# Patient Record
Sex: MALE | Race: ASIAN | NOT HISPANIC OR LATINO
[De-identification: names, ages, dates, MRNs, and addresses within clinical notes are randomized per-mention and may not be internally consistent; named-entity substitution may affect disease eponyms.]

---

## 2022-11-10 PROBLEM — Z00.00 ENCOUNTER FOR PREVENTIVE HEALTH EXAMINATION: Status: ACTIVE | Noted: 2022-11-10

## 2022-11-21 ENCOUNTER — NON-APPOINTMENT (OUTPATIENT)
Age: 52
End: 2022-11-21

## 2022-11-21 ENCOUNTER — APPOINTMENT (OUTPATIENT)
Dept: CARDIOTHORACIC SURGERY | Facility: CLINIC | Age: 52
End: 2022-11-21

## 2022-11-21 VITALS
DIASTOLIC BLOOD PRESSURE: 72 MMHG | HEART RATE: 59 BPM | RESPIRATION RATE: 18 BRPM | BODY MASS INDEX: 23.57 KG/M2 | HEIGHT: 72 IN | WEIGHT: 174 LBS | OXYGEN SATURATION: 96 % | TEMPERATURE: 97.6 F | SYSTOLIC BLOOD PRESSURE: 121 MMHG

## 2022-11-21 DIAGNOSIS — Z78.9 OTHER SPECIFIED HEALTH STATUS: ICD-10-CM

## 2022-11-23 ENCOUNTER — APPOINTMENT (OUTPATIENT)
Dept: CARDIOTHORACIC SURGERY | Facility: CLINIC | Age: 52
End: 2022-11-23

## 2022-11-28 NOTE — ASSESSMENT
[FreeTextEntry1] : 52 year old male, current every day marijuana use with a past medical hx of HTN, type A aortic dissection s/p Dacron grafts, AV resuspension in 2013,CAD s/p CABG x 1 (SVG to RCA 5/2013 with Dr. Medina,  seizure disorder (last episode on 7/4/22) presents for evaluation and management of his aortic pathology. \par Patient is referred by Dr. Jacqueline Gonsales. \par \par I have reviewed the patient's medical records, diagnostic images during the time of this office consultation and have made the following recommendation. I have reviewed the indications for surgery, and used our webpage [email or web address] <[email or web address]> to illustrate the aorta and anatomy of the heart. I have discussed the indications for surgery with the patient. Those indications are the following: size greater than 5.0 cm, symptomatic aneurysms, family history of aortic dissection or aneurysm death with a size greater than 4.5 cm, other necessary cardiac procedures such as coronary artery bypass grafting or valvular disorders with an aneurysm greater than 4.5 cm, or connective tissue disorders with an aneurysm size greater than 4.5 cm. The patient meets the indications.\par \par I had a lengthy discussion with the patient regarding his aneurysmal and valvular disease and progression. I have recommended that the patient is a candidate for a REOP ARCH REPLACEMENT. \par \par I also discussed undergoing a CTA chest, abdomen and pelvis to determine if he would be a candidate for the TRIOMPHE aortic arch stent graft. If eligible, patient will require a carotid to carotid to subclavian bypass, TEVAR. \par \par All questions were addressed and patient expressed interest in signing the consent form to reformat the CT images. \par \par Plan: \par 1.Obtain GATED CTA, Chest, abdomen and pelvis \par 2.Reformat images; discuss with sponsor to determine if the patient is a candidate for the TRIOMPHE aortic stent graft \par 3. Will contact patient in 1-2 weeks to discuss surgical planning \par 4. BP management \par 5. Follow up with cardiologist. \par

## 2022-11-28 NOTE — PHYSICAL EXAM
[General Appearance - Alert] : alert [General Appearance - In No Acute Distress] : in no acute distress [General Appearance - Well Nourished] : well nourished [General Appearance - Well Developed] : well developed [Sclera] : the sclera and conjunctiva were normal [PERRL With Normal Accommodation] : pupils were equal in size, round, and reactive to light [Extraocular Movements] : extraocular movements were intact [Outer Ear] : the ears and nose were normal in appearance [Hearing Threshold Finger Rub Not [place]] : hearing was normal [Both Tympanic Membranes Were Examined] : both tympanic membranes were normal [Neck Appearance] : the appearance of the neck was normal [Neck Cervical Mass (___cm)] : no neck mass was observed [Respiration, Rhythm And Depth] : normal respiratory rhythm and effort [Exaggerated Use Of Accessory Muscles For Inspiration] : no accessory muscle use [Auscultation Breath Sounds / Voice Sounds] : lungs were clear to auscultation bilaterally [Apical Impulse] : the apical impulse was normal [Heart Rate And Rhythm] : heart rate was normal and rhythm regular [Heart Sounds] : normal S1 and S2 [Examination Of The Chest] : the chest was normal in appearance [2+] : left 2+ [No Abnormalities] : the abdominal aorta was not enlarged and no bruit was heard [Bowel Sounds] : normal bowel sounds [Abdomen Soft] : soft [Abdomen Tenderness] : non-tender [No CVA Tenderness] : no ~M costovertebral angle tenderness [Abnormal Walk] : normal gait [Nail Clubbing] : no clubbing  or cyanosis of the fingernails [Musculoskeletal - Swelling] : no joint swelling seen [Skin Color & Pigmentation] : normal skin color and pigmentation [Skin Turgor] : normal skin turgor [] : no rash [Cranial Nerves] : cranial nerves 2-12 were intact [Deep Tendon Reflexes (DTR)] : deep tendon reflexes were 2+ and symmetric [Sensation] : the sensory exam was normal to light touch and pinprick [Motor Exam] : the motor exam was normal [Oriented To Time, Place, And Person] : oriented to person, place, and time [Impaired Insight] : insight and judgment were intact [Affect] : the affect was normal [Mood] : the mood was normal [FreeTextEntry1] : healed MSI  [Varicose Veins Of The Right Leg] : the patient has no varicose veins of the right leg [Varicose Veins Of The Left Leg] : the patient has no varicose veins of the left leg

## 2022-11-28 NOTE — PROCEDURE
[FreeTextEntry1] : Dr. Pierre reviewed the indications for surgery, and used our webpage [email or web address] <[email or web address]> to illustrate the aorta and anatomy of the heart. Those indications are the following: size greater than 5.0 cm, symptomatic aneurysms, family history of aortic dissection or aneurysm death with a size greater than 4.5 cm, other necessary cardiac procedures such as coronary artery bypass grafting or valvular disorders with an aneurysm greater than 4.5 cm, or connective tissue disorders with an aneurysm size greater than 4.5 cm. \par Dr. Pierre discussed activity restrictions with the patient, and would advise exercise at a moderate amount with no heavy lifting over one third of body weight, and avoiding heart rates that exceed 140 beats per minute. In addition, every patient should abstain from tobacco abuse and to avoid all illicit drug use, especially stimulants such as cocaine or methamphetamine. Dr. Pierre also counseled regarding maintaining a healthy heart diet, and losing any excessive weight as this also put undue stress on both the aorta and entire cardiovascular system. First degree family members should be screened for bicuspid valve disease, and ascending aortic aneurysms. \par Patient was advised to view the educational video prior to this visit regarding aortic pathology, risk factors, surgical procedures, and lifestyle modifications. Video can be retrieved at <[email or web address]=[email or web address]>.\par

## 2022-11-28 NOTE — DATA REVIEWED
[FreeTextEntry1] : MRI brain 3/22/2022: No acute intracranial abnormality. Focal susceptibility artifact seen in the left superior frontal gyrus and left postcentral gyrus may represent focal  dystrophic calcification, hemosiderin deposition from prior microhemorrhage or cavernomas. \par \par TTE 8/19/2022\par EF normal. trileaflet aortic valve. no aortic stenosis. mild to moderate aortic regurgitation. descending aorta measures 5.07cm. \par \par MRA 9/19/22\par ascending aorta 3cm. aortic arch 5.1cm. descending aorta 5.7cm. aortic dissection extends from upper aspect of the ascending aorta/proximal aortic arch to the aortic bifurcation. intimal flaps are present in the brachiocephalic artery, right subclavian artery, right common carotid artery. \par

## 2022-11-28 NOTE — END OF VISIT
[Time Spent: ___ minutes] : I have spent [unfilled] minutes of time on the encounter. [FreeTextEntry3] : I, KAYCE PARIKH , am scribing for and in the presence of YOSHI HOLT the following sections: History of present illness, past Medical/family/surgical/family/social history, review of systems, vital signs, physical exam and disposition.\par  \par I personally performed the services described in the documentation, reviewed the documentation recorded by the scribe in my presence and it accurately and completely records my words and actions.\par

## 2022-11-28 NOTE — HISTORY OF PRESENT ILLNESS
[FreeTextEntry1] : 52 year old male, current every day marijuana use with a past medical hx of HTN, type A aortic dissection s/p Dacron grafts, AV resuspension in 2013,CAD s/p CABG x 1 SVG to RCA (5/2013 with Dr. Medina), seizure disorder (last episode on 7/4/22) presents for evaluation and management of his aortic pathology. \par Patient is referred by Dr. Jacqueline Gonsales. \par \par He reports intermittent lower back pain relieved with oxycodone. He denies fever, chills, fatigue, headache, blurred vision, dizziness, syncope, chest pain, palpitations, shortness of breath, orthopnea, paroxysmal nocturnal dyspnea, nausea, vomiting, abdominal pain, BRBPR or swelling to legs. He is active and exercises several times a week. \par  \par \par

## 2022-11-28 NOTE — CONSULT LETTER
[Dear  ___] : Dear  [unfilled], [Please see my note below.] : Please see my note below. [Sincerely,] : Sincerely, [FreeTextEntry2] : Jacqueline Gonsales MD\par [facility]\par [address]\[place] [address]\par tel. [phone]; [phone]\par fax. [phone]; [phone] [FreeTextEntry1] : Please find attached our consultation on your patient, VINCENT MARIE \par We take a multidisciplinary team approach to patient care and consider you, the referring physician, an extension of our team. We will maintain an open line of communication with you throughout your patient's treatment course. \par It is our commitment to provide your patient with the highest quality of advanced therapeutic options. We thank you for allowing us to participate in the care of your patient.\par Please do not hesitate to contact our team with any questions or concerns at [phone].\par   [FreeTextEntry3] : Elvis Pierre M.D.\par Professor of Cardiovascular and Thoracic Surgery\par Minimally Invasive Valve Surgeon\par Director of Aortic Surgery, [facility]\par Cell: [phone]\par Email: [email or web address] \par \par [facility]:\par [address], [address] Floor, [place] [address]\par Office: [phone]\par Fax: [phone]\par \par [facility]:\par Department of Cardiovascular and Thoracic Surgery\par [address] [place] [address], [address]\par Office: [phone]\par Fax: [phone]\par \par \par Practice Manager: Ms. Sloane Fischer\par Email: [email or web address] \par Phone: [phone]

## 2022-11-30 ENCOUNTER — APPOINTMENT (OUTPATIENT)
Dept: CT IMAGING | Facility: CLINIC | Age: 52
End: 2022-11-30
Payer: SELF-PAY

## 2022-11-30 ENCOUNTER — RESULT REVIEW (OUTPATIENT)
Age: 52
End: 2022-11-30

## 2022-11-30 ENCOUNTER — OUTPATIENT (OUTPATIENT)
Dept: OUTPATIENT SERVICES | Facility: HOSPITAL | Age: 52
LOS: 1 days | End: 2022-11-30

## 2022-11-30 PROCEDURE — 74174 CTA ABD&PLVS W/CONTRAST: CPT | Mod: 26

## 2022-11-30 PROCEDURE — 71275 CT ANGIOGRAPHY CHEST: CPT | Mod: 26

## 2023-01-03 ENCOUNTER — NON-APPOINTMENT (OUTPATIENT)
Age: 53
End: 2023-01-03

## 2023-01-04 ENCOUNTER — APPOINTMENT (OUTPATIENT)
Dept: CARDIOTHORACIC SURGERY | Facility: CLINIC | Age: 53
End: 2023-01-04

## 2023-02-01 ENCOUNTER — NON-APPOINTMENT (OUTPATIENT)
Age: 53
End: 2023-02-01

## 2023-02-14 ENCOUNTER — NON-APPOINTMENT (OUTPATIENT)
Age: 53
End: 2023-02-14

## 2023-02-15 ENCOUNTER — NON-APPOINTMENT (OUTPATIENT)
Age: 53
End: 2023-02-15

## 2023-02-15 ENCOUNTER — APPOINTMENT (OUTPATIENT)
Dept: CARDIOTHORACIC SURGERY | Facility: CLINIC | Age: 53
End: 2023-02-15

## 2023-03-01 ENCOUNTER — APPOINTMENT (OUTPATIENT)
Dept: CARDIOTHORACIC SURGERY | Facility: CLINIC | Age: 53
End: 2023-03-01

## 2023-03-01 VITALS
DIASTOLIC BLOOD PRESSURE: 72 MMHG | TEMPERATURE: 98 F | SYSTOLIC BLOOD PRESSURE: 120 MMHG | OXYGEN SATURATION: 98 % | HEART RATE: 50 BPM | WEIGHT: 173 LBS | BODY MASS INDEX: 23.43 KG/M2 | RESPIRATION RATE: 18 BRPM | HEIGHT: 72 IN

## 2023-03-01 DIAGNOSIS — Z01.818 ENCOUNTER FOR OTHER PREPROCEDURAL EXAMINATION: ICD-10-CM

## 2023-03-01 RX ADMIN — FENTANYL CITRATE 25 MICROGRAM(S): 50 INJECTION INTRAVENOUS at 22:50

## 2023-03-06 RX ORDER — LEVETIRACETAM 500 MG/1
500 TABLET, FILM COATED ORAL
Qty: 60 | Refills: 0 | Status: DISCONTINUED | COMMUNITY
Start: 2022-09-28 | End: 2023-03-06

## 2023-03-06 RX ORDER — DIVALPROEX SODIUM 250 MG/1
250 TABLET, DELAYED RELEASE ORAL
Refills: 0 | Status: DISCONTINUED | COMMUNITY
Start: 2023-03-06 | End: 2023-03-06

## 2023-03-06 NOTE — ASSESSMENT
[FreeTextEntry1] : 52 year old male, current every day marijuana use with a past medical hx of HTN, type A aortic dissection s/p Dacron grafts, AV resuspension in 2013,CAD s/p CABG x 1 SVG to RCA (5/2013 with Dr. Medina), seizure disorder (last episode on 7/4/22) presents for a follow up visit for evaluation and management of his aortic pathology. Patient is referred by Dr. Jacqueline Gonsales. \par \par I have reviewed the patient's medical records, diagnostic images during the time of this office consultation and have made the following recommendation. I have reviewed the indications for surgery, and used our webpage [email or web address] <[email or web address]> to illustrate the aorta and anatomy of the heart. I have discussed the indications for surgery with the patient. Those indications are the following: size greater than 5.0 cm, symptomatic aneurysms, family history of aortic dissection or aneurysm death with a size greater than 4.5 cm, other necessary cardiac procedures such as coronary artery bypass grafting or valvular disorders with an aneurysm greater than 4.5 cm, or connective tissue disorders with an aneurysm size greater than 4.5 cm. The patient meets the indications.\par \par I had a lengthy discussion with the patient regarding his aneurysmal disease and progression. I have recommended that the patient is a candidate for a reop sternotomy, total arch replacement.  I have discussed the risks, benefits and alternatives to surgery. I have explained the risks of the surgery, including approximately 10 % major mortality or morbidity including stroke, infection, bleeding, death, renal failure and heart attack. There is a 5% risk of vocal cord injury. All questions were addressed and patient agrees to proceed with surgery.\par \par - the patient is a candidate for a reop total arch replacement with Thoraflex. DOS pending.\par - pt recommended for genetic testing. pt would like to defer at this time until after surgery.\par - BP management.\par - left heart cath? CTA coronaries? will review with Dr. Pierre.\par - covid test, carotid doppler, chest xray, preop labs, EKG.\par - continue cardiology care with Dr. Gonsales. \par

## 2023-03-06 NOTE — CONSULT LETTER
[Dear  ___] : Dear  [unfilled], [Please see my note below.] : Please see my note below. [FreeTextEntry2] : Jacqueline Gonsales MD [FreeTextEntry1] : Please find attached our consultation on your patient, Mr. VINCENT MARIE . \par \par We take a multidisciplinary team approach to patient care and consider you, the referring physician, an extension of our team. We will maintain an open line of communication with you throughout your patient's treatment course.  \par \par It is our commitment to provide your patient with the highest quality of advanced therapeutic options. We thank you for allowing us to participate in the care of your patient.\par \par Please do not hesitate to contact our team with any questions or concerns at [phone]. \par \par  [FreeTextEntry3] : Sincerely, \par \par \par \par \par \par Elvis Pierre M.D.\par Professor of Cardiovascular and Thoracic Surgery\par Minimally Invasive Valve Surgeon\par Director of Aortic Surgery, [facility]\par Cell: [phone]\par Email: [email or web address] \par \par [facility]:\par [address], [address], [place] [address]\par Office: [phone]\par Fax: [phone]\par \par [facility]:\par Department of Cardiovascular and Thoracic Surgery\par [address] [place] [address], [address]\par Office: [phone]\par Fax: [phone]\par \par Practice Manager: Ms. Sloane Fischer\par Email: [email or web address]\par Phone: [phone]\par \par \par \par

## 2023-03-06 NOTE — END OF VISIT
[Time Spent: ___ minutes] : I have spent [unfilled] minutes of time on the encounter. [FreeTextEntry3] : \par I, BEA GERMANU , am scribing for and in the presence of YOSHI HOLT the following sections: History of present illness, past Medical/family/surgical/family/social history, review of systems, vital signs, physical exam and disposition.\par I personally performed the services described in the documentation, reviewed the documentation recorded by the scribe in my presence and it accurately and completely records my words and actions.\par \par \par

## 2023-03-06 NOTE — HISTORY OF PRESENT ILLNESS
Patient [FreeTextEntry1] : 52 year old male, current every day marijuana use with a past medical hx of HTN, type A aortic dissection s/p Dacron grafts, AV resuspension in 2013,CAD s/p CABG x 1 SVG to RCA (5/2013 with Dr. Medina), seizure disorder (last episode on 7/4/22) presents for a follow up visit for evaluation and management of his aortic pathology. Patient is referred by Dr. Jacqueline Gonsales. \par \par Screen failed for Triomphe study \par \par CTA chest, abdomen and pelvis 11/30/22\par Status post graft repair of the ascending aorta and portion of aortic arch.\par Dissecting aneurysm of the descending thoracic aorta (measuring up to 5.7 cm) and abdominal aorta (3.5 cm infrarenal), similar to the MR angiogram of 9/19/2022.\par Nonocclusive dissection flap present within the innominate artery, aneurysmal right subclavian artery and proximal right common carotid artery as well as aneurysmal left common iliac artery.\par \par Patient denies fever, chills, fatigue, headache, blurred vision, dizziness, syncope, chest pain, palpitations, shortness of breath, orthopnea, paroxysmal nocturnal dyspnea, nausea, vomiting, abdominal pain, back pain, BRBPR or swelling to legs.\par

## 2023-03-06 NOTE — PHYSICAL EXAM
[Sclera] : the sclera and conjunctiva were normal [Neck Appearance] : the appearance of the neck was normal [] : no respiratory distress [Respiration, Rhythm And Depth] : normal respiratory rhythm and effort [Heart Rate And Rhythm] : heart rate was normal and rhythm regular [Heart Sounds] : normal S1 and S2 [Murmurs] : no murmurs [Examination Of The Chest] : the chest was normal in appearance [2+] : left 2+ [Bowel Sounds] : normal bowel sounds [Abdomen Soft] : soft [No CVA Tenderness] : no ~M costovertebral angle tenderness [Abnormal Walk] : normal gait [Skin Color & Pigmentation] : normal skin color and pigmentation [No Focal Deficits] : no focal deficits [Oriented To Time, Place, And Person] : oriented to person, place, and time [FreeTextEntry1] : deferred

## 2023-03-06 NOTE — PROCEDURE
[FreeTextEntry1] : Patient was advised to view the educational video prior to this visit regarding aortic pathology, risk factors, surgical procedures, and lifestyle modifications. Video can be retrieved at [email or web address]&feature=youtu.be.\par \par

## 2023-03-07 VITALS
SYSTOLIC BLOOD PRESSURE: 138 MMHG | WEIGHT: 169.98 LBS | OXYGEN SATURATION: 96 % | TEMPERATURE: 98 F | HEART RATE: 48 BPM | DIASTOLIC BLOOD PRESSURE: 63 MMHG | RESPIRATION RATE: 15 BRPM

## 2023-03-07 RX ORDER — CHLORHEXIDINE GLUCONATE 213 G/1000ML
1 SOLUTION TOPICAL ONCE
Refills: 0 | Status: DISCONTINUED | OUTPATIENT
Start: 2023-03-09 | End: 2023-03-23

## 2023-03-07 NOTE — H&P ADULT - NSHPLABSRESULTS_GEN_ALL_CORE
11.1   4.89  )-----------( 162      ( 09 Mar 2023 09:33 )             35.4       03-09    137  |  101  |  23  ----------------------------<  78  4.7   |  28  |  1.28    Ca    8.9      09 Mar 2023 10:04  Mg     2.4     03-09    TPro  6.7  /  Alb  3.6  /  TBili  0.2  /  DBili  x   /  AST  19  /  ALT  11  /  AlkPhos  57  03-09      PT/INR - ( 09 Mar 2023 09:33 )   PT: 11.6 sec;   INR: 0.98          PTT - ( 09 Mar 2023 09:33 )  PTT:36.7 sec    CARDIAC MARKERS ( 09 Mar 2023 10:04 )  x     / x     / 76 U/L / x     / <1.0 ng/mL

## 2023-03-07 NOTE — H&P ADULT - ASSESSMENT
52 year old male, current every day marijuana use, with HTN, type A aortic dissection s/p Dacron grafts, AV resuspension in 2013, known CAD s/p CABGx1 (SVG-RCA 05/2013 w/Dr. Medina), seizure disorder (last episode 07/04/22) who presented to his outpatient cardiologist for evaluation of aortic pathology. TTE 08/19/2022 revealing normal LVSF with EF 66%, no RWMA, AV is trileaflet, no AS, mild/mod AR. The aortic root is normal in size, descending aorta dilated at 5.07 cm. IVC is dilated w/ <50% collapse c/s elevated RAP. CT chest anigogram 11/30/2022: s/p graft repair of ascending aorta and portion of aortic arch. Dissecting aneurysm of descending thoracic aorta (5.7 cm) and abdominal aorta (3.5 cm infrarenal) and nonocclusive dissection flap present within the innominate artery, aneurysmal right subclavian artery and prox right common carotid artery as well as aneurysmal left AN. MRA Chest 09/19/2022: aneurysmal dilation of aortic arch and descending aorta, aortic dissection extending from the upper ascending aorta, proximal aortic arch to the aortic bifurcation. Given patients aneurysmal disease and progression he is now referred to [facility] for left coronary angiogram to further assess his coronary anatomy in preparation for reop sternotomy and total arch replacement.     EKG: sinus bailey 50bpm			  ASA I			Mallampati class: III    Anginal Class: I    -No Known Allergies    -H/H = 11.1/35.4  . Pt denies BRBPR, hematuria, hematochezia, melena. No load as patient is pre-procedure for sternotomy and arch replacement  -BUN/Cr = 23/1.28  . EF 66%. Euvolemic on exam. IV NS @ 250cc bolus followed by 75cc/2hrs started pre procedure    Sedation Plan:   Moderate  Patient Is Suitable Candidate For Sedation?     Yes    Risks & benefits of procedure and alternative therapy have been explained to the patient including but not limited to: allergic reaction, bleeding with possible need for blood transfusion, infection, renal and vascular compromise, limb damage, arrhythmia, stroke, vessel dissection/perforation, myocardial infarction, and emergent CABG. Informed consent obtained at bedside and included in chart.

## 2023-03-07 NOTE — H&P ADULT - NSICDXPASTMEDICALHX_GEN_ALL_CORE_FT
PAST MEDICAL HISTORY:  Aortic dissection     CAD (coronary artery disease)     HTN (hypertension)     Seizure disorder

## 2023-03-07 NOTE — H&P ADULT - HISTORY OF PRESENT ILLNESS
COVID:  Cardiologist: Dr. Randee García  Pharmacy:  Escort:      52 year old male, current every day marijuana use, with HTN, type A aortic dissection s/p Dacron grafts, AV resuspension in 2013, known CAD s/p CABGx1 (SVG-RCA 05/2013 w/Dr. Medina), seizure disorder (last episode 07/04/22) who presented to his outpatient cardiologist for evaluation of aortic pathology. Patient underwent TTE 08/19/2022 revealing normal LVSF with EF 66%, no RWMA, AV is trileaflet, no AS, mild/mod AR. The aortic root is normal in size, descending aorta dilated at 5.07 cm. IVC is dilated w/ <50% collapse c/s elevated RAP. CT chest anigogram 11/30/2022: s/p graft repair of ascending aorta and portion of aortic arch. Dissecting aneurysm of descending thoracic aorta (5.7 cm) and abdominal aorta (3.5 cm infrarenal) and nonocclusive dissection flap present within the innominate artery, aneurysmal right subclavian artery and prox right common carotid artery as well as aneurysmal left AN. MRA Chest 09/19/2022: aneurysmal dilation of aortic arch and descending aorta, aortic dissection extending from the upper ascending aorta, proximal aortic arch to the aortic bifurcation. Given patients aneurysmal disease and progression he is now referred to [facility] for left coronary angiogram to further assess his coronary anatomy in preparation for reop sternotomy and total arch replacement.  COVID: (-) 3/9/23 in [facility]  Cardiologist: Dr. Randee García  Pharmacy: [facility] [id number] [facility]  Escort: Wife    52 year old male, current every day marijuana use, with HTN, type A aortic dissection s/p Dacron grafts, AV resuspension in 2013, known CAD s/p CABGx1 (SVG-RCA 05/2013 w/Dr. Medina), seizure disorder (last episode 07/04/22) who presented to his outpatient cardiologist for evaluation of aortic pathology. Patient underwent TTE 08/19/2022 revealing normal LVSF with EF 66%, no RWMA, AV is trileaflet, no AS, mild/mod AR. The aortic root is normal in size, descending aorta dilated at 5.07 cm. IVC is dilated w/ <50% collapse c/s elevated RAP. CT chest anigogram 11/30/2022: s/p graft repair of ascending aorta and portion of aortic arch. Dissecting aneurysm of descending thoracic aorta (5.7 cm) and abdominal aorta (3.5 cm infrarenal) and nonocclusive dissection flap present within the innominate artery, aneurysmal right subclavian artery and prox right common carotid artery as well as aneurysmal left AN. MRA Chest 09/19/2022: aneurysmal dilation of aortic arch and descending aorta, aortic dissection extending from the upper ascending aorta, proximal aortic arch to the aortic bifurcation. Given patients aneurysmal disease and progression he is now referred to [facility] for left coronary angiogram to further assess his coronary anatomy in preparation for reop sternotomy and total arch replacement.

## 2023-03-08 PROBLEM — Z01.818 PREOP TESTING: Status: ACTIVE | Noted: 2023-03-08

## 2023-03-09 ENCOUNTER — RESULT REVIEW (OUTPATIENT)
Age: 53
End: 2023-03-09

## 2023-03-09 ENCOUNTER — OUTPATIENT (OUTPATIENT)
Dept: OUTPATIENT SERVICES | Facility: HOSPITAL | Age: 53
LOS: 1 days | Discharge: HOME | End: 2023-03-09
Payer: COMMERCIAL

## 2023-03-09 DIAGNOSIS — Z95.1 PRESENCE OF AORTOCORONARY BYPASS GRAFT: Chronic | ICD-10-CM

## 2023-03-09 DIAGNOSIS — Z95.828 PRESENCE OF OTHER VASCULAR IMPLANTS AND GRAFTS: Chronic | ICD-10-CM

## 2023-03-09 LAB
A1C WITH ESTIMATED AVERAGE GLUCOSE RESULT: 4.4 % — SIGNIFICANT CHANGE UP (ref 4–5.6)
ALBUMIN SERPL ELPH-MCNC: 3.6 G/DL — SIGNIFICANT CHANGE UP (ref 3.3–5)
ALP SERPL-CCNC: 57 U/L — SIGNIFICANT CHANGE UP (ref 40–120)
ALT FLD-CCNC: 11 U/L — SIGNIFICANT CHANGE UP (ref 10–45)
ANION GAP SERPL CALC-SCNC: 8 MMOL/L — SIGNIFICANT CHANGE UP (ref 5–17)
APPEARANCE UR: CLEAR — SIGNIFICANT CHANGE UP
APTT BLD: 36.7 SEC — HIGH (ref 27.5–35.5)
AST SERPL-CCNC: 19 U/L — SIGNIFICANT CHANGE UP (ref 10–40)
BASOPHILS # BLD AUTO: 0.03 K/UL — SIGNIFICANT CHANGE UP (ref 0–0.2)
BASOPHILS NFR BLD AUTO: 0.6 % — SIGNIFICANT CHANGE UP (ref 0–2)
BILIRUB SERPL-MCNC: 0.2 MG/DL — SIGNIFICANT CHANGE UP (ref 0.2–1.2)
BILIRUB UR-MCNC: NEGATIVE — SIGNIFICANT CHANGE UP
BLD GP AB SCN SERPL QL: NEGATIVE — SIGNIFICANT CHANGE UP
BUN SERPL-MCNC: 23 MG/DL — SIGNIFICANT CHANGE UP (ref 7–23)
CALCIUM SERPL-MCNC: 8.9 MG/DL — SIGNIFICANT CHANGE UP (ref 8.4–10.5)
CHLORIDE SERPL-SCNC: 101 MMOL/L — SIGNIFICANT CHANGE UP (ref 96–108)
CHOLEST SERPL-MCNC: 130 MG/DL — SIGNIFICANT CHANGE UP
CK MB CFR SERPL CALC: <1 NG/ML — SIGNIFICANT CHANGE UP (ref 0–6.7)
CK SERPL-CCNC: 76 U/L — SIGNIFICANT CHANGE UP (ref 30–200)
CO2 SERPL-SCNC: 28 MMOL/L — SIGNIFICANT CHANGE UP (ref 22–31)
COLOR SPEC: YELLOW — SIGNIFICANT CHANGE UP
CREAT SERPL-MCNC: 1.28 MG/DL — SIGNIFICANT CHANGE UP (ref 0.5–1.3)
DIFF PNL FLD: NEGATIVE — SIGNIFICANT CHANGE UP
EGFR: 67 ML/MIN/1.73M2 — SIGNIFICANT CHANGE UP
EOSINOPHIL # BLD AUTO: 0.15 K/UL — SIGNIFICANT CHANGE UP (ref 0–0.5)
EOSINOPHIL NFR BLD AUTO: 3.1 % — SIGNIFICANT CHANGE UP (ref 0–6)
ESTIMATED AVERAGE GLUCOSE: 80 MG/DL — SIGNIFICANT CHANGE UP (ref 68–114)
GLUCOSE SERPL-MCNC: 78 MG/DL — SIGNIFICANT CHANGE UP (ref 70–99)
GLUCOSE UR QL: NEGATIVE — SIGNIFICANT CHANGE UP
HBV SURFACE AG SER-ACNC: SIGNIFICANT CHANGE UP
HCT VFR BLD CALC: 35.4 % — LOW (ref 39–50)
HDLC SERPL-MCNC: 43 MG/DL — SIGNIFICANT CHANGE UP
HGB BLD-MCNC: 11.1 G/DL — LOW (ref 13–17)
IMM GRANULOCYTES NFR BLD AUTO: 0.2 % — SIGNIFICANT CHANGE UP (ref 0–0.9)
INR BLD: 0.98 — SIGNIFICANT CHANGE UP (ref 0.88–1.16)
ISTAT INR: 1.1 — SIGNIFICANT CHANGE UP (ref 0.88–1.16)
ISTAT PT: 13.5 SEC — HIGH (ref 10–12.9)
KETONES UR-MCNC: NEGATIVE — SIGNIFICANT CHANGE UP
LEUKOCYTE ESTERASE UR-ACNC: NEGATIVE — SIGNIFICANT CHANGE UP
LIPID PNL WITH DIRECT LDL SERPL: 70 MG/DL — SIGNIFICANT CHANGE UP
LYMPHOCYTES # BLD AUTO: 2.56 K/UL — SIGNIFICANT CHANGE UP (ref 1–3.3)
LYMPHOCYTES # BLD AUTO: 52.4 % — HIGH (ref 13–44)
MAGNESIUM SERPL-MCNC: 2.4 MG/DL — SIGNIFICANT CHANGE UP (ref 1.6–2.6)
MCHC RBC-ENTMCNC: 31.4 GM/DL — LOW (ref 32–36)
MCHC RBC-ENTMCNC: 31.5 PG — SIGNIFICANT CHANGE UP (ref 27–34)
MCV RBC AUTO: 100.6 FL — HIGH (ref 80–100)
MONOCYTES # BLD AUTO: 0.63 K/UL — SIGNIFICANT CHANGE UP (ref 0–0.9)
MONOCYTES NFR BLD AUTO: 12.9 % — SIGNIFICANT CHANGE UP (ref 2–14)
NEUTROPHILS # BLD AUTO: 1.51 K/UL — LOW (ref 1.8–7.4)
NEUTROPHILS NFR BLD AUTO: 30.8 % — LOW (ref 43–77)
NITRITE UR-MCNC: NEGATIVE — SIGNIFICANT CHANGE UP
NON HDL CHOLESTEROL: 87 MG/DL — SIGNIFICANT CHANGE UP
NRBC # BLD: 0 /100 WBCS — SIGNIFICANT CHANGE UP (ref 0–0)
PH UR: 8 — SIGNIFICANT CHANGE UP (ref 5–8)
PLATELET # BLD AUTO: 162 K/UL — SIGNIFICANT CHANGE UP (ref 150–400)
POCT ISTAT CREATININE: 1.3 MG/DL — SIGNIFICANT CHANGE UP (ref 0.5–1.3)
POTASSIUM SERPL-MCNC: 4.7 MMOL/L — SIGNIFICANT CHANGE UP (ref 3.5–5.3)
POTASSIUM SERPL-SCNC: 4.7 MMOL/L — SIGNIFICANT CHANGE UP (ref 3.5–5.3)
PROT SERPL-MCNC: 6.7 G/DL — SIGNIFICANT CHANGE UP (ref 6–8.3)
PROT UR-MCNC: NEGATIVE MG/DL — SIGNIFICANT CHANGE UP
PROTHROM AB SERPL-ACNC: 11.6 SEC — SIGNIFICANT CHANGE UP (ref 10.5–13.4)
RBC # BLD: 3.52 M/UL — LOW (ref 4.2–5.8)
RBC # FLD: 13.1 % — SIGNIFICANT CHANGE UP (ref 10.3–14.5)
RH IG SCN BLD-IMP: POSITIVE — SIGNIFICANT CHANGE UP
SODIUM SERPL-SCNC: 137 MMOL/L — SIGNIFICANT CHANGE UP (ref 135–145)
SP GR SPEC: 1.01 — SIGNIFICANT CHANGE UP (ref 1–1.03)
TRIGL SERPL-MCNC: 85 MG/DL — SIGNIFICANT CHANGE UP
TSH SERPL-MCNC: 0.63 UIU/ML — SIGNIFICANT CHANGE UP (ref 0.27–4.2)
UROBILINOGEN FLD QL: 0.2 E.U./DL — SIGNIFICANT CHANGE UP
WBC # BLD: 4.89 K/UL — SIGNIFICANT CHANGE UP (ref 3.8–10.5)
WBC # FLD AUTO: 4.89 K/UL — SIGNIFICANT CHANGE UP (ref 3.8–10.5)

## 2023-03-09 PROCEDURE — 93880 EXTRACRANIAL BILAT STUDY: CPT | Mod: 26

## 2023-03-09 PROCEDURE — 93010 ELECTROCARDIOGRAM REPORT: CPT

## 2023-03-09 PROCEDURE — 83036 HEMOGLOBIN GLYCOSYLATED A1C: CPT

## 2023-03-09 PROCEDURE — 82553 CREATINE MB FRACTION: CPT

## 2023-03-09 PROCEDURE — 93005 ELECTROCARDIOGRAM TRACING: CPT

## 2023-03-09 PROCEDURE — 85025 COMPLETE CBC W/AUTO DIFF WBC: CPT

## 2023-03-09 PROCEDURE — 81003 URINALYSIS AUTO W/O SCOPE: CPT

## 2023-03-09 PROCEDURE — 93455 CORONARY ART/GRFT ANGIO S&I: CPT

## 2023-03-09 PROCEDURE — 83735 ASSAY OF MAGNESIUM: CPT

## 2023-03-09 PROCEDURE — 80061 LIPID PANEL: CPT

## 2023-03-09 PROCEDURE — 99153 MOD SED SAME PHYS/QHP EA: CPT

## 2023-03-09 PROCEDURE — 94150 VITAL CAPACITY TEST: CPT

## 2023-03-09 PROCEDURE — 99152 MOD SED SAME PHYS/QHP 5/>YRS: CPT

## 2023-03-09 PROCEDURE — 36415 COLL VENOUS BLD VENIPUNCTURE: CPT

## 2023-03-09 PROCEDURE — C1887: CPT

## 2023-03-09 PROCEDURE — C1894: CPT

## 2023-03-09 PROCEDURE — 86901 BLOOD TYPING SEROLOGIC RH(D): CPT

## 2023-03-09 PROCEDURE — C1769: CPT

## 2023-03-09 PROCEDURE — 86850 RBC ANTIBODY SCREEN: CPT

## 2023-03-09 PROCEDURE — 87340 HEPATITIS B SURFACE AG IA: CPT

## 2023-03-09 PROCEDURE — 84443 ASSAY THYROID STIM HORMONE: CPT

## 2023-03-09 PROCEDURE — 82565 ASSAY OF CREATININE: CPT

## 2023-03-09 PROCEDURE — 94010 BREATHING CAPACITY TEST: CPT | Mod: 26

## 2023-03-09 PROCEDURE — 71045 X-RAY EXAM CHEST 1 VIEW: CPT

## 2023-03-09 PROCEDURE — 86900 BLOOD TYPING SEROLOGIC ABO: CPT

## 2023-03-09 PROCEDURE — 85730 THROMBOPLASTIN TIME PARTIAL: CPT

## 2023-03-09 PROCEDURE — 82550 ASSAY OF CK (CPK): CPT

## 2023-03-09 PROCEDURE — 80053 COMPREHEN METABOLIC PANEL: CPT

## 2023-03-09 PROCEDURE — 93880 EXTRACRANIAL BILAT STUDY: CPT

## 2023-03-09 PROCEDURE — 93455 CORONARY ART/GRFT ANGIO S&I: CPT | Mod: 26

## 2023-03-09 PROCEDURE — 85610 PROTHROMBIN TIME: CPT

## 2023-03-09 PROCEDURE — 71045 X-RAY EXAM CHEST 1 VIEW: CPT | Mod: 26

## 2023-03-09 RX ORDER — SIMETHICONE 80 MG/1
80 TABLET, CHEWABLE ORAL ONCE
Refills: 0 | Status: COMPLETED | OUTPATIENT
Start: 2023-03-09 | End: 2023-03-09

## 2023-03-09 RX ORDER — SODIUM CHLORIDE 9 MG/ML
500 INJECTION INTRAMUSCULAR; INTRAVENOUS; SUBCUTANEOUS
Refills: 0 | Status: DISCONTINUED | OUTPATIENT
Start: 2023-03-09 | End: 2023-03-23

## 2023-03-09 RX ORDER — SODIUM CHLORIDE 9 MG/ML
500 INJECTION INTRAMUSCULAR; INTRAVENOUS; SUBCUTANEOUS
Refills: 0 | Status: DISCONTINUED | OUTPATIENT
Start: 2023-03-09 | End: 2023-03-09

## 2023-03-09 RX ORDER — SODIUM CHLORIDE 9 MG/ML
250 INJECTION INTRAMUSCULAR; INTRAVENOUS; SUBCUTANEOUS ONCE
Refills: 0 | Status: COMPLETED | OUTPATIENT
Start: 2023-03-09 | End: 2023-03-09

## 2023-03-09 RX ADMIN — SODIUM CHLORIDE 75 MILLILITER(S): 9 INJECTION INTRAMUSCULAR; INTRAVENOUS; SUBCUTANEOUS at 11:04

## 2023-03-09 RX ADMIN — SIMETHICONE 80 MILLIGRAM(S): 80 TABLET, CHEWABLE ORAL at 12:45

## 2023-03-09 RX ADMIN — SODIUM CHLORIDE 1000 MILLILITER(S): 9 INJECTION INTRAMUSCULAR; INTRAVENOUS; SUBCUTANEOUS at 11:05

## 2023-03-09 RX ADMIN — SODIUM CHLORIDE 100 MILLILITER(S): 9 INJECTION INTRAMUSCULAR; INTRAVENOUS; SUBCUTANEOUS at 12:30

## 2023-03-09 NOTE — PROGRESS NOTE ADULT - ASSESSMENT
Interventional Cardiology PA SDA Discharge Note    Patient without complaints. Ambulated and voided without difficulties  Afebrile, VSS  Ext: Left Radial :  no  hematoma,   no  bleeding, dressing; C/D/I  Pulses:    intact RAD to baseline     A/P:    s/p diagnostic cardiac cath (3/9/23): SVG-RCA patent, remaining vessels luminal irregularities. ACCESS: L radial w/ TR band for hemostasis.     OF NOTE: pt to complete pre-op studies prior to discharge (CXR, TTE, B/L Carotid Duplex). Pt planned to return for surgery on 3/19/23.     1.	Stable for discharge as per attending Dr. Doan after bed rest, pt voids, groin/wrist stable and 30 minutes of ambulation.  2.	Follow-up with PMD/Cardiologist Dr. Pierre in 1-2 weeks  3.	Discharged forms signed and copies in chart

## 2023-03-13 DIAGNOSIS — I25.118 ATHEROSCLEROTIC HEART DISEASE OF NATIVE CORONARY ARTERY WITH OTHER FORMS OF ANGINA PECTORIS: ICD-10-CM

## 2023-03-13 DIAGNOSIS — Z95.1 PRESENCE OF AORTOCORONARY BYPASS GRAFT: ICD-10-CM

## 2023-03-13 DIAGNOSIS — Z01.810 ENCOUNTER FOR PREPROCEDURAL CARDIOVASCULAR EXAMINATION: ICD-10-CM

## 2023-03-15 ENCOUNTER — FORM ENCOUNTER (OUTPATIENT)
Age: 53
End: 2023-03-15

## 2023-03-16 ENCOUNTER — OUTPATIENT (OUTPATIENT)
Dept: OUTPATIENT SERVICES | Facility: HOSPITAL | Age: 53
LOS: 1 days | End: 2023-03-16
Payer: COMMERCIAL

## 2023-03-16 ENCOUNTER — LABORATORY RESULT (OUTPATIENT)
Age: 53
End: 2023-03-16

## 2023-03-16 DIAGNOSIS — Z95.828 PRESENCE OF OTHER VASCULAR IMPLANTS AND GRAFTS: Chronic | ICD-10-CM

## 2023-03-16 DIAGNOSIS — Z95.1 PRESENCE OF AORTOCORONARY BYPASS GRAFT: Chronic | ICD-10-CM

## 2023-03-16 DIAGNOSIS — I71.20 THORACIC AORTIC ANEURYSM, WITHOUT RUPTURE, UNSPECIFIED: ICD-10-CM

## 2023-03-16 PROCEDURE — 93306 TTE W/DOPPLER COMPLETE: CPT | Mod: 26

## 2023-03-16 PROCEDURE — 93306 TTE W/DOPPLER COMPLETE: CPT

## 2023-03-16 NOTE — H&P ADULT - ASSESSMENT
Aortic Dissection Repair  Descending thoracic aortic aneurysm (441.2) (I71.2)    52 year old male, current every day marijuana use with a past medical hx of HTN, type A aortic dissection s/p Dacron grafts, AV resuspension in 2013,CAD s/p CABG x 1 SVG to RCA (5/2013 with Dr. Medina), seizure disorder (last episode on 7/4/22) presents for a follow up visit for evaluation and management of his aortic pathology. Patient is referred by Dr. Jacqueline Gonsales.     I have reviewed the patient's medical records, diagnostic images during the time of this office consultation and have made the following recommendation. I have reviewed the indications for surgery, and used our webpage [email or web address] <[email or web address]> to illustrate the aorta and anatomy of the heart. I have discussed the indications for surgery with the patient. Those indications are the following: size greater than 5.0 cm, symptomatic aneurysms, family history of aortic dissection or aneurysm death with a size greater than 4.5 cm, other necessary cardiac procedures such as coronary artery bypass grafting or valvular disorders with an aneurysm greater than 4.5 cm, or connective tissue disorders with an aneurysm size greater than 4.5 cm. The patient meets the indications.    I had a lengthy discussion with the patient regarding his aneurysmal disease and progression. I have recommended that the patient is a candidate for a reop sternotomy, total arch replacement. I have discussed the risks, benefits and alternatives to surgery. I have explained the risks of the surgery, including approximately 10 % major mortality or morbidity including stroke, infection, bleeding, death, renal failure and heart attack. There is a 5% risk of vocal cord injury. All questions were addressed, and patient agrees to proceed with surgery.    - the patient is a candidate for a reop total arch replacement with Thoraflex. DOS 3/20/23.   -NPO past midnight.  -take Metoprolol, Depakote, and Lacosamide AM of surgery.   -preop instructions and antibacterial soaps x 3 provided.

## 2023-03-16 NOTE — H&P ADULT - NSHPLABSRESULTS_GEN_ALL_CORE
Labs, Radiology, Cardiology, and Other Results:               11.1   4.89  )-----------( 162      ( 09 Mar 2023 09:33 )             35.4       03-09    137  |  101  |  23  ----------------------------<  78  4.7   |  28  |  1.28    Ca    8.9      09 Mar 2023 10:04  Mg     2.4     03-09    TPro  6.7  /  Alb  3.6  /  TBili  0.2  /  DBili  x   /  AST  19  /  ALT  11  /  AlkPhos  57  03-09      PT/INR - ( 09 Mar 2023 09:33 )   PT: 11.6 sec;   INR: 0.98          PTT - ( 09 Mar 2023 09:33 )  PTT:36.7 sec    CARDIAC MARKERS ( 09 Mar 2023 10:04 )  x     / x     / 76 U/L / x     / <1.0 ng/mL    < from: US Duplex Carotid Arteries Complete, Bilateral (03.09.23 @ 16:47) >     1. As on the CTA from 11/30/2022, there are dissection flaps   present in the proximal portion of the right common carotid artery and in   the right subclavian artery.    2. Mildly elevated peak systolic velocity proximal right common carotid   artery may represent mild stenosis due to the dissection.    3. No evidence of hemodynamically significant stenosis in either internal   carotid artery.      < end of copied text >    < from: Xray Chest 1 View- PORTABLE-Routine (Xray Chest 1 View- PORTABLE-Routine .) (03.09.23 @ 10:14) >    Clear lungs. Tortuous thoracic aorta with dilated aortic arch.    < end of copied text >    < from: CT Angio Chest Aorta w/wo IV Cont (11.30.22 @ 15:40) >    Status post graft repair of the ascending aorta and portion of aortic   arch.    Dissecting aneurysm of the descending thoracic aorta (measuring up to 5.7  cm) and abdominal aorta (3.5 cm infrarenal), similar to the MR angiogram   of 9/19/2022.    Nonocclusive dissection flap present within the innominate artery,   aneurysmal right subclavian artery and proximal right common carotid   artery as well as aneurysmal left common iliac artery.    < end of copied text >    < from: 12 Lead ECG (03.09.23 @ 10:26) >    Diagnosis Line Sinus bradycardia  Otherwise normal ECG    < end of copied text >

## 2023-03-16 NOTE — H&P ADULT - NSHPPHYSICALEXAM_GEN_ALL_CORE
Physical Exam:    Height/Weight/BSA/BMI:  · Height (FEET)	6 Feet  · 	 used   · Dosing Weight (KILOGRAMS)	77.1 kg  · Dosing Weight  (POUNDS)	169.9 Pound(s)     T/HR/RR/BP:  · Temp (F)	98 Degrees F  · Temp (C)	36.7 Degrees C  · Heart Rate	 48 /min  · Respiration Rate (breaths/min)	15 /min  · BP Systolic	138 mm Hg  · BP Diastolic	63 mm Hg  · BP Noninvasive Mean	88 mm Hg  · SpO2 (%)	96 %  · O2 Delivery/Oxygen Delivery Method	room air     Physical Exam:  · Constitutional	well-groomed; no distress  · Eyes	PERRL; EOMI; conjunctiva clear  · Respiratory	clear to auscultation bilaterally; no wheezes; no rales; no rhonchi  · Cardiovascular	regular rate and rhythm; S1 S2 present; no gallops; no rub; murmur; vascular  · Murmur Timing	diastolic  · Character of Diastolic Murmur	murmur loudness: IV/VI  · Radial Pulse	2+ b/l  · Femoral Pulse	2+ b/l no bruit  · DP Pulse	2+ b/l  · PT Pulse	2+ b/l  · Gastrointestinal	soft; nontender; nondistended; normal active bowel sounds  · Neurological	cranial nerves II-XII intact; sensation intact; responds to verbal commands  · Skin	warm and dry; color normal; no rashes; no ulcers  · Musculoskeletal	normal; normal gait; ROM intact; strength 5/5 bilateral upper extremities; strength 5/5 bilateral lower extremities  · Psychiatric	normal affect; alert and oriented x3; normal behavior

## 2023-03-16 NOTE — H&P ADULT - HISTORY OF PRESENT ILLNESS
52 year old male, current every day marijuana use with a past medical hx of HTN, type A aortic dissection s/p Dacron grafts, AV resuspension in 2013,CAD s/p CABG x 1 SVG to RCA (5/2013 with Dr. Medina), seizure disorder (last episode on 7/4/22) presents for a follow up visit for evaluation and management of his aortic pathology. Patient is referred by Dr. Jacqueline Gonsales. Screen failed for Triomphe study     CTA chest, abdomen and pelvis 11/30/22  Status post graft repair of the ascending aorta and portion of aortic arch.  Dissecting aneurysm of the descending thoracic aorta (measuring up to 5.7 cm) and abdominal aorta (3.5 cm infrarenal), similar to the MR angiogram of 9/19/2022.  Nonocclusive dissection flap present within the innominate artery, aneurysmal right subclavian artery and proximal right common carotid artery as well as aneurysmal left common iliac artery.    Patient denies fever, chills, fatigue, headache, blurred vision, dizziness, syncope, palpitations, shortness of breath, orthopnea, paroxysmal nocturnal dyspnea, nausea, vomiting, abdominal pain, back pain, BRBPR or swelling to legs.    Patient s/p diagnostic cardiac cath (3/9/23): SVG-RCA patent, remaining vessels luminal irregularities. ACCESS: L radial w/ TR band for hemostasis.     The patient was evaluated by CT surgery and is deemed a candidate for a reop total arch replacement with Thoraflex.   52 year old male, current every day marijuana use with a past medical hx of HTN, type A aortic dissection s/p Dacron grafts, AV resuspension in 2013,CAD s/p CABG x 1 SVG to RCA (5/2013 with Dr. Medina), seizure disorder (last episode on 7/4/22) presents for a follow up visit for evaluation and management of his aortic pathology. Patient is referred by Dr. Jacqueline Gonsales. Screen failed for Triomphe study     CTA chest, abdomen and pelvis 11/30/22  Status post graft repair of the ascending aorta and portion of aortic arch.  Dissecting aneurysm of the descending thoracic aorta (measuring up to 5.7 cm) and abdominal aorta (3.5 cm infrarenal), similar to the MR angiogram of 9/19/2022.  Nonocclusive dissection flap present within the innominate artery, aneurysmal right subclavian artery and proximal right common carotid artery as well as aneurysmal left common iliac artery.    Patient denies fever, chills, fatigue, headache, blurred vision, dizziness, syncope, palpitations, shortness of breath, orthopnea, paroxysmal nocturnal dyspnea, nausea, vomiting, abdominal pain, back pain, BRBPR or swelling to legs.    Patient s/p diagnostic cardiac cath (3/9/23): SVG-RCA patent, remaining vessels luminal irregularities. ACCESS: L radial w/ TR band for hemostasis.     The patient was evaluated by CT surgery and is deemed a candidate for a reop total arch replacement with Thoraflex.    Patient seen in same day holding area; Reports no changes to PMHx or medications since last seen by our team. Denies acute or current SOB, chest pain, palpitation, N/V/D, fever/chills, recent illness, or any other concerning symptoms.  Admit under Dr. Pierre  via same day surgery. Consent signed, placed on chart.  Risks/benefits reviewed, patient understands and agrees. T&S ordered and blood products placed on hold for OR.  To 9EA  post-op.

## 2023-03-17 VITALS
HEART RATE: 52 BPM | RESPIRATION RATE: 17 BRPM | OXYGEN SATURATION: 99 % | TEMPERATURE: 98 F | DIASTOLIC BLOOD PRESSURE: 65 MMHG | WEIGHT: 169.98 LBS | SYSTOLIC BLOOD PRESSURE: 110 MMHG | HEIGHT: 72 IN

## 2023-03-17 NOTE — PRE-OP CHECKLIST - STERILIZATION AFFIRMATION
Transfer accept note    HPI:  57yo female w/schizophrenia, h/o TB and untreated for a long time, R lung collapse, bronchiectasis, now admitted for acute on chronic hypercapnic respiratory failure. Patient has been feeling bad for 10 days PTA, w/worsening of chronic cough and progressive dyspnea. No chest pain. No fevers. Patient was placed on BiPAP in the ED with initial improvement in hypercapnia, however while awaiting a floor bed patient had progressive decline in respiratory status so critical care team was recalled to re-evaluate for ICU admission. Currently patient is sleepy, but arouseable on BiPAP. No acute complaints. No dyspnea or chest pain.    ## ROS:  See above. ROS otherwise negative.    ## Vitals  ICU Vital Signs Last 24 Hrs  T(C): 36.6 (10 Tin 2018 18:45), Max: 37.2 (2018 22:36)  T(F): 97.8 (10 Tin 2018 18:45), Max: 98.9 (2018 22:36)  HR: 75 (10 Tin 2018 19:01) (67 - 90)  BP: 102/53 (10 Tin 2018 19:01) (91/67 - 132/70)  BP(mean): 65 (10 Tin 2018 19:01) (65 - 71)  ABP: --  ABP(mean): --  RR: 25 (10 Tin 2018 19:01) (16 - 25)  SpO2: 99% (10 Tin 2018 19:01) (95% - 100%)      ## Physical Exam:  Gen: Obese female lying in bed, NAD, easily arouseable.  HEENT: Atraumatic, sclerae anicteric  Resp: No increased WOB. Comfortable on BiPAP  CV: S1, S2  Abd: Soft, NT/ND. + bowel sounds  Ext: + edmea  Neuro: Moves all extremities    ## Vent Data      ## Labs:  Chem:  01-10    141  |  102  |  14  ----------------------------<  98  4.4   |  33<H>  |  0.58    Ca    8.0<L>      10 Tin 2018 04:22    TPro  7.7  /  Alb  2.9<L>  /  TBili  0.5  /  DBili  x   /  AST  26  /  ALT  14  /  AlkPhos  88  01-09    LIVER FUNCTIONS - ( 2018 12:12 )  Alb: 2.9 g/dL / Pro: 7.7 gm/dL / ALK PHOS: 88 U/L / ALT: 14 U/L / AST: 26 U/L / GGT: x           CBC:                        11.9   7.3   )-----------( 122      ( 10 Tin 2018 04:22 )             38.1     Coags:  PT/INR - ( 2018 12:12 )   PT: 14.3 sec;   INR: 1.30 ratio         PTT - ( 2018 12:12 )  PTT:29.2 sec    Urinalysis Basic - ( 2018 22:14 )    Color: Yellow / Appearance: Clear / S.020 / pH: x  Gluc: x / Ketone: Negative  / Bili: Negative / Urobili: Negative mg/dL   Blood: x / Protein: 30 mg/dL / Nitrite: Negative   Leuk Esterase: Negative / RBC: 11-25 /HPF / WBC 0-2   Sq Epi: x / Non Sq Epi: Occasional / Bacteria: Occasional        ## Cardiac  CARDIAC MARKERS ( 10 Tin 2018 15:43 )  .521 ng/mL / x     / x     / x     / x      CARDIAC MARKERS ( 10 Tin 2018 04:22 )  .733 ng/mL / x     / x     / x     / x      CARDIAC MARKERS ( 2018 20:43 )  1.290 ng/mL / x     / x     / x     / x      CARDIAC MARKERS ( 2018 12:12 )  1.500 ng/mL / x     / 212 U/L / x     / 3.7 ng/mL        ## Blood Gas  ABG - ( 10 Tin 2018 10:35 )  pH: x     /  pCO2: 77    /  pO2: 90    / HCO3: 34    / Base Excess: 5.0   /  SaO2: 95                  #I/Os  I&O's Detail      ## Imaging:    ## Medications:  MEDICATIONS  (STANDING):  ARIPiprazole 10 milliGRAM(s) Oral two times a day  aspirin enteric coated 81 milliGRAM(s) Oral daily  azithromycin  IVPB 500 milliGRAM(s) IV Intermittent every 24 hours  azithromycin  IVPB      benztropine 2 milliGRAM(s) Oral two times a day  diVALproex  milliGRAM(s) Oral two times a day  enoxaparin Injectable 40 milliGRAM(s) SubCutaneous daily  piperacillin/tazobactam IVPB. 3.375 Gram(s) IV Intermittent every 8 hours  sertraline 100 milliGRAM(s) Oral two times a day  vancomycin  IVPB 1000 milliGRAM(s) IV Intermittent every 12 hours    MEDICATIONS  (PRN):      CODE STATUS: [X ] full code  [ ] DNR  [ ] DNI  [ ] MOLST  Goals of care discussion: [ ] yes n/a

## 2023-03-17 NOTE — PRE-OP CHECKLIST - SELECT TESTS ORDERED
CT ANGIO CHEST, TTE, MRI BRAIN, B/L CAROTIDS, CARDIAC CATH,/CBC/CMP/PT/PTT/INR/Type and Screen/Urinalysis/CXR/COVID-19

## 2023-03-17 NOTE — PRE-OP CHECKLIST - BSA (M2)
1.99

## 2023-03-19 ENCOUNTER — TRANSCRIPTION ENCOUNTER (OUTPATIENT)
Age: 53
End: 2023-03-19

## 2023-03-19 ENCOUNTER — RESULT REVIEW (OUTPATIENT)
Age: 53
End: 2023-03-19

## 2023-03-20 ENCOUNTER — APPOINTMENT (OUTPATIENT)
Dept: CARDIOTHORACIC SURGERY | Facility: HOSPITAL | Age: 53
End: 2023-03-20
Payer: COMMERCIAL

## 2023-03-20 ENCOUNTER — FORM ENCOUNTER (OUTPATIENT)
Age: 53
End: 2023-03-20

## 2023-03-20 ENCOUNTER — TRANSCRIPTION ENCOUNTER (OUTPATIENT)
Age: 53
End: 2023-03-20

## 2023-03-20 ENCOUNTER — INPATIENT (INPATIENT)
Facility: HOSPITAL | Age: 53
LOS: 20 days | Discharge: HOME HEALTH | DRG: 219 | End: 2023-04-10
Attending: SURGERY | Admitting: SURGERY
Payer: COMMERCIAL

## 2023-03-20 DIAGNOSIS — Z95.1 PRESENCE OF AORTOCORONARY BYPASS GRAFT: Chronic | ICD-10-CM

## 2023-03-20 DIAGNOSIS — Z95.828 PRESENCE OF OTHER VASCULAR IMPLANTS AND GRAFTS: Chronic | ICD-10-CM

## 2023-03-20 LAB
ALBUMIN SERPL ELPH-MCNC: 2.7 G/DL — LOW (ref 3.3–5)
ALP SERPL-CCNC: 64 U/L — SIGNIFICANT CHANGE UP (ref 40–120)
ALT FLD-CCNC: 21 U/L — SIGNIFICANT CHANGE UP (ref 10–45)
ANION GAP SERPL CALC-SCNC: 19 MMOL/L — HIGH (ref 5–17)
ANISOCYTOSIS BLD QL: SLIGHT — SIGNIFICANT CHANGE UP
APTT BLD: 40.7 SEC — HIGH (ref 27.5–35.5)
AST SERPL-CCNC: 72 U/L — HIGH (ref 10–40)
BASE EXCESS BLDA CALC-SCNC: -0.7 MMOL/L — SIGNIFICANT CHANGE UP (ref -2–3)
BASE EXCESS BLDA CALC-SCNC: -0.7 MMOL/L — SIGNIFICANT CHANGE UP (ref -2–3)
BASE EXCESS BLDA CALC-SCNC: -1.4 MMOL/L — SIGNIFICANT CHANGE UP (ref -2–3)
BASE EXCESS BLDA CALC-SCNC: -3.1 MMOL/L — LOW (ref -2–3)
BASE EXCESS BLDA CALC-SCNC: -4.3 MMOL/L — LOW (ref -2–3)
BASE EXCESS BLDA CALC-SCNC: -4.4 MMOL/L — LOW (ref -2–3)
BASE EXCESS BLDA CALC-SCNC: -5.3 MMOL/L — LOW (ref -2–3)
BASE EXCESS BLDA CALC-SCNC: -6.7 MMOL/L — LOW (ref -2–3)
BASE EXCESS BLDA CALC-SCNC: -6.8 MMOL/L — LOW (ref -2–3)
BASE EXCESS BLDA CALC-SCNC: 0.2 MMOL/L — SIGNIFICANT CHANGE UP (ref -2–3)
BASE EXCESS BLDA CALC-SCNC: 0.2 MMOL/L — SIGNIFICANT CHANGE UP (ref -2–3)
BASE EXCESS BLDA CALC-SCNC: 1.3 MMOL/L — SIGNIFICANT CHANGE UP (ref -2–3)
BASE EXCESS BLDA CALC-SCNC: 1.4 MMOL/L — SIGNIFICANT CHANGE UP (ref -2–3)
BASE EXCESS BLDA CALC-SCNC: 2.2 MMOL/L — SIGNIFICANT CHANGE UP (ref -2–3)
BASE EXCESS BLDA CALC-SCNC: 2.2 MMOL/L — SIGNIFICANT CHANGE UP (ref -2–3)
BASE EXCESS BLDA CALC-SCNC: 3.3 MMOL/L — HIGH (ref -2–3)
BASE EXCESS BLDV CALC-SCNC: -5.7 MMOL/L — LOW (ref -2–3)
BASE EXCESS BLDV CALC-SCNC: 3.6 MMOL/L — HIGH (ref -2–3)
BASOPHILS # BLD AUTO: 0 K/UL — SIGNIFICANT CHANGE UP (ref 0–0.2)
BASOPHILS NFR BLD AUTO: 0 % — SIGNIFICANT CHANGE UP (ref 0–2)
BILIRUB SERPL-MCNC: 1 MG/DL — SIGNIFICANT CHANGE UP (ref 0.2–1.2)
BLD GP AB SCN SERPL QL: NEGATIVE — SIGNIFICANT CHANGE UP
BUN SERPL-MCNC: 18 MG/DL — SIGNIFICANT CHANGE UP (ref 7–23)
BURR CELLS BLD QL SMEAR: PRESENT — SIGNIFICANT CHANGE UP
CA-I BLDA-SCNC: 0.8 MMOL/L — LOW (ref 1.15–1.33)
CA-I BLDA-SCNC: 0.8 MMOL/L — LOW (ref 1.15–1.33)
CA-I BLDA-SCNC: 0.83 MMOL/L — LOW (ref 1.15–1.33)
CA-I BLDA-SCNC: 0.91 MMOL/L — LOW (ref 1.15–1.33)
CA-I BLDA-SCNC: 1.01 MMOL/L — LOW (ref 1.15–1.33)
CA-I BLDA-SCNC: 1.03 MMOL/L — LOW (ref 1.15–1.33)
CA-I BLDA-SCNC: 1.06 MMOL/L — LOW (ref 1.15–1.33)
CA-I BLDA-SCNC: 1.07 MMOL/L — LOW (ref 1.15–1.33)
CA-I BLDA-SCNC: 1.07 MMOL/L — LOW (ref 1.15–1.33)
CA-I BLDA-SCNC: 1.11 MMOL/L — LOW (ref 1.15–1.33)
CA-I BLDA-SCNC: 1.14 MMOL/L — LOW (ref 1.15–1.33)
CA-I BLDA-SCNC: 1.14 MMOL/L — LOW (ref 1.15–1.33)
CA-I BLDA-SCNC: 1.17 MMOL/L — SIGNIFICANT CHANGE UP (ref 1.15–1.33)
CA-I BLDA-SCNC: 1.2 MMOL/L — SIGNIFICANT CHANGE UP (ref 1.15–1.33)
CA-I BLDA-SCNC: 1.21 MMOL/L — SIGNIFICANT CHANGE UP (ref 1.15–1.33)
CA-I BLDA-SCNC: 2 MMOL/L — CRITICAL HIGH (ref 1.15–1.33)
CA-I SERPL-SCNC: 1.11 MMOL/L — LOW (ref 1.15–1.33)
CA-I SERPL-SCNC: 1.11 MMOL/L — LOW (ref 1.15–1.33)
CALCIUM SERPL-MCNC: 8.3 MG/DL — LOW (ref 8.4–10.5)
CHLORIDE SERPL-SCNC: 107 MMOL/L — SIGNIFICANT CHANGE UP (ref 96–108)
CO2 BLDA-SCNC: 22 MMOL/L — SIGNIFICANT CHANGE UP (ref 19–24)
CO2 BLDA-SCNC: 23 MMOL/L — SIGNIFICANT CHANGE UP (ref 19–24)
CO2 BLDA-SCNC: 23 MMOL/L — SIGNIFICANT CHANGE UP (ref 19–24)
CO2 BLDA-SCNC: 24 MMOL/L — SIGNIFICANT CHANGE UP (ref 19–24)
CO2 BLDA-SCNC: 24 MMOL/L — SIGNIFICANT CHANGE UP (ref 19–24)
CO2 BLDA-SCNC: 26 MMOL/L — HIGH (ref 19–24)
CO2 BLDA-SCNC: 27 MMOL/L — HIGH (ref 19–24)
CO2 BLDA-SCNC: 27 MMOL/L — HIGH (ref 19–24)
CO2 BLDA-SCNC: 28 MMOL/L — HIGH (ref 19–24)
CO2 BLDA-SCNC: 29 MMOL/L — HIGH (ref 19–24)
CO2 BLDA-SCNC: 29 MMOL/L — HIGH (ref 19–24)
CO2 BLDV-SCNC: 23 MMOL/L — SIGNIFICANT CHANGE UP (ref 22–26)
CO2 BLDV-SCNC: 31.5 MMOL/L — HIGH (ref 22–26)
CO2 SERPL-SCNC: 23 MMOL/L — SIGNIFICANT CHANGE UP (ref 22–31)
COHGB MFR BLDA: 1.8 % — SIGNIFICANT CHANGE UP
COHGB MFR BLDA: 1.8 % — SIGNIFICANT CHANGE UP
COHGB MFR BLDA: 2.4 % — SIGNIFICANT CHANGE UP
COHGB MFR BLDA: 2.6 % — SIGNIFICANT CHANGE UP
COHGB MFR BLDA: 2.8 % — SIGNIFICANT CHANGE UP
COHGB MFR BLDA: 2.8 % — SIGNIFICANT CHANGE UP
COHGB MFR BLDA: 2.9 % — SIGNIFICANT CHANGE UP
COHGB MFR BLDA: 3 % — SIGNIFICANT CHANGE UP
COHGB MFR BLDA: 3.2 % — HIGH
COHGB MFR BLDA: 3.2 % — HIGH
COHGB MFR BLDA: 3.7 % — HIGH
COHGB MFR BLDA: 3.8 % — HIGH
COHGB MFR BLDA: 4.3 % — HIGH
COHGB MFR BLDA: 7.4 % — HIGH
COHGB MFR BLDV: 3.7 % — HIGH
CREAT SERPL-MCNC: 1.44 MG/DL — HIGH (ref 0.5–1.3)
EGFR: 58 ML/MIN/1.73M2 — LOW
EOSINOPHIL # BLD AUTO: 0.28 K/UL — SIGNIFICANT CHANGE UP (ref 0–0.5)
EOSINOPHIL NFR BLD AUTO: 1.7 % — SIGNIFICANT CHANGE UP (ref 0–6)
GAS PNL BLDA: SIGNIFICANT CHANGE UP
GAS PNL BLDA: SIGNIFICANT CHANGE UP
GAS PNL BLDV: 138 MMOL/L — SIGNIFICANT CHANGE UP (ref 136–145)
GAS PNL BLDV: 145 MMOL/L — SIGNIFICANT CHANGE UP (ref 136–145)
GAS PNL BLDV: SIGNIFICANT CHANGE UP
GAS PNL BLDV: SIGNIFICANT CHANGE UP
GLUCOSE BLDA-MCNC: 101 MG/DL — HIGH (ref 70–99)
GLUCOSE BLDA-MCNC: 105 MG/DL — HIGH (ref 70–99)
GLUCOSE BLDA-MCNC: 111 MG/DL — HIGH (ref 70–99)
GLUCOSE BLDA-MCNC: 116 MG/DL — HIGH (ref 70–99)
GLUCOSE BLDA-MCNC: 132 MG/DL — HIGH (ref 70–99)
GLUCOSE BLDA-MCNC: 137 MG/DL — HIGH (ref 70–99)
GLUCOSE BLDA-MCNC: 140 MG/DL — HIGH (ref 70–99)
GLUCOSE BLDA-MCNC: 147 MG/DL — HIGH (ref 70–99)
GLUCOSE BLDA-MCNC: 150 MG/DL — HIGH (ref 70–99)
GLUCOSE BLDA-MCNC: 162 MG/DL — HIGH (ref 70–99)
GLUCOSE BLDA-MCNC: 207 MG/DL — HIGH (ref 70–99)
GLUCOSE BLDA-MCNC: 65 MG/DL — LOW (ref 70–99)
GLUCOSE BLDA-MCNC: 82 MG/DL — SIGNIFICANT CHANGE UP (ref 70–99)
GLUCOSE BLDA-MCNC: 94 MG/DL — SIGNIFICANT CHANGE UP (ref 70–99)
GLUCOSE BLDA-MCNC: 96 MG/DL — SIGNIFICANT CHANGE UP (ref 70–99)
GLUCOSE BLDA-MCNC: 96 MG/DL — SIGNIFICANT CHANGE UP (ref 70–99)
GLUCOSE BLDC GLUCOMTR-MCNC: 132 MG/DL — HIGH (ref 70–99)
GLUCOSE BLDV-MCNC: 99 MG/DL — SIGNIFICANT CHANGE UP (ref 70–99)
GLUCOSE SERPL-MCNC: 120 MG/DL — HIGH (ref 70–99)
GRAM STN FLD: SIGNIFICANT CHANGE UP
HCO3 BLDA-SCNC: 20 MMOL/L — LOW (ref 21–28)
HCO3 BLDA-SCNC: 20 MMOL/L — LOW (ref 21–28)
HCO3 BLDA-SCNC: 21 MMOL/L — SIGNIFICANT CHANGE UP (ref 21–28)
HCO3 BLDA-SCNC: 22 MMOL/L — SIGNIFICANT CHANGE UP (ref 21–28)
HCO3 BLDA-SCNC: 23 MMOL/L — SIGNIFICANT CHANGE UP (ref 21–28)
HCO3 BLDA-SCNC: 23 MMOL/L — SIGNIFICANT CHANGE UP (ref 21–28)
HCO3 BLDA-SCNC: 25 MMOL/L — SIGNIFICANT CHANGE UP (ref 21–28)
HCO3 BLDA-SCNC: 26 MMOL/L — SIGNIFICANT CHANGE UP (ref 21–28)
HCO3 BLDA-SCNC: 27 MMOL/L — SIGNIFICANT CHANGE UP (ref 21–28)
HCO3 BLDA-SCNC: 28 MMOL/L — SIGNIFICANT CHANGE UP (ref 21–28)
HCO3 BLDV-SCNC: 22 MMOL/L — SIGNIFICANT CHANGE UP (ref 22–29)
HCO3 BLDV-SCNC: 30 MMOL/L — HIGH (ref 22–29)
HCT VFR BLD CALC: 34.3 % — LOW (ref 39–50)
HCT VFR BLDA CALC: 24 % — SIGNIFICANT CHANGE UP
HGB BLD CALC-MCNC: 8 G/DL — LOW (ref 12.6–17.4)
HGB BLD-MCNC: 11.4 G/DL — LOW (ref 13–17)
HGB BLDA-MCNC: 12.7 G/DL — SIGNIFICANT CHANGE UP (ref 12.6–17.4)
HGB BLDA-MCNC: 7.2 G/DL — LOW (ref 12.6–17.4)
HGB BLDA-MCNC: 7.5 G/DL — LOW (ref 12.6–17.4)
HGB BLDA-MCNC: 7.9 G/DL — LOW (ref 12.6–17.4)
HGB BLDA-MCNC: 8.3 G/DL — LOW (ref 12.6–17.4)
HGB BLDA-MCNC: 8.6 G/DL — LOW (ref 12.6–17.4)
HGB BLDA-MCNC: 8.6 G/DL — LOW (ref 12.6–17.4)
HGB BLDA-MCNC: 8.8 G/DL — LOW (ref 12.6–17.4)
HGB BLDA-MCNC: 8.8 G/DL — LOW (ref 12.6–17.4)
HGB BLDA-MCNC: 9 G/DL — LOW (ref 12.6–17.4)
HGB BLDA-MCNC: 9.5 G/DL — LOW (ref 12.6–17.4)
HGB BLDA-MCNC: 9.6 G/DL — LOW (ref 12.6–17.4)
HGB BLDA-MCNC: 9.8 G/DL — LOW (ref 12.6–17.4)
HGB BLDA-MCNC: 9.9 G/DL — LOW (ref 12.6–17.4)
HGB BLDA-MCNC: <6.5 G/DL — CRITICAL LOW (ref 12.6–17.4)
HGB BLDA-MCNC: <6.5 G/DL — CRITICAL LOW (ref 12.6–17.4)
INR BLD: 1.13 — SIGNIFICANT CHANGE UP (ref 0.88–1.16)
ISTAT ARTERIAL BE: -1 MMOL/L — SIGNIFICANT CHANGE UP (ref -2–3)
ISTAT ARTERIAL GLUCOSE: 111 MG/DL — HIGH (ref 70–99)
ISTAT ARTERIAL HCO3: 25 MMOL/L — SIGNIFICANT CHANGE UP (ref 22–26)
ISTAT ARTERIAL HEMATOCRIT: 31 % — LOW (ref 39–50)
ISTAT ARTERIAL HEMOGLOBIN: 10.5 G/DL — LOW (ref 13–17)
ISTAT ARTERIAL IONIZED CALCIUM: 1.07 MMOL/L — LOW (ref 1.12–1.3)
ISTAT ARTERIAL PCO2: 52 MMHG — HIGH (ref 35–45)
ISTAT ARTERIAL PH: 7.3 — LOW (ref 7.35–7.45)
ISTAT ARTERIAL PO2: 243 MMHG — HIGH (ref 80–105)
ISTAT ARTERIAL POTASSIUM: 3.5 MMOL/L — SIGNIFICANT CHANGE UP (ref 3.5–5.3)
ISTAT ARTERIAL SO2: 100 % — HIGH (ref 95–98)
ISTAT ARTERIAL SODIUM: 146 MMOL/L — HIGH (ref 135–145)
ISTAT ARTERIAL TCO2: 27 MMOL/L — SIGNIFICANT CHANGE UP (ref 22–31)
LACTATE SERPL-SCNC: 8.2 MMOL/L — CRITICAL HIGH (ref 0.5–2)
LYMPHOCYTES # BLD AUTO: 1.45 K/UL — SIGNIFICANT CHANGE UP (ref 1–3.3)
LYMPHOCYTES # BLD AUTO: 8.7 % — LOW (ref 13–44)
MANUAL SMEAR VERIFICATION: SIGNIFICANT CHANGE UP
MCHC RBC-ENTMCNC: 30.6 PG — SIGNIFICANT CHANGE UP (ref 27–34)
MCHC RBC-ENTMCNC: 33.2 GM/DL — SIGNIFICANT CHANGE UP (ref 32–36)
MCV RBC AUTO: 92.2 FL — SIGNIFICANT CHANGE UP (ref 80–100)
METHGB MFR BLDA: 0 % — SIGNIFICANT CHANGE UP
METHGB MFR BLDA: 0.2 % — SIGNIFICANT CHANGE UP
METHGB MFR BLDA: 0.3 % — SIGNIFICANT CHANGE UP
METHGB MFR BLDA: 0.4 % — SIGNIFICANT CHANGE UP
METHGB MFR BLDA: 0.5 % — SIGNIFICANT CHANGE UP
METHGB MFR BLDA: 0.7 % — SIGNIFICANT CHANGE UP
METHGB MFR BLDA: 0.8 % — SIGNIFICANT CHANGE UP
METHGB MFR BLDA: 0.8 % — SIGNIFICANT CHANGE UP
METHGB MFR BLDA: 1.3 % — SIGNIFICANT CHANGE UP
METHGB MFR BLDA: 1.5 % — SIGNIFICANT CHANGE UP
METHGB MFR BLDA: 1.7 % — HIGH
METHGB MFR BLDV: 1.2 % — SIGNIFICANT CHANGE UP
MICROCYTES BLD QL: SLIGHT — SIGNIFICANT CHANGE UP
MONOCYTES # BLD AUTO: 0.72 K/UL — SIGNIFICANT CHANGE UP (ref 0–0.9)
MONOCYTES NFR BLD AUTO: 4.3 % — SIGNIFICANT CHANGE UP (ref 2–14)
NEUTROPHILS # BLD AUTO: 14.19 K/UL — HIGH (ref 1.8–7.4)
NEUTROPHILS NFR BLD AUTO: 78.3 % — HIGH (ref 43–77)
NEUTS BAND # BLD: 7 % — SIGNIFICANT CHANGE UP (ref 0–8)
OVALOCYTES BLD QL SMEAR: SLIGHT — SIGNIFICANT CHANGE UP
OXYHGB MFR BLDA: 91.3 % — SIGNIFICANT CHANGE UP (ref 90–95)
OXYHGB MFR BLDA: 94.8 % — SIGNIFICANT CHANGE UP (ref 90–95)
OXYHGB MFR BLDA: 95.1 % — HIGH (ref 90–95)
OXYHGB MFR BLDA: 95.4 % — HIGH (ref 90–95)
OXYHGB MFR BLDA: 95.8 % — HIGH (ref 90–95)
OXYHGB MFR BLDA: 96 % — HIGH (ref 90–95)
OXYHGB MFR BLDA: 96.1 % — HIGH (ref 90–95)
OXYHGB MFR BLDA: 96.1 % — HIGH (ref 90–95)
OXYHGB MFR BLDA: 96.2 % — HIGH (ref 90–95)
OXYHGB MFR BLDA: 96.3 % — HIGH (ref 90–95)
OXYHGB MFR BLDA: 96.5 % — HIGH (ref 90–95)
OXYHGB MFR BLDA: 96.5 % — HIGH (ref 90–95)
OXYHGB MFR BLDA: 96.6 % — HIGH (ref 90–95)
OXYHGB MFR BLDA: 96.7 % — HIGH (ref 90–95)
OXYHGB MFR BLDA: 96.8 % — HIGH (ref 90–95)
OXYHGB MFR BLDA: 97.2 % — HIGH (ref 90–95)
PCO2 BLDA: 29 MMHG — LOW (ref 35–48)
PCO2 BLDA: 31 MMHG — LOW (ref 35–48)
PCO2 BLDA: 32 MMHG — LOW (ref 35–48)
PCO2 BLDA: 39 MMHG — SIGNIFICANT CHANGE UP (ref 35–48)
PCO2 BLDA: 41 MMHG — SIGNIFICANT CHANGE UP (ref 35–48)
PCO2 BLDA: 42 MMHG — SIGNIFICANT CHANGE UP (ref 35–48)
PCO2 BLDA: 43 MMHG — SIGNIFICANT CHANGE UP (ref 35–48)
PCO2 BLDA: 43 MMHG — SIGNIFICANT CHANGE UP (ref 35–48)
PCO2 BLDA: 44 MMHG — SIGNIFICANT CHANGE UP (ref 35–48)
PCO2 BLDA: 45 MMHG — SIGNIFICANT CHANGE UP (ref 35–48)
PCO2 BLDA: 46 MMHG — SIGNIFICANT CHANGE UP (ref 35–48)
PCO2 BLDA: 48 MMHG — SIGNIFICANT CHANGE UP (ref 35–48)
PCO2 BLDA: 49 MMHG — HIGH (ref 35–48)
PCO2 BLDA: 50 MMHG — HIGH (ref 35–48)
PCO2 BLDV: 48 MMHG — SIGNIFICANT CHANGE UP (ref 42–55)
PCO2 BLDV: 54 MMHG — SIGNIFICANT CHANGE UP (ref 42–55)
PH BLDA: 7.22 — LOW (ref 7.35–7.45)
PH BLDA: 7.25 — LOW (ref 7.35–7.45)
PH BLDA: 7.25 — LOW (ref 7.35–7.45)
PH BLDA: 7.28 — LOW (ref 7.35–7.45)
PH BLDA: 7.32 — LOW (ref 7.35–7.45)
PH BLDA: 7.36 — SIGNIFICANT CHANGE UP (ref 7.35–7.45)
PH BLDA: 7.38 — SIGNIFICANT CHANGE UP (ref 7.35–7.45)
PH BLDA: 7.39 — SIGNIFICANT CHANGE UP (ref 7.35–7.45)
PH BLDA: 7.4 — SIGNIFICANT CHANGE UP (ref 7.35–7.45)
PH BLDA: 7.43 — SIGNIFICANT CHANGE UP (ref 7.35–7.45)
PH BLDA: 7.44 — SIGNIFICANT CHANGE UP (ref 7.35–7.45)
PH BLDA: 7.44 — SIGNIFICANT CHANGE UP (ref 7.35–7.45)
PH BLDA: 7.46 — HIGH (ref 7.35–7.45)
PH BLDA: 7.48 — HIGH (ref 7.35–7.45)
PH BLDV: 7.26 — LOW (ref 7.32–7.43)
PH BLDV: 7.35 — SIGNIFICANT CHANGE UP (ref 7.32–7.43)
PLAT MORPH BLD: NORMAL — SIGNIFICANT CHANGE UP
PLATELET # BLD AUTO: 178 K/UL — SIGNIFICANT CHANGE UP (ref 150–400)
PO2 BLDA: 113 MMHG — HIGH (ref 83–108)
PO2 BLDA: 165 MMHG — HIGH (ref 83–108)
PO2 BLDA: 268 MMHG — HIGH (ref 83–108)
PO2 BLDA: 343 MMHG — HIGH (ref 83–108)
PO2 BLDA: 398 MMHG — HIGH (ref 83–108)
PO2 BLDA: 408 MMHG — HIGH (ref 83–108)
PO2 BLDA: 411 MMHG — HIGH (ref 83–108)
PO2 BLDA: 437 MMHG — HIGH (ref 83–108)
PO2 BLDA: 474 MMHG — HIGH (ref 83–108)
PO2 BLDA: 491 MMHG — HIGH (ref 83–108)
PO2 BLDA: 504 MMHG — HIGH (ref 83–108)
PO2 BLDA: 530 MMHG — HIGH (ref 83–108)
PO2 BLDA: 561 MMHG — HIGH (ref 83–108)
PO2 BLDA: 92 MMHG — SIGNIFICANT CHANGE UP (ref 83–108)
PO2 BLDA: 92 MMHG — SIGNIFICANT CHANGE UP (ref 83–108)
PO2 BLDA: >614 MMHG — HIGH (ref 83–108)
PO2 BLDV: 43 MMHG — SIGNIFICANT CHANGE UP (ref 25–45)
PO2 BLDV: 45 MMHG — SIGNIFICANT CHANGE UP (ref 25–45)
POIKILOCYTOSIS BLD QL AUTO: SLIGHT — SIGNIFICANT CHANGE UP
POLYCHROMASIA BLD QL SMEAR: SLIGHT — SIGNIFICANT CHANGE UP
POTASSIUM BLDA-SCNC: 3.5 MMOL/L — SIGNIFICANT CHANGE UP (ref 3.5–5.1)
POTASSIUM BLDA-SCNC: 3.9 MMOL/L — SIGNIFICANT CHANGE UP (ref 3.5–5.1)
POTASSIUM BLDA-SCNC: 4.1 MMOL/L — SIGNIFICANT CHANGE UP (ref 3.5–5.1)
POTASSIUM BLDA-SCNC: 4.1 MMOL/L — SIGNIFICANT CHANGE UP (ref 3.5–5.1)
POTASSIUM BLDA-SCNC: 4.3 MMOL/L — SIGNIFICANT CHANGE UP (ref 3.5–5.1)
POTASSIUM BLDA-SCNC: 4.4 MMOL/L — SIGNIFICANT CHANGE UP (ref 3.5–5.1)
POTASSIUM BLDA-SCNC: 4.6 MMOL/L — SIGNIFICANT CHANGE UP (ref 3.5–5.1)
POTASSIUM BLDA-SCNC: 4.7 MMOL/L — SIGNIFICANT CHANGE UP (ref 3.5–5.1)
POTASSIUM BLDA-SCNC: 4.8 MMOL/L — SIGNIFICANT CHANGE UP (ref 3.5–5.1)
POTASSIUM BLDA-SCNC: 5.2 MMOL/L — HIGH (ref 3.5–5.1)
POTASSIUM BLDA-SCNC: 5.2 MMOL/L — HIGH (ref 3.5–5.1)
POTASSIUM BLDA-SCNC: 6.3 MMOL/L — CRITICAL HIGH (ref 3.5–5.1)
POTASSIUM BLDA-SCNC: 7.1 MMOL/L — CRITICAL HIGH (ref 3.5–5.1)
POTASSIUM BLDA-SCNC: 7.5 MMOL/L — CRITICAL HIGH (ref 3.5–5.1)
POTASSIUM BLDV-SCNC: 3.2 MMOL/L — LOW (ref 3.5–5.1)
POTASSIUM BLDV-SCNC: 3.6 MMOL/L — SIGNIFICANT CHANGE UP (ref 3.5–5.1)
POTASSIUM SERPL-MCNC: 3.6 MMOL/L — SIGNIFICANT CHANGE UP (ref 3.5–5.3)
POTASSIUM SERPL-SCNC: 3.6 MMOL/L — SIGNIFICANT CHANGE UP (ref 3.5–5.3)
PROT SERPL-MCNC: 4.6 G/DL — LOW (ref 6–8.3)
PROTHROM AB SERPL-ACNC: 13.5 SEC — HIGH (ref 10.5–13.4)
RBC # BLD: 3.72 M/UL — LOW (ref 4.2–5.8)
RBC # FLD: 15.9 % — HIGH (ref 10.3–14.5)
RBC BLD AUTO: ABNORMAL
RH IG SCN BLD-IMP: POSITIVE — SIGNIFICANT CHANGE UP
SAO2 % BLDA: 100 % — HIGH (ref 94–98)
SAO2 % BLDA: 98.4 % — HIGH (ref 94–98)
SAO2 % BLDA: 99.3 % — HIGH (ref 94–98)
SAO2 % BLDA: 99.3 % — HIGH (ref 94–98)
SAO2 % BLDA: 99.6 % — HIGH (ref 94–98)
SAO2 % BLDA: 99.8 % — HIGH (ref 94–98)
SAO2 % BLDA: 99.9 % — HIGH (ref 94–98)
SAO2 % BLDA: 99.9 % — HIGH (ref 94–98)
SAO2 % BLDV: 74.8 % — SIGNIFICANT CHANGE UP (ref 67–88)
SAO2 % BLDV: 75 % — SIGNIFICANT CHANGE UP (ref 67–88)
SODIUM BLDA-SCNC: 135 MMOL/L — LOW (ref 136–145)
SODIUM BLDA-SCNC: 135 MMOL/L — LOW (ref 136–145)
SODIUM BLDA-SCNC: 136 MMOL/L — SIGNIFICANT CHANGE UP (ref 136–145)
SODIUM BLDA-SCNC: 136 MMOL/L — SIGNIFICANT CHANGE UP (ref 136–145)
SODIUM BLDA-SCNC: 137 MMOL/L — SIGNIFICANT CHANGE UP (ref 136–145)
SODIUM BLDA-SCNC: 138 MMOL/L — SIGNIFICANT CHANGE UP (ref 136–145)
SODIUM BLDA-SCNC: 138 MMOL/L — SIGNIFICANT CHANGE UP (ref 136–145)
SODIUM BLDA-SCNC: 140 MMOL/L — SIGNIFICANT CHANGE UP (ref 136–145)
SODIUM BLDA-SCNC: 141 MMOL/L — SIGNIFICANT CHANGE UP (ref 136–145)
SODIUM BLDA-SCNC: 143 MMOL/L — SIGNIFICANT CHANGE UP (ref 136–145)
SODIUM BLDA-SCNC: 143 MMOL/L — SIGNIFICANT CHANGE UP (ref 136–145)
SODIUM BLDA-SCNC: 145 MMOL/L — SIGNIFICANT CHANGE UP (ref 136–145)
SODIUM BLDA-SCNC: 146 MMOL/L — HIGH (ref 136–145)
SODIUM BLDA-SCNC: 147 MMOL/L — HIGH (ref 136–145)
SODIUM SERPL-SCNC: 149 MMOL/L — HIGH (ref 135–145)
SPECIMEN SOURCE: SIGNIFICANT CHANGE UP
SPHEROCYTES BLD QL SMEAR: SIGNIFICANT CHANGE UP
WBC # BLD: 16.64 K/UL — HIGH (ref 3.8–10.5)
WBC # FLD AUTO: 16.64 K/UL — HIGH (ref 3.8–10.5)

## 2023-03-20 PROCEDURE — 35626 BPG AOR-SUBCLA/INNOM/CAROTID: CPT | Mod: LT

## 2023-03-20 PROCEDURE — 33510 CABG VEIN SINGLE: CPT

## 2023-03-20 PROCEDURE — 33858 AS-AORT GRF F/AORTIC DSJ: CPT

## 2023-03-20 PROCEDURE — 33405 REPLACEMENT AORTIC VALVE OPN: CPT

## 2023-03-20 PROCEDURE — 33871 TRANSVRS A-ARCH GRF HYPTHRM: CPT | Mod: 59

## 2023-03-20 PROCEDURE — ZZZZZ: CPT

## 2023-03-20 DEVICE — CANNULA VENOUS 1 STAGE MALLEABLE 24FR X 1/4-3/8": Type: IMPLANTABLE DEVICE | Status: FUNCTIONAL

## 2023-03-20 DEVICE — SHUNT FLO-THRU INTRALUMINAL 2.5MM X 18MM: Type: IMPLANTABLE DEVICE | Status: FUNCTIONAL

## 2023-03-20 DEVICE — GWIRE VASC FEM VENOUS 035X180: Type: IMPLANTABLE DEVICE | Status: FUNCTIONAL

## 2023-03-20 DEVICE — SURGIFLO HEMOSTATIC MATRIX KIT: Type: IMPLANTABLE DEVICE | Status: FUNCTIONAL

## 2023-03-20 DEVICE — CANNULA AORTIC ROOT 14G X 14CM FLANGED: Type: IMPLANTABLE DEVICE | Status: FUNCTIONAL

## 2023-03-20 DEVICE — CATH SOFTVU BERENSTN 5FRX100: Type: IMPLANTABLE DEVICE | Status: FUNCTIONAL

## 2023-03-20 DEVICE — IMPLANTABLE DEVICE: Type: IMPLANTABLE DEVICE | Status: FUNCTIONAL

## 2023-03-20 DEVICE — KIT CVC 3LUM SPECTRUM 9FR: Type: IMPLANTABLE DEVICE | Status: FUNCTIONAL

## 2023-03-20 DEVICE — CANNULA ARTERIAL OPTISITE 20FR X 3/8" VENTED: Type: IMPLANTABLE DEVICE | Status: FUNCTIONAL

## 2023-03-20 DEVICE — LIGATING CLIPS WECK HEMOCLIP TANTALUM LARGE (ORANGE) 10: Type: IMPLANTABLE DEVICE | Status: FUNCTIONAL

## 2023-03-20 DEVICE — CATH VENT LEFT HEART SILICONE 16FR NON-VENTED: Type: IMPLANTABLE DEVICE | Status: FUNCTIONAL

## 2023-03-20 DEVICE — SHEATH INTRODUCER TERUMO PINNACLE PERIPHERAL 5FR X 10CM X 0.035" MINI WIRE: Type: IMPLANTABLE DEVICE | Status: FUNCTIONAL

## 2023-03-20 DEVICE — CANNULA FEM VENOUS RAP 23-25FR: Type: IMPLANTABLE DEVICE | Status: FUNCTIONAL

## 2023-03-20 DEVICE — CANNULA CORONARY RT ANG PERFUSION 7MM: Type: IMPLANTABLE DEVICE | Status: FUNCTIONAL

## 2023-03-20 DEVICE — INTRODUCER PERCUTANEOUS INSERTION KIT: Type: IMPLANTABLE DEVICE | Status: FUNCTIONAL

## 2023-03-20 DEVICE — VALVE AORTIC INSPIRIS RESILIA 23MM: Type: IMPLANTABLE DEVICE | Status: FUNCTIONAL

## 2023-03-20 DEVICE — FELT PTFE 1 X 6": Type: IMPLANTABLE DEVICE | Status: FUNCTIONAL

## 2023-03-20 DEVICE — CANNULA CORONARY OSTIAL 14FR X 6" BASKET TIP: Type: IMPLANTABLE DEVICE | Status: FUNCTIONAL

## 2023-03-20 DEVICE — CANNULA RETROGRADE CARDIOPLEGIA SELF-INFLATING 14FR PRE-SHAPED STYLET/HANDLE: Type: IMPLANTABLE DEVICE | Status: FUNCTIONAL

## 2023-03-20 DEVICE — GRAFT VASC STR 8MMX15CM GELWEAVE: Type: IMPLANTABLE DEVICE | Status: FUNCTIONAL

## 2023-03-20 DEVICE — CANNULA PERFUSION  BALLOON 6MM: Type: IMPLANTABLE DEVICE | Status: FUNCTIONAL

## 2023-03-20 DEVICE — BIOGLUE 10ML SYR: Type: IMPLANTABLE DEVICE | Status: FUNCTIONAL

## 2023-03-20 DEVICE — INTRO MICROPUNC 4FRX10CM SS: Type: IMPLANTABLE DEVICE | Status: FUNCTIONAL

## 2023-03-20 DEVICE — PACING WIRE STREAMLINE BIPOLAR MYOCARDIAL: Type: IMPLANTABLE DEVICE | Status: FUNCTIONAL

## 2023-03-20 RX ORDER — MEPERIDINE HYDROCHLORIDE 50 MG/ML
25 INJECTION INTRAMUSCULAR; INTRAVENOUS; SUBCUTANEOUS ONCE
Refills: 0 | Status: DISCONTINUED | OUTPATIENT
Start: 2023-03-20 | End: 2023-03-21

## 2023-03-20 RX ORDER — PANTOPRAZOLE SODIUM 20 MG/1
40 TABLET, DELAYED RELEASE ORAL DAILY
Refills: 0 | Status: DISCONTINUED | OUTPATIENT
Start: 2023-03-20 | End: 2023-04-04

## 2023-03-20 RX ORDER — DEXTROSE 50 % IN WATER 50 %
50 SYRINGE (ML) INTRAVENOUS
Refills: 0 | Status: DISCONTINUED | OUTPATIENT
Start: 2023-03-20 | End: 2023-04-10

## 2023-03-20 RX ORDER — CHLORHEXIDINE GLUCONATE 213 G/1000ML
5 SOLUTION TOPICAL
Refills: 0 | Status: DISCONTINUED | OUTPATIENT
Start: 2023-03-20 | End: 2023-03-21

## 2023-03-20 RX ORDER — INSULIN HUMAN 100 [IU]/ML
1 INJECTION, SOLUTION SUBCUTANEOUS
Qty: 50 | Refills: 0 | Status: DISCONTINUED | OUTPATIENT
Start: 2023-03-20 | End: 2023-03-22

## 2023-03-20 RX ORDER — LACOSAMIDE 50 MG/1
100 TABLET ORAL
Refills: 0 | Status: DISCONTINUED | OUTPATIENT
Start: 2023-03-20 | End: 2023-03-21

## 2023-03-20 RX ORDER — ASPIRIN/CALCIUM CARB/MAGNESIUM 324 MG
81 TABLET ORAL DAILY
Refills: 0 | Status: DISCONTINUED | OUTPATIENT
Start: 2023-03-21 | End: 2023-03-21

## 2023-03-20 RX ORDER — SODIUM CHLORIDE 9 MG/ML
1000 INJECTION INTRAMUSCULAR; INTRAVENOUS; SUBCUTANEOUS
Refills: 0 | Status: DISCONTINUED | OUTPATIENT
Start: 2023-03-20 | End: 2023-04-10

## 2023-03-20 RX ORDER — NOREPINEPHRINE BITARTRATE/D5W 8 MG/250ML
0.05 PLASTIC BAG, INJECTION (ML) INTRAVENOUS
Qty: 8 | Refills: 0 | Status: DISCONTINUED | OUTPATIENT
Start: 2023-03-20 | End: 2023-03-21

## 2023-03-20 RX ORDER — DIVALPROEX SODIUM 500 MG/1
500 TABLET, DELAYED RELEASE ORAL AT BEDTIME
Refills: 0 | Status: DISCONTINUED | OUTPATIENT
Start: 2023-03-20 | End: 2023-03-21

## 2023-03-20 RX ORDER — CHLORHEXIDINE GLUCONATE 213 G/1000ML
1 SOLUTION TOPICAL DAILY
Refills: 0 | Status: DISCONTINUED | OUTPATIENT
Start: 2023-03-20 | End: 2023-04-10

## 2023-03-20 RX ORDER — CHLORHEXIDINE GLUCONATE 213 G/1000ML
15 SOLUTION TOPICAL EVERY 12 HOURS
Refills: 0 | Status: DISCONTINUED | OUTPATIENT
Start: 2023-03-20 | End: 2023-03-24

## 2023-03-20 RX ORDER — HEPARIN SODIUM 5000 [USP'U]/ML
5000 INJECTION INTRAVENOUS; SUBCUTANEOUS EVERY 8 HOURS
Refills: 0 | Status: DISCONTINUED | OUTPATIENT
Start: 2023-03-20 | End: 2023-03-22

## 2023-03-20 RX ORDER — PROPOFOL 10 MG/ML
5 INJECTION, EMULSION INTRAVENOUS
Qty: 1000 | Refills: 0 | Status: DISCONTINUED | OUTPATIENT
Start: 2023-03-20 | End: 2023-03-24

## 2023-03-20 RX ORDER — EPINEPHRINE 0.3 MG/.3ML
0.05 INJECTION INTRAMUSCULAR; SUBCUTANEOUS
Qty: 4 | Refills: 0 | Status: DISCONTINUED | OUTPATIENT
Start: 2023-03-20 | End: 2023-03-21

## 2023-03-20 RX ORDER — CEFAZOLIN SODIUM 1 G
2000 VIAL (EA) INJECTION EVERY 8 HOURS
Refills: 0 | Status: DISCONTINUED | OUTPATIENT
Start: 2023-03-21 | End: 2023-03-21

## 2023-03-20 RX ORDER — DEXTROSE 50 % IN WATER 50 %
25 SYRINGE (ML) INTRAVENOUS
Refills: 0 | Status: DISCONTINUED | OUTPATIENT
Start: 2023-03-20 | End: 2023-04-10

## 2023-03-20 RX ORDER — MILRINONE LACTATE 1 MG/ML
0.25 INJECTION, SOLUTION INTRAVENOUS
Qty: 20 | Refills: 0 | Status: DISCONTINUED | OUTPATIENT
Start: 2023-03-20 | End: 2023-03-22

## 2023-03-20 RX ORDER — FUROSEMIDE 40 MG
5 TABLET ORAL
Qty: 500 | Refills: 0 | Status: DISCONTINUED | OUTPATIENT
Start: 2023-03-20 | End: 2023-03-21

## 2023-03-20 RX ADMIN — Medication 7.23 MICROGRAM(S)/KG/MIN: at 22:31

## 2023-03-20 NOTE — BRIEF OPERATIVE NOTE - NSICDXBRIEFPROCEDURE_GEN_ALL_CORE_FT
PROCEDURES:  Replacement of transverse aortic arch 20-Mar-2023 21:02:12 and aorto to axillary bypass Nely Laguna  Second stage thoracic endovascular aortic repair (TEVAR) 20-Mar-2023 21:02:35   Thoraxflex Nely Laguna

## 2023-03-20 NOTE — BRIEF OPERATIVE NOTE - NSICDXBRIEFPREOP_GEN_ALL_CORE_FT
PRE-OP DIAGNOSIS:  Chronic dissection of thoracic aorta 20-Mar-2023 21:03:20  Nely Laguna  Aortic aneurysm without rupture 20-Mar-2023 21:03:58  Nely Laguna

## 2023-03-20 NOTE — BRIEF OPERATIVE NOTE - COMMENTS
I was the first assistant for this case including but not limited to redo sternotomy and total arch replacement with thoraflex (TEVAR), aorto  to axillary bypass and AVR (23mm bio) and CABGx1 (SVG to RCA)

## 2023-03-20 NOTE — PROGRESS NOTE ADULT - SUBJECTIVE AND OBJECTIVE BOX
CTICU  CRITICAL  CARE  attending     Hand off received 					   Pertinent clinical, laboratory, radiographic, hemodynamic, echocardiographic, respiratory data, microbiologic data and chart were reviewed and analyzed frequently throughout the course of the day and night    52 year old male with HTN, type A aortic dissection s/p Dacron grafts, AV resuspension in 2013,CAD s/p CABG x 1 SVG to RCA (5/2013 with Dr. Medina), seizure disorder (last episode on 7/4/22).  He is a current every day marijuana user  CTA chest, abdomen and pelvis 11/30/22: Status post graft repair of the ascending aorta and portion of aortic arch.  Dissecting aneurysm of the descending thoracic aorta (measuring up to 5.7 cm) and abdominal aorta (3.5 cm infrarenal), similar to the MR angiogram of 9/19/2022.  Nonocclusive dissection flap present within the innominate artery, aneurysmal right subclavian artery and proximal right common carotid artery as well as aneurysmal left common iliac artery.    He was referred by Dr. Jacqueline Gonsales.  He ws screened for Triomphe study but did not qualify.    S/P AVR  Aortic arch replacement  S/P CABG (SVG to RCA).  S/P Aorto left axillary by pass.  S/P TEVAR to descending aorta.            FAMILY HISTORY:  No pertinent family history in first degree relatives    PAST MEDICAL & SURGICAL HISTORY:  HTN (hypertension)  Aortic dissection  CAD (coronary artery disease)  Seizure disorder  S/P aortic bifurcation bypass graft  S/P CABG x 1             14 system review was unremarkable    Vital signs, hemodynamic and respiratory parameters were reviewed from the bedside nursing flow sheet.  ICU Vital Signs Last 24 Hrs  T(C): 34 (20 Mar 2023 21:15), Max: 36.6 (20 Mar 2023 07:23)  T(F): 93.2 (20 Mar 2023 21:15), Max: 93.2 (20 Mar 2023 21:15)  HR: 110 (20 Mar 2023 21:45) (52 - 110)  BP: 110/65 (20 Mar 2023 07:23) (110/65 - 110/65)  BP(mean): --  ABP: 107/61 (20 Mar 2023 21:45) (107/61 - 126/66)  ABP(mean): 75 (20 Mar 2023 21:45) (75 - 91)  RR: 14 (20 Mar 2023 21:31) (14 - 17)  SpO2: 100% (20 Mar 2023 21:45) (95% - 100%)    O2 Parameters below as of 20 Mar 2023 21:31  Patient On (Oxygen Delivery Method): ventilator    O2 Concentration (%): 100      Adult Advanced Hemodynamics Last 24 Hrs  CVP(mm Hg): -1 (20 Mar 2023 21:45) (-2 - 0)  CVP(cm H2O): --  CO: --  CI: --  PA: 21/12 (20 Mar 2023 21:45) (21/10 - 22/12)  PA(mean): 16 (20 Mar 2023 21:45) (15 - 17)  PCWP: --  SVR: --  SVRI: --  PVR: --  PVRI: --, ABG - ( 20 Mar 2023 21:07 )  pH, Arterial: 7.28  pH, Blood: x     /  pCO2: 48    /  pO2: 230   / HCO3: 23    / Base Excess: -4.4  /  SaO2: 100.0             Mode: AC/ CMV (Assist Control/ Continuous Mandatory Ventilation)  RR (machine): 14  TV (machine): 550  FiO2: 100  PEEP: 5  ITime: 1  MAP: 14  PIP: 23    Intake and output was reviewed and the fluid balance was calculated  Daily Height in cm: 182.88 (20 Mar 2023 07:23)    Daily   I&O's Summary      All lines and drain sites were assessed    Neuro: No change in the mental status from the baseline. Moves all 4 extremities.  Neck: No JVD.  CVS: S1, S2, No S3.  Lungs: Good air entry bilaterally.   Abd: Soft. No tenderness. + Bowel sounds.  Vascular: + DP/PT.  Extremities: No edema.  Lymphatic: Normal.  Skin: No abnormalities.      labs  CBC Full  -  ( 20 Mar 2023 21:02 )  WBC Count : 16.64 K/uL  RBC Count : 3.72 M/uL  Hemoglobin : 11.4 g/dL  Hematocrit : 34.3 %  Platelet Count - Automated : 178 K/uL  Mean Cell Volume : 92.2 fl  Mean Cell Hemoglobin : 30.6 pg  Mean Cell Hemoglobin Concentration : 33.2 gm/dL  Auto Neutrophil # : 14.19 K/uL  Auto Lymphocyte # : 1.45 K/uL  Auto Monocyte # : 0.72 K/uL  Auto Eosinophil # : 0.28 K/uL  Auto Basophil # : 0.00 K/uL  Auto Neutrophil % : 78.3 %  Auto Lymphocyte % : 8.7 %  Auto Monocyte % : 4.3 %  Auto Eosinophil % : 1.7 %  Auto Basophil % : 0.0 %    03-20    149<H>  |  107  |  18  ----------------------------<  120<H>  3.6   |  23  |  1.44<H>    Ca    8.3<L>      20 Mar 2023 21:02    TPro  4.6<L>  /  Alb  2.7<L>  /  TBili  1.0  /  DBili  x   /  AST  72<H>  /  ALT  21  /  AlkPhos  64  03-20    PT/INR - ( 20 Mar 2023 21:02 )   PT: 13.5 sec;   INR: 1.13          PTT - ( 20 Mar 2023 21:02 )  PTT:40.7 sec  The current medications were reviewed   MEDICATIONS  (STANDING):  chlorhexidine 0.12% Liquid 5 milliLiter(s) Oral Mucosa two times a day  chlorhexidine 0.12% Liquid 15 milliLiter(s) Oral Mucosa every 12 hours  chlorhexidine 2% Cloths 1 Application(s) Topical daily  dextrose 50% Injectable 50 milliLiter(s) IV Push every 15 minutes  dextrose 50% Injectable 25 milliLiter(s) IV Push every 15 minutes  heparin   Injectable 5000 Unit(s) SubCutaneous every 8 hours  insulin regular Infusion 1 Unit(s)/Hr (1 mL/Hr) IV Continuous <Continuous>  meperidine     Injectable 25 milliGRAM(s) IV Push once  pantoprazole  Injectable 40 milliGRAM(s) IV Push daily  propofol Infusion 5 MICROgram(s)/kG/Min (2.31 mL/Hr) IV Continuous <Continuous>  sodium chloride 0.9%. 1000 milliLiter(s) (10 mL/Hr) IV Continuous <Continuous>    MEDICATIONS  (PRN):            53 year old  Male with expanding aortic arch and descending aortic aneurysm   S/P AVR  Aortic arch replacement  S/P CABG (SVG to RCA).  S/P Aorto left axillary by pass.  S/P TEVAR to descending aorta.  Intra operative Infusions - Crystalloids (5000cc LR. IV Infusions)  - Blood Products (unit/s)	7units PRBC, 6unit FFP, 3unit platelet, 15unitc cryo, 1000cc fiebnacho  Arrived on high doses of epinephrine and norepinephrine.  Hemodynamically stable.  Good oxygenation.  Fair urine out put.      My plan includes :  Continue full vent support.  D/C epinephrine (lactic acidosis).  WEAN norepinephrine.  Continue milrinone and inhaled nitric oxide for the right heart support.  Statin Rx.  D/C lactated ringer infusions.   Close hemodynamic, ventilatory and drain monitoring and management  Monitor for arrhythmias and monitor parameters for organ perfusion  Monitor neurologic status  Monitor renal function.  Head of the bed should remain elevated to 45 deg .   Chest PT and IS will be encouraged  Monitor adequacy of oxygenation and ventilation and attempt to wean oxygen  Nutritional goals will be met using po eventually , ensure adequate caloric intake and monitor the same  Stress ulcer and VTE prophylaxis will be achieved    Glycemic control is satisfactory  Electrolytes have been repleted as necessary and wound care has been carried out. Pain control has been achieved.   Aggressive physical therapy and early mobility and ambulation goals will be met   The family was updated about the course and plan  CRITICAL CARE TIME SPENT in evaluation and management, reassessments, review and interpretation of labs and x-rays, ventilator and hemodynamic management, formulating a plan and coordinating care: ___120____ MIN.  Time does not include procedural time.  CTICU ATTENDING     					    José Miguel Torres MD

## 2023-03-20 NOTE — BRIEF OPERATIVE NOTE - NSICDXBRIEFPOSTOP_GEN_ALL_CORE_FT
POST-OP DIAGNOSIS:  Thoracic aortic dissection 20-Mar-2023 21:04:27 chronic Nely Laguna  Aneurysm of aortic arch without rupture 20-Mar-2023 21:05:46  Nely Laguna

## 2023-03-21 LAB
ALBUMIN SERPL ELPH-MCNC: 1.8 G/DL — LOW (ref 3.3–5)
ALBUMIN SERPL ELPH-MCNC: 2 G/DL — LOW (ref 3.3–5)
ALBUMIN SERPL ELPH-MCNC: 2.2 G/DL — LOW (ref 3.3–5)
ALBUMIN SERPL ELPH-MCNC: 2.2 G/DL — LOW (ref 3.3–5)
ALBUMIN SERPL ELPH-MCNC: 2.3 G/DL — LOW (ref 3.3–5)
ALP SERPL-CCNC: 32 U/L — LOW (ref 40–120)
ALP SERPL-CCNC: 40 U/L — SIGNIFICANT CHANGE UP (ref 40–120)
ALP SERPL-CCNC: 42 U/L — SIGNIFICANT CHANGE UP (ref 40–120)
ALP SERPL-CCNC: 42 U/L — SIGNIFICANT CHANGE UP (ref 40–120)
ALP SERPL-CCNC: 43 U/L — SIGNIFICANT CHANGE UP (ref 40–120)
ALP SERPL-CCNC: 44 U/L — SIGNIFICANT CHANGE UP (ref 40–120)
ALP SERPL-CCNC: 45 U/L — SIGNIFICANT CHANGE UP (ref 40–120)
ALT FLD-CCNC: 18 U/L — SIGNIFICANT CHANGE UP (ref 10–45)
ALT FLD-CCNC: 21 U/L — SIGNIFICANT CHANGE UP (ref 10–45)
ALT FLD-CCNC: 22 U/L — SIGNIFICANT CHANGE UP (ref 10–45)
ALT FLD-CCNC: 30 U/L — SIGNIFICANT CHANGE UP (ref 10–45)
ALT FLD-CCNC: 70 U/L — HIGH (ref 10–45)
ALT FLD-CCNC: 73 U/L — HIGH (ref 10–45)
ALT FLD-CCNC: 79 U/L — HIGH (ref 10–45)
ANION GAP SERPL CALC-SCNC: 10 MMOL/L — SIGNIFICANT CHANGE UP (ref 5–17)
ANION GAP SERPL CALC-SCNC: 12 MMOL/L — SIGNIFICANT CHANGE UP (ref 5–17)
ANION GAP SERPL CALC-SCNC: 13 MMOL/L — SIGNIFICANT CHANGE UP (ref 5–17)
ANION GAP SERPL CALC-SCNC: 18 MMOL/L — HIGH (ref 5–17)
ANION GAP SERPL CALC-SCNC: 20 MMOL/L — HIGH (ref 5–17)
ANION GAP SERPL CALC-SCNC: 24 MMOL/L — HIGH (ref 5–17)
ANION GAP SERPL CALC-SCNC: 27 MMOL/L — HIGH (ref 5–17)
ANISOCYTOSIS BLD QL: SLIGHT — SIGNIFICANT CHANGE UP
APPEARANCE UR: CLEAR — SIGNIFICANT CHANGE UP
APTT BLD: 34.7 SEC — SIGNIFICANT CHANGE UP (ref 27.5–35.5)
APTT BLD: 35.9 SEC — HIGH (ref 27.5–35.5)
APTT BLD: 38.1 SEC — HIGH (ref 27.5–35.5)
APTT BLD: 38.9 SEC — HIGH (ref 27.5–35.5)
APTT BLD: 39.5 SEC — HIGH (ref 27.5–35.5)
APTT BLD: 40.1 SEC — HIGH (ref 27.5–35.5)
APTT BLD: 41.1 SEC — HIGH (ref 27.5–35.5)
AST SERPL-CCNC: 103 U/L — HIGH (ref 10–40)
AST SERPL-CCNC: 112 U/L — HIGH (ref 10–40)
AST SERPL-CCNC: 127 U/L — HIGH (ref 10–40)
AST SERPL-CCNC: 310 U/L — HIGH (ref 10–40)
AST SERPL-CCNC: 333 U/L — HIGH (ref 10–40)
AST SERPL-CCNC: 393 U/L — HIGH (ref 10–40)
AST SERPL-CCNC: 75 U/L — HIGH (ref 10–40)
B-OH-BUTYR SERPL-SCNC: 0.2 MMOL/L — SIGNIFICANT CHANGE UP
BACTERIA # UR AUTO: PRESENT /HPF
BASE EXCESS BLDA CALC-SCNC: -12.3 MMOL/L — LOW (ref -2–3)
BASE EXCESS BLDV CALC-SCNC: -0.6 MMOL/L — SIGNIFICANT CHANGE UP (ref -2–3)
BASE EXCESS BLDV CALC-SCNC: -1.5 MMOL/L — SIGNIFICANT CHANGE UP (ref -2–3)
BASE EXCESS BLDV CALC-SCNC: -13 MMOL/L — LOW (ref -2–3)
BASE EXCESS BLDV CALC-SCNC: -15 MMOL/L — LOW (ref -2–3)
BASE EXCESS BLDV CALC-SCNC: -7.9 MMOL/L — LOW (ref -2–3)
BASE EXCESS BLDV CALC-SCNC: -8.3 MMOL/L — LOW (ref -2–3)
BASE EXCESS BLDV CALC-SCNC: -8.4 MMOL/L — LOW (ref -2–3)
BASE EXCESS BLDV CALC-SCNC: 0.1 MMOL/L — SIGNIFICANT CHANGE UP (ref -2–3)
BASOPHILS # BLD AUTO: 0 K/UL — SIGNIFICANT CHANGE UP (ref 0–0.2)
BASOPHILS # BLD AUTO: 0.03 K/UL — SIGNIFICANT CHANGE UP (ref 0–0.2)
BASOPHILS # BLD AUTO: 0.03 K/UL — SIGNIFICANT CHANGE UP (ref 0–0.2)
BASOPHILS NFR BLD AUTO: 0 % — SIGNIFICANT CHANGE UP (ref 0–2)
BASOPHILS NFR BLD AUTO: 0.2 % — SIGNIFICANT CHANGE UP (ref 0–2)
BASOPHILS NFR BLD AUTO: 0.3 % — SIGNIFICANT CHANGE UP (ref 0–2)
BILIRUB SERPL-MCNC: 0.3 MG/DL — SIGNIFICANT CHANGE UP (ref 0.2–1.2)
BILIRUB SERPL-MCNC: 0.4 MG/DL — SIGNIFICANT CHANGE UP (ref 0.2–1.2)
BILIRUB SERPL-MCNC: 0.4 MG/DL — SIGNIFICANT CHANGE UP (ref 0.2–1.2)
BILIRUB SERPL-MCNC: 0.5 MG/DL — SIGNIFICANT CHANGE UP (ref 0.2–1.2)
BILIRUB SERPL-MCNC: 0.5 MG/DL — SIGNIFICANT CHANGE UP (ref 0.2–1.2)
BILIRUB SERPL-MCNC: 0.6 MG/DL — SIGNIFICANT CHANGE UP (ref 0.2–1.2)
BILIRUB SERPL-MCNC: 0.8 MG/DL — SIGNIFICANT CHANGE UP (ref 0.2–1.2)
BILIRUB UR-MCNC: NEGATIVE — SIGNIFICANT CHANGE UP
BUN SERPL-MCNC: 19 MG/DL — SIGNIFICANT CHANGE UP (ref 7–23)
BUN SERPL-MCNC: 19 MG/DL — SIGNIFICANT CHANGE UP (ref 7–23)
BUN SERPL-MCNC: 21 MG/DL — SIGNIFICANT CHANGE UP (ref 7–23)
BUN SERPL-MCNC: 23 MG/DL — SIGNIFICANT CHANGE UP (ref 7–23)
BUN SERPL-MCNC: 24 MG/DL — HIGH (ref 7–23)
BURR CELLS BLD QL SMEAR: PRESENT — SIGNIFICANT CHANGE UP
CA-I SERPL-SCNC: 1.15 MMOL/L — SIGNIFICANT CHANGE UP (ref 1.15–1.33)
CA-I SERPL-SCNC: 1.16 MMOL/L — SIGNIFICANT CHANGE UP (ref 1.15–1.33)
CA-I SERPL-SCNC: 1.18 MMOL/L — SIGNIFICANT CHANGE UP (ref 1.15–1.33)
CALCIUM SERPL-MCNC: 7.3 MG/DL — LOW (ref 8.4–10.5)
CALCIUM SERPL-MCNC: 7.4 MG/DL — LOW (ref 8.4–10.5)
CALCIUM SERPL-MCNC: 7.7 MG/DL — LOW (ref 8.4–10.5)
CALCIUM SERPL-MCNC: 7.8 MG/DL — LOW (ref 8.4–10.5)
CALCIUM SERPL-MCNC: 8.1 MG/DL — LOW (ref 8.4–10.5)
CALCIUM SERPL-MCNC: 8.2 MG/DL — LOW (ref 8.4–10.5)
CALCIUM SERPL-MCNC: 8.5 MG/DL — SIGNIFICANT CHANGE UP (ref 8.4–10.5)
CHLORIDE SERPL-SCNC: 107 MMOL/L — SIGNIFICANT CHANGE UP (ref 96–108)
CHLORIDE SERPL-SCNC: 109 MMOL/L — HIGH (ref 96–108)
CHLORIDE SERPL-SCNC: 110 MMOL/L — HIGH (ref 96–108)
CHLORIDE SERPL-SCNC: 111 MMOL/L — HIGH (ref 96–108)
CHLORIDE SERPL-SCNC: 111 MMOL/L — HIGH (ref 96–108)
CHLORIDE SERPL-SCNC: 112 MMOL/L — HIGH (ref 96–108)
CHLORIDE SERPL-SCNC: 114 MMOL/L — HIGH (ref 96–108)
CK SERPL-CCNC: 1566 U/L — HIGH (ref 30–200)
CO2 BLDA-SCNC: 16 MMOL/L — LOW (ref 19–24)
CO2 BLDV-SCNC: 14.9 MMOL/L — LOW (ref 22–26)
CO2 BLDV-SCNC: 16.3 MMOL/L — LOW (ref 22–26)
CO2 BLDV-SCNC: 18.3 MMOL/L — LOW (ref 22–26)
CO2 BLDV-SCNC: 20.1 MMOL/L — LOW (ref 22–26)
CO2 BLDV-SCNC: 20.3 MMOL/L — LOW (ref 22–26)
CO2 BLDV-SCNC: 26.2 MMOL/L — HIGH (ref 22–26)
CO2 BLDV-SCNC: 26.2 MMOL/L — HIGH (ref 22–26)
CO2 BLDV-SCNC: 27.4 MMOL/L — HIGH (ref 22–26)
CO2 SERPL-SCNC: 13 MMOL/L — LOW (ref 22–31)
CO2 SERPL-SCNC: 14 MMOL/L — LOW (ref 22–31)
CO2 SERPL-SCNC: 18 MMOL/L — LOW (ref 22–31)
CO2 SERPL-SCNC: 19 MMOL/L — LOW (ref 22–31)
CO2 SERPL-SCNC: 22 MMOL/L — SIGNIFICANT CHANGE UP (ref 22–31)
CO2 SERPL-SCNC: 23 MMOL/L — SIGNIFICANT CHANGE UP (ref 22–31)
CO2 SERPL-SCNC: 24 MMOL/L — SIGNIFICANT CHANGE UP (ref 22–31)
COLOR SPEC: YELLOW — SIGNIFICANT CHANGE UP
COMMENT - URINE: SIGNIFICANT CHANGE UP
CREAT SERPL-MCNC: 1.69 MG/DL — HIGH (ref 0.5–1.3)
CREAT SERPL-MCNC: 1.92 MG/DL — HIGH (ref 0.5–1.3)
CREAT SERPL-MCNC: 2 MG/DL — HIGH (ref 0.5–1.3)
CREAT SERPL-MCNC: 2.13 MG/DL — HIGH (ref 0.5–1.3)
CREAT SERPL-MCNC: 2.14 MG/DL — HIGH (ref 0.5–1.3)
CREAT SERPL-MCNC: 2.22 MG/DL — HIGH (ref 0.5–1.3)
CREAT SERPL-MCNC: 2.31 MG/DL — HIGH (ref 0.5–1.3)
DIFF PNL FLD: ABNORMAL
EGFR: 33 ML/MIN/1.73M2 — LOW
EGFR: 34 ML/MIN/1.73M2 — LOW
EGFR: 36 ML/MIN/1.73M2 — LOW
EGFR: 36 ML/MIN/1.73M2 — LOW
EGFR: 39 ML/MIN/1.73M2 — LOW
EGFR: 41 ML/MIN/1.73M2 — LOW
EGFR: 48 ML/MIN/1.73M2 — LOW
EOSINOPHIL # BLD AUTO: 0 K/UL — SIGNIFICANT CHANGE UP (ref 0–0.5)
EOSINOPHIL # BLD AUTO: 0.01 K/UL — SIGNIFICANT CHANGE UP (ref 0–0.5)
EOSINOPHIL # BLD AUTO: 0.01 K/UL — SIGNIFICANT CHANGE UP (ref 0–0.5)
EOSINOPHIL NFR BLD AUTO: 0 % — SIGNIFICANT CHANGE UP (ref 0–6)
EOSINOPHIL NFR BLD AUTO: 0.1 % — SIGNIFICANT CHANGE UP (ref 0–6)
EOSINOPHIL NFR BLD AUTO: 0.1 % — SIGNIFICANT CHANGE UP (ref 0–6)
EPI CELLS # UR: SIGNIFICANT CHANGE UP /HPF (ref 0–5)
GAS PNL BLDA: SIGNIFICANT CHANGE UP
GAS PNL BLDV: 143 MMOL/L — SIGNIFICANT CHANGE UP (ref 136–145)
GAS PNL BLDV: 145 MMOL/L — SIGNIFICANT CHANGE UP (ref 136–145)
GAS PNL BLDV: 145 MMOL/L — SIGNIFICANT CHANGE UP (ref 136–145)
GAS PNL BLDV: SIGNIFICANT CHANGE UP
GAS PNL BLDV: SIGNIFICANT CHANGE UP
GLUCOSE BLDC GLUCOMTR-MCNC: 137 MG/DL — HIGH (ref 70–99)
GLUCOSE BLDC GLUCOMTR-MCNC: 71 MG/DL — SIGNIFICANT CHANGE UP (ref 70–99)
GLUCOSE SERPL-MCNC: 113 MG/DL — HIGH (ref 70–99)
GLUCOSE SERPL-MCNC: 130 MG/DL — HIGH (ref 70–99)
GLUCOSE SERPL-MCNC: 137 MG/DL — HIGH (ref 70–99)
GLUCOSE SERPL-MCNC: 138 MG/DL — HIGH (ref 70–99)
GLUCOSE SERPL-MCNC: 143 MG/DL — HIGH (ref 70–99)
GLUCOSE SERPL-MCNC: 164 MG/DL — HIGH (ref 70–99)
GLUCOSE SERPL-MCNC: 83 MG/DL — SIGNIFICANT CHANGE UP (ref 70–99)
GLUCOSE UR QL: NEGATIVE — SIGNIFICANT CHANGE UP
GRAN CASTS # UR COMP ASSIST: ABNORMAL /LPF
HCO3 BLDA-SCNC: 15 MMOL/L — LOW (ref 21–28)
HCO3 BLDV-SCNC: 14 MMOL/L — LOW (ref 22–29)
HCO3 BLDV-SCNC: 15 MMOL/L — LOW (ref 22–29)
HCO3 BLDV-SCNC: 17 MMOL/L — LOW (ref 22–29)
HCO3 BLDV-SCNC: 19 MMOL/L — LOW (ref 22–29)
HCO3 BLDV-SCNC: 19 MMOL/L — LOW (ref 22–29)
HCO3 BLDV-SCNC: 25 MMOL/L — SIGNIFICANT CHANGE UP (ref 22–29)
HCO3 BLDV-SCNC: 25 MMOL/L — SIGNIFICANT CHANGE UP (ref 22–29)
HCO3 BLDV-SCNC: 26 MMOL/L — SIGNIFICANT CHANGE UP (ref 22–29)
HCT VFR BLD CALC: 23.5 % — LOW (ref 39–50)
HCT VFR BLD CALC: 26.8 % — LOW (ref 39–50)
HCT VFR BLD CALC: 27 % — LOW (ref 39–50)
HCT VFR BLD CALC: 28.2 % — LOW (ref 39–50)
HCT VFR BLD CALC: 30 % — LOW (ref 39–50)
HCT VFR BLD CALC: 32.8 % — LOW (ref 39–50)
HCT VFR BLD CALC: 34 % — LOW (ref 39–50)
HGB BLD-MCNC: 10.8 G/DL — LOW (ref 13–17)
HGB BLD-MCNC: 11.7 G/DL — LOW (ref 13–17)
HGB BLD-MCNC: 12.1 G/DL — LOW (ref 13–17)
HGB BLD-MCNC: 8 G/DL — LOW (ref 13–17)
HGB BLD-MCNC: 8.8 G/DL — LOW (ref 13–17)
HGB BLD-MCNC: 9.2 G/DL — LOW (ref 13–17)
HGB BLD-MCNC: 9.3 G/DL — LOW (ref 13–17)
IMM GRANULOCYTES NFR BLD AUTO: 1.9 % — HIGH (ref 0–0.9)
IMM GRANULOCYTES NFR BLD AUTO: 2.7 % — HIGH (ref 0–0.9)
INR BLD: 1.21 — HIGH (ref 0.88–1.16)
INR BLD: 1.22 — HIGH (ref 0.88–1.16)
INR BLD: 1.22 — HIGH (ref 0.88–1.16)
INR BLD: 1.23 — HIGH (ref 0.88–1.16)
INR BLD: 1.24 — HIGH (ref 0.88–1.16)
INR BLD: 1.28 — HIGH (ref 0.88–1.16)
INR BLD: 1.39 — HIGH (ref 0.88–1.16)
KETONES UR-MCNC: NEGATIVE — SIGNIFICANT CHANGE UP
LACTATE SERPL-SCNC: 10.1 MMOL/L — CRITICAL HIGH (ref 0.5–2)
LACTATE SERPL-SCNC: 13.2 MMOL/L — CRITICAL HIGH (ref 0.5–2)
LACTATE SERPL-SCNC: 13.6 MMOL/L — CRITICAL HIGH (ref 0.5–2)
LACTATE SERPL-SCNC: 14.1 MMOL/L — CRITICAL HIGH (ref 0.5–2)
LACTATE SERPL-SCNC: 3.3 MMOL/L — HIGH (ref 0.5–2)
LACTATE SERPL-SCNC: 4 MMOL/L — CRITICAL HIGH (ref 0.5–2)
LACTATE SERPL-SCNC: 4.3 MMOL/L — CRITICAL HIGH (ref 0.5–2)
LACTATE SERPL-SCNC: 9.2 MMOL/L — CRITICAL HIGH (ref 0.5–2)
LEUKOCYTE ESTERASE UR-ACNC: NEGATIVE — SIGNIFICANT CHANGE UP
LYMPHOCYTES # BLD AUTO: 0.95 K/UL — LOW (ref 1–3.3)
LYMPHOCYTES # BLD AUTO: 0.99 K/UL — LOW (ref 1–3.3)
LYMPHOCYTES # BLD AUTO: 1.18 K/UL — SIGNIFICANT CHANGE UP (ref 1–3.3)
LYMPHOCYTES # BLD AUTO: 6.8 % — LOW (ref 13–44)
LYMPHOCYTES # BLD AUTO: 9 % — LOW (ref 13–44)
LYMPHOCYTES # BLD AUTO: 9.4 % — LOW (ref 13–44)
MAGNESIUM SERPL-MCNC: 2.2 MG/DL — SIGNIFICANT CHANGE UP (ref 1.6–2.6)
MAGNESIUM SERPL-MCNC: 2.3 MG/DL — SIGNIFICANT CHANGE UP (ref 1.6–2.6)
MAGNESIUM SERPL-MCNC: 2.5 MG/DL — SIGNIFICANT CHANGE UP (ref 1.6–2.6)
MAGNESIUM SERPL-MCNC: 2.5 MG/DL — SIGNIFICANT CHANGE UP (ref 1.6–2.6)
MAGNESIUM SERPL-MCNC: 2.9 MG/DL — HIGH (ref 1.6–2.6)
MAGNESIUM SERPL-MCNC: 3.2 MG/DL — HIGH (ref 1.6–2.6)
MANUAL SMEAR VERIFICATION: SIGNIFICANT CHANGE UP
MCHC RBC-ENTMCNC: 30.5 PG — SIGNIFICANT CHANGE UP (ref 27–34)
MCHC RBC-ENTMCNC: 30.7 PG — SIGNIFICANT CHANGE UP (ref 27–34)
MCHC RBC-ENTMCNC: 30.8 PG — SIGNIFICANT CHANGE UP (ref 27–34)
MCHC RBC-ENTMCNC: 30.9 PG — SIGNIFICANT CHANGE UP (ref 27–34)
MCHC RBC-ENTMCNC: 30.9 PG — SIGNIFICANT CHANGE UP (ref 27–34)
MCHC RBC-ENTMCNC: 31 PG — SIGNIFICANT CHANGE UP (ref 27–34)
MCHC RBC-ENTMCNC: 31.6 PG — SIGNIFICANT CHANGE UP (ref 27–34)
MCHC RBC-ENTMCNC: 32.8 GM/DL — SIGNIFICANT CHANGE UP (ref 32–36)
MCHC RBC-ENTMCNC: 33 GM/DL — SIGNIFICANT CHANGE UP (ref 32–36)
MCHC RBC-ENTMCNC: 34 GM/DL — SIGNIFICANT CHANGE UP (ref 32–36)
MCHC RBC-ENTMCNC: 34.1 GM/DL — SIGNIFICANT CHANGE UP (ref 32–36)
MCHC RBC-ENTMCNC: 35.6 GM/DL — SIGNIFICANT CHANGE UP (ref 32–36)
MCHC RBC-ENTMCNC: 35.7 GM/DL — SIGNIFICANT CHANGE UP (ref 32–36)
MCHC RBC-ENTMCNC: 36 GM/DL — SIGNIFICANT CHANGE UP (ref 32–36)
MCV RBC AUTO: 86 FL — SIGNIFICANT CHANGE UP (ref 80–100)
MCV RBC AUTO: 86.3 FL — SIGNIFICANT CHANGE UP (ref 80–100)
MCV RBC AUTO: 86.8 FL — SIGNIFICANT CHANGE UP (ref 80–100)
MCV RBC AUTO: 89.7 FL — SIGNIFICANT CHANGE UP (ref 80–100)
MCV RBC AUTO: 92.8 FL — SIGNIFICANT CHANGE UP (ref 80–100)
MCV RBC AUTO: 93.4 FL — SIGNIFICANT CHANGE UP (ref 80–100)
MCV RBC AUTO: 94 FL — SIGNIFICANT CHANGE UP (ref 80–100)
METAMYELOCYTES # FLD: 2.5 % — HIGH (ref 0–0)
MONOCYTES # BLD AUTO: 0.74 K/UL — SIGNIFICANT CHANGE UP (ref 0–0.9)
MONOCYTES # BLD AUTO: 1.05 K/UL — HIGH (ref 0–0.9)
MONOCYTES # BLD AUTO: 1.46 K/UL — HIGH (ref 0–0.9)
MONOCYTES NFR BLD AUTO: 11.7 % — SIGNIFICANT CHANGE UP (ref 2–14)
MONOCYTES NFR BLD AUTO: 5.1 % — SIGNIFICANT CHANGE UP (ref 2–14)
MONOCYTES NFR BLD AUTO: 9.9 % — SIGNIFICANT CHANGE UP (ref 2–14)
MYELOCYTES NFR BLD: 1.7 % — HIGH (ref 0–0)
NEUTROPHILS # BLD AUTO: 12.22 K/UL — HIGH (ref 1.8–7.4)
NEUTROPHILS # BLD AUTO: 8.26 K/UL — HIGH (ref 1.8–7.4)
NEUTROPHILS # BLD AUTO: 9.57 K/UL — HIGH (ref 1.8–7.4)
NEUTROPHILS NFR BLD AUTO: 76.7 % — SIGNIFICANT CHANGE UP (ref 43–77)
NEUTROPHILS NFR BLD AUTO: 78 % — HIGH (ref 43–77)
NEUTROPHILS NFR BLD AUTO: 78.8 % — HIGH (ref 43–77)
NEUTS BAND # BLD: 5.1 % — SIGNIFICANT CHANGE UP (ref 0–8)
NIGHT BLUE STAIN TISS: SIGNIFICANT CHANGE UP
NITRITE UR-MCNC: NEGATIVE — SIGNIFICANT CHANGE UP
NRBC # BLD: 0 /100 WBCS — SIGNIFICANT CHANGE UP (ref 0–0)
NRBC # BLD: 1 /100 — HIGH (ref 0–0)
NRBC # BLD: SIGNIFICANT CHANGE UP /100 WBCS (ref 0–0)
OVALOCYTES BLD QL SMEAR: SLIGHT — SIGNIFICANT CHANGE UP
PCO2 BLDA: 37 MMHG — SIGNIFICANT CHANGE UP (ref 35–48)
PCO2 BLDV: 35 MMHG — LOW (ref 42–55)
PCO2 BLDV: 41 MMHG — LOW (ref 42–55)
PCO2 BLDV: 41 MMHG — LOW (ref 42–55)
PCO2 BLDV: 42 MMHG — SIGNIFICANT CHANGE UP (ref 42–55)
PCO2 BLDV: 43 MMHG — SIGNIFICANT CHANGE UP (ref 42–55)
PCO2 BLDV: 44 MMHG — SIGNIFICANT CHANGE UP (ref 42–55)
PCO2 BLDV: 46 MMHG — SIGNIFICANT CHANGE UP (ref 42–55)
PCO2 BLDV: 47 MMHG — SIGNIFICANT CHANGE UP (ref 42–55)
PH BLDA: 7.21 — LOW (ref 7.35–7.45)
PH BLDV: 7.13 — CRITICAL LOW (ref 7.32–7.43)
PH BLDV: 7.17 — CRITICAL LOW (ref 7.32–7.43)
PH BLDV: 7.24 — LOW (ref 7.32–7.43)
PH BLDV: 7.26 — LOW (ref 7.32–7.43)
PH BLDV: 7.3 — LOW (ref 7.32–7.43)
PH BLDV: 7.33 — SIGNIFICANT CHANGE UP (ref 7.32–7.43)
PH BLDV: 7.36 — SIGNIFICANT CHANGE UP (ref 7.32–7.43)
PH BLDV: 7.37 — SIGNIFICANT CHANGE UP (ref 7.32–7.43)
PH UR: 6 — SIGNIFICANT CHANGE UP (ref 5–8)
PHOSPHATE SERPL-MCNC: 0.5 MG/DL — CRITICAL LOW (ref 2.5–4.5)
PHOSPHATE SERPL-MCNC: 3.9 MG/DL — SIGNIFICANT CHANGE UP (ref 2.5–4.5)
PHOSPHATE SERPL-MCNC: 3.9 MG/DL — SIGNIFICANT CHANGE UP (ref 2.5–4.5)
PHOSPHATE SERPL-MCNC: 4.4 MG/DL — SIGNIFICANT CHANGE UP (ref 2.5–4.5)
PHOSPHATE SERPL-MCNC: 4.5 MG/DL — SIGNIFICANT CHANGE UP (ref 2.5–4.5)
PHOSPHATE SERPL-MCNC: 4.9 MG/DL — HIGH (ref 2.5–4.5)
PLAT MORPH BLD: NORMAL — SIGNIFICANT CHANGE UP
PLATELET # BLD AUTO: 103 K/UL — LOW (ref 150–400)
PLATELET # BLD AUTO: 127 K/UL — LOW (ref 150–400)
PLATELET # BLD AUTO: 145 K/UL — LOW (ref 150–400)
PLATELET # BLD AUTO: 145 K/UL — LOW (ref 150–400)
PLATELET # BLD AUTO: 59 K/UL — LOW (ref 150–400)
PLATELET # BLD AUTO: 74 K/UL — LOW (ref 150–400)
PLATELET # BLD AUTO: 79 K/UL — LOW (ref 150–400)
PO2 BLDA: 97 MMHG — SIGNIFICANT CHANGE UP (ref 83–108)
PO2 BLDV: 163 MMHG — HIGH (ref 25–45)
PO2 BLDV: 34 MMHG — SIGNIFICANT CHANGE UP (ref 25–45)
PO2 BLDV: 36 MMHG — SIGNIFICANT CHANGE UP (ref 25–45)
PO2 BLDV: 39 MMHG — SIGNIFICANT CHANGE UP (ref 25–45)
PO2 BLDV: 40 MMHG — SIGNIFICANT CHANGE UP (ref 25–45)
PO2 BLDV: 48 MMHG — HIGH (ref 25–45)
PO2 BLDV: 50 MMHG — HIGH (ref 25–45)
PO2 BLDV: 51 MMHG — HIGH (ref 25–45)
POIKILOCYTOSIS BLD QL AUTO: SLIGHT — SIGNIFICANT CHANGE UP
POTASSIUM BLDV-SCNC: 3.4 MMOL/L — LOW (ref 3.5–5.1)
POTASSIUM BLDV-SCNC: 4.4 MMOL/L — SIGNIFICANT CHANGE UP (ref 3.5–5.1)
POTASSIUM BLDV-SCNC: 4.6 MMOL/L — SIGNIFICANT CHANGE UP (ref 3.5–5.1)
POTASSIUM SERPL-MCNC: 3.7 MMOL/L — SIGNIFICANT CHANGE UP (ref 3.5–5.3)
POTASSIUM SERPL-MCNC: 4.4 MMOL/L — SIGNIFICANT CHANGE UP (ref 3.5–5.3)
POTASSIUM SERPL-MCNC: 4.8 MMOL/L — SIGNIFICANT CHANGE UP (ref 3.5–5.3)
POTASSIUM SERPL-MCNC: 5 MMOL/L — SIGNIFICANT CHANGE UP (ref 3.5–5.3)
POTASSIUM SERPL-MCNC: 5.1 MMOL/L — SIGNIFICANT CHANGE UP (ref 3.5–5.3)
POTASSIUM SERPL-MCNC: 5.6 MMOL/L — HIGH (ref 3.5–5.3)
POTASSIUM SERPL-MCNC: 6.5 MMOL/L — CRITICAL HIGH (ref 3.5–5.3)
POTASSIUM SERPL-SCNC: 3.7 MMOL/L — SIGNIFICANT CHANGE UP (ref 3.5–5.3)
POTASSIUM SERPL-SCNC: 4.4 MMOL/L — SIGNIFICANT CHANGE UP (ref 3.5–5.3)
POTASSIUM SERPL-SCNC: 4.8 MMOL/L — SIGNIFICANT CHANGE UP (ref 3.5–5.3)
POTASSIUM SERPL-SCNC: 5 MMOL/L — SIGNIFICANT CHANGE UP (ref 3.5–5.3)
POTASSIUM SERPL-SCNC: 5.1 MMOL/L — SIGNIFICANT CHANGE UP (ref 3.5–5.3)
POTASSIUM SERPL-SCNC: 5.6 MMOL/L — HIGH (ref 3.5–5.3)
POTASSIUM SERPL-SCNC: 6.5 MMOL/L — CRITICAL HIGH (ref 3.5–5.3)
PROT SERPL-MCNC: 3.3 G/DL — LOW (ref 6–8.3)
PROT SERPL-MCNC: 3.7 G/DL — LOW (ref 6–8.3)
PROT SERPL-MCNC: 4 G/DL — LOW (ref 6–8.3)
PROT SERPL-MCNC: 4 G/DL — LOW (ref 6–8.3)
PROT SERPL-MCNC: 4.1 G/DL — LOW (ref 6–8.3)
PROT SERPL-MCNC: 4.2 G/DL — LOW (ref 6–8.3)
PROT SERPL-MCNC: 4.2 G/DL — LOW (ref 6–8.3)
PROT UR-MCNC: ABNORMAL MG/DL
PROTHROM AB SERPL-ACNC: 14.4 SEC — HIGH (ref 10.5–13.4)
PROTHROM AB SERPL-ACNC: 14.5 SEC — HIGH (ref 10.5–13.4)
PROTHROM AB SERPL-ACNC: 14.5 SEC — HIGH (ref 10.5–13.4)
PROTHROM AB SERPL-ACNC: 14.7 SEC — HIGH (ref 10.5–13.4)
PROTHROM AB SERPL-ACNC: 14.8 SEC — HIGH (ref 10.5–13.4)
PROTHROM AB SERPL-ACNC: 15.3 SEC — HIGH (ref 10.5–13.4)
PROTHROM AB SERPL-ACNC: 16.6 SEC — HIGH (ref 10.5–13.4)
RBC # BLD: 2.62 M/UL — LOW (ref 4.2–5.8)
RBC # BLD: 2.85 M/UL — LOW (ref 4.2–5.8)
RBC # BLD: 2.91 M/UL — LOW (ref 4.2–5.8)
RBC # BLD: 3.02 M/UL — LOW (ref 4.2–5.8)
RBC # BLD: 3.49 M/UL — LOW (ref 4.2–5.8)
RBC # BLD: 3.78 M/UL — LOW (ref 4.2–5.8)
RBC # BLD: 3.94 M/UL — LOW (ref 4.2–5.8)
RBC # FLD: 15.8 % — HIGH (ref 10.3–14.5)
RBC # FLD: 15.9 % — HIGH (ref 10.3–14.5)
RBC # FLD: 16.1 % — HIGH (ref 10.3–14.5)
RBC # FLD: 16.4 % — HIGH (ref 10.3–14.5)
RBC # FLD: 16.6 % — HIGH (ref 10.3–14.5)
RBC # FLD: 17 % — HIGH (ref 10.3–14.5)
RBC # FLD: 17.2 % — HIGH (ref 10.3–14.5)
RBC BLD AUTO: ABNORMAL
RBC CASTS # UR COMP ASSIST: < 5 /HPF — SIGNIFICANT CHANGE UP
SAO2 % BLDA: 100 % — HIGH (ref 94–98)
SAO2 % BLDV: 100 % — HIGH (ref 67–88)
SAO2 % BLDV: 62.3 % — LOW (ref 67–88)
SAO2 % BLDV: 63.2 % — LOW (ref 67–88)
SAO2 % BLDV: 64.1 % — LOW (ref 67–88)
SAO2 % BLDV: 69.2 % — SIGNIFICANT CHANGE UP (ref 67–88)
SAO2 % BLDV: 77.7 % — SIGNIFICANT CHANGE UP (ref 67–88)
SAO2 % BLDV: 78.6 % — SIGNIFICANT CHANGE UP (ref 67–88)
SAO2 % BLDV: 82.7 % — SIGNIFICANT CHANGE UP (ref 67–88)
SCHISTOCYTES BLD QL AUTO: SLIGHT — SIGNIFICANT CHANGE UP
SODIUM SERPL-SCNC: 143 MMOL/L — SIGNIFICANT CHANGE UP (ref 135–145)
SODIUM SERPL-SCNC: 143 MMOL/L — SIGNIFICANT CHANGE UP (ref 135–145)
SODIUM SERPL-SCNC: 148 MMOL/L — HIGH (ref 135–145)
SODIUM SERPL-SCNC: 150 MMOL/L — HIGH (ref 135–145)
SODIUM SERPL-SCNC: 151 MMOL/L — HIGH (ref 135–145)
SP GR SPEC: 1.02 — SIGNIFICANT CHANGE UP (ref 1–1.03)
SPECIMEN SOURCE: SIGNIFICANT CHANGE UP
UROBILINOGEN FLD QL: 0.2 E.U./DL — SIGNIFICANT CHANGE UP
VALPROATE SERPL-MCNC: 38.9 UG/ML — LOW (ref 50–100)
WBC # BLD: 10.25 K/UL — SIGNIFICANT CHANGE UP (ref 3.8–10.5)
WBC # BLD: 10.59 K/UL — HIGH (ref 3.8–10.5)
WBC # BLD: 11.13 K/UL — HIGH (ref 3.8–10.5)
WBC # BLD: 12.49 K/UL — HIGH (ref 3.8–10.5)
WBC # BLD: 14.03 K/UL — HIGH (ref 3.8–10.5)
WBC # BLD: 14.57 K/UL — HIGH (ref 3.8–10.5)
WBC # BLD: 9.36 K/UL — SIGNIFICANT CHANGE UP (ref 3.8–10.5)
WBC # FLD AUTO: 10.25 K/UL — SIGNIFICANT CHANGE UP (ref 3.8–10.5)
WBC # FLD AUTO: 10.59 K/UL — HIGH (ref 3.8–10.5)
WBC # FLD AUTO: 11.13 K/UL — HIGH (ref 3.8–10.5)
WBC # FLD AUTO: 12.49 K/UL — HIGH (ref 3.8–10.5)
WBC # FLD AUTO: 14.03 K/UL — HIGH (ref 3.8–10.5)
WBC # FLD AUTO: 14.57 K/UL — HIGH (ref 3.8–10.5)
WBC # FLD AUTO: 9.36 K/UL — SIGNIFICANT CHANGE UP (ref 3.8–10.5)
WBC UR QL: ABNORMAL /HPF

## 2023-03-21 PROCEDURE — 93306 TTE W/DOPPLER COMPLETE: CPT | Mod: 26

## 2023-03-21 RX ORDER — SODIUM CHLORIDE 9 MG/ML
1000 INJECTION INTRAMUSCULAR; INTRAVENOUS; SUBCUTANEOUS
Refills: 0 | Status: COMPLETED | OUTPATIENT
Start: 2023-03-21 | End: 2023-03-21

## 2023-03-21 RX ORDER — FENTANYL CITRATE 50 UG/ML
75 INJECTION INTRAVENOUS ONCE
Refills: 0 | Status: DISCONTINUED | OUTPATIENT
Start: 2023-03-21 | End: 2023-03-21

## 2023-03-21 RX ORDER — FUROSEMIDE 40 MG
100 TABLET ORAL ONCE
Refills: 0 | Status: DISCONTINUED | OUTPATIENT
Start: 2023-03-21 | End: 2023-03-21

## 2023-03-21 RX ORDER — VALPROIC ACID (AS SODIUM SALT) 250 MG/5ML
250 SOLUTION, ORAL ORAL EVERY 12 HOURS
Refills: 0 | Status: DISCONTINUED | OUTPATIENT
Start: 2023-03-21 | End: 2023-03-21

## 2023-03-21 RX ORDER — SODIUM BICARBONATE 1 MEQ/ML
50 SYRINGE (ML) INTRAVENOUS
Refills: 0 | Status: COMPLETED | OUTPATIENT
Start: 2023-03-21 | End: 2023-03-21

## 2023-03-21 RX ORDER — ASPIRIN/CALCIUM CARB/MAGNESIUM 324 MG
81 TABLET ORAL DAILY
Refills: 0 | Status: DISCONTINUED | OUTPATIENT
Start: 2023-03-21 | End: 2023-03-27

## 2023-03-21 RX ORDER — CHLOROTHIAZIDE 500 MG
500 TABLET ORAL ONCE
Refills: 0 | Status: COMPLETED | OUTPATIENT
Start: 2023-03-21 | End: 2023-03-21

## 2023-03-21 RX ORDER — SODIUM BICARBONATE 1 MEQ/ML
50 SYRINGE (ML) INTRAVENOUS ONCE
Refills: 0 | Status: COMPLETED | OUTPATIENT
Start: 2023-03-21 | End: 2023-03-21

## 2023-03-21 RX ORDER — VASOPRESSIN 20 [USP'U]/ML
0.03 INJECTION INTRAVENOUS
Qty: 40 | Refills: 0 | Status: DISCONTINUED | OUTPATIENT
Start: 2023-03-21 | End: 2023-03-25

## 2023-03-21 RX ORDER — DEXTROSE 10 % IN WATER 10 %
250 INTRAVENOUS SOLUTION INTRAVENOUS
Refills: 0 | Status: DISCONTINUED | OUTPATIENT
Start: 2023-03-21 | End: 2023-03-25

## 2023-03-21 RX ORDER — SODIUM BICARBONATE 1 MEQ/ML
50 SYRINGE (ML) INTRAVENOUS ONCE
Refills: 0 | Status: DISCONTINUED | OUTPATIENT
Start: 2023-03-21 | End: 2023-03-21

## 2023-03-21 RX ORDER — CEFAZOLIN SODIUM 1 G
2000 VIAL (EA) INJECTION EVERY 12 HOURS
Refills: 0 | Status: COMPLETED | OUTPATIENT
Start: 2023-03-21 | End: 2023-03-22

## 2023-03-21 RX ORDER — DIVALPROEX SODIUM 500 MG/1
500 TABLET, DELAYED RELEASE ORAL ONCE
Refills: 0 | Status: COMPLETED | OUTPATIENT
Start: 2023-03-20 | End: 2023-03-21

## 2023-03-21 RX ORDER — HEPARIN SODIUM 5000 [USP'U]/ML
300 INJECTION INTRAVENOUS; SUBCUTANEOUS
Qty: 10000 | Refills: 0 | Status: DISCONTINUED | OUTPATIENT
Start: 2023-03-21 | End: 2023-03-21

## 2023-03-21 RX ORDER — SODIUM CHLORIDE 9 MG/ML
1000 INJECTION INTRAMUSCULAR; INTRAVENOUS; SUBCUTANEOUS ONCE
Refills: 0 | Status: COMPLETED | OUTPATIENT
Start: 2023-03-21 | End: 2023-03-21

## 2023-03-21 RX ORDER — FUROSEMIDE 40 MG
80 TABLET ORAL ONCE
Refills: 0 | Status: COMPLETED | OUTPATIENT
Start: 2023-03-21 | End: 2023-03-21

## 2023-03-21 RX ORDER — INSULIN HUMAN 100 [IU]/ML
12 INJECTION, SOLUTION SUBCUTANEOUS ONCE
Refills: 0 | Status: COMPLETED | OUTPATIENT
Start: 2023-03-21 | End: 2023-03-21

## 2023-03-21 RX ORDER — PHENYLEPHRINE HYDROCHLORIDE 10 MG/ML
0.5 INJECTION INTRAVENOUS
Qty: 160 | Refills: 0 | Status: DISCONTINUED | OUTPATIENT
Start: 2023-03-21 | End: 2023-03-21

## 2023-03-21 RX ORDER — VASOPRESSIN 20 [USP'U]/ML
0.03 INJECTION INTRAVENOUS
Qty: 40 | Refills: 0 | Status: DISCONTINUED | OUTPATIENT
Start: 2023-03-21 | End: 2023-03-21

## 2023-03-21 RX ORDER — IPRATROPIUM/ALBUTEROL SULFATE 18-103MCG
3 AEROSOL WITH ADAPTER (GRAM) INHALATION ONCE
Refills: 0 | Status: COMPLETED | OUTPATIENT
Start: 2023-03-21 | End: 2023-03-21

## 2023-03-21 RX ORDER — FUROSEMIDE 40 MG
20 TABLET ORAL ONCE
Refills: 0 | Status: COMPLETED | OUTPATIENT
Start: 2023-03-21 | End: 2023-03-21

## 2023-03-21 RX ORDER — CALCIUM GLUCONATE 100 MG/ML
2 VIAL (ML) INTRAVENOUS ONCE
Refills: 0 | Status: COMPLETED | OUTPATIENT
Start: 2023-03-21 | End: 2023-03-21

## 2023-03-21 RX ORDER — LACOSAMIDE 50 MG/1
100 TABLET ORAL EVERY 12 HOURS
Refills: 0 | Status: DISCONTINUED | OUTPATIENT
Start: 2023-03-21 | End: 2023-04-04

## 2023-03-21 RX ORDER — VALPROIC ACID (AS SODIUM SALT) 250 MG/5ML
1000 SOLUTION, ORAL ORAL ONCE
Refills: 0 | Status: COMPLETED | OUTPATIENT
Start: 2023-03-21 | End: 2023-03-22

## 2023-03-21 RX ORDER — VALPROIC ACID (AS SODIUM SALT) 250 MG/5ML
750 SOLUTION, ORAL ORAL EVERY 12 HOURS
Refills: 0 | Status: DISCONTINUED | OUTPATIENT
Start: 2023-03-21 | End: 2023-04-04

## 2023-03-21 RX ORDER — PHENYLEPHRINE HYDROCHLORIDE 10 MG/ML
0.5 INJECTION INTRAVENOUS
Qty: 40 | Refills: 0 | Status: DISCONTINUED | OUTPATIENT
Start: 2023-03-21 | End: 2023-03-21

## 2023-03-21 RX ORDER — FUROSEMIDE 40 MG
15 TABLET ORAL
Qty: 500 | Refills: 0 | Status: DISCONTINUED | OUTPATIENT
Start: 2023-03-21 | End: 2023-03-22

## 2023-03-21 RX ORDER — VALPROIC ACID (AS SODIUM SALT) 250 MG/5ML
500 SOLUTION, ORAL ORAL ONCE
Refills: 0 | Status: DISCONTINUED | OUTPATIENT
Start: 2023-03-21 | End: 2023-03-21

## 2023-03-21 RX ORDER — SODIUM BICARBONATE 1 MEQ/ML
0.15 SYRINGE (ML) INTRAVENOUS
Qty: 150 | Refills: 0 | Status: DISCONTINUED | OUTPATIENT
Start: 2023-03-21 | End: 2023-03-21

## 2023-03-21 RX ORDER — DEXTROSE 50 % IN WATER 50 %
25 SYRINGE (ML) INTRAVENOUS ONCE
Refills: 0 | Status: DISCONTINUED | OUTPATIENT
Start: 2023-03-21 | End: 2023-03-21

## 2023-03-21 RX ORDER — POTASSIUM PHOSPHATE, MONOBASIC POTASSIUM PHOSPHATE, DIBASIC 236; 224 MG/ML; MG/ML
30 INJECTION, SOLUTION INTRAVENOUS ONCE
Refills: 0 | Status: COMPLETED | OUTPATIENT
Start: 2023-03-21 | End: 2023-03-21

## 2023-03-21 RX ORDER — NOREPINEPHRINE BITARTRATE/D5W 8 MG/250ML
0.05 PLASTIC BAG, INJECTION (ML) INTRAVENOUS
Qty: 16 | Refills: 0 | Status: DISCONTINUED | OUTPATIENT
Start: 2023-03-21 | End: 2023-03-21

## 2023-03-21 RX ADMIN — Medication 50 MILLILITER(S): at 06:17

## 2023-03-21 RX ADMIN — FENTANYL CITRATE 75 MICROGRAM(S): 50 INJECTION INTRAVENOUS at 06:30

## 2023-03-21 RX ADMIN — Medication 50 MILLIEQUIVALENT(S): at 21:07

## 2023-03-21 RX ADMIN — Medication 2.5 MG/HR: at 01:42

## 2023-03-21 RX ADMIN — Medication 50 MILLIEQUIVALENT(S): at 03:41

## 2023-03-21 RX ADMIN — INSULIN HUMAN 12 UNIT(S): 100 INJECTION, SOLUTION SUBCUTANEOUS at 21:19

## 2023-03-21 RX ADMIN — Medication 20 MILLIGRAM(S): at 03:55

## 2023-03-21 RX ADMIN — POTASSIUM PHOSPHATE, MONOBASIC POTASSIUM PHOSPHATE, DIBASIC 83.33 MILLIMOLE(S): 236; 224 INJECTION, SOLUTION INTRAVENOUS at 04:24

## 2023-03-21 RX ADMIN — Medication 75 MEQ/KG/HR: at 05:08

## 2023-03-21 RX ADMIN — PROPOFOL 2.31 MICROGRAM(S)/KG/MIN: 10 INJECTION, EMULSION INTRAVENOUS at 03:23

## 2023-03-21 RX ADMIN — SODIUM CHLORIDE 2000 MILLILITER(S): 9 INJECTION INTRAMUSCULAR; INTRAVENOUS; SUBCUTANEOUS at 11:17

## 2023-03-21 RX ADMIN — Medication 80 MILLIGRAM(S): at 03:55

## 2023-03-21 RX ADMIN — MILRINONE LACTATE 5.78 MICROGRAM(S)/KG/MIN: 1 INJECTION, SOLUTION INTRAVENOUS at 05:10

## 2023-03-21 RX ADMIN — Medication 2 MILLIGRAM(S): at 13:22

## 2023-03-21 RX ADMIN — CHLORHEXIDINE GLUCONATE 1 APPLICATION(S): 213 SOLUTION TOPICAL at 11:16

## 2023-03-21 RX ADMIN — PHENYLEPHRINE HYDROCHLORIDE 14.5 MICROGRAM(S)/KG/MIN: 10 INJECTION INTRAVENOUS at 05:10

## 2023-03-21 RX ADMIN — Medication 3 MILLILITER(S): at 23:57

## 2023-03-21 RX ADMIN — SODIUM CHLORIDE 2000 MILLILITER(S): 9 INJECTION INTRAMUSCULAR; INTRAVENOUS; SUBCUTANEOUS at 11:18

## 2023-03-21 RX ADMIN — Medication 200 GRAM(S): at 22:17

## 2023-03-21 RX ADMIN — Medication 100 MILLIGRAM(S): at 17:08

## 2023-03-21 RX ADMIN — Medication 50 MILLILITER(S): at 06:02

## 2023-03-21 RX ADMIN — CHLORHEXIDINE GLUCONATE 15 MILLILITER(S): 213 SOLUTION TOPICAL at 17:09

## 2023-03-21 RX ADMIN — FENTANYL CITRATE 75 MICROGRAM(S): 50 INJECTION INTRAVENOUS at 05:03

## 2023-03-21 RX ADMIN — HEPARIN SODIUM 5000 UNIT(S): 5000 INJECTION INTRAVENOUS; SUBCUTANEOUS at 13:25

## 2023-03-21 RX ADMIN — Medication 200 GRAM(S): at 15:35

## 2023-03-21 RX ADMIN — LACOSAMIDE 100 MILLIGRAM(S): 50 TABLET ORAL at 06:23

## 2023-03-21 RX ADMIN — Medication 50 MILLIEQUIVALENT(S): at 03:44

## 2023-03-21 RX ADMIN — HEPARIN SODIUM 5000 UNIT(S): 5000 INJECTION INTRAVENOUS; SUBCUTANEOUS at 22:39

## 2023-03-21 RX ADMIN — CHLORHEXIDINE GLUCONATE 15 MILLILITER(S): 213 SOLUTION TOPICAL at 06:23

## 2023-03-21 RX ADMIN — EPINEPHRINE 14.5 MICROGRAM(S)/KG/MIN: 0.3 INJECTION INTRAMUSCULAR; SUBCUTANEOUS at 05:11

## 2023-03-21 RX ADMIN — VASOPRESSIN 4.5 UNIT(S)/MIN: 20 INJECTION INTRAVENOUS at 05:09

## 2023-03-21 RX ADMIN — LACOSAMIDE 120 MILLIGRAM(S): 50 TABLET ORAL at 19:32

## 2023-03-21 RX ADMIN — SODIUM CHLORIDE 500 MILLILITER(S): 9 INJECTION INTRAMUSCULAR; INTRAVENOUS; SUBCUTANEOUS at 22:45

## 2023-03-21 RX ADMIN — Medication 81 MILLIGRAM(S): at 11:15

## 2023-03-21 RX ADMIN — SODIUM CHLORIDE 10 MILLILITER(S): 9 INJECTION INTRAMUSCULAR; INTRAVENOUS; SUBCUTANEOUS at 23:46

## 2023-03-21 RX ADMIN — Medication 50 MILLIEQUIVALENT(S): at 03:40

## 2023-03-21 RX ADMIN — HEPARIN SODIUM 5000 UNIT(S): 5000 INJECTION INTRAVENOUS; SUBCUTANEOUS at 06:23

## 2023-03-21 RX ADMIN — Medication 200 MILLIGRAM(S): at 08:02

## 2023-03-21 RX ADMIN — SODIUM CHLORIDE 1000 MILLILITER(S): 9 INJECTION INTRAMUSCULAR; INTRAVENOUS; SUBCUTANEOUS at 15:17

## 2023-03-21 RX ADMIN — Medication 57.5 MILLIGRAM(S): at 19:16

## 2023-03-21 RX ADMIN — Medication 100 MILLIGRAM(S): at 03:37

## 2023-03-21 RX ADMIN — Medication 500 MILLILITER(S): at 21:04

## 2023-03-21 RX ADMIN — PANTOPRAZOLE SODIUM 40 MILLIGRAM(S): 20 TABLET, DELAYED RELEASE ORAL at 11:15

## 2023-03-21 RX ADMIN — Medication 200 GRAM(S): at 11:14

## 2023-03-21 RX ADMIN — Medication 200 GRAM(S): at 03:39

## 2023-03-21 RX ADMIN — DIVALPROEX SODIUM 500 MILLIGRAM(S): 500 TABLET, DELAYED RELEASE ORAL at 00:09

## 2023-03-21 NOTE — CHART NOTE - NSCHARTNOTEFT_GEN_A_CORE
Limited TTE to r/o effusion:    Limited views given recent surgery but no appreciable pericardial effusion  LV function seems hyperdynamic    Armando Go PGY4 Cardiology Limited TTE to r/o effusion:    Limited views given recent surgery but no appreciable pericardial effusion  LV function seems hyperdynamic with a VTI ~32cm    Armando Go PGY4 Cardiology

## 2023-03-21 NOTE — CONSULT NOTE ADULT - SUBJECTIVE AND OBJECTIVE BOX
EPILEPSY CONSULT NOTE:  HPI:  53 year old male, current every day marijuana use with a past medical hx of HTN, type A aortic dissection s/p Dacron grafts, AV resuspension in 2013, CAD s/p CABG x 1 SVG to RCA (5/2013 with Dr. 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     Epilepsy consulted for facial twitching for which ativan was administered at 1pm on 3/21/23. As per wife, seizure onset in 2018, and he was started on keppra and remained seizure free through 2019. His seizures became refractory as of 01/01/2020, and he was started on depakote and vimpat (controlled on this agent). The most recent event was on 7/4/22, and his neurologist weaned keppra, and lowered depakote from 1500mg Q12hrs to 750mg Q12hrs as of 11/2022. He has been stable and seizure free to date. His events are described and recorded as facial pulling, and occasional arm movement. As per wife, patient has no identifiable seizure risks such as febrile seizures, head trauma w/ LOC, family hx of seizures, meningitis or encephalitis. She also reports that he is compliant with his regimen. As per wife, his seizures were identified as onset from the L frontal lobe, and his neurologist is Delilah Hernandez at [facility].     VPA level on 3/21 is 38.9     Epilepsy risk factors:  Head injury with subsequent LOC?: Wife denies  Febrile seizures in infancy?: Wife denies  Hx of CNS infection?: Wife denies  Family hx of epilepsy?: Wife denies  Known CNS pathology?: Wife denies     Prior AEDs: Keppra     Review of Systems:  Unobtainable 2/2 medically induced coma     PAST MEDICAL & SURGICAL HISTORY:  HTN (hypertension)  Aortic dissection S/P aortic bifurcation bypass graft  CAD (coronary artery disease) S/P CABG x 1  Seizure disorder    FAMILY HISTORY:  No pertinent family history in first degree relatives    Social History:  Alcohol, illicits, smoking?: Wife report everyday marijuana smoker, social drinker and denies illicit drug use.      Allergies  No Known Allergies    MEDICATIONS  (STANDING):  aspirin  chewable 81 milliGRAM(s) Enteral Tube daily  ceFAZolin   IVPB 2000 milliGRAM(s) IV Intermittent every 12 hours  chlorhexidine 0.12% Liquid 15 milliLiter(s) Oral Mucosa every 12 hours  chlorhexidine 2% Cloths 1 Application(s) Topical daily  CRRT Treatment    <Continuous>  dextrose 50% Injectable 50 milliLiter(s) IV Push every 15 minutes  dextrose 50% Injectable 25 milliLiter(s) IV Push every 15 minutes  furosemide Infusion 15 mG/Hr (7.5 mL/Hr) IV Continuous <Continuous>  heparin   Injectable 5000 Unit(s) SubCutaneous every 8 hours  insulin regular Infusion 1 Unit(s)/Hr (1 mL/Hr) IV Continuous <Continuous>  lacosamide IVPB 100 milliGRAM(s) IV Intermittent every 12 hours  milrinone Infusion 0.25 MICROgram(s)/kG/Min (5.78 mL/Hr) IV Continuous <Continuous>  norepinephrine Infusion 0.05 MICROgram(s)/kG/Min (3.61 mL/Hr) IV Continuous <Continuous>  pantoprazole  Injectable 40 milliGRAM(s) IV Push daily  phenylephrine    Infusion 0.5 MICROgram(s)/kG/Min (7.23 mL/Hr) IV Continuous <Continuous>  Phoxillum Filtration BK 4 / 2.5 5000 milliLiter(s) (3000 mL/Hr) CRRT <Continuous>  propofol Infusion 5 MICROgram(s)/kG/Min (2.31 mL/Hr) IV Continuous <Continuous>  sodium chloride 0.9%. 1000 milliLiter(s) (10 mL/Hr) IV Continuous <Continuous>  valproate sodium  IVPB 750 milliGRAM(s) IV Intermittent every 12 hours  vasopressin Infusion 0.03 Unit(s)/Min (4.5 mL/Hr) IV Continuous <Continuous>    VITAL SIGNS:   T(C): 36.4 (03-21-23 @ 17:27), Max: 36.6 (03-21-23 @ 14:28)  HR: 89 (03-21-23 @ 16:00) (89 - 114)  BP: --  RR: 14 (03-21-23 @ 16:00) (14 - 22)  SpO2: 100% (03-21-23 @ 16:00) (95% - 100%)  Wt(kg): --    PHYSICAL EXAM:   Constitutional: Mid-aged man lying in bed intubated and sedated.   Extremities: +2 edema  Skin: no rash or neurocutaneous signs     Cognitive:  Eyes closed w/ scleral edema. Intubated and sedated on propofol 20mcg/kg/hr. No verbal output or following commands.     Cranial Nerves:  III/IV/VI: PERRL 3mm brisk, VII: Face appears symmetric   Motor:  No withdrawal to pain (nurse reports move all limbs off sedation)  Sensation:  Unable to assess  Coordination/Gait:  Unable to assess  Reflexes:  Absent in all limbs, toes mute    Labs  CBC Full  -  ( 21 Mar 2023 13:40 )  WBC Count : 10.25 K/uL  RBC Count : 3.78 M/uL  Hemoglobin : 11.7 g/dL  Hematocrit : 32.8 %  Platelet Count - Automated : 79 K/uL  Mean Cell Volume : 86.8 fl  Mean Cell Hemoglobin : 31.0 pg  Mean Cell Hemoglobin Concentration : 35.7 gm/dL  Auto Neutrophil # : x  Auto Lymphocyte # : x  Auto Monocyte # : x  Auto Eosinophil # : x  Auto Basophil # : x  Auto Neutrophil % : x  Auto Lymphocyte % : x  Auto Monocyte % : x  Auto Eosinophil % : x  Auto Basophil % : x    03-21    148<H>  |  112<H>  |  23  ----------------------------<  113<H>  5.6<H>   |  23  |  2.22<H>    Ca    7.8<L>      21 Mar 2023 13:40  Phos  4.5     03-21  Mg     2.5     03-21    TPro  4.1<L>  /  Alb  2.3<L>  /  TBili  0.5  /  DBili  x   /  AST  310<H>  /  ALT  73<H>  /  AlkPhos  43  03-21    LIVER FUNCTIONS - ( 21 Mar 2023 13:40 )  Alb: 2.3 g/dL / Pro: 4.1 g/dL / ALK PHOS: 43 U/L / ALT: 73 U/L / AST: 310 U/L / GGT: x           PT/INR - ( 21 Mar 2023 13:40 )   PT: 14.7 sec;   INR: 1.23     PTT - ( 21 Mar 2023 13:40 )  PTT:38.1 sec  Valproic Acid Level, Serum: 38.9 ug/mL *L* [50.0 - 100.0] (03-21 @ 12:13)

## 2023-03-21 NOTE — CONSULT NOTE ADULT - ATTENDING COMMENTS
54 yo man with TREY, lactic acidosis and low serum bicarbonate s/p replacement of aortic arch and TAVR on 3/20- prolonged OR time and massive transfusion and other blood products  underlying h/o HTN, seizure disorder.  no underlying CKD.  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 To proceed with CVVHD for clearances

## 2023-03-21 NOTE — CONSULT NOTE ADULT - ASSESSMENT
53 year old male, current every day marijuana use with a past medical hx of HTN, type A aortic dissection s/p Dacron grafts, AV resuspension in 2013, CAD s/p CABG x 1 SVG to RCA (5/2013 with Dr. Medina), seizure disorder (last episode on 7/4/22, on vimpat and depakote) who was admitted, and underwent  replacement of transverse aortic arch, second stage thoracic endovascular aortic repair, aorto-axillary bypass, AV replacement (bio 23mm), and CABG x 1 (SVG-RCA) (EF 75%, [facility], 3/20/23). Epilepsy consulted for facial twitching, and AED management. Unclear if the facial movement was seizure or not, given that his wife report that it did not resemble his typical events.    Recommendations:   - Continue vEEG monitoring   - Continue Depakote 750mg Q12hrs  - Continue Vimpat 100mg Q12hrs  - Maintain seizure and fall precautions     53 year old male, current every day marijuana use with a past medical hx of HTN, type A aortic dissection s/p Dacron grafts, AV resuspension in 2013, CAD s/p CABG x 1 SVG to RCA (5/2013 with Dr. Medina), seizure disorder (last episode on 7/4/22, on vimpat and depakote) who was admitted, and underwent  replacement of transverse aortic arch, second stage thoracic endovascular aortic repair, aorto-axillary bypass, AV replacement (bio 23mm), and CABG x 1 (SVG-RCA) (EF 75%, [facility], 3/20/23). Epilepsy consulted for facial twitching, and AED management. Unclear if the facial movement was seizure or not, given that his wife report that it did not resemble his typical events.    Recommendations:   - Please load depakote 1000mg IV now  - Continue vEEG monitoring   - Continue Depakote 750mg Q12hrs  - Continue Vimpat 100mg Q12hrs  - Maintain seizure and fall precautions

## 2023-03-21 NOTE — PROGRESS NOTE ADULT - SUBJECTIVE AND OBJECTIVE BOX
tolerating CVVHD well  lactate down to 4  HR: 88 (03-21-23 @ 17:55)  BP: -- 124/83  Wt(kg): --  03-21    148<H>  |  112<H>  |  23  ----------------------------<  113<H>  5.6<H>   |  23  |  2.22<H>    Ca    7.8<L>      21 Mar 2023 13:40  Phos  4.5     03-21  Mg     2.5     03-21    TPro  4.1<L>  /  Alb  2.3<L>  /  TBili  0.5  /  DBili  x   /  AST  310<H>  /  ALT  73<H>  /  AlkPhos  43  03-21    Continue CVVHD at Qd 3000/hr  will change dialysate composition to 2K pureflow  prior was on 4K phoxillum for low P - now corrected

## 2023-03-21 NOTE — PROGRESS NOTE ADULT - SUBJECTIVE AND OBJECTIVE BOX
CTICU  CRITICAL  CARE  attending     Hand off received 					   Pertinent clinical, laboratory, radiographic, hemodynamic, echocardiographic, respiratory data, microbiologic data and chart were reviewed and analyzed frequently throughout the course of the day and night      52 year old male with HTN, type A aortic dissection s/p Dacron grafts, AV resuspension in 2013,CAD s/p CABG x 1 SVG to RCA (5/2013 with Dr. Medina), seizure disorder (last episode on 7/4/22).  He is a current every day marijuana user  CTA chest, abdomen and pelvis 11/30/22: Status post graft repair of the ascending aorta and portion of aortic arch.  Dissecting aneurysm of the descending thoracic aorta (measuring up to 5.7 cm) and abdominal aorta (3.5 cm infrarenal), similar to the MR angiogram of 9/19/2022.  Nonocclusive dissection flap present within the innominate artery, aneurysmal right subclavian artery and proximal right common carotid artery as well as aneurysmal left common iliac artery.    He was referred by Dr. Jacqueline Gonsales.  He ws screened for Triomphe study but did not qualify.    S/P AVR  Aortic arch replacement  S/P CABG (SVG to RCA).  S/P Aorto left axillary by pass.  S/P TEVAR to descending aorta.        FAMILY HISTORY:  No pertinent family history in first degree relatives    PAST MEDICAL & SURGICAL HISTORY:  HTN (hypertension)      Aortic dissection      CAD (coronary artery disease)  Seizure disorder  S/P aortic bifurcation bypass graft  S/P CABG x 1             14 system review was unremarkable    Vital signs, hemodynamic and respiratory parameters were reviewed from the bedside nursing flow sheet.  ICU Vital Signs Last 24 Hrs  T(C): 36 (21 Mar 2023 05:21), Max: 36 (21 Mar 2023 05:21)  T(F): 96.8 (21 Mar 2023 05:21), Max: 96.8 (21 Mar 2023 05:21)  HR: 95 (21 Mar 2023 08:00) (95 - 114)  BP: --  BP(mean): --  ABP: 107/71 (21 Mar 2023 08:00) (89/55 - 129/69)  ABP(mean): 82 (21 Mar 2023 08:00) (66 - 99)  RR: 14 (21 Mar 2023 08:00) (14 - 22)  SpO2: 100% (21 Mar 2023 08:00) (95% - 100%)    O2 Parameters below as of 21 Mar 2023 08:00  Patient On (Oxygen Delivery Method): ventilator    O2 Concentration (%): 50      Adult Advanced Hemodynamics Last 24 Hrs  CVP(mm Hg): 13 (21 Mar 2023 08:00) (-2 - 14)  CVP(cm H2O): --  CO: 4 (21 Mar 2023 08:00) (4 - 6.9)  CI: 2 (21 Mar 2023 08:00) (2 - 3.4)  PA: 30/22 (21 Mar 2023 08:00) (21/10 - 31/22)  PA(mean): 26 (21 Mar 2023 08:00) (15 - 26)  PCWP: --  SVR: 1378 (21 Mar 2023 08:00) (880 - 1378)  SVRI: 2756 (21 Mar 2023 08:00) (1786 - 2756)  PVR: --  PVRI: --, ABG - ( 21 Mar 2023 05:58 )  pH, Arterial: 7.19  pH, Blood: x     /  pCO2: 36    /  pO2: 132   / HCO3: 14    / Base Excess: -13.5 /  SaO2: 100.0             Mode: AC/ CMV (Assist Control/ Continuous Mandatory Ventilation)  RR (machine): 14  TV (machine): 650  FiO2: 50  PEEP: 5  ITime: 1  MAP: 17  PIP: 22    Intake and output was reviewed and the fluid balance was calculated  Daily     Daily   I&O's Summary    20 Mar 2023 07:01  -  21 Mar 2023 07:00  --------------------------------------------------------  IN: 2158.6 mL / OUT: 1230 mL / NET: 928.6 mL    21 Mar 2023 07:01  -  21 Mar 2023 09:07  --------------------------------------------------------  IN: 344.5 mL / OUT: 125 mL / NET: 219.5 mL        All lines and drain sites were assessed      Neuro: Pupils 2-3 mm (Reactive). Moves all 4 extremities.  (Not to commands).  Neck: No JVD.  CVS: S1, S2, No S3.  Lungs: Good air entry bilaterally.   Abd: Soft. No tenderness. + Bowel sounds.  Vascular: + DP/PT.  Extremities: No edema.  Lymphatic: Normal.  Skin: No abnormalities.      labs  CBC Full  -  ( 21 Mar 2023 05:59 )  WBC Count : 10.59 K/uL  RBC Count : 3.02 M/uL  Hemoglobin : 9.3 g/dL  Hematocrit : 28.2 %  Platelet Count - Automated : 127 K/uL  Mean Cell Volume : 93.4 fl  Mean Cell Hemoglobin : 30.8 pg  Mean Cell Hemoglobin Concentration : 33.0 gm/dL  Auto Neutrophil # : 8.26 K/uL  Auto Lymphocyte # : 0.95 K/uL  Auto Monocyte # : 1.05 K/uL  Auto Eosinophil # : 0.01 K/uL  Auto Basophil # : 0.03 K/uL  Auto Neutrophil % : 78.0 %  Auto Lymphocyte % : 9.0 %  Auto Monocyte % : 9.9 %  Auto Eosinophil % : 0.1 %  Auto Basophil % : 0.3 %    03-21    150<H>  |  109<H>  |  21  ----------------------------<  143<H>  5.0   |  14<L>  |  2.13<H>    Ca    8.2<L>      21 Mar 2023 05:59  Phos  3.9     03-21  Mg     2.9     03-21    TPro  4.0<L>  /  Alb  2.2<L>  /  TBili  0.4  /  DBili  x   /  AST  112<H>  /  ALT  21  /  AlkPhos  42  03-21    PT/INR - ( 21 Mar 2023 05:59 )   PT: 14.8 sec;   INR: 1.24          PTT - ( 21 Mar 2023 05:59 )  PTT:40.1 sec  The current medications were reviewed   MEDICATIONS  (STANDING):  aspirin enteric coated 81 milliGRAM(s) Oral daily  ceFAZolin   IVPB 2000 milliGRAM(s) IV Intermittent every 8 hours  chlorhexidine 0.12% Liquid 5 milliLiter(s) Oral Mucosa two times a day  chlorhexidine 0.12% Liquid 15 milliLiter(s) Oral Mucosa every 12 hours  chlorhexidine 2% Cloths 1 Application(s) Topical daily  dextrose 50% Injectable 50 milliLiter(s) IV Push every 15 minutes  dextrose 50% Injectable 25 milliLiter(s) IV Push every 15 minutes  divalproex  milliGRAM(s) Oral at bedtime  furosemide Infusion 15 mG/Hr (7.5 mL/Hr) IV Continuous <Continuous>  heparin   Injectable 5000 Unit(s) SubCutaneous every 8 hours  insulin regular Infusion 1 Unit(s)/Hr (1 mL/Hr) IV Continuous <Continuous>  lacosamide 100 milliGRAM(s) Oral two times a day  milrinone Infusion 0.25 MICROgram(s)/kG/Min (5.78 mL/Hr) IV Continuous <Continuous>  norepinephrine Infusion 0.05 MICROgram(s)/kG/Min (7.23 mL/Hr) IV Continuous <Continuous>  pantoprazole  Injectable 40 milliGRAM(s) IV Push daily  phenylephrine    Infusion 0.5 MICROgram(s)/kG/Min (14.5 mL/Hr) IV Continuous <Continuous>  propofol Infusion 5 MICROgram(s)/kG/Min (2.31 mL/Hr) IV Continuous <Continuous>  sodium bicarbonate  Infusion 0.146 mEq/kG/Hr (75 mL/Hr) IV Continuous <Continuous>  sodium chloride 0.9%. 1000 milliLiter(s) (10 mL/Hr) IV Continuous <Continuous>  vasopressin Infusion 0.03 Unit(s)/Min (4.5 mL/Hr) IV Continuous <Continuous>    MEDICATIONS  (PRN):             53 year old  Male with expanding aortic arch and descending aortic aneurysm   S/P AVR  Aortic arch replacement  S/P CABG (SVG to RCA).  S/P Aorto left axillary by pass.  S/P TEVAR to descending aorta.  Intra operative Infusions - Crystalloids (5000cc LR. IV Infusions)  - Blood Products (unit/s)	7units PRBC, 6unit FFP, 3unit platelet, 15unitc cryo, 1000cc fieba  Arrived on high doses of epinephrine and norepinephrine.  Postoperative course complicated by severe metabolic acidosis and renal failure, hypophosphatemia, cardiogenic shock like condition requiring high doses of vasopressors.  Bedside ECHO negative for tamponade.  Hemodynamically stable. (On vasopressin, norepinephrine and phenylephrine).  Lactic acid trending down.   Good oxygenation.  Improved  urine out put with diuretic infusion. .        My plan includes :  Continue vent support and inhaled nitric oxide.  D/C norepinephrine and epinephrine.  Decrease phenylephrine as tolerated.  Start CVVHD  Statin Rx.  Consider CT head.  Close hemodynamic, ventilatory and drain monitoring and management  Monitor for arrhythmias and monitor parameters for organ perfusion  Monitor neurologic status  Monitor renal function.  Head of the bed should remain elevated to 45 deg .   Chest PT and IS will be encouraged  Monitor adequacy of oxygenation and ventilation and attempt to wean oxygen  Nutritional goals will be met using po eventually , ensure adequate caloric intake and monitor the same  Stress ulcer and VTE prophylaxis will be achieved    Glycemic control is satisfactory  Electrolytes have been repleted as necessary and wound care has been carried out. Pain control has been achieved.   Aggressive physical therapy and early mobility and ambulation goals will be met   The family was updated about the course and plan  CRITICAL CARE TIME SPENT in evaluation and management, reassessments, review and interpretation of labs and x-rays, ventilator and hemodynamic management, formulating a plan and coordinating care: ___60____ MIN.  Time does not include procedural time.  CTICU ATTENDING     					    José Miguel Torres MD

## 2023-03-21 NOTE — PROGRESS NOTE ADULT - SUBJECTIVE AND OBJECTIVE BOX
CTICU  CRITICAL  CARE  attending     Hand off received 					   Pertinent clinical, laboratory, radiographic, hemodynamic, echocardiographic, respiratory data, microbiologic data and chart were reviewed and analyzed frequently throughout the course of the day  Patient seen and examined with CTS/ SH attending at bedside  Pt is a 53yr old male with PMH HTN, type A dissection sp Dacron grafts and AV resuspension (2013), CAD sp CABG x 1 ([facility], 5/2013, SVG-RCA), seizures, admitted for surgical mgmt of progression of aneurysmal disease. Patient underwent replacement of transverse aortic arch, second stage thoracic endovascular aortic repair, aorto-axillary bypass, AV replacement (bio 23mm), and CABG x 1 (SVG-RCA) EF 75% with Dr. Pierre 3/20. Patient received 7 units pRBC, 6 FFP, 4 plt, 15 cryo, 1000 FEIBA, and uo 1300cc. Started on lasix infusion and phenylephrine, bicarb, Armaan, levo and vaso, with lactic acidosis.     FAMILY HISTORY:  No pertinent family history in first degree relatives  PAST MEDICAL & SURGICAL HISTORY:  HTN (hypertension)  Aortic dissection  CAD (coronary artery disease)  Seizure disorder  S/P aortic bifurcation bypass graft  S/P CABG x 1        Patient is a 53y old  Male who presents with a chief complaint of expanding aortic arch and descending aortic aneurysm.    14 system review was unremarkable    Vital signs, hemodynamic and respiratory parameters were reviewed from the bedside nursing flowsheet.  ICU Vital Signs Last 24 Hrs  T(C): 36 (21 Mar 2023 05:21), Max: 36 (21 Mar 2023 05:21)  T(F): 96.8 (21 Mar 2023 05:21), Max: 96.8 (21 Mar 2023 05:21)  HR: 95 (21 Mar 2023 08:00) (95 - 114)  BP: --  BP(mean): --  ABP: 107/71 (21 Mar 2023 08:00) (89/55 - 129/69)  ABP(mean): 82 (21 Mar 2023 08:00) (66 - 99)  RR: 14 (21 Mar 2023 08:00) (14 - 22)  SpO2: 100% (21 Mar 2023 08:00) (95% - 100%)    O2 Parameters below as of 21 Mar 2023 08:00  Patient On (Oxygen Delivery Method): ventilator    O2 Concentration (%): 50      Adult Advanced Hemodynamics Last 24 Hrs  CVP(mm Hg): 13 (21 Mar 2023 08:00) (-2 - 14)  CVP(cm H2O): --  CO: 4 (21 Mar 2023 08:00) (4 - 6.9)  CI: 2 (21 Mar 2023 08:00) (2 - 3.4)  PA: 30/22 (21 Mar 2023 08:00) (21/10 - 31/22)  PA(mean): 26 (21 Mar 2023 08:00) (15 - 26)  PCWP: --  SVR: 1378 (21 Mar 2023 08:00) (880 - 1378)  SVRI: 2756 (21 Mar 2023 08:00) (1786 - 2756)  PVR: --  PVRI: --, ABG - ( 21 Mar 2023 05:58 )  pH, Arterial: 7.19  pH, Blood: x     /  pCO2: 36    /  pO2: 132   / HCO3: 14    / Base Excess: -13.5 /  SaO2: 100.0             Mode: AC/ CMV (Assist Control/ Continuous Mandatory Ventilation)  RR (machine): 14  TV (machine): 650  FiO2: 50  PEEP: 5  ITime: 1  MAP: 17  PIP: 22    Intake and output was reviewed and the fluid balance was calculated  Daily     Daily   I&O's Summary    20 Mar 2023 07:01  -  21 Mar 2023 07:00  --------------------------------------------------------  IN: 2158.6 mL / OUT: 1230 mL / NET: 928.6 mL    21 Mar 2023 07:01  -  21 Mar 2023 08:50  --------------------------------------------------------  IN: 344.5 mL / OUT: 125 mL / NET: 219.5 mL        All lines and drain sites were assessed  Glycemic trend was reviewed  POCT Blood Glucose.: 132 mg/dL (20 Mar 2023 23:45)      Neuro: sedated  HEENT: mmm  Heart: s1 s2   Lungs: decreased bl  Abdomen: soft nt nd  Extremities: 2+dp    Lines:  RIJ Cordis with [place] 3/20  R radial arterial line 3/20  L femoral arterial line 3/20    Tubes:  Med bella x 3  Pleural bella x 2    labs  CBC Full  -  ( 21 Mar 2023 05:59 )  WBC Count : 10.59 K/uL  RBC Count : 3.02 M/uL  Hemoglobin : 9.3 g/dL  Hematocrit : 28.2 %  Platelet Count - Automated : 127 K/uL  Mean Cell Volume : 93.4 fl  Mean Cell Hemoglobin : 30.8 pg  Mean Cell Hemoglobin Concentration : 33.0 gm/dL  Auto Neutrophil # : 8.26 K/uL  Auto Lymphocyte # : 0.95 K/uL  Auto Monocyte # : 1.05 K/uL  Auto Eosinophil # : 0.01 K/uL  Auto Basophil # : 0.03 K/uL  Auto Neutrophil % : 78.0 %  Auto Lymphocyte % : 9.0 %  Auto Monocyte % : 9.9 %  Auto Eosinophil % : 0.1 %  Auto Basophil % : 0.3 %    03-21    150<H>  |  109<H>  |  21  ----------------------------<  143<H>  5.0   |  14<L>  |  2.13<H>    Ca    8.2<L>      21 Mar 2023 05:59  Phos  3.9     03-21  Mg     2.9     03-21    TPro  4.0<L>  /  Alb  2.2<L>  /  TBili  0.4  /  DBili  x   /  AST  112<H>  /  ALT  21  /  AlkPhos  42  03-21    PT/INR - ( 21 Mar 2023 05:59 )   PT: 14.8 sec;   INR: 1.24          PTT - ( 21 Mar 2023 05:59 )  PTT:40.1 sec  The current medications were reviewed   MEDICATIONS  (STANDING):  aspirin enteric coated 81 milliGRAM(s) Oral daily  ceFAZolin   IVPB 2000 milliGRAM(s) IV Intermittent every 8 hours  chlorhexidine 0.12% Liquid 5 milliLiter(s) Oral Mucosa two times a day  chlorhexidine 0.12% Liquid 15 milliLiter(s) Oral Mucosa every 12 hours  chlorhexidine 2% Cloths 1 Application(s) Topical daily  dextrose 50% Injectable 50 milliLiter(s) IV Push every 15 minutes  dextrose 50% Injectable 25 milliLiter(s) IV Push every 15 minutes  divalproex  milliGRAM(s) Oral at bedtime  furosemide Infusion 15 mG/Hr (7.5 mL/Hr) IV Continuous <Continuous>  heparin   Injectable 5000 Unit(s) SubCutaneous every 8 hours  insulin regular Infusion 1 Unit(s)/Hr (1 mL/Hr) IV Continuous <Continuous>  lacosamide 100 milliGRAM(s) Oral two times a day  milrinone Infusion 0.25 MICROgram(s)/kG/Min (5.78 mL/Hr) IV Continuous <Continuous>  norepinephrine Infusion 0.05 MICROgram(s)/kG/Min (7.23 mL/Hr) IV Continuous <Continuous>  pantoprazole  Injectable 40 milliGRAM(s) IV Push daily  phenylephrine    Infusion 0.5 MICROgram(s)/kG/Min (14.5 mL/Hr) IV Continuous <Continuous>  propofol Infusion 5 MICROgram(s)/kG/Min (2.31 mL/Hr) IV Continuous <Continuous>  sodium bicarbonate  Infusion 0.146 mEq/kG/Hr (75 mL/Hr) IV Continuous <Continuous>  sodium chloride 0.9%. 1000 milliLiter(s) (10 mL/Hr) IV Continuous <Continuous>  vasopressin Infusion 0.03 Unit(s)/Min (4.5 mL/Hr) IV Continuous <Continuous>    MEDICATIONS  (PRN):      Assessment/Plan:  53yr old male with PMH HTN, type A dissection sp Dacron grafts and AV resuspension (2013), CAD sp CABG x 1 ([facility], 5/2013, SVG-RCA), seizures, admitted for surgical mgmt of progression of aneurysmal disease.     POD1 replacement of transverse aortic arch, second stage thoracic endovascular aortic repair, aorto-axillary bypass, AV replacement (bio 23mm), and CABG x 1 (SVG-RCA) (EF 75%, [place], 3/20)  RASS goal 0/-1, wean sedation to assess neuro status  Acute respiratory failure requiring mechanical ventilation-keep  Cardiogenic shock requiring primacor-keep, may need additional inotrophy with dobutamine  Vasogenic shock requiring levo, vaso, laquita for MAP goal >65  on Armaan-keep for now  Lactic acidosis-etiology unclear-long bypass time?-serial lactate  Metabolic acidosis-start CVVHD, appreciate renal recs  On bicarb gtt and lasix gtt  Acute post operative anemia-trend H/H  Thrombocytopenia-monitor  Mild LFT elevation-trend  Send CK per renal recs   TREY now  Periop abx  Replete lytes prn  Monitor CT output  GI/DVT PPX  Bowel Regimen  Pain control  Close hemodynamic, ventilatory and drain monitoring and management per post op routine  Monitor for arrhythmias and monitor parameters for organ perfusion  Beta blockade not administered due to hemodynamic instability and bradycardia  Monitor neurologic status  Head of the bed should remain elevated to 45 deg   Chest PT and IS will be encouraged  Monitor adequacy of oxygenation and ventilation and attempt to wean oxygen  Antibiotic regimen will be tailored to the clinical, laboratory and microbiologic data  Nutritional goals will be met using po eventually, ensure adequate caloric intake and monitor the same  Stress ulcer and VTE prophylaxis will be achieved    Glycemic control is satisfactory  Electrolytes have been repleted as necessary and wound care has been carried out   Pain control has been achieved.   Aggressive physical therapy and early mobility and ambulation goals will be met   The family was updated about the course and plan.    CRITICAL CARE TIME personally provided by me  in evaluation and management, reassessments, review and interpretation of labs and x-rays, ventilator and hemodynamic management, formulating a plan and coordinating care: ___120____ MIN.  Time does not include procedural time.    CTICU ATTENDING     					  Alexx Brooks MD

## 2023-03-21 NOTE — CONSULT NOTE ADULT - SUBJECTIVE AND OBJECTIVE BOX
HPI:  53 yo M w/ HTN, seizure disorder, chronic aortic dissection and aortic aneurysm without rupture who was referred for evaluation of hi aortic pathology. Seen by CT surgery who recommended total arch replacement. Pt now s/p replacement of aortic arch and TEVAR on 3/20. Pt transferred to [facility] post op and nephrology consulted for HD. Post op pt remains intubated and on low dose of pressors. Also started on lasix drip, resulting in 480 ml of UO in the last 12 hours.   Cr 1.28 3/9    PAST MEDICAL & SURGICAL HISTORY:  HTN (hypertension)      Aortic dissection      CAD (coronary artery disease)      Seizure disorder      S/P aortic bifurcation bypass graft      S/P CABG x 1            Allergies:  No Known Allergies      Home Medications:   amLODIPine 10 mg oral tablet: 1 tab(s) orally 2 times a day  Aspirin Enteric Coated 81 mg oral delayed release tablet: 1 tab(s) orally once a day  atenolol 100 mg oral tablet: 1 tab(s) orally 2 times a day  atorvastatin 20 mg oral tablet: 1 tab(s) orally 2 times a day  Depakote  mg oral tablet, extended release: 1 tab(s) orally 2 times a day  lacosamide 100 mg oral tablet: 1 tab(s) orally 2 times a day  losartan 50 mg oral tablet: 1 tab(s) orally 2 times a day      Hospital Medications:   MEDICATIONS  (STANDING):  aspirin enteric coated 81 milliGRAM(s) Oral daily  ceFAZolin   IVPB 2000 milliGRAM(s) IV Intermittent every 8 hours  chlorhexidine 0.12% Liquid 5 milliLiter(s) Oral Mucosa two times a day  chlorhexidine 0.12% Liquid 15 milliLiter(s) Oral Mucosa every 12 hours  chlorhexidine 2% Cloths 1 Application(s) Topical daily  dextrose 50% Injectable 50 milliLiter(s) IV Push every 15 minutes  dextrose 50% Injectable 25 milliLiter(s) IV Push every 15 minutes  divalproex  milliGRAM(s) Oral at bedtime  EPINEPHrine    Infusion 0.05 MICROgram(s)/kG/Min (14.5 mL/Hr) IV Continuous <Continuous>  furosemide   Injectable 80 milliGRAM(s) IV Push once  furosemide   Injectable 20 milliGRAM(s) IV Push once  furosemide Infusion 5 mG/Hr (2.5 mL/Hr) IV Continuous <Continuous>  heparin   Injectable 5000 Unit(s) SubCutaneous every 8 hours  insulin regular Infusion 1 Unit(s)/Hr (1 mL/Hr) IV Continuous <Continuous>  lacosamide 100 milliGRAM(s) Oral two times a day  meperidine     Injectable 25 milliGRAM(s) IV Push once  milrinone Infusion 0.25 MICROgram(s)/kG/Min (5.78 mL/Hr) IV Continuous <Continuous>  norepinephrine Infusion 0.05 MICROgram(s)/kG/Min (7.23 mL/Hr) IV Continuous <Continuous>  pantoprazole  Injectable 40 milliGRAM(s) IV Push daily  phenylephrine    Infusion 0.5 MICROgram(s)/kG/Min (14.5 mL/Hr) IV Continuous <Continuous>  propofol Infusion 5 MICROgram(s)/kG/Min (2.31 mL/Hr) IV Continuous <Continuous>  sodium bicarbonate  Infusion 0.146 mEq/kG/Hr (75 mL/Hr) IV Continuous <Continuous>  sodium chloride 0.9%. 1000 milliLiter(s) (10 mL/Hr) IV Continuous <Continuous>  vasopressin Infusion 0.03 Unit(s)/Min (4.5 mL/Hr) IV Continuous <Continuous>      SOCIAL HISTORY:  Denies ETOh, Smoking    Family History:  FAMILY HISTORY:  No pertinent family history in first degree relatives          VITALS:  T(F): 96.8 (23 @ 05:21), Max: 96.8 (23 @ 05:21)  HR: 95 (23 @ 08:00)  BP: --  RR: 14 (23 @ 08:00)  SpO2: 100% (23 @ 08:00)  Wt(kg): --     @ 07:  -   @ 07:00  --------------------------------------------------------  IN: 2158.6 mL / OUT: 1230 mL / NET: 928.6 mL     @ 07:01  -   @ 08:39  --------------------------------------------------------  IN: 344.5 mL / OUT: 125 mL / NET: 219.5 mL        CAPILLARY BLOOD GLUCOSE      POCT Blood Glucose.: 132 mg/dL (20 Mar 2023 23:45)      Review of Systems:  Negative except as mentioned in HPI    PHYSICAL EXAM:  GENERAL: Alert, awake, oriented x3   CHEST/LUNG: Bilateral clear breath sounds  HEART: Regular rate and rhythm, no murmur, no gallops, no rub   ABDOMEN: Soft, nontender, non distended  : No flank or supra pubic tenderness.  EXTREMITIES: no pedal edema  Neurology: AAOx3, no focal neurological deficit      LABS:      150<H>  |  109<H>  |  21  ----------------------------<  143<H>  5.0   |  14<L>  |  2.13<H>    Ca    8.2<L>      21 Mar 2023 05:59  Phos  3.9       Mg     2.9         TPro  4.0<L>  /  Alb  2.2<L>  /  TBili  0.4  /  DBili      /  AST  112<H>  /  ALT  21  /  AlkPhos  42  -    Creatinine Trend: 2.13 <--, 1.92 <--, 1.69 <--, 1.44 <--                        9.3    10.59 )-----------( 127      ( 21 Mar 2023 05:59 )             28.2     Urine Studies:  Urinalysis Basic - ( 21 Mar 2023 01:22 )    Color: Yellow / Appearance: Clear / S.020 / pH:   Gluc:  / Ketone: NEGATIVE  / Bili: Negative / Urobili: 0.2 E.U./dL   Blood:  / Protein: Trace mg/dL / Nitrite: NEGATIVE   Leuk Esterase: NEGATIVE / RBC: < 5 /HPF / WBC 5-10 /HPF   Sq Epi:  / Non Sq Epi: 0-5 /HPF / Bacteria: Present /HPF                   HPI:  53 yo M w/ HTN, seizure disorder, chronic aortic dissection and aortic aneurysm without rupture who was referred for evaluation of hi aortic pathology. Seen by CT surgery who recommended total arch replacement. Pt now s/p replacement of aortic arch and TEVAR on 3/20. Pt transferred to [facility] post op and nephrology consulted for HD. Intra-op pt required 7 units pRBC, 6 units FFP, 3 units platelets, 15 units cryo, 1L fieba and 5L LR. Pt had 1300 ml UO during surgery which lasted for a significant period of time. Post op pt remains intubated and on low dose of pressors. Initially had low UO and received 4 L NS along with 100mg IV lasix. He was then started on lasix drip at 5mg/hr which improved UO, lasix was titrated up to 15mg/hr resulting in 480 ml of UO in the last 12 hours. Also started on bicarb drip 5 AM after receiving 2 amps of bicarb last night for severe acidosis (bicarb 13, pH 7.19)    PAST MEDICAL & SURGICAL HISTORY:  HTN (hypertension)      Aortic dissection      CAD (coronary artery disease)      Seizure disorder      S/P aortic bifurcation bypass graft      S/P CABG x 1            Allergies:  No Known Allergies      Home Medications:   amLODIPine 10 mg oral tablet: 1 tab(s) orally 2 times a day  Aspirin Enteric Coated 81 mg oral delayed release tablet: 1 tab(s) orally once a day  atenolol 100 mg oral tablet: 1 tab(s) orally 2 times a day  atorvastatin 20 mg oral tablet: 1 tab(s) orally 2 times a day  Depakote  mg oral tablet, extended release: 1 tab(s) orally 2 times a day  lacosamide 100 mg oral tablet: 1 tab(s) orally 2 times a day  losartan 50 mg oral tablet: 1 tab(s) orally 2 times a day      Hospital Medications:   MEDICATIONS  (STANDING):  aspirin enteric coated 81 milliGRAM(s) Oral daily  ceFAZolin   IVPB 2000 milliGRAM(s) IV Intermittent every 8 hours  chlorhexidine 0.12% Liquid 5 milliLiter(s) Oral Mucosa two times a day  chlorhexidine 0.12% Liquid 15 milliLiter(s) Oral Mucosa every 12 hours  chlorhexidine 2% Cloths 1 Application(s) Topical daily  dextrose 50% Injectable 50 milliLiter(s) IV Push every 15 minutes  dextrose 50% Injectable 25 milliLiter(s) IV Push every 15 minutes  divalproex  milliGRAM(s) Oral at bedtime  EPINEPHrine    Infusion 0.05 MICROgram(s)/kG/Min (14.5 mL/Hr) IV Continuous <Continuous>  furosemide   Injectable 80 milliGRAM(s) IV Push once  furosemide   Injectable 20 milliGRAM(s) IV Push once  furosemide Infusion 5 mG/Hr (2.5 mL/Hr) IV Continuous <Continuous>  heparin   Injectable 5000 Unit(s) SubCutaneous every 8 hours  insulin regular Infusion 1 Unit(s)/Hr (1 mL/Hr) IV Continuous <Continuous>  lacosamide 100 milliGRAM(s) Oral two times a day  meperidine     Injectable 25 milliGRAM(s) IV Push once  milrinone Infusion 0.25 MICROgram(s)/kG/Min (5.78 mL/Hr) IV Continuous <Continuous>  norepinephrine Infusion 0.05 MICROgram(s)/kG/Min (7.23 mL/Hr) IV Continuous <Continuous>  pantoprazole  Injectable 40 milliGRAM(s) IV Push daily  phenylephrine    Infusion 0.5 MICROgram(s)/kG/Min (14.5 mL/Hr) IV Continuous <Continuous>  propofol Infusion 5 MICROgram(s)/kG/Min (2.31 mL/Hr) IV Continuous <Continuous>  sodium bicarbonate  Infusion 0.146 mEq/kG/Hr (75 mL/Hr) IV Continuous <Continuous>  sodium chloride 0.9%. 1000 milliLiter(s) (10 mL/Hr) IV Continuous <Continuous>  vasopressin Infusion 0.03 Unit(s)/Min (4.5 mL/Hr) IV Continuous <Continuous>      SOCIAL HISTORY:  Denies ETOh, Smoking    Family History:  FAMILY HISTORY:  No pertinent family history in first degree relatives          VITALS:  T(F): 96.8 (23 @ 05:21), Max: 96.8 (23 @ 05:21)  HR: 95 (23 @ 08:00)  BP: --  RR: 14 (23 @ 08:00)  SpO2: 100% (23 @ 08:00)  Wt(kg): --     @ 07:01  -   @ 07:00  --------------------------------------------------------  IN: 2158.6 mL / OUT: 1230 mL / NET: 928.6 mL     @ 07:01  -   @ 08:39  --------------------------------------------------------  IN: 344.5 mL / OUT: 125 mL / NET: 219.5 mL        CAPILLARY BLOOD GLUCOSE      POCT Blood Glucose.: 132 mg/dL (20 Mar 2023 23:45)      Review of Systems:  Unable to obtain    PHYSICAL EXAM:  GENERAL: Intubated, sedated  CHEST/LUNG: Bilateral clear breath sounds, on MV  HEART: Regular rate and rhythm, no murmur  ABDOMEN: Soft, nondistended  : Cantrell in place  EXTREMITIES: no pedal edema  Neurology: Sedated  ACCESS: Lt fem temp HD cath    LABS:      150<H>  |  109<H>  |  21  ----------------------------<  143<H>  5.0   |  14<L>  |  2.13<H>    Ca    8.2<L>      21 Mar 2023 05:59  Phos  3.9       Mg     2.9         TPro  4.0<L>  /  Alb  2.2<L>  /  TBili  0.4  /  DBili      /  AST  112<H>  /  ALT  21  /  AlkPhos  42      Creatinine Trend: 2.13 <--, 1.92 <--, 1.69 <--, 1.44 <--                        9.3    10.59 )-----------( 127      ( 21 Mar 2023 05:59 )             28.2     Urine Studies:  Urinalysis Basic - ( 21 Mar 2023 01:22 )    Color: Yellow / Appearance: Clear / S.020 / pH:   Gluc:  / Ketone: NEGATIVE  / Bili: Negative / Urobili: 0.2 E.U./dL   Blood:  / Protein: Trace mg/dL / Nitrite: NEGATIVE   Leuk Esterase: NEGATIVE / RBC: < 5 /HPF / WBC 5-10 /HPF   Sq Epi:  / Non Sq Epi: 0-5 /HPF / Bacteria: Present /HPF

## 2023-03-21 NOTE — CONSULT NOTE ADULT - ASSESSMENT
52 yo M w/ HTN, seizure disorder and no underlying CKD who is s/p scheduled replacement of aortic arch and TEVAR on 3/20. Prolonger surgery with significant amount of blood products and fluid recieved intra-op. Post-op pt with severe acidosis. Nephrology consulted for HD    #Non-oliguric TREY  BCr 1.2, eGFR 67 (3/9)  Electrolytes noted with Na 150, bicarb 14  UA w/ trace proteinuria and large blood w/o RBCs  Etiology likely iATN given intra-op pt requiring 7 units pRBC and currently in shock  Will initiate CVVHD for refractory acidosis. Goal for no fluid removal, clearance only w/  ml/min, using Phoxillum dialysate given the low phos last night and infusing it at 3000 ml/hr to enhance clearance  Check phos Q6hr for the first 24 hours of CVVHD, then daily  Check CK  Strict I&Os  Noted pt on lasix drip at 15mg/hr and currently running net positive. Will continue to monitor closely    #Met acidosis  due to lactic acidosis  Management as above w/ CVVHD and treatment of underlying cause of lactate production  OK to dc bicarb drip once CVVHD starts    #Hypernatremia  due to insensible losses and sodium bicarb  anticipate improvement w/ cvvhd and dc'ing bicarb drip  BMP Q8hr       54 yo M w/ HTN, seizure disorder and no underlying CKD who is s/p scheduled replacement of aortic arch and TEVAR on 3/20. Prolonger surgery with significant amount of blood products and fluid recieved intra-op. Post-op pt with severe acidosis. Nephrology consulted for HD    #Non-oliguric TREY  BCr 1.2, eGFR 67 (3/9)  Electrolytes noted with Na 150, bicarb 14  UA w/ trace proteinuria and large blood w/o RBCs  Etiology likely iATN given intra-op pt requiring 7 units pRBC and currently in shock  Will initiate CVVHD for refractory acidosis. Goal for no fluid removal, clearance only w/  ml/min, using Phoxillum dialysate given the low phos last night and infusing it at 3000 ml/hr to enhance clearance  In case CVVHD system clots, will restart w/ heparin drip. Goal APTT 1.5-2 x normal. Discussed w/ pharmacy  Check phos Q6hr for the first 24 hours of CVVHD, then daily  Check CK  Strict I&Os  Noted pt on lasix drip at 15mg/hr and currently running net positive. Will continue to monitor closely    #Met acidosis  due to lactic acidosis  Management as above w/ CVVHD and treatment of underlying cause of lactate production  OK to dc bicarb drip once CVVHD starts    #Hypernatremia  due to insensible losses and sodium bicarb  anticipate improvement w/ cvvhd and dc'ing bicarb drip  BMP Q8hr

## 2023-03-21 NOTE — PROGRESS NOTE ADULT - SUBJECTIVE AND OBJECTIVE BOX
CTICU  CRITICAL  CARE  attending     Hand off received 					   Pertinent clinical, laboratory, radiographic, hemodynamic, echocardiographic, respiratory data, microbiologic data and chart were reviewed and analyzed frequently throughout the course of the day and night        52 year old male with HTN, type A aortic dissection s/p Dacron grafts, AV resuspension in 2013,CAD s/p CABG x 1 SVG to RCA (5/2013 with Dr. Medina), seizure disorder (last episode on 7/4/22).  He is a current every day marijuana user  CTA chest, abdomen and pelvis 11/30/22: Status post graft repair of the ascending aorta and portion of aortic arch.  Dissecting aneurysm of the descending thoracic aorta (measuring up to 5.7 cm) and abdominal aorta (3.5 cm infrarenal), similar to the MR angiogram of 9/19/2022.  Nonocclusive dissection flap present within the innominate artery, aneurysmal right subclavian artery and proximal right common carotid artery as well as aneurysmal left common iliac artery.    He was referred by Dr. Jacqueline Gonsales.  He ws screened for Triomphe study but did not qualify.    S/P AVR  Aortic arch replacement  S/P CABG (SVG to RCA).  S/P Aorto left axillary by pass.  S/P TEVAR to descending aorta.      FAMILY HISTORY:  No pertinent family history in first degree relatives    PAST MEDICAL & SURGICAL HISTORY:  HTN (hypertension)  Aortic dissection  CAD (coronary artery disease)  Seizure disorder  S/P aortic bifurcation bypass graft  S/P CABG x 1          14 system review was unremarkable    Vital signs, hemodynamic and respiratory parameters were reviewed from the bedside nursing flow sheet.  ICU Vital Signs Last 24 Hrs  T(C): 36.2 (21 Mar 2023 21:00), Max: 36.6 (21 Mar 2023 14:28)  T(F): 97.2 (21 Mar 2023 21:00), Max: 97.9 (21 Mar 2023 14:28)  HR: 87 (21 Mar 2023 21:00) (86 - 114)  BP: --  BP(mean): --  ABP: 104/71 (21 Mar 2023 21:00) (89/55 - 131/79)  ABP(mean): 82 (21 Mar 2023 21:00) (66 - 104)  RR: 14 (21 Mar 2023 21:00) (14 - 22)  SpO2: 100% (21 Mar 2023 21:00) (100% - 100%)    O2 Parameters below as of 21 Mar 2023 21:00  Patient On (Oxygen Delivery Method): ventilator, CMV 50%/650/14/5    O2 Concentration (%): 50      Adult Advanced Hemodynamics Last 24 Hrs  CVP(mm Hg): 6 (21 Mar 2023 21:00) (-1 - 19)  CVP(cm H2O): --  CO: 4.2 (21 Mar 2023 13:00) (3.9 - 6.9)  CI: 2.1 (21 Mar 2023 13:00) (1.9 - 3.4)  PA: 24/15 (21 Mar 2023 21:00) (21/12 - 35/26)  PA(mean): 20 (21 Mar 2023 21:00) (16 - 29)  PCWP: --  SVR: 1598 (21 Mar 2023 13:00) (880 - 1679)  SVRI: 3196 (21 Mar 2023 13:00) (1786 - 3448)  PVR: --  PVRI: --, ABG - ( 21 Mar 2023 18:48 )  pH, Arterial: 7.41  pH, Blood: x     /  pCO2: 35    /  pO2: 84    / HCO3: 22    / Base Excess: -1.9  /  SaO2: 99.1              Mode: AC/ CMV (Assist Control/ Continuous Mandatory Ventilation)  RR (machine): 14  TV (machine): 650  FiO2: 50  PEEP: 5  ITime: 1  MAP: 9  PIP: 22    Intake and output was reviewed and the fluid balance was calculated  Daily     Daily   I&O's Summary    20 Mar 2023 07:01  -  21 Mar 2023 07:00  --------------------------------------------------------  IN: 2158.6 mL / OUT: 1230 mL / NET: 928.6 mL    21 Mar 2023 07:01  -  21 Mar 2023 21:40  --------------------------------------------------------  IN: 5959.6 mL / OUT: 1835 mL / NET: 4124.6 mL        All lines and drain sites were assessed    Neuro: Pupils 2-3 mm (Reactive). Sedated with propofol.   Neck: No JVD.  CVS: S1, S2, No S3.  Lungs: Good air entry bilaterally.  Abd: Soft. No tenderness. + Bowel sounds.  Vascular: + DP/PT.  Extremities: No edema.  Lymphatic: Normal.  Skin: No abnormalities.      labs  CBC Full  -  ( 21 Mar 2023 18:49 )  WBC Count : 14.03 K/uL  RBC Count : 3.94 M/uL  Hemoglobin : 12.1 g/dL  Hematocrit : 34.0 %  Platelet Count - Automated : 74 K/uL  Mean Cell Volume : 86.3 fl  Mean Cell Hemoglobin : 30.7 pg  Mean Cell Hemoglobin Concentration : 35.6 gm/dL  Auto Neutrophil # : x  Auto Lymphocyte # : x  Auto Monocyte # : x  Auto Eosinophil # : x  Auto Basophil # : x  Auto Neutrophil % : x  Auto Lymphocyte % : x  Auto Monocyte % : x  Auto Eosinophil % : x  Auto Basophil % : x    03-21    143  |  111<H>  |  21  ----------------------------<  83  6.5<HH>   |  22  |  2.14<H>    Ca    7.7<L>      21 Mar 2023 18:49  Phos  4.9     03-21  Mg     2.3     03-21    TPro  4.0<L>  /  Alb  2.3<L>  /  TBili  0.5  /  DBili  x   /  AST  393<H>  /  ALT  79<H>  /  AlkPhos  42  03-21    PT/INR - ( 21 Mar 2023 18:49 )   PT: 14.5 sec;   INR: 1.22          PTT - ( 21 Mar 2023 18:49 )  PTT:34.7 sec  The current medications were reviewed   MEDICATIONS  (STANDING):  albuterol/ipratropium for Nebulization. 3 milliLiter(s) Nebulizer once  aspirin  chewable 81 milliGRAM(s) Enteral Tube daily  calcium gluconate IVPB 2 Gram(s) IV Intermittent once  ceFAZolin   IVPB 2000 milliGRAM(s) IV Intermittent every 12 hours  chlorhexidine 0.12% Liquid 15 milliLiter(s) Oral Mucosa every 12 hours  chlorhexidine 2% Cloths 1 Application(s) Topical daily  CRRT Treatment    <Continuous>  dextrose 10%. 250 milliLiter(s) (500 mL/Hr) IV Continuous <Continuous>  dextrose 50% Injectable 50 milliLiter(s) IV Push every 15 minutes  dextrose 50% Injectable 25 milliLiter(s) IV Push every 15 minutes  furosemide Infusion 15 mG/Hr (7.5 mL/Hr) IV Continuous <Continuous>  heparin   Injectable 5000 Unit(s) SubCutaneous every 8 hours  insulin regular Infusion 1 Unit(s)/Hr (1 mL/Hr) IV Continuous <Continuous>  lacosamide IVPB 100 milliGRAM(s) IV Intermittent every 12 hours  milrinone Infusion 0.25 MICROgram(s)/kG/Min (5.78 mL/Hr) IV Continuous <Continuous>  pantoprazole  Injectable 40 milliGRAM(s) IV Push daily  propofol Infusion 5 MICROgram(s)/kG/Min (2.31 mL/Hr) IV Continuous <Continuous>  PureFlow Dialysate RFP-400 (K 2 / Ca 3) 5000 milliLiter(s) (3000 mL/Hr) CRRT <Continuous>  sodium chloride 0.9%. 1000 milliLiter(s) (10 mL/Hr) IV Continuous <Continuous>  valproate sodium  IVPB 750 milliGRAM(s) IV Intermittent every 12 hours  valproate sodium  IVPB 1000 milliGRAM(s) IV Intermittent once  vasopressin Infusion 0.03 Unit(s)/Min (4.5 mL/Hr) IV Continuous <Continuous>    MEDICATIONS  (PRN):          53 year old  Male with expanding aortic arch and descending aortic aneurysm   S/P AVR  Aortic arch replacement  S/P CABG (SVG to RCA).  S/P Aorto left axillary by pass.  S/P TEVAR to descending aorta.  Hyperkalemia  Metabolic acidosis  Renal Failure.  Acute posthemorrhagic anemia.   Seizure disorder.  Hemodynamically stable.  Good oxygenation.  Tolerating CVVHD.      My plan includes :  EEG  Continue full vent support and inhaled nitric oxide.  Continue CVVHD.  Statin Rx.  IV milrinone and vasopressin.  Hyperkalemia Rx.   Bronchodilator Rx.  IV lacosamide and valproate as recommended by epilepsy team.   Close hemodynamic, ventilatory and drain monitoring and management  Monitor for arrhythmias and monitor parameters for organ perfusion  Monitor neurologic status  Monitor renal function.  Head of the bed should remain elevated to 45 deg .   Chest PT and IS will be encouraged  Monitor adequacy of oxygenation and ventilation and attempt to wean oxygen  Nutritional goals will be met using po eventually , ensure adequate caloric intake and monitor the same  Stress ulcer and VTE prophylaxis will be achieved    Glycemic control is satisfactory  Electrolytes have been repleted as necessary and wound care has been carried out. Pain control has been achieved.   Aggressive physical therapy and early mobility and ambulation goals will be met   The family was updated about the course and plan  CRITICAL CARE TIME SPENT in evaluation and management, reassessments, review and interpretation of labs and x-rays, ventilator and hemodynamic management, formulating a plan and coordinating care: ___60____ MIN.  Time does not include procedural time.  CTICU ATTENDING     					    José Miguel Torres MD

## 2023-03-22 LAB
ALBUMIN SERPL ELPH-MCNC: 2.3 G/DL — LOW (ref 3.3–5)
ALBUMIN SERPL ELPH-MCNC: 2.3 G/DL — LOW (ref 3.3–5)
ALBUMIN SERPL ELPH-MCNC: 2.4 G/DL — LOW (ref 3.3–5)
ALBUMIN SERPL ELPH-MCNC: 2.7 G/DL — LOW (ref 3.3–5)
ALP SERPL-CCNC: 40 U/L — SIGNIFICANT CHANGE UP (ref 40–120)
ALP SERPL-CCNC: 42 U/L — SIGNIFICANT CHANGE UP (ref 40–120)
ALP SERPL-CCNC: 49 U/L — SIGNIFICANT CHANGE UP (ref 40–120)
ALP SERPL-CCNC: 54 U/L — SIGNIFICANT CHANGE UP (ref 40–120)
ALT FLD-CCNC: 45 U/L — SIGNIFICANT CHANGE UP (ref 10–45)
ALT FLD-CCNC: 55 U/L — HIGH (ref 10–45)
ALT FLD-CCNC: 62 U/L — HIGH (ref 10–45)
ALT FLD-CCNC: 69 U/L — HIGH (ref 10–45)
ANION GAP SERPL CALC-SCNC: 10 MMOL/L — SIGNIFICANT CHANGE UP (ref 5–17)
ANION GAP SERPL CALC-SCNC: 6 MMOL/L — SIGNIFICANT CHANGE UP (ref 5–17)
ANION GAP SERPL CALC-SCNC: 7 MMOL/L — SIGNIFICANT CHANGE UP (ref 5–17)
ANION GAP SERPL CALC-SCNC: 7 MMOL/L — SIGNIFICANT CHANGE UP (ref 5–17)
APTT BLD: 36.7 SEC — HIGH (ref 27.5–35.5)
APTT BLD: 37 SEC — HIGH (ref 27.5–35.5)
APTT BLD: 38.6 SEC — HIGH (ref 27.5–35.5)
AST SERPL-CCNC: 312 U/L — HIGH (ref 10–40)
AST SERPL-CCNC: 349 U/L — HIGH (ref 10–40)
AST SERPL-CCNC: 351 U/L — HIGH (ref 10–40)
AST SERPL-CCNC: 356 U/L — HIGH (ref 10–40)
BASE EXCESS BLDV CALC-SCNC: 0.7 MMOL/L — SIGNIFICANT CHANGE UP (ref -2–3)
BASE EXCESS BLDV CALC-SCNC: 2 MMOL/L — SIGNIFICANT CHANGE UP (ref -2–3)
BASE EXCESS BLDV CALC-SCNC: 2 MMOL/L — SIGNIFICANT CHANGE UP (ref -2–3)
BASE EXCESS BLDV CALC-SCNC: 3.3 MMOL/L — HIGH (ref -2–3)
BILIRUB SERPL-MCNC: 0.6 MG/DL — SIGNIFICANT CHANGE UP (ref 0.2–1.2)
BILIRUB SERPL-MCNC: 0.6 MG/DL — SIGNIFICANT CHANGE UP (ref 0.2–1.2)
BILIRUB SERPL-MCNC: 0.7 MG/DL — SIGNIFICANT CHANGE UP (ref 0.2–1.2)
BILIRUB SERPL-MCNC: 0.7 MG/DL — SIGNIFICANT CHANGE UP (ref 0.2–1.2)
BUN SERPL-MCNC: 19 MG/DL — SIGNIFICANT CHANGE UP (ref 7–23)
BUN SERPL-MCNC: 19 MG/DL — SIGNIFICANT CHANGE UP (ref 7–23)
BUN SERPL-MCNC: 20 MG/DL — SIGNIFICANT CHANGE UP (ref 7–23)
BUN SERPL-MCNC: 20 MG/DL — SIGNIFICANT CHANGE UP (ref 7–23)
CALCIUM SERPL-MCNC: 7.8 MG/DL — LOW (ref 8.4–10.5)
CALCIUM SERPL-MCNC: 7.8 MG/DL — LOW (ref 8.4–10.5)
CALCIUM SERPL-MCNC: 7.9 MG/DL — LOW (ref 8.4–10.5)
CALCIUM SERPL-MCNC: 8 MG/DL — LOW (ref 8.4–10.5)
CHLORIDE SERPL-SCNC: 104 MMOL/L — SIGNIFICANT CHANGE UP (ref 96–108)
CHLORIDE SERPL-SCNC: 105 MMOL/L — SIGNIFICANT CHANGE UP (ref 96–108)
CHLORIDE SERPL-SCNC: 105 MMOL/L — SIGNIFICANT CHANGE UP (ref 96–108)
CHLORIDE SERPL-SCNC: 106 MMOL/L — SIGNIFICANT CHANGE UP (ref 96–108)
CO2 BLDV-SCNC: 26.6 MMOL/L — HIGH (ref 22–26)
CO2 BLDV-SCNC: 27.7 MMOL/L — HIGH (ref 22–26)
CO2 BLDV-SCNC: 27.7 MMOL/L — HIGH (ref 22–26)
CO2 BLDV-SCNC: 29.1 MMOL/L — HIGH (ref 22–26)
CO2 SERPL-SCNC: 25 MMOL/L — SIGNIFICANT CHANGE UP (ref 22–31)
CO2 SERPL-SCNC: 27 MMOL/L — SIGNIFICANT CHANGE UP (ref 22–31)
CREAT SERPL-MCNC: 1.81 MG/DL — HIGH (ref 0.5–1.3)
CREAT SERPL-MCNC: 1.84 MG/DL — HIGH (ref 0.5–1.3)
CREAT SERPL-MCNC: 1.88 MG/DL — HIGH (ref 0.5–1.3)
CREAT SERPL-MCNC: 1.91 MG/DL — HIGH (ref 0.5–1.3)
EGFR: 41 ML/MIN/1.73M2 — LOW
EGFR: 42 ML/MIN/1.73M2 — LOW
EGFR: 43 ML/MIN/1.73M2 — LOW
EGFR: 44 ML/MIN/1.73M2 — LOW
GAS PNL BLDA: SIGNIFICANT CHANGE UP
GAS PNL BLDV: SIGNIFICANT CHANGE UP
GAS PNL BLDV: SIGNIFICANT CHANGE UP
GLUCOSE BLDC GLUCOMTR-MCNC: 71 MG/DL — SIGNIFICANT CHANGE UP (ref 70–99)
GLUCOSE SERPL-MCNC: 76 MG/DL — SIGNIFICANT CHANGE UP (ref 70–99)
GLUCOSE SERPL-MCNC: 78 MG/DL — SIGNIFICANT CHANGE UP (ref 70–99)
GLUCOSE SERPL-MCNC: 82 MG/DL — SIGNIFICANT CHANGE UP (ref 70–99)
GLUCOSE SERPL-MCNC: 85 MG/DL — SIGNIFICANT CHANGE UP (ref 70–99)
HCO3 BLDV-SCNC: 25 MMOL/L — SIGNIFICANT CHANGE UP (ref 22–29)
HCO3 BLDV-SCNC: 26 MMOL/L — SIGNIFICANT CHANGE UP (ref 22–29)
HCO3 BLDV-SCNC: 26 MMOL/L — SIGNIFICANT CHANGE UP (ref 22–29)
HCO3 BLDV-SCNC: 28 MMOL/L — SIGNIFICANT CHANGE UP (ref 22–29)
HCT VFR BLD CALC: 27.9 % — LOW (ref 39–50)
HCT VFR BLD CALC: 28.7 % — LOW (ref 39–50)
HCT VFR BLD CALC: 29 % — LOW (ref 39–50)
HGB BLD-MCNC: 10.1 G/DL — LOW (ref 13–17)
HGB BLD-MCNC: 10.3 G/DL — LOW (ref 13–17)
HGB BLD-MCNC: 10.6 G/DL — LOW (ref 13–17)
INR BLD: 1.38 — HIGH (ref 0.88–1.16)
INR BLD: 1.53 — HIGH (ref 0.88–1.16)
INR BLD: 1.55 — HIGH (ref 0.88–1.16)
INR BLD: 1.56 — HIGH (ref 0.88–1.16)
LACTATE SERPL-SCNC: 1.6 MMOL/L — SIGNIFICANT CHANGE UP (ref 0.5–2)
LACTATE SERPL-SCNC: 1.8 MMOL/L — SIGNIFICANT CHANGE UP (ref 0.5–2)
LACTATE SERPL-SCNC: 1.8 MMOL/L — SIGNIFICANT CHANGE UP (ref 0.5–2)
LACTATE SERPL-SCNC: 2.7 MMOL/L — HIGH (ref 0.5–2)
LACTATE SERPL-SCNC: 3.5 MMOL/L — HIGH (ref 0.5–2)
MAGNESIUM SERPL-MCNC: 1.8 MG/DL — SIGNIFICANT CHANGE UP (ref 1.6–2.6)
MAGNESIUM SERPL-MCNC: 1.9 MG/DL — SIGNIFICANT CHANGE UP (ref 1.6–2.6)
MAGNESIUM SERPL-MCNC: 1.9 MG/DL — SIGNIFICANT CHANGE UP (ref 1.6–2.6)
MAGNESIUM SERPL-MCNC: 2 MG/DL — SIGNIFICANT CHANGE UP (ref 1.6–2.6)
MCHC RBC-ENTMCNC: 31.1 PG — SIGNIFICANT CHANGE UP (ref 27–34)
MCHC RBC-ENTMCNC: 31.2 PG — SIGNIFICANT CHANGE UP (ref 27–34)
MCHC RBC-ENTMCNC: 31.4 PG — SIGNIFICANT CHANGE UP (ref 27–34)
MCHC RBC-ENTMCNC: 35.9 GM/DL — SIGNIFICANT CHANGE UP (ref 32–36)
MCHC RBC-ENTMCNC: 36.2 GM/DL — HIGH (ref 32–36)
MCHC RBC-ENTMCNC: 36.6 GM/DL — HIGH (ref 32–36)
MCV RBC AUTO: 85.8 FL — SIGNIFICANT CHANGE UP (ref 80–100)
MCV RBC AUTO: 86.1 FL — SIGNIFICANT CHANGE UP (ref 80–100)
MCV RBC AUTO: 86.7 FL — SIGNIFICANT CHANGE UP (ref 80–100)
NRBC # BLD: 0 /100 WBCS — SIGNIFICANT CHANGE UP (ref 0–0)
PCO2 BLDV: 40 MMHG — LOW (ref 42–55)
PCO2 BLDV: 41 MMHG — LOW (ref 42–55)
PH BLDV: 7.41 — SIGNIFICANT CHANGE UP (ref 7.32–7.43)
PH BLDV: 7.43 — SIGNIFICANT CHANGE UP (ref 7.32–7.43)
PH BLDV: 7.43 — SIGNIFICANT CHANGE UP (ref 7.32–7.43)
PH BLDV: 7.44 — HIGH (ref 7.32–7.43)
PHOSPHATE SERPL-MCNC: 4 MG/DL — SIGNIFICANT CHANGE UP (ref 2.5–4.5)
PHOSPHATE SERPL-MCNC: 4.2 MG/DL — SIGNIFICANT CHANGE UP (ref 2.5–4.5)
PHOSPHATE SERPL-MCNC: 4.2 MG/DL — SIGNIFICANT CHANGE UP (ref 2.5–4.5)
PHOSPHATE SERPL-MCNC: 4.4 MG/DL — SIGNIFICANT CHANGE UP (ref 2.5–4.5)
PLATELET # BLD AUTO: 41 K/UL — LOW (ref 150–400)
PLATELET # BLD AUTO: 44 K/UL — LOW (ref 150–400)
PLATELET # BLD AUTO: 53 K/UL — LOW (ref 150–400)
PO2 BLDV: 33 MMHG — SIGNIFICANT CHANGE UP (ref 25–45)
PO2 BLDV: 33 MMHG — SIGNIFICANT CHANGE UP (ref 25–45)
PO2 BLDV: 38 MMHG — SIGNIFICANT CHANGE UP (ref 25–45)
PO2 BLDV: <33 MMHG — SIGNIFICANT CHANGE UP (ref 25–45)
POTASSIUM SERPL-MCNC: 4.3 MMOL/L — SIGNIFICANT CHANGE UP (ref 3.5–5.3)
POTASSIUM SERPL-MCNC: 4.5 MMOL/L — SIGNIFICANT CHANGE UP (ref 3.5–5.3)
POTASSIUM SERPL-MCNC: 5.1 MMOL/L — SIGNIFICANT CHANGE UP (ref 3.5–5.3)
POTASSIUM SERPL-MCNC: 5.2 MMOL/L — SIGNIFICANT CHANGE UP (ref 3.5–5.3)
POTASSIUM SERPL-SCNC: 4.3 MMOL/L — SIGNIFICANT CHANGE UP (ref 3.5–5.3)
POTASSIUM SERPL-SCNC: 4.5 MMOL/L — SIGNIFICANT CHANGE UP (ref 3.5–5.3)
POTASSIUM SERPL-SCNC: 5.1 MMOL/L — SIGNIFICANT CHANGE UP (ref 3.5–5.3)
POTASSIUM SERPL-SCNC: 5.2 MMOL/L — SIGNIFICANT CHANGE UP (ref 3.5–5.3)
PROT SERPL-MCNC: 4.2 G/DL — LOW (ref 6–8.3)
PROT SERPL-MCNC: 4.2 G/DL — LOW (ref 6–8.3)
PROT SERPL-MCNC: 4.4 G/DL — LOW (ref 6–8.3)
PROT SERPL-MCNC: 4.4 G/DL — LOW (ref 6–8.3)
PROTHROM AB SERPL-ACNC: 16.5 SEC — HIGH (ref 10.5–13.4)
PROTHROM AB SERPL-ACNC: 18.3 SEC — HIGH (ref 10.5–13.4)
PROTHROM AB SERPL-ACNC: 18.5 SEC — HIGH (ref 10.5–13.4)
PROTHROM AB SERPL-ACNC: 18.6 SEC — HIGH (ref 10.5–13.4)
RBC # BLD: 3.24 M/UL — LOW (ref 4.2–5.8)
RBC # BLD: 3.31 M/UL — LOW (ref 4.2–5.8)
RBC # BLD: 3.38 M/UL — LOW (ref 4.2–5.8)
RBC # FLD: 15.9 % — HIGH (ref 10.3–14.5)
RBC # FLD: 16 % — HIGH (ref 10.3–14.5)
RBC # FLD: 16.2 % — HIGH (ref 10.3–14.5)
SAO2 % BLDV: 44.1 % — LOW (ref 67–88)
SAO2 % BLDV: 53.3 % — LOW (ref 67–88)
SAO2 % BLDV: 53.8 % — LOW (ref 67–88)
SAO2 % BLDV: 64.2 % — LOW (ref 67–88)
SODIUM SERPL-SCNC: 136 MMOL/L — SIGNIFICANT CHANGE UP (ref 135–145)
SODIUM SERPL-SCNC: 137 MMOL/L — SIGNIFICANT CHANGE UP (ref 135–145)
SODIUM SERPL-SCNC: 138 MMOL/L — SIGNIFICANT CHANGE UP (ref 135–145)
SODIUM SERPL-SCNC: 141 MMOL/L — SIGNIFICANT CHANGE UP (ref 135–145)
WBC # BLD: 13.37 K/UL — HIGH (ref 3.8–10.5)
WBC # BLD: 14.34 K/UL — HIGH (ref 3.8–10.5)
WBC # BLD: 15.73 K/UL — HIGH (ref 3.8–10.5)
WBC # FLD AUTO: 13.37 K/UL — HIGH (ref 3.8–10.5)
WBC # FLD AUTO: 14.34 K/UL — HIGH (ref 3.8–10.5)
WBC # FLD AUTO: 15.73 K/UL — HIGH (ref 3.8–10.5)

## 2023-03-22 PROCEDURE — 31645 BRNCHSC W/THER ASPIR 1ST: CPT

## 2023-03-22 PROCEDURE — 99292 CRITICAL CARE ADDL 30 MIN: CPT | Mod: 25

## 2023-03-22 RX ORDER — MILRINONE LACTATE 1 MG/ML
0.25 INJECTION, SOLUTION INTRAVENOUS
Qty: 20 | Refills: 0 | Status: DISCONTINUED | OUTPATIENT
Start: 2023-03-22 | End: 2023-03-24

## 2023-03-22 RX ORDER — ALBUMIN HUMAN 25 %
50 VIAL (ML) INTRAVENOUS
Refills: 0 | Status: COMPLETED | OUTPATIENT
Start: 2023-03-22 | End: 2023-03-22

## 2023-03-22 RX ORDER — SODIUM CHLORIDE 9 MG/ML
1000 INJECTION INTRAMUSCULAR; INTRAVENOUS; SUBCUTANEOUS ONCE
Refills: 0 | Status: COMPLETED | OUTPATIENT
Start: 2023-03-22 | End: 2023-03-21

## 2023-03-22 RX ORDER — SODIUM CHLORIDE 9 MG/ML
1000 INJECTION, SOLUTION INTRAVENOUS
Refills: 0 | Status: DISCONTINUED | OUTPATIENT
Start: 2023-03-22 | End: 2023-04-08

## 2023-03-22 RX ORDER — DEXTROSE 10 % IN WATER 10 %
50 INTRAVENOUS SOLUTION INTRAVENOUS
Refills: 0 | Status: DISCONTINUED | OUTPATIENT
Start: 2023-03-22 | End: 2023-03-25

## 2023-03-22 RX ORDER — INSULIN LISPRO 100/ML
VIAL (ML) SUBCUTANEOUS AT BEDTIME
Refills: 0 | Status: DISCONTINUED | OUTPATIENT
Start: 2023-03-22 | End: 2023-03-24

## 2023-03-22 RX ORDER — GLUCAGON INJECTION, SOLUTION 0.5 MG/.1ML
1 INJECTION, SOLUTION SUBCUTANEOUS ONCE
Refills: 0 | Status: DISCONTINUED | OUTPATIENT
Start: 2023-03-22 | End: 2023-04-08

## 2023-03-22 RX ORDER — DEXTROSE 50 % IN WATER 50 %
15 SYRINGE (ML) INTRAVENOUS ONCE
Refills: 0 | Status: DISCONTINUED | OUTPATIENT
Start: 2023-03-22 | End: 2023-04-08

## 2023-03-22 RX ORDER — INSULIN LISPRO 100/ML
VIAL (ML) SUBCUTANEOUS
Refills: 0 | Status: DISCONTINUED | OUTPATIENT
Start: 2023-03-22 | End: 2023-04-08

## 2023-03-22 RX ADMIN — CHLORHEXIDINE GLUCONATE 1 APPLICATION(S): 213 SOLUTION TOPICAL at 13:59

## 2023-03-22 RX ADMIN — LACOSAMIDE 120 MILLIGRAM(S): 50 TABLET ORAL at 19:39

## 2023-03-22 RX ADMIN — Medication 100 MILLIGRAM(S): at 04:07

## 2023-03-22 RX ADMIN — Medication 100 MILLIGRAM(S): at 16:30

## 2023-03-22 RX ADMIN — CHLORHEXIDINE GLUCONATE 15 MILLILITER(S): 213 SOLUTION TOPICAL at 18:51

## 2023-03-22 RX ADMIN — Medication 10 MILLILITER(S): at 23:47

## 2023-03-22 RX ADMIN — Medication 50 MILLILITER(S): at 02:11

## 2023-03-22 RX ADMIN — MILRINONE LACTATE 5.78 MICROGRAM(S)/KG/MIN: 1 INJECTION, SOLUTION INTRAVENOUS at 16:30

## 2023-03-22 RX ADMIN — LACOSAMIDE 120 MILLIGRAM(S): 50 TABLET ORAL at 07:00

## 2023-03-22 RX ADMIN — Medication 81 MILLIGRAM(S): at 12:44

## 2023-03-22 RX ADMIN — PROPOFOL 2.31 MICROGRAM(S)/KG/MIN: 10 INJECTION, EMULSION INTRAVENOUS at 12:35

## 2023-03-22 RX ADMIN — VASOPRESSIN 4.5 UNIT(S)/MIN: 20 INJECTION INTRAVENOUS at 22:12

## 2023-03-22 RX ADMIN — Medication 50 MILLILITER(S): at 00:56

## 2023-03-22 RX ADMIN — Medication 60 MILLIGRAM(S): at 07:00

## 2023-03-22 RX ADMIN — PANTOPRAZOLE SODIUM 40 MILLIGRAM(S): 20 TABLET, DELAYED RELEASE ORAL at 12:44

## 2023-03-22 RX ADMIN — PROPOFOL 2.31 MICROGRAM(S)/KG/MIN: 10 INJECTION, EMULSION INTRAVENOUS at 22:11

## 2023-03-22 RX ADMIN — Medication 57.5 MILLIGRAM(S): at 18:50

## 2023-03-22 RX ADMIN — CHLORHEXIDINE GLUCONATE 15 MILLILITER(S): 213 SOLUTION TOPICAL at 06:29

## 2023-03-22 NOTE — PROGRESS NOTE ADULT - SUBJECTIVE AND OBJECTIVE BOX
Hemoglobin: 10.3 g/dL (03-22-23 @ 16:29)  Phosphorus Level, Serum: 4.2 mg/dL (03-22-23 @ 16:29)  Hemoglobin: 10.1 g/dL (03-22-23 @ 10:06)  Phosphorus Level, Serum: 4.2 mg/dL (03-22-23 @ 10:06)    Albumin, Serum: 2.3 g/dL (03-22-23 @ 16:29)  Albumin, Serum: 2.4 g/dL (03-22-23 @ 10:06)    T(C): 36.7 (03-22-23 @ 14:00), Max: 36.8 (03-22-23 @ 10:00)  HR: 95 (03-22-23 @ 16:00) (80 - 99)  BP: 128/58 (03-22-23 @ 16:00)  RR: 14 (03-22-23 @ 16:00) (14 - 16)  SpO2: 95% (03-22-23 @ 16:00) (95% - 100%)      CRRT Treatment:   Modality: CVVHD, Filter: NxStage CAR-505, Target Blood Flow: 300 mL/Min  Target Fluid Balance: Titrate to net NEGATIVE fluid balance, 80 mL/Hr (03-22-23 @ 09:27) [Active]  CRRT Treatment:   Modality: CVVHD, Filter: NxStage CAR-505, Target Blood Flow: 300 mL/Min  Target Fluid Balance: No fluid removal (03-21-23 @ 17:51) [Discontinued]      Seen on CVVHD. Tolerating well. Continue as above

## 2023-03-22 NOTE — PROGRESS NOTE ADULT - ASSESSMENT
54 yo M w/ HTN, seizure disorder and no underlying CKD who is s/p scheduled replacement of aortic arch and TEVAR on 3/20. Prolonger surgery with significant amount of blood products and fluid recieved intra-op. Post-op pt with severe acidosis. Nephrology consulted for HD    #Non-oliguric TREY  BCr 1.2, eGFR 67 (3/9)  Electrolytes noted with Na 150, bicarb 14  UA w/ trace proteinuria and large blood w/o RBCs. [id number] (3/21)  Etiology likely iATN given intra-op pt requiring 7 units pRBC and currently in shock  C/w CVVHD for hyperkalemia and vol removal. Will change goal to net negative 80 ml/hr and target of net negative 2L in the next 24 hours. Will continue with 2K bath and monitor electrolytes. Will continue with effluent rate 3000 ml/hr to maximize K clearance  In case CVVHD system clots, will restart w/ heparin drip. Goal APTT 1.5-2 x normal. Discussed w/ pharmacy  Check phos daily  Repeat CK  Strict I&Os  Can dc lasix drip  BMP per icu protocol         52 yo M w/ HTN, seizure disorder and no underlying CKD who is s/p scheduled replacement of aortic arch and TEVAR on 3/20. Prolonger surgery with significant amount of blood products and fluid recieved intra-op. Post-op pt with severe acidosis. Started on CVVHD 3/21 for refractory acidosis, tolerating CVVHD without any complications    #Seen on CVVHD    #Non-oliguric iATN due to intra-op hemodynamic changes (pt requiring 7 units pRBC intra-op and post-op pt in shock. Shock now improving)  BCr 1.2, eGFR 67 (3/9)  UA w/ trace proteinuria and large blood w/o RBCs. [id number] (3/21)  C/w CVVHD for hyperkalemia and vol removal. Will change goal to net negative 80 ml/hr and target of net negative 2L in the next 24 hours. Will continue with 2K bath and monitor electrolytes. Will continue with dialysate rate 3000 ml/hr to maximize K clearance  In case CVVHD system clots, will restart w/ heparin drip. Goal will be APTT 1.5-2 x normal. Discussed w/ pharmacy  Met acidosis resolved. Lactate downtrending  Check phos daily. Unclear etiology of initial phos of 0.5, may be related to transfusions. Phos improved with Phoxillum dialysate, now pt on Pureflow 2K dialysate for hyperkalemia  Repeat CK  Strict I&Os  Can dc lasix drip  BMP per icu protocol    Plan discussed with CTICU team

## 2023-03-22 NOTE — PROGRESS NOTE ADULT - SUBJECTIVE AND OBJECTIVE BOX
Patient is a 53y Male seen and evaluated at bedside.       Meds:    aspirin  chewable 81 daily  ceFAZolin   IVPB 2000 every 12 hours  chlorhexidine 0.12% Liquid 15 every 12 hours  chlorhexidine 2% Cloths 1 daily  CRRT Treatment  <Continuous>  dextrose 10%. 250 <Continuous>  dextrose 50% Injectable 50 every 15 minutes  dextrose 50% Injectable 25 every 15 minutes  furosemide Infusion 15 <Continuous>  heparin   Injectable 5000 every 8 hours  insulin regular Infusion 1 <Continuous>  lacosamide IVPB 100 every 12 hours  milrinone Infusion 0.25 <Continuous>  pantoprazole  Injectable 40 daily  propofol Infusion 5 <Continuous>  PureFlow Dialysate RFP-400 (K 2 / Ca 3) 5000 <Continuous>  sodium chloride 0.9%. 1000 <Continuous>  valproate sodium  IVPB 750 every 12 hours  vasopressin Infusion 0.03 <Continuous>      T(C): , Max: 36.7 (23 @ 07:00)  T(F): , Max: 98.1 (23 @ 07:00)  HR: 90 (23 @ 08:00)  BP: --  BP(mean): --  RR: 14 (23 @ 08:00)  SpO2: 96% (23 @ 08:00)  Wt(kg): --     @ 07:01  -   @ 07:00  --------------------------------------------------------  IN: 7689.2 mL / OUT: 2698 mL / NET: 4991.2 mL     @ 07:01  -   @ 08:43  --------------------------------------------------------  IN: 39.6 mL / OUT: 124 mL / NET: -84.4 mL          Review of Systems:  ROS negative except as per HPI      PHYSICAL EXAM:        LABS:                        10.6   15.73 )-----------( 53       ( 22 Mar 2023 03:17 )             29.0     -    141  |  106  |  19  ----------------------------<  85  5.2   |  25  |  1.88<H>    Ca    7.9<L>      22 Mar 2023 03:17  Phos  4.4       Mg     2.0         TPro  4.4<L>  /  Alb  2.7<L>  /  TBili  0.6  /  DBili  x   /  AST  351<H>  /  ALT  69<H>  /  AlkPhos  40  -22      PT/INR - ( 22 Mar 2023 03:17 )   PT: 16.5 sec;   INR: 1.38          PTT - ( 22 Mar 2023 03:17 )  PTT:38.6 sec  Urinalysis Basic - ( 21 Mar 2023 01:22 )    Color: Yellow / Appearance: Clear / S.020 / pH: x  Gluc: x / Ketone: NEGATIVE  / Bili: Negative / Urobili: 0.2 E.U./dL   Blood: x / Protein: Trace mg/dL / Nitrite: NEGATIVE   Leuk Esterase: NEGATIVE / RBC: < 5 /HPF / WBC 5-10 /HPF   Sq Epi: x / Non Sq Epi: 0-5 /HPF / Bacteria: Present /HPF            RADIOLOGY & ADDITIONAL STUDIES:           Patient is a 53y Male seen and evaluated at bedside. NAD, remains intubated, sedated, on milrinone. CVVHD running overnight w/o complications      Meds:    aspirin  chewable 81 daily  ceFAZolin   IVPB 2000 every 12 hours  chlorhexidine 0.12% Liquid 15 every 12 hours  chlorhexidine 2% Cloths 1 daily  CRRT Treatment  <Continuous>  dextrose 10%. 250 <Continuous>  dextrose 50% Injectable 50 every 15 minutes  dextrose 50% Injectable 25 every 15 minutes  furosemide Infusion 15 <Continuous>  heparin   Injectable 5000 every 8 hours  insulin regular Infusion 1 <Continuous>  lacosamide IVPB 100 every 12 hours  milrinone Infusion 0.25 <Continuous>  pantoprazole  Injectable 40 daily  propofol Infusion 5 <Continuous>  PureFlow Dialysate RFP-400 (K 2 / Ca 3) 5000 <Continuous>  sodium chloride 0.9%. 1000 <Continuous>  valproate sodium  IVPB 750 every 12 hours  vasopressin Infusion 0.03 <Continuous>      T(C): , Max: 36.7 (23 @ 07:00)  T(F): , Max: 98.1 (23 @ 07:00)  HR: 90 (23 @ 08:00)  BP: --  BP(mean): --  RR: 14 (23 @ 08:00)  SpO2: 96% (23 @ 08:00)  Wt(kg): --     @ 07:  -   @ 07:00  --------------------------------------------------------  IN: 7689.2 mL / OUT: 2698 mL / NET: 4991.2 mL     @ 07:01  -   @ 08:43  --------------------------------------------------------  IN: 39.6 mL / OUT: 124 mL / NET: -84.4 mL          Review of Systems:  ROS negative except as per HPI      PHYSICAL EXAM:  GENERAL: Intubated, sedated  CHEST/LUNG: Bibasilar rhochi, on MV  HEART: Regular rate and rhythm, no murmur  ABDOMEN: Soft, nondistended  : Cantrell in place  EXTREMITIES: no pedal edema  Neurology: Sedated  ACCESS: Lt fem temp HD cath        LABS:                        10.6   15.73 )-----------( 53       ( 22 Mar 2023 03:17 )             29.0     03-    141  |  106  |  19  ----------------------------<  85  5.2   |  25  |  1.88<H>    Ca    7.9<L>      22 Mar 2023 03:17  Phos  4.4     -  Mg     2.0         TPro  4.4<L>  /  Alb  2.7<L>  /  TBili  0.6  /  DBili  x   /  AST  351<H>  /  ALT  69<H>  /  AlkPhos  40  03-22      PT/INR - ( 22 Mar 2023 03:17 )   PT: 16.5 sec;   INR: 1.38          PTT - ( 22 Mar 2023 03:17 )  PTT:38.6 sec  Urinalysis Basic - ( 21 Mar 2023 01:22 )    Color: Yellow / Appearance: Clear / S.020 / pH: x  Gluc: x / Ketone: NEGATIVE  / Bili: Negative / Urobili: 0.2 E.U./dL   Blood: x / Protein: Trace mg/dL / Nitrite: NEGATIVE   Leuk Esterase: NEGATIVE / RBC: < 5 /HPF / WBC 5-10 /HPF   Sq Epi: x / Non Sq Epi: 0-5 /HPF / Bacteria: Present /HPF            RADIOLOGY & ADDITIONAL STUDIES:

## 2023-03-22 NOTE — PROGRESS NOTE ADULT - SUBJECTIVE AND OBJECTIVE BOX
CTICU  CRITICAL  CARE  attending     Hand off received 					   Pertinent clinical, laboratory, radiographic, hemodynamic, echocardiographic, respiratory data, microbiologic data and chart were reviewed and analyzed frequently throughout the course of the day  Patient seen and examined with CTS/ SH attending at bedside  Pt is a 53yr old male with PMH HTN, type A dissection sp Dacron grafts and AV resuspension (2013), CAD sp CABG x 1 ([facility], 5/2013, SVG-RCA), seizures, admitted for surgical mgmt of progression of aneurysmal disease. Patient underwent replacement of transverse aortic arch, second stage thoracic endovascular aortic repair, aorto-axillary bypass, AV replacement (bio 23mm), and CABG x 1 (SVG-RCA) EF 75% with Dr. Pierre 3/20. Patient received 7 units pRBC, 6 FFP, 4 plt, 15 cryo, 1000 FEIBA, and uo 1300cc. Started on lasix infusion and phenylephrine, bicarb, Armaan, levo and vaso, with lactic acidosis. 3/21 L femoral HD cath placed and CVVHD started with improvement in acidosis. Pressors off by end of day. Primacor weaned overnight. Patient woke up and follows commands, exhibited facial twitching cw prior seizures per wife and ativan given followed by vEEG placement. Neurology consulted for recs given subtherapeutic AED levels. Given 2 units pRBC.     FAMILY HISTORY:  No pertinent family history in first degree relatives  PAST MEDICAL & SURGICAL HISTORY:  HTN (hypertension)  Aortic dissection  CAD (coronary artery disease)  Seizure disorde  S/P aortic bifurcation bypass graft  S/P CABGx 1        Patient is a 53y old  Male who presents with a chief complaint of expanding aortic arch and descending aortic aneurysm.      14 system review was unremarkable    Vital signs, hemodynamic and respiratory parameters were reviewed from the bedside nursing flowsheet.  ICU Vital Signs Last 24 Hrs  T(C): 36.7 (22 Mar 2023 08:00), Max: 36.7 (22 Mar 2023 07:00)  T(F): 98.1 (22 Mar 2023 08:00), Max: 98.1 (22 Mar 2023 07:00)  HR: 90 (22 Mar 2023 08:00) (80 - 96)  BP: --  BP(mean): --  ABP: 115/55 (22 Mar 2023 08:00) (90/55 - 131/79)  ABP(mean): 72 (22 Mar 2023 08:00) (71 - 104)  RR: 14 (22 Mar 2023 08:00) (14 - 14)  SpO2: 96% (22 Mar 2023 08:00) (96% - 100%)    O2 Parameters below as of 22 Mar 2023 08:00  Patient On (Oxygen Delivery Method): ventilator    O2 Concentration (%): 50      Adult Advanced Hemodynamics Last 24 Hrs  CVP(mm Hg): 6 (22 Mar 2023 08:00) (6 - 19)  CVP(cm H2O): --  CO: 4.2 (21 Mar 2023 13:00) (3.9 - 4.2)  CI: 2.1 (21 Mar 2023 13:00) (1.9 - 2.1)  PA: 25/14 (22 Mar 2023 08:00) (24/15 - 35/26)  PA(mean): 20 (22 Mar 2023 08:00) (19 - 29)  PCWP: --  SVR: 1598 (21 Mar 2023 13:00) (1388 - 1679)  SVRI: 3196 (21 Mar 2023 13:00) (6258 - 2048)  PVR: --  PVRI: --, ABG - ( 22 Mar 2023 03:17 )  pH, Arterial: 7.43  pH, Blood: x     /  pCO2: 38    /  pO2: 72    / HCO3: 25    / Base Excess: 1.0   /  SaO2: 96.5              Mode: AC/ CMV (Assist Control/ Continuous Mandatory Ventilation)  RR (machine): 14  TV (machine): 650  FiO2: 50  PEEP: 5  ITime: 1  MAP: 11  PIP: 23    Intake and output was reviewed and the fluid balance was calculated  Daily     Daily   I&O's Summary    21 Mar 2023 07:01  -  22 Mar 2023 07:00  --------------------------------------------------------  IN: 7689.2 mL / OUT: 2698 mL / NET: 4991.2 mL    22 Mar 2023 07:01  -  22 Mar 2023 09:15  --------------------------------------------------------  IN: 39.6 mL / OUT: 124 mL / NET: -84.4 mL        All lines and drain sites were assessed  Glycemic trend was reviewed    POCT Blood Glucose.: 137 mg/dL (21 Mar 2023 21:58)    Neuro: sedated  HEENT: mmm  Heart: s1 s2   Lungs: decreased bl  Abdomen: soft nt nd  Extremities: 2+dp    Lines:  RIJ Cordis with [place] 3/20  R radial arterial line 3/20  L femoral arterial line 3/20  L femoral HD catheter 3/21    Tubes:  Med bella x 3  Pleural bella x 2      labs  CBC Full  -  ( 22 Mar 2023 03:17 )  WBC Count : 15.73 K/uL  RBC Count : 3.38 M/uL  Hemoglobin : 10.6 g/dL  Hematocrit : 29.0 %  Platelet Count - Automated : 53 K/uL  Mean Cell Volume : 85.8 fl  Mean Cell Hemoglobin : 31.4 pg  Mean Cell Hemoglobin Concentration : 36.6 gm/dL  Auto Neutrophil # : x  Auto Lymphocyte # : x  Auto Monocyte # : x  Auto Eosinophil # : x  Auto Basophil # : x  Auto Neutrophil % : x  Auto Lymphocyte % : x  Auto Monocyte % : x  Auto Eosinophil % : x  Auto Basophil % : x    03-22    141  |  106  |  19  ----------------------------<  85  5.2   |  25  |  1.88<H>    Ca    7.9<L>      22 Mar 2023 03:17  Phos  4.4     03-22  Mg     2.0     03-22    TPro  4.4<L>  /  Alb  2.7<L>  /  TBili  0.6  /  DBili  x   /  AST  351<H>  /  ALT  69<H>  /  AlkPhos  40  03-22    PT/INR - ( 22 Mar 2023 03:17 )   PT: 16.5 sec;   INR: 1.38          PTT - ( 22 Mar 2023 03:17 )  PTT:38.6 sec  The current medications were reviewed   MEDICATIONS  (STANDING):  aspirin  chewable 81 milliGRAM(s) Enteral Tube daily  ceFAZolin   IVPB 2000 milliGRAM(s) IV Intermittent every 12 hours  chlorhexidine 0.12% Liquid 15 milliLiter(s) Oral Mucosa every 12 hours  chlorhexidine 2% Cloths 1 Application(s) Topical daily  CRRT Treatment    <Continuous>  dextrose 10%. 250 milliLiter(s) (500 mL/Hr) IV Continuous <Continuous>  dextrose 50% Injectable 50 milliLiter(s) IV Push every 15 minutes  dextrose 50% Injectable 25 milliLiter(s) IV Push every 15 minutes  furosemide Infusion 15 mG/Hr (7.5 mL/Hr) IV Continuous <Continuous>  heparin   Injectable 5000 Unit(s) SubCutaneous every 8 hours  insulin regular Infusion 1 Unit(s)/Hr (1 mL/Hr) IV Continuous <Continuous>  lacosamide IVPB 100 milliGRAM(s) IV Intermittent every 12 hours  milrinone Infusion 0.25 MICROgram(s)/kG/Min (5.78 mL/Hr) IV Continuous <Continuous>  pantoprazole  Injectable 40 milliGRAM(s) IV Push daily  propofol Infusion 5 MICROgram(s)/kG/Min (2.31 mL/Hr) IV Continuous <Continuous>  PureFlow Dialysate RFP-400 (K 2 / Ca 3) 5000 milliLiter(s) (3000 mL/Hr) CRRT <Continuous>  sodium chloride 0.9%. 1000 milliLiter(s) (10 mL/Hr) IV Continuous <Continuous>  valproate sodium  IVPB 750 milliGRAM(s) IV Intermittent every 12 hours  vasopressin Infusion 0.03 Unit(s)/Min (4.5 mL/Hr) IV Continuous <Continuous>    MEDICATIONS  (PRN):      Assessment/Plan:  53yr old male with PMH HTN, type A dissection sp Dacron grafts and AV resuspension (2013), CAD sp CABG x 1 ([facility], 5/2013, SVG-RCA), seizures, admitted for surgical mgmt of progression of aneurysmal disease.     POD1 replacement of transverse aortic arch, second stage thoracic endovascular aortic repair, aorto-axillary bypass, AV replacement (bio 23mm), and CABG x 1 (SVG-RCA) (EF 75%, [place], 3/20)  RASS goal 0/-1, wean sedation to assess neuro status  Acute respiratory failure requiring mechanical ventilation-keep  Cardiogenic shock requiring primacor-keep for now  Serial Ci/CO, trend end organ perfusion markers  CVVHD for volume revoval  on Armaan-wean   Lactic gznkmana-pabodwhdu-qssvbmgs to trend  On lasix gtt-dc  Seizure disorder on vEEG, AEDs restarted per neurology, appreciate recs  Fu EEG report  Acute post operative anemia-trend H/H  Thrombocytopenia-monitor  Mild LFT elevation-trend  Trend CK  TREY now  Replete lytes prn  Monitor CT output  GI/DVT PPX  Close hemodynamic, ventilatory and drain monitoring and management per post op routine  Monitor for arrhythmias and monitor parameters for organ perfusion  Beta blockade not administered due to hemodynamic instability and bradycardia  Monitor neurologic status  Head of the bed should remain elevated to 45 deg   Chest PT and IS will be encouraged  Monitor adequacy of oxygenation and ventilation and attempt to wean oxygen  Antibiotic regimen will be tailored to the clinical, laboratory and microbiologic data  Nutritional goals will be met using po eventually, ensure adequate caloric intake and monitor the same  Stress ulcer and VTE prophylaxis will be achieved    Glycemic control is satisfactory  Electrolytes have been repleted as necessary and wound care has been carried out   Pain control has been achieved.   Aggressive physical therapy and early mobility and ambulation goals will be met   The family was updated about the course and plan.    CRITICAL CARE TIME personally provided by me  in evaluation and management, reassessments, review and interpretation of labs and x-rays, ventilator and hemodynamic management, formulating a plan and coordinating care: ___105____ MIN.  Time does not include procedural time.    CTICU ATTENDING     					  Alexx Brooks MD

## 2023-03-22 NOTE — PROGRESS NOTE ADULT - SUBJECTIVE AND OBJECTIVE BOX
INTERVAL COURSE  POD # 2   AVR/total arch/ TEVAR/ aorto-axillary bypass and CABG x 1   worsening renal function and acidosis post op requiring CVVHD initiation  intact neuro exam and non focal   decreasing pressors req/ on Milrinone and 20 PPM Armaan  Overnight with worsening PFR and mixed venous sats started during the day requiring higher fiO1 and PEEP  Cardiac indices unchanged/ H&H in good range- low PA pressures, CVP Fb 1.7 L net Neg for today     ICU Vital Signs Last 24 Hrs  T(C): 36.7 (22 Mar 2023 21:00), Max: 36.8 (22 Mar 2023 10:00)  T(F): 98.1 (22 Mar 2023 21:00), Max: 98.2 (22 Mar 2023 10:00)  HR: 100 (23 Mar 2023 00:00) (84 - 105)  ABP: 108/56 (23 Mar 2023 00:00) (72/45 - 148/69)  ABP(mean): 68 (23 Mar 2023 00:00) (55 - 90)  RR: 20 (23 Mar 2023 00:00) (14 - 20)  SpO2: 99% (23 Mar 2023 00:00) (91% - 100%)  O2 Parameters below as of 23 Mar 2023 00:00  Patient On (Oxygen Delivery Method): ventilator        Adult Advanced Hemodynamics Last 24 Hrs  CVP(mm Hg): 6 (23 Mar 2023 00:00) (2 - 10)  CO: 4.5 (22 Mar 2023 22:00) (3.3 - 4.4)  CI: 2.3 (22 Mar 2023 22:00) (1.6 - 2.2)  PA: 28/19 (23 Mar 2023 00:00) ( - )  PA(mean): 24 (23 Mar 2023 00:00) (18 - 26)  SVR: 1170 (22 Mar 2023 22:00) (1253 - 1598)  ABG - ( 22 Mar 2023 23:10 )  pH, Arterial: 7.48  pH, Blood: x     /  pCO2: 33    /  pO2: 61    / HCO3: 25    / Base Excess: 1.6   /  SaO2: 94.8        Mode: AC/ CMV (Assist Control/ Continuous Mandatory Ventilation)  RR (machine): 14  TV (machine): 650  FiO2: 100  PEEP: 5  ITime: 1  MAP: 17  PIP: 25    I&O's Summary    21 Mar 2023 07:01  -  22 Mar 2023 07:00  --------------------------------------------------------  IN: 7689.2 mL / OUT: 2698 mL / NET: 4991.2 mL    22 Mar 2023 07:01  -  23 Mar 2023 01:04  --------------------------------------------------------  IN: 774.5 mL / OUT: 2469 mL / NET: -1694.5 mL      PHYSICAL EXAM  General: sedated and intubated- tachypneic   Respiratory: CTA B/L; no wheezes  Cardiovascular: Sinus tach  Gastrointestinal: Soft; NTND  Extremities: Warm, palpable distal pulses, anasarcic   Neurological:  CNII-XII grossly intact; no obvious focal deficits    MEDICATIONS  (STANDING):  aspirin  chewable 81 milliGRAM(s) Enteral Tube daily  chlorhexidine 0.12% Liquid 15 milliLiter(s) Oral Mucosa every 12 hours  chlorhexidine 2% Cloths 1 Application(s) Topical daily  CRRT Treatment    <Continuous>  dextrose 10%. 250 milliLiter(s) (500 mL/Hr) IV Continuous <Continuous>  dextrose 10%. 50 milliLiter(s) (10 mL/Hr) IV Continuous <Continuous>  dextrose 5%. 1000 milliLiter(s) (50 mL/Hr) IV Continuous <Continuous>  dextrose 5%. 1000 milliLiter(s) (100 mL/Hr) IV Continuous <Continuous>  dextrose 50% Injectable 50 milliLiter(s) IV Push every 15 minutes  dextrose 50% Injectable 25 milliLiter(s) IV Push every 15 minutes  glucagon  Injectable 1 milliGRAM(s) IntraMuscular once  insulin lispro (ADMELOG) corrective regimen sliding scale   SubCutaneous three times a day before meals  insulin lispro (ADMELOG) corrective regimen sliding scale   SubCutaneous at bedtime  lacosamide IVPB 100 milliGRAM(s) IV Intermittent every 12 hours  milrinone Infusion 0.25 MICROgram(s)/kG/Min (5.78 mL/Hr) IV Continuous <Continuous>  pantoprazole  Injectable 40 milliGRAM(s) IV Push daily  propofol Infusion 5 MICROgram(s)/kG/Min (2.31 mL/Hr) IV Continuous <Continuous>  PureFlow Dialysate RFP-400 (K 2 / Ca 3) 5000 milliLiter(s) (3000 mL/Hr) CRRT <Continuous>  sodium chloride 0.9%. 1000 milliLiter(s) (10 mL/Hr) IV Continuous <Continuous>  valproate sodium  IVPB 750 milliGRAM(s) IV Intermittent every 12 hours  vasopressin Infusion 0.03 Unit(s)/Min (4.5 mL/Hr) IV Continuous <Continuous>    MEDICATIONS  (PRN):  dextrose Oral Gel 15 Gram(s) Oral once PRN Blood Glucose LESS THAN 70 milliGRAM(s)/deciliter      LABS                        9.8    13.02 )-----------( 35       ( 23 Mar 2023 00:12 )             27.8     03    136  |  104  |  19  ----------------------------<  82  4.3   |  25  |  1.81<H>    Ca    7.8<L>      22 Mar 2023 23:10  Phos  4.0     03-  Mg     1.8         TPro  4.4<L>  /  Alb  2.3<L>  /  TBili  0.7  /  DBili  x   /  AST  312<H>  /  ALT  45  /  AlkPhos  54  03-22    LIVER FUNCTIONS - ( 22 Mar 2023 23:10 )  Alb: 2.3 g/dL / Pro: 4.4 g/dL / ALK PHOS: 54 U/L / ALT: 45 U/L / AST: 312 U/L / GGT: x           PT/INR - ( 22 Mar 2023 23:10 )   PT: 18.5 sec;   INR: 1.55          PTT - ( 22 Mar 2023 23:10 )  PTT:37.0 sec  Urinalysis Basic - ( 21 Mar 2023 01:22 )    Color: Yellow / Appearance: Clear / S.020 / pH: x  Gluc: x / Ketone: NEGATIVE  / Bili: Negative / Urobili: 0.2 E.U./dL   Blood: x / Protein: Trace mg/dL / Nitrite: NEGATIVE   Leuk Esterase: NEGATIVE / RBC: < 5 /HPF / WBC 5-10 /HPF   Sq Epi: x / Non Sq Epi: 0-5 /HPF / Bacteria: Present /HPF      CARDIAC MARKERS ( 21 Mar 2023 05:59 )  x     / x     / 1566 U/L / x     / x        IMAGING/EKG/ETC  Reviewed, worsening of right pleural effusion

## 2023-03-22 NOTE — PROGRESS NOTE ADULT - NS ATTEND AMEND GEN_ALL_CORE FT
see attestation to consult note. Brow Lift Text: A midfrontal incision was made medially to the defect to allow access to the tissues just superior to the left eyebrow. Following careful dissection inferiorly in a supraperiosteal plane to the level of the left eyebrow, several 3-0 monocryl sutures were used to resuspend the eyebrow orbicularis oculi muscular unit to the superior frontal bone periosteum. This resulted in an appropriate reapproximation of static eyebrow symmetry and correction of the left brow ptosis.

## 2023-03-22 NOTE — PROGRESS NOTE ADULT - ASSESSMENT
53 year old male, current every day marijuana use with a past medical hx of HTN, type A aortic dissection s/p Dacron grafts, AV resuspension in 2013, CAD s/p CABG x 1 SVG to RCA (5/2013 with Dr. Medina), seizure disorder (last episode on 7/4/22, on vimpat and depakote) who was admitted, and underwent  replacement of transverse aortic arch, second stage thoracic endovascular aortic repair, aorto-axillary bypass, AV replacement (bio 23mm), and CABG x 1 (SVG-RCA) (EF 75%, [facility], 3/20/23). Epilepsy consulted for facial twitching, and AED management. Unclear if the facial movement was seizure or not, given that his wife report that it did not resemble his typical events.    Recommendations:   - Continue vEEG monitoring   - Continue Depakote 750mg Q12hrs  - Continue Vimpat 100mg Q12hrs  - Maintain seizure and fall precautions

## 2023-03-22 NOTE — PROGRESS NOTE ADULT - ASSESSMENT
52 year old male, current every day marijuana use w/pmh of HTN, type A aortic dissection s/p Dacron grafts, AV resuspension in 2013,CAD s/p CABG x 1 SVG to RCA (5/2013 with Dr. Medina), seizure disorder (last episode on 7/4/22) presents for a follow up visit for evaluation and management of his aortic pathology and deemed a candidate for a reop total arch replacement given aortic aneurysm and chronic dissection.  S/p AVR/ Arch replacement/ aorto-axillary bypass- CABG x 1 and TEVAR   3/20  Postop course with worsening renal failure and sig acidosis started on CVVHD  Now with decreasing pressors and inotrops requirement and improving acidosis   A/p   - Nonfocal with intact neuro exam will continue neuro checks per protocol   facial twitching stopped, vEEG neg for active seizures activity continue home depakote and vimpat per epilepsy recs   (Valproate level low 38  3/21)  - worsening PFR and Mixed venous sats with unchanged cardiac indices and PA pressures  Pocus with no sig pulm congestion but large right sided pleural effusion  Bronchoscopy showing right mainstem mucus plugging which was lavaged and aspirated   Will follow serial gas and post bronch CXR- Right CT placement if O2 req remains high   - increased pressor req and addition of laquita following increasing sedation and paralytics pre procedure also for improving vent synchrony i.s.o hypoxia   Will wean down as tolerated post bronch/ UF on hold   Continue milrinone 0.25 and Gwen 20 PPM   - Trickle feeds with no residue- Advance slowly per protocol   - started on D10 infusion for borderline hypoglycemia   - Renal failure most likely iATN given prolonged cross clamp- continue CVVHD tonight can consider transitioning to diuretic challenge for anasarca as does not need further clearance at this time,Trend CK   - H&H in good range- CT output minimal though with worsening thrombocytopenia-- HIT panel pending   Continue SCD/ PPI and HOB 30  SAT per protocol     ATTENDING: I have personally and independently provided 45 min of critical care services. this excludes time spent on sperate procedures or teaching.

## 2023-03-22 NOTE — PROGRESS NOTE ADULT - SUBJECTIVE AND OBJECTIVE BOX
Subjective  No events overnight. No complaints currently.    ROS  As above, otherwise negative for constitutional/HEENT/CV/pulm/GI//MSK/neuro/derm/endocrine/psych.     MEDICATIONS  (STANDING):  aspirin  chewable 81 milliGRAM(s) Enteral Tube daily  ceFAZolin   IVPB 2000 milliGRAM(s) IV Intermittent every 12 hours  chlorhexidine 0.12% Liquid 15 milliLiter(s) Oral Mucosa every 12 hours  chlorhexidine 2% Cloths 1 Application(s) Topical daily  CRRT Treatment    <Continuous>  dextrose 10%. 250 milliLiter(s) (500 mL/Hr) IV Continuous <Continuous>  dextrose 50% Injectable 50 milliLiter(s) IV Push every 15 minutes  dextrose 50% Injectable 25 milliLiter(s) IV Push every 15 minutes  furosemide Infusion 15 mG/Hr (7.5 mL/Hr) IV Continuous <Continuous>  heparin   Injectable 5000 Unit(s) SubCutaneous every 8 hours  insulin regular Infusion 1 Unit(s)/Hr (1 mL/Hr) IV Continuous <Continuous>  lacosamide IVPB 100 milliGRAM(s) IV Intermittent every 12 hours  milrinone Infusion 0.25 MICROgram(s)/kG/Min (5.78 mL/Hr) IV Continuous <Continuous>  pantoprazole  Injectable 40 milliGRAM(s) IV Push daily  propofol Infusion 5 MICROgram(s)/kG/Min (2.31 mL/Hr) IV Continuous <Continuous>  PureFlow Dialysate RFP-400 (K 2 / Ca 3) 5000 milliLiter(s) (3000 mL/Hr) CRRT <Continuous>  sodium chloride 0.9%. 1000 milliLiter(s) (10 mL/Hr) IV Continuous <Continuous>  valproate sodium  IVPB 750 milliGRAM(s) IV Intermittent every 12 hours  vasopressin Infusion 0.03 Unit(s)/Min (4.5 mL/Hr) IV Continuous <Continuous>    MEDICATIONS  (PRN):      T(C): 36.7 (03-22-23 @ 08:00), Max: 36.7 (03-22-23 @ 07:00)  HR: 92 (03-22-23 @ 09:22) (80 - 96)  BP: --  RR: 16 (03-22-23 @ 09:22) (14 - 16)  SpO2: 95% (03-22-23 @ 09:22) (95% - 100%)  Wt(kg): --    Eyes open spontaneously. Conversational with appropriate sentences.  EOMI. Visual fields full. PERRL 3>2. Face symmetrical.  Full strength throughout.  Finger-nose-finger intact R/L.  Intact to light touch throughout.    CBC Full  -  ( 22 Mar 2023 03:17 )  WBC Count : 15.73 K/uL  RBC Count : 3.38 M/uL  Hemoglobin : 10.6 g/dL  Hematocrit : 29.0 %  Platelet Count - Automated : 53 K/uL  Mean Cell Volume : 85.8 fl  Mean Cell Hemoglobin : 31.4 pg  Mean Cell Hemoglobin Concentration : 36.6 gm/dL  Auto Neutrophil # : x  Auto Lymphocyte # : x  Auto Monocyte # : x  Auto Eosinophil # : x  Auto Basophil # : x  Auto Neutrophil % : x  Auto Lymphocyte % : x  Auto Monocyte % : x  Auto Eosinophil % : x  Auto Basophil % : x    03-22    141  |  106  |  19  ----------------------------<  85  5.2   |  25  |  1.88<H>    Ca    7.9<L>      22 Mar 2023 03:17  Phos  4.4     03-22  Mg     2.0     03-22    TPro  4.4<L>  /  Alb  2.7<L>  /  TBili  0.6  /  DBili  x   /  AST  351<H>  /  ALT  69<H>  /  AlkPhos  40  03-22    LIVER FUNCTIONS - ( 22 Mar 2023 03:17 )  Alb: 2.7 g/dL / Pro: 4.4 g/dL / ALK PHOS: 40 U/L / ALT: 69 U/L / AST: 351 U/L / GGT: x           PT/INR - ( 22 Mar 2023 03:17 )   PT: 16.5 sec;   INR: 1.38          PTT - ( 22 Mar 2023 03:17 )  PTT:38.6 sec      EEG: EPILEPSY PROGRESS NOTE:  Patient remain intubated and sedated on propofol 20mcg/kg/hr. There were no report of seizure events or facial twitching overnight.     REVIEW OF SYSTEMS:  Unable to obtain 2/2 medically induced coma.     MEDICATIONS  (STANDING):  aspirin  chewable 81 milliGRAM(s) Enteral Tube daily  ceFAZolin   IVPB 2000 milliGRAM(s) IV Intermittent every 12 hours  chlorhexidine 0.12% Liquid 15 milliLiter(s) Oral Mucosa every 12 hours  chlorhexidine 2% Cloths 1 Application(s) Topical daily  CRRT Treatment    <Continuous>  dextrose 10%. 250 milliLiter(s) (500 mL/Hr) IV Continuous <Continuous>  dextrose 50% Injectable 50 milliLiter(s) IV Push every 15 minutes  dextrose 50% Injectable 25 milliLiter(s) IV Push every 15 minutes  furosemide Infusion 15 mG/Hr (7.5 mL/Hr) IV Continuous <Continuous>  heparin   Injectable 5000 Unit(s) SubCutaneous every 8 hours  insulin regular Infusion 1 Unit(s)/Hr (1 mL/Hr) IV Continuous <Continuous>  lacosamide IVPB 100 milliGRAM(s) IV Intermittent every 12 hours  milrinone Infusion 0.25 MICROgram(s)/kG/Min (5.78 mL/Hr) IV Continuous <Continuous>  pantoprazole  Injectable 40 milliGRAM(s) IV Push daily  propofol Infusion 5 MICROgram(s)/kG/Min (2.31 mL/Hr) IV Continuous <Continuous>  PureFlow Dialysate RFP-400 (K 2 / Ca 3) 5000 milliLiter(s) (3000 mL/Hr) CRRT <Continuous>  sodium chloride 0.9%. 1000 milliLiter(s) (10 mL/Hr) IV Continuous <Continuous>  valproate sodium  IVPB 750 milliGRAM(s) IV Intermittent every 12 hours  vasopressin Infusion 0.03 Unit(s)/Min (4.5 mL/Hr) IV Continuous <Continuous>    VITAL SIGNS:   T(C): 36.7 (03-22-23 @ 08:00), Max: 36.7 (03-22-23 @ 07:00)  HR: 92 (03-22-23 @ 09:22) (80 - 96)  BP: --  RR: 16 (03-22-23 @ 09:22) (14 - 16)  SpO2: 95% (03-22-23 @ 09:22) (95% - 100%)  Wt(kg): --    PHYSICAL EXAM:   Constitutional: Mid-aged man lying in bed intubated and sedated.   Extremities: +2 edema  Skin: no rash or neurocutaneous signs     Cognitive:  Eyes closed w/ scleral edema. Intubated and sedated on propofol 20mcg/kg/hr. No verbal output or following commands.     Cranial Nerves:  III/IV/VI: PERRL 3mm brisk, VII: Face appears symmetric   Motor:  No withdrawal to pain (nurse reports move all limbs off sedation)  Sensation:  Unable to assess  Coordination/Gait:  Unable to assess  Reflexes:  Absent in all limbs, toes mute    LABS:   CBC Full  -  ( 22 Mar 2023 03:17 )  WBC Count : 15.73 K/uL  RBC Count : 3.38 M/uL  Hemoglobin : 10.6 g/dL  Hematocrit : 29.0 %  Platelet Count - Automated : 53 K/uL  Mean Cell Volume : 85.8 fl  Mean Cell Hemoglobin : 31.4 pg  Mean Cell Hemoglobin Concentration : 36.6 gm/dL  Auto Neutrophil # : x  Auto Lymphocyte # : x  Auto Monocyte # : x  Auto Eosinophil # : x  Auto Basophil # : x  Auto Neutrophil % : x  Auto Lymphocyte % : x  Auto Monocyte % : x  Auto Eosinophil % : x  Auto Basophil % : x    03-22    141  |  106  |  19  ----------------------------<  85  5.2   |  25  |  1.88<H>    Ca    7.9<L>      22 Mar 2023 03:17  Phos  4.4     03-22  Mg     2.0     03-22    TPro  4.4<L>  /  Alb  2.7<L>  /  TBili  0.6  /  DBili  x   /  AST  351<H>  /  ALT  69<H>  /  AlkPhos  40  03-22    LIVER FUNCTIONS - ( 22 Mar 2023 03:17 )  Alb: 2.7 g/dL / Pro: 4.4 g/dL / ALK PHOS: 40 U/L / ALT: 69 U/L / AST: 351 U/L / GGT: x           PT/INR - ( 22 Mar 2023 03:17 )   PT: 16.5 sec;   INR: 1.38          PTT - ( 22 Mar 2023 03:17 )  PTT:38.6 sec      EEG:

## 2023-03-23 LAB
ALBUMIN SERPL ELPH-MCNC: 2.1 G/DL — LOW (ref 3.3–5)
ALBUMIN SERPL ELPH-MCNC: 2.3 G/DL — LOW (ref 3.3–5)
ALBUMIN SERPL ELPH-MCNC: 2.3 G/DL — LOW (ref 3.3–5)
ALP SERPL-CCNC: 59 U/L — SIGNIFICANT CHANGE UP (ref 40–120)
ALP SERPL-CCNC: 63 U/L — SIGNIFICANT CHANGE UP (ref 40–120)
ALP SERPL-CCNC: 66 U/L — SIGNIFICANT CHANGE UP (ref 40–120)
ALT FLD-CCNC: 24 U/L — SIGNIFICANT CHANGE UP (ref 10–45)
ALT FLD-CCNC: 28 U/L — SIGNIFICANT CHANGE UP (ref 10–45)
ALT FLD-CCNC: 36 U/L — SIGNIFICANT CHANGE UP (ref 10–45)
ANION GAP SERPL CALC-SCNC: 5 MMOL/L — SIGNIFICANT CHANGE UP (ref 5–17)
ANION GAP SERPL CALC-SCNC: 6 MMOL/L — SIGNIFICANT CHANGE UP (ref 5–17)
ANION GAP SERPL CALC-SCNC: 8 MMOL/L — SIGNIFICANT CHANGE UP (ref 5–17)
APTT BLD: 35.5 SEC — SIGNIFICANT CHANGE UP (ref 27.5–35.5)
APTT BLD: 35.8 SEC — HIGH (ref 27.5–35.5)
APTT BLD: 37.2 SEC — HIGH (ref 27.5–35.5)
APTT BLD: 37.6 SEC — HIGH (ref 27.5–35.5)
AST SERPL-CCNC: 217 U/L — HIGH (ref 10–40)
AST SERPL-CCNC: 225 U/L — HIGH (ref 10–40)
AST SERPL-CCNC: 263 U/L — HIGH (ref 10–40)
BASE EXCESS BLDA CALC-SCNC: 0.1 MMOL/L — SIGNIFICANT CHANGE UP (ref -2–3)
BASE EXCESS BLDV CALC-SCNC: 1.2 MMOL/L — SIGNIFICANT CHANGE UP (ref -2–3)
BASE EXCESS BLDV CALC-SCNC: 1.5 MMOL/L — SIGNIFICANT CHANGE UP (ref -2–3)
BASE EXCESS BLDV CALC-SCNC: 1.9 MMOL/L — SIGNIFICANT CHANGE UP (ref -2–3)
BASE EXCESS BLDV CALC-SCNC: 2 MMOL/L — SIGNIFICANT CHANGE UP (ref -2–3)
BILIRUB SERPL-MCNC: 0.9 MG/DL — SIGNIFICANT CHANGE UP (ref 0.2–1.2)
BILIRUB SERPL-MCNC: 0.9 MG/DL — SIGNIFICANT CHANGE UP (ref 0.2–1.2)
BILIRUB SERPL-MCNC: 1.1 MG/DL — SIGNIFICANT CHANGE UP (ref 0.2–1.2)
BUN SERPL-MCNC: 19 MG/DL — SIGNIFICANT CHANGE UP (ref 7–23)
BUN SERPL-MCNC: 19 MG/DL — SIGNIFICANT CHANGE UP (ref 7–23)
BUN SERPL-MCNC: 22 MG/DL — SIGNIFICANT CHANGE UP (ref 7–23)
CA-I SERPL-SCNC: 1.15 MMOL/L — SIGNIFICANT CHANGE UP (ref 1.15–1.33)
CA-I SERPL-SCNC: 1.16 MMOL/L — SIGNIFICANT CHANGE UP (ref 1.15–1.33)
CALCIUM SERPL-MCNC: 7.8 MG/DL — LOW (ref 8.4–10.5)
CALCIUM SERPL-MCNC: 7.8 MG/DL — LOW (ref 8.4–10.5)
CALCIUM SERPL-MCNC: 8.2 MG/DL — LOW (ref 8.4–10.5)
CHLORIDE SERPL-SCNC: 101 MMOL/L — SIGNIFICANT CHANGE UP (ref 96–108)
CHLORIDE SERPL-SCNC: 101 MMOL/L — SIGNIFICANT CHANGE UP (ref 96–108)
CHLORIDE SERPL-SCNC: 102 MMOL/L — SIGNIFICANT CHANGE UP (ref 96–108)
CO2 BLDA-SCNC: 25 MMOL/L — HIGH (ref 19–24)
CO2 BLDV-SCNC: 27.1 MMOL/L — HIGH (ref 22–26)
CO2 BLDV-SCNC: 27.7 MMOL/L — HIGH (ref 22–26)
CO2 BLDV-SCNC: 27.9 MMOL/L — HIGH (ref 22–26)
CO2 BLDV-SCNC: 28 MMOL/L — HIGH (ref 22–26)
CO2 SERPL-SCNC: 24 MMOL/L — SIGNIFICANT CHANGE UP (ref 22–31)
CO2 SERPL-SCNC: 26 MMOL/L — SIGNIFICANT CHANGE UP (ref 22–31)
CO2 SERPL-SCNC: 26 MMOL/L — SIGNIFICANT CHANGE UP (ref 22–31)
CREAT SERPL-MCNC: 1.83 MG/DL — HIGH (ref 0.5–1.3)
CREAT SERPL-MCNC: 1.84 MG/DL — HIGH (ref 0.5–1.3)
CREAT SERPL-MCNC: 2.1 MG/DL — HIGH (ref 0.5–1.3)
EGFR: 37 ML/MIN/1.73M2 — LOW
EGFR: 43 ML/MIN/1.73M2 — LOW
EGFR: 43 ML/MIN/1.73M2 — LOW
GAS PNL BLDA: SIGNIFICANT CHANGE UP
GAS PNL BLDV: 130 MMOL/L — LOW (ref 136–145)
GAS PNL BLDV: 134 MMOL/L — LOW (ref 136–145)
GAS PNL BLDV: SIGNIFICANT CHANGE UP
GAS PNL BLDV: SIGNIFICANT CHANGE UP
GLUCOSE BLDC GLUCOMTR-MCNC: 101 MG/DL — HIGH (ref 70–99)
GLUCOSE BLDC GLUCOMTR-MCNC: 192 MG/DL — HIGH (ref 70–99)
GLUCOSE BLDC GLUCOMTR-MCNC: 61 MG/DL — LOW (ref 70–99)
GLUCOSE BLDC GLUCOMTR-MCNC: 79 MG/DL — SIGNIFICANT CHANGE UP (ref 70–99)
GLUCOSE BLDC GLUCOMTR-MCNC: 82 MG/DL — SIGNIFICANT CHANGE UP (ref 70–99)
GLUCOSE BLDC GLUCOMTR-MCNC: 83 MG/DL — SIGNIFICANT CHANGE UP (ref 70–99)
GLUCOSE BLDC GLUCOMTR-MCNC: 84 MG/DL — SIGNIFICANT CHANGE UP (ref 70–99)
GLUCOSE BLDC GLUCOMTR-MCNC: 86 MG/DL — SIGNIFICANT CHANGE UP (ref 70–99)
GLUCOSE SERPL-MCNC: 106 MG/DL — HIGH (ref 70–99)
GLUCOSE SERPL-MCNC: 115 MG/DL — HIGH (ref 70–99)
GLUCOSE SERPL-MCNC: 81 MG/DL — SIGNIFICANT CHANGE UP (ref 70–99)
HCO3 BLDA-SCNC: 24 MMOL/L — SIGNIFICANT CHANGE UP (ref 21–28)
HCO3 BLDV-SCNC: 26 MMOL/L — SIGNIFICANT CHANGE UP (ref 22–29)
HCO3 BLDV-SCNC: 26 MMOL/L — SIGNIFICANT CHANGE UP (ref 22–29)
HCO3 BLDV-SCNC: 27 MMOL/L — SIGNIFICANT CHANGE UP (ref 22–29)
HCO3 BLDV-SCNC: 27 MMOL/L — SIGNIFICANT CHANGE UP (ref 22–29)
HCT VFR BLD CALC: 22.4 % — LOW (ref 39–50)
HCT VFR BLD CALC: 23.4 % — LOW (ref 39–50)
HCT VFR BLD CALC: 23.7 % — LOW (ref 39–50)
HCT VFR BLD CALC: 25.3 % — LOW (ref 39–50)
HCT VFR BLD CALC: 27.8 % — LOW (ref 39–50)
HEPARIN-PF4 AB RESULT: <0.6 U/ML — SIGNIFICANT CHANGE UP (ref 0–0.9)
HEPARIN-PF4 AB RESULT: <0.6 U/ML — SIGNIFICANT CHANGE UP (ref 0–0.9)
HGB BLD-MCNC: 7.9 G/DL — LOW (ref 13–17)
HGB BLD-MCNC: 8.4 G/DL — LOW (ref 13–17)
HGB BLD-MCNC: 8.5 G/DL — LOW (ref 13–17)
HGB BLD-MCNC: 9.2 G/DL — LOW (ref 13–17)
HGB BLD-MCNC: 9.8 G/DL — LOW (ref 13–17)
INR BLD: 1.05 — SIGNIFICANT CHANGE UP (ref 0.88–1.16)
INR BLD: 1.07 — SIGNIFICANT CHANGE UP (ref 0.88–1.16)
INR BLD: 1.15 — SIGNIFICANT CHANGE UP (ref 0.88–1.16)
INR BLD: 1.37 — HIGH (ref 0.88–1.16)
LACTATE SERPL-SCNC: 0.9 MMOL/L — SIGNIFICANT CHANGE UP (ref 0.5–2)
LACTATE SERPL-SCNC: 1.1 MMOL/L — SIGNIFICANT CHANGE UP (ref 0.5–2)
LACTATE SERPL-SCNC: 1.3 MMOL/L — SIGNIFICANT CHANGE UP (ref 0.5–2)
LACTATE SERPL-SCNC: 1.6 MMOL/L — SIGNIFICANT CHANGE UP (ref 0.5–2)
MAGNESIUM SERPL-MCNC: 1.8 MG/DL — SIGNIFICANT CHANGE UP (ref 1.6–2.6)
MAGNESIUM SERPL-MCNC: 2.1 MG/DL — SIGNIFICANT CHANGE UP (ref 1.6–2.6)
MAGNESIUM SERPL-MCNC: 2.3 MG/DL — SIGNIFICANT CHANGE UP (ref 1.6–2.6)
MCHC RBC-ENTMCNC: 31 PG — SIGNIFICANT CHANGE UP (ref 27–34)
MCHC RBC-ENTMCNC: 31.2 PG — SIGNIFICANT CHANGE UP (ref 27–34)
MCHC RBC-ENTMCNC: 31.2 PG — SIGNIFICANT CHANGE UP (ref 27–34)
MCHC RBC-ENTMCNC: 31.8 PG — SIGNIFICANT CHANGE UP (ref 27–34)
MCHC RBC-ENTMCNC: 32 PG — SIGNIFICANT CHANGE UP (ref 27–34)
MCHC RBC-ENTMCNC: 35.3 GM/DL — SIGNIFICANT CHANGE UP (ref 32–36)
MCHC RBC-ENTMCNC: 35.3 GM/DL — SIGNIFICANT CHANGE UP (ref 32–36)
MCHC RBC-ENTMCNC: 35.4 GM/DL — SIGNIFICANT CHANGE UP (ref 32–36)
MCHC RBC-ENTMCNC: 36.3 GM/DL — HIGH (ref 32–36)
MCHC RBC-ENTMCNC: 36.4 GM/DL — HIGH (ref 32–36)
MCV RBC AUTO: 87.5 FL — SIGNIFICANT CHANGE UP (ref 80–100)
MCV RBC AUTO: 88 FL — SIGNIFICANT CHANGE UP (ref 80–100)
MCV RBC AUTO: 88 FL — SIGNIFICANT CHANGE UP (ref 80–100)
MCV RBC AUTO: 88.1 FL — SIGNIFICANT CHANGE UP (ref 80–100)
MCV RBC AUTO: 88.5 FL — SIGNIFICANT CHANGE UP (ref 80–100)
NRBC # BLD: 0 /100 WBCS — SIGNIFICANT CHANGE UP (ref 0–0)
PCO2 BLDA: 34 MMHG — LOW (ref 35–48)
PCO2 BLDV: 39 MMHG — LOW (ref 42–55)
PCO2 BLDV: 40 MMHG — LOW (ref 42–55)
PCO2 BLDV: 41 MMHG — LOW (ref 42–55)
PCO2 BLDV: 44 MMHG — SIGNIFICANT CHANGE UP (ref 42–55)
PF4 HEPARIN CMPLX AB SER-ACNC: NEGATIVE — SIGNIFICANT CHANGE UP
PF4 HEPARIN CMPLX AB SER-ACNC: NEGATIVE — SIGNIFICANT CHANGE UP
PH BLDA: 7.45 — SIGNIFICANT CHANGE UP (ref 7.35–7.45)
PH BLDV: 7.39 — SIGNIFICANT CHANGE UP (ref 7.32–7.43)
PH BLDV: 7.42 — SIGNIFICANT CHANGE UP (ref 7.32–7.43)
PH BLDV: 7.43 — SIGNIFICANT CHANGE UP (ref 7.32–7.43)
PH BLDV: 7.43 — SIGNIFICANT CHANGE UP (ref 7.32–7.43)
PHOSPHATE SERPL-MCNC: 2.8 MG/DL — SIGNIFICANT CHANGE UP (ref 2.5–4.5)
PHOSPHATE SERPL-MCNC: 3.1 MG/DL — SIGNIFICANT CHANGE UP (ref 2.5–4.5)
PHOSPHATE SERPL-MCNC: 3.9 MG/DL — SIGNIFICANT CHANGE UP (ref 2.5–4.5)
PLATELET # BLD AUTO: 31 K/UL — LOW (ref 150–400)
PLATELET # BLD AUTO: 35 K/UL — LOW (ref 150–400)
PLATELET # BLD AUTO: 47 K/UL — LOW (ref 150–400)
PLATELET # BLD AUTO: 59 K/UL — LOW (ref 150–400)
PLATELET # BLD AUTO: 76 K/UL — LOW (ref 150–400)
PO2 BLDA: 119 MMHG — HIGH (ref 83–108)
PO2 BLDV: 31 MMHG — SIGNIFICANT CHANGE UP (ref 25–45)
PO2 BLDV: 40 MMHG — SIGNIFICANT CHANGE UP (ref 25–45)
PO2 BLDV: 41 MMHG — SIGNIFICANT CHANGE UP (ref 25–45)
PO2 BLDV: <33 MMHG — SIGNIFICANT CHANGE UP (ref 25–45)
POTASSIUM BLDV-SCNC: 3.2 MMOL/L — LOW (ref 3.5–5.1)
POTASSIUM BLDV-SCNC: 3.3 MMOL/L — LOW (ref 3.5–5.1)
POTASSIUM SERPL-MCNC: 3.5 MMOL/L — SIGNIFICANT CHANGE UP (ref 3.5–5.3)
POTASSIUM SERPL-MCNC: 3.7 MMOL/L — SIGNIFICANT CHANGE UP (ref 3.5–5.3)
POTASSIUM SERPL-MCNC: 4.2 MMOL/L — SIGNIFICANT CHANGE UP (ref 3.5–5.3)
POTASSIUM SERPL-SCNC: 3.5 MMOL/L — SIGNIFICANT CHANGE UP (ref 3.5–5.3)
POTASSIUM SERPL-SCNC: 3.7 MMOL/L — SIGNIFICANT CHANGE UP (ref 3.5–5.3)
POTASSIUM SERPL-SCNC: 4.2 MMOL/L — SIGNIFICANT CHANGE UP (ref 3.5–5.3)
PROT SERPL-MCNC: 4.3 G/DL — LOW (ref 6–8.3)
PROT SERPL-MCNC: 4.4 G/DL — LOW (ref 6–8.3)
PROT SERPL-MCNC: 4.6 G/DL — LOW (ref 6–8.3)
PROTHROM AB SERPL-ACNC: 12.5 SEC — SIGNIFICANT CHANGE UP (ref 10.5–13.4)
PROTHROM AB SERPL-ACNC: 12.8 SEC — SIGNIFICANT CHANGE UP (ref 10.5–13.4)
PROTHROM AB SERPL-ACNC: 13.7 SEC — HIGH (ref 10.5–13.4)
PROTHROM AB SERPL-ACNC: 16.4 SEC — HIGH (ref 10.5–13.4)
RBC # BLD: 2.53 M/UL — LOW (ref 4.2–5.8)
RBC # BLD: 2.66 M/UL — LOW (ref 4.2–5.8)
RBC # BLD: 2.69 M/UL — LOW (ref 4.2–5.8)
RBC # BLD: 2.89 M/UL — LOW (ref 4.2–5.8)
RBC # BLD: 3.16 M/UL — LOW (ref 4.2–5.8)
RBC # FLD: 15.1 % — HIGH (ref 10.3–14.5)
RBC # FLD: 15.2 % — HIGH (ref 10.3–14.5)
RBC # FLD: 15.6 % — HIGH (ref 10.3–14.5)
RBC # FLD: 15.6 % — HIGH (ref 10.3–14.5)
RBC # FLD: 15.8 % — HIGH (ref 10.3–14.5)
SAO2 % BLDA: 99.2 % — HIGH (ref 94–98)
SAO2 % BLDV: 43.7 % — LOW (ref 67–88)
SAO2 % BLDV: 49.1 % — LOW (ref 67–88)
SAO2 % BLDV: 68.2 % — SIGNIFICANT CHANGE UP (ref 67–88)
SAO2 % BLDV: 71.5 % — SIGNIFICANT CHANGE UP (ref 67–88)
SODIUM SERPL-SCNC: 132 MMOL/L — LOW (ref 135–145)
SODIUM SERPL-SCNC: 133 MMOL/L — LOW (ref 135–145)
SODIUM SERPL-SCNC: 134 MMOL/L — LOW (ref 135–145)
VALPROATE SERPL-MCNC: 36.5 UG/ML — LOW (ref 50–100)
VALPROATE SERPL-MCNC: 43.6 UG/ML — LOW (ref 50–100)
WBC # BLD: 12.18 K/UL — HIGH (ref 3.8–10.5)
WBC # BLD: 12.68 K/UL — HIGH (ref 3.8–10.5)
WBC # BLD: 12.73 K/UL — HIGH (ref 3.8–10.5)
WBC # BLD: 13.02 K/UL — HIGH (ref 3.8–10.5)
WBC # BLD: 13.65 K/UL — HIGH (ref 3.8–10.5)
WBC # FLD AUTO: 12.18 K/UL — HIGH (ref 3.8–10.5)
WBC # FLD AUTO: 12.68 K/UL — HIGH (ref 3.8–10.5)
WBC # FLD AUTO: 12.73 K/UL — HIGH (ref 3.8–10.5)
WBC # FLD AUTO: 13.02 K/UL — HIGH (ref 3.8–10.5)
WBC # FLD AUTO: 13.65 K/UL — HIGH (ref 3.8–10.5)

## 2023-03-23 RX ORDER — MAGNESIUM SULFATE 500 MG/ML
2 VIAL (ML) INJECTION ONCE
Refills: 0 | Status: COMPLETED | OUTPATIENT
Start: 2023-03-23 | End: 2023-03-23

## 2023-03-23 RX ORDER — CISATRACURIUM BESYLATE 2 MG/ML
10 INJECTION INTRAVENOUS ONCE
Refills: 0 | Status: COMPLETED | OUTPATIENT
Start: 2023-03-23 | End: 2023-03-23

## 2023-03-23 RX ORDER — AMIODARONE HYDROCHLORIDE 400 MG/1
1 TABLET ORAL
Qty: 450 | Refills: 0 | Status: DISCONTINUED | OUTPATIENT
Start: 2023-03-23 | End: 2023-03-24

## 2023-03-23 RX ORDER — ALBUMIN HUMAN 25 %
250 VIAL (ML) INTRAVENOUS ONCE
Refills: 0 | Status: COMPLETED | OUTPATIENT
Start: 2023-03-23 | End: 2023-03-23

## 2023-03-23 RX ORDER — PHENYLEPHRINE HYDROCHLORIDE 10 MG/ML
0.2 INJECTION INTRAVENOUS
Qty: 40 | Refills: 0 | Status: DISCONTINUED | OUTPATIENT
Start: 2023-03-23 | End: 2023-03-28

## 2023-03-23 RX ORDER — POTASSIUM CHLORIDE 20 MEQ
20 PACKET (EA) ORAL
Refills: 0 | Status: COMPLETED | OUTPATIENT
Start: 2023-03-23 | End: 2023-03-23

## 2023-03-23 RX ORDER — AMIODARONE HYDROCHLORIDE 400 MG/1
150 TABLET ORAL ONCE
Refills: 0 | Status: COMPLETED | OUTPATIENT
Start: 2023-03-23 | End: 2023-03-23

## 2023-03-23 RX ORDER — FENTANYL CITRATE 50 UG/ML
50 INJECTION INTRAVENOUS ONCE
Refills: 0 | Status: DISCONTINUED | OUTPATIENT
Start: 2023-03-23 | End: 2023-03-23

## 2023-03-23 RX ORDER — POTASSIUM CHLORIDE 20 MEQ
20 PACKET (EA) ORAL ONCE
Refills: 0 | Status: COMPLETED | OUTPATIENT
Start: 2023-03-23 | End: 2023-03-23

## 2023-03-23 RX ORDER — DIVALPROEX SODIUM 500 MG/1
750 TABLET, DELAYED RELEASE ORAL EVERY 12 HOURS
Refills: 0 | Status: DISCONTINUED | OUTPATIENT
Start: 2023-03-23 | End: 2023-03-23

## 2023-03-23 RX ORDER — METOCLOPRAMIDE HCL 10 MG
10 TABLET ORAL EVERY 12 HOURS
Refills: 0 | Status: DISCONTINUED | OUTPATIENT
Start: 2023-03-23 | End: 2023-03-25

## 2023-03-23 RX ORDER — ALBUMIN HUMAN 25 %
50 VIAL (ML) INTRAVENOUS EVERY 8 HOURS
Refills: 0 | Status: DISCONTINUED | OUTPATIENT
Start: 2023-03-23 | End: 2023-03-24

## 2023-03-23 RX ADMIN — Medication 10 MILLIGRAM(S): at 12:38

## 2023-03-23 RX ADMIN — Medication 25 GRAM(S): at 10:01

## 2023-03-23 RX ADMIN — CISATRACURIUM BESYLATE 10 MILLIGRAM(S): 2 INJECTION INTRAVENOUS at 00:40

## 2023-03-23 RX ADMIN — Medication 100 MILLIEQUIVALENT(S): at 20:35

## 2023-03-23 RX ADMIN — PANTOPRAZOLE SODIUM 40 MILLIGRAM(S): 20 TABLET, DELAYED RELEASE ORAL at 13:17

## 2023-03-23 RX ADMIN — FENTANYL CITRATE 50 MICROGRAM(S): 50 INJECTION INTRAVENOUS at 00:55

## 2023-03-23 RX ADMIN — PROPOFOL 2.31 MICROGRAM(S)/KG/MIN: 10 INJECTION, EMULSION INTRAVENOUS at 04:31

## 2023-03-23 RX ADMIN — LACOSAMIDE 120 MILLIGRAM(S): 50 TABLET ORAL at 07:28

## 2023-03-23 RX ADMIN — Medication 50 MILLILITER(S): at 11:00

## 2023-03-23 RX ADMIN — Medication 500 MILLILITER(S): at 08:05

## 2023-03-23 RX ADMIN — FENTANYL CITRATE 50 MICROGRAM(S): 50 INJECTION INTRAVENOUS at 00:40

## 2023-03-23 RX ADMIN — PROPOFOL 2.31 MICROGRAM(S)/KG/MIN: 10 INJECTION, EMULSION INTRAVENOUS at 09:04

## 2023-03-23 RX ADMIN — CISATRACURIUM BESYLATE 10 MILLIGRAM(S): 2 INJECTION INTRAVENOUS at 14:00

## 2023-03-23 RX ADMIN — AMIODARONE HYDROCHLORIDE 33.3 MG/MIN: 400 TABLET ORAL at 21:07

## 2023-03-23 RX ADMIN — LACOSAMIDE 120 MILLIGRAM(S): 50 TABLET ORAL at 19:55

## 2023-03-23 RX ADMIN — CHLORHEXIDINE GLUCONATE 15 MILLILITER(S): 213 SOLUTION TOPICAL at 20:35

## 2023-03-23 RX ADMIN — CHLORHEXIDINE GLUCONATE 15 MILLILITER(S): 213 SOLUTION TOPICAL at 05:40

## 2023-03-23 RX ADMIN — Medication 50 MILLILITER(S): at 22:21

## 2023-03-23 RX ADMIN — Medication 500 MILLILITER(S): at 17:50

## 2023-03-23 RX ADMIN — Medication 125 MILLILITER(S): at 04:29

## 2023-03-23 RX ADMIN — Medication 100 MILLIEQUIVALENT(S): at 18:08

## 2023-03-23 RX ADMIN — Medication 57.5 MILLIGRAM(S): at 06:22

## 2023-03-23 RX ADMIN — Medication 100 MILLIEQUIVALENT(S): at 19:25

## 2023-03-23 RX ADMIN — MILRINONE LACTATE 5.78 MICROGRAM(S)/KG/MIN: 1 INJECTION, SOLUTION INTRAVENOUS at 05:41

## 2023-03-23 RX ADMIN — Medication 57.5 MILLIGRAM(S): at 19:25

## 2023-03-23 RX ADMIN — AMIODARONE HYDROCHLORIDE 200 MILLIGRAM(S): 400 TABLET ORAL at 18:07

## 2023-03-23 RX ADMIN — PROPOFOL 2.31 MICROGRAM(S)/KG/MIN: 10 INJECTION, EMULSION INTRAVENOUS at 22:05

## 2023-03-23 NOTE — DIETITIAN INITIAL EVALUATION ADULT - ENTERAL
If medically feasible, recommend EN via NGT:  Nepro@ 40mL/hr x 24 hours + 3LPS. This provides: 960mL TV,  2028kcal,  123g protein, 698mL free water. Meetinkcal/kg,  1.6g/kg protein based on ABW  77.1kg. Defer fluids to team. Maintain aspiration precautions. Monitor s/s of intolerance; adjust EN as needed.

## 2023-03-23 NOTE — DIETITIAN INITIAL EVALUATION ADULT - ADD RECOMMEND
1. As medically feasible, recommend reinitiate tube feeds @10mL/hr, increasing q6hrs (or as medically feasible given tolerance) until goal rate of 40mL/hr +3LPS is reached. Prioritize assessing s/s tolerance, increasing feeds only as tolerated. Please see below  for full EN goal rate:   Nepro@ 40mL/hr x 24 hours + 3LPS. This provides: 960mL TV,  2028kcal,  123g protein, 698mL free water. Meetinkcal/kg,  1.6g/kg protein based on ABW  77.1kg.   2.Defer fluids to team. Maintain aspiration precautions. Monitor s/s of intolerance; adjust EN as needed.   3. Monitor lytes, replete prn. Monitor BG.   4. Trend weekly wts. Monitor skin integrity, s/sx GI distress.   5. Pain/BM regimen per team   6. RD diet edu prn.   7. RD to remain available for additional nutrition interventions as needed.

## 2023-03-23 NOTE — PROGRESS NOTE ADULT - SUBJECTIVE AND OBJECTIVE BOX
Hemoglobin: 8.4 g/dL (03-23-23 @ 11:16)  Phosphorus Level, Serum: 3.1 mg/dL (03-23-23 @ 11:16)  Phosphorus Level, Serum: 3.9 mg/dL (03-23-23 @ 04:48)  Hemoglobin: 9.2 g/dL (03-23-23 @ 04:48)    Albumin, Serum: 2.1 g/dL (03-23-23 @ 11:16)  Albumin, Serum: 2.3 g/dL (03-23-23 @ 04:48)    T(C): 37.3 (03-23-23 @ 14:19), Max: 37.3 (03-23-23 @ 14:19)  HR: 108 (03-23-23 @ 15:16) (91 - 108)  BP: --  RR: 14 (03-23-23 @ 13:00) (14 - 24)  SpO2: 100% (03-23-23 @ 15:16) (91% - 100%)      CRRT Treatment:   Modality: CVVHD, Filter: NxStage CAR-505, Target Blood Flow: 300 mL/Min  Target Fluid Balance: Titrate to net NEGATIVE fluid balance, 150 mL/Hr (03-23-23 @ 09:43) [Active]  Phoxillum Filtration BK 4 / 2.5: Solution, 5000 milliLiter(s) infuse at 2500 mL/Hr; infuse through CRRT Circuit  Administration Instructions: Continuous Renal Replacement Therapy (CRRT)  Special Instructions: Dialysate (03-23-23 @ 09:43) [Active]      Seen on CVVHD which was stopped as pt was transported for CT. Pt just returned and CVVHD being restarted now. Will continue at 150ml/hr net negative for now.

## 2023-03-23 NOTE — DIETITIAN INITIAL EVALUATION ADULT - NSFNSGIIOFT_GEN_A_CORE
03-22-23 @ 07:01  -  03-23-23 @ 07:00  --------------------------------------------------------  OUT:    Nasogastric/Oral tube (mL): 50 mL  Total OUT: 50 mL    Total NET: 75 mL      03-23-23 @ 07:01  -  03-23-23 @ 19:57  --------------------------------------------------------  OUT:    Nasogastric/Oral tube (mL): 0 mL  Total OUT: 0 mL    Total NET: 80 mL

## 2023-03-23 NOTE — PROGRESS NOTE ADULT - SUBJECTIVE AND OBJECTIVE BOX
Subjective  No events overnight. No complaints currently.    ROS  As above, otherwise negative for constitutional/HEENT/CV/pulm/GI//MSK/neuro/derm/endocrine/psych.     MEDICATIONS  (STANDING):  aspirin  chewable 81 milliGRAM(s) Enteral Tube daily  chlorhexidine 0.12% Liquid 15 milliLiter(s) Oral Mucosa every 12 hours  chlorhexidine 2% Cloths 1 Application(s) Topical daily  CRRT Treatment    <Continuous>  dextrose 10%. 250 milliLiter(s) (500 mL/Hr) IV Continuous <Continuous>  dextrose 10%. 50 milliLiter(s) (10 mL/Hr) IV Continuous <Continuous>  dextrose 5%. 1000 milliLiter(s) (50 mL/Hr) IV Continuous <Continuous>  dextrose 5%. 1000 milliLiter(s) (100 mL/Hr) IV Continuous <Continuous>  dextrose 50% Injectable 50 milliLiter(s) IV Push every 15 minutes  dextrose 50% Injectable 25 milliLiter(s) IV Push every 15 minutes  glucagon  Injectable 1 milliGRAM(s) IntraMuscular once  insulin lispro (ADMELOG) corrective regimen sliding scale   SubCutaneous three times a day before meals  insulin lispro (ADMELOG) corrective regimen sliding scale   SubCutaneous at bedtime  lacosamide IVPB 100 milliGRAM(s) IV Intermittent every 12 hours  milrinone Infusion 0.25 MICROgram(s)/kG/Min (5.78 mL/Hr) IV Continuous <Continuous>  pantoprazole  Injectable 40 milliGRAM(s) IV Push daily  phenylephrine    Infusion 0.2 MICROgram(s)/kG/Min (5.78 mL/Hr) IV Continuous <Continuous>  propofol Infusion 5 MICROgram(s)/kG/Min (2.31 mL/Hr) IV Continuous <Continuous>  PureFlow Dialysate RFP-400 (K 2 / Ca 3) 5000 milliLiter(s) (3000 mL/Hr) CRRT <Continuous>  sodium chloride 0.9%. 1000 milliLiter(s) (10 mL/Hr) IV Continuous <Continuous>  valproate sodium  IVPB 750 milliGRAM(s) IV Intermittent every 12 hours  vasopressin Infusion 0.03 Unit(s)/Min (4.5 mL/Hr) IV Continuous <Continuous>    MEDICATIONS  (PRN):  dextrose Oral Gel 15 Gram(s) Oral once PRN Blood Glucose LESS THAN 70 milliGRAM(s)/deciliter      T(C): 37 (03-23-23 @ 05:09), Max: 37 (03-23-23 @ 05:09)  HR: 102 (03-23-23 @ 08:33) (91 - 106)  BP: --  RR: 17 (03-23-23 @ 08:00) (14 - 24)  SpO2: 100% (03-23-23 @ 08:33) (91% - 100%)  Wt(kg): --    Eyes open spontaneously. Conversational with appropriate sentences.  EOMI. Visual fields full. PERRL 3>2. Face symmetrical.  Full strength throughout.  Finger-nose-finger intact R/L.  Intact to light touch throughout.    CBC Full  -  ( 23 Mar 2023 04:48 )  WBC Count : 13.65 K/uL  RBC Count : 2.89 M/uL  Hemoglobin : 9.2 g/dL  Hematocrit : 25.3 %  Platelet Count - Automated : 31 K/uL  Mean Cell Volume : 87.5 fl  Mean Cell Hemoglobin : 31.8 pg  Mean Cell Hemoglobin Concentration : 36.4 gm/dL  Auto Neutrophil # : x  Auto Lymphocyte # : x  Auto Monocyte # : x  Auto Eosinophil # : x  Auto Basophil # : x  Auto Neutrophil % : x  Auto Lymphocyte % : x  Auto Monocyte % : x  Auto Eosinophil % : x  Auto Basophil % : x    03-23    134<L>  |  102  |  19  ----------------------------<  81  4.2   |  24  |  1.83<H>    Ca    7.8<L>      23 Mar 2023 04:48  Phos  3.9     03-23  Mg     1.8     03-23    TPro  4.3<L>  /  Alb  2.3<L>  /  TBili  0.9  /  DBili  x   /  AST  263<H>  /  ALT  36  /  AlkPhos  59  03-23    LIVER FUNCTIONS - ( 23 Mar 2023 04:48 )  Alb: 2.3 g/dL / Pro: 4.3 g/dL / ALK PHOS: 59 U/L / ALT: 36 U/L / AST: 263 U/L / GGT: x           PT/INR - ( 23 Mar 2023 04:48 )   PT: 16.4 sec;   INR: 1.37          PTT - ( 23 Mar 2023 04:48 )  PTT:37.2 sec      EEG:

## 2023-03-23 NOTE — DIETITIAN INITIAL EVALUATION ADULT - PERTINENT LABORATORY DATA
03-23    132<L>  |  101  |  19  ----------------------------<  106<H>  3.5   |  26  |  2.10<H>    Ca    7.8<L>      23 Mar 2023 17:19  Phos  2.8     03-23  Mg     2.1     03-23    TPro  4.4<L>  /  Alb  2.3<L>  /  TBili  1.1  /  DBili  x   /  AST  217<H>  /  ALT  24  /  AlkPhos  66  03-23  POCT Blood Glucose.: 101 mg/dL (03-23-23 @ 17:16)  A1C with Estimated Average Glucose Result: 4.4 % (03-09-23 @ 09:33)

## 2023-03-23 NOTE — PROGRESS NOTE ADULT - SUBJECTIVE AND OBJECTIVE BOX
Patient is a 53y Male seen and evaluated at bedside. Remains intubated, sedated, on low dose phenylephrine. CVVHD running overnight without any complications. UF rate increased to 200ml/hr by ICU this AM given concerns of vol overload      Meds:    albumin human 25% IVPB 50 every 8 hours  aspirin  chewable 81 daily  chlorhexidine 0.12% Liquid 15 every 12 hours  chlorhexidine 2% Cloths 1 daily  CRRT Treatment  <Continuous>  CRRT Treatment  <Continuous>  dextrose 10%. 250 <Continuous>  dextrose 10%. 50 <Continuous>  dextrose 5%. 1000 <Continuous>  dextrose 5%. 1000 <Continuous>  dextrose 50% Injectable 50 every 15 minutes  dextrose 50% Injectable 25 every 15 minutes  dextrose Oral Gel 15 once PRN  glucagon  Injectable 1 once  insulin lispro (ADMELOG) corrective regimen sliding scale  three times a day before meals  insulin lispro (ADMELOG) corrective regimen sliding scale  at bedtime  lacosamide IVPB 100 every 12 hours  magnesium sulfate  IVPB 2 once  milrinone Infusion 0.25 <Continuous>  pantoprazole  Injectable 40 daily  phenylephrine    Infusion 0.2 <Continuous>  Phoxillum Filtration BK 4 / 2.5 5000 <Continuous>  propofol Infusion 5 <Continuous>  PureFlow Dialysate RFP-400 (K 2 / Ca 3) 5000 <Continuous>  sodium chloride 0.9%. 1000 <Continuous>  valproate sodium  IVPB 750 every 12 hours  vasopressin Infusion 0.03 <Continuous>      T(C): , Max: 37.1 (03-23-23 @ 09:40)  T(F): , Max: 98.8 (03-23-23 @ 09:40)  HR: 102 (03-23-23 @ 10:00)  BP: --  BP(mean): --  RR: 18 (03-23-23 @ 10:00)  SpO2: 100% (03-23-23 @ 10:00)  Wt(kg): --    03-22 @ 07:01  -  03-23 @ 07:00  --------------------------------------------------------  IN: 1859.7 mL / OUT: 4537 mL / NET: -2677.3 mL    03-23 @ 07:01 - 03-23 @ 11:13  --------------------------------------------------------  IN: 857.2 mL / OUT: 393 mL / NET: 464.2 mL          Review of Systems:  ROS negative except as per HPI      PHYSICAL EXAM:  GENERAL: Intubated, sedated  CHEST/LUNG: Occasional rhonchi B/L, on MV  HEART: Regular rate and rhythm, no murmur  ABDOMEN: Soft, nondistended  : Cantrell in place  EXTREMITIES: 1+ pitting edema all extremities  Neurology: Sedated  ACCESS: Lt fem temp HD cath      LABS:                        9.2    13.65 )-----------( 31       ( 23 Mar 2023 04:48 )             25.3     03-23    134<L>  |  102  |  19  ----------------------------<  81  4.2   |  24  |  1.83<H>    Ca    7.8<L>      23 Mar 2023 04:48  Phos  3.9     03-23  Mg     1.8     03-23    TPro  4.3<L>  /  Alb  2.3<L>  /  TBili  0.9  /  DBili  x   /  AST  263<H>  /  ALT  36  /  AlkPhos  59  03-23      PT/INR - ( 23 Mar 2023 04:48 )   PT: 16.4 sec;   INR: 1.37          PTT - ( 23 Mar 2023 04:48 )  PTT:37.2 sec          RADIOLOGY & ADDITIONAL STUDIES:

## 2023-03-23 NOTE — DIETITIAN INITIAL EVALUATION ADULT - OTHER INFO
53 year old  Male with expanding aortic arch and descending aortic aneurysm. S/P AVR. Aortic arch replacement. S/P CABG (SVG to RCA).. S/P Aorto left axillary by pass. S/P TEVAR to descending aorta.    Pt seen at bedside for initial assessment. Pt remains vented ACVC mode. MAP 80. TMax 99.2F.  Per RN, last bowel movement today 3/23, loose and large. Per chart, NKFA. Unable to obtain wt/appetite history PTA. No edema documented at this time. No pressure ulcers documented at this time. Snadro score=. Nutrition focused physical exam not warranted at this time as pt reported no change in appetite, no recent weight loss, and observed pt with no overt signs of muscle or fat wasting. Based on ASPEN guidelines, pt does not meet criteria for malnutrition at this time. Discussed current diet order; provided pt with diet education regarding ; amenable to education. Pt reports feeling hungry during hospital stay, able to complete % of meals. Pt reports no cultural/Faith or other food preferences. Made aware RD remains available. RD to follow up. See nutrition recommendations below.  53 year old  Male with expanding aortic arch and descending aortic aneurysm. S/P AVR. Aortic arch replacement. S/P CABG (SVG to RCA).. S/P Aorto left axillary by pass. S/P TEVAR to descending aorta.    Pt seen at bedside for initial assessment. Per RN, propofol is held at this time. Pt on vasopressin. Pt remains vented ACVC mode. MAP 80. TMax 99.2F.  Per RN, last bowel movement today 3/23, loose and large. Per chart, NKFA. Unable to obtain wt/appetite history PTA. Pt with edema: scrotal 2+, generalized 2+, left elbow 3+. No pressure ulcers documented at this time. Sandro score=14. No overt signs/symptoms of muscle/fat wasting are present at this time. Based on ASPEN guidelines, pt does not meet criteria for malnutrition at this time. Edu deferred. Of note, pt's feeds were stopped due to residuals of 250mL at 11am. RN was checking residuals at time of visit (around 445pm) and residuals were 400mLs.  Gastric fluids likely building up. Recommending restarting feeds and aiming for lower goal rate, see EN recs below. Unable to obtain cultural/Hinduism or other food preferences. Made aware RD remains available. RD to follow up. See nutrition recommendations below.

## 2023-03-23 NOTE — PROGRESS NOTE ADULT - SUBJECTIVE AND OBJECTIVE BOX
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 Patient underwent replacement of transverse aortic arch, second stage thoracic endovascular aortic repair, aorto-axillary bypass, AV replacement (bio 23mm), and CABG x 1 (SVG-RCA) EF 75% with Dr. Pierre 3/20. Patient received 7 units pRBC, 6 FFP, 4 plt, 15 cryo, 1000 FEIBA, and uo 1300cc. Started on lasix infusion and phenylephrine, bicarb, Armaan, levo and vaso, with lactic acidosis. 3/21 L femoral HD cath placed and CVVHD started with improvement in acidosis. Pressors off by end of day. Primacor weaned overnight. Patient woke up and follows commands, exhibited facial twitching cw prior seizures per wife and ativan given followed by vEEG placement. Neurology consulted for recs given subtherapeutic AED levels. Given 2 units pRBC. 3/22 Neurology recommended dc vEEG given low suspicion for seizures. Fluid removal initiated with CVVHD. Overnight poor oxygenation requiring bronchoscopy, on laquita and vaso, D10 started for hypoglycemia. 3/23 large residuals, reglan started. CTH performed for poor mentation-no bleed. Amio given for afib.     FAMILY HISTORY:  No pertinent family history in first degree relatives  PAST MEDICAL & SURGICAL HISTORY:  HTN (hypertension)  Aortic dissection  CAD (coronary artery disease)  Seizure disorder  S/P aortic bifurcation bypass graft  S/P CABG x 1        Patient is a 53y old  Male who presents with a chief complaint of Z98.890     (23 Mar 2023 19:57)      14 system review was unremarkable    Vital signs, hemodynamic and respiratory parameters were reviewed from the bedside nursing flowsheet.  ICU Vital Signs Last 24 Hrs  T(C): 36.3 (23 Mar 2023 22:44), Max: 37.3 (23 Mar 2023 14:19)  T(F): 97.3 (23 Mar 2023 22:44), Max: 99.2 (23 Mar 2023 14:19)  HR: 105 (24 Mar 2023 02:00) (95 - 108)  BP: --  BP(mean): --  ABP: 102/61 (24 Mar 2023 02:00) (85/53 - 156/67)  ABP(mean): 78 (24 Mar 2023 02:00) (60 - 92)  RR: 17 (24 Mar 2023 02:00) (14 - 20)  SpO2: 100% (24 Mar 2023 02:00) (92% - 100%)    O2 Parameters below as of 24 Mar 2023 02:00  Patient On (Oxygen Delivery Method): ventilator    O2 Concentration (%): 50      Adult Advanced Hemodynamics Last 24 Hrs  CVP(mm Hg): 12 (24 Mar 2023 02:00) (4 - 312)  CVP(cm H2O): --  CO: 5.3 (24 Mar 2023 00:00) (5 - 6)  CI: 2.6 (24 Mar 2023 00:00) (2.5 - 3)  PA: 25/13 (24 Mar 2023 02:00) (13/5 - 29/19)  PA(mean): 19 (24 Mar 2023 02:00) (9 - 24)  PCWP: --  SVR: 994 (24 Mar 2023 00:00) (892 - 1103)  SVRI: 2028 (24 Mar 2023 00:00) (1784 - 2192)  PVR: --  PVRI: --, ABG - ( 23 Mar 2023 23:28 )  pH, Arterial: 7.45  pH, Blood: x     /  pCO2: 34    /  pO2: 119   / HCO3: 24    / Base Excess: 0.1   /  SaO2: 99.2              Mode: AC/ CMV (Assist Control/ Continuous Mandatory Ventilation)  RR (machine): 14  TV (machine): 650  FiO2: 50  PEEP: 8  ITime: 1  MAP: 13  PIP: 24    Intake and output was reviewed and the fluid balance was calculated  Daily     Daily   I&O's Summary    22 Mar 2023 07:01  -  23 Mar 2023 07:00  --------------------------------------------------------  IN: 1859.7 mL / OUT: 4537 mL / NET: -2677.3 mL    23 Mar 2023 07:01  -  24 Mar 2023 02:17  --------------------------------------------------------  IN: 3098.7 mL / OUT: 3500 mL / NET: -401.3 mL        All lines and drain sites were assessed  Glycemic trend was reviewed    POCT Blood Glucose.: 83 mg/dL (23 Mar 2023 23:24)      Neuro: sedated  HEENT: mmm  Heart: s1 s2   Lungs: decreased bl  Abdomen: soft nt nd  Extremities: 2+dp    Lines:  RIJ Cordis with [place] 3/20  R radial arterial line 3/20  L femoral arterial line 3/20  L femoral HD catheter 3/21    Tubes:  Med bella x 3  Pleural bella x 2    labs  CBC Full  -  ( 23 Mar 2023 23:32 )  WBC Count : 12.68 K/uL  RBC Count : 2.53 M/uL  Hemoglobin : 7.9 g/dL  Hematocrit : 22.4 %  Platelet Count - Automated : 47 K/uL  Mean Cell Volume : 88.5 fl  Mean Cell Hemoglobin : 31.2 pg  Mean Cell Hemoglobin Concentration : 35.3 gm/dL  Auto Neutrophil # : x  Auto Lymphocyte # : x  Auto Monocyte # : x  Auto Eosinophil # : x  Auto Basophil # : x  Auto Neutrophil % : x  Auto Lymphocyte % : x  Auto Monocyte % : x  Auto Eosinophil % : x  Auto Basophil % : x    03-23    134<L>  |  102  |  25<H>  ----------------------------<  82  4.4   |  22  |  2.11<H>    Ca    8.0<L>      23 Mar 2023 23:32  Phos  2.9     03-23  Mg     2.1     03-23    TPro  4.4<L>  /  Alb  2.2<L>  /  TBili  1.1  /  DBili  x   /  AST  184<H>  /  ALT  19  /  AlkPhos  70  03-23    PT/INR - ( 23 Mar 2023 23:32 )   PT: 12.5 sec;   INR: 1.05          PTT - ( 23 Mar 2023 23:32 )  PTT:35.8 sec  The current medications were reviewed   MEDICATIONS  (STANDING):  albumin human 25% IVPB 50 milliLiter(s) IV Intermittent every 8 hours  aMIOdarone Infusion 1 mG/Min (33.3 mL/Hr) IV Continuous <Continuous>  aspirin  chewable 81 milliGRAM(s) Enteral Tube daily  chlorhexidine 0.12% Liquid 15 milliLiter(s) Oral Mucosa every 12 hours  chlorhexidine 2% Cloths 1 Application(s) Topical daily  CRRT Treatment    <Continuous>  dextrose 10%. 250 milliLiter(s) (500 mL/Hr) IV Continuous <Continuous>  dextrose 10%. 50 milliLiter(s) (10 mL/Hr) IV Continuous <Continuous>  dextrose 5%. 1000 milliLiter(s) (50 mL/Hr) IV Continuous <Continuous>  dextrose 5%. 1000 milliLiter(s) (100 mL/Hr) IV Continuous <Continuous>  dextrose 50% Injectable 50 milliLiter(s) IV Push every 15 minutes  dextrose 50% Injectable 25 milliLiter(s) IV Push every 15 minutes  glucagon  Injectable 1 milliGRAM(s) IntraMuscular once  insulin lispro (ADMELOG) corrective regimen sliding scale   SubCutaneous three times a day before meals  insulin lispro (ADMELOG) corrective regimen sliding scale   SubCutaneous at bedtime  lacosamide IVPB 100 milliGRAM(s) IV Intermittent every 12 hours  metoclopramide Injectable 10 milliGRAM(s) IV Push every 12 hours  milrinone Infusion 0.25 MICROgram(s)/kG/Min (5.78 mL/Hr) IV Continuous <Continuous>  pantoprazole  Injectable 40 milliGRAM(s) IV Push daily  phenylephrine    Infusion 0.2 MICROgram(s)/kG/Min (5.78 mL/Hr) IV Continuous <Continuous>  Phoxillum Filtration BK 4 / 2.5 5000 milliLiter(s) (2500 mL/Hr) CRRT <Continuous>  propofol Infusion 5 MICROgram(s)/kG/Min (2.31 mL/Hr) IV Continuous <Continuous>  sodium chloride 0.9%. 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     POD3 replacement of transverse aortic arch, second stage thoracic endovascular aortic repair, aorto-axillary bypass, AV replacement (bio 23mm), and CABG x 1 (SVG-RCA) (EF 75%, [place], 3/20)  RASS goal 0/-1, wean sedation to assess neuro status  Acute respiratory failure requiring mechanical ventilation-keep  Cardiogenic shock requiring primacor-keep for now  Serial Ci/CO, trend end organ perfusion markers  CVVHD for volume removal, net negative 1-2L  on Armaan-wean   Continue AEDs  Acute post operative anemia-trend H/H, 1 unit prbc now  Thrombocytopenia-monitor, sp 1 unit plt 3/22  Mild LFT elevation-trend  Trend CK  TREY now  Replete lytes prn  Monitor CT output  Tube feeds, increase to goal as tolerated  GI/DVT PPX  Bowel Regimen  Pain control  Close hemodynamic, ventilatory and drain monitoring and management per post op routine  Monitor for arrhythmias and monitor parameters for organ perfusion  Beta blockade not administered due to hemodynamic instability and bradycardia  Monitor neurologic status  Head of the bed should remain elevated to 45 deg   Chest PT and IS will be encouraged  Monitor adequacy of oxygenation and ventilation and attempt to wean oxygen  Antibiotic regimen will be tailored to the clinical, laboratory and microbiologic data  Nutritional goals will be met using po eventually, ensure adequate caloric intake and monitor the same  Stress ulcer and VTE prophylaxis will be achieved    Glycemic control is satisfactory  Electrolytes have been repleted as necessary and wound care has been carried out   Pain control has been achieved.   Aggressive physical therapy and early mobility and ambulation goals will be met   The family was updated about the course and plan.    CRITICAL CARE TIME personally provided by me  in evaluation and management, reassessments, review and interpretation of labs and x-rays, ventilator and hemodynamic management, formulating a plan and coordinating care: ___30____ MIN.  Time does not include procedural time.    CTICU ATTENDING     					  Alexx Brooks MD

## 2023-03-23 NOTE — DIETITIAN INITIAL EVALUATION ADULT - PERTINENT MEDS FT
MEDICATIONS  (STANDING):  albumin human 25% IVPB 50 milliLiter(s) IV Intermittent every 8 hours  aMIOdarone Infusion 1 mG/Min (33.3 mL/Hr) IV Continuous <Continuous>  aspirin  chewable 81 milliGRAM(s) Enteral Tube daily  chlorhexidine 0.12% Liquid 15 milliLiter(s) Oral Mucosa every 12 hours  chlorhexidine 2% Cloths 1 Application(s) Topical daily  CRRT Treatment    <Continuous>  dextrose 10%. 250 milliLiter(s) (500 mL/Hr) IV Continuous <Continuous>  dextrose 10%. 50 milliLiter(s) (10 mL/Hr) IV Continuous <Continuous>  dextrose 5%. 1000 milliLiter(s) (50 mL/Hr) IV Continuous <Continuous>  dextrose 5%. 1000 milliLiter(s) (100 mL/Hr) IV Continuous <Continuous>  dextrose 50% Injectable 50 milliLiter(s) IV Push every 15 minutes  dextrose 50% Injectable 25 milliLiter(s) IV Push every 15 minutes  glucagon  Injectable 1 milliGRAM(s) IntraMuscular once  insulin lispro (ADMELOG) corrective regimen sliding scale   SubCutaneous three times a day before meals  insulin lispro (ADMELOG) corrective regimen sliding scale   SubCutaneous at bedtime  lacosamide IVPB 100 milliGRAM(s) IV Intermittent every 12 hours  metoclopramide Injectable 10 milliGRAM(s) IV Push every 12 hours  milrinone Infusion 0.25 MICROgram(s)/kG/Min (5.78 mL/Hr) IV Continuous <Continuous>  pantoprazole  Injectable 40 milliGRAM(s) IV Push daily  phenylephrine    Infusion 0.2 MICROgram(s)/kG/Min (5.78 mL/Hr) IV Continuous <Continuous>  Phoxillum Filtration BK 4 / 2.5 5000 milliLiter(s) (2500 mL/Hr) CRRT <Continuous>  potassium chloride  20 mEq/100 mL IVPB 20 milliEquivalent(s) IV Intermittent every 1 hour  propofol Infusion 5 MICROgram(s)/kG/Min (2.31 mL/Hr) IV Continuous <Continuous>  sodium chloride 0.9%. 1000 milliLiter(s) (10 mL/Hr) IV Continuous <Continuous>  valproate sodium  IVPB 750 milliGRAM(s) IV Intermittent every 12 hours  vasopressin Infusion 0.03 Unit(s)/Min (4.5 mL/Hr) IV Continuous <Continuous>    MEDICATIONS  (PRN):  dextrose Oral Gel 15 Gram(s) Oral once PRN Blood Glucose LESS THAN 70 milliGRAM(s)/deciliter

## 2023-03-23 NOTE — PROGRESS NOTE ADULT - ASSESSMENT
54 yo M w/ HTN, seizure disorder and no underlying CKD who is s/p scheduled replacement of aortic arch and TEVAR on 3/20. Prolonger surgery with significant amount of blood products and fluids recieved intra-op. Post-op pt intubated, on pressors, with severe acidosis. Started on CVVHD 3/21 for refractory acidosis, tolerating CVVHD without any complications. Pressor requirement significantly decreased but pt remains on high FiO2 (3/23).    #Seen on CVVHD    #Non-oliguric iATN due to intra-op hemodynamic changes (pt requiring 7 units pRBC intra-op and post-op pt in shock. Shock now improved)  BCr 1.2, eGFR 67 (3/9)  UA w/ trace proteinuria and large blood w/o RBCs. [id number] (3/21)  Net negative 2.6L (3/22-3/23) which was slightly more than our goal of net negative 2L/24 hours  C/w CVVHD for vol removal. Pt on 70% FiO2, and unable to wean down. CVP 12, PAD 13-14 this AM, however pt with third spacing of fluid and remains vol overloaded. Discussed with ICU team and will aim for net negative 2-3L/24 hours. CVVHD ordered for net negative 150ml/hr to achieve max UF during the day, will continue to monitor and if BP drops or by the end of the afternoon, will likely decrease UF to 80-100ml/hr to meet the above goal  May need oncotic pressure to help achieve above goal as currently pt with third spacing. Agree w/.  albumin 25% [id number]  OK to give lasix 80mg IV bolus to enhance vol removal  Clearance acceptable. Will continue dialysate rate at 2.5L/hr  Check phos daily. Unclear etiology of initial phos of 0.5, may be related to transfusions. Phos improved with Phoxillum dialysate 3/21, now gradually trending down so will use Phoxillum dialysate again today  In case CVVHD system clots, will restart w/ heparin drip. Goal will be APTT 1.5-2 x normal. Discussed w/ pharmacy  Met acidosis resolved. Lactate normal now  Repeat CK  Strict I&Os  BMP per icu protocol    Plan discussed with CTICU team

## 2023-03-23 NOTE — CHART NOTE - NSCHARTNOTEFT_GEN_A_CORE
Indication: hypoxia     History: S/p reop total arch replacement     Preop medication: prop/ fentanyl/ nimbex     Findings:  Bronchoscope inserted through size 8.0 ETT. Airway evaluation revealed Sharp Cristal. MARCO and LLL evaluation revealed clean patent airways to the level of subsegments mild clear thin secretions aspirated. Right main stem with adherent mucus plugging required lavage, aspirated after few attempts-downstream airways clear and patent with minimal secretions. ETT noted to be in good position. Bronchoscope then withdrawn from ETT. No bleeding noted.     Specimens: None     EBL: None

## 2023-03-23 NOTE — PROGRESS NOTE ADULT - SUBJECTIVE AND OBJECTIVE BOX
CTICU  CRITICAL  CARE  attending     Hand off received 					   Pertinent clinical, laboratory, radiographic, hemodynamic, echocardiographic, respiratory data, microbiologic data and chart were reviewed and analyzed frequently throughout the course of the day and night  Patient seen and examined with CTS/ SH attending at bedside    Pt is a 53y , Male, post op day # 3 s/p AVR (t); Total arch replacement; CABG x 1; Aorto-axillary bypass    post op    remains intubated  slightly improving A-a gradient  Acute renal failure; requiring RRT; with CVVHD  pressor/inotrope support  Armaan @ 20 ppm  Poor mentation-encephalopathy -off sedation today      52 year old male, current every day marijuana use with a past medical hx of HTN, type A aortic dissection s/p Dacron grafts, AV resuspension in 2013,CAD s/p CABG x 1 SVG to RCA (5/2013 with Dr. Medina), seizure disorder (last episode on 7/4/22) presents for a follow up visit for evaluation and management of his aortic pathology. Screen failed for Triomphe study     CTA chest, abdomen and pelvis 11/30/22  Status post graft repair of the ascending aorta and portion of aortic arch.  Dissecting aneurysm of the descending thoracic aorta (measuring up to 5.7 cm) and abdominal aorta (3.5 cm infrarenal), similar to the MR angiogram of 9/19/2022.  Nonocclusive dissection flap present within the innominate artery, aneurysmal right subclavian artery and proximal right common carotid artery as well as aneurysmal left common iliac artery.    Patient denies fever, chills, fatigue, headache, blurred vision, dizziness, syncope, palpitations, shortness of breath, orthopnea, paroxysmal nocturnal dyspnea, nausea, vomiting, abdominal pain, back pain, BRBPR or swelling to legs.    Patient s/p diagnostic cardiac cath (3/9/23): SVG-RCA patent, remaining vessels luminal irregularities. ACCESS: L radial w/ TR band for hemostasis.     The patient was evaluated by CT surgery and is deemed a candidate for a reop total arch replacement with Thoraflex.      , FAMILY HISTORY:  No pertinent family history in first degree relatives    PAST MEDICAL & SURGICAL HISTORY:  HTN (hypertension)      Aortic dissection      CAD (coronary artery disease)      Seizure disorder      S/P aortic bifurcation bypass graft      S/P CABG x 1        Patient is a 53y old  Male who presents with a chief complaint of expanding aortic arch and descending aortic aneurysm (23 Mar 2023 11:13)      14 system review unable to assess  acute changes include acute respiratory failure  Vital signs, hemodynamic and respiratory parameters were reviewed from the bedside nursing flowsheet.  ICU Vital Signs Last 24 Hrs  T(C): 37.1 (23 Mar 2023 09:40), Max: 37.1 (23 Mar 2023 09:40)  T(F): 98.8 (23 Mar 2023 09:40), Max: 98.8 (23 Mar 2023 09:40)  HR: 104 (23 Mar 2023 13:00) (91 - 106)  BP: --  BP(mean): --  ABP: 108/49 (23 Mar 2023 13:00) (76/47 - 148/69)  ABP(mean): 76 (23 Mar 2023 13:00) (55 - 95)  RR: 14 (23 Mar 2023 13:00) (14 - 24)  SpO2: 99% (23 Mar 2023 13:00) (91% - 100%)    O2 Parameters below as of 23 Mar 2023 13:00  Patient On (Oxygen Delivery Method): ventilator    O2 Concentration (%): 50      Adult Advanced Hemodynamics Last 24 Hrs  CVP(mm Hg): 10 (23 Mar 2023 13:00) (2 - 12)  CVP(cm H2O): --  CO: 6 (23 Mar 2023 11:00) (3.3 - 6)  CI: 3 (23 Mar 2023 11:00) (1.6 - 3)  PA: 19/7 (23 Mar 2023 13:00) (19/7 - 30/20)  PA(mean): 12 (23 Mar 2023 13:00) (12 - 26)  PCWP: --  SVR: 892 (23 Mar 2023 11:00) (892 - 1598)  SVRI: 1784 (23 Mar 2023 11:00) (1784 - 3272)  PVR: --  PVRI: --, ABG - ( 23 Mar 2023 11:38 )  pH, Arterial: 7.49  pH, Blood: x     /  pCO2: 33    /  pO2: 157   / HCO3: 25    / Base Excess: 2.2   /  SaO2: 99.7              Mode: AC/ CMV (Assist Control/ Continuous Mandatory Ventilation)  RR (machine): 14  TV (machine): 650  FiO2: 70  PEEP: 5  ITime: 1  MAP: 15  PIP: 25    Intake and output was reviewed and the fluid balance was calculated  Daily     Daily   I&O's Summary    22 Mar 2023 07:01  -  23 Mar 2023 07:00  --------------------------------------------------------  IN: 1859.7 mL / OUT: 4537 mL / NET: -2677.3 mL    23 Mar 2023 07:01  -  23 Mar 2023 13:55  --------------------------------------------------------  IN: 929.6 mL / OUT: 1050 mL / NET: -120.4 mL        All lines and drain sites were assessed  Glycemic [facility]      POCT Blood Glucose.: 192 mg/dL (23 Mar 2023 11:30)    No acute change in mental status  (+) Orotracheally intubated  Auscultation of the chest reveals equal bs  Abdomen is soft  Extremities are warm and well perfused  Wounds appear clean and unremarkable  Antibiotics are periop    labs  CBC Full  -  ( 23 Mar 2023 11:16 )  WBC Count : 12.73 K/uL  RBC Count : 2.69 M/uL  Hemoglobin : 8.4 g/dL  Hematocrit : 23.7 %  Platelet Count - Automated : 76 K/uL  Mean Cell Volume : 88.1 fl  Mean Cell Hemoglobin : 31.2 pg  Mean Cell Hemoglobin Concentration : 35.4 gm/dL  Auto Neutrophil # : x  Auto Lymphocyte # : x  Auto Monocyte # : x  Auto Eosinophil # : x  Auto Basophil # : x  Auto Neutrophil % : x  Auto Lymphocyte % : x  Auto Monocyte % : x  Auto Eosinophil % : x  Auto Basophil % : x    03-23    133<L>  |  101  |  22  ----------------------------<  115<H>  3.7   |  26  |  1.84<H>    Ca    8.2<L>      23 Mar 2023 11:16  Phos  3.1     03-23  Mg     2.3     03-23    TPro  4.6<L>  /  Alb  2.1<L>  /  TBili  0.9  /  DBili  x   /  AST  225<H>  /  ALT  28  /  AlkPhos  63  03-23    PT/INR - ( 23 Mar 2023 11:16 )   PT: 13.7 sec;   INR: 1.15          PTT - ( 23 Mar 2023 11:16 )  PTT:37.6 sec  The current medications were reviewed   MEDICATIONS  (STANDING):  albumin human 25% IVPB 50 milliLiter(s) IV Intermittent every 8 hours  aspirin  chewable 81 milliGRAM(s) Enteral Tube daily  chlorhexidine 0.12% Liquid 15 milliLiter(s) Oral Mucosa every 12 hours  chlorhexidine 2% Cloths 1 Application(s) Topical daily  CRRT Treatment    <Continuous>  dextrose 10%. 250 milliLiter(s) (500 mL/Hr) IV Continuous <Continuous>  dextrose 10%. 50 milliLiter(s) (10 mL/Hr) IV Continuous <Continuous>  dextrose 5%. 1000 milliLiter(s) (50 mL/Hr) IV Continuous <Continuous>  dextrose 5%. 1000 milliLiter(s) (100 mL/Hr) IV Continuous <Continuous>  dextrose 50% Injectable 50 milliLiter(s) IV Push every 15 minutes  dextrose 50% Injectable 25 milliLiter(s) IV Push every 15 minutes  glucagon  Injectable 1 milliGRAM(s) IntraMuscular once  insulin lispro (ADMELOG) corrective regimen sliding scale   SubCutaneous three times a day before meals  insulin lispro (ADMELOG) corrective regimen sliding scale   SubCutaneous at bedtime  lacosamide IVPB 100 milliGRAM(s) IV Intermittent every 12 hours  metoclopramide Injectable 10 milliGRAM(s) IV Push every 12 hours  milrinone Infusion 0.25 MICROgram(s)/kG/Min (5.78 mL/Hr) IV Continuous <Continuous>  pantoprazole  Injectable 40 milliGRAM(s) IV Push daily  phenylephrine    Infusion 0.2 MICROgram(s)/kG/Min (5.78 mL/Hr) IV Continuous <Continuous>  Phoxillum Filtration BK 4 / 2.5 5000 milliLiter(s) (2500 mL/Hr) CRRT <Continuous>  propofol Infusion 5 MICROgram(s)/kG/Min (2.31 mL/Hr) IV Continuous <Continuous>  sodium chloride 0.9%. 1000 milliLiter(s) (10 mL/Hr) IV Continuous <Continuous>  valproate sodium  IVPB 750 milliGRAM(s) IV Intermittent every 12 hours  vasopressin Infusion 0.03 Unit(s)/Min (4.5 mL/Hr) IV Continuous <Continuous>    MEDICATIONS  (PRN):  dextrose Oral Gel 15 Gram(s) Oral once PRN Blood Glucose LESS THAN 70 milliGRAM(s)/deciliter       PROBLEM LIST/ ASSESSMENT:  HEALTH ISSUES - PROBLEM Dx:      ,   Patient is a 53y old  Male who presents with a chief complaint of expanding aortic arch and descending aortic aneurysm (23 Mar 2023 11:13)     s/p  AVR (t); Total arch replacement; CABG x 1; Aorto-axillary bypass  acute changes include acute respiratory failure    My plan includes :    Titrate pressor support to maintain MAP >70  Titrate inotrope support to maintain CI >2.2/MVO2 >60  Trend lactate levels  continue full Vent support  Titrate Fio2 to maintain Sao2 >95%  Serial ABGs  Poor mentation-off sedation; CT non con head to r/o intra cranial pathology    close hemodynamic, ventilatory and drain monitoring and management per post op routine    Monitor for arrhythmias and monitor parameters for organ perfusion  monitor neurologic status  Head of the bed should remain elevated to 45 deg .   chest PT and IS will be encouraged  monitor adequacy of oxygenation and ventilation and attempt to wean oxygen  Nutritional goals will be met using po eventually , ensure adequate caloric intake and montior the same  Stress ulcer and VTE prophylaxis will be achieved    Glycemic control is satisfactory  Electrolytes have been repleted as necessary and wound care has been carried out. Pain control has been achieved.   agressive physical therapy and early mobility and ambulation goals will be met   The family was updated about the course and plan  CRITICAL CARE TIME SPENT in evaluation and management, reassessments, review and interpretation of labs and x-rays, ventilator and hemodynamic management, formulating a plan and coordinating care: ___90____ MIN.  Time does not include procedural time.  CTICU ATTENDING     					    Dalton Martinez MD                        	 trace

## 2023-03-23 NOTE — DIETITIAN INITIAL EVALUATION ADULT - OTHER CALCULATIONS
%IBW: 105; ABW used to calculate estimated needs d/t pt being between 80 and 120%IBW. Adjusted for CRRT, vent status, clinical judgment, age. Fluids per team.

## 2023-03-24 LAB
ALBUMIN SERPL ELPH-MCNC: 2.1 G/DL — LOW (ref 3.3–5)
ALBUMIN SERPL ELPH-MCNC: 2.2 G/DL — LOW (ref 3.3–5)
ALBUMIN SERPL ELPH-MCNC: 2.3 G/DL — LOW (ref 3.3–5)
ALBUMIN SERPL ELPH-MCNC: 2.5 G/DL — LOW (ref 3.3–5)
ALBUMIN SERPL ELPH-MCNC: 2.6 G/DL — LOW (ref 3.3–5)
ALP SERPL-CCNC: 70 U/L — SIGNIFICANT CHANGE UP (ref 40–120)
ALP SERPL-CCNC: 74 U/L — SIGNIFICANT CHANGE UP (ref 40–120)
ALP SERPL-CCNC: 76 U/L — SIGNIFICANT CHANGE UP (ref 40–120)
ALP SERPL-CCNC: 76 U/L — SIGNIFICANT CHANGE UP (ref 40–120)
ALP SERPL-CCNC: 80 U/L — SIGNIFICANT CHANGE UP (ref 40–120)
ALT FLD-CCNC: 14 U/L — SIGNIFICANT CHANGE UP (ref 10–45)
ALT FLD-CCNC: 15 U/L — SIGNIFICANT CHANGE UP (ref 10–45)
ALT FLD-CCNC: 16 U/L — SIGNIFICANT CHANGE UP (ref 10–45)
ALT FLD-CCNC: 18 U/L — SIGNIFICANT CHANGE UP (ref 10–45)
ALT FLD-CCNC: 19 U/L — SIGNIFICANT CHANGE UP (ref 10–45)
ANION GAP SERPL CALC-SCNC: 10 MMOL/L — SIGNIFICANT CHANGE UP (ref 5–17)
ANION GAP SERPL CALC-SCNC: 10 MMOL/L — SIGNIFICANT CHANGE UP (ref 5–17)
ANION GAP SERPL CALC-SCNC: 5 MMOL/L — SIGNIFICANT CHANGE UP (ref 5–17)
ANION GAP SERPL CALC-SCNC: 7 MMOL/L — SIGNIFICANT CHANGE UP (ref 5–17)
ANION GAP SERPL CALC-SCNC: 8 MMOL/L — SIGNIFICANT CHANGE UP (ref 5–17)
APTT BLD: 33.1 SEC — SIGNIFICANT CHANGE UP (ref 27.5–35.5)
APTT BLD: 33.7 SEC — SIGNIFICANT CHANGE UP (ref 27.5–35.5)
APTT BLD: 34.9 SEC — SIGNIFICANT CHANGE UP (ref 27.5–35.5)
APTT BLD: 35.5 SEC — SIGNIFICANT CHANGE UP (ref 27.5–35.5)
AST SERPL-CCNC: 104 U/L — HIGH (ref 10–40)
AST SERPL-CCNC: 111 U/L — HIGH (ref 10–40)
AST SERPL-CCNC: 130 U/L — HIGH (ref 10–40)
AST SERPL-CCNC: 161 U/L — HIGH (ref 10–40)
AST SERPL-CCNC: 184 U/L — HIGH (ref 10–40)
BASE EXCESS BLDA CALC-SCNC: 0.7 MMOL/L — SIGNIFICANT CHANGE UP (ref -2–3)
BASE EXCESS BLDV CALC-SCNC: -0.6 MMOL/L — SIGNIFICANT CHANGE UP (ref -2–3)
BASE EXCESS BLDV CALC-SCNC: -0.8 MMOL/L — SIGNIFICANT CHANGE UP (ref -2–3)
BASE EXCESS BLDV CALC-SCNC: -0.8 MMOL/L — SIGNIFICANT CHANGE UP (ref -2–3)
BILIRUB SERPL-MCNC: 1.1 MG/DL — SIGNIFICANT CHANGE UP (ref 0.2–1.2)
BILIRUB SERPL-MCNC: 1.4 MG/DL — HIGH (ref 0.2–1.2)
BILIRUB SERPL-MCNC: 1.5 MG/DL — HIGH (ref 0.2–1.2)
BLD GP AB SCN SERPL QL: NEGATIVE — SIGNIFICANT CHANGE UP
BUN SERPL-MCNC: 25 MG/DL — HIGH (ref 7–23)
BUN SERPL-MCNC: 25 MG/DL — HIGH (ref 7–23)
BUN SERPL-MCNC: 28 MG/DL — HIGH (ref 7–23)
BUN SERPL-MCNC: 31 MG/DL — HIGH (ref 7–23)
BUN SERPL-MCNC: 35 MG/DL — HIGH (ref 7–23)
CALCIUM SERPL-MCNC: 7.8 MG/DL — LOW (ref 8.4–10.5)
CALCIUM SERPL-MCNC: 7.9 MG/DL — LOW (ref 8.4–10.5)
CALCIUM SERPL-MCNC: 7.9 MG/DL — LOW (ref 8.4–10.5)
CALCIUM SERPL-MCNC: 8 MG/DL — LOW (ref 8.4–10.5)
CALCIUM SERPL-MCNC: 8 MG/DL — LOW (ref 8.4–10.5)
CHLORIDE SERPL-SCNC: 102 MMOL/L — SIGNIFICANT CHANGE UP (ref 96–108)
CHLORIDE SERPL-SCNC: 102 MMOL/L — SIGNIFICANT CHANGE UP (ref 96–108)
CHLORIDE SERPL-SCNC: 104 MMOL/L — SIGNIFICANT CHANGE UP (ref 96–108)
CHLORIDE SERPL-SCNC: 104 MMOL/L — SIGNIFICANT CHANGE UP (ref 96–108)
CHLORIDE SERPL-SCNC: 105 MMOL/L — SIGNIFICANT CHANGE UP (ref 96–108)
CK SERPL-CCNC: 886 U/L — HIGH (ref 30–200)
CO2 BLDA-SCNC: 25 MMOL/L — HIGH (ref 19–24)
CO2 BLDV-SCNC: 24.3 MMOL/L — SIGNIFICANT CHANGE UP (ref 22–26)
CO2 BLDV-SCNC: 24.6 MMOL/L — SIGNIFICANT CHANGE UP (ref 22–26)
CO2 BLDV-SCNC: 24.6 MMOL/L — SIGNIFICANT CHANGE UP (ref 22–26)
CO2 SERPL-SCNC: 22 MMOL/L — SIGNIFICANT CHANGE UP (ref 22–31)
CO2 SERPL-SCNC: 23 MMOL/L — SIGNIFICANT CHANGE UP (ref 22–31)
CO2 SERPL-SCNC: 24 MMOL/L — SIGNIFICANT CHANGE UP (ref 22–31)
CREAT SERPL-MCNC: 1.99 MG/DL — HIGH (ref 0.5–1.3)
CREAT SERPL-MCNC: 2.11 MG/DL — HIGH (ref 0.5–1.3)
CREAT SERPL-MCNC: 2.18 MG/DL — HIGH (ref 0.5–1.3)
CREAT SERPL-MCNC: 2.41 MG/DL — HIGH (ref 0.5–1.3)
CREAT SERPL-MCNC: 2.61 MG/DL — HIGH (ref 0.5–1.3)
CULTURE RESULTS: NO GROWTH — SIGNIFICANT CHANGE UP
EGFR: 28 ML/MIN/1.73M2 — LOW
EGFR: 31 ML/MIN/1.73M2 — LOW
EGFR: 35 ML/MIN/1.73M2 — LOW
EGFR: 37 ML/MIN/1.73M2 — LOW
EGFR: 39 ML/MIN/1.73M2 — LOW
GAS PNL BLDA: SIGNIFICANT CHANGE UP
GAS PNL BLDV: SIGNIFICANT CHANGE UP
GAS PNL BLDV: SIGNIFICANT CHANGE UP
GLUCOSE BLDC GLUCOMTR-MCNC: 100 MG/DL — HIGH (ref 70–99)
GLUCOSE BLDC GLUCOMTR-MCNC: 102 MG/DL — HIGH (ref 70–99)
GLUCOSE BLDC GLUCOMTR-MCNC: 67 MG/DL — LOW (ref 70–99)
GLUCOSE BLDC GLUCOMTR-MCNC: 72 MG/DL — SIGNIFICANT CHANGE UP (ref 70–99)
GLUCOSE BLDC GLUCOMTR-MCNC: 88 MG/DL — SIGNIFICANT CHANGE UP (ref 70–99)
GLUCOSE SERPL-MCNC: 104 MG/DL — HIGH (ref 70–99)
GLUCOSE SERPL-MCNC: 108 MG/DL — HIGH (ref 70–99)
GLUCOSE SERPL-MCNC: 78 MG/DL — SIGNIFICANT CHANGE UP (ref 70–99)
GLUCOSE SERPL-MCNC: 82 MG/DL — SIGNIFICANT CHANGE UP (ref 70–99)
GLUCOSE SERPL-MCNC: 92 MG/DL — SIGNIFICANT CHANGE UP (ref 70–99)
HCO3 BLDA-SCNC: 24 MMOL/L — SIGNIFICANT CHANGE UP (ref 21–28)
HCO3 BLDV-SCNC: 23 MMOL/L — SIGNIFICANT CHANGE UP (ref 22–29)
HCO3 BLDV-SCNC: 24 MMOL/L — SIGNIFICANT CHANGE UP (ref 22–29)
HCO3 BLDV-SCNC: 24 MMOL/L — SIGNIFICANT CHANGE UP (ref 22–29)
HCT VFR BLD CALC: 23.4 % — LOW (ref 39–50)
HCT VFR BLD CALC: 24.4 % — LOW (ref 39–50)
HCT VFR BLD CALC: 25.9 % — LOW (ref 39–50)
HCT VFR BLD CALC: 26.9 % — LOW (ref 39–50)
HGB BLD-MCNC: 8.1 G/DL — LOW (ref 13–17)
HGB BLD-MCNC: 8.6 G/DL — LOW (ref 13–17)
HGB BLD-MCNC: 9 G/DL — LOW (ref 13–17)
HGB BLD-MCNC: 9.6 G/DL — LOW (ref 13–17)
INR BLD: 1 — SIGNIFICANT CHANGE UP (ref 0.88–1.16)
INR BLD: 1.02 — SIGNIFICANT CHANGE UP (ref 0.88–1.16)
INR BLD: 1.02 — SIGNIFICANT CHANGE UP (ref 0.88–1.16)
INR BLD: 1.03 — SIGNIFICANT CHANGE UP (ref 0.88–1.16)
LACTATE SERPL-SCNC: 0.8 MMOL/L — SIGNIFICANT CHANGE UP (ref 0.5–2)
LACTATE SERPL-SCNC: 0.9 MMOL/L — SIGNIFICANT CHANGE UP (ref 0.5–2)
LACTATE SERPL-SCNC: 0.9 MMOL/L — SIGNIFICANT CHANGE UP (ref 0.5–2)
LACTATE SERPL-SCNC: 1 MMOL/L — SIGNIFICANT CHANGE UP (ref 0.5–2)
MAGNESIUM SERPL-MCNC: 2.1 MG/DL — SIGNIFICANT CHANGE UP (ref 1.6–2.6)
MAGNESIUM SERPL-MCNC: 2.1 MG/DL — SIGNIFICANT CHANGE UP (ref 1.6–2.6)
MAGNESIUM SERPL-MCNC: 2.2 MG/DL — SIGNIFICANT CHANGE UP (ref 1.6–2.6)
MAGNESIUM SERPL-MCNC: 2.3 MG/DL — SIGNIFICANT CHANGE UP (ref 1.6–2.6)
MAGNESIUM SERPL-MCNC: 2.4 MG/DL — SIGNIFICANT CHANGE UP (ref 1.6–2.6)
MCHC RBC-ENTMCNC: 31 PG — SIGNIFICANT CHANGE UP (ref 27–34)
MCHC RBC-ENTMCNC: 31.1 PG — SIGNIFICANT CHANGE UP (ref 27–34)
MCHC RBC-ENTMCNC: 31.3 PG — SIGNIFICANT CHANGE UP (ref 27–34)
MCHC RBC-ENTMCNC: 31.3 PG — SIGNIFICANT CHANGE UP (ref 27–34)
MCHC RBC-ENTMCNC: 34.6 GM/DL — SIGNIFICANT CHANGE UP (ref 32–36)
MCHC RBC-ENTMCNC: 34.7 GM/DL — SIGNIFICANT CHANGE UP (ref 32–36)
MCHC RBC-ENTMCNC: 35.2 GM/DL — SIGNIFICANT CHANGE UP (ref 32–36)
MCHC RBC-ENTMCNC: 35.7 GM/DL — SIGNIFICANT CHANGE UP (ref 32–36)
MCV RBC AUTO: 87.6 FL — SIGNIFICANT CHANGE UP (ref 80–100)
MCV RBC AUTO: 88.7 FL — SIGNIFICANT CHANGE UP (ref 80–100)
MCV RBC AUTO: 89.6 FL — SIGNIFICANT CHANGE UP (ref 80–100)
MCV RBC AUTO: 89.7 FL — SIGNIFICANT CHANGE UP (ref 80–100)
NRBC # BLD: 0 /100 WBCS — SIGNIFICANT CHANGE UP (ref 0–0)
NRBC # BLD: 0 /100 WBCS — SIGNIFICANT CHANGE UP (ref 0–0)
NRBC # BLD: 1 /100 WBCS — HIGH (ref 0–0)
NRBC # BLD: 1 /100 WBCS — HIGH (ref 0–0)
PCO2 BLDA: 35 MMHG — SIGNIFICANT CHANGE UP (ref 35–48)
PCO2 BLDV: 35 MMHG — LOW (ref 42–55)
PCO2 BLDV: 37 MMHG — LOW (ref 42–55)
PCO2 BLDV: 37 MMHG — LOW (ref 42–55)
PH BLDA: 7.45 — SIGNIFICANT CHANGE UP (ref 7.35–7.45)
PH BLDV: 7.41 — SIGNIFICANT CHANGE UP (ref 7.32–7.43)
PH BLDV: 7.41 — SIGNIFICANT CHANGE UP (ref 7.32–7.43)
PH BLDV: 7.43 — SIGNIFICANT CHANGE UP (ref 7.32–7.43)
PHOSPHATE SERPL-MCNC: 2.9 MG/DL — SIGNIFICANT CHANGE UP (ref 2.5–4.5)
PHOSPHATE SERPL-MCNC: 3 MG/DL — SIGNIFICANT CHANGE UP (ref 2.5–4.5)
PHOSPHATE SERPL-MCNC: 3.3 MG/DL — SIGNIFICANT CHANGE UP (ref 2.5–4.5)
PHOSPHATE SERPL-MCNC: 3.3 MG/DL — SIGNIFICANT CHANGE UP (ref 2.5–4.5)
PHOSPHATE SERPL-MCNC: 3.5 MG/DL — SIGNIFICANT CHANGE UP (ref 2.5–4.5)
PLATELET # BLD AUTO: 26 K/UL — LOW (ref 150–400)
PLATELET # BLD AUTO: 38 K/UL — LOW (ref 150–400)
PLATELET # BLD AUTO: 63 K/UL — LOW (ref 150–400)
PLATELET # BLD AUTO: 73 K/UL — LOW (ref 150–400)
PO2 BLDA: 121 MMHG — HIGH (ref 83–108)
PO2 BLDV: 40 MMHG — SIGNIFICANT CHANGE UP (ref 25–45)
PO2 BLDV: 42 MMHG — SIGNIFICANT CHANGE UP (ref 25–45)
PO2 BLDV: 44 MMHG — SIGNIFICANT CHANGE UP (ref 25–45)
POTASSIUM SERPL-MCNC: 3.6 MMOL/L — SIGNIFICANT CHANGE UP (ref 3.5–5.3)
POTASSIUM SERPL-MCNC: 3.8 MMOL/L — SIGNIFICANT CHANGE UP (ref 3.5–5.3)
POTASSIUM SERPL-MCNC: 3.9 MMOL/L — SIGNIFICANT CHANGE UP (ref 3.5–5.3)
POTASSIUM SERPL-MCNC: 4.2 MMOL/L — SIGNIFICANT CHANGE UP (ref 3.5–5.3)
POTASSIUM SERPL-MCNC: 4.4 MMOL/L — SIGNIFICANT CHANGE UP (ref 3.5–5.3)
POTASSIUM SERPL-SCNC: 3.6 MMOL/L — SIGNIFICANT CHANGE UP (ref 3.5–5.3)
POTASSIUM SERPL-SCNC: 3.8 MMOL/L — SIGNIFICANT CHANGE UP (ref 3.5–5.3)
POTASSIUM SERPL-SCNC: 3.9 MMOL/L — SIGNIFICANT CHANGE UP (ref 3.5–5.3)
POTASSIUM SERPL-SCNC: 4.2 MMOL/L — SIGNIFICANT CHANGE UP (ref 3.5–5.3)
POTASSIUM SERPL-SCNC: 4.4 MMOL/L — SIGNIFICANT CHANGE UP (ref 3.5–5.3)
PROT SERPL-MCNC: 4.4 G/DL — LOW (ref 6–8.3)
PROT SERPL-MCNC: 4.7 G/DL — LOW (ref 6–8.3)
PROT SERPL-MCNC: 4.8 G/DL — LOW (ref 6–8.3)
PROTHROM AB SERPL-ACNC: 11.9 SEC — SIGNIFICANT CHANGE UP (ref 10.5–13.4)
PROTHROM AB SERPL-ACNC: 12.1 SEC — SIGNIFICANT CHANGE UP (ref 10.5–13.4)
PROTHROM AB SERPL-ACNC: 12.2 SEC — SIGNIFICANT CHANGE UP (ref 10.5–13.4)
PROTHROM AB SERPL-ACNC: 12.3 SEC — SIGNIFICANT CHANGE UP (ref 10.5–13.4)
RBC # BLD: 2.61 M/UL — LOW (ref 4.2–5.8)
RBC # BLD: 2.75 M/UL — LOW (ref 4.2–5.8)
RBC # BLD: 2.89 M/UL — LOW (ref 4.2–5.8)
RBC # BLD: 3.07 M/UL — LOW (ref 4.2–5.8)
RBC # FLD: 14.8 % — HIGH (ref 10.3–14.5)
RBC # FLD: 15 % — HIGH (ref 10.3–14.5)
RBC # FLD: 15.1 % — HIGH (ref 10.3–14.5)
RBC # FLD: 15.2 % — HIGH (ref 10.3–14.5)
RH IG SCN BLD-IMP: POSITIVE — SIGNIFICANT CHANGE UP
SAO2 % BLDA: 99 % — HIGH (ref 94–98)
SAO2 % BLDV: 70.5 % — SIGNIFICANT CHANGE UP (ref 67–88)
SAO2 % BLDV: 72.7 % — SIGNIFICANT CHANGE UP (ref 67–88)
SAO2 % BLDV: 73.7 % — SIGNIFICANT CHANGE UP (ref 67–88)
SODIUM SERPL-SCNC: 131 MMOL/L — LOW (ref 135–145)
SODIUM SERPL-SCNC: 134 MMOL/L — LOW (ref 135–145)
SODIUM SERPL-SCNC: 134 MMOL/L — LOW (ref 135–145)
SODIUM SERPL-SCNC: 136 MMOL/L — SIGNIFICANT CHANGE UP (ref 135–145)
SODIUM SERPL-SCNC: 137 MMOL/L — SIGNIFICANT CHANGE UP (ref 135–145)
SPECIMEN SOURCE: SIGNIFICANT CHANGE UP
UNFRACTIONATED HEPARIN INTERPRETATION: SIGNIFICANT CHANGE UP
UNFRACTIONATED HEPARIN RESULT: NEGATIVE — SIGNIFICANT CHANGE UP
UNFRACTIONATED HEPARIN-HIGH DOSE: 0 % — SIGNIFICANT CHANGE UP
UNFRACTIONATED HEPARIN-LOW DOSE: 0 % — SIGNIFICANT CHANGE UP
WBC # BLD: 10.17 K/UL — SIGNIFICANT CHANGE UP (ref 3.8–10.5)
WBC # BLD: 10.34 K/UL — SIGNIFICANT CHANGE UP (ref 3.8–10.5)
WBC # BLD: 11.16 K/UL — HIGH (ref 3.8–10.5)
WBC # BLD: 13.05 K/UL — HIGH (ref 3.8–10.5)
WBC # FLD AUTO: 10.17 K/UL — SIGNIFICANT CHANGE UP (ref 3.8–10.5)
WBC # FLD AUTO: 10.34 K/UL — SIGNIFICANT CHANGE UP (ref 3.8–10.5)
WBC # FLD AUTO: 11.16 K/UL — HIGH (ref 3.8–10.5)
WBC # FLD AUTO: 13.05 K/UL — HIGH (ref 3.8–10.5)

## 2023-03-24 RX ORDER — AMIODARONE HYDROCHLORIDE 400 MG/1
0.5 TABLET ORAL
Qty: 450 | Refills: 0 | Status: DISCONTINUED | OUTPATIENT
Start: 2023-03-24 | End: 2023-03-26

## 2023-03-24 RX ORDER — BUMETANIDE 0.25 MG/ML
2 INJECTION INTRAMUSCULAR; INTRAVENOUS EVERY 8 HOURS
Refills: 0 | Status: COMPLETED | OUTPATIENT
Start: 2023-03-24 | End: 2023-03-25

## 2023-03-24 RX ORDER — FUROSEMIDE 40 MG
20 TABLET ORAL ONCE
Refills: 0 | Status: COMPLETED | OUTPATIENT
Start: 2023-03-24 | End: 2023-03-24

## 2023-03-24 RX ORDER — FUROSEMIDE 40 MG
80 TABLET ORAL ONCE
Refills: 0 | Status: COMPLETED | OUTPATIENT
Start: 2023-03-24 | End: 2023-03-24

## 2023-03-24 RX ORDER — CHLORHEXIDINE GLUCONATE 213 G/1000ML
15 SOLUTION TOPICAL EVERY 12 HOURS
Refills: 0 | Status: DISCONTINUED | OUTPATIENT
Start: 2023-03-24 | End: 2023-03-29

## 2023-03-24 RX ORDER — BUMETANIDE 0.25 MG/ML
2 INJECTION INTRAMUSCULAR; INTRAVENOUS ONCE
Refills: 0 | Status: COMPLETED | OUTPATIENT
Start: 2023-03-24 | End: 2023-03-24

## 2023-03-24 RX ORDER — POTASSIUM CHLORIDE 20 MEQ
20 PACKET (EA) ORAL
Refills: 0 | Status: COMPLETED | OUTPATIENT
Start: 2023-03-24 | End: 2023-03-24

## 2023-03-24 RX ORDER — ALBUMIN HUMAN 25 %
50 VIAL (ML) INTRAVENOUS ONCE
Refills: 0 | Status: COMPLETED | OUTPATIENT
Start: 2023-03-24 | End: 2023-03-24

## 2023-03-24 RX ADMIN — MILRINONE LACTATE 5.78 MICROGRAM(S)/KG/MIN: 1 INJECTION, SOLUTION INTRAVENOUS at 05:50

## 2023-03-24 RX ADMIN — LACOSAMIDE 120 MILLIGRAM(S): 50 TABLET ORAL at 19:36

## 2023-03-24 RX ADMIN — PANTOPRAZOLE SODIUM 40 MILLIGRAM(S): 20 TABLET, DELAYED RELEASE ORAL at 12:32

## 2023-03-24 RX ADMIN — Medication 10 MILLIGRAM(S): at 00:36

## 2023-03-24 RX ADMIN — Medication 81 MILLIGRAM(S): at 12:24

## 2023-03-24 RX ADMIN — CHLORHEXIDINE GLUCONATE 1 APPLICATION(S): 213 SOLUTION TOPICAL at 04:56

## 2023-03-24 RX ADMIN — BUMETANIDE 2 MILLIGRAM(S): 0.25 INJECTION INTRAMUSCULAR; INTRAVENOUS at 16:34

## 2023-03-24 RX ADMIN — Medication 57.5 MILLIGRAM(S): at 06:48

## 2023-03-24 RX ADMIN — LACOSAMIDE 120 MILLIGRAM(S): 50 TABLET ORAL at 07:31

## 2023-03-24 RX ADMIN — CHLORHEXIDINE GLUCONATE 1 APPLICATION(S): 213 SOLUTION TOPICAL at 00:34

## 2023-03-24 RX ADMIN — Medication 50 MILLILITER(S): at 13:32

## 2023-03-24 RX ADMIN — Medication 50 MILLILITER(S): at 04:58

## 2023-03-24 RX ADMIN — Medication 80 MILLIGRAM(S): at 13:34

## 2023-03-24 RX ADMIN — Medication 100 MILLIEQUIVALENT(S): at 15:21

## 2023-03-24 RX ADMIN — Medication 10 MILLIGRAM(S): at 12:32

## 2023-03-24 RX ADMIN — CHLORHEXIDINE GLUCONATE 15 MILLILITER(S): 213 SOLUTION TOPICAL at 21:44

## 2023-03-24 RX ADMIN — Medication 20 MILLIGRAM(S): at 14:03

## 2023-03-24 RX ADMIN — CHLORHEXIDINE GLUCONATE 15 MILLILITER(S): 213 SOLUTION TOPICAL at 07:31

## 2023-03-24 RX ADMIN — Medication 57.5 MILLIGRAM(S): at 18:38

## 2023-03-24 RX ADMIN — Medication 100 MILLIEQUIVALENT(S): at 16:22

## 2023-03-24 RX ADMIN — BUMETANIDE 2 MILLIGRAM(S): 0.25 INJECTION INTRAMUSCULAR; INTRAVENOUS at 21:43

## 2023-03-24 NOTE — PROGRESS NOTE ADULT - SUBJECTIVE AND OBJECTIVE BOX
Patient is a 53y Male seen and evaluated at bedside. Remains intubated, sedated though following commands. CVVHD running overnight without complications. CT head done yesterday negative for any acute pathology. Pt remains non-oliguric w/ 660 ml of UO/24 hours      Meds:    albumin human 25% IVPB 50 every 8 hours  aMIOdarone Infusion 1 <Continuous>  aMIOdarone Infusion 0.5 <Continuous>  aspirin  chewable 81 daily  chlorhexidine 0.12% Liquid 15 every 12 hours  chlorhexidine 2% Cloths 1 daily  dextrose 10%. 250 <Continuous>  dextrose 10%. 50 <Continuous>  dextrose 5%. 1000 <Continuous>  dextrose 5%. 1000 <Continuous>  dextrose 50% Injectable 50 every 15 minutes  dextrose 50% Injectable 25 every 15 minutes  dextrose Oral Gel 15 once PRN  glucagon  Injectable 1 once  insulin lispro (ADMELOG) corrective regimen sliding scale  three times a day before meals  insulin lispro (ADMELOG) corrective regimen sliding scale  at bedtime  lacosamide IVPB 100 every 12 hours  metoclopramide Injectable 10 every 12 hours  milrinone Infusion 0.25 <Continuous>  pantoprazole  Injectable 40 daily  phenylephrine    Infusion 0.2 <Continuous>  propofol Infusion 5 <Continuous>  sodium chloride 0.9%. 1000 <Continuous>  valproate sodium  IVPB 750 every 12 hours  vasopressin Infusion 0.03 <Continuous>      T(C): , Max: 37.3 (03-23-23 @ 14:19)  T(F): , Max: 99.2 (03-23-23 @ 14:19)  HR: 109 (03-24-23 @ 09:00)  BP: --  BP(mean): --  RR: 18 (03-24-23 @ 08:00)  SpO2: 100% (03-24-23 @ 09:00)  Wt(kg): --    03-23 @ 07:01  -  03-24 @ 07:00  --------------------------------------------------------  IN: 3546.2 mL / OUT: 4706 mL / NET: -1159.8 mL    03-24 @ 07:01  -  03-24 @ 10:00  --------------------------------------------------------  IN: 117.5 mL / OUT: 244 mL / NET: -126.5 mL          Review of Systems:  ROS negative except as per HPI      PHYSICAL EXAM:  GENERAL: Intubated, sedated  CHEST/LUNG: Occasional rhonchi B/L, on MV  HEART: Regular rate and rhythm, no murmur  ABDOMEN: Soft, nondistended  : Cantrell in place  EXTREMITIES: trace edema all extremities  Neurology: Sedated  ACCESS: Lt fem temp HD cath    LABS:                        9.0    13.05 )-----------( 38       ( 24 Mar 2023 04:18 )             25.9     03-24    131<L>  |  102  |  25<H>  ----------------------------<  78  3.9   |  24  |  1.99<H>    Ca    8.0<L>      24 Mar 2023 04:18  Phos  3.0     03-24  Mg     2.1     03-24    TPro  4.7<L>  /  Alb  2.3<L>  /  TBili  1.4<H>  /  DBili  x   /  AST  161<H>  /  ALT  18  /  AlkPhos  76  03-24      PT/INR - ( 24 Mar 2023 04:18 )   PT: 12.2 sec;   INR: 1.02          PTT - ( 24 Mar 2023 04:18 )  PTT:35.5 sec          RADIOLOGY & ADDITIONAL STUDIES:

## 2023-03-24 NOTE — CONSULT NOTE ADULT - SUBJECTIVE AND OBJECTIVE BOX
**STROKE CONSULT NOTE**    HPI: 52 year old male, current every day marijuana use with a past medical hx of HTN, type A aortic dissection s/p Dacron grafts, AV resuspension in 2013,CAD s/p CABG x 1 SVG to RCA (5/2013 with Dr. Medina), seizure disorder (last episode on 7/4/22) now s/p prolonged replacement of transverse aortic arch, second stage thoracic endovascular aortic repair, aorto-axillary bypass, AV replacement (bio 23mm), and CABG x 1 (SVG-RCA) EF 75% with Dr. Pierre 3/20. Intraop patient required a significant amount of blood products and fluids. C/c/b refractory acidosis requiring CVVHD, hypoxia requiring bronchoscopy, and AFib, currently on an amio gtt, and thrombocytopenia. Epilepsy following for subtherapeutic AEDs, EEG on for a short period time, low suspicion for seizures. Stroke neuro consulted as patient is having difficulty waking up after prolonged surgery. As per RN at bedside, patient's sedation had been held since 1 AM prior to examination.    T(C): 37.8 (03-24-23 @ 14:00), Max: 37.9 (03-24-23 @ 12:00)  HR: 102 (03-24-23 @ 14:00) (95 - 109)  BP: --  RR: 14 (03-24-23 @ 14:00) (14 - 20)  SpO2: 100% (03-24-23 @ 14:00) (100% - 100%)    PAST MEDICAL & SURGICAL HISTORY:  HTN (hypertension)      Aortic dissection      CAD (coronary artery disease)      Seizure disorder      S/P aortic bifurcation bypass graft      S/P CABG x 1          FAMILY HISTORY:  No pertinent family history in first degree relatives        ROS: Unable to obtain 2/2 to patient's current mental status     MEDICATIONS  (STANDING):  aMIOdarone Infusion 1 mG/Min (33.3 mL/Hr) IV Continuous <Continuous>  aMIOdarone Infusion 0.5 mG/Min (16.7 mL/Hr) IV Continuous <Continuous>  aspirin  chewable 81 milliGRAM(s) Enteral Tube daily  chlorhexidine 0.12% Liquid 15 milliLiter(s) Oral Mucosa every 12 hours  chlorhexidine 2% Cloths 1 Application(s) Topical daily  dextrose 10%. 250 milliLiter(s) (500 mL/Hr) IV Continuous <Continuous>  dextrose 10%. 50 milliLiter(s) (10 mL/Hr) IV Continuous <Continuous>  dextrose 5%. 1000 milliLiter(s) (50 mL/Hr) IV Continuous <Continuous>  dextrose 5%. 1000 milliLiter(s) (100 mL/Hr) IV Continuous <Continuous>  dextrose 50% Injectable 50 milliLiter(s) IV Push every 15 minutes  dextrose 50% Injectable 25 milliLiter(s) IV Push every 15 minutes  furosemide   Injectable 20 milliGRAM(s) IV Push once  glucagon  Injectable 1 milliGRAM(s) IntraMuscular once  insulin lispro (ADMELOG) corrective regimen sliding scale   SubCutaneous three times a day before meals  insulin lispro (ADMELOG) corrective regimen sliding scale   SubCutaneous at bedtime  lacosamide IVPB 100 milliGRAM(s) IV Intermittent every 12 hours  metoclopramide Injectable 10 milliGRAM(s) IV Push every 12 hours  milrinone Infusion 0.25 MICROgram(s)/kG/Min (5.78 mL/Hr) IV Continuous <Continuous>  pantoprazole  Injectable 40 milliGRAM(s) IV Push daily  phenylephrine    Infusion 0.2 MICROgram(s)/kG/Min (5.78 mL/Hr) IV Continuous <Continuous>  propofol Infusion 5 MICROgram(s)/kG/Min (2.31 mL/Hr) IV Continuous <Continuous>  sodium chloride 0.9%. 1000 milliLiter(s) (10 mL/Hr) IV Continuous <Continuous>  valproate sodium  IVPB 750 milliGRAM(s) IV Intermittent every 12 hours  vasopressin Infusion 0.03 Unit(s)/Min (4.5 mL/Hr) IV Continuous <Continuous>    MEDICATIONS  (PRN):  dextrose Oral Gel 15 Gram(s) Oral once PRN Blood Glucose LESS THAN 70 milliGRAM(s)/deciliter    Allergies    No Known Allergies    Intolerances      Vital Signs Last 24 Hrs  T(C): 37.8 (24 Mar 2023 14:00), Max: 37.9 (24 Mar 2023 12:00)  T(F): 100 (24 Mar 2023 14:00), Max: 100.2 (24 Mar 2023 12:00)  HR: 102 (24 Mar 2023 14:00) (95 - 109)  BP: --  BP(mean): --  RR: 14 (24 Mar 2023 14:00) (14 - 20)  SpO2: 100% (24 Mar 2023 14:00) (100% - 100%)    Parameters below as of 24 Mar 2023 08:00  Patient On (Oxygen Delivery Method): ventilator    O2 Concentration (%): 50    Physical exam:  Constitutional: Intubated  Eyes: Anicteric sclerae, moist conjunctivae, see below for CNs  Extremities: B/l UE and LEs edematous    Neurologic:  -Mental status: Intubated. Opening voice to noxious however is not following commands. Mute.   -Cranial nerves:   II: Blink to threat intact b/l  III, IV, VI: Able to track provider, crossing midline spontaneously. Pupils equally round and reactive to light  V:  Corneal reflex intact b/l  VII: Face appears symmetric  IX, X: Cough and gag intact  Motor/Sensation: Grimacing to pain in b/l UEs however does not withdrawal. Triple flexing in b/l LEs to noxious   Reflexes: Downgoing toes bilaterally     NIHSS: 19     Fingerstick Blood Glucose: CAPILLARY BLOOD GLUCOSE      POCT Blood Glucose.: 100 mg/dL (24 Mar 2023 12:39)    LABS:                        8.6    11.16 )-----------( 26       ( 24 Mar 2023 12:23 )             24.4     03-24    134<L>  |  104  |  28<H>  ----------------------------<  92  3.6   |  23  |  2.18<H>    Ca    7.9<L>      24 Mar 2023 12:23  Phos  3.3     03-24  Mg     2.2     03-24    TPro  4.7<L>  /  Alb  2.1<L>  /  TBili  1.4<H>  /  DBili  x   /  AST  130<H>  /  ALT  16  /  AlkPhos  76  03-24    PT/INR - ( 24 Mar 2023 12:23 )   PT: 12.3 sec;   INR: 1.03          PTT - ( 24 Mar 2023 12:23 )  PTT:34.9 sec          RADIOLOGY & ADDITIONAL STUDIES:  < from: CT Head No Cont (03.23.23 @ 15:01) >  Impression:    No acute intracranial hemorrhage, mass effect or demarcated territorial   infarction. .    < end of copied text >        **STROKE CONSULT NOTE**    HPI: 52 year old male, current every day marijuana use with a past medical hx of HTN, type A aortic dissection s/p Dacron grafts, AV resuspension in 2013,CAD s/p CABG x 1 SVG to RCA (5/2013 with Dr. Medina), seizure disorder (last episode on 7/4/22) now s/p prolonged replacement of transverse aortic arch, second stage thoracic endovascular aortic repair, aorto-axillary bypass, AV replacement (bio 23mm), and CABG x 1 (SVG-RCA) EF 75% with Dr. Pierre 3/20. Intraop patient required a significant amount of blood products and fluids. C/c/b refractory acidosis requiring CVVHD, hypoxia requiring bronchoscopy, and AFib, currently on an amio gtt, and thrombocytopenia. Epilepsy following for subtherapeutic AEDs, EEG on for a short period time, low suspicion for seizures. Stroke neuro consulted as patient is having difficulty waking up after prolonged surgery. As per RN at bedside, patient's sedation had been held since 1 AM prior to examination.    T(C): 37.8 (03-24-23 @ 14:00), Max: 37.9 (03-24-23 @ 12:00)  HR: 102 (03-24-23 @ 14:00) (95 - 109)  BP: --  RR: 14 (03-24-23 @ 14:00) (14 - 20)  SpO2: 100% (03-24-23 @ 14:00) (100% - 100%)    PAST MEDICAL & SURGICAL HISTORY:  HTN (hypertension)      Aortic dissection      CAD (coronary artery disease)      Seizure disorder      S/P aortic bifurcation bypass graft      S/P CABG x 1          FAMILY HISTORY:  No pertinent family history in first degree relatives        ROS: Unable to obtain 2/2 to patient's current mental status     MEDICATIONS  (STANDING):  aMIOdarone Infusion 1 mG/Min (33.3 mL/Hr) IV Continuous <Continuous>  aMIOdarone Infusion 0.5 mG/Min (16.7 mL/Hr) IV Continuous <Continuous>  aspirin  chewable 81 milliGRAM(s) Enteral Tube daily  chlorhexidine 0.12% Liquid 15 milliLiter(s) Oral Mucosa every 12 hours  chlorhexidine 2% Cloths 1 Application(s) Topical daily  dextrose 10%. 250 milliLiter(s) (500 mL/Hr) IV Continuous <Continuous>  dextrose 10%. 50 milliLiter(s) (10 mL/Hr) IV Continuous <Continuous>  dextrose 5%. 1000 milliLiter(s) (50 mL/Hr) IV Continuous <Continuous>  dextrose 5%. 1000 milliLiter(s) (100 mL/Hr) IV Continuous <Continuous>  dextrose 50% Injectable 50 milliLiter(s) IV Push every 15 minutes  dextrose 50% Injectable 25 milliLiter(s) IV Push every 15 minutes  furosemide   Injectable 20 milliGRAM(s) IV Push once  glucagon  Injectable 1 milliGRAM(s) IntraMuscular once  insulin lispro (ADMELOG) corrective regimen sliding scale   SubCutaneous three times a day before meals  insulin lispro (ADMELOG) corrective regimen sliding scale   SubCutaneous at bedtime  lacosamide IVPB 100 milliGRAM(s) IV Intermittent every 12 hours  metoclopramide Injectable 10 milliGRAM(s) IV Push every 12 hours  milrinone Infusion 0.25 MICROgram(s)/kG/Min (5.78 mL/Hr) IV Continuous <Continuous>  pantoprazole  Injectable 40 milliGRAM(s) IV Push daily  phenylephrine    Infusion 0.2 MICROgram(s)/kG/Min (5.78 mL/Hr) IV Continuous <Continuous>  propofol Infusion 5 MICROgram(s)/kG/Min (2.31 mL/Hr) IV Continuous <Continuous>  sodium chloride 0.9%. 1000 milliLiter(s) (10 mL/Hr) IV Continuous <Continuous>  valproate sodium  IVPB 750 milliGRAM(s) IV Intermittent every 12 hours  vasopressin Infusion 0.03 Unit(s)/Min (4.5 mL/Hr) IV Continuous <Continuous>    MEDICATIONS  (PRN):  dextrose Oral Gel 15 Gram(s) Oral once PRN Blood Glucose LESS THAN 70 milliGRAM(s)/deciliter    Allergies    No Known Allergies    Intolerances      Vital Signs Last 24 Hrs  T(C): 37.8 (24 Mar 2023 14:00), Max: 37.9 (24 Mar 2023 12:00)  T(F): 100 (24 Mar 2023 14:00), Max: 100.2 (24 Mar 2023 12:00)  HR: 102 (24 Mar 2023 14:00) (95 - 109)  BP: --  BP(mean): --  RR: 14 (24 Mar 2023 14:00) (14 - 20)  SpO2: 100% (24 Mar 2023 14:00) (100% - 100%)    Parameters below as of 24 Mar 2023 08:00  Patient On (Oxygen Delivery Method): ventilator    O2 Concentration (%): 50    Physical exam:  Constitutional: Intubated  Eyes: Anicteric sclerae, moist conjunctivae, see below for CNs  Extremities: B/l UE and LEs edematous    Neurologic:  -Mental status: Intubated. Opening eyes to noxious however is not following commands. Mute.   -Cranial nerves:   II: Blink to threat intact b/l  III, IV, VI: Able to track provider, crossing midline spontaneously. Pupils equally round and reactive to light  V:  Corneal reflex intact b/l  VII: Face appears symmetric  IX, X: Cough and gag intact  Motor/Sensation: Grimacing to pain in b/l UEs however does not withdrawal. Triple flexing in b/l LEs to noxious   Reflexes: Downgoing toes bilaterally     NIHSS: 19     Fingerstick Blood Glucose: CAPILLARY BLOOD GLUCOSE      POCT Blood Glucose.: 100 mg/dL (24 Mar 2023 12:39)    LABS:                        8.6    11.16 )-----------( 26       ( 24 Mar 2023 12:23 )             24.4     03-24    134<L>  |  104  |  28<H>  ----------------------------<  92  3.6   |  23  |  2.18<H>    Ca    7.9<L>      24 Mar 2023 12:23  Phos  3.3     03-24  Mg     2.2     03-24    TPro  4.7<L>  /  Alb  2.1<L>  /  TBili  1.4<H>  /  DBili  x   /  AST  130<H>  /  ALT  16  /  AlkPhos  76  03-24    PT/INR - ( 24 Mar 2023 12:23 )   PT: 12.3 sec;   INR: 1.03          PTT - ( 24 Mar 2023 12:23 )  PTT:34.9 sec          RADIOLOGY & ADDITIONAL STUDIES:  < from: CT Head No Cont (03.23.23 @ 15:01) >  Impression:    No acute intracranial hemorrhage, mass effect or demarcated territorial   infarction. .    < end of copied text >

## 2023-03-24 NOTE — CHART NOTE - NSCHARTNOTEFT_GEN_A_CORE
Left arterial small sheath catheter removed.  Manual pressure held x 20 minutes.  Good hemostasis.  No hematoma seen or felt.  Radial art line remains.

## 2023-03-24 NOTE — PROGRESS NOTE ADULT - SUBJECTIVE AND OBJECTIVE BOX
CTICU  CRITICAL  CARE  attending     Hand off received 					   Pertinent clinical, laboratory, radiographic, hemodynamic, echocardiographic, respiratory data, microbiologic data and chart were reviewed and analyzed frequently throughout the course of the day  Patient seen and examined with CTS/ SH attending at bedside  Pt is a  53yr old male with PMH HTN, type A dissection sp Dacron grafts and AV resuspension (2013), CAD sp CABG x 1 ([facility], 5/2013, SVG-RCA), seizures, admitted for surgical mgmt of progression of aneurysmal disease. Patient underwent replacement of transverse aortic arch, second stage thoracic endovascular aortic repair, aorto-axillary bypass, AV replacement (bio 23mm), and CABG x 1 (SVG-RCA) EF 75% with Dr. Pierre 3/20. Patient received 7 units pRBC, 6 FFP, 4 plt, 15 cryo, 1000 FEIBA, and uo 1300cc. Started on lasix infusion and phenylephrine, bicarb, Armaan, levo and vaso, with lactic acidosis. 3/21 L femoral HD cath placed and CVVHD started with improvement in acidosis. Pressors off by end of day. Primacor weaned overnight. Patient woke up and follows commands, exhibited facial twitching cw prior seizures per wife and ativan given followed by vEEG placement. Neurology consulted for recs given subtherapeutic AED levels. Given 2 units pRBC. 3/22 Neurology recommended dc vEEG given low suspicion for seizures. Fluid removal initiated with CVVHD. Overnight poor oxygenation requiring bronchoscopy, on laquita and vaso, D10 started for hypoglycemia. 3/23 large residuals, reglan started. CTH performed for poor mentation-no bleed. Amio given for afib. Received 1 unit pRBC and 1 plt, femoral a line removed. CVVHD stopped and diuretic initiated with good response. Primacor turned off.       FAMILY HISTORY:  No pertinent family history in first degree relatives  PAST MEDICAL & SURGICAL HISTORY:  HTN (hypertension)  Aortic dissection  CAD (coronary artery disease)  Seizure disorder  S/P aortic bifurcation bypass graft  S/P CABG x 1        Patient is a 53y old  Male who presents with a chief complaint of expanding aortic arch and descending aortic aneurysm.      14 system review was unremarkable    Vital signs, hemodynamic and respiratory parameters were reviewed from the bedside nursing flowsheet.  ICU Vital Signs Last 24 Hrs  T(C): 36.1 (24 Mar 2023 17:42), Max: 37.9 (24 Mar 2023 12:00)  T(F): 96.9 (24 Mar 2023 17:42), Max: 100.2 (24 Mar 2023 12:00)  HR: 87 (24 Mar 2023 20:00) (87 - 109)  BP: --  BP(mean): --  ABP: 132/62 (24 Mar 2023 20:00) (78/53 - 159/60)  ABP(mean): 79 (24 Mar 2023 20:00) (64 - 92)  RR: 15 (24 Mar 2023 20:00) (14 - 25)  SpO2: 100% (24 Mar 2023 20:00) (100% - 100%)    O2 Parameters below as of 24 Mar 2023 15:00  Patient On (Oxygen Delivery Method): ventilator          Adult Advanced Hemodynamics Last 24 Hrs  CVP(mm Hg): 10 (24 Mar 2023 20:00) (-17 - 150)  CVP(cm H2O): --  CO: 7.2 (24 Mar 2023 19:00) (5.3 - 7.7)  CI: 3.6 (24 Mar 2023 19:00) (2.6 - 3.9)  PA: 24/12 (24 Mar 2023 20:00) (7/7 - 29/18)  PA(mean): 19 (24 Mar 2023 20:00) (7 - 24)  PCWP: --  SVR: 1098 (24 Mar 2023 19:00) (688 - 1098)  SVRI: 2197 (24 Mar 2023 19:00) (1376 - 2197)  PVR: --  PVRI: --, ABG - ( 24 Mar 2023 16:46 )  pH, Arterial: 7.45  pH, Blood: x     /  pCO2: 32    /  pO2: 153   / HCO3: 22    / Base Excess: -1.0  /  SaO2: 99.2              Mode: AC/ CMV (Assist Control/ Continuous Mandatory Ventilation)  RR (machine): 14  TV (machine): 650  FiO2: 50  PEEP: 8  ITime: 1  MAP: 13  PIP: 26    Intake and output was reviewed and the fluid balance was calculated  Daily     Daily   I&O's Summary    23 Mar 2023 07:01  -  24 Mar 2023 07:00  --------------------------------------------------------  IN: 3546.2 mL / OUT: 4706 mL / NET: -1159.8 mL    24 Mar 2023 07:01  -  24 Mar 2023 20:44  --------------------------------------------------------  IN: 1068.8 mL / OUT: 2181 mL / NET: -1112.2 mL        All lines and drain sites were assessed  Glycemic trend was reviewed  POCT Blood Glucose.: 102 mg/dL (24 Mar 2023 17:01)    Neuro: sedated  HEENT: mmm  Heart: s1 s2   Lungs: decreased bl  Abdomen: soft nt nd  Extremities: 2+dp    Lines:  RIJ Cordis with Borden 3/20  R radial arterial line 3/20  L femoral HD catheter 3/21    Tubes:  Med bella x 3  Pleural bella x 2    labs  CBC Full  -  ( 24 Mar 2023 16:46 )  WBC Count : 10.34 K/uL  RBC Count : 2.61 M/uL  Hemoglobin : 8.1 g/dL  Hematocrit : 23.4 %  Platelet Count - Automated : 73 K/uL  Mean Cell Volume : 89.7 fl  Mean Cell Hemoglobin : 31.0 pg  Mean Cell Hemoglobin Concentration : 34.6 gm/dL  Auto Neutrophil # : x  Auto Lymphocyte # : x  Auto Monocyte # : x  Auto Eosinophil # : x  Auto Basophil # : x  Auto Neutrophil % : x  Auto Lymphocyte % : x  Auto Monocyte % : x  Auto Eosinophil % : x  Auto Basophil % : x    03-24    137  |  104  |  31<H>  ----------------------------<  108<H>  3.8   |  23  |  2.41<H>    Ca    7.9<L>      24 Mar 2023 16:46  Phos  3.5     03-24  Mg     2.4     03-24    TPro  4.7<L>  /  Alb  2.5<L>  /  TBili  1.4<H>  /  DBili  x   /  AST  111<H>  /  ALT  14  /  AlkPhos  74  03-24    PT/INR - ( 24 Mar 2023 16:46 )   PT: 12.1 sec;   INR: 1.02          PTT - ( 24 Mar 2023 16:46 )  PTT:33.7 sec  The current medications were reviewed   MEDICATIONS  (STANDING):  aMIOdarone Infusion 0.5 mG/Min (16.7 mL/Hr) IV Continuous <Continuous>  aspirin  chewable 81 milliGRAM(s) Enteral Tube daily  buMETAnide Injectable 2 milliGRAM(s) IV Push every 8 hours  chlorhexidine 0.12% Liquid 15 milliLiter(s) Oral Mucosa every 12 hours  chlorhexidine 2% Cloths 1 Application(s) Topical daily  dextrose 10%. 250 milliLiter(s) (500 mL/Hr) IV Continuous <Continuous>  dextrose 10%. 50 milliLiter(s) (10 mL/Hr) IV Continuous <Continuous>  dextrose 5%. 1000 milliLiter(s) (50 mL/Hr) IV Continuous <Continuous>  dextrose 5%. 1000 milliLiter(s) (100 mL/Hr) IV Continuous <Continuous>  dextrose 50% Injectable 50 milliLiter(s) IV Push every 15 minutes  dextrose 50% Injectable 25 milliLiter(s) IV Push every 15 minutes  glucagon  Injectable 1 milliGRAM(s) IntraMuscular once  insulin lispro (ADMELOG) corrective regimen sliding scale   SubCutaneous every 6 hours  lacosamide IVPB 100 milliGRAM(s) IV Intermittent every 12 hours  metoclopramide Injectable 10 milliGRAM(s) IV Push every 12 hours  pantoprazole  Injectable 40 milliGRAM(s) IV Push daily  phenylephrine    Infusion 0.2 MICROgram(s)/kG/Min (5.78 mL/Hr) IV Continuous <Continuous>  sodium chloride 0.9%. 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     POD4 replacement of transverse aortic arch, second stage thoracic endovascular aortic repair, aorto-axillary bypass, AV replacement (bio 23mm), and CABG x 1 (SVG-RCA) (EF 75%, [place], 3/20)  RASS goal 0/-1, wean sedation to assess neuro status  Acute respiratory failure requiring mechanical ventilation-keep  Cardiogenic shock requiring primacor-now off  Serial Ci/CO, trend end organ perfusion markers  Continue diuresis  on Armaan-wean   Continue AEDs  Acute post operative anemia-trend H/H, 1 unit prbc now  Thrombocytopenia-monitor, sp 1 unit plt 3/22  Mild LFT elevation-trend  Trend CK  TREY now  Replete lytes prn  Monitor CT output  Tube feeds, increase to goal as tolerated  GI/DVT PPX  Bowel Regimen  Pain control  Close hemodynamic, ventilatory and drain monitoring and management per post op routine  Monitor for arrhythmias and monitor parameters for organ perfusion  Beta blockade not administered due to hemodynamic instability and bradycardia  Monitor neurologic status  Head of the bed should remain elevated to 45 deg   Chest PT and IS will be encouraged  Monitor adequacy of oxygenation and ventilation and attempt to wean oxygen  Antibiotic regimen will be tailored to the clinical, laboratory and microbiologic data  Nutritional goals will be met using po eventually, ensure adequate caloric intake and monitor the same  Stress ulcer and VTE prophylaxis will be achieved    Glycemic control is satisfactory  Electrolytes have been repleted as necessary and wound care has been carried out   Pain control has been achieved.   Aggressive physical therapy and early mobility and ambulation goals will be met   The family was updated about the course and plan.    CRITICAL CARE TIME personally provided by me  in evaluation and management, reassessments, review and interpretation of labs and x-rays, ventilator and hemodynamic management, formulating a plan and coordinating care: ___30____ MIN.  Time does not include procedural time.    CTICU ATTENDING     					  Alexx Brooks MD

## 2023-03-24 NOTE — CONSULT NOTE ADULT - ASSESSMENT
52 year old male, current every day marijuana use with a past medical hx of HTN, type A aortic dissection s/p Dacron grafts, AV resuspension in 2013,CAD s/p CABG x 1 SVG to RCA (5/2013 with Dr. Medina), seizure disorder (last episode on 7/4/22) now s/p prolonged replacement of transverse aortic arch, second stage thoracic endovascular aortic repair, aorto-axillary bypass, AV replacement (bio 23mm), and CABG x 1 (SVG-RCA) EF 75% with Dr. Pierre 3/20. C/c/b refractory acidosis requiring CVVHD, hypoxia requiring bronchoscopy, and AFib, currently on an amio gtt, and thrombocytopenia. Stroke consulted as patient is having difficulty waking up from prolonged surgery. CTH negative for acute hemorrhage or infarct.     Plan:   - Will continue to follow to see if patient's exam improves and mentation progresses  - Continue ASA 81mg PO  - Rest of care per primary team    Discussed with Stroke Fellow, Dr. Rice, and Stroke Attending, Dr. Colin

## 2023-03-24 NOTE — CONSULT NOTE ADULT - NS ATTEND AMEND GEN_ALL_CORE FT
The patient is a 52 yom with multiple comorbidities including seizure disorder admitted 3/20 s/p total arch replacement, TEVR 3/20 with course complicated by renal failure requiring CVVHD, a fib, and hypotension requiring pressor support. Patient has been on prolonged sedation. When sedation was held, patient was slow to awaken- stroke consulted. HCT yesterday without acute findings.  CTA deferred given TREY and unlikelihood of changing acute management. Current exam is limited but patient is gradually more easily alerted to voice and tracks more consistently. Not yet following commands. Minimal motor response but sensation intact. Rec continuing to hold sedation and follow neuro exam closely. AED per epilepsy. No further stroke eval necessary at this time.
Patient seen and examined by me on 3/22/2023.    At the time of my exam he was on 20 mcg/kg/min of propofol.  He did not respond to voice or noxious stimulation but pupils were equal and reactive and he was able to blink to threat.  No facial twitching was noted.  EEG thus far shows only background slowing, likely related to propofol, without focal or epileptiform findings.  While the facial twitching yesterday may have been a focal motor seizure, none has been observed since EEG was connected and focal motor seizures may not show up on EEG.  Has been loaded with Depakote and is now back on his usual home dose in additional to his home dose of Vimpat, so whether or not these events were seizures, his risk of additional seizures is likely relatively low.  Can stop EEG.  Continue Depakote and Vimpat at current doses.  Epilepsy will sign off.  Please call with any questions.

## 2023-03-24 NOTE — PROGRESS NOTE ADULT - ASSESSMENT
52 year old male, current every day marijuana use w/pmh of HTN, type A aortic dissection s/p Dacron grafts, AV resuspension in 2013,CAD s/p CABG x 1 SVG to RCA (5/2013 with Dr. Medina), seizure disorder (last episode on 7/4/22) presents for a follow up visit for evaluation and management of his aortic pathology and deemed a candidate for a reop total arch replacement given aortic aneurysm and chronic dissection.  S/p AVR/ Arch replacement/ aorto-axillary bypass- CABG x 1 and TEVAR   3/20  Postop course with worsening renal failure and sig acidosis started on CVVHD  Now with decreasing pressors and inotrops requirement and improving acidosis   Afib 3/23  A/p   - awaiting SAT/ off sedation tracking more--> continue neuro check per protocol- avoid sedation and narcotics   facial twitching stopped, vEEG neg for active seizures activity continue home depakote and vimpat per epilepsy recs   - afib rate controlled--> continue low dose IV amio and wean arrhythmogenic agents as tolerated   - Good cardiac indices/ mixed venous sats--. weaning down Mil to off today follow perfusion parameters closely   - PFR improving continue to wean Fio2/PEE and Armaan follow serial blood gases   right pleural effusion almost completely improved, blakes output decreasing   - Trickle feeds with no residue- Advance per protocol   - Cont D10 infusion as advancing diet to prevent hypoglycemia   - Renal failure most likely iATN given long operation/ required CVVHD for acidosis though non oliguric renal injury--> renal onboard plan to transition CVVHD to diuretic challenge to maintain renal flow if good response will diurese to Neg FB given sig anasarca   - H&H drifting if poor diuretic response will transfuse 1 U of blood   PLT down to 26/ No active bleeding noted/ HIT neg x2 will monitor and consider transfusion for PLT < 20    Central line and HD cath day 4--> temp curve slowly rising will consider to exchange central line if temp rises more and leave HD cath one more day   would need plt/ DDAVT prior to exchange lines   ATTENDING: I have personally and independently provided 90 Min of critical care services. this excludes time spent on sperate procedures or teaching. 52 year old male, current every day marijuana use w/pmh of HTN, type A aortic dissection s/p Dacron grafts, AV resuspension in 2013,CAD s/p CABG x 1 SVG to RCA (5/2013 with Dr. Medina), seizure disorder (last episode on 7/4/22) presents for a follow up visit for evaluation and management of his aortic pathology and deemed a candidate for a reop total arch replacement given aortic aneurysm and chronic dissection.  S/p AVR/ Arch replacement/ aorto-axillary bypass- CABG x 1 and TEVAR   3/20  Postop course with worsening renal failure and sig acidosis started on CVVHD  Now with decreasing pressors and inotrops requirement and improving acidosis   Afib 3/23  A/p   - awaiting SAT/ off sedation tracking more--> continue neuro check per protocol- avoid sedation and narcotics   facial twitching stopped, vEEG neg for active seizures activity continue home depakote and vimpat per epilepsy recs   - afib rate controlled--> continue low dose IV amio and wean arrhythmogenic agents as tolerated   - Good cardiac indices/ mixed venous sats--. weaning down Mil to off today follow perfusion parameters closely   - PFR improving continue to wean Fio2/PEE and Armaan follow serial blood gases   right pleural effusion almost completely improved, blakes output decreasing   - Trickle feeds with no residue- Advance per protocol   - Cont D10 infusion as advancing diet to prevent hypoglycemia   - Renal failure most likely iATN given long operation/ required CVVHD for acidosis though non oliguric renal injury--> renal onboard plan to transition CVVHD to diuretic challenge to maintain renal flow if good response will diurese to Neg FB given sig anasarca   - H&H drifting if poor diuretic response will transfuse 1 U of blood   PLT down to 26/ No active bleeding noted/ HIT neg x2 will monitor and consider transfusion for PLT < 20    will titrate off Mil and check cardiac indices if remain in good range will dc swan today   Central line and HD cath day 4--> temp curve slowly rising will consider to exchange central line if keeps rising and leave HD cath and fem kalpana one more day   would need plt/ DDAVT prior to exchange lines   ATTENDING: I have personally and independently provided 90 Min of critical care services. this excludes time spent on sperate procedures or teaching.

## 2023-03-24 NOTE — CONSULT NOTE ADULT - NSCONSULTADDITIONALINFOA_GEN_ALL_CORE
Vascular Neurology Fellow Attestation:  Patient seen and examined with stroke team and agree with above. 51yo man with persistent lethargy and limited responsiveness following prolonged endovascular aortic repair surgery. CTH negative for acute abnormality. Will monitor off sedation for continued improvement. Further stroke workup deferred in the meantime       Discussed with attending, Dr. Colin

## 2023-03-24 NOTE — CONSULT NOTE ADULT - REASON FOR ADMISSION
expanding aortic arch and descending aortic aneurysm

## 2023-03-24 NOTE — PROGRESS NOTE ADULT - ASSESSMENT
54 yo M w/ HTN, seizure disorder and no underlying CKD who is s/p scheduled replacement of aortic arch and TEVAR on 3/20. Prolonger surgery with significant amount of blood products and fluids recieved intra-op. Post-op pt intubated, on pressors, with severe acidosis. Started on CVVHD 3/21 for refractory acidosis, and was then continued for vol removal, tolerating CVVHD without any complications. Pressor and oxygen requirement decreasing, and remains non-oliguric 3/24      #Seen on CVVHD    #Non-oliguric iATN due to intra-op hemodynamic changes (pt requiring 7 units pRBC intra-op and post-op pt in shock. Shock now improved)  BCr 1.2, eGFR 67 (3/9)  UA w/ trace proteinuria and large blood w/o RBCs. [id number] (3/21)  Net negative 1.1L (3/23-3/24) which was slightly under our goal of net negative 2L/24 hours  Given pt becoming more hemodynamically stable, will hold CVVHD and start diuresis. Give 100mg IV lasix x 1 and will monitor response. Will monitor UO and rate of rise of BUN and Cr, will decide on the timing of next HD. Will suggest leaving the HD cath in for now as do anticipate, pt may need intermittent HD sessions.   Goal is to generate UO. Prefer net negative fluid balance  Met acidosis resolved. Lactate normal now  Strict I&Os  BMP per icu protocol      Plan discussed with CTICU team

## 2023-03-24 NOTE — PROGRESS NOTE ADULT - SUBJECTIVE AND OBJECTIVE BOX
INTERVAL COURSE  POD # 4   AVR/total arch/ TEVAR/ aorto-axillary bypass and CABG x 1   SAT slow--> head CT unremarkable off sedation/ remained nonfocal   CVVHD continued    ICU Vital Signs Last 24 Hrs  T(C): 37.8 (24 Mar 2023 14:00), Max: 37.9 (24 Mar 2023 12:00)  T(F): 100 (24 Mar 2023 14:00), Max: 100.2 (24 Mar 2023 12:00)  HR: 102 (24 Mar 2023 14:00) (95 - 109)  ABP: 107/53 (24 Mar 2023 14:00) (85/53 - 159/60)  ABP(mean): 71 (24 Mar 2023 14:00) (64 - 87)  RR: 14 (24 Mar 2023 14:00) (14 - 20)  SpO2: 100% (24 Mar 2023 14:00) (100% - 100%)    O2 Parameters below as of 24 Mar 2023 08:00  Patient On (Oxygen Delivery Method): ventilator    O2 Concentration (%): 50    Adult Advanced Hemodynamics Last 24 Hrs  CVP(mm Hg): 9 (24 Mar 2023 14:00) (8 - 35)  CO: 7.2 (24 Mar 2023 14:00) (5.3 - 7.7)  CI: 3.6 (24 Mar 2023 14:00) (2.6 - 3.9)  PA: 24/13 (24 Mar 2023 14:00) (19/11 - 29/15)  PA(mean): 19 (24 Mar 2023 14:00) (15 - 21)  SVR: 688 (24 Mar 2023 14:00) (688 - 994)  SVRI: 1376 (24 Mar 2023 14:00) (1376 - 2028)  ABG - ( 24 Mar 2023 12:23 )  pH, Arterial: 7.45  pH, Blood: x     /  pCO2: 32    /  pO2: 120   / HCO3: 22    / Base Excess: -1.0  /  SaO2: 99.8      Mode: AC/ CMV (Assist Control/ Continuous Mandatory Ventilation)  RR (machine): 14  TV (machine): 650  FiO2: 50  PEEP: 8  ITime: 1  MAP: 13  PIP: 26    I&O's Summary    24 Mar 2023 07:01  -  24 Mar 2023 14:25  --------------------------------------------------------  IN: 588.4 mL / OUT: 981 mL / NET: -392.6 mL    PHYSICAL EXAM  General: arousable, opens eyes to command , tracking more   Respiratory: CTA B/L; no wheezes  Cardiovascular: Atrial fib on the tele in 100s  Gastrointestinal: Soft; NTND   Extremities: cool, dopplereble pulses b/l- anasarcic   Neurological: GODINEZ spontaneously    MEDICATIONS  (STANDING):  aMIOdarone Infusion 1 mG/Min (33.3 mL/Hr) IV Continuous <Continuous>  aMIOdarone Infusion 0.5 mG/Min (16.7 mL/Hr) IV Continuous <Continuous>  aspirin  chewable 81 milliGRAM(s) Enteral Tube daily  chlorhexidine 0.12% Liquid 15 milliLiter(s) Oral Mucosa every 12 hours  chlorhexidine 2% Cloths 1 Application(s) Topical daily  dextrose 10%. 250 milliLiter(s) (500 mL/Hr) IV Continuous <Continuous>  dextrose 10%. 50 milliLiter(s) (10 mL/Hr) IV Continuous <Continuous>  dextrose 5%. 1000 milliLiter(s) (50 mL/Hr) IV Continuous <Continuous>  dextrose 5%. 1000 milliLiter(s) (100 mL/Hr) IV Continuous <Continuous>  dextrose 50% Injectable 50 milliLiter(s) IV Push every 15 minutes  dextrose 50% Injectable 25 milliLiter(s) IV Push every 15 minutes  furosemide   Injectable 20 milliGRAM(s) IV Push once  glucagon  Injectable 1 milliGRAM(s) IntraMuscular once  insulin lispro (ADMELOG) corrective regimen sliding scale   SubCutaneous three times a day before meals  insulin lispro (ADMELOG) corrective regimen sliding scale   SubCutaneous at bedtime  lacosamide IVPB 100 milliGRAM(s) IV Intermittent every 12 hours  metoclopramide Injectable 10 milliGRAM(s) IV Push every 12 hours  milrinone Infusion 0.25 MICROgram(s)/kG/Min (5.78 mL/Hr) IV Continuous <Continuous>  pantoprazole  Injectable 40 milliGRAM(s) IV Push daily  phenylephrine    Infusion 0.2 MICROgram(s)/kG/Min (5.78 mL/Hr) IV Continuous <Continuous>  potassium chloride  20 mEq/100 mL IVPB 20 milliEquivalent(s) IV Intermittent every 1 hour  propofol Infusion 5 MICROgram(s)/kG/Min (2.31 mL/Hr) IV Continuous <Continuous>  sodium chloride 0.9%. 1000 milliLiter(s) (10 mL/Hr) IV Continuous <Continuous>  valproate sodium  IVPB 750 milliGRAM(s) IV Intermittent every 12 hours  vasopressin Infusion 0.03 Unit(s)/Min (4.5 mL/Hr) IV Continuous <Continuous>    MEDICATIONS  (PRN):  dextrose Oral Gel 15 Gram(s) Oral once PRN Blood Glucose LESS THAN 70 milliGRAM(s)/deciliter      LABS                        8.6    11.16 )-----------( 26       ( 24 Mar 2023 12:23 )             24.4     03-24    134<L>  |  104  |  28<H>  ----------------------------<  92  3.6   |  23  |  2.18<H>    Ca    7.9<L>      24 Mar 2023 12:23  Phos  3.3     03-24  Mg     2.2     03-24    TPro  4.7<L>  /  Alb  2.1<L>  /  TBili  1.4<H>  /  DBili  x   /  AST  130<H>  /  ALT  16  /  AlkPhos  76  03-24    LIVER FUNCTIONS - ( 24 Mar 2023 12:23 )  Alb: 2.1 g/dL / Pro: 4.7 g/dL / ALK PHOS: 76 U/L / ALT: 16 U/L / AST: 130 U/L / GGT: x           PT/INR - ( 24 Mar 2023 12:23 )   PT: 12.3 sec;   INR: 1.03          PTT - ( 24 Mar 2023 12:23 )  PTT:34.9 sec      IMAGING/EKG/ETC  Reviewed.

## 2023-03-25 LAB
ALBUMIN SERPL ELPH-MCNC: 2 G/DL — LOW (ref 3.3–5)
ALBUMIN SERPL ELPH-MCNC: 2.3 G/DL — LOW (ref 3.3–5)
ALBUMIN SERPL ELPH-MCNC: 2.4 G/DL — LOW (ref 3.3–5)
ALP SERPL-CCNC: 80 U/L — SIGNIFICANT CHANGE UP (ref 40–120)
ALP SERPL-CCNC: 84 U/L — SIGNIFICANT CHANGE UP (ref 40–120)
ALP SERPL-CCNC: 95 U/L — SIGNIFICANT CHANGE UP (ref 40–120)
ALT FLD-CCNC: 10 U/L — SIGNIFICANT CHANGE UP (ref 10–45)
ALT FLD-CCNC: 13 U/L — SIGNIFICANT CHANGE UP (ref 10–45)
ALT FLD-CCNC: 13 U/L — SIGNIFICANT CHANGE UP (ref 10–45)
ANION GAP SERPL CALC-SCNC: 7 MMOL/L — SIGNIFICANT CHANGE UP (ref 5–17)
ANION GAP SERPL CALC-SCNC: 8 MMOL/L — SIGNIFICANT CHANGE UP (ref 5–17)
ANION GAP SERPL CALC-SCNC: 9 MMOL/L — SIGNIFICANT CHANGE UP (ref 5–17)
APTT BLD: 31.8 SEC — SIGNIFICANT CHANGE UP (ref 27.5–35.5)
APTT BLD: 33.7 SEC — SIGNIFICANT CHANGE UP (ref 27.5–35.5)
APTT BLD: 33.8 SEC — SIGNIFICANT CHANGE UP (ref 27.5–35.5)
AST SERPL-CCNC: 49 U/L — HIGH (ref 10–40)
AST SERPL-CCNC: 73 U/L — HIGH (ref 10–40)
AST SERPL-CCNC: 86 U/L — HIGH (ref 10–40)
BASE EXCESS BLDA CALC-SCNC: -0.3 MMOL/L — SIGNIFICANT CHANGE UP (ref -2–3)
BASE EXCESS BLDV CALC-SCNC: -0.4 MMOL/L — SIGNIFICANT CHANGE UP (ref -2–3)
BASE EXCESS BLDV CALC-SCNC: -1.4 MMOL/L — SIGNIFICANT CHANGE UP (ref -2–3)
BASE EXCESS BLDV CALC-SCNC: 0.1 MMOL/L — SIGNIFICANT CHANGE UP (ref -2–3)
BILIRUB SERPL-MCNC: 0.8 MG/DL — SIGNIFICANT CHANGE UP (ref 0.2–1.2)
BILIRUB SERPL-MCNC: 1 MG/DL — SIGNIFICANT CHANGE UP (ref 0.2–1.2)
BILIRUB SERPL-MCNC: 1.2 MG/DL — SIGNIFICANT CHANGE UP (ref 0.2–1.2)
BUN SERPL-MCNC: 39 MG/DL — HIGH (ref 7–23)
BUN SERPL-MCNC: 48 MG/DL — HIGH (ref 7–23)
BUN SERPL-MCNC: 58 MG/DL — HIGH (ref 7–23)
CALCIUM SERPL-MCNC: 8 MG/DL — LOW (ref 8.4–10.5)
CALCIUM SERPL-MCNC: 8.3 MG/DL — LOW (ref 8.4–10.5)
CALCIUM SERPL-MCNC: 8.5 MG/DL — SIGNIFICANT CHANGE UP (ref 8.4–10.5)
CHLORIDE SERPL-SCNC: 103 MMOL/L — SIGNIFICANT CHANGE UP (ref 96–108)
CHLORIDE SERPL-SCNC: 103 MMOL/L — SIGNIFICANT CHANGE UP (ref 96–108)
CHLORIDE SERPL-SCNC: 104 MMOL/L — SIGNIFICANT CHANGE UP (ref 96–108)
CO2 BLDA-SCNC: 23 MMOL/L — SIGNIFICANT CHANGE UP (ref 19–24)
CO2 BLDV-SCNC: 23.9 MMOL/L — SIGNIFICANT CHANGE UP (ref 22–26)
CO2 BLDV-SCNC: 23.9 MMOL/L — SIGNIFICANT CHANGE UP (ref 22–26)
CO2 BLDV-SCNC: 25.9 MMOL/L — SIGNIFICANT CHANGE UP (ref 22–26)
CO2 SERPL-SCNC: 22 MMOL/L — SIGNIFICANT CHANGE UP (ref 22–31)
CO2 SERPL-SCNC: 23 MMOL/L — SIGNIFICANT CHANGE UP (ref 22–31)
CO2 SERPL-SCNC: 23 MMOL/L — SIGNIFICANT CHANGE UP (ref 22–31)
CREAT SERPL-MCNC: 2.87 MG/DL — HIGH (ref 0.5–1.3)
CREAT SERPL-MCNC: 3.23 MG/DL — HIGH (ref 0.5–1.3)
CREAT SERPL-MCNC: 3.43 MG/DL — HIGH (ref 0.5–1.3)
EGFR: 20 ML/MIN/1.73M2 — LOW
EGFR: 22 ML/MIN/1.73M2 — LOW
EGFR: 25 ML/MIN/1.73M2 — LOW
GAS PNL BLDA: SIGNIFICANT CHANGE UP
GAS PNL BLDV: SIGNIFICANT CHANGE UP
GLUCOSE BLDC GLUCOMTR-MCNC: 89 MG/DL — SIGNIFICANT CHANGE UP (ref 70–99)
GLUCOSE BLDC GLUCOMTR-MCNC: 91 MG/DL — SIGNIFICANT CHANGE UP (ref 70–99)
GLUCOSE BLDC GLUCOMTR-MCNC: 95 MG/DL — SIGNIFICANT CHANGE UP (ref 70–99)
GLUCOSE SERPL-MCNC: 102 MG/DL — HIGH (ref 70–99)
GLUCOSE SERPL-MCNC: 104 MG/DL — HIGH (ref 70–99)
GLUCOSE SERPL-MCNC: 96 MG/DL — SIGNIFICANT CHANGE UP (ref 70–99)
HCO3 BLDA-SCNC: 22 MMOL/L — SIGNIFICANT CHANGE UP (ref 21–28)
HCO3 BLDV-SCNC: 23 MMOL/L — SIGNIFICANT CHANGE UP (ref 22–29)
HCO3 BLDV-SCNC: 23 MMOL/L — SIGNIFICANT CHANGE UP (ref 22–29)
HCO3 BLDV-SCNC: 25 MMOL/L — SIGNIFICANT CHANGE UP (ref 22–29)
HCT VFR BLD CALC: 27 % — LOW (ref 39–50)
HCT VFR BLD CALC: 27.4 % — LOW (ref 39–50)
HCT VFR BLD CALC: 29.7 % — LOW (ref 39–50)
HGB BLD-MCNC: 10.4 G/DL — LOW (ref 13–17)
HGB BLD-MCNC: 9.6 G/DL — LOW (ref 13–17)
HGB BLD-MCNC: 9.7 G/DL — LOW (ref 13–17)
INR BLD: 1.02 — SIGNIFICANT CHANGE UP (ref 0.88–1.16)
INR BLD: 1.08 — SIGNIFICANT CHANGE UP (ref 0.88–1.16)
INR BLD: 1.1 — SIGNIFICANT CHANGE UP (ref 0.88–1.16)
LACTATE SERPL-SCNC: 0.9 MMOL/L — SIGNIFICANT CHANGE UP (ref 0.5–2)
LACTATE SERPL-SCNC: 0.9 MMOL/L — SIGNIFICANT CHANGE UP (ref 0.5–2)
LACTATE SERPL-SCNC: 1.1 MMOL/L — SIGNIFICANT CHANGE UP (ref 0.5–2)
MAGNESIUM SERPL-MCNC: 2.2 MG/DL — SIGNIFICANT CHANGE UP (ref 1.6–2.6)
MAGNESIUM SERPL-MCNC: 2.3 MG/DL — SIGNIFICANT CHANGE UP (ref 1.6–2.6)
MAGNESIUM SERPL-MCNC: 2.3 MG/DL — SIGNIFICANT CHANGE UP (ref 1.6–2.6)
MCHC RBC-ENTMCNC: 30.6 PG — SIGNIFICANT CHANGE UP (ref 27–34)
MCHC RBC-ENTMCNC: 31.2 PG — SIGNIFICANT CHANGE UP (ref 27–34)
MCHC RBC-ENTMCNC: 31.3 PG — SIGNIFICANT CHANGE UP (ref 27–34)
MCHC RBC-ENTMCNC: 35 GM/DL — SIGNIFICANT CHANGE UP (ref 32–36)
MCHC RBC-ENTMCNC: 35.4 GM/DL — SIGNIFICANT CHANGE UP (ref 32–36)
MCHC RBC-ENTMCNC: 35.6 GM/DL — SIGNIFICANT CHANGE UP (ref 32–36)
MCV RBC AUTO: 87.4 FL — SIGNIFICANT CHANGE UP (ref 80–100)
MCV RBC AUTO: 87.7 FL — SIGNIFICANT CHANGE UP (ref 80–100)
MCV RBC AUTO: 88.4 FL — SIGNIFICANT CHANGE UP (ref 80–100)
NRBC # BLD: 1 /100 WBCS — HIGH (ref 0–0)
PCO2 BLDA: 30 MMHG — LOW (ref 35–48)
PCO2 BLDV: 33 MMHG — LOW (ref 42–55)
PCO2 BLDV: 36 MMHG — LOW (ref 42–55)
PCO2 BLDV: 39 MMHG — LOW (ref 42–55)
PH BLDA: 7.48 — HIGH (ref 7.35–7.45)
PH BLDV: 7.41 — SIGNIFICANT CHANGE UP (ref 7.32–7.43)
PH BLDV: 7.41 — SIGNIFICANT CHANGE UP (ref 7.32–7.43)
PH BLDV: 7.45 — HIGH (ref 7.32–7.43)
PHOSPHATE SERPL-MCNC: 2.9 MG/DL — SIGNIFICANT CHANGE UP (ref 2.5–4.5)
PHOSPHATE SERPL-MCNC: 3 MG/DL — SIGNIFICANT CHANGE UP (ref 2.5–4.5)
PLATELET # BLD AUTO: 22 K/UL — LOW (ref 150–400)
PLATELET # BLD AUTO: 34 K/UL — LOW (ref 150–400)
PLATELET # BLD AUTO: 43 K/UL — LOW (ref 150–400)
PO2 BLDA: 106 MMHG — SIGNIFICANT CHANGE UP (ref 83–108)
PO2 BLDV: 34 MMHG — SIGNIFICANT CHANGE UP (ref 25–45)
PO2 BLDV: 40 MMHG — SIGNIFICANT CHANGE UP (ref 25–45)
PO2 BLDV: 47 MMHG — HIGH (ref 25–45)
POTASSIUM SERPL-MCNC: 3.7 MMOL/L — SIGNIFICANT CHANGE UP (ref 3.5–5.3)
POTASSIUM SERPL-MCNC: 4.1 MMOL/L — SIGNIFICANT CHANGE UP (ref 3.5–5.3)
POTASSIUM SERPL-MCNC: 4.3 MMOL/L — SIGNIFICANT CHANGE UP (ref 3.5–5.3)
POTASSIUM SERPL-SCNC: 3.7 MMOL/L — SIGNIFICANT CHANGE UP (ref 3.5–5.3)
POTASSIUM SERPL-SCNC: 4.1 MMOL/L — SIGNIFICANT CHANGE UP (ref 3.5–5.3)
POTASSIUM SERPL-SCNC: 4.3 MMOL/L — SIGNIFICANT CHANGE UP (ref 3.5–5.3)
PROT SERPL-MCNC: 4.5 G/DL — LOW (ref 6–8.3)
PROT SERPL-MCNC: 4.6 G/DL — LOW (ref 6–8.3)
PROT SERPL-MCNC: 5 G/DL — LOW (ref 6–8.3)
PROTHROM AB SERPL-ACNC: 12.1 SEC — SIGNIFICANT CHANGE UP (ref 10.5–13.4)
PROTHROM AB SERPL-ACNC: 12.9 SEC — SIGNIFICANT CHANGE UP (ref 10.5–13.4)
PROTHROM AB SERPL-ACNC: 13.1 SEC — SIGNIFICANT CHANGE UP (ref 10.5–13.4)
RBC # BLD: 3.08 M/UL — LOW (ref 4.2–5.8)
RBC # BLD: 3.1 M/UL — LOW (ref 4.2–5.8)
RBC # BLD: 3.4 M/UL — LOW (ref 4.2–5.8)
RBC # FLD: 15.5 % — HIGH (ref 10.3–14.5)
RBC # FLD: 15.9 % — HIGH (ref 10.3–14.5)
RBC # FLD: 15.9 % — HIGH (ref 10.3–14.5)
SAO2 % BLDA: 98.5 % — HIGH (ref 94–98)
SAO2 % BLDV: 61.2 % — LOW (ref 67–88)
SAO2 % BLDV: 69 % — SIGNIFICANT CHANGE UP (ref 67–88)
SAO2 % BLDV: 81.9 % — SIGNIFICANT CHANGE UP (ref 67–88)
SODIUM SERPL-SCNC: 132 MMOL/L — LOW (ref 135–145)
SODIUM SERPL-SCNC: 135 MMOL/L — SIGNIFICANT CHANGE UP (ref 135–145)
SODIUM SERPL-SCNC: 135 MMOL/L — SIGNIFICANT CHANGE UP (ref 135–145)
WBC # BLD: 9.23 K/UL — SIGNIFICANT CHANGE UP (ref 3.8–10.5)
WBC # BLD: 9.37 K/UL — SIGNIFICANT CHANGE UP (ref 3.8–10.5)
WBC # BLD: 9.61 K/UL — SIGNIFICANT CHANGE UP (ref 3.8–10.5)
WBC # FLD AUTO: 9.23 K/UL — SIGNIFICANT CHANGE UP (ref 3.8–10.5)
WBC # FLD AUTO: 9.37 K/UL — SIGNIFICANT CHANGE UP (ref 3.8–10.5)
WBC # FLD AUTO: 9.61 K/UL — SIGNIFICANT CHANGE UP (ref 3.8–10.5)

## 2023-03-25 RX ORDER — CISATRACURIUM BESYLATE 2 MG/ML
10 INJECTION INTRAVENOUS ONCE
Refills: 0 | Status: COMPLETED | OUTPATIENT
Start: 2023-03-25 | End: 2023-03-25

## 2023-03-25 RX ORDER — NICARDIPINE HYDROCHLORIDE 30 MG/1
5 CAPSULE, EXTENDED RELEASE ORAL
Qty: 40 | Refills: 0 | Status: DISCONTINUED | OUTPATIENT
Start: 2023-03-25 | End: 2023-03-25

## 2023-03-25 RX ORDER — ACETAMINOPHEN 500 MG
1000 TABLET ORAL ONCE
Refills: 0 | Status: COMPLETED | OUTPATIENT
Start: 2023-03-25 | End: 2023-03-25

## 2023-03-25 RX ORDER — PIPERACILLIN AND TAZOBACTAM 4; .5 G/20ML; G/20ML
2.26 INJECTION, POWDER, LYOPHILIZED, FOR SOLUTION INTRAVENOUS EVERY 6 HOURS
Refills: 0 | Status: DISCONTINUED | OUTPATIENT
Start: 2023-03-25 | End: 2023-03-26

## 2023-03-25 RX ORDER — PROPOFOL 10 MG/ML
10 INJECTION, EMULSION INTRAVENOUS
Qty: 500 | Refills: 0 | Status: DISCONTINUED | OUTPATIENT
Start: 2023-03-25 | End: 2023-03-27

## 2023-03-25 RX ORDER — POTASSIUM CHLORIDE 20 MEQ
20 PACKET (EA) ORAL ONCE
Refills: 0 | Status: COMPLETED | OUTPATIENT
Start: 2023-03-25 | End: 2023-03-25

## 2023-03-25 RX ORDER — FENTANYL CITRATE 50 UG/ML
25 INJECTION INTRAVENOUS
Refills: 0 | Status: DISCONTINUED | OUTPATIENT
Start: 2023-03-25 | End: 2023-03-27

## 2023-03-25 RX ORDER — FENTANYL CITRATE 50 UG/ML
50 INJECTION INTRAVENOUS ONCE
Refills: 0 | Status: DISCONTINUED | OUTPATIENT
Start: 2023-03-25 | End: 2023-03-25

## 2023-03-25 RX ORDER — FENTANYL CITRATE 50 UG/ML
25 INJECTION INTRAVENOUS ONCE
Refills: 0 | Status: DISCONTINUED | OUTPATIENT
Start: 2023-03-25 | End: 2023-03-25

## 2023-03-25 RX ORDER — PROPOFOL 10 MG/ML
5 INJECTION, EMULSION INTRAVENOUS ONCE
Refills: 0 | Status: COMPLETED | OUTPATIENT
Start: 2023-03-25 | End: 2023-03-25

## 2023-03-25 RX ORDER — CALCIUM GLUCONATE 100 MG/ML
2 VIAL (ML) INTRAVENOUS ONCE
Refills: 0 | Status: COMPLETED | OUTPATIENT
Start: 2023-03-25 | End: 2023-03-25

## 2023-03-25 RX ORDER — BUMETANIDE 0.25 MG/ML
2 INJECTION INTRAMUSCULAR; INTRAVENOUS EVERY 8 HOURS
Refills: 0 | Status: DISCONTINUED | OUTPATIENT
Start: 2023-03-25 | End: 2023-03-28

## 2023-03-25 RX ORDER — AMIODARONE HYDROCHLORIDE 400 MG/1
0.5 TABLET ORAL
Qty: 450 | Refills: 0 | Status: DISCONTINUED | OUTPATIENT
Start: 2023-03-25 | End: 2023-03-26

## 2023-03-25 RX ORDER — DEXMEDETOMIDINE HYDROCHLORIDE IN 0.9% SODIUM CHLORIDE 4 UG/ML
0.4 INJECTION INTRAVENOUS
Qty: 400 | Refills: 0 | Status: DISCONTINUED | OUTPATIENT
Start: 2023-03-25 | End: 2023-03-29

## 2023-03-25 RX ADMIN — CHLORHEXIDINE GLUCONATE 1 APPLICATION(S): 213 SOLUTION TOPICAL at 05:00

## 2023-03-25 RX ADMIN — FENTANYL CITRATE 25 MICROGRAM(S): 50 INJECTION INTRAVENOUS at 12:38

## 2023-03-25 RX ADMIN — Medication 100 MILLIEQUIVALENT(S): at 18:55

## 2023-03-25 RX ADMIN — Medication 57.5 MILLIGRAM(S): at 06:15

## 2023-03-25 RX ADMIN — FENTANYL CITRATE 50 MICROGRAM(S): 50 INJECTION INTRAVENOUS at 00:50

## 2023-03-25 RX ADMIN — Medication 10 MILLIGRAM(S): at 00:23

## 2023-03-25 RX ADMIN — Medication 57.5 MILLIGRAM(S): at 18:56

## 2023-03-25 RX ADMIN — CHLORHEXIDINE GLUCONATE 15 MILLILITER(S): 213 SOLUTION TOPICAL at 19:55

## 2023-03-25 RX ADMIN — BUMETANIDE 2 MILLIGRAM(S): 0.25 INJECTION INTRAMUSCULAR; INTRAVENOUS at 16:00

## 2023-03-25 RX ADMIN — PROPOFOL 5 MILLIGRAM(S): 10 INJECTION, EMULSION INTRAVENOUS at 16:56

## 2023-03-25 RX ADMIN — CHLORHEXIDINE GLUCONATE 15 MILLILITER(S): 213 SOLUTION TOPICAL at 05:00

## 2023-03-25 RX ADMIN — Medication 400 MILLIGRAM(S): at 01:10

## 2023-03-25 RX ADMIN — PANTOPRAZOLE SODIUM 40 MILLIGRAM(S): 20 TABLET, DELAYED RELEASE ORAL at 12:39

## 2023-03-25 RX ADMIN — LACOSAMIDE 120 MILLIGRAM(S): 50 TABLET ORAL at 06:05

## 2023-03-25 RX ADMIN — CISATRACURIUM BESYLATE 10 MILLIGRAM(S): 2 INJECTION INTRAVENOUS at 16:55

## 2023-03-25 RX ADMIN — BUMETANIDE 2 MILLIGRAM(S): 0.25 INJECTION INTRAMUSCULAR; INTRAVENOUS at 06:00

## 2023-03-25 RX ADMIN — NICARDIPINE HYDROCHLORIDE 25 MG/HR: 30 CAPSULE, EXTENDED RELEASE ORAL at 11:23

## 2023-03-25 RX ADMIN — LACOSAMIDE 120 MILLIGRAM(S): 50 TABLET ORAL at 19:23

## 2023-03-25 RX ADMIN — Medication 200 GRAM(S): at 14:00

## 2023-03-25 NOTE — PROCEDURE NOTE - ADDITIONAL PROCEDURE DETAILS
Guidewire visualized in L CFV in two views prior to dilation.
swan karthik cath inserted vacordia zeyad at 50 cms    dual insertion for venous port needed and lij for eckrt subclavian risky due to plt ct 35

## 2023-03-25 NOTE — PROGRESS NOTE ADULT - ASSESSMENT
52 year old male, current every day marijuana use with a past medical hx of HTN, type A aortic dissection s/p Dacron grafts, AV resuspension in 2013,CAD s/p CABG x 1 SVG to RCA (5/2013 with Dr. Medina), seizure disorder (last episode on 7/4/22) now s/p prolonged replacement of transverse aortic arch, second stage thoracic endovascular aortic repair, aorto-axillary bypass, AV replacement (bio 23mm), and CABG x 1 (SVG-RCA) EF 75% with Dr. Pierre 3/20. C/c/b refractory acidosis requiring CVVHD, hypoxia requiring bronchoscopy, and AFib, currently on an amio gtt, and thrombocytopenia. Stroke consulted as patient is having difficulty waking up from prolonged surgery. CTH negative for acute hemorrhage or infarct.     **** INCOMPLETE*****  Patient is a 52 year old with a history of daily marijuana use, HTN, CAD s/p CABG 2013, type A aortic dissection s/p repair 2013, seizure disorder (last seizure 07/2022) who underwent aortic arch replacement under prolonged sedation with Dr. Pierre on 3/20/23 with post-operative course complicated by renal failure requiring CVVHD, Afib, thrombocytopenia, and hypotension requiring pressor support. Stroke neurology consulted because patient slow to wake up when sedation was held. CT head without acute ischemia or bleed. Patient on AEDs per Epilepsy. Today, his exam is significant improved - he wakes up to verbal stimuli, can track provider, follows simple commands, and can lift all extremity antigravity without evidence of focal weakness. Would not pursue any further stroke neurologic testing at this time. Believe his difficulty waking up after surgery was likely due to prolonged sedation, in the setting of renal failure, and poor clearance of anesthetics; not an ischemic event.     - AEDs per Epilepsy team  - Rest of care per primary team    Stroke neurology will sign off.   Thank you for allowing us to participate in the care of this patient, please call stroke Neurology with questions as needed.    Case discussed with Stroke Attending, Dr. Sofie Colin

## 2023-03-25 NOTE — PROGRESS NOTE ADULT - SUBJECTIVE AND OBJECTIVE BOX
CTICU  CRITICAL  CARE  PROCEDURE  NOTE      				     Name of procedure – Flexible fiberoptic bronchoscopy      Flexible fiberoptic bronchoscopy was performed under propofol and fentanyl sedation while the patient was intubated for controlled mechanical ventilation  Pre-procedural assessment reveals no risk of Tb  Ventilator settings were adjusted to reduce airway pressures to reduce the risk of ptx  The scope was advanced past the ett which was noted to be 2 cm the leeanne   The leeanne was sharp  Right LL and RML air way were patent , mucopur thick secretions  Lavaged and sent for culture  Left LL air way  with copious purulent secretions, lavaged, and sent for culture  CXR confirmed no pneumothorax post bronch  There were no complications  The patient tolerated the procedure well

## 2023-03-25 NOTE — PROGRESS NOTE ADULT - SUBJECTIVE AND OBJECTIVE BOX
Neurology Stroke Progress Note    INTERVAL HPI/OVERNIGHT EVENTS:    MEDICATIONS  (STANDING):  acetaminophen   IVPB .. 1000 milliGRAM(s) IV Intermittent once  aMIOdarone Infusion 0.5 mG/Min (16.7 mL/Hr) IV Continuous <Continuous>  aspirin  chewable 81 milliGRAM(s) Enteral Tube daily  buMETAnide Injectable 2 milliGRAM(s) IV Push every 8 hours  chlorhexidine 0.12% Liquid 15 milliLiter(s) Oral Mucosa every 12 hours  chlorhexidine 2% Cloths 1 Application(s) Topical daily  dextrose 10%. 250 milliLiter(s) (500 mL/Hr) IV Continuous <Continuous>  dextrose 10%. 50 milliLiter(s) (10 mL/Hr) IV Continuous <Continuous>  dextrose 5%. 1000 milliLiter(s) (50 mL/Hr) IV Continuous <Continuous>  dextrose 5%. 1000 milliLiter(s) (100 mL/Hr) IV Continuous <Continuous>  dextrose 50% Injectable 50 milliLiter(s) IV Push every 15 minutes  dextrose 50% Injectable 25 milliLiter(s) IV Push every 15 minutes  fentaNYL    Injectable 50 MICROGram(s) IV Push once  glucagon  Injectable 1 milliGRAM(s) IntraMuscular once  insulin lispro (ADMELOG) corrective regimen sliding scale   SubCutaneous every 6 hours  lacosamide IVPB 100 milliGRAM(s) IV Intermittent every 12 hours  metoclopramide Injectable 10 milliGRAM(s) IV Push every 12 hours  niCARdipine Infusion 5 mG/Hr (25 mL/Hr) IV Continuous <Continuous>  pantoprazole  Injectable 40 milliGRAM(s) IV Push daily  phenylephrine    Infusion 0.2 MICROgram(s)/kG/Min (5.78 mL/Hr) IV Continuous <Continuous>  sodium chloride 0.9%. 1000 milliLiter(s) (10 mL/Hr) IV Continuous <Continuous>  valproate sodium  IVPB 750 milliGRAM(s) IV Intermittent every 12 hours  vasopressin Infusion 0.03 Unit(s)/Min (4.5 mL/Hr) IV Continuous <Continuous>    MEDICATIONS  (PRN):  dextrose Oral Gel 15 Gram(s) Oral once PRN Blood Glucose LESS THAN 70 milliGRAM(s)/deciliter    Allergies  No Known Allergies    Vital Signs Last 24 Hrs  T(C): 36.1 (24 Mar 2023 17:42), Max: 37.9 (24 Mar 2023 12:00)  T(F): 96.9 (24 Mar 2023 17:42), Max: 100.2 (24 Mar 2023 12:00)  HR: 98 (24 Mar 2023 22:00) (87 - 103)  RR: 18 (24 Mar 2023 22:00) (14 - 25)  SpO2: 100% (24 Mar 2023 22:00) (100% - 100%)    Parameters below as of 24 Mar 2023 22:00  Patient On (Oxygen Delivery Method): ventilator    O2 Concentration (%): 50    Physical exam:  General: No acute distress, awake and alert  Eyes: Anicteric sclerae, moist conjunctivae, see below for CNs  Neck: trachea midline, FROM, supple, no thyromegaly or lymphadenopathy  Cardiovascular: Regular rate and rhythm, no murmurs, rubs, or gallops. No carotid bruits.   Pulmonary: Anterior breath sounds clear bilaterally, no crackles or wheezing. No use of accessory muscles  GI: Abdomen soft, non-distended, non-tender  Extremities: Radial and DP pulses +2, no edema    Neurologic:  -Mental status: Awake, alert, oriented to person, place, and time. Speech is fluent with intact naming, repetition, and comprehension, no dysarthria. Recent and remote memory intact. Follows commands. Attention/concentration intact. Fund of knowledge appropriate.  -Cranial nerves:   II: Visual fields are full to confrontation.  III, IV, VI: Extraocular movements are intact without nystagmus. Pupils equally round and reactive to light  V:  Facial sensation V1-V3 equal and intact   VII: Face is symmetric with normal eye closure and smile  VIII: Hearing is bilaterally intact to finger rub  IX, X: Uvula is midline and soft palate rises symmetrically  XI: Head turning and shoulder shrug are intact.  XII: Tongue protrudes midline  Motor: Normal bulk and tone. No pronator drift. Strength bilateral upper extremity 5/5, bilateral lower extremities 5/5.  Rapid alternating movements intact and symmetric  Sensation: Intact to light touch bilaterally. No neglect or extinction on double simultaneous testing.  Coordination: No dysmetria on finger-to-nose and heel-to-shin bilaterally  Reflexes: Downgoing toes bilaterally   Gait: Narrow gait and steady    LABS:                        9.6    9.37  )-----------( 43       ( 25 Mar 2023 03:29 )             27.0     135  |  103  |  39<H>  ----------------------------<  104<H>  4.1   |  23  |  2.87<H>    Ca    8.0<L>      25 Mar 2023 03:29  Phos  3.0     03-25  Mg     2.3     03-25    TPro  4.6<L>  /  Alb  2.4<L>  /  TBili  1.2  /  DBili  x   /  AST  86<H>  /  ALT  13  /  AlkPhos  84  03-25    PT/INR - ( 25 Mar 2023 03:29 )   PT: 12.1 sec;   INR: 1.02          PTT - ( 25 Mar 2023 03:29 )  PTT:33.8 sec    RADIOLOGY & ADDITIONAL TESTS:  < from: CT Head No Cont (03.23.23 @ 15:01) >  No acute intracranial hemorrhage, mass effect or demarcated territorial   infarction. Neurology Stroke Progress Note    INTERVAL HPI/OVERNIGHT EVENTS: Patient seen and examined. He is awake, follows commands, can track provider, all extremities antigravity.     MEDICATIONS  (STANDING):  acetaminophen   IVPB .. 1000 milliGRAM(s) IV Intermittent once  aMIOdarone Infusion 0.5 mG/Min (16.7 mL/Hr) IV Continuous <Continuous>  aspirin  chewable 81 milliGRAM(s) Enteral Tube daily  buMETAnide Injectable 2 milliGRAM(s) IV Push every 8 hours  chlorhexidine 0.12% Liquid 15 milliLiter(s) Oral Mucosa every 12 hours  chlorhexidine 2% Cloths 1 Application(s) Topical daily  dextrose 10%. 250 milliLiter(s) (500 mL/Hr) IV Continuous <Continuous>  dextrose 10%. 50 milliLiter(s) (10 mL/Hr) IV Continuous <Continuous>  dextrose 5%. 1000 milliLiter(s) (50 mL/Hr) IV Continuous <Continuous>  dextrose 5%. 1000 milliLiter(s) (100 mL/Hr) IV Continuous <Continuous>  dextrose 50% Injectable 50 milliLiter(s) IV Push every 15 minutes  dextrose 50% Injectable 25 milliLiter(s) IV Push every 15 minutes  fentaNYL    Injectable 50 MICROGram(s) IV Push once  glucagon  Injectable 1 milliGRAM(s) IntraMuscular once  insulin lispro (ADMELOG) corrective regimen sliding scale   SubCutaneous every 6 hours  lacosamide IVPB 100 milliGRAM(s) IV Intermittent every 12 hours  metoclopramide Injectable 10 milliGRAM(s) IV Push every 12 hours  niCARdipine Infusion 5 mG/Hr (25 mL/Hr) IV Continuous <Continuous>  pantoprazole  Injectable 40 milliGRAM(s) IV Push daily  phenylephrine    Infusion 0.2 MICROgram(s)/kG/Min (5.78 mL/Hr) IV Continuous <Continuous>  sodium chloride 0.9%. 1000 milliLiter(s) (10 mL/Hr) IV Continuous <Continuous>  valproate sodium  IVPB 750 milliGRAM(s) IV Intermittent every 12 hours  vasopressin Infusion 0.03 Unit(s)/Min (4.5 mL/Hr) IV Continuous <Continuous>    MEDICATIONS  (PRN):  dextrose Oral Gel 15 Gram(s) Oral once PRN Blood Glucose LESS THAN 70 milliGRAM(s)/deciliter    Allergies  No Known Allergies    Vital Signs Last 24 Hrs  T(C): 36.1 (24 Mar 2023 17:42), Max: 37.9 (24 Mar 2023 12:00)  T(F): 96.9 (24 Mar 2023 17:42), Max: 100.2 (24 Mar 2023 12:00)  HR: 98 (24 Mar 2023 22:00) (87 - 103)  RR: 18 (24 Mar 2023 22:00) (14 - 25)  SpO2: 100% (24 Mar 2023 22:00) (100% - 100%)    Parameters below as of 24 Mar 2023 22:00  Patient On (Oxygen Delivery Method): ventilator    O2 Concentration (%): 50    Physical exam:  Neurologic:  -Mental status: Awakens to verbal stimuli. Tracks provider. Follows one step commands (thumbs up, squeeze hand, close eyes).  -Cranial nerves:   II: Blink to threat intact bilaterally.   III, IV, VI: Extraocular movements are intact without nystagmus. Pupils equally round and reactive to light  VII:  ETT tube in place, face appears symmetric  Motor: All extremities antigravity  Sensation: Intact to light touch throughout  Reflexes: Downgoing toes bilaterally     LABS:                        9.6    9.37  )-----------( 43       ( 25 Mar 2023 03:29 )             27.0     135  |  103  |  39<H>  ----------------------------<  104<H>  4.1   |  23  |  2.87<H>    Ca    8.0<L>      25 Mar 2023 03:29  Phos  3.0     03-25  Mg     2.3     03-25    TPro  4.6<L>  /  Alb  2.4<L>  /  TBili  1.2  /  DBili  x   /  AST  86<H>  /  ALT  13  /  AlkPhos  84  03-25    PT/INR - ( 25 Mar 2023 03:29 )   PT: 12.1 sec;   INR: 1.02          PTT - ( 25 Mar 2023 03:29 )  PTT:33.8 sec    RADIOLOGY & ADDITIONAL TESTS:  < from: CT Head No Cont (03.23.23 @ 15:01) >  No acute intracranial hemorrhage, mass effect or demarcated territorial   infarction.

## 2023-03-25 NOTE — PROGRESS NOTE ADULT - ASSESSMENT
52 year old male, current every day marijuana use w/pmh of HTN, type A aortic dissection s/p Dacron grafts, AV resuspension in 2013,CAD s/p CABG x 1 SVG to RCA (5/2013 with Dr. Medina), seizure disorder (last episode on 7/4/22) presents for a follow up visit for evaluation and management of his aortic pathology and deemed a candidate for a reop total arch replacement given aortic aneurysm and chronic dissection.  S/p AVR/ Arch replacement/ aorto-axillary bypass- CABG x 1 and TEVAR   3/20  Postop course with worsening renal failure and sig acidosis started on CVVHD  Afib 3/23  A/p   - MS and neuro exam intact- continue AEDs per epilepsy recs   - afib converted to sinus, continue low dose IV amio will transition to maintenance PO amio   - Off inotrops/ pressors- cardiac indices and mixed venous sats in good range- perfusion parameters remained normal with good UOP/ Normal Lactate and LFTs   - PFR improved post bronch, Armaan titrated up to 10 PPM- will continue current vent setting PEEP 8/FiO2 40%- CPAP in am   - TF advancing to goal, hypoglycemia improved- D10 infusion stopped   - Renal failure most likely iATN given long operation and clamp time/ required CVVHD for acidosis though non oliguric renal injury--> renal onboard plan to Currently good UOP on standing bumex, Lytes and bicarb in good range will need neg ~ 2-3 L FB daily for sig anasarca and extubation planning   no urgent need for dialysis as lytes and bicarb in normal range and hypervolemia responding to diuretic regimen   - H&H stable- thrombocytopenic plt ~ 20/ HIT x 2 neg/ repeat HIT panel sent today will follow   - Low grade fevers- UA, blood clx and BAL sent, prior clx NGTD   New double stick Central line and swan RIJ 3/25  LIJ HD cath 3/25  Old lines and fem catheters removed   Empiric zosyn started following bronch    ATTENDING: I have personally and independently provided 30 Min of critical care services. this excludes time spent on sperate procedures or teaching.

## 2023-03-25 NOTE — PROGRESS NOTE ADULT - SUBJECTIVE AND OBJECTIVE BOX
INTERVAl COURSE  S/p Reop total Arch/ AVR   POD#5  MS and neuro exam intact  off CVVHD with good diuretic response FB > 3 L Net neg sofar  Low grade fevers/ central line and HD cath exchanged today   Tolerated CPAP- though hypoxic on the gas- bronched for pulm hygiene with improving PFR     ICU Vital Signs Last 24 Hrs  T(C): 38 (25 Mar 2023 22:00), Max: 38.2 (25 Mar 2023 10:00)  T(F): 100.4 (25 Mar 2023 22:00), Max: 100.8 (25 Mar 2023 10:00)  HR: 72 (25 Mar 2023 23:19) (67 - 93)  ABP: 118/58 (25 Mar 2023 23:19) (88/43 - 173/75)  ABP(mean): 71 (25 Mar 2023 23:19) (56 - 97)  RR: 14 (25 Mar 2023 23:19) (12 - 25)  SpO2: 100% (25 Mar 2023 23:19) (96% - 100%)  O2 Parameters below as of 25 Mar 2023 23:19  Patient On (Oxygen Delivery Method): ventilator  O2 Concentration (%): 40      Adult Advanced Hemodynamics Last 24 Hrs  CVP(mm Hg): 13 (25 Mar 2023 23:19) (10 - 19)  CO: 5 (25 Mar 2023 23:00) (5 - 5.1)  CI: 2.5 (25 Mar 2023 23:00) (2.5 - 2.6)  PA: 26/17 (25 Mar 2023 23:19) (23/14 - 39/29)  PA(mean): 22 (25 Mar 2023 23:19) (19 - 34)  SVR: 991 (25 Mar 2023 23:00) (991 - 1207)  SVRI: 1970 (25 Mar 2023 23:00) (1970 - 2399)  ABG - ( 25 Mar 2023 19:44 )  pH, Arterial: 7.46  pH, Blood: x     /  pCO2: 32    /  pO2: 114   / HCO3: 23    / Base Excess: -0.3  /  SaO2: 99.4        Mode: AC/ CMV (Assist Control/ Continuous Mandatory Ventilation)  RR (machine): 12  TV (machine): 650  FiO2: 40  PEEP: 8  ITime: 1  MAP: 12  PIP: 24    I&O's Summary  24 Mar 2023 07:01  -  25 Mar 2023 07:00  --------------------------------------------------------  IN: 1142.2 mL / OUT: 2431 mL / NET: -1288.8 mL    25 Mar 2023 07:01  -  25 Mar 2023 23:26  --------------------------------------------------------  IN: 1636.1 mL / OUT: 4655 mL / NET: -3018.9 mL    PHYSICAL EXAM  General: Alert and responsive, nods to questions appropriately   Respiratory: CTA B/L; no wheezes  Cardiovascular: NSR   Gastrointestinal: Soft; NTND   Extremities: Warm, dopplerable pulses, ansarcic mostly upper extremities and scrotum   Neurological: Nonfocal     MEDICATIONS  (STANDING):  aMIOdarone Infusion 0.5 mG/Min (16.7 mL/Hr) IV Continuous <Continuous>  aMIOdarone Infusion 0.5 mG/Min (16.7 mL/Hr) IV Continuous <Continuous>  aspirin  chewable 81 milliGRAM(s) Enteral Tube daily  buMETAnide Injectable 2 milliGRAM(s) IV Push every 8 hours  chlorhexidine 0.12% Liquid 15 milliLiter(s) Oral Mucosa every 12 hours  chlorhexidine 2% Cloths 1 Application(s) Topical daily  dexMEDEtomidine Infusion 0.4 MICROgram(s)/kG/Hr (7.71 mL/Hr) IV Continuous <Continuous>  dextrose 5%. 1000 milliLiter(s) (50 mL/Hr) IV Continuous <Continuous>  dextrose 5%. 1000 milliLiter(s) (100 mL/Hr) IV Continuous <Continuous>  dextrose 50% Injectable 50 milliLiter(s) IV Push every 15 minutes  dextrose 50% Injectable 25 milliLiter(s) IV Push every 15 minutes  glucagon  Injectable 1 milliGRAM(s) IntraMuscular once  insulin lispro (ADMELOG) corrective regimen sliding scale   SubCutaneous every 6 hours  lacosamide IVPB 100 milliGRAM(s) IV Intermittent every 12 hours  pantoprazole  Injectable 40 milliGRAM(s) IV Push daily  phenylephrine    Infusion 0.2 MICROgram(s)/kG/Min (5.78 mL/Hr) IV Continuous <Continuous>  piperacillin/tazobactam IVPB.. 2.26 Gram(s) IV Intermittent every 6 hours  propofol Infusion 10 MICROgram(s)/kG/Min (4.63 mL/Hr) IV Continuous <Continuous>  sodium chloride 0.9%. 1000 milliLiter(s) (10 mL/Hr) IV Continuous <Continuous>  valproate sodium  IVPB 750 milliGRAM(s) IV Intermittent every 12 hours    MEDICATIONS  (PRN):  dextrose Oral Gel 15 Gram(s) Oral once PRN Blood Glucose LESS THAN 70 milliGRAM(s)/deciliter  fentaNYL    Injectable 25 MICROGram(s) IV Push every 3 hours PRN Severe Pain (7 - 10)      LABS                        9.7    9.23  )-----------( 22       ( 25 Mar 2023 19:44 )             27.4     03-25    135  |  104  |  58<H>  ----------------------------<  96  4.3   |  23  |  3.43<H>    Ca    8.5      25 Mar 2023 19:44  Phos  2.9     03-25  Mg     2.2     03-25    TPro  4.5<L>  /  Alb  2.0<L>  /  TBili  0.8  /  DBili  x   /  AST  49<H>  /  ALT  10  /  AlkPhos  80  03-25    LIVER FUNCTIONS - ( 25 Mar 2023 19:44 )  Alb: 2.0 g/dL / Pro: 4.5 g/dL / ALK PHOS: 80 U/L / ALT: 10 U/L / AST: 49 U/L / GGT: x           PT/INR - ( 25 Mar 2023 19:44 )   PT: 13.1 sec;   INR: 1.10          PTT - ( 25 Mar 2023 19:44 )  PTT:33.7 sec    CARDIAC MARKERS ( 24 Mar 2023 20:53 )  x     / x     / 886 U/L / x     / x        IMAGING/EKG/ETC  Reviewed.

## 2023-03-26 LAB
ALBUMIN SERPL ELPH-MCNC: 1.9 G/DL — LOW (ref 3.3–5)
ALBUMIN SERPL ELPH-MCNC: 2 G/DL — LOW (ref 3.3–5)
ALBUMIN SERPL ELPH-MCNC: 2.5 G/DL — LOW (ref 3.3–5)
ALP SERPL-CCNC: 70 U/L — SIGNIFICANT CHANGE UP (ref 40–120)
ALP SERPL-CCNC: 74 U/L — SIGNIFICANT CHANGE UP (ref 40–120)
ALP SERPL-CCNC: 79 U/L — SIGNIFICANT CHANGE UP (ref 40–120)
ALT FLD-CCNC: 10 U/L — SIGNIFICANT CHANGE UP (ref 10–45)
ALT FLD-CCNC: 10 U/L — SIGNIFICANT CHANGE UP (ref 10–45)
ALT FLD-CCNC: 9 U/L — LOW (ref 10–45)
ANION GAP SERPL CALC-SCNC: 10 MMOL/L — SIGNIFICANT CHANGE UP (ref 5–17)
ANION GAP SERPL CALC-SCNC: 11 MMOL/L — SIGNIFICANT CHANGE UP (ref 5–17)
ANION GAP SERPL CALC-SCNC: 11 MMOL/L — SIGNIFICANT CHANGE UP (ref 5–17)
APPEARANCE UR: CLEAR — SIGNIFICANT CHANGE UP
APTT BLD: 29.4 SEC — SIGNIFICANT CHANGE UP (ref 27.5–35.5)
APTT BLD: 30.3 SEC — SIGNIFICANT CHANGE UP (ref 27.5–35.5)
APTT BLD: 31.9 SEC — SIGNIFICANT CHANGE UP (ref 27.5–35.5)
AST SERPL-CCNC: 34 U/L — SIGNIFICANT CHANGE UP (ref 10–40)
AST SERPL-CCNC: 39 U/L — SIGNIFICANT CHANGE UP (ref 10–40)
AST SERPL-CCNC: 42 U/L — HIGH (ref 10–40)
BACTERIA # UR AUTO: PRESENT /HPF
BILIRUB SERPL-MCNC: 0.7 MG/DL — SIGNIFICANT CHANGE UP (ref 0.2–1.2)
BILIRUB SERPL-MCNC: 0.7 MG/DL — SIGNIFICANT CHANGE UP (ref 0.2–1.2)
BILIRUB SERPL-MCNC: 0.9 MG/DL — SIGNIFICANT CHANGE UP (ref 0.2–1.2)
BILIRUB UR-MCNC: NEGATIVE — SIGNIFICANT CHANGE UP
BUN SERPL-MCNC: 63 MG/DL — HIGH (ref 7–23)
BUN SERPL-MCNC: 70 MG/DL — HIGH (ref 7–23)
BUN SERPL-MCNC: 76 MG/DL — HIGH (ref 7–23)
CALCIUM SERPL-MCNC: 8.1 MG/DL — LOW (ref 8.4–10.5)
CALCIUM SERPL-MCNC: 8.4 MG/DL — SIGNIFICANT CHANGE UP (ref 8.4–10.5)
CALCIUM SERPL-MCNC: 8.6 MG/DL — SIGNIFICANT CHANGE UP (ref 8.4–10.5)
CHLORIDE SERPL-SCNC: 103 MMOL/L — SIGNIFICANT CHANGE UP (ref 96–108)
CHLORIDE SERPL-SCNC: 104 MMOL/L — SIGNIFICANT CHANGE UP (ref 96–108)
CHLORIDE SERPL-SCNC: 104 MMOL/L — SIGNIFICANT CHANGE UP (ref 96–108)
CO2 SERPL-SCNC: 22 MMOL/L — SIGNIFICANT CHANGE UP (ref 22–31)
CO2 SERPL-SCNC: 22 MMOL/L — SIGNIFICANT CHANGE UP (ref 22–31)
CO2 SERPL-SCNC: 23 MMOL/L — SIGNIFICANT CHANGE UP (ref 22–31)
COLOR SPEC: YELLOW — SIGNIFICANT CHANGE UP
CREAT SERPL-MCNC: 3.69 MG/DL — HIGH (ref 0.5–1.3)
CREAT SERPL-MCNC: 3.85 MG/DL — HIGH (ref 0.5–1.3)
CREAT SERPL-MCNC: 3.97 MG/DL — HIGH (ref 0.5–1.3)
DIFF PNL FLD: ABNORMAL
EGFR: 17 ML/MIN/1.73M2 — LOW
EGFR: 18 ML/MIN/1.73M2 — LOW
EGFR: 19 ML/MIN/1.73M2 — LOW
EPI CELLS # UR: SIGNIFICANT CHANGE UP /HPF (ref 0–5)
GAS PNL BLDA: SIGNIFICANT CHANGE UP
GLUCOSE BLDC GLUCOMTR-MCNC: 108 MG/DL — HIGH (ref 70–99)
GLUCOSE BLDC GLUCOMTR-MCNC: 112 MG/DL — HIGH (ref 70–99)
GLUCOSE BLDC GLUCOMTR-MCNC: 80 MG/DL — SIGNIFICANT CHANGE UP (ref 70–99)
GLUCOSE BLDC GLUCOMTR-MCNC: 92 MG/DL — SIGNIFICANT CHANGE UP (ref 70–99)
GLUCOSE BLDC GLUCOMTR-MCNC: 96 MG/DL — SIGNIFICANT CHANGE UP (ref 70–99)
GLUCOSE SERPL-MCNC: 108 MG/DL — HIGH (ref 70–99)
GLUCOSE SERPL-MCNC: 110 MG/DL — HIGH (ref 70–99)
GLUCOSE SERPL-MCNC: 99 MG/DL — SIGNIFICANT CHANGE UP (ref 70–99)
GLUCOSE UR QL: NEGATIVE — SIGNIFICANT CHANGE UP
GRAM STN FLD: SIGNIFICANT CHANGE UP
HCT VFR BLD CALC: 25.5 % — LOW (ref 39–50)
HCT VFR BLD CALC: 25.5 % — LOW (ref 39–50)
HCT VFR BLD CALC: 27.1 % — LOW (ref 39–50)
HEPARIN-PF4 AB RESULT: <0.6 U/ML — SIGNIFICANT CHANGE UP (ref 0–0.9)
HEPARIN-PF4 AB RESULT: <0.6 U/ML — SIGNIFICANT CHANGE UP (ref 0–0.9)
HGB BLD-MCNC: 8.8 G/DL — LOW (ref 13–17)
HGB BLD-MCNC: 8.8 G/DL — LOW (ref 13–17)
HGB BLD-MCNC: 9.5 G/DL — LOW (ref 13–17)
INR BLD: 1.09 — SIGNIFICANT CHANGE UP (ref 0.88–1.16)
INR BLD: 1.11 — SIGNIFICANT CHANGE UP (ref 0.88–1.16)
INR BLD: 1.13 — SIGNIFICANT CHANGE UP (ref 0.88–1.16)
KETONES UR-MCNC: NEGATIVE — SIGNIFICANT CHANGE UP
LACTATE SERPL-SCNC: 1 MMOL/L — SIGNIFICANT CHANGE UP (ref 0.5–2)
LACTATE SERPL-SCNC: 1.1 MMOL/L — SIGNIFICANT CHANGE UP (ref 0.5–2)
LACTATE SERPL-SCNC: 1.1 MMOL/L — SIGNIFICANT CHANGE UP (ref 0.5–2)
LEUKOCYTE ESTERASE UR-ACNC: NEGATIVE — SIGNIFICANT CHANGE UP
MAGNESIUM SERPL-MCNC: 2.2 MG/DL — SIGNIFICANT CHANGE UP (ref 1.6–2.6)
MAGNESIUM SERPL-MCNC: 2.3 MG/DL — SIGNIFICANT CHANGE UP (ref 1.6–2.6)
MAGNESIUM SERPL-MCNC: 2.3 MG/DL — SIGNIFICANT CHANGE UP (ref 1.6–2.6)
MCHC RBC-ENTMCNC: 30.6 PG — SIGNIFICANT CHANGE UP (ref 27–34)
MCHC RBC-ENTMCNC: 30.7 PG — SIGNIFICANT CHANGE UP (ref 27–34)
MCHC RBC-ENTMCNC: 30.9 PG — SIGNIFICANT CHANGE UP (ref 27–34)
MCHC RBC-ENTMCNC: 34.5 GM/DL — SIGNIFICANT CHANGE UP (ref 32–36)
MCHC RBC-ENTMCNC: 34.5 GM/DL — SIGNIFICANT CHANGE UP (ref 32–36)
MCHC RBC-ENTMCNC: 35.1 GM/DL — SIGNIFICANT CHANGE UP (ref 32–36)
MCV RBC AUTO: 88.3 FL — SIGNIFICANT CHANGE UP (ref 80–100)
MCV RBC AUTO: 88.5 FL — SIGNIFICANT CHANGE UP (ref 80–100)
MCV RBC AUTO: 88.9 FL — SIGNIFICANT CHANGE UP (ref 80–100)
NITRITE UR-MCNC: NEGATIVE — SIGNIFICANT CHANGE UP
NRBC # BLD: 0 /100 WBCS — SIGNIFICANT CHANGE UP (ref 0–0)
NRBC # BLD: 0 /100 WBCS — SIGNIFICANT CHANGE UP (ref 0–0)
NRBC # BLD: 1 /100 WBCS — HIGH (ref 0–0)
PF4 HEPARIN CMPLX AB SER-ACNC: NEGATIVE — SIGNIFICANT CHANGE UP
PF4 HEPARIN CMPLX AB SER-ACNC: NEGATIVE — SIGNIFICANT CHANGE UP
PH UR: 6 — SIGNIFICANT CHANGE UP (ref 5–8)
PHOSPHATE SERPL-MCNC: 4 MG/DL — SIGNIFICANT CHANGE UP (ref 2.5–4.5)
PHOSPHATE SERPL-MCNC: 4.2 MG/DL — SIGNIFICANT CHANGE UP (ref 2.5–4.5)
PHOSPHATE SERPL-MCNC: 4.4 MG/DL — SIGNIFICANT CHANGE UP (ref 2.5–4.5)
PLATELET # BLD AUTO: 20 K/UL — CRITICAL LOW (ref 150–400)
PLATELET # BLD AUTO: 24 K/UL — LOW (ref 150–400)
PLATELET # BLD AUTO: 75 K/UL — LOW (ref 150–400)
POTASSIUM SERPL-MCNC: 3.8 MMOL/L — SIGNIFICANT CHANGE UP (ref 3.5–5.3)
POTASSIUM SERPL-MCNC: 4 MMOL/L — SIGNIFICANT CHANGE UP (ref 3.5–5.3)
POTASSIUM SERPL-MCNC: 4 MMOL/L — SIGNIFICANT CHANGE UP (ref 3.5–5.3)
POTASSIUM SERPL-SCNC: 3.8 MMOL/L — SIGNIFICANT CHANGE UP (ref 3.5–5.3)
POTASSIUM SERPL-SCNC: 4 MMOL/L — SIGNIFICANT CHANGE UP (ref 3.5–5.3)
POTASSIUM SERPL-SCNC: 4 MMOL/L — SIGNIFICANT CHANGE UP (ref 3.5–5.3)
PROT SERPL-MCNC: 4.2 G/DL — LOW (ref 6–8.3)
PROT SERPL-MCNC: 4.7 G/DL — LOW (ref 6–8.3)
PROT SERPL-MCNC: 4.7 G/DL — LOW (ref 6–8.3)
PROT UR-MCNC: 100 MG/DL
PROTHROM AB SERPL-ACNC: 13 SEC — SIGNIFICANT CHANGE UP (ref 10.5–13.4)
PROTHROM AB SERPL-ACNC: 13.2 SEC — SIGNIFICANT CHANGE UP (ref 10.5–13.4)
PROTHROM AB SERPL-ACNC: 13.5 SEC — HIGH (ref 10.5–13.4)
RBC # BLD: 2.87 M/UL — LOW (ref 4.2–5.8)
RBC # BLD: 2.88 M/UL — LOW (ref 4.2–5.8)
RBC # BLD: 3.07 M/UL — LOW (ref 4.2–5.8)
RBC # FLD: 15.8 % — HIGH (ref 10.3–14.5)
RBC # FLD: 15.9 % — HIGH (ref 10.3–14.5)
RBC # FLD: 15.9 % — HIGH (ref 10.3–14.5)
RBC CASTS # UR COMP ASSIST: < 5 /HPF — SIGNIFICANT CHANGE UP
SODIUM SERPL-SCNC: 136 MMOL/L — SIGNIFICANT CHANGE UP (ref 135–145)
SODIUM SERPL-SCNC: 137 MMOL/L — SIGNIFICANT CHANGE UP (ref 135–145)
SODIUM SERPL-SCNC: 137 MMOL/L — SIGNIFICANT CHANGE UP (ref 135–145)
SP GR SPEC: 1.02 — SIGNIFICANT CHANGE UP (ref 1–1.03)
SPECIMEN SOURCE: SIGNIFICANT CHANGE UP
UROBILINOGEN FLD QL: 0.2 E.U./DL — SIGNIFICANT CHANGE UP
WBC # BLD: 10.14 K/UL — SIGNIFICANT CHANGE UP (ref 3.8–10.5)
WBC # BLD: 10.41 K/UL — SIGNIFICANT CHANGE UP (ref 3.8–10.5)
WBC # BLD: 8.12 K/UL — SIGNIFICANT CHANGE UP (ref 3.8–10.5)
WBC # FLD AUTO: 10.14 K/UL — SIGNIFICANT CHANGE UP (ref 3.8–10.5)
WBC # FLD AUTO: 10.41 K/UL — SIGNIFICANT CHANGE UP (ref 3.8–10.5)
WBC # FLD AUTO: 8.12 K/UL — SIGNIFICANT CHANGE UP (ref 3.8–10.5)
WBC UR QL: < 5 /HPF — SIGNIFICANT CHANGE UP

## 2023-03-26 RX ORDER — PIPERACILLIN AND TAZOBACTAM 4; .5 G/20ML; G/20ML
3.38 INJECTION, POWDER, LYOPHILIZED, FOR SOLUTION INTRAVENOUS EVERY 8 HOURS
Refills: 0 | Status: COMPLETED | OUTPATIENT
Start: 2023-03-26 | End: 2023-04-02

## 2023-03-26 RX ORDER — ALBUMIN HUMAN 25 %
50 VIAL (ML) INTRAVENOUS EVERY 8 HOURS
Refills: 0 | Status: COMPLETED | OUTPATIENT
Start: 2023-03-26 | End: 2023-03-26

## 2023-03-26 RX ORDER — PIPERACILLIN AND TAZOBACTAM 4; .5 G/20ML; G/20ML
2.25 INJECTION, POWDER, LYOPHILIZED, FOR SOLUTION INTRAVENOUS EVERY 8 HOURS
Refills: 0 | Status: DISCONTINUED | OUTPATIENT
Start: 2023-03-26 | End: 2023-03-26

## 2023-03-26 RX ORDER — AMIODARONE HYDROCHLORIDE 400 MG/1
TABLET ORAL
Refills: 0 | Status: DISCONTINUED | OUTPATIENT
Start: 2023-03-26 | End: 2023-03-28

## 2023-03-26 RX ORDER — AMIODARONE HYDROCHLORIDE 400 MG/1
400 TABLET ORAL EVERY 8 HOURS
Refills: 0 | Status: DISCONTINUED | OUTPATIENT
Start: 2023-03-26 | End: 2023-03-28

## 2023-03-26 RX ORDER — DEXTROSE 10 % IN WATER 10 %
1000 INTRAVENOUS SOLUTION INTRAVENOUS
Refills: 0 | Status: DISCONTINUED | OUTPATIENT
Start: 2023-03-26 | End: 2023-03-30

## 2023-03-26 RX ADMIN — PIPERACILLIN AND TAZOBACTAM 200 GRAM(S): 4; .5 INJECTION, POWDER, LYOPHILIZED, FOR SOLUTION INTRAVENOUS at 05:33

## 2023-03-26 RX ADMIN — CHLORHEXIDINE GLUCONATE 15 MILLILITER(S): 213 SOLUTION TOPICAL at 05:33

## 2023-03-26 RX ADMIN — Medication 57.5 MILLIGRAM(S): at 06:54

## 2023-03-26 RX ADMIN — FENTANYL CITRATE 25 MICROGRAM(S): 50 INJECTION INTRAVENOUS at 11:15

## 2023-03-26 RX ADMIN — Medication 50 MILLILITER(S): at 09:00

## 2023-03-26 RX ADMIN — FENTANYL CITRATE 25 MICROGRAM(S): 50 INJECTION INTRAVENOUS at 15:17

## 2023-03-26 RX ADMIN — PANTOPRAZOLE SODIUM 40 MILLIGRAM(S): 20 TABLET, DELAYED RELEASE ORAL at 11:14

## 2023-03-26 RX ADMIN — BUMETANIDE 2 MILLIGRAM(S): 0.25 INJECTION INTRAMUSCULAR; INTRAVENOUS at 01:24

## 2023-03-26 RX ADMIN — LACOSAMIDE 120 MILLIGRAM(S): 50 TABLET ORAL at 07:47

## 2023-03-26 RX ADMIN — LACOSAMIDE 120 MILLIGRAM(S): 50 TABLET ORAL at 18:35

## 2023-03-26 RX ADMIN — BUMETANIDE 2 MILLIGRAM(S): 0.25 INJECTION INTRAMUSCULAR; INTRAVENOUS at 09:00

## 2023-03-26 RX ADMIN — AMIODARONE HYDROCHLORIDE 400 MILLIGRAM(S): 400 TABLET ORAL at 22:43

## 2023-03-26 RX ADMIN — FENTANYL CITRATE 25 MICROGRAM(S): 50 INJECTION INTRAVENOUS at 07:40

## 2023-03-26 RX ADMIN — Medication 50 MILLILITER(S): at 01:20

## 2023-03-26 RX ADMIN — FENTANYL CITRATE 25 MICROGRAM(S): 50 INJECTION INTRAVENOUS at 00:35

## 2023-03-26 RX ADMIN — Medication 57.5 MILLIGRAM(S): at 18:35

## 2023-03-26 RX ADMIN — BUMETANIDE 2 MILLIGRAM(S): 0.25 INJECTION INTRAMUSCULAR; INTRAVENOUS at 17:42

## 2023-03-26 RX ADMIN — Medication 50 MILLILITER(S): at 18:35

## 2023-03-26 RX ADMIN — DEXMEDETOMIDINE HYDROCHLORIDE IN 0.9% SODIUM CHLORIDE 7.71 MICROGRAM(S)/KG/HR: 4 INJECTION INTRAVENOUS at 01:10

## 2023-03-26 RX ADMIN — FENTANYL CITRATE 25 MICROGRAM(S): 50 INJECTION INTRAVENOUS at 00:20

## 2023-03-26 RX ADMIN — Medication 20 MILLILITER(S): at 01:25

## 2023-03-26 RX ADMIN — PIPERACILLIN AND TAZOBACTAM 25 GRAM(S): 4; .5 INJECTION, POWDER, LYOPHILIZED, FOR SOLUTION INTRAVENOUS at 22:43

## 2023-03-26 RX ADMIN — CHLORHEXIDINE GLUCONATE 15 MILLILITER(S): 213 SOLUTION TOPICAL at 17:43

## 2023-03-26 RX ADMIN — FENTANYL CITRATE 25 MICROGRAM(S): 50 INJECTION INTRAVENOUS at 07:25

## 2023-03-26 RX ADMIN — PIPERACILLIN AND TAZOBACTAM 200 GRAM(S): 4; .5 INJECTION, POWDER, LYOPHILIZED, FOR SOLUTION INTRAVENOUS at 14:17

## 2023-03-26 RX ADMIN — DEXMEDETOMIDINE HYDROCHLORIDE IN 0.9% SODIUM CHLORIDE 7.71 MICROGRAM(S)/KG/HR: 4 INJECTION INTRAVENOUS at 07:53

## 2023-03-26 RX ADMIN — CHLORHEXIDINE GLUCONATE 1 APPLICATION(S): 213 SOLUTION TOPICAL at 05:33

## 2023-03-26 NOTE — PROGRESS NOTE ADULT - SUBJECTIVE AND OBJECTIVE BOX
INTERVAL COURSE  Remained in sinus, afebrile   Diuresing well with Bumex, still anasarcic- CVP 10-13    CPAPed during the day, good MS and neuro exam   BAL + for serratia   plt 20 s/p 1 transfusion     VITALS  Vital Signs Last 24 Hrs  T(C): 36.8 (26 Mar 2023 21:08), Max: 38 (25 Mar 2023 22:00)  T(F): 98.2 (26 Mar 2023 21:08), Max: 100.4 (25 Mar 2023 22:00)  HR: 83 (26 Mar 2023 21:) (70 - 83)  BP: 136/62  BP(mean): 80  RR: 15 (26 Mar 2023 21:) (13 - 30)  SpO2: 96% (26 Mar 2023 21:) (96% - 100%)    Parameters below as of 26 Mar 2023 21:00  Patient On (Oxygen Delivery Method): ventilator,40%, Vt 650, RR12, PEEP 5    O2 Concentration (%): 40    I&O's Summary  25 Mar 2023 07:01  -  26 Mar 2023 07:00  --------------------------------------------------------  IN: 2577.6 mL / OUT: 5955 mL / NET: -3377.4 mL    26 Mar 2023 07:01  -  26 Mar 2023 21:58  --------------------------------------------------------  IN: 2180.6 mL / OUT: 3200 mL / NET: -1019.4 mL    PHYSICAL EXAM  General: lightly sedated, arousable   Respiratory: CTA B/L; no wheezes  Cardiovascular: Regular rhythm/rate  Gastrointestinal: Soft; NTND   Extremities: Warm, anasarcic but improving   Neurological:  CNII-XII grossly intact; no obvious focal deficits    MEDICATIONS  (STANDING):  aMIOdarone    Tablet   Oral   aMIOdarone    Tablet 400 milliGRAM(s) Oral every 8 hours  aspirin  chewable 81 milliGRAM(s) Enteral Tube daily  buMETAnide Injectable 2 milliGRAM(s) IV Push every 8 hours  chlorhexidine 0.12% Liquid 15 milliLiter(s) Oral Mucosa every 12 hours  chlorhexidine 2% Cloths 1 Application(s) Topical daily  dexMEDEtomidine Infusion 0.4 MICROgram(s)/kG/Hr (7.71 mL/Hr) IV Continuous <Continuous>  dextrose 10%. 1000 milliLiter(s) (20 mL/Hr) IV Continuous <Continuous>  dextrose 5%. 1000 milliLiter(s) (50 mL/Hr) IV Continuous <Continuous>  dextrose 5%. 1000 milliLiter(s) (100 mL/Hr) IV Continuous <Continuous>  dextrose 50% Injectable 50 milliLiter(s) IV Push every 15 minutes  dextrose 50% Injectable 25 milliLiter(s) IV Push every 15 minutes  glucagon  Injectable 1 milliGRAM(s) IntraMuscular once  insulin lispro (ADMELOG) corrective regimen sliding scale   SubCutaneous every 6 hours  lacosamide IVPB 100 milliGRAM(s) IV Intermittent every 12 hours  pantoprazole  Injectable 40 milliGRAM(s) IV Push daily  phenylephrine    Infusion 0.2 MICROgram(s)/kG/Min (5.78 mL/Hr) IV Continuous <Continuous>  piperacillin/tazobactam IVPB.. 3.375 Gram(s) IV Intermittent every 8 hours  propofol Infusion 10 MICROgram(s)/kG/Min (4.63 mL/Hr) IV Continuous <Continuous>  sodium chloride 0.9%. 1000 milliLiter(s) (10 mL/Hr) IV Continuous <Continuous>  valproate sodium  IVPB 750 milliGRAM(s) IV Intermittent every 12 hours    MEDICATIONS  (PRN):  dextrose Oral Gel 15 Gram(s) Oral once PRN Blood Glucose LESS THAN 70 milliGRAM(s)/deciliter  fentaNYL    Injectable 25 MICROGram(s) IV Push every 3 hours PRN Severe Pain (7 - 10)      LABS                        8.8    10.41 )-----------( 75       ( 26 Mar 2023 18:01 )             25.5         137  |  103  |  76<H>  ----------------------------<  110<H>  4.0   |  23  |  3.97<H>    Ca    8.6      26 Mar 2023 18:01  Phos  4.2     03-  Mg     2.2     -    TPro  4.7<L>  /  Alb  2.5<L>  /  TBili  0.9  /  DBili  x   /  AST  34  /  ALT  10  /  AlkPhos  74  03-26    LIVER FUNCTIONS - ( 26 Mar 2023 18:01 )  Alb: 2.5 g/dL / Pro: 4.7 g/dL / ALK PHOS: 74 U/L / ALT: 10 U/L / AST: 34 U/L / GGT: x           PT/INR - ( 26 Mar 2023 18:01 )   PT: 13.5 sec;   INR: 1.13          PTT - ( 26 Mar 2023 18:01 )  PTT:29.4 sec  Urinalysis Basic - ( 25 Mar 2023 23:36 )    Color: Yellow / Appearance: Clear / S.025 / pH: x  Gluc: x / Ketone: NEGATIVE  / Bili: Negative / Urobili: 0.2 E.U./dL   Blood: x / Protein: 100 mg/dL / Nitrite: NEGATIVE   Leuk Esterase: NEGATIVE / RBC: < 5 /HPF / WBC < 5 /HPF   Sq Epi: x / Non Sq Epi: 0-5 /HPF / Bacteria: Present /HPF      IMAGING/EKG/ETC  Reviewed.

## 2023-03-26 NOTE — PROGRESS NOTE ADULT - ASSESSMENT
54 yo M w/ HTN, seizure disorder and no underlying CKD who is s/p scheduled replacement of aortic arch and TEVAR on 3/20. Prolonger surgery with significant amount of blood products and fluids recieved intra-op. Post-op pt intubated, on pressors, with severe acidosis. Started on CVVHD 3/21 for refractory acidosis, and was then continued for vol removal, tolerating CVVHD without any complications. Pressor and oxygen requirement decreasing, and remains non-oliguric 3/24          #Non-oliguric iATN due to intra-op hemodynamic changes (pt requiring 7 units pRBC intra-op and post-op pt in shock. Shock now improved)  BCr 1.2, eGFR 67 (3/9)  UA w/ trace proteinuria and large blood w/o RBCs. [id number] (3/21)  has been off of CVVHD since 3/24- diuresing well on  bumex 2q8; uop 5L/24 hours  given rising CR and enaring euvolemia would DC diuretics  will assess need for HD on 3/27 given rising Cr- though stable electrolytes and volume status  Met acidosis resolved. Lactate normal now  Strict I&Os  BMP per icu protocol      Plan discussed with CTICU team             54 yo M w/ HTN, seizure disorder and no underlying CKD who is s/p scheduled replacement of aortic arch and TEVAR on 3/20. Prolonger surgery with significant amount of blood products and fluids recieved intra-op. Post-op pt intubated, on pressors, with severe acidosis. Started on CVVHD 3/21 for refractory acidosis, and was then continued for vol removal, tolerating CVVHD without any complications. Pressor and oxygen requirement decreasing, and remains non-oliguric 3/24          #Non-oliguric iATN due to intra-op hemodynamic changes (pt requiring 7 units pRBC intra-op and post-op pt in shock. Shock now improved)  BCr 1.2, eGFR 67 (3/9)  UA w/ trace proteinuria and large blood w/o RBCs. [id number] (3/21)  has been off of CVVHD since 3/24- diuresing well on  bumex 2q8; uop 5L/24 hours  given rising CR and clinical  euvolemia would DC diuretics  will assess need for HD on 3/27 given rising Cr- though stable electrolytes and volume status  Met acidosis resolved. Lactate normal now  Strict I&Os  BMP per icu protocol      Plan discussed with CTICU team

## 2023-03-26 NOTE — CHART NOTE - NSCHARTNOTEFT_GEN_A_CORE
Admitting Diagnosis:   Patient is a 53y old  Male who presents with a chief complaint of expanding aortic arch and descending aortic aneurysm (26 Mar 2023 09:39)      PAST MEDICAL & SURGICAL HISTORY:  HTN (hypertension)      Aortic dissection      CAD (coronary artery disease)      Seizure disorder      S/P aortic bifurcation bypass graft      S/P CABG x 1      Current Nutrition Order:  Nepro @ 45ml/hr x 24hrs via NG. Provides: 1080ml TV, 1944kcal, 87.5g pro, 785ml free H2O, 1.1g/kg ABW protein.     PO Intake: Good (%) [   ]  Fair (50-75%) [   ] Poor (<25%) [   ]- NA NPO w/EN    GI Issues: Unable to assess at this time 2/2 vent; no abdominal distention or emesis per RN  Profuse watery diarrhea >> was previously receiving reglan, which has now been held for ~36 hours     Pain: Unable to assess at this time 2/2 vent; nonverbal indicators of pain absent     Skin Integrity: Sandro 12, generalized mild edema, L. arm 3+, scrotum 4+ edema  R. groin wound, L. leg wound, MSI  B/L pleural tubes x 2, Blakes x 3    Labs:   03-26    136  |  104  |  70<H>  ----------------------------<  108<H>  3.8   |  22  |  3.85<H>    Ca    8.4      26 Mar 2023 10:47  Phos  4.4     03-26  Mg     2.3     03-26    TPro  4.7<L>  /  Alb  2.0<L>  /  TBili  0.7  /  DBili  x   /  AST  39  /  ALT  10  /  AlkPhos  79  03-26    CAPILLARY BLOOD GLUCOSE      POCT Blood Glucose.: 96 mg/dL (26 Mar 2023 10:59)  POCT Blood Glucose.: 92 mg/dL (26 Mar 2023 05:42)  POCT Blood Glucose.: 80 mg/dL (26 Mar 2023 00:35)      Medications:  MEDICATIONS  (STANDING):  albumin human 25% IVPB 50 milliLiter(s) IV Intermittent every 8 hours  aMIOdarone Infusion 0.5 mG/Min (16.7 mL/Hr) IV Continuous <Continuous>  aspirin  chewable 81 milliGRAM(s) Enteral Tube daily  buMETAnide Injectable 2 milliGRAM(s) IV Push every 8 hours  chlorhexidine 0.12% Liquid 15 milliLiter(s) Oral Mucosa every 12 hours  chlorhexidine 2% Cloths 1 Application(s) Topical daily  dexMEDEtomidine Infusion 0.4 MICROgram(s)/kG/Hr (7.71 mL/Hr) IV Continuous <Continuous>  dextrose 10%. 1000 milliLiter(s) (20 mL/Hr) IV Continuous <Continuous>  dextrose 5%. 1000 milliLiter(s) (50 mL/Hr) IV Continuous <Continuous>  dextrose 5%. 1000 milliLiter(s) (100 mL/Hr) IV Continuous <Continuous>  dextrose 50% Injectable 50 milliLiter(s) IV Push every 15 minutes  dextrose 50% Injectable 25 milliLiter(s) IV Push every 15 minutes  glucagon  Injectable 1 milliGRAM(s) IntraMuscular once  insulin lispro (ADMELOG) corrective regimen sliding scale   SubCutaneous every 6 hours  lacosamide IVPB 100 milliGRAM(s) IV Intermittent every 12 hours  pantoprazole  Injectable 40 milliGRAM(s) IV Push daily  phenylephrine    Infusion 0.2 MICROgram(s)/kG/Min (5.78 mL/Hr) IV Continuous <Continuous>  piperacillin/tazobactam IVPB.. 2.25 Gram(s) IV Intermittent every 8 hours  propofol Infusion 10 MICROgram(s)/kG/Min (4.63 mL/Hr) IV Continuous <Continuous>  sodium chloride 0.9%. 1000 milliLiter(s) (10 mL/Hr) IV Continuous <Continuous>  valproate sodium  IVPB 750 milliGRAM(s) IV Intermittent every 12 hours    MEDICATIONS  (PRN):  dextrose Oral Gel 15 Gram(s) Oral once PRN Blood Glucose LESS THAN 70 milliGRAM(s)/deciliter  fentaNYL    Injectable 25 MICROGram(s) IV Push every 3 hours PRN Severe Pain (7 - 10)      Weight: 77.1kg  Weight Change: no new wts recorded     Nutrition Focused Physical Exam: Completed [   ]  Not Pertinent [ X  ]    Estimated energy needs: Height 72"; ABW 77.1kg; IBW 81kg; 95%IBW; BMI 23  IBW used to estimate needs based on clinical judgement/anasarca. Needs estimated for age and adjusted for vent demands, post-op/wound healing. Moderate protein 2/2 TREY. Fluids per team     Estimated Energy Needs Weight (lbs)	178 lb  Estimated Energy Needs Weight (kg)	80.7 kg  Estimated Energy Needs From (ifeanyi/kg)	25   Estimated Energy Needs To (ifeanyi/kg)	30   Estimated Energy Needs Calculated From (ifeanyi/kg)	2017   Estimated Energy Needs Calculated To (ifeanyi/kg)	2421     Estimated Protein Needs Weight (lbs)	178 lb  Estimated Protein Needs Weight (kg)	80.7 kg  Estimated Protein Needs From (g/kg)	1.2  Estimated Protein Needs To (g/kg)	1.4  Estimated Protein Needs Calculated From (g/kg)	97.2  Estimated Protein Needs Calculated To (g/kg)	113    Subjective:   Pt is a 53yr old male with PMH HTN, type A dissection sp Dacron grafts and AV resuspension (2013), CAD sp CABG x 1 ([facility], 5/2013, SVG-RCA), seizures, admitted for surgical mgmt of progression of aneurysmal disease. Patient underwent replacement of transverse aortic arch, second stage thoracic endovascular aortic repair, aorto-axillary bypass, AV replacement (bio 23mm), and CABG x 1 (SVG-RCA) EF 75% with Dr. Pierre 3/20. Patient received 7 units pRBC, 6 FFP, 4 plt, 15 cryo, 1000 FEIBA, and uo 1300cc. Started on lasix infusion and phenylephrine, bicarb, Armaan, levo and vaso, with lactic acidosis. 3/21 L femoral HD cath placed and CVVHD started with improvement in acidosis. Pressors off by end of day. Primacor weaned overnight. Patient woke up and follows commands, exhibited facial twitching cw prior seizures per wife and ativan given followed by vEEG placement. Neurology consulted for recs given subtherapeutic AED levels. Given 2 units pRBC. 3/22 Neurology recommended dc vEEG given low suspicion for seizures. Fluid removal initiated with CVVHD. Overnight poor oxygenation requiring bronchoscopy, on laquita and vaso, D10 started for hypoglycemia. 3/23 large residuals, reglan started. CTH performed for poor mentation-no bleed. Amio given for afib. Received 1 unit pRBC and 1 plt, femoral a line removed. CVVHD stopped and diuretic initiated with good response. Primacor turned off. 3/25 new TLC and R cordis with swan placed, tolerated cpap. D10 for hypoglycemia. Underwent aquaphoresis via new [facility] HD cath. Pt seen in room, remains intubated on CPAP mode, sedated on precedex. Per RN has been tracking and following commands. Left pt to rest at time of visit. MAP 78- on laquita for BP support. EN running at goal of 45ml/hr w/no further episodes of elevated residuals but now w/profuse diarrhea (s/p reglan, off x 36hrs). Noted on D10 @ 20cc/hr. Nitric oxide remains on. Lytes WNL, BUN/Cr trending up. Afebrile. Will continue to follow per RD protocol.     Previous Nutrition Diagnosis:  Inadequate Energy Intake r/t inability to meet EER on current diet AEB NPO status    Active [   ]  Resolved [ X  ]    If resolved, new PES: Inadequate oral intake RT current NPO status 2/2 intubation AEB EN dependent     Goal: Pt will consistently meet % of daily EER via tolerated route     Recommendations:  1. Consider changing feeds to Vital 1.5 Ifeanyi @ 60ml/hr x 24hrs plus 1 Liquid Protein Supplement (100kcal, 15g pro) via NG. Provides: 1440ml TV, 2260kcal, 112g pro, 1.38g/kg IBW protein, 1100ml free H2O. Monitor for s/s intolerance; maintain aspiration precautions at all times   2. Recommend banatrol TID for diarrhea  3. Recommend MVI w/minerals   4. Monitor lytes and replete prn. POC BG q6hrs   5. Pain regimen per team     RD to remain available for further recommendations as needed    Education: N/A    Risk Level: High [  X ] Moderate [   ] Low [   ]

## 2023-03-26 NOTE — PROGRESS NOTE ADULT - SUBJECTIVE AND OBJECTIVE BOX
CTICU  CRITICAL  CARE  attending     Hand off received 					   Pertinent clinical, laboratory, radiographic, hemodynamic, echocardiographic, respiratory data, microbiologic data and chart were reviewed and analyzed frequently throughout the course of the day  Patient seen and examined with CTS/ SH attending at bedside  Pt is a 53yr old male with PMH HTN, type A dissection sp Dacron grafts and AV resuspension (2013), CAD sp CABG x 1 ([facility], 5/2013, SVG-RCA), seizures, admitted for surgical mgmt of progression of aneurysmal disease. Patient underwent replacement of transverse aortic arch, second stage thoracic endovascular aortic repair, aorto-axillary bypass, AV replacement (bio 23mm), and CABG x 1 (SVG-RCA) EF 75% with Dr. Pierre 3/20. Patient received 7 units pRBC, 6 FFP, 4 plt, 15 cryo, 1000 FEIBA, and uo 1300cc. Started on lasix infusion and phenylephrine, bicarb, Armaan, levo and vaso, with lactic acidosis. 3/21 L femoral HD cath placed and CVVHD started with improvement in acidosis. Pressors off by end of day. Primacor weaned overnight. Patient woke up and follows commands, exhibited facial twitching cw prior seizures per wife and ativan given followed by vEEG placement. Neurology consulted for recs given subtherapeutic AED levels. Given 2 units pRBC. 3/22 Neurology recommended dc vEEG given low suspicion for seizures. Fluid removal initiated with CVVHD. Overnight poor oxygenation requiring bronchoscopy, on laquita and vaso, D10 started for hypoglycemia. 3/23 large residuals, reglan started. CTH performed for poor mentation-no bleed. Amio given for afib. Received 1 unit pRBC and 1 plt, femoral a line removed. CVVHD stopped and diuretic initiated with good response. Primacor turned off. 3/25 new TLC and R cordis with swan placed, tolerated cpap. D10 for hypoglycemia. Underwent aquaphoresis via new LIJ HD cath. Low grade fevers. Overnight swan dc'd.     FAMILY HISTORY:  No pertinent family history in first degree relatives  PAST MEDICAL & SURGICAL HISTORY:  HTN (hypertension)  Aortic dissection  CAD (coronary artery disease)  Seizure disorder  S/P aortic bifurcation bypass graft  S/P CABG x 1        Patient is a 53y old  Male who presents with a chief complaint of expanding aortic arch and descending aortic aneurysm.      14 system review was unremarkable    Vital signs, hemodynamic and respiratory parameters were reviewed from the bedside nursing flowsheet.  ICU Vital Signs Last 24 Hrs  T(C): 36.7 (26 Mar 2023 05:27), Max: 38.2 (25 Mar 2023 10:00)  T(F): 98 (26 Mar 2023 05:27), Max: 100.8 (25 Mar 2023 10:00)  HR: 71 (26 Mar 2023 09:00) (67 - 93)  BP: --  BP(mean): --  ABP: 140/65 (26 Mar 2023 09:00) (88/43 - 173/75)  ABP(mean): 85 (26 Mar 2023 09:00) (56 - 97)  RR: 18 (26 Mar 2023 08:27) (12 - 25)  SpO2: 100% (26 Mar 2023 09:00) (96% - 100%)    O2 Parameters below as of 26 Mar 2023 09:00  Patient On (Oxygen Delivery Method): ventilator    O2 Concentration (%): 40      Adult Advanced Hemodynamics Last 24 Hrs  CVP(mm Hg): 13 (26 Mar 2023 09:00) (10 - 21)  CVP(cm H2O): --  CO: 5 (25 Mar 2023 23:00) (5 - 5.1)  CI: 2.5 (25 Mar 2023 23:00) (2.5 - 2.6)  PA: -1/-1 (26 Mar 2023 09:00) (-1/-1 - 39/29)  PA(mean): -1 (26 Mar 2023 09:00) (-1 - 34)  PCWP: --  SVR: 991 (25 Mar 2023 23:00) (991 - 1207)  SVRI: 1970 (25 Mar 2023 23:00) (1970 - 2399)  PVR: --  PVRI: --, ABG - ( 26 Mar 2023 02:06 )  pH, Arterial: 7.45  pH, Blood: x     /  pCO2: 32    /  pO2: 142   / HCO3: 22    / Base Excess: -1.0  /  SaO2: 99.5              Mode: AC/ CMV (Assist Control/ Continuous Mandatory Ventilation)  RR (machine): 12  TV (machine): 650  FiO2: 40  PEEP: 8  ITime: 0.1  MAP: 13  PIP: 25    Intake and output was reviewed and the fluid balance was calculated  Daily     Daily   I&O's Summary    25 Mar 2023 07:01  -  26 Mar 2023 07:00  --------------------------------------------------------  IN: 2577.6 mL / OUT: 5955 mL / NET: -3377.4 mL    26 Mar 2023 07:01  -  26 Mar 2023 09:39  --------------------------------------------------------  IN: 144.1 mL / OUT: 425 mL / NET: -280.9 mL        All lines and drain sites were assessed  Glycemic trend was reviewed      POCT Blood Glucose.: 92 mg/dL (26 Mar 2023 05:42)      Neuro: follows commands  HEENT: ett  Heart: s1 s2   Lungs: clear bl  Abdomen: soft nt nd  Extremities: 2+dp    Lines:  RIJ Cordis with 3/25  RIJ TLC 3/25  LIJ HD cath 3/25  R radial arterial line 3/20    Tubes:  Med bella x 3  Pleural bella x 2      labs  CBC Full  -  ( 26 Mar 2023 02:06 )  WBC Count : 8.12 K/uL  RBC Count : 2.88 M/uL  Hemoglobin : 8.8 g/dL  Hematocrit : 25.5 %  Platelet Count - Automated : 24 K/uL  Mean Cell Volume : 88.5 fl  Mean Cell Hemoglobin : 30.6 pg  Mean Cell Hemoglobin Concentration : 34.5 gm/dL  Auto Neutrophil # : x  Auto Lymphocyte # : x  Auto Monocyte # : x  Auto Eosinophil # : x  Auto Basophil # : x  Auto Neutrophil % : x  Auto Lymphocyte % : x  Auto Monocyte % : x  Auto Eosinophil % : x  Auto Basophil % : x    03-26    137  |  104  |  63<H>  ----------------------------<  99  4.0   |  22  |  3.69<H>    Ca    8.1<L>      26 Mar 2023 02:06  Phos  4.0     03-26  Mg     2.3     03-26    TPro  4.2<L>  /  Alb  1.9<L>  /  TBili  0.7  /  DBili  x   /  AST  42<H>  /  ALT  9<L>  /  AlkPhos  70  03-26    PT/INR - ( 26 Mar 2023 02:06 )   PT: 13.0 sec;   INR: 1.09          PTT - ( 26 Mar 2023 02:06 )  PTT:31.9 sec  The current medications were reviewed   MEDICATIONS  (STANDING):  albumin human 25% IVPB 50 milliLiter(s) IV Intermittent every 8 hours  aMIOdarone Infusion 0.5 mG/Min (16.7 mL/Hr) IV Continuous <Continuous>  aspirin  chewable 81 milliGRAM(s) Enteral Tube daily  buMETAnide Injectable 2 milliGRAM(s) IV Push every 8 hours  chlorhexidine 0.12% Liquid 15 milliLiter(s) Oral Mucosa every 12 hours  chlorhexidine 2% Cloths 1 Application(s) Topical daily  dexMEDEtomidine Infusion 0.4 MICROgram(s)/kG/Hr (7.71 mL/Hr) IV Continuous <Continuous>  dextrose 10%. 1000 milliLiter(s) (20 mL/Hr) IV Continuous <Continuous>  dextrose 5%. 1000 milliLiter(s) (50 mL/Hr) IV Continuous <Continuous>  dextrose 5%. 1000 milliLiter(s) (100 mL/Hr) IV Continuous <Continuous>  dextrose 50% Injectable 50 milliLiter(s) IV Push every 15 minutes  dextrose 50% Injectable 25 milliLiter(s) IV Push every 15 minutes  glucagon  Injectable 1 milliGRAM(s) IntraMuscular once  insulin lispro (ADMELOG) corrective regimen sliding scale   SubCutaneous every 6 hours  lacosamide IVPB 100 milliGRAM(s) IV Intermittent every 12 hours  pantoprazole  Injectable 40 milliGRAM(s) IV Push daily  phenylephrine    Infusion 0.2 MICROgram(s)/kG/Min (5.78 mL/Hr) IV Continuous <Continuous>  piperacillin/tazobactam IVPB.. 2.25 Gram(s) IV Intermittent every 8 hours  propofol Infusion 10 MICROgram(s)/kG/Min (4.63 mL/Hr) IV Continuous <Continuous>  sodium chloride 0.9%. 1000 milliLiter(s) (10 mL/Hr) IV Continuous <Continuous>  valproate sodium  IVPB 750 milliGRAM(s) IV Intermittent every 12 hours    MEDICATIONS  (PRN):  dextrose Oral Gel 15 Gram(s) Oral once PRN Blood Glucose LESS THAN 70 milliGRAM(s)/deciliter  fentaNYL    Injectable 25 MICROGram(s) IV Push every 3 hours PRN Severe Pain (7 - 10)      Assessment/Plan:  53yr old male with PMH HTN, type A dissection sp Dacron grafts and AV resuspension (2013), CAD sp CABG x 1 ([facility], 5/2013, SVG-RCA), seizures, admitted for surgical mgmt of progression of aneurysmal disease.     POD6 replacement of transverse aortic arch, second stage thoracic endovascular aortic repair, aorto-axillary bypass, AV replacement (bio 23mm), and CABG x 1 (SVG-RCA) (EF 75%, [place], 3/20)  RASS goal 0/-1, wean sedation to assess neuro status  Acute respiratory failure requiring mechanical ventilation-cpap trials  Continue diuresis, bumex 2q8  On Armaan-wean   Febrile-on zosyn, follow cultures  Continue AEDs  Acute post operative anemia-trend H/H, 1 unit prbc now  Thrombocytopenia-monitor  HIT neg, follow NATALYA  Mild LFT elevation-trend  CK downtrending  TREY now  Replete lytes prn  Monitor CT output  Tube feeds, increase to goal as tolerated  GI/DVT PPX  Bowel Regimen  Pain control  Close hemodynamic, ventilatory and drain monitoring and management per post op routine  Monitor for arrhythmias and monitor parameters for organ perfusion  Beta blockade not administered due to hemodynamic instability and bradycardia  Monitor neurologic status  Head of the bed should remain elevated to 45 deg   Chest PT and IS will be encouraged  Monitor adequacy of oxygenation and ventilation and attempt to wean oxygen  Antibiotic regimen will be tailored to the clinical, laboratory and microbiologic data  Nutritional goals will be met using po eventually, ensure adequate caloric intake and monitor the same  Stress ulcer and VTE prophylaxis will be achieved    Glycemic control is satisfactory  Electrolytes have been repleted as necessary and wound care has been carried out   Pain control has been achieved.   Aggressive physical therapy and early mobility and ambulation goals will be met   The family was updated about the course and plan.    CRITICAL CARE TIME personally provided by me  in evaluation and management, reassessments, review and interpretation of labs and x-rays, ventilator and hemodynamic management, formulating a plan and coordinating care: ___105____ MIN.  Time does not include procedural time.    CTICU ATTENDING     					  Alexx Brooks MD

## 2023-03-26 NOTE — PROGRESS NOTE ADULT - SUBJECTIVE AND OBJECTIVE BOX
Patient is a 53y Male seen and evaluated at bedside. patient remain intuabted sedated on bumex 2q8 started yesterday Scr up to 3.8 K 3.8 bicarb 22- patient has been of CVVHD since 3/24      Meds:    albumin human 25% IVPB 50 every 8 hours  aMIOdarone Infusion 0.5 <Continuous>  aspirin  chewable 81 daily  buMETAnide Injectable 2 every 8 hours  chlorhexidine 0.12% Liquid 15 every 12 hours  chlorhexidine 2% Cloths 1 daily  dexMEDEtomidine Infusion 0.4 <Continuous>  dextrose 10%. 1000 <Continuous>  dextrose 5%. 1000 <Continuous>  dextrose 5%. 1000 <Continuous>  dextrose 50% Injectable 50 every 15 minutes  dextrose 50% Injectable 25 every 15 minutes  dextrose Oral Gel 15 once PRN  fentaNYL    Injectable 25 every 3 hours PRN  glucagon  Injectable 1 once  insulin lispro (ADMELOG) corrective regimen sliding scale  every 6 hours  lacosamide IVPB 100 every 12 hours  pantoprazole  Injectable 40 daily  phenylephrine    Infusion 0.2 <Continuous>  piperacillin/tazobactam IVPB.. 2.25 every 8 hours  propofol Infusion 10 <Continuous>  sodium chloride 0.9%. 1000 <Continuous>  valproate sodium  IVPB 750 every 12 hours      T(C): , Max: 38 (23 @ 22:00)  T(F): , Max: 100.4 (23 @ 22:00)  HR: 74 (23 @ 14:00)  BP: --  BP(mean): --  RR: 30 (23 @ 14:00)  SpO2: 98% (23 @ 14:00)  Wt(kg): --     @ 07:01  -   @ 07:00  --------------------------------------------------------  IN: 2577.6 mL / OUT: 5955 mL / NET: -3377.4 mL     @ 07:01  -   @ 14:18  --------------------------------------------------------  IN: 655.2 mL / OUT: 1000 mL / NET: -344.8 mL          Review of Systems:  unable to participate     PHYSICAL EXAM:  GENERAL: intubated; sedated   CHEST/LUNG:decreased breath sounds   HEART: normal S1S2, RRR  ABDOMEN: Soft, Nontender, +BS,   EXTREMITIES: trace edema BL LE        LABS:                        9.5    10.14 )-----------( 20       ( 26 Mar 2023 10:47 )             27.1     03-    136  |  104  |  70<H>  ----------------------------<  108<H>  3.8   |  22  |  3.85<H>    Ca    8.4      26 Mar 2023 10:47  Phos  4.4     -  Mg     2.3     -    TPro  4.7<L>  /  Alb  2.0<L>  /  TBili  0.7  /  DBili  x   /  AST  39  /  ALT  10  /  AlkPhos  79  03-26      PT/INR - ( 26 Mar 2023 10:47 )   PT: 13.2 sec;   INR: 1.11          PTT - ( 26 Mar 2023 10:47 )  PTT:30.3 sec  Urinalysis Basic - ( 25 Mar 2023 23:36 )    Color: Yellow / Appearance: Clear / S.025 / pH: x  Gluc: x / Ketone: NEGATIVE  / Bili: Negative / Urobili: 0.2 E.U./dL   Blood: x / Protein: 100 mg/dL / Nitrite: NEGATIVE   Leuk Esterase: NEGATIVE / RBC: < 5 /HPF / WBC < 5 /HPF   Sq Epi: x / Non Sq Epi: 0-5 /HPF / Bacteria: Present /HPF            RADIOLOGY & ADDITIONAL STUDIES:           Patient is a 53y Male seen and evaluated at bedside. patient remain intubated sedated on bumex 2q8 started yesterday Scr up to 3.8 K 3.8 bicarb 22- patient has been of CVVHD since 3/24  PA pressures 20s/10s  per RN urine output seems BP dependent -- good if > 120s systolic      Meds:    albumin human 25% IVPB 50 every 8 hours  aMIOdarone Infusion 0.5 <Continuous>  aspirin  chewable 81 daily  buMETAnide Injectable 2 every 8 hours  chlorhexidine 0.12% Liquid 15 every 12 hours  chlorhexidine 2% Cloths 1 daily  dexMEDEtomidine Infusion 0.4 <Continuous>  dextrose 10%. 1000 <Continuous>  dextrose 5%. 1000 <Continuous>  dextrose 5%. 1000 <Continuous>  dextrose 50% Injectable 50 every 15 minutes  dextrose 50% Injectable 25 every 15 minutes  dextrose Oral Gel 15 once PRN  fentaNYL    Injectable 25 every 3 hours PRN  glucagon  Injectable 1 once  insulin lispro (ADMELOG) corrective regimen sliding scale  every 6 hours  lacosamide IVPB 100 every 12 hours  pantoprazole  Injectable 40 daily  phenylephrine    Infusion 0.2 <Continuous>  piperacillin/tazobactam IVPB.. 2.25 every 8 hours  propofol Infusion 10 <Continuous>  sodium chloride 0.9%. 1000 <Continuous>  valproate sodium  IVPB 750 every 12 hours      T(C): , Max: 38 (23 @ 22:00)  T(F): , Max: 100.4 (23 @ 22:00)  HR: 74 (23 @ 14:00)  BP: -- 128/64  BP(mean): --  RR: 30 (23 @ 14:00)  SpO2: 98% (23 @ 14:00)  Wt(kg): --     @ 07:01  -   @ 07:00  --------------------------------------------------------  IN: 2577.6 mL / OUT: 5955 mL / NET: -3377.4 mL     @ 07:01  -   @ 14:18  --------------------------------------------------------  IN: 655.2 mL / OUT: 1000 mL / NET: -344.8 mL          Review of Systems:  unable to participate     PHYSICAL EXAM:  GENERAL: intubated; sedated   CHEST/LUNG:decreased breath sounds   HEART: normal S1S2, RRR  ABDOMEN: Soft, Nontender, +BS,   EXTREMITIES: minimal edema BL LE        LABS:                        9.5    10.14 )-----------(        ( 26 Mar 2023 10:47 )             27.1         136  |  104  |  70<H>  ----------------------------<  108<H>  3.8   |  22  |  3.85<H>    Ca    8.4      26 Mar 2023 10:47  Phos  4.4       Mg     2.3         TPro  4.7<L>  /  Alb  2.0<L>  /  TBili  0.7  /  DBili  x   /  AST  39  /  ALT  10  /  AlkPhos  79  -26      PT/INR - ( 26 Mar 2023 10:47 )   PT: 13.2 sec;   INR: 1.11          PTT - ( 26 Mar 2023 10:47 )  PTT:30.3 sec  Urinalysis Basic - ( 25 Mar 2023 23:36 )    Color: Yellow / Appearance: Clear / S.025 / pH: x  Gluc: x / Ketone: NEGATIVE  / Bili: Negative / Urobili: 0.2 E.U./dL   Blood: x / Protein: 100 mg/dL / Nitrite: NEGATIVE   Leuk Esterase: NEGATIVE / RBC: < 5 /HPF / WBC < 5 /HPF   Sq Epi: x / Non Sq Epi: 0-5 /HPF / Bacteria: Present /HPF            RADIOLOGY & ADDITIONAL STUDIES:

## 2023-03-26 NOTE — PROGRESS NOTE ADULT - ASSESSMENT
52 year old male, current every day marijuana use w/pmh of HTN, type A aortic dissection s/p Dacron grafts, AV resuspension in 2013,CAD s/p CABG x 1 SVG to RCA (5/2013 with Dr. Medina), seizure disorder (last episode on 7/4/22) presents for a follow up visit for evaluation and management of his aortic pathology and deemed a candidate for a reop total arch replacement given aortic aneurysm and chronic dissection.  S/p AVR/ Arch replacement/ aorto-axillary bypass- CABG x 1 and TEVAR   3/20  Postop course with worsening renal failure and sig acidosis started on CVVHD  Afib 3/23  A/p   - MS and neuro exam intact- continue AEDs per epilepsy recs   - afib converted to sinus- on po amio load   - Off inotrops/ pressors- perfusion parameters remained normal with brisk diuretic response   - Changing nepro to Vital for persistent diarrhea- continue D10 to avoid hypoglycemia   - Renal failure most likely iATN given long operation and clamp time/ required CVVHD for acidosis though non oliguric renal injury--> Currently good UOP on standing bumex ~ 2-3 L FB daily for sig anasarca and extubation planning   BUN/Cr increased but plateauing now  no urgent need for dialysis as lytes and bicarb in normal range and hypervolemia responding to diuretic regimen   If BUN/Cr continue to rise also given profound thrombocytopenia will benefit from CVVHD to optimize clearance   - H&H stable- thrombocytopenic plt ~ 20/ HIT x 3 neg/received 1 Plt transfusion today   - Fevers down grading- Continue zosyn for serratia VAP-follow susceptibilities to adjust Abx regimen   New double stick Central line and swan RIJ 3/25  LIJ HD cath 3/25  Old lines and fem catheters removed     CPAP in am     ATTENDING: I have personally and independently provided 30 Min of critical care services. this excludes time spent on sperate procedures or teaching.

## 2023-03-27 ENCOUNTER — NON-APPOINTMENT (OUTPATIENT)
Age: 53
End: 2023-03-27

## 2023-03-27 LAB
-  AMPICILLIN/SULBACTAM: SIGNIFICANT CHANGE UP
-  AMPICILLIN: SIGNIFICANT CHANGE UP
-  CEFAZOLIN: SIGNIFICANT CHANGE UP
-  CEFEPIME: SIGNIFICANT CHANGE UP
-  CEFOXITIN: SIGNIFICANT CHANGE UP
-  CEFTRIAXONE: SIGNIFICANT CHANGE UP
-  CIPROFLOXACIN: SIGNIFICANT CHANGE UP
-  ERTAPENEM: SIGNIFICANT CHANGE UP
-  GENTAMICIN: SIGNIFICANT CHANGE UP
-  PIPERACILLIN/TAZOBACTAM: SIGNIFICANT CHANGE UP
-  TOBRAMYCIN: SIGNIFICANT CHANGE UP
-  TRIMETHOPRIM/SULFAMETHOXAZOLE: SIGNIFICANT CHANGE UP
ALBUMIN SERPL ELPH-MCNC: 1.9 G/DL — LOW (ref 3.3–5)
ALBUMIN SERPL ELPH-MCNC: 2.4 G/DL — LOW (ref 3.3–5)
ALBUMIN SERPL ELPH-MCNC: 2.5 G/DL — LOW (ref 3.3–5)
ALBUMIN SERPL ELPH-MCNC: 2.5 G/DL — LOW (ref 3.3–5)
ALP SERPL-CCNC: 62 U/L — SIGNIFICANT CHANGE UP (ref 40–120)
ALP SERPL-CCNC: 62 U/L — SIGNIFICANT CHANGE UP (ref 40–120)
ALP SERPL-CCNC: 67 U/L — SIGNIFICANT CHANGE UP (ref 40–120)
ALP SERPL-CCNC: 74 U/L — SIGNIFICANT CHANGE UP (ref 40–120)
ALT FLD-CCNC: 10 U/L — SIGNIFICANT CHANGE UP (ref 10–45)
ALT FLD-CCNC: 8 U/L — LOW (ref 10–45)
ALT FLD-CCNC: 9 U/L — LOW (ref 10–45)
ALT FLD-CCNC: 9 U/L — LOW (ref 10–45)
ANION GAP SERPL CALC-SCNC: 10 MMOL/L — SIGNIFICANT CHANGE UP (ref 5–17)
ANION GAP SERPL CALC-SCNC: 12 MMOL/L — SIGNIFICANT CHANGE UP (ref 5–17)
ANION GAP SERPL CALC-SCNC: 13 MMOL/L — SIGNIFICANT CHANGE UP (ref 5–17)
ANION GAP SERPL CALC-SCNC: 13 MMOL/L — SIGNIFICANT CHANGE UP (ref 5–17)
APTT BLD: 29.1 SEC — SIGNIFICANT CHANGE UP (ref 27.5–35.5)
APTT BLD: 29.7 SEC — SIGNIFICANT CHANGE UP (ref 27.5–35.5)
APTT BLD: 30.1 SEC — SIGNIFICANT CHANGE UP (ref 27.5–35.5)
APTT BLD: 30.3 SEC — SIGNIFICANT CHANGE UP (ref 27.5–35.5)
APTT BLD: 48.7 SEC — HIGH (ref 27.5–35.5)
AST SERPL-CCNC: 25 U/L — SIGNIFICANT CHANGE UP (ref 10–40)
AST SERPL-CCNC: 28 U/L — SIGNIFICANT CHANGE UP (ref 10–40)
AST SERPL-CCNC: 28 U/L — SIGNIFICANT CHANGE UP (ref 10–40)
AST SERPL-CCNC: 29 U/L — SIGNIFICANT CHANGE UP (ref 10–40)
BASE EXCESS BLDV CALC-SCNC: 0.3 MMOL/L — SIGNIFICANT CHANGE UP (ref -2–3)
BILIRUB SERPL-MCNC: 0.8 MG/DL — SIGNIFICANT CHANGE UP (ref 0.2–1.2)
BILIRUB SERPL-MCNC: 0.9 MG/DL — SIGNIFICANT CHANGE UP (ref 0.2–1.2)
BILIRUB SERPL-MCNC: 0.9 MG/DL — SIGNIFICANT CHANGE UP (ref 0.2–1.2)
BILIRUB SERPL-MCNC: 1.2 MG/DL — SIGNIFICANT CHANGE UP (ref 0.2–1.2)
BUN SERPL-MCNC: 51 MG/DL — HIGH (ref 7–23)
BUN SERPL-MCNC: 78 MG/DL — HIGH (ref 7–23)
BUN SERPL-MCNC: 78 MG/DL — HIGH (ref 7–23)
BUN SERPL-MCNC: 89 MG/DL — HIGH (ref 7–23)
CA-I SERPL-SCNC: 1.18 MMOL/L — SIGNIFICANT CHANGE UP (ref 1.15–1.33)
CALCIUM SERPL-MCNC: 7.6 MG/DL — LOW (ref 8.4–10.5)
CALCIUM SERPL-MCNC: 8 MG/DL — LOW (ref 8.4–10.5)
CALCIUM SERPL-MCNC: 8 MG/DL — LOW (ref 8.4–10.5)
CALCIUM SERPL-MCNC: 8.4 MG/DL — SIGNIFICANT CHANGE UP (ref 8.4–10.5)
CHLORIDE SERPL-SCNC: 101 MMOL/L — SIGNIFICANT CHANGE UP (ref 96–108)
CHLORIDE SERPL-SCNC: 101 MMOL/L — SIGNIFICANT CHANGE UP (ref 96–108)
CHLORIDE SERPL-SCNC: 104 MMOL/L — SIGNIFICANT CHANGE UP (ref 96–108)
CHLORIDE SERPL-SCNC: 105 MMOL/L — SIGNIFICANT CHANGE UP (ref 96–108)
CO2 BLDV-SCNC: 25.8 MMOL/L — SIGNIFICANT CHANGE UP (ref 22–26)
CO2 SERPL-SCNC: 21 MMOL/L — LOW (ref 22–31)
CO2 SERPL-SCNC: 21 MMOL/L — LOW (ref 22–31)
CO2 SERPL-SCNC: 22 MMOL/L — SIGNIFICANT CHANGE UP (ref 22–31)
CO2 SERPL-SCNC: 25 MMOL/L — SIGNIFICANT CHANGE UP (ref 22–31)
CREAT SERPL-MCNC: 2.6 MG/DL — HIGH (ref 0.5–1.3)
CREAT SERPL-MCNC: 3.64 MG/DL — HIGH (ref 0.5–1.3)
CREAT SERPL-MCNC: 3.89 MG/DL — HIGH (ref 0.5–1.3)
CREAT SERPL-MCNC: 4.01 MG/DL — HIGH (ref 0.5–1.3)
CULTURE RESULTS: SIGNIFICANT CHANGE UP
EGFR: 17 ML/MIN/1.73M2 — LOW
EGFR: 18 ML/MIN/1.73M2 — LOW
EGFR: 19 ML/MIN/1.73M2 — LOW
EGFR: 28 ML/MIN/1.73M2 — LOW
GAS PNL BLDA: SIGNIFICANT CHANGE UP
GAS PNL BLDV: 137 MMOL/L — SIGNIFICANT CHANGE UP (ref 136–145)
GAS PNL BLDV: SIGNIFICANT CHANGE UP
GAS PNL BLDV: SIGNIFICANT CHANGE UP
GLUCOSE BLDC GLUCOMTR-MCNC: 120 MG/DL — HIGH (ref 70–99)
GLUCOSE BLDC GLUCOMTR-MCNC: 126 MG/DL — HIGH (ref 70–99)
GLUCOSE BLDC GLUCOMTR-MCNC: 92 MG/DL — SIGNIFICANT CHANGE UP (ref 70–99)
GLUCOSE BLDC GLUCOMTR-MCNC: 92 MG/DL — SIGNIFICANT CHANGE UP (ref 70–99)
GLUCOSE BLDC GLUCOMTR-MCNC: 96 MG/DL — SIGNIFICANT CHANGE UP (ref 70–99)
GLUCOSE SERPL-MCNC: 116 MG/DL — HIGH (ref 70–99)
GLUCOSE SERPL-MCNC: 122 MG/DL — HIGH (ref 70–99)
GLUCOSE SERPL-MCNC: 134 MG/DL — HIGH (ref 70–99)
GLUCOSE SERPL-MCNC: 138 MG/DL — HIGH (ref 70–99)
GRAM STN FLD: SIGNIFICANT CHANGE UP
HCO3 BLDV-SCNC: 25 MMOL/L — SIGNIFICANT CHANGE UP (ref 22–29)
HCT VFR BLD CALC: 24.8 % — LOW (ref 39–50)
HCT VFR BLD CALC: 25.5 % — LOW (ref 39–50)
HCT VFR BLD CALC: 26.1 % — LOW (ref 39–50)
HCT VFR BLD CALC: 29.5 % — LOW (ref 39–50)
HGB BLD-MCNC: 10.4 G/DL — LOW (ref 13–17)
HGB BLD-MCNC: 8.6 G/DL — LOW (ref 13–17)
HGB BLD-MCNC: 8.9 G/DL — LOW (ref 13–17)
HGB BLD-MCNC: 9.1 G/DL — LOW (ref 13–17)
INR BLD: 1.13 — SIGNIFICANT CHANGE UP (ref 0.88–1.16)
INR BLD: 1.17 — HIGH (ref 0.88–1.16)
INR BLD: 1.17 — HIGH (ref 0.88–1.16)
INR BLD: 1.2 — HIGH (ref 0.88–1.16)
INR BLD: 1.23 — HIGH (ref 0.88–1.16)
ISTAT ARTERIAL BE: -2 MMOL/L — SIGNIFICANT CHANGE UP (ref -2–3)
ISTAT ARTERIAL GLUCOSE: 124 MG/DL — HIGH (ref 70–99)
ISTAT ARTERIAL HCO3: 21 MMOL/L — LOW (ref 22–26)
ISTAT ARTERIAL HEMATOCRIT: 24 % — LOW (ref 39–50)
ISTAT ARTERIAL HEMOGLOBIN: 8.2 G/DL — LOW (ref 13–17)
ISTAT ARTERIAL IONIZED CALCIUM: 1.15 MMOL/L — SIGNIFICANT CHANGE UP (ref 1.12–1.3)
ISTAT ARTERIAL PCO2: 28 MMHG — LOW (ref 35–45)
ISTAT ARTERIAL PH: 7.49 — HIGH (ref 7.35–7.45)
ISTAT ARTERIAL PO2: 79 MMHG — LOW (ref 80–105)
ISTAT ARTERIAL POTASSIUM: 3.4 MMOL/L — LOW (ref 3.5–5.3)
ISTAT ARTERIAL SO2: 97 % — SIGNIFICANT CHANGE UP (ref 95–98)
ISTAT ARTERIAL SODIUM: 137 MMOL/L — SIGNIFICANT CHANGE UP (ref 135–145)
ISTAT ARTERIAL TCO2: 22 MMOL/L — SIGNIFICANT CHANGE UP (ref 22–31)
LACTATE SERPL-SCNC: 1.2 MMOL/L — SIGNIFICANT CHANGE UP (ref 0.5–2)
LDH SERPL L TO P-CCNC: 616 U/L — HIGH (ref 50–242)
MAGNESIUM SERPL-MCNC: 1.9 MG/DL — SIGNIFICANT CHANGE UP (ref 1.6–2.6)
MAGNESIUM SERPL-MCNC: 2 MG/DL — SIGNIFICANT CHANGE UP (ref 1.6–2.6)
MAGNESIUM SERPL-MCNC: 2.1 MG/DL — SIGNIFICANT CHANGE UP (ref 1.6–2.6)
MAGNESIUM SERPL-MCNC: 2.2 MG/DL — SIGNIFICANT CHANGE UP (ref 1.6–2.6)
MCHC RBC-ENTMCNC: 30.8 PG — SIGNIFICANT CHANGE UP (ref 27–34)
MCHC RBC-ENTMCNC: 30.8 PG — SIGNIFICANT CHANGE UP (ref 27–34)
MCHC RBC-ENTMCNC: 30.9 PG — SIGNIFICANT CHANGE UP (ref 27–34)
MCHC RBC-ENTMCNC: 31.1 PG — SIGNIFICANT CHANGE UP (ref 27–34)
MCHC RBC-ENTMCNC: 34.7 GM/DL — SIGNIFICANT CHANGE UP (ref 32–36)
MCHC RBC-ENTMCNC: 34.9 GM/DL — SIGNIFICANT CHANGE UP (ref 32–36)
MCHC RBC-ENTMCNC: 34.9 GM/DL — SIGNIFICANT CHANGE UP (ref 32–36)
MCHC RBC-ENTMCNC: 35.3 GM/DL — SIGNIFICANT CHANGE UP (ref 32–36)
MCV RBC AUTO: 88.2 FL — SIGNIFICANT CHANGE UP (ref 80–100)
MCV RBC AUTO: 88.3 FL — SIGNIFICANT CHANGE UP (ref 80–100)
MCV RBC AUTO: 88.5 FL — SIGNIFICANT CHANGE UP (ref 80–100)
MCV RBC AUTO: 89.2 FL — SIGNIFICANT CHANGE UP (ref 80–100)
METHOD TYPE: SIGNIFICANT CHANGE UP
NRBC # BLD: 0 /100 WBCS — SIGNIFICANT CHANGE UP (ref 0–0)
NRBC # BLD: 1 /100 WBCS — HIGH (ref 0–0)
ORGANISM # SPEC MICROSCOPIC CNT: SIGNIFICANT CHANGE UP
ORGANISM # SPEC MICROSCOPIC CNT: SIGNIFICANT CHANGE UP
PCO2 BLDV: 38 MMHG — LOW (ref 42–55)
PH BLDV: 7.42 — SIGNIFICANT CHANGE UP (ref 7.32–7.43)
PHOSPHATE SERPL-MCNC: 3.3 MG/DL — SIGNIFICANT CHANGE UP (ref 2.5–4.5)
PHOSPHATE SERPL-MCNC: 4.2 MG/DL — SIGNIFICANT CHANGE UP (ref 2.5–4.5)
PHOSPHATE SERPL-MCNC: 4.5 MG/DL — SIGNIFICANT CHANGE UP (ref 2.5–4.5)
PHOSPHATE SERPL-MCNC: 4.7 MG/DL — HIGH (ref 2.5–4.5)
PLATELET # BLD AUTO: 44 K/UL — LOW (ref 150–400)
PLATELET # BLD AUTO: 48 K/UL — LOW (ref 150–400)
PLATELET # BLD AUTO: 59 K/UL — LOW (ref 150–400)
PLATELET # BLD AUTO: 72 K/UL — LOW (ref 150–400)
PO2 BLDV: 41 MMHG — SIGNIFICANT CHANGE UP (ref 25–45)
POTASSIUM BLDV-SCNC: 3.4 MMOL/L — LOW (ref 3.5–5.1)
POTASSIUM SERPL-MCNC: 3.3 MMOL/L — LOW (ref 3.5–5.3)
POTASSIUM SERPL-MCNC: 3.4 MMOL/L — LOW (ref 3.5–5.3)
POTASSIUM SERPL-MCNC: 3.6 MMOL/L — SIGNIFICANT CHANGE UP (ref 3.5–5.3)
POTASSIUM SERPL-MCNC: 3.8 MMOL/L — SIGNIFICANT CHANGE UP (ref 3.5–5.3)
POTASSIUM SERPL-SCNC: 3.3 MMOL/L — LOW (ref 3.5–5.3)
POTASSIUM SERPL-SCNC: 3.4 MMOL/L — LOW (ref 3.5–5.3)
POTASSIUM SERPL-SCNC: 3.6 MMOL/L — SIGNIFICANT CHANGE UP (ref 3.5–5.3)
POTASSIUM SERPL-SCNC: 3.8 MMOL/L — SIGNIFICANT CHANGE UP (ref 3.5–5.3)
PROT SERPL-MCNC: 4.1 G/DL — LOW (ref 6–8.3)
PROT SERPL-MCNC: 4.4 G/DL — LOW (ref 6–8.3)
PROT SERPL-MCNC: 4.7 G/DL — LOW (ref 6–8.3)
PROT SERPL-MCNC: 5.1 G/DL — LOW (ref 6–8.3)
PROTHROM AB SERPL-ACNC: 13.5 SEC — HIGH (ref 10.5–13.4)
PROTHROM AB SERPL-ACNC: 13.9 SEC — HIGH (ref 10.5–13.4)
PROTHROM AB SERPL-ACNC: 13.9 SEC — HIGH (ref 10.5–13.4)
PROTHROM AB SERPL-ACNC: 14.3 SEC — HIGH (ref 10.5–13.4)
PROTHROM AB SERPL-ACNC: 14.7 SEC — HIGH (ref 10.5–13.4)
RBC # BLD: 2.78 M/UL — LOW (ref 4.2–5.8)
RBC # BLD: 2.89 M/UL — LOW (ref 4.2–5.8)
RBC # BLD: 2.95 M/UL — LOW (ref 4.2–5.8)
RBC # BLD: 3.34 M/UL — LOW (ref 4.2–5.8)
RBC # FLD: 15.6 % — HIGH (ref 10.3–14.5)
RBC # FLD: 15.7 % — HIGH (ref 10.3–14.5)
RBC # FLD: 15.8 % — HIGH (ref 10.3–14.5)
RBC # FLD: 15.8 % — HIGH (ref 10.3–14.5)
SAO2 % BLDV: 65.6 % — LOW (ref 67–88)
SODIUM SERPL-SCNC: 135 MMOL/L — SIGNIFICANT CHANGE UP (ref 135–145)
SODIUM SERPL-SCNC: 136 MMOL/L — SIGNIFICANT CHANGE UP (ref 135–145)
SODIUM SERPL-SCNC: 137 MMOL/L — SIGNIFICANT CHANGE UP (ref 135–145)
SODIUM SERPL-SCNC: 140 MMOL/L — SIGNIFICANT CHANGE UP (ref 135–145)
SPECIMEN SOURCE: SIGNIFICANT CHANGE UP
SPECIMEN SOURCE: SIGNIFICANT CHANGE UP
WBC # BLD: 10.77 K/UL — HIGH (ref 3.8–10.5)
WBC # BLD: 12.11 K/UL — HIGH (ref 3.8–10.5)
WBC # BLD: 12.99 K/UL — HIGH (ref 3.8–10.5)
WBC # BLD: 19.62 K/UL — HIGH (ref 3.8–10.5)
WBC # FLD AUTO: 10.77 K/UL — HIGH (ref 3.8–10.5)
WBC # FLD AUTO: 12.11 K/UL — HIGH (ref 3.8–10.5)
WBC # FLD AUTO: 12.99 K/UL — HIGH (ref 3.8–10.5)
WBC # FLD AUTO: 19.62 K/UL — HIGH (ref 3.8–10.5)

## 2023-03-27 PROCEDURE — 76937 US GUIDE VASCULAR ACCESS: CPT | Mod: 26

## 2023-03-27 PROCEDURE — 99291 CRITICAL CARE FIRST HOUR: CPT | Mod: 25

## 2023-03-27 PROCEDURE — 31645 BRNCHSC W/THER ASPIR 1ST: CPT

## 2023-03-27 PROCEDURE — 36556 INSERT NON-TUNNEL CV CATH: CPT

## 2023-03-27 RX ORDER — POTASSIUM CHLORIDE 20 MEQ
20 PACKET (EA) ORAL ONCE
Refills: 0 | Status: COMPLETED | OUTPATIENT
Start: 2023-03-27 | End: 2023-03-27

## 2023-03-27 RX ORDER — PROPOFOL 10 MG/ML
10 INJECTION, EMULSION INTRAVENOUS
Qty: 1000 | Refills: 0 | Status: DISCONTINUED | OUTPATIENT
Start: 2023-03-27 | End: 2023-03-28

## 2023-03-27 RX ORDER — CISATRACURIUM BESYLATE 2 MG/ML
10 INJECTION INTRAVENOUS ONCE
Refills: 0 | Status: COMPLETED | OUTPATIENT
Start: 2023-03-27 | End: 2023-03-27

## 2023-03-27 RX ORDER — FENTANYL CITRATE 50 UG/ML
50 INJECTION INTRAVENOUS ONCE
Refills: 0 | Status: DISCONTINUED | OUTPATIENT
Start: 2023-03-27 | End: 2023-03-27

## 2023-03-27 RX ORDER — ACETAMINOPHEN 500 MG
1000 TABLET ORAL ONCE
Refills: 0 | Status: COMPLETED | OUTPATIENT
Start: 2023-03-27 | End: 2023-03-28

## 2023-03-27 RX ORDER — FENTANYL CITRATE 50 UG/ML
25 INJECTION INTRAVENOUS
Refills: 0 | Status: DISCONTINUED | OUTPATIENT
Start: 2023-03-27 | End: 2023-04-02

## 2023-03-27 RX ADMIN — AMIODARONE HYDROCHLORIDE 400 MILLIGRAM(S): 400 TABLET ORAL at 21:01

## 2023-03-27 RX ADMIN — LACOSAMIDE 120 MILLIGRAM(S): 50 TABLET ORAL at 06:38

## 2023-03-27 RX ADMIN — FENTANYL CITRATE 50 MICROGRAM(S): 50 INJECTION INTRAVENOUS at 18:51

## 2023-03-27 RX ADMIN — Medication 81 MILLIGRAM(S): at 11:46

## 2023-03-27 RX ADMIN — PIPERACILLIN AND TAZOBACTAM 25 GRAM(S): 4; .5 INJECTION, POWDER, LYOPHILIZED, FOR SOLUTION INTRAVENOUS at 05:45

## 2023-03-27 RX ADMIN — LACOSAMIDE 120 MILLIGRAM(S): 50 TABLET ORAL at 19:02

## 2023-03-27 RX ADMIN — BUMETANIDE 2 MILLIGRAM(S): 0.25 INJECTION INTRAMUSCULAR; INTRAVENOUS at 11:46

## 2023-03-27 RX ADMIN — PIPERACILLIN AND TAZOBACTAM 25 GRAM(S): 4; .5 INJECTION, POWDER, LYOPHILIZED, FOR SOLUTION INTRAVENOUS at 14:19

## 2023-03-27 RX ADMIN — PANTOPRAZOLE SODIUM 40 MILLIGRAM(S): 20 TABLET, DELAYED RELEASE ORAL at 11:46

## 2023-03-27 RX ADMIN — AMIODARONE HYDROCHLORIDE 400 MILLIGRAM(S): 400 TABLET ORAL at 14:15

## 2023-03-27 RX ADMIN — PROPOFOL 4.63 MICROGRAM(S)/KG/MIN: 10 INJECTION, EMULSION INTRAVENOUS at 23:54

## 2023-03-27 RX ADMIN — PIPERACILLIN AND TAZOBACTAM 25 GRAM(S): 4; .5 INJECTION, POWDER, LYOPHILIZED, FOR SOLUTION INTRAVENOUS at 21:02

## 2023-03-27 RX ADMIN — Medication 57.5 MILLIGRAM(S): at 19:01

## 2023-03-27 RX ADMIN — FENTANYL CITRATE 25 MICROGRAM(S): 50 INJECTION INTRAVENOUS at 01:25

## 2023-03-27 RX ADMIN — Medication 100 MILLIEQUIVALENT(S): at 05:45

## 2023-03-27 RX ADMIN — FENTANYL CITRATE 25 MICROGRAM(S): 50 INJECTION INTRAVENOUS at 00:55

## 2023-03-27 RX ADMIN — CHLORHEXIDINE GLUCONATE 1 APPLICATION(S): 213 SOLUTION TOPICAL at 07:21

## 2023-03-27 RX ADMIN — Medication 57.5 MILLIGRAM(S): at 06:38

## 2023-03-27 RX ADMIN — BUMETANIDE 2 MILLIGRAM(S): 0.25 INJECTION INTRAMUSCULAR; INTRAVENOUS at 19:01

## 2023-03-27 RX ADMIN — FENTANYL CITRATE 50 MICROGRAM(S): 50 INJECTION INTRAVENOUS at 18:36

## 2023-03-27 RX ADMIN — CHLORHEXIDINE GLUCONATE 15 MILLILITER(S): 213 SOLUTION TOPICAL at 17:26

## 2023-03-27 RX ADMIN — AMIODARONE HYDROCHLORIDE 400 MILLIGRAM(S): 400 TABLET ORAL at 05:45

## 2023-03-27 RX ADMIN — CISATRACURIUM BESYLATE 10 MILLIGRAM(S): 2 INJECTION INTRAVENOUS at 18:36

## 2023-03-27 RX ADMIN — BUMETANIDE 2 MILLIGRAM(S): 0.25 INJECTION INTRAMUSCULAR; INTRAVENOUS at 03:08

## 2023-03-27 RX ADMIN — CHLORHEXIDINE GLUCONATE 15 MILLILITER(S): 213 SOLUTION TOPICAL at 05:45

## 2023-03-27 NOTE — PROGRESS NOTE ADULT - SUBJECTIVE AND OBJECTIVE BOX
CTICU  CRITICAL  CARE  attending     Hand off received 					   Pertinent clinical, laboratory, radiographic, hemodynamic, echocardiographic, respiratory data, microbiologic data and chart were reviewed and analyzed frequently throughout the course of the day and night        52 year old male with HTN, type A aortic dissection s/p Dacron grafts, AV resuspension in ,CAD s/p CABG x 1 SVG to RCA (2013 with Dr. Medina), seizure disorder (last episode on 22).  He is a current every day marijuana user  CTA chest, abdomen and pelvis 22: Status post graft repair of the ascending aorta and portion of aortic arch.  Dissecting aneurysm of the descending thoracic aorta (measuring up to 5.7 cm) and abdominal aorta (3.5 cm infrarenal), similar to the MR angiogram of 2022.  Nonocclusive dissection flap present within the innominate artery, aneurysmal right subclavian artery and proximal right common carotid artery as well as aneurysmal left common iliac artery.    He was referred by Dr. Jacqueline Gonsales.  He ws screened for Triomphe study but did not qualify.    S/P AVR  Aortic arch replacement  S/P CABG (SVG to RCA).  S/P Aorto left axillary by pass.  S/P TEVAR to descending aorta.        FAMILY HISTORY:  No pertinent family history in first degree relatives    PAST MEDICAL & SURGICAL HISTORY:  HTN (hypertension)  Aortic dissection  CAD (coronary artery disease)  Seizure disorder  S/P aortic bifurcation bypass graft  S/P CABG x 1        14 system review was unremarkable    Vital signs, hemodynamic and respiratory parameters were reviewed from the bedside nursing flow sheet.  ICU Vital Signs Last 24 Hrs  T(C): 36.7 (28 Mar 2023 01:09), Max: 36.8 (27 Mar 2023 05:11)  T(F): 98 (28 Mar 2023 01:09), Max: 98.2 (27 Mar 2023 05:11)  HR: 76 (28 Mar 2023 01:00) (64 - 85)  BP: --  BP(mean): --  ABP: 137/67 (28 Mar 2023 01:00) (92/64 - 160/75)  ABP(mean): 85 (28 Mar 2023 01:00) (65 - 102)  RR: 13 (28 Mar 2023 02:00) (12 - 22)  SpO2: 100% (28 Mar 2023 01:00) (90% - 100%)    O2 Parameters below as of 28 Mar 2023 02:00  Patient On (Oxygen Delivery Method): ventilator    O2 Concentration (%): 40      Adult Advanced Hemodynamics Last 24 Hrs  CVP(mm Hg): 5 (28 Mar 2023 01:00) (5 - 15)  CVP(cm H2O): --  CO: --  CI: --  PA: --  PA(mean): --  PCWP: --  SVR: --  SVRI: --  PVR: --  PVRI: --, ABG - ( 27 Mar 2023 20:47 )  pH, Arterial: 7.49  pH, Blood: x     /  pCO2: 34    /  pO2: 98    / HCO3: 26    / Base Excess: 2.9   /  SaO2: 98.7              Mode: AC/ CMV (Assist Control/ Continuous Mandatory Ventilation)  RR (machine): 12  TV (machine): 650  FiO2: 40  PEEP: 5  MAP: 9  PIP: 25    Intake and output was reviewed and the fluid balance was calculated  Daily     Daily Weight in k.8 (27 Mar 2023 20:05)  I&O's Summary    26 Mar 2023 07:01  -  27 Mar 2023 07:00  --------------------------------------------------------  IN: 2898.3 mL / OUT: 4840 mL / NET: -1941.7 mL    27 Mar 2023 07:01  -  28 Mar 2023 01:50  --------------------------------------------------------  IN: 2436.1 mL / OUT: 5580 mL / NET: -3143.9 mL        All lines and drain sites were assessed    Neuro: No change in the mental status from the baseline. Follows commands.  Moves all 4 extremities.  Neck: No JVD.  CVS: S1, S2, No S3.  Lungs: Good air entry bilaterally.  Abd: Soft. No tenderness. + Bowel sounds.  Vascular: + DP/PT.  Extremities: No edema.  Lymphatic: Normal.  Skin: No abnormalities.      labs  CBC Full  -  ( 27 Mar 2023 20:47 )  WBC Count : 19.62 K/uL  RBC Count : 3.34 M/uL  Hemoglobin : 10.4 g/dL  Hematocrit : 29.5 %  Platelet Count - Automated : 72 K/uL  Mean Cell Volume : 88.3 fl  Mean Cell Hemoglobin : 31.1 pg  Mean Cell Hemoglobin Concentration : 35.3 gm/dL  Auto Neutrophil # : x  Auto Lymphocyte # : x  Auto Monocyte # : x  Auto Eosinophil # : x  Auto Basophil # : x  Auto Neutrophil % : x  Auto Lymphocyte % : x  Auto Monocyte % : x  Auto Eosinophil % : x  Auto Basophil % : x        136  |  101  |  51<H>  ----------------------------<  134<H>  3.3<L>   |  25  |  2.60<H>    Ca    8.0<L>      27 Mar 2023 20:47  Phos  3.3       Mg     1.9         TPro  5.1<L>  /  Alb  2.5<L>  /  TBili  1.2  /  DBili  x   /  AST  29  /  ALT  10  /  AlkPhos  74      PT/INR - ( 27 Mar 2023 20:47 )   PT: 14.3 sec;   INR: 1.20          PTT - ( 27 Mar 2023 20:47 )  PTT:29.1 sec  The current medications were reviewed   MEDICATIONS  (STANDING):  aMIOdarone    Tablet   Oral   aMIOdarone    Tablet 400 milliGRAM(s) Oral every 8 hours  buMETAnide Injectable 2 milliGRAM(s) IV Push every 8 hours  chlorhexidine 0.12% Liquid 15 milliLiter(s) Oral Mucosa every 12 hours  chlorhexidine 2% Cloths 1 Application(s) Topical daily  dexMEDEtomidine Infusion 0.4 MICROgram(s)/kG/Hr (7.71 mL/Hr) IV Continuous <Continuous>  dextrose 10%. 1000 milliLiter(s) (20 mL/Hr) IV Continuous <Continuous>  dextrose 5%. 1000 milliLiter(s) (50 mL/Hr) IV Continuous <Continuous>  dextrose 5%. 1000 milliLiter(s) (100 mL/Hr) IV Continuous <Continuous>  dextrose 50% Injectable 50 milliLiter(s) IV Push every 15 minutes  dextrose 50% Injectable 25 milliLiter(s) IV Push every 15 minutes  glucagon  Injectable 1 milliGRAM(s) IntraMuscular once  insulin lispro (ADMELOG) corrective regimen sliding scale   SubCutaneous every 6 hours  lacosamide IVPB 100 milliGRAM(s) IV Intermittent every 12 hours  pantoprazole  Injectable 40 milliGRAM(s) IV Push daily  phenylephrine    Infusion 0.2 MICROgram(s)/kG/Min (5.78 mL/Hr) IV Continuous <Continuous>  piperacillin/tazobactam IVPB.. 3.375 Gram(s) IV Intermittent every 8 hours  propofol Infusion 10 MICROgram(s)/kG/Min (4.63 mL/Hr) IV Continuous <Continuous>  sodium chloride 0.9%. 1000 milliLiter(s) (10 mL/Hr) IV Continuous <Continuous>  valproate sodium  IVPB 750 milliGRAM(s) IV Intermittent every 12 hours    MEDICATIONS  (PRN):  acetaminophen   IVPB .. 1000 milliGRAM(s) IV Intermittent once PRN Mild Pain (1 - 3)  dextrose Oral Gel 15 Gram(s) Oral once PRN Blood Glucose LESS THAN 70 milliGRAM(s)/deciliter  fentaNYL    Injectable 25 MICROGram(s) IV Push every 3 hours PRN Severe Pain (7 - 10)              53 year old  Male with expanding aortic arch and descending aortic aneurysm   S/P AVR  Aortic arch replacement  S/P CABG (SVG to RCA).  S/P Aorto left axillary by pass.  S/P TEVAR to descending aorta.  Renal Failure   Seizure disorder  Respiratory Failure  Thrombocytopenia.   Post hemorrhagic anemia.  Hemodynamically stable.  Good oxygenation.  Fair urine out put.  HD four liters today.      My plan includes :  Statin Rx.  IV antibiotic Rx.  Anti Seizure Rx.  Enteral feeding.  Close hemodynamic, ventilatory and drain monitoring and management  Monitor for arrhythmias and monitor parameters for organ perfusion  Monitor neurologic status  Monitor renal function.  Head of the bed should remain elevated to 45 deg .   Chest PT and IS will be encouraged  Monitor adequacy of oxygenation and ventilation and attempt to wean oxygen  Nutritional goals will be met using po eventually , ensure adequate caloric intake and monitor the same  Stress ulcer and VTE prophylaxis will be achieved    Glycemic control is satisfactory  Electrolytes have been repleted as necessary and wound care has been carried out. Pain control has been achieved.   Aggressive physical therapy and early mobility and ambulation goals will be met   The family was updated about the course and plan  CRITICAL CARE TIME SPENT in evaluation and management, reassessments, review and interpretation of labs and x-rays, ventilator and hemodynamic management, formulating a plan and coordinating care: ___30____ MIN.  Time does not include procedural time.  CTICU ATTENDING     					    José Miguel Torres MD

## 2023-03-27 NOTE — PROGRESS NOTE ADULT - ASSESSMENT
*********INCOMPLETE**********          54 yo M w/ HTN, seizure disorder and no underlying CKD who is s/p scheduled replacement of aortic arch and TEVAR on 3/20. Prolonger surgery with significant amount of blood products and fluids recieved intra-op. Post-op pt intubated, on pressors, with severe acidosis. Started on CVVHD 3/21 for refractory acidosis, and was then continued for vol removal, tolerating CVVHD without any complications. Pressor and oxygen requirement decreasing, and remains non-oliguric 3/24          #Non-oliguric iATN due to intra-op hemodynamic changes (pt requiring 7 units pRBC intra-op and post-op pt in shock. Shock now improved)  BCr 1.2, eGFR 67 (3/9)  UA w/ trace proteinuria and large blood w/o RBCs. [id number] (3/21)  has been off of CVVHD since 3/24- diuresing well on  bumex 2q8; uop 5L/24 hours  given rising CR and clinical  euvolemia would DC diuretics  will assess need for HD on 3/27 given rising Cr- though stable electrolytes and volume status  Met acidosis resolved. Lactate normal now  Strict I&Os  BMP per icu protocol      Plan discussed with CTICU team               54 yo M w/ HTN, seizure disorder and no underlying CKD who is s/p scheduled replacement of aortic arch and TEVAR on 3/20. Prolonger surgery with significant amount of blood products and fluids received intra-op. Post-op pt intubated, on pressors, with severe acidosis. Started on CVVHD 3/21 for refractory acidosis, and was then continued for vol removal, tolerating CVVHD without any complications, dc'ed on 3/24 as became more hemodynamically stable. Started on diuresis 3/24 with good response, currently on Bumex 2mg Q8hr      #Non-oliguric iATN due to intra-op hemodynamic changes (pt requiring 7 units pRBC intra-op and post-op pt in shock. Shock now resolved)  BCr 1.2, eGFR 67 (3/9)  UA w/ trace proteinuria and large blood w/o RBCs. [id number]  CVVHD dc'ed 3/24- diuresing well on bumex 2q8; uop 4.8L/24 hours with net negative 1.9L/24 hours  Cr plateaued  Clearance is acceptable though BUN gradually uptrending  Discussed with CTICU team, who would like more fluid off to aid in extubation in the next 24 hours. Will arrange 3 hour HD session today and aim for 3 L off if permitted by BP (SBP>110). Will use F180 dialyzer and 400 ml/min BFR to enhance BUN clearance  Strict I&Os  BMP per icu protocol      Plan discussed with CTICU team

## 2023-03-27 NOTE — PROGRESS NOTE ADULT - SUBJECTIVE AND OBJECTIVE BOX
Patient is a 53y Male seen and evaluated at bedside.       Meds:    aMIOdarone    Tablet    aMIOdarone    Tablet 400 every 8 hours  aspirin  chewable 81 daily  buMETAnide Injectable 2 every 8 hours  chlorhexidine 0.12% Liquid 15 every 12 hours  chlorhexidine 2% Cloths 1 daily  dexMEDEtomidine Infusion 0.4 <Continuous>  dextrose 10%. 1000 <Continuous>  dextrose 5%. 1000 <Continuous>  dextrose 5%. 1000 <Continuous>  dextrose 50% Injectable 50 every 15 minutes  dextrose 50% Injectable 25 every 15 minutes  dextrose Oral Gel 15 once PRN  glucagon  Injectable 1 once  insulin lispro (ADMELOG) corrective regimen sliding scale  every 6 hours  lacosamide IVPB 100 every 12 hours  pantoprazole  Injectable 40 daily  phenylephrine    Infusion 0.2 <Continuous>  piperacillin/tazobactam IVPB.. 3.375 every 8 hours  propofol Infusion 10 <Continuous>  sodium chloride 0.9%. 1000 <Continuous>  valproate sodium  IVPB 750 every 12 hours      T(C): , Max: 37 (23 @ 01:15)  T(F): , Max: 98.6 (23 @ 01:15)  HR: 73 (23 @ 11:00)  BP: --  BP(mean): --  RR: 17 (23 @ 11:00)  SpO2: 98% (23 @ 11:00)  Wt(kg): --     @ 07:  -   @ 07:00  --------------------------------------------------------  IN: 2898.3 mL / OUT: 4840 mL / NET: -1941.7 mL     @ 07:  -   @ 12:10  --------------------------------------------------------  IN: 225.6 mL / OUT: 325 mL / NET: -99.4 mL          Review of Systems:  ROS negative except as per HPI      PHYSICAL EXAM:  GENERAL: intubated; sedated   CHEST/LUNG:decreased breath sounds   HEART: normal S1S2, RRR  ABDOMEN: Soft, Nontender, +BS,   EXTREMITIES: minimal edema BL LE      LABS:                        9.1    12.99 )-----------( 48       ( 27 Mar 2023 09:38 )             26.1     03-27    140  |  105  |  89<H>  ----------------------------<  122<H>  3.8   |  22  |  4.01<H>    Ca    8.4      27 Mar 2023 09:38  Phos  4.7     03-  Mg     2.1     -    TPro  4.7<L>  /  Alb  2.4<L>  /  TBili  0.9  /  DBili  x   /  AST  28  /  ALT  9<L>  /  AlkPhos  67  03-27      PT/INR - ( 27 Mar 2023 09:38 )   PT: 13.9 sec;   INR: 1.17          PTT - ( 27 Mar 2023 09:38 )  PTT:30.3 sec  Urinalysis Basic - ( 25 Mar 2023 23:36 )    Color: Yellow / Appearance: Clear / S.025 / pH: x  Gluc: x / Ketone: NEGATIVE  / Bili: Negative / Urobili: 0.2 E.U./dL   Blood: x / Protein: 100 mg/dL / Nitrite: NEGATIVE   Leuk Esterase: NEGATIVE / RBC: < 5 /HPF / WBC < 5 /HPF   Sq Epi: x / Non Sq Epi: 0-5 /HPF / Bacteria: Present /HPF            RADIOLOGY & ADDITIONAL STUDIES:           Patient is a 53y Male seen and evaluated at bedside. Remains intubated, sedated.       Meds:    aMIOdarone    Tablet    aMIOdarone    Tablet 400 every 8 hours  aspirin  chewable 81 daily  buMETAnide Injectable 2 every 8 hours  chlorhexidine 0.12% Liquid 15 every 12 hours  chlorhexidine 2% Cloths 1 daily  dexMEDEtomidine Infusion 0.4 <Continuous>  dextrose 10%. 1000 <Continuous>  dextrose 5%. 1000 <Continuous>  dextrose 5%. 1000 <Continuous>  dextrose 50% Injectable 50 every 15 minutes  dextrose 50% Injectable 25 every 15 minutes  dextrose Oral Gel 15 once PRN  glucagon  Injectable 1 once  insulin lispro (ADMELOG) corrective regimen sliding scale  every 6 hours  lacosamide IVPB 100 every 12 hours  pantoprazole  Injectable 40 daily  phenylephrine    Infusion 0.2 <Continuous>  piperacillin/tazobactam IVPB.. 3.375 every 8 hours  propofol Infusion 10 <Continuous>  sodium chloride 0.9%. 1000 <Continuous>  valproate sodium  IVPB 750 every 12 hours      T(C): , Max: 37 (23 @ 01:15)  T(F): , Max: 98.6 (23 @ 01:15)  HR: 73 (23 @ 11:00)  BP: --  BP(mean): --  RR: 17 (23 @ 11:00)  SpO2: 98% (23 @ 11:00)  Wt(kg): --     @ 07:  -   @ 07:00  --------------------------------------------------------  IN: 2898.3 mL / OUT: 4840 mL / NET: -1941.7 mL     @ 07:  -   @ 12:10  --------------------------------------------------------  IN: 225.6 mL / OUT: 325 mL / NET: -99.4 mL          Review of Systems:  Unable to obtain      PHYSICAL EXAM:  GENERAL: intubated; sedated   CHEST/LUNG: decreased breath sounds in bases B/L, on MV  HEART: normal S1S2, RRR  ABDOMEN: Soft, Nontender, +BS,   EXTREMITIES: trace edema BL LE and UE      LABS:                        9.1    12.99 )-----------( 48       ( 27 Mar 2023 09:38 )             26.1     03-    140  |  105  |  89<H>  ----------------------------<  122<H>  3.8   |  22  |  4.01<H>    Ca    8.4      27 Mar 2023 09:38  Phos  4.7       Mg     2.1         TPro  4.7<L>  /  Alb  2.4<L>  /  TBili  0.9  /  DBili  x   /  AST  28  /  ALT  9<L>  /  AlkPhos  67  03-27      PT/INR - ( 27 Mar 2023 09:38 )   PT: 13.9 sec;   INR: 1.17          PTT - ( 27 Mar 2023 09:38 )  PTT:30.3 sec  Urinalysis Basic - ( 25 Mar 2023 23:36 )    Color: Yellow / Appearance: Clear / S.025 / pH: x  Gluc: x / Ketone: NEGATIVE  / Bili: Negative / Urobili: 0.2 E.U./dL   Blood: x / Protein: 100 mg/dL / Nitrite: NEGATIVE   Leuk Esterase: NEGATIVE / RBC: < 5 /HPF / WBC < 5 /HPF   Sq Epi: x / Non Sq Epi: 0-5 /HPF / Bacteria: Present /HPF            RADIOLOGY & ADDITIONAL STUDIES:

## 2023-03-27 NOTE — PROGRESS NOTE ADULT - SUBJECTIVE AND OBJECTIVE BOX
CTICU  CRITICAL  CARE  attending     Hand off received 					   Pertinent clinical, laboratory, radiographic, hemodynamic, echocardiographic, respiratory data, microbiologic data and chart were reviewed and analyzed frequently throughout the course of the day and night  Patient seen and examined with CTS/ SH attending at bedside  Pt is a 53y , Male, HEALTH ISSUES - PROBLEM Dx:      , FAMILY HISTORY:  No pertinent family history in first degree relatives    PAST MEDICAL & SURGICAL HISTORY:  HTN (hypertension)      Aortic dissection      CAD (coronary artery disease)      Seizure disorder      S/P aortic bifurcation bypass graft      S/P CABG x 1        Patient is a 53y old  Male who presents with a chief complaint of expanding aortic arch and descending aortic aneurysm (27 Mar 2023 12:10)      14 system review limited by mentation and multiorgan morbidity     Vital signs, hemodynamic and respiratory parameters were reviewed from the bedside nursing flowsheet.  ICU Vital Signs Last 24 Hrs  T(C): 36.4 (27 Mar 2023 22:03), Max: 37 (27 Mar 2023 01:15)  T(F): 97.6 (27 Mar 2023 22:03), Max: 98.6 (27 Mar 2023 01:15)  HR: 74 (27 Mar 2023 23:00) (64 - 85)  BP: --  BP(mean): --  ABP: 132/69 (27 Mar 2023 23:00) (92/64 - 160/75)  ABP(mean): 88 (27 Mar 2023 23:00) (65 - 102)  RR: 13 (27 Mar 2023 23:00) (12 - 22)  SpO2: 100% (27 Mar 2023 23:00) (90% - 100%)    O2 Parameters below as of 27 Mar 2023 23:00  Patient On (Oxygen Delivery Method): ventilator    O2 Concentration (%): 40      Adult Advanced Hemodynamics Last 24 Hrs  CVP(mm Hg): 5 (27 Mar 2023 23:00) (5 - 15)  CVP(cm H2O): --  CO: --  CI: --  PA: --  PA(mean): --  PCWP: --  SVR: --  SVRI: --  PVR: --  PVRI: --, ABG - ( 27 Mar 2023 20:47 )  pH, Arterial: 7.49  pH, Blood: x     /  pCO2: 34    /  pO2: 98    / HCO3: 26    / Base Excess: 2.9   /  SaO2: 98.7              Mode: AC/ CMV (Assist Control/ Continuous Mandatory Ventilation)  RR (machine): 12  TV (machine): 650  FiO2: 40  PEEP: 5  MAP: 9  PIP: 25    Intake and output was reviewed and the fluid balance was calculated  Daily     Daily Weight in k.8 (27 Mar 2023 20:05)  I&O's Summary    26 Mar 2023 07:  -  27 Mar 2023 07:00  --------------------------------------------------------  IN: 2898.3 mL / OUT: 4840 mL / NET: -1941.7 mL    27 Mar 2023 07:  -  27 Mar 2023 23:51  --------------------------------------------------------  IN: 2050.1 mL / OUT: 5405 mL / NET: -3354.9 mL        All lines and drain sites were assessed  [facility]      POCT Blood Glucose.: 96 mg/dL (27 Mar 2023 23:24)    No acute change in focality  Auscultation of the chest reveals equal bs  Abdomen is soft  Extremities are warm and well perfused  Wounds appear clean and unremarkable  Antibiotics are periop    labs  CBC Full  -  ( 27 Mar 2023 20:47 )  WBC Count : 19.62 K/uL  RBC Count : 3.34 M/uL  Hemoglobin : 10.4 g/dL  Hematocrit : 29.5 %  Platelet Count - Automated : 72 K/uL  Mean Cell Volume : 88.3 fl  Mean Cell Hemoglobin : 31.1 pg  Mean Cell Hemoglobin Concentration : 35.3 gm/dL  Auto Neutrophil # : x  Auto Lymphocyte # : x  Auto Monocyte # : x  Auto Eosinophil # : x  Auto Basophil # : x  Auto Neutrophil % : x  Auto Lymphocyte % : x  Auto Monocyte % : x  Auto Eosinophil % : x  Auto Basophil % : x        136  |  101  |  51<H>  ----------------------------<  134<H>  3.3<L>   |  25  |  2.60<H>    Ca    8.0<L>      27 Mar 2023 20:47  Phos  3.3     03-  Mg     1.9     -    TPro  5.1<L>  /  Alb  2.5<L>  /  TBili  1.2  /  DBili  x   /  AST  29  /  ALT  10  /  AlkPhos  74  03-    PT/INR - ( 27 Mar 2023 20:47 )   PT: 14.3 sec;   INR: 1.20          PTT - ( 27 Mar 2023 20:47 )  PTT:29.1 sec  The current medications were reviewed   MEDICATIONS  (STANDING):  aMIOdarone    Tablet   Oral   aMIOdarone    Tablet 400 milliGRAM(s) Oral every 8 hours  buMETAnide Injectable 2 milliGRAM(s) IV Push every 8 hours  chlorhexidine 0.12% Liquid 15 milliLiter(s) Oral Mucosa every 12 hours  chlorhexidine 2% Cloths 1 Application(s) Topical daily  dexMEDEtomidine Infusion 0.4 MICROgram(s)/kG/Hr (7.71 mL/Hr) IV Continuous <Continuous>  dextrose 10%. 1000 milliLiter(s) (20 mL/Hr) IV Continuous <Continuous>  dextrose 5%. 1000 milliLiter(s) (50 mL/Hr) IV Continuous <Continuous>  dextrose 5%. 1000 milliLiter(s) (100 mL/Hr) IV Continuous <Continuous>  dextrose 50% Injectable 50 milliLiter(s) IV Push every 15 minutes  dextrose 50% Injectable 25 milliLiter(s) IV Push every 15 minutes  glucagon  Injectable 1 milliGRAM(s) IntraMuscular once  insulin lispro (ADMELOG) corrective regimen sliding scale   SubCutaneous every 6 hours  lacosamide IVPB 100 milliGRAM(s) IV Intermittent every 12 hours  pantoprazole  Injectable 40 milliGRAM(s) IV Push daily  phenylephrine    Infusion 0.2 MICROgram(s)/kG/Min (5.78 mL/Hr) IV Continuous <Continuous>  piperacillin/tazobactam IVPB.. 3.375 Gram(s) IV Intermittent every 8 hours  propofol Infusion 10 MICROgram(s)/kG/Min (4.63 mL/Hr) IV Continuous <Continuous>  sodium chloride 0.9%. 1000 milliLiter(s) (10 mL/Hr) IV Continuous <Continuous>  valproate sodium  IVPB 750 milliGRAM(s) IV Intermittent every 12 hours    MEDICATIONS  (PRN):  acetaminophen   IVPB .. 1000 milliGRAM(s) IV Intermittent once PRN Mild Pain (1 - 3)  dextrose Oral Gel 15 Gram(s) Oral once PRN Blood Glucose LESS THAN 70 milliGRAM(s)/deciliter  fentaNYL    Injectable 25 MICROGram(s) IV Push every 3 hours PRN Severe Pain (7 - 10)       PROBLEM LIST/ ASSESSMENT:  HEALTH ISSUES - PROBLEM Dx:      ,   Patient is a 53y old  Male who presents with a chief complaint of expanding aortic arch and descending aortic aneurysm (27 Mar 2023 12:10)     s/p cardiac surgery                My plan includes :  close hemodynamic, ventilatory and drain monitoring and management per post op routine    Monitor for arrhythmias and monitor parameters for organ perfusion  beta blockade not administered due to hemodynamic instability and bradycardia  monitor neurologic status  Head of the bed should remain elevated to 45 deg .   chest PT and IS will be encouraged  monitor adequacy of oxygenation and ventilation and attempt to wean oxygen  antibiotic regimen will be tailored to the clinical, laboratory and microbiologic data  Nutritional goals will be met using po eventually , ensure adequate caloric intake and montior the same  Stress ulcer and VTE prophylaxis will be achieved    Glycemic control is satisfactory  Electrolytes have been repleted as necessary and wound care has been carried out. Pain control has been achieved.   agressive physical therapy and early mobility and ambulation goals will be met   The family was updated about the course and plan  CRITICAL CARE TIME personally provided by me  in evaluation and management, reassessments, review and interpretation of labs and x-rays, ventilator and hemodynamic management, formulating a plan and coordinating care: __110___ MIN.  Time does not include procedural time. Time spent was non routine post-operarive caRE and included multiple and repeated evaluations at the bedside  CTICU ATTENDING     					    Bubba Craft MD

## 2023-03-28 LAB
ALBUMIN SERPL ELPH-MCNC: 2.3 G/DL — LOW (ref 3.3–5)
ALBUMIN SERPL ELPH-MCNC: 2.5 G/DL — LOW (ref 3.3–5)
ALBUMIN SERPL ELPH-MCNC: 2.6 G/DL — LOW (ref 3.3–5)
ALP SERPL-CCNC: 62 U/L — SIGNIFICANT CHANGE UP (ref 40–120)
ALP SERPL-CCNC: 76 U/L — SIGNIFICANT CHANGE UP (ref 40–120)
ALP SERPL-CCNC: 82 U/L — SIGNIFICANT CHANGE UP (ref 40–120)
ALT FLD-CCNC: 8 U/L — LOW (ref 10–45)
ALT FLD-CCNC: 9 U/L — LOW (ref 10–45)
ALT FLD-CCNC: 9 U/L — LOW (ref 10–45)
ANION GAP SERPL CALC-SCNC: 11 MMOL/L — SIGNIFICANT CHANGE UP (ref 5–17)
ANION GAP SERPL CALC-SCNC: 12 MMOL/L — SIGNIFICANT CHANGE UP (ref 5–17)
ANION GAP SERPL CALC-SCNC: 16 MMOL/L — SIGNIFICANT CHANGE UP (ref 5–17)
APTT BLD: 28.4 SEC — SIGNIFICANT CHANGE UP (ref 27.5–35.5)
APTT BLD: 28.6 SEC — SIGNIFICANT CHANGE UP (ref 27.5–35.5)
APTT BLD: 29.3 SEC — SIGNIFICANT CHANGE UP (ref 27.5–35.5)
AST SERPL-CCNC: 25 U/L — SIGNIFICANT CHANGE UP (ref 10–40)
AST SERPL-CCNC: 25 U/L — SIGNIFICANT CHANGE UP (ref 10–40)
AST SERPL-CCNC: 27 U/L — SIGNIFICANT CHANGE UP (ref 10–40)
BILIRUB SERPL-MCNC: 0.9 MG/DL — SIGNIFICANT CHANGE UP (ref 0.2–1.2)
BILIRUB SERPL-MCNC: 1 MG/DL — SIGNIFICANT CHANGE UP (ref 0.2–1.2)
BILIRUB SERPL-MCNC: 1.2 MG/DL — SIGNIFICANT CHANGE UP (ref 0.2–1.2)
BLD GP AB SCN SERPL QL: NEGATIVE — SIGNIFICANT CHANGE UP
BUN SERPL-MCNC: 58 MG/DL — HIGH (ref 7–23)
BUN SERPL-MCNC: 66 MG/DL — HIGH (ref 7–23)
BUN SERPL-MCNC: 67 MG/DL — HIGH (ref 7–23)
CALCIUM SERPL-MCNC: 8 MG/DL — LOW (ref 8.4–10.5)
CALCIUM SERPL-MCNC: 8.2 MG/DL — LOW (ref 8.4–10.5)
CALCIUM SERPL-MCNC: 8.6 MG/DL — SIGNIFICANT CHANGE UP (ref 8.4–10.5)
CHLORIDE SERPL-SCNC: 101 MMOL/L — SIGNIFICANT CHANGE UP (ref 96–108)
CHLORIDE SERPL-SCNC: 101 MMOL/L — SIGNIFICANT CHANGE UP (ref 96–108)
CHLORIDE SERPL-SCNC: 102 MMOL/L — SIGNIFICANT CHANGE UP (ref 96–108)
CK MB CFR SERPL CALC: 1.4 NG/ML — SIGNIFICANT CHANGE UP (ref 0–6.7)
CK SERPL-CCNC: 196 U/L — SIGNIFICANT CHANGE UP (ref 30–200)
CO2 SERPL-SCNC: 22 MMOL/L — SIGNIFICANT CHANGE UP (ref 22–31)
CO2 SERPL-SCNC: 24 MMOL/L — SIGNIFICANT CHANGE UP (ref 22–31)
CO2 SERPL-SCNC: 24 MMOL/L — SIGNIFICANT CHANGE UP (ref 22–31)
CREAT SERPL-MCNC: 2.94 MG/DL — HIGH (ref 0.5–1.3)
CREAT SERPL-MCNC: 3.08 MG/DL — HIGH (ref 0.5–1.3)
CREAT SERPL-MCNC: 3.19 MG/DL — HIGH (ref 0.5–1.3)
CULTURE RESULTS: SIGNIFICANT CHANGE UP
CULTURE RESULTS: SIGNIFICANT CHANGE UP
EGFR: 22 ML/MIN/1.73M2 — LOW
EGFR: 23 ML/MIN/1.73M2 — LOW
EGFR: 25 ML/MIN/1.73M2 — LOW
GAS PNL BLDA: SIGNIFICANT CHANGE UP
GLUCOSE BLDC GLUCOMTR-MCNC: 113 MG/DL — HIGH (ref 70–99)
GLUCOSE BLDC GLUCOMTR-MCNC: 130 MG/DL — HIGH (ref 70–99)
GLUCOSE BLDC GLUCOMTR-MCNC: 142 MG/DL — HIGH (ref 70–99)
GLUCOSE BLDC GLUCOMTR-MCNC: 144 MG/DL — HIGH (ref 70–99)
GLUCOSE BLDC GLUCOMTR-MCNC: 64 MG/DL — LOW (ref 70–99)
GLUCOSE BLDC GLUCOMTR-MCNC: 95 MG/DL — SIGNIFICANT CHANGE UP (ref 70–99)
GLUCOSE SERPL-MCNC: 133 MG/DL — HIGH (ref 70–99)
GLUCOSE SERPL-MCNC: 153 MG/DL — HIGH (ref 70–99)
GLUCOSE SERPL-MCNC: 158 MG/DL — HIGH (ref 70–99)
HCT VFR BLD CALC: 25.8 % — LOW (ref 39–50)
HCT VFR BLD CALC: 28.5 % — LOW (ref 39–50)
HCT VFR BLD CALC: 29.7 % — LOW (ref 39–50)
HGB BLD-MCNC: 10 G/DL — LOW (ref 13–17)
HGB BLD-MCNC: 10.3 G/DL — LOW (ref 13–17)
HGB BLD-MCNC: 8.8 G/DL — LOW (ref 13–17)
INR BLD: 1.27 — HIGH (ref 0.88–1.16)
INR BLD: 1.28 — HIGH (ref 0.88–1.16)
INR BLD: 1.32 — HIGH (ref 0.88–1.16)
LACTATE SERPL-SCNC: 1.2 MMOL/L — SIGNIFICANT CHANGE UP (ref 0.5–2)
LACTATE SERPL-SCNC: 1.3 MMOL/L — SIGNIFICANT CHANGE UP (ref 0.5–2)
MAGNESIUM SERPL-MCNC: 1.9 MG/DL — SIGNIFICANT CHANGE UP (ref 1.6–2.6)
MAGNESIUM SERPL-MCNC: 1.9 MG/DL — SIGNIFICANT CHANGE UP (ref 1.6–2.6)
MAGNESIUM SERPL-MCNC: 2 MG/DL — SIGNIFICANT CHANGE UP (ref 1.6–2.6)
MCHC RBC-ENTMCNC: 30.7 PG — SIGNIFICANT CHANGE UP (ref 27–34)
MCHC RBC-ENTMCNC: 30.9 PG — SIGNIFICANT CHANGE UP (ref 27–34)
MCHC RBC-ENTMCNC: 31.2 PG — SIGNIFICANT CHANGE UP (ref 27–34)
MCHC RBC-ENTMCNC: 34.1 GM/DL — SIGNIFICANT CHANGE UP (ref 32–36)
MCHC RBC-ENTMCNC: 34.7 GM/DL — SIGNIFICANT CHANGE UP (ref 32–36)
MCHC RBC-ENTMCNC: 35.1 GM/DL — SIGNIFICANT CHANGE UP (ref 32–36)
MCV RBC AUTO: 88.4 FL — SIGNIFICANT CHANGE UP (ref 80–100)
MCV RBC AUTO: 88.8 FL — SIGNIFICANT CHANGE UP (ref 80–100)
MCV RBC AUTO: 90.5 FL — SIGNIFICANT CHANGE UP (ref 80–100)
NRBC # BLD: 0 /100 WBCS — SIGNIFICANT CHANGE UP (ref 0–0)
NRBC # BLD: 1 /100 WBCS — HIGH (ref 0–0)
NRBC # BLD: 1 /100 WBCS — HIGH (ref 0–0)
PHOSPHATE SERPL-MCNC: 4.2 MG/DL — SIGNIFICANT CHANGE UP (ref 2.5–4.5)
PHOSPHATE SERPL-MCNC: 4.3 MG/DL — SIGNIFICANT CHANGE UP (ref 2.5–4.5)
PHOSPHATE SERPL-MCNC: 4.3 MG/DL — SIGNIFICANT CHANGE UP (ref 2.5–4.5)
PLATELET # BLD AUTO: 76 K/UL — LOW (ref 150–400)
PLATELET # BLD AUTO: 81 K/UL — LOW (ref 150–400)
PLATELET # BLD AUTO: 90 K/UL — LOW (ref 150–400)
POTASSIUM SERPL-MCNC: 3.1 MMOL/L — LOW (ref 3.5–5.3)
POTASSIUM SERPL-MCNC: 3.2 MMOL/L — LOW (ref 3.5–5.3)
POTASSIUM SERPL-MCNC: 3.3 MMOL/L — LOW (ref 3.5–5.3)
POTASSIUM SERPL-SCNC: 3.1 MMOL/L — LOW (ref 3.5–5.3)
POTASSIUM SERPL-SCNC: 3.2 MMOL/L — LOW (ref 3.5–5.3)
POTASSIUM SERPL-SCNC: 3.3 MMOL/L — LOW (ref 3.5–5.3)
PROT SERPL-MCNC: 4.4 G/DL — LOW (ref 6–8.3)
PROT SERPL-MCNC: 4.8 G/DL — LOW (ref 6–8.3)
PROT SERPL-MCNC: 5.3 G/DL — LOW (ref 6–8.3)
PROTHROM AB SERPL-ACNC: 15.1 SEC — HIGH (ref 10.5–13.4)
PROTHROM AB SERPL-ACNC: 15.3 SEC — HIGH (ref 10.5–13.4)
PROTHROM AB SERPL-ACNC: 15.8 SEC — HIGH (ref 10.5–13.4)
RBC # BLD: 2.85 M/UL — LOW (ref 4.2–5.8)
RBC # BLD: 3.21 M/UL — LOW (ref 4.2–5.8)
RBC # BLD: 3.36 M/UL — LOW (ref 4.2–5.8)
RBC # FLD: 15.6 % — HIGH (ref 10.3–14.5)
RBC # FLD: 15.7 % — HIGH (ref 10.3–14.5)
RBC # FLD: 15.8 % — HIGH (ref 10.3–14.5)
RH IG SCN BLD-IMP: POSITIVE — SIGNIFICANT CHANGE UP
SODIUM SERPL-SCNC: 137 MMOL/L — SIGNIFICANT CHANGE UP (ref 135–145)
SODIUM SERPL-SCNC: 137 MMOL/L — SIGNIFICANT CHANGE UP (ref 135–145)
SODIUM SERPL-SCNC: 139 MMOL/L — SIGNIFICANT CHANGE UP (ref 135–145)
SPECIMEN SOURCE: SIGNIFICANT CHANGE UP
SPECIMEN SOURCE: SIGNIFICANT CHANGE UP
TROPONIN T SERPL-MCNC: 2.23 NG/ML — CRITICAL HIGH (ref 0–0.01)
TROPONIN T SERPL-MCNC: 2.6 NG/ML — CRITICAL HIGH (ref 0–0.01)
WBC # BLD: 17.59 K/UL — HIGH (ref 3.8–10.5)
WBC # BLD: 19.23 K/UL — HIGH (ref 3.8–10.5)
WBC # BLD: 19.72 K/UL — HIGH (ref 3.8–10.5)
WBC # FLD AUTO: 17.59 K/UL — HIGH (ref 3.8–10.5)
WBC # FLD AUTO: 19.23 K/UL — HIGH (ref 3.8–10.5)
WBC # FLD AUTO: 19.72 K/UL — HIGH (ref 3.8–10.5)

## 2023-03-28 RX ORDER — DEXTROSE 50 % IN WATER 50 %
50 SYRINGE (ML) INTRAVENOUS
Refills: 0 | Status: DISCONTINUED | OUTPATIENT
Start: 2023-03-28 | End: 2023-03-28

## 2023-03-28 RX ORDER — CALCIUM GLUCONATE 100 MG/ML
2 VIAL (ML) INTRAVENOUS ONCE
Refills: 0 | Status: COMPLETED | OUTPATIENT
Start: 2023-03-28 | End: 2023-03-28

## 2023-03-28 RX ORDER — POTASSIUM CHLORIDE 20 MEQ
20 PACKET (EA) ORAL ONCE
Refills: 0 | Status: COMPLETED | OUTPATIENT
Start: 2023-03-28 | End: 2023-03-28

## 2023-03-28 RX ORDER — FENTANYL CITRATE 50 UG/ML
50 INJECTION INTRAVENOUS ONCE
Refills: 0 | Status: DISCONTINUED | OUTPATIENT
Start: 2023-03-28 | End: 2023-03-28

## 2023-03-28 RX ORDER — ALBUMIN HUMAN 25 %
250 VIAL (ML) INTRAVENOUS ONCE
Refills: 0 | Status: COMPLETED | OUTPATIENT
Start: 2023-03-28 | End: 2023-03-28

## 2023-03-28 RX ORDER — ASPIRIN/CALCIUM CARB/MAGNESIUM 324 MG
81 TABLET ORAL DAILY
Refills: 0 | Status: DISCONTINUED | OUTPATIENT
Start: 2023-03-28 | End: 2023-04-05

## 2023-03-28 RX ORDER — PHENYLEPHRINE HYDROCHLORIDE 10 MG/ML
0.1 INJECTION INTRAVENOUS
Qty: 40 | Refills: 0 | Status: DISCONTINUED | OUTPATIENT
Start: 2023-03-28 | End: 2023-03-29

## 2023-03-28 RX ORDER — ASPIRIN/CALCIUM CARB/MAGNESIUM 324 MG
81 TABLET ORAL DAILY
Refills: 0 | Status: DISCONTINUED | OUTPATIENT
Start: 2023-03-28 | End: 2023-03-29

## 2023-03-28 RX ORDER — BUMETANIDE 0.25 MG/ML
2 INJECTION INTRAMUSCULAR; INTRAVENOUS EVERY 12 HOURS
Refills: 0 | Status: DISCONTINUED | OUTPATIENT
Start: 2023-03-28 | End: 2023-04-01

## 2023-03-28 RX ORDER — ALBUMIN HUMAN 25 %
500 VIAL (ML) INTRAVENOUS ONCE
Refills: 0 | Status: DISCONTINUED | OUTPATIENT
Start: 2023-03-28 | End: 2023-03-28

## 2023-03-28 RX ADMIN — PIPERACILLIN AND TAZOBACTAM 25 GRAM(S): 4; .5 INJECTION, POWDER, LYOPHILIZED, FOR SOLUTION INTRAVENOUS at 13:35

## 2023-03-28 RX ADMIN — Medication 20 MILLILITER(S): at 08:30

## 2023-03-28 RX ADMIN — AMIODARONE HYDROCHLORIDE 400 MILLIGRAM(S): 400 TABLET ORAL at 05:55

## 2023-03-28 RX ADMIN — DEXMEDETOMIDINE HYDROCHLORIDE IN 0.9% SODIUM CHLORIDE 7.71 MICROGRAM(S)/KG/HR: 4 INJECTION INTRAVENOUS at 05:56

## 2023-03-28 RX ADMIN — DEXMEDETOMIDINE HYDROCHLORIDE IN 0.9% SODIUM CHLORIDE 7.71 MICROGRAM(S)/KG/HR: 4 INJECTION INTRAVENOUS at 12:59

## 2023-03-28 RX ADMIN — Medication 1000 MILLIGRAM(S): at 18:00

## 2023-03-28 RX ADMIN — SODIUM CHLORIDE 10 MILLILITER(S): 9 INJECTION INTRAMUSCULAR; INTRAVENOUS; SUBCUTANEOUS at 08:30

## 2023-03-28 RX ADMIN — CHLORHEXIDINE GLUCONATE 1 APPLICATION(S): 213 SOLUTION TOPICAL at 05:56

## 2023-03-28 RX ADMIN — Medication 200 GRAM(S): at 18:35

## 2023-03-28 RX ADMIN — PIPERACILLIN AND TAZOBACTAM 25 GRAM(S): 4; .5 INJECTION, POWDER, LYOPHILIZED, FOR SOLUTION INTRAVENOUS at 05:55

## 2023-03-28 RX ADMIN — Medication 57.5 MILLIGRAM(S): at 06:01

## 2023-03-28 RX ADMIN — PIPERACILLIN AND TAZOBACTAM 25 GRAM(S): 4; .5 INJECTION, POWDER, LYOPHILIZED, FOR SOLUTION INTRAVENOUS at 22:20

## 2023-03-28 RX ADMIN — Medication 100 MILLIEQUIVALENT(S): at 04:53

## 2023-03-28 RX ADMIN — BUMETANIDE 2 MILLIGRAM(S): 0.25 INJECTION INTRAMUSCULAR; INTRAVENOUS at 02:53

## 2023-03-28 RX ADMIN — FENTANYL CITRATE 25 MICROGRAM(S): 50 INJECTION INTRAVENOUS at 18:00

## 2023-03-28 RX ADMIN — Medication 57.5 MILLIGRAM(S): at 19:46

## 2023-03-28 RX ADMIN — Medication 81 MILLIGRAM(S): at 17:22

## 2023-03-28 RX ADMIN — Medication 400 MILLIGRAM(S): at 16:53

## 2023-03-28 RX ADMIN — FENTANYL CITRATE 25 MICROGRAM(S): 50 INJECTION INTRAVENOUS at 22:19

## 2023-03-28 RX ADMIN — FENTANYL CITRATE 50 MICROGRAM(S): 50 INJECTION INTRAVENOUS at 10:00

## 2023-03-28 RX ADMIN — FENTANYL CITRATE 25 MICROGRAM(S): 50 INJECTION INTRAVENOUS at 05:55

## 2023-03-28 RX ADMIN — PROPOFOL 4.63 MICROGRAM(S)/KG/MIN: 10 INJECTION, EMULSION INTRAVENOUS at 07:05

## 2023-03-28 RX ADMIN — LACOSAMIDE 120 MILLIGRAM(S): 50 TABLET ORAL at 19:15

## 2023-03-28 RX ADMIN — Medication 200 GRAM(S): at 04:19

## 2023-03-28 RX ADMIN — CHLORHEXIDINE GLUCONATE 15 MILLILITER(S): 213 SOLUTION TOPICAL at 17:33

## 2023-03-28 RX ADMIN — CHLORHEXIDINE GLUCONATE 15 MILLILITER(S): 213 SOLUTION TOPICAL at 05:56

## 2023-03-28 RX ADMIN — FENTANYL CITRATE 25 MICROGRAM(S): 50 INJECTION INTRAVENOUS at 17:33

## 2023-03-28 RX ADMIN — FENTANYL CITRATE 25 MICROGRAM(S): 50 INJECTION INTRAVENOUS at 22:35

## 2023-03-28 RX ADMIN — FENTANYL CITRATE 25 MICROGRAM(S): 50 INJECTION INTRAVENOUS at 06:15

## 2023-03-28 RX ADMIN — Medication 125 MILLILITER(S): at 10:00

## 2023-03-28 RX ADMIN — FENTANYL CITRATE 25 MICROGRAM(S): 50 INJECTION INTRAVENOUS at 12:00

## 2023-03-28 RX ADMIN — FENTANYL CITRATE 25 MICROGRAM(S): 50 INJECTION INTRAVENOUS at 12:30

## 2023-03-28 RX ADMIN — BUMETANIDE 2 MILLIGRAM(S): 0.25 INJECTION INTRAMUSCULAR; INTRAVENOUS at 15:18

## 2023-03-28 RX ADMIN — LACOSAMIDE 120 MILLIGRAM(S): 50 TABLET ORAL at 07:28

## 2023-03-28 RX ADMIN — Medication 125 MILLILITER(S): at 11:00

## 2023-03-28 RX ADMIN — Medication 100 MILLIEQUIVALENT(S): at 17:34

## 2023-03-28 RX ADMIN — FENTANYL CITRATE 50 MICROGRAM(S): 50 INJECTION INTRAVENOUS at 09:30

## 2023-03-28 RX ADMIN — PANTOPRAZOLE SODIUM 40 MILLIGRAM(S): 20 TABLET, DELAYED RELEASE ORAL at 13:01

## 2023-03-28 NOTE — PHYSICAL THERAPY INITIAL EVALUATION ADULT - FOLLOWS COMMANDS/ANSWERS QUESTIONS, REHAB EVAL
Became minimally agitated toward end of session with decreased command following/75% of the time/able to follow single-step instructions

## 2023-03-28 NOTE — OCCUPATIONAL THERAPY INITIAL EVALUATION ADULT - ADDITIONAL COMMENTS
Patient with ETT and intubated, patient wife present and able to provide social history and PLOF. Patient lives with wife in a private home with 4 BRENNON. Patient was independent with all ADL's, IADL's and functional mobility with no AD prior to admission. Patient has a shower tub shower and is R hand dominant. Patient able to give thumbs up to confirm information provided by wife.

## 2023-03-28 NOTE — PROGRESS NOTE ADULT - ASSESSMENT
54 yo M w/ HTN, seizure disorder and no underlying CKD who is s/p scheduled replacement of aortic arch and TEVAR on 3/20. Prolonger surgery with significant amount of blood products and fluids received intra-op. Post-op pt intubated, on pressors, with severe acidosis. Started on CVVHD 3/21 for refractory acidosis, and was then continued for vol removal, tolerated CVVHD without any complications, dc'ed on 3/24 as became more hemodynamically stable. Started on diuresis 3/24 with good response, currently on Bumex 2mg Q8hr. Session of iHD 3/27 for vol removal      #Non-oliguric iATN due to intra-op hemodynamic changes (pt requiring 7 units pRBC intra-op and post-op pt in shock. Shock now resolved)  BCr 1.2, eGFR 67 (3/9)  UA w/ trace proteinuria and large blood w/o RBCs. [id number]  CVVHD dc'ed 3/24  iHD 3/27; 3.3 L removed (originally plan was for 3L). Pt with low BPs at the end of the treatment  Pt still diuresing well w/ Bumex 2mg Q8. However, drops in hourly UO when the effect of Bumex wears off, also UO decreased from ~4L (3/27) to 2L (3/28). Paired with tachycardia, it can be a sign of intravascular vol depletion. Discussed with CTICU team, and agree w/ albumin 5% today to help mobilize fluid from interstitium to intravascular space. If UO continues to drop, may consider lowering diuresis to prevent another ATN insult  No indication of HD today. Will continue to monitor  Access; Rt IJ S/Q HD cath placed 3/27  Strict I&Os  BMP per icu protocol      Plan discussed with CTICU team

## 2023-03-28 NOTE — PHYSICAL THERAPY INITIAL EVALUATION ADULT - MANUAL MUSCLE TESTING RESULTS, REHAB EVAL
BLE 3/5, L  5/5, R  4/5, b/l elbow flexion/extension 3/5, b/l sh flexion 2+/5 limited by sternal precautions

## 2023-03-28 NOTE — PHYSICAL THERAPY INITIAL EVALUATION ADULT - BED MOBILITY LIMITATIONS, REHAB EVAL
Sat at edge of bed x ~8 minutes with initial min A x 1 for trunk control, progressed to mod-maxA x 1 as pt fatigued/decreased ability to use arms for pushing/pulling/decreased ability to use legs for bridging/pushing/impaired ability to control trunk for mobility

## 2023-03-28 NOTE — PHYSICAL THERAPY INITIAL EVALUATION ADULT - PERTINENT HX OF CURRENT PROBLEM, REHAB EVAL
52 year old male, current every day marijuana use with a past medical hx of HTN, type A aortic dissection s/p Dacron grafts, AV resuspension in 2013,CAD s/p CABG x 1 SVG to RCA (5/2013 with Dr. Medina), seizure disorder (last episode on 7/4/22) presents for a follow up visit for evaluation and management of his aortic pathology. Patient is referred by Dr. Jacqueline Gonsales. Patient underwent replacement of transverse aortic arch, second stage thoracic endovascular aortic repair, aorto-axillary bypass, AV replacement, and CABG x 1 with Dr. Pierre 3/20.

## 2023-03-28 NOTE — OCCUPATIONAL THERAPY INITIAL EVALUATION ADULT - GENERAL OBSERVATIONS, REHAB EVAL
PT Daniella, PT Asha, RT Madhavi present. Patient received semisupine in bed likes to be called "Ramiro", head/neck rotated to the L towards vent, +tele, +IV, +ETT to vent lip line at 25 before/after, CMV, FIO2 40%, PEEP 5, +central line, +NGT (feed disconnected), +HD Catheter, +black to wall suction x 3, +CT to wall suction x 2, +coulter cath, +TPM, +b/l CAIRE boots, +b/l SCD's, +sternal incision C/D/I, NAD.

## 2023-03-28 NOTE — PROGRESS NOTE ADULT - SUBJECTIVE AND OBJECTIVE BOX
CTICU  CRITICAL  CARE  attending     Hand off received 					   Pertinent clinical, laboratory, radiographic, hemodynamic, echocardiographic, respiratory data, microbiologic data and chart were reviewed and analyzed frequently throughout the course of the day  Patient seen and examined with CTS/ SH attending at bedside  Pt is a 53yr old male with PMH HTN, type A dissection sp Dacron grafts and AV resuspension (), CAD sp CABG x 1 ([facility], 2013, SVG-RCA), seizures, admitted for surgical mgmt of progression of aneurysmal disease. Patient underwent replacement of transverse aortic arch, second stage thoracic endovascular aortic repair, aorto-axillary bypass, AV replacement (bio 23mm), and CABG x 1 (SVG-RCA) EF 75% with Dr. Pierre 3/20. Patient received 7 units pRBC, 6 FFP, 4 plt, 15 cryo, 1000 FEIBA, and uo 1300cc. Started on lasix infusion and phenylephrine, bicarb, Armaan, levo and vaso, with lactic acidosis. 3/21 L femoral HD cath placed and CVVHD started with improvement in acidosis. Pressors off by end of day. Primacor weaned overnight. Patient woke up and follows commands, exhibited facial twitching cw prior seizures per wife and ativan given followed by vEEG placement. Neurology consulted for recs given subtherapeutic AED levels. Given 2 units pRBC. 3/22 Neurology recommended dc vEEG given low suspicion for seizures. Fluid removal initiated with CVVHD. Overnight poor oxygenation requiring bronchoscopy, on laquita and vaso, D10 started for hypoglycemia. 3/23 large residuals, reglan started. CTH performed for poor mentation-no bleed. Amio given for afib. Received 1 unit pRBC and 1 plt, femoral a line removed. CVVHD stopped and diuretic initiated with good response. Primacor turned off. 3/25 new TLC and R cordis with swan placed, tolerated cpap. D10 for hypoglycemia. Underwent aquaphoresis via new LIJ HD cath. Low grade fevers. Overnight swan dc'd. 3/27New RIJ HDC placed for malfunctioning LIJ, underwent HD with 3.5L removed. Junctional rhythm overnight.     FAMILY HISTORY:  No pertinent family history in first degree relatives  PAST MEDICAL & SURGICAL HISTORY:  HTN (hypertension)  Aortic dissection  CAD (coronary artery disease)  Seizure disorder  S/P aortic bifurcation bypass graft  S/P CABG x 1        Patient is a 53y old  Male who presents with a chief complaint of expanding aortic arch and descending aortic aneurysm.    14 system review was unremarkable    Vital signs, hemodynamic and respiratory parameters were reviewed from the bedside nursing flowsheet.  ICU Vital Signs Last 24 Hrs  T(C): 36.7 (28 Mar 2023 09:01), Max: 37 (28 Mar 2023 05:07)  T(F): 98.1 (28 Mar 2023 09:01), Max: 98.6 (28 Mar 2023 05:07)  HR: 113 (28 Mar 2023 09:15) (64 - 113)  BP: --  BP(mean): --  ABP: 160/77 (28 Mar 2023 09:15) (92/64 - 160/77)  ABP(mean): 97 (28 Mar 2023 09:15) (65 - 102)  RR: 18 (28 Mar 2023 09:00) (12 - 22)  SpO2: 94% (28 Mar 2023 09:00) (90% - 100%)    O2 Parameters below as of 28 Mar 2023 10:00  Patient On (Oxygen Delivery Method): CPAP, via ventilator           Adult Advanced Hemodynamics Last 24 Hrs  CVP(mm Hg): 9 (28 Mar 2023 09:15) (2 - 36)  CVP(cm H2O): --  CO: --  CI: --  PA: --  PA(mean): --  PCWP: --  SVR: --  SVRI: --  PVR: --  PVRI: --, ABG - ( 28 Mar 2023 02:08 )  pH, Arterial: 7.48  pH, Blood: x     /  pCO2: 34    /  pO2: 96    / HCO3: 25    / Base Excess: 2.0   /  SaO2: 99.0              Mode: AC/ CMV (Assist Control/ Continuous Mandatory Ventilation)  RR (machine): 12  TV (machine): 650  FiO2: 40  PEEP: 5  ITime: 1  MAP: 9  PIP: 28    Intake and output was reviewed and the fluid balance was calculated  Daily     Daily Weight in k.8 (27 Mar 2023 20:05)  I&O's Summary    27 Mar 2023 07:01  -  28 Mar 2023 07:00  --------------------------------------------------------  IN: 3396.1 mL / OUT: 6125 mL / NET: -2728.9 mL    28 Mar 2023 07:01  -  28 Mar 2023 10:07  --------------------------------------------------------  IN: 287 mL / OUT: 435 mL / NET: -148 mL        All lines and drain sites were assessed  Glycemic trend was reviewed    POCT Blood Glucose.: 95 mg/dL (28 Mar 2023 07:11)    Neuro: follows commands  HEENT: ett  Heart: s1 s2   Lungs: clear bl  Abdomen: soft nt nd  Extremities: 2+dp    Lines:  RIJ TLC 3/25  RIJ HD cath 3/27  R radial arterial line 3/20    Tubes:  Med bella x 3  Pleural bella x 2      labs  CBC Full  -  ( 28 Mar 2023 02:08 )  WBC Count : 17.59 K/uL  RBC Count : 3.21 M/uL  Hemoglobin : 10.0 g/dL  Hematocrit : 28.5 %  Platelet Count - Automated : 81 K/uL  Mean Cell Volume : 88.8 fl  Mean Cell Hemoglobin : 31.2 pg  Mean Cell Hemoglobin Concentration : 35.1 gm/dL  Auto Neutrophil # : x  Auto Lymphocyte # : x  Auto Monocyte # : x  Auto Eosinophil # : x  Auto Basophil # : x  Auto Neutrophil % : x  Auto Lymphocyte % : x  Auto Monocyte % : x  Auto Eosinophil % : x  Auto Basophil % : x        137  |  101  |  58<H>  ----------------------------<  133<H>  3.2<L>   |  24  |  2.94<H>    Ca    8.0<L>      28 Mar 2023 02:08  Phos  4.3     -  Mg     2.0         TPro  4.8<L>  /  Alb  2.3<L>  /  TBili  0.9  /  DBili  x   /  AST  27  /  ALT  9<L>  /  AlkPhos  76  -    PT/INR - ( 28 Mar 2023 02:08 )   PT: 15.3 sec;   INR: 1.28          PTT - ( 28 Mar 2023 02:08 )  PTT:28.4 sec  The current medications were reviewed   MEDICATIONS  (STANDING):  aspirin  chewable 81 milliGRAM(s) Enteral Tube daily  buMETAnide Injectable 2 milliGRAM(s) IV Push every 8 hours  chlorhexidine 0.12% Liquid 15 milliLiter(s) Oral Mucosa every 12 hours  chlorhexidine 2% Cloths 1 Application(s) Topical daily  dexMEDEtomidine Infusion 0.4 MICROgram(s)/kG/Hr (7.71 mL/Hr) IV Continuous <Continuous>  dextrose 10%. 1000 milliLiter(s) (20 mL/Hr) IV Continuous <Continuous>  dextrose 5%. 1000 milliLiter(s) (50 mL/Hr) IV Continuous <Continuous>  dextrose 5%. 1000 milliLiter(s) (100 mL/Hr) IV Continuous <Continuous>  dextrose 50% Injectable 50 milliLiter(s) IV Push every 15 minutes  dextrose 50% Injectable 25 milliLiter(s) IV Push every 15 minutes  glucagon  Injectable 1 milliGRAM(s) IntraMuscular once  insulin lispro (ADMELOG) corrective regimen sliding scale   SubCutaneous every 6 hours  lacosamide IVPB 100 milliGRAM(s) IV Intermittent every 12 hours  pantoprazole  Injectable 40 milliGRAM(s) IV Push daily  phenylephrine    Infusion 0.1 MICROgram(s)/kG/Min (2.89 mL/Hr) IV Continuous <Continuous>  piperacillin/tazobactam IVPB.. 3.375 Gram(s) IV Intermittent every 8 hours  propofol Infusion 10 MICROgram(s)/kG/Min (4.63 mL/Hr) IV Continuous <Continuous>  sodium chloride 0.9%. 1000 milliLiter(s) (10 mL/Hr) IV Continuous <Continuous>  valproate sodium  IVPB 750 milliGRAM(s) IV Intermittent every 12 hours    MEDICATIONS  (PRN):  acetaminophen   IVPB .. 1000 milliGRAM(s) IV Intermittent once PRN Mild Pain (1 - 3)  dextrose Oral Gel 15 Gram(s) Oral once PRN Blood Glucose LESS THAN 70 milliGRAM(s)/deciliter  fentaNYL    Injectable 25 MICROGram(s) IV Push every 3 hours PRN Severe Pain (7 - 10)      Assessment/Plan:  53yr old male with PMH HTN, 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     POD8 replacement of transverse aortic arch, second stage thoracic endovascular aortic repair, aorto-axillary bypass, AV replacement (bio 23mm), and CABG x 1 (SVG-RCA) (EF 75%, [facility], 3/20)  RASS goal 0/-1, wean sedation to assess neuro status  Acute respiratory failure requiring mechanical ventilation-cpap trials  ATN  Diuresing on bumex-will switch to q12 for now  Serratia in BAL, on zosyn since 3/26, would do 7d course  Continue AEDs  Acute post operative anemia-trend H/H  Thrombocytopenia-monitor  HIT neg, follow NATALYA  Replete lytes prn  Monitor CT output  Tube feeds, increase to goal as tolerated  GI/DVT PPX  Bowel Regimen  Pain control  PT  Close hemodynamic, ventilatory and drain monitoring and management per post op routine  Monitor for arrhythmias and monitor parameters for organ perfusion  Beta blockade not administered due to hemodynamic instability and bradycardia  Monitor neurologic status  Head of the bed should remain elevated to 45 deg   Chest PT and IS will be encouraged  Monitor adequacy of oxygenation and ventilation and attempt to wean oxygen  Antibiotic regimen will be tailored to the clinical, laboratory and microbiologic data  Nutritional goals will be met using po eventually, ensure adequate caloric intake and monitor the same  Stress ulcer and VTE prophylaxis will be achieved    Glycemic control is satisfactory  Electrolytes have been repleted as necessary and wound care has been carried out   Pain control has been achieved.   Aggressive physical therapy and early mobility and ambulation goals will be met   The family was updated about the course and plan.    CRITICAL CARE TIME personally provided by me  in evaluation and management, reassessments, review and interpretation of labs and x-rays, ventilator and hemodynamic management, formulating a plan and coordinating care: ___105____ MIN.  Time does not include procedural time.    CTICU ATTENDING     					  Alexx Brooks MD

## 2023-03-28 NOTE — PROGRESS NOTE ADULT - SUBJECTIVE AND OBJECTIVE BOX
Patient is a 53y Male seen and evaluated at bedside. Remains intubated though awake and responsive. HD done last night, 3.3 L removed with drop in BP to 110s. Was on phenylephrine overnight, now weaned off      Meds:    acetaminophen   IVPB .. 1000 once PRN  albumin human  5% IVPB 500 once  aspirin  chewable 81 daily  chlorhexidine 0.12% Liquid 15 every 12 hours  chlorhexidine 2% Cloths 1 daily  dexMEDEtomidine Infusion 0.4 <Continuous>  dextrose 10%. 1000 <Continuous>  dextrose 5%. 1000 <Continuous>  dextrose 5%. 1000 <Continuous>  dextrose 50% Injectable 50 every 15 minutes  dextrose 50% Injectable 25 every 15 minutes  dextrose Oral Gel 15 once PRN  fentaNYL    Injectable 25 every 3 hours PRN  fentaNYL    Injectable 50 once  glucagon  Injectable 1 once  insulin lispro (ADMELOG) corrective regimen sliding scale  every 6 hours  lacosamide IVPB 100 every 12 hours  pantoprazole  Injectable 40 daily  phenylephrine    Infusion 0.1 <Continuous>  piperacillin/tazobactam IVPB.. 3.375 every 8 hours  sodium chloride 0.9%. 1000 <Continuous>  valproate sodium  IVPB 750 every 12 hours      T(C): , Max: 37 (03-28-23 @ 05:07)  T(F): , Max: 98.6 (03-28-23 @ 05:07)  HR: 117 (03-28-23 @ 10:00)  BP: --  BP(mean): --  RR: 22 (03-28-23 @ 10:00)  SpO2: 97% (03-28-23 @ 10:00)  Wt(kg): --    03-27 @ 07:01  -  03-28 @ 07:00  --------------------------------------------------------  IN: 3396.1 mL / OUT: 6125 mL / NET: -2728.9 mL    03-28 @ 07:01  -  03-28 @ 10:23  --------------------------------------------------------  IN: 331.3 mL / OUT: 435 mL / NET: -103.7 mL          Review of Systems:  ROS negative except as per HPI      PHYSICAL EXAM:  GENERAL: intubated; NAD  CHEST/LUNG: decreased breath sounds in bases B/L, on MV  HEART: normal S1S2, tachycardic  ABDOMEN: Soft, Nontender, +BS,   EXTREMITIES: trace edema BL LE and UE  NEURO: Following commands  ACCESS: Rt IJ S/Q HD Cath      LABS:                        10.0   17.59 )-----------( 81       ( 28 Mar 2023 02:08 )             28.5     03-28    137  |  101  |  58<H>  ----------------------------<  133<H>  3.2<L>   |  24  |  2.94<H>    Ca    8.0<L>      28 Mar 2023 02:08  Phos  4.3     03-28  Mg     2.0     03-28    TPro  4.8<L>  /  Alb  2.3<L>  /  TBili  0.9  /  DBili  x   /  AST  27  /  ALT  9<L>  /  AlkPhos  76  03-28      PT/INR - ( 28 Mar 2023 02:08 )   PT: 15.3 sec;   INR: 1.28          PTT - ( 28 Mar 2023 02:08 )  PTT:28.4 sec          RADIOLOGY & ADDITIONAL STUDIES:

## 2023-03-28 NOTE — PHYSICAL THERAPY INITIAL EVALUATION ADULT - GENERAL OBSERVATIONS, REHAB EVAL
Pt received semi-supine in bed in no acute distress. Referred to as "Ramiro." OT Tripp & RT Madhavi present. +ETT to vent (lip line at 25 before/after, CMV, FiO2 40%, PEEP 5) +EKG +kalpana +central line +NGT +HD catheter +3 bella to wall suction +2 chest tube to wall suction +coulter  +temporary pacemaker + B CAIR boots +SCDs +sternal incision C/D/I

## 2023-03-28 NOTE — OCCUPATIONAL THERAPY INITIAL EVALUATION ADULT - SITTING BALANCE: STATIC
patient fluctuating between fair/poor balance throughout session, patient able to lean forward and bear weight on b/l knees at times/poor balance

## 2023-03-28 NOTE — PHYSICAL THERAPY INITIAL EVALUATION ADULT - ADDITIONAL COMMENTS
As per pt, PTA he was completely independent with all functional mobility, ADLs, and IADLs. Has a shower tub. As per pt's wife at bedside providing history, PTA he was completely independent with all functional mobility, ADLs, and IADLs. Has a shower tub. Pt gave a 'thumbs up' to previously being independent, living with wife.

## 2023-03-28 NOTE — PROGRESS NOTE ADULT - SUBJECTIVE AND OBJECTIVE BOX
CTICU  CRITICAL  CARE  attending     Hand off received 					   Pertinent clinical, laboratory, radiographic, hemodynamic, echocardiographic, respiratory data, microbiologic data and chart were reviewed and analyzed frequently throughout the course of the day and night        52 year old male with HTN, type A aortic dissection s/p Dacron grafts, AV resuspension in 2013,CAD s/p CABG x 1 SVG to RCA (5/2013 with Dr. Medina), seizure disorder (last episode on 7/4/22).  He is a current every day marijuana user  CTA chest, abdomen and pelvis 11/30/22: Status post graft repair of the ascending aorta and portion of aortic arch.  Dissecting aneurysm of the descending thoracic aorta (measuring up to 5.7 cm) and abdominal aorta (3.5 cm infrarenal), similar to the MR angiogram of 9/19/2022.  Nonocclusive dissection flap present within the innominate artery, aneurysmal right subclavian artery and proximal right common carotid artery as well as aneurysmal left common iliac artery.    He was referred by Dr. Jacqueline Gonsales.  He ws screened for Triomphe study but did not qualify.    S/P AVR  Aortic arch replacement  S/P CABG (SVG to RCA).  S/P Aorto left axillary by pass.  S/P TEVAR to descending aorta.      FAMILY HISTORY:  No pertinent family history in first degree relatives    PAST MEDICAL & SURGICAL HISTORY:  HTN (hypertension)  Aortic dissection  CAD (coronary artery disease)  Seizure disorder  S/P aortic bifurcation bypass graft  S/P CABG x 1      14 system review was unremarkable    Vital signs, hemodynamic and respiratory parameters were reviewed from the bedside nursing flow sheet.  ICU Vital Signs Last 24 Hrs  T(C): 37.8 (28 Mar 2023 21:14), Max: 38.1 (28 Mar 2023 16:49)  T(F): 100 (28 Mar 2023 21:14), Max: 100.6 (28 Mar 2023 16:49)  HR: 97 (28 Mar 2023 21:00) (73 - 117)  BP: --  BP(mean): --  ABP: 150/58 (28 Mar 2023 21:00) (104/48 - 167/69)  ABP(mean): 81 (28 Mar 2023 21:00) (62 - 102)  RR: 16 (28 Mar 2023 21:00) (12 - 22)  SpO2: 98% (28 Mar 2023 21:00) (91% - 100%)    O2 Parameters below as of 28 Mar 2023 21:00  Patient On (Oxygen Delivery Method): ventilator,CMV 12/650/5/40%    O2 Concentration (%): 40      Adult Advanced Hemodynamics Last 24 Hrs  CVP(mm Hg): 9 (28 Mar 2023 21:00) (2 - 294)  CVP(cm H2O): --  CO: 4.5 (28 Mar 2023 17:00) (3.7 - 5)  CI: 2.2 (28 Mar 2023 17:00) (1.8 - 2.5)  PA: --  PA(mean): --  PCWP: --  SVR: 1580 (28 Mar 2023 17:00) (1246 - 1757)  SVRI: 3232 (28 Mar 2023 17:00) (0072 - 3515)  PVR: --  PVRI: --, ABG - ( 28 Mar 2023 16:52 )  pH, Arterial: 7.47  pH, Blood: x     /  pCO2: 30    /  pO2: 103   / HCO3: 22    / Base Excess: -0.9  /  SaO2: 99.3              Mode: Spontaneous/ CPAP (Continuous Positive Airway Pressure)  FiO2: 40  PEEP: 5  PS: 10  MAP: 13  PIP: 25    Intake and output was reviewed and the fluid balance was calculated  Daily     Daily   I&O's Summary    27 Mar 2023 07:01  -  28 Mar 2023 07:00  --------------------------------------------------------  IN: 3396.1 mL / OUT: 6125 mL / NET: -2728.9 mL    28 Mar 2023 07:01  -  28 Mar 2023 21:45  --------------------------------------------------------  IN: 2557.8 mL / OUT: 1755 mL / NET: 802.8 mL        All lines and drain sites were assessed    Neuro: No change in the mental status from the baseline. Follows commands. Moves all 4 extremities.  Neck: No JVD.  CVS: S1, S2, No S3.  Lungs: Good air entry bilaterally.  Abd: Soft. No tenderness. + Bowel sounds.  Vascular: + DP/PT.  Extremities: No edema.  Lymphatic: Normal.  Skin: No abnormalities.      labs  CBC Full  -  ( 28 Mar 2023 16:52 )  WBC Count : 19.23 K/uL  RBC Count : 2.85 M/uL  Hemoglobin : 8.8 g/dL  Hematocrit : 25.8 %  Platelet Count - Automated : 76 K/uL  Mean Cell Volume : 90.5 fl  Mean Cell Hemoglobin : 30.9 pg  Mean Cell Hemoglobin Concentration : 34.1 gm/dL  Auto Neutrophil # : x  Auto Lymphocyte # : x  Auto Monocyte # : x  Auto Eosinophil # : x  Auto Basophil # : x  Auto Neutrophil % : x  Auto Lymphocyte % : x  Auto Monocyte % : x  Auto Eosinophil % : x  Auto Basophil % : x    03-28    139  |  101  |  67<H>  ----------------------------<  153<H>  3.1<L>   |  22  |  3.19<H>    Ca    8.2<L>      28 Mar 2023 16:52  Phos  4.2     03-28  Mg     1.9     03-28    TPro  4.4<L>  /  Alb  2.5<L>  /  TBili  1.2  /  DBili  x   /  AST  25  /  ALT  8<L>  /  AlkPhos  62  03-28    PT/INR - ( 28 Mar 2023 16:52 )   PT: 15.8 sec;   INR: 1.32          PTT - ( 28 Mar 2023 16:52 )  PTT:28.6 sec  The current medications were reviewed   MEDICATIONS  (STANDING):  aspirin  chewable 81 milliGRAM(s) Oral daily  aspirin  chewable 81 milliGRAM(s) Enteral Tube daily  buMETAnide Injectable 2 milliGRAM(s) IV Push every 12 hours  chlorhexidine 0.12% Liquid 15 milliLiter(s) Oral Mucosa every 12 hours  chlorhexidine 2% Cloths 1 Application(s) Topical daily  dexMEDEtomidine Infusion 0.4 MICROgram(s)/kG/Hr (7.71 mL/Hr) IV Continuous <Continuous>  dextrose 10%. 1000 milliLiter(s) (20 mL/Hr) IV Continuous <Continuous>  dextrose 5%. 1000 milliLiter(s) (50 mL/Hr) IV Continuous <Continuous>  dextrose 5%. 1000 milliLiter(s) (100 mL/Hr) IV Continuous <Continuous>  dextrose 50% Injectable 50 milliLiter(s) IV Push every 15 minutes  dextrose 50% Injectable 25 milliLiter(s) IV Push every 15 minutes  glucagon  Injectable 1 milliGRAM(s) IntraMuscular once  insulin lispro (ADMELOG) corrective regimen sliding scale   SubCutaneous every 6 hours  lacosamide IVPB 100 milliGRAM(s) IV Intermittent every 12 hours  pantoprazole  Injectable 40 milliGRAM(s) IV Push daily  phenylephrine    Infusion 0.1 MICROgram(s)/kG/Min (2.89 mL/Hr) IV Continuous <Continuous>  piperacillin/tazobactam IVPB.. 3.375 Gram(s) IV Intermittent every 8 hours  sodium chloride 0.9%. 1000 milliLiter(s) (10 mL/Hr) IV Continuous <Continuous>  valproate sodium  IVPB 750 milliGRAM(s) IV Intermittent every 12 hours    MEDICATIONS  (PRN):  dextrose Oral Gel 15 Gram(s) Oral once PRN Blood Glucose LESS THAN 70 milliGRAM(s)/deciliter  fentaNYL    Injectable 25 MICROGram(s) IV Push every 3 hours PRN Severe Pain (7 - 10)            53 year old  Male with expanding aortic arch and descending aortic aneurysm   S/P AVR  Aortic arch replacement  S/P CABG (SVG to RCA).  S/P Aorto left axillary by pass.  S/P TEVAR to descending aorta.  Hypokalemia  Renal failure   Metabolic acidosis.  Thrombocytopenia  Seizure disorder.  Hemodynamically stable.  Good oxygenation.  Fair urine out put.        My plan includes :  WEAN to Extubate.  D/C phenylephrine.  Statin and Betablocker.  Anti seizure Rx.  Gentle Diuresis.  Close hemodynamic, ventilatory and drain monitoring and management  Monitor for arrhythmias and monitor parameters for organ perfusion  Monitor neurologic status  Monitor renal function.  Head of the bed should remain elevated to 45 deg .   Chest PT and IS will be encouraged  Monitor adequacy of oxygenation and ventilation and attempt to wean oxygen  Nutritional goals will be met using po eventually , ensure adequate caloric intake and monitor the same  Stress ulcer and VTE prophylaxis will be achieved    Glycemic control is satisfactory  Electrolytes have been repleted as necessary and wound care has been carried out. Pain control has been achieved.   Aggressive physical therapy and early mobility and ambulation goals will be met   The family was updated about the course and plan  CRITICAL CARE TIME SPENT in evaluation and management, reassessments, review and interpretation of labs and x-rays, ventilator and hemodynamic management, formulating a plan and coordinating care: ___30____ MIN.  Time does not include procedural time.  CTICU ATTENDING     					    José Miguel Torres MD

## 2023-03-28 NOTE — OCCUPATIONAL THERAPY INITIAL EVALUATION ADULT - PERSONAL SAFETY AND JUDGMENT, REHAB EVAL
agitated and impulsive towards end of session with decreased alertness and command following/impaired

## 2023-03-28 NOTE — OCCUPATIONAL THERAPY INITIAL EVALUATION ADULT - DIAGNOSIS, OT EVAL
Patient POD #8 s/p transverse aortic arch replacement, second stage TEVAR, patient intubated- therapy for early mobility, presents with decreased overall strength, balance, activity tolerance, L sided lean during sitting, with poor postural control and trunk strength impacting independence with functional activities and mobility.

## 2023-03-28 NOTE — OCCUPATIONAL THERAPY INITIAL EVALUATION ADULT - LEVEL OF INDEPENDENCE: SIT/STAND, REHAB EVAL
Deferred 2/2 impulsive and agitation with decreased command following and alertness towards end of session

## 2023-03-28 NOTE — OCCUPATIONAL THERAPY INITIAL EVALUATION ADULT - BED MOBILITY LIMITATIONS, REHAB EVAL
Patient able to sit at EOB for 10 mins and grasp onto therapist hands to pull self forward and bring shoulders back/head/neck upright x 3 trials 10 seconds each

## 2023-03-29 LAB
-  AMPICILLIN/SULBACTAM: SIGNIFICANT CHANGE UP
-  AMPICILLIN: SIGNIFICANT CHANGE UP
-  CEFAZOLIN: SIGNIFICANT CHANGE UP
-  CEFEPIME: SIGNIFICANT CHANGE UP
-  CEFTRIAXONE: SIGNIFICANT CHANGE UP
-  CIPROFLOXACIN: SIGNIFICANT CHANGE UP
-  ERTAPENEM: SIGNIFICANT CHANGE UP
-  GENTAMICIN: SIGNIFICANT CHANGE UP
-  PIPERACILLIN/TAZOBACTAM: SIGNIFICANT CHANGE UP
-  TOBRAMYCIN: SIGNIFICANT CHANGE UP
-  TRIMETHOPRIM/SULFAMETHOXAZOLE: SIGNIFICANT CHANGE UP
ALBUMIN SERPL ELPH-MCNC: 2.3 G/DL — LOW (ref 3.3–5)
ALBUMIN SERPL ELPH-MCNC: 2.5 G/DL — LOW (ref 3.3–5)
ALBUMIN SERPL ELPH-MCNC: 2.8 G/DL — LOW (ref 3.3–5)
ALP SERPL-CCNC: 107 U/L — SIGNIFICANT CHANGE UP (ref 40–120)
ALP SERPL-CCNC: 72 U/L — SIGNIFICANT CHANGE UP (ref 40–120)
ALP SERPL-CCNC: 78 U/L — SIGNIFICANT CHANGE UP (ref 40–120)
ALT FLD-CCNC: 8 U/L — LOW (ref 10–45)
ALT FLD-CCNC: 9 U/L — LOW (ref 10–45)
ALT FLD-CCNC: 9 U/L — LOW (ref 10–45)
ANION GAP SERPL CALC-SCNC: 10 MMOL/L — SIGNIFICANT CHANGE UP (ref 5–17)
ANION GAP SERPL CALC-SCNC: 14 MMOL/L — SIGNIFICANT CHANGE UP (ref 5–17)
ANION GAP SERPL CALC-SCNC: 9 MMOL/L — SIGNIFICANT CHANGE UP (ref 5–17)
APTT BLD: 28.1 SEC — SIGNIFICANT CHANGE UP (ref 27.5–35.5)
APTT BLD: 28.8 SEC — SIGNIFICANT CHANGE UP (ref 27.5–35.5)
AST SERPL-CCNC: 28 U/L — SIGNIFICANT CHANGE UP (ref 10–40)
AST SERPL-CCNC: 29 U/L — SIGNIFICANT CHANGE UP (ref 10–40)
AST SERPL-CCNC: 32 U/L — SIGNIFICANT CHANGE UP (ref 10–40)
BILIRUB SERPL-MCNC: 1.3 MG/DL — HIGH (ref 0.2–1.2)
BILIRUB SERPL-MCNC: 1.4 MG/DL — HIGH (ref 0.2–1.2)
BILIRUB SERPL-MCNC: 1.7 MG/DL — HIGH (ref 0.2–1.2)
BUN SERPL-MCNC: 66 MG/DL — HIGH (ref 7–23)
BUN SERPL-MCNC: 66 MG/DL — HIGH (ref 7–23)
BUN SERPL-MCNC: 67 MG/DL — HIGH (ref 7–23)
CALCIUM SERPL-MCNC: 8.4 MG/DL — SIGNIFICANT CHANGE UP (ref 8.4–10.5)
CALCIUM SERPL-MCNC: 8.4 MG/DL — SIGNIFICANT CHANGE UP (ref 8.4–10.5)
CALCIUM SERPL-MCNC: 8.7 MG/DL — SIGNIFICANT CHANGE UP (ref 8.4–10.5)
CHLORIDE SERPL-SCNC: 103 MMOL/L — SIGNIFICANT CHANGE UP (ref 96–108)
CHLORIDE SERPL-SCNC: 105 MMOL/L — SIGNIFICANT CHANGE UP (ref 96–108)
CHLORIDE SERPL-SCNC: 105 MMOL/L — SIGNIFICANT CHANGE UP (ref 96–108)
CO2 SERPL-SCNC: 23 MMOL/L — SIGNIFICANT CHANGE UP (ref 22–31)
CO2 SERPL-SCNC: 24 MMOL/L — SIGNIFICANT CHANGE UP (ref 22–31)
CO2 SERPL-SCNC: 24 MMOL/L — SIGNIFICANT CHANGE UP (ref 22–31)
CREAT SERPL-MCNC: 3.04 MG/DL — HIGH (ref 0.5–1.3)
CREAT SERPL-MCNC: 3.13 MG/DL — HIGH (ref 0.5–1.3)
CREAT SERPL-MCNC: 3.23 MG/DL — HIGH (ref 0.5–1.3)
CULTURE RESULTS: SIGNIFICANT CHANGE UP
EGFR: 22 ML/MIN/1.73M2 — LOW
EGFR: 23 ML/MIN/1.73M2 — LOW
EGFR: 24 ML/MIN/1.73M2 — LOW
GAS PNL BLDA: SIGNIFICANT CHANGE UP
GLUCOSE BLDC GLUCOMTR-MCNC: 107 MG/DL — HIGH (ref 70–99)
GLUCOSE BLDC GLUCOMTR-MCNC: 109 MG/DL — HIGH (ref 70–99)
GLUCOSE BLDC GLUCOMTR-MCNC: 91 MG/DL — SIGNIFICANT CHANGE UP (ref 70–99)
GLUCOSE BLDC GLUCOMTR-MCNC: 99 MG/DL — SIGNIFICANT CHANGE UP (ref 70–99)
GLUCOSE SERPL-MCNC: 130 MG/DL — HIGH (ref 70–99)
GLUCOSE SERPL-MCNC: 137 MG/DL — HIGH (ref 70–99)
GLUCOSE SERPL-MCNC: 139 MG/DL — HIGH (ref 70–99)
HCT VFR BLD CALC: 25 % — LOW (ref 39–50)
HCT VFR BLD CALC: 26.9 % — LOW (ref 39–50)
HCT VFR BLD CALC: 27.6 % — LOW (ref 39–50)
HGB BLD-MCNC: 8.6 G/DL — LOW (ref 13–17)
HGB BLD-MCNC: 9.1 G/DL — LOW (ref 13–17)
HGB BLD-MCNC: 9.5 G/DL — LOW (ref 13–17)
INR BLD: 1.34 — HIGH (ref 0.88–1.16)
INR BLD: 1.37 — HIGH (ref 0.88–1.16)
MAGNESIUM SERPL-MCNC: 1.8 MG/DL — SIGNIFICANT CHANGE UP (ref 1.6–2.6)
MAGNESIUM SERPL-MCNC: 2 MG/DL — SIGNIFICANT CHANGE UP (ref 1.6–2.6)
MAGNESIUM SERPL-MCNC: 2 MG/DL — SIGNIFICANT CHANGE UP (ref 1.6–2.6)
MCHC RBC-ENTMCNC: 30.4 PG — SIGNIFICANT CHANGE UP (ref 27–34)
MCHC RBC-ENTMCNC: 30.6 PG — SIGNIFICANT CHANGE UP (ref 27–34)
MCHC RBC-ENTMCNC: 31 PG — SIGNIFICANT CHANGE UP (ref 27–34)
MCHC RBC-ENTMCNC: 33.8 GM/DL — SIGNIFICANT CHANGE UP (ref 32–36)
MCHC RBC-ENTMCNC: 34.4 GM/DL — SIGNIFICANT CHANGE UP (ref 32–36)
MCHC RBC-ENTMCNC: 34.4 GM/DL — SIGNIFICANT CHANGE UP (ref 32–36)
MCV RBC AUTO: 89 FL — SIGNIFICANT CHANGE UP (ref 80–100)
MCV RBC AUTO: 90 FL — SIGNIFICANT CHANGE UP (ref 80–100)
MCV RBC AUTO: 90.2 FL — SIGNIFICANT CHANGE UP (ref 80–100)
METHOD TYPE: SIGNIFICANT CHANGE UP
NRBC # BLD: 0 /100 WBCS — SIGNIFICANT CHANGE UP (ref 0–0)
ORGANISM # SPEC MICROSCOPIC CNT: SIGNIFICANT CHANGE UP
ORGANISM # SPEC MICROSCOPIC CNT: SIGNIFICANT CHANGE UP
PHOSPHATE SERPL-MCNC: 3.4 MG/DL — SIGNIFICANT CHANGE UP (ref 2.5–4.5)
PHOSPHATE SERPL-MCNC: 3.7 MG/DL — SIGNIFICANT CHANGE UP (ref 2.5–4.5)
PHOSPHATE SERPL-MCNC: 4 MG/DL — SIGNIFICANT CHANGE UP (ref 2.5–4.5)
PLATELET # BLD AUTO: 129 K/UL — LOW (ref 150–400)
PLATELET # BLD AUTO: 145 K/UL — LOW (ref 150–400)
PLATELET # BLD AUTO: 79 K/UL — LOW (ref 150–400)
POTASSIUM SERPL-MCNC: 3.1 MMOL/L — LOW (ref 3.5–5.3)
POTASSIUM SERPL-MCNC: 3.5 MMOL/L — SIGNIFICANT CHANGE UP (ref 3.5–5.3)
POTASSIUM SERPL-MCNC: 3.8 MMOL/L — SIGNIFICANT CHANGE UP (ref 3.5–5.3)
POTASSIUM SERPL-SCNC: 3.1 MMOL/L — LOW (ref 3.5–5.3)
POTASSIUM SERPL-SCNC: 3.5 MMOL/L — SIGNIFICANT CHANGE UP (ref 3.5–5.3)
POTASSIUM SERPL-SCNC: 3.8 MMOL/L — SIGNIFICANT CHANGE UP (ref 3.5–5.3)
PROT SERPL-MCNC: 4.6 G/DL — LOW (ref 6–8.3)
PROT SERPL-MCNC: 5.1 G/DL — LOW (ref 6–8.3)
PROT SERPL-MCNC: 5.3 G/DL — LOW (ref 6–8.3)
PROTHROM AB SERPL-ACNC: 16 SEC — HIGH (ref 10.5–13.4)
PROTHROM AB SERPL-ACNC: 16.3 SEC — HIGH (ref 10.5–13.4)
RBC # BLD: 2.81 M/UL — LOW (ref 4.2–5.8)
RBC # BLD: 2.99 M/UL — LOW (ref 4.2–5.8)
RBC # BLD: 3.06 M/UL — LOW (ref 4.2–5.8)
RBC # FLD: 15.4 % — HIGH (ref 10.3–14.5)
RBC # FLD: 15.6 % — HIGH (ref 10.3–14.5)
RBC # FLD: 15.8 % — HIGH (ref 10.3–14.5)
SODIUM SERPL-SCNC: 138 MMOL/L — SIGNIFICANT CHANGE UP (ref 135–145)
SODIUM SERPL-SCNC: 139 MMOL/L — SIGNIFICANT CHANGE UP (ref 135–145)
SODIUM SERPL-SCNC: 140 MMOL/L — SIGNIFICANT CHANGE UP (ref 135–145)
SPECIMEN SOURCE: SIGNIFICANT CHANGE UP
TROPONIN T SERPL-MCNC: 2.2 NG/ML — CRITICAL HIGH (ref 0–0.01)
UNFRACTIONATED HEPARIN INTERPRETATION: SIGNIFICANT CHANGE UP
UNFRACTIONATED HEPARIN RESULT: NEGATIVE — SIGNIFICANT CHANGE UP
UNFRACTIONATED HEPARIN RESULT: NEGATIVE — SIGNIFICANT CHANGE UP
UNFRACTIONATED HEPARIN-HIGH DOSE: 0 % — SIGNIFICANT CHANGE UP
UNFRACTIONATED HEPARIN-HIGH DOSE: 0 % — SIGNIFICANT CHANGE UP
UNFRACTIONATED HEPARIN-LOW DOSE: 0 % — SIGNIFICANT CHANGE UP
UNFRACTIONATED HEPARIN-LOW DOSE: 0 % — SIGNIFICANT CHANGE UP
WBC # BLD: 21.72 K/UL — HIGH (ref 3.8–10.5)
WBC # BLD: 25.33 K/UL — HIGH (ref 3.8–10.5)
WBC # BLD: 25.52 K/UL — HIGH (ref 3.8–10.5)
WBC # FLD AUTO: 21.72 K/UL — HIGH (ref 3.8–10.5)
WBC # FLD AUTO: 25.33 K/UL — HIGH (ref 3.8–10.5)
WBC # FLD AUTO: 25.52 K/UL — HIGH (ref 3.8–10.5)

## 2023-03-29 RX ORDER — HEPARIN SODIUM 5000 [USP'U]/ML
5000 INJECTION INTRAVENOUS; SUBCUTANEOUS EVERY 12 HOURS
Refills: 0 | Status: DISCONTINUED | OUTPATIENT
Start: 2023-03-29 | End: 2023-03-29

## 2023-03-29 RX ORDER — FENTANYL CITRATE 50 UG/ML
25 INJECTION INTRAVENOUS ONCE
Refills: 0 | Status: DISCONTINUED | OUTPATIENT
Start: 2023-03-29 | End: 2023-03-29

## 2023-03-29 RX ORDER — MAGNESIUM SULFATE 500 MG/ML
2 VIAL (ML) INJECTION ONCE
Refills: 0 | Status: COMPLETED | OUTPATIENT
Start: 2023-03-29 | End: 2023-03-29

## 2023-03-29 RX ORDER — NICARDIPINE HYDROCHLORIDE 30 MG/1
5 CAPSULE, EXTENDED RELEASE ORAL
Qty: 40 | Refills: 0 | Status: DISCONTINUED | OUTPATIENT
Start: 2023-03-29 | End: 2023-03-29

## 2023-03-29 RX ORDER — POTASSIUM CHLORIDE 20 MEQ
20 PACKET (EA) ORAL
Refills: 0 | Status: COMPLETED | OUTPATIENT
Start: 2023-03-29 | End: 2023-03-29

## 2023-03-29 RX ORDER — HEPARIN SODIUM 5000 [USP'U]/ML
5000 INJECTION INTRAVENOUS; SUBCUTANEOUS EVERY 8 HOURS
Refills: 0 | Status: DISCONTINUED | OUTPATIENT
Start: 2023-03-29 | End: 2023-04-10

## 2023-03-29 RX ORDER — POTASSIUM CHLORIDE 20 MEQ
20 PACKET (EA) ORAL ONCE
Refills: 0 | Status: COMPLETED | OUTPATIENT
Start: 2023-03-29 | End: 2023-03-29

## 2023-03-29 RX ORDER — METOPROLOL TARTRATE 50 MG
5 TABLET ORAL ONCE
Refills: 0 | Status: COMPLETED | OUTPATIENT
Start: 2023-03-29 | End: 2023-03-29

## 2023-03-29 RX ORDER — METOPROLOL TARTRATE 50 MG
5 TABLET ORAL ONCE
Refills: 0 | Status: DISCONTINUED | OUTPATIENT
Start: 2023-03-29 | End: 2023-03-29

## 2023-03-29 RX ORDER — METOPROLOL TARTRATE 50 MG
5 TABLET ORAL EVERY 6 HOURS
Refills: 0 | Status: DISCONTINUED | OUTPATIENT
Start: 2023-03-29 | End: 2023-04-01

## 2023-03-29 RX ADMIN — FENTANYL CITRATE 25 MICROGRAM(S): 50 INJECTION INTRAVENOUS at 02:42

## 2023-03-29 RX ADMIN — FENTANYL CITRATE 25 MICROGRAM(S): 50 INJECTION INTRAVENOUS at 06:14

## 2023-03-29 RX ADMIN — Medication 100 MILLIEQUIVALENT(S): at 12:56

## 2023-03-29 RX ADMIN — Medication 57.5 MILLIGRAM(S): at 06:14

## 2023-03-29 RX ADMIN — Medication 5 MILLIGRAM(S): at 23:41

## 2023-03-29 RX ADMIN — FENTANYL CITRATE 25 MICROGRAM(S): 50 INJECTION INTRAVENOUS at 13:14

## 2023-03-29 RX ADMIN — Medication 100 MILLIEQUIVALENT(S): at 07:53

## 2023-03-29 RX ADMIN — Medication 100 MILLIEQUIVALENT(S): at 19:37

## 2023-03-29 RX ADMIN — PIPERACILLIN AND TAZOBACTAM 25 GRAM(S): 4; .5 INJECTION, POWDER, LYOPHILIZED, FOR SOLUTION INTRAVENOUS at 14:09

## 2023-03-29 RX ADMIN — CHLORHEXIDINE GLUCONATE 1 APPLICATION(S): 213 SOLUTION TOPICAL at 06:16

## 2023-03-29 RX ADMIN — FENTANYL CITRATE 25 MICROGRAM(S): 50 INJECTION INTRAVENOUS at 10:32

## 2023-03-29 RX ADMIN — Medication 25 GRAM(S): at 18:30

## 2023-03-29 RX ADMIN — PIPERACILLIN AND TAZOBACTAM 25 GRAM(S): 4; .5 INJECTION, POWDER, LYOPHILIZED, FOR SOLUTION INTRAVENOUS at 21:11

## 2023-03-29 RX ADMIN — HEPARIN SODIUM 5000 UNIT(S): 5000 INJECTION INTRAVENOUS; SUBCUTANEOUS at 21:12

## 2023-03-29 RX ADMIN — FENTANYL CITRATE 25 MICROGRAM(S): 50 INJECTION INTRAVENOUS at 13:29

## 2023-03-29 RX ADMIN — BUMETANIDE 2 MILLIGRAM(S): 0.25 INJECTION INTRAMUSCULAR; INTRAVENOUS at 17:49

## 2023-03-29 RX ADMIN — Medication 81 MILLIGRAM(S): at 17:49

## 2023-03-29 RX ADMIN — Medication 100 MILLIEQUIVALENT(S): at 07:54

## 2023-03-29 RX ADMIN — FENTANYL CITRATE 25 MICROGRAM(S): 50 INJECTION INTRAVENOUS at 23:47

## 2023-03-29 RX ADMIN — Medication 5 MILLIGRAM(S): at 11:15

## 2023-03-29 RX ADMIN — Medication 100 MILLIEQUIVALENT(S): at 18:00

## 2023-03-29 RX ADMIN — PANTOPRAZOLE SODIUM 40 MILLIGRAM(S): 20 TABLET, DELAYED RELEASE ORAL at 12:21

## 2023-03-29 RX ADMIN — Medication 100 MILLIEQUIVALENT(S): at 04:04

## 2023-03-29 RX ADMIN — Medication 20 MILLILITER(S): at 10:47

## 2023-03-29 RX ADMIN — FENTANYL CITRATE 25 MICROGRAM(S): 50 INJECTION INTRAVENOUS at 06:30

## 2023-03-29 RX ADMIN — HEPARIN SODIUM 5000 UNIT(S): 5000 INJECTION INTRAVENOUS; SUBCUTANEOUS at 14:09

## 2023-03-29 RX ADMIN — Medication 5 MILLIGRAM(S): at 17:49

## 2023-03-29 RX ADMIN — DEXMEDETOMIDINE HYDROCHLORIDE IN 0.9% SODIUM CHLORIDE 7.71 MICROGRAM(S)/KG/HR: 4 INJECTION INTRAVENOUS at 01:25

## 2023-03-29 RX ADMIN — BUMETANIDE 2 MILLIGRAM(S): 0.25 INJECTION INTRAMUSCULAR; INTRAVENOUS at 01:24

## 2023-03-29 RX ADMIN — PIPERACILLIN AND TAZOBACTAM 25 GRAM(S): 4; .5 INJECTION, POWDER, LYOPHILIZED, FOR SOLUTION INTRAVENOUS at 06:14

## 2023-03-29 RX ADMIN — CHLORHEXIDINE GLUCONATE 15 MILLILITER(S): 213 SOLUTION TOPICAL at 06:14

## 2023-03-29 RX ADMIN — Medication 5 MILLIGRAM(S): at 07:15

## 2023-03-29 RX ADMIN — FENTANYL CITRATE 25 MICROGRAM(S): 50 INJECTION INTRAVENOUS at 03:00

## 2023-03-29 RX ADMIN — Medication 57.5 MILLIGRAM(S): at 19:01

## 2023-03-29 RX ADMIN — Medication 5 MILLIGRAM(S): at 13:45

## 2023-03-29 RX ADMIN — LACOSAMIDE 120 MILLIGRAM(S): 50 TABLET ORAL at 09:00

## 2023-03-29 RX ADMIN — FENTANYL CITRATE 25 MICROGRAM(S): 50 INJECTION INTRAVENOUS at 10:47

## 2023-03-29 RX ADMIN — FENTANYL CITRATE 25 MICROGRAM(S): 50 INJECTION INTRAVENOUS at 21:02

## 2023-03-29 RX ADMIN — FENTANYL CITRATE 25 MICROGRAM(S): 50 INJECTION INTRAVENOUS at 20:47

## 2023-03-29 RX ADMIN — NICARDIPINE HYDROCHLORIDE 25 MG/HR: 30 CAPSULE, EXTENDED RELEASE ORAL at 10:47

## 2023-03-29 RX ADMIN — LACOSAMIDE 120 MILLIGRAM(S): 50 TABLET ORAL at 19:31

## 2023-03-29 NOTE — PROGRESS NOTE ADULT - SUBJECTIVE AND OBJECTIVE BOX
Seen and evaluated at bedside. Mild distress, awake, alert, on HFNC, diuresed 3.2L/24hrs on Bumex 2mg q12hrs, with pos 200mL FB. Electrolytes wnl, with stable renal function.       Meds:    aspirin  chewable 81 daily  buMETAnide Injectable 2 every 12 hours  chlorhexidine 2% Cloths 1 daily  dextrose 10%. 1000 <Continuous>  dextrose 5%. 1000 <Continuous>  dextrose 5%. 1000 <Continuous>  dextrose 50% Injectable 50 every 15 minutes  dextrose 50% Injectable 25 every 15 minutes  dextrose Oral Gel 15 once PRN  fentaNYL    Injectable 25 every 3 hours PRN  glucagon  Injectable 1 once  insulin lispro (ADMELOG) corrective regimen sliding scale  every 6 hours  lacosamide IVPB 100 every 12 hours  metoprolol tartrate Injectable 5 every 6 hours  niCARdipine Infusion 5 <Continuous>  pantoprazole  Injectable 40 daily  piperacillin/tazobactam IVPB.. 3.375 every 8 hours  sodium chloride 0.9%. 1000 <Continuous>  valproate sodium  IVPB 750 every 12 hours      T(C): , Max: 38.1 (03-28-23 @ 16:49)  T(F): , Max: 100.6 (03-28-23 @ 16:49)  HR: 115 (03-29-23 @ 11:00)  BP: 169/73  RR: 20 (03-29-23 @ 11:00)  SpO2: 100% (03-29-23 @ 11:00)  Wt(kg): --    03-28 @ 07:01 - 03-29 @ 07:00  --------------------------------------------------------  IN: 3451.4 mL / OUT: 3280 mL / NET: 171.4 mL    03-29 @ 07:01 - 03-29 @ 11:51  --------------------------------------------------------  IN: 510.4 mL / OUT: 450 mL / NET: 60.4 mL          Review of Systems:  ROS negative except as per HPI      PHYSICAL EXAM:  GENERAL: Mild distress, sitting up in bed, on HFNC   NECK: supple, No JVD  CHEST/LUNG: poor inspiratory effort, no wheezing, tachypneic   HEART: normal S1S2, RRR  ABDOMEN: Soft, Nontender, +BS, No flank tenderness bilateral  : Cantrell in place with clear urine   Other: Rectal bag in place with liquid stool   EXTREMITIES: No clubbing, cyanosis, or edema   NEUROLOGY: AAO x3, no focal neurological deficit  ACCESS: Rt IJ S/Q HD Cath      LABS:                        9.5    25.33 )-----------( 129      ( 29 Mar 2023 10:17 )             27.6     03-29    139  |  105  |  66<H>  ----------------------------<  139<H>  3.8   |  24  |  3.13<H>    Ca    8.7      29 Mar 2023 10:17  Phos  3.7     03-29  Mg     2.0     03-29    TPro  5.3<L>  /  Alb  2.8<L>  /  TBili  1.4<H>  /  DBili  x   /  AST  29  /  ALT  9<L>  /  AlkPhos  72  03-29      PT/INR - ( 29 Mar 2023 10:17 )   PT: 16.0 sec;   INR: 1.34          PTT - ( 29 Mar 2023 10:17 )  PTT:28.1 sec          RADIOLOGY & ADDITIONAL STUDIES:

## 2023-03-29 NOTE — PROGRESS NOTE ADULT - SUBJECTIVE AND OBJECTIVE BOX
CTICU  CRITICAL  CARE  attending     Hand off received 					   Pertinent clinical, laboratory, radiographic, hemodynamic, echocardiographic, respiratory data, microbiologic data and chart were reviewed and analyzed frequently throughout the course of the day and night.        52 year old male with HTN, type A aortic dissection s/p Dacron grafts, AV resuspension in 2013,CAD s/p CABG x 1 SVG to RCA (5/2013 with Dr. Medina), seizure disorder (last episode on 7/4/22).  He is a current every day marijuana user  CTA chest, abdomen and pelvis 11/30/22: Status post graft repair of the ascending aorta and portion of aortic arch.  Dissecting aneurysm of the descending thoracic aorta (measuring up to 5.7 cm) and abdominal aorta (3.5 cm infrarenal), similar to the MR angiogram of 9/19/2022.  Nonocclusive dissection flap present within the innominate artery, aneurysmal right subclavian artery and proximal right common carotid artery as well as aneurysmal left common iliac artery.    He was referred by Dr. Jacqueline Gonsales.  He ws screened for Triomphe study but did not qualify.    S/P AVR  Aortic arch replacement  S/P CABG (SVG to RCA).  S/P Aorto left axillary by pass.  S/P TEVAR to descending aorta.        FAMILY HISTORY:  No pertinent family history in first degree relatives    PAST MEDICAL & SURGICAL HISTORY:  HTN (hypertension)  Aortic dissection  CAD (coronary artery disease)  Seizure disorder  S/P aortic bifurcation bypass graft  S/P CABG x 1        14 system review was unremarkable    Vital signs, hemodynamic and respiratory parameters were reviewed from the bedside nursing flow sheet.  ICU Vital Signs Last 24 Hrs  T(C): 36.6 (29 Mar 2023 21:16), Max: 37.6 (29 Mar 2023 01:20)  T(F): 97.8 (29 Mar 2023 21:16), Max: 99.6 (29 Mar 2023 01:20)  HR: 104 (29 Mar 2023 21:08) (87 - 131)  BP: 141/88 (29 Mar 2023 21:00) (128/81 - 150/85)  BP(mean): 110 (29 Mar 2023 21:00) (96 - 110)  ABP: 152/72 (29 Mar 2023 21:00) (127/51 - 203/83)  ABP(mean): 93 (29 Mar 2023 21:00) (69 - 111)  RR: 18 (29 Mar 2023 21:08) (14 - 21)  SpO2: 96% (29 Mar 2023 21:08) (91% - 100%)    O2 Parameters below as of 29 Mar 2023 21:08  Patient On (Oxygen Delivery Method): nasal cannula w/ humidification  O2 Flow (L/min): 4        Adult Advanced Hemodynamics Last 24 Hrs  CVP(mm Hg): 8 (29 Mar 2023 21:00) (2 - 13)  CVP(cm H2O): --  CO: 11.9 (29 Mar 2023 10:00) (11.4 - 11.9)  CI: 5.9 (29 Mar 2023 10:00) (5.4 - 5.9)  PA: --  PA(mean): --  PCWP: --  SVR: 537 (29 Mar 2023 10:00) (537 - 563)  SVRI: 1083 (29 Mar 2023 10:00) (1083 - 1121)  PVR: --  PVRI: --, ABG - ( 29 Mar 2023 16:25 )  pH, Arterial: 7.44  pH, Blood: x     /  pCO2: 34    /  pO2: 84    / HCO3: 23    / Base Excess: -0.5  /  SaO2: 98.6              Mode: CPAP with PS  FiO2: 40  PEEP: 5  PS: 5  MAP: 7  PIP: 13    Intake and output was reviewed and the fluid balance was calculated  Daily     Daily   I&O's Summary    28 Mar 2023 07:01  -  29 Mar 2023 07:00  --------------------------------------------------------  IN: 3451.4 mL / OUT: 3280 mL / NET: 171.4 mL    29 Mar 2023 07:01  -  29 Mar 2023 21:54  --------------------------------------------------------  IN: 1801.2 mL / OUT: 1985 mL / NET: -183.8 mL        All lines and drain sites were assessed    Neuro: No change in the neurological status.  Neck: No JVD.  CVS: S1, S2, No S3.  Lungs: Good air entry bilaterally.  Abd: Soft. No tenderness. + Bowel sounds.  Vascular: + DP/PT.  Extremities: No edema.  Lymphatic: Normal.  Skin: No abnormalities.      labs  CBC Full  -  ( 29 Mar 2023 16:25 )  WBC Count : 25.52 K/uL  RBC Count : 2.99 M/uL  Hemoglobin : 9.1 g/dL  Hematocrit : 26.9 %  Platelet Count - Automated : 145 K/uL  Mean Cell Volume : 90.0 fl  Mean Cell Hemoglobin : 30.4 pg  Mean Cell Hemoglobin Concentration : 33.8 gm/dL  Auto Neutrophil # : x  Auto Lymphocyte # : x  Auto Monocyte # : x  Auto Eosinophil # : x  Auto Basophil # : x  Auto Neutrophil % : x  Auto Lymphocyte % : x  Auto Monocyte % : x  Auto Eosinophil % : x  Auto Basophil % : x    03-29    138  |  105  |  67<H>  ----------------------------<  130<H>  3.5   |  24  |  3.04<H>    Ca    8.4      29 Mar 2023 16:25  Phos  3.4     03-29  Mg     1.8     03-29    TPro  5.1<L>  /  Alb  2.5<L>  /  TBili  1.7<H>  /  DBili  x   /  AST  28  /  ALT  9<L>  /  AlkPhos  78  03-29    PT/INR - ( 29 Mar 2023 10:17 )   PT: 16.0 sec;   INR: 1.34          PTT - ( 29 Mar 2023 10:17 )  PTT:28.1 sec  The current medications were reviewed   MEDICATIONS  (STANDING):  aspirin  chewable 81 milliGRAM(s) Oral daily  buMETAnide Injectable 2 milliGRAM(s) IV Push every 12 hours  chlorhexidine 2% Cloths 1 Application(s) Topical daily  dextrose 10%. 1000 milliLiter(s) (20 mL/Hr) IV Continuous <Continuous>  dextrose 5%. 1000 milliLiter(s) (50 mL/Hr) IV Continuous <Continuous>  dextrose 5%. 1000 milliLiter(s) (100 mL/Hr) IV Continuous <Continuous>  dextrose 50% Injectable 50 milliLiter(s) IV Push every 15 minutes  dextrose 50% Injectable 25 milliLiter(s) IV Push every 15 minutes  glucagon  Injectable 1 milliGRAM(s) IntraMuscular once  heparin   Injectable 5000 Unit(s) SubCutaneous every 8 hours  insulin lispro (ADMELOG) corrective regimen sliding scale   SubCutaneous every 6 hours  lacosamide IVPB 100 milliGRAM(s) IV Intermittent every 12 hours  metoprolol tartrate Injectable 5 milliGRAM(s) IV Push every 6 hours  pantoprazole  Injectable 40 milliGRAM(s) IV Push daily  piperacillin/tazobactam IVPB.. 3.375 Gram(s) IV Intermittent every 8 hours  sodium chloride 0.9%. 1000 milliLiter(s) (10 mL/Hr) IV Continuous <Continuous>  valproate sodium  IVPB 750 milliGRAM(s) IV Intermittent every 12 hours    MEDICATIONS  (PRN):  dextrose Oral Gel 15 Gram(s) Oral once PRN Blood Glucose LESS THAN 70 milliGRAM(s)/deciliter  fentaNYL    Injectable 25 MICROGram(s) IV Push every 3 hours PRN Severe Pain (7 - 10)            53 year old  Male with expanding aortic arch and descending aortic aneurysm   S/P AVR  Aortic arch replacement  S/P CABG (SVG to RCA).  S/P Aorto left axillary by pass.  S/P TEVAR to descending aorta.  Hypokalemia  Renal Failure  Pneumonia  Hemodynamically stable.  Good oxygenation.  Diuresing well.  Extubated today.        My plan includes :  Statin and Betablocker.  IV Zosyn for Serratia pneumonia.  IV valproate for seizures.  Gentle diuresis.  Close hemodynamic monitoring.  Monitor for arrhythmias and monitor parameters for organ perfusion  Monitor neurologic status  Monitor renal function.  Head of the bed should remain elevated to 45 deg .   Chest PT and IS will be encouraged  Monitor adequacy of oxygenation and ventilation and attempt to wean oxygen  Nutritional goals will be met using po eventually , ensure adequate caloric intake and monitor the same  Stress ulcer and VTE prophylaxis will be achieved    Glycemic control is satisfactory  Electrolytes have been repleted as necessary and wound care has been carried out. Pain control has been achieved.   Aggressive physical therapy and early mobility and ambulation goals will be met   The family was updated about the course and plan  CRITICAL CARE TIME SPENT in evaluation and management, reassessments, review and interpretation of labs and x-rays, ventilator and hemodynamic management, formulating a plan and coordinating care: ___30____ MIN.  Time does not include procedural time.  CTICU ATTENDING     					    José Miguel Torres MD

## 2023-03-29 NOTE — SWALLOW BEDSIDE ASSESSMENT ADULT - NS SPL SWALLOW CLINIC TRIAL FT
Oral stage is significant for discoordinated bolus processing and delayed transit. Pharyngeal swallow was suspected to be timely with adequate hyolaryngeal movement on palpation and no overt signs of airway protection deficits. Pt noted to be impulsive at times and preferred to take large continuous cup sips. Pt was advised to take small controlled bites and sips. Recommend: initiate minced and moist diet with thin liquids. This service will monitor tolerance.

## 2023-03-29 NOTE — PROGRESS NOTE ADULT - SUBJECTIVE AND OBJECTIVE BOX
INTERVAL HPI/OVERNIGHT EVENTS:    3/20: AVR/arch replacement/CABG x 1/2nd stage TEVAR, aorto axillary bypass  EF 75%    53y MaleHPI:  52 year old male, current every day marijuana use with a past medical hx of HTN, type A aortic dissection s/p Dacron grafts, AV resuspension in 2013,CAD s/p CABG x 1 SVG to RCA (5/2013 with Dr. Medina), seizure disorder (last episode on 7/4/22) presents for a follow up visit for evaluation and management of his aortic pathology. Patient is referred by Dr. Jacqueline Gonsales. Screen failed for Triomphe study     CTA chest, abdomen and pelvis 11/30/22  Status post graft repair of the ascending aorta and portion of aortic arch.  Dissecting aneurysm of the descending thoracic aorta (measuring up to 5.7 cm) and abdominal aorta (3.5 cm infrarenal), similar to the MR angiogram of 9/19/2022.  Nonocclusive dissection flap present within the innominate artery, aneurysmal right subclavian artery and proximal right common carotid artery as well as aneurysmal left common iliac artery.    Patient denies fever, chills, fatigue, headache, blurred vision, dizziness, syncope, palpitations, shortness of breath, orthopnea, paroxysmal nocturnal dyspnea, nausea, vomiting, abdominal pain, back pain, BRBPR or swelling to legs.    Patient s/p diagnostic cardiac cath (3/9/23): SVG-RCA patent, remaining vessels luminal irregularities. ACCESS: L radial w/ TR band for hemostasis.     The patient was evaluated by CT surgery and is deemed a candidate for a reop total arch replacement with Thoraflex.    Patient seen in same day holding area; Reports no changes to PMHx or medications since last seen by our team. Denies acute or current SOB, chest pain, palpitation, N/V/D, fever/chills, recent illness, or any other concerning symptoms.  Admit under Dr. Pierre  via same day surgery. Consent signed, placed on chart.  Risks/benefits reviewed, patient understands and agrees. T&S ordered and blood products placed on hold for OR.  To 9EA  post-op.    to OR 3/20: intraop - 5L Crystalloid/7 units PRBC, 6unit FFP, 3unit platelet, 15unitc cryo, 1000cc fieba  arrived to ICU on Epi/Levo  postop - severe acidosis and Renal failure - lasix infusion started; bicarb given   EEG post-op and CT Head for poor mentation    CT Head 3/23: no acute intracranial abnormality   post-op Atrial fib - amiodarone & hypoxemia - bronch for pulm toilet  D10 started for noted hypoglycemia   CVVHD/Aquapharesis and later HD as needed - serial sessions for fluid removal to optimize before extubation     Extubated this am (7a) to [facility] - now 50/50  required Tito in setting of sedation for vent - hypertensive this am - now on cardene             PAST MEDICAL & SURGICAL HISTORY:  HTN (hypertension)      Aortic dissection      CAD (coronary artery disease)      Seizure disorder      S/P aortic bifurcation bypass graft      S/P CABG x 1            ICU Vital Signs Last 24 Hrs  T(C): 35.9 (29 Mar 2023 09:00), Max: 38.1 (28 Mar 2023 16:49)  T(F): 96.7 (29 Mar 2023 09:00), Max: 100.6 (28 Mar 2023 16:49)  HR: 124 (29 Mar 2023 10:00) (87 - 131)  BP: --  BP(mean): --  ABP: 143/68 (29 Mar 2023 10:00) (104/48 - 182/81)  ABP(mean): 89 (29 Mar 2023 10:00) (62 - 107)  RR: 20 (29 Mar 2023 10:00) (14 - 22)  SpO2: 99% (29 Mar 2023 10:00) (91% - 100%)    O2 Parameters below as of 29 Mar 2023 10:00  Patient On (Oxygen Delivery Method): nasal cannula, high flow  O2 Flow (L/min): 50  O2 Concentration (%): 50      Qtts:     I&O's Summary    28 Mar 2023 07:01  -  29 Mar 2023 07:00  --------------------------------------------------------  IN: 3451.4 mL / OUT: 3280 mL / NET: 171.4 mL    29 Mar 2023 07:01  -  29 Mar 2023 10:37  --------------------------------------------------------  IN: 260 mL / OUT: 350 mL / NET: -90 mL        Mode: CPAP with PS  FiO2: 40  PEEP: 5  PS: 5  MAP: 7  PIP: 13      Physical Exam    Heart  Lungs  Abd  Ext  Chest  Neuro  Skin    LABS:                        8.6    21.72 )-----------( 79       ( 29 Mar 2023 02:15 )             25.0     03-29    140  |  103  |  66<H>  ----------------------------<  137<H>  3.1<L>   |  23  |  3.23<H>    Ca    8.4      29 Mar 2023 02:15  Phos  4.0     03-29  Mg     2.0     03-29    TPro  4.6<L>  /  Alb  2.3<L>  /  TBili  1.3<H>  /  DBili  x   /  AST  32  /  ALT  8<L>  /  AlkPhos  107  03-29    PT/INR - ( 29 Mar 2023 02:15 )   PT: 16.3 sec;   INR: 1.37     PTT - ( 29 Mar 2023 02:15 )  PTT:28.8 sec    ABG - ( 29 Mar 2023 07:57 )  pH, Arterial: 7.35  pH, Blood: x     /  pCO2: 45    /  pO2: 128   / HCO3: 25    / Base Excess: -1.1  /  SaO2: 99.4      RADIOLOGY & ADDITIONAL STUDIES: reviewed     I have spent/provided stated minutes of critical care time to this patient: 90  INTERVAL HPI/OVERNIGHT EVENTS:    3/20: AVR/arch replacement/CABG x 1/2nd stage TEVAR, aorto axillary bypass  EF 75%    53y MaleHPI:  52 year old male, current every day marijuana use with a past medical hx of HTN, type A aortic dissection s/p Dacron grafts, AV resuspension in 2013,CAD s/p CABG x 1 SVG to RCA (5/2013 with Dr. Medina), Seizure disorder (last 7/4/22) presents for a follow up visit for evaluation and management of his aortic pathology. Patient is referred by Dr. Jacqueline Gonsales. Screen failed for Triomphe study     CTa C/A/P 11/30/22 - Status post graft repair of the ascending aorta and portion of aortic arch.  Dissecting aneurysm of the descending thoracic aorta (measuring up to 5.7 cm) and abdominal aorta (3.5 cm infrarenal), similar to the MR angiogram of 9/19/2022. Nonocclusive dissection flap present within the innominate artery, aneurysmal right subclavian artery and proximal right common carotid artery as well as aneurysmal left common iliac artery.    Cath (3/9/23): SVG-RCA patent, remaining vessels luminal irregularities. ACCESS: L radial w/ TR band for hemostasis.     to OR 3/20: intraop - 5L Crystalloid/7 units PRBC, 6unit FFP, 3unit platelet, 15unitc cryo, 1000cc fieba  arrived to ICU on Epi/Levo  postop - severe acidosis and Renal failure - lasix infusion started; bicarb given   EEG post-op and CT Head for poor mentation    CT Head 3/23: no acute intracranial abnormality   post-op Atrial fib - amiodarone & hypoxemia - bronch for pulm toilet  D10 started for noted hypoglycemia   CVVHD/Aquapharesis and later HD as needed - serial sessions for fluid removal to optimize before extubation     Extubated this am (7a) to HFNC - now 50/50  required Tito in setting of sedation for vent - hypertensive this am - now on cardene   remains NPO pending formal S/S         PMHx includes but is not limited to:   Hypertension  Aortic dissection  CAD (coronary artery disease)  Seizure disorder  S/P aortic bifurcation bypass graft  S/P CABG x 1    ICU Vital Signs Last 24 Hrs  T(C): 35.9 (29 Mar 2023 09:00), Max: 38.1 (28 Mar 2023 16:49)  T(F): 96.7 (29 Mar 2023 09:00), Max: 100.6 (28 Mar 2023 16:49)  HR: 124 (29 Mar 2023 10:00) (87 - 131) sinus   ABP: 143/68 (29 Mar 2023 10:00) (104/48 - 182/81)  ABP(mean): 89 (29 Mar 2023 10:00) (62 - 107)  RR: 20 (29 Mar 2023 10:00) (14 - 22)  SpO2: 99% (29 Mar 2023 10:00) (91% - 100%) HFNC 50/50    Qtts: Cardene     I&O's Summary    28 Mar 2023 07:01  -  29 Mar 2023 07:00  --------------------------------------------------------  IN: 3451.4 mL / OUT: 3280 mL / NET: 171.4 mL    29 Mar 2023 07:01  -  29 Mar 2023 10:37  --------------------------------------------------------  IN: 260 mL / OUT: 350 mL / NET: -90 mL        Mode: CPAP with PS  FiO2: 40  PEEP: 5  PS: 5  MAP: 7  PIP: 13      Physical Exam    Heart  Lungs  Abd  Ext  Chest  Neuro  Skin    LABS:                        8.6    21.72 )-----------( 79       ( 29 Mar 2023 02:15 )             25.0     03-29    140  |  103  |  66<H>  ----------------------------<  137<H>  3.1<L>   |  23  |  3.23<H>    Ca    8.4      29 Mar 2023 02:15  Phos  4.0     03-29  Mg     2.0     03-29    TPro  4.6<L>  /  Alb  2.3<L>  /  TBili  1.3<H>  /  DBili  x   /  AST  32  /  ALT  8<L>  /  AlkPhos  107  03-29    PT/INR - ( 29 Mar 2023 02:15 )   PT: 16.3 sec;   INR: 1.37     PTT - ( 29 Mar 2023 02:15 )  PTT:28.8 sec    ABG - ( 29 Mar 2023 07:57 )  pH, Arterial: 7.35  pH, Blood: x     /  pCO2: 45    /  pO2: 128   / HCO3: 25    / Base Excess: -1.1  /  SaO2: 99.4      RADIOLOGY & ADDITIONAL STUDIES: reviewed     I have spent/provided stated minutes of critical care time to this patient: 90  INTERVAL HPI/OVERNIGHT EVENTS:    3/20: AVR/arch replacement/CABG x 1/2nd stage TEVAR, aorto axillary bypass  EF 75%    53y MaleHPI:  52 year old male, current every day marijuana use with a past medical hx of HTN, type A aortic dissection s/p Dacron grafts, AV resuspension in 2013,CAD s/p CABG x 1 SVG to RCA (5/2013 with Dr. Medina), Seizure disorder (last 7/4/22) presents for a follow up visit for evaluation and management of his aortic pathology. Patient is referred by Dr. Jacqueline Gonsales. Screen failed for Triomphe study     CTa C/A/P 11/30/22 - Status post graft repair of the ascending aorta and portion of aortic arch.  Dissecting aneurysm of the descending thoracic aorta (measuring up to 5.7 cm) and abdominal aorta (3.5 cm infrarenal), similar to the MR angiogram of 9/19/2022. Nonocclusive dissection flap present within the innominate artery, aneurysmal right subclavian artery and proximal right common carotid artery as well as aneurysmal left common iliac artery.    Cath (3/9/23): SVG-RCA patent, remaining vessels luminal irregularities. ACCESS: L radial w/ TR band for hemostasis.     to OR 3/20: intraop - 5L Crystalloid/7 units PRBC, 6unit FFP, 3unit platelet, 15unitc cryo, 1000cc fieba  arrived to ICU on Epi/Levo  postop - severe acidosis and Renal failure - lasix infusion started; bicarb given   EEG post-op and CT Head for poor mentation    CT Head 3/23: no acute intracranial abnormality   post-op Atrial fib - amiodarone & hypoxemia - bronch for pulm toilet  D10 started for noted hypoglycemia   CVVHD/Aquapharesis and later HD as needed - serial sessions for fluid removal to optimize before extubation     Extubated this am (7a) to HFNC - now 50/50  required Tito in setting of sedation for vent - hypertensive this am - now on cardene   remains NPO pending formal S/S     patient awake and interactive - has zacarias sucking his cheek says he "likes it like that - helps him breathe"    able to move legs against gravity and lift head off bed      PMHx includes but is not limited to:   Hypertension  Aortic dissection  CAD (coronary artery disease)  Seizure disorder  S/P aortic bifurcation bypass graft  S/P CABG x 1    ICU Vital Signs Last 24 Hrs  T(C): 35.9 (29 Mar 2023 09:00), Max: 38.1 (28 Mar 2023 16:49)  T(F): 96.7 (29 Mar 2023 09:00), Max: 100.6 (28 Mar 2023 16:49)  HR: 124 (29 Mar 2023 10:00) (87 - 131) sinus tach  ABP: 143/68 (29 Mar 2023 10:00) (104/48 - 182/81)  ABP(mean): 89 (29 Mar 2023 10:00) (62 - 107)  RR: 20 (29 Mar 2023 10:00) (14 - 22)  SpO2: 99% (29 Mar 2023 10:00) (91% - 100%) HFNC 50/50    Qtts: Cardene     I&O's Summary    28 Mar 2023 07:01  -  29 Mar 2023 07:00  --------------------------------------------------------  IN: 3451.4 mL / OUT: 3280 mL / NET: 171.4 mL    29 Mar 2023 07:01  -  29 Mar 2023 10:37  --------------------------------------------------------  IN: 260 mL / OUT: 350 mL / NET: -90 mL    Physical Exam    Heart - sinus tach - no rub/gallop  Lungs - poor inspiratory effort - improves with prompting - no wheeze  Abd - (+)BS soft NTND (-)r/r/g  Ext - warm to touch; no cyanosis/clubbing   Neuro awake/response slowed but appropriate; nonfocal and moving all extremities to command  Skin - multiple skin tears - no rash     LABS:                        8.6    21.72 )-----------( 79       ( 29 Mar 2023 02:15 )             25.0     03-29    140  |  103  |  66<H>  ----------------------------<  137<H>  3.1<L>   |  23  |  3.23<H>    Ca    8.4      29 Mar 2023 02:15  Phos  4.0     03-29  Mg     2.0     03-29    TPro  4.6<L>  /  Alb  2.3<L>  /  TBili  1.3<H>  /  DBili  x   /  AST  32  /  ALT  8<L>  /  AlkPhos  107  03-29    PT/INR - ( 29 Mar 2023 02:15 )   PT: 16.3 sec;   INR: 1.37     PTT - ( 29 Mar 2023 02:15 )  PTT:28.8 sec    ABG - ( 29 Mar 2023 07:57 )  pH, Arterial: 7.35  pH, Blood: x     /  pCO2: 45    /  pO2: 128   / HCO3: 25    / Base Excess: -1.1  /  SaO2: 99.4      RADIOLOGY & ADDITIONAL STUDIES: reviewed   Blood Cx 3/25: (-)  Blood 3/23 (-) x 2    Patient with complicated operative history Chronic dissection of thoracic aorta/Aortic aneurysm without rupture now s/p complicated operative repair 3/20 - complicated post-op course as above now extubated on HFNC - severe hypertension on cardene at this time    1. CV  complicated operative repair  severe hypertension today - home meds note several agents  on cardene - start lopressor 5 IV q 6h standing - with hold parameters - titrate cardene down to off  ASA  Bumex intermittent dosing - monitor K and supplement prn     2. Pulm   Extubated this am - on HFNC at this time - d/w staff - to trial nasal cannula  plan OOB this afternoon - PT/OT orders in place  pulm toilet  Zosyn D#4  Sputum 3/27 & 3/25: moderate serratia     3. Endocrine  maintain glycemic control ; HgA1c 4.4    platelets recovered - start pharm DVT and Gi prophylaxis     plan obtain S/S later today and assess safety to intake po    d/w patient/staff and CTS      I have spent/provided stated minutes of critical care time to this patient: 90

## 2023-03-29 NOTE — SWALLOW BEDSIDE ASSESSMENT ADULT - SLP PERTINENT HISTORY OF CURRENT PROBLEM
graft repair of the ascending aorta and portion of aortic arch 2022; now s/p re-op total arch replacement

## 2023-03-29 NOTE — PROGRESS NOTE ADULT - ASSESSMENT
53Y M with non-oliguric iATN i/s/o replacement of aortic arch and TAVR on 3/20- prolonged OR time and massive transfusion and other blood products. Initiated on CVVHD (3/21-3/24) for clearance and volume. Started on diuresis on 3/24 with great response, required iHD 3/27 mostly UF , but during session drop in BP and increased HD, along with decreased UOP, likely due to removing fluid faster than plasma refill, now with stable lytes, adequate clearance and stable renal function, responding well with Bumex 2mg Q12hrs and albumin  with 3.2L UO/24hrs     Assessment/Plan:     #Non-oliguric iATN due to intra-op hemodynamic changes   BCr 1.2, eGFR 67 (3/9)  UA w/ trace proteinuria and large blood w/o RBCs. [id number]  CVVHD(3/21- 3/24)  iHD 3/27; 3.3 L   Pt still diuresing well w/ Bumex 2mg Q12 and albumin with adequate hourly urine output    Access; Rt IJ S/Q HD cath placed 3/27    Plan:  C/w Bumex 2mg Q12hrs with albumin with net negative goal 2-3L  No indication of HD today. Will continue to monitor  Strict I&Os  BMP per icu protocol  Maintain MAP >70 for renal perfusion

## 2023-03-30 LAB
ALBUMIN SERPL ELPH-MCNC: 2.5 G/DL — LOW (ref 3.3–5)
ALBUMIN SERPL ELPH-MCNC: 2.5 G/DL — LOW (ref 3.3–5)
ALP SERPL-CCNC: 64 U/L — SIGNIFICANT CHANGE UP (ref 40–120)
ALP SERPL-CCNC: 71 U/L — SIGNIFICANT CHANGE UP (ref 40–120)
ALT FLD-CCNC: 11 U/L — SIGNIFICANT CHANGE UP (ref 10–45)
ALT FLD-CCNC: 9 U/L — LOW (ref 10–45)
ANION GAP SERPL CALC-SCNC: 11 MMOL/L — SIGNIFICANT CHANGE UP (ref 5–17)
ANION GAP SERPL CALC-SCNC: 14 MMOL/L — SIGNIFICANT CHANGE UP (ref 5–17)
APTT BLD: 28.8 SEC — SIGNIFICANT CHANGE UP (ref 27.5–35.5)
APTT BLD: 41.3 SEC — HIGH (ref 27.5–35.5)
AST SERPL-CCNC: 26 U/L — SIGNIFICANT CHANGE UP (ref 10–40)
AST SERPL-CCNC: 26 U/L — SIGNIFICANT CHANGE UP (ref 10–40)
BILIRUB SERPL-MCNC: 1.1 MG/DL — SIGNIFICANT CHANGE UP (ref 0.2–1.2)
BILIRUB SERPL-MCNC: 1.3 MG/DL — HIGH (ref 0.2–1.2)
BUN SERPL-MCNC: 65 MG/DL — HIGH (ref 7–23)
BUN SERPL-MCNC: 65 MG/DL — HIGH (ref 7–23)
CALCIUM SERPL-MCNC: 8.1 MG/DL — LOW (ref 8.4–10.5)
CALCIUM SERPL-MCNC: 8.7 MG/DL — SIGNIFICANT CHANGE UP (ref 8.4–10.5)
CHLORIDE SERPL-SCNC: 103 MMOL/L — SIGNIFICANT CHANGE UP (ref 96–108)
CHLORIDE SERPL-SCNC: 107 MMOL/L — SIGNIFICANT CHANGE UP (ref 96–108)
CO2 SERPL-SCNC: 22 MMOL/L — SIGNIFICANT CHANGE UP (ref 22–31)
CO2 SERPL-SCNC: 24 MMOL/L — SIGNIFICANT CHANGE UP (ref 22–31)
CREAT SERPL-MCNC: 3.14 MG/DL — HIGH (ref 0.5–1.3)
CREAT SERPL-MCNC: 3.25 MG/DL — HIGH (ref 0.5–1.3)
EGFR: 22 ML/MIN/1.73M2 — LOW
EGFR: 23 ML/MIN/1.73M2 — LOW
GAS PNL BLDA: SIGNIFICANT CHANGE UP
GAS PNL BLDA: SIGNIFICANT CHANGE UP
GLUCOSE BLDC GLUCOMTR-MCNC: 91 MG/DL — SIGNIFICANT CHANGE UP (ref 70–99)
GLUCOSE SERPL-MCNC: 112 MG/DL — HIGH (ref 70–99)
GLUCOSE SERPL-MCNC: 118 MG/DL — HIGH (ref 70–99)
GRAM STN FLD: SIGNIFICANT CHANGE UP
HBV SURFACE AB SER-ACNC: SIGNIFICANT CHANGE UP
HCT VFR BLD CALC: 24.9 % — LOW (ref 39–50)
HCT VFR BLD CALC: 27.2 % — LOW (ref 39–50)
HGB BLD-MCNC: 8.5 G/DL — LOW (ref 13–17)
HGB BLD-MCNC: 9.1 G/DL — LOW (ref 13–17)
INR BLD: 1.33 — HIGH (ref 0.88–1.16)
INR BLD: 1.37 — HIGH (ref 0.88–1.16)
ISTAT ARTERIAL BE: -1 MMOL/L — SIGNIFICANT CHANGE UP (ref -2–3)
ISTAT ARTERIAL GLUCOSE: 111 MG/DL — HIGH (ref 70–99)
ISTAT ARTERIAL HCO3: 24 MMOL/L — SIGNIFICANT CHANGE UP (ref 22–26)
ISTAT ARTERIAL HEMATOCRIT: 24 % — LOW (ref 39–50)
ISTAT ARTERIAL HEMOGLOBIN: 8.2 G/DL — LOW (ref 13–17)
ISTAT ARTERIAL IONIZED CALCIUM: 1.16 MMOL/L — SIGNIFICANT CHANGE UP (ref 1.12–1.3)
ISTAT ARTERIAL PCO2: 36 MMHG — SIGNIFICANT CHANGE UP (ref 35–45)
ISTAT ARTERIAL PH: 7.42 — SIGNIFICANT CHANGE UP (ref 7.35–7.45)
ISTAT ARTERIAL PO2: 184 MMHG — HIGH (ref 80–105)
ISTAT ARTERIAL POTASSIUM: 3.3 MMOL/L — LOW (ref 3.5–5.3)
ISTAT ARTERIAL SO2: 100 % — HIGH (ref 95–98)
ISTAT ARTERIAL SODIUM: 138 MMOL/L — SIGNIFICANT CHANGE UP (ref 135–145)
ISTAT ARTERIAL TCO2: 25 MMOL/L — SIGNIFICANT CHANGE UP (ref 22–31)
LACTATE SERPL-SCNC: 1 MMOL/L — SIGNIFICANT CHANGE UP (ref 0.5–2)
MAGNESIUM SERPL-MCNC: 2.1 MG/DL — SIGNIFICANT CHANGE UP (ref 1.6–2.6)
MAGNESIUM SERPL-MCNC: 2.3 MG/DL — SIGNIFICANT CHANGE UP (ref 1.6–2.6)
MCHC RBC-ENTMCNC: 30.6 PG — SIGNIFICANT CHANGE UP (ref 27–34)
MCHC RBC-ENTMCNC: 31.1 PG — SIGNIFICANT CHANGE UP (ref 27–34)
MCHC RBC-ENTMCNC: 33.5 GM/DL — SIGNIFICANT CHANGE UP (ref 32–36)
MCHC RBC-ENTMCNC: 34.1 GM/DL — SIGNIFICANT CHANGE UP (ref 32–36)
MCV RBC AUTO: 91.2 FL — SIGNIFICANT CHANGE UP (ref 80–100)
MCV RBC AUTO: 91.6 FL — SIGNIFICANT CHANGE UP (ref 80–100)
NRBC # BLD: 0 /100 WBCS — SIGNIFICANT CHANGE UP (ref 0–0)
NRBC # BLD: 0 /100 WBCS — SIGNIFICANT CHANGE UP (ref 0–0)
PHOSPHATE SERPL-MCNC: 3.8 MG/DL — SIGNIFICANT CHANGE UP (ref 2.5–4.5)
PHOSPHATE SERPL-MCNC: 4 MG/DL — SIGNIFICANT CHANGE UP (ref 2.5–4.5)
PLATELET # BLD AUTO: 131 K/UL — LOW (ref 150–400)
PLATELET # BLD AUTO: 181 K/UL — SIGNIFICANT CHANGE UP (ref 150–400)
POTASSIUM SERPL-MCNC: 3.7 MMOL/L — SIGNIFICANT CHANGE UP (ref 3.5–5.3)
POTASSIUM SERPL-MCNC: 4 MMOL/L — SIGNIFICANT CHANGE UP (ref 3.5–5.3)
POTASSIUM SERPL-SCNC: 3.7 MMOL/L — SIGNIFICANT CHANGE UP (ref 3.5–5.3)
POTASSIUM SERPL-SCNC: 4 MMOL/L — SIGNIFICANT CHANGE UP (ref 3.5–5.3)
PROT SERPL-MCNC: 5 G/DL — LOW (ref 6–8.3)
PROT SERPL-MCNC: 5.3 G/DL — LOW (ref 6–8.3)
PROTHROM AB SERPL-ACNC: 15.9 SEC — HIGH (ref 10.5–13.4)
PROTHROM AB SERPL-ACNC: 16.4 SEC — HIGH (ref 10.5–13.4)
RBC # BLD: 2.73 M/UL — LOW (ref 4.2–5.8)
RBC # BLD: 2.97 M/UL — LOW (ref 4.2–5.8)
RBC # FLD: 15.4 % — HIGH (ref 10.3–14.5)
RBC # FLD: 15.8 % — HIGH (ref 10.3–14.5)
SODIUM SERPL-SCNC: 139 MMOL/L — SIGNIFICANT CHANGE UP (ref 135–145)
SODIUM SERPL-SCNC: 142 MMOL/L — SIGNIFICANT CHANGE UP (ref 135–145)
SPECIMEN SOURCE: SIGNIFICANT CHANGE UP
WBC # BLD: 25.51 K/UL — HIGH (ref 3.8–10.5)
WBC # BLD: 25.82 K/UL — HIGH (ref 3.8–10.5)
WBC # FLD AUTO: 25.51 K/UL — HIGH (ref 3.8–10.5)
WBC # FLD AUTO: 25.82 K/UL — HIGH (ref 3.8–10.5)

## 2023-03-30 PROCEDURE — 36556 INSERT NON-TUNNEL CV CATH: CPT

## 2023-03-30 PROCEDURE — 99291 CRITICAL CARE FIRST HOUR: CPT | Mod: 25

## 2023-03-30 PROCEDURE — 32557 INSERT CATH PLEURA W/ IMAGE: CPT

## 2023-03-30 PROCEDURE — 99292 CRITICAL CARE ADDL 30 MIN: CPT | Mod: 25

## 2023-03-30 PROCEDURE — 31645 BRNCHSC W/THER ASPIR 1ST: CPT

## 2023-03-30 PROCEDURE — 76937 US GUIDE VASCULAR ACCESS: CPT | Mod: 26

## 2023-03-30 RX ORDER — DEXMEDETOMIDINE HYDROCHLORIDE IN 0.9% SODIUM CHLORIDE 4 UG/ML
0.5 INJECTION INTRAVENOUS
Qty: 400 | Refills: 0 | Status: DISCONTINUED | OUTPATIENT
Start: 2023-03-30 | End: 2023-03-31

## 2023-03-30 RX ORDER — MIDAZOLAM HYDROCHLORIDE 1 MG/ML
4 INJECTION, SOLUTION INTRAMUSCULAR; INTRAVENOUS ONCE
Refills: 0 | Status: DISCONTINUED | OUTPATIENT
Start: 2023-03-30 | End: 2023-03-30

## 2023-03-30 RX ORDER — POTASSIUM CHLORIDE 20 MEQ
10 PACKET (EA) ORAL ONCE
Refills: 0 | Status: COMPLETED | OUTPATIENT
Start: 2023-03-30 | End: 2023-03-30

## 2023-03-30 RX ORDER — MODAFINIL 200 MG/1
200 TABLET ORAL DAILY
Refills: 0 | Status: DISCONTINUED | OUTPATIENT
Start: 2023-03-31 | End: 2023-03-31

## 2023-03-30 RX ADMIN — Medication 5 MILLIGRAM(S): at 05:29

## 2023-03-30 RX ADMIN — FENTANYL CITRATE 25 MICROGRAM(S): 50 INJECTION INTRAVENOUS at 04:02

## 2023-03-30 RX ADMIN — FENTANYL CITRATE 25 MICROGRAM(S): 50 INJECTION INTRAVENOUS at 07:00

## 2023-03-30 RX ADMIN — FENTANYL CITRATE 25 MICROGRAM(S): 50 INJECTION INTRAVENOUS at 12:47

## 2023-03-30 RX ADMIN — FENTANYL CITRATE 25 MICROGRAM(S): 50 INJECTION INTRAVENOUS at 17:25

## 2023-03-30 RX ADMIN — BUMETANIDE 2 MILLIGRAM(S): 0.25 INJECTION INTRAMUSCULAR; INTRAVENOUS at 01:24

## 2023-03-30 RX ADMIN — HEPARIN SODIUM 5000 UNIT(S): 5000 INJECTION INTRAVENOUS; SUBCUTANEOUS at 22:20

## 2023-03-30 RX ADMIN — FENTANYL CITRATE 25 MICROGRAM(S): 50 INJECTION INTRAVENOUS at 19:44

## 2023-03-30 RX ADMIN — FENTANYL CITRATE 25 MICROGRAM(S): 50 INJECTION INTRAVENOUS at 13:17

## 2023-03-30 RX ADMIN — FENTANYL CITRATE 25 MICROGRAM(S): 50 INJECTION INTRAVENOUS at 00:02

## 2023-03-30 RX ADMIN — HEPARIN SODIUM 5000 UNIT(S): 5000 INJECTION INTRAVENOUS; SUBCUTANEOUS at 05:29

## 2023-03-30 RX ADMIN — FENTANYL CITRATE 25 MICROGRAM(S): 50 INJECTION INTRAVENOUS at 11:00

## 2023-03-30 RX ADMIN — FENTANYL CITRATE 25 MICROGRAM(S): 50 INJECTION INTRAVENOUS at 10:13

## 2023-03-30 RX ADMIN — BUMETANIDE 2 MILLIGRAM(S): 0.25 INJECTION INTRAMUSCULAR; INTRAVENOUS at 15:26

## 2023-03-30 RX ADMIN — FENTANYL CITRATE 25 MICROGRAM(S): 50 INJECTION INTRAVENOUS at 18:00

## 2023-03-30 RX ADMIN — CHLORHEXIDINE GLUCONATE 1 APPLICATION(S): 213 SOLUTION TOPICAL at 05:58

## 2023-03-30 RX ADMIN — LACOSAMIDE 120 MILLIGRAM(S): 50 TABLET ORAL at 07:40

## 2023-03-30 RX ADMIN — Medication 57.5 MILLIGRAM(S): at 06:50

## 2023-03-30 RX ADMIN — MIDAZOLAM HYDROCHLORIDE 4 MILLIGRAM(S): 1 INJECTION, SOLUTION INTRAMUSCULAR; INTRAVENOUS at 22:34

## 2023-03-30 RX ADMIN — PIPERACILLIN AND TAZOBACTAM 25 GRAM(S): 4; .5 INJECTION, POWDER, LYOPHILIZED, FOR SOLUTION INTRAVENOUS at 15:26

## 2023-03-30 RX ADMIN — HEPARIN SODIUM 5000 UNIT(S): 5000 INJECTION INTRAVENOUS; SUBCUTANEOUS at 15:56

## 2023-03-30 RX ADMIN — Medication 81 MILLIGRAM(S): at 11:55

## 2023-03-30 RX ADMIN — Medication 100 MILLIEQUIVALENT(S): at 17:52

## 2023-03-30 RX ADMIN — FENTANYL CITRATE 25 MICROGRAM(S): 50 INJECTION INTRAVENOUS at 03:47

## 2023-03-30 RX ADMIN — FENTANYL CITRATE 25 MICROGRAM(S): 50 INJECTION INTRAVENOUS at 20:00

## 2023-03-30 RX ADMIN — PIPERACILLIN AND TAZOBACTAM 25 GRAM(S): 4; .5 INJECTION, POWDER, LYOPHILIZED, FOR SOLUTION INTRAVENOUS at 05:29

## 2023-03-30 RX ADMIN — FENTANYL CITRATE 25 MICROGRAM(S): 50 INJECTION INTRAVENOUS at 07:15

## 2023-03-30 RX ADMIN — PIPERACILLIN AND TAZOBACTAM 25 GRAM(S): 4; .5 INJECTION, POWDER, LYOPHILIZED, FOR SOLUTION INTRAVENOUS at 22:20

## 2023-03-30 RX ADMIN — PANTOPRAZOLE SODIUM 40 MILLIGRAM(S): 20 TABLET, DELAYED RELEASE ORAL at 11:55

## 2023-03-30 RX ADMIN — DEXMEDETOMIDINE HYDROCHLORIDE IN 0.9% SODIUM CHLORIDE 9.64 MICROGRAM(S)/KG/HR: 4 INJECTION INTRAVENOUS at 22:20

## 2023-03-30 NOTE — PROGRESS NOTE ADULT - SUBJECTIVE AND OBJECTIVE BOX
CTICU  CRITICAL  CARE  attending     Hand off received 					   Pertinent clinical, laboratory, radiographic, hemodynamic, echocardiographic, respiratory data, microbiologic data and chart were reviewed and analyzed frequently throughout the course of the day  Patient seen and examined with CTS/ SH attending at bedside  Pt is a 53yr old male with PMH HTN, type A dissection sp Dacron grafts and AV resuspension (2013), CAD sp CABG x 1 ([facility], 5/2013, SVG-RCA), seizures, admitted for surgical mgmt of progression of aneurysmal disease. Patient underwent replacement of transverse aortic arch, second stage thoracic endovascular aortic repair, aorto-axillary bypass, AV replacement (bio 23mm), and CABG x 1 (SVG-RCA) EF 75% with Dr. Pierre 3/20. Patient received 7 units pRBC, 6 FFP, 4 plt, 15 cryo, 1000 FEIBA, and uo 1300cc. Started on lasix infusion and phenylephrine, bicarb, Armaan, levo and vaso, with lactic acidosis. 3/21 L femoral HD cath placed and CVVHD started with improvement in acidosis. Pressors off by end of day. Primacor weaned overnight. Patient woke up and follows commands, exhibited facial twitching cw prior seizures per wife and ativan given followed by vEEG placement. Neurology consulted for recs given subtherapeutic AED levels. Given 2 units pRBC. 3/22 Neurology recommended dc vEEG given low suspicion for seizures. Fluid removal initiated with CVVHD. Overnight poor oxygenation requiring bronchoscopy, on lqauita and vaso, D10 started for hypoglycemia. 3/23 large residuals, reglan started. CTH performed for poor mentation-no bleed. Amio given for afib. Received 1 unit pRBC and 1 plt, femoral a line removed. CVVHD stopped and diuretic initiated with good response. Primacor turned off. 3/25 new TLC and R cordis with swan placed, tolerated cpap. D10 for hypoglycemia. Underwent aquaphoresis via new LIJ HD cath. Low grade fevers. Overnight swan dc'd. 3/27 New RIJ HDC placed for malfunctioning LIJ, underwent HD with 3.5L removed. Junctional rhythm overnight. 3/29 extubated to HFNC, passed SLP. 3/30 placed on BIPAP for tachypnea, underwent bronchoscopy, new LIJ TLC placed for access, HD performed. TF via dobhoff. L pigtail placed with 700cc out.       FAMILY HISTORY:  No pertinent family history in first degree relatives  PAST MEDICAL & SURGICAL HISTORY:  HTN (hypertension)  Aortic dissection  CAD (coronary artery disease)  Seizure disorder  S/P aortic bifurcation bypass graft  S/P CABG x 1        Patient is a 53y old  Male who presents with a chief complaint of expanding aortic arch and descending aortic aneurysm.      14 system review was unremarkable    Vital signs, hemodynamic and respiratory parameters were reviewed from the bedside nursing flowsheet.  ICU Vital Signs Last 24 Hrs  T(C): 36.4 (30 Mar 2023 20:57), Max: 37.3 (30 Mar 2023 17:07)  T(F): 97.5 (30 Mar 2023 20:57), Max: 99.1 (30 Mar 2023 17:07)  HR: 119 (30 Mar 2023 22:00) (88 - 132)  BP: 148/91 (30 Mar 2023 21:00) (116/82 - 167/90)  BP(mean): 111 (30 Mar 2023 21:00) (96 - 124)  ABP: 128/83 (30 Mar 2023 22:00) (103/89 - 177/89)  ABP(mean): 99 (30 Mar 2023 22:00) (87 - 114)  RR: 20 (30 Mar 2023 22:00) (18 - 28)  SpO2: 100% (30 Mar 2023 22:00) (64% - 100%)    O2 Parameters below as of 30 Mar 2023 23:00  Patient On (Oxygen Delivery Method): BiPAP/CPAP    O2 Concentration (%): 70      Adult Advanced Hemodynamics Last 24 Hrs  CVP(mm Hg): -10 (30 Mar 2023 22:00) (-10 - 27)  CVP(cm H2O): --  CO: --  CI: --  PA: --  PA(mean): --  PCWP: --  SVR: --  SVRI: --  PVR: --  PVRI: --, ABG - ( 30 Mar 2023 13:14 )  pH, Arterial: 7.39  pH, Blood: x     /  pCO2: 36    /  pO2: 178   / HCO3: 22    / Base Excess: -2.6  /  SaO2: 99.5                Intake and output was reviewed and the fluid balance was calculated  Daily     Daily   I&O's Summary    29 Mar 2023 07:01  -  30 Mar 2023 07:00  --------------------------------------------------------  IN: 2321.2 mL / OUT: 3165 mL / NET: -843.8 mL    30 Mar 2023 07:01  -  30 Mar 2023 22:47  --------------------------------------------------------  IN: 664.6 mL / OUT: 4325 mL / NET: -3660.4 mL        All lines and drain sites were assessed  Glycemic trend was reviewed    POCT Blood Glucose.: 91 mg/dL (30 Mar 2023 06:04)      Neuro: follows commands  HEENT: bipap  Heart: s1 s2   Lungs: clear bl  Abdomen: soft nt nd  Extremities: 2+dp    Lines:  LIJ TLC 3/30  RIJ HD cath 3/27  R radial arterial line 3/20    Tubes:  Med bella x 3  Pleural bella x 2  L pigtail      labs  CBC Full  -  ( 30 Mar 2023 13:03 )  WBC Count : 25.51 K/uL  RBC Count : 2.73 M/uL  Hemoglobin : 8.5 g/dL  Hematocrit : 24.9 %  Platelet Count - Automated : 181 K/uL  Mean Cell Volume : 91.2 fl  Mean Cell Hemoglobin : 31.1 pg  Mean Cell Hemoglobin Concentration : 34.1 gm/dL  Auto Neutrophil # : x  Auto Lymphocyte # : x  Auto Monocyte # : x  Auto Eosinophil # : x  Auto Basophil # : x  Auto Neutrophil % : x  Auto Lymphocyte % : x  Auto Monocyte % : x  Auto Eosinophil % : x  Auto Basophil % : x    03-30    139  |  103  |  65<H>  ----------------------------<  118<H>  3.7   |  22  |  3.25<H>    Ca    8.1<L>      30 Mar 2023 13:03  Phos  3.8     03-30  Mg     2.1     03-30    TPro  5.0<L>  /  Alb  2.5<L>  /  TBili  1.3<H>  /  DBili  x   /  AST  26  /  ALT  11  /  AlkPhos  64  03-30    PT/INR - ( 30 Mar 2023 13:03 )   PT: 15.9 sec;   INR: 1.33          PTT - ( 30 Mar 2023 13:03 )  PTT:41.3 sec  The current medications were reviewed   MEDICATIONS  (STANDING):  aspirin  chewable 81 milliGRAM(s) Oral daily  buMETAnide Injectable 2 milliGRAM(s) IV Push every 12 hours  chlorhexidine 2% Cloths 1 Application(s) Topical daily  dexMEDEtomidine Infusion 0.5 MICROgram(s)/kG/Hr (9.64 mL/Hr) IV Continuous <Continuous>  dextrose 5%. 1000 milliLiter(s) (50 mL/Hr) IV Continuous <Continuous>  dextrose 5%. 1000 milliLiter(s) (100 mL/Hr) IV Continuous <Continuous>  dextrose 50% Injectable 50 milliLiter(s) IV Push every 15 minutes  dextrose 50% Injectable 25 milliLiter(s) IV Push every 15 minutes  glucagon  Injectable 1 milliGRAM(s) IntraMuscular once  heparin   Injectable 5000 Unit(s) SubCutaneous every 8 hours  insulin lispro (ADMELOG) corrective regimen sliding scale   SubCutaneous every 6 hours  lacosamide IVPB 100 milliGRAM(s) IV Intermittent every 12 hours  metoprolol tartrate Injectable 5 milliGRAM(s) IV Push every 6 hours  pantoprazole  Injectable 40 milliGRAM(s) IV Push daily  piperacillin/tazobactam IVPB.. 3.375 Gram(s) IV Intermittent every 8 hours  sodium chloride 0.9%. 1000 milliLiter(s) (10 mL/Hr) IV Continuous <Continuous>  testosterone patch 4 mG/24 Hr(s) 4 milliGRAM(s) Transdermal daily  valproate sodium  IVPB 750 milliGRAM(s) IV Intermittent every 12 hours    MEDICATIONS  (PRN):  dextrose Oral Gel 15 Gram(s) Oral once PRN Blood Glucose LESS THAN 70 milliGRAM(s)/deciliter  fentaNYL    Injectable 25 MICROGram(s) IV Push every 3 hours PRN Severe Pain (7 - 10)      Assessment/Plan:  53yr old male with PMH HTN, type A dissection sp Dacron grafts and AV resuspension (2013), CAD sp CABG x 1 ([facility], 5/2013, SVG-RCA), seizures, admitted for surgical mgmt of progression of aneurysmal disease.     POD10 replacement of transverse aortic arch, second stage thoracic endovascular aortic repair, aorto-axillary bypass, AV replacement (bio 23mm), and CABG x 1 (SVG-RCA) ([facility]  NIPPV prn  ATN  Diuresing on bumex-will switch to q12   Serratia in BAL, on zosyn since 3/26, would do 7d course  Continue AEDs  Acute post operative anemia-trend H/H  Thrombocytopenia-improving  Replete lytes prn  Monitor CT output  Tube feeds, increase to goal as tolerated  GI/DVT PPX  Bowel Regimen  Pain control  OOB with PT  Close hemodynamic, ventilatory and drain monitoring and management per post op routine  Monitor for arrhythmias and monitor parameters for organ perfusion  Monitor neurologic status  Head of the bed should remain elevated to 45 deg   Chest PT and IS will be encouraged  Monitor adequacy of oxygenation and ventilation and attempt to wean oxygen  Antibiotic regimen will be tailored to the clinical, laboratory and microbiologic data  Nutritional goals will be met using po eventually, ensure adequate caloric intake and monitor the same  Stress ulcer and VTE prophylaxis will be achieved    Glycemic control is satisfactory  Electrolytes have been repleted as necessary and wound care has been carried out   Pain control has been achieved.   Aggressive physical therapy and early mobility and ambulation goals will be met   The family was updated about the course and plan.    CRITICAL CARE TIME personally provided by me  in evaluation and management, reassessments, review and interpretation of labs and x-rays, ventilator and hemodynamic management, formulating a plan and coordinating care: ___30___ MIN.  Time does not include procedural time.    CTICU ATTENDING     					  Alexx Brooks MD

## 2023-03-30 NOTE — PROGRESS NOTE ADULT - SUBJECTIVE AND OBJECTIVE BOX
CTICU  CRITICAL  CARE  attending     Hand off received 					   Pertinent clinical, laboratory, radiographic, hemodynamic, echocardiographic, respiratory data, microbiologic data and chart were reviewed and analyzed frequently throughout the course of the day and night  Patient seen and examined with CTS/ SH attending at bedside  Pt is a 53y , Male, HEALTH ISSUES - PROBLEM Dx:      , FAMILY HISTORY:  No pertinent family history in first degree relatives    PAST MEDICAL & SURGICAL HISTORY:  HTN (hypertension)      Aortic dissection      CAD (coronary artery disease)      Seizure disorder      S/P aortic bifurcation bypass graft      S/P CABG x 1        Patient is a 53y old  Male who presents with a chief complaint of expanding aortic arch and descending aortic aneurysm (30 Mar 2023 18:59)      14 system review was unremarkable    Vital signs, hemodynamic and respiratory parameters were reviewed from the bedside nursing flowsheet.  ICU Vital Signs Last 24 Hrs  T(C): 36.4 (30 Mar 2023 20:57), Max: 37.3 (30 Mar 2023 17:07)  T(F): 97.5 (30 Mar 2023 20:57), Max: 99.1 (30 Mar 2023 17:07)  HR: 118 (30 Mar 2023 21:00) (88 - 132)  BP: 148/91 (30 Mar 2023 21:00) (116/82 - 167/90)  BP(mean): 111 (30 Mar 2023 21:00) (96 - 124)  ABP: 116/89 (30 Mar 2023 21:00) (103/89 - 177/89)  ABP(mean): 101 (30 Mar 2023 21:00) (87 - 114)  RR: 22 (30 Mar 2023 21:00) (18 - 28)  SpO2: 100% (30 Mar 2023 21:00) (64% - 100%)    O2 Parameters below as of 30 Mar 2023 21:00  Patient On (Oxygen Delivery Method): BiPAP/CPAP    O2 Concentration (%): 70      Adult Advanced Hemodynamics Last 24 Hrs  CVP(mm Hg): 6 (30 Mar 2023 21:00) (2 - 27)  CVP(cm H2O): --  CO: --  CI: --  PA: --  PA(mean): --  PCWP: --  SVR: --  SVRI: --  PVR: --  PVRI: --, ABG - ( 30 Mar 2023 13:14 )  pH, Arterial: 7.39  pH, Blood: x     /  pCO2: 36    /  pO2: 178   / HCO3: 22    / Base Excess: -2.6  /  SaO2: 99.5                Intake and output was reviewed and the fluid balance was calculated  Daily     Daily   I&O's Summary    29 Mar 2023 07:01  -  30 Mar 2023 07:00  --------------------------------------------------------  IN: 2321.2 mL / OUT: 3165 mL / NET: -843.8 mL    30 Mar 2023 07:01  -  30 Mar 2023 22:01  --------------------------------------------------------  IN: 635 mL / OUT: 4325 mL / NET: -3690 mL        All lines and drain sites were assessed  [facility]      POCT Blood Glucose.: 91 mg/dL (30 Mar 2023 06:04)    No acute change in mental status  Auscultation of the chest reveals equal bs  Abdomen is soft  Extremities are warm and well perfused  Wounds appear clean and unremarkable  Antibiotics are periop    labs  CBC Full  -  ( 30 Mar 2023 13:03 )  WBC Count : 25.51 K/uL  RBC Count : 2.73 M/uL  Hemoglobin : 8.5 g/dL  Hematocrit : 24.9 %  Platelet Count - Automated : 181 K/uL  Mean Cell Volume : 91.2 fl  Mean Cell Hemoglobin : 31.1 pg  Mean Cell Hemoglobin Concentration : 34.1 gm/dL  Auto Neutrophil # : x  Auto Lymphocyte # : x  Auto Monocyte # : x  Auto Eosinophil # : x  Auto Basophil # : x  Auto Neutrophil % : x  Auto Lymphocyte % : x  Auto Monocyte % : x  Auto Eosinophil % : x  Auto Basophil % : x    03-30    139  |  103  |  65<H>  ----------------------------<  118<H>  3.7   |  22  |  3.25<H>    Ca    8.1<L>      30 Mar 2023 13:03  Phos  3.8     03-30  Mg     2.1     03-30    TPro  5.0<L>  /  Alb  2.5<L>  /  TBili  1.3<H>  /  DBili  x   /  AST  26  /  ALT  11  /  AlkPhos  64  03-30    PT/INR - ( 30 Mar 2023 13:03 )   PT: 15.9 sec;   INR: 1.33          PTT - ( 30 Mar 2023 13:03 )  PTT:41.3 sec  The current medications were reviewed   MEDICATIONS  (STANDING):  aspirin  chewable 81 milliGRAM(s) Oral daily  buMETAnide Injectable 2 milliGRAM(s) IV Push every 12 hours  chlorhexidine 2% Cloths 1 Application(s) Topical daily  dextrose 5%. 1000 milliLiter(s) (50 mL/Hr) IV Continuous <Continuous>  dextrose 5%. 1000 milliLiter(s) (100 mL/Hr) IV Continuous <Continuous>  dextrose 50% Injectable 50 milliLiter(s) IV Push every 15 minutes  dextrose 50% Injectable 25 milliLiter(s) IV Push every 15 minutes  glucagon  Injectable 1 milliGRAM(s) IntraMuscular once  heparin   Injectable 5000 Unit(s) SubCutaneous every 8 hours  insulin lispro (ADMELOG) corrective regimen sliding scale   SubCutaneous every 6 hours  lacosamide IVPB 100 milliGRAM(s) IV Intermittent every 12 hours  metoprolol tartrate Injectable 5 milliGRAM(s) IV Push every 6 hours  pantoprazole  Injectable 40 milliGRAM(s) IV Push daily  piperacillin/tazobactam IVPB.. 3.375 Gram(s) IV Intermittent every 8 hours  sodium chloride 0.9%. 1000 milliLiter(s) (10 mL/Hr) IV Continuous <Continuous>  valproate sodium  IVPB 750 milliGRAM(s) IV Intermittent every 12 hours    MEDICATIONS  (PRN):  dextrose Oral Gel 15 Gram(s) Oral once PRN Blood Glucose LESS THAN 70 milliGRAM(s)/deciliter  fentaNYL    Injectable 25 MICROGram(s) IV Push every 3 hours PRN Severe Pain (7 - 10)       PROBLEM LIST/ ASSESSMENT:  HEALTH ISSUES - PROBLEM Dx:      ,   Patient is a 53y old  Male who presents with a chief complaint of expanding aortic arch and descending aortic aneurysm (30 Mar 2023 18:59)     s/p cardiac surgery    Acute systolic and diastolic heart failure evidenced by SOB and parenchymal infiltrates; will treat with diuresis      Acute blood loss anemia with relative hypotension treated with > 1 unit PC    Acute respiratory failure ruled in due to hypoxemia, O2 sats < 91% on RA treated with HFNC    Toxic metabolic encephalopathy ; sundowning due to anesthesia pain medications    Acidosis evidenced by anion gap and negative base excess    Acute kidney injury - creatinine > 0.3 due to combined prerenal and intrarenal factors can presume ATN          My plan includes :  close hemodynamic, ventilatory and drain monitoring and management per post op routine    Monitor for arrhythmias and monitor parameters for organ perfusion  beta blockade not administered due to hemodynamic instability and bradycardia  monitor neurologic status  Head of the bed should remain elevated to 45 deg .   chest PT and IS will be encouraged  monitor adequacy of oxygenation and ventilation and attempt to wean oxygen  antibiotic regimen will be tailored to the clinical, laboratory and microbiologic data  Nutritional goals will be met using po eventually , ensure adequate caloric intake and montior the same  Stress ulcer and VTE prophylaxis will be achieved    Glycemic control is satisfactory  Electrolytes have been repleted as necessary and wound care has been carried out. Pain control has been achieved.   agressive physical therapy and early mobility and ambulation goals will be met   The family was updated about the course and plan  CRITICAL CARE TIME personally provided by me  in evaluation and management, reassessments, review and interpretation of labs and x-rays, ventilator and hemodynamic management, formulating a plan and coordinating care: ___110___ MIN.  Time does not include procedural time.  CTICU ATTENDING     					    Bubba Craft MD

## 2023-03-30 NOTE — PROCEDURE NOTE - NSCVLATTEMPTSITEVASC_A_CORE
left/femoral vein
left/internal jugular
right/internal jugular
right/internal jugular
left/internal jugular
right/internal jugular

## 2023-03-30 NOTE — PROGRESS NOTE ADULT - SUBJECTIVE AND OBJECTIVE BOX
Hemoglobin: 8.5 g/dL (23 @ 13:03)  Phosphorus Level, Serum: 3.8 mg/dL (23 @ 13:03)  Phosphorus Level, Serum: 4.0 mg/dL (23 @ 00:57)  Hemoglobin: 9.1 g/dL (23 @ 00:57)    Albumin, Serum: 2.5 g/dL (23 @ 13:03)  Hepatitis B Surface Antibody: Nonreact (23 @ 12:02)    T(C): 37.3 (23 @ 17:07), Max: 37.3 (23 @ 17:07)  HR: 110 (23 @ 17:00) (88 - 132)  BP: 143/85 (23 @ 17:00) (116/82 - 167/90)  RR: 22 (23 @ 17:00) (18 - 28)  SpO2: 95% (23 @ 17:00) (64% - 100%)      Hemodialysis Treatment.:     Schedule: Once, Modality: Hemodialysis, Access: Internal Jugular Central Venous Catheter    Dialyzer: Optiflux [id number], Time: 210 Min    Blood Flow: 400 mL/Min , Dialysate Flow: 500 mL/Min, Dialysate Temp: 36.5, Tubinmm (Adult)    Target Fluid Removal: 2 Liters    Dialysate Electrolytes (mEq/L): Potassium 3, Calcium 2.5, Sodium 138, Bicarbonate 35    Additional Instructions: albumin w/ HD  If BP remains>140, increase UF to 3 L (23 @ 09:37) [Completed]      Seen on HD. Tolerating well. BP stable. Getting a unit of pRBC w/ HD. Increased UF to 2.9 L. Continue as above

## 2023-03-30 NOTE — CHART NOTE - NSCHARTNOTEFT_GEN_A_CORE
Admitting Diagnosis:   Patient is a 53y old  Male who presents with a chief complaint of expanding aortic arch and descending aortic aneurysm (26 Mar 2023 09:39)      PAST MEDICAL & SURGICAL HISTORY:  HTN (hypertension)      Aortic dissection      CAD (coronary artery disease)      Seizure disorder      S/P aortic bifurcation bypass graft      S/P CABG x 1      Current Nutrition Order: Minced and moist    PO Intake: Good (%) [   ]  Fair (50-75%) [ X  ] Poor (<25%) [   ]    GI Issues: Diarrhea improving, no N/V/C    Pain: Intermittent pain noted, team aware    Skin Integrity:   Sandro 13  generalized mild edema, 2+ L/R arm  R. groin wound, L. leg wound  Stage 2 PU sacrum    Labs:   03-30    142  |  107  |  65<H>  ----------------------------<  112<H>  4.0   |  24  |  3.14<H>    Ca    8.7      30 Mar 2023 00:57  Phos  4.0     03-30  Mg     2.3     03-30    TPro  5.3<L>  /  Alb  2.5<L>  /  TBili  1.1  /  DBili  x   /  AST  26  /  ALT  9<L>  /  AlkPhos  71  03-30      Medications:  aspirin  chewable 81 daily  buMETAnide Injectable 2 every 12 hours  chlorhexidine 2% Cloths 1 daily  dextrose 5%. 1000 <Continuous>  dextrose 5%. 1000 <Continuous>  dextrose 50% Injectable 50 every 15 minutes  dextrose 50% Injectable 25 every 15 minutes  dextrose Oral Gel 15 once PRN  fentaNYL    Injectable 25 every 3 hours PRN  glucagon  Injectable 1 once  heparin   Injectable 5000 every 8 hours  insulin lispro (ADMELOG) corrective regimen sliding scale  every 6 hours  lacosamide IVPB 100 every 12 hours  metoprolol tartrate Injectable 5 every 6 hours  pantoprazole  Injectable 40 daily  piperacillin/tazobactam IVPB.. 3.375 every 8 hours  sodium chloride 0.9%. 1000 <Continuous>  valproate sodium  IVPB 750 every 12 hours    Weight: 77.1kg    Weight Change:   3/27 79.1kg, 75.8kg - fluid fluctuations noted    Nutrition Focused Physical Exam: Completed [   ]  Not Pertinent [ X  ]    Estimated energy needs: Height 72"; ABW 77.1kg; IBW 81kg; 95%IBW; BMI 23  IBW used to estimate needs based on clinical judgement/anasarca. Needs estimated for age and adjusted for vent demands, post-op/wound healing. Moderate protein 2/2 TREY. Fluids per team     Estimated Energy Needs Weight (lbs)	178 lb  Estimated Energy Needs Weight (kg)	80.7 kg  Estimated Energy Needs From (ifeanyi/kg)	25   Estimated Energy Needs To (ifeanyi/kg)	30   Estimated Energy Needs Calculated From (ifeanyi/kg)	2017   Estimated Energy Needs Calculated To (ifeanyi/kg)	2421     Estimated Protein Needs Weight (lbs)	178 lb  Estimated Protein Needs Weight (kg)	80.7 kg  Estimated Protein Needs From (g/kg)	1.2  Estimated Protein Needs To (g/kg)	1.4  Estimated Protein Needs Calculated From (g/kg)	97.2  Estimated Protein Needs Calculated To (g/kg)	113    Subjective:   Pt is a 53yr old male with PMH HTN, type A dissection sp Dacron grafts and AV resuspension (2013), CAD sp CABG x 1 ([facility], 5/2013, SVG-RCA), seizures, admitted for surgical mgmt of progression of aneurysmal disease. Patient underwent replacement of transverse aortic arch, second stage thoracic endovascular aortic repair, aorto-axillary bypass, AV replacement (bio 23mm), and CABG x 1 (SVG-RCA) EF 75% with Dr. Pierre 3/20. Patient received 7 units pRBC, 6 FFP, 4 plt, 15 cryo, 1000 FEIBA, and uo 1300cc. Started on lasix infusion and phenylephrine, bicarb, rAmaan, levo and vaso, with lactic acidosis. 3/21 L femoral HD cath placed and CVVHD started with improvement in acidosis. Pressors off by end of day. Primacor weaned overnight. Patient woke up and follows commands, exhibited facial twitching cw prior seizures per wife and ativan given followed by vEEG placement. Neurology consulted for recs given subtherapeutic AED levels. Given 2 units pRBC. 3/22 Neurology recommended dc vEEG given low suspicion for seizures. Fluid removal initiated with CVVHD. Overnight poor oxygenation requiring bronchoscopy, on laquita and vaso, D10 started for hypoglycemia. 3/23 large residuals, reglan started. CTH performed for poor mentation-no bleed. Amio given for afib. Received 1 unit pRBC and 1 plt, femoral a line removed. CVVHD stopped and diuretic initiated with good response. Primacor turned off. 3/25 new TLC and R cordis with swan placed, tolerated cpap. D10 for hypoglycemia. Underwent aquaphoresis via new [facility] HD cath. Now with stable lytes, adequate clearance and stable renal function, responding well with Bumex 2mg Q12hrs     Pt seen in room. Resting in bed on 6L NC satting 95-98%. EN discontinued, transitioned to minced and moist diet with thin liquids per SLP recommendations. Intake remains variable, fair. RD reviewed adequate intake parameters, encouraged nutrient dense foods. Recommendations below.    Previous Nutrition Diagnosis:  Inadequate Energy Intake r/t inability to meet EER on current diet AEB intake <75% EER    Goal: Pt will consistently meet % of daily EER via tolerated route     Recommendations:  1. Continue minced and moist diet, recommend add ensure enlive once/day  2. Recommend banatrol TID with meal liquids for diarrhea  3. Recommend MVI w/minerals   4. Monitor lytes and replete prn. POC BG q6hrs   5. Pain regimen per team     RD to remain available for further recommendations as needed    Education: N/A    Risk Level: High [  X ] Moderate [   ] Low [   ].

## 2023-03-30 NOTE — PROGRESS NOTE ADULT - ASSESSMENT
53Y M with non-oliguric iATN i/s/o replacement of aortic arch and TAVR on 3/20- prolonged OR time and massive transfusion and other blood products. Initiated on CVVHD (3/21-3/24) for clearance and volume. Started on diuresis on 3/24 with good response, required iHD 3/27 mostly for volume, but during session drop in BP and increased HR, along with decreased UOP, likely due to removing fluid faster than plasma refill. Now with stable lytes, adequate clearance and stable renal function, responding well with Bumex 2mg Q12hrs     Assessment/Plan:     #Non-oliguric iATN due to intra-op hemodynamic changes   BCr 1.2, eGFR 67 (3/9)  UA (3/25) w/ proteinuria and mod blood w/o RBCs. TG2000-->886  CVVHD (3/21- 3/24)  iHD 3/27; 3.3 L (plan was for 3L)  Pt still diuresing well w/ Bumex 2mg Q12 with adequate urine output, though drops in UO when Bumex wears off, which may be an indication of low effective intravascular/renal blood flow  Access; Rt IJ S/Q HD cath placed 3/27    Plan:  Given under-achieving our net negative goal (1-2L/d) and pt still w/ O2 requirement and CXR showing effusions, will arrange iHD today and target at least 2 L UF. If BP and HR remains stable (<120), may increase UF goal to 3L  Suggest giving 3 doses of albumin to help mobilize fluid from interstitium/increase intravascular vol and help w/ UF via HD  C/w Bumex 2mg Q12hrs  If patient still not achieving fluid target or not tolerating HD, can start AQ in the afternoon/evening  Strict I&Os  BMP per icu protocol  Maintain MAP >70 for renal perfusion       Discussed w/ ICU team

## 2023-03-30 NOTE — PROCEDURE NOTE - NSCVLACTUALSITE_VASC_A_CORE
left/internal jugular
left/internal jugular
right/internal jugular
left/femoral vein

## 2023-03-30 NOTE — PROGRESS NOTE ADULT - SUBJECTIVE AND OBJECTIVE BOX
Name of procedure – Flexible fiberoptic bronchoscopy        Flexible fiberoptic bronchoscopy was performed under versed and fentanyl for conscious sedation     Pre-procedural assessment reveals no risk of Tb    The scope was advanced orotracheally   VC were sharp and moving well…    The leeanne was sharp  Right LL and RML air way were patent , scant secretions  LLL and MARCO air way were patent , scant secretions    CXR confirmed no pneumothorax post bronch  There were no complications  The patient tolerated the procedure well

## 2023-03-30 NOTE — PROGRESS NOTE ADULT - SUBJECTIVE AND OBJECTIVE BOX
Patient is a 53y Male seen and evaluated at bedside. Overnight events noted. Pt in chair in discomfort due to surgical site pain, more in the back. Denies SOB, nausea, vomiting. Overnight events noted. Pt w/ 2.3L UO/24 hours w/ bumex 2mg Q12 and net negative 800ml/24 hours      Meds:    aspirin  chewable 81 daily  buMETAnide Injectable 2 every 12 hours  chlorhexidine 2% Cloths 1 daily  dextrose 5%. 1000 <Continuous>  dextrose 5%. 1000 <Continuous>  dextrose 50% Injectable 50 every 15 minutes  dextrose 50% Injectable 25 every 15 minutes  dextrose Oral Gel 15 once PRN  fentaNYL    Injectable 25 every 3 hours PRN  glucagon  Injectable 1 once  heparin   Injectable 5000 every 8 hours  insulin lispro (ADMELOG) corrective regimen sliding scale  every 6 hours  lacosamide IVPB 100 every 12 hours  metoprolol tartrate Injectable 5 every 6 hours  pantoprazole  Injectable 40 daily  piperacillin/tazobactam IVPB.. 3.375 every 8 hours  sodium chloride 0.9%. 1000 <Continuous>  valproate sodium  IVPB 750 every 12 hours      T(C): , Max: 37.4 (03-29-23 @ 17:08)  T(F): , Max: 99.4 (03-29-23 @ 17:08)  HR: 116 (03-30-23 @ 10:00)  BP: 147/111 (03-30-23 @ 10:00)  BP(mean): 124 (03-30-23 @ 10:00)  RR: 22 (03-30-23 @ 10:00)  SpO2: 98% (03-30-23 @ 10:00)  Wt(kg): --    03-29 @ 07:01  -  03-30 @ 07:00  --------------------------------------------------------  IN: 2321.2 mL / OUT: 3165 mL / NET: -843.8 mL    03-30 @ 07:01  -  03-30 @ 11:39  --------------------------------------------------------  IN: 125 mL / OUT: 185 mL / NET: -60 mL          Review of Systems:  ROS negative except as per HPI      PHYSICAL EXAM:  GENERAL: Mild distress, laying in chair  CHEST/LUNG: poor inspiratory effort due to pain, no wheezing, on NC O2  HEART: normal S1S2, RRR  ABDOMEN: Soft, Nontender  : Cantrell in place with clear urine   Other: Rectal bag in place with liquid stool   EXTREMITIES: No edema   NEUROLOGY: AAO x3, no focal neurological deficit  ACCESS: Rt IJ S/Q HD Cath      LABS:                        9.1    25.82 )-----------( 131      ( 30 Mar 2023 00:57 )             27.2     03-30    142  |  107  |  65<H>  ----------------------------<  112<H>  4.0   |  24  |  3.14<H>    Ca    8.7      30 Mar 2023 00:57  Phos  4.0     03-30  Mg     2.3     03-30    TPro  5.3<L>  /  Alb  2.5<L>  /  TBili  1.1  /  DBili  x   /  AST  26  /  ALT  9<L>  /  AlkPhos  71  03-30      PT/INR - ( 30 Mar 2023 00:57 )   PT: 16.4 sec;   INR: 1.37          PTT - ( 30 Mar 2023 00:57 )  PTT:28.8 sec          RADIOLOGY & ADDITIONAL STUDIES:

## 2023-03-31 LAB
ALBUMIN SERPL ELPH-MCNC: 2.4 G/DL — LOW (ref 3.3–5)
ALBUMIN SERPL ELPH-MCNC: 2.6 G/DL — LOW (ref 3.3–5)
ALBUMIN SERPL ELPH-MCNC: 2.8 G/DL — LOW (ref 3.3–5)
ALP SERPL-CCNC: 105 U/L — SIGNIFICANT CHANGE UP (ref 40–120)
ALP SERPL-CCNC: 75 U/L — SIGNIFICANT CHANGE UP (ref 40–120)
ALP SERPL-CCNC: 95 U/L — SIGNIFICANT CHANGE UP (ref 40–120)
ALT FLD-CCNC: 10 U/L — SIGNIFICANT CHANGE UP (ref 10–45)
ALT FLD-CCNC: 10 U/L — SIGNIFICANT CHANGE UP (ref 10–45)
ALT FLD-CCNC: 11 U/L — SIGNIFICANT CHANGE UP (ref 10–45)
ANION GAP SERPL CALC-SCNC: 14 MMOL/L — SIGNIFICANT CHANGE UP (ref 5–17)
ANION GAP SERPL CALC-SCNC: 8 MMOL/L — SIGNIFICANT CHANGE UP (ref 5–17)
ANION GAP SERPL CALC-SCNC: 9 MMOL/L — SIGNIFICANT CHANGE UP (ref 5–17)
APTT BLD: 28.9 SEC — SIGNIFICANT CHANGE UP (ref 27.5–35.5)
APTT BLD: 29.1 SEC — SIGNIFICANT CHANGE UP (ref 27.5–35.5)
APTT BLD: 29.7 SEC — SIGNIFICANT CHANGE UP (ref 27.5–35.5)
AST SERPL-CCNC: 24 U/L — SIGNIFICANT CHANGE UP (ref 10–40)
AST SERPL-CCNC: 24 U/L — SIGNIFICANT CHANGE UP (ref 10–40)
AST SERPL-CCNC: 27 U/L — SIGNIFICANT CHANGE UP (ref 10–40)
BASE EXCESS BLDA CALC-SCNC: -0.6 MMOL/L — SIGNIFICANT CHANGE UP (ref -2–3)
BASE EXCESS BLDA CALC-SCNC: 0.7 MMOL/L — SIGNIFICANT CHANGE UP (ref -2–3)
BASE EXCESS BLDV CALC-SCNC: 0.5 MMOL/L — SIGNIFICANT CHANGE UP (ref -2–3)
BILIRUB SERPL-MCNC: 1 MG/DL — SIGNIFICANT CHANGE UP (ref 0.2–1.2)
BILIRUB SERPL-MCNC: 1.1 MG/DL — SIGNIFICANT CHANGE UP (ref 0.2–1.2)
BILIRUB SERPL-MCNC: 1.5 MG/DL — HIGH (ref 0.2–1.2)
BUN SERPL-MCNC: 37 MG/DL — HIGH (ref 7–23)
BUN SERPL-MCNC: 43 MG/DL — HIGH (ref 7–23)
BUN SERPL-MCNC: 46 MG/DL — HIGH (ref 7–23)
CALCIUM SERPL-MCNC: 8.4 MG/DL — SIGNIFICANT CHANGE UP (ref 8.4–10.5)
CALCIUM SERPL-MCNC: 8.5 MG/DL — SIGNIFICANT CHANGE UP (ref 8.4–10.5)
CALCIUM SERPL-MCNC: 8.7 MG/DL — SIGNIFICANT CHANGE UP (ref 8.4–10.5)
CHLORIDE SERPL-SCNC: 102 MMOL/L — SIGNIFICANT CHANGE UP (ref 96–108)
CHLORIDE SERPL-SCNC: 103 MMOL/L — SIGNIFICANT CHANGE UP (ref 96–108)
CHLORIDE SERPL-SCNC: 99 MMOL/L — SIGNIFICANT CHANGE UP (ref 96–108)
CO2 BLDA-SCNC: 25 MMOL/L — HIGH (ref 19–24)
CO2 BLDA-SCNC: 26 MMOL/L — HIGH (ref 19–24)
CO2 BLDV-SCNC: 28.5 MMOL/L — HIGH (ref 22–26)
CO2 SERPL-SCNC: 24 MMOL/L — SIGNIFICANT CHANGE UP (ref 22–31)
CO2 SERPL-SCNC: 25 MMOL/L — SIGNIFICANT CHANGE UP (ref 22–31)
CO2 SERPL-SCNC: 27 MMOL/L — SIGNIFICANT CHANGE UP (ref 22–31)
CREAT SERPL-MCNC: 2.33 MG/DL — HIGH (ref 0.5–1.3)
CREAT SERPL-MCNC: 2.92 MG/DL — HIGH (ref 0.5–1.3)
CREAT SERPL-MCNC: 3.44 MG/DL — HIGH (ref 0.5–1.3)
CULTURE RESULTS: SIGNIFICANT CHANGE UP
CULTURE RESULTS: SIGNIFICANT CHANGE UP
EGFR: 20 ML/MIN/1.73M2 — LOW
EGFR: 25 ML/MIN/1.73M2 — LOW
EGFR: 32 ML/MIN/1.73M2 — LOW
GAS PNL BLDA: SIGNIFICANT CHANGE UP
GAS PNL BLDV: SIGNIFICANT CHANGE UP
GLUCOSE BLDC GLUCOMTR-MCNC: 100 MG/DL — HIGH (ref 70–99)
GLUCOSE BLDC GLUCOMTR-MCNC: 110 MG/DL — HIGH (ref 70–99)
GLUCOSE BLDC GLUCOMTR-MCNC: 92 MG/DL — SIGNIFICANT CHANGE UP (ref 70–99)
GLUCOSE BLDC GLUCOMTR-MCNC: 99 MG/DL — SIGNIFICANT CHANGE UP (ref 70–99)
GLUCOSE SERPL-MCNC: 100 MG/DL — HIGH (ref 70–99)
GLUCOSE SERPL-MCNC: 103 MG/DL — HIGH (ref 70–99)
GLUCOSE SERPL-MCNC: 108 MG/DL — HIGH (ref 70–99)
HCO3 BLDA-SCNC: 24 MMOL/L — SIGNIFICANT CHANGE UP (ref 21–28)
HCO3 BLDA-SCNC: 25 MMOL/L — SIGNIFICANT CHANGE UP (ref 21–28)
HCO3 BLDV-SCNC: 27 MMOL/L — SIGNIFICANT CHANGE UP (ref 22–29)
HCT VFR BLD CALC: 29.9 % — LOW (ref 39–50)
HCT VFR BLD CALC: 30.3 % — LOW (ref 39–50)
HCT VFR BLD CALC: 31.8 % — LOW (ref 39–50)
HCV AB S/CO SERPL IA: 0.29 S/CO — SIGNIFICANT CHANGE UP (ref 0–0.99)
HCV AB SERPL-IMP: SIGNIFICANT CHANGE UP
HGB BLD-MCNC: 10.1 G/DL — LOW (ref 13–17)
HGB BLD-MCNC: 10.3 G/DL — LOW (ref 13–17)
HGB BLD-MCNC: 10.8 G/DL — LOW (ref 13–17)
INR BLD: 1.21 — HIGH (ref 0.88–1.16)
INR BLD: 1.27 — HIGH (ref 0.88–1.16)
INR BLD: 1.34 — HIGH (ref 0.88–1.16)
LACTATE SERPL-SCNC: 0.7 MMOL/L — SIGNIFICANT CHANGE UP (ref 0.5–2)
LACTATE SERPL-SCNC: 0.8 MMOL/L — SIGNIFICANT CHANGE UP (ref 0.5–2)
MAGNESIUM SERPL-MCNC: 1.9 MG/DL — SIGNIFICANT CHANGE UP (ref 1.6–2.6)
MAGNESIUM SERPL-MCNC: 2.5 MG/DL — SIGNIFICANT CHANGE UP (ref 1.6–2.6)
MAGNESIUM SERPL-MCNC: 2.5 MG/DL — SIGNIFICANT CHANGE UP (ref 1.6–2.6)
MCHC RBC-ENTMCNC: 30.2 PG — SIGNIFICANT CHANGE UP (ref 27–34)
MCHC RBC-ENTMCNC: 30.4 PG — SIGNIFICANT CHANGE UP (ref 27–34)
MCHC RBC-ENTMCNC: 31 PG — SIGNIFICANT CHANGE UP (ref 27–34)
MCHC RBC-ENTMCNC: 33.8 GM/DL — SIGNIFICANT CHANGE UP (ref 32–36)
MCHC RBC-ENTMCNC: 34 GM/DL — SIGNIFICANT CHANGE UP (ref 32–36)
MCHC RBC-ENTMCNC: 34 GM/DL — SIGNIFICANT CHANGE UP (ref 32–36)
MCV RBC AUTO: 88.8 FL — SIGNIFICANT CHANGE UP (ref 80–100)
MCV RBC AUTO: 90.1 FL — SIGNIFICANT CHANGE UP (ref 80–100)
MCV RBC AUTO: 91.3 FL — SIGNIFICANT CHANGE UP (ref 80–100)
NRBC # BLD: 0 /100 WBCS — SIGNIFICANT CHANGE UP (ref 0–0)
PCO2 BLDA: 35 MMHG — SIGNIFICANT CHANGE UP (ref 35–48)
PCO2 BLDA: 43 MMHG — SIGNIFICANT CHANGE UP (ref 35–48)
PCO2 BLDV: 50 MMHG — SIGNIFICANT CHANGE UP (ref 42–55)
PH BLDA: 7.37 — SIGNIFICANT CHANGE UP (ref 7.35–7.45)
PH BLDA: 7.45 — SIGNIFICANT CHANGE UP (ref 7.35–7.45)
PH BLDV: 7.34 — SIGNIFICANT CHANGE UP (ref 7.32–7.43)
PHOSPHATE SERPL-MCNC: 3.4 MG/DL — SIGNIFICANT CHANGE UP (ref 2.5–4.5)
PHOSPHATE SERPL-MCNC: 4.1 MG/DL — SIGNIFICANT CHANGE UP (ref 2.5–4.5)
PLATELET # BLD AUTO: 155 K/UL — SIGNIFICANT CHANGE UP (ref 150–400)
PLATELET # BLD AUTO: 187 K/UL — SIGNIFICANT CHANGE UP (ref 150–400)
PLATELET # BLD AUTO: 196 K/UL — SIGNIFICANT CHANGE UP (ref 150–400)
PO2 BLDA: 74 MMHG — LOW (ref 83–108)
PO2 BLDA: 94 MMHG — SIGNIFICANT CHANGE UP (ref 83–108)
PO2 BLDV: 51 MMHG — HIGH (ref 25–45)
POTASSIUM SERPL-MCNC: 3.7 MMOL/L — SIGNIFICANT CHANGE UP (ref 3.5–5.3)
POTASSIUM SERPL-MCNC: 3.8 MMOL/L — SIGNIFICANT CHANGE UP (ref 3.5–5.3)
POTASSIUM SERPL-MCNC: 3.9 MMOL/L — SIGNIFICANT CHANGE UP (ref 3.5–5.3)
POTASSIUM SERPL-SCNC: 3.7 MMOL/L — SIGNIFICANT CHANGE UP (ref 3.5–5.3)
POTASSIUM SERPL-SCNC: 3.8 MMOL/L — SIGNIFICANT CHANGE UP (ref 3.5–5.3)
POTASSIUM SERPL-SCNC: 3.9 MMOL/L — SIGNIFICANT CHANGE UP (ref 3.5–5.3)
PROT SERPL-MCNC: 5.3 G/DL — LOW (ref 6–8.3)
PROT SERPL-MCNC: 5.4 G/DL — LOW (ref 6–8.3)
PROT SERPL-MCNC: 5.4 G/DL — LOW (ref 6–8.3)
PROTHROM AB SERPL-ACNC: 14.4 SEC — HIGH (ref 10.5–13.4)
PROTHROM AB SERPL-ACNC: 15.2 SEC — HIGH (ref 10.5–13.4)
PROTHROM AB SERPL-ACNC: 16 SEC — HIGH (ref 10.5–13.4)
RBC # BLD: 3.32 M/UL — LOW (ref 4.2–5.8)
RBC # BLD: 3.32 M/UL — LOW (ref 4.2–5.8)
RBC # BLD: 3.58 M/UL — LOW (ref 4.2–5.8)
RBC # FLD: 14.6 % — HIGH (ref 10.3–14.5)
RBC # FLD: 15.4 % — HIGH (ref 10.3–14.5)
RBC # FLD: 15.7 % — HIGH (ref 10.3–14.5)
SAO2 % BLDA: 97.3 % — SIGNIFICANT CHANGE UP (ref 94–98)
SAO2 % BLDA: 98.5 % — HIGH (ref 94–98)
SAO2 % BLDV: 77.3 % — SIGNIFICANT CHANGE UP (ref 67–88)
SODIUM SERPL-SCNC: 134 MMOL/L — LOW (ref 135–145)
SODIUM SERPL-SCNC: 136 MMOL/L — SIGNIFICANT CHANGE UP (ref 135–145)
SODIUM SERPL-SCNC: 141 MMOL/L — SIGNIFICANT CHANGE UP (ref 135–145)
SPECIMEN SOURCE: SIGNIFICANT CHANGE UP
WBC # BLD: 21.16 K/UL — HIGH (ref 3.8–10.5)
WBC # BLD: 23.7 K/UL — HIGH (ref 3.8–10.5)
WBC # BLD: 24.43 K/UL — HIGH (ref 3.8–10.5)
WBC # FLD AUTO: 21.16 K/UL — HIGH (ref 3.8–10.5)
WBC # FLD AUTO: 23.7 K/UL — HIGH (ref 3.8–10.5)
WBC # FLD AUTO: 24.43 K/UL — HIGH (ref 3.8–10.5)

## 2023-03-31 RX ORDER — MULTIVIT-MIN/FERROUS GLUCONATE 9 MG/15 ML
15 LIQUID (ML) ORAL DAILY
Refills: 0 | Status: DISCONTINUED | OUTPATIENT
Start: 2023-03-31 | End: 2023-04-05

## 2023-03-31 RX ORDER — HALOPERIDOL DECANOATE 100 MG/ML
5 INJECTION INTRAMUSCULAR ONCE
Refills: 0 | Status: DISCONTINUED | OUTPATIENT
Start: 2023-03-31 | End: 2023-03-31

## 2023-03-31 RX ORDER — MAGNESIUM SULFATE 500 MG/ML
2 VIAL (ML) INJECTION ONCE
Refills: 0 | Status: COMPLETED | OUTPATIENT
Start: 2023-03-31 | End: 2023-03-31

## 2023-03-31 RX ORDER — HALOPERIDOL DECANOATE 100 MG/ML
5 INJECTION INTRAMUSCULAR ONCE
Refills: 0 | Status: COMPLETED | OUTPATIENT
Start: 2023-03-31 | End: 2023-03-31

## 2023-03-31 RX ORDER — CALCIUM GLUCONATE 100 MG/ML
2 VIAL (ML) INTRAVENOUS ONCE
Refills: 0 | Status: COMPLETED | OUTPATIENT
Start: 2023-03-31 | End: 2023-03-31

## 2023-03-31 RX ORDER — NOREPINEPHRINE BITARTRATE/D5W 8 MG/250ML
0.05 PLASTIC BAG, INJECTION (ML) INTRAVENOUS
Qty: 8 | Refills: 0 | Status: DISCONTINUED | OUTPATIENT
Start: 2023-03-31 | End: 2023-03-31

## 2023-03-31 RX ORDER — MODAFINIL 200 MG/1
200 TABLET ORAL DAILY
Refills: 0 | Status: DISCONTINUED | OUTPATIENT
Start: 2023-04-01 | End: 2023-04-03

## 2023-03-31 RX ORDER — AMIODARONE HYDROCHLORIDE 400 MG/1
150 TABLET ORAL ONCE
Refills: 0 | Status: COMPLETED | OUTPATIENT
Start: 2023-03-31 | End: 2023-03-31

## 2023-03-31 RX ORDER — ALBUMIN HUMAN 25 %
250 VIAL (ML) INTRAVENOUS
Refills: 0 | Status: COMPLETED | OUTPATIENT
Start: 2023-03-31 | End: 2023-03-31

## 2023-03-31 RX ORDER — POTASSIUM CHLORIDE 20 MEQ
20 PACKET (EA) ORAL ONCE
Refills: 0 | Status: COMPLETED | OUTPATIENT
Start: 2023-03-31 | End: 2023-03-31

## 2023-03-31 RX ORDER — SODIUM CHLORIDE 9 MG/ML
1000 INJECTION INTRAMUSCULAR; INTRAVENOUS; SUBCUTANEOUS ONCE
Refills: 0 | Status: COMPLETED | OUTPATIENT
Start: 2023-03-31 | End: 2023-03-31

## 2023-03-31 RX ADMIN — FENTANYL CITRATE 25 MICROGRAM(S): 50 INJECTION INTRAVENOUS at 13:35

## 2023-03-31 RX ADMIN — FENTANYL CITRATE 25 MICROGRAM(S): 50 INJECTION INTRAVENOUS at 13:17

## 2023-03-31 RX ADMIN — DEXMEDETOMIDINE HYDROCHLORIDE IN 0.9% SODIUM CHLORIDE 9.64 MICROGRAM(S)/KG/HR: 4 INJECTION INTRAVENOUS at 02:31

## 2023-03-31 RX ADMIN — HEPARIN SODIUM 5000 UNIT(S): 5000 INJECTION INTRAVENOUS; SUBCUTANEOUS at 13:20

## 2023-03-31 RX ADMIN — Medication 25 GRAM(S): at 03:24

## 2023-03-31 RX ADMIN — Medication 125 MILLILITER(S): at 05:28

## 2023-03-31 RX ADMIN — FENTANYL CITRATE 25 MICROGRAM(S): 50 INJECTION INTRAVENOUS at 09:14

## 2023-03-31 RX ADMIN — PIPERACILLIN AND TAZOBACTAM 25 GRAM(S): 4; .5 INJECTION, POWDER, LYOPHILIZED, FOR SOLUTION INTRAVENOUS at 21:53

## 2023-03-31 RX ADMIN — AMIODARONE HYDROCHLORIDE 600 MILLIGRAM(S): 400 TABLET ORAL at 13:09

## 2023-03-31 RX ADMIN — LACOSAMIDE 120 MILLIGRAM(S): 50 TABLET ORAL at 07:37

## 2023-03-31 RX ADMIN — Medication 57.5 MILLIGRAM(S): at 06:39

## 2023-03-31 RX ADMIN — HALOPERIDOL DECANOATE 5 MILLIGRAM(S): 100 INJECTION INTRAMUSCULAR at 05:30

## 2023-03-31 RX ADMIN — FENTANYL CITRATE 25 MICROGRAM(S): 50 INJECTION INTRAVENOUS at 17:06

## 2023-03-31 RX ADMIN — Medication 4 MILLIGRAM(S): at 17:11

## 2023-03-31 RX ADMIN — LACOSAMIDE 120 MILLIGRAM(S): 50 TABLET ORAL at 19:10

## 2023-03-31 RX ADMIN — Medication 57.5 MILLIGRAM(S): at 18:29

## 2023-03-31 RX ADMIN — PANTOPRAZOLE SODIUM 40 MILLIGRAM(S): 20 TABLET, DELAYED RELEASE ORAL at 11:06

## 2023-03-31 RX ADMIN — BUMETANIDE 2 MILLIGRAM(S): 0.25 INJECTION INTRAMUSCULAR; INTRAVENOUS at 02:26

## 2023-03-31 RX ADMIN — SODIUM CHLORIDE 1000 MILLILITER(S): 9 INJECTION INTRAMUSCULAR; INTRAVENOUS; SUBCUTANEOUS at 02:01

## 2023-03-31 RX ADMIN — BUMETANIDE 2 MILLIGRAM(S): 0.25 INJECTION INTRAMUSCULAR; INTRAVENOUS at 14:10

## 2023-03-31 RX ADMIN — Medication 100 MILLIEQUIVALENT(S): at 03:24

## 2023-03-31 RX ADMIN — Medication 5 MILLIGRAM(S): at 00:47

## 2023-03-31 RX ADMIN — HEPARIN SODIUM 5000 UNIT(S): 5000 INJECTION INTRAVENOUS; SUBCUTANEOUS at 21:54

## 2023-03-31 RX ADMIN — Medication 7.23 MICROGRAM(S)/KG/MIN: at 01:55

## 2023-03-31 RX ADMIN — FENTANYL CITRATE 25 MICROGRAM(S): 50 INJECTION INTRAVENOUS at 21:54

## 2023-03-31 RX ADMIN — PIPERACILLIN AND TAZOBACTAM 25 GRAM(S): 4; .5 INJECTION, POWDER, LYOPHILIZED, FOR SOLUTION INTRAVENOUS at 13:20

## 2023-03-31 RX ADMIN — Medication 81 MILLIGRAM(S): at 11:07

## 2023-03-31 RX ADMIN — Medication 5 MILLIGRAM(S): at 11:15

## 2023-03-31 RX ADMIN — Medication 200 GRAM(S): at 03:24

## 2023-03-31 RX ADMIN — FENTANYL CITRATE 25 MICROGRAM(S): 50 INJECTION INTRAVENOUS at 17:15

## 2023-03-31 RX ADMIN — Medication 5 MILLIGRAM(S): at 17:07

## 2023-03-31 RX ADMIN — CHLORHEXIDINE GLUCONATE 1 APPLICATION(S): 213 SOLUTION TOPICAL at 06:44

## 2023-03-31 RX ADMIN — FENTANYL CITRATE 25 MICROGRAM(S): 50 INJECTION INTRAVENOUS at 09:15

## 2023-03-31 RX ADMIN — PIPERACILLIN AND TAZOBACTAM 25 GRAM(S): 4; .5 INJECTION, POWDER, LYOPHILIZED, FOR SOLUTION INTRAVENOUS at 06:29

## 2023-03-31 RX ADMIN — Medication 125 MILLILITER(S): at 01:55

## 2023-03-31 RX ADMIN — Medication 4 MILLIGRAM(S): at 11:06

## 2023-03-31 RX ADMIN — HEPARIN SODIUM 5000 UNIT(S): 5000 INJECTION INTRAVENOUS; SUBCUTANEOUS at 06:28

## 2023-03-31 NOTE — PROGRESS NOTE ADULT - ASSESSMENT
54 yo M with non-oliguric iATN i/s/o replacement of aortic arch and TAVR on 3/20- prolonged OR time and massive transfusion and other blood products. Initiated on CVVHD (3/21-3/24) for clearance and volume. 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 [id number]  Showing some ATN recovery, however UO not adequate for his present needs  CVVHD (3/21- 3/24)  Last iHD 3/30; 2.8 L UF achieved (plan was for 2L, but BP was stable so increased UF goal, though pt had a high HR during treatment in the range of 110-120s)  post-iHD yesterday 3/30, pt had lower BP readings and this translated to a lower urine output overnight and decreased response to bumex as compared to before. BP now improved this AM w/o pressors  Access; Rt IJ S/Q HD cath placed 3/27      Plan:  Decrease in UO noted last 16-24 hours which corresponded to drops in BP yesterday. This was likely due to removing fluid faster than plasma refill.  BP now improved this AM. Anticipate improvement in UO w/ the improved BP  C/w Bumex 2mg Q12hrs. If under-achieving our net negative goal (1-2L/d), then prefer aquapharesis for slower, persistent UF rate (50-75 ml/hr) to avoid drops in BP and renal perfusion w/ iHD especially since he is recovering from ATN  Strict I&Os  BMP per icu protocol  Maintain MAP >70 for renal perfusion       Discussed w/ CTICU team

## 2023-03-31 NOTE — PROGRESS NOTE ADULT - SUBJECTIVE AND OBJECTIVE BOX
Patient is a 53y Male seen and evaluated at bedside. Laying in bed on BiPAP. Awake, interacting, says pain at the surgical site better compared to yesterday. Denies any other symptoms      Meds:    aspirin  chewable 81 daily  buMETAnide Injectable 2 every 12 hours  chlorhexidine 2% Cloths 1 daily  dextrose 5%. 1000 <Continuous>  dextrose 5%. 1000 <Continuous>  dextrose 50% Injectable 50 every 15 minutes  dextrose 50% Injectable 25 every 15 minutes  dextrose Oral Gel 15 once PRN  fentaNYL    Injectable 25 every 3 hours PRN  glucagon  Injectable 1 once  heparin   Injectable 5000 every 8 hours  insulin lispro (ADMELOG) corrective regimen sliding scale  every 6 hours  lacosamide IVPB 100 every 12 hours  metoprolol tartrate Injectable 5 every 6 hours  norepinephrine Infusion 0.05 <Continuous>  pantoprazole  Injectable 40 daily  piperacillin/tazobactam IVPB.. 3.375 every 8 hours  sodium chloride 0.9%. 1000 <Continuous>  testosterone patch 4 mG/24 Hr(s) 4 daily  valproate sodium  IVPB 750 every 12 hours      T(C): , Max: 37.3 (03-30-23 @ 17:07)  T(F): , Max: 99.1 (03-30-23 @ 17:07)  HR: 103 (03-31-23 @ 10:00)  BP: 111/61 (03-31-23 @ 06:00)  BP(mean): 79 (03-31-23 @ 06:00)  RR: 16 (03-31-23 @ 10:00)  SpO2: 100% (03-31-23 @ 10:00)  Wt(kg): --    03-30 @ 07:01  -  03-31 @ 07:00  --------------------------------------------------------  IN: 2974.3 mL / OUT: 4805 mL / NET: -1830.7 mL    03-31 @ 07:01  -  03-31 @ 10:29  --------------------------------------------------------  IN: 141 mL / OUT: 190 mL / NET: -49 mL          Review of Systems:  ROS negative except as per HPI      PHYSICAL EXAM:  GENERAL: NAD, laying in bed  CHEST/LUNG: Bibasilar crackles, on BIPAP  HEART: normal S1S2, RRR  ABDOMEN: Soft, Nontender  : Cantrell in place with clear urine   Other: Rectal bag in place with liquid stool   EXTREMITIES: No edema   NEUROLOGY: AAO x3, no focal neurological deficit  ACCESS: Rt IJ S/Q HD Cath    LABS:                        10.8   23.70 )-----------( 155      ( 31 Mar 2023 00:19 )             31.8     03-31    134<L>  |  99  |  37<H>  ----------------------------<  103<H>  3.9   |  27  |  2.33<H>    Ca    8.4      31 Mar 2023 00:19  Phos  3.4     03-31  Mg     1.9     03-31    TPro  5.3<L>  /  Alb  2.4<L>  /  TBili  1.5<H>  /  DBili  x   /  AST  24  /  ALT  10  /  AlkPhos  75  03-31    Hepatitis B Surface Antibody: Nonreact (03-30 @ 12:02)    PT/INR - ( 31 Mar 2023 00:19 )   PT: 16.0 sec;   INR: 1.34          PTT - ( 31 Mar 2023 00:19 )  PTT:28.9 sec          RADIOLOGY & ADDITIONAL STUDIES:

## 2023-04-01 LAB
ALBUMIN SERPL ELPH-MCNC: 2.5 G/DL — LOW (ref 3.3–5)
ALBUMIN SERPL ELPH-MCNC: 2.7 G/DL — LOW (ref 3.3–5)
ALP SERPL-CCNC: 105 U/L — SIGNIFICANT CHANGE UP (ref 40–120)
ALP SERPL-CCNC: 108 U/L — SIGNIFICANT CHANGE UP (ref 40–120)
ALT FLD-CCNC: 11 U/L — SIGNIFICANT CHANGE UP (ref 10–45)
ALT FLD-CCNC: 11 U/L — SIGNIFICANT CHANGE UP (ref 10–45)
ANION GAP SERPL CALC-SCNC: 10 MMOL/L — SIGNIFICANT CHANGE UP (ref 5–17)
ANION GAP SERPL CALC-SCNC: 12 MMOL/L — SIGNIFICANT CHANGE UP (ref 5–17)
APTT BLD: 28.8 SEC — SIGNIFICANT CHANGE UP (ref 27.5–35.5)
APTT BLD: 31.7 SEC — SIGNIFICANT CHANGE UP (ref 27.5–35.5)
AST SERPL-CCNC: 25 U/L — SIGNIFICANT CHANGE UP (ref 10–40)
AST SERPL-CCNC: 25 U/L — SIGNIFICANT CHANGE UP (ref 10–40)
BASE EXCESS BLDA CALC-SCNC: 0 MMOL/L — SIGNIFICANT CHANGE UP (ref -2–3)
BASE EXCESS BLDA CALC-SCNC: 0.7 MMOL/L — SIGNIFICANT CHANGE UP (ref -2–3)
BASE EXCESS BLDV CALC-SCNC: 0.3 MMOL/L — SIGNIFICANT CHANGE UP (ref -2–3)
BILIRUB SERPL-MCNC: 0.8 MG/DL — SIGNIFICANT CHANGE UP (ref 0.2–1.2)
BILIRUB SERPL-MCNC: 0.9 MG/DL — SIGNIFICANT CHANGE UP (ref 0.2–1.2)
BUN SERPL-MCNC: 56 MG/DL — HIGH (ref 7–23)
BUN SERPL-MCNC: 60 MG/DL — HIGH (ref 7–23)
CALCIUM SERPL-MCNC: 8.3 MG/DL — LOW (ref 8.4–10.5)
CALCIUM SERPL-MCNC: 8.6 MG/DL — SIGNIFICANT CHANGE UP (ref 8.4–10.5)
CHLORIDE SERPL-SCNC: 104 MMOL/L — SIGNIFICANT CHANGE UP (ref 96–108)
CHLORIDE SERPL-SCNC: 106 MMOL/L — SIGNIFICANT CHANGE UP (ref 96–108)
CO2 BLDA-SCNC: 27 MMOL/L — HIGH (ref 19–24)
CO2 BLDA-SCNC: 27 MMOL/L — HIGH (ref 19–24)
CO2 BLDV-SCNC: 28 MMOL/L — HIGH (ref 22–26)
CO2 SERPL-SCNC: 24 MMOL/L — SIGNIFICANT CHANGE UP (ref 22–31)
CO2 SERPL-SCNC: 26 MMOL/L — SIGNIFICANT CHANGE UP (ref 22–31)
CREAT SERPL-MCNC: 3.53 MG/DL — HIGH (ref 0.5–1.3)
CREAT SERPL-MCNC: 3.78 MG/DL — HIGH (ref 0.5–1.3)
EGFR: 18 ML/MIN/1.73M2 — LOW
EGFR: 20 ML/MIN/1.73M2 — LOW
GAS PNL BLDA: SIGNIFICANT CHANGE UP
GAS PNL BLDV: SIGNIFICANT CHANGE UP
GLUCOSE BLDC GLUCOMTR-MCNC: 104 MG/DL — HIGH (ref 70–99)
GLUCOSE BLDC GLUCOMTR-MCNC: 82 MG/DL — SIGNIFICANT CHANGE UP (ref 70–99)
GLUCOSE BLDC GLUCOMTR-MCNC: 88 MG/DL — SIGNIFICANT CHANGE UP (ref 70–99)
GLUCOSE BLDC GLUCOMTR-MCNC: 89 MG/DL — SIGNIFICANT CHANGE UP (ref 70–99)
GLUCOSE BLDC GLUCOMTR-MCNC: 99 MG/DL — SIGNIFICANT CHANGE UP (ref 70–99)
GLUCOSE SERPL-MCNC: 111 MG/DL — HIGH (ref 70–99)
GLUCOSE SERPL-MCNC: 116 MG/DL — HIGH (ref 70–99)
HCO3 BLDA-SCNC: 25 MMOL/L — SIGNIFICANT CHANGE UP (ref 21–28)
HCO3 BLDA-SCNC: 25 MMOL/L — SIGNIFICANT CHANGE UP (ref 21–28)
HCO3 BLDV-SCNC: 26 MMOL/L — SIGNIFICANT CHANGE UP (ref 22–29)
HCT VFR BLD CALC: 30 % — LOW (ref 39–50)
HCT VFR BLD CALC: 30.7 % — LOW (ref 39–50)
HGB BLD-MCNC: 10.1 G/DL — LOW (ref 13–17)
HGB BLD-MCNC: 10.6 G/DL — LOW (ref 13–17)
INR BLD: 1.21 — HIGH (ref 0.88–1.16)
INR BLD: 1.21 — HIGH (ref 0.88–1.16)
LACTATE SERPL-SCNC: 0.6 MMOL/L — SIGNIFICANT CHANGE UP (ref 0.5–2)
LACTATE SERPL-SCNC: 0.7 MMOL/L — SIGNIFICANT CHANGE UP (ref 0.5–2)
MAGNESIUM SERPL-MCNC: 2.4 MG/DL — SIGNIFICANT CHANGE UP (ref 1.6–2.6)
MAGNESIUM SERPL-MCNC: 2.4 MG/DL — SIGNIFICANT CHANGE UP (ref 1.6–2.6)
MCHC RBC-ENTMCNC: 30.5 PG — SIGNIFICANT CHANGE UP (ref 27–34)
MCHC RBC-ENTMCNC: 31.2 PG — SIGNIFICANT CHANGE UP (ref 27–34)
MCHC RBC-ENTMCNC: 33.7 GM/DL — SIGNIFICANT CHANGE UP (ref 32–36)
MCHC RBC-ENTMCNC: 34.5 GM/DL — SIGNIFICANT CHANGE UP (ref 32–36)
MCV RBC AUTO: 90.3 FL — SIGNIFICANT CHANGE UP (ref 80–100)
MCV RBC AUTO: 90.6 FL — SIGNIFICANT CHANGE UP (ref 80–100)
NRBC # BLD: 0 /100 WBCS — SIGNIFICANT CHANGE UP (ref 0–0)
NRBC # BLD: 0 /100 WBCS — SIGNIFICANT CHANGE UP (ref 0–0)
PCO2 BLDA: 40 MMHG — SIGNIFICANT CHANGE UP (ref 35–48)
PCO2 BLDA: 43 MMHG — SIGNIFICANT CHANGE UP (ref 35–48)
PCO2 BLDV: 48 MMHG — SIGNIFICANT CHANGE UP (ref 42–55)
PH BLDA: 7.38 — SIGNIFICANT CHANGE UP (ref 7.35–7.45)
PH BLDA: 7.41 — SIGNIFICANT CHANGE UP (ref 7.35–7.45)
PH BLDV: 7.35 — SIGNIFICANT CHANGE UP (ref 7.32–7.43)
PHOSPHATE SERPL-MCNC: 3.9 MG/DL — SIGNIFICANT CHANGE UP (ref 2.5–4.5)
PHOSPHATE SERPL-MCNC: 4.2 MG/DL — SIGNIFICANT CHANGE UP (ref 2.5–4.5)
PLATELET # BLD AUTO: 182 K/UL — SIGNIFICANT CHANGE UP (ref 150–400)
PLATELET # BLD AUTO: 219 K/UL — SIGNIFICANT CHANGE UP (ref 150–400)
PO2 BLDA: 91 MMHG — SIGNIFICANT CHANGE UP (ref 83–108)
PO2 BLDA: 94 MMHG — SIGNIFICANT CHANGE UP (ref 83–108)
PO2 BLDV: 48 MMHG — HIGH (ref 25–45)
POTASSIUM SERPL-MCNC: 3.5 MMOL/L — SIGNIFICANT CHANGE UP (ref 3.5–5.3)
POTASSIUM SERPL-MCNC: 4.2 MMOL/L — SIGNIFICANT CHANGE UP (ref 3.5–5.3)
POTASSIUM SERPL-SCNC: 3.5 MMOL/L — SIGNIFICANT CHANGE UP (ref 3.5–5.3)
POTASSIUM SERPL-SCNC: 4.2 MMOL/L — SIGNIFICANT CHANGE UP (ref 3.5–5.3)
PROCALCITONIN SERPL-MCNC: 17.19 NG/ML — HIGH (ref 0.02–0.1)
PROT SERPL-MCNC: 5.4 G/DL — LOW (ref 6–8.3)
PROT SERPL-MCNC: 5.6 G/DL — LOW (ref 6–8.3)
PROTHROM AB SERPL-ACNC: 14.4 SEC — HIGH (ref 10.5–13.4)
PROTHROM AB SERPL-ACNC: 14.4 SEC — HIGH (ref 10.5–13.4)
RBC # BLD: 3.31 M/UL — LOW (ref 4.2–5.8)
RBC # BLD: 3.4 M/UL — LOW (ref 4.2–5.8)
RBC # FLD: 15.8 % — HIGH (ref 10.3–14.5)
RBC # FLD: 15.8 % — HIGH (ref 10.3–14.5)
SAO2 % BLDA: 98.1 % — HIGH (ref 94–98)
SAO2 % BLDA: 98.8 % — HIGH (ref 94–98)
SAO2 % BLDV: 76.4 % — SIGNIFICANT CHANGE UP (ref 67–88)
SODIUM SERPL-SCNC: 140 MMOL/L — SIGNIFICANT CHANGE UP (ref 135–145)
SODIUM SERPL-SCNC: 142 MMOL/L — SIGNIFICANT CHANGE UP (ref 135–145)
WBC # BLD: 22.84 K/UL — HIGH (ref 3.8–10.5)
WBC # BLD: 24.18 K/UL — HIGH (ref 3.8–10.5)
WBC # FLD AUTO: 22.84 K/UL — HIGH (ref 3.8–10.5)
WBC # FLD AUTO: 24.18 K/UL — HIGH (ref 3.8–10.5)

## 2023-04-01 RX ORDER — POTASSIUM CHLORIDE 20 MEQ
20 PACKET (EA) ORAL
Refills: 0 | Status: COMPLETED | OUTPATIENT
Start: 2023-04-01 | End: 2023-04-01

## 2023-04-01 RX ORDER — LIDOCAINE HCL 20 MG/ML
10 VIAL (ML) INJECTION ONCE
Refills: 0 | Status: DISCONTINUED | OUTPATIENT
Start: 2023-04-01 | End: 2023-04-07

## 2023-04-01 RX ORDER — METOPROLOL TARTRATE 50 MG
25 TABLET ORAL ONCE
Refills: 0 | Status: COMPLETED | OUTPATIENT
Start: 2023-04-01 | End: 2023-04-01

## 2023-04-01 RX ORDER — METOPROLOL TARTRATE 50 MG
25 TABLET ORAL
Refills: 0 | Status: DISCONTINUED | OUTPATIENT
Start: 2023-04-01 | End: 2023-04-05

## 2023-04-01 RX ADMIN — PIPERACILLIN AND TAZOBACTAM 25 GRAM(S): 4; .5 INJECTION, POWDER, LYOPHILIZED, FOR SOLUTION INTRAVENOUS at 21:25

## 2023-04-01 RX ADMIN — FENTANYL CITRATE 25 MICROGRAM(S): 50 INJECTION INTRAVENOUS at 08:45

## 2023-04-01 RX ADMIN — FENTANYL CITRATE 25 MICROGRAM(S): 50 INJECTION INTRAVENOUS at 12:07

## 2023-04-01 RX ADMIN — BUMETANIDE 2 MILLIGRAM(S): 0.25 INJECTION INTRAMUSCULAR; INTRAVENOUS at 01:52

## 2023-04-01 RX ADMIN — Medication 4 MILLIGRAM(S): at 07:15

## 2023-04-01 RX ADMIN — Medication 5 MILLIGRAM(S): at 00:42

## 2023-04-01 RX ADMIN — Medication 57.5 MILLIGRAM(S): at 07:14

## 2023-04-01 RX ADMIN — HEPARIN SODIUM 5000 UNIT(S): 5000 INJECTION INTRAVENOUS; SUBCUTANEOUS at 05:50

## 2023-04-01 RX ADMIN — MODAFINIL 200 MILLIGRAM(S): 200 TABLET ORAL at 11:42

## 2023-04-01 RX ADMIN — Medication 25 MILLIGRAM(S): at 17:19

## 2023-04-01 RX ADMIN — PIPERACILLIN AND TAZOBACTAM 25 GRAM(S): 4; .5 INJECTION, POWDER, LYOPHILIZED, FOR SOLUTION INTRAVENOUS at 13:29

## 2023-04-01 RX ADMIN — Medication 5 MILLIGRAM(S): at 05:50

## 2023-04-01 RX ADMIN — HEPARIN SODIUM 5000 UNIT(S): 5000 INJECTION INTRAVENOUS; SUBCUTANEOUS at 13:29

## 2023-04-01 RX ADMIN — Medication 100 MILLIEQUIVALENT(S): at 05:54

## 2023-04-01 RX ADMIN — PIPERACILLIN AND TAZOBACTAM 25 GRAM(S): 4; .5 INJECTION, POWDER, LYOPHILIZED, FOR SOLUTION INTRAVENOUS at 05:50

## 2023-04-01 RX ADMIN — FENTANYL CITRATE 25 MICROGRAM(S): 50 INJECTION INTRAVENOUS at 09:00

## 2023-04-01 RX ADMIN — HEPARIN SODIUM 5000 UNIT(S): 5000 INJECTION INTRAVENOUS; SUBCUTANEOUS at 21:24

## 2023-04-01 RX ADMIN — Medication 15 MILLILITER(S): at 11:54

## 2023-04-01 RX ADMIN — PANTOPRAZOLE SODIUM 40 MILLIGRAM(S): 20 TABLET, DELAYED RELEASE ORAL at 11:43

## 2023-04-01 RX ADMIN — LACOSAMIDE 120 MILLIGRAM(S): 50 TABLET ORAL at 07:14

## 2023-04-01 RX ADMIN — Medication 81 MILLIGRAM(S): at 11:43

## 2023-04-01 RX ADMIN — Medication 4 MILLIGRAM(S): at 12:09

## 2023-04-01 RX ADMIN — Medication 25 MILLIGRAM(S): at 13:08

## 2023-04-01 RX ADMIN — FENTANYL CITRATE 25 MICROGRAM(S): 50 INJECTION INTRAVENOUS at 11:30

## 2023-04-01 RX ADMIN — LACOSAMIDE 120 MILLIGRAM(S): 50 TABLET ORAL at 19:48

## 2023-04-01 RX ADMIN — Medication 100 MILLIEQUIVALENT(S): at 08:51

## 2023-04-01 RX ADMIN — Medication 4 MILLIGRAM(S): at 19:48

## 2023-04-01 RX ADMIN — Medication 4 MILLIGRAM(S): at 11:43

## 2023-04-01 RX ADMIN — CHLORHEXIDINE GLUCONATE 1 APPLICATION(S): 213 SOLUTION TOPICAL at 05:55

## 2023-04-01 RX ADMIN — Medication 57.5 MILLIGRAM(S): at 18:44

## 2023-04-01 NOTE — PROGRESS NOTE ADULT - SUBJECTIVE AND OBJECTIVE BOX
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 Patient underwent replacement of transverse aortic arch, second stage thoracic endovascular aortic repair, aorto-axillary bypass, AV replacement (bio 23mm), and CABG x 1 (SVG-RCA) EF 75% with Dr. Pierre 3/20. Patient received 7 units pRBC, 6 FFP, 4 plt, 15 cryo, 1000 FEIBA, and uo 1300cc. Started on lasix infusion and phenylephrine, bicarb, Armaan, levo and vaso, with lactic acidosis. 3/21 L femoral HD cath placed and CVVHD started with improvement in acidosis. Pressors off by end of day. Primacor weaned overnight. Patient woke up and follows commands, exhibited facial twitching cw prior seizures per wife and ativan given followed by vEEG placement. Neurology consulted for recs given subtherapeutic AED levels. Given 2 units pRBC. 3/22 Neurology recommended dc vEEG given low suspicion for seizures. Fluid removal initiated with CVVHD. Overnight poor oxygenation requiring bronchoscopy, on laquita and vaso, D10 started for hypoglycemia. 3/23 large residuals, reglan started. CTH performed for poor mentation-no bleed. Amio given for afib. Received 1 unit pRBC and 1 plt, femoral a line removed. CVVHD stopped and diuretic initiated with good response. Primacor turned off. 3/25 new TLC and R cordis with swan placed, tolerated cpap. D10 for hypoglycemia. Underwent aquaphoresis via new LIJ HD cath. Low grade fevers. Overnight swan dc'd. 3/27 New RIJ HDC placed for malfunctioning LIJ, underwent HD with 3.5L removed. Junctional rhythm overnight. 3/29 extubated to HFNC, passed SLP. 3/30 placed on BIPAP for tachypnea, underwent bronchoscopy, new LIJ TLC placed for access, HD performed. TF via dobhoff. L pigtail placed with 700cc out.  Underwent HD followed by back on aquaphoresis. Tolerating PO diet.     FAMILY HISTORY:  No pertinent family history in first degree relatives  PAST MEDICAL & SURGICAL HISTORY:  HTN (hypertension)  Aortic dissection  CAD (coronary artery disease)  Seizure disorder  S/P aortic bifurcation bypass graft  S/P CABG x 1        Patient is a 53y old  Male who presents with a chief complaint of expanding aortic arch and descending aortic aneurysm.    14 system review was unremarkable    Vital signs, hemodynamic and respiratory parameters were reviewed from the bedside nursing flowsheet.  ICU Vital Signs Last 24 Hrs  T(C): 37.1 (2023 21:21), Max: 37.3 (2023 15:25)  T(F): 98.7 (2023 21:21), Max: 99.2 (2023 15:25)  HR: 100 (2023 21:38) (87 - 121)  BP: 149/96 (2023 08:00) (149/96 - 149/96)  BP(mean): 118 (2023 08:00) (118 - 118)  ABP: 144/86 (2023 20:00) (125/88 - 180/88)  ABP(mean): 105 (2023 20:00) (100 - 121)  RR: 12 (2023 21:38) (12 - 20)  SpO2: 100% (2023 21:38) (92% - 100%)    O2 Parameters below as of 2023 21:38  Patient On (Oxygen Delivery Method): nasal cannula w/ humidification  O2 Flow (L/min): 6        Adult Advanced Hemodynamics Last 24 Hrs  CVP(mm Hg): 243 (2023 11:00) (10 - 245)  CVP(cm H2O): --  CO: --  CI: --  PA: --  PA(mean): --  PCWP: --  SVR: --  SVRI: --  PVR: --  PVRI: --, ABG - ( 2023 09:53 )  pH, Arterial: 7.41  pH, Blood: x     /  pCO2: 40    /  pO2: 91    / HCO3: 25    / Base Excess: 0.7   /  SaO2: 98.8                Intake and output was reviewed and the fluid balance was calculated  Daily     Daily Weight in k.8 (2023 18:55)  I&O's Summary    31 Mar 2023 07:01  -  2023 07:00  --------------------------------------------------------  IN: 1704 mL / OUT: 2710 mL / NET: -1006 mL    2023 07:01  -  2023 22:28  --------------------------------------------------------  IN: 611 mL / OUT: 3372 mL / NET: -2761 mL        All lines and drain sites were assessed  Glycemic trend was reviewed    POCT Blood Glucose.: 88 mg/dL (2023 17:04)      Neuro: follows commands  HEENT: nc  Heart: s1 s2   Lungs: clear bl  Abdomen: soft nt nd  Extremities: 2+dp    Lines:  LIJ TLC 3/30  RIJ HD cath 3/27  R radial arterial line 3/20    Tubes:  Med bella x 3  Pleural bella x 2  L pigtail    labs  CBC Full  -  ( 2023 09:55 )  WBC Count : 24.18 K/uL  RBC Count : 3.40 M/uL  Hemoglobin : 10.6 g/dL  Hematocrit : 30.7 %  Platelet Count - Automated : 219 K/uL  Mean Cell Volume : 90.3 fl  Mean Cell Hemoglobin : 31.2 pg  Mean Cell Hemoglobin Concentration : 34.5 gm/dL  Auto Neutrophil # : x  Auto Lymphocyte # : x  Auto Monocyte # : x  Auto Eosinophil # : x  Auto Basophil # : x  Auto Neutrophil % : x  Auto Lymphocyte % : x  Auto Monocyte % : x  Auto Eosinophil % : x  Auto Basophil % : x        142  |  106  |  60<H>  ----------------------------<  111<H>  4.2   |  26  |  3.78<H>    Ca    8.6      2023 09:55  Phos  3.9     04  Mg     2.4         TPro  5.6<L>  /  Alb  2.7<L>  /  TBili  0.9  /  DBili  x   /  AST  25  /  ALT  11  /  AlkPhos  108      PT/INR - ( 2023 09:55 )   PT: 14.4 sec;   INR: 1.21          PTT - ( 2023 09:55 )  PTT:31.7 sec  The current medications were reviewed   MEDICATIONS  (STANDING):  aspirin  chewable 81 milliGRAM(s) Oral daily  chlorhexidine 2% Cloths 1 Application(s) Topical daily  dextrose 5%. 1000 milliLiter(s) (50 mL/Hr) IV Continuous <Continuous>  dextrose 5%. 1000 milliLiter(s) (100 mL/Hr) IV Continuous <Continuous>  dextrose 50% Injectable 50 milliLiter(s) IV Push every 15 minutes  dextrose 50% Injectable 25 milliLiter(s) IV Push every 15 minutes  glucagon  Injectable 1 milliGRAM(s) IntraMuscular once  heparin   Injectable 5000 Unit(s) SubCutaneous every 8 hours  insulin lispro (ADMELOG) corrective regimen sliding scale   SubCutaneous every 6 hours  lacosamide IVPB 100 milliGRAM(s) IV Intermittent every 12 hours  lidocaine 2% Jelly 10 milliLiter(s) IntraUrethral once  metoprolol tartrate 25 milliGRAM(s) Oral two times a day  modafinil 200 milliGRAM(s) Oral daily  multivitamin/minerals/iron Oral Solution (CENTRUM) 15 milliLiter(s) Oral daily  pantoprazole  Injectable 40 milliGRAM(s) IV Push daily  piperacillin/tazobactam IVPB.. 3.375 Gram(s) IV Intermittent every 8 hours  sodium chloride 0.9%. 1000 milliLiter(s) (10 mL/Hr) IV Continuous <Continuous>  testosterone patch 4 mG/24 Hr(s) 4 milliGRAM(s) Transdermal daily  valproate sodium  IVPB 750 milliGRAM(s) IV Intermittent every 12 hours    MEDICATIONS  (PRN):  dextrose Oral Gel 15 Gram(s) Oral once PRN Blood Glucose LESS THAN 70 milliGRAM(s)/deciliter  fentaNYL    Injectable 25 MICROGram(s) IV Push every 3 hours PRN Severe Pain (7 - 10)      Assessment/Plan:  53yr old male with PMH HTN, type A dissection sp Dacron grafts and AV resuspension (), CAD sp CABG x 1 ([facility] 2013, SVG-RCA), seizures, admitted for surgical mgmt of progression of aneurysmal disease.     POD12 replacement of transverse aortic arch, second stage thoracic endovascular aortic repair, aorto-axillary bypass, AV replacement (bio 23mm), and CABG x 1 (SVG-RCA) (EF 75%, [facility], 3/20)  NC prn  ATN  Diuresing on bumex-will switch to q12   Serratia in BAL, on zosyn since 3/26, would do 7d course  Continue AEDs  Acute post operative anemia-trend H/H  Thrombocytopenia-improving  Start bblocker, uptitrate prn  Replete lytes prn  Monitor CT output  DC NGT  Diet as tolerated  GI/DVT PPX  Bowel Regimen  Pain control  OOB with PT  Close hemodynamic, ventilatory and drain monitoring and management per post op routine  Monitor for arrhythmias and monitor parameters for organ perfusion  Beta blockade not administered due to hemodynamic instability and bradycardia  Monitor neurologic status  Head of the bed should remain elevated to 45 deg   Chest PT and IS will be encouraged  Monitor adequacy of oxygenation and ventilation and attempt to wean oxygen  Antibiotic regimen will be tailored to the clinical, laboratory and microbiologic data  Nutritional goals will be met using po eventually, ensure adequate caloric intake and monitor the same  Stress ulcer and VTE prophylaxis will be achieved    Glycemic control is satisfactory  Electrolytes have been repleted as necessary and wound care has been carried out   Pain control has been achieved.   Aggressive physical therapy and early mobility and ambulation goals will be met   The family was updated about the course and plan.    CRITICAL CARE TIME personally provided by me  in evaluation and management, reassessments, review and interpretation of labs and x-rays, ventilator and hemodynamic management, formulating a plan and coordinating care: ___105____ MIN.  Time does not include procedural time.    CTICU ATTENDING     					  Alexx Brooks MD

## 2023-04-01 NOTE — PROGRESS NOTE ADULT - SUBJECTIVE AND OBJECTIVE BOX
Seen on AQ tolerating procedure well. Net neg 1L, with UOP 900cc/24hrs. Lytes stable, kidney function stable.       Meds:    aspirin  chewable 81 daily  chlorhexidine 2% Cloths 1 daily  dextrose 5%. 1000 <Continuous>  dextrose 5%. 1000 <Continuous>  dextrose 50% Injectable 50 every 15 minutes  dextrose 50% Injectable 25 every 15 minutes  dextrose Oral Gel 15 once PRN  fentaNYL    Injectable 25 every 3 hours PRN  glucagon  Injectable 1 once  heparin   Injectable 5000 every 8 hours  insulin lispro (ADMELOG) corrective regimen sliding scale  every 6 hours  lacosamide IVPB 100 every 12 hours  metoprolol tartrate 25 two times a day  modafinil 200 daily  multivitamin/minerals/iron Oral Solution (CENTRUM) 15 daily  pantoprazole  Injectable 40 daily  piperacillin/tazobactam IVPB.. 3.375 every 8 hours  sodium chloride 0.9%. 1000 <Continuous>  testosterone patch 4 mG/24 Hr(s) 4 daily  valproate sodium  IVPB 750 every 12 hours      T(C): , Max: 37.3 (04-01-23 @ 15:25)  T(F): , Max: 99.2 (04-01-23 @ 15:25)  HR: 114 (04-01-23 @ 17:00)  BP: 149/96 (04-01-23 @ 08:00)  BP(mean): 118 (04-01-23 @ 08:00)  RR: 12 (04-01-23 @ 17:00)  SpO2: 96% (04-01-23 @ 17:00)  Wt(kg): --    03-31 @ 07:01  -  04-01 @ 07:00  --------------------------------------------------------  IN: 1704 mL / OUT: 2710 mL / NET: -1006 mL    04-01 @ 07:01  -  04-01 @ 17:06  --------------------------------------------------------  IN: 491 mL / OUT: 1152 mL / NET: -661 mL          Review of Systems:  ROS negative except as per HPI      PHYSICAL EXAM:  GENERAL: NAD, laying in bed, tolerating AQ  NECK: supple, No JVD  CHEST/LUNG: b/l crackles appreciated on NC   HEART: normal S1S2, RRR  ABDOMEN: Soft, Nontender, +BS, No flank tenderness bilateral  EXTREMITIES: No clubbing, cyanosis, or edema   NEUROLOGY: AAO x3, no focal neurological deficit  ACCESS: Rt IJ S/Q HD Cath, accessed       LABS:                        10.6   24.18 )-----------( 219      ( 01 Apr 2023 09:55 )             30.7     04-01    142  |  106  |  60<H>  ----------------------------<  111<H>  4.2   |  26  |  3.78<H>    Ca    8.6      01 Apr 2023 09:55  Phos  3.9     04-01  Mg     2.4     04-01    TPro  5.6<L>  /  Alb  2.7<L>  /  TBili  0.9  /  DBili  x   /  AST  25  /  ALT  11  /  AlkPhos  108  04-01      PT/INR - ( 01 Apr 2023 09:55 )   PT: 14.4 sec;   INR: 1.21          PTT - ( 01 Apr 2023 09:55 )  PTT:31.7 sec          RADIOLOGY & ADDITIONAL STUDIES:

## 2023-04-01 NOTE — PROGRESS NOTE ADULT - ASSESSMENT
52 yo M with non-oliguric iATN i/s/o replacement of aortic arch and TAVR on 3/20- prolonged OR time and massive transfusion and other blood products. Initiated on CVVHD (3/21-3/24) for clearance and volume. 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 [id number]  Showing some ATN recovery, however UO not adequate for his present needs  CVVHD (3/21- 3/24)        Plan:  HD today for clearance and volume   Hemodialysis Treatment.:     Schedule: Once, Modality: Hemodialysis, Access: Internal Jugular Central Venous Catheter    Dialyzer: Optiflux [id number], Time: 210 Min    Blood Flow: 400 mL/Min , Dialysate Flow: 500 mL/Min, Dialysate Temp: 36.5, Tubinmm (Adult)    Target Fluid Removal: 2 Liters    Dialysate Electrolytes (mEq/L): Potassium 3, Calcium 2.5, Sodium 138, Bicarbonate 35    Additional Instructions: albumin w/ HD  If SBP < 120 would decrease UF to 1L   Restart intermittent aquapheresis UF rate (50-75 ml/hr) to avoid drops in BP and renal perfusion, monitor urine out put   strict I&Os  BMP per icu protocol  Maintain MAP >70 for renal perfusion       Discussed w/ CTICU team

## 2023-04-02 LAB
ALBUMIN SERPL ELPH-MCNC: 2.5 G/DL — LOW (ref 3.3–5)
ALBUMIN SERPL ELPH-MCNC: 2.6 G/DL — LOW (ref 3.3–5)
ALP SERPL-CCNC: 84 U/L — SIGNIFICANT CHANGE UP (ref 40–120)
ALP SERPL-CCNC: 90 U/L — SIGNIFICANT CHANGE UP (ref 40–120)
ALT FLD-CCNC: 10 U/L — SIGNIFICANT CHANGE UP (ref 10–45)
ALT FLD-CCNC: 12 U/L — SIGNIFICANT CHANGE UP (ref 10–45)
ANION GAP SERPL CALC-SCNC: 11 MMOL/L — SIGNIFICANT CHANGE UP (ref 5–17)
ANION GAP SERPL CALC-SCNC: 11 MMOL/L — SIGNIFICANT CHANGE UP (ref 5–17)
APTT BLD: 30.1 SEC — SIGNIFICANT CHANGE UP (ref 27.5–35.5)
APTT BLD: 33.4 SEC — SIGNIFICANT CHANGE UP (ref 27.5–35.5)
AST SERPL-CCNC: 27 U/L — SIGNIFICANT CHANGE UP (ref 10–40)
AST SERPL-CCNC: 34 U/L — SIGNIFICANT CHANGE UP (ref 10–40)
BASE EXCESS BLDA CALC-SCNC: 2.2 MMOL/L — SIGNIFICANT CHANGE UP (ref -2–3)
BASE EXCESS BLDA CALC-SCNC: 3.3 MMOL/L — HIGH (ref -2–3)
BASE EXCESS BLDV CALC-SCNC: 3.8 MMOL/L — HIGH (ref -2–3)
BILIRUB SERPL-MCNC: 0.8 MG/DL — SIGNIFICANT CHANGE UP (ref 0.2–1.2)
BILIRUB SERPL-MCNC: 1 MG/DL — SIGNIFICANT CHANGE UP (ref 0.2–1.2)
BUN SERPL-MCNC: 35 MG/DL — HIGH (ref 7–23)
BUN SERPL-MCNC: 37 MG/DL — HIGH (ref 7–23)
CALCIUM SERPL-MCNC: 8.3 MG/DL — LOW (ref 8.4–10.5)
CALCIUM SERPL-MCNC: 8.5 MG/DL — SIGNIFICANT CHANGE UP (ref 8.4–10.5)
CHLORIDE SERPL-SCNC: 102 MMOL/L — SIGNIFICANT CHANGE UP (ref 96–108)
CHLORIDE SERPL-SCNC: 104 MMOL/L — SIGNIFICANT CHANGE UP (ref 96–108)
CO2 BLDA-SCNC: 26 MMOL/L — HIGH (ref 19–24)
CO2 BLDA-SCNC: 29 MMOL/L — HIGH (ref 19–24)
CO2 BLDV-SCNC: 30.6 MMOL/L — HIGH (ref 22–26)
CO2 SERPL-SCNC: 25 MMOL/L — SIGNIFICANT CHANGE UP (ref 22–31)
CO2 SERPL-SCNC: 27 MMOL/L — SIGNIFICANT CHANGE UP (ref 22–31)
CREAT SERPL-MCNC: 2.67 MG/DL — HIGH (ref 0.5–1.3)
CREAT SERPL-MCNC: 2.95 MG/DL — HIGH (ref 0.5–1.3)
EGFR: 24 ML/MIN/1.73M2 — LOW
EGFR: 28 ML/MIN/1.73M2 — LOW
GAS PNL BLDA: SIGNIFICANT CHANGE UP
GAS PNL BLDV: SIGNIFICANT CHANGE UP
GLUCOSE BLDC GLUCOMTR-MCNC: 117 MG/DL — HIGH (ref 70–99)
GLUCOSE BLDC GLUCOMTR-MCNC: 118 MG/DL — HIGH (ref 70–99)
GLUCOSE BLDC GLUCOMTR-MCNC: 154 MG/DL — HIGH (ref 70–99)
GLUCOSE BLDC GLUCOMTR-MCNC: 46 MG/DL — CRITICAL LOW (ref 70–99)
GLUCOSE BLDC GLUCOMTR-MCNC: 63 MG/DL — LOW (ref 70–99)
GLUCOSE SERPL-MCNC: 137 MG/DL — HIGH (ref 70–99)
GLUCOSE SERPL-MCNC: 84 MG/DL — SIGNIFICANT CHANGE UP (ref 70–99)
HCO3 BLDA-SCNC: 25 MMOL/L — SIGNIFICANT CHANGE UP (ref 21–28)
HCO3 BLDA-SCNC: 28 MMOL/L — SIGNIFICANT CHANGE UP (ref 21–28)
HCO3 BLDV-SCNC: 29 MMOL/L — SIGNIFICANT CHANGE UP (ref 22–29)
HCT VFR BLD CALC: 27.9 % — LOW (ref 39–50)
HCT VFR BLD CALC: 28.7 % — LOW (ref 39–50)
HGB BLD-MCNC: 9.5 G/DL — LOW (ref 13–17)
HGB BLD-MCNC: 9.6 G/DL — LOW (ref 13–17)
INR BLD: 1.27 — HIGH (ref 0.88–1.16)
INR BLD: 1.28 — HIGH (ref 0.88–1.16)
LACTATE SERPL-SCNC: 0.8 MMOL/L — SIGNIFICANT CHANGE UP (ref 0.5–2)
MAGNESIUM SERPL-MCNC: 2 MG/DL — SIGNIFICANT CHANGE UP (ref 1.6–2.6)
MAGNESIUM SERPL-MCNC: 2 MG/DL — SIGNIFICANT CHANGE UP (ref 1.6–2.6)
MCHC RBC-ENTMCNC: 30.2 PG — SIGNIFICANT CHANGE UP (ref 27–34)
MCHC RBC-ENTMCNC: 31.2 PG — SIGNIFICANT CHANGE UP (ref 27–34)
MCHC RBC-ENTMCNC: 33.1 GM/DL — SIGNIFICANT CHANGE UP (ref 32–36)
MCHC RBC-ENTMCNC: 34.4 GM/DL — SIGNIFICANT CHANGE UP (ref 32–36)
MCV RBC AUTO: 90.6 FL — SIGNIFICANT CHANGE UP (ref 80–100)
MCV RBC AUTO: 91.1 FL — SIGNIFICANT CHANGE UP (ref 80–100)
NRBC # BLD: 0 /100 WBCS — SIGNIFICANT CHANGE UP (ref 0–0)
NRBC # BLD: 0 /100 WBCS — SIGNIFICANT CHANGE UP (ref 0–0)
PCO2 BLDA: 33 MMHG — LOW (ref 35–48)
PCO2 BLDA: 41 MMHG — SIGNIFICANT CHANGE UP (ref 35–48)
PCO2 BLDV: 46 MMHG — SIGNIFICANT CHANGE UP (ref 42–55)
PH BLDA: 7.44 — SIGNIFICANT CHANGE UP (ref 7.35–7.45)
PH BLDA: 7.49 — HIGH (ref 7.35–7.45)
PH BLDV: 7.41 — SIGNIFICANT CHANGE UP (ref 7.32–7.43)
PHOSPHATE SERPL-MCNC: 3.7 MG/DL — SIGNIFICANT CHANGE UP (ref 2.5–4.5)
PLATELET # BLD AUTO: 186 K/UL — SIGNIFICANT CHANGE UP (ref 150–400)
PLATELET # BLD AUTO: 214 K/UL — SIGNIFICANT CHANGE UP (ref 150–400)
PO2 BLDA: 72 MMHG — LOW (ref 83–108)
PO2 BLDA: 85 MMHG — SIGNIFICANT CHANGE UP (ref 83–108)
PO2 BLDV: 41 MMHG — SIGNIFICANT CHANGE UP (ref 25–45)
POTASSIUM SERPL-MCNC: 3.7 MMOL/L — SIGNIFICANT CHANGE UP (ref 3.5–5.3)
POTASSIUM SERPL-MCNC: 3.7 MMOL/L — SIGNIFICANT CHANGE UP (ref 3.5–5.3)
POTASSIUM SERPL-SCNC: 3.7 MMOL/L — SIGNIFICANT CHANGE UP (ref 3.5–5.3)
POTASSIUM SERPL-SCNC: 3.7 MMOL/L — SIGNIFICANT CHANGE UP (ref 3.5–5.3)
PROT SERPL-MCNC: 5.3 G/DL — LOW (ref 6–8.3)
PROT SERPL-MCNC: 5.4 G/DL — LOW (ref 6–8.3)
PROTHROM AB SERPL-ACNC: 15.2 SEC — HIGH (ref 10.5–13.4)
PROTHROM AB SERPL-ACNC: 15.3 SEC — HIGH (ref 10.5–13.4)
RBC # BLD: 3.08 M/UL — LOW (ref 4.2–5.8)
RBC # BLD: 3.15 M/UL — LOW (ref 4.2–5.8)
RBC # FLD: 15 % — HIGH (ref 10.3–14.5)
RBC # FLD: 15.2 % — HIGH (ref 10.3–14.5)
SAO2 % BLDA: 97.1 % — SIGNIFICANT CHANGE UP (ref 94–98)
SAO2 % BLDA: 98 % — SIGNIFICANT CHANGE UP (ref 94–98)
SAO2 % BLDV: 68.9 % — SIGNIFICANT CHANGE UP (ref 67–88)
SODIUM SERPL-SCNC: 140 MMOL/L — SIGNIFICANT CHANGE UP (ref 135–145)
SODIUM SERPL-SCNC: 140 MMOL/L — SIGNIFICANT CHANGE UP (ref 135–145)
WBC # BLD: 20.19 K/UL — HIGH (ref 3.8–10.5)
WBC # BLD: 22.82 K/UL — HIGH (ref 3.8–10.5)
WBC # FLD AUTO: 20.19 K/UL — HIGH (ref 3.8–10.5)
WBC # FLD AUTO: 22.82 K/UL — HIGH (ref 3.8–10.5)

## 2023-04-02 RX ORDER — OXYCODONE HYDROCHLORIDE 5 MG/1
5 TABLET ORAL EVERY 6 HOURS
Refills: 0 | Status: DISCONTINUED | OUTPATIENT
Start: 2023-04-02 | End: 2023-04-05

## 2023-04-02 RX ORDER — DEXTROSE 10 % IN WATER 10 %
250 INTRAVENOUS SOLUTION INTRAVENOUS
Refills: 0 | Status: DISCONTINUED | OUTPATIENT
Start: 2023-04-02 | End: 2023-04-04

## 2023-04-02 RX ORDER — OXYCODONE HYDROCHLORIDE 5 MG/1
10 TABLET ORAL EVERY 6 HOURS
Refills: 0 | Status: DISCONTINUED | OUTPATIENT
Start: 2023-04-02 | End: 2023-04-03

## 2023-04-02 RX ADMIN — Medication 4 MILLIGRAM(S): at 11:43

## 2023-04-02 RX ADMIN — Medication 4 MILLIGRAM(S): at 11:50

## 2023-04-02 RX ADMIN — PIPERACILLIN AND TAZOBACTAM 25 GRAM(S): 4; .5 INJECTION, POWDER, LYOPHILIZED, FOR SOLUTION INTRAVENOUS at 14:27

## 2023-04-02 RX ADMIN — Medication 15 MILLILITER(S): at 18:22

## 2023-04-02 RX ADMIN — LACOSAMIDE 120 MILLIGRAM(S): 50 TABLET ORAL at 19:18

## 2023-04-02 RX ADMIN — Medication 25 MILLIGRAM(S): at 18:15

## 2023-04-02 RX ADMIN — Medication 57.5 MILLIGRAM(S): at 07:22

## 2023-04-02 RX ADMIN — MODAFINIL 200 MILLIGRAM(S): 200 TABLET ORAL at 11:42

## 2023-04-02 RX ADMIN — PIPERACILLIN AND TAZOBACTAM 25 GRAM(S): 4; .5 INJECTION, POWDER, LYOPHILIZED, FOR SOLUTION INTRAVENOUS at 05:33

## 2023-04-02 RX ADMIN — OXYCODONE HYDROCHLORIDE 10 MILLIGRAM(S): 5 TABLET ORAL at 15:45

## 2023-04-02 RX ADMIN — PANTOPRAZOLE SODIUM 40 MILLIGRAM(S): 20 TABLET, DELAYED RELEASE ORAL at 11:43

## 2023-04-02 RX ADMIN — Medication 250 MILLILITER(S): at 09:51

## 2023-04-02 RX ADMIN — CHLORHEXIDINE GLUCONATE 1 APPLICATION(S): 213 SOLUTION TOPICAL at 05:36

## 2023-04-02 RX ADMIN — Medication 57.5 MILLIGRAM(S): at 18:15

## 2023-04-02 RX ADMIN — Medication 4 MILLIGRAM(S): at 07:26

## 2023-04-02 RX ADMIN — HEPARIN SODIUM 5000 UNIT(S): 5000 INJECTION INTRAVENOUS; SUBCUTANEOUS at 05:33

## 2023-04-02 RX ADMIN — HEPARIN SODIUM 5000 UNIT(S): 5000 INJECTION INTRAVENOUS; SUBCUTANEOUS at 14:27

## 2023-04-02 RX ADMIN — Medication 4 MILLIGRAM(S): at 18:22

## 2023-04-02 RX ADMIN — LACOSAMIDE 120 MILLIGRAM(S): 50 TABLET ORAL at 07:21

## 2023-04-02 RX ADMIN — HEPARIN SODIUM 5000 UNIT(S): 5000 INJECTION INTRAVENOUS; SUBCUTANEOUS at 23:00

## 2023-04-02 RX ADMIN — Medication 81 MILLIGRAM(S): at 11:42

## 2023-04-02 RX ADMIN — OXYCODONE HYDROCHLORIDE 10 MILLIGRAM(S): 5 TABLET ORAL at 14:54

## 2023-04-02 RX ADMIN — Medication 25 MILLIGRAM(S): at 05:34

## 2023-04-02 NOTE — PROGRESS NOTE ADULT - SUBJECTIVE AND OBJECTIVE BOX
CTICU  CRITICAL  CARE  attending     Hand off received 					   Pertinent clinical, laboratory, radiographic, hemodynamic, echocardiographic, respiratory data, microbiologic data and chart were reviewed and analyzed frequently throughout the course of the day  Patient seen and examined with CTS/ SH attending at bedside  Pt is a 53yr old male with PMH HTN, type A dissection sp Dacron grafts and AV resuspension (2013), CAD sp CABG x 1 ([facility], 5/2013, SVG-RCA), seizures, admitted for surgical mgmt of progression of aneurysmal disease. Patient underwent replacement of transverse aortic arch, second stage thoracic endovascular aortic repair, aorto-axillary bypass, AV replacement (bio 23mm), and CABG x 1 (SVG-RCA) EF 75% with Dr. Pierre 3/20. Patient received 7 units pRBC, 6 FFP, 4 plt, 15 cryo, 1000 FEIBA, and uo 1300cc. Started on lasix infusion and phenylephrine, bicarb, Armaan, levo and vaso, with lactic acidosis. 3/21 L femoral HD cath placed and CVVHD started with improvement in acidosis. Pressors off by end of day. Primacor weaned overnight. Patient woke up and follows commands, exhibited facial twitching cw prior seizures per wife and ativan given followed by vEEG placement. Neurology consulted for recs given subtherapeutic AED levels. Given 2 units pRBC. 3/22 Neurology recommended dc vEEG given low suspicion for seizures. Fluid removal initiated with CVVHD. Overnight poor oxygenation requiring bronchoscopy, on laquita and vaso, D10 started for hypoglycemia. 3/23 large residuals, reglan started. CTH performed for poor mentation-no bleed. Amio given for afib. Received 1 unit pRBC and 1 plt, femoral a line removed. CVVHD stopped and diuretic initiated with good response. Primacor turned off. 3/25 new TLC and R cordis with swan placed, tolerated cpap. D10 for hypoglycemia. Underwent aquaphoresis via new LIJ HD cath. Low grade fevers. Overnight swan dc'd. 3/27 New RIJ HDC placed for malfunctioning LIJ, underwent HD with 3.5L removed. Junctional rhythm overnight. 3/29 extubated to HFNC, passed SLP. 3/30 placed on BIPAP for tachypnea, underwent bronchoscopy, new LIJ TLC placed for access, HD performed. TF via dobhoff. L pigtail placed with 700cc out. 4/1 Underwent HD followed by back on aquaphoresis. Tolerating PO diet.     FAMILY HISTORY:  No pertinent family history in first degree relatives  PAST MEDICAL & SURGICAL HISTORY:  HTN (hypertension)  Aortic dissection  CAD (coronary artery disease)  Seizure disorder  S/P aortic bifurcation bypass graft  S/P CABG x 1        Patient is a 53y old  Male who presents with a chief complaint of expanding aortic arch and descending aortic aneurysm.      14 system review was unremarkable    Vital signs, hemodynamic and respiratory parameters were reviewed from the bedside nursing flowsheet.  ICU Vital Signs Last 24 Hrs  T(C): 37.7 (02 Apr 2023 13:43), Max: 37.7 (02 Apr 2023 13:43)  T(F): 99.8 (02 Apr 2023 13:43), Max: 99.8 (02 Apr 2023 13:43)  HR: 97 (02 Apr 2023 20:00) (94 - 117)  BP: --  BP(mean): --  ABP: 115/80 (02 Apr 2023 20:00) (90/62 - 162/92)  ABP(mean): 93 (02 Apr 2023 20:00) (73 - 119)  RR: 12 (02 Apr 2023 20:00) (12 - 19)  SpO2: 95% (02 Apr 2023 20:00) (92% - 100%)    O2 Parameters below as of 02 Apr 2023 20:00  Patient On (Oxygen Delivery Method): nasal cannula w/ humidification  O2 Flow (L/min): 2        Adult Advanced Hemodynamics Last 24 Hrs  CVP(mm Hg): --  CVP(cm H2O): --  CO: --  CI: --  PA: --  PA(mean): --  PCWP: --  SVR: --  SVRI: --  PVR: --  PVRI: --, ABG - ( 02 Apr 2023 13:31 )  pH, Arterial: 7.49  pH, Blood: x     /  pCO2: 33    /  pO2: 72    / HCO3: 25    / Base Excess: 2.2   /  SaO2: 97.1                Intake and output was reviewed and the fluid balance was calculated  Daily     Daily   I&O's Summary    01 Apr 2023 07:01  -  02 Apr 2023 07:00  --------------------------------------------------------  IN: 941 mL / OUT: 6581 mL / NET: -5640 mL    02 Apr 2023 07:01  -  02 Apr 2023 20:59  --------------------------------------------------------  IN: 542 mL / OUT: 1003 mL / NET: -461 mL        All lines and drain sites were assessed  Glycemic trend was reviewedCAPILLARY BLOOD GLUCOSE      POCT Blood Glucose.: 117 mg/dL (02 Apr 2023 16:16)      Neuro:  HEENT:  Heart:  Lungs:  Abdomen:  Extremities:    Lines:    Tubes:      labs  CBC Full  -  ( 02 Apr 2023 13:31 )  WBC Count : 20.19 K/uL  RBC Count : 3.15 M/uL  Hemoglobin : 9.5 g/dL  Hematocrit : 28.7 %  Platelet Count - Automated : 214 K/uL  Mean Cell Volume : 91.1 fl  Mean Cell Hemoglobin : 30.2 pg  Mean Cell Hemoglobin Concentration : 33.1 gm/dL  Auto Neutrophil # : x  Auto Lymphocyte # : x  Auto Monocyte # : x  Auto Eosinophil # : x  Auto Basophil # : x  Auto Neutrophil % : x  Auto Lymphocyte % : x  Auto Monocyte % : x  Auto Eosinophil % : x  Auto Basophil % : x    04-02    140  |  104  |  37<H>  ----------------------------<  137<H>  3.7   |  25  |  2.95<H>    Ca    8.3<L>      02 Apr 2023 13:31  Phos  3.7     04-02  Mg     2.0     04-02    TPro  5.4<L>  /  Alb  2.6<L>  /  TBili  0.8  /  DBili  x   /  AST  34  /  ALT  12  /  AlkPhos  84  04-02    PT/INR - ( 02 Apr 2023 13:31 )   PT: 15.2 sec;   INR: 1.27          PTT - ( 02 Apr 2023 13:31 )  PTT:30.1 sec  The current medications were reviewed   MEDICATIONS  (STANDING):  aspirin  chewable 81 milliGRAM(s) Oral daily  chlorhexidine 2% Cloths 1 Application(s) Topical daily  dextrose 10%. 250 milliLiter(s) (250 mL/Hr) IV Continuous <Continuous>  dextrose 5%. 1000 milliLiter(s) (50 mL/Hr) IV Continuous <Continuous>  dextrose 5%. 1000 milliLiter(s) (100 mL/Hr) IV Continuous <Continuous>  dextrose 50% Injectable 50 milliLiter(s) IV Push every 15 minutes  dextrose 50% Injectable 25 milliLiter(s) IV Push every 15 minutes  glucagon  Injectable 1 milliGRAM(s) IntraMuscular once  heparin   Injectable 5000 Unit(s) SubCutaneous every 8 hours  insulin lispro (ADMELOG) corrective regimen sliding scale   SubCutaneous every 6 hours  lacosamide IVPB 100 milliGRAM(s) IV Intermittent every 12 hours  lidocaine 2% Jelly 10 milliLiter(s) IntraUrethral once  metoprolol tartrate 25 milliGRAM(s) Oral two times a day  modafinil 200 milliGRAM(s) Oral daily  multivitamin/minerals/iron Oral Solution (CENTRUM) 15 milliLiter(s) Oral daily  pantoprazole  Injectable 40 milliGRAM(s) IV Push daily  sodium chloride 0.9%. 1000 milliLiter(s) (10 mL/Hr) IV Continuous <Continuous>  testosterone patch 4 mG/24 Hr(s) 4 milliGRAM(s) Transdermal daily  valproate sodium  IVPB 750 milliGRAM(s) IV Intermittent every 12 hours    MEDICATIONS  (PRN):  dextrose Oral Gel 15 Gram(s) Oral once PRN Blood Glucose LESS THAN 70 milliGRAM(s)/deciliter  oxyCODONE    IR 5 milliGRAM(s) Oral every 6 hours PRN Moderate Pain (4 - 6)  oxyCODONE    IR 10 milliGRAM(s) Oral every 6 hours PRN Severe Pain (7 - 10)      Assessment/Plan:         s/p cardiac surgery    Acute systolic and diastolic heart failure evidenced by SOB and parenchymal infiltrates; will treat with diuresis    Cardiogenic shock on ionotropy    Vasogenic shock due to hypotension in the cticu , will keep on pressors    Hypovolemic shock - > 20% intravascular depletion will replete volume    Acute blood loss anemia with relative hypotension treated with > 1 unit PC    Acute respiratory failure ruled in due to hypoxemia, O2 sats < 91% on RA treated with HFNC    Acute respiratory failure ruled in due to hypercapnea on abg will treat with mechanical ventilation    Acute respiratory failure ruled in due to prolonged mechanical ventilation > 24 hrs on the vent due to failure to wean due to weak respiratory mechanics    Toxic metabolic encephalopathy ; sundowning due to anesthesia pain medications    Acidosis evidenced by anion gap and negative base excess    Acute kidney injury - creatinine > 0.3 due to combined prerenal and intrarenal factors can presume ATN    ESRD    Acute venu-operative ischemic stroke    Moderate protein calorie malutrition    Diet as tolerated  Replete lytes prn  Monitor CT output  GI/DVT PPX  Bowel Regimen  Pain control  OOB with PT      Close hemodynamic, ventilatory and drain monitoring and management per post op routine  Monitor for arrhythmias and monitor parameters for organ perfusion  Beta blockade not administered due to hemodynamic instability and bradycardia  Monitor neurologic status  Head of the bed should remain elevated to 45 deg   Chest PT and IS will be encouraged  Monitor adequacy of oxygenation and ventilation and attempt to wean oxygen  Antibiotic regimen will be tailored to the clinical, laboratory and microbiologic data  Nutritional goals will be met using po eventually, ensure adequate caloric intake and monitor the same  Stress ulcer and VTE prophylaxis will be achieved    Glycemic control is satisfactory  Electrolytes have been repleted as necessary and wound care has been carried out   Pain control has been achieved.   Aggressive physical therapy and early mobility and ambulation goals will be met   The family was updated about the course and plan.    CRITICAL CARE TIME personally provided by me  in evaluation and management, reassessments, review and interpretation of labs and x-rays, ventilator and hemodynamic management, formulating a plan and coordinating care: ___35___ MIN.  Time does not include procedural time.    CTICU ATTENDING     					  Alexx Brooks MD CTICU  CRITICAL  CARE  attending     Hand off received 					   Pertinent clinical, laboratory, radiographic, hemodynamic, echocardiographic, respiratory data, microbiologic data and chart were reviewed and analyzed frequently throughout the course of the day  Patient seen and examined with CTS/ SH attending at bedside  Pt is a 53yr old male with PMH HTN, type A dissection sp Dacron grafts and AV resuspension (2013), CAD sp CABG x 1 ([facility], 5/2013, SVG-RCA), seizures, admitted for surgical mgmt of progression of aneurysmal disease. Patient underwent replacement of transverse aortic arch, second stage thoracic endovascular aortic repair, aorto-axillary bypass, AV replacement (bio 23mm), and CABG x 1 (SVG-RCA) EF 75% with Dr. Pierre 3/20. Patient received 7 units pRBC, 6 FFP, 4 plt, 15 cryo, 1000 FEIBA, and uo 1300cc. Started on lasix infusion and phenylephrine, bicarb, Armaan, levo and vaso, with lactic acidosis. 3/21 L femoral HD cath placed and CVVHD started with improvement in acidosis. Pressors off by end of day. Primacor weaned overnight. Patient woke up and follows commands, exhibited facial twitching cw prior seizures per wife and ativan given followed by vEEG placement. Neurology consulted for recs given subtherapeutic AED levels. Given 2 units pRBC. 3/22 Neurology recommended dc vEEG given low suspicion for seizures. Fluid removal initiated with CVVHD. Overnight poor oxygenation requiring bronchoscopy, on laquita and vaso, D10 started for hypoglycemia. 3/23 large residuals, reglan started. CTH performed for poor mentation-no bleed. Amio given for afib. Received 1 unit pRBC and 1 plt, femoral a line removed. CVVHD stopped and diuretic initiated with good response. Primacor turned off. 3/25 new TLC and R cordis with swan placed, tolerated cpap. D10 for hypoglycemia. Underwent aquaphoresis via new LIJ HD cath. Low grade fevers. Overnight swan dc'd. 3/27 New RIJ HDC placed for malfunctioning LIJ, underwent HD with 3.5L removed. Junctional rhythm overnight. 3/29 extubated to HFNC, passed SLP. 3/30 placed on BIPAP for tachypnea, underwent bronchoscopy, new LIJ TLC placed for access, HD performed. TF via dobhoff. L pigtail placed with 700cc out. 4/1 Underwent HD followed by back on aquaphoresis. Tolerating PO diet.     FAMILY HISTORY:  No pertinent family history in first degree relatives  PAST MEDICAL & SURGICAL HISTORY:  HTN (hypertension)  Aortic dissection  CAD (coronary artery disease)  Seizure disorder  S/P aortic bifurcation bypass graft  S/P CABG x 1        Patient is a 53y old  Male who presents with a chief complaint of expanding aortic arch and descending aortic aneurysm.      14 system review was unremarkable    Vital signs, hemodynamic and respiratory parameters were reviewed from the bedside nursing flowsheet.  ICU Vital Signs Last 24 Hrs  T(C): 37.7 (02 Apr 2023 13:43), Max: 37.7 (02 Apr 2023 13:43)  T(F): 99.8 (02 Apr 2023 13:43), Max: 99.8 (02 Apr 2023 13:43)  HR: 97 (02 Apr 2023 20:00) (94 - 117)  BP: --  BP(mean): --  ABP: 115/80 (02 Apr 2023 20:00) (90/62 - 162/92)  ABP(mean): 93 (02 Apr 2023 20:00) (73 - 119)  RR: 12 (02 Apr 2023 20:00) (12 - 19)  SpO2: 95% (02 Apr 2023 20:00) (92% - 100%)    O2 Parameters below as of 02 Apr 2023 20:00  Patient On (Oxygen Delivery Method): nasal cannula w/ humidification  O2 Flow (L/min): 2        Adult Advanced Hemodynamics Last 24 Hrs  CVP(mm Hg): --  CVP(cm H2O): --  CO: --  CI: --  PA: --  PA(mean): --  PCWP: --  SVR: --  SVRI: --  PVR: --  PVRI: --, ABG - ( 02 Apr 2023 13:31 )  pH, Arterial: 7.49  pH, Blood: x     /  pCO2: 33    /  pO2: 72    / HCO3: 25    / Base Excess: 2.2   /  SaO2: 97.1                Intake and output was reviewed and the fluid balance was calculated  Daily     Daily   I&O's Summary    01 Apr 2023 07:01  -  02 Apr 2023 07:00  --------------------------------------------------------  IN: 941 mL / OUT: 6581 mL / NET: -5640 mL    02 Apr 2023 07:01  -  02 Apr 2023 20:59  --------------------------------------------------------  IN: 542 mL / OUT: 1003 mL / NET: -461 mL        All lines and drain sites were assessed  Glycemic trend was reviewedCAPILLARY BLOOD GLUCOSE      POCT Blood Glucose.: 117 mg/dL (02 Apr 2023 16:16)      Neuro: follows commands  HEENT: nc  Heart: s1 s2   Lungs: clear bl  Abdomen: soft nt nd  Extremities: 2+dp    Lines:  LIJ TLC 3/30  RIJ HD cath 3/27  R radial arterial line 3/20    Tubes:  Med bella x 3  Pleural bella x 2  L pigtail      labs  CBC Full  -  ( 02 Apr 2023 13:31 )  WBC Count : 20.19 K/uL  RBC Count : 3.15 M/uL  Hemoglobin : 9.5 g/dL  Hematocrit : 28.7 %  Platelet Count - Automated : 214 K/uL  Mean Cell Volume : 91.1 fl  Mean Cell Hemoglobin : 30.2 pg  Mean Cell Hemoglobin Concentration : 33.1 gm/dL  Auto Neutrophil # : x  Auto Lymphocyte # : x  Auto Monocyte # : x  Auto Eosinophil # : x  Auto Basophil # : x  Auto Neutrophil % : x  Auto Lymphocyte % : x  Auto Monocyte % : x  Auto Eosinophil % : x  Auto Basophil % : x    04-02    140  |  104  |  37<H>  ----------------------------<  137<H>  3.7   |  25  |  2.95<H>    Ca    8.3<L>      02 Apr 2023 13:31  Phos  3.7     04-02  Mg     2.0     04-02    TPro  5.4<L>  /  Alb  2.6<L>  /  TBili  0.8  /  DBili  x   /  AST  34  /  ALT  12  /  AlkPhos  84  04-02    PT/INR - ( 02 Apr 2023 13:31 )   PT: 15.2 sec;   INR: 1.27          PTT - ( 02 Apr 2023 13:31 )  PTT:30.1 sec  The current medications were reviewed   MEDICATIONS  (STANDING):  aspirin  chewable 81 milliGRAM(s) Oral daily  chlorhexidine 2% Cloths 1 Application(s) Topical daily  dextrose 10%. 250 milliLiter(s) (250 mL/Hr) IV Continuous <Continuous>  dextrose 5%. 1000 milliLiter(s) (50 mL/Hr) IV Continuous <Continuous>  dextrose 5%. 1000 milliLiter(s) (100 mL/Hr) IV Continuous <Continuous>  dextrose 50% Injectable 50 milliLiter(s) IV Push every 15 minutes  dextrose 50% Injectable 25 milliLiter(s) IV Push every 15 minutes  glucagon  Injectable 1 milliGRAM(s) IntraMuscular once  heparin   Injectable 5000 Unit(s) SubCutaneous every 8 hours  insulin lispro (ADMELOG) corrective regimen sliding scale   SubCutaneous every 6 hours  lacosamide IVPB 100 milliGRAM(s) IV Intermittent every 12 hours  lidocaine 2% Jelly 10 milliLiter(s) IntraUrethral once  metoprolol tartrate 25 milliGRAM(s) Oral two times a day  modafinil 200 milliGRAM(s) Oral daily  multivitamin/minerals/iron Oral Solution (CENTRUM) 15 milliLiter(s) Oral daily  pantoprazole  Injectable 40 milliGRAM(s) IV Push daily  sodium chloride 0.9%. 1000 milliLiter(s) (10 mL/Hr) IV Continuous <Continuous>  testosterone patch 4 mG/24 Hr(s) 4 milliGRAM(s) Transdermal daily  valproate sodium  IVPB 750 milliGRAM(s) IV Intermittent every 12 hours    MEDICATIONS  (PRN):  dextrose Oral Gel 15 Gram(s) Oral once PRN Blood Glucose LESS THAN 70 milliGRAM(s)/deciliter  oxyCODONE    IR 5 milliGRAM(s) Oral every 6 hours PRN Moderate Pain (4 - 6)  oxyCODONE    IR 10 milliGRAM(s) Oral every 6 hours PRN Severe Pain (7 - 10)      Assessment/Plan:  53yr old male with PMH HTN, type A dissection sp Dacron grafts and AV resuspension (2013), CAD sp CABG x 1 ([facility], 5/2013, SVG-RCA), seizures, admitted for surgical mgmt of progression of aneurysmal disease.     POD13 replacement of transverse aortic arch, second stage thoracic endovascular aortic repair, aorto-axillary bypass, AV replacement (bio 23mm), and CABG x 1 (SVG-RCA) ([facility])  NC prn  ATN, hold AQ  Hold diuresis  Serratia in BAL, on zosyn since 3/26, would do 7d course  Continue AEDs  Acute post operative anemia-trend H/H  Thrombocytopenia-improving  Start bblocker, uptitrate prn  Replete lytes prn  Monitor CT output  DC NGT  Diet as tolerated  GI/DVT PPX  Bowel Regimen  Pain control  OOB with PT  Close hemodynamic, ventilatory and drain monitoring and management per post op routine  Monitor for arrhythmias and monitor parameters for organ perfusion  Beta blockade not administered due to hemodynamic instability and bradycardia  Monitor neurologic status  Head of the bed should remain elevated to 45 deg   Chest PT and IS will be encouraged  Monitor adequacy of oxygenation and ventilation and attempt to wean oxygen  Antibiotic regimen will be tailored to the clinical, laboratory and microbiologic data  Nutritional goals will be met using po eventually, ensure adequate caloric intake and monitor the same  Stress ulcer and VTE prophylaxis will be achieved    Glycemic control is satisfactory  Electrolytes have been repleted as necessary and wound care has been carried out   Pain control has been achieved.   Aggressive physical therapy and early mobility and ambulation goals will be met   The family was updated about the course and plan.    CRITICAL CARE TIME personally provided by me  in evaluation and management, reassessments, review and interpretation of labs and x-rays, ventilator and hemodynamic management, formulating a plan and coordinating care: ___35___ MIN.  Time does not include procedural time.    CTICU ATTENDING     					  Alexx Brooks MD

## 2023-04-02 NOTE — CHART NOTE - NSCHARTNOTEFT_GEN_A_CORE
CT Removal:    Pt seen and examined at bedside.  Case discussed with Dr. Flood and is recommending removal of CT.  Minimal output from CT.  No air leak appreciated.  CT removed without incident.  Occlusive dressing placed. Follow up CXR with no obvious PTX noted.  Pt tolerated procedure well and remained hemodynamically stable.

## 2023-04-02 NOTE — PROGRESS NOTE ADULT - SUBJECTIVE AND OBJECTIVE BOX
Patient is a 53y Male seen and evaluated at bedside. No acute distress, does not offer any complaints. Tolerated HD yesterday with 3L UF and intermittent AQ with 1.5LUF net negative 5.6L. Lytes stable volume status acceptable       Meds:    aspirin  chewable 81 daily  chlorhexidine 2% Cloths 1 daily  dextrose 10%. 250 <Continuous>  dextrose 5%. 1000 <Continuous>  dextrose 5%. 1000 <Continuous>  dextrose 50% Injectable 50 every 15 minutes  dextrose 50% Injectable 25 every 15 minutes  dextrose Oral Gel 15 once PRN  glucagon  Injectable 1 once  heparin   Injectable 5000 every 8 hours  insulin lispro (ADMELOG) corrective regimen sliding scale  every 6 hours  lacosamide IVPB 100 every 12 hours  lidocaine 2% Jelly 10 once  metoprolol tartrate 25 two times a day  modafinil 200 daily  multivitamin/minerals/iron Oral Solution (CENTRUM) 15 daily  pantoprazole  Injectable 40 daily  piperacillin/tazobactam IVPB.. 3.375 every 8 hours  sodium chloride 0.9%. 1000 <Continuous>  testosterone patch 4 mG/24 Hr(s) 4 daily  valproate sodium  IVPB 750 every 12 hours      T(C): , Max: 37.7 (04-02-23 @ 13:43)  T(F): , Max: 99.8 (04-02-23 @ 13:43)  HR: 100 (04-02-23 @ 13:00)  BP: --  BP(mean): --  RR: 12 (04-02-23 @ 13:00)  SpO2: 92% (04-02-23 @ 13:00)  Wt(kg): --    04-01 @ 07:01  -  04-02 @ 07:00  --------------------------------------------------------  IN: 941 mL / OUT: 6581 mL / NET: -5640 mL    04-02 @ 07:01  -  04-02 @ 14:27  --------------------------------------------------------  IN: 310 mL / OUT: 540 mL / NET: -230 mL          Review of Systems:  ROS negative except as per HPI      PHYSICAL EXAM:  GENERAL: NAD, laying in bed   NECK: supple, No JVD  CHEST/LUNG: CTLA B/L, on NC   HEART: normal S1S2, RRR  ABDOMEN: Soft, Nontender, +BS, No flank tenderness bilateral  EXTREMITIES: No clubbing, cyanosis, or edema   NEUROLOGY: AAO x3, no focal neurological deficit  ACCESS: Rt IJ S/Q HD Cath c/d/i        LABS:                        9.5    20.19 )-----------( 214      ( 02 Apr 2023 13:31 )             28.7     04-02    140  |  104  |  x   ----------------------------<  137<H>  3.7   |  25  |  x     Ca    8.5      02 Apr 2023 02:27  Phos  3.7     04-02  Mg     2.0     04-02    TPro  5.3<L>  /  Alb  2.5<L>  /  TBili  1.0  /  DBili  x   /  AST  27  /  ALT  10  /  AlkPhos  90  04-02      PT/INR - ( 02 Apr 2023 13:31 )   PT: 15.2 sec;   INR: 1.27          PTT - ( 02 Apr 2023 13:31 )  PTT:30.1 sec          RADIOLOGY & ADDITIONAL STUDIES:

## 2023-04-02 NOTE — PROGRESS NOTE ADULT - ASSESSMENT
54 yo M with non-oliguric iATN i/s/o replacement of aortic arch and TAVR on 3/20- prolonged OR time and massive transfusion and other blood products. Initiated on CVVHD (3/21-3/24) for clearance and volume. Started on diuresis on 3/24 with good response but remained vol overloaded, so started iHD 3/27 for volume and intermittent AQ on 3/31, last HD 4/1 net negative 5.6L. UP 700cc.     Assessment/Plan:     #Non-oliguric iATN due to intra-op hemodynamic changes, dialysis dependent  BCr 1.2, eGFR 67 (3/9)  UA (3/25) w/ proteinuria and mod blood w/o RBCs. [id number]  Showing some ATN recovery, however UO not adequate for his present needs  CVVHD (3/21- 3/24)  AQ (3/31-4/2)        Plan:  Stable lytes and volume status will hold off on further AQ, reassess volume status and clearance for iHD    strict I&Os  BMP per icu protocol  Maintain MAP >70 for renal perfusion       Discussed w/ CTICU team

## 2023-04-03 LAB
ALBUMIN SERPL ELPH-MCNC: 2.4 G/DL — LOW (ref 3.3–5)
ALBUMIN SERPL ELPH-MCNC: 2.5 G/DL — LOW (ref 3.3–5)
ALP SERPL-CCNC: 77 U/L — SIGNIFICANT CHANGE UP (ref 40–120)
ALP SERPL-CCNC: 81 U/L — SIGNIFICANT CHANGE UP (ref 40–120)
ALT FLD-CCNC: 10 U/L — SIGNIFICANT CHANGE UP (ref 10–45)
ALT FLD-CCNC: 10 U/L — SIGNIFICANT CHANGE UP (ref 10–45)
ANION GAP SERPL CALC-SCNC: 10 MMOL/L — SIGNIFICANT CHANGE UP (ref 5–17)
ANION GAP SERPL CALC-SCNC: 11 MMOL/L — SIGNIFICANT CHANGE UP (ref 5–17)
APTT BLD: 29.7 SEC — SIGNIFICANT CHANGE UP (ref 27.5–35.5)
APTT BLD: 30.8 SEC — SIGNIFICANT CHANGE UP (ref 27.5–35.5)
AST SERPL-CCNC: 21 U/L — SIGNIFICANT CHANGE UP (ref 10–40)
AST SERPL-CCNC: 26 U/L — SIGNIFICANT CHANGE UP (ref 10–40)
BASE EXCESS BLDA CALC-SCNC: 4.5 MMOL/L — HIGH (ref -2–3)
BILIRUB SERPL-MCNC: 0.7 MG/DL — SIGNIFICANT CHANGE UP (ref 0.2–1.2)
BILIRUB SERPL-MCNC: 0.7 MG/DL — SIGNIFICANT CHANGE UP (ref 0.2–1.2)
BLD GP AB SCN SERPL QL: NEGATIVE — SIGNIFICANT CHANGE UP
BUN SERPL-MCNC: 40 MG/DL — HIGH (ref 7–23)
BUN SERPL-MCNC: 45 MG/DL — HIGH (ref 7–23)
CALCIUM SERPL-MCNC: 8.1 MG/DL — LOW (ref 8.4–10.5)
CALCIUM SERPL-MCNC: 8.3 MG/DL — LOW (ref 8.4–10.5)
CHLORIDE SERPL-SCNC: 101 MMOL/L — SIGNIFICANT CHANGE UP (ref 96–108)
CHLORIDE SERPL-SCNC: 101 MMOL/L — SIGNIFICANT CHANGE UP (ref 96–108)
CO2 BLDA-SCNC: 30 MMOL/L — HIGH (ref 19–24)
CO2 SERPL-SCNC: 25 MMOL/L — SIGNIFICANT CHANGE UP (ref 22–31)
CO2 SERPL-SCNC: 26 MMOL/L — SIGNIFICANT CHANGE UP (ref 22–31)
CREAT SERPL-MCNC: 3.45 MG/DL — HIGH (ref 0.5–1.3)
CREAT SERPL-MCNC: 3.57 MG/DL — HIGH (ref 0.5–1.3)
EGFR: 19 ML/MIN/1.73M2 — LOW
EGFR: 20 ML/MIN/1.73M2 — LOW
GAS PNL BLDA: SIGNIFICANT CHANGE UP
GLUCOSE BLDC GLUCOMTR-MCNC: 79 MG/DL — SIGNIFICANT CHANGE UP (ref 70–99)
GLUCOSE BLDC GLUCOMTR-MCNC: 80 MG/DL — SIGNIFICANT CHANGE UP (ref 70–99)
GLUCOSE BLDC GLUCOMTR-MCNC: 83 MG/DL — SIGNIFICANT CHANGE UP (ref 70–99)
GLUCOSE BLDC GLUCOMTR-MCNC: 89 MG/DL — SIGNIFICANT CHANGE UP (ref 70–99)
GLUCOSE BLDC GLUCOMTR-MCNC: 93 MG/DL — SIGNIFICANT CHANGE UP (ref 70–99)
GLUCOSE SERPL-MCNC: 86 MG/DL — SIGNIFICANT CHANGE UP (ref 70–99)
GLUCOSE SERPL-MCNC: 98 MG/DL — SIGNIFICANT CHANGE UP (ref 70–99)
HCO3 BLDA-SCNC: 28 MMOL/L — SIGNIFICANT CHANGE UP (ref 21–28)
HCT VFR BLD CALC: 25.8 % — LOW (ref 39–50)
HCT VFR BLD CALC: 29.2 % — LOW (ref 39–50)
HGB BLD-MCNC: 8.7 G/DL — LOW (ref 13–17)
HGB BLD-MCNC: 9.9 G/DL — LOW (ref 13–17)
INR BLD: 1.22 — HIGH (ref 0.88–1.16)
INR BLD: 1.24 — HIGH (ref 0.88–1.16)
MAGNESIUM SERPL-MCNC: 2.1 MG/DL — SIGNIFICANT CHANGE UP (ref 1.6–2.6)
MAGNESIUM SERPL-MCNC: 2.3 MG/DL — SIGNIFICANT CHANGE UP (ref 1.6–2.6)
MCHC RBC-ENTMCNC: 30.4 PG — SIGNIFICANT CHANGE UP (ref 27–34)
MCHC RBC-ENTMCNC: 30.6 PG — SIGNIFICANT CHANGE UP (ref 27–34)
MCHC RBC-ENTMCNC: 33.7 GM/DL — SIGNIFICANT CHANGE UP (ref 32–36)
MCHC RBC-ENTMCNC: 33.9 GM/DL — SIGNIFICANT CHANGE UP (ref 32–36)
MCV RBC AUTO: 89.6 FL — SIGNIFICANT CHANGE UP (ref 80–100)
MCV RBC AUTO: 90.8 FL — SIGNIFICANT CHANGE UP (ref 80–100)
NRBC # BLD: 0 /100 WBCS — SIGNIFICANT CHANGE UP (ref 0–0)
NRBC # BLD: 0 /100 WBCS — SIGNIFICANT CHANGE UP (ref 0–0)
PCO2 BLDA: 39 MMHG — SIGNIFICANT CHANGE UP (ref 35–48)
PH BLDA: 7.47 — HIGH (ref 7.35–7.45)
PHOSPHATE SERPL-MCNC: 3.1 MG/DL — SIGNIFICANT CHANGE UP (ref 2.5–4.5)
PHOSPHATE SERPL-MCNC: 3.5 MG/DL — SIGNIFICANT CHANGE UP (ref 2.5–4.5)
PLATELET # BLD AUTO: 181 K/UL — SIGNIFICANT CHANGE UP (ref 150–400)
PLATELET # BLD AUTO: 206 K/UL — SIGNIFICANT CHANGE UP (ref 150–400)
PO2 BLDA: 97 MMHG — SIGNIFICANT CHANGE UP (ref 83–108)
POTASSIUM SERPL-MCNC: 3.4 MMOL/L — LOW (ref 3.5–5.3)
POTASSIUM SERPL-MCNC: 3.7 MMOL/L — SIGNIFICANT CHANGE UP (ref 3.5–5.3)
POTASSIUM SERPL-SCNC: 3.4 MMOL/L — LOW (ref 3.5–5.3)
POTASSIUM SERPL-SCNC: 3.7 MMOL/L — SIGNIFICANT CHANGE UP (ref 3.5–5.3)
PROT SERPL-MCNC: 5.2 G/DL — LOW (ref 6–8.3)
PROT SERPL-MCNC: 5.6 G/DL — LOW (ref 6–8.3)
PROTHROM AB SERPL-ACNC: 14.6 SEC — HIGH (ref 10.5–13.4)
PROTHROM AB SERPL-ACNC: 14.8 SEC — HIGH (ref 10.5–13.4)
RBC # BLD: 2.84 M/UL — LOW (ref 4.2–5.8)
RBC # BLD: 3.26 M/UL — LOW (ref 4.2–5.8)
RBC # FLD: 14.8 % — HIGH (ref 10.3–14.5)
RBC # FLD: 14.8 % — HIGH (ref 10.3–14.5)
RH IG SCN BLD-IMP: POSITIVE — SIGNIFICANT CHANGE UP
SAO2 % BLDA: 99.3 % — HIGH (ref 94–98)
SODIUM SERPL-SCNC: 137 MMOL/L — SIGNIFICANT CHANGE UP (ref 135–145)
SODIUM SERPL-SCNC: 137 MMOL/L — SIGNIFICANT CHANGE UP (ref 135–145)
WBC # BLD: 19 K/UL — HIGH (ref 3.8–10.5)
WBC # BLD: 19.62 K/UL — HIGH (ref 3.8–10.5)
WBC # FLD AUTO: 19 K/UL — HIGH (ref 3.8–10.5)
WBC # FLD AUTO: 19.62 K/UL — HIGH (ref 3.8–10.5)

## 2023-04-03 PROCEDURE — 99291 CRITICAL CARE FIRST HOUR: CPT

## 2023-04-03 RX ORDER — BUMETANIDE 0.25 MG/ML
2 INJECTION INTRAMUSCULAR; INTRAVENOUS ONCE
Refills: 0 | Status: COMPLETED | OUTPATIENT
Start: 2023-04-03 | End: 2023-04-03

## 2023-04-03 RX ORDER — QUETIAPINE FUMARATE 200 MG/1
25 TABLET, FILM COATED ORAL AT BEDTIME
Refills: 0 | Status: DISCONTINUED | OUTPATIENT
Start: 2023-04-03 | End: 2023-04-03

## 2023-04-03 RX ORDER — LANOLIN ALCOHOL/MO/W.PET/CERES
5 CREAM (GRAM) TOPICAL AT BEDTIME
Refills: 0 | Status: DISCONTINUED | OUTPATIENT
Start: 2023-04-03 | End: 2023-04-10

## 2023-04-03 RX ORDER — BUMETANIDE 0.25 MG/ML
2 INJECTION INTRAMUSCULAR; INTRAVENOUS DAILY
Refills: 0 | Status: DISCONTINUED | OUTPATIENT
Start: 2023-04-03 | End: 2023-04-03

## 2023-04-03 RX ORDER — POTASSIUM CHLORIDE 20 MEQ
20 PACKET (EA) ORAL
Refills: 0 | Status: COMPLETED | OUTPATIENT
Start: 2023-04-03 | End: 2023-04-03

## 2023-04-03 RX ORDER — LIDOCAINE 4 G/100G
1 CREAM TOPICAL EVERY 24 HOURS
Refills: 0 | Status: DISCONTINUED | OUTPATIENT
Start: 2023-04-03 | End: 2023-04-10

## 2023-04-03 RX ORDER — ALPRAZOLAM 0.25 MG
0.25 TABLET ORAL ONCE
Refills: 0 | Status: DISCONTINUED | OUTPATIENT
Start: 2023-04-03 | End: 2023-04-04

## 2023-04-03 RX ORDER — POTASSIUM CHLORIDE 20 MEQ
20 PACKET (EA) ORAL ONCE
Refills: 0 | Status: COMPLETED | OUTPATIENT
Start: 2023-04-03 | End: 2023-04-03

## 2023-04-03 RX ADMIN — Medication 81 MILLIGRAM(S): at 11:23

## 2023-04-03 RX ADMIN — Medication 4 MILLIGRAM(S): at 11:23

## 2023-04-03 RX ADMIN — OXYCODONE HYDROCHLORIDE 5 MILLIGRAM(S): 5 TABLET ORAL at 21:30

## 2023-04-03 RX ADMIN — Medication 4 MILLIGRAM(S): at 18:29

## 2023-04-03 RX ADMIN — Medication 50 MILLIEQUIVALENT(S): at 05:38

## 2023-04-03 RX ADMIN — CHLORHEXIDINE GLUCONATE 1 APPLICATION(S): 213 SOLUTION TOPICAL at 05:39

## 2023-04-03 RX ADMIN — Medication 5 MILLIGRAM(S): at 22:31

## 2023-04-03 RX ADMIN — HEPARIN SODIUM 5000 UNIT(S): 5000 INJECTION INTRAVENOUS; SUBCUTANEOUS at 05:41

## 2023-04-03 RX ADMIN — BUMETANIDE 2 MILLIGRAM(S): 0.25 INJECTION INTRAMUSCULAR; INTRAVENOUS at 11:37

## 2023-04-03 RX ADMIN — HEPARIN SODIUM 5000 UNIT(S): 5000 INJECTION INTRAVENOUS; SUBCUTANEOUS at 15:11

## 2023-04-03 RX ADMIN — Medication 4 MILLIGRAM(S): at 10:32

## 2023-04-03 RX ADMIN — Medication 20 MILLIEQUIVALENT(S): at 16:22

## 2023-04-03 RX ADMIN — PANTOPRAZOLE SODIUM 40 MILLIGRAM(S): 20 TABLET, DELAYED RELEASE ORAL at 11:24

## 2023-04-03 RX ADMIN — Medication 57.5 MILLIGRAM(S): at 07:42

## 2023-04-03 RX ADMIN — Medication 25 MILLIGRAM(S): at 17:26

## 2023-04-03 RX ADMIN — MODAFINIL 200 MILLIGRAM(S): 200 TABLET ORAL at 11:24

## 2023-04-03 RX ADMIN — BUMETANIDE 2 MILLIGRAM(S): 0.25 INJECTION INTRAMUSCULAR; INTRAVENOUS at 20:09

## 2023-04-03 RX ADMIN — LIDOCAINE 1 PATCH: 4 CREAM TOPICAL at 15:12

## 2023-04-03 RX ADMIN — LACOSAMIDE 120 MILLIGRAM(S): 50 TABLET ORAL at 22:31

## 2023-04-03 RX ADMIN — LIDOCAINE 1 PATCH: 4 CREAM TOPICAL at 18:29

## 2023-04-03 RX ADMIN — OXYCODONE HYDROCHLORIDE 5 MILLIGRAM(S): 5 TABLET ORAL at 15:19

## 2023-04-03 RX ADMIN — OXYCODONE HYDROCHLORIDE 5 MILLIGRAM(S): 5 TABLET ORAL at 14:28

## 2023-04-03 RX ADMIN — Medication 25 MILLIGRAM(S): at 05:41

## 2023-04-03 RX ADMIN — Medication 50 MILLIEQUIVALENT(S): at 07:59

## 2023-04-03 RX ADMIN — Medication 57.5 MILLIGRAM(S): at 18:33

## 2023-04-03 RX ADMIN — OXYCODONE HYDROCHLORIDE 5 MILLIGRAM(S): 5 TABLET ORAL at 20:37

## 2023-04-03 RX ADMIN — LACOSAMIDE 120 MILLIGRAM(S): 50 TABLET ORAL at 11:24

## 2023-04-03 RX ADMIN — HEPARIN SODIUM 5000 UNIT(S): 5000 INJECTION INTRAVENOUS; SUBCUTANEOUS at 22:31

## 2023-04-03 RX ADMIN — Medication 50 MILLIEQUIVALENT(S): at 07:41

## 2023-04-03 NOTE — PROGRESS NOTE ADULT - ASSESSMENT
52 year old male, current every day marijuana use w/pmh of HTN, type A aortic dissection s/p Dacron grafts, AV resuspension in 2013,CAD s/p CABG x 1 SVG to RCA (5/2013 with Dr. Medina), seizure disorder (last episode on 7/4/22) presents for a follow up visit for evaluation and management of his aortic pathology and deemed a candidate for a reop total arch replacement given aortic aneurysm and chronic dissection.  S/p AVR/ Arch replacement/ aorto-axillary bypass- CABG x 1 and TEVAR   3/20  Postop course with worsening renal failure and sig acidosis started on CVVHD  Afib 3/23  Extubated 03/29  completed 7 day course of zosyn for serratia VAP last day 4/2   A/p   - MS and neuro exam intact- continue AEDs per epilepsy recs   on daily modafinil 200 mg   - postop afib converted to sinus- IV and PO amio loaded not on maintenance amio- continue lopressor 25 mg BID   Titrating according to MAPs > 70 for adequate renal perfusion   - Renal failure most likely iATN given long operation and clamp time/ required CVVHD for acidosis though non oliguric renal injury-->off dialysis   intermittently requires IHD and Aqua, HD cath removed 4/2  No need for urgent dialytic interventions autodiuresing UOP ~ 800 with Aqau  Clinically euvolemic  CVP 4-6- Will consider PRN diuresis to maintain neutral FB   H&H drifting given weakness and mild orthostasis will transfuse a unit blood for HCT 25% followed by Bumex   to replete lytes accordingly for goal k>4 and mg>2   Continue ICU prophylactic measures-IS   Optimize nutrition and ambulation    LIJ HD cath Dc'd  4/2       ATTENDING: I have personally and independently provided 90 Min of critical care services. this excludes time spent on sperate procedures or teaching.

## 2023-04-03 NOTE — PROGRESS NOTE ADULT - ASSESSMENT
52 yo M with non-oliguric iATN i/s/o replacement of aortic arch and TAVR on 3/20- prolonged OR time and massive transfusion and other blood products. Initiated on CVVHD (3/21-3/24) for clearance and volume. 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 [id number]  Showing some ATN recovery, however UO not adequate for his present needs  CVVHD (3/21- 3/24)  AQ (3/31-4/2)    Plan:  Volume status and electrolytes stable, will defer HD or aqua for today  strict I&Os  BMP per icu protocol  Maintain MAP >70 for renal perfusion     #Anemia  -Check iron profile and ferritin    Discussed w/ CTICU team

## 2023-04-03 NOTE — PROGRESS NOTE ADULT - SUBJECTIVE AND OBJECTIVE BOX
--------------------------------------------------------------------------------  Chief Complaint: ESRD/Ongoing hemodialysis requirement    24 hour events/subjective:    Seen this morning, remains stable. No acute Hd needs    PAST HISTORY  --------------------------------------------------------------------------------  No significant changes to PMH, PSH, FHx, SHx, unless otherwise noted    ALLERGIES & MEDICATIONS  --------------------------------------------------------------------------------  Allergies    No Known Allergies    Intolerances      Standing Inpatient Medications  ALPRAZolam 0.25 milliGRAM(s) Oral once  aspirin  chewable 81 milliGRAM(s) Oral daily  chlorhexidine 2% Cloths 1 Application(s) Topical daily  dextrose 10%. 250 milliLiter(s) IV Continuous <Continuous>  dextrose 5%. 1000 milliLiter(s) IV Continuous <Continuous>  dextrose 5%. 1000 milliLiter(s) IV Continuous <Continuous>  dextrose 50% Injectable 50 milliLiter(s) IV Push every 15 minutes  dextrose 50% Injectable 25 milliLiter(s) IV Push every 15 minutes  glucagon  Injectable 1 milliGRAM(s) IntraMuscular once  heparin   Injectable 5000 Unit(s) SubCutaneous every 8 hours  insulin lispro (ADMELOG) corrective regimen sliding scale   SubCutaneous every 6 hours  lacosamide IVPB 100 milliGRAM(s) IV Intermittent every 12 hours  lidocaine 2% Jelly 10 milliLiter(s) IntraUrethral once  metoprolol tartrate 25 milliGRAM(s) Oral two times a day  multivitamin/minerals/iron Oral Solution (CENTRUM) 15 milliLiter(s) Oral daily  pantoprazole  Injectable 40 milliGRAM(s) IV Push daily  QUEtiapine 25 milliGRAM(s) Oral at bedtime  sodium chloride 0.9%. 1000 milliLiter(s) IV Continuous <Continuous>  valproate sodium  IVPB 750 milliGRAM(s) IV Intermittent every 12 hours    PRN Inpatient Medications  dextrose Oral Gel 15 Gram(s) Oral once PRN  oxyCODONE    IR 5 milliGRAM(s) Oral every 6 hours PRN      REVIEW OF SYSTEMS  --------------------------------------------------------------------------------  All other systems were reviewed and are negative, except as noted.    VITALS/PHYSICAL EXAM  --------------------------------------------------------------------------------  T(C): 37.7 (04-03-23 @ 09:20), Max: 37.7 (04-02-23 @ 13:43)  HR: 102 (04-03-23 @ 12:00) (94 - 117)  BP: --  RR: 16 (04-03-23 @ 13:00) (12 - 20)  SpO2: 98% (04-03-23 @ 12:00) (92% - 100%)  Wt(kg): --  Drug Dosing Weight  Height (cm): 182.9 (20 Mar 2023 07:23)  Weight (kg): 77.1 (20 Mar 2023 07:23)  BMI (kg/m2): 23 (20 Mar 2023 07:23)  BSA (m2): 1.99 (20 Mar 2023 07:23)        04-02-23 @ 07:01  -  04-03-23 @ 07:00  --------------------------------------------------------  IN: 916 mL / OUT: 1563 mL / NET: -647 mL    04-03-23 @ 07:01  -  04-03-23 @ 12:51  --------------------------------------------------------  IN: 2 mL / OUT: 318 mL / NET: -316 mL    PHYSICAL EXAM:  GENERAL: NAD, laying in bed   NECK: supple, No JVD  CHEST/LUNG: CTLA B/L, on NC   HEART: normal S1S2, RRR  ABDOMEN: Soft, Nontender, +BS, No flank tenderness bilateral  EXTREMITIES: No clubbing, cyanosis, or edema   NEUROLOGY: AAO x3, no focal neurological deficit  ACCESS: Rt IJ S/Q HD Cath c/d/i      LABS/STUDIES  --------------------------------------------------------------------------------              8.7    19.62 >-----------<  181      [04-03-23 @ 02:17]              25.8     137  |  101  |  40  ----------------------------<  86      [04-03-23 @ 02:17]  3.4   |  26  |  3.45        Ca     8.3     [04-03-23 @ 02:17]      Mg     2.1     [04-03-23 @ 02:17]      Phos  3.5     [04-03-23 @ 02:17]    TPro  5.2  /  Alb  2.4  /  TBili  0.7  /  DBili  x   /  AST  21  /  ALT  10  /  AlkPhos  77  [04-03-23 @ 02:17]    PT/INR: PT 14.8 , INR 1.24       [04-03-23 @ 02:17]  PTT: 30.8       [04-03-23 @ 02:17]      TSH 0.626      [03-09-23 @ 12:54]  Lipid: chol 130, TG 85, HDL 43, LDL --      [03-09-23 @ 10:04]    HBsAb Nonreact      [03-30-23 @ 12:02]  HBsAg Nonreact      [03-09-23 @ 12:54]  HCV 0.29, Nonreact      [03-30-23 @ 12:02]      RADIOLOGY:  -------------------------------------------------------------------------------------- --------------------------------------------------------------------------------  Chief Complaint: ESRD/Ongoing hemodialysis requirement    24 hour events/subjective:    Seen this morning, remains stable. No acute Hd needs    PAST HISTORY  --------------------------------------------------------------------------------  No significant changes to PMH, PSH, FHx, SHx, unless otherwise noted    ALLERGIES & MEDICATIONS  --------------------------------------------------------------------------------  Allergies    No Known Allergies    Intolerances      Standing Inpatient Medications  ALPRAZolam 0.25 milliGRAM(s) Oral once  aspirin  chewable 81 milliGRAM(s) Oral daily  chlorhexidine 2% Cloths 1 Application(s) Topical daily  dextrose 10%. 250 milliLiter(s) IV Continuous <Continuous>  dextrose 5%. 1000 milliLiter(s) IV Continuous <Continuous>  dextrose 5%. 1000 milliLiter(s) IV Continuous <Continuous>  dextrose 50% Injectable 50 milliLiter(s) IV Push every 15 minutes  dextrose 50% Injectable 25 milliLiter(s) IV Push every 15 minutes  glucagon  Injectable 1 milliGRAM(s) IntraMuscular once  heparin   Injectable 5000 Unit(s) SubCutaneous every 8 hours  insulin lispro (ADMELOG) corrective regimen sliding scale   SubCutaneous every 6 hours  lacosamide IVPB 100 milliGRAM(s) IV Intermittent every 12 hours  lidocaine 2% Jelly 10 milliLiter(s) IntraUrethral once  metoprolol tartrate 25 milliGRAM(s) Oral two times a day  multivitamin/minerals/iron Oral Solution (CENTRUM) 15 milliLiter(s) Oral daily  pantoprazole  Injectable 40 milliGRAM(s) IV Push daily  QUEtiapine 25 milliGRAM(s) Oral at bedtime  sodium chloride 0.9%. 1000 milliLiter(s) IV Continuous <Continuous>  valproate sodium  IVPB 750 milliGRAM(s) IV Intermittent every 12 hours    PRN Inpatient Medications  dextrose Oral Gel 15 Gram(s) Oral once PRN  oxyCODONE    IR 5 milliGRAM(s) Oral every 6 hours PRN      REVIEW OF SYSTEMS  --------------------------------------------------------------------------------  All other systems were reviewed and are negative, except as noted.    VITALS/PHYSICAL EXAM  --------------------------------------------------------------------------------  T(C): 37.7 (04-03-23 @ 09:20), Max: 37.7 (04-02-23 @ 13:43)  HR: 102 (04-03-23 @ 12:00) (94 - 117)  BP: --  RR: 16 (04-03-23 @ 13:00) (12 - 20)  SpO2: 98% (04-03-23 @ 12:00) (92% - 100%)  Wt(kg): --  Drug Dosing Weight  Height (cm): 182.9 (20 Mar 2023 07:23)  Weight (kg): 77.1 (20 Mar 2023 07:23)  BMI (kg/m2): 23 (20 Mar 2023 07:23)  BSA (m2): 1.99 (20 Mar 2023 07:23)        04-02-23 @ 07:01  -  04-03-23 @ 07:00  --------------------------------------------------------  IN: 916 mL / OUT: 1563 mL / NET: -647 mL    04-03-23 @ 07:01  -  04-03-23 @ 12:51  --------------------------------------------------------  IN: 2 mL / OUT: 318 mL / NET: -316 mL    PHYSICAL EXAM:  GENERAL: NAD, laying in bed   NECK: supple, No JVD  CHEST/LUNG: CTLA B/L, on NC   HEART: normal S1S2, RRR  ABDOMEN: Soft, Nontender, +BS, No flank tenderness bilateral  EXTREMITIES: No clubbing, cyanosis, or edema   NEUROLOGY: AAO x3, no focal neurological deficit  ACCESS: Rt IJ S/Q HD Cath c/d/i      LABS/STUDIES  --------------------------------------------------------------------------------              8.7    19.62 >-----------<  181      [04-03-23 @ 02:17]              25.8     137  |  101  |  40  ----------------------------<  86      [04-03-23 @ 02:17]  3.4   |  26  |  3.45        Ca     8.3     [04-03-23 @ 02:17]      Mg     2.1     [04-03-23 @ 02:17]      Phos  3.5     [04-03-23 @ 02:17]    TPro  5.2  /  Alb  2.4  /  TBili  0.7  /  DBili  x   /  AST  21  /  ALT  10  /  AlkPhos  77  [04-03-23 @ 02:17]    PT/INR: PT 14.8 , INR 1.24       [04-03-23 @ 02:17]  PTT: 30.8       [04-03-23 @ 02:17]      TSH 0.626      [03-09-23 @ 12:54]  Lipid: chol 130, TG 85, HDL 43, LDL --      [03-09-23 @ 10:04]    HBsAb Nonreact      [03-30-23 @ 12:02]  HBsAg Nonreact      [03-09-23 @ 12:54]  HCV 0.29, Nonreact      [03-30-23 @ 12:02]      RADIOLOGY:  --------------------------------------------------------------------------------------    Creatinine, Serum: 3.57 mg/dL (04-03-23 @ 14:47)  Creatinine, Serum: 3.45 mg/dL (04-03-23 @ 02:17)  Creatinine, Serum: 2.95 mg/dL (04-02-23 @ 13:31)  Creatinine, Serum: 2.67 mg/dL (04-02-23 @ 02:27)  Creatinine, Serum: 3.78 mg/dL (04-01-23 @ 09:55)  Creatinine, Serum: 3.53 mg/dL (04-01-23 @ 03:21)  Creatinine, Serum: 3.44 mg/dL (03-31-23 @ 23:09)  Creatinine, Serum: 2.92 mg/dL (03-31-23 @ 10:02)  Creatinine, Serum: 2.33 mg/dL (03-31-23 @ 00:19)  Creatinine, Serum: 3.25 mg/dL (03-30-23 @ 13:03)

## 2023-04-03 NOTE — PROGRESS NOTE ADULT - SUBJECTIVE AND OBJECTIVE BOX
INTERVAL COURSE  HDS/ Sinus tach in low 100s, BPs in good range   OOB in the chair, feels tired and weak wants to gent back to bed   Labs in good range H&H drifting slowly though HCT 25%  UOP ~ 30CC/hr  Cr in mid 3s/ HD cath removed 4/2  BS in good range- Neuro exam intact     VITALS  Vital Signs Last 24 Hrs  T(C): 37.7 (03 Apr 2023 09:20), Max: 37.7 (02 Apr 2023 13:43)  T(F): 99.9 (03 Apr 2023 09:20), Max: 99.9 (03 Apr 2023 09:20)  HR: 104 (03 Apr 2023 09:00) (94 - 117)  BP: 126/84  BP(mean): 100  RR: 18 (03 Apr 2023 10:00) (12 - 20)  SpO2: 99% (03 Apr 2023 09:00) (92% - 100%)    Parameters below as of 03 Apr 2023 10:00  Patient On (Oxygen Delivery Method): nasal cannula w/ humidification  O2 Flow (L/min): 3      I&O's Summary    02 Apr 2023 07:01  -  03 Apr 2023 07:00  --------------------------------------------------------  IN: 916 mL / OUT: 1563 mL / NET: -647 mL    03 Apr 2023 07:01  -  03 Apr 2023 10:13  --------------------------------------------------------  IN: 2 mL / OUT: 123 mL / NET: -121 mL      PHYSICAL EXAM  General: A&Ox 3; NAD  Respiratory: CTA B/L; no wheezes/crackles/rales   Cardiovascular: Regular tachycardia   Gastrointestinal: Soft; NTND   Extremities: Warm, mild anasarca   Neurological:  CNII-XII grossly intact; no obvious focal deficits    MEDICATIONS  (STANDING):  aspirin  chewable 81 milliGRAM(s) Oral daily  buMETAnide Injectable 2 milliGRAM(s) IV Push daily  chlorhexidine 2% Cloths 1 Application(s) Topical daily  dextrose 10%. 250 milliLiter(s) (250 mL/Hr) IV Continuous <Continuous>  dextrose 5%. 1000 milliLiter(s) (50 mL/Hr) IV Continuous <Continuous>  dextrose 5%. 1000 milliLiter(s) (100 mL/Hr) IV Continuous <Continuous>  dextrose 50% Injectable 50 milliLiter(s) IV Push every 15 minutes  dextrose 50% Injectable 25 milliLiter(s) IV Push every 15 minutes  glucagon  Injectable 1 milliGRAM(s) IntraMuscular once  heparin   Injectable 5000 Unit(s) SubCutaneous every 8 hours  insulin lispro (ADMELOG) corrective regimen sliding scale   SubCutaneous every 6 hours  lacosamide IVPB 100 milliGRAM(s) IV Intermittent every 12 hours  lidocaine 2% Jelly 10 milliLiter(s) IntraUrethral once  metoprolol tartrate 25 milliGRAM(s) Oral two times a day  modafinil 200 milliGRAM(s) Oral daily  multivitamin/minerals/iron Oral Solution (CENTRUM) 15 milliLiter(s) Oral daily  pantoprazole  Injectable 40 milliGRAM(s) IV Push daily  sodium chloride 0.9%. 1000 milliLiter(s) (10 mL/Hr) IV Continuous <Continuous>  testosterone patch 4 mG/24 Hr(s) 4 milliGRAM(s) Transdermal daily  valproate sodium  IVPB 750 milliGRAM(s) IV Intermittent every 12 hours    MEDICATIONS  (PRN):  dextrose Oral Gel 15 Gram(s) Oral once PRN Blood Glucose LESS THAN 70 milliGRAM(s)/deciliter  oxyCODONE    IR 5 milliGRAM(s) Oral every 6 hours PRN Moderate Pain (4 - 6)  oxyCODONE    IR 10 milliGRAM(s) Oral every 6 hours PRN Severe Pain (7 - 10)      LABS                        8.7    19.62 )-----------( 181      ( 03 Apr 2023 02:17 )             25.8     04-03    137  |  101  |  40<H>  ----------------------------<  86  3.4<L>   |  26  |  3.45<H>    Ca    8.3<L>      03 Apr 2023 02:17  Phos  3.5     04-03  Mg     2.1     04-03    TPro  5.2<L>  /  Alb  2.4<L>  /  TBili  0.7  /  DBili  x   /  AST  21  /  ALT  10  /  AlkPhos  77  04-03    LIVER FUNCTIONS - ( 03 Apr 2023 02:17 )  Alb: 2.4 g/dL / Pro: 5.2 g/dL / ALK PHOS: 77 U/L / ALT: 10 U/L / AST: 21 U/L / GGT: x           PT/INR - ( 03 Apr 2023 02:17 )   PT: 14.8 sec;   INR: 1.24          PTT - ( 03 Apr 2023 02:17 )  PTT:30.8 sec    IMAGING/EKG/ETC  Reviewed.

## 2023-04-03 NOTE — CHART NOTE - NSCHARTNOTEFT_GEN_A_CORE
1 pericardial bella and 1 left pleural bella (from OR) removed without incidence. Tie downs secured in place. Occlusive dressing applied. CXR stable with no pneumothorax. Patient tolerated procedure well.

## 2023-04-03 NOTE — PROGRESS NOTE ADULT - SUBJECTIVE AND OBJECTIVE BOX
CTICU  CRITICAL  CARE  attending     Hand off received 					   Pertinent clinical, laboratory, radiographic, hemodynamic, echocardiographic, respiratory data, microbiologic data and chart were reviewed and analyzed frequently throughout the course of the day and night      52 year old male with HTN, type A aortic dissection s/p Dacron grafts, AV resuspension in 2013,CAD s/p CABG x 1 SVG to RCA (5/2013 with Dr. Medina), seizure disorder (last episode on 7/4/22).  He is a current every day marijuana user  CTA chest, abdomen and pelvis 11/30/22: Status post graft repair of the ascending aorta and portion of aortic arch.  Dissecting aneurysm of the descending thoracic aorta (measuring up to 5.7 cm) and abdominal aorta (3.5 cm infrarenal), similar to the MR angiogram of 9/19/2022.  Nonocclusive dissection flap present within the innominate artery, aneurysmal right subclavian artery and proximal right common carotid artery as well as aneurysmal left common iliac artery.    He was referred by Dr. Jacqueline Gonsales.  He ws screened for Triomphe study but did not qualify.    S/P AVR  Aortic arch replacement  S/P CABG (SVG to RCA).  S/P Aorto left axillary by pass.  S/P TEVAR to descending aorta.        FAMILY HISTORY:  No pertinent family history in first degree relatives    PAST MEDICAL & SURGICAL HISTORY:  HTN (hypertension)  Aortic dissection  CAD (coronary artery disease)  Seizure disorder  S/P aortic bifurcation bypass graft  S/P CABG x 1      14 system review was unremarkable    Vital signs, hemodynamic and respiratory parameters were reviewed from the bedside nursing flow sheet.  ICU Vital Signs Last 24 Hrs  T(C): 36.6 (03 Apr 2023 22:21), Max: 37.7 (03 Apr 2023 09:20)  T(F): 97.8 (03 Apr 2023 22:21), Max: 99.9 (03 Apr 2023 09:20)  HR: 93 (03 Apr 2023 22:00) (93 - 114)  BP: 148/92 (03 Apr 2023 22:00) (129/79 - 148/92)  BP(mean): 115 (03 Apr 2023 22:00) (97 - 115)  ABP: 168/82 (03 Apr 2023 15:00) (103/73 - 168/82)  ABP(mean): 105 (03 Apr 2023 15:00) (85 - 106)  RR: 16 (03 Apr 2023 22:00) (12 - 20)  SpO2: 97% (03 Apr 2023 22:00) (95% - 100%)    O2 Parameters below as of 03 Apr 2023 23:00  Patient On (Oxygen Delivery Method): room air          Adult Advanced Hemodynamics Last 24 Hrs  CVP(mm Hg): 18 (03 Apr 2023 22:00) (4 - 50)  CVP(cm H2O): --  CO: --  CI: --  PA: --  PA(mean): --  PCWP: --  SVR: --  SVRI: --  PVR: --  PVRI: --, ABG - ( 03 Apr 2023 14:49 )  pH, Arterial: 7.48  pH, Blood: x     /  pCO2: 32    /  pO2: 79    / HCO3: 24    / Base Excess: 0.9   /  SaO2: 97.7                Intake and output was reviewed and the fluid balance was calculated  Daily     Daily   I&O's Summary    02 Apr 2023 07:01  -  03 Apr 2023 07:00  --------------------------------------------------------  IN: 916 mL / OUT: 1563 mL / NET: -647 mL    03 Apr 2023 07:01  -  03 Apr 2023 23:01  --------------------------------------------------------  IN: 1102 mL / OUT: 1420 mL / NET: -318 mL        All lines and drain sites were assessed    Neuro: No focal motor deficit.  Neck: No JVD.  CVS: S1, S2, No S3.  Lungs: Good air entry bilaterally.  Abd: Soft. No tenderness. + Bowel sounds.  Vascular: + DP/PT.  Extremities: No edema.  Lymphatic: Normal.  Skin: No abnormalities.      labs  CBC Full  -  ( 03 Apr 2023 14:47 )  WBC Count : 19.00 K/uL  RBC Count : 3.26 M/uL  Hemoglobin : 9.9 g/dL  Hematocrit : 29.2 %  Platelet Count - Automated : 206 K/uL  Mean Cell Volume : 89.6 fl  Mean Cell Hemoglobin : 30.4 pg  Mean Cell Hemoglobin Concentration : 33.9 gm/dL  Auto Neutrophil # : x  Auto Lymphocyte # : x  Auto Monocyte # : x  Auto Eosinophil # : x  Auto Basophil # : x  Auto Neutrophil % : x  Auto Lymphocyte % : x  Auto Monocyte % : x  Auto Eosinophil % : x  Auto Basophil % : x    04-03    137  |  101  |  45<H>  ----------------------------<  98  3.7   |  25  |  3.57<H>    Ca    8.1<L>      03 Apr 2023 14:47  Phos  3.1     04-03  Mg     2.3     04-03    TPro  5.6<L>  /  Alb  2.5<L>  /  TBili  0.7  /  DBili  x   /  AST  26  /  ALT  10  /  AlkPhos  81  04-03    PT/INR - ( 03 Apr 2023 14:47 )   PT: 14.6 sec;   INR: 1.22          PTT - ( 03 Apr 2023 14:47 )  PTT:29.7 sec  The current medications were reviewed   MEDICATIONS  (STANDING):  ALPRAZolam 0.25 milliGRAM(s) Oral once  aspirin  chewable 81 milliGRAM(s) Oral daily  chlorhexidine 2% Cloths 1 Application(s) Topical daily  dextrose 10%. 250 milliLiter(s) (250 mL/Hr) IV Continuous <Continuous>  dextrose 5%. 1000 milliLiter(s) (100 mL/Hr) IV Continuous <Continuous>  dextrose 5%. 1000 milliLiter(s) (50 mL/Hr) IV Continuous <Continuous>  dextrose 50% Injectable 50 milliLiter(s) IV Push every 15 minutes  dextrose 50% Injectable 25 milliLiter(s) IV Push every 15 minutes  glucagon  Injectable 1 milliGRAM(s) IntraMuscular once  heparin   Injectable 5000 Unit(s) SubCutaneous every 8 hours  insulin lispro (ADMELOG) corrective regimen sliding scale   SubCutaneous every 6 hours  lacosamide IVPB 100 milliGRAM(s) IV Intermittent every 12 hours  lidocaine   4% Patch 1 Patch Transdermal every 24 hours  lidocaine 2% Jelly 10 milliLiter(s) IntraUrethral once  melatonin 5 milliGRAM(s) Oral at bedtime  metoprolol tartrate 25 milliGRAM(s) Oral two times a day  multivitamin/minerals/iron Oral Solution (CENTRUM) 15 milliLiter(s) Oral daily  pantoprazole  Injectable 40 milliGRAM(s) IV Push daily  sodium chloride 0.9%. 1000 milliLiter(s) (10 mL/Hr) IV Continuous <Continuous>  valproate sodium  IVPB 750 milliGRAM(s) IV Intermittent every 12 hours    MEDICATIONS  (PRN):  dextrose Oral Gel 15 Gram(s) Oral once PRN Blood Glucose LESS THAN 70 milliGRAM(s)/deciliter  oxyCODONE    IR 5 milliGRAM(s) Oral every 6 hours PRN Moderate Pain (4 - 6)            53 year old  Male with expanding aortic arch and descending aortic aneurysm   S/P AVR  Aortic arch replacement  S/P CABG (SVG to RCA).  S/P Aorto left axillary by pass.  S/P TEVAR to descending aorta.  Long postoperative course complicated by vent dependent respiratory failure, renal failure, thrombocytopenia.  Good clinical recovery observed after extubation.  Hemodynamically stable.  Fair oxygenation.  Diuresing with bumetanide.          My plan includes :  Statin and Betablocker.  Close hemodynamic monitoring   Monitor for arrhythmias and monitor parameters for organ perfusion  Monitor neurologic status  Monitor renal function.  Head of the bed should remain elevated to 45 deg .   Chest PT and IS will be encouraged  Monitor adequacy of oxygenation.  Nutritional goals will be met using po eventually , ensure adequate caloric intake and monitor the same  Stress ulcer and VTE prophylaxis will be achieved    Glycemic control is satisfactory  Electrolytes have been repleted as necessary and wound care has been carried out. Pain control has been achieved.   Aggressive physical therapy and early mobility and ambulation goals will be met   The family was updated about the course and plan  CRITICAL CARE TIME SPENT in evaluation and management, reassessments, review and interpretation of labs and x-rays, coordinating care: ___30____ MIN.  Time does not include procedural time.  CTICU ATTENDING     					    José Miguel Torres MD

## 2023-04-04 LAB
ALBUMIN SERPL ELPH-MCNC: 2.4 G/DL — LOW (ref 3.3–5)
ALP SERPL-CCNC: 81 U/L — SIGNIFICANT CHANGE UP (ref 40–120)
ALT FLD-CCNC: 10 U/L — SIGNIFICANT CHANGE UP (ref 10–45)
ANION GAP SERPL CALC-SCNC: 12 MMOL/L — SIGNIFICANT CHANGE UP (ref 5–17)
ANISOCYTOSIS BLD QL: SLIGHT — SIGNIFICANT CHANGE UP
APTT BLD: 30.7 SEC — SIGNIFICANT CHANGE UP (ref 27.5–35.5)
AST SERPL-CCNC: 21 U/L — SIGNIFICANT CHANGE UP (ref 10–40)
BASOPHILS # BLD AUTO: 0 K/UL — SIGNIFICANT CHANGE UP (ref 0–0.2)
BASOPHILS NFR BLD AUTO: 0 % — SIGNIFICANT CHANGE UP (ref 0–2)
BILIRUB SERPL-MCNC: 0.6 MG/DL — SIGNIFICANT CHANGE UP (ref 0.2–1.2)
BUN SERPL-MCNC: 44 MG/DL — HIGH (ref 7–23)
BURR CELLS BLD QL SMEAR: PRESENT — SIGNIFICANT CHANGE UP
CALCIUM SERPL-MCNC: 8.6 MG/DL — SIGNIFICANT CHANGE UP (ref 8.4–10.5)
CHLORIDE SERPL-SCNC: 102 MMOL/L — SIGNIFICANT CHANGE UP (ref 96–108)
CO2 SERPL-SCNC: 25 MMOL/L — SIGNIFICANT CHANGE UP (ref 22–31)
CREAT SERPL-MCNC: 3.73 MG/DL — HIGH (ref 0.5–1.3)
EGFR: 18 ML/MIN/1.73M2 — LOW
EOSINOPHIL # BLD AUTO: 0.19 K/UL — SIGNIFICANT CHANGE UP (ref 0–0.5)
EOSINOPHIL NFR BLD AUTO: 0.9 % — SIGNIFICANT CHANGE UP (ref 0–6)
GLUCOSE BLDC GLUCOMTR-MCNC: 106 MG/DL — HIGH (ref 70–99)
GLUCOSE BLDC GLUCOMTR-MCNC: 72 MG/DL — SIGNIFICANT CHANGE UP (ref 70–99)
GLUCOSE BLDC GLUCOMTR-MCNC: 76 MG/DL — SIGNIFICANT CHANGE UP (ref 70–99)
GLUCOSE BLDC GLUCOMTR-MCNC: 82 MG/DL — SIGNIFICANT CHANGE UP (ref 70–99)
GLUCOSE BLDC GLUCOMTR-MCNC: 90 MG/DL — SIGNIFICANT CHANGE UP (ref 70–99)
GLUCOSE BLDC GLUCOMTR-MCNC: 92 MG/DL — SIGNIFICANT CHANGE UP (ref 70–99)
GLUCOSE SERPL-MCNC: 82 MG/DL — SIGNIFICANT CHANGE UP (ref 70–99)
HCT VFR BLD CALC: 29.2 % — LOW (ref 39–50)
HGB BLD-MCNC: 9.8 G/DL — LOW (ref 13–17)
INR BLD: 1.22 — HIGH (ref 0.88–1.16)
LYMPHOCYTES # BLD AUTO: 0.95 K/UL — LOW (ref 1–3.3)
LYMPHOCYTES # BLD AUTO: 4.4 % — LOW (ref 13–44)
MACROCYTES BLD QL: SLIGHT — SIGNIFICANT CHANGE UP
MAGNESIUM SERPL-MCNC: 1.9 MG/DL — SIGNIFICANT CHANGE UP (ref 1.6–2.6)
MANUAL SMEAR VERIFICATION: SIGNIFICANT CHANGE UP
MCHC RBC-ENTMCNC: 30.9 PG — SIGNIFICANT CHANGE UP (ref 27–34)
MCHC RBC-ENTMCNC: 33.6 GM/DL — SIGNIFICANT CHANGE UP (ref 32–36)
MCV RBC AUTO: 92.1 FL — SIGNIFICANT CHANGE UP (ref 80–100)
MICROCYTES BLD QL: SLIGHT — SIGNIFICANT CHANGE UP
MONOCYTES # BLD AUTO: 2.83 K/UL — HIGH (ref 0–0.9)
MONOCYTES NFR BLD AUTO: 13.1 % — SIGNIFICANT CHANGE UP (ref 2–14)
NEUTROPHILS # BLD AUTO: 17.64 K/UL — HIGH (ref 1.8–7.4)
NEUTROPHILS NFR BLD AUTO: 81.6 % — HIGH (ref 43–77)
NRBC # BLD: 0 /100 WBCS — SIGNIFICANT CHANGE UP (ref 0–0)
OVALOCYTES BLD QL SMEAR: SLIGHT — SIGNIFICANT CHANGE UP
PHOSPHATE SERPL-MCNC: 3.9 MG/DL — SIGNIFICANT CHANGE UP (ref 2.5–4.5)
PLAT MORPH BLD: NORMAL — SIGNIFICANT CHANGE UP
PLATELET # BLD AUTO: 226 K/UL — SIGNIFICANT CHANGE UP (ref 150–400)
POIKILOCYTOSIS BLD QL AUTO: SIGNIFICANT CHANGE UP
POLYCHROMASIA BLD QL SMEAR: SLIGHT — SIGNIFICANT CHANGE UP
POTASSIUM SERPL-MCNC: 3.6 MMOL/L — SIGNIFICANT CHANGE UP (ref 3.5–5.3)
POTASSIUM SERPL-SCNC: 3.6 MMOL/L — SIGNIFICANT CHANGE UP (ref 3.5–5.3)
PROCALCITONIN SERPL-MCNC: 5.28 NG/ML — HIGH (ref 0.02–0.1)
PROT SERPL-MCNC: 5.4 G/DL — LOW (ref 6–8.3)
PROTHROM AB SERPL-ACNC: 14.6 SEC — HIGH (ref 10.5–13.4)
RBC # BLD: 3.17 M/UL — LOW (ref 4.2–5.8)
RBC # FLD: 15.3 % — HIGH (ref 10.3–14.5)
RBC BLD AUTO: ABNORMAL
SCHISTOCYTES BLD QL AUTO: SLIGHT — SIGNIFICANT CHANGE UP
SODIUM SERPL-SCNC: 139 MMOL/L — SIGNIFICANT CHANGE UP (ref 135–145)
SPHEROCYTES BLD QL SMEAR: SLIGHT — SIGNIFICANT CHANGE UP
WBC # BLD: 21.62 K/UL — HIGH (ref 3.8–10.5)
WBC # FLD AUTO: 21.62 K/UL — HIGH (ref 3.8–10.5)

## 2023-04-04 RX ORDER — MAGNESIUM OXIDE 400 MG ORAL TABLET 241.3 MG
400 TABLET ORAL ONCE
Refills: 0 | Status: COMPLETED | OUTPATIENT
Start: 2023-04-04 | End: 2023-04-04

## 2023-04-04 RX ORDER — VALPROIC ACID (AS SODIUM SALT) 250 MG/5ML
750 SOLUTION, ORAL ORAL
Refills: 0 | Status: DISCONTINUED | OUTPATIENT
Start: 2023-04-04 | End: 2023-04-04

## 2023-04-04 RX ORDER — CEFAZOLIN SODIUM 1 G
VIAL (EA) INJECTION
Refills: 0 | Status: DISCONTINUED | OUTPATIENT
Start: 2023-04-04 | End: 2023-04-04

## 2023-04-04 RX ORDER — POTASSIUM CHLORIDE 20 MEQ
20 PACKET (EA) ORAL ONCE
Refills: 0 | Status: COMPLETED | OUTPATIENT
Start: 2023-04-04 | End: 2023-04-04

## 2023-04-04 RX ORDER — LACOSAMIDE 50 MG/1
100 TABLET ORAL
Refills: 0 | Status: DISCONTINUED | OUTPATIENT
Start: 2023-04-04 | End: 2023-04-10

## 2023-04-04 RX ORDER — CEFAZOLIN SODIUM 1 G
1000 VIAL (EA) INJECTION EVERY 12 HOURS
Refills: 0 | Status: DISCONTINUED | OUTPATIENT
Start: 2023-04-04 | End: 2023-04-04

## 2023-04-04 RX ORDER — COLLAGENASE CLOSTRIDIUM HIST. 250 UNIT/G
1 OINTMENT (GRAM) TOPICAL DAILY
Refills: 0 | Status: DISCONTINUED | OUTPATIENT
Start: 2023-04-04 | End: 2023-04-05

## 2023-04-04 RX ORDER — PANTOPRAZOLE SODIUM 20 MG/1
40 TABLET, DELAYED RELEASE ORAL
Refills: 0 | Status: DISCONTINUED | OUTPATIENT
Start: 2023-04-04 | End: 2023-04-10

## 2023-04-04 RX ORDER — DIVALPROEX SODIUM 500 MG/1
750 TABLET, DELAYED RELEASE ORAL
Refills: 0 | Status: DISCONTINUED | OUTPATIENT
Start: 2023-04-04 | End: 2023-04-10

## 2023-04-04 RX ORDER — DIVALPROEX SODIUM 500 MG/1
750 TABLET, DELAYED RELEASE ORAL
Refills: 0 | Status: DISCONTINUED | OUTPATIENT
Start: 2023-04-04 | End: 2023-04-04

## 2023-04-04 RX ORDER — CEFAZOLIN SODIUM 1 G
1000 VIAL (EA) INJECTION EVERY 12 HOURS
Refills: 0 | Status: DISCONTINUED | OUTPATIENT
Start: 2023-04-04 | End: 2023-04-08

## 2023-04-04 RX ADMIN — Medication 20 MILLIEQUIVALENT(S): at 05:42

## 2023-04-04 RX ADMIN — HEPARIN SODIUM 5000 UNIT(S): 5000 INJECTION INTRAVENOUS; SUBCUTANEOUS at 22:14

## 2023-04-04 RX ADMIN — DIVALPROEX SODIUM 750 MILLIGRAM(S): 500 TABLET, DELAYED RELEASE ORAL at 18:06

## 2023-04-04 RX ADMIN — OXYCODONE HYDROCHLORIDE 5 MILLIGRAM(S): 5 TABLET ORAL at 11:41

## 2023-04-04 RX ADMIN — LACOSAMIDE 100 MILLIGRAM(S): 50 TABLET ORAL at 18:05

## 2023-04-04 RX ADMIN — HEPARIN SODIUM 5000 UNIT(S): 5000 INJECTION INTRAVENOUS; SUBCUTANEOUS at 05:41

## 2023-04-04 RX ADMIN — Medication 100 MILLIGRAM(S): at 18:06

## 2023-04-04 RX ADMIN — Medication 0.25 MILLIGRAM(S): at 11:41

## 2023-04-04 RX ADMIN — Medication 4 MILLIGRAM(S): at 07:34

## 2023-04-04 RX ADMIN — Medication 25 MILLIGRAM(S): at 18:05

## 2023-04-04 RX ADMIN — OXYCODONE HYDROCHLORIDE 5 MILLIGRAM(S): 5 TABLET ORAL at 22:53

## 2023-04-04 RX ADMIN — OXYCODONE HYDROCHLORIDE 5 MILLIGRAM(S): 5 TABLET ORAL at 07:00

## 2023-04-04 RX ADMIN — CHLORHEXIDINE GLUCONATE 1 APPLICATION(S): 213 SOLUTION TOPICAL at 05:44

## 2023-04-04 RX ADMIN — LIDOCAINE 1 PATCH: 4 CREAM TOPICAL at 18:08

## 2023-04-04 RX ADMIN — LIDOCAINE 1 PATCH: 4 CREAM TOPICAL at 18:07

## 2023-04-04 RX ADMIN — OXYCODONE HYDROCHLORIDE 5 MILLIGRAM(S): 5 TABLET ORAL at 06:05

## 2023-04-04 RX ADMIN — PANTOPRAZOLE SODIUM 40 MILLIGRAM(S): 20 TABLET, DELAYED RELEASE ORAL at 11:15

## 2023-04-04 RX ADMIN — Medication 25 MILLIGRAM(S): at 05:41

## 2023-04-04 RX ADMIN — Medication 81 MILLIGRAM(S): at 11:15

## 2023-04-04 RX ADMIN — HEPARIN SODIUM 5000 UNIT(S): 5000 INJECTION INTRAVENOUS; SUBCUTANEOUS at 14:01

## 2023-04-04 RX ADMIN — OXYCODONE HYDROCHLORIDE 5 MILLIGRAM(S): 5 TABLET ORAL at 22:54

## 2023-04-04 RX ADMIN — LACOSAMIDE 120 MILLIGRAM(S): 50 TABLET ORAL at 07:13

## 2023-04-04 RX ADMIN — Medication 15 MILLILITER(S): at 11:16

## 2023-04-04 RX ADMIN — LIDOCAINE 1 PATCH: 4 CREAM TOPICAL at 03:00

## 2023-04-04 RX ADMIN — MAGNESIUM OXIDE 400 MG ORAL TABLET 400 MILLIGRAM(S): 241.3 TABLET ORAL at 05:41

## 2023-04-04 RX ADMIN — OXYCODONE HYDROCHLORIDE 5 MILLIGRAM(S): 5 TABLET ORAL at 12:40

## 2023-04-04 RX ADMIN — Medication 5 MILLIGRAM(S): at 22:14

## 2023-04-04 RX ADMIN — Medication 57.5 MILLIGRAM(S): at 07:03

## 2023-04-04 RX ADMIN — Medication 4 MILLIGRAM(S): at 11:16

## 2023-04-04 NOTE — CHART NOTE - NSCHARTNOTEFT_GEN_A_CORE
Admitting Diagnosis:   Patient is a 53y old  Male who presents with a chief complaint of expanding aortic arch and descending aortic aneurysm (04 Apr 2023 13:05)      PAST MEDICAL & SURGICAL HISTORY:  HTN (hypertension)      Aortic dissection      CAD (coronary artery disease)      Seizure disorder      S/P aortic bifurcation bypass graft      S/P CABG x 1      Current Nutrition Order:  regular - minced moist     PO Intake: Good (%) [   ]  Fair (50-75%) [   ] Poor (<25%) [ X ] - see below     GI Issues:   WNL, last BM 4/2 per chart review    Pain:  pain scale 6     Skin Integrity:  Sandro 12  generalized 2+ edema   R. groin wound, L. leg wound    Labs:   04-04    139  |  102  |  44<H>  ----------------------------<  82  3.6   |  25  |  3.73<H>    Ca    8.6      04 Apr 2023 01:35  Phos  3.9     04-04  Mg     1.9     04-04    TPro  5.4<L>  /  Alb  2.4<L>  /  TBili  0.6  /  DBili  x   /  AST  21  /  ALT  10  /  AlkPhos  81  04-04    CAPILLARY BLOOD GLUCOSE      POCT Blood Glucose.: 106 mg/dL (04 Apr 2023 11:18)  POCT Blood Glucose.: 92 mg/dL (04 Apr 2023 07:39)  POCT Blood Glucose.: 82 mg/dL (04 Apr 2023 00:44)  POCT Blood Glucose.: 89 mg/dL (03 Apr 2023 17:30)      Medications:  MEDICATIONS  (STANDING):  aspirin  chewable 81 milliGRAM(s) Oral daily  chlorhexidine 2% Cloths 1 Application(s) Topical daily  dextrose 10%. 250 milliLiter(s) (250 mL/Hr) IV Continuous <Continuous>  dextrose 5%. 1000 milliLiter(s) (50 mL/Hr) IV Continuous <Continuous>  dextrose 5%. 1000 milliLiter(s) (100 mL/Hr) IV Continuous <Continuous>  dextrose 50% Injectable 50 milliLiter(s) IV Push every 15 minutes  dextrose 50% Injectable 25 milliLiter(s) IV Push every 15 minutes  divalproex  milliGRAM(s) Oral <User Schedule>  glucagon  Injectable 1 milliGRAM(s) IntraMuscular once  heparin   Injectable 5000 Unit(s) SubCutaneous every 8 hours  insulin lispro (ADMELOG) corrective regimen sliding scale   SubCutaneous Before meals and at bedtime  lacosamide 100 milliGRAM(s) Oral two times a day  lidocaine   4% Patch 1 Patch Transdermal every 24 hours  lidocaine 2% Jelly 10 milliLiter(s) IntraUrethral once  melatonin 5 milliGRAM(s) Oral at bedtime  metoprolol tartrate 25 milliGRAM(s) Oral two times a day  multivitamin/minerals/iron Oral Solution (CENTRUM) 15 milliLiter(s) Oral daily  pantoprazole    Tablet 40 milliGRAM(s) Oral before breakfast  sodium chloride 0.9%. 1000 milliLiter(s) (10 mL/Hr) IV Continuous <Continuous>    MEDICATIONS  (PRN):  dextrose Oral Gel 15 Gram(s) Oral once PRN Blood Glucose LESS THAN 70 milliGRAM(s)/deciliter  oxyCODONE    IR 5 milliGRAM(s) Oral every 6 hours PRN Moderate Pain (4 - 6)      Weight:  Daily     Daily     Weight Change: 4/1 88.8 kg; 4/1 86.8kg - received HD, wt fluctuations related to fluid shifts as expected     Estimated energy needs:   Estimated Energy Needs Weight (lbs)	178 lb  Estimated Energy Needs Weight (kg)	80.7 kg  Estimated Energy Needs From (ifeanyi/kg)	25   Estimated Energy Needs To (ifeanyi/kg)	30   Estimated Energy Needs Calculated From (ifeanyi/kg)	2017   Estimated Energy Needs Calculated To (ifeanyi/kg)	2421     Estimated Protein Needs Weight (lbs)	178 lb  Estimated Protein Needs Weight (kg)	80.7 kg  Estimated Protein Needs From (g/kg)	1.2  Estimated Protein Needs To (g/kg)	1.4  Estimated Protein Needs Calculated From (g/kg)	97.2  Estimated Protein Needs Calculated To (g/kg)	113      Subjective:   Pt is a 53yr old male with PMH HTN, type A dissection sp Dacron grafts and AV resuspension (2013), CAD sp CABG x 1 (Adam, 5/2013, SVG-RCA), seizures, admitted for surgical mgmt of progression of aneurysmal disease. Patient underwent replacement of transverse aortic arch, second stage thoracic endovascular aortic repair, aorto-axillary bypass, AV replacement (bio 23mm), and CABG x 1 (SVG-RCA) EF 75% with Dr. Pierre 3/20. Patient received 7 units pRBC, 6 FFP, 4 plt, 15 cryo, 1000 FEIBA, and uo 1300cc. Started on lasix infusion and phenylephrine, bicarb, Armaan, levo and vaso, with lactic acidosis. 3/21 L femoral HD cath placed and CVVHD started with improvement in acidosis. Pressors off by end of day. Primacor weaned overnight. Patient woke up and follows commands, exhibited facial twitching cw prior seizures per wife and ativan given followed by vEEG placement. Neurology consulted for recs given subtherapeutic AED levels. Given 2 units pRBC. 3/22 Neurology recommended dc vEEG given low suspicion for seizures. Fluid removal initiated with CVVHD. Overnight poor oxygenation requiring bronchoscopy, on laquita and vaso, D10 started for hypoglycemia. 3/23 large residuals, reglan started. CTH performed for poor mentation-no bleed. Amio given for afib. Received 1 unit pRBC and 1 plt, femoral a line removed. CVVHD stopped and diuretic initiated with good response. Primacor turned off. 3/25 new TLC and R cordis with swan placed, tolerated cpap. D10 for hypoglycemia. Underwent aquaphoresis via new [facility] HD cath. Now with stable lytes, adequate clearance and stable renal function, responding well with Bumex 2mg Q12hrs. 4/1: HD Aquapheresis in between for volume removal. Started back on PO diet. Pt's tube feedings have been discontinued on 4/1/23, pt's diet has been restarted on 4/1/23, calorie count started on 4/1/23.     Visited pt at bedside this AM. Remains on minced and moist diet with thin liquids per SLP recommendations. Intake remains variable, calorie count performed. See below. Pt in pain, not amenable to full nutrition assessment at this time. Per EMR, last BM 4/2 suggesting mild constipation - adjust bowel regimen per team. Nutrition-related labs are unremarkable at this time. View full recs below     3 Day Calorie Count  (4/1 - 4/3):  Day 1: 30kcal, 0g pro **however received NGT and Nepro ONS in the AM. Calorie count refers to dinner only**  Day 2: 607kcal, 21g pro  Meeting 30% estimated energy and 21.6% estimated protein needs  Day 3: 420kcal, 19g pro **only from Nepro ONS**  Meeting 20.8% estimated energy and 21.6% estimated protein needs    CALORIE COUNT ASSESSMENT: unable to assess full 3-day calorie count given feedings provided via NGT on day 1 of calorie count. pt currently meeting < 25% EER via PO intake only. Of note pt tolerates ONS well. Will recommend trialing ONS regimen and continue with current diet order. However if poor PO intake/tolerance continues, please restart TF. View full recs below    Clinical nutrition services will continue to follow up pt per organizational policy. Please place new consult for any acute nutrition-related issues that may arise prior to follow up   Previous Nutrition Diagnosis: Inadequate Energy Intake r/t inability to meet EER on current diet AEB intake <75% EER    Active [ X ]  Resolved [   ]    Goal: Consistently meets > 75% EER via most appropriate route of nutrition     Recommendations:  1. Continue align nutrition with SLP recs at all time  2. Continue current diet order (soft moist)  3. Add Nepro Carbsteady ONS TID (1260Kcal, 57g protein)  >> if poor PO intake/tolerance continues, please restart TF  4. Monitor PO intake closely; honor pt food preferences as able  5. Provide max encouragement with all meals   6. Monitor wts, skin integrity, chemistry, GI fxn  7. pain/bowel regimen per team   6. RD to remain available for recs adjustment prn or will follow up pt per organizational policy     Education: deferred     Risk Level: High [ X ] Moderate [   ] Low [   ]    Yamile Scanlon RD

## 2023-04-04 NOTE — CHART NOTE - NSCHARTNOTEFT_GEN_A_CORE
Atrial and ventricular wires gently removed with minimal resistance. Pericardial bella in place at time of removal. Patient to remain in bed x 1 hours. Vital signs monitored m36syrs, hemodynamically stable

## 2023-04-04 NOTE — PROGRESS NOTE ADULT - ASSESSMENT
52 yo M with non-oliguric iATN i/s/o replacement of aortic arch and TAVR on 3/20- prolonged OR time and massive transfusion and other blood products. Initiated on CVVHD (3/21-3/24) for clearance and volume. 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  Showing some ATN recovery, U/O improving  CVVHD (3/21- 3/24)  AQ (3/31-4/2)    Plan:  Volume status and electrolytes stable, will defer HD or aqua for today  Can continue to dose Bumex if U/O drops  strict I&Os  BMP per icu protocol  Maintain MAP >70 for renal perfusion     #Anemia  -Check iron profile and ferritin    Discussed w/ CTICU team

## 2023-04-04 NOTE — PROGRESS NOTE ADULT - SUBJECTIVE AND OBJECTIVE BOX
CTICU  CRITICAL  CARE  attending     Hand off received 					   Pertinent clinical, laboratory, radiographic, hemodynamic, echocardiographic, respiratory data, microbiologic data and chart were reviewed and analyzed frequently throughout the course of the day and night        52 year old male with HTN, type A aortic dissection s/p Dacron grafts, AV resuspension in 2013,CAD s/p CABG x 1 SVG to RCA (5/2013 with Dr. Medina), seizure disorder (last episode on 7/4/22).  He is a current every day marijuana user  CTA chest, abdomen and pelvis 11/30/22: Status post graft repair of the ascending aorta and portion of aortic arch.  Dissecting aneurysm of the descending thoracic aorta (measuring up to 5.7 cm) and abdominal aorta (3.5 cm infrarenal), similar to the MR angiogram of 9/19/2022.  Nonocclusive dissection flap present within the innominate artery, aneurysmal right subclavian artery and proximal right common carotid artery as well as aneurysmal left common iliac artery.    He was referred by Dr. Jacqueline Gonsales.  He ws screened for Triomphe study but did not qualify.    S/P AVR  Aortic arch replacement  S/P CABG (SVG to RCA).  S/P Aorto left axillary by pass.  S/P TEVAR to descending aorta.      FAMILY HISTORY:  No pertinent family history in first degree relatives    PAST MEDICAL & SURGICAL HISTORY:  HTN (hypertension)  Aortic dissection  CAD (coronary artery disease)  Seizure disorder  S/P aortic bifurcation bypass graft  S/P CABG x 1            14 system review was unremarkable    Vital signs, hemodynamic and respiratory parameters were reviewed from the bedside nursing flow sheet.  ICU Vital Signs Last 24 Hrs  T(C): 36.7 (04 Apr 2023 17:18), Max: 37.3 (04 Apr 2023 05:13)  T(F): 98.1 (04 Apr 2023 17:18), Max: 99.2 (04 Apr 2023 05:13)  HR: 97 (04 Apr 2023 19:00) (93 - 118)  BP: 116/70 (04 Apr 2023 19:00) (111/76 - 163/98)  BP(mean): 87 (04 Apr 2023 19:00) (87 - 123)  ABP: --  ABP(mean): --  RR: 19 (04 Apr 2023 19:00) (15 - 20)  SpO2: 97% (04 Apr 2023 19:00) (70% - 100%)    O2 Parameters below as of 04 Apr 2023 19:00  Patient On (Oxygen Delivery Method): room air          Adult Advanced Hemodynamics Last 24 Hrs  CVP(mm Hg): 6 (04 Apr 2023 09:05) (6 - 22)  CVP(cm H2O): --  CO: --  CI: --  PA: --  PA(mean): --  PCWP: --  SVR: --  SVRI: --  PVR: --  PVRI: --, ABG - ( 03 Apr 2023 14:49 )  pH, Arterial: 7.48  pH, Blood: x     /  pCO2: 32    /  pO2: 79    / HCO3: 24    / Base Excess: 0.9   /  SaO2: 97.7                Intake and output was reviewed and the fluid balance was calculated  Daily     Daily   I&O's Summary    03 Apr 2023 07:01  -  04 Apr 2023 07:00  --------------------------------------------------------  IN: 1252 mL / OUT: 2095 mL / NET: -843 mL    04 Apr 2023 07:01  -  04 Apr 2023 19:30  --------------------------------------------------------  IN: 108 mL / OUT: 540 mL / NET: -432 mL        All lines and drain sites were assessed    Neuro: No change in the mental status from the baseline. Follows commands.  Moves all 4 extremities.  Neck: No JVD.  CVS: S1, S2, No S3.  Lungs: Good air entry bilaterally.  Abd: Soft. No tenderness. + Bowel sounds.  Vascular: + DP/PT.  Extremities: Left knee superficial ulcer. Mild cellulitis of the LLE   Lymphatic: Normal.  Skin: No abnormalities.      labs  CBC Full  -  ( 04 Apr 2023 01:35 )  WBC Count : 21.62 K/uL  RBC Count : 3.17 M/uL  Hemoglobin : 9.8 g/dL  Hematocrit : 29.2 %  Platelet Count - Automated : 226 K/uL  Mean Cell Volume : 92.1 fl  Mean Cell Hemoglobin : 30.9 pg  Mean Cell Hemoglobin Concentration : 33.6 gm/dL  Auto Neutrophil # : 17.64 K/uL  Auto Lymphocyte # : 0.95 K/uL  Auto Monocyte # : 2.83 K/uL  Auto Eosinophil # : 0.19 K/uL  Auto Basophil # : 0.00 K/uL  Auto Neutrophil % : 81.6 %  Auto Lymphocyte % : 4.4 %  Auto Monocyte % : 13.1 %  Auto Eosinophil % : 0.9 %  Auto Basophil % : 0.0 %    04-04    139  |  102  |  44<H>  ----------------------------<  82  3.6   |  25  |  3.73<H>    Ca    8.6      04 Apr 2023 01:35  Phos  3.9     04-04  Mg     1.9     04-04    TPro  5.4<L>  /  Alb  2.4<L>  /  TBili  0.6  /  DBili  x   /  AST  21  /  ALT  10  /  AlkPhos  81  04-04    PT/INR - ( 04 Apr 2023 01:35 )   PT: 14.6 sec;   INR: 1.22          PTT - ( 04 Apr 2023 01:35 )  PTT:30.7 sec  The current medications were reviewed   MEDICATIONS  (STANDING):  aspirin  chewable 81 milliGRAM(s) Oral daily  ceFAZolin   IVPB 1000 milliGRAM(s) IV Intermittent every 12 hours  chlorhexidine 2% Cloths 1 Application(s) Topical daily  collagenase Ointment 1 Application(s) Topical daily  dextrose 5%. 1000 milliLiter(s) (50 mL/Hr) IV Continuous <Continuous>  dextrose 5%. 1000 milliLiter(s) (100 mL/Hr) IV Continuous <Continuous>  dextrose 50% Injectable 50 milliLiter(s) IV Push every 15 minutes  dextrose 50% Injectable 25 milliLiter(s) IV Push every 15 minutes  divalproex  milliGRAM(s) Oral <User Schedule>  glucagon  Injectable 1 milliGRAM(s) IntraMuscular once  heparin   Injectable 5000 Unit(s) SubCutaneous every 8 hours  insulin lispro (ADMELOG) corrective regimen sliding scale   SubCutaneous Before meals and at bedtime  lacosamide 100 milliGRAM(s) Oral two times a day  lidocaine   4% Patch 1 Patch Transdermal every 24 hours  lidocaine 2% Jelly 10 milliLiter(s) IntraUrethral once  melatonin 5 milliGRAM(s) Oral at bedtime  metoprolol tartrate 25 milliGRAM(s) Oral two times a day  multivitamin/minerals/iron Oral Solution (CENTRUM) 15 milliLiter(s) Oral daily  pantoprazole    Tablet 40 milliGRAM(s) Oral before breakfast  sodium chloride 0.9%. 1000 milliLiter(s) (10 mL/Hr) IV Continuous <Continuous>    MEDICATIONS  (PRN):  dextrose Oral Gel 15 Gram(s) Oral once PRN Blood Glucose LESS THAN 70 milliGRAM(s)/deciliter  oxyCODONE    IR 5 milliGRAM(s) Oral every 6 hours PRN Moderate Pain (4 - 6)        53 year old  Male with expanding aortic arch and descending aortic aneurysm   S/P AVR  Aortic arch replacement  S/P CABG (SVG to RCA).  S/P Aorto left axillary by pass.  S/P TEVAR to descending aorta.  Long postoperative course complicated by vent dependent respiratory failure, renal failure, thrombocytopenia, serratia pneumonia.  Leucocytosis.  Hemodynamically stable.  Good oxygenation.  Fair urine out put.        My plan includes :  Statin and Betablocker.  Anti seizure Rx.  IV antibiotic Rx.   Close hemodynamic monitoring  Monitor for arrhythmias and monitor parameters for organ perfusion  Monitor neurologic status  Monitor renal function.  Head of the bed should remain elevated to 45 deg .   Chest PT and IS will be encouraged  Monitor adequacy of oxygenation.  Nutritional goals will be met using po eventually , ensure adequate caloric intake and monitor the same  Stress ulcer and VTE prophylaxis will be achieved    Glycemic control is satisfactory  Electrolytes have been repleted as necessary and wound care has been carried out. Pain control has been achieved.   Aggressive physical therapy and early mobility and ambulation goals will be met   The family was updated about the course and plan  CRITICAL CARE TIME SPENT in evaluation and management, reassessments, review and interpretation of labs and x-rays, formulating a plan and coordinating care: ___30____ MIN.  Time does not include procedural time.  CTICU ATTENDING     					    José Miguel Torres MD

## 2023-04-04 NOTE — PROGRESS NOTE ADULT - SUBJECTIVE AND OBJECTIVE BOX
--------------------------------------------------------------------------------  Chief Complaint: ESRD/Ongoing hemodialysis requirement    24 hour events/subjective:    Seen this morning doing well, urine output of 1.7L with 2 doses of bumex. Creatinine with minimal elevation, discussed with team. Can give diuretics again today    PAST HISTORY  --------------------------------------------------------------------------------  No significant changes to PMH, PSH, FHx, SHx, unless otherwise noted    ALLERGIES & MEDICATIONS  --------------------------------------------------------------------------------  Allergies    No Known Allergies    Intolerances      Standing Inpatient Medications  aspirin  chewable 81 milliGRAM(s) Oral daily  chlorhexidine 2% Cloths 1 Application(s) Topical daily  dextrose 10%. 250 milliLiter(s) IV Continuous <Continuous>  dextrose 5%. 1000 milliLiter(s) IV Continuous <Continuous>  dextrose 5%. 1000 milliLiter(s) IV Continuous <Continuous>  dextrose 50% Injectable 50 milliLiter(s) IV Push every 15 minutes  dextrose 50% Injectable 25 milliLiter(s) IV Push every 15 minutes  divalproex  milliGRAM(s) Oral <User Schedule>  glucagon  Injectable 1 milliGRAM(s) IntraMuscular once  heparin   Injectable 5000 Unit(s) SubCutaneous every 8 hours  insulin lispro (ADMELOG) corrective regimen sliding scale   SubCutaneous Before meals and at bedtime  lacosamide 100 milliGRAM(s) Oral two times a day  lidocaine   4% Patch 1 Patch Transdermal every 24 hours  lidocaine 2% Jelly 10 milliLiter(s) IntraUrethral once  melatonin 5 milliGRAM(s) Oral at bedtime  metoprolol tartrate 25 milliGRAM(s) Oral two times a day  multivitamin/minerals/iron Oral Solution (CENTRUM) 15 milliLiter(s) Oral daily  pantoprazole    Tablet 40 milliGRAM(s) Oral before breakfast  sodium chloride 0.9%. 1000 milliLiter(s) IV Continuous <Continuous>    PRN Inpatient Medications  dextrose Oral Gel 15 Gram(s) Oral once PRN  oxyCODONE    IR 5 milliGRAM(s) Oral every 6 hours PRN      REVIEW OF SYSTEMS  --------------------------------------------------------------------------------  All other systems were reviewed and are negative, except as noted.    VITALS/PHYSICAL EXAM  --------------------------------------------------------------------------------  T(C): 37.3 (04-04-23 @ 09:10), Max: 37.3 (04-04-23 @ 05:13)  HR: 110 (04-04-23 @ 12:45) (93 - 118)  BP: 111/76 (04-04-23 @ 12:45) (111/76 - 163/98)  RR: 18 (04-04-23 @ 12:45) (12 - 20)  SpO2: 96% (04-04-23 @ 12:45) (88% - 100%)  Wt(kg): --  Drug Dosing Weight  Height (cm): 182.9 (20 Mar 2023 07:23)  Weight (kg): 77.1 (20 Mar 2023 07:23)  BMI (kg/m2): 23 (20 Mar 2023 07:23)  BSA (m2): 1.99 (20 Mar 2023 07:23)        04-03-23 @ 07:01  -  04-04-23 @ 07:00  --------------------------------------------------------  IN: 1252 mL / OUT: 2095 mL / NET: -843 mL    04-04-23 @ 07:01  -  04-04-23 @ 13:07  --------------------------------------------------------  IN: 58 mL / OUT: 245 mL / NET: -187 mL    PHYSICAL EXAM:  GENERAL: NAD   NECK: supple, No JVD  CHEST/LUNG: CTLA B/L, on RA  HEART: normal S1S2, RRR  ABDOMEN: Soft, Nontender, +BS, No flank tenderness bilateral  EXTREMITIES: No clubbing, cyanosis, or edema   NEUROLOGY: AAO x3, no focal neurological deficit  ACCESS: Rt IJ S/Q HD Cath c/d/i      LABS/STUDIES  --------------------------------------------------------------------------------              9.8    21.62 >-----------<  226      [04-04-23 @ 01:35]              29.2     139  |  102  |  44  ----------------------------<  82      [04-04-23 @ 01:35]  3.6   |  25  |  3.73        Ca     8.6     [04-04-23 @ 01:35]      Mg     1.9     [04-04-23 @ 01:35]      Phos  3.9     [04-04-23 @ 01:35]    TPro  5.4  /  Alb  2.4  /  TBili  0.6  /  DBili  x   /  AST  21  /  ALT  10  /  AlkPhos  81  [04-04-23 @ 01:35]    PT/INR: PT 14.6 , INR 1.22       [04-04-23 @ 01:35]  PTT: 30.7       [04-04-23 @ 01:35]      TSH 0.626      [03-09-23 @ 12:54]  Lipid: chol 130, TG 85, HDL 43, LDL --      [03-09-23 @ 10:04]    HBsAb Nonreact      [03-30-23 @ 12:02]  HBsAg Nonreact      [03-09-23 @ 12:54]  HCV 0.29, Nonreact      [03-30-23 @ 12:02]      RADIOLOGY:  -------------------------------------------------------------------------------------- --------------------------------------------------------------------------------  Chief Complaint: ESRD/Ongoing hemodialysis requirement    24 hour events/subjective:    Seen this morning doing well, urine output of 1.7L with 2 doses of bumex. Creatinine with minimal elevation, discussed with team. Can give diuretics again today    PAST HISTORY  --------------------------------------------------------------------------------  No significant changes to PMH, PSH, FHx, SHx, unless otherwise noted    ALLERGIES & MEDICATIONS  --------------------------------------------------------------------------------  Allergies    No Known Allergies    Intolerances      Standing Inpatient Medications  aspirin  chewable 81 milliGRAM(s) Oral daily  chlorhexidine 2% Cloths 1 Application(s) Topical daily  dextrose 10%. 250 milliLiter(s) IV Continuous <Continuous>  dextrose 5%. 1000 milliLiter(s) IV Continuous <Continuous>  dextrose 5%. 1000 milliLiter(s) IV Continuous <Continuous>  dextrose 50% Injectable 50 milliLiter(s) IV Push every 15 minutes  dextrose 50% Injectable 25 milliLiter(s) IV Push every 15 minutes  divalproex  milliGRAM(s) Oral <User Schedule>  glucagon  Injectable 1 milliGRAM(s) IntraMuscular once  heparin   Injectable 5000 Unit(s) SubCutaneous every 8 hours  insulin lispro (ADMELOG) corrective regimen sliding scale   SubCutaneous Before meals and at bedtime  lacosamide 100 milliGRAM(s) Oral two times a day  lidocaine   4% Patch 1 Patch Transdermal every 24 hours  lidocaine 2% Jelly 10 milliLiter(s) IntraUrethral once  melatonin 5 milliGRAM(s) Oral at bedtime  metoprolol tartrate 25 milliGRAM(s) Oral two times a day  multivitamin/minerals/iron Oral Solution (CENTRUM) 15 milliLiter(s) Oral daily  pantoprazole    Tablet 40 milliGRAM(s) Oral before breakfast  sodium chloride 0.9%. 1000 milliLiter(s) IV Continuous <Continuous>    PRN Inpatient Medications  dextrose Oral Gel 15 Gram(s) Oral once PRN  oxyCODONE    IR 5 milliGRAM(s) Oral every 6 hours PRN      REVIEW OF SYSTEMS  --------------------------------------------------------------------------------  All other systems were reviewed and are negative, except as noted.    VITALS/PHYSICAL EXAM  --------------------------------------------------------------------------------  T(C): 37.3 (04-04-23 @ 09:10), Max: 37.3 (04-04-23 @ 05:13)  HR: 110 (04-04-23 @ 12:45) (93 - 118)  BP: 111/76 (04-04-23 @ 12:45) (111/76 - 163/98)  RR: 18 (04-04-23 @ 12:45) (12 - 20)  SpO2: 96% (04-04-23 @ 12:45) (88% - 100%)  Wt(kg): --  Drug Dosing Weight  Height (cm): 182.9 (20 Mar 2023 07:23)  Weight (kg): 77.1 (20 Mar 2023 07:23)  BMI (kg/m2): 23 (20 Mar 2023 07:23)  BSA (m2): 1.99 (20 Mar 2023 07:23)        04-03-23 @ 07:01  -  04-04-23 @ 07:00  --------------------------------------------------------  IN: 1252 mL / OUT: 2095 mL / NET: -843 mL    04-04-23 @ 07:01  -  04-04-23 @ 13:07  --------------------------------------------------------  IN: 58 mL / OUT: 245 mL / NET: -187 mL    PHYSICAL EXAM:  GENERAL: NAD   NECK: supple, No JVD  CHEST/LUNG: CTLA B/L, on RA  HEART: normal S1S2, RRR  ABDOMEN: Soft, Nontender, +BS, No flank tenderness bilateral  EXTREMITIES: No clubbing, cyanosis, or edema   NEUROLOGY: Asnwers questions, no focal neurological deficit  ACCESS: Rt IJ S/Q HD Cath c/d/i      LABS/STUDIES  --------------------------------------------------------------------------------              9.8    21.62 >-----------<  226      [04-04-23 @ 01:35]              29.2     139  |  102  |  44  ----------------------------<  82      [04-04-23 @ 01:35]  3.6   |  25  |  3.73        Ca     8.6     [04-04-23 @ 01:35]      Mg     1.9     [04-04-23 @ 01:35]      Phos  3.9     [04-04-23 @ 01:35]    TPro  5.4  /  Alb  2.4  /  TBili  0.6  /  DBili  x   /  AST  21  /  ALT  10  /  AlkPhos  81  [04-04-23 @ 01:35]    PT/INR: PT 14.6 , INR 1.22       [04-04-23 @ 01:35]  PTT: 30.7       [04-04-23 @ 01:35]      TSH 0.626      [03-09-23 @ 12:54]  Lipid: chol 130, TG 85, HDL 43, LDL --      [03-09-23 @ 10:04]    HBsAb Nonreact      [03-30-23 @ 12:02]  HBsAg Nonreact      [03-09-23 @ 12:54]  HCV 0.29, Nonreact      [03-30-23 @ 12:02]      RADIOLOGY:  --------------------------------------------------------------------------------------

## 2023-04-04 NOTE — PROGRESS NOTE ADULT - SUBJECTIVE AND OBJECTIVE BOX
INTERVAL COURSE  Hemodynamically stable, afebrile   Leukocytosis unchanged, renal fx plateauing with good UOP in response to diuresis neg FB  Complaining of pigtail site discomfort, refused to get OOB in am     VITALS  Vital Signs Last 24 Hrs  T(C): 37.3 (04 Apr 2023 09:10), Max: 37.3 (04 Apr 2023 05:13)  T(F): 99.1 (04 Apr 2023 09:10), Max: 99.2 (04 Apr 2023 05:13)  HR: 109 (04 Apr 2023 10:00) (93 - 118)  BP: 131/94 (04 Apr 2023 08:00) (129/79 - 160/83)  BP(mean): 110 (04 Apr 2023 08:00) (97 - 123)  RR: 16 (04 Apr 2023 08:00) (12 - 20)  SpO2: 99% (04 Apr 2023 10:00) (92% - 100%)    Parameters below as of 04 Apr 2023 08:00  Patient On (Oxygen Delivery Method): room air    I&O's Summary    03 Apr 2023 07:01  -  04 Apr 2023 07:00  --------------------------------------------------------  IN: 1252 mL / OUT: 2095 mL / NET: -843 mL    PHYSICAL EXAM  General: A&Ox 3; NAD  Respiratory: mild basilar crackle R>L  Cardiovascular: Sinus tachycardia   Gastrointestinal: Soft; NTND   Extremities: Warm, LE anasarcic L>R, left knee akin abrasion and sloughing mildly oozing   Neurological:  CNII-XII grossly intact; no obvious focal deficits    MEDICATIONS  (STANDING):  ALPRAZolam 0.25 milliGRAM(s) Oral once  aspirin  chewable 81 milliGRAM(s) Oral daily  chlorhexidine 2% Cloths 1 Application(s) Topical daily  dextrose 10%. 250 milliLiter(s) (250 mL/Hr) IV Continuous <Continuous>  dextrose 5%. 1000 milliLiter(s) (50 mL/Hr) IV Continuous <Continuous>  dextrose 5%. 1000 milliLiter(s) (100 mL/Hr) IV Continuous <Continuous>  dextrose 50% Injectable 50 milliLiter(s) IV Push every 15 minutes  dextrose 50% Injectable 25 milliLiter(s) IV Push every 15 minutes  divalproex  milliGRAM(s) Oral <User Schedule>  glucagon  Injectable 1 milliGRAM(s) IntraMuscular once  heparin   Injectable 5000 Unit(s) SubCutaneous every 8 hours  insulin lispro (ADMELOG) corrective regimen sliding scale   SubCutaneous Before meals and at bedtime  lacosamide 100 milliGRAM(s) Oral two times a day  lidocaine   4% Patch 1 Patch Transdermal every 24 hours  lidocaine 2% Jelly 10 milliLiter(s) IntraUrethral once  melatonin 5 milliGRAM(s) Oral at bedtime  metoprolol tartrate 25 milliGRAM(s) Oral two times a day  multivitamin/minerals/iron Oral Solution (CENTRUM) 15 milliLiter(s) Oral daily  pantoprazole    Tablet 40 milliGRAM(s) Oral before breakfast  sodium chloride 0.9%. 1000 milliLiter(s) (10 mL/Hr) IV Continuous <Continuous>    MEDICATIONS  (PRN):  dextrose Oral Gel 15 Gram(s) Oral once PRN Blood Glucose LESS THAN 70 milliGRAM(s)/deciliter  oxyCODONE    IR 5 milliGRAM(s) Oral every 6 hours PRN Moderate Pain (4 - 6)      LABS                        9.8    21.62 )-----------( 226      ( 04 Apr 2023 01:35 )             29.2     04-04    139  |  102  |  44<H>  ----------------------------<  82  3.6   |  25  |  3.73<H>    Ca    8.6      04 Apr 2023 01:35  Phos  3.9     04-04  Mg     1.9     04-04    TPro  5.4<L>  /  Alb  2.4<L>  /  TBili  0.6  /  DBili  x   /  AST  21  /  ALT  10  /  AlkPhos  81  04-04    LIVER FUNCTIONS - ( 04 Apr 2023 01:35 )  Alb: 2.4 g/dL / Pro: 5.4 g/dL / ALK PHOS: 81 U/L / ALT: 10 U/L / AST: 21 U/L / GGT: x           PT/INR - ( 04 Apr 2023 01:35 )   PT: 14.6 sec;   INR: 1.22          PTT - ( 04 Apr 2023 01:35 )  PTT:30.7 sec    IMAGING/EKG/ETC  Reviewed.

## 2023-04-04 NOTE — PROGRESS NOTE ADULT - ASSESSMENT
52 year old male, current every day marijuana use w/pmh of HTN, type A aortic dissection s/p Dacron grafts, AV resuspension in 2013,CAD s/p CABG x 1 SVG to RCA (5/2013 with Dr. Medina), seizure disorder (last episode on 7/4/22) presents for a follow up visit for evaluation and management of his aortic pathology and deemed a candidate for a reop total arch replacement given aortic aneurysm and chronic dissection.  S/p AVR/ Arch replacement/ aorto-axillary bypass- CABG x 1 and TEVAR   3/20  Postop course with worsening renal failure and sig acidosis started on CVVHD  Afib 3/23  Extubated 03/29  completed 7 day course of zosyn for serratia VAP last day 4/2   A/p   - MS and neuro exam intact- continue AEDs per epilepsy recs   - postop afib converted to sinus- IV and PO amio loaded not on maintenance amio- continue lopressor 25 mg BID   Titrating according to MAPs > 70 for adequate renal perfusion   - Renal failure most likely iATN given long operation and clamp time/ required CVVHD for acidosis though non oliguric renal injury-->off dialysis   intermittently required IHD and Aqua, HD cath removed 4/2  No need for urgent dialytic interventions- good diuretic response   Clinically euvolemic  CVP 4-6- Will consider PRN diuresis to maintain neutral FB replete lytes for k> 4 and mg > 2   H&H and plt in good range--> pigtail still draining-serosang will leave in place  leukocytosis for the past few days in 20s, mild left shift no bandemia and afebrile, no diarrhea has a left knee ulcer w/ sloughing   left LE swelling--> wound care consulted/ will obtain Doppler study  Continue ICU prophylactic measures/ dc LIJ HD cath and [place]   Optimize nutrition, ambulation and daily PT       ATTENDING: I have personally and independently provided 90 Min of critical care services. this excludes time spent on sperate procedures or teaching.

## 2023-04-05 LAB
ALBUMIN SERPL ELPH-MCNC: 2.1 G/DL — LOW (ref 3.3–5)
ALBUMIN SERPL ELPH-MCNC: 2.7 G/DL — LOW (ref 3.3–5)
ALP SERPL-CCNC: 78 U/L — SIGNIFICANT CHANGE UP (ref 40–120)
ALP SERPL-CCNC: 95 U/L — SIGNIFICANT CHANGE UP (ref 40–120)
ALT FLD-CCNC: 10 U/L — SIGNIFICANT CHANGE UP (ref 10–45)
ALT FLD-CCNC: 10 U/L — SIGNIFICANT CHANGE UP (ref 10–45)
ANION GAP SERPL CALC-SCNC: -2 MMOL/L — LOW (ref 5–17)
ANION GAP SERPL CALC-SCNC: 6 MMOL/L — SIGNIFICANT CHANGE UP (ref 5–17)
APTT BLD: 23.7 SEC — LOW (ref 27.5–35.5)
AST SERPL-CCNC: 21 U/L — SIGNIFICANT CHANGE UP (ref 10–40)
AST SERPL-CCNC: 26 U/L — SIGNIFICANT CHANGE UP (ref 10–40)
BILIRUB SERPL-MCNC: 0.5 MG/DL — SIGNIFICANT CHANGE UP (ref 0.2–1.2)
BILIRUB SERPL-MCNC: 0.6 MG/DL — SIGNIFICANT CHANGE UP (ref 0.2–1.2)
BUN SERPL-MCNC: 35 MG/DL — HIGH (ref 7–23)
BUN SERPL-MCNC: 43 MG/DL — HIGH (ref 7–23)
CALCIUM SERPL-MCNC: 8.3 MG/DL — LOW (ref 8.4–10.5)
CALCIUM SERPL-MCNC: 9 MG/DL — SIGNIFICANT CHANGE UP (ref 8.4–10.5)
CHLORIDE SERPL-SCNC: 104 MMOL/L — SIGNIFICANT CHANGE UP (ref 96–108)
CHLORIDE SERPL-SCNC: 107 MMOL/L — SIGNIFICANT CHANGE UP (ref 96–108)
CO2 SERPL-SCNC: 24 MMOL/L — SIGNIFICANT CHANGE UP (ref 22–31)
CO2 SERPL-SCNC: 25 MMOL/L — SIGNIFICANT CHANGE UP (ref 22–31)
CREAT SERPL-MCNC: 3.19 MG/DL — HIGH (ref 0.5–1.3)
CREAT SERPL-MCNC: 3.39 MG/DL — HIGH (ref 0.5–1.3)
EGFR: 21 ML/MIN/1.73M2 — LOW
EGFR: 22 ML/MIN/1.73M2 — LOW
GLUCOSE BLDC GLUCOMTR-MCNC: 110 MG/DL — HIGH (ref 70–99)
GLUCOSE BLDC GLUCOMTR-MCNC: 56 MG/DL — LOW (ref 70–99)
GLUCOSE BLDC GLUCOMTR-MCNC: 63 MG/DL — LOW (ref 70–99)
GLUCOSE BLDC GLUCOMTR-MCNC: 80 MG/DL — SIGNIFICANT CHANGE UP (ref 70–99)
GLUCOSE BLDC GLUCOMTR-MCNC: 82 MG/DL — SIGNIFICANT CHANGE UP (ref 70–99)
GLUCOSE BLDC GLUCOMTR-MCNC: 89 MG/DL — SIGNIFICANT CHANGE UP (ref 70–99)
GLUCOSE BLDC GLUCOMTR-MCNC: 92 MG/DL — SIGNIFICANT CHANGE UP (ref 70–99)
GLUCOSE SERPL-MCNC: 111 MG/DL — HIGH (ref 70–99)
GLUCOSE SERPL-MCNC: 85 MG/DL — SIGNIFICANT CHANGE UP (ref 70–99)
HCT VFR BLD CALC: 29.8 % — LOW (ref 39–50)
HCT VFR BLD CALC: 37.5 % — LOW (ref 39–50)
HGB BLD-MCNC: 10 G/DL — LOW (ref 13–17)
HGB BLD-MCNC: 12.5 G/DL — LOW (ref 13–17)
INR BLD: 1.12 — SIGNIFICANT CHANGE UP (ref 0.88–1.16)
MAGNESIUM SERPL-MCNC: 1.9 MG/DL — SIGNIFICANT CHANGE UP (ref 1.6–2.6)
MAGNESIUM SERPL-MCNC: 2.3 MG/DL — SIGNIFICANT CHANGE UP (ref 1.6–2.6)
MCHC RBC-ENTMCNC: 30.6 PG — SIGNIFICANT CHANGE UP (ref 27–34)
MCHC RBC-ENTMCNC: 31.1 PG — SIGNIFICANT CHANGE UP (ref 27–34)
MCHC RBC-ENTMCNC: 33.3 GM/DL — SIGNIFICANT CHANGE UP (ref 32–36)
MCHC RBC-ENTMCNC: 33.6 GM/DL — SIGNIFICANT CHANGE UP (ref 32–36)
MCV RBC AUTO: 91.9 FL — SIGNIFICANT CHANGE UP (ref 80–100)
MCV RBC AUTO: 92.5 FL — SIGNIFICANT CHANGE UP (ref 80–100)
NRBC # BLD: 0 /100 WBCS — SIGNIFICANT CHANGE UP (ref 0–0)
NRBC # BLD: 0 /100 WBCS — SIGNIFICANT CHANGE UP (ref 0–0)
PHOSPHATE SERPL-MCNC: 3.6 MG/DL — SIGNIFICANT CHANGE UP (ref 2.5–4.5)
PHOSPHATE SERPL-MCNC: 3.8 MG/DL — SIGNIFICANT CHANGE UP (ref 2.5–4.5)
PLATELET # BLD AUTO: 248 K/UL — SIGNIFICANT CHANGE UP (ref 150–400)
PLATELET # BLD AUTO: 286 K/UL — SIGNIFICANT CHANGE UP (ref 150–400)
POTASSIUM SERPL-MCNC: 4.1 MMOL/L — SIGNIFICANT CHANGE UP (ref 3.5–5.3)
POTASSIUM SERPL-MCNC: 4.2 MMOL/L — SIGNIFICANT CHANGE UP (ref 3.5–5.3)
POTASSIUM SERPL-SCNC: 4.1 MMOL/L — SIGNIFICANT CHANGE UP (ref 3.5–5.3)
POTASSIUM SERPL-SCNC: 4.2 MMOL/L — SIGNIFICANT CHANGE UP (ref 3.5–5.3)
PROT SERPL-MCNC: 5.4 G/DL — LOW (ref 6–8.3)
PROT SERPL-MCNC: 6.8 G/DL — SIGNIFICANT CHANGE UP (ref 6–8.3)
PROTHROM AB SERPL-ACNC: 13.3 SEC — SIGNIFICANT CHANGE UP (ref 10.5–13.4)
RBC # BLD: 3.22 M/UL — LOW (ref 4.2–5.8)
RBC # BLD: 4.08 M/UL — LOW (ref 4.2–5.8)
RBC # FLD: 15.1 % — HIGH (ref 10.3–14.5)
RBC # FLD: 15.3 % — HIGH (ref 10.3–14.5)
SODIUM SERPL-SCNC: 130 MMOL/L — LOW (ref 135–145)
SODIUM SERPL-SCNC: 134 MMOL/L — LOW (ref 135–145)
WBC # BLD: 22.81 K/UL — HIGH (ref 3.8–10.5)
WBC # BLD: 24.22 K/UL — HIGH (ref 3.8–10.5)
WBC # FLD AUTO: 22.81 K/UL — HIGH (ref 3.8–10.5)
WBC # FLD AUTO: 24.22 K/UL — HIGH (ref 3.8–10.5)

## 2023-04-05 RX ORDER — MULTIVIT-MIN/FERROUS GLUCONATE 9 MG/15 ML
15 LIQUID (ML) ORAL DAILY
Refills: 0 | Status: DISCONTINUED | OUTPATIENT
Start: 2023-04-05 | End: 2023-04-10

## 2023-04-05 RX ORDER — SODIUM CHLORIDE 9 MG/ML
500 INJECTION, SOLUTION INTRAVENOUS
Refills: 0 | Status: DISCONTINUED | OUTPATIENT
Start: 2023-04-05 | End: 2023-04-05

## 2023-04-05 RX ORDER — BUMETANIDE 0.25 MG/ML
2 INJECTION INTRAMUSCULAR; INTRAVENOUS ONCE
Refills: 0 | Status: COMPLETED | OUTPATIENT
Start: 2023-04-05 | End: 2023-04-05

## 2023-04-05 RX ORDER — METOPROLOL TARTRATE 50 MG
25 TABLET ORAL EVERY 8 HOURS
Refills: 0 | Status: DISCONTINUED | OUTPATIENT
Start: 2023-04-05 | End: 2023-04-07

## 2023-04-05 RX ORDER — ACETAMINOPHEN 500 MG
650 TABLET ORAL EVERY 6 HOURS
Refills: 0 | Status: DISCONTINUED | OUTPATIENT
Start: 2023-04-05 | End: 2023-04-10

## 2023-04-05 RX ORDER — ASPIRIN/CALCIUM CARB/MAGNESIUM 324 MG
81 TABLET ORAL DAILY
Refills: 0 | Status: DISCONTINUED | OUTPATIENT
Start: 2023-04-05 | End: 2023-04-10

## 2023-04-05 RX ORDER — DEXTROSE 50 % IN WATER 50 %
50 SYRINGE (ML) INTRAVENOUS
Refills: 0 | Status: DISCONTINUED | OUTPATIENT
Start: 2023-04-05 | End: 2023-04-05

## 2023-04-05 RX ORDER — OXYCODONE HYDROCHLORIDE 5 MG/1
5 TABLET ORAL EVERY 4 HOURS
Refills: 0 | Status: DISCONTINUED | OUTPATIENT
Start: 2023-04-05 | End: 2023-04-10

## 2023-04-05 RX ORDER — DEXTROSE 10 % IN WATER 10 %
250 INTRAVENOUS SOLUTION INTRAVENOUS
Refills: 0 | Status: DISCONTINUED | OUTPATIENT
Start: 2023-04-05 | End: 2023-04-05

## 2023-04-05 RX ORDER — TRAMADOL HYDROCHLORIDE 50 MG/1
25 TABLET ORAL ONCE
Refills: 0 | Status: DISCONTINUED | OUTPATIENT
Start: 2023-04-05 | End: 2023-04-05

## 2023-04-05 RX ORDER — BUMETANIDE 0.25 MG/ML
2 INJECTION INTRAMUSCULAR; INTRAVENOUS EVERY 8 HOURS
Refills: 0 | Status: DISCONTINUED | OUTPATIENT
Start: 2023-04-05 | End: 2023-04-06

## 2023-04-05 RX ORDER — COLLAGENASE CLOSTRIDIUM HIST. 250 UNIT/G
1 OINTMENT (GRAM) TOPICAL DAILY
Refills: 0 | Status: DISCONTINUED | OUTPATIENT
Start: 2023-04-05 | End: 2023-04-10

## 2023-04-05 RX ORDER — DEXTROSE 10 % IN WATER 10 %
250 INTRAVENOUS SOLUTION INTRAVENOUS ONCE
Refills: 0 | Status: COMPLETED | OUTPATIENT
Start: 2023-04-05 | End: 2023-04-05

## 2023-04-05 RX ORDER — MAGNESIUM SULFATE 500 MG/ML
2 VIAL (ML) INJECTION ONCE
Refills: 0 | Status: COMPLETED | OUTPATIENT
Start: 2023-04-05 | End: 2023-04-05

## 2023-04-05 RX ORDER — ACETAMINOPHEN 500 MG
1000 TABLET ORAL ONCE
Refills: 0 | Status: COMPLETED | OUTPATIENT
Start: 2023-04-05 | End: 2023-04-05

## 2023-04-05 RX ORDER — FENTANYL CITRATE 50 UG/ML
25 INJECTION INTRAVENOUS ONCE
Refills: 0 | Status: DISCONTINUED | OUTPATIENT
Start: 2023-04-05 | End: 2023-04-05

## 2023-04-05 RX ADMIN — OXYCODONE HYDROCHLORIDE 5 MILLIGRAM(S): 5 TABLET ORAL at 14:47

## 2023-04-05 RX ADMIN — LIDOCAINE 1 PATCH: 4 CREAM TOPICAL at 14:31

## 2023-04-05 RX ADMIN — Medication 25 GRAM(S): at 07:04

## 2023-04-05 RX ADMIN — OXYCODONE HYDROCHLORIDE 5 MILLIGRAM(S): 5 TABLET ORAL at 05:30

## 2023-04-05 RX ADMIN — OXYCODONE HYDROCHLORIDE 5 MILLIGRAM(S): 5 TABLET ORAL at 13:31

## 2023-04-05 RX ADMIN — HEPARIN SODIUM 5000 UNIT(S): 5000 INJECTION INTRAVENOUS; SUBCUTANEOUS at 21:44

## 2023-04-05 RX ADMIN — DIVALPROEX SODIUM 750 MILLIGRAM(S): 500 TABLET, DELAYED RELEASE ORAL at 09:31

## 2023-04-05 RX ADMIN — TRAMADOL HYDROCHLORIDE 25 MILLIGRAM(S): 50 TABLET ORAL at 21:45

## 2023-04-05 RX ADMIN — LIDOCAINE 1 PATCH: 4 CREAM TOPICAL at 19:01

## 2023-04-05 RX ADMIN — OXYCODONE HYDROCHLORIDE 5 MILLIGRAM(S): 5 TABLET ORAL at 19:26

## 2023-04-05 RX ADMIN — OXYCODONE HYDROCHLORIDE 5 MILLIGRAM(S): 5 TABLET ORAL at 06:30

## 2023-04-05 RX ADMIN — Medication 100 MILLIGRAM(S): at 18:12

## 2023-04-05 RX ADMIN — Medication 1000 MILLIGRAM(S): at 10:10

## 2023-04-05 RX ADMIN — Medication 650 MILLIGRAM(S): at 02:25

## 2023-04-05 RX ADMIN — Medication 400 MILLIGRAM(S): at 09:20

## 2023-04-05 RX ADMIN — DIVALPROEX SODIUM 750 MILLIGRAM(S): 500 TABLET, DELAYED RELEASE ORAL at 21:46

## 2023-04-05 RX ADMIN — PANTOPRAZOLE SODIUM 40 MILLIGRAM(S): 20 TABLET, DELAYED RELEASE ORAL at 06:13

## 2023-04-05 RX ADMIN — BUMETANIDE 2 MILLIGRAM(S): 0.25 INJECTION INTRAMUSCULAR; INTRAVENOUS at 15:31

## 2023-04-05 RX ADMIN — FENTANYL CITRATE 25 MICROGRAM(S): 50 INJECTION INTRAVENOUS at 14:42

## 2023-04-05 RX ADMIN — Medication 1 APPLICATION(S): at 11:09

## 2023-04-05 RX ADMIN — OXYCODONE HYDROCHLORIDE 5 MILLIGRAM(S): 5 TABLET ORAL at 19:36

## 2023-04-05 RX ADMIN — HEPARIN SODIUM 5000 UNIT(S): 5000 INJECTION INTRAVENOUS; SUBCUTANEOUS at 14:32

## 2023-04-05 RX ADMIN — BUMETANIDE 2 MILLIGRAM(S): 0.25 INJECTION INTRAMUSCULAR; INTRAVENOUS at 21:44

## 2023-04-05 RX ADMIN — LACOSAMIDE 100 MILLIGRAM(S): 50 TABLET ORAL at 06:12

## 2023-04-05 RX ADMIN — LACOSAMIDE 100 MILLIGRAM(S): 50 TABLET ORAL at 18:12

## 2023-04-05 RX ADMIN — Medication 25 MILLIGRAM(S): at 06:12

## 2023-04-05 RX ADMIN — Medication 5 MILLIGRAM(S): at 21:44

## 2023-04-05 RX ADMIN — LIDOCAINE 1 PATCH: 4 CREAM TOPICAL at 07:05

## 2023-04-05 RX ADMIN — Medication 25 MILLIGRAM(S): at 21:45

## 2023-04-05 RX ADMIN — Medication 650 MILLIGRAM(S): at 01:55

## 2023-04-05 RX ADMIN — Medication 81 MILLIGRAM(S): at 13:31

## 2023-04-05 RX ADMIN — CHLORHEXIDINE GLUCONATE 1 APPLICATION(S): 213 SOLUTION TOPICAL at 06:14

## 2023-04-05 RX ADMIN — Medication 25 MILLIGRAM(S): at 14:31

## 2023-04-05 RX ADMIN — TRAMADOL HYDROCHLORIDE 25 MILLIGRAM(S): 50 TABLET ORAL at 20:45

## 2023-04-05 RX ADMIN — BUMETANIDE 2 MILLIGRAM(S): 0.25 INJECTION INTRAMUSCULAR; INTRAVENOUS at 09:20

## 2023-04-05 RX ADMIN — Medication 100 MILLIGRAM(S): at 06:17

## 2023-04-05 RX ADMIN — Medication 15 MILLILITER(S): at 14:19

## 2023-04-05 RX ADMIN — FENTANYL CITRATE 25 MICROGRAM(S): 50 INJECTION INTRAVENOUS at 14:47

## 2023-04-05 RX ADMIN — Medication 250 MILLILITER(S): at 09:20

## 2023-04-05 NOTE — PROGRESS NOTE ADULT - SUBJECTIVE AND OBJECTIVE BOX
CTICU  CRITICAL  CARE  attending     Hand off received 					   Pertinent clinical, laboratory, radiographic, hemodynamic, echocardiographic, respiratory data, microbiologic data and chart were reviewed and analyzed frequently throughout the course of the day  Patient seen and examined with CTS/ SH attending at bedside  Pt is a 53yr old male with PMH HTN, type A dissection sp Dacron grafts and AV resuspension (2013), CAD sp CABG x 1 ([facility], 5/2013, SVG-RCA), seizures, admitted for surgical mgmt of progression of aneurysmal disease. Patient underwent replacement of transverse aortic arch, second stage thoracic endovascular aortic repair, aorto-axillary bypass, AV replacement (bio 23mm), and CABG x 1 (SVG-RCA) EF 75% with Dr. Pierre 3/20. Patient received 7 units pRBC, 6 FFP, 4 plt, 15 cryo, 1000 FEIBA, and uo 1300cc. Started on lasix infusion and phenylephrine, bicarb, Armaan, levo and vaso, with lactic acidosis. 3/21 L femoral HD cath placed and CVVHD started with improvement in acidosis. Pressors off by end of day. Primacor weaned overnight. Patient woke up and follows commands, exhibited facial twitching cw prior seizures per wife and ativan given followed by vEEG placement. Neurology consulted for recs given subtherapeutic AED levels. Given 2 units pRBC. 3/22 Neurology recommended dc vEEG given low suspicion for seizures. Fluid removal initiated with CVVHD. Overnight poor oxygenation requiring bronchoscopy, on laquita and vaso, D10 started for hypoglycemia. 3/23 large residuals, reglan started. CTH performed for poor mentation-no bleed. Amio given for afib. Received 1 unit pRBC and 1 plt, femoral a line removed. CVVHD stopped and diuretic initiated with good response. Primacor turned off. 3/25 new TLC and R cordis with swan placed, tolerated cpap. D10 for hypoglycemia. Underwent aquaphoresis via new LIJ HD cath. Low grade fevers. Overnight swan dc'd. 3/27 New RIJ HDC placed for malfunctioning LIJ, underwent HD with 3.5L removed. Junctional rhythm overnight. 3/29 extubated to HFNC, passed SLP. 3/30 placed on BIPAP for tachypnea, underwent bronchoscopy, new LIJ TLC placed for access, HD performed. TF via dobhoff. L pigtail placed with 700cc out. 4/1 Underwent HD followed by back on aquaphoresis. Tolerating PO diet. 4/3 given 1 unit pRBC for orthostasis. 4/4 started on ancef for L knee ulcer.       FAMILY HISTORY:  No pertinent family history in first degree relatives  PAST MEDICAL & SURGICAL HISTORY:  HTN (hypertension)  Aortic dissection  CAD (coronary artery disease)  Seizure disorder  S/P aortic bifurcation bypass graft  S/P CABG x 1        Patient is a 53y old  Male who presents with a chief complaint of expanding aortic arch and descending aortic aneurysm.    14 system review was unremarkable    Vital signs, hemodynamic and respiratory parameters were reviewed from the bedside nursing flowsheet.  ICU Vital Signs Last 24 Hrs  T(C): 36.7 (05 Apr 2023 05:01), Max: 37.3 (04 Apr 2023 09:10)  T(F): 98.1 (05 Apr 2023 05:01), Max: 99.1 (04 Apr 2023 09:10)  HR: 97 (05 Apr 2023 08:00) (92 - 118)  BP: 154/88 (05 Apr 2023 08:00) (111/76 - 163/98)  BP(mean): 113 (05 Apr 2023 08:00) (87 - 122)  ABP: --  ABP(mean): --  RR: 20 (05 Apr 2023 08:00) (14 - 22)  SpO2: 92% (05 Apr 2023 08:00) (70% - 100%)    O2 Parameters below as of 05 Apr 2023 08:00  Patient On (Oxygen Delivery Method): room air          Adult Advanced Hemodynamics Last 24 Hrs  CVP(mm Hg): 6 (04 Apr 2023 09:05) (6 - 8)  CVP(cm H2O): --  CO: --  CI: --  PA: --  PA(mean): --  PCWP: --  SVR: --  SVRI: --  PVR: --  PVRI: --, ABG - ( 03 Apr 2023 14:49 )  pH, Arterial: 7.48  pH, Blood: x     /  pCO2: 32    /  pO2: 79    / HCO3: 24    / Base Excess: 0.9   /  SaO2: 97.7                Intake and output was reviewed and the fluid balance was calculated  Daily     Daily   I&O's Summary    04 Apr 2023 07:01  -  05 Apr 2023 07:00  --------------------------------------------------------  IN: 208 mL / OUT: 920 mL / NET: -712 mL    05 Apr 2023 07:01  -  05 Apr 2023 08:48  --------------------------------------------------------  IN: 0 mL / OUT: 0 mL / NET: 0 mL        All lines and drain sites were assessed  Glycemic trend was reviewed    POCT Blood Glucose.: 56 mg/dL (05 Apr 2023 07:44)      Neuro: follows commands  HEENT: nc  Heart: s1 s2   Lungs: clear bl  Abdomen: soft nt nd  Extremities: 2+dp    Tubes:  L pigtail      labs  CBC Full  -  ( 05 Apr 2023 00:50 )  WBC Count : 22.81 K/uL  RBC Count : 3.22 M/uL  Hemoglobin : 10.0 g/dL  Hematocrit : 29.8 %  Platelet Count - Automated : 248 K/uL  Mean Cell Volume : 92.5 fl  Mean Cell Hemoglobin : 31.1 pg  Mean Cell Hemoglobin Concentration : 33.6 gm/dL  Auto Neutrophil # : x  Auto Lymphocyte # : x  Auto Monocyte # : x  Auto Eosinophil # : x  Auto Basophil # : x  Auto Neutrophil % : x  Auto Lymphocyte % : x  Auto Monocyte % : x  Auto Eosinophil % : x  Auto Basophil % : x    04-05    134<L>  |  104  |  43<H>  ----------------------------<  111<H>  4.1   |  24  |  3.39<H>    Ca    8.3<L>      05 Apr 2023 00:50  Phos  3.8     04-05  Mg     1.9     04-05    TPro  5.4<L>  /  Alb  2.1<L>  /  TBili  0.5  /  DBili  x   /  AST  21  /  ALT  10  /  AlkPhos  78  04-05    PT/INR - ( 04 Apr 2023 01:35 )   PT: 14.6 sec;   INR: 1.22          PTT - ( 04 Apr 2023 01:35 )  PTT:30.7 sec  The current medications were reviewed   MEDICATIONS  (STANDING):  aspirin  chewable 81 milliGRAM(s) Oral daily  buMETAnide Injectable 2 milliGRAM(s) IV Push every 8 hours  ceFAZolin   IVPB 1000 milliGRAM(s) IV Intermittent every 12 hours  chlorhexidine 2% Cloths 1 Application(s) Topical daily  collagenase Ointment 1 Application(s) Topical daily  dextrose 5%. 1000 milliLiter(s) (50 mL/Hr) IV Continuous <Continuous>  dextrose 5%. 1000 milliLiter(s) (100 mL/Hr) IV Continuous <Continuous>  dextrose 50% Injectable 50 milliLiter(s) IV Push every 15 minutes  dextrose 50% Injectable 25 milliLiter(s) IV Push every 15 minutes  divalproex  milliGRAM(s) Oral <User Schedule>  glucagon  Injectable 1 milliGRAM(s) IntraMuscular once  heparin   Injectable 5000 Unit(s) SubCutaneous every 8 hours  insulin lispro (ADMELOG) corrective regimen sliding scale   SubCutaneous Before meals and at bedtime  lacosamide 100 milliGRAM(s) Oral two times a day  lidocaine   4% Patch 1 Patch Transdermal every 24 hours  lidocaine 2% Jelly 10 milliLiter(s) IntraUrethral once  melatonin 5 milliGRAM(s) Oral at bedtime  metoprolol tartrate 25 milliGRAM(s) Oral every 8 hours  multivitamin/minerals/iron Oral Solution (CENTRUM) 15 milliLiter(s) Oral daily  pantoprazole    Tablet 40 milliGRAM(s) Oral before breakfast  sodium chloride 0.9%. 1000 milliLiter(s) (10 mL/Hr) IV Continuous <Continuous>    MEDICATIONS  (PRN):  acetaminophen     Tablet .. 650 milliGRAM(s) Oral every 6 hours PRN Mild Pain (1 - 3)  dextrose Oral Gel 15 Gram(s) Oral once PRN Blood Glucose LESS THAN 70 milliGRAM(s)/deciliter  oxyCODONE    IR 5 milliGRAM(s) Oral every 6 hours PRN Moderate Pain (4 - 6)      Assessment/Plan:  53yr old male with PMH HTN, type A dissection sp Dacron grafts and AV resuspension (2013), CAD sp CABG x 1 ([facility], 5/2013, SVG-RCA), seizures, admitted for surgical mgmt of progression of aneurysmal disease.     POD16 replacement of transverse aortic arch, second stage thoracic endovascular aortic repair, aorto-axillary bypass, AV replacement (bio 23mm), and [facility]  NC prn  ATN, hold AQ  CXR looks congested, POCUS and bumex push now  On Ancef for L knee ulcer  Continue AEDs  Acute post operative anemia-trend H/H  Thrombocytopenia-improving  Bblocker, uptitrate prn  Replete lytes prn  Monitor CT output  Diet as tolerated  GI/DVT PPX  Bowel Regimen  Pain control  OOB with PT  Close hemodynamic, ventilatory and drain monitoring and management per post op routine  Monitor for arrhythmias and monitor parameters for organ perfusion  Beta blockade not administered due to hemodynamic instability and bradycardia  Monitor neurologic status  Head of the bed should remain elevated to 45 deg   Chest PT and IS will be encouraged  Monitor adequacy of oxygenation and ventilation and attempt to wean oxygen  Antibiotic regimen will be tailored to the clinical, laboratory and microbiologic data  Nutritional goals will be met using po eventually, ensure adequate caloric intake and monitor the same  Stress ulcer and VTE prophylaxis will be achieved    Glycemic control is satisfactory  Electrolytes have been repleted as necessary and wound care has been carried out   Pain control has been achieved.   Aggressive physical therapy and early mobility and ambulation goals will be met   The family was updated about the course and plan.    CRITICAL CARE TIME personally provided by me  in evaluation and management, reassessments, review and interpretation of labs and x-rays, ventilator and hemodynamic management, formulating a plan and coordinating care: ___35___ MIN.  Time does not include procedural time.    CTICU ATTENDING     					  Alexx Brooks MD

## 2023-04-05 NOTE — PROGRESS NOTE ADULT - SUBJECTIVE AND OBJECTIVE BOX
--------------------------------------------------------------------------------  Chief Complaint: ESRD/Ongoing hemodialysis requirement    24 hour events/subjective:  Seen this AM doing well, creatinine starting to improve today with some increased clearance. CXR looks a bit worse. Discussed with CTICU that patient should get diuretics today.    PAST HISTORY  --------------------------------------------------------------------------------  No significant changes to PMH, PSH, FHx, SHx, unless otherwise noted    ALLERGIES & MEDICATIONS  --------------------------------------------------------------------------------  Allergies    No Known Allergies    Intolerances  Standing Inpatient Medications  aspirin  chewable 81 milliGRAM(s) Oral daily  buMETAnide Injectable 2 milliGRAM(s) IV Push every 8 hours  ceFAZolin   IVPB 1000 milliGRAM(s) IV Intermittent every 12 hours  chlorhexidine 2% Cloths 1 Application(s) Topical daily  collagenase Ointment 1 Application(s) Topical daily  dextrose 5%. 1000 milliLiter(s) IV Continuous <Continuous>  dextrose 5%. 1000 milliLiter(s) IV Continuous <Continuous>  dextrose 50% Injectable 50 milliLiter(s) IV Push every 15 minutes  dextrose 50% Injectable 25 milliLiter(s) IV Push every 15 minutes  divalproex  milliGRAM(s) Oral <User Schedule>  glucagon  Injectable 1 milliGRAM(s) IntraMuscular once  heparin   Injectable 5000 Unit(s) SubCutaneous every 8 hours  insulin lispro (ADMELOG) corrective regimen sliding scale   SubCutaneous Before meals and at bedtime  lacosamide 100 milliGRAM(s) Oral two times a day  lidocaine   4% Patch 1 Patch Transdermal every 24 hours  lidocaine 2% Jelly 10 milliLiter(s) IntraUrethral once  melatonin 5 milliGRAM(s) Oral at bedtime  metoprolol tartrate 25 milliGRAM(s) Oral every 8 hours  multivitamin/minerals/iron Oral Solution (CENTRUM) 15 milliLiter(s) Oral daily  pantoprazole    Tablet 40 milliGRAM(s) Oral before breakfast  sodium chloride 0.9%. 1000 milliLiter(s) IV Continuous <Continuous>    PRN Inpatient Medications  acetaminophen     Tablet .. 650 milliGRAM(s) Oral every 6 hours PRN  dextrose Oral Gel 15 Gram(s) Oral once PRN  oxyCODONE    IR 5 milliGRAM(s) Oral every 6 hours PRN      REVIEW OF SYSTEMS  --------------------------------------------------------------------------------  All other systems were reviewed and are negative, except as noted.    VITALS/PHYSICAL EXAM  --------------------------------------------------------------------------------  T(C): 36.7 (04-05-23 @ 05:01), Max: 37 (04-04-23 @ 13:46)  HR: 97 (04-05-23 @ 08:00) (92 - 118)  BP: 154/88 (04-05-23 @ 08:00) (111/76 - 163/98)  RR: 20 (04-05-23 @ 08:00) (14 - 22)  SpO2: 92% (04-05-23 @ 08:00) (70% - 100%)  Wt(kg): --  Drug Dosing Weight  Height (cm): 182.9 (20 Mar 2023 07:23)  Weight (kg): 77.1 (20 Mar 2023 07:23)  BMI (kg/m2): 23 (20 Mar 2023 07:23)  BSA (m2): 1.99 (20 Mar 2023 07:23)        04-04-23 @ 07:01  -  04-05-23 @ 07:00  --------------------------------------------------------  IN: 208 mL / OUT: 920 mL / NET: -712 mL    04-05-23 @ 07:01  -  04-05-23 @ 09:48  --------------------------------------------------------  IN: 0 mL / OUT: 0 mL / NET: 0 mL      Physical Exam:  	Gen: NAD, well-appearing  	HEENT: PERRL, supple neck, clear oropharynx  	Pulm: CTA B/L  	CV: RRR, S1S2; no rub  	Back: No spinal or CVA tenderness; no sacral edema  	Abd: +BS, soft, nontender/nondistended  	: No suprapubic tenderness  	UE: Warm, FROM, no clubbing, intact strength; no edema; no asterixis  	LE: Warm, FROM, no clubbing, intact strength; no edema  	Neuro: No focal deficits, intact gait  	Psych: Normal affect and mood  	Skin: Warm, without rashes  	Vascular access:    LABS/STUDIES  --------------------------------------------------------------------------------              10.0   22.81 >-----------<  248      [04-05-23 @ 00:50]              29.8     134  |  104  |  43  ----------------------------<  111      [04-05-23 @ 00:50]  4.1   |  24  |  3.39        Ca     8.3     [04-05-23 @ 00:50]      Mg     1.9     [04-05-23 @ 00:50]      Phos  3.8     [04-05-23 @ 00:50]    TPro  5.4  /  Alb  2.1  /  TBili  0.5  /  DBili  x   /  AST  21  /  ALT  10  /  AlkPhos  78  [04-05-23 @ 00:50]    PT/INR: PT 14.6 , INR 1.22       [04-04-23 @ 01:35]  PTT: 30.7       [04-04-23 @ 01:35]      TSH 0.626      [03-09-23 @ 12:54]  Lipid: chol 130, TG 85, HDL 43, LDL --      [03-09-23 @ 10:04]    HBsAb Nonreact      [03-30-23 @ 12:02]  HBsAg Nonreact      [03-09-23 @ 12:54]  HCV 0.29, Nonreact      [03-30-23 @ 12:02]      RADIOLOGY:  -------------------------------------------------------------------------------------- --------------------------------------------------------------------------------  Chief Complaint: ESRD/Ongoing hemodialysis requirement    24 hour events/subjective:  Seen this AM doing well, creatinine starting to improve today with some increased clearance. CXR looks a bit worse. Discussed with CTICU that patient should get diuretics today.    PAST HISTORY  --------------------------------------------------------------------------------  No significant changes to PMH, PSH, FHx, SHx, unless otherwise noted    ALLERGIES & MEDICATIONS  --------------------------------------------------------------------------------  Allergies    No Known Allergies    Intolerances  Standing Inpatient Medications  aspirin  chewable 81 milliGRAM(s) Oral daily  buMETAnide Injectable 2 milliGRAM(s) IV Push every 8 hours  ceFAZolin   IVPB 1000 milliGRAM(s) IV Intermittent every 12 hours  chlorhexidine 2% Cloths 1 Application(s) Topical daily  collagenase Ointment 1 Application(s) Topical daily  dextrose 5%. 1000 milliLiter(s) IV Continuous <Continuous>  dextrose 5%. 1000 milliLiter(s) IV Continuous <Continuous>  dextrose 50% Injectable 50 milliLiter(s) IV Push every 15 minutes  dextrose 50% Injectable 25 milliLiter(s) IV Push every 15 minutes  divalproex  milliGRAM(s) Oral <User Schedule>  glucagon  Injectable 1 milliGRAM(s) IntraMuscular once  heparin   Injectable 5000 Unit(s) SubCutaneous every 8 hours  insulin lispro (ADMELOG) corrective regimen sliding scale   SubCutaneous Before meals and at bedtime  lacosamide 100 milliGRAM(s) Oral two times a day  lidocaine   4% Patch 1 Patch Transdermal every 24 hours  lidocaine 2% Jelly 10 milliLiter(s) IntraUrethral once  melatonin 5 milliGRAM(s) Oral at bedtime  metoprolol tartrate 25 milliGRAM(s) Oral every 8 hours  multivitamin/minerals/iron Oral Solution (CENTRUM) 15 milliLiter(s) Oral daily  pantoprazole    Tablet 40 milliGRAM(s) Oral before breakfast  sodium chloride 0.9%. 1000 milliLiter(s) IV Continuous <Continuous>    PRN Inpatient Medications  acetaminophen     Tablet .. 650 milliGRAM(s) Oral every 6 hours PRN  dextrose Oral Gel 15 Gram(s) Oral once PRN  oxyCODONE    IR 5 milliGRAM(s) Oral every 6 hours PRN      REVIEW OF SYSTEMS  --------------------------------------------------------------------------------  All other systems were reviewed and are negative, except as noted.    VITALS/PHYSICAL EXAM  --------------------------------------------------------------------------------  T(C): 36.7 (04-05-23 @ 05:01), Max: 37 (04-04-23 @ 13:46)  HR: 97 (04-05-23 @ 08:00) (92 - 118)  BP: 154/88 (04-05-23 @ 08:00) (111/76 - 163/98)  RR: 20 (04-05-23 @ 08:00) (14 - 22)  SpO2: 92% (04-05-23 @ 08:00) (70% - 100%)  Wt(kg): --  Drug Dosing Weight  Height (cm): 182.9 (20 Mar 2023 07:23)  Weight (kg): 77.1 (20 Mar 2023 07:23)  BMI (kg/m2): 23 (20 Mar 2023 07:23)  BSA (m2): 1.99 (20 Mar 2023 07:23)        04-04-23 @ 07:01  -  04-05-23 @ 07:00  --------------------------------------------------------  IN: 208 mL / OUT: 920 mL / NET: -712 mL    04-05-23 @ 07:01  -  04-05-23 @ 09:48  --------------------------------------------------------  IN: 0 mL / OUT: 0 mL / NET: 0 mL    PHYSICAL EXAM:  GENERAL: NAD   NECK: supple, No JVD  CHEST/LUNG: CTLA B/L, on RA  HEART: normal S1S2, RRR  ABDOMEN: Soft, Nontender, +BS, No flank tenderness bilateral  EXTREMITIES: No clubbing, cyanosis, or edema   NEUROLOGY: Asnwers questions, no focal neurological deficit  ACCESS: Rt IJ S/Q HD Cath c/d/i      LABS/STUDIES  --------------------------------------------------------------------------------              10.0   22.81 >-----------<  248      [04-05-23 @ 00:50]              29.8     134  |  104  |  43  ----------------------------<  111      [04-05-23 @ 00:50]  4.1   |  24  |  3.39        Ca     8.3     [04-05-23 @ 00:50]      Mg     1.9     [04-05-23 @ 00:50]      Phos  3.8     [04-05-23 @ 00:50]    TPro  5.4  /  Alb  2.1  /  TBili  0.5  /  DBili  x   /  AST  21  /  ALT  10  /  AlkPhos  78  [04-05-23 @ 00:50]    PT/INR: PT 14.6 , INR 1.22       [04-04-23 @ 01:35]  PTT: 30.7       [04-04-23 @ 01:35]      TSH 0.626      [03-09-23 @ 12:54]  Lipid: chol 130, TG 85, HDL 43, LDL --      [03-09-23 @ 10:04]    HBsAb Nonreact      [03-30-23 @ 12:02]  HBsAg Nonreact      [03-09-23 @ 12:54]  HCV 0.29, Nonreact      [03-30-23 @ 12:02]      RADIOLOGY:  --------------------------------------------------------------------------------------

## 2023-04-05 NOTE — ADVANCED PRACTICE NURSE CONSULT - ASSESSMENT
Patient is a 53y old  Male who presents with a chief complaint of expanding aortic arch and descending aortic aneurysm. Wound of known origin over right patella, per staff it has gradually gotten bigger. Site measures 12 x 7 with scattered soft eschar to edges, minimal slough. Pt denies problems with ROM, no s/s of infection noted. De-roofed blisters noted over left upper arm, no s/s of infection to these sites.

## 2023-04-05 NOTE — PROGRESS NOTE ADULT - ASSESSMENT
54 yo M with non-oliguric iATN i/s/o replacement of aortic arch and TAVR on 3/20- prolonged OR time and massive transfusion and other blood products. Initiated on CVVHD (3/21-3/24) for clearance and volume. Started on diuresis on 3/24 with good response but remained vol overloaded, so started iHD 3/27 for volume and intermittent AQ on 3/31 now back on diuretic challenge    Assessment/Plan:   #Non-oliguric iATN due to intra-op hemodynamic changes, dialysis dependent  BCr 1.2, eGFR 67 (3/9)  UA (3/25) w/ proteinuria and mod blood w/o RBCs.  Showing some ATN recovery, U/O improving  CVVHD (3/21- 3/24)  AQ (3/31-4/2)    Plan:  Continue with bumex 2mg Q8h for today, will assess for H D eneds on 4/6  strict I&Os  BMP per icu protocol  Maintain MAP >70 for renal perfusion     #Anemia  -Check iron profile and ferritin    Discussed w/ CTICU team   52 yo M with non-oliguric iATN i/s/o replacement of aortic arch and TAVR on 3/20- prolonged OR time and massive transfusion and other blood products. Initiated on CVVHD (3/21-3/24) for clearance and volume. Started on diuresis on 3/24 with good response but remained vol overloaded, so started iHD 3/27 for volume and intermittent AQ on 3/31 now back on diuretic challenge    Assessment/Plan:   #Non-oliguric iATN due to intra-op hemodynamic changes, dialysis dependent  BCr 1.2, eGFR 67 (3/9)  UA (3/25) w/ proteinuria and mod blood w/o RBCs.  Showing some ATN recovery, U/O improving  CVVHD (3/21- 3/24)  AQ (3/31-4/2)    Plan:  Continue with bumex 2mg Q8h for today, will assess for HD needs on 4/6  strict I&Os  BMP per icu protocol  Maintain MAP >70 for renal perfusion     #Anemia  -Check iron profile and ferritin    Discussed w/ CTICU team

## 2023-04-05 NOTE — ADVANCED PRACTICE NURSE CONSULT - RECOMMEDATIONS
REcommend continueing Collagenase to left knee daily, Triad ointment to de-roofed blisters. Spoke with KUN Valles and LILIA Rodriguez.

## 2023-04-05 NOTE — PROGRESS NOTE ADULT - SUBJECTIVE AND OBJECTIVE BOX
CTICU  CRITICAL  CARE  attending     Hand off received 					   Pertinent clinical, laboratory, radiographic, hemodynamic, echocardiographic, respiratory data, microbiologic data and chart were reviewed and analyzed frequently throughout the course of the day and night            52 year old male with HTN, type A aortic dissection s/p Dacron grafts, AV resuspension in 2013,CAD s/p CABG x 1 SVG to RCA (5/2013 with Dr. Medina), seizure disorder (last episode on 7/4/22).  He is a current every day marijuana user  CTA chest, abdomen and pelvis 11/30/22: Status post graft repair of the ascending aorta and portion of aortic arch.  Dissecting aneurysm of the descending thoracic aorta (measuring up to 5.7 cm) and abdominal aorta (3.5 cm infrarenal), similar to the MR angiogram of 9/19/2022.  Nonocclusive dissection flap present within the innominate artery, aneurysmal right subclavian artery and proximal right common carotid artery as well as aneurysmal left common iliac artery.    He was referred by Dr. Jacqueline Gonsales.  He ws screened for Triomphe study but did not qualify.    S/P AVR  Aortic arch replacement  S/P CABG (SVG to RCA).  S/P Aorto left axillary by pass.  S/P TEVAR to descending aorta.      FAMILY HISTORY:  No pertinent family history in first degree relatives    PAST MEDICAL & SURGICAL HISTORY:  HTN (hypertension)  Aortic dissection  CAD (coronary artery disease)  Seizure disorder  S/P aortic bifurcation bypass graft  S/P CABG x 1          14 system review was unremarkable    Vital signs, hemodynamic and respiratory parameters were reviewed from the bedside nursing flow sheet.  ICU Vital Signs Last 24 Hrs  T(C): 36.8 (05 Apr 2023 21:44), Max: 36.9 (05 Apr 2023 01:01)  T(F): 98.2 (05 Apr 2023 21:44), Max: 98.5 (05 Apr 2023 01:01)  HR: 103 (05 Apr 2023 21:00) (95 - 113)  BP: 141/91 (05 Apr 2023 21:00) (123/88 - 162/90)  BP(mean): 112 (05 Apr 2023 21:00) (93 - 115)  ABP: --  ABP(mean): --  RR: 23 (05 Apr 2023 21:00) (14 - 29)  SpO2: 96% (05 Apr 2023 21:00) (92% - 98%)    O2 Parameters below as of 05 Apr 2023 21:00  Patient On (Oxygen Delivery Method): room air          Adult Advanced Hemodynamics Last 24 Hrs  CVP(mm Hg): --  CVP(cm H2O): --  CO: --  CI: --  PA: --  PA(mean): --  PCWP: --  SVR: --  SVRI: --  PVR: --  PVRI: --,     Intake and output was reviewed and the fluid balance was calculated  Daily     Daily   I&O's Summary    04 Apr 2023 07:01  -  05 Apr 2023 07:00  --------------------------------------------------------  IN: 208 mL / OUT: 920 mL / NET: -712 mL    05 Apr 2023 07:01  -  05 Apr 2023 22:02  --------------------------------------------------------  IN: 540 mL / OUT: 2635 mL / NET: -2095 mL          Neuro: Episodes of confusion last night.   Neck: No JVD.  CVS: S1, S2, No S3.  Lungs: Good air entry bilaterally.  Abd: Soft. No tenderness. + Bowel sounds.  Vascular: + DP/PT.  Extremities: LLE cellulitis at SVG harvest site.   Lymphatic: Normal.  Skin: No abnormalities.      labs  CBC Full  -  ( 05 Apr 2023 14:26 )  WBC Count : 24.22 K/uL  RBC Count : 4.08 M/uL  Hemoglobin : 12.5 g/dL  Hematocrit : 37.5 %  Platelet Count - Automated : 286 K/uL  Mean Cell Volume : 91.9 fl  Mean Cell Hemoglobin : 30.6 pg  Mean Cell Hemoglobin Concentration : 33.3 gm/dL  Auto Neutrophil # : x  Auto Lymphocyte # : x  Auto Monocyte # : x  Auto Eosinophil # : x  Auto Basophil # : x  Auto Neutrophil % : x  Auto Lymphocyte % : x  Auto Monocyte % : x  Auto Eosinophil % : x  Auto Basophil % : x    04-05    130<L>  |  107  |  35<H>  ----------------------------<  85  4.2   |  25  |  3.19<H>    Ca    9.0      05 Apr 2023 14:26  Phos  3.6     04-05  Mg     2.3     04-05    TPro  6.8  /  Alb  2.7<L>  /  TBili  0.6  /  DBili  x   /  AST  26  /  ALT  10  /  AlkPhos  95  04-05    PT/INR - ( 05 Apr 2023 14:26 )   PT: 13.3 sec;   INR: 1.12          PTT - ( 05 Apr 2023 14:26 )  PTT:23.7 sec  The current medications were reviewed   MEDICATIONS  (STANDING):  aspirin  chewable 81 milliGRAM(s) Oral daily  buMETAnide Injectable 2 milliGRAM(s) IV Push every 8 hours  ceFAZolin   IVPB 1000 milliGRAM(s) IV Intermittent every 12 hours  chlorhexidine 2% Cloths 1 Application(s) Topical daily  collagenase Ointment 1 Application(s) Topical daily  dextrose 5%. 1000 milliLiter(s) (50 mL/Hr) IV Continuous <Continuous>  dextrose 5%. 1000 milliLiter(s) (100 mL/Hr) IV Continuous <Continuous>  dextrose 50% Injectable 50 milliLiter(s) IV Push every 15 minutes  dextrose 50% Injectable 25 milliLiter(s) IV Push every 15 minutes  divalproex  milliGRAM(s) Oral <User Schedule>  glucagon  Injectable 1 milliGRAM(s) IntraMuscular once  heparin   Injectable 5000 Unit(s) SubCutaneous every 8 hours  insulin lispro (ADMELOG) corrective regimen sliding scale   SubCutaneous Before meals and at bedtime  lacosamide 100 milliGRAM(s) Oral two times a day  lidocaine   4% Patch 1 Patch Transdermal every 24 hours  lidocaine 2% Jelly 10 milliLiter(s) IntraUrethral once  melatonin 5 milliGRAM(s) Oral at bedtime  metoprolol tartrate 25 milliGRAM(s) Oral every 8 hours  multivitamin/minerals/iron Oral Solution (CENTRUM) 15 milliLiter(s) Oral daily  pantoprazole    Tablet 40 milliGRAM(s) Oral before breakfast  sodium chloride 0.9%. 1000 milliLiter(s) (10 mL/Hr) IV Continuous <Continuous>    MEDICATIONS  (PRN):  acetaminophen     Tablet .. 650 milliGRAM(s) Oral every 6 hours PRN Mild Pain (1 - 3)  dextrose Oral Gel 15 Gram(s) Oral once PRN Blood Glucose LESS THAN 70 milliGRAM(s)/deciliter  oxyCODONE    IR 5 milliGRAM(s) Oral every 4 hours PRN Moderate Pain (4 - 6)          53 year old  Male with expanding aortic arch and descending aortic aneurysm   S/P AVR  Aortic arch replacement  S/P CABG (SVG to RCA).  S/P Aorto left axillary by pass.  S/P TEVAR to descending aorta.  Long postoperative course complicated by vent dependent respiratory failure, renal failure, thrombocytopenia, serratia pneumonia, bilateral pleural effusions requiring bilateral pigtails.  Right pigtail drained 950cc today.  Leucocytosis.  Hemodynamically stable.  Good oxygenation.  Fair urine out put.        My plan includes :  IV antibiotic Rx.  Gentle Diuresis.   Anti seizure Rx.  Statin and Betablocker.  Close hemodynamic, ventilatory and drain monitoring and management  Monitor for arrhythmias and monitor parameters for organ perfusion  Monitor neurologic status  Monitor renal function.  Head of the bed should remain elevated to 45 deg .   Chest PT and IS will be encouraged  Monitor adequacy of oxygenation and ventilation and attempt to wean oxygen  Nutritional goals will be met using po eventually , ensure adequate caloric intake and monitor the same  Stress ulcer and VTE prophylaxis will be achieved    Glycemic control is satisfactory  Electrolytes have been repleted as necessary and wound care has been carried out. Pain control has been achieved.   Aggressive physical therapy and early mobility and ambulation goals will be met   The family was updated about the course and plan  CRITICAL CARE TIME SPENT in evaluation and management, reassessments, review and interpretation of labs and x-rays, ventilator and hemodynamic management, formulating a plan and coordinating care: ___30____ MIN.  Time does not include procedural time.  CTICU ATTENDING     					    José Miguel Torres MD

## 2023-04-06 LAB
ALBUMIN SERPL ELPH-MCNC: 2.3 G/DL — LOW (ref 3.3–5)
ALP SERPL-CCNC: 79 U/L — SIGNIFICANT CHANGE UP (ref 40–120)
ALT FLD-CCNC: 7 U/L — LOW (ref 10–45)
ANION GAP SERPL CALC-SCNC: 9 MMOL/L — SIGNIFICANT CHANGE UP (ref 5–17)
APTT BLD: 31.1 SEC — SIGNIFICANT CHANGE UP (ref 27.5–35.5)
AST SERPL-CCNC: 19 U/L — SIGNIFICANT CHANGE UP (ref 10–40)
BILIRUB SERPL-MCNC: 0.4 MG/DL — SIGNIFICANT CHANGE UP (ref 0.2–1.2)
BUN SERPL-MCNC: 38 MG/DL — HIGH (ref 7–23)
CALCIUM SERPL-MCNC: 8.5 MG/DL — SIGNIFICANT CHANGE UP (ref 8.4–10.5)
CHLORIDE SERPL-SCNC: 101 MMOL/L — SIGNIFICANT CHANGE UP (ref 96–108)
CO2 SERPL-SCNC: 26 MMOL/L — SIGNIFICANT CHANGE UP (ref 22–31)
CREAT SERPL-MCNC: 3.01 MG/DL — HIGH (ref 0.5–1.3)
EGFR: 24 ML/MIN/1.73M2 — LOW
GLUCOSE BLDC GLUCOMTR-MCNC: 70 MG/DL — SIGNIFICANT CHANGE UP (ref 70–99)
GLUCOSE BLDC GLUCOMTR-MCNC: 80 MG/DL — SIGNIFICANT CHANGE UP (ref 70–99)
GLUCOSE BLDC GLUCOMTR-MCNC: 84 MG/DL — SIGNIFICANT CHANGE UP (ref 70–99)
GLUCOSE BLDC GLUCOMTR-MCNC: 88 MG/DL — SIGNIFICANT CHANGE UP (ref 70–99)
GLUCOSE SERPL-MCNC: 76 MG/DL — SIGNIFICANT CHANGE UP (ref 70–99)
HCT VFR BLD CALC: 30.7 % — LOW (ref 39–50)
HGB BLD-MCNC: 10 G/DL — LOW (ref 13–17)
INR BLD: 1.24 — HIGH (ref 0.88–1.16)
MAGNESIUM SERPL-MCNC: 2 MG/DL — SIGNIFICANT CHANGE UP (ref 1.6–2.6)
MCHC RBC-ENTMCNC: 30.4 PG — SIGNIFICANT CHANGE UP (ref 27–34)
MCHC RBC-ENTMCNC: 32.6 GM/DL — SIGNIFICANT CHANGE UP (ref 32–36)
MCV RBC AUTO: 93.3 FL — SIGNIFICANT CHANGE UP (ref 80–100)
NRBC # BLD: 0 /100 WBCS — SIGNIFICANT CHANGE UP (ref 0–0)
PHOSPHATE SERPL-MCNC: 4.4 MG/DL — SIGNIFICANT CHANGE UP (ref 2.5–4.5)
PLATELET # BLD AUTO: 293 K/UL — SIGNIFICANT CHANGE UP (ref 150–400)
POTASSIUM SERPL-MCNC: 4 MMOL/L — SIGNIFICANT CHANGE UP (ref 3.5–5.3)
POTASSIUM SERPL-SCNC: 4 MMOL/L — SIGNIFICANT CHANGE UP (ref 3.5–5.3)
PROT SERPL-MCNC: 5.6 G/DL — LOW (ref 6–8.3)
PROTHROM AB SERPL-ACNC: 14.8 SEC — HIGH (ref 10.5–13.4)
RBC # BLD: 3.29 M/UL — LOW (ref 4.2–5.8)
RBC # FLD: 14.6 % — HIGH (ref 10.3–14.5)
SODIUM SERPL-SCNC: 136 MMOL/L — SIGNIFICANT CHANGE UP (ref 135–145)
WBC # BLD: 22.79 K/UL — HIGH (ref 3.8–10.5)
WBC # FLD AUTO: 22.79 K/UL — HIGH (ref 3.8–10.5)

## 2023-04-06 RX ORDER — FENTANYL CITRATE 50 UG/ML
25 INJECTION INTRAVENOUS ONCE
Refills: 0 | Status: DISCONTINUED | OUTPATIENT
Start: 2023-04-06 | End: 2023-04-06

## 2023-04-06 RX ORDER — LIDOCAINE HCL 20 MG/ML
10 VIAL (ML) INJECTION ONCE
Refills: 0 | Status: COMPLETED | OUTPATIENT
Start: 2023-04-06 | End: 2023-04-06

## 2023-04-06 RX ORDER — ONDANSETRON 8 MG/1
4 TABLET, FILM COATED ORAL ONCE
Refills: 0 | Status: COMPLETED | OUTPATIENT
Start: 2023-04-06 | End: 2023-04-06

## 2023-04-06 RX ORDER — ALPRAZOLAM 0.25 MG
0.5 TABLET ORAL ONCE
Refills: 0 | Status: DISCONTINUED | OUTPATIENT
Start: 2023-04-06 | End: 2023-04-06

## 2023-04-06 RX ORDER — BUMETANIDE 0.25 MG/ML
2 INJECTION INTRAMUSCULAR; INTRAVENOUS DAILY
Refills: 0 | Status: DISCONTINUED | OUTPATIENT
Start: 2023-04-06 | End: 2023-04-08

## 2023-04-06 RX ADMIN — OXYCODONE HYDROCHLORIDE 5 MILLIGRAM(S): 5 TABLET ORAL at 01:51

## 2023-04-06 RX ADMIN — OXYCODONE HYDROCHLORIDE 5 MILLIGRAM(S): 5 TABLET ORAL at 04:50

## 2023-04-06 RX ADMIN — OXYCODONE HYDROCHLORIDE 5 MILLIGRAM(S): 5 TABLET ORAL at 23:00

## 2023-04-06 RX ADMIN — Medication 15 MILLILITER(S): at 15:22

## 2023-04-06 RX ADMIN — HEPARIN SODIUM 5000 UNIT(S): 5000 INJECTION INTRAVENOUS; SUBCUTANEOUS at 13:02

## 2023-04-06 RX ADMIN — Medication 0.5 MILLIGRAM(S): at 21:59

## 2023-04-06 RX ADMIN — HEPARIN SODIUM 5000 UNIT(S): 5000 INJECTION INTRAVENOUS; SUBCUTANEOUS at 05:33

## 2023-04-06 RX ADMIN — CHLORHEXIDINE GLUCONATE 1 APPLICATION(S): 213 SOLUTION TOPICAL at 05:33

## 2023-04-06 RX ADMIN — LACOSAMIDE 100 MILLIGRAM(S): 50 TABLET ORAL at 17:46

## 2023-04-06 RX ADMIN — HEPARIN SODIUM 5000 UNIT(S): 5000 INJECTION INTRAVENOUS; SUBCUTANEOUS at 22:02

## 2023-04-06 RX ADMIN — OXYCODONE HYDROCHLORIDE 5 MILLIGRAM(S): 5 TABLET ORAL at 00:51

## 2023-04-06 RX ADMIN — Medication 100 MILLIGRAM(S): at 05:32

## 2023-04-06 RX ADMIN — LIDOCAINE 1 PATCH: 4 CREAM TOPICAL at 22:03

## 2023-04-06 RX ADMIN — Medication 650 MILLIGRAM(S): at 10:43

## 2023-04-06 RX ADMIN — Medication 650 MILLIGRAM(S): at 09:43

## 2023-04-06 RX ADMIN — DIVALPROEX SODIUM 750 MILLIGRAM(S): 500 TABLET, DELAYED RELEASE ORAL at 05:34

## 2023-04-06 RX ADMIN — ONDANSETRON 4 MILLIGRAM(S): 8 TABLET, FILM COATED ORAL at 09:43

## 2023-04-06 RX ADMIN — FENTANYL CITRATE 25 MICROGRAM(S): 50 INJECTION INTRAVENOUS at 09:20

## 2023-04-06 RX ADMIN — OXYCODONE HYDROCHLORIDE 5 MILLIGRAM(S): 5 TABLET ORAL at 22:01

## 2023-04-06 RX ADMIN — Medication 81 MILLIGRAM(S): at 13:04

## 2023-04-06 RX ADMIN — LIDOCAINE 1 PATCH: 4 CREAM TOPICAL at 02:00

## 2023-04-06 RX ADMIN — PANTOPRAZOLE SODIUM 40 MILLIGRAM(S): 20 TABLET, DELAYED RELEASE ORAL at 06:43

## 2023-04-06 RX ADMIN — BUMETANIDE 2 MILLIGRAM(S): 0.25 INJECTION INTRAMUSCULAR; INTRAVENOUS at 13:04

## 2023-04-06 RX ADMIN — Medication 1 APPLICATION(S): at 13:04

## 2023-04-06 RX ADMIN — FENTANYL CITRATE 25 MICROGRAM(S): 50 INJECTION INTRAVENOUS at 09:22

## 2023-04-06 RX ADMIN — Medication 25 MILLIGRAM(S): at 13:03

## 2023-04-06 RX ADMIN — OXYCODONE HYDROCHLORIDE 5 MILLIGRAM(S): 5 TABLET ORAL at 13:20

## 2023-04-06 RX ADMIN — FENTANYL CITRATE 25 MICROGRAM(S): 50 INJECTION INTRAVENOUS at 09:35

## 2023-04-06 RX ADMIN — LACOSAMIDE 100 MILLIGRAM(S): 50 TABLET ORAL at 05:33

## 2023-04-06 RX ADMIN — Medication 100 MILLIGRAM(S): at 17:46

## 2023-04-06 RX ADMIN — OXYCODONE HYDROCHLORIDE 5 MILLIGRAM(S): 5 TABLET ORAL at 05:50

## 2023-04-06 RX ADMIN — Medication 25 MILLIGRAM(S): at 05:33

## 2023-04-06 RX ADMIN — BUMETANIDE 2 MILLIGRAM(S): 0.25 INJECTION INTRAMUSCULAR; INTRAVENOUS at 05:33

## 2023-04-06 RX ADMIN — DIVALPROEX SODIUM 750 MILLIGRAM(S): 500 TABLET, DELAYED RELEASE ORAL at 17:46

## 2023-04-06 RX ADMIN — Medication 5 MILLIGRAM(S): at 21:59

## 2023-04-06 RX ADMIN — OXYCODONE HYDROCHLORIDE 5 MILLIGRAM(S): 5 TABLET ORAL at 14:20

## 2023-04-06 RX ADMIN — Medication 25 MILLIGRAM(S): at 21:59

## 2023-04-06 RX ADMIN — FENTANYL CITRATE 25 MICROGRAM(S): 50 INJECTION INTRAVENOUS at 09:06

## 2023-04-06 RX ADMIN — Medication 10 MILLILITER(S): at 09:00

## 2023-04-06 NOTE — PROCEDURE NOTE - NSINFORMCONSENT_GEN_A_CORE
Benefits, risks, and possible complications of procedure explained to patient/caregiver who verbalized understanding and gave written consent.
Benefits, risks, and possible complications of procedure explained to patient/caregiver who verbalized understanding and gave written consent.
Benefits, risks, and possible complications of procedure explained to patient/caregiver who verbalized understanding and gave verbal consent.
Benefits, risks, and possible complications of procedure explained to patient/caregiver who verbalized understanding and gave written consent.

## 2023-04-06 NOTE — PROCEDURE NOTE - PROCEDURE DATE TIME, MLM
22-Mar-2023 16:47
25-Mar-2023
30-Mar-2023
05-Apr-2023 14:16
30-Mar-2023 15:00
25-Mar-2023
25-Mar-2023
27-Mar-2023
06-Apr-2023 08:55
21-Mar-2023 07:49

## 2023-04-06 NOTE — PROGRESS NOTE ADULT - ASSESSMENT
54 yo M with non-oliguric iATN i/s/o replacement of aortic arch and TAVR on 3/20- prolonged OR time and massive transfusion and other blood products. Initiated on CVVHD (3/21-3/24) for clearance and volume. Started on diuresis on 3/24 with good response but remained vol overloaded, so started iHD 3/27 for volume and intermittent AQ on 3/31 now back on diuretic challenge    Assessment/Plan:   #Non-oliguric iATN due to intra-op hemodynamic changes, dialysis dependent  BCr 1.2, eGFR 67 (3/9)  UA (3/25) w/ proteinuria and mod blood w/o RBCs.  Showing some ATN recovery, U/O improving  CVVHD (3/21- 3/24)  AQ (3/31-4/2)    Plan:  Continue with bumex 2mg Q8h for today, will assess for HD needs on 4/7  strict I&Os  BMP per icu protocol  Maintain MAP >70 for renal perfusion     #Anemia  -Check iron profile and ferritin    Discussed w/ CTICU team

## 2023-04-06 NOTE — CHART NOTE - NSCHARTNOTEFT_GEN_A_CORE
Patient seen and examined at bedside.  Case discussed with Dr. Blanca. Minimal output from CT.  No air leak appreciated.  Per Dr. Blanca's request, CT removed and tie down secured without incident.  Occlusive DSD placed.  Patient tolerated procedure well.  Chest Xray pending. Patient seen and examined at bedside.  Case discussed with Dr. Brooks. Both posterior pigtails were kinked and not draining.  No air leak appreciated.  Per Dr. Brooks's request, bilateral pigtails removed and without incident.  Occlusive DSD placed.  Patient tolerated procedure well.  Chest Xray pending.

## 2023-04-06 NOTE — PROGRESS NOTE ADULT - SUBJECTIVE AND OBJECTIVE BOX
CTICU  CRITICAL  CARE  attending     Hand off received 					   Pertinent clinical, laboratory, radiographic, hemodynamic, echocardiographic, respiratory data, microbiologic data and chart were reviewed and analyzed frequently throughout the course of the day  Patient seen and examined with CTS/ SH attending at bedside  Pt is a 53yr old male with PMH HTN, type A dissection sp Dacron grafts and AV resuspension (2013), CAD sp CABG x 1 ([facility], 5/2013, SVG-RCA), seizures, admitted for surgical mgmt of progression of aneurysmal disease. Patient underwent replacement of transverse aortic arch, second stage thoracic endovascular aortic repair, aorto-axillary bypass, AV replacement (bio 23mm), and CABG x 1 (SVG-RCA) EF 75% with Dr. Pierre 3/20. Patient received 7 units pRBC, 6 FFP, 4 plt, 15 cryo, 1000 FEIBA, and uo 1300cc. Started on lasix infusion and phenylephrine, bicarb, Armaan, levo and vaso, with lactic acidosis. 3/21 L femoral HD cath placed and CVVHD started with improvement in acidosis. Pressors off by end of day. Primacor weaned overnight. Patient woke up and follows commands, exhibited facial twitching cw prior seizures per wife and ativan given followed by vEEG placement. Neurology consulted for recs given subtherapeutic AED levels. Given 2 units pRBC. 3/22 Neurology recommended dc vEEG given low suspicion for seizures. Fluid removal initiated with CVVHD. Overnight poor oxygenation requiring bronchoscopy, on laquita and vaso, D10 started for hypoglycemia. 3/23 large residuals, reglan started. CTH performed for poor mentation-no bleed. Amio given for afib. Received 1 unit pRBC and 1 plt, femoral a line removed. CVVHD stopped and diuretic initiated with good response. Primacor turned off. 3/25 new TLC and R cordis with swan placed, tolerated cpap. D10 for hypoglycemia. Underwent aquaphoresis via new LIJ HD cath. Low grade fevers. Overnight swan dc'd. 3/27 New RIJ HDC placed for malfunctioning LIJ, underwent HD with 3.5L removed. Junctional rhythm overnight. 3/29 extubated to HFNC, passed SLP. 3/30 placed on BIPAP for tachypnea, underwent bronchoscopy, new LIJ TLC placed for access, HD performed. TF via dobhoff. L pigtail placed with 700cc out. 4/1 Underwent HD followed by back on aquaphoresis. Tolerating PO diet. 4/3 given 1 unit pRBC for orthostasis. 4/4 started on ancef for L knee ulcer. 4/5 Underwent R pigtail placement for large pleural effusion. Cb R pneumothorax.     FAMILY HISTORY:  No pertinent family history in first degree relatives  PAST MEDICAL & SURGICAL HISTORY:  HTN (hypertension)  Aortic dissection  CAD (coronary artery disease)  Seizure disorder  S/P aortic bifurcation bypass graft  S/P CABG x 1        Patient is a 53y old  Male who presents with a chief complaint of expanding aortic arch and descending aortic aneurysm.      14 system review was unremarkable    Vital signs, hemodynamic and respiratory parameters were reviewed from the bedside nursing flowsheet.  ICU Vital Signs Last 24 Hrs  T(C): 36.6 (06 Apr 2023 04:48), Max: 36.8 (05 Apr 2023 21:44)  T(F): 97.9 (06 Apr 2023 04:48), Max: 98.2 (05 Apr 2023 21:44)  HR: 95 (06 Apr 2023 07:00) (93 - 113)  BP: 146/93 (06 Apr 2023 07:00) (127/81 - 157/93)  BP(mean): 114 (06 Apr 2023 07:00) (100 - 118)  ABP: --  ABP(mean): --  RR: 21 (06 Apr 2023 07:00) (16 - 29)  SpO2: 70% (06 Apr 2023 07:00) (70% - 98%)    O2 Parameters below as of 06 Apr 2023 07:00  Patient On (Oxygen Delivery Method): room air          Adult Advanced Hemodynamics Last 24 Hrs  CVP(mm Hg): --  CVP(cm H2O): --  CO: --  CI: --  PA: --  PA(mean): --  PCWP: --  SVR: --  SVRI: --  PVR: --  PVRI: --,     Intake and output was reviewed and the fluid balance was calculated  Daily     Daily   I&O's Summary    05 Apr 2023 07:01  -  06 Apr 2023 07:00  --------------------------------------------------------  IN: 540 mL / OUT: 4565 mL / NET: -4025 mL        All lines and drain sites were assessed  Glycemic trend was reviewed    POCT Blood Glucose.: 84 mg/dL (06 Apr 2023 06:02)    Neuro: follows commands  HEENT: mmm  Heart: s1 s2   Lungs: decreased bl  Abdomen: soft nt nd  Extremities: 2+dp    Tubes:  L pigtail  R pigtail    labs  CBC Full  -  ( 06 Apr 2023 04:08 )  WBC Count : 22.79 K/uL  RBC Count : 3.29 M/uL  Hemoglobin : 10.0 g/dL  Hematocrit : 30.7 %  Platelet Count - Automated : 293 K/uL  Mean Cell Volume : 93.3 fl  Mean Cell Hemoglobin : 30.4 pg  Mean Cell Hemoglobin Concentration : 32.6 gm/dL  Auto Neutrophil # : x  Auto Lymphocyte # : x  Auto Monocyte # : x  Auto Eosinophil # : x  Auto Basophil # : x  Auto Neutrophil % : x  Auto Lymphocyte % : x  Auto Monocyte % : x  Auto Eosinophil % : x  Auto Basophil % : x    04-06    136  |  101  |  38<H>  ----------------------------<  76  4.0   |  26  |  3.01<H>    Ca    8.5      06 Apr 2023 04:08  Phos  4.4     04-06  Mg     2.0     04-06    TPro  5.6<L>  /  Alb  2.3<L>  /  TBili  0.4  /  DBili  x   /  AST  19  /  ALT  7<L>  /  AlkPhos  79  04-06    PT/INR - ( 06 Apr 2023 04:08 )   PT: 14.8 sec;   INR: 1.24          PTT - ( 06 Apr 2023 04:08 )  PTT:31.1 sec  The current medications were reviewed   MEDICATIONS  (STANDING):  aspirin  chewable 81 milliGRAM(s) Oral daily  buMETAnide Injectable 2 milliGRAM(s) IV Push every 8 hours  ceFAZolin   IVPB 1000 milliGRAM(s) IV Intermittent every 12 hours  chlorhexidine 2% Cloths 1 Application(s) Topical daily  collagenase Ointment 1 Application(s) Topical daily  dextrose 5%. 1000 milliLiter(s) (50 mL/Hr) IV Continuous <Continuous>  dextrose 5%. 1000 milliLiter(s) (100 mL/Hr) IV Continuous <Continuous>  dextrose 50% Injectable 50 milliLiter(s) IV Push every 15 minutes  dextrose 50% Injectable 25 milliLiter(s) IV Push every 15 minutes  divalproex  milliGRAM(s) Oral <User Schedule>  glucagon  Injectable 1 milliGRAM(s) IntraMuscular once  heparin   Injectable 5000 Unit(s) SubCutaneous every 8 hours  insulin lispro (ADMELOG) corrective regimen sliding scale   SubCutaneous Before meals and at bedtime  lacosamide 100 milliGRAM(s) Oral two times a day  lidocaine   4% Patch 1 Patch Transdermal every 24 hours  lidocaine 2% Jelly 10 milliLiter(s) IntraUrethral once  melatonin 5 milliGRAM(s) Oral at bedtime  metoprolol tartrate 25 milliGRAM(s) Oral every 8 hours  multivitamin/minerals/iron Oral Solution (CENTRUM) 15 milliLiter(s) Oral daily  pantoprazole    Tablet 40 milliGRAM(s) Oral before breakfast  sodium chloride 0.9%. 1000 milliLiter(s) (10 mL/Hr) IV Continuous <Continuous>    MEDICATIONS  (PRN):  acetaminophen     Tablet .. 650 milliGRAM(s) Oral every 6 hours PRN Mild Pain (1 - 3)  dextrose Oral Gel 15 Gram(s) Oral once PRN Blood Glucose LESS THAN 70 milliGRAM(s)/deciliter  oxyCODONE    IR 5 milliGRAM(s) Oral every 4 hours PRN Moderate Pain (4 - 6)      Assessment/Plan:  53yr old male with PMH HTN, type A dissection sp Dacron grafts and AV resuspension (2013), CAD sp CABG x 1 ([facility], 5/2013, SVG-RCA), seizures, admitted for surgical mgmt of progression of aneurysmal disease.     POD17 replacement of transverse aortic arch, second stage thoracic endovascular aortic repair, aorto-axillary bypass, AV replacement (bio 23mm), and CABG x 1 (SVG-RCA) (EF 75%, [place], 3/20)  Post pigtail pneumothorax, will need new pigtail  NC prn  ATN, diuresis prn  On Ancef for L knee ulcer  Continue AEDs  Acute post operative anemia-trend H/H  Thrombocytopenia-improving  Bblocker, uptitrate prn  Replete lytes prn  Monitor CT output  Diet as tolerated  GI/DVT PPX  Bowel Regimen  Pain control  OOB with PT  Close hemodynamic, ventilatory and drain monitoring and management per post op routine  Monitor for arrhythmias and monitor parameters for organ perfusion  Monitor neurologic status  Head of the bed should remain elevated to 45 deg   Chest PT and IS will be encouraged  Monitor adequacy of oxygenation and ventilation and attempt to wean oxygen  Antibiotic regimen will be tailored to the clinical, laboratory and microbiologic data  Nutritional goals will be met using po eventually, ensure adequate caloric intake and monitor the same  Stress ulcer and VTE prophylaxis will be achieved    Glycemic control is satisfactory  Electrolytes have been repleted as necessary and wound care has been carried out   Pain control has been achieved.   Aggressive physical therapy and early mobility and ambulation goals will be met   The family was updated about the course and plan.    CRITICAL CARE TIME personally provided by me  in evaluation and management, reassessments, review and interpretation of labs and x-rays, ventilator and hemodynamic management, formulating a plan and coordinating care: ___35____ MIN.  Time does not include procedural time.    CTICU ATTENDING     					  Alexx Brooks MD

## 2023-04-06 NOTE — PROCEDURE NOTE - NSPROCNAME_GEN_A_CORE
Chest Tube
Central Line Insertion
Feeding Tube Placement
Chest Tube
Chest Tube
Central Line Insertion
Central Line Insertion

## 2023-04-06 NOTE — PROGRESS NOTE ADULT - ASSESSMENT
52 year old male, current every day marijuana use w/pmh of HTN, type A aortic dissection s/p Dacron grafts, AV resuspension in 2013,CAD s/p CABG x 1 SVG to RCA (5/2013 with Dr. Medina), seizure disorder (last episode on 7/4/22) presents for a follow up visit for evaluation and management of his aortic pathology and deemed a candidate for a reop total arch replacement given aortic aneurysm and chronic dissection.  S/p AVR/ Arch replacement/ aorto-axillary bypass- CABG x 1 and TEVAR   3/20  Postop course with worsening renal failure and sig acidosis started on CVVHD  Afib 3/23  Extubated 03/29  completed 7 day course of zosyn for serratia VAP last day 4/2   A/p   - MS and neuro exam intact- continue AEDs  - postop afib converted to sinus- IV and PO amio loaded not on maintenance amio- continue lopressor 25 mg q8  Titrating according to MAPs > 70 for adequate renal perfusion   - Renal failure-iATN given long operation and clamp time/ required CVVHD for acidosis though non oliguric renal injury-->off dialysis   HD cath removed 4/2  No need for urgent dialytic interventions- good diuretic response   Clinically euvolemic- Will consider PRN diuresis to maintain neutral FB replete lytes for k> 4 and mg > 2   H&H and plt in good range  Leukocytosis unchanged mild left shift no bandemia and afebrile, no diarrhea has a left knee ulcer w/ sloughing wound care is following recs appreciate   [place] site cellulitic started on Ancef 3/4   Continue ICU prophylactic measures  Optimize nutrition, ambulation and daily PT- pigtail with no air leak will dc tmr     ATTENDING: I have personally and independently provided 30 Min of critical care services.

## 2023-04-06 NOTE — PROCEDURE NOTE - PRACTITIONER PERFORMING THE TIME OUT
michi cameron , pedro pablo steele
LILIA Vasquez
michi rubin rn
michi pizarro rn
Kathy Fletcher PA-C
michi romo rn
michi edmonds rn

## 2023-04-06 NOTE — PROGRESS NOTE ADULT - SUBJECTIVE AND OBJECTIVE BOX
--------------------------------------------------------------------------------  Chief Complaint: ESRD/Ongoing hemodialysis requirement    24 hour events/subjective:  Urine output last 24 hours 3L with bumex. Creatinine slowly improving.  No acute complaints this AM.     PAST HISTORY  --------------------------------------------------------------------------------  No significant changes to PMH, PSH, FHx, SHx, unless otherwise noted    ALLERGIES & MEDICATIONS  --------------------------------------------------------------------------------  Allergies    No Known Allergies    Intolerances      Standing Inpatient Medications  aspirin  chewable 81 milliGRAM(s) Oral daily  buMETAnide Injectable 2 milliGRAM(s) IV Push every 8 hours  ceFAZolin   IVPB 1000 milliGRAM(s) IV Intermittent every 12 hours  chlorhexidine 2% Cloths 1 Application(s) Topical daily  collagenase Ointment 1 Application(s) Topical daily  dextrose 5%. 1000 milliLiter(s) IV Continuous <Continuous>  dextrose 5%. 1000 milliLiter(s) IV Continuous <Continuous>  dextrose 50% Injectable 50 milliLiter(s) IV Push every 15 minutes  dextrose 50% Injectable 25 milliLiter(s) IV Push every 15 minutes  divalproex  milliGRAM(s) Oral <User Schedule>  fentaNYL    Injectable 25 MICROGram(s) IV Push once  fentaNYL    Injectable 25 MICROGram(s) IV Push once  glucagon  Injectable 1 milliGRAM(s) IntraMuscular once  heparin   Injectable 5000 Unit(s) SubCutaneous every 8 hours  insulin lispro (ADMELOG) corrective regimen sliding scale   SubCutaneous Before meals and at bedtime  lacosamide 100 milliGRAM(s) Oral two times a day  lidocaine   4% Patch 1 Patch Transdermal every 24 hours  lidocaine 1% Injectable 10 milliLiter(s) Local Injection once  lidocaine 2% Jelly 10 milliLiter(s) IntraUrethral once  melatonin 5 milliGRAM(s) Oral at bedtime  metoprolol tartrate 25 milliGRAM(s) Oral every 8 hours  multivitamin/minerals/iron Oral Solution (CENTRUM) 15 milliLiter(s) Oral daily  pantoprazole    Tablet 40 milliGRAM(s) Oral before breakfast  sodium chloride 0.9%. 1000 milliLiter(s) IV Continuous <Continuous>    PRN Inpatient Medications  acetaminophen     Tablet .. 650 milliGRAM(s) Oral every 6 hours PRN  dextrose Oral Gel 15 Gram(s) Oral once PRN  oxyCODONE    IR 5 milliGRAM(s) Oral every 4 hours PRN      REVIEW OF SYSTEMS  --------------------------------------------------------------------------------  All other systems were reviewed and are negative, except as noted.    VITALS/PHYSICAL EXAM  --------------------------------------------------------------------------------  T(C): 36.6 (04-06-23 @ 04:48), Max: 36.8 (04-05-23 @ 21:44)  HR: 101 (04-06-23 @ 08:00) (93 - 113)  BP: 150/92 (04-06-23 @ 08:00) (127/81 - 157/93)  RR: 20 (04-06-23 @ 08:00) (16 - 29)  SpO2: 96% (04-06-23 @ 08:00) (70% - 98%)  Wt(kg): --  Drug Dosing Weight  Height (cm): 182.9 (20 Mar 2023 07:23)  Weight (kg): 77.1 (20 Mar 2023 07:23)  BMI (kg/m2): 23 (20 Mar 2023 07:23)  BSA (m2): 1.99 (20 Mar 2023 07:23)        04-05-23 @ 07:01  -  04-06-23 @ 07:00  --------------------------------------------------------  IN: 540 mL / OUT: 4565 mL / NET: -4025 mL    04-06-23 @ 07:01  -  04-06-23 @ 09:08  --------------------------------------------------------  IN: 0 mL / OUT: 400 mL / NET: -400 mL    PHYSICAL EXAM:  GENERAL: NAD   NECK: supple, No JVD  CHEST/LUNG: CTLA B/L, on RA  HEART: normal S1S2, RRR  ABDOMEN: Soft, Nontender, +BS, No flank tenderness bilateral  EXTREMITIES: No clubbing, cyanosis, or edema   NEUROLOGY: Asnwers questions, no focal neurological deficit  ACCESS: Rt IJ S/Q HD Cath c/d/i      LABS/STUDIES  --------------------------------------------------------------------------------              10.0   22.79 >-----------<  293      [04-06-23 @ 04:08]              30.7     136  |  101  |  38  ----------------------------<  76      [04-06-23 @ 04:08]  4.0   |  26  |  3.01        Ca     8.5     [04-06-23 @ 04:08]      Mg     2.0     [04-06-23 @ 04:08]      Phos  4.4     [04-06-23 @ 04:08]    TPro  5.6  /  Alb  2.3  /  TBili  0.4  /  DBili  x   /  AST  19  /  ALT  7   /  AlkPhos  79  [04-06-23 @ 04:08]    PT/INR: PT 14.8 , INR 1.24       [04-06-23 @ 04:08]  PTT: 31.1       [04-06-23 @ 04:08]      TSH 0.626      [03-09-23 @ 12:54]  Lipid: chol 130, TG 85, HDL 43, LDL --      [03-09-23 @ 10:04]    HBsAb Nonreact      [03-30-23 @ 12:02]  HBsAg Nonreact      [03-09-23 @ 12:54]  HCV 0.29, Nonreact      [03-30-23 @ 12:02]      RADIOLOGY:  --------------------------------------------------------------------------------------

## 2023-04-06 NOTE — PROCEDURE NOTE - NSINDICATIONS_GEN_A_CORE
feeding difficulties
pleural effusion
pneumothorax
critical illness/emergency venous access
dialysis/CRRT
critical illness/emergency venous access
dialysis/CRRT
pleural effusion
hemodynamic monitoring
dialysis/CRRT

## 2023-04-06 NOTE — PROCEDURE NOTE - NSTIMEOUT_GEN_A_CORE
"Requested Prescriptions  Last Written Prescription Date:  2/27/2017  Last Fill Quantity: 90,  # refills: 3   Last office visit: 2/27/2017 with prescribing provider:  2/27/2017   Future Office Visit:   Next 5 appointments (look out 90 days)     Mar 12, 2018  8:00 AM CDT   PHYSICAL with Corby Gibson MD   [facility] ([facility]    [address] [id number] [phone]                    Pending Prescriptions Disp Refills     amLODIPine (NORVASC) 5 MG tablet 90 tablet 3     Sig: Take 1 tablet (5 mg) by mouth daily    Calcium Channel Blockers Protocol  Passed    2/22/2018  9:11 AM       Passed - Blood pressure under 140/90 in past 12 months    BP Readings from Last 3 Encounters:   11/30/17 139/82   09/07/17 142/79   07/06/17 161/87                Passed - Recent or future visit with authorizing provider    Patient had office visit in the last year or has a visit in the next 30 days with authorizing provider.  See \"Patient Info\" tab in inbasket, or \"Choose Columns\" in Meds & Orders section of the refill encounter.            Passed - Patient is age 18 or older         Passed - Normal serum creatinine on file in past 12 months    Recent Labs   Lab Test  02/27/17   0723   CR  0.87             losartan (COZAAR) 100 MG tablet  Last Written Prescription Date:  2/27/2017  Last Fill Quantity: 90,  # refills: 3   Last office visit: 2/27/2017 with prescribing provider:  2/27/2017   Future Office Visit:   Next 5 appointments (look out 90 days)     Mar 12, 2018  8:00 AM CDT   PHYSICAL with Corby Gibson MD   [facility] ([facility])    [address] [id number] [phone]                  90 tablet 3     Sig: Take 1 tablet (100 mg) by mouth daily    Angiotensin-II Receptors Passed    2/22/2018  9:11 AM       Passed - Blood pressure under 140/90 in past 12 months    BP Readings "
"from Last 3 Encounters:   11/30/17 139/82   09/07/17 142/79   07/06/17 161/87                Passed - Recent or future visit with authorizing provider's specialty    Patient had office visit in the last year or has a visit in the next 30 days with authorizing provider.  See \"Patient Info\" tab in inbasket, or \"Choose Columns\" in Meds & Orders section of the refill encounter.            Passed - Patient is age 18 or older       Passed - Normal serum creatinine on file in past 12 months    Recent Labs   Lab Test  02/27/17   0723   CR  0.87            Passed - Normal serum potassium on file in past 12 months    Recent Labs   Lab Test  02/27/17   0723   POTASSIUM  3.9                      "
Patient's first and last name, , procedure, and correct site confirmed prior to the start of procedure.

## 2023-04-06 NOTE — PROCEDURE NOTE - NSPROCDETAILS_GEN_ALL_CORE
guidewire recovered/sterile dressing applied/sterile technique, catheter placed/ultrasound guidance with use of sterile gel and probe cove
guidewire recovered/lumen(s) aspirated and flushed/sterile dressing applied/sterile technique, catheter placed/ultrasound guidance with use of sterile gel and probe cove
ultrasound guidance with use of sterile gel and probe cove
Seldinger technique
Viky technique/dressing applied/secured in place/sterile dressing applied
guidewire recovered/lumen(s) aspirated and flushed/sterile dressing applied/sterile technique, catheter placed/ultrasound guidance with use of sterile gel and probe cove
Seldinger technique/dressing applied/secured in place/sterile dressing applied/supine position/percutaneous/thoracostomy tube placed percutaneously
guidewire recovered/lumen(s) aspirated and flushed/sterile dressing applied/sterile technique, catheter placed/ultrasound guidance with use of sterile gel and probe cove

## 2023-04-06 NOTE — CHART NOTE - NSCHARTNOTEFT_GEN_A_CORE
Admitting Diagnosis:   Patient is a 53y old  Male who presents with a chief complaint of expanding aortic arch and descending aortic aneurysm (06 Apr 2023 09:07)  PAST MEDICAL & SURGICAL HISTORY:  HTN (hypertension)  Aortic dissection  CAD (coronary artery disease)  Seizure disorder  S/P aortic bifurcation bypass graft  S/P CABG x 1    Current Nutrition Order:  Diet, DASH/TLC:   Sodium & Cholesterol Restricted (04-05-23 @ 17:21)    PO Intake: Good (%) [   ]  Fair (50-75%) [   ] Poor (<25%) [   ]    GI Issues:     Pain:    Skin Integrity:    Labs:   04-06    136  |  101  |  38<H>  ----------------------------<  76  4.0   |  26  |  3.01<H>    Ca    8.5      06 Apr 2023 04:08  Phos  4.4     04-06  Mg     2.0     04-06    TPro  5.6<L>  /  Alb  2.3<L>  /  TBili  0.4  /  DBili  x   /  AST  19  /  ALT  7<L>  /  AlkPhos  79  04-06    CAPILLARY BLOOD GLUCOSE    POCT Blood Glucose.: 80 mg/dL (06 Apr 2023 13:32)  POCT Blood Glucose.: 84 mg/dL (06 Apr 2023 06:02)  POCT Blood Glucose.: 89 mg/dL (05 Apr 2023 21:44)  POCT Blood Glucose.: 80 mg/dL (05 Apr 2023 15:33)    Medications:  MEDICATIONS  (STANDING):  aspirin  chewable 81 milliGRAM(s) Oral daily  buMETAnide Injectable 2 milliGRAM(s) IV Push every 8 hours  ceFAZolin   IVPB 1000 milliGRAM(s) IV Intermittent every 12 hours  chlorhexidine 2% Cloths 1 Application(s) Topical daily  collagenase Ointment 1 Application(s) Topical daily  dextrose 5%. 1000 milliLiter(s) (50 mL/Hr) IV Continuous <Continuous>  dextrose 5%. 1000 milliLiter(s) (100 mL/Hr) IV Continuous <Continuous>  dextrose 50% Injectable 50 milliLiter(s) IV Push every 15 minutes  dextrose 50% Injectable 25 milliLiter(s) IV Push every 15 minutes  divalproex  milliGRAM(s) Oral <User Schedule>  glucagon  Injectable 1 milliGRAM(s) IntraMuscular once  heparin   Injectable 5000 Unit(s) SubCutaneous every 8 hours  insulin lispro (ADMELOG) corrective regimen sliding scale   SubCutaneous Before meals and at bedtime  lacosamide 100 milliGRAM(s) Oral two times a day  lidocaine   4% Patch 1 Patch Transdermal every 24 hours  lidocaine 2% Jelly 10 milliLiter(s) IntraUrethral once  melatonin 5 milliGRAM(s) Oral at bedtime  metoprolol tartrate 25 milliGRAM(s) Oral every 8 hours  multivitamin/minerals/iron Oral Solution (CENTRUM) 15 milliLiter(s) Oral daily  pantoprazole    Tablet 40 milliGRAM(s) Oral before breakfast  sodium chloride 0.9%. 1000 milliLiter(s) (10 mL/Hr) IV Continuous <Continuous>    MEDICATIONS  (PRN):  acetaminophen     Tablet .. 650 milliGRAM(s) Oral every 6 hours PRN Mild Pain (1 - 3)  dextrose Oral Gel 15 Gram(s) Oral once PRN Blood Glucose LESS THAN 70 milliGRAM(s)/deciliter  oxyCODONE    IR 5 milliGRAM(s) Oral every 4 hours PRN Moderate Pain (4 - 6)    Dosing Anthropometrics:   Height for BMI (FEET)	6 Feet  Height for BMI (INCHES)	0 Inch(s)  Height for BMI (CM)	182.88 Centimeter(s)  Weight for BMI (lbs)	170 lb  Weight for BMI (kg)	77.1 kg  Body Mass Index	              23     Weight Change: no new weights since previous follow up eval;  previous weights: 4/1 88.8 kg; 4/1 86.8kg - received HD, wt fluctuations related to fluid shifts as expected    Nutrition Focused Physical Exam: Completed [   ]  Not Pertinent [   ]  Muscle Wasting- Temporal [   ]  Clavicle/Pectoral [   ]  Shoulder/Deltoid [   ]  Scapula [   ]  Interosseous [   ]  Quadriceps [   ]  Gastrocnemius [   ]  Fat Wasting- Orbital [   ]  Buccal [   ]  Triceps [   ]  Rib [   ]  Suspect [PCM] 2/2 to physical assessment, [poor intake], and [wt loss]; please see malnutrition chart note.    Estimated energy needs:   Estimated Energy Needs Weight (lbs)	178 lb  Estimated Energy Needs Weight (kg)	80.7 kg  Estimated Energy Needs From (ifenayi/kg)	25   Estimated Energy Needs To (ifeanyi/kg)	30   Estimated Energy Needs Calculated From (ifeanyi/kg)	2017   Estimated Energy Needs Calculated To (ifeanyi/kg)	2421     Estimated Protein Needs Weight (lbs)	178 lb  Estimated Protein Needs Weight (kg)	80.7 kg  Estimated Protein Needs From (g/kg)	1.2  Estimated Protein Needs To (g/kg)	1.4  Estimated Protein Needs Calculated From (g/kg)	97.2  Estimated Protein Needs Calculated To (g/kg)	113    Subjective: Pt is a 53yr old male with PMH HTN, type A dissection sp Dacron grafts and AV resuspension (2013), CAD sp CABG x 1 ([facility], 5/2013, SVG-RCA), seizures, admitted for surgical mgmt of progression of aneurysmal disease. Patient underwent replacement of transverse aortic arch, second stage thoracic endovascular aortic repair, aorto-axillary bypass, AV replacement (bio 23mm), and CABG x 1 (SVG-RCA) EF 75% with Dr. Pierre 3/20. Patient received 7 units pRBC, 6 FFP, 4 plt, 15 cryo, 1000 FEIBA, and uo 1300cc. Started on lasix infusion and phenylephrine, bicarb, Armaan, levo and vaso, with lactic acidosis. 3/21 L femoral HD cath placed and CVVHD started with improvement in acidosis. Pressors off by end of day. Primacor weaned overnight. Patient woke up and follows commands, exhibited facial twitching cw prior seizures per wife and ativan given followed by vEEG placement. Neurology consulted for recs given subtherapeutic AED levels. Given 2 units pRBC. 3/22 Neurology recommended dc vEEG given low suspicion for seizures. Fluid removal initiated with CVVHD. Overnight poor oxygenation requiring bronchoscopy, on laquita and vaso, D10 started for hypoglycemia. 3/23 large residuals, reglan started. CTH performed for poor mentation-no bleed. Amio given for afib. Received 1 unit pRBC and 1 plt, femoral a line removed. CVVHD stopped and diuretic initiated with good response. Primacor turned off. 3/25 new TLC and R cordis with swan placed, tolerated cpap. D10 for hypoglycemia. Underwent aquaphoresis via new J HD cath. Now with stable lytes, adequate clearance and stable renal function, responding well with Bumex 2mg Q12hrs. 4/1: HD Aquapheresis in between for volume removal. Started back on PO diet. Pt's tube feedings have been discontinued on 4/1/23, pt's diet has been restarted on 4/1/23, calorie count started on 4/1/23. HD on 4/1/23. Collegenase ordered for L knee tear 4/4/23.     Visited pt at bedside this morning, pt was lethargic and unable to participate in exam. RD attempted to visit pt again after lunchtime, pt's wife was at bedside. Pt endorsed he was in pain. Pt is on DASH/TLC diet. Intake remains varied, as pt's RN endorsed pt ate some fruit for breakfast, pt endorsed he ate most of his lunch today, however pt's RN stated pt ate 0-25% PO overall yesterday. Per EMR, last BM 4/5 per chart. Nutrition-related labs: elevated BUN (30), Cr (3.01), low ALT (7), low eGFR (24). Pt has not been receiving the Nepro ONS that were previously recommended, however pt endorsed he enjoys these ONS, RD to recommend and RD to remain available per protocol. Please refer to full recs below.    Previous Nutrition Dx: Inadequate Energy Intake r/t inability to meet EER on current diet AEB intake <75% EER    Active [ X ]  Resolved [   ]    Goal: Consistently meets > 75% EER via most appropriate route of nutrition     Recommendations:  1. Continue align nutrition with SLP recs at all time  2. Continue current diet order  3. Add Nepro Carbsteady ONS TID (1260Kcal, 57g protein provided)  >> if poor PO intake/tolerance continues, please restart TF  4. Monitor PO intake closely; honor pt food preferences as able  5. Provide max encouragement with all meals   6. Monitor wts, skin integrity, chemistry, GI fxn  7. Pain/bowel regimen per team   6. RD to remain available for recs adjustment prn or will follow up pt per organizational policy     Education: deferred     Risk Level: High [ X ] Moderate [   ] Low [   ] Admitting Diagnosis:   Patient is a 53y old  Male who presents with a chief complaint of expanding aortic arch and descending aortic aneurysm (06 Apr 2023 09:07)  PAST MEDICAL & SURGICAL HISTORY:  HTN (hypertension)  Aortic dissection  CAD (coronary artery disease)  Seizure disorder  S/P aortic bifurcation bypass graft  S/P CABG x 1    Current Nutrition Order:  Diet, DASH/TLC:   Sodium & Cholesterol Restricted (04-05-23 @ 17:21)    PO Intake: Good (%) [   ]  Fair (50-75%) [   ] Poor (<25%) [   ]    GI Issues:     Pain:    Skin Integrity:    Labs:   04-06    136  |  101  |  38<H>  ----------------------------<  76  4.0   |  26  |  3.01<H>    Ca    8.5      06 Apr 2023 04:08  Phos  4.4     04-06  Mg     2.0     04-06    TPro  5.6<L>  /  Alb  2.3<L>  /  TBili  0.4  /  DBili  x   /  AST  19  /  ALT  7<L>  /  AlkPhos  79  04-06    CAPILLARY BLOOD GLUCOSE    POCT Blood Glucose.: 80 mg/dL (06 Apr 2023 13:32)  POCT Blood Glucose.: 84 mg/dL (06 Apr 2023 06:02)  POCT Blood Glucose.: 89 mg/dL (05 Apr 2023 21:44)  POCT Blood Glucose.: 80 mg/dL (05 Apr 2023 15:33)    Medications:  MEDICATIONS  (STANDING):  aspirin  chewable 81 milliGRAM(s) Oral daily  buMETAnide Injectable 2 milliGRAM(s) IV Push every 8 hours  ceFAZolin   IVPB 1000 milliGRAM(s) IV Intermittent every 12 hours  chlorhexidine 2% Cloths 1 Application(s) Topical daily  collagenase Ointment 1 Application(s) Topical daily  dextrose 5%. 1000 milliLiter(s) (50 mL/Hr) IV Continuous <Continuous>  dextrose 5%. 1000 milliLiter(s) (100 mL/Hr) IV Continuous <Continuous>  dextrose 50% Injectable 50 milliLiter(s) IV Push every 15 minutes  dextrose 50% Injectable 25 milliLiter(s) IV Push every 15 minutes  divalproex  milliGRAM(s) Oral <User Schedule>  glucagon  Injectable 1 milliGRAM(s) IntraMuscular once  heparin   Injectable 5000 Unit(s) SubCutaneous every 8 hours  insulin lispro (ADMELOG) corrective regimen sliding scale   SubCutaneous Before meals and at bedtime  lacosamide 100 milliGRAM(s) Oral two times a day  lidocaine   4% Patch 1 Patch Transdermal every 24 hours  lidocaine 2% Jelly 10 milliLiter(s) IntraUrethral once  melatonin 5 milliGRAM(s) Oral at bedtime  metoprolol tartrate 25 milliGRAM(s) Oral every 8 hours  multivitamin/minerals/iron Oral Solution (CENTRUM) 15 milliLiter(s) Oral daily  pantoprazole    Tablet 40 milliGRAM(s) Oral before breakfast  sodium chloride 0.9%. 1000 milliLiter(s) (10 mL/Hr) IV Continuous <Continuous>    MEDICATIONS  (PRN):  acetaminophen     Tablet .. 650 milliGRAM(s) Oral every 6 hours PRN Mild Pain (1 - 3)  dextrose Oral Gel 15 Gram(s) Oral once PRN Blood Glucose LESS THAN 70 milliGRAM(s)/deciliter  oxyCODONE    IR 5 milliGRAM(s) Oral every 4 hours PRN Moderate Pain (4 - 6)    Dosing Anthropometrics:   Height for BMI (FEET)	6 Feet  Height for BMI (INCHES)	0 Inch(s)  Height for BMI (CM)	182.88 Centimeter(s)  Weight for BMI (lbs)	170 lb  Weight for BMI (kg)	77.1 kg  Body Mass Index	              23     Weight Change: no new weights since previous follow up eval;  previous weights: 4/1 88.8 kg; 4/1 86.8kg - received HD, wt fluctuations related to fluid shifts as expected    Nutrition Focused Physical Exam: Completed [   ]  Not Pertinent [   ]  Muscle Wasting- Temporal [   ]  Clavicle/Pectoral [   ]  Shoulder/Deltoid [   ]  Scapula [   ]  Interosseous [   ]  Quadriceps [   ]  Gastrocnemius [   ]  Fat Wasting- Orbital [   ]  Buccal [   ]  Triceps [   ]  Rib [   ]  Suspect [PCM] 2/2 to physical assessment, [poor intake], and [wt loss]; please see malnutrition chart note.    Estimated energy needs:   Estimated Energy Needs Weight (lbs)	178 lb  Estimated Energy Needs Weight (kg)	80.7 kg  Estimated Energy Needs From (ifeanyi/kg)	25   Estimated Energy Needs To (ifeanyi/kg)	30   Estimated Energy Needs Calculated From (ifeanyi/kg)	2017   Estimated Energy Needs Calculated To (ifeanyi/kg)	2421     Estimated Protein Needs Weight (lbs)	178 lb  Estimated Protein Needs Weight (kg)	80.7 kg  Estimated Protein Needs From (g/kg)	1.2  Estimated Protein Needs To (g/kg)	1.4  Estimated Protein Needs Calculated From (g/kg)	97.2  Estimated Protein Needs Calculated To (g/kg)	113    Subjective: Pt is a 53yr old male with PMH HTN, type A dissection sp Dacron grafts and AV resuspension (2013), CAD sp CABG x 1 ([facility], 5/2013, SVG-RCA), seizures, admitted for surgical mgmt of progression of aneurysmal disease. Patient underwent replacement of transverse aortic arch, second stage thoracic endovascular aortic repair, aorto-axillary bypass, AV replacement (bio 23mm), and CABG x 1 (SVG-RCA) EF 75% with Dr. Pierre 3/20. Patient received 7 units pRBC, 6 FFP, 4 plt, 15 cryo, 1000 FEIBA, and uo 1300cc. Started on lasix infusion and phenylephrine, bicarb, Armaan, levo and vaso, with lactic acidosis. 3/21 L femoral HD cath placed and CVVHD started with improvement in acidosis. Pressors off by end of day. Primacor weaned overnight. Patient woke up and follows commands, exhibited facial twitching cw prior seizures per wife and ativan given followed by vEEG placement. Neurology consulted for recs given subtherapeutic AED levels. Given 2 units pRBC. 3/22 Neurology recommended dc vEEG given low suspicion for seizures. Fluid removal initiated with CVVHD. Overnight poor oxygenation requiring bronchoscopy, on laquita and vaso, D10 started for hypoglycemia. 3/23 large residuals, reglan started. CTH performed for poor mentation-no bleed. Amio given for afib. Received 1 unit pRBC and 1 plt, femoral a line removed. CVVHD stopped and diuretic initiated with good response. Primacor turned off. 3/25 new TLC and R cordis with swan placed, tolerated cpap. D10 for hypoglycemia. Underwent aquaphoresis via new J HD cath. Now with stable lytes, adequate clearance and stable renal function, responding well with Bumex 2mg Q12hrs. 4/1: HD Aquapheresis in between for volume removal. Started back on PO diet. Pt's tube feedings have been discontinued on 4/1/23, pt's diet has been restarted on 4/1/23, calorie count started on 4/1/23. HD on 4/1/23. Collegenase ordered for L knee tear 4/4/23.     Visited pt at bedside this morning, pt was lethargic and unable to participate in exam. RD attempted to visit pt again after lunchtime, pt's wife was at bedside. Pt endorsed he was in pain. Pt is on DASH/TLC diet. Intake remains varied, as pt's RN endorsed pt ate some fruit for breakfast, pt endorsed he ate most of his lunch today, however pt's RN stated pt ate 0-25% PO overall yesterday. Per EMR, last BM 4/5 per chart. Nutrition-related labs: elevated BUN (30), Cr (3.01), low ALT (7), low eGFR (24). Pt has not been receiving the Nepro ONS that were previously recommended, however pt endorsed he enjoys these ONS, RD to recommend and RD to remain available per protocol. Please refer to full recs below.    Previous Nutrition Dx: Inadequate Energy Intake r/t inability to meet EER on current diet AEB intake <75% EER    Active [ X ]  Resolved [   ]    Goal: Consistently meets > 75% EER via most appropriate route of nutrition     Recommendations:  1. Continue align nutrition with SLP recs at all time  2. Continue current diet order, consider liberalizing d/t varied PO intakes   3. Add Nepro Carbsteady ONS TID (1260Kcal, 57g protein provided)  >> if poor PO intake/tolerance continues, please restart TF  4. Monitor PO intake closely; honor pt food preferences as able  5. Provide max encouragement with all meals   6. Monitor wts, skin integrity, chemistry, GI fxn  7. Pain/bowel regimen per team   6. RD to remain available for recs adjustment prn or will follow up pt per organizational policy     Education: deferred     Risk Level: High [ X ] Moderate [   ] Low [   ] Admitting Diagnosis:   Patient is a 53y old  Male who presents with a chief complaint of expanding aortic arch and descending aortic aneurysm (06 Apr 2023 09:07)  PAST MEDICAL & SURGICAL HISTORY:  HTN (hypertension)  Aortic dissection  CAD (coronary artery disease)  Seizure disorder  S/P aortic bifurcation bypass graft  S/P CABG x 1    Current Nutrition Order:  Diet, DASH/TLC:   Sodium & Cholesterol Restricted (04-05-23 @ 17:21)    PO Intake: Good (%) [   ]  Fair (50-75%) [   ] Poor (<25%) [   ]    GI Issues:     Pain:    Skin Integrity:    Labs:   04-06    136  |  101  |  38<H>  ----------------------------<  76  4.0   |  26  |  3.01<H>    Ca    8.5      06 Apr 2023 04:08  Phos  4.4     04-06  Mg     2.0     04-06    TPro  5.6<L>  /  Alb  2.3<L>  /  TBili  0.4  /  DBili  x   /  AST  19  /  ALT  7<L>  /  AlkPhos  79  04-06    CAPILLARY BLOOD GLUCOSE    POCT Blood Glucose.: 80 mg/dL (06 Apr 2023 13:32)  POCT Blood Glucose.: 84 mg/dL (06 Apr 2023 06:02)  POCT Blood Glucose.: 89 mg/dL (05 Apr 2023 21:44)  POCT Blood Glucose.: 80 mg/dL (05 Apr 2023 15:33)    Medications:  MEDICATIONS  (STANDING):  aspirin  chewable 81 milliGRAM(s) Oral daily  buMETAnide Injectable 2 milliGRAM(s) IV Push every 8 hours  ceFAZolin   IVPB 1000 milliGRAM(s) IV Intermittent every 12 hours  chlorhexidine 2% Cloths 1 Application(s) Topical daily  collagenase Ointment 1 Application(s) Topical daily  dextrose 5%. 1000 milliLiter(s) (50 mL/Hr) IV Continuous <Continuous>  dextrose 5%. 1000 milliLiter(s) (100 mL/Hr) IV Continuous <Continuous>  dextrose 50% Injectable 50 milliLiter(s) IV Push every 15 minutes  dextrose 50% Injectable 25 milliLiter(s) IV Push every 15 minutes  divalproex  milliGRAM(s) Oral <User Schedule>  glucagon  Injectable 1 milliGRAM(s) IntraMuscular once  heparin   Injectable 5000 Unit(s) SubCutaneous every 8 hours  insulin lispro (ADMELOG) corrective regimen sliding scale   SubCutaneous Before meals and at bedtime  lacosamide 100 milliGRAM(s) Oral two times a day  lidocaine   4% Patch 1 Patch Transdermal every 24 hours  lidocaine 2% Jelly 10 milliLiter(s) IntraUrethral once  melatonin 5 milliGRAM(s) Oral at bedtime  metoprolol tartrate 25 milliGRAM(s) Oral every 8 hours  multivitamin/minerals/iron Oral Solution (CENTRUM) 15 milliLiter(s) Oral daily  pantoprazole    Tablet 40 milliGRAM(s) Oral before breakfast  sodium chloride 0.9%. 1000 milliLiter(s) (10 mL/Hr) IV Continuous <Continuous>    MEDICATIONS  (PRN):  acetaminophen     Tablet .. 650 milliGRAM(s) Oral every 6 hours PRN Mild Pain (1 - 3)  dextrose Oral Gel 15 Gram(s) Oral once PRN Blood Glucose LESS THAN 70 milliGRAM(s)/deciliter  oxyCODONE    IR 5 milliGRAM(s) Oral every 4 hours PRN Moderate Pain (4 - 6)    Dosing Anthropometrics:   Height for BMI (FEET)	6 Feet  Height for BMI (INCHES)	0 Inch(s)  Height for BMI (CM)	182.88 Centimeter(s)  Weight for BMI (lbs)	170 lb  Weight for BMI (kg)	77.1 kg  Body Mass Index	              23     Weight Change: no new weights since previous follow up eval;  previous weights: 4/1 88.8 kg; 4/1 86.8kg - received HD, wt fluctuations related to fluid shifts as expected    Nutrition Focused Physical Exam: Completed [   ]  Not Pertinent [   ]  Muscle Wasting- Temporal [   ]  Clavicle/Pectoral [   ]  Shoulder/Deltoid [   ]  Scapula [   ]  Interosseous [   ]  Quadriceps [   ]  Gastrocnemius [   ]  Fat Wasting- Orbital [   ]  Buccal [   ]  Triceps [   ]  Rib [   ]  Suspect [PCM] 2/2 to physical assessment, [poor intake], and [wt loss]; please see malnutrition chart note.    Estimated energy needs:   Estimated Energy Needs Weight (lbs)	178 lb  Estimated Energy Needs Weight (kg)	80.7 kg  Estimated Energy Needs From (ifeanyi/kg)	25   Estimated Energy Needs To (ifeanyi/kg)	30   Estimated Energy Needs Calculated From (ifeanyi/kg)	2017   Estimated Energy Needs Calculated To (ifeanyi/kg)	2421     Estimated Protein Needs Weight (lbs)	178 lb  Estimated Protein Needs Weight (kg)	80.7 kg  Estimated Protein Needs From (g/kg)	1.2  Estimated Protein Needs To (g/kg)	1.4  Estimated Protein Needs Calculated From (g/kg)	97.2  Estimated Protein Needs Calculated To (g/kg)	113    Subjective: Pt is a 53yr old male with PMH HTN, type A dissection sp Dacron grafts and AV resuspension (2013), CAD sp CABG x 1 ([facility], 5/2013, SVG-RCA), seizures, admitted for surgical mgmt of progression of aneurysmal disease. Patient underwent replacement of transverse aortic arch, second stage thoracic endovascular aortic repair, aorto-axillary bypass, AV replacement (bio 23mm), and CABG x 1 (SVG-RCA) EF 75% with Dr. Pierre 3/20. Patient received 7 units pRBC, 6 FFP, 4 plt, 15 cryo, 1000 FEIBA, and uo 1300cc. Started on lasix infusion and phenylephrine, bicarb, Armaan, levo and vaso, with lactic acidosis. 3/21 L femoral HD cath placed and CVVHD started with improvement in acidosis. Pressors off by end of day. Primacor weaned overnight. Patient woke up and follows commands, exhibited facial twitching cw prior seizures per wife and ativan given followed by vEEG placement. Neurology consulted for recs given subtherapeutic AED levels. Given 2 units pRBC. 3/22 Neurology recommended dc vEEG given low suspicion for seizures. Fluid removal initiated with CVVHD. Overnight poor oxygenation requiring bronchoscopy, on laquita and vaso, D10 started for hypoglycemia. 3/23 large residuals, reglan started. CTH performed for poor mentation-no bleed. Amio given for afib. Received 1 unit pRBC and 1 plt, femoral a line removed. CVVHD stopped and diuretic initiated with good response. Primacor turned off. 3/25 new TLC and R cordis with swan placed, tolerated cpap. D10 for hypoglycemia. Underwent aquaphoresis via new J HD cath. Now with stable lytes, adequate clearance and stable renal function, responding well with Bumex 2mg Q12hrs. 4/1: HD Aquapheresis in between for volume removal. Started back on PO diet. Pt's tube feedings have been discontinued on 4/1/23, pt's diet has been restarted on 4/1/23, calorie count started on 4/1/23. HD on 4/1/23. Collegenase ordered for L knee tear 4/4/23.     Visited pt at bedside this morning, pt was lethargic and unable to participate in exam. RD attempted to visit pt again after lunchtime, pt's wife was at bedside. Pt endorsed he was in pain. Pt is on DASH/TLC diet. Intake remains varied, as pt's RN endorsed pt ate some fruit for breakfast, pt endorsed he ate most of his lunch today, however pt's RN stated pt ate 0-25% PO overall yesterday. Per EMR, last BM 4/5 per chart. Nutrition-related labs: elevated BUN (30), Cr (3.01), low ALT (7), low eGFR (24). Pt has not been receiving the Nepro ONS that were previously recommended, however pt endorsed he enjoys these ONS, RD to recommend and RD to remain available per protocol. Please refer to full recs below.    Previous Nutrition Dx: Inadequate Energy Intake r/t inability to meet EER on current diet AEB intake <75% EER    Active [ X ]  Resolved [   ]    Goal: Consistently meets > 75% EER via most appropriate route of nutrition     Recommendations:  1. Continue align nutrition with SLP recs at all time  2. Continue current diet order, consider liberalizing d/t varied PO intakes   3. Add Nepro Carbsteady ONS TID (1260Kcal, 57g protein provided)  >> if poor PO intake/tolerance continues, please restart TF  4. Monitor PO intake closely; honor pt food preferences as able  5. Provide max encouragement with all meals   6. Monitor wts, skin integrity, chemistry, GI fxn  7. Pain/bowel regimen per team   6. RD to remain available for recs adjustment prn or will follow up pt per organizational policy     Education: RD encouraged pt to increase intakes of meals, ONS/nourishments as able, promoting small frequent meals when appetite is lower. Pt and pt's wife were receptive, verbalized understanding.    Risk Level: High [ X ] Moderate [   ] Low [   ]

## 2023-04-06 NOTE — PROCEDURE NOTE - NSSITEPREP_SKIN_A_CORE
chlorhexidine/Adherence to aseptic technique: hand hygiene prior to donning barriers (gown, gloves), don cap and mask, sterile drape over patient
chlorhexidine
chlorhexidine/Adherence to aseptic technique: hand hygiene prior to donning barriers (gown, gloves), don cap and mask, sterile drape over patient
chlorhexidine

## 2023-04-06 NOTE — PROGRESS NOTE ADULT - SUBJECTIVE AND OBJECTIVE BOX
INTERVAL COURSE   Right apical pigtail placed for worsening pneumothorax  old Right and left pigtails removed   leukocytosis stable, no fevers     VITALS  Vital Signs Last 24 Hrs  T(C): 36.8 (06 Apr 2023 17:25), Max: 36.8 (06 Apr 2023 01:01)  T(F): 98.2 (06 Apr 2023 17:25), Max: 98.2 (06 Apr 2023 01:01)  HR: 101 (06 Apr 2023 21:00) (93 - 112)  BP: 133/76 (06 Apr 2023 21:00) (115/70 - 157/93)  BP(mean): 93 (06 Apr 2023 21:00) (87 - 118)  RR: 16 (06 Apr 2023 21:00) (15 - 25)  SpO2: 97% (06 Apr 2023 21:00) (70% - 100%)  Parameters below as of 06 Apr 2023 21:00  Patient On (Oxygen Delivery Method): room air    I&O's Summary    06 Apr 2023 07:01  -  06 Apr 2023 22:03  --------------------------------------------------------  IN: 81 mL / OUT: 975 mL / NET: -894 mL    PHYSICAL EXAM  General: A&Ox 3; NAD  Respiratory: CTAB/L  Cardiovascular: Sinus tachycardia   Gastrointestinal: Soft; NTND   Extremities: Warm, 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 1000 milliLiter(s) (50 mL/Hr) IV Continuous <Continuous>  dextrose 5%. 1000 milliLiter(s) (100 mL/Hr) IV Continuous <Continuous>  dextrose 50% Injectable 25 milliLiter(s) IV Push every 15 minutes  dextrose 50% Injectable 50 milliLiter(s) IV Push every 15 minutes  divalproex  milliGRAM(s) Oral <User Schedule>  glucagon  Injectable 1 milliGRAM(s) IntraMuscular once  heparin   Injectable 5000 Unit(s) SubCutaneous every 8 hours  insulin lispro (ADMELOG) corrective regimen sliding scale   SubCutaneous Before meals and at bedtime  lacosamide 100 milliGRAM(s) Oral two times a day  lidocaine   4% Patch 1 Patch Transdermal every 24 hours  lidocaine 2% Jelly 10 milliLiter(s) IntraUrethral once  melatonin 5 milliGRAM(s) Oral at bedtime  metoprolol tartrate 25 milliGRAM(s) Oral every 8 hours  multivitamin/minerals/iron Oral Solution (CENTRUM) 15 milliLiter(s) Oral daily  pantoprazole    Tablet 40 milliGRAM(s) Oral before breakfast  sodium chloride 0.9%. 1000 milliLiter(s) (10 mL/Hr) IV Continuous <Continuous>    MEDICATIONS  (PRN):  acetaminophen     Tablet .. 650 milliGRAM(s) Oral every 6 hours PRN Mild Pain (1 - 3)  dextrose Oral Gel 15 Gram(s) Oral once PRN Blood Glucose LESS THAN 70 milliGRAM(s)/deciliter  oxyCODONE    IR 5 milliGRAM(s) Oral every 4 hours PRN Moderate Pain (4 - 6)      LABS                        10.0   22.79 )-----------( 293      ( 06 Apr 2023 04:08 )             30.7     04-06    136  |  101  |  38<H>  ----------------------------<  76  4.0   |  26  |  3.01<H>    Ca    8.5      06 Apr 2023 04:08  Phos  4.4     04-06  Mg     2.0     04-06    TPro  5.6<L>  /  Alb  2.3<L>  /  TBili  0.4  /  DBili  x   /  AST  19  /  ALT  7<L>  /  AlkPhos  79  04-06    LIVER FUNCTIONS - ( 06 Apr 2023 04:08 )  Alb: 2.3 g/dL / Pro: 5.6 g/dL / ALK PHOS: 79 U/L / ALT: 7 U/L / AST: 19 U/L / GGT: x           PT/INR - ( 06 Apr 2023 04:08 )   PT: 14.8 sec;   INR: 1.24          PTT - ( 06 Apr 2023 04:08 )  PTT:31.1 sec    IMAGING/EKG/ETC  Improving right sided pneumothorax

## 2023-04-07 LAB
ALBUMIN SERPL ELPH-MCNC: 2.2 G/DL — LOW (ref 3.3–5)
ALP SERPL-CCNC: 82 U/L — SIGNIFICANT CHANGE UP (ref 40–120)
ALT FLD-CCNC: <5 U/L — LOW (ref 10–45)
ANION GAP SERPL CALC-SCNC: 10 MMOL/L — SIGNIFICANT CHANGE UP (ref 5–17)
APTT BLD: 30.6 SEC — SIGNIFICANT CHANGE UP (ref 27.5–35.5)
AST SERPL-CCNC: 17 U/L — SIGNIFICANT CHANGE UP (ref 10–40)
BILIRUB SERPL-MCNC: 0.4 MG/DL — SIGNIFICANT CHANGE UP (ref 0.2–1.2)
BUN SERPL-MCNC: 44 MG/DL — HIGH (ref 7–23)
CALCIUM SERPL-MCNC: 8.5 MG/DL — SIGNIFICANT CHANGE UP (ref 8.4–10.5)
CHLORIDE SERPL-SCNC: 102 MMOL/L — SIGNIFICANT CHANGE UP (ref 96–108)
CO2 SERPL-SCNC: 25 MMOL/L — SIGNIFICANT CHANGE UP (ref 22–31)
CORTICOSTEROID BINDING GLOBULIN RESULT: 1.1 MG/DL — LOW
CORTIS F/TOTAL MFR SERPL: 70 % — SIGNIFICANT CHANGE UP
CORTIS SERPL-MCNC: 25 UG/DL — HIGH
CORTISOL, FREE RESULT: 18 UG/DL — HIGH
CREAT SERPL-MCNC: 3.1 MG/DL — HIGH (ref 0.5–1.3)
EGFR: 23 ML/MIN/1.73M2 — LOW
GLUCOSE BLDC GLUCOMTR-MCNC: 112 MG/DL — HIGH (ref 70–99)
GLUCOSE BLDC GLUCOMTR-MCNC: 71 MG/DL — SIGNIFICANT CHANGE UP (ref 70–99)
GLUCOSE BLDC GLUCOMTR-MCNC: 72 MG/DL — SIGNIFICANT CHANGE UP (ref 70–99)
GLUCOSE BLDC GLUCOMTR-MCNC: 84 MG/DL — SIGNIFICANT CHANGE UP (ref 70–99)
GLUCOSE SERPL-MCNC: 93 MG/DL — SIGNIFICANT CHANGE UP (ref 70–99)
HCT VFR BLD CALC: 28.8 % — LOW (ref 39–50)
HGB BLD-MCNC: 9.4 G/DL — LOW (ref 13–17)
INR BLD: 1.18 — HIGH (ref 0.88–1.16)
MAGNESIUM SERPL-MCNC: 2 MG/DL — SIGNIFICANT CHANGE UP (ref 1.6–2.6)
MCHC RBC-ENTMCNC: 30.8 PG — SIGNIFICANT CHANGE UP (ref 27–34)
MCHC RBC-ENTMCNC: 32.6 GM/DL — SIGNIFICANT CHANGE UP (ref 32–36)
MCV RBC AUTO: 94.4 FL — SIGNIFICANT CHANGE UP (ref 80–100)
NRBC # BLD: 0 /100 WBCS — SIGNIFICANT CHANGE UP (ref 0–0)
PHOSPHATE SERPL-MCNC: 5 MG/DL — HIGH (ref 2.5–4.5)
PLATELET # BLD AUTO: 269 K/UL — SIGNIFICANT CHANGE UP (ref 150–400)
POTASSIUM SERPL-MCNC: 4.5 MMOL/L — SIGNIFICANT CHANGE UP (ref 3.5–5.3)
POTASSIUM SERPL-SCNC: 4.5 MMOL/L — SIGNIFICANT CHANGE UP (ref 3.5–5.3)
PROT SERPL-MCNC: 5.5 G/DL — LOW (ref 6–8.3)
PROTHROM AB SERPL-ACNC: 14.1 SEC — HIGH (ref 10.5–13.4)
RBC # BLD: 3.05 M/UL — LOW (ref 4.2–5.8)
RBC # FLD: 14.7 % — HIGH (ref 10.3–14.5)
SODIUM SERPL-SCNC: 137 MMOL/L — SIGNIFICANT CHANGE UP (ref 135–145)
WBC # BLD: 20.42 K/UL — HIGH (ref 3.8–10.5)
WBC # FLD AUTO: 20.42 K/UL — HIGH (ref 3.8–10.5)

## 2023-04-07 RX ORDER — METOPROLOL TARTRATE 50 MG
25 TABLET ORAL EVERY 6 HOURS
Refills: 0 | Status: DISCONTINUED | OUTPATIENT
Start: 2023-04-07 | End: 2023-04-09

## 2023-04-07 RX ORDER — METOCLOPRAMIDE HCL 10 MG
10 TABLET ORAL ONCE
Refills: 0 | Status: COMPLETED | OUTPATIENT
Start: 2023-04-07 | End: 2023-04-07

## 2023-04-07 RX ADMIN — BUMETANIDE 2 MILLIGRAM(S): 0.25 INJECTION INTRAMUSCULAR; INTRAVENOUS at 06:24

## 2023-04-07 RX ADMIN — OXYCODONE HYDROCHLORIDE 5 MILLIGRAM(S): 5 TABLET ORAL at 18:30

## 2023-04-07 RX ADMIN — DIVALPROEX SODIUM 750 MILLIGRAM(S): 500 TABLET, DELAYED RELEASE ORAL at 06:29

## 2023-04-07 RX ADMIN — Medication 100 MILLIGRAM(S): at 17:18

## 2023-04-07 RX ADMIN — Medication 5 MILLIGRAM(S): at 21:34

## 2023-04-07 RX ADMIN — Medication 25 MILLIGRAM(S): at 17:17

## 2023-04-07 RX ADMIN — Medication 25 MILLIGRAM(S): at 06:23

## 2023-04-07 RX ADMIN — Medication 100 MILLIGRAM(S): at 06:24

## 2023-04-07 RX ADMIN — Medication 25 MILLIGRAM(S): at 11:51

## 2023-04-07 RX ADMIN — Medication 650 MILLIGRAM(S): at 21:35

## 2023-04-07 RX ADMIN — OXYCODONE HYDROCHLORIDE 5 MILLIGRAM(S): 5 TABLET ORAL at 04:10

## 2023-04-07 RX ADMIN — Medication 1 APPLICATION(S): at 11:52

## 2023-04-07 RX ADMIN — LACOSAMIDE 100 MILLIGRAM(S): 50 TABLET ORAL at 17:17

## 2023-04-07 RX ADMIN — PANTOPRAZOLE SODIUM 40 MILLIGRAM(S): 20 TABLET, DELAYED RELEASE ORAL at 06:23

## 2023-04-07 RX ADMIN — Medication 650 MILLIGRAM(S): at 21:54

## 2023-04-07 RX ADMIN — Medication 10 MILLIGRAM(S): at 10:55

## 2023-04-07 RX ADMIN — OXYCODONE HYDROCHLORIDE 5 MILLIGRAM(S): 5 TABLET ORAL at 03:12

## 2023-04-07 RX ADMIN — LIDOCAINE 1 PATCH: 4 CREAM TOPICAL at 06:47

## 2023-04-07 RX ADMIN — Medication 81 MILLIGRAM(S): at 11:51

## 2023-04-07 RX ADMIN — Medication 15 MILLILITER(S): at 13:17

## 2023-04-07 RX ADMIN — LACOSAMIDE 100 MILLIGRAM(S): 50 TABLET ORAL at 06:23

## 2023-04-07 RX ADMIN — OXYCODONE HYDROCHLORIDE 5 MILLIGRAM(S): 5 TABLET ORAL at 19:30

## 2023-04-07 RX ADMIN — HEPARIN SODIUM 5000 UNIT(S): 5000 INJECTION INTRAVENOUS; SUBCUTANEOUS at 15:14

## 2023-04-07 RX ADMIN — DIVALPROEX SODIUM 750 MILLIGRAM(S): 500 TABLET, DELAYED RELEASE ORAL at 17:18

## 2023-04-07 RX ADMIN — LIDOCAINE 1 PATCH: 4 CREAM TOPICAL at 10:35

## 2023-04-07 RX ADMIN — CHLORHEXIDINE GLUCONATE 1 APPLICATION(S): 213 SOLUTION TOPICAL at 06:29

## 2023-04-07 RX ADMIN — HEPARIN SODIUM 5000 UNIT(S): 5000 INJECTION INTRAVENOUS; SUBCUTANEOUS at 21:34

## 2023-04-07 NOTE — PROGRESS NOTE ADULT - SUBJECTIVE AND OBJECTIVE BOX
--------------------------------------------------------------------------------  Chief Complaint: ESRD/Ongoing hemodialysis requirement    24 hour events/subjective:    Seen this AM, remains stable. U/O 1.1L. Discussed with CTICU can give patient line holiday and observe on diuretics. Hopeful for some recovery with clearance. If not, will need another line placed team in agreement    PAST HISTORY  --------------------------------------------------------------------------------  No significant changes to PMH, PSH, FHx, SHx, unless otherwise noted    ALLERGIES & MEDICATIONS  --------------------------------------------------------------------------------  Allergies    No Known Allergies    Intolerances      Standing Inpatient Medications  aspirin  chewable 81 milliGRAM(s) Oral daily  buMETAnide Injectable 2 milliGRAM(s) IV Push daily  ceFAZolin   IVPB 1000 milliGRAM(s) IV Intermittent every 12 hours  chlorhexidine 2% Cloths 1 Application(s) Topical daily  collagenase Ointment 1 Application(s) Topical daily  dextrose 5%. 1000 milliLiter(s) IV Continuous <Continuous>  dextrose 5%. 1000 milliLiter(s) IV Continuous <Continuous>  dextrose 50% Injectable 50 milliLiter(s) IV Push every 15 minutes  dextrose 50% Injectable 25 milliLiter(s) IV Push every 15 minutes  divalproex  milliGRAM(s) Oral <User Schedule>  glucagon  Injectable 1 milliGRAM(s) IntraMuscular once  heparin   Injectable 5000 Unit(s) SubCutaneous every 8 hours  insulin lispro (ADMELOG) corrective regimen sliding scale   SubCutaneous Before meals and at bedtime  lacosamide 100 milliGRAM(s) Oral two times a day  lidocaine   4% Patch 1 Patch Transdermal every 24 hours  melatonin 5 milliGRAM(s) Oral at bedtime  metoprolol tartrate 25 milliGRAM(s) Oral every 6 hours  multivitamin/minerals/iron Oral Solution (CENTRUM) 15 milliLiter(s) Oral daily  pantoprazole    Tablet 40 milliGRAM(s) Oral before breakfast  sodium chloride 0.9%. 1000 milliLiter(s) IV Continuous <Continuous>    PRN Inpatient Medications  acetaminophen     Tablet .. 650 milliGRAM(s) Oral every 6 hours PRN  dextrose Oral Gel 15 Gram(s) Oral once PRN  oxyCODONE    IR 5 milliGRAM(s) Oral every 4 hours PRN      REVIEW OF SYSTEMS  --------------------------------------------------------------------------------  All other systems were reviewed and are negative, except as noted.    VITALS/PHYSICAL EXAM  --------------------------------------------------------------------------------  T(C): 36.3 (04-07-23 @ 09:45), Max: 37.3 (04-06-23 @ 22:46)  HR: 110 (04-07-23 @ 12:00) (87 - 110)  BP: 137/82 (04-07-23 @ 12:00) (113/66 - 147/96)  RR: 18 (04-07-23 @ 12:00) (13 - 35)  SpO2: 100% (04-07-23 @ 12:00) (66% - 100%)  Wt(kg): --  Drug Dosing Weight  Height (cm): 182.9 (20 Mar 2023 07:23)  Weight (kg): 77.1 (20 Mar 2023 07:23)  BMI (kg/m2): 23 (20 Mar 2023 07:23)  BSA (m2): 1.99 (20 Mar 2023 07:23)        04-06-23 @ 07:01  -  04-07-23 @ 07:00  --------------------------------------------------------  IN: 613 mL / OUT: 1220 mL / NET: -607 mL    04-07-23 @ 07:01  -  04-07-23 @ 12:20  --------------------------------------------------------  IN: 0 mL / OUT: 340 mL / NET: -340 mL      PHYSICAL EXAM:  GENERAL: NAD   NECK: supple, No JVD  CHEST/LUNG: CTLA B/L, on RA  HEART: normal S1S2, RRR  ABDOMEN: Soft, Nontender, +BS, No flank tenderness bilateral  EXTREMITIES: No clubbing, cyanosis, or edema   NEUROLOGY: Asnwers questions, no focal neurological deficit  ACCESS: Rt IJ S/Q HD Cath c/d/i  <- Can be removed today    LABS/STUDIES  --------------------------------------------------------------------------------              9.4    20.42 >-----------<  269      [04-07-23 @ 03:25]              28.8     137  |  102  |  44  ----------------------------<  93      [04-07-23 @ 03:25]  4.5   |  25  |  3.10        Ca     8.5     [04-07-23 @ 03:25]      Mg     2.0     [04-07-23 @ 03:25]      Phos  5.0     [04-07-23 @ 03:25]    TPro  5.5  /  Alb  2.2  /  TBili  0.4  /  DBili  x   /  AST  17  /  ALT  <5  /  AlkPhos  82  [04-07-23 @ 03:25]    PT/INR: PT 14.1 , INR 1.18       [04-07-23 @ 03:25]  PTT: 30.6       [04-07-23 @ 03:25]      TSH 0.626      [03-09-23 @ 12:54]  Lipid: chol 130, TG 85, HDL 43, LDL --      [03-09-23 @ 10:04]    HBsAb Nonreact      [03-30-23 @ 12:02]  HBsAg Nonreact      [03-09-23 @ 12:54]  HCV 0.29, Nonreact      [03-30-23 @ 12:02]      RADIOLOGY:  --------------------------------------------------------------------------------------

## 2023-04-07 NOTE — PROGRESS NOTE ADULT - SUBJECTIVE AND OBJECTIVE BOX
CTICU  CRITICAL  CARE  attending     Hand off received 					   Pertinent clinical, laboratory, radiographic, hemodynamic, echocardiographic, respiratory data, microbiologic data and chart were reviewed and analyzed frequently throughout the course of the day and night  Patient seen and examined with CTS/ SH attending at bedside      52M s/p S/p AVR/ Arch replacement/ aorto-axillary bypass- CABG x 1 and TEVAR   3/20    52 year old male, current every day marijuana use w/pmh of HTN, type A aortic dissection s/p Dacron grafts, AV resuspension in 2013,CAD s/p CABG x 1 SVG to RCA (5/2013 with Dr. Medina), seizure disorder (last episode on 7/4/22) presents for a follow up visit for evaluation and management of his aortic pathology and deemed a candidate for a reop total arch replacement given aortic aneurysm and chronic dissection.  S/p AVR/ Arch replacement/ aorto-axillary bypass- CABG x 1 and TEVAR   3/20  Postop course with worsening renal failure and sig acidosis started on CVVHD  Afib 3/23  Extubated 03/29  completed 7 day course of zosyn for serratia VAP last day 4/2     currently:    hemodynamically stable  hemodialysis on hold; native renal fn improving      , FAMILY HISTORY:  No pertinent family history in first degree relatives    PAST MEDICAL & SURGICAL HISTORY:  HTN (hypertension)      Aortic dissection      CAD (coronary artery disease)      Seizure disorder      S/P aortic bifurcation bypass graft      S/P CABG x 1        Patient is a 53y old  Male who presents with a chief complaint of expanding aortic arch and descending aortic aneurysm (06 Apr 2023 22:02)      14 system review limited 2/2 post op morbidity  acute changes include acute respiratory failure  Vital signs, hemodynamic and respiratory parameters were reviewed from the bedside nursing flowsheet.  ICU Vital Signs Last 24 Hrs  T(C): 36.3 (07 Apr 2023 09:45), Max: 37.3 (06 Apr 2023 22:46)  T(F): 97.3 (07 Apr 2023 09:45), Max: 99.2 (06 Apr 2023 22:46)  HR: 110 (07 Apr 2023 12:00) (87 - 110)  BP: 137/82 (07 Apr 2023 12:00) (113/66 - 147/96)  BP(mean): 105 (07 Apr 2023 12:00) (83 - 117)  ABP: --  ABP(mean): --  RR: 18 (07 Apr 2023 12:00) (13 - 35)  SpO2: 100% (07 Apr 2023 12:00) (66% - 100%)    O2 Parameters below as of 07 Apr 2023 12:00  Patient On (Oxygen Delivery Method): room air          Adult Advanced Hemodynamics Last 24 Hrs  CVP(mm Hg): --  CVP(cm H2O): --  CO: --  CI: --  PA: --  PA(mean): --  PCWP: --  SVR: --  SVRI: --  PVR: --  PVRI: --,     Intake and output was reviewed and the fluid balance was calculated  Daily     Daily   I&O's Summary    06 Apr 2023 07:01  -  07 Apr 2023 07:00  --------------------------------------------------------  IN: 613 mL / OUT: 1220 mL / NET: -607 mL    07 Apr 2023 07:01  -  07 Apr 2023 12:10  --------------------------------------------------------  IN: 0 mL / OUT: 340 mL / NET: -340 mL        All lines and drain sites were assessed  Glycemic trend was reviewedCAPILLARY BLOOD GLUCOSE      POCT Blood Glucose.: 72 mg/dL (07 Apr 2023 11:05)    No acute change in mental status  Auscultation of the chest reveals equal bs  Abdomen is soft  Extremities are warm and well perfused  Wounds appear clean and unremarkable  Antibiotics are periop    labs  CBC Full  -  ( 07 Apr 2023 03:25 )  WBC Count : 20.42 K/uL  RBC Count : 3.05 M/uL  Hemoglobin : 9.4 g/dL  Hematocrit : 28.8 %  Platelet Count - Automated : 269 K/uL  Mean Cell Volume : 94.4 fl  Mean Cell Hemoglobin : 30.8 pg  Mean Cell Hemoglobin Concentration : 32.6 gm/dL  Auto Neutrophil # : x  Auto Lymphocyte # : x  Auto Monocyte # : x  Auto Eosinophil # : x  Auto Basophil # : x  Auto Neutrophil % : x  Auto Lymphocyte % : x  Auto Monocyte % : x  Auto Eosinophil % : x  Auto Basophil % : x    04-07    137  |  102  |  44<H>  ----------------------------<  93  4.5   |  25  |  3.10<H>    Ca    8.5      07 Apr 2023 03:25  Phos  5.0     04-07  Mg     2.0     04-07    TPro  5.5<L>  /  Alb  2.2<L>  /  TBili  0.4  /  DBili  x   /  AST  17  /  ALT  <5<L>  /  AlkPhos  82  04-07    PT/INR - ( 07 Apr 2023 03:25 )   PT: 14.1 sec;   INR: 1.18          PTT - ( 07 Apr 2023 03:25 )  PTT:30.6 sec  The current medications were reviewed   MEDICATIONS  (STANDING):  aspirin  chewable 81 milliGRAM(s) Oral daily  buMETAnide Injectable 2 milliGRAM(s) IV Push daily  ceFAZolin   IVPB 1000 milliGRAM(s) IV Intermittent every 12 hours  chlorhexidine 2% Cloths 1 Application(s) Topical daily  collagenase Ointment 1 Application(s) Topical daily  dextrose 5%. 1000 milliLiter(s) (50 mL/Hr) IV Continuous <Continuous>  dextrose 5%. 1000 milliLiter(s) (100 mL/Hr) IV Continuous <Continuous>  dextrose 50% Injectable 50 milliLiter(s) IV Push every 15 minutes  dextrose 50% Injectable 25 milliLiter(s) IV Push every 15 minutes  divalproex  milliGRAM(s) Oral <User Schedule>  glucagon  Injectable 1 milliGRAM(s) IntraMuscular once  heparin   Injectable 5000 Unit(s) SubCutaneous every 8 hours  insulin lispro (ADMELOG) corrective regimen sliding scale   SubCutaneous Before meals and at bedtime  lacosamide 100 milliGRAM(s) Oral two times a day  lidocaine   4% Patch 1 Patch Transdermal every 24 hours  melatonin 5 milliGRAM(s) Oral at bedtime  metoprolol tartrate 25 milliGRAM(s) Oral every 6 hours  multivitamin/minerals/iron Oral Solution (CENTRUM) 15 milliLiter(s) Oral daily  pantoprazole    Tablet 40 milliGRAM(s) Oral before breakfast  sodium chloride 0.9%. 1000 milliLiter(s) (10 mL/Hr) IV Continuous <Continuous>    MEDICATIONS  (PRN):  acetaminophen     Tablet .. 650 milliGRAM(s) Oral every 6 hours PRN Mild Pain (1 - 3)  dextrose Oral Gel 15 Gram(s) Oral once PRN Blood Glucose LESS THAN 70 milliGRAM(s)/deciliter  oxyCODONE    IR 5 milliGRAM(s) Oral every 4 hours PRN Moderate Pain (4 - 6)       PROBLEM LIST/ ASSESSMENT:  HEALTH ISSUES - PROBLEM Dx:      ,   Patient is a 53y old  Male who presents with a chief complaint of expanding aortic arch and descending aortic aneurysm (06 Apr 2023 22:02)     s/p AVR/ Arch replacement/ aorto-axillary bypass- CABG x 1 and TEVAR   acute changes include acute respiratory failure    My plan includes :    Maintain MAP >75-80  F/u with renal svc  Optimize PT    close hemodynamic, ventilatory and drain monitoring and management per post op routine    Monitor for arrhythmias and monitor parameters for organ perfusion  monitor neurologic status  Head of the bed should remain elevated to 45 deg .   chest PT and IS will be encouraged  monitor adequacy of oxygenation and ventilation and attempt to wean oxygen  Nutritional goals will be met using po eventually , ensure adequate caloric intake and montior the same  Stress ulcer and VTE prophylaxis will be achieved    Glycemic control is satisfactory  Electrolytes have been repleted as necessary and wound care has been carried out. Pain control has been achieved.   agressive physical therapy and early mobility and ambulation goals will be met   The family was updated about the course and plan  CRITICAL CARE TIME SPENT in evaluation and management, reassessments, review and interpretation of labs and x-rays, ventilator and hemodynamic management, formulating a plan and coordinating care: __30___ MIN.  Time does not include procedural time.  CTICU ATTENDING     					    Dalton Martinez M.D

## 2023-04-07 NOTE — PROGRESS NOTE ADULT - ASSESSMENT
52 yo M with non-oliguric iATN i/s/o replacement of aortic arch and TAVR on 3/20- prolonged OR time and massive transfusion and other blood products. Initiated on CVVHD (3/21-3/24) for clearance and volume. Started on diuresis on 3/24 with good response but remained vol overloaded, so started iHD 3/27 for volume and intermittent AQ on 3/31 now back on diuretic challenge    Assessment/Plan:   #Non-oliguric iATN due to intra-op hemodynamic changes, dialysis dependent  BCr 1.2, eGFR 67 (3/9)  UA (3/25) w/ proteinuria and mod blood w/o RBCs.  Showing some ATN recovery, U/O improving  CVVHD (3/21- 3/24)  AQ (3/31-4/2)    Plan:  Continue with bumex 2mg daily  Can remove temp line today and give line holiday  strict I&Os  BMP per icu protocol  Maintain MAP >70 for renal perfusion     #Anemia  -Check iron profile and ferritin    Discussed w/ CTICU team

## 2023-04-07 NOTE — PROGRESS NOTE ADULT - ATTENDING COMMENTS
reviewed CVVHD again today  tolerating well  c/w CVVHD prescription as outlined above
52 yo man with probable iATN in setting of replacement of aortic arch and TAVR on 3/20- prolonged OR time and massive transfusion and other blood products. immediate post op had lactic acidosis and low serum bicarbonate and hypophosphatemia.  CVVHD started 3/21 continued through midday 3/24. Acute HD treatment 3/27  extubated and on high flow NC O2.    iHD yesterday achieved target UF, but with some drops in BP and UO- received albumin and PRBCs  this AM BP back in 1130-140 range- urine still < prior days  if UO does not improve now that BP better, would start aquapheresis to gently remove fluid while maintaining optimal BP  may need another HD tomorrow, 4/1 or on 4/3  for close follow up    reviewed in detail with CTS team      no underlying CKD.  underlying h/o HTN, seizure disorder.
54 yo man with probable iATN in setting of replacement of aortic arch and TAVR on 3/20- prolonged OR time and massive transfusion and other blood products. immediate post op had lactic acidosis and low serum bicarbonate and hypophosphatemia.  CVVHD started 3/21 continued through midday 3/24  additional HD yesterday mostly for UF- tolerated adequately but with some drop in BP during the treatment  also note drop in UO - and some increase HR- may now be pulling fluid off faster than his plasma refill rate-   would try albumin to optimize diuresis and renal perfusion  no dialysis planned for today    electrolytes acceptable      no underlying CKD.  underlying h/o HTN, seizure disorder.
54 yo man with probable iATN in setting of replacement of aortic arch and TAVR on 3/20- prolonged OR time and massive transfusion and other blood products. immediate post op had lactic acidosis and low serum bicarbonate and hypophosphatemia.  CVVHD started 3/21 continued through midday 3/24. Acute HD treatment 3/27  extubated and on high flow NC O2.  Peak creat 2 days post HD < prior peak 3/27 preHD indicating some ongoing recovery of renal function  defer HD today  follow trend in creat and I/O  now on bumex Q12 from Q8- okay- but give at q8 interval later today if need to generate greater negative fluid balance  c/w albumin of has dips in UO and/or BP. May need help with plasma refill rate   no dialysis planned for today  electrolytes acceptable      no underlying CKD.  underlying h/o HTN, seizure disorder.
54 yo man with probable iATN in setting of replacement of aortic arch and TAVR on 3/20- prolonged OR time and massive transfusion and other blood products. immediate post op had lactic acidosis and low serum bicarbonate and hypophosphatemia.  CVVHD started 3/21 continued through midday 3/24. Acute HD treatment 3/27  extubated and on high flow NC O2.  creat down trending indicating some renal recovery, however UO not adequate for his present needs-   repeat HD today for UF and gentle clearance  will need to add colloid- albumin or PRBCs to help sequester 3rd spaced fluid- important to not drop renal perfusion while recovering from ATN  if dropping BP, may be beneficial to convert to aquapheresis for slower, persistent UF rate  to follow up    reviewed in detail with CTS team      no underlying CKD.  underlying h/o HTN, seizure disorder.
54 yo man with probable iATN in setting of replacement of aortic arch and TAVR on 3/20- prolonged OR time and massive transfusion and other blood products. immediate post op had lactic acidosis and low serum bicarbonate and hypophosphatemia.  CVVHD started 3/21 continues with disruption through today- urine output adequate  electrolytes acceptable  reviewed with CTS team at bedside-   will c/w CVVHD through this AM and dc after present dialysate spent-  No plans for additional HD at this time- but [place] follow closely to see if indicated again over next few days  add lasix 100 mg x 1 dose to maintain urine flow  elevated CPK is c/w rhabdo- has not had repeat-  no underlying CKD.  underlying h/o HTN, seizure disorder.
Chart reviewed. Labs appreciated. Pt seen at bedside during renal rounds.  Case d/w Dr. Longoria at length and plan discussed with ICU team.   iHD then resume aquapheresis.   Agree with details of note above as well as A/P.
I agree with the fellow's findings and plans as written above with the following additions/amendments:    Seen and examined at bedside, UOP adequate and no acute indication for HD today. Monitoring closely, sCr is tapering, patient hopeful that with talking today can increase chances of staying off HD. Otherwise no issues. Discussed with CTICU
I agree with the fellow's findings and plans as written above with the following additions/amendments:    Seen and examined at bedside. Diuresing adequately, SOB improved but weak, not walking yet, encouraged to mobilize as much as possible. Further recs as above. Discussed with primary team
I agree with the fellow's findings and plans as written above with the following additions/amendments:    Seen and examined at bedside. Tolerating diuresis well, improved ventillation, will need to increase mobility to continue to mobilize fluids. Otherwise no complaints, continue diuresis as above, discussed with primary team
Interim chart reviewed.  Pt seen at bedside on renal rounds with Dr. Longoria.  HD as outlined above. Case d/w primary team.  Renal service following closely.  Agree with Dr. Longoria's note.  Please call renal service with any questions or concerns.
seen again while on CVVHD-  treatment was held when patient went to CT scan.  hemodynamics okay- will c/w present prescription  c/w albumin  to help sequester third spaced fluid
54 yo man with probable iATN in setting of replacement of aortic arch and TAVR on 3/20- prolonged OR time and massive transfusion and other blood products. immediate post op had lactic acidosis and low serum bicarbonate and hypophosphatemia.  CVVHD started 3/21 continues with disruption through today  seen and evaluated while on CVVHD and with CTS team at bedside  goal is to maintain of UF rate of net 150 ml negative hourly.   Adjusted dialysate to higher K and P (Phoxillum)  underlying h/o HTN, seizure disorder.  elevated CPK is c/w rhabdo- continue to trend  no underlying CKD.
54 yo man with probable iATN in setting of replacement of aortic arch and TAVR on 3/20- prolonged OR time and massive transfusion and other blood products. immediate post op had lactic acidosis and low serum bicarbonate and hypophosphatemia.  CVVHD started 3/21 continued through midday 3/24  nonoliguric and diuresing well on bumex 2q8 with UO 4.8L/24H and net negative balance of  1.9L  electrolytes acceptable  reviewed case in detail with CTS team- creat flat  3.7-3.8 range but CTS prefers additional UF to facilitate extubation-  to arrange for acute HD    no underlying CKD.  underlying h/o HTN, seizure disorder.
54 yo man with probable iATN in setting of replacement of aortic arch and TAVR on 3/20- prolonged OR time and massive transfusion and other blood products. immediate post op had lactic acidosis and low serum bicarbonate and hypophosphatemia.  CVVHD started yesterday AM- continues with disruption through night and today  improved electrolytes and lactic acid levels  underlying h/o HTN, seizure disorder.  elevated CPK is c/w rhabdo  no underlying CKD.  non oliguric    to c/w CVVHD and add UF  to get back to even volume status    reviewed case in detail with CTS team.
I agree with the fellow's findings and plans as written above with the following additions/amendments:    Seen and examined at bedside, discussed TREY and HD at length with patient, more than likely may need further iHD during this admission, no acute indication for HD today. Monitoring closely. Further recs as above, discussed with primary team
I agree with the fellow's findings and plans as written above with the following additions/amendments:    Seen and examined at bedside. No complaints, some pain. Will give diuretics today for mild fluid overload, otherwise continue to encourage PO intake and mobilization as possible. Discussed with primary team
TREY -- now off dialysis and diuresing but creat up   clinically euvolemic by PA pressures and exam  and urine output may be BP dependent per RN   suggest hold diuresis for now -- allow to be even to slt positive  keep SBP>120   follow chem  no need for dialysis now

## 2023-04-07 NOTE — PROGRESS NOTE ADULT - SUBJECTIVE AND OBJECTIVE BOX
CTICU  CRITICAL  CARE  attending     Hand off received 					   Pertinent clinical, laboratory, radiographic, hemodynamic, echocardiographic, respiratory data, microbiologic data and chart were reviewed and analyzed frequently throughout the course of the day and night.       52 year old male with HTN, type A aortic dissection s/p Dacron grafts, AV resuspension in 2013,CAD s/p CABG x 1 SVG to RCA (5/2013 with Dr. Medina), seizure disorder (last episode on 7/4/22).  He is a current every day marijuana user  CTA chest, abdomen and pelvis 11/30/22: Status post graft repair of the ascending aorta and portion of aortic arch.  Dissecting aneurysm of the descending thoracic aorta (measuring up to 5.7 cm) and abdominal aorta (3.5 cm infrarenal), similar to the MR angiogram of 9/19/2022.  Nonocclusive dissection flap present within the innominate artery, aneurysmal right subclavian artery and proximal right common carotid artery as well as aneurysmal left common iliac artery.    He was referred by Dr. Jacqueline Gonsales.  He ws screened for Triomphe study but did not qualify.    S/P AVR  Aortic arch replacement  S/P CABG (SVG to RCA).  S/P Aorto left axillary by pass.  S/P TEVAR to descending aorta.      FAMILY HISTORY:  No pertinent family history in first degree relatives    PAST MEDICAL & SURGICAL HISTORY:  HTN (hypertension)  Aortic dissection  CAD (coronary artery disease)  Seizure disorder  S/P aortic bifurcation bypass graft  S/P CABG x 1      14 system review was unremarkable    Vital signs, hemodynamic and respiratory parameters were reviewed from the bedside nursing flow sheet.  ICU Vital Signs Last 24 Hrs  T(C): 38.3 (07 Apr 2023 21:00), Max: 38.3 (07 Apr 2023 21:00)  T(F): 101 (07 Apr 2023 21:00), Max: 101 (07 Apr 2023 21:00)  HR: 95 (08 Apr 2023 00:00) (87 - 110)  BP: 120/74 (08 Apr 2023 00:00) (105/69 - 171/89)  BP(mean): 92 (08 Apr 2023 00:00) (82 - 118)  ABP: --  ABP(mean): --  RR: 17 (08 Apr 2023 00:00) (13 - 35)  SpO2: 98% (08 Apr 2023 00:00) (66% - 100%)    O2 Parameters below as of 08 Apr 2023 01:00  Patient On (Oxygen Delivery Method): room air          Adult Advanced Hemodynamics Last 24 Hrs  CVP(mm Hg): --  CVP(cm H2O): --  CO: --  CI: --  PA: --  PA(mean): --  PCWP: --  SVR: --  SVRI: --  PVR: --  PVRI: --,     Intake and output was reviewed and the fluid balance was calculated  Daily     Daily   I&O's Summary    06 Apr 2023 07:01  -  07 Apr 2023 07:00  --------------------------------------------------------  IN: 613 mL / OUT: 1220 mL / NET: -607 mL    07 Apr 2023 07:01  -  08 Apr 2023 00:23  --------------------------------------------------------  IN: 0 mL / OUT: 1190 mL / NET: -1190 mL        All lines and drain sites were assessed    Neuro: No focal motor deficit.  Neck: No JVD.  CVS: S1, S2, No S3.  Lungs: Diminished air entry bilaterally.  Abd: Soft. No tenderness. + Bowel sounds.  Vascular: + DP/PT.  Extremities: Superficial laceration in the left knee. Cellulitis of LLE.   Lymphatic: Normal.  Skin: No abnormalities.      labs  CBC Full  -  ( 07 Apr 2023 03:25 )  WBC Count : 20.42 K/uL  RBC Count : 3.05 M/uL  Hemoglobin : 9.4 g/dL  Hematocrit : 28.8 %  Platelet Count - Automated : 269 K/uL  Mean Cell Volume : 94.4 fl  Mean Cell Hemoglobin : 30.8 pg  Mean Cell Hemoglobin Concentration : 32.6 gm/dL  Auto Neutrophil # : x  Auto Lymphocyte # : x  Auto Monocyte # : x  Auto Eosinophil # : x  Auto Basophil # : x  Auto Neutrophil % : x  Auto Lymphocyte % : x  Auto Monocyte % : x  Auto Eosinophil % : x  Auto Basophil % : x    04-07    137  |  102  |  44<H>  ----------------------------<  93  4.5   |  25  |  3.10<H>    Ca    8.5      07 Apr 2023 03:25  Phos  5.0     04-07  Mg     2.0     04-07    TPro  5.5<L>  /  Alb  2.2<L>  /  TBili  0.4  /  DBili  x   /  AST  17  /  ALT  <5<L>  /  AlkPhos  82  04-07    PT/INR - ( 07 Apr 2023 03:25 )   PT: 14.1 sec;   INR: 1.18          PTT - ( 07 Apr 2023 03:25 )  PTT:30.6 sec  The current medications were reviewed   MEDICATIONS  (STANDING):  aspirin  chewable 81 milliGRAM(s) Oral daily  buMETAnide Injectable 2 milliGRAM(s) IV Push daily  ceFAZolin   IVPB 1000 milliGRAM(s) IV Intermittent every 12 hours  chlorhexidine 2% Cloths 1 Application(s) Topical daily  collagenase Ointment 1 Application(s) Topical daily  dextrose 5%. 1000 milliLiter(s) (50 mL/Hr) IV Continuous <Continuous>  dextrose 5%. 1000 milliLiter(s) (100 mL/Hr) IV Continuous <Continuous>  dextrose 50% Injectable 50 milliLiter(s) IV Push every 15 minutes  dextrose 50% Injectable 25 milliLiter(s) IV Push every 15 minutes  divalproex  milliGRAM(s) Oral <User Schedule>  glucagon  Injectable 1 milliGRAM(s) IntraMuscular once  heparin   Injectable 5000 Unit(s) SubCutaneous every 8 hours  insulin lispro (ADMELOG) corrective regimen sliding scale   SubCutaneous Before meals and at bedtime  lacosamide 100 milliGRAM(s) Oral two times a day  lidocaine   4% Patch 1 Patch Transdermal every 24 hours  melatonin 5 milliGRAM(s) Oral at bedtime  metoprolol tartrate 25 milliGRAM(s) Oral every 6 hours  multivitamin/minerals/iron Oral Solution (CENTRUM) 15 milliLiter(s) Oral daily  pantoprazole    Tablet 40 milliGRAM(s) Oral before breakfast  sodium chloride 0.9%. 1000 milliLiter(s) (10 mL/Hr) IV Continuous <Continuous>    MEDICATIONS  (PRN):  acetaminophen     Tablet .. 650 milliGRAM(s) Oral every 6 hours PRN Mild Pain (1 - 3)  dextrose Oral Gel 15 Gram(s) Oral once PRN Blood Glucose LESS THAN 70 milliGRAM(s)/deciliter  oxyCODONE    IR 5 milliGRAM(s) Oral every 4 hours PRN Moderate Pain (4 - 6)        53 year old  Male with expanding aortic arch and descending aortic aneurysm   S/P AVR  Aortic arch replacement  S/P CABG (SVG to RCA).  S/P Aorto left axillary by pass.  S/P TEVAR to descending aorta.  Long postoperative course complicated by vent dependent respiratory failure, renal failure, thrombocytopenia, serratia pneumonia, bilateral pleural effusions requiring bilateral pigtails.  Post pull Pneumothorax noted on the right side. Right pigtail inserted again.  Hemodynamically stable.  Good oxygenation.  Fair urine out put.  Renal function is improving.        My plan includes :  Anti seizure Rx.  Statin and Betablocker.  Close hemodynamic, ventilatory and drain monitoring and management  Monitor for arrhythmias and monitor parameters for organ perfusion  Monitor neurologic status  Monitor renal function.  Head of the bed should remain elevated to 45 deg .   Chest PT and IS will be encouraged  Monitor adequacy of oxygenation and ventilation and attempt to wean oxygen  Nutritional goals will be met using po eventually , ensure adequate caloric intake and monitor the same  Stress ulcer and VTE prophylaxis will be achieved    Glycemic control is satisfactory  Electrolytes have been repleted as necessary and wound care has been carried out. Pain control has been achieved.   Aggressive physical therapy and early mobility and ambulation goals will be met   The family was updated about the course and plan  CRITICAL CARE TIME SPENT in evaluation and management, reassessments, review and interpretation of labs and x-rays, ventilator and hemodynamic management, formulating a plan and coordinating care: ___30____ MIN.  Time does not include procedural time.  CTICU ATTENDING     					    José Miguel Torres MD

## 2023-04-08 LAB
ALBUMIN SERPL ELPH-MCNC: 2.4 G/DL — LOW (ref 3.3–5)
ALP SERPL-CCNC: 79 U/L — SIGNIFICANT CHANGE UP (ref 40–120)
ALT FLD-CCNC: <5 U/L — LOW (ref 10–45)
ANION GAP SERPL CALC-SCNC: 7 MMOL/L — SIGNIFICANT CHANGE UP (ref 5–17)
ANION GAP SERPL CALC-SCNC: 8 MMOL/L — SIGNIFICANT CHANGE UP (ref 5–17)
APTT BLD: 26.1 SEC — LOW (ref 27.5–35.5)
APTT BLD: 33.7 SEC — SIGNIFICANT CHANGE UP (ref 27.5–35.5)
AST SERPL-CCNC: 17 U/L — SIGNIFICANT CHANGE UP (ref 10–40)
BILIRUB SERPL-MCNC: 0.4 MG/DL — SIGNIFICANT CHANGE UP (ref 0.2–1.2)
BUN SERPL-MCNC: 35 MG/DL — HIGH (ref 7–23)
BUN SERPL-MCNC: 38 MG/DL — HIGH (ref 7–23)
CALCIUM SERPL-MCNC: 8.6 MG/DL — SIGNIFICANT CHANGE UP (ref 8.4–10.5)
CALCIUM SERPL-MCNC: 8.8 MG/DL — SIGNIFICANT CHANGE UP (ref 8.4–10.5)
CHLORIDE SERPL-SCNC: 103 MMOL/L — SIGNIFICANT CHANGE UP (ref 96–108)
CHLORIDE SERPL-SCNC: 99 MMOL/L — SIGNIFICANT CHANGE UP (ref 96–108)
CO2 SERPL-SCNC: 27 MMOL/L — SIGNIFICANT CHANGE UP (ref 22–31)
CO2 SERPL-SCNC: 28 MMOL/L — SIGNIFICANT CHANGE UP (ref 22–31)
CREAT SERPL-MCNC: 2.45 MG/DL — HIGH (ref 0.5–1.3)
CREAT SERPL-MCNC: 2.58 MG/DL — HIGH (ref 0.5–1.3)
EGFR: 29 ML/MIN/1.73M2 — LOW
EGFR: 31 ML/MIN/1.73M2 — LOW
GLUCOSE BLDC GLUCOMTR-MCNC: 142 MG/DL — HIGH (ref 70–99)
GLUCOSE BLDC GLUCOMTR-MCNC: 79 MG/DL — SIGNIFICANT CHANGE UP (ref 70–99)
GLUCOSE BLDC GLUCOMTR-MCNC: 83 MG/DL — SIGNIFICANT CHANGE UP (ref 70–99)
GLUCOSE BLDC GLUCOMTR-MCNC: 89 MG/DL — SIGNIFICANT CHANGE UP (ref 70–99)
GLUCOSE SERPL-MCNC: 103 MG/DL — HIGH (ref 70–99)
GLUCOSE SERPL-MCNC: 79 MG/DL — SIGNIFICANT CHANGE UP (ref 70–99)
HCT VFR BLD CALC: 27.1 % — LOW (ref 39–50)
HCT VFR BLD CALC: 27.7 % — LOW (ref 39–50)
HGB BLD-MCNC: 8.9 G/DL — LOW (ref 13–17)
HGB BLD-MCNC: 9.1 G/DL — LOW (ref 13–17)
INR BLD: 1.21 — HIGH (ref 0.88–1.16)
LACTATE SERPL-SCNC: 0.7 MMOL/L — SIGNIFICANT CHANGE UP (ref 0.5–2)
MAGNESIUM SERPL-MCNC: 1.9 MG/DL — SIGNIFICANT CHANGE UP (ref 1.6–2.6)
MCHC RBC-ENTMCNC: 30.8 PG — SIGNIFICANT CHANGE UP (ref 27–34)
MCHC RBC-ENTMCNC: 31 PG — SIGNIFICANT CHANGE UP (ref 27–34)
MCHC RBC-ENTMCNC: 32.8 GM/DL — SIGNIFICANT CHANGE UP (ref 32–36)
MCHC RBC-ENTMCNC: 32.9 GM/DL — SIGNIFICANT CHANGE UP (ref 32–36)
MCV RBC AUTO: 93.8 FL — SIGNIFICANT CHANGE UP (ref 80–100)
MCV RBC AUTO: 94.2 FL — SIGNIFICANT CHANGE UP (ref 80–100)
NRBC # BLD: 0 /100 WBCS — SIGNIFICANT CHANGE UP (ref 0–0)
NRBC # BLD: 0 /100 WBCS — SIGNIFICANT CHANGE UP (ref 0–0)
PHOSPHATE SERPL-MCNC: 4 MG/DL — SIGNIFICANT CHANGE UP (ref 2.5–4.5)
PLATELET # BLD AUTO: 249 K/UL — SIGNIFICANT CHANGE UP (ref 150–400)
PLATELET # BLD AUTO: 265 K/UL — SIGNIFICANT CHANGE UP (ref 150–400)
POTASSIUM SERPL-MCNC: 4.1 MMOL/L — SIGNIFICANT CHANGE UP (ref 3.5–5.3)
POTASSIUM SERPL-MCNC: 4.1 MMOL/L — SIGNIFICANT CHANGE UP (ref 3.5–5.3)
POTASSIUM SERPL-SCNC: 4.1 MMOL/L — SIGNIFICANT CHANGE UP (ref 3.5–5.3)
POTASSIUM SERPL-SCNC: 4.1 MMOL/L — SIGNIFICANT CHANGE UP (ref 3.5–5.3)
PROT SERPL-MCNC: 5.6 G/DL — LOW (ref 6–8.3)
PROTHROM AB SERPL-ACNC: 14.4 SEC — HIGH (ref 10.5–13.4)
RBC # BLD: 2.89 M/UL — LOW (ref 4.2–5.8)
RBC # BLD: 2.94 M/UL — LOW (ref 4.2–5.8)
RBC # FLD: 14.6 % — HIGH (ref 10.3–14.5)
RBC # FLD: 14.6 % — HIGH (ref 10.3–14.5)
SODIUM SERPL-SCNC: 135 MMOL/L — SIGNIFICANT CHANGE UP (ref 135–145)
SODIUM SERPL-SCNC: 137 MMOL/L — SIGNIFICANT CHANGE UP (ref 135–145)
WBC # BLD: 18.17 K/UL — HIGH (ref 3.8–10.5)
WBC # BLD: 19.01 K/UL — HIGH (ref 3.8–10.5)
WBC # FLD AUTO: 18.17 K/UL — HIGH (ref 3.8–10.5)
WBC # FLD AUTO: 19.01 K/UL — HIGH (ref 3.8–10.5)

## 2023-04-08 RX ORDER — MAGNESIUM OXIDE 400 MG ORAL TABLET 241.3 MG
400 TABLET ORAL EVERY 12 HOURS
Refills: 0 | Status: COMPLETED | OUTPATIENT
Start: 2023-04-08 | End: 2023-04-09

## 2023-04-08 RX ORDER — POTASSIUM CHLORIDE 20 MEQ
20 PACKET (EA) ORAL ONCE
Refills: 0 | Status: DISCONTINUED | OUTPATIENT
Start: 2023-04-08 | End: 2023-04-10

## 2023-04-08 RX ORDER — BUMETANIDE 0.25 MG/ML
2 INJECTION INTRAMUSCULAR; INTRAVENOUS
Refills: 0 | Status: DISCONTINUED | OUTPATIENT
Start: 2023-04-08 | End: 2023-04-08

## 2023-04-08 RX ORDER — CEFAZOLIN SODIUM 1 G
1000 VIAL (EA) INJECTION EVERY 12 HOURS
Refills: 0 | Status: COMPLETED | OUTPATIENT
Start: 2023-04-08 | End: 2023-04-11

## 2023-04-08 RX ADMIN — LIDOCAINE 1 PATCH: 4 CREAM TOPICAL at 14:58

## 2023-04-08 RX ADMIN — BUMETANIDE 2 MILLIGRAM(S): 0.25 INJECTION INTRAMUSCULAR; INTRAVENOUS at 05:28

## 2023-04-08 RX ADMIN — CHLORHEXIDINE GLUCONATE 1 APPLICATION(S): 213 SOLUTION TOPICAL at 05:28

## 2023-04-08 RX ADMIN — OXYCODONE HYDROCHLORIDE 5 MILLIGRAM(S): 5 TABLET ORAL at 16:13

## 2023-04-08 RX ADMIN — HEPARIN SODIUM 5000 UNIT(S): 5000 INJECTION INTRAVENOUS; SUBCUTANEOUS at 05:28

## 2023-04-08 RX ADMIN — OXYCODONE HYDROCHLORIDE 5 MILLIGRAM(S): 5 TABLET ORAL at 17:04

## 2023-04-08 RX ADMIN — LIDOCAINE 1 PATCH: 4 CREAM TOPICAL at 19:32

## 2023-04-08 RX ADMIN — Medication 100 MILLIGRAM(S): at 17:13

## 2023-04-08 RX ADMIN — Medication 25 MILLIGRAM(S): at 23:10

## 2023-04-08 RX ADMIN — HEPARIN SODIUM 5000 UNIT(S): 5000 INJECTION INTRAVENOUS; SUBCUTANEOUS at 14:58

## 2023-04-08 RX ADMIN — OXYCODONE HYDROCHLORIDE 5 MILLIGRAM(S): 5 TABLET ORAL at 05:45

## 2023-04-08 RX ADMIN — OXYCODONE HYDROCHLORIDE 5 MILLIGRAM(S): 5 TABLET ORAL at 21:35

## 2023-04-08 RX ADMIN — Medication 81 MILLIGRAM(S): at 11:38

## 2023-04-08 RX ADMIN — Medication 1 APPLICATION(S): at 11:39

## 2023-04-08 RX ADMIN — Medication 650 MILLIGRAM(S): at 17:55

## 2023-04-08 RX ADMIN — LACOSAMIDE 100 MILLIGRAM(S): 50 TABLET ORAL at 05:27

## 2023-04-08 RX ADMIN — Medication 15 MILLILITER(S): at 11:49

## 2023-04-08 RX ADMIN — OXYCODONE HYDROCHLORIDE 5 MILLIGRAM(S): 5 TABLET ORAL at 22:28

## 2023-04-08 RX ADMIN — OXYCODONE HYDROCHLORIDE 5 MILLIGRAM(S): 5 TABLET ORAL at 00:13

## 2023-04-08 RX ADMIN — OXYCODONE HYDROCHLORIDE 5 MILLIGRAM(S): 5 TABLET ORAL at 03:12

## 2023-04-08 RX ADMIN — OXYCODONE HYDROCHLORIDE 5 MILLIGRAM(S): 5 TABLET ORAL at 12:27

## 2023-04-08 RX ADMIN — Medication 5 MILLIGRAM(S): at 21:35

## 2023-04-08 RX ADMIN — DIVALPROEX SODIUM 750 MILLIGRAM(S): 500 TABLET, DELAYED RELEASE ORAL at 17:12

## 2023-04-08 RX ADMIN — MAGNESIUM OXIDE 400 MG ORAL TABLET 400 MILLIGRAM(S): 241.3 TABLET ORAL at 17:12

## 2023-04-08 RX ADMIN — Medication 25 MILLIGRAM(S): at 00:08

## 2023-04-08 RX ADMIN — Medication 100 MILLIGRAM(S): at 05:28

## 2023-04-08 RX ADMIN — Medication 25 MILLIGRAM(S): at 17:12

## 2023-04-08 RX ADMIN — DIVALPROEX SODIUM 750 MILLIGRAM(S): 500 TABLET, DELAYED RELEASE ORAL at 07:06

## 2023-04-08 RX ADMIN — Medication 650 MILLIGRAM(S): at 17:12

## 2023-04-08 RX ADMIN — Medication 25 MILLIGRAM(S): at 05:27

## 2023-04-08 RX ADMIN — OXYCODONE HYDROCHLORIDE 5 MILLIGRAM(S): 5 TABLET ORAL at 05:27

## 2023-04-08 RX ADMIN — LACOSAMIDE 100 MILLIGRAM(S): 50 TABLET ORAL at 17:12

## 2023-04-08 RX ADMIN — Medication 25 MILLIGRAM(S): at 11:39

## 2023-04-08 RX ADMIN — HEPARIN SODIUM 5000 UNIT(S): 5000 INJECTION INTRAVENOUS; SUBCUTANEOUS at 21:35

## 2023-04-08 RX ADMIN — OXYCODONE HYDROCHLORIDE 5 MILLIGRAM(S): 5 TABLET ORAL at 11:38

## 2023-04-08 RX ADMIN — PANTOPRAZOLE SODIUM 40 MILLIGRAM(S): 20 TABLET, DELAYED RELEASE ORAL at 06:27

## 2023-04-08 NOTE — PROGRESS NOTE ADULT - SUBJECTIVE AND OBJECTIVE BOX
NEPHROLOGY PROGRESS NOTE    Subjective: Patient seen and examined at bedside. Urinating well, no CP or palpitations. Not walking yet but encouraged to try again today    [OBJECTIVE]:    Vital Signs:  T(F): , Max: 101 (04-07-23 @ 21:00)  HR:  (84 - 105)  BP:  (105/69 - 171/89)  BP(mean):  (82 - 118)  ABP: --  ABP(mean): --  RR:  (12 - 26)  SpO2:  (95% - 100%)  CVP(mm Hg): --  CVP(cm H2O): --      04-07 @ 07:01  -  04-08 @ 07:00  --------------------------------------------------------  IN: 730 mL / OUT: 2150 mL / NET: -1420 mL    04-08 @ 07:01  -  04-08 @ 14:04  --------------------------------------------------------  IN: 10 mL / OUT: 250 mL / NET: -240 mL      CAPILLARY BLOOD GLUCOSE      POCT Blood Glucose.: 83 mg/dL (08 Apr 2023 11:43)      .  VITAL SIGNS:  T(F): 98.1 (04-08-23 @ 08:59), Max: 101 (04-07-23 @ 21:00)  HR: 88 (04-08-23 @ 13:00) (84 - 105)  BP: 129/97 (04-08-23 @ 12:00) (105/69 - 171/89)  BP(mean): 110 (04-08-23 @ 12:00) (82 - 118)  RR: 12 (04-08-23 @ 13:00) (12 - 26)  SpO2: 99% (04-08-23 @ 13:00) (95% - 100%)    PHYSICAL EXAM:  Constitutional: Resting comfortably in bed; NAD  HEENT:  EOMI, anicteric sclera; MMM  Respiratory: CTA B/L; no W/R/R, no accessory muscle use  Cardiac: +S1/S2; RRR; no M/R/G  Gastrointestinal: soft, NT/ND; no rebound or guarding; +BS  Extremities: WWP, no clubbing or cyanosis; no peripheral edema  Dermatologic: skin warm, dry and intact; no rashes, wounds, or scars  Access: None, removed    Medications:  MEDICATIONS  (STANDING):  aspirin  chewable 81 milliGRAM(s) Oral daily  ceFAZolin   IVPB 1000 milliGRAM(s) IV Intermittent every 12 hours  chlorhexidine 2% Cloths 1 Application(s) Topical daily  collagenase Ointment 1 Application(s) Topical daily  dextrose 5%. 1000 milliLiter(s) (50 mL/Hr) IV Continuous <Continuous>  dextrose 5%. 1000 milliLiter(s) (100 mL/Hr) IV Continuous <Continuous>  dextrose 50% Injectable 50 milliLiter(s) IV Push every 15 minutes  dextrose 50% Injectable 25 milliLiter(s) IV Push every 15 minutes  divalproex  milliGRAM(s) Oral <User Schedule>  glucagon  Injectable 1 milliGRAM(s) IntraMuscular once  heparin   Injectable 5000 Unit(s) SubCutaneous every 8 hours  insulin lispro (ADMELOG) corrective regimen sliding scale   SubCutaneous Before meals and at bedtime  lacosamide 100 milliGRAM(s) Oral two times a day  lidocaine   4% Patch 1 Patch Transdermal every 24 hours  magnesium oxide 400 milliGRAM(s) Oral every 12 hours  melatonin 5 milliGRAM(s) Oral at bedtime  metoprolol tartrate 25 milliGRAM(s) Oral every 6 hours  multivitamin/minerals/iron Oral Solution (CENTRUM) 15 milliLiter(s) Oral daily  pantoprazole    Tablet 40 milliGRAM(s) Oral before breakfast  potassium chloride    Tablet ER 20 milliEquivalent(s) Oral once  sodium chloride 0.9%. 1000 milliLiter(s) (10 mL/Hr) IV Continuous <Continuous>    MEDICATIONS  (PRN):  acetaminophen     Tablet .. 650 milliGRAM(s) Oral every 6 hours PRN Mild Pain (1 - 3)  dextrose Oral Gel 15 Gram(s) Oral once PRN Blood Glucose LESS THAN 70 milliGRAM(s)/deciliter  oxyCODONE    IR 5 milliGRAM(s) Oral every 4 hours PRN Moderate Pain (4 - 6)      Allergies:  Allergies    No Known Allergies    Intolerances        Labs:                        8.9    18.17 )-----------( 265      ( 08 Apr 2023 11:16 )             27.1     04-08    135  |  99  |  38<H>  ----------------------------<  103<H>  4.1   |  28  |  2.45<H>    Ca    8.8      08 Apr 2023 11:16  Phos  4.0     04-08  Mg     1.9     04-08    TPro  5.6<L>  /  Alb  2.4<L>  /  TBili  0.4  /  DBili  x   /  AST  17  /  ALT  <5<L>  /  AlkPhos  79  04-08    PT/INR - ( 08 Apr 2023 11:16 )   PT: 14.4 sec;   INR: 1.21          PTT - ( 08 Apr 2023 11:16 )  PTT:26.1 sec      Radiology and other tests:  Creatinine, Serum: 2.45 mg/dL (04-08-23 @ 11:16)  Creatinine, Serum: 2.58 mg/dL (04-08-23 @ 04:01)  Creatinine, Serum: 3.10 mg/dL (04-07-23 @ 03:25)  Creatinine, Serum: 3.01 mg/dL (04-06-23 @ 04:08)  Creatinine, Serum: 3.19 mg/dL (04-05-23 @ 14:26)  Creatinine, Serum: 3.39 mg/dL (04-05-23 @ 00:50)  Creatinine, Serum: 3.73 mg/dL (04-04-23 @ 01:35)  Creatinine, Serum: 3.57 mg/dL (04-03-23 @ 14:47)  Creatinine, Serum: 3.45 mg/dL (04-03-23 @ 02:17)  Creatinine, Serum: 2.95 mg/dL (04-02-23 @ 13:31)

## 2023-04-08 NOTE — CHART NOTE - NSCHARTNOTEFT_GEN_A_CORE
Patient seen and examined at bedside.  Case discussed with Dr. Thomas. No air leak appreciated. R pigtail clamped for 8 hours with serial xrays.  Per Dr. Thomas's request, pigtail removed without incident.  Occlusive DSD placed.  Patient tolerated procedure well.  Chest Xray pending.

## 2023-04-08 NOTE — PROGRESS NOTE ADULT - SUBJECTIVE AND OBJECTIVE BOX
INTERVAL COURSE  No issues overnight     VITALS  Vital Signs Last 24 Hrs  T(C): 36.7 (08 Apr 2023 08:59), Max: 38.3 (07 Apr 2023 21:00)  T(F): 98.1 (08 Apr 2023 08:59), Max: 101 (07 Apr 2023 21:00)  HR: 98 (08 Apr 2023 10:00) (84 - 110)  BP: 116/76 (08 Apr 2023 10:00) (105/69 - 171/89)  BP(mean): 91 (08 Apr 2023 10:00) (82 - 118)  RR: 15 (08 Apr 2023 10:00) (14 - 27)  SpO2: 97% (08 Apr 2023 10:00) (95% - 100%)    Parameters below as of 08 Apr 2023 10:00  Patient On (Oxygen Delivery Method): room air    I&O's Summary    07 Apr 2023 07:01  -  08 Apr 2023 07:00  --------------------------------------------------------  IN: 730 mL / OUT: 2150 mL / NET: -1420 mL    08 Apr 2023 07:01  -  08 Apr 2023 10:22  --------------------------------------------------------  IN: 10 mL / OUT: 0 mL / NET: 10 mL      PHYSICAL EXAM  General: A&Ox 3; NAD  Respiratory: CTAB/L  Cardiovascular: Sinus tachycardia   Gastrointestinal: Soft; NTND   Extremities: Warm, LE anasarca   harvest site with improving erythema   Neurological:  CNII-XII grossly intact; no obvious focal deficits    MEDICATIONS  (STANDING):  aspirin  chewable 81 milliGRAM(s) Oral daily  buMETAnide Injectable 2 milliGRAM(s) IV Push daily  ceFAZolin   IVPB 1000 milliGRAM(s) IV Intermittent every 12 hours  chlorhexidine 2% Cloths 1 Application(s) Topical daily  collagenase Ointment 1 Application(s) Topical daily  dextrose 5%. 1000 milliLiter(s) (50 mL/Hr) IV Continuous <Continuous>  dextrose 5%. 1000 milliLiter(s) (100 mL/Hr) IV Continuous <Continuous>  dextrose 50% Injectable 50 milliLiter(s) IV Push every 15 minutes  dextrose 50% Injectable 25 milliLiter(s) IV Push every 15 minutes  divalproex  milliGRAM(s) Oral <User Schedule>  glucagon  Injectable 1 milliGRAM(s) IntraMuscular once  heparin   Injectable 5000 Unit(s) SubCutaneous every 8 hours  insulin lispro (ADMELOG) corrective regimen sliding scale   SubCutaneous Before meals and at bedtime  lacosamide 100 milliGRAM(s) Oral two times a day  lidocaine   4% Patch 1 Patch Transdermal every 24 hours  melatonin 5 milliGRAM(s) Oral at bedtime  metoprolol tartrate 25 milliGRAM(s) Oral every 6 hours  multivitamin/minerals/iron Oral Solution (CENTRUM) 15 milliLiter(s) Oral daily  pantoprazole    Tablet 40 milliGRAM(s) Oral before breakfast  sodium chloride 0.9%. 1000 milliLiter(s) (10 mL/Hr) IV Continuous <Continuous>    MEDICATIONS  (PRN):  acetaminophen     Tablet .. 650 milliGRAM(s) Oral every 6 hours PRN Mild Pain (1 - 3)  dextrose Oral Gel 15 Gram(s) Oral once PRN Blood Glucose LESS THAN 70 milliGRAM(s)/deciliter  oxyCODONE    IR 5 milliGRAM(s) Oral every 4 hours PRN Moderate Pain (4 - 6)      LABS                        9.1    19.01 )-----------( 249      ( 08 Apr 2023 04:01 )             27.7     04-08    137  |  103  |  35<H>  ----------------------------<  79  4.1   |  27  |  2.58<H>    Ca    8.6      08 Apr 2023 04:01  Phos  4.0     04-08  Mg     1.9     04-08    TPro  5.6<L>  /  Alb  2.4<L>  /  TBili  0.4  /  DBili  x   /  AST  17  /  ALT  <5<L>  /  AlkPhos  79  04-08    LIVER FUNCTIONS - ( 08 Apr 2023 04:01 )  Alb: 2.4 g/dL / Pro: 5.6 g/dL / ALK PHOS: 79 U/L / ALT: <5 U/L / AST: 17 U/L / GGT: x           PT/INR - ( 07 Apr 2023 03:25 )   PT: 14.1 sec;   INR: 1.18          PTT - ( 08 Apr 2023 04:01 )  PTT:33.7 sec    IMAGING/EKG/ETC  Reviewed. right ptx improved    INTERVAL COURSE  No issues overnight     VITALS  Vital Signs Last 24 Hrs  T(C): 36.7 (08 Apr 2023 08:59), Max: 38.3 (07 Apr 2023 21:00)  T(F): 98.1 (08 Apr 2023 08:59), Max: 101 (07 Apr 2023 21:00)  HR: 98 (08 Apr 2023 10:00) (84 - 110)  BP: 116/76 (08 Apr 2023 10:00) (105/69 - 171/89)  BP(mean): 91 (08 Apr 2023 10:00) (82 - 118)  RR: 15 (08 Apr 2023 10:00) (14 - 27)  SpO2: 97% (08 Apr 2023 10:00) (95% - 100%)    Parameters below as of 08 Apr 2023 10:00  Patient On (Oxygen Delivery Method): room air    I&O's Summary    07 Apr 2023 07:01  -  08 Apr 2023 07:00  --------------------------------------------------------  IN: 730 mL / OUT: 2150 mL / NET: -1420 mL    08 Apr 2023 07:01  -  08 Apr 2023 10:22  --------------------------------------------------------  IN: 10 mL / OUT: 0 mL / NET: 10 mL      PHYSICAL EXAM  General: A&Ox 3; NAD  Respiratory: CTAB/L  Cardiovascular: Sinus tachycardia   Gastrointestinal: Soft; NTND   Extremities: Warm, anasarca improved except mild pedal edema   harvest site with improving erythema   Neurological:  CNII-XII grossly intact; no obvious focal deficits    MEDICATIONS  (STANDING):  aspirin  chewable 81 milliGRAM(s) Oral daily  buMETAnide Injectable 2 milliGRAM(s) IV Push daily  ceFAZolin   IVPB 1000 milliGRAM(s) IV Intermittent every 12 hours  chlorhexidine 2% Cloths 1 Application(s) Topical daily  collagenase Ointment 1 Application(s) Topical daily  dextrose 5%. 1000 milliLiter(s) (50 mL/Hr) IV Continuous <Continuous>  dextrose 5%. 1000 milliLiter(s) (100 mL/Hr) IV Continuous <Continuous>  dextrose 50% Injectable 50 milliLiter(s) IV Push every 15 minutes  dextrose 50% Injectable 25 milliLiter(s) IV Push every 15 minutes  divalproex  milliGRAM(s) Oral <User Schedule>  glucagon  Injectable 1 milliGRAM(s) IntraMuscular once  heparin   Injectable 5000 Unit(s) SubCutaneous every 8 hours  insulin lispro (ADMELOG) corrective regimen sliding scale   SubCutaneous Before meals and at bedtime  lacosamide 100 milliGRAM(s) Oral two times a day  lidocaine   4% Patch 1 Patch Transdermal every 24 hours  melatonin 5 milliGRAM(s) Oral at bedtime  metoprolol tartrate 25 milliGRAM(s) Oral every 6 hours  multivitamin/minerals/iron Oral Solution (CENTRUM) 15 milliLiter(s) Oral daily  pantoprazole    Tablet 40 milliGRAM(s) Oral before breakfast  sodium chloride 0.9%. 1000 milliLiter(s) (10 mL/Hr) IV Continuous <Continuous>    MEDICATIONS  (PRN):  acetaminophen     Tablet .. 650 milliGRAM(s) Oral every 6 hours PRN Mild Pain (1 - 3)  dextrose Oral Gel 15 Gram(s) Oral once PRN Blood Glucose LESS THAN 70 milliGRAM(s)/deciliter  oxyCODONE    IR 5 milliGRAM(s) Oral every 4 hours PRN Moderate Pain (4 - 6)      LABS                        9.1    19.01 )-----------( 249      ( 08 Apr 2023 04:01 )             27.7     04-08    137  |  103  |  35<H>  ----------------------------<  79  4.1   |  27  |  2.58<H>    Ca    8.6      08 Apr 2023 04:01  Phos  4.0     04-08  Mg     1.9     04-08    TPro  5.6<L>  /  Alb  2.4<L>  /  TBili  0.4  /  DBili  x   /  AST  17  /  ALT  <5<L>  /  AlkPhos  79  04-08    LIVER FUNCTIONS - ( 08 Apr 2023 04:01 )  Alb: 2.4 g/dL / Pro: 5.6 g/dL / ALK PHOS: 79 U/L / ALT: <5 U/L / AST: 17 U/L / GGT: x           PT/INR - ( 07 Apr 2023 03:25 )   PT: 14.1 sec;   INR: 1.18          PTT - ( 08 Apr 2023 04:01 )  PTT:33.7 sec    IMAGING/EKG/ETC  Reviewed. right ptx improved

## 2023-04-08 NOTE — PROGRESS NOTE ADULT - ASSESSMENT
54 yo M with non-oliguric iATN i/s/o replacement of aortic arch and TAVR on 3/20- prolonged OR time and massive transfusion and other blood products. Initiated on CVVHD (3/21-3/24) for clearance and volume. Started on diuresis on 3/24 with good response but remained vol overloaded, so started iHD 3/27 for volume and intermittent AQ on 3/31 now back on diuretic challenge    Assessment/Plan:   #Non-oliguric iATN due to intra-op hemodynamic changes, dialysis dependent  BCr 1.2, eGFR 67 (3/9)  UA (3/25) w/ proteinuria and mod blood w/o RBCs.  Showing some ATN recovery, U/O improving  CVVHD (3/21- 3/24)  AQ (3/31-4/2)    Plan:  Continue with bumex 2mg daily  sCr trending down, cautiously optimistic can remain off HD  strict I&Os  BMP per icu protocol  Maintain MAP >70 for renal perfusion     #Anemia  -Check iron profile and ferritin    Discussed w/ CTICU team

## 2023-04-08 NOTE — PROGRESS NOTE ADULT - ASSESSMENT
52 year old male, current every day marijuana use w/pmh of HTN, type A aortic dissection s/p Dacron grafts, AV resuspension in 2013,CAD s/p CABG x 1 SVG to RCA (5/2013 with Dr. Medina), seizure disorder (last episode on 7/4/22) presents for a follow up visit for evaluation and management of his aortic pathology and deemed a candidate for a reop total arch replacement given aortic aneurysm and chronic dissection.  S/p AVR/ Arch replacement/ aorto-axillary bypass- CABG x 1 and TEVAR   3/20  Postop course with worsening renal failure and sig acidosis started on CVVHD  Afib 3/23  Extubated 03/29  completed 7 day course of zosyn for serratia VAP last day 4/2   A/p   - MS and neuro exam intact- continue AEDs  - postop afib converted to sinus- IV and PO amio loaded not on maintenance amio- continue lopressor 25 mg q8  Titrating according to MAPs > 70 for adequate renal perfusion   - Renal failure-iATN given long operation and clamp time/ required CVVHD for acidosis though non oliguric renal injury-->off dialysis   HD cath removed 4/2- improving renal fx with normal lytes and bicarb, responding well to diuretics maintaining Neg FB for volume mgmt   To replete K> 4 and Mg> 2  H&H and plt in good range  Improving leukocytosis slowly- Continue Ancef for harvest site cellulitis/ 7 day course last dose 4/10   Continue ICU prophylactic measures  Optimize nutrition, ambulation and daily PT- PTX improved pigtail with no air leak   Clamp trial and dc later today     ATTENDING: I have personally and independently provided 30 Min of critical care services.  52 year old male, current every day marijuana use w/pmh of HTN, type A aortic dissection s/p Dacron grafts, AV resuspension in 2013,CAD s/p CABG x 1 SVG to RCA (5/2013 with Dr. Medina), seizure disorder (last episode on 7/4/22) presents for a follow up visit for evaluation and management of his aortic pathology and deemed a candidate for a reop total arch replacement given aortic aneurysm and chronic dissection.  S/p AVR/ Arch replacement/ aorto-axillary bypass- CABG x 1 and TEVAR   3/20  Postop course with worsening renal failure and sig acidosis started on CVVHD  Afib 3/23  Extubated 03/29  completed 7 day course of zosyn for serratia VAP last day 4/2   A/p   - MS and neuro exam intact- continue AEDs  - Postop afib converted to sinus- IV and PO amio loaded not on maintenance amio- continue lopressor 25 mg q8  Titrating according to MAPs > 70 for adequate renal perfusion   - Renal failure-iATN given long operation and clamp time/ required CVVHD for acidosis though non oliguric renal injury-->off dialysis   HD cath removed 4/2- improving renal fx with normal lytes and bicarb, responding well to diuretics maintaining Neg FB, anasarca improved   will hold off on bumex   To replete K> 4 and Mg> 2  H&H and plt in good range  Improving leukocytosis slowly- Continue Ancef for harvest site cellulitis/ 7 day course last dose 4/10   Continue ICU prophylactic measures  Optimize nutrition, ambulation and daily PT- PTX improved pigtail with no air leak   Clamp trial and dc later today     ATTENDING: I have personally and independently provided 30 Min of critical care services.

## 2023-04-08 NOTE — CHART NOTE - NSCHARTNOTESELECT_GEN_ALL_CORE
Event Note
Follow Up/Nutrition Services
Nutrition Follow Up/Nutrition Services
PW removal/Event Note
Pigtail removal/Event Note
Right anterior Pigtail removal/Event Note
CT removal/Event Note
Event Note
Event Note
Groin line removal/Event Note
Nutrition Services
Nutrition Services

## 2023-04-08 NOTE — PROGRESS NOTE ADULT - SUBJECTIVE AND OBJECTIVE BOX
CTICU  CRITICAL  CARE  attending     Hand off received 					   Pertinent clinical, laboratory, radiographic, hemodynamic, echocardiographic, respiratory data, microbiologic data and chart were reviewed and analyzed frequently throughout the course of the day and night        52 year old male with HTN, type A aortic dissection s/p Dacron grafts, AV resuspension in 2013,CAD s/p CABG x 1 SVG to RCA (5/2013 with Dr. Medina), seizure disorder (last episode on 7/4/22).  He is a current every day marijuana user  CTA chest, abdomen and pelvis 11/30/22: Status post graft repair of the ascending aorta and portion of aortic arch.  Dissecting aneurysm of the descending thoracic aorta (measuring up to 5.7 cm) and abdominal aorta (3.5 cm infrarenal), similar to the MR angiogram of 9/19/2022.  Nonocclusive dissection flap present within the innominate artery, aneurysmal right subclavian artery and proximal right common carotid artery as well as aneurysmal left common iliac artery.    He was referred by Dr. Jacqueline Gonsales.  He ws screened for [facility]omphe study but did not qualify.    S/P AVR  Aortic arch replacement  S/P CABG (SVG to RCA).  S/P Aorto left axillary by pass.  S/P TEVAR to descending aorta.        FAMILY HISTORY:  No pertinent family history in first degree relatives    PAST MEDICAL & SURGICAL HISTORY:  HTN (hypertension)  Aortic dissection  CAD (coronary artery disease)  Seizure disorder  S/P aortic bifurcation bypass graft  S/P CABG x 1            14 system review was unremarkable    Vital signs, hemodynamic and respiratory parameters were reviewed from the bedside nursing flow sheet.  ICU Vital Signs Last 24 Hrs  T(C): 37.2 (08 Apr 2023 17:39), Max: 38.3 (07 Apr 2023 21:00)  T(F): 99 (08 Apr 2023 17:39), Max: 101 (07 Apr 2023 21:00)  HR: 86 (08 Apr 2023 19:00) (84 - 105)  BP: 110/77 (08 Apr 2023 19:00) (105/69 - 148/84)  BP(mean): 90 (08 Apr 2023 19:00) (82 - 112)  ABP: --  ABP(mean): --  RR: 18 (08 Apr 2023 19:00) (12 - 19)  SpO2: 99% (08 Apr 2023 19:00) (95% - 100%)    O2 Parameters below as of 08 Apr 2023 19:00  Patient On (Oxygen Delivery Method): room air          Adult Advanced Hemodynamics Last 24 Hrs  CVP(mm Hg): --  CVP(cm H2O): --  CO: --  CI: --  PA: --  PA(mean): --  PCWP: --  SVR: --  SVRI: --  PVR: --  PVRI: --,     Intake and output was reviewed and the fluid balance was calculated  Daily     Daily   I&O's Summary    07 Apr 2023 07:01  -  08 Apr 2023 07:00  --------------------------------------------------------  IN: 730 mL / OUT: 2150 mL / NET: -1420 mL    08 Apr 2023 07:01  -  08 Apr 2023 19:35  --------------------------------------------------------  IN: 10 mL / OUT: 600 mL / NET: -590 mL        All lines and drain sites were assessed    Neuro: No focal motor deficit.   Neck: No JVD.  CVS: S1, S2, No S3.  Lungs: Good air entry bilaterally.  Abd: Soft. No tenderness. + Bowel sounds.  Vascular: + DP/PT.  Extremities: LLE cellulitis   Lymphatic: Normal.  Skin: No abnormalities.      labs  CBC Full  -  ( 08 Apr 2023 11:16 )  WBC Count : 18.17 K/uL  RBC Count : 2.89 M/uL  Hemoglobin : 8.9 g/dL  Hematocrit : 27.1 %  Platelet Count - Automated : 265 K/uL  Mean Cell Volume : 93.8 fl  Mean Cell Hemoglobin : 30.8 pg  Mean Cell Hemoglobin Concentration : 32.8 gm/dL  Auto Neutrophil # : x  Auto Lymphocyte # : x  Auto Monocyte # : x  Auto Eosinophil # : x  Auto Basophil # : x  Auto Neutrophil % : x  Auto Lymphocyte % : x  Auto Monocyte % : x  Auto Eosinophil % : x  Auto Basophil % : x    04-08    135  |  99  |  38<H>  ----------------------------<  103<H>  4.1   |  28  |  2.45<H>    Ca    8.8      08 Apr 2023 11:16  Phos  4.0     04-08  Mg     1.9     04-08    TPro  5.6<L>  /  Alb  2.4<L>  /  TBili  0.4  /  DBili  x   /  AST  17  /  ALT  <5<L>  /  AlkPhos  79  04-08    PT/INR - ( 08 Apr 2023 11:16 )   PT: 14.4 sec;   INR: 1.21          PTT - ( 08 Apr 2023 11:16 )  PTT:26.1 sec  The current medications were reviewed   MEDICATIONS  (STANDING):  aspirin  chewable 81 milliGRAM(s) Oral daily  ceFAZolin   IVPB 1000 milliGRAM(s) IV Intermittent every 12 hours  chlorhexidine 2% Cloths 1 Application(s) Topical daily  collagenase Ointment 1 Application(s) Topical daily  dextrose 5%. 1000 milliLiter(s) (100 mL/Hr) IV Continuous <Continuous>  dextrose 5%. 1000 milliLiter(s) (50 mL/Hr) IV Continuous <Continuous>  dextrose 50% Injectable 50 milliLiter(s) IV Push every 15 minutes  dextrose 50% Injectable 25 milliLiter(s) IV Push every 15 minutes  divalproex  milliGRAM(s) Oral <User Schedule>  glucagon  Injectable 1 milliGRAM(s) IntraMuscular once  heparin   Injectable 5000 Unit(s) SubCutaneous every 8 hours  insulin lispro (ADMELOG) corrective regimen sliding scale   SubCutaneous Before meals and at bedtime  lacosamide 100 milliGRAM(s) Oral two times a day  lidocaine   4% Patch 1 Patch Transdermal every 24 hours  magnesium oxide 400 milliGRAM(s) Oral every 12 hours  melatonin 5 milliGRAM(s) Oral at bedtime  metoprolol tartrate 25 milliGRAM(s) Oral every 6 hours  multivitamin/minerals/iron Oral Solution (CENTRUM) 15 milliLiter(s) Oral daily  pantoprazole    Tablet 40 milliGRAM(s) Oral before breakfast  potassium chloride    Tablet ER 20 milliEquivalent(s) Oral once  sodium chloride 0.9%. 1000 milliLiter(s) (10 mL/Hr) IV Continuous <Continuous>    MEDICATIONS  (PRN):  acetaminophen     Tablet .. 650 milliGRAM(s) Oral every 6 hours PRN Mild Pain (1 - 3)  dextrose Oral Gel 15 Gram(s) Oral once PRN Blood Glucose LESS THAN 70 milliGRAM(s)/deciliter  oxyCODONE    IR 5 milliGRAM(s) Oral every 4 hours PRN Moderate Pain (4 - 6)          53 year old  Male with expanding aortic arch and descending aortic aneurysm   S/P AVR  Aortic arch replacement  S/P CABG (SVG to RCA).  S/P Aorto left axillary by pass.  S/P TEVAR to descending aorta.  Long postoperative course complicated by vent dependent respiratory failure, renal failure, thrombocytopenia, serratia pneumonia, bilateral pleural effusions requiring bilateral pigtails.  Post pull Pneumothorax noted on the right side. Right pigtail inserted again.  Hemodynamically stable.  Good oxygenation.  Fair urine out put.   Both pigtails catheters removed today.  BUN/Creatinine trending down.   Good PO intake.       My plan includes :  Anti seizure Rx..  Statin and Betablocker.  Close hemodynamic, ventilatory and drain monitoring and management  Monitor for arrhythmias and monitor parameters for organ perfusion  Monitor neurologic status  Monitor renal function.  Head of the bed should remain elevated to 45 deg .   Chest PT and IS will be encouraged  Monitor adequacy of oxygenation   Nutritional goals will be met using po eventually , ensure adequate caloric intake and monitor the same  Stress ulcer and VTE prophylaxis will be achieved    Glycemic control is satisfactory  Electrolytes have been repleted as necessary and wound care has been carried out. Pain control has been achieved.   Aggressive physical therapy and early mobility and ambulation goals will be met   The family was updated about the course and plan  CRITICAL CARE TIME SPENT in evaluation and management, reassessments, interpretation of labs and x-rays,  hemodynamic management, formulating a plan and coordinating care: ___30____ MIN.  Time does not include procedural time.  CTICU ATTENDING     					    José Miguel Torres MD

## 2023-04-09 LAB
ALBUMIN SERPL ELPH-MCNC: 2.3 G/DL — LOW (ref 3.3–5)
ALP SERPL-CCNC: 86 U/L — SIGNIFICANT CHANGE UP (ref 40–120)
ALT FLD-CCNC: <5 U/L — LOW (ref 10–45)
ANION GAP SERPL CALC-SCNC: 9 MMOL/L — SIGNIFICANT CHANGE UP (ref 5–17)
APTT BLD: 31 SEC — SIGNIFICANT CHANGE UP (ref 27.5–35.5)
AST SERPL-CCNC: 17 U/L — SIGNIFICANT CHANGE UP (ref 10–40)
BILIRUB SERPL-MCNC: 0.4 MG/DL — SIGNIFICANT CHANGE UP (ref 0.2–1.2)
BUN SERPL-MCNC: 34 MG/DL — HIGH (ref 7–23)
CALCIUM SERPL-MCNC: 8.8 MG/DL — SIGNIFICANT CHANGE UP (ref 8.4–10.5)
CHLORIDE SERPL-SCNC: 98 MMOL/L — SIGNIFICANT CHANGE UP (ref 96–108)
CO2 SERPL-SCNC: 28 MMOL/L — SIGNIFICANT CHANGE UP (ref 22–31)
CREAT SERPL-MCNC: 2.28 MG/DL — HIGH (ref 0.5–1.3)
EGFR: 33 ML/MIN/1.73M2 — LOW
GLUCOSE SERPL-MCNC: 83 MG/DL — SIGNIFICANT CHANGE UP (ref 70–99)
HCT VFR BLD CALC: 29.8 % — LOW (ref 39–50)
HGB BLD-MCNC: 9.7 G/DL — LOW (ref 13–17)
INR BLD: 1.22 — HIGH (ref 0.88–1.16)
MAGNESIUM SERPL-MCNC: 1.8 MG/DL — SIGNIFICANT CHANGE UP (ref 1.6–2.6)
MCHC RBC-ENTMCNC: 30.6 PG — SIGNIFICANT CHANGE UP (ref 27–34)
MCHC RBC-ENTMCNC: 32.6 GM/DL — SIGNIFICANT CHANGE UP (ref 32–36)
MCV RBC AUTO: 94 FL — SIGNIFICANT CHANGE UP (ref 80–100)
NRBC # BLD: 0 /100 WBCS — SIGNIFICANT CHANGE UP (ref 0–0)
PHOSPHATE SERPL-MCNC: 3.4 MG/DL — SIGNIFICANT CHANGE UP (ref 2.5–4.5)
PLATELET # BLD AUTO: 279 K/UL — SIGNIFICANT CHANGE UP (ref 150–400)
POTASSIUM SERPL-MCNC: 4.1 MMOL/L — SIGNIFICANT CHANGE UP (ref 3.5–5.3)
POTASSIUM SERPL-SCNC: 4.1 MMOL/L — SIGNIFICANT CHANGE UP (ref 3.5–5.3)
PROT SERPL-MCNC: 6 G/DL — SIGNIFICANT CHANGE UP (ref 6–8.3)
PROTHROM AB SERPL-ACNC: 14.6 SEC — HIGH (ref 10.5–13.4)
RBC # BLD: 3.17 M/UL — LOW (ref 4.2–5.8)
RBC # FLD: 14.4 % — SIGNIFICANT CHANGE UP (ref 10.3–14.5)
SODIUM SERPL-SCNC: 135 MMOL/L — SIGNIFICANT CHANGE UP (ref 135–145)
WBC # BLD: 14.99 K/UL — HIGH (ref 3.8–10.5)
WBC # FLD AUTO: 14.99 K/UL — HIGH (ref 3.8–10.5)

## 2023-04-09 RX ORDER — METOPROLOL TARTRATE 50 MG
12.5 TABLET ORAL ONCE
Refills: 0 | Status: COMPLETED | OUTPATIENT
Start: 2023-04-09 | End: 2023-04-09

## 2023-04-09 RX ORDER — METOPROLOL TARTRATE 50 MG
37.5 TABLET ORAL EVERY 6 HOURS
Refills: 0 | Status: DISCONTINUED | OUTPATIENT
Start: 2023-04-09 | End: 2023-04-10

## 2023-04-09 RX ADMIN — HEPARIN SODIUM 5000 UNIT(S): 5000 INJECTION INTRAVENOUS; SUBCUTANEOUS at 13:12

## 2023-04-09 RX ADMIN — LIDOCAINE 1 PATCH: 4 CREAM TOPICAL at 14:36

## 2023-04-09 RX ADMIN — LIDOCAINE 1 PATCH: 4 CREAM TOPICAL at 18:53

## 2023-04-09 RX ADMIN — HEPARIN SODIUM 5000 UNIT(S): 5000 INJECTION INTRAVENOUS; SUBCUTANEOUS at 21:06

## 2023-04-09 RX ADMIN — Medication 12.5 MILLIGRAM(S): at 19:00

## 2023-04-09 RX ADMIN — LIDOCAINE 1 PATCH: 4 CREAM TOPICAL at 01:04

## 2023-04-09 RX ADMIN — Medication 81 MILLIGRAM(S): at 11:49

## 2023-04-09 RX ADMIN — DIVALPROEX SODIUM 750 MILLIGRAM(S): 500 TABLET, DELAYED RELEASE ORAL at 17:57

## 2023-04-09 RX ADMIN — OXYCODONE HYDROCHLORIDE 5 MILLIGRAM(S): 5 TABLET ORAL at 17:58

## 2023-04-09 RX ADMIN — MAGNESIUM OXIDE 400 MG ORAL TABLET 400 MILLIGRAM(S): 241.3 TABLET ORAL at 05:07

## 2023-04-09 RX ADMIN — PANTOPRAZOLE SODIUM 40 MILLIGRAM(S): 20 TABLET, DELAYED RELEASE ORAL at 06:01

## 2023-04-09 RX ADMIN — LACOSAMIDE 100 MILLIGRAM(S): 50 TABLET ORAL at 05:07

## 2023-04-09 RX ADMIN — DIVALPROEX SODIUM 750 MILLIGRAM(S): 500 TABLET, DELAYED RELEASE ORAL at 05:07

## 2023-04-09 RX ADMIN — Medication 5 MILLIGRAM(S): at 21:05

## 2023-04-09 RX ADMIN — OXYCODONE HYDROCHLORIDE 5 MILLIGRAM(S): 5 TABLET ORAL at 09:30

## 2023-04-09 RX ADMIN — Medication 37.5 MILLIGRAM(S): at 21:37

## 2023-04-09 RX ADMIN — Medication 25 MILLIGRAM(S): at 11:49

## 2023-04-09 RX ADMIN — OXYCODONE HYDROCHLORIDE 5 MILLIGRAM(S): 5 TABLET ORAL at 18:54

## 2023-04-09 RX ADMIN — OXYCODONE HYDROCHLORIDE 5 MILLIGRAM(S): 5 TABLET ORAL at 04:10

## 2023-04-09 RX ADMIN — HEPARIN SODIUM 5000 UNIT(S): 5000 INJECTION INTRAVENOUS; SUBCUTANEOUS at 05:07

## 2023-04-09 RX ADMIN — Medication 1 APPLICATION(S): at 11:50

## 2023-04-09 RX ADMIN — Medication 100 MILLIGRAM(S): at 05:06

## 2023-04-09 RX ADMIN — Medication 100 MILLIGRAM(S): at 17:58

## 2023-04-09 RX ADMIN — Medication 25 MILLIGRAM(S): at 05:06

## 2023-04-09 RX ADMIN — LACOSAMIDE 100 MILLIGRAM(S): 50 TABLET ORAL at 17:57

## 2023-04-09 RX ADMIN — CHLORHEXIDINE GLUCONATE 1 APPLICATION(S): 213 SOLUTION TOPICAL at 05:08

## 2023-04-09 RX ADMIN — OXYCODONE HYDROCHLORIDE 5 MILLIGRAM(S): 5 TABLET ORAL at 08:34

## 2023-04-09 RX ADMIN — Medication 25 MILLIGRAM(S): at 17:58

## 2023-04-09 RX ADMIN — OXYCODONE HYDROCHLORIDE 5 MILLIGRAM(S): 5 TABLET ORAL at 03:09

## 2023-04-09 RX ADMIN — Medication 15 MILLILITER(S): at 11:50

## 2023-04-09 NOTE — PROGRESS NOTE ADULT - SUBJECTIVE AND OBJECTIVE BOX
NEPHROLOGY PROGRESS NOTE    Subjective: Patient seen and examined at bedside. Discussed with patient sCr continues to improve and encouraged to continue to mobilize as best as possible, patient agreeable and pleased with improvement. No other issues identified.     [OBJECTIVE]:    Vital Signs:  T(F): , Max: 99.1 (04-08-23 @ 21:08)  HR:  (76 - 108)  BP:  (103/59 - 142/77)  BP(mean):  (73 - 109)  ABP: --  ABP(mean): --  RR:  (12 - 18)  SpO2:  (95% - 100%)  CVP(mm Hg): --  CVP(cm H2O): --      04-08 @ 07:01  -  04-09 @ 07:00  --------------------------------------------------------  IN: 305 mL / OUT: 1200 mL / NET: -895 mL    04-09 @ 07:01  -  04-09 @ 12:38  --------------------------------------------------------  IN: 260 mL / OUT: 325 mL / NET: -65 mL      CAPILLARY BLOOD GLUCOSE      POCT Blood Glucose.: 89 mg/dL (08 Apr 2023 21:34)      Physical Exam:  T(F): 98.2 (04-09-23 @ 09:07)  HR: 108 (04-09-23 @ 12:00)  BP: 131/85 (04-09-23 @ 12:00)  RR: 15 (04-09-23 @ 12:00)  SpO2: 97% (04-09-23 @ 12:00)  Wt(kg): --    PHYSICAL EXAM:  Constitutional: Resting comfortably in bed; NAD  HEENT:  EOMI, anicteric sclera; MMM  Respiratory: CTA B/L; no W/R/R, no accessory muscle use  Cardiac: +S1/S2; RRR; no M/R/G  Gastrointestinal: soft, NT/ND; no rebound or guarding; +BS  Extremities: WWP, no clubbing or cyanosis; no peripheral edema  Dermatologic: skin warm, dry and intact; no rashes, wounds, or scars  Access: None, removed    Medications:  MEDICATIONS  (STANDING):  aspirin  chewable 81 milliGRAM(s) Oral daily  ceFAZolin   IVPB 1000 milliGRAM(s) IV Intermittent every 12 hours  chlorhexidine 2% Cloths 1 Application(s) Topical daily  collagenase Ointment 1 Application(s) Topical daily  dextrose 50% Injectable 50 milliLiter(s) IV Push every 15 minutes  dextrose 50% Injectable 25 milliLiter(s) IV Push every 15 minutes  divalproex  milliGRAM(s) Oral <User Schedule>  heparin   Injectable 5000 Unit(s) SubCutaneous every 8 hours  lacosamide 100 milliGRAM(s) Oral two times a day  lidocaine   4% Patch 1 Patch Transdermal every 24 hours  melatonin 5 milliGRAM(s) Oral at bedtime  metoprolol tartrate 25 milliGRAM(s) Oral every 6 hours  multivitamin/minerals/iron Oral Solution (CENTRUM) 15 milliLiter(s) Oral daily  pantoprazole    Tablet 40 milliGRAM(s) Oral before breakfast  potassium chloride    Tablet ER 20 milliEquivalent(s) Oral once  sodium chloride 0.9%. 1000 milliLiter(s) (10 mL/Hr) IV Continuous <Continuous>    MEDICATIONS  (PRN):  acetaminophen     Tablet .. 650 milliGRAM(s) Oral every 6 hours PRN Mild Pain (1 - 3)  oxyCODONE    IR 5 milliGRAM(s) Oral every 4 hours PRN Moderate Pain (4 - 6)      Allergies:  Allergies    No Known Allergies    Intolerances        Labs:                        9.7    14.99 )-----------( 279      ( 09 Apr 2023 03:32 )             29.8     04-09    135  |  98  |  34<H>  ----------------------------<  83  4.1   |  28  |  2.28<H>    Ca    8.8      09 Apr 2023 03:32  Phos  3.4     04-09  Mg     1.8     04-09    TPro  6.0  /  Alb  2.3<L>  /  TBili  0.4  /  DBili  x   /  AST  17  /  ALT  <5<L>  /  AlkPhos  86  04-09    PT/INR - ( 09 Apr 2023 03:32 )   PT: 14.6 sec;   INR: 1.22          PTT - ( 09 Apr 2023 03:32 )  PTT:31.0 sec      Radiology and other tests:  Creatinine, Serum: 2.28 mg/dL (04-09-23 @ 03:32)  Creatinine, Serum: 2.45 mg/dL (04-08-23 @ 11:16)  Creatinine, Serum: 2.58 mg/dL (04-08-23 @ 04:01)  Creatinine, Serum: 3.10 mg/dL (04-07-23 @ 03:25)  Creatinine, Serum: 3.01 mg/dL (04-06-23 @ 04:08)  Creatinine, Serum: 3.19 mg/dL (04-05-23 @ 14:26)  Creatinine, Serum: 3.39 mg/dL (04-05-23 @ 00:50)  Creatinine, Serum: 3.73 mg/dL (04-04-23 @ 01:35)  Creatinine, Serum: 3.57 mg/dL (04-03-23 @ 14:47)  Creatinine, Serum: 3.45 mg/dL (04-03-23 @ 02:17)

## 2023-04-09 NOTE — PROGRESS NOTE ADULT - PROVIDER SPECIALTY LIST ADULT
Critical Care
Epilepsy
Nephrology
Critical Care
Epilepsy
Nephrology
Critical Care
Nephrology
Critical Care
Neurology

## 2023-04-09 NOTE — PROGRESS NOTE ADULT - SUBJECTIVE AND OBJECTIVE BOX
CTICU  CRITICAL  CARE  attending     Hand off received 					   Pertinent clinical, laboratory, radiographic, hemodynamic, echocardiographic, respiratory data, microbiologic data and chart were reviewed and analyzed frequently throughout the course of the day and night        52 year old male with HTN, type A aortic dissection s/p Dacron grafts, AV resuspension in 2013,CAD s/p CABG x 1 SVG to RCA (5/2013 with Dr. Medina), seizure disorder (last episode on 7/4/22).  He is a current every day marijuana user  CTA chest, abdomen and pelvis 11/30/22: Status post graft repair of the ascending aorta and portion of aortic arch.  Dissecting aneurysm of the descending thoracic aorta (measuring up to 5.7 cm) and abdominal aorta (3.5 cm infrarenal), similar to the MR angiogram of 9/19/2022.  Nonocclusive dissection flap present within the innominate artery, aneurysmal right subclavian artery and proximal right common carotid artery as well as aneurysmal left common iliac artery.    He was referred by Dr. Jacqueline Gonsales.  He ws screened for Triomphe study but did not qualify.    S/P AVR  Aortic arch replacement  S/P CABG (SVG to RCA).  S/P Aorto left axillary by pass.  S/P TEVAR to descending aorta.        FAMILY HISTORY:  No pertinent family history in first degree relatives    PAST MEDICAL & SURGICAL HISTORY:  HTN (hypertension)  Aortic dissection  CAD (coronary artery disease)  Seizure disorder  S/P aortic bifurcation bypass graft  S/P CABG x 1        14 system review was unremarkable    Vital signs, hemodynamic and respiratory parameters were reviewed from the bedside nursing flow sheet.  ICU Vital Signs Last 24 Hrs  T(C): 36.8 (09 Apr 2023 21:11), Max: 37.2 (09 Apr 2023 01:01)  T(F): 98.3 (09 Apr 2023 21:11), Max: 98.9 (09 Apr 2023 01:01)  HR: 85 (09 Apr 2023 21:00) (76 - 108)  BP: 158/77 (09 Apr 2023 21:00) (103/59 - 158/77)  BP(mean): 109 (09 Apr 2023 21:00) (73 - 109)  ABP: --  ABP(mean): --  RR: 18 (09 Apr 2023 21:00) (14 - 18)  SpO2: 98% (09 Apr 2023 21:00) (94% - 100%)    O2 Parameters below as of 09 Apr 2023 21:00  Patient On (Oxygen Delivery Method): room air          Adult Advanced Hemodynamics Last 24 Hrs  CVP(mm Hg): --  CVP(cm H2O): --  CO: --  CI: --  PA: --  PA(mean): --  PCWP: --  SVR: --  SVRI: --  PVR: --  PVRI: --,     Intake and output was reviewed and the fluid balance was calculated  Daily     Daily   I&O's Summary    08 Apr 2023 07:01  -  09 Apr 2023 07:00  --------------------------------------------------------  IN: 305 mL / OUT: 1200 mL / NET: -895 mL    09 Apr 2023 07:01  -  09 Apr 2023 22:06  --------------------------------------------------------  IN: 360 mL / OUT: 625 mL / NET: -265 mL        All lines and drain sites were assessed    Neuro: No change in the mental status from the baseline. Moves all 4 extremities.  Neck: No JVD.  CVS: S1, S2, No S3.  Lungs: Good air entry bilaterally.  Abd: Soft. No tenderness. + Bowel sounds.  Vascular: + DP/PT.  Extremities: Left knee ulceration with mild redness.   Lymphatic: Normal.  Skin: No abnormalities.      labs  CBC Full  -  ( 09 Apr 2023 03:32 )  WBC Count : 14.99 K/uL  RBC Count : 3.17 M/uL  Hemoglobin : 9.7 g/dL  Hematocrit : 29.8 %  Platelet Count - Automated : 279 K/uL  Mean Cell Volume : 94.0 fl  Mean Cell Hemoglobin : 30.6 pg  Mean Cell Hemoglobin Concentration : 32.6 gm/dL  Auto Neutrophil # : x  Auto Lymphocyte # : x  Auto Monocyte # : x  Auto Eosinophil # : x  Auto Basophil # : x  Auto Neutrophil % : x  Auto Lymphocyte % : x  Auto Monocyte % : x  Auto Eosinophil % : x  Auto Basophil % : x    04-09    135  |  98  |  34<H>  ----------------------------<  83  4.1   |  28  |  2.28<H>    Ca    8.8      09 Apr 2023 03:32  Phos  3.4     04-09  Mg     1.8     04-09    TPro  6.0  /  Alb  2.3<L>  /  TBili  0.4  /  DBili  x   /  AST  17  /  ALT  <5<L>  /  AlkPhos  86  04-09    PT/INR - ( 09 Apr 2023 03:32 )   PT: 14.6 sec;   INR: 1.22          PTT - ( 09 Apr 2023 03:32 )  PTT:31.0 sec  The current medications were reviewed   MEDICATIONS  (STANDING):  aspirin  chewable 81 milliGRAM(s) Oral daily  ceFAZolin   IVPB 1000 milliGRAM(s) IV Intermittent every 12 hours  chlorhexidine 2% Cloths 1 Application(s) Topical daily  collagenase Ointment 1 Application(s) Topical daily  dextrose 50% Injectable 50 milliLiter(s) IV Push every 15 minutes  dextrose 50% Injectable 25 milliLiter(s) IV Push every 15 minutes  divalproex  milliGRAM(s) Oral <User Schedule>  heparin   Injectable 5000 Unit(s) SubCutaneous every 8 hours  lacosamide 100 milliGRAM(s) Oral two times a day  lidocaine   4% Patch 1 Patch Transdermal every 24 hours  melatonin 5 milliGRAM(s) Oral at bedtime  metoprolol tartrate 37.5 milliGRAM(s) Oral every 6 hours  multivitamin/minerals/iron Oral Solution (CENTRUM) 15 milliLiter(s) Oral daily  pantoprazole    Tablet 40 milliGRAM(s) Oral before breakfast  potassium chloride    Tablet ER 20 milliEquivalent(s) Oral once  sodium chloride 0.9%. 1000 milliLiter(s) (10 mL/Hr) IV Continuous <Continuous>    MEDICATIONS  (PRN):  acetaminophen     Tablet .. 650 milliGRAM(s) Oral every 6 hours PRN Mild Pain (1 - 3)  oxyCODONE    IR 5 milliGRAM(s) Oral every 4 hours PRN Moderate Pain (4 - 6)        53 year old  Male with expanding aortic arch and descending aortic aneurysm   S/P AVR  Aortic arch replacement  S/P CABG (SVG to RCA).  S/P Aorto left axillary by pass.  S/P TEVAR to descending aorta.  Long postoperative course complicated by vent dependent respiratory failure, renal failure, thrombocytopenia, serratia pneumonia, bilateral pleural effusions requiring bilateral pigtails.  Pigtails removed yesterday.  Hemodynamically stable.  Good oxygenation.  Improving renal failure.  Diuresing well.      My plan includes :  Anti seizure Rx.  Statin and Betablocker.  Close hemodynamic, ventilatory and drain monitoring and management  Monitor for arrhythmias and monitor parameters for organ perfusion  Monitor neurologic status  Monitor renal function.  Head of the bed should remain elevated to 45 deg .   Chest PT and IS will be encouraged  Monitor adequacy of oxygenation and ventilation and attempt to wean oxygen  Nutritional goals will be met using po eventually , ensure adequate caloric intake and monitor the same  Stress ulcer and VTE prophylaxis will be achieved    Glycemic control is satisfactory  Electrolytes have been repleted as necessary and wound care has been carried out. Pain control has been achieved.   Aggressive physical therapy and early mobility and ambulation goals will be met   The family was updated about the course and plan  CRITICAL CARE TIME SPENT in evaluation and management, reassessments, review and interpretation of labs and x-rays, ventilator and hemodynamic management, formulating a plan and coordinating care: ___30____ MIN.  Time does not include procedural time.  CTICU ATTENDING     					    José Miguel Torres MD

## 2023-04-09 NOTE — PROGRESS NOTE ADULT - SUBJECTIVE AND OBJECTIVE BOX
INTERVAL COURSE  Afebrile, leukocytosis improving, BUN, Cr trending down   FB -1L   No issues overnight    VITALS  Vital Signs Last 24 Hrs  T(C): 36.8 (09 Apr 2023 09:07), Max: 37.3 (08 Apr 2023 21:08)  T(F): 98.2 (09 Apr 2023 09:07), Max: 99.1 (08 Apr 2023 21:08)  HR: 100 (09 Apr 2023 09:00) (76 - 105)  BP: 123/84 (09 Apr 2023 09:00) (103/59 - 142/77)  BP(mean): 100 (09 Apr 2023 09:00) (73 - 110)  RR: 16 (09 Apr 2023 09:00) (12 - 18)  SpO2: 98% (09 Apr 2023 09:00) (95% - 100%)  Parameters below as of 09 Apr 2023 09:00  Patient On (Oxygen Delivery Method): room air    I&O's Summary  08 Apr 2023 07:01  -  09 Apr 2023 07:00  --------------------------------------------------------  IN: 305 mL / OUT: 1200 mL / NET: -895 mL    09 Apr 2023 07:01  -  09 Apr 2023 09:52  --------------------------------------------------------  IN: 140 mL / OUT: 225 mL / NET: -85 mL    PHYSICAL EXAM  General: A&Ox 3; NAD  Respiratory: CTAB/L  Cardiovascular: Sinus tachycardia   Gastrointestinal: Soft; NTND   Extremities: Warm, anasarca improved except mild pedal edema   harvest site with improving erythema   Neurological:  CNII-XII grossly intact; no obvious focal deficits    MEDICATIONS  (STANDING):  aspirin  chewable 81 milliGRAM(s) Oral daily  ceFAZolin   IVPB 1000 milliGRAM(s) IV Intermittent every 12 hours  chlorhexidine 2% Cloths 1 Application(s) Topical daily  collagenase Ointment 1 Application(s) Topical daily  dextrose 50% Injectable 50 milliLiter(s) IV Push every 15 minutes  dextrose 50% Injectable 25 milliLiter(s) IV Push every 15 minutes  divalproex  milliGRAM(s) Oral <User Schedule>  heparin   Injectable 5000 Unit(s) SubCutaneous every 8 hours  lacosamide 100 milliGRAM(s) Oral two times a day  lidocaine   4% Patch 1 Patch Transdermal every 24 hours  melatonin 5 milliGRAM(s) Oral at bedtime  metoprolol tartrate 25 milliGRAM(s) Oral every 6 hours  multivitamin/minerals/iron Oral Solution (CENTRUM) 15 milliLiter(s) Oral daily  pantoprazole    Tablet 40 milliGRAM(s) Oral before breakfast  potassium chloride    Tablet ER 20 milliEquivalent(s) Oral once  sodium chloride 0.9%. 1000 milliLiter(s) (10 mL/Hr) IV Continuous <Continuous>    MEDICATIONS  (PRN):  acetaminophen     Tablet .. 650 milliGRAM(s) Oral every 6 hours PRN Mild Pain (1 - 3)  oxyCODONE    IR 5 milliGRAM(s) Oral every 4 hours PRN Moderate Pain (4 - 6)      LABS                        9.7    14.99 )-----------( 279      ( 09 Apr 2023 03:32 )             29.8     04-09    135  |  98  |  34<H>  ----------------------------<  83  4.1   |  28  |  2.28<H>    Ca    8.8      09 Apr 2023 03:32  Phos  3.4     04-09  Mg     1.8     04-09    TPro  6.0  /  Alb  2.3<L>  /  TBili  0.4  /  DBili  x   /  AST  17  /  ALT  <5<L>  /  AlkPhos  86  04-09    LIVER FUNCTIONS - ( 09 Apr 2023 03:32 )  Alb: 2.3 g/dL / Pro: 6.0 g/dL / ALK PHOS: 86 U/L / ALT: <5 U/L / AST: 17 U/L / GGT: x           PT/INR - ( 09 Apr 2023 03:32 )   PT: 14.6 sec;   INR: 1.22          PTT - ( 09 Apr 2023 03:32 )  PTT:31.0 sec    IMAGING/EKG/ETC  Reviewed.

## 2023-04-09 NOTE — PROGRESS NOTE ADULT - ASSESSMENT
52 year old male, current every day marijuana use w/pmh of HTN, type A aortic dissection s/p Dacron grafts, AV resuspension in 2013,CAD s/p CABG x 1 SVG to RCA (5/2013 with Dr. Medina), seizure disorder (last episode on 7/4/22) presents for a follow up visit for evaluation and management of his aortic pathology and deemed a candidate for a reop total arch replacement given aortic aneurysm and chronic dissection.  S/p AVR/ Arch replacement/ aorto-axillary bypass- CABG x 1 and TEVAR   3/20  Postop course with worsening renal failure and sig acidosis started on CVVHD  Afib 3/23  Extubated 03/29  completed 7 day course of zosyn for serratia VAP last day 4/2   A/p   - MS and neuro exam intact- continue AEDs  - Postop afib converted to sinus- IV and PO amio loaded not on maintenance amio- continue lopressor 25 mg q8  Titrating according to MAPs > 70 for adequate renal perfusion   - Renal failure-iATN given long operation and clamp time/ required CVVHD for acidosis though non oliguric renal injury-->off dialysis   HD cath removed 4/2- improving renal fx with normal lytes and bicarb, responding well to diuretics maintaining Neg FB, anasarca improved   will hold off on bumex today- To replete K> 4 and Mg> 2  Iron panel added for postop Anemia--> will follow   Improving leukocytosis, Continue Ancef for harvest site cellulitis/ 7 day course last dose tomorrow   Continue ICU prophylactic measures  Optimize nutrition, ambulation and daily PT  PT reeval today for dispo planning    ATTENDING: I have personally and independently provided 30 Min of critical care services.

## 2023-04-09 NOTE — PROGRESS NOTE ADULT - ASSESSMENT
54 yo M with non-oliguric iATN i/s/o replacement of aortic arch and TAVR on 3/20- prolonged OR time and massive transfusion and other blood products. Initiated on CVVHD (3/21-3/24) for clearance and volume. Started on diuresis on 3/24 with good response but remained vol overloaded, so started iHD 3/27 for volume and intermittent AQ on 3/31, now improving off KRT    Assessment/Plan:   #Non-oliguric iATN due to intra-op hemodynamic changes, dialysis dependent  BCr 1.2, eGFR 67 (3/9)  UA (3/25) w/ proteinuria and mod blood w/o RBCs.  Showing some ATN recovery, U/O improving  CVVHD (3/21- 3/24)  AQ (3/31-4/2)    Plan:  Continue with bumex 2mg daily  sCr trending down, cautiously optimistic can remain off HD  strict I&Os  BMP per icu protocol  Maintain MAP >70 for renal perfusion     #Anemia  -Check iron profile and ferritin    Discussed w/ CTICU team

## 2023-04-09 NOTE — PROGRESS NOTE ADULT - REASON FOR ADMISSION
expanding aortic arch and descending aortic aneurysm

## 2023-04-10 ENCOUNTER — TRANSCRIPTION ENCOUNTER (OUTPATIENT)
Age: 53
End: 2023-04-10

## 2023-04-10 VITALS
SYSTOLIC BLOOD PRESSURE: 124 MMHG | DIASTOLIC BLOOD PRESSURE: 85 MMHG | OXYGEN SATURATION: 97 % | HEART RATE: 87 BPM | RESPIRATION RATE: 18 BRPM

## 2023-04-10 LAB
ALBUMIN SERPL ELPH-MCNC: 2.1 G/DL — LOW (ref 3.3–5)
ALP SERPL-CCNC: 74 U/L — SIGNIFICANT CHANGE UP (ref 40–120)
ALT FLD-CCNC: <5 U/L — LOW (ref 10–45)
ANION GAP SERPL CALC-SCNC: 6 MMOL/L — SIGNIFICANT CHANGE UP (ref 5–17)
APTT BLD: 31.3 SEC — SIGNIFICANT CHANGE UP (ref 27.5–35.5)
AST SERPL-CCNC: 16 U/L — SIGNIFICANT CHANGE UP (ref 10–40)
BILIRUB SERPL-MCNC: 0.3 MG/DL — SIGNIFICANT CHANGE UP (ref 0.2–1.2)
BUN SERPL-MCNC: 30 MG/DL — HIGH (ref 7–23)
CALCIUM SERPL-MCNC: 8.3 MG/DL — LOW (ref 8.4–10.5)
CHLORIDE SERPL-SCNC: 102 MMOL/L — SIGNIFICANT CHANGE UP (ref 96–108)
CO2 SERPL-SCNC: 28 MMOL/L — SIGNIFICANT CHANGE UP (ref 22–31)
CREAT SERPL-MCNC: 2.02 MG/DL — HIGH (ref 0.5–1.3)
EGFR: 38 ML/MIN/1.73M2 — LOW
GLUCOSE SERPL-MCNC: 82 MG/DL — SIGNIFICANT CHANGE UP (ref 70–99)
HCT VFR BLD CALC: 25.3 % — LOW (ref 39–50)
HGB BLD-MCNC: 8.1 G/DL — LOW (ref 13–17)
INR BLD: 1.22 — HIGH (ref 0.88–1.16)
MAGNESIUM SERPL-MCNC: 1.8 MG/DL — SIGNIFICANT CHANGE UP (ref 1.6–2.6)
MCHC RBC-ENTMCNC: 30.6 PG — SIGNIFICANT CHANGE UP (ref 27–34)
MCHC RBC-ENTMCNC: 32 GM/DL — SIGNIFICANT CHANGE UP (ref 32–36)
MCV RBC AUTO: 95.5 FL — SIGNIFICANT CHANGE UP (ref 80–100)
NRBC # BLD: 0 /100 WBCS — SIGNIFICANT CHANGE UP (ref 0–0)
PHOSPHATE SERPL-MCNC: 3.5 MG/DL — SIGNIFICANT CHANGE UP (ref 2.5–4.5)
PLATELET # BLD AUTO: 237 K/UL — SIGNIFICANT CHANGE UP (ref 150–400)
POTASSIUM SERPL-MCNC: 4 MMOL/L — SIGNIFICANT CHANGE UP (ref 3.5–5.3)
POTASSIUM SERPL-SCNC: 4 MMOL/L — SIGNIFICANT CHANGE UP (ref 3.5–5.3)
PROT SERPL-MCNC: 5.3 G/DL — LOW (ref 6–8.3)
PROTHROM AB SERPL-ACNC: 14.6 SEC — HIGH (ref 10.5–13.4)
RBC # BLD: 2.65 M/UL — LOW (ref 4.2–5.8)
RBC # FLD: 14.6 % — HIGH (ref 10.3–14.5)
SODIUM SERPL-SCNC: 136 MMOL/L — SIGNIFICANT CHANGE UP (ref 135–145)
WBC # BLD: 12.18 K/UL — HIGH (ref 3.8–10.5)
WBC # FLD AUTO: 12.18 K/UL — HIGH (ref 3.8–10.5)

## 2023-04-10 RX ORDER — METOPROLOL TARTRATE 50 MG
1 TABLET ORAL
Qty: 30 | Refills: 0
Start: 2023-04-10 | End: 2023-05-09

## 2023-04-10 RX ORDER — PANTOPRAZOLE SODIUM 20 MG/1
1 TABLET, DELAYED RELEASE ORAL
Qty: 30 | Refills: 0
Start: 2023-04-10 | End: 2023-05-09

## 2023-04-10 RX ORDER — OXYCODONE HYDROCHLORIDE 5 MG/1
1 TABLET ORAL
Qty: 20 | Refills: 0
Start: 2023-04-10

## 2023-04-10 RX ORDER — BUMETANIDE 0.25 MG/ML
1 INJECTION INTRAMUSCULAR; INTRAVENOUS
Qty: 30 | Refills: 0
Start: 2023-04-10 | End: 2023-05-09

## 2023-04-10 RX ORDER — DIVALPROEX SODIUM 500 MG/1
1 TABLET, DELAYED RELEASE ORAL
Qty: 30 | Refills: 0
Start: 2023-04-10 | End: 2023-05-09

## 2023-04-10 RX ORDER — MULTIVIT-MIN/FERROUS GLUCONATE 9 MG/15 ML
1 LIQUID (ML) ORAL
Qty: 30 | Refills: 0
Start: 2023-04-10 | End: 2023-05-09

## 2023-04-10 RX ORDER — COLLAGENASE CLOSTRIDIUM HIST. 250 UNIT/G
1 OINTMENT (GRAM) TOPICAL
Qty: 1 | Refills: 0
Start: 2023-04-10 | End: 2023-05-09

## 2023-04-10 RX ORDER — ATORVASTATIN CALCIUM 80 MG/1
1 TABLET, FILM COATED ORAL
Qty: 30 | Refills: 0
Start: 2023-04-10

## 2023-04-10 RX ORDER — ASPIRIN/CALCIUM CARB/MAGNESIUM 324 MG
1 TABLET ORAL
Qty: 30 | Refills: 0
Start: 2023-04-10

## 2023-04-10 RX ORDER — METOCLOPRAMIDE HCL 10 MG
10 TABLET ORAL ONCE
Refills: 0 | Status: COMPLETED | OUTPATIENT
Start: 2023-04-10 | End: 2023-04-10

## 2023-04-10 RX ORDER — ACETAMINOPHEN 500 MG
2 TABLET ORAL
Qty: 30 | Refills: 0
Start: 2023-04-10

## 2023-04-10 RX ORDER — ATORVASTATIN CALCIUM 80 MG/1
1 TABLET, FILM COATED ORAL
Qty: 30 | Refills: 0
Start: 2023-04-10 | End: 2023-05-09

## 2023-04-10 RX ORDER — LACOSAMIDE 50 MG/1
1 TABLET ORAL
Qty: 60 | Refills: 0
Start: 2023-04-10 | End: 2023-05-09

## 2023-04-10 RX ORDER — DIVALPROEX SODIUM 500 MG/1
3 TABLET, DELAYED RELEASE ORAL
Qty: 90 | Refills: 0
Start: 2023-04-10 | End: 2023-05-09

## 2023-04-10 RX ADMIN — Medication 1 APPLICATION(S): at 11:20

## 2023-04-10 RX ADMIN — Medication 37.5 MILLIGRAM(S): at 11:20

## 2023-04-10 RX ADMIN — Medication 650 MILLIGRAM(S): at 14:07

## 2023-04-10 RX ADMIN — LACOSAMIDE 100 MILLIGRAM(S): 50 TABLET ORAL at 17:23

## 2023-04-10 RX ADMIN — Medication 37.5 MILLIGRAM(S): at 17:23

## 2023-04-10 RX ADMIN — Medication 650 MILLIGRAM(S): at 13:30

## 2023-04-10 RX ADMIN — Medication 15 MILLILITER(S): at 14:05

## 2023-04-10 RX ADMIN — Medication 81 MILLIGRAM(S): at 11:20

## 2023-04-10 RX ADMIN — OXYCODONE HYDROCHLORIDE 5 MILLIGRAM(S): 5 TABLET ORAL at 07:44

## 2023-04-10 RX ADMIN — Medication 37.5 MILLIGRAM(S): at 07:10

## 2023-04-10 RX ADMIN — DIVALPROEX SODIUM 750 MILLIGRAM(S): 500 TABLET, DELAYED RELEASE ORAL at 18:41

## 2023-04-10 RX ADMIN — LACOSAMIDE 100 MILLIGRAM(S): 50 TABLET ORAL at 07:11

## 2023-04-10 RX ADMIN — OXYCODONE HYDROCHLORIDE 5 MILLIGRAM(S): 5 TABLET ORAL at 07:11

## 2023-04-10 RX ADMIN — OXYCODONE HYDROCHLORIDE 5 MILLIGRAM(S): 5 TABLET ORAL at 16:30

## 2023-04-10 RX ADMIN — Medication 100 MILLIGRAM(S): at 07:15

## 2023-04-10 RX ADMIN — Medication 100 MILLIGRAM(S): at 17:23

## 2023-04-10 RX ADMIN — HEPARIN SODIUM 5000 UNIT(S): 5000 INJECTION INTRAVENOUS; SUBCUTANEOUS at 07:14

## 2023-04-10 RX ADMIN — OXYCODONE HYDROCHLORIDE 5 MILLIGRAM(S): 5 TABLET ORAL at 01:00

## 2023-04-10 RX ADMIN — OXYCODONE HYDROCHLORIDE 5 MILLIGRAM(S): 5 TABLET ORAL at 15:37

## 2023-04-10 RX ADMIN — OXYCODONE HYDROCHLORIDE 5 MILLIGRAM(S): 5 TABLET ORAL at 00:16

## 2023-04-10 RX ADMIN — CHLORHEXIDINE GLUCONATE 1 APPLICATION(S): 213 SOLUTION TOPICAL at 07:15

## 2023-04-10 RX ADMIN — DIVALPROEX SODIUM 750 MILLIGRAM(S): 500 TABLET, DELAYED RELEASE ORAL at 07:14

## 2023-04-10 RX ADMIN — LIDOCAINE 1 PATCH: 4 CREAM TOPICAL at 03:00

## 2023-04-10 RX ADMIN — PANTOPRAZOLE SODIUM 40 MILLIGRAM(S): 20 TABLET, DELAYED RELEASE ORAL at 07:11

## 2023-04-10 RX ADMIN — Medication 10 MILLIGRAM(S): at 07:14

## 2023-04-10 NOTE — DISCHARGE NOTE NURSING/CASE MANAGEMENT/SOCIAL WORK - PATIENT PORTAL LINK FT
You can access the FollowMyHealth Patient Portal offered by [facility] by registering at the following website: [email or web address]. By joining [facility]’s FollowMyHealth portal, you will also be able to view your health information using other applications (apps) compatible with our system.

## 2023-04-10 NOTE — DISCHARGE NOTE PROVIDER - CARE PROVIDER_API CALL
Elvis Pierre)  Surgery; Thoracic and Cardiac Surgery  [address] [place] [address], [address]th [place] [address]  Phone: [phone]  Fax: [phone]  Scheduled Appointment: 04/26/2023 12:30 PM    Edu Neville)  [facility] Hypertension  [address] [place] [id number]  Phone: [phone]  Fax: [phone]  Follow Up Time:     Jacqueline Gonsales ()  Internal Medicine  [address] [place] [address]  Phone: [phone]  Fax: [phone]  Follow Up Time:

## 2023-04-10 NOTE — DISCHARGE NOTE PROVIDER - CARE PROVIDERS DIRECT ADDRESSES
[email or web address],[email or web address],DirectAddress_Unknown

## 2023-04-10 NOTE — DISCHARGE NOTE PROVIDER - NSDCCPTREATMENT_GEN_ALL_CORE_FT
PRINCIPAL PROCEDURE  Procedure: Replacement of transverse aortic arch  Findings and Treatment: and aorto to axillary bypass      SECONDARY PROCEDURE  Procedure: Second stage thoracic endovascular aortic repair (TEVAR)  Findings and Treatment:   Thoraxflex

## 2023-04-10 NOTE — DISCHARGE NOTE PROVIDER - NSDCMRMEDTOKEN_GEN_ALL_CORE_FT
acetaminophen 325 mg oral tablet: 2 tab(s) orally every 6 hours as needed for Mild Pain (1 - 3)  oxyCODONE 5 mg oral tablet: 1 tab(s) orally every 4 hours as needed for Moderate Pain (4 - 6) MDD: 4  Rolling Walker: Use as directed   acetaminophen 325 mg oral tablet: 2 tab(s) orally every 6 hours as needed for Mild Pain (1 - 3)  Aspirin Enteric Coated 81 mg oral delayed release tablet: 1 tab(s) orally once a day  atorvastatin 20 mg oral tablet: 1 tab(s) orally once a day  bumetanide 2 mg oral tablet: 1 tab(s) orally once a day  collagenase 250 units/g topical ointment: Apply topically to affected area once a day 1 Apply topically to affected area once a day  oxyCODONE 5 mg oral tablet: 1 tab(s) orally every 4 hours as needed for Moderate Pain (4 - 6) MDD: 4  pantoprazole 40 mg oral delayed release tablet: 1 tab(s) orally once a day (before a meal)  Toprol- mg oral tablet, extended release: 1 tab(s) orally once a day   acetaminophen 325 mg oral tablet: 2 tab(s) orally every 6 hours as needed for Mild Pain (1 - 3)  Aspirin Enteric Coated 81 mg oral delayed release tablet: 1 tab(s) orally once a day  atorvastatin 20 mg oral tablet: 1 tab(s) orally once a day  bumetanide 2 mg oral tablet: 1 tab(s) orally once a day  collagenase 250 units/g topical ointment: Apply topically to affected area once a day 1 Apply topically to affected area once a day  divalproex sodium 250 mg oral tablet, extended release: 3 tab(s) orally once a day  lacosamide 100 mg oral tablet: 1 tab(s) orally 2 times a day MDD: 2  oxyCODONE 5 mg oral tablet: 1 tab(s) orally every 4 hours as needed for Moderate Pain (4 - 6) MDD: 4  pantoprazole 40 mg oral delayed release tablet: 1 tab(s) orally once a day (before a meal)  Toprol- mg oral tablet, extended release: 1 tab(s) orally once a day   acetaminophen 325 mg oral tablet: 2 tab(s) orally every 6 hours as needed for Mild Pain (1 - 3)  Aspirin Enteric Coated 81 mg oral delayed release tablet: 1 tab(s) orally once a day  atorvastatin 20 mg oral tablet: 1 tab(s) orally once a day  bumetanide 2 mg oral tablet: 1 tab(s) orally once a day  collagenase 250 units/g topical ointment: Apply topically to affected area once a day 1 Apply topically to affected area once a day  Depakote  mg oral tablet, extended release: 1 tab(s) orally once a day  divalproex sodium 250 mg oral tablet, extended release: 1 tab(s) orally once a day  lacosamide 100 mg oral tablet: 1 tab(s) orally 2 times a day MDD: 2  oxyCODONE 5 mg oral tablet: 1 tab(s) orally every 4 hours as needed for Moderate Pain (4 - 6) MDD: 4  pantoprazole 40 mg oral delayed release tablet: 1 tab(s) orally once a day (before a meal)  Toprol- mg oral tablet, extended release: 1 tab(s) orally once a day

## 2023-04-10 NOTE — DISCHARGE NOTE PROVIDER - HOSPITAL COURSE
52 year old male, current every day marijuana use with a past medical hx of HTN, type A aortic dissection s/p Dacron grafts, AV resuspension in 2013,CAD s/p CABG x 1 SVG to RCA (5/2013 with Dr. 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 On 3/21 he underwent a Total Arch Replacement via Aorta to axillary cannulation. Intraoperatively patient received 7 PRBCs, 4 FFP, 3 platelets, 15 cryo and 1000 of FEIBA. He arrived to the ICU intubated on Epi and Levo. Overnight he had profound acidosis requiring inotropic support with Milrinone and Armaan and  CVVH. Initially woke and followed commands but later had seizures. Epilepsy was consulted and antiepileptic drug levels were send and doses were readjusted. POD#1 the acidosis was resolved and volume removal via cvvh was initiated. POD#2 patient was no longer following commands prompting a CT Head which confirmed no acute stroke. Hypoxia started to improve with volume removal. POD#3 the patient resumed following commands appropriately. POD#4 patient started to make good urine while on a bumex gtt. Although making urine, patient was still not clearing BUN and potassium so he had a session of intermittent HD. On 3/29 the patient was extubated to high flow. POD#8 a left sided pigtail was placed for worsening pleural effusion. He was also bronch'd and placed on BIPAP for poor respiratory effort and secretions. On POD#9 he had intermittent HD followed by aggressive volume removal with aquapheresis. POD#12 He was challenged with Bumex again with good response and thought to no longer require dialysis. POD#15 a right sided pigtail was placed for worsening pleural effusion. Both pigtails were removed without incident by POD#18. POD#19 patient was seen by physical therapy who recommended that the patient go to an acute rehab facility. The patietn and his wife are refusing to go to any facility and are requesting to be discharged home. As per Dr. Pierre on POD#20 patient is medically ready for discharge home. He will go home on Bumex and will have close follow up with nephrology.    52 year old male, current every day marijuana use with a past medical hx of HTN, type A aortic dissection s/p Dacron grafts, AV resuspension in 2013,CAD s/p CABG x 1 SVG to RCA (5/2013 with Dr. 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 On 3/21 he underwent a Total Arch Replacement via Aorta to axillary cannulation. Intraoperatively patient received 7 PRBCs, 4 FFP, 3 platelets, 15 cryo and 1000 of FEIBA. He arrived to the ICU intubated on Epi and Levo. Overnight he had profound acidosis requiring inotropic support with Milrinone and Armaan and  CVVH. Initially woke and followed commands but later had seizures. Epilepsy was consulted and antiepileptic drug levels were send and doses were readjusted. POD#1 the acidosis was resolved and volume removal via cvvh was initiated. POD#2 patient was no longer following commands prompting a CT Head which confirmed no acute stroke. Hypoxia started to improve with volume removal. POD#3 the patient resumed following commands appropriately. POD#4 patient started to make good urine while on a bumex gtt. Although making urine, patient was still not clearing BUN and potassium so he had a session of intermittent HD. On 3/29 the patient was extubated to high flow. POD#8 a left sided pigtail was placed for worsening pleural effusion. He was also bronch'd and placed on BIPAP for poor respiratory effort and secretions. On POD#9 he had intermittent HD followed by aggressive volume removal with aquapheresis. POD#12 He was challenged with Bumex again with good response and thought to no longer require dialysis. POD#15 a right sided pigtail was placed for worsening pleural effusion. Both pigtails were removed without incident by POD#18. POD#19 patient was seen by physical therapy who recommended that the patient go to an acute rehab facility. The patietn and his wife are refusing to go to any facility and are requesting to be discharged home. As per Dr. Pierre on POD#20 patient is medically ready for discharge home. He will go home on Bumex 2mg PO daily  and will have close follow up with nephrology.       Special instruction for wound care.   Of note he has a skin tear on his right patella and Left upper arm:   Site measures 12 x 7 with scattered soft eschar to edges, minimal slough. Pt denies problems with ROM, no s/s of infection noted. De-roofed blisters noted over left upper arm, no s/s of infection to these sites.   Wound care recommend continuing Collagenase to left knee daily.     Physical Exam:   GEN: NAD, looks comfortable  Psych: Mood appropriate  Neuro: A&Ox3.  No focal deficits.  Moving all extremities.   HEENT: No obvious abnormalities  CV: S1S2, regular, no murmurs appreciated.  No carotid bruits.  No JVD  Lungs: Decrease lung sounds at bilateral bases.   ABD: Soft, non-tender, non-distended.  +Bowel sounds  EXT: Warm and well perfused.  1+ JANUARY noted bilaterally.   Musculoskeletal: Moving all extremities with normal ROM, no joint swelling, Right knee 12 x7cm with scatter soft eschar to edges, minimal slough. Left upper arm with reroofed blister. Left upper back with pressure wound from pigtail stop cock. No drainage or fluctuation.   PV: Pedal pulses palpable      CARDIAC SURGERY DISCHARGE CHECKLIST:           Surgical Valve        [ x] Aspirin, [  ] Contraindicated, Reason_______________________________        [x ] Bumex, [  ] Contraindicated, Reason_______________________________             Duration: _____        [ x ] Beta-Blocker, [  ] Contraindicated, Reason_______________________________

## 2023-04-10 NOTE — DISCHARGE NOTE PROVIDER - PROVIDER TOKENS
PROVIDER:[TOKEN:[[id number]],SCHEDULEDAPPT:[04/26/2023],SCHEDULEDAPPTTIME:[12:30 PM]],PROVIDER:[TOKEN:[[id number]]],PROVIDER:[TOKEN:[[id number]]]

## 2023-04-11 ENCOUNTER — APPOINTMENT (OUTPATIENT)
Dept: CARE COORDINATION | Facility: HOME HEALTH | Age: 53
End: 2023-04-11

## 2023-04-11 RX ORDER — ATENOLOL 100 MG/1
100 TABLET ORAL TWICE DAILY
Refills: 0 | Status: DISCONTINUED | COMMUNITY
Start: 2022-11-21 | End: 2023-04-11

## 2023-04-11 RX ORDER — LOSARTAN POTASSIUM 100 MG/1
100 TABLET, FILM COATED ORAL DAILY
Qty: 30 | Refills: 6 | Status: DISCONTINUED | COMMUNITY
Start: 2022-11-21 | End: 2023-04-11

## 2023-04-11 RX ORDER — AMLODIPINE BESYLATE 10 MG/1
10 TABLET ORAL
Qty: 135 | Refills: 0 | Status: DISCONTINUED | COMMUNITY
Start: 2022-01-04 | End: 2023-04-11

## 2023-04-11 RX ORDER — LACOSAMIDE 150 MG/1
150 TABLET ORAL
Refills: 0 | Status: DISCONTINUED | COMMUNITY
Start: 2022-09-07 | End: 2023-04-11

## 2023-04-11 RX ORDER — DIVALPROEX SODIUM 500 MG/1
500 TABLET, EXTENDED RELEASE ORAL TWICE DAILY
Refills: 0 | Status: DISCONTINUED | COMMUNITY
Start: 2023-03-06 | End: 2023-04-11

## 2023-04-11 RX ORDER — ATORVASTATIN CALCIUM 20 MG/1
20 TABLET, FILM COATED ORAL
Qty: 90 | Refills: 0 | Status: DISCONTINUED | COMMUNITY
Start: 2022-09-08 | End: 2023-04-11

## 2023-04-11 RX ORDER — KRILL/OM-3/DHA/EPA/PHOSPHO/AST 1000-230MG
81 CAPSULE ORAL
Refills: 0 | Status: DISCONTINUED | COMMUNITY
End: 2023-04-11

## 2023-04-11 NOTE — HISTORY OF PRESENT ILLNESS
[FreeTextEntry1] : FY: [facility] \par 24-hour discharge follow-up and assessment \par \par Ovidio Villalobos is a 52 year old male, current every day marijuana use with a past medical hx of \par HTN, type A aortic dissection s/p Dacron grafts, AV resuspension in 2013,CAD s/p CABG x 1 SVG to RCA (5/2013 with Dr. 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  \par \par On 3/21 he underwent a Total Arch Replacement via Aorta to axillary cannulation. Intraoperatively patient received 7 PRBCs, 4 FFP, 3 platelets, 15 cryo and 1000 of FEIBA. He arrived to the ICU intubated on Epi and Levo. Overnight he had profound acidosis requiring inotropic support with Milrinone and Armaan and CVVH. Initially woke and followed commands but later had seizures. Epilepsy was consulted and antiepileptic drug levels were send and doses were readjusted. POD#1 the acidosis was resolved and volume removal via cvvh was initiated. POD#2 patient was no longer following commands prompting a CT Head which confirmed no acute stroke. Hypoxia started to improve with volume removal. POD#3 the patient resumed following commands appropriately. POD#4 patient started to make good urine while on a bumex gtt. Although making urine, patient was still not clearing BUN and potassium so he had a session of intermittent HD.  \par \par On 3/29 the patient was extubated to high flow. POD#8 a left sided pigtail was placed for worsening pleural effusion. He was also bronch'd and placed on BIPAP for poor respiratory effort and secretions. On POD#9 he had intermittent HD followed by aggressive volume removal with aquapheresis. POD#12 He was challenged with Bumex again with good response and thought to no longer require dialysis. POD#15 a right sided pigtail was placed for worsening pleural effusion. Both pigtails were removed without incident by POD#18. POD#19 patient was seen by physical therapy who recommended that the patient go to an acute rehab facility. The patient and his wife are refusing to go to any facility and are requesting to be discharged home. As per Dr. Pierre on POD#20 patient is medically ready for discharge home. He will go home on Bumex 2mg PO daily and will have close follow up with nephrology; discharged to home on 4/10/2023.  \par   \par  \par Post-op incisions assessment\par MSI c/di margins well approximated; left axillary incision covered with dermabond c/d/i; no drainage erythema or edema noted.

## 2023-04-11 NOTE — PHYSICAL EXAM
[Sclera] : the sclera and conjunctiva were normal [Neck Appearance] : the appearance of the neck was normal [] : no respiratory distress [Respiration, Rhythm And Depth] : normal respiratory rhythm and effort [Breast Appearance] : normal in appearance [Skin Color & Pigmentation] : normal skin color and pigmentation [Deep Tendon Reflexes (DTR)] : deep tendon reflexes were 2+ and symmetric [Sensation] : the sensory exam was normal to light touch and pinprick [No Focal Deficits] : no focal deficits [Oriented To Time, Place, And Person] : oriented to person, place, and time [Impaired Insight] : insight and judgment were intact [Affect] : the affect was normal [FreeTextEntry1] : skin tear on R patella and TAZ dressing not removed

## 2023-04-11 NOTE — ASSESSMENT
[FreeTextEntry1] : ASSESSMENT \par \par Patient recovering well at home s/p total Arch Replacement via Aorta to axillary cannulation and CABG x1, accompanied by spouse Ronnie Villalobos. Reviewed all medications and dosages with patient understanding. Patient has all medications in home and is taking as prescribed. Pain controlled with current medication regimen. No new symptoms, issues or concerns. Reports ambulating around with RW and one person assist. Denies chest pain, SOB/TAYLOR, nausea/vomiting, constipation/diarrhea. \par \par *Ensure Santyl is ONLY applied to bed of wound on R patella avoid placing on skin to avoid increased wound margin from enzymatic breakdown. \par \par Follow Up: \par Elvis Pierre) Surgery; Thoracic and Cardiac Surgery: Scheduled Appointment: 04/26/2023 12:30 PM \par   \par [facility] discussed recommended Follow Up Time: \par   \par Jacqueline Gonsales () Internal Medicine: Follow Up Time discussed \par \par [facility] RN: SOC 4/11/2023---> PT referral to be sent from visit today\par \par [facility] team will continue to follow up with patient's status. NP/CCC roles explained with patient understanding, contact information provided. Patient agrees to call with any questions, issues or concerns. Worsening symptoms reviewed with patient with reiteration and understanding.\par \par SEIZURE PRECAUTIONS\par \par Take your medicines exactly as directed – At the right times, and at the right doses.\par \par ?Tell your provider about any side effects that you have. That way, you can work together to find the best medicine for you.\par \par ?Do not let your prescription run out. (Stopping anti-seizure medicine suddenly can put you at risk of seizures.)\par \par ?While on anti-seizure medicines, check with your doctor before starting any new medicines. Anti-seizure medicines can interact with prescription and non-prescription medicines, and with herbal drugs. Mixing them can increase side effects or make them not work as well. ABSTAIN FROM MARIJUANA USE.\par \par ?Avoid alcohol. Alcohol can increase the risk of seizures, affect the way seizure medicines work, and increase side effects from anti-seizure medicines.\par \par POST OP PLAN\par \par *Adhere to DASH diet\par \par *Do not drive or operate machinery, No heavy lifting/straining, Showering allowed, Stairs allowed, Walking - Indoors allowed, Walking - Outdoors allowed \par \par *Walk daily as tolerated and use your incentive spirometer 10 times every hour while you are awake. Walk around the apartment and transition slowly to stairs and outdoors as tolerated, avoid extreme temperatures when outdoors.\par  \par *Please weigh yourself daily. If you notice over a 3 pound weight gain in 3 days, this is a sign you are likely retaining too much fluid. It is imperative you call our right away with unexplained rapid weight gain. A scale was mailed to your home.\par  \par *Please continue to wear the compression stockings given to you in the hospital at home. This is a way to prevent fluid from building up in your legs. You can remove intermittently for breaks or to wash.\par  \par *No driving or strenuous activity/exercise until cleared by your surgeon. \par  \par *Gently clean your incisions with unscented/antibacterial soap and water, pat dry. You may leave them open to air. \par  \par *Call your doctor if you have shortness of breath, chest pain not relieved by pain medication, dizziness, fever >100.5, or increased redness or drainage from incisions. \par \par Sternal Precautions\par Sternal precautions are used to help protect your sternum (breastbone) after open chest surgery. Wires are placed during surgery to hold the sternum together as it heals. Sternal precautions help prevent the wires from cutting through the sternum. The precautions also help prevent the sternum from coming apart from an injury, and prevent pain and bleeding. You may need to use the precautions for up to 12 weeks after surgery. It is important to follow the instructions carefully. An injury to the healing sternum can be life-threatening.\par \par General sternal precautions: Start slowly and do more as you get stronger. Pain medicine might make it harder for you to know when to slow down or be careful. Stop immediately if you hear a crunch or pop in your sternum.\par \par Protect your sternum. Hug a pillow to your chest or cross your arms over your chest when you laugh, sneeze, or cough.\par \par Be careful when you get into or out of a chair or bed. Hug a pillow or cross your arms when you stand or sit. Do not twist as you move. Use only your legs to sit and stand. You may need to use a raised toilet seat if you have trouble standing up without using your arms. \par \par Ask when you may take a bath or shower. You may need to use a bath chair if you have trouble getting into or out of the tub. Do not use a grab bar.\par \par Do not lift or carry anything heavier than 5 pounds. For example, a gallon of milk weighs 8 pounds.\par \par Keep your arms down as much as possible. Do not put your arms out to the side, behind you, or over your head. Do not let anyone pull your arms to help you move or dress. Do not reach for items.\par \par Do not push or pull anything. Examples include a car door or a vacuum .\par \par Do not drive while you are healing. Your surgeon will tell you when it is safe for you to start driving again.\par \par

## 2023-04-12 ENCOUNTER — TRANSCRIPTION ENCOUNTER (OUTPATIENT)
Age: 53
End: 2023-04-12

## 2023-04-12 DIAGNOSIS — G40.909 EPILEPSY, UNSPECIFIED, NOT INTRACTABLE, WITHOUT STATUS EPILEPTICUS: ICD-10-CM

## 2023-04-12 DIAGNOSIS — E87.20 ACIDOSIS, UNSPECIFIED: ICD-10-CM

## 2023-04-12 DIAGNOSIS — E87.6 HYPOKALEMIA: ICD-10-CM

## 2023-04-12 DIAGNOSIS — F12.99 CANNABIS USE, UNSPECIFIED WITH UNSPECIFIED CANNABIS-INDUCED DISORDER: ICD-10-CM

## 2023-04-12 DIAGNOSIS — I10 ESSENTIAL (PRIMARY) HYPERTENSION: ICD-10-CM

## 2023-04-12 DIAGNOSIS — E83.39 OTHER DISORDERS OF PHOSPHORUS METABOLISM: ICD-10-CM

## 2023-04-12 DIAGNOSIS — L97.829 NON-PRESSURE CHRONIC ULCER OF OTHER PART OF LEFT LOWER LEG WITH UNSPECIFIED SEVERITY: ICD-10-CM

## 2023-04-12 DIAGNOSIS — D62 ACUTE POSTHEMORRHAGIC ANEMIA: ICD-10-CM

## 2023-04-12 DIAGNOSIS — M62.82 RHABDOMYOLYSIS: ICD-10-CM

## 2023-04-12 DIAGNOSIS — R29.719 NIHSS SCORE 19: ICD-10-CM

## 2023-04-12 DIAGNOSIS — D69.6 THROMBOCYTOPENIA, UNSPECIFIED: ICD-10-CM

## 2023-04-12 DIAGNOSIS — T41.205A ADVERSE EFFECT OF UNSPECIFIED GENERAL ANESTHETICS, INITIAL ENCOUNTER: ICD-10-CM

## 2023-04-12 DIAGNOSIS — I71.012 DISSECTION OF DESCENDING THORACIC AORTA: ICD-10-CM

## 2023-04-12 DIAGNOSIS — I11.0 HYPERTENSIVE HEART DISEASE WITH HEART FAILURE: ICD-10-CM

## 2023-04-12 DIAGNOSIS — Z79.82 LONG TERM (CURRENT) USE OF ASPIRIN: ICD-10-CM

## 2023-04-12 DIAGNOSIS — I35.1 NONRHEUMATIC AORTIC (VALVE) INSUFFICIENCY: ICD-10-CM

## 2023-04-12 DIAGNOSIS — E16.2 HYPOGLYCEMIA, UNSPECIFIED: ICD-10-CM

## 2023-04-12 DIAGNOSIS — I71.23 ANEURYSM OF THE DESCENDING THORACIC AORTA, WITHOUT RUPTURE: ICD-10-CM

## 2023-04-12 DIAGNOSIS — I71.21 ANEURYSM OF THE ASCENDING AORTA, WITHOUT RUPTURE: ICD-10-CM

## 2023-04-12 DIAGNOSIS — N17.0 ACUTE KIDNEY FAILURE WITH TUBULAR NECROSIS: ICD-10-CM

## 2023-04-12 DIAGNOSIS — J91.8 PLEURAL EFFUSION IN OTHER CONDITIONS CLASSIFIED ELSEWHERE: ICD-10-CM

## 2023-04-12 DIAGNOSIS — N17.9 ACUTE KIDNEY FAILURE, UNSPECIFIED: ICD-10-CM

## 2023-04-12 DIAGNOSIS — I25.810 ATHEROSCLEROSIS OF CORONARY ARTERY BYPASS GRAFT(S) WITHOUT ANGINA PECTORIS: ICD-10-CM

## 2023-04-12 DIAGNOSIS — I47.1 SUPRAVENTRICULAR TACHYCARDIA: ICD-10-CM

## 2023-04-12 DIAGNOSIS — R53.83 OTHER FATIGUE: ICD-10-CM

## 2023-04-12 DIAGNOSIS — J93.9 PNEUMOTHORAX, UNSPECIFIED: ICD-10-CM

## 2023-04-12 DIAGNOSIS — I50.41 ACUTE COMBINED SYSTOLIC (CONGESTIVE) AND DIASTOLIC (CONGESTIVE) HEART FAILURE: ICD-10-CM

## 2023-04-12 DIAGNOSIS — G92.8 OTHER TOXIC ENCEPHALOPATHY: ICD-10-CM

## 2023-04-12 DIAGNOSIS — R57.0 CARDIOGENIC SHOCK: ICD-10-CM

## 2023-04-12 DIAGNOSIS — I25.10 ATHEROSCLEROTIC HEART DISEASE OF NATIVE CORONARY ARTERY WITHOUT ANGINA PECTORIS: ICD-10-CM

## 2023-04-12 DIAGNOSIS — R57.8 OTHER SHOCK: ICD-10-CM

## 2023-04-12 DIAGNOSIS — E87.0 HYPEROSMOLALITY AND HYPERNATREMIA: ICD-10-CM

## 2023-04-12 DIAGNOSIS — E87.5 HYPERKALEMIA: ICD-10-CM

## 2023-04-12 DIAGNOSIS — J96.01 ACUTE RESPIRATORY FAILURE WITH HYPOXIA: ICD-10-CM

## 2023-04-12 DIAGNOSIS — I48.91 UNSPECIFIED ATRIAL FIBRILLATION: ICD-10-CM

## 2023-04-12 DIAGNOSIS — I71.010 DISSECTION OF ASCENDING AORTA: ICD-10-CM

## 2023-04-12 DIAGNOSIS — J15.6 PNEUMONIA DUE TO OTHER GRAM-NEGATIVE BACTERIA: ICD-10-CM

## 2023-04-12 DIAGNOSIS — Z95.1 PRESENCE OF AORTOCORONARY BYPASS GRAFT: ICD-10-CM

## 2023-04-13 ENCOUNTER — INPATIENT (INPATIENT)
Facility: HOSPITAL | Age: 53
LOS: 0 days | Discharge: HOME HEALTH | DRG: 555 | End: 2023-04-14
Attending: THORACIC SURGERY (CARDIOTHORACIC VASCULAR SURGERY) | Admitting: THORACIC SURGERY (CARDIOTHORACIC VASCULAR SURGERY)
Payer: COMMERCIAL

## 2023-04-13 ENCOUNTER — OUTPATIENT (OUTPATIENT)
Dept: OUTPATIENT SERVICES | Facility: HOSPITAL | Age: 53
LOS: 1 days | End: 2023-04-13
Payer: COMMERCIAL

## 2023-04-13 ENCOUNTER — APPOINTMENT (OUTPATIENT)
Dept: CARDIOTHORACIC SURGERY | Facility: CLINIC | Age: 53
End: 2023-04-13

## 2023-04-13 VITALS
TEMPERATURE: 99 F | SYSTOLIC BLOOD PRESSURE: 130 MMHG | DIASTOLIC BLOOD PRESSURE: 56 MMHG | HEIGHT: 72 IN | RESPIRATION RATE: 18 BRPM | HEART RATE: 91 BPM | WEIGHT: 139.99 LBS | OXYGEN SATURATION: 97 %

## 2023-04-13 VITALS
RESPIRATION RATE: 18 BRPM | TEMPERATURE: 98.8 F | DIASTOLIC BLOOD PRESSURE: 82 MMHG | SYSTOLIC BLOOD PRESSURE: 128 MMHG | HEART RATE: 90 BPM | OXYGEN SATURATION: 96 %

## 2023-04-13 DIAGNOSIS — Z79.82 LONG TERM (CURRENT) USE OF ASPIRIN: ICD-10-CM

## 2023-04-13 DIAGNOSIS — I10 ESSENTIAL (PRIMARY) HYPERTENSION: ICD-10-CM

## 2023-04-13 DIAGNOSIS — I25.10 ATHEROSCLEROTIC HEART DISEASE OF NATIVE CORONARY ARTERY WITHOUT ANGINA PECTORIS: ICD-10-CM

## 2023-04-13 DIAGNOSIS — Z95.1 PRESENCE OF AORTOCORONARY BYPASS GRAFT: ICD-10-CM

## 2023-04-13 DIAGNOSIS — Z95.828 PRESENCE OF OTHER VASCULAR IMPLANTS AND GRAFTS: Chronic | ICD-10-CM

## 2023-04-13 DIAGNOSIS — E87.70 FLUID OVERLOAD, UNSPECIFIED: ICD-10-CM

## 2023-04-13 DIAGNOSIS — E43 UNSPECIFIED SEVERE PROTEIN-CALORIE MALNUTRITION: ICD-10-CM

## 2023-04-13 DIAGNOSIS — G40.909 EPILEPSY, UNSPECIFIED, NOT INTRACTABLE, WITHOUT STATUS EPILEPTICUS: ICD-10-CM

## 2023-04-13 DIAGNOSIS — M79.89 OTHER SPECIFIED SOFT TISSUE DISORDERS: ICD-10-CM

## 2023-04-13 DIAGNOSIS — L81.9 DISORDER OF PIGMENTATION, UNSPECIFIED: ICD-10-CM

## 2023-04-13 DIAGNOSIS — Z95.1 PRESENCE OF AORTOCORONARY BYPASS GRAFT: Chronic | ICD-10-CM

## 2023-04-13 DIAGNOSIS — F12.99 CANNABIS USE, UNSPECIFIED WITH UNSPECIFIED CANNABIS-INDUCED DISORDER: ICD-10-CM

## 2023-04-13 DIAGNOSIS — Z95.828 PRESENCE OF OTHER VASCULAR IMPLANTS AND GRAFTS: ICD-10-CM

## 2023-04-13 LAB
A1C WITH ESTIMATED AVERAGE GLUCOSE RESULT: 5.3 % — SIGNIFICANT CHANGE UP (ref 4–5.6)
ALBUMIN SERPL ELPH-MCNC: 2.8 G/DL — LOW (ref 3.3–5)
ALP SERPL-CCNC: 94 U/L — SIGNIFICANT CHANGE UP (ref 40–120)
ALT FLD-CCNC: <5 U/L — LOW (ref 10–45)
ANION GAP SERPL CALC-SCNC: 10 MMOL/L — SIGNIFICANT CHANGE UP (ref 5–17)
ANION GAP SERPL CALC-SCNC: 8 MMOL/L — SIGNIFICANT CHANGE UP (ref 5–17)
ANISOCYTOSIS BLD QL: SLIGHT — SIGNIFICANT CHANGE UP
APTT BLD: 32.3 SEC — SIGNIFICANT CHANGE UP (ref 27.5–35.5)
APTT BLD: 33.1 SEC — SIGNIFICANT CHANGE UP (ref 27.5–35.5)
AST SERPL-CCNC: 16 U/L — SIGNIFICANT CHANGE UP (ref 10–40)
BASOPHILS # BLD AUTO: 0.24 K/UL — HIGH (ref 0–0.2)
BASOPHILS NFR BLD AUTO: 1.7 % — SIGNIFICANT CHANGE UP (ref 0–2)
BILIRUB SERPL-MCNC: 0.4 MG/DL — SIGNIFICANT CHANGE UP (ref 0.2–1.2)
BLD GP AB SCN SERPL QL: NEGATIVE — SIGNIFICANT CHANGE UP
BUN SERPL-MCNC: 24 MG/DL — HIGH (ref 7–23)
BUN SERPL-MCNC: 24 MG/DL — HIGH (ref 7–23)
CALCIUM SERPL-MCNC: 8.2 MG/DL — LOW (ref 8.4–10.5)
CALCIUM SERPL-MCNC: 8.4 MG/DL — SIGNIFICANT CHANGE UP (ref 8.4–10.5)
CHLORIDE SERPL-SCNC: 100 MMOL/L — SIGNIFICANT CHANGE UP (ref 96–108)
CHLORIDE SERPL-SCNC: 99 MMOL/L — SIGNIFICANT CHANGE UP (ref 96–108)
CO2 SERPL-SCNC: 29 MMOL/L — SIGNIFICANT CHANGE UP (ref 22–31)
CO2 SERPL-SCNC: 29 MMOL/L — SIGNIFICANT CHANGE UP (ref 22–31)
CREAT SERPL-MCNC: 1.85 MG/DL — HIGH (ref 0.5–1.3)
CREAT SERPL-MCNC: 1.91 MG/DL — HIGH (ref 0.5–1.3)
EGFR: 41 ML/MIN/1.73M2 — LOW
EGFR: 43 ML/MIN/1.73M2 — LOW
EOSINOPHIL # BLD AUTO: 0 K/UL — SIGNIFICANT CHANGE UP (ref 0–0.5)
EOSINOPHIL NFR BLD AUTO: 0 % — SIGNIFICANT CHANGE UP (ref 0–6)
ESTIMATED AVERAGE GLUCOSE: 105 MG/DL — SIGNIFICANT CHANGE UP (ref 68–114)
GLUCOSE SERPL-MCNC: 78 MG/DL — SIGNIFICANT CHANGE UP (ref 70–99)
GLUCOSE SERPL-MCNC: 86 MG/DL — SIGNIFICANT CHANGE UP (ref 70–99)
HCT VFR BLD CALC: 24.2 % — LOW (ref 39–50)
HCT VFR BLD CALC: 25.3 % — LOW (ref 39–50)
HGB BLD-MCNC: 7.8 G/DL — LOW (ref 13–17)
HGB BLD-MCNC: 7.9 G/DL — LOW (ref 13–17)
INR BLD: 1.3 — HIGH (ref 0.88–1.16)
INR BLD: 1.32 — HIGH (ref 0.88–1.16)
LYMPHOCYTES # BLD AUTO: 0.13 K/UL — LOW (ref 1–3.3)
LYMPHOCYTES # BLD AUTO: 0.9 % — LOW (ref 13–44)
MACROCYTES BLD QL: SLIGHT — SIGNIFICANT CHANGE UP
MANUAL SMEAR VERIFICATION: SIGNIFICANT CHANGE UP
MCHC RBC-ENTMCNC: 30.3 PG — SIGNIFICANT CHANGE UP (ref 27–34)
MCHC RBC-ENTMCNC: 31.2 GM/DL — LOW (ref 32–36)
MCHC RBC-ENTMCNC: 31.2 PG — SIGNIFICANT CHANGE UP (ref 27–34)
MCHC RBC-ENTMCNC: 32.2 GM/DL — SIGNIFICANT CHANGE UP (ref 32–36)
MCV RBC AUTO: 96.8 FL — SIGNIFICANT CHANGE UP (ref 80–100)
MCV RBC AUTO: 96.9 FL — SIGNIFICANT CHANGE UP (ref 80–100)
MONOCYTES # BLD AUTO: 0.75 K/UL — SIGNIFICANT CHANGE UP (ref 0–0.9)
MONOCYTES NFR BLD AUTO: 5.3 % — SIGNIFICANT CHANGE UP (ref 2–14)
NEUTROPHILS # BLD AUTO: 12.84 K/UL — HIGH (ref 1.8–7.4)
NEUTROPHILS NFR BLD AUTO: 90.3 % — HIGH (ref 43–77)
NEUTS BAND # BLD: 0.9 % — SIGNIFICANT CHANGE UP (ref 0–8)
NRBC # BLD: 0 /100 WBCS — SIGNIFICANT CHANGE UP (ref 0–0)
NT-PROBNP SERPL-SCNC: 6985 PG/ML — HIGH (ref 0–300)
NT-PROBNP SERPL-SCNC: 7701 PG/ML — HIGH (ref 0–300)
PLAT MORPH BLD: NORMAL — SIGNIFICANT CHANGE UP
PLATELET # BLD AUTO: 267 K/UL — SIGNIFICANT CHANGE UP (ref 150–400)
PLATELET # BLD AUTO: 277 K/UL — SIGNIFICANT CHANGE UP (ref 150–400)
POIKILOCYTOSIS BLD QL AUTO: SLIGHT — SIGNIFICANT CHANGE UP
POLYCHROMASIA BLD QL SMEAR: SLIGHT — SIGNIFICANT CHANGE UP
POTASSIUM SERPL-MCNC: 4.2 MMOL/L — SIGNIFICANT CHANGE UP (ref 3.5–5.3)
POTASSIUM SERPL-MCNC: 5.2 MMOL/L — SIGNIFICANT CHANGE UP (ref 3.5–5.3)
POTASSIUM SERPL-SCNC: 4.2 MMOL/L — SIGNIFICANT CHANGE UP (ref 3.5–5.3)
POTASSIUM SERPL-SCNC: 5.2 MMOL/L — SIGNIFICANT CHANGE UP (ref 3.5–5.3)
PROT SERPL-MCNC: 6.5 G/DL — SIGNIFICANT CHANGE UP (ref 6–8.3)
PROTHROM AB SERPL-ACNC: 15.5 SEC — HIGH (ref 10.5–13.4)
PROTHROM AB SERPL-ACNC: 15.7 SEC — HIGH (ref 10.5–13.4)
RBC # BLD: 2.5 M/UL — LOW (ref 4.2–5.8)
RBC # BLD: 2.61 M/UL — LOW (ref 4.2–5.8)
RBC # FLD: 14.2 % — SIGNIFICANT CHANGE UP (ref 10.3–14.5)
RBC # FLD: 14.4 % — SIGNIFICANT CHANGE UP (ref 10.3–14.5)
RBC BLD AUTO: ABNORMAL
RH IG SCN BLD-IMP: POSITIVE — SIGNIFICANT CHANGE UP
SARS-COV-2 RNA SPEC QL NAA+PROBE: NEGATIVE — SIGNIFICANT CHANGE UP
SMUDGE CELLS # BLD: PRESENT — SIGNIFICANT CHANGE UP
SODIUM SERPL-SCNC: 136 MMOL/L — SIGNIFICANT CHANGE UP (ref 135–145)
SODIUM SERPL-SCNC: 139 MMOL/L — SIGNIFICANT CHANGE UP (ref 135–145)
TARGETS BLD QL SMEAR: SLIGHT — SIGNIFICANT CHANGE UP
TROPONIN T SERPL-MCNC: 0.14 NG/ML — CRITICAL HIGH (ref 0–0.01)
TSH SERPL-MCNC: 2.17 UIU/ML — SIGNIFICANT CHANGE UP (ref 0.27–4.2)
VARIANT LYMPHS # BLD: 0.9 % — SIGNIFICANT CHANGE UP (ref 0–6)
WBC # BLD: 13.36 K/UL — HIGH (ref 3.8–10.5)
WBC # BLD: 14.08 K/UL — HIGH (ref 3.8–10.5)
WBC # FLD AUTO: 13.36 K/UL — HIGH (ref 3.8–10.5)
WBC # FLD AUTO: 14.08 K/UL — HIGH (ref 3.8–10.5)

## 2023-04-13 PROCEDURE — 99285 EMERGENCY DEPT VISIT HI MDM: CPT

## 2023-04-13 PROCEDURE — 93010 ELECTROCARDIOGRAM REPORT: CPT

## 2023-04-13 PROCEDURE — 71046 X-RAY EXAM CHEST 2 VIEWS: CPT

## 2023-04-13 PROCEDURE — 71046 X-RAY EXAM CHEST 2 VIEWS: CPT | Mod: 26

## 2023-04-13 RX ORDER — ATORVASTATIN CALCIUM 80 MG/1
40 TABLET, FILM COATED ORAL AT BEDTIME
Refills: 0 | Status: DISCONTINUED | OUTPATIENT
Start: 2023-04-13 | End: 2023-04-14

## 2023-04-13 RX ORDER — COLLAGENASE CLOSTRIDIUM HIST. 250 UNIT/G
1 OINTMENT (GRAM) TOPICAL DAILY
Refills: 0 | Status: DISCONTINUED | OUTPATIENT
Start: 2023-04-13 | End: 2023-04-14

## 2023-04-13 RX ORDER — METOPROLOL TARTRATE 50 MG
100 TABLET ORAL DAILY
Refills: 0 | Status: DISCONTINUED | OUTPATIENT
Start: 2023-04-13 | End: 2023-04-14

## 2023-04-13 RX ORDER — ASPIRIN/CALCIUM CARB/MAGNESIUM 324 MG
81 TABLET ORAL DAILY
Refills: 0 | Status: DISCONTINUED | OUTPATIENT
Start: 2023-04-13 | End: 2023-04-14

## 2023-04-13 RX ORDER — SODIUM CHLORIDE 9 MG/ML
3 INJECTION INTRAMUSCULAR; INTRAVENOUS; SUBCUTANEOUS EVERY 8 HOURS
Refills: 0 | Status: DISCONTINUED | OUTPATIENT
Start: 2023-04-13 | End: 2023-04-14

## 2023-04-13 RX ORDER — DIVALPROEX SODIUM 500 MG/1
250 TABLET, DELAYED RELEASE ORAL DAILY
Refills: 0 | Status: DISCONTINUED | OUTPATIENT
Start: 2023-04-13 | End: 2023-04-14

## 2023-04-13 RX ORDER — DIVALPROEX SODIUM 500 MG/1
500 TABLET, DELAYED RELEASE ORAL DAILY
Refills: 0 | Status: DISCONTINUED | OUTPATIENT
Start: 2023-04-13 | End: 2023-04-14

## 2023-04-13 RX ORDER — ACETAMINOPHEN 500 MG
650 TABLET ORAL EVERY 6 HOURS
Refills: 0 | Status: DISCONTINUED | OUTPATIENT
Start: 2023-04-13 | End: 2023-04-14

## 2023-04-13 RX ORDER — LACOSAMIDE 50 MG/1
100 TABLET ORAL
Refills: 0 | Status: DISCONTINUED | OUTPATIENT
Start: 2023-04-13 | End: 2023-04-14

## 2023-04-13 RX ORDER — ATORVASTATIN CALCIUM 80 MG/1
40 TABLET, FILM COATED ORAL AT BEDTIME
Refills: 0 | Status: DISCONTINUED | OUTPATIENT
Start: 2023-04-13 | End: 2023-04-13

## 2023-04-13 RX ORDER — BUMETANIDE 0.25 MG/ML
2 INJECTION INTRAMUSCULAR; INTRAVENOUS EVERY 12 HOURS
Refills: 0 | Status: DISCONTINUED | OUTPATIENT
Start: 2023-04-13 | End: 2023-04-14

## 2023-04-13 RX ORDER — PANTOPRAZOLE SODIUM 20 MG/1
40 TABLET, DELAYED RELEASE ORAL
Refills: 0 | Status: DISCONTINUED | OUTPATIENT
Start: 2023-04-13 | End: 2023-04-14

## 2023-04-13 RX ORDER — ATORVASTATIN CALCIUM 80 MG/1
20 TABLET, FILM COATED ORAL AT BEDTIME
Refills: 0 | Status: DISCONTINUED | OUTPATIENT
Start: 2023-04-13 | End: 2023-04-14

## 2023-04-13 RX ORDER — HEPARIN SODIUM 5000 [USP'U]/ML
5000 INJECTION INTRAVENOUS; SUBCUTANEOUS EVERY 8 HOURS
Refills: 0 | Status: DISCONTINUED | OUTPATIENT
Start: 2023-04-13 | End: 2023-04-14

## 2023-04-13 RX ADMIN — BUMETANIDE 2 MILLIGRAM(S): 0.25 INJECTION INTRAMUSCULAR; INTRAVENOUS at 23:19

## 2023-04-13 RX ADMIN — Medication 650 MILLIGRAM(S): at 18:19

## 2023-04-13 RX ADMIN — LACOSAMIDE 100 MILLIGRAM(S): 50 TABLET ORAL at 18:19

## 2023-04-13 RX ADMIN — SODIUM CHLORIDE 3 MILLILITER(S): 9 INJECTION INTRAMUSCULAR; INTRAVENOUS; SUBCUTANEOUS at 23:44

## 2023-04-13 RX ADMIN — HEPARIN SODIUM 5000 UNIT(S): 5000 INJECTION INTRAVENOUS; SUBCUTANEOUS at 23:20

## 2023-04-13 RX ADMIN — ATORVASTATIN CALCIUM 20 MILLIGRAM(S): 80 TABLET, FILM COATED ORAL at 23:19

## 2023-04-13 NOTE — H&P ADULT - ASSESSMENT
52 year old male, current every day marijuana use with a past medical hx of HTN, type A aortic dissection s/p Dacron grafts, AV resuspension in 2013,CAD s/p CABG x 1 SVG to RCA (5/2013 with Dr. 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 On 3/21 he underwent a Total Arch Replacement via Aorta to axillary cannulation. Pts ICU course was complicated by needing rounds of dialysis. Pt then began making his own urine and was started on bumex which was successfully diuresing him, Pt was discharged home on POD 20. Pt returned to the out patient office on 4/13 for concerns over ecchymosis over right fifth toe and SOB since discharge. Pt admits to not walking as much and only ambulating around the house but denies trauma to the area. Pt and wife also feel that his legs have become more swollen. pt has been making good urine since discharge and has no urinary issues.  Pt was seen in the ED and re admitted for diuresis and evaluation of toe discoloration.     Neurovascular:   -No delirium. Pain well controlled with current regimen.  -acetaminophen     Tablet .. 650 milliGRAM(s) every 6 hours  seizure history   -divalproex  milliGRAM(s) daily  -divalproex  milliGRAM(s) daily  -lacosamide 100 milliGRAM(s) two times a day    Cardiovascular:   -Hemodynamically stable. HR controlled  #hx of aortic arch rep  -continue aspirin and Plavix for acute graft patency,  -continue statin for long term graft patency  BP    HR: 91 (91 - 91)  BP: 130/56 (130/56 - 130/56)  -continue buMETAnide Injectable 2 milliGRAM(s) every 12 hours  metoprolol succinate  milliGRAM(s) daily  , for BP control      Respiratory:   02 Sat = 98% on RA.  RR: 18 (18 - 18)  SpO2: 97% (97% - 97%)  -Wean to RA from for O2 Sat > 93%.  -Encourage Cough, deep breathing and Use of IS 10x / hr while awake.  -Chest PT 4xdaily    GI:   Stable  Continue pantoprazole    Tablet 40 milliGRAM(s) before breakfast    -PO DASH diet    Renal / :   BUN/Cr Stable 24  /1.91    -Continue to monitor I/O's.    Endocrine:    Blood sugar stable   A1C: 4.4    TSH: 0.626      Hematologic:  H/H stable 7.9  /25.3    -DVT prophylaxis with Heparin sq    ID:  -Afebrile  -Continue to observe for SIRS/Sepsis Syndrome.  T(C): 37.2, Max: 37.2 (04-13-23 @ 16:04)    Disposition:   52 year old male, current every day marijuana use with a past medical hx of HTN, type A aortic dissection s/p Dacron grafts, AV resuspension in 2013,CAD s/p CABG x 1 SVG to RCA (5/2013 with Dr. 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 On 3/21 he underwent a Total Arch Replacement via Aorta to axillary cannulation. Pts ICU course was complicated by needing rounds of dialysis. Pt then began making his own urine and was started on bumex which was successfully diuresing him, Pt was discharged home on POD 20. Pt returned to the out patient office on 4/13 for concerns over ecchymosis over right fifth toe and SOB since discharge. Pt admits to not walking as much and only ambulating around the house but denies trauma to the area. Pt and wife also feel that his legs have become more swollen. pt has been making good urine since discharge and has no urinary issues.  Pt was seen in the ED and re admitted for diuresis and evaluation of toe discoloration.     Neurovascular:   -No delirium. Pain well controlled with current regimen.  -acetaminophen     Tablet .. 650 milliGRAM(s) every 6 hours  seizure history   -divalproex  milliGRAM(s) daily  -divalproex  milliGRAM(s) daily  -lacosamide 100 milliGRAM(s) two times a day    Cardiovascular:   -Hemodynamically stable. HR controlled  #hx of total arch replacement   -Continue ASA  -Continue Statin   -Continue Toprol   -TTE to be preformed   -resumed prior diuretic regiment of Bumex 2 mg IV BID  HR: 91 (91 - 91)  BP: 130/56 (130/56 - 130/56)      Respiratory:   02 Sat = 98% on RA.  RR: 18 (18 - 18)  SpO2: 97% (97% - 97%)  -Wean to RA from for O2 Sat > 93%.  -Encourage Cough, deep breathing and Use of IS 10x / hr while awake.  -Chest PT 4xdaily  -PA/Lat stable no effusions appreciated     GI:   Stable  Continue pantoprazole    Tablet 40 milliGRAM(s) before breakfast  -senna/miralax   -PO DASH diet    Renal / :   BUN/Cr Stable 24/1.91  -resumed diuresis plan with Bumex 2mg IV BID  -currently making urine at home, continue to trend BUN/Cr and urine output  -Continue to monitor I/O's.    Endocrine:    Blood sugar stable   A1C: 4.4  TSH: 0.626    Hematologic:  H/H stable 7.9/25.3, repeat CBC and BMP tonight, pt at DC H and H: 8.1/25.3  -DVT prophylaxis with Heparin sq    Extremities:  #toe discoloration   -Arterial and venous duplexes pending,   -pt has full sensation, warmth, strength to the lower extremities.     ID:  -Afebrile  -prior knee skin wound healing well, continue ointment.   -Continue to observe for SIRS/Sepsis Syndrome.  T(C): 37.2, Max: 37.2 (04-13-23 @ 16:04)    Disposition:  -DC when medically appropriate

## 2023-04-13 NOTE — ED CLERICAL - NS ED CLERK NOTE PRE-ARRIVAL INFORMATION; ADDITIONAL PRE-ARRIVAL INFORMATION
This patient is enrolled in the [facility] program and has undergone a cardiac surgery procedure and has active care navigation. This patient can be followed up by the care navigation team within 24 hours. To arrange close follow-up or to obtain additional clinical information about this patient, please call the contact number above. Please call the cardiac surgery team once patient is registered at [phone] for consultation PRIOR to disposition decision.  The patient recently underwent a cardiac surgery procedure and the team can assist in acute medical management.

## 2023-04-13 NOTE — DISCUSSION/SUMMARY
[Regressing] : is regressing [Fair Pain Control] : has fair pain control [No Sign of Infection] : is showing no signs of infection

## 2023-04-13 NOTE — H&P ADULT - NSHPREVIEWOFSYSTEMS_GEN_ALL_CORE
Review of Systems  CONSTITUTIONAL:  Denies Fevers / chills, sweats, fatigue, weight loss, weight gain                                      NEURO:  Denies changes in sensation, seizures, syncope, confusion                                                                            EYES:  Denies Blurry vision, discharge, pain, loss of vision                                                                                    ENMT:  Denies Difficulty hearing, vertigo, dysphagia, epistaxis, recent dental work                                       CV:  Denies Chest pain, palpitations, TAYLOR, orthopnea                                                                                          RESPIRATORY:  Denies Wheezing, SOB, cough / sputum, hemoptysis                                                                GI:  Denies Nausea, vomiting, diarrhea, constipation, melena, difficulty swallowing                                               : Denies Hematuria, dysuria, urgency, incontinence                                                                                         MUSCULOSKELETAL:  Denies arthritis, joint swelling, muscle weakness                                                             SKIN/BREAST: wound to knee and increased leg swelling, Denies rash, itching, hair loss, masses                                                                                            PSYCH:  Denies depression, anxiety, suicidal ideation                                                                                               HEME/LYMPH:  Denies bruises easily, enlarged lymph nodes, tender lymph nodes                                        ENDOCRINE:  Denies cold intolerance, heat intolerance, polydipsia

## 2023-04-13 NOTE — ED ADULT TRIAGE NOTE - CHIEF COMPLAINT QUOTE
Pt had a total arch replacement via aorta to axillary cannulation. Pt was sent by MD Elvis Pierre due to SOB and LE Edema since 3 days ago. pt sent for admission. pt presents alert and oriented, denies any discomfort. Pt had a total arch replacement via aorta to axillary cannulation. Pt was sent by MD Elvis Pierre due to SOB and LE Edema since 3 days ago, for echo to rule out pericardial effusion/ tamponade. pt sent for admission. pt presents alert and oriented, denies any discomfort.

## 2023-04-13 NOTE — ED PROVIDER NOTE - PHYSICAL EXAMINATION
General: comfortable, resting in ED  HEENT: atraumatic, no eye erythema or discharge  Pulm: no cyanosis, no added work of breathing  Cardiac: extremities warm, intact peripheral pulse  GI: no abdominal distension  Neuro: alert, conversant  Psych: neutral affect, cooperative  Msk: no gross deformity or instability, bilateral lower extremity pitting edema  Skin: no erythema or rash, left toe discoloration

## 2023-04-13 NOTE — ED PROVIDER NOTE - CLINICAL SUMMARY MEDICAL DECISION MAKING FREE TEXT BOX
Presentation concerning for possible heart failure exacerbation vs other metabolic abnormality, will send lab testing and serologies r/o ACS, hyponatremia, TREY as causes for swelling.  Discussed with cardiothoracic team, given proximity to surgery will need admission for monitoring, cardiac evaluation, and further risk stratification.

## 2023-04-13 NOTE — ED ADULT NURSE NOTE - OBJECTIVE STATEMENT
.  53years male alert mental state (AOX3) received on foot.  pt is ambulatory himself.  -complain of SOB.  Hx of a total arch replacement via aorta to axillary cannulation. pt started SOB and Lt lower leg swelling for three days. pt PCP checked echo and found r/o pericardial effusion/ tamponade. pt presents SOB.  -denied chest pain, dizziness, headache, fever, n/v/d, abdomen pain.  Pt is in the bed comfortably at this time. Will continue to monitor and document any changes.

## 2023-04-13 NOTE — PHYSICAL EXAM
[] : no respiratory distress [Auscultation Breath Sounds / Voice Sounds] : lungs were clear to auscultation bilaterally [Heart Rate And Rhythm] : heart rate was normal and rhythm regular [Heart Sounds] : normal S1 and S2 [Heart Sounds Gallop] : no gallops [Murmurs] : no murmurs [Heart Sounds Pericardial Friction Rub] : no pericardial rub [Clean] : clean [Dry] : dry [Healing Well] : healing well [___ +] : bilateral pretibial [unfilled]U+ pitting edema

## 2023-04-13 NOTE — ED ADULT NURSE NOTE - CHIEF COMPLAINT QUOTE
Pt had a total arch replacement via aorta to axillary cannulation. Pt was sent by MD Elvis Pierre due to SOB and LE Edema since 3 days ago, for echo to rule out pericardial effusion/ tamponade. pt sent for admission. pt presents alert and oriented, denies any discomfort. Yes

## 2023-04-13 NOTE — H&P ADULT - NSHPPHYSICALEXAM_GEN_ALL_CORE
GEN: NAD, looks comfortable  Psych: Mood appropriate  Neuro: A&Ox3.  No focal deficits.  Moving all extremities.   HEENT: No obvious abnormalities  CV: S1S2, regular, no murmurs appreciated.  No carotid bruits.  No JVD  Lungs: Clear B/L.  No wheezing, rales or rhonchi  ABD: Soft, non-tender, non-distended.   EXT: Warm and well perfused.  2+ lower extremity peripheral edema noted, lower extremities with a large healing knee wound, right fifth toe ecchymosis  but sensation and movement intact, Unable to palpate pulses however dopperable and well perfused/warm and no discoloring/mottling.   Musculoskeletal: Moving all extremities with normal ROM, no joint swelling  Incisions: prior MSI is clean dry and intact and healing well drain sites are clean and dry

## 2023-04-13 NOTE — H&P ADULT - HISTORY OF PRESENT ILLNESS
52 year old male, current every day marijuana use with a past medical hx of HTN, type A aortic dissection s/p Dacron grafts, AV resuspension in 2013,CAD s/p CABG x 1 SVG to RCA (5/2013 with Dr. 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 On 3/21 he underwent a Total Arch Replacement via Aorta to axillary cannulation. Pts ICU course was complicated by needing rounds of dialysis. Pt then began making his own urine and was started on bumex which was successfully diuresing him, Pt was discharged home on POD 20. Pt returned to the out patient office on 4/13 for concerns over ecchymosis over right fifth toe and SOB since discharge. Pt admits to not walking as much and only ambulating around the house but denies trauma to the area. Pt and wife also feel that his legs have become more swollen. pt has been making good urine since discharge and has no urinary issues.  Pt was seen in the ED and re admitted for diuresis.
Yes - the patient is able to be screened

## 2023-04-13 NOTE — ED PROVIDER NOTE - OBJECTIVE STATEMENT
53M with HTN aortic dissection hx, CAD s/p CABG, recent aortic arch replacement 3/21, now with extremity swelling and toe discoloration, sent by cardiothoracic team for admission.  Currently with no chest pain or shortness of breath.
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
## 2023-04-13 NOTE — PATIENT PROFILE ADULT - FALL HARM RISK - UNIVERSAL INTERVENTIONS
Central Prior Authorization Team   Phone: [phone]    PA Initiation    Medication: doxepin (SILENOR) 3 MG tablet  Insurance Company: [employer] - Phone [phone] Fax [phone]  Pharmacy Filling the Rx: [facility] [id number] [place] [address]  Filling Pharmacy Phone: [phone]  Filling Pharmacy Fax: [phone]  Start Date: 3/16/2022               Bed in lowest position, wheels locked, appropriate side rails in place/Call bell, personal items and telephone in reach/Instruct patient to call for assistance before getting out of bed or chair/Non-slip footwear when patient is out of bed/[place] to call system/Physically safe environment - no spills, clutter or unnecessary equipment/Purposeful Proactive Rounding/Room/bathroom lighting operational, light cord in reach

## 2023-04-13 NOTE — ED ADULT TRIAGE NOTE - HISTORY OF COVID-19 VACCINATION
Health Maintenance Due   Topic Date Due   • DTaP/Tdap/Td Vaccine (1 - Tdap) Never done   • Diabetes Eye Exam  08/22/2020   • Diabetes Foot Exam  01/28/2021   • Diabetes A1C  11/01/2021   • Hepatitis B Vaccine (3 of 3 - Risk Recombivax 3-dose series) 12/01/2021   • Medicare Wellness Visit  01/05/2022       Patient is due for the topics as listed above and wishes to proceed with them. Orders placed for Diabetes A1C and MWV (Medicare Wellness Visit)     Yes

## 2023-04-14 ENCOUNTER — TRANSCRIPTION ENCOUNTER (OUTPATIENT)
Age: 53
End: 2023-04-14

## 2023-04-14 VITALS
DIASTOLIC BLOOD PRESSURE: 70 MMHG | OXYGEN SATURATION: 95 % | HEART RATE: 90 BPM | SYSTOLIC BLOOD PRESSURE: 121 MMHG | RESPIRATION RATE: 16 BRPM

## 2023-04-14 LAB
ALBUMIN SERPL ELPH-MCNC: 2.2 G/DL — LOW (ref 3.3–5)
ALP SERPL-CCNC: 86 U/L — SIGNIFICANT CHANGE UP (ref 40–120)
ALT FLD-CCNC: <5 U/L — LOW (ref 10–45)
ANION GAP SERPL CALC-SCNC: 9 MMOL/L — SIGNIFICANT CHANGE UP (ref 5–17)
ANISOCYTOSIS BLD QL: SLIGHT — SIGNIFICANT CHANGE UP
APPEARANCE UR: CLEAR — SIGNIFICANT CHANGE UP
AST SERPL-CCNC: 15 U/L — SIGNIFICANT CHANGE UP (ref 10–40)
BACTERIA # UR AUTO: PRESENT /HPF
BASOPHILS # BLD AUTO: 0.1 K/UL — SIGNIFICANT CHANGE UP (ref 0–0.2)
BASOPHILS NFR BLD AUTO: 0.9 % — SIGNIFICANT CHANGE UP (ref 0–2)
BILIRUB SERPL-MCNC: 0.3 MG/DL — SIGNIFICANT CHANGE UP (ref 0.2–1.2)
BILIRUB UR-MCNC: NEGATIVE — SIGNIFICANT CHANGE UP
BUN SERPL-MCNC: 24 MG/DL — HIGH (ref 7–23)
CALCIUM SERPL-MCNC: 8.1 MG/DL — LOW (ref 8.4–10.5)
CHLORIDE SERPL-SCNC: 100 MMOL/L — SIGNIFICANT CHANGE UP (ref 96–108)
CO2 SERPL-SCNC: 28 MMOL/L — SIGNIFICANT CHANGE UP (ref 22–31)
COLOR SPEC: YELLOW — SIGNIFICANT CHANGE UP
CREAT SERPL-MCNC: 1.85 MG/DL — HIGH (ref 0.5–1.3)
DIFF PNL FLD: ABNORMAL
EGFR: 43 ML/MIN/1.73M2 — LOW
EOSINOPHIL # BLD AUTO: 0.1 K/UL — SIGNIFICANT CHANGE UP (ref 0–0.5)
EOSINOPHIL NFR BLD AUTO: 0.9 % — SIGNIFICANT CHANGE UP (ref 0–6)
EPI CELLS # UR: SIGNIFICANT CHANGE UP /HPF (ref 0–5)
GLUCOSE SERPL-MCNC: 74 MG/DL — SIGNIFICANT CHANGE UP (ref 70–99)
GLUCOSE UR QL: NEGATIVE — SIGNIFICANT CHANGE UP
HCT VFR BLD CALC: 23.6 % — LOW (ref 39–50)
HGB BLD-MCNC: 7.5 G/DL — LOW (ref 13–17)
HYPOCHROMIA BLD QL: SIGNIFICANT CHANGE UP
KETONES UR-MCNC: NEGATIVE — SIGNIFICANT CHANGE UP
LEUKOCYTE ESTERASE UR-ACNC: ABNORMAL
LYMPHOCYTES # BLD AUTO: 0.1 K/UL — LOW (ref 1–3.3)
LYMPHOCYTES # BLD AUTO: 0.9 % — LOW (ref 13–44)
MACROCYTES BLD QL: SLIGHT — SIGNIFICANT CHANGE UP
MAGNESIUM SERPL-MCNC: 1.7 MG/DL — SIGNIFICANT CHANGE UP (ref 1.6–2.6)
MANUAL SMEAR VERIFICATION: SIGNIFICANT CHANGE UP
MCHC RBC-ENTMCNC: 31 PG — SIGNIFICANT CHANGE UP (ref 27–34)
MCHC RBC-ENTMCNC: 31.8 GM/DL — LOW (ref 32–36)
MCV RBC AUTO: 97.5 FL — SIGNIFICANT CHANGE UP (ref 80–100)
MICROCYTES BLD QL: SLIGHT — SIGNIFICANT CHANGE UP
MONOCYTES # BLD AUTO: 1.23 K/UL — HIGH (ref 0–0.9)
MONOCYTES NFR BLD AUTO: 10.9 % — SIGNIFICANT CHANGE UP (ref 2–14)
NEUTROPHILS # BLD AUTO: 9.73 K/UL — HIGH (ref 1.8–7.4)
NEUTROPHILS NFR BLD AUTO: 86.4 % — HIGH (ref 43–77)
NITRITE UR-MCNC: NEGATIVE — SIGNIFICANT CHANGE UP
OVALOCYTES BLD QL SMEAR: SLIGHT — SIGNIFICANT CHANGE UP
PH UR: 6.5 — SIGNIFICANT CHANGE UP (ref 5–8)
PLAT MORPH BLD: NORMAL — SIGNIFICANT CHANGE UP
PLATELET # BLD AUTO: 260 K/UL — SIGNIFICANT CHANGE UP (ref 150–400)
POIKILOCYTOSIS BLD QL AUTO: SLIGHT — SIGNIFICANT CHANGE UP
POLYCHROMASIA BLD QL SMEAR: SLIGHT — SIGNIFICANT CHANGE UP
POTASSIUM SERPL-MCNC: 4 MMOL/L — SIGNIFICANT CHANGE UP (ref 3.5–5.3)
POTASSIUM SERPL-SCNC: 4 MMOL/L — SIGNIFICANT CHANGE UP (ref 3.5–5.3)
PROT SERPL-MCNC: 5.7 G/DL — LOW (ref 6–8.3)
PROT UR-MCNC: ABNORMAL MG/DL
RBC # BLD: 2.42 M/UL — LOW (ref 4.2–5.8)
RBC # FLD: 14.5 % — SIGNIFICANT CHANGE UP (ref 10.3–14.5)
RBC BLD AUTO: ABNORMAL
RBC CASTS # UR COMP ASSIST: < 5 /HPF — SIGNIFICANT CHANGE UP
SMUDGE CELLS # BLD: PRESENT — SIGNIFICANT CHANGE UP
SODIUM SERPL-SCNC: 137 MMOL/L — SIGNIFICANT CHANGE UP (ref 135–145)
SP GR SPEC: 1.02 — SIGNIFICANT CHANGE UP (ref 1–1.03)
SURGICAL PATHOLOGY STUDY: SIGNIFICANT CHANGE UP
UROBILINOGEN FLD QL: 0.2 E.U./DL — SIGNIFICANT CHANGE UP
WBC # BLD: 11.26 K/UL — HIGH (ref 3.8–10.5)
WBC # FLD AUTO: 11.26 K/UL — HIGH (ref 3.8–10.5)
WBC UR QL: ABNORMAL /HPF

## 2023-04-14 PROCEDURE — 86850 RBC ANTIBODY SCREEN: CPT

## 2023-04-14 PROCEDURE — 93925 LOWER EXTREMITY STUDY: CPT | Mod: 26

## 2023-04-14 PROCEDURE — 83735 ASSAY OF MAGNESIUM: CPT

## 2023-04-14 PROCEDURE — 99254 IP/OBS CNSLTJ NEW/EST MOD 60: CPT | Mod: GC

## 2023-04-14 PROCEDURE — 85610 PROTHROMBIN TIME: CPT

## 2023-04-14 PROCEDURE — 99024 POSTOP FOLLOW-UP VISIT: CPT

## 2023-04-14 PROCEDURE — 93970 EXTREMITY STUDY: CPT

## 2023-04-14 PROCEDURE — 93005 ELECTROCARDIOGRAM TRACING: CPT

## 2023-04-14 PROCEDURE — 93306 TTE W/DOPPLER COMPLETE: CPT | Mod: 26

## 2023-04-14 PROCEDURE — 36415 COLL VENOUS BLD VENIPUNCTURE: CPT

## 2023-04-14 PROCEDURE — 71045 X-RAY EXAM CHEST 1 VIEW: CPT | Mod: 26

## 2023-04-14 PROCEDURE — 81001 URINALYSIS AUTO W/SCOPE: CPT

## 2023-04-14 PROCEDURE — 80048 BASIC METABOLIC PNL TOTAL CA: CPT

## 2023-04-14 PROCEDURE — 84443 ASSAY THYROID STIM HORMONE: CPT

## 2023-04-14 PROCEDURE — 84484 ASSAY OF TROPONIN QUANT: CPT

## 2023-04-14 PROCEDURE — 80053 COMPREHEN METABOLIC PANEL: CPT

## 2023-04-14 PROCEDURE — 83036 HEMOGLOBIN GLYCOSYLATED A1C: CPT

## 2023-04-14 PROCEDURE — 85027 COMPLETE CBC AUTOMATED: CPT

## 2023-04-14 PROCEDURE — 93970 EXTREMITY STUDY: CPT | Mod: 26

## 2023-04-14 PROCEDURE — 85730 THROMBOPLASTIN TIME PARTIAL: CPT

## 2023-04-14 PROCEDURE — 93306 TTE W/DOPPLER COMPLETE: CPT

## 2023-04-14 PROCEDURE — 86901 BLOOD TYPING SEROLOGIC RH(D): CPT

## 2023-04-14 PROCEDURE — 93925 LOWER EXTREMITY STUDY: CPT

## 2023-04-14 PROCEDURE — 87635 SARS-COV-2 COVID-19 AMP PRB: CPT

## 2023-04-14 PROCEDURE — 86900 BLOOD TYPING SEROLOGIC ABO: CPT

## 2023-04-14 PROCEDURE — 99285 EMERGENCY DEPT VISIT HI MDM: CPT

## 2023-04-14 PROCEDURE — 85025 COMPLETE CBC W/AUTO DIFF WBC: CPT

## 2023-04-14 PROCEDURE — 83880 ASSAY OF NATRIURETIC PEPTIDE: CPT

## 2023-04-14 PROCEDURE — 71045 X-RAY EXAM CHEST 1 VIEW: CPT

## 2023-04-14 RX ORDER — ACETAMINOPHEN 500 MG
2 TABLET ORAL
Qty: 0 | Refills: 0 | DISCHARGE
Start: 2023-04-14

## 2023-04-14 RX ORDER — DIVALPROEX SODIUM 500 MG/1
1 TABLET, DELAYED RELEASE ORAL
Qty: 0 | Refills: 0 | DISCHARGE
Start: 2023-04-14

## 2023-04-14 RX ORDER — ATORVASTATIN CALCIUM 80 MG/1
20 TABLET, FILM COATED ORAL AT BEDTIME
Refills: 0 | Status: DISCONTINUED | OUTPATIENT
Start: 2023-04-14 | End: 2023-04-14

## 2023-04-14 RX ADMIN — Medication 81 MILLIGRAM(S): at 11:12

## 2023-04-14 RX ADMIN — SODIUM CHLORIDE 3 MILLILITER(S): 9 INJECTION INTRAMUSCULAR; INTRAVENOUS; SUBCUTANEOUS at 05:45

## 2023-04-14 RX ADMIN — PANTOPRAZOLE SODIUM 40 MILLIGRAM(S): 20 TABLET, DELAYED RELEASE ORAL at 05:17

## 2023-04-14 RX ADMIN — HEPARIN SODIUM 5000 UNIT(S): 5000 INJECTION INTRAVENOUS; SUBCUTANEOUS at 17:33

## 2023-04-14 RX ADMIN — Medication 100 MILLIGRAM(S): at 05:17

## 2023-04-14 RX ADMIN — DIVALPROEX SODIUM 250 MILLIGRAM(S): 500 TABLET, DELAYED RELEASE ORAL at 11:12

## 2023-04-14 RX ADMIN — Medication 1 APPLICATION(S): at 11:36

## 2023-04-14 RX ADMIN — LACOSAMIDE 100 MILLIGRAM(S): 50 TABLET ORAL at 17:33

## 2023-04-14 RX ADMIN — SODIUM CHLORIDE 3 MILLILITER(S): 9 INJECTION INTRAMUSCULAR; INTRAVENOUS; SUBCUTANEOUS at 14:21

## 2023-04-14 RX ADMIN — LACOSAMIDE 100 MILLIGRAM(S): 50 TABLET ORAL at 05:18

## 2023-04-14 RX ADMIN — Medication 650 MILLIGRAM(S): at 19:14

## 2023-04-14 RX ADMIN — DIVALPROEX SODIUM 500 MILLIGRAM(S): 500 TABLET, DELAYED RELEASE ORAL at 11:12

## 2023-04-14 RX ADMIN — Medication 650 MILLIGRAM(S): at 11:44

## 2023-04-14 RX ADMIN — Medication 650 MILLIGRAM(S): at 11:11

## 2023-04-14 RX ADMIN — BUMETANIDE 2 MILLIGRAM(S): 0.25 INJECTION INTRAMUSCULAR; INTRAVENOUS at 11:12

## 2023-04-14 RX ADMIN — Medication 650 MILLIGRAM(S): at 05:18

## 2023-04-14 RX ADMIN — Medication 650 MILLIGRAM(S): at 06:18

## 2023-04-14 NOTE — DISCHARGE NOTE PROVIDER - DETAILS OF MALNUTRITION DIAGNOSIS/DIAGNOSES
This patient has been assessed with a concern for Malnutrition and was treated during this hospitalization for the following Nutrition diagnosis/diagnoses:     -  04/14/2023: Severe protein-calorie malnutrition

## 2023-04-14 NOTE — DIETITIAN INITIAL EVALUATION ADULT - OTHER INFO
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 On 3/21 he underwent a Total Arch Replacement via Aorta to axillary cannulation. Pts ICU course was complicated by needing rounds of dialysis. Pt then began making his own urine and was started on bumex which was successfully diuresing him, Pt was discharged home on POD 20. Pt returned to the outpatient office on 4/13 for concerns over ecchymosis over right fifth toe and SOB since discharge. Pt admits to not walking as much and only ambulating around the house but denies trauma to the area. Pt and wife also feel that his legs have become more swollen. pt has been making good urine since discharge and has no urinary issues.  Pt was seen in the ED and re admitted for diuresis and evaluation of toe discoloration.    Pt seen by RDN at bedside for nutrition assessment. Assessment limited as pt only willing to provide brief responses to clinical questions. Subjective information obtained from RN, EMR and pt as able. Denies N/V/D/C; +BM 4/13. No abd distention noted. Skin WDL; noted with L knee wound. No pressure injuries at this time. +2 edema B/L leg/feet. Sandro 21. Denies pain/discomfort. NKFA. No cultural, Christian, or other food preferences. Currently placed on a CSTCHO/Renal/Low Sodium diet. Pt denies difficulty chewing/swallowing. Per RN, pt’s PO intake is poor. Unable to obtain complete diet history PTA. Pt kept stating he would not eat the food in-house. Pt endorses UBW 200lb, unknown timeline. Pt endorses wt loss d/t consistent poor PO intake. Per EMR, on last admission (3/23/23), pt 80.7kg/178lb. On current admission (4/13) pt 63.5kg/140lb, which is reflective of -38lb/-21% x < 1 mo. Unable to perform NFPE; visually observed moderate temporal wasting. Per ASPEN guidelines, pt meets criteria for severe acute malnutrition AEB sig wt loss, suspect < 50% EER >/= 5 days, moderate edema, moderate muscle wasting. Recommend adding Ensure Enlive ONS TID. Education deferred at this time. Please see nutrition recommendations below. Pt made aware RDN remains available. Will continue to follow per RD protocol.

## 2023-04-14 NOTE — CONSULT NOTE ADULT - SUBJECTIVE AND OBJECTIVE BOX
Attending: Dr. Smyth    HPI:  52 year old male, current every day marijuana use with a past medical hx of HTN, type A aortic dissection s/p Dacron grafts, AV resuspension in ,CAD s/p CABG x 1 SVG to RCA (2013 with Dr. 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 On 3/21 he underwent a Total Arch Replacement via Aorta to axillary cannulation. Pts ICU course was complicated by needing rounds of dialysis. Pt then began making his own urine and was started on bumex which was successfully diuresing him, Pt was discharged home on POD 20. Pt returned to the out patient office on  for concerns over ecchymosis over right fifth toe and SOB since discharge. Pt admits to not walking as much and only ambulating around the house but denies trauma to the area. Pt and wife also feel that his legs have become more swollen. pt has been making good urine since discharge and has no urinary issues.  Pt was seen in the ED and re admitted for diuresis. (2023 18:39)      PAST MEDICAL & SURGICAL HISTORY:  HTN (hypertension)      Aortic dissection      CAD (coronary artery disease)      Seizure disorder      S/P aortic bifurcation bypass graft      S/P CABG x 1          MEDICATIONS  (STANDING):  acetaminophen     Tablet .. 650 milliGRAM(s) Oral every 6 hours  aspirin enteric coated 81 milliGRAM(s) Oral daily  atorvastatin 20 milliGRAM(s) Oral at bedtime  buMETAnide Injectable 2 milliGRAM(s) IV Push every 12 hours  collagenase Ointment 1 Application(s) Topical daily  divalproex  milliGRAM(s) Oral daily  divalproex  milliGRAM(s) Oral daily  heparin   Injectable 5000 Unit(s) SubCutaneous every 8 hours  lacosamide 100 milliGRAM(s) Oral two times a day  metoprolol succinate  milliGRAM(s) Oral daily  pantoprazole    Tablet 40 milliGRAM(s) Oral before breakfast  sodium chloride 0.9% lock flush 3 milliLiter(s) IV Push every 8 hours    MEDICATIONS  (PRN):      Allergies    No Known Allergies    Intolerances        SOCIAL HISTORY:    FAMILY HISTORY:  No pertinent family history in first degree relatives        Vital Signs Last 24 Hrs  T(C): 37.1 (2023 08:56), Max: 37.2 (2023 16:04)  T(F): 98.8 (2023 08:56), Max: 98.9 (2023 16:04)  HR: 90 (2023 13:00) (70 - 91)  BP: 114/87 (2023 13:00) (114/87 - 144/87)  BP(mean): 97 (2023 13:00) (89 - 99)  RR: 16 (2023 13:00) (16 - 18)  SpO2: 90% (2023 13:00) (90% - 100%)    Parameters below as of 2023 13:00  Patient On (Oxygen Delivery Method): room air        I&O's Summary    2023 07:01  -  2023 07:00  --------------------------------------------------------  IN: 220 mL / OUT: 900 mL / NET: -680 mL    2023 07:01  -  2023 13:59  --------------------------------------------------------  IN: 400 mL / OUT: 100 mL / NET: 300 mL        Physical Exam:  General: NAD, resting comfortably  Pulmonary: normal resp effort  Cardiovascular: NSR  Abdominal: soft, NT/ND  Extremities:   Pulses:   Right:  Left:    LABS:                        7.5    11.26 )-----------( 260      ( 2023 06:09 )             23.6     04-14    137  |  100  |  24<H>  ----------------------------<  74  4.0   |  28  |  1.85<H>    Ca    8.1<L>      2023 06:09  Mg     1.7     -14    TPro  5.7<L>  /  Alb  2.2<L>  /  TBili  0.3  /  DBili  x   /  AST  15  /  ALT  <5<L>  /  AlkPhos  86  04-14    PT/INR - ( 2023 17:08 )   PT: 15.5 sec;   INR: 1.30          PTT - ( 2023 17:08 )  PTT:33.1 sec  Urinalysis Basic - ( 2023 00:55 )    Color: Yellow / Appearance: Clear / S.020 / pH: x  Gluc: x / Ketone: NEGATIVE  / Bili: Negative / Urobili: 0.2 E.U./dL   Blood: x / Protein: Trace mg/dL / Nitrite: NEGATIVE   Leuk Esterase: Small / RBC: < 5 /HPF / WBC 5-10 /HPF   Sq Epi: x / Non Sq Epi: x / Bacteria: Present /HPF      CAPILLARY BLOOD GLUCOSE        LIVER FUNCTIONS - ( 2023 06:09 )  Alb: 2.2 g/dL / Pro: 5.7 g/dL / ALK PHOS: 86 U/L / ALT: <5 U/L / AST: 15 U/L / GGT: x             Cultures:        Assessment: 53y Male    Recommendations:  -   - discussed with Chief on call  - call x [phone] with questions Attending: Dr. Smyth    HPI:  52 year old male, current every day marijuana use with a past medical hx of HTN, type A aortic dissection s/p Dacron grafts, AV resuspension in ,CAD s/p CABG x 1 SVG to RCA (2013 with Dr. 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 On 3/21 he underwent a Total Arch Replacement via Aorta to axillary cannulation. Pts ICU course was complicated by needing rounds of dialysis. Pt then began making his own urine and was started on bumex which was successfully diuresing him, Pt was discharged home on POD 20. Pt returned to the out patient office on  for concerns over ecchymosis over right fifth toe and SOB since discharge. Pt admits to not walking as much and only ambulating around the house but denies trauma to the area. Pt and wife also feel that his legs have become more swollen. pt has been making good urine since discharge and has no urinary issues.  Pt was seen in the ED and re admitted for diuresis. (2023 18:39)      PAST MEDICAL & SURGICAL HISTORY:  HTN (hypertension)      Aortic dissection      CAD (coronary artery disease)      Seizure disorder      S/P aortic bifurcation bypass graft      S/P CABG x 1          MEDICATIONS  (STANDING):  acetaminophen     Tablet .. 650 milliGRAM(s) Oral every 6 hours  aspirin enteric coated 81 milliGRAM(s) Oral daily  atorvastatin 20 milliGRAM(s) Oral at bedtime  buMETAnide Injectable 2 milliGRAM(s) IV Push every 12 hours  collagenase Ointment 1 Application(s) Topical daily  divalproex  milliGRAM(s) Oral daily  divalproex  milliGRAM(s) Oral daily  heparin   Injectable 5000 Unit(s) SubCutaneous every 8 hours  lacosamide 100 milliGRAM(s) Oral two times a day  metoprolol succinate  milliGRAM(s) Oral daily  pantoprazole    Tablet 40 milliGRAM(s) Oral before breakfast  sodium chloride 0.9% lock flush 3 milliLiter(s) IV Push every 8 hours    MEDICATIONS  (PRN):      Allergies    No Known Allergies    Intolerances        SOCIAL HISTORY:    FAMILY HISTORY:  No pertinent family history in first degree relatives        Vital Signs Last 24 Hrs  T(C): 37.1 (2023 08:56), Max: 37.2 (2023 16:04)  T(F): 98.8 (2023 08:56), Max: 98.9 (2023 16:04)  HR: 90 (2023 13:00) (70 - 91)  BP: 114/87 (2023 13:00) (114/87 - 144/87)  BP(mean): 97 (2023 13:00) (89 - 99)  RR: 16 (2023 13:00) (16 - 18)  SpO2: 90% (2023 13:00) (90% - 100%)    Parameters below as of 2023 13:00  Patient On (Oxygen Delivery Method): room air        I&O's Summary    2023 07:01  -  2023 07:00  --------------------------------------------------------  IN: 220 mL / OUT: 900 mL / NET: -680 mL    2023 07:01  -  2023 13:59  --------------------------------------------------------  IN: 400 mL / OUT: 100 mL / NET: 300 mL        Physical Exam:  General: NAD, resting comfortably  Pulmonary: normal resp effort  Cardiovascular: NSR. Midline sternotomy scar healing well.  Abdominal: soft, NT/ND  Extremities: Palpable PT and DP bilaterally. Mild discoloration over dorsal and plantar surfaces of 4th and 5th toes.    LABS:                        7.5    11.26 )-----------( 260      ( 2023 06:09 )             23.6     -14    137  |  100  |  24<H>  ----------------------------<  74  4.0   |  28  |  1.85<H>    Ca    8.1<L>      2023 06:09  Mg     1.7         TPro  5.7<L>  /  Alb  2.2<L>  /  TBili  0.3  /  DBili  x   /  AST  15  /  ALT  <5<L>  /  AlkPhos  86  04-14    PT/INR - ( 2023 17:08 )   PT: 15.5 sec;   INR: 1.30          PTT - ( 2023 17:08 )  PTT:33.1 sec  Urinalysis Basic - ( 2023 00:55 )    Color: Yellow / Appearance: Clear / S.020 / pH: x  Gluc: x / Ketone: NEGATIVE  / Bili: Negative / Urobili: 0.2 E.U./dL   Blood: x / Protein: Trace mg/dL / Nitrite: NEGATIVE   Leuk Esterase: Small / RBC: < 5 /HPF / WBC 5-10 /HPF   Sq Epi: x / Non Sq Epi: x / Bacteria: Present /HPF      CAPILLARY BLOOD GLUCOSE        LIVER FUNCTIONS - ( 2023 06:09 )  Alb: 2.2 g/dL / Pro: 5.7 g/dL / ALK PHOS: 86 U/L / ALT: <5 U/L / AST: 15 U/L / GGT: x             Cultures:        Assessment: 53y Male    Recommendations:  -   - discussed with Chief on call  - call x [phone] with questions

## 2023-04-14 NOTE — DISCHARGE NOTE PROVIDER - NSDCCPCAREPLAN_GEN_ALL_CORE_FT
PRINCIPAL DISCHARGE DIAGNOSIS  Diagnosis: Swelling of extremity  Assessment and Plan of Treatment:

## 2023-04-14 NOTE — DISCHARGE NOTE PROVIDER - NSDCMRMEDTOKEN_GEN_ALL_CORE_FT
acetaminophen 325 mg oral tablet: 2 tab(s) orally every 6 hours  Aspirin Enteric Coated 81 mg oral delayed release tablet: 1 tab(s) orally once a day  atorvastatin 20 mg oral tablet: 1 tab(s) orally once a day  bumetanide 2 mg oral tablet: 1 tab(s) orally once a day  collagenase 250 units/g topical ointment: Apply topically to affected area once a day 1 Apply topically to affected area once a day  divalproex sodium 250 mg oral tablet, extended release: 1 tab(s) orally once a day  divalproex sodium 500 mg oral tablet, extended release: 1 tab(s) orally once a day  lacosamide 100 mg oral tablet: 1 tab(s) orally 2 times a day MDD: 2  oxyCODONE 5 mg oral tablet: 1 tab(s) orally every 4 hours as needed for Moderate Pain (4 - 6) MDD: 4  pantoprazole 40 mg oral delayed release tablet: 1 tab(s) orally once a day (before a meal)  Toprol- mg oral tablet, extended release: 1 tab(s) orally once a day

## 2023-04-14 NOTE — PROGRESS NOTE ADULT - ASSESSMENT
52 year old male, current every day marijuana use with a past medical hx of HTN, type A aortic dissection s/p Dacron grafts, AV resuspension in 2013,CAD s/p CABG x 1 SVG to RCA (5/2013 with Dr. 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 On 3/21 he underwent a Total Arch Replacement via Aorta to axillary cannulation. Pts ICU course was complicated by needing rounds of dialysis. Pt then began making his own urine and was started on bumex which was successfully diuresing him, Pt was discharged home on POD 20. Pt returned to the out patient office on 4/13 for concerns over ecchymosis over right fifth toe and SOB since discharge. Pt admits to not walking as much and only ambulating around the house but denies trauma to the area. Pt and wife also feel that his legs have become more swollen. pt has been making good urine since discharge and has no urinary issues.  Overnight he was started on IV diuretics. This morning became acutely agitated and locked himself in the bathroom. He was able to be verbally deescalated. Vascular called for ecchymosis of the right 5th toe.       Neurovascular: No delirium. Pain well controlled with current regimen, Seizure history   - acetaminophen  - Depakote 750mg Daily.   - lacosamide 100mg  BID     Cardiovascular: hx of total arch replacement, TTE with normal BiV function and no effusion.,   -Continue ASA and Statin   -Continue Toprol 100mg Daily     Respiratory: RA   -Encourage Cough, deep breathing and Use of IS 10x / hr while awake.  -Chest PT 4xdaily  -PA/Lat stable no effusions appreciated     GI: DASH diet  - Continue pantoprazole, senna and miralax     Renal / : BUN/Cr 24/1.89. No need for HD at this time.   -continue Bumex 2mg IV BID  -Continue to monitor I/O's.    Endocrine:  A1c 5.3  - ISS     Hematologic: H/H stable 7.5/23.9,  -DVT prophylaxis with Heparin sq    Extremities: toe discoloration; pt has full sensation, warmth, strength to the lower extremities.   - Arterial and venous duplexes pending,   - Vascular Consulted.     ID: WBC 11.26 Afebrile  -prior knee skin wound healing well, continue ointment.   -Continue to observe for SIRS/Sepsis Syndrome.    Disposition:  -DC when medically appropriate

## 2023-04-14 NOTE — DISCHARGE NOTE PROVIDER - HOSPITAL COURSE
52 year old male, current every day marijuana use with a past medical hx of HTN, type A aortic dissection s/p Dacron grafts, AV resuspension in 2013,CAD s/p CABG x 1 SVG to RCA (5/2013 with Dr. 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 On 3/21 he underwent a Total Arch Replacement via Aorta to axillary cannulation. Pts ICU course was complicated by needing rounds of dialysis. Pt then began making his own urine and was started on bumex which was successfully diuresing him, Pt was discharged home on POD 20. Pt returned to the out patient office on 4/13 for concerns over ecchymosis over right fifth toe and SOB since discharge. Pt admits to not walking as much and only ambulating around the house but denies trauma to the area. Pt and wife also feel that his legs have become more swollen. pt has been making good urine since discharge and has no urinary issues.  Overnight he was started on IV diuretics. This morning became acutely agitated and locked himself in the bathroom. He was able to be verbally deescalated. Vascular called for ecchymosis of the right 5th toe. The       GEN: AOx3 but appears confused and agitated   Neuro:   No focal deficits.  Moving all extremities.   HEENT: No obvious abnormalities  CV: S1S2, regular, no murmurs appreciated.  No carotid bruits.  No JVD  Lungs: Clear B/L.  No wheezing, rales or rhonchi  ABD: Soft, non-tender, non-distended.  +Bowel sounds  EXT: Warm/.  2+ dependent edema to the ankles. 1+ edema in the shins bilaterally.   right fifth toe ecchymosis  but sensation and movement intact, Unable to palpate pulses however dopperable and well perfused/warm and no discoloring/mottling.  Musculoskeletal: Moving all extremities with normal ROM, no joint swelling  Incision: MSI c/d/i    52 year old male, current every day marijuana use with a past medical hx of HTN, type A aortic dissection s/p Dacron grafts, AV resuspension in 2013,CAD s/p CABG x 1 SVG to RCA (5/2013 with Dr. 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 On 3/21 he underwent a Total Arch Replacement via Aorta to axillary cannulation. Pts ICU course was complicated by needing rounds of dialysis. Pt then began making his own urine and was started on bumex which was successfully diuresing him, Pt was discharged home on POD 20. Pt returned to the out patient office on 4/13 for concerns over ecchymosis over right fifth toe and SOB since discharge. Pt admits to not walking as much and only ambulating around the house but denies trauma to the area. Pt and wife also feel that his legs have become more swollen. pt has been making good urine since discharge and has no urinary issues.  Overnight he was started on IV diuretics. This morning became acutely agitated and locked himself in the bathroom. He was able to be verbally deescalated. Vascular called for ecchymosis of the right 5th toe. Appears to be a bruise and not necrosis or ischemia. As per Dr. Pierre patient is ready for discharge home. No changes have been made to his home medications and he will follow up at previously booked outpatient appointments.       GEN: AOx3 but appears confused and agitated   Neuro:   No focal deficits.  Moving all extremities.   HEENT: No obvious abnormalities  CV: S1S2, regular, no murmurs appreciated.  No carotid bruits.  No JVD  Lungs: Clear B/L.  No wheezing, rales or rhonchi  ABD: Soft, non-tender, non-distended.  +Bowel sounds  EXT: Warm/.  2+ dependent edema to the ankles. 1+ edema in the shins bilaterally.   right fifth toe ecchymosis  but sensation and movement intact, Unable to palpate pulses however dopperable and well perfused/warm and no discoloring/mottling.  Musculoskeletal: Moving all extremities with normal ROM, no joint swelling  Incision: MSI c/d/i    52 year old male, current every day marijuana use with a past medical hx of HTN, type A aortic dissection s/p Dacron grafts, AV resuspension in 2013,CAD s/p CABG x 1 SVG to RCA (5/2013 with Dr. 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 On 3/21 he underwent a Total Arch Replacement via Aorta to axillary cannulation. Pts ICU course was complicated by needing rounds of dialysis. Pt then began making his own urine and was started on bumex which was successfully diuresing him, Pt was discharged home on POD 20. Pt returned to the out patient office on 4/13 for concerns over ecchymosis over right fifth toe and SOB since discharge. Pt admits to not walking as much and only ambulating around the house but denies trauma to the area. Pt and wife also feel that his legs have become more swollen. pt has been making good urine since discharge and has no urinary issues.  Overnight he was started on IV diuretics. This morning became acutely agitated and locked himself in the bathroom. He was able to be verbally deescalated. Vascular called for ecchymosis of the right 5th toe. Appears to be a bruise and not necrosis or ischemia. As per Dr. Pierre patient is ready for discharge home. No changes have been made to his home medications and he will follow up at previously booked outpatient appointments.       GEN: AOx3 but appears confused and agitated   Neuro:   No focal deficits.  Moving all extremities.   HEENT: No obvious abnormalities  CV: S1S2, regular, no murmurs appreciated.  No carotid bruits.  No JVD  Lungs: Clear B/L.  No wheezing, rales or rhonchi  ABD: Soft, non-tender, non-distended.  +Bowel sounds  EXT: Warm/.  2+ dependent edema to the ankles. 1+ edema in the shins bilaterally.   right fifth toe ecchymosis  but sensation and movement intact, Unable to palpate pulses however dopperable and well perfused/warm and no discoloring/mottling.  Musculoskeletal: Moving all extremities with normal ROM, no joint swelling  Incision: MSI c/d/i ,,

## 2023-04-14 NOTE — CONSULT NOTE ADULT - ASSESSMENT
52 year old male current every day marijuana use with a past medical hx of HTN, type A aortic dissection s/p Dacron grafts, AV resuspension in 2013,CAD s/p CABG x 1 SVG to RCA (5/2013 with Dr. 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 Vascular surgery consulted for     No acute vascular surgery intervention indicated  Please follow up with Dr. Smyth in the office in 1 week  Continue ASA  If worsening symptoms or involvement of other toes, please obtain CTA and reconsult vascular surgery  Vascular surgery will sign off0                                                                                                                                                                                                                 52 year old male current every day marijuana use with a past medical hx of HTN, type A aortic dissection s/p Dacron grafts, AV resuspension in 2013,CAD s/p CABG x 1 SVG to RCA (5/2013 with Dr. 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 Vascular surgery consulted for concern for blue toe syndrome. On exam, patient has palpable PT and DP bilaterally, RLE notable for mild discoloration of dorsal and plantar surfaces of 4th and 5th toes. Labs reviewed.    No acute vascular surgery intervention indicated at this time.  Please follow up with Dr. Smyth in the office in 1 week. For appointments, please call [phone].  Continue ASA.  If symptoms worsen, recommend CTA and reconsult vascular surgery.  Vascular surgery will sign off, for questions please call [phone].

## 2023-04-14 NOTE — DIETITIAN INITIAL EVALUATION ADULT - NUTRITIONGOAL OUTCOME1
No further s/sx of malnutrition during admission   Consistently meet >75% of estimated needs during admission

## 2023-04-14 NOTE — DISCHARGE NOTE NURSING/CASE MANAGEMENT/SOCIAL WORK - PATIENT PORTAL LINK FT
You can access the FollowMyHealth Patient Portal offered by [facility] by registering at the following website: [email or web address]. By joining [facility]’s FollowMyHealth portal, you will also be able to view your health information using other applications (apps) compatible with our system.

## 2023-04-14 NOTE — DIETITIAN INITIAL EVALUATION ADULT - OTHER CALCULATIONS
Ht: 6’0”, CBW: 63.5kg/140lb, IBW 80.9kg/178lb +/-10%, %IBW 79%, BMI. IBW used to calculate estimated needs based on Standards of Care given pt < % IBW. Adjusted for malnutrition. **Fluids per team.

## 2023-04-14 NOTE — DIETITIAN INITIAL EVALUATION ADULT - ADD RECOMMEND
1. Recommend considering liberalizing CSTCHO/Renal restriction, continue Low Sodium diet  2. Add Ensure Enlive ONS TID (1050kcal,60gm pro)  3. Monitor PO intake  >> Consistently meet >75% of estimated needs during admission   4. Monitor chemistry, wt trends, GI function, and skin integrity   5. Encourage pt to meet nutritional needs as able   6. Honor food preferences as able  7. RD to remain available for additional nutrition interventions and diet edu as needed  **High Nutrition Risk   **Communicated to team

## 2023-04-14 NOTE — DIETITIAN NUTRITION RISK NOTIFICATION - ADDITIONAL COMMENTS/DIETITIAN RECOMMENDATIONS
1. Recommend considering liberalizing CSTCHO/Renal restriction, continue Low Sodium diet  2. Add Ensure Enlive ONS TID (1050kcal,60gm pro)  3. Monitor PO intake  >> Consistently meet >75% of estimated needs during admission   4. Monitor chemistry, wt trends, GI function, and skin integrity   5. Encourage pt to meet nutritional needs as able   6. Honor food preferences as able  7. RD to remain available for additional nutrition interventions and diet edu as needed  **High Nutrition Risk

## 2023-04-14 NOTE — DISCHARGE NOTE PROVIDER - CARE PROVIDER_API CALL
Elvis Pierre (MD)  Surgery; Thoracic and Cardiac Surgery  [address] [place] [address]  Phone: [phone]  Fax: [phone]  Follow Up Time:

## 2023-04-14 NOTE — DIETITIAN INITIAL EVALUATION ADULT - PERTINENT LABORATORY DATA
04-14    137  |  100  |  24<H>  ----------------------------<  74  4.0   |  28  |  1.85<H>    Ca    8.1<L>      14 Apr 2023 06:09  Mg     1.7     04-14    TPro  5.7<L>  /  Alb  2.2<L>  /  TBili  0.3  /  DBili  x   /  AST  15  /  ALT  <5<L>  /  AlkPhos  86  04-14  A1C with Estimated Average Glucose Result: 5.3 % (04-13-23 @ 17:08)  A1C with Estimated Average Glucose Result: 4.4 % (03-09-23 @ 09:33)

## 2023-04-14 NOTE — DIETITIAN INITIAL EVALUATION ADULT - PERTINENT MEDS FT
MEDICATIONS  (STANDING):  acetaminophen     Tablet .. 650 milliGRAM(s) Oral every 6 hours  aspirin enteric coated 81 milliGRAM(s) Oral daily  atorvastatin 20 milliGRAM(s) Oral at bedtime  buMETAnide Injectable 2 milliGRAM(s) IV Push every 12 hours  collagenase Ointment 1 Application(s) Topical daily  divalproex  milliGRAM(s) Oral daily  divalproex  milliGRAM(s) Oral daily  heparin   Injectable 5000 Unit(s) SubCutaneous every 8 hours  lacosamide 100 milliGRAM(s) Oral two times a day  metoprolol succinate  milliGRAM(s) Oral daily  pantoprazole    Tablet 40 milliGRAM(s) Oral before breakfast  sodium chloride 0.9% lock flush 3 milliLiter(s) IV Push every 8 hours    MEDICATIONS  (PRN):

## 2023-04-14 NOTE — PROGRESS NOTE ADULT - SUBJECTIVE AND OBJECTIVE BOX
Patient discussed on morning rounds with Dr. Pierre     Operation / Date:     SUBJECTIVE ASSESSMENT:  53y Male         Vital Signs Last 24 Hrs  T(C): 37.1 (2023 08:56), Max: 37.2 (2023 16:04)  T(F): 98.8 (2023 08:56), Max: 98.9 (2023 16:04)  HR: 83 (2023 09:00) (70 - 91)  BP: 124/86 (2023 09:00) (116/72 - 144/87)  BP(mean): 99 (2023 09:00) (89 - 99)  RR: 16 (2023 09:00) (16 - 18)  SpO2: 100% (2023 09:00) (94% - 100%)    Parameters below as of 2023 09:00  Patient On (Oxygen Delivery Method): room air      I&O's Detail    2023 07:01  -  2023 07:00  --------------------------------------------------------  IN:    Oral Fluid: 220 mL  Total IN: 220 mL    OUT:    Voided (mL): 900 mL  Total OUT: 900 mL    Total NET: -680 mL      2023 07:01  -  2023 12:46  --------------------------------------------------------  IN:    Oral Fluid: 400 mL  Total IN: 400 mL    OUT:    Voided (mL): 100 mL  Total OUT: 100 mL    Total NET: 300 mL          CHEST TUBE:  Yes/No. AIR LEAKS: Yes/No. Suction / H2O SEAL.   TIA DRAIN:  Yes/No.  EPICARDIAL WIRES: Yes/No.  TIE DOWNS: Yes/No.  JONES: Yes/No.    PHYSICAL EXAM:    General:     Neurological:    Cardiovascular:    Respiratory:    Gastrointestinal:    Extremities:    Vascular:    Incision Sites:    LABS:                        7.5    11.26 )-----------( 260      ( 2023 06:09 )             23.6       COUMADIN:  Yes/No. REASON: .    PT/INR - ( 2023 17:08 )   PT: 15.5 sec;   INR: 1.30          PTT - ( 2023 17:08 )  PTT:33.1 sec        137  |  100  |  24<H>  ----------------------------<  74  4.0   |  28  |  1.85<H>    Ca    8.1<L>      2023 06:09  Mg     1.7         TPro  5.7<L>  /  Alb  2.2<L>  /  TBili  0.3  /  DBili  x   /  AST  15  /  ALT  <5<L>  /  AlkPhos  86        Urinalysis Basic - ( 2023 00:55 )    Color: Yellow / Appearance: Clear / S.020 / pH: x  Gluc: x / Ketone: NEGATIVE  / Bili: Negative / Urobili: 0.2 E.U./dL   Blood: x / Protein: Trace mg/dL / Nitrite: NEGATIVE   Leuk Esterase: Small / RBC: < 5 /HPF / WBC 5-10 /HPF   Sq Epi: x / Non Sq Epi: x / Bacteria: Present /HPF        MEDICATIONS  (STANDING):  acetaminophen     Tablet .. 650 milliGRAM(s) Oral every 6 hours  aspirin enteric coated 81 milliGRAM(s) Oral daily  atorvastatin 20 milliGRAM(s) Oral at bedtime  buMETAnide Injectable 2 milliGRAM(s) IV Push every 12 hours  collagenase Ointment 1 Application(s) Topical daily  divalproex  milliGRAM(s) Oral daily  divalproex  milliGRAM(s) Oral daily  heparin   Injectable 5000 Unit(s) SubCutaneous every 8 hours  lacosamide 100 milliGRAM(s) Oral two times a day  metoprolol succinate  milliGRAM(s) Oral daily  pantoprazole    Tablet 40 milliGRAM(s) Oral before breakfast  sodium chloride 0.9% lock flush 3 milliLiter(s) IV Push every 8 hours    MEDICATIONS  (PRN):        RADIOLOGY & ADDITIONAL TESTS:     Patient discussed on morning rounds with Dr. Pierre     Operation / Date:     SUBJECTIVE ASSESSMENT:  53y Male         Vital Signs Last 24 Hrs  T(C): 37.1 (2023 08:56), Max: 37.2 (2023 16:04)  T(F): 98.8 (2023 08:56), Max: 98.9 (2023 16:04)  HR: 83 (2023 09:00) (70 - 91)  BP: 124/86 (2023 09:00) (116/72 - 144/87)  BP(mean): 99 (2023 09:00) (89 - 99)  RR: 16 (2023 09:00) (16 - 18)  SpO2: 100% (2023 09:00) (94% - 100%)    Parameters below as of 2023 09:00  Patient On (Oxygen Delivery Method): room air      I&O's Detail    2023 07:01  -  2023 07:00  --------------------------------------------------------  IN:    Oral Fluid: 220 mL  Total IN: 220 mL    OUT:    Voided (mL): 900 mL  Total OUT: 900 mL    Total NET: -680 mL      2023 07:01  -  2023 12:46  --------------------------------------------------------  IN:    Oral Fluid: 400 mL  Total IN: 400 mL    OUT:    Voided (mL): 100 mL  Total OUT: 100 mL    Total NET: 300 mL        GEN: AOx3 but appears confused and agitated   Neuro:   No focal deficits.  Moving all extremities.   HEENT: No obvious abnormalities  CV: S1S2, regular, no murmurs appreciated.  No carotid bruits.  No JVD  Lungs: Clear B/L.  No wheezing, rales or rhonchi  ABD: Soft, non-tender, non-distended.  +Bowel sounds  EXT: Warm/.  2+ dependent edema to the ankles. 1+ edema in the shins bilaterally.   right fifth toe ecchymosis  but sensation and movement intact, Unable to palpate pulses however dopperable and well perfused/warm and no discoloring/mottling.  Musculoskeletal: Moving all extremities with normal ROM, no joint swelling          LABS:                        7.5    11.26 )-----------( 260      ( 2023 06:09 )             23.6           PT/INR - ( 2023 17:08 )   PT: 15.5 sec;   INR: 1.30          PTT - ( 2023 17:08 )  PTT:33.1 sec        137  |  100  |  24<H>  ----------------------------<  74  4.0   |  28  |  1.85<H>    Ca    8.1<L>      2023 06:09  Mg     1.7         TPro  5.7<L>  /  Alb  2.2<L>  /  TBili  0.3  /  DBili  x   /  AST  15  /  ALT  <5<L>  /  AlkPhos  86        Urinalysis Basic - ( 2023 00:55 )    Color: Yellow / Appearance: Clear / S.020 / pH: x  Gluc: x / Ketone: NEGATIVE  / Bili: Negative / Urobili: 0.2 E.U./dL   Blood: x / Protein: Trace mg/dL / Nitrite: NEGATIVE   Leuk Esterase: Small / RBC: < 5 /HPF / WBC 5-10 /HPF   Sq Epi: x / Non Sq Epi: x / Bacteria: Present /HPF        MEDICATIONS  (STANDING):  acetaminophen     Tablet .. 650 milliGRAM(s) Oral every 6 hours  aspirin enteric coated 81 milliGRAM(s) Oral daily  atorvastatin 20 milliGRAM(s) Oral at bedtime  buMETAnide Injectable 2 milliGRAM(s) IV Push every 12 hours  collagenase Ointment 1 Application(s) Topical daily  divalproex  milliGRAM(s) Oral daily  divalproex  milliGRAM(s) Oral daily  heparin   Injectable 5000 Unit(s) SubCutaneous every 8 hours  lacosamide 100 milliGRAM(s) Oral two times a day  metoprolol succinate  milliGRAM(s) Oral daily  pantoprazole    Tablet 40 milliGRAM(s) Oral before breakfast  sodium chloride 0.9% lock flush 3 milliLiter(s) IV Push every 8 hours    MEDICATIONS  (PRN):        RADIOLOGY & ADDITIONAL TESTS:     Patient discussed on morning rounds with Dr. Pierre     Operation / Date: 3/21: Total Arch replacement     SUBJECTIVE ASSESSMENT:  53y Male locked himself in the bathroom for 45 minutes this morning complaining that he feels trapped and coerced into being here. States that he feels fine and just wants to go home.         Vital Signs Last 24 Hrs  T(C): 37.1 (2023 08:56), Max: 37.2 (2023 16:04)  T(F): 98.8 (2023 08:56), Max: 98.9 (2023 16:04)  HR: 83 (2023 09:00) (70 - 91)  BP: 124/86 (2023 09:00) (116/72 - 144/87)  BP(mean): 99 (2023 09:00) (89 - 99)  RR: 16 (2023 09:00) (16 - 18)  SpO2: 100% (2023 09:00) (94% - 100%)    Parameters below as of 2023 09:00  Patient On (Oxygen Delivery Method): room air      I&O's Detail    2023 07:01  -  2023 07:00  --------------------------------------------------------  IN:    Oral Fluid: 220 mL  Total IN: 220 mL    OUT:    Voided (mL): 900 mL  Total OUT: 900 mL    Total NET: -680 mL      2023 07:01  -  2023 12:46  --------------------------------------------------------  IN:    Oral Fluid: 400 mL  Total IN: 400 mL    OUT:    Voided (mL): 100 mL  Total OUT: 100 mL    Total NET: 300 mL        GEN: AOx3 but appears confused and agitated   Neuro:   No focal deficits.  Moving all extremities.   HEENT: No obvious abnormalities  CV: S1S2, regular, no murmurs appreciated.  No carotid bruits.  No JVD  Lungs: Clear B/L.  No wheezing, rales or rhonchi  ABD: Soft, non-tender, non-distended.  +Bowel sounds  EXT: Warm/.  2+ dependent edema to the ankles. 1+ edema in the shins bilaterally.   right fifth toe ecchymosis  but sensation and movement intact, Unable to palpate pulses however dopperable and well perfused/warm and no discoloring/mottling.  Musculoskeletal: Moving all extremities with normal ROM, no joint swelling  Incision: MSI c/d/i       LABS:                        7.5    11.26 )-----------( 260      ( 2023 06:09 )             23.6           PT/INR - ( 2023 17:08 )   PT: 15.5 sec;   INR: 1.30          PTT - ( 2023 17:08 )  PTT:33.1 sec        137  |  100  |  24<H>  ----------------------------<  74  4.0   |  28  |  1.85<H>    Ca    8.1<L>      2023 06:09  Mg     1.7         TPro  5.7<L>  /  Alb  2.2<L>  /  TBili  0.3  /  DBili  x   /  AST  15  /  ALT  <5<L>  /  AlkPhos  86  -14      Urinalysis Basic - ( 2023 00:55 )    Color: Yellow / Appearance: Clear / S.020 / pH: x  Gluc: x / Ketone: NEGATIVE  / Bili: Negative / Urobili: 0.2 E.U./dL   Blood: x / Protein: Trace mg/dL / Nitrite: NEGATIVE   Leuk Esterase: Small / RBC: < 5 /HPF / WBC 5-10 /HPF   Sq Epi: x / Non Sq Epi: x / Bacteria: Present /HPF        MEDICATIONS  (STANDING):  acetaminophen     Tablet .. 650 milliGRAM(s) Oral every 6 hours  aspirin enteric coated 81 milliGRAM(s) Oral daily  atorvastatin 20 milliGRAM(s) Oral at bedtime  buMETAnide Injectable 2 milliGRAM(s) IV Push every 12 hours  collagenase Ointment 1 Application(s) Topical daily  divalproex  milliGRAM(s) Oral daily  divalproex  milliGRAM(s) Oral daily  heparin   Injectable 5000 Unit(s) SubCutaneous every 8 hours  lacosamide 100 milliGRAM(s) Oral two times a day  metoprolol succinate  milliGRAM(s) Oral daily  pantoprazole    Tablet 40 milliGRAM(s) Oral before breakfast  sodium chloride 0.9% lock flush 3 milliLiter(s) IV Push every 8 hours    MEDICATIONS  (PRN):        RADIOLOGY & ADDITIONAL TESTS:    [facility]: Xray Chest 1 View- PORTABLE-Routine (23 @ 05:09) >  COMPARISON: 2023    Findings/  impression: Right pleural effusion and left basilar opacity/pleural   effusion, unchanged. Stable cardiomegaly, status post median sternotomy,   aortic valve replacement, aortic dilatation, aortic endovascular stent.   Left axillary surgical clips.    --- End of Report ---    < end of copied text >

## 2023-04-16 PROCEDURE — 94660 CPAP INITIATION&MGMT: CPT

## 2023-04-16 PROCEDURE — C1889: CPT

## 2023-04-16 PROCEDURE — 86965 POOLING BLOOD PLATELETS: CPT

## 2023-04-16 PROCEDURE — 85014 HEMATOCRIT: CPT

## 2023-04-16 PROCEDURE — P9037: CPT

## 2023-04-16 PROCEDURE — C1751: CPT

## 2023-04-16 PROCEDURE — 81001 URINALYSIS AUTO W/SCOPE: CPT

## 2023-04-16 PROCEDURE — P9045: CPT

## 2023-04-16 PROCEDURE — 82553 CREATINE MB FRACTION: CPT

## 2023-04-16 PROCEDURE — 86850 RBC ANTIBODY SCREEN: CPT

## 2023-04-16 PROCEDURE — 83615 LACTATE (LD) (LDH) ENZYME: CPT

## 2023-04-16 PROCEDURE — 82962 GLUCOSE BLOOD TEST: CPT

## 2023-04-16 PROCEDURE — 97530 THERAPEUTIC ACTIVITIES: CPT

## 2023-04-16 PROCEDURE — 92610 EVALUATE SWALLOWING FUNCTION: CPT

## 2023-04-16 PROCEDURE — 86901 BLOOD TYPING SEROLOGIC RH(D): CPT

## 2023-04-16 PROCEDURE — 80164 ASSAY DIPROPYLACETIC ACD TOT: CPT

## 2023-04-16 PROCEDURE — 84484 ASSAY OF TROPONIN QUANT: CPT

## 2023-04-16 PROCEDURE — C1769: CPT

## 2023-04-16 PROCEDURE — 97116 GAIT TRAINING THERAPY: CPT

## 2023-04-16 PROCEDURE — 93306 TTE W/DOPPLER COMPLETE: CPT

## 2023-04-16 PROCEDURE — 84145 PROCALCITONIN (PCT): CPT

## 2023-04-16 PROCEDURE — 84132 ASSAY OF SERUM POTASSIUM: CPT

## 2023-04-16 PROCEDURE — 85025 COMPLETE CBC W/AUTO DIFF WBC: CPT

## 2023-04-16 PROCEDURE — 85730 THROMBOPLASTIN TIME PARTIAL: CPT

## 2023-04-16 PROCEDURE — 86900 BLOOD TYPING SEROLOGIC ABO: CPT

## 2023-04-16 PROCEDURE — C1887: CPT

## 2023-04-16 PROCEDURE — 87075 CULTR BACTERIA EXCEPT BLOOD: CPT

## 2023-04-16 PROCEDURE — 94799 UNLISTED PULMONARY SVC/PX: CPT

## 2023-04-16 PROCEDURE — 85027 COMPLETE CBC AUTOMATED: CPT

## 2023-04-16 PROCEDURE — C9254: CPT

## 2023-04-16 PROCEDURE — 82330 ASSAY OF CALCIUM: CPT

## 2023-04-16 PROCEDURE — 93971 EXTREMITY STUDY: CPT

## 2023-04-16 PROCEDURE — 82533 TOTAL CORTISOL: CPT

## 2023-04-16 PROCEDURE — 86803 HEPATITIS C AB TEST: CPT

## 2023-04-16 PROCEDURE — P9047: CPT

## 2023-04-16 PROCEDURE — P9059: CPT

## 2023-04-16 PROCEDURE — 87070 CULTURE OTHR SPECIMN AEROBIC: CPT

## 2023-04-16 PROCEDURE — 95700 EEG CONT REC W/VID EEG TECH: CPT

## 2023-04-16 PROCEDURE — 94640 AIRWAY INHALATION TREATMENT: CPT

## 2023-04-16 PROCEDURE — 85610 PROTHROMBIN TIME: CPT

## 2023-04-16 PROCEDURE — P9016: CPT

## 2023-04-16 PROCEDURE — 80048 BASIC METABOLIC PNL TOTAL CA: CPT

## 2023-04-16 PROCEDURE — 88305 TISSUE EXAM BY PATHOLOGIST: CPT

## 2023-04-16 PROCEDURE — 82947 ASSAY GLUCOSE BLOOD QUANT: CPT

## 2023-04-16 PROCEDURE — 83735 ASSAY OF MAGNESIUM: CPT

## 2023-04-16 PROCEDURE — 84100 ASSAY OF PHOSPHORUS: CPT

## 2023-04-16 PROCEDURE — 92526 ORAL FUNCTION THERAPY: CPT

## 2023-04-16 PROCEDURE — 93005 ELECTROCARDIOGRAM TRACING: CPT

## 2023-04-16 PROCEDURE — 97162 PT EVAL MOD COMPLEX 30 MIN: CPT

## 2023-04-16 PROCEDURE — 86891 AUTOLOGOUS BLOOD OP SALVAGE: CPT

## 2023-04-16 PROCEDURE — 84295 ASSAY OF SERUM SODIUM: CPT

## 2023-04-16 PROCEDURE — 82550 ASSAY OF CK (CPK): CPT

## 2023-04-16 PROCEDURE — 87040 BLOOD CULTURE FOR BACTERIA: CPT

## 2023-04-16 PROCEDURE — 95708 EEG WO VID EA 12-26HR UNMNTR: CPT

## 2023-04-16 PROCEDURE — 90935 HEMODIALYSIS ONE EVALUATION: CPT

## 2023-04-16 PROCEDURE — 86022 PLATELET ANTIBODIES: CPT

## 2023-04-16 PROCEDURE — 87206 SMEAR FLUORESCENT/ACID STAI: CPT

## 2023-04-16 PROCEDURE — 86923 COMPATIBILITY TEST ELECTRIC: CPT

## 2023-04-16 PROCEDURE — 86706 HEP B SURFACE ANTIBODY: CPT

## 2023-04-16 PROCEDURE — 87186 SC STD MICRODIL/AGAR DIL: CPT

## 2023-04-16 PROCEDURE — 70450 CT HEAD/BRAIN W/O DYE: CPT

## 2023-04-16 PROCEDURE — 83605 ASSAY OF LACTIC ACID: CPT

## 2023-04-16 PROCEDURE — C1894: CPT

## 2023-04-16 PROCEDURE — 87116 MYCOBACTERIA CULTURE: CPT

## 2023-04-16 PROCEDURE — 93308 TTE F-UP OR LMTD: CPT

## 2023-04-16 PROCEDURE — 94003 VENT MGMT INPAT SUBQ DAY: CPT

## 2023-04-16 PROCEDURE — 36415 COLL VENOUS BLD VENIPUNCTURE: CPT

## 2023-04-16 PROCEDURE — 36430 TRANSFUSION BLD/BLD COMPNT: CPT

## 2023-04-16 PROCEDURE — C1768: CPT

## 2023-04-16 PROCEDURE — 82010 KETONE BODYS QUAN: CPT

## 2023-04-16 PROCEDURE — 97165 OT EVAL LOW COMPLEX 30 MIN: CPT

## 2023-04-16 PROCEDURE — P9012: CPT

## 2023-04-16 PROCEDURE — 94002 VENT MGMT INPAT INIT DAY: CPT

## 2023-04-16 PROCEDURE — 87102 FUNGUS ISOLATION CULTURE: CPT

## 2023-04-16 PROCEDURE — P9100: CPT

## 2023-04-16 PROCEDURE — 80053 COMPREHEN METABOLIC PANEL: CPT

## 2023-04-16 PROCEDURE — 82803 BLOOD GASES ANY COMBINATION: CPT

## 2023-04-16 PROCEDURE — 71045 X-RAY EXAM CHEST 1 VIEW: CPT

## 2023-04-16 NOTE — ASSESSMENT
[FreeTextEntry1] : 53 year old male s/p reop sternotomy, total arch replacement on 3/20/23, presents for postop visit. Patient with significant fluid overload and SOB; on Bumex 2mg q daily. \par \par -admit to ED for further management of fluid overload; IV diuresis, TTE to r/o tamponade.

## 2023-04-16 NOTE — REASON FOR VISIT
[Spouse] : spouse [de-identified] : 52 year old male, current every day marijuana use with a past medical hx of HTN, type A aortic dissection s/p Dacron grafts, AV resuspension in 2013,CAD s/p CABG x 1 SVG to RCA (5/2013 with Dr. 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 \par Intraoperatively patient received 7 PRBCs, 4 FFP, 3 platelets, 15 cryo and 1000 of FEIBA. He arrived to the ICU intubated on Epi and Levo. Overnight he had profound acidosis requiring inotropic support with Milrinone and Armaan and  CVVH. Initially woke and followed commands but later had seizures. Epilepsy was consulted and antiepileptic drug levels were send and doses were readjusted. POD#1 the acidosis was resolved and volume removal via cvvh was initiated. POD#2 patient was no longer following commands prompting a CT Head which confirmed no acute stroke. Hypoxia started to improve with volume removal. POD#3 the patient resumed following commands appropriately. POD#4 patient started to make good urine while on a bumex gtt. Although making urine, patient was still not clearing BUN and potassium so he had a session of intermittent HD. On 3/29 the patient was extubated to high flow. POD#8 a left sided pigtail was placed for worsening pleural effusion. He was also bronch'd and placed on BIPAP for poor respiratory effort and secretions. On POD#9 he had intermittent HD followed by aggressive volume removal with aquapheresis. POD#12 He was challenged with Bumex again with good response and thought to no longer require dialysis. POD#15 a right sided pigtail was placed for worsening pleural effusion. Both pigtails were removed without incident by POD#18. POD#19 patient was seen by physical therapy who recommended that the patient go to an acute rehab facility. The patient and his wife are refusing to go to any facility and are requesting to be discharged home. As per Dr. Pierre on POD#20 patient is medically ready for discharge home 4/10/23. He will go home on Bumex 2mg PO daily and will have close follow up with nephrology.\par \par Patient presents for a postop visit. Noted to have 3+ b/l peripheral edema. Reports worsening shortness of breath. Chest xray done without significant effusions.  \par Patient also reports discoloration to right pinky toe.

## 2023-04-16 NOTE — END OF VISIT
[FreeTextEntry3] : \par I, BEA GERMANU , am scribing for and in the presence of YOSHI HOLT the following sections: History of present illness, past Medical/family/surgical/family/social history, review of systems, vital signs, physical exam and disposition.\par \par I personally performed the services described in the documentation, reviewed the documentation recorded by the scribe in my presence and it accurately and completely records my words and actions.\par \par

## 2023-04-19 LAB
CULTURE RESULTS: SIGNIFICANT CHANGE UP
SPECIMEN SOURCE: SIGNIFICANT CHANGE UP

## 2023-04-26 ENCOUNTER — OUTPATIENT (OUTPATIENT)
Dept: OUTPATIENT SERVICES | Facility: HOSPITAL | Age: 53
LOS: 1 days | End: 2023-04-26
Payer: COMMERCIAL

## 2023-04-26 ENCOUNTER — APPOINTMENT (OUTPATIENT)
Dept: CARDIOTHORACIC SURGERY | Facility: CLINIC | Age: 53
End: 2023-04-26

## 2023-04-26 VITALS
OXYGEN SATURATION: 99 % | HEIGHT: 60 IN | HEART RATE: 80 BPM | TEMPERATURE: 99 F | BODY MASS INDEX: 34.95 KG/M2 | DIASTOLIC BLOOD PRESSURE: 85 MMHG | RESPIRATION RATE: 16 BRPM | WEIGHT: 178 LBS | SYSTOLIC BLOOD PRESSURE: 137 MMHG

## 2023-04-26 VITALS
SYSTOLIC BLOOD PRESSURE: 137 MMHG | HEART RATE: 80 BPM | DIASTOLIC BLOOD PRESSURE: 85 MMHG | OXYGEN SATURATION: 99 % | TEMPERATURE: 99 F | RESPIRATION RATE: 16 BRPM

## 2023-04-26 DIAGNOSIS — Z95.828 PRESENCE OF OTHER VASCULAR IMPLANTS AND GRAFTS: Chronic | ICD-10-CM

## 2023-04-26 DIAGNOSIS — Z95.1 PRESENCE OF AORTOCORONARY BYPASS GRAFT: Chronic | ICD-10-CM

## 2023-04-26 DIAGNOSIS — Z98.890 OTHER SPECIFIED POSTPROCEDURAL STATES: ICD-10-CM

## 2023-04-26 PROCEDURE — 71046 X-RAY EXAM CHEST 2 VIEWS: CPT

## 2023-04-26 PROCEDURE — 71046 X-RAY EXAM CHEST 2 VIEWS: CPT | Mod: 26

## 2023-04-26 RX ORDER — OXYCODONE 5 MG/1
5 TABLET ORAL
Refills: 0 | Status: DISCONTINUED | COMMUNITY
End: 2023-04-26

## 2023-04-26 RX ORDER — ATORVASTATIN CALCIUM 20 MG/1
20 TABLET, FILM COATED ORAL
Refills: 0 | Status: DISCONTINUED | COMMUNITY
End: 2023-04-26

## 2023-04-26 NOTE — DISCUSSION/SUMMARY
[Slow Progress] : is progressing slowly [Fair Pain Control] : has fair pain control [No Sign of Infection] : is showing no signs of infection

## 2023-04-27 ENCOUNTER — EMERGENCY (EMERGENCY)
Facility: HOSPITAL | Age: 53
LOS: 1 days | Discharge: ANOTHER HOSPITAL | End: 2023-04-27
Attending: EMERGENCY MEDICINE | Admitting: EMERGENCY MEDICINE
Payer: COMMERCIAL

## 2023-04-27 ENCOUNTER — INPATIENT (INPATIENT)
Facility: HOSPITAL | Age: 53
LOS: 2 days | Discharge: HOME HEALTH | DRG: 312 | End: 2023-04-30
Attending: THORACIC SURGERY (CARDIOTHORACIC VASCULAR SURGERY) | Admitting: THORACIC SURGERY (CARDIOTHORACIC VASCULAR SURGERY)
Payer: COMMERCIAL

## 2023-04-27 VITALS
OXYGEN SATURATION: 100 % | SYSTOLIC BLOOD PRESSURE: 113 MMHG | HEART RATE: 72 BPM | DIASTOLIC BLOOD PRESSURE: 70 MMHG | RESPIRATION RATE: 18 BRPM

## 2023-04-27 VITALS
OXYGEN SATURATION: 100 % | TEMPERATURE: 98 F | DIASTOLIC BLOOD PRESSURE: 79 MMHG | HEART RATE: 63 BPM | SYSTOLIC BLOOD PRESSURE: 158 MMHG | RESPIRATION RATE: 17 BRPM

## 2023-04-27 VITALS
RESPIRATION RATE: 15 BRPM | TEMPERATURE: 98 F | OXYGEN SATURATION: 99 % | SYSTOLIC BLOOD PRESSURE: 103 MMHG | HEART RATE: 68 BPM | DIASTOLIC BLOOD PRESSURE: 68 MMHG

## 2023-04-27 DIAGNOSIS — Z95.828 PRESENCE OF OTHER VASCULAR IMPLANTS AND GRAFTS: Chronic | ICD-10-CM

## 2023-04-27 DIAGNOSIS — Z95.1 PRESENCE OF AORTOCORONARY BYPASS GRAFT: Chronic | ICD-10-CM

## 2023-04-27 LAB
ALBUMIN SERPL ELPH-MCNC: 2.4 G/DL — LOW (ref 3.3–5)
ALBUMIN SERPL ELPH-MCNC: 2.8 G/DL — LOW (ref 3.3–5)
ALP SERPL-CCNC: 73 U/L — SIGNIFICANT CHANGE UP (ref 40–120)
ALP SERPL-CCNC: 85 U/L — SIGNIFICANT CHANGE UP (ref 40–120)
ALT FLD-CCNC: 5 U/L — LOW (ref 10–45)
ALT FLD-CCNC: 9 U/L — SIGNIFICANT CHANGE UP (ref 4–41)
AMMONIA BLD-MCNC: 11 UMOL/L — SIGNIFICANT CHANGE UP (ref 11–55)
AMPHET UR-MCNC: NEGATIVE — SIGNIFICANT CHANGE UP
ANION GAP SERPL CALC-SCNC: 15 MMOL/L — HIGH (ref 7–14)
ANION GAP SERPL CALC-SCNC: 8 MMOL/L — SIGNIFICANT CHANGE UP (ref 5–17)
ANISOCYTOSIS BLD QL: SLIGHT — SIGNIFICANT CHANGE UP
APAP SERPL-MCNC: <10 UG/ML — LOW (ref 15–25)
APPEARANCE UR: CLEAR — SIGNIFICANT CHANGE UP
APPEARANCE UR: CLEAR — SIGNIFICANT CHANGE UP
APTT BLD: 29.7 SEC — SIGNIFICANT CHANGE UP (ref 27.5–35.5)
APTT BLD: 31.2 SEC — SIGNIFICANT CHANGE UP (ref 27–36.3)
AST SERPL-CCNC: 10 U/L — SIGNIFICANT CHANGE UP (ref 10–40)
AST SERPL-CCNC: 24 U/L — SIGNIFICANT CHANGE UP (ref 4–40)
BACTERIA # UR AUTO: NEGATIVE — SIGNIFICANT CHANGE UP
BACTERIA # UR AUTO: PRESENT /HPF
BARBITURATES UR SCN-MCNC: NEGATIVE — SIGNIFICANT CHANGE UP
BASE EXCESS BLDV CALC-SCNC: 8.4 MMOL/L — HIGH (ref -2–3)
BASOPHILS # BLD AUTO: 0 K/UL — SIGNIFICANT CHANGE UP (ref 0–0.2)
BASOPHILS NFR BLD AUTO: 0 % — SIGNIFICANT CHANGE UP (ref 0–2)
BENZODIAZ UR-MCNC: NEGATIVE — SIGNIFICANT CHANGE UP
BILIRUB SERPL-MCNC: 0.2 MG/DL — SIGNIFICANT CHANGE UP (ref 0.2–1.2)
BILIRUB SERPL-MCNC: 0.3 MG/DL — SIGNIFICANT CHANGE UP (ref 0.2–1.2)
BILIRUB UR-MCNC: NEGATIVE — SIGNIFICANT CHANGE UP
BILIRUB UR-MCNC: NEGATIVE — SIGNIFICANT CHANGE UP
BLD GP AB SCN SERPL QL: NEGATIVE — SIGNIFICANT CHANGE UP
BLD GP AB SCN SERPL QL: NEGATIVE — SIGNIFICANT CHANGE UP
BLOOD GAS VENOUS COMPREHENSIVE RESULT: SIGNIFICANT CHANGE UP
BUN SERPL-MCNC: 27 MG/DL — HIGH (ref 7–23)
BUN SERPL-MCNC: 28 MG/DL — HIGH (ref 7–23)
CALCIUM SERPL-MCNC: 7.3 MG/DL — LOW (ref 8.4–10.5)
CALCIUM SERPL-MCNC: 9.1 MG/DL — SIGNIFICANT CHANGE UP (ref 8.4–10.5)
CHLORIDE BLDV-SCNC: 96 MMOL/L — SIGNIFICANT CHANGE UP (ref 96–108)
CHLORIDE SERPL-SCNC: 93 MMOL/L — LOW (ref 98–107)
CHLORIDE SERPL-SCNC: 95 MMOL/L — LOW (ref 96–108)
CK MB CFR SERPL CALC: 1.6 NG/ML — SIGNIFICANT CHANGE UP
CK SERPL-CCNC: 42 U/L — SIGNIFICANT CHANGE UP (ref 30–200)
CO2 BLDV-SCNC: 36.5 MMOL/L — HIGH (ref 22–26)
CO2 SERPL-SCNC: 27 MMOL/L — SIGNIFICANT CHANGE UP (ref 22–31)
CO2 SERPL-SCNC: 29 MMOL/L — SIGNIFICANT CHANGE UP (ref 22–31)
COCAINE METAB.OTHER UR-MCNC: NEGATIVE — SIGNIFICANT CHANGE UP
COLOR SPEC: YELLOW — SIGNIFICANT CHANGE UP
COLOR SPEC: YELLOW — SIGNIFICANT CHANGE UP
COMMENT - URINE: SIGNIFICANT CHANGE UP
CREAT ?TM UR-MCNC: 139 MG/DL — SIGNIFICANT CHANGE UP
CREAT SERPL-MCNC: 2.33 MG/DL — HIGH (ref 0.5–1.3)
CREAT SERPL-MCNC: 2.86 MG/DL — HIGH (ref 0.5–1.3)
CREATININE URINE RESULT, DAU: 161 MG/DL — SIGNIFICANT CHANGE UP
DIFF PNL FLD: ABNORMAL
DIFF PNL FLD: NEGATIVE — SIGNIFICANT CHANGE UP
EGFR: 25 ML/MIN/1.73M2 — LOW
EGFR: 32 ML/MIN/1.73M2 — LOW
EOSINOPHIL # BLD AUTO: 0 K/UL — SIGNIFICANT CHANGE UP (ref 0–0.5)
EOSINOPHIL NFR BLD AUTO: 0 % — SIGNIFICANT CHANGE UP (ref 0–6)
EPI CELLS # UR: 6 /HPF — HIGH (ref 0–5)
EPI CELLS # UR: SIGNIFICANT CHANGE UP /HPF (ref 0–5)
ETHANOL SERPL-MCNC: <10 MG/DL — SIGNIFICANT CHANGE UP
GAS PNL BLDV: 133 MMOL/L — LOW (ref 136–145)
GIANT PLATELETS BLD QL SMEAR: PRESENT — SIGNIFICANT CHANGE UP
GLUCOSE BLDC GLUCOMTR-MCNC: 82 MG/DL — SIGNIFICANT CHANGE UP (ref 70–99)
GLUCOSE BLDC GLUCOMTR-MCNC: 87 MG/DL — SIGNIFICANT CHANGE UP (ref 70–99)
GLUCOSE BLDV-MCNC: 119 MG/DL — HIGH (ref 70–99)
GLUCOSE SERPL-MCNC: 111 MG/DL — HIGH (ref 70–99)
GLUCOSE SERPL-MCNC: 79 MG/DL — SIGNIFICANT CHANGE UP (ref 70–99)
GLUCOSE UR QL: NEGATIVE — SIGNIFICANT CHANGE UP
GLUCOSE UR QL: NEGATIVE — SIGNIFICANT CHANGE UP
GRAN CASTS # UR COMP ASSIST: 2 /LPF — HIGH
GRAN CASTS # UR COMP ASSIST: ABNORMAL /LPF
HCO3 BLDV-SCNC: 35 MMOL/L — HIGH (ref 22–29)
HCT VFR BLD CALC: 19.5 % — CRITICAL LOW (ref 39–50)
HCT VFR BLD CALC: 23.7 % — LOW (ref 39–50)
HCT VFR BLD CALC: 26.3 % — LOW (ref 39–50)
HCT VFR BLDA CALC: 22 % — LOW (ref 39–51)
HGB BLD CALC-MCNC: 7.3 G/DL — LOW (ref 12.6–17.4)
HGB BLD-MCNC: 6 G/DL — CRITICAL LOW (ref 13–17)
HGB BLD-MCNC: 7.5 G/DL — LOW (ref 13–17)
HGB BLD-MCNC: 8.1 G/DL — LOW (ref 13–17)
HYALINE CASTS # UR AUTO: 18 /LPF — HIGH (ref 0–7)
HYALINE CASTS # UR AUTO: SIGNIFICANT CHANGE UP /LPF (ref 0–2)
HYPOCHROMIA BLD QL: SIGNIFICANT CHANGE UP
IANC: 10.2 K/UL — HIGH (ref 1.8–7.4)
INR BLD: 1.23 — HIGH (ref 0.88–1.16)
INR BLD: 1.26 RATIO — HIGH (ref 0.88–1.16)
KETONES UR-MCNC: NEGATIVE — SIGNIFICANT CHANGE UP
KETONES UR-MCNC: NEGATIVE — SIGNIFICANT CHANGE UP
LACTATE BLDV-MCNC: 2.1 MMOL/L — HIGH (ref 0.5–2)
LACTATE SERPL-SCNC: 0.8 MMOL/L — SIGNIFICANT CHANGE UP (ref 0.5–2)
LEUKOCYTE ESTERASE UR-ACNC: NEGATIVE — SIGNIFICANT CHANGE UP
LEUKOCYTE ESTERASE UR-ACNC: NEGATIVE — SIGNIFICANT CHANGE UP
LYMPHOCYTES # BLD AUTO: 0.99 K/UL — LOW (ref 1–3.3)
LYMPHOCYTES # BLD AUTO: 7.2 % — LOW (ref 13–44)
MACROCYTES BLD QL: SLIGHT — SIGNIFICANT CHANGE UP
MAGNESIUM SERPL-MCNC: 1.8 MG/DL — SIGNIFICANT CHANGE UP (ref 1.6–2.6)
MANUAL SMEAR VERIFICATION: SIGNIFICANT CHANGE UP
MCHC RBC-ENTMCNC: 29.2 PG — SIGNIFICANT CHANGE UP (ref 27–34)
MCHC RBC-ENTMCNC: 29.7 PG — SIGNIFICANT CHANGE UP (ref 27–34)
MCHC RBC-ENTMCNC: 29.9 PG — SIGNIFICANT CHANGE UP (ref 27–34)
MCHC RBC-ENTMCNC: 30.8 GM/DL — LOW (ref 32–36)
MCHC RBC-ENTMCNC: 30.8 GM/DL — LOW (ref 32–36)
MCHC RBC-ENTMCNC: 31.6 GM/DL — LOW (ref 32–36)
MCV RBC AUTO: 92.2 FL — SIGNIFICANT CHANGE UP (ref 80–100)
MCV RBC AUTO: 96.3 FL — SIGNIFICANT CHANGE UP (ref 80–100)
MCV RBC AUTO: 97 FL — SIGNIFICANT CHANGE UP (ref 80–100)
METAMYELOCYTES # FLD: 0.9 % — SIGNIFICANT CHANGE UP (ref 0–1)
METHADONE UR-MCNC: NEGATIVE — SIGNIFICANT CHANGE UP
MONOCYTES # BLD AUTO: 1.1 K/UL — HIGH (ref 0–0.9)
MONOCYTES NFR BLD AUTO: 8 % — SIGNIFICANT CHANGE UP (ref 2–14)
NEUTROPHILS # BLD AUTO: 11.44 K/UL — HIGH (ref 1.8–7.4)
NEUTROPHILS NFR BLD AUTO: 83 % — HIGH (ref 43–77)
NITRITE UR-MCNC: NEGATIVE — SIGNIFICANT CHANGE UP
NITRITE UR-MCNC: NEGATIVE — SIGNIFICANT CHANGE UP
NRBC # BLD: 0 /100 WBCS — SIGNIFICANT CHANGE UP (ref 0–0)
NRBC # BLD: 0 /100 WBCS — SIGNIFICANT CHANGE UP (ref 0–0)
OPIATES UR-MCNC: NEGATIVE — SIGNIFICANT CHANGE UP
OXYCODONE UR-MCNC: POSITIVE
PCO2 BLDV: 60 MMHG — HIGH (ref 42–55)
PCP SPEC-MCNC: SIGNIFICANT CHANGE UP
PCP UR-MCNC: NEGATIVE — SIGNIFICANT CHANGE UP
PH BLDV: 7.37 — SIGNIFICANT CHANGE UP (ref 7.32–7.43)
PH UR: 6.5 — SIGNIFICANT CHANGE UP (ref 5–8)
PH UR: 6.5 — SIGNIFICANT CHANGE UP (ref 5–8)
PHENYTOIN FREE SERPL-MCNC: <0.8 UG/ML — LOW (ref 10–20)
PHOSPHATE SERPL-MCNC: 5.1 MG/DL — HIGH (ref 2.5–4.5)
PLAT MORPH BLD: NORMAL — SIGNIFICANT CHANGE UP
PLATELET # BLD AUTO: 286 K/UL — SIGNIFICANT CHANGE UP (ref 150–400)
PLATELET # BLD AUTO: 296 K/UL — SIGNIFICANT CHANGE UP (ref 150–400)
PLATELET # BLD AUTO: 315 K/UL — SIGNIFICANT CHANGE UP (ref 150–400)
PLATELET COUNT - ESTIMATE: NORMAL — SIGNIFICANT CHANGE UP
PO2 BLDV: 32 MMHG — SIGNIFICANT CHANGE UP (ref 25–45)
POIKILOCYTOSIS BLD QL AUTO: SLIGHT — SIGNIFICANT CHANGE UP
POLYCHROMASIA BLD QL SMEAR: SLIGHT — SIGNIFICANT CHANGE UP
POTASSIUM BLDV-SCNC: 5.7 MMOL/L — HIGH (ref 3.5–5.1)
POTASSIUM SERPL-MCNC: 4.2 MMOL/L — SIGNIFICANT CHANGE UP (ref 3.5–5.3)
POTASSIUM SERPL-MCNC: 5.6 MMOL/L — HIGH (ref 3.5–5.3)
POTASSIUM SERPL-SCNC: 4.2 MMOL/L — SIGNIFICANT CHANGE UP (ref 3.5–5.3)
POTASSIUM SERPL-SCNC: 5.6 MMOL/L — HIGH (ref 3.5–5.3)
PROCALCITONIN SERPL-MCNC: 0.74 NG/ML — HIGH (ref 0.02–0.1)
PROLACTIN SERPL-MCNC: 13.43 NG/ML — SIGNIFICANT CHANGE UP (ref 3.4–24.1)
PROT SERPL-MCNC: 5.9 G/DL — LOW (ref 6–8.3)
PROT SERPL-MCNC: 7.5 G/DL — SIGNIFICANT CHANGE UP (ref 6–8.3)
PROT UR-MCNC: 100 MG/DL
PROT UR-MCNC: ABNORMAL
PROTHROM AB SERPL-ACNC: 14.7 SEC — HIGH (ref 10.5–13.4)
PROTHROM AB SERPL-ACNC: 14.7 SEC — HIGH (ref 10.5–13.4)
RBC # BLD: 2.01 M/UL — LOW (ref 4.2–5.8)
RBC # BLD: 2.57 M/UL — LOW (ref 4.2–5.8)
RBC # BLD: 2.73 M/UL — LOW (ref 4.2–5.8)
RBC # FLD: 14.5 % — SIGNIFICANT CHANGE UP (ref 10.3–14.5)
RBC BLD AUTO: ABNORMAL
RBC CASTS # UR COMP ASSIST: 6 /HPF — HIGH (ref 0–4)
RBC CASTS # UR COMP ASSIST: < 5 /HPF — SIGNIFICANT CHANGE UP
RH IG SCN BLD-IMP: POSITIVE — SIGNIFICANT CHANGE UP
SALICYLATES SERPL-MCNC: <0.3 MG/DL — LOW (ref 15–30)
SAO2 % BLDV: 43.3 % — LOW (ref 67–88)
SMUDGE CELLS # BLD: PRESENT — SIGNIFICANT CHANGE UP
SODIUM SERPL-SCNC: 132 MMOL/L — LOW (ref 135–145)
SODIUM SERPL-SCNC: 135 MMOL/L — SIGNIFICANT CHANGE UP (ref 135–145)
SODIUM UR-SCNC: <20 MMOL/L — SIGNIFICANT CHANGE UP
SP GR SPEC: 1.02 — SIGNIFICANT CHANGE UP (ref 1–1.03)
SP GR SPEC: >1.05 (ref 1.01–1.05)
THC UR QL: POSITIVE
TOXICOLOGY SCREEN, DRUGS OF ABUSE, SERUM RESULT: SIGNIFICANT CHANGE UP
TROPONIN T, HIGH SENSITIVITY RESULT: 65 NG/L — CRITICAL HIGH
UROBILINOGEN FLD QL: 0.2 E.U./DL — SIGNIFICANT CHANGE UP
UROBILINOGEN FLD QL: SIGNIFICANT CHANGE UP
VALPROATE SERPL-MCNC: 40.7 UG/ML — LOW (ref 50–100)
VALPROATE SERPL-MCNC: 65.7 UG/ML — SIGNIFICANT CHANGE UP (ref 50–100)
VARIANT LYMPHS # BLD: 0.9 % — SIGNIFICANT CHANGE UP (ref 0–6)
WBC # BLD: 11.26 K/UL — HIGH (ref 3.8–10.5)
WBC # BLD: 13.78 K/UL — HIGH (ref 3.8–10.5)
WBC # BLD: 8.49 K/UL — SIGNIFICANT CHANGE UP (ref 3.8–10.5)
WBC # FLD AUTO: 11.26 K/UL — HIGH (ref 3.8–10.5)
WBC # FLD AUTO: 13.78 K/UL — HIGH (ref 3.8–10.5)
WBC # FLD AUTO: 8.49 K/UL — SIGNIFICANT CHANGE UP (ref 3.8–10.5)
WBC UR QL: 9 /HPF — HIGH (ref 0–5)
WBC UR QL: ABNORMAL /HPF

## 2023-04-27 PROCEDURE — 71275 CT ANGIOGRAPHY CHEST: CPT | Mod: 26,MA

## 2023-04-27 PROCEDURE — 93010 ELECTROCARDIOGRAM REPORT: CPT

## 2023-04-27 PROCEDURE — 99291 CRITICAL CARE FIRST HOUR: CPT

## 2023-04-27 PROCEDURE — 71045 X-RAY EXAM CHEST 1 VIEW: CPT | Mod: 26

## 2023-04-27 PROCEDURE — 70498 CT ANGIOGRAPHY NECK: CPT | Mod: 26,MA

## 2023-04-27 PROCEDURE — 99292 CRITICAL CARE ADDL 30 MIN: CPT

## 2023-04-27 PROCEDURE — 74174 CTA ABD&PLVS W/CONTRAST: CPT | Mod: 26,MA

## 2023-04-27 PROCEDURE — 70496 CT ANGIOGRAPHY HEAD: CPT | Mod: 26,MA

## 2023-04-27 RX ORDER — ESMOLOL HCL 100MG/10ML
50 VIAL (ML) INTRAVENOUS
Qty: 2500 | Refills: 0 | Status: DISCONTINUED | OUTPATIENT
Start: 2023-04-27 | End: 2023-04-30

## 2023-04-27 RX ORDER — VALPROIC ACID (AS SODIUM SALT) 250 MG/5ML
1000 SOLUTION, ORAL ORAL EVERY 12 HOURS
Refills: 0 | Status: DISCONTINUED | OUTPATIENT
Start: 2023-04-27 | End: 2023-04-30

## 2023-04-27 RX ORDER — ATORVASTATIN CALCIUM 80 MG/1
20 TABLET, FILM COATED ORAL AT BEDTIME
Refills: 0 | Status: DISCONTINUED | OUTPATIENT
Start: 2023-04-27 | End: 2023-04-30

## 2023-04-27 RX ORDER — HEPARIN SODIUM 5000 [USP'U]/ML
5000 INJECTION INTRAVENOUS; SUBCUTANEOUS EVERY 8 HOURS
Refills: 0 | Status: DISCONTINUED | OUTPATIENT
Start: 2023-04-27 | End: 2023-04-30

## 2023-04-27 RX ORDER — POLYETHYLENE GLYCOL 3350 17 G/17G
17 POWDER, FOR SOLUTION ORAL DAILY
Refills: 0 | Status: DISCONTINUED | OUTPATIENT
Start: 2023-04-27 | End: 2023-04-30

## 2023-04-27 RX ORDER — ESMOLOL HCL 100MG/10ML
175 VIAL (ML) INTRAVENOUS
Qty: 2500 | Refills: 0 | Status: DISCONTINUED | OUTPATIENT
Start: 2023-04-27 | End: 2023-04-28

## 2023-04-27 RX ORDER — NALOXONE HYDROCHLORIDE 4 MG/.1ML
0.4 SPRAY NASAL ONCE
Refills: 0 | Status: COMPLETED | OUTPATIENT
Start: 2023-04-27 | End: 2023-04-27

## 2023-04-27 RX ORDER — NICARDIPINE HYDROCHLORIDE 30 MG/1
2.5 CAPSULE, EXTENDED RELEASE ORAL
Qty: 40 | Refills: 0 | Status: DISCONTINUED | OUTPATIENT
Start: 2023-04-27 | End: 2023-04-30

## 2023-04-27 RX ORDER — LACOSAMIDE 50 MG/1
100 TABLET ORAL EVERY 12 HOURS
Refills: 0 | Status: DISCONTINUED | OUTPATIENT
Start: 2023-04-27 | End: 2023-04-30

## 2023-04-27 RX ORDER — PANTOPRAZOLE SODIUM 20 MG/1
40 TABLET, DELAYED RELEASE ORAL
Refills: 0 | Status: DISCONTINUED | OUTPATIENT
Start: 2023-04-27 | End: 2023-04-30

## 2023-04-27 RX ORDER — LACOSAMIDE 50 MG/1
100 TABLET ORAL ONCE
Refills: 0 | Status: DISCONTINUED | OUTPATIENT
Start: 2023-04-27 | End: 2023-04-27

## 2023-04-27 RX ORDER — SODIUM CHLORIDE 9 MG/ML
3 INJECTION INTRAMUSCULAR; INTRAVENOUS; SUBCUTANEOUS EVERY 8 HOURS
Refills: 0 | Status: DISCONTINUED | OUTPATIENT
Start: 2023-04-27 | End: 2023-04-30

## 2023-04-27 RX ORDER — SODIUM CHLORIDE 9 MG/ML
500 INJECTION, SOLUTION INTRAVENOUS
Refills: 0 | Status: DISCONTINUED | OUTPATIENT
Start: 2023-04-27 | End: 2023-04-29

## 2023-04-27 RX ORDER — ASPIRIN/CALCIUM CARB/MAGNESIUM 324 MG
81 TABLET ORAL DAILY
Refills: 0 | Status: DISCONTINUED | OUTPATIENT
Start: 2023-04-27 | End: 2023-04-30

## 2023-04-27 RX ORDER — ACETAMINOPHEN 500 MG
650 TABLET ORAL EVERY 6 HOURS
Refills: 0 | Status: DISCONTINUED | OUTPATIENT
Start: 2023-04-27 | End: 2023-04-30

## 2023-04-27 RX ORDER — SODIUM CHLORIDE 9 MG/ML
750 INJECTION INTRAMUSCULAR; INTRAVENOUS; SUBCUTANEOUS ONCE
Refills: 0 | Status: DISCONTINUED | OUTPATIENT
Start: 2023-04-27 | End: 2023-04-29

## 2023-04-27 RX ORDER — VALPROIC ACID (AS SODIUM SALT) 250 MG/5ML
500 SOLUTION, ORAL ORAL ONCE
Refills: 0 | Status: COMPLETED | OUTPATIENT
Start: 2023-04-27 | End: 2023-04-27

## 2023-04-27 RX ADMIN — LACOSAMIDE 120 MILLIGRAM(S): 50 TABLET ORAL at 18:32

## 2023-04-27 RX ADMIN — NICARDIPINE HYDROCHLORIDE 2.5 MG/HR: 30 CAPSULE, EXTENDED RELEASE ORAL at 10:20

## 2023-04-27 RX ADMIN — Medication 60 MILLIGRAM(S): at 22:58

## 2023-04-27 RX ADMIN — ATORVASTATIN CALCIUM 20 MILLIGRAM(S): 80 TABLET, FILM COATED ORAL at 21:30

## 2023-04-27 RX ADMIN — HEPARIN SODIUM 5000 UNIT(S): 5000 INJECTION INTRAVENOUS; SUBCUTANEOUS at 22:27

## 2023-04-27 RX ADMIN — Medication 55 MILLIGRAM(S): at 15:15

## 2023-04-27 RX ADMIN — Medication 20 MICROGRAM(S)/KG/MIN: at 08:50

## 2023-04-27 RX ADMIN — NICARDIPINE HYDROCHLORIDE 12.5 MG/HR: 30 CAPSULE, EXTENDED RELEASE ORAL at 08:50

## 2023-04-27 RX ADMIN — SODIUM CHLORIDE 3 MILLILITER(S): 9 INJECTION INTRAMUSCULAR; INTRAVENOUS; SUBCUTANEOUS at 21:31

## 2023-04-27 RX ADMIN — Medication 650 MILLIGRAM(S): at 21:30

## 2023-04-27 RX ADMIN — Medication 650 MILLIGRAM(S): at 22:30

## 2023-04-27 RX ADMIN — NALOXONE HYDROCHLORIDE 0.4 MILLIGRAM(S): 4 SPRAY NASAL at 03:18

## 2023-04-27 NOTE — ED PROVIDER NOTE - PHYSICAL EXAMINATION
PHYSICAL EXAM:  GENERAL: Sitting in stretcher, lethargic, confused, intermittently responding to questions and commands appropriately  HENMT: Atraumatic, moist mucous membranes  EYES: Clear bilaterally, PERRL, EOMs intact b/l  HEART: RRR, nl S1/S2  RESPIRATORY: Clear to auscultation bilaterally, no wheezes  ABDOMEN: +BS, soft, nontender, nondistended  EXTREMITIES: Trace pitting edema b/l LE, +2 radial pulses b/l  NEURO:  A&Ox4, no focal motor deficits or sensory deficits   Heme/LYMPH: No ecchymosis or bruising, no anterior/posterior cervical or supraclavicular LAD  SKIN:  Skin warm, dry and intact. No evidence of rash. PHYSICAL EXAM:  GENERAL: Sitting in stretcher, lethargic, confused, Not responding to questions appropriately; intermittently following commands   HENMT: Atraumatic, moist mucous membranes  EYES: Clear bilaterally, PERRL, EOMs intact b/l  HEART: RRR, nl S1/S2  RESPIRATORY: Clear to auscultation bilaterally, no wheezes  ABDOMEN: +BS, soft, nontender, nondistended  EXTREMITIES: Trace pitting edema b/l LE, +2 radial pulses b/l  NEURO:  A&Ox1, RUE weakness compared to left  Heme/LYMPH: No ecchymosis or bruising, no anterior/posterior cervical or supraclavicular LAD  SKIN:  Skin warm, dry and intact. No evidence of rash.

## 2023-04-27 NOTE — ED ADULT NURSE NOTE - NSIMPLEMENTINTERV_GEN_ALL_ED
Implemented All Fall Risk Interventions:  [place] to call system. Call bell, personal items and telephone within reach. Instruct patient to call for assistance. Room bathroom lighting operational. Non-slip footwear when patient is off stretcher. Physically safe environment: no spills, clutter or unnecessary equipment. Stretcher in lowest position, wheels locked, appropriate side rails in place. Provide visual cue, wrist band, yellow gown, etc. Monitor gait and stability. Monitor for mental status changes and reorient to person, place, and time. Review medications for side effects contributing to fall risk. Reinforce activity limits and safety measures with patient and family.

## 2023-04-27 NOTE — PROGRESS NOTE ADULT - SUBJECTIVE AND OBJECTIVE BOX
CTICU  CRITICAL  CARE  attending     Hand off received. Pertinent clinical, laboratory, radiographic and hemodynamic data and chart were reviewed  Patient known to service-recent admission for AVR/aortic arch replacement, second stage TEVAR and CABG x 1 discharged 4/14  Readmitted 4/27 for further w/o of syncope vs breakthrough seizures   Seen and examined, HDS/ EEG in place alert and oriented   Labs and imaging reviewed    VITALS  Vital Signs Last 24 Hrs  T(C): 36.5 (28 Apr 2023 01:14), Max: 37 (27 Apr 2023 22:49)  T(F): 97.7 (28 Apr 2023 01:14), Max: 98.6 (27 Apr 2023 22:49)  HR: 85 (28 Apr 2023 02:00) (69 - 93)  BP: 145/87 (28 Apr 2023 02:00) (96/59 - 152/85)  BP(mean): 109 (28 Apr 2023 02:00) (72 - 113)  RR: 17 (28 Apr 2023 02:00) (16 - 20)  SpO2: 96% (28 Apr 2023 02:00) (95% - 100%)  Parameters below as of 28 Apr 2023 02:00  Patient On (Oxygen Delivery Method): room air    I&O's Summary  27 Apr 2023 07:01  -  28 Apr 2023 03:01  --------------------------------------------------------  IN: 1014.4 mL / OUT: 495 mL / NET: 519.4 mL      PHYSICAL EXAM  General: resting comfortably/ protecting airways/ NAD on RA   Respiratory: CTA B/L; no wheezes  Cardiovascular: Regular rhythm/rate  Gastrointestinal: Soft; NTND  Extremities: Warm, pitting edema L>R   Neurological:  CNII-XII grossly intact; no obvious focal deficits      IMAGING/EKG/ETC  Reviewed.

## 2023-04-27 NOTE — H&P ADULT - NSHPPHYSICALEXAM_GEN_ALL_CORE
Appearance: Lethargic  Neurologic: AAOx3. Generalized weakness, follow commands. Right arm weakness 2/2 fall.   HEENT:   MMM, PERRLA, EOMI	b/l  Neck: Supple  Cardiovascular: RRR, S1 S2. No m/r/g.  Respiratory: No acute respiratory distress. CTA b/l, no w/r/r.   Gastrointestinal:  Soft, non-tender, non-distended, + BS.	  Skin: + Left bicep ecchymosis. + Left knee healed abrasion. No rashes. No cyanosis.  Extremities: Exhibits normal range of motion, no clubbing, cyanosis or edema.  Vascular: Peripheral pulses palpable 2+ bilaterally.  Incision: MSI c/d/i.

## 2023-04-27 NOTE — ED CLERICAL - NS ED CARE COORDINATION INFORMATION
This patient is eligible for (or currently enrolled in) an outpatient care management program available through [employer]. This program can coordinate outpatient follow up and assist the patient in accessing a variety of outpatient resources.  If discharged from the ED, the patient will be contacted to see if any additional resources are needed.                                                                                    Please call the Nurse Clinical Call Center at [phone] with any questions or for assistance in discharge planning.

## 2023-04-27 NOTE — ED ADULT NURSE REASSESSMENT NOTE - NS ED NURSE REASSESS COMMENT FT1
report given to RN, funmilayo, at [facility] through transfer center. pt awaiting ambulance transport.

## 2023-04-27 NOTE — CONSULT NOTE ADULT - CRITICAL CARE ATTENDING COMMENT
code stroke called and neurology emergently assessed patient not tnk or evt candidate  Melanie   53M RH w/ epilepsy, abdominal aortic aneurysm (s/p EVAR 3/20/23),HTN, GERD presents with intermittent confusion and falls. Per wife at bedside, pt had a fall yesterday and today at 00:00 4/27/23. Pt was standing up and fell backwards, no LOC or seizure-like activity noted. Pt was diaphoretic and more confused after the fall. Pt was noted to be more fatigued and intermittently confused throughout the day, ex. asking for tape under the bed that was not there. CTH neg. CTA w/ aortic dissection into the brachiocephalic trunk, right subclavian artery and proximal common carotid artery.   o/e AAOx1-2, lethargic, ? asterexis but GODINEZ   Impression: intermittent confusion, diaphoresis likely in setting of aortic dissection, anemia. Low suspicion for acute ischemic stroke, no intracranial dissection on CTA    Recommendations:   -CT surgery consulted for aortic dissection  - toxic/metabolic/infectious workup per primary team  - c/w home vimpat 100mg BID and depakote 1000mg BID for known epilepsy  - check EEG   - when ready for discharge, outpatient f/u with his neurologist Dr. Delilah Hernandez  - plan to transfer [facility] to CTICU   spoke with wife in ED.    last seiuzre was in July otherwsise has been well controlled

## 2023-04-27 NOTE — CONSULT NOTE ADULT - SUBJECTIVE AND OBJECTIVE BOX
EPILEPSY CONSULT NOTE:   HPI:  53yr old male with PMH HTN, type A dissection sp Dacron grafts and AV resuspension (2013), CAD sp CABG x 1 (Adam, 5/2013, SVG-RCA), seizures, admitted for surgical mgmt of progression of aneurysmal disease. Recent admission 3/20-4/14 during which patient underwent replacement of transverse aortic arch, second stage thoracic endovascular aortic repair, aorto-axillary bypass, AV replacement (bio 23mm), and CABG x 1 (SVG-RCA) EF 75% (Ignacio, 3/20). Post op cb lactic acidosis, severe cardiogenic and vasogenic shock, TREY requiring CVVHD and temporary dialysis requirement. Patient presented to [facility] ED 4/27 for syncope vs seizure episode at home, falling onto back of head. Stroke code was called and imaging of head and neck was grossly unremarkable however dissection from EVAR to brachiocephalic/R subclavian artery and prox portion of R carotid and L common iliac with questionable endoleak. Patient was started on esmolol and ER team contacted Dr. Pierre who reported he saw patient in clinic 4/26 and was fine. In ED patient was lethargic with AMS and RUE weakness and difficulty providing history. Decision made to tx to [facility] for further workup. Of note patient saw his neurologist (Dr. Delilah Hernandez, [facility] [phone]) 4/26 with facial tremors, upper extremity tremors, had asterixis and tongue fasciculations. Requested ammonia level however patient refused to stay given his cardiology followup. Depakote level 65 on 750mg BID dosing. Was instructed to go to 1000BID and repeat levels tomorrow. Describes fall backward as focal seizure typical of patient.     Epilepsy consulted for concern for seizure on 4/27/23, and as per wife the event appeared to be a twist motion of his R foot while standing in the doorway of the bedroom as she asked for him to shift, and he fell backwards and head was cushioned by clothes in closet. However, as per primary team who spoke to patient's neurologist, report falling backwards was typical of his events, and there were concerns given tongue fasciculations, B/L hand tremors, and asterixis. This provider attempted to call provider David, but was unreachable. As per wife, seizure onset in 2018, and he was started on keppra and remained seizure free through 2019. His seizures became refractory as of 01/01/2020, and he was started on depakote and vimpat (controlled on this agent). The most recent event was on 7/4/22, and his neurologist weaned keppra, and lowered depakote from 1500mg Q12hrs to 750mg Q12hrs as of 11/2022. He has been stable and seizure free to date besides the unclear event during his last admission in 3/2023, and the current event in question. His events are described and recorded as facial pulling, and occasional arm movement. As per wife, patient has no identifiable seizure risks such as febrile seizures, head trauma w/ LOC, family hx of seizures, meningitis or encephalitis. She also reports that he is compliant with his regimen, and Depakote was recently increased from 750mg Q12hrs to 1000mg Q12hrs following tongue fasciculations, arm twitching and change in mental status (as previously mentioned). As per wife, his seizures were identified as onset from the L frontal lobe, and his neurologist is Delilah Hernandez at [facility] ([phone]).     Drug levels:   VPA level on 5/27/23 is 65.7, Ammonia level is 11    Epilepsy risk factors:  Head injury with subsequent LOC?: Denies  Febrile seizures in infancy?: Denies  Hx of CNS infection?: Denies  Family hx of epilepsy?: Denies  Known CNS pathology?: Denies     Prior AEDs: Keppra       Review of Systems:  Limited 2/2 minimal verbal output and somnolent    PAST MEDICAL & SURGICAL HISTORY:  HTN (hypertension)  Aortic dissection  CAD (coronary artery disease)  Seizure disorder  S/P aortic bifurcation bypass graft  S/P CABG x 1     FAMILY HISTORY:  No pertinent family history in first degree relatives    Social History:  Alcohol, illicits, smoking?: Former everyday marijuana smoker, social drinker and denies illicit drug use.     Allergies  No Known Allergies    MEDICATIONS  (STANDING):  esmolol  Infusion 175 MICROgram(s)/kG/Min (69.8 mL/Hr) IV Continuous <Continuous>  lacosamide IVPB 100 milliGRAM(s) IV Intermittent once  lacosamide IVPB 100 milliGRAM(s) IV Intermittent every 12 hours  sodium chloride 0.9% lock flush 3 milliLiter(s) IV Push every 8 hours  valproate sodium  IVPB 1000 milliGRAM(s) IV Intermittent every 12 hours    VITAL SIGNS:   T(C): 36.4 (04-27-23 @ 14:09), Max: 36.4 (04-27-23 @ 14:09)  HR: 83 (04-27-23 @ 16:45) (69 - 83)  BP: 103/59 (04-27-23 @ 16:45) (96/59 - 120/72)  RR: 18 (04-27-23 @ 16:45) (16 - 20)  SpO2: 100% (04-27-23 @ 16:45) (95% - 100%)  Wt(kg): --    PHYSICAL EXAM:   Constitutional:  Lying in bed w/ eyes closed, and at times wince with movement of R arm.  Extremities: no edema, clubbing or cyanosis  Skin: no rash or neurocutaneous signs     Cognitive:  Somnolent, but arousable,. Oriented to name, hospital and year. +tongue fasciculations, asterixis in B/L arms     Cranial Nerves:  III/IV/VI: PERRLA 3mm brisk EOMF No nystagmus (close eyes most times)   V1V2V3: Symmetric, VII: Face appears symmetric VIII: Normal to screening, IX/X: Palate Elevates Symmetrical  XI: Trapezius Symmetric  XII: Tongue midline  Motor:  Power: RUE 4/5 (pain limited), RLE 5-/5 (effort-based?), LLE 5/5, LUE 5/5  Sensation:  Intact to light touch.   Coordination/Gait:  Finger-nose-finger intact  Reflexes:  DTR: 2+ symmetric all 4 limbs  Plantar responses: Down bilaterally    LABS:   CBC Full  -  ( 27 Apr 2023 14:36 )  WBC Count : 8.49 K/uL  RBC Count : 2.73 M/uL  Hemoglobin : 8.1 g/dL  Hematocrit : 26.3 %  Platelet Count - Automated : 296 K/uL  Mean Cell Volume : 96.3 fl  Mean Cell Hemoglobin : 29.7 pg  Mean Cell Hemoglobin Concentration : 30.8 gm/dL  Auto Neutrophil # : x  Auto Lymphocyte # : x  Auto Monocyte # : x  Auto Eosinophil # : x  Auto Basophil # : x  Auto Neutrophil % : x  Auto Lymphocyte % : x  Auto Monocyte % : x  Auto Eosinophil % : x  Auto Basophil % : x    04-27    132<L>  |  95<L>  |  27<H>  ----------------------------<  79  4.2   |  29  |  2.33<H>    Ca    7.3<L>      27 Apr 2023 14:23  Phos  5.1     04-27  Mg     1.8     04-27    TPro  5.9<L>  /  Alb  2.4<L>  /  TBili  0.2  /  DBili  x   /  AST  10  /  ALT  5<L>  /  AlkPhos  73  04-27    LIVER FUNCTIONS - ( 27 Apr 2023 14:23 )  Alb: 2.4 g/dL / Pro: 5.9 g/dL / ALK PHOS: 73 U/L / ALT: 5 U/L / AST: 10 U/L / GGT: x           PT/INR - ( 27 Apr 2023 14:23 )   PT: 14.7 sec;   INR: 1.23     PTT - ( 27 Apr 2023 14:23 )  PTT:29.7 sec  Valproic Acid Level, Serum: 65.7 ug/mL [50.0 - 100.0] (04-27 @ 14:23)  Phenytoin Level, Serum: <0.8 ug/mL *L* [10.0 - 20.0] (04-27 @ 14:23)         EPILEPSY CONSULT NOTE:   HPI:  53yr old male with PMH HTN, type A dissection sp Dacron grafts and AV resuspension (2013), CAD sp CABG x 1 (Adam, 5/2013, SVG-RCA), seizures, admitted for surgical mgmt of progression of aneurysmal disease. Recent admission 3/20-4/14 during which patient underwent replacement of transverse aortic arch, second stage thoracic endovascular aortic repair, aorto-axillary bypass, AV replacement (bio 23mm), and CABG x 1 (SVG-RCA) EF 75% (Ignacio, 3/20). Post op cb lactic acidosis, severe cardiogenic and vasogenic shock, TREY requiring CVVHD and temporary dialysis requirement. Patient presented to [facility] ED 4/27 for syncope vs seizure episode at home, falling onto back of head. Stroke code was called and imaging of head and neck was grossly unremarkable however dissection from EVAR to brachiocephalic/R subclavian artery and prox portion of R carotid and L common iliac with questionable endoleak. Patient was started on esmolol and ER team contacted Dr. Pierre who reported he saw patient in clinic 4/26 and was fine. In ED patient was lethargic with AMS and RUE weakness and difficulty providing history. Decision made to tx to [facility] for further workup. Of note patient saw his neurologist (Dr. Delilah Hernandez, [facility] [phone]) 4/26 with facial tremors, upper extremity tremors, had asterixis and tongue fasciculations. Requested ammonia level however patient refused to stay given his cardiology followup. Depakote level 65 on 750mg BID dosing. Was instructed to go to 1000BID and repeat levels tomorrow. Describes fall backward as focal seizure typical of patient.     Epilepsy consulted for concern for seizure on 4/27/23, and as per wife the event appeared to be a twist motion of his R foot while standing in the doorway of the bedroom as she asked for him to shift, and he fell backwards and head was cushioned by clothes in closet. However, as per primary team who spoke to patient's neurologist, report falling backwards was typical of his events, and there were concerns given tongue fasciculations, B/L hand tremors, and asterixis. This provider attempted to call provider David, but was unreachable. As per wife, seizure onset in 2018, and he was started on keppra and remained seizure free through 2019. His seizures became refractory as of 01/01/2020, and he was started on depakote and vimpat (controlled on this agent). The most recent event was on 7/4/22, and his neurologist weaned keppra, and lowered depakote from 1500mg Q12hrs to 750mg Q12hrs as of 11/2022. He has been stable and seizure free to date besides the unclear event during his last admission in 3/2023, and the current event in question. His events are described and recorded as facial pulling, and occasional arm movement. As per wife, patient has no identifiable seizure risks such as febrile seizures, head trauma w/ LOC, family hx of seizures, meningitis or encephalitis. She also reports that he is compliant with his regimen, and Depakote was increased in past 1-2 days from 750mg Q12hrs to 1000mg Q12hrs following low level. He also was noted at that time to have tongue fasciculations, arm twitching and change in mental status (as previously mentioned). As per wife, his seizures were identified as onset from the L frontal lobe, and his neurologist is Delilah Hernandez at [facility] ().     Drug levels:   VPA level on 5/27/23 is 65.7, Ammonia level is 11    Epilepsy risk factors:  Head injury with subsequent LOC?: Denies  Febrile seizures in infancy?: Denies  Hx of CNS infection?: Denies  Family hx of epilepsy?: Denies  Known CNS pathology?: Denies     Prior AEDs: Keppra       Review of Systems:  Limited 2/2 minimal verbal output and somnolent    PAST MEDICAL & SURGICAL HISTORY:  HTN (hypertension)  Aortic dissection  CAD (coronary artery disease)  Seizure disorder  S/P aortic bifurcation bypass graft  S/P CABG x 1     FAMILY HISTORY:  No pertinent family history in first degree relatives    Social History:  Alcohol, illicits, smoking?: Former everyday marijuana smoker, social drinker and denies illicit drug use.     Allergies  No Known Allergies    MEDICATIONS  (STANDING):  esmolol  Infusion 175 MICROgram(s)/kG/Min (69.8 mL/Hr) IV Continuous <Continuous>  lacosamide IVPB 100 milliGRAM(s) IV Intermittent once  lacosamide IVPB 100 milliGRAM(s) IV Intermittent every 12 hours  sodium chloride 0.9% lock flush 3 milliLiter(s) IV Push every 8 hours  valproate sodium  IVPB 1000 milliGRAM(s) IV Intermittent every 12 hours    VITAL SIGNS:   T(C): 36.4 (04-27-23 @ 14:09), Max: 36.4 (04-27-23 @ 14:09)  HR: 83 (04-27-23 @ 16:45) (69 - 83)  BP: 103/59 (04-27-23 @ 16:45) (96/59 - 120/72)  RR: 18 (04-27-23 @ 16:45) (16 - 20)  SpO2: 100% (04-27-23 @ 16:45) (95% - 100%)  Wt(kg): --    PHYSICAL EXAM:   Constitutional:  Lying in bed w/ eyes closed, and at times wince with movement of R arm.  Extremities: no edema, clubbing or cyanosis  Skin: no rash or neurocutaneous signs     Cognitive:  Somnolent, but arousable,. Oriented to name, hospital and year. +tongue fasciculations, asterixis in B/L arms     Cranial Nerves:  III/IV/VI: PERRLA 3mm brisk EOMF No nystagmus (close eyes most times)   V1V2V3: Symmetric, VII: Face appears symmetric VIII: Normal to screening, IX/X: Palate Elevates Symmetrical  XI: Trapezius Symmetric  XII: Tongue midline  Motor:  Power: RUE 4/5 (pain limited), RLE 5-/5 (effort-based?), LLE 5/5, LUE 5/5  Sensation:  Intact to light touch.   Coordination/Gait:  Finger-nose-finger intact  Reflexes:  DTR: 2+ symmetric all 4 limbs  Plantar responses: Down bilaterally    LABS:   CBC Full  -  ( 27 Apr 2023 14:36 )  WBC Count : 8.49 K/uL  RBC Count : 2.73 M/uL  Hemoglobin : 8.1 g/dL  Hematocrit : 26.3 %  Platelet Count - Automated : 296 K/uL  Mean Cell Volume : 96.3 fl  Mean Cell Hemoglobin : 29.7 pg  Mean Cell Hemoglobin Concentration : 30.8 gm/dL  Auto Neutrophil # : x  Auto Lymphocyte # : x  Auto Monocyte # : x  Auto Eosinophil # : x  Auto Basophil # : x  Auto Neutrophil % : x  Auto Lymphocyte % : x  Auto Monocyte % : x  Auto Eosinophil % : x  Auto Basophil % : x    04-27    132<L>  |  95<L>  |  27<H>  ----------------------------<  79  4.2   |  29  |  2.33<H>    Ca    7.3<L>      27 Apr 2023 14:23  Phos  5.1     04-27  Mg     1.8     04-27    TPro  5.9<L>  /  Alb  2.4<L>  /  TBili  0.2  /  DBili  x   /  AST  10  /  ALT  5<L>  /  AlkPhos  73  04-27    LIVER FUNCTIONS - ( 27 Apr 2023 14:23 )  Alb: 2.4 g/dL / Pro: 5.9 g/dL / ALK PHOS: 73 U/L / ALT: 5 U/L / AST: 10 U/L / GGT: x           PT/INR - ( 27 Apr 2023 14:23 )   PT: 14.7 sec;   INR: 1.23     PTT - ( 27 Apr 2023 14:23 )  PTT:29.7 sec  Valproic Acid Level, Serum: 65.7 ug/mL [50.0 - 100.0] (04-27 @ 14:23)  Phenytoin Level, Serum: <0.8 ug/mL *L* [10.0 - 20.0] (04-27 @ 14:23)

## 2023-04-27 NOTE — ED ADULT NURSE NOTE - OBJECTIVE STATEMENT
Float RN- Pt presents with wife C/O intermittent confusions and frequent falls. as per wife Pt had a fall today. witnessed. Pt did not hit head or LOC. No complaints of chest pain, headache, nausea, dizziness, vomiting  SOB, fever, chills verbalized. ABD is soft, non tender, non distended. Pmhx epilepsy, abdominal aortic aneurysm (s/p EVAR 3/20/23).  will continue to monitor.

## 2023-04-27 NOTE — CONSULT NOTE ADULT - SUBJECTIVE AND OBJECTIVE BOX
HPI:  53M RH w/ epilepsy, abdominal aortic repair (3/20/23) presents with intermittent confusion and falls. Per wife at bedside, pt had a fall yesterday and today at 00:00 4/27/23. Pt was standing up and fell backwards, no LOC or seizure-like activity noted. Pt was diaphoretic and more confused after the fall. Pt was noted to be more fatigued and intermittently confused throughout the day, ex. asking for tape under the bed that was not there. Pt on aspirin 81mg daily, not on AC. Pt has not been ambulating since the surgery, has a walker, needs help with all ADLs. Pt's wife denies any speech disturbance, one sided weakness, vision changes. Pt has been on oxycodone PRN for his pain, last dose 22:00 4/26/23.    For his epilepsy, pt was diagnosed with seizures in 2018. He has an aura of "a wave" feeling then has LOC with generalized shaking. Pt is on vimpat 100mg BID and depakote 1000mg BID (increased on 4/26/23 from 750mg BID back to his usual dose 1000mg BID by his epileptologist Dr. Delilah Hernandez at [facility]).     (Stroke only)  LKN: unclear, 4/26/23  NIHSS: 4  preMRS: 4  Pt is not a candidate for tenecteplase due to unclear LKN  Pt is not a candidate for mechanical thrombectomy due to no large vessel occlusion on CTA    REVIEW OF SYSTEMS    A 10-system ROS was performed and is negative except for those items noted above and/or in the HPI.    PAST MEDICAL & SURGICAL HISTORY:    FAMILY HISTORY:    SOCIAL HISTORY:     Lives with: wife    MEDICATIONS (HOME):  Home Medications:    MEDICATIONS  (STANDING):  naloxone Injectable 0.4 milliGRAM(s) IV Push Once    MEDICATIONS  (PRN):    ALLERGIES/INTOLERANCES:  Allergies  No Known Allergies    Intolerances    VITALS & EXAMINATION:  Vital Signs Last 24 Hrs  T(C): 36.8 (27 Apr 2023 02:53), Max: 36.8 (27 Apr 2023 02:53)  T(F): 98.2 (27 Apr 2023 02:53), Max: 98.2 (27 Apr 2023 02:53)  HR: 63 (27 Apr 2023 02:53) (63 - 63)  BP: 158/79 (27 Apr 2023 02:53) (158/79 - 158/79)  BP(mean): --  RR: 17 (27 Apr 2023 02:53) (17 - 17)  SpO2: 100% (27 Apr 2023 02:53) (100% - 100%)    Parameters below as of 27 Apr 2023 02:53  Patient On (Oxygen Delivery Method): nasal cannula  O2 Flow (L/min): 2    General:  Constitutional: thin Male, appears stated age, in no apparent distress including pain  Head: Normocephalic & atraumatic.  Respiratory: No increased work of breathing at rest  Extremities: No cyanosis, clubbing, or edema.  Skin: No rashes, bruising, or discoloration.    Neurological (>12):  MS: Awake, alert, oriented to person, place (hospital to [facility]), situation, time (2023). Psychomotor slowing. Follows most simple commands.    Language: Speech is clear but hypophonic (baseline per wife), fluent with good repetition & comprehension (able to name objects thumb)    CNs: PERRL (R = 4mm, L = 4mm). VFF to BTT b/l. EOMI. V1-3 intact to LT. No facial asymmetry b/l. Hearing grossly normal (rubbing fingers) b/l. Gag reflex deferred.  Shoulder shrug intact b/l. Tongue initially deviated to left but normal movements, no atrophy.    Motor: Normal muscle bulk. No noticeable tremor or seizure. No drift in b/l UE in 10 seconds,  strength 4/5 but equal b/l. B/l LE with drift but does not hit bed in 5 seconds    Sensation: Intact to noxious stimuli b/l throughout.     Cortical: Extinction on DSS (neglect): none    Reflexes:              Biceps(C5)       BR(C6)     Triceps(C7)               Patellar(L4)    Achilles(S1)    Plantar Resp  R	2	          2	             2		        2		    2		Down   L	2	          2	             2		        2		    2		Down     Coordination: intact rapid-alt movements. No dysmetria to FTN/HTS    Gait: unable to assess due to fall risk    LABORATORY:  CBC                       7.5    13.78 )-----------( 286      ( 27 Apr 2023 03:32 )             23.7     STUDIES & IMAGING:     HPI:  53M RH w/ epilepsy, abdominal aortic repair (3/20/23) presents with intermittent confusion and falls. Per wife at bedside, pt had a fall yesterday and today at 00:00 4/27/23. Pt was standing up and fell backwards, no LOC or seizure-like activity noted. Pt was diaphoretic and more confused after the fall. Pt was noted to be more fatigued and intermittently confused throughout the day, ex. asking for tape under the bed that was not there. Pt on aspirin 81mg daily, not on AC. Pt has not been ambulating since the surgery, has a walker, needs help with all ADLs. Pt's wife denies any speech disturbance, one sided weakness, vision changes. Pt has been on oxycodone PRN for his pain, last dose 22:00 4/26/23.    For his epilepsy, pt was diagnosed with seizures in 2018. He has an aura of "a wave" feeling then has LOC with generalized shaking. Pt is on vimpat 100mg BID and depakote 1000mg BID (increased on 4/26/23 from 750mg BID back to his usual dose 1000mg BID by his epileptologist Dr. Delilah Hernandez at [facility]).     (Stroke only)  LKN: unclear, 4/26/23  NIHSS: 4  preMRS: 4  Pt is not a candidate for tenecteplase due to unclear LKN  Pt is not a candidate for mechanical thrombectomy due to no large vessel occlusion on CTA    REVIEW OF SYSTEMS    A 10-system ROS was performed and is negative except for those items noted above and/or in the HPI.    PAST MEDICAL & SURGICAL HISTORY:    FAMILY HISTORY:    SOCIAL HISTORY:     Lives with: wife    MEDICATIONS (HOME):  Home Medications:    MEDICATIONS  (STANDING):  naloxone Injectable 0.4 milliGRAM(s) IV Push Once    MEDICATIONS  (PRN):    ALLERGIES/INTOLERANCES:  Allergies  No Known Allergies    Intolerances    VITALS & EXAMINATION:  Vital Signs Last 24 Hrs  T(C): 36.8 (27 Apr 2023 02:53), Max: 36.8 (27 Apr 2023 02:53)  T(F): 98.2 (27 Apr 2023 02:53), Max: 98.2 (27 Apr 2023 02:53)  HR: 63 (27 Apr 2023 02:53) (63 - 63)  BP: 158/79 (27 Apr 2023 02:53) (158/79 - 158/79)  BP(mean): --  RR: 17 (27 Apr 2023 02:53) (17 - 17)  SpO2: 100% (27 Apr 2023 02:53) (100% - 100%)    Parameters below as of 27 Apr 2023 02:53  Patient On (Oxygen Delivery Method): nasal cannula  O2 Flow (L/min): 2    General:  Constitutional: thin Male, appears stated age, in no apparent distress including pain  Head: Normocephalic & atraumatic.  Respiratory: No increased work of breathing at rest  Extremities: No cyanosis, clubbing, or edema.  Skin: No rashes, bruising, or discoloration.    Neurological (>12):  MS: Eyes closed but arouses to verbal and tactile stimuli, needs occasional stimuli to maintain arousal, oriented to person, place (hospital to [facility]), situation, time (2023). Psychomotor slowing. Follows most simple commands.    Language: Speech is clear but hypophonic (baseline per wife), fluent with good repetition & comprehension (able to name objects thumb), some perseveration    CNs: PERRL (R = 4mm, L = 4mm). VFF to BTT b/l. EOMI. V1-3 intact to LT. No facial asymmetry b/l. Hearing grossly normal (rubbing fingers) b/l. Gag reflex deferred.  Shoulder shrug intact b/l. Tongue initially deviated to left but normal movements, no atrophy.    Motor: Normal muscle bulk. No noticeable tremor or seizure. No drift in b/l UE in 10 seconds,  strength 4/5 but equal b/l. B/l LE with drift but does not hit bed in 5 seconds    Sensation: Intact to noxious stimuli b/l throughout.     Cortical: Extinction on DSS (neglect): none    Reflexes:              Biceps(C5)       BR(C6)     Triceps(C7)               Patellar(L4)    Achilles(S1)    Plantar Resp  R	2	          2	             2		        2		    2		Down   L	2	          2	             2		        2		    2		Down     Coordination: intact to finger tapping b/l. No dysmetria to FTN     Gait: unable to assess due to fall risk    LABORATORY:  CBC                       7.5    13.78 )-----------( 286      ( 27 Apr 2023 03:32 )             23.7     STUDIES & IMAGING:    < from: CT Brain Stroke Protocol (04.27.23 @ 03:49) >  IMPRESSION:    No CT evidence of acute intracranial hemorrhage, mass effect, or midline   shift. Age-related involutional and microvascular changes.    < end of copied text >     HPI:  53M RH w/ epilepsy, abdominal aortic aneurysm (s/p EVAR 3/20/23) presents with intermittent confusion and falls. Per wife at bedside, pt had a fall yesterday and today at 00:00 4/27/23. Pt was standing up and fell backwards, no LOC or seizure-like activity noted. Pt was diaphoretic and more confused after the fall. Pt was noted to be more fatigued and intermittently confused throughout the day, ex. asking for tape under the bed that was not there. Pt on aspirin 81mg daily, not on AC. Pt has not been ambulating since the surgery, has a walker, needs help with all ADLs. Pt's wife denies any speech disturbance, one sided weakness, vision changes. Pt has been on oxycodone PRN for his pain, last dose 22:00 4/26/23.    For his epilepsy, pt was diagnosed with seizures in 2018. He has an aura of "a wave" feeling then has LOC with generalized shaking. Pt is on vimpat 100mg BID and depakote 1000mg BID (increased on 4/26/23 from 750mg BID back to his usual dose 1000mg BID by his epileptologist Dr. Delilah Hernandez at [facility]).     (Stroke only)  LKN: unclear, 4/26/23  NIHSS: 4  preMRS: 4  Pt is not a candidate for tenecteplase due to unclear LKN  Pt is not a candidate for mechanical thrombectomy due to no large vessel occlusion on CTA    REVIEW OF SYSTEMS    A 10-system ROS was performed and is negative except for those items noted above and/or in the HPI.    PAST MEDICAL & SURGICAL HISTORY:    FAMILY HISTORY:    SOCIAL HISTORY:     Lives with: wife    MEDICATIONS (HOME):  Home Medications:    MEDICATIONS  (STANDING):  naloxone Injectable 0.4 milliGRAM(s) IV Push Once    MEDICATIONS  (PRN):    ALLERGIES/INTOLERANCES:  Allergies  No Known Allergies    Intolerances    VITALS & EXAMINATION:  Vital Signs Last 24 Hrs  T(C): 36.8 (27 Apr 2023 02:53), Max: 36.8 (27 Apr 2023 02:53)  T(F): 98.2 (27 Apr 2023 02:53), Max: 98.2 (27 Apr 2023 02:53)  HR: 63 (27 Apr 2023 02:53) (63 - 63)  BP: 158/79 (27 Apr 2023 02:53) (158/79 - 158/79)  BP(mean): --  RR: 17 (27 Apr 2023 02:53) (17 - 17)  SpO2: 100% (27 Apr 2023 02:53) (100% - 100%)    Parameters below as of 27 Apr 2023 02:53  Patient On (Oxygen Delivery Method): nasal cannula  O2 Flow (L/min): 2    General:  Constitutional: thin Male, appears stated age, in no apparent distress including pain  Head: Normocephalic & atraumatic.  Respiratory: No increased work of breathing at rest  Extremities: No cyanosis, clubbing, or edema.  Skin: No rashes, bruising, or discoloration.    Neurological (>12):  MS: Eyes closed but arouses to verbal and tactile stimuli, needs occasional stimuli to maintain arousal, oriented to person, place (hospital to [facility]), situation, time (2023). Psychomotor slowing. Follows most simple commands.    Language: Speech is clear but hypophonic (baseline per wife), fluent with good repetition & comprehension (able to name objects thumb), some perseveration    CNs: PERRL (R = 4mm, L = 4mm). VFF to BTT b/l. EOMI. V1-3 intact to LT. No facial asymmetry b/l. Hearing grossly normal (rubbing fingers) b/l. Gag reflex deferred.  Shoulder shrug intact b/l. Tongue initially deviated to left but normal movements, no atrophy.    Motor: Normal muscle bulk. No noticeable tremor or seizure. No drift in b/l UE in 10 seconds,  strength 4/5 but equal b/l. B/l LE with drift but does not hit bed in 5 seconds    Sensation: Intact to noxious stimuli b/l throughout.     Cortical: Extinction on DSS (neglect): none    Reflexes:              Biceps(C5)       BR(C6)     Triceps(C7)               Patellar(L4)    Achilles(S1)    Plantar Resp  R	2	          2	             2		        2		    2		Down   L	2	          2	             2		        2		    2		Down     Coordination: intact to finger tapping b/l. No dysmetria to FTN     Gait: unable to assess due to fall risk    LABORATORY:  CBC                       7.5    13.78 )-----------( 286      ( 27 Apr 2023 03:32 )             23.7     STUDIES & IMAGING:    < from: CT Brain Stroke Protocol (04.27.23 @ 03:49) >  IMPRESSION:    No CT evidence of acute intracranial hemorrhage, mass effect, or midline   shift. Age-related involutional and microvascular changes.    < end of copied text >    < from: Xray Chest 1 View- PORTABLE-Urgent (Xray Chest 1 View- PORTABLE-Urgent .) (04.27.23 @ 04:00) >  IMPRESSION:    Status post thoracic EVAR with prominent aortic knob concerning for   endoleak. Follow-up CTA of the chest abdomen and pelvis to evaluate for   endoleak.        ******PRELIMINARY REPORT******      < end of copied text >     HPI:  53M RH w/ epilepsy, abdominal aortic aneurysm (s/p EVAR 3/20/23), HTN, GERD presents with intermittent confusion and falls. Per wife at bedside, pt had a fall yesterday and today at 00:00 4/27/23. Pt was standing up and fell backwards, no LOC or seizure-like activity noted. Pt was diaphoretic and more confused after the fall. Pt was noted to be more fatigued and intermittently confused throughout the day, ex. asking for tape under the bed that was not there. Pt on aspirin 81mg daily, not on AC. Pt has not been ambulating since the surgery, has a walker, needs help with all ADLs. Pt's wife denies any speech disturbance, one sided weakness, vision changes. Pt has been on oxycodone PRN for his pain, last dose 22:00 4/26/23.    For his epilepsy, pt was diagnosed with seizures in 2018. He has an aura of "a wave" feeling then has LOC with generalized shaking. Pt is on vimpat 100mg BID and depakote 1000mg BID (increased on 4/26/23 from 750mg BID back to his usual dose 1000mg BID by his epileptologist Dr. Delilah Hernandez at [facility]).     (Stroke only)  LKN: unclear, 4/26/23  NIHSS: 4  preMRS: 4  Pt is not a candidate for tenecteplase due to unclear LKN  Pt is not a candidate for mechanical thrombectomy due to no large vessel occlusion on CTA    REVIEW OF SYSTEMS    A 10-system ROS was performed and is negative except for those items noted above and/or in the HPI.    PAST MEDICAL & SURGICAL HISTORY:    FAMILY HISTORY:    SOCIAL HISTORY:     Lives with: wife    MEDICATIONS (HOME):  Home Medications:    MEDICATIONS  (STANDING):  naloxone Injectable 0.4 milliGRAM(s) IV Push Once    MEDICATIONS  (PRN):    ALLERGIES/INTOLERANCES:  Allergies  No Known Allergies    Intolerances    VITALS & EXAMINATION:  Vital Signs Last 24 Hrs  T(C): 36.8 (27 Apr 2023 02:53), Max: 36.8 (27 Apr 2023 02:53)  T(F): 98.2 (27 Apr 2023 02:53), Max: 98.2 (27 Apr 2023 02:53)  HR: 63 (27 Apr 2023 02:53) (63 - 63)  BP: 158/79 (27 Apr 2023 02:53) (158/79 - 158/79)  BP(mean): --  RR: 17 (27 Apr 2023 02:53) (17 - 17)  SpO2: 100% (27 Apr 2023 02:53) (100% - 100%)    Parameters below as of 27 Apr 2023 02:53  Patient On (Oxygen Delivery Method): nasal cannula  O2 Flow (L/min): 2    General:  Constitutional: thin Male, appears stated age, in no apparent distress including pain  Head: Normocephalic & atraumatic.  Respiratory: No increased work of breathing at rest  Extremities: No cyanosis, clubbing, or edema.  Skin: No rashes, bruising, or discoloration.    Neurological (>12):  MS: Eyes closed but arouses to verbal and tactile stimuli, needs occasional stimuli to maintain arousal, oriented to person, place (hospital to [facility]), situation, time (2023). Psychomotor slowing. Follows most simple commands.    Language: Speech is clear but hypophonic (baseline per wife), fluent with good repetition & comprehension (able to name objects thumb), some perseveration    CNs: PERRL (R = 4mm, L = 4mm). VFF to BTT b/l. EOMI. V1-3 intact to LT. No facial asymmetry b/l. Hearing grossly normal (rubbing fingers) b/l. Gag reflex deferred.  Shoulder shrug intact b/l. Tongue initially deviated to left but normal movements, no atrophy.    Motor: Normal muscle bulk. No noticeable tremor or seizure. No drift in b/l UE in 10 seconds,  strength 4/5 but equal b/l. B/l LE with drift but does not hit bed in 5 seconds    Sensation: Intact to noxious stimuli b/l throughout.     Cortical: Extinction on DSS (neglect): none    Reflexes:              Biceps(C5)       BR(C6)     Triceps(C7)               Patellar(L4)    Achilles(S1)    Plantar Resp  R	2	          2	             2		        2		    2		Down   L	2	          2	             2		        2		    2		Down     Coordination: intact to finger tapping b/l. No dysmetria to FTN     Gait: unable to assess due to fall risk    LABORATORY:  CBC                       7.5    13.78 )-----------( 286      ( 27 Apr 2023 03:32 )             23.7     STUDIES & IMAGING:    < from: CT Brain Stroke Protocol (04.27.23 @ 03:49) >  IMPRESSION:    No CT evidence of acute intracranial hemorrhage, mass effect, or midline   shift. Age-related involutional and microvascular changes.    < end of copied text >    < from: Xray Chest 1 View- PORTABLE-Urgent (Xray Chest 1 View- PORTABLE-Urgent .) (04.27.23 @ 04:00) >  IMPRESSION:    Status post thoracic EVAR with prominent aortic knob concerning for   endoleak. Follow-up CTA of the chest abdomen and pelvis to evaluate for   endoleak.        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 Partially   visualized thoracic aortic repair with possible endoleak that is   incompletely visualized. Recommend further evaluation with a dedicated   aortic dissection study of the chest abdomen and pelvis.    CT angiography brain: No intracranial aneurysm or hemodynamically   significant intracranial stenosis.    < end of copied text >     HPI:  53M RH w/ epilepsy, abdominal aortic aneurysm (s/p EVAR 3/20/23), HTN, GERD presents with intermittent confusion and falls. Per wife at bedside, pt had a fall yesterday and today at 00:00 4/27/23. Pt was standing up and fell backwards, no LOC or seizure-like activity noted. Pt was diaphoretic and more confused after the fall. Pt was noted to be more fatigued and intermittently confused throughout the day, ex. asking for tape under the bed that was not there. Pt on aspirin 81mg daily, not on AC. Pt has not been ambulating since the surgery, has a walker, needs help with all ADLs. Pt's wife denies any speech disturbance, one sided weakness, vision changes. Pt has been on oxycodone PRN for his pain, last dose 22:00 4/26/23.    For his epilepsy, pt was diagnosed with seizures in 2018. He has an aura of "a wave" feeling then has LOC with generalized shaking. Pt is on vimpat 100mg BID and depakote 1000mg BID (increased on 4/26/23 from 750mg BID back to his usual dose 1000mg BID by his epileptologist Dr. Delilah Hernandez at [facility]).     (Stroke only)  LKN: unclear, 4/26/23  NIHSS: 4  preMRS: 4  Pt is not a candidate for tenecteplase due to unclear LKN  Pt is not a candidate for mechanical thrombectomy due to no large vessel occlusion on CTA    REVIEW OF SYSTEMS    A 10-system ROS was performed and is negative except for those items noted above and/or in the HPI.    PAST MEDICAL & SURGICAL HISTORY:    FAMILY HISTORY:    SOCIAL HISTORY:     Lives with: wife    MEDICATIONS (HOME):  Home Medications:    MEDICATIONS  (STANDING):  naloxone Injectable 0.4 milliGRAM(s) IV Push Once    MEDICATIONS  (PRN):    ALLERGIES/INTOLERANCES:  Allergies  No Known Allergies    Intolerances    VITALS & EXAMINATION:  Vital Signs Last 24 Hrs  T(C): 36.8 (27 Apr 2023 02:53), Max: 36.8 (27 Apr 2023 02:53)  T(F): 98.2 (27 Apr 2023 02:53), Max: 98.2 (27 Apr 2023 02:53)  HR: 63 (27 Apr 2023 02:53) (63 - 63)  BP: 158/79 (27 Apr 2023 02:53) (158/79 - 158/79)  BP(mean): --  RR: 17 (27 Apr 2023 02:53) (17 - 17)  SpO2: 100% (27 Apr 2023 02:53) (100% - 100%)    Parameters below as of 27 Apr 2023 02:53  Patient On (Oxygen Delivery Method): nasal cannula  O2 Flow (L/min): 2    General:  Constitutional: thin Male, appears stated age, in no apparent distress including pain  Head: Normocephalic & atraumatic.  Respiratory: No increased work of breathing at rest  Extremities: No cyanosis, clubbing, or edema.  Skin: No rashes, bruising, or discoloration.    Neurological (>12):  MS: Eyes closed but arouses to verbal and tactile stimuli, needs occasional stimuli to maintain arousal, oriented to person, place (hospital to [facility]), time (2023). Psychomotor slowing. Follows most simple commands.    Language: Speech is clear but hypophonic (baseline per wife), fluent with good repetition & comprehension (able to name objects thumb), some perseveration    CNs: PERRL (R = 4mm, L = 4mm). VFF to BTT b/l. EOMI. V1-3 intact to LT. No facial asymmetry b/l. Hearing grossly normal (rubbing fingers) b/l. Gag reflex deferred.  Shoulder shrug intact b/l. Tongue initially deviated to left but normal movements, no atrophy.    Motor: Normal muscle bulk. No noticeable tremor or seizure. No drift in b/l UE in 10 seconds,  strength 4/5 but equal b/l. B/l LE with drift but does not hit bed in 5 seconds    Sensation: Intact to noxious stimuli b/l throughout.     Cortical: Extinction on DSS (neglect): none    Reflexes:              Biceps(C5)       BR(C6)     Triceps(C7)               Patellar(L4)    Achilles(S1)    Plantar Resp  R	2	          2	             2		        2		    2		Down   L	2	          2	             2		        2		    2		Down     Coordination: intact to finger tapping b/l. No dysmetria to FTN     Gait: unable to assess due to fall risk    LABORATORY:  CBC                       7.5    13.78 )-----------( 286      ( 27 Apr 2023 03:32 )             23.7     STUDIES & IMAGING:    < from: CT Brain Stroke Protocol (04.27.23 @ 03:49) >  IMPRESSION:    No CT evidence of acute intracranial hemorrhage, mass effect, or midline   shift. Age-related involutional and microvascular changes.    < end of copied text >    < from: Xray Chest 1 View- PORTABLE-Urgent (Xray Chest 1 View- PORTABLE-Urgent .) (04.27.23 @ 04:00) >  IMPRESSION:    Status post thoracic EVAR with prominent aortic knob concerning for   endoleak. Follow-up CTA of the chest abdomen and pelvis to evaluate for   endoleak.        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 Partially   visualized thoracic aortic repair with possible endoleak that is   incompletely visualized. Recommend further evaluation with a dedicated   aortic dissection study of the chest abdomen and pelvis.    CT angiography brain: No intracranial aneurysm or hemodynamically   significant intracranial stenosis.    < end of copied text >

## 2023-04-27 NOTE — H&P ADULT - NSHPREVIEWOFSYSTEMS_GEN_ALL_CORE
Review of Systems  CONSTITUTIONAL:  Denies Fevers / chills, sweats, fatigue, weight loss, weight gain                                      NEURO:  + syncope/seizure/confusion. Denies parathesias.  EYES:  Denies Blurry vision, discharge, pain, loss of vision                                                                                    ENMT:  Denies Difficulty hearing, vertigo, dysphagia, epistaxis, recent dental work                                       CV:  Denies Chest pain, palpitations, TAYLOR, orthopnea                                                                                          RESPIRATORY:  Denies Wheezing, SOB, cough / sputum, hemoptysis                                                                GI:  Denies Nausea, vomiting, diarrhea, constipation, melena, difficulty swallowing                                               : Denies Hematuria, dysuria, urgency, incontinence                                                                                         MUSCULOSKELETAL:  Denies arthritis, joint swelling, muscle weakness                                                             SKIN/BREAST:  Denies rash, itching, hair loss, masses                                                                                            PSYCH:  Denies depresion, anxiety, suicidal ideation                                                                                               HEME/LYMPH:  Denies bruises easily, enlarged lymph nodes, tender lymph nodes                                        ENDOCRINE:  Denies cold intolerance, heat intolerance, polydipsia

## 2023-04-27 NOTE — ED PROVIDER NOTE - ATTENDING CONTRIBUTION TO CARE
I performed a face-to-face evaluation of the patient and performed a history and physical examination along with the resident or ACP, and/or medical student above.  I agree with the history and physical examination as documented by the resident or ACP, and/or medical student above.  Dasha:  54yo M w/ pmh inclusive of htn, GERD, seizure d/o, s/p Thoracic EVAR of aortic arch on 3/20 @ [facility] (by CT surgeon Dr. Elvis Pierre), bibems from home for AMS. On arrival, pt with stable vitals but appears somnolent/lethargic, unable to contribute to HPI, therefore collateral obtained from his wife:  Pt's wife stating that pt was reportedly recently dc'd from hospital and was home in his usual state of health when at approx 12:30am - she witnessed pt fall back onto his buttocks, no head strike, no LOC, no seizure like-activity and not on blood thinners. Pt then had trouble getting up from floor and appeared confused, therefore wife called EMS. Given recent dc on oxycodone though small 5mg dose, considered opioid narcosis- gave narcan with no effect. On my exam, pt with 4/5 hand  strength on Right side compared w/ 5/5 strength on LUE (and pt is right hand dominant), confused and barely following commands. Called code stroke, pt evaluated by neurology resident immediately and taken to CT scanner. Rec'd call from radiologist reporting aortic dissection from EVAR to brachiocephalic trunk/R subclavian artery, prox portion of R carotid artery, and L common iliac artery, with ?endoleak that is incompletely visualized. Given pt's current BP and HR (BP 130s/70s and HR in 70s), decided against esmolol at this time. Ddx includes new dissection vs CVA vs metabolic encephalopathy (considered infectious much less likely given no fever/chills/cough/urinary symptoms lately),  but given pt's recent surgical history and CT read without previous read for comparison - decided transfer to [facility] is most appropriate in the event that dissection is new. Resident spoke with CT surgery PA whom had trouble contacting Thoracic surgeon, therefore I called transfer center to arrange for transfer, spoke with Dr. Byrd who was resistant to transfer because CT findings might be unchanged from prior. Explained to Vatsia that neither radiologist nor I were able to find prior CT results during our initial chart review, and that either way - transfer to [facility] (where thoracic surgery service is available) is likely best for pt as we do not currently have a definite source for his AMS and RUE weakness (and given that pt altered at this time, he is unable to provide a history therefore we cannot clinically correlate for cp, back pain, focal weakness, etc). Performed a second chart review after this phone call and found pt has prior records with at least 3 different MRNs. MRN from [facility] visit is [id number]. Called back radiologist and informed them of same for comparison, and if necessary addendum of CT read. Will transfer to [facility].

## 2023-04-27 NOTE — ED ADULT NURSE REASSESSMENT NOTE - NSFALLRSKASSESSDT_ED_ALL_ED
27-Apr-2023 05:14
27-Apr-2023 09:45
27-Apr-2023 06:37
27-Apr-2023 07:14
27-Apr-2023 07:35
27-Apr-2023 09:17
27-Apr-2023 10:25
27-Apr-2023 08:57

## 2023-04-27 NOTE — ED PROVIDER NOTE - CARE PLAN
Principal Discharge DX:	AMS (altered mental status)  Secondary Diagnosis:	Thoracic aortic dissection   1

## 2023-04-27 NOTE — ED ADULT NURSE REASSESSMENT NOTE - NS ED NURSE REASSESS COMMENT FT1
float RN: pt A&ox1, confused, lethargic, responds to verbal stimuli. pt BP elevated, MD Balderas made aware and at bedside. medications to be started as per ordered. breathing is spontaneous and unlabored. NSR on continuos telemetry monitor. Cantrell in place draining yellow clear fluid. family at bedside. pt awaiting transfer to [facility]. float RN: pt A&ox1, confused, lethargic, responds to verbal stimuli.  pt BP elevated, MD Balderas made aware and at bedside. medications to be started as per ordered. breathing is spontaneous and unlabored. NSR on continuos telemetry monitor. 14 F urinary Cantrell in place draining yellow clear fluid. family at bedside. pt awaiting transfer to [facility].

## 2023-04-27 NOTE — ED ADULT NURSE REASSESSMENT NOTE - NS ED NURSE REASSESS COMMENT FT1
pt awake and alert. pt respirations even and unlabored with no accessory muscle use. transfer to [facility] cancelled, pt pending transport to [facility] at this time. pt normal sinus on monitor. pt appears in NAD and stable upon exiting room

## 2023-04-27 NOTE — ED ADULT NURSE REASSESSMENT NOTE - NS ED NURSE REASSESS COMMENT FT1
pt A&ox1, continues to be lethargic, no new change in mental status, will respond to verbal stimuli. breathing spontaneous and unlabored. NSR on telemetry. Pt BP low, MD Balderas made aware, As per MD hold Nicardipine infusion, continue esmolol gtt, and ressassess pts BP. pt awaiting transfer to [facility], report given to Transfer Center by KUN Brown  to [facility] KUN Estrada.

## 2023-04-27 NOTE — ED ADULT NURSE NOTE - NS ED NURSE TRANSFER FORMS DT
Chandrakant's motherSondra was called to convey [facility] ([facility] test results. [facility] hours of operation and phone number [phone] left for patient to call back.     STREP GROUP A CULTURE   Order: [id number]   Status:  Final result   Visible to patient:  No (Not Released)   Dx:  Sore throat; Fever, unspecified fever...    3d ago   Specimen Description THROAT    CULTURE NO BETA HEMOLYTIC STREPTOCOCCI ISOLATED    REPORT STATUS 03/28/2018 FINAL    Resulting Agency [facility]      Specimen Collected: 03/26/18 14:05   Last Resulted: 03/28/18 19:42                 Patient Transferred to: [facility] 10  Handoff Report Given to: KADY Sinclair   27-Apr-2023 07:11

## 2023-04-27 NOTE — ED ADULT NURSE REASSESSMENT NOTE - NS ED NURSE REASSESS COMMENT FT1
report rcd from break rn tio. pt a&ox1 appears lethargic. pt normal sinus on monitor. 20g to the LFA and left wrist with no redness or swelling. pt respirations even and unlabored with no accessory muscle use. pt on 3L NC sat 100%. pt ecchymosis to left cheek consistent with falls prior to arrival. pt pending CT results. repeat labs collected and sent.

## 2023-04-27 NOTE — PROGRESS NOTE ADULT - ASSESSMENT
52 M hx of current every day marijuana use w/pmh of HTN, type A aortic dissection s/p Dacron grafts, AV resuspension in 2013,CAD s/p CABG x 1 SVG to RCA and ure disorder. recently admitted at [facility] underwent AVR/ Arch replacement/ aorto-axillary bypass- CABG x 1 and TEVAR   3/20  Postop c/b TREY requiring temporary dialytic interventions he was discharged home on 4/14. presented to [facility] on 4/27 after a fall and concern for syncope versus seizures. lethargic and altered in ED CTH was grossly unremarkable but with questionable concern for endoleak for which he was started on esmolol drip, subsequently tx to [facility] for further w/u.  CTA reviewed by Dr. Pierre-- no concern for acute dissection or endoleak   He had an oupt visit with neurologist Dr. Hernandez for seizure hx and was noted to have asterixis and tongue fasciculation with plan to increase Depakote dose.  A/p  EEG in place/ no fasciculations or tremors noted- MS improved   Continue antiepileptic regimen per epilepsy recs/ Vimpat 100 q 12 and Depakote 1000 q12  Esmolol gtt to prevent Bp spikes   Recovering iATN, came off dialysis Cr plateaued ~ 1.7 on discharge now 2.3/ urine studies reviewed given contrast load will hydrate with 1 cc/kg of NS and monitor renal fx marginal UOP also likely related to contrast   Poor PO intake- D5 infusion for borderline blood sugars and avoiding hypoglycemia in the setting of possible seizures give  Continue ASA/Statin, ICU ppx     ATTENDING: I have personally and independently provided 45 Min of critical care services.

## 2023-04-27 NOTE — ED PROVIDER NOTE - OBJECTIVE STATEMENT
54 y/o M PMH Sz disorder, HTN, GERD, recent thoracic aortic aneurysm at [facility] (Dr. Pierre, March 20th 2023) discharged yesterday BIBEMS for AMS and fall. Pt lethargic and confused at this time unable to provide reliable history. Wife reports pt was with her in their bedroom where he was standing and appeared to trip backwards onto the floor, unsure if there was LOC. Once sitting on the floor pt was noticeably confused, not responding appropriately to any questions thus wife called EMS. Pt was in his normal state of 52 y/o M PMH Sz disorder, HTN, GERD, recent thoracic aortic aneurysm at [facility] (Dr. Pierre, March 20th 2023) discharged yesterday BIBEMS for AMS and fall. Pt lethargic and confused at this time unable to provide reliable history. Wife reports pt was with her in their bedroom where he was standing and appeared to trip backwards onto the floor, unsure if there was LOC. Once sitting on the floor pt was noticeably confused, not responding appropriately to any questions thus wife called EMS. Pt was in his normal state of health after discharge yesterday 54 y/o M PMH Sz disorder, HTN, GERD, recent thoracic aortic aneurysm at [facility] (Dr. Pierre, March 20th 2023) discharged 4/14 BIBEMS for AMS and fall. Pt lethargic and confused at this time unable to provide reliable history. Wife reports pt was with her in their bedroom where pt was standing and in his normal state of health, however then appeared to trip backwards onto his buttocks on the floor, did not appear to lose consciousness. Once sitting on the floor pt was noticeably confused, not responding appropriately to any questions thus wife called EMS. Wife states pt was in his normal state of health since discharge from [facility]. Wife notes pt has been taking oxycodone 5mg prn for pain control, but has not had significant lethargy from this and does not believe he has taken too many.

## 2023-04-27 NOTE — ED ADULT NURSE REASSESSMENT NOTE - NS ED NURSE REASSESS COMMENT FT1
Shankar Sauceda made aware of Nicardipine drip reaching max dose of 15mg/hr. As per MD Rowe continue to administer medication at rate of 15mg/hr and titrate as per order.

## 2023-04-27 NOTE — ED ADULT TRIAGE NOTE - CHIEF COMPLAINT QUOTE
Pt. arrives s/p syncopal episode at around 0130 am. patient appears lethargic, arousable to painful stimuli. Pt. arrives on 2L nasal cannula sp02 100%.Patient unable to follow commands during assessment.  PHx HTN, aortic repair on 3/20. Charge RN made aware, patient taken back to trauma A.

## 2023-04-27 NOTE — ED ADULT NURSE REASSESSMENT NOTE - NS ED NURSE REASSESS COMMENT FT1
pt a&ox2. (able to state name and hospital) responsive to stimuli. pt appears lethargic. pt normal sinus on cardiac monitor. pt respirations even and unlabored with no accessory muscle use. pt pending EMS for transfer to [facility] at this time. 20g to the Left forearm and left wrist in tact. pt appears in NAD upon exiting trauma room a

## 2023-04-27 NOTE — ED PROVIDER NOTE - PROGRESS NOTE DETAILS
Uri EM/IM PGY5: Attending Dasha called by radiologist regarding dissection seen from ascending aorta to subclavian artery. CTA chest and abdomen ordered for further eval. Spoke with cardiac surgery PA on call at [facility] for consult, will speak to attending on call and call me back. Contacted pt's CT surgeon Dr. Pierre whom is familiar with pt as he just saw him in the office yesterday; states pt was not confused/obtunded/lethargic when he was evaluated in office. Discussed CT findings with Dr. Pierre whom confirms that pt does actually have endoleak which they are planning to repair. Either way, would like pt transferred to [facility] CT ICU bed instead of [facility]. Spoke with transfer center and arranged for same. Will update transfer order to [facility] instead.  -Dr. Lou Received patient on signout from Dr Crockett stable pending transfer to telemetry For possible new aortic dissection unclear if it is new or old.  Noted to have a elevation in blood pressure the last reading was 144/97 with a heart rate 66, prior to that was 139/86 with a heart rate of 68, and at 730 the pressure was 141/87 with a heart rate of 68..  We will start esmolol and nicardipine drip for close HR and BP control. Spoke w transfer center  to update. Transfer within  the hour. pt picked up by EMS, OFF the nicardipine drip BP less than 120, continued to have esmolol drip.  Hand off given to EMS.

## 2023-04-27 NOTE — PROGRESS NOTE ADULT - SUBJECTIVE AND OBJECTIVE BOX
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 Recent admission 3/20-4/14 during which patient underwent replacement of transverse aortic arch, second stage thoracic endovascular aortic repair, aorto-axillary bypass, AV replacement (bio 23mm), and CABG x 1 (SVG-RCA) EF 75% ([facility], 3/20). Post op cb lactic acidosis, severe cardiogenic and vasogenic shock, TREY requiring CVVHD and temporary dialysis requirement. Patient presented to [facility] ED 4/27 for syncope vs seizure episode at home, falling onto back of head. Stroke code was called and imaging of head and neck was grossly unremarkable however                 FAMILY HISTORY:  No pertinent family history in first degree relatives  PAST MEDICAL & SURGICAL HISTORY:  HTN (hypertension)  Aortic dissectio  CAD (coronary artery disease)  Seizure disorder  S/P aortic bifurcation bypass graft  S/P CABG x 1        Patient is a 53y old  Male who presents with a chief complaint of     14 system review was unremarkable    Vital signs, hemodynamic and respiratory parameters were reviewed from the bedside nursing flowsheet.  ICU Vital Signs Last 24 Hrs  T(C): --  T(F): --  HR: 71 (27 Apr 2023 13:15) (71 - 72)  BP: 113/69 (27 Apr 2023 13:15) (113/69 - 114/73)  BP(mean): 85 (27 Apr 2023 13:15) (85 - 90)  ABP: --  ABP(mean): --  RR: 18 (27 Apr 2023 13:15) (18 - 20)  SpO2: 100% (27 Apr 2023 13:15) (100% - 100%)    O2 Parameters below as of 27 Apr 2023 13:15  Patient On (Oxygen Delivery Method): room air          Adult Advanced Hemodynamics Last 24 Hrs  CVP(mm Hg): --  CVP(cm H2O): --  CO: --  CI: --  PA: --  PA(mean): --  PCWP: --  SVR: --  SVRI: --  PVR: --  PVRI: --,     Intake and output was reviewed and the fluid balance was calculated  Daily     Daily   I&O's Summary    27 Apr 2023 07:01  -  27 Apr 2023 13:34  --------------------------------------------------------  IN: 0 mL / OUT: 30 mL / NET: -30 mL        All lines and drain sites were assessed  Glycemic trend was reviewedCAPILLARY BLOOD GLUCOSE          Neuro:  HEENT:  Heart:  Lungs:  Abdomen:  Extremities:    Lines:    Tubes:      labs            The current medications were reviewed   MEDICATIONS  (STANDING):    MEDICATIONS  (PRN):      Assessment/Plan:         s/p cardiac surgery    Acute systolic and diastolic heart failure evidenced by SOB and parenchymal infiltrates; will treat with diuresis    Cardiogenic shock on ionotropy    Vasogenic shock due to hypotension in the cticu , will keep on pressors    Hypovolemic shock - > 20% intravascular depletion will replete volume    Acute blood loss anemia with relative hypotension treated with > 1 unit PC    Acute respiratory failure ruled in due to hypoxemia, O2 sats < 91% on RA treated with HFNC    Acute respiratory failure ruled in due to hypercapnea on abg will treat with mechanical ventilation    Acute respiratory failure ruled in due to prolonged mechanical ventilation > 24 hrs on the vent due to failure to wean due to weak respiratory mechanics    Toxic metabolic encephalopathy ; sundowning due to anesthesia pain medications    Acidosis evidenced by anion gap and negative base excess    Acute kidney injury - creatinine > 0.3 due to combined prerenal and intrarenal factors can presume ATN    ESRD    Acute venu-operative ischemic stroke    Moderate protein calorie malutrition    Diet as tolerated  Replete lytes prn  Monitor CT output  GI/DVT PPX  Bowel Regimen  Pain control  OOB with PT      Close hemodynamic, ventilatory and drain monitoring and management per post op routine  Monitor for arrhythmias and monitor parameters for organ perfusion  Beta blockade not administered due to hemodynamic instability and bradycardia  Monitor neurologic status  Head of the bed should remain elevated to 45 deg   Chest PT and IS will be encouraged  Monitor adequacy of oxygenation and ventilation and attempt to wean oxygen  Antibiotic regimen will be tailored to the clinical, laboratory and microbiologic data  Nutritional goals will be met using po eventually, ensure adequate caloric intake and monitor the same  Stress ulcer and VTE prophylaxis will be achieved    Glycemic control is satisfactory  Electrolytes have been repleted as necessary and wound care has been carried out   Pain control has been achieved.   Aggressive physical therapy and early mobility and ambulation goals will be met   The family was updated about the course and plan.    CRITICAL CARE TIME personally provided by me  in evaluation and management, reassessments, review and interpretation of labs and x-rays, ventilator and hemodynamic management, formulating a plan and coordinating care: ___105____ MIN.  Time does not include procedural time.    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 Recent admission 3/20-4/14 during which patient underwent replacement of transverse aortic arch, second stage thoracic endovascular aortic repair, aorto-axillary bypass, AV replacement (bio 23mm), and CABG x 1 (SVG-RCA) EF 75% ([facility], 3/20). Post op cb lactic acidosis, severe cardiogenic and vasogenic shock, TREY requiring CVVHD and temporary dialysis requirement. Patient presented to [facility] ED 4/27 for syncope vs seizure episode at home, falling onto back of head. Stroke code was called and imaging of head and neck was grossly unremarkable however dissection from EVAR to brachiocephalic/R subclavian artery and prox portion of R carotid and L common iliac with questionable endoleak. Patient was started on esmolol and ER team contacted Dr. Pierre who reported he saw patient in clinic 4/26 and was fine. In ED patient was lethargic with AMS and RUE weakness and difficulty providing history. Decision made to tx to [facility] for further workup. Of note patient saw his neurologist (Dr. Delilah Hernandez, [facility] [phone]) 4/26 with facial tremors, upper extremity tremors, had asterixis and tongue fasciculations. Requested ammonia level however patient refused to stay given his cardiology followup. Depakote level 65 on 750mg BID dosing. Was instructed to go to 1000BID and repeat levels tmrw. Describes fall backward as focal seizure typical of patient. Recommends loading with 500mg IV depakote and following up lab values.       FAMILY HISTORY:  No pertinent family history in first degree relatives  PAST MEDICAL & SURGICAL HISTORY:  HTN (hypertension)  Aortic dissectio  CAD (coronary artery disease)  Seizure disorder  S/P aortic bifurcation bypass graft  S/P CABG x 1        Patient is a 53y old  Male who presents with a chief complaint of     14 system review was unremarkable    Vital signs, hemodynamic and respiratory parameters were reviewed from the bedside nursing flowsheet.  ICU Vital Signs Last 24 Hrs  T(C): --  T(F): --  HR: 71 (27 Apr 2023 13:15) (71 - 72)  BP: 113/69 (27 Apr 2023 13:15) (113/69 - 114/73)  BP(mean): 85 (27 Apr 2023 13:15) (85 - 90)  ABP: --  ABP(mean): --  RR: 18 (27 Apr 2023 13:15) (18 - 20)  SpO2: 100% (27 Apr 2023 13:15) (100% - 100%)    O2 Parameters below as of 27 Apr 2023 13:15  Patient On (Oxygen Delivery Method): room air          Adult Advanced Hemodynamics Last 24 Hrs  CVP(mm Hg): --  CVP(cm H2O): --  CO: --  CI: --  PA: --  PA(mean): --  PCWP: --  SVR: --  SVRI: --  PVR: --  PVRI: --,     Intake and output was reviewed and the fluid balance was calculated  Daily     Daily   I&O's Summary    27 Apr 2023 07:01  -  27 Apr 2023 13:34  --------------------------------------------------------  IN: 0 mL / OUT: 30 mL / NET: -30 mL        All lines and drain sites were assessed  Glycemic trend was reviewed      Neuro: able to verbalize and communicate and answer questions but slow to respond, moving all extremities though strength appears diminished throughout  HEENT: mmm  Heart: s1 s2  Lungs: clear bl  Abdomen: soft nt nd  Extremities: muscle wasting        labs            The current medications were reviewed   MEDICATIONS  (STANDING):    MEDICATIONS  (PRN):      Assessment/Plan:  53yr old male with PMH HTN, type A dissection sp Dacron grafts and AV resuspension (2013), CAD sp CABG x 1 ([facility], 5/2013, SVG-RCA), seizures, admitted for surgical mgmt of progression of aneurysmal disease. Recent admission 3/20-4/14 during which patient underwent replacement of transverse aortic arch, second stage thoracic endovascular aortic repair, aorto-axillary bypass, AV replacement (bio 23mm), and CABG x 1 (SVG-RCA) EF 75% ([facility], 3/20). Post op cb lactic acidosis, severe cardiogenic and vasogenic shock, TREY requiring CVVHD and temporary dialysis requirement. Patient presented to [facility] ED 4/27 for syncope vs seizure episode at home, falling onto back of head. Stroke code was called and imaging of head and neck was grossly unremarkable however dissection from EVAR to brachiocephalic/R subclavian artery and prox portion of R carotid and L common iliac with questionable endoleak. Patient was started on esmolol and ER team contacted Dr. Pierre who reported he saw patient in clinic 4/26 and was fine. In ED patient was lethargic with AMS and RUE weakness and difficulty providing history. Decision made to tx to [facility] for further workup. Of note patient saw his neurologist (Dr. Delilah Hernandez, [facility] [phone]) 4/26 with facial tremors, upper extremity tremors, had asterixis and tongue fasciculations. Requested ammonia level however patient refused to stay given his cardiology followup. Depakote level 65 on 750mg BID dosing. Was instructed to go to 1000BID and repeat levels tmrw. Describes fall backward as focal seizure typical of patient. Recommends loading with 500mg IV depakote and following up lab values.     Encephalopathy unclear source  AMS workup  Labs including AED levels  Depakote 500mg IV now  Increased dose to 1000mg BID  Fu labs  Fu CXR, infectious workup  On esmolol for BP mgmt goal systolic <140  Epilepsy consult  EEG  Replete lytes prn  GI/DVT PPX  Bowel Regimen  Pain control  OOB with PT  Close hemodynamic, ventilatory and drain monitoring and management per post op routine  Monitor for arrhythmias and monitor parameters for organ perfusion  Monitor neurologic status  Head of the bed should remain elevated to 45 deg   Chest PT and IS will be encouraged  Monitor adequacy of oxygenation and ventilation and attempt to wean oxygen  Antibiotic regimen will be tailored to the clinical, laboratory and microbiologic data  Nutritional goals will be met using po eventually, ensure adequate caloric intake and monitor the same  Stress ulcer and VTE prophylaxis will be achieved    Glycemic control is satisfactory  Electrolytes have been repleted as necessary and wound care has been carried out   Pain control has been achieved.   Aggressive physical therapy and early mobility and ambulation goals will be met   The family was updated about the course and plan.    CRITICAL CARE TIME personally provided by me  in evaluation and management, reassessments, review and interpretation of labs and x-rays, ventilator and hemodynamic management, formulating a plan and coordinating care: ___105____ MIN.  Time does not include procedural time.    CTICU ATTENDING     					  Alexx Brooks MD

## 2023-04-27 NOTE — CONSULT NOTE ADULT - ASSESSMENT
53 year-old male with PMH of HTN, type A dissection sp Dacron grafts and AV resuspension (2013), CAD sp CABG x 1 ([facility], 5/2013, SVG-RCA), recent admission on 3/20 - 4/14 for replacement of transverse aortic arch, second stage thoracic endovascular aortic repair, aorto-axillary bypass, AV replacement (bio 23mm), and CABG x 1 (SVG-RCA) EF 75% ([facility], 3/20). seizures (depakote and vimpat), c/b lactic acidosis, severe cardiogenic and vasogenic shock, TREY requiring CVVHD and temporary dialysis requirement who was transferred from [facility] to [facility] on 4/27/23 for surgical management of progression of aneurysmal disease.     Epilepsy consulted for concern for seizure that is unclear given inconsistent information related to the fall. Differential diagnosis includes seizure vs syncope. Was found to have RUE weakness associated w/ pain. Also most likely tongue fasciculations, asterixis are related to metabolic derangement (TREY), and hand tremors are most likely an adverse reaction of depakote.     Recommendations:  - Please place vEEG monitoring  - Continue Vimpat 100mg Q12hrs (IV while patient is somnolent)  - Continue Depakote 1000mg Q12hrs - recent increase by outpatient neurologist (IV while patient is somnolent)   - CT cervical spine w/o contrast due to R arm weakness  - R arm Xray  - Maintain seizure and fall precautions  53 year-old male with PMH of HTN, type A dissection sp Dacron grafts and AV resuspension (2013), CAD sp CABG x 1 ([facility], 5/2013, SVG-RCA), recent admission on 3/20 - 4/14 for replacement of transverse aortic arch, second stage thoracic endovascular aortic repair, aorto-axillary bypass, AV replacement (bio 23mm), and CABG x 1 (SVG-RCA) EF 75% ([facility], 3/20). seizures (depakote and vimpat), c/b lactic acidosis, severe cardiogenic and vasogenic shock, TREY requiring CVVHD and temporary dialysis requirement who was transferred from [facility] to [facility] on 4/27/23 for surgical management of progression of aneurysmal disease.     Epilepsy consulted for concern for seizure that is unclear given inconsistent information related to the fall. Differential diagnosis includes seizure vs syncope. Was found to have RUE weakness associated w/ pain. Also most likely tongue fasciculations, asterixis are related to metabolic derangement (TREY),and possibly Depakote     Recommendations:  - Please place vEEG monitoring  - Continue Vimpat 100mg Q12hrs (IV while patient is somnolent)  - Continue Depakote 1000mg Q12hrs - recent increase by outpatient neurologist (IV while patient is somnolent)   - CT cervical spine w/o contrast due to neck pain and  R arm weakness  - R arm Xray  - Maintain seizure and fall precautions

## 2023-04-27 NOTE — H&P ADULT - ASSESSMENT
Pt is a 53yr old male with PMH HTN, type A dissection sp Dacron grafts and AV resuspension (2013), CAD sp CABG x 1 ([facility], 5/2013, SVG-RCA), seizures, admitted for surgical mgmt of progression of aneurysmal disease. Recent admission 3/20-4/14 during which patient underwent replacement of transverse aortic arch, second stage thoracic endovascular aortic repair, aorto-axillary bypass, AV replacement (bio 23mm), and CABG x 1 (SVG-RCA) EF 75% ([facility], 3/20). Post op cb lactic acidosis, severe cardiogenic and vasogenic shock, TREY requiring CVVHD and temporary dialysis requirement. Patient presented to [facility] ED 4/27 for syncope vs seizure episode at home, falling onto back of head. Stroke code was called and imaging of head and neck was grossly unremarkable however dissection from EVAR to brachiocephalic/R subclavian artery and prox portion of R carotid and L common iliac with questionable endoleak. Patient was started on esmolol and ER team contacted Dr. Pierre who reported he saw patient in clinic 4/26 and was fine. In ED patient was lethargic with AMS and RUE weakness and difficulty providing history. Decision made to tx to [facility] for further workup. Of note patient saw his neurologist (Dr. Delilah Hernandez, [facility] [phone]) 4/26 with facial tremors, upper extremity tremors, had asterixis and tongue fasciculations. Requested ammonia level however patient refused to stay given his cardiology followup. Depakote level 65 on 750mg BID dosing. Was instructed to go to 1000BID and repeat levels tmrw. Describes fall backward as focal seizure typical of patient.      Plan:  Problem 1: Altered mental status  - Syncopal episode vs seizure  - 500mg depakote load, recheck level at 2200 & plan for 1000mg BID dosing  - Vimpat 100mg q12hr  - q1hr neuro checks   - Appreciate neuro input  - CT cervical spine   - EEG pending      Problem 2: HTN  - Initially presented on esmolol gtt, presently on hold   - Goal SBP < 140   - Plan to resume po anti-htn regimen when able to take PO       Problem 3: Hx ype A dissection sp Dacron grafts and AV resuspension (2013), CAD sp CABG x 1 ([facility], 5/2013, SVG-RCA)  & 3/20/23:  replacement of transverse aortic arch, second stage thoracic endovascular aortic repair, aorto-axillary bypass, AV replacement (bio 23mm), and CABG x 1 (SVG-RCA) EF 75%   - CTA at       Problem 4:     Pt is a 53yr old male with PMH HTN, type A dissection sp Dacron grafts and AV resuspension (2013), CAD sp CABG x 1 ([facility], 5/2013, SVG-RCA), seizures, admitted for surgical mgmt of progression of aneurysmal disease. Recent admission 3/20-4/14 during which patient underwent replacement of transverse aortic arch, second stage thoracic endovascular aortic repair, aorto-axillary bypass, AV replacement (bio 23mm), and CABG x 1 (SVG-RCA) EF 75% ([facility], 3/20). Post op cb lactic acidosis, severe cardiogenic and vasogenic shock, TREY requiring CVVHD and temporary dialysis requirement. Patient presented to [facility] ED 4/27 for syncope vs seizure episode at home, falling onto back of head. Stroke code was called and imaging of head and neck was grossly unremarkable however dissection from EVAR to brachiocephalic/R subclavian artery and prox portion of R carotid and L common iliac with questionable endoleak. Patient was started on esmolol and ER team contacted Dr. Pierre who reported he saw patient in clinic 4/26 and was fine. In ED patient was lethargic with AMS and RUE weakness and difficulty providing history. Decision made to tx to [facility] for further workup. Of note patient saw his neurologist (Dr. Delilah Hernandez, [facility] [phone]) 4/26 with facial tremors, upper extremity tremors, had asterixis and tongue fasciculations. Requested ammonia level however patient refused to stay given his cardiology followup. Depakote level 65 on 750mg BID dosing. Was instructed to go to 1000BID and repeat levels tmrw. Describes fall backward as focal seizure typical of patient.      Plan:  Problem 1: Altered mental status  - Syncopal episode vs seizure  - 500mg depakote load, recheck level at 2200 & plan for 1000mg BID dosing  - Vimpat 100mg q12hr  - q1hr neuro checks   - Appreciate neuro input  - CT cervical spine   - EEG pending      Problem 2: HTN  - Initially presented on esmolol gtt, presently on hold   - Goal SBP < 140   - Plan to resume po anti-htn regimen when able to take PO       Problem 3: Hx ype A dissection sp Dacron grafts and AV resuspension (2013), CAD sp CABG x 1 ([facility], 5/2013, SVG-RCA)  & 3/20/23:  replacement of transverse aortic arch, second stage thoracic endovascular aortic repair, aorto-axillary bypass, AV replacement (bio 23mm), and CABG x 1 (SVG-RCA) EF 75%   - CTA at [facility] reviewed by Dr. Pierre, no acute findings or intervention at this time       Problem 4: TREY   - Strict I&O  - Cr on admission 2.33

## 2023-04-27 NOTE — H&P ADULT - HISTORY OF PRESENT ILLNESS
Pt is a 53yr old male with PMH HTN, type A dissection sp Dacron grafts and AV resuspension (2013), CAD sp CABG x 1 ([facility], 5/2013, SVG-RCA), seizures, admitted for surgical mgmt of progression of aneurysmal disease. Recent admission 3/20-4/14 during which patient underwent replacement of transverse aortic arch, second stage thoracic endovascular aortic repair, aorto-axillary bypass, AV replacement (bio 23mm), and CABG x 1 (SVG-RCA) EF 75% ([facility], 3/20). Post op cb lactic acidosis, severe cardiogenic and vasogenic shock, TREY requiring CVVHD and temporary dialysis requirement. Patient presented to [facility] ED 4/27 for syncope vs seizure episode at home, falling onto back of head. Stroke code was called and imaging of head and neck was grossly unremarkable however dissection from EVAR to brachiocephalic/R subclavian artery and prox portion of R carotid and L common iliac with questionable endoleak. Patient was started on esmolol and ER team contacted Dr. Pierre who reported he saw patient in clinic 4/26 and was fine. In ED patient was lethargic with AMS and RUE weakness and difficulty providing history. Decision made to tx to [facility] for further workup. Of note patient saw his neurologist (Dr. Delilah Hernandez, [facility] [phone]) 4/26 with facial tremors, upper extremity tremors, had asterixis and tongue fasciculations. Requested ammonia level however patient refused to stay given his cardiology followup. Depakote level 65 on 750mg BID dosing. Was instructed to go to 1000BID and repeat levels tmrw. Describes fall backward as focal seizure typical of patient.

## 2023-04-27 NOTE — CONSULT NOTE ADULT - ASSESSMENT
53M RH w/ epilepsy, abdominal aortic repair (3/20/23) presents with intermittent confusion and falls. Per wife at bedside, pt had a fall yesterday and today at 00:00 4/27/23. Pt was standing up and fell backwards, no LOC or seizure-like activity noted. Pt was diaphoretic and more confused after the fall. Pt was noted to be more fatigued and intermittently confused throughout the day, ex. asking for tape under the bed that was not there. CTH neg. CTA w/ dissection     Impression: intermittent confusion, diaphoresis likely in setting of aortic dissection, anemia. Low suspicion for acute ischemic stroke, no intracranial dissection    Recommendations:   [] CT surgery consulted for aortic dissection  [] toxic/metabolic/infectious workup per primary team  [] c/w home vimpat 100mg BID and depakote 1000mg BID for known epilepsy  [] when ready for discharge, outpatient f/u with his neurologist Dr. Delilah Hernandez    Case to be discussed to attending 53M RH w/ epilepsy, abdominal aortic aneurysm (s/p EVAR 3/20/23) presents with intermittent confusion and falls. Per wife at bedside, pt had a fall yesterday and today at 00:00 4/27/23. Pt was standing up and fell backwards, no LOC or seizure-like activity noted. Pt was diaphoretic and more confused after the fall. Pt was noted to be more fatigued and intermittently confused throughout the day, ex. asking for tape under the bed that was not there. CTH neg. CTA w/ dissection     Impression: intermittent confusion, diaphoresis likely in setting of aortic dissection, anemia. Low suspicion for acute ischemic stroke, no intracranial dissection    Recommendations:   [] CT surgery consulted for aortic dissection  [] toxic/metabolic/infectious workup per primary team  [] c/w home vimpat 100mg BID and depakote 1000mg BID for known epilepsy  [] when ready for discharge, outpatient f/u with his neurologist Dr. Delilah Hernandez    Case to be discussed to attending 53M RH w/ epilepsy, abdominal aortic aneurysm (s/p EVAR 3/20/23) presents with intermittent confusion and falls. Per wife at bedside, pt had a fall yesterday and today at 00:00 4/27/23. Pt was standing up and fell backwards, no LOC or seizure-like activity noted. Pt was diaphoretic and more confused after the fall. Pt was noted to be more fatigued and intermittently confused throughout the day, ex. asking for tape under the bed that was not there. CTH neg. CTA w/ dissection seen from ascending aorta to R subclavian artery    Impression: intermittent confusion, diaphoresis likely in setting of aortic dissection, anemia. Low suspicion for acute ischemic stroke, no intracranial dissection    Recommendations:   [] CT surgery consulted for aortic dissection  [] toxic/metabolic/infectious workup per primary team  [] c/w home vimpat 100mg BID and depakote 1000mg BID for known epilepsy  [] when ready for discharge, outpatient f/u with his neurologist Dr. Delilah Hernandez    Case discussed with telestroke attending Randal Caceres  Case to be discussed to attending in AM 53M RH w/ epilepsy, abdominal aortic aneurysm (s/p EVAR 3/20/23),HTN, GERD presents with intermittent confusion and falls. Per wife at bedside, pt had a fall yesterday and today at 00:00 4/27/23. Pt was standing up and fell backwards, no LOC or seizure-like activity noted. Pt was diaphoretic and more confused after the fall. Pt was noted to be more fatigued and intermittently confused throughout the day, ex. asking for tape under the bed that was not there. CTH neg. CTA w/ aortic dissection into the brachiocephalic trunk, right subclavian artery and proximal common carotid artery.     Impression: intermittent confusion, diaphoresis likely in setting of aortic dissection, anemia. Low suspicion for acute ischemic stroke, no intracranial dissection on CTA    Recommendations:   [] CT surgery consulted for aortic dissection  [] toxic/metabolic/infectious workup per primary team  [] c/w home vimpat 100mg BID and depakote 1000mg BID for known epilepsy  [] when ready for discharge, outpatient f/u with his neurologist Dr. Delilah Hernandez    Case discussed with telestroke attending Dr. Caceres  Case to be discussed to attending in AM 53M RH w/ epilepsy, abdominal aortic aneurysm (s/p EVAR 3/20/23),HTN, GERD presents with intermittent confusion and falls. Per wife at bedside, pt had a fall yesterday and today at 00:00 4/27/23. Pt was standing up and fell backwards, no LOC or seizure-like activity noted. Pt was diaphoretic and more confused after the fall. Pt was noted to be more fatigued and intermittently confused throughout the day, ex. asking for tape under the bed that was not there. CTH neg. CTA w/ aortic dissection into the brachiocephalic trunk, right subclavian artery and proximal common carotid artery.     Impression: intermittent confusion, diaphoresis likely in setting of aortic dissection, anemia. Low suspicion for acute ischemic stroke, no intracranial dissection on CTA    Recommendations:   [] CT surgery consulted for aortic dissection  [] toxic/metabolic/infectious workup per primary team  [] c/w home vimpat 100mg BID and depakote 1000mg BID for known epilepsy  [] check EEG   [] when ready for discharge, outpatient f/u with his neurologist Dr. Delilah Hernandez    Case discussed with telestroke attending Dr. Caceres  Case to be discussed to attending in AM

## 2023-04-28 LAB
A1C WITH ESTIMATED AVERAGE GLUCOSE RESULT: 5.2 % — SIGNIFICANT CHANGE UP (ref 4–5.6)
ALBUMIN SERPL ELPH-MCNC: 2 G/DL — LOW (ref 3.3–5)
ALBUMIN SERPL ELPH-MCNC: 2.6 G/DL — LOW (ref 3.3–5)
ALP SERPL-CCNC: 58 U/L — SIGNIFICANT CHANGE UP (ref 40–120)
ALP SERPL-CCNC: 75 U/L — SIGNIFICANT CHANGE UP (ref 40–120)
ALT FLD-CCNC: <5 U/L — LOW (ref 10–45)
ALT FLD-CCNC: <5 U/L — LOW (ref 10–45)
ANION GAP SERPL CALC-SCNC: 6 MMOL/L — SIGNIFICANT CHANGE UP (ref 5–17)
ANION GAP SERPL CALC-SCNC: 8 MMOL/L — SIGNIFICANT CHANGE UP (ref 5–17)
APTT BLD: 29.6 SEC — SIGNIFICANT CHANGE UP (ref 27.5–35.5)
APTT BLD: 30.7 SEC — SIGNIFICANT CHANGE UP (ref 27.5–35.5)
AST SERPL-CCNC: 11 U/L — SIGNIFICANT CHANGE UP (ref 10–40)
AST SERPL-CCNC: 12 U/L — SIGNIFICANT CHANGE UP (ref 10–40)
BILIRUB SERPL-MCNC: 0.2 MG/DL — SIGNIFICANT CHANGE UP (ref 0.2–1.2)
BILIRUB SERPL-MCNC: 0.6 MG/DL — SIGNIFICANT CHANGE UP (ref 0.2–1.2)
BLD GP AB SCN SERPL QL: NEGATIVE — SIGNIFICANT CHANGE UP
BUN SERPL-MCNC: 23 MG/DL — SIGNIFICANT CHANGE UP (ref 7–23)
BUN SERPL-MCNC: 25 MG/DL — HIGH (ref 7–23)
CALCIUM SERPL-MCNC: 7 MG/DL — LOW (ref 8.4–10.5)
CALCIUM SERPL-MCNC: 8.7 MG/DL — SIGNIFICANT CHANGE UP (ref 8.4–10.5)
CHLORIDE SERPL-SCNC: 97 MMOL/L — SIGNIFICANT CHANGE UP (ref 96–108)
CHLORIDE SERPL-SCNC: 98 MMOL/L — SIGNIFICANT CHANGE UP (ref 96–108)
CO2 SERPL-SCNC: 26 MMOL/L — SIGNIFICANT CHANGE UP (ref 22–31)
CO2 SERPL-SCNC: 32 MMOL/L — HIGH (ref 22–31)
CREAT SERPL-MCNC: 1.86 MG/DL — HIGH (ref 0.5–1.3)
CREAT SERPL-MCNC: 1.99 MG/DL — HIGH (ref 0.5–1.3)
EGFR: 39 ML/MIN/1.73M2 — LOW
EGFR: 42 ML/MIN/1.73M2 — LOW
ESTIMATED AVERAGE GLUCOSE: 103 MG/DL — SIGNIFICANT CHANGE UP (ref 68–114)
GLUCOSE BLDC GLUCOMTR-MCNC: 108 MG/DL — HIGH (ref 70–99)
GLUCOSE BLDC GLUCOMTR-MCNC: 85 MG/DL — SIGNIFICANT CHANGE UP (ref 70–99)
GLUCOSE BLDC GLUCOMTR-MCNC: 95 MG/DL — SIGNIFICANT CHANGE UP (ref 70–99)
GLUCOSE BLDC GLUCOMTR-MCNC: 97 MG/DL — SIGNIFICANT CHANGE UP (ref 70–99)
GLUCOSE SERPL-MCNC: 199 MG/DL — HIGH (ref 70–99)
GLUCOSE SERPL-MCNC: 88 MG/DL — SIGNIFICANT CHANGE UP (ref 70–99)
HCT VFR BLD CALC: 18.6 % — CRITICAL LOW (ref 39–50)
HCT VFR BLD CALC: 21.9 % — LOW (ref 39–50)
HCT VFR BLD CALC: 29.5 % — LOW (ref 39–50)
HGB BLD-MCNC: 5.8 G/DL — CRITICAL LOW (ref 13–17)
HGB BLD-MCNC: 6.8 G/DL — CRITICAL LOW (ref 13–17)
HGB BLD-MCNC: 9.9 G/DL — LOW (ref 13–17)
INR BLD: 1.19 — HIGH (ref 0.88–1.16)
INR BLD: 1.27 — HIGH (ref 0.88–1.16)
MAGNESIUM SERPL-MCNC: 1.7 MG/DL — SIGNIFICANT CHANGE UP (ref 1.6–2.6)
MAGNESIUM SERPL-MCNC: 2 MG/DL — SIGNIFICANT CHANGE UP (ref 1.6–2.6)
MCHC RBC-ENTMCNC: 29.6 PG — SIGNIFICANT CHANGE UP (ref 27–34)
MCHC RBC-ENTMCNC: 29.7 PG — SIGNIFICANT CHANGE UP (ref 27–34)
MCHC RBC-ENTMCNC: 30.8 PG — SIGNIFICANT CHANGE UP (ref 27–34)
MCHC RBC-ENTMCNC: 31.1 GM/DL — LOW (ref 32–36)
MCHC RBC-ENTMCNC: 31.2 GM/DL — LOW (ref 32–36)
MCHC RBC-ENTMCNC: 33.6 GM/DL — SIGNIFICANT CHANGE UP (ref 32–36)
MCV RBC AUTO: 91.9 FL — SIGNIFICANT CHANGE UP (ref 80–100)
MCV RBC AUTO: 95.2 FL — SIGNIFICANT CHANGE UP (ref 80–100)
MCV RBC AUTO: 95.4 FL — SIGNIFICANT CHANGE UP (ref 80–100)
NRBC # BLD: 0 /100 WBCS — SIGNIFICANT CHANGE UP (ref 0–0)
PHOSPHATE SERPL-MCNC: 3.8 MG/DL — SIGNIFICANT CHANGE UP (ref 2.5–4.5)
PHOSPHATE SERPL-MCNC: 4.1 MG/DL — SIGNIFICANT CHANGE UP (ref 2.5–4.5)
PLATELET # BLD AUTO: 267 K/UL — SIGNIFICANT CHANGE UP (ref 150–400)
PLATELET # BLD AUTO: 269 K/UL — SIGNIFICANT CHANGE UP (ref 150–400)
PLATELET # BLD AUTO: 313 K/UL — SIGNIFICANT CHANGE UP (ref 150–400)
POTASSIUM SERPL-MCNC: 3.7 MMOL/L — SIGNIFICANT CHANGE UP (ref 3.5–5.3)
POTASSIUM SERPL-MCNC: 4.4 MMOL/L — SIGNIFICANT CHANGE UP (ref 3.5–5.3)
POTASSIUM SERPL-SCNC: 3.7 MMOL/L — SIGNIFICANT CHANGE UP (ref 3.5–5.3)
POTASSIUM SERPL-SCNC: 4.4 MMOL/L — SIGNIFICANT CHANGE UP (ref 3.5–5.3)
PROT SERPL-MCNC: 4.8 G/DL — LOW (ref 6–8.3)
PROT SERPL-MCNC: 6.3 G/DL — SIGNIFICANT CHANGE UP (ref 6–8.3)
PROTHROM AB SERPL-ACNC: 14.2 SEC — HIGH (ref 10.5–13.4)
PROTHROM AB SERPL-ACNC: 15.1 SEC — HIGH (ref 10.5–13.4)
RBC # BLD: 1.95 M/UL — LOW (ref 4.2–5.8)
RBC # BLD: 2.3 M/UL — LOW (ref 4.2–5.8)
RBC # BLD: 3.21 M/UL — LOW (ref 4.2–5.8)
RBC # FLD: 14.4 % — SIGNIFICANT CHANGE UP (ref 10.3–14.5)
RBC # FLD: 14.4 % — SIGNIFICANT CHANGE UP (ref 10.3–14.5)
RBC # FLD: 14.6 % — HIGH (ref 10.3–14.5)
RH IG SCN BLD-IMP: POSITIVE — SIGNIFICANT CHANGE UP
SODIUM SERPL-SCNC: 132 MMOL/L — LOW (ref 135–145)
SODIUM SERPL-SCNC: 135 MMOL/L — SIGNIFICANT CHANGE UP (ref 135–145)
WBC # BLD: 12.14 K/UL — HIGH (ref 3.8–10.5)
WBC # BLD: 7.79 K/UL — SIGNIFICANT CHANGE UP (ref 3.8–10.5)
WBC # BLD: 8.42 K/UL — SIGNIFICANT CHANGE UP (ref 3.8–10.5)
WBC # FLD AUTO: 12.14 K/UL — HIGH (ref 3.8–10.5)
WBC # FLD AUTO: 7.79 K/UL — SIGNIFICANT CHANGE UP (ref 3.8–10.5)
WBC # FLD AUTO: 8.42 K/UL — SIGNIFICANT CHANGE UP (ref 3.8–10.5)

## 2023-04-28 PROCEDURE — 99233 SBSQ HOSP IP/OBS HIGH 50: CPT

## 2023-04-28 PROCEDURE — 99291 CRITICAL CARE FIRST HOUR: CPT

## 2023-04-28 PROCEDURE — 95720 EEG PHY/QHP EA INCR W/VEEG: CPT

## 2023-04-28 PROCEDURE — 72125 CT NECK SPINE W/O DYE: CPT | Mod: 26

## 2023-04-28 RX ADMIN — Medication 650 MILLIGRAM(S): at 17:14

## 2023-04-28 RX ADMIN — PANTOPRAZOLE SODIUM 40 MILLIGRAM(S): 20 TABLET, DELAYED RELEASE ORAL at 06:10

## 2023-04-28 RX ADMIN — Medication 650 MILLIGRAM(S): at 10:22

## 2023-04-28 RX ADMIN — SODIUM CHLORIDE 3 MILLILITER(S): 9 INJECTION INTRAMUSCULAR; INTRAVENOUS; SUBCUTANEOUS at 05:33

## 2023-04-28 RX ADMIN — POLYETHYLENE GLYCOL 3350 17 GRAM(S): 17 POWDER, FOR SOLUTION ORAL at 12:45

## 2023-04-28 RX ADMIN — Medication 60 MILLIGRAM(S): at 21:20

## 2023-04-28 RX ADMIN — Medication 650 MILLIGRAM(S): at 15:25

## 2023-04-28 RX ADMIN — HEPARIN SODIUM 5000 UNIT(S): 5000 INJECTION INTRAVENOUS; SUBCUTANEOUS at 14:46

## 2023-04-28 RX ADMIN — HEPARIN SODIUM 5000 UNIT(S): 5000 INJECTION INTRAVENOUS; SUBCUTANEOUS at 21:20

## 2023-04-28 RX ADMIN — SODIUM CHLORIDE 3 MILLILITER(S): 9 INJECTION INTRAMUSCULAR; INTRAVENOUS; SUBCUTANEOUS at 21:21

## 2023-04-28 RX ADMIN — SODIUM CHLORIDE 3 MILLILITER(S): 9 INJECTION INTRAMUSCULAR; INTRAVENOUS; SUBCUTANEOUS at 15:16

## 2023-04-28 RX ADMIN — Medication 650 MILLIGRAM(S): at 09:23

## 2023-04-28 RX ADMIN — HEPARIN SODIUM 5000 UNIT(S): 5000 INJECTION INTRAVENOUS; SUBCUTANEOUS at 05:52

## 2023-04-28 RX ADMIN — LACOSAMIDE 120 MILLIGRAM(S): 50 TABLET ORAL at 05:52

## 2023-04-28 RX ADMIN — Medication 650 MILLIGRAM(S): at 22:20

## 2023-04-28 RX ADMIN — Medication 60 MILLIGRAM(S): at 09:41

## 2023-04-28 RX ADMIN — LACOSAMIDE 120 MILLIGRAM(S): 50 TABLET ORAL at 17:47

## 2023-04-28 RX ADMIN — ATORVASTATIN CALCIUM 20 MILLIGRAM(S): 80 TABLET, FILM COATED ORAL at 21:20

## 2023-04-28 RX ADMIN — Medication 650 MILLIGRAM(S): at 21:20

## 2023-04-28 RX ADMIN — Medication 81 MILLIGRAM(S): at 12:45

## 2023-04-28 NOTE — PROGRESS NOTE ADULT - SUBJECTIVE AND OBJECTIVE BOX
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 Recent admission 3/20-4/14 during which patient underwent replacement of transverse aortic arch, second stage thoracic endovascular aortic repair, aorto-axillary bypass, AV replacement (bio 23mm), and CABG x 1 (SVG-RCA) EF 75% ([facility], 3/20). Post op cb lactic acidosis, severe cardiogenic and vasogenic shock, TREY requiring CVVHD and temporary dialysis requirement. Patient presented to [facility] ED 4/27 for syncope vs seizure episode at home, falling onto back of head. Stroke code was called and imaging of head and neck was grossly unremarkable however dissection from EVAR to brachiocephalic/R subclavian artery and prox portion of R carotid and L common iliac with questionable endoleak. Patient was started on esmolol and ER team contacted Dr. Pierre who reported he saw patient in clinic 4/26 and was fine. In ED patient was lethargic with AMS and RUE weakness and difficulty providing history. Decision made to tx to [facility] for further workup. Of note patient saw his neurologist (Dr. Delilah Hernandez, [facility] [phone]) 4/26 with facial tremors, upper extremity tremors, had asterixis and tongue fasciculations. Requested ammonia level however patient refused to stay given his cardiology followup. Depakote level 65 on 750mg BID dosing. Was instructed to go to 1000BID and repeat levels tmrw. Describes fall backward as focal seizure typical of patient. Recommends loading with 500mg IV depakote and following up lab values. 4/28 Neurology recommending dc EEG.        FAMILY HISTORY:  No pertinent family history in first degree relatives  PAST MEDICAL & SURGICAL HISTORY:  HTN (hypertension)  Aortic dissection  CAD (coronary artery disease)  Seizure disorder  S/P aortic bifurcation bypass graft  S/P CABG x 1        Patient is a 53y old  Male who presents with a chief complaint of Syncope.    14 system review was unremarkable    Vital signs, hemodynamic and respiratory parameters were reviewed from the bedside nursing flowsheet.  ICU Vital Signs Last 24 Hrs  T(C): 36.1 (28 Apr 2023 18:05), Max: 37.4 (28 Apr 2023 13:00)  T(F): 96.9 (28 Apr 2023 18:05), Max: 99.4 (28 Apr 2023 13:00)  HR: 87 (28 Apr 2023 20:00) (76 - 104)  BP: 150/87 (28 Apr 2023 20:00) (114/66 - 152/85)  BP(mean): 113 (28 Apr 2023 20:00) (85 - 116)  ABP: --  ABP(mean): --  RR: 20 (28 Apr 2023 20:00) (17 - 20)  SpO2: 98% (28 Apr 2023 20:00) (96% - 99%)    O2 Parameters below as of 28 Apr 2023 20:00  Patient On (Oxygen Delivery Method): room air          Adult Advanced Hemodynamics Last 24 Hrs  CVP(mm Hg): --  CVP(cm H2O): --  CO: --  CI: --  PA: --  PA(mean): --  PCWP: --  SVR: --  SVRI: --  PVR: --  PVRI: --,     Intake and output was reviewed and the fluid balance was calculated  Daily     Daily   I&O's Summary    27 Apr 2023 07:01  -  28 Apr 2023 07:00  --------------------------------------------------------  IN: 1539.4 mL / OUT: 865 mL / NET: 674.4 mL    28 Apr 2023 07:01  -  28 Apr 2023 21:16  --------------------------------------------------------  IN: 1120 mL / OUT: 535 mL / NET: 585 mL        All lines and drain sites were assessed  Glycemic trend was reviewedCAPILLARY BLOOD GLUCOSE      POCT Blood Glucose.: 85 mg/dL (28 Apr 2023 16:42)      Neuro: able to verbalize and communicate and answer questions but slow to respond, moving all extremities though strength appears diminished throughout  HEENT: mmm  Heart: s1 s2  Lungs: clear bl  Abdomen: soft nt nd  Extremities: muscle wasting      labs  CBC Full  -  ( 28 Apr 2023 16:48 )  WBC Count : 12.14 K/uL  RBC Count : 3.21 M/uL  Hemoglobin : 9.9 g/dL  Hematocrit : 29.5 %  Platelet Count - Automated : 267 K/uL  Mean Cell Volume : 91.9 fl  Mean Cell Hemoglobin : 30.8 pg  Mean Cell Hemoglobin Concentration : 33.6 gm/dL  Auto Neutrophil # : x  Auto Lymphocyte # : x  Auto Monocyte # : x  Auto Eosinophil # : x  Auto Basophil # : x  Auto Neutrophil % : x  Auto Lymphocyte % : x  Auto Monocyte % : x  Auto Eosinophil % : x  Auto Basophil % : x    04-28    135  |  97  |  25<H>  ----------------------------<  88  4.4   |  32<H>  |  1.99<H>    Ca    8.7      28 Apr 2023 16:48  Phos  4.1     04-28  Mg     2.0     04-28    TPro  6.3  /  Alb  2.6<L>  /  TBili  0.6  /  DBili  x   /  AST  12  /  ALT  <5<L>  /  AlkPhos  75  04-28    PT/INR - ( 28 Apr 2023 16:48 )   PT: 14.2 sec;   INR: 1.19          PTT - ( 28 Apr 2023 16:48 )  PTT:30.7 sec  The current medications were reviewed   MEDICATIONS  (STANDING):  aspirin  chewable 81 milliGRAM(s) Oral daily  atorvastatin 20 milliGRAM(s) Oral at bedtime  dextrose 5%. 500 milliLiter(s) (20 mL/Hr) IV Continuous <Continuous>  heparin   Injectable 5000 Unit(s) SubCutaneous every 8 hours  lacosamide IVPB 100 milliGRAM(s) IV Intermittent every 12 hours  pantoprazole    Tablet 40 milliGRAM(s) Oral before breakfast  polyethylene glycol 3350 17 Gram(s) Oral daily  sodium chloride 0.9% Bolus 750 milliLiter(s) IV Bolus once  sodium chloride 0.9% lock flush 3 milliLiter(s) IV Push every 8 hours  valproate sodium  IVPB 1000 milliGRAM(s) IV Intermittent every 12 hours    MEDICATIONS  (PRN):  acetaminophen     Tablet .. 650 milliGRAM(s) Oral every 6 hours PRN Mild Pain (1 - 3)      Assessment/Plan:  53yr old male with PMH HTN, type A dissection sp Dacron grafts and AV resuspension (2013), CAD sp CABG x 1 ([facility], 5/2013, SVG-RCA), seizures, admitted for surgical mgmt of progression of aneurysmal disease. Recent admission 3/20-4/14 during which patient underwent replacement of transverse aortic arch, second stage thoracic endovascular aortic repair, aorto-axillary bypass, AV replacement (bio 23mm), and CABG x 1 (SVG-RCA) EF 75% ([facility], 3/20). Post op cb lactic acidosis, severe cardiogenic and vasogenic shock, TREY requiring CVVHD and temporary dialysis requirement. Patient presented to [facility] ED 4/27 for syncope vs seizure episode at home, falling onto back of head. Stroke code was called and imaging of head and neck was grossly unremarkable however dissection from EVAR to brachiocephalic/R subclavian artery and prox portion of R carotid and L common iliac with questionable endoleak. Patient was started on esmolol and ER team contacted Dr. Pierre who reported he saw patient in clinic 4/26 and was fine. In ED patient was lethargic with AMS and RUE weakness and difficulty providing history. Decision made to tx to [facility] for further workup. Of note patient saw his neurologist (Dr. Delilah Hernandez, [facility] [phone]) 4/26 with facial tremors, upper extremity tremors, had asterixis and tongue fasciculations. Requested ammonia level however patient refused to stay given his cardiology followup. Depakote level 65 on 750mg BID dosing. Was instructed to go to 1000BID and repeat levels tmrw. Describes fall backward as focal seizure typical of patient. Recommends loading with 500mg IV depakote and following up lab values.     Encephalopathy unclear source  AMS workup  Resend depakote level  Continue depakote 1000mg BID  Appreciate epilepsy recs  Dc EEG  Replete lytes prn  GI/DVT PPX  Bowel Regimen  Pain control  OOB with PT  Close hemodynamic, ventilatory and drain monitoring and management per post op routine  Monitor for arrhythmias and monitor parameters for organ perfusion  Monitor neurologic status  Head of the bed should remain elevated to 45 deg   Chest PT and IS will be encouraged  Monitor adequacy of oxygenation and ventilation and attempt to wean oxygen  Antibiotic regimen will be tailored to the clinical, laboratory and microbiologic data  Nutritional goals will be met using po eventually, ensure adequate caloric intake and monitor the same  Stress ulcer and VTE prophylaxis will be achieved    Glycemic control is satisfactory  Electrolytes have been repleted as necessary and wound care has been carried out   Pain control has been achieved.   Aggressive physical therapy and early mobility and ambulation goals will be met   The family was updated about the course and plan.    CRITICAL CARE TIME personally provided by me  in evaluation and management, reassessments, review and interpretation of labs and x-rays, ventilator and hemodynamic management, formulating a plan and coordinating care: ___30____ MIN.  Time does not include procedural time.    CTICU ATTENDING     					  Alexx Brooks MD

## 2023-04-28 NOTE — PROGRESS NOTE ADULT - SUBJECTIVE AND OBJECTIVE BOX
INTERVAL HPI/OVERNIGHT EVENTS:  Patient was seen and examined at bedside. Pt reports being somnolent with arm pain, no seizure activity.    VITAL SIGNS:  T(F): 99.4 (23 @ 13:00)  HR: 104 (23 @ 13:00)  BP: 130/81 (23 @ 13:00)  RR: 20 (23 @ 13:00)  SpO2: 99% (23 @ 13:00)  Wt(kg): --    PHYSICAL EXAM:  Constitutional:  Lying in bed w/ eyes closed, and at times wince with movement of R arm.  Extremities: no edema, clubbing or cyanosis  Skin: no rash or neurocutaneous signs     Cognitive:  Somnolent, but arousable,. Oriented to name, hospital and year. +tongue fasciculations, asterixis in B/L arms     Cranial Nerves:  III/IV/VI: PERRLA 3mm brisk EOMF No nystagmus (close eyes most times)   V1V2V3: Symmetric, VII: Face appears symmetric VIII: Normal to screening, IX/X: Palate Elevates Symmetrical  XI: Trapezius Symmetric  XII: Tongue midline  Motor:  Power: RUE 4/5 (pain limited), RLE 5-/5 (effort-based?), LLE 5/5, LUE 5/5  Sensation:  Intact to light touch.   Coordination/Gait:  Finger-nose-finger intact  Reflexes:  DTR: 2+ symmetric all 4 limbs  Plantar responses: Down bilaterally    MEDICATIONS  (STANDING):  aspirin  chewable 81 milliGRAM(s) Oral daily  atorvastatin 20 milliGRAM(s) Oral at bedtime  dextrose 5%. 500 milliLiter(s) (20 mL/Hr) IV Continuous <Continuous>  heparin   Injectable 5000 Unit(s) SubCutaneous every 8 hours  lacosamide IVPB 100 milliGRAM(s) IV Intermittent every 12 hours  pantoprazole    Tablet 40 milliGRAM(s) Oral before breakfast  polyethylene glycol 3350 17 Gram(s) Oral daily  sodium chloride 0.9% Bolus 750 milliLiter(s) IV Bolus once  sodium chloride 0.9% lock flush 3 milliLiter(s) IV Push every 8 hours  valproate sodium  IVPB 1000 milliGRAM(s) IV Intermittent every 12 hours    MEDICATIONS  (PRN):  acetaminophen     Tablet .. 650 milliGRAM(s) Oral every 6 hours PRN Mild Pain (1 - 3)      Allergies    No Known Allergies    Intolerances        LABS:                        6.8    8.42  )-----------( 313      ( 2023 07:17 )             21.9         132<L>  |  98  |  23  ----------------------------<  199<H>  3.7   |  26  |  1.86<H>    Ca    7.0<L>      2023 05:12  Phos  3.8       Mg     1.7         TPro  4.8<L>  /  Alb  2.0<L>  /  TBili  0.2  /  DBili  x   /  AST  11  /  ALT  <5<L>  /  AlkPhos  58      PT/INR - ( 2023 05:12 )   PT: 15.1 sec;   INR: 1.27          PTT - ( 2023 05:12 )  PTT:29.6 sec  Urinalysis Basic - ( 2023 13:26 )    Color: Yellow / Appearance: Clear / S.020 / pH: x  Gluc: x / Ketone: NEGATIVE  / Bili: Negative / Urobili: 0.2 E.U./dL   Blood: x / Protein: 100 mg/dL / Nitrite: NEGATIVE   Leuk Esterase: NEGATIVE / RBC: < 5 /HPF / WBC 5-10 /HPF   Sq Epi: x / Non Sq Epi: x / Bacteria: Present /HPF        RADIOLOGY & ADDITIONAL TESTS:  Reviewed

## 2023-04-28 NOTE — ASSESSMENT
[FreeTextEntry1] : 52 year old male s/p total arch replacement via aorta to axillary cannulation on 3/21/23 presents for a postop visit. \par \par I have reviewed the patient's medical records, diagnostic images during the time of this office consultation and have made the following recommendation. The surgical repair is intact and stable.\par \par Patient continues to report decreased appetite and generalized fatigue. \par \par Plan \par Continue current medication regimen. Megace ordered. \par Patient recommended for cardiac rehabilitation. \par Continue to increase activity and walk daily as tolerated. Continue to use incentive spirometer. \par No driving or strenuous activity for 4 weeks after surgery. Avoid lifting >10 to 15 lbs.\par Call MD if you experience fever, fatigue, dizziness, confusion, syncope, shortness of breath, chest pain not relieved with analgesics, increased redness/drainage from incision.\par Continue to use compression stockings. Keep legs elevated above heart when resting/sitting/sleeping\par Follow up with Dr. Jacqueline Gonsales.\par Will contact the patient in one week for clinical recheck via telephone. \par [facility] up in [facility] 3 months with CTA.\par \par

## 2023-04-28 NOTE — PROGRESS NOTE ADULT - ASSESSMENT
53 year-old male with PMH of HTN, type A dissection sp Dacron grafts and AV resuspension (2013), CAD sp CABG x 1 ([facility], 5/2013, SVG-RCA), recent admission on 3/20 - 4/14 for replacement of transverse aortic arch, second stage thoracic endovascular aortic repair, aorto-axillary bypass, AV replacement (bio 23mm), and CABG x 1 (SVG-RCA) EF 75% ([facility], 3/20). seizures (depakote and vimpat), c/b lactic acidosis, severe cardiogenic and vasogenic shock, TREY requiring CVVHD and temporary dialysis requirement who was transferred from [facility] to [facility] on 4/27/23 for surgical management of progression of aneurysmal disease.     Epilepsy consulted for concern for seizure that is unclear given inconsistent information related to the fall. Differential diagnosis includes seizure vs syncope. Was found to have RUE weakness associated w/ pain. Also most likely tongue fasciculations, asterixis are related to metabolic derangement (TREY), and hand tremors are most likely an adverse reaction of depakote.     Recommendations:  - can Discontinue vEEG monitoring  - Continue Vimpat 100mg Q12hrs (IV while patient is somnolent)  - Continue Depakote 1000mg Q12hrs - recent increase by outpatient neurologist (IV while patient is somnolent)   - CT cervical spine w/o contrast due to R arm weakness  - R arm Xray  - Maintain seizure and fall precautions    53 year-old male with PMH of HTN, type A dissection sp Dacron grafts and AV resuspension (2013), CAD sp CABG x 1 ([facility], 5/2013, SVG-RCA), recent admission on 3/20 - 4/14 for replacement of transverse aortic arch, second stage thoracic endovascular aortic repair, aorto-axillary bypass, AV replacement (bio 23mm), and CABG x 1 (SVG-RCA) [facility] seizures (depakote and vimpat), c/b lactic acidosis, severe cardiogenic and vasogenic shock, TREY requiring CVVHD and temporary dialysis requirement who was transferred from [facility] to [facility] on 4/27/23 for surgical management of progression of aneurysmal disease.     Epilepsy consulted for concern for seizure that is unclear given inconsistent information related to the fall. Differential diagnosis includes seizure vs syncope. Was found to have RUE weakness associated w/ pain. Also most likely tongue fasciculations, asterixis are related to metabolic derangement (TREY), and hand tremors are most likely an adverse reaction of depakote.     Recommendations:  - can Discontinue vEEG monitoring  - Continue Vimpat 100mg Q12hrs (IV while patient is somnolent)  - Continue Depakote 1000mg Q12hrs - recent increase by outpatient neurologist (IV while patient is somnolent)   - CT cervical spine w/o contrast due to R arm weakness showing: Mild multilevel cervical spondylitic changes as above notable for mild to moderate right foraminal stenosis at C3-C4, C4-C5, C5-C6. No significant canal stenosis of the cervical spine - consider spine consult  - R arm Xray  - Maintain seizure and fall precautions

## 2023-04-28 NOTE — REASON FOR VISIT
[de-identified] : Total Arch Replacement via Aorta to axillary cannulation [de-identified] : 3/21/23 [de-identified] : 52 year old male, current every day marijuana use with a past medical hx of HTN, type A aortic dissection s/p Dacron grafts, AV resuspension in 2013,CAD s/p CABG x 1 SVG to RCA (5/2013 with Dr. 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 \par \par On 3/21 he underwent the above surgery.Postop course  was complicated by needing rounds of dialysis. Pt then began making his own urine and was started on Bumex which was successfully diuresing him. Pt was discharged home on POD 20. \par \par Pt returned to the out patient office on 4/13 for concerns over ecchymosis over right fifth toe and SOB since discharge. Pt admits to not walking as much and only ambulating around the house but denies trauma to the area. Pt and wife also feel that his legs have become more swollen. pt has been making good urine since discharge and has no urinary issues.  Overnight he was started on IV diuretics. This morning became acutely agitated and locked himself in the bathroom. He was able to be verbally deescalated. Vascular called for ecchymosis of the right 5th toe. Appears to be a bruise and not necrosis or ischemia. Patient was then discharged on 4/14/23. \par \par He presents today for a postop visit. Chest xray today demonstrated a small left pleural effusion. \par \par Patient continues to report decreased appetite and generalized fatigue.

## 2023-04-28 NOTE — PROGRESS NOTE ADULT - SUBJECTIVE AND OBJECTIVE BOX
INTERVAL HPI/OVERNIGHT EVENTS:    3/20: AVR/arch replacement/CABG x 1/2nd stage TEVAR, aorto axillary bypass  EF 75%    52yo Male current every day marijuana use with a past medical hx of HTN, type A aortic dissection s/p Dacron grafts, AV resuspension in 2013,CAD s/p CABG x 1 SVG to RCA (5/2013 with Dr. Medina), Seizure disorder (last 7/4/22) presents for a follow up visit for evaluation and management of his aortic pathology. Patient is referred by Dr. Jacqueline Gonsales. Screen failed for [place] study     CTa C/A/P 11/30/22 - Status post graft repair of the ascending aorta and portion of aortic arch.  Dissecting aneurysm of the descending thoracic aorta (measuring up to 5.7 cm) and abdominal aorta (3.5 cm infrarenal), similar to the MR angiogram of 9/19/2022. Nonocclusive dissection flap present within the innominate artery, aneurysmal right subclavian artery and proximal right common carotid artery as well as aneurysmal left common iliac artery.    Cath (3/9/23): SVG-RCA patent, remaining vessels luminal irregularities. ACCESS: L radial w/ TR band for hemostasis.     to OR 3/20: intraop - 5L Crystalloid/7 units PRBC, 6unit FFP, 3unit platelet, 15unitc cryo, 1000cc fieba  arrived to ICU on Epi/Levo  postop - severe acidosis and Renal failure - lasix infusion started; bicarb given   EEG post-op and CT Head for poor mentation    CT Head 3/23: no acute intracranial abnormality   post-op Atrial fib - amiodarone & hypoxemia - bronch for pulm toilet  D10 started for noted hypoglycemia   CVVHD/Aquapharesis and later HD as needed - serial sessions for fluid removal to optimize before extubation     Extubated 2/29 to [facility] -    ultimately discharged -     Presented to [facility] 4/27 for syncope v seizure episode at home   Code stroke called - Imaging obtained unremarkable for cranial pathology - concern for endoleak - Esmolol infusion started and transferred to [facility] 4/27    of note - just seen by his neurologist and inc medication dosage recommended based on their exam 4/26    Neuro/Epilepsy consulted - EEG placed; rec CT spine to assess RUE weakness    mental status clear - no witnessed seizures or change in mental status noted     EEG report this am - No epileptiform activity and no significant clinical events occurred      ICU Vital Signs Last 24 Hrs  T(C): 36.4 (28 Apr 2023 09:05), Max: 37 (27 Apr 2023 22:49)  T(F): 97.6 (28 Apr 2023 09:05), Max: 98.6 (27 Apr 2023 22:49)  HR: 84 (28 Apr 2023 09:50) (69 - 93) sinus   BP: 150/92 (28 Apr 2023 09:00) (96/59 - 152/85)  BP(mean): 116 (28 Apr 2023 09:00) (72 - 116)  RR: 18 (28 Apr 2023 09:00) (16 - 20)  SpO2: 99% (28 Apr 2023 09:50) (95% - 100%) RA    Qtts:   D5 20cc/hour - stopped     I&O's Summary    27 Apr 2023 07:01  -  28 Apr 2023 07:00  --------------------------------------------------------  IN: 1539.4 mL / OUT: 865 mL / NET: 674.4 mL    28 Apr 2023 07:01  -  28 Apr 2023 10:11  --------------------------------------------------------  IN: 190 mL / OUT: 70 mL / NET: 120 mL    Physical Exam    Heart - regular (-)rub/gallop  Lungs - CTA anterior - no rub/gallop  Abd - (+)BS soft NTND (-)r/r/g  Ext - moving all extremities - no cyanosis/clubbing  Neuro - alert/oriented and interactive - nonfocal   Skin - no rash     LABS:                        6.8    8.42  )-----------( 313      ( 28 Apr 2023 07:17 )             21.9     04-28    132<L>  |  98  |  23  ----------------------------<  199<H>  3.7   |  26  |  1.86<H>    Ca    7.0<L>      28 Apr 2023 05:12  Phos  3.8     04-28  Mg     1.7     04-28    TPro  4.8<L>  /  Alb  2.0<L>  /  TBili  0.2  /  DBili  x   /  AST  11  /  ALT  <5<L>  /  AlkPhos  58  04-28    PT/INR - ( 28 Apr 2023 05:12 )   PT: 15.1 sec;   INR: 1.27     PTT - ( 28 Apr 2023 05:12 )  PTT:29.6 sec    RADIOLOGY & ADDITIONAL STUDIES: reviewed     Patient with complicated operative history Chronic dissection of thoracic aorta/Aortic aneurysm without rupture now s/p complicated operative repair 3/20 - complicated post-op course ultimately discharged - recently seen by neurology and based on symptoms recommended up titration of medications - presenting with Syncope v Seizure - suspected seizure as presentation     1. Neuro  Epilepsy following  - meds titrated   EEG in place - no seizure   cont to monitor neuro status     2. Heme  severe chronic anemia  consent obtained and transfusion 2 [facility]    plan guaiac patient ; no clinical evidence of acute bleeding noted -   compensated anemia - not tachycardic     3. Endocrine  maintain glycemic control   on dextrose infusion until this am  POC testing - /95  HgA1c 5.2    DVT and GI prophylaxis    d/w patient/staff and CTS

## 2023-04-28 NOTE — EEG REPORT - NS EEG TEXT BOX
[facility] Department of Neurology  Inpatient Continuous video-Electroencephalogram    Patient Name:	VINCENT MARIE    :	1970  MRN:	[id number]    Study Start Date/Time:  2023, 6:40:34 PM  Study End Date/Time:    Referred by: Dary Flores MD    Brief Clinical History:  VINCENT MARIE is a 53 year old Male; study performed to investigate for seizures or markers of epilepsy.    Diagnosis Code:   R56.9 convulsions/seizure  The live video was: unmonitored.    Pertinent Medications:  Depakote 1 g BID, Vimpat 100mg BID    Acquisition Details:  Electroencephalography was acquired using a minimum of 21 channels on an [employer] Neurology system v 9.3.1 with electrode placement according to the standard International 10-20 system following ACNS (American Clinical Neurophysiology Society) guidelines for Long-Term Video EEG monitoring.  Anterior temporal T1 and T2 electrodes were utilized whenever possible.   The [employer] automated spike & seizure detections were all reviewed in detail, in addition to extensive portions of raw EEG.      Day 1: 2023 @ 6:40:34 PM to next morning @ 07:00 am  Background:  continuous, with predominantly alpha/theta frequencies.  Symmetry:  No persistent asymmetries of voltage or frequency.  Posterior Dominant Rhythm:  7-8 Hz symmetric, well-organized, and poorly-modulated.  Organization: Rudimentary.  Voltage:  Normal (20+ uV)  Variability: Yes. 		Reactivity: Yes.  N2 sleep: Symmetric, synchronous spindles and K complexes.  Spontaneous Activity:  No epileptiform discharges.  Periodic/rhythmic activity:  None  Events:  No electrographic seizures or significant clinical events.  Provocations:  Hyperventilation and Photic stimulation: was not performed.    Daily Summary:    Continuous Mild generalized   slowing suggestive of a Mild degree of diffuse or multifocal  dysfunction.  No epileptiform activity and no significant clinical events occurred.      Dary Flores MD  Attending Neurologist, [facility] Epilepsy Program

## 2023-04-28 NOTE — CONSULT LETTER
[Please see my note below.] : Please see my note below. [Sincerely,] : Sincerely, [DrHiginio  ___] : Dr. ALAN [FreeTextEntry2] : Jacqueline Gonsales MD  [FreeTextEntry1] : VINCENT MARIE  was seen today for a post operative visit. He is status post Total Arch Replacement via Aorta to axillary cannulation on 3/21/23. We have asked that the patient followup in your office. We have instructed the patient to return to see us in 3 months with repeat CTA. Enclosed is a copy of the operative report. Please contact our office for any questions or concerns at [phone]\par \par Thank you for allowing us to participate in this patients care.\par \par   [FreeTextEntry3] : Elvis Pierre M.D.\par Professor of Cardiovascular and Thoracic Surgery\par Minimally Invasive Valve Surgeon\par Director of Aortic Surgery, [facility]\par Cell: [phone]\par Email: [email or web address] \par \par [facility]:\par [address], [place] [address]\par Office: [phone]\par Fax: [phone]\par \par [facility]:\par Department of Cardiovascular and Thoracic Surgery\par [address] [place] [address], [address]\par Office: [phone]\par Fax: [phone]\par \par \par Practice Manager: Ms. Sloane Fischer\par Email: [email or web address] \par Phone: [phone]

## 2023-04-28 NOTE — PROCEDURE
[FreeTextEntry1] : TTE 4/14/23\par \par  1. Mild symmetric left ventricular hypertrophy.\par  2. Hyperdynamic left ventricular systolic function.\par  3. Intracavitary gradient of 27 mmHg.\par  4. Normal right ventricular size and systolic function.\par  5. Moderately dilated left atrium.\par  6. A bioprosthetic valve is noted in the aortic position. The peak transaortic gradient is 36.00 mmHg. The mean transaortic gradient is 17.00 mmHg. There is no evidence of aortic regurgitation.\par  7. No evidence of pulmonary hypertension, pulmonary artery systolic pressure is 28 mmHg.\par  8. History of type A dissection. Dissection of the descending thoracic and abdominal aorta. Status post total arch replacement. Graft material and turbulent flow is noted in the arch without any significant elevation in velocities. Dissection flap is noted in the abdominal aorta.\par  9. No pericardial effusion.

## 2023-04-29 ENCOUNTER — TRANSCRIPTION ENCOUNTER (OUTPATIENT)
Age: 53
End: 2023-04-29

## 2023-04-29 LAB
ALBUMIN SERPL ELPH-MCNC: 1.9 G/DL — LOW (ref 3.3–5)
ALBUMIN SERPL ELPH-MCNC: 2.2 G/DL — LOW (ref 3.3–5)
ALP SERPL-CCNC: 71 U/L — SIGNIFICANT CHANGE UP (ref 40–120)
ALP SERPL-CCNC: 75 U/L — SIGNIFICANT CHANGE UP (ref 40–120)
ALT FLD-CCNC: <5 U/L — LOW (ref 10–45)
ALT FLD-CCNC: <5 U/L — LOW (ref 10–45)
ANION GAP SERPL CALC-SCNC: 8 MMOL/L — SIGNIFICANT CHANGE UP (ref 5–17)
ANION GAP SERPL CALC-SCNC: 9 MMOL/L — SIGNIFICANT CHANGE UP (ref 5–17)
APTT BLD: 30.6 SEC — SIGNIFICANT CHANGE UP (ref 27.5–35.5)
APTT BLD: 31.5 SEC — SIGNIFICANT CHANGE UP (ref 27.5–35.5)
AST SERPL-CCNC: 11 U/L — SIGNIFICANT CHANGE UP (ref 10–40)
AST SERPL-CCNC: 11 U/L — SIGNIFICANT CHANGE UP (ref 10–40)
BILIRUB SERPL-MCNC: 0.4 MG/DL — SIGNIFICANT CHANGE UP (ref 0.2–1.2)
BILIRUB SERPL-MCNC: 0.4 MG/DL — SIGNIFICANT CHANGE UP (ref 0.2–1.2)
BUN SERPL-MCNC: 24 MG/DL — HIGH (ref 7–23)
BUN SERPL-MCNC: 25 MG/DL — HIGH (ref 7–23)
CALCIUM SERPL-MCNC: 8 MG/DL — LOW (ref 8.4–10.5)
CALCIUM SERPL-MCNC: 8.4 MG/DL — SIGNIFICANT CHANGE UP (ref 8.4–10.5)
CHLORIDE SERPL-SCNC: 95 MMOL/L — LOW (ref 96–108)
CHLORIDE SERPL-SCNC: 96 MMOL/L — SIGNIFICANT CHANGE UP (ref 96–108)
CO2 SERPL-SCNC: 28 MMOL/L — SIGNIFICANT CHANGE UP (ref 22–31)
CO2 SERPL-SCNC: 29 MMOL/L — SIGNIFICANT CHANGE UP (ref 22–31)
CREAT SERPL-MCNC: 1.64 MG/DL — HIGH (ref 0.5–1.3)
CREAT SERPL-MCNC: 1.68 MG/DL — HIGH (ref 0.5–1.3)
EGFR: 48 ML/MIN/1.73M2 — LOW
EGFR: 49 ML/MIN/1.73M2 — LOW
GLUCOSE BLDC GLUCOMTR-MCNC: 66 MG/DL — LOW (ref 70–99)
GLUCOSE BLDC GLUCOMTR-MCNC: 80 MG/DL — SIGNIFICANT CHANGE UP (ref 70–99)
GLUCOSE BLDC GLUCOMTR-MCNC: 82 MG/DL — SIGNIFICANT CHANGE UP (ref 70–99)
GLUCOSE BLDC GLUCOMTR-MCNC: 84 MG/DL — SIGNIFICANT CHANGE UP (ref 70–99)
GLUCOSE SERPL-MCNC: 66 MG/DL — LOW (ref 70–99)
GLUCOSE SERPL-MCNC: 74 MG/DL — SIGNIFICANT CHANGE UP (ref 70–99)
HCT VFR BLD CALC: 29.6 % — LOW (ref 39–50)
HCT VFR BLD CALC: 32.6 % — LOW (ref 39–50)
HGB BLD-MCNC: 10 G/DL — LOW (ref 13–17)
HGB BLD-MCNC: 10.9 G/DL — LOW (ref 13–17)
INR BLD: 1.19 — HIGH (ref 0.88–1.16)
INR BLD: 1.22 — HIGH (ref 0.88–1.16)
MAGNESIUM SERPL-MCNC: 1.9 MG/DL — SIGNIFICANT CHANGE UP (ref 1.6–2.6)
MAGNESIUM SERPL-MCNC: 1.9 MG/DL — SIGNIFICANT CHANGE UP (ref 1.6–2.6)
MCHC RBC-ENTMCNC: 30.1 PG — SIGNIFICANT CHANGE UP (ref 27–34)
MCHC RBC-ENTMCNC: 30.1 PG — SIGNIFICANT CHANGE UP (ref 27–34)
MCHC RBC-ENTMCNC: 33.4 GM/DL — SIGNIFICANT CHANGE UP (ref 32–36)
MCHC RBC-ENTMCNC: 33.8 GM/DL — SIGNIFICANT CHANGE UP (ref 32–36)
MCV RBC AUTO: 89.2 FL — SIGNIFICANT CHANGE UP (ref 80–100)
MCV RBC AUTO: 90.1 FL — SIGNIFICANT CHANGE UP (ref 80–100)
NRBC # BLD: 0 /100 WBCS — SIGNIFICANT CHANGE UP (ref 0–0)
NRBC # BLD: 0 /100 WBCS — SIGNIFICANT CHANGE UP (ref 0–0)
PHOSPHATE SERPL-MCNC: 4.1 MG/DL — SIGNIFICANT CHANGE UP (ref 2.5–4.5)
PLATELET # BLD AUTO: 263 K/UL — SIGNIFICANT CHANGE UP (ref 150–400)
PLATELET # BLD AUTO: 267 K/UL — SIGNIFICANT CHANGE UP (ref 150–400)
POTASSIUM SERPL-MCNC: 3.8 MMOL/L — SIGNIFICANT CHANGE UP (ref 3.5–5.3)
POTASSIUM SERPL-MCNC: 3.9 MMOL/L — SIGNIFICANT CHANGE UP (ref 3.5–5.3)
POTASSIUM SERPL-SCNC: 3.8 MMOL/L — SIGNIFICANT CHANGE UP (ref 3.5–5.3)
POTASSIUM SERPL-SCNC: 3.9 MMOL/L — SIGNIFICANT CHANGE UP (ref 3.5–5.3)
PROT SERPL-MCNC: 5.8 G/DL — LOW (ref 6–8.3)
PROT SERPL-MCNC: 6.2 G/DL — SIGNIFICANT CHANGE UP (ref 6–8.3)
PROTHROM AB SERPL-ACNC: 14.2 SEC — HIGH (ref 10.5–13.4)
PROTHROM AB SERPL-ACNC: 14.6 SEC — HIGH (ref 10.5–13.4)
RBC # BLD: 3.32 M/UL — LOW (ref 4.2–5.8)
RBC # BLD: 3.62 M/UL — LOW (ref 4.2–5.8)
RBC # FLD: 14.7 % — HIGH (ref 10.3–14.5)
RBC # FLD: 14.9 % — HIGH (ref 10.3–14.5)
SODIUM SERPL-SCNC: 132 MMOL/L — LOW (ref 135–145)
SODIUM SERPL-SCNC: 133 MMOL/L — LOW (ref 135–145)
WBC # BLD: 10.32 K/UL — SIGNIFICANT CHANGE UP (ref 3.8–10.5)
WBC # BLD: 11.73 K/UL — HIGH (ref 3.8–10.5)
WBC # FLD AUTO: 10.32 K/UL — SIGNIFICANT CHANGE UP (ref 3.8–10.5)
WBC # FLD AUTO: 11.73 K/UL — HIGH (ref 3.8–10.5)

## 2023-04-29 PROCEDURE — 99232 SBSQ HOSP IP/OBS MODERATE 35: CPT

## 2023-04-29 PROCEDURE — 95720 EEG PHY/QHP EA INCR W/VEEG: CPT

## 2023-04-29 RX ORDER — METOPROLOL TARTRATE 50 MG
12.5 TABLET ORAL EVERY 6 HOURS
Refills: 0 | Status: DISCONTINUED | OUTPATIENT
Start: 2023-04-29 | End: 2023-04-30

## 2023-04-29 RX ADMIN — SODIUM CHLORIDE 3 MILLILITER(S): 9 INJECTION INTRAMUSCULAR; INTRAVENOUS; SUBCUTANEOUS at 13:14

## 2023-04-29 RX ADMIN — ATORVASTATIN CALCIUM 20 MILLIGRAM(S): 80 TABLET, FILM COATED ORAL at 21:46

## 2023-04-29 RX ADMIN — LACOSAMIDE 120 MILLIGRAM(S): 50 TABLET ORAL at 05:42

## 2023-04-29 RX ADMIN — Medication 60 MILLIGRAM(S): at 22:35

## 2023-04-29 RX ADMIN — Medication 12.5 MILLIGRAM(S): at 13:17

## 2023-04-29 RX ADMIN — Medication 650 MILLIGRAM(S): at 11:30

## 2023-04-29 RX ADMIN — SODIUM CHLORIDE 3 MILLILITER(S): 9 INJECTION INTRAMUSCULAR; INTRAVENOUS; SUBCUTANEOUS at 05:30

## 2023-04-29 RX ADMIN — LACOSAMIDE 120 MILLIGRAM(S): 50 TABLET ORAL at 18:50

## 2023-04-29 RX ADMIN — Medication 81 MILLIGRAM(S): at 10:44

## 2023-04-29 RX ADMIN — Medication 650 MILLIGRAM(S): at 22:03

## 2023-04-29 RX ADMIN — Medication 650 MILLIGRAM(S): at 23:00

## 2023-04-29 RX ADMIN — PANTOPRAZOLE SODIUM 40 MILLIGRAM(S): 20 TABLET, DELAYED RELEASE ORAL at 08:24

## 2023-04-29 RX ADMIN — Medication 650 MILLIGRAM(S): at 10:17

## 2023-04-29 RX ADMIN — HEPARIN SODIUM 5000 UNIT(S): 5000 INJECTION INTRAVENOUS; SUBCUTANEOUS at 13:17

## 2023-04-29 RX ADMIN — Medication 12.5 MILLIGRAM(S): at 18:28

## 2023-04-29 RX ADMIN — Medication 60 MILLIGRAM(S): at 09:16

## 2023-04-29 RX ADMIN — SODIUM CHLORIDE 3 MILLILITER(S): 9 INJECTION INTRAMUSCULAR; INTRAVENOUS; SUBCUTANEOUS at 21:40

## 2023-04-29 RX ADMIN — HEPARIN SODIUM 5000 UNIT(S): 5000 INJECTION INTRAVENOUS; SUBCUTANEOUS at 05:32

## 2023-04-29 RX ADMIN — Medication 12.5 MILLIGRAM(S): at 23:17

## 2023-04-29 RX ADMIN — HEPARIN SODIUM 5000 UNIT(S): 5000 INJECTION INTRAVENOUS; SUBCUTANEOUS at 21:46

## 2023-04-29 NOTE — PROGRESS NOTE ADULT - ASSESSMENT
53 YO Male, current every day marijuana use with a past medical hx of HTN, type A aortic dissection s/p Dacron grafts, AV resuspension in 2013,CAD s/p CABG x 1 SVG to RCA (5/2013 with Dr. 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 On 3/21 he underwent a Total Arch Replacement via Aorta to axillary cannulation. Pts ICU course was complicated by needing rounds of dialysis. Pt then began making his own urine and was started on bumex which was successfully diuresing him, Pt was discharged home on POD 20. Pt returned to the out patient office on 4/13 for concerns over ecchymosis over right fifth toe and SOB since discharge. Pt admits to not walking as much and only ambulating around the house but denies trauma to the area. Pt and wife also feel that his legs have become more swollen. pt has been making good urine since discharge and has no urinary issues.  Pt was seen in the ED and re admitted for diuresis and evaluation of toe discoloration.     Plan:    Neurovascular:   -Pain well controlled with current regimen. PRN's:    Cardiovascular:   -Hemodynamically stable.   -Monitor: BP, HR, tele    Respiratory:   -Oxygenating well on room air  -Encourage continued use of IS 10x/hr and frequent ambulation  -CXR:    GI:  -GI PPX:  -PO Diet  -Bowel Regimen:     Renal / :  -Continue to monitor renal function: BUN/Cr  -Monitor I/O's daily     Endocrine:    -No hx of DM or thyroid dx  -A1c:  -TSH:    Hematologic:  -CBC: H/H-  -Coagulation Panel.    ID:  -Temperature:   -CBC: WBC-  -Continue to observe for SIRS/Sepsis Syndrome.    Prophylaxis:  -DVT prophylaxis with 5000 SubQ Heparin q8h.  -Continue with SCD's b/l while patient is at rest     Disposition:  -Discharge home once patient is medically ready   51 YO Male, current every day marijuana use, w/ PMHx of HTN, type A aortic dissection s/p Dacron grafts, AV resuspension in 2013, CAD s/p CABG x 1 SVG to RCA (5/2013 with Dr. Medina), seizure disorder (last episode on 7/4/22) who was admitted for surgical mgmt of progression of aneurysmal disease. Recent admission 3/20-4/14 during which patient underwent replacement of transverse aortic arch, second stage thoracic endovascular aortic repair, aorto-axillary bypass, AV replacement (bio 23mm), and CABG x 1 (SVG-RCA) EF 75% (Ignacio, 3/20). Post op cb lactic acidosis, severe cardiogenic and vasogenic shock, TREY requiring CVVHD and temporary dialysis requirement. Patient presented to [facility] ED 4/27 for syncope vs seizure episode at home, falling onto back of head. Stroke code was called and imaging of head and neck was grossly unremarkable however dissection from EVAR to brachiocephalic/R subclavian artery and prox portion of R carotid and L common iliac with questionable endoleak. Patient was started on esmolol and ER team contacted Dr. Pierre who reported he saw patient in clinic 4/26 and was fine. In ED patient was lethargic with AMS and RUE weakness and difficulty providing history. Decision made to tx to [facility] for further workup. Of note patient saw his neurologist (Dr. Delilah Hernandez, [facility] [phone]) 4/26 with facial tremors, upper extremity tremors, had asterixis and tongue fasciculations. Requested ammonia level however patient refused to stay given his cardiology followup. Patient admitted to CTSx team and had Epilepsy team was consulted. Patient loaded with depakote & vimpat, EEG uneventful. 4/28 CT cervical spine negative for fracture. 4/29 patient transferred to [facility].     Plan:    Neurovascular:   -Seizure Disorder  -Neuro following  -Cont Valproate sodium IVPB 1000mg Q12H  -Pain well controlled with current regimen. PRN's: Tylenol    Cardiovascular:   -Hx of thoracic aortic aneurysm s/p replacement of transverse aortic arch, second stage thoracic endovascular aortic repair, aorto-axillary bypass, AV replacement (bio 23mm), and CABG x 1 (SVG-RCA) on 3/20/23  -Cont ASA, BB, statin  -Hx of HTN  -Cont Lopressor 12.5mg Q6  -Hemodynamically stable.   -Monitor: BP, HR, tele    Respiratory:   -Oxygenating well on room air  -Encourage continued use of IS 10x/hr and frequent ambulation  -CXR: stable    GI:  -GI PPX: Protonix  -PO Diet  -Bowel Regimen: Miralax    Renal / :  -Continue to monitor renal function: BUN/Cr: 24/1.64  -Monitor I/O's daily     Endocrine:    -No hx of DM or thyroid dx  -A1c: 5.2  -TSH: 2.17    Hematologic:  -CBC: H/H- 10/32  -Coagulation Panel.    ID:  -Temperature: Afebrile  -CBC: WBC- 11  -Continue to observe for SIRS/Sepsis Syndrome.    Prophylaxis:  -DVT prophylaxis with 5000 SubQ Heparin q8h.  -Continue with SCD's b/l while patient is at rest     Disposition:  -Discharge home once patient is medically ready   53 YO Male, current every day marijuana use, w/ PMHx of HTN, type A aortic dissection s/p Dacron grafts, AV resuspension in 2013, CAD s/p CABG x 1 SVG to RCA (5/2013 with Dr. Medina), seizure disorder (last episode on 7/4/22) who was admitted for surgical mgmt of progression of aneurysmal disease. Recent admission 3/20-4/14 during which patient underwent replacement of transverse aortic arch, second stage thoracic endovascular aortic repair, aorto-axillary bypass, AV replacement (bio 23mm), and CABG x 1 (SVG-RCA) EF 75% (Ignacio, 3/20). Post op cb lactic acidosis, severe cardiogenic and vasogenic shock, TREY requiring CVVHD and temporary dialysis requirement. Patient presented to [facility] ED 4/27 for syncope vs seizure episode at home, falling onto back of head. Stroke code was called and imaging of head and neck was grossly unremarkable however dissection from EVAR to brachiocephalic/R subclavian artery and prox portion of R carotid and L common iliac with questionable endoleak. Patient was started on esmolol and ER team contacted Dr. Pierre who reported he saw patient in clinic 4/26 and was fine. In ED patient was lethargic with AMS and RUE weakness and difficulty providing history. Decision made to tx to [facility] on esmolol gtt for further workup. Of note patient saw his neurologist (Dr. Delilah Hernandez, [facility] [phone]) 4/26 with facial tremors, upper extremity tremors, had asterixis and tongue fasciculations. Requested ammonia level however patient refused to stay given his cardiology followup. Patient admitted to CTSx team and had Epilepsy team was consulted. Patient loaded with depakote & vimpat, EEG uneventful. 4/28 CT cervical spine negative for fracture, esmolol gtt weaned off. 4/29 patient transferred to [facility].     Plan:    Neurovascular:   -Seizure Disorder  -Neuro following  -Cont Valproate sodium IVPB 1000mg Q12H  -Pain well controlled with current regimen. PRN's: Tylenol    Cardiovascular:   -Hx of thoracic aortic aneurysm s/p replacement of transverse aortic arch, second stage thoracic endovascular aortic repair, aorto-axillary bypass, AV replacement (bio 23mm), and CABG x 1 (SVG-RCA) on 3/20/23  -Cont ASA, BB, statin  -Hx of HTN  -Cont Lopressor 12.5mg Q6  -Hemodynamically stable.   -Monitor: BP, HR, tele    Respiratory:   -Oxygenating well on room air  -Encourage continued use of IS 10x/hr and frequent ambulation  -CXR: stable    GI:  -GI PPX: Protonix  -PO Diet  -Bowel Regimen: Miralax    Renal / :  -Continue to monitor renal function: BUN/Cr: 24/1.64  -Monitor I/O's daily     Endocrine:    -No hx of DM or thyroid dx  -A1c: 5.2  -TSH: 2.17    Hematologic:  -CBC: H/H- 10/32  -Coagulation Panel.    ID:  -Temperature: Afebrile  -CBC: WBC- 11  -Continue to observe for SIRS/Sepsis Syndrome.    Prophylaxis:  -DVT prophylaxis with 5000 SubQ Heparin q8h.  -Continue with SCD's b/l while patient is at rest     Disposition:  -Discharge home once patient is medically ready   53 YO Male, current every day marijuana use, w/ PMHx of HTN, type A aortic dissection s/p Dacron grafts, AV resuspension in 2013, CAD s/p CABG x 1 SVG to RCA (5/2013 with Dr. Medina), seizure disorder (last episode on 7/4/22) who was admitted for surgical mgmt of progression of aneurysmal disease. Recent admission 3/20-4/14 during which patient underwent replacement of transverse aortic arch, second stage thoracic endovascular aortic repair, aorto-axillary bypass, AV replacement (bio 23mm), and CABG x 1 (SVG-RCA) EF 75% (Ignacio, 3/20). Post op cb lactic acidosis, severe cardiogenic and vasogenic shock, TREY requiring CVVHD and temporary dialysis requirement. Patient presented to [facility] ED 4/27 for syncope vs seizure episode at home, falling onto back of head. Stroke code was called and imaging of head and neck was grossly unremarkable however dissection from EVAR to brachiocephalic/R subclavian artery and prox portion of R carotid and L common iliac with questionable endoleak. Patient was started on esmolol and ER team contacted Dr. Pierre who reported he saw patient in clinic 4/26 and was fine. In ED patient was lethargic with AMS and RUE weakness and difficulty providing history. Decision made to tx to [facility] on esmolol gtt for further workup. Of note patient saw his neurologist (Dr. Delilah Hernandez, [facility] [phone]) 4/26 with facial tremors, upper extremity tremors, had asterixis and tongue fasciculations. Requested ammonia level however patient refused to stay given his cardiology followup. Patient admitted to CTSx team and had Epilepsy team was consulted. Patient loaded with depakote & vimpat, EEG uneventful. Imaging at [facility] reviewed per Dr. Pierre, no acute surgical intervention indicated. 4/28 CT cervical spine negative for fracture, esmolol gtt weaned off. 4/29 patient transferred to [facility].     Plan:    Neurovascular:   -Seizure Disorder  -Neuro following  -Cont Valproate sodium IVPB 1000mg Q12H  -Pain well controlled with current regimen. PRN's: Tylenol    Cardiovascular:   -Hx of thoracic aortic aneurysm s/p replacement of transverse aortic arch, second stage thoracic endovascular aortic repair, aorto-axillary bypass, AV replacement (bio 23mm), and CABG x 1 (SVG-RCA) on 3/20/23  -Cont ASA, BB, statin  -Hx of HTN  -Cont Lopressor 12.5mg Q6  -Hemodynamically stable.   -Monitor: BP, HR, tele    Respiratory:   -Oxygenating well on room air  -Encourage continued use of IS 10x/hr and frequent ambulation  -CXR: stable    GI:  -GI PPX: Protonix  -PO Diet  -Bowel Regimen: Miralax    Renal / :  -Continue to monitor renal function: BUN/Cr: 24/1.64  -Monitor I/O's daily     Endocrine:    -No hx of DM or thyroid dx  -A1c: 5.2  -TSH: 2.17    Hematologic:  -CBC: H/H- 10/32  -Coagulation Panel.    ID:  -Temperature: Afebrile  -CBC: WBC- 11  -Continue to observe for SIRS/Sepsis Syndrome.    Prophylaxis:  -DVT prophylaxis with 5000 SubQ Heparin q8h.  -Continue with SCD's b/l while patient is at rest     Disposition:  -Discharge home once patient is medically ready

## 2023-04-29 NOTE — DISCHARGE NOTE PROVIDER - CARE PROVIDER_API CALL
Elvis Pierre (MD)  Surgery; Thoracic and Cardiac Surgery  [address] [place] [address]  Phone: [phone]  Fax: [phone]  Follow Up Time:

## 2023-04-29 NOTE — DISCHARGE NOTE PROVIDER - CARE PROVIDERS DIRECT ADDRESSES
[email or web address]

## 2023-04-29 NOTE — PROGRESS NOTE ADULT - SUBJECTIVE AND OBJECTIVE BOX
Operation / Date:     SUBJECTIVE ASSESSMENT: Patient seen and examined at bedside.     Vital Signs Last 24 Hrs  T(C): 37.2 (29 Apr 2023 13:47), Max: 37.4 (29 Apr 2023 08:45)  T(F): 98.9 (29 Apr 2023 13:47), Max: 99.4 (29 Apr 2023 08:45)  HR: 89 (29 Apr 2023 16:00) (87 - 120)  BP: 141/97 (29 Apr 2023 16:00) (114/74 - 150/87)  BP(mean): 114 (29 Apr 2023 16:00) (88 - 118)  RR: 12 (29 Apr 2023 16:00) (12 - 20)  SpO2: 98% (29 Apr 2023 16:00) (95% - 99%)    Parameters below as of 29 Apr 2023 16:00  Patient On (Oxygen Delivery Method): room air    I&O's Detail    28 Apr 2023 07:01  -  29 Apr 2023 07:00  --------------------------------------------------------  IN:    dextrose 5%: 60 mL    IV PiggyBack: 220 mL    Oral Fluid: 100 mL    PRBCs (Packed Red Blood Cells): 710 mL    Sodium Chloride 0.9% Bolus: 150 mL  Total IN: 1240 mL    OUT:    Indwelling Catheter - Urethral (mL): 435 mL    Voided (mL): 400 mL  Total OUT: 835 mL    Total NET: 405 mL    29 Apr 2023 07:01  -  29 Apr 2023 16:55  --------------------------------------------------------  IN:  Total IN: 0 mL    OUT:    Voided (mL): 100 mL  Total OUT: 100 mL    Total NET: -100 mL    CHEST TUBE:    TIA DRAIN:    EPICARDIAL WIRES:   TIE DOWNS:   JONES:     PHYSICAL EXAM:  GENERAL: NAD, lying in bed comfortably  HEAD:  Atraumatic, Normocephalic  EYES: EOMI, PERRLA, conjunctiva and sclera clear  ENT: Moist mucous membranes  NECK: Supple, No JVD  CHEST/LUNG: Clear to auscultation bilaterally; No rales, rhonchi, wheezing, or rubs. Unlabored respirations  HEART: Regular rate and rhythm; No murmurs, rubs, or gallops  ABDOMEN: Bowel sounds present; Soft, Nontender, Nondistended. No hepatomegally  EXTREMITIES:  2+ Peripheral Pulses, brisk capillary refill. No clubbing, cyanosis, or edema  NERVOUS SYSTEM:  Alert & Oriented X3, speech clear. No deficits     LABS:                        10.9   11.73 )-----------( 263      ( 29 Apr 2023 10:14 )             32.6     PT/INR - ( 29 Apr 2023 10:14 )   PT: 14.2 sec;   INR: 1.19        PTT - ( 29 Apr 2023 10:14 )  PTT:30.6 sec    04-29    132<L>  |  95<L>  |  24<H>  ----------------------------<  66<L>  3.9   |  29  |  1.64<H>    Ca    8.4      29 Apr 2023 10:14  Phos  4.1     04-29  Mg     1.9     04-29    TPro  6.2  /  Alb  2.2<L>  /  TBili  0.4  /  DBili  x   /  AST  11  /  ALT  <5<L>  /  AlkPhos  75  04-29    MEDICATIONS  (STANDING):  aspirin  chewable 81 milliGRAM(s) Oral daily  atorvastatin 20 milliGRAM(s) Oral at bedtime  heparin   Injectable 5000 Unit(s) SubCutaneous every 8 hours  lacosamide IVPB 100 milliGRAM(s) IV Intermittent every 12 hours  metoprolol tartrate 12.5 milliGRAM(s) Oral every 6 hours  pantoprazole    Tablet 40 milliGRAM(s) Oral before breakfast  polyethylene glycol 3350 17 Gram(s) Oral daily  sodium chloride 0.9% lock flush 3 milliLiter(s) IV Push every 8 hours  valproate sodium  IVPB 1000 milliGRAM(s) IV Intermittent every 12 hours    MEDICATIONS  (PRN):  acetaminophen     Tablet .. 650 milliGRAM(s) Oral every 6 hours PRN Mild Pain (1 - 3)    RADIOLOGY & ADDITIONAL TESTS:  < from: CT Cervical Spine No Cont (04.28.23 @ 13:44) >  FINDINGS:    Cervical lordosis maintained Cervical vertebral body heights are   maintained. No significant spondylolisthesis. Cervical spinous processes   are intact. Articular pillars of cervical spine are intact. No jumped or   perched facets identified. Arch of C1 and odontoid process of C2 appear   to be intact. No acute fracture identified. Atlantooccipital and   atlantodental articulations are within normal limits. The atlantodental   interval within normal limits. Intervertebral disc spaces are   maintained.. Paravertebral soft tissues are within normal limits.   Visualized lung apices are unremarkable. Partially imaged median   sternotomy and endovascular stent/replacement of the aorta on    radiograph.    There are multilevel findings as follows:    Craniocervical junction unremarkable.    C2-C3: No significant canal or foraminal stenosis.    C3-C4: Posterior osteophytic ridging with disc bulging and bilateral   uncovertebral/facet hypertrophy contributing to no significant canal   stenosis and moderate right foraminal stenosis.    C4-C5: Posterior osteophytic ridging with disc bulging and bilateral   uncovertebral/facet hypertrophy contributing to no significant canal   stenosis and mild right foraminal stenosis.    C5-C6: Posterior osteophytic ridging with disc bulging and bilateral   uncovertebral/facet hypertrophy contributing to no significant canal   stenosis and mild right foraminal stenosis.    C6-C7: No significant canal or foraminal stenosis.    C7-T1: No significant canal or foraminal stenosis.      IMPRESSION:    No acute fracture or traumatic subluxation identified within cervical   spine.    Mild multilevel cervical spondylitic changes as above notable for mild to   moderate right foraminal stenosis at C3-C4, C4-C5, C5-C6. No significant   canal stenosis of the cervical spine.   Operation / Date:     SUBJECTIVE ASSESSMENT: Patient seen and examined at bedside. Patient offers no complaints and is eager to go home tomorrow. Denies chills, chest pain, SOB, palpitations, N/V.     Vital Signs Last 24 Hrs  T(C): 37.2 (29 Apr 2023 13:47), Max: 37.4 (29 Apr 2023 08:45)  T(F): 98.9 (29 Apr 2023 13:47), Max: 99.4 (29 Apr 2023 08:45)  HR: 89 (29 Apr 2023 16:00) (87 - 120)  BP: 141/97 (29 Apr 2023 16:00) (114/74 - 150/87)  BP(mean): 114 (29 Apr 2023 16:00) (88 - 118)  RR: 12 (29 Apr 2023 16:00) (12 - 20)  SpO2: 98% (29 Apr 2023 16:00) (95% - 99%)    Parameters below as of 29 Apr 2023 16:00  Patient On (Oxygen Delivery Method): room air    I&O's Detail    28 Apr 2023 07:01  -  29 Apr 2023 07:00  --------------------------------------------------------  IN:    dextrose 5%: 60 mL    IV PiggyBack: 220 mL    Oral Fluid: 100 mL    PRBCs (Packed Red Blood Cells): 710 mL    Sodium Chloride 0.9% Bolus: 150 mL  Total IN: 1240 mL    OUT:    Indwelling Catheter - Urethral (mL): 435 mL    Voided (mL): 400 mL  Total OUT: 835 mL    Total NET: 405 mL    29 Apr 2023 07:01  -  29 Apr 2023 16:55  --------------------------------------------------------  IN:  Total IN: 0 mL    OUT:    Voided (mL): 100 mL  Total OUT: 100 mL    Total NET: -100 mL    CHEST TUBE:  None  TIA DRAIN:  None  EPICARDIAL WIRES: None  TIE DOWNS: None  JONES: None    PHYSICAL EXAM:  GENERAL: NAD, lying in bed comfortably  HEAD:  Atraumatic, Normocephalic  EYES: EOMI, PERRLA, conjunctiva and sclera clear  ENT: Moist mucous membranes  NECK: Supple, No JVD  CHEST/LUNG: CTAB; MSI healing well  HEART: Regular rate and rhythm; No murmurs, rubs, or gallops  ABDOMEN: Bowel sounds present; Soft, Nontender, Nondistended. No hepatomegally  EXTREMITIES: 2+ Peripheral Pulses, brisk capillary refill. No clubbing, cyanosis, or edema  NERVOUS SYSTEM:  Alert & Oriented X3     LABS:                        10.9   11.73 )-----------( 263      ( 29 Apr 2023 10:14 )             32.6     PT/INR - ( 29 Apr 2023 10:14 )   PT: 14.2 sec;   INR: 1.19        PTT - ( 29 Apr 2023 10:14 )  PTT:30.6 sec    04-29    132<L>  |  95<L>  |  24<H>  ----------------------------<  66<L>  3.9   |  29  |  1.64<H>    Ca    8.4      29 Apr 2023 10:14  Phos  4.1     04-29  Mg     1.9     04-29    TPro  6.2  /  Alb  2.2<L>  /  TBili  0.4  /  DBili  x   /  AST  11  /  ALT  <5<L>  /  AlkPhos  75  04-29    MEDICATIONS  (STANDING):  aspirin  chewable 81 milliGRAM(s) Oral daily  atorvastatin 20 milliGRAM(s) Oral at bedtime  heparin   Injectable 5000 Unit(s) SubCutaneous every 8 hours  lacosamide IVPB 100 milliGRAM(s) IV Intermittent every 12 hours  metoprolol tartrate 12.5 milliGRAM(s) Oral every 6 hours  pantoprazole    Tablet 40 milliGRAM(s) Oral before breakfast  polyethylene glycol 3350 17 Gram(s) Oral daily  sodium chloride 0.9% lock flush 3 milliLiter(s) IV Push every 8 hours  valproate sodium  IVPB 1000 milliGRAM(s) IV Intermittent every 12 hours    MEDICATIONS  (PRN):  acetaminophen     Tablet .. 650 milliGRAM(s) Oral every 6 hours PRN Mild Pain (1 - 3)    RADIOLOGY & ADDITIONAL TESTS:  < from: CT Cervical Spine No Cont (04.28.23 @ 13:44) >  FINDINGS:    Cervical lordosis maintained Cervical vertebral body heights are   maintained. No significant spondylolisthesis. Cervical spinous processes   are intact. Articular pillars of cervical spine are intact. No jumped or   perched facets identified. Arch of C1 and odontoid process of C2 appear   to be intact. No acute fracture identified. Atlantooccipital and   atlantodental articulations are within normal limits. The atlantodental   interval within normal limits. Intervertebral disc spaces are   maintained.. Paravertebral soft tissues are within normal limits.   Visualized lung apices are unremarkable. Partially imaged median   sternotomy and endovascular stent/replacement of the aorta on    radiograph.    There are multilevel findings as follows:    Craniocervical junction unremarkable.    C2-C3: No significant canal or foraminal stenosis.    C3-C4: Posterior osteophytic ridging with disc bulging and bilateral   uncovertebral/facet hypertrophy contributing to no significant canal   stenosis and moderate right foraminal stenosis.    C4-C5: Posterior osteophytic ridging with disc bulging and bilateral   uncovertebral/facet hypertrophy contributing to no significant canal   stenosis and mild right foraminal stenosis.    C5-C6: Posterior osteophytic ridging with disc bulging and bilateral   uncovertebral/facet hypertrophy contributing to no significant canal   stenosis and mild right foraminal stenosis.    C6-C7: No significant canal or foraminal stenosis.    C7-T1: No significant canal or foraminal stenosis.      IMPRESSION:    No acute fracture or traumatic subluxation identified within cervical   spine.    Mild multilevel cervical spondylitic changes as above notable for mild to   moderate right foraminal stenosis at C3-C4, C4-C5, C5-C6. No significant   canal stenosis of the cervical spine.

## 2023-04-29 NOTE — DISCHARGE NOTE PROVIDER - NSDCFUADDAPPT_GEN_ALL_CORE_FT
The office will call you Monday with follow-up appointment time.    Please make follow-up appointment with your Neurologist Dr. Delilah Hernandez within 1-2 weeks.

## 2023-04-29 NOTE — DISCHARGE NOTE PROVIDER - NSDCMRMEDTOKEN_GEN_ALL_CORE_FT
acetaminophen 325 mg oral tablet: 2 tab(s) orally every 6 hours  Aspirin Enteric Coated 81 mg oral delayed release tablet: 1 tab(s) orally once a day  atorvastatin 20 mg oral tablet: 1 tab(s) orally once a day  bumetanide 2 mg oral tablet: 1 tab(s) orally once a day  collagenase 250 units/g topical ointment: Apply topically to affected area once a day 1 Apply topically to affected area once a day  divalproex sodium 250 mg oral tablet, extended release: 1 tab(s) orally once a day  divalproex sodium 500 mg oral tablet, extended release: 1 tab(s) orally once a day  lacosamide 100 mg oral tablet: 1 tab(s) orally 2 times a day MDD: 2  oxyCODONE 5 mg oral tablet: 1 tab(s) orally every 4 hours as needed for Moderate Pain (4 - 6) MDD: 4  pantoprazole 40 mg oral delayed release tablet: 1 tab(s) orally once a day (before a meal)  Toprol- mg oral tablet, extended release: 1 tab(s) orally once a day   acetaminophen 325 mg oral tablet: 2 tab(s) orally every 6 hours  Aspirin Enteric Coated 81 mg oral delayed release tablet: 1 tab(s) orally once a day  atorvastatin 20 mg oral tablet: 1 tab(s) orally once a day  divalproex sodium 500 mg oral tablet, extended release: 2 tab(s) orally every 12 hours  lacosamide 100 mg oral tablet: 1 tab(s) orally 2 times a day MDD: 2  pantoprazole 40 mg oral delayed release tablet: 1 tab(s) orally once a day (before a meal)  Toprol- mg oral tablet, extended release: 1 tab(s) orally once a day

## 2023-04-29 NOTE — DISCHARGE NOTE PROVIDER - NSDCFUADDINST_GEN_ALL_CORE_FT
-Walk daily as tolerated and use your incentive spirometer 10 times every hour while you are awake.     -Please weigh yourself daily. If you notice over a 3 pound weight gain in 3 days, this is a sign you are likely retaining too much fluid. It is imperative you call our right away with unexplained rapid weight gain.      -Please continue to wear the compression stockings given to you in the hospital at home. This is a way to prevent fluid from building up in your legs.     -No driving or strenuous activity/exercise until cleared by your surgeon.    -Call your doctor if you have shortness of breath, chest pain not relieved by pain medication, dizziness, fever >100.5, or increased redness or drainage from incisions.

## 2023-04-29 NOTE — PROGRESS NOTE ADULT - SUBJECTIVE AND OBJECTIVE BOX
INTERVAL HPI/OVERNIGHT EVENTS:    3/20: AVR/arch replacement/CABG x 1/2nd stage TEVAR, aorto axillary bypass  EF 75%    54yo Male current every day marijuana use with a past medical hx of HTN, type A aortic dissection s/p Dacron grafts, AV resuspension in 2013,CAD s/p CABG x 1 SVG to RCA (5/2013 with Dr. Medina), Seizure disorder (last 7/4/22) presents for a follow up visit for evaluation and management of his aortic pathology. Patient is referred by Dr. Jacqueline Gonsales. Screen failed for Triomphe study     CTa C/A/P 11/30/22 - Status post graft repair of the ascending aorta and portion of aortic arch.  Dissecting aneurysm of the descending thoracic aorta (measuring up to 5.7 cm) and abdominal aorta (3.5 cm infrarenal), similar to the MR angiogram of 9/19/2022. Nonocclusive dissection flap present within the innominate artery, aneurysmal right subclavian artery and proximal right common carotid artery as well as aneurysmal left common iliac artery.    Cath (3/9/23): SVG-RCA patent, remaining vessels luminal irregularities. ACCESS: L radial w/ TR band for hemostasis.     to OR 3/20: intraop - 5L Crystalloid/7 units PRBC, 6unit FFP, 3unit platelet, 15unitc cryo, 1000cc fieba  arrived to ICU on Epi/Levo  postop - severe acidosis and Renal failure - lasix infusion started; bicarb given   EEG post-op and CT Head for poor mentation    CT Head 3/23: no acute intracranial abnormality   post-op Atrial fib - amiodarone & hypoxemia - bronch for pulm toilet  D10 started for noted hypoglycemia   CVVHD/Aquapharesis and later HD as needed - serial sessions for fluid removal to optimize before extubation     Extubated 2/29 to [facility] -    ultimately discharged -     Presented to [facility] 4/27 for syncope v seizure episode at home   Code stroke called - Imaging obtained unremarkable for cranial pathology - concern for endoleak - Esmolol infusion started and transferred to [facility] 4/27    of note - just seen by his neurologist and inc medication dosage recommended based on their exam 4/26    Neuro/Epilepsy consulted - EEG placed; rec CT spine to assess RUE weakness    mental status clear - no witnessed seizures or change in mental status noted     EEG report this am - No epileptiform activity and no significant clinical events occurred  mental status improved    no acute events overnight     ICU Vital Signs Last 24 Hrs  T(C): 37.2 (29 Apr 2023 13:47), Max: 37.4 (29 Apr 2023 08:45)  T(F): 98.9 (29 Apr 2023 13:47), Max: 99.4 (29 Apr 2023 08:45)  HR: 98 (29 Apr 2023 15:00) (87 - 120) sinus   BP: 133/98 (29 Apr 2023 15:00) (114/74 - 150/87)  BP(mean): 112 (29 Apr 2023 15:00) (88 - 118)  RR: 12 (29 Apr 2023 15:00) (12 - 20)  SpO2: 97% (29 Apr 2023 15:00) (95% - 99%) RA    Qtts: None     I&O's Summary    28 Apr 2023 07:01  -  29 Apr 2023 07:00  --------------------------------------------------------  IN: 1240 mL / OUT: 835 mL / NET: 405 mL    29 Apr 2023 07:01  -  29 Apr 2023 15:19  --------------------------------------------------------  IN: 0 mL / OUT: 100 mL / NET: -100 mL    Physical Exam    Heart - regular (-)rub/gallop  Lungs - CTA anterior - no rub/gallop  Abd - (+)BS soft NTND (-)r/r/g  Ext - moving all extremities - no cyanosis/clubbing  Neuro - alert/oriented and interactive - nonfocal   Skin - no rash     LABS:                        10.9   11.73 )-----------( 263      ( 29 Apr 2023 10:14 )             32.6     04-29    132<L>  |  95<L>  |  24<H>  ----------------------------<  66<L>  3.9   |  29  |  1.64<H>    Ca    8.4      29 Apr 2023 10:14  Phos  4.1     04-29  Mg     1.9     04-29    TPro  6.2  /  Alb  2.2<L>  /  TBili  0.4  /  DBili  x   /  AST  11  /  ALT  <5<L>  /  AlkPhos  75  04-29    PT/INR - ( 29 Apr 2023 10:14 )   PT: 14.2 sec;   INR: 1.19     PTT - ( 29 Apr 2023 10:14 )  PTT:30.6 sec    RADIOLOGY & ADDITIONAL STUDIES: reviewed    Patient with complicated operative history Chronic dissection of thoracic aorta/Aortic aneurysm without rupture now s/p complicated operative repair 3/20 - complicated post-op course ultimately discharged - recently seen by neurology and based on symptoms recommended up titration of medications - presenting with Syncope v Seizure - suspected seizure as presentation     1. Neuro  Epilepsy following  - meds titrated   EEG in place - no seizure - to remove EWEG   d/w epilepsy attending - ok for discharge and patient to f/u with outpatient neuro    2. Heme  severe chronic anemia  consent obtained and transfusion 2 UpRBC 4/28   plan guaiac patient - (P); no clinical evidence of acute bleeding noted -   compensated anemia - not tachycardic     maintain glycemic control ; HgA1c 5.2    anticipate transfer to floor and d/c home soon   care coordinators informed of anticipated discharge    DVT and GI prophylaxis    d/w patient/staff and CTS

## 2023-04-29 NOTE — PROGRESS NOTE ADULT - ASSESSMENT
53 year-old male with PMH of HTN, extensive cardiovascular hx. seizures (depakote and vimpat), was admitted to [facility] on 4/27/23 for surgical management of progression of aneurysmal disease.   Epilepsy consulted for concern for seizure that is unclear given inconsistent information related to the fall. Differential diagnosis includes seizure vs syncope. Was found to have RUE weakness associated w/ pain which was resolved. Also his mild asterixis in b/l UE related to metabolic derangement, with some tremor may be an adverse reaction of depakote. His VEEG is benign regarding epileptic discharges and his strength improved on his R arm.    Recommendations:    - Continue Vimpat 100mg Q12hrs    - Continue Depakote 1000mg Q12hrs    - If R arm weakness reoccur he may benefit of spine specialist consult.    - Maintain seizure and fall precautions   - Epilepsy team will sign-off and please contact us PRN with further questions.        The case was discussed w attending Dr. Flores

## 2023-04-29 NOTE — EEG REPORT - NS EEG TEXT BOX
[facility] Department of Neurology  Inpatient Continuous video-Electroencephalogram    Patient Name:	VINCENT MARIE    :	1970  MRN:	[id number]    Study Start Date/Time:  2023, 6:40:34 PM  Study End Date/Time:    Referred by: Dary Flores MD    Brief Clinical History:  VINCENT MARIE is a 53 year old Male; study performed to investigate for seizures or markers of epilepsy.    Diagnosis Code:   R56.9 convulsions/seizure  The live video was: unmonitored.    Pertinent Medications:  Depakote 1 g BID, Vimpat 100mg BID    Acquisition Details:  Electroencephalography was acquired using a minimum of 21 channels on an [employer] system v 9.3.1 with electrode placement according to the standard International 10-20 system following ACNS (American Clinical Neurophysiology Society) guidelines for Long-Term Video EEG monitoring.  Anterior temporal T1 and T2 electrodes were utilized whenever possible.   The [employer] automated spike & seizure detections were all reviewed in detail, in addition to extensive portions of raw EEG.      Day 1: 2023 @ 6:40:34 PM to next morning @ 07:00 am  Background:  continuous, with predominantly alpha/theta frequencies.  Symmetry:  No persistent asymmetries of voltage or frequency.  Posterior Dominant Rhythm:  7-8 Hz symmetric, well-organized, and poorly-modulated.  Organization: Rudimentary.  Voltage:  Normal (20+ uV)  Variability: Yes. 		Reactivity: Yes.  N2 sleep: Symmetric, synchronous spindles and K complexes.  Spontaneous Activity:  No epileptiform discharges.  Periodic/rhythmic activity:  None  Events:  No electrographic seizures or significant clinical events.  Provocations:  Hyperventilation and Photic stimulation: was not performed.    Daily Summary:    Continuous Mild generalized   slowing suggestive of a Mild degree of diffuse or multifocal  dysfunction.  No epileptiform activity and no significant clinical events occurred.      Dary Flores MD  Attending Neurologist, [facility]        Daily Updates (from 07:00 am until 07:00 am):  Day 2 -2023:   Pertinent medications: no change  Background:  continuous, with predominantly alpha/theta frequencies.  Symmetry:  No persistent asymmetries of voltage or frequency.  Posterior Dominant Rhythm:  7-8 Hz symmetric, well-organized, and poorly-modulated.  Organization: Rudimentary.  Voltage:  Normal (20+ uV)  Variability: Yes. 		Reactivity: Yes.  N2 sleep: Symmetric, synchronous spindles and K complexes.  Spontaneous Activity:  No epileptiform discharges.  Periodic/rhythmic activity:  None  Events:  No electrographic seizures or significant clinical events.  Provocations:  Hyperventilation and Photic stimulation: was not performed.    Daily Summary:    Continuous Mild generalized   slowing suggestive of a Mild degree of diffuse or multifocal  dysfunction.  No epileptiform activity and no significant clinical events occurred.      Dary Flores MD  Attending Neurologist, [facility]

## 2023-04-29 NOTE — PROGRESS NOTE ADULT - SUBJECTIVE AND OBJECTIVE BOX
Neurology Progress Note    Interval History:    Patient was seen and examined, no acute event over night.     Medications:  acetaminophen     Tablet . 650 milliGRAM(s) Oral every 6 hours PRN  aspirin  chewable 81 milliGRAM(s) Oral daily  atorvastatin 20 milliGRAM(s) Oral at bedtime  heparin   Injectable 5000 Unit(s) SubCutaneous every 8 hours  lacosamide IVPB 100 milliGRAM(s) IV Intermittent every 12 hours  metoprolol tartrate 12.5 milliGRAM(s) Oral every 6 hours  pantoprazole    Tablet 40 milliGRAM(s) Oral before breakfast  polyethylene glycol 3350 17 Gram(s) Oral daily  sodium chloride 0.9% lock flush 3 milliLiter(s) IV Push every 8 hours  valproate sodium  IVPB 1000 milliGRAM(s) IV Intermittent every 12 hours      Vital Signs Last 24 Hrs  T(C): 37.2 (29 Apr 2023 13:47), Max: 37.4 (29 Apr 2023 08:45)  T(F): 98.9 (29 Apr 2023 13:47), Max: 99.4 (29 Apr 2023 08:45)  HR: 98 (29 Apr 2023 15:00) (87 - 120)  BP: 133/98 (29 Apr 2023 15:00) (114/74 - 150/87)  BP(mean): 112 (29 Apr 2023 15:00) (88 - 118)  RR: 12 (29 Apr 2023 15:00) (12 - 20)  SpO2: 97% (29 Apr 2023 15:00) (95% - 99%)    Parameters below as of 29 Apr 2023 15:00  Patient On (Oxygen Delivery Method): room air      Exam:   AAOx3  III/IV/VI: PERRLA 3mm brisk EOMF No nystagmus (close eyes most times)   V1V2V3: Symmetric, VII: Face appears symmetric VIII: Normal to screening, IX/X: Palate Elevates Symmetrical  XI: Trapezius Symmetric  XII: Tongue midline  Motor:  Power: RUE 5/5, RLE 5/5, LLE 5/5, LUE 5/5  Sensation:  Intact to light touch.   Coordination/Gait:  Finger-nose-finger intact  Reflexes:  DTR: 2+ symmetric all 4 limbs  Plantar responses: Down bilaterally      Labs:  CBC Full  -  ( 29 Apr 2023 10:14 )  WBC Count : 11.73 K/uL  RBC Count : 3.62 M/uL  Hemoglobin : 10.9 g/dL  Hematocrit : 32.6 %  Platelet Count - Automated : 263 K/uL  Mean Cell Volume : 90.1 fl  Mean Cell Hemoglobin : 30.1 pg  Mean Cell Hemoglobin Concentration : 33.4 gm/dL     04-29    132<L>  |  95<L>  |  24<H>  ----------------------------<  66<L>  3.9   |  29  |  1.64<H>    Ca    8.4      29 Apr 2023 10:14  Phos  4.1     04-29  Mg     1.9     04-29    TPro  6.2  /  Alb  2.2<L>  /  TBili  0.4  /  DBili  x   /  AST  11  /  ALT  <5<L>  /  AlkPhos  75  04-29    LIVER FUNCTIONS - ( 29 Apr 2023 10:14 )  Alb: 2.2 g/dL / Pro: 6.2 g/dL / ALK PHOS: 75 U/L / ALT: <5 U/L / AST: 11 U/L / GGT: x           PT/INR - ( 29 Apr 2023 10:14 )   PT: 14.2 sec;   INR: 1.19          PTT - ( 29 Apr 2023 10:14 )  PTT:30.6 sec      RADIOLOGY & ADDITIONAL TESTS:

## 2023-04-29 NOTE — DISCHARGE NOTE PROVIDER - HOSPITAL COURSE
Patient discussed on morning rounds with Dr. Flood     Operation Date:    Primary Surgeon/Attending MD: Dr. Pierre    Referring Physician: transfer from [facility]  _ _ _ _ _ _ _ _ _ _ _ _  HOSPITAL COURSE:  51 YO Male, current every day marijuana use, w/ PMHx of HTN, type A aortic dissection s/p Dacron grafts, AV resuspension in 2013, CAD s/p CABG x 1 SVG to RCA (5/2013 with Dr. Medina), seizure disorder (last episode on 7/4/22) who was admitted for surgical mgmt of progression of aneurysmal disease. Recent admission 3/20-4/14 during which patient underwent replacement of transverse aortic arch, second stage thoracic endovascular aortic repair, aorto-axillary bypass, AV replacement (bio 23mm), and CABG x 1 (SVG-RCA) EF 75% (Ignacio, 3/20). Post op cb lactic acidosis, severe cardiogenic and vasogenic shock, TERY requiring CVVHD and temporary dialysis requirement. Patient presented to [facility] ED 4/27 for syncope vs seizure episode at home, falling onto back of head. Stroke code was called and imaging of head and neck was grossly unremarkable however dissection from EVAR to brachiocephalic/R subclavian artery and prox portion of R carotid and L common iliac with questionable endoleak. Patient was started on esmolol and ER team contacted Dr. Pierre who reported he saw patient in clinic 4/26 and was fine. In ED patient was lethargic with AMS and RUE weakness and difficulty providing history. Decision made to tx to [facility] on esmolol gtt for further workup. Of note patient saw his neurologist (Dr. Delilah Hernandez, [facility] [phone]) 4/26 with facial tremors, upper extremity tremors, had asterixis and tongue fasciculations. Requested ammonia level however patient refused to stay given his cardiology followup. Patient admitted to CTSx team and had Epilepsy team was consulted. Patient loaded with depakote & vimpat, EEG uneventful. Imaging from [facility] reviewed per Dr. Pierre, no acute surgical intervention indicated. 4/28 CT cervical spine negative for fracture, esmolol gtt weaned off. 4/29 patient transferred to [facility]. 4/30 patient cleared for discharge home per  ______.   _ _ _ _ _ _ _ _ _ _ _ _  DISCHARGE PHYSICAL EXAM:  General:  Neuro:  Cardio:  Pulm:  GI/:  Vascular:  Extremities:  Incisions:  _ _ _ _ _ _ _ _ _ _ _ _  REMOVAL CHECKLIST:        [ N/A] Epicardial wires          [ N/A] Stitches/tie downs,   If no, why? ______          [ N/A]  PICC/Midline,   If no, why? ________  _ _ _ _ _ _ _ _ _ _ _ _   MEDICATION DISCHARGE CHECKLIST    CABG        [ ] Aspirin, [  ] Contraindicated, Reason_______________________________        [ ] Plavix, [  ] Contraindicated, Reason_______________________________        [ ] Statin, [  ] Contraindicated, Reason_______________________________        [ ] Lasix , [  ] Contraindicated, Reason_______________________________              Duration: _____        [ ] Beta-Blocker, [  ] Contraindicated, Reason_______________________________       Surgical Valve        [ ] Aspirin, [  ] Contraindicated, Reason_______________________________        [ ] Lasix, [  ] Contraindicated, Reason_______________________________             Duration: _____        [ ] Beta-Blocker, [  ] Contraindicated, Reason_______________________________        TAVR Valve        [ ] Aspirin, [  ] Contraindicated, Reason_______________________________        [ ] Plavix, [ ] Contraindicated, Reason _______________________________        PCI        [ ] Aspirin, [  ] Contraindicated, Reason_______________________________        [ ] Plavix, [ ] Contraindicated, Reason _______________________________        Anticoagulation        [ ] NOAC, [ ] Reason _______________________________              Cost/Insurance barriers addressed: YES/NO         [ ] Coumadin, [ ] Reason _______________________________              Dose:___________              INR Goal: ___________              Follow up established: YES/NO  _ _ _ _ _ _ _ _ _ _ _ _  RELEVANT LABS/IMAGING:    _ _ _ _ _ _ _ _ _ _ _ _  CLINICAL FOLLOW UP NEEDS:     [ ] Labwork           Labs needed:           When/Timing:           Outpatient team aware: YES/NO         [ ] Imaging            Type:           When/Timing:           Outpatient team aware: YES/NO       [ ] Home equipment           Type: (i.e. wound vac, pneumostat, prevena, wet/dry dressings, picc/midlines, MCOT, coulter etc)           Specific needs:           Outpatient team aware: YES/NO  _ _ _ _ _ _ _ _ _ _ _ _  Over 35 minutes was spent with the patient reviewing the discharge material including medications, follow up appointments, recovery, concerning symptoms, and how to contact their health care providers if they have questions   Patient discussed on morning rounds with Dr. Flood     Operation Date: Re-admitted w/ syncope vs seizure  3/20/23: replacement of transverse aortic arch, second stage thoracic endovascular aortic repair, aorto-axillary bypass, AV replacement (bio 23mm), and CABG x 1 (SVG-RCA)    Primary Surgeon/Attending MD: Dr. Pierre    Referring Physician: transfer from [facility]  _ _ _ _ _ _ _ _ _ _ _ _  HOSPITAL COURSE:  51 YO Male, current every day marijuana use, w/ PMHx of HTN, type A aortic dissection s/p Dacron grafts, AV resuspension in 2013, CAD s/p CABG x 1 SVG to RCA (5/2013 with Dr. Medina), seizure disorder (last episode on 7/4/22) who was admitted for surgical mgmt of progression of aneurysmal disease. Recent admission 3/20-4/14 during which patient underwent replacement of transverse aortic arch, second stage thoracic endovascular aortic repair, aorto-axillary bypass, AV replacement (bio 23mm), and CABG x 1 (SVG-RCA) EF 75% (Ignacio, 3/20). Post op cb lactic acidosis, severe cardiogenic and vasogenic shock, TREY requiring CVVHD and temporary dialysis requirement. Patient presented to [facility] ED 4/27 for syncope vs seizure episode at home, falling onto back of head. Stroke code was called and imaging of head and neck was grossly unremarkable however dissection from EVAR to brachiocephalic/R subclavian artery and prox portion of R carotid and L common iliac with questionable endoleak. Patient was started on esmolol and ER team contacted Dr. Pierre who reported he saw patient in clinic 4/26 and was fine. In ED patient was lethargic with AMS and RUE weakness and difficulty providing history. Decision made to tx to [facility] on esmolol gtt for further workup. Of note patient saw his neurologist (Dr. Delilah Hernandez, [facility] [phone]) 4/26 with facial tremors, upper extremity tremors, had asterixis and tongue fasciculations. Requested ammonia level however patient refused to stay given his cardiology followup. Patient admitted to CTSx team and had Epilepsy team was consulted. Patient loaded with depakote & vimpat, EEG uneventful. Imaging from [facility] reviewed per Dr. Pierre, no acute surgical intervention indicated. 4/28 CT cervical spine negative for fracture, esmolol gtt weaned off. 4/29 patient transferred to [facility]. Neuro rec continue Depakote 1000mg Q12hrs and Vimpat 100mg Q12hrs. 4/30 patient cleared for discharge home per Dr. Flood.     _ _ _ _ _ _ _ _ _ _ _ _  DISCHARGE PHYSICAL EXAM:  GENERAL: NAD, lying in bed comfortably  HEAD:  Atraumatic, Normocephalic  EYES: EOMI, PERRLA, conjunctiva and sclera clear  ENT: Moist mucous membranes  NECK: Supple, No JVD  CHEST/LUNG: CTAB; MSI healing well  HEART: RRR  ABDOMEN: Soft, Nontender, Nondistended.   EXTREMITIES: 2+ Peripheral Pulses, brisk capillary refill. No clubbing, cyanosis, or edema  NERVOUS SYSTEM:  Alert & Oriented X3   _ _ _ _ _ _ _ _ _ _ _ _  REMOVAL CHECKLIST:        [ N/A] Epicardial wires          [ N/A] Stitches/tie downs,   If no, why? ______          [ N/A]  PICC/Midline,   If no, why? ________  _ _ _ _ _ _ _ _ _ _ _ _   MEDICATION DISCHARGE CHECKLIST    CABG/Surgical Valve        [X ] Aspirin, [  ] Contraindicated, Reason_______________________________        [ ] Plavix, [ X ] Contraindicated, Reason_______________________________        [X ] Statin, [  ] Contraindicated, Reason_______________________________        [ ] Lasix , [X  ] Contraindicated, Reason______________________________              Duration: _____        [X ] Beta-Blocker, [  ] Contraindicated, Reason_______________________________    _ _ _ _ _ _ _ _ _ _ _ _  Over 35 minutes was spent with the patient reviewing the discharge material including medications, follow up appointments, recovery, concerning symptoms, and how to contact their health care providers if they have questions   Patient discussed on morning rounds with Dr. Flood     Operation Date: Re-admitted w/ syncope vs seizure  3/20/23: replacement of transverse aortic arch, second stage thoracic endovascular aortic repair, aorto-axillary bypass, AV replacement (bio 23mm), and CABG x 1 (SVG-RCA)    Primary Surgeon/Attending MD: Dr. Pierre    Referring Physician: transfer from [facility]  _ _ _ _ _ _ _ _ _ _ _ _  HOSPITAL COURSE:  51 YO Male, current every day marijuana use, w/ PMHx of HTN, type A aortic dissection s/p Dacron grafts, AV resuspension in 2013, CAD s/p CABG x 1 SVG to RCA (5/2013 with Dr. Medina), seizure disorder (last episode on 7/4/22) who was admitted for surgical mgmt of progression of aneurysmal disease. Recent admission 3/20-4/14 during which patient underwent replacement of transverse aortic arch, second stage thoracic endovascular aortic repair, aorto-axillary bypass, AV replacement (bio 23mm), and CABG x 1 (SVG-RCA) EF 75% (Ignacio, 3/20). Post op cb lactic acidosis, severe cardiogenic and vasogenic shock, TREY requiring CVVHD and temporary dialysis requirement. Patient presented to [facility] ED 4/27 for syncope vs seizure episode at home, falling onto back of head. Stroke code was called and imaging of head and neck was grossly unremarkable however dissection from EVAR to brachiocephalic/R subclavian artery and prox portion of R carotid and L common iliac with questionable endoleak. Patient was started on esmolol and ER team contacted Dr. Pierre who reported he saw patient in clinic 4/26 and was fine. In ED patient was lethargic with AMS and RUE weakness and difficulty providing history. Decision made to tx to [facility] on esmolol gtt for further workup. Of note patient saw his neurologist (Dr. Delilah Hernandez, [facility] [phone]) 4/26 with facial tremors, upper extremity tremors, had asterixis and tongue fasciculations. Requested ammonia level however patient refused to stay given his cardiology followup. Patient admitted to CTSx team and had Epilepsy team was consulted. Patient loaded with depakote & vimpat, EEG uneventful. Imaging from [facility] reviewed per Dr. Pierre, no acute surgical intervention indicated. 4/28 CT cervical spine negative for fracture, esmolol gtt weaned off. 4/29 patient transferred to [facility]. Neuro rec continue Depakote 1000mg Q12hrs and Vimpat 100mg Q12hrs. 4/30 patient cleared for discharge home per Dr. Flood.   **Patient and wife states that he has enough medication at home and does not need any refills.   _ _ _ _ _ _ _ _ _ _ _ _  DISCHARGE PHYSICAL EXAM:  GENERAL: NAD, lying in bed  HEAD:  Atraumatic, Normocephalic  EYES: EOMI, PERRLA, conjunctiva and sclera clear  ENT: Moist mucous membranes  NECK: Supple, No JVD  CHEST/LUNG: CTAB; MSI healing well, previous drain sites scabbed over.  HEART: RRR  ABDOMEN: Soft, Nontender, Nondistended.   EXTREMITIES: L knee healing with large circular scab. Ecchymosis present on medial aspect of RUE, motor and sensation intact. 2+ Peripheral Pulses, no edema  NERVOUS SYSTEM:  Alert & Oriented X3   _ _ _ _ _ _ _ _ _ _ _ _  REMOVAL CHECKLIST:        [ N/A] Epicardial wires          [ N/A] Stitches/tie downs,   If no, why? ______          [ N/A]  PICC/Midline,   If no, why? ________  _ _ _ _ _ _ _ _ _ _ _ _   MEDICATION DISCHARGE CHECKLIST    CABG/Surgical Valve        [X ] Aspirin, [  ] Contraindicated, Reason_______________________________        [ ] Plavix, [ X ] Contraindicated, Reason_______________________________        [X ] Statin, [  ] Contraindicated, Reason_______________________________        [ ] Lasix , [X  ] Contraindicated, Reason______________________________              Duration: _____        [X ] Beta-Blocker, [  ] Contraindicated, Reason_______________________________    _ _ _ _ _ _ _ _ _ _ _ _  Over 35 minutes was spent with the patient reviewing the discharge material including medications, follow up appointments, recovery, concerning symptoms, and how to contact their health care providers if they have questions

## 2023-04-30 ENCOUNTER — TRANSCRIPTION ENCOUNTER (OUTPATIENT)
Age: 53
End: 2023-04-30

## 2023-04-30 VITALS — TEMPERATURE: 97 F

## 2023-04-30 LAB
-  AMIKACIN: SIGNIFICANT CHANGE UP
-  AMOXICILLIN/CLAVULANIC ACID: SIGNIFICANT CHANGE UP
-  AMPICILLIN/SULBACTAM: SIGNIFICANT CHANGE UP
-  AMPICILLIN: SIGNIFICANT CHANGE UP
-  AZTREONAM: SIGNIFICANT CHANGE UP
-  CEFAZOLIN: SIGNIFICANT CHANGE UP
-  CEFEPIME: SIGNIFICANT CHANGE UP
-  CEFOXITIN: SIGNIFICANT CHANGE UP
-  CEFTRIAXONE: SIGNIFICANT CHANGE UP
-  CIPROFLOXACIN: SIGNIFICANT CHANGE UP
-  ERTAPENEM: SIGNIFICANT CHANGE UP
-  GENTAMICIN: SIGNIFICANT CHANGE UP
-  IMIPENEM: SIGNIFICANT CHANGE UP
-  LEVOFLOXACIN: SIGNIFICANT CHANGE UP
-  MEROPENEM: SIGNIFICANT CHANGE UP
-  NITROFURANTOIN: SIGNIFICANT CHANGE UP
-  PIPERACILLIN/TAZOBACTAM: SIGNIFICANT CHANGE UP
-  TOBRAMYCIN: SIGNIFICANT CHANGE UP
-  TRIMETHOPRIM/SULFAMETHOXAZOLE: SIGNIFICANT CHANGE UP
ANION GAP SERPL CALC-SCNC: 9 MMOL/L — SIGNIFICANT CHANGE UP (ref 5–17)
BASOPHILS # BLD AUTO: 0.01 K/UL — SIGNIFICANT CHANGE UP (ref 0–0.2)
BASOPHILS NFR BLD AUTO: 0.1 % — SIGNIFICANT CHANGE UP (ref 0–2)
BUN SERPL-MCNC: 31 MG/DL — HIGH (ref 7–23)
CALCIUM SERPL-MCNC: 7.8 MG/DL — LOW (ref 8.4–10.5)
CHLORIDE SERPL-SCNC: 95 MMOL/L — LOW (ref 96–108)
CO2 SERPL-SCNC: 28 MMOL/L — SIGNIFICANT CHANGE UP (ref 22–31)
CREAT SERPL-MCNC: 1.67 MG/DL — HIGH (ref 0.5–1.3)
CULTURE RESULTS: SIGNIFICANT CHANGE UP
EGFR: 48 ML/MIN/1.73M2 — LOW
EOSINOPHIL # BLD AUTO: 0.01 K/UL — SIGNIFICANT CHANGE UP (ref 0–0.5)
EOSINOPHIL NFR BLD AUTO: 0.1 % — SIGNIFICANT CHANGE UP (ref 0–6)
GLUCOSE BLDC GLUCOMTR-MCNC: 77 MG/DL — SIGNIFICANT CHANGE UP (ref 70–99)
GLUCOSE BLDC GLUCOMTR-MCNC: 90 MG/DL — SIGNIFICANT CHANGE UP (ref 70–99)
GLUCOSE SERPL-MCNC: 80 MG/DL — SIGNIFICANT CHANGE UP (ref 70–99)
HCT VFR BLD CALC: 29 % — LOW (ref 39–50)
HGB BLD-MCNC: 9.6 G/DL — LOW (ref 13–17)
IMM GRANULOCYTES NFR BLD AUTO: 0.6 % — SIGNIFICANT CHANGE UP (ref 0–0.9)
LYMPHOCYTES # BLD AUTO: 1.16 K/UL — SIGNIFICANT CHANGE UP (ref 1–3.3)
LYMPHOCYTES # BLD AUTO: 12.8 % — LOW (ref 13–44)
MAGNESIUM SERPL-MCNC: 2 MG/DL — SIGNIFICANT CHANGE UP (ref 1.6–2.6)
MCHC RBC-ENTMCNC: 29.9 PG — SIGNIFICANT CHANGE UP (ref 27–34)
MCHC RBC-ENTMCNC: 33.1 GM/DL — SIGNIFICANT CHANGE UP (ref 32–36)
MCV RBC AUTO: 90.3 FL — SIGNIFICANT CHANGE UP (ref 80–100)
METHOD TYPE: SIGNIFICANT CHANGE UP
MONOCYTES # BLD AUTO: 1.29 K/UL — HIGH (ref 0–0.9)
MONOCYTES NFR BLD AUTO: 14.3 % — HIGH (ref 2–14)
NEUTROPHILS # BLD AUTO: 6.53 K/UL — SIGNIFICANT CHANGE UP (ref 1.8–7.4)
NEUTROPHILS NFR BLD AUTO: 72.1 % — SIGNIFICANT CHANGE UP (ref 43–77)
NRBC # BLD: 0 /100 WBCS — SIGNIFICANT CHANGE UP (ref 0–0)
ORGANISM # SPEC MICROSCOPIC CNT: SIGNIFICANT CHANGE UP
ORGANISM # SPEC MICROSCOPIC CNT: SIGNIFICANT CHANGE UP
PHENYTOIN FREE SERPL-MCNC: <0.5 MG/L — LOW (ref 1–2)
PLATELET # BLD AUTO: 263 K/UL — SIGNIFICANT CHANGE UP (ref 150–400)
POTASSIUM SERPL-MCNC: 3.8 MMOL/L — SIGNIFICANT CHANGE UP (ref 3.5–5.3)
POTASSIUM SERPL-SCNC: 3.8 MMOL/L — SIGNIFICANT CHANGE UP (ref 3.5–5.3)
RBC # BLD: 3.21 M/UL — LOW (ref 4.2–5.8)
RBC # FLD: 14.4 % — SIGNIFICANT CHANGE UP (ref 10.3–14.5)
SODIUM SERPL-SCNC: 132 MMOL/L — LOW (ref 135–145)
SPECIMEN SOURCE: SIGNIFICANT CHANGE UP
VALPROATE FREE SERPL-MCNC: 19.8 MG/L — HIGH (ref 4.8–17.3)
WBC # BLD: 9.05 K/UL — SIGNIFICANT CHANGE UP (ref 3.8–10.5)
WBC # FLD AUTO: 9.05 K/UL — SIGNIFICANT CHANGE UP (ref 3.8–10.5)

## 2023-04-30 PROCEDURE — 80185 ASSAY OF PHENYTOIN TOTAL: CPT

## 2023-04-30 PROCEDURE — 85730 THROMBOPLASTIN TIME PARTIAL: CPT

## 2023-04-30 PROCEDURE — 86923 COMPATIBILITY TEST ELECTRIC: CPT

## 2023-04-30 PROCEDURE — 86900 BLOOD TYPING SEROLOGIC ABO: CPT

## 2023-04-30 PROCEDURE — 86850 RBC ANTIBODY SCREEN: CPT

## 2023-04-30 PROCEDURE — 36415 COLL VENOUS BLD VENIPUNCTURE: CPT

## 2023-04-30 PROCEDURE — C9254: CPT

## 2023-04-30 PROCEDURE — 85027 COMPLETE CBC AUTOMATED: CPT

## 2023-04-30 PROCEDURE — 84300 ASSAY OF URINE SODIUM: CPT

## 2023-04-30 PROCEDURE — 80164 ASSAY DIPROPYLACETIC ACD TOT: CPT

## 2023-04-30 PROCEDURE — 93005 ELECTROCARDIOGRAM TRACING: CPT

## 2023-04-30 PROCEDURE — 84146 ASSAY OF PROLACTIN: CPT

## 2023-04-30 PROCEDURE — 83036 HEMOGLOBIN GLYCOSYLATED A1C: CPT

## 2023-04-30 PROCEDURE — 97161 PT EVAL LOW COMPLEX 20 MIN: CPT

## 2023-04-30 PROCEDURE — P9016: CPT

## 2023-04-30 PROCEDURE — 83605 ASSAY OF LACTIC ACID: CPT

## 2023-04-30 PROCEDURE — 95700 EEG CONT REC W/VID EEG TECH: CPT

## 2023-04-30 PROCEDURE — 80165 DIPROPYLACETIC ACID FREE: CPT

## 2023-04-30 PROCEDURE — 85025 COMPLETE CBC W/AUTO DIFF WBC: CPT

## 2023-04-30 PROCEDURE — 80053 COMPREHEN METABOLIC PANEL: CPT

## 2023-04-30 PROCEDURE — 82550 ASSAY OF CK (CPK): CPT

## 2023-04-30 PROCEDURE — 72125 CT NECK SPINE W/O DYE: CPT

## 2023-04-30 PROCEDURE — 81001 URINALYSIS AUTO W/SCOPE: CPT

## 2023-04-30 PROCEDURE — 95708 EEG WO VID EA 12-26HR UNMNTR: CPT

## 2023-04-30 PROCEDURE — 84100 ASSAY OF PHOSPHORUS: CPT

## 2023-04-30 PROCEDURE — 82570 ASSAY OF URINE CREATININE: CPT

## 2023-04-30 PROCEDURE — 36430 TRANSFUSION BLD/BLD COMPNT: CPT

## 2023-04-30 PROCEDURE — 71045 X-RAY EXAM CHEST 1 VIEW: CPT

## 2023-04-30 PROCEDURE — 80048 BASIC METABOLIC PNL TOTAL CA: CPT

## 2023-04-30 PROCEDURE — 85610 PROTHROMBIN TIME: CPT

## 2023-04-30 PROCEDURE — 83735 ASSAY OF MAGNESIUM: CPT

## 2023-04-30 PROCEDURE — 82140 ASSAY OF AMMONIA: CPT

## 2023-04-30 PROCEDURE — 84145 PROCALCITONIN (PCT): CPT

## 2023-04-30 PROCEDURE — 86901 BLOOD TYPING SEROLOGIC RH(D): CPT

## 2023-04-30 PROCEDURE — 87040 BLOOD CULTURE FOR BACTERIA: CPT

## 2023-04-30 PROCEDURE — 82962 GLUCOSE BLOOD TEST: CPT

## 2023-04-30 PROCEDURE — 80186 ASSAY OF PHENYTOIN FREE: CPT

## 2023-04-30 RX ORDER — IBUPROFEN 200 MG
400 TABLET ORAL ONCE
Refills: 0 | Status: COMPLETED | OUTPATIENT
Start: 2023-04-30 | End: 2023-04-30

## 2023-04-30 RX ORDER — POTASSIUM CHLORIDE 20 MEQ
20 PACKET (EA) ORAL ONCE
Refills: 0 | Status: COMPLETED | OUTPATIENT
Start: 2023-04-30 | End: 2023-04-30

## 2023-04-30 RX ORDER — METOPROLOL TARTRATE 50 MG
50 TABLET ORAL ONCE
Refills: 0 | Status: COMPLETED | OUTPATIENT
Start: 2023-04-30 | End: 2023-04-30

## 2023-04-30 RX ORDER — LANOLIN ALCOHOL/MO/W.PET/CERES
5 CREAM (GRAM) TOPICAL AT BEDTIME
Refills: 0 | Status: DISCONTINUED | OUTPATIENT
Start: 2023-04-30 | End: 2023-04-30

## 2023-04-30 RX ORDER — FENTANYL CITRATE 50 UG/ML
25 INJECTION INTRAVENOUS
Refills: 0 | Status: DISCONTINUED | OUTPATIENT
Start: 2023-04-30 | End: 2023-04-30

## 2023-04-30 RX ADMIN — Medication 400 MILLIGRAM(S): at 08:36

## 2023-04-30 RX ADMIN — LACOSAMIDE 120 MILLIGRAM(S): 50 TABLET ORAL at 11:38

## 2023-04-30 RX ADMIN — Medication 50 MILLIGRAM(S): at 08:38

## 2023-04-30 RX ADMIN — HEPARIN SODIUM 5000 UNIT(S): 5000 INJECTION INTRAVENOUS; SUBCUTANEOUS at 05:31

## 2023-04-30 RX ADMIN — Medication 12.5 MILLIGRAM(S): at 13:49

## 2023-04-30 RX ADMIN — SODIUM CHLORIDE 3 MILLILITER(S): 9 INJECTION INTRAMUSCULAR; INTRAVENOUS; SUBCUTANEOUS at 13:25

## 2023-04-30 RX ADMIN — PANTOPRAZOLE SODIUM 40 MILLIGRAM(S): 20 TABLET, DELAYED RELEASE ORAL at 06:32

## 2023-04-30 RX ADMIN — Medication 650 MILLIGRAM(S): at 07:00

## 2023-04-30 RX ADMIN — Medication 60 MILLIGRAM(S): at 10:10

## 2023-04-30 RX ADMIN — Medication 20 MILLIEQUIVALENT(S): at 07:57

## 2023-04-30 RX ADMIN — Medication 400 MILLIGRAM(S): at 07:57

## 2023-04-30 RX ADMIN — SODIUM CHLORIDE 3 MILLILITER(S): 9 INJECTION INTRAMUSCULAR; INTRAVENOUS; SUBCUTANEOUS at 05:37

## 2023-04-30 RX ADMIN — Medication 12.5 MILLIGRAM(S): at 05:31

## 2023-04-30 RX ADMIN — HEPARIN SODIUM 5000 UNIT(S): 5000 INJECTION INTRAVENOUS; SUBCUTANEOUS at 13:50

## 2023-04-30 RX ADMIN — Medication 81 MILLIGRAM(S): at 11:38

## 2023-04-30 RX ADMIN — Medication 650 MILLIGRAM(S): at 05:54

## 2023-04-30 RX ADMIN — Medication 5 MILLIGRAM(S): at 02:04

## 2023-04-30 NOTE — PHYSICAL THERAPY INITIAL EVALUATION ADULT - PERTINENT HX OF CURRENT PROBLEM, REHAB EVAL
52 year old male presented to [facility] ED 4/27 for syncope vs seizure episode at home, falling onto back of head. Stroke code was called and imaging of head and neck was grossly unremarkable however dissection from EVAR to brachiocephalic/R subclavian artery and prox portion of R carotid and L common iliac with questionable endoleak. Patient was started on esmolol and ER team contacted Dr. Pierre who reported he saw patient in clinic 4/26 and was fine. In ED patient was lethargic with AMS and RUE weakness and difficulty providing history. Decision made to tx to [facility] on esmolol gtt for further workup.Patient admitted to CTSx team and had Epilepsy team was consulted. Patient loaded with depakote & vimpat, EEG uneventful. Imaging at [facility] reviewed per Dr. Pierre, no acute surgical intervention indicated. 4/28 CT cervical spine negative for fracture.

## 2023-04-30 NOTE — DISCHARGE NOTE NURSING/CASE MANAGEMENT/SOCIAL WORK - NSDCPEFALRISK_GEN_ALL_CORE
For information on Fall & Injury Prevention, visit: [email or web address] OR  [email or web address] OR  https://www.cdc.gov/steadi/patient.html

## 2023-04-30 NOTE — PATIENT PROFILE ADULT - FALL HARM RISK - UNIVERSAL INTERVENTIONS
Bed in lowest position, wheels locked, appropriate side rails in place/Call bell, personal items and telephone in reach/Instruct patient to call for assistance before getting out of bed or chair/Non-slip footwear when patient is out of bed/[place] to call system/Physically safe environment - no spills, clutter or unnecessary equipment/Purposeful Proactive Rounding/Room/bathroom lighting operational, light cord in reach

## 2023-04-30 NOTE — DISCHARGE NOTE NURSING/CASE MANAGEMENT/SOCIAL WORK - PATIENT PORTAL LINK FT
You can access the FollowMyHealth Patient Portal offered by [facility] by registering at the following website: [email or web address]. By joining [employer]’s FollowMyHealth portal, you will also be able to view your health information using other applications (apps) compatible with our system.

## 2023-05-01 NOTE — ED POST DISCHARGE NOTE - RESULT SUMMARY
UCX: Klebsiella oxytoca 50,000-99,000CFU/ml . No antibiotic listed in ED provider note or prescription writer at time of discharge message left with Call Back  P.A. number and hours for return call back.

## 2023-05-02 ENCOUNTER — NON-APPOINTMENT (OUTPATIENT)
Age: 53
End: 2023-05-02

## 2023-05-02 LAB
CULTURE RESULTS: SIGNIFICANT CHANGE UP
CULTURE RESULTS: SIGNIFICANT CHANGE UP
SPECIMEN SOURCE: SIGNIFICANT CHANGE UP
SPECIMEN SOURCE: SIGNIFICANT CHANGE UP

## 2023-05-04 ENCOUNTER — EMERGENCY (EMERGENCY)
Facility: HOSPITAL | Age: 53
LOS: 0 days | Discharge: ANOTHER HOSPITAL | End: 2023-05-05
Attending: EMERGENCY MEDICINE

## 2023-05-04 ENCOUNTER — TRANSCRIPTION ENCOUNTER (OUTPATIENT)
Age: 53
End: 2023-05-04

## 2023-05-04 VITALS
DIASTOLIC BLOOD PRESSURE: 100 MMHG | OXYGEN SATURATION: 100 % | HEART RATE: 114 BPM | SYSTOLIC BLOOD PRESSURE: 138 MMHG | WEIGHT: 160.06 LBS | TEMPERATURE: 98 F | RESPIRATION RATE: 21 BRPM | HEIGHT: 72 IN
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
## 2023-05-04 DIAGNOSIS — I25.10 ATHEROSCLEROTIC HEART DISEASE OF NATIVE CORONARY ARTERY WITHOUT ANGINA PECTORIS: ICD-10-CM

## 2023-05-04 DIAGNOSIS — R55 SYNCOPE AND COLLAPSE: ICD-10-CM

## 2023-05-04 DIAGNOSIS — R10.13 EPIGASTRIC PAIN: ICD-10-CM

## 2023-05-04 DIAGNOSIS — D64.9 ANEMIA, UNSPECIFIED: ICD-10-CM

## 2023-05-04 DIAGNOSIS — G93.40 ENCEPHALOPATHY, UNSPECIFIED: ICD-10-CM

## 2023-05-04 DIAGNOSIS — Z79.82 LONG TERM (CURRENT) USE OF ASPIRIN: ICD-10-CM

## 2023-05-04 DIAGNOSIS — N17.9 ACUTE KIDNEY FAILURE, UNSPECIFIED: ICD-10-CM

## 2023-05-04 DIAGNOSIS — R27.8 OTHER LACK OF COORDINATION: ICD-10-CM

## 2023-05-04 DIAGNOSIS — Z20.822 CONTACT WITH AND (SUSPECTED) EXPOSURE TO COVID-19: ICD-10-CM

## 2023-05-04 DIAGNOSIS — G40.909 EPILEPSY, UNSPECIFIED, NOT INTRACTABLE, WITHOUT STATUS EPILEPTICUS: ICD-10-CM

## 2023-05-04 DIAGNOSIS — I10 ESSENTIAL (PRIMARY) HYPERTENSION: ICD-10-CM

## 2023-05-04 DIAGNOSIS — F12.99 CANNABIS USE, UNSPECIFIED WITH UNSPECIFIED CANNABIS-INDUCED DISORDER: ICD-10-CM

## 2023-05-04 DIAGNOSIS — Z95.3 PRESENCE OF XENOGENIC HEART VALVE: ICD-10-CM

## 2023-05-04 DIAGNOSIS — R41.82 ALTERED MENTAL STATUS, UNSPECIFIED: ICD-10-CM

## 2023-05-04 DIAGNOSIS — T50.8X5A ADVERSE EFFECT OF DIAGNOSTIC AGENTS, INITIAL ENCOUNTER: ICD-10-CM

## 2023-05-04 DIAGNOSIS — N14.11 CONTRAST-INDUCED NEPHROPATHY: ICD-10-CM

## 2023-05-04 DIAGNOSIS — Z95.1 PRESENCE OF AORTOCORONARY BYPASS GRAFT: ICD-10-CM

## 2023-05-04 DIAGNOSIS — Z86.59 PERSONAL HISTORY OF OTHER MENTAL AND BEHAVIORAL DISORDERS: ICD-10-CM

## 2023-05-04 DIAGNOSIS — G40.109 LOCALIZATION-RELATED (FOCAL) (PARTIAL) SYMPTOMATIC EPILEPSY AND EPILEPTIC SYNDROMES WITH SIMPLE PARTIAL SEIZURES, NOT INTRACTABLE, WITHOUT STATUS EPILEPTICUS: ICD-10-CM

## 2023-05-04 DIAGNOSIS — M48.02 SPINAL STENOSIS, CERVICAL REGION: ICD-10-CM

## 2023-05-04 DIAGNOSIS — R29.898 OTHER SYMPTOMS AND SIGNS INVOLVING THE MUSCULOSKELETAL SYSTEM: ICD-10-CM

## 2023-05-04 DIAGNOSIS — Z95.828 PRESENCE OF OTHER VASCULAR IMPLANTS AND GRAFTS: Chronic | ICD-10-CM

## 2023-05-04 DIAGNOSIS — Z86.79 PERSONAL HISTORY OF OTHER DISEASES OF THE CIRCULATORY SYSTEM: ICD-10-CM

## 2023-05-04 DIAGNOSIS — Z95.1 PRESENCE OF AORTOCORONARY BYPASS GRAFT: Chronic | ICD-10-CM

## 2023-05-04 LAB
ALBUMIN SERPL ELPH-MCNC: 1.7 G/DL — LOW (ref 3.3–5)
ALP SERPL-CCNC: 66 U/L — SIGNIFICANT CHANGE UP (ref 40–120)
ALT FLD-CCNC: 10 U/L — LOW (ref 12–78)
AMMONIA BLD-MCNC: 27 UMOL/L — SIGNIFICANT CHANGE UP (ref 11–32)
ANION GAP SERPL CALC-SCNC: 3 MMOL/L — LOW (ref 5–17)
AST SERPL-CCNC: 12 U/L — LOW (ref 15–37)
BILIRUB SERPL-MCNC: 0.3 MG/DL — SIGNIFICANT CHANGE UP (ref 0.2–1.2)
BLD GP AB SCN SERPL QL: SIGNIFICANT CHANGE UP
BUN SERPL-MCNC: 30 MG/DL — HIGH (ref 7–23)
CALCIUM SERPL-MCNC: 8.1 MG/DL — LOW (ref 8.5–10.1)
CHLORIDE SERPL-SCNC: 100 MMOL/L — SIGNIFICANT CHANGE UP (ref 96–108)
CO2 SERPL-SCNC: 30 MMOL/L — SIGNIFICANT CHANGE UP (ref 22–31)
CREAT SERPL-MCNC: 1.83 MG/DL — HIGH (ref 0.5–1.3)
EGFR: 43 ML/MIN/1.73M2 — LOW
GLUCOSE SERPL-MCNC: 93 MG/DL — SIGNIFICANT CHANGE UP (ref 70–99)
HCT VFR BLD CALC: 28.2 % — LOW (ref 39–50)
HGB BLD-MCNC: 9.1 G/DL — LOW (ref 13–17)
LACTATE SERPL-SCNC: 1.3 MMOL/L — SIGNIFICANT CHANGE UP (ref 0.7–2)
LIDOCAIN IGE QN: 1145 U/L — HIGH (ref 73–393)
MCHC RBC-ENTMCNC: 29.8 PG — SIGNIFICANT CHANGE UP (ref 27–34)
MCHC RBC-ENTMCNC: 32.3 G/DL — SIGNIFICANT CHANGE UP (ref 32–36)
MCV RBC AUTO: 92.5 FL — SIGNIFICANT CHANGE UP (ref 80–100)
PLATELET # BLD AUTO: 283 K/UL — SIGNIFICANT CHANGE UP (ref 150–400)
POTASSIUM SERPL-MCNC: 4.1 MMOL/L — SIGNIFICANT CHANGE UP (ref 3.5–5.3)
POTASSIUM SERPL-SCNC: 4.1 MMOL/L — SIGNIFICANT CHANGE UP (ref 3.5–5.3)
PROT SERPL-MCNC: 6.2 GM/DL — SIGNIFICANT CHANGE UP (ref 6–8.3)
RBC # BLD: 3.05 M/UL — LOW (ref 4.2–5.8)
RBC # FLD: 14.5 % — SIGNIFICANT CHANGE UP (ref 10.3–14.5)
SODIUM SERPL-SCNC: 133 MMOL/L — LOW (ref 135–145)
TROPONIN I, HIGH SENSITIVITY RESULT: 15.6 NG/L — SIGNIFICANT CHANGE UP
WBC # BLD: 9.94 K/UL — SIGNIFICANT CHANGE UP (ref 3.8–10.5)
WBC # FLD AUTO: 9.94 K/UL — SIGNIFICANT CHANGE UP (ref 3.8–10.5)

## 2023-05-04 RX ORDER — MORPHINE SULFATE 50 MG/1
6 CAPSULE, EXTENDED RELEASE ORAL ONCE
Refills: 0 | Status: DISCONTINUED | OUTPATIENT
Start: 2023-05-04 | End: 2023-05-04

## 2023-05-04 RX ORDER — MORPHINE SULFATE 50 MG/1
4 CAPSULE, EXTENDED RELEASE ORAL ONCE
Refills: 0 | Status: DISCONTINUED | OUTPATIENT
Start: 2023-05-04 | End: 2023-05-04

## 2023-05-04 RX ORDER — SODIUM CHLORIDE 9 MG/ML
500 INJECTION INTRAMUSCULAR; INTRAVENOUS; SUBCUTANEOUS ONCE
Refills: 0 | Status: COMPLETED | OUTPATIENT
Start: 2023-05-04 | End: 2023-05-04

## 2023-05-04 RX ADMIN — MORPHINE SULFATE 4 MILLIGRAM(S): 50 CAPSULE, EXTENDED RELEASE ORAL at 22:49

## 2023-05-04 RX ADMIN — SODIUM CHLORIDE 500 MILLILITER(S): 9 INJECTION INTRAMUSCULAR; INTRAVENOUS; SUBCUTANEOUS at 22:13

## 2023-05-04 RX ADMIN — MORPHINE SULFATE 4 MILLIGRAM(S): 50 CAPSULE, EXTENDED RELEASE ORAL at 22:19

## 2023-05-04 NOTE — ED ADULT NURSE NOTE - ED STAT RN HANDOFF DETAILS
Report given to Delmer TRISTAN at [facility] CTICU Stepdown- [facility]. RN made aware of patient surgical history and HPI, endoleak and increase in aneurysm size, medications given, AMS, IV access, EKG results, and current vital signs.

## 2023-05-04 NOTE — ED PROVIDER NOTE - CLINICAL SUMMARY MEDICAL DECISION MAKING FREE TEXT BOX
52 yo M with AMS, epigastric pain, concerning for dissection/endoleak, acs, acute CVA less likely, possibly post ictal from seizure, doubt pancreatitis, ischemia, metabolic encephalopathy   -CTA neck/chest/abd/pel, CT brain, cxr, ekg, iv, basic labs, type and screen, ammonia, ua, cx, lactate, lipase, rvp, tsh, trop, small hydration bolus, morphine, npo, monitor  -f/u results, reeval

## 2023-05-04 NOTE — ED ADULT NURSE NOTE - OBJECTIVE STATEMENT
pt is AOx3 but sleepy, pt came in complaining of generalized abdominal pain. pts reports having excruciating abdominal pain that started at 1800 tonight. when asking pt about the pain, refusing to answer questions, pt points at his abdomen and winces. pt denies any N/V/D/constipation at this time. pt is unsure of last BM. pt denies urinary complaints. pt rates his pain a 9/10, pt denies taking medication prior to arrival.

## 2023-05-04 NOTE — ED PROVIDER NOTE - PROGRESS NOTE DETAILS
called transfer center to discuss endoleak/increase in aneurysm size\  I explained to pt. in detail why we are transferring him for eval by his surgeon  pt. accepts plan of care, asked wife to sign consent for transfer for him  Dr. Pierre Accepts to [facility], EMS en route within hour\  pt. comfortable and stable at this time
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
## 2023-05-04 NOTE — ED PROVIDER NOTE - OBJECTIVE STATEMENT
54 yo M with abd pain for hours, worse over last hour, prompting EMS call.  Pt. reports epigastric abd pain.  Pt. is 2 months post thoracic aneurism repair.  Pt's has been having episodes of AMS, which he was recently seen for at [facility].  No other complaints/trauma/inciting event.  Pt. is drowsy but responsive to questions with slow responses--friend at bedside notes this is not uncommon over the last few weeks for him.  No explanation for behavior thus far.   ROS: negative for fever, cough, headache, chest pain, shortness of breath, nausea, vomiting, diarrhea, rash, paresthesia, and weakness--all other systems reviewed are negative.   PMH:  HTN, Sz, type A aortic dissection s/p Dacron grafts, AV resuspension in 2013, CAD s/p CABG x 1 SVG to RCA (5/2013 with Dr. Medina), seizure disorder (last episode on 7/4/22) who was admitted for surgical mgmt of progression of aneurysmal disease. Recent admission 3/20/23-4/14/23 during which patient underwent replacement of transverse aortic arch, second stage thoracic endovascular aortic repair, aorto-axillary bypass, AV replacement (bio 23mm), and CABG x 1 (SVG-RCA) EF 75% ([facility], 3/20). Post op cb lactic acidosis, severe cardiogenic and vasogenic shock, TREY requiring CVVHD and temporary dialysis requirement, [facility] ED 4/27 for syncope vs seizure episode at home, prior dissection from EVAR to brachiocephalic/R subclavian artery and prox portion of R carotid and L common iliac with questionable endoleak, neurologist (Dr. Delilah Hernandez, [facility] [phone]) 4/26 with facial tremors, upper extremity tremors, had asterixis and tongue fasciculations; Meds: See EMR for list; SH: + chronic THC smoking

## 2023-05-04 NOTE — ED ADULT NURSE NOTE - NSIMPLEMENTINTERV_GEN_ALL_ED
Implemented All Fall with Harm Risk Interventions:  [place] to call system. Call bell, personal items and telephone within reach. Instruct patient to call for assistance. Room bathroom lighting operational. Non-slip footwear when patient is off stretcher. Physically safe environment: no spills, clutter or unnecessary equipment. Stretcher in lowest position, wheels locked, appropriate side rails in place. Provide visual cue, wrist band, yellow gown, etc. Monitor gait and stability. Monitor for mental status changes and reorient to person, place, and time. Review medications for side effects contributing to fall risk. Reinforce activity limits and safety measures with patient and family. Provide visual clues: red socks.

## 2023-05-04 NOTE — ED PROVIDER NOTE - PHYSICAL EXAMINATION
Vitals: HTN at 138/100, tachy at 114, otherwise WNL  Gen: AAOx2, slow to respond to questions, appears confused at times while answering, zoning out and forgetting question, laying uncomfortably in stretcher  Head: ncat, perrla, eomi b/l  Neck: supple, no lymphadenopathy, no midline deviation  Heart: rrr, no m/r/g  Lungs: CTA b/l, no rales/ronchi/wheezes  Abd: soft, nontender, non-distended, no rebound or guarding, vertical incision (thoracoabdominal) and port incisions healing well   Ext: no clubbing/cyanosis/edema  Neuro: sensation and muscle strength intact b/l, moving all 4 ext equally, CN2-12 intact b/l

## 2023-05-04 NOTE — ED CLERICAL - NS ED CARE COORDINATION INFORMATION
This patient is eligible for (or currently enrolled in) an outpatient care management program available through [facility]. This program can coordinate outpatient follow up and assist the patient in accessing a variety of outpatient resources.  If discharged from the ED, the patient will be contacted to see if any additional resources are needed.                                                                                    Please call the Nurse Clinical Call Center at [phone] with any questions or for assistance in discharge planning.

## 2023-05-05 ENCOUNTER — INPATIENT (INPATIENT)
Facility: HOSPITAL | Age: 53
LOS: 89 days | Discharge: NURSING, REHAB OR LONG-TERM CARE | DRG: 3 | End: 2023-08-03
Attending: SURGERY | Admitting: SURGERY
Payer: COMMERCIAL

## 2023-05-05 ENCOUNTER — APPOINTMENT (OUTPATIENT)
Dept: CARDIOTHORACIC SURGERY | Facility: HOSPITAL | Age: 53
End: 2023-05-05
Payer: COMMERCIAL

## 2023-05-05 VITALS
SYSTOLIC BLOOD PRESSURE: 128 MMHG | OXYGEN SATURATION: 94 % | RESPIRATION RATE: 16 BRPM | DIASTOLIC BLOOD PRESSURE: 78 MMHG

## 2023-05-05 VITALS
DIASTOLIC BLOOD PRESSURE: 89 MMHG | TEMPERATURE: 99 F | SYSTOLIC BLOOD PRESSURE: 129 MMHG | RESPIRATION RATE: 15 BRPM | OXYGEN SATURATION: 97 % | HEART RATE: 102 BPM

## 2023-05-05 DIAGNOSIS — K63.89 OTHER SPECIFIED DISEASES OF INTESTINE: ICD-10-CM
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
## 2023-05-05 DIAGNOSIS — I27.20 PULMONARY HYPERTENSION, UNSPECIFIED: ICD-10-CM

## 2023-05-05 DIAGNOSIS — K65.1 PERITONEAL ABSCESS: ICD-10-CM

## 2023-05-05 DIAGNOSIS — G72.81 CRITICAL ILLNESS MYOPATHY: ICD-10-CM

## 2023-05-05 DIAGNOSIS — K29.70 GASTRITIS, UNSPECIFIED, WITHOUT BLEEDING: ICD-10-CM

## 2023-05-05 DIAGNOSIS — Z95.828 PRESENCE OF OTHER VASCULAR IMPLANTS AND GRAFTS: Chronic | ICD-10-CM
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
## 2023-05-05 DIAGNOSIS — T82.310A BREAKDOWN (MECHANICAL) OF AORTIC (BIFURCATION) GRAFT (REPLACEMENT), INITIAL ENCOUNTER: ICD-10-CM

## 2023-05-05 DIAGNOSIS — R57.8 OTHER SHOCK: ICD-10-CM

## 2023-05-05 DIAGNOSIS — I77.1 STRICTURE OF ARTERY: ICD-10-CM

## 2023-05-05 DIAGNOSIS — R65.21 SEVERE SEPSIS WITH SEPTIC SHOCK: ICD-10-CM

## 2023-05-05 DIAGNOSIS — A41.59 OTHER GRAM-NEGATIVE SEPSIS: ICD-10-CM

## 2023-05-05 DIAGNOSIS — K85.92 ACUTE PANCREATITIS WITH INFECTED NECROSIS, UNSPECIFIED: ICD-10-CM

## 2023-05-05 DIAGNOSIS — K56.7 ILEUS, UNSPECIFIED: ICD-10-CM

## 2023-05-05 DIAGNOSIS — Z79.82 LONG TERM (CURRENT) USE OF ASPIRIN: ICD-10-CM

## 2023-05-05 DIAGNOSIS — J69.0 PNEUMONITIS DUE TO INHALATION OF FOOD AND VOMIT: ICD-10-CM

## 2023-05-05 DIAGNOSIS — N17.0 ACUTE KIDNEY FAILURE WITH TUBULAR NECROSIS: ICD-10-CM

## 2023-05-05 DIAGNOSIS — I50.41 ACUTE COMBINED SYSTOLIC (CONGESTIVE) AND DIASTOLIC (CONGESTIVE) HEART FAILURE: ICD-10-CM

## 2023-05-05 DIAGNOSIS — Z87.891 PERSONAL HISTORY OF NICOTINE DEPENDENCE: ICD-10-CM
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
## 2023-05-05 DIAGNOSIS — Z79.899 OTHER LONG TERM (CURRENT) DRUG THERAPY: ICD-10-CM

## 2023-05-05 DIAGNOSIS — R18.8 OTHER ASCITES: ICD-10-CM

## 2023-05-05 DIAGNOSIS — R57.1 HYPOVOLEMIC SHOCK: ICD-10-CM

## 2023-05-05 DIAGNOSIS — E87.6 HYPOKALEMIA: ICD-10-CM

## 2023-05-05 DIAGNOSIS — I26.99 OTHER PULMONARY EMBOLISM WITHOUT ACUTE COR PULMONALE: ICD-10-CM

## 2023-05-05 DIAGNOSIS — I71.010 DISSECTION OF ASCENDING AORTA: ICD-10-CM

## 2023-05-05 DIAGNOSIS — T42.6X5A ADVERSE EFFECT OF OTHER ANTIEPILEPTIC AND SEDATIVE-HYPNOTIC DRUGS, INITIAL ENCOUNTER: ICD-10-CM
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
## 2023-05-05 DIAGNOSIS — K55.9 VASCULAR DISORDER OF INTESTINE, UNSPECIFIED: ICD-10-CM

## 2023-05-05 DIAGNOSIS — R57.0 CARDIOGENIC SHOCK: ICD-10-CM

## 2023-05-05 DIAGNOSIS — R33.9 RETENTION OF URINE, UNSPECIFIED: ICD-10-CM

## 2023-05-05 DIAGNOSIS — F05 DELIRIUM DUE TO KNOWN PHYSIOLOGICAL CONDITION: ICD-10-CM

## 2023-05-05 DIAGNOSIS — J90 PLEURAL EFFUSION, NOT ELSEWHERE CLASSIFIED: ICD-10-CM

## 2023-05-05 DIAGNOSIS — T41.205A ADVERSE EFFECT OF UNSPECIFIED GENERAL ANESTHETICS, INITIAL ENCOUNTER: ICD-10-CM

## 2023-05-05 DIAGNOSIS — G92.8 OTHER TOXIC ENCEPHALOPATHY: ICD-10-CM

## 2023-05-05 DIAGNOSIS — D69.6 THROMBOCYTOPENIA, UNSPECIFIED: ICD-10-CM

## 2023-05-05 DIAGNOSIS — K65.9 PERITONITIS, UNSPECIFIED: ICD-10-CM

## 2023-05-05 DIAGNOSIS — J80 ACUTE RESPIRATORY DISTRESS SYNDROME: ICD-10-CM

## 2023-05-05 DIAGNOSIS — B95.4 OTHER STREPTOCOCCUS AS THE CAUSE OF DISEASES CLASSIFIED ELSEWHERE: ICD-10-CM

## 2023-05-05 DIAGNOSIS — Z95.1 PRESENCE OF AORTOCORONARY BYPASS GRAFT: Chronic | ICD-10-CM

## 2023-05-05 DIAGNOSIS — I25.10 ATHEROSCLEROTIC HEART DISEASE OF NATIVE CORONARY ARTERY WITHOUT ANGINA PECTORIS: ICD-10-CM

## 2023-05-05 DIAGNOSIS — G62.81 CRITICAL ILLNESS POLYNEUROPATHY: ICD-10-CM

## 2023-05-05 DIAGNOSIS — D73.5 INFARCTION OF SPLEEN: ICD-10-CM

## 2023-05-05 DIAGNOSIS — I46.9 CARDIAC ARREST, CAUSE UNSPECIFIED: ICD-10-CM

## 2023-05-05 DIAGNOSIS — E80.7 DISORDER OF BILIRUBIN METABOLISM, UNSPECIFIED: ICD-10-CM

## 2023-05-05 DIAGNOSIS — K52.9 NONINFECTIVE GASTROENTERITIS AND COLITIS, UNSPECIFIED: ICD-10-CM

## 2023-05-05 DIAGNOSIS — D62 ACUTE POSTHEMORRHAGIC ANEMIA: ICD-10-CM

## 2023-05-05 DIAGNOSIS — G40.909 EPILEPSY, UNSPECIFIED, NOT INTRACTABLE, WITHOUT STATUS EPILEPTICUS: ICD-10-CM

## 2023-05-05 DIAGNOSIS — T39.95XA ADVERSE EFFECT OF UNSPECIFIED NONOPIOID ANALGESIC, ANTIPYRETIC AND ANTIRHEUMATIC, INITIAL ENCOUNTER: ICD-10-CM

## 2023-05-05 DIAGNOSIS — B95.2 ENTEROCOCCUS AS THE CAUSE OF DISEASES CLASSIFIED ELSEWHERE: ICD-10-CM

## 2023-05-05 DIAGNOSIS — Z95.1 PRESENCE OF AORTOCORONARY BYPASS GRAFT: ICD-10-CM

## 2023-05-05 DIAGNOSIS — D72.19 OTHER EOSINOPHILIA: ICD-10-CM

## 2023-05-05 DIAGNOSIS — J98.11 ATELECTASIS: ICD-10-CM

## 2023-05-05 DIAGNOSIS — R31.9 HEMATURIA, UNSPECIFIED: ICD-10-CM

## 2023-05-05 DIAGNOSIS — I11.0 HYPERTENSIVE HEART DISEASE WITH HEART FAILURE: ICD-10-CM

## 2023-05-05 DIAGNOSIS — E87.20 ACIDOSIS, UNSPECIFIED: ICD-10-CM

## 2023-05-05 DIAGNOSIS — B96.89 OTHER SPECIFIED BACTERIAL AGENTS AS THE CAUSE OF DISEASES CLASSIFIED ELSEWHERE: ICD-10-CM

## 2023-05-05 DIAGNOSIS — I16.1 HYPERTENSIVE EMERGENCY: ICD-10-CM

## 2023-05-05 DIAGNOSIS — E83.39 OTHER DISORDERS OF PHOSPHORUS METABOLISM: ICD-10-CM

## 2023-05-05 LAB
A1C WITH ESTIMATED AVERAGE GLUCOSE RESULT: 5.3 % — SIGNIFICANT CHANGE UP (ref 4–5.6)
ALBUMIN SERPL ELPH-MCNC: 1.8 G/DL — LOW (ref 3.3–5)
ALBUMIN SERPL ELPH-MCNC: 2.2 G/DL — LOW (ref 3.3–5)
ALBUMIN SERPL ELPH-MCNC: 2.4 G/DL — LOW (ref 3.3–5)
ALP SERPL-CCNC: 57 U/L — SIGNIFICANT CHANGE UP (ref 40–120)
ALP SERPL-CCNC: 59 U/L — SIGNIFICANT CHANGE UP (ref 40–120)
ALP SERPL-CCNC: 71 U/L — SIGNIFICANT CHANGE UP (ref 40–120)
ALT FLD-CCNC: 5 U/L — LOW (ref 10–45)
ALT FLD-CCNC: <5 U/L — LOW (ref 10–45)
ALT FLD-CCNC: <5 U/L — LOW (ref 10–45)
ANION GAP SERPL CALC-SCNC: 7 MMOL/L — SIGNIFICANT CHANGE UP (ref 5–17)
ANION GAP SERPL CALC-SCNC: 8 MMOL/L — SIGNIFICANT CHANGE UP (ref 5–17)
ANION GAP SERPL CALC-SCNC: 8 MMOL/L — SIGNIFICANT CHANGE UP (ref 5–17)
ANISOCYTOSIS BLD QL: SLIGHT — SIGNIFICANT CHANGE UP
ANISOCYTOSIS BLD QL: SLIGHT — SIGNIFICANT CHANGE UP
APPEARANCE UR: CLEAR — SIGNIFICANT CHANGE UP
APTT BLD: 33.8 SEC — SIGNIFICANT CHANGE UP (ref 27.5–35.5)
APTT BLD: 34 SEC — SIGNIFICANT CHANGE UP (ref 27.5–35.5)
AST SERPL-CCNC: 13 U/L — SIGNIFICANT CHANGE UP (ref 10–40)
AST SERPL-CCNC: 16 U/L — SIGNIFICANT CHANGE UP (ref 10–40)
AST SERPL-CCNC: 16 U/L — SIGNIFICANT CHANGE UP (ref 10–40)
BACTERIA # UR AUTO: ABNORMAL /HPF
BASOPHILS # BLD AUTO: 0 K/UL — SIGNIFICANT CHANGE UP (ref 0–0.2)
BASOPHILS # BLD AUTO: 0 K/UL — SIGNIFICANT CHANGE UP (ref 0–0.2)
BASOPHILS # BLD AUTO: 0.03 K/UL — SIGNIFICANT CHANGE UP (ref 0–0.2)
BASOPHILS NFR BLD AUTO: 0 % — SIGNIFICANT CHANGE UP (ref 0–2)
BASOPHILS NFR BLD AUTO: 0 % — SIGNIFICANT CHANGE UP (ref 0–2)
BASOPHILS NFR BLD AUTO: 0.3 % — SIGNIFICANT CHANGE UP (ref 0–2)
BILIRUB SERPL-MCNC: 0.3 MG/DL — SIGNIFICANT CHANGE UP (ref 0.2–1.2)
BILIRUB SERPL-MCNC: 0.3 MG/DL — SIGNIFICANT CHANGE UP (ref 0.2–1.2)
BILIRUB SERPL-MCNC: 0.4 MG/DL — SIGNIFICANT CHANGE UP (ref 0.2–1.2)
BILIRUB UR-MCNC: NEGATIVE — SIGNIFICANT CHANGE UP
BLD GP AB SCN SERPL QL: NEGATIVE — SIGNIFICANT CHANGE UP
BUN SERPL-MCNC: 22 MG/DL — SIGNIFICANT CHANGE UP (ref 7–23)
BUN SERPL-MCNC: 25 MG/DL — HIGH (ref 7–23)
BUN SERPL-MCNC: 27 MG/DL — HIGH (ref 7–23)
CALCIUM SERPL-MCNC: 7.4 MG/DL — LOW (ref 8.4–10.5)
CALCIUM SERPL-MCNC: 7.9 MG/DL — LOW (ref 8.4–10.5)
CALCIUM SERPL-MCNC: 8.4 MG/DL — SIGNIFICANT CHANGE UP (ref 8.4–10.5)
CHLORIDE SERPL-SCNC: 102 MMOL/L — SIGNIFICANT CHANGE UP (ref 96–108)
CHLORIDE SERPL-SCNC: 103 MMOL/L — SIGNIFICANT CHANGE UP (ref 96–108)
CHLORIDE SERPL-SCNC: 98 MMOL/L — SIGNIFICANT CHANGE UP (ref 96–108)
CO2 SERPL-SCNC: 24 MMOL/L — SIGNIFICANT CHANGE UP (ref 22–31)
CO2 SERPL-SCNC: 26 MMOL/L — SIGNIFICANT CHANGE UP (ref 22–31)
CO2 SERPL-SCNC: 29 MMOL/L — SIGNIFICANT CHANGE UP (ref 22–31)
COLOR SPEC: YELLOW — SIGNIFICANT CHANGE UP
CREAT SERPL-MCNC: 1.36 MG/DL — HIGH (ref 0.5–1.3)
CREAT SERPL-MCNC: 1.4 MG/DL — HIGH (ref 0.5–1.3)
CREAT SERPL-MCNC: 1.66 MG/DL — HIGH (ref 0.5–1.3)
DIFF PNL FLD: ABNORMAL
EGFR: 49 ML/MIN/1.73M2 — LOW
EGFR: 60 ML/MIN/1.73M2 — SIGNIFICANT CHANGE UP
EGFR: 62 ML/MIN/1.73M2 — SIGNIFICANT CHANGE UP
EOSINOPHIL # BLD AUTO: 0.09 K/UL — SIGNIFICANT CHANGE UP (ref 0–0.5)
EOSINOPHIL # BLD AUTO: 0.1 K/UL — SIGNIFICANT CHANGE UP (ref 0–0.5)
EOSINOPHIL # BLD AUTO: 0.2 K/UL — SIGNIFICANT CHANGE UP (ref 0–0.5)
EOSINOPHIL NFR BLD AUTO: 0.9 % — SIGNIFICANT CHANGE UP (ref 0–6)
EOSINOPHIL NFR BLD AUTO: 1 % — SIGNIFICANT CHANGE UP (ref 0–6)
EOSINOPHIL NFR BLD AUTO: 1.9 % — SIGNIFICANT CHANGE UP (ref 0–6)
EPI CELLS # UR: SIGNIFICANT CHANGE UP /HPF (ref 0–5)
ESTIMATED AVERAGE GLUCOSE: 105 MG/DL — SIGNIFICANT CHANGE UP (ref 68–114)
GAS PNL BLDA: SIGNIFICANT CHANGE UP
GAS PNL BLDA: SIGNIFICANT CHANGE UP
GLUCOSE BLDC GLUCOMTR-MCNC: 59 MG/DL — LOW (ref 70–99)
GLUCOSE BLDC GLUCOMTR-MCNC: 74 MG/DL — SIGNIFICANT CHANGE UP (ref 70–99)
GLUCOSE BLDC GLUCOMTR-MCNC: 95 MG/DL — SIGNIFICANT CHANGE UP (ref 70–99)
GLUCOSE SERPL-MCNC: 72 MG/DL — SIGNIFICANT CHANGE UP (ref 70–99)
GLUCOSE SERPL-MCNC: 82 MG/DL — SIGNIFICANT CHANGE UP (ref 70–99)
GLUCOSE SERPL-MCNC: 91 MG/DL — SIGNIFICANT CHANGE UP (ref 70–99)
GLUCOSE UR QL: NEGATIVE — SIGNIFICANT CHANGE UP
HCT VFR BLD CALC: 28.6 % — LOW (ref 39–50)
HCT VFR BLD CALC: 29.6 % — LOW (ref 39–50)
HCT VFR BLD CALC: 30.8 % — LOW (ref 39–50)
HGB BLD-MCNC: 10.2 G/DL — LOW (ref 13–17)
HGB BLD-MCNC: 9.1 G/DL — LOW (ref 13–17)
HGB BLD-MCNC: 9.7 G/DL — LOW (ref 13–17)
HYALINE CASTS # UR AUTO: SIGNIFICANT CHANGE UP /LPF (ref 0–2)
HYPOCHROMIA BLD QL: SIGNIFICANT CHANGE UP
HYPOCHROMIA BLD QL: SLIGHT — SIGNIFICANT CHANGE UP
IMM GRANULOCYTES NFR BLD AUTO: 0.9 % — SIGNIFICANT CHANGE UP (ref 0–0.9)
INR BLD: 1.23 — HIGH (ref 0.88–1.16)
INR BLD: 1.32 — HIGH (ref 0.88–1.16)
ISTAT ARTERIAL BE: 6 MMOL/L — HIGH (ref -2–3)
ISTAT ARTERIAL GLUCOSE: 65 MG/DL — LOW (ref 70–99)
ISTAT ARTERIAL HCO3: 29 MMOL/L — HIGH (ref 22–26)
ISTAT ARTERIAL HEMATOCRIT: 28 % — LOW (ref 39–50)
ISTAT ARTERIAL HEMOGLOBIN: 9.5 G/DL — LOW (ref 13–17)
ISTAT ARTERIAL IONIZED CALCIUM: 1.15 MMOL/L — SIGNIFICANT CHANGE UP (ref 1.12–1.3)
ISTAT ARTERIAL PCO2: 39 MMHG — SIGNIFICANT CHANGE UP (ref 35–45)
ISTAT ARTERIAL PH: 7.49 — HIGH (ref 7.35–7.45)
ISTAT ARTERIAL PO2: 317 MMHG — HIGH (ref 80–105)
ISTAT ARTERIAL POTASSIUM: 3.9 MMOL/L — SIGNIFICANT CHANGE UP (ref 3.5–5.3)
ISTAT ARTERIAL SO2: 100 % — HIGH (ref 95–98)
ISTAT ARTERIAL SODIUM: 138 MMOL/L — SIGNIFICANT CHANGE UP (ref 135–145)
ISTAT ARTERIAL TCO2: 31 MMOL/L — SIGNIFICANT CHANGE UP (ref 22–31)
KETONES UR-MCNC: NEGATIVE — SIGNIFICANT CHANGE UP
LACTATE SERPL-SCNC: 0.5 MMOL/L — SIGNIFICANT CHANGE UP (ref 0.5–2)
LEUKOCYTE ESTERASE UR-ACNC: ABNORMAL
LYMPHOCYTES # BLD AUTO: 0.85 K/UL — LOW (ref 1–3.3)
LYMPHOCYTES # BLD AUTO: 1.49 K/UL — SIGNIFICANT CHANGE UP (ref 1–3.3)
LYMPHOCYTES # BLD AUTO: 15 % — SIGNIFICANT CHANGE UP (ref 13–44)
LYMPHOCYTES # BLD AUTO: 2.18 K/UL — SIGNIFICANT CHANGE UP (ref 1–3.3)
LYMPHOCYTES # BLD AUTO: 20.9 % — SIGNIFICANT CHANGE UP (ref 13–44)
LYMPHOCYTES # BLD AUTO: 8.7 % — LOW (ref 13–44)
MACROCYTES BLD QL: SLIGHT — SIGNIFICANT CHANGE UP
MAGNESIUM SERPL-MCNC: 1.8 MG/DL — SIGNIFICANT CHANGE UP (ref 1.6–2.6)
MAGNESIUM SERPL-MCNC: 2 MG/DL — SIGNIFICANT CHANGE UP (ref 1.6–2.6)
MANUAL SMEAR VERIFICATION: SIGNIFICANT CHANGE UP
MANUAL SMEAR VERIFICATION: SIGNIFICANT CHANGE UP
MCHC RBC-ENTMCNC: 29.7 PG — SIGNIFICANT CHANGE UP (ref 27–34)
MCHC RBC-ENTMCNC: 29.7 PG — SIGNIFICANT CHANGE UP (ref 27–34)
MCHC RBC-ENTMCNC: 29.8 PG — SIGNIFICANT CHANGE UP (ref 27–34)
MCHC RBC-ENTMCNC: 31.8 GM/DL — LOW (ref 32–36)
MCHC RBC-ENTMCNC: 32.8 GM/DL — SIGNIFICANT CHANGE UP (ref 32–36)
MCHC RBC-ENTMCNC: 33.1 GM/DL — SIGNIFICANT CHANGE UP (ref 32–36)
MCV RBC AUTO: 89.8 FL — SIGNIFICANT CHANGE UP (ref 80–100)
MCV RBC AUTO: 90.5 FL — SIGNIFICANT CHANGE UP (ref 80–100)
MCV RBC AUTO: 93.8 FL — SIGNIFICANT CHANGE UP (ref 80–100)
MONOCYTES # BLD AUTO: 0.76 K/UL — SIGNIFICANT CHANGE UP (ref 0–0.9)
MONOCYTES # BLD AUTO: 1.88 K/UL — HIGH (ref 0–0.9)
MONOCYTES # BLD AUTO: 1.89 K/UL — HIGH (ref 0–0.9)
MONOCYTES NFR BLD AUTO: 18.1 % — HIGH (ref 2–14)
MONOCYTES NFR BLD AUTO: 19 % — HIGH (ref 2–14)
MONOCYTES NFR BLD AUTO: 7.8 % — SIGNIFICANT CHANGE UP (ref 2–14)
NEUTROPHILS # BLD AUTO: 6.03 K/UL — SIGNIFICANT CHANGE UP (ref 1.8–7.4)
NEUTROPHILS # BLD AUTO: 6.46 K/UL — SIGNIFICANT CHANGE UP (ref 1.8–7.4)
NEUTROPHILS # BLD AUTO: 8.04 K/UL — HIGH (ref 1.8–7.4)
NEUTROPHILS NFR BLD AUTO: 57.9 % — SIGNIFICANT CHANGE UP (ref 43–77)
NEUTROPHILS NFR BLD AUTO: 65 % — SIGNIFICANT CHANGE UP (ref 43–77)
NEUTROPHILS NFR BLD AUTO: 81.7 % — HIGH (ref 43–77)
NEUTS BAND # BLD: 0.9 % — SIGNIFICANT CHANGE UP (ref 0–8)
NITRITE UR-MCNC: NEGATIVE — SIGNIFICANT CHANGE UP
NRBC # BLD: 0 /100 WBCS — SIGNIFICANT CHANGE UP (ref 0–0)
NRBC # BLD: 0 /100 WBCS — SIGNIFICANT CHANGE UP (ref 0–0)
NRBC # BLD: 0 /100 — SIGNIFICANT CHANGE UP (ref 0–0)
NRBC # BLD: SIGNIFICANT CHANGE UP /100 WBCS (ref 0–0)
NT-PROBNP SERPL-SCNC: 9165 PG/ML — HIGH (ref 0–300)
OVALOCYTES BLD QL SMEAR: SLIGHT — SIGNIFICANT CHANGE UP
PH UR: 8.5 — HIGH (ref 5–8)
PHOSPHATE SERPL-MCNC: 3.8 MG/DL — SIGNIFICANT CHANGE UP (ref 2.5–4.5)
PHOSPHATE SERPL-MCNC: 3.8 MG/DL — SIGNIFICANT CHANGE UP (ref 2.5–4.5)
PLAT MORPH BLD: NORMAL — SIGNIFICANT CHANGE UP
PLAT MORPH BLD: NORMAL — SIGNIFICANT CHANGE UP
PLATELET # BLD AUTO: 195 K/UL — SIGNIFICANT CHANGE UP (ref 150–400)
PLATELET # BLD AUTO: 221 K/UL — SIGNIFICANT CHANGE UP (ref 150–400)
PLATELET # BLD AUTO: 275 K/UL — SIGNIFICANT CHANGE UP (ref 150–400)
POIKILOCYTOSIS BLD QL AUTO: SLIGHT — SIGNIFICANT CHANGE UP
POIKILOCYTOSIS BLD QL AUTO: SLIGHT — SIGNIFICANT CHANGE UP
POLYCHROMASIA BLD QL SMEAR: SLIGHT — SIGNIFICANT CHANGE UP
POTASSIUM SERPL-MCNC: 4 MMOL/L — SIGNIFICANT CHANGE UP (ref 3.5–5.3)
POTASSIUM SERPL-MCNC: 4.2 MMOL/L — SIGNIFICANT CHANGE UP (ref 3.5–5.3)
POTASSIUM SERPL-MCNC: 4.9 MMOL/L — SIGNIFICANT CHANGE UP (ref 3.5–5.3)
POTASSIUM SERPL-SCNC: 4 MMOL/L — SIGNIFICANT CHANGE UP (ref 3.5–5.3)
POTASSIUM SERPL-SCNC: 4.2 MMOL/L — SIGNIFICANT CHANGE UP (ref 3.5–5.3)
POTASSIUM SERPL-SCNC: 4.9 MMOL/L — SIGNIFICANT CHANGE UP (ref 3.5–5.3)
PROT SERPL-MCNC: 5.1 G/DL — LOW (ref 6–8.3)
PROT SERPL-MCNC: 5.2 G/DL — LOW (ref 6–8.3)
PROT SERPL-MCNC: 6.1 G/DL — SIGNIFICANT CHANGE UP (ref 6–8.3)
PROT UR-MCNC: 100 MG/DL
PROTHROM AB SERPL-ACNC: 14.7 SEC — HIGH (ref 10.5–13.4)
PROTHROM AB SERPL-ACNC: 15.7 SEC — HIGH (ref 10.5–13.4)
RAPID RVP RESULT: SIGNIFICANT CHANGE UP
RBC # BLD: 3.05 M/UL — LOW (ref 4.2–5.8)
RBC # BLD: 3.27 M/UL — LOW (ref 4.2–5.8)
RBC # BLD: 3.43 M/UL — LOW (ref 4.2–5.8)
RBC # FLD: 14.2 % — SIGNIFICANT CHANGE UP (ref 10.3–14.5)
RBC # FLD: 14.5 % — SIGNIFICANT CHANGE UP (ref 10.3–14.5)
RBC # FLD: 15.5 % — HIGH (ref 10.3–14.5)
RBC BLD AUTO: ABNORMAL
RBC BLD AUTO: ABNORMAL
RBC CASTS # UR COMP ASSIST: < 5 /HPF — SIGNIFICANT CHANGE UP
RH IG SCN BLD-IMP: POSITIVE — SIGNIFICANT CHANGE UP
SARS-COV-2 RNA SPEC QL NAA+PROBE: SIGNIFICANT CHANGE UP
SMUDGE CELLS # BLD: PRESENT — SIGNIFICANT CHANGE UP
SODIUM SERPL-SCNC: 134 MMOL/L — LOW (ref 135–145)
SODIUM SERPL-SCNC: 135 MMOL/L — SIGNIFICANT CHANGE UP (ref 135–145)
SODIUM SERPL-SCNC: 136 MMOL/L — SIGNIFICANT CHANGE UP (ref 135–145)
SP GR SPEC: 1.01 — SIGNIFICANT CHANGE UP (ref 1–1.03)
TARGETS BLD QL SMEAR: SLIGHT — SIGNIFICANT CHANGE UP
TSH SERPL-MCNC: 2.65 UIU/ML — SIGNIFICANT CHANGE UP (ref 0.27–4.2)
UROBILINOGEN FLD QL: 0.2 E.U./DL — SIGNIFICANT CHANGE UP
WBC # BLD: 10.41 K/UL — SIGNIFICANT CHANGE UP (ref 3.8–10.5)
WBC # BLD: 12.49 K/UL — HIGH (ref 3.8–10.5)
WBC # BLD: 9.73 K/UL — SIGNIFICANT CHANGE UP (ref 3.8–10.5)
WBC # FLD AUTO: 10.41 K/UL — SIGNIFICANT CHANGE UP (ref 3.8–10.5)
WBC # FLD AUTO: 12.49 K/UL — HIGH (ref 3.8–10.5)
WBC # FLD AUTO: 9.73 K/UL — SIGNIFICANT CHANGE UP (ref 3.8–10.5)
WBC UR QL: ABNORMAL /HPF

## 2023-05-05 PROCEDURE — 75956: CPT | Mod: 26

## 2023-05-05 PROCEDURE — 70450 CT HEAD/BRAIN W/O DYE: CPT | Mod: 26,MA

## 2023-05-05 PROCEDURE — 74174 CTA ABD&PLVS W/CONTRAST: CPT | Mod: 26,MA

## 2023-05-05 PROCEDURE — ZZZZZ: CPT

## 2023-05-05 PROCEDURE — 34713 PERQ ACCESS & CLSR FEM ART: CPT | Mod: RT

## 2023-05-05 PROCEDURE — 71275 CT ANGIOGRAPHY CHEST: CPT | Mod: 26,MA

## 2023-05-05 PROCEDURE — 70490 CT SOFT TISSUE NECK W/O DYE: CPT | Mod: 26,MA

## 2023-05-05 PROCEDURE — 37252 INTRVASC US NONCORONARY 1ST: CPT

## 2023-05-05 PROCEDURE — 33880 EVASC RPR TA NDGFT COV LSA: CPT | Mod: 79

## 2023-05-05 PROCEDURE — 36200 PLACE CATHETER IN AORTA: CPT | Mod: RT,79

## 2023-05-05 DEVICE — IMPLANTABLE DEVICE: Type: IMPLANTABLE DEVICE | Status: FUNCTIONAL

## 2023-05-05 DEVICE — CATH BALLOON CODA 0.035" 10FR X 140CM: Type: IMPLANTABLE DEVICE | Status: FUNCTIONAL

## 2023-05-05 DEVICE — CATH IVUS T/A PU .035CM: Type: IMPLANTABLE DEVICE | Status: FUNCTIONAL

## 2023-05-05 DEVICE — CATH LUMBAR CLOSED TIP: Type: IMPLANTABLE DEVICE | Status: FUNCTIONAL

## 2023-05-05 DEVICE — SHEATH INTRODUCER TERUMO PINNACLE CORONARY 8FR X 10CM X 0.038" MINI WIRE: Type: IMPLANTABLE DEVICE | Status: FUNCTIONAL

## 2023-05-05 DEVICE — GUIDEWIRE LUNDERQUIST EXTRA-STIFF DOUBLE CURVED .035" X 260CM: Type: IMPLANTABLE DEVICE | Status: FUNCTIONAL

## 2023-05-05 DEVICE — CATH SOFTVU BERENSTN 5FRX100: Type: IMPLANTABLE DEVICE | Status: FUNCTIONAL

## 2023-05-05 DEVICE — SUT PERCLOSE PROGLIDE 6FR: Type: IMPLANTABLE DEVICE | Status: FUNCTIONAL

## 2023-05-05 DEVICE — SHEATH INTRODUCER TERUMO PINNACLE CORONARY 6FR X 10CM X 0.038" MINI WIRE: Type: IMPLANTABLE DEVICE | Status: FUNCTIONAL

## 2023-05-05 DEVICE — SHEATH INTRO DRYSEAL FLEX 24FR 33CM: Type: IMPLANTABLE DEVICE | Status: FUNCTIONAL

## 2023-05-05 DEVICE — SHEATH INTRODUCER TERUMO PINNACLE CORONARY 5FR X 10CM X 0.038" MINI WIRE: Type: IMPLANTABLE DEVICE | Status: FUNCTIONAL

## 2023-05-05 DEVICE — SHEATH INTRODUCER TERUMO PINNACLE PERIPHERAL 5FR X 10CM X 0.035" MINI WIRE: Type: IMPLANTABLE DEVICE | Status: FUNCTIONAL

## 2023-05-05 DEVICE — INTRO MICROPUNC 4FRX10CM SS: Type: IMPLANTABLE DEVICE | Status: FUNCTIONAL

## 2023-05-05 DEVICE — CATH AUROUS PIGTAIL 35CM 5FR: Type: IMPLANTABLE DEVICE | Status: FUNCTIONAL

## 2023-05-05 DEVICE — SHEATH INTRODUCER TERUMO PINNACLE CORONARY 11FR X 10CM X 0.038" MINI WIRE: Type: IMPLANTABLE DEVICE | Status: FUNCTIONAL

## 2023-05-05 RX ORDER — LACOSAMIDE 50 MG/1
100 TABLET ORAL EVERY 12 HOURS
Refills: 0 | Status: DISCONTINUED | OUTPATIENT
Start: 2023-05-05 | End: 2023-05-05

## 2023-05-05 RX ORDER — METOPROLOL TARTRATE 50 MG
2.5 TABLET ORAL ONCE
Refills: 0 | Status: COMPLETED | OUTPATIENT
Start: 2023-05-05 | End: 2023-05-05

## 2023-05-05 RX ORDER — DIVALPROEX SODIUM 500 MG/1
500 TABLET, DELAYED RELEASE ORAL
Refills: 0 | Status: DISCONTINUED | OUTPATIENT
Start: 2023-05-05 | End: 2023-05-05

## 2023-05-05 RX ORDER — HEPARIN SODIUM 5000 [USP'U]/ML
5000 INJECTION INTRAVENOUS; SUBCUTANEOUS EVERY 8 HOURS
Refills: 0 | Status: DISCONTINUED | OUTPATIENT
Start: 2023-05-05 | End: 2023-05-05

## 2023-05-05 RX ORDER — ACETAMINOPHEN 500 MG
650 TABLET ORAL EVERY 6 HOURS
Refills: 0 | Status: DISCONTINUED | OUTPATIENT
Start: 2023-05-05 | End: 2023-05-05

## 2023-05-05 RX ORDER — MAGNESIUM SULFATE 500 MG/ML
2 VIAL (ML) INJECTION ONCE
Refills: 0 | Status: COMPLETED | OUTPATIENT
Start: 2023-05-05 | End: 2023-05-06

## 2023-05-05 RX ORDER — DEXTROSE 50 % IN WATER 50 %
50 SYRINGE (ML) INTRAVENOUS
Refills: 0 | Status: DISCONTINUED | OUTPATIENT
Start: 2023-05-05 | End: 2023-05-06

## 2023-05-05 RX ORDER — SODIUM CHLORIDE 9 MG/ML
1000 INJECTION, SOLUTION INTRAVENOUS
Refills: 0 | Status: DISCONTINUED | OUTPATIENT
Start: 2023-05-05 | End: 2023-05-05

## 2023-05-05 RX ORDER — ALBUMIN HUMAN 25 %
250 VIAL (ML) INTRAVENOUS
Refills: 0 | Status: COMPLETED | OUTPATIENT
Start: 2023-05-05 | End: 2023-05-05

## 2023-05-05 RX ORDER — ATORVASTATIN CALCIUM 80 MG/1
20 TABLET, FILM COATED ORAL AT BEDTIME
Refills: 0 | Status: DISCONTINUED | OUTPATIENT
Start: 2023-05-05 | End: 2023-05-05

## 2023-05-05 RX ORDER — NICARDIPINE HYDROCHLORIDE 30 MG/1
5 CAPSULE, EXTENDED RELEASE ORAL
Qty: 40 | Refills: 0 | Status: DISCONTINUED | OUTPATIENT
Start: 2023-05-05 | End: 2023-05-06

## 2023-05-05 RX ORDER — PANTOPRAZOLE SODIUM 20 MG/1
40 TABLET, DELAYED RELEASE ORAL DAILY
Refills: 0 | Status: DISCONTINUED | OUTPATIENT
Start: 2023-05-05 | End: 2023-05-06

## 2023-05-05 RX ORDER — CEFAZOLIN SODIUM 1 G
2000 VIAL (EA) INJECTION EVERY 8 HOURS
Refills: 0 | Status: COMPLETED | OUTPATIENT
Start: 2023-05-05 | End: 2023-05-07

## 2023-05-05 RX ORDER — DEXTROSE 10 % IN WATER 10 %
250 INTRAVENOUS SOLUTION INTRAVENOUS ONCE
Refills: 0 | Status: COMPLETED | OUTPATIENT
Start: 2023-05-05 | End: 2023-05-05

## 2023-05-05 RX ORDER — METOPROLOL TARTRATE 50 MG
5 TABLET ORAL ONCE
Refills: 0 | Status: COMPLETED | OUTPATIENT
Start: 2023-05-05 | End: 2023-05-05

## 2023-05-05 RX ORDER — CHLORHEXIDINE GLUCONATE 213 G/1000ML
1 SOLUTION TOPICAL ONCE
Refills: 0 | Status: DISCONTINUED | OUTPATIENT
Start: 2023-05-05 | End: 2023-05-05

## 2023-05-05 RX ORDER — CHLORHEXIDINE GLUCONATE 213 G/1000ML
1 SOLUTION TOPICAL DAILY
Refills: 0 | Status: DISCONTINUED | OUTPATIENT
Start: 2023-05-05 | End: 2023-05-31

## 2023-05-05 RX ORDER — SODIUM CHLORIDE 9 MG/ML
3 INJECTION INTRAMUSCULAR; INTRAVENOUS; SUBCUTANEOUS EVERY 8 HOURS
Refills: 0 | Status: DISCONTINUED | OUTPATIENT
Start: 2023-05-05 | End: 2023-05-05

## 2023-05-05 RX ORDER — POLYETHYLENE GLYCOL 3350 17 G/17G
17 POWDER, FOR SOLUTION ORAL DAILY
Refills: 0 | Status: DISCONTINUED | OUTPATIENT
Start: 2023-05-05 | End: 2023-05-16

## 2023-05-05 RX ORDER — VALPROIC ACID (AS SODIUM SALT) 250 MG/5ML
1000 SOLUTION, ORAL ORAL EVERY 12 HOURS
Refills: 0 | Status: DISCONTINUED | OUTPATIENT
Start: 2023-05-05 | End: 2023-05-05

## 2023-05-05 RX ORDER — HYDROMORPHONE HYDROCHLORIDE 2 MG/ML
0.5 INJECTION INTRAMUSCULAR; INTRAVENOUS; SUBCUTANEOUS ONCE
Refills: 0 | Status: DISCONTINUED | OUTPATIENT
Start: 2023-05-05 | End: 2023-05-05

## 2023-05-05 RX ORDER — PROPOFOL 10 MG/ML
30 INJECTION, EMULSION INTRAVENOUS
Qty: 1000 | Refills: 0 | Status: DISCONTINUED | OUTPATIENT
Start: 2023-05-05 | End: 2023-05-06

## 2023-05-05 RX ORDER — VALPROIC ACID (AS SODIUM SALT) 250 MG/5ML
1000 SOLUTION, ORAL ORAL EVERY 12 HOURS
Refills: 0 | Status: DISCONTINUED | OUTPATIENT
Start: 2023-05-05 | End: 2023-05-10

## 2023-05-05 RX ORDER — CHLORHEXIDINE GLUCONATE 213 G/1000ML
15 SOLUTION TOPICAL EVERY 12 HOURS
Refills: 0 | Status: DISCONTINUED | OUTPATIENT
Start: 2023-05-05 | End: 2023-05-06

## 2023-05-05 RX ORDER — MORPHINE SULFATE 50 MG/1
4 CAPSULE, EXTENDED RELEASE ORAL ONCE
Refills: 0 | Status: DISCONTINUED | OUTPATIENT
Start: 2023-05-05 | End: 2023-05-05

## 2023-05-05 RX ORDER — DEXTROSE 50 % IN WATER 50 %
25 SYRINGE (ML) INTRAVENOUS
Refills: 0 | Status: DISCONTINUED | OUTPATIENT
Start: 2023-05-05 | End: 2023-05-06

## 2023-05-05 RX ORDER — SENNA PLUS 8.6 MG/1
2 TABLET ORAL AT BEDTIME
Refills: 0 | Status: DISCONTINUED | OUTPATIENT
Start: 2023-05-05 | End: 2023-05-16

## 2023-05-05 RX ORDER — PHENYLEPHRINE HYDROCHLORIDE 10 MG/ML
0.01 INJECTION INTRAVENOUS
Qty: 40 | Refills: 0 | Status: DISCONTINUED | OUTPATIENT
Start: 2023-05-05 | End: 2023-05-06

## 2023-05-05 RX ORDER — ASPIRIN/CALCIUM CARB/MAGNESIUM 324 MG
81 TABLET ORAL DAILY
Refills: 0 | Status: DISCONTINUED | OUTPATIENT
Start: 2023-05-05 | End: 2023-05-05

## 2023-05-05 RX ORDER — SODIUM CHLORIDE 9 MG/ML
1000 INJECTION INTRAMUSCULAR; INTRAVENOUS; SUBCUTANEOUS
Refills: 0 | Status: DISCONTINUED | OUTPATIENT
Start: 2023-05-05 | End: 2023-05-26

## 2023-05-05 RX ORDER — INSULIN HUMAN 100 [IU]/ML
1 INJECTION, SOLUTION SUBCUTANEOUS
Qty: 100 | Refills: 0 | Status: DISCONTINUED | OUTPATIENT
Start: 2023-05-05 | End: 2023-05-06

## 2023-05-05 RX ORDER — LACOSAMIDE 50 MG/1
100 TABLET ORAL EVERY 12 HOURS
Refills: 0 | Status: DISCONTINUED | OUTPATIENT
Start: 2023-05-05 | End: 2023-05-10

## 2023-05-05 RX ORDER — DEXMEDETOMIDINE HYDROCHLORIDE IN 0.9% SODIUM CHLORIDE 4 UG/ML
0.5 INJECTION INTRAVENOUS
Qty: 400 | Refills: 0 | Status: DISCONTINUED | OUTPATIENT
Start: 2023-05-05 | End: 2023-05-07

## 2023-05-05 RX ORDER — METOPROLOL TARTRATE 50 MG
100 TABLET ORAL DAILY
Refills: 0 | Status: DISCONTINUED | OUTPATIENT
Start: 2023-05-05 | End: 2023-05-05

## 2023-05-05 RX ORDER — CALCIUM GLUCONATE 100 MG/ML
2 VIAL (ML) INTRAVENOUS ONCE
Refills: 0 | Status: COMPLETED | OUTPATIENT
Start: 2023-05-05 | End: 2023-05-05

## 2023-05-05 RX ORDER — CHLORHEXIDINE GLUCONATE 213 G/1000ML
15 SOLUTION TOPICAL ONCE
Refills: 0 | Status: DISCONTINUED | OUTPATIENT
Start: 2023-05-05 | End: 2023-05-05

## 2023-05-05 RX ORDER — PANTOPRAZOLE SODIUM 20 MG/1
40 TABLET, DELAYED RELEASE ORAL
Refills: 0 | Status: DISCONTINUED | OUTPATIENT
Start: 2023-05-05 | End: 2023-05-05

## 2023-05-05 RX ADMIN — HYDROMORPHONE HYDROCHLORIDE 0.5 MILLIGRAM(S): 2 INJECTION INTRAMUSCULAR; INTRAVENOUS; SUBCUTANEOUS at 10:30

## 2023-05-05 RX ADMIN — LACOSAMIDE 120 MILLIGRAM(S): 50 TABLET ORAL at 18:37

## 2023-05-05 RX ADMIN — DEXMEDETOMIDINE HYDROCHLORIDE IN 0.9% SODIUM CHLORIDE 8.75 MICROGRAM(S)/KG/HR: 4 INJECTION INTRAVENOUS at 23:41

## 2023-05-05 RX ADMIN — Medication 125 MILLILITER(S): at 23:26

## 2023-05-05 RX ADMIN — HYDROMORPHONE HYDROCHLORIDE 0.5 MILLIGRAM(S): 2 INJECTION INTRAMUSCULAR; INTRAVENOUS; SUBCUTANEOUS at 10:13

## 2023-05-05 RX ADMIN — Medication 125 MILLILITER(S): at 22:00

## 2023-05-05 RX ADMIN — Medication 5 MILLIGRAM(S): at 19:15

## 2023-05-05 RX ADMIN — Medication 2.5 MILLIGRAM(S): at 16:45

## 2023-05-05 RX ADMIN — CHLORHEXIDINE GLUCONATE 15 MILLILITER(S): 213 SOLUTION TOPICAL at 18:45

## 2023-05-05 RX ADMIN — Medication 1000 MILLILITER(S): at 18:51

## 2023-05-05 RX ADMIN — MORPHINE SULFATE 4 MILLIGRAM(S): 50 CAPSULE, EXTENDED RELEASE ORAL at 05:07

## 2023-05-05 RX ADMIN — NICARDIPINE HYDROCHLORIDE 25 MG/HR: 30 CAPSULE, EXTENDED RELEASE ORAL at 16:12

## 2023-05-05 RX ADMIN — Medication 100 MILLIGRAM(S): at 21:20

## 2023-05-05 RX ADMIN — Medication 60 MILLIGRAM(S): at 19:51

## 2023-05-05 RX ADMIN — Medication 200 GRAM(S): at 16:12

## 2023-05-05 NOTE — H&P ADULT - HISTORY OF PRESENT ILLNESS
53 YO Male, current every day marijuana use, w/ PMHx of HTN, type A aortic dissection s/p Dacron grafts, AV resuspension in 2013, CAD s/p CABG x 1 SVG to RCA (5/2013 with Dr. Medina), seizure disorder (last episode on 7/4/22) who was admitted for surgical mgmt of progression of aneurysmal disease. Recent admission 3/20-4/14 during which patient underwent replacement of transverse aortic arch, second stage thoracic endovascular aortic repair, aorto-axillary bypass, AV replacement (bio 23mm), and CABG x 1 (SVG-RCA) EF 75% (Ignacio, 3/20). Post op cb lactic acidosis, severe cardiogenic and vasogenic shock, TREY requiring CVVHD and temporary dialysis requirement. Patient presented to [facility] ED 4/27 for syncope vs seizure episode at home, falling onto back of head. Nuerological workup proformed during that visit revealed a negative EEG, but given clinical picture of seizures, pt started on vimpat and depakote. Imaging preformed during that admission did no require acute surgical intervention on endoleak.   Pt presented to [facility] ED last night complaning of worsening epigastric pain. CTAP revealed continued endoleak. Sent to [facility] for further evaluation. Pt will be taken to the OR with Dr. Pierre this morning for a completion TEVAR. Pt consented, OR aware.

## 2023-05-05 NOTE — H&P ADULT - ASSESSMENT
51 YO Male, current every day marijuana use, w/ PMHx of HTN, type A aortic dissection s/p Dacron grafts, AV resuspension in 2013, CAD s/p CABG x 1 SVG to RCA (5/2013 with Dr. Medina), seizure disorder (last episode on 7/4/22) who was admitted for surgical mgmt of progression of aneurysmal disease. Recent admission 3/20-4/14 during which patient underwent replacement of transverse aortic arch, second stage thoracic endovascular aortic repair, aorto-axillary bypass, AV replacement (bio 23mm), and CABG x 1 (SVG-RCA) EF 75% (Ignacio, 3/20). Post op cb lactic acidosis, severe cardiogenic and vasogenic shock, TREY requiring CVVHD and temporary dialysis requirement. Patient presented to [facility] ED 4/27 for syncope vs seizure episode at home, falling onto back of head. Nuerological workup proformed during that visit revealed a negative EEG, but given clinical picture of seizures, pt started on vimpat and depakote. Imaging preformed during that admission did no require acute surgical intervention on endoleak.   Pt presented to [facility] ED last night complaning of worsening epigastric pain. CTAP revealed continued endoleak. Sent to [facility] for further evaluation. Pt will be taken to the OR with Dr. Pierre this morning for a completion TEVAR. Pt consented, OR aware.     Plan  -OR today for TEVAR completion

## 2023-05-05 NOTE — PROGRESS NOTE ADULT - SUBJECTIVE AND OBJECTIVE BOX
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 Recent admission 3/20-4/14 during which patient underwent replacement of transverse aortic arch, second stage thoracic endovascular aortic repair, aorto-axillary bypass, AV replacement (bio 23mm), and CABG x 1 (SVG-RCA) EF 75% (Ignacio, 3/20). Post op cb lactic acidosis, severe cardiogenic and vasogenic shock, TREY requiring CVVHD and temporary dialysis requirement. Recent admission 4/27-4/30 for seizure episode, were his AEDS were adjusted. Presented to [facility] ED 5/4 for epigastric pain. CT exam which showed known endoleak for which pt was tx to [facility]. Patient underwent TEVAR with Dr. Pierre 5/5, LD placed prior by NS. Arrived intubated on propofol.       FAMILY HISTORY:  No pertinent family history in first degree relatives  PAST MEDICAL & SURGICAL HISTORY:  HTN (hypertension)  Aortic dissection  CAD (coronary artery disease)  Seizure disorder  S/P aortic bifurcation bypass graft  S/P CABG x 1        Patient is a 53y old  Male who presents with a chief complaint of endoleak, abdominal pain.      14 system review was unremarkable    Vital signs, hemodynamic and respiratory parameters were reviewed from the bedside nursing flowsheet.  ICU Vital Signs Last 24 Hrs  T(C): 37.3 (05 May 2023 10:25), Max: 37.4 (05 May 2023 00:00)  T(F): 99.1 (05 May 2023 10:01), Max: 99.3 (05 May 2023 00:00)  HR: 68 (05 May 2023 15:33) (62 - 114)  BP: 121/82 (05 May 2023 10:25) (116/79 - 142/95)  BP(mean): 96 (05 May 2023 10:25) (96 - 99)  ABP: 152/77 (05 May 2023 15:30) (145/69 - 152/77)  ABP(mean): 106 (05 May 2023 15:30) (98 - 106)  RR: 12 (05 May 2023 15:30) (12 - 21)  SpO2: 100% (05 May 2023 15:33) (94% - 100%)    O2 Parameters below as of 05 May 2023 15:30  Patient On (Oxygen Delivery Method): ventilator          Adult Advanced Hemodynamics Last 24 Hrs  CVP(mm Hg): --  CVP(cm H2O): --  CO: --  CI: --  PA: --  PA(mean): --  PCWP: --  SVR: --  SVRI: --  PVR: --  PVRI: --, ABG - ( 05 May 2023 15:27 )  pH, Arterial: 7.48  pH, Blood: x     /  pCO2: 35    /  pO2: 291   / HCO3: x     / Base Excess: 2.8   /  SaO2: N/A               Mode: AC/ CMV (Assist Control/ Continuous Mandatory Ventilation)  RR (machine): 10  TV (machine): 500  FiO2: 50  PEEP: 5  ITime: 1  MAP: 8  PIP: 19    Intake and output was reviewed and the fluid balance was calculated  Daily Height in cm: 182.88 (05 May 2023 10:25)    Daily   I&O's Summary      All lines and drain sites were assessed  Glycemic trend was reviewed    Neuro: sedated  HEENT: ett  Heart: s1 s2  Lungs: clear bl  Abdomen: soft nt nd  Extremities: wwp    Lines:  r radial arterial line 5/5    Tubes:  LD      labs  CBC Full  -  ( 05 May 2023 15:30 )  WBC Count : 10.41 K/uL  RBC Count : 3.27 M/uL  Hemoglobin : 9.7 g/dL  Hematocrit : 29.6 %  Platelet Count - Automated : 195 K/uL  Mean Cell Volume : 90.5 fl  Mean Cell Hemoglobin : 29.7 pg  Mean Cell Hemoglobin Concentration : 32.8 gm/dL  Auto Neutrophil # : 6.03 K/uL  Auto Lymphocyte # : 2.18 K/uL  Auto Monocyte # : 1.88 K/uL  Auto Eosinophil # : 0.20 K/uL  Auto Basophil # : 0.03 K/uL  Auto Neutrophil % : 57.9 %  Auto Lymphocyte % : 20.9 %  Auto Monocyte % : 18.1 %  Auto Eosinophil % : 1.9 %  Auto Basophil % : 0.3 %    05-05    136  |  102  |  x   ----------------------------<  x   4.2   |  x   |  x     Ca    8.4      05 May 2023 07:25  Phos  3.8     05-05  Mg     2.0     05-05    TPro  6.1  /  Alb  2.4<L>  /  TBili  0.3  /  DBili  x   /  AST  16  /  ALT  5<L>  /  AlkPhos  71  05-05    PT/INR - ( 05 May 2023 07:25 )   PT: 14.7 sec;   INR: 1.23          PTT - ( 05 May 2023 07:25 )  PTT:33.8 sec  The current medications were reviewed   MEDICATIONS  (STANDING):  calcium gluconate IVPB 2 Gram(s) IV Intermittent once  ceFAZolin   IVPB 2000 milliGRAM(s) IV Intermittent every 8 hours  chlorhexidine 0.12% Liquid 15 milliLiter(s) Oral Mucosa every 12 hours  chlorhexidine 2% Cloths 1 Application(s) Topical daily  dextrose 50% Injectable 50 milliLiter(s) IV Push every 15 minutes  dextrose 50% Injectable 25 milliLiter(s) IV Push every 15 minutes  divalproex  milliGRAM(s) Oral <User Schedule>  insulin regular Infusion 1 Unit(s)/Hr (1 mL/Hr) IV Continuous <Continuous>  lacosamide 100 milliGRAM(s) Oral every 12 hours  niCARdipine Infusion 5 mG/Hr (25 mL/Hr) IV Continuous <Continuous>  pantoprazole  Injectable 40 milliGRAM(s) IV Push daily  phenylephrine    Infusion 0.01 MICROgram(s)/kG/Min (0.26 mL/Hr) IV Continuous <Continuous>  polyethylene glycol 3350 17 Gram(s) Oral daily  propofol Infusion 30 MICROgram(s)/kG/Min (12.6 mL/Hr) IV Continuous <Continuous>  senna 2 Tablet(s) Oral at bedtime  sodium chloride 0.9%. 1000 milliLiter(s) (10 mL/Hr) IV Continuous <Continuous>    MEDICATIONS  (PRN):      Assessment/Plan:  53yr old male with PMH HTN, type A dissection sp Dacron grafts and AV resuspension (2013), CAD sp CABG x 1 (Adam, 5/2013, SVG-RCA), seizures, admitted for surgical mgmt of progression of aneurysmal disease. Recent admission 3/20-4/14 during which patient underwent replacement of transverse aortic arch, second stage thoracic endovascular aortic repair, aorto-axillary bypass, AV replacement (bio 23mm), and CABG x 1 (SVG-RCA) EF 75% (Ignacio, 3/20). Post op cb lactic acidosis, severe cardiogenic and vasogenic shock, TREY requiring CVVHD and temporary dialysis requirement. Recent admission 4/27-4/30 for seizure episode, were his AEDS were adjusted. Presented to [facility] ED 5/4 for epigastric pain. CT exam which showed known endoleak for which pt was tx to [facility]. Patient underwent TEVAR with Dr. Pierre 5/5, LD placed prior by NS. Arrived intubated on propofol. Given 2 units intraop, 1L IVF, 100cc urine output.    SP TEVAR (Ignacio, 5/5)  MAP goal   Cardene prn  Neuro checks  LD continue draining 10cc/hr  Post op CXR  Post op LAds  AEDS  Periop abx  Replete lytes prn  GI/DVT PPX  Bowel Regimen  Pain control  Close hemodynamic, ventilatory and drain monitoring and management per post op routine  Monitor for arrhythmias and monitor parameters for organ perfusion    Monitor neurologic status  Head of the bed should remain elevated to 45 deg   Chest PT and IS will be encouraged  Monitor adequacy of oxygenation and ventilation and attempt to wean oxygen  Antibiotic regimen will be tailored to the clinical, laboratory and microbiologic data  Nutritional goals will be met using po eventually, ensure adequate caloric intake and monitor the same  Stress ulcer and VTE prophylaxis will be achieved    Glycemic control is satisfactory  Electrolytes have been repleted as necessary and wound care has been carried out   Pain control has been achieved.   Aggressive physical therapy and early mobility and ambulation goals will be met   The family was updated about the course and plan.    CRITICAL CARE TIME personally provided by me  in evaluation and management, reassessments, review and interpretation of labs and x-rays, ventilator and hemodynamic management, formulating a plan and coordinating care: ___105____ MIN.  Time does not include procedural time.    CTICU ATTENDING     					  Alexx Brooks MD

## 2023-05-05 NOTE — PROGRESS NOTE ADULT - SUBJECTIVE AND OBJECTIVE BOX
CTICU  CRITICAL  CARE  attending     Hand off received 					   Pertinent clinical, laboratory, radiographic, hemodynamic, echocardiographic, respiratory data, microbiologic data and chart were reviewed and analyzed frequently throughout the course of the day and night    52 year old Male with HTN, type A aortic dissection s/p Dacron grafts, AV resuspension in 2013, CAD s/p CABG x 1 SVG to RCA (5/2013 with Dr. Medina), seizure disorder (last episode on 7/4/22)   He is a current every day marijuana user.   He was admitted for surgical management of progression of aneurysmal disease.   On 3/20/23 the patient underwent replacement of transverse aortic arch, second stage thoracic endovascular aortic repair, aorto-axillary bypass, AV replacement (bio 23mm),CABG x 1 (SVG-RCA).   Postoperative course complicated by lactic acidosis, severe cardiogenic and vasogenic shock, TREY requiring CVVHD and temporary dialysis requirement. Eventually discharged 4/14/23.   The patient presented to [facility] Emergency Room 4/27 for syncope vs seizure episode at home, falling backwards on the head.   Neurological workup performed during that visit revealed a negative EEG, but given clinical picture of seizures, he was started on vimpat and depakote.   Imaging preformed during that admission did no necessitate acute surgical intervention on endoleak.   He was readmitted to [facility] complaining of worsening epigastric pain.   CTAP revealed continued endoleak.   He was transferred to [facility] for further evaluation.    S/P Second stage TEVAR.       FAMILY HISTORY:  No pertinent family history in first degree relatives    PAST MEDICAL & SURGICAL HISTORY:  HTN (hypertension).  Aortic dissection  CAD (coronary artery disease)  Seizure disorder  S/P aortic bifurcation bypass graft  S/P CABG x 1          14 system review was unremarkable    Vital signs, hemodynamic and respiratory parameters were reviewed from the bedside nursing flow sheet.  ICU Vital Signs Last 24 Hrs  T(C): 37.3 (06 May 2023 01:11), Max: 37.4 (05 May 2023 20:57)  T(F): 99.2 (06 May 2023 01:11), Max: 99.3 (05 May 2023 20:57)  HR: 100 (06 May 2023 02:00) (62 - 134)  BP: 121/82 (05 May 2023 10:25) (116/79 - 129/89)  BP(mean): 96 (05 May 2023 10:25) (96 - 99)  ABP: 145/69 (06 May 2023 02:00) (135/69 - 161/84)  ABP(mean): 93 (06 May 2023 02:00) (93 - 116)  RR: 20 (05 May 2023 19:30) (12 - 20)  SpO2: 100% (06 May 2023 02:00) (94% - 100%)    O2 Parameters below as of 06 May 2023 02:00  Patient On (Oxygen Delivery Method): ventilator    O2 Concentration (%): 50      Adult Advanced Hemodynamics Last 24 Hrs  CVP(mm Hg): --  CVP(cm H2O): --  CO: --  CI: --  PA: --  PA(mean): --  PCWP: --  SVR: --  SVRI: --  PVR: --  PVRI: --, ABG - ( 05 May 2023 23:16 )  pH, Arterial: 7.44  pH, Blood: x     /  pCO2: 39    /  pO2: 177   / HCO3: 26    / Base Excess: 2.3   /  SaO2: 99.6              Mode: AC/ CMV (Assist Control/ Continuous Mandatory Ventilation)  RR (machine): 10  TV (machine): 500  FiO2: 50  PEEP: 5  ITime: 1  MAP: 7  PIP: 18    Intake and output was reviewed and the fluid balance was calculated  Daily Height in cm: 182.88 (05 May 2023 10:25)    Daily   I&O's Summary    05 May 2023 07:01  -  06 May 2023 02:41  --------------------------------------------------------  IN: 1425.2 mL / OUT: 750 mL / NET: 675.2 mL        All lines and drain sites were assessed    Neuro: Immediate postop he was moving his toes and coud not flex his knees or raise off gravity.   Now improvement seen in the lower extremity strength.  Neck: No JVD.  CVS: S1, S2, No S3.  Lungs: Good air entry bilaterally.  Abd: Soft. No tenderness. + Bowel sounds.  Vascular: + DP/PT.  Extremities: No edema.  Lymphatic: Normal.  Skin: No abnormalities.      labs  CBC Full  -  ( 05 May 2023 23:15 )  WBC Count : 12.49 K/uL  RBC Count : 3.43 M/uL  Hemoglobin : 10.2 g/dL  Hematocrit : 30.8 %  Platelet Count - Automated : 221 K/uL  Mean Cell Volume : 89.8 fl  Mean Cell Hemoglobin : 29.7 pg  Mean Cell Hemoglobin Concentration : 33.1 gm/dL  Auto Neutrophil # : x  Auto Lymphocyte # : x  Auto Monocyte # : x  Auto Eosinophil # : x  Auto Basophil # : x  Auto Neutrophil % : x  Auto Lymphocyte % : x  Auto Monocyte % : x  Auto Eosinophil % : x  Auto Basophil % : x    05-05    134<L>  |  103  |  22  ----------------------------<  91  4.0   |  24  |  1.36<H>    Ca    7.9<L>      05 May 2023 23:15  Phos  3.8     05-05  Mg     1.8     05-05    TPro  5.2<L>  /  Alb  2.2<L>  /  TBili  0.3  /  DBili  x   /  AST  13  /  ALT  <5<L>  /  AlkPhos  59  05-05    PT/INR - ( 05 May 2023 15:30 )   PT: 15.7 sec;   INR: 1.32          PTT - ( 05 May 2023 15:30 )  PTT:34.0 sec  The current medications were reviewed   MEDICATIONS  (STANDING):  ceFAZolin   IVPB 2000 milliGRAM(s) IV Intermittent every 8 hours  chlorhexidine 0.12% Liquid 15 milliLiter(s) Oral Mucosa every 12 hours  chlorhexidine 2% Cloths 1 Application(s) Topical daily  dexMEDEtomidine Infusion 0.5 MICROgram(s)/kG/Hr (8.75 mL/Hr) IV Continuous <Continuous>  dextrose 50% Injectable 50 milliLiter(s) IV Push every 15 minutes  dextrose 50% Injectable 25 milliLiter(s) IV Push every 15 minutes  insulin regular Infusion 1 Unit(s)/Hr (1 mL/Hr) IV Continuous <Continuous>  lacosamide IVPB 100 milliGRAM(s) IV Intermittent every 12 hours  niCARdipine Infusion 5 mG/Hr (25 mL/Hr) IV Continuous <Continuous>  pantoprazole  Injectable 40 milliGRAM(s) IV Push daily  phenylephrine    Infusion 0.01 MICROgram(s)/kG/Min (0.26 mL/Hr) IV Continuous <Continuous>  polyethylene glycol 3350 17 Gram(s) Oral daily  propofol Infusion 30 MICROgram(s)/kG/Min (12.6 mL/Hr) IV Continuous <Continuous>  senna 2 Tablet(s) Oral at bedtime  sodium chloride 0.9%. 1000 milliLiter(s) (10 mL/Hr) IV Continuous <Continuous>  valproate sodium  IVPB 1000 milliGRAM(s) IV Intermittent every 12 hours    MEDICATIONS  (PRN):        53 year old  Male admitted with abdominal pain due to endoleak.  S/P Second Stage TEVAR.  Hemodynamically stable.  Good oxygenation.  Fair urine out put.        My plan includes :  Keep MAP above 100.  WEAN to Extubate.  Statin Rx.  Close hemodynamic, ventilatory and drain monitoring and management  Monitor for arrhythmias and monitor parameters for organ perfusion  Monitor neurologic status  Monitor renal function.  Head of the bed should remain elevated to 45 deg .   Chest PT and IS will be encouraged  Monitor adequacy of oxygenation and ventilation and attempt to wean oxygen  Nutritional goals will be met using po eventually , ensure adequate caloric intake and monitor the same  Stress ulcer and VTE prophylaxis will be achieved    Glycemic control is satisfactory  Electrolytes have been repleted as necessary and wound care has been carried out. Pain control has been achieved.   Aggressive physical therapy and early mobility and ambulation goals will be met   The family was updated about the course and plan  CRITICAL CARE TIME SPENT in evaluation and management, reassessments, review and interpretation of labs and x-rays, ventilator and hemodynamic management, formulating a plan and coordinating care: ___30____ MIN.  Time does not include procedural time.  CTICU ATTENDING     					    José Miguel Torres MD

## 2023-05-05 NOTE — H&P ADULT - NSHPPHYSICALEXAM_GEN_ALL_CORE
PHYSICAL EXAM:  General: resting in bed, appears somewhat uncomfortable  Neurological: AOx3. Motor skills grossly intact  Cardiovascular: Normal S1/S2. Regular rate/rhythm. No murmurs  Respiratory: Lungs CTA bilaterally. No wheezing or rales  Gastrointestinal: epigastric abdominal tenderness  Extremities: Strength 5/5 b/l upper/lower extremities. Sensation grossly intact upper/lower extremities. No edema. No calf tenderness.  Vascular: Radial 2+bilaterally, DP 2+ b/l  Incision Sites: healed

## 2023-05-05 NOTE — H&P ADULT - NSHPREVIEWOFSYSTEMS_GEN_ALL_CORE
Review of Systems  CONSTITUTIONAL:  Denies Fevers / chills, sweats, fatigue, weight loss, weight gain                                      NEURO:  Denies changes in sensation, seizures, syncope, confusion                                                                            EYES:  Denies Blurry vision, discharge, pain, loss of vision                                                                                    ENMT:  Denies Difficulty hearing, vertigo, dysphagia, epistaxis, recent dental work                                       CV:  Denies Chest pain, palpitations, TAYLOR, orthopnea                                                                                          RESPIRATORY:  Denies Wheezing, SOB, cough / sputum, hemoptysis                                                                GI:  reports diffuse abd pain, worse is epigastrum. Denies vomiting. Reports some diarrhea.                                          : Denies Hematuria, dysuria, urgency, incontinence                                                                                         MUSKULOSKELETAL:  Denies arthritis, joint swelling, muscle weakness                                                             SKIN/BREAST:  Denies rash, itching, hair loss, masses                                                                                            PSYCH:  Denies depression, anxiety, suicidal ideation                                                                                               HEME/LYMPH:  Denies bruises easily, enlarged lymph nodes, tender lymph nodes                                        ENDOCRINE:  Denies cold intolerance, heat intolerance, polydipsia

## 2023-05-05 NOTE — PATIENT PROFILE ADULT - FALL HARM RISK - UNIVERSAL INTERVENTIONS
Bed in lowest position, wheels locked, appropriate side rails in place/Call bell, personal items and telephone in reach/Instruct patient to call for assistance before getting out of bed or chair/Non-slip footwear when patient is out of bed/[place] to call system/Physically safe environment - no spills, clutter or unnecessary equipment/Purposeful Proactive Rounding/Room/bathroom lighting operational, light cord in reach

## 2023-05-06 LAB
ALBUMIN SERPL ELPH-MCNC: 2.3 G/DL — LOW (ref 3.3–5)
ALBUMIN SERPL ELPH-MCNC: 2.7 G/DL — LOW (ref 3.3–5)
ALP SERPL-CCNC: 52 U/L — SIGNIFICANT CHANGE UP (ref 40–120)
ALP SERPL-CCNC: 59 U/L — SIGNIFICANT CHANGE UP (ref 40–120)
ALT FLD-CCNC: <5 U/L — LOW (ref 10–45)
ALT FLD-CCNC: <5 U/L — LOW (ref 10–45)
ANION GAP SERPL CALC-SCNC: 7 MMOL/L — SIGNIFICANT CHANGE UP (ref 5–17)
ANION GAP SERPL CALC-SCNC: 9 MMOL/L — SIGNIFICANT CHANGE UP (ref 5–17)
APTT BLD: 34.7 SEC — SIGNIFICANT CHANGE UP (ref 27.5–35.5)
APTT BLD: 36.5 SEC — HIGH (ref 27.5–35.5)
AST SERPL-CCNC: 10 U/L — SIGNIFICANT CHANGE UP (ref 10–40)
AST SERPL-CCNC: 11 U/L — SIGNIFICANT CHANGE UP (ref 10–40)
BILIRUB SERPL-MCNC: 0.4 MG/DL — SIGNIFICANT CHANGE UP (ref 0.2–1.2)
BILIRUB SERPL-MCNC: 0.6 MG/DL — SIGNIFICANT CHANGE UP (ref 0.2–1.2)
BUN SERPL-MCNC: 19 MG/DL — SIGNIFICANT CHANGE UP (ref 7–23)
BUN SERPL-MCNC: 21 MG/DL — SIGNIFICANT CHANGE UP (ref 7–23)
CALCIUM SERPL-MCNC: 8 MG/DL — LOW (ref 8.4–10.5)
CALCIUM SERPL-MCNC: 8.3 MG/DL — LOW (ref 8.4–10.5)
CHLORIDE SERPL-SCNC: 101 MMOL/L — SIGNIFICANT CHANGE UP (ref 96–108)
CHLORIDE SERPL-SCNC: 103 MMOL/L — SIGNIFICANT CHANGE UP (ref 96–108)
CO2 SERPL-SCNC: 24 MMOL/L — SIGNIFICANT CHANGE UP (ref 22–31)
CO2 SERPL-SCNC: 25 MMOL/L — SIGNIFICANT CHANGE UP (ref 22–31)
CREAT SERPL-MCNC: 1.34 MG/DL — HIGH (ref 0.5–1.3)
CREAT SERPL-MCNC: 1.45 MG/DL — HIGH (ref 0.5–1.3)
CULTURE RESULTS: SIGNIFICANT CHANGE UP
EGFR: 57 ML/MIN/1.73M2 — LOW
EGFR: 63 ML/MIN/1.73M2 — SIGNIFICANT CHANGE UP
GAS PNL BLDA: SIGNIFICANT CHANGE UP
GLUCOSE BLDC GLUCOMTR-MCNC: 100 MG/DL — HIGH (ref 70–99)
GLUCOSE BLDC GLUCOMTR-MCNC: 102 MG/DL — HIGH (ref 70–99)
GLUCOSE BLDC GLUCOMTR-MCNC: 82 MG/DL — SIGNIFICANT CHANGE UP (ref 70–99)
GLUCOSE SERPL-MCNC: 93 MG/DL — SIGNIFICANT CHANGE UP (ref 70–99)
GLUCOSE SERPL-MCNC: 96 MG/DL — SIGNIFICANT CHANGE UP (ref 70–99)
HCT VFR BLD CALC: 26 % — LOW (ref 39–50)
HCT VFR BLD CALC: 27.9 % — LOW (ref 39–50)
HGB BLD-MCNC: 8.8 G/DL — LOW (ref 13–17)
HGB BLD-MCNC: 9.3 G/DL — LOW (ref 13–17)
INR BLD: 1.38 — HIGH (ref 0.88–1.16)
INR BLD: 1.46 — HIGH (ref 0.88–1.16)
MAGNESIUM SERPL-MCNC: 2.3 MG/DL — SIGNIFICANT CHANGE UP (ref 1.6–2.6)
MAGNESIUM SERPL-MCNC: 3.2 MG/DL — HIGH (ref 1.6–2.6)
MCHC RBC-ENTMCNC: 29.9 PG — SIGNIFICANT CHANGE UP (ref 27–34)
MCHC RBC-ENTMCNC: 29.9 PG — SIGNIFICANT CHANGE UP (ref 27–34)
MCHC RBC-ENTMCNC: 33.3 GM/DL — SIGNIFICANT CHANGE UP (ref 32–36)
MCHC RBC-ENTMCNC: 33.8 GM/DL — SIGNIFICANT CHANGE UP (ref 32–36)
MCV RBC AUTO: 88.4 FL — SIGNIFICANT CHANGE UP (ref 80–100)
MCV RBC AUTO: 89.7 FL — SIGNIFICANT CHANGE UP (ref 80–100)
NRBC # BLD: 0 /100 WBCS — SIGNIFICANT CHANGE UP (ref 0–0)
NRBC # BLD: 0 /100 WBCS — SIGNIFICANT CHANGE UP (ref 0–0)
PHOSPHATE SERPL-MCNC: 4.1 MG/DL — SIGNIFICANT CHANGE UP (ref 2.5–4.5)
PHOSPHATE SERPL-MCNC: 4.5 MG/DL — SIGNIFICANT CHANGE UP (ref 2.5–4.5)
PLATELET # BLD AUTO: 172 K/UL — SIGNIFICANT CHANGE UP (ref 150–400)
PLATELET # BLD AUTO: 200 K/UL — SIGNIFICANT CHANGE UP (ref 150–400)
POTASSIUM SERPL-MCNC: 4.2 MMOL/L — SIGNIFICANT CHANGE UP (ref 3.5–5.3)
POTASSIUM SERPL-MCNC: 4.4 MMOL/L — SIGNIFICANT CHANGE UP (ref 3.5–5.3)
POTASSIUM SERPL-SCNC: 4.2 MMOL/L — SIGNIFICANT CHANGE UP (ref 3.5–5.3)
POTASSIUM SERPL-SCNC: 4.4 MMOL/L — SIGNIFICANT CHANGE UP (ref 3.5–5.3)
PROT SERPL-MCNC: 5.3 G/DL — LOW (ref 6–8.3)
PROT SERPL-MCNC: 5.3 G/DL — LOW (ref 6–8.3)
PROTHROM AB SERPL-ACNC: 16.5 SEC — HIGH (ref 10.5–13.4)
PROTHROM AB SERPL-ACNC: 17.5 SEC — HIGH (ref 10.5–13.4)
RBC # BLD: 2.94 M/UL — LOW (ref 4.2–5.8)
RBC # BLD: 3.11 M/UL — LOW (ref 4.2–5.8)
RBC # FLD: 15.8 % — HIGH (ref 10.3–14.5)
RBC # FLD: 15.8 % — HIGH (ref 10.3–14.5)
SODIUM SERPL-SCNC: 133 MMOL/L — LOW (ref 135–145)
SODIUM SERPL-SCNC: 136 MMOL/L — SIGNIFICANT CHANGE UP (ref 135–145)
SPECIMEN SOURCE: SIGNIFICANT CHANGE UP
WBC # BLD: 11.84 K/UL — HIGH (ref 3.8–10.5)
WBC # BLD: 11.87 K/UL — HIGH (ref 3.8–10.5)
WBC # FLD AUTO: 11.84 K/UL — HIGH (ref 3.8–10.5)
WBC # FLD AUTO: 11.87 K/UL — HIGH (ref 3.8–10.5)

## 2023-05-06 RX ORDER — ACETAMINOPHEN 500 MG
1000 TABLET ORAL ONCE
Refills: 0 | Status: COMPLETED | OUTPATIENT
Start: 2023-05-06 | End: 2023-05-06

## 2023-05-06 RX ORDER — GLUCAGON INJECTION, SOLUTION 0.5 MG/.1ML
1 INJECTION, SOLUTION SUBCUTANEOUS ONCE
Refills: 0 | Status: DISCONTINUED | OUTPATIENT
Start: 2023-05-06 | End: 2023-05-10

## 2023-05-06 RX ORDER — SODIUM CHLORIDE 9 MG/ML
1000 INJECTION, SOLUTION INTRAVENOUS
Refills: 0 | Status: DISCONTINUED | OUTPATIENT
Start: 2023-05-06 | End: 2023-05-10

## 2023-05-06 RX ORDER — FENTANYL CITRATE 50 UG/ML
12.5 INJECTION INTRAVENOUS ONCE
Refills: 0 | Status: DISCONTINUED | OUTPATIENT
Start: 2023-05-06 | End: 2023-05-06

## 2023-05-06 RX ORDER — DEXTROSE 50 % IN WATER 50 %
25 SYRINGE (ML) INTRAVENOUS ONCE
Refills: 0 | Status: DISCONTINUED | OUTPATIENT
Start: 2023-05-06 | End: 2023-05-10

## 2023-05-06 RX ORDER — VASOPRESSIN 20 [USP'U]/ML
0.02 INJECTION INTRAVENOUS
Qty: 40 | Refills: 0 | Status: DISCONTINUED | OUTPATIENT
Start: 2023-05-06 | End: 2023-05-07

## 2023-05-06 RX ORDER — OXYCODONE HYDROCHLORIDE 5 MG/1
10 TABLET ORAL EVERY 6 HOURS
Refills: 0 | Status: DISCONTINUED | OUTPATIENT
Start: 2023-05-06 | End: 2023-05-11

## 2023-05-06 RX ORDER — INSULIN LISPRO 100/ML
VIAL (ML) SUBCUTANEOUS
Refills: 0 | Status: DISCONTINUED | OUTPATIENT
Start: 2023-05-06 | End: 2023-05-10

## 2023-05-06 RX ORDER — DEXTROSE 50 % IN WATER 50 %
15 SYRINGE (ML) INTRAVENOUS ONCE
Refills: 0 | Status: DISCONTINUED | OUTPATIENT
Start: 2023-05-06 | End: 2023-05-10

## 2023-05-06 RX ORDER — DEXTROSE 50 % IN WATER 50 %
12.5 SYRINGE (ML) INTRAVENOUS ONCE
Refills: 0 | Status: DISCONTINUED | OUTPATIENT
Start: 2023-05-06 | End: 2023-05-10

## 2023-05-06 RX ORDER — ACETAMINOPHEN 500 MG
650 TABLET ORAL EVERY 6 HOURS
Refills: 0 | Status: DISCONTINUED | OUTPATIENT
Start: 2023-05-06 | End: 2023-05-16

## 2023-05-06 RX ORDER — FENTANYL CITRATE 50 UG/ML
25 INJECTION INTRAVENOUS ONCE
Refills: 0 | Status: DISCONTINUED | OUTPATIENT
Start: 2023-05-06 | End: 2023-05-06

## 2023-05-06 RX ORDER — FUROSEMIDE 40 MG
20 TABLET ORAL ONCE
Refills: 0 | Status: COMPLETED | OUTPATIENT
Start: 2023-05-06 | End: 2023-05-06

## 2023-05-06 RX ORDER — PHENYLEPHRINE HYDROCHLORIDE 10 MG/ML
0.5 INJECTION INTRAVENOUS
Qty: 40 | Refills: 0 | Status: DISCONTINUED | OUTPATIENT
Start: 2023-05-06 | End: 2023-05-06

## 2023-05-06 RX ORDER — VASOPRESSIN 20 [USP'U]/ML
0.03 INJECTION INTRAVENOUS
Qty: 40 | Refills: 0 | Status: DISCONTINUED | OUTPATIENT
Start: 2023-05-06 | End: 2023-05-06

## 2023-05-06 RX ORDER — ALBUMIN HUMAN 25 %
250 VIAL (ML) INTRAVENOUS
Refills: 0 | Status: COMPLETED | OUTPATIENT
Start: 2023-05-06 | End: 2023-05-06

## 2023-05-06 RX ORDER — ATORVASTATIN CALCIUM 80 MG/1
20 TABLET, FILM COATED ORAL AT BEDTIME
Refills: 0 | Status: DISCONTINUED | OUTPATIENT
Start: 2023-05-06 | End: 2023-05-16

## 2023-05-06 RX ORDER — PANTOPRAZOLE SODIUM 20 MG/1
40 TABLET, DELAYED RELEASE ORAL
Refills: 0 | Status: DISCONTINUED | OUTPATIENT
Start: 2023-05-06 | End: 2023-05-13

## 2023-05-06 RX ORDER — OXYCODONE HYDROCHLORIDE 5 MG/1
5 TABLET ORAL EVERY 6 HOURS
Refills: 0 | Status: DISCONTINUED | OUTPATIENT
Start: 2023-05-06 | End: 2023-05-10

## 2023-05-06 RX ADMIN — Medication 400 MILLIGRAM(S): at 07:30

## 2023-05-06 RX ADMIN — Medication 1000 MILLIGRAM(S): at 07:45

## 2023-05-06 RX ADMIN — FENTANYL CITRATE 12.5 MICROGRAM(S): 50 INJECTION INTRAVENOUS at 16:01

## 2023-05-06 RX ADMIN — SENNA PLUS 2 TABLET(S): 8.6 TABLET ORAL at 22:00

## 2023-05-06 RX ADMIN — DEXMEDETOMIDINE HYDROCHLORIDE IN 0.9% SODIUM CHLORIDE 8.75 MICROGRAM(S)/KG/HR: 4 INJECTION INTRAVENOUS at 19:51

## 2023-05-06 RX ADMIN — Medication 1000 MILLIGRAM(S): at 20:10

## 2023-05-06 RX ADMIN — LACOSAMIDE 120 MILLIGRAM(S): 50 TABLET ORAL at 18:37

## 2023-05-06 RX ADMIN — VASOPRESSIN 3 UNIT(S)/MIN: 20 INJECTION INTRAVENOUS at 23:15

## 2023-05-06 RX ADMIN — Medication 100 MILLIGRAM(S): at 15:11

## 2023-05-06 RX ADMIN — FENTANYL CITRATE 25 MICROGRAM(S): 50 INJECTION INTRAVENOUS at 22:03

## 2023-05-06 RX ADMIN — Medication 400 MILLIGRAM(S): at 19:51

## 2023-05-06 RX ADMIN — FENTANYL CITRATE 12.5 MICROGRAM(S): 50 INJECTION INTRAVENOUS at 15:46

## 2023-05-06 RX ADMIN — LACOSAMIDE 120 MILLIGRAM(S): 50 TABLET ORAL at 07:23

## 2023-05-06 RX ADMIN — CHLORHEXIDINE GLUCONATE 15 MILLILITER(S): 213 SOLUTION TOPICAL at 05:26

## 2023-05-06 RX ADMIN — Medication 125 MILLILITER(S): at 03:08

## 2023-05-06 RX ADMIN — PHENYLEPHRINE HYDROCHLORIDE 13.1 MICROGRAM(S)/KG/MIN: 10 INJECTION INTRAVENOUS at 04:24

## 2023-05-06 RX ADMIN — PANTOPRAZOLE SODIUM 40 MILLIGRAM(S): 20 TABLET, DELAYED RELEASE ORAL at 11:44

## 2023-05-06 RX ADMIN — ATORVASTATIN CALCIUM 20 MILLIGRAM(S): 80 TABLET, FILM COATED ORAL at 22:32

## 2023-05-06 RX ADMIN — Medication 100 MILLIGRAM(S): at 21:09

## 2023-05-06 RX ADMIN — FENTANYL CITRATE 12.5 MICROGRAM(S): 50 INJECTION INTRAVENOUS at 17:05

## 2023-05-06 RX ADMIN — Medication 125 MILLILITER(S): at 04:10

## 2023-05-06 RX ADMIN — Medication 400 MILLIGRAM(S): at 13:26

## 2023-05-06 RX ADMIN — Medication 60 MILLIGRAM(S): at 05:24

## 2023-05-06 RX ADMIN — Medication 25 GRAM(S): at 00:19

## 2023-05-06 RX ADMIN — Medication 20 MILLIGRAM(S): at 15:40

## 2023-05-06 RX ADMIN — FENTANYL CITRATE 25 MICROGRAM(S): 50 INJECTION INTRAVENOUS at 21:34

## 2023-05-06 RX ADMIN — CHLORHEXIDINE GLUCONATE 1 APPLICATION(S): 213 SOLUTION TOPICAL at 11:49

## 2023-05-06 RX ADMIN — Medication 60 MILLIGRAM(S): at 17:19

## 2023-05-06 RX ADMIN — VASOPRESSIN 4.5 UNIT(S)/MIN: 20 INJECTION INTRAVENOUS at 07:00

## 2023-05-06 RX ADMIN — FENTANYL CITRATE 12.5 MICROGRAM(S): 50 INJECTION INTRAVENOUS at 16:49

## 2023-05-06 RX ADMIN — Medication 1000 MILLIGRAM(S): at 14:35

## 2023-05-06 RX ADMIN — Medication 100 MILLIGRAM(S): at 06:32

## 2023-05-06 NOTE — PROGRESS NOTE ADULT - SUBJECTIVE AND OBJECTIVE BOX
CTICU  CRITICAL  CARE  attending     Hand off received 					   Pertinent clinical, laboratory, radiographic, hemodynamic, echocardiographic, respiratory data, microbiologic data and chart were reviewed and analyzed frequently throughout the course of the day and night  Patient seen and examined with CTS/ SH attending at bedside    Pt is a 53y , Male, post op day # 1 s/p 2nd stage TEVAR ( had total arch replaced 2 months ago);     post procedure:    extubated early am today  mild weakness of proximal LE strength; L>R  Vasopressin to maintain MAP >100  Acute post H'gic anemia  s/p 1 unit PRBC    53 YO Male, current every day marijuana use, w/ PMHx of HTN, type A aortic dissection s/p Dacron grafts, AV resuspension in 2013, CAD s/p CABG x 1 SVG to RCA (5/2013 with Dr. Medina), seizure disorder (last episode on 7/4/22) who was admitted for surgical mgmt of progression of aneurysmal disease. Recent admission 3/20-4/14 during which patient underwent replacement of transverse aortic arch, second stage thoracic endovascular aortic repair, aorto-axillary bypass, AV replacement (bio 23mm), and CABG x 1 (SVG-RCA) EF 75% (Ignacio, 3/20). Post op cb lactic acidosis, severe cardiogenic and vasogenic shock, TREY requiring CVVHD and temporary dialysis requirement. Patient presented to [facility] ED 4/27 for syncope vs seizure episode at home, falling onto back of head. Nuerological workup proformed during that visit revealed a negative EEG, but given clinical picture of seizures, pt started on vimpat and depakote. Imaging preformed during that admission did no require acute surgical intervention on endoleak.   Pt presented to [facility] ED last night complaning of worsening epigastric pain. CTAP revealed continued endoleak. Sent to [facility] for further evaluation. Pt will be taken to the OR with Dr. Pierre this morning for a completion TEVAR. Pt consented, OR aware.         , FAMILY HISTORY:  No pertinent family history in first degree relatives    PAST MEDICAL & SURGICAL HISTORY:  HTN (hypertension)      Aortic dissection      CAD (coronary artery disease)      Seizure disorder      S/P aortic bifurcation bypass graft      S/P CABG x 1        Patient is a 53y old  Male who presents with a chief complaint of endoleak, abdominal pain (06 May 2023 11:57)      14 system review was unremarkable  acute changes include acute respiratory failure  Vital signs, hemodynamic and respiratory parameters were reviewed from the bedside nursing flowsheet.  ICU Vital Signs Last 24 Hrs  T(C): 36.8 (06 May 2023 10:00), Max: 37.4 (05 May 2023 20:57)  T(F): 98.3 (06 May 2023 10:00), Max: 99.4 (06 May 2023 05:09)  HR: 81 (06 May 2023 13:00) (68 - 134)  BP: 142/73 (06 May 2023 10:00) (142/73 - 142/73)  BP(mean): 101 (06 May 2023 10:00) (101 - 101)  ABP: 154/67 (06 May 2023 13:00) (103/50 - 166/81)  ABP(mean): 96 (06 May 2023 13:00) (66 - 116)  RR: 18 (06 May 2023 13:00) (12 - 20)  SpO2: 100% (06 May 2023 13:00) (97% - 100%)    O2 Parameters below as of 06 May 2023 13:00  Patient On (Oxygen Delivery Method): nasal cannula w/ humidification  O2 Flow (L/min): 2        Adult Advanced Hemodynamics Last 24 Hrs  CVP(mm Hg): --  CVP(cm H2O): --  CO: --  CI: --  PA: --  PA(mean): --  PCWP: --  SVR: --  SVRI: --  PVR: --  PVRI: --, ABG - ( 06 May 2023 14:07 )  pH, Arterial: 7.43  pH, Blood: x     /  pCO2: 37    /  pO2: 91    / HCO3: 25    / Base Excess: 0.5   /  SaO2: 99.3              Mode: CPAP with PS  FiO2: 50  PEEP: 5  MAP: 7  PIP: 18    Intake and output was reviewed and the fluid balance was calculated  Daily     Daily   I&O's Summary    05 May 2023 07:01  -  06 May 2023 07:00  --------------------------------------------------------  IN: 2217.3 mL / OUT: 1020 mL / NET: 1197.3 mL    06 May 2023 07:01  -  06 May 2023 15:18  --------------------------------------------------------  IN: 875.5 mL / OUT: 283 mL / NET: 592.5 mL        All lines and drain sites were assessed  [facility]      POCT Blood Glucose.: 100 mg/dL (06 May 2023 14:01)    No acute change in mental status  Auscultation of the chest reveals equal bs  Abdomen is soft  Extremities are warm and well perfused  Wounds appear clean and unremarkable  Antibiotics are periop    labs  CBC Full  -  ( 06 May 2023 13:54 )  WBC Count : 11.87 K/uL  RBC Count : 2.94 M/uL  Hemoglobin : 8.8 g/dL  Hematocrit : 26.0 %  Platelet Count - Automated : 172 K/uL  Mean Cell Volume : 88.4 fl  Mean Cell Hemoglobin : 29.9 pg  Mean Cell Hemoglobin Concentration : 33.8 gm/dL  Auto Neutrophil # : x  Auto Lymphocyte # : x  Auto Monocyte # : x  Auto Eosinophil # : x  Auto Basophil # : x  Auto Neutrophil % : x  Auto Lymphocyte % : x  Auto Monocyte % : x  Auto Eosinophil % : x  Auto Basophil % : x    05-06    136  |  103  |  21  ----------------------------<  96  4.2   |  24  |  1.34<H>    Ca    8.0<L>      06 May 2023 13:54  Phos  4.5     05-06  Mg     2.3     05-06    TPro  5.3<L>  /  Alb  2.7<L>  /  TBili  0.6  /  DBili  x   /  AST  10  /  ALT  <5<L>  /  AlkPhos  52  05-06    PT/INR - ( 06 May 2023 13:54 )   PT: 17.5 sec;   INR: 1.46          PTT - ( 06 May 2023 13:54 )  PTT:36.5 sec  The current medications were reviewed   MEDICATIONS  (STANDING):  acetaminophen   IVPB .. 1000 milliGRAM(s) IV Intermittent once  ceFAZolin   IVPB 2000 milliGRAM(s) IV Intermittent every 8 hours  chlorhexidine 0.12% Liquid 15 milliLiter(s) Oral Mucosa every 12 hours  chlorhexidine 2% Cloths 1 Application(s) Topical daily  dexMEDEtomidine Infusion 0.5 MICROgram(s)/kG/Hr (8.75 mL/Hr) IV Continuous <Continuous>  dextrose 50% Injectable 50 milliLiter(s) IV Push every 15 minutes  dextrose 50% Injectable 25 milliLiter(s) IV Push every 15 minutes  insulin regular Infusion 1 Unit(s)/Hr (1 mL/Hr) IV Continuous <Continuous>  lacosamide IVPB 100 milliGRAM(s) IV Intermittent every 12 hours  niCARdipine Infusion 5 mG/Hr (25 mL/Hr) IV Continuous <Continuous>  pantoprazole  Injectable 40 milliGRAM(s) IV Push daily  phenylephrine    Infusion 0.5 MICROgram(s)/kG/Min (13.1 mL/Hr) IV Continuous <Continuous>  polyethylene glycol 3350 17 Gram(s) Oral daily  senna 2 Tablet(s) Oral at bedtime  sodium chloride 0.9%. 1000 milliLiter(s) (10 mL/Hr) IV Continuous <Continuous>  valproate sodium  IVPB 1000 milliGRAM(s) IV Intermittent every 12 hours  vasopressin Infusion 0.03 Unit(s)/Min (4.5 mL/Hr) IV Continuous <Continuous>    MEDICATIONS  (PRN):       PROBLEM LIST/ ASSESSMENT:  HEALTH ISSUES - PROBLEM Dx:      ,   Patient is a 53y old  Male who presents with a chief complaint of endoleak, abdominal pain (06 May 2023 11:57)     s/p TEVAR      My plan includes :    Maintain MAP >100  Maintain ICP <12  Continue CSF diversion @ 10-20 cc/hr; depending on motor exam and ICP  supplemental O2 as needed  Transfuse to maintain Hct around 30 ( to optimize oxygen carrying capacity : to minimize SC ischemia if any)  volume resuscitate as needed  close hemodynamic, ventilatory and drain monitoring and management per post op routine    Monitor for arrhythmias and monitor parameters for organ perfusion  monitor neurologic status  Head of the bed should remain elevated to 45 deg .   chest PT and IS will be encouraged  monitor adequacy of oxygenation and ventilation and attempt to wean oxygen  Nutritional goals will be met using po eventually , ensure adequate caloric intake and montior the same  Stress ulcer and VTE prophylaxis will be achieved    Glycemic control is satisfactory  Electrolytes have been repleted as necessary and wound care has been carried out. Pain control has been achieved.   agressive physical therapy and early mobility and ambulation goals will be met   The family was updated about the course and plan  CRITICAL CARE TIME SPENT in evaluation and management, reassessments, review and interpretation of labs and x-rays, ventilator and hemodynamic management, formulating a plan and coordinating care: ___90____ MIN.  Time does not include procedural time.  CTICU ATTENDING     					    Dalton Martinez MD

## 2023-05-06 NOTE — CONSULT NOTE ADULT - SUBJECTIVE AND OBJECTIVE BOX
Neurology consult:     VINCENT MARIE 53y M w/ h/o seizure controlled w/ 2 AED admitted aortic aneurysmal repair. Neurology consulted for acute metal status changes. He received TEVAR , extubated  3am and off Dexmedetomidine 6am. He remained lethargic, slow to response and feeling weak 4hours post sedation cessation. Primary and nursing team reported that ICP stable 11-12.  He reported that he was having HA, feeling nausea and in pain before but not currently. His last recall seizure was 2 weeks ago. He was unable to describe seizure semiology currently.     See remaining HPI and PMhx below      HPI:  54yo M, current every day marijuana use, w/ PMHx of HTN, type A aortic dissection s/p Dacron grafts, AV resuspension in , CAD s/p CABG x 1 SVG to RCA (2013 with Dr. Medina), seizure disorder (last episode on 22) who was admitted for surgical mgmt of progression of aneurysmal disease. Recent admission 3/20- during which patient underwent replacement of transverse aortic arch, second stage thoracic endovascular aortic repair, aorto-axillary bypass, AV replacement (bio 23mm), and CABG x 1 (SVG-RCA) EF 75% (Ignacio, 3/20). Post op cb lactic acidosis, severe cardiogenic and vasogenic shock, TREY requiring CVVHD and temporary dialysis requirement. Patient presented to [facility] ED  for syncope vs seizure episode at home, falling onto back of head. Nuerological workup proformed during that visit revealed a negative EEG, but given clinical picture of seizures, pt started on vimpat and depakote. Imaging preformed during that admission did no require acute surgical intervention on endoleak.   Pt presented to [facility] ED last night complaning of worsening epigastric pain. CTAP revealed continued endoleak. Sent to [facility] for further evaluation. Pt will be taken to the OR with Dr. Pierre this morning for a completion TEVAR. Pt consented, OR aware.     (05 May 2023 09:41)      MEDICATIONS    ceFAZolin   IVPB 2000 milliGRAM(s) IV Intermittent every 8 hours  chlorhexidine 0.12% Liquid 15 milliLiter(s) Oral Mucosa every 12 hours  chlorhexidine 2% Cloths 1 Application(s) Topical daily  dexMEDEtomidine Infusion 0.5 MICROgram(s)/kG/Hr IV Continuous <Continuous>  dextrose 50% Injectable 50 milliLiter(s) IV Push every 15 minutes  dextrose 50% Injectable 25 milliLiter(s) IV Push every 15 minutes  insulin regular Infusion 1 Unit(s)/Hr IV Continuous <Continuous>  lacosamide IVPB 100 milliGRAM(s) IV Intermittent every 12 hours  niCARdipine Infusion 5 mG/Hr IV Continuous <Continuous>  pantoprazole  Injectable 40 milliGRAM(s) IV Push daily  phenylephrine    Infusion 0.5 MICROgram(s)/kG/Min IV Continuous <Continuous>  polyethylene glycol 3350 17 Gram(s) Oral daily  senna 2 Tablet(s) Oral at bedtime  sodium chloride 0.9%. 1000 milliLiter(s) IV Continuous <Continuous>  valproate sodium  IVPB 1000 milliGRAM(s) IV Intermittent every 12 hours  vasopressin Infusion 0.03 Unit(s)/Min IV Continuous <Continuous>         Family history: No history of dementia, strokes, or seizures   FAMILY HISTORY: No pertinent family history in first degree relatives    SOCIAL HISTORY -- No history of tobacco or alcohol use     Allergies: No Known Allergies;     Vital Signs Last 24 Hrs  T(C): 36.8 (06 May 2023 10:00), Max: 37.4 (05 May 2023 20:57)  T(F): 98.3 (06 May 2023 10:00), Max: 99.4 (06 May 2023 05:09)  HR: 82 (06 May 2023 11:00) (62 - 134)  BP: 142/73 (06 May 2023 10:00) (142/73 - 142/73)  BP(mean): 101 (06 May 2023 10:00) (101 - 101)  RR: 20 (06 May 2023 11:00) (12 - 20)  SpO2: 100% (06 May 2023 11:00) (97% - 100%)    Parameters below as of 06 May 2023 11:00  Patient On (Oxygen Delivery Method): nasal cannula w/ humidification O2 Flow (L/min): 4    REVIEW OF SYSTEMS:    Constitutional: Weak and fatigue. headache and nausea; pain No fever, chills    On Neurological Examination:    Mental Status - Patient is Lethargic, oriented to person and time only.     Follows commands well and able to answer questions appropriately. Mood and affect  normal  Follow complex commands  Speech -   Fluent and Non-Dysarthria                         Cranial Nerves - Pupils 3 mm equal and reactive to light, extraocular eye movements intact.   Motor Exam -   Right upper  Left upper  Right lower  Left lower     With stimuli positive movement of all 4 extremities    Muscle tone - is normal all over. No asymmetry is seen.      Sensory    Bilateral intact to light touch    Gait -  normal  ataxia     GENERAL Exam:     Nontoxic , No Acute Distress   	  HEENT:  normocephalic, atraumatic  		  LUNGS:	Clear bilaterally  No Wheeze  Decreased bilaterally  	  HEART:	 Normal S1S2   No murmur RRR        	  GI/ ABDOMEN:  Soft  Non tender    EXTREMITIES:   No Edema  No Clubbing  No Cyanosis No Edema    MUSCULOSKELETAL: Normal Range of Motion  	   SKIN:      Normal   No Ecchymosis               LABS:  CBC Full  -  ( 06 May 2023 03:40 )  WBC Count : 11.84 K/uL  RBC Count : 3.11 M/uL  Hemoglobin : 9.3 g/dL  Hematocrit : 27.9 %  Platelet Count - Automated : 200 K/uL  Mean Cell Volume : 89.7 fl  Mean Cell Hemoglobin : 29.9 pg  Mean Cell Hemoglobin Concentration : 33.3 gm/dL  Auto Neutrophil # : x  Auto Lymphocyte # : x  Auto Monocyte # : x  Auto Eosinophil # : x  Auto Basophil # : x  Auto Neutrophil % : x  Auto Lymphocyte % : x  Auto Monocyte % : x  Auto Eosinophil % : x  Auto Basophil % : x    Urinalysis Basic - ( 05 May 2023 07:25 )    Color: Yellow / Appearance: Clear / S.010 / pH: x  Gluc: x / Ketone: NEGATIVE  / Bili: Negative / Urobili: 0.2 E.U./dL   Blood: x / Protein: 100 mg/dL / Nitrite: NEGATIVE   Leuk Esterase: Small / RBC: < 5 /HPF / WBC Many /HPF   Sq Epi: x / Non Sq Epi: x / Bacteria: Many /HPF      -    133<L>  |  101  |  19  ----------------------------<  93  4.4   |  25  |  1.45<H>    Ca    8.3<L>      06 May 2023 03:40  Phos  4.1     05-06  Mg     3.2     05-06    TPro  5.3<L>  /  Alb  2.3<L>  /  TBili  0.4  /  DBili  x   /  AST  11  /  ALT  <5<L>  /  AlkPhos  59  05-06    Hemoglobin A1C:     LIVER FUNCTIONS - ( 06 May 2023 03:40 )  Alb: 2.3 g/dL / Pro: 5.3 g/dL / ALK PHOS: 59 U/L / ALT: <5 U/L / AST: 11 U/L / GGT: x           Vitamin B12   PT/INR - ( 06 May 2023 03:40 )   PT: 16.5 sec;   INR: 1.38          PTT - ( 06 May 2023 03:40 )  PTT:34.7 sec      RADIOLOGY    EKG                     Neurology consult:     VINCENT MARIE 53y M w/ h/o seizure controlled w/ 2 AED admitted aortic aneurysmal repair. Neurology consulted for acute metal status changes. He received TEVAR , extubated  3am and off Dexmedetomidine 6am. He remained lethargic, slow to response and feeling weak 4hours post sedation cessation. Primary and nursing team reported that ICP stable 11-12.  He reported that he was having HA, feeling nausea and in pain before but not currently. His last recall seizure was 2 weeks ago. He was unable to describe seizure semiology currently.     See remaining HPI and PMhx below      HPI:  52yo M, current every day marijuana use, w/ PMHx of HTN, type A aortic dissection s/p Dacron grafts, AV resuspension in , CAD s/p CABG x 1 SVG to RCA (2013 with Dr. Medina), seizure disorder (last episode on 22) who was admitted for surgical mgmt of progression of aneurysmal disease. Recent admission 3/20- during which patient underwent replacement of transverse aortic arch, second stage thoracic endovascular aortic repair, aorto-axillary bypass, AV replacement (bio 23mm), and CABG x 1 (SVG-RCA) EF 75% (Ignacio, 3/20). Post op cb lactic acidosis, severe cardiogenic and vasogenic shock, TREY requiring CVVHD and temporary dialysis requirement. Patient presented to [facility] ED  for syncope vs seizure episode at home, falling onto back of head. Nuerological workup proformed during that visit revealed a negative EEG, but given clinical picture of seizures, pt started on vimpat and depakote. Imaging preformed during that admission did no require acute surgical intervention on endoleak.   Pt presented to [facility] ED last night complaning of worsening epigastric pain. CTAP revealed continued endoleak. Sent to [facility] for further evaluation. Pt will be taken to the OR with Dr. Pierre this morning for a completion TEVAR. Pt consented, OR aware.     (05 May 2023 09:41)      MEDICATIONS    ceFAZolin   IVPB 2000 milliGRAM(s) IV Intermittent every 8 hours  chlorhexidine 0.12% Liquid 15 milliLiter(s) Oral Mucosa every 12 hours  chlorhexidine 2% Cloths 1 Application(s) Topical daily  dexMEDEtomidine Infusion 0.5 MICROgram(s)/kG/Hr IV Continuous <Continuous>  dextrose 50% Injectable 50 milliLiter(s) IV Push every 15 minutes  dextrose 50% Injectable 25 milliLiter(s) IV Push every 15 minutes  insulin regular Infusion 1 Unit(s)/Hr IV Continuous <Continuous>  lacosamide IVPB 100 milliGRAM(s) IV Intermittent every 12 hours  niCARdipine Infusion 5 mG/Hr IV Continuous <Continuous>  pantoprazole  Injectable 40 milliGRAM(s) IV Push daily  phenylephrine    Infusion 0.5 MICROgram(s)/kG/Min IV Continuous <Continuous>  polyethylene glycol 3350 17 Gram(s) Oral daily  senna 2 Tablet(s) Oral at bedtime  sodium chloride 0.9%. 1000 milliLiter(s) IV Continuous <Continuous>  valproate sodium  IVPB 1000 milliGRAM(s) IV Intermittent every 12 hours  vasopressin Infusion 0.03 Unit(s)/Min IV Continuous <Continuous>         Family history: No history of dementia, strokes, or seizures   FAMILY HISTORY: No pertinent family history in first degree relatives    SOCIAL HISTORY -- No history of tobacco or alcohol use     Allergies: No Known Allergies;     Vital Signs Last 24 Hrs  T(C): 36.8 (06 May 2023 10:00), Max: 37.4 (05 May 2023 20:57)  T(F): 98.3 (06 May 2023 10:00), Max: 99.4 (06 May 2023 05:09)  HR: 82 (06 May 2023 11:00) (62 - 134)  BP: 142/73 (06 May 2023 10:00) (142/73 - 142/73)  BP(mean): 101 (06 May 2023 10:00) (101 - 101)  RR: 20 (06 May 2023 11:00) (12 - 20)  SpO2: 100% (06 May 2023 11:00) (97% - 100%)    Parameters below as of 06 May 2023 11:00  Patient On (Oxygen Delivery Method): nasal cannula w/ humidification O2 Flow (L/min): 4    REVIEW OF SYSTEMS:    Constitutional: Weak and fatigue. headache and nausea; pain No fever, chills    On Neurological Examination:    Mental Status - Patient is Lethargic, oriented to person and time only.     Follows commands well and able to answer questions appropriately. Mood and affect  normal  Follow complex commands  Speech -   Fluent and Non-Dysarthria                         Cranial Nerves - Visual acuity intact and full peripheral vision. Pupils 3 mm equal and reactive to light, extraocular eye movements intact. absent ptosis   Motor Exam - 4/5 UE/LE likely due to pain. No extremity or pronator drift. No tremors or spontaneous movement   Muscle tone - is normal all over. No asymmetry is seen.    Sensory    Bilateral intact to light touch and pain  Reflexes: 2+ DTR all extremities. Upgoing toes   Gait -  bed rest    LABS:  CBC Full  -  ( 06 May 2023 03:40 )  WBC Count : 11.84 K/uL  RBC Count : 3.11 M/uL  Hemoglobin : 9.3 g/dL  Hematocrit : 27.9 %  Platelet Count - Automated : 200 K/uL  Mean Cell Volume : 89.7 fl  Mean Cell Hemoglobin : 29.9 pg  Mean Cell Hemoglobin Concentration : 33.3 gm/dL    Urinalysis Basic - ( 05 May 2023 07:25 )    Color: Yellow / Appearance: Clear / S.010 / pH: x  Gluc: x / Ketone: NEGATIVE  / Bili: Negative / Urobili: 0.2 E.U./dL   Blood: x / Protein: 100 mg/dL / Nitrite: NEGATIVE   Leuk Esterase: Small / RBC: < 5 /HPF / WBC Many /HPF   Sq Epi: x / Non Sq Epi: x / Bacteria: Many /HPF      05-06    133<L>  |  101  |  19  ----------------------------<  93  4.4   |  25  |  1.45<H>    Ca    8.3<L>      06 May 2023 03:40  Phos  4.1     05-06  Mg     3.2     05-06    TPro  5.3<L>  /  Alb  2.3<L>  /  TBili  0.4  /  DBili  x   /  AST  11  /  ALT  <5<L>  /  AlkPhos  59  05-06    Hemoglobin A1C:     LIVER FUNCTIONS - ( 06 May 2023 03:40 )  Alb: 2.3 g/dL / Pro: 5.3 g/dL / ALK PHOS: 59 U/L / ALT: <5 U/L / AST: 11 U/L / GGT: x           Vitamin B12   PT/INR - ( 06 May 2023 03:40 )   PT: 16.5 sec;   INR: 1.38          PTT - ( 06 May 2023 03:40 )  PTT:34.7 sec      RADIOLOGY    EKG

## 2023-05-06 NOTE — AIRWAY REMOVAL NOTE  ADULT & PEDS - REASON ARTIFICAL AIRWAY REMOVED:
Reason for Call:  Other - Patient Questions    Detailed comments: Patient asked if a message could be sent to Argelia Castaneda with a few questions she has. First of all, she would like ot know how she can get allergy tested and possibly go on allergy shots. She is taking 10 mg cetirizine and it is not helping her at all. Second, She would like to discuss getting on a different birth control. Please call back to discuss further details or message on [employer].    Phone Number Patient can be reached at: Home number on file [phone] (home)    Best Time: Anytime    Can we leave a detailed message on this number? YES    Call taken on 6/27/2022 at 3:36 PM by Yumiko Mccarthy       reason for artificial airway has resolved

## 2023-05-06 NOTE — CONSULT NOTE ADULT - ASSESSMENT
This is a 52yo M w/ h/o seizure appeared to be controlled on 2 AED. Admitted for Aortic Aneurysmal repair. Epilepsy consulted for seizure concerns after prolonged mental status changes post extubation and sedation. He was seen 04/23 by the Epilepsy team w/o object EEG findings. During our assessment pt appeared to be in moderate to severe pain, however, communicating, following commands and non-focal neurologic exam. Unlikely an acute neurologic event or provoked seizure episode. Will continue to monitor as sedation washed out of his system and pain is better controlled.     Recommendations:  ok w/ NCCTH  EEG is not required   cont to monitor ICP  cont neurologic assessment and reconsult if any changes   cont to pain management       Please consult for any neurological changes or concerns  This is a 54yo M w/ h/o seizure appeared to be controlled on 2 AED. Admitted for Aortic Aneurysmal repair. Epilepsy consulted for seizure concerns after prolonged mental status changes post extubation and sedation. He was seen 04/23 by the Epilepsy team w/o object EEG findings. During our assessment pt appeared to be in moderate to severe pain, however, communicating, following commands and non-focal neurologic exam. Unlikely an acute neurologic event or provoked seizure episode. Recommend continue monitoring as sedation washed out of his system and pain is better controlled.     Recommendations:  ok w/ NCCTH  EEG is not required   cont to monitor ICP  cont neurologic assessment and reconsult if any changes   cont to pain management       Please consult for any neurological changes or concerns

## 2023-05-06 NOTE — PROGRESS NOTE ADULT - SUBJECTIVE AND OBJECTIVE BOX
CTICU  CRITICAL  CARE  attending     Hand off received 					   Pertinent clinical, laboratory, radiographic, hemodynamic, echocardiographic, respiratory data, microbiologic data and chart were reviewed and analyzed frequently throughout the course of the day and night    52 year old Male with HTN, type A aortic dissection s/p Dacron grafts, AV resuspension in 2013, CAD s/p CABG x 1 SVG to RCA (5/2013 with Dr. Medina), seizure disorder (last episode on 7/4/22)   He is a current every day marijuana user.   He was admitted for surgical management of progression of aneurysmal disease.   On 3/20/23 the patient underwent replacement of transverse aortic arch, second stage thoracic endovascular aortic repair, aorto-axillary bypass, AV replacement (bio 23mm),CABG x 1 (SVG-RCA).   Postoperative course complicated by lactic acidosis, severe cardiogenic and vasogenic shock, TREY requiring CVVHD and temporary dialysis requirement. Eventually discharged 4/14/23.   The patient presented to [facility] Emergency Room 4/27 for syncope vs seizure episode at home, falling backwards on the head.   Neurological workup performed during that visit revealed a negative EEG, but given clinical picture of seizures, he was started on vimpat and depakote.   Imaging preformed during that admission did no necessitate acute surgical intervention on endoleak.   He was readmitted to [facility] complaining of worsening epigastric pain.   CTAP revealed continued endoleak.   He was transferred to [facility] for further evaluation.    S/P Second stage TEVAR.       FAMILY HISTORY:  No pertinent family history in first degree relatives    PAST MEDICAL & SURGICAL HISTORY:  HTN (hypertension)  Aortic dissection  CAD (coronary artery disease)  Seizure disorder  S/P aortic bifurcation bypass graft  H/O CABG x 1         14 system review was unremarkable    Vital signs, hemodynamic and respiratory parameters were reviewed from the bedside nursing flow sheet.  ICU Vital Signs Last 24 Hrs  T(C): 36.6 (06 May 2023 22:09), Max: 37.4 (06 May 2023 05:09)  T(F): 97.8 (06 May 2023 22:09), Max: 99.4 (06 May 2023 05:09)  HR: 84 (06 May 2023 23:00) (74 - 108)  BP: 149/79 (06 May 2023 16:00) (139/69 - 149/79)  BP(mean): 105 (06 May 2023 16:00) (97 - 105)  ABP: 138/68 (06 May 2023 23:00) (103/50 - 174/74)  ABP(mean): 91 (06 May 2023 23:00) (66 - 113)  RR: 18 (06 May 2023 23:00) (17 - 20)  SpO2: 100% (06 May 2023 23:00) (100% - 100%)    O2 Parameters below as of 06 May 2023 23:00  Patient On (Oxygen Delivery Method): nasal cannula w/ humidification  O2 Flow (L/min): 2        Adult Advanced Hemodynamics Last 24 Hrs  CVP(mm Hg): --  CVP(cm H2O): --  CO: --  CI: --  PA: --  PA(mean): --  PCWP: --  SVR: --  SVRI: --  PVR: --  PVRI: --, ABG - ( 06 May 2023 14:07 )  pH, Arterial: 7.43  pH, Blood: x     /  pCO2: 37    /  pO2: 91    / HCO3: 25    / Base Excess: 0.5   /  SaO2: 99.3              Mode: CPAP with PS  FiO2: 50  PEEP: 5  MAP: 7  PIP: 18    Intake and output was reviewed and the fluid balance was calculated  Daily     Daily   I&O's Summary    05 May 2023 07:01  -  06 May 2023 07:00  --------------------------------------------------------  IN: 2217.3 mL / OUT: 1020 mL / NET: 1197.3 mL    06 May 2023 07:01  -  06 May 2023 23:43  --------------------------------------------------------  IN: 1564.8 mL / OUT: 1188 mL / NET: 376.8 mL        All lines and drain sites were assessed    Neuro: No change in the mental status from the baseline. Follows commands. Moves all 4 extremities.  Neck: No JVD.  CVS: S1, S2, No S3.  Lungs: Good air entry bilaterally.  Abd: Soft. No tenderness. + Bowel sounds.  Vascular: + DP/PT.  Extremities: No edema.  Lymphatic: Normal.  Skin: No abnormalities.      labs  CBC Full  -  ( 06 May 2023 13:54 )  WBC Count : 11.87 K/uL  RBC Count : 2.94 M/uL  Hemoglobin : 8.8 g/dL  Hematocrit : 26.0 %  Platelet Count - Automated : 172 K/uL  Mean Cell Volume : 88.4 fl  Mean Cell Hemoglobin : 29.9 pg  Mean Cell Hemoglobin Concentration : 33.8 gm/dL  Auto Neutrophil # : x  Auto Lymphocyte # : x  Auto Monocyte # : x  Auto Eosinophil # : x  Auto Basophil # : x  Auto Neutrophil % : x  Auto Lymphocyte % : x  Auto Monocyte % : x  Auto Eosinophil % : x  Auto Basophil % : x    05-06    136  |  103  |  21  ----------------------------<  96  4.2   |  24  |  1.34<H>    Ca    8.0<L>      06 May 2023 13:54  Phos  4.5     05-06  Mg     2.3     05-06    TPro  5.3<L>  /  Alb  2.7<L>  /  TBili  0.6  /  DBili  x   /  AST  10  /  ALT  <5<L>  /  AlkPhos  52  05-06    PT/INR - ( 06 May 2023 13:54 )   PT: 17.5 sec;   INR: 1.46          PTT - ( 06 May 2023 13:54 )  PTT:36.5 sec  The current medications were reviewed   MEDICATIONS  (STANDING):  atorvastatin 20 milliGRAM(s) Oral at bedtime  ceFAZolin   IVPB 2000 milliGRAM(s) IV Intermittent every 8 hours  chlorhexidine 2% Cloths 1 Application(s) Topical daily  dexMEDEtomidine Infusion 0.5 MICROgram(s)/kG/Hr (8.75 mL/Hr) IV Continuous <Continuous>  dextrose 5%. 1000 milliLiter(s) (100 mL/Hr) IV Continuous <Continuous>  dextrose 5%. 1000 milliLiter(s) (50 mL/Hr) IV Continuous <Continuous>  dextrose 50% Injectable 25 Gram(s) IV Push once  dextrose 50% Injectable 12.5 Gram(s) IV Push once  dextrose 50% Injectable 25 Gram(s) IV Push once  glucagon  Injectable 1 milliGRAM(s) IntraMuscular once  insulin lispro (ADMELOG) corrective regimen sliding scale   SubCutaneous three times a day before meals  lacosamide IVPB 100 milliGRAM(s) IV Intermittent every 12 hours  pantoprazole    Tablet 40 milliGRAM(s) Oral before breakfast  polyethylene glycol 3350 17 Gram(s) Oral daily  senna 2 Tablet(s) Oral at bedtime  sodium chloride 0.9%. 1000 milliLiter(s) (10 mL/Hr) IV Continuous <Continuous>  valproate sodium  IVPB 1000 milliGRAM(s) IV Intermittent every 12 hours  vasopressin Infusion 0.02 Unit(s)/Min (3 mL/Hr) IV Continuous <Continuous>    MEDICATIONS  (PRN):  acetaminophen     Tablet .. 650 milliGRAM(s) Oral every 6 hours PRN Mild Pain (1 - 3)  dextrose Oral Gel 15 Gram(s) Oral once PRN Blood Glucose LESS THAN 70 milliGRAM(s)/deciliter  oxyCODONE    IR 5 milliGRAM(s) Oral every 6 hours PRN Moderate Pain (4 - 6)  oxyCODONE    IR 10 milliGRAM(s) Oral every 6 hours PRN Severe Pain (7 - 10)        53 year old  Male admitted with abdominal pain due to endoleak.  S/P Second Stage TEVAR.  Hemodynamically stable.  Good oxygenation.  Fair urine out put.        My plan includes :  ICP monitoring. (Try to keep ICP below 12).  Keep MAP .  Continue CSF diversion.  Antiseizure Rx.  Address pain issues.  Statin and Betablocker.  Close hemodynamic monitoring   Monitor for arrhythmias and monitor parameters for organ perfusion  Monitor neurologic status  Monitor renal function.  Head of the bed should remain elevated to 45 deg .   Chest PT and IS will be encouraged  Monitor adequacy of oxygenation and ventilation and attempt to wean oxygen  Nutritional goals will be met using po eventually , ensure adequate caloric intake and monitor the same  Stress ulcer and VTE prophylaxis will be achieved    Glycemic control is satisfactory  Electrolytes have been repleted as necessary and wound care has been carried out. Pain control has been achieved.   Aggressive physical therapy and early mobility and ambulation goals will be met   The family was updated about the course and plan  CRITICAL CARE TIME SPENT in evaluation and management, review and interpretation of labs and x-rays, hemodynamic management, formulating a plan and coordinating care: ___30____ MIN.  Time does not include procedural time.  CTICU ATTENDING     					    José Miguel Torres MD

## 2023-05-07 LAB
ALBUMIN SERPL ELPH-MCNC: 2.5 G/DL — LOW (ref 3.3–5)
ALBUMIN SERPL ELPH-MCNC: 2.8 G/DL — LOW (ref 3.3–5)
ALP SERPL-CCNC: 72 U/L — SIGNIFICANT CHANGE UP (ref 40–120)
ALP SERPL-CCNC: 77 U/L — SIGNIFICANT CHANGE UP (ref 40–120)
ALT FLD-CCNC: <5 U/L — LOW (ref 10–45)
ALT FLD-CCNC: <5 U/L — LOW (ref 10–45)
ANION GAP SERPL CALC-SCNC: 10 MMOL/L — SIGNIFICANT CHANGE UP (ref 5–17)
ANION GAP SERPL CALC-SCNC: 8 MMOL/L — SIGNIFICANT CHANGE UP (ref 5–17)
APTT BLD: 37.8 SEC — HIGH (ref 27.5–35.5)
APTT BLD: 86.1 SEC — HIGH (ref 27.5–35.5)
AST SERPL-CCNC: 10 U/L — SIGNIFICANT CHANGE UP (ref 10–40)
AST SERPL-CCNC: 10 U/L — SIGNIFICANT CHANGE UP (ref 10–40)
BILIRUB SERPL-MCNC: 0.3 MG/DL — SIGNIFICANT CHANGE UP (ref 0.2–1.2)
BILIRUB SERPL-MCNC: 0.3 MG/DL — SIGNIFICANT CHANGE UP (ref 0.2–1.2)
BUN SERPL-MCNC: 21 MG/DL — SIGNIFICANT CHANGE UP (ref 7–23)
BUN SERPL-MCNC: 22 MG/DL — SIGNIFICANT CHANGE UP (ref 7–23)
CALCIUM SERPL-MCNC: 8 MG/DL — LOW (ref 8.4–10.5)
CALCIUM SERPL-MCNC: 8.4 MG/DL — SIGNIFICANT CHANGE UP (ref 8.4–10.5)
CHLORIDE SERPL-SCNC: 102 MMOL/L — SIGNIFICANT CHANGE UP (ref 96–108)
CHLORIDE SERPL-SCNC: 102 MMOL/L — SIGNIFICANT CHANGE UP (ref 96–108)
CO2 SERPL-SCNC: 25 MMOL/L — SIGNIFICANT CHANGE UP (ref 22–31)
CO2 SERPL-SCNC: 25 MMOL/L — SIGNIFICANT CHANGE UP (ref 22–31)
CREAT SERPL-MCNC: 1.35 MG/DL — HIGH (ref 0.5–1.3)
CREAT SERPL-MCNC: 1.4 MG/DL — HIGH (ref 0.5–1.3)
EGFR: 60 ML/MIN/1.73M2 — SIGNIFICANT CHANGE UP
EGFR: 62 ML/MIN/1.73M2 — SIGNIFICANT CHANGE UP
GAS PNL BLDA: SIGNIFICANT CHANGE UP
GAS PNL BLDA: SIGNIFICANT CHANGE UP
GLUCOSE BLDC GLUCOMTR-MCNC: 107 MG/DL — HIGH (ref 70–99)
GLUCOSE BLDC GLUCOMTR-MCNC: 114 MG/DL — HIGH (ref 70–99)
GLUCOSE BLDC GLUCOMTR-MCNC: 115 MG/DL — HIGH (ref 70–99)
GLUCOSE SERPL-MCNC: 111 MG/DL — HIGH (ref 70–99)
GLUCOSE SERPL-MCNC: 136 MG/DL — HIGH (ref 70–99)
HCT VFR BLD CALC: 29.1 % — LOW (ref 39–50)
HCT VFR BLD CALC: 31 % — LOW (ref 39–50)
HGB BLD-MCNC: 10 G/DL — LOW (ref 13–17)
HGB BLD-MCNC: 10.3 G/DL — LOW (ref 13–17)
INR BLD: 1.54 — HIGH (ref 0.88–1.16)
INR BLD: 1.57 — HIGH (ref 0.88–1.16)
MAGNESIUM SERPL-MCNC: 2.1 MG/DL — SIGNIFICANT CHANGE UP (ref 1.6–2.6)
MAGNESIUM SERPL-MCNC: 2.2 MG/DL — SIGNIFICANT CHANGE UP (ref 1.6–2.6)
MCHC RBC-ENTMCNC: 29.3 PG — SIGNIFICANT CHANGE UP (ref 27–34)
MCHC RBC-ENTMCNC: 30.3 PG — SIGNIFICANT CHANGE UP (ref 27–34)
MCHC RBC-ENTMCNC: 33.2 GM/DL — SIGNIFICANT CHANGE UP (ref 32–36)
MCHC RBC-ENTMCNC: 34.4 GM/DL — SIGNIFICANT CHANGE UP (ref 32–36)
MCV RBC AUTO: 88.2 FL — SIGNIFICANT CHANGE UP (ref 80–100)
MCV RBC AUTO: 88.3 FL — SIGNIFICANT CHANGE UP (ref 80–100)
NRBC # BLD: 0 /100 WBCS — SIGNIFICANT CHANGE UP (ref 0–0)
NRBC # BLD: 0 /100 WBCS — SIGNIFICANT CHANGE UP (ref 0–0)
PHOSPHATE SERPL-MCNC: 4.5 MG/DL — SIGNIFICANT CHANGE UP (ref 2.5–4.5)
PHOSPHATE SERPL-MCNC: 4.8 MG/DL — HIGH (ref 2.5–4.5)
PLATELET # BLD AUTO: 195 K/UL — SIGNIFICANT CHANGE UP (ref 150–400)
PLATELET # BLD AUTO: 228 K/UL — SIGNIFICANT CHANGE UP (ref 150–400)
POTASSIUM SERPL-MCNC: 4 MMOL/L — SIGNIFICANT CHANGE UP (ref 3.5–5.3)
POTASSIUM SERPL-MCNC: 4.1 MMOL/L — SIGNIFICANT CHANGE UP (ref 3.5–5.3)
POTASSIUM SERPL-SCNC: 4 MMOL/L — SIGNIFICANT CHANGE UP (ref 3.5–5.3)
POTASSIUM SERPL-SCNC: 4.1 MMOL/L — SIGNIFICANT CHANGE UP (ref 3.5–5.3)
PROT SERPL-MCNC: 5.4 G/DL — LOW (ref 6–8.3)
PROT SERPL-MCNC: 6.2 G/DL — SIGNIFICANT CHANGE UP (ref 6–8.3)
PROTHROM AB SERPL-ACNC: 18.4 SEC — HIGH (ref 10.5–13.4)
PROTHROM AB SERPL-ACNC: 18.8 SEC — HIGH (ref 10.5–13.4)
RBC # BLD: 3.3 M/UL — LOW (ref 4.2–5.8)
RBC # BLD: 3.51 M/UL — LOW (ref 4.2–5.8)
RBC # FLD: 16.1 % — HIGH (ref 10.3–14.5)
RBC # FLD: 16.1 % — HIGH (ref 10.3–14.5)
SODIUM SERPL-SCNC: 135 MMOL/L — SIGNIFICANT CHANGE UP (ref 135–145)
SODIUM SERPL-SCNC: 137 MMOL/L — SIGNIFICANT CHANGE UP (ref 135–145)
WBC # BLD: 15.46 K/UL — HIGH (ref 3.8–10.5)
WBC # BLD: 18.89 K/UL — HIGH (ref 3.8–10.5)
WBC # FLD AUTO: 15.46 K/UL — HIGH (ref 3.8–10.5)
WBC # FLD AUTO: 18.89 K/UL — HIGH (ref 3.8–10.5)

## 2023-05-07 RX ORDER — FUROSEMIDE 40 MG
20 TABLET ORAL ONCE
Refills: 0 | Status: COMPLETED | OUTPATIENT
Start: 2023-05-07 | End: 2023-05-07

## 2023-05-07 RX ORDER — MIDODRINE HYDROCHLORIDE 2.5 MG/1
10 TABLET ORAL EVERY 8 HOURS
Refills: 0 | Status: DISCONTINUED | OUTPATIENT
Start: 2023-05-07 | End: 2023-05-08

## 2023-05-07 RX ORDER — ALBUMIN HUMAN 25 %
250 VIAL (ML) INTRAVENOUS ONCE
Refills: 0 | Status: COMPLETED | OUTPATIENT
Start: 2023-05-07 | End: 2023-05-07

## 2023-05-07 RX ORDER — CALCIUM GLUCONATE 100 MG/ML
2 VIAL (ML) INTRAVENOUS ONCE
Refills: 0 | Status: COMPLETED | OUTPATIENT
Start: 2023-05-07 | End: 2023-05-07

## 2023-05-07 RX ADMIN — Medication 20 MILLIGRAM(S): at 07:58

## 2023-05-07 RX ADMIN — OXYCODONE HYDROCHLORIDE 10 MILLIGRAM(S): 5 TABLET ORAL at 18:37

## 2023-05-07 RX ADMIN — OXYCODONE HYDROCHLORIDE 10 MILLIGRAM(S): 5 TABLET ORAL at 11:40

## 2023-05-07 RX ADMIN — OXYCODONE HYDROCHLORIDE 5 MILLIGRAM(S): 5 TABLET ORAL at 00:27

## 2023-05-07 RX ADMIN — LACOSAMIDE 120 MILLIGRAM(S): 50 TABLET ORAL at 06:37

## 2023-05-07 RX ADMIN — OXYCODONE HYDROCHLORIDE 10 MILLIGRAM(S): 5 TABLET ORAL at 11:07

## 2023-05-07 RX ADMIN — ATORVASTATIN CALCIUM 20 MILLIGRAM(S): 80 TABLET, FILM COATED ORAL at 21:59

## 2023-05-07 RX ADMIN — MIDODRINE HYDROCHLORIDE 10 MILLIGRAM(S): 2.5 TABLET ORAL at 16:48

## 2023-05-07 RX ADMIN — OXYCODONE HYDROCHLORIDE 10 MILLIGRAM(S): 5 TABLET ORAL at 19:19

## 2023-05-07 RX ADMIN — Medication 20 MILLIGRAM(S): at 01:08

## 2023-05-07 RX ADMIN — Medication 60 MILLIGRAM(S): at 05:18

## 2023-05-07 RX ADMIN — Medication 60 MILLIGRAM(S): at 18:37

## 2023-05-07 RX ADMIN — Medication 125 MILLILITER(S): at 07:56

## 2023-05-07 RX ADMIN — PANTOPRAZOLE SODIUM 40 MILLIGRAM(S): 20 TABLET, DELAYED RELEASE ORAL at 06:48

## 2023-05-07 RX ADMIN — OXYCODONE HYDROCHLORIDE 10 MILLIGRAM(S): 5 TABLET ORAL at 06:00

## 2023-05-07 RX ADMIN — Medication 125 MILLILITER(S): at 14:46

## 2023-05-07 RX ADMIN — LACOSAMIDE 120 MILLIGRAM(S): 50 TABLET ORAL at 17:04

## 2023-05-07 RX ADMIN — Medication 200 GRAM(S): at 15:14

## 2023-05-07 RX ADMIN — Medication 100 MILLIGRAM(S): at 05:32

## 2023-05-07 RX ADMIN — OXYCODONE HYDROCHLORIDE 5 MILLIGRAM(S): 5 TABLET ORAL at 01:00

## 2023-05-07 RX ADMIN — MIDODRINE HYDROCHLORIDE 10 MILLIGRAM(S): 2.5 TABLET ORAL at 09:24

## 2023-05-07 RX ADMIN — OXYCODONE HYDROCHLORIDE 10 MILLIGRAM(S): 5 TABLET ORAL at 05:13

## 2023-05-07 NOTE — PROGRESS NOTE ADULT - SUBJECTIVE AND OBJECTIVE BOX
CTICU  CRITICAL  CARE  attending     Hand off received 					   Pertinent clinical, laboratory, radiographic, hemodynamic, echocardiographic, respiratory data, microbiologic data and chart were reviewed and analyzed frequently throughout the course of the day and night      52 year old Male with HTN, type A aortic dissection s/p Dacron grafts, AV resuspension in 2013, CAD s/p CABG x 1 SVG to RCA (5/2013 with Dr. Medina), seizure disorder (last episode on 7/4/22)   He is a current every day marijuana user.   He was admitted for surgical management of progression of aneurysmal disease.   On 3/20/23 the patient underwent replacement of transverse aortic arch, second stage thoracic endovascular aortic repair, aorto-axillary bypass, AV replacement (bio 23mm),CABG x 1 (SVG-RCA).   Postoperative course complicated by lactic acidosis, severe cardiogenic and vasogenic shock, TREY requiring CVVHD and temporary dialysis requirement. Eventually discharged 4/14/23.   The patient presented to [facility] Emergency Room 4/27 for syncope vs seizure episode at home, falling backwards on the head.   Neurological workup performed during that visit revealed a negative EEG, but given clinical picture of seizures, he was started on vimpat and depakote.   Imaging preformed during that admission did no necessitate acute surgical intervention on endoleak.   He was readmitted to [facility] complaining of worsening epigastric pain.   CTAP revealed continued endoleak.   He was transferred to [facility] for further evaluation.    S/P Second stage TEVAR.       FAMILY HISTORY:  No pertinent family history in first degree relatives    PAST MEDICAL & SURGICAL HISTORY:  HTN (hypertension)  Aortic dissection  CAD (coronary artery disease)  Seizure disorder  S/P aortic bifurcation bypass graft  S/P CABG x 1        14 system review was unremarkable    Vital signs, hemodynamic and respiratory parameters were reviewed from the bedside nursing flow sheet.  ICU Vital Signs Last 24 Hrs  T(C): 37 (07 May 2023 17:06), Max: 37.1 (07 May 2023 05:35)  T(F): 98.6 (07 May 2023 17:06), Max: 98.7 (07 May 2023 05:35)  HR: 98 (07 May 2023 21:00) (69 - 102)  BP: --  BP(mean): --  ABP: 135/60 (07 May 2023 21:00) (128/64 - 167/75)  ABP(mean): 86 (07 May 2023 21:00) (83 - 106)  RR: 18 (07 May 2023 21:00) (16 - 18)  SpO2: 100% (07 May 2023 21:00) (100% - 100%)    O2 Parameters below as of 07 May 2023 22:00  Patient On (Oxygen Delivery Method): room air          Adult Advanced Hemodynamics Last 24 Hrs  CVP(mm Hg): --  CVP(cm H2O): --  CO: --  CI: --  PA: --  PA(mean): --  PCWP: --  SVR: --  SVRI: --  PVR: --  PVRI: --, ABG - ( 07 May 2023 14:29 )  pH, Arterial: 7.40  pH, Blood: x     /  pCO2: 38    /  pO2: 68    / HCO3: 24    / Base Excess: -1.1  /  SaO2: 95.4                Intake and output was reviewed and the fluid balance was calculated  Daily     Daily   I&O's Summary    06 May 2023 07:01  -  07 May 2023 07:00  --------------------------------------------------------  IN: 1901.8 mL / OUT: 1798 mL / NET: 103.8 mL    07 May 2023 07:01  -  07 May 2023 21:46  --------------------------------------------------------  IN: 1157 mL / OUT: 610 mL / NET: 547 mL        All lines and drain sites were assessed    Neuro: Ambulated today with lumbar drain capped without any problem.  Neck: No JVD.  CVS: S1, S2, No S3.  Lungs: Good air entry bilaterally.  Abd: Soft. No tenderness. + Bowel sounds.  Vascular: + DP/PT.  Extremities: No edema.  Lymphatic: Normal.  Skin: No abnormalities.      labs  CBC Full  -  ( 07 May 2023 14:16 )  WBC Count : 18.89 K/uL  RBC Count : 3.51 M/uL  Hemoglobin : 10.3 g/dL  Hematocrit : 31.0 %  Platelet Count - Automated : 228 K/uL  Mean Cell Volume : 88.3 fl  Mean Cell Hemoglobin : 29.3 pg  Mean Cell Hemoglobin Concentration : 33.2 gm/dL  Auto Neutrophil # : x  Auto Lymphocyte # : x  Auto Monocyte # : x  Auto Eosinophil # : x  Auto Basophil # : x  Auto Neutrophil % : x  Auto Lymphocyte % : x  Auto Monocyte % : x  Auto Eosinophil % : x  Auto Basophil % : x    05-07    137  |  102  |  22  ----------------------------<  136<H>  4.1   |  25  |  1.40<H>    Ca    8.4      07 May 2023 14:16  Phos  4.5     05-07  Mg     2.1     05-07    TPro  6.2  /  Alb  2.8<L>  /  TBili  0.3  /  DBili  x   /  AST  10  /  ALT  <5<L>  /  AlkPhos  77  05-07    PT/INR - ( 07 May 2023 14:16 )   PT: 18.4 sec;   INR: 1.54          PTT - ( 07 May 2023 14:16 )  PTT:86.1 sec  The current medications were reviewed   MEDICATIONS  (STANDING):  atorvastatin 20 milliGRAM(s) Oral at bedtime  chlorhexidine 2% Cloths 1 Application(s) Topical daily  dextrose 5%. 1000 milliLiter(s) (100 mL/Hr) IV Continuous <Continuous>  dextrose 5%. 1000 milliLiter(s) (50 mL/Hr) IV Continuous <Continuous>  dextrose 50% Injectable 25 Gram(s) IV Push once  dextrose 50% Injectable 12.5 Gram(s) IV Push once  dextrose 50% Injectable 25 Gram(s) IV Push once  glucagon  Injectable 1 milliGRAM(s) IntraMuscular once  insulin lispro (ADMELOG) corrective regimen sliding scale   SubCutaneous three times a day before meals  lacosamide IVPB 100 milliGRAM(s) IV Intermittent every 12 hours  midodrine 10 milliGRAM(s) Oral every 8 hours  pantoprazole    Tablet 40 milliGRAM(s) Oral before breakfast  polyethylene glycol 3350 17 Gram(s) Oral daily  senna 2 Tablet(s) Oral at bedtime  sodium chloride 0.9%. 1000 milliLiter(s) (10 mL/Hr) IV Continuous <Continuous>  valproate sodium  IVPB 1000 milliGRAM(s) IV Intermittent every 12 hours    MEDICATIONS  (PRN):  acetaminophen     Tablet .. 650 milliGRAM(s) Oral every 6 hours PRN Mild Pain (1 - 3)  dextrose Oral Gel 15 Gram(s) Oral once PRN Blood Glucose LESS THAN 70 milliGRAM(s)/deciliter  oxyCODONE    IR 5 milliGRAM(s) Oral every 6 hours PRN Moderate Pain (4 - 6)  oxyCODONE    IR 10 milliGRAM(s) Oral every 6 hours PRN Severe Pain (7 - 10)            53 year old  Male admitted with abdominal pain due to endoleak.  S/P Second Stage TEVAR.  Hemodynamically stable.  Good oxygenation.  Fair urine out put.        My plan includes :  Keep MAP .  Continue draining CSF at 10 cc per hour tonight.   D/C lumbar drain in AM.  Statin and Betablocker.  Antiseizure Rx.  Close hemodynamic  monitoring  Monitor for arrhythmias and monitor parameters for organ perfusion  Monitor neurologic status  Monitor renal function.  Head of the bed should remain elevated to 45 deg .   Chest PT and IS will be encouraged  Monitor adequacy of oxygenation   Nutritional goals will be met using po eventually , ensure adequate caloric intake and monitor the same  Stress ulcer and VTE prophylaxis will be achieved    Glycemic control is satisfactory  Electrolytes have been repleted as necessary and wound care has been carried out. Pain control has been achieved.   Aggressive physical therapy and early mobility and ambulation goals will be met   The family was updated about the course and plan  CRITICAL CARE TIME SPENT in evaluation and management, review and interpretation of labs and x-rays, hemodynamic management, formulating a plan and coordinating care: ___30____ MIN.  Time does not include procedural time.  CTICU ATTENDING     					    José Miguel Torres MD

## 2023-05-07 NOTE — PHYSICAL THERAPY INITIAL EVALUATION ADULT - PERTINENT HX OF CURRENT PROBLEM, REHAB EVAL
51 YO Male who was admitted for surgical mgmt of progression of aneurysmal disease. Recent admission 3/20-4/14 during which patient underwent replacement of transverse aortic arch, second stage thoracic endovascular aortic repair, aorto-axillary bypass, AV replacement (bio 23mm), and CABG x 1 (SVG-RCA) EF 75% (Ignacio, 3/20). Post op cb lactic acidosis, severe cardiogenic and vasogenic shock, TREY requiring CVVHD and temporary dialysis requirement. Patient presented to [facility] ED 4/27 for syncope vs seizure episode at home, falling onto back of head. Neurological workup preformed during that visit revealed a negative EEG, but given clinical picture of seizures, pt started on vimpat and depakote. Imaging preformed during that admission did no require acute surgical intervention on endoleak.

## 2023-05-07 NOTE — PHYSICAL THERAPY INITIAL EVALUATION ADULT - GENERAL OBSERVATIONS, REHAB EVAL
Pt received semi-supine in bed in no acute distress, cleared for OOB by Dr. Martinez with lumbar drain capped +EKG +Heplock +Cantrell +SCDs +lumbar drain capped +kalpana +IV

## 2023-05-07 NOTE — PROGRESS NOTE ADULT - SUBJECTIVE AND OBJECTIVE BOX
CTICU  CRITICAL  CARE  attending     Hand off received 					   Pertinent clinical, laboratory, radiographic, hemodynamic, echocardiographic, respiratory data, microbiologic data and chart were reviewed and analyzed frequently throughout the course of the day and night  Patient seen and examined with CTS/ SH attending at bedside    Pt is a 53y , Male, post op day # 2 s/p 2nd stage TEVAR ( had total arch replaced 2 months ago);     post procedure:    remains extubated   mild weakness of proximal LE strength; L>R; resolved  Vasopressin to maintain MAP >90  s/p 1 unit of PRBC yesterday    today:    Lumbar drain clamped  No LE motor deficits  low dose vasopressin  MAP goals liberalized to >80    53 YO Male, current every day marijuana use, w/ PMHx of HTN, type A aortic dissection s/p Dacron grafts, AV resuspension in 2013, CAD s/p CABG x 1 SVG to RCA (5/2013 with Dr. Medina), seizure disorder (last episode on 7/4/22) who was admitted for surgical mgmt of progression of aneurysmal disease. Recent admission 3/20-4/14 during which patient underwent replacement of transverse aortic arch, second stage thoracic endovascular aortic repair, aorto-axillary bypass, AV replacement (bio 23mm), and CABG x 1 (SVG-RCA) EF 75% (Ignacio, 3/20). Post op cb lactic acidosis, severe cardiogenic and vasogenic shock, TREY requiring CVVHD and temporary dialysis requirement. Patient presented to [facility] ED 4/27 for syncope vs seizure episode at home, falling onto back of head. Nuerological workup proformed during that visit revealed a negative EEG, but given clinical picture of seizures, pt started on vimpat and depakote. Imaging preformed during that admission did no require acute surgical intervention on endoleak.   Pt presented to [facility] ED last night complaning of worsening epigastric pain. CTAP revealed continued endoleak. Sent to [facility] for further evaluation. Pt will be taken to the OR with Dr. Pierre this morning for a completion TEVAR. Pt consented, OR aware.       , FAMILY HISTORY:  No pertinent family history in first degree relatives    PAST MEDICAL & SURGICAL HISTORY:  HTN (hypertension)      Aortic dissection      CAD (coronary artery disease)      Seizure disorder      S/P aortic bifurcation bypass graft      S/P CABG x 1        Patient is a 53y old  Male who presents with a chief complaint of endoleak, abdominal pain (06 May 2023 23:43)      14 system review limited 2/2 post procedure discomfort    Vital signs, hemodynamic and respiratory parameters were reviewed from the bedside nursing flowsheet.  ICU Vital Signs Last 24 Hrs  T(C): 37 (07 May 2023 17:06), Max: 37.1 (07 May 2023 05:35)  T(F): 98.6 (07 May 2023 17:06), Max: 98.7 (07 May 2023 05:35)  HR: 97 (07 May 2023 17:00) (69 - 97)  BP: --  BP(mean): --  ABP: 132/61 (07 May 2023 17:00) (128/64 - 174/74)  ABP(mean): 84 (07 May 2023 17:00) (83 - 107)  RR: 16 (07 May 2023 17:00) (16 - 18)  SpO2: 100% (07 May 2023 17:00) (100% - 100%)    O2 Parameters below as of 07 May 2023 17:00  Patient On (Oxygen Delivery Method): room air          Adult Advanced Hemodynamics Last 24 Hrs  CVP(mm Hg): --  CVP(cm H2O): --  CO: --  CI: --  PA: --  PA(mean): --  PCWP: --  SVR: --  SVRI: --  PVR: --  PVRI: --, ABG - ( 07 May 2023 14:29 )  pH, Arterial: 7.40  pH, Blood: x     /  pCO2: 38    /  pO2: 68    / HCO3: 24    / Base Excess: -1.1  /  SaO2: 95.4                Intake and output was reviewed and the fluid balance was calculated  Daily     Daily   I&O's Summary    06 May 2023 07:01  -  07 May 2023 07:00  --------------------------------------------------------  IN: 1901.8 mL / OUT: 1798 mL / NET: 103.8 mL    07 May 2023 07:01  -  07 May 2023 17:40  --------------------------------------------------------  IN: 997 mL / OUT: 540 mL / NET: 457 mL        All lines and drain sites were assessed  [facility]      POCT Blood Glucose.: 115 mg/dL (07 May 2023 16:51)    No acute change in mental status  Auscultation of the chest reveals equal bs  Abdomen is soft  Extremities are warm and well perfused  Wounds appear clean and unremarkable  Antibiotics are periop    labs  CBC Full  -  ( 07 May 2023 14:16 )  WBC Count : 18.89 K/uL  RBC Count : 3.51 M/uL  Hemoglobin : 10.3 g/dL  Hematocrit : 31.0 %  Platelet Count - Automated : 228 K/uL  Mean Cell Volume : 88.3 fl  Mean Cell Hemoglobin : 29.3 pg  Mean Cell Hemoglobin Concentration : 33.2 gm/dL  Auto Neutrophil # : x  Auto Lymphocyte # : x  Auto Monocyte # : x  Auto Eosinophil # : x  Auto Basophil # : x  Auto Neutrophil % : x  Auto Lymphocyte % : x  Auto Monocyte % : x  Auto Eosinophil % : x  Auto Basophil % : x    05-07    137  |  102  |  22  ----------------------------<  136<H>  4.1   |  25  |  1.40<H>    Ca    8.4      07 May 2023 14:16  Phos  4.5     05-07  Mg     2.1     05-07    TPro  6.2  /  Alb  2.8<L>  /  TBili  0.3  /  DBili  x   /  AST  10  /  ALT  <5<L>  /  AlkPhos  77  05-07    PT/INR - ( 07 May 2023 14:16 )   PT: 18.4 sec;   INR: 1.54          PTT - ( 07 May 2023 14:16 )  PTT:86.1 sec  The current medications were reviewed   MEDICATIONS  (STANDING):  atorvastatin 20 milliGRAM(s) Oral at bedtime  chlorhexidine 2% Cloths 1 Application(s) Topical daily  dexMEDEtomidine Infusion 0.5 MICROgram(s)/kG/Hr (8.75 mL/Hr) IV Continuous <Continuous>  dextrose 5%. 1000 milliLiter(s) (100 mL/Hr) IV Continuous <Continuous>  dextrose 5%. 1000 milliLiter(s) (50 mL/Hr) IV Continuous <Continuous>  dextrose 50% Injectable 25 Gram(s) IV Push once  dextrose 50% Injectable 12.5 Gram(s) IV Push once  dextrose 50% Injectable 25 Gram(s) IV Push once  glucagon  Injectable 1 milliGRAM(s) IntraMuscular once  insulin lispro (ADMELOG) corrective regimen sliding scale   SubCutaneous three times a day before meals  lacosamide IVPB 100 milliGRAM(s) IV Intermittent every 12 hours  midodrine 10 milliGRAM(s) Oral every 8 hours  pantoprazole    Tablet 40 milliGRAM(s) Oral before breakfast  polyethylene glycol 3350 17 Gram(s) Oral daily  senna 2 Tablet(s) Oral at bedtime  sodium chloride 0.9%. 1000 milliLiter(s) (10 mL/Hr) IV Continuous <Continuous>  valproate sodium  IVPB 1000 milliGRAM(s) IV Intermittent every 12 hours  vasopressin Infusion 0.02 Unit(s)/Min (3 mL/Hr) IV Continuous <Continuous>    MEDICATIONS  (PRN):  acetaminophen     Tablet .. 650 milliGRAM(s) Oral every 6 hours PRN Mild Pain (1 - 3)  dextrose Oral Gel 15 Gram(s) Oral once PRN Blood Glucose LESS THAN 70 milliGRAM(s)/deciliter  oxyCODONE    IR 10 milliGRAM(s) Oral every 6 hours PRN Severe Pain (7 - 10)  oxyCODONE    IR 5 milliGRAM(s) Oral every 6 hours PRN Moderate Pain (4 - 6)       PROBLEM LIST/ ASSESSMENT:  HEALTH ISSUES - PROBLEM Dx:      ,   Patient is a 53y old  Male who presents with a chief complaint of endoleak, abdominal pain (06 May 2023 23:43)     s/p TEVAR      My plan includes :    Maintain MAP >90  Maintain ICP <12  Leave LD clamped during the day  Resume drainage @ 10 cc/hr overnight  Clamp and possible d/c lumbar drain tomorrow am.  supplemental O2 as needed  Transfuse to maintain Hct around 30 ( to optimize oxygen carrying capacity : to minimize SC ischemia if any)  volume resuscitate as needed    close hemodynamic, ventilatory and drain monitoring and management per post op routine    Monitor for arrhythmias and monitor parameters for organ perfusion  monitor neurologic status  Head of the bed should remain elevated to 45 deg .   chest PT and IS will be encouraged  monitor adequacy of oxygenation and ventilation and attempt to wean oxygen  Nutritional goals will be met using po eventually , ensure adequate caloric intake and montior the same  Stress ulcer and VTE prophylaxis will be achieved    Glycemic control is satisfactory  Electrolytes have been repleted as necessary and wound care has been carried out. Pain control has been achieved.   agressive physical therapy and early mobility and ambulation goals will be met   The family was updated about the course and plan  CRITICAL CARE TIME SPENT in evaluation and management, reassessments, review and interpretation of labs and x-rays, ventilator and hemodynamic management, formulating a plan and coordinating care: ___90____ MIN.  Time does not include procedural time.  CTICU ATTENDING     					    Dalton Martinez MD

## 2023-05-07 NOTE — PHYSICAL THERAPY INITIAL EVALUATION ADULT - ADDITIONAL COMMENTS
As per pt and wife at bedside providing history, pt was mostly independent prior to arrival after returning home from previous hospital stay. Pt had only 1 session of home PT. Uses no AD in the home, RW in the community. Has a shower tub.

## 2023-05-07 NOTE — PHYSICAL THERAPY INITIAL EVALUATION ADULT - GAIT DEVIATIONS NOTED, PT EVAL
Guarded gait expressing fear of pain from Cantrell t/o/decreased bridgette/decreased velocity of limb motion/decreased step length/decreased weight-shifting ability

## 2023-05-08 LAB
ALBUMIN SERPL ELPH-MCNC: 2.5 G/DL — LOW (ref 3.3–5)
ALP SERPL-CCNC: 71 U/L — SIGNIFICANT CHANGE UP (ref 40–120)
ALT FLD-CCNC: <5 U/L — LOW (ref 10–45)
AMYLASE P1 CFR SERPL: 84 U/L — SIGNIFICANT CHANGE UP (ref 25–125)
ANION GAP SERPL CALC-SCNC: 9 MMOL/L — SIGNIFICANT CHANGE UP (ref 5–17)
APTT BLD: 36.9 SEC — HIGH (ref 27.5–35.5)
AST SERPL-CCNC: 8 U/L — LOW (ref 10–40)
BILIRUB SERPL-MCNC: 0.3 MG/DL — SIGNIFICANT CHANGE UP (ref 0.2–1.2)
BLD GP AB SCN SERPL QL: NEGATIVE — SIGNIFICANT CHANGE UP
BUN SERPL-MCNC: 22 MG/DL — SIGNIFICANT CHANGE UP (ref 7–23)
CALCIUM SERPL-MCNC: 8.3 MG/DL — LOW (ref 8.4–10.5)
CHLORIDE SERPL-SCNC: 101 MMOL/L — SIGNIFICANT CHANGE UP (ref 96–108)
CO2 SERPL-SCNC: 25 MMOL/L — SIGNIFICANT CHANGE UP (ref 22–31)
CREAT SERPL-MCNC: 1.22 MG/DL — SIGNIFICANT CHANGE UP (ref 0.5–1.3)
EGFR: 70 ML/MIN/1.73M2 — SIGNIFICANT CHANGE UP
GAS PNL BLDA: SIGNIFICANT CHANGE UP
GLUCOSE BLDC GLUCOMTR-MCNC: 144 MG/DL — HIGH (ref 70–99)
GLUCOSE BLDC GLUCOMTR-MCNC: 82 MG/DL — SIGNIFICANT CHANGE UP (ref 70–99)
GLUCOSE BLDC GLUCOMTR-MCNC: 86 MG/DL — SIGNIFICANT CHANGE UP (ref 70–99)
GLUCOSE BLDC GLUCOMTR-MCNC: 97 MG/DL — SIGNIFICANT CHANGE UP (ref 70–99)
GLUCOSE SERPL-MCNC: 83 MG/DL — SIGNIFICANT CHANGE UP (ref 70–99)
HCT VFR BLD CALC: 30.2 % — LOW (ref 39–50)
HGB BLD-MCNC: 10 G/DL — LOW (ref 13–17)
INR BLD: 1.51 — HIGH (ref 0.88–1.16)
LIDOCAIN IGE QN: 95 U/L — HIGH (ref 7–60)
MAGNESIUM SERPL-MCNC: 2 MG/DL — SIGNIFICANT CHANGE UP (ref 1.6–2.6)
MCHC RBC-ENTMCNC: 29.5 PG — SIGNIFICANT CHANGE UP (ref 27–34)
MCHC RBC-ENTMCNC: 33.1 GM/DL — SIGNIFICANT CHANGE UP (ref 32–36)
MCV RBC AUTO: 89.1 FL — SIGNIFICANT CHANGE UP (ref 80–100)
NRBC # BLD: 0 /100 WBCS — SIGNIFICANT CHANGE UP (ref 0–0)
PHOSPHATE SERPL-MCNC: 3.7 MG/DL — SIGNIFICANT CHANGE UP (ref 2.5–4.5)
PLATELET # BLD AUTO: 210 K/UL — SIGNIFICANT CHANGE UP (ref 150–400)
POTASSIUM SERPL-MCNC: 3.9 MMOL/L — SIGNIFICANT CHANGE UP (ref 3.5–5.3)
POTASSIUM SERPL-SCNC: 3.9 MMOL/L — SIGNIFICANT CHANGE UP (ref 3.5–5.3)
PROT SERPL-MCNC: 5.6 G/DL — LOW (ref 6–8.3)
PROTHROM AB SERPL-ACNC: 18.1 SEC — HIGH (ref 10.5–13.4)
RBC # BLD: 3.39 M/UL — LOW (ref 4.2–5.8)
RBC # FLD: 15.9 % — HIGH (ref 10.3–14.5)
RH IG SCN BLD-IMP: POSITIVE — SIGNIFICANT CHANGE UP
SODIUM SERPL-SCNC: 135 MMOL/L — SIGNIFICANT CHANGE UP (ref 135–145)
WBC # BLD: 19.15 K/UL — HIGH (ref 3.8–10.5)
WBC # FLD AUTO: 19.15 K/UL — HIGH (ref 3.8–10.5)

## 2023-05-08 RX ORDER — POTASSIUM CHLORIDE 20 MEQ
20 PACKET (EA) ORAL ONCE
Refills: 0 | Status: DISCONTINUED | OUTPATIENT
Start: 2023-05-08 | End: 2023-05-08

## 2023-05-08 RX ORDER — PIPERACILLIN AND TAZOBACTAM 4; .5 G/20ML; G/20ML
3.38 INJECTION, POWDER, LYOPHILIZED, FOR SOLUTION INTRAVENOUS ONCE
Refills: 0 | Status: COMPLETED | OUTPATIENT
Start: 2023-05-08 | End: 2023-05-08

## 2023-05-08 RX ORDER — METOCLOPRAMIDE HCL 10 MG
10 TABLET ORAL ONCE
Refills: 0 | Status: COMPLETED | OUTPATIENT
Start: 2023-05-08 | End: 2023-05-08

## 2023-05-08 RX ORDER — POTASSIUM CHLORIDE 20 MEQ
20 PACKET (EA) ORAL ONCE
Refills: 0 | Status: COMPLETED | OUTPATIENT
Start: 2023-05-08 | End: 2023-05-08

## 2023-05-08 RX ORDER — PIPERACILLIN AND TAZOBACTAM 4; .5 G/20ML; G/20ML
3.38 INJECTION, POWDER, LYOPHILIZED, FOR SOLUTION INTRAVENOUS EVERY 8 HOURS
Refills: 0 | Status: DISCONTINUED | OUTPATIENT
Start: 2023-05-09 | End: 2023-05-10

## 2023-05-08 RX ORDER — MAGNESIUM SULFATE 500 MG/ML
2 VIAL (ML) INJECTION ONCE
Refills: 0 | Status: COMPLETED | OUTPATIENT
Start: 2023-05-08 | End: 2023-05-08

## 2023-05-08 RX ORDER — MIDODRINE HYDROCHLORIDE 2.5 MG/1
5 TABLET ORAL EVERY 8 HOURS
Refills: 0 | Status: DISCONTINUED | OUTPATIENT
Start: 2023-05-09 | End: 2023-05-10

## 2023-05-08 RX ADMIN — Medication 10 MILLIGRAM(S): at 05:14

## 2023-05-08 RX ADMIN — PIPERACILLIN AND TAZOBACTAM 200 GRAM(S): 4; .5 INJECTION, POWDER, LYOPHILIZED, FOR SOLUTION INTRAVENOUS at 19:45

## 2023-05-08 RX ADMIN — LACOSAMIDE 120 MILLIGRAM(S): 50 TABLET ORAL at 06:29

## 2023-05-08 RX ADMIN — OXYCODONE HYDROCHLORIDE 10 MILLIGRAM(S): 5 TABLET ORAL at 13:16

## 2023-05-08 RX ADMIN — LACOSAMIDE 120 MILLIGRAM(S): 50 TABLET ORAL at 18:24

## 2023-05-08 RX ADMIN — Medication 650 MILLIGRAM(S): at 20:31

## 2023-05-08 RX ADMIN — OXYCODONE HYDROCHLORIDE 10 MILLIGRAM(S): 5 TABLET ORAL at 22:50

## 2023-05-08 RX ADMIN — Medication 60 MILLIGRAM(S): at 17:13

## 2023-05-08 RX ADMIN — OXYCODONE HYDROCHLORIDE 10 MILLIGRAM(S): 5 TABLET ORAL at 17:40

## 2023-05-08 RX ADMIN — SENNA PLUS 2 TABLET(S): 8.6 TABLET ORAL at 21:23

## 2023-05-08 RX ADMIN — Medication 650 MILLIGRAM(S): at 21:30

## 2023-05-08 RX ADMIN — CHLORHEXIDINE GLUCONATE 1 APPLICATION(S): 213 SOLUTION TOPICAL at 06:32

## 2023-05-08 RX ADMIN — MIDODRINE HYDROCHLORIDE 10 MILLIGRAM(S): 2.5 TABLET ORAL at 17:01

## 2023-05-08 RX ADMIN — OXYCODONE HYDROCHLORIDE 10 MILLIGRAM(S): 5 TABLET ORAL at 06:50

## 2023-05-08 RX ADMIN — Medication 60 MILLIGRAM(S): at 06:32

## 2023-05-08 RX ADMIN — OXYCODONE HYDROCHLORIDE 10 MILLIGRAM(S): 5 TABLET ORAL at 07:50

## 2023-05-08 RX ADMIN — PANTOPRAZOLE SODIUM 40 MILLIGRAM(S): 20 TABLET, DELAYED RELEASE ORAL at 06:32

## 2023-05-08 RX ADMIN — Medication 20 MILLIEQUIVALENT(S): at 05:14

## 2023-05-08 RX ADMIN — Medication 25 GRAM(S): at 05:14

## 2023-05-08 RX ADMIN — MIDODRINE HYDROCHLORIDE 10 MILLIGRAM(S): 2.5 TABLET ORAL at 10:18

## 2023-05-08 RX ADMIN — OXYCODONE HYDROCHLORIDE 10 MILLIGRAM(S): 5 TABLET ORAL at 21:51

## 2023-05-08 RX ADMIN — MIDODRINE HYDROCHLORIDE 10 MILLIGRAM(S): 2.5 TABLET ORAL at 00:08

## 2023-05-08 RX ADMIN — PIPERACILLIN AND TAZOBACTAM 25 GRAM(S): 4; .5 INJECTION, POWDER, LYOPHILIZED, FOR SOLUTION INTRAVENOUS at 23:10

## 2023-05-08 RX ADMIN — ATORVASTATIN CALCIUM 20 MILLIGRAM(S): 80 TABLET, FILM COATED ORAL at 21:23

## 2023-05-08 NOTE — PROGRESS NOTE ADULT - SUBJECTIVE AND OBJECTIVE BOX
HPI:  51 YO Male, current every day marijuana use, w/ PMHx of HTN, type A aortic dissection s/p Dacron grafts, AV resuspension in 2013, CAD s/p CABG x 1 SVG to RCA (5/2013 with Dr. Medina), seizure disorder (last episode on 7/4/22) who was admitted for surgical mgmt of progression of aneurysmal disease. Recent admission 3/20-4/14 during which patient underwent replacement of transverse aortic arch, second stage thoracic endovascular aortic repair, aorto-axillary bypass, AV replacement (bio 23mm), and CABG x 1 (SVG-RCA) EF 75% (Ignacio, 3/20). Post op cb lactic acidosis, severe cardiogenic and vasogenic shock, TREY requiring CVVHD and temporary dialysis requirement. Patient presented to [facility] ED 4/27 for syncope vs seizure episode at home, falling onto back of head. Nuerological workup proformed during that visit revealed a negative EEG, but given clinical picture of seizures, pt started on vimpat and depakote. Imaging preformed during that admission did no require acute surgical intervention on endoleak.   Pt presented to [facility] ED last night complaning of worsening epigastric pain. CTAP revealed continued endoleak. Sent to [facility] for further evaluation. Pt will be taken to the OR with Dr. Pierre this morning for a completion TEVAR. Pt consented, OR aware.         (05 May 2023 09:41)    OVERNIGHT EVENTS: POD 3 TEVAR with CTSx.    Vital Signs Last 24 Hrs  T(C): 36.4 (08 May 2023 09:12), Max: 37.1 (07 May 2023 22:18)  T(F): 97.6 (08 May 2023 09:12), Max: 98.8 (07 May 2023 22:18)  HR: 114 (08 May 2023 10:00) (75 - 120)  BP: --  BP(mean): --  RR: 18 (08 May 2023 10:00) (16 - 18)  SpO2: 91% (08 May 2023 10:00) (91% - 100%)    Parameters below as of 08 May 2023 10:00  Patient On (Oxygen Delivery Method): room air        I&O's Summary    07 May 2023 07:01  -  08 May 2023 07:00  --------------------------------------------------------  IN: 1387 mL / OUT: 1140 mL / NET: 247 mL    08 May 2023 07:01  -  08 May 2023 11:45  --------------------------------------------------------  IN: 240 mL / OUT: 0 mL / NET: 240 mL        PHYSICAL EXAM:  General: NAD, pt is comfortably sitting up in bed, on room air  HEENT: CN II-XII grossly intact, PERRL 3mm briskly reactive, EOMI b/l, face symmetric, tongue midline, neck FROM  Cardiovascular: RRR, normal S1 and S2   Respiratory: lungs CTAB, no wheezing, rhonchi, or crackles   GI: normoactive BS to auscultation, abd soft, NTND   Neuro: A&Ox3, No aphasia, speech clear, no dysmetria, no pronator drift. Follows commands.  GODINEZ x4 spontaneously, 5/5 strength in all extremities throughout. SILT throughout   Extremities: distal pulses 2+ x4  Wound/incision:  Drain:         TUBES/LINES:  [] Cantrell  [] Lumbar Drain  [] Wound Drains  [] Others      DIET:  [] NPO  [] Mechanical  [] Tube feeds    LABS:                        10.0   19.15 )-----------( 210      ( 08 May 2023 01:44 )             30.2     05-08    135  |  101  |  22  ----------------------------<  83  3.9   |  25  |  1.22    Ca    8.3<L>      08 May 2023 01:44  Phos  3.7     05-08  Mg     2.0     05-08    TPro  5.6<L>  /  Alb  2.5<L>  /  TBili  0.3  /  DBili  x   /  AST  8<L>  /  ALT  <5<L>  /  AlkPhos  71  05-08    PT/INR - ( 08 May 2023 01:44 )   PT: 18.1 sec;   INR: 1.51          PTT - ( 08 May 2023 01:44 )  PTT:36.9 sec        CAPILLARY BLOOD GLUCOSE      POCT Blood Glucose.: 86 mg/dL (08 May 2023 10:38)  POCT Blood Glucose.: 97 mg/dL (08 May 2023 06:56)  POCT Blood Glucose.: 115 mg/dL (07 May 2023 16:51)      Drug Levels: [] N/A    CSF Analysis: [] N/A      Allergies    No Known Allergies    Intolerances      MEDICATIONS:  Antibiotics:    Neuro:  acetaminophen     Tablet .. 650 milliGRAM(s) Oral every 6 hours PRN  lacosamide IVPB 100 milliGRAM(s) IV Intermittent every 12 hours  oxyCODONE    IR 5 milliGRAM(s) Oral every 6 hours PRN  oxyCODONE    IR 10 milliGRAM(s) Oral every 6 hours PRN  valproate sodium  IVPB 1000 milliGRAM(s) IV Intermittent every 12 hours    Anticoagulation:    OTHER:  atorvastatin 20 milliGRAM(s) Oral at bedtime  chlorhexidine 2% Cloths 1 Application(s) Topical daily  dextrose 50% Injectable 25 Gram(s) IV Push once  dextrose 50% Injectable 25 Gram(s) IV Push once  dextrose 50% Injectable 12.5 Gram(s) IV Push once  dextrose Oral Gel 15 Gram(s) Oral once PRN  glucagon  Injectable 1 milliGRAM(s) IntraMuscular once  insulin lispro (ADMELOG) corrective regimen sliding scale   SubCutaneous three times a day before meals  midodrine 10 milliGRAM(s) Oral every 8 hours  pantoprazole    Tablet 40 milliGRAM(s) Oral before breakfast  polyethylene glycol 3350 17 Gram(s) Oral daily  senna 2 Tablet(s) Oral at bedtime    IVF:  dextrose 5%. 1000 milliLiter(s) IV Continuous <Continuous>  dextrose 5%. 1000 milliLiter(s) IV Continuous <Continuous>  sodium chloride 0.9%. 1000 milliLiter(s) IV Continuous <Continuous>    CULTURES:  Culture Results:   No growth at 5 days. (04-27 @ 15:06)  Culture Results:   No growth at 5 days. (04-27 @ 14:25)    RADIOLOGY & ADDITIONAL TESTS:      ASSESSMENT:  53y Male PMHx of HTN, type A aortic dissection s/p Dacron grafts, AV resuspension in 2013, CAD s/p CABG x 1 SVG to RCA (5/2013 with Dr. Medina), seizure disorder (last episode on 7/4/22) who was admitted for surgical mgmt of progression of aneurysmal disease, now s/p lumbar drain placement and TEVAR (5/5/23).    PLAN  - Lumbar drain patent and in place draining 10cc/hr  - Please notify NSGY when you would like removed.  - Care per CTSx    D/w Dr. Trotter    HPI:  53 YO Male, current every day marijuana use, w/ PMHx of HTN, type A aortic dissection s/p Dacron grafts, AV resuspension in 2013, CAD s/p CABG x 1 SVG to RCA (5/2013 with Dr. Medina), seizure disorder (last episode on 7/4/22) who was admitted for surgical mgmt of progression of aneurysmal disease. Recent admission 3/20-4/14 during which patient underwent replacement of transverse aortic arch, second stage thoracic endovascular aortic repair, aorto-axillary bypass, AV replacement (bio 23mm), and CABG x 1 (SVG-RCA) EF 75% (Ignacio, 3/20). Post op cb lactic acidosis, severe cardiogenic and vasogenic shock, TREY requiring CVVHD and temporary dialysis requirement. Patient presented to [facility] ED 4/27 for syncope vs seizure episode at home, falling onto back of head. Nuerological workup proformed during that visit revealed a negative EEG, but given clinical picture of seizures, pt started on vimpat and depakote. Imaging preformed during that admission did no require acute surgical intervention on endoleak.   Pt presented to [facility] ED last night complaning of worsening epigastric pain. CTAP revealed continued endoleak. Sent to [facility] for further evaluation. Pt will be taken to the OR with Dr. Pierre this morning for a completion TEVAR. Pt consented, OR aware.         (05 May 2023 09:41)    OVERNIGHT EVENTS: POD 3 TEVAR with CTSx. Lumbar drain has been clamped since 6AM today.     Vital Signs Last 24 Hrs  T(C): 36.4 (08 May 2023 09:12), Max: 37.1 (07 May 2023 22:18)  T(F): 97.6 (08 May 2023 09:12), Max: 98.8 (07 May 2023 22:18)  HR: 114 (08 May 2023 10:00) (75 - 120)  BP: --  BP(mean): --  RR: 18 (08 May 2023 10:00) (16 - 18)  SpO2: 91% (08 May 2023 10:00) (91% - 100%)    Parameters below as of 08 May 2023 10:00  Patient On (Oxygen Delivery Method): room air        I&O's Summary    07 May 2023 07:01  -  08 May 2023 07:00  --------------------------------------------------------  IN: 1387 mL / OUT: 1140 mL / NET: 247 mL    08 May 2023 07:01  -  08 May 2023 11:45  --------------------------------------------------------  IN: 240 mL / OUT: 0 mL / NET: 240 mL        PHYSICAL EXAM:  General: NAD, pt is comfortably sitting up in bed, on room air  HEENT: CN II-XII grossly intact, PERRL 3mm briskly reactive, EOMI b/l, face symmetric, tongue midline, neck FROM  Cardiovascular: RRR, normal S1 and S2   Respiratory: lungs CTAB, no wheezing, rhonchi, or crackles   GI: normoactive BS to auscultation, abd soft, NTND   Neuro: A&Ox2-3 (self, place, year not month), +mild perseveration. No aphasia, speech clear, no dysmetria, no pronator drift. Follows commands.  GODINEZ x4 spontaneously, 5/5 strength in all extremities throughout. SILT throughout   Extremities: warm and well perfused.  Drain: +Lumbar drain in place, tegaderm dressing mildly blood tinged.     TUBES/LINES:  [] Cantrell  [x] Lumbar Drain  [] Wound Drains  [] Others      LABS:                        10.0   19.15 )-----------( 210      ( 08 May 2023 01:44 )             30.2     05-08    135  |  101  |  22  ----------------------------<  83  3.9   |  25  |  1.22    Ca    8.3<L>      08 May 2023 01:44  Phos  3.7     05-08  Mg     2.0     05-08    TPro  5.6<L>  /  Alb  2.5<L>  /  TBili  0.3  /  DBili  x   /  AST  8<L>  /  ALT  <5<L>  /  AlkPhos  71  05-08    PT/INR - ( 08 May 2023 01:44 )   PT: 18.1 sec;   INR: 1.51          PTT - ( 08 May 2023 01:44 )  PTT:36.9 sec        CAPILLARY BLOOD GLUCOSE      POCT Blood Glucose.: 86 mg/dL (08 May 2023 10:38)  POCT Blood Glucose.: 97 mg/dL (08 May 2023 06:56)  POCT Blood Glucose.: 115 mg/dL (07 May 2023 16:51)      Drug Levels: [] N/A    CSF Analysis: [] N/A      Allergies    No Known Allergies    Intolerances      MEDICATIONS:  Antibiotics:    Neuro:  acetaminophen     Tablet .. 650 milliGRAM(s) Oral every 6 hours PRN  lacosamide IVPB 100 milliGRAM(s) IV Intermittent every 12 hours  oxyCODONE    IR 5 milliGRAM(s) Oral every 6 hours PRN  oxyCODONE    IR 10 milliGRAM(s) Oral every 6 hours PRN  valproate sodium  IVPB 1000 milliGRAM(s) IV Intermittent every 12 hours    Anticoagulation:    OTHER:  atorvastatin 20 milliGRAM(s) Oral at bedtime  chlorhexidine 2% Cloths 1 Application(s) Topical daily  dextrose 50% Injectable 25 Gram(s) IV Push once  dextrose 50% Injectable 25 Gram(s) IV Push once  dextrose 50% Injectable 12.5 Gram(s) IV Push once  dextrose Oral Gel 15 Gram(s) Oral once PRN  glucagon  Injectable 1 milliGRAM(s) IntraMuscular once  insulin lispro (ADMELOG) corrective regimen sliding scale   SubCutaneous three times a day before meals  midodrine 10 milliGRAM(s) Oral every 8 hours  pantoprazole    Tablet 40 milliGRAM(s) Oral before breakfast  polyethylene glycol 3350 17 Gram(s) Oral daily  senna 2 Tablet(s) Oral at bedtime    IVF:  dextrose 5%. 1000 milliLiter(s) IV Continuous <Continuous>  dextrose 5%. 1000 milliLiter(s) IV Continuous <Continuous>  sodium chloride 0.9%. 1000 milliLiter(s) IV Continuous <Continuous>    CULTURES:  Culture Results:   No growth at 5 days. (04-27 @ 15:06)  Culture Results:   No growth at 5 days. (04-27 @ 14:25)    RADIOLOGY & ADDITIONAL TESTS:      ASSESSMENT:  53y Male PMHx of HTN, type A aortic dissection s/p Dacron grafts, AV resuspension in 2013, CAD s/p CABG x 1 SVG to RCA (5/2013 with Dr. Medina), seizure disorder (last episode on 7/4/22) who was admitted for surgical mgmt of progression of aneurysmal disease, now s/p lumbar drain placement and TEVAR (5/5/23).    PLAN  - Lumbar drain has been clamped since 6AM  - Please notify NSGY when you would like drain d/c'ed.   - Care per CTSx  - Pain control PRN  - Notify NSGY of any acute neurological changes, can call floor phone at [phone].     D/w Dr. Trotter

## 2023-05-08 NOTE — CONSULT NOTE ADULT - SUBJECTIVE AND OBJECTIVE BOX
52yo Male pt with PMH HTN, type A aortic dissection s/p Dacron grafts, AV resuspension in 2013, CAD s/p CABG x 1 SVG to RCA (5/2013 with Dr. Medina), seizure disorder (last episode on 7/4/22) who was admitted for surgical mgmt of progression of aneurysmal disease. Recent admission 3/20-4/14 during which patient underwent replacement of transverse aortic arch, second stage thoracic endovascular aortic repair, aorto-axillary bypass, AV replacement (bio 23mm), and CABG x 1 (SVG-RCA) EF 75% (Ignacio, 3/20). Transferred from OSH to CT surgery on 5/5 for 2nd stage TEVAR. Now s/p 2nd state TEVAR on 5/5. General surgery consulted for finding of acute pancreatitis with large peripancreatic/perigastric fluid collections on CTA abdomen on 5/8 in the setting of increasing leukocytosis. On examination, pt states that prior to presenting to [facility] on 5/4, he had x1 day of severe upper abdominal pain associated with nausea which prompted him to present to the ED. States that by the time he got to the ED, his pain had resolved. States that he had never had similar pain in the past. Denies history of ETOH abuse, gallstones, prior ERCP, or thiazide use.     Denies family hx of IBS, Crohn's, UC, or colon cancer.    Surgery consulted for acute pancreatitis with peripancreatic/perigastric fluid collections    Pt afebrile, tachycardic to 110s, normotensive (on midodrine), and satting on RA. Labs significant for WBC 19.15 (10.2 on admission), Hb 10.0, TBili 0.3, AST 8, ALT <5, and Lipase 93 (1145 on admission). CTA abdomen/pelvis demonstrates diffuse pancreatic enlargement and heterogenous attenuation predominantly in body and tail, with multiple intrapancreatic and peripancreatic fluid collections with well-defined enhancing wall, new since November 30, 2022, not significantly changed since May 5, 2023 upon directed review. Largest fluid collection along stomach greater curvature 8.5 x 2 x 2 cm, communicates with inferior fluid collection measuring 5 x 3 x 3 cm. Main pancreatic duct at head/uncinate appears mildly dilated up to 5 mm and is poorly visualized at pancreatic neck body and tail.    PMH: HTN, type A aortic dissection, CAD, seizure disorder,   PSx: Dacron graft, CABG x1, SVG to RCA, transverse aortic arch repalcement, second stage TEVAR, aorto-axillary bypass, AV replacement  Social Hx: No tobacco, daily marijuana, minimal Etoh, no illicits  Family Hx: noncontributory    Meds:   acetaminophen 325 mg oral tablet: 2 tab(s) orally every 6 hour  divalproex sodium 500 mg oral tablet, extended release: 2 tab(s) orally every 12 hours    Physical Exam  General: AAOx3, NAD, laying comfortably in bed  Cardio: Sinus tachycardia  Pulm: Nonlabored breathing  Abdomen: soft, nondistended, nontender, no rebound, no guarding, surgical scars well healed  Extremities: WWP, peripheral pulses appreciated

## 2023-05-08 NOTE — PROGRESS NOTE ADULT - SUBJECTIVE AND OBJECTIVE BOX
INTERVAL COURSE  PD# 3  second stage TEVAR   Transient LE motor dysfx with neg imaging improved with higher MAPS and more CSF drainage-GODINEZ symmetrically nonfocal   LD removed 5/8  Worsening leukocytosis with T imaging suggestive of necrotizing pancreatitis currently on Zosyn/ gen surg called   Lipase 1120 on admission down to 95 today, Denies pain       VITALS  Vital Signs Last 24 Hrs  T(C): 37.1 (08 May 2023 18:17), Max: 37.1 (08 May 2023 01:29)  T(F): 98.7 (08 May 2023 18:17), Max: 98.8 (08 May 2023 13:58)  HR: 103 (08 May 2023 22:00) (89 - 125)  BP: 137/69 (08 May 2023 21:00) (137/69 - 137/69)  BP(mean): 95 (08 May 2023 21:00) (95 - 95)  RR: 20 (08 May 2023 22:00) (16 - 20)  SpO2: 91% (08 May 2023 22:00) (91% - 100%)    Parameters below as of 08 May 2023 22:00  Patient On (Oxygen Delivery Method): room air      PHYSICAL EXAM  General: A&Ox 3; NAD  Respiratory: CTA B/L  Cardiovascular: Regular rhythm/rate; Systolic murmur +  Gastrointestinal: Soft; NTND   Extremities: WWP; no edema  Neurological:  CNII-XII grossly intact; no obvious focal deficits      IMAGING/EKG/ETC  Reviewed.        INTERVAL COURSE  PD# 3  second stage TEVAR   Transient LE motor dysfx with neg imaging improved with higher MAPS and more CSF drainage-GODINEZ symmetrically nonfocal   LD removed 5/8  Worsening leukocytosis with T imaging suggestive of necrotizing pancreatitis currently on Zosyn/ gen surg called   Lipase 1120 on admission down to 95 today, Denies pain       VITALS  Vital Signs Last 24 Hrs  T(C): 37.1 (08 May 2023 18:17), Max: 37.1 (08 May 2023 01:29)  T(F): 98.7 (08 May 2023 18:17), Max: 98.8 (08 May 2023 13:58)  HR: 103 (08 May 2023 22:00) (89 - 125)  BP: 137/69 (08 May 2023 21:00) (137/69 - 137/69)  BP(mean): 95 (08 May 2023 21:00) (95 - 95)  RR: 20 (08 May 2023 22:00) (16 - 20)  SpO2: 91% (08 May 2023 22:00) (91% - 100%)    Parameters below as of 08 May 2023 22:00  Patient On (Oxygen Delivery Method): room air      PHYSICAL EXAM  General: A&Ox 3; NAD  Respiratory: CTA B/L  Cardiovascular: Regular rhythm/rate; Systolic murmur +  Gastrointestinal: Soft; NTND   Extremities: WWP; 2+ pitting edema b/l   Right groin seroma stable in size   Neurological:  CNII-XII grossly intact; no obvious focal deficits      IMAGING/EKG/ETC  Reviewed.

## 2023-05-08 NOTE — PROGRESS NOTE ADULT - SUBJECTIVE AND OBJECTIVE BOX
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 Recent admission 3/20-4/14 during which patient underwent replacement of transverse aortic arch, second stage thoracic endovascular aortic repair, aorto-axillary bypass, AV replacement (bio 23mm), and CABG x 1 (SVG-RCA) EF 75% (Ignacio, 3/20). Post op cb lactic acidosis, severe cardiogenic and vasogenic shock, TREY requiring CVVHD and temporary dialysis requirement. Recent admission 4/27-4/30 for seizure episode, were his AEDS were adjusted. Presented to [facility] ED 5/4 for epigastric pain. CT exam which showed known endoleak for which pt was tx to [facility]. Patient underwent TEVAR with Dr. Pierre 5/5, LD placed prior by NSGY. Arrived intubated on propofol. 5/6 extubated. 1 unit pRBC, CTH performed for decreased LE mvmt. Improved later in day. 5/7 LD clamped and patient ambulated, plan for dc.     FAMILY HISTORY:  No pertinent family history in first degree relatives  PAST MEDICAL & SURGICAL HISTORY:  HTN (hypertension  Aotic dissection  CAD (coronary artery disease)  Seizre disorder  S/P aortic bifurcation bypass graft  S/P CABG x 1    Patient is a 53y old  Male who presents with a chief complaint of endoleak, abdominal pain.      14 system review was unremarkable    Vital signs, hemodynamic and respiratory parameters were reviewed from the bedside nursing flowsheet.  ICU Vital Signs Last 24 Hrs  T(C): 36.8 (08 May 2023 05:16), Max: 37.1 (07 May 2023 22:18)  T(F): 98.2 (08 May 2023 05:16), Max: 98.8 (07 May 2023 22:18)  HR: 120 (08 May 2023 08:00) (73 - 120)  BP: --  BP(mean): --  ABP: 140/73 (08 May 2023 08:00) (122/54 - 158/74)  ABP(mean): 97 (08 May 2023 08:00) (77 - 103)  RR: 17 (08 May 2023 08:00) (16 - 18)  SpO2: 92% (08 May 2023 08:00) (92% - 100%)    O2 Parameters below as of 08 May 2023 08:00  Patient On (Oxygen Delivery Method): room air          Adult Advanced Hemodynamics Last 24 Hrs  CVP(mm Hg): --  CVP(cm H2O): --  CO: --  CI: --  PA: --  PA(mean): --  PCWP: --  SVR: --  SVRI: --  PVR: --  PVRI: --, ABG - ( 08 May 2023 02:21 )  pH, Arterial: 7.41  pH, Blood: x     /  pCO2: 41    /  pO2: 64    / HCO3: 26    / Base Excess: 1.2   /  SaO2: 94.4                Intake and output was reviewed and the fluid balance was calculated  Daily     Daily   I&O's Summary    07 May 2023 07:01  -  08 May 2023 07:00  --------------------------------------------------------  IN: 1387 mL / OUT: 1140 mL / NET: 247 mL        All lines and drain sites were assessed  Glycemic trend was reviewed    POCT Blood Glucose.: 97 mg/dL (08 May 2023 06:56)      Neuro: nad  HEENT: mmm  Heart: s1 s2  Lungs: clear bl  Abdomen: soft nt nd  Extremities: wwp    Lines:  r radial arterial line 5/5    Tubes:  LD    labs  CBC Full  -  ( 08 May 2023 01:44 )  WBC Count : 19.15 K/uL  RBC Count : 3.39 M/uL  Hemoglobin : 10.0 g/dL  Hematocrit : 30.2 %  Platelet Count - Automated : 210 K/uL  Mean Cell Volume : 89.1 fl  Mean Cell Hemoglobin : 29.5 pg  Mean Cell Hemoglobin Concentration : 33.1 gm/dL  Auto Neutrophil # : x  Auto Lymphocyte # : x  Auto Monocyte # : x  Auto Eosinophil # : x  Auto Basophil # : x  Auto Neutrophil % : x  Auto Lymphocyte % : x  Auto Monocyte % : x  Auto Eosinophil % : x  Auto Basophil % : x    05-08    135  |  101  |  22  ----------------------------<  83  3.9   |  25  |  1.22    Ca    8.3<L>      08 May 2023 01:44  Phos  3.7     05-08  Mg     2.0     05-08    TPro  5.6<L>  /  Alb  2.5<L>  /  TBili  0.3  /  DBili  x   /  AST  8<L>  /  ALT  <5<L>  /  AlkPhos  71  05-08    PT/INR - ( 08 May 2023 01:44 )   PT: 18.1 sec;   INR: 1.51          PTT - ( 08 May 2023 01:44 )  PTT:36.9 sec  The current medications were reviewed   MEDICATIONS  (STANDING):  atorvastatin 20 milliGRAM(s) Oral at bedtime  chlorhexidine 2% Cloths 1 Application(s) Topical daily  dextrose 5%. 1000 milliLiter(s) (50 mL/Hr) IV Continuous <Continuous>  dextrose 5%. 1000 milliLiter(s) (100 mL/Hr) IV Continuous <Continuous>  dextrose 50% Injectable 25 Gram(s) IV Push once  dextrose 50% Injectable 25 Gram(s) IV Push once  dextrose 50% Injectable 12.5 Gram(s) IV Push once  glucagon  Injectable 1 milliGRAM(s) IntraMuscular once  insulin lispro (ADMELOG) corrective regimen sliding scale   SubCutaneous three times a day before meals  lacosamide IVPB 100 milliGRAM(s) IV Intermittent every 12 hours  midodrine 10 milliGRAM(s) Oral every 8 hours  pantoprazole    Tablet 40 milliGRAM(s) Oral before breakfast  polyethylene glycol 3350 17 Gram(s) Oral daily  senna 2 Tablet(s) Oral at bedtime  sodium chloride 0.9%. 1000 milliLiter(s) (10 mL/Hr) IV Continuous <Continuous>  valproate sodium  IVPB 1000 milliGRAM(s) IV Intermittent every 12 hours    MEDICATIONS  (PRN):  acetaminophen     Tablet .. 650 milliGRAM(s) Oral every 6 hours PRN Mild Pain (1 - 3)  dextrose Oral Gel 15 Gram(s) Oral once PRN Blood Glucose LESS THAN 70 milliGRAM(s)/deciliter  oxyCODONE    IR 5 milliGRAM(s) Oral every 6 hours PRN Moderate Pain (4 - 6)  oxyCODONE    IR 10 milliGRAM(s) Oral every 6 hours PRN Severe Pain (7 - 10)      Assessment/Plan:  53yr old male with PMH HTN, type A dissection sp Dacron grafts and AV resuspension (2013), CAD sp CABG x 1 ([facility], 5/2013, SVG-RCA), seizures, admitted for surgical mgmt of progression of aneurysmal disease. Recent admission 3/20-4/14 during which patient underwent replacement of transverse aortic arch, second stage thoracic endovascular aortic repair, aorto-axillary bypass, AV replacement (bio 23mm), and CABG x 1 (SVG-RCA) EF 75% (Ignacio, 3/20). Post op cb lactic acidosis, severe cardiogenic and vasogenic shock, TREY requiring CVVHD and temporary dialysis requirement. Recent admission 4/27-4/30 for seizure episode, were his AEDS were adjusted. Presented to [facility] ED 5/4 for epigastric pain. CT exam which showed known endoleak for which pt was tx to [facility]. Patient underwent TEVAR with Dr. Pierre 5/5, LD placed prior by NSYAMILET. Arrived intubated on propofol. Given 2 units intraop, 1L IVF, 100cc urine output.    POD3 TEVAR (Ignacio, 5/5)  MAP goal   Neuro checks  LD-clamp, dc after ambulating  Midodrine q8-wean as tolerated  AEDS  Replete lytes prn  GI/DVT PPX  Diet as tolerated  Bowel Regimen  Pain control  OOB with PT  Close hemodynamic, ventilatory and drain monitoring and management per post op routine  Monitor for arrhythmias and monitor parameters for organ perfusion  Beta blockade not administered due to hemodynamic instability and bradycardia  Monitor neurologic status  Head of the bed should remain elevated to 45 deg   Chest PT and IS will be encouraged  Monitor adequacy of oxygenation and ventilation and attempt to wean oxygen  Antibiotic regimen will be tailored to the clinical, laboratory and microbiologic data  Nutritional goals will be met using po eventually, ensure adequate caloric intake and monitor the same  Stress ulcer and VTE prophylaxis will be achieved    Glycemic control is satisfactory  Electrolytes have been repleted as necessary and wound care has been carried out   Pain control has been achieved.   Aggressive physical therapy and early mobility and ambulation goals will be met   The family was updated about the course and plan.    CRITICAL CARE TIME personally provided by me  in evaluation and management, reassessments, review and interpretation of labs and x-rays, ventilator and hemodynamic management, formulating a plan and coordinating care: ___105____ MIN.  Time does not include procedural time.    CTICU ATTENDING     					  Alexx Brooks MD

## 2023-05-08 NOTE — PROGRESS NOTE ADULT - ASSESSMENT
52 M w/ PMHx of HTN, type A aortic dissection s/p Dacron grafts, AV resuspension in 2013, CAD s/p CABG x 1 SVG to RCA (5/2013 with Dr. Medina), seizure disorder who was admitted for surgical mgmt of progression of aneurysmal disease. Recent admission 3/20-4/14 during which patient underwent replacement of transverse aortic arch, second stage TEVAR and aorto-axillary bypass, AV replacement (bio 23mm), and CABG x 1 (SVG-RCA) EF 75% (Ignacio, 3/20). Post op cb severe cardiogenic and vasogenic shock, TREY requiring CVVHD and temporary dialysis. Camr off RRT and discharged home mid April.presented to [facility] ED 4/27 for syncope vs seizure episode at home. negative neuro work up for Seizures AED dose adjusted however imaging at that rime revealed recent graft endoleak, Admitted now for surgical mgmt.   S/p Second stage thoracic endovascular aortic repair   Post op course complicated by transient LE weakness, Improved   LD clamped and Dc's 5/8  worsening leukocytosis- CT C/A/P suggestive of necrotizing pancreatitis/ Lipase on admission > 1000 down to 95 5/8   A/p  HDS in Sinus MAPs> 80s on no gtt, low dose midodrine   Renal function improved to baseline Cr 1.2/ Clinically euvolemic   Started on Zosyn for necrotizing pancreatitis, renally dosed--> Follow Gen-surg recs/ Afebrile, monitor WBC count  H&H and plt stable and in good range  Good glycemic control on DASH diet   Pain mgmt with tylenol and low dose Oxy/ Continue Valproate and Vimpat   Sq Hep on hold for concern regarding groin hematoma/ US revealing seroma  To resume pharm DVT ppx/ continue PPI/ Bowel regimen and IS   OOB and ambulating as tolerated  Follow Gensurg final recs     ATTENDING: I have personally and independently provided 30 Min of critical care services.  52 M w/ PMHx of HTN, type A aortic dissection s/p Dacron grafts, AV resuspension in 2013, CAD s/p CABG x 1 SVG to RCA (5/2013 with Dr. Medina), seizure disorder who was admitted for surgical mgmt of progression of aneurysmal disease. Recent admission 3/20-4/14 during which patient underwent replacement of transverse aortic arch, second stage TEVAR and aorto-axillary bypass, AV replacement (bio 23mm), and CABG x 1 (SVG-RCA) EF 75% (Ignacio, 3/20). Post op cb severe cardiogenic and vasogenic shock, TREY requiring CVVHD and temporary dialysis. Camr off RRT and discharged home mid April.presented to [facility] ED 4/27 for syncope vs seizure episode at home. negative neuro work up for Seizures AED dose adjusted however imaging at that rime revealed recent graft endoleak, Admitted now for surgical mgmt.   S/p Second stage thoracic endovascular aortic repair   Post op course complicated by transient LE weakness, Improved   LD clamped and Dc's 5/8  worsening leukocytosis- CT C/A/P suggestive of necrotizing pancreatitis/ Lipase on admission > 1000 down to 95 5/8   A/p  HDS in Sinus MAPs> 80s on no gtt, low dose midodrine   Renal function improved to baseline Cr 1.2/ Clinically euvolemic   Started on Zosyn for necrotizing pancreatitis, renally dosed--> Follow Gen-surg recs/ Afebrile, monitor WBC count  H&H and plt stable and in good range  Good glycemic control on DASH diet   Pain mgmt with tylenol and low dose Oxy/ Continue Valproate and Vimpat   Sq Hep on hold for concern regarding groin hematoma/ US revealing seroma  To resume pharm DVT ppx if no drainage planned/ Continue SCDs /PPI/ Bowel regimen and IS   OOB and ambulating as tolerated  Follow Gensurg final recs     ATTENDING: I have personally and independently provided 30 Min of critical care services.  52 M w/ PMHx of HTN, type A aortic dissection s/p Dacron grafts, AV resuspension in 2013, CAD s/p CABG x 1 SVG to RCA (5/2013 with Dr. Medina), seizure disorder who was admitted for surgical mgmt of progression of aneurysmal disease. Recent admission 3/20-4/14 during which patient underwent replacement of transverse aortic arch, second stage TEVAR and aorto-axillary bypass, AV replacement (bio 23mm), and CABG x 1 (SVG-RCA) EF 75% (Ignacio, 3/20). Post op cb severe cardiogenic and vasogenic shock, TREY requiring CVVHD and temporary dialysis. Camr off RRT and discharged home mid April.presented to [facility] ED 4/27 for syncope vs seizure episode at home. negative neuro work up for Seizures AED dose adjusted however imaging at that rime revealed recent graft endoleak, Admitted now for surgical mgmt.   S/p Second stage thoracic endovascular aortic repair   Post op course complicated by transient LE weakness, Improved   LD clamped and Dc's 5/8  worsening leukocytosis- CT C/A/P suggestive of necrotizing pancreatitis/ Lipase on admission > 1000 down to 95 5/8   A/p  HDS in Sinus MAPs> 80s on no gtt, low dose midodrine   Renal function improved to baseline Cr 1.2/ Clinically euvolemic   Started on Zosyn for necrotizing pancreatitis, renally dosed--> Follow Gen-surg recs/ Afebrile, monitor WBC count  H&H and plt stable and in good range  Good glycemic control on DASH diet   Pain mgmt with tylenol and low dose Oxy/ Continue Valproate and Vimpat     Start pharm DVT ppx if no drainage planned/ Continue SCDs /PPI/ Bowel regimen and IS   OOB and ambulating as tolerated  Follow Gensurg final recs     ATTENDING: I have personally and independently provided 30 Min of critical care services.  52 M w/ PMHx of HTN, type A aortic dissection s/p Dacron grafts, AV resuspension in 2013, CAD s/p CABG x 1 SVG to RCA (5/2013 with Dr. Medina), seizure disorder who was admitted for surgical mgmt of progression of aneurysmal disease. Recent admission 3/20-4/14 during which patient underwent replacement of transverse aortic arch, second stage TEVAR and aorto-axillary bypass, AV replacement (bio 23mm), and CABG x 1 (SVG-RCA) EF 75% (Ignacio, 3/20). Post op cb severe cardiogenic and vasogenic shock, TREY requiring CVVHD and temporary dialysis. Camr off RRT and discharged home mid April.presented to [facility] ED 4/27 for syncope vs seizure episode at home. negative neuro work up for Seizures AED dose adjusted however imaging at that rime revealed recent graft endoleak, Admitted now for surgical mgmt.   S/p Second stage thoracic endovascular aortic repair   Post op course complicated by transient LE weakness, Improved   LD clamped and Dc's 5/8  worsening leukocytosis- CT C/A/P suggestive of necrotizing pancreatitis/ Lipase on admission > 1000 down to 95 5/8   A/p  HDS in Sinus MAPs> 80s on no gtt, low dose midodrine   Renal function improved to baseline Cr 1.2/ Clinically euvolemic   Started on Zosyn for ?necrotizing pancreatitis, renally dosed--> Follow Gen-surg recs/ Afebrile, monitor WBC count  H&H and plt stable and in good range  Good glycemic control on DASH diet   Pain mgmt with tylenol and low dose Oxy/ Continue Valproate and Vimpat   Start pharm DVT ppx if no drainage planned per surgery-GI/ Continue SCDs /PPI/ Bowel regimen and IS   OOB and ambulating as tolerated  Follow Gensurg final recs     ATTENDING: I have personally and independently provided 30 Min of critical care services.

## 2023-05-08 NOTE — CONSULT NOTE ADULT - ASSESSMENT
53yr old male with PMH daily marijuana use, HTN, type A dissection s/p Dacron grafts and AV resuspension (2013), CAD sp CABG x 1 ([facility], 5/2013, SVG-RCA), seizure disorder, and PSx of transverse aortic arch, second stage thoracic endovascular aortic repair, aorto-axillary bypass, AV replacement (bio 23mm), and CABG x 1 (SVG-RCA) EF 75% (Ignacio, 3/20). Presented to [place] ED 5/4 for epigastric pain. CT exam which showed known endoleak for which pt was tx to [facility]. Patient underwent TEVAR with Dr. Pierre 5/5, LD placed prior by NSGY. General surgery consulted on 5/8 with finding of diffuse pancreatic enlargement and heterogenous attenuation predominantly in body and tail, with multiple intrapancreatic and peripancreatic fluid collections with well-defined enhancing wall, new since November 30, 2022, not significantly changed since May 5, 2023 on CTA abdomen in the setting of uptrending leukocytosis. Pt afebrile since admission, tachycardic to 110s, normotensive on midodrine 5mg q8, and satting well on RA. Subjectively reports feeling well without abdominal pain, nausea, or vomiting since admission. Reports passing flatus and is tolerating a DASH diet. Labs significant for WBC 19.15 (10.2 on admission), Hb 10.0, TBili 0.3, AST 8, ALT <5, and Lipase 93 (1145 on admission). Subjective history, physical exam, laboratory data, and imaging suggestive of resolving pancreatitis with peripancreatic/perigastric collections.     Recommendations:    -CT scan reviewed and no evidence of necrosis, 2 collections noted but not likely to be infected given imaging findings, reassuring physical exam, and labs  -No acute surgical intervention indicated at this time  -RUQ u/s  -GI consult  -WBC improving, and patient afebrile with benign physical exam, abx might not be indicated, but would defer to primary team  -Rest of care per primary team  -Surgery Team 1C will continue to follow. Please page Team 1 with questions/clinical changes. [phone]

## 2023-05-09 LAB
ALBUMIN SERPL ELPH-MCNC: 2.4 G/DL — LOW (ref 3.3–5)
ALBUMIN SERPL ELPH-MCNC: 2.4 G/DL — LOW (ref 3.3–5)
ALBUMIN SERPL ELPH-MCNC: 2.6 G/DL — LOW (ref 3.3–5)
ALP SERPL-CCNC: 66 U/L — SIGNIFICANT CHANGE UP (ref 40–120)
ALP SERPL-CCNC: 87 U/L — SIGNIFICANT CHANGE UP (ref 40–120)
ALP SERPL-CCNC: 91 U/L — SIGNIFICANT CHANGE UP (ref 40–120)
ALT FLD-CCNC: <5 U/L — LOW (ref 10–45)
AMYLASE P1 CFR SERPL: 69 U/L — SIGNIFICANT CHANGE UP (ref 25–125)
AMYLASE P1 CFR SERPL: 70 U/L — SIGNIFICANT CHANGE UP (ref 25–125)
ANION GAP SERPL CALC-SCNC: 10 MMOL/L — SIGNIFICANT CHANGE UP (ref 5–17)
ANION GAP SERPL CALC-SCNC: 6 MMOL/L — SIGNIFICANT CHANGE UP (ref 5–17)
ANION GAP SERPL CALC-SCNC: 9 MMOL/L — SIGNIFICANT CHANGE UP (ref 5–17)
APTT BLD: 38.5 SEC — HIGH (ref 27.5–35.5)
APTT BLD: 39.2 SEC — HIGH (ref 27.5–35.5)
APTT BLD: 77.7 SEC — HIGH (ref 27.5–35.5)
APTT BLD: >200 SEC — CRITICAL HIGH (ref 27.5–35.5)
AST SERPL-CCNC: 10 U/L — SIGNIFICANT CHANGE UP (ref 10–40)
AST SERPL-CCNC: 9 U/L — LOW (ref 10–40)
AST SERPL-CCNC: 9 U/L — LOW (ref 10–40)
BASE EXCESS BLDV CALC-SCNC: 1.5 MMOL/L — SIGNIFICANT CHANGE UP (ref -2–3)
BILIRUB SERPL-MCNC: 0.4 MG/DL — SIGNIFICANT CHANGE UP (ref 0.2–1.2)
BILIRUB SERPL-MCNC: 0.4 MG/DL — SIGNIFICANT CHANGE UP (ref 0.2–1.2)
BILIRUB SERPL-MCNC: 0.5 MG/DL — SIGNIFICANT CHANGE UP (ref 0.2–1.2)
BUN SERPL-MCNC: 19 MG/DL — SIGNIFICANT CHANGE UP (ref 7–23)
BUN SERPL-MCNC: 20 MG/DL — SIGNIFICANT CHANGE UP (ref 7–23)
BUN SERPL-MCNC: 21 MG/DL — SIGNIFICANT CHANGE UP (ref 7–23)
CALCIUM SERPL-MCNC: 7.5 MG/DL — LOW (ref 8.4–10.5)
CALCIUM SERPL-MCNC: 8.3 MG/DL — LOW (ref 8.4–10.5)
CALCIUM SERPL-MCNC: 8.4 MG/DL — SIGNIFICANT CHANGE UP (ref 8.4–10.5)
CHLORIDE SERPL-SCNC: 100 MMOL/L — SIGNIFICANT CHANGE UP (ref 96–108)
CHLORIDE SERPL-SCNC: 100 MMOL/L — SIGNIFICANT CHANGE UP (ref 96–108)
CHLORIDE SERPL-SCNC: 101 MMOL/L — SIGNIFICANT CHANGE UP (ref 96–108)
CO2 BLDV-SCNC: 27.9 MMOL/L — HIGH (ref 22–26)
CO2 SERPL-SCNC: 24 MMOL/L — SIGNIFICANT CHANGE UP (ref 22–31)
CO2 SERPL-SCNC: 26 MMOL/L — SIGNIFICANT CHANGE UP (ref 22–31)
CO2 SERPL-SCNC: 26 MMOL/L — SIGNIFICANT CHANGE UP (ref 22–31)
CREAT SERPL-MCNC: 1.05 MG/DL — SIGNIFICANT CHANGE UP (ref 0.5–1.3)
CREAT SERPL-MCNC: 1.14 MG/DL — SIGNIFICANT CHANGE UP (ref 0.5–1.3)
CREAT SERPL-MCNC: 1.24 MG/DL — SIGNIFICANT CHANGE UP (ref 0.5–1.3)
EGFR: 69 ML/MIN/1.73M2 — SIGNIFICANT CHANGE UP
EGFR: 76 ML/MIN/1.73M2 — SIGNIFICANT CHANGE UP
EGFR: 84 ML/MIN/1.73M2 — SIGNIFICANT CHANGE UP
GAS PNL BLDA: SIGNIFICANT CHANGE UP
GLUCOSE BLDC GLUCOMTR-MCNC: 100 MG/DL — HIGH (ref 70–99)
GLUCOSE BLDC GLUCOMTR-MCNC: 59 MG/DL — LOW (ref 70–99)
GLUCOSE BLDC GLUCOMTR-MCNC: 75 MG/DL — SIGNIFICANT CHANGE UP (ref 70–99)
GLUCOSE BLDC GLUCOMTR-MCNC: 76 MG/DL — SIGNIFICANT CHANGE UP (ref 70–99)
GLUCOSE BLDC GLUCOMTR-MCNC: 84 MG/DL — SIGNIFICANT CHANGE UP (ref 70–99)
GLUCOSE SERPL-MCNC: 100 MG/DL — HIGH (ref 70–99)
GLUCOSE SERPL-MCNC: 126 MG/DL — HIGH (ref 70–99)
GLUCOSE SERPL-MCNC: 89 MG/DL — SIGNIFICANT CHANGE UP (ref 70–99)
HCO3 BLDV-SCNC: 27 MMOL/L — SIGNIFICANT CHANGE UP (ref 22–29)
HCT VFR BLD CALC: 27.3 % — LOW (ref 39–50)
HCT VFR BLD CALC: 32.5 % — LOW (ref 39–50)
HCT VFR BLD CALC: 33.1 % — LOW (ref 39–50)
HGB BLD-MCNC: 10.7 G/DL — LOW (ref 13–17)
HGB BLD-MCNC: 10.9 G/DL — LOW (ref 13–17)
HGB BLD-MCNC: 8.9 G/DL — LOW (ref 13–17)
INR BLD: 1.38 — HIGH (ref 0.88–1.16)
INR BLD: 1.42 — HIGH (ref 0.88–1.16)
LACTATE SERPL-SCNC: 0.5 MMOL/L — SIGNIFICANT CHANGE UP (ref 0.5–2)
LACTATE SERPL-SCNC: 2.2 MMOL/L — HIGH (ref 0.5–2)
LIDOCAIN IGE QN: 103 U/L — HIGH (ref 7–60)
LIDOCAIN IGE QN: 110 U/L — HIGH (ref 7–60)
MAGNESIUM SERPL-MCNC: 2.2 MG/DL — SIGNIFICANT CHANGE UP (ref 1.6–2.6)
MAGNESIUM SERPL-MCNC: 2.2 MG/DL — SIGNIFICANT CHANGE UP (ref 1.6–2.6)
MAGNESIUM SERPL-MCNC: 2.4 MG/DL — SIGNIFICANT CHANGE UP (ref 1.6–2.6)
MCHC RBC-ENTMCNC: 29.3 PG — SIGNIFICANT CHANGE UP (ref 27–34)
MCHC RBC-ENTMCNC: 29.6 PG — SIGNIFICANT CHANGE UP (ref 27–34)
MCHC RBC-ENTMCNC: 29.7 PG — SIGNIFICANT CHANGE UP (ref 27–34)
MCHC RBC-ENTMCNC: 32.6 GM/DL — SIGNIFICANT CHANGE UP (ref 32–36)
MCHC RBC-ENTMCNC: 32.9 GM/DL — SIGNIFICANT CHANGE UP (ref 32–36)
MCHC RBC-ENTMCNC: 32.9 GM/DL — SIGNIFICANT CHANGE UP (ref 32–36)
MCV RBC AUTO: 89 FL — SIGNIFICANT CHANGE UP (ref 80–100)
MCV RBC AUTO: 90.2 FL — SIGNIFICANT CHANGE UP (ref 80–100)
MCV RBC AUTO: 90.7 FL — SIGNIFICANT CHANGE UP (ref 80–100)
NRBC # BLD: 0 /100 WBCS — SIGNIFICANT CHANGE UP (ref 0–0)
PCO2 BLDV: 43 MMHG — SIGNIFICANT CHANGE UP (ref 42–55)
PH BLDV: 7.4 — SIGNIFICANT CHANGE UP (ref 7.32–7.43)
PHOSPHATE SERPL-MCNC: 3 MG/DL — SIGNIFICANT CHANGE UP (ref 2.5–4.5)
PHOSPHATE SERPL-MCNC: 3.7 MG/DL — SIGNIFICANT CHANGE UP (ref 2.5–4.5)
PHOSPHATE SERPL-MCNC: 3.7 MG/DL — SIGNIFICANT CHANGE UP (ref 2.5–4.5)
PLATELET # BLD AUTO: 183 K/UL — SIGNIFICANT CHANGE UP (ref 150–400)
PLATELET # BLD AUTO: 221 K/UL — SIGNIFICANT CHANGE UP (ref 150–400)
PLATELET # BLD AUTO: 225 K/UL — SIGNIFICANT CHANGE UP (ref 150–400)
PO2 BLDV: 49 MMHG — HIGH (ref 25–45)
POTASSIUM SERPL-MCNC: 3.6 MMOL/L — SIGNIFICANT CHANGE UP (ref 3.5–5.3)
POTASSIUM SERPL-MCNC: 3.8 MMOL/L — SIGNIFICANT CHANGE UP (ref 3.5–5.3)
POTASSIUM SERPL-MCNC: 3.8 MMOL/L — SIGNIFICANT CHANGE UP (ref 3.5–5.3)
POTASSIUM SERPL-SCNC: 3.6 MMOL/L — SIGNIFICANT CHANGE UP (ref 3.5–5.3)
POTASSIUM SERPL-SCNC: 3.8 MMOL/L — SIGNIFICANT CHANGE UP (ref 3.5–5.3)
POTASSIUM SERPL-SCNC: 3.8 MMOL/L — SIGNIFICANT CHANGE UP (ref 3.5–5.3)
PROT SERPL-MCNC: 5.2 G/DL — LOW (ref 6–8.3)
PROT SERPL-MCNC: 5.7 G/DL — LOW (ref 6–8.3)
PROT SERPL-MCNC: 5.9 G/DL — LOW (ref 6–8.3)
PROTHROM AB SERPL-ACNC: 16.5 SEC — HIGH (ref 10.5–13.4)
PROTHROM AB SERPL-ACNC: 16.9 SEC — HIGH (ref 10.5–13.4)
PROTHROM AB SERPL-ACNC: 16.9 SEC — HIGH (ref 10.5–13.4)
PROTHROM AB SERPL-ACNC: 17 SEC — HIGH (ref 10.5–13.4)
RBC # BLD: 3.01 M/UL — LOW (ref 4.2–5.8)
RBC # BLD: 3.65 M/UL — LOW (ref 4.2–5.8)
RBC # BLD: 3.67 M/UL — LOW (ref 4.2–5.8)
RBC # FLD: 15.4 % — HIGH (ref 10.3–14.5)
RBC # FLD: 15.7 % — HIGH (ref 10.3–14.5)
RBC # FLD: 15.8 % — HIGH (ref 10.3–14.5)
SAO2 % BLDV: 83.3 % — SIGNIFICANT CHANGE UP (ref 67–88)
SODIUM SERPL-SCNC: 133 MMOL/L — LOW (ref 135–145)
SODIUM SERPL-SCNC: 134 MMOL/L — LOW (ref 135–145)
SODIUM SERPL-SCNC: 135 MMOL/L — SIGNIFICANT CHANGE UP (ref 135–145)
WBC # BLD: 12.46 K/UL — HIGH (ref 3.8–10.5)
WBC # BLD: 14.41 K/UL — HIGH (ref 3.8–10.5)
WBC # BLD: 16.86 K/UL — HIGH (ref 3.8–10.5)
WBC # FLD AUTO: 12.46 K/UL — HIGH (ref 3.8–10.5)
WBC # FLD AUTO: 14.41 K/UL — HIGH (ref 3.8–10.5)
WBC # FLD AUTO: 16.86 K/UL — HIGH (ref 3.8–10.5)

## 2023-05-09 RX ORDER — ALBUMIN HUMAN 25 %
250 VIAL (ML) INTRAVENOUS ONCE
Refills: 0 | Status: COMPLETED | OUTPATIENT
Start: 2023-05-09 | End: 2023-05-09

## 2023-05-09 RX ORDER — FENTANYL CITRATE 50 UG/ML
25 INJECTION INTRAVENOUS ONCE
Refills: 0 | Status: DISCONTINUED | OUTPATIENT
Start: 2023-05-09 | End: 2023-05-09

## 2023-05-09 RX ORDER — ELECTROLYTE SOLUTION,INJ
1 VIAL (ML) INTRAVENOUS
Refills: 0 | Status: DISCONTINUED | OUTPATIENT
Start: 2023-05-09 | End: 2023-05-09

## 2023-05-09 RX ORDER — ELECTROLYTE SOLUTION,INJ
1 VIAL (ML) INTRAVENOUS
Refills: 0 | Status: DISCONTINUED | OUTPATIENT
Start: 2023-05-09 | End: 2023-05-10

## 2023-05-09 RX ORDER — FUROSEMIDE 40 MG
20 TABLET ORAL ONCE
Refills: 0 | Status: COMPLETED | OUTPATIENT
Start: 2023-05-09 | End: 2023-05-09

## 2023-05-09 RX ORDER — DEXMEDETOMIDINE HYDROCHLORIDE IN 0.9% SODIUM CHLORIDE 4 UG/ML
0.7 INJECTION INTRAVENOUS
Qty: 400 | Refills: 0 | Status: DISCONTINUED | OUTPATIENT
Start: 2023-05-09 | End: 2023-05-10

## 2023-05-09 RX ORDER — POTASSIUM CHLORIDE 20 MEQ
20 PACKET (EA) ORAL
Refills: 0 | Status: COMPLETED | OUTPATIENT
Start: 2023-05-09 | End: 2023-05-09

## 2023-05-09 RX ORDER — ALBUMIN HUMAN 25 %
50 VIAL (ML) INTRAVENOUS ONCE
Refills: 0 | Status: COMPLETED | OUTPATIENT
Start: 2023-05-09 | End: 2023-05-09

## 2023-05-09 RX ORDER — PROPOFOL 10 MG/ML
50 INJECTION, EMULSION INTRAVENOUS ONCE
Refills: 0 | Status: COMPLETED | OUTPATIENT
Start: 2023-05-09 | End: 2023-05-09

## 2023-05-09 RX ORDER — MIDAZOLAM HYDROCHLORIDE 1 MG/ML
2 INJECTION, SOLUTION INTRAMUSCULAR; INTRAVENOUS ONCE
Refills: 0 | Status: DISCONTINUED | OUTPATIENT
Start: 2023-05-09 | End: 2023-05-09

## 2023-05-09 RX ORDER — I.V. FAT EMULSION 20 G/100ML
0.71 EMULSION INTRAVENOUS
Qty: 50 | Refills: 0 | Status: DISCONTINUED | OUTPATIENT
Start: 2023-05-09 | End: 2023-05-10

## 2023-05-09 RX ADMIN — ATORVASTATIN CALCIUM 20 MILLIGRAM(S): 80 TABLET, FILM COATED ORAL at 21:31

## 2023-05-09 RX ADMIN — MIDODRINE HYDROCHLORIDE 5 MILLIGRAM(S): 2.5 TABLET ORAL at 01:47

## 2023-05-09 RX ADMIN — PIPERACILLIN AND TAZOBACTAM 25 GRAM(S): 4; .5 INJECTION, POWDER, LYOPHILIZED, FOR SOLUTION INTRAVENOUS at 05:00

## 2023-05-09 RX ADMIN — MIDODRINE HYDROCHLORIDE 5 MILLIGRAM(S): 2.5 TABLET ORAL at 09:09

## 2023-05-09 RX ADMIN — MIDODRINE HYDROCHLORIDE 5 MILLIGRAM(S): 2.5 TABLET ORAL at 17:55

## 2023-05-09 RX ADMIN — PIPERACILLIN AND TAZOBACTAM 25 GRAM(S): 4; .5 INJECTION, POWDER, LYOPHILIZED, FOR SOLUTION INTRAVENOUS at 21:30

## 2023-05-09 RX ADMIN — Medication 100 MILLIEQUIVALENT(S): at 17:55

## 2023-05-09 RX ADMIN — LACOSAMIDE 120 MILLIGRAM(S): 50 TABLET ORAL at 19:32

## 2023-05-09 RX ADMIN — Medication 60 MILLIGRAM(S): at 17:55

## 2023-05-09 RX ADMIN — Medication 50 MILLILITER(S): at 14:17

## 2023-05-09 RX ADMIN — PANTOPRAZOLE SODIUM 40 MILLIGRAM(S): 20 TABLET, DELAYED RELEASE ORAL at 06:46

## 2023-05-09 RX ADMIN — Medication 60 MILLIGRAM(S): at 05:14

## 2023-05-09 RX ADMIN — Medication 100 MILLIEQUIVALENT(S): at 19:32

## 2023-05-09 RX ADMIN — OXYCODONE HYDROCHLORIDE 5 MILLIGRAM(S): 5 TABLET ORAL at 21:43

## 2023-05-09 RX ADMIN — Medication 125 MILLILITER(S): at 11:13

## 2023-05-09 RX ADMIN — Medication 20 MILLIGRAM(S): at 08:22

## 2023-05-09 RX ADMIN — Medication 1 EACH: at 21:30

## 2023-05-09 RX ADMIN — LACOSAMIDE 120 MILLIGRAM(S): 50 TABLET ORAL at 06:45

## 2023-05-09 RX ADMIN — OXYCODONE HYDROCHLORIDE 10 MILLIGRAM(S): 5 TABLET ORAL at 10:02

## 2023-05-09 RX ADMIN — MIDAZOLAM HYDROCHLORIDE 2 MILLIGRAM(S): 1 INJECTION, SOLUTION INTRAMUSCULAR; INTRAVENOUS at 12:20

## 2023-05-09 RX ADMIN — OXYCODONE HYDROCHLORIDE 5 MILLIGRAM(S): 5 TABLET ORAL at 22:13

## 2023-05-09 RX ADMIN — DEXMEDETOMIDINE HYDROCHLORIDE IN 0.9% SODIUM CHLORIDE 12.3 MICROGRAM(S)/KG/HR: 4 INJECTION INTRAVENOUS at 12:44

## 2023-05-09 RX ADMIN — DEXMEDETOMIDINE HYDROCHLORIDE IN 0.9% SODIUM CHLORIDE 12.3 MICROGRAM(S)/KG/HR: 4 INJECTION INTRAVENOUS at 21:30

## 2023-05-09 RX ADMIN — I.V. FAT EMULSION 20.83 GM/KG/DAY: 20 EMULSION INTRAVENOUS at 21:30

## 2023-05-09 RX ADMIN — PIPERACILLIN AND TAZOBACTAM 25 GRAM(S): 4; .5 INJECTION, POWDER, LYOPHILIZED, FOR SOLUTION INTRAVENOUS at 13:49

## 2023-05-09 RX ADMIN — FENTANYL CITRATE 25 MICROGRAM(S): 50 INJECTION INTRAVENOUS at 12:00

## 2023-05-09 RX ADMIN — OXYCODONE HYDROCHLORIDE 10 MILLIGRAM(S): 5 TABLET ORAL at 09:08

## 2023-05-09 RX ADMIN — SENNA PLUS 2 TABLET(S): 8.6 TABLET ORAL at 21:31

## 2023-05-09 RX ADMIN — FENTANYL CITRATE 25 MICROGRAM(S): 50 INJECTION INTRAVENOUS at 12:15

## 2023-05-09 NOTE — PROGRESS NOTE ADULT - ASSESSMENT
53yr old male with PMH daily marijuana use, HTN, type A dissection s/p Dacron grafts and AV resuspension (2013), CAD sp CABG x 1 ([facility], 5/2013, SVG-RCA), seizure disorder, and PSx of transverse aortic arch, second stage thoracic endovascular aortic repair, aorto-axillary bypass, AV replacement (bio 23mm), and CABG x 1 (SVG-RCA) EF 75% (Ignacio, 3/20). Presented to [place] ED 5/4 for epigastric pain. CT exam which showed known endoleak for which pt was tx to [facility]. Patient underwent TEVAR with Dr. Pierre 5/5, LD placed prior by NSGY. General surgery consulted on 5/8 with finding of diffuse pancreatic enlargement and heterogenous attenuation predominantly in body and tail, with multiple intrapancreatic and peripancreatic fluid collections with well-defined enhancing wall, new since November 30, 2022, not significantly changed since May 5, 2023 on CTA abdomen in the setting of uptrending leukocytosis. Pt afebrile since admission, tachycardic to 110s, normotensive on midodrine 5mg q8, and satting well on RA. Subjectively reports feeling well without abdominal pain, nausea, or vomiting since admission. Reports passing flatus and is tolerating a DASH diet. Subjective history, physical exam, laboratory data, and imaging suggestive of resolving pancreatitis with peripancreatic/perigastric collections (not necrotizing pancreatitis). WBC is improving 16.86 (19.15).     -No acute surgical intervention indicated at this time. Pancreatitis task force activated  - RUQ u/s, MRCP  - f/u GI consult  -WBC improving, and patient afebrile with benign physical exam, abx might not be indicated, but would defer to primary team  -Rest of care per primary team  - Surgery Team 4C will continue to follow. Please page Team 4 with questions/clinical changes. [phone]   53yr old male with PMH daily marijuana use, HTN, type A dissection s/p Dacron grafts and AV resuspension (2013), CAD sp CABG x 1 ([facility], 5/2013, SVG-RCA), seizure disorder, and PSx of transverse aortic arch, second stage thoracic endovascular aortic repair, aorto-axillary bypass, AV replacement (bio 23mm), and CABG x 1 (SVG-RCA) EF 75% (Ignacio, 3/20). Presented to [place] ED 5/4 for epigastric pain. CT exam which showed known endoleak for which pt was tx to [facility]. Patient underwent TEVAR with Dr. Pierre 5/5, LD placed prior by NSGY. General surgery consulted on 5/8 with finding of diffuse pancreatic enlargement and heterogenous attenuation predominantly in body and tail, with multiple intrapancreatic and peripancreatic fluid collections with well-defined enhancing wall, new since November 30, 2022, not significantly changed since May 5, 2023 on CTA abdomen in the setting of uptrending leukocytosis. Pt afebrile since admission, tachycardic to 110s, normotensive on midodrine 5mg q8, and satting well on RA. Subjectively reports feeling well without abdominal pain, nausea, or vomiting since admission. Reports passing flatus and is tolerating a DASH diet. Subjective history, physical exam, laboratory data, and imaging suggestive of resolving pancreatitis with peripancreatic/perigastric collections (not necrotizing pancreatitis). WBC is improving 16.86 (19.15).     -No acute surgical intervention indicated at this time. Pancreatitis task force activated  - RUQ u/s, MRCP  - f/u GI consult, IR consult as part of the pancreatitis task force  -WBC improving, and patient afebrile with benign physical exam, abx might not be indicated, but would defer to primary team  -Rest of care per primary team  - Surgery Team 4C will continue to follow. Please page Team 4 with questions/clinical changes. [phone]

## 2023-05-09 NOTE — PROGRESS NOTE ADULT - SUBJECTIVE AND OBJECTIVE BOX
Moderate sedation was performed by me using midazolam and fentanyl  for the procedure of cvl and right heart cath   continuous hemodynamic monitoring was performed   continuous monitoring of adequacy of oxygenation and ventilation was performed  patient remained stable thorughout the procedure  post - sedation monitoring of hemodynamics and oxygenation and ventilation was performed  Time required for moderate sedation was 30 minutes  time does not include proceural time or critical care time

## 2023-05-09 NOTE — CONSULT NOTE ADULT - SUBJECTIVE AND OBJECTIVE BOX
51 YO Male, current every day marijuana use, w/ PMHx of HTN, type A aortic dissection s/p Dacron grafts, AV resuspension in 2013, CAD s/p CABG x 1 SVG to RCA (5/2013 with Dr. Medina), seizure disorder (last episode on 7/4/22) who was admitted for surgical mgmt of progression of aneurysmal disease.     Recent admission 3/20-4/14 during which patient underwent replacement of transverse aortic arch, second stage thoracic endovascular aortic repair, aorto-axillary bypass, AV replacement (bio 23mm), and CABG x 1 (SVG-RCA) EF 75% (Ignacio, 3/20). Post op cb lactic acidosis, severe cardiogenic and vasogenic shock, TREY requiring CVVHD and temporary dialysis requirement. Patient presented to [facility] ED 4/27 for syncope vs seizure episode at home, falling onto back of head. Neurological workup preformed during that visit revealed a negative EEG, but given clinical picture of seizures, pt started on vimpat and depakote. Imaging preformed during that admission did no require acute surgical intervention on endoleak.     Pt presented to [facility] ED on 5/4 with worsening epigastric pain. CTAP revealed continued endoleak. Sent to [facility] for further evaluation.  He then underwent TEVAR with Dr. Pierre 5/5, LD placed prior by NSGY. Pancreatitis task force consulted on 5/9 with finding of diffuse pancreatic enlargement and heterogenous attenuation predominantly in body and tail, with multiple intrapancreatic and peripancreatic fluid collections with well-defined enhancing wall, new since November 30, 2022, not significantly changed since May 5, 2023 on CTA abdomen in the setting of uptrending leukocytosis.     Pt afebrile since admission, tachycardic to 110s, normotensive on midodrine 5mg q8, and satting well on RA. Subjectively reports feeling well without abdominal pain, nausea, or vomiting since admission.  No prior hx of acute pancreatitis.  No known hx of cholelithiasis.  Does not use ETOH regularly.      Reports passing flatus and is tolerating a DASH diet.        (05 May 2023 09:41)    Allergies    No Known Allergies    Intolerances      Home Medications:  acetaminophen 325 mg oral tablet: 2 tab(s) orally every 6 hours (05 May 2023 09:59)  divalproex sodium 500 mg oral tablet, extended release: 2 tab(s) orally every 12 hours (05 May 2023 09:59)    MEDICATIONS:  MEDICATIONS  (STANDING):  albumin human 25% IVPB 50 milliLiter(s) IV Intermittent once  atorvastatin 20 milliGRAM(s) Oral at bedtime  chlorhexidine 2% Cloths 1 Application(s) Topical daily  dexMEDEtomidine Infusion 0.7 MICROgram(s)/kG/Hr (12.3 mL/Hr) IV Continuous <Continuous>  dextrose 5%. 1000 milliLiter(s) (50 mL/Hr) IV Continuous <Continuous>  dextrose 5%. 1000 milliLiter(s) (100 mL/Hr) IV Continuous <Continuous>  dextrose 50% Injectable 25 Gram(s) IV Push once  dextrose 50% Injectable 25 Gram(s) IV Push once  dextrose 50% Injectable 12.5 Gram(s) IV Push once  fat emulsion (Fish Oil and Plant Based) 20% Infusion 0.7142 Gm/kG/Day (20.8 mL/Hr) IV Continuous <Continuous>  fentaNYL    Injectable 25 MICROGram(s) IV Push once  glucagon  Injectable 1 milliGRAM(s) IntraMuscular once  insulin lispro (ADMELOG) corrective regimen sliding scale   SubCutaneous Before meals and at bedtime  lacosamide IVPB 100 milliGRAM(s) IV Intermittent every 12 hours  midazolam Injectable 2 milliGRAM(s) IV Push once  midazolam Injectable 2 milliGRAM(s) IV Push once  midodrine 5 milliGRAM(s) Oral every 8 hours  pantoprazole    Tablet 40 milliGRAM(s) Oral before breakfast  Parenteral Nutrition - Adult 1 Each (50 mL/Hr) TPN Continuous <Continuous>  piperacillin/tazobactam IVPB.. 3.375 Gram(s) IV Intermittent every 8 hours  polyethylene glycol 3350 17 Gram(s) Oral daily  propofol Injectable 50 milliGRAM(s) IV Push once  senna 2 Tablet(s) Oral at bedtime  sodium chloride 0.9%. 1000 milliLiter(s) (10 mL/Hr) IV Continuous <Continuous>  valproate sodium  IVPB 1000 milliGRAM(s) IV Intermittent every 12 hours    MEDICATIONS  (PRN):  acetaminophen     Tablet .. 650 milliGRAM(s) Oral every 6 hours PRN Mild Pain (1 - 3)  dextrose Oral Gel 15 Gram(s) Oral once PRN Blood Glucose LESS THAN 70 milliGRAM(s)/deciliter  oxyCODONE    IR 5 milliGRAM(s) Oral every 6 hours PRN Moderate Pain (4 - 6)  oxyCODONE    IR 10 milliGRAM(s) Oral every 6 hours PRN Severe Pain (7 - 10)    PAST MEDICAL & SURGICAL HISTORY:  HTN (hypertension)      Aortic dissection      CAD (coronary artery disease)      Seizure disorder      S/P aortic bifurcation bypass graft      S/P CABG x 1        FAMILY HISTORY:  No pertinent family history in first degree relatives  No family hx of pancreatic disease    SOCIAL HISTORY:  MJ use  Denies ETOH    REVIEW OF SYSTEMS:  All other 10 review of systems is negative unless indicated above.    Vital Signs Last 24 Hrs  T(C): 37.1 (09 May 2023 08:43), Max: 37.1 (08 May 2023 13:58)  T(F): 98.7 (09 May 2023 08:43), Max: 98.8 (08 May 2023 13:58)  HR: 90 (09 May 2023 12:06) (90 - 118)  BP: 137/69 (08 May 2023 21:00) (137/69 - 137/69)  BP(mean): 95 (08 May 2023 21:00) (95 - 95)  RR: 18 (09 May 2023 12:06) (16 - 20)  SpO2: 100% (09 May 2023 12:06) (91% - 100%)    Parameters below as of 09 May 2023 12:06  Patient On (Oxygen Delivery Method): nasal cannula, high flow  O2 Flow (L/min): 50  O2 Concentration (%): 50    05-08 @ 07:01  -  05-09 @ 07:00  --------------------------------------------------------  IN: 240 mL / OUT: 250 mL / NET: -10 mL    05-09 @ 07:01  -  05-09 @ 12:19  --------------------------------------------------------  IN: 240 mL / OUT: 540 mL / NET: -300 mL        PHYSICAL EXAM:    General: No acute distress, on high flow NC  Eyes: Anicteric sclerae, moist conjunctivae  HENT: Moist mucous membranes  Neck: Trachea midline, supple  Lungs: Normal respiratory effort and no intercostal retractions  Cardiovascular: RRR  Abdomen: Soft, non-tender non-distended; prior surgical incisions healed. No rebound or guarding  Neurological: Alert and oriented x3  Skin: Warm and dry. No obvious rash    LABS:                        10.9   14.41 )-----------( 221      ( 09 May 2023 09:41 )             33.1     05-09    134<L>  |  100  |  20  ----------------------------<  126<H>  3.6   |  24  |  1.14    Ca    8.3<L>      09 May 2023 09:41  Phos  3.0     05-09  Mg     2.2     05-09    TPro  5.9<L>  /  Alb  2.4<L>  /  TBili  0.4  /  DBili  x   /  AST  10  /  ALT  <5<L>  /  AlkPhos  87  05-09        PT/INR - ( 09 May 2023 09:41 )   PT: 16.9 sec;   INR: 1.42          PTT - ( 09 May 2023 09:41 )  PTT:77.7 sec    RADIOLOGY & ADDITIONAL STUDIES:       ACC: [id number] EXAM:  CT ANGIO ABD PELV (W)AW IC   ORDERED BY: AGUILAR STRONG     ACC: [id number] EXAM:  CT ANGIO CHEST AORTA WAWIC   ORDERED BY: AGUILAR STRONG     PROCEDURE DATE:  05/08/2023          INTERPRETATION:  CLINICAL INFORMATION: Chest and abdominal pain.    COMPARISON: CTA chest abdomen pelvis May 5, 2023 and prior exams dating   back to November 30, 2022.    CONTRAST/COMPLICATIONS:  IV Contrast: Isovue 370  90 cc administered   10 cc discarded  Oral Contrast: NONE  Complications: None reported at time of study completion    PROCEDURE:  CT Angiography of the Chest, Abdomen and Pelvis.  Precontrast imaging was performed through the chest followed by arterial   phase imaging of the chest, abdomen and pelvis.  Sagittal and coronal reformats were performed as well as 3D (MIP)   reconstructions.    FINDINGS:    VASCULATURE: Interval endovascular stent placement mid descending   thoracic aorta to supraceliac level. Patent endovascular graft. Otherwise   unchanged caliber dilated aorta, for reference 5 cm at level of aortic   isthmus (608:59) and 6 cm in mid descending thoracic aorta 6 cm (608:54),   suprarenal aorta, 4.2 cm (608:61), infrarenal aorta 3.2 cm (608:60).   Otherwise stable appearance endograft repair of type A aortic dissection.   No endoleak. Stable extent of dissection flap extending cephalad into   right brachiocephalic and right carotid arteries. Aortic dissection flap   extends distally into left common and left proximal external iliac   artery. The celiac artery, true lumen supplies SMA and right renal   artery. False lumen supplies left renal artery and inferior mesenteric   artery. The central pulmonary embolism. Mixing artifact noted in right   interlobar pulmonary artery and evaluation of right-sided lower lobar,   segmental and subsegmental branches is limited. Portal vein, SMV, splenic   vein appears patent.    CHEST:  LUNGS AND LARGE AIRWAYS: Patent central airways. Increase mild passive   atelectasis bilateral lower lobes.  PLEURA: Increased small bilateral pleural effusions.  HEART: Redemonstrated aortic valve replacement. Cardiomegaly. No   pericardial effusion.  MEDIASTINUM AND MARJAN: No lymphadenopathy.  CHEST WALL AND LOWER NECK: Within normal limits.    ABDOMEN AND PELVIS:  LIVER: Within normal limits.  BILE DUCTS: Normal caliber.  GALLBLADDER: Within normal limits.  SPLEEN: Within normal limits.  PANCREAS: Diffuse pancreatic enlargement and heterogenous attenuation   predominantly in body and tail, with multiple intrapancreatic and   peripancreatic fluid collections with well-defined enhancing wall, new   since November 30, 2022, not significantly changed since May 5, 2023 upon   directed review. Largest fluid collection along stomach greater curvature   8.5 x 2 x 2 cm, communicates with inferior fluid collection measuring 5 x   3 x 3 cm.  Main pancreatic duct at head/uncinate appears mildly dilated   up to 5 mm and is poorly visualized at pancreatic neck body and tail.  ADRENALS: Within normal limits.  KIDNEYS/URETERS: Small size left kidney. Otherwise both kidneys are   unremarkable.    BLADDER: Within normal limits.  REPRODUCTIVE ORGANS: Prostate within normal limits.  BOWEL: No bowel obstruction.  PERITONEUM: Moderate pelvic ascites and small upper abdominal ascites   similar to prior.    RETROPERITONEUM/LYMPH NODES: No lymphadenopathy.  ABDOMINAL WALL: Postsurgical changes. In both groins with multiple   surgical clips. Stable simple appearing fluid pocket in right groin   subcutaneous tissues most compatible with old hematoma or seroma 4 x 3 cm  BONES: Median sternotomy. Degenerative changes.    IMPRESSION:  No endoleak status post repair. Stable caliber dilated thoracic and   abdominal aorta. Stable extent of dissection flap.  Acute pancreatitis at body/tail with multiple large   peripancreatic/perigastric abscesses, similar appearance to May 5, 2023.   Recommend GI consultation, consider follow-up contrast-enhanced pancreas   protocol MRI.  Increased small bilateral pleural effusions and adjacent lower lobe   atelectasis.  Stable moderate ascites and diffuse anasarca.    Findings were discussed with Dr. AGUIALR STRONG [phone] 5/8/2023 6:05   PM by Dr. Betts with read back confirmation.    --- End of Report ---            JULI BETTS MD; Attending Radiologist  This document has been electronically signed. May  8 2023  6:43PM

## 2023-05-09 NOTE — PROGRESS NOTE ADULT - SUBJECTIVE AND OBJECTIVE BOX
SUBJECTIVE: Pt seen and examined at bedside this am by surgery team. Tolerating diet, denies pain. Denies f/n/v/cp/sob.    MEDICATIONS  (STANDING):  atorvastatin 20 milliGRAM(s) Oral at bedtime  chlorhexidine 2% Cloths 1 Application(s) Topical daily  dextrose 5%. 1000 milliLiter(s) (100 mL/Hr) IV Continuous <Continuous>  dextrose 5%. 1000 milliLiter(s) (50 mL/Hr) IV Continuous <Continuous>  dextrose 50% Injectable 25 Gram(s) IV Push once  dextrose 50% Injectable 25 Gram(s) IV Push once  dextrose 50% Injectable 12.5 Gram(s) IV Push once  furosemide   Injectable 20 milliGRAM(s) IV Push once  glucagon  Injectable 1 milliGRAM(s) IntraMuscular once  insulin lispro (ADMELOG) corrective regimen sliding scale   SubCutaneous Before meals and at bedtime  lacosamide IVPB 100 milliGRAM(s) IV Intermittent every 12 hours  midodrine 5 milliGRAM(s) Oral every 8 hours  pantoprazole    Tablet 40 milliGRAM(s) Oral before breakfast  piperacillin/tazobactam IVPB.. 3.375 Gram(s) IV Intermittent every 8 hours  polyethylene glycol 3350 17 Gram(s) Oral daily  senna 2 Tablet(s) Oral at bedtime  sodium chloride 0.9%. 1000 milliLiter(s) (10 mL/Hr) IV Continuous <Continuous>  valproate sodium  IVPB 1000 milliGRAM(s) IV Intermittent every 12 hours    MEDICATIONS  (PRN):  acetaminophen     Tablet .. 650 milliGRAM(s) Oral every 6 hours PRN Mild Pain (1 - 3)  dextrose Oral Gel 15 Gram(s) Oral once PRN Blood Glucose LESS THAN 70 milliGRAM(s)/deciliter  oxyCODONE    IR 5 milliGRAM(s) Oral every 6 hours PRN Moderate Pain (4 - 6)  oxyCODONE    IR 10 milliGRAM(s) Oral every 6 hours PRN Severe Pain (7 - 10)      Vital Signs Last 24 Hrs  T(C): 36.7 (09 May 2023 05:01), Max: 37.1 (08 May 2023 13:58)  T(F): 98 (09 May 2023 05:01), Max: 98.8 (08 May 2023 13:58)  HR: 95 (09 May 2023 07:00) (93 - 125)  BP: 137/69 (08 May 2023 21:00) (137/69 - 137/69)  BP(mean): 95 (08 May 2023 21:00) (95 - 95)  RR: 18 (09 May 2023 07:00) (16 - 20)  SpO2: 96% (09 May 2023 07:00) (91% - 100%)    Parameters below as of 09 May 2023 08:00  Patient On (Oxygen Delivery Method): nasal cannula, high flow  O2 Flow (L/min): 50  O2 Concentration (%): 50    Physical Exam  General: NAD, resting comfortably in bed  Pulmonary: Nonlabored breathing, no respiratory distress  CV: NSR  Abd: soft, NT/ND, no guarding  Extremities: (-) edema, warm, well-perfused      I&O's Detail    08 May 2023 07:01  -  09 May 2023 07:00  --------------------------------------------------------  IN:    Oral Fluid: 240 mL  Total IN: 240 mL    OUT:    Voided (mL): 250 mL  Total OUT: 250 mL    Total NET: -10 mL          LABS:                        10.7   16.86 )-----------( 225      ( 09 May 2023 03:14 )             32.5     05-09    133<L>  |  101  |  21  ----------------------------<  100<H>  3.8   |  26  |  1.24    Ca    8.4      09 May 2023 03:14  Phos  3.7     05-09  Mg     2.4     05-09    TPro  5.7<L>  /  Alb  2.4<L>  /  TBili  0.4  /  DBili  x   /  AST  9<L>  /  ALT  <5<L>  /  AlkPhos  91  05-09    PT/INR - ( 09 May 2023 03:14 )   PT: 17.0 sec;   INR: 1.42          PTT - ( 09 May 2023 03:14 )  PTT:39.2 sec      RADIOLOGY & ADDITIONAL STUDIES:

## 2023-05-09 NOTE — PROGRESS NOTE ADULT - SUBJECTIVE AND OBJECTIVE BOX
CTICU  CRITICAL  CARE  attending     Hand off received 					   Pertinent clinical, laboratory, radiographic, hemodynamic, echocardiographic, respiratory data, microbiologic data and chart were reviewed and analyzed frequently throughout the course of the day and night        52 year old Male with HTN, type A aortic dissection s/p Dacron grafts, AV resuspension in 2013, CAD s/p CABG x 1 SVG to RCA (5/2013 with Dr. Medina), seizure disorder (last episode on 7/4/22)   He is a current every day marijuana user.   He was admitted for surgical management of progression of aneurysmal disease.   On 3/20/23 the patient underwent replacement of transverse aortic arch, second stage thoracic endovascular aortic repair, aorto-axillary bypass, AV replacement (bio 23mm),CABG x 1 (SVG-RCA).   Postoperative course complicated by lactic acidosis, severe cardiogenic and vasogenic shock, TREY requiring CVVHD and temporary dialysis requirement. Eventually discharged 4/14/23.   The patient presented to [facility] Emergency Room 4/27 for syncope vs seizure episode at home, falling backwards on the head.   Neurological workup performed during that visit revealed a negative EEG, but given clinical picture of seizures, he was started on vimpat and depakote.   Imaging preformed during that admission did no necessitate acute surgical intervention on endoleak.   He was readmitted to [facility] complaining of worsening epigastric pain.   CTAP revealed continued endoleak.   He was transferred to [facility] for further evaluation.    S/P Second stage TEVAR.       FAMILY HISTORY:  No pertinent family history in first degree relatives    PAST MEDICAL & SURGICAL HISTORY:  HTN (hypertension)  Aortic dissection  CAD (coronary artery disease)  Seizure disorder  S/P aortic bifurcation bypass graft  H/O  CABG x 1        14 system review was unremarkable    Vital signs, hemodynamic and respiratory parameters were reviewed from the bedside nursing flow sheet.  ICU Vital Signs Last 24 Hrs  T(C): 36.6 (09 May 2023 21:29), Max: 37.5 (09 May 2023 17:20)  T(F): 97.9 (09 May 2023 21:29), Max: 99.5 (09 May 2023 17:20)  HR: 71 (09 May 2023 23:00) (71 - 114)  BP: --  BP(mean): --  ABP: 138/66 (09 May 2023 23:00) (101/55 - 161/85)  ABP(mean): 91 (09 May 2023 23:00) (71 - 111)  RR: 18 (09 May 2023 23:00) (16 - 21)  SpO2: 100% (09 May 2023 23:00) (91% - 100%)    O2 Parameters below as of 09 May 2023 23:00  Patient On (Oxygen Delivery Method): nasal cannula, high flow  O2 Flow (L/min): 50  O2 Concentration (%): 50      Adult Advanced Hemodynamics Last 24 Hrs  CVP(mm Hg): 10 (09 May 2023 23:00) (2 - 12)  CVP(cm H2O): --  CO: 6.4 (09 May 2023 14:00) (6.4 - 6.4)  CI: 3.3 (09 May 2023 14:00) (3.3 - 3.3)  PA: 5/5 (09 May 2023 17:00) (5/5 - 33/16)  PA(mean): 5 (09 May 2023 17:00) (5 - 25)  PCWP: --  SVR: 1048 (09 May 2023 14:00) (1048 - 1048)  SVRI: 2033 (09 May 2023 14:00) (2033 - 2033)  PVR: --  PVRI: --, ABG - ( 09 May 2023 17:27 )  pH, Arterial: 7.42  pH, Blood: x     /  pCO2: 38    /  pO2: 164   / HCO3: 25    / Base Excess: 0.3   /  SaO2: 99.7                Intake and output was reviewed and the fluid balance was calculated  Daily     Daily   I&O's Summary    08 May 2023 07:01  -  09 May 2023 07:00  --------------------------------------------------------  IN: 240 mL / OUT: 250 mL / NET: -10 mL    09 May 2023 07:01  -  09 May 2023 23:37  --------------------------------------------------------  IN: 1660.9 mL / OUT: 1580 mL / NET: 80.9 mL            Neuro: No focal motor deficit.  Neck: No JVD.  CVS: S1, S2, No S3.  Lungs: Good air entry bilaterally.  Abd: Soft. No tenderness. + Bowel sounds.  Vascular: + DP/PT.  Extremities: No edema.  Lymphatic: Normal.  Skin: No abnormalities.      labs  CBC Full  -  ( 09 May 2023 17:19 )  WBC Count : 12.46 K/uL  RBC Count : 3.01 M/uL  Hemoglobin : 8.9 g/dL  Hematocrit : 27.3 %  Platelet Count - Automated : 183 K/uL  Mean Cell Volume : 90.7 fl  Mean Cell Hemoglobin : 29.6 pg  Mean Cell Hemoglobin Concentration : 32.6 gm/dL  Auto Neutrophil # : x  Auto Lymphocyte # : x  Auto Monocyte # : x  Auto Eosinophil # : x  Auto Basophil # : x  Auto Neutrophil % : x  Auto Lymphocyte % : x  Auto Monocyte % : x  Auto Eosinophil % : x  Auto Basophil % : x    05-09    135  |  100  |  19  ----------------------------<  89  3.8   |  26  |  1.05    Ca    7.5<L>      09 May 2023 17:19  Phos  3.7     05-09  Mg     2.2     05-09    TPro  5.2<L>  /  Alb  2.6<L>  /  TBili  0.5  /  DBili  x   /  AST  9<L>  /  ALT  <5<L>  /  AlkPhos  66  05-09    PT/INR - ( 09 May 2023 18:05 )   PT: 16.5 sec;   INR: 1.38          PTT - ( 09 May 2023 18:05 )  PTT:38.5 sec  The current medications were reviewed   MEDICATIONS  (STANDING):  atorvastatin 20 milliGRAM(s) Oral at bedtime  chlorhexidine 2% Cloths 1 Application(s) Topical daily  dexMEDEtomidine Infusion 0.7 MICROgram(s)/kG/Hr (12.3 mL/Hr) IV Continuous <Continuous>  dextrose 5%. 1000 milliLiter(s) (50 mL/Hr) IV Continuous <Continuous>  dextrose 5%. 1000 milliLiter(s) (100 mL/Hr) IV Continuous <Continuous>  dextrose 50% Injectable 25 Gram(s) IV Push once  dextrose 50% Injectable 12.5 Gram(s) IV Push once  dextrose 50% Injectable 25 Gram(s) IV Push once  fat emulsion (Fish Oil and Plant Based) 20% Infusion 0.7142 Gm/kG/Day (20.83 mL/Hr) IV Continuous <Continuous>  glucagon  Injectable 1 milliGRAM(s) IntraMuscular once  insulin lispro (ADMELOG) corrective regimen sliding scale   SubCutaneous Before meals and at bedtime  lacosamide IVPB 100 milliGRAM(s) IV Intermittent every 12 hours  midodrine 5 milliGRAM(s) Oral every 8 hours  pantoprazole    Tablet 40 milliGRAM(s) Oral before breakfast  Parenteral Nutrition - Adult 1 Each (50 mL/Hr) TPN Continuous <Continuous>  piperacillin/tazobactam IVPB.. 3.375 Gram(s) IV Intermittent every 8 hours  polyethylene glycol 3350 17 Gram(s) Oral daily  senna 2 Tablet(s) Oral at bedtime  sodium chloride 0.9%. 1000 milliLiter(s) (10 mL/Hr) IV Continuous <Continuous>  valproate sodium  IVPB 1000 milliGRAM(s) IV Intermittent every 12 hours    MEDICATIONS  (PRN):  acetaminophen     Tablet .. 650 milliGRAM(s) Oral every 6 hours PRN Mild Pain (1 - 3)  dextrose Oral Gel 15 Gram(s) Oral once PRN Blood Glucose LESS THAN 70 milliGRAM(s)/deciliter  oxyCODONE    IR 5 milliGRAM(s) Oral every 6 hours PRN Moderate Pain (4 - 6)  oxyCODONE    IR 10 milliGRAM(s) Oral every 6 hours PRN Severe Pain (7 - 10)          53 year old  Male admitted with abdominal pain due to endoleak.  S/P Second Stage TEVAR.  S/P Right pigtail insertion for pneumothorax.  Subclinical pancreatitis.   Hemodynamically stable.  Good oxygenation.  Fair urine out put.      My plan includes :  D/C TPN  Increase PO intake.  Antiseizure Rx.  IV antibiotic Rx.  Statin and Betablocker.  Close hemodynamic monitoring   Monitor for arrhythmias and monitor parameters for organ perfusion  Monitor neurologic status  Monitor renal function.  Head of the bed should remain elevated to 45 deg .   Chest PT and IS will be encouraged  Monitor adequacy of oxygenation.  Nutritional goals will be met using po eventually , ensure adequate caloric intake and monitor the same  Stress ulcer and VTE prophylaxis will be achieved    Glycemic control is satisfactory  Electrolytes have been repleted as necessary and wound care has been carried out. Pain control has been achieved.   Aggressive physical therapy and early mobility and ambulation goals will be met   The family was updated about the course and plan  CRITICAL CARE TIME SPENT in evaluation and management, review and interpretation of labs and x-rays, hemodynamic management, formulating a plan and coordinating care: ___30____ MIN.  Time does not include procedural time.  CTICU ATTENDING     					    José Miguel Torres MD

## 2023-05-09 NOTE — CONSULT NOTE ADULT - SUBJECTIVE AND OBJECTIVE BOX
Surgery Consult Note    HPI:    54yo Male pt with PMH HTN, type A aortic dissection s/p Dacron grafts, AV resuspension in 2013, CAD s/p CABG x 1 SVG to RCA (5/2013 with Dr. Medina), seizure disorder (last episode on 7/4/22) who was admitted for surgical mgmt of progression of aneurysmal disease. Recent admission 3/20-4/14 during which patient underwent replacement of transverse aortic arch, second stage thoracic endovascular aortic repair, aorto-axillary bypass, AV replacement (bio 23mm), and CABG x 1 (SVG-RCA) EF 75% (Ignacio, 3/20). Transferred from OSH to CT surgery on 5/5 for 2nd stage TEVAR. Now s/p 2nd state TEVAR on 5/5. General surgery consulted for finding of acute pancreatitis with large peripancreatic/perigastric fluid collections on CTA abdomen on 5/8 in the setting of increasing leukocytosis. On examination, pt states that prior to presenting to [facility] on 5/4, he had x1 day of severe upper abdominal pain associated with nausea which prompted him to present to the ED. States that by the time he got to the ED, his pain had resolved. States that he had never had similar pain in the past. Denies history of ETOH abuse, gallstones, prior ERCP, or thiazide use.     Denies family hx of IBS, Crohn's, UC, or colon cancer.    Pancreatic task force consulted for acute pancreatitis with peripancreatic/perigastric fluid collections    Pt afebrile, tachycardic to 110s, normotensive (on midodrine), and satting on RA. Labs significant for WBC 19.15 (10.2 on admission), Hb 10.0, TBili 0.3, AST 8, ALT <5, and Lipase 93 (1145 on admission). CTA abdomen/pelvis demonstrates diffuse pancreatic enlargement and heterogenous attenuation predominantly in body and tail, with multiple intrapancreatic and peripancreatic fluid collections with well-defined enhancing wall, new since November 30, 2022, not significantly changed since May 5, 2023 upon directed review. Largest fluid collection along stomach greater curvature 8.5 x 2 x 2 cm, communicates with inferior fluid collection measuring 5 x 3 x 3 cm. Main pancreatic duct at head/uncinate appears mildly dilated up to 5 mm and is poorly visualized at pancreatic neck body and tail.    PMH: HTN, type A aortic dissection, CAD, seizure disorder,   PSx: Dacron graft, CABG x1, SVG to RCA, transverse aortic arch repalcement, second stage TEVAR, aorto-axillary bypass, AV replacement  Social Hx: No tobacco, daily marijuana, minimal Etoh, no illicits  Family Hx: noncontributory      PAST MEDICAL & SURGICAL HISTORY:  HTN (hypertension)      Aortic dissection      CAD (coronary artery disease)      Seizure disorder      S/P aortic bifurcation bypass graft      S/P CABG x 1          MEDICATIONS  (STANDING):  atorvastatin 20 milliGRAM(s) Oral at bedtime  chlorhexidine 2% Cloths 1 Application(s) Topical daily  dextrose 5%. 1000 milliLiter(s) (100 mL/Hr) IV Continuous <Continuous>  dextrose 5%. 1000 milliLiter(s) (50 mL/Hr) IV Continuous <Continuous>  dextrose 50% Injectable 25 Gram(s) IV Push once  dextrose 50% Injectable 12.5 Gram(s) IV Push once  dextrose 50% Injectable 25 Gram(s) IV Push once  glucagon  Injectable 1 milliGRAM(s) IntraMuscular once  insulin lispro (ADMELOG) corrective regimen sliding scale   SubCutaneous Before meals and at bedtime  lacosamide IVPB 100 milliGRAM(s) IV Intermittent every 12 hours  midodrine 5 milliGRAM(s) Oral every 8 hours  pantoprazole    Tablet 40 milliGRAM(s) Oral before breakfast  piperacillin/tazobactam IVPB.. 3.375 Gram(s) IV Intermittent every 8 hours  polyethylene glycol 3350 17 Gram(s) Oral daily  senna 2 Tablet(s) Oral at bedtime  sodium chloride 0.9%. 1000 milliLiter(s) (10 mL/Hr) IV Continuous <Continuous>  valproate sodium  IVPB 1000 milliGRAM(s) IV Intermittent every 12 hours    MEDICATIONS  (PRN):  acetaminophen     Tablet .. 650 milliGRAM(s) Oral every 6 hours PRN Mild Pain (1 - 3)  dextrose Oral Gel 15 Gram(s) Oral once PRN Blood Glucose LESS THAN 70 milliGRAM(s)/deciliter  oxyCODONE    IR 5 milliGRAM(s) Oral every 6 hours PRN Moderate Pain (4 - 6)  oxyCODONE    IR 10 milliGRAM(s) Oral every 6 hours PRN Severe Pain (7 - 10)      Allergies    No Known Allergies    Intolerances        SOCIAL HISTORY:    FAMILY HISTORY:  No pertinent family history in first degree relatives        Vital Signs Last 24 Hrs  T(C): 36.7 (09 May 2023 05:01), Max: 37.1 (08 May 2023 13:58)  T(F): 98 (09 May 2023 05:01), Max: 98.8 (08 May 2023 13:58)  HR: 96 (09 May 2023 08:00) (93 - 125)  BP: 137/69 (08 May 2023 21:00) (137/69 - 137/69)  BP(mean): 95 (08 May 2023 21:00) (95 - 95)  RR: 18 (09 May 2023 07:00) (16 - 20)  SpO2: 100% (09 May 2023 08:00) (91% - 100%)    Parameters below as of 09 May 2023 08:00  Patient On (Oxygen Delivery Method): nasal cannula, high flow  O2 Flow (L/min): 50  O2 Concentration (%): 50    I&O's Summary    08 May 2023 07:01  -  09 May 2023 07:00  --------------------------------------------------------  IN: 240 mL / OUT: 250 mL / NET: -10 mL        Physical Exam:  General: NAD, resting comfortably in bed  Pulmonary: Nonlabored breathing, no respiratory distress  CV: NSR  Abd: soft, NT/ND, no guarding  Extremities: (-) edema, warm, well-perfused  Lines/drains/tubes:    LABS:                        10.7   16.86 )-----------( 225      ( 09 May 2023 03:14 )             32.5     05-09    133<L>  |  101  |  21  ----------------------------<  100<H>  3.8   |  26  |  1.24    Ca    8.4      09 May 2023 03:14  Phos  3.7     05-09  Mg     2.4     05-09    TPro  5.7<L>  /  Alb  2.4<L>  /  TBili  0.4  /  DBili  x   /  AST  9<L>  /  ALT  <5<L>  /  AlkPhos  91  05-09    PT/INR - ( 09 May 2023 03:14 )   PT: 17.0 sec;   INR: 1.42          PTT - ( 09 May 2023 03:14 )  PTT:39.2 sec    CAPILLARY BLOOD GLUCOSE      POCT Blood Glucose.: 76 mg/dL (09 May 2023 07:26)  POCT Blood Glucose.: 59 mg/dL (09 May 2023 07:11)  POCT Blood Glucose.: 144 mg/dL (08 May 2023 21:19)  POCT Blood Glucose.: 82 mg/dL (08 May 2023 17:03)  POCT Blood Glucose.: 86 mg/dL (08 May 2023 10:38)    LIVER FUNCTIONS - ( 09 May 2023 03:14 )  Alb: 2.4 g/dL / Pro: 5.7 g/dL / ALK PHOS: 91 U/L / ALT: <5 U/L / AST: 9 U/L / GGT: x             Cultures:      RADIOLOGY & ADDITIONAL STUDIES:

## 2023-05-09 NOTE — CHART NOTE - NSCHARTNOTEFT_GEN_A_CORE
U/S done this am revealing large right side pleural effusion and moderate left side pleural effusion.  Good windows on both sides R>L.  Will place pigtail on right side to start, possible left side.      Exam:  US Chest    Procedure Date: 5/9/23     History: 53y Male whose CXR today shows a possible B/L sided pleural effusion.      Findings:                    Evaluation of the B/L sides of the thoracic cavity demonstrates large right side pleural effusion and moderate left pleural effusion- left side appears more loculated.                    Pigtail on right.  Possibly left either later or tomorrow.

## 2023-05-09 NOTE — PROGRESS NOTE ADULT - SUBJECTIVE AND OBJECTIVE BOX
CTICU  CRITICAL  CARE  attending     Hand off received 					   Pertinent clinical, laboratory, radiographic, hemodynamic, echocardiographic, respiratory data, microbiologic data and chart were reviewed and analyzed frequently throughout the course of the day and night  Patient seen and examined with CTS/ SH attending at bedside  Pt is a 53y , Male, HEALTH ISSUES - PROBLEM Dx:      , FAMILY HISTORY:  No pertinent family history in first degree relatives    PAST MEDICAL & SURGICAL HISTORY:  HTN (hypertension)      Aortic dissection      CAD (coronary artery disease)      Seizure disorder      S/P aortic bifurcation bypass graft      S/P CABG x 1        Patient is a 53y old  Male who presents with a chief complaint of endoleak, abdominal pain (09 May 2023 12:18)      14 system review was unremarkable    Vital signs, hemodynamic and respiratory parameters were reviewed from the bedside nursing flowsheet.  ICU Vital Signs Last 24 Hrs  T(C): 36.6 (09 May 2023 21:29), Max: 37.5 (09 May 2023 17:20)  T(F): 97.9 (09 May 2023 21:29), Max: 99.5 (09 May 2023 17:20)  HR: 71 (09 May 2023 23:00) (71 - 114)  BP: --  BP(mean): --  ABP: 138/66 (09 May 2023 23:00) (101/55 - 161/85)  ABP(mean): 91 (09 May 2023 23:00) (71 - 111)  RR: 18 (09 May 2023 23:00) (16 - 21)  SpO2: 100% (09 May 2023 23:00) (91% - 100%)    O2 Parameters below as of 09 May 2023 23:00  Patient On (Oxygen Delivery Method): nasal cannula, high flow  O2 Flow (L/min): 50  O2 Concentration (%): 50      Adult Advanced Hemodynamics Last 24 Hrs  CVP(mm Hg): 10 (09 May 2023 23:00) (2 - 12)  CVP(cm H2O): --  CO: 6.4 (09 May 2023 14:00) (6.4 - 6.4)  CI: 3.3 (09 May 2023 14:00) (3.3 - 3.3)  PA: 5/5 (09 May 2023 17:00) (5/5 - 33/16)  PA(mean): 5 (09 May 2023 17:00) (5 - 25)  PCWP: --  SVR: 1048 (09 May 2023 14:00) (1048 - 1048)  SVRI: 2033 (09 May 2023 14:00) (2033 - 2033)  PVR: --  PVRI: --, ABG - ( 09 May 2023 17:27 )  pH, Arterial: 7.42  pH, Blood: x     /  pCO2: 38    /  pO2: 164   / HCO3: 25    / Base Excess: 0.3   /  SaO2: 99.7                Intake and output was reviewed and the fluid balance was calculated  Daily     Daily   I&O's Summary    08 May 2023 07:01  -  09 May 2023 07:00  --------------------------------------------------------  IN: 240 mL / OUT: 250 mL / NET: -10 mL    09 May 2023 07:01  -  09 May 2023 23:36  --------------------------------------------------------  IN: 1660.9 mL / OUT: 1580 mL / NET: 80.9 mL        All lines and drain sites were assessed  Glycemic trend was reviewedCAPILLARY BLOOD GLUCOSE      POCT Blood Glucose.: 84 mg/dL (09 May 2023 22:06)    No acute change in mental status  Auscultation of the chest reveals equal bs  Abdomen is soft  Extremities are warm and well perfused  Wounds appear clean and unremarkable  Antibiotics are periop    labs  CBC Full  -  ( 09 May 2023 17:19 )  WBC Count : 12.46 K/uL  RBC Count : 3.01 M/uL  Hemoglobin : 8.9 g/dL  Hematocrit : 27.3 %  Platelet Count - Automated : 183 K/uL  Mean Cell Volume : 90.7 fl  Mean Cell Hemoglobin : 29.6 pg  Mean Cell Hemoglobin Concentration : 32.6 gm/dL  Auto Neutrophil # : x  Auto Lymphocyte # : x  Auto Monocyte # : x  Auto Eosinophil # : x  Auto Basophil # : x  Auto Neutrophil % : x  Auto Lymphocyte % : x  Auto Monocyte % : x  Auto Eosinophil % : x  Auto Basophil % : x    05-09    135  |  100  |  19  ----------------------------<  89  3.8   |  26  |  1.05    Ca    7.5<L>      09 May 2023 17:19  Phos  3.7     05-09  Mg     2.2     05-09    TPro  5.2<L>  /  Alb  2.6<L>  /  TBili  0.5  /  DBili  x   /  AST  9<L>  /  ALT  <5<L>  /  AlkPhos  66  05-09    PT/INR - ( 09 May 2023 18:05 )   PT: 16.5 sec;   INR: 1.38          PTT - ( 09 May 2023 18:05 )  PTT:38.5 sec  The current medications were reviewed   MEDICATIONS  (STANDING):  atorvastatin 20 milliGRAM(s) Oral at bedtime  chlorhexidine 2% Cloths 1 Application(s) Topical daily  dexMEDEtomidine Infusion 0.7 MICROgram(s)/kG/Hr (12.3 mL/Hr) IV Continuous <Continuous>  dextrose 5%. 1000 milliLiter(s) (100 mL/Hr) IV Continuous <Continuous>  dextrose 5%. 1000 milliLiter(s) (50 mL/Hr) IV Continuous <Continuous>  dextrose 50% Injectable 25 Gram(s) IV Push once  dextrose 50% Injectable 25 Gram(s) IV Push once  dextrose 50% Injectable 12.5 Gram(s) IV Push once  fat emulsion (Fish Oil and Plant Based) 20% Infusion 0.7142 Gm/kG/Day (20.83 mL/Hr) IV Continuous <Continuous>  glucagon  Injectable 1 milliGRAM(s) IntraMuscular once  insulin lispro (ADMELOG) corrective regimen sliding scale   SubCutaneous Before meals and at bedtime  lacosamide IVPB 100 milliGRAM(s) IV Intermittent every 12 hours  midodrine 5 milliGRAM(s) Oral every 8 hours  pantoprazole    Tablet 40 milliGRAM(s) Oral before breakfast  Parenteral Nutrition - Adult 1 Each (50 mL/Hr) TPN Continuous <Continuous>  piperacillin/tazobactam IVPB.. 3.375 Gram(s) IV Intermittent every 8 hours  polyethylene glycol 3350 17 Gram(s) Oral daily  senna 2 Tablet(s) Oral at bedtime  sodium chloride 0.9%. 1000 milliLiter(s) (10 mL/Hr) IV Continuous <Continuous>  valproate sodium  IVPB 1000 milliGRAM(s) IV Intermittent every 12 hours    MEDICATIONS  (PRN):  acetaminophen     Tablet .. 650 milliGRAM(s) Oral every 6 hours PRN Mild Pain (1 - 3)  dextrose Oral Gel 15 Gram(s) Oral once PRN Blood Glucose LESS THAN 70 milliGRAM(s)/deciliter  oxyCODONE    IR 5 milliGRAM(s) Oral every 6 hours PRN Moderate Pain (4 - 6)  oxyCODONE    IR 10 milliGRAM(s) Oral every 6 hours PRN Severe Pain (7 - 10)       PROBLEM LIST/ ASSESSMENT:  HEALTH ISSUES - PROBLEM Dx:      ,   Patient is a 53y old  Male who presents with a chief complaint of endoleak, abdominal pain (09 May 2023 12:18)     s/p cardiac surgery      Vasogenic shock due to hypotension in the cticu , will keep on pressors    Hypovolemic shock - > 20% intravascular depletion will replete volume    Acute blood loss anemia with relative hypotension treated with > 1 unit PC    Acute respiratory failure ruled in due to hypoxemia, O2 sats < 91% on RA treated with HFNC    Toxic metabolic encephalopathy ; sundowning due to anesthesia pain medications    Acidosis evidenced by anion gap and negative base excess          Moderate protein calorie malutrition        My plan includes :    will place cvl for access  placed under conscious seation    gi nd surg evals appreciated - re pancreas    tolerating po  will encourage ensure supplements    use tpn tonight for cals   close hemodynamic, ventilatory and drain monitoring and management per post op routine    Monitor for arrhythmias and monitor parameters for organ perfusion  beta blockade not administered due to hemodynamic instability and bradycardia  monitor neurologic status  Head of the bed should remain elevated to 45 deg .   chest PT and IS will be encouraged  monitor adequacy of oxygenation and ventilation and attempt to wean oxygen  antibiotic regimen will be tailored to the clinical, laboratory and microbiologic data  Nutritional goals will be met using po eventually , ensure adequate caloric intake and montior the same  Stress ulcer and VTE prophylaxis will be achieved    Glycemic control is satisfactory  Electrolytes have been repleted as necessary and wound care has been carried out. Pain control has been achieved.   agressive physical therapy and early mobility and ambulation goals will be met   The family was updated about the course and plan  CRITICAL CARE TIME Upon my evaluation, this patient had a high probability of imminent or life-threatening deterioration due to the above problems which required my direct attention, intervention, and personal management.  I have personally provided 110 minutes of critical care time exclusive of time spent on separately billable procedures. Time included review of laboratory data, radiology results, discussion with consultants, and monitoring for potential decompensation. Interventions were performed as documented abovepersonally provided by me  in evaluation and management, reassessments, review and interpretation of labs and x-rays, ventilator and hemodynamic management, formulating a plan and coordinating care: ___110___ MIN.  Time does not include procedural time.    CTICU ATTENDING     					    Bubba Craft MD

## 2023-05-09 NOTE — ADVANCED PRACTICE NURSE CONSULT - ASSESSMENT
Pt is a 53y , Male, post op day # 4 s/p 2nd stage TEVAR. Healing superficial wound to left patella with granulation tissue visible, minimal serosanguinous drainage to old dressing. Pt known from previous visit, knee wound is healing well.

## 2023-05-09 NOTE — CONSULT NOTE ADULT - ASSESSMENT
53yr old male with PMH daily marijuana use, HTN, type A dissection s/p Dacron grafts and AV resuspension (2013), CAD sp CABG x 1 (Adam, 5/2013, SVG-RCA), seizure disorder, and PSx of transverse aortic arch, second stage thoracic endovascular aortic repair, aorto-axillary bypass, AV replacement (bio 23mm), and CABG x 1 (SVG-RCA) EF 75% (Ignacio, 3/20). Presented to [place] ED 5/4 for epigastric pain. CT exam which showed known endoleak for which pt was tx to [facility]. Patient underwent TEVAR with Dr. Pierre 5/5, LD placed prior by NSYAMILET. Pancreatitis task force consulted on 5/9 with finding of diffuse pancreatic enlargement and heterogenous attenuation predominantly in body and tail, with multiple intrapancreatic and peripancreatic fluid collections with well-defined enhancing wall, new since November 30, 2022, not significantly changed since May 5, 2023 on CTA abdomen in the setting of uptrending leukocytosis. Pt afebrile since admission, tachycardic to 110s, normotensive on midodrine 5mg q8, and satting well on RA. Subjectively reports feeling well without abdominal pain, nausea, or vomiting since admission. Reports passing flatus and is tolerating a DASH diet. Subjective history, physical exam, laboratory data, and imaging suggestive of resolving pancreatitis with peripancreatic/perigastric collections (not necrotizing pancreatitis). WBC is improving 16.86 (19.15).     - No acute surgical intervention indicated at this time.   - RUQ u/s, MRCP  - f/u GI consult  -Rest of care per primary team  - Surgery Team 1C will continue to follow. Please page Team 1 with questions/clinical changes. [phone] 53yr old male with PMH daily marijuana use, HTN, type A dissection s/p Dacron grafts and AV resuspension (2013), CAD sp CABG x 1 (Adam, 5/2013, SVG-RCA), seizure disorder, and PSx of transverse aortic arch, second stage thoracic endovascular aortic repair, aorto-axillary bypass, AV replacement (bio 23mm), and CABG x 1 (SVG-RCA) EF 75% (Ignacio, 3/20). Presented to [place] ED 5/4 for epigastric pain. CT exam which showed known endoleak for which pt was tx to [facility]. Patient underwent TEVAR with Dr. Pierre 5/5, LD placed prior by NSYAMILET. Pancreatitis task force consulted on 5/9 with finding of diffuse pancreatic enlargement and heterogenous attenuation predominantly in body and tail, with multiple intrapancreatic and peripancreatic fluid collections with well-defined enhancing wall, new since November 30, 2022, not significantly changed since May 5, 2023 on CTA abdomen in the setting of uptrending leukocytosis. Pt afebrile since admission, tachycardic to 110s, normotensive on midodrine 5mg q8, and satting well on RA. Subjectively reports feeling well without abdominal pain, nausea, or vomiting since admission. Reports passing flatus and is tolerating a DASH diet. Subjective history, physical exam, laboratory data, and imaging suggestive of resolving pancreatitis with peripancreatic/perigastric collections (not necrotizing pancreatitis). WBC is improving 16.86 (19.15).     - No acute surgical intervention indicated at this time.   - RUQ u/s, MRCP  - f/u GI consult  - no need for abx given exam, vitals and reassuring labs   -Rest of care per primary team  - Surgery Team 1C will continue to follow. Please page Team 1 with questions/clinical changes. [phone]

## 2023-05-09 NOTE — CONSULT NOTE ADULT - ASSESSMENT
PENDING 51 YO Male, current every day marijuana use, w/ PMHx of HTN, type A aortic dissection s/p Dacron grafts, AV resuspension in 2013, CAD s/p CABG x 1 SVG to RCA (5/2013 with Dr. Medina), seizure disorder (last episode on 7/4/22) who was admitted for surgical mgmt of progression of aneurysmal disease.     PENDING 53 YO Male, current every day marijuana use, w/ PMHx of HTN, type A aortic dissection s/p Dacron grafts, AV resuspension in 2013, CAD s/p CABG x 1 SVG to RCA (5/2013 with Dr. Medina), seizure disorder (last episode on 7/4/22) s/p TEVAR with Dr. Pierre 5/5 with post op leukocytosis and CT chest abd pelvis noting diffuse pancreatic enlargement and heterogenous attenuation predominantly in body and tail, with multiple intrapancreatic and peripancreatic fluid collections with well-defined enhancing wall, the largest fluid collection along stomach greater curvature 8.5 x 2 x 2 cm, communicates with inferior fluid collection measuring 5 x 3 x 3 cm.      No clinical abd pain, though lipase and imaging findings c/w acute pancreatitis.  Clinical hx not supportive of ETOH as cause.  Imaging also does not support biliary etiology.  Trigs nml.  Favor ischemic pancreatitis given recent surgical intervention which likely explains development of pancreatic fluid collections with a low suspicion for infection at this time.    Recommendations:  -Advance PO diet as tolerated if no contraindications from CT surgery  -IVF per ICU team  -Continue to monitor for now in absence of sx associated with panc fluid collctions. Drainage in future may be appropriate pending clinical course  -Chemical VTE prophy if no contraindications    Ismael Potts, DO  Gastroenterology Fellow  Pager: [phone]  After 5PM or on weekends, please contact Eric Hill  for fellow on call [phone]

## 2023-05-09 NOTE — ADVANCED PRACTICE NURSE CONSULT - RECOMMEDATIONS
Recommend Triad ointment to site every other day, cover with foam dressing. Spoke with KUN Ya and house staff.

## 2023-05-10 LAB
ALBUMIN SERPL ELPH-MCNC: 2.2 G/DL — LOW (ref 3.3–5)
ALBUMIN SERPL ELPH-MCNC: 2.3 G/DL — LOW (ref 3.3–5)
ALBUMIN SERPL ELPH-MCNC: 2.4 G/DL — LOW (ref 3.3–5)
ALP SERPL-CCNC: 65 U/L — SIGNIFICANT CHANGE UP (ref 40–120)
ALP SERPL-CCNC: 67 U/L — SIGNIFICANT CHANGE UP (ref 40–120)
ALP SERPL-CCNC: 76 U/L — SIGNIFICANT CHANGE UP (ref 40–120)
ALT FLD-CCNC: <5 U/L — LOW (ref 10–45)
ANION GAP SERPL CALC-SCNC: 4 MMOL/L — LOW (ref 5–17)
ANION GAP SERPL CALC-SCNC: 6 MMOL/L — SIGNIFICANT CHANGE UP (ref 5–17)
ANION GAP SERPL CALC-SCNC: 8 MMOL/L — SIGNIFICANT CHANGE UP (ref 5–17)
APTT BLD: 37.2 SEC — HIGH (ref 27.5–35.5)
APTT BLD: 39.1 SEC — HIGH (ref 27.5–35.5)
APTT BLD: 39.8 SEC — HIGH (ref 27.5–35.5)
AST SERPL-CCNC: 11 U/L — SIGNIFICANT CHANGE UP (ref 10–40)
AST SERPL-CCNC: 8 U/L — LOW (ref 10–40)
AST SERPL-CCNC: 9 U/L — LOW (ref 10–40)
BILIRUB SERPL-MCNC: 0.3 MG/DL — SIGNIFICANT CHANGE UP (ref 0.2–1.2)
BILIRUB SERPL-MCNC: 0.4 MG/DL — SIGNIFICANT CHANGE UP (ref 0.2–1.2)
BILIRUB SERPL-MCNC: 0.4 MG/DL — SIGNIFICANT CHANGE UP (ref 0.2–1.2)
BUN SERPL-MCNC: 18 MG/DL — SIGNIFICANT CHANGE UP (ref 7–23)
BUN SERPL-MCNC: 18 MG/DL — SIGNIFICANT CHANGE UP (ref 7–23)
BUN SERPL-MCNC: 21 MG/DL — SIGNIFICANT CHANGE UP (ref 7–23)
CALCIUM SERPL-MCNC: 7.9 MG/DL — LOW (ref 8.4–10.5)
CALCIUM SERPL-MCNC: 8 MG/DL — LOW (ref 8.4–10.5)
CALCIUM SERPL-MCNC: 8.1 MG/DL — LOW (ref 8.4–10.5)
CHLORIDE SERPL-SCNC: 101 MMOL/L — SIGNIFICANT CHANGE UP (ref 96–108)
CHLORIDE SERPL-SCNC: 104 MMOL/L — SIGNIFICANT CHANGE UP (ref 96–108)
CHLORIDE SERPL-SCNC: 104 MMOL/L — SIGNIFICANT CHANGE UP (ref 96–108)
CO2 SERPL-SCNC: 25 MMOL/L — SIGNIFICANT CHANGE UP (ref 22–31)
CO2 SERPL-SCNC: 26 MMOL/L — SIGNIFICANT CHANGE UP (ref 22–31)
CO2 SERPL-SCNC: 27 MMOL/L — SIGNIFICANT CHANGE UP (ref 22–31)
CORTIS AM PEAK SERPL-MCNC: 14.85 UG/DL — SIGNIFICANT CHANGE UP (ref 6.02–18.4)
CREAT SERPL-MCNC: 0.9 MG/DL — SIGNIFICANT CHANGE UP (ref 0.5–1.3)
CREAT SERPL-MCNC: 0.97 MG/DL — SIGNIFICANT CHANGE UP (ref 0.5–1.3)
CREAT SERPL-MCNC: 0.98 MG/DL — SIGNIFICANT CHANGE UP (ref 0.5–1.3)
EGFR: 101 ML/MIN/1.73M2 — SIGNIFICANT CHANGE UP
EGFR: 92 ML/MIN/1.73M2 — SIGNIFICANT CHANGE UP
EGFR: 93 ML/MIN/1.73M2 — SIGNIFICANT CHANGE UP
GAS PNL BLDA: SIGNIFICANT CHANGE UP
GAS PNL BLDA: SIGNIFICANT CHANGE UP
GLUCOSE BLDC GLUCOMTR-MCNC: 105 MG/DL — HIGH (ref 70–99)
GLUCOSE BLDC GLUCOMTR-MCNC: 111 MG/DL — HIGH (ref 70–99)
GLUCOSE BLDC GLUCOMTR-MCNC: 113 MG/DL — HIGH (ref 70–99)
GLUCOSE BLDC GLUCOMTR-MCNC: 162 MG/DL — HIGH (ref 70–99)
GLUCOSE SERPL-MCNC: 103 MG/DL — HIGH (ref 70–99)
GLUCOSE SERPL-MCNC: 122 MG/DL — HIGH (ref 70–99)
GLUCOSE SERPL-MCNC: 129 MG/DL — HIGH (ref 70–99)
HCT VFR BLD CALC: 28.7 % — LOW (ref 39–50)
HCT VFR BLD CALC: 29.5 % — LOW (ref 39–50)
HCT VFR BLD CALC: 32.7 % — LOW (ref 39–50)
HGB BLD-MCNC: 10.8 G/DL — LOW (ref 13–17)
HGB BLD-MCNC: 9.4 G/DL — LOW (ref 13–17)
HGB BLD-MCNC: 9.5 G/DL — LOW (ref 13–17)
INR BLD: 1.25 — HIGH (ref 0.88–1.16)
INR BLD: 1.32 — HIGH (ref 0.88–1.16)
INR BLD: 1.38 — HIGH (ref 0.88–1.16)
MAGNESIUM SERPL-MCNC: 2 MG/DL — SIGNIFICANT CHANGE UP (ref 1.6–2.6)
MAGNESIUM SERPL-MCNC: 2.1 MG/DL — SIGNIFICANT CHANGE UP (ref 1.6–2.6)
MAGNESIUM SERPL-MCNC: 2.3 MG/DL — SIGNIFICANT CHANGE UP (ref 1.6–2.6)
MCHC RBC-ENTMCNC: 29.6 PG — SIGNIFICANT CHANGE UP (ref 27–34)
MCHC RBC-ENTMCNC: 30 PG — SIGNIFICANT CHANGE UP (ref 27–34)
MCHC RBC-ENTMCNC: 30.1 PG — SIGNIFICANT CHANGE UP (ref 27–34)
MCHC RBC-ENTMCNC: 32.2 GM/DL — SIGNIFICANT CHANGE UP (ref 32–36)
MCHC RBC-ENTMCNC: 32.8 GM/DL — SIGNIFICANT CHANGE UP (ref 32–36)
MCHC RBC-ENTMCNC: 33 GM/DL — SIGNIFICANT CHANGE UP (ref 32–36)
MCV RBC AUTO: 91.1 FL — SIGNIFICANT CHANGE UP (ref 80–100)
MCV RBC AUTO: 91.7 FL — SIGNIFICANT CHANGE UP (ref 80–100)
MCV RBC AUTO: 91.9 FL — SIGNIFICANT CHANGE UP (ref 80–100)
NRBC # BLD: 0 /100 WBCS — SIGNIFICANT CHANGE UP (ref 0–0)
PHOSPHATE SERPL-MCNC: 2.7 MG/DL — SIGNIFICANT CHANGE UP (ref 2.5–4.5)
PHOSPHATE SERPL-MCNC: 2.9 MG/DL — SIGNIFICANT CHANGE UP (ref 2.5–4.5)
PHOSPHATE SERPL-MCNC: 3.3 MG/DL — SIGNIFICANT CHANGE UP (ref 2.5–4.5)
PLATELET # BLD AUTO: 141 K/UL — LOW (ref 150–400)
PLATELET # BLD AUTO: 143 K/UL — LOW (ref 150–400)
PLATELET # BLD AUTO: 169 K/UL — SIGNIFICANT CHANGE UP (ref 150–400)
POTASSIUM SERPL-MCNC: 4.2 MMOL/L — SIGNIFICANT CHANGE UP (ref 3.5–5.3)
POTASSIUM SERPL-MCNC: 4.3 MMOL/L — SIGNIFICANT CHANGE UP (ref 3.5–5.3)
POTASSIUM SERPL-MCNC: 4.6 MMOL/L — SIGNIFICANT CHANGE UP (ref 3.5–5.3)
POTASSIUM SERPL-SCNC: 4.2 MMOL/L — SIGNIFICANT CHANGE UP (ref 3.5–5.3)
POTASSIUM SERPL-SCNC: 4.3 MMOL/L — SIGNIFICANT CHANGE UP (ref 3.5–5.3)
POTASSIUM SERPL-SCNC: 4.6 MMOL/L — SIGNIFICANT CHANGE UP (ref 3.5–5.3)
PROT SERPL-MCNC: 5.1 G/DL — LOW (ref 6–8.3)
PROT SERPL-MCNC: 5.1 G/DL — LOW (ref 6–8.3)
PROT SERPL-MCNC: 5.8 G/DL — LOW (ref 6–8.3)
PROTHROM AB SERPL-ACNC: 14.9 SEC — HIGH (ref 10.5–13.4)
PROTHROM AB SERPL-ACNC: 15.8 SEC — HIGH (ref 10.5–13.4)
PROTHROM AB SERPL-ACNC: 16.5 SEC — HIGH (ref 10.5–13.4)
RBC # BLD: 3.13 M/UL — LOW (ref 4.2–5.8)
RBC # BLD: 3.21 M/UL — LOW (ref 4.2–5.8)
RBC # BLD: 3.59 M/UL — LOW (ref 4.2–5.8)
RBC # FLD: 14.9 % — HIGH (ref 10.3–14.5)
RBC # FLD: 15.1 % — HIGH (ref 10.3–14.5)
RBC # FLD: 15.2 % — HIGH (ref 10.3–14.5)
SODIUM SERPL-SCNC: 134 MMOL/L — LOW (ref 135–145)
SODIUM SERPL-SCNC: 135 MMOL/L — SIGNIFICANT CHANGE UP (ref 135–145)
SODIUM SERPL-SCNC: 136 MMOL/L — SIGNIFICANT CHANGE UP (ref 135–145)
TRIGL FLD-MCNC: 29 MG/DL — SIGNIFICANT CHANGE UP
WBC # BLD: 10.86 K/UL — HIGH (ref 3.8–10.5)
WBC # BLD: 12.89 K/UL — HIGH (ref 3.8–10.5)
WBC # BLD: 9.88 K/UL — SIGNIFICANT CHANGE UP (ref 3.8–10.5)
WBC # FLD AUTO: 10.86 K/UL — HIGH (ref 3.8–10.5)
WBC # FLD AUTO: 12.89 K/UL — HIGH (ref 3.8–10.5)
WBC # FLD AUTO: 9.88 K/UL — SIGNIFICANT CHANGE UP (ref 3.8–10.5)

## 2023-05-10 RX ORDER — LACOSAMIDE 50 MG/1
100 TABLET ORAL EVERY 12 HOURS
Refills: 0 | Status: DISCONTINUED | OUTPATIENT
Start: 2023-05-10 | End: 2023-05-13

## 2023-05-10 RX ORDER — DIVALPROEX SODIUM 500 MG/1
1000 TABLET, DELAYED RELEASE ORAL EVERY 12 HOURS
Refills: 0 | Status: DISCONTINUED | OUTPATIENT
Start: 2023-05-10 | End: 2023-05-13

## 2023-05-10 RX ORDER — HYDROCORTISONE 20 MG
75 TABLET ORAL ONCE
Refills: 0 | Status: COMPLETED | OUTPATIENT
Start: 2023-05-10 | End: 2023-05-10

## 2023-05-10 RX ORDER — ASPIRIN/CALCIUM CARB/MAGNESIUM 324 MG
81 TABLET ORAL DAILY
Refills: 0 | Status: DISCONTINUED | OUTPATIENT
Start: 2023-05-10 | End: 2023-05-17

## 2023-05-10 RX ORDER — HYDROCORTISONE 20 MG
25 TABLET ORAL EVERY 12 HOURS
Refills: 0 | Status: DISCONTINUED | OUTPATIENT
Start: 2023-05-13 | End: 2023-05-13

## 2023-05-10 RX ORDER — HYDROCORTISONE 20 MG
100 TABLET ORAL ONCE
Refills: 0 | Status: COMPLETED | OUTPATIENT
Start: 2023-05-10 | End: 2023-05-10

## 2023-05-10 RX ORDER — HYDROCORTISONE 20 MG
75 TABLET ORAL EVERY 8 HOURS
Refills: 0 | Status: COMPLETED | OUTPATIENT
Start: 2023-05-11 | End: 2023-05-11

## 2023-05-10 RX ORDER — HYDROCORTISONE 20 MG
50 TABLET ORAL EVERY 12 HOURS
Refills: 0 | Status: COMPLETED | OUTPATIENT
Start: 2023-05-12 | End: 2023-05-12

## 2023-05-10 RX ORDER — FUROSEMIDE 40 MG
20 TABLET ORAL EVERY 12 HOURS
Refills: 0 | Status: DISCONTINUED | OUTPATIENT
Start: 2023-05-10 | End: 2023-05-11

## 2023-05-10 RX ORDER — HEPARIN SODIUM 5000 [USP'U]/ML
5000 INJECTION INTRAVENOUS; SUBCUTANEOUS EVERY 8 HOURS
Refills: 0 | Status: DISCONTINUED | OUTPATIENT
Start: 2023-05-10 | End: 2023-05-12

## 2023-05-10 RX ORDER — MAGNESIUM SULFATE 500 MG/ML
2 VIAL (ML) INJECTION ONCE
Refills: 0 | Status: COMPLETED | OUTPATIENT
Start: 2023-05-10 | End: 2023-05-10

## 2023-05-10 RX ADMIN — MIDODRINE HYDROCHLORIDE 5 MILLIGRAM(S): 2.5 TABLET ORAL at 02:46

## 2023-05-10 RX ADMIN — Medication 25 GRAM(S): at 13:22

## 2023-05-10 RX ADMIN — OXYCODONE HYDROCHLORIDE 10 MILLIGRAM(S): 5 TABLET ORAL at 19:33

## 2023-05-10 RX ADMIN — CHLORHEXIDINE GLUCONATE 1 APPLICATION(S): 213 SOLUTION TOPICAL at 05:05

## 2023-05-10 RX ADMIN — OXYCODONE HYDROCHLORIDE 10 MILLIGRAM(S): 5 TABLET ORAL at 13:52

## 2023-05-10 RX ADMIN — HEPARIN SODIUM 5000 UNIT(S): 5000 INJECTION INTRAVENOUS; SUBCUTANEOUS at 22:18

## 2023-05-10 RX ADMIN — Medication 81 MILLIGRAM(S): at 13:21

## 2023-05-10 RX ADMIN — OXYCODONE HYDROCHLORIDE 10 MILLIGRAM(S): 5 TABLET ORAL at 13:21

## 2023-05-10 RX ADMIN — ATORVASTATIN CALCIUM 20 MILLIGRAM(S): 80 TABLET, FILM COATED ORAL at 22:09

## 2023-05-10 RX ADMIN — DIVALPROEX SODIUM 1000 MILLIGRAM(S): 500 TABLET, DELAYED RELEASE ORAL at 18:05

## 2023-05-10 RX ADMIN — DEXMEDETOMIDINE HYDROCHLORIDE IN 0.9% SODIUM CHLORIDE 12.3 MICROGRAM(S)/KG/HR: 4 INJECTION INTRAVENOUS at 10:08

## 2023-05-10 RX ADMIN — Medication 20 MILLIGRAM(S): at 20:03

## 2023-05-10 RX ADMIN — Medication 60 MILLIGRAM(S): at 05:03

## 2023-05-10 RX ADMIN — Medication 2: at 16:49

## 2023-05-10 RX ADMIN — OXYCODONE HYDROCHLORIDE 5 MILLIGRAM(S): 5 TABLET ORAL at 23:27

## 2023-05-10 RX ADMIN — OXYCODONE HYDROCHLORIDE 10 MILLIGRAM(S): 5 TABLET ORAL at 19:45

## 2023-05-10 RX ADMIN — PIPERACILLIN AND TAZOBACTAM 25 GRAM(S): 4; .5 INJECTION, POWDER, LYOPHILIZED, FOR SOLUTION INTRAVENOUS at 13:21

## 2023-05-10 RX ADMIN — OXYCODONE HYDROCHLORIDE 5 MILLIGRAM(S): 5 TABLET ORAL at 22:57

## 2023-05-10 RX ADMIN — HEPARIN SODIUM 5000 UNIT(S): 5000 INJECTION INTRAVENOUS; SUBCUTANEOUS at 13:21

## 2023-05-10 RX ADMIN — LACOSAMIDE 100 MILLIGRAM(S): 50 TABLET ORAL at 18:05

## 2023-05-10 RX ADMIN — PIPERACILLIN AND TAZOBACTAM 25 GRAM(S): 4; .5 INJECTION, POWDER, LYOPHILIZED, FOR SOLUTION INTRAVENOUS at 05:47

## 2023-05-10 RX ADMIN — LACOSAMIDE 120 MILLIGRAM(S): 50 TABLET ORAL at 07:17

## 2023-05-10 RX ADMIN — Medication 100 MILLIGRAM(S): at 10:44

## 2023-05-10 RX ADMIN — Medication 75 MILLIGRAM(S): at 22:08

## 2023-05-10 NOTE — DIETITIAN INITIAL EVALUATION ADULT - CALCULATED TO (CAL/KG)
Patient has picked up her medication from the office, what she actually called for was to let us know that she needs a PA for her medication.  I am submitting the paperwork for that today.   [id number]

## 2023-05-10 NOTE — DIETITIAN INITIAL EVALUATION ADULT - PERTINENT LABORATORY DATA
05-10    136  |  104  |  18  ----------------------------<  103<H>  4.3   |  26  |  0.90    Ca    7.9<L>      10 May 2023 11:14  Phos  2.7     05-10  Mg     2.0     05-10    TPro  5.1<L>  /  Alb  2.2<L>  /  TBili  0.4  /  DBili  x   /  AST  8<L>  /  ALT  <5<L>  /  AlkPhos  67  05-10  POCT Blood Glucose.: 105 mg/dL (05-10-23 @ 11:17)  A1C with Estimated Average Glucose Result: 5.3 % (05-05-23 @ 07:25)  A1C with Estimated Average Glucose Result: 5.2 % (04-28-23 @ 05:54)  A1C with Estimated Average Glucose Result: 5.3 % (04-13-23 @ 17:08)

## 2023-05-10 NOTE — DIETITIAN INITIAL EVALUATION ADULT - OTHER CALCULATIONS
6'0''  pounds+-10%   Wt 154 pounds BMI 20.9 %IBW87  current body wt used for energy calculations as pt falls within % IBW  adjust for age, post op needs, pancreatitis, ICU level of care

## 2023-05-10 NOTE — CHART NOTE - NSCHARTNOTEFT_GEN_A_CORE
Exam:  US Chest    Procedure Date: 5/10/23    History: 53y Male whose CXR today shows a possible ___left______ sided pleural effusion.  Pt is POD #__5__ from TEVAR       Findings:                    Evaluation of the _____left_____ side of the thoracic cavity demonstrates                   small to moderate pleural effusion, one window for intervention     Impression:  small borderline moderate left pleural effusion. Plan to continue to monitor, diurese. Might need to be drained this admission.

## 2023-05-10 NOTE — PROGRESS NOTE ADULT - SUBJECTIVE AND OBJECTIVE BOX
CTICU  CRITICAL  CARE  attending     Hand off received 					   Pertinent clinical, laboratory, radiographic, hemodynamic, echocardiographic, respiratory data, microbiologic data and chart were reviewed and analyzed frequently throughout the course of the day and night  Patient seen and examined with CTS/ SH attending at bedside    Pt is a 53y , Male, post op day # 5 s/p 2nd stage TEVAR ( had total arch replaced 2 months ago);     post procedure:    remains extubated   mild weakness of proximal LE strength; L>R; resolved  lumbar drain d/cd  s/p R pigtail thoracentesis    today:    No LE motor deficits  weaning off midodrine  Supplemental O2 via HFNC  R apical Ptx      51 YO Male, current every day marijuana use, w/ PMHx of HTN, type A aortic dissection s/p Dacron grafts, AV resuspension in 2013, CAD s/p CABG x 1 SVG to RCA (5/2013 with Dr. Medina), seizure disorder (last episode on 7/4/22) who was admitted for surgical mgmt of progression of aneurysmal disease. Recent admission 3/20-4/14 during which patient underwent replacement of transverse aortic arch, second stage thoracic endovascular aortic repair, aorto-axillary bypass, AV replacement (bio 23mm), and CABG x 1 (SVG-RCA) EF 75% (Ignacio, 3/20). Post op cb lactic acidosis, severe cardiogenic and vasogenic shock, TREY requiring CVVHD and temporary dialysis requirement. Patient presented to [facility] ED 4/27 for syncope vs seizure episode at home, falling onto back of head. Nuerological workup proformed during that visit revealed a negative EEG, but given clinical picture of seizures, pt started on vimpat and depakote. Imaging preformed during that admission did no require acute surgical intervention on endoleak.   Pt presented to [facility] ED last night complaning of worsening epigastric pain. CTAP revealed continued endoleak. Sent to [facility] for further evaluation. Pt will be taken to the OR with Dr. Pierre this morning for a completion TEVAR. Pt consented, OR aware.         , FAMILY HISTORY:  No pertinent family history in first degree relatives    PAST MEDICAL & SURGICAL HISTORY:  HTN (hypertension)      Aortic dissection      CAD (coronary artery disease)      Seizure disorder      S/P aortic bifurcation bypass graft      S/P CABG x 1        Patient is a 53y old  Male who presents with a chief complaint of endoleak, abdominal pain (10 May 2023 11:15)      14 system review limited 2/2 post op morbidity    Vital signs, hemodynamic and respiratory parameters were reviewed from the bedside nursing flowsheet.  ICU Vital Signs Last 24 Hrs  T(C): 36.8 (10 May 2023 13:58), Max: 37.5 (09 May 2023 17:20)  T(F): 98.2 (10 May 2023 13:58), Max: 99.5 (09 May 2023 17:20)  HR: 90 (10 May 2023 15:00) (63 - 99)  BP: 167/84 (10 May 2023 09:00) (167/84 - 167/84)  BP(mean): 110 (10 May 2023 09:00) (110 - 110)  ABP: 126/66 (10 May 2023 15:00) (119/63 - 170/69)  ABP(mean): 85 (10 May 2023 15:00) (83 - 116)  RR: 18 (10 May 2023 15:00) (16 - 21)  SpO2: 96% (10 May 2023 15:00) (94% - 100%)    O2 Parameters below as of 10 May 2023 15:00  Patient On (Oxygen Delivery Method): nasal cannula, high flow  O2 Flow (L/min): 50  O2 Concentration (%): 50      Adult Advanced Hemodynamics Last 24 Hrs  CVP(mm Hg): 3 (10 May 2023 15:00) (0 - 23)  CVP(cm H2O): --  CO: --  CI: --  PA: 5/5 (09 May 2023 17:00) (5/5 - 32/20)  PA(mean): 5 (09 May 2023 17:00) (5 - 25)  PCWP: --  SVR: --  SVRI: --  PVR: --  PVRI: --, ABG - ( 10 May 2023 11:09 )  pH, Arterial: 7.42  pH, Blood: x     /  pCO2: 35    /  pO2: 99    / HCO3: 23    / Base Excess: -1.3  /  SaO2: 99.4                Intake and output was reviewed and the fluid balance was calculated  Daily     Daily   I&O's Summary    09 May 2023 07:01  -  10 May 2023 07:00  --------------------------------------------------------  IN: 2364.5 mL / OUT: 2360 mL / NET: 4.5 mL    10 May 2023 07:01  -  10 May 2023 15:53  --------------------------------------------------------  IN: 432.1 mL / OUT: 610 mL / NET: -177.9 mL        All lines and drain sites were assessed  [facility]      POCT Blood Glucose.: 105 mg/dL (10 May 2023 11:17)    No acute change in mental status  Auscultation of the chest reveals equal bs  Abdomen is soft  Extremities are warm and well perfused  Wounds appear clean and unremarkable  Antibiotics are periop    labs  CBC Full  -  ( 10 May 2023 11:14 )  WBC Count : 10.86 K/uL  RBC Count : 3.21 M/uL  Hemoglobin : 9.5 g/dL  Hematocrit : 29.5 %  Platelet Count - Automated : 143 K/uL  Mean Cell Volume : 91.9 fl  Mean Cell Hemoglobin : 29.6 pg  Mean Cell Hemoglobin Concentration : 32.2 gm/dL  Auto Neutrophil # : x  Auto Lymphocyte # : x  Auto Monocyte # : x  Auto Eosinophil # : x  Auto Basophil # : x  Auto Neutrophil % : x  Auto Lymphocyte % : x  Auto Monocyte % : x  Auto Eosinophil % : x  Auto Basophil % : x    05-10    136  |  104  |  18  ----------------------------<  103<H>  4.3   |  26  |  0.90    Ca    7.9<L>      10 May 2023 11:14  Phos  2.7     05-10  Mg     2.0     05-10    TPro  5.1<L>  /  Alb  2.2<L>  /  TBili  0.4  /  DBili  x   /  AST  8<L>  /  ALT  <5<L>  /  AlkPhos  67  05-10    PT/INR - ( 10 May 2023 11:14 )   PT: 15.8 sec;   INR: 1.32          PTT - ( 10 May 2023 11:14 )  PTT:39.1 sec  The current medications were reviewed   MEDICATIONS  (STANDING):  aspirin enteric coated 81 milliGRAM(s) Oral daily  atorvastatin 20 milliGRAM(s) Oral at bedtime  chlorhexidine 2% Cloths 1 Application(s) Topical daily  dexMEDEtomidine Infusion 0.7 MICROgram(s)/kG/Hr (12.3 mL/Hr) IV Continuous <Continuous>  dextrose 5%. 1000 milliLiter(s) (50 mL/Hr) IV Continuous <Continuous>  dextrose 5%. 1000 milliLiter(s) (100 mL/Hr) IV Continuous <Continuous>  dextrose 50% Injectable 12.5 Gram(s) IV Push once  dextrose 50% Injectable 25 Gram(s) IV Push once  dextrose 50% Injectable 25 Gram(s) IV Push once  divalproex DR 1000 milliGRAM(s) Oral every 12 hours  glucagon  Injectable 1 milliGRAM(s) IntraMuscular once  heparin   Injectable 5000 Unit(s) SubCutaneous every 8 hours  hydrocortisone sodium succinate Injectable 75 milliGRAM(s) IV Push once  insulin lispro (ADMELOG) corrective regimen sliding scale   SubCutaneous Before meals and at bedtime  lacosamide 100 milliGRAM(s) Oral every 12 hours  pantoprazole    Tablet 40 milliGRAM(s) Oral before breakfast  piperacillin/tazobactam IVPB.. 3.375 Gram(s) IV Intermittent every 8 hours  polyethylene glycol 3350 17 Gram(s) Oral daily  senna 2 Tablet(s) Oral at bedtime  sodium chloride 0.9%. 1000 milliLiter(s) (10 mL/Hr) IV Continuous <Continuous>    MEDICATIONS  (PRN):  acetaminophen     Tablet .. 650 milliGRAM(s) Oral every 6 hours PRN Mild Pain (1 - 3)  dextrose Oral Gel 15 Gram(s) Oral once PRN Blood Glucose LESS THAN 70 milliGRAM(s)/deciliter  oxyCODONE    IR 5 milliGRAM(s) Oral every 6 hours PRN Moderate Pain (4 - 6)  oxyCODONE    IR 10 milliGRAM(s) Oral every 6 hours PRN Severe Pain (7 - 10)       PROBLEM LIST/ ASSESSMENT:  HEALTH ISSUES - PROBLEM Dx:      ,   Patient is a 53y old  Male who presents with a chief complaint of endoleak, abdominal pain (10 May 2023 11:15)     s/p TEVAR      My plan includes :    Maintain MAP >80  Supplemental O2 as needed  Titrate Fio2 to maintain Sao2 >95%  Serial ABGs  continue R pigtail decompression for R apical Ptx  F/u CXR    close hemodynamic, ventilatory and drain monitoring and management per post op routine    Monitor for arrhythmias and monitor parameters for organ perfusion  monitor neurologic status  Head of the bed should remain elevated to 45 deg .   chest PT and IS will be encouraged  monitor adequacy of oxygenation and ventilation and attempt to wean oxygen  Nutritional goals will be met using po eventually , ensure adequate caloric intake and montior the same  Stress ulcer and VTE prophylaxis will be achieved    Glycemic control is satisfactory  Electrolytes have been repleted as necessary and wound care has been carried out. Pain control has been achieved.   agressive physical therapy and early mobility and ambulation goals will be met   The family was updated about the course and plan  CRITICAL CARE TIME SPENT in evaluation and management, reassessments, review and interpretation of labs and x-rays, ventilator and hemodynamic management, formulating a plan and coordinating care: ___90____ MIN.  Time does not include procedural time.  CTICU ATTENDING     					    Dalton Martinez MD

## 2023-05-10 NOTE — DIETITIAN INITIAL EVALUATION ADULT - PERTINENT MEDS FT
MEDICATIONS  (STANDING):  aspirin enteric coated 81 milliGRAM(s) Oral daily  atorvastatin 20 milliGRAM(s) Oral at bedtime  chlorhexidine 2% Cloths 1 Application(s) Topical daily  dexMEDEtomidine Infusion 0.7 MICROgram(s)/kG/Hr (12.3 mL/Hr) IV Continuous <Continuous>  dextrose 5%. 1000 milliLiter(s) (50 mL/Hr) IV Continuous <Continuous>  dextrose 5%. 1000 milliLiter(s) (100 mL/Hr) IV Continuous <Continuous>  dextrose 50% Injectable 25 Gram(s) IV Push once  dextrose 50% Injectable 25 Gram(s) IV Push once  dextrose 50% Injectable 12.5 Gram(s) IV Push once  divalproex DR 1000 milliGRAM(s) Oral every 12 hours  glucagon  Injectable 1 milliGRAM(s) IntraMuscular once  heparin   Injectable 5000 Unit(s) SubCutaneous every 8 hours  hydrocortisone sodium succinate Injectable 75 milliGRAM(s) IV Push once  insulin lispro (ADMELOG) corrective regimen sliding scale   SubCutaneous Before meals and at bedtime  lacosamide 100 milliGRAM(s) Oral every 12 hours  pantoprazole    Tablet 40 milliGRAM(s) Oral before breakfast  piperacillin/tazobactam IVPB.. 3.375 Gram(s) IV Intermittent every 8 hours  polyethylene glycol 3350 17 Gram(s) Oral daily  senna 2 Tablet(s) Oral at bedtime  sodium chloride 0.9%. 1000 milliLiter(s) (10 mL/Hr) IV Continuous <Continuous>    MEDICATIONS  (PRN):  acetaminophen     Tablet .. 650 milliGRAM(s) Oral every 6 hours PRN Mild Pain (1 - 3)  dextrose Oral Gel 15 Gram(s) Oral once PRN Blood Glucose LESS THAN 70 milliGRAM(s)/deciliter  oxyCODONE    IR 5 milliGRAM(s) Oral every 6 hours PRN Moderate Pain (4 - 6)  oxyCODONE    IR 10 milliGRAM(s) Oral every 6 hours PRN Severe Pain (7 - 10)

## 2023-05-10 NOTE — DIETITIAN INITIAL EVALUATION ADULT - OTHER INFO
51 YO M, PMHx HTN, type A aortic dissection s/p Dacron grafts, AV resuspension 2013, CAD s/p CABG x1 SVG to RCA (5/2013), seizure disorder (last episode on 7/4/22) who was admitted for surgical mgmt of progression of aneurysmal disease. Recent admission 3/20-4/14: underwent replacement of transverse aortic arch, second stage thoracic endovascular aortic repair, aorto-axillary bypass, AV replacement (bio 23mm), and CABG x1 (SVG-RCA) EF 75% (Ignacio, 3/20). Post op cb lactic acidosis, severe cardiogenic and vasogenic shock, TREY requiring CVVHD and temporary dialysis requirement. Pt presented to [facility] ED 4/27 for syncope vs seizure episode at home, falling onto back of head. Nuerological workup proformed during that visit revealed a negative EEG, but given clinical picture of seizures, pt started on vimpat and depakote. Imaging preformed during that admission did no require acute surgical intervention on endoleak. Pt presented to [facility] ED c/o worsening epigastric pain. CTAP revealed continued endoleak. Sent to [facility] for further evaluation. S/p Second stage thoracic endovascular aortic repair 5/5. Course c/b pleural effusions, S/P Right pigtail insertion for pneumothorax 5/9. Also c/b acute pancreatitis with large peripancreatic/perigastric fluid collections on CTA abdomen on 5/8. Noted to be resolving, No acute surgical intervention indicated at this time.    Pt seen this AM on 9EAST. Pt able to provide little information to RD, thus spoke with RN. Ordered for Clears, has not yet had beth-RN reports first beth pending test of results. No reports of N/V, Denies BM thus far. Ordered for Protonix, Miralax and senna. Lipids WDL (March 2023). No pain. Sandro 16. 2+BL leg/ankle edema. No pressure ulcers-SX site and skin tears are noted.   Please see below for nutritions recommendations.

## 2023-05-10 NOTE — PROGRESS NOTE ADULT - ASSESSMENT
53yr old male with PMH daily marijuana use, HTN, type A dissection s/p Dacron grafts and AV resuspension (2013), CAD sp CABG x 1 (Adam, 5/2013, SVG-RCA), seizure disorder, and PSx of transverse aortic arch, second stage thoracic endovascular aortic repair, aorto-axillary bypass, AV replacement (bio 23mm), and CABG x 1 (SVG-RCA) EF 75% (Ignaico, 3/20). Presented to [place] ED 5/4 for epigastric pain. CT exam which showed known endoleak for which pt was tx to [facility]. Patient underwent TEVAR with Dr. Pierre 5/5, LD placed prior by NSGY. General surgery consulted on 5/8 with finding of diffuse pancreatic enlargement and heterogenous attenuation predominantly in body and tail, with multiple intrapancreatic and peripancreatic fluid collections with well-defined enhancing wall, new since November 30, 2022, not significantly changed since May 5, 2023 on CTA abdomen in the setting of uptrending leukocytosis. Pt afebrile since admission, tachycardic to 110s, normotensive on midodrine 5mg q8, and satting well on RA. Subjectively reports feeling well without abdominal pain, nausea, or vomiting since admission. Reports passing flatus and is tolerating a DASH diet. Subjective history, physical exam, laboratory data, and imaging suggestive of resolving pancreatitis with peripancreatic/perigastric collections (not necrotizing pancreatitis). WBC has resolved.    - No acute surgical intervention indicated at this time. Pancreatitis task force activated  - f/u recs from the pancreatic task force team   - f/u final read of RUQ US - no stones noted in the gallbladder   - No need for abx - WBC improving, and patient afebrile with benign physical exam, abx might not be indicated, but would defer to primary team  - Rest of care per primary team  - Surgery Team 4C to sign off 53yr old male with PMH daily marijuana use, HTN, type A dissection s/p Dacron grafts and AV resuspension (2013), CAD sp CABG x 1 (Adam, 5/2013, SVG-RCA), seizure disorder, and PSx of transverse aortic arch, second stage thoracic endovascular aortic repair, aorto-axillary bypass, AV replacement (bio 23mm), and CABG x 1 (SVG-RCA) EF 75% (Ignacio, 3/20). Presented to [place] ED 5/4 for epigastric pain. CT exam which showed known endoleak for which pt was tx to [facility]. Patient underwent TEVAR with Dr. Pierre 5/5, LD placed prior by NSGY. General surgery consulted on 5/8 with finding of diffuse pancreatic enlargement and heterogenous attenuation predominantly in body and tail, with multiple intrapancreatic and peripancreatic fluid collections with well-defined enhancing wall, new since November 30, 2022, not significantly changed since May 5, 2023 on CTA abdomen in the setting of uptrending leukocytosis. Pt afebrile since admission, tachycardic to 110s, normotensive on midodrine 5mg q8, and satting well on RA. Subjectively reports feeling well without abdominal pain, nausea, or vomiting since admission. Reports passing flatus and is tolerating a DASH diet. Subjective history, physical exam, laboratory data, and imaging suggestive of resolving pancreatitis with peripancreatic/perigastric collections (not necrotizing pancreatitis). WBC has resolved.    - No acute surgical intervention indicated at this time. Pancreatitis task force activated  - f/u recs from the pancreatic task force team   - f/u final read of RUQ US - no stones noted in the gallbladder   - No need for abx - WBC improving, and patient afebrile with benign physical exam  - Rest of care per primary team  - Surgery Team 4C to sign off

## 2023-05-10 NOTE — DIETITIAN INITIAL EVALUATION ADULT - ENTER TO (ML/KG)
This writer spoke with patient's mother. Patient is enrolled in Early Intervention. Has started process of scheduling school district screening for pre-K. Previous call to this department 08/01/2022 when mother declined intake interview to add to [facility]. Call today explained again the wait for developmental peds in [place]. Mother again did not wish to add patient to Dr. Varela's wait list. PCP gave referrals for audiology, neurology and dev peds. Mother stated patient has an appointment with neurology 02/2023. Provided mother with pediatric therapy dept phone number and PDC phone number. Mother is aware that patient is NOT on Dr. Varela's wait list.     30

## 2023-05-10 NOTE — PROGRESS NOTE ADULT - ASSESSMENT
This is a 54yo Male pt with PMH HTN, type A aortic dissection s/p Dacron grafts, AV resuspension in 2013, CAD s/p CABG x 1 SVG to RCA (5/2013 with Dr. Medina), seizure disorder (last episode on 7/4/22) S/P replacement of transverse aortic arch, second stage thoracic endovascular aortic repair, aorto-axillary bypass, AV replacement (bio 23mm), in APr 2023 and CABG x 1 (SVG-RCA) EF 75% (Ignacio, 3/20) now s/p 2nd state TEVAR on 5/5 for whom surgery was consulted for finding of acute pancreatitis with large peripancreatic/perigastric fluid collections on CTA abdomen on 5/8.    - MRCP when possible.   - Appreciate GI recommendations for PO feeding as tolearted.   - Hepatobiliary Surgery (Team 1C) following. This is a 54yo Male pt with PMH HTN, type A aortic dissection s/p Dacron grafts, AV resuspension in 2013, CAD s/p CABG x 1 SVG to RCA (5/2013 with Dr. Medina), seizure disorder (last episode on 7/4/22) S/P replacement of transverse aortic arch, second stage thoracic endovascular aortic repair, aorto-axillary bypass, AV replacement (bio 23mm), in APr 2023 and CABG x 1 (SVG-RCA) EF 75% (Ignacio, 3/20) now s/p 2nd state TEVAR on 5/5 for whom surgery was consulted for finding of acute pancreatitis with large peripancreatic/perigastric fluid collections on CTA abdomen on 5/8.    - Please obtain lipase level tomorrow 5/11/23.  - Appreciate GI recommendations for PO feeding as tolerated.   - Hepatobiliary Surgery (Team 1C) following.

## 2023-05-10 NOTE — DIETITIAN INITIAL EVALUATION ADULT - NSFNSGIIOFT_GEN_A_CORE
05-09-23 @ 07:01  -  05-10-23 @ 07:00  --------------------------------------------------------  OUT:    Chest Tube (mL): 870 mL  Total OUT: 870 mL    Total NET: -320 mL      05-10-23 @ 07:01  -  05-10-23 @ 15:56  --------------------------------------------------------  OUT:    Chest Tube (mL): 150 mL  Total OUT: 150 mL    Total NET: 0 mL

## 2023-05-10 NOTE — DIETITIAN INITIAL EVALUATION ADULT - ADD RECOMMEND
1. Monitor Need for NPO and Bowel Rest.  2. Should PO be able to be adv, begin with clears. As tolerated adv: Low fat full liquids -> DASH TLC low fat diet.  3. In the event diet unable to be adv and alternate means of nutrition are to begin, please reconsult for Recs PRN.  4. Pain/GI per team. Monitor Skin, Wt, GOC.  5. RD to remain available for additional nutrition interventions as needed.  * High Nutrition Risk.

## 2023-05-10 NOTE — PROGRESS NOTE ADULT - SUBJECTIVE AND OBJECTIVE BOX
CTICU  CRITICAL  CARE  attending     Hand off received 					   Pertinent clinical, laboratory, radiographic, hemodynamic, echocardiographic, respiratory data, microbiologic data and chart were reviewed and analyzed frequently throughout the course of the day and night        52 year old Male with HTN, type A aortic dissection s/p Dacron grafts, AV resuspension in 2013, CAD s/p CABG x 1 SVG to RCA (5/2013 with Dr. Medina), seizure disorder (last episode on 7/4/22)   He is a current every day marijuana user.   He was admitted for surgical management of progression of aneurysmal disease.   On 3/20/23 the patient underwent replacement of transverse aortic arch, second stage thoracic endovascular aortic repair, aorto-axillary bypass, AV replacement (bio 23mm),CABG x 1 (SVG-RCA).   Postoperative course complicated by lactic acidosis, severe cardiogenic and vasogenic shock, TREY requiring CVVHD and temporary dialysis requirement. Eventually discharged 4/14/23.   The patient presented to [facility] Emergency Room 4/27 for syncope vs seizure episode at home, falling backwards on the head.   Neurological workup performed during that visit revealed a negative EEG, but given clinical picture of seizures, he was started on vimpat and depakote.   Imaging preformed during that admission did no necessitate acute surgical intervention on endoleak.   He was readmitted to [facility] complaining of worsening epigastric pain.   CTAP revealed continued endoleak.   He was transferred to [facility] for further evaluation.    S/P Second stage TEVAR.       FAMILY HISTORY:  No pertinent family history in first degree relatives    PAST MEDICAL & SURGICAL HISTORY:  HTN (hypertension)  Aortic dissection  CAD (coronary artery disease)  Seizure disorder  S/P aortic bifurcation bypass graft  H/O CABG x 1          14 system review was unremarkable    Vital signs, hemodynamic and respiratory parameters were reviewed from the bedside nursing flow sheet.  ICU Vital Signs Last 24 Hrs  T(C): 36.6 (10 May 2023 21:15), Max: 37.1 (10 May 2023 01:01)  T(F): 97.8 (10 May 2023 21:15), Max: 98.8 (10 May 2023 01:01)  HR: 85 (10 May 2023 21:06) (63 - 108)  BP: 167/84 (10 May 2023 09:00) (167/84 - 167/84)  BP(mean): 110 (10 May 2023 09:00) (110 - 110)  ABP: 135/76 (10 May 2023 21:00) (113/59 - 170/69)  ABP(mean): 96 (10 May 2023 21:00) (77 - 116)  RR: 20 (10 May 2023 21:06) (16 - 20)  SpO2: 100% (10 May 2023 21:06) (94% - 100%)    O2 Parameters below as of 10 May 2023 22:00  Patient On (Oxygen Delivery Method): nasal cannula, high flow  O2 Flow (L/min): 50  O2 Concentration (%): 50      Adult Advanced Hemodynamics Last 24 Hrs  CVP(mm Hg): 9 (10 May 2023 21:00) (-3 - 23)  CVP(cm H2O): --  CO: --  CI: --  PA: --  PA(mean): --  PCWP: --  SVR: --  SVRI: --  PVR: --  PVRI: --, ABG - ( 10 May 2023 11:09 )  pH, Arterial: 7.42  pH, Blood: x     /  pCO2: 35    /  pO2: 99    / HCO3: 23    / Base Excess: -1.3  /  SaO2: 99.4                Intake and output was reviewed and the fluid balance was calculated  Daily     Daily   I&O's Summary    09 May 2023 07:01  -  10 May 2023 07:00  --------------------------------------------------------  IN: 2364.5 mL / OUT: 2360 mL / NET: 4.5 mL    10 May 2023 07:01  -  10 May 2023 22:14  --------------------------------------------------------  IN: 567.1 mL / OUT: 690 mL / NET: -122.9 mL        All lines and drain sites were assessed    Neuro: No change in the neurological status from the baseline.   Neck: No JVD.  CVS: S1, S2, No S3.  Lungs: Good air entry bilaterally.  Abd: Soft. No tenderness. + Bowel sounds.  Vascular: + DP/PT.  Extremities: No edema.  Lymphatic: Normal.  Skin: No abnormalities.      labs  CBC Full  -  ( 10 May 2023 11:14 )  WBC Count : 10.86 K/uL  RBC Count : 3.21 M/uL  Hemoglobin : 9.5 g/dL  Hematocrit : 29.5 %  Platelet Count - Automated : 143 K/uL  Mean Cell Volume : 91.9 fl  Mean Cell Hemoglobin : 29.6 pg  Mean Cell Hemoglobin Concentration : 32.2 gm/dL  Auto Neutrophil # : x  Auto Lymphocyte # : x  Auto Monocyte # : x  Auto Eosinophil # : x  Auto Basophil # : x  Auto Neutrophil % : x  Auto Lymphocyte % : x  Auto Monocyte % : x  Auto Eosinophil % : x  Auto Basophil % : x    05-10    136  |  104  |  18  ----------------------------<  103<H>  4.3   |  26  |  0.90    Ca    7.9<L>      10 May 2023 11:14  Phos  2.7     05-10  Mg     2.0     05-10    TPro  5.1<L>  /  Alb  2.2<L>  /  TBili  0.4  /  DBili  x   /  AST  8<L>  /  ALT  <5<L>  /  AlkPhos  67  05-10    PT/INR - ( 10 May 2023 11:14 )   PT: 15.8 sec;   INR: 1.32          PTT - ( 10 May 2023 11:14 )  PTT:39.1 sec  The current medications were reviewed   MEDICATIONS  (STANDING):  aspirin enteric coated 81 milliGRAM(s) Oral daily  atorvastatin 20 milliGRAM(s) Oral at bedtime  chlorhexidine 2% Cloths 1 Application(s) Topical daily  dextrose 5%. 1000 milliLiter(s) (100 mL/Hr) IV Continuous <Continuous>  dextrose 5%. 1000 milliLiter(s) (50 mL/Hr) IV Continuous <Continuous>  dextrose 50% Injectable 25 Gram(s) IV Push once  dextrose 50% Injectable 25 Gram(s) IV Push once  dextrose 50% Injectable 12.5 Gram(s) IV Push once  divalproex DR 1000 milliGRAM(s) Oral every 12 hours  furosemide   Injectable 20 milliGRAM(s) IV Push every 12 hours  glucagon  Injectable 1 milliGRAM(s) IntraMuscular once  heparin   Injectable 5000 Unit(s) SubCutaneous every 8 hours  hydrocortisone sodium succinate Injectable 75 milliGRAM(s) IV Push once  insulin lispro (ADMELOG) corrective regimen sliding scale   SubCutaneous Before meals and at bedtime  lacosamide 100 milliGRAM(s) Oral every 12 hours  pantoprazole    Tablet 40 milliGRAM(s) Oral before breakfast  polyethylene glycol 3350 17 Gram(s) Oral daily  senna 2 Tablet(s) Oral at bedtime  sodium chloride 0.9%. 1000 milliLiter(s) (10 mL/Hr) IV Continuous <Continuous>    MEDICATIONS  (PRN):  acetaminophen     Tablet .. 650 milliGRAM(s) Oral every 6 hours PRN Mild Pain (1 - 3)  dextrose Oral Gel 15 Gram(s) Oral once PRN Blood Glucose LESS THAN 70 milliGRAM(s)/deciliter  oxyCODONE    IR 5 milliGRAM(s) Oral every 6 hours PRN Moderate Pain (4 - 6)  oxyCODONE    IR 10 milliGRAM(s) Oral every 6 hours PRN Severe Pain (7 - 10)        53 year old  Male admitted with abdominal pain due to endoleak.  S/P Second Stage TEVAR.  Hemodynamically stable.  Good oxygenation.  Fair urine out put.        My plan includes :  Encourage PO intake.   Anti seizure Rx.   Statin and Betablocker.  Close hemodynamic monitoring   Monitor for arrhythmias and monitor parameters for organ perfusion  Monitor neurologic status  Monitor renal function.  Head of the bed should remain elevated to 45 deg .   Chest PT and IS will be encouraged  Monitor adequacy of oxygenation   Nutritional goals will be met using po eventually , ensure adequate caloric intake and monitor the same  Stress ulcer and VTE prophylaxis will be achieved    Glycemic control is satisfactory  Electrolytes have been repleted as necessary and wound care has been carried out. Pain control has been achieved.   Aggressive physical therapy and early mobility and ambulation goals will be met   The family was updated about the course and plan  CRITICAL CARE TIME SPENT in evaluation and management, review and interpretation of labs and x-rays, hemodynamic management, formulating a plan and coordinating care: ___30____ MIN.  Time does not include procedural time.  CTICU ATTENDING     					    José Miguel Torres MD

## 2023-05-10 NOTE — PROGRESS NOTE ADULT - SUBJECTIVE AND OBJECTIVE BOX
SUBJECTIVE: Pt seen and examined at bedside this am.  No acute complaints overnight.    MEDICATIONS  (STANDING):  aspirin enteric coated 81 milliGRAM(s) Oral daily  atorvastatin 20 milliGRAM(s) Oral at bedtime  chlorhexidine 2% Cloths 1 Application(s) Topical daily  dexMEDEtomidine Infusion 0.7 MICROgram(s)/kG/Hr (12.3 mL/Hr) IV Continuous <Continuous>  dextrose 5%. 1000 milliLiter(s) (100 mL/Hr) IV Continuous <Continuous>  dextrose 5%. 1000 milliLiter(s) (50 mL/Hr) IV Continuous <Continuous>  dextrose 50% Injectable 25 Gram(s) IV Push once  dextrose 50% Injectable 25 Gram(s) IV Push once  dextrose 50% Injectable 12.5 Gram(s) IV Push once  glucagon  Injectable 1 milliGRAM(s) IntraMuscular once  heparin   Injectable 5000 Unit(s) SubCutaneous every 8 hours  hydrocortisone sodium succinate Injectable 100 milliGRAM(s) IV Push once  hydrocortisone sodium succinate Injectable 75 milliGRAM(s) IV Push once  insulin lispro (ADMELOG) corrective regimen sliding scale   SubCutaneous Before meals and at bedtime  lacosamide IVPB 100 milliGRAM(s) IV Intermittent every 12 hours  pantoprazole    Tablet 40 milliGRAM(s) Oral before breakfast  piperacillin/tazobactam IVPB.. 3.375 Gram(s) IV Intermittent every 8 hours  polyethylene glycol 3350 17 Gram(s) Oral daily  senna 2 Tablet(s) Oral at bedtime  sodium chloride 0.9%. 1000 milliLiter(s) (10 mL/Hr) IV Continuous <Continuous>  valproate sodium  IVPB 1000 milliGRAM(s) IV Intermittent every 12 hours    MEDICATIONS  (PRN):  acetaminophen     Tablet .. 650 milliGRAM(s) Oral every 6 hours PRN Mild Pain (1 - 3)  dextrose Oral Gel 15 Gram(s) Oral once PRN Blood Glucose LESS THAN 70 milliGRAM(s)/deciliter  oxyCODONE    IR 5 milliGRAM(s) Oral every 6 hours PRN Moderate Pain (4 - 6)  oxyCODONE    IR 10 milliGRAM(s) Oral every 6 hours PRN Severe Pain (7 - 10)      Vital Signs Last 24 Hrs  T(C): 36.9 (10 May 2023 09:34), Max: 37.5 (09 May 2023 17:20)  T(F): 98.4 (10 May 2023 09:34), Max: 99.5 (09 May 2023 17:20)  HR: 63 (10 May 2023 10:00) (63 - 107)  BP: 167/84 (10 May 2023 09:00) (167/84 - 167/84)  BP(mean): 110 (10 May 2023 09:00) (110 - 110)  RR: 20 (10 May 2023 10:00) (16 - 21)  SpO2: 100% (10 May 2023 10:00) (93% - 100%)    Parameters below as of 10 May 2023 10:00  Patient On (Oxygen Delivery Method): nasal cannula, high flow  O2 Flow (L/min): 50  O2 Concentration (%): 50    Physical Exam  General: NAD, resting comfortably in bed  Pulmonary: Nonlabored breathing, no respiratory distress  CV: NSR  Abd: soft, NT/ND, no guarding,   Extremities: (-) edema, warm, well-perfused      I&O's Detail    09 May 2023 07:01  -  10 May 2023 07:00  --------------------------------------------------------  IN:    Albumin 25%  -  50 mL: 50 mL    Albumin 5%  - 250 mL: 500 mL    Dexmedetomidine: 57.3 mL    Fat Emulsion (Fish Oil &amp; Plant Based) 20% Infusion: 227.2 mL    IV PiggyBack: 450 mL    IV PiggyBack: 170 mL    Oral Fluid: 240 mL    sodium chloride 0.9%: 120 mL    TPN (Total Parenteral Nutrition): 550 mL  Total IN: 2364.5 mL    OUT:    Chest Tube (mL): 870 mL    Voided (mL): 1490 mL  Total OUT: 2360 mL    Total NET: 4.5 mL      10 May 2023 07:01  -  10 May 2023 10:43  --------------------------------------------------------  IN:    Dexmedetomidine: 10.5 mL    Fat Emulsion (Fish Oil &amp; Plant Based) 20% Infusion: 41.6 mL    IV PiggyBack: 50 mL    sodium chloride 0.9%: 30 mL    TPN (Total Parenteral Nutrition): 150 mL  Total IN: 282.1 mL    OUT:    Chest Tube (mL): 80 mL    Voided (mL): 150 mL  Total OUT: 230 mL    Total NET: 52.1 mL          LABS:                        9.4    9.88  )-----------( 141      ( 10 May 2023 03:36 )             28.7     05-10    135  |  104  |  18  ----------------------------<  122<H>  4.2   |  27  |  0.97    Ca    8.1<L>      10 May 2023 03:36  Phos  2.9     05-10  Mg     2.1     05-10    TPro  5.1<L>  /  Alb  2.4<L>  /  TBili  0.4  /  DBili  x   /  AST  9<L>  /  ALT  <5<L>  /  AlkPhos  65  05-10    PT/INR - ( 10 May 2023 03:36 )   PT: 16.5 sec;   INR: 1.38          PTT - ( 10 May 2023 03:36 )  PTT:39.8 sec      RADIOLOGY & ADDITIONAL STUDIES:

## 2023-05-10 NOTE — PROGRESS NOTE ADULT - SUBJECTIVE AND OBJECTIVE BOX
IDENTIFICATION: This is a 54yo Male pt with PMH HTN, type A aortic dissection s/p Dacron grafts, AV resuspension in 2013, CAD s/p CABG x 1 SVG to RCA (5/2013 with Dr. Medina), seizure disorder (last episode on 7/4/22) S/P replacement of transverse aortic arch, second stage thoracic endovascular aortic repair, aorto-axillary bypass, AV replacement (bio 23mm), in APr 2023 and CABG x 1 (SVG-RCA) EF 75% (Ignacio, 3/20) now s/p 2nd state TEVAR on 5/5 for whom surgery was consulted for finding of acute pancreatitis with large peripancreatic/perigastric fluid collections on CTA abdomen on 5/8.    EVENTS:   - Right pigtail catheter placed on right for effusion  - RUQ US shows no gallstones, CBD 0.6cm.     SUBJECTIVE:   - Sleepy this morning with minimal response to examiner.    MEDICATIONS  (STANDING):  aspirin enteric coated 81 milliGRAM(s) Oral daily  atorvastatin 20 milliGRAM(s) Oral at bedtime  chlorhexidine 2% Cloths 1 Application(s) Topical daily  dexMEDEtomidine Infusion 0.7 MICROgram(s)/kG/Hr (12.3 mL/Hr) IV Continuous <Continuous>  dextrose 5%. 1000 milliLiter(s) (100 mL/Hr) IV Continuous <Continuous>  dextrose 5%. 1000 milliLiter(s) (50 mL/Hr) IV Continuous <Continuous>  dextrose 50% Injectable 25 Gram(s) IV Push once  dextrose 50% Injectable 25 Gram(s) IV Push once  dextrose 50% Injectable 12.5 Gram(s) IV Push once  glucagon  Injectable 1 milliGRAM(s) IntraMuscular once  heparin   Injectable 5000 Unit(s) SubCutaneous every 8 hours  hydrocortisone sodium succinate Injectable 75 milliGRAM(s) IV Push once  insulin lispro (ADMELOG) corrective regimen sliding scale   SubCutaneous Before meals and at bedtime  lacosamide IVPB 100 milliGRAM(s) IV Intermittent every 12 hours  pantoprazole    Tablet 40 milliGRAM(s) Oral before breakfast  piperacillin/tazobactam IVPB.. 3.375 Gram(s) IV Intermittent every 8 hours  polyethylene glycol 3350 17 Gram(s) Oral daily  senna 2 Tablet(s) Oral at bedtime  sodium chloride 0.9%. 1000 milliLiter(s) (10 mL/Hr) IV Continuous <Continuous>  valproate sodium  IVPB 1000 milliGRAM(s) IV Intermittent every 12 hours    MEDICATIONS  (PRN):  acetaminophen     Tablet .. 650 milliGRAM(s) Oral every 6 hours PRN Mild Pain (1 - 3)  dextrose Oral Gel 15 Gram(s) Oral once PRN Blood Glucose LESS THAN 70 milliGRAM(s)/deciliter  oxyCODONE    IR 5 milliGRAM(s) Oral every 6 hours PRN Moderate Pain (4 - 6)  oxyCODONE    IR 10 milliGRAM(s) Oral every 6 hours PRN Severe Pain (7 - 10)      Vital Signs Last 24 Hrs  T(C): 36.9 (10 May 2023 09:34), Max: 37.5 (09 May 2023 17:20)  T(F): 98.4 (10 May 2023 09:34), Max: 99.5 (09 May 2023 17:20)  HR: 70 (10 May 2023 11:00) (63 - 102)  BP: 167/84 (10 May 2023 09:00) (167/84 - 167/84)  BP(mean): 110 (10 May 2023 09:00) (110 - 110)  RR: 18 (10 May 2023 11:00) (16 - 21)  SpO2: 100% (10 May 2023 11:00) (93% - 100%)    Parameters below as of 10 May 2023 11:00  Patient On (Oxygen Delivery Method): nasal cannula, high flow  O2 Flow (L/min): 50  O2 Concentration (%): 50    General: NAD, resting comfortably in bed  Pulmonary: Nonlabored breathing, no respiratory distress  CV: NSR  Abd: soft, NT/ND, no guarding  Extremities: (-) edema, warm, well-perfused    I&O's Detail    09 May 2023 07:01  -  10 May 2023 07:00  --------------------------------------------------------  IN:    Albumin 25%  -  50 mL: 50 mL    Albumin 5%  - 250 mL: 500 mL    Dexmedetomidine: 57.3 mL    Fat Emulsion (Fish Oil &amp; Plant Based) 20% Infusion: 227.2 mL    IV PiggyBack: 450 mL    IV PiggyBack: 170 mL    Oral Fluid: 240 mL    sodium chloride 0.9%: 120 mL    TPN (Total Parenteral Nutrition): 550 mL  Total IN: 2364.5 mL    OUT:    Chest Tube (mL): 870 mL    Voided (mL): 1490 mL  Total OUT: 2360 mL    Total NET: 4.5 mL      10 May 2023 07:01  -  10 May 2023 11:15  --------------------------------------------------------  IN:    Dexmedetomidine: 10.5 mL    Fat Emulsion (Fish Oil &amp; Plant Based) 20% Infusion: 41.6 mL    IV PiggyBack: 50 mL    sodium chloride 0.9%: 40 mL    TPN (Total Parenteral Nutrition): 150 mL  Total IN: 292.1 mL    OUT:    Chest Tube (mL): 100 mL    Voided (mL): 150 mL  Total OUT: 250 mL    Total NET: 42.1 mL          LABS:                        9.4    9.88  )-----------( 141      ( 10 May 2023 03:36 )             28.7     05-10    135  |  104  |  18  ----------------------------<  122<H>  4.2   |  27  |  0.97    Ca    8.1<L>      10 May 2023 03:36  Phos  2.9     05-10  Mg     2.1     05-10    TPro  5.1<L>  /  Alb  2.4<L>  /  TBili  0.4  /  DBili  x   /  AST  9<L>  /  ALT  <5<L>  /  AlkPhos  65  05-10    PT/INR - ( 10 May 2023 03:36 )   PT: 16.5 sec;   INR: 1.38          PTT - ( 10 May 2023 03:36 )  PTT:39.8 sec      RADIOLOGY & ADDITIONAL STUDIES:

## 2023-05-11 LAB
ALBUMIN SERPL ELPH-MCNC: 2.5 G/DL — LOW (ref 3.3–5)
ALBUMIN SERPL ELPH-MCNC: 2.7 G/DL — LOW (ref 3.3–5)
ALBUMIN SERPL ELPH-MCNC: 2.8 G/DL — LOW (ref 3.3–5)
ALP SERPL-CCNC: 67 U/L — SIGNIFICANT CHANGE UP (ref 40–120)
ALP SERPL-CCNC: 72 U/L — SIGNIFICANT CHANGE UP (ref 40–120)
ALP SERPL-CCNC: 76 U/L — SIGNIFICANT CHANGE UP (ref 40–120)
ALT FLD-CCNC: <5 U/L — LOW (ref 10–45)
AMYLASE P1 CFR SERPL: 146 U/L — HIGH (ref 25–125)
ANION GAP SERPL CALC-SCNC: 12 MMOL/L — SIGNIFICANT CHANGE UP (ref 5–17)
ANION GAP SERPL CALC-SCNC: 8 MMOL/L — SIGNIFICANT CHANGE UP (ref 5–17)
ANION GAP SERPL CALC-SCNC: 9 MMOL/L — SIGNIFICANT CHANGE UP (ref 5–17)
APTT BLD: 32.8 SEC — SIGNIFICANT CHANGE UP (ref 27.5–35.5)
APTT BLD: 34 SEC — SIGNIFICANT CHANGE UP (ref 27.5–35.5)
APTT BLD: 37.2 SEC — HIGH (ref 27.5–35.5)
AST SERPL-CCNC: 12 U/L — SIGNIFICANT CHANGE UP (ref 10–40)
AST SERPL-CCNC: 17 U/L — SIGNIFICANT CHANGE UP (ref 10–40)
AST SERPL-CCNC: 19 U/L — SIGNIFICANT CHANGE UP (ref 10–40)
BILIRUB SERPL-MCNC: 0.3 MG/DL — SIGNIFICANT CHANGE UP (ref 0.2–1.2)
BUN SERPL-MCNC: 22 MG/DL — SIGNIFICANT CHANGE UP (ref 7–23)
BUN SERPL-MCNC: 27 MG/DL — HIGH (ref 7–23)
BUN SERPL-MCNC: 30 MG/DL — HIGH (ref 7–23)
CALCIUM SERPL-MCNC: 8.3 MG/DL — LOW (ref 8.4–10.5)
CALCIUM SERPL-MCNC: 8.8 MG/DL — SIGNIFICANT CHANGE UP (ref 8.4–10.5)
CALCIUM SERPL-MCNC: 8.8 MG/DL — SIGNIFICANT CHANGE UP (ref 8.4–10.5)
CHLORIDE SERPL-SCNC: 100 MMOL/L — SIGNIFICANT CHANGE UP (ref 96–108)
CHLORIDE SERPL-SCNC: 100 MMOL/L — SIGNIFICANT CHANGE UP (ref 96–108)
CHLORIDE SERPL-SCNC: 101 MMOL/L — SIGNIFICANT CHANGE UP (ref 96–108)
CK SERPL-CCNC: 15 U/L — LOW (ref 30–200)
CO2 SERPL-SCNC: 24 MMOL/L — SIGNIFICANT CHANGE UP (ref 22–31)
CREAT SERPL-MCNC: 1.04 MG/DL — SIGNIFICANT CHANGE UP (ref 0.5–1.3)
CREAT SERPL-MCNC: 1.23 MG/DL — SIGNIFICANT CHANGE UP (ref 0.5–1.3)
CREAT SERPL-MCNC: 1.23 MG/DL — SIGNIFICANT CHANGE UP (ref 0.5–1.3)
EGFR: 70 ML/MIN/1.73M2 — SIGNIFICANT CHANGE UP
EGFR: 70 ML/MIN/1.73M2 — SIGNIFICANT CHANGE UP
EGFR: 85 ML/MIN/1.73M2 — SIGNIFICANT CHANGE UP
GAS PNL BLDA: SIGNIFICANT CHANGE UP
GLUCOSE SERPL-MCNC: 127 MG/DL — HIGH (ref 70–99)
GLUCOSE SERPL-MCNC: 137 MG/DL — HIGH (ref 70–99)
GLUCOSE SERPL-MCNC: 143 MG/DL — HIGH (ref 70–99)
HCT VFR BLD CALC: 30.6 % — LOW (ref 39–50)
HCT VFR BLD CALC: 31.9 % — LOW (ref 39–50)
HCT VFR BLD CALC: 33.8 % — LOW (ref 39–50)
HGB BLD-MCNC: 10 G/DL — LOW (ref 13–17)
HGB BLD-MCNC: 10.6 G/DL — LOW (ref 13–17)
HGB BLD-MCNC: 11.2 G/DL — LOW (ref 13–17)
INR BLD: 1.15 — SIGNIFICANT CHANGE UP (ref 0.88–1.16)
INR BLD: 1.17 — HIGH (ref 0.88–1.16)
INR BLD: 1.2 — HIGH (ref 0.88–1.16)
LACTATE SERPL-SCNC: 0.6 MMOL/L — SIGNIFICANT CHANGE UP (ref 0.5–2)
LIDOCAIN IGE QN: 270 U/L — HIGH (ref 7–60)
LIDOCAIN IGE QN: 79 U/L — HIGH (ref 7–60)
MAGNESIUM SERPL-MCNC: 2.3 MG/DL — SIGNIFICANT CHANGE UP (ref 1.6–2.6)
MAGNESIUM SERPL-MCNC: 2.4 MG/DL — SIGNIFICANT CHANGE UP (ref 1.6–2.6)
MAGNESIUM SERPL-MCNC: 2.5 MG/DL — SIGNIFICANT CHANGE UP (ref 1.6–2.6)
MCHC RBC-ENTMCNC: 29.7 PG — SIGNIFICANT CHANGE UP (ref 27–34)
MCHC RBC-ENTMCNC: 30 PG — SIGNIFICANT CHANGE UP (ref 27–34)
MCHC RBC-ENTMCNC: 30.1 PG — SIGNIFICANT CHANGE UP (ref 27–34)
MCHC RBC-ENTMCNC: 32.7 GM/DL — SIGNIFICANT CHANGE UP (ref 32–36)
MCHC RBC-ENTMCNC: 33.1 GM/DL — SIGNIFICANT CHANGE UP (ref 32–36)
MCHC RBC-ENTMCNC: 33.2 GM/DL — SIGNIFICANT CHANGE UP (ref 32–36)
MCV RBC AUTO: 90.6 FL — SIGNIFICANT CHANGE UP (ref 80–100)
MCV RBC AUTO: 90.6 FL — SIGNIFICANT CHANGE UP (ref 80–100)
MCV RBC AUTO: 90.8 FL — SIGNIFICANT CHANGE UP (ref 80–100)
NRBC # BLD: 0 /100 WBCS — SIGNIFICANT CHANGE UP (ref 0–0)
PHOSPHATE SERPL-MCNC: 3.5 MG/DL — SIGNIFICANT CHANGE UP (ref 2.5–4.5)
PHOSPHATE SERPL-MCNC: 3.9 MG/DL — SIGNIFICANT CHANGE UP (ref 2.5–4.5)
PHOSPHATE SERPL-MCNC: 4.3 MG/DL — SIGNIFICANT CHANGE UP (ref 2.5–4.5)
PLATELET # BLD AUTO: 161 K/UL — SIGNIFICANT CHANGE UP (ref 150–400)
PLATELET # BLD AUTO: 170 K/UL — SIGNIFICANT CHANGE UP (ref 150–400)
PLATELET # BLD AUTO: 179 K/UL — SIGNIFICANT CHANGE UP (ref 150–400)
POTASSIUM SERPL-MCNC: 4.5 MMOL/L — SIGNIFICANT CHANGE UP (ref 3.5–5.3)
POTASSIUM SERPL-MCNC: 4.5 MMOL/L — SIGNIFICANT CHANGE UP (ref 3.5–5.3)
POTASSIUM SERPL-MCNC: 4.6 MMOL/L — SIGNIFICANT CHANGE UP (ref 3.5–5.3)
POTASSIUM SERPL-SCNC: 4.5 MMOL/L — SIGNIFICANT CHANGE UP (ref 3.5–5.3)
POTASSIUM SERPL-SCNC: 4.5 MMOL/L — SIGNIFICANT CHANGE UP (ref 3.5–5.3)
POTASSIUM SERPL-SCNC: 4.6 MMOL/L — SIGNIFICANT CHANGE UP (ref 3.5–5.3)
PROT SERPL-MCNC: 5.8 G/DL — LOW (ref 6–8.3)
PROT SERPL-MCNC: 5.9 G/DL — LOW (ref 6–8.3)
PROT SERPL-MCNC: 6.5 G/DL — SIGNIFICANT CHANGE UP (ref 6–8.3)
PROTHROM AB SERPL-ACNC: 13.7 SEC — HIGH (ref 10.5–13.4)
PROTHROM AB SERPL-ACNC: 13.9 SEC — HIGH (ref 10.5–13.4)
PROTHROM AB SERPL-ACNC: 14.3 SEC — HIGH (ref 10.5–13.4)
RBC # BLD: 3.37 M/UL — LOW (ref 4.2–5.8)
RBC # BLD: 3.52 M/UL — LOW (ref 4.2–5.8)
RBC # BLD: 3.73 M/UL — LOW (ref 4.2–5.8)
RBC # FLD: 14.8 % — HIGH (ref 10.3–14.5)
RBC # FLD: 14.9 % — HIGH (ref 10.3–14.5)
RBC # FLD: 15.3 % — HIGH (ref 10.3–14.5)
SODIUM SERPL-SCNC: 132 MMOL/L — LOW (ref 135–145)
SODIUM SERPL-SCNC: 134 MMOL/L — LOW (ref 135–145)
SODIUM SERPL-SCNC: 136 MMOL/L — SIGNIFICANT CHANGE UP (ref 135–145)
WBC # BLD: 13.68 K/UL — HIGH (ref 3.8–10.5)
WBC # BLD: 14.11 K/UL — HIGH (ref 3.8–10.5)
WBC # BLD: 15.23 K/UL — HIGH (ref 3.8–10.5)
WBC # FLD AUTO: 13.68 K/UL — HIGH (ref 3.8–10.5)
WBC # FLD AUTO: 14.11 K/UL — HIGH (ref 3.8–10.5)
WBC # FLD AUTO: 15.23 K/UL — HIGH (ref 3.8–10.5)

## 2023-05-11 RX ORDER — FENTANYL CITRATE 50 UG/ML
25 INJECTION INTRAVENOUS ONCE
Refills: 0 | Status: DISCONTINUED | OUTPATIENT
Start: 2023-05-11 | End: 2023-05-11

## 2023-05-11 RX ORDER — LABETALOL HCL 100 MG
10 TABLET ORAL ONCE
Refills: 0 | Status: COMPLETED | OUTPATIENT
Start: 2023-05-11 | End: 2023-05-11

## 2023-05-11 RX ORDER — KETOROLAC TROMETHAMINE 30 MG/ML
15 SYRINGE (ML) INJECTION ONCE
Refills: 0 | Status: DISCONTINUED | OUTPATIENT
Start: 2023-05-11 | End: 2023-05-11

## 2023-05-11 RX ORDER — METOPROLOL TARTRATE 50 MG
12.5 TABLET ORAL EVERY 12 HOURS
Refills: 0 | Status: DISCONTINUED | OUTPATIENT
Start: 2023-05-11 | End: 2023-05-12

## 2023-05-11 RX ORDER — ALBUMIN HUMAN 25 %
250 VIAL (ML) INTRAVENOUS ONCE
Refills: 0 | Status: COMPLETED | OUTPATIENT
Start: 2023-05-11 | End: 2023-05-11

## 2023-05-11 RX ORDER — METOCLOPRAMIDE HCL 10 MG
10 TABLET ORAL ONCE
Refills: 0 | Status: COMPLETED | OUTPATIENT
Start: 2023-05-11 | End: 2023-05-11

## 2023-05-11 RX ORDER — ACETAMINOPHEN 500 MG
1000 TABLET ORAL ONCE
Refills: 0 | Status: COMPLETED | OUTPATIENT
Start: 2023-05-11 | End: 2023-05-11

## 2023-05-11 RX ADMIN — Medication 10 MILLIGRAM(S): at 15:49

## 2023-05-11 RX ADMIN — HEPARIN SODIUM 5000 UNIT(S): 5000 INJECTION INTRAVENOUS; SUBCUTANEOUS at 21:36

## 2023-05-11 RX ADMIN — PANTOPRAZOLE SODIUM 40 MILLIGRAM(S): 20 TABLET, DELAYED RELEASE ORAL at 05:39

## 2023-05-11 RX ADMIN — Medication 400 MILLIGRAM(S): at 21:36

## 2023-05-11 RX ADMIN — FENTANYL CITRATE 25 MICROGRAM(S): 50 INJECTION INTRAVENOUS at 17:00

## 2023-05-11 RX ADMIN — Medication 10 MILLIGRAM(S): at 16:45

## 2023-05-11 RX ADMIN — Medication 75 MILLIGRAM(S): at 21:36

## 2023-05-11 RX ADMIN — Medication 75 MILLIGRAM(S): at 05:40

## 2023-05-11 RX ADMIN — LACOSAMIDE 100 MILLIGRAM(S): 50 TABLET ORAL at 18:36

## 2023-05-11 RX ADMIN — FENTANYL CITRATE 25 MICROGRAM(S): 50 INJECTION INTRAVENOUS at 04:12

## 2023-05-11 RX ADMIN — LACOSAMIDE 100 MILLIGRAM(S): 50 TABLET ORAL at 05:39

## 2023-05-11 RX ADMIN — Medication 81 MILLIGRAM(S): at 11:50

## 2023-05-11 RX ADMIN — Medication 12.5 MILLIGRAM(S): at 15:40

## 2023-05-11 RX ADMIN — Medication 400 MILLIGRAM(S): at 15:40

## 2023-05-11 RX ADMIN — Medication 15 MILLIGRAM(S): at 12:00

## 2023-05-11 RX ADMIN — OXYCODONE HYDROCHLORIDE 10 MILLIGRAM(S): 5 TABLET ORAL at 06:12

## 2023-05-11 RX ADMIN — DIVALPROEX SODIUM 1000 MILLIGRAM(S): 500 TABLET, DELAYED RELEASE ORAL at 18:35

## 2023-05-11 RX ADMIN — DIVALPROEX SODIUM 1000 MILLIGRAM(S): 500 TABLET, DELAYED RELEASE ORAL at 05:39

## 2023-05-11 RX ADMIN — FENTANYL CITRATE 25 MICROGRAM(S): 50 INJECTION INTRAVENOUS at 16:45

## 2023-05-11 RX ADMIN — Medication 10 MILLIGRAM(S): at 06:30

## 2023-05-11 RX ADMIN — FENTANYL CITRATE 25 MICROGRAM(S): 50 INJECTION INTRAVENOUS at 03:42

## 2023-05-11 RX ADMIN — OXYCODONE HYDROCHLORIDE 10 MILLIGRAM(S): 5 TABLET ORAL at 05:42

## 2023-05-11 RX ADMIN — Medication 15 MILLIGRAM(S): at 11:00

## 2023-05-11 RX ADMIN — HEPARIN SODIUM 5000 UNIT(S): 5000 INJECTION INTRAVENOUS; SUBCUTANEOUS at 05:40

## 2023-05-11 RX ADMIN — Medication 125 MILLILITER(S): at 18:36

## 2023-05-11 RX ADMIN — Medication 75 MILLIGRAM(S): at 14:35

## 2023-05-11 RX ADMIN — HEPARIN SODIUM 5000 UNIT(S): 5000 INJECTION INTRAVENOUS; SUBCUTANEOUS at 14:34

## 2023-05-11 RX ADMIN — Medication 1000 MILLIGRAM(S): at 16:00

## 2023-05-11 RX ADMIN — Medication 1000 MILLIGRAM(S): at 21:45

## 2023-05-11 RX ADMIN — CHLORHEXIDINE GLUCONATE 1 APPLICATION(S): 213 SOLUTION TOPICAL at 05:40

## 2023-05-11 RX ADMIN — Medication 20 MILLIGRAM(S): at 05:40

## 2023-05-11 RX ADMIN — ATORVASTATIN CALCIUM 20 MILLIGRAM(S): 80 TABLET, FILM COATED ORAL at 21:35

## 2023-05-11 NOTE — PROGRESS NOTE ADULT - SUBJECTIVE AND OBJECTIVE BOX
SUBJECTIVE: Patient seen and evaluated. Denies abdominal pain, n/v this AM. Tolerated cheeseburger yesterday        MEDICATIONS  (STANDING):  albumin human  5% IVPB 250 milliLiter(s) IV Intermittent once  aspirin enteric coated 81 milliGRAM(s) Oral daily  atorvastatin 20 milliGRAM(s) Oral at bedtime  chlorhexidine 2% Cloths 1 Application(s) Topical daily  divalproex DR 1000 milliGRAM(s) Oral every 12 hours  furosemide   Injectable 20 milliGRAM(s) IV Push every 12 hours  heparin   Injectable 5000 Unit(s) SubCutaneous every 8 hours  hydrocortisone sodium succinate Injectable 75 milliGRAM(s) IV Push every 8 hours  lacosamide 100 milliGRAM(s) Oral every 12 hours  metoprolol tartrate 12.5 milliGRAM(s) Oral every 12 hours  pantoprazole    Tablet 40 milliGRAM(s) Oral before breakfast  polyethylene glycol 3350 17 Gram(s) Oral daily  senna 2 Tablet(s) Oral at bedtime  sodium chloride 0.9%. 1000 milliLiter(s) (10 mL/Hr) IV Continuous <Continuous>    MEDICATIONS  (PRN):  acetaminophen     Tablet .. 650 milliGRAM(s) Oral every 6 hours PRN Mild Pain (1 - 3)  oxyCODONE    IR 5 milliGRAM(s) Oral every 6 hours PRN Moderate Pain (4 - 6)  oxyCODONE    IR 10 milliGRAM(s) Oral every 6 hours PRN Severe Pain (7 - 10)      Vital Signs Last 24 Hrs  T(C): 36.2 (11 May 2023 17:50), Max: 36.6 (10 May 2023 21:15)  T(F): 97.1 (11 May 2023 17:50), Max: 97.8 (10 May 2023 21:15)  HR: 75 (11 May 2023 18:00) (75 - 94)  BP: 174/98 (11 May 2023 18:00) (163/98 - 175/96)  BP(mean): 130 (11 May 2023 18:00) (124 - 130)  RR: 20 (11 May 2023 18:00) (16 - 20)  SpO2: 97% (11 May 2023 18:00) (94% - 100%)    Parameters below as of 11 May 2023 18:00  Patient On (Oxygen Delivery Method): room air        Physical Exam:  General: NAD, resting comfortably in bed  Pulmonary: Nonlabored breathing, no respiratory distress  Cardiovascular: NSR  Abdominal: soft, NT/ND      I&O's Summary    10 May 2023 07:01  -  11 May 2023 07:00  --------------------------------------------------------  IN: 667.1 mL / OUT: 955 mL / NET: -287.9 mL    11 May 2023 07:01  -  11 May 2023 18:23  --------------------------------------------------------  IN: 110 mL / OUT: 40 mL / NET: 70 mL        LABS:                        11.2   15.23 )-----------( 179      ( 11 May 2023 14:59 )             33.8     05-11    134<L>  |  101  |  27<H>  ----------------------------<  143<H>  4.5   |  24  |  1.23    Ca    8.8      11 May 2023 14:59  Phos  3.9     05-11  Mg     2.5     05-11    TPro  6.5  /  Alb  2.7<L>  /  TBili  0.3  /  DBili  x   /  AST  17  /  ALT  <5<L>  /  AlkPhos  76  05-11    PT/INR - ( 11 May 2023 14:59 )   PT: 13.7 sec;   INR: 1.15          PTT - ( 11 May 2023 14:59 )  PTT:34.0 sec    CAPILLARY BLOOD GLUCOSE      POCT Blood Glucose.: 111 mg/dL (10 May 2023 22:12)    LIVER FUNCTIONS - ( 11 May 2023 14:59 )  Alb: 2.7 g/dL / Pro: 6.5 g/dL / ALK PHOS: 76 U/L / ALT: <5 U/L / AST: 17 U/L / GGT: x             RADIOLOGY & ADDITIONAL STUDIES:

## 2023-05-11 NOTE — PROGRESS NOTE ADULT - SUBJECTIVE AND OBJECTIVE BOX
CTICU  CRITICAL  CARE  attending     Hand off received 					   Pertinent clinical, laboratory, radiographic, hemodynamic, echocardiographic, respiratory data, microbiologic data and chart were reviewed and analyzed frequently throughout the course of the day and night      52 year old Male with HTN, type A aortic dissection s/p Dacron grafts, AV resuspension in 2013, CAD s/p CABG x 1 SVG to RCA (5/2013 with Dr. Medina), seizure disorder (last episode on 7/4/22)   He is a current every day marijuana user.   He was admitted for surgical management of progression of aneurysmal disease.   On 3/20/23 the patient underwent replacement of transverse aortic arch, second stage thoracic endovascular aortic repair, aorto-axillary bypass, AV replacement (bio 23mm),CABG x 1 (SVG-RCA).   Postoperative course complicated by lactic acidosis, severe cardiogenic and vasogenic shock, TREY requiring CVVHD and temporary dialysis requirement. Eventually discharged 4/14/23.   The patient presented to [facility] Emergency Room 4/27 for syncope vs seizure episode at home, falling backwards on the head.   Neurological workup performed during that visit revealed a negative EEG, but given clinical picture of seizures, he was started on vimpat and depakote.   Imaging preformed during that admission did no necessitate acute surgical intervention on endoleak.   He was readmitted to [facility] complaining of worsening epigastric pain.   CTAP revealed continued endoleak.   He was transferred to [facility] for further evaluation.    S/P Second stage TEVAR.       FAMILY HISTORY:  No pertinent family history in first degree relatives    PAST MEDICAL & SURGICAL HISTORY:  HTN (hypertension)  Aortic dissection  CAD (coronary artery disease)  Seizure disorder  S/P aortic bifurcation bypass graft  H/O  CABG x 1            14 system review was unremarkable    Vital signs, hemodynamic and respiratory parameters were reviewed from the bedside nursing flow sheet.  ICU Vital Signs Last 24 Hrs  T(C): 36.7 (12 May 2023 01:01), Max: 36.7 (12 May 2023 01:01)  T(F): 98 (12 May 2023 01:01), Max: 98 (12 May 2023 01:01)  HR: 86 (12 May 2023 06:00) (69 - 94)  BP: 152/83 (12 May 2023 06:00) (148/80 - 201/100)  BP(mean): 110 (12 May 2023 06:00) (108 - 144)  ABP: 174/93 (11 May 2023 13:00) (151/84 - 174/93)  ABP(mean): 122 (11 May 2023 13:00) (106 - 122)  RR: 17 (12 May 2023 06:00) (16 - 20)  SpO2: 99% (12 May 2023 06:00) (94% - 100%)    O2 Parameters below as of 12 May 2023 07:00  Patient On (Oxygen Delivery Method): room air          Adult Advanced Hemodynamics Last 24 Hrs  CVP(mm Hg): -3 (12 May 2023 06:00) (-3 - 30)  CVP(cm H2O): --  CO: --  CI: --  PA: --  PA(mean): --  PCWP: --  SVR: --  SVRI: --  PVR: --  PVRI: --, ABG - ( 11 May 2023 03:08 )  pH, Arterial: 7.43  pH, Blood: x     /  pCO2: 38    /  pO2: 89    / HCO3: 25    / Base Excess: 1.0   /  SaO2: 98.9                Intake and output was reviewed and the fluid balance was calculated  Daily     Daily   I&O's Summary    10 May 2023 07:01  -  11 May 2023 07:00  --------------------------------------------------------  IN: 667.1 mL / OUT: 955 mL / NET: -287.9 mL    11 May 2023 07:01  -  12 May 2023 06:03  --------------------------------------------------------  IN: 805.8 mL / OUT: 450 mL / NET: 355.8 mL          Neuro: No change in the mental status from the baseline.   Neck: No JVD.  CVS: S1, S2, No S3.  Lungs: Good air entry bilerally.  Abd: Soft. Mild tenderness. + Bowel sounds. Scrotal edema.  Vascular: + DP/PT.  Extremities: Lower extremity edema. edema.  Lymphatic: Normal.  Skin: No abnormalities.      labs  CBC Full  -  ( 12 May 2023 03:55 )  WBC Count : 12.94 K/uL  RBC Count : 3.10 M/uL  Hemoglobin : 9.4 g/dL  Hematocrit : 27.9 %  Platelet Count - Automated : 148 K/uL  Mean Cell Volume : 90.0 fl  Mean Cell Hemoglobin : 30.3 pg  Mean Cell Hemoglobin Concentration : 33.7 gm/dL  Auto Neutrophil # : x  Auto Lymphocyte # : x  Auto Monocyte # : x  Auto Eosinophil # : x  Auto Basophil # : x  Auto Neutrophil % : x  Auto Lymphocyte % : x  Auto Monocyte % : x  Auto Eosinophil % : x  Auto Basophil % : x    05-12    135  |  100  |  31<H>  ----------------------------<  128<H>  4.4   |  26  |  1.21    Ca    8.7      12 May 2023 03:55  Phos  4.2     05-12  Mg     2.2     05-12    TPro  5.8<L>  /  Alb  2.8<L>  /  TBili  0.4  /  DBili  x   /  AST  13  /  ALT  <5<L>  /  AlkPhos  61  05-12    PT/INR - ( 12 May 2023 03:55 )   PT: 14.8 sec;   INR: 1.24          PTT - ( 12 May 2023 03:55 )  PTT:33.5 sec  The current medications were reviewed   MEDICATIONS  (STANDING):  aspirin enteric coated 81 milliGRAM(s) Oral daily  atorvastatin 20 milliGRAM(s) Oral at bedtime  chlorhexidine 2% Cloths 1 Application(s) Topical daily  dexMEDEtomidine Infusion 0.2 MICROgram(s)/kG/Hr (3.5 mL/Hr) IV Continuous <Continuous>  divalproex DR 1000 milliGRAM(s) Oral every 12 hours  heparin   Injectable 5000 Unit(s) SubCutaneous every 8 hours  hydrocortisone sodium succinate Injectable 50 milliGRAM(s) IV Push every 12 hours  labetalol 200 milliGRAM(s) Oral every 6 hours  lacosamide 100 milliGRAM(s) Oral every 12 hours  pantoprazole    Tablet 40 milliGRAM(s) Oral before breakfast  polyethylene glycol 3350 17 Gram(s) Oral daily  senna 2 Tablet(s) Oral at bedtime  sodium chloride 0.9%. 1000 milliLiter(s) (10 mL/Hr) IV Continuous <Continuous>    MEDICATIONS  (PRN):  acetaminophen     Tablet .. 650 milliGRAM(s) Oral every 6 hours PRN Mild Pain (1 - 3)  oxyCODONE    IR 10 milliGRAM(s) Oral every 6 hours PRN Severe Pain (7 - 10)  oxyCODONE    IR 5 milliGRAM(s) Oral every 6 hours PRN Moderate Pain (4 - 6)        53 year old  Male admitted with abdominal pain due to endoleak.  S/P Second Stage TEVAR.  Postoperative course complicated by elevated lipase  CT Head: No acute infarct.  CT Chest: Mild infectious bronchiolitis/bronchopneumonia in the right lung. Multifocal thoracic aortic aneurysm excluded extensive aortic repair.  CT abdomen: The distal body and tail pancreas are diffusely and heterogeneously  enlarged, suspicious for primary pancreatic malignancy. Small irregular pocket of fluid between the stomach and the pancreas   is indeterminate. Potential etiologies include pancreatic pseudocyst, abscess,seroma, resolving hematoma, or loculated ascites.  Hemodynamically stable.  Good oxygenation.  Fair urine out put.        My plan includes :  GI follow up  Statin and Betablocker.  Close hemodynamic monitoring   Taper steroids.   Monitor for arrhythmias and monitor parameters for organ perfusion  Monitor neurologic status  Monitor renal function.  Head of the bed should remain elevated to 45 deg .   Chest PT and IS will be encouraged  Monitor adequacy of oxygenation   Nutritional goals will be met using po eventually , ensure adequate caloric intake and monitor the same  Stress ulcer and VTE prophylaxis will be achieved    Glycemic control is satisfactory  Electrolytes have been repleted as necessary and wound care has been carried out. Pain control has been achieved.   Aggressive physical therapy and early mobility and ambulation goals will be met   The family was updated about the course and plan  CRITICAL CARE TIME SPENT in evaluation and management, review and interpretation of labs and x-rays, hemodynamic management, formulating a plan and coordinating care: ___30____ MIN.  Time does not include procedural time.  CTICU ATTENDING     					    José Miguel Torres MD

## 2023-05-11 NOTE — PROGRESS NOTE ADULT - ASSESSMENT
This is a 52yo Male pt with PMH HTN, type A aortic dissection s/p Dacron grafts, AV resuspension in 2013, CAD s/p CABG x 1 SVG to RCA (5/2013 with Dr. Medina), seizure disorder (last episode on 7/4/22) S/P replacement of transverse aortic arch, second stage thoracic endovascular aortic repair, aorto-axillary bypass, AV replacement (bio 23mm), in APr 2023 and CABG x 1 (SVG-RCA) EF 75% (Ignacio, 3/20) now s/p 2nd state TEVAR on 5/5 for whom surgery was consulted for finding of acute pancreatitis with large peripancreatic/perigastric fluid collections on CTA abdomen on 5/8.    - Appreciate GI recommendations for PO feeding as tolerated.   - Pain improved today, without plan for intervention. Pancreatitis task force will sign off at this time. Please reconsult as necessary

## 2023-05-11 NOTE — PROGRESS NOTE ADULT - SUBJECTIVE AND OBJECTIVE BOX
CTICU  CRITICAL  CARE  attending     Hand off received 					   Pertinent clinical, laboratory, radiographic, hemodynamic, echocardiographic, respiratory data, microbiologic data and chart were reviewed and analyzed frequently throughout the course of the day and night  Patient seen and examined with CTS/ SH attending at bedside  Pt is a 53y , Male, HEALTH ISSUES - PROBLEM Dx:      , FAMILY HISTORY:  No pertinent family history in first degree relatives    PAST MEDICAL & SURGICAL HISTORY:  HTN (hypertension)      Aortic dissection      CAD (coronary artery disease)      Seizure disorder      S/P aortic bifurcation bypass graft      S/P CABG x 1        Patient is a 53y old  Male who presents with a chief complaint of endoleak, abdominal pain (12 May 2023 08:50)      14 system review was unremarkable    Vital signs, hemodynamic and respiratory parameters were reviewed from the bedside nursing flowsheet.  ICU Vital Signs Last 24 Hrs  T(C): 36.9 (12 May 2023 09:17), Max: 36.9 (12 May 2023 09:17)  T(F): 98.4 (12 May 2023 09:17), Max: 98.4 (12 May 2023 09:17)  HR: 90 (12 May 2023 07:00) (69 - 94)  BP: 157/98 (12 May 2023 10:00) (148/80 - 201/100)  BP(mean): 121 (12 May 2023 10:00) (108 - 144)  ABP: 174/93 (11 May 2023 13:00) (160/92 - 174/93)  ABP(mean): 122 (11 May 2023 13:00) (116 - 122)  RR: 18 (12 May 2023 10:00) (16 - 20)  SpO2: 89% (12 May 2023 10:00) (89% - 99%)    O2 Parameters below as of 12 May 2023 10:00  Patient On (Oxygen Delivery Method): room air          Adult Advanced Hemodynamics Last 24 Hrs  CVP(mm Hg): 2 (12 May 2023 07:00) (-3 - 30)  CVP(cm H2O): --  CO: --  CI: --  PA: --  PA(mean): --  PCWP: --  SVR: --  SVRI: --  PVR: --  PVRI: --, ABG - ( 11 May 2023 03:08 )  pH, Arterial: 7.43  pH, Blood: x     /  pCO2: 38    /  pO2: 89    / HCO3: 25    / Base Excess: 1.0   /  SaO2: 98.9                Intake and output was reviewed and the fluid balance was calculated  Daily     Daily   I&O's Summary    11 May 2023 07:01  -  12 May 2023 07:00  --------------------------------------------------------  IN: 925.8 mL / OUT: 480 mL / NET: 445.8 mL    12 May 2023 07:01  -  12 May 2023 11:08  --------------------------------------------------------  IN: 10 mL / OUT: 0 mL / NET: 10 mL        All lines and drain sites were assessed  [facility]      POCT Blood Glucose.: 111 mg/dL (10 May 2023 22:12)    No acute change in mental status  Auscultation of the chest reveals equal bs  Abdomen is soft  Extremities are warm and well perfused  Wounds appear clean and unremarkable  Antibiotics are periop    labs  CBC Full  -  ( 12 May 2023 03:55 )  WBC Count : 12.94 K/uL  RBC Count : 3.10 M/uL  Hemoglobin : 9.4 g/dL  Hematocrit : 27.9 %  Platelet Count - Automated : 148 K/uL  Mean Cell Volume : 90.0 fl  Mean Cell Hemoglobin : 30.3 pg  Mean Cell Hemoglobin Concentration : 33.7 gm/dL  Auto Neutrophil # : x  Auto Lymphocyte # : x  Auto Monocyte # : x  Auto Eosinophil # : x  Auto Basophil # : x  Auto Neutrophil % : x  Auto Lymphocyte % : x  Auto Monocyte % : x  Auto Eosinophil % : x  Auto Basophil % : x    05-12    135  |  100  |  31<H>  ----------------------------<  128<H>  4.4   |  26  |  1.21    Ca    8.7      12 May 2023 03:55  Phos  4.2     05-12  Mg     2.2     05-12    TPro  5.8<L>  /  Alb  2.8<L>  /  TBili  0.4  /  DBili  x   /  AST  13  /  ALT  <5<L>  /  AlkPhos  61  05-12    PT/INR - ( 12 May 2023 03:55 )   PT: 14.8 sec;   INR: 1.24          PTT - ( 12 May 2023 03:55 )  PTT:33.5 sec  The current medications were reviewed   MEDICATIONS  (STANDING):  aspirin enteric coated 81 milliGRAM(s) Oral daily  atorvastatin 20 milliGRAM(s) Oral at bedtime  chlorhexidine 2% Cloths 1 Application(s) Topical daily  dexMEDEtomidine Infusion 0.2 MICROgram(s)/kG/Hr (3.5 mL/Hr) IV Continuous <Continuous>  divalproex DR 1000 milliGRAM(s) Oral every 12 hours  heparin   Injectable 5000 Unit(s) SubCutaneous every 8 hours  hydrocortisone sodium succinate Injectable 50 milliGRAM(s) IV Push every 12 hours  HYDROmorphone  Injectable 0.5 milliGRAM(s) IV Push once  labetalol 200 milliGRAM(s) Oral every 6 hours  lacosamide 100 milliGRAM(s) Oral every 12 hours  pantoprazole    Tablet 40 milliGRAM(s) Oral before breakfast  polyethylene glycol 3350 17 Gram(s) Oral daily  senna 2 Tablet(s) Oral at bedtime  sodium chloride 0.9%. 1000 milliLiter(s) (10 mL/Hr) IV Continuous <Continuous>    MEDICATIONS  (PRN):  acetaminophen     Tablet .. 650 milliGRAM(s) Oral every 6 hours PRN Mild Pain (1 - 3)  oxyCODONE    IR 5 milliGRAM(s) Oral every 6 hours PRN Moderate Pain (4 - 6)  oxyCODONE    IR 10 milliGRAM(s) Oral every 6 hours PRN Severe Pain (7 - 10)       PROBLEM LIST/ ASSESSMENT:  HEALTH ISSUES - PROBLEM Dx:      ,   Patient is a 53y old  Male who presents with a chief complaint of endoleak, abdominal pain (12 May 2023 08:50)     s/p cardiac surgery    chronic pancreatitis with pseudocyst formation    Hypovolemic shock - > 20% intravascular depletion will replete volume      Toxic metabolic encephalopathy ; sundowning due to anesthesia pain medications              My plan includes :    pan CT to rule our acute concern for abdominal pain       close hemodynamic, ventilatory and drain monitoring and management per post op routine    Monitor for arrhythmias and monitor parameters for organ perfusion  beta blockade not administered due to hemodynamic instability and bradycardia  monitor neurologic status  Head of the bed should remain elevated to 45 deg .   chest PT and IS will be encouraged  monitor adequacy of oxygenation and ventilation and attempt to wean oxygen  antibiotic regimen will be tailored to the clinical, laboratory and microbiologic data  Nutritional goals will be met using po eventually , ensure adequate caloric intake and montior the same  Stress ulcer and VTE prophylaxis will be achieved    Glycemic control is satisfactory  Electrolytes have been repleted as necessary and wound care has been carried out. Pain control has been achieved.   agressive physical therapy and early mobility and ambulation goals will be met   The family was updated about the course and plan  CRITICAL CARE TIME Upon my evaluation, this patient had a high probability of imminent or life-threatening deterioration due to the above problems which required my direct attention, intervention, and personal management.  I have personally provided 110 minutes of critical care time exclusive of time spent on separately billable procedures. Time included review of laboratory data, radiology results, discussion with consultants, and monitoring for potential decompensation. Interventions were performed as documented abovepersonally provided by me  in evaluation and management, reassessments, review and interpretation of labs and x-rays, ventilator and hemodynamic management, formulating a plan and coordinating care: ___110___ MIN.  Time does not include procedural time.    CTICU ATTENDING     					    Bubba Craft MD

## 2023-05-12 LAB
ALBUMIN SERPL ELPH-MCNC: 2.8 G/DL — LOW (ref 3.3–5)
ALBUMIN SERPL ELPH-MCNC: 2.9 G/DL — LOW (ref 3.3–5)
ALP SERPL-CCNC: 58 U/L — SIGNIFICANT CHANGE UP (ref 40–120)
ALP SERPL-CCNC: 61 U/L — SIGNIFICANT CHANGE UP (ref 40–120)
ALT FLD-CCNC: <5 U/L — LOW (ref 10–45)
ALT FLD-CCNC: <5 U/L — LOW (ref 10–45)
AMYLASE P1 CFR SERPL: 132 U/L — HIGH (ref 25–125)
ANION GAP SERPL CALC-SCNC: 12 MMOL/L — SIGNIFICANT CHANGE UP (ref 5–17)
ANION GAP SERPL CALC-SCNC: 9 MMOL/L — SIGNIFICANT CHANGE UP (ref 5–17)
APTT BLD: 31.1 SEC — SIGNIFICANT CHANGE UP (ref 27.5–35.5)
APTT BLD: 33.5 SEC — SIGNIFICANT CHANGE UP (ref 27.5–35.5)
AST SERPL-CCNC: 13 U/L — SIGNIFICANT CHANGE UP (ref 10–40)
AST SERPL-CCNC: 15 U/L — SIGNIFICANT CHANGE UP (ref 10–40)
BILIRUB SERPL-MCNC: 0.4 MG/DL — SIGNIFICANT CHANGE UP (ref 0.2–1.2)
BILIRUB SERPL-MCNC: 0.5 MG/DL — SIGNIFICANT CHANGE UP (ref 0.2–1.2)
BLD GP AB SCN SERPL QL: NEGATIVE — SIGNIFICANT CHANGE UP
BUN SERPL-MCNC: 31 MG/DL — HIGH (ref 7–23)
BUN SERPL-MCNC: 38 MG/DL — HIGH (ref 7–23)
CALCIUM SERPL-MCNC: 8.5 MG/DL — SIGNIFICANT CHANGE UP (ref 8.4–10.5)
CALCIUM SERPL-MCNC: 8.7 MG/DL — SIGNIFICANT CHANGE UP (ref 8.4–10.5)
CHLORIDE SERPL-SCNC: 100 MMOL/L — SIGNIFICANT CHANGE UP (ref 96–108)
CHLORIDE SERPL-SCNC: 101 MMOL/L — SIGNIFICANT CHANGE UP (ref 96–108)
CO2 SERPL-SCNC: 22 MMOL/L — SIGNIFICANT CHANGE UP (ref 22–31)
CO2 SERPL-SCNC: 26 MMOL/L — SIGNIFICANT CHANGE UP (ref 22–31)
CREAT SERPL-MCNC: 1.21 MG/DL — SIGNIFICANT CHANGE UP (ref 0.5–1.3)
CREAT SERPL-MCNC: 1.56 MG/DL — HIGH (ref 0.5–1.3)
EGFR: 52 ML/MIN/1.73M2 — LOW
EGFR: 71 ML/MIN/1.73M2 — SIGNIFICANT CHANGE UP
GAS PNL BLDA: SIGNIFICANT CHANGE UP
GLUCOSE BLDC GLUCOMTR-MCNC: 170 MG/DL — HIGH (ref 70–99)
GLUCOSE SERPL-MCNC: 128 MG/DL — HIGH (ref 70–99)
GLUCOSE SERPL-MCNC: 193 MG/DL — HIGH (ref 70–99)
HCT VFR BLD CALC: 20.5 % — CRITICAL LOW (ref 39–50)
HCT VFR BLD CALC: 22.3 % — LOW (ref 39–50)
HCT VFR BLD CALC: 27.9 % — LOW (ref 39–50)
HGB BLD-MCNC: 6.6 G/DL — CRITICAL LOW (ref 13–17)
HGB BLD-MCNC: 7.2 G/DL — LOW (ref 13–17)
HGB BLD-MCNC: 9.4 G/DL — LOW (ref 13–17)
INR BLD: 1.24 — HIGH (ref 0.88–1.16)
INR BLD: 1.42 — HIGH (ref 0.88–1.16)
LACTATE SERPL-SCNC: 1.8 MMOL/L — SIGNIFICANT CHANGE UP (ref 0.5–2)
LIDOCAIN IGE QN: 218 U/L — HIGH (ref 7–60)
MAGNESIUM SERPL-MCNC: 2.2 MG/DL — SIGNIFICANT CHANGE UP (ref 1.6–2.6)
MAGNESIUM SERPL-MCNC: 2.2 MG/DL — SIGNIFICANT CHANGE UP (ref 1.6–2.6)
MCHC RBC-ENTMCNC: 29.9 PG — SIGNIFICANT CHANGE UP (ref 27–34)
MCHC RBC-ENTMCNC: 30 PG — SIGNIFICANT CHANGE UP (ref 27–34)
MCHC RBC-ENTMCNC: 30.3 PG — SIGNIFICANT CHANGE UP (ref 27–34)
MCHC RBC-ENTMCNC: 32.2 GM/DL — SIGNIFICANT CHANGE UP (ref 32–36)
MCHC RBC-ENTMCNC: 32.3 GM/DL — SIGNIFICANT CHANGE UP (ref 32–36)
MCHC RBC-ENTMCNC: 33.7 GM/DL — SIGNIFICANT CHANGE UP (ref 32–36)
MCV RBC AUTO: 90 FL — SIGNIFICANT CHANGE UP (ref 80–100)
MCV RBC AUTO: 92.5 FL — SIGNIFICANT CHANGE UP (ref 80–100)
MCV RBC AUTO: 93.2 FL — SIGNIFICANT CHANGE UP (ref 80–100)
NRBC # BLD: 0 /100 WBCS — SIGNIFICANT CHANGE UP (ref 0–0)
PHOSPHATE SERPL-MCNC: 4.2 MG/DL — SIGNIFICANT CHANGE UP (ref 2.5–4.5)
PHOSPHATE SERPL-MCNC: 6.2 MG/DL — HIGH (ref 2.5–4.5)
PLATELET # BLD AUTO: 145 K/UL — LOW (ref 150–400)
PLATELET # BLD AUTO: 148 K/UL — LOW (ref 150–400)
PLATELET # BLD AUTO: 171 K/UL — SIGNIFICANT CHANGE UP (ref 150–400)
POTASSIUM SERPL-MCNC: 4.4 MMOL/L — SIGNIFICANT CHANGE UP (ref 3.5–5.3)
POTASSIUM SERPL-MCNC: 5.2 MMOL/L — SIGNIFICANT CHANGE UP (ref 3.5–5.3)
POTASSIUM SERPL-SCNC: 4.4 MMOL/L — SIGNIFICANT CHANGE UP (ref 3.5–5.3)
POTASSIUM SERPL-SCNC: 5.2 MMOL/L — SIGNIFICANT CHANGE UP (ref 3.5–5.3)
PROT SERPL-MCNC: 5.7 G/DL — LOW (ref 6–8.3)
PROT SERPL-MCNC: 5.8 G/DL — LOW (ref 6–8.3)
PROTHROM AB SERPL-ACNC: 14.8 SEC — HIGH (ref 10.5–13.4)
PROTHROM AB SERPL-ACNC: 17 SEC — HIGH (ref 10.5–13.4)
RBC # BLD: 2.2 M/UL — LOW (ref 4.2–5.8)
RBC # BLD: 2.41 M/UL — LOW (ref 4.2–5.8)
RBC # BLD: 3.1 M/UL — LOW (ref 4.2–5.8)
RBC # FLD: 14.9 % — HIGH (ref 10.3–14.5)
RBC # FLD: 15.1 % — HIGH (ref 10.3–14.5)
RBC # FLD: 15.2 % — HIGH (ref 10.3–14.5)
RH IG SCN BLD-IMP: POSITIVE — SIGNIFICANT CHANGE UP
SODIUM SERPL-SCNC: 135 MMOL/L — SIGNIFICANT CHANGE UP (ref 135–145)
SODIUM SERPL-SCNC: 135 MMOL/L — SIGNIFICANT CHANGE UP (ref 135–145)
WBC # BLD: 12.94 K/UL — HIGH (ref 3.8–10.5)
WBC # BLD: 14.97 K/UL — HIGH (ref 3.8–10.5)
WBC # BLD: 16.87 K/UL — HIGH (ref 3.8–10.5)
WBC # FLD AUTO: 12.94 K/UL — HIGH (ref 3.8–10.5)
WBC # FLD AUTO: 14.97 K/UL — HIGH (ref 3.8–10.5)
WBC # FLD AUTO: 16.87 K/UL — HIGH (ref 3.8–10.5)

## 2023-05-12 PROCEDURE — 99254 IP/OBS CNSLTJ NEW/EST MOD 60: CPT | Mod: GC

## 2023-05-12 RX ORDER — LABETALOL HCL 100 MG
10 TABLET ORAL ONCE
Refills: 0 | Status: COMPLETED | OUTPATIENT
Start: 2023-05-12 | End: 2023-05-12

## 2023-05-12 RX ORDER — ALBUMIN HUMAN 25 %
250 VIAL (ML) INTRAVENOUS
Refills: 0 | Status: DISCONTINUED | OUTPATIENT
Start: 2023-05-12 | End: 2023-05-13

## 2023-05-12 RX ORDER — ONDANSETRON 8 MG/1
4 TABLET, FILM COATED ORAL ONCE
Refills: 0 | Status: COMPLETED | OUTPATIENT
Start: 2023-05-12 | End: 2023-05-12

## 2023-05-12 RX ORDER — FENTANYL CITRATE 50 UG/ML
25 INJECTION INTRAVENOUS ONCE
Refills: 0 | Status: DISCONTINUED | OUTPATIENT
Start: 2023-05-12 | End: 2023-05-12

## 2023-05-12 RX ORDER — DEXMEDETOMIDINE HYDROCHLORIDE IN 0.9% SODIUM CHLORIDE 4 UG/ML
0.2 INJECTION INTRAVENOUS
Qty: 400 | Refills: 0 | Status: DISCONTINUED | OUTPATIENT
Start: 2023-05-12 | End: 2023-05-15

## 2023-05-12 RX ORDER — ALBUMIN HUMAN 25 %
250 VIAL (ML) INTRAVENOUS ONCE
Refills: 0 | Status: COMPLETED | OUTPATIENT
Start: 2023-05-12 | End: 2023-05-12

## 2023-05-12 RX ORDER — HYDROMORPHONE HYDROCHLORIDE 2 MG/ML
0.5 INJECTION INTRAMUSCULAR; INTRAVENOUS; SUBCUTANEOUS ONCE
Refills: 0 | Status: DISCONTINUED | OUTPATIENT
Start: 2023-05-12 | End: 2023-05-12

## 2023-05-12 RX ORDER — LABETALOL HCL 100 MG
200 TABLET ORAL EVERY 6 HOURS
Refills: 0 | Status: DISCONTINUED | OUTPATIENT
Start: 2023-05-12 | End: 2023-05-12

## 2023-05-12 RX ADMIN — HYDROMORPHONE HYDROCHLORIDE 0.5 MILLIGRAM(S): 2 INJECTION INTRAMUSCULAR; INTRAVENOUS; SUBCUTANEOUS at 16:48

## 2023-05-12 RX ADMIN — LACOSAMIDE 100 MILLIGRAM(S): 50 TABLET ORAL at 06:56

## 2023-05-12 RX ADMIN — Medication 50 MILLIGRAM(S): at 06:57

## 2023-05-12 RX ADMIN — Medication 50 MILLIGRAM(S): at 18:20

## 2023-05-12 RX ADMIN — Medication 125 MILLILITER(S): at 17:36

## 2023-05-12 RX ADMIN — FENTANYL CITRATE 25 MICROGRAM(S): 50 INJECTION INTRAVENOUS at 02:06

## 2023-05-12 RX ADMIN — Medication 250 MILLILITER(S): at 20:58

## 2023-05-12 RX ADMIN — HEPARIN SODIUM 5000 UNIT(S): 5000 INJECTION INTRAVENOUS; SUBCUTANEOUS at 06:57

## 2023-05-12 RX ADMIN — DIVALPROEX SODIUM 1000 MILLIGRAM(S): 500 TABLET, DELAYED RELEASE ORAL at 18:21

## 2023-05-12 RX ADMIN — Medication 200 MILLIGRAM(S): at 02:15

## 2023-05-12 RX ADMIN — Medication 125 MILLILITER(S): at 01:30

## 2023-05-12 RX ADMIN — LACOSAMIDE 100 MILLIGRAM(S): 50 TABLET ORAL at 18:22

## 2023-05-12 RX ADMIN — Medication 81 MILLIGRAM(S): at 13:25

## 2023-05-12 RX ADMIN — DEXMEDETOMIDINE HYDROCHLORIDE IN 0.9% SODIUM CHLORIDE 3.5 MICROGRAM(S)/KG/HR: 4 INJECTION INTRAVENOUS at 02:38

## 2023-05-12 RX ADMIN — Medication 2 MILLIGRAM(S): at 19:15

## 2023-05-12 RX ADMIN — CHLORHEXIDINE GLUCONATE 1 APPLICATION(S): 213 SOLUTION TOPICAL at 06:57

## 2023-05-12 RX ADMIN — ONDANSETRON 4 MILLIGRAM(S): 8 TABLET, FILM COATED ORAL at 09:45

## 2023-05-12 RX ADMIN — Medication 200 MILLIGRAM(S): at 14:39

## 2023-05-12 RX ADMIN — DIVALPROEX SODIUM 1000 MILLIGRAM(S): 500 TABLET, DELAYED RELEASE ORAL at 06:57

## 2023-05-12 RX ADMIN — Medication 10 MILLIGRAM(S): at 00:25

## 2023-05-12 RX ADMIN — PANTOPRAZOLE SODIUM 40 MILLIGRAM(S): 20 TABLET, DELAYED RELEASE ORAL at 06:57

## 2023-05-12 RX ADMIN — HEPARIN SODIUM 5000 UNIT(S): 5000 INJECTION INTRAVENOUS; SUBCUTANEOUS at 14:31

## 2023-05-12 RX ADMIN — HYDROMORPHONE HYDROCHLORIDE 0.5 MILLIGRAM(S): 2 INJECTION INTRAMUSCULAR; INTRAVENOUS; SUBCUTANEOUS at 14:31

## 2023-05-12 RX ADMIN — Medication 200 MILLIGRAM(S): at 07:01

## 2023-05-12 RX ADMIN — POLYETHYLENE GLYCOL 3350 17 GRAM(S): 17 POWDER, FOR SOLUTION ORAL at 13:25

## 2023-05-12 RX ADMIN — FENTANYL CITRATE 25 MICROGRAM(S): 50 INJECTION INTRAVENOUS at 03:00

## 2023-05-12 NOTE — CONSULT NOTE ADULT - ASSESSMENT
**INCOMPLETE**    54yo Male pt with PMH HTN, type A aortic dissection s/p Dacron grafts, AV resuspension in 2013, CAD s/p CABG x 1 SVG to RCA (5/2013 with Dr. Medina), seizure disorder (last episode on 7/4/22) S/P replacement of transverse aortic arch, second stage thoracic endovascular aortic repair, aorto-axillary bypass, AV replacement (bio 23mm), in APr 2023 and CABG x 1 (SVG-RCA) EF 75% (Ignacio, 3/20) now s/p 2nd state TEVAR on 5/5 for whom surgery was consulted for finding of acute pancreatitis with large peripancreatic/perigastric fluid collections on CTA abdomen on 5/8.     54yo Male pt with PMH HTN, type A aortic dissection s/p Dacron grafts, AV resuspension in 2013, CAD s/p CABG x 1 SVG to RCA (5/2013 with Dr. Medina), seizure disorder (last episode on 7/4/22) S/P replacement of transverse aortic arch, second stage thoracic endovascular aortic repair, aorto-axillary bypass, AV replacement (bio 23mm), in APr 2023 and CABG x 1 (SVG-RCA) EF 75% ([place], 3/20) now s/p 2nd state TEVAR on 5/5 for whom surgery was consulted for finding of acute pancreatitis with large peripancreatic/perigastric fluid collections on CTA abdomen on 5/8. Epilepsy team consulted after patient presenting with an episode of altered mental status, described by PA of facial twitching and blinking for 5 minutos that resolved after placed on Ativan 2 mg IV. After that patient has been lethargic progressively improving but still very unresponsive. No new seizure described by primary team.     Impression: Breakthrough provoked seizure in postop (severe anemia, electrolytes derangements, TREY over CKD, recent surgery) s/p Ativan IV in patient with PHMx of epilepsy well controlled. Another option is syncope s/p TME. Rule out subclinic seizures/non-convulsive status    PLAN:  ABC and stabilize vitals per primary team.   Continue with AED lacosamide 100 mg BID and Divalproex ER 1000 mg BID  Ordered vEEG  Utox and routine labs including ammonia, TSH, Mg and Phos, lactate.  Serum AED level, valproic acid and Vimpat.    Seizure & Fall precautions  Ativan 2mg IVP for seizures > 2mins  Keep Mg>2 and K >4.   Management per primary team.     Please call us if any questions or neurological changes.     Case discussed with epilepsy attending Dr. Alves 54yo Male pt with PMH HTN, type A aortic dissection s/p Dacron grafts, AV resuspension in 2013, CAD s/p CABG x 1 SVG to RCA (5/2013 with Dr. Medina), seizure disorder (last episode on 7/4/22) S/P replacement of transverse aortic arch, second stage thoracic endovascular aortic repair, aorto-axillary bypass, AV replacement (bio 23mm), in APr 2023 and CABG x 1 (SVG-RCA) EF 75% (Ignacio, 3/20) now s/p 2nd state TEVAR on 5/5 for whom surgery was consulted for finding of acute pancreatitis with large peripancreatic/perigastric fluid collections on CTA abdomen on 5/8. Epilepsy team consulted after patient presenting with an episode of altered mental status, described by PA of facial twitching and blinking for 5 minutos that resolved after placed on Ativan 2 mg IV. After that patient has been lethargic progressively improving but still very unresponsive. No new seizure described by primary team.     Impression: Breakthrough provoked seizure in postop (severe anemia, electrolytes derangements, TREY over CKD, recent surgery) s/p Ativan IV in patient with PHMx of epilepsy well controlled. Another option is syncope 2/2 TME (severe anemia, electrolytes derangements, TREY over CKD, recent surgery). Rule out subclinic seizures/non-convulsive status    PLAN:  ABC and stabilize vitals per primary team.   Continue with AED lacosamide 100 mg BID and Divalproex ER 1000 mg BID  Ordered vEEG  Utox and routine labs including ammonia, TSH, Mg and Phos, lactate.  Serum AED level, valproic acid and Vimpat.    Seizure & Fall precautions  Ativan 2mg IVP for seizures > 2mins  Keep Mg>2 and K >4.   Management per primary team.     Please call us if any questions or neurological changes.     Case discussed with epilepsy attending Dr. Alves

## 2023-05-12 NOTE — CONSULT NOTE ADULT - SUBJECTIVE AND OBJECTIVE BOX
HPI  53y Male     Epilepsy risk factors:  Head injury with subsequent LOC:  Febrile seizures in infancy:  Hx of CNS infection:  Family hx of epilepsy:  Known CNS pathology:     Prior AEDs      Prior imaging     Prior EEGs     Other diagnostic work-up     Review of Systems:  CONSTITUTIONAL:  No weight loss, fever, chills, weakness or fatigue.  HEENT:  Eyes:  No visual loss, blurred vision, double vision or yellow sclerae. Ears, Nose, Throat:  No hearing loss, sneezing, congestion, runny nose or sore throat.  SKIN:  No rash or itching.  CARDIOVASCULAR:  No chest pain, chest pressure or chest discomfort. No palpitations or edema.  RESPIRATORY:  No shortness of breath, cough or sputum.  GASTROINTESTINAL:  No anorexia, nausea, vomiting or diarrhea. No abdominal pain or blood.  GENITOURINARY: No Burning on urination.   NEUROLOGICAL:  see HPI  MUSCULOSKELETAL:  No muscle, back pain, joint pain or stiffness.  HEMATOLOGIC:  No anemia, bleeding or bruising.  LYMPHATICS:  No enlarged nodes. No history of splenectomy.  PSYCHIATRIC:  No history of depression or anxiety.  ENDOCRINOLOGIC:  No reports of sweating, cold or heat intolerance. No polyuria or polydipsia.  ALLERGIES:  No history of asthma, hives, eczema or rhinitis.       PAST MEDICAL & SURGICAL HISTORY:  HTN (hypertension)      Aortic dissection      CAD (coronary artery disease)      Seizure disorder      S/P aortic bifurcation bypass graft      S/P CABG x 1           FAMILY HISTORY:  No pertinent family history in first degree relatives         Social History:  Alcohol, illicits, smoking:  Driving:  Occupation:     Allergies  No Known Allergies       MEDICATIONS  (STANDING):  aspirin enteric coated 81 milliGRAM(s) Oral daily  atorvastatin 20 milliGRAM(s) Oral at bedtime  chlorhexidine 2% Cloths 1 Application(s) Topical daily  dexMEDEtomidine Infusion 0.2 MICROgram(s)/kG/Hr (3.5 mL/Hr) IV Continuous <Continuous>  divalproex DR 1000 milliGRAM(s) Oral every 12 hours  heparin   Injectable 5000 Unit(s) SubCutaneous every 8 hours  labetalol 200 milliGRAM(s) Oral every 6 hours  lacosamide 100 milliGRAM(s) Oral every 12 hours  pantoprazole    Tablet 40 milliGRAM(s) Oral before breakfast  polyethylene glycol 3350 17 Gram(s) Oral daily  senna 2 Tablet(s) Oral at bedtime  sodium chloride 0.9%. 1000 milliLiter(s) (10 mL/Hr) IV Continuous <Continuous>    MEDICATIONS  (PRN):  acetaminophen     Tablet .. 650 milliGRAM(s) Oral every 6 hours PRN Mild Pain (1 - 3)  oxyCODONE    IR 5 milliGRAM(s) Oral every 6 hours PRN Moderate Pain (4 - 6)  oxyCODONE    IR 10 milliGRAM(s) Oral every 6 hours PRN Severe Pain (7 - 10)       T(C): 36.6 (05-12-23 @ 16:29), Max: 36.9 (05-12-23 @ 09:17)  HR: 104 (05-12-23 @ 19:15) (72 - 121)  BP: 102/67 (05-12-23 @ 19:15) (98/71 - 201/100)  RR: 18 (05-12-23 @ 19:15) (16 - 19)  SpO2: 100% (05-12-23 @ 19:15) (89% - 100%)  Wt(kg): --    Constitutional:  Sitting comfortably in NAD.  Psychiatric: calm, normal affect, no overt anxiety or internal preoccupation  Ears, Nose, Throat: no abnormalities, mucus membranes moist  Neck: supple, no lymphadenopathy or nodules palpable  Cardiovascular: regular rate and rhythm, normal S1/S2, no murmurs   Chest: Clear to bases. 	  Abdomen: soft, non-tender, no hepatosplenomegaly   Extremities: no edema, clubbing or cyanosis  Skin: no rash or neurocutaneous signs   Neurological  -Cognitive: Orientation, language, memory and knowledge screens intact. Registration: 	4/4		Serial 7’s: normal		Recall 4/4  -Cranial Nerves: II: Full to confrontation. III/IV/VI: PERRL EOMF No nystagmus. V1V2V3: Symmetric, VII: Face appears symmetric VIII: Normal to screening, IX/X: Palate -Elevates Symmetrical  XI: Trapezius Symmetric  XII: Tongue midline  -Motor: Power: 5/5 throughout, tone: normal x 4 limbs, no tremor   -Sensation: Intact to light touch. Intact to pinprick/temperature and vibration.  -Coordination/Gait: Finger-nose-finger intact, normal rapid-alternating movements. Fine motor normal with normal rapid finger taps and heel-toe tapping, narrow based gait, tandem forward and back ok, hops well on both feet, heel and toe walking normal   -Reflexes: DTR: 2+ symmetric all 4 limbs, no clonus. Plantar responses: Down bilaterally     Labs  CBC Full  -  ( 12 May 2023 19:16 )  WBC Count : 16.87 K/uL  RBC Count : 2.41 M/uL  Hemoglobin : 7.2 g/dL  Hematocrit : 22.3 %  Platelet Count - Automated : 171 K/uL  Mean Cell Volume : 92.5 fl  Mean Cell Hemoglobin : 29.9 pg  Mean Cell Hemoglobin Concentration : 32.3 gm/dL  Auto Neutrophil # : x  Auto Lymphocyte # : x  Auto Monocyte # : x  Auto Eosinophil # : x  Auto Basophil # : x  Auto Neutrophil % : x  Auto Lymphocyte % : x  Auto Monocyte % : x  Auto Eosinophil % : x  Auto Basophil % : x    05-12    135  |  100  |  31<H>  ----------------------------<  128<H>  4.4   |  26  |  1.21    Ca    8.7      12 May 2023 03:55  Phos  4.2     05-12  Mg     2.2     05-12    TPro  5.8<L>  /  Alb  2.8<L>  /  TBili  0.4  /  DBili  x   /  AST  13  /  ALT  <5<L>  /  AlkPhos  61  05-12    LIVER FUNCTIONS - ( 12 May 2023 03:55 )  Alb: 2.8 g/dL / Pro: 5.8 g/dL / ALK PHOS: 61 U/L / ALT: <5 U/L / AST: 13 U/L / GGT: x           PT/INR - ( 12 May 2023 03:55 )   PT: 14.8 sec;   INR: 1.24          PTT - ( 12 May 2023 03:55 )  PTT:33.5 sec      EEG: HPI  53 YO Male, current every day marijuana use, w/ PMHx of HTN, type A aortic dissection s/p Dacron grafts, AV resuspension in 2013, CAD s/p CABG x 1 SVG to RCA (5/2013 with Dr. Medina), seizure disorder (last episode on 7/4/22) who was admitted for surgical mgmt of progression of aneurysmal disease. Recent admission 3/20-4/14 during which patient underwent replacement of transverse aortic arch, second stage thoracic endovascular aortic repair, aorto-axillary bypass, AV replacement (bio 23mm), and CABG x 1 (SVG-RCA) EF 75% (Ignacio, 3/20). Post op cb lactic acidosis, severe cardiogenic and vasogenic shock, TREY requiring CVVHD and temporary dialysis requirement. Patient presented to [facility] ED 4/27 for syncope vs seizure episode at home, falling onto back of head. Nuerological workup proformed during that visit revealed a negative EEG, but given clinical picture of seizures, pt started on vimpat and depakote. Imaging preformed during that admission did no require acute surgical intervention on endoleak.   Pt presented to [facility] ED last night complaning of worsening epigastric pain. CTAP revealed continued endoleak. Sent to [facility] for further evaluation. Pt will be taken to the OR with Dr. Pierre this morning for a completion TEVAR. Pt consented, OR aware.      Epilepsy risk factors:  Head injury with subsequent LOC:  Febrile seizures in infancy:  Hx of CNS infection:  Family hx of epilepsy:  Known CNS pathology:     Prior AEDs      Prior imaging     Prior EEGs     Other diagnostic work-up     Review of Systems:  CONSTITUTIONAL:  No weight loss, fever, chills, weakness or fatigue.  HEENT:  Eyes:  No visual loss, blurred vision, double vision or yellow sclerae. Ears, Nose, Throat:  No hearing loss, sneezing, congestion, runny nose or sore throat.  SKIN:  No rash or itching.  CARDIOVASCULAR:  No chest pain, chest pressure or chest discomfort. No palpitations or edema.  RESPIRATORY:  No shortness of breath, cough or sputum.  GASTROINTESTINAL:  No anorexia, nausea, vomiting or diarrhea. No abdominal pain or blood.  GENITOURINARY: No Burning on urination.   NEUROLOGICAL:  see HPI  MUSCULOSKELETAL:  No muscle, back pain, joint pain or stiffness.  HEMATOLOGIC:  No anemia, bleeding or bruising.  LYMPHATICS:  No enlarged nodes. No history of splenectomy.  PSYCHIATRIC:  No history of depression or anxiety.  ENDOCRINOLOGIC:  No reports of sweating, cold or heat intolerance. No polyuria or polydipsia.  ALLERGIES:  No history of asthma, hives, eczema or rhinitis.       PAST MEDICAL & SURGICAL HISTORY:  HTN (hypertension)      Aortic dissection      CAD (coronary artery disease)      Seizure disorder      S/P aortic bifurcation bypass graft      S/P CABG x 1           FAMILY HISTORY:  No pertinent family history in first degree relatives         Social History:  Alcohol, illicits, smoking:  Driving:  Occupation:     Allergies  No Known Allergies       MEDICATIONS  (STANDING):  aspirin enteric coated 81 milliGRAM(s) Oral daily  atorvastatin 20 milliGRAM(s) Oral at bedtime  chlorhexidine 2% Cloths 1 Application(s) Topical daily  dexMEDEtomidine Infusion 0.2 MICROgram(s)/kG/Hr (3.5 mL/Hr) IV Continuous <Continuous>  divalproex DR 1000 milliGRAM(s) Oral every 12 hours  heparin   Injectable 5000 Unit(s) SubCutaneous every 8 hours  labetalol 200 milliGRAM(s) Oral every 6 hours  lacosamide 100 milliGRAM(s) Oral every 12 hours  pantoprazole    Tablet 40 milliGRAM(s) Oral before breakfast  polyethylene glycol 3350 17 Gram(s) Oral daily  senna 2 Tablet(s) Oral at bedtime  sodium chloride 0.9%. 1000 milliLiter(s) (10 mL/Hr) IV Continuous <Continuous>    MEDICATIONS  (PRN):  acetaminophen     Tablet .. 650 milliGRAM(s) Oral every 6 hours PRN Mild Pain (1 - 3)  oxyCODONE    IR 5 milliGRAM(s) Oral every 6 hours PRN Moderate Pain (4 - 6)  oxyCODONE    IR 10 milliGRAM(s) Oral every 6 hours PRN Severe Pain (7 - 10)       T(C): 36.6 (05-12-23 @ 16:29), Max: 36.9 (05-12-23 @ 09:17)  HR: 104 (05-12-23 @ 19:15) (72 - 121)  BP: 102/67 (05-12-23 @ 19:15) (98/71 - 201/100)  RR: 18 (05-12-23 @ 19:15) (16 - 19)  SpO2: 100% (05-12-23 @ 19:15) (89% - 100%)  Wt(kg): --    Constitutional:  Sitting comfortably in NAD.  Psychiatric: calm, normal affect, no overt anxiety or internal preoccupation  Ears, Nose, Throat: no abnormalities, mucus membranes moist  Neck: supple, no lymphadenopathy or nodules palpable  Cardiovascular: regular rate and rhythm, normal S1/S2, no murmurs   Chest: Clear to bases. 	  Abdomen: soft, non-tender, no hepatosplenomegaly   Extremities: no edema, clubbing or cyanosis  Skin: no rash or neurocutaneous signs   Neurological  -Cognitive: Orientation, language, memory and knowledge screens intact. Registration: 	4/4		Serial 7’s: normal		Recall 4/4  -Cranial Nerves: II: Full to confrontation. III/IV/VI: PERRL EOMF No nystagmus. V1V2V3: Symmetric, VII: Face appears symmetric VIII: Normal to screening, IX/X: Palate -Elevates Symmetrical  XI: Trapezius Symmetric  XII: Tongue midline  -Motor: Power: 5/5 throughout, tone: normal x 4 limbs, no tremor   -Sensation: Intact to light touch. Intact to pinprick/temperature and vibration.  -Coordination/Gait: Finger-nose-finger intact, normal rapid-alternating movements. Fine motor normal with normal rapid finger taps and heel-toe tapping, narrow based gait, tandem forward and back ok, hops well on both feet, heel and toe walking normal   -Reflexes: DTR: 2+ symmetric all 4 limbs, no clonus. Plantar responses: Down bilaterally     Labs  CBC Full  -  ( 12 May 2023 19:16 )  WBC Count : 16.87 K/uL  RBC Count : 2.41 M/uL  Hemoglobin : 7.2 g/dL  Hematocrit : 22.3 %  Platelet Count - Automated : 171 K/uL  Mean Cell Volume : 92.5 fl  Mean Cell Hemoglobin : 29.9 pg  Mean Cell Hemoglobin Concentration : 32.3 gm/dL  Auto Neutrophil # : x  Auto Lymphocyte # : x  Auto Monocyte # : x  Auto Eosinophil # : x  Auto Basophil # : x  Auto Neutrophil % : x  Auto Lymphocyte % : x  Auto Monocyte % : x  Auto Eosinophil % : x  Auto Basophil % : x    05-12    135  |  100  |  31<H>  ----------------------------<  128<H>  4.4   |  26  |  1.21    Ca    8.7      12 May 2023 03:55  Phos  4.2     05-12  Mg     2.2     05-12    TPro  5.8<L>  /  Alb  2.8<L>  /  TBili  0.4  /  DBili  x   /  AST  13  /  ALT  <5<L>  /  AlkPhos  61  05-12    LIVER FUNCTIONS - ( 12 May 2023 03:55 )  Alb: 2.8 g/dL / Pro: 5.8 g/dL / ALK PHOS: 61 U/L / ALT: <5 U/L / AST: 13 U/L / GGT: x           PT/INR - ( 12 May 2023 03:55 )   PT: 14.8 sec;   INR: 1.24          PTT - ( 12 May 2023 03:55 )  PTT:33.5 sec      EEG: HPI  51 YO Male, current every day marijuana use, w/ PMHx of HTN, type A aortic dissection s/p Dacron grafts, AV resuspension in 2013, CAD s/p CABG x 1 SVG to RCA (5/2013 with Dr. Medina), seizure disorder (last episode on 7/4/22) who was admitted for surgical mgmt of progression of aneurysmal disease. Recent admission 3/20-4/14 during which patient underwent replacement of transverse aortic arch, second stage thoracic endovascular aortic repair, aorto-axillary bypass, AV replacement (bio 23mm), and CABG x 1 (SVG-RCA) EF 75% (Ignacio, 3/20). Post op cb lactic acidosis, severe cardiogenic and vasogenic shock, TREY requiring CVVHD and temporary dialysis requirement. Patient presented to [facility] ED 4/27 for syncope vs seizure episode at home, falling onto back of head. Nuerological workup proformed during that visit revealed a negative EEG, but given clinical picture of seizures, pt started on vimpat and depakote. Imaging preformed during that admission did no require acute surgical intervention on endoleak.   Pt presented to [facility] ED last night complaning of worsening epigastric pain. CTAP revealed continued endoleak. Sent to [facility] for further evaluation. Pt will be taken to the OR with Dr. Pierre this morning for a completion TEVAR. Pt consented, OR aware.     Epilepsy team consulted after patient presenting with an episode of altered mental status, described by PA     Review of Systems:  CONSTITUTIONAL:  No weight loss, fever, chills, weakness or fatigue.  HEENT:  Eyes:  No visual loss, blurred vision, double vision or yellow sclerae. Ears, Nose, Throat:  No hearing loss, sneezing, congestion, runny nose or sore throat.  SKIN:  No rash or itching.  CARDIOVASCULAR:  No chest pain, chest pressure or chest discomfort. No palpitations or edema.  RESPIRATORY:  No shortness of breath, cough or sputum.  GASTROINTESTINAL:  No anorexia, nausea, vomiting or diarrhea. No abdominal pain or blood.  GENITOURINARY: No Burning on urination.   NEUROLOGICAL:  see HPI  MUSCULOSKELETAL:  No muscle, back pain, joint pain or stiffness.  HEMATOLOGIC:  No anemia, bleeding or bruising.  LYMPHATICS:  No enlarged nodes. No history of splenectomy.  PSYCHIATRIC:  No history of depression or anxiety.  ENDOCRINOLOGIC:  No reports of sweating, cold or heat intolerance. No polyuria or polydipsia.  ALLERGIES:  No history of asthma, hives, eczema or rhinitis.       PAST MEDICAL & SURGICAL HISTORY:  HTN (hypertension)  Aortic dissection  CAD (coronary artery disease)  Seizure disorder  S/P aortic bifurcation bypass graft  S/P CABG x 1     Allergies  No Known Allergies    MEDICATIONS  (STANDING):  aspirin enteric coated 81 milliGRAM(s) Oral daily  atorvastatin 20 milliGRAM(s) Oral at bedtime  chlorhexidine 2% Cloths 1 Application(s) Topical daily  dexMEDEtomidine Infusion 0.2 MICROgram(s)/kG/Hr (3.5 mL/Hr) IV Continuous <Continuous>  divalproex DR 1000 milliGRAM(s) Oral every 12 hours  heparin   Injectable 5000 Unit(s) SubCutaneous every 8 hours  labetalol 200 milliGRAM(s) Oral every 6 hours  lacosamide 100 milliGRAM(s) Oral every 12 hours  pantoprazole    Tablet 40 milliGRAM(s) Oral before breakfast  polyethylene glycol 3350 17 Gram(s) Oral daily  senna 2 Tablet(s) Oral at bedtime  sodium chloride 0.9%. 1000 milliLiter(s) (10 mL/Hr) IV Continuous <Continuous>    MEDICATIONS  (PRN):  acetaminophen     Tablet .. 650 milliGRAM(s) Oral every 6 hours PRN Mild Pain (1 - 3)  oxyCODONE    IR 5 milliGRAM(s) Oral every 6 hours PRN Moderate Pain (4 - 6)  oxyCODONE    IR 10 milliGRAM(s) Oral every 6 hours PRN Severe Pain (7 - 10)     T(C): 36.6 (05-12-23 @ 16:29), Max: 36.9 (05-12-23 @ 09:17)  HR: 104 (05-12-23 @ 19:15) (72 - 121)  BP: 102/67 (05-12-23 @ 19:15) (98/71 - 201/100)  RR: 18 (05-12-23 @ 19:15) (16 - 19)  SpO2: 100% (05-12-23 @ 19:15) (89% - 100%)  Wt(kg): --    Constitutional:  Lying in bed  Neurological  -Mental status: Non responsive to words, responsive to tactile and pain stimuli mildly opening his eyes and grimacing  -Respiratory: Regular pattern  -Brainstem reflexes: Sluggish corneal reflexes. Threaten to blink reflexes preserved L>R. Oculocephalic reflexes present. Gag reflex present.   -Sensation-Motor: Power: Minimal contraction to pain  -Tone: Diminished  -Reflexes: DTR: 1+ symmetric all 4 limbs, no clonus. Plantar responses: Down bilaterally     Labs  CBC Full  -  ( 12 May 2023 19:16 )  WBC Count : 16.87 K/uL  RBC Count : 2.41 M/uL  Hemoglobin : 7.2 g/dL  Hematocrit : 22.3 %  Platelet Count - Automated : 171 K/uL  Mean Cell Volume : 92.5 fl  Mean Cell Hemoglobin : 29.9 pg  Mean Cell Hemoglobin Concentration : 32.3 gm/dL  Auto Neutrophil # : x  Auto Lymphocyte # : x  Auto Monocyte # : x  Auto Eosinophil # : x  Auto Basophil # : x  Auto Neutrophil % : x  Auto Lymphocyte % : x  Auto Monocyte % : x  Auto Eosinophil % : x  Auto Basophil % : x    05-12    135  |  100  |  31<H>  ----------------------------<  128<H>  4.4   |  26  |  1.21    Ca    8.7      12 May 2023 03:55  Phos  4.2     05-12  Mg     2.2     05-12    TPro  5.8<L>  /  Alb  2.8<L>  /  TBili  0.4  /  DBili  x   /  AST  13  /  ALT  <5<L>  /  AlkPhos  61  05-12    LIVER FUNCTIONS - ( 12 May 2023 03:55 )  Alb: 2.8 g/dL / Pro: 5.8 g/dL / ALK PHOS: 61 U/L / ALT: <5 U/L / AST: 13 U/L / GGT: x           PT/INR - ( 12 May 2023 03:55 )   PT: 14.8 sec;   INR: 1.24          PTT - ( 12 May 2023 03:55 )  PTT:33.5 sec      EEG: HPI  53 YO Male, current every day marijuana use, w/ PMHx of HTN, type A aortic dissection s/p Dacron grafts, AV resuspension in 2013, CAD s/p CABG x 1 SVG to RCA (5/2013 with Dr. Medina), seizure disorder (last episode on 7/4/22) who was admitted for surgical mgmt of progression of aneurysmal disease. Recent admission 3/20-4/14 during which patient underwent replacement of transverse aortic arch, second stage thoracic endovascular aortic repair, aorto-axillary bypass, AV replacement (bio 23mm), and CABG x 1 (SVG-RCA) EF 75% (Ignacio, 3/20). Post op cb lactic acidosis, severe cardiogenic and vasogenic shock, TREY requiring CVVHD and temporary dialysis requirement. Patient presented to [facility] ED 4/27 for syncope vs seizure episode at home, falling onto back of head. Nuerological workup proformed during that visit revealed a negative EEG, but given clinical picture of seizures, pt started on vimpat and depakote. Imaging preformed during that admission did no require acute surgical intervention on endoleak.   Pt presented to [facility] ED last night complaning of worsening epigastric pain. CTAP revealed continued endoleak. Sent to [facility] for further evaluation. Pt will be taken to the OR with Dr. Pierre this morning for a completion TEVAR. Pt consented, OR aware.     Epilepsy team consulted after patient presenting with an episode of altered mental status, described by PA of facial twitching and blinking for 5 minutos that resolved after placed on Ativan 2 mg IV. After that patient has been lethargic progressively improving but still very unresponsive. No new seizure described by primary team.      Review of Systems:  CONSTITUTIONAL:  No weight loss, fever, chills, weakness or fatigue.  HEENT:  Eyes:  No visual loss, blurred vision, double vision or yellow sclerae. Ears, Nose, Throat:  No hearing loss, sneezing, congestion, runny nose or sore throat.  SKIN:  No rash or itching.  CARDIOVASCULAR:  No chest pain, chest pressure or chest discomfort. No palpitations or edema.  RESPIRATORY:  No shortness of breath, cough or sputum.  GASTROINTESTINAL:  No anorexia, nausea, vomiting or diarrhea. No abdominal pain or blood.  GENITOURINARY: No Burning on urination.   NEUROLOGICAL:  see HPI  MUSCULOSKELETAL:  No muscle, back pain, joint pain or stiffness.  HEMATOLOGIC:  No anemia, bleeding or bruising.  LYMPHATICS:  No enlarged nodes. No history of splenectomy.  PSYCHIATRIC:  No history of depression or anxiety.  ENDOCRINOLOGIC:  No reports of sweating, cold or heat intolerance. No polyuria or polydipsia.  ALLERGIES:  No history of asthma, hives, eczema or rhinitis.       PAST MEDICAL & SURGICAL HISTORY:  HTN (hypertension)  Aortic dissection  CAD (coronary artery disease)  Seizure disorder  S/P aortic bifurcation bypass graft  S/P CABG x 1     Allergies  No Known Allergies    MEDICATIONS  (STANDING):  aspirin enteric coated 81 milliGRAM(s) Oral daily  atorvastatin 20 milliGRAM(s) Oral at bedtime  chlorhexidine 2% Cloths 1 Application(s) Topical daily  dexMEDEtomidine Infusion 0.2 MICROgram(s)/kG/Hr (3.5 mL/Hr) IV Continuous <Continuous>  divalproex DR 1000 milliGRAM(s) Oral every 12 hours  heparin   Injectable 5000 Unit(s) SubCutaneous every 8 hours  labetalol 200 milliGRAM(s) Oral every 6 hours  lacosamide 100 milliGRAM(s) Oral every 12 hours  pantoprazole    Tablet 40 milliGRAM(s) Oral before breakfast  polyethylene glycol 3350 17 Gram(s) Oral daily  senna 2 Tablet(s) Oral at bedtime  sodium chloride 0.9%. 1000 milliLiter(s) (10 mL/Hr) IV Continuous <Continuous>    MEDICATIONS  (PRN):  acetaminophen     Tablet .. 650 milliGRAM(s) Oral every 6 hours PRN Mild Pain (1 - 3)  oxyCODONE    IR 5 milliGRAM(s) Oral every 6 hours PRN Moderate Pain (4 - 6)  oxyCODONE    IR 10 milliGRAM(s) Oral every 6 hours PRN Severe Pain (7 - 10)     T(C): 36.6 (05-12-23 @ 16:29), Max: 36.9 (05-12-23 @ 09:17)  HR: 104 (05-12-23 @ 19:15) (72 - 121)  BP: 102/67 (05-12-23 @ 19:15) (98/71 - 201/100)  RR: 18 (05-12-23 @ 19:15) (16 - 19)  SpO2: 100% (05-12-23 @ 19:15) (89% - 100%)  Wt(kg): --    Constitutional:  Lying in bed  Neurological  -Mental status: Non responsive to words, responsive to tactile and pain stimuli mildly opening his eyes and grimacing  -Respiratory: Regular pattern  -Brainstem reflexes: Sluggish corneal reflexes. Threaten to blink reflexes preserved L>R. Oculocephalic reflexes present. Gag reflex present.   -Sensation-Motor: Power: Minimal contraction to pain  -Tone: Diminished  -Reflexes: DTR: 1+ symmetric all 4 limbs, no clonus. Plantar responses: Down bilaterally     Labs  CBC Full  -  ( 12 May 2023 19:16 )  WBC Count : 16.87 K/uL  RBC Count : 2.41 M/uL  Hemoglobin : 7.2 g/dL  Hematocrit : 22.3 %  Platelet Count - Automated : 171 K/uL  Mean Cell Volume : 92.5 fl  Mean Cell Hemoglobin : 29.9 pg  Mean Cell Hemoglobin Concentration : 32.3 gm/dL  Auto Neutrophil # : x  Auto Lymphocyte # : x  Auto Monocyte # : x  Auto Eosinophil # : x  Auto Basophil # : x  Auto Neutrophil % : x  Auto Lymphocyte % : x  Auto Monocyte % : x  Auto Eosinophil % : x  Auto Basophil % : x    05-12    135  |  100  |  31<H>  ----------------------------<  128<H>  4.4   |  26  |  1.21    Ca    8.7      12 May 2023 03:55  Phos  4.2     05-12  Mg     2.2     05-12    TPro  5.8<L>  /  Alb  2.8<L>  /  TBili  0.4  /  DBili  x   /  AST  13  /  ALT  <5<L>  /  AlkPhos  61  05-12    LIVER FUNCTIONS - ( 12 May 2023 03:55 )  Alb: 2.8 g/dL / Pro: 5.8 g/dL / ALK PHOS: 61 U/L / ALT: <5 U/L / AST: 13 U/L / GGT: x           PT/INR - ( 12 May 2023 03:55 )   PT: 14.8 sec;   INR: 1.24          PTT - ( 12 May 2023 03:55 )  PTT:33.5 sec      EEG:

## 2023-05-12 NOTE — PROGRESS NOTE ADULT - ASSESSMENT
52 M w/ PMHx of HTN, type A aortic dissection s/p Dacron grafts, AV resuspension in 2013, CAD s/p CABG x 1 SVG to RCA (5/2013 with Dr. Medina), seizure disorder who was admitted for surgical mgmt of progression of aneurysmal disease. Recent admission 3/20-4/14 during which patient underwent replacement of transverse aortic arch, second stage TEVAR and aorto-axillary bypass, AV replacement (bio 23mm), and CABG x 1 (SVG-RCA) EF 75% (Ignacio, 3/20). Post op cb severe cardiogenic and vasogenic shock, TREY requiring CVVHD and temporary dialysis. Camr off RRT and discharged home mid April.presented to [facility] ED 4/27 for syncope vs seizure episode at home. negative neuro work up for Seizures AED dose adjusted however imaging at that rime revealed recent graft endoleak, Admitted now for surgical mgmt.   S/p Second stage thoracic endovascular aortic repair  5/5 transient LE weakness, Improved   LD clamped and Dc's 5/8  worsening leukocytosis- CT C/A/P suggestive of acute pancreatitis with 2 fluid collections / Lipase on admission > 1000 down to 95 5/8   A/p  ? Seizure around 7 pm, received ativan, scheduled pm Depakote and Vimpat given earlier, epilepsy called for consideration of Ceribell and recs   Hypotensive with sig drop in H&H from 10.6--> 6.6/ No overt bleeding but concern fro acute bleed in the setting of recent aortic graft vs acute hemorrhagic pancreatitis given risk of splenic artery/vein pseudoaneurysm   Cr rising with low UOP, Hemodynamic related in the setting of ? bleeding- Will transfuse 2 PCs and start vasopressin for higher MAPs and renal perfusion  Would line up and stabilize patient- Plan for stat CXR and CT abd/pelvis non con     ATTENDING: I have personally and independently provided 30 Min of critical care services.  52 M w/ PMHx of HTN, type A aortic dissection s/p Dacron grafts, AV resuspension in 2013, CAD s/p CABG x 1 SVG to RCA (5/2013 with Dr. Medina), seizure disorder who was admitted for surgical mgmt of progression of aneurysmal disease. Recent admission 3/20-4/14 during which patient underwent replacement of transverse aortic arch, second stage TEVAR and aorto-axillary bypass, AV replacement (bio 23mm), and CABG x 1 (SVG-RCA) EF 75% (Ignacio, 3/20). Post op cb severe cardiogenic and vasogenic shock, TREY requiring CVVHD and temporary dialysis. Camr off RRT and discharged home mid April.presented to [facility] ED 4/27 for syncope vs seizure episode at home. negative neuro work up for Seizures AED dose adjusted however imaging at that rime revealed recent graft endoleak, Admitted now for surgical mgmt.   S/p Second stage thoracic endovascular aortic repair  5/5 transient LE weakness, Improved   LD clamped and Dc's 5/8  worsening leukocytosis- CT C/A/P suggestive of acute pancreatitis with 2 fluid collections / Lipase on admission > 1000 down to 95 5/8   A/p  ? Seizure around 7 pm, received ativan, scheduled pm Depakote and Vimpat given earlier, epilepsy called for consideration of Ceribell and recs   Hypotensive with sig drop in H&H from 10.6--> 6.6/ No overt bleeding but concern fro acute bleed in the setting of recent aortic graft vs acute hemorrhagic pancreatitis  Cr rising with low UOP, Hemodynamic related in the setting of ? bleeding- Will transfuse 2 PCs and start vasopressin for higher MAPs and renal perfusion  Would line up and stabilize patient- Plan for stat CXR and CT abd/pelvis non con     ATTENDING: I have personally and independently provided 30 Min of critical care services.  52 M w/ PMHx of HTN, type A aortic dissection s/p Dacron grafts, AV resuspension in 2013, CAD s/p CABG x 1 SVG to RCA (5/2013 with Dr. Medina), seizure disorder who was admitted for surgical mgmt of progression of aneurysmal disease. Recent admission 3/20-4/14 during which patient underwent replacement of transverse aortic arch, second stage TEVAR and aorto-axillary bypass, AV replacement (bio 23mm), and CABG x 1 (SVG-RCA) EF 75% (Ignacio, 3/20). Post op cb severe cardiogenic and vasogenic shock, TREY requiring CVVHD and temporary dialysis. Camr off RRT and discharged home mid April.presented to [facility] ED 4/27 for syncope vs seizure episode at home. negative neuro work up for Seizures AED dose adjusted however imaging at that rime revealed recent graft endoleak, Admitted now for surgical mgmt.   S/p Second stage thoracic endovascular aortic repair  5/5 transient LE weakness, Improved   LD clamped and Dc's 5/8  worsening leukocytosis- CT C/A/P suggestive of acute pancreatitis with 2 fluid collections / Lipase on admission > 1000 down to 95 5/8   A/p  ? Seizure around 7 pm, received ativan, scheduled pm Depakote and Vimpat given earlier, epilepsy called for consideration of Ceribell and recs   Hypotensive with sig drop in H&H from 10.6--> 6.6/ No overt bleeding but concern fro acute bleed in the setting of recent aortic graft vs acute hemorrhagic pancreatitis/ possible splenic artery erosion  Cr rising with low UOP, Hemodynamic related in the setting of ? bleeding- Will transfuse 2 PCs and start vasopressin for higher MAPs and renal perfusion  Would line up and stabilize patient- Plan for stat CXR and CT abd/pelvis non con     ATTENDING: I have personally and independently provided 30 Min of critical care services.

## 2023-05-12 NOTE — CHART NOTE - NSCHARTNOTEFT_GEN_A_CORE
The "preliminary report" from the CT scan abdomen from 5/11/23 was read overnight by the [facility] as "primary pancreatic malignancy of the pancreatic head".  Spoke to Dr. Betts today who put the official reading, he did not feel like this looks like primary metastatic malignancy, however recommends a repeat contrast imaging (CT or MRI) as an out-patient after he gets discharged.  Spoke to Gen Surg who also reviewed the updated imaging and report.  They agree with out-patient imaging follow-up.

## 2023-05-12 NOTE — PROGRESS NOTE ADULT - SUBJECTIVE AND OBJECTIVE BOX
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 Recent admission 3/20-4/14 during which patient underwent replacement of transverse aortic arch, second stage thoracic endovascular aortic repair, aorto-axillary bypass, AV replacement (bio 23mm), and CABG x 1 (SVG-RCA) EF 75% (Ignacio, 3/20). Post op cb lactic acidosis, severe cardiogenic and vasogenic shock, TREY requiring CVVHD and temporary dialysis requirement. Recent admission 4/27-4/30 for seizure episode, were his AEDS were adjusted. Presented to [facility] ED 5/4 for epigastric pain. CT exam which showed known endoleak for which pt was tx to [facility]. Patient underwent TEVAR with Dr. Pierre 5/5, LD placed prior by NSGY. Arrived intubated on propofol. 5/6 extubated. 1 unit pRBC, CTH performed for decreased LE mvmt. Improved later in day. 5/7 LD clamped and patient ambulated, plan for dc. 5/8 US showing groin seroma and hematoma. CT scan showing necrotizing pancreatitis. Zosyn started and Gen surg consulted. Recommended IR and GI consults and further evaluation with imaging. Gi believes 2/2 insults from surgeries back in March and recommended outpatient fu. 5/9 new RIJ and PA catheter placed, R pigtail placed cb pneumothorax. 5/11 gen surg signed off. Repeat imaging showing concern for possible malignancy in pancreatic tail.     FAMILY HISTORY:  No pertinent family history in first degree relatives  PAST MEDICAL & SURGICAL HISTORY:  HTN (hypertension)  Aortic dissection  CAD (coronary artery disease)  Seizure disorder  ortic bifurcation bypass graft  S/P CABG x 1        Patient is a 53y old  Male who presents with a chief complaint of endoleak, abdominal pain (11 May 2023 23:02)      14 system review was unremarkable    Vital signs, hemodynamic and respiratory parameters were reviewed from the bedside nursing flowsheet.  ICU Vital Signs Last 24 Hrs  T(C): 36.6 (12 May 2023 05:01), Max: 36.7 (12 May 2023 01:01)  T(F): 97.9 (12 May 2023 05:01), Max: 98 (12 May 2023 01:01)  HR: 90 (12 May 2023 07:00) (69 - 94)  BP: 160/93 (12 May 2023 07:00) (148/80 - 201/100)  BP(mean): 121 (12 May 2023 07:00) (108 - 144)  ABP: 174/93 (11 May 2023 13:00) (151/84 - 174/93)  ABP(mean): 122 (11 May 2023 13:00) (106 - 122)  RR: 16 (12 May 2023 07:00) (16 - 20)  SpO2: 98% (12 May 2023 07:00) (94% - 99%)    O2 Parameters below as of 12 May 2023 08:00  Patient On (Oxygen Delivery Method): room air          Adult Advanced Hemodynamics Last 24 Hrs  CVP(mm Hg): 2 (12 May 2023 07:00) (-3 - 30)  CVP(cm H2O): --  CO: --  CI: --  PA: --  PA(mean): --  PCWP: --  SVR: --  SVRI: --  PVR: --  PVRI: --, ABG - ( 11 May 2023 03:08 )  pH, Arterial: 7.43  pH, Blood: x     /  pCO2: 38    /  pO2: 89    / HCO3: 25    / Base Excess: 1.0   /  SaO2: 98.9                Intake and output was reviewed and the fluid balance was calculated  Daily     Daily   I&O's Summary    11 May 2023 07:01  -  12 May 2023 07:00  --------------------------------------------------------  IN: 925.8 mL / OUT: 480 mL / NET: 445.8 mL    12 May 2023 07:01  -  12 May 2023 08:50  --------------------------------------------------------  IN: 10 mL / OUT: 0 mL / NET: 10 mL        All lines and drain sites were assessed  [facility]      POCT Blood Glucose.: 111 mg/dL (10 May 2023 22:12)      Neuro: appears uncomfortable  HEENT: dry  Heart: s1 s2  Lungs: clear bl  Abdomen: soft mild tenderness, nondistended  Extremities: wwp    Lines:  RIJ 5/9    Tubes:  R pigtail 5/9    labs  CBC Full  -  ( 12 May 2023 03:55 )  WBC Count : 12.94 K/uL  RBC Count : 3.10 M/uL  Hemoglobin : 9.4 g/dL  Hematocrit : 27.9 %  Platelet Count - Automated : 148 K/uL  Mean Cell Volume : 90.0 fl  Mean Cell Hemoglobin : 30.3 pg  Mean Cell Hemoglobin Concentration : 33.7 gm/dL  Auto Neutrophil # : x  Auto Lymphocyte # : x  Auto Monocyte # : x  Auto Eosinophil # : x  Auto Basophil # : x  Auto Neutrophil % : x  Auto Lymphocyte % : x  Auto Monocyte % : x  Auto Eosinophil % : x  Auto Basophil % : x    05-12    135  |  100  |  31<H>  ----------------------------<  128<H>  4.4   |  26  |  1.21    Ca    8.7      12 May 2023 03:55  Phos  4.2     05-12  Mg     2.2     05-12    TPro  5.8<L>  /  Alb  2.8<L>  /  TBili  0.4  /  DBili  x   /  AST  13  /  ALT  <5<L>  /  AlkPhos  61  05-12    PT/INR - ( 12 May 2023 03:55 )   PT: 14.8 sec;   INR: 1.24          PTT - ( 12 May 2023 03:55 )  PTT:33.5 sec  The current medications were reviewed   MEDICATIONS  (STANDING):  aspirin enteric coated 81 milliGRAM(s) Oral daily  atorvastatin 20 milliGRAM(s) Oral at bedtime  chlorhexidine 2% Cloths 1 Application(s) Topical daily  dexMEDEtomidine Infusion 0.2 MICROgram(s)/kG/Hr (3.5 mL/Hr) IV Continuous <Continuous>  divalproex DR 1000 milliGRAM(s) Oral every 12 hours  heparin   Injectable 5000 Unit(s) SubCutaneous every 8 hours  hydrocortisone sodium succinate Injectable 50 milliGRAM(s) IV Push every 12 hours  labetalol 200 milliGRAM(s) Oral every 6 hours  lacosamide 100 milliGRAM(s) Oral every 12 hours  ondansetron Injectable 4 milliGRAM(s) IV Push once  pantoprazole    Tablet 40 milliGRAM(s) Oral before breakfast  polyethylene glycol 3350 17 Gram(s) Oral daily  senna 2 Tablet(s) Oral at bedtime  sodium chloride 0.9%. 1000 milliLiter(s) (10 mL/Hr) IV Continuous <Continuous>    MEDICATIONS  (PRN):  acetaminophen     Tablet .. 650 milliGRAM(s) Oral every 6 hours PRN Mild Pain (1 - 3)  oxyCODONE    IR 5 milliGRAM(s) Oral every 6 hours PRN Moderate Pain (4 - 6)  oxyCODONE    IR 10 milliGRAM(s) Oral every 6 hours PRN Severe Pain (7 - 10)      Assessment/Plan:  53yr old male with PMH HTN, type A dissection sp Dacron grafts and AV resuspension (2013), CAD sp CABG x 1 ([facility], 5/2013, SVG-RCA), seizures, admitted for surgical mgmt of progression of aneurysmal disease. Recent admission 3/20-4/14 during which patient underwent replacement of transverse aortic arch, second stage thoracic endovascular aortic repair, aorto-axillary bypass, AV replacement (bio 23mm), and CABG x 1 (SVG-RCA) EF 75% (Ignacio, 3/20). Post op cb lactic acidosis, severe cardiogenic and vasogenic shock, TREY requiring CVVHD and temporary dialysis requirement. Recent admission 4/27-4/30 for seizure episode, were his AEDS were adjusted. Presented to [facility] ED 5/4 for epigastric pain. CT exam which showed known endoleak for which pt was tx to [facility]. Patient underwent TEVAR with Dr. Pierre 5/5, JASSON placed prior by NSGY. Arrived intubated on propofol. Given 2 units intraop, 1L IVF, 100cc urine output.    POD7 TEVAR (Ignacio, 5/5)  Necrotizing pancreatitis  Anti-emetics prn  Diet as tolerated  Concern for possible malignancy-further imaging  Aspirin  Statin  On steroids-wean  AEDS  Replete lytes prn  GI/DVT PPX  Bowel Regimen  Pain control  OOB with PT  Monitor CT output  Close hemodynamic, ventilatory and drain monitoring and management per post op routine  Monitor for arrhythmias and monitor parameters for organ perfusion  Beta blockade not administered due to hemodynamic instability and bradycardia  Monitor neurologic status  Head of the bed should remain elevated to 45 deg   Chest PT and IS will be encouraged  Monitor adequacy of oxygenation and ventilation and attempt to wean oxygen  Antibiotic regimen will be tailored to the clinical, laboratory and microbiologic data  Nutritional goals will be met using po eventually, ensure adequate caloric intake and monitor the same  Stress ulcer and VTE prophylaxis will be achieved    Glycemic control is satisfactory  Electrolytes have been repleted as necessary and wound care has been carried out   Pain control has been achieved.   Aggressive physical therapy and early mobility and ambulation goals will be met   The family was updated about the course and plan.    CRITICAL CARE TIME personally provided by me  in evaluation and management, reassessments, review and interpretation of labs and x-rays, ventilator and hemodynamic management, formulating a plan and coordinating care: ___105____ MIN.  Time does not include procedural time.    CTICU ATTENDING     					  Alexx Brooks MD

## 2023-05-12 NOTE — PROGRESS NOTE ADULT - SUBJECTIVE AND OBJECTIVE BOX
INTERVAL COURSE   S/p TEVAR Day 7  Dx w/Pancreatitis on CT imaging post op   Seized around 7 pm, received 2 mg Ativan  Repeat labs remarkable for significant drop in H&H, no overt bleeding, abdomen mildly distended, NT  BPs soft with low UOP and rising Cr  Drowsy after ativan satting well, protecting airway     VITALS  Vital Signs Last 24 Hrs  T(C): 36.6 (12 May 2023 20:55), Max: 36.9 (12 May 2023 09:17)  T(F): 97.8 (12 May 2023 20:55), Max: 98.4 (12 May 2023 09:17)  HR: 98 (12 May 2023 21:00) (74 - 121)  BP: 111/74 (12 May 2023 21:00) (98/71 - 201/100)  BP(mean): 88 (12 May 2023 21:00) (80 - 144)  RR: 16 (12 May 2023 21:00) (16 - 19)  SpO2: 100% (12 May 2023 21:00) (89% - 100%)    PHYSICAL EXAM  General: Drowsy but arousable   Respiratory: diminished right sided BS   Cardiovascular: Regular rhythm/rate  Gastrointestinal: mildly distended and tympanic, NT  Extremities: Warm, anasarcic   Neurological: Nonfocal     MEDICATIONS  (STANDING):  albumin human  5% IVPB 250 milliLiter(s) IV Intermittent every 1 hour  aspirin enteric coated 81 milliGRAM(s) Oral daily  atorvastatin 20 milliGRAM(s) Oral at bedtime  chlorhexidine 2% Cloths 1 Application(s) Topical daily  dexMEDEtomidine Infusion 0.2 MICROgram(s)/kG/Hr (3.5 mL/Hr) IV Continuous <Continuous>  divalproex DR 1000 milliGRAM(s) Oral every 12 hours  lacosamide 100 milliGRAM(s) Oral every 12 hours  pantoprazole    Tablet 40 milliGRAM(s) Oral before breakfast  polyethylene glycol 3350 17 Gram(s) Oral daily  senna 2 Tablet(s) Oral at bedtime  sodium chloride 0.9%. 1000 milliLiter(s) (10 mL/Hr) IV Continuous <Continuous>    MEDICATIONS  (PRN):  acetaminophen     Tablet .. 650 milliGRAM(s) Oral every 6 hours PRN Mild Pain (1 - 3)  oxyCODONE    IR 5 milliGRAM(s) Oral every 6 hours PRN Moderate Pain (4 - 6)  oxyCODONE    IR 10 milliGRAM(s) Oral every 6 hours PRN Severe Pain (7 - 10)      LABS                        6.6    14.97 )-----------( 145      ( 12 May 2023 20:32 )             20.5     05-12    135  |  101  |  38<H>  ----------------------------<  193<H>  5.2   |  22  |  1.56<H>    Ca    8.5      12 May 2023 19:16  Phos  6.2     05-12  Mg     2.2     05-12    TPro  5.7<L>  /  Alb  2.9<L>  /  TBili  0.5  /  DBili  x   /  AST  15  /  ALT  <5<L>  /  AlkPhos  58  05-12    LIVER FUNCTIONS - ( 12 May 2023 19:16 )  Alb: 2.9 g/dL / Pro: 5.7 g/dL / ALK PHOS: 58 U/L / ALT: <5 U/L / AST: 15 U/L / GGT: x           PT/INR - ( 12 May 2023 19:16 )   PT: 17.0 sec;   INR: 1.42          PTT - ( 12 May 2023 19:16 )  PTT:31.1 sec    CARDIAC MARKERS ( 11 May 2023 16:31 )  x     / x     / 15 U/L / x     / x            IMAGING/EKG/ETC  Reviewed.

## 2023-05-13 LAB
ALBUMIN SERPL ELPH-MCNC: 2.6 G/DL — LOW (ref 3.3–5)
ALBUMIN SERPL ELPH-MCNC: 2.8 G/DL — LOW (ref 3.3–5)
ALBUMIN SERPL ELPH-MCNC: 2.9 G/DL — LOW (ref 3.3–5)
ALBUMIN SERPL ELPH-MCNC: 2.9 G/DL — LOW (ref 3.3–5)
ALP SERPL-CCNC: 53 U/L — SIGNIFICANT CHANGE UP (ref 40–120)
ALP SERPL-CCNC: 54 U/L — SIGNIFICANT CHANGE UP (ref 40–120)
ALP SERPL-CCNC: 58 U/L — SIGNIFICANT CHANGE UP (ref 40–120)
ALP SERPL-CCNC: 58 U/L — SIGNIFICANT CHANGE UP (ref 40–120)
ALT FLD-CCNC: 10 U/L — SIGNIFICANT CHANGE UP (ref 10–45)
ALT FLD-CCNC: 9 U/L — LOW (ref 10–45)
ALT FLD-CCNC: <5 U/L — LOW (ref 10–45)
ALT FLD-CCNC: <5 U/L — LOW (ref 10–45)
ANION GAP SERPL CALC-SCNC: 11 MMOL/L — SIGNIFICANT CHANGE UP (ref 5–17)
ANION GAP SERPL CALC-SCNC: 11 MMOL/L — SIGNIFICANT CHANGE UP (ref 5–17)
ANION GAP SERPL CALC-SCNC: 12 MMOL/L — SIGNIFICANT CHANGE UP (ref 5–17)
ANION GAP SERPL CALC-SCNC: 12 MMOL/L — SIGNIFICANT CHANGE UP (ref 5–17)
APTT BLD: 28.4 SEC — SIGNIFICANT CHANGE UP (ref 27.5–35.5)
APTT BLD: 29.1 SEC — SIGNIFICANT CHANGE UP (ref 27.5–35.5)
APTT BLD: 31.5 SEC — SIGNIFICANT CHANGE UP (ref 27.5–35.5)
APTT BLD: 33.7 SEC — SIGNIFICANT CHANGE UP (ref 27.5–35.5)
AST SERPL-CCNC: 14 U/L — SIGNIFICANT CHANGE UP (ref 10–40)
AST SERPL-CCNC: 16 U/L — SIGNIFICANT CHANGE UP (ref 10–40)
AST SERPL-CCNC: 18 U/L — SIGNIFICANT CHANGE UP (ref 10–40)
AST SERPL-CCNC: 18 U/L — SIGNIFICANT CHANGE UP (ref 10–40)
BASE EXCESS BLDA CALC-SCNC: -1 MMOL/L — SIGNIFICANT CHANGE UP (ref -2–3)
BILIRUB SERPL-MCNC: 0.5 MG/DL — SIGNIFICANT CHANGE UP (ref 0.2–1.2)
BILIRUB SERPL-MCNC: 0.6 MG/DL — SIGNIFICANT CHANGE UP (ref 0.2–1.2)
BUN SERPL-MCNC: 43 MG/DL — HIGH (ref 7–23)
BUN SERPL-MCNC: 44 MG/DL — HIGH (ref 7–23)
BUN SERPL-MCNC: 47 MG/DL — HIGH (ref 7–23)
BUN SERPL-MCNC: 48 MG/DL — HIGH (ref 7–23)
CALCIUM SERPL-MCNC: 8 MG/DL — LOW (ref 8.4–10.5)
CALCIUM SERPL-MCNC: 8.4 MG/DL — SIGNIFICANT CHANGE UP (ref 8.4–10.5)
CALCIUM SERPL-MCNC: 8.4 MG/DL — SIGNIFICANT CHANGE UP (ref 8.4–10.5)
CALCIUM SERPL-MCNC: 8.5 MG/DL — SIGNIFICANT CHANGE UP (ref 8.4–10.5)
CHLORIDE SERPL-SCNC: 101 MMOL/L — SIGNIFICANT CHANGE UP (ref 96–108)
CHLORIDE SERPL-SCNC: 101 MMOL/L — SIGNIFICANT CHANGE UP (ref 96–108)
CHLORIDE SERPL-SCNC: 102 MMOL/L — SIGNIFICANT CHANGE UP (ref 96–108)
CHLORIDE SERPL-SCNC: 103 MMOL/L — SIGNIFICANT CHANGE UP (ref 96–108)
CO2 BLDA-SCNC: 23 MMOL/L — SIGNIFICANT CHANGE UP (ref 19–24)
CO2 SERPL-SCNC: 20 MMOL/L — LOW (ref 22–31)
CO2 SERPL-SCNC: 22 MMOL/L — SIGNIFICANT CHANGE UP (ref 22–31)
CO2 SERPL-SCNC: 22 MMOL/L — SIGNIFICANT CHANGE UP (ref 22–31)
CO2 SERPL-SCNC: 23 MMOL/L — SIGNIFICANT CHANGE UP (ref 22–31)
CREAT SERPL-MCNC: 1.51 MG/DL — HIGH (ref 0.5–1.3)
CREAT SERPL-MCNC: 1.72 MG/DL — HIGH (ref 0.5–1.3)
CREAT SERPL-MCNC: 1.78 MG/DL — HIGH (ref 0.5–1.3)
CREAT SERPL-MCNC: 1.92 MG/DL — HIGH (ref 0.5–1.3)
EGFR: 41 ML/MIN/1.73M2 — LOW
EGFR: 45 ML/MIN/1.73M2 — LOW
EGFR: 47 ML/MIN/1.73M2 — LOW
EGFR: 55 ML/MIN/1.73M2 — LOW
GAS PNL BLDA: SIGNIFICANT CHANGE UP
GLUCOSE SERPL-MCNC: 105 MG/DL — HIGH (ref 70–99)
GLUCOSE SERPL-MCNC: 111 MG/DL — HIGH (ref 70–99)
GLUCOSE SERPL-MCNC: 117 MG/DL — HIGH (ref 70–99)
GLUCOSE SERPL-MCNC: 123 MG/DL — HIGH (ref 70–99)
HCO3 BLDA-SCNC: 22 MMOL/L — SIGNIFICANT CHANGE UP (ref 21–28)
HCT VFR BLD CALC: 24 % — LOW (ref 39–50)
HCT VFR BLD CALC: 25.3 % — LOW (ref 39–50)
HCT VFR BLD CALC: 25.5 % — LOW (ref 39–50)
HCT VFR BLD CALC: 26.1 % — LOW (ref 39–50)
HGB BLD-MCNC: 8.5 G/DL — LOW (ref 13–17)
HGB BLD-MCNC: 8.5 G/DL — LOW (ref 13–17)
HGB BLD-MCNC: 8.9 G/DL — LOW (ref 13–17)
HGB BLD-MCNC: 9.1 G/DL — LOW (ref 13–17)
INR BLD: 1.17 — HIGH (ref 0.88–1.16)
INR BLD: 1.25 — HIGH (ref 0.88–1.16)
INR BLD: 1.28 — HIGH (ref 0.88–1.16)
INR BLD: 1.34 — HIGH (ref 0.88–1.16)
ISTAT ARTERIAL BE: -1 MMOL/L — SIGNIFICANT CHANGE UP (ref -2–3)
ISTAT ARTERIAL GLUCOSE: 117 MG/DL — HIGH (ref 70–99)
ISTAT ARTERIAL HCO3: 24 MMOL/L — SIGNIFICANT CHANGE UP (ref 22–26)
ISTAT ARTERIAL HEMATOCRIT: 26 % — LOW (ref 39–50)
ISTAT ARTERIAL HEMOGLOBIN: 8.8 G/DL — LOW (ref 13–17)
ISTAT ARTERIAL IONIZED CALCIUM: 1.16 MMOL/L — SIGNIFICANT CHANGE UP (ref 1.12–1.3)
ISTAT ARTERIAL PCO2: 38 MMHG — SIGNIFICANT CHANGE UP (ref 35–45)
ISTAT ARTERIAL PH: 7.4 — SIGNIFICANT CHANGE UP (ref 7.35–7.45)
ISTAT ARTERIAL PO2: 305 MMHG — HIGH (ref 80–105)
ISTAT ARTERIAL POTASSIUM: 5.2 MMOL/L — SIGNIFICANT CHANGE UP (ref 3.5–5.3)
ISTAT ARTERIAL SO2: 100 % — HIGH (ref 95–98)
ISTAT ARTERIAL SODIUM: 135 MMOL/L — SIGNIFICANT CHANGE UP (ref 135–145)
ISTAT ARTERIAL TCO2: 25 MMOL/L — SIGNIFICANT CHANGE UP (ref 22–31)
LACTATE SERPL-SCNC: 0.9 MMOL/L — SIGNIFICANT CHANGE UP (ref 0.5–2)
LACTATE SERPL-SCNC: 1.6 MMOL/L — SIGNIFICANT CHANGE UP (ref 0.5–2)
MAGNESIUM SERPL-MCNC: 2.2 MG/DL — SIGNIFICANT CHANGE UP (ref 1.6–2.6)
MAGNESIUM SERPL-MCNC: 2.2 MG/DL — SIGNIFICANT CHANGE UP (ref 1.6–2.6)
MAGNESIUM SERPL-MCNC: 2.3 MG/DL — SIGNIFICANT CHANGE UP (ref 1.6–2.6)
MAGNESIUM SERPL-MCNC: 2.3 MG/DL — SIGNIFICANT CHANGE UP (ref 1.6–2.6)
MCHC RBC-ENTMCNC: 29.6 PG — SIGNIFICANT CHANGE UP (ref 27–34)
MCHC RBC-ENTMCNC: 30.1 PG — SIGNIFICANT CHANGE UP (ref 27–34)
MCHC RBC-ENTMCNC: 30.4 PG — SIGNIFICANT CHANGE UP (ref 27–34)
MCHC RBC-ENTMCNC: 30.5 PG — SIGNIFICANT CHANGE UP (ref 27–34)
MCHC RBC-ENTMCNC: 33.6 GM/DL — SIGNIFICANT CHANGE UP (ref 32–36)
MCHC RBC-ENTMCNC: 34.9 GM/DL — SIGNIFICANT CHANGE UP (ref 32–36)
MCHC RBC-ENTMCNC: 34.9 GM/DL — SIGNIFICANT CHANGE UP (ref 32–36)
MCHC RBC-ENTMCNC: 35.4 GM/DL — SIGNIFICANT CHANGE UP (ref 32–36)
MCV RBC AUTO: 85.7 FL — SIGNIFICANT CHANGE UP (ref 80–100)
MCV RBC AUTO: 86.4 FL — SIGNIFICANT CHANGE UP (ref 80–100)
MCV RBC AUTO: 87.3 FL — SIGNIFICANT CHANGE UP (ref 80–100)
MCV RBC AUTO: 88.2 FL — SIGNIFICANT CHANGE UP (ref 80–100)
NRBC # BLD: 0 /100 WBCS — SIGNIFICANT CHANGE UP (ref 0–0)
PCO2 BLDA: 32 MMHG — LOW (ref 35–48)
PH BLDA: 7.45 — SIGNIFICANT CHANGE UP (ref 7.35–7.45)
PHOSPHATE SERPL-MCNC: 5.5 MG/DL — HIGH (ref 2.5–4.5)
PHOSPHATE SERPL-MCNC: 5.8 MG/DL — HIGH (ref 2.5–4.5)
PHOSPHATE SERPL-MCNC: 5.9 MG/DL — HIGH (ref 2.5–4.5)
PHOSPHATE SERPL-MCNC: 6.1 MG/DL — HIGH (ref 2.5–4.5)
PLATELET # BLD AUTO: 102 K/UL — LOW (ref 150–400)
PLATELET # BLD AUTO: 121 K/UL — LOW (ref 150–400)
PLATELET # BLD AUTO: 125 K/UL — LOW (ref 150–400)
PLATELET # BLD AUTO: 136 K/UL — LOW (ref 150–400)
PO2 BLDA: 189 MMHG — HIGH (ref 83–108)
POTASSIUM SERPL-MCNC: 4.6 MMOL/L — SIGNIFICANT CHANGE UP (ref 3.5–5.3)
POTASSIUM SERPL-MCNC: 4.6 MMOL/L — SIGNIFICANT CHANGE UP (ref 3.5–5.3)
POTASSIUM SERPL-MCNC: 4.7 MMOL/L — SIGNIFICANT CHANGE UP (ref 3.5–5.3)
POTASSIUM SERPL-MCNC: 4.8 MMOL/L — SIGNIFICANT CHANGE UP (ref 3.5–5.3)
POTASSIUM SERPL-SCNC: 4.6 MMOL/L — SIGNIFICANT CHANGE UP (ref 3.5–5.3)
POTASSIUM SERPL-SCNC: 4.6 MMOL/L — SIGNIFICANT CHANGE UP (ref 3.5–5.3)
POTASSIUM SERPL-SCNC: 4.7 MMOL/L — SIGNIFICANT CHANGE UP (ref 3.5–5.3)
POTASSIUM SERPL-SCNC: 4.8 MMOL/L — SIGNIFICANT CHANGE UP (ref 3.5–5.3)
PROT SERPL-MCNC: 4.8 G/DL — LOW (ref 6–8.3)
PROT SERPL-MCNC: 5.4 G/DL — LOW (ref 6–8.3)
PROT SERPL-MCNC: 5.6 G/DL — LOW (ref 6–8.3)
PROT SERPL-MCNC: 5.6 G/DL — LOW (ref 6–8.3)
PROTHROM AB SERPL-ACNC: 14 SEC — HIGH (ref 10.5–13.4)
PROTHROM AB SERPL-ACNC: 14.9 SEC — HIGH (ref 10.5–13.4)
PROTHROM AB SERPL-ACNC: 15.3 SEC — HIGH (ref 10.5–13.4)
PROTHROM AB SERPL-ACNC: 16 SEC — HIGH (ref 10.5–13.4)
RBC # BLD: 2.8 M/UL — LOW (ref 4.2–5.8)
RBC # BLD: 2.87 M/UL — LOW (ref 4.2–5.8)
RBC # BLD: 2.92 M/UL — LOW (ref 4.2–5.8)
RBC # BLD: 3.02 M/UL — LOW (ref 4.2–5.8)
RBC # FLD: 14.5 % — SIGNIFICANT CHANGE UP (ref 10.3–14.5)
RBC # FLD: 14.7 % — HIGH (ref 10.3–14.5)
RBC # FLD: 14.9 % — HIGH (ref 10.3–14.5)
RBC # FLD: 15.4 % — HIGH (ref 10.3–14.5)
SAO2 % BLDA: 100 % — HIGH (ref 94–98)
SODIUM SERPL-SCNC: 134 MMOL/L — LOW (ref 135–145)
SODIUM SERPL-SCNC: 135 MMOL/L — SIGNIFICANT CHANGE UP (ref 135–145)
SODIUM SERPL-SCNC: 135 MMOL/L — SIGNIFICANT CHANGE UP (ref 135–145)
SODIUM SERPL-SCNC: 136 MMOL/L — SIGNIFICANT CHANGE UP (ref 135–145)
VALPROATE SERPL-MCNC: 89.4 UG/ML — SIGNIFICANT CHANGE UP (ref 50–100)
WBC # BLD: 12.19 K/UL — HIGH (ref 3.8–10.5)
WBC # BLD: 12.42 K/UL — HIGH (ref 3.8–10.5)
WBC # BLD: 13.37 K/UL — HIGH (ref 3.8–10.5)
WBC # BLD: 14.68 K/UL — HIGH (ref 3.8–10.5)
WBC # FLD AUTO: 12.19 K/UL — HIGH (ref 3.8–10.5)
WBC # FLD AUTO: 12.42 K/UL — HIGH (ref 3.8–10.5)
WBC # FLD AUTO: 13.37 K/UL — HIGH (ref 3.8–10.5)
WBC # FLD AUTO: 14.68 K/UL — HIGH (ref 3.8–10.5)

## 2023-05-13 RX ORDER — LACOSAMIDE 50 MG/1
100 TABLET ORAL EVERY 12 HOURS
Refills: 0 | Status: DISCONTINUED | OUTPATIENT
Start: 2023-05-13 | End: 2023-05-14

## 2023-05-13 RX ORDER — FENTANYL CITRATE 50 UG/ML
25 INJECTION INTRAVENOUS
Refills: 0 | Status: DISCONTINUED | OUTPATIENT
Start: 2023-05-13 | End: 2023-05-16

## 2023-05-13 RX ORDER — PANTOPRAZOLE SODIUM 20 MG/1
40 TABLET, DELAYED RELEASE ORAL DAILY
Refills: 0 | Status: DISCONTINUED | OUTPATIENT
Start: 2023-05-13 | End: 2023-05-27

## 2023-05-13 RX ORDER — FUROSEMIDE 40 MG
40 TABLET ORAL ONCE
Refills: 0 | Status: COMPLETED | OUTPATIENT
Start: 2023-05-13 | End: 2023-05-13

## 2023-05-13 RX ORDER — VALPROIC ACID (AS SODIUM SALT) 250 MG/5ML
1000 SOLUTION, ORAL ORAL EVERY 12 HOURS
Refills: 0 | Status: DISCONTINUED | OUTPATIENT
Start: 2023-05-13 | End: 2023-05-15

## 2023-05-13 RX ORDER — PHENYLEPHRINE HYDROCHLORIDE 10 MG/ML
0.1 INJECTION INTRAVENOUS
Qty: 40 | Refills: 0 | Status: DISCONTINUED | OUTPATIENT
Start: 2023-05-13 | End: 2023-05-14

## 2023-05-13 RX ORDER — ACETAMINOPHEN 500 MG
1000 TABLET ORAL ONCE
Refills: 0 | Status: COMPLETED | OUTPATIENT
Start: 2023-05-13 | End: 2023-05-15

## 2023-05-13 RX ORDER — FUROSEMIDE 40 MG
20 TABLET ORAL ONCE
Refills: 0 | Status: COMPLETED | OUTPATIENT
Start: 2023-05-13 | End: 2023-05-13

## 2023-05-13 RX ORDER — CHLORHEXIDINE GLUCONATE 213 G/1000ML
15 SOLUTION TOPICAL EVERY 12 HOURS
Refills: 0 | Status: DISCONTINUED | OUTPATIENT
Start: 2023-05-13 | End: 2023-05-26

## 2023-05-13 RX ORDER — BUMETANIDE 0.25 MG/ML
2 INJECTION INTRAMUSCULAR; INTRAVENOUS ONCE
Refills: 0 | Status: COMPLETED | OUTPATIENT
Start: 2023-05-13 | End: 2023-05-13

## 2023-05-13 RX ORDER — PROPOFOL 10 MG/ML
10 INJECTION, EMULSION INTRAVENOUS
Qty: 1000 | Refills: 0 | Status: DISCONTINUED | OUTPATIENT
Start: 2023-05-13 | End: 2023-05-16

## 2023-05-13 RX ADMIN — PANTOPRAZOLE SODIUM 40 MILLIGRAM(S): 20 TABLET, DELAYED RELEASE ORAL at 11:07

## 2023-05-13 RX ADMIN — CHLORHEXIDINE GLUCONATE 1 APPLICATION(S): 213 SOLUTION TOPICAL at 06:03

## 2023-05-13 RX ADMIN — CHLORHEXIDINE GLUCONATE 15 MILLILITER(S): 213 SOLUTION TOPICAL at 18:52

## 2023-05-13 RX ADMIN — PHENYLEPHRINE HYDROCHLORIDE 2.63 MICROGRAM(S)/KG/MIN: 10 INJECTION INTRAVENOUS at 20:45

## 2023-05-13 RX ADMIN — Medication 25 MILLIGRAM(S): at 09:14

## 2023-05-13 RX ADMIN — Medication 60 MILLIGRAM(S): at 17:05

## 2023-05-13 RX ADMIN — Medication 20 MILLIGRAM(S): at 11:07

## 2023-05-13 RX ADMIN — DEXMEDETOMIDINE HYDROCHLORIDE IN 0.9% SODIUM CHLORIDE 3.5 MICROGRAM(S)/KG/HR: 4 INJECTION INTRAVENOUS at 08:30

## 2023-05-13 RX ADMIN — LACOSAMIDE 120 MILLIGRAM(S): 50 TABLET ORAL at 18:41

## 2023-05-13 RX ADMIN — BUMETANIDE 2 MILLIGRAM(S): 0.25 INJECTION INTRAMUSCULAR; INTRAVENOUS at 23:00

## 2023-05-13 RX ADMIN — Medication 40 MILLIGRAM(S): at 15:04

## 2023-05-13 RX ADMIN — Medication 40 MILLIGRAM(S): at 21:08

## 2023-05-13 RX ADMIN — SODIUM CHLORIDE 10 MILLILITER(S): 9 INJECTION INTRAMUSCULAR; INTRAVENOUS; SUBCUTANEOUS at 08:44

## 2023-05-13 NOTE — CONSULT NOTE ADULT - ASSESSMENT
53-year-old male with PMHx of HTN, Type A dissection s/p Dacron grafts and AV resuspension (2013), CAD sp CABG x 1 ([facility], 5/2013, SVG-RCA), seizure disorder, daily marijuana use, and PSx of transverse aortic arch, second stage thoracic endovascular aortic repair, aorto-axillary bypass, AV replacement (bio 23mm), and CABG x 1 (SVG-RCA) EF 75% (Ignacio, 3/20). Presented to [place] ED 5/4 for epigastric pain. CT exam which showed known endoleak for which pt was tx to [facility]. Patient underwent TEVAR with Dr. Pierre 5/5, LD placed prior by NS. General surgery initially consulted on 5/8 with finding of diffuse pancreatic enlargement and heterogenous attenuation predominantly in body and tail, with multiple intrapancreatic and peripancreatic fluid collections with well-defined enhancing wall, new since November 30, 2022, not significantly changed since May 5, 2023 on CTA abdomen in the setting of uptrending leukocytosis. General Surgery reconsulted (05/13/2023) for abdominal hematoma observed via wet read after seizure activity prompted CT scan and acute hemoglobin drop from 9.4 to 6.6.    Patient is minimally responsive 2/2 Ativan administration in the setting of recent seizure activity; therefore, history gleaned from wife at bedside who reports abdominal pain worsening since yesterday.  In addition to acute anemia, serum labs are notable for hypkalemia (K 5.2), hyperphospatemia (6.2) likely 2/2 TREY (Bun 38, Cr 1.56) and leukocytosis (WBC 14.97). Overall picture indicates pancreatic enzyme damage to surrounding blood vessels due to pancreatitis resulting in intraabdominal bleeding and renal failure.      PLAN  - Continue resuscitation with blood products with transfer goal of Hgb above 8 2/2 cardiac history  - Consult IR for embolization intervention  - Surgery Team 4C will continue to follow closely  - Please page Team 4 at [phone] with any questions and/or clinical changes

## 2023-05-13 NOTE — PROGRESS NOTE ADULT - SUBJECTIVE AND OBJECTIVE BOX
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 Recent admission 3/20-4/14 during which patient underwent replacement of transverse aortic arch, second stage thoracic endovascular aortic repair, aorto-axillary bypass, AV replacement (bio 23mm), and CABG x 1 (SVG-RCA) EF 75% (Ignacio, 3/20). Post op cb lactic acidosis, severe cardiogenic and vasogenic shock, TREY requiring CVVHD and temporary dialysis requirement. Recent admission 4/27-4/30 for seizure episode, were his AEDS were adjusted. Presented to [place] ED 5/4 for epigastric pain. CT exam which showed known endoleak for which pt was tx to [facility]. Patient underwent TEVAR with Dr. Pierre 5/5, LD placed prior by NSGY. Arrived intubated on propofol. 5/6 extubated. 1 unit pRBC, CTH performed for decreased LE mvmt. Improved later in day. 5/7 LD clamped and patient ambulated, plan for dc. 5/8 US showing groin seroma and hematoma. CT scan showing necrotizing pancreatitis. Zosyn started and Gen surg consulted. Recommended IR and GI consults and further evaluation with imaging. Gi believes 2/2 insults from surgeries back in March and recommended outpatient fu. 5/9 new RIJ and PA catheter placed, R pigtail placed cb pneumothorax. 5/11 gen surg signed off. Repeat imaging showing concern for possible malignancy in pancreatic tail. Radiology attending called during day to say findings likely 2/2 pancreatitis and recommended outpatient contrast study. Around 7pm patient had questionable seizure-like episode. Given 2mg ativan. Labs sig for Hb drop to ~6. TREY with oliguria. Stat CT showing [id number] hematoma, mod-large ascites with layering in pelvis. Given 4 units pRBC and sent to IR for possible intervention. This AM returned form Ir intubated, no arterial bleeder identified therefore no intervention performed. Hb ~8, gen surg recommending additional transfusion.       FAMILY HISTORY:  No pertinent family history in first degree relatives  PAST MEDICAL & SURGICAL HISTORY:  HTN (hypertension)  Aortic dissection  CAD (coronary artery disease)  Seizure disorder  S/P aortic bifurcation bypass graft  S/P CABG x 1        Patient is a 53y old  Male who presents with a chief complaint of endoleak, abdominal pain.      14 system review was unremarkable    Vital signs, hemodynamic and respiratory parameters were reviewed from the bedside nursing flowsheet.  ICU Vital Signs Last 24 Hrs  T(C): 36.1 (13 May 2023 09:32), Max: 36.7 (12 May 2023 14:51)  T(F): 97 (13 May 2023 09:32), Max: 98 (12 May 2023 14:51)  HR: 101 (13 May 2023 10:00) (91 - 127)  BP: 121/82 (13 May 2023 06:17) (98/71 - 162/109)  BP(mean): 97 (13 May 2023 06:17) (80 - 131)  ABP: 123/84 (13 May 2023 10:00) (117/78 - 155/101)  ABP(mean): 101 (13 May 2023 10:00) (95 - 125)  RR: 16 (13 May 2023 10:00) (15 - 20)  SpO2: 100% (13 May 2023 10:00) (96% - 100%)    O2 Parameters below as of 13 May 2023 10:00  Patient On (Oxygen Delivery Method): ventilator,CMV    O2 Concentration (%): 50      Adult Advanced Hemodynamics Last 24 Hrs  CVP(mm Hg): 21 (13 May 2023 10:00) (-2 - 21)  CVP(cm H2O): --  CO: --  CI: --  PA: --  PA(mean): --  PCWP: --  SVR: --  SVRI: --  PVR: --  PVRI: --, ABG - ( 13 May 2023 08:41 )  pH, Arterial: 7.38  pH, Blood: x     /  pCO2: 37    /  pO2: 89    / HCO3: 22    / Base Excess: -2.8  /  SaO2: 98.3                Intake and output was reviewed and the fluid balance was calculated  Daily Height in cm: 182.88 (13 May 2023 06:17)    Daily   I&O's Summary    12 May 2023 07:01  -  13 May 2023 07:00  --------------------------------------------------------  IN: 2120 mL / OUT: 370 mL / NET: 1750 mL    13 May 2023 07:01  -  13 May 2023 10:06  --------------------------------------------------------  IN: 320 mL / OUT: 130 mL / NET: 190 mL        All lines and drain sites were assessed  Glycemic trend was reviewedCAPILLARY BLOOD GLUCOSE      POCT Blood Glucose.: 170 mg/dL (12 May 2023 18:58)      Neuro: sedated  HEENT: ett  Heart: s1 s2  Lungs: clear bl  Abdomen: soft nt nd  Extremities: wwp, groin seroma x 2    Lines:  R axillary arterial 5/13  RIJ 5/9    Tubes:  R pigtail 5/9    labs  CBC Full  -  ( 13 May 2023 08:33 )  WBC Count : 13.37 K/uL  RBC Count : 2.92 M/uL  Hemoglobin : 8.9 g/dL  Hematocrit : 25.5 %  Platelet Count - Automated : 102 K/uL  Mean Cell Volume : 87.3 fl  Mean Cell Hemoglobin : 30.5 pg  Mean Cell Hemoglobin Concentration : 34.9 gm/dL  Auto Neutrophil # : x  Auto Lymphocyte # : x  Auto Monocyte # : x  Auto Eosinophil # : x  Auto Basophil # : x  Auto Neutrophil % : x  Auto Lymphocyte % : x  Auto Monocyte % : x  Auto Eosinophil % : x  Auto Basophil % : x    05-13    134<L>  |  103  |  44<H>  ----------------------------<  111<H>  4.8   |  20<L>  |  1.72<H>    Ca    8.0<L>      13 May 2023 08:33  Phos  6.1     05-13  Mg     2.2     05-13    TPro  4.8<L>  /  Alb  2.6<L>  /  TBili  0.5  /  DBili  x   /  AST  16  /  ALT  <5<L>  /  AlkPhos  54  05-13    PT/INR - ( 13 May 2023 08:33 )   PT: 15.3 sec;   INR: 1.28          PTT - ( 13 May 2023 08:33 )  PTT:33.7 sec  The current medications were reviewed   MEDICATIONS  (STANDING):  aspirin enteric coated 81 milliGRAM(s) Oral daily  atorvastatin 20 milliGRAM(s) Oral at bedtime  chlorhexidine 2% Cloths 1 Application(s) Topical daily  dexMEDEtomidine Infusion 0.2 MICROgram(s)/kG/Hr (3.5 mL/Hr) IV Continuous <Continuous>  hydrocortisone sodium succinate Injectable 25 milliGRAM(s) IV Push every 12 hours  lacosamide IVPB 100 milliGRAM(s) IV Intermittent every 12 hours  pantoprazole  Injectable 40 milliGRAM(s) IV Push daily  polyethylene glycol 3350 17 Gram(s) Oral daily  senna 2 Tablet(s) Oral at bedtime  sodium chloride 0.9%. 1000 milliLiter(s) (10 mL/Hr) IV Continuous <Continuous>  valproate sodium  IVPB 1000 milliGRAM(s) IV Intermittent every 12 hours    MEDICATIONS  (PRN):  acetaminophen     Tablet .. 650 milliGRAM(s) Oral every 6 hours PRN Mild Pain (1 - 3)  oxyCODONE    IR 5 milliGRAM(s) Oral every 6 hours PRN Moderate Pain (4 - 6)  oxyCODONE    IR 10 milliGRAM(s) Oral every 6 hours PRN Severe Pain (7 - 10)      Assessment/Plan:  53yr old male with PMH HTN, type A dissection sp Dacron grafts and AV resuspension (2013), CAD sp CABG x 1 ([facility], 5/2013, SVG-RCA), seizures, admitted for surgical mgmt of progression of aneurysmal disease. Recent admission 3/20-4/14 during which patient underwent replacement of transverse aortic arch, second stage thoracic endovascular aortic repair, aorto-axillary bypass, AV replacement (bio 23mm), and CABG x 1 (SVG-RCA) EF 75% ([place], 3/20). Post op cb lactic acidosis, severe cardiogenic and vasogenic shock, TREY requiring CVVHD and temporary dialysis requirement. Recent admission 4/27-4/30 for seizure episode, were his AEDS were adjusted. Presented to [place] ED 5/4 for epigastric pain. CT exam which showed known endoleak for which pt was tx to [facility]. Patient underwent TEVAR with Dr. Pierre 5/5, LD placed prior by GEO. Arrived intubated on propofol. Given 2 units intraop, 1L IVF, 100cc urine output.    POD8 TEVAR (Ignacio, 5/5)  Necrotizing pancreatitis cb large hematoma  Sp IR attempt at embolization, no arterial bleed identified  Hemorrhagic shock-sp 4 units pRBC; give additional 2 pRBC, 2 FFP, 1 plt, 10cryo  Anti-emetics prn  Acute respiratory failure requiring mechanical ventilation-keep for now  Thrombocytopenia-monitor  TREY-monitor urine output-likely ischemic insult from hemorrhage  Aspirin  Statin  On steroids-wean  AEDS  Appreciate neuro recs  vEEG to follow  Replete lytes prn  GI/DVT PPX  Bowel Regimen  Pain control  Monitor CT output  NPO  Close hemodynamic, ventilatory and drain monitoring and management per post op routine  Monitor for arrhythmias and monitor parameters for organ perfusion  Beta blockade not administered due to hemodynamic instability and bradycardia  Monitor neurologic status  Head of the bed should remain elevated to 45 deg   Chest PT and IS will be encouraged  Monitor adequacy of oxygenation and ventilation and attempt to wean oxygen  Antibiotic regimen will be tailored to the clinical, laboratory and microbiologic data  Nutritional goals will be met using po eventually, ensure adequate caloric intake and monitor the same  Stress ulcer and VTE prophylaxis will be achieved    Glycemic control is satisfactory  Electrolytes have been repleted as necessary and wound care has been carried out   Pain control has been achieved.   Aggressive physical therapy and early mobility and ambulation goals will be met   The family was updated about the course and plan.    CRITICAL CARE TIME personally provided by me  in evaluation and management, reassessments, review and interpretation of labs and x-rays, ventilator and hemodynamic management, formulating a plan and coordinating care: ___105____ MIN.  Time does not include procedural time.    CTICU ATTENDING     					  Alexx Brooks MD

## 2023-05-13 NOTE — CONSULT NOTE ADULT - SUBJECTIVE AND OBJECTIVE BOX
HPI: 53-year-old male with PMHx of HTN, Type A dissection s/p Dacron grafts and AV resuspension (2013), CAD sp CABG x 1 ([facility], 5/2013, SVG-RCA), seizure disorder, daily marijuana use, and PSx of transverse aortic arch, second stage thoracic endovascular aortic repair, aorto-axillary bypass, AV replacement (bio 23mm), and CABG x 1 (SVG-RCA) EF 75% (Ignacio, 3/20). Presented to [place] ED 5/4 for epigastric pain. CT exam which showed known endoleak for which pt was tx to [facility]. Patient underwent TEVAR with Dr. Pierre 5/5, LD placed prior by NSYAMILET. General surgery initially consulted on 5/8 with finding of diffuse pancreatic enlargement and heterogenous attenuation predominantly in body and tail, with multiple intrapancreatic and peripancreatic fluid collections with well-defined enhancing wall, new since November 30, 2022, not significantly changed since May 5, 2023 on CTA abdomen in the setting of uptrending leukocytosis. General Surgery reconsulted (05/13/2023) for abdominal hematoma observed via wet read after seizure activity prompted CT scan and acute hemoglobin drop from 9.4 to 6.6.      PAST MEDICAL & SURGICAL HISTORY:  - Aortic Dissection  - Coronary Artery Disease  - Hypertension  - Seizure disorder    PAST MEDICAL & SURGICAL HISTORY:  - Aortic Bifurcation Bypass Graft  - Coronary Artery Bypass Graft x1      MEDICATIONS  (STANDING):  albumin human  5% IVPB 250 milliLiter(s) IV Intermittent every 1 hour  aspirin enteric coated 81 milliGRAM(s) Oral daily  atorvastatin 20 milliGRAM(s) Oral at bedtime  chlorhexidine 2% Cloths 1 Application(s) Topical daily  dexMEDEtomidine Infusion 0.2 MICROgram(s)/kG/Hr (3.5 mL/Hr) IV Continuous <Continuous>  divalproex DR 1000 milliGRAM(s) Oral every 12 hours  lacosamide 100 milliGRAM(s) Oral every 12 hours  pantoprazole    Tablet 40 milliGRAM(s) Oral before breakfast  polyethylene glycol 3350 17 Gram(s) Oral daily  senna 2 Tablet(s) Oral at bedtime  sodium chloride 0.9%. 1000 milliLiter(s) (10 mL/Hr) IV Continuous <Continuous>    MEDICATIONS  (PRN):  acetaminophen     Tablet .. 650 milliGRAM(s) Oral every 6 hours PRN Mild Pain (1 - 3)  oxyCODONE    IR 5 milliGRAM(s) Oral every 6 hours PRN Moderate Pain (4 - 6)  oxyCODONE    IR 10 milliGRAM(s) Oral every 6 hours PRN Severe Pain (7 - 10)    Allergies:  No Known Allergies    Intolerances:  None    FAMILY HISTORY:  - Father:  COPD, DM      SOCIAL HISTORY:  Patient resides with his wife and their common child and was employed as a .  He quit smoking cigarettes over 10 years ago after smoking for 1-2 years; however, he continues to smoke marijuana regularly.  Patient does not consume EtOH.      T(C): 36.6 (05-12-23 @ 20:55), Max: 36.9 (05-12-23 @ 09:17)  HR: 101 (05-12-23 @ 22:55) (74 - 121)  BP: 121/82 (05-12-23 @ 22:00) (98/71 - 201/100)  RR: 16 (05-12-23 @ 22:55) (16 - 18)  SpO2: 100% (05-12-23 @ 22:55) (89% - 100%)    GENERAL: NAD, Resting comfortably in bed, lethargic, does opens eyes   HEENT: NCAT, MMM, Normal conjunctiva  RESP: Nonlabored breathing on nonrebreather mask, No respiratory distress  CARD: Tachycardic (), Widened pulse pressure (, /93), Normal peripheral perfusion  GI: Soft, mildly distended, tender RLQ with deep palpation, Hyperactive bowel sounds, No guarding, No rebound tenderness  :  Cantrell catheter in place with milagro colored urine; R groin hematoma  EXTREM: WWP, No edema, No gross deformity of extremities  SKIN: No rashes, no lesions, multiple well-healed surgical wounds across the upper chest and sternum  NEURO: Extremely lethargic w/ difficulty arousing s/p adminstration of Ativan for seizure activity, No focal motor or sensory deficits  PSYCH: Affect and characteristics of appearance, verbalizations, and behaviors are appropriate    LABS:                        6.6    14.97 )-----------( 145      ( 12 May 2023 20:32 )             20.5     05-12    135  |  101  |  38<H>  ----------------------------<  193<H>  5.2   |  22  |  1.56<H>    Ca    8.5      12 May 2023 19:16  Phos  6.2     05-12  Mg     2.2     05-12    TPro  5.7<L>  /  Alb  2.9<L>  /  TBili  0.5  /  DBili  x   /  AST  15  /  ALT  <5<L>  /  AlkPhos  58  05-12    PT/INR - ( 12 May 2023 19:16 )   PT: 17.0 sec;   INR: 1.42          PTT - ( 12 May 2023 19:16 )  PTT:31.1 sec  LIVER FUNCTIONS - ( 12 May 2023 19:16 )  Alb: 2.9 g/dL / Pro: 5.7 g/dL / ALK PHOS: 58 U/L / ALT: <5 U/L / AST: 15 U/L / GGT: x             RADIOLOGY & ADDITIONAL STUDIES:  CT Abdomen and Pelvis No Cont:   ACC: [id number] EXAM:  CT ABDOMEN AND PELVIS   ORDERED BY: CECILLE PEÑA     PROCEDURE DATE:  05/11/2023          INTERPRETATION:  CLINICAL INFORMATION: Pigtail clamped. Abdominal pain.   Rule out pancreatic cyst.    COMPARISON: Abdominal ultrasound May 9, 2023. CT abdomen pelvis May 2023.    CONTRAST/COMPLICATIONS:  IV Contrast: None  Oral Contrast: None  Complications: None    PROCEDURE:  CT of the Chest, Abdomen and Pelvis was performed.  Sagittal and coronal reformats were performed.    FINDINGS:  CHEST:  LUNGS AND LARGE AIRWAYS: Patent central airways. Unchanged left lower   lobe partial atelectasis/consolidation  PLEURA: Decreased small residual layering right pleural effusion with   chest tube in right major fissure. Stable moderate left pleural effusion   with fluid and major fissure. No pneumothorax.  VESSELS: Redemonstrated aortic valve replacement and operative changes   endograft repair of type A aortic dissection. Endovascular stent remains   in place mid descending thoracic aortic segment to supraceliac level.   Stable caliber dilated thoracic aortic segments: aortic isthmus 5 cm, mid   descending segment 6 cm, suprarenal level 4.2 cm, infrarenal aorta 3.2   cm. Otherwise limited vascular evaluation without intravenous contrast.  HEART: Cardiomegaly. No pericardial effusion.  MEDIASTINUM AND MARJAN: No lymphadenopathy.  CHEST WALL AND LOWER NECK: Right IJ approach central venous catheter in   place with tip in right atrium.    ABDOMEN AND PELVIS:  LIVER: Within normal limits.  BILE DUCTS: Normal caliber.  GALLBLADDER: Within normal limits.  SPLEEN: Within normal limits.  PANCREAS: Unchanged diffuse enlargement pancreatic body/tail, with   heterogenous attenuation. Stable or possibly decreased perigastric fluid   collections, accurate assessment is limited without intravenous contrast.  ADRENALS: Within normal limits.  KIDNEYS/URETERS: No hydronephrosis.    BLADDER: Cantrell catheter.  REPRODUCTIVE ORGANS: Prostate within normal limits.    BOWEL: No bowel obstruction.  PERITONEUM: No ascites.  VESSELS: Stable moderate pelvic and small scattered upper abdominal   ascites..  RETROPERITONEUM/LYMPH NODES: No lymphadenopathy.  ABDOMINAL WALL: Postsurgical changes. Stable simple appearing fluid   pocket 4 cm in rightgroin subcutaneous tissues, seroma or old hematoma.   Anasarca.  BONES: Median sternotomy. Degenerative changes.    IMPRESSION:  Since May 8, 2023, decreased small residual right pleural effusion status   chest tube placement, pigtail coiled in majorfissure.  Unchanged moderate left pleural effusion with unchanged adjacent   atelectasis/consolidation.  Limited pancreas evaluation. Unchanged marked fullness pancreatic   body/tail with heterogenous attenuation. Stable or slightly decreased   extrapancreatic/perigastric fluid collections on this suboptimal   noncontrast study. Abbreviated differential includes pseudocyst, walled   off necrosis, abscess. Recommend follow-up contrast-enhanced study to   resolution.  Unchanged small moderate abdominopelvic ascites.  Unchanged caliber aneurysmal thoracic and abdominal aorta status post   endovascular repair.    --- End of Report ---    JULI VICKERS MD; Attending Radiologist  This document has been electronically signed. May 12 2023 10:12AM (05-11-23 @ 23:05)

## 2023-05-13 NOTE — PROGRESS NOTE ADULT - SUBJECTIVE AND OBJECTIVE BOX
INTERVAL COURSE  IR angiogram for intraabdominal hematoma, no evidence of pseudoaneurysm or active bleed  Intubated for the procedure  Received intubated and HDS- received additional 2PC/ FFP/PLT and cryo   Labs remarkable for suboptimal response to blood transfusion and worsening renal fx    no more seizure activities on low dose prop, vEEG in place     VITALS  Vital Signs Last 24 Hrs  T(C): 36.9 (13 May 2023 20:52), Max: 36.9 (13 May 2023 20:52)  T(F): 98.4 (13 May 2023 20:52), Max: 98.4 (13 May 2023 20:52)  HR: 111 (14 May 2023 00:00) (84 - 127)  BP: 121/82 (13 May 2023 06:17) (121/82 - 121/82)  BP(mean): 97 (13 May 2023 06:17) (97 - 97)  RR: 18 (14 May 2023 00:00) (12 - 20)  SpO2: 100% (14 May 2023 00:00) (96% - 100%)  Parameters below as of 14 May 2023 01:00  Patient On (Oxygen Delivery Method): ventilator  O2 Concentration (%): 50    I&O's Summary  13 May 2023 07:01  -  14 May 2023 00:47  --------------------------------------------------------  IN: 2484.3 mL / OUT: 1050 mL / NET: 1434.3 mL    PHYSICAL EXAM  General: intubated and sedated   Respiratory: CTAB/L anteriorly   Cardiovascular: Sinus tach   Gastrointestinal: Distended with hypoactive BS   Extremities: Warm, anasarcic     IMAGING/EKG/ETC  Reviewed.

## 2023-05-13 NOTE — CHART NOTE - NSCHARTNOTEFT_GEN_A_CORE
SERIAL ABDOMINAL EXAM    SUBJECTIVE  Patient seen and examined at bedside. Intubated and sedated. On Tiot at 0.7. MAP~100    T(C): 36.9 (05-13-23 @ 20:52), Max: 36.9 (05-13-23 @ 20:52)  HR: 104 (05-13-23 @ 22:00) (84 - 127)  BP: 121/82 (05-13-23 @ 06:17) (121/82 - 121/82)  RR: 12 (05-13-23 @ 22:00) (12 - 20)  SpO2: 100% (05-13-23 @ 22:00) (96% - 100%)    OBJECTIVE    PHYSICAL EXAM  General: Lying in bed, intubated/sedated  Pulm: Nonlabored breathing, no respiratory distress  CV: Tachycardic - sinus  Abd: soft, mild distention. No rebound, no guarding  : coulter in place  Extremities: warm, well perfused    Hb noted to be stable at last CBC 8.9 (9.1). Total products: 6 uPRBC, 2 cryo, 2 FFP, 1 platelets  Phenylephrine started to increase MAP given worsening kidney function (most recent Cr 1.92)  Additional 1 u PRBC being given now - next CBC at 1 AM  Received 40 lasix x 2 for low UOP  Will continue to folllow closely

## 2023-05-13 NOTE — PROGRESS NOTE ADULT - SUBJECTIVE AND OBJECTIVE BOX
Neurology Progress Note    Interval History:  Patient was sedated and intubated.       Medications:  acetaminophen     Tablet .. 650 milliGRAM(s) Oral every 6 hours PRN  aspirin enteric coated 81 milliGRAM(s) Oral daily  atorvastatin 20 milliGRAM(s) Oral at bedtime  chlorhexidine 2% Cloths 1 Application(s) Topical daily  dexMEDEtomidine Infusion 0.2 MICROgram(s)/kG/Hr IV Continuous <Continuous>  lacosamide IVPB 100 milliGRAM(s) IV Intermittent every 12 hours  oxyCODONE    IR 10 milliGRAM(s) Oral every 6 hours PRN  oxyCODONE    IR 5 milliGRAM(s) Oral every 6 hours PRN  pantoprazole  Injectable 40 milliGRAM(s) IV Push daily  polyethylene glycol 3350 17 Gram(s) Oral daily  senna 2 Tablet(s) Oral at bedtime  sodium chloride 0.9%. 1000 milliLiter(s) IV Continuous <Continuous>  valproate sodium  IVPB 1000 milliGRAM(s) IV Intermittent every 12 hours      Vital Signs Last 24 Hrs  T(C): 35.9 (13 May 2023 14:55), Max: 36.7 (13 May 2023 01:01)  T(F): 96.6 (13 May 2023 14:55), Max: 98 (13 May 2023 01:01)  HR: 86 (13 May 2023 16:00) (84 - 127)  BP: 121/82 (13 May 2023 06:17) (98/71 - 121/82)  BP(mean): 97 (13 May 2023 06:17) (80 - 98)  RR: 16 (13 May 2023 16:00) (15 - 20)  SpO2: 100% (13 May 2023 16:00) (96% - 100%)    Parameters below as of 13 May 2023 16:00  Patient On (Oxygen Delivery Method): ventilator    O2 Concentration (%): 50    Neurological Examination:  General:  Appearance is consistent with chronologic age. grimace on painful stimulus  Gross skin survey within normal limits.    Cognitive/Language:  patient sedated   Cranial Nerves  - Eyes: intubated and sedated   - Face:  grimace upon painful stimulus   - Ears/Nose/Throat:  unable to access  Motor examination:  unable to access  Sensory examination:   unable to access  Cerebellum:   unable to access     Labs:  CBC Full  -  ( 13 May 2023 14:28 )  WBC Count : 12.19 K/uL  RBC Count : 3.02 M/uL  Hemoglobin : 9.1 g/dL  Hematocrit : 26.1 %  Platelet Count - Automated : 121 K/uL  Mean Cell Volume : 86.4 fl  Mean Cell Hemoglobin : 30.1 pg  Mean Cell Hemoglobin Concentration : 34.9 gm/dL  Auto Neutrophil # : x  Auto Lymphocyte # : x  Auto Monocyte # : x  Auto Eosinophil # : x  Auto Basophil # : x  Auto Neutrophil % : x  Auto Lymphocyte % : x  Auto Monocyte % : x  Auto Eosinophil % : x  Auto Basophil % : x    05-13    135  |  101  |  47<H>  ----------------------------<  117<H>  4.6   |  22  |  1.78<H>    Ca    8.4      13 May 2023 14:28  Phos  5.8     05-13  Mg     2.3     05-13    TPro  5.6<L>  /  Alb  2.9<L>  /  TBili  0.6  /  DBili  x   /  AST  18  /  ALT  10  /  AlkPhos  58  05-13    LIVER FUNCTIONS - ( 13 May 2023 14:28 )  Alb: 2.9 g/dL / Pro: 5.6 g/dL / ALK PHOS: 58 U/L / ALT: 10 U/L / AST: 18 U/L / GGT: x           PT/INR - ( 13 May 2023 14:28 )   PT: 14.0 sec;   INR: 1.17          PTT - ( 13 May 2023 14:28 )  PTT:29.1 sec

## 2023-05-13 NOTE — PROGRESS NOTE ADULT - ASSESSMENT
54yo Male pt with PMH HTN, type A aortic dissection s/p Dacron grafts, AV resuspension in 2013, CAD s/p CABG x 1 SVG to RCA (5/2013 with Dr. Medina), seizure disorder (last episode on 7/4/22) S/P replacement of transverse aortic arch, second stage thoracic endovascular aortic repair, aorto-axillary bypass, AV replacement (bio 23mm), in APr 2023 and CABG x 1 (SVG-RCA) EF 75% ([place], 3/20) now s/p 2nd state TEVAR on 5/5 for whom surgery was consulted for finding of acute pancreatitis with large peripancreatic/perigastric fluid collections on CTA abdomen on 5/8. Epilepsy team consulted after patient presenting with an episode of altered mental status, described by PA of facial twitching and blinking for 5 minutos that resolved after placed on Ativan 2 mg IV. After that patient has been lethargic progressively improving but still unresponsive however the patient is on precedex. No new seizure described by primary team.     Impression: Breakthrough provoked seizure in postop (severe anemia, electrolytes derangements, TREY over CKD, recent surgery) s/p Ativan IV in patient with PHMx of epilepsy well controlled. Another option is syncope 2/2 TME (severe anemia, electrolytes derangements, TREY over CKD, recent surgery). Rule out subclinic seizures/non-convulsive status    PLAN:  ABC and stabilize vitals per primary team.   Continue with AED lacosamide 100 mg BID and Divalproex ER 1000 mg BID  continue vEEG  Utox and routine labs including ammonia, TSH, Mg and Phos, lactate.  Serum AED level, valproic acid and Vimpat.    Seizure & Fall precautions  Ativan 2mg IVP for seizures > 2mins  Keep Mg>2 and K >4.   Management per primary team.     Please call us if any questions or neurological changes.     Case discussed with epilepsy attending Dr. Alves

## 2023-05-13 NOTE — PROGRESS NOTE ADULT - ASSESSMENT
52 M w/ PMHx of HTN, type A aortic dissection s/p Dacron grafts, AV resuspension in 2013, CAD s/p CABG x 1 SVG to RCA (5/2013 with Dr. Medina), seizure disorder who was admitted for surgical mgmt of progression of aneurysmal disease. Recent admission 3/20-4/14 during which patient underwent replacement of transverse aortic arch, second stage TEVAR and aorto-axillary bypass, AV replacement (bio 23mm), and CABG x 1 (SVG-RCA) EF 75% ([place]acio, 3/20). Post op cb severe cardiogenic and vasogenic shock, TREY requiring CVVHD and temporary dialysis. Camr off RRT and discharged home mid April.presented [facility] ED 4/[facility] for syncope vs seizure episode at home. negative neuro work up for Seizures AED dose adjusted however imaging at that rime revealed recent graft endoleak, Admitted now for surgical mgmt.   S/p Second stage thoracic endovascular aortic repair  5/5 transient LE weakness, Improved   LD clamped and Dc's 5/8  worsening leukocytosis- CT C/A/P suggestive of acute pancreatitis with 2 fluid collections / Lipase on admission > 1000 down to 95 5/8   A/p  - 5/12  Seizure? vEEG in place-epilepsy recs appreciated   - Dropping H&H with CT finding LUQ hematoma/Taken to IR for Angiogram early morning/no acute arterial bleeding or pseudoaneurysm noted, brought back to ICU- intubated.  Suboptimal response to blood transfusion--> received additional PCs and blood products though with worsening of kidney fx most likely a combination of hypovolemia and contrast injury/ given acute pancreatitis and hemorrhagic pic high risk for abdominal compartment syn, bladder pressure checked ~ 9  Started on Tito for higher MAPs -optimizing renal perfusion--> forced diuresed with better response  Will continue serial CBCs/ Gensurg closely following   Holding feeds and sq Hep    ATTENDING: I have personally and independently provided 30 Min of critical care services.  52 M w/ PMHx of HTN, type A aortic dissection s/p Dacron grafts, AV resuspension in 2013, CAD s/p CABG x 1 SVG to RCA (5/2013 with Dr. Medina), seizure disorder who was admitted for surgical mgmt of progression of aneurysmal disease. Recent admission 3/20-4/14 during which patient underwent replacement of transverse aortic arch, second stage TEVAR and aorto-axillary bypass, AV replacement (bio 23mm), and CABG x 1 (SVG-RCA) EF 75% (Ignacio, 3/20). Post op cb severe cardiogenic and vasogenic shock, TREY requiring CVVHD and temporary dialysis. Camr off RRT and discharged home mid April.presented to [facility] ED 4/27 for syncope vs seizure episode at home. negative neuro work up for Seizures AED dose adjusted however imaging at that rime revealed recent graft endoleak, Admitted now for surgical mgmt.   S/p Second stage thoracic endovascular aortic repair  5/5 transient LE weakness, Improved   LD clamped and Dc's 5/8  worsening leukocytosis- CT C/A/P suggestive of acute pancreatitis with 2 fluid collections / Lipase on admission > 1000 down to 95 5/8   A/p  - 5/12  Seizure? vEEG in place-epilepsy recs appreciated/on low dose propofol- Vimpat and Depakote unchanged   - Dropping H&H with CT finding LUQ hematoma/Taken to IR for Angiogram early morning/no acute arterial bleeding or pseudoaneurysm noted, brought back to ICU- intubated.  Suboptimal response to blood transfusion--> received additional PCs and blood products though with worsening of kidney fx most likely a combination of hypovolemia and contrast injury/ given acute pancreatitis and hemorrhagic pic high risk for abdominal compartment syn, bladder pressure checked ~ 9  Started on Tito for higher MAPs -optimizing renal perfusion--> forced diuresed with better response  Will continue serial CBCs/ Gensurg closely following   Holding feeds and sq Hep    ATTENDING: I have personally and independently provided 30 Min of critical care services.

## 2023-05-13 NOTE — CHART NOTE - NSCHARTNOTEFT_GEN_A_CORE
Chief surgical resident note:    General surgery reconsulted for concerns of intra-abdominal bleed in the setting of acute change in clinical status. Briefly, this is a 53M admitted to CTS service for completion TEVAR for endoleak now POD 7 for whom GS was consulted for incidental findings of acute pancreatitis with associated peripancreatic fluid collections. Patient was improving with regards to pancreatitis, however, began experiencing acute onset abdominal this evening prompting repeat CT scan CAP with findings of large intraabdominal hematoma at pancreatic tail concerning for active hemorrhage. Clinically, patient has become tachycardic with lower than baseline BP, has experienced decreased UO, as well as seizure like activity. Labs have shown down-trending hgb to 6.6 from 10, INR 1.4, creatine 1.56 from normal yesterday. Currently patient is receiving his second unit of blood with recommendations for continued resuscitation. Case discussed with Dr. Vazquez--GS recommends immediate IR consultation for evaluation for angiography and possible embolization of likely ongoing hemorrhage. Will continue to follow closely and will be available for continued consultation.

## 2023-05-14 LAB
ALBUMIN SERPL ELPH-MCNC: 2.5 G/DL — LOW (ref 3.3–5)
ALBUMIN SERPL ELPH-MCNC: 2.7 G/DL — LOW (ref 3.3–5)
ALBUMIN SERPL ELPH-MCNC: 2.9 G/DL — LOW (ref 3.3–5)
ALBUMIN SERPL ELPH-MCNC: 3 G/DL — LOW (ref 3.3–5)
ALP SERPL-CCNC: 60 U/L — SIGNIFICANT CHANGE UP (ref 40–120)
ALP SERPL-CCNC: 61 U/L — SIGNIFICANT CHANGE UP (ref 40–120)
ALP SERPL-CCNC: 64 U/L — SIGNIFICANT CHANGE UP (ref 40–120)
ALP SERPL-CCNC: 69 U/L — SIGNIFICANT CHANGE UP (ref 40–120)
ALT FLD-CCNC: 6 U/L — LOW (ref 10–45)
ALT FLD-CCNC: 7 U/L — LOW (ref 10–45)
ALT FLD-CCNC: 8 U/L — LOW (ref 10–45)
ALT FLD-CCNC: 8 U/L — LOW (ref 10–45)
AMYLASE P1 CFR SERPL: 101 U/L — SIGNIFICANT CHANGE UP (ref 25–125)
ANION GAP SERPL CALC-SCNC: 12 MMOL/L — SIGNIFICANT CHANGE UP (ref 5–17)
ANION GAP SERPL CALC-SCNC: 12 MMOL/L — SIGNIFICANT CHANGE UP (ref 5–17)
ANION GAP SERPL CALC-SCNC: 14 MMOL/L — SIGNIFICANT CHANGE UP (ref 5–17)
ANION GAP SERPL CALC-SCNC: 14 MMOL/L — SIGNIFICANT CHANGE UP (ref 5–17)
ANION GAP SERPL CALC-SCNC: 15 MMOL/L — SIGNIFICANT CHANGE UP (ref 5–17)
APTT BLD: 28.6 SEC — SIGNIFICANT CHANGE UP (ref 27.5–35.5)
APTT BLD: 28.6 SEC — SIGNIFICANT CHANGE UP (ref 27.5–35.5)
APTT BLD: 29.7 SEC — SIGNIFICANT CHANGE UP (ref 27.5–35.5)
APTT BLD: 30.7 SEC — SIGNIFICANT CHANGE UP (ref 27.5–35.5)
APTT BLD: 34 SEC — SIGNIFICANT CHANGE UP (ref 27.5–35.5)
AST SERPL-CCNC: 14 U/L — SIGNIFICANT CHANGE UP (ref 10–40)
AST SERPL-CCNC: 15 U/L — SIGNIFICANT CHANGE UP (ref 10–40)
AST SERPL-CCNC: 18 U/L — SIGNIFICANT CHANGE UP (ref 10–40)
AST SERPL-CCNC: 18 U/L — SIGNIFICANT CHANGE UP (ref 10–40)
BILIRUB SERPL-MCNC: 0.6 MG/DL — SIGNIFICANT CHANGE UP (ref 0.2–1.2)
BILIRUB SERPL-MCNC: 0.8 MG/DL — SIGNIFICANT CHANGE UP (ref 0.2–1.2)
BLD GP AB SCN SERPL QL: NEGATIVE — SIGNIFICANT CHANGE UP
BUN SERPL-MCNC: 48 MG/DL — HIGH (ref 7–23)
BUN SERPL-MCNC: 48 MG/DL — HIGH (ref 7–23)
BUN SERPL-MCNC: 53 MG/DL — HIGH (ref 7–23)
BUN SERPL-MCNC: 54 MG/DL — HIGH (ref 7–23)
BUN SERPL-MCNC: 56 MG/DL — HIGH (ref 7–23)
CALCIUM SERPL-MCNC: 8.2 MG/DL — LOW (ref 8.4–10.5)
CALCIUM SERPL-MCNC: 8.3 MG/DL — LOW (ref 8.4–10.5)
CALCIUM SERPL-MCNC: 8.4 MG/DL — SIGNIFICANT CHANGE UP (ref 8.4–10.5)
CALCIUM SERPL-MCNC: 8.5 MG/DL — SIGNIFICANT CHANGE UP (ref 8.4–10.5)
CALCIUM SERPL-MCNC: 8.7 MG/DL — SIGNIFICANT CHANGE UP (ref 8.4–10.5)
CHLORIDE SERPL-SCNC: 101 MMOL/L — SIGNIFICANT CHANGE UP (ref 96–108)
CHLORIDE SERPL-SCNC: 101 MMOL/L — SIGNIFICANT CHANGE UP (ref 96–108)
CHLORIDE SERPL-SCNC: 102 MMOL/L — SIGNIFICANT CHANGE UP (ref 96–108)
CHLORIDE SERPL-SCNC: 104 MMOL/L — SIGNIFICANT CHANGE UP (ref 96–108)
CHLORIDE SERPL-SCNC: 104 MMOL/L — SIGNIFICANT CHANGE UP (ref 96–108)
CK SERPL-CCNC: 23 U/L — LOW (ref 30–200)
CK SERPL-CCNC: 31 U/L — SIGNIFICANT CHANGE UP (ref 30–200)
CO2 SERPL-SCNC: 21 MMOL/L — LOW (ref 22–31)
CO2 SERPL-SCNC: 22 MMOL/L — SIGNIFICANT CHANGE UP (ref 22–31)
CO2 SERPL-SCNC: 22 MMOL/L — SIGNIFICANT CHANGE UP (ref 22–31)
CREAT SERPL-MCNC: 1.97 MG/DL — HIGH (ref 0.5–1.3)
CREAT SERPL-MCNC: 2.08 MG/DL — HIGH (ref 0.5–1.3)
CREAT SERPL-MCNC: 2.08 MG/DL — HIGH (ref 0.5–1.3)
CREAT SERPL-MCNC: 2.09 MG/DL — HIGH (ref 0.5–1.3)
CREAT SERPL-MCNC: 2.13 MG/DL — HIGH (ref 0.5–1.3)
EGFR: 36 ML/MIN/1.73M2 — LOW
EGFR: 37 ML/MIN/1.73M2 — LOW
EGFR: 40 ML/MIN/1.73M2 — LOW
GAS PNL BLDA: SIGNIFICANT CHANGE UP
GLUCOSE SERPL-MCNC: 100 MG/DL — HIGH (ref 70–99)
GLUCOSE SERPL-MCNC: 101 MG/DL — HIGH (ref 70–99)
GLUCOSE SERPL-MCNC: 104 MG/DL — HIGH (ref 70–99)
GLUCOSE SERPL-MCNC: 106 MG/DL — HIGH (ref 70–99)
GLUCOSE SERPL-MCNC: 107 MG/DL — HIGH (ref 70–99)
HCT VFR BLD CALC: 23.8 % — LOW (ref 39–50)
HCT VFR BLD CALC: 26 % — LOW (ref 39–50)
HCT VFR BLD CALC: 27.4 % — LOW (ref 39–50)
HCT VFR BLD CALC: 28.6 % — LOW (ref 39–50)
HCT VFR BLD CALC: 30.9 % — LOW (ref 39–50)
HGB BLD-MCNC: 10 G/DL — LOW (ref 13–17)
HGB BLD-MCNC: 10.8 G/DL — LOW (ref 13–17)
HGB BLD-MCNC: 8.3 G/DL — LOW (ref 13–17)
HGB BLD-MCNC: 9.4 G/DL — LOW (ref 13–17)
HGB BLD-MCNC: 9.7 G/DL — LOW (ref 13–17)
INR BLD: 1.22 — HIGH (ref 0.88–1.16)
INR BLD: 1.25 — HIGH (ref 0.88–1.16)
INR BLD: 1.31 — HIGH (ref 0.88–1.16)
INR BLD: 1.31 — HIGH (ref 0.88–1.16)
INR BLD: 1.32 — HIGH (ref 0.88–1.16)
LACTATE SERPL-SCNC: 1 MMOL/L — SIGNIFICANT CHANGE UP (ref 0.5–2)
LACTATE SERPL-SCNC: 1.1 MMOL/L — SIGNIFICANT CHANGE UP (ref 0.5–2)
LIDOCAIN IGE QN: 82 U/L — HIGH (ref 7–60)
MAGNESIUM SERPL-MCNC: 2.1 MG/DL — SIGNIFICANT CHANGE UP (ref 1.6–2.6)
MAGNESIUM SERPL-MCNC: 2.2 MG/DL — SIGNIFICANT CHANGE UP (ref 1.6–2.6)
MAGNESIUM SERPL-MCNC: 2.3 MG/DL — SIGNIFICANT CHANGE UP (ref 1.6–2.6)
MCHC RBC-ENTMCNC: 30.1 PG — SIGNIFICANT CHANGE UP (ref 27–34)
MCHC RBC-ENTMCNC: 30.1 PG — SIGNIFICANT CHANGE UP (ref 27–34)
MCHC RBC-ENTMCNC: 30.4 PG — SIGNIFICANT CHANGE UP (ref 27–34)
MCHC RBC-ENTMCNC: 30.5 PG — SIGNIFICANT CHANGE UP (ref 27–34)
MCHC RBC-ENTMCNC: 30.8 PG — SIGNIFICANT CHANGE UP (ref 27–34)
MCHC RBC-ENTMCNC: 34.9 GM/DL — SIGNIFICANT CHANGE UP (ref 32–36)
MCHC RBC-ENTMCNC: 35 GM/DL — SIGNIFICANT CHANGE UP (ref 32–36)
MCHC RBC-ENTMCNC: 35 GM/DL — SIGNIFICANT CHANGE UP (ref 32–36)
MCHC RBC-ENTMCNC: 35.4 GM/DL — SIGNIFICANT CHANGE UP (ref 32–36)
MCHC RBC-ENTMCNC: 36.2 GM/DL — HIGH (ref 32–36)
MCV RBC AUTO: 85.2 FL — SIGNIFICANT CHANGE UP (ref 80–100)
MCV RBC AUTO: 86.1 FL — SIGNIFICANT CHANGE UP (ref 80–100)
MCV RBC AUTO: 86.1 FL — SIGNIFICANT CHANGE UP (ref 80–100)
MCV RBC AUTO: 86.2 FL — SIGNIFICANT CHANGE UP (ref 80–100)
MCV RBC AUTO: 87.2 FL — SIGNIFICANT CHANGE UP (ref 80–100)
NRBC # BLD: 0 /100 WBCS — SIGNIFICANT CHANGE UP (ref 0–0)
PHOSPHATE SERPL-MCNC: 5.4 MG/DL — HIGH (ref 2.5–4.5)
PHOSPHATE SERPL-MCNC: 5.6 MG/DL — HIGH (ref 2.5–4.5)
PHOSPHATE SERPL-MCNC: 5.7 MG/DL — HIGH (ref 2.5–4.5)
PHOSPHATE SERPL-MCNC: 5.9 MG/DL — HIGH (ref 2.5–4.5)
PHOSPHATE SERPL-MCNC: 6 MG/DL — HIGH (ref 2.5–4.5)
PLATELET # BLD AUTO: 106 K/UL — LOW (ref 150–400)
PLATELET # BLD AUTO: 123 K/UL — LOW (ref 150–400)
PLATELET # BLD AUTO: 128 K/UL — LOW (ref 150–400)
PLATELET # BLD AUTO: 136 K/UL — LOW (ref 150–400)
PLATELET # BLD AUTO: 143 K/UL — LOW (ref 150–400)
POTASSIUM SERPL-MCNC: 3.7 MMOL/L — SIGNIFICANT CHANGE UP (ref 3.5–5.3)
POTASSIUM SERPL-MCNC: 4.1 MMOL/L — SIGNIFICANT CHANGE UP (ref 3.5–5.3)
POTASSIUM SERPL-MCNC: 4.2 MMOL/L — SIGNIFICANT CHANGE UP (ref 3.5–5.3)
POTASSIUM SERPL-MCNC: 4.5 MMOL/L — SIGNIFICANT CHANGE UP (ref 3.5–5.3)
POTASSIUM SERPL-MCNC: 4.6 MMOL/L — SIGNIFICANT CHANGE UP (ref 3.5–5.3)
POTASSIUM SERPL-SCNC: 3.7 MMOL/L — SIGNIFICANT CHANGE UP (ref 3.5–5.3)
POTASSIUM SERPL-SCNC: 4.1 MMOL/L — SIGNIFICANT CHANGE UP (ref 3.5–5.3)
POTASSIUM SERPL-SCNC: 4.2 MMOL/L — SIGNIFICANT CHANGE UP (ref 3.5–5.3)
POTASSIUM SERPL-SCNC: 4.5 MMOL/L — SIGNIFICANT CHANGE UP (ref 3.5–5.3)
POTASSIUM SERPL-SCNC: 4.6 MMOL/L — SIGNIFICANT CHANGE UP (ref 3.5–5.3)
PROT SERPL-MCNC: 5.3 G/DL — LOW (ref 6–8.3)
PROT SERPL-MCNC: 5.4 G/DL — LOW (ref 6–8.3)
PROT SERPL-MCNC: 5.7 G/DL — LOW (ref 6–8.3)
PROT SERPL-MCNC: 5.9 G/DL — LOW (ref 6–8.3)
PROTHROM AB SERPL-ACNC: 14.5 SEC — HIGH (ref 10.5–13.4)
PROTHROM AB SERPL-ACNC: 14.9 SEC — HIGH (ref 10.5–13.4)
PROTHROM AB SERPL-ACNC: 15.6 SEC — HIGH (ref 10.5–13.4)
PROTHROM AB SERPL-ACNC: 15.6 SEC — HIGH (ref 10.5–13.4)
PROTHROM AB SERPL-ACNC: 15.8 SEC — HIGH (ref 10.5–13.4)
RBC # BLD: 2.73 M/UL — LOW (ref 4.2–5.8)
RBC # BLD: 3.05 M/UL — LOW (ref 4.2–5.8)
RBC # BLD: 3.18 M/UL — LOW (ref 4.2–5.8)
RBC # BLD: 3.32 M/UL — LOW (ref 4.2–5.8)
RBC # BLD: 3.59 M/UL — LOW (ref 4.2–5.8)
RBC # FLD: 14.8 % — HIGH (ref 10.3–14.5)
RBC # FLD: 15 % — HIGH (ref 10.3–14.5)
RBC # FLD: 15.4 % — HIGH (ref 10.3–14.5)
RBC # FLD: 15.4 % — HIGH (ref 10.3–14.5)
RBC # FLD: 15.5 % — HIGH (ref 10.3–14.5)
RH IG SCN BLD-IMP: POSITIVE — SIGNIFICANT CHANGE UP
SODIUM SERPL-SCNC: 135 MMOL/L — SIGNIFICANT CHANGE UP (ref 135–145)
SODIUM SERPL-SCNC: 136 MMOL/L — SIGNIFICANT CHANGE UP (ref 135–145)
SODIUM SERPL-SCNC: 136 MMOL/L — SIGNIFICANT CHANGE UP (ref 135–145)
SODIUM SERPL-SCNC: 139 MMOL/L — SIGNIFICANT CHANGE UP (ref 135–145)
SODIUM SERPL-SCNC: 140 MMOL/L — SIGNIFICANT CHANGE UP (ref 135–145)
WBC # BLD: 12.43 K/UL — HIGH (ref 3.8–10.5)
WBC # BLD: 13.34 K/UL — HIGH (ref 3.8–10.5)
WBC # BLD: 5.24 K/UL — SIGNIFICANT CHANGE UP (ref 3.8–10.5)
WBC # BLD: 7.11 K/UL — SIGNIFICANT CHANGE UP (ref 3.8–10.5)
WBC # BLD: 7.61 K/UL — SIGNIFICANT CHANGE UP (ref 3.8–10.5)
WBC # FLD AUTO: 12.43 K/UL — HIGH (ref 3.8–10.5)
WBC # FLD AUTO: 13.34 K/UL — HIGH (ref 3.8–10.5)
WBC # FLD AUTO: 5.24 K/UL — SIGNIFICANT CHANGE UP (ref 3.8–10.5)
WBC # FLD AUTO: 7.11 K/UL — SIGNIFICANT CHANGE UP (ref 3.8–10.5)
WBC # FLD AUTO: 7.61 K/UL — SIGNIFICANT CHANGE UP (ref 3.8–10.5)

## 2023-05-14 RX ORDER — ALBUMIN HUMAN 25 %
50 VIAL (ML) INTRAVENOUS ONCE
Refills: 0 | Status: COMPLETED | OUTPATIENT
Start: 2023-05-14 | End: 2023-05-14

## 2023-05-14 RX ORDER — LACOSAMIDE 50 MG/1
100 TABLET ORAL
Refills: 0 | Status: DISCONTINUED | OUTPATIENT
Start: 2023-05-14 | End: 2023-05-15

## 2023-05-14 RX ORDER — PHENYLEPHRINE HYDROCHLORIDE 10 MG/ML
0.1 INJECTION INTRAVENOUS
Qty: 40 | Refills: 0 | Status: DISCONTINUED | OUTPATIENT
Start: 2023-05-14 | End: 2023-05-15

## 2023-05-14 RX ORDER — VASOPRESSIN 20 [USP'U]/ML
0.01 INJECTION INTRAVENOUS
Qty: 40 | Refills: 0 | Status: DISCONTINUED | OUTPATIENT
Start: 2023-05-14 | End: 2023-05-14

## 2023-05-14 RX ORDER — BUMETANIDE 0.25 MG/ML
2 INJECTION INTRAMUSCULAR; INTRAVENOUS ONCE
Refills: 0 | Status: COMPLETED | OUTPATIENT
Start: 2023-05-14 | End: 2023-05-14

## 2023-05-14 RX ORDER — PHENYLEPHRINE HYDROCHLORIDE 10 MG/ML
0.1 INJECTION INTRAVENOUS
Qty: 40 | Refills: 0 | Status: DISCONTINUED | OUTPATIENT
Start: 2023-05-14 | End: 2023-05-14

## 2023-05-14 RX ADMIN — PANTOPRAZOLE SODIUM 40 MILLIGRAM(S): 20 TABLET, DELAYED RELEASE ORAL at 11:16

## 2023-05-14 RX ADMIN — CHLORHEXIDINE GLUCONATE 15 MILLILITER(S): 213 SOLUTION TOPICAL at 18:23

## 2023-05-14 RX ADMIN — Medication 60 MILLIGRAM(S): at 05:23

## 2023-05-14 RX ADMIN — LACOSAMIDE 120 MILLIGRAM(S): 50 TABLET ORAL at 21:47

## 2023-05-14 RX ADMIN — CHLORHEXIDINE GLUCONATE 15 MILLILITER(S): 213 SOLUTION TOPICAL at 05:23

## 2023-05-14 RX ADMIN — PROPOFOL 4.2 MICROGRAM(S)/KG/MIN: 10 INJECTION, EMULSION INTRAVENOUS at 13:26

## 2023-05-14 RX ADMIN — FENTANYL CITRATE 25 MICROGRAM(S): 50 INJECTION INTRAVENOUS at 04:00

## 2023-05-14 RX ADMIN — LACOSAMIDE 120 MILLIGRAM(S): 50 TABLET ORAL at 06:05

## 2023-05-14 RX ADMIN — Medication 50 MILLILITER(S): at 11:35

## 2023-05-14 RX ADMIN — DEXMEDETOMIDINE HYDROCHLORIDE IN 0.9% SODIUM CHLORIDE 3.5 MICROGRAM(S)/KG/HR: 4 INJECTION INTRAVENOUS at 09:01

## 2023-05-14 RX ADMIN — BUMETANIDE 2 MILLIGRAM(S): 0.25 INJECTION INTRAMUSCULAR; INTRAVENOUS at 03:01

## 2023-05-14 RX ADMIN — VASOPRESSIN 1.5 UNIT(S)/MIN: 20 INJECTION INTRAVENOUS at 08:40

## 2023-05-14 RX ADMIN — PROPOFOL 4.2 MICROGRAM(S)/KG/MIN: 10 INJECTION, EMULSION INTRAVENOUS at 03:25

## 2023-05-14 RX ADMIN — Medication 60 MILLIGRAM(S): at 17:06

## 2023-05-14 RX ADMIN — LACOSAMIDE 120 MILLIGRAM(S): 50 TABLET ORAL at 18:22

## 2023-05-14 RX ADMIN — CHLORHEXIDINE GLUCONATE 1 APPLICATION(S): 213 SOLUTION TOPICAL at 05:42

## 2023-05-14 RX ADMIN — FENTANYL CITRATE 25 MICROGRAM(S): 50 INJECTION INTRAVENOUS at 03:39

## 2023-05-14 NOTE — CHART NOTE - NSCHARTNOTEFT_GEN_A_CORE
It was reported that the patient had an episode of possible focal seizure around 4:30 PM as R facial twitching lasted about 4 minutes, resolved after propofol 5mcg.   I evaluated him at the bedside he is sedated however there are still subtle R eye twitching. vEEG showed L posterior central possible epileptiform discharges.     Recommendations:   Increase the Lacosamide to 100mg TID.   Continue with VA 1000mg BID.   C/w vEEG.   Follow up with Lacosamide serum level.   VA serum level is 89.4 therapeutic level.   Seizure and fall precaution.    Case discussed with epilepsy attending.   Thank you for the courtesy of this consult, Please contact us if any neurological changes or questions, neurology team will continue to follow.  Andrea Amezcua M.D  PGY3 Neurology Resident It was reported that the patient had an episode of possible focal seizure around 4:30 PM as R facial twitching lasted about 4 minutes, resolved after propofol 5mcg.     I evaluated him at the bedside he is sedated however there are still subtle R eye twitching. vEEG showed L posterior central possible epileptiform discharges.       Exam:   Not responsive  subtle Right eye twitching        Recommendations:   Increase the Lacosamide to 100mg TID.   Continue with VA 1000mg BID.   C/w vEEG.   Follow up with Lacosamide serum level.   VA serum level is 89.4 therapeutic level.   Seizure and fall precaution.    Case discussed with epilepsy attending.   Thank you for the courtesy of this consult, Please contact us if any neurological changes or questions, neurology team will continue to follow.  Andrea Amezcua M.D  PGY3 Neurology Resident        I saw this patient and along with Dr. Amezcua R2 decided on the treatment plan as above.

## 2023-05-14 NOTE — PROGRESS NOTE ADULT - SUBJECTIVE AND OBJECTIVE BOX
CTICU  CRITICAL  CARE  attending     Hand off received 					   Pertinent clinical, laboratory, radiographic, hemodynamic, echocardiographic, respiratory data, microbiologic data and chart were reviewed and analyzed frequently throughout the course of the day and night        52 year old Male with HTN, type A aortic dissection s/p Dacron grafts, AV resuspension in 2013, CAD s/p CABG x 1 SVG to RCA (5/2013 with Dr. Medina), seizure disorder (last episode on 7/4/22)   He is a current every day marijuana user.   He was admitted for surgical management of progression of aneurysmal disease.   On 3/20/23 the patient underwent replacement of transverse aortic arch, second stage thoracic endovascular aortic repair, aorto-axillary bypass, AV replacement (bio 23mm),CABG x 1 (SVG-RCA).   Postoperative course complicated by lactic acidosis, severe cardiogenic and vasogenic shock, TREY requiring CVVHD and temporary dialysis requirement. Eventually discharged 4/14/23.   The patient presented to [facility] Emergency Room 4/27 for syncope vs seizure episode at home, falling backwards on the head.   Neurological workup performed during that visit revealed a negative EEG, but given clinical picture of seizures, he was started on vimpat and depakote.   Imaging preformed during that admission did no necessitate acute surgical intervention on endoleak.   He was readmitted to [facility] complaining of worsening epigastric pain.   CTAP revealed continued endoleak.   He was transferred to [facility] for further evaluation.    S/P Second stage TEVAR.       FAMILY HISTORY:  No pertinent family history in first degree relatives    PAST MEDICAL & SURGICAL HISTORY:  HTN (hypertension)  Aortic dissection  CAD (coronary artery disease)  Seizure disorder  S/P aortic bifurcation bypass graft  H/O CABG x 1      14 system review was unremarkable    Vital signs, hemodynamic and respiratory parameters were reviewed from the bedside nursing flow sheet.  ICU Vital Signs Last 24 Hrs  T(C): 37.1 (14 May 2023 13:47), Max: 37.1 (14 May 2023 08:28)  T(F): 98.7 (14 May 2023 13:47), Max: 98.8 (14 May 2023 08:28)  HR: 108 (14 May 2023 21:00) (100 - 136)  BP: 102/74 (14 May 2023 07:00) (102/74 - 102/74)  BP(mean): 84 (14 May 2023 07:00) (84 - 84)  ABP: 103/62 (14 May 2023 21:00) (94/58 - 139/90)  ABP(mean): 79 (14 May 2023 21:00) (72 - 111)  RR: 24 (14 May 2023 21:00) (12 - 26)  SpO2: 97% (14 May 2023 21:00) (93% - 100%)    O2 Parameters below as of 14 May 2023 22:00  Patient On (Oxygen Delivery Method): ventilator    O2 Concentration (%): 60      Adult Advanced Hemodynamics Last 24 Hrs  CVP(mm Hg): 7 (14 May 2023 21:00) (2 - 76)  CVP(cm H2O): --  CO: --  CI: --  PA: --  PA(mean): --  PCWP: --  SVR: --  SVRI: --  PVR: --  PVRI: --, ABG - ( 14 May 2023 14:24 )  pH, Arterial: 7.46  pH, Blood: x     /  pCO2: 32    /  pO2: 61    / HCO3: 23    / Base Excess: -0.3  /  SaO2: 94.4              Mode: AC/ CMV (Assist Control/ Continuous Mandatory Ventilation)  RR (machine): 12  TV (machine): 500  FiO2: 60  PEEP: 7  ITime: 1  MAP: 13  PIP: 23    Intake and output was reviewed and the fluid balance was calculated  Daily     Daily   I&O's Summary    13 May 2023 07:01  -  14 May 2023 07:00  --------------------------------------------------------  IN: 3097.7 mL / OUT: 1430 mL / NET: 1667.7 mL    14 May 2023 07:01  -  14 May 2023 21:52  --------------------------------------------------------  IN: 2033.9 mL / OUT: 610 mL / NET: 1423.9 mL        All lines and drain sites were assessed    Neuro: No focal motor deficit.  Neck: No JVD.  CVS: S1, S2, No S3.  Lungs: Good air entry bilaterally.   Abd: Soft. Mild tenderness. + Bowel sounds.  Vascular: + DP/PT.  Extremities: No edema.  Lymphatic: Normal.  Skin: No abnormalities.      labs  CBC Full  -  ( 14 May 2023 14:29 )  WBC Count : 7.11 K/uL  RBC Count : 3.32 M/uL  Hemoglobin : 10.0 g/dL  Hematocrit : 28.6 %  Platelet Count - Automated : 106 K/uL  Mean Cell Volume : 86.1 fl  Mean Cell Hemoglobin : 30.1 pg  Mean Cell Hemoglobin Concentration : 35.0 gm/dL  Auto Neutrophil # : x  Auto Lymphocyte # : x  Auto Monocyte # : x  Auto Eosinophil # : x  Auto Basophil # : x  Auto Neutrophil % : x  Auto Lymphocyte % : x  Auto Monocyte % : x  Auto Eosinophil % : x  Auto Basophil % : x    05-14    140  |  104  |  48<H>  ----------------------------<  100<H>  4.2   |  21<L>  |  2.08<H>    Ca    8.2<L>      14 May 2023 14:29  Phos  5.6     05-14  Mg     2.3     05-14    TPro  5.3<L>  /  Alb  2.7<L>  /  TBili  0.8  /  DBili  x   /  AST  15  /  ALT  6<L>  /  AlkPhos  61  05-14    PT/INR - ( 14 May 2023 14:29 )   PT: 15.6 sec;   INR: 1.31          PTT - ( 14 May 2023 14:29 )  PTT:30.7 sec  The current medications were reviewed   MEDICATIONS  (STANDING):  aspirin enteric coated 81 milliGRAM(s) Oral daily  atorvastatin 20 milliGRAM(s) Oral at bedtime  chlorhexidine 0.12% Liquid 15 milliLiter(s) Oral Mucosa every 12 hours  chlorhexidine 2% Cloths 1 Application(s) Topical daily  dexMEDEtomidine Infusion 0.2 MICROgram(s)/kG/Hr (3.5 mL/Hr) IV Continuous <Continuous>  lacosamide IVPB 100 milliGRAM(s) IV Intermittent <User Schedule>  pantoprazole  Injectable 40 milliGRAM(s) IV Push daily  phenylephrine    Infusion 0.1 MICROgram(s)/kG/Min (2.63 mL/Hr) IV Continuous <Continuous>  polyethylene glycol 3350 17 Gram(s) Oral daily  propofol Infusion 10 MICROgram(s)/kG/Min (4.2 mL/Hr) IV Continuous <Continuous>  senna 2 Tablet(s) Oral at bedtime  sodium chloride 0.9%. 1000 milliLiter(s) (10 mL/Hr) IV Continuous <Continuous>  valproate sodium  IVPB 1000 milliGRAM(s) IV Intermittent every 12 hours    MEDICATIONS  (PRN):  acetaminophen     Tablet .. 650 milliGRAM(s) Oral every 6 hours PRN Mild Pain (1 - 3)  acetaminophen   IVPB .. 1000 milliGRAM(s) IV Intermittent once PRN Mild Pain (1 - 3)  fentaNYL    Injectable 25 MICROGram(s) IV Push every 3 hours PRN for breakthrough pain        53 year old  Male admitted with abdominal pain due to endoleak.  S/P Second Stage TEVAR.  Postoperative course complicated by Hemorrhagic Pancreatitis S/P Embolization., Renal Failure, Ileus, Epilepsy, Ascites, Respiratory Failure, Acute posthemorrhagic anemia.  Metabolic acidosis.  Hemodynamically stable.  Good oxygenation.  Fair urine out put.        My plan includes :  Continue Vent support.  IV phenylephrine.  Antiseizure Rx.  Statin Rx.  TPN  Close hemodynamic monitoring   Monitor for arrhythmias and monitor parameters for organ perfusion  Monitor neurologic status  Monitor renal function.  Head of the bed should remain elevated to 45 deg .   Chest PT and IS will be encouraged  Monitor adequacy of oxygenation   Nutritional goals will be met using po eventually , ensure adequate caloric intake and monitor the same  Stress ulcer and VTE prophylaxis will be achieved    Glycemic control is satisfactory  Electrolytes have been repleted as necessary and wound care has been carried out. Pain control has been achieved.   Aggressive physical therapy and early mobility and ambulation goals will be met   The family was updated about the course and plan  CRITICAL CARE TIME SPENT in evaluation and management, review and interpretation of labs and x-rays, hemodynamic management, formulating a plan and coordinating care: ___30____ MIN.  Time does not include procedural time.  CTICU ATTENDING     					    José Miguel Torres MD

## 2023-05-14 NOTE — PROGRESS NOTE ADULT - SUBJECTIVE AND OBJECTIVE BOX
SUBJECTIVE: Pt seen and examined by chief resident this AM on rounds. Intubated and sedated    Vital Signs Last 24 Hrs  T(C): 37.1 (14 May 2023 08:28), Max: 37.1 (14 May 2023 08:28)  T(F): 98.8 (14 May 2023 08:28), Max: 98.8 (14 May 2023 08:28)  HR: 103 (14 May 2023 13:00) (84 - 136)  BP: 102/74 (14 May 2023 07:00) (102/74 - 102/74)  BP(mean): 84 (14 May 2023 07:00) (84 - 84)  RR: 21 (14 May 2023 13:00) (12 - 23)  SpO2: 97% (14 May 2023 13:00) (95% - 100%)    Parameters below as of 14 May 2023 14:00  Patient On (Oxygen Delivery Method): ventilator    O2 Concentration (%): 60    I&O's Summary    13 May 2023 07:01  -  14 May 2023 07:00  --------------------------------------------------------  IN: 3097.7 mL / OUT: 1430 mL / NET: 1667.7 mL    14 May 2023 07:01  -  14 May 2023 13:29  --------------------------------------------------------  IN: 792.9 mL / OUT: 230 mL / NET: 562.9 mL        Physical Exam:  General Appearance: sedated  Pulmonary: intubated  Cardiovascular: sinus tach  Abdomen: firm, distended, dependent edema on the left side  Extremities: WWP, SCD's in place     LABS:                        9.4    7.61  )-----------( 123      ( 14 May 2023 09:51 )             26.0     05-14    136  |  101  |  56<H>  ----------------------------<  101<H>  4.1   |  21<L>  |  2.09<H>    Ca    8.3<L>      14 May 2023 09:51  Phos  5.7     05-14  Mg     2.1     05-14    TPro  5.4<L>  /  Alb  2.5<L>  /  TBili  0.8  /  DBili  x   /  AST  14  /  ALT  7<L>  /  AlkPhos  64  05-14    PT/INR - ( 14 May 2023 09:51 )   PT: 15.8 sec;   INR: 1.32          PTT - ( 14 May 2023 09:51 )  PTT:29.7 sec

## 2023-05-14 NOTE — PROGRESS NOTE ADULT - ASSESSMENT
Pt seen and evaluated multiple times overnight and in agreement with the above findings. 53M with PMHx HTN, type A aortic dissection s/p Dacron grafts, AV resuspension in 2013, CAD s/p CABG x 1 SVG to RCA (5/2013 with Dr. Medina), seizure disorder (last episode on 7/4/22) S/P replacement of transverse aortic arch, second stage thoracic endovascular aortic repair, now s/p 2nd state TEVAR on 5/5 with course complicated by acute pancreatis and acute blood loss anemia. Surgery consulted on 5/8 for pancreatitis that was improving. GS reconsulted on 5/12 for acute onset of severe abdominal pain with repeat imaging concerning for active hemorrhage from pancreatic tail. Angiogram with IR (5/13) negative for PSA or active bleed.    Pt seen by attending earlier today, abdominal exam improved from this AM, soft . UO 45cc/hr.   10am Hgb less of a drop than the day before  Please repeat CBC @ 2pm  Continue resuscitation with blood products with transfer goal of Hgb above 8 2/2 cardiac history  Surgery Team 4C will continue to follow closely  Please page Team 4 at [phone] with any questions and/or clinical changes

## 2023-05-14 NOTE — EEG REPORT - NS EEG TEXT BOX
[facility] Department of Neurology  Inpatient Continuous video-Electroencephalogram      Patient Name:	VINCENT MARIE    :	1970  MRN:	[id number]    Study Start Date/Time:	2023, 11:56:51 AM  Study End Date/Time:    Referred by: Dr. Elvis Pierre    Brief Clinical History:  VINCENT MARIE is a 53 year old Male with history of seizures, facial twitching episodes s/p cardiothoracic surgery; study performed to investigate for seizures or markers of epilepsy.   Technologist notes: -  Diagnosis Code:  G40.209 focal epilepsy    Pertinent Medication:  valproate sodium  IVPB 1000 milliGRAM(s) IV Intermittent every 12 hours  lacosamide IVPB 100 milliGRAM(s) IV Intermittent every 12 hours  dexMEDEtomidine Infusion 0.2 MICROgram(s)/kG/Hr IV Continuous    Acquisition Details:  Electroencephalography was acquired using a minimum of 21 channels on an aaTag Neurology system v 9.3.1 with electrode placement according to the standard International 10-20 system following ACNS (American Clinical Neurophysiology Society) guidelines.  Anterior temporal T1 and T2 electrodes were utilized whenever possible.  The XLTEK automated spike & seizure detections were all reviewed in detail, in addition to the entire raw EEG.    Findings:  Background:  continuous, with predominantly theta > delta frequencies.  Voltage:  Normal (20+ uV)  Organization:  Unclear  Posterior Dominant Rhythm:  No clear PDR absent  Sleep:  Absent.  Focal abnormalities:  No persistent asymmetries of voltage or frequency.  Spontaneous Activity:  Rare (<1/Hr)  small amplitude simply configured discharges in the Left parasagittal region, possibly epileptiform, seen only during the early portion of the study.  Events:  1)	No electrographic seizures or significant clinical events occurred during this study.  Impression:   Abnormal continuous EEG  1)	due to diffuse or generalized slowing  2)	Left posterior central possible epileptiform discharges earlier in the study    Clinical Correlation:  Findings consistent with diffuse or multifocal electrocerebral dysfunction, a non-specific finding that can be  secondary to structural, toxic//metabolic, degenerative, post-ictal or other etiologies.  Possible epileptic potential seen early in the study, over the left posterior central region, disappearing as more delta activity was evident later in the study.        Elvis Alves MD  Attending Neurologist, [facility]

## 2023-05-14 NOTE — PROGRESS NOTE ADULT - SUBJECTIVE AND OBJECTIVE BOX
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 Recent admission 3/20-4/14 during which patient underwent replacement of transverse aortic arch, second stage thoracic endovascular aortic repair, aorto-axillary bypass, AV replacement (bio 23mm), and CABG x 1 (SVG-RCA) EF 75% (Ignacio, 3/20). Post op cb lactic acidosis, severe cardiogenic and vasogenic shock, TREY requiring CVVHD and temporary dialysis requirement. Recent admission 4/27-4/30 for seizure episode, were his AEDS were adjusted. Presented to [place] ED 5/4 for epigastric pain. CT exam which showed known endoleak for which pt was tx to [facility]. Patient underwent TEVAR with Dr. Pierre 5/5, LD placed prior by NSGY. Arrived intubated on propofol. 5/6 extubated. 1 unit pRBC, CTH performed for decreased LE mvmt. Improved later in day. 5/7 LD clamped and patient ambulated, plan for dc. 5/8 US showing groin seroma and hematoma. CT scan showing necrotizing pancreatitis. Zosyn started and Gen surg consulted. Recommended IR and GI consults and further evaluation with imaging. Gi believes 2/2 insults from surgeries back in March and recommended outpatient fu. 5/9 new RIJ and PA catheter placed, R pigtail placed cb pneumothorax. 5/11 gen surg signed off. Repeat imaging showing concern for possible malignancy in pancreatic tail. Radiology attending called during day to say findings likely 2/2 pancreatitis and recommended outpatient contrast study. Around 7pm patient had questionable seizure-like episode. Given 2mg ativan. Labs sig for Hb drop to ~6. TREY with oliguria. Stat CT showing [id number] hematoma, mod-large ascites with layering in pelvis. Given 4 units pRBC and sent to IR for possible intervention. 5/13 returned from Ir intubated, no arterial bleeder identified therefore no intervention performed. Hb ~8, gen surg recommending additional transfusion. 5/13 Total transfusion 7 pRBC, 2 cryo, 2 FFP, 1 plt. Was given additional blood overnight (1 pRBC). Gen surg on board     FAMILY HISTORY:  No pertinent family history in first degree relatives  PAST MEDICAL & SURGICAL HISTORY:  HTN (hypertension)  Aortic dissection  CAD (coronary artery diseas  Seizure disorder  S/P aortic bifurcation bypass graft  S/P CABG x 1        Patient is a 53y old  Male who presents with a chief complaint of endoleak, abdominal pain (13 May 2023 21:40)      14 system review was unremarkable    Vital signs, hemodynamic and respiratory parameters were reviewed from the bedside nursing flowsheet.  ICU Vital Signs Last 24 Hrs  T(C): 37.1 (14 May 2023 08:28), Max: 37.1 (14 May 2023 08:28)  T(F): 98.8 (14 May 2023 08:28), Max: 98.8 (14 May 2023 08:28)  HR: 110 (14 May 2023 09:00) (84 - 136)  BP: 102/74 (14 May 2023 07:00) (102/74 - 102/74)  BP(mean): 84 (14 May 2023 07:00) (84 - 84)  ABP: 120/73 (14 May 2023 09:00) (106/68 - 151/84)  ABP(mean): 92 (14 May 2023 09:00) (84 - 112)  RR: 20 (14 May 2023 09:00) (12 - 23)  SpO2: 98% (14 May 2023 09:00) (96% - 100%)    O2 Parameters below as of 14 May 2023 10:00  Patient On (Oxygen Delivery Method): ventilator    O2 Concentration (%): 60      Adult Advanced Hemodynamics Last 24 Hrs  CVP(mm Hg): 62 (14 May 2023 09:00) (2 - 62)  CVP(cm H2O): --  CO: --  CI: --  PA: --  PA(mean): --  PCWP: --  SVR: --  SVRI: --  PVR: --  PVRI: --, ABG - ( 14 May 2023 05:21 )  pH, Arterial: 7.45  pH, Blood: x     /  pCO2: 31    /  pO2: 75    / HCO3: 22    / Base Excess: -1.6  /  SaO2: 98.0              Mode: AC/ CMV (Assist Control/ Continuous Mandatory Ventilation)  RR (machine): 12  TV (machine): 500  FiO2: 60  PEEP: 7  ITime: 1  MAP: 11  PIP: 21    Intake and output was reviewed and the fluid balance was calculated  Daily     Daily   I&O's Summary    13 May 2023 07:01  -  14 May 2023 07:00  --------------------------------------------------------  IN: 3097.7 mL / OUT: 1430 mL / NET: 1667.7 mL    14 May 2023 07:01  -  14 May 2023 09:20  --------------------------------------------------------  IN: 96.1 mL / OUT: 70 mL / NET: 26.1 mL        All lines and drain sites were assessed  [facility] [place] [facility]      POCT Blood Glucose.: 170 mg/dL (12 May 2023 18:58)      Neuro:  HEENT:  Heart:  Lungs:  Abdomen:  Extremities:    Lines:    Tubes:      labs  CBC Full  -  ( 14 May 2023 04:54 )  WBC Count : 12.43 K/uL  RBC Count : 3.59 M/uL  Hemoglobin : 10.8 g/dL  Hematocrit : 30.9 %  Platelet Count - Automated : 136 K/uL  Mean Cell Volume : 86.1 fl  Mean Cell Hemoglobin : 30.1 pg  Mean Cell Hemoglobin Concentration : 35.0 gm/dL  Auto Neutrophil # : x  Auto Lymphocyte # : x  Auto Monocyte # : x  Auto Eosinophil # : x  Auto Basophil # : x  Auto Neutrophil % : x  Auto Lymphocyte % : x  Auto Monocyte % : x  Auto Eosinophil % : x  Auto Basophil % : x    05-14    136  |  102  |  53<H>  ----------------------------<  106<H>  4.6   |  22  |  2.08<H>    Ca    8.7      14 May 2023 04:54  Phos  6.0     05-14  Mg     2.3     05-14    TPro  5.9<L>  /  Alb  3.0<L>  /  TBili  0.8  /  DBili  x   /  AST  18  /  ALT  8<L>  /  AlkPhos  69  05-14    PT/INR - ( 14 May 2023 04:54 )   PT: 14.5 sec;   INR: 1.22          PTT - ( 14 May 2023 04:54 )  PTT:28.6 sec  The current medications were reviewed   MEDICATIONS  (STANDING):  aspirin enteric coated 81 milliGRAM(s) Oral daily  atorvastatin 20 milliGRAM(s) Oral at bedtime  chlorhexidine 0.12% Liquid 15 milliLiter(s) Oral Mucosa every 12 hours  chlorhexidine 2% Cloths 1 Application(s) Topical daily  dexMEDEtomidine Infusion 0.2 MICROgram(s)/kG/Hr (3.5 mL/Hr) IV Continuous <Continuous>  lacosamide IVPB 100 milliGRAM(s) IV Intermittent every 12 hours  pantoprazole  Injectable 40 milliGRAM(s) IV Push daily  phenylephrine    Infusion 0.1 MICROgram(s)/kG/Min (2.63 mL/Hr) IV Continuous <Continuous>  polyethylene glycol 3350 17 Gram(s) Oral daily  propofol Infusion 10 MICROgram(s)/kG/Min (4.2 mL/Hr) IV Continuous <Continuous>  senna 2 Tablet(s) Oral at bedtime  sodium chloride 0.9%. 1000 milliLiter(s) (10 mL/Hr) IV Continuous <Continuous>  valproate sodium  IVPB 1000 milliGRAM(s) IV Intermittent every 12 hours  vasopressin Infusion 0.01 Unit(s)/Min (1.5 mL/Hr) IV Continuous <Continuous>    MEDICATIONS  (PRN):  acetaminophen     Tablet .. 650 milliGRAM(s) Oral every 6 hours PRN Mild Pain (1 - 3)  acetaminophen   IVPB .. 1000 milliGRAM(s) IV Intermittent once PRN Mild Pain (1 - 3)  fentaNYL    Injectable 25 MICROGram(s) IV Push every 3 hours PRN for breakthrough pain      Assessment/Plan:         s/p cardiac surgery    Acute systolic and diastolic heart failure evidenced by SOB and parenchymal infiltrates; will treat with diuresis    Cardiogenic shock on ionotropy    Vasogenic shock due to hypotension in the cticu , will keep on pressors    Hypovolemic shock - > 20% intravascular depletion will replete volume    Acute blood loss anemia with relative hypotension treated with > 1 unit PC    Acute respiratory failure ruled in due to hypoxemia, O2 sats < 91% on RA treated with HFNC    Acute respiratory failure ruled in due to hypercapnea on abg will treat with mechanical ventilation    Acute respiratory failure ruled in due to prolonged mechanical ventilation > 24 hrs on the vent due to failure to wean due to weak respiratory mechanics    Toxic metabolic encephalopathy ; sundowning due to anesthesia pain medications    Acidosis evidenced by anion gap and negative base excess    Acute kidney injury - creatinine > 0.3 due to combined prerenal and intrarenal factors can presume ATN    ESRD    Acute venu-operative ischemic stroke    Moderate protein calorie malutrition    Diet as tolerated  Replete lytes prn  Monitor CT output  GI/DVT PPX  Bowel Regimen  Pain control  OOB with PT      Close hemodynamic, ventilatory and drain monitoring and management per post op routine  Monitor for arrhythmias and monitor parameters for organ perfusion  Beta blockade not administered due to hemodynamic instability and bradycardia  Monitor neurologic status  Head of the bed should remain elevated to 45 deg   Chest PT and IS will be encouraged  Monitor adequacy of oxygenation and ventilation and attempt to wean oxygen  Antibiotic regimen will be tailored to the clinical, laboratory and microbiologic data  Nutritional goals will be met using po eventually, ensure adequate caloric intake and monitor the same  Stress ulcer and VTE prophylaxis will be achieved    Glycemic control is satisfactory  Electrolytes have been repleted as necessary and wound care has been carried out   Pain control has been achieved.   Aggressive physical therapy and early mobility and ambulation goals will be met   The family was updated about the course and plan.    CRITICAL CARE TIME personally provided by me  in evaluation and management, reassessments, review and interpretation of labs and x-rays, ventilator and hemodynamic management, formulating a plan and coordinating care: ___105____ MIN.  Time does not include procedural time.    CTICU ATTENDING     					  Alexx Brooks MD CTICU  CRITICAL  CARE  attending     Hand off received 					   Pertinent clinical, laboratory, radiographic, hemodynamic, echocardiographic, respiratory data, microbiologic data and chart were reviewed and analyzed frequently throughout the course of the day  Patient seen and examined with CTS/ SH attending at bedside  Pt is a 53yr old male with PMH HTN, type A dissection sp Dacron grafts and AV resuspension (2013), CAD sp CABG x 1 (Adam, 5/2013, SVG-RCA), seizures, admitted for surgical mgmt of progression of aneurysmal disease. Recent admission 3/20-4/14 during which patient underwent replacement of transverse aortic arch, second stage thoracic endovascular aortic repair, aorto-axillary bypass, AV replacement (bio 23mm), and CABG x 1 (SVG-RCA) EF 75% (Ignacio, 3/20). Post op cb lactic acidosis, severe cardiogenic and vasogenic shock, TREY requiring CVVHD and temporary dialysis requirement. Recent admission 4/27-4/30 for seizure episode, were his AEDS were adjusted. Presented [facility] 5[facility]4 for epigastric pain. CT exam which showed known endoleak for which pt was tx to [facility]. Patient underwent TEVAR with Dr. Pierre 5/5, LD placed prior by NSGY. Arrived intubated on propofol. 5/6 extubated. 1 unit pRBC, CTH performed for decreased LE mvmt. Improved later in day. 5/7 LD clamped and patient ambulated, plan for dc. 5/8 US showing groin seroma and hematoma. CT scan showing necrotizing pancreatitis. Zosyn started and Gen surg consulted. Recommended IR and GI consults and further evaluation with imaging. Gi believes 2/2 insults from surgeries back in March and recommended outpatient fu. 5/9 new RIJ and PA catheter placed, R pigtail placed cb pneumothorax. 5/11 gen surg signed off. Repeat imaging showing concern for possible malignancy in pancreatic tail. Radiology attending called during day to say findings likely 2/2 pancreatitis and recommended outpatient contrast study. Around 7pm patient had questionable seizure-like episode. Given 2mg ativan. Labs sig for Hb drop to ~6. TREY with oliguria. Stat CT showing [id number] hematoma, mod-large ascites with layering in pelvis. Given 4 units pRBC and sent to IR for possible intervention. 5/13 returned from Ir intubated, no arterial bleeder identified therefore no intervention performed. Hb ~8, gen surg recommending additional transfusion. 5/13 Total transfusion 7 pRBC, 2 cryo, 2 FFP, 1 plt. Was given additional blood overnight (1 pRBC). Gen surg on board.    FAMILY HISTORY:  No pertinent family history in first degree relatives  PAST MEDICAL & SURGICAL HISTORY:  HTN (hypertension)  Aortic dissection  CAD (coronary artery diseas  Seizure disorder  S/P aortic bifurcation bypass graft  S/P CABG x 1        Patient is a 53y old  Male who presents with a chief complaint of endoleak, abdominal pain (13 May 2023 21:40)      14 system review was unremarkable    Vital signs, hemodynamic and respiratory parameters were reviewed from the bedside nursing flowsheet.  ICU Vital Signs Last 24 Hrs  T(C): 37.1 (14 May 2023 08:28), Max: 37.1 (14 May 2023 08:28)  T(F): 98.8 (14 May 2023 08:28), Max: 98.8 (14 May 2023 08:28)  HR: 110 (14 May 2023 09:00) (84 - 136)  BP: 102/74 (14 May 2023 07:00) (102/74 - 102/74)  BP(mean): 84 (14 May 2023 07:00) (84 - 84)  ABP: 120/73 (14 May 2023 09:00) (106/68 - 151/84)  ABP(mean): 92 (14 May 2023 09:00) (84 - 112)  RR: 20 (14 May 2023 09:00) (12 - 23)  SpO2: 98% (14 May 2023 09:00) (96% - 100%)    O2 Parameters below as of 14 May 2023 10:00  Patient On (Oxygen Delivery Method): ventilator    O2 Concentration (%): 60      Adult Advanced Hemodynamics Last 24 Hrs  CVP(mm Hg): 62 (14 May 2023 09:00) (2 - 62)  CVP(cm H2O): --  CO: --  CI: --  PA: --  PA(mean): --  PCWP: --  SVR: --  SVRI: --  PVR: --  PVRI: --, ABG - ( 14 May 2023 05:21 )  pH, Arterial: 7.45  pH, Blood: x     /  pCO2: 31    /  pO2: 75    / HCO3: 22    / Base Excess: -1.6  /  SaO2: 98.0              Mode: AC/ CMV (Assist Control/ Continuous Mandatory Ventilation)  RR (machine): 12  TV (machine): 500  FiO2: 60  PEEP: 7  ITime: 1  MAP: 11  PIP: 21    Intake and output was reviewed and the fluid balance was calculated  Daily     Daily   I&O's Summary    13 May 2023 07:01  -  14 May 2023 07:00  --------------------------------------------------------  IN: 3097.7 mL / OUT: 1430 mL / NET: 1667.7 mL    14 May 2023 07:01  -  14 May 2023 09:20  --------------------------------------------------------  IN: 96.1 mL / OUT: 70 mL / NET: 26.1 mL        All lines and drain sites were assessed  Glycemic trend was reviewed    POCT Blood Glucose.: 170 mg/dL (12 May 2023 18:58)    Neuro: sedated  HEENT: ett  Heart: s1 s2  Lungs: clear bl  Abdomen: mildly firm, nt, nd  Extremities: wwp, groin seroma x 2    Lines:  R axillary arterial 5/13  RIJ 5/9    labs  CBC Full  -  ( 14 May 2023 04:54 )  WBC Count : 12.43 K/uL  RBC Count : 3.59 M/uL  Hemoglobin : 10.8 g/dL  Hematocrit : 30.9 %  Platelet Count - Automated : 136 K/uL  Mean Cell Volume : 86.1 fl  Mean Cell Hemoglobin : 30.1 pg  Mean Cell Hemoglobin Concentration : 35.0 gm/dL  Auto Neutrophil # : x  Auto Lymphocyte # : x  Auto Monocyte # : x  Auto Eosinophil # : x  Auto Basophil # : x  Auto Neutrophil % : x  Auto Lymphocyte % : x  Auto Monocyte % : x  Auto Eosinophil % : x  Auto Basophil % : x    05-14    136  |  102  |  53<H>  ----------------------------<  106<H>  4.6   |  22  |  2.08<H>    Ca    8.7      14 May 2023 04:54  Phos  6.0     05-14  Mg     2.3     05-14    TPro  5.9<L>  /  Alb  3.0<L>  /  TBili  0.8  /  DBili  x   /  AST  18  /  ALT  8<L>  /  AlkPhos  69  05-14    PT/INR - ( 14 May 2023 04:54 )   PT: 14.5 sec;   INR: 1.22          PTT - ( 14 May 2023 04:54 )  PTT:28.6 sec  The current medications were reviewed   MEDICATIONS  (STANDING):  aspirin enteric coated 81 milliGRAM(s) Oral daily  atorvastatin 20 milliGRAM(s) Oral at bedtime  chlorhexidine 0.12% Liquid 15 milliLiter(s) Oral Mucosa every 12 hours  chlorhexidine 2% Cloths 1 Application(s) Topical daily  dexMEDEtomidine Infusion 0.2 MICROgram(s)/kG/Hr (3.5 mL/Hr) IV Continuous <Continuous>  lacosamide IVPB 100 milliGRAM(s) IV Intermittent every 12 hours  pantoprazole  Injectable 40 milliGRAM(s) IV Push daily  phenylephrine    Infusion 0.1 MICROgram(s)/kG/Min (2.63 mL/Hr) IV Continuous <Continuous>  polyethylene glycol 3350 17 Gram(s) Oral daily  propofol Infusion 10 MICROgram(s)/kG/Min (4.2 mL/Hr) IV Continuous <Continuous>  senna 2 Tablet(s) Oral at bedtime  sodium chloride 0.9%. 1000 milliLiter(s) (10 mL/Hr) IV Continuous <Continuous>  valproate sodium  IVPB 1000 milliGRAM(s) IV Intermittent every 12 hours  vasopressin Infusion 0.01 Unit(s)/Min (1.5 mL/Hr) IV Continuous <Continuous>    MEDICATIONS  (PRN):  acetaminophen     Tablet .. 650 milliGRAM(s) Oral every 6 hours PRN Mild Pain (1 - 3)  acetaminophen   IVPB .. 1000 milliGRAM(s) IV Intermittent once PRN Mild Pain (1 - 3)  fentaNYL    Injectable 25 MICROGram(s) IV Push every 3 hours PRN for breakthrough pain      Assessment/Plan:  53yr old male with PMH HTN, type A dissection sp Dacron grafts and AV resuspension (2013), CAD sp CABG x 1 (Adam, 5/2013, SVG-RCA), seizures, admitted for surgical mgmt of progression of aneurysmal disease. Recent admission 3/20-4/14 during which patient underwent replacement of transverse aortic arch, second stage thoracic endovascular aortic repair, aorto-axillary bypass, AV replacement (bio 23mm), and CABG x 1 (SVG-RCA) EF 75% (Ignacio, 3/20). Post op cb lactic acidosis, severe cardiogenic and vasogenic shock, TREY requiring CVVHD and temporary dialysis requirement. Recent admission 4/27-4/30 for seizure episode, were his AEDS were adjusted. Presented to [facility] 5/4 for epigastric pain. CT exam which showed known endoleak for which pt was tx to [facility]. Patient underwent TEVAR with Dr. Pierre 5/5, LD placed prior by NSYAMILET. Arrived intubated on propofol. Given 2 units intraop, 1L IVF, 100cc urine output.    POD9 TEVAR (Ignacio, 5/5)  Pancreatitis cb large hematoma  Sp IR attempt at embolization, no arterial bleed identified  Hemorrhagic shock-sp 7 units pRBC, 2 FFP, 2 cryo, 1 plt  Serial cbc  Anti-emetics prn  Acute respiratory failure requiring mechanical ventilation-keep for now  Thrombocytopenia-monitor  TREY-monitor urine output-likely ischemic insult from hemorrhage  Aspirin  Statin  On steroids-wean  AEDS  Appreciate neuro recs  vEEG-no events, dc  Replete lytes prn  GI/DVT PPX  Close hemodynamic, ventilatory and drain monitoring and management per post op routine  Monitor for arrhythmias and monitor parameters for organ perfusion  Beta blockade not administered due to hemodynamic instability and bradycardia  Monitor neurologic status  Head of the bed should remain elevated to 45 deg   Chest PT and IS will be encouraged  Monitor adequacy of oxygenation and ventilation and attempt to wean oxygen  Antibiotic regimen will be tailored to the clinical, laboratory and microbiologic data  Nutritional goals will be met using po eventually, ensure adequate caloric intake and monitor the same  Stress ulcer and VTE prophylaxis will be achieved    Glycemic control is satisfactory  Electrolytes have been repleted as necessary and wound care has been carried out   Pain control has been achieved.   Aggressive physical therapy and early mobility and ambulation goals will be met   The family was updated about the course and plan.    CRITICAL CARE TIME personally provided by me  in evaluation and management, reassessments, review and interpretation of labs and x-rays, ventilator and hemodynamic management, formulating a plan and coordinating care: ___105____ MIN.  Time does not include procedural time.    CTICU ATTENDING     					  Alexx Brooks MD

## 2023-05-15 LAB
ALBUMIN SERPL ELPH-MCNC: 2.6 G/DL — LOW (ref 3.3–5)
ALBUMIN SERPL ELPH-MCNC: 2.7 G/DL — LOW (ref 3.3–5)
ALP SERPL-CCNC: 65 U/L — SIGNIFICANT CHANGE UP (ref 40–120)
ALP SERPL-CCNC: 70 U/L — SIGNIFICANT CHANGE UP (ref 40–120)
ALP SERPL-CCNC: 72 U/L — SIGNIFICANT CHANGE UP (ref 40–120)
ALP SERPL-CCNC: 81 U/L — SIGNIFICANT CHANGE UP (ref 40–120)
ALT FLD-CCNC: 6 U/L — LOW (ref 10–45)
ALT FLD-CCNC: 7 U/L — LOW (ref 10–45)
ALT FLD-CCNC: 7 U/L — LOW (ref 10–45)
ALT FLD-CCNC: 8 U/L — LOW (ref 10–45)
AMMONIA BLD-MCNC: 19 UMOL/L — SIGNIFICANT CHANGE UP (ref 11–55)
ANION GAP SERPL CALC-SCNC: 13 MMOL/L — SIGNIFICANT CHANGE UP (ref 5–17)
ANION GAP SERPL CALC-SCNC: 13 MMOL/L — SIGNIFICANT CHANGE UP (ref 5–17)
ANION GAP SERPL CALC-SCNC: 14 MMOL/L — SIGNIFICANT CHANGE UP (ref 5–17)
ANION GAP SERPL CALC-SCNC: 14 MMOL/L — SIGNIFICANT CHANGE UP (ref 5–17)
APTT BLD: 32.1 SEC — SIGNIFICANT CHANGE UP (ref 27.5–35.5)
APTT BLD: 32.7 SEC — SIGNIFICANT CHANGE UP (ref 27.5–35.5)
APTT BLD: 32.9 SEC — SIGNIFICANT CHANGE UP (ref 27.5–35.5)
APTT BLD: 33.7 SEC — SIGNIFICANT CHANGE UP (ref 27.5–35.5)
AST SERPL-CCNC: 17 U/L — SIGNIFICANT CHANGE UP (ref 10–40)
AST SERPL-CCNC: 19 U/L — SIGNIFICANT CHANGE UP (ref 10–40)
AST SERPL-CCNC: 20 U/L — SIGNIFICANT CHANGE UP (ref 10–40)
AST SERPL-CCNC: 20 U/L — SIGNIFICANT CHANGE UP (ref 10–40)
BILIRUB SERPL-MCNC: 1.1 MG/DL — SIGNIFICANT CHANGE UP (ref 0.2–1.2)
BILIRUB SERPL-MCNC: 1.4 MG/DL — HIGH (ref 0.2–1.2)
BUN SERPL-MCNC: 51 MG/DL — HIGH (ref 7–23)
BUN SERPL-MCNC: 53 MG/DL — HIGH (ref 7–23)
BUN SERPL-MCNC: 54 MG/DL — HIGH (ref 7–23)
BUN SERPL-MCNC: 54 MG/DL — HIGH (ref 7–23)
CALCIUM SERPL-MCNC: 8 MG/DL — LOW (ref 8.4–10.5)
CALCIUM SERPL-MCNC: 8.6 MG/DL — SIGNIFICANT CHANGE UP (ref 8.4–10.5)
CALCIUM SERPL-MCNC: 8.6 MG/DL — SIGNIFICANT CHANGE UP (ref 8.4–10.5)
CALCIUM SERPL-MCNC: 8.7 MG/DL — SIGNIFICANT CHANGE UP (ref 8.4–10.5)
CHLORIDE SERPL-SCNC: 104 MMOL/L — SIGNIFICANT CHANGE UP (ref 96–108)
CHLORIDE SERPL-SCNC: 104 MMOL/L — SIGNIFICANT CHANGE UP (ref 96–108)
CHLORIDE SERPL-SCNC: 105 MMOL/L — SIGNIFICANT CHANGE UP (ref 96–108)
CHLORIDE SERPL-SCNC: 106 MMOL/L — SIGNIFICANT CHANGE UP (ref 96–108)
CO2 SERPL-SCNC: 21 MMOL/L — LOW (ref 22–31)
CO2 SERPL-SCNC: 22 MMOL/L — SIGNIFICANT CHANGE UP (ref 22–31)
CREAT SERPL-MCNC: 2.03 MG/DL — HIGH (ref 0.5–1.3)
CREAT SERPL-MCNC: 2.06 MG/DL — HIGH (ref 0.5–1.3)
CREAT SERPL-MCNC: 2.12 MG/DL — HIGH (ref 0.5–1.3)
CREAT SERPL-MCNC: 2.18 MG/DL — HIGH (ref 0.5–1.3)
EGFR: 35 ML/MIN/1.73M2 — LOW
EGFR: 36 ML/MIN/1.73M2 — LOW
EGFR: 38 ML/MIN/1.73M2 — LOW
EGFR: 38 ML/MIN/1.73M2 — LOW
GAS PNL BLDA: SIGNIFICANT CHANGE UP
GLUCOSE SERPL-MCNC: 91 MG/DL — SIGNIFICANT CHANGE UP (ref 70–99)
GLUCOSE SERPL-MCNC: 91 MG/DL — SIGNIFICANT CHANGE UP (ref 70–99)
GLUCOSE SERPL-MCNC: 95 MG/DL — SIGNIFICANT CHANGE UP (ref 70–99)
GLUCOSE SERPL-MCNC: 96 MG/DL — SIGNIFICANT CHANGE UP (ref 70–99)
HCT VFR BLD CALC: 23.3 % — LOW (ref 39–50)
HCT VFR BLD CALC: 29.7 % — LOW (ref 39–50)
HCT VFR BLD CALC: 30 % — LOW (ref 39–50)
HCT VFR BLD CALC: 30.2 % — LOW (ref 39–50)
HGB BLD-MCNC: 10.5 G/DL — LOW (ref 13–17)
HGB BLD-MCNC: 10.5 G/DL — LOW (ref 13–17)
HGB BLD-MCNC: 10.7 G/DL — LOW (ref 13–17)
HGB BLD-MCNC: 8.1 G/DL — LOW (ref 13–17)
INR BLD: 1.28 — HIGH (ref 0.88–1.16)
INR BLD: 1.35 — HIGH (ref 0.88–1.16)
INR BLD: 1.36 — HIGH (ref 0.88–1.16)
INR BLD: 1.4 — HIGH (ref 0.88–1.16)
LACTATE SERPL-SCNC: 0.8 MMOL/L — SIGNIFICANT CHANGE UP (ref 0.5–2)
LACTATE SERPL-SCNC: 0.9 MMOL/L — SIGNIFICANT CHANGE UP (ref 0.5–2)
MAGNESIUM SERPL-MCNC: 2.2 MG/DL — SIGNIFICANT CHANGE UP (ref 1.6–2.6)
MAGNESIUM SERPL-MCNC: 2.2 MG/DL — SIGNIFICANT CHANGE UP (ref 1.6–2.6)
MAGNESIUM SERPL-MCNC: 2.3 MG/DL — SIGNIFICANT CHANGE UP (ref 1.6–2.6)
MAGNESIUM SERPL-MCNC: 2.3 MG/DL — SIGNIFICANT CHANGE UP (ref 1.6–2.6)
MCHC RBC-ENTMCNC: 30.1 PG — SIGNIFICANT CHANGE UP (ref 27–34)
MCHC RBC-ENTMCNC: 30.1 PG — SIGNIFICANT CHANGE UP (ref 27–34)
MCHC RBC-ENTMCNC: 30.3 PG — SIGNIFICANT CHANGE UP (ref 27–34)
MCHC RBC-ENTMCNC: 30.5 PG — SIGNIFICANT CHANGE UP (ref 27–34)
MCHC RBC-ENTMCNC: 34.8 GM/DL — SIGNIFICANT CHANGE UP (ref 32–36)
MCHC RBC-ENTMCNC: 35 GM/DL — SIGNIFICANT CHANGE UP (ref 32–36)
MCHC RBC-ENTMCNC: 35.4 GM/DL — SIGNIFICANT CHANGE UP (ref 32–36)
MCHC RBC-ENTMCNC: 35.4 GM/DL — SIGNIFICANT CHANGE UP (ref 32–36)
MCV RBC AUTO: 85.6 FL — SIGNIFICANT CHANGE UP (ref 80–100)
MCV RBC AUTO: 86 FL — SIGNIFICANT CHANGE UP (ref 80–100)
MCV RBC AUTO: 86 FL — SIGNIFICANT CHANGE UP (ref 80–100)
MCV RBC AUTO: 86.6 FL — SIGNIFICANT CHANGE UP (ref 80–100)
NRBC # BLD: 0 /100 WBCS — SIGNIFICANT CHANGE UP (ref 0–0)
PHOSPHATE SERPL-MCNC: 4.9 MG/DL — HIGH (ref 2.5–4.5)
PHOSPHATE SERPL-MCNC: 5 MG/DL — HIGH (ref 2.5–4.5)
PHOSPHATE SERPL-MCNC: 5.1 MG/DL — HIGH (ref 2.5–4.5)
PHOSPHATE SERPL-MCNC: 5.2 MG/DL — HIGH (ref 2.5–4.5)
PLATELET # BLD AUTO: 125 K/UL — LOW (ref 150–400)
PLATELET # BLD AUTO: 126 K/UL — LOW (ref 150–400)
PLATELET # BLD AUTO: 128 K/UL — LOW (ref 150–400)
PLATELET # BLD AUTO: 144 K/UL — LOW (ref 150–400)
POTASSIUM SERPL-MCNC: 3.2 MMOL/L — LOW (ref 3.5–5.3)
POTASSIUM SERPL-MCNC: 3.4 MMOL/L — LOW (ref 3.5–5.3)
POTASSIUM SERPL-MCNC: 3.8 MMOL/L — SIGNIFICANT CHANGE UP (ref 3.5–5.3)
POTASSIUM SERPL-MCNC: 3.8 MMOL/L — SIGNIFICANT CHANGE UP (ref 3.5–5.3)
POTASSIUM SERPL-SCNC: 3.2 MMOL/L — LOW (ref 3.5–5.3)
POTASSIUM SERPL-SCNC: 3.4 MMOL/L — LOW (ref 3.5–5.3)
POTASSIUM SERPL-SCNC: 3.8 MMOL/L — SIGNIFICANT CHANGE UP (ref 3.5–5.3)
POTASSIUM SERPL-SCNC: 3.8 MMOL/L — SIGNIFICANT CHANGE UP (ref 3.5–5.3)
PROT SERPL-MCNC: 5.2 G/DL — LOW (ref 6–8.3)
PROT SERPL-MCNC: 5.3 G/DL — LOW (ref 6–8.3)
PROT SERPL-MCNC: 5.3 G/DL — LOW (ref 6–8.3)
PROT SERPL-MCNC: 6.1 G/DL — SIGNIFICANT CHANGE UP (ref 6–8.3)
PROTHROM AB SERPL-ACNC: 15.3 SEC — HIGH (ref 10.5–13.4)
PROTHROM AB SERPL-ACNC: 16.1 SEC — HIGH (ref 10.5–13.4)
PROTHROM AB SERPL-ACNC: 16.2 SEC — HIGH (ref 10.5–13.4)
PROTHROM AB SERPL-ACNC: 16.7 SEC — HIGH (ref 10.5–13.4)
RBC # BLD: 2.69 M/UL — LOW (ref 4.2–5.8)
RBC # BLD: 3.47 M/UL — LOW (ref 4.2–5.8)
RBC # BLD: 3.49 M/UL — LOW (ref 4.2–5.8)
RBC # BLD: 3.51 M/UL — LOW (ref 4.2–5.8)
RBC # FLD: 14.9 % — HIGH (ref 10.3–14.5)
RBC # FLD: 15.5 % — HIGH (ref 10.3–14.5)
RBC # FLD: 15.6 % — HIGH (ref 10.3–14.5)
RBC # FLD: 15.9 % — HIGH (ref 10.3–14.5)
SODIUM SERPL-SCNC: 139 MMOL/L — SIGNIFICANT CHANGE UP (ref 135–145)
SODIUM SERPL-SCNC: 140 MMOL/L — SIGNIFICANT CHANGE UP (ref 135–145)
SODIUM SERPL-SCNC: 140 MMOL/L — SIGNIFICANT CHANGE UP (ref 135–145)
SODIUM SERPL-SCNC: 141 MMOL/L — SIGNIFICANT CHANGE UP (ref 135–145)
WBC # BLD: 6.5 K/UL — SIGNIFICANT CHANGE UP (ref 3.8–10.5)
WBC # BLD: 8.72 K/UL — SIGNIFICANT CHANGE UP (ref 3.8–10.5)
WBC # BLD: 8.9 K/UL — SIGNIFICANT CHANGE UP (ref 3.8–10.5)
WBC # BLD: 9.98 K/UL — SIGNIFICANT CHANGE UP (ref 3.8–10.5)
WBC # FLD AUTO: 6.5 K/UL — SIGNIFICANT CHANGE UP (ref 3.8–10.5)
WBC # FLD AUTO: 8.72 K/UL — SIGNIFICANT CHANGE UP (ref 3.8–10.5)
WBC # FLD AUTO: 8.9 K/UL — SIGNIFICANT CHANGE UP (ref 3.8–10.5)
WBC # FLD AUTO: 9.98 K/UL — SIGNIFICANT CHANGE UP (ref 3.8–10.5)

## 2023-05-15 PROCEDURE — 99231 SBSQ HOSP IP/OBS SF/LOW 25: CPT

## 2023-05-15 PROCEDURE — 99233 SBSQ HOSP IP/OBS HIGH 50: CPT

## 2023-05-15 PROCEDURE — 99291 CRITICAL CARE FIRST HOUR: CPT

## 2023-05-15 RX ORDER — ELECTROLYTE SOLUTION,INJ
1 VIAL (ML) INTRAVENOUS
Refills: 0 | Status: DISCONTINUED | OUTPATIENT
Start: 2023-05-15 | End: 2023-05-15

## 2023-05-15 RX ORDER — VALPROIC ACID (AS SODIUM SALT) 250 MG/5ML
750 SOLUTION, ORAL ORAL EVERY 12 HOURS
Refills: 0 | Status: DISCONTINUED | OUTPATIENT
Start: 2023-05-16 | End: 2023-05-16

## 2023-05-15 RX ORDER — LACOSAMIDE 50 MG/1
100 TABLET ORAL
Refills: 0 | Status: DISCONTINUED | OUTPATIENT
Start: 2023-05-15 | End: 2023-05-15

## 2023-05-15 RX ORDER — POTASSIUM CHLORIDE 20 MEQ
20 PACKET (EA) ORAL
Refills: 0 | Status: COMPLETED | OUTPATIENT
Start: 2023-05-15 | End: 2023-05-15

## 2023-05-15 RX ORDER — VALPROIC ACID (AS SODIUM SALT) 250 MG/5ML
750 SOLUTION, ORAL ORAL EVERY 12 HOURS
Refills: 0 | Status: DISCONTINUED | OUTPATIENT
Start: 2023-05-15 | End: 2023-05-16

## 2023-05-15 RX ORDER — BUMETANIDE 0.25 MG/ML
1 INJECTION INTRAMUSCULAR; INTRAVENOUS ONCE
Refills: 0 | Status: COMPLETED | OUTPATIENT
Start: 2023-05-15 | End: 2023-05-15

## 2023-05-15 RX ORDER — LACOSAMIDE 50 MG/1
200 TABLET ORAL EVERY 12 HOURS
Refills: 0 | Status: DISCONTINUED | OUTPATIENT
Start: 2023-05-16 | End: 2023-05-18

## 2023-05-15 RX ADMIN — CHLORHEXIDINE GLUCONATE 15 MILLILITER(S): 213 SOLUTION TOPICAL at 05:08

## 2023-05-15 RX ADMIN — LACOSAMIDE 120 MILLIGRAM(S): 50 TABLET ORAL at 06:38

## 2023-05-15 RX ADMIN — Medication 400 MILLIGRAM(S): at 00:39

## 2023-05-15 RX ADMIN — CHLORHEXIDINE GLUCONATE 15 MILLILITER(S): 213 SOLUTION TOPICAL at 18:30

## 2023-05-15 RX ADMIN — Medication 1000 MILLIGRAM(S): at 02:00

## 2023-05-15 RX ADMIN — Medication 57.5 MILLIGRAM(S): at 18:30

## 2023-05-15 RX ADMIN — BUMETANIDE 1 MILLIGRAM(S): 0.25 INJECTION INTRAMUSCULAR; INTRAVENOUS at 07:09

## 2023-05-15 RX ADMIN — PROPOFOL 4.2 MICROGRAM(S)/KG/MIN: 10 INJECTION, EMULSION INTRAVENOUS at 00:11

## 2023-05-15 RX ADMIN — Medication 100 MILLIEQUIVALENT(S): at 21:12

## 2023-05-15 RX ADMIN — LACOSAMIDE 120 MILLIGRAM(S): 50 TABLET ORAL at 13:54

## 2023-05-15 RX ADMIN — PANTOPRAZOLE SODIUM 40 MILLIGRAM(S): 20 TABLET, DELAYED RELEASE ORAL at 13:26

## 2023-05-15 RX ADMIN — Medication 1 EACH: at 18:30

## 2023-05-15 RX ADMIN — LACOSAMIDE 120 MILLIGRAM(S): 50 TABLET ORAL at 22:18

## 2023-05-15 RX ADMIN — Medication 100 MILLIEQUIVALENT(S): at 19:51

## 2023-05-15 RX ADMIN — FENTANYL CITRATE 25 MICROGRAM(S): 50 INJECTION INTRAVENOUS at 23:53

## 2023-05-15 RX ADMIN — Medication 81 MILLIGRAM(S): at 13:26

## 2023-05-15 RX ADMIN — CHLORHEXIDINE GLUCONATE 1 APPLICATION(S): 213 SOLUTION TOPICAL at 05:05

## 2023-05-15 RX ADMIN — Medication 60 MILLIGRAM(S): at 05:08

## 2023-05-15 NOTE — PROGRESS NOTE ADULT - ASSESSMENT
54 y/o Male pt with PMH HTN, type A aortic dissection s/p Dacron grafts, AV resuspension in 2013, CAD s/p CABG x 1 SVG to RCA (5/2013 with Dr. Medina), seizure disorder (last episode on 7/4/22) S/P replacement of transverse aortic arch, second stage thoracic endovascular aortic repair, aorto-axillary bypass, AV replacement (bio 23mm), in APr 2023 and CABG x 1 (SVG-RCA) EF 75% ([place], 3/20) now s/p 2nd state TEVAR on 5/5 for whom surgery was consulted for finding of acute pancreatitis with large peripancreatic/perigastric fluid collections on CTA abdomen on 5/8. Epilepsy team consulted after patient presenting with an episode of altered mental status, described by PA of facial twitching and blinking for 5 minutes that resolved after placed on Ativan 2 mg IV. After that patient has been lethargic progressively improving but still unresponsive however the patient is on precedex. No new seizure described by primary team. Course now complicated with pancreatitis possibly due to adverse effect of VA and anemia 2/2 to blood loss iso pancreatic head bleeding     Impression: Breakthrough provoked seizure in postop (severe anemia, electrolytes derangements, TREY over CKD, recent surgery) s/p Ativan IV in patient with PHMx of epilepsy well controlled. Another option is syncope 2/2 TME (severe anemia, electrolytes derangements, TREY over CKD, recent surgery). Rule out subclinic seizures/non-convulsive status -- likely negative myoclonus witness 5/14 2/2 to metabolic toxic causes    PLAN:  ABC and stabilize vitals per primary team.   - Continue with AED lacosamide 100 mg BID and  - decrease Divalproex ER 1000 mg BID to 750mg PM 5/15  - Cont Divalproex ER 500mg BID starting 5/16  - continue vEEG  - F/U ammonia  - F/u lacosamide level    Ativan 2mg IVP for seizures > 2mins  Keep Mg>2 and K >4.   Management per primary team.     Please call us if any questions or neurological changes.     Case discussed with epilepsy attending Dr. Flores   52 y/o Male pt with PMH HTN, type A aortic dissection s/p Dacron grafts, AV resuspension in 2013, CAD s/p CABG x 1 SVG to RCA (5/2013 with Dr. Medina), seizure disorder (last episode on 7/4/22) S/P replacement of transverse aortic arch, second stage thoracic endovascular aortic repair, aorto-axillary bypass, AV replacement (bio 23mm), in APr 2023 and CABG x 1 (SVG-RCA) EF 75% ([place], 3/20) now s/p 2nd state TEVAR on 5/5 for whom surgery was consulted for finding of acute pancreatitis with large peripancreatic/perigastric fluid collections on CTA abdomen on 5/8. Epilepsy team consulted after patient presenting with an episode of altered mental status, described by PA of facial twitching and blinking for 5 minutes that resolved after placed on Ativan 2 mg IV. After that patient has been lethargic progressively improving but still unresponsive however the patient is on precedex. No new seizure described by primary team. Course now complicated with pancreatitis possibly due to adverse effect of VA and anemia 2/2 to blood loss iso pancreatic head bleeding     Impression: Breakthrough provoked seizure in postop (severe anemia, electrolytes derangements, TREY over CKD, recent surgery) s/p Ativan IV in patient with PHMx of epilepsy well controlled. Another option is syncope 2/2 TME (severe anemia, electrolytes derangements, TREY over CKD, recent surgery). Rule out subclinic seizures/non-convulsive status -- likely negative myoclonus witness 5/14 2/2 to metabolic toxic causes    PLAN:  ABC and stabilize vitals per primary team.   - decrease Divalproex ER 1000 mg BID to 750mg PM tonight 5/15  - Decrease again Divalproex ER 500mg BID starting tomorrow AM 5/16  - Increase Vimpat from 100mg TID to 200mg BID tomorrow AM 5/16  - continue vEEG  - F/U ammonia  - F/u lacosamide level    Ativan 2mg IVP for seizures > 2mins  Keep Mg>2 and K >4.   Management per primary team.     Please call us if any questions or neurological changes.     Case discussed with epilepsy attending Dr. Flores   52 y/o Male pt with PMH HTN, type A aortic dissection s/p Dacron grafts, AV resuspension in 2013, CAD s/p CABG x 1 SVG to RCA (5/2013 with Dr. Medina), seizure disorder (last episode on 7/4/22) S/P replacement of transverse aortic arch, second stage thoracic endovascular aortic repair, aorto-axillary bypass, AV replacement (bio 23mm), in APr 2023 and CABG x 1 (SVG-RCA) EF 75% ([place], 3/20) now s/p 2nd state TEVAR on 5/5 for whom surgery was consulted for finding of acute pancreatitis with large peripancreatic/perigastric fluid collections on CTA abdomen on 5/8. Epilepsy team consulted after patient presenting with an episode of altered mental status, described by PA of facial twitching and blinking for 5 minutes that resolved after placed on Ativan 2 mg IV. After that patient has been lethargic progressively improving but still unresponsive however the patient is on precedex. No new seizure described by primary team.    Course now complicated with pancreatitis possibly due to adverse effect of VA    Event reported 5/14 did not correlate w/ seizure activity    PLAN:  ABC and stabilize vitals per primary team.   - decrease Divalproex ER 1000 mg BID to 750mg PM tonight 5/15  - Decrease again Divalproex ER 500mg BID starting tomorrow AM 5/16  - Increase Vimpat from 100mg TID to 200mg BID tomorrow AM 5/16  - continue vEEG  - F/U ammonia  - F/u lacosamide level    Ativan 2mg IVP for seizures > 2mins  Keep Mg>2 and K >4.   Management per primary team.     Please call us if any questions or neurological changes.

## 2023-05-15 NOTE — PROGRESS NOTE ADULT - SUBJECTIVE AND OBJECTIVE BOX
SUBJECTIVE: Patient seen and examined at bedside.    aspirin enteric coated 81 milliGRAM(s) Oral daily  phenylephrine    Infusion 0.1 MICROgram(s)/kG/Min IV Continuous <Continuous>    MEDICATIONS  (PRN):  acetaminophen     Tablet .. 650 milliGRAM(s) Oral every 6 hours PRN Mild Pain (1 - 3)  fentaNYL    Injectable 25 MICROGram(s) IV Push every 3 hours PRN for breakthrough pain      I&O's Detail    14 May 2023 07:01  -  15 May 2023 07:00  --------------------------------------------------------  IN:    Albumin 25%  -  50 mL: 50 mL    Dexmedetomidine: 84 mL    IV PiggyBack: 800 mL    Phenylephrine: 18.4 mL    Phenylephrine: 55.2 mL    Phenylephrine: 209.8 mL    Plasma: 336 mL    Platelets - Single Donor: 203 mL    PRBCs (Packed Red Blood Cells): 900 mL    Propofol: 201.6 mL    sodium chloride 0.9%: 240 mL    Vasopressin: 19.5 mL  Total IN: 3117.5 mL    OUT:    Indwelling Catheter - Urethral (mL): 935 mL  Total OUT: 935 mL    Total NET: 2182.5 mL      15 May 2023 07:01  -  15 May 2023 08:57  --------------------------------------------------------  IN:    Dexmedetomidine: 3.5 mL    Phenylephrine: 2.6 mL    Propofol: 8.4 mL    sodium chloride 0.9%: 10 mL  Total IN: 24.5 mL    OUT:    Indwelling Catheter - Urethral (mL): 85 mL  Total OUT: 85 mL    Total NET: -60.5 mL          T(C): 36.6 (05-15-23 @ 07:00), Max: 37.9 (05-15-23 @ 00:30)  HR: 88 (05-15-23 @ 08:00) (88 - 117)  BP: --  RR: 23 (05-15-23 @ 08:00) (17 - 27)  SpO2: 95% (05-15-23 @ 08:00) (93% - 98%)    GENERAL: NAD, Resting comfortably in bed, awake, opens eyes spontaneously  HEENT: NCAT, MMM, Normal conjunctiva, PERRL  RESP: Nonlabored breathing, No respiratory distress  CARD: Normal rate, Normal peripheral perfusion  GI: Soft, ND, NT, No guarding, No rebound tenderness  EXTREM: WWP, No edema, No gross deformity of extremities  SKIN: No rashes, no lesions  NEURO: AAOx3, No focal motor or sensory deficits  PSYCH: Affect and characteristics of appearance, verbalizations, and behaviors are appropriate    LABS:                        10.5   8.72  )-----------( 125      ( 15 May 2023 05:56 )             29.7     05-15    141  |  106  |  53<H>  ----------------------------<  96  3.8   |  22  |  2.06<H>    Ca    8.0<L>      15 May 2023 05:56  Phos  5.2     05-15  Mg     2.3     05-15    TPro  5.3<L>  /  Alb  2.7<L>  /  TBili  1.4<H>  /  DBili  x   /  AST  20  /  ALT  7<L>  /  AlkPhos  70  05-15    PT/INR - ( 15 May 2023 05:56 )   PT: 16.7 sec;   INR: 1.40          PTT - ( 15 May 2023 05:56 )  PTT:32.1 sec      RADIOLOGY & ADDITIONAL STUDIES:  CT Abdomen and Pelvis No Cont:   ACC: [id number] EXAM:  CT ABDOMEN AND PELVIS   ORDERED BY: MARIETTA HOFFMAN     PROCEDURE DATE:  05/12/2023          INTERPRETATION:  Initial report created on 5/13/2023 12:57:19 AM EDT  PROCEDURE INFORMATION:  Exam: CT Abdomen And Pelvis Without Contrast  Exam date and time: 5/12/2023 9:47 PM  Age: 53 years old  Clinical indication: Hemorrhagic shock    TECHNIQUE:  Imaging protocol: Computed tomography of the abdomen and pelvis without   contrast.  Please note that the chest part of this examination has a separate report   although images of chest, abdomen and pelvis are contiguous.  COMPARISON:  1.   CT ABDOMEN AND PELVIS 5/11/2023 11:03 PM and 5/8/2023  2.   CT CHEST 5/12/2023 9:47 PM    FINDINGS:  Limitations: Evaluation significantly limited due to lack of IV contrast.    LUNG BASES: Aortic valve replacement, anemia. Aneurysmal dilatation of   the descending thoracic aorta with visualized endovascular stent graft   repair. Descending thoracic aorta at the level of the pulmonary artery   measures 6.9 x  5.8 cm. There are bilateral moderate pleural effusions.   Bilateral lower lobe partial compression atelectasis.    LIVER: Increased attenuation of the liver as can be seen with iron   deposition or amiodarone exposure.  BILE DUCTS: Nointrahepatic or extrahepatic biliary dilatation.  GALLBLADDER: Sludge versus noncalcified stones versus vicarious excretion   of contrast from previous studies. No gallbladder distention or   gallbladder wall  thickening.  PANCREAS: Limited evaluation on noncontrast imaging. Interval worsening   of the thickening of the pancreatic body and tail with hyperdense   components suggesting hemorrhage. Additional cystic changes in the   pancreatic head are not well evaluated on this study. Underlying   pathology such as inflammation or neoplasm cannot be excluded based on   these non contrast images.  SPLEEN: No splenomegaly. Perisplenic fluid/ascites  has slightly   increased from prior study.  ADRENAL GLANDS: Adrenal glands are within normal limits.  KIDNEYS AND URETERS: Normal kidneys. No hydronephrosis, no hydroureter.   No obstructing calculus. Punctate non obstructing right mid pole calculus.    URINARY BLADDER: Urinary bladder decompressed with a Cantrell catheter   present.  REPRODUCTIVE: Prostate within normal limits.    BOWEL: Stomach, duodenum and small bowel are normal caliber. Moderate   stool burden in the colon with mild distention of the hepatic flexure to   6.7 cm. Decompressed descending colon. No acute bowel obstruction.   Appendix: Normal.    PERITONEUM: Worsening moderate to large amount of ascites in all 4   quadrants, with layering hemorrhagic products in the pelvis indicating   hemoperitoneum. No free air. Since 5/11/2023 new/enlarging left   retroperitoneal hematomadistal to the pancreatic tail measuring 11.2 x   8.6 x 20 cm.    VASCULATURE: Aortic stent graft seen extending from the descending   thoracic aorta up to the level of the aortic hiatus above the celiac   artery. A aortic dissection membrane is visualized throughout the   abdominal aorta extending up to the aortic bifurcation and likely into   the left common iliac artery. Abdominal aorta measures 3.9 cm at the   level of the origin of the SMA and 3.6 cm in the infrarenal aorta.    LYMPH NODES: No obvious adenopathy.  SOFT TISSUES: Small fluid collection in the right groin. Numerous   metallic artifacts in the right groin likely from intervention. Multiple   surgical clips seen in the right inguinal region. Mild diffuse anasarca.  BONES/JOINTS: No acute osseous abnormality or evidence of osseous   metastatic disease. Mild discogenic degenerative disease of the spine.      IMPRESSION:  Evaluation significantly limited due to lack of IV contrast.    - Enlarging and new left retroperitoneal hematoma in the left hemiabdomen   and worsening hematoma involving the pancreas. The hematoma measures up   to 20cm. An underlying pancreatic pathology cannot be excluded   (pancreatitis or other).    - Worsening hemoperitoneum.    - Aortic aneurysm and dissection extending into the left common iliac   artery with partially visualized endovascular stent graft ending above   the celiac axis.      Addendum created by Dr. Jefferson Martinez MD on 5/13/2023 1:12:52 AM EDT  THIS REPORT CONTAINS FINDINGS THAT MAY BE CRITICAL TO PATIENT CARE. The  findings were verbally communicated via telephone conference with LILIA Roca at 1:12 AM EDT on 5/13/2023. The findings were   acknowledged and  understood.    --- End of Report ---            RAMONA PATTEN MD; Attending Radiologist  This document has been electronically signed. May 13 2023 10:30PM (05-12-23 @ 23:15)

## 2023-05-15 NOTE — EEG REPORT - NS EEG TEXT BOX
Daily Updates (from 07:00 am until 07:00 am):  Day 2 5/14-5/15/2023:   Pertinent medications: Depakote 1 g BID, LCM increased to 100mg  TID  Background:  continuous, with predominantly alpha/theta frequencies.  Voltage:  Normal (20+ uV)  Organization:  Rudimentary  Posterior Dominant Rhythm:  <7 Hz symmetric, well-organized, and poorly-modulated  Sleep:  Symmetric, synchronous spindles and K complexes.  Focal abnormalities:  No persistent asymmetries of voltage or frequency.  Spontaneous Activity:  No epileptiform discharges  Choose an item. Choose an item.  Symmetry:  Bilaterally symmetric  Generalized Slowing:  Mild  Events:  2)	There was one event reported ~4:09 PM  that did not respond to epileptiform abnormalities.   Provocations:  1)	Hyperventilation: was not performed.  2)	Photic stimulation: was not performed.  Impression:   Abnormal continuous EEG  3)	Mild to moderate generalized slowing  4)	There was one event reported ~4:09 PM  that did not respond to epileptiform abnormalities.    Clinical Correlation:  Findings indicate non-specific mild diffuse or multifocal cerebral dysfunction.         Dary Flores MD  Attending Neurologist, [facility]     Daily Updates (from 07:00 am until 07:00 am):  Day 2 5/14-5/15/2023:   Pertinent medications: Depakote 1 g BID, LCM increased to 100mg  TID  Background:  continuous, with predominantly alpha/theta frequencies.  Voltage:  Normal (20+ uV)  Organization:  Rudimentary  Posterior Dominant Rhythm:  <7 Hz symmetric, well-organized, and poorly-modulated  Sleep:  Symmetric, synchronous spindles and K complexes.  Focal abnormalities:  No persistent asymmetries of voltage or frequency.  Spontaneous Activity:  No epileptiform discharges  Choose an item. Choose an item.  Symmetry:  Bilaterally symmetric  Generalized Slowing:  Mild  Events:  2)	There was one event reported ~4:09 PM  that were not associated with epileptiform abnormalities.   Provocations:  1)	Hyperventilation: was not performed.  2)	Photic stimulation: was not performed.  Impression:   Abnormal continuous EEG  3)	Mild to moderate generalized slowing  4)	There was one event reported ~4:09 PM that were not associated with epileptiform abnormalities.     Clinical Correlation:  Findings indicate non-specific mild diffuse or multifocal cerebral dysfunction.         Dary Flores MD  Attending Neurologist, [facility]

## 2023-05-15 NOTE — PROGRESS NOTE ADULT - ASSESSMENT
53year-old male with PMHx of HTN, Type A aortic dissection s/p Dacron grafts, AV resuspension in 2013, CAD s/p CABG x 1 SVG to RCA (5/2013 with Dr. Medina), seizure disorder (last episode on 7/4/22) S/P replacement of transverse aortic arch, second stage thoracic endovascular aortic repair, now s/p 2nd state TEVAR on 5/5 with course complicated by acute pancreatis and acute blood loss anemia. Surgery consulted on 05/08/2023 for pancreatitis that was improving. GS reconsulted on 05/12/2023 for acute onset of severe abdominal pain with repeat imaging concerning for active hemorrhage from pancreatic tail. Patient is s/p IR Angiogram (05/13/2023) negative for PSA or active bleed.    PLAN  - Reconsult General Surgery Team 1 (Dr. Tamez) for Pancreatitis Task Force evaluation  - Continue transfusion with goal Hgb > 8  - Follow-up CT scan for evaluation of hematoma change in size  - Surgery Team 4C will continue to follow  - Please page Team 4 at [phone] with any questions and/or clinical changes

## 2023-05-15 NOTE — PROGRESS NOTE ADULT - SUBJECTIVE AND OBJECTIVE BOX
Inteval history:    Pt received 2upRBCs o/n and has been having persistent anemia 2/2 to blood loss. Imaging suggestive of bleeding at the pancreas head likely due to pancreatitis possibly 2/2 to VA.    Unable to obtain ROS     MEDICATIONS  (STANDING):  aspirin enteric coated 81 milliGRAM(s) Oral daily  atorvastatin 20 milliGRAM(s) Oral at bedtime  chlorhexidine 0.12% Liquid 15 milliLiter(s) Oral Mucosa every 12 hours  chlorhexidine 2% Cloths 1 Application(s) Topical daily  dexMEDEtomidine Infusion 0.2 MICROgram(s)/kG/Hr (3.5 mL/Hr) IV Continuous <Continuous>  lacosamide IVPB 100 milliGRAM(s) IV Intermittent <User Schedule>  pantoprazole  Injectable 40 milliGRAM(s) IV Push daily  Parenteral Nutrition - Adult 1 Each (50 mL/Hr) TPN Continuous <Continuous>  phenylephrine    Infusion 0.1 MICROgram(s)/kG/Min (2.63 mL/Hr) IV Continuous <Continuous>  polyethylene glycol 3350 17 Gram(s) Oral daily  propofol Infusion 10 MICROgram(s)/kG/Min (4.2 mL/Hr) IV Continuous <Continuous>  senna 2 Tablet(s) Oral at bedtime  sodium chloride 0.9%. 1000 milliLiter(s) (10 mL/Hr) IV Continuous <Continuous>  valproate sodium  IVPB 750 milliGRAM(s) IV Intermittent every 12 hours    MEDICATIONS  (PRN):  acetaminophen     Tablet .. 650 milliGRAM(s) Oral every 6 hours PRN Mild Pain (1 - 3)  fentaNYL    Injectable 25 MICROGram(s) IV Push every 3 hours PRN for breakthrough pain      T(C): 36.5 (05-15-23 @ 09:00), Max: 37.9 (05-15-23 @ 00:30)  HR: 95 (05-15-23 @ 13:00) (86 - 109)  BP: --  RR: 24 (05-15-23 @ 13:00) (17 - 27)  SpO2: 95% (05-15-23 @ 13:00) (93% - 98%)  Wt(kg): --    General:  Constitutional: intubated sedated  Neck: supple, no lymphadenopathy  Extremities: b/l edema of UE  Skin: no rash or neurocutaneous signs     Cognitive:  Orientation, language, memory and knowledge screens intact.    Cranial Nerves:  Unable to obtain, pt sedated intubated      Investigations:  CBC Full  -  ( 15 May 2023 12:05 )  WBC Count : 9.98 K/uL  RBC Count : 3.49 M/uL  Hemoglobin : 10.5 g/dL  Hematocrit : 30.0 %  Platelet Count - Automated : 126 K/uL  Mean Cell Volume : 86.0 fl  Mean Cell Hemoglobin : 30.1 pg  Mean Cell Hemoglobin Concentration : 35.0 gm/dL  Auto Neutrophil # : x  Auto Lymphocyte # : x  Auto Monocyte # : x  Auto Eosinophil # : x  Auto Basophil # : x  Auto Neutrophil % : x  Auto Lymphocyte % : x  Auto Monocyte % : x  Auto Eosinophil % : x  Auto Basophil % : x    05-15    140  |  105  |  54<H>  ----------------------------<  91  3.4<L>   |  22  |  2.12<H>    Ca    8.6      15 May 2023 12:05  Phos  5.0     05-15  Mg     2.3     05-15    TPro  5.3<L>  /  Alb  2.6<L>  /  TBili  1.1  /  DBili  x   /  AST  19  /  ALT  7<L>  /  AlkPhos  72  05-15    LIVER FUNCTIONS - ( 15 May 2023 12:05 )  Alb: 2.6 g/dL / Pro: 5.3 g/dL / ALK PHOS: 72 U/L / ALT: 7 U/L / AST: 19 U/L / GGT: x           PT/INR - ( 15 May 2023 12:05 )   PT: 16.2 sec;   INR: 1.36          PTT - ( 15 May 2023 12:05 )  PTT:33.7 sec      EEG: Inteval history:    Pt received 2upRBCs o/n and has been having persistent anemia 2/2 to blood loss. Imaging suggestive of bleeding at the pancreas head. There is concern  pancreatitis could possibly 2/2 to [facility].    Unable to obtain ROS     MEDICATIONS  (STANDING):  aspirin enteric coated 81 milliGRAM(s) Oral daily  atorvastatin 20 milliGRAM(s) Oral at bedtime  chlorhexidine 0.12% Liquid 15 milliLiter(s) Oral Mucosa every 12 hours  chlorhexidine 2% Cloths 1 Application(s) Topical daily  dexMEDEtomidine Infusion 0.2 MICROgram(s)/kG/Hr (3.5 mL/Hr) IV Continuous <Continuous>  lacosamide IVPB 100 milliGRAM(s) IV Intermittent <User Schedule>  pantoprazole  Injectable 40 milliGRAM(s) IV Push daily  Parenteral Nutrition - Adult 1 Each (50 mL/Hr) TPN Continuous <Continuous>  phenylephrine    Infusion 0.1 MICROgram(s)/kG/Min (2.63 mL/Hr) IV Continuous <Continuous>  polyethylene glycol 3350 17 Gram(s) Oral daily  propofol Infusion 10 MICROgram(s)/kG/Min (4.2 mL/Hr) IV Continuous <Continuous>  senna 2 Tablet(s) Oral at bedtime  sodium chloride 0.9%. 1000 milliLiter(s) (10 mL/Hr) IV Continuous <Continuous>  valproate sodium  IVPB 750 milliGRAM(s) IV Intermittent every 12 hours    MEDICATIONS  (PRN):  acetaminophen     Tablet .. 650 milliGRAM(s) Oral every 6 hours PRN Mild Pain (1 - 3)  fentaNYL    Injectable 25 MICROGram(s) IV Push every 3 hours PRN for breakthrough pain      T(C): 36.5 (05-15-23 @ 09:00), Max: 37.9 (05-15-23 @ 00:30)  HR: 95 (05-15-23 @ 13:00) (86 - 109)  BP: --  RR: 24 (05-15-23 @ 13:00) (17 - 27)  SpO2: 95% (05-15-23 @ 13:00) (93% - 98%)  Wt(kg): --    General:  Constitutional: intubated sedated  Neck: supple, no lymphadenopathy  Extremities: b/l edema of UE  Skin: no rash or neurocutaneous signs     Cognitive:  Orientation, language, memory and knowledge screens intact.    Cranial Nerves:  Unable to obtain, pt sedated intubated      Investigations:  CBC Full  -  ( 15 May 2023 12:05 )  WBC Count : 9.98 K/uL  RBC Count : 3.49 M/uL  Hemoglobin : 10.5 g/dL  Hematocrit : 30.0 %  Platelet Count - Automated : 126 K/uL  Mean Cell Volume : 86.0 fl  Mean Cell Hemoglobin : 30.1 pg  Mean Cell Hemoglobin Concentration : 35.0 gm/dL  Auto Neutrophil # : x  Auto Lymphocyte # : x  Auto Monocyte # : x  Auto Eosinophil # : x  Auto Basophil # : x  Auto Neutrophil % : x  Auto Lymphocyte % : x  Auto Monocyte % : x  Auto Eosinophil % : x  Auto Basophil % : x    05-15    140  |  105  |  54<H>  ----------------------------<  91  3.4<L>   |  22  |  2.12<H>    Ca    8.6      15 May 2023 12:05  Phos  5.0     05-15  Mg     2.3     05-15    TPro  5.3<L>  /  Alb  2.6<L>  /  TBili  1.1  /  DBili  x   /  AST  19  /  ALT  7<L>  /  AlkPhos  72  05-15    LIVER FUNCTIONS - ( 15 May 2023 12:05 )  Alb: 2.6 g/dL / Pro: 5.3 g/dL / ALK PHOS: 72 U/L / ALT: 7 U/L / AST: 19 U/L / GGT: x           PT/INR - ( 15 May 2023 12:05 )   PT: 16.2 sec;   INR: 1.36          PTT - ( 15 May 2023 12:05 )  PTT:33.7 sec      EEG:

## 2023-05-15 NOTE — PROGRESS NOTE ADULT - SUBJECTIVE AND OBJECTIVE BOX
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 Recent admission 3/20-4/14 during which patient underwent replacement of transverse aortic arch, second stage thoracic endovascular aortic repair, aorto-axillary bypass, AV replacement (bio 23mm), and CABG x 1 (SVG-RCA) EF 75% (Ignacio, 3/20). Post op cb lactic acidosis, severe cardiogenic and vasogenic shock, TREY requiring CVVHD and temporary dialysis requirement. Recent admission 4/27-4/30 for seizure episode, were his AEDS were adjusted. Presented to [place] ED 5/4 for epigastric pain. CT exam which showed known endoleak for which pt was tx to [facility]. Patient underwent TEVAR with Dr. Pierre 5/5, LD placed prior by NSGY. Arrived intubated on propofol. 5/6 extubated. 1 unit pRBC, CTH performed for decreased LE mvmt. Improved later in day. 5/7 LD clamped and patient ambulated, plan for dc. 5/8 US showing groin seroma and hematoma. CT scan showing necrotizing pancreatitis. Zosyn started and Gen surg consulted. Recommended IR and GI consults and further evaluation with imaging. Gi believes 2/2 insults from surgeries back in March and recommended outpatient fu. 5/9 new RIJ and PA catheter placed, R pigtail placed cb pneumothorax. 5/11 gen surg signed off. Repeat imaging showing concern for possible malignancy in pancreatic tail. Radiology attending called during day to say findings likely 2/2 pancreatitis and recommended outpatient contrast study. Around 7pm patient had questionable seizure-like episode. Given 2mg ativan. Labs sig for Hb drop to ~6. TREY with oliguria. Stat CT showing [id number] hematoma, mod-large ascites with layering in pelvis. Given 4 units pRBC and sent to IR for possible intervention. 5/13 returned from Ir intubated, no arterial bleeder identified therefore no intervention performed. Hb ~8, gen surg recommending additional transfusion. 5/13 Total transfusion 7 pRBC, 2 cryo, 2 FFP, 1 plt. Was given additional blood overnight (1 pRBC). Gen surg on board. 5/14 1 unit pRBC during day for episode of hypotension. 1 episode of seizure resolved with propofol push. Vimpat dose adjusted per neurology. Given 2 FFP and 1 plt. 5/15 discussed with neurology possibilty of depakote induced pancreatitis-dose being adjusted.       FAMILY HISTORY:  No pertinent family history in first degree relatives  PAST MEDICAL & SURGICAL HISTORY:  HTN (hypertension)  Aortic dissection  CAD (coronary artery disease)  Seizure disorder  S/P aortic bifurcation bypass graft  S/P CABG x 1        Patient is a 53y old  Male who presents with a chief complaint of endoleak, abdominal pain.      14 system review was unremarkable    Vital signs, hemodynamic and respiratory parameters were reviewed from the bedside nursing flowsheet.  ICU Vital Signs Last 24 Hrs  T(C): 37.2 (15 May 2023 17:00), Max: 37.9 (15 May 2023 00:30)  T(F): 99 (15 May 2023 17:00), Max: 100.3 (15 May 2023 00:30)  HR: 101 (15 May 2023 20:00) (86 - 109)  BP: --  BP(mean): --  ABP: 142/75 (15 May 2023 20:00) (97/53 - 150/80)  ABP(mean): 101 (15 May 2023 20:00) (69 - 109)  RR: 26 (15 May 2023 20:00) (17 - 30)  SpO2: 96% (15 May 2023 20:00) (93% - 98%)    O2 Parameters below as of 15 May 2023 20:00  Patient On (Oxygen Delivery Method): ventilator    O2 Concentration (%): 50      Adult Advanced Hemodynamics Last 24 Hrs  CVP(mm Hg): 8 (15 May 2023 19:00) (5 - 14)  CVP(cm H2O): --  CO: --  CI: --  PA: --  PA(mean): --  PCWP: --  SVR: --  SVRI: --  PVR: --  PVRI: --, ABG - ( 15 May 2023 18:29 )  pH, Arterial: 7.45  pH, Blood: x     /  pCO2: 32    /  pO2: 78    / HCO3: 22    / Base Excess: -1.0  /  SaO2: 97.9              Mode: AC/ CMV (Assist Control/ Continuous Mandatory Ventilation)  RR (machine): 12  TV (machine): 500  FiO2: 50  PEEP: 5  ITime: 1  MAP: 13  PIP: 20    Intake and output was reviewed and the fluid balance was calculated  Daily     Daily   I&O's Summary    14 May 2023 07:01  -  15 May 2023 07:00  --------------------------------------------------------  IN: 3117.5 mL / OUT: 935 mL / NET: 2182.5 mL    15 May 2023 07:01  -  15 May 2023 20:32  --------------------------------------------------------  IN: 1179 mL / OUT: 843 mL / NET: 336 mL        All lines and drain sites were assessed  Glycemic trend was reviewed    Neuro: sedated  HEENT: ett  Heart: s1 s2  Lungs: clear bl  Abdomen: mildly firm, nt, nd  Extremities: wwp, groin seroma x 2    Lines:  R axillary arterial 5/13  RIJ 5/15      labs  CBC Full  -  ( 15 May 2023 18:26 )  WBC Count : 8.90 K/uL  RBC Count : 3.51 M/uL  Hemoglobin : 10.7 g/dL  Hematocrit : 30.2 %  Platelet Count - Automated : 144 K/uL  Mean Cell Volume : 86.0 fl  Mean Cell Hemoglobin : 30.5 pg  Mean Cell Hemoglobin Concentration : 35.4 gm/dL  Auto Neutrophil # : x  Auto Lymphocyte # : x  Auto Monocyte # : x  Auto Eosinophil # : x  Auto Basophil # : x  Auto Neutrophil % : x  Auto Lymphocyte % : x  Auto Monocyte % : x  Auto Eosinophil % : x  Auto Basophil % : x    05-15    140  |  104  |  54<H>  ----------------------------<  91  3.2<L>   |  22  |  2.03<H>    Ca    8.7      15 May 2023 18:26  Phos  4.9     05-15  Mg     2.2     05-15    TPro  6.1  /  Alb  2.7<L>  /  TBili  1.1  /  DBili  x   /  AST  20  /  ALT  8<L>  /  AlkPhos  81  05-15    PT/INR - ( 15 May 2023 18:26 )   PT: 15.3 sec;   INR: 1.28          PTT - ( 15 May 2023 18:26 )  PTT:32.7 sec  The current medications were reviewed   MEDICATIONS  (STANDING):  aspirin enteric coated 81 milliGRAM(s) Oral daily  atorvastatin 20 milliGRAM(s) Oral at bedtime  chlorhexidine 0.12% Liquid 15 milliLiter(s) Oral Mucosa every 12 hours  chlorhexidine 2% Cloths 1 Application(s) Topical daily  lacosamide IVPB 100 milliGRAM(s) IV Intermittent <User Schedule>  pantoprazole  Injectable 40 milliGRAM(s) IV Push daily  Parenteral Nutrition - Adult 1 Each (50 mL/Hr) TPN Continuous <Continuous>  polyethylene glycol 3350 17 Gram(s) Oral daily  potassium chloride  20 mEq/100 mL IVPB 20 milliEquivalent(s) IV Intermittent every 1 hour  propofol Infusion 10 MICROgram(s)/kG/Min (4.2 mL/Hr) IV Continuous <Continuous>  senna 2 Tablet(s) Oral at bedtime  sodium chloride 0.9%. 1000 milliLiter(s) (10 mL/Hr) IV Continuous <Continuous>  valproate sodium  IVPB 750 milliGRAM(s) IV Intermittent every 12 hours    MEDICATIONS  (PRN):  acetaminophen     Tablet .. 650 milliGRAM(s) Oral every 6 hours PRN Mild Pain (1 - 3)  fentaNYL    Injectable 25 MICROGram(s) IV Push every 3 hours PRN for breakthrough pain      Assessment/Plan:  53yr old male with PMH HTN, type A dissection sp Dacron grafts and AV resuspension (2013), CAD sp CABG x 1 (Adam, 5/2013, SVG-RCA), seizures, admitted for surgical mgmt of progression of aneurysmal disease. Recent admission 3/20-4/14 during which patient underwent replacement of transverse aortic arch, second stage thoracic endovascular aortic repair, aorto-axillary bypass, AV replacement (bio 23mm), and CABG x 1 (SVG-RCA) EF 75% (Ignacio, 3/20). Post op cb lactic acidosis, severe cardiogenic and vasogenic shock, TREY requiring CVVHD and temporary dialysis requirement. Recent admission 4/27-4/30 for seizure episode, were his AEDS were adjusted. Presented to [place] ED 5/4 for epigastric pain. CT exam which showed known endoleak for which pt was tx to [facility]. Patient underwent TEVAR with Dr. Pierre 5/5, LD placed prior by NSGY. Arrived intubated on propofol. Given 2 units intraop, 1L IVF, 100cc urine output.    POD10 TEVAR (Ignacio, 5/5)  Pancreatitis cb large hematoma  Sp IR attempt at embolization 5/13, no arterial bleed identified  Hemorrhagic shock-serial cbc  Repeat CT scan if Hb drops  Anti-emetics prn  Acute respiratory failure requiring mechanical ventilation-keep for now  Thrombocytopenia-monitor  TREY-monitor urine output-likely ischemic insult from hemorrhage  Aspirin  Statin  AEDS  Appreciate neuro recs  vEEG-no events, continue while AEDs being adjusted  Replete lytes prn  GI/DVT PPX  Close hemodynamic, ventilatory and drain monitoring and management per post op routine  Monitor for arrhythmias and monitor parameters for organ perfusion  Beta blockade not administered due to hemodynamic instability and bradycardia  Monitor neurologic status  Head of the bed should remain elevated to 45 deg   Chest PT and IS will be encouraged  Monitor adequacy of oxygenation and ventilation and attempt to wean oxygen  Antibiotic regimen will be tailored to the clinical, laboratory and microbiologic data  Nutritional goals will be met using po eventually, ensure adequate caloric intake and monitor the same  Stress ulcer and VTE prophylaxis will be achieved    Glycemic control is satisfactory  Electrolytes have been repleted as necessary and wound care has been carried out   Pain control has been achieved.   Aggressive physical therapy and early mobility and ambulation goals will be met   The family was updated about the course and plan.    CRITICAL CARE TIME personally provided by me  in evaluation and management, reassessments, review and interpretation of labs and x-rays, ventilator and hemodynamic management, formulating a plan and coordinating care: ___30____ MIN.  Time does not include procedural time.    CTICU ATTENDING     					  Alexx Brooks MD

## 2023-05-15 NOTE — PROGRESS NOTE ADULT - ASSESSMENT
This is a 52yo Male pt with PMH HTN, type A aortic dissection s/p Dacron grafts, AV resuspension in 2013, CAD s/p CABG x 1 SVG to RCA (5/2013 with Dr. Medina), seizure disorder (last episode on 7/4/22) S/P replacement of transverse aortic arch, second stage thoracic endovascular aortic repair, aorto-axillary bypass, AV replacement (bio 23mm), in APr 2023 and CABG x 1 (SVG-RCA) EF 75% (Ignacio, 3/20) now s/p 2nd state TEVAR on 5/5 for whom surgery was consulted for finding of acute pancreatitis with large peripancreatic/perigastric fluid collections on CTA abdomen 5/8 then acute hemorrhage starting 5/12/23 requiring 11 U pRBC over last 48 hours but with negative mesenteric angiogram 5/13/23 for whom hepatobiliary surgery was reconsulted for hemorrhagic pancreatitis.    - Would obtain Triple Phase CT Abd/Pelv if H&H drops again. Agree with repeating labs including CBC & INR now  - Consider FFP if INR remains elevated.   - Hepatopancreatobiliary (Surgery 1C) following.

## 2023-05-15 NOTE — CHART NOTE - NSCHARTNOTEFT_GEN_A_CORE
Admitting Diagnosis:   Patient is a 53y old  Male who presents with a chief complaint of endoleak, abdominal pain (15 May 2023 13:25)      PAST MEDICAL & SURGICAL HISTORY:  HTN (hypertension)      Aortic dissection      CAD (coronary artery disease)      Seizure disorder      S/P aortic bifurcation bypass graft      S/P CABG x 1          Current Nutrition Order:  TPN: 150g Dex, 70g AA, 790Kcal, 70g protein, GIR , g/kg IBW protein    PO Intake: Good (%) [   ]  Fair (50-75%) [   ] Poor (<25%) [   ]-- NA NPO/TPN     GI Issues:   RN reports small BM yesterday and o/n    Pain: none per flow sheets     Skin Integrity:  Sandro 10, No pressure ulcer-Skin Tear Noted   3+Edema (left arm; right arm; left leg; right leg), Nonpitting Edema (Scrotum)     Labs:   05-15    140  |  105  |  54<H>  ----------------------------<  91  3.4<L>   |  22  |  2.12<H>    Ca    8.6      15 May 2023 12:05  Phos  5.0     05-15  Mg     2.3     05-15    TPro  5.3<L>  /  Alb  2.6<L>  /  TBili  1.1  /  DBili  x   /  AST  19  /  ALT  7<L>  /  AlkPhos  72  05-15    CAPILLARY BLOOD GLUCOSE          Medications:  MEDICATIONS  (STANDING):  aspirin enteric coated 81 milliGRAM(s) Oral daily  atorvastatin 20 milliGRAM(s) Oral at bedtime  chlorhexidine 0.12% Liquid 15 milliLiter(s) Oral Mucosa every 12 hours  chlorhexidine 2% Cloths 1 Application(s) Topical daily  dexMEDEtomidine Infusion 0.2 MICROgram(s)/kG/Hr (3.5 mL/Hr) IV Continuous <Continuous>  lacosamide IVPB 100 milliGRAM(s) IV Intermittent <User Schedule>  pantoprazole  Injectable 40 milliGRAM(s) IV Push daily  Parenteral Nutrition - Adult 1 Each (50 mL/Hr) TPN Continuous <Continuous>  phenylephrine    Infusion 0.1 MICROgram(s)/kG/Min (2.63 mL/Hr) IV Continuous <Continuous>  polyethylene glycol 3350 17 Gram(s) Oral daily  propofol Infusion 10 MICROgram(s)/kG/Min (4.2 mL/Hr) IV Continuous <Continuous>  senna 2 Tablet(s) Oral at bedtime  sodium chloride 0.9%. 1000 milliLiter(s) (10 mL/Hr) IV Continuous <Continuous>  valproate sodium  IVPB 750 milliGRAM(s) IV Intermittent every 12 hours    MEDICATIONS  (PRN):  acetaminophen     Tablet .. 650 milliGRAM(s) Oral every 6 hours PRN Mild Pain (1 - 3)  fentaNYL    Injectable 25 MICROGram(s) IV Push every 3 hours PRN for breakthrough pain      6'0''  pounds+-10%   Wt 154 pounds BMI 20.9 %IBW87    Weight Change: Based on most recent EMR wt     Nutrition Focused Physical Exam: Completed [   ]  Not Pertinent [   ]  Muscle Wasting- Temporal [   ]  Clavicle/Pectoral [   ]  Shoulder/Deltoid [   ]  Scapula [   ]  Interosseous [   ]  Quadriceps [   ]  Gastrocnemius [   ]  Fat Wasting- Orbital [   ]  Buccal [   ]  Triceps [   ]  Rib [   ]  Suspect [PCM] 2/2 to physical assessment, [poor intake], and [wt loss]; please see malnutrition chart note.    Estimated energy needs:   current body wt used for energy calculations as pt falls within % IBW  adjust for age, post op needs, pancreatitis, ICU level of care, Vent     Subjective: 53 YO M, HTN, type A aortic dissection s/p Dacron grafts, AV resuspension 2013, CAD s/p CABG x1 SVG to RCA (5/2013), seizure disorder (last episode on 7/4/22) who was admitted for surgical mgmt of progression of aneurysmal disease. Recent admission 3/20-4/14: underwent replacement of transverse aortic arch, second stage thoracic endovascular aortic repair, aorto-axillary bypass, AV replacement (bio 23mm), and CABG x1 (SVG-RCA) EF 75% (Ignacio, 3/20). Post op cb lactic acidosis, severe cardiogenic and vasogenic shock, TREY requiring CVVHD and temporary dialysis requirement. Pt presented to [facility] ED 4/27 for syncope vs seizure episode at home, falling onto back of head. Nuerological workup proformed during that visit revealed a negative EEG, but given clinical picture of seizures, pt started on vimpat and depakote. Imaging preformed during that admission did no require acute surgical intervention on endoleak. Pt presented to [facility] ED c/o worsening epigastric pain. CTAP revealed continued endoleak. Sent to [facility] for further evaluation. S/p Second stage thoracic endovascular aortic repair 5/5. Course c/b pleural effusions, S/P Right pigtail insertion for pneumothorax 5/9. Also c/b acute pancreatitis with large peripancreatic/perigastric fluid collections on CTA abdomen on 5/8 then acute hemorrhage starting 5/12/23 requiring 11 U pRBC over last 48 hours but with negative mesenteric angiogram 5/13/23 for whom hepatobiliary surgery was reconsulted for hemorrhagic pancreatitis.    Pt seen this AM on 9EAST. RN at bedside. Pt vented: CMV mode. MAP 98. Remains ordered for dexMEDEtomidine Infusion, phenylephrine and propofol (Noted to be held per most recent flow sheets, prior rate 8.4ml provides ~222kcal). Seen with NGT to LWS, appeared to have green output noted. Pt NPO with no nutriton source during time of RD visit. Of note since RD visit PN order placed 5/15. Noted use of PN 5/9-5/10 (+Lipid use 5/9). RN reports small BM yesterday and o/n.  Labs: Low HH, ALT SGPT 7, Bili WDL (elevated prior), Na K Mg WDL, Phos 5.2, BUN/Cr 53/2.06, lipase 82, TG 29 5/10.   Please see below for nutritions recommendations.    Previous Nutrition Diagnosis: Inadeqate Energy Intake RT intake<EER AEB clears  Active [  X ]  Resolved [   ]  GOAL: Pt will meet at least 75% of nutrient needs via feasible route    Recommendations:    Education: NA.    Risk Level: High [X  ] Moderate [   ] Low [   ] Admitting Diagnosis:   Patient is a 53y old  Male who presents with a chief complaint of endoleak, abdominal pain (15 May 2023 13:25)      PAST MEDICAL & SURGICAL HISTORY:  HTN (hypertension)      Aortic dissection      CAD (coronary artery disease)      Seizure disorder      S/P aortic bifurcation bypass graft      S/P CABG x 1          Current Nutrition Order:  TPN: 150g Dex, 70g AA, 790Kcal, 70g protein, 1.5 GIR (based on 70kg)     PO Intake: Good (%) [   ]  Fair (50-75%) [   ] Poor (<25%) [   ]-- NA NPO/TPN     GI Issues:   RN reports small BM yesterday and o/n    Pain: none per flow sheets     Skin Integrity:  Sandro 10, No pressure ulcer-Skin Tear Noted   3+Edema (left arm; right arm; left leg; right leg), Nonpitting Edema (Scrotum)     Labs:   05-15    140  |  105  |  54<H>  ----------------------------<  91  3.4<L>   |  22  |  2.12<H>    Ca    8.6      15 May 2023 12:05  Phos  5.0     05-15  Mg     2.3     05-15    TPro  5.3<L>  /  Alb  2.6<L>  /  TBili  1.1  /  DBili  x   /  AST  19  /  ALT  7<L>  /  AlkPhos  72  05-15    CAPILLARY BLOOD GLUCOSE          Medications:  MEDICATIONS  (STANDING):  aspirin enteric coated 81 milliGRAM(s) Oral daily  atorvastatin 20 milliGRAM(s) Oral at bedtime  chlorhexidine 0.12% Liquid 15 milliLiter(s) Oral Mucosa every 12 hours  chlorhexidine 2% Cloths 1 Application(s) Topical daily  dexMEDEtomidine Infusion 0.2 MICROgram(s)/kG/Hr (3.5 mL/Hr) IV Continuous <Continuous>  lacosamide IVPB 100 milliGRAM(s) IV Intermittent <User Schedule>  pantoprazole  Injectable 40 milliGRAM(s) IV Push daily  Parenteral Nutrition - Adult 1 Each (50 mL/Hr) TPN Continuous <Continuous>  phenylephrine    Infusion 0.1 MICROgram(s)/kG/Min (2.63 mL/Hr) IV Continuous <Continuous>  polyethylene glycol 3350 17 Gram(s) Oral daily  propofol Infusion 10 MICROgram(s)/kG/Min (4.2 mL/Hr) IV Continuous <Continuous>  senna 2 Tablet(s) Oral at bedtime  sodium chloride 0.9%. 1000 milliLiter(s) (10 mL/Hr) IV Continuous <Continuous>  valproate sodium  IVPB 750 milliGRAM(s) IV Intermittent every 12 hours    MEDICATIONS  (PRN):  acetaminophen     Tablet .. 650 milliGRAM(s) Oral every 6 hours PRN Mild Pain (1 - 3)  fentaNYL    Injectable 25 MICROGram(s) IV Push every 3 hours PRN for breakthrough pain      6'0''  pounds+-10%   Wt 154 pounds BMI 20.9 %IBW87    Weight Change: Based on most recent EMR wt     Estimated energy needs:   current body wt used for energy calculations as pt falls within % IBW  adjust for age, post op needs, pancreatitis, ICU level of care, Vent  25-30kcal/k-2094kcal/day   1.4-1.6gm/k-112gm prot/day     Subjective: 51 YO M, HTN, type A aortic dissection s/p Dacron grafts, AV resuspension , CAD s/p CABG x1 SVG to RCA (2013), seizure disorder (last episode on 22) who was admitted for surgical mgmt of progression of aneurysmal disease. Recent admission 3/20-: underwent replacement of transverse aortic arch, second stage thoracic endovascular aortic repair, aorto-axillary bypass, AV replacement (bio 23mm), and CABG x1 (SVG-RCA) EF 75% (Ignacio, 3/20). Post op cb lactic acidosis, severe cardiogenic and vasogenic shock, TREY requiring CVVHD and temporary dialysis requirement. Pt presented to [facility] ED  for syncope vs seizure episode at home, falling onto back of head. Nuerological workup proformed during that visit revealed a negative EEG, but given clinical picture of seizures, pt started on vimpat and depakote. Imaging preformed during that admission did no require acute surgical intervention on endoleak. Pt presented to [facility] ED c/o worsening epigastric pain. CTAP revealed continued endoleak. Sent to [facility] for further evaluation. S/p Second stage thoracic endovascular aortic repair . Course c/b pleural effusions, S/P Right pigtail insertion for pneumothorax . Also c/b acute pancreatitis with large peripancreatic/perigastric fluid collections on CTA abdomen on  then acute hemorrhage starting 23 requiring 11 U pRBC over last 48 hours but with negative mesenteric angiogram 23 for whom hepatobiliary surgery was reconsulted for hemorrhagic pancreatitis.    Pt seen this AM on 9EAST. RN at bedside. Pt vented: CMV mode. MAP 98. Remains ordered for dexMEDEtomidine Infusion, phenylephrine and propofol (Noted to be held per most recent flow sheets, prior rate 8.4ml provides ~222kcal). Seen with NGT to LWS, appeared to have green output noted. Pt NPO with no nutrition source during time of RD visit. Of note since RD visit PN order placed 5/15. Noted use of PN -5/10 (+Lipid use ). RN reports small BM yesterday and o/n.  Labs: Low HH, ALT SGPT 7, Bili WDL (elevated prior), Na K Mg WDL, Phos 5.2, BUN/Cr 53/2.06, lipase 82, TG 29 5/10, Amylase 101.   Please see below for nutritions recommendations. Recs made with team.     Previous Nutrition Diagnosis: Inadequate Energy Intake RT intake<EER AEB clears  New PES: Increased nutrients RT increased demands AEB Vent   GOAL: Pt will meet at least 75% of nutrient needs via feasible route    Recommendations:  1. Should PN consider the following:   >> TPN: 250Dex, 98g AA, 50g 20% Lipids Every Other Day to provide in total 1742Kcal, 98g protein, 2.4 GIR (Based on 70kg).  >> Will adjust Lipid Use Pending Propofol use / Pending LFTs, Lipase, Amylase.   >> Recommend Checking TG weekly (please obtain new TG as last read for 5/10). Check Mg, Phos, K daily and POC BG Q6hrs. Replete Lytes PRN. Trend daily weights. Fluids and lytes per MD discretion.  >> Start at 150g Dex on Day 1, 200g Dex on Day 2, and advance to goal of g Dex on Day 3.   2. Pain/GI per team.  3. Labs: monitor BMP, CBC, glucose, lytes, trend renal indices, LFTs, POCT, lipids/TG, lactate, Amylase and Lipase; MAP.  4. RD to remain available for additional nutrition interventions as needed.     Education: NA.    Risk Level: High [X  ] Moderate [   ] Low [   ]

## 2023-05-15 NOTE — PROGRESS NOTE ADULT - SUBJECTIVE AND OBJECTIVE BOX
Pt seen and examined at bedside.    Intubated/sedated. Unable to obtain ROS    S/p TEVAR with Dr. Pierre 5/5, LD placed prior by NSGY with 5/8 imaging noting diffuse pancreatic enlargement and heterogenous attenuation predominantly in body and tail, with multiple intrapancreatic and peripancreatic fluid collections with well-defined enhancing wall, new since November 30, 2022, not significantly changed since May 5, 2023 on CTA abdomen in the setting of uptrending leukocytosis.     Course c/b by  abdominal hematoma noted on 5/13 imaging after seizure activity prompted CT scan and acute hemoglobin drop from 9.4 to 6.6.    Allergies    No Known Allergies    Intolerances        MEDICATIONS:  MEDICATIONS  (STANDING):  aspirin enteric coated 81 milliGRAM(s) Oral daily  atorvastatin 20 milliGRAM(s) Oral at bedtime  chlorhexidine 0.12% Liquid 15 milliLiter(s) Oral Mucosa every 12 hours  chlorhexidine 2% Cloths 1 Application(s) Topical daily  dexMEDEtomidine Infusion 0.2 MICROgram(s)/kG/Hr (3.5 mL/Hr) IV Continuous <Continuous>  lacosamide IVPB 100 milliGRAM(s) IV Intermittent <User Schedule>  pantoprazole  Injectable 40 milliGRAM(s) IV Push daily  Parenteral Nutrition - Adult 1 Each (50 mL/Hr) TPN Continuous <Continuous>  phenylephrine    Infusion 0.1 MICROgram(s)/kG/Min (2.63 mL/Hr) IV Continuous <Continuous>  polyethylene glycol 3350 17 Gram(s) Oral daily  propofol Infusion 10 MICROgram(s)/kG/Min (4.2 mL/Hr) IV Continuous <Continuous>  senna 2 Tablet(s) Oral at bedtime  sodium chloride 0.9%. 1000 milliLiter(s) (10 mL/Hr) IV Continuous <Continuous>  valproate sodium  IVPB 750 milliGRAM(s) IV Intermittent every 12 hours    MEDICATIONS  (PRN):  acetaminophen     Tablet .. 650 milliGRAM(s) Oral every 6 hours PRN Mild Pain (1 - 3)  fentaNYL    Injectable 25 MICROGram(s) IV Push every 3 hours PRN for breakthrough pain      Vital Signs Last 24 Hrs  T(C): 36.5 (15 May 2023 09:00), Max: 37.9 (15 May 2023 00:30)  T(F): 97.7 (15 May 2023 09:00), Max: 100.3 (15 May 2023 00:30)  HR: 89 (15 May 2023 12:00) (86 - 109)  BP: --  BP(mean): --  RR: 23 (15 May 2023 12:00) (17 - 27)  SpO2: 95% (15 May 2023 12:00) (93% - 98%)    Parameters below as of 15 May 2023 12:00  Patient On (Oxygen Delivery Method): ventilator    O2 Concentration (%): 50    05-14 @ 07:01  -  05-15 @ 07:00  --------------------------------------------------------  IN: 3117.5 mL / OUT: 935 mL / NET: 2182.5 mL    05-15 @ 07:01  -  05-15 @ 12:25  --------------------------------------------------------  IN: 79 mL / OUT: 485 mL / NET: -406 mL        PHYSICAL EXAM:    General: Intubated/sedated  HEENT: MMM, conjunctiva and sclera clear  Cardiac: Regular rate on tele  Pulm: mechanical vent  Gastrointestinal: Soft mildly -distended.  Skin: Warm and dry. No obvious rash    LABS:  CBC Full  -  ( 15 May 2023 05:56 )  WBC Count : 8.72 K/uL  RBC Count : 3.47 M/uL  Hemoglobin : 10.5 g/dL  Hematocrit : 29.7 %  Platelet Count - Automated : 125 K/uL  Mean Cell Volume : 85.6 fl  Mean Cell Hemoglobin : 30.3 pg  Mean Cell Hemoglobin Concentration : 35.4 gm/dL  Auto Neutrophil # : x  Auto Lymphocyte # : x  Auto Monocyte # : x  Auto Eosinophil # : x  Auto Basophil # : x  Auto Neutrophil % : x  Auto Lymphocyte % : x  Auto Monocyte % : x  Auto Eosinophil % : x  Auto Basophil % : x    05-15    141  |  106  |  53<H>  ----------------------------<  96  3.8   |  22  |  2.06<H>    Ca    8.0<L>      15 May 2023 05:56  Phos  5.2     05-15  Mg     2.3     05-15    TPro  5.3<L>  /  Alb  2.7<L>  /  TBili  1.4<H>  /  DBili  x   /  AST  20  /  ALT  7<L>  /  AlkPhos  70  05-15    PT/INR - ( 15 May 2023 05:56 )   PT: 16.7 sec;   INR: 1.40          PTT - ( 15 May 2023 05:56 )  PTT:32.1 sec    IMAGING REVIEWED

## 2023-05-15 NOTE — PROGRESS NOTE ADULT - SUBJECTIVE AND OBJECTIVE BOX
IDENTIFICATION: This is a 52yo Male pt with PMH HTN, type A aortic dissection s/p Dacron grafts, AV resuspension in 2013, CAD s/p CABG x 1 SVG to RCA (5/2013 with Dr. Medina), seizure disorder (last episode on 7/4/22) S/P replacement of transverse aortic arch, second stage thoracic endovascular aortic repair, aorto-axillary bypass, AV replacement (bio 23mm), in APr 2023 and CABG x 1 (SVG-RCA) EF 75% (Ignacio, 3/20) now s/p 2nd state TEVAR on 5/5 for whom surgery was consulted for finding of acute pancreatitis with large peripancreatic/perigastric fluid collections on CTA abdomen 5/8 then acute hemorrhage starting 5/12/23 requiring 11 U pRBC over last 48 hours but with negative mesenteric angiogram 5/13/23 for whom hepatobiliary surgery was reconsulted for hemorrhagic pancreatitis.    EVENTS:   - Last transfusion overnight ~ 0230  - Remains intubated/sedated    SUBJECTIVE:   - Sedated    MEDICATIONS  (STANDING):  aspirin enteric coated 81 milliGRAM(s) Oral daily  atorvastatin 20 milliGRAM(s) Oral at bedtime  chlorhexidine 0.12% Liquid 15 milliLiter(s) Oral Mucosa every 12 hours  chlorhexidine 2% Cloths 1 Application(s) Topical daily  dexMEDEtomidine Infusion 0.2 MICROgram(s)/kG/Hr (3.5 mL/Hr) IV Continuous <Continuous>  lacosamide IVPB 100 milliGRAM(s) IV Intermittent <User Schedule>  pantoprazole  Injectable 40 milliGRAM(s) IV Push daily  Parenteral Nutrition - Adult 1 Each (50 mL/Hr) TPN Continuous <Continuous>  phenylephrine    Infusion 0.1 MICROgram(s)/kG/Min (2.63 mL/Hr) IV Continuous <Continuous>  polyethylene glycol 3350 17 Gram(s) Oral daily  propofol Infusion 10 MICROgram(s)/kG/Min (4.2 mL/Hr) IV Continuous <Continuous>  senna 2 Tablet(s) Oral at bedtime  sodium chloride 0.9%. 1000 milliLiter(s) (10 mL/Hr) IV Continuous <Continuous>  valproate sodium  IVPB 750 milliGRAM(s) IV Intermittent every 12 hours    MEDICATIONS  (PRN):  acetaminophen     Tablet .. 650 milliGRAM(s) Oral every 6 hours PRN Mild Pain (1 - 3)  fentaNYL    Injectable 25 MICROGram(s) IV Push every 3 hours PRN for breakthrough pain      Vital Signs Last 24 Hrs  T(C): 36.5 (15 May 2023 09:00), Max: 37.9 (15 May 2023 00:30)  T(F): 97.7 (15 May 2023 09:00), Max: 100.3 (15 May 2023 00:30)  HR: 89 (15 May 2023 12:00) (86 - 109)  BP: --  BP(mean): --  RR: 23 (15 May 2023 12:00) (17 - 27)  SpO2: 95% (15 May 2023 12:00) (93% - 98%)    Parameters below as of 15 May 2023 12:00  Patient On (Oxygen Delivery Method): ventilator    O2 Concentration (%): 50    Neurologic: Sedated  CV: Normal rate, regular rhythm  Pulm: Breathing comfortably on MV with equal chest rise.  Abd: Mildly distended; mild reaction to palpation despite sedation  : No Cantrell  Skin: No rashes  Extremities: No edema.  Psychiatric: Sedated    I&O's Detail    14 May 2023 07:01  -  15 May 2023 07:00  --------------------------------------------------------  IN:    Albumin 25%  -  50 mL: 50 mL    Dexmedetomidine: 84 mL    IV PiggyBack: 800 mL    Phenylephrine: 18.4 mL    Phenylephrine: 55.2 mL    Phenylephrine: 209.8 mL    Plasma: 336 mL    Platelets - Single Donor: 203 mL    PRBCs (Packed Red Blood Cells): 900 mL    Propofol: 201.6 mL    sodium chloride 0.9%: 240 mL    Vasopressin: 19.5 mL  Total IN: 3117.5 mL    OUT:    Indwelling Catheter - Urethral (mL): 935 mL  Total OUT: 935 mL    Total NET: 2182.5 mL      15 May 2023 07:01  -  15 May 2023 12:33  --------------------------------------------------------  IN:    Dexmedetomidine: 7 mL    Phenylephrine: 5.2 mL    Propofol: 16.8 mL    sodium chloride 0.9%: 50 mL  Total IN: 79 mL    OUT:    Indwelling Catheter - Urethral (mL): 485 mL  Total OUT: 485 mL    Total NET: -406 mL          LABS:                        10.5   8.72  )-----------( 125      ( 15 May 2023 05:56 )             29.7     05-15    141  |  106  |  53<H>  ----------------------------<  96  3.8   |  22  |  2.06<H>    Ca    8.0<L>      15 May 2023 05:56  Phos  5.2     05-15  Mg     2.3     05-15    TPro  5.3<L>  /  Alb  2.7<L>  /  TBili  1.4<H>  /  DBili  x   /  AST  20  /  ALT  7<L>  /  AlkPhos  70  05-15    PT/INR - ( 15 May 2023 05:56 )   PT: 16.7 sec;   INR: 1.40          PTT - ( 15 May 2023 05:56 )  PTT:32.1 sec      RADIOLOGY & ADDITIONAL STUDIES:

## 2023-05-15 NOTE — PROGRESS NOTE ADULT - ASSESSMENT
51 YO Male, current every day marijuana use, w/ PMHx of HTN, type A aortic dissection s/p Dacron grafts, AV resuspension in 2013, CAD s/p CABG x 1 SVG to RCA (5/2013 with Dr. Medina), seizure disorder (last episode on 7/4/22) s/p TEVAR with Dr. Pierre 5/5 with post op leukocytosis and CT chest abd pelvis noting diffuse pancreatic enlargement and heterogenous attenuation predominantly in body and tail, with multiple intrapancreatic and peripancreatic fluid collections with well-defined enhancing wall, the largest fluid collection along stomach greater curvature 8.5 x 2 x 2 cm, communicates with inferior fluid collection measuring 5 x 3 x 3 cm with subsequent ICU course c/b acute onset of severe abdominal pain with repeat imaging noting enlarging and new left retroperitoneal hematoma in the left hemiabdomen and worsening hematoma involving the pancreas with worsening hemoperitoneum.    Not requiring pressor support at time of bedside eval this AM. Hgb responsive to transfusions    Imaging obtained limited in absence of PO/IV contrast to elucidate etiology of hemoperitoneum/RP hematoma.  Hemorrhagic pancreatitis remains on ddx given initial concern for ischemic pancreatitis as etiology of imaging findings.      Recommendations:  -Imaging pending review with surgery team  -If clinical picture suggests continued bleeding/re-bleed, would obtain CTA of a/p to help localize source of bleeding  -Supportive care for now  -Appreciate ICU/surgery team care    Ismael Potts DO  Gastroenterology Fellow  Pager: [phone]  After 5PM or on weekends, please contact Eric Hill  for fellow on call no

## 2023-05-15 NOTE — PROGRESS NOTE ADULT - SUBJECTIVE AND OBJECTIVE BOX
INTERVAL HPI/OVERNIGHT EVENTS:    3/20: AVR/arch replacement/CABG x 1/2nd stage TEVAR, aorto axillary bypass  EF 75%    54yo Male current every day marijuana use with a past medical hx of HTN, type A aortic dissection s/p Dacron grafts, AV resuspension in 2013,CAD s/p CABG x 1 SVG to RCA (5/2013 with Dr. Medina), Seizure disorder (last 7/4/22) presents for a follow up visit for evaluation and management of his aortic pathology. Patient is referred by Dr. Jacqueline Gonsales. Screen failed for Triomphe study     CTa C/A/P 11/30/22 - Status post graft repair of the ascending aorta and portion of aortic arch.  Dissecting aneurysm of the descending thoracic aorta (measuring up to 5.7 cm) and abdominal aorta (3.5 cm infrarenal), similar to the MR angiogram of 9/19/2022. Nonocclusive dissection flap present within the innominate artery, aneurysmal right subclavian artery and proximal right common carotid artery as well as aneurysmal left common iliac artery.    Cath (3/9/23): SVG-RCA patent, remaining vessels luminal irregularities. ACCESS: L radial w/ TR band for hemostasis.     to OR 3/20: intraop - 5L Crystalloid/7 units PRBC, 6unit FFP, 3unit platelet, 15unitc cryo, 1000cc fieba  arrived to ICU on Epi/Levo  postop - severe acidosis and Renal failure - lasix infusion started; bicarb given   EEG post-op and CT Head for poor mentation    CT Head 3/23: no acute intracranial abnormality   post-op Atrial fib - amiodarone & hypoxemia - bronch for pulm toilet  D10 started for noted hypoglycemia   CVVHD/Aquapharesis and later HD as needed - serial sessions for fluid removal to optimize before extubation     Extubated 2/29 to [facility] -    ultimately discharged -     Presented to [facility] 4/27 for syncope v seizure episode at home   Code stroke called - Imaging obtained unremarkable for cranial pathology - concern for endoleak - Esmolol infusion started and transferred to [facility] 4/27 - pancreatitis reported on imaging   of note - just seen by his neurologist and inc medication dosage recommended based on their exam 4/26  Neuro/Epilepsy consulted - EEG (-); CT spine to assess RUE weakness - negative  mental status clear - no witnessed seizures or change in mental status noted   patient discharged    patient returned 5/5 for planned TEVAR - lumbar drain placement   5/6 - Extubated - possible LE weakness - inc MAP/transfusion support and ongoing lumbar drainage with improvement    LD clamped and patient ambulated     5/8: repeat CTa C/A/P: Diffuse pancreatic enlargement and heterogenous attenuation   predominantly in body and tail, with multiple intrapancreatic and   peripancreatic fluid collections with well-defined enhancing wall, new   since November 30, 2022, not significantly changed since May 5, 2023 upon   directed review. Largest fluid collection along stomach greater curvature   8.5 x 2 x 2 cm, communicates with inferior fluid collection measuring 5 x   3 x 3 cm.  Main pancreatic duct at head/uncinate appears mildly dilated   up to 5 mm and is poorly visualized at pancreatic neck body and tail. Right groin   subcutaneous tissues most compatible with old hematoma or seroma 4 x 3 cm    5/9: right pigtail placement (700 cc) - apical PTX  (+)delirium reported    5/11 - extreme abd pain reported   urinary retention - coulter replaced - 600cc out  CT Head - no acute intracranial abnormality  CT C/A/P (noncontrast): dec Rt pleural effusion s/p chest tube placement, pigtail coiled in major fissure. Unchanged mod left pleural effusion with unchanged adjacent atelectasis/consolidation.  Limited pancreas evaluation. Unchanged marked fullness pancreatic body/tail with heterogenous attenuation. Stable or slightly decreased extrapancreatic/perigastric fluid collections on this suboptimal noncontrast study. Abbreviated differential includes pseudocyst, walled   off necrosis abscess. Recommend follow-up contrast-enhanced study to resolution.  Unchanged small moderate abdominopelvic ascites. Unchanged caliber aneurysmal thoracic and abdominal aorta status post endovascular repair.    5/12: drop in Hg 6.6 (+)hemorrhagic shock     repeat CT 5/12:  Interval worsening of the thickening of the pancreatic body and tail with hyperdense components suggesting hemorrhage. Additional cystic changes in the pancreatic head are not well evaluated on this study. Underlying pathology such as inflammation or neoplasm cannot be excluded based on these non contrast images.    intubated and taken to IR for embolization - 4 U pRBC given ; general surgery following - no arterial bleeding noted - no intervention   -  intercurrent seizure - epilepsy team contacted and meds adjusted ; EEG placed and ongoing     ongoing transfusion support requirement  in last 48 hours given - 11 U pRBC/3 FFP/2 Cryo/2 plt - 2 U pRBC given overnight        14 May 2023 07:01  -  15 May 2023 07:00  --------------------------------------------------------  IN: 3117.5 mL / OUT: 935 mL / NET: 2182.5 mL    15 May 2023 07:01  -  15 May 2023 10:02  --------------------------------------------------------  IN: 49 mL / OUT: 300 mL / NET: -251 mL        Mode: AC/ CMV (Assist Control/ Continuous Mandatory Ventilation)  RR (machine): 12  TV (machine): 500  FiO2: 60  PEEP: 5  ITime: 1  MAP: 11  PIP: 19      Physical Exam    Heart  Lungs  Abd  Ext  Chest  Neuro  Skin    LABS:                        10.5   8.72  )-----------( 125      ( 15 May 2023 05:56 )             29.7     05-15    141  |  106  |  53<H>  ----------------------------<  96  3.8   |  22  |  2.06<H>    Ca    8.0<L>      15 May 2023 05:56  Phos  5.2     05-15  Mg     2.3     05-15    TPro  5.3<L>  /  Alb  2.7<L>  /  TBili  1.4<H>  /  DBili  x   /  AST  20  /  ALT  7<L>  /  AlkPhos  70  05-15    PT/INR - ( 15 May 2023 05:56 )   PT: 16.7 sec;   INR: 1.40          PTT - ( 15 May 2023 05:56 )  PTT:32.1 sec    ABG - ( 15 May 2023 05:54 )  pH, Arterial: 7.44  pH, Blood: x     /  pCO2: 31    /  pO2: 82    / HCO3: 21    / Base Excess: -2.1  /  SaO2: 98.2                RADIOLOGY & ADDITIONAL STUDIES:    I have spent/provided stated minutes of critical care time to this patient:  INTERVAL HPI/OVERNIGHT EVENTS:    3/20: AVR/arch replacement/CABG x 1/2nd stage TEVAR, aorto axillary bypass  EF 75%    54yo Male current every day marijuana use with a past medical hx of HTN, type A aortic dissection s/p Dacron grafts, AV resuspension in 2013,CAD s/p CABG x 1 SVG to RCA (5/2013 with Dr. Medina), Seizure disorder (last 7/4/22) presents for a follow up visit for evaluation and management of his aortic pathology. Patient is referred by Dr. Jacqueline Gonsales. Screen failed for Triomphe study     CTa C/A/P 11/30/22 - Status post graft repair of the ascending aorta and portion of aortic arch.  Dissecting aneurysm of the descending thoracic aorta (measuring up to 5.7 cm) and abdominal aorta (3.5 cm infrarenal), similar to the MR angiogram of 9/19/2022. Nonocclusive dissection flap present within the innominate artery, aneurysmal right subclavian artery and proximal right common carotid artery as well as aneurysmal left common iliac artery.    Cath (3/9/23): SVG-RCA patent, remaining vessels luminal irregularities. ACCESS: L radial w/ TR band for hemostasis.     to OR 3/20: intraop - 5L Crystalloid/7 units PRBC, 6unit FFP, 3unit platelet, 15unitc cryo, 1000cc FEIBA  arrived to ICU on Epi/Levo  postop - severe acidosis and Renal failure - lasix infusion started; bicarb given   EEG post-op and CT Head for poor mentation    CT Head 3/23: no acute intracranial abnormality   post-op Atrial fib - amiodarone & hypoxemia - bronch for pulm toilet  D10 started for noted hypoglycemia   CVVHD/Aquapharesis and later HD as needed - serial sessions for fluid removal to optimize before extubation     Extubated 2/29 to [facility] -    ultimately discharged -     Presented to [facility] 4/27 for syncope v seizure episode at home   Code stroke called - Imaging obtained unremarkable for cranial pathology - concern for endoleak - Esmolol infusion started and transferred to [facility] 4/27 - pancreatitis reported on imaging   of note - just seen by his neurologist and inc medication dosage recommended based on their exam 4/26  Neuro/Epilepsy consulted - EEG (-); CT spine to assess RUE weakness - negative  mental status clear - no witnessed seizures or change in mental status noted   patient discharged    patient returned 5/5 for planned TEVAR - lumbar drain placement   5/6 - Extubated - possible LE weakness - inc MAP/transfusion support and ongoing lumbar drainage with improvement    LD clamped and patient ambulated     5/8: repeat CTa C/A/P: Diffuse pancreatic enlargement and heterogenous attenuation   predominantly in body and tail, with multiple intrapancreatic and peripancreatic fluid collections with well-defined enhancing wall, new since November 30, 2022, not significantly changed since May 5, 2023 upon directed review. Largest fluid collection along stomach greater curvature   8.5 x 2 x 2 cm, communicates with inferior fluid collection measuring 5 x 3 x 3 cm.  Main pancreatic duct at head/uncinate appears mildly dilated up to 5 mm and is poorly visualized at pancreatic neck body and tail. Right groin subcutaneous tissues most compatible with old hematoma or seroma 4 x 3 cm    5/9: right pigtail placement (700 cc) - apical PTX  (+)delirium reported    5/11 - extreme abd pain reported   urinary retention - coulter replaced - 600cc out  CT Head - no acute intracranial abnormality  CT C/A/P (noncontrast): dec Rt pleural effusion s/p chest tube placement, pigtail coiled in major fissure. Unchanged mod left pleural effusion with unchanged adjacent atelectasis/consolidation.  Limited pancreas evaluation. Unchanged marked fullness pancreatic body/tail with heterogenous attenuation. Stable or slightly decreased extrapancreatic/perigastric fluid collections on this suboptimal noncontrast study. Abbreviated differential includes pseudocyst, walled off necrosis abscess. Recommend follow-up contrast-enhanced study to resolution. Unchanged small moderate abdominopelvic ascites. Unchanged caliber aneurysmal thoracic and abdominal aorta status post endovascular repair.    5/12: drop in Hg 6.6 (+)hemorrhagic shock     repeat CT 5/12:  Interval worsening of the thickening of the pancreatic body and tail with hyperdense components suggesting hemorrhage. Additional cystic changes in the pancreatic head are not well evaluated on this study. Underlying pathology such as inflammation or neoplasm cannot be excluded based on these non contrast images.    intubated and taken to IR for embolization - 4 U pRBC given ; general surgery following - no arterial bleeding noted - no intervention    intercurrent seizure - epilepsy team contacted and meds adjusted ; EEG placed and ongoing     ongoing transfusion support requirement  in last 48 hours given - 11 U pRBC/3 FFP/2 Cryo/2 plt - 2 U pRBC given overnight    Tito titrated off at 9a this am       14 May 2023 07:01  -  15 May 2023 07:00  --------------------------------------------------------  IN: 3117.5 mL / OUT: 935 mL / NET: 2182.5 mL    15 May 2023 07:01  -  15 May 2023 10:02  --------------------------------------------------------  IN: 49 mL / OUT: 300 mL / NET: -251 mL    Mode: AC/ CMV (Assist Control/ Continuous Mandatory Ventilation)  RR (machine): 12  TV (machine): 500  FiO2: 60  PEEP: 5  ITime: 1  MAP: 11  PIP: 19      Physical Exam    Heart - regular (-)rub/gallop  Lungs - BS appreciated bilaterally - no rhonchi/wheeze  Abd - (+)BS soft NTND (-)r/r/g  Ext - warm to touch; no cyanosis/clubbing   Neuro- pupils reactive to light - sedate - otherwise unable at this time  Skin - no rash     LABS:                        10.5   8.72  )-----------( 125      ( 15 May 2023 05:56 )             29.7     05-15    141  |  106  |  53<H>  ----------------------------<  96  3.8   |  22  |  2.06<H>    Ca    8.0<L>      15 May 2023 05:56  Phos  5.2     05-15  Mg     2.3     05-15    TPro  5.3<L>  /  Alb  2.7<L>  /  TBili  1.4<H>  /  DBili  x   /  AST  20  /  ALT  7<L>  /  AlkPhos  70  05-15    PT/INR - ( 15 May 2023 05:56 )   PT: 16.7 sec;   INR: 1.40     PTT - ( 15 May 2023 05:56 )  PTT:32.1 sec    ABG - ( 15 May 2023 05:54 )  pH, Arterial: 7.44  pH, Blood: x     /  pCO2: 31    /  pO2: 82    / HCO3: 21    / Base Excess: -2.1  /  SaO2: 98.2      RADIOLOGY & ADDITIONAL STUDIES: reviewed     Patient with complicated medical and surgical Hx with known pancreatitis and seizure disorder with uncontrolled seizures presenting for planned TEVAR 5/5 with some post-op LE weakness - improved with transfusion/inc MAP and lumbar drain - since removed with intercurrent progression of pancreatitis with bleeding/hemorrhagic shock - being followed by surgery; attempted IR - no arterial bleeding/no intervention - ongoing transfusion requirement     1. CV  Hemorrhagic shock - improved   titrated off Tito with transfusion requirement this am   sinus  monitor for ongoing transfusion requirement - serial labs  if need additional products will obtain 3 phase/contrast scan to assess  ASA    2. GI  pancreatitis - known from prior admit   valproate can be associated with pancreatitis - to d/w neuro/epilepsy medications - complicated as patient with known seizure disorder and intermittent seizures on last admit and this admit  Most recent imaging 5/12   (as above)  surgery following  in light of noted significant transfusion requirement will given additional factors - 2 FFP/1 Cryo and 1 platelet  new line placement today and to save one port for anticipated TPN    3. Neuro  known seizure do and witnessed seizure on this admit  vimpat now 100 IV q8h; valproate 1000 q12h   EEG remains in place   to discuss with neuro possible use of transition to alternate agents due to association with pancreatitis     4. Renal  monitor UO/Lytes and Cr  avoid nephrotoxins as much as able     SCD and GI prophylaxis     d/w family present at bedside/staff and CTS     I have spent/provided stated minutes of nonprocedural critical care time to this patient: 90

## 2023-05-15 NOTE — PROGRESS NOTE ADULT - ATTENDING COMMENTS
History of epilepsy on VA 1 g BID and LCM 100mg BID.  There was an event yesterday that was concerning for seizure so LCM increased to 100mg TID, however there were no seizure activity on EEG upon further review.  Complicated hospital course, found to have hemorrhagic pancreatitis  Cases of life-threatening pancreatitis have been reported in patients receiving VA, has been seen to occur after initial use as well as after several years of use. Given concern for this potential serious side effects will plan to taper off VA.

## 2023-05-16 LAB
ALBUMIN SERPL ELPH-MCNC: 2.7 G/DL — LOW (ref 3.3–5)
ALBUMIN SERPL ELPH-MCNC: 2.8 G/DL — LOW (ref 3.3–5)
ALBUMIN SERPL ELPH-MCNC: 3 G/DL — LOW (ref 3.3–5)
ALP SERPL-CCNC: 101 U/L — SIGNIFICANT CHANGE UP (ref 40–120)
ALP SERPL-CCNC: 85 U/L — SIGNIFICANT CHANGE UP (ref 40–120)
ALP SERPL-CCNC: 85 U/L — SIGNIFICANT CHANGE UP (ref 40–120)
ALT FLD-CCNC: 7 U/L — LOW (ref 10–45)
ALT FLD-CCNC: 8 U/L — LOW (ref 10–45)
ALT FLD-CCNC: 8 U/L — LOW (ref 10–45)
ANION GAP SERPL CALC-SCNC: 11 MMOL/L — SIGNIFICANT CHANGE UP (ref 5–17)
ANION GAP SERPL CALC-SCNC: 13 MMOL/L — SIGNIFICANT CHANGE UP (ref 5–17)
ANION GAP SERPL CALC-SCNC: 14 MMOL/L — SIGNIFICANT CHANGE UP (ref 5–17)
APTT BLD: 30.8 SEC — SIGNIFICANT CHANGE UP (ref 27.5–35.5)
APTT BLD: 31.5 SEC — SIGNIFICANT CHANGE UP (ref 27.5–35.5)
APTT BLD: 32.3 SEC — SIGNIFICANT CHANGE UP (ref 27.5–35.5)
AST SERPL-CCNC: 16 U/L — SIGNIFICANT CHANGE UP (ref 10–40)
AST SERPL-CCNC: 19 U/L — SIGNIFICANT CHANGE UP (ref 10–40)
AST SERPL-CCNC: 19 U/L — SIGNIFICANT CHANGE UP (ref 10–40)
BILIRUB SERPL-MCNC: 1 MG/DL — SIGNIFICANT CHANGE UP (ref 0.2–1.2)
BILIRUB SERPL-MCNC: 1.1 MG/DL — SIGNIFICANT CHANGE UP (ref 0.2–1.2)
BILIRUB SERPL-MCNC: 1.4 MG/DL — HIGH (ref 0.2–1.2)
BUN SERPL-MCNC: 48 MG/DL — HIGH (ref 7–23)
BUN SERPL-MCNC: 57 MG/DL — HIGH (ref 7–23)
BUN SERPL-MCNC: 57 MG/DL — HIGH (ref 7–23)
CALCIUM SERPL-MCNC: 8.8 MG/DL — SIGNIFICANT CHANGE UP (ref 8.4–10.5)
CALCIUM SERPL-MCNC: 9.1 MG/DL — SIGNIFICANT CHANGE UP (ref 8.4–10.5)
CALCIUM SERPL-MCNC: 9.1 MG/DL — SIGNIFICANT CHANGE UP (ref 8.4–10.5)
CHLORIDE SERPL-SCNC: 106 MMOL/L — SIGNIFICANT CHANGE UP (ref 96–108)
CHLORIDE SERPL-SCNC: 108 MMOL/L — SIGNIFICANT CHANGE UP (ref 96–108)
CHLORIDE SERPL-SCNC: 108 MMOL/L — SIGNIFICANT CHANGE UP (ref 96–108)
CO2 SERPL-SCNC: 21 MMOL/L — LOW (ref 22–31)
CO2 SERPL-SCNC: 22 MMOL/L — SIGNIFICANT CHANGE UP (ref 22–31)
CO2 SERPL-SCNC: 22 MMOL/L — SIGNIFICANT CHANGE UP (ref 22–31)
CREAT SERPL-MCNC: 1.54 MG/DL — HIGH (ref 0.5–1.3)
CREAT SERPL-MCNC: 1.71 MG/DL — HIGH (ref 0.5–1.3)
CREAT SERPL-MCNC: 1.93 MG/DL — HIGH (ref 0.5–1.3)
EGFR: 41 ML/MIN/1.73M2 — LOW
EGFR: 47 ML/MIN/1.73M2 — LOW
EGFR: 53 ML/MIN/1.73M2 — LOW
GAS PNL BLDA: SIGNIFICANT CHANGE UP
GLUCOSE SERPL-MCNC: 128 MG/DL — HIGH (ref 70–99)
GLUCOSE SERPL-MCNC: 131 MG/DL — HIGH (ref 70–99)
GLUCOSE SERPL-MCNC: 136 MG/DL — HIGH (ref 70–99)
HCT VFR BLD CALC: 31.3 % — LOW (ref 39–50)
HCT VFR BLD CALC: 32.8 % — LOW (ref 39–50)
HCT VFR BLD CALC: 37 % — LOW (ref 39–50)
HGB BLD-MCNC: 10.8 G/DL — LOW (ref 13–17)
HGB BLD-MCNC: 11.3 G/DL — LOW (ref 13–17)
HGB BLD-MCNC: 12.6 G/DL — LOW (ref 13–17)
INR BLD: 1.26 — HIGH (ref 0.88–1.16)
INR BLD: 1.28 — HIGH (ref 0.88–1.16)
INR BLD: 1.29 — HIGH (ref 0.88–1.16)
LACTATE SERPL-SCNC: 1 MMOL/L — SIGNIFICANT CHANGE UP (ref 0.5–2)
LACTATE SERPL-SCNC: 1.3 MMOL/L — SIGNIFICANT CHANGE UP (ref 0.5–2)
LACTATE SERPL-SCNC: 1.4 MMOL/L — SIGNIFICANT CHANGE UP (ref 0.5–2)
MAGNESIUM SERPL-MCNC: 2.2 MG/DL — SIGNIFICANT CHANGE UP (ref 1.6–2.6)
MAGNESIUM SERPL-MCNC: 2.2 MG/DL — SIGNIFICANT CHANGE UP (ref 1.6–2.6)
MAGNESIUM SERPL-MCNC: 2.3 MG/DL — SIGNIFICANT CHANGE UP (ref 1.6–2.6)
MCHC RBC-ENTMCNC: 29.9 PG — SIGNIFICANT CHANGE UP (ref 27–34)
MCHC RBC-ENTMCNC: 30.1 PG — SIGNIFICANT CHANGE UP (ref 27–34)
MCHC RBC-ENTMCNC: 30.1 PG — SIGNIFICANT CHANGE UP (ref 27–34)
MCHC RBC-ENTMCNC: 34.1 GM/DL — SIGNIFICANT CHANGE UP (ref 32–36)
MCHC RBC-ENTMCNC: 34.5 GM/DL — SIGNIFICANT CHANGE UP (ref 32–36)
MCHC RBC-ENTMCNC: 34.5 GM/DL — SIGNIFICANT CHANGE UP (ref 32–36)
MCV RBC AUTO: 86.7 FL — SIGNIFICANT CHANGE UP (ref 80–100)
MCV RBC AUTO: 87.2 FL — SIGNIFICANT CHANGE UP (ref 80–100)
MCV RBC AUTO: 88.3 FL — SIGNIFICANT CHANGE UP (ref 80–100)
NRBC # BLD: 0 /100 WBCS — SIGNIFICANT CHANGE UP (ref 0–0)
PHOSPHATE SERPL-MCNC: 3 MG/DL — SIGNIFICANT CHANGE UP (ref 2.5–4.5)
PHOSPHATE SERPL-MCNC: 3.2 MG/DL — SIGNIFICANT CHANGE UP (ref 2.5–4.5)
PHOSPHATE SERPL-MCNC: 3.8 MG/DL — SIGNIFICANT CHANGE UP (ref 2.5–4.5)
PLATELET # BLD AUTO: 157 K/UL — SIGNIFICANT CHANGE UP (ref 150–400)
PLATELET # BLD AUTO: 164 K/UL — SIGNIFICANT CHANGE UP (ref 150–400)
PLATELET # BLD AUTO: 189 K/UL — SIGNIFICANT CHANGE UP (ref 150–400)
POTASSIUM SERPL-MCNC: 3.3 MMOL/L — LOW (ref 3.5–5.3)
POTASSIUM SERPL-MCNC: 3.7 MMOL/L — SIGNIFICANT CHANGE UP (ref 3.5–5.3)
POTASSIUM SERPL-MCNC: 3.8 MMOL/L — SIGNIFICANT CHANGE UP (ref 3.5–5.3)
POTASSIUM SERPL-SCNC: 3.3 MMOL/L — LOW (ref 3.5–5.3)
POTASSIUM SERPL-SCNC: 3.7 MMOL/L — SIGNIFICANT CHANGE UP (ref 3.5–5.3)
POTASSIUM SERPL-SCNC: 3.8 MMOL/L — SIGNIFICANT CHANGE UP (ref 3.5–5.3)
PROT SERPL-MCNC: 6 G/DL — SIGNIFICANT CHANGE UP (ref 6–8.3)
PROT SERPL-MCNC: 6.1 G/DL — SIGNIFICANT CHANGE UP (ref 6–8.3)
PROT SERPL-MCNC: 6.2 G/DL — SIGNIFICANT CHANGE UP (ref 6–8.3)
PROTHROM AB SERPL-ACNC: 15 SEC — HIGH (ref 10.5–13.4)
PROTHROM AB SERPL-ACNC: 15.3 SEC — HIGH (ref 10.5–13.4)
PROTHROM AB SERPL-ACNC: 15.4 SEC — HIGH (ref 10.5–13.4)
RBC # BLD: 3.61 M/UL — LOW (ref 4.2–5.8)
RBC # BLD: 3.76 M/UL — LOW (ref 4.2–5.8)
RBC # BLD: 4.19 M/UL — LOW (ref 4.2–5.8)
RBC # FLD: 15.9 % — HIGH (ref 10.3–14.5)
RBC # FLD: 16.1 % — HIGH (ref 10.3–14.5)
RBC # FLD: 16.1 % — HIGH (ref 10.3–14.5)
SODIUM SERPL-SCNC: 141 MMOL/L — SIGNIFICANT CHANGE UP (ref 135–145)
SODIUM SERPL-SCNC: 142 MMOL/L — SIGNIFICANT CHANGE UP (ref 135–145)
SODIUM SERPL-SCNC: 142 MMOL/L — SIGNIFICANT CHANGE UP (ref 135–145)
WBC # BLD: 11.34 K/UL — HIGH (ref 3.8–10.5)
WBC # BLD: 11.85 K/UL — HIGH (ref 3.8–10.5)
WBC # BLD: 8.23 K/UL — SIGNIFICANT CHANGE UP (ref 3.8–10.5)
WBC # FLD AUTO: 11.34 K/UL — HIGH (ref 3.8–10.5)
WBC # FLD AUTO: 11.85 K/UL — HIGH (ref 3.8–10.5)
WBC # FLD AUTO: 8.23 K/UL — SIGNIFICANT CHANGE UP (ref 3.8–10.5)

## 2023-05-16 PROCEDURE — 99291 CRITICAL CARE FIRST HOUR: CPT

## 2023-05-16 PROCEDURE — 99231 SBSQ HOSP IP/OBS SF/LOW 25: CPT

## 2023-05-16 RX ORDER — ELECTROLYTE SOLUTION,INJ
1 VIAL (ML) INTRAVENOUS
Refills: 0 | Status: DISCONTINUED | OUTPATIENT
Start: 2023-05-16 | End: 2023-05-16

## 2023-05-16 RX ORDER — ALBUMIN HUMAN 25 %
250 VIAL (ML) INTRAVENOUS
Refills: 0 | Status: COMPLETED | OUTPATIENT
Start: 2023-05-16 | End: 2023-05-16

## 2023-05-16 RX ORDER — PHENYLEPHRINE HYDROCHLORIDE 10 MG/ML
0.1 INJECTION INTRAVENOUS
Qty: 40 | Refills: 0 | Status: DISCONTINUED | OUTPATIENT
Start: 2023-05-16 | End: 2023-05-17

## 2023-05-16 RX ORDER — ATORVASTATIN CALCIUM 80 MG/1
20 TABLET, FILM COATED ORAL AT BEDTIME
Refills: 0 | Status: DISCONTINUED | OUTPATIENT
Start: 2023-05-16 | End: 2023-05-16

## 2023-05-16 RX ORDER — FENTANYL CITRATE 50 UG/ML
25 INJECTION INTRAVENOUS
Refills: 0 | Status: DISCONTINUED | OUTPATIENT
Start: 2023-05-16 | End: 2023-05-18

## 2023-05-16 RX ORDER — POLYETHYLENE GLYCOL 3350 17 G/17G
17 POWDER, FOR SOLUTION ORAL DAILY
Refills: 0 | Status: DISCONTINUED | OUTPATIENT
Start: 2023-05-16 | End: 2023-05-16

## 2023-05-16 RX ORDER — POTASSIUM CHLORIDE 20 MEQ
20 PACKET (EA) ORAL ONCE
Refills: 0 | Status: COMPLETED | OUTPATIENT
Start: 2023-05-16 | End: 2023-05-16

## 2023-05-16 RX ORDER — ALBUMIN HUMAN 25 %
250 VIAL (ML) INTRAVENOUS
Refills: 0 | Status: COMPLETED | OUTPATIENT
Start: 2023-05-16 | End: 2023-05-17

## 2023-05-16 RX ORDER — VALPROIC ACID (AS SODIUM SALT) 250 MG/5ML
500 SOLUTION, ORAL ORAL EVERY 12 HOURS
Refills: 0 | Status: DISCONTINUED | OUTPATIENT
Start: 2023-05-16 | End: 2023-05-17

## 2023-05-16 RX ORDER — DEXMEDETOMIDINE HYDROCHLORIDE IN 0.9% SODIUM CHLORIDE 4 UG/ML
0.2 INJECTION INTRAVENOUS
Qty: 400 | Refills: 0 | Status: DISCONTINUED | OUTPATIENT
Start: 2023-05-16 | End: 2023-05-17

## 2023-05-16 RX ORDER — ACETAMINOPHEN 500 MG
1000 TABLET ORAL ONCE
Refills: 0 | Status: COMPLETED | OUTPATIENT
Start: 2023-05-16 | End: 2023-05-16

## 2023-05-16 RX ORDER — METOPROLOL TARTRATE 50 MG
2.5 TABLET ORAL EVERY 6 HOURS
Refills: 0 | Status: DISCONTINUED | OUTPATIENT
Start: 2023-05-16 | End: 2023-05-17

## 2023-05-16 RX ORDER — PROPOFOL 10 MG/ML
30 INJECTION, EMULSION INTRAVENOUS
Qty: 1000 | Refills: 0 | Status: DISCONTINUED | OUTPATIENT
Start: 2023-05-16 | End: 2023-05-18

## 2023-05-16 RX ORDER — POTASSIUM CHLORIDE 20 MEQ
20 PACKET (EA) ORAL
Refills: 0 | Status: COMPLETED | OUTPATIENT
Start: 2023-05-16 | End: 2023-05-16

## 2023-05-16 RX ADMIN — Medication 250 MILLILITER(S): at 22:33

## 2023-05-16 RX ADMIN — FENTANYL CITRATE 25 MICROGRAM(S): 50 INJECTION INTRAVENOUS at 21:05

## 2023-05-16 RX ADMIN — LACOSAMIDE 140 MILLIGRAM(S): 50 TABLET ORAL at 10:12

## 2023-05-16 RX ADMIN — CHLORHEXIDINE GLUCONATE 1 APPLICATION(S): 213 SOLUTION TOPICAL at 05:45

## 2023-05-16 RX ADMIN — LACOSAMIDE 140 MILLIGRAM(S): 50 TABLET ORAL at 21:17

## 2023-05-16 RX ADMIN — Medication 1000 MILLIGRAM(S): at 13:00

## 2023-05-16 RX ADMIN — FENTANYL CITRATE 25 MICROGRAM(S): 50 INJECTION INTRAVENOUS at 21:25

## 2023-05-16 RX ADMIN — Medication 250 MILLILITER(S): at 21:41

## 2023-05-16 RX ADMIN — Medication 1 EACH: at 18:34

## 2023-05-16 RX ADMIN — DEXMEDETOMIDINE HYDROCHLORIDE IN 0.9% SODIUM CHLORIDE 3.5 MICROGRAM(S)/KG/HR: 4 INJECTION INTRAVENOUS at 03:02

## 2023-05-16 RX ADMIN — Medication 50 MILLIEQUIVALENT(S): at 02:46

## 2023-05-16 RX ADMIN — PANTOPRAZOLE SODIUM 40 MILLIGRAM(S): 20 TABLET, DELAYED RELEASE ORAL at 10:12

## 2023-05-16 RX ADMIN — Medication 100 MILLIEQUIVALENT(S): at 11:24

## 2023-05-16 RX ADMIN — Medication 2.5 MILLIGRAM(S): at 17:51

## 2023-05-16 RX ADMIN — Medication 2.5 MILLIGRAM(S): at 12:26

## 2023-05-16 RX ADMIN — Medication 81 MILLIGRAM(S): at 10:12

## 2023-05-16 RX ADMIN — Medication 400 MILLIGRAM(S): at 12:26

## 2023-05-16 RX ADMIN — Medication 55 MILLIGRAM(S): at 18:24

## 2023-05-16 RX ADMIN — Medication 50 MILLIEQUIVALENT(S): at 05:45

## 2023-05-16 RX ADMIN — Medication 55 MILLIGRAM(S): at 05:44

## 2023-05-16 RX ADMIN — FENTANYL CITRATE 25 MICROGRAM(S): 50 INJECTION INTRAVENOUS at 00:15

## 2023-05-16 RX ADMIN — CHLORHEXIDINE GLUCONATE 15 MILLILITER(S): 213 SOLUTION TOPICAL at 18:12

## 2023-05-16 RX ADMIN — CHLORHEXIDINE GLUCONATE 15 MILLILITER(S): 213 SOLUTION TOPICAL at 05:44

## 2023-05-16 RX ADMIN — Medication 250 MILLILITER(S): at 23:15

## 2023-05-16 RX ADMIN — Medication 50 MILLIEQUIVALENT(S): at 03:45

## 2023-05-16 NOTE — PROGRESS NOTE ADULT - CRITICAL CARE ATTENDING COMMENT
History of epilepsy previously on VA 1 g BID and LCM 100mg BID.  Patient seen and evaluated 5/16  intubated on sedation   Complicated hospital course, found to have hemorrhagic pancreatitis  Cases of life-threatening pancreatitis have been reported in patients receiving VA, so tapering off.  EEG has been negative for seizure.  Plan as above

## 2023-05-16 NOTE — PROGRESS NOTE ADULT - ASSESSMENT
53 YO Male, current every day marijuana use, w/ PMHx of HTN, type A aortic dissection s/p Dacron grafts, AV resuspension in 2013, CAD s/p CABG x 1 SVG to RCA (5/2013 with Dr. Medina), seizure disorder (last episode on 7/4/22) s/p TEVAR with Dr. Pierre 5/5 with post op leukocytosis and CT chest abd pelvis noting diffuse pancreatic enlargement and heterogenous attenuation predominantly in body and tail, with multiple intrapancreatic and peripancreatic fluid collections with well-defined enhancing wall, the largest fluid collection along stomach greater curvature 8.5 x 2 x 2 cm, communicates with inferior fluid collection measuring 5 x 3 x 3 cm with subsequent ICU course c/b acute onset of severe abdominal pain with repeat imaging noting enlarging and new left retroperitoneal hematoma in the left hemiabdomen and worsening hematoma involving the pancreas with worsening hemoperitoneum.    Not requiring pressor support at time of bedside eval this AM. Hgb responsive to transfusions and stable.    Imaging obtained limited in absence of PO/IV contrast to elucidate etiology of hemoperitoneum/RP hematoma.  Hemorrhagic pancreatitis remains on ddx given initial concern for ischemic pancreatitis as etiology of imaging findings though specific source remains unclear    Recommendations:  -Consider sampling multiple abd/pelvis fluid collections with IR and sending for fluid analysis with amylase  -If clinical picture suggests continued bleeding/re-bleed, would obtain CTA of a/p to help localize source of bleeding  -Supportive care for now  -Appreciate ICU/surgery team care    No indication for endoscopic intervention at this time  Case discussed with Dr. Jaqueline Potts DO  Gastroenterology Fellow  Pager: [phone]  After 5PM or on weekends, please contact Eric Hill  for fellow on call

## 2023-05-16 NOTE — PROGRESS NOTE ADULT - SUBJECTIVE AND OBJECTIVE BOX
Pt seen and examined at bedside.  Afebrile and HDS overnight.  Hgb stable.  Remains intubated/sedated    Allergies    No Known Allergies    Intolerances        MEDICATIONS:  MEDICATIONS  (STANDING):  aspirin enteric coated 81 milliGRAM(s) Oral daily  atorvastatin 20 milliGRAM(s) Oral at bedtime  chlorhexidine 0.12% Liquid 15 milliLiter(s) Oral Mucosa every 12 hours  chlorhexidine 2% Cloths 1 Application(s) Topical daily  dexMEDEtomidine Infusion 0.2 MICROgram(s)/kG/Hr (3.5 mL/Hr) IV Continuous <Continuous>  lacosamide IVPB 200 milliGRAM(s) IV Intermittent every 12 hours  pantoprazole  Injectable 40 milliGRAM(s) IV Push daily  Parenteral Nutrition - Adult 1 Each (50 mL/Hr) TPN Continuous <Continuous>  Parenteral Nutrition - Adult 1 Each TPN Continuous <Continuous>  polyethylene glycol 3350 17 Gram(s) Oral daily  propofol Infusion 10 MICROgram(s)/kG/Min (4.2 mL/Hr) IV Continuous <Continuous>  senna 2 Tablet(s) Oral at bedtime  sodium chloride 0.9%. 1000 milliLiter(s) (10 mL/Hr) IV Continuous <Continuous>  valproate sodium  IVPB 500 milliGRAM(s) IV Intermittent every 12 hours    MEDICATIONS  (PRN):  acetaminophen     Tablet .. 650 milliGRAM(s) Oral every 6 hours PRN Mild Pain (1 - 3)  fentaNYL    Injectable 25 MICROGram(s) IV Push every 3 hours PRN for breakthrough pain      Vital Signs Last 24 Hrs  T(C): 36.3 (16 May 2023 08:50), Max: 37.8 (15 May 2023 21:00)  T(F): 97.4 (16 May 2023 08:50), Max: 100 (15 May 2023 21:00)  HR: 103 (16 May 2023 09:00) (87 - 124)  BP: --  BP(mean): --  RR: 12 (16 May 2023 09:00) (12 - 30)  SpO2: 97% (16 May 2023 09:00) (94% - 97%)    Parameters below as of 16 May 2023 09:00  Patient On (Oxygen Delivery Method): ventilator    O2 Concentration (%): 50    05-15 @ 07:01  -  05-16 @ 07:00  --------------------------------------------------------  IN: 3115.6 mL / OUT: 1378 mL / NET: 1737.6 mL    05-16 @ 07:01  -  05-16 @ 09:40  --------------------------------------------------------  IN: 161.3 mL / OUT: 105 mL / NET: 56.3 mL        PHYSICAL EXAM:    General: Intubated/sedated  Cardiac: Regular rate on tele  Pulm: Vent support  Gastrointestinal: Soft non-distended  Skin: Warm and dry. No obvious rash    LABS:  CBC Full  -  ( 16 May 2023 00:44 )  WBC Count : 8.23 K/uL  RBC Count : 3.61 M/uL  Hemoglobin : 10.8 g/dL  Hematocrit : 31.3 %  Platelet Count - Automated : 157 K/uL  Mean Cell Volume : 86.7 fl  Mean Cell Hemoglobin : 29.9 pg  Mean Cell Hemoglobin Concentration : 34.5 gm/dL  Auto Neutrophil # : x  Auto Lymphocyte # : x  Auto Monocyte # : x  Auto Eosinophil # : x  Auto Basophil # : x  Auto Neutrophil % : x  Auto Lymphocyte % : x  Auto Monocyte % : x  Auto Eosinophil % : x  Auto Basophil % : x    05-16    142  |  106  |  48<H>  ----------------------------<  131<H>  3.3<L>   |  22  |  1.93<H>    Ca    9.1      16 May 2023 00:44  Phos  3.8     05-16  Mg     2.3     05-16    TPro  6.1  /  Alb  3.0<L>  /  TBili  1.1  /  DBili  x   /  AST  19  /  ALT  8<L>  /  AlkPhos  85  05-16    PT/INR - ( 16 May 2023 00:44 )   PT: 15.3 sec;   INR: 1.28          PTT - ( 16 May 2023 00:44 )  PTT:30.8 sec

## 2023-05-16 NOTE — PROGRESS NOTE ADULT - SUBJECTIVE AND OBJECTIVE BOX
SUBJECTIVE: Patient seen and evaluated. Remains intubated and sedated.         MEDICATIONS  (STANDING):  aspirin enteric coated 81 milliGRAM(s) Oral daily  atorvastatin 20 milliGRAM(s) Oral at bedtime  chlorhexidine 0.12% Liquid 15 milliLiter(s) Oral Mucosa every 12 hours  chlorhexidine 2% Cloths 1 Application(s) Topical daily  dexMEDEtomidine Infusion 0.2 MICROgram(s)/kG/Hr (3.5 mL/Hr) IV Continuous <Continuous>  lacosamide IVPB 200 milliGRAM(s) IV Intermittent every 12 hours  pantoprazole  Injectable 40 milliGRAM(s) IV Push daily  Parenteral Nutrition - Adult 1 Each (50 mL/Hr) TPN Continuous <Continuous>  polyethylene glycol 3350 17 Gram(s) Oral daily  propofol Infusion 10 MICROgram(s)/kG/Min (4.2 mL/Hr) IV Continuous <Continuous>  senna 2 Tablet(s) Oral at bedtime  sodium chloride 0.9%. 1000 milliLiter(s) (10 mL/Hr) IV Continuous <Continuous>  valproate sodium  IVPB 500 milliGRAM(s) IV Intermittent every 12 hours    MEDICATIONS  (PRN):  acetaminophen     Tablet .. 650 milliGRAM(s) Oral every 6 hours PRN Mild Pain (1 - 3)  fentaNYL    Injectable 25 MICROGram(s) IV Push every 3 hours PRN for breakthrough pain      Vital Signs Last 24 Hrs  T(C): 36.8 (16 May 2023 05:05), Max: 37.8 (15 May 2023 21:00)  T(F): 98.3 (16 May 2023 05:05), Max: 100 (15 May 2023 21:00)  HR: 98 (16 May 2023 07:00) (86 - 124)  BP: --  BP(mean): --  RR: 25 (16 May 2023 07:00) (22 - 30)  SpO2: 96% (16 May 2023 07:00) (94% - 97%)    Parameters below as of 16 May 2023 08:00  Patient On (Oxygen Delivery Method): ventilator    O2 Concentration (%): 50    Physical Exam:  CV: Normal rate, regular rhythm  Pulm: Overbreathing vent on MV with equal chest rise.  Abd: Mildly distended; mild reaction to palpation despite sedation  : milagro urine  Skin: No rashes  Extremities: No edema.  Psychiatric: Sedated      I&O's Summary    15 May 2023 07:01  -  16 May 2023 07:00  --------------------------------------------------------  IN: 3115.6 mL / OUT: 1378 mL / NET: 1737.6 mL        LABS:                        10.8   8.23  )-----------( 157      ( 16 May 2023 00:44 )             31.3     05-16    142  |  106  |  48<H>  ----------------------------<  131<H>  3.3<L>   |  22  |  1.93<H>    Ca    9.1      16 May 2023 00:44  Phos  3.8     05-16  Mg     2.3     05-16    TPro  6.1  /  Alb  3.0<L>  /  TBili  1.1  /  DBili  x   /  AST  19  /  ALT  8<L>  /  AlkPhos  85  05-16    PT/INR - ( 16 May 2023 00:44 )   PT: 15.3 sec;   INR: 1.28          PTT - ( 16 May 2023 00:44 )  PTT:30.8 sec    CAPILLARY BLOOD GLUCOSE        LIVER FUNCTIONS - ( 16 May 2023 00:44 )  Alb: 3.0 g/dL / Pro: 6.1 g/dL / ALK PHOS: 85 U/L / ALT: 8 U/L / AST: 19 U/L / GGT: x             RADIOLOGY & ADDITIONAL STUDIES:

## 2023-05-16 NOTE — PROGRESS NOTE ADULT - SUBJECTIVE AND OBJECTIVE BOX
CTICU  CRITICAL  CARE  attending     Hand off received 					   Pertinent clinical, laboratory, radiographic, hemodynamic, echocardiographic, respiratory data, microbiologic data and chart were reviewed and analyzed frequently throughout the course of the day  Patient seen and examined with CTS/ SH attending at bedside  Pt is a  53yr old male with PMH HTN, type A dissection sp Dacron grafts and AV resuspension (2013), CAD sp CABG x 1 (Adam, 5/2013, SVG-RCA), seizures, admitted for surgical mgmt of progression of aneurysmal disease. Recent admission 3/20-4/14 during which patient underwent replacement of transverse aortic arch, second stage thoracic endovascular aortic repair, aorto-axillary bypass, AV replacement (bio 23mm), and CABG x 1 (SVG-RCA) EF 75% (Ignacio, 3/20). Post op cb lactic acidosis, severe cardiogenic and vasogenic shock, TREY requiring CVVHD and temporary dialysis requirement. Recent admission 4/27-4/30 for seizure episode, were his AEDS were adjusted. Presented to [facility] ED 5/4 for epigastric pain. CT exam which showed known endoleak for which pt was tx to [facility]. Patient underwent TEVAR with Dr. Pierre 5/5, LD placed prior by NSGY. Arrived intubated on propofol. 5/6 extubated. 1 unit pRBC, CTH performed for decreased LE mvmt. Improved later in day. 5/7 LD clamped and patient ambulated, plan for dc. 5/8 US showing groin seroma and hematoma. CT scan showing necrotizing pancreatitis. Zosyn started and Gen surg consulted. Recommended IR and GI consults and further evaluation with imaging. Gi believes 2/2 insults from surgeries back in March and recommended outpatient fu. 5/9 new RIJ and PA catheter placed, R pigtail placed cb pneumothorax. 5/11 gen surg signed off. Repeat imaging showing concern for possible malignancy in pancreatic tail. Radiology attending called during day to say findings likely 2/2 pancreatitis and recommended outpatient contrast study. Around 7pm patient had questionable seizure-like episode. Given 2mg ativan. Labs sig for Hb drop to ~6. TREY with oliguria. Stat CT showing [id number] hematoma, mod-large ascites with layering in pelvis. Given 4 units pRBC and sent to IR for possible intervention. 5/13 returned from Ir intubated, no arterial bleeder identified therefore no intervention performed. Hb ~8, gen surg recommending additional transfusion. 5/13 Total transfusion 7 pRBC, 2 cryo, 2 FFP, 1 plt. Was given additional blood overnight (1 pRBC). Gen surg on board. 5/14 1 unit pRBC during day for episode of hypotension. 1 episode of seizure resolved with propofol push. Vimpat dose adjusted per neurology. Given 2 FFP and 1 plt. 5/15 discussed with neurology possibilty of depakote induced pancreatitis-dose being adjusted. 5/15 no changes, depakote level adjusted, eeg neg.       FAMILY HISTORY:  No pertinent family history in first degree relatives  PAST MEDICAL & SURGICAL HISTORY:  HTN (hypertension)  Aortic dissection  CAD (coronary artery disease)  Seizure disorder  S/P aortic bifurcation bypass graft  S/P CABG x 1        Patient is a 53y old  Male who presents with a chief complaint of endoleak, abdominal pain.      14 system review was unremarkable    Vital signs, hemodynamic and respiratory parameters were reviewed from the bedside nursing flowsheet.  ICU Vital Signs Last 24 Hrs  T(C): 36.6 (16 May 2023 21:24), Max: 37.3 (16 May 2023 01:05)  T(F): 97.9 (16 May 2023 21:24), Max: 99.1 (16 May 2023 01:05)  HR: 138 (16 May 2023 21:00) (98 - 138)  BP: --  BP(mean): --  ABP: 139/86 (16 May 2023 21:00) (106/61 - 150/80)  ABP(mean): 106 (16 May 2023 21:00) (80 - 116)  RR: 32 (16 May 2023 21:00) (12 - 42)  SpO2: 100% (16 May 2023 21:00) (95% - 100%)    O2 Parameters below as of 16 May 2023 20:00  Patient On (Oxygen Delivery Method): ventilator    O2 Concentration (%): 40      Adult Advanced Hemodynamics Last 24 Hrs  CVP(mm Hg): 3 (16 May 2023 21:00) (1 - 308)  CVP(cm H2O): --  CO: --  CI: --  PA: --  PA(mean): --  PCWP: --  SVR: --  SVRI: --  PVR: --  PVRI: --, ABG - ( 16 May 2023 18:34 )  pH, Arterial: 7.45  pH, Blood: x     /  pCO2: 31    /  pO2: 76    / HCO3: 22    / Base Excess: -1.6  /  SaO2: 97.3              Mode: AC/ CMV (Assist Control/ Continuous Mandatory Ventilation)  RR (machine): 12  TV (machine): 500  FiO2: 40  PEEP: 5  ITime: 1  MAP: 17  PIP: 28    Intake and output was reviewed and the fluid balance was calculated  Daily     Daily   I&O's Summary    15 May 2023 07:01  -  16 May 2023 07:00  --------------------------------------------------------  IN: 3115.6 mL / OUT: 1378 mL / NET: 1737.6 mL    16 May 2023 07:01  -  16 May 2023 21:58  --------------------------------------------------------  IN: 1280.2 mL / OUT: 680 mL / NET: 600.2 mL        All lines and drain sites were assessed  Glycemic trend was reviewed    Neuro: sedated  HEENT: ett  Heart: s1 s2  Lungs: clear bl  Abdomen: mildly firm, nt, nd  Extremities: wwp, groin seroma x 2    Lines:  R axillary arterial 5/13  RIJ 5/15      labs  CBC Full  -  ( 16 May 2023 18:28 )  WBC Count : 11.34 K/uL  RBC Count : 4.19 M/uL  Hemoglobin : 12.6 g/dL  Hematocrit : 37.0 %  Platelet Count - Automated : 189 K/uL  Mean Cell Volume : 88.3 fl  Mean Cell Hemoglobin : 30.1 pg  Mean Cell Hemoglobin Concentration : 34.1 gm/dL  Auto Neutrophil # : x  Auto Lymphocyte # : x  Auto Monocyte # : x  Auto Eosinophil # : x  Auto Basophil # : x  Auto Neutrophil % : x  Auto Lymphocyte % : x  Auto Monocyte % : x  Auto Eosinophil % : x  Auto Basophil % : x    05-16    142  |  108  |  57<H>  ----------------------------<  128<H>  3.8   |  21<L>  |  1.54<H>    Ca    9.1      16 May 2023 18:28  Phos  3.0     05-16  Mg     2.2     05-16    TPro  6.2  /  Alb  2.8<L>  /  TBili  1.4<H>  /  DBili  x   /  AST  19  /  ALT  8<L>  /  AlkPhos  101  05-16    PT/INR - ( 16 May 2023 18:28 )   PT: 15.4 sec;   INR: 1.29          PTT - ( 16 May 2023 18:28 )  PTT:32.3 sec  The current medications were reviewed   MEDICATIONS  (STANDING):  albumin human  5% IVPB 250 milliLiter(s) IV Intermittent every 1 hour  aspirin enteric coated 81 milliGRAM(s) Oral daily  chlorhexidine 0.12% Liquid 15 milliLiter(s) Oral Mucosa every 12 hours  chlorhexidine 2% Cloths 1 Application(s) Topical daily  dexMEDEtomidine Infusion 0.2 MICROgram(s)/kG/Hr (3.5 mL/Hr) IV Continuous <Continuous>  lacosamide IVPB 200 milliGRAM(s) IV Intermittent every 12 hours  metoprolol tartrate Injectable 2.5 milliGRAM(s) IV Push every 6 hours  pantoprazole  Injectable 40 milliGRAM(s) IV Push daily  Parenteral Nutrition - Adult 1 Each (50 mL/Hr) TPN Continuous <Continuous>  Parenteral Nutrition - Adult 1 Each (50 mL/Hr) TPN Continuous <Continuous>  propofol Infusion 30 MICROgram(s)/kG/Min (12.6 mL/Hr) IV Continuous <Continuous>  sodium chloride 0.9%. 1000 milliLiter(s) (10 mL/Hr) IV Continuous <Continuous>  valproate sodium  IVPB 500 milliGRAM(s) IV Intermittent every 12 hours    MEDICATIONS  (PRN):  fentaNYL    Injectable 25 MICROGram(s) IV Push every 3 hours PRN Severe Pain (7 - 10)      Assessment/Plan:  53yr old male with PMH HTN, type A dissection sp Dacron grafts and AV resuspension (2013), CAD sp CABG x 1 (Adam, 5/2013, SVG-RCA), seizures, admitted for surgical mgmt of progression of aneurysmal disease. Recent admission 3/20-4/14 during which patient underwent replacement of transverse aortic arch, second stage thoracic endovascular aortic repair, aorto-axillary bypass, AV replacement (bio 23mm), and CABG x 1 (SVG-RCA) EF 75% (Ignacio, 3/20). Post op cb lactic acidosis, severe cardiogenic and vasogenic shock, TREY requiring CVVHD and temporary dialysis requirement. Recent admission 4/27-4/30 for seizure episode, were his AEDS were adjusted. Presented to [facility] ED 5/4 for epigastric pain. CT exam which showed known endoleak for which pt was tx to [facility]. Patient underwent TEVAR with Dr. Pierre 5/5, LD placed prior by [facility]. Arrived intubated on propofol. Given 2 units intraop, 1L IVF, 100cc urine output.    POD11 TEVAR (Ignacio, 5/5)  Pancreatitis cb large hematoma  Sp IR attempt at embolization 5/13, no arterial bleed identified  Hemorrhagic shock-serial cbc  Repeat CT scan if Hb drops  Anti-emetics prn  Acute respiratory failure requiring mechanical ventilation-keep for now  Thrombocytopenia-monitor  TREY-monitor urine output-likely ischemic insult from hemorrhage, improving  Aspirin  Statin  AEDS  Appreciate neuro recs  vEEG-no events, continue while AEDs being adjusted  Replete lytes prn  GI/DVT PPX  TPN  Close hemodynamic, ventilatory and drain monitoring and management per post op routine  Monitor for arrhythmias and monitor parameters for organ perfusion  Beta blockade not administered due to hemodynamic instability and bradycardia  Monitor neurologic status  Head of the bed should remain elevated to 45 deg   Chest PT and IS will be encouraged  Monitor adequacy of oxygenation and ventilation and attempt to wean oxygen  Antibiotic regimen will be tailored to the clinical, laboratory and microbiologic data  Nutritional goals will be met using po eventually, ensure adequate caloric intake and monitor the same  Stress ulcer and VTE prophylaxis will be achieved    Glycemic control is satisfactory  Electrolytes have been repleted as necessary and wound care has been carried out   Pain control has been achieved.   Aggressive physical therapy and early mobility and ambulation goals will be met   The family was updated about the course and plan.    CRITICAL CARE TIME personally provided by me  in evaluation and management, reassessments, review and interpretation of labs and x-rays, ventilator and hemodynamic management, formulating a plan and coordinating care: ___30____ MIN.  Time does not include procedural time.    CTICU ATTENDING     					  Alexx Brooks MD

## 2023-05-16 NOTE — PROGRESS NOTE ADULT - ASSESSMENT
53 year-old male with PMH HTN, type A aortic dissection s/p Dacron grafts, AV resuspension in 2013, CAD s/p CABG x 1 SVG to RCA (5/2013 with Dr. Medina), seizure disorder (last episode on 7/4/22) S/P replacement of transverse aortic arch, second stage thoracic endovascular aortic repair, aorto-axillary bypass, AV replacement (bio 23mm), in APr 2023 and CABG x 1 (SVG-RCA) EF 75% (Ignacio, 3/20) now s/p 2nd state TEVAR on 5/5, and was found to have acute pancreatitis with large peripancreatic/perigastric fluid collections on CTA abdomen on 5/8.     Epilepsy team following for suspected seizure event on 5/14/23 that did not correlate with findings on EEG recordings. Also concerns for drug-related hemorrhagic pancreatitis due to depakote, and hereby the medication has been weaned.       RECOMMENDATIONS:   - Continue vEEG monitoring   - Please decrease depakote 500mg Q12hrs to 250mg Q12hrs on 5/17/23  - Continue Vimpat 200mg Q12hrs   - F/u lacosamide level  - Ativan 2mg IVP for seizures > 2mins  - Keep Mg>2 and K >4.   - Management per primary team.

## 2023-05-16 NOTE — PROGRESS NOTE ADULT - SUBJECTIVE AND OBJECTIVE BOX
INTERVAL HPI/OVERNIGHT EVENTS:    3/20: AVR/arch replacement/CABG x 1/2nd stage TEVAR, aorto axillary bypass  EF 75%    54yo Male current every day marijuana use with a past medical hx of HTN, type A aortic dissection s/p Dacron grafts, AV resuspension in 2013,CAD s/p CABG x 1 SVG to RCA (5/2013 with Dr. Medina), Seizure disorder (last 7/4/22) presents for a follow up visit for evaluation and management of his aortic pathology. Patient is referred by Dr. Jacqueline Gonsales. Screen failed for Triomphe study     CTa C/A/P 11/30/22 - Status post graft repair of the ascending aorta and portion of aortic arch.  Dissecting aneurysm of the descending thoracic aorta (measuring up to 5.7 cm) and abdominal aorta (3.5 cm infrarenal), similar to the MR angiogram of 9/19/2022. Nonocclusive dissection flap present within the innominate artery, aneurysmal right subclavian artery and proximal right common carotid artery as well as aneurysmal left common iliac artery.    Cath (3/9/23): SVG-RCA patent, remaining vessels luminal irregularities. ACCESS: L radial w/ TR band for hemostasis.     to OR 3/20: intraop - 5L Crystalloid/7 units PRBC, 6unit FFP, 3unit platelet, 15unitc cryo, 1000cc FEIBA  arrived to ICU on Epi/Levo  postop - severe acidosis and Renal failure - lasix infusion started; bicarb given   EEG post-op and CT Head for poor mentation    CT Head 3/23: no acute intracranial abnormality   post-op Atrial fib - amiodarone & hypoxemia - bronch for pulm toilet  D10 started for noted hypoglycemia   CVVHD/Aquapharesis and later HD as needed - serial sessions for fluid removal to optimize before extubation     Extubated 2/29 to [facility] -    ultimately discharged -     Presented to [facility] 4/27 for syncope v seizure episode at home   Code stroke called - Imaging obtained unremarkable for cranial pathology - concern for endoleak - Esmolol infusion started and transferred to [facility] 4/27 - pancreatitis reported on imaging   of note - just seen by his neurologist and inc medication dosage recommended based on their exam 4/26  Neuro/Epilepsy consulted - EEG (-); CT spine to assess RUE weakness - negative  mental status clear - no witnessed seizures or change in mental status noted   patient discharged    patient returned 5/5 for planned TEVAR - lumbar drain placement   5/6 - Extubated - possible LE weakness - inc MAP/transfusion support and ongoing lumbar drainage with improvement    LD clamped and patient ambulated     5/8: repeat CTa C/A/P: Diffuse pancreatic enlargement and heterogenous attenuation   predominantly in body and tail, with multiple intrapancreatic and peripancreatic fluid collections with well-defined enhancing wall, new since November 30, 2022, not significantly changed since May 5, 2023 upon directed review. Largest fluid collection along stomach greater curvature   8.5 x 2 x 2 cm, communicates with inferior fluid collection measuring 5 x 3 x 3 cm.  Main pancreatic duct at head/uncinate appears mildly dilated up to 5 mm and is poorly visualized at pancreatic neck body and tail. Right groin subcutaneous tissues most compatible with old hematoma or seroma 4 x 3 cm    5/9: right pigtail placement (700 cc) - apical PTX  (+)delirium reported    5/11 - extreme abd pain reported   urinary retention - coulter replaced - 600cc out  CT Head - no acute intracranial abnormality  CT C/A/P (noncontrast): dec Rt pleural effusion s/p chest tube placement, pigtail coiled in major fissure. Unchanged mod left pleural effusion with unchanged adjacent atelectasis/consolidation.  Limited pancreas evaluation. Unchanged marked fullness pancreatic body/tail with heterogenous attenuation. Stable or slightly decreased extrapancreatic/perigastric fluid collections on this suboptimal noncontrast study. Abbreviated differential includes pseudocyst, walled off necrosis abscess. Recommend follow-up contrast-enhanced study to resolution. Unchanged small moderate abdominopelvic ascites. Unchanged caliber aneurysmal thoracic and abdominal aorta status post endovascular repair.    5/12: drop in Hg 6.6 (+)hemorrhagic shock     repeat CT 5/12:  Interval worsening of the thickening of the pancreatic body and tail with hyperdense components suggesting hemorrhage. Additional cystic changes in the pancreatic head are not well evaluated on this study. Underlying pathology such as inflammation or neoplasm cannot be excluded based on these non contrast images.    intubated and taken to IR for embolization - 4 U pRBC given ; general surgery following - no arterial bleeding noted - no intervention    intercurrent seizure - epilepsy team contacted and meds adjusted ; EEG placed and ongoing     ongoing transfusion support requirement  in last 48 hours given - 11 U pRBC/3 FFP/2 Cryo/2 plt - 2 U pRBC given overnight    Tito titrated off at 9a this am           ICU Vital Signs Last 24 Hrs  T(C): 36.3 (16 May 2023 08:50), Max: 37.8 (15 May 2023 21:00)  T(F): 97.4 (16 May 2023 08:50), Max: 100 (15 May 2023 21:00)  HR: 122 (16 May 2023 11:00) (89 - 124) sinus tach  ABP: 134/79 (16 May 2023 11:00) (106/61 - 150/80)  ABP(mean): 101 (16 May 2023 11:00) (80 - 109)  RR: 12 (16 May 2023 11:00) (12 - 30)  SpO2: 96% (16 May 2023 11:00) (94% - 97%) 50 Fi02    Qtts:     I&O's Summary    15 May 2023 07:01  -  16 May 2023 07:00  --------------------------------------------------------  IN: 3115.6 mL / OUT: 1378 mL / NET: 1737.6 mL    16 May 2023 07:01  -  16 May 2023 11:45  --------------------------------------------------------  IN: 385.5 mL / OUT: 215 mL / NET: 170.5 mL        Mode: AC/ CMV (Assist Control/ Continuous Mandatory Ventilation)  RR (machine): 12  TV (machine): 500  FiO2: 50  PEEP: 7  ITime: 1  MAP: 11  PIP: 17      Physical Exam    Heart  Lungs  Abd  Ext  Chest  Neuro  Skin    LABS:                        11.3   11.85 )-----------( 164      ( 16 May 2023 10:32 )             32.8     05-16    141  |  108  |  57<H>  ----------------------------<  136<H>  3.7   |  22  |  1.71<H>    Ca    8.8      16 May 2023 10:32  Phos  3.2     05-16  Mg     2.2     05-16    TPro  6.0  /  Alb  2.7<L>  /  TBili  1.0  /  DBili  x   /  AST  16  /  ALT  7<L>  /  AlkPhos  85  05-16    PT/INR - ( 16 May 2023 10:32 )   PT: 15.0 sec;   INR: 1.26          PTT - ( 16 May 2023 10:32 )  PTT:31.5 sec    ABG - ( 16 May 2023 10:27 )  pH, Arterial: 7.46  pH, Blood: x     /  pCO2: 32    /  pO2: 76    / HCO3: 23    / Base Excess: -0.3  /  SaO2: 97.9                RADIOLOGY & ADDITIONAL STUDIES:    I have spent/provided stated minutes of critical care time to this patient:  INTERVAL HPI/OVERNIGHT EVENTS:    3/20: AVR/arch replacement/CABG x 1/2nd stage TEVAR, aorto axillary bypass  EF 75%    52yo Male current every day marijuana use with a past medical hx of HTN, type A aortic dissection s/p Dacron grafts, AV resuspension in 2013,CAD s/p CABG x 1 SVG to RCA (5/2013 with Dr. Medina), Seizure disorder (last 7/4/22) presents for a follow up visit for evaluation and management of his aortic pathology. Patient is referred by Dr. Jacqueline Gonsales. Screen failed for Triomphe study     CTa C/A/P 11/30/22 - Status post graft repair of the ascending aorta and portion of aortic arch.  Dissecting aneurysm of the descending thoracic aorta (measuring up to 5.7 cm) and abdominal aorta (3.5 cm infrarenal), similar to the MR angiogram of 9/19/2022. Nonocclusive dissection flap present within the innominate artery, aneurysmal right subclavian artery and proximal right common carotid artery as well as aneurysmal left common iliac artery.    Cath (3/9/23): SVG-RCA patent, remaining vessels luminal irregularities. ACCESS: L radial w/ TR band for hemostasis.     to OR 3/20: intraop - 5L Crystalloid/7 units PRBC, 6unit FFP, 3unit platelet, 15unitc cryo, 1000cc FEIBA  arrived to ICU on Epi/Levo  postop - severe acidosis and Renal failure - lasix infusion started; bicarb given   EEG post-op and CT Head for poor mentation    CT Head 3/23: no acute intracranial abnormality   post-op Atrial fib - amiodarone & hypoxemia - bronch for pulm toilet  D10 started for noted hypoglycemia   CVVHD/Aquapharesis and later HD as needed - serial sessions for fluid removal to optimize before extubation     Extubated 2/29 to [facility] -    ultimately discharged -     Presented to [facility] 4/27 for syncope v seizure episode at home   Code stroke called - Imaging obtained unremarkable for cranial pathology - concern for endoleak - Esmolol infusion started and transferred to [facility] 4/27 - pancreatitis reported on imaging   of note - just seen by his neurologist and inc medication dosage recommended based on their exam 4/26  Neuro/Epilepsy consulted - EEG (-); CT spine to assess RUE weakness - negative  mental status clear - no witnessed seizures or change in mental status noted   patient discharged    patient returned 5/5 for planned TEVAR - lumbar drain placement   5/6 - Extubated - possible LE weakness - inc MAP/transfusion support and ongoing lumbar drainage with improvement    LD clamped and patient ambulated     5/8: repeat CTa C/A/P: Diffuse pancreatic enlargement and heterogenous attenuation   predominantly in body and tail, with multiple intrapancreatic and peripancreatic fluid collections with well-defined enhancing wall, new since November 30, 2022, not significantly changed since May 5, 2023 upon directed review. Largest fluid collection along stomach greater curvature   8.5 x 2 x 2 cm, communicates with inferior fluid collection measuring 5 x 3 x 3 cm.  Main pancreatic duct at head/uncinate appears mildly dilated up to 5 mm and is poorly visualized at pancreatic neck body and tail. Right groin subcutaneous tissues most compatible with old hematoma or seroma 4 x 3 cm    5/9: right pigtail placement (700 cc) - apical PTX  (+)delirium reported    5/11 - extreme abd pain reported   urinary retention - coulter replaced - 600cc out  CT Head - no acute intracranial abnormality  CT C/A/P (noncontrast): dec Rt pleural effusion s/p chest tube placement, pigtail coiled in major fissure. Unchanged mod left pleural effusion with unchanged adjacent atelectasis/consolidation.  Limited pancreas evaluation. Unchanged marked fullness pancreatic body/tail with heterogenous attenuation. Stable or slightly decreased extrapancreatic/perigastric fluid collections on this suboptimal noncontrast study. Abbreviated differential includes pseudocyst, walled off necrosis abscess. Recommend follow-up contrast-enhanced study to resolution. Unchanged small moderate abdominopelvic ascites. Unchanged caliber aneurysmal thoracic and abdominal aorta status post endovascular repair.    5/12: drop in Hg 6.6 (+)hemorrhagic shock     repeat CT 5/12:  Interval worsening of the thickening of the pancreatic body and tail with hyperdense components suggesting hemorrhage. Additional cystic changes in the pancreatic head are not well evaluated on this study. Underlying pathology such as inflammation or neoplasm cannot be excluded based on these non contrast images.    intubated and taken to IR for embolization - 4 U pRBC given ; general surgery following - no arterial bleeding noted - no intervention    intercurrent seizure - epilepsy team contacted and meds adjusted ; EEG placed and ongoing     ongoing transfusion support requirement  in last 48 hours given - 11 U pRBC/3 FFP/2 Cryo/2 plt - 2 U pRBC given overnight    Tito titrated off 5/15    attempted sedation hold to assess neuro status -   serial counts have been stable and no transfusion given for 24 hours - cont to monitor  TPN restarted 5/15    in light of pancreatic risk and bleeding risk - titrating valproate down to off with guidance from epilepsy - no seizure on EEG (ongoing)    no acute events overnight    ICU Vital Signs Last 24 Hrs  T(C): 36.3 (16 May 2023 08:50), Max: 37.8 (15 May 2023 21:00)  T(F): 97.4 (16 May 2023 08:50), Max: 100 (15 May 2023 21:00)  HR: 122 (16 May 2023 11:00) (89 - 124) sinus tach  ABP: 134/79 (16 May 2023 11:00) (106/61 - 150/80)  ABP(mean): 101 (16 May 2023 11:00) (80 - 109)  RR: 12 (16 May 2023 11:00) (12 - 30)  SpO2: 96% (16 May 2023 11:00) (94% - 97%) 50 Fi02    Qtts:   propofol - now off    I&O's Summary    15 May 2023 07:01  -  16 May 2023 07:00  --------------------------------------------------------  IN: 3115.6 mL / OUT: 1378 mL / NET: 1737.6 mL    16 May 2023 07:01  -  16 May 2023 11:45  --------------------------------------------------------  IN: 385.5 mL / OUT: 215 mL / NET: 170.5 mL    Mode: AC/ CMV (Assist Control/ Continuous Mandatory Ventilation)  RR (machine): 12  TV (machine): 500  FiO2: 50  PEEP: 7  ITime: 1  MAP: 11  PIP: 17    Physical Exam    Heart - regular (-)rub/gallop  Lungs - BS appreciated bilaterally - no rhonchi/wheeze  Abd - (+)BS soft NTND (-)r/r/g  Ext - warm to touch; no cyanosis/clubbing  Neuro - opens eyes but now following at this time - cont to assess  Skin - no rash     LABS:                        11.3   11.85 )-----------( 164      ( 16 May 2023 10:32 )             32.8     05-16    141  |  108  |  57<H>  ----------------------------<  136<H>  3.7   |  22  |  1.71<H>    Ca    8.8      16 May 2023 10:32  Phos  3.2     05-16  Mg     2.2     05-16    TPro  6.0  /  Alb  2.7<L>  /  TBili  1.0  /  DBili  x   /  AST  16  /  ALT  7<L>  /  AlkPhos  85  05-16    PT/INR - ( 16 May 2023 10:32 )   PT: 15.0 sec;   INR: 1.26     PTT - ( 16 May 2023 10:32 )  PTT:31.5 sec    ABG - ( 16 May 2023 10:27 )  pH, Arterial: 7.46  pH, Blood: x     /  pCO2: 32    /  pO2: 76    / HCO3: 23    / Base Excess: -0.3  /  SaO2: 97.9      RADIOLOGY & ADDITIONAL STUDIES: reviewed     Patient with complicated medical and surgical Hx with known pancreatitis and seizure disorder with uncontrolled seizures presenting for planned TEVAR 5/5 with some post-op LE weakness - improved with transfusion/inc MAP and lumbar drain - since removed with intercurrent progression of pancreatitis with bleeding/hemorrhagic shock - being followed by surgery; attempted IR - no arterial bleeding/no intervention - prior significant transfusion requirement - no transfusion last 24 hours    1. CV  Hemorrhagic shock - improved   titrated off Tito 5/15  sinus - now sinus tach with sedation off   monitor for ongoing transfusion requirement - serial labs  if need additional products will obtain 3 phase/contrast scan to assess  ASA    2. GI  pancreatitis - known from prior admit   valproate can be associated with pancreatitis - to d/w neuro/epilepsy medications - complicated as patient with known seizure disorder and intermittent seizures on last admit and this admit  Most recent imaging 5/12   (as above)  surgery following  new line placed 5/15 - ongoing TPN    3. Neuro  known seizure do and witnessed seizure on this admit  vimpat now 200 IV q8h; valproate 500 q12h with plan to titrate to 250 5/17  EEG remains in place   titration of meds per Epilepsy team     4. Renal  monitor UO/Lytes and Cr  avoid nephrotoxins as much as able     SCD and GI prophylaxis     d/w family present at bedside/Epilepsy attending/staff and CTS     I have spent/provided stated minutes of critical care time to this patient: 60

## 2023-05-16 NOTE — EEG REPORT - NS EEG TEXT BOX
Daily Updates (from 07:00 am until 07:00 am):  Day 3 5/15-5/16/2023:   Pertinent medications: Depakote 1000/750, LCM increased to 100mg  TID  Background:  continuous, with predominantly alpha/theta frequencies.  Voltage:  Normal (20+ uV)  Organization:  Rudimentary  Posterior Dominant Rhythm:  <7 Hz symmetric, well-organized, and poorly-modulated  Sleep:  Symmetric, synchronous spindles and K complexes.  Focal abnormalities:  No persistent asymmetries of voltage or frequency.  Spontaneous Activity:  No epileptiform discharges  Choose an item. Choose an item.  Symmetry:  Bilaterally symmetric  Generalized Slowing:  Mild to moderate  Events: nonenso  Provocations:  1)	Hyperventilation: was not performed.  2)	Photic stimulation: was not performed.  Impression/clinical correlation:   Abnormal continuous EEG  1)	Mild to moderate generalized slowing suggestive of diffuse or multifocal cerebral dysfunction     Dary Flores MD  Attending Neurologist, [facility]

## 2023-05-16 NOTE — PROGRESS NOTE ADULT - ASSESSMENT
52yo Male pt with PMH HTN, type A aortic dissection s/p Dacron grafts, AV resuspension in 2013, CAD s/p CABG x 1 SVG to RCA (5/2013 with Dr. Medina), seizure disorder (last episode on 7/4/22) S/P replacement of transverse aortic arch, second stage thoracic endovascular aortic repair, aorto-axillary bypass, AV replacement (bio 23mm), in APr 2023 and CABG x 1 (SVG-RCA) EF 75% (Ignacio, 3/20) now s/p 2nd state TEVAR on 5/5 for whom surgery was consulted for finding of acute pancreatitis with large peripancreatic/perigastric fluid collections on CTA abdomen 5/8 then acute hemorrhage starting 5/12/23 requiring 11 U pRBC over last 48 hours but with negative mesenteric angiogram 5/13/23 for whom hepatobiliary surgery was reconsulted for hemorrhagic pancreatitis.    - Continue to trend CBC  - Would obtain Triple Phase CT Abd/Pelv if H&H drops again  Surgery Team 1C will continue to follow. Please page Team 1 with questions/clinical changes. [phone]

## 2023-05-16 NOTE — PROGRESS NOTE ADULT - SUBJECTIVE AND OBJECTIVE BOX
Subjective  No events overnight. No complaints currently.    ROS  As above, otherwise negative for constitutional/HEENT/CV/pulm/GI//MSK/neuro/derm/endocrine/psych.     MEDICATIONS  (STANDING):  aspirin enteric coated 81 milliGRAM(s) Oral daily  chlorhexidine 0.12% Liquid 15 milliLiter(s) Oral Mucosa every 12 hours  chlorhexidine 2% Cloths 1 Application(s) Topical daily  dexMEDEtomidine Infusion 0.2 MICROgram(s)/kG/Hr (3.5 mL/Hr) IV Continuous <Continuous>  lacosamide IVPB 200 milliGRAM(s) IV Intermittent every 12 hours  metoprolol tartrate Injectable 2.5 milliGRAM(s) IV Push every 6 hours  pantoprazole  Injectable 40 milliGRAM(s) IV Push daily  Parenteral Nutrition - Adult 1 Each (50 mL/Hr) TPN Continuous <Continuous>  Parenteral Nutrition - Adult 1 Each (50 mL/Hr) TPN Continuous <Continuous>  sodium chloride 0.9%. 1000 milliLiter(s) (10 mL/Hr) IV Continuous <Continuous>  valproate sodium  IVPB 500 milliGRAM(s) IV Intermittent every 12 hours    MEDICATIONS  (PRN):      T(C): 36.3 (05-16-23 @ 08:50), Max: 37.8 (05-15-23 @ 21:00)  HR: 103 (05-16-23 @ 12:36) (95 - 124)  BP: --  RR: 12 (05-16-23 @ 12:00) (12 - 30)  SpO2: 97% (05-16-23 @ 12:36) (94% - 99%)  Wt(kg): --    Eyes open spontaneously. Conversational with appropriate sentences.  EOMI. Visual fields full. PERRL 3>2. Face symmetrical.  Full strength throughout.  Finger-nose-finger intact R/L.  Intact to light touch throughout.    CBC Full  -  ( 16 May 2023 10:32 )  WBC Count : 11.85 K/uL  RBC Count : 3.76 M/uL  Hemoglobin : 11.3 g/dL  Hematocrit : 32.8 %  Platelet Count - Automated : 164 K/uL  Mean Cell Volume : 87.2 fl  Mean Cell Hemoglobin : 30.1 pg  Mean Cell Hemoglobin Concentration : 34.5 gm/dL  Auto Neutrophil # : x  Auto Lymphocyte # : x  Auto Monocyte # : x  Auto Eosinophil # : x  Auto Basophil # : x  Auto Neutrophil % : x  Auto Lymphocyte % : x  Auto Monocyte % : x  Auto Eosinophil % : x  Auto Basophil % : x    05-16    141  |  108  |  57<H>  ----------------------------<  136<H>  3.7   |  22  |  1.71<H>    Ca    8.8      16 May 2023 10:32  Phos  3.2     05-16  Mg     2.2     05-16    TPro  6.0  /  Alb  2.7<L>  /  TBili  1.0  /  DBili  x   /  AST  16  /  ALT  7<L>  /  AlkPhos  85  05-16    LIVER FUNCTIONS - ( 16 May 2023 10:32 )  Alb: 2.7 g/dL / Pro: 6.0 g/dL / ALK PHOS: 85 U/L / ALT: 7 U/L / AST: 16 U/L / GGT: x           PT/INR - ( 16 May 2023 10:32 )   PT: 15.0 sec;   INR: 1.26          PTT - ( 16 May 2023 10:32 )  PTT:31.5 sec      EEG: EPILEPSY PROGRESS NOTE:   Patient seen and examined at bedside, remains intubated, but off sedation since 8am. There were no report of seizure-like events overnight.     REVIEW OF SYSTEMS:   Unobtainable 2/2 intubation     MEDICATIONS  (STANDING):  aspirin enteric coated 81 milliGRAM(s) Oral daily  chlorhexidine 0.12% Liquid 15 milliLiter(s) Oral Mucosa every 12 hours  chlorhexidine 2% Cloths 1 Application(s) Topical daily  dexMEDEtomidine Infusion 0.2 MICROgram(s)/kG/Hr (3.5 mL/Hr) IV Continuous <Continuous>  lacosamide IVPB 200 milliGRAM(s) IV Intermittent every 12 hours  metoprolol tartrate Injectable 2.5 milliGRAM(s) IV Push every 6 hours  pantoprazole  Injectable 40 milliGRAM(s) IV Push daily  Parenteral Nutrition - Adult 1 Each (50 mL/Hr) TPN Continuous <Continuous>  Parenteral Nutrition - Adult 1 Each (50 mL/Hr) TPN Continuous <Continuous>  sodium chloride 0.9%. 1000 milliLiter(s) (10 mL/Hr) IV Continuous <Continuous>  valproate sodium  IVPB 500 milliGRAM(s) IV Intermittent every 12 hours    VITAL SIGNS:   T(C): 36.3 (05-16-23 @ 08:50), Max: 37.8 (05-15-23 @ 21:00)  HR: 103 (05-16-23 @ 12:36) (95 - 124)  BP: --  RR: 12 (05-16-23 @ 12:00) (12 - 30)  SpO2: 97% (05-16-23 @ 12:36) (94% - 99%)  Wt(kg): --    PHYSICAL EXAM:   Intubated and off sedation. PERRLA 2mm brisk    LABS:  CBC Full  -  ( 16 May 2023 10:32 )  WBC Count : 11.85 K/uL  RBC Count : 3.76 M/uL  Hemoglobin : 11.3 g/dL  Hematocrit : 32.8 %  Platelet Count - Automated : 164 K/uL  Mean Cell Volume : 87.2 fl  Mean Cell Hemoglobin : 30.1 pg  Mean Cell Hemoglobin Concentration : 34.5 gm/dL  Auto Neutrophil # : x  Auto Lymphocyte # : x  Auto Monocyte # : x  Auto Eosinophil # : x  Auto Basophil # : x  Auto Neutrophil % : x  Auto Lymphocyte % : x  Auto Monocyte % : x  Auto Eosinophil % : x  Auto Basophil % : x    05-16    141  |  108  |  57<H>  ----------------------------<  136<H>  3.7   |  22  |  1.71<H>    Ca    8.8      16 May 2023 10:32  Phos  3.2     05-16  Mg     2.2     05-16    TPro  6.0  /  Alb  2.7<L>  /  TBili  1.0  /  DBili  x   /  AST  16  /  ALT  7<L>  /  AlkPhos  85  05-16    LIVER FUNCTIONS - ( 16 May 2023 10:32 )  Alb: 2.7 g/dL / Pro: 6.0 g/dL / ALK PHOS: 85 U/L / ALT: 7 U/L / AST: 16 U/L / GGT: x           PT/INR - ( 16 May 2023 10:32 )   PT: 15.0 sec;   INR: 1.26     PTT - ( 16 May 2023 10:32 )  PTT:31.5 sec       EPILEPSY PROGRESS NOTE:   Patient seen and examined at bedside, remains intubated, but off sedation since 8am. There were no report of seizure-like events overnight.     REVIEW OF SYSTEMS:   Unobtainable 2/2 intubation     MEDICATIONS  (STANDING):  aspirin enteric coated 81 milliGRAM(s) Oral daily  chlorhexidine 0.12% Liquid 15 milliLiter(s) Oral Mucosa every 12 hours  chlorhexidine 2% Cloths 1 Application(s) Topical daily  dexMEDEtomidine Infusion 0.2 MICROgram(s)/kG/Hr (3.5 mL/Hr) IV Continuous <Continuous>  lacosamide IVPB 200 milliGRAM(s) IV Intermittent every 12 hours  metoprolol tartrate Injectable 2.5 milliGRAM(s) IV Push every 6 hours  pantoprazole  Injectable 40 milliGRAM(s) IV Push daily  Parenteral Nutrition - Adult 1 Each (50 mL/Hr) TPN Continuous <Continuous>  Parenteral Nutrition - Adult 1 Each (50 mL/Hr) TPN Continuous <Continuous>  sodium chloride 0.9%. 1000 milliLiter(s) (10 mL/Hr) IV Continuous <Continuous>  valproate sodium  IVPB 500 milliGRAM(s) IV Intermittent every 12 hours    VITAL SIGNS:   T(C): 36.3 (05-16-23 @ 08:50), Max: 37.8 (05-15-23 @ 21:00)  HR: 103 (05-16-23 @ 12:36) (95 - 124)  BP: --  RR: 12 (05-16-23 @ 12:00) (12 - 30)  SpO2: 97% (05-16-23 @ 12:36) (94% - 99%)  Wt(kg): --    PHYSICAL EXAM:   Intubated and off sedation. PERRLA 2mm brisk. Minimal attempt to open eyes bilaterally, +cough and gag reflex. Facial grimacing noted w/ painful stimuli to all limbs, no motor activity noted.     LABS:  CBC Full  -  ( 16 May 2023 10:32 )  WBC Count : 11.85 K/uL  RBC Count : 3.76 M/uL  Hemoglobin : 11.3 g/dL  Hematocrit : 32.8 %  Platelet Count - Automated : 164 K/uL  Mean Cell Volume : 87.2 fl  Mean Cell Hemoglobin : 30.1 pg  Mean Cell Hemoglobin Concentration : 34.5 gm/dL  Auto Neutrophil # : x  Auto Lymphocyte # : x  Auto Monocyte # : x  Auto Eosinophil # : x  Auto Basophil # : x  Auto Neutrophil % : x  Auto Lymphocyte % : x  Auto Monocyte % : x  Auto Eosinophil % : x  Auto Basophil % : x    05-16    141  |  108  |  57<H>  ----------------------------<  136<H>  3.7   |  22  |  1.71<H>    Ca    8.8      16 May 2023 10:32  Phos  3.2     05-16  Mg     2.2     05-16    TPro  6.0  /  Alb  2.7<L>  /  TBili  1.0  /  DBili  x   /  AST  16  /  ALT  7<L>  /  AlkPhos  85  05-16    LIVER FUNCTIONS - ( 16 May 2023 10:32 )  Alb: 2.7 g/dL / Pro: 6.0 g/dL / ALK PHOS: 85 U/L / ALT: 7 U/L / AST: 16 U/L / GGT: x           PT/INR - ( 16 May 2023 10:32 )   PT: 15.0 sec;   INR: 1.26     PTT - ( 16 May 2023 10:32 )  PTT:31.5 sec

## 2023-05-17 LAB
ALBUMIN SERPL ELPH-MCNC: 2.7 G/DL — LOW (ref 3.3–5)
ALBUMIN SERPL ELPH-MCNC: 2.8 G/DL — LOW (ref 3.3–5)
ALBUMIN SERPL ELPH-MCNC: 2.9 G/DL — LOW (ref 3.3–5)
ALP SERPL-CCNC: 70 U/L — SIGNIFICANT CHANGE UP (ref 40–120)
ALP SERPL-CCNC: 72 U/L — SIGNIFICANT CHANGE UP (ref 40–120)
ALP SERPL-CCNC: 89 U/L — SIGNIFICANT CHANGE UP (ref 40–120)
ALT FLD-CCNC: 5 U/L — LOW (ref 10–45)
ALT FLD-CCNC: 6 U/L — LOW (ref 10–45)
ALT FLD-CCNC: <5 U/L — LOW (ref 10–45)
ANION GAP SERPL CALC-SCNC: 10 MMOL/L — SIGNIFICANT CHANGE UP (ref 5–17)
ANION GAP SERPL CALC-SCNC: 11 MMOL/L — SIGNIFICANT CHANGE UP (ref 5–17)
ANION GAP SERPL CALC-SCNC: 9 MMOL/L — SIGNIFICANT CHANGE UP (ref 5–17)
APPEARANCE UR: CLEAR — SIGNIFICANT CHANGE UP
APTT BLD: 32.8 SEC — SIGNIFICANT CHANGE UP (ref 27.5–35.5)
APTT BLD: 33.2 SEC — SIGNIFICANT CHANGE UP (ref 27.5–35.5)
APTT BLD: 34.4 SEC — SIGNIFICANT CHANGE UP (ref 27.5–35.5)
AST SERPL-CCNC: 14 U/L — SIGNIFICANT CHANGE UP (ref 10–40)
AST SERPL-CCNC: 15 U/L — SIGNIFICANT CHANGE UP (ref 10–40)
AST SERPL-CCNC: 18 U/L — SIGNIFICANT CHANGE UP (ref 10–40)
B-OH-BUTYR SERPL-SCNC: 0.1 MMOL/L — SIGNIFICANT CHANGE UP
BACTERIA # UR AUTO: PRESENT /HPF
BILIRUB SERPL-MCNC: 1.2 MG/DL — SIGNIFICANT CHANGE UP (ref 0.2–1.2)
BILIRUB SERPL-MCNC: 1.3 MG/DL — HIGH (ref 0.2–1.2)
BILIRUB SERPL-MCNC: 1.7 MG/DL — HIGH (ref 0.2–1.2)
BILIRUB UR-MCNC: NEGATIVE — SIGNIFICANT CHANGE UP
BUN SERPL-MCNC: 55 MG/DL — HIGH (ref 7–23)
BUN SERPL-MCNC: 56 MG/DL — HIGH (ref 7–23)
BUN SERPL-MCNC: 60 MG/DL — HIGH (ref 7–23)
CALCIUM SERPL-MCNC: 8.4 MG/DL — SIGNIFICANT CHANGE UP (ref 8.4–10.5)
CALCIUM SERPL-MCNC: 8.6 MG/DL — SIGNIFICANT CHANGE UP (ref 8.4–10.5)
CALCIUM SERPL-MCNC: 8.6 MG/DL — SIGNIFICANT CHANGE UP (ref 8.4–10.5)
CHLORIDE SERPL-SCNC: 109 MMOL/L — HIGH (ref 96–108)
CHLORIDE SERPL-SCNC: 110 MMOL/L — HIGH (ref 96–108)
CHLORIDE SERPL-SCNC: 113 MMOL/L — HIGH (ref 96–108)
CO2 SERPL-SCNC: 19 MMOL/L — LOW (ref 22–31)
CO2 SERPL-SCNC: 22 MMOL/L — SIGNIFICANT CHANGE UP (ref 22–31)
CO2 SERPL-SCNC: 23 MMOL/L — SIGNIFICANT CHANGE UP (ref 22–31)
COLOR SPEC: YELLOW — SIGNIFICANT CHANGE UP
CREAT SERPL-MCNC: 1.2 MG/DL — SIGNIFICANT CHANGE UP (ref 0.5–1.3)
CREAT SERPL-MCNC: 1.39 MG/DL — HIGH (ref 0.5–1.3)
CREAT SERPL-MCNC: 1.5 MG/DL — HIGH (ref 0.5–1.3)
DIFF PNL FLD: NEGATIVE — SIGNIFICANT CHANGE UP
EGFR: 55 ML/MIN/1.73M2 — LOW
EGFR: 60 ML/MIN/1.73M2 — SIGNIFICANT CHANGE UP
EGFR: 72 ML/MIN/1.73M2 — SIGNIFICANT CHANGE UP
EPI CELLS # UR: SIGNIFICANT CHANGE UP /HPF (ref 0–5)
GAS PNL BLDA: SIGNIFICANT CHANGE UP
GLUCOSE SERPL-MCNC: 116 MG/DL — HIGH (ref 70–99)
GLUCOSE SERPL-MCNC: 124 MG/DL — HIGH (ref 70–99)
GLUCOSE SERPL-MCNC: 124 MG/DL — HIGH (ref 70–99)
GLUCOSE UR QL: NEGATIVE — SIGNIFICANT CHANGE UP
GRAM STN FLD: SIGNIFICANT CHANGE UP
GRAN CASTS # UR COMP ASSIST: ABNORMAL /LPF
HCT VFR BLD CALC: 28.2 % — LOW (ref 39–50)
HCT VFR BLD CALC: 30.3 % — LOW (ref 39–50)
HCT VFR BLD CALC: 34.8 % — LOW (ref 39–50)
HGB BLD-MCNC: 10.4 G/DL — LOW (ref 13–17)
HGB BLD-MCNC: 11.4 G/DL — LOW (ref 13–17)
HGB BLD-MCNC: 9.7 G/DL — LOW (ref 13–17)
HYALINE CASTS # UR AUTO: SIGNIFICANT CHANGE UP /LPF (ref 0–2)
INR BLD: 1.36 — HIGH (ref 0.88–1.16)
INR BLD: 1.41 — HIGH (ref 0.88–1.16)
INR BLD: 1.42 — HIGH (ref 0.88–1.16)
KETONES UR-MCNC: NEGATIVE — SIGNIFICANT CHANGE UP
LACOSAMIDE (VIMPAT) RESULT: 13.15 UG/ML — HIGH (ref 1–10)
LACTATE SERPL-SCNC: 1.5 MMOL/L — SIGNIFICANT CHANGE UP (ref 0.5–2)
LEUKOCYTE ESTERASE UR-ACNC: NEGATIVE — SIGNIFICANT CHANGE UP
MAGNESIUM SERPL-MCNC: 1.8 MG/DL — SIGNIFICANT CHANGE UP (ref 1.6–2.6)
MAGNESIUM SERPL-MCNC: 2 MG/DL — SIGNIFICANT CHANGE UP (ref 1.6–2.6)
MAGNESIUM SERPL-MCNC: 2.1 MG/DL — SIGNIFICANT CHANGE UP (ref 1.6–2.6)
MCHC RBC-ENTMCNC: 29.8 PG — SIGNIFICANT CHANGE UP (ref 27–34)
MCHC RBC-ENTMCNC: 30.4 PG — SIGNIFICANT CHANGE UP (ref 27–34)
MCHC RBC-ENTMCNC: 30.5 PG — SIGNIFICANT CHANGE UP (ref 27–34)
MCHC RBC-ENTMCNC: 32.8 GM/DL — SIGNIFICANT CHANGE UP (ref 32–36)
MCHC RBC-ENTMCNC: 34.3 GM/DL — SIGNIFICANT CHANGE UP (ref 32–36)
MCHC RBC-ENTMCNC: 34.4 GM/DL — SIGNIFICANT CHANGE UP (ref 32–36)
MCV RBC AUTO: 88.4 FL — SIGNIFICANT CHANGE UP (ref 80–100)
MCV RBC AUTO: 88.9 FL — SIGNIFICANT CHANGE UP (ref 80–100)
MCV RBC AUTO: 90.9 FL — SIGNIFICANT CHANGE UP (ref 80–100)
NITRITE UR-MCNC: NEGATIVE — SIGNIFICANT CHANGE UP
NRBC # BLD: 0 /100 WBCS — SIGNIFICANT CHANGE UP (ref 0–0)
PH UR: 6.5 — SIGNIFICANT CHANGE UP (ref 5–8)
PHOSPHATE SERPL-MCNC: 2.5 MG/DL — SIGNIFICANT CHANGE UP (ref 2.5–4.5)
PHOSPHATE SERPL-MCNC: 2.8 MG/DL — SIGNIFICANT CHANGE UP (ref 2.5–4.5)
PHOSPHATE SERPL-MCNC: 2.8 MG/DL — SIGNIFICANT CHANGE UP (ref 2.5–4.5)
PLATELET # BLD AUTO: 142 K/UL — LOW (ref 150–400)
PLATELET # BLD AUTO: 146 K/UL — LOW (ref 150–400)
PLATELET # BLD AUTO: 175 K/UL — SIGNIFICANT CHANGE UP (ref 150–400)
POTASSIUM SERPL-MCNC: 3.5 MMOL/L — SIGNIFICANT CHANGE UP (ref 3.5–5.3)
POTASSIUM SERPL-MCNC: 3.5 MMOL/L — SIGNIFICANT CHANGE UP (ref 3.5–5.3)
POTASSIUM SERPL-MCNC: 3.8 MMOL/L — SIGNIFICANT CHANGE UP (ref 3.5–5.3)
POTASSIUM SERPL-SCNC: 3.5 MMOL/L — SIGNIFICANT CHANGE UP (ref 3.5–5.3)
POTASSIUM SERPL-SCNC: 3.5 MMOL/L — SIGNIFICANT CHANGE UP (ref 3.5–5.3)
POTASSIUM SERPL-SCNC: 3.8 MMOL/L — SIGNIFICANT CHANGE UP (ref 3.5–5.3)
PROT SERPL-MCNC: 5.4 G/DL — LOW (ref 6–8.3)
PROT SERPL-MCNC: 5.6 G/DL — LOW (ref 6–8.3)
PROT SERPL-MCNC: 5.7 G/DL — LOW (ref 6–8.3)
PROT UR-MCNC: 100 MG/DL
PROTHROM AB SERPL-ACNC: 16.2 SEC — HIGH (ref 10.5–13.4)
PROTHROM AB SERPL-ACNC: 16.8 SEC — HIGH (ref 10.5–13.4)
PROTHROM AB SERPL-ACNC: 17 SEC — HIGH (ref 10.5–13.4)
RBC # BLD: 3.19 M/UL — LOW (ref 4.2–5.8)
RBC # BLD: 3.41 M/UL — LOW (ref 4.2–5.8)
RBC # BLD: 3.83 M/UL — LOW (ref 4.2–5.8)
RBC # FLD: 16.1 % — HIGH (ref 10.3–14.5)
RBC # FLD: 16.6 % — HIGH (ref 10.3–14.5)
RBC # FLD: 16.6 % — HIGH (ref 10.3–14.5)
RBC CASTS # UR COMP ASSIST: ABNORMAL /HPF
SODIUM SERPL-SCNC: 139 MMOL/L — SIGNIFICANT CHANGE UP (ref 135–145)
SODIUM SERPL-SCNC: 143 MMOL/L — SIGNIFICANT CHANGE UP (ref 135–145)
SODIUM SERPL-SCNC: 144 MMOL/L — SIGNIFICANT CHANGE UP (ref 135–145)
SP GR SPEC: 1.02 — SIGNIFICANT CHANGE UP (ref 1–1.03)
SPECIMEN SOURCE: SIGNIFICANT CHANGE UP
UROBILINOGEN FLD QL: 0.2 E.U./DL — SIGNIFICANT CHANGE UP
WBC # BLD: 4.76 K/UL — SIGNIFICANT CHANGE UP (ref 3.8–10.5)
WBC # BLD: 9.57 K/UL — SIGNIFICANT CHANGE UP (ref 3.8–10.5)
WBC # BLD: 9.87 K/UL — SIGNIFICANT CHANGE UP (ref 3.8–10.5)
WBC # FLD AUTO: 4.76 K/UL — SIGNIFICANT CHANGE UP (ref 3.8–10.5)
WBC # FLD AUTO: 9.57 K/UL — SIGNIFICANT CHANGE UP (ref 3.8–10.5)
WBC # FLD AUTO: 9.87 K/UL — SIGNIFICANT CHANGE UP (ref 3.8–10.5)
WBC UR QL: < 5 /HPF — SIGNIFICANT CHANGE UP

## 2023-05-17 PROCEDURE — 99231 SBSQ HOSP IP/OBS SF/LOW 25: CPT | Mod: GC

## 2023-05-17 PROCEDURE — 99291 CRITICAL CARE FIRST HOUR: CPT

## 2023-05-17 RX ORDER — VANCOMYCIN HCL 1 G
1000 VIAL (EA) INTRAVENOUS EVERY 12 HOURS
Refills: 0 | Status: DISCONTINUED | OUTPATIENT
Start: 2023-05-17 | End: 2023-05-20

## 2023-05-17 RX ORDER — ACETAMINOPHEN 500 MG
1000 TABLET ORAL ONCE
Refills: 0 | Status: COMPLETED | OUTPATIENT
Start: 2023-05-17 | End: 2023-05-17

## 2023-05-17 RX ORDER — POTASSIUM CHLORIDE 20 MEQ
20 PACKET (EA) ORAL
Refills: 0 | Status: COMPLETED | OUTPATIENT
Start: 2023-05-17 | End: 2023-05-18

## 2023-05-17 RX ORDER — VASOPRESSIN 20 [USP'U]/ML
0.06 INJECTION INTRAVENOUS
Qty: 40 | Refills: 0 | Status: DISCONTINUED | OUTPATIENT
Start: 2023-05-17 | End: 2023-05-17

## 2023-05-17 RX ORDER — FENTANYL CITRATE 50 UG/ML
25 INJECTION INTRAVENOUS ONCE
Refills: 0 | Status: DISCONTINUED | OUTPATIENT
Start: 2023-05-17 | End: 2023-05-17

## 2023-05-17 RX ORDER — BUMETANIDE 0.25 MG/ML
2 INJECTION INTRAMUSCULAR; INTRAVENOUS ONCE
Refills: 0 | Status: DISCONTINUED | OUTPATIENT
Start: 2023-05-17 | End: 2023-05-17

## 2023-05-17 RX ORDER — LABETALOL HCL 100 MG
10 TABLET ORAL ONCE
Refills: 0 | Status: COMPLETED | OUTPATIENT
Start: 2023-05-17 | End: 2023-05-17

## 2023-05-17 RX ORDER — FENTANYL CITRATE 50 UG/ML
0.5 INJECTION INTRAVENOUS
Qty: 2500 | Refills: 0 | Status: DISCONTINUED | OUTPATIENT
Start: 2023-05-17 | End: 2023-05-18

## 2023-05-17 RX ORDER — ALBUMIN HUMAN 25 %
250 VIAL (ML) INTRAVENOUS ONCE
Refills: 0 | Status: COMPLETED | OUTPATIENT
Start: 2023-05-17 | End: 2023-05-17

## 2023-05-17 RX ORDER — ASPIRIN/CALCIUM CARB/MAGNESIUM 324 MG
81 TABLET ORAL DAILY
Refills: 0 | Status: DISCONTINUED | OUTPATIENT
Start: 2023-05-17 | End: 2023-05-17

## 2023-05-17 RX ORDER — PHENYLEPHRINE HYDROCHLORIDE 10 MG/ML
0.3 INJECTION INTRAVENOUS
Qty: 40 | Refills: 0 | Status: DISCONTINUED | OUTPATIENT
Start: 2023-05-17 | End: 2023-05-17

## 2023-05-17 RX ORDER — POTASSIUM CHLORIDE 20 MEQ
20 PACKET (EA) ORAL
Refills: 0 | Status: COMPLETED | OUTPATIENT
Start: 2023-05-17 | End: 2023-05-17

## 2023-05-17 RX ORDER — SODIUM CHLORIDE 9 MG/ML
1000 INJECTION INTRAMUSCULAR; INTRAVENOUS; SUBCUTANEOUS ONCE
Refills: 0 | Status: COMPLETED | OUTPATIENT
Start: 2023-05-17 | End: 2023-05-17

## 2023-05-17 RX ORDER — CISATRACURIUM BESYLATE 2 MG/ML
10 INJECTION INTRAVENOUS ONCE
Refills: 0 | Status: COMPLETED | OUTPATIENT
Start: 2023-05-17 | End: 2023-05-17

## 2023-05-17 RX ORDER — POTASSIUM CHLORIDE 20 MEQ
20 PACKET (EA) ORAL ONCE
Refills: 0 | Status: COMPLETED | OUTPATIENT
Start: 2023-05-17 | End: 2023-05-17

## 2023-05-17 RX ORDER — CEFEPIME 1 G/1
2000 INJECTION, POWDER, FOR SOLUTION INTRAMUSCULAR; INTRAVENOUS EVERY 12 HOURS
Refills: 0 | Status: DISCONTINUED | OUTPATIENT
Start: 2023-05-17 | End: 2023-05-18

## 2023-05-17 RX ORDER — CEFEPIME 1 G/1
INJECTION, POWDER, FOR SOLUTION INTRAMUSCULAR; INTRAVENOUS
Refills: 0 | Status: DISCONTINUED | OUTPATIENT
Start: 2023-05-17 | End: 2023-05-18

## 2023-05-17 RX ORDER — MAGNESIUM SULFATE 500 MG/ML
2 VIAL (ML) INJECTION ONCE
Refills: 0 | Status: COMPLETED | OUTPATIENT
Start: 2023-05-17 | End: 2023-05-17

## 2023-05-17 RX ORDER — CEFEPIME 1 G/1
2000 INJECTION, POWDER, FOR SOLUTION INTRAMUSCULAR; INTRAVENOUS ONCE
Refills: 0 | Status: COMPLETED | OUTPATIENT
Start: 2023-05-17 | End: 2023-05-17

## 2023-05-17 RX ORDER — ELECTROLYTE SOLUTION,INJ
1 VIAL (ML) INTRAVENOUS
Refills: 0 | Status: DISCONTINUED | OUTPATIENT
Start: 2023-05-17 | End: 2023-05-17

## 2023-05-17 RX ORDER — CASPOFUNGIN ACETATE 7 MG/ML
70 INJECTION, POWDER, LYOPHILIZED, FOR SOLUTION INTRAVENOUS ONCE
Refills: 0 | Status: COMPLETED | OUTPATIENT
Start: 2023-05-17 | End: 2023-05-18

## 2023-05-17 RX ORDER — BUMETANIDE 0.25 MG/ML
2 INJECTION INTRAMUSCULAR; INTRAVENOUS ONCE
Refills: 0 | Status: COMPLETED | OUTPATIENT
Start: 2023-05-17 | End: 2023-05-17

## 2023-05-17 RX ORDER — VALPROIC ACID (AS SODIUM SALT) 250 MG/5ML
250 SOLUTION, ORAL ORAL ONCE
Refills: 0 | Status: COMPLETED | OUTPATIENT
Start: 2023-05-17 | End: 2023-05-17

## 2023-05-17 RX ORDER — ASPIRIN/CALCIUM CARB/MAGNESIUM 324 MG
81 TABLET ORAL DAILY
Refills: 0 | Status: DISCONTINUED | OUTPATIENT
Start: 2023-05-17 | End: 2023-05-31

## 2023-05-17 RX ADMIN — PANTOPRAZOLE SODIUM 40 MILLIGRAM(S): 20 TABLET, DELAYED RELEASE ORAL at 11:31

## 2023-05-17 RX ADMIN — Medication 125 MILLILITER(S): at 17:13

## 2023-05-17 RX ADMIN — VASOPRESSIN 9 UNIT(S)/MIN: 20 INJECTION INTRAVENOUS at 10:25

## 2023-05-17 RX ADMIN — Medication 50 MILLIEQUIVALENT(S): at 12:26

## 2023-05-17 RX ADMIN — Medication 10 MILLIGRAM(S): at 18:32

## 2023-05-17 RX ADMIN — Medication 125 MILLILITER(S): at 11:14

## 2023-05-17 RX ADMIN — Medication 400 MILLIGRAM(S): at 18:25

## 2023-05-17 RX ADMIN — FENTANYL CITRATE 25 MICROGRAM(S): 50 INJECTION INTRAVENOUS at 21:34

## 2023-05-17 RX ADMIN — PROPOFOL 12.6 MICROGRAM(S)/KG/MIN: 10 INJECTION, EMULSION INTRAVENOUS at 13:33

## 2023-05-17 RX ADMIN — FENTANYL CITRATE 25 MICROGRAM(S): 50 INJECTION INTRAVENOUS at 16:45

## 2023-05-17 RX ADMIN — Medication 100 MILLIEQUIVALENT(S): at 05:00

## 2023-05-17 RX ADMIN — FENTANYL CITRATE 25 MICROGRAM(S): 50 INJECTION INTRAVENOUS at 19:15

## 2023-05-17 RX ADMIN — Medication 2 MILLIGRAM(S): at 22:30

## 2023-05-17 RX ADMIN — SODIUM CHLORIDE 1000 MILLILITER(S): 9 INJECTION INTRAMUSCULAR; INTRAVENOUS; SUBCUTANEOUS at 07:29

## 2023-05-17 RX ADMIN — Medication 55 MILLIGRAM(S): at 05:51

## 2023-05-17 RX ADMIN — CHLORHEXIDINE GLUCONATE 1 APPLICATION(S): 213 SOLUTION TOPICAL at 05:03

## 2023-05-17 RX ADMIN — Medication 250 MILLIGRAM(S): at 11:25

## 2023-05-17 RX ADMIN — Medication 1 EACH: at 17:24

## 2023-05-17 RX ADMIN — CISATRACURIUM BESYLATE 10 MILLIGRAM(S): 2 INJECTION INTRAVENOUS at 23:00

## 2023-05-17 RX ADMIN — PROPOFOL 12.6 MICROGRAM(S)/KG/MIN: 10 INJECTION, EMULSION INTRAVENOUS at 22:37

## 2023-05-17 RX ADMIN — PROPOFOL 12.6 MICROGRAM(S)/KG/MIN: 10 INJECTION, EMULSION INTRAVENOUS at 01:01

## 2023-05-17 RX ADMIN — Medication 100 MILLIEQUIVALENT(S): at 22:38

## 2023-05-17 RX ADMIN — CHLORHEXIDINE GLUCONATE 15 MILLILITER(S): 213 SOLUTION TOPICAL at 18:19

## 2023-05-17 RX ADMIN — FENTANYL CITRATE 25 MICROGRAM(S): 50 INJECTION INTRAVENOUS at 21:19

## 2023-05-17 RX ADMIN — Medication 1000 MILLIGRAM(S): at 18:40

## 2023-05-17 RX ADMIN — Medication 125 MILLILITER(S): at 10:05

## 2023-05-17 RX ADMIN — PROPOFOL 12.6 MICROGRAM(S)/KG/MIN: 10 INJECTION, EMULSION INTRAVENOUS at 17:34

## 2023-05-17 RX ADMIN — Medication 10 MILLIGRAM(S): at 23:15

## 2023-05-17 RX ADMIN — Medication 100 MILLIEQUIVALENT(S): at 05:55

## 2023-05-17 RX ADMIN — Medication 250 MILLILITER(S): at 00:09

## 2023-05-17 RX ADMIN — Medication 25 GRAM(S): at 22:38

## 2023-05-17 RX ADMIN — Medication 2.5 MILLIGRAM(S): at 05:04

## 2023-05-17 RX ADMIN — DEXMEDETOMIDINE HYDROCHLORIDE IN 0.9% SODIUM CHLORIDE 3.5 MICROGRAM(S)/KG/HR: 4 INJECTION INTRAVENOUS at 00:08

## 2023-05-17 RX ADMIN — BUMETANIDE 2 MILLIGRAM(S): 0.25 INJECTION INTRAMUSCULAR; INTRAVENOUS at 21:06

## 2023-05-17 RX ADMIN — Medication 52.5 MILLIGRAM(S): at 17:32

## 2023-05-17 RX ADMIN — LACOSAMIDE 140 MILLIGRAM(S): 50 TABLET ORAL at 11:10

## 2023-05-17 RX ADMIN — CEFEPIME 100 MILLIGRAM(S): 1 INJECTION, POWDER, FOR SOLUTION INTRAMUSCULAR; INTRAVENOUS at 12:46

## 2023-05-17 RX ADMIN — CHLORHEXIDINE GLUCONATE 15 MILLILITER(S): 213 SOLUTION TOPICAL at 05:04

## 2023-05-17 RX ADMIN — FENTANYL CITRATE 25 MICROGRAM(S): 50 INJECTION INTRAVENOUS at 16:31

## 2023-05-17 RX ADMIN — FENTANYL CITRATE 25 MICROGRAM(S): 50 INJECTION INTRAVENOUS at 19:03

## 2023-05-17 RX ADMIN — Medication 81 MILLIGRAM(S): at 12:26

## 2023-05-17 RX ADMIN — Medication 100 MILLIEQUIVALENT(S): at 23:27

## 2023-05-17 RX ADMIN — LACOSAMIDE 140 MILLIGRAM(S): 50 TABLET ORAL at 22:30

## 2023-05-17 NOTE — PROGRESS NOTE ADULT - ASSESSMENT
52 M w/ PMHx of HTN, type A aortic dissection s/p Dacron grafts, AV resuspension in 2013, CAD s/p CABG x 1 SVG to RCA (5/2013 with Dr. Medina), seizure disorder who was admitted for surgical mgmt of progression of aneurysmal disease. Recent admission 3/20-4/14 during which patient underwent replacement of transverse aortic arch, second stage TEVAR and aorto-axillary bypass, AV replacement (bio 23mm), and CABG x 1 (SVG-RCA) EF 75% (Ignacio, 3/20). Post op cb severe cardiogenic and vasogenic shock, TREY requiring CVVHD and temporary dialysis. Camr off RRT and discharged home mid April.presented to [facility] ED 4/27 for syncope vs seizure episode at home. negative neuro work up for Seizures AED dose adjusted however imaging at that rime revealed recent graft endoleak, Admitted now for surgical mgmt.   S/p Second stage thoracic endovascular aortic repair  5/5 transient LE weakness, Improved   LD clamped and Dc's 5/8  worsening leukocytosis- CT C/A/P suggestive of acute pancreatitis with 2 fluid collections / Lipase on admission > 1000 down to 95 5/8   dropping H&H and LUQ hematoma suggestive of hemorrhagic pancreatitis   A/p  No recent seizures, valproate titrated off given association with pancreatitis/ Vimpat dose increased to 200 Mg BID/EEG in place  Also deeply sedated on high dose propofol   Febrile, tachycardic and tachypneic with hypoxia   Required increasing sedation and paralysis for better vent synchrony--> monitor serial ABGs  Abx switched to cefepime/vancomycin today/ received one dose of caspo overnight--> would possibly need to broaden to Chloe despite concern for lower seizure threshold--> to check with ID in am    Worsening hypoxia--> titrating sedation/ paralytics PRN to optimize vent synchrony  Bronched with BAL growing GP/GNRs--> continue current ABx regimen    H&H stable/ HCT 34%--> To monitor, no need for blood or blood products transfusion at this time   TREY improving Cr down to 1.2 with brisk diuretic response   On TPN/Sugars and LFTs normal except mild hyperbili- Hypo k and Hypo mg repleted   Ppx: SCDs/PPI and asp precaution     Patient remained critically ill and requires ICU care, prognosis guarded.   ATTENDING: I have personally and independently provided 60 Min of critical care services. 52 M w/ PMHx of HTN, type A aortic dissection s/p Dacron grafts, AV resuspension in 2013, CAD s/p CABG x 1 SVG to RCA (5/2013 with Dr. Medina), seizure disorder who was admitted for surgical mgmt of progression of aneurysmal disease. Recent admission 3/20-4/14 during which patient underwent replacement of transverse aortic arch, second stage TEVAR and aorto-axillary bypass, AV replacement (bio 23mm), and CABG x 1 (SVG-RCA) EF 75% (Ignacio, 3/20). Post op cb severe cardiogenic and vasogenic shock, TREY requiring CVVHD and temporary dialysis. Camr off RRT and discharged home mid April.presented to [facility] ED 4/27 for syncope vs seizure episode at home. negative neuro work up for Seizures AED dose adjusted however imaging at that rime revealed recent graft endoleak, Admitted now for surgical mgmt.   S/p Second stage thoracic endovascular aortic repair  5/5 transient LE weakness, Improved   LD clamped and Dc's 5/8  worsening leukocytosis- CT C/A/P suggestive of acute pancreatitis with 2 fluid collections / Lipase on admission > 1000 down to 95 5/8   dropping H&H and LUQ hematoma suggestive of hemorrhagic pancreatitis   A/p  No recent seizures, valproate titrated off given association with pancreatitis/ Vimpat dose increased to 200 Mg BID/EEG in place  Also deeply sedated on high dose propofol   Febrile, tachycardic and tachypneic with hypoxia   Required increasing sedation and paralysis for better vent synchrony--> monitor serial ABGs  Abx switched to cefepime/vancomycin today/ received one dose of caspo overnight--> would possibly need to broaden to Chloe despite concern for lower seizure threshold--> to check with ID in am    Worsening hypoxia--> titrating sedation/ paralytics PRN to optimize vent synchrony  Bronched with BAL growing GP/GNRs--> continue current ABx regimen    H&H stable/ HCT 34%--> To monitor, no need for blood or blood products transfusion at this time   TREY improving Cr down to 1.2 with brisk diuretic response/ Continue PRN diuretics for neutral to sl Neg FB   On TPN/Sugars and LFTs normal except mild hyperbili- Hypo k and Hypo mg repleted   Ppx: SCDs/PPI and asp precaution     Patient remained critically ill and requires ICU care, prognosis guarded.   ATTENDING: I have personally and independently provided 60 Min of critical care services. 52 M w/ PMHx of HTN, type A aortic dissection s/p Dacron grafts, AV resuspension in 2013, CAD s/p CABG x 1 SVG to RCA (5/2013 with Dr. Medina), seizure disorder who was admitted for surgical mgmt of progression of aneurysmal disease. Recent admission 3/20-4/14 during which patient underwent replacement of transverse aortic arch, second stage TEVAR and aorto-axillary bypass, AV replacement (bio 23mm), and CABG x 1 (SVG-RCA) EF 75% (Ignacio, 3/20). Post op cb severe cardiogenic and vasogenic shock, TREY requiring CVVHD and temporary dialysis. Camr off RRT and discharged home mid April.presented to [facility] ED 4/27 for syncope vs seizure episode at home. negative neuro work up for Seizures AED dose adjusted however imaging at that rime revealed recent graft endoleak, Admitted now for surgical mgmt.   S/p Second stage thoracic endovascular aortic repair  5/5 transient LE weakness, Improved   LD clamped and Dc's 5/8  worsening leukocytosis- CT C/A/P suggestive of acute pancreatitis with 2 fluid collections / Lipase on admission > 1000 down to 95 5/8   dropping H&H and LUQ hematoma suggestive of hemorrhagic pancreatitis   A/p  No recent seizures, valproate titrated off given association with pancreatitis/ Vimpat dose increased to 200 Mg BID/EEG in place  Also deeply sedated on high dose propofol   Febrile, tachycardic and tachypneic with hypoxia   Required increasing sedation and paralysis for better vent synchrony--> monitor serial ABGs  Abx switched to cefepime/vancomycin today/ received one dose of caspo overnight--> would possibly need to broaden to Chloe despite concern for lower seizure threshold--> to check with ID in am    Worsening hypoxia--> titrating sedation/ paralytics PRN to optimize vent synchrony  Bronched with BAL growing GP/GNRs--> continue current ABx regimen    H&H stable/ HCT 34%--> To monitor, no need for blood or blood products transfusion at this time   TREY improving Cr down to 1.2 with brisk diuretic response/ Continue PRN diuretics for neutral to sl Neg FB   On TPN/Sugars and LFTs normal except mild hyperbili- Hypo k and Hypo mg repleted   OGT placed for decompression and vomiting ~ 300 cc bilious fluid drained immediately-- continue low intermittent suction    Ppx: SCDs/PPI and asp precaution     Patient remained critically ill and requires ICU care, prognosis guarded.   ATTENDING: I have personally and independently provided 60 Min of critical care services. 52 M w/ PMHx of HTN, type A aortic dissection s/p Dacron grafts, AV resuspension in 2013, CAD s/p CABG x 1 SVG to RCA (5/2013 with Dr. Medina), seizure disorder who was admitted for surgical mgmt of progression of aneurysmal disease. Recent admission 3/20-4/14 during which patient underwent replacement of transverse aortic arch, second stage TEVAR and aorto-axillary bypass, AV replacement (bio 23mm), and CABG x 1 (SVG-RCA) EF 75% (Ignacio, 3/20). Post op cb severe cardiogenic and vasogenic shock, TREY requiring CVVHD and temporary dialysis. Camr off RRT and discharged home mid April.presented to [facility] ED 4/27 for syncope vs seizure episode at home. negative neuro work up for Seizures AED dose adjusted however imaging at that rime revealed recent graft endoleak, Admitted now for surgical mgmt.   S/p Second stage thoracic endovascular aortic repair  5/5 transient LE weakness, Improved   LD clamped and Dc's 5/8  worsening leukocytosis- CT C/A/P suggestive of acute pancreatitis with 2 fluid collections / Lipase on admission > 1000 down to 95 5/8   dropping H&H and LUQ hematoma suggestive of hemorrhagic pancreatitis   A/p  No recent seizures, valproate titrated off given association with pancreatitis/ Vimpat dose increased to 200 Mg BID/EEG in place  Also deeply sedated on high dose propofol   Febrile, tachycardic and tachypneic with hypoxia   Required increasing sedation and paralytics for vent synchrony--> monitor serial ABGs  Abx switched to cefepime/vancomycin today/ received one dose of caspo overnight--> would possibly need to broaden to Chloe despite concern for lower seizure threshold--> to check with ID in am    New central line form 5/15th  Worsening hypoxia--> titrating sedation/ paralytics PRN to optimize vent synchrony  Bronched with BAL growing GP/GNRs--> continue current ABx regimen    follow blood cultures   H&H stable/ HCT 34%--> To monitor, no need for blood or blood products transfusion at this time   TREY improving Cr down to 1.2 with brisk diuretic response/ Continue PRN diuretics for neutral to sl Neg FB   On TPN/Sugars and LFTs normal except mild hyperbili- Hypo k and Hypo mg repleted   OGT placed for decompression and vomiting ~ 300 cc bilious fluid drained immediately-- continue low intermittent suction    Ppx: SCDs/PPI and asp precaution     Patient remained critically ill and requires ICU care, prognosis guarded.   ATTENDING: I have personally and independently provided 60 Min of critical care services.

## 2023-05-17 NOTE — PROGRESS NOTE ADULT - CRITICAL CARE ATTENDING COMMENT
History of epilepsy previously on VA 1 g BID and LCM 100mg BID.  Patient seen and evaluated 5/17  intubated on sedation   Complicated hospital course, found to have hemorrhagic pancreatitis  Cases of life-threatening pancreatitis have been reported in patients receiving VA, so was tapering off  EEG has been negative for seizure.  plan as above

## 2023-05-17 NOTE — PROGRESS NOTE ADULT - ASSESSMENT
This is a 52yo Male pt with PMH HTN, type A aortic dissection s/p Dacron grafts, AV resuspension in 2013, CAD s/p CABG x 1 SVG to RCA (5/2013 with Dr. Medina), seizure disorder (last episode on 7/4/22) S/P replacement of transverse aortic arch, second stage thoracic endovascular aortic repair, aorto-axillary bypass, AV replacement (bio 23mm), in APr 2023 and CABG x 1 (SVG-RCA) EF 75% (Ignacio, 3/20) now s/p 2nd state TEVAR on 5/5 for whom surgery was consulted for finding of acute pancreatitis with large peripancreatic/perigastric fluid collections on CTA abdomen 5/8 then acute hemorrhage starting 5/12/23 requiring 11 U pRBC over last 48 hours but with negative mesenteric angiogram 5/13/23 for whom hepatobiliary surgery was reconsulted for possible hemorrhagic pancreatitis.    - Discussed possible paracentesis yesterday although ultimately deferred.   - No other acute changes needed from general surgical perspective  - Trend H&H regularly.   - Appreciate excellent care per primary team. Hepatobiliary surgery continuing to follow.

## 2023-05-17 NOTE — PROGRESS NOTE ADULT - SUBJECTIVE AND OBJECTIVE BOX
INTERVAL HPI/OVERNIGHT EVENTS:    3/20: AVR/arch replacement/CABG x 1/2nd stage TEVAR, aorto axillary bypass  EF 75%    52yo Male current every day marijuana use with a past medical hx of HTN, type A aortic dissection s/p Dacron grafts, AV resuspension in 2013,CAD s/p CABG x 1 SVG to RCA (5/2013 with Dr. Medina), Seizure disorder (last 7/4/22) presents for a follow up visit for evaluation and management of his aortic pathology. Patient is referred by Dr. Jacqueline Gonsales. Screen failed for Triomphe study     CTa C/A/P 11/30/22 - Status post graft repair of the ascending aorta and portion of aortic arch.  Dissecting aneurysm of the descending thoracic aorta (measuring up to 5.7 cm) and abdominal aorta (3.5 cm infrarenal), similar to the MR angiogram of 9/19/2022. Nonocclusive dissection flap present within the innominate artery, aneurysmal right subclavian artery and proximal right common carotid artery as well as aneurysmal left common iliac artery.    Cath (3/9/23): SVG-RCA patent, remaining vessels luminal irregularities. ACCESS: L radial w/ TR band for hemostasis.     to OR 3/20: intraop - 5L Crystalloid/7 units PRBC, 6unit FFP, 3unit platelet, 15unitc cryo, 1000cc FEIBA  arrived to ICU on Epi/Levo  postop - severe acidosis and Renal failure - lasix infusion started; bicarb given   EEG post-op and CT Head for poor mentation    CT Head 3/23: no acute intracranial abnormality   post-op Atrial fib - amiodarone & hypoxemia - bronch for pulm toilet  D10 started for noted hypoglycemia   CVVHD/Aquapharesis and later HD as needed - serial sessions for fluid removal to optimize before extubation     Extubated 2/29 to [facility] -    ultimately discharged -     Presented to [facility] 4/27 for syncope v seizure episode at home   Code stroke called - Imaging obtained unremarkable for cranial pathology - concern for endoleak - Esmolol infusion started and transferred to [facility] 4/27 - pancreatitis reported on imaging   of note - just seen by his neurologist and inc medication dosage recommended based on their exam 4/26  Neuro/Epilepsy consulted - EEG (-); CT spine to assess RUE weakness - negative  mental status clear - no witnessed seizures or change in mental status noted   patient discharged    patient returned 5/5 for planned TEVAR - lumbar drain placement   5/6 - Extubated - possible LE weakness - inc MAP/transfusion support and ongoing lumbar drainage with improvement    LD clamped and patient ambulated     5/8: repeat CTa C/A/P: Diffuse pancreatic enlargement and heterogenous attenuation   predominantly in body and tail, with multiple intrapancreatic and peripancreatic fluid collections with well-defined enhancing wall, new since November 30, 2022, not significantly changed since May 5, 2023 upon directed review. Largest fluid collection along stomach greater curvature   8.5 x 2 x 2 cm, communicates with inferior fluid collection measuring 5 x 3 x 3 cm.  Main pancreatic duct at head/uncinate appears mildly dilated up to 5 mm and is poorly visualized at pancreatic neck body and tail. Right groin subcutaneous tissues most compatible with old hematoma or seroma 4 x 3 cm    5/9: right pigtail placement (700 cc) - apical PTX  (+)delirium reported    5/11 - extreme abd pain reported   urinary retention - coulter replaced - 600cc out  CT Head - no acute intracranial abnormality  CT C/A/P (noncontrast): dec Rt pleural effusion s/p chest tube placement, pigtail coiled in major fissure. Unchanged mod left pleural effusion with unchanged adjacent atelectasis/consolidation.  Limited pancreas evaluation. Unchanged marked fullness pancreatic body/tail with heterogenous attenuation. Stable or slightly decreased extrapancreatic/perigastric fluid collections on this suboptimal noncontrast study. Abbreviated differential includes pseudocyst, walled off necrosis abscess. Recommend follow-up contrast-enhanced study to resolution. Unchanged small moderate abdominopelvic ascites. Unchanged caliber aneurysmal thoracic and abdominal aorta status post endovascular repair.    5/12: drop in Hg 6.6 (+)hemorrhagic shock     repeat CT 5/12:  Interval worsening of the thickening of the pancreatic body and tail with hyperdense components suggesting hemorrhage. Additional cystic changes in the pancreatic head are not well evaluated on this study. Underlying pathology such as inflammation or neoplasm cannot be excluded based on these non contrast images.    intubated and taken to IR for embolization - 4 U pRBC given ; general surgery following - no arterial bleeding noted - no intervention    intercurrent seizure - epilepsy team contacted and meds adjusted ; EEG placed and ongoing     ongoing transfusion support requirement  required additional transfusion support 5/13-5/15 - 11 U pRBC/3 FFP/2 Cryo/2 plt   Tito titrated off 5/15    attempted sedation hold to assess neuro status -   serial counts have been stable and no transfusion given for 24 hours - cont to monitor  TPN restarted 5/15    in light of pancreatic risk and bleeding risk - titrating valproate down to off with guidance from epilepsy - no seizure on EEG (ongoing)  ongoing TPN     no acute events overnight    ICU Vital Signs Last 24 Hrs  T(C): 36.1 (17 May 2023 08:43), Max: 37.2 (17 May 2023 01:08)  T(F): 97 (17 May 2023 08:43), Max: 98.9 (17 May 2023 01:08)  HR: 90 (17 May 2023 09:00) (86 - 138) sinus  BP: 122/78 (17 May 2023 08:25) (122/78 - 122/78)  BP(mean): 96 (17 May 2023 08:25) (96 - 96)  ABP: 151/81 (17 May 2023 09:00) (97/55 - 151/81)  ABP(mean): 110 (17 May 2023 09:00) (71 - 116)  RR: 25 (17 May 2023 09:00) (12 - 42)  SpO2: 99% (17 May 2023 09:00) (91% - 100%) Fi02 40%    Qtts:   Precedex  Propofol    I&O's Summary    16 May 2023 07:01  -  17 May 2023 07:00  --------------------------------------------------------  IN: 4516.9 mL / OUT: 1250 mL / NET: 3266.9 mL    17 May 2023 07:01  -  17 May 2023 10:05  --------------------------------------------------------  IN: 167 mL / OUT: 140 mL / NET: 27 mL    Mode: AC/ CMV (Assist Control/ Continuous Mandatory Ventilation)  RR (machine): 12  TV (machine): 500  FiO2: 40  PEEP: 5  ITime: 1  MAP: 13  PIP: 25    Physical Exam    Heart - regular (-)rub/gallop  Lungs - dec BS - soft NTND (-)r/r/g  Abd - (+)BS obese/soft NTND (-)r/r/g  Ext - warm to touch no cyansos/clubbing  Chest - prevena in place  Neuro - pupils reactive to light; otherwise sedate and unable at this time  Skin - no rash     LABS:                        9.7    9.87  )-----------( 142      ( 17 May 2023 03:56 )             28.2     05-17    143  |  109<H>  |  60<H>  ----------------------------<  124<H>  3.5   |  23  |  1.50<H>    Ca    8.6      17 May 2023 03:56  Phos  2.8     05-17  Mg     2.1     05-17    TPro  5.4<L>  /  Alb  2.9<L>  /  TBili  1.3<H>  /  DBili  x   /  AST  15  /  ALT  5<L>  /  AlkPhos  70  05-17    PT/INR - ( 17 May 2023 03:56 )   PT: 16.8 sec;   INR: 1.41     PTT - ( 17 May 2023 03:56 )  PTT:34.4 sec    ABG - ( 17 May 2023 03:52 )  pH, Arterial: 7.46  pH, Blood: x     /  pCO2: 29    /  pO2: 79    / HCO3: 21    / Base Excess: -2.1  /  SaO2: 97.8      RADIOLOGY & ADDITIONAL STUDIES: reviewed   sputum 3/27 : serratia marcescens    Patient with complicated medical and surgical Hx with known pancreatitis and seizure disorder with uncontrolled seizures presenting for planned TEVAR 5/5 with some post-op LE weakness - improved with transfusion/inc MAP and lumbar drain - since removed with intercurrent progression of pancreatitis with bleeding/hemorrhagic shock - being followed by surgery; attempted IR - no arterial bleeding/no intervention - prior significant transfusion requirement - no transfusion last 24 hours - monitoring closely     1. CV  Hemorrhagic shock - improved ; concern for SIRS/early sepsis   titrated off Tito 5/15 but labile BP today - volume resuscitation started overnight - Cx patient and start Abx - cont volume resuscitation ongoing/Tito as needed to maintain MAP 65   sinus   monitor for ongoing transfusion requirement - serial labs  if need additional products will obtain 3 phase/contrast scan to assess  ASA    2. GI  pancreatitis - known from prior admit on CT (asymptomatic)  valproate can be associated with pancreatitis - to d/w neuro/epilepsy medications - complicated as patient with known seizure disorder and intermittent seizures on last admit and this admit  Most recent imaging 5/12   no evidence of ongoing bleeding over last 48-72 hours - monitor  surgery following  new line placed 5/15 - ongoing TPN    3. Neuro  known seizure do and witnessed seizure on this admit  vimpat now 200 IV q8h; valproate 500 q12h with plan to titrate to 250 5/17  EEG remains in place   titration of meds per Epilepsy team     4. Renal  monitor UO/Lytes and Cr  avoid nephrotoxins as much as able     SCD and GI prophylaxis             I have spent/provided stated minutes of critical care time to this patient: 90  INTERVAL HPI/OVERNIGHT EVENTS:    3/20: AVR/arch replacement/CABG x 1/2nd stage TEVAR, aorto axillary bypass  EF 75%    54yo Male current every day marijuana use with a past medical hx of HTN, type A aortic dissection s/p Dacron grafts, AV resuspension in 2013,CAD s/p CABG x 1 SVG to RCA (5/2013 with Dr. Medina), Seizure disorder (last 7/4/22) presents for a follow up visit for evaluation and management of his aortic pathology. Patient is referred by Dr. Jacqueline Gonsales. Screen failed for Triomphe study     CTa C/A/P 11/30/22 - Status post graft repair of the ascending aorta and portion of aortic arch.  Dissecting aneurysm of the descending thoracic aorta (measuring up to 5.7 cm) and abdominal aorta (3.5 cm infrarenal), similar to the MR angiogram of 9/19/2022. Nonocclusive dissection flap present within the innominate artery, aneurysmal right subclavian artery and proximal right common carotid artery as well as aneurysmal left common iliac artery.    Cath (3/9/23): SVG-RCA patent, remaining vessels luminal irregularities. ACCESS: L radial w/ TR band for hemostasis.     to OR 3/20: intraop - 5L Crystalloid/7 units PRBC, 6unit FFP, 3unit platelet, 15unitc cryo, 1000cc FEIBA  arrived to ICU on Epi/Levo  postop - severe acidosis and Renal failure - lasix infusion started; bicarb given   EEG post-op and CT Head for poor mentation    CT Head 3/23: no acute intracranial abnormality   post-op Atrial fib - amiodarone & hypoxemia - bronch for pulm toilet  D10 started for noted hypoglycemia   CVVHD/Aquapharesis and later HD as needed - serial sessions for fluid removal to optimize before extubation     Extubated 2/29 to [facility] -    ultimately discharged -     Presented to [facility] 4/27 for syncope v seizure episode at home   Code stroke called - Imaging obtained unremarkable for cranial pathology - concern for endoleak - Esmolol infusion started and transferred to [facility] 4/27 - pancreatitis reported on imaging   of note - just seen by his neurologist and inc medication dosage recommended based on their exam 4/26  Neuro/Epilepsy consulted - EEG (-); CT spine to assess RUE weakness - negative  mental status clear - no witnessed seizures or change in mental status noted   patient discharged    patient returned 5/5 for planned TEVAR - lumbar drain placement   5/6 - Extubated - possible LE weakness - inc MAP/transfusion support and ongoing lumbar drainage with improvement    LD clamped and patient ambulated     5/8: repeat CTa C/A/P: Diffuse pancreatic enlargement and heterogenous attenuation   predominantly in body and tail, with multiple intrapancreatic and peripancreatic fluid collections with well-defined enhancing wall, new since November 30, 2022, not significantly changed since May 5, 2023 upon directed review. Largest fluid collection along stomach greater curvature   8.5 x 2 x 2 cm, communicates with inferior fluid collection measuring 5 x 3 x 3 cm.  Main pancreatic duct at head/uncinate appears mildly dilated up to 5 mm and is poorly visualized at pancreatic neck body and tail. Right groin subcutaneous tissues most compatible with old hematoma or seroma 4 x 3 cm    5/9: right pigtail placement (700 cc) - apical PTX  (+)delirium reported    5/11 - extreme abd pain reported   urinary retention - coulter replaced - 600cc out  CT Head - no acute intracranial abnormality  CT C/A/P (noncontrast): dec Rt pleural effusion s/p chest tube placement, pigtail coiled in major fissure. Unchanged mod left pleural effusion with unchanged adjacent atelectasis/consolidation.  Limited pancreas evaluation. Unchanged marked fullness pancreatic body/tail with heterogenous attenuation. Stable or slightly decreased extrapancreatic/perigastric fluid collections on this suboptimal noncontrast study. Abbreviated differential includes pseudocyst, walled off necrosis abscess. Recommend follow-up contrast-enhanced study to resolution. Unchanged small moderate abdominopelvic ascites. Unchanged caliber aneurysmal thoracic and abdominal aorta status post endovascular repair.    5/12: drop in Hg 6.6 (+)hemorrhagic shock     repeat CT 5/12:  Interval worsening of the thickening of the pancreatic body and tail with hyperdense components suggesting hemorrhage. Additional cystic changes in the pancreatic head are not well evaluated on this study. Underlying pathology such as inflammation or neoplasm cannot be excluded based on these non contrast images.    intubated and taken to IR for embolization - 4 U pRBC given ; general surgery following - no arterial bleeding noted - no intervention    intercurrent seizure - epilepsy team contacted and meds adjusted ; EEG placed and ongoing     ongoing transfusion support requirement  required additional transfusion support 5/13-5/15 - 11 U pRBC/3 FFP/2 Cryo/2 plt   Tito titrated off 5/15    attempted sedation hold to assess neuro status -   serial counts have been stable and no transfusion given for 24 hours - cont to monitor  TPN restarted 5/15    in light of pancreatic risk and bleeding risk - titrating valproate down to off with guidance from epilepsy - no seizure on EEG (ongoing)  ongoing TPN     no acute events overnight    ICU Vital Signs Last 24 Hrs  T(C): 36.1 (17 May 2023 08:43), Max: 37.2 (17 May 2023 01:08)  T(F): 97 (17 May 2023 08:43), Max: 98.9 (17 May 2023 01:08)  HR: 90 (17 May 2023 09:00) (86 - 138) sinus  BP: 122/78 (17 May 2023 08:25) (122/78 - 122/78)  BP(mean): 96 (17 May 2023 08:25) (96 - 96)  ABP: 151/81 (17 May 2023 09:00) (97/55 - 151/81)  ABP(mean): 110 (17 May 2023 09:00) (71 - 116)  RR: 25 (17 May 2023 09:00) (12 - 42)  SpO2: 99% (17 May 2023 09:00) (91% - 100%) Fi02 40%    Qtts:   Precedex  Propofol    I&O's Summary    16 May 2023 07:01  -  17 May 2023 07:00  --------------------------------------------------------  IN: 4516.9 mL / OUT: 1250 mL / NET: 3266.9 mL    17 May 2023 07:01  -  17 May 2023 10:05  --------------------------------------------------------  IN: 167 mL / OUT: 140 mL / NET: 27 mL    Mode: AC/ CMV (Assist Control/ Continuous Mandatory Ventilation)  RR (machine): 12  TV (machine): 500  FiO2: 40  PEEP: 5  ITime: 1  MAP: 13  PIP: 25    Physical Exam    Heart - regular (-)rub/gallop  Lungs - dec BS - soft NTND (-)r/r/g  Abd - (+)BS obese/soft NTND (-)r/r/g  Ext - warm to touch no cyanosis/clubbing  Chest - prevena in place  Neuro - pupils reactive to light; otherwise sedate and unable at this time  Skin - no rash     LABS:                        9.7    9.87  )-----------( 142      ( 17 May 2023 03:56 )             28.2     05-17    143  |  109<H>  |  60<H>  ----------------------------<  124<H>  3.5   |  23  |  1.50<H>    Ca    8.6      17 May 2023 03:56  Phos  2.8     05-17  Mg     2.1     05-17    TPro  5.4<L>  /  Alb  2.9<L>  /  TBili  1.3<H>  /  DBili  x   /  AST  15  /  ALT  5<L>  /  AlkPhos  70  05-17    PT/INR - ( 17 May 2023 03:56 )   PT: 16.8 sec;   INR: 1.41     PTT - ( 17 May 2023 03:56 )  PTT:34.4 sec    ABG - ( 17 May 2023 03:52 )  pH, Arterial: 7.46  pH, Blood: x     /  pCO2: 29    /  pO2: 79    / HCO3: 21    / Base Excess: -2.1  /  SaO2: 97.8      RADIOLOGY & ADDITIONAL STUDIES: reviewed   sputum 3/27 : serratia marcescens    Patient with complicated medical and surgical Hx with known pancreatitis and seizure disorder with uncontrolled seizures presenting for planned TEVAR 5/5 with some post-op LE weakness - improved with transfusion/inc MAP and lumbar drain - since removed with intercurrent progression of pancreatitis with bleeding/hemorrhagic shock - being followed by surgery; attempted IR - no arterial bleeding/no intervention - prior significant transfusion requirement - no transfusion last 24 hours - monitoring closely     1. CV  Hemorrhagic shock - improved ; concern for SIRS/early sepsis   titrated off Tito 5/15 but labile BP today - volume resuscitation started overnight - Cx patient and start Abx - cont volume resuscitation ongoing/Tito as needed to maintain MAP 65   sinus   monitor for ongoing transfusion requirement - serial labs  if need additional products will obtain 3 phase/contrast scan to assess  ASA    2. GI  pancreatitis - known from prior admit on CT (asymptomatic)  valproate can be associated with pancreatitis - to d/w neuro/epilepsy medications - complicated as patient with known seizure disorder and intermittent seizures on last admit and this admit  Most recent imaging 5/12   no evidence of ongoing bleeding over last 48-72 hours - monitor  surgery following  new line placed 5/15 - ongoing TPN    3. Neuro  known seizure do and witnessed seizure on this admit  vimpat now 200 IV q8h; valproate 500 q12h with plan to titrate to 250 5/17  EEG remains in place   titration of meds per Epilepsy team     4. Renal  monitor UO/Lytes and Cr  avoid nephrotoxins as much as able     SCD and GI prophylaxis     d/w family/staff/patient/ID & CTS    I have spent/provided stated minutes of critical care time to this patient: 90

## 2023-05-17 NOTE — CHART NOTE - NSCHARTNOTEFT_GEN_A_CORE
Infectious Diseases Anti-infective Approval Note    Medication: cefepime  Dose: 2g  Route: IV  Frequency: q12h  Duration**: 3 days    *THIS IS NOT AN INFECTIOUS DISEASES CONSULTATION*    **Indicates duration of approval, not necessarily duration of treatment**

## 2023-05-17 NOTE — PROGRESS NOTE ADULT - SUBJECTIVE AND OBJECTIVE BOX
INTERVAL COURSE  Remained critically ill  worsening hypoxia, sedation augmented for better vent synchrony  Cultured/ Bronched Abx changed to cefepime and vancomycin/ BAl with GNRs/GPRs   Valproate titrated off for possible drug induced pancreatitis, Vimpat dose increased  Continued on TPN/ H&H stable     VITALS  Vital Signs Last 24 Hrs  T(C): 38.7 (17 May 2023 23:00), Max: 38.9 (17 May 2023 22:00)  T(F): 101.7 (17 May 2023 23:00), Max: 102 (17 May 2023 22:00)  HR: 127 (18 May 2023 00:00) (83 - 148)  BP: 122/78 (17 May 2023 08:25) (122/78 - 122/78)  BP(mean): 96 (17 May 2023 08:25) (96 - 96)  RR: 20 (18 May 2023 00:00) (18 - 45)  SpO2: 96% (18 May 2023 00:00) (91% - 100%)  Parameters below as of 18 May 2023 01:00  Patient On (Oxygen Delivery Method): ventilator  O2 Flow (L/min): 60    I&O's Summary    16 May 2023 07:01  -  17 May 2023 07:00  --------------------------------------------------------  IN: 4516.9 mL / OUT: 1250 mL / NET: 3266.9 mL    17 May 2023 07:01  -  18 May 2023 00:49  --------------------------------------------------------  IN: 3527 mL / OUT: 1730 mL / NET: 1797 mL    PHYSICAL EXAM  General: deeply sedated   Respiratory: Rhonchorous   Cardiovascular: Sinus tachycardia   Gastrointestinal: firm, mildly distended   Extremities: warm, anasarcic     MEDICATIONS  (STANDING):  aspirin  chewable 81 milliGRAM(s) Oral daily  caspofungin IVPB 70 milliGRAM(s) IV Intermittent once  cefepime   IVPB      cefepime   IVPB 2000 milliGRAM(s) IV Intermittent every 12 hours  chlorhexidine 0.12% Liquid 15 milliLiter(s) Oral Mucosa every 12 hours  chlorhexidine 2% Cloths 1 Application(s) Topical daily  fentaNYL   Infusion 0.5 MICROgram(s)/kG/Hr (3.5 mL/Hr) IV Continuous <Continuous>  lacosamide IVPB 200 milliGRAM(s) IV Intermittent every 12 hours  pantoprazole  Injectable 40 milliGRAM(s) IV Push daily  Parenteral Nutrition - Adult 1 Each (50 mL/Hr) TPN Continuous <Continuous>  propofol Infusion 30 MICROgram(s)/kG/Min (12.6 mL/Hr) IV Continuous <Continuous>  sodium chloride 0.9%. 1000 milliLiter(s) (10 mL/Hr) IV Continuous <Continuous>  vancomycin  IVPB 1000 milliGRAM(s) IV Intermittent every 12 hours    MEDICATIONS  (PRN):  fentaNYL    Injectable 25 MICROGram(s) IV Push every 3 hours PRN Severe Pain (7 - 10)      LABS                        11.4   4.76  )-----------( 175      ( 17 May 2023 20:38 )             34.8     05-17    139  |  110<H>  |  55<H>  ----------------------------<  116<H>  3.5   |  19<L>  |  1.20    Ca    8.4      17 May 2023 20:38  Phos  2.5     05-17  Mg     1.8     05-17    TPro  5.7<L>  /  Alb  2.8<L>  /  TBili  1.7<H>  /  DBili  x   /  AST  18  /  ALT  6<L>  /  AlkPhos  89  05-17    LIVER FUNCTIONS - ( 17 May 2023 20:38 )  Alb: 2.8 g/dL / Pro: 5.7 g/dL / ALK PHOS: 89 U/L / ALT: 6 U/L / AST: 18 U/L / GGT: x           PT/INR - ( 17 May 2023 20:38 )   PT: 17.0 sec;   INR: 1.42          PTT - ( 17 May 2023 20:38 )  PTT:33.2 sec  Urinalysis Basic - ( 17 May 2023 20:04 )    Color: Yellow / Appearance: Clear / S.020 / pH: x  Gluc: x / Ketone: NEGATIVE  / Bili: Negative / Urobili: 0.2 E.U./dL   Blood: x / Protein: 100 mg/dL / Nitrite: NEGATIVE   Leuk Esterase: NEGATIVE / RBC: 5-10 /HPF / WBC < 5 /HPF   Sq Epi: x / Non Sq Epi: x / Bacteria: Present /HPF    IMAGING/EKG/ETC  Reviewed.

## 2023-05-17 NOTE — PROGRESS NOTE ADULT - SUBJECTIVE AND OBJECTIVE BOX
IDENTIFICATION: This is a 54yo Male pt with PMH HTN, type A aortic dissection s/p Dacron grafts, AV resuspension in 2013, CAD s/p CABG x 1 SVG to RCA (5/2013 with Dr. Medina), seizure disorder (last episode on 7/4/22) S/P replacement of transverse aortic arch, second stage thoracic endovascular aortic repair, aorto-axillary bypass, AV replacement (bio 23mm), in APr 2023 and CABG x 1 (SVG-RCA) EF 75% (Ignacio, 3/20) now s/p 2nd state TEVAR on 5/5 for whom surgery was consulted for finding of acute pancreatitis with large peripancreatic/perigastric fluid collections on CTA abdomen 5/8 then acute hemorrhage starting 5/12/23 requiring 11 U pRBC over last 48 hours but with negative mesenteric angiogram 5/13/23 for whom hepatobiliary surgery was reconsulted for possible hemorrhagic pancreatitis.    EVENTS:   - Remains intubated/sedated  - NO transfusion yesterday; H&H stable  - 4 x Alb bolus overnight for tachycardia up to 130.     SUBJECTIVE:   - Sedated    MEDICATIONS  (STANDING):  albumin human  5% IVPB 250 milliLiter(s) IV Intermittent once  albumin human  5% IVPB 250 milliLiter(s) IV Intermittent once  aspirin enteric coated 81 milliGRAM(s) Oral daily  cefepime   IVPB 2000 milliGRAM(s) IV Intermittent every 12 hours  cefepime   IVPB 2000 milliGRAM(s) IV Intermittent once  cefepime   IVPB      chlorhexidine 0.12% Liquid 15 milliLiter(s) Oral Mucosa every 12 hours  chlorhexidine 2% Cloths 1 Application(s) Topical daily  lacosamide IVPB 200 milliGRAM(s) IV Intermittent every 12 hours  pantoprazole  Injectable 40 milliGRAM(s) IV Push daily  Parenteral Nutrition - Adult 1 Each (50 mL/Hr) TPN Continuous <Continuous>  Parenteral Nutrition - Adult 1 Each (50 mL/Hr) TPN Continuous <Continuous>  phenylephrine    Infusion 0.3 MICROgram(s)/kG/Min (7.88 mL/Hr) IV Continuous <Continuous>  potassium chloride  20 mEq/100 mL IVPB 20 milliEquivalent(s) IV Intermittent once  propofol Infusion 30 MICROgram(s)/kG/Min (12.6 mL/Hr) IV Continuous <Continuous>  sodium chloride 0.9%. 1000 milliLiter(s) (10 mL/Hr) IV Continuous <Continuous>  valproate sodium  IVPB 500 milliGRAM(s) IV Intermittent every 12 hours  vancomycin  IVPB 1000 milliGRAM(s) IV Intermittent every 12 hours  vasopressin Infusion 0.06 Unit(s)/Min (9 mL/Hr) IV Continuous <Continuous>    MEDICATIONS  (PRN):  fentaNYL    Injectable 25 MICROGram(s) IV Push every 3 hours PRN Severe Pain (7 - 10)      Vital Signs Last 24 Hrs  T(C): 36.1 (17 May 2023 08:43), Max: 37.2 (17 May 2023 01:08)  T(F): 97 (17 May 2023 08:43), Max: 98.9 (17 May 2023 01:08)  HR: 90 (17 May 2023 11:00) (86 - 138)  BP: 122/78 (17 May 2023 08:25) (122/78 - 122/78)  BP(mean): 96 (17 May 2023 08:25) (96 - 96)  RR: 34 (17 May 2023 11:00) (12 - 42)  SpO2: 97% (17 May 2023 11:00) (91% - 100%)    Parameters below as of 17 May 2023 11:00  Patient On (Oxygen Delivery Method): ventilator    O2 Concentration (%): 60    Neurologic: Sedated  CV: Normal rate, regular rhythm  Pulm: Breathing comfortably on MV with equal chest rise.  Abd: Mildly distended; mild reaction to palpation despite sedation  : No Cantrell  Skin: No rashes  Extremities: No edema.  Psychiatric: Sedated      I&O's Detail    16 May 2023 07:01  -  17 May 2023 07:00  --------------------------------------------------------  IN:    Albumin 5%  - 250 mL: 1100 mL    Dexmedetomidine: 102 mL    IV PiggyBack: 625 mL    IV PiggyBack: 1000 mL    Propofol: 42 mL    Propofol: 207.9 mL    sodium chloride 0.9%: 240 mL    TPN (Total Parenteral Nutrition): 1200 mL  Total IN: 4516.9 mL    OUT:    Indwelling Catheter - Urethral (mL): 1250 mL  Total OUT: 1250 mL    Total NET: 3266.9 mL      17 May 2023 07:01  -  17 May 2023 11:29  --------------------------------------------------------  IN:    Albumin 5%  - 250 mL: 500 mL    Dexmedetomidine: 26.4 mL    IV PiggyBack: 70 mL    Propofol: 86.1 mL    sodium chloride 0.9%: 50 mL    TPN (Total Parenteral Nutrition): 250 mL  Total IN: 982.5 mL    OUT:    Indwelling Catheter - Urethral (mL): 245 mL  Total OUT: 245 mL    Total NET: 737.5 mL          LABS:                        10.4   9.57  )-----------( 146      ( 17 May 2023 10:14 )             30.3     05-17    144  |  113<H>  |  56<H>  ----------------------------<  124<H>  3.8   |  22  |  1.39<H>    Ca    8.6      17 May 2023 10:14  Phos  2.8     05-17  Mg     2.0     05-17    TPro  5.6<L>  /  Alb  2.7<L>  /  TBili  1.2  /  DBili  x   /  AST  14  /  ALT  <5<L>  /  AlkPhos  72  05-17    PT/INR - ( 17 May 2023 10:14 )   PT: 16.2 sec;   INR: 1.36          PTT - ( 17 May 2023 10:14 )  PTT:32.8 sec      RADIOLOGY & ADDITIONAL STUDIES:

## 2023-05-17 NOTE — PROCEDURE NOTE - NSBRONCHPROCDETAILS_GEN_A_CORE_FT
CTICU  CRITICAL  CARE  PROCEDURE  NOTE      				     Name of procedure – Flexible fiberoptic bronchoscopy      Flexible fiberoptic bronchoscopy was performed under propofol and fentanyl sedation while the patient was intubated for controlled mechanical ventilation  Pre-procedural assessment reveals no risk of TB  Ventilator settings were adjusted to reduce airway pressures to reduce the risk of ptx  The scope was advanced past the ett which was noted to be ~2 cm the leeanne   MARCO, LLL, RUL, RML, RLL all with thick, mucopurulent secretions. Lavaged and aspirated. Sent for culture  CXR confirmed no pneumothorax post bronch  There were no complications  The patient tolerated the procedure well

## 2023-05-17 NOTE — CHART NOTE - NSCHARTNOTEFT_GEN_A_CORE
Admitting Diagnosis:   Patient is a 53y old  Male who presents with a chief complaint of endoleak, abdominal pain (17 May 2023 11:27)      PAST MEDICAL & SURGICAL HISTORY:  HTN (hypertension)      Aortic dissection      CAD (coronary artery disease)      Seizure disorder      S/P aortic bifurcation bypass graft      S/P CABG x 1    Current Nutrition Order:  TPN: 150g Dex, 70g AA, 790Kcal, 70g protein, 1.5 GIR (based on 70kg)    *Ordered for Propofol, Rate 21ml provides ~554kcal. Total kcal from TPN/Propofol=1344kcal     PO Intake: Good (%) [   ]  Fair (50-75%) [   ] Poor (<25%) [   ]-- NA NPO/TPN     GI Issues:   BMx3 , Noted as diarrhea   Protonix ordered     Pain: none per flow sheets     Skin Integrity:  Sandro 12, No pressure ulcer-Skin Tear Noted   Edema: 3+Gen, 4+Scrotum      Labs:       144  |  113<H>  |  56<H>  ----------------------------<  124<H>  3.8   |  22  |  1.39<H>    Ca    8.6      17 May 2023 10:14  Phos  2.8       Mg     2.0         TPro  5.6<L>  /  Alb  2.7<L>  /  TBili  1.2  /  DBili  x   /  AST  14  /  ALT  <5<L>  /  AlkPhos  72      CAPILLARY BLOOD GLUCOSE          Medications:  MEDICATIONS  (STANDING):  aspirin  chewable 81 milliGRAM(s) Oral daily  cefepime   IVPB 2000 milliGRAM(s) IV Intermittent every 12 hours  cefepime   IVPB      chlorhexidine 0.12% Liquid 15 milliLiter(s) Oral Mucosa every 12 hours  chlorhexidine 2% Cloths 1 Application(s) Topical daily  lacosamide IVPB 200 milliGRAM(s) IV Intermittent every 12 hours  pantoprazole  Injectable 40 milliGRAM(s) IV Push daily  Parenteral Nutrition - Adult 1 Each (50 mL/Hr) TPN Continuous <Continuous>  Parenteral Nutrition - Adult 1 Each (50 mL/Hr) TPN Continuous <Continuous>  phenylephrine    Infusion 0.3 MICROgram(s)/kG/Min (7.88 mL/Hr) IV Continuous <Continuous>  propofol Infusion 30 MICROgram(s)/kG/Min (12.6 mL/Hr) IV Continuous <Continuous>  sodium chloride 0.9%. 1000 milliLiter(s) (10 mL/Hr) IV Continuous <Continuous>  valproate sodium  IVPB 500 milliGRAM(s) IV Intermittent every 12 hours  vancomycin  IVPB 1000 milliGRAM(s) IV Intermittent every 12 hours  vasopressin Infusion 0.06 Unit(s)/Min (9 mL/Hr) IV Continuous <Continuous>    MEDICATIONS  (PRN):  fentaNYL    Injectable 25 MICROGram(s) IV Push every 3 hours PRN Severe Pain (7 - 10)          6'0''  pounds+-10%   Wt 154 pounds BMI 20.9 %IBW87    Weight Change: Based on most recent EMR wt     Estimated energy needs:   current body wt used for energy calculations as pt falls within % IBW  adjust for age, post op needs, pancreatitis, ICU level of care, Vent  25-30kcal/k-2094kcal/day   1.4-1.6gm/k-112gm prot/day     Subjective:    53yr old male with PMH HTN, 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 Recent admission 3/20- during which patient underwent replacement of transverse aortic arch, second stage thoracic endovascular aortic repair, aorto-axillary bypass, AV replacement (bio 23mm), and CABG x 1 (SVG-RCA) EF 75% (Ignacio, 3/20). Post op cb lactic acidosis, severe cardiogenic and vasogenic shock, TREY requiring CVVHD and temporary dialysis requirement. Recent admission - for seizure episode, were his AEDS were adjusted. Presented to [place] ED  for epigastric pain. CT exam which showed known endoleak for which pt was tx to [facility]. Patient underwent TEVAR with Dr. Pierre , LD placed prior by NSGY. Arrived intubated on propofol.  extubated. 1 unit pRBC, CTH performed for decreased LE mvmt. Improved later in day.  LD clamped and patient ambulated, plan for dc.  US showing groin seroma and hematoma. CT scan showing necrotizing pancreatitis. Zosyn started and Gen surg consulted. Recommended IR and GI consults and further evaluation with imaging. Gi believes 2/2 insults from surgeries back in March and recommended outpatient fu.  new RIJ and PA catheter placed, R pigtail placed cb pneumothorax.  gen surg signed off. Repeat imaging showing concern for possible malignancy in pancreatic tail. Radiology attending called during day to say findings likely 2/2 pancreatitis and recommended outpatient contrast study. Around 7pm patient had questionable seizure-like episode. Given 2mg ativan. Labs sig for Hb drop to ~6. TREY with oliguria. Stat CT showing [id number] hematoma, mod-large ascites with layering in pelvis. Given 4 units pRBC and sent to IR for possible intervention.  returned from Ir intubated, no arterial bleeder identified therefore no intervention performed. Hb ~8, gen surg recommending additional transfusion.  Total transfusion 7 pRBC, 2 cryo, 2 FFP, 1 plt. Was given additional blood overnight (1 pRBC). Gen surg on board.  1 unit pRBC during day for episode of hypotension. 1 episode of seizure resolved with propofol push. Vimpat dose adjusted per neurology. Given 2 FFP and 1 plt. 5/15 discussed with neurology possibilty of depakote induced pancreatitis-dose being adjusted. 5/15 no changes, depakote level adjusted, eeg neg.     Pt seen this AM on [facility]. EEG ongoing. RN at bedside. Pt vented: SIMV mode. MAP 89. Remains ordered for vaso, phenylephrine and propofol (Rate 21ml provides ~554kcal). Pt remains ordered for TPN, team asking for updated Recs.   Labs: Low HH, ALT SGPT 5, TG 29 5/10, Amylase 101 5/14, Lipase 82 5/14, Lactate 1.4, BIli 1.3, Na K Mg Phos WDL (K had been low prior), , BUN/Cr 60/1.50.   Please see below for nutritions recommendations. Recs made with team.     Previous Nutrition Diagnosis: Inadequate Energy Intake RT intake<EER AEB clears  New PES: Increased nutrients RT increased demands AEB Vent   GOAL: Pt will meet at least 75% of nutrient needs via feasible route    Recommendations:  1. Should PN consider the following:   >> TPN: 250Dex, 98g AA, 50g 20% Lipids Every Other Day to provide in total 1742Kcal, 98g protein, 2.4 GIR (Based on 70kg).  >> Will adjust Lipid Use Pending Propofol use / Pending LFTs, Lipase, Amylase.   >> Recommend Checking TG weekly (please obtain new TG as last read for 5/10). Check Mg, Phos, K daily and POC BG Q6hrs. Replete Lytes PRN. Trend daily weights. Fluids and lytes per MD discretion.  >> Start at 150g Dex on Day 1, 200g Dex on Day 2, and advance to goal of g Dex on Day 3.   2. Pain/GI per team.  3. Labs: monitor BMP, CBC, glucose, lytes, trend renal indices, LFTs, POCT, lipids/TG, lactate, Amylase and Lipase; MAP.  4. RD to remain available for additional nutrition interventions as needed.     Education: NA.    Risk Level: High [X  ] Moderate [   ] Low [   ]. Admitting Diagnosis:   Patient is a 53y old  Male who presents with a chief complaint of endoleak, abdominal pain (17 May 2023 11:27)      PAST MEDICAL & SURGICAL HISTORY:  HTN (hypertension)      Aortic dissection      CAD (coronary artery disease)      Seizure disorder      S/P aortic bifurcation bypass graft      S/P CABG x 1    Current Nutrition Order:  TPN: 150g Dex, 70g AA, 790Kcal, 70g protein, 1.5 GIR (based on 70kg)    *Ordered for Propofol, Rate 21ml provides ~554kcal. Total kcal from TPN/Propofol=1344kcal     PO Intake: Good (%) [   ]  Fair (50-75%) [   ] Poor (<25%) [   ]-- NA NPO/TPN     GI Issues:   BMx3 , Noted as diarrhea   Protonix ordered     Pain: none per flow sheets     Skin Integrity:  Sandro 12, No pressure ulcer-Skin Tear Noted   Edema: 3+Gen, 4+Scrotum      Labs:       144  |  113<H>  |  56<H>  ----------------------------<  124<H>  3.8   |  22  |  1.39<H>    Ca    8.6      17 May 2023 10:14  Phos  2.8       Mg     2.0         TPro  5.6<L>  /  Alb  2.7<L>  /  TBili  1.2  /  DBili  x   /  AST  14  /  ALT  <5<L>  /  AlkPhos  72      CAPILLARY BLOOD GLUCOSE          Medications:  MEDICATIONS  (STANDING):  aspirin  chewable 81 milliGRAM(s) Oral daily  cefepime   IVPB 2000 milliGRAM(s) IV Intermittent every 12 hours  cefepime   IVPB      chlorhexidine 0.12% Liquid 15 milliLiter(s) Oral Mucosa every 12 hours  chlorhexidine 2% Cloths 1 Application(s) Topical daily  lacosamide IVPB 200 milliGRAM(s) IV Intermittent every 12 hours  pantoprazole  Injectable 40 milliGRAM(s) IV Push daily  Parenteral Nutrition - Adult 1 Each (50 mL/Hr) TPN Continuous <Continuous>  Parenteral Nutrition - Adult 1 Each (50 mL/Hr) TPN Continuous <Continuous>  phenylephrine    Infusion 0.3 MICROgram(s)/kG/Min (7.88 mL/Hr) IV Continuous <Continuous>  propofol Infusion 30 MICROgram(s)/kG/Min (12.6 mL/Hr) IV Continuous <Continuous>  sodium chloride 0.9%. 1000 milliLiter(s) (10 mL/Hr) IV Continuous <Continuous>  valproate sodium  IVPB 500 milliGRAM(s) IV Intermittent every 12 hours  vancomycin  IVPB 1000 milliGRAM(s) IV Intermittent every 12 hours  vasopressin Infusion 0.06 Unit(s)/Min (9 mL/Hr) IV Continuous <Continuous>    MEDICATIONS  (PRN):  fentaNYL    Injectable 25 MICROGram(s) IV Push every 3 hours PRN Severe Pain (7 - 10)          6'0''  pounds+-10%   Wt 154 pounds BMI 20.9 %IBW87    Weight Change: Based on most recent EMR wt     Estimated energy needs:   current body wt used for energy calculations as pt falls within % IBW  adjust for age, post op needs, pancreatitis, ICU level of care, Vent  25-30kcal/k-2094kcal/day   1.4-1.6gm/k-112gm prot/day     Subjective:    53yr old male with PMH HTN, 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 Recent admission 3/20- during which patient underwent replacement of transverse aortic arch, second stage thoracic endovascular aortic repair, aorto-axillary bypass, AV replacement (bio 23mm), and CABG x 1 (SVG-RCA) EF 75% (Ignacio, 3/20). Post op cb lactic acidosis, severe cardiogenic and vasogenic shock, TREY requiring CVVHD and temporary dialysis requirement. Recent admission - for seizure episode, were his AEDS were adjusted. Presented to [place] ED  for epigastric pain. CT exam which showed known endoleak for which pt was tx to [facility]. Patient underwent TEVAR with Dr. Pierre , LD placed prior by NSGY. Arrived intubated on propofol.  extubated. 1 unit pRBC, CTH performed for decreased LE mvmt. Improved later in day.  LD clamped and patient ambulated, plan for dc.  US showing groin seroma and hematoma. CT scan showing necrotizing pancreatitis. Zosyn started and Gen surg consulted. Recommended IR and GI consults and further evaluation with imaging. Gi believes 2/2 insults from surgeries back in March and recommended outpatient fu.  new RIJ and PA catheter placed, R pigtail placed cb pneumothorax.  gen surg signed off. Repeat imaging showing concern for possible malignancy in pancreatic tail. Radiology attending called during day to say findings likely 2/2 pancreatitis and recommended outpatient contrast study. Around 7pm patient had questionable seizure-like episode. Given 2mg ativan. Labs sig for Hb drop to ~6. TREY with oliguria. Stat CT showing [id number] hematoma, mod-large ascites with layering in pelvis. Given 4 units pRBC and sent to IR for possible intervention.  returned from Ir intubated, no arterial bleeder identified therefore no intervention performed. Hb ~8, gen surg recommending additional transfusion.  Total transfusion 7 pRBC, 2 cryo, 2 FFP, 1 plt. Was given additional blood overnight (1 pRBC). Gen surg on board.  1 unit pRBC during day for episode of hypotension. 1 episode of seizure resolved with propofol push. Vimpat dose adjusted per neurology. Given 2 FFP and 1 plt. 5/15 discussed with neurology possibilty of depakote induced pancreatitis-dose being adjusted. 5/15 no changes, depakote level adjusted, eeg neg.     Pt seen this AM on [facility]. EEG ongoing. RN at bedside. Pt vented: SIMV mode. MAP 89. Remains ordered for vaso, phenylephrine and propofol (Rate 21ml provides ~554kcal). Pt remains ordered for TPN, team asking for updated Recs.   Labs: Low HH, ALT SGPT 5, TG 29 5/10, Amylase 101 5/14, Lipase 82 5/14, Lactate 1.4, BIli 1.3, Na K Mg Phos WDL (K had been low prior), , BUN/Cr 60/1.50.   Please see below for nutritions recommendations. Spoke with team.     Previous Nutrition Diagnosis: Inadequate Energy Intake RT intake<EER AEB clears  New PES: Increased nutrients RT increased demands AEB Vent   >>on going   GOAL: Pt will meet at least 75% of nutrient needs via feasible route    Recommendations:  1. Should PN remain consider the following:   >> TPN: 250Dex, 98g AA. Will provide ~1242Kcal (1796kcal with current propofol rate of 21ml/hr), 98g protein, 2.4 GIR (Based on 70kg).  >> Will adjust Lipid Use Pending Propofol use / Pending LFTs, Lipase, Amylase.   >> Recommend Checking TG weekly (please obtain new TG as last read for 5/10). Check Mg, Phos, K daily and POC BG Q6hrs. Cont to Check Lipase and Amylase Every Other Day. Replete Lytes PRN. Trend daily weights. Fluids and lytes per MD discretion.  >> Start at 150g Dex on Day 1, 200g Dex on Day 2, and advance to goal of g Dex on Day 3.   2. Pain/GI per team.  3. Labs: monitor BMP, CBC, glucose, lytes, trend renal indices, LFTs, POCT, lipids/TG, lactate, Amylase and Lipase; MAP.  4. RD to remain available for additional nutrition interventions as needed.     Education: NA.    Risk Level: High [X  ] Moderate [   ] Low [   ].

## 2023-05-17 NOTE — CHART NOTE - NSCHARTNOTEFT_GEN_A_CORE
Pt appears to have SIRS, possibly becoming septic?  Required volume resuscitation overnight for hypotension.  This am, desaturating requiring bronch by Dr. Velasquez revealing copious amount of thick secretions.  Hypotensive this am to the 80's systolic; Briefly responding to Tito push 1cc at a time (x 3 over the course of 30 min or so).  Tachypnic to the 30-40's on CMV.      Recent Serratia PNA treated w/ Zosyn earlier this month.      Started Vasopressin- good response.    Albumin given.   Changed vent over to SIMV for better vent syncrony  Inc'd FiO2 to 60 in response to PO2 67 on ABG.    D/C'd Precedex and restarted Propofol.    Blood cultures x 2 sent.  Started Cefepime 2g Q12 (ID approval and dosing by Dr. Martinez) empirically along with Vancomycin 1g Q12.  Will consider Meropenem per Dr. Martinez, awaiting review of risk for seizures with Chloe.  For now start w/ Cefepime.  F/U all cultures from today to help gear abx treatment.    Case reviewed with Dr. Velasquez.  Will update Dr. Pierre as well.

## 2023-05-17 NOTE — PROGRESS NOTE ADULT - SUBJECTIVE AND OBJECTIVE BOX
Subjective  No events overnight. No complaints currently.    ROS  As above, otherwise negative for constitutional/HEENT/CV/pulm/GI//MSK/neuro/derm/endocrine/psych.     MEDICATIONS  (STANDING):  aspirin enteric coated 81 milliGRAM(s) Oral daily  cefepime   IVPB 2000 milliGRAM(s) IV Intermittent once  cefepime   IVPB      cefepime   IVPB 2000 milliGRAM(s) IV Intermittent every 12 hours  chlorhexidine 0.12% Liquid 15 milliLiter(s) Oral Mucosa every 12 hours  chlorhexidine 2% Cloths 1 Application(s) Topical daily  lacosamide IVPB 200 milliGRAM(s) IV Intermittent every 12 hours  pantoprazole  Injectable 40 milliGRAM(s) IV Push daily  Parenteral Nutrition - Adult 1 Each (50 mL/Hr) TPN Continuous <Continuous>  Parenteral Nutrition - Adult 1 Each (50 mL/Hr) TPN Continuous <Continuous>  phenylephrine    Infusion 0.3 MICROgram(s)/kG/Min (7.88 mL/Hr) IV Continuous <Continuous>  propofol Infusion 30 MICROgram(s)/kG/Min (12.6 mL/Hr) IV Continuous <Continuous>  sodium chloride 0.9%. 1000 milliLiter(s) (10 mL/Hr) IV Continuous <Continuous>  valproate sodium  IVPB 500 milliGRAM(s) IV Intermittent every 12 hours  vancomycin  IVPB 1000 milliGRAM(s) IV Intermittent every 12 hours  vasopressin Infusion 0.06 Unit(s)/Min (9 mL/Hr) IV Continuous <Continuous>    MEDICATIONS  (PRN):  fentaNYL    Injectable 25 MICROGram(s) IV Push every 3 hours PRN Severe Pain (7 - 10)      T(C): 36.1 (05-17-23 @ 08:43), Max: 37.2 (05-17-23 @ 01:08)  HR: 90 (05-17-23 @ 11:00) (86 - 138)  BP: 122/78 (05-17-23 @ 08:25) (122/78 - 122/78)  RR: 34 (05-17-23 @ 11:00) (12 - 42)  SpO2: 97% (05-17-23 @ 11:00) (91% - 100%)  Wt(kg): --    Eyes open spontaneously. Conversational with appropriate sentences.  EOMI. Visual fields full. PERRL 3>2. Face symmetrical.  Full strength throughout.  Finger-nose-finger intact R/L.  Intact to light touch throughout.    CBC Full  -  ( 17 May 2023 10:14 )  WBC Count : 9.57 K/uL  RBC Count : 3.41 M/uL  Hemoglobin : 10.4 g/dL  Hematocrit : 30.3 %  Platelet Count - Automated : 146 K/uL  Mean Cell Volume : 88.9 fl  Mean Cell Hemoglobin : 30.5 pg  Mean Cell Hemoglobin Concentration : 34.3 gm/dL  Auto Neutrophil # : x  Auto Lymphocyte # : x  Auto Monocyte # : x  Auto Eosinophil # : x  Auto Basophil # : x  Auto Neutrophil % : x  Auto Lymphocyte % : x  Auto Monocyte % : x  Auto Eosinophil % : x  Auto Basophil % : x    05-17    144  |  113<H>  |  56<H>  ----------------------------<  124<H>  3.8   |  22  |  1.39<H>    Ca    8.6      17 May 2023 10:14  Phos  2.8     05-17  Mg     2.0     05-17    TPro  5.6<L>  /  Alb  2.7<L>  /  TBili  1.2  /  DBili  x   /  AST  14  /  ALT  <5<L>  /  AlkPhos  72  05-17    LIVER FUNCTIONS - ( 17 May 2023 10:14 )  Alb: 2.7 g/dL / Pro: 5.6 g/dL / ALK PHOS: 72 U/L / ALT: <5 U/L / AST: 14 U/L / GGT: x           PT/INR - ( 17 May 2023 10:14 )   PT: 16.2 sec;   INR: 1.36          PTT - ( 17 May 2023 10:14 )  PTT:32.8 sec      EEG: EPILEPSY PROGRESS NOTE:   Patient seen and examined at bedside. Wife at bedside, and concerns addressed regarding AED medication changes.  There were no seizures reported overnight, and wife states the last seizure was on 5/11/23. This provider contact patient's neurologist Delilah Hernandez at [facility] () on 5/16 and again on 5/17 with no return call back.     REVIEW OF SYSTEMS:   Unobtainable 2/2 medically induced coma    MEDICATIONS  (STANDING):  aspirin enteric coated 81 milliGRAM(s) Oral daily  cefepime   IVPB 2000 milliGRAM(s) IV Intermittent once  cefepime   IVPB      cefepime   IVPB 2000 milliGRAM(s) IV Intermittent every 12 hours  chlorhexidine 0.12% Liquid 15 milliLiter(s) Oral Mucosa every 12 hours  chlorhexidine 2% Cloths 1 Application(s) Topical daily  lacosamide IVPB 200 milliGRAM(s) IV Intermittent every 12 hours  pantoprazole  Injectable 40 milliGRAM(s) IV Push daily  Parenteral Nutrition - Adult 1 Each (50 mL/Hr) TPN Continuous <Continuous>  Parenteral Nutrition - Adult 1 Each (50 mL/Hr) TPN Continuous <Continuous>  phenylephrine    Infusion 0.3 MICROgram(s)/kG/Min (7.88 mL/Hr) IV Continuous <Continuous>  propofol Infusion 30 MICROgram(s)/kG/Min (12.6 mL/Hr) IV Continuous <Continuous>  sodium chloride 0.9%. 1000 milliLiter(s) (10 mL/Hr) IV Continuous <Continuous>  valproate sodium  IVPB 500 milliGRAM(s) IV Intermittent every 12 hours  vancomycin  IVPB 1000 milliGRAM(s) IV Intermittent every 12 hours  vasopressin Infusion 0.06 Unit(s)/Min (9 mL/Hr) IV Continuous <Continuous>    MEDICATIONS  (PRN):  fentaNYL    Injectable 25 MICROGram(s) IV Push every 3 hours PRN Severe Pain (7 - 10)    VITAL SIGNS:   T(C): 36.1 (05-17-23 @ 08:43), Max: 37.2 (05-17-23 @ 01:08)  HR: 90 (05-17-23 @ 11:00) (86 - 138)  BP: 122/78 (05-17-23 @ 08:25) (122/78 - 122/78)  RR: 34 (05-17-23 @ 11:00) (12 - 42)  SpO2: 97% (05-17-23 @ 11:00) (91% - 100%)  Wt(kg): --    PHYSICAL EXAM:   Intubated and sedated on propofol 50mcg/kg/min. Eyes closed, does not follow commands. PERRLA 2mm brisk, +cough and gag reflex. No withdrawal to painful stimuli in all limbs and reflexes absent.     LABS:   CBC Full  -  ( 17 May 2023 10:14 )  WBC Count : 9.57 K/uL  RBC Count : 3.41 M/uL  Hemoglobin : 10.4 g/dL  Hematocrit : 30.3 %  Platelet Count - Automated : 146 K/uL  Mean Cell Volume : 88.9 fl  Mean Cell Hemoglobin : 30.5 pg  Mean Cell Hemoglobin Concentration : 34.3 gm/dL  Auto Neutrophil # : x  Auto Lymphocyte # : x  Auto Monocyte # : x  Auto Eosinophil # : x  Auto Basophil # : x  Auto Neutrophil % : x  Auto Lymphocyte % : x  Auto Monocyte % : x  Auto Eosinophil % : x  Auto Basophil % : x    05-17    144  |  113<H>  |  56<H>  ----------------------------<  124<H>  3.8   |  22  |  1.39<H>    Ca    8.6      17 May 2023 10:14  Phos  2.8     05-17  Mg     2.0     05-17    TPro  5.6<L>  /  Alb  2.7<L>  /  TBili  1.2  /  DBili  x   /  AST  14  /  ALT  <5<L>  /  AlkPhos  72  05-17    LIVER FUNCTIONS - ( 17 May 2023 10:14 )  Alb: 2.7 g/dL / Pro: 5.6 g/dL / ALK PHOS: 72 U/L / ALT: <5 U/L / AST: 14 U/L / GGT: x           PT/INR - ( 17 May 2023 10:14 )   PT: 16.2 sec;   INR: 1.36     PTT - ( 17 May 2023 10:14 )  PTT:32.8 sec       EPILEPSY PROGRESS NOTE:   Patient seen and examined at bedside. Wife at bedside, and concerns addressed regarding AED medication changes.  There were no seizures reported overnight, and wife states the last seizure was on 5/11/23. This provider contact patient's neurologist Delilah Hernandez at [facility] () on 5/16 and again on 5/17 with no return call back.     REVIEW OF SYSTEMS:   Unobtainable 2/2 medically induced coma    MEDICATIONS  (STANDING):  aspirin enteric coated 81 milliGRAM(s) Oral daily  cefepime   IVPB 2000 milliGRAM(s) IV Intermittent once  cefepime   IVPB      cefepime   IVPB 2000 milliGRAM(s) IV Intermittent every 12 hours  chlorhexidine 0.12% Liquid 15 milliLiter(s) Oral Mucosa every 12 hours  chlorhexidine 2% Cloths 1 Application(s) Topical daily  lacosamide IVPB 200 milliGRAM(s) IV Intermittent every 12 hours  pantoprazole  Injectable 40 milliGRAM(s) IV Push daily  Parenteral Nutrition - Adult 1 Each (50 mL/Hr) TPN Continuous <Continuous>  Parenteral Nutrition - Adult 1 Each (50 mL/Hr) TPN Continuous <Continuous>  phenylephrine    Infusion 0.3 MICROgram(s)/kG/Min (7.88 mL/Hr) IV Continuous <Continuous>  propofol Infusion 30 MICROgram(s)/kG/Min (12.6 mL/Hr) IV Continuous <Continuous>  sodium chloride 0.9%. 1000 milliLiter(s) (10 mL/Hr) IV Continuous <Continuous>  valproate sodium  IVPB 500 milliGRAM(s) IV Intermittent every 12 hours  vancomycin  IVPB 1000 milliGRAM(s) IV Intermittent every 12 hours  vasopressin Infusion 0.06 Unit(s)/Min (9 mL/Hr) IV Continuous <Continuous>    MEDICATIONS  (PRN):  fentaNYL    Injectable 25 MICROGram(s) IV Push every 3 hours PRN Severe Pain (7 - 10)    VITAL SIGNS:   T(C): 36.1 (05-17-23 @ 08:43), Max: 37.2 (05-17-23 @ 01:08)  HR: 90 (05-17-23 @ 11:00) (86 - 138)  BP: 122/78 (05-17-23 @ 08:25) (122/78 - 122/78)  RR: 34 (05-17-23 @ 11:00) (12 - 42)  SpO2: 97% (05-17-23 @ 11:00) (91% - 100%)  Wt(kg): --    PHYSICAL EXAM:   Intubated and sedated on propofol 50mcg/kg/min. Eyes closed, does not follow commands. PERRLA 2mm brisk, +cough and gag reflex. No withdrawal to painful stimuli in all limbs and reflexes absent.     LABS:   CBC Full  -  ( 17 May 2023 10:14 )  WBC Count : 9.57 K/uL  RBC Count : 3.41 M/uL  Hemoglobin : 10.4 g/dL  Hematocrit : 30.3 %  Platelet Count - Automated : 146 K/uL  Mean Cell Volume : 88.9 fl  Mean Cell Hemoglobin : 30.5 pg  Mean Cell Hemoglobin Concentration : 34.3 gm/dL  Auto Neutrophil # : x  Auto Lymphocyte # : x  Auto Monocyte # : x  Auto Eosinophil # : x  Auto Basophil # : x  Auto Neutrophil % : x  Auto Lymphocyte % : x  Auto Monocyte % : x  Auto Eosinophil % : x  Auto Basophil % : x    05-17    144  |  113<H>  |  56<H>  ----------------------------<  124<H>  3.8   |  22  |  1.39<H>    Ca    8.6      17 May 2023 10:14  Phos  2.8     05-17  Mg     2.0     05-17    TPro  5.6<L>  /  Alb  2.7<L>  /  TBili  1.2  /  DBili  x   /  AST  14  /  ALT  <5<L>  /  AlkPhos  72  05-17    LIVER FUNCTIONS - ( 17 May 2023 10:14 )  Alb: 2.7 g/dL / Pro: 5.6 g/dL / ALK PHOS: 72 U/L / ALT: <5 U/L / AST: 14 U/L / GGT: x           PT/INR - ( 17 May 2023 10:14 )   PT: 16.2 sec;   INR: 1.36     PTT - ( 17 May 2023 10:14 )  PTT:32.8 sec

## 2023-05-17 NOTE — EEG REPORT - NS EEG TEXT BOX
Daily Updates (from 07:00 am until 07:00 am):  Day 4 5/16-5/17/2023:   Pertinent medications: Depakote 250mg BID, LCM 200mg BID  Background:  continuous, with predominantly alpha/theta frequencies.  Voltage:  Normal (20+ uV)  Organization:  Rudimentary  Posterior Dominant Rhythm:  <7 Hz symmetric, well-organized, and poorly-modulated  Sleep:  Symmetric, synchronous spindles and K complexes.  Focal abnormalities:  No persistent asymmetries of voltage or frequency.  Spontaneous Activity:  No epileptiform discharges  Choose an item. Choose an item.  Symmetry:  Bilaterally symmetric  Generalized Slowing:  Mild to moderate  Events: nonenso  Provocations:  3)	Hyperventilation: was not performed.  4)	Photic stimulation: was not performed.  Impression/clinical correlation:   Abnormal continuous EEG  2)	Mild to moderate generalized slowing suggestive of diffuse or multifocal cerebral dysfunction     Dary Flores MD  Attending Neurologist, [facility]

## 2023-05-17 NOTE — PROGRESS NOTE ADULT - ASSESSMENT
53 year-old male with PMH HTN, type A aortic dissection s/p Dacron grafts, AV resuspension in 2013, CAD s/p CABG x 1 SVG to RCA (5/2013 with Dr. Medina), seizure disorder (last episode on 7/4/22) S/P replacement of transverse aortic arch, second stage thoracic endovascular aortic repair, aorto-axillary bypass, AV replacement (bio 23mm), in APr 2023 and CABG x 1 (SVG-RCA) EF 75% (Ignacio, 3/20) now s/p 2nd state TEVAR on 5/5, and was found to have acute pancreatitis with large peripancreatic/perigastric fluid collections on CTA abdomen on 5/8.     Epilepsy team following for suspected seizure event on 5/14/23 that did not correlate with findings on EEG recordings. Also concerns for drug-related hemorrhagic pancreatitis due to depakote, and hereby the medication has been weaned.     RECOMMENDATIONS:   - Continue vEEG monitoring   - Decrease depakote 500mg Q12hrs to 250mg Q12hrs on 5/17/23 then stop  - Continue Vimpat 200mg Q12hrs   - F/u lacosamide level  - Ativan 2mg IVP for seizures > 2mins  - Keep Mg>2 and K >4.   - Management per primary team.

## 2023-05-18 LAB
-  K. PNEUMONIAE GROUP: SIGNIFICANT CHANGE UP
ALBUMIN SERPL ELPH-MCNC: 2.6 G/DL — LOW (ref 3.3–5)
ALBUMIN SERPL ELPH-MCNC: 2.7 G/DL — LOW (ref 3.3–5)
ALBUMIN SERPL ELPH-MCNC: 2.8 G/DL — LOW (ref 3.3–5)
ALP SERPL-CCNC: 79 U/L — SIGNIFICANT CHANGE UP (ref 40–120)
ALP SERPL-CCNC: 81 U/L — SIGNIFICANT CHANGE UP (ref 40–120)
ALP SERPL-CCNC: 89 U/L — SIGNIFICANT CHANGE UP (ref 40–120)
ALT FLD-CCNC: 5 U/L — LOW (ref 10–45)
ALT FLD-CCNC: 5 U/L — LOW (ref 10–45)
ALT FLD-CCNC: 6 U/L — LOW (ref 10–45)
AMYLASE P1 CFR SERPL: 89 U/L — SIGNIFICANT CHANGE UP (ref 25–125)
ANION GAP SERPL CALC-SCNC: 10 MMOL/L — SIGNIFICANT CHANGE UP (ref 5–17)
ANION GAP SERPL CALC-SCNC: 10 MMOL/L — SIGNIFICANT CHANGE UP (ref 5–17)
ANION GAP SERPL CALC-SCNC: 9 MMOL/L — SIGNIFICANT CHANGE UP (ref 5–17)
APTT BLD: 33.3 SEC — SIGNIFICANT CHANGE UP (ref 27.5–35.5)
APTT BLD: 34.3 SEC — SIGNIFICANT CHANGE UP (ref 27.5–35.5)
APTT BLD: 34.7 SEC — SIGNIFICANT CHANGE UP (ref 27.5–35.5)
AST SERPL-CCNC: 16 U/L — SIGNIFICANT CHANGE UP (ref 10–40)
AST SERPL-CCNC: 16 U/L — SIGNIFICANT CHANGE UP (ref 10–40)
AST SERPL-CCNC: 19 U/L — SIGNIFICANT CHANGE UP (ref 10–40)
BASE EXCESS BLDA CALC-SCNC: -3.1 MMOL/L — LOW (ref -2–3)
BASE EXCESS BLDA CALC-SCNC: 0.5 MMOL/L — SIGNIFICANT CHANGE UP (ref -2–3)
BASE EXCESS BLDV CALC-SCNC: -2.9 MMOL/L — LOW (ref -2–3)
BILIRUB SERPL-MCNC: 1.7 MG/DL — HIGH (ref 0.2–1.2)
BILIRUB SERPL-MCNC: 1.8 MG/DL — HIGH (ref 0.2–1.2)
BILIRUB SERPL-MCNC: 2.2 MG/DL — HIGH (ref 0.2–1.2)
BUN SERPL-MCNC: 55 MG/DL — HIGH (ref 7–23)
BUN SERPL-MCNC: 56 MG/DL — HIGH (ref 7–23)
BUN SERPL-MCNC: 58 MG/DL — HIGH (ref 7–23)
CALCIUM SERPL-MCNC: 7.9 MG/DL — LOW (ref 8.4–10.5)
CALCIUM SERPL-MCNC: 8.4 MG/DL — SIGNIFICANT CHANGE UP (ref 8.4–10.5)
CALCIUM SERPL-MCNC: 8.9 MG/DL — SIGNIFICANT CHANGE UP (ref 8.4–10.5)
CHLORIDE SERPL-SCNC: 108 MMOL/L — SIGNIFICANT CHANGE UP (ref 96–108)
CHLORIDE SERPL-SCNC: 108 MMOL/L — SIGNIFICANT CHANGE UP (ref 96–108)
CHLORIDE SERPL-SCNC: 109 MMOL/L — HIGH (ref 96–108)
CK SERPL-CCNC: 17 U/L — LOW (ref 30–200)
CK SERPL-CCNC: 19 U/L — LOW (ref 30–200)
CO2 BLDA-SCNC: 22 MMOL/L — SIGNIFICANT CHANGE UP (ref 19–24)
CO2 BLDA-SCNC: 27 MMOL/L — HIGH (ref 19–24)
CO2 BLDV-SCNC: 23.9 MMOL/L — SIGNIFICANT CHANGE UP (ref 22–26)
CO2 SERPL-SCNC: 20 MMOL/L — LOW (ref 22–31)
CO2 SERPL-SCNC: 21 MMOL/L — LOW (ref 22–31)
CO2 SERPL-SCNC: 22 MMOL/L — SIGNIFICANT CHANGE UP (ref 22–31)
CREAT SERPL-MCNC: 1.27 MG/DL — SIGNIFICANT CHANGE UP (ref 0.5–1.3)
CREAT SERPL-MCNC: 1.28 MG/DL — SIGNIFICANT CHANGE UP (ref 0.5–1.3)
CREAT SERPL-MCNC: 1.33 MG/DL — HIGH (ref 0.5–1.3)
EGFR: 64 ML/MIN/1.73M2 — SIGNIFICANT CHANGE UP
EGFR: 67 ML/MIN/1.73M2 — SIGNIFICANT CHANGE UP
EGFR: 67 ML/MIN/1.73M2 — SIGNIFICANT CHANGE UP
FIBRINOGEN PPP-MCNC: 603 MG/DL — HIGH (ref 200–445)
GAS PNL BLDA: SIGNIFICANT CHANGE UP
GLUCOSE SERPL-MCNC: 125 MG/DL — HIGH (ref 70–99)
GLUCOSE SERPL-MCNC: 129 MG/DL — HIGH (ref 70–99)
GLUCOSE SERPL-MCNC: 135 MG/DL — HIGH (ref 70–99)
GRAM STN FLD: SIGNIFICANT CHANGE UP
GRAM STN FLD: SIGNIFICANT CHANGE UP
HCO3 BLDA-SCNC: 21 MMOL/L — SIGNIFICANT CHANGE UP (ref 21–28)
HCO3 BLDA-SCNC: 26 MMOL/L — SIGNIFICANT CHANGE UP (ref 21–28)
HCO3 BLDV-SCNC: 23 MMOL/L — SIGNIFICANT CHANGE UP (ref 22–29)
HCT VFR BLD CALC: 32.5 % — LOW (ref 39–50)
HCT VFR BLD CALC: 35.3 % — LOW (ref 39–50)
HCT VFR BLD CALC: 36.9 % — LOW (ref 39–50)
HGB BLD-MCNC: 10.5 G/DL — LOW (ref 13–17)
HGB BLD-MCNC: 11.6 G/DL — LOW (ref 13–17)
HGB BLD-MCNC: 12.1 G/DL — LOW (ref 13–17)
INR BLD: 1.33 — HIGH (ref 0.88–1.16)
INR BLD: 1.34 — HIGH (ref 0.88–1.16)
INR BLD: 1.37 — HIGH (ref 0.88–1.16)
ISTAT ARTERIAL BE: -5 MMOL/L — LOW (ref -2–3)
ISTAT ARTERIAL BE: 1 MMOL/L — SIGNIFICANT CHANGE UP (ref -2–3)
ISTAT ARTERIAL GLUCOSE: 113 MG/DL — HIGH (ref 70–99)
ISTAT ARTERIAL GLUCOSE: 118 MG/DL — HIGH (ref 70–99)
ISTAT ARTERIAL HCO3: 22 MMOL/L — SIGNIFICANT CHANGE UP (ref 22–26)
ISTAT ARTERIAL HCO3: 27 MMOL/L — HIGH (ref 22–26)
ISTAT ARTERIAL HEMATOCRIT: 34 % — LOW (ref 39–50)
ISTAT ARTERIAL HEMATOCRIT: 34 % — LOW (ref 39–50)
ISTAT ARTERIAL HEMOGLOBIN: 11.6 G/DL — LOW (ref 13–17)
ISTAT ARTERIAL HEMOGLOBIN: 11.6 G/DL — LOW (ref 13–17)
ISTAT ARTERIAL IONIZED CALCIUM: 1.22 MMOL/L — SIGNIFICANT CHANGE UP (ref 1.12–1.3)
ISTAT ARTERIAL IONIZED CALCIUM: 1.25 MMOL/L — SIGNIFICANT CHANGE UP (ref 1.12–1.3)
ISTAT ARTERIAL PCO2: 46 MMHG — HIGH (ref 35–45)
ISTAT ARTERIAL PCO2: 46 MMHG — HIGH (ref 35–45)
ISTAT ARTERIAL PH: 7.28 — LOW (ref 7.35–7.45)
ISTAT ARTERIAL PH: 7.37 — SIGNIFICANT CHANGE UP (ref 7.35–7.45)
ISTAT ARTERIAL PO2: 110 MMHG — HIGH (ref 80–105)
ISTAT ARTERIAL PO2: 134 MMHG — HIGH (ref 80–105)
ISTAT ARTERIAL POTASSIUM: 3.7 MMOL/L — SIGNIFICANT CHANGE UP (ref 3.5–5.3)
ISTAT ARTERIAL POTASSIUM: 3.9 MMOL/L — SIGNIFICANT CHANGE UP (ref 3.5–5.3)
ISTAT ARTERIAL SO2: 98 % — SIGNIFICANT CHANGE UP (ref 95–98)
ISTAT ARTERIAL SO2: 99 % — HIGH (ref 95–98)
ISTAT ARTERIAL SODIUM: 141 MMOL/L — SIGNIFICANT CHANGE UP (ref 135–145)
ISTAT ARTERIAL SODIUM: 144 MMOL/L — SIGNIFICANT CHANGE UP (ref 135–145)
ISTAT ARTERIAL TCO2: 23 MMOL/L — SIGNIFICANT CHANGE UP (ref 22–31)
ISTAT ARTERIAL TCO2: 28 MMOL/L — SIGNIFICANT CHANGE UP (ref 22–31)
LACTATE SERPL-SCNC: 1 MMOL/L — SIGNIFICANT CHANGE UP (ref 0.5–2)
LACTATE SERPL-SCNC: 1.1 MMOL/L — SIGNIFICANT CHANGE UP (ref 0.5–2)
LACTATE SERPL-SCNC: 1.2 MMOL/L — SIGNIFICANT CHANGE UP (ref 0.5–2)
LIDOCAIN IGE QN: 161 U/L — HIGH (ref 7–60)
MAGNESIUM SERPL-MCNC: 2.1 MG/DL — SIGNIFICANT CHANGE UP (ref 1.6–2.6)
MAGNESIUM SERPL-MCNC: 2.1 MG/DL — SIGNIFICANT CHANGE UP (ref 1.6–2.6)
MAGNESIUM SERPL-MCNC: 2.2 MG/DL — SIGNIFICANT CHANGE UP (ref 1.6–2.6)
MCHC RBC-ENTMCNC: 30 PG — SIGNIFICANT CHANGE UP (ref 27–34)
MCHC RBC-ENTMCNC: 30.1 PG — SIGNIFICANT CHANGE UP (ref 27–34)
MCHC RBC-ENTMCNC: 30.2 PG — SIGNIFICANT CHANGE UP (ref 27–34)
MCHC RBC-ENTMCNC: 32.3 GM/DL — SIGNIFICANT CHANGE UP (ref 32–36)
MCHC RBC-ENTMCNC: 32.8 GM/DL — SIGNIFICANT CHANGE UP (ref 32–36)
MCHC RBC-ENTMCNC: 32.9 GM/DL — SIGNIFICANT CHANGE UP (ref 32–36)
MCV RBC AUTO: 91.7 FL — SIGNIFICANT CHANGE UP (ref 80–100)
MCV RBC AUTO: 92 FL — SIGNIFICANT CHANGE UP (ref 80–100)
MCV RBC AUTO: 92.9 FL — SIGNIFICANT CHANGE UP (ref 80–100)
METHOD TYPE: SIGNIFICANT CHANGE UP
NRBC # BLD: 0 /100 WBCS — SIGNIFICANT CHANGE UP (ref 0–0)
PCO2 BLDA: 35 MMHG — SIGNIFICANT CHANGE UP (ref 35–48)
PCO2 BLDA: 44 MMHG — SIGNIFICANT CHANGE UP (ref 35–48)
PCO2 BLDV: 41 MMHG — LOW (ref 42–55)
PH BLDA: 7.38 — SIGNIFICANT CHANGE UP (ref 7.35–7.45)
PH BLDA: 7.39 — SIGNIFICANT CHANGE UP (ref 7.35–7.45)
PH BLDV: 7.35 — SIGNIFICANT CHANGE UP (ref 7.32–7.43)
PHOSPHATE SERPL-MCNC: 3.2 MG/DL — SIGNIFICANT CHANGE UP (ref 2.5–4.5)
PHOSPHATE SERPL-MCNC: 3.3 MG/DL — SIGNIFICANT CHANGE UP (ref 2.5–4.5)
PHOSPHATE SERPL-MCNC: 3.7 MG/DL — SIGNIFICANT CHANGE UP (ref 2.5–4.5)
PLATELET # BLD AUTO: 156 K/UL — SIGNIFICANT CHANGE UP (ref 150–400)
PLATELET # BLD AUTO: 199 K/UL — SIGNIFICANT CHANGE UP (ref 150–400)
PLATELET # BLD AUTO: 214 K/UL — SIGNIFICANT CHANGE UP (ref 150–400)
PO2 BLDA: 105 MMHG — SIGNIFICANT CHANGE UP (ref 83–108)
PO2 BLDA: 156 MMHG — HIGH (ref 83–108)
PO2 BLDV: 43 MMHG — SIGNIFICANT CHANGE UP (ref 25–45)
POTASSIUM SERPL-MCNC: 4.1 MMOL/L — SIGNIFICANT CHANGE UP (ref 3.5–5.3)
POTASSIUM SERPL-MCNC: 4.1 MMOL/L — SIGNIFICANT CHANGE UP (ref 3.5–5.3)
POTASSIUM SERPL-MCNC: 4.3 MMOL/L — SIGNIFICANT CHANGE UP (ref 3.5–5.3)
POTASSIUM SERPL-SCNC: 4.1 MMOL/L — SIGNIFICANT CHANGE UP (ref 3.5–5.3)
POTASSIUM SERPL-SCNC: 4.1 MMOL/L — SIGNIFICANT CHANGE UP (ref 3.5–5.3)
POTASSIUM SERPL-SCNC: 4.3 MMOL/L — SIGNIFICANT CHANGE UP (ref 3.5–5.3)
PROT SERPL-MCNC: 5.2 G/DL — LOW (ref 6–8.3)
PROT SERPL-MCNC: 5.7 G/DL — LOW (ref 6–8.3)
PROT SERPL-MCNC: 6.3 G/DL — SIGNIFICANT CHANGE UP (ref 6–8.3)
PROTHROM AB SERPL-ACNC: 15.9 SEC — HIGH (ref 10.5–13.4)
PROTHROM AB SERPL-ACNC: 16 SEC — HIGH (ref 10.5–13.4)
PROTHROM AB SERPL-ACNC: 16.3 SEC — HIGH (ref 10.5–13.4)
RBC # BLD: 3.5 M/UL — LOW (ref 4.2–5.8)
RBC # BLD: 3.85 M/UL — LOW (ref 4.2–5.8)
RBC # BLD: 4.01 M/UL — LOW (ref 4.2–5.8)
RBC # FLD: 16.5 % — HIGH (ref 10.3–14.5)
RBC # FLD: 16.5 % — HIGH (ref 10.3–14.5)
RBC # FLD: 16.6 % — HIGH (ref 10.3–14.5)
SAO2 % BLDA: 99.5 % — HIGH (ref 94–98)
SAO2 % BLDA: 99.9 % — HIGH (ref 94–98)
SAO2 % BLDV: 71 % — SIGNIFICANT CHANGE UP (ref 67–88)
SODIUM SERPL-SCNC: 138 MMOL/L — SIGNIFICANT CHANGE UP (ref 135–145)
SODIUM SERPL-SCNC: 139 MMOL/L — SIGNIFICANT CHANGE UP (ref 135–145)
SODIUM SERPL-SCNC: 140 MMOL/L — SIGNIFICANT CHANGE UP (ref 135–145)
TRIGL SERPL-MCNC: 145 MG/DL — SIGNIFICANT CHANGE UP
VANCOMYCIN TROUGH SERPL-MCNC: 10.6 UG/ML — SIGNIFICANT CHANGE UP (ref 10–20)
WBC # BLD: 10.68 K/UL — HIGH (ref 3.8–10.5)
WBC # BLD: 12.82 K/UL — HIGH (ref 3.8–10.5)
WBC # BLD: 7.87 K/UL — SIGNIFICANT CHANGE UP (ref 3.8–10.5)
WBC # FLD AUTO: 10.68 K/UL — HIGH (ref 3.8–10.5)
WBC # FLD AUTO: 12.82 K/UL — HIGH (ref 3.8–10.5)
WBC # FLD AUTO: 7.87 K/UL — SIGNIFICANT CHANGE UP (ref 3.8–10.5)

## 2023-05-18 PROCEDURE — 99232 SBSQ HOSP IP/OBS MODERATE 35: CPT

## 2023-05-18 RX ORDER — DESMOPRESSIN ACETATE 0.1 MG/1
20 TABLET ORAL ONCE
Refills: 0 | Status: DISCONTINUED | OUTPATIENT
Start: 2023-05-18 | End: 2023-05-18

## 2023-05-18 RX ORDER — BUMETANIDE 0.25 MG/ML
2 INJECTION INTRAMUSCULAR; INTRAVENOUS ONCE
Refills: 0 | Status: COMPLETED | OUTPATIENT
Start: 2023-05-18 | End: 2023-05-18

## 2023-05-18 RX ORDER — ELECTROLYTE SOLUTION,INJ
1 VIAL (ML) INTRAVENOUS
Refills: 0 | Status: DISCONTINUED | OUTPATIENT
Start: 2023-05-18 | End: 2023-05-18

## 2023-05-18 RX ORDER — MEROPENEM 1 G/30ML
2000 INJECTION INTRAVENOUS ONCE
Refills: 0 | Status: COMPLETED | OUTPATIENT
Start: 2023-05-18 | End: 2023-05-18

## 2023-05-18 RX ORDER — CISATRACURIUM BESYLATE 2 MG/ML
10 INJECTION INTRAVENOUS ONCE
Refills: 0 | Status: COMPLETED | OUTPATIENT
Start: 2023-05-18 | End: 2023-05-18

## 2023-05-18 RX ORDER — DEXMEDETOMIDINE HYDROCHLORIDE IN 0.9% SODIUM CHLORIDE 4 UG/ML
0.2 INJECTION INTRAVENOUS
Qty: 400 | Refills: 0 | Status: DISCONTINUED | OUTPATIENT
Start: 2023-05-18 | End: 2023-05-19

## 2023-05-18 RX ORDER — FENTANYL CITRATE 50 UG/ML
25 INJECTION INTRAVENOUS
Refills: 0 | Status: DISCONTINUED | OUTPATIENT
Start: 2023-05-18 | End: 2023-05-25

## 2023-05-18 RX ORDER — METOPROLOL TARTRATE 50 MG
10 TABLET ORAL ONCE
Refills: 0 | Status: COMPLETED | OUTPATIENT
Start: 2023-05-18 | End: 2023-05-17

## 2023-05-18 RX ORDER — FENTANYL CITRATE 50 UG/ML
50 INJECTION INTRAVENOUS ONCE
Refills: 0 | Status: DISCONTINUED | OUTPATIENT
Start: 2023-05-18 | End: 2023-05-18

## 2023-05-18 RX ORDER — ALBUMIN HUMAN 25 %
250 VIAL (ML) INTRAVENOUS
Refills: 0 | Status: COMPLETED | OUTPATIENT
Start: 2023-05-18 | End: 2023-05-19

## 2023-05-18 RX ORDER — SODIUM BICARBONATE 1 MEQ/ML
50 SYRINGE (ML) INTRAVENOUS ONCE
Refills: 0 | Status: COMPLETED | OUTPATIENT
Start: 2023-05-18 | End: 2023-05-18

## 2023-05-18 RX ORDER — LACOSAMIDE 50 MG/1
250 TABLET ORAL EVERY 12 HOURS
Refills: 0 | Status: DISCONTINUED | OUTPATIENT
Start: 2023-05-18 | End: 2023-05-31

## 2023-05-18 RX ORDER — FENTANYL CITRATE 50 UG/ML
0.5 INJECTION INTRAVENOUS
Qty: 2500 | Refills: 0 | Status: DISCONTINUED | OUTPATIENT
Start: 2023-05-18 | End: 2023-05-19

## 2023-05-18 RX ORDER — MIDAZOLAM HYDROCHLORIDE 1 MG/ML
2 INJECTION, SOLUTION INTRAMUSCULAR; INTRAVENOUS ONCE
Refills: 0 | Status: DISCONTINUED | OUTPATIENT
Start: 2023-05-18 | End: 2023-05-18

## 2023-05-18 RX ORDER — PIPERACILLIN AND TAZOBACTAM 4; .5 G/20ML; G/20ML
3.38 INJECTION, POWDER, LYOPHILIZED, FOR SOLUTION INTRAVENOUS ONCE
Refills: 0 | Status: COMPLETED | OUTPATIENT
Start: 2023-05-18 | End: 2023-05-18

## 2023-05-18 RX ORDER — FENTANYL CITRATE 50 UG/ML
50 INJECTION INTRAVENOUS ONCE
Refills: 0 | Status: DISCONTINUED | OUTPATIENT
Start: 2023-05-18 | End: 2023-05-20

## 2023-05-18 RX ORDER — FUROSEMIDE 40 MG
60 TABLET ORAL ONCE
Refills: 0 | Status: COMPLETED | OUTPATIENT
Start: 2023-05-18 | End: 2023-05-18

## 2023-05-18 RX ORDER — DESMOPRESSIN ACETATE 0.1 MG/1
20 TABLET ORAL ONCE
Refills: 0 | Status: COMPLETED | OUTPATIENT
Start: 2023-05-18 | End: 2023-05-18

## 2023-05-18 RX ORDER — CISATRACURIUM BESYLATE 2 MG/ML
3 INJECTION INTRAVENOUS
Qty: 200 | Refills: 0 | Status: DISCONTINUED | OUTPATIENT
Start: 2023-05-18 | End: 2023-05-19

## 2023-05-18 RX ORDER — PIPERACILLIN AND TAZOBACTAM 4; .5 G/20ML; G/20ML
3.38 INJECTION, POWDER, LYOPHILIZED, FOR SOLUTION INTRAVENOUS EVERY 8 HOURS
Refills: 0 | Status: DISCONTINUED | OUTPATIENT
Start: 2023-05-19 | End: 2023-05-21

## 2023-05-18 RX ORDER — MIDAZOLAM HYDROCHLORIDE 1 MG/ML
4 INJECTION, SOLUTION INTRAMUSCULAR; INTRAVENOUS ONCE
Refills: 0 | Status: DISCONTINUED | OUTPATIENT
Start: 2023-05-18 | End: 2023-05-18

## 2023-05-18 RX ORDER — SODIUM BICARBONATE 1 MEQ/ML
50 SYRINGE (ML) INTRAVENOUS
Refills: 0 | Status: COMPLETED | OUTPATIENT
Start: 2023-05-18 | End: 2023-05-18

## 2023-05-18 RX ORDER — MILRINONE LACTATE 1 MG/ML
0.38 INJECTION, SOLUTION INTRAVENOUS
Qty: 20 | Refills: 0 | Status: DISCONTINUED | OUTPATIENT
Start: 2023-05-18 | End: 2023-05-19

## 2023-05-18 RX ORDER — MIDAZOLAM HYDROCHLORIDE 1 MG/ML
0.02 INJECTION, SOLUTION INTRAMUSCULAR; INTRAVENOUS
Qty: 100 | Refills: 0 | Status: DISCONTINUED | OUTPATIENT
Start: 2023-05-18 | End: 2023-05-19

## 2023-05-18 RX ORDER — PHENYLEPHRINE HYDROCHLORIDE 10 MG/ML
0.1 INJECTION INTRAVENOUS
Qty: 40 | Refills: 0 | Status: DISCONTINUED | OUTPATIENT
Start: 2023-05-18 | End: 2023-05-19

## 2023-05-18 RX ADMIN — MIDAZOLAM HYDROCHLORIDE 4 MILLIGRAM(S): 1 INJECTION, SOLUTION INTRAMUSCULAR; INTRAVENOUS at 17:03

## 2023-05-18 RX ADMIN — MIDAZOLAM HYDROCHLORIDE 2 MILLIGRAM(S): 1 INJECTION, SOLUTION INTRAMUSCULAR; INTRAVENOUS at 17:04

## 2023-05-18 RX ADMIN — MIDAZOLAM HYDROCHLORIDE 2 MILLIGRAM(S): 1 INJECTION, SOLUTION INTRAMUSCULAR; INTRAVENOUS at 08:36

## 2023-05-18 RX ADMIN — PANTOPRAZOLE SODIUM 40 MILLIGRAM(S): 20 TABLET, DELAYED RELEASE ORAL at 12:28

## 2023-05-18 RX ADMIN — FENTANYL CITRATE 50 MICROGRAM(S): 50 INJECTION INTRAVENOUS at 18:04

## 2023-05-18 RX ADMIN — PROPOFOL 12.6 MICROGRAM(S)/KG/MIN: 10 INJECTION, EMULSION INTRAVENOUS at 07:32

## 2023-05-18 RX ADMIN — CISATRACURIUM BESYLATE 12.6 MICROGRAM(S)/KG/MIN: 2 INJECTION INTRAVENOUS at 23:35

## 2023-05-18 RX ADMIN — LACOSAMIDE 140 MILLIGRAM(S): 50 TABLET ORAL at 12:27

## 2023-05-18 RX ADMIN — FENTANYL CITRATE 25 MICROGRAM(S): 50 INJECTION INTRAVENOUS at 06:29

## 2023-05-18 RX ADMIN — CISATRACURIUM BESYLATE 10 MILLIGRAM(S): 2 INJECTION INTRAVENOUS at 16:02

## 2023-05-18 RX ADMIN — CISATRACURIUM BESYLATE 10 MILLIGRAM(S): 2 INJECTION INTRAVENOUS at 19:02

## 2023-05-18 RX ADMIN — LACOSAMIDE 150 MILLIGRAM(S): 50 TABLET ORAL at 23:39

## 2023-05-18 RX ADMIN — CISATRACURIUM BESYLATE 10 MILLIGRAM(S): 2 INJECTION INTRAVENOUS at 08:36

## 2023-05-18 RX ADMIN — DESMOPRESSIN ACETATE 220 MICROGRAM(S): 0.1 TABLET ORAL at 23:01

## 2023-05-18 RX ADMIN — Medication 50 MILLIEQUIVALENT(S): at 20:30

## 2023-05-18 RX ADMIN — MIDAZOLAM HYDROCHLORIDE 2 MILLIGRAM(S): 1 INJECTION, SOLUTION INTRAMUSCULAR; INTRAVENOUS at 15:14

## 2023-05-18 RX ADMIN — Medication 250 MILLIGRAM(S): at 06:16

## 2023-05-18 RX ADMIN — DEXMEDETOMIDINE HYDROCHLORIDE IN 0.9% SODIUM CHLORIDE 3.5 MICROGRAM(S)/KG/HR: 4 INJECTION INTRAVENOUS at 15:36

## 2023-05-18 RX ADMIN — FENTANYL CITRATE 25 MICROGRAM(S): 50 INJECTION INTRAVENOUS at 06:44

## 2023-05-18 RX ADMIN — BUMETANIDE 2 MILLIGRAM(S): 0.25 INJECTION INTRAMUSCULAR; INTRAVENOUS at 06:17

## 2023-05-18 RX ADMIN — FENTANYL CITRATE 50 MICROGRAM(S): 50 INJECTION INTRAVENOUS at 12:15

## 2023-05-18 RX ADMIN — Medication 50 MILLIEQUIVALENT(S): at 21:00

## 2023-05-18 RX ADMIN — CHLORHEXIDINE GLUCONATE 15 MILLILITER(S): 213 SOLUTION TOPICAL at 06:30

## 2023-05-18 RX ADMIN — FENTANYL CITRATE 50 MICROGRAM(S): 50 INJECTION INTRAVENOUS at 11:12

## 2023-05-18 RX ADMIN — Medication 100 MILLIEQUIVALENT(S): at 00:32

## 2023-05-18 RX ADMIN — PHENYLEPHRINE HYDROCHLORIDE 2.63 MICROGRAM(S)/KG/MIN: 10 INJECTION INTRAVENOUS at 22:59

## 2023-05-18 RX ADMIN — Medication 1 EACH: at 19:01

## 2023-05-18 RX ADMIN — FENTANYL CITRATE 50 MICROGRAM(S): 50 INJECTION INTRAVENOUS at 12:30

## 2023-05-18 RX ADMIN — FENTANYL CITRATE 50 MICROGRAM(S): 50 INJECTION INTRAVENOUS at 11:27

## 2023-05-18 RX ADMIN — CISATRACURIUM BESYLATE 10 MILLIGRAM(S): 2 INJECTION INTRAVENOUS at 21:00

## 2023-05-18 RX ADMIN — CISATRACURIUM BESYLATE 10 MILLIGRAM(S): 2 INJECTION INTRAVENOUS at 20:06

## 2023-05-18 RX ADMIN — Medication 50 MILLIEQUIVALENT(S): at 01:44

## 2023-05-18 RX ADMIN — MIDAZOLAM HYDROCHLORIDE 1.4 MG/KG/HR: 1 INJECTION, SOLUTION INTRAMUSCULAR; INTRAVENOUS at 08:40

## 2023-05-18 RX ADMIN — PROPOFOL 12.6 MICROGRAM(S)/KG/MIN: 10 INJECTION, EMULSION INTRAVENOUS at 02:27

## 2023-05-18 RX ADMIN — Medication 60 MILLIGRAM(S): at 23:35

## 2023-05-18 RX ADMIN — CASPOFUNGIN ACETATE 260 MILLIGRAM(S): 7 INJECTION, POWDER, LYOPHILIZED, FOR SOLUTION INTRAVENOUS at 00:49

## 2023-05-18 RX ADMIN — FENTANYL CITRATE 50 MICROGRAM(S): 50 INJECTION INTRAVENOUS at 18:34

## 2023-05-18 RX ADMIN — CHLORHEXIDINE GLUCONATE 1 APPLICATION(S): 213 SOLUTION TOPICAL at 06:30

## 2023-05-18 RX ADMIN — MEROPENEM 280 MILLIGRAM(S): 1 INJECTION INTRAVENOUS at 02:11

## 2023-05-18 RX ADMIN — CHLORHEXIDINE GLUCONATE 15 MILLILITER(S): 213 SOLUTION TOPICAL at 19:20

## 2023-05-18 RX ADMIN — CEFEPIME 100 MILLIGRAM(S): 1 INJECTION, POWDER, FOR SOLUTION INTRAMUSCULAR; INTRAVENOUS at 00:32

## 2023-05-18 RX ADMIN — Medication 81 MILLIGRAM(S): at 12:29

## 2023-05-18 RX ADMIN — Medication 250 MILLIGRAM(S): at 19:21

## 2023-05-18 RX ADMIN — PIPERACILLIN AND TAZOBACTAM 200 GRAM(S): 4; .5 INJECTION, POWDER, LYOPHILIZED, FOR SOLUTION INTRAVENOUS at 13:40

## 2023-05-18 RX ADMIN — PIPERACILLIN AND TAZOBACTAM 25 GRAM(S): 4; .5 INJECTION, POWDER, LYOPHILIZED, FOR SOLUTION INTRAVENOUS at 19:21

## 2023-05-18 NOTE — PROGRESS NOTE ADULT - SUBJECTIVE AND OBJECTIVE BOX
Subjective  No events overnight. No complaints currently.    ROS  As above, otherwise negative for constitutional/HEENT/CV/pulm/GI//MSK/neuro/derm/endocrine/psych.     MEDICATIONS  (STANDING):  aspirin  chewable 81 milliGRAM(s) Oral daily  cefepime   IVPB      cefepime   IVPB 2000 milliGRAM(s) IV Intermittent every 12 hours  chlorhexidine 0.12% Liquid 15 milliLiter(s) Oral Mucosa every 12 hours  chlorhexidine 2% Cloths 1 Application(s) Topical daily  cisatracurium Injectable 10 milliGRAM(s) IV Push once  dexMEDEtomidine Infusion 0.2 MICROgram(s)/kG/Hr (3.5 mL/Hr) IV Continuous <Continuous>  lacosamide IVPB 200 milliGRAM(s) IV Intermittent every 12 hours  midazolam Infusion 0.02 mG/kG/Hr (1.4 mL/Hr) IV Continuous <Continuous>  midazolam Injectable 2 milliGRAM(s) IV Push once  pantoprazole  Injectable 40 milliGRAM(s) IV Push daily  Parenteral Nutrition - Adult 1 Each (50 mL/Hr) TPN Continuous <Continuous>  propofol Infusion 30 MICROgram(s)/kG/Min (12.6 mL/Hr) IV Continuous <Continuous>  sodium chloride 0.9%. 1000 milliLiter(s) (10 mL/Hr) IV Continuous <Continuous>  vancomycin  IVPB 1000 milliGRAM(s) IV Intermittent every 12 hours    MEDICATIONS  (PRN):  fentaNYL    Injectable 25 MICROGram(s) IV Push every 3 hours PRN Severe Pain (7 - 10)      T(C): 37.6 (05-18-23 @ 07:00), Max: 38.9 (05-17-23 @ 22:00)  HR: 128 (05-18-23 @ 07:00) (83 - 148)  BP: --  RR: 29 (05-18-23 @ 07:00) (14 - 45)  SpO2: 100% (05-18-23 @ 07:00) (95% - 100%)  Wt(kg): --    Eyes open spontaneously. Conversational with appropriate sentences.  EOMI. Visual fields full. PERRL 3>2. Face symmetrical.  Full strength throughout.  Finger-nose-finger intact R/L.  Intact to light touch throughout.    CBC Full  -  ( 18 May 2023 02:46 )  WBC Count : 7.87 K/uL  RBC Count : 3.50 M/uL  Hemoglobin : 10.5 g/dL  Hematocrit : 32.5 %  Platelet Count - Automated : 156 K/uL  Mean Cell Volume : 92.9 fl  Mean Cell Hemoglobin : 30.0 pg  Mean Cell Hemoglobin Concentration : 32.3 gm/dL  Auto Neutrophil # : x  Auto Lymphocyte # : x  Auto Monocyte # : x  Auto Eosinophil # : x  Auto Basophil # : x  Auto Neutrophil % : x  Auto Lymphocyte % : x  Auto Monocyte % : x  Auto Eosinophil % : x  Auto Basophil % : x    05-18    139  |  109<H>  |  56<H>  ----------------------------<  135<H>  4.3   |  20<L>  |  1.33<H>    Ca    7.9<L>      18 May 2023 02:46  Phos  3.2     05-18  Mg     2.1     05-18    TPro  5.2<L>  /  Alb  2.6<L>  /  TBili  1.7<H>  /  DBili  x   /  AST  16  /  ALT  5<L>  /  AlkPhos  79  05-18    LIVER FUNCTIONS - ( 18 May 2023 02:46 )  Alb: 2.6 g/dL / Pro: 5.2 g/dL / ALK PHOS: 79 U/L / ALT: 5 U/L / AST: 16 U/L / GGT: x           PT/INR - ( 18 May 2023 02:46 )   PT: 16.3 sec;   INR: 1.37          PTT - ( 18 May 2023 02:46 )  PTT:34.7 sec      EEG: EPILEPSY PROGRESS NOTE:   Patient seen and examined at bedside, noted to be tachycardic to 130, and tachypneic to 33 on bedside monitor. Remain intubated and sedated on propofol 50mcg/kg/min. There were no report of seizure event overnight. This provider contact patient's neurologist, and recommendations appreciated. Patient received meopenem overnight, and would consider other agents as this drug class lowers the seizure threshold.     REVIEW OF SYSTEMS:   Unobtainable 2/2 medically induced coma    MEDICATIONS  (STANDING):  aspirin  chewable 81 milliGRAM(s) Oral daily  cefepime   IVPB      cefepime   IVPB 2000 milliGRAM(s) IV Intermittent every 12 hours  chlorhexidine 0.12% Liquid 15 milliLiter(s) Oral Mucosa every 12 hours  chlorhexidine 2% Cloths 1 Application(s) Topical daily  cisatracurium Injectable 10 milliGRAM(s) IV Push once  dexMEDEtomidine Infusion 0.2 MICROgram(s)/kG/Hr (3.5 mL/Hr) IV Continuous <Continuous>  lacosamide IVPB 200 milliGRAM(s) IV Intermittent every 12 hours  midazolam Infusion 0.02 mG/kG/Hr (1.4 mL/Hr) IV Continuous <Continuous>  midazolam Injectable 2 milliGRAM(s) IV Push once  pantoprazole  Injectable 40 milliGRAM(s) IV Push daily  Parenteral Nutrition - Adult 1 Each (50 mL/Hr) TPN Continuous <Continuous>  propofol Infusion 30 MICROgram(s)/kG/Min (12.6 mL/Hr) IV Continuous <Continuous>  sodium chloride 0.9%. 1000 milliLiter(s) (10 mL/Hr) IV Continuous <Continuous>  vancomycin  IVPB 1000 milliGRAM(s) IV Intermittent every 12 hours    MEDICATIONS  (PRN):  fentaNYL    Injectable 25 MICROGram(s) IV Push every 3 hours PRN Severe Pain (7 - 10)    VITAL SIGNS:   T(C): 37.6 (05-18-23 @ 07:00), Max: 38.9 (05-17-23 @ 22:00)  HR: 128 (05-18-23 @ 07:00) (83 - 148)  BP: --  RR: 29 (05-18-23 @ 07:00) (14 - 45)  SpO2: 100% (05-18-23 @ 07:00) (95% - 100%)  Wt(kg): --    PHYSICAL EXAM:   Eyes closed, pupils 3mm brisk, does not follow commands. No withdrawal to painful stimuli in all limbs. B/L LE 3+ pitting edema. +cough and gag reflex. 2+ reflexes in B/L UE and absent in B/L LE, toes mute.     LABS:   CBC Full  -  ( 18 May 2023 02:46 )  WBC Count : 7.87 K/uL  RBC Count : 3.50 M/uL  Hemoglobin : 10.5 g/dL  Hematocrit : 32.5 %  Platelet Count - Automated : 156 K/uL  Mean Cell Volume : 92.9 fl  Mean Cell Hemoglobin : 30.0 pg  Mean Cell Hemoglobin Concentration : 32.3 gm/dL  Auto Neutrophil # : x  Auto Lymphocyte # : x  Auto Monocyte # : x  Auto Eosinophil # : x  Auto Basophil # : x  Auto Neutrophil % : x  Auto Lymphocyte % : x  Auto Monocyte % : x  Auto Eosinophil % : x  Auto Basophil % : x    05-18    139  |  109<H>  |  56<H>  ----------------------------<  135<H>  4.3   |  20<L>  |  1.33<H>    Ca    7.9<L>      18 May 2023 02:46  Phos  3.2     05-18  Mg     2.1     05-18    TPro  5.2<L>  /  Alb  2.6<L>  /  TBili  1.7<H>  /  DBili  x   /  AST  16  /  ALT  5<L>  /  AlkPhos  79  05-18    LIVER FUNCTIONS - ( 18 May 2023 02:46 )  Alb: 2.6 g/dL / Pro: 5.2 g/dL / ALK PHOS: 79 U/L / ALT: 5 U/L / AST: 16 U/L / GGT: x           PT/INR - ( 18 May 2023 02:46 )   PT: 16.3 sec;   INR: 1.37     PTT - ( 18 May 2023 02:46 )  PTT:34.7 sec       EPILEPSY PROGRESS NOTE:   Patient seen and examined at bedside, noted to be tachycardic to 130, and tachypneic to 33 on bedside monitor. Remain intubated and sedated on propofol 50mcg/kg/min. There were no report of seizure event overnight. This provider contact patient's neurologist, and recommendations appreciated. Patient received meopenem overnight, and would consider other agents as this drug class lowers the seizure threshold.     REVIEW OF SYSTEMS:   Unobtainable 2/2 medically induced coma    MEDICATIONS  (STANDING):  aspirin  chewable 81 milliGRAM(s) Oral daily  cefepime   IVPB      cefepime   IVPB 2000 milliGRAM(s) IV Intermittent every 12 hours  chlorhexidine 0.12% Liquid 15 milliLiter(s) Oral Mucosa every 12 hours  chlorhexidine 2% Cloths 1 Application(s) Topical daily  cisatracurium Injectable 10 milliGRAM(s) IV Push once  dexMEDEtomidine Infusion 0.2 MICROgram(s)/kG/Hr (3.5 mL/Hr) IV Continuous <Continuous>  lacosamide IVPB 200 milliGRAM(s) IV Intermittent every 12 hours  midazolam Infusion 0.02 mG/kG/Hr (1.4 mL/Hr) IV Continuous <Continuous>  midazolam Injectable 2 milliGRAM(s) IV Push once  pantoprazole  Injectable 40 milliGRAM(s) IV Push daily  Parenteral Nutrition - Adult 1 Each (50 mL/Hr) TPN Continuous <Continuous>  propofol Infusion 30 MICROgram(s)/kG/Min (12.6 mL/Hr) IV Continuous <Continuous>  sodium chloride 0.9%. 1000 milliLiter(s) (10 mL/Hr) IV Continuous <Continuous>  vancomycin  IVPB 1000 milliGRAM(s) IV Intermittent every 12 hours    MEDICATIONS  (PRN):  fentaNYL    Injectable 25 MICROGram(s) IV Push every 3 hours PRN Severe Pain (7 - 10)    VITAL SIGNS:   T(C): 37.6 (05-18-23 @ 07:00), Max: 38.9 (05-17-23 @ 22:00)  HR: 128 (05-18-23 @ 07:00) (83 - 148)  BP: --  RR: 29 (05-18-23 @ 07:00) (14 - 45)  SpO2: 100% (05-18-23 @ 07:00) (95% - 100%)  Wt(kg): --    PHYSICAL EXAM:   Eyes closed, pupils 3mm brisk, does not follow commands. No withdrawal to painful stimuli in all limbs. B/L LE 3+ pitting edema. +cough and gag reflex. 2+ reflexes in B/L UE and absent in B/L LE, toes mute.     LABS:   CBC Full  -  ( 18 May 2023 02:46 )  WBC Count : 7.87 K/uL  RBC Count : 3.50 M/uL  Hemoglobin : 10.5 g/dL  Hematocrit : 32.5 %  Platelet Count - Automated : 156 K/uL  Mean Cell Volume : 92.9 fl  Mean Cell Hemoglobin : 30.0 pg  Mean Cell Hemoglobin Concentration : 32.3 gm/dL  Auto Neutrophil # : x  Auto Lymphocyte # : x  Auto Monocyte # : x  Auto Eosinophil # : x  Auto Basophil # : x  Auto Neutrophil % : x  Auto Lymphocyte % : x  Auto Monocyte % : x  Auto Eosinophil % : x  Auto Basophil % : x    05-18    139  |  109<H>  |  56<H>  ----------------------------<  135<H>  4.3   |  20<L>  |  1.33<H>    Ca    7.9<L>      18 May 2023 02:46  Phos  3.2     05-18  Mg     2.1     05-18    TPro  5.2<L>  /  Alb  2.6<L>  /  TBili  1.7<H>  /  DBili  x   /  AST  16  /  ALT  5<L>  /  AlkPhos  79  05-18    LIVER FUNCTIONS - ( 18 May 2023 02:46 )  Alb: 2.6 g/dL / Pro: 5.2 g/dL / ALK PHOS: 79 U/L / ALT: 5 U/L / AST: 16 U/L / GGT: x           PT/INR - ( 18 May 2023 02:46 )   PT: 16.3 sec;   INR: 1.37     PTT - ( 18 May 2023 02:46 )  PTT:34.7 sec    < from: CT Head No Cont (05.18.23 @ 11:11) >  Impression:    No acute intracranial hemorrhage, mass effect or demarcated territorial   infarction. High density material noted within the posterior nasopharynx   and nasal cavity likely secondary to reported history of recent oral   contrast ingestion..    --- End of Report ---    < end of copied text >     EPILEPSY PROGRESS NOTE:   Patient seen and examined at bedside, noted to be tachycardic to 130, and tachypneic to 33 on bedside monitor. Remain intubated and sedated on propofol 50mcg/kg/min. There were no report of seizure event overnight. This provider contact patient's neurologist, and recommendations appreciated. Febrile overnight to 102F, and patient received meropenem, and would consider other agents as this drug class lowers the seizure threshold.     REVIEW OF SYSTEMS:   Unobtainable 2/2 medically induced coma    MEDICATIONS  (STANDING):  aspirin  chewable 81 milliGRAM(s) Oral daily  cefepime   IVPB      cefepime   IVPB 2000 milliGRAM(s) IV Intermittent every 12 hours  chlorhexidine 0.12% Liquid 15 milliLiter(s) Oral Mucosa every 12 hours  chlorhexidine 2% Cloths 1 Application(s) Topical daily  cisatracurium Injectable 10 milliGRAM(s) IV Push once  dexMEDEtomidine Infusion 0.2 MICROgram(s)/kG/Hr (3.5 mL/Hr) IV Continuous <Continuous>  lacosamide IVPB 200 milliGRAM(s) IV Intermittent every 12 hours  midazolam Infusion 0.02 mG/kG/Hr (1.4 mL/Hr) IV Continuous <Continuous>  midazolam Injectable 2 milliGRAM(s) IV Push once  pantoprazole  Injectable 40 milliGRAM(s) IV Push daily  Parenteral Nutrition - Adult 1 Each (50 mL/Hr) TPN Continuous <Continuous>  propofol Infusion 30 MICROgram(s)/kG/Min (12.6 mL/Hr) IV Continuous <Continuous>  sodium chloride 0.9%. 1000 milliLiter(s) (10 mL/Hr) IV Continuous <Continuous>  vancomycin  IVPB 1000 milliGRAM(s) IV Intermittent every 12 hours    MEDICATIONS  (PRN):  fentaNYL    Injectable 25 MICROGram(s) IV Push every 3 hours PRN Severe Pain (7 - 10)    VITAL SIGNS:   T(C): 37.6 (05-18-23 @ 07:00), Max: 38.9 (05-17-23 @ 22:00)  HR: 128 (05-18-23 @ 07:00) (83 - 148)  BP: --  RR: 29 (05-18-23 @ 07:00) (14 - 45)  SpO2: 100% (05-18-23 @ 07:00) (95% - 100%)  Wt(kg): --    PHYSICAL EXAM:   Eyes closed, pupils 3mm brisk, does not follow commands. No withdrawal to painful stimuli in all limbs. B/L LE 3+ pitting edema. +cough and gag reflex. 2+ reflexes in B/L UE and absent in B/L LE, toes mute.     LABS:   CBC Full  -  ( 18 May 2023 02:46 )  WBC Count : 7.87 K/uL  RBC Count : 3.50 M/uL  Hemoglobin : 10.5 g/dL  Hematocrit : 32.5 %  Platelet Count - Automated : 156 K/uL  Mean Cell Volume : 92.9 fl  Mean Cell Hemoglobin : 30.0 pg  Mean Cell Hemoglobin Concentration : 32.3 gm/dL  Auto Neutrophil # : x  Auto Lymphocyte # : x  Auto Monocyte # : x  Auto Eosinophil # : x  Auto Basophil # : x  Auto Neutrophil % : x  Auto Lymphocyte % : x  Auto Monocyte % : x  Auto Eosinophil % : x  Auto Basophil % : x    05-18    139  |  109<H>  |  56<H>  ----------------------------<  135<H>  4.3   |  20<L>  |  1.33<H>    Ca    7.9<L>      18 May 2023 02:46  Phos  3.2     05-18  Mg     2.1     05-18    TPro  5.2<L>  /  Alb  2.6<L>  /  TBili  1.7<H>  /  DBili  x   /  AST  16  /  ALT  5<L>  /  AlkPhos  79  05-18    LIVER FUNCTIONS - ( 18 May 2023 02:46 )  Alb: 2.6 g/dL / Pro: 5.2 g/dL / ALK PHOS: 79 U/L / ALT: 5 U/L / AST: 16 U/L / GGT: x           PT/INR - ( 18 May 2023 02:46 )   PT: 16.3 sec;   INR: 1.37     PTT - ( 18 May 2023 02:46 )  PTT:34.7 sec    < from: CT Head No Cont (05.18.23 @ 11:11) >  Impression:    No acute intracranial hemorrhage, mass effect or demarcated territorial   infarction. High density material noted within the posterior nasopharynx   and nasal cavity likely secondary to reported history of recent oral   contrast ingestion..    --- End of Report ---    < end of copied text >

## 2023-05-18 NOTE — PROGRESS NOTE ADULT - SUBJECTIVE AND OBJECTIVE BOX
Subjective  No events overnight. No complaints currently.    ROS  As above, otherwise negative for constitutional/HEENT/CV/pulm/GI//MSK/neuro/derm/endocrine/psych.     MEDICATIONS  (STANDING):  aspirin  chewable 81 milliGRAM(s) Oral daily  cefepime   IVPB 2000 milliGRAM(s) IV Intermittent every 12 hours  cefepime   IVPB      chlorhexidine 0.12% Liquid 15 milliLiter(s) Oral Mucosa every 12 hours  chlorhexidine 2% Cloths 1 Application(s) Topical daily  dexMEDEtomidine Infusion 0.2 MICROgram(s)/kG/Hr (3.5 mL/Hr) IV Continuous <Continuous>  lacosamide IVPB 200 milliGRAM(s) IV Intermittent every 12 hours  midazolam Infusion 0.02 mG/kG/Hr (1.4 mL/Hr) IV Continuous <Continuous>  pantoprazole  Injectable 40 milliGRAM(s) IV Push daily  Parenteral Nutrition - Adult 1 Each (50 mL/Hr) TPN Continuous <Continuous>  propofol Infusion 30 MICROgram(s)/kG/Min (12.6 mL/Hr) IV Continuous <Continuous>  sodium chloride 0.9%. 1000 milliLiter(s) (10 mL/Hr) IV Continuous <Continuous>  vancomycin  IVPB 1000 milliGRAM(s) IV Intermittent every 12 hours    MEDICATIONS  (PRN):  fentaNYL    Injectable 25 MICROGram(s) IV Push every 3 hours PRN Severe Pain (7 - 10)      T(C): 37.6 (05-18-23 @ 07:00), Max: 38.9 (05-17-23 @ 22:00)  HR: 128 (05-18-23 @ 07:00) (83 - 148)  BP: --  RR: 29 (05-18-23 @ 07:00) (14 - 45)  SpO2: 100% (05-18-23 @ 07:00) (95% - 100%)  Wt(kg): --    Eyes open spontaneously. Conversational with appropriate sentences.  EOMI. Visual fields full. PERRL 3>2. Face symmetrical.  Full strength throughout.  Finger-nose-finger intact R/L.  Intact to light touch throughout.    CBC Full  -  ( 18 May 2023 02:46 )  WBC Count : 7.87 K/uL  RBC Count : 3.50 M/uL  Hemoglobin : 10.5 g/dL  Hematocrit : 32.5 %  Platelet Count - Automated : 156 K/uL  Mean Cell Volume : 92.9 fl  Mean Cell Hemoglobin : 30.0 pg  Mean Cell Hemoglobin Concentration : 32.3 gm/dL  Auto Neutrophil # : x  Auto Lymphocyte # : x  Auto Monocyte # : x  Auto Eosinophil # : x  Auto Basophil # : x  Auto Neutrophil % : x  Auto Lymphocyte % : x  Auto Monocyte % : x  Auto Eosinophil % : x  Auto Basophil % : x    05-18    139  |  109<H>  |  56<H>  ----------------------------<  135<H>  4.3   |  20<L>  |  1.33<H>    Ca    7.9<L>      18 May 2023 02:46  Phos  3.2     05-18  Mg     2.1     05-18    TPro  5.2<L>  /  Alb  2.6<L>  /  TBili  1.7<H>  /  DBili  x   /  AST  16  /  ALT  5<L>  /  AlkPhos  79  05-18    LIVER FUNCTIONS - ( 18 May 2023 02:46 )  Alb: 2.6 g/dL / Pro: 5.2 g/dL / ALK PHOS: 79 U/L / ALT: 5 U/L / AST: 16 U/L / GGT: x           PT/INR - ( 18 May 2023 02:46 )   PT: 16.3 sec;   INR: 1.37          PTT - ( 18 May 2023 02:46 )  PTT:34.7 sec      EEG:

## 2023-05-18 NOTE — CONSULT NOTE ADULT - TIME BILLING
Briefly, 54 yo male hx seizure disorder, aortic dissection s/p AVR, aortic graft revision 3/20/2023, second stage TEVAR 5/5/2023, course c/b peripancreatic/RP hemorrhage/hematoma formation; Klebsiella BSI ?2/2 infected hematoma; given adjacent aortic graft, would also need to evaluate for vascular graft infection--f/u CTA and if unrevealing for infection at graft, then would proceed with PET CT. Given sz disorder, advise change cefepime to Zosyn (Klebsiella PCR without CTX-M gene detected so unlikely ESBL ); also with c/f ?VAP s/p BAL 5/17.
evaluation and management of peripancreatic fluid collections, review of labs and imaging, discussion with consultants, documentation

## 2023-05-18 NOTE — PROGRESS NOTE ADULT - ASSESSMENT
52 M w/ PMHx of HTN, type A aortic dissection s/p Dacron grafts, AV resuspension in 2013, CAD s/p CABG x 1 SVG to RCA (5/2013 with Dr. Medina), seizure disorder who was admitted for surgical mgmt of progression of aneurysmal disease. Recent admission 3/20-4/14 during which patient underwent replacement of transverse aortic arch, second stage TEVAR and aorto-axillary bypass, AV replacement (bio 23mm), and CABG x 1 (SVG-RCA) EF 75% (Ignacio, 3/20). Post op cb severe cardiogenic and vasogenic shock, TREY requiring CVVHD and temporary dialysis. Camr off RRT and discharged home mid April.presented to [facility] ED 4/27 for syncope vs seizure episode at home. negative neuro work up for Seizures AED dose adjusted however imaging at that rime revealed recent graft endoleak, Admitted now for surgical mgmt.   S/p Second stage thoracic endovascular aortic repair  5/5 transient LE weakness, Improved   LD clamped and Dc's 5/8  worsening leukocytosis- CT C/A/P suggestive of acute pancreatitis with 2 fluid collections / Lipase on admission > 1000 down to 95 5/8   dropping H&H and LUQ hematoma--hemorrhagic pancreatitis   A/p  No recent seizures, valproate titrated off given association with pancreatitis/ Vimpat dose increased to 250 Mg BID/EEG in place  Deeply sedated and paralyzed on propofol, versed and nimbex   Fever curve decreasing--> Repeat CTA with no evidence of graft infection continue vanc/Zosyn( blood clx and sputum + for klebsiella/ No ESBL)  Possible PET-CT per ID recs will confirm in am   New Central line and [place] placed, started on Mil and Armaan during the day for high filling pressures/ Lactate and LFTs remained normal)   H&H stable/ HCT 35%--> To monitor, no need for blood or blood products transfusion at this time   TREY improving Cr down to 1.2/brisk diuretic response/ Continue PRN diuretics for neutral to sl Neg FB   On TPN/Sugars and LFTs normal except mild hyperbili- Hypo k and Hypo mg repleted   OGT placed for decompression and vomiting-- continue on LIWS   ETT upsized and new central line placed 5/18    Patient remained critically ill and requires ICU care, prognosis guarded.   ATTENDING: I have personally and independently provided 30 Min of critical care services.

## 2023-05-18 NOTE — CONSULT NOTE ADULT - SUBJECTIVE AND OBJECTIVE BOX
INFECTIOUS DISEASES INITIAL CONSULT NOTE    HPI:  51 YO Male, current every day marijuana use, w/ PMHx of HTN, type A aortic dissection s/p Dacron grafts, AV resuspension in , CAD s/p CABG x 1 SVG to RCA (2013 with Dr. Medina), seizure disorder (last episode on 22) who was admitted for surgical mgmt of progression of aneurysmal disease. Recent admission 3/20- during which patient underwent replacement of transverse aortic arch, second stage thoracic endovascular aortic repair, aorto-axillary bypass, AV replacement (bio 23mm), and CABG x 1 (SVG-RCA) EF 75% (Ignacio, 3/20). Post op cb lactic acidosis, severe cardiogenic and vasogenic shock, TREY requiring CVVHD and temporary dialysis requirement. Patient presented to [facility] ED  for syncope vs seizure episode at home, falling onto back of head. Nuerological workup proformed during that visit revealed a negative EEG, but given clinical picture of seizures, pt started on vimpat and depakote. Imaging preformed during that admission did no require acute surgical intervention on endoleak.   Pt presented to [facility] ED last night complaning of worsening epigastric pain. CTAP revealed continued endoleak. Sent to [facility] for further evaluation. Pt will be taken to the OR with Dr. Pierre this morning for a completion TEVAR. Pt consented, OR aware.         (05 May 2023 09:41)      PAST MEDICAL & SURGICAL HISTORY:  HTN (hypertension)      Aortic dissection      CAD (coronary artery disease)      Seizure disorder      S/P aortic bifurcation bypass graft      S/P CABG x 1            Review of Systems:   Constitutional, eyes, ENT, cardiovascular, respiratory, gastrointestinal, genitourinary, integumentary, neurological, psychiatric and heme/lymph are otherwise negative other than noted above       ANTIBIOTICS:  MEDICATIONS  (STANDING):  aspirin  chewable 81 milliGRAM(s) Oral daily  chlorhexidine 0.12% Liquid 15 milliLiter(s) Oral Mucosa every 12 hours  chlorhexidine 2% Cloths 1 Application(s) Topical daily  cisatracurium Injectable 10 milliGRAM(s) IV Push once  dexMEDEtomidine Infusion 0.2 MICROgram(s)/kG/Hr (3.5 mL/Hr) IV Continuous <Continuous>  fentaNYL    Injectable 50 MICROGram(s) IV Push once  lacosamide IVPB 250 milliGRAM(s) IV Intermittent every 12 hours  midazolam Infusion 0.02 mG/kG/Hr (1.4 mL/Hr) IV Continuous <Continuous>  midazolam Injectable 4 milliGRAM(s) IV Push once  pantoprazole  Injectable 40 milliGRAM(s) IV Push daily  Parenteral Nutrition - Adult 1 Each (70 mL/Hr) TPN Continuous <Continuous>  Parenteral Nutrition - Adult 1 Each (50 mL/Hr) TPN Continuous <Continuous>  piperacillin/tazobactam IVPB.- 3.375 Gram(s) IV Intermittent once  sodium chloride 0.9%. 1000 milliLiter(s) (10 mL/Hr) IV Continuous <Continuous>  vancomycin  IVPB 1000 milliGRAM(s) IV Intermittent every 12 hours    MEDICATIONS  (PRN):  fentaNYL    Injectable 25 MICROGram(s) IV Push every 3 hours PRN Severe Pain (7 - 10)      Allergies    No Known Allergies    Intolerances        SOCIAL HISTORY:    FAMILY HISTORY:  No pertinent family history in first degree relatives     no FH leading to current infection    Vital Signs Last 24 Hrs  T(C): 36.2 (18 May 2023 14:16), Max: 38.9 (17 May 2023 22:00)  T(F): 97.2 (18 May 2023 14:16), Max: 102 (17 May 2023 22:00)  HR: 136 (18 May 2023 15:00) (111 - 148)  BP: --  BP(mean): --  RR: 24 (18 May 2023 15:00) (14 - 45)  SpO2: 95% (18 May 2023 15:00) (95% - 100%)    Parameters below as of 18 May 2023 15:00  Patient On (Oxygen Delivery Method): ventilator    O2 Concentration (%): 50    23 @ 07:01  -  23 @ 07:00  --------------------------------------------------------  IN: 4386.5 mL / OUT: 2680 mL / NET: 1706.5 mL    23 @ 07:01  -  23 @ 17:25  --------------------------------------------------------  IN: 965.3 mL / OUT: 1635 mL / NET: -669.7 mL        PHYSICAL EXAM:  Constitutional: intubated, sedated  Eyes: the sclera and conjunctiva were normal.   ENT: the ears and nose were normal in appearance.   Neck: the appearance of the neck was normal and the neck was supple.   Pulmonary: no respiratory distress and lungs were clear to auscultation bilaterally.   Heart: heart rate was normal and rhythm regular, normal S1 and S2  Vascular:. there was no peripheral edema  Abdomen: mild distention, no rigidity   Neurological: no focal deficits.   Psychiatric: the affect was normal      LABS:                        12.1   10.68 )-----------( 199      ( 18 May 2023 09:36 )             36.9     05-18    138  |  108  |  55<H>  ----------------------------<  129<H>  4.1   |  21<L>  |  1.27    Ca    8.9      18 May 2023 09:36  Phos  3.3     05-18  Mg     2.1     05-18    TPro  6.3  /  Alb  2.8<L>  /  TBili  1.8<H>  /  DBili  x   /  AST  16  /  ALT  6<L>  /  AlkPhos  89  05-18    PT/INR - ( 18 May 2023 09:36 )   PT: 16.0 sec;   INR: 1.34          PTT - ( 18 May 2023 09:36 )  PTT:34.3 sec  Urinalysis Basic - ( 17 May 2023 20:04 )    Color: Yellow / Appearance: Clear / S.020 / pH: x  Gluc: x / Ketone: NEGATIVE  / Bili: Negative / Urobili: 0.2 E.U./dL   Blood: x / Protein: 100 mg/dL / Nitrite: NEGATIVE   Leuk Esterase: NEGATIVE / RBC: 5-10 /HPF / WBC < 5 /HPF   Sq Epi: x / Non Sq Epi: x / Bacteria: Present /HPF        MICROBIOLOGY:    RADIOLOGY & ADDITIONAL STUDIES:

## 2023-05-18 NOTE — PROGRESS NOTE ADULT - SUBJECTIVE AND OBJECTIVE BOX
SUBJECTIVE: Patient seen and evaluated.        MEDICATIONS  (STANDING):  aspirin  chewable 81 milliGRAM(s) Oral daily  cefepime   IVPB 2000 milliGRAM(s) IV Intermittent every 12 hours  cefepime   IVPB      chlorhexidine 0.12% Liquid 15 milliLiter(s) Oral Mucosa every 12 hours  chlorhexidine 2% Cloths 1 Application(s) Topical daily  dexMEDEtomidine Infusion 0.2 MICROgram(s)/kG/Hr (3.5 mL/Hr) IV Continuous <Continuous>  lacosamide IVPB 200 milliGRAM(s) IV Intermittent every 12 hours  pantoprazole  Injectable 40 milliGRAM(s) IV Push daily  Parenteral Nutrition - Adult 1 Each (50 mL/Hr) TPN Continuous <Continuous>  propofol Infusion 30 MICROgram(s)/kG/Min (12.6 mL/Hr) IV Continuous <Continuous>  sodium chloride 0.9%. 1000 milliLiter(s) (10 mL/Hr) IV Continuous <Continuous>  vancomycin  IVPB 1000 milliGRAM(s) IV Intermittent every 12 hours    MEDICATIONS  (PRN):  fentaNYL    Injectable 25 MICROGram(s) IV Push every 3 hours PRN Severe Pain (7 - 10)      Vital Signs Last 24 Hrs  T(C): 38.7 (17 May 2023 23:00), Max: 38.9 (17 May 2023 22:00)  T(F): 101.7 (17 May 2023 23:00), Max: 102 (17 May 2023 22:00)  HR: 123 (18 May 2023 06:00) (83 - 148)  BP: 122/78 (17 May 2023 08:25) (122/78 - 122/78)  BP(mean): 96 (17 May 2023 08:25) (96 - 96)  RR: 14 (18 May 2023 06:00) (14 - 45)  SpO2: 100% (18 May 2023 06:00) (91% - 100%)    Parameters below as of 18 May 2023 07:00  Patient On (Oxygen Delivery Method): ventilator        Physical Exam:  General: Intubated and sedated  Pulmonary: On vent  Cardiovascular: Reg rate  Abdominal: soft, mild to mod distention      I&O's Summary    17 May 2023 07:01  -  18 May 2023 07:00  --------------------------------------------------------  IN: 4305.5 mL / OUT: 2305 mL / NET: 2000.5 mL        LABS:                        10.5   7.87  )-----------( 156      ( 18 May 2023 02:46 )             32.5     05-18    139  |  109<H>  |  56<H>  ----------------------------<  135<H>  4.3   |  20<L>  |  1.33<H>    Ca    7.9<L>      18 May 2023 02:46  Phos  3.2     05-18  Mg     2.1     05-18    TPro  5.2<L>  /  Alb  2.6<L>  /  TBili  1.7<H>  /  DBili  x   /  AST  16  /  ALT  5<L>  /  AlkPhos  79  05-18    PT/INR - ( 18 May 2023 02:46 )   PT: 16.3 sec;   INR: 1.37          PTT - ( 18 May 2023 02:46 )  PTT:34.7 sec  Urinalysis Basic - ( 17 May 2023 20:04 )    Color: Yellow / Appearance: Clear / S.020 / pH: x  Gluc: x / Ketone: NEGATIVE  / Bili: Negative / Urobili: 0.2 E.U./dL   Blood: x / Protein: 100 mg/dL / Nitrite: NEGATIVE   Leuk Esterase: NEGATIVE / RBC: 5-10 /HPF / WBC < 5 /HPF   Sq Epi: x / Non Sq Epi: x / Bacteria: Present /HPF      CAPILLARY BLOOD GLUCOSE        LIVER FUNCTIONS - ( 18 May 2023 02:46 )  Alb: 2.6 g/dL / Pro: 5.2 g/dL / ALK PHOS: 79 U/L / ALT: 5 U/L / AST: 16 U/L / GGT: x             RADIOLOGY & ADDITIONAL STUDIES:

## 2023-05-18 NOTE — PROGRESS NOTE ADULT - SUBJECTIVE AND OBJECTIVE BOX
INTERVAL COURSER  Repeat CTA with improving hematoma, no evidence of graft infection   Afebrile all day/ cefepime changed to zosyn also on Vancomycin  ETT upsized  Currently sedated and paralyzed foe hemodynamic instability and respiratory drive  Remained on TPN  New central line and swan placed/ started on Mil and Gwen for high PA pressures     VITALS  Vital Signs Last 24 Hrs  T(C): 35.6 (18 May 2023 22:50), Max: 37.6 (18 May 2023 07:00)  T(F): 96.1 (18 May 2023 22:50), Max: 99.7 (18 May 2023 07:00)  HR: 92 (19 May 2023 02:00) (92 - 163)  BP: 124/75  BP(mean): 97  RR: 20 (19 May 2023 02:00) (0 - 41)  SpO2: 100% (19 May 2023 02:00) (94% - 100%)    Parameters below as of 19 May 2023 02:00  Patient On (Oxygen Delivery Method): ventilator    O2 Concentration (%): 100    I&O's Summary  17 May 2023 07:01  -  18 May 2023 07:00  --------------------------------------------------------  IN: 4386.5 mL / OUT: 2680 mL / NET: 1706.5 mL    18 May 2023 07:01  -  19 May 2023 02:48  --------------------------------------------------------  IN: 3002 mL / OUT: 3870 mL / NET: -868 mL      CAPILLARY BLOOD GLUCOSE    PHYSICAL EXAM  General: Sedated and paralyzed   Respiratory: Rhonchorous   Cardiovascular: Regular rhythm/rate, systolic murmur +  Gastrointestinal: firm/ mildly distended   Extremities: Warm, anasarcic     IMAGING/EKG/ETC  Reviewed.

## 2023-05-18 NOTE — PROGRESS NOTE ADULT - ASSESSMENT
53 year-old male with PMH HTN, type A aortic dissection s/p Dacron grafts, AV resuspension in 2013, CAD s/p CABG x 1 SVG to RCA (5/2013 with Dr. Medina), seizure disorder (last episode on 7/4/22) S/P replacement of transverse aortic arch, second stage thoracic endovascular aortic repair, aorto-axillary bypass, AV replacement (bio 23mm), in APr 2023 and CABG x 1 (SVG-RCA) EF 75% (Ignacio, 3/20) now s/p 2nd state TEVAR on 5/5, and was found to have acute pancreatitis with large peripancreatic/perigastric fluid collections on CTA abdomen on 5/8.     Epilepsy team following for suspected seizure event on 5/14/23 that did not correlate with findings on EEG recordings. Also concerns for drug-related (depakote) hemorrhagic pancreatitis, and hereby the medication has been weaned.     RECOMMENDATIONS:   - Continue vEEG monitoring   - D/C Depakote  - Continue Vimpat 200mg Q12hrs (slightly supratherapeutic 13.15 on 5/13)  - Ativan 2mg IVP for seizures > 2mins  - Keep Mg>2 and K >4.   - Management per primary team.  53 year-old male with PMH HTN, type A aortic dissection s/p Dacron grafts, AV resuspension in 2013, CAD s/p CABG x 1 SVG to RCA (5/2013 with Dr. Medina), seizure disorder (last episode on 7/4/22) S/P replacement of transverse aortic arch, second stage thoracic endovascular aortic repair, aorto-axillary bypass, AV replacement (bio 23mm), in APr 2023 and CABG x 1 (SVG-RCA) EF 75% (Ignacio, 3/20) now s/p 2nd state TEVAR on 5/5, and was found to have acute pancreatitis with large peripancreatic/perigastric fluid collections on CTA abdomen on 5/8.     Epilepsy team following for suspected seizure event on 5/14/23 that did not correlate with findings on EEG recordings. Also concerns for drug-related (depakote) hemorrhagic pancreatitis, and hereby the medication has been weaned. CTH obtained on 5/18 showed acute pathology.     RECOMMENDATIONS:   - Continue vEEG monitoring   - D/C Depakote  - Please increase Vimpat 200mg Q12hrs to 250mg Q12hrs (slightly supratherapeutic 13.15 on 5/13)  - Please obtain lacosamide level after the 4th dose  - Ativan 2mg IVP for seizures > 2mins  - Keep Mg>2 and K >4.   - Management per primary team.

## 2023-05-18 NOTE — PROGRESS NOTE ADULT - SUBJECTIVE AND OBJECTIVE BOX
CTICU  CRITICAL  CARE  attending     Hand off received 					   Pertinent clinical, laboratory, radiographic, hemodynamic, echocardiographic, respiratory data, microbiologic data and chart were reviewed and analyzed frequently throughout the course of the day and night  Patient seen and examined with CTS/ SH attending at bedside  Pt is a 53y , Male, HEALTH ISSUES - PROBLEM Dx:      , FAMILY HISTORY:  No pertinent family history in first degree relatives    PAST MEDICAL & SURGICAL HISTORY:  HTN (hypertension)      Aortic dissection      CAD (coronary artery disease)      Seizure disorder      S/P aortic bifurcation bypass graft      S/P CABG x 1        Patient is a 53y old  Male who presents with a chief complaint of endoleak, abdominal pain (19 May 2023 08:47)      14 system review was unremarkable    Vital signs, hemodynamic and respiratory parameters were reviewed from the bedside nursing flowsheet.  ICU Vital Signs Last 24 Hrs  T(C): 36.1 (19 May 2023 08:27), Max: 36.3 (19 May 2023 05:19)  T(F): 96.9 (19 May 2023 08:27), Max: 97.3 (19 May 2023 05:19)  HR: 91 (19 May 2023 09:10) (83 - 163)  BP: --  BP(mean): --  ABP: 111/62 (19 May 2023 09:00) (78/50 - 168/105)  ABP(mean): 82 (19 May 2023 09:00) (57 - 133)  RR: 20 (19 May 2023 09:00) (0 - 41)  SpO2: 100% (19 May 2023 09:10) (94% - 100%)    O2 Parameters below as of 19 May 2023 09:00  Patient On (Oxygen Delivery Method): ventilator    O2 Concentration (%): 60      Adult Advanced Hemodynamics Last 24 Hrs  CVP(mm Hg): 10 (19 May 2023 09:00) (-13 - 25)  CVP(cm H2O): --  CO: --  CI: --  PA: --  PA(mean): --  PCWP: --  SVR: --  SVRI: --  PVR: --  PVRI: --, ABG - ( 19 May 2023 09:00 )  pH, Arterial: 7.40  pH, Blood: x     /  pCO2: 40    /  pO2: 126   / HCO3: 25    / Base Excess: 0.0   /  SaO2: 99.8              Mode: AC/ CMV (Assist Control/ Continuous Mandatory Ventilation)  RR (machine): 20  TV (machine): 500  FiO2: 60  PEEP: 8  ITime: 1  MAP: 13  PIP: 24    Intake and output was reviewed and the fluid balance was calculated  Daily     Daily   I&O's Summary    18 May 2023 07:01  -  19 May 2023 07:00  --------------------------------------------------------  IN: 4065.9 mL / OUT: 4920 mL / NET: -854.1 mL    19 May 2023 07:01  -  19 May 2023 09:18  --------------------------------------------------------  IN: 658.6 mL / OUT: 175 mL / NET: 483.6 mL        All lines and drain sites were assessed  Glycemic trend was reviewedCAPILLARY BLOOD GLUCOSE        No acute change in mental status  Auscultation of the chest reveals equal bs  Abdomen is soft  Extremities are warm and well perfused  Wounds appear clean and unremarkable  Antibiotics are periop    labs  CBC Full  -  ( 19 May 2023 04:15 )  WBC Count : 11.31 K/uL  RBC Count : 2.84 M/uL  Hemoglobin : 8.5 g/dL  Hematocrit : 26.0 %  Platelet Count - Automated : 157 K/uL  Mean Cell Volume : 91.5 fl  Mean Cell Hemoglobin : 29.9 pg  Mean Cell Hemoglobin Concentration : 32.7 gm/dL  Auto Neutrophil # : x  Auto Lymphocyte # : x  Auto Monocyte # : x  Auto Eosinophil # : x  Auto Basophil # : x  Auto Neutrophil % : x  Auto Lymphocyte % : x  Auto Monocyte % : x  Auto Eosinophil % : x  Auto Basophil % : x    05-19    140  |  107  |  54<H>  ----------------------------<  175<H>  3.2<L>   |  23  |  1.27    Ca    8.6      19 May 2023 03:33  Phos  3.4     05-19  Mg     1.8     05-19    TPro  5.7<L>  /  Alb  2.7<L>  /  TBili  2.3<H>  /  DBili  x   /  AST  16  /  ALT  7<L>  /  AlkPhos  64  05-19    PT/INR - ( 19 May 2023 03:33 )   PT: 16.2 sec;   INR: 1.36          PTT - ( 19 May 2023 03:33 )  PTT:31.6 sec  The current medications were reviewed   MEDICATIONS  (STANDING):  aspirin  chewable 81 milliGRAM(s) Oral daily  chlorhexidine 0.12% Liquid 15 milliLiter(s) Oral Mucosa every 12 hours  chlorhexidine 2% Cloths 1 Application(s) Topical daily  fentaNYL    Injectable 50 MICROGram(s) IV Push once  lacosamide IVPB 250 milliGRAM(s) IV Intermittent every 12 hours  milrinone Infusion 0.375 MICROgram(s)/kG/Min (7.88 mL/Hr) IV Continuous <Continuous>  pantoprazole  Injectable 40 milliGRAM(s) IV Push daily  Parenteral Nutrition - Adult 1 Each (70 mL/Hr) TPN Continuous <Continuous>  Parenteral Nutrition - Adult 1 Each (70 mL/Hr) TPN Continuous <Continuous>  phenylephrine    Infusion 0.1 MICROgram(s)/kG/Min (2.63 mL/Hr) IV Continuous <Continuous>  piperacillin/tazobactam IVPB.. 3.375 Gram(s) IV Intermittent every 8 hours  propofol Infusion 10 MICROgram(s)/kG/Min (4.2 mL/Hr) IV Continuous <Continuous>  sodium chloride 0.9%. 1000 milliLiter(s) (10 mL/Hr) IV Continuous <Continuous>  vancomycin  IVPB 1000 milliGRAM(s) IV Intermittent every 12 hours    MEDICATIONS  (PRN):  fentaNYL    Injectable 25 MICROGram(s) IV Push every 3 hours PRN Severe Pain (7 - 10)       PROBLEM LIST/ ASSESSMENT:  HEALTH ISSUES - PROBLEM Dx:      ,   Patient is a 53y old  Male who presents with a chief complaint of endoleak, abdominal pain (19 May 2023 08:47)     s/p cardiac surgery    Acute systolic and diastolic heart failure evidenced by SOB and parenchymal infiltrates; will treat with diuresis    Cardiogenic shock on ionotropy    Vasogenic shock due to hypotension in the cticu , will keep on pressors      Acute respiratory failure ruled in due to hypercapnea on abg will treat with mechanical ventilation    Acute respiratory failure ruled in due to prolonged mechanical ventilation > 24 hrs on the vent due to failure to wean due to weak respiratory mechanics    Toxic metabolic encephalopathy ; sundowning due to anesthesia pain medications    Acidosis evidenced by anion gap and negative base excess    Acute kidney injury - creatinine > 0.3 due to combined prerenal and intrarenal factors can presume ATN      Moderate protein calorie malutrition        My plan includes :    upsize ett to prevent aspiration    place new lines    primacor Armaan     pan CT     appreciate dr stevens and dr blakely recs   close hemodynamic, ventilatory and drain monitoring and management per post op routine    Monitor for arrhythmias and monitor parameters for organ perfusion  beta blockade not administered due to hemodynamic instability and bradycardia  monitor neurologic status  Head of the bed should remain elevated to 45 deg .   chest PT and IS will be encouraged  monitor adequacy of oxygenation and ventilation and attempt to wean oxygen  antibiotic regimen will be tailored to the clinical, laboratory and microbiologic data  Nutritional goals will be met using po eventually , ensure adequate caloric intake and montior the same  Stress ulcer and VTE prophylaxis will be achieved    Glycemic control is satisfactory  Electrolytes have been repleted as necessary and wound care has been carried out. Pain control has been achieved.   agressive physical therapy and early mobility and ambulation goals will be met   The family was updated about the course and plan  CRITICAL CARE TIME Upon my evaluation, this patient had a high probability of imminent or life-threatening deterioration due to the above problems which required my direct attention, intervention, and personal management.  I have personally provided 110 minutes of critical care time exclusive of time spent on separately billable procedures. Time included review of laboratory data, radiology results, discussion with consultants, and monitoring for potential decompensation. Interventions were performed as documented abovepersonally provided by me  in evaluation and management, reassessments, review and interpretation of labs and x-rays, ventilator and hemodynamic management, formulating a plan and coordinating care: ___110___ MIN.  Time does not include procedural time.    CTICU ATTENDING     					    Bubba Craft MD

## 2023-05-18 NOTE — CONSULT NOTE ADULT - ASSESSMENT
51 YO Male, current every day marijuana use, w/ PMHx of HTN, type A aortic dissection s/p Dacron grafts, AV resuspension in 2013, CAD s/p CABG x 1 SVG to RCA (5/2013 with Dr. Medina), seizure disorder (last episode on 7/4/22) who was admitted for surgical mgmt of progression of aneurysmal disease ID consulted for klebsiella bacteremia    #Klebsiella Bacteremia  patient w/ necrotizing pancreatitis with extension of hematoma into the retroperitoneal space possibly infected   - Obtain PET/CT to assess prosthetic devices for infection  - f/u culture data  - Advise to start Zosyn 3.375g IV q8h   - advise against any fluoroquinolones due to increased risk of aortic aneurysmal rupture  - stop Cefepime 2/2 to lowering of seizure threshold  - stop Vancomycin      Team 1 will continue to follow

## 2023-05-18 NOTE — EEG REPORT - NS EEG TEXT BOX
Daily Updates (from 07:00 am until 07:00 am):  Day 5 5/17-5/18/2023:   Pertinent medications: Depakote 250mg BID, LCM 200mg BID  Background:  continuous, with predominantly theta > delta frequencies.  Voltage:  Normal (20+ uV)  Organization:  Rudimentary  Posterior Dominant Rhythm:  <7 Hz symmetric, well-organized, and poorly-modulated  Sleep:  Symmetric, synchronous spindles and K complexes.  Focal abnormalities:  No persistent asymmetries of voltage or frequency.  Spontaneous Activity:  No epileptiform discharges  Choose an item. Choose an item.  Symmetry:  Bilaterally symmetric  Events: none  Provocations:  5)	Hyperventilation: was not performed.  6)	Photic stimulation: was not performed.  Impression/clinical correlation:   Abnormal continuous EEG  3)	moderate generalized slowing suggestive of diffuse or multifocal cerebral dysfunction     Dary Flores MD  Attending Neurologist, [facility]

## 2023-05-18 NOTE — PROGRESS NOTE ADULT - ASSESSMENT
This is a 54yo Male pt with PMH HTN, type A aortic dissection s/p Dacron grafts, AV resuspension in 2013, CAD s/p CABG x 1 SVG to RCA (5/2013 with Dr. Medina), seizure disorder (last episode on 7/4/22) S/P replacement of transverse aortic arch, second stage thoracic endovascular aortic repair, aorto-axillary bypass, AV replacement (bio 23mm), in APr 2023 and CABG x 1 (SVG-RCA) EF 75% (Ignacoi, 3/20) now s/p 2nd state TEVAR on 5/5 for whom surgery was consulted for finding of acute pancreatitis with large peripancreatic/perigastric fluid collections on CTA abdomen 5/8 then acute hemorrhage starting 5/12/23 requiring 11 U pRBC over last 48 hours but with negative mesenteric angiogram 5/13/23 for whom hepatobiliary surgery was reconsulted for possible hemorrhagic pancreatitis. This is a 52yo Male pt with PMH HTN, type A aortic dissection s/p Dacron grafts, AV resuspension in 2013, CAD s/p CABG x 1 SVG to RCA (5/2013 with Dr. Medina), seizure disorder (last episode on 7/4/22) S/P replacement of transverse aortic arch, second stage thoracic endovascular aortic repair, aorto-axillary bypass, AV replacement (bio 23mm), in APr 2023 and CABG x 1 (SVG-RCA) EF 75% (Ignacio, 3/20) now s/p 2nd state TEVAR on 5/5 for whom surgery was consulted for finding of acute pancreatitis with large peripancreatic/perigastric fluid collections on CTA abdomen 5/8 then acute hemorrhage starting 5/12/23 requiring 11 U pRBC over last 48 hours but with negative mesenteric angiogram 5/13/23 for whom hepatobiliary surgery was reconsulted for possible hemorrhagic pancreatitis.    - please obtain CT chest abdomen and pelvis with IV contrast   - Surgery Team 1C will continue to follow. Please page Team 1 with questions/clinical changes. [phone]

## 2023-05-18 NOTE — PROGRESS NOTE ADULT - CRITICAL CARE ATTENDING COMMENT
History of epilepsy previously on VA 1 g BID and LCM 100mg BID.  Patient seen and evaluated 5/28  intubated on sedation   Complicated hospital course, found to have hemorrhagic pancreatitis  Cases of life-threatening pancreatitis have been reported in patients receiving VA, so was tapered off.  EEG has been negative for seizure.  Continue LCM 250mg BID History of epilepsy previously on VA 1 g BID and LCM 100mg BID.  Patient seen and evaluated 5/18  intubated on sedation   Complicated hospital course, found to have hemorrhagic pancreatitis  Cases of life-threatening pancreatitis have been reported in patients receiving VA, so was tapered off.  EEG has been negative for seizure.  Plan as above  Outpatient neurologist reported that patient did not do well on Keppra (side effects and seizures not controlled) so will consider alternative options of requires second agent.

## 2023-05-19 LAB
-  AMPICILLIN/SULBACTAM: SIGNIFICANT CHANGE UP
-  AMPICILLIN: SIGNIFICANT CHANGE UP
-  CEFAZOLIN: SIGNIFICANT CHANGE UP
-  CEFEPIME: SIGNIFICANT CHANGE UP
-  CEFTRIAXONE: SIGNIFICANT CHANGE UP
-  CIPROFLOXACIN: SIGNIFICANT CHANGE UP
-  ERTAPENEM: SIGNIFICANT CHANGE UP
-  GENTAMICIN: SIGNIFICANT CHANGE UP
-  PIPERACILLIN/TAZOBACTAM: SIGNIFICANT CHANGE UP
-  TOBRAMYCIN: SIGNIFICANT CHANGE UP
-  TRIMETHOPRIM/SULFAMETHOXAZOLE: SIGNIFICANT CHANGE UP
ALBUMIN SERPL ELPH-MCNC: 2.7 G/DL — LOW (ref 3.3–5)
ALBUMIN SERPL ELPH-MCNC: 2.7 G/DL — LOW (ref 3.3–5)
ALBUMIN SERPL ELPH-MCNC: 2.8 G/DL — LOW (ref 3.3–5)
ALP SERPL-CCNC: 64 U/L — SIGNIFICANT CHANGE UP (ref 40–120)
ALP SERPL-CCNC: 64 U/L — SIGNIFICANT CHANGE UP (ref 40–120)
ALP SERPL-CCNC: 73 U/L — SIGNIFICANT CHANGE UP (ref 40–120)
ALT FLD-CCNC: 5 U/L — LOW (ref 10–45)
ALT FLD-CCNC: 6 U/L — LOW (ref 10–45)
ALT FLD-CCNC: 7 U/L — LOW (ref 10–45)
ALT FLD-CCNC: <5 U/L — LOW (ref 10–45)
ANION GAP SERPL CALC-SCNC: 10 MMOL/L — SIGNIFICANT CHANGE UP (ref 5–17)
ANION GAP SERPL CALC-SCNC: 11 MMOL/L — SIGNIFICANT CHANGE UP (ref 5–17)
ANION GAP SERPL CALC-SCNC: 11 MMOL/L — SIGNIFICANT CHANGE UP (ref 5–17)
APTT BLD: 29.9 SEC — SIGNIFICANT CHANGE UP (ref 27.5–35.5)
APTT BLD: 30.7 SEC — SIGNIFICANT CHANGE UP (ref 27.5–35.5)
APTT BLD: 31.6 SEC — SIGNIFICANT CHANGE UP (ref 27.5–35.5)
APTT BLD: 46.4 SEC — HIGH (ref 27.5–35.5)
AST SERPL-CCNC: 16 U/L — SIGNIFICANT CHANGE UP (ref 10–40)
AST SERPL-CCNC: 17 U/L — SIGNIFICANT CHANGE UP (ref 10–40)
AST SERPL-CCNC: 18 U/L — SIGNIFICANT CHANGE UP (ref 10–40)
AST SERPL-CCNC: 21 U/L — SIGNIFICANT CHANGE UP (ref 10–40)
BILIRUB SERPL-MCNC: 2.3 MG/DL — HIGH (ref 0.2–1.2)
BILIRUB SERPL-MCNC: 2.4 MG/DL — HIGH (ref 0.2–1.2)
BILIRUB SERPL-MCNC: 3.5 MG/DL — HIGH (ref 0.2–1.2)
BLD GP AB SCN SERPL QL: NEGATIVE — SIGNIFICANT CHANGE UP
BUN SERPL-MCNC: 54 MG/DL — HIGH (ref 7–23)
BUN SERPL-MCNC: 59 MG/DL — HIGH (ref 7–23)
BUN SERPL-MCNC: 61 MG/DL — HIGH (ref 7–23)
CALCIUM SERPL-MCNC: 8.6 MG/DL — SIGNIFICANT CHANGE UP (ref 8.4–10.5)
CALCIUM SERPL-MCNC: 8.7 MG/DL — SIGNIFICANT CHANGE UP (ref 8.4–10.5)
CALCIUM SERPL-MCNC: 8.8 MG/DL — SIGNIFICANT CHANGE UP (ref 8.4–10.5)
CHLORIDE SERPL-SCNC: 106 MMOL/L — SIGNIFICANT CHANGE UP (ref 96–108)
CHLORIDE SERPL-SCNC: 107 MMOL/L — SIGNIFICANT CHANGE UP (ref 96–108)
CHLORIDE SERPL-SCNC: 107 MMOL/L — SIGNIFICANT CHANGE UP (ref 96–108)
CO2 SERPL-SCNC: 23 MMOL/L — SIGNIFICANT CHANGE UP (ref 22–31)
CO2 SERPL-SCNC: 24 MMOL/L — SIGNIFICANT CHANGE UP (ref 22–31)
CO2 SERPL-SCNC: 25 MMOL/L — SIGNIFICANT CHANGE UP (ref 22–31)
CREAT SERPL-MCNC: 1.24 MG/DL — SIGNIFICANT CHANGE UP (ref 0.5–1.3)
CREAT SERPL-MCNC: 1.27 MG/DL — SIGNIFICANT CHANGE UP (ref 0.5–1.3)
CREAT SERPL-MCNC: 1.28 MG/DL — SIGNIFICANT CHANGE UP (ref 0.5–1.3)
CULTURE RESULTS: NO GROWTH — SIGNIFICANT CHANGE UP
CULTURE RESULTS: SIGNIFICANT CHANGE UP
EGFR: 67 ML/MIN/1.73M2 — SIGNIFICANT CHANGE UP
EGFR: 67 ML/MIN/1.73M2 — SIGNIFICANT CHANGE UP
EGFR: 69 ML/MIN/1.73M2 — SIGNIFICANT CHANGE UP
GAS PNL BLDA: SIGNIFICANT CHANGE UP
GLUCOSE SERPL-MCNC: 127 MG/DL — HIGH (ref 70–99)
GLUCOSE SERPL-MCNC: 133 MG/DL — HIGH (ref 70–99)
GLUCOSE SERPL-MCNC: 175 MG/DL — HIGH (ref 70–99)
GRAM STN FLD: SIGNIFICANT CHANGE UP
GRAM STN FLD: SIGNIFICANT CHANGE UP
HCT VFR BLD CALC: 26 % — LOW (ref 39–50)
HCT VFR BLD CALC: 26.3 % — LOW (ref 39–50)
HCT VFR BLD CALC: 26.8 % — LOW (ref 39–50)
HCT VFR BLD CALC: 31.1 % — LOW (ref 39–50)
HGB BLD-MCNC: 10.4 G/DL — LOW (ref 13–17)
HGB BLD-MCNC: 8.5 G/DL — LOW (ref 13–17)
HGB BLD-MCNC: 8.6 G/DL — LOW (ref 13–17)
HGB BLD-MCNC: 8.9 G/DL — LOW (ref 13–17)
INR BLD: 1.27 — HIGH (ref 0.88–1.16)
INR BLD: 1.31 — HIGH (ref 0.88–1.16)
INR BLD: 1.36 — HIGH (ref 0.88–1.16)
LACTATE SERPL-SCNC: 1.1 MMOL/L — SIGNIFICANT CHANGE UP (ref 0.5–2)
LACTATE SERPL-SCNC: 1.2 MMOL/L — SIGNIFICANT CHANGE UP (ref 0.5–2)
LACTATE SERPL-SCNC: 1.3 MMOL/L — SIGNIFICANT CHANGE UP (ref 0.5–2)
MAGNESIUM SERPL-MCNC: 1.8 MG/DL — SIGNIFICANT CHANGE UP (ref 1.6–2.6)
MAGNESIUM SERPL-MCNC: 2.1 MG/DL — SIGNIFICANT CHANGE UP (ref 1.6–2.6)
MAGNESIUM SERPL-MCNC: 2.2 MG/DL — SIGNIFICANT CHANGE UP (ref 1.6–2.6)
MCHC RBC-ENTMCNC: 29.9 PG — SIGNIFICANT CHANGE UP (ref 27–34)
MCHC RBC-ENTMCNC: 30.1 PG — SIGNIFICANT CHANGE UP (ref 27–34)
MCHC RBC-ENTMCNC: 30.2 PG — SIGNIFICANT CHANGE UP (ref 27–34)
MCHC RBC-ENTMCNC: 30.5 PG — SIGNIFICANT CHANGE UP (ref 27–34)
MCHC RBC-ENTMCNC: 32.7 GM/DL — SIGNIFICANT CHANGE UP (ref 32–36)
MCHC RBC-ENTMCNC: 32.7 GM/DL — SIGNIFICANT CHANGE UP (ref 32–36)
MCHC RBC-ENTMCNC: 33.2 GM/DL — SIGNIFICANT CHANGE UP (ref 32–36)
MCHC RBC-ENTMCNC: 33.4 GM/DL — SIGNIFICANT CHANGE UP (ref 32–36)
MCV RBC AUTO: 90.4 FL — SIGNIFICANT CHANGE UP (ref 80–100)
MCV RBC AUTO: 91.5 FL — SIGNIFICANT CHANGE UP (ref 80–100)
MCV RBC AUTO: 91.8 FL — SIGNIFICANT CHANGE UP (ref 80–100)
MCV RBC AUTO: 92 FL — SIGNIFICANT CHANGE UP (ref 80–100)
METHOD TYPE: SIGNIFICANT CHANGE UP
NRBC # BLD: 0 /100 WBCS — SIGNIFICANT CHANGE UP (ref 0–0)
ORGANISM # SPEC MICROSCOPIC CNT: SIGNIFICANT CHANGE UP
PHOSPHATE SERPL-MCNC: 2.9 MG/DL — SIGNIFICANT CHANGE UP (ref 2.5–4.5)
PHOSPHATE SERPL-MCNC: 3.4 MG/DL — SIGNIFICANT CHANGE UP (ref 2.5–4.5)
PHOSPHATE SERPL-MCNC: 3.5 MG/DL — SIGNIFICANT CHANGE UP (ref 2.5–4.5)
PLATELET # BLD AUTO: 157 K/UL — SIGNIFICANT CHANGE UP (ref 150–400)
PLATELET # BLD AUTO: 160 K/UL — SIGNIFICANT CHANGE UP (ref 150–400)
PLATELET # BLD AUTO: 161 K/UL — SIGNIFICANT CHANGE UP (ref 150–400)
PLATELET # BLD AUTO: 188 K/UL — SIGNIFICANT CHANGE UP (ref 150–400)
POTASSIUM SERPL-MCNC: 3.2 MMOL/L — LOW (ref 3.5–5.3)
POTASSIUM SERPL-MCNC: 3.9 MMOL/L — SIGNIFICANT CHANGE UP (ref 3.5–5.3)
POTASSIUM SERPL-MCNC: 3.9 MMOL/L — SIGNIFICANT CHANGE UP (ref 3.5–5.3)
POTASSIUM SERPL-SCNC: 3.2 MMOL/L — LOW (ref 3.5–5.3)
POTASSIUM SERPL-SCNC: 3.9 MMOL/L — SIGNIFICANT CHANGE UP (ref 3.5–5.3)
POTASSIUM SERPL-SCNC: 3.9 MMOL/L — SIGNIFICANT CHANGE UP (ref 3.5–5.3)
PROT SERPL-MCNC: 5.4 G/DL — LOW (ref 6–8.3)
PROT SERPL-MCNC: 5.7 G/DL — LOW (ref 6–8.3)
PROT SERPL-MCNC: 5.8 G/DL — LOW (ref 6–8.3)
PROTHROM AB SERPL-ACNC: 15.1 SEC — HIGH (ref 10.5–13.4)
PROTHROM AB SERPL-ACNC: 15.6 SEC — HIGH (ref 10.5–13.4)
PROTHROM AB SERPL-ACNC: 16.2 SEC — HIGH (ref 10.5–13.4)
RBC # BLD: 2.84 M/UL — LOW (ref 4.2–5.8)
RBC # BLD: 2.86 M/UL — LOW (ref 4.2–5.8)
RBC # BLD: 2.92 M/UL — LOW (ref 4.2–5.8)
RBC # BLD: 3.44 M/UL — LOW (ref 4.2–5.8)
RBC # FLD: 16.1 % — HIGH (ref 10.3–14.5)
RBC # FLD: 16.2 % — HIGH (ref 10.3–14.5)
RBC # FLD: 16.2 % — HIGH (ref 10.3–14.5)
RBC # FLD: 16.5 % — HIGH (ref 10.3–14.5)
RH IG SCN BLD-IMP: POSITIVE — SIGNIFICANT CHANGE UP
SODIUM SERPL-SCNC: 140 MMOL/L — SIGNIFICANT CHANGE UP (ref 135–145)
SODIUM SERPL-SCNC: 142 MMOL/L — SIGNIFICANT CHANGE UP (ref 135–145)
SODIUM SERPL-SCNC: 142 MMOL/L — SIGNIFICANT CHANGE UP (ref 135–145)
SPECIMEN SOURCE: SIGNIFICANT CHANGE UP
VANCOMYCIN TROUGH SERPL-MCNC: 20.2 UG/ML — HIGH (ref 10–20)
WBC # BLD: 10.9 K/UL — HIGH (ref 3.8–10.5)
WBC # BLD: 11.31 K/UL — HIGH (ref 3.8–10.5)
WBC # BLD: 11.82 K/UL — HIGH (ref 3.8–10.5)
WBC # BLD: 13.18 K/UL — HIGH (ref 3.8–10.5)
WBC # FLD AUTO: 10.9 K/UL — HIGH (ref 3.8–10.5)
WBC # FLD AUTO: 11.31 K/UL — HIGH (ref 3.8–10.5)
WBC # FLD AUTO: 11.82 K/UL — HIGH (ref 3.8–10.5)
WBC # FLD AUTO: 13.18 K/UL — HIGH (ref 3.8–10.5)

## 2023-05-19 PROCEDURE — 99292 CRITICAL CARE ADDL 30 MIN: CPT

## 2023-05-19 PROCEDURE — 99231 SBSQ HOSP IP/OBS SF/LOW 25: CPT

## 2023-05-19 PROCEDURE — 99291 CRITICAL CARE FIRST HOUR: CPT

## 2023-05-19 RX ORDER — POTASSIUM CHLORIDE 20 MEQ
20 PACKET (EA) ORAL ONCE
Refills: 0 | Status: COMPLETED | OUTPATIENT
Start: 2023-05-19 | End: 2023-05-19

## 2023-05-19 RX ORDER — FUROSEMIDE 40 MG
40 TABLET ORAL ONCE
Refills: 0 | Status: COMPLETED | OUTPATIENT
Start: 2023-05-19 | End: 2023-05-19

## 2023-05-19 RX ORDER — PROPOFOL 10 MG/ML
10 INJECTION, EMULSION INTRAVENOUS
Qty: 1000 | Refills: 0 | Status: DISCONTINUED | OUTPATIENT
Start: 2023-05-19 | End: 2023-05-23

## 2023-05-19 RX ORDER — ELECTROLYTE SOLUTION,INJ
1 VIAL (ML) INTRAVENOUS
Refills: 0 | Status: DISCONTINUED | OUTPATIENT
Start: 2023-05-19 | End: 2023-05-19

## 2023-05-19 RX ORDER — FENTANYL CITRATE 50 UG/ML
25 INJECTION INTRAVENOUS ONCE
Refills: 0 | Status: DISCONTINUED | OUTPATIENT
Start: 2023-05-19 | End: 2023-05-19

## 2023-05-19 RX ORDER — MILRINONE LACTATE 1 MG/ML
0.12 INJECTION, SOLUTION INTRAVENOUS
Qty: 20 | Refills: 0 | Status: DISCONTINUED | OUTPATIENT
Start: 2023-05-19 | End: 2023-05-19

## 2023-05-19 RX ORDER — MAGNESIUM SULFATE 500 MG/ML
2 VIAL (ML) INJECTION ONCE
Refills: 0 | Status: COMPLETED | OUTPATIENT
Start: 2023-05-19 | End: 2023-05-19

## 2023-05-19 RX ORDER — ACETAMINOPHEN 500 MG
1000 TABLET ORAL ONCE
Refills: 0 | Status: COMPLETED | OUTPATIENT
Start: 2023-05-19 | End: 2023-05-19

## 2023-05-19 RX ORDER — PROPOFOL 10 MG/ML
10 INJECTION, EMULSION INTRAVENOUS
Qty: 1000 | Refills: 0 | Status: DISCONTINUED | OUTPATIENT
Start: 2023-05-19 | End: 2023-05-19

## 2023-05-19 RX ORDER — POTASSIUM CHLORIDE 20 MEQ
20 PACKET (EA) ORAL
Refills: 0 | Status: COMPLETED | OUTPATIENT
Start: 2023-05-19 | End: 2023-05-19

## 2023-05-19 RX ORDER — MILRINONE LACTATE 1 MG/ML
0.12 INJECTION, SOLUTION INTRAVENOUS
Qty: 20 | Refills: 0 | Status: DISCONTINUED | OUTPATIENT
Start: 2023-05-19 | End: 2023-05-20

## 2023-05-19 RX ORDER — VASOPRESSIN 20 [USP'U]/ML
0.02 INJECTION INTRAVENOUS
Qty: 40 | Refills: 0 | Status: DISCONTINUED | OUTPATIENT
Start: 2023-05-19 | End: 2023-05-19

## 2023-05-19 RX ORDER — PHENYLEPHRINE HYDROCHLORIDE 10 MG/ML
0.1 INJECTION INTRAVENOUS
Qty: 40 | Refills: 0 | Status: DISCONTINUED | OUTPATIENT
Start: 2023-05-19 | End: 2023-05-19

## 2023-05-19 RX ADMIN — FENTANYL CITRATE 25 MICROGRAM(S): 50 INJECTION INTRAVENOUS at 10:08

## 2023-05-19 RX ADMIN — MILRINONE LACTATE 2.63 MICROGRAM(S)/KG/MIN: 1 INJECTION, SOLUTION INTRAVENOUS at 19:20

## 2023-05-19 RX ADMIN — FENTANYL CITRATE 25 MICROGRAM(S): 50 INJECTION INTRAVENOUS at 16:31

## 2023-05-19 RX ADMIN — CHLORHEXIDINE GLUCONATE 15 MILLILITER(S): 213 SOLUTION TOPICAL at 17:41

## 2023-05-19 RX ADMIN — LACOSAMIDE 150 MILLIGRAM(S): 50 TABLET ORAL at 22:33

## 2023-05-19 RX ADMIN — LACOSAMIDE 150 MILLIGRAM(S): 50 TABLET ORAL at 12:15

## 2023-05-19 RX ADMIN — CHLORHEXIDINE GLUCONATE 1 APPLICATION(S): 213 SOLUTION TOPICAL at 05:59

## 2023-05-19 RX ADMIN — MEROPENEM 280 MILLIGRAM(S): 1 INJECTION INTRAVENOUS at 01:59

## 2023-05-19 RX ADMIN — VASOPRESSIN 3 UNIT(S)/MIN: 20 INJECTION INTRAVENOUS at 01:28

## 2023-05-19 RX ADMIN — FENTANYL CITRATE 25 MICROGRAM(S): 50 INJECTION INTRAVENOUS at 13:24

## 2023-05-19 RX ADMIN — Medication 100 MILLIEQUIVALENT(S): at 10:43

## 2023-05-19 RX ADMIN — Medication 1000 MILLIGRAM(S): at 14:55

## 2023-05-19 RX ADMIN — FENTANYL CITRATE 25 MICROGRAM(S): 50 INJECTION INTRAVENOUS at 13:09

## 2023-05-19 RX ADMIN — CHLORHEXIDINE GLUCONATE 15 MILLILITER(S): 213 SOLUTION TOPICAL at 05:43

## 2023-05-19 RX ADMIN — PIPERACILLIN AND TAZOBACTAM 25 GRAM(S): 4; .5 INJECTION, POWDER, LYOPHILIZED, FOR SOLUTION INTRAVENOUS at 13:09

## 2023-05-19 RX ADMIN — FENTANYL CITRATE 25 MICROGRAM(S): 50 INJECTION INTRAVENOUS at 10:45

## 2023-05-19 RX ADMIN — Medication 40 MILLIGRAM(S): at 18:14

## 2023-05-19 RX ADMIN — Medication 125 MILLILITER(S): at 00:00

## 2023-05-19 RX ADMIN — Medication 100 MILLIEQUIVALENT(S): at 05:55

## 2023-05-19 RX ADMIN — Medication 400 MILLIGRAM(S): at 22:00

## 2023-05-19 RX ADMIN — Medication 1000 MILLIGRAM(S): at 22:45

## 2023-05-19 RX ADMIN — FENTANYL CITRATE 3.5 MICROGRAM(S)/KG/HR: 50 INJECTION INTRAVENOUS at 01:00

## 2023-05-19 RX ADMIN — Medication 25 GRAM(S): at 04:45

## 2023-05-19 RX ADMIN — Medication 1 EACH: at 17:41

## 2023-05-19 RX ADMIN — Medication 100 MILLIEQUIVALENT(S): at 06:54

## 2023-05-19 RX ADMIN — MILRINONE LACTATE 2.63 MICROGRAM(S)/KG/MIN: 1 INJECTION, SOLUTION INTRAVENOUS at 15:17

## 2023-05-19 RX ADMIN — MIDAZOLAM HYDROCHLORIDE 1.4 MG/KG/HR: 1 INJECTION, SOLUTION INTRAMUSCULAR; INTRAVENOUS at 00:17

## 2023-05-19 RX ADMIN — PIPERACILLIN AND TAZOBACTAM 25 GRAM(S): 4; .5 INJECTION, POWDER, LYOPHILIZED, FOR SOLUTION INTRAVENOUS at 21:47

## 2023-05-19 RX ADMIN — FENTANYL CITRATE 25 MICROGRAM(S): 50 INJECTION INTRAVENOUS at 11:00

## 2023-05-19 RX ADMIN — PROPOFOL 4.2 MICROGRAM(S)/KG/MIN: 10 INJECTION, EMULSION INTRAVENOUS at 10:55

## 2023-05-19 RX ADMIN — Medication 125 MILLILITER(S): at 01:50

## 2023-05-19 RX ADMIN — FENTANYL CITRATE 25 MICROGRAM(S): 50 INJECTION INTRAVENOUS at 10:23

## 2023-05-19 RX ADMIN — FENTANYL CITRATE 25 MICROGRAM(S): 50 INJECTION INTRAVENOUS at 19:38

## 2023-05-19 RX ADMIN — Medication 400 MILLIGRAM(S): at 14:40

## 2023-05-19 RX ADMIN — PANTOPRAZOLE SODIUM 40 MILLIGRAM(S): 20 TABLET, DELAYED RELEASE ORAL at 11:26

## 2023-05-19 RX ADMIN — PIPERACILLIN AND TAZOBACTAM 25 GRAM(S): 4; .5 INJECTION, POWDER, LYOPHILIZED, FOR SOLUTION INTRAVENOUS at 06:53

## 2023-05-19 RX ADMIN — PROPOFOL 4.2 MICROGRAM(S)/KG/MIN: 10 INJECTION, EMULSION INTRAVENOUS at 22:33

## 2023-05-19 RX ADMIN — Medication 250 MILLIGRAM(S): at 05:43

## 2023-05-19 RX ADMIN — DEXMEDETOMIDINE HYDROCHLORIDE IN 0.9% SODIUM CHLORIDE 3.5 MICROGRAM(S)/KG/HR: 4 INJECTION INTRAVENOUS at 00:16

## 2023-05-19 RX ADMIN — FENTANYL CITRATE 25 MICROGRAM(S): 50 INJECTION INTRAVENOUS at 16:16

## 2023-05-19 RX ADMIN — FENTANYL CITRATE 25 MICROGRAM(S): 50 INJECTION INTRAVENOUS at 19:23

## 2023-05-19 RX ADMIN — PROPOFOL 4.2 MICROGRAM(S)/KG/MIN: 10 INJECTION, EMULSION INTRAVENOUS at 19:22

## 2023-05-19 RX ADMIN — MILRINONE LACTATE 7.88 MICROGRAM(S)/KG/MIN: 1 INJECTION, SOLUTION INTRAVENOUS at 00:01

## 2023-05-19 RX ADMIN — Medication 100 MILLIEQUIVALENT(S): at 16:45

## 2023-05-19 RX ADMIN — Medication 25 GRAM(S): at 04:39

## 2023-05-19 RX ADMIN — Medication 100 MILLIEQUIVALENT(S): at 08:37

## 2023-05-19 NOTE — PROGRESS NOTE ADULT - SUBJECTIVE AND OBJECTIVE BOX
IDENTIFICATION: This is a 54yo Male pt with PMH HTN, type A aortic dissection s/p Dacron grafts, AV resuspension in , CAD s/p CABG x 1 SVG to RCA (2013 with Dr. Medina), seizure disorder (last episode on 22) S/P replacement of transverse aortic arch, second stage thoracic endovascular aortic repair, aorto-axillary bypass, AV replacement (bio 23mm), in 2023 and CABG x 1 (SVG-RCA) EF 75% (Ignacio, 3/20) now s/p 2nd state TEVAR on  for whom surgery was consulted for finding of acute pancreatitis with large peripancreatic/perigastric fluid collections on CTA abdomen  then acute hemorrhage starting 23 requiring 11 U pRBC over last 48 hours but with negative mesenteric angiogram 23 for whom hepatobiliary surgery was reconsulted for possible hemorrhagic pancreatitis.    EVENTS:   - Remains intubated/sedated  - NO transfusion yesterday; H&H stable 8.5 from 8.6 rechecked this am.  - ABx Vanco and PipTazo (from CPM) . SputCx & BCx with Klebsiella.  - CT showed pancreatic necrosis with loculations, slightly improved LLQ hematoma without gas to suggest superinfeciton; bilateral pulmonary consolidations.    SUBJECTIVE:   - Sedated    MEDICATIONS  (STANDING):  aspirin  chewable 81 milliGRAM(s) Oral daily  chlorhexidine 0.12% Liquid 15 milliLiter(s) Oral Mucosa every 12 hours  chlorhexidine 2% Cloths 1 Application(s) Topical daily  fentaNYL    Injectable 50 MICROGram(s) IV Push once  lacosamide IVPB 250 milliGRAM(s) IV Intermittent every 12 hours  milrinone Infusion 0.375 MICROgram(s)/kG/Min (7.88 mL/Hr) IV Continuous <Continuous>  pantoprazole  Injectable 40 milliGRAM(s) IV Push daily  Parenteral Nutrition - Adult 1 Each (70 mL/Hr) TPN Continuous <Continuous>  phenylephrine    Infusion 0.1 MICROgram(s)/kG/Min (2.63 mL/Hr) IV Continuous <Continuous>  piperacillin/tazobactam IVPB.. 3.375 Gram(s) IV Intermittent every 8 hours  potassium chloride  20 mEq/100 mL IVPB 20 milliEquivalent(s) IV Intermittent every 1 hour  propofol Infusion 10 MICROgram(s)/kG/Min (4.2 mL/Hr) IV Continuous <Continuous>  sodium chloride 0.9%. 1000 milliLiter(s) (10 mL/Hr) IV Continuous <Continuous>  vancomycin  IVPB 1000 milliGRAM(s) IV Intermittent every 12 hours  vasopressin Infusion 0.02 Unit(s)/Min (3 mL/Hr) IV Continuous <Continuous>    MEDICATIONS  (PRN):  fentaNYL    Injectable 25 MICROGram(s) IV Push every 3 hours PRN Severe Pain (7 - 10)      Vital Signs Last 24 Hrs  T(C): 36.3 (19 May 2023 05:19), Max: 36.6 (18 May 2023 08:41)  T(F): 97.3 (19 May 2023 05:19), Max: 97.9 (18 May 2023 08:41)  HR: 85 (19 May 2023 07:00) (85 - 163)  BP: --  BP(mean): --  RR: 20 (19 May 2023 07:00) (0 - 41)  SpO2: 100% (19 May 2023 07:00) (94% - 100%)    Parameters below as of 19 May 2023 07:00  Patient On (Oxygen Delivery Method): ventilator    O2 Concentration (%): 60    Neurologic: Sedated  CV: Normal rate, regular rhythm  Pulm: Breathing comfortably on MV with equal chest rise.  Abd: Mildly distended; mild reaction to palpation despite sedation  : No Cantrell  Skin: No rashes  Extremities: No edema.  Psychiatric: Sedated      I&O's Detail    18 May 2023 07:01  -  19 May 2023 07:00  --------------------------------------------------------  IN:    Albumin 5%  - 250 mL: 500 mL    Cisatracurium: 142.8 mL    Cryoprecipitate: 286 mL    Dexmedetomidine: 78.8 mL    Enteral Tube Flush: 200 mL    FentaNYL: 24.5 mL    IV PiggyBack: 700 mL    Midazolam: 91 mL    Milrinone: 50.4 mL    Phenylephrine: 79.3 mL    Phenylephrine: 10.6 mL    Plasma: 350 mL    Propofol: 31.5 mL    Propofol: 63 mL    sodium chloride 0.9%: 240 mL    TPN (Total Parenteral Nutrition): 1200 mL    Vasopressin: 18 mL  Total IN: 4065.9 mL    OUT:    Indwelling Catheter - Urethral (mL): 3920 mL    Nasogastric/Oral tube (mL): 1000 mL  Total OUT: 4920 mL    Total NET: -854.1 mL          LABS:                        8.5    11.31 )-----------( 157      ( 19 May 2023 04:15 )             26.0     05-    140  |  107  |  54<H>  ----------------------------<  175<H>  3.2<L>   |  23  |  1.27    Ca    8.6      19 May 2023 03:33  Phos  3.4     -  Mg     1.8         TPro  5.7<L>  /  Alb  2.7<L>  /  TBili  2.3<H>  /  DBili  x   /  AST  16  /  ALT  7<L>  /  AlkPhos  64  05-19    PT/INR - ( 19 May 2023 03:33 )   PT: 16.2 sec;   INR: 1.36          PTT - ( 19 May 2023 03:33 )  PTT:31.6 sec  Urinalysis Basic - ( 17 May 2023 20:04 )    Color: Yellow / Appearance: Clear / S.020 / pH: x  Gluc: x / Ketone: NEGATIVE  / Bili: Negative / Urobili: 0.2 E.U./dL   Blood: x / Protein: 100 mg/dL / Nitrite: NEGATIVE   Leuk Esterase: NEGATIVE / RBC: 5-10 /HPF / WBC < 5 /HPF   Sq Epi: x / Non Sq Epi: x / Bacteria: Present /HPF        RADIOLOGY & ADDITIONAL STUDIES:

## 2023-05-19 NOTE — PROGRESS NOTE ADULT - SUBJECTIVE AND OBJECTIVE BOX
INTERVAL HPI/OVERNIGHT EVENTS:    Cefepime 5/17 - now Zosyn 5/18    3/20: AVR/arch replacement/CABG x 1/2nd stage TEVAR, aorto axillary bypass  EF 75%    52yo Male current every day marijuana use with a past medical hx of HTN, type A aortic dissection s/p Dacron grafts, AV resuspension in 2013,CAD s/p CABG x 1 SVG to RCA (5/2013 with Dr. Medina), Seizure disorder (last 7/4/22) presents for a follow up visit for evaluation and management of his aortic pathology. Patient is referred by Dr. Jacqueline Gonsales. Screen failed for Triomphe study     CTa C/A/P 11/30/22 - Status post graft repair of the ascending aorta and portion of aortic arch.  Dissecting aneurysm of the descending thoracic aorta (measuring up to 5.7 cm) and abdominal aorta (3.5 cm infrarenal), similar to the MR angiogram of 9/19/2022. Nonocclusive dissection flap present within the innominate artery, aneurysmal right subclavian artery and proximal right common carotid artery as well as aneurysmal left common iliac artery.    Cath (3/9/23): SVG-RCA patent, remaining vessels luminal irregularities. ACCESS: L radial w/ TR band for hemostasis.     to OR 3/20: intraop - 5L Crystalloid/7 units PRBC, 6unit FFP, 3unit platelet, 15unitc cryo, 1000cc FEIBA  arrived to ICU on Epi/Levo  postop - severe acidosis and Renal failure - lasix infusion started; bicarb given   EEG post-op and CT Head for poor mentation    CT Head 3/23: no acute intracranial abnormality   post-op Atrial fib - amiodarone & hypoxemia - bronch for pulm toilet  D10 started for noted hypoglycemia   CVVHD/Aquapharesis and later HD as needed - serial sessions for fluid removal to optimize before extubation     Extubated 2/29 to [facility] -    ultimately discharged -     Presented to [facility] 4/27 for syncope v seizure episode at home   Code stroke called - Imaging obtained unremarkable for cranial pathology - concern for endoleak - Esmolol infusion started and transferred to [facility] 4/27 - pancreatitis reported on imaging   of note - just seen by his neurologist and inc medication dosage recommended based on their exam 4/26  Neuro/Epilepsy consulted - EEG (-); CT spine to assess RUE weakness - negative  mental status clear - no witnessed seizures or change in mental status noted   patient discharged    patient returned 5/5 for planned TEVAR - lumbar drain placement   5/6 - Extubated - possible LE weakness - inc MAP/transfusion support and ongoing lumbar drainage with improvement    LD clamped and patient ambulated     5/8: repeat CTa C/A/P: Diffuse pancreatic enlargement and heterogenous attenuation   predominantly in body and tail, with multiple intrapancreatic and peripancreatic fluid collections with well-defined enhancing wall, new since November 30, 2022, not significantly changed since May 5, 2023 upon directed review. Largest fluid collection along stomach greater curvature   8.5 x 2 x 2 cm, communicates with inferior fluid collection measuring 5 x 3 x 3 cm.  Main pancreatic duct at head/uncinate appears mildly dilated up to 5 mm and is poorly visualized at pancreatic neck body and tail. Right groin subcutaneous tissues most compatible with old hematoma or seroma 4 x 3 cm    5/9: right pigtail placement (700 cc) - apical PTX  (+)delirium reported    5/11 - extreme abd pain reported   urinary retention - coulter replaced - 600cc out  CT Head - no acute intracranial abnormality  CT C/A/P (noncontrast): dec Rt pleural effusion s/p chest tube placement, pigtail coiled in major fissure. Unchanged mod left pleural effusion with unchanged adjacent atelectasis/consolidation.  Limited pancreas evaluation. Unchanged marked fullness pancreatic body/tail with heterogenous attenuation. Stable or slightly decreased extrapancreatic/perigastric fluid collections on this suboptimal noncontrast study. Abbreviated differential includes pseudocyst, walled off necrosis abscess. Recommend follow-up contrast-enhanced study to resolution. Unchanged small moderate abdominopelvic ascites. Unchanged caliber aneurysmal thoracic and abdominal aorta status post endovascular repair.    5/12: drop in Hg 6.6 (+)hemorrhagic shock     repeat CT 5/12:  Interval worsening of the thickening of the pancreatic body and tail with hyperdense components suggesting hemorrhage. Additional cystic changes in the pancreatic head are not well evaluated on this study. Underlying pathology such as inflammation or neoplasm cannot be excluded based on these non contrast images.    intubated and taken to IR for embolization - 4 U pRBC given ; general surgery following - no arterial bleeding noted - no intervention    intercurrent seizure - epilepsy team contacted and meds adjusted ; EEG placed and ongoing     ongoing transfusion support requirement  required additional transfusion support 5/13-5/15 - 11 U pRBC/3 FFP/2 Cryo/2 plt   Tito titrated off 5/15    attempted sedation hold to assess neuro status -   serial counts have been stable and no transfusion given for 24 hours - cont to monitor  TPN restarted 5/15    in light of pancreatic risk and bleeding risk - titrating valproate down to off with guidance from epilepsy medication off as of 5/18 - no seizure on EEG (ongoing)  ongoing TPN     concern for clinical picture of SIRS/early sepsis 5/17 - prompting imaging and initiation of presumptive Abx -     Bronch/sputum 5/17: Klebsiella pneumoniae/Proteus mirabilis  Blood Cultures 5/17: Klebsiella pneumoniae  UCx (-)  repeat Culture 5/18 (-)to date   ID consultation called - Abx transitioned to Zosyn - susceptibilites to all isolates pending at this time     line changed 5/18 and port preserved for ongoing TPN; ETT upsized   placed on Nimbex/fentanyl/versed infusions; Armaan added   valproate titrated off per Epilepsy consultant recommendations 5/18 and vimpat increased     remains on primacor/Tito/propofol  lasix dosing given with favorable response     ICU Vital Signs Last 24 Hrs  T(C): 36.3 (19 May 2023 05:19), Max: 36.6 (18 May 2023 08:41)  T(F): 97.3 (19 May 2023 05:19), Max: 97.9 (18 May 2023 08:41)  HR: 85 (19 May 2023 07:00) (85 - 163) sinus   ABP: 110/61 (19 May 2023 07:00) (78/50 - 168/105)  ABP(mean): 82 (19 May 2023 07:00) (57 - 133)  RR: 20 (19 May 2023 07:00) (0 - 41)  SpO2: 100% (19 May 2023 07:00) (94% - 100%) Fi02 60%/PEEP 8; Armaan 10 PPM     Qtts:   Nimbex - off  Fentanyl - off  Versed - off  propofol  Primacor 0.125  Tito 0.5    I&O's Summary    18 May 2023 07:01  -  19 May 2023 07:00  --------------------------------------------------------  IN: 4065.9 mL / OUT: 4920 mL / NET: -854.1 mL    Mode: AC/ CMV (Assist Control/ Continuous Mandatory Ventilation)  RR (machine): 20  TV (machine): 500  FiO2: 60  PEEP: 8  ITime: 1  MAP: 12  PIP: 20    Physical Exam    Heart - regular (-)rub/gallop  Lungs - BS appreciated bilaterally - dec BS bases Rt worse than left - no wheeze   Abd - (+)BS soft NTND (-)r/r/g  Ext - diffuse edema - no cyanosis/clubbing  Neuro - sedated and paralyzed at this time - TOF 0/4; unable   Skin - no rash     LABS:                        8.5    11.31 )-----------( 157      ( 19 May 2023 04:15 )             26.0     05-19    140  |  107  |  54<H>  ----------------------------<  175<H>  3.2<L>   |  23  |  1.27    Ca    8.6      19 May 2023 03:33  Phos  3.4     05-19  Mg     1.8     05-19    TPro  5.7<L>  /  Alb  2.7<L>  /  TBili  2.3<H>  /  DBili  x   /  AST  16  /  ALT  7<L>  /  AlkPhos  64  05-19    PT/INR - ( 19 May 2023 03:33 )   PT: 16.2 sec;   INR: 1.36     PTT - ( 19 May 2023 03:33 )  PTT:31.6 sec    ABG - ( 19 May 2023 03:25 )  pH, Arterial: 7.40  pH, Blood: x     /  pCO2: 39    /  pO2: 235   / HCO3: 24    / Base Excess: -0.5  /  SaO2: 99.2      RADIOLOGY & ADDITIONAL STUDIES:  Sputum 3/27 : serratia marcescens  Bronch/sputum 5/17: Klebsiella pneumoniae/Proteus mirabilis  Blood Cultures 5/17: Klebsiella pneumoniae  UCx (-)  Blood Culture 5/18 (-) to date       Patient with complicated medical and surgical Hx with known pancreatitis and seizure disorder with uncontrolled seizures presenting for planned TEVAR 5/5 with some post-op LE weakness - improved with transfusion/inc MAP and lumbar drain - since removed with intercurrent progression of pancreatitis with bleeding/hemorrhagic shock - being followed by surgery; attempted IR - no arterial bleeding/no intervention - prior significant transfusion requirement for several days/ seizure meds titrated down - intercurrent concern for SIRS/early sepsis prompting cultures and presumptive Abx found to have Klebsiella pneumonia bacteremia - suspected pulm source based on bronch 5/17      1. CV  prior Hemorrhagic shock - resolved   Sepsis - cultures (+)for Klebsiella pneumoniae; sputum with Klebsiella and proteus  Tito as needed to maintain MAP 65 or greater -   cont primacor   sinus   monitor for ongoing transfusion requirement - serial labs  if need additional products will obtain 3 phase/contrast scan to assess  ASA    2. GI  pancreatitis - known from prior admit on CT (asymptomatic)  valproate can be associated with pancreatitis - to d/w neuro/epilepsy medications - complicated as patient with known seizure disorder and intermittent seizures on last admit and this admit  Most recent imaging 5/12   no evidence of ongoing bleeding over last 48-72 hours - monitor  surgery following  new line placed 5/15 - ongoing TPN    3. Neuro  known seizure do and witnessed seizure on this admit  vimpat now 200 IV q8h; valproate 500 q12h with plan to titrate to 250 5/17  EEG remains in place   titration of meds per Epilepsy team     4. Renal  monitor UO/Lytes and Cr  avoid nephrotoxins as much as able     SCD and GI prophylaxis     d/w family/staff/patient/ID & CTS      I have spent/provided stated minutes of critical care time to this patient: 90 INTERVAL HPI/OVERNIGHT EVENTS:    Cefepime/Vanco 5/17 - now Zosyn/Vanco 5/18    3/20: AVR/arch replacement/CABG x 1/2nd stage TEVAR, aorto axillary bypass  EF 75%    54yo Male current every day marijuana use with a past medical hx of HTN, type A aortic dissection s/p Dacron grafts, AV resuspension in 2013,CAD s/p CABG x 1 SVG to RCA (5/2013 with Dr. Medina), Seizure disorder (last 7/4/22) presents for a follow up visit for evaluation and management of his aortic pathology. Patient is referred by Dr. Jacqueline Gonsales. Screen failed for Triomphe study     CTa C/A/P 11/30/22 - Status post graft repair of the ascending aorta and portion of aortic arch.  Dissecting aneurysm of the descending thoracic aorta (measuring up to 5.7 cm) and abdominal aorta (3.5 cm infrarenal), similar to the MR angiogram of 9/19/2022. Nonocclusive dissection flap present within the innominate artery, aneurysmal right subclavian artery and proximal right common carotid artery as well as aneurysmal left common iliac artery.    Cath (3/9/23): SVG-RCA patent, remaining vessels luminal irregularities. ACCESS: L radial w/ TR band for hemostasis.     to OR 3/20: intraop - 5L Crystalloid/7 units PRBC, 6unit FFP, 3unit platelet, 15unitc cryo, 1000cc FEIBA  arrived to ICU on Epi/Levo  postop - severe acidosis and Renal failure - lasix infusion started; bicarb given   EEG post-op and CT Head for poor mentation    CT Head 3/23: no acute intracranial abnormality   post-op Atrial fib - amiodarone & hypoxemia - bronch for pulm toilet  D10 started for noted hypoglycemia   CVVHD/Aquapharesis and later HD as needed - serial sessions for fluid removal to optimize before extubation     Extubated 2/29 to [facility] -    ultimately discharged -     Presented to [facility] 4/27 for syncope v seizure episode at home   Code stroke called - Imaging obtained unremarkable for cranial pathology - concern for endoleak - Esmolol infusion started and transferred to [facility] 4/27 - pancreatitis reported on imaging   of note - just seen by his neurologist and inc medication dosage recommended based on their exam 4/26  Neuro/Epilepsy consulted - EEG (-); CT spine to assess RUE weakness - negative  mental status clear - no witnessed seizures or change in mental status noted   patient discharged    patient returned 5/5 for planned TEVAR - lumbar drain placement   5/6 - Extubated - possible LE weakness - inc MAP/transfusion support and ongoing lumbar drainage with improvement    LD clamped and patient ambulated     5/8: repeat CTa C/A/P: Diffuse pancreatic enlargement and heterogenous attenuation   predominantly in body and tail, with multiple intrapancreatic and peripancreatic fluid collections with well-defined enhancing wall, new since November 30, 2022, not significantly changed since May 5, 2023 upon directed review. Largest fluid collection along stomach greater curvature   8.5 x 2 x 2 cm, communicates with inferior fluid collection measuring 5 x 3 x 3 cm.  Main pancreatic duct at head/uncinate appears mildly dilated up to 5 mm and is poorly visualized at pancreatic neck body and tail. Right groin subcutaneous tissues most compatible with old hematoma or seroma 4 x 3 cm    5/9: right pigtail placement (700 cc) - apical PTX  (+)delirium reported    5/11 - extreme abd pain reported   urinary retention - coulter replaced - 600cc out  CT Head - no acute intracranial abnormality  CT C/A/P (noncontrast): dec Rt pleural effusion s/p chest tube placement, pigtail coiled in major fissure. Unchanged mod left pleural effusion with unchanged adjacent atelectasis/consolidation.  Limited pancreas evaluation. Unchanged marked fullness pancreatic body/tail with heterogenous attenuation. Stable or slightly decreased extrapancreatic/perigastric fluid collections on this suboptimal noncontrast study. Abbreviated differential includes pseudocyst, walled off necrosis abscess. Recommend follow-up contrast-enhanced study to resolution. Unchanged small moderate abdominopelvic ascites. Unchanged caliber aneurysmal thoracic and abdominal aorta status post endovascular repair.    5/12: drop in Hg 6.6 (+)hemorrhagic shock     repeat CT 5/12:  Interval worsening of the thickening of the pancreatic body and tail with hyperdense components suggesting hemorrhage. Additional cystic changes in the pancreatic head are not well evaluated on this study. Underlying pathology such as inflammation or neoplasm cannot be excluded based on these non contrast images.    intubated and taken to IR for embolization - 4 U pRBC given ; general surgery following - no arterial bleeding noted - no intervention    intercurrent seizure - epilepsy team contacted and meds adjusted ; EEG placed and ongoing     ongoing transfusion support requirement  required additional transfusion support 5/13-5/15 - 11 U pRBC/3 FFP/2 Cryo/2 plt   Tito titrated off 5/15    attempted sedation hold to assess neuro status -   serial counts have been stable and no transfusion given for 24 hours - cont to monitor  TPN restarted 5/15    in light of pancreatic risk and bleeding risk - titrating valproate down to off with guidance from epilepsy medication off as of 5/18 - no seizure on EEG (ongoing)  ongoing TPN     concern for clinical picture of SIRS/early sepsis 5/17 - prompting imaging and initiation of presumptive Abx -     Bronch/sputum 5/17: Klebsiella pneumoniae/Proteus mirabilis  Blood Cultures 5/17: Klebsiella pneumoniae  UCx (-)  repeat Culture 5/18 (-)to date   ID consultation called - Abx transitioned to Zosyn - susceptibilites to all isolates pending at this time     CTa C/A/P 5/18: multiloculated necrosis of the distal pancreas   contiguous with the low described retroperitoneal hematoma. No   pseudoaneurysm or active bleeding. Several small pseudocysts or necrotic   collections at the head of the largest 1.2 cm. Left retroperitoneal hematoma mildly   decreased in size now 16 x 10 cm, prior 20 x 11 cm. ET tube above the leeanne. Increased from prior diffuse bilateral groundglass and branching nodular opacities, possibly   combination of pulmonary edema and multifocal pneumonia    line changed 5/18 and port preserved for ongoing TPN; ETT upsized   placed on Nimbex/fentanyl/versed infusions; Armaan added   valproate titrated off per Epilepsy consultant recommendations 5/18 and vimpat increased     remains on primacor/Tito/propofol  lasix dosing given with favorable response     ICU Vital Signs Last 24 Hrs  T(C): 36.3 (19 May 2023 05:19), Max: 36.6 (18 May 2023 08:41)  T(F): 97.3 (19 May 2023 05:19), Max: 97.9 (18 May 2023 08:41)  HR: 85 (19 May 2023 07:00) (85 - 163) sinus   ABP: 110/61 (19 May 2023 07:00) (78/50 - 168/105)  ABP(mean): 82 (19 May 2023 07:00) (57 - 133)  RR: 20 (19 May 2023 07:00) (0 - 41)  SpO2: 100% (19 May 2023 07:00) (94% - 100%) Fi02 60%/PEEP 8; Armaan 10 PPM     Qtts:   Nimbex - off  Fentanyl - off  Versed - off  propofol  Primacor 0.125  Tito 0.5    I&O's Summary    18 May 2023 07:01  -  19 May 2023 07:00  --------------------------------------------------------  IN: 4065.9 mL / OUT: 4920 mL / NET: -854.1 mL    Mode: AC/ CMV (Assist Control/ Continuous Mandatory Ventilation)  RR (machine): 20  TV (machine): 500  FiO2: 60  PEEP: 8  ITime: 1  MAP: 12  PIP: 20    Physical Exam    Heart - regular (-)rub/gallop  Lungs - BS appreciated bilaterally - dec BS bases Rt worse than left - no wheeze   Abd - (+)BS soft NTND (-)r/r/g  Ext - diffuse edema - no cyanosis/clubbing  Neuro - sedated and paralyzed at this time - TOF 0/4; unable   Skin - no rash     LABS:                        8.5    11.31 )-----------( 157      ( 19 May 2023 04:15 )             26.0     05-19    140  |  107  |  54<H>  ----------------------------<  175<H>  3.2<L>   |  23  |  1.27    Ca    8.6      19 May 2023 03:33  Phos  3.4     05-19  Mg     1.8     05-19    TPro  5.7<L>  /  Alb  2.7<L>  /  TBili  2.3<H>  /  DBili  x   /  AST  16  /  ALT  7<L>  /  AlkPhos  64  05-19    PT/INR - ( 19 May 2023 03:33 )   PT: 16.2 sec;   INR: 1.36     PTT - ( 19 May 2023 03:33 )  PTT:31.6 sec    ABG - ( 19 May 2023 03:25 )  pH, Arterial: 7.40  pH, Blood: x     /  pCO2: 39    /  pO2: 235   / HCO3: 24    / Base Excess: -0.5  /  SaO2: 99.2      RADIOLOGY & ADDITIONAL STUDIES:  Sputum 3/27 : serratia marcescens  Bronch/sputum 5/17: Klebsiella pneumoniae/Proteus mirabilis  Blood Cultures 5/17: Klebsiella pneumoniae  UCx (-)  Blood Culture 5/18 (-) to date       Patient with complicated medical and surgical Hx with known pancreatitis and seizure disorder with uncontrolled seizures presenting for planned TEVAR 5/5 with some post-op LE weakness - improved with transfusion/inc MAP and lumbar drain - since removed with intercurrent progression of pancreatitis with bleeding/hemorrhagic shock - being followed by surgery; attempted IR - no arterial bleeding/no intervention - prior significant transfusion requirement for several days/ seizure meds titrated down - intercurrent concern for SIRS/early sepsis prompting cultures and presumptive Abx found to have Klebsiella pneumonia bacteremia - suspected GI & pulm source based on bronch 5/17      1. CV  prior Hemorrhagic shock - resolved   Sepsis - cultures (+)for Klebsiella pneumoniae; sputum with Klebsiella and proteus  Tito as needed to maintain MAP 65 or greater -   cont primacor   sinus   monitor for ongoing transfusion requirement - serial labs  if need additional products will obtain 3 phase/contrast scan to assess  ASA  diuresis     2. Neuro  valproate titrated off; EEG ongoing and to be reassessed by neuro  significant sedation and paralytic started 5/18 - to take off and assess patient   vimpat increased - to check level after 4th dose     3. GI  pancreatitis - known from prior admit on CT (asymptomatic)  valproate can be associated with pancreatitis - titrated off 5/18  no transfusion support given since 5/14  surgery following  new line placed 5/18 - ongoing TPN  CTa C/A/P 5/18: multiloculated necrosis of the distal pancreas   contiguous with the low described retroperitoneal hematoma. No   pseudoaneurysm or active bleeding. Several small pseudocysts or necrotic   collections at the head of the largest 1.2 cm. Left retroperitoneal hematoma mildly   decreased in size now 16 x 10 cm, prior 20 x 11 cm. ET tube above the leeanne. Increased from prior diffuse bilateral groundglass and branching nodular opacities, possibly combination of pulmonary edema and multifocal pneumonia    4. Renal  monitor UO/Lytes and Cr  avoid nephrotoxins as much as able     serial labs to monitor closely  SCD and GI prophylaxis     d/w staff & CTS    I have spent/provided stated minutes of nonprocedural critical care time to this patient: 90 INTERVAL HPI/OVERNIGHT EVENTS:    Cefepime/Vanco 5/17 - now Zosyn/Vanco 5/18    3/20: AVR/arch replacement/CABG x 1/2nd stage TEVAR, aorto axillary bypass  EF 75%    54yo Male current every day marijuana use with a past medical hx of HTN, type A aortic dissection s/p Dacron grafts, AV resuspension in 2013,CAD s/p CABG x 1 SVG to RCA (5/2013 with Dr. Medina), Seizure disorder (last 7/4/22) presents for a follow up visit for evaluation and management of his aortic pathology. Patient is referred by Dr. Jacqueline Gonsales. Screen failed for Triomphe study     CTa C/A/P 11/30/22 - Status post graft repair of the ascending aorta and portion of aortic arch.  Dissecting aneurysm of the descending thoracic aorta (measuring up to 5.7 cm) and abdominal aorta (3.5 cm infrarenal), similar to the MR angiogram of 9/19/2022. Nonocclusive dissection flap present within the innominate artery, aneurysmal right subclavian artery and proximal right common carotid artery as well as aneurysmal left common iliac artery.    Cath (3/9/23): SVG-RCA patent, remaining vessels luminal irregularities. ACCESS: L radial w/ TR band for hemostasis.     to OR 3/20: intraop - 5L Crystalloid/7 units PRBC, 6unit FFP, 3unit platelet, 15unitc cryo, 1000cc FEIBA  arrived to ICU on Epi/Levo  postop - severe acidosis and Renal failure - lasix infusion started; bicarb given   EEG post-op and CT Head for poor mentation    CT Head 3/23: no acute intracranial abnormality   post-op Atrial fib - amiodarone & hypoxemia - bronch for pulm toilet  D10 started for noted hypoglycemia   CVVHD/Aquapharesis and later HD as needed - serial sessions for fluid removal to optimize before extubation     Extubated 2/29 to [facility] -    ultimately discharged -     Presented to [facility] 4/27 for syncope v seizure episode at home   Code stroke called - Imaging obtained unremarkable for cranial pathology - concern for endoleak - Esmolol infusion started and transferred to [facility] 4/27 - pancreatitis reported on imaging   of note - just seen by his neurologist and inc medication dosage recommended based on their exam 4/26  Neuro/Epilepsy consulted - EEG (-); CT spine to assess RUE weakness - negative  mental status clear - no witnessed seizures or change in mental status noted   patient discharged    patient returned 5/5 for planned TEVAR - lumbar drain placement   5/6 - Extubated - possible LE weakness - inc MAP/transfusion support and ongoing lumbar drainage with improvement    LD clamped and patient ambulated     5/8: repeat CTa C/A/P: Diffuse pancreatic enlargement and heterogenous attenuation   predominantly in body and tail, with multiple intrapancreatic and peripancreatic fluid collections with well-defined enhancing wall, new since November 30, 2022, not significantly changed since May 5, 2023 upon directed review. Largest fluid collection along stomach greater curvature   8.5 x 2 x 2 cm, communicates with inferior fluid collection measuring 5 x 3 x 3 cm.  Main pancreatic duct at head/uncinate appears mildly dilated up to 5 mm and is poorly visualized at pancreatic neck body and tail. Right groin subcutaneous tissues most compatible with old hematoma or seroma 4 x 3 cm    5/9: right pigtail placement (700 cc) - apical PTX  (+)delirium reported    5/11 - extreme abd pain reported   urinary retention - coulter replaced - 600cc out  CT Head - no acute intracranial abnormality  CT C/A/P (noncontrast): dec Rt pleural effusion s/p chest tube placement, pigtail coiled in major fissure. Unchanged mod left pleural effusion with unchanged adjacent atelectasis/consolidation.  Limited pancreas evaluation. Unchanged marked fullness pancreatic body/tail with heterogenous attenuation. Stable or slightly decreased extrapancreatic/perigastric fluid collections on this suboptimal noncontrast study. Abbreviated differential includes pseudocyst, walled off necrosis abscess. Recommend follow-up contrast-enhanced study to resolution. Unchanged small moderate abdominopelvic ascites. Unchanged caliber aneurysmal thoracic and abdominal aorta status post endovascular repair.    5/12: drop in Hg 6.6 (+)hemorrhagic shock     repeat CT 5/12:  Interval worsening of the thickening of the pancreatic body and tail with hyperdense components suggesting hemorrhage. Additional cystic changes in the pancreatic head are not well evaluated on this study. Underlying pathology such as inflammation or neoplasm cannot be excluded based on these non contrast images.    intubated and taken to IR for embolization - 4 U pRBC given ; general surgery following - no arterial bleeding noted - no intervention    intercurrent seizure - epilepsy team contacted and meds adjusted ; EEG placed and ongoing     ongoing transfusion support requirement  required additional transfusion support 5/13-5/15 - 11 U pRBC/3 FFP/2 Cryo/2 plt   Tito titrated off 5/15    attempted sedation hold to assess neuro status -   serial counts have been stable and no transfusion given for 24 hours - cont to monitor  TPN restarted 5/15    in light of pancreatic risk and bleeding risk - titrating valproate down to off with guidance from epilepsy medication off as of 5/18 - no seizure on EEG (ongoing)  ongoing TPN     concern for clinical picture of SIRS/early sepsis 5/17 - prompting imaging and initiation of presumptive Abx -     Bronch/sputum 5/17: Klebsiella pneumoniae/Proteus mirabilis  Blood Cultures 5/17: Klebsiella pneumoniae  UCx (-)  repeat Culture 5/18 (-)to date   ID consultation called - Abx transitioned to Zosyn - susceptibilites to all isolates pending at this time     CTa C/A/P 5/18: multiloculated necrosis of the distal pancreas   contiguous with the low described retroperitoneal hematoma. No   pseudoaneurysm or active bleeding. Several small pseudocysts or necrotic   collections at the head of the largest 1.2 cm. Left retroperitoneal hematoma mildly   decreased in size now 16 x 10 cm, prior 20 x 11 cm. ET tube above the leeanne. Increased from prior diffuse bilateral groundglass and branching nodular opacities, possibly   combination of pulmonary edema and multifocal pneumonia    line changed 5/18 and port preserved for ongoing TPN; ETT upsized   placed on Nimbex/fentanyl/versed infusions; Armaan added   valproate titrated off per Epilepsy consultant recommendations 5/18 and vimpat increased     remains on primacor/Tito/propofol  lasix dosing given with favorable response   getting transfusion at this time     ICU Vital Signs Last 24 Hrs  T(C): 36.3 (19 May 2023 05:19), Max: 36.6 (18 May 2023 08:41)  T(F): 97.3 (19 May 2023 05:19), Max: 97.9 (18 May 2023 08:41)  HR: 85 (19 May 2023 07:00) (85 - 163) sinus   ABP: 110/61 (19 May 2023 07:00) (78/50 - 168/105)  ABP(mean): 82 (19 May 2023 07:00) (57 - 133)  RR: 20 (19 May 2023 07:00) (0 - 41)  SpO2: 100% (19 May 2023 07:00) (94% - 100%) Fi02 60%/PEEP 8; Armaan 10 PPM     Qtts:   Nimbex - off  Fentanyl - off  Versed - off  propofol  Primacor 0.125  Tito 0.5    I&O's Summary    18 May 2023 07:01  -  19 May 2023 07:00  --------------------------------------------------------  IN: 4065.9 mL / OUT: 4920 mL / NET: -854.1 mL    Mode: AC/ CMV (Assist Control/ Continuous Mandatory Ventilation)  RR (machine): 20  TV (machine): 500  FiO2: 60  PEEP: 8  ITime: 1  MAP: 12  PIP: 20    Physical Exam    Heart - regular (-)rub/gallop  Lungs - BS appreciated bilaterally - dec BS bases Rt worse than left - no wheeze   Abd - (+)BS soft NTND (-)r/r/g  Ext - diffuse edema - no cyanosis/clubbing  Neuro - sedated and paralyzed at this time - TOF 0/4; unable   Skin - no rash     LABS:                        8.5    11.31 )-----------( 157      ( 19 May 2023 04:15 )             26.0     05-19    140  |  107  |  54<H>  ----------------------------<  175<H>  3.2<L>   |  23  |  1.27    Ca    8.6      19 May 2023 03:33  Phos  3.4     05-19  Mg     1.8     05-19    TPro  5.7<L>  /  Alb  2.7<L>  /  TBili  2.3<H>  /  DBili  x   /  AST  16  /  ALT  7<L>  /  AlkPhos  64  05-19    PT/INR - ( 19 May 2023 03:33 )   PT: 16.2 sec;   INR: 1.36     PTT - ( 19 May 2023 03:33 )  PTT:31.6 sec    ABG - ( 19 May 2023 03:25 )  pH, Arterial: 7.40  pH, Blood: x     /  pCO2: 39    /  pO2: 235   / HCO3: 24    / Base Excess: -0.5  /  SaO2: 99.2      RADIOLOGY & ADDITIONAL STUDIES:  Sputum 3/27 : serratia marcescens  Bronch/sputum 5/17: Klebsiella pneumoniae/Proteus mirabilis  Blood Cultures 5/17: Klebsiella pneumoniae  UCx (-)  Blood Culture 5/18 (-) to date       Patient with complicated medical and surgical Hx with known pancreatitis and seizure disorder with uncontrolled seizures presenting for planned TEVAR 5/5 with some post-op LE weakness - improved with transfusion/inc MAP and lumbar drain - since removed with intercurrent progression of pancreatitis with bleeding/hemorrhagic shock - being followed by surgery; attempted IR - no arterial bleeding/no intervention - prior significant transfusion requirement for several days/ seizure meds titrated down - intercurrent concern for SIRS/early sepsis prompting cultures and presumptive Abx found to have Klebsiella pneumonia bacteremia - suspected GI & pulm source based on bronch 5/17      1. CV  prior Hemorrhagic shock - resolved   Sepsis - cultures (+)for Klebsiella pneumoniae; sputum with Klebsiella and proteus  Tito as needed to maintain MAP 65 or greater -   cont primacor   sinus   monitor for ongoing transfusion requirement - serial labs  if need additional products will obtain 3 phase/contrast scan to assess  ASA  diuresis     2. Neuro  valproate titrated off; EEG ongoing and to be reassessed by neuro  significant sedation and paralytic started 5/18 - to take off and assess patient   vimpat increased - to check level after 4th dose     3. GI  pancreatitis - known from prior admit on CT (asymptomatic)  valproate can be associated with pancreatitis - titrated off 5/18  no transfusion support given since 5/14 - getting transfusion now  surgery following  new line placed 5/18 - ongoing TPN  CTa C/A/P 5/18: multiloculated necrosis of the distal pancreas   contiguous with the low described retroperitoneal hematoma. No   pseudoaneurysm or active bleeding. Several small pseudocysts or necrotic   collections at the head of the largest 1.2 cm. Left retroperitoneal hematoma mildly   decreased in size now 16 x 10 cm, prior 20 x 11 cm. ET tube above the leeanne. Increased from prior diffuse bilateral groundglass and branching nodular opacities, possibly combination of pulmonary edema and multifocal pneumonia    4. Pulm   serial ABG to optimize oxygenation and ventilation   Abx targeting bronch culture  plan titrate Armaan down to off today   monitor chest tube output    5. Renal  monitor UO/Lytes and Cr  avoid nephrotoxins as much as able     serial labs to monitor closely  SCD and GI prophylaxis     d/w staff & CTS; wife updated at bedside     I have spent/provided stated minutes of nonprocedural critical care time to this patient: 90

## 2023-05-19 NOTE — PROGRESS NOTE ADULT - SUBJECTIVE AND OBJECTIVE BOX
INTERVAL HPI/OVERNIGHT EVENTS:  Patient was seen and examined at bedside. intubated and sedated    VITAL SIGNS:  T(F): 96.9 (23 @ 08:27)  HR: 102 (23 @ 10:00)  BP: --  RR: 20 (23 @ 10:00)  SpO2: 95% (23 @ 10:00)  Wt(kg): --    PHYSICAL EXAM:  Eyes closed, pupils 3mm brisk, does not follow commands. No withdrawal to painful stimuli in all limbs. B/L LE 3+ pitting edema. +cough and gag reflex. 2+ reflexes in B/L UE and absent in B/L LE, toes mute.     MEDICATIONS  (STANDING):  aspirin  chewable 81 milliGRAM(s) Oral daily  chlorhexidine 0.12% Liquid 15 milliLiter(s) Oral Mucosa every 12 hours  chlorhexidine 2% Cloths 1 Application(s) Topical daily  fentaNYL    Injectable 50 MICROGram(s) IV Push once  lacosamide IVPB 250 milliGRAM(s) IV Intermittent every 12 hours  milrinone Infusion 0.375 MICROgram(s)/kG/Min (7.88 mL/Hr) IV Continuous <Continuous>  pantoprazole  Injectable 40 milliGRAM(s) IV Push daily  Parenteral Nutrition - Adult 1 Each (70 mL/Hr) TPN Continuous <Continuous>  Parenteral Nutrition - Adult 1 Each (70 mL/Hr) TPN Continuous <Continuous>  phenylephrine    Infusion 0.1 MICROgram(s)/kG/Min (2.63 mL/Hr) IV Continuous <Continuous>  piperacillin/tazobactam IVPB.. 3.375 Gram(s) IV Intermittent every 8 hours  propofol Infusion 10 MICROgram(s)/kG/Min (4.2 mL/Hr) IV Continuous <Continuous>  sodium chloride 0.9%. 1000 milliLiter(s) (10 mL/Hr) IV Continuous <Continuous>  vancomycin  IVPB 1000 milliGRAM(s) IV Intermittent every 12 hours    MEDICATIONS  (PRN):  fentaNYL    Injectable 25 MICROGram(s) IV Push every 3 hours PRN Severe Pain (7 - 10)      Allergies    No Known Allergies    Intolerances        LABS:                        8.9    11.82 )-----------( 160      ( 19 May 2023 08:45 )             26.8         142  |  106  |  59<H>  ----------------------------<  133<H>  3.9   |  25  |  1.28    Ca    8.8      19 May 2023 08:45  Phos  3.5       Mg     2.2         TPro  5.4<L>  /  Alb  2.7<L>  /  TBili  2.4<H>  /  DBili  x   /  AST  17  /  ALT  <5<L>  /  AlkPhos  64  -19    PT/INR - ( 19 May 2023 08:45 )   PT: 15.1 sec;   INR: 1.27          PTT - ( 19 May 2023 09:52 )  PTT:29.9 sec  Urinalysis Basic - ( 17 May 2023 20:04 )    Color: Yellow / Appearance: Clear / S.020 / pH: x  Gluc: x / Ketone: NEGATIVE  / Bili: Negative / Urobili: 0.2 E.U./dL   Blood: x / Protein: 100 mg/dL / Nitrite: NEGATIVE   Leuk Esterase: NEGATIVE / RBC: 5-10 /HPF / WBC < 5 /HPF   Sq Epi: x / Non Sq Epi: x / Bacteria: Present /HPF        RADIOLOGY & ADDITIONAL TESTS:  Reviewed

## 2023-05-19 NOTE — PROGRESS NOTE ADULT - ASSESSMENT
53 year-old male with PMH HTN, type A aortic dissection s/p Dacron grafts, AV resuspension in 2013, CAD s/p CABG x 1 SVG to RCA (5/2013 with Dr. Medina), seizure disorder (last episode on 7/4/22) S/P replacement of transverse aortic arch, second stage thoracic endovascular aortic repair, aorto-axillary bypass, AV replacement (bio 23mm), in APr 2023 and CABG x 1 (SVG-RCA) EF 75% (Ignacio, 3/20) now s/p 2nd state TEVAR on 5/5, and was found to have acute pancreatitis with large peripancreatic/perigastric fluid collections on CTA abdomen on 5/8.     Epilepsy team following for suspected seizure event on 5/14/23 that did not correlate with findings on EEG recordings. Also concerns for drug-related (depakote) hemorrhagic pancreatitis, and hereby the medication has been weaned. CTH obtained on 5/18 showed acute pathology.     RECOMMENDATIONS:   - rec vEEG skin break, D/C vEEG monitoring for 5/19 resume 5/20  - c/w Vimpat 250mg Q12hrs (slightly supratherapeutic 13.15 on 5/13)  - Please obtain lacosamide level after the 4th dose  - Ativan 2mg IVP for seizures > 2mins  - Keep Mg>2 and K >4.   - Management per primary team.  53 year-old male with PMH HTN, type A aortic dissection s/p Dacron grafts, AV resuspension in 2013, CAD s/p CABG x 1 SVG to RCA (5/2013 with Dr. Medina), seizure disorder (last episode on 7/4/22) S/P replacement of transverse aortic arch, second stage thoracic endovascular aortic repair, aorto-axillary bypass, AV replacement (bio 23mm), in APr 2023 and CABG x 1 (SVG-RCA) EF 75% (Ignacio, 3/20) now s/p 2nd state TEVAR on 5/5, and was found to have acute pancreatitis with large peripancreatic/perigastric fluid collections on CTA abdomen on 5/8.     Epilepsy team following for suspected seizure event on 5/14/23 that did not correlate with findings on EEG recordings. Also concerns for drug-related (depakote) hemorrhagic pancreatitis, and hereby the medication has been weaned. CTH obtained on 5/18 showed acute pathology.     RECOMMENDATIONS:   - rec vEEG skin break, D/C vEEG monitoring for 5/19 resume 5/20 or when plan to taper sedation  - c/w Vimpat 250mg Q12hrs (slightly supratherapeutic 13.15 on 5/13)  - Please obtain lacosamide level after the 4th dose  - Ativan 2mg IVP for seizures > 2mins  - Keep Mg>2 and K >4.   - Management per primary team.

## 2023-05-19 NOTE — EEG REPORT - NS EEG TEXT BOX
Daily Updates (from 07:00 am until 07:00 am):  Day 6 5/18-5/19/2023:   Pertinent medications:, LCM 250mg BID  Background:  nearly continuous (<10% periods of attenuation), with predominantly theta > delta frequencies.  Voltage:  Normal (20+ uV)  Organization:  Rudimentary  Posterior Dominant Rhythm:  <7 Hz absent  Sleep:  Asymmetric spindles and K complexes.  Focal abnormalities:  No persistent asymmetries of voltage or frequency.  Spontaneous Activity:  No epileptiform discharges  Choose an item. Choose an item.  Symmetry:  Bilaterally symmetric  Events: none  Provocations:  7)	Hyperventilation: was not performed.  8)	Photic stimulation: was not performed.  Impression/clinical correlation:   Abnormal continuous EEG  4)	moderate generalized slowing suggestive of diffuse or multifocal cerebral dysfunction     Dary Flores MD  Attending Neurologist, [facility]

## 2023-05-19 NOTE — PROGRESS NOTE ADULT - ASSESSMENT
51 YO Male, current every day marijuana use, w/ PMHx of HTN, type A aortic dissection s/p Dacron grafts, AV resuspension in 2013, CAD s/p CABG x 1 SVG to RCA (5/2013 with Dr. Medina), seizure disorder (last episode on 7/4/22) who was admitted for surgical mgmt of progression of aneurysmal disease ID consulted for klebsiella bacteremia    #Klebsiella Bacteremia  patient w/ necrotizing pancreatitis with extension of hematoma into the retroperitoneal space possibly infected   - Obtain PET/CT to assess aortic graft for infection since close proximity to infected hematoma  - f/u culture data  - c/w Zosyn 3.375g IV q8h   - advise against any fluoroquinolones due to increased risk of aortic aneurysmal rupture  - advise to stop Vancomycin as infections are gram negative      Team 1 will continue to follow

## 2023-05-19 NOTE — PROGRESS NOTE ADULT - ASSESSMENT
This is a 54yo Male pt with PMH HTN, type A aortic dissection s/p Dacron grafts, AV resuspension in 2013, CAD s/p CABG x 1 SVG to RCA (5/2013 with Dr. Medina), seizure disorder (last episode on 7/4/22) S/P replacement of transverse aortic arch, second stage thoracic endovascular aortic repair, aorto-axillary bypass, AV replacement (bio 23mm), in APr 2023 and CABG x 1 (SVG-RCA) EF 75% (Ignacio, 3/20) now s/p 2nd state TEVAR on 5/5 for whom surgery was consulted for finding of acute pancreatitis with large peripancreatic/perigastric fluid collections on CTA abdomen 5/8 then acute hemorrhage starting 5/12/23 requiring 11 U pRBC over last 48 hours but with negative mesenteric angiogram 5/13/23 for whom hepatobiliary surgery was reconsulted for possible hemorrhagic pancreatitis.    - Monitor pancreatic necrosis without evident superinfection on CT scan 5/19 (ie, gas locules)  - Would keep NPO with TPN support given bilious NGT output.  - Trend H&H regularly.   - Appreciate excellent care per primary team. Hepatobiliary surgery continuing to follow.

## 2023-05-19 NOTE — PROGRESS NOTE ADULT - ATTENDING COMMENTS
History of epilepsy previously on VA 1 g BID and LCM 100mg BID.  Patient seen and evaluated 5/29  intubated on sedation   Complicated hospital course, found to have hemorrhagic pancreatitis  Cases of life-threatening pancreatitis have been reported in patients receiving VA, so was tapered off.  EEG has been negative for seizure.  Will d/c EEG for now to allow for skin break while still on sedation  Continue LCM 250mg BID History of epilepsy previously on VA 1 g BID and LCM 100mg BID.  Patient seen and evaluated 5/19  intubated on sedation   Complicated hospital course, found to have hemorrhagic pancreatitis  Cases of life-threatening pancreatitis have been reported in patients receiving VA, so was tapered off.  EEG has been negative for seizure.  Will d/c EEG for now to allow for skin break while still on sedation  Continue LCM 250mg BID

## 2023-05-19 NOTE — PROGRESS NOTE ADULT - SUBJECTIVE AND OBJECTIVE BOX
INFECTIOUS DISEASES CONSULT FOLLOW-UP NOTE    INTERVAL HPI/OVERNIGHT EVENTS: New central line and swan placed/       ROS:   Constitutional, eyes, ENT, cardiovascular, respiratory, gastrointestinal, genitourinary, integumentary, neurological, psychiatric and heme/lymph are otherwise negative other than noted above       ANTIBIOTICS/RELEVANT:    MEDICATIONS  (STANDING):  aspirin  chewable 81 milliGRAM(s) Oral daily  chlorhexidine 0.12% Liquid 15 milliLiter(s) Oral Mucosa every 12 hours  chlorhexidine 2% Cloths 1 Application(s) Topical daily  fentaNYL    Injectable 50 MICROGram(s) IV Push once  lacosamide IVPB 250 milliGRAM(s) IV Intermittent every 12 hours  milrinone Infusion 0.375 MICROgram(s)/kG/Min (7.88 mL/Hr) IV Continuous <Continuous>  pantoprazole  Injectable 40 milliGRAM(s) IV Push daily  Parenteral Nutrition - Adult 1 Each (70 mL/Hr) TPN Continuous <Continuous>  Parenteral Nutrition - Adult 1 Each (70 mL/Hr) TPN Continuous <Continuous>  phenylephrine    Infusion 0.1 MICROgram(s)/kG/Min (2.63 mL/Hr) IV Continuous <Continuous>  piperacillin/tazobactam IVPB.. 3.375 Gram(s) IV Intermittent every 8 hours  propofol Infusion 10 MICROgram(s)/kG/Min (4.2 mL/Hr) IV Continuous <Continuous>  sodium chloride 0.9%. 1000 milliLiter(s) (10 mL/Hr) IV Continuous <Continuous>  vancomycin  IVPB 1000 milliGRAM(s) IV Intermittent every 12 hours    MEDICATIONS  (PRN):  fentaNYL    Injectable 25 MICROGram(s) IV Push every 3 hours PRN Severe Pain (7 - 10)        Vital Signs Last 24 Hrs  T(C): 36.1 (19 May 2023 08:27), Max: 36.3 (19 May 2023 05:19)  T(F): 96.9 (19 May 2023 08:27), Max: 97.3 (19 May 2023 05:19)  HR: 83 (19 May 2023 08:00) (83 - 163)  BP: --  BP(mean): --  RR: 20 (19 May 2023 08:00) (0 - 41)  SpO2: 100% (19 May 2023 08:00) (94% - 100%)    Parameters below as of 19 May 2023 08:00  Patient On (Oxygen Delivery Method): ventilator    O2 Concentration (%): 60    23 @ 07:01  -  23 @ 07:00  --------------------------------------------------------  IN: 4065.9 mL / OUT: 4920 mL / NET: -854.1 mL    23 @ 07:01  -  23 @ 08:48  --------------------------------------------------------  IN: 10 mL / OUT: 100 mL / NET: -90 mL      PHYSICAL EXAM:  Constitutional: intubated, sedated  Eyes: the sclera and conjunctiva were normal.   ENT: the ears and nose were normal in appearance.   Neck: the appearance of the neck was normal and the neck was supple.   Pulmonary: no respiratory distress and lungs were clear to auscultation bilaterally.   Heart: heart rate was normal and rhythm regular, normal S1 and S2  Vascular:. there was no peripheral edema  Abdomen: mild distention, no rigidity   Neurological: no focal deficits.   Psychiatric: the affect was normal      LABS:                        8.5    11.31 )-----------( 157      ( 19 May 2023 04:15 )             26.0         140  |  107  |  54<H>  ----------------------------<  175<H>  3.2<L>   |  23  |  1.27    Ca    8.6      19 May 2023 03:33  Phos  3.4       Mg     1.8         TPro  5.7<L>  /  Alb  2.7<L>  /  TBili  2.3<H>  /  DBili  x   /  AST  16  /  ALT  7<L>  /  AlkPhos  64  05-19    PT/INR - ( 19 May 2023 03:33 )   PT: 16.2 sec;   INR: 1.36          PTT - ( 19 May 2023 03:33 )  PTT:31.6 sec  Urinalysis Basic - ( 17 May 2023 20:04 )    Color: Yellow / Appearance: Clear / S.020 / pH: x  Gluc: x / Ketone: NEGATIVE  / Bili: Negative / Urobili: 0.2 E.U./dL   Blood: x / Protein: 100 mg/dL / Nitrite: NEGATIVE   Leuk Esterase: NEGATIVE / RBC: 5-10 /HPF / WBC < 5 /HPF   Sq Epi: x / Non Sq Epi: x / Bacteria: Present /HPF        MICROBIOLOGY:      RADIOLOGY & ADDITIONAL STUDIES:  Reviewed

## 2023-05-20 LAB
ALBUMIN SERPL ELPH-MCNC: 2.4 G/DL — LOW (ref 3.3–5)
ALBUMIN SERPL ELPH-MCNC: 2.4 G/DL — LOW (ref 3.3–5)
ALBUMIN SERPL ELPH-MCNC: 2.5 G/DL — LOW (ref 3.3–5)
ALBUMIN SERPL ELPH-MCNC: 2.6 G/DL — LOW (ref 3.3–5)
ALP SERPL-CCNC: 65 U/L — SIGNIFICANT CHANGE UP (ref 40–120)
ALP SERPL-CCNC: 70 U/L — SIGNIFICANT CHANGE UP (ref 40–120)
ALP SERPL-CCNC: 74 U/L — SIGNIFICANT CHANGE UP (ref 40–120)
ALP SERPL-CCNC: 75 U/L — SIGNIFICANT CHANGE UP (ref 40–120)
ALT FLD-CCNC: 6 U/L — LOW (ref 10–45)
AMYLASE P1 CFR SERPL: 127 U/L — HIGH (ref 25–125)
ANION GAP SERPL CALC-SCNC: 12 MMOL/L — SIGNIFICANT CHANGE UP (ref 5–17)
ANION GAP SERPL CALC-SCNC: 12 MMOL/L — SIGNIFICANT CHANGE UP (ref 5–17)
ANION GAP SERPL CALC-SCNC: 8 MMOL/L — SIGNIFICANT CHANGE UP (ref 5–17)
ANION GAP SERPL CALC-SCNC: 9 MMOL/L — SIGNIFICANT CHANGE UP (ref 5–17)
APTT BLD: 30.1 SEC — SIGNIFICANT CHANGE UP (ref 27.5–35.5)
APTT BLD: 31 SEC — SIGNIFICANT CHANGE UP (ref 27.5–35.5)
APTT BLD: 33.7 SEC — SIGNIFICANT CHANGE UP (ref 27.5–35.5)
APTT BLD: 33.9 SEC — SIGNIFICANT CHANGE UP (ref 27.5–35.5)
AST SERPL-CCNC: 18 U/L — SIGNIFICANT CHANGE UP (ref 10–40)
AST SERPL-CCNC: 19 U/L — SIGNIFICANT CHANGE UP (ref 10–40)
AST SERPL-CCNC: 20 U/L — SIGNIFICANT CHANGE UP (ref 10–40)
AST SERPL-CCNC: 23 U/L — SIGNIFICANT CHANGE UP (ref 10–40)
BASE EXCESS BLDV CALC-SCNC: -1.1 MMOL/L — SIGNIFICANT CHANGE UP (ref -2–3)
BASE EXCESS BLDV CALC-SCNC: -1.1 MMOL/L — SIGNIFICANT CHANGE UP (ref -2–3)
BILIRUB SERPL-MCNC: 3.6 MG/DL — HIGH (ref 0.2–1.2)
BILIRUB SERPL-MCNC: 3.6 MG/DL — HIGH (ref 0.2–1.2)
BILIRUB SERPL-MCNC: 4.2 MG/DL — HIGH (ref 0.2–1.2)
BILIRUB SERPL-MCNC: 4.6 MG/DL — HIGH (ref 0.2–1.2)
BUN SERPL-MCNC: 61 MG/DL — HIGH (ref 7–23)
BUN SERPL-MCNC: 61 MG/DL — HIGH (ref 7–23)
BUN SERPL-MCNC: 62 MG/DL — HIGH (ref 7–23)
BUN SERPL-MCNC: 65 MG/DL — HIGH (ref 7–23)
CA-I BLD-SCNC: 5.2 MG/DL — SIGNIFICANT CHANGE UP (ref 4.5–5.6)
CA-I SERPL-SCNC: 1.31 MMOL/L — SIGNIFICANT CHANGE UP (ref 1.15–1.33)
CA-I SERPL-SCNC: 1.32 MMOL/L — SIGNIFICANT CHANGE UP (ref 1.15–1.33)
CALCIUM SERPL-MCNC: 8.5 MG/DL — SIGNIFICANT CHANGE UP (ref 8.4–10.5)
CALCIUM SERPL-MCNC: 8.8 MG/DL — SIGNIFICANT CHANGE UP (ref 8.4–10.5)
CALCIUM SERPL-MCNC: 9 MG/DL — SIGNIFICANT CHANGE UP (ref 8.4–10.5)
CALCIUM SERPL-MCNC: 9.1 MG/DL — SIGNIFICANT CHANGE UP (ref 8.4–10.5)
CHLORIDE SERPL-SCNC: 104 MMOL/L — SIGNIFICANT CHANGE UP (ref 96–108)
CHLORIDE SERPL-SCNC: 104 MMOL/L — SIGNIFICANT CHANGE UP (ref 96–108)
CHLORIDE SERPL-SCNC: 106 MMOL/L — SIGNIFICANT CHANGE UP (ref 96–108)
CHLORIDE SERPL-SCNC: 108 MMOL/L — SIGNIFICANT CHANGE UP (ref 96–108)
CO2 BLDV-SCNC: 25.6 MMOL/L — SIGNIFICANT CHANGE UP (ref 22–26)
CO2 BLDV-SCNC: 26.2 MMOL/L — HIGH (ref 22–26)
CO2 SERPL-SCNC: 20 MMOL/L — LOW (ref 22–31)
CO2 SERPL-SCNC: 22 MMOL/L — SIGNIFICANT CHANGE UP (ref 22–31)
CO2 SERPL-SCNC: 23 MMOL/L — SIGNIFICANT CHANGE UP (ref 22–31)
CO2 SERPL-SCNC: 24 MMOL/L — SIGNIFICANT CHANGE UP (ref 22–31)
CORTIS AM PEAK SERPL-MCNC: 25.67 UG/DL — HIGH (ref 6.02–18.4)
CREAT SERPL-MCNC: 1 MG/DL — SIGNIFICANT CHANGE UP (ref 0.5–1.3)
CREAT SERPL-MCNC: 1.11 MG/DL — SIGNIFICANT CHANGE UP (ref 0.5–1.3)
CREAT SERPL-MCNC: 1.12 MG/DL — SIGNIFICANT CHANGE UP (ref 0.5–1.3)
CREAT SERPL-MCNC: 1.24 MG/DL — SIGNIFICANT CHANGE UP (ref 0.5–1.3)
CULTURE RESULTS: SIGNIFICANT CHANGE UP
CULTURE RESULTS: SIGNIFICANT CHANGE UP
EGFR: 69 ML/MIN/1.73M2 — SIGNIFICANT CHANGE UP
EGFR: 78 ML/MIN/1.73M2 — SIGNIFICANT CHANGE UP
EGFR: 79 ML/MIN/1.73M2 — SIGNIFICANT CHANGE UP
EGFR: 89 ML/MIN/1.73M2 — SIGNIFICANT CHANGE UP
GAS PNL BLDA: SIGNIFICANT CHANGE UP
GAS PNL BLDV: 135 MMOL/L — LOW (ref 136–145)
GAS PNL BLDV: 136 MMOL/L — SIGNIFICANT CHANGE UP (ref 136–145)
GAS PNL BLDV: SIGNIFICANT CHANGE UP
GLUCOSE SERPL-MCNC: 133 MG/DL — HIGH (ref 70–99)
GLUCOSE SERPL-MCNC: 139 MG/DL — HIGH (ref 70–99)
GLUCOSE SERPL-MCNC: 154 MG/DL — HIGH (ref 70–99)
GLUCOSE SERPL-MCNC: 179 MG/DL — HIGH (ref 70–99)
GRAM STN FLD: SIGNIFICANT CHANGE UP
HCO3 BLDV-SCNC: 24 MMOL/L — SIGNIFICANT CHANGE UP (ref 22–29)
HCO3 BLDV-SCNC: 25 MMOL/L — SIGNIFICANT CHANGE UP (ref 22–29)
HCT VFR BLD CALC: 29.7 % — LOW (ref 39–50)
HCT VFR BLD CALC: 31.1 % — LOW (ref 39–50)
HCT VFR BLD CALC: 31.5 % — LOW (ref 39–50)
HCT VFR BLD CALC: 35.8 % — LOW (ref 39–50)
HGB BLD-MCNC: 10.4 G/DL — LOW (ref 13–17)
HGB BLD-MCNC: 10.7 G/DL — LOW (ref 13–17)
HGB BLD-MCNC: 11.8 G/DL — LOW (ref 13–17)
HGB BLD-MCNC: 9.9 G/DL — LOW (ref 13–17)
INR BLD: 1.32 — HIGH (ref 0.88–1.16)
INR BLD: 1.34 — HIGH (ref 0.88–1.16)
INR BLD: 1.38 — HIGH (ref 0.88–1.16)
LACTATE SERPL-SCNC: 0.9 MMOL/L — SIGNIFICANT CHANGE UP (ref 0.5–2)
LACTATE SERPL-SCNC: 0.9 MMOL/L — SIGNIFICANT CHANGE UP (ref 0.5–2)
LACTATE SERPL-SCNC: 1.1 MMOL/L — SIGNIFICANT CHANGE UP (ref 0.5–2)
LACTATE SERPL-SCNC: 1.6 MMOL/L — SIGNIFICANT CHANGE UP (ref 0.5–2)
LIDOCAIN IGE QN: 259 U/L — HIGH (ref 7–60)
MAGNESIUM SERPL-MCNC: 2 MG/DL — SIGNIFICANT CHANGE UP (ref 1.6–2.6)
MAGNESIUM SERPL-MCNC: 2.1 MG/DL — SIGNIFICANT CHANGE UP (ref 1.6–2.6)
MCHC RBC-ENTMCNC: 30.3 PG — SIGNIFICANT CHANGE UP (ref 27–34)
MCHC RBC-ENTMCNC: 30.6 PG — SIGNIFICANT CHANGE UP (ref 27–34)
MCHC RBC-ENTMCNC: 30.7 PG — SIGNIFICANT CHANGE UP (ref 27–34)
MCHC RBC-ENTMCNC: 30.7 PG — SIGNIFICANT CHANGE UP (ref 27–34)
MCHC RBC-ENTMCNC: 33 GM/DL — SIGNIFICANT CHANGE UP (ref 32–36)
MCHC RBC-ENTMCNC: 33.3 GM/DL — SIGNIFICANT CHANGE UP (ref 32–36)
MCHC RBC-ENTMCNC: 33.4 GM/DL — SIGNIFICANT CHANGE UP (ref 32–36)
MCHC RBC-ENTMCNC: 34 GM/DL — SIGNIFICANT CHANGE UP (ref 32–36)
MCV RBC AUTO: 90 FL — SIGNIFICANT CHANGE UP (ref 80–100)
MCV RBC AUTO: 91.7 FL — SIGNIFICANT CHANGE UP (ref 80–100)
MCV RBC AUTO: 91.8 FL — SIGNIFICANT CHANGE UP (ref 80–100)
MCV RBC AUTO: 92.2 FL — SIGNIFICANT CHANGE UP (ref 80–100)
NRBC # BLD: 0 /100 WBCS — SIGNIFICANT CHANGE UP (ref 0–0)
ORGANISM # SPEC MICROSCOPIC CNT: SIGNIFICANT CHANGE UP
PCO2 BLDV: 42 MMHG — SIGNIFICANT CHANGE UP (ref 42–55)
PCO2 BLDV: 45 MMHG — SIGNIFICANT CHANGE UP (ref 42–55)
PH BLDV: 7.35 — SIGNIFICANT CHANGE UP (ref 7.32–7.43)
PH BLDV: 7.37 — SIGNIFICANT CHANGE UP (ref 7.32–7.43)
PHOSPHATE SERPL-MCNC: 2.8 MG/DL — SIGNIFICANT CHANGE UP (ref 2.5–4.5)
PHOSPHATE SERPL-MCNC: 3.1 MG/DL — SIGNIFICANT CHANGE UP (ref 2.5–4.5)
PHOSPHATE SERPL-MCNC: 3.3 MG/DL — SIGNIFICANT CHANGE UP (ref 2.5–4.5)
PHOSPHATE SERPL-MCNC: 3.3 MG/DL — SIGNIFICANT CHANGE UP (ref 2.5–4.5)
PLATELET # BLD AUTO: 204 K/UL — SIGNIFICANT CHANGE UP (ref 150–400)
PLATELET # BLD AUTO: 208 K/UL — SIGNIFICANT CHANGE UP (ref 150–400)
PLATELET # BLD AUTO: 214 K/UL — SIGNIFICANT CHANGE UP (ref 150–400)
PLATELET # BLD AUTO: 261 K/UL — SIGNIFICANT CHANGE UP (ref 150–400)
PO2 BLDV: 45 MMHG — SIGNIFICANT CHANGE UP (ref 25–45)
PO2 BLDV: 54 MMHG — HIGH (ref 25–45)
POTASSIUM BLDV-SCNC: 3.8 MMOL/L — SIGNIFICANT CHANGE UP (ref 3.5–5.1)
POTASSIUM BLDV-SCNC: 4 MMOL/L — SIGNIFICANT CHANGE UP (ref 3.5–5.1)
POTASSIUM SERPL-MCNC: 3.8 MMOL/L — SIGNIFICANT CHANGE UP (ref 3.5–5.3)
POTASSIUM SERPL-MCNC: 3.9 MMOL/L — SIGNIFICANT CHANGE UP (ref 3.5–5.3)
POTASSIUM SERPL-MCNC: 4 MMOL/L — SIGNIFICANT CHANGE UP (ref 3.5–5.3)
POTASSIUM SERPL-MCNC: 4 MMOL/L — SIGNIFICANT CHANGE UP (ref 3.5–5.3)
POTASSIUM SERPL-SCNC: 3.8 MMOL/L — SIGNIFICANT CHANGE UP (ref 3.5–5.3)
POTASSIUM SERPL-SCNC: 3.9 MMOL/L — SIGNIFICANT CHANGE UP (ref 3.5–5.3)
POTASSIUM SERPL-SCNC: 4 MMOL/L — SIGNIFICANT CHANGE UP (ref 3.5–5.3)
POTASSIUM SERPL-SCNC: 4 MMOL/L — SIGNIFICANT CHANGE UP (ref 3.5–5.3)
PROT SERPL-MCNC: 5.6 G/DL — LOW (ref 6–8.3)
PROT SERPL-MCNC: 5.8 G/DL — LOW (ref 6–8.3)
PROT SERPL-MCNC: 5.8 G/DL — LOW (ref 6–8.3)
PROT SERPL-MCNC: 5.9 G/DL — LOW (ref 6–8.3)
PROTHROM AB SERPL-ACNC: 15.8 SEC — HIGH (ref 10.5–13.4)
PROTHROM AB SERPL-ACNC: 16 SEC — HIGH (ref 10.5–13.4)
PROTHROM AB SERPL-ACNC: 16.5 SEC — HIGH (ref 10.5–13.4)
RBC # BLD: 3.22 M/UL — LOW (ref 4.2–5.8)
RBC # BLD: 3.39 M/UL — LOW (ref 4.2–5.8)
RBC # BLD: 3.5 M/UL — LOW (ref 4.2–5.8)
RBC # BLD: 3.9 M/UL — LOW (ref 4.2–5.8)
RBC # FLD: 16 % — HIGH (ref 10.3–14.5)
RBC # FLD: 16.1 % — HIGH (ref 10.3–14.5)
RBC # FLD: 16.1 % — HIGH (ref 10.3–14.5)
RBC # FLD: 16.2 % — HIGH (ref 10.3–14.5)
SAO2 % BLDV: 74.4 % — SIGNIFICANT CHANGE UP (ref 67–88)
SAO2 % BLDV: 83.7 % — SIGNIFICANT CHANGE UP (ref 67–88)
SODIUM SERPL-SCNC: 136 MMOL/L — SIGNIFICANT CHANGE UP (ref 135–145)
SODIUM SERPL-SCNC: 138 MMOL/L — SIGNIFICANT CHANGE UP (ref 135–145)
SODIUM SERPL-SCNC: 138 MMOL/L — SIGNIFICANT CHANGE UP (ref 135–145)
SODIUM SERPL-SCNC: 140 MMOL/L — SIGNIFICANT CHANGE UP (ref 135–145)
SPECIMEN SOURCE: SIGNIFICANT CHANGE UP
SPECIMEN SOURCE: SIGNIFICANT CHANGE UP
TRIGL SERPL-MCNC: 130 MG/DL — SIGNIFICANT CHANGE UP
VANCOMYCIN TROUGH SERPL-MCNC: 14.1 UG/ML — SIGNIFICANT CHANGE UP (ref 10–20)
VANCOMYCIN TROUGH SERPL-MCNC: 17.3 UG/ML — SIGNIFICANT CHANGE UP (ref 10–20)
WBC # BLD: 12.22 K/UL — HIGH (ref 3.8–10.5)
WBC # BLD: 16.09 K/UL — HIGH (ref 3.8–10.5)
WBC # BLD: 16.33 K/UL — HIGH (ref 3.8–10.5)
WBC # BLD: 16.37 K/UL — HIGH (ref 3.8–10.5)
WBC # FLD AUTO: 12.22 K/UL — HIGH (ref 3.8–10.5)
WBC # FLD AUTO: 16.09 K/UL — HIGH (ref 3.8–10.5)
WBC # FLD AUTO: 16.33 K/UL — HIGH (ref 3.8–10.5)
WBC # FLD AUTO: 16.37 K/UL — HIGH (ref 3.8–10.5)

## 2023-05-20 RX ORDER — I.V. FAT EMULSION 20 G/100ML
20 EMULSION INTRAVENOUS
Qty: 1400 | Refills: 0 | Status: DISCONTINUED | OUTPATIENT
Start: 2023-05-20 | End: 2023-05-20

## 2023-05-20 RX ORDER — METOPROLOL TARTRATE 50 MG
5 TABLET ORAL ONCE
Refills: 0 | Status: COMPLETED | OUTPATIENT
Start: 2023-05-20 | End: 2023-05-20

## 2023-05-20 RX ORDER — ACETAMINOPHEN 500 MG
1000 TABLET ORAL ONCE
Refills: 0 | Status: COMPLETED | OUTPATIENT
Start: 2023-05-20 | End: 2023-05-20

## 2023-05-20 RX ORDER — CISATRACURIUM BESYLATE 2 MG/ML
10 INJECTION INTRAVENOUS ONCE
Refills: 0 | Status: COMPLETED | OUTPATIENT
Start: 2023-05-20 | End: 2023-05-20

## 2023-05-20 RX ORDER — POTASSIUM CHLORIDE 20 MEQ
20 PACKET (EA) ORAL ONCE
Refills: 0 | Status: COMPLETED | OUTPATIENT
Start: 2023-05-20 | End: 2023-05-20

## 2023-05-20 RX ORDER — FENTANYL CITRATE 50 UG/ML
50 INJECTION INTRAVENOUS ONCE
Refills: 0 | Status: DISCONTINUED | OUTPATIENT
Start: 2023-05-20 | End: 2023-05-20

## 2023-05-20 RX ORDER — DEXMEDETOMIDINE HYDROCHLORIDE IN 0.9% SODIUM CHLORIDE 4 UG/ML
0.2 INJECTION INTRAVENOUS
Qty: 400 | Refills: 0 | Status: DISCONTINUED | OUTPATIENT
Start: 2023-05-20 | End: 2023-05-21

## 2023-05-20 RX ORDER — VASOPRESSIN 20 [USP'U]/ML
0.02 INJECTION INTRAVENOUS
Qty: 40 | Refills: 0 | Status: DISCONTINUED | OUTPATIENT
Start: 2023-05-20 | End: 2023-05-21

## 2023-05-20 RX ORDER — ACETYLCYSTEINE 200 MG/ML
4 VIAL (ML) MISCELLANEOUS ONCE
Refills: 0 | Status: COMPLETED | OUTPATIENT
Start: 2023-05-20 | End: 2023-05-20

## 2023-05-20 RX ORDER — I.V. FAT EMULSION 20 G/100ML
0.71 EMULSION INTRAVENOUS
Qty: 50 | Refills: 0 | Status: DISCONTINUED | OUTPATIENT
Start: 2023-05-20 | End: 2023-05-20

## 2023-05-20 RX ORDER — ALBUMIN HUMAN 25 %
50 VIAL (ML) INTRAVENOUS ONCE
Refills: 0 | Status: COMPLETED | OUTPATIENT
Start: 2023-05-20 | End: 2023-05-20

## 2023-05-20 RX ORDER — ELECTROLYTE SOLUTION,INJ
1 VIAL (ML) INTRAVENOUS
Refills: 0 | Status: DISCONTINUED | OUTPATIENT
Start: 2023-05-20 | End: 2023-05-20

## 2023-05-20 RX ORDER — PHENYLEPHRINE HYDROCHLORIDE 10 MG/ML
0.1 INJECTION INTRAVENOUS
Qty: 40 | Refills: 0 | Status: DISCONTINUED | OUTPATIENT
Start: 2023-05-20 | End: 2023-05-20

## 2023-05-20 RX ORDER — FUROSEMIDE 40 MG
40 TABLET ORAL ONCE
Refills: 0 | Status: COMPLETED | OUTPATIENT
Start: 2023-05-20 | End: 2023-05-20

## 2023-05-20 RX ORDER — MIDAZOLAM HYDROCHLORIDE 1 MG/ML
2 INJECTION, SOLUTION INTRAMUSCULAR; INTRAVENOUS ONCE
Refills: 0 | Status: DISCONTINUED | OUTPATIENT
Start: 2023-05-20 | End: 2023-05-20

## 2023-05-20 RX ADMIN — FENTANYL CITRATE 50 MICROGRAM(S): 50 INJECTION INTRAVENOUS at 18:50

## 2023-05-20 RX ADMIN — MIDAZOLAM HYDROCHLORIDE 2 MILLIGRAM(S): 1 INJECTION, SOLUTION INTRAMUSCULAR; INTRAVENOUS at 23:20

## 2023-05-20 RX ADMIN — Medication 50 MILLILITER(S): at 21:00

## 2023-05-20 RX ADMIN — Medication 1000 MILLIGRAM(S): at 18:50

## 2023-05-20 RX ADMIN — DEXMEDETOMIDINE HYDROCHLORIDE IN 0.9% SODIUM CHLORIDE 3.5 MICROGRAM(S)/KG/HR: 4 INJECTION INTRAVENOUS at 18:35

## 2023-05-20 RX ADMIN — FENTANYL CITRATE 25 MICROGRAM(S): 50 INJECTION INTRAVENOUS at 17:57

## 2023-05-20 RX ADMIN — CHLORHEXIDINE GLUCONATE 15 MILLILITER(S): 213 SOLUTION TOPICAL at 18:40

## 2023-05-20 RX ADMIN — I.V. FAT EMULSION 20.8 GM/KG/DAY: 20 EMULSION INTRAVENOUS at 18:08

## 2023-05-20 RX ADMIN — FENTANYL CITRATE 25 MICROGRAM(S): 50 INJECTION INTRAVENOUS at 00:45

## 2023-05-20 RX ADMIN — Medication 400 MILLIGRAM(S): at 18:24

## 2023-05-20 RX ADMIN — FENTANYL CITRATE 50 MICROGRAM(S): 50 INJECTION INTRAVENOUS at 18:40

## 2023-05-20 RX ADMIN — CHLORHEXIDINE GLUCONATE 1 APPLICATION(S): 213 SOLUTION TOPICAL at 07:06

## 2023-05-20 RX ADMIN — FENTANYL CITRATE 50 MICROGRAM(S): 50 INJECTION INTRAVENOUS at 12:15

## 2023-05-20 RX ADMIN — PIPERACILLIN AND TAZOBACTAM 25 GRAM(S): 4; .5 INJECTION, POWDER, LYOPHILIZED, FOR SOLUTION INTRAVENOUS at 22:00

## 2023-05-20 RX ADMIN — LACOSAMIDE 150 MILLIGRAM(S): 50 TABLET ORAL at 23:21

## 2023-05-20 RX ADMIN — PROPOFOL 4.2 MICROGRAM(S)/KG/MIN: 10 INJECTION, EMULSION INTRAVENOUS at 13:12

## 2023-05-20 RX ADMIN — Medication 400 MILLIGRAM(S): at 10:17

## 2023-05-20 RX ADMIN — CHLORHEXIDINE GLUCONATE 15 MILLILITER(S): 213 SOLUTION TOPICAL at 07:05

## 2023-05-20 RX ADMIN — Medication 40 MILLIGRAM(S): at 16:45

## 2023-05-20 RX ADMIN — Medication 5 MILLIGRAM(S): at 23:28

## 2023-05-20 RX ADMIN — Medication 250 MILLIGRAM(S): at 19:15

## 2023-05-20 RX ADMIN — VASOPRESSIN 3 UNIT(S)/MIN: 20 INJECTION INTRAVENOUS at 13:13

## 2023-05-20 RX ADMIN — LACOSAMIDE 150 MILLIGRAM(S): 50 TABLET ORAL at 11:24

## 2023-05-20 RX ADMIN — Medication 1000 MILLIGRAM(S): at 10:30

## 2023-05-20 RX ADMIN — Medication 1 EACH: at 18:07

## 2023-05-20 RX ADMIN — CISATRACURIUM BESYLATE 10 MILLIGRAM(S): 2 INJECTION INTRAVENOUS at 23:15

## 2023-05-20 RX ADMIN — FENTANYL CITRATE 50 MICROGRAM(S): 50 INJECTION INTRAVENOUS at 12:00

## 2023-05-20 RX ADMIN — FENTANYL CITRATE 25 MICROGRAM(S): 50 INJECTION INTRAVENOUS at 00:15

## 2023-05-20 RX ADMIN — Medication 100 MILLIEQUIVALENT(S): at 05:00

## 2023-05-20 RX ADMIN — MIDAZOLAM HYDROCHLORIDE 2 MILLIGRAM(S): 1 INJECTION, SOLUTION INTRAMUSCULAR; INTRAVENOUS at 20:50

## 2023-05-20 RX ADMIN — FENTANYL CITRATE 25 MICROGRAM(S): 50 INJECTION INTRAVENOUS at 21:00

## 2023-05-20 RX ADMIN — FENTANYL CITRATE 25 MICROGRAM(S): 50 INJECTION INTRAVENOUS at 20:40

## 2023-05-20 RX ADMIN — Medication 4 MILLILITER(S): at 07:33

## 2023-05-20 RX ADMIN — FENTANYL CITRATE 25 MICROGRAM(S): 50 INJECTION INTRAVENOUS at 18:15

## 2023-05-20 RX ADMIN — Medication 250 MILLIGRAM(S): at 07:05

## 2023-05-20 RX ADMIN — CISATRACURIUM BESYLATE 10 MILLIGRAM(S): 2 INJECTION INTRAVENOUS at 12:00

## 2023-05-20 RX ADMIN — PIPERACILLIN AND TAZOBACTAM 25 GRAM(S): 4; .5 INJECTION, POWDER, LYOPHILIZED, FOR SOLUTION INTRAVENOUS at 15:25

## 2023-05-20 RX ADMIN — PIPERACILLIN AND TAZOBACTAM 25 GRAM(S): 4; .5 INJECTION, POWDER, LYOPHILIZED, FOR SOLUTION INTRAVENOUS at 08:35

## 2023-05-20 RX ADMIN — PANTOPRAZOLE SODIUM 40 MILLIGRAM(S): 20 TABLET, DELAYED RELEASE ORAL at 13:13

## 2023-05-20 RX ADMIN — DEXMEDETOMIDINE HYDROCHLORIDE IN 0.9% SODIUM CHLORIDE 3.5 MICROGRAM(S)/KG/HR: 4 INJECTION INTRAVENOUS at 02:00

## 2023-05-20 NOTE — PROGRESS NOTE ADULT - SUBJECTIVE AND OBJECTIVE BOX
CTICU  CRITICAL  CARE  attending     Hand off received 					   Pertinent clinical, laboratory, radiographic, hemodynamic, echocardiographic, respiratory data, microbiologic data and chart were reviewed and analyzed frequently throughout the course of the day and night  Patient seen and examined with CTS/ SH attending at bedside  Pt is a 53y , Male, HEALTH ISSUES - PROBLEM Dx:      , FAMILY HISTORY:  No pertinent family history in first degree relatives    PAST MEDICAL & SURGICAL HISTORY:  HTN (hypertension)      Aortic dissection      CAD (coronary artery disease)      Seizure disorder      S/P aortic bifurcation bypass graft      S/P CABG x 1        Patient is a 53y old  Male who presents with a chief complaint of endoleak, abdominal pain (20 May 2023 21:02)      14 system review was unremarkable    Vital signs, hemodynamic and respiratory parameters were reviewed from the bedside nursing flowsheet.  ICU Vital Signs Last 24 Hrs  T(C): 36 (20 May 2023 21:30), Max: 36.7 (20 May 2023 05:09)  T(F): 96.8 (20 May 2023 21:30), Max: 98.1 (20 May 2023 05:09)  HR: 137 (21 May 2023 00:00) (89 - 144)  BP: --  BP(mean): --  ABP: 104/62 (21 May 2023 00:00) (90/56 - 161/93)  ABP(mean): 74 (21 May 2023 00:00) (64 - 121)  RR: 28 (21 May 2023 00:00) (18 - 34)  SpO2: 99% (21 May 2023 00:00) (97% - 100%)    O2 Parameters below as of 21 May 2023 00:00  Patient On (Oxygen Delivery Method): ventilator    O2 Concentration (%): 50      Adult Advanced Hemodynamics Last 24 Hrs  CVP(mm Hg): 14 (21 May 2023 00:00) (4 - 26)  CVP(cm H2O): --  CO: --  CI: --  PA: --  PA(mean): --  PCWP: --  SVR: --  SVRI: --  PVR: --  PVRI: --, ABG - ( 20 May 2023 22:19 )  pH, Arterial: 7.44  pH, Blood: x     /  pCO2: 29    /  pO2: 82    / HCO3: 20    / Base Excess: -3.2  /  SaO2: 98.6              Mode: AC/ CMV (Assist Control/ Continuous Mandatory Ventilation)  RR (machine): 20  TV (machine): 500  FiO2: 50  PEEP: 8  ITime: 1.2  MAP: 15  PIP: 30    Intake and output was reviewed and the fluid balance was calculated  Daily     Daily   I&O's Summary    19 May 2023 07:01  -  20 May 2023 07:00  --------------------------------------------------------  IN: 3460 mL / OUT: 3730 mL / NET: -270 mL    20 May 2023 07:01  -  21 May 2023 00:18  --------------------------------------------------------  IN: 2981.7 mL / OUT: 2545 mL / NET: 436.7 mL        All lines and drain sites were assessed  Glycemic trend was reviewedCAPILLARY BLOOD GLUCOSE        No acute change in mental status  Auscultation of the chest reveals equal bs  Abdomen is soft  Extremities are warm and well perfused  Wounds appear clean and unremarkable  Antibiotics are periop    labs  CBC Full  -  ( 20 May 2023 21:47 )  WBC Count : 12.22 K/uL  RBC Count : 3.90 M/uL  Hemoglobin : 11.8 g/dL  Hematocrit : 35.8 %  Platelet Count - Automated : 261 K/uL  Mean Cell Volume : 91.8 fl  Mean Cell Hemoglobin : 30.3 pg  Mean Cell Hemoglobin Concentration : 33.0 gm/dL  Auto Neutrophil # : x  Auto Lymphocyte # : x  Auto Monocyte # : x  Auto Eosinophil # : x  Auto Basophil # : x  Auto Neutrophil % : x  Auto Lymphocyte % : x  Auto Monocyte % : x  Auto Eosinophil % : x  Auto Basophil % : x    05-20    136  |  104  |  62<H>  ----------------------------<  179<H>  4.0   |  20<L>  |  1.00    Ca    8.5      20 May 2023 21:47  Phos  3.3     05-20  Mg     2.0     05-20    TPro  5.6<L>  /  Alb  2.6<L>  /  TBili  4.6<H>  /  DBili  x   /  AST  20  /  ALT  6<L>  /  AlkPhos  65  05-20    PT/INR - ( 20 May 2023 21:47 )   PT: 16.0 sec;   INR: 1.34          PTT - ( 20 May 2023 21:47 )  PTT:33.9 sec  The current medications were reviewed   MEDICATIONS  (STANDING):  aspirin  chewable 81 milliGRAM(s) Oral daily  chlorhexidine 0.12% Liquid 15 milliLiter(s) Oral Mucosa every 12 hours  chlorhexidine 2% Cloths 1 Application(s) Topical daily  dexMEDEtomidine Infusion 0.2 MICROgram(s)/kG/Hr (3.5 mL/Hr) IV Continuous <Continuous>  fat emulsion (Fish Oil and Plant Based) 20% Infusion 0.7143 Gm/kG/Day (20.8 mL/Hr) IV Continuous <Continuous>  lacosamide IVPB 250 milliGRAM(s) IV Intermittent every 12 hours  pantoprazole  Injectable 40 milliGRAM(s) IV Push daily  Parenteral Nutrition - Adult 1 Each (70 mL/Hr) TPN Continuous <Continuous>  piperacillin/tazobactam IVPB.. 3.375 Gram(s) IV Intermittent every 8 hours  propofol Infusion 10 MICROgram(s)/kG/Min (4.2 mL/Hr) IV Continuous <Continuous>  sodium chloride 0.9%. 1000 milliLiter(s) (10 mL/Hr) IV Continuous <Continuous>  vasopressin Infusion 0.02 Unit(s)/Min (3 mL/Hr) IV Continuous <Continuous>    MEDICATIONS  (PRN):  fentaNYL    Injectable 25 MICROGram(s) IV Push every 3 hours PRN Severe Pain (7 - 10)       PROBLEM LIST/ ASSESSMENT:  HEALTH ISSUES - PROBLEM Dx:      ,   Patient is a 53y old  Male who presents with a chief complaint of endoleak, abdominal pain (20 May 2023 21:02)     s/p cardiac surgery    Acute systolic and diastolic heart failure evidenced by SOB and parenchymal infiltrates; will treat with diuresis    Cardiogenic shock on ionotropy    Vasogenic shock due to hypotension in the cticu , will keep on pressors      Acute blood loss anemia with relative hypotension treated with > 1 unit PC      Acute respiratory failure ruled in due to prolonged mechanical ventilation > 24 hrs on the vent due to failure to wean due to weak respiratory mechanics    Toxic metabolic encephalopathy ; sundowning due to anesthesia pain medications    Acidosis evidenced by anion gap and negative base excess    Acute kidney injury - creatinine > 0.3 due to combined prerenal and intrarenal factors can presume ATN      Moderate protein calorie malutrition        My plan includes :    bronch down to reliev left white out    diurese    sedation vacation   close hemodynamic, ventilatory and drain monitoring and management per post op routine    Monitor for arrhythmias and monitor parameters for organ perfusion  beta blockade not administered due to hemodynamic instability and bradycardia  monitor neurologic status  Head of the bed should remain elevated to 45 deg .   chest PT and IS will be encouraged  monitor adequacy of oxygenation and ventilation and attempt to wean oxygen  antibiotic regimen will be tailored to the clinical, laboratory and microbiologic data  Nutritional goals will be met using po eventually , ensure adequate caloric intake and montior the same  Stress ulcer and VTE prophylaxis will be achieved    Glycemic control is satisfactory  Electrolytes have been repleted as necessary and wound care has been carried out. Pain control has been achieved.   agressive physical therapy and early mobility and ambulation goals will be met   The family was updated about the course and plan  CRITICAL CARE TIME Upon my evaluation, this patient had a high probability of imminent or life-threatening deterioration due to the above problems which required my direct attention, intervention, and personal management.  I have personally provided 110 minutes of critical care time exclusive of time spent on separately billable procedures. Time included review of laboratory data, radiology results, discussion with consultants, and monitoring for potential decompensation. Interventions were performed as documented abovepersonally provided by me  in evaluation and management, reassessments, review and interpretation of labs and x-rays, ventilator and hemodynamic management, formulating a plan and coordinating care: ___110___ MIN.  Time does not include procedural time.    CTICU ATTENDING     					    Bubba Craft MD
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
## 2023-05-20 NOTE — PROGRESS NOTE ADULT - SUBJECTIVE AND OBJECTIVE BOX
INTERVAL HPI/OVERNIGHT EVENTS:  Tm 101 last night, afebrile so far today.  CXR this morning with opacification L hemithorax.  Bronch done, copious secretions seen, CXR improved.  On low dose pressors in setting of sedation for bronch    ROS:  Unobtainable - intubated, sedated      ANTIBIOTICS/RELEVANT:          Vital Signs Last 24 Hrs  T(C): 36.1 (20 May 2023 17:20), Max: 38.3 (19 May 2023 21:21)  T(F): 97 (20 May 2023 17:20), Max: 101 (19 May 2023 21:21)  HR: 137 (20 May 2023 20:00) (89 - 142)  BP: --  BP(mean): --  RR: 34 (20 May 2023 20:00) (18 - 34)  SpO2: 100% (20 May 2023 20:00) (97% - 100%)    Parameters below as of 20 May 2023 20:00  Patient On (Oxygen Delivery Method): ventilator    O2 Concentration (%): 50    PHYSICAL EXAM:  Constitutional:  Intubated, sedated  Eyes:  Sclerae anicteric, conjunctivae clear,   Ear/Nose/Throat:  NGT with bilious drainage.  No nasal exudate or sinus tenderness;  No buccal mucosal lesions, no pharyngeal erythema or exudate	  Neck:  Supple, no adenopathy  Respiratory:  Clear bilaterally  Cardiovascular:  Midsternal incision well healed.  RRR, S1S2, II/VI syst murmur at apex  Gastrointestinal:  Symmetric, normoactive BS, soft, NT, no masses, guarding or rebound.  No HSM  Extremities:  No edema      LABS:                        9.9    16.09 )-----------( 208      ( 20 May 2023 13:18 )             29.7         05-20    138  |  106  |  65<H>  ----------------------------<  154<H>  3.9   |  23  |  1.11    Ca    8.8      20 May 2023 13:18  Phos  3.3     05-20  Mg     2.1     05-20    TPro  5.8<L>  /  Alb  2.4<L>  /  TBili  4.2<H>  /  DBili  x   /  AST  23  /  ALT  6<L>  /  AlkPhos  75  05-20          MICROBIOLOGY:        RADIOLOGY & ADDITIONAL STUDIES:

## 2023-05-20 NOTE — PROGRESS NOTE ADULT - ASSESSMENT
52 yo male hx seizure disorder, aortic dissection s/p AVR, aortic graft revision 3/20/2023, second stage TEVAR 5/5/2023, course c/b peripancreatic/RP hemorrhage/hematoma formation. Klebsiella BSI i/s/o VAP and pancreatic necrosis/RP hemorrhage (adjacent to aortic vascular graft). f/u surveillance bcx 5/18, blood cultures X 2 from 5/19, sputum culture 5/19, bronch culture from today; given sz disorder, changed cefepime to Zosyn 5/18. Consider PET CT given proximity of infected necrosis to vascular graft to assess for vascular graft infection  Advise continue Zosyn and d/c vancomycin (no evidence of resistant gram positive infection)  Recommendations discussed with primary team.  Will follow with you – team 1.  I will cover through 5/21, Dr. Larkin will resume care 5/22.

## 2023-05-20 NOTE — PROGRESS NOTE ADULT - ASSESSMENT
This is a 52yo Male pt with PMH HTN, type A aortic dissection s/p Dacron grafts, AV resuspension in 2013, CAD s/p CABG x 1 SVG to RCA (5/2013 with Dr. Medina), seizure disorder (last episode on 7/4/22) S/P replacement of transverse aortic arch, second stage thoracic endovascular aortic repair, aorto-axillary bypass, AV replacement (bio 23mm), in APr 2023 and CABG x 1 (SVG-RCA) EF 75% (Ignacio, 3/20) now s/p 2nd state TEVAR on 5/5 for whom surgery was consulted for finding of acute pancreatitis with large peripancreatic/perigastric fluid collections on CTA abdomen 5/8 then acute hemorrhage starting 5/12/23 requiring 11 U pRBC over last 48 hours but with negative mesenteric angiogram 5/13/23 for whom hepatobiliary surgery was reconsulted for possible hemorrhagic pancreatitis. This is a 54yo Male pt with PMH HTN, type A aortic dissection s/p Dacron grafts, AV resuspension in 2013, CAD s/p CABG x 1 SVG to RCA (5/2013 with Dr. Medina), seizure disorder (last episode on 7/4/22) S/P replacement of transverse aortic arch, second stage thoracic endovascular aortic repair, aorto-axillary bypass, AV replacement (bio 23mm), in APr 2023 and CABG x 1 (SVG-RCA) EF 75% (Ignacio, 3/20) now s/p 2nd state TEVAR on 5/5 for whom surgery was consulted for finding of acute pancreatitis with large peripancreatic/perigastric fluid collections on CTA abdomen 5/8 then acute hemorrhage starting 5/12/23 requiring 11 U pRBC over last 48 hours but with negative mesenteric angiogram 5/13/23 for whom hepatobiliary surgery was reconsulted for possible hemorrhagic pancreatitis.    Recommendations  - If performing thoracentesis, please send fluid for amylase  - Would keep NPO with TPN support given high bilious NGT output.  - Trend H&H regularly.   - Appreciate excellent care per primary team. Hepatobiliary surgery continuing to follow.

## 2023-05-20 NOTE — PROCEDURE NOTE - NSBRONCHPROCDETAILS_GEN_A_CORE_FT
Flexible fiberoptic bronchoscopy was performed under propofol sedation while the patient was intubated on mechanical ventilation.    Pre-procedural assessment reveals no risk of TB  Ventilator settings were adjusted to reduce airway pressures to reduce the risk of ptx.    The scope was advanced past the ett which was noted to be 1-2 cm the leeanne   Found very thick, purulent white secretions that were very difficult to suction.  Tried Mucomyst nebulizer to loosen up the secretions.  Lavaged and aspirated as tolerated.    CXR confirmed no pneumothorax post bronch and slightly more aerated left lung post bronch.   There were no complications  The patient tolerated the procedure well

## 2023-05-20 NOTE — PROGRESS NOTE ADULT - SUBJECTIVE AND OBJECTIVE BOX
HPI:  51 YO Male, current every day marijuana use, w/ PMHx of HTN, type A aortic dissection s/p Dacron grafts, AV resuspension in 2013, CAD s/p CABG x 1 SVG to RCA (5/2013 with Dr. Medina), seizure disorder (last episode on 7/4/22) who was admitted for surgical mgmt of progression of aneurysmal disease. Recent admission 3/20-4/14 during which patient underwent replacement of transverse aortic arch, second stage thoracic endovascular aortic repair, aorto-axillary bypass, AV replacement (bio 23mm), and CABG x 1 (SVG-RCA) EF 75% (Ignacio, 3/20). Post op cb lactic acidosis, severe cardiogenic and vasogenic shock, TREY requiring CVVHD and temporary dialysis requirement. Patient presented to [facility] ED 4/27 for syncope vs seizure episode at home, falling onto back of head. Nuerological workup proformed during that visit revealed a negative EEG, but given clinical picture of seizures, pt started on vimpat and depakote. Imaging preformed during that admission did no require acute surgical intervention on endoleak.   Pt presented to [facility] ED last night complaning of worsening epigastric pain. CTAP revealed continued endoleak. Sent to [facility] for further evaluation. Pt will be taken to the OR with Dr. Pierre this morning for a completion TEVAR. Pt consented, OR aware.         (05 May 2023 09:41)      Interval events: Fever 103, CXR in AM demonstrated white out of L Lung, bronchoscopy performed, thick secretions identified and mucomyst nebulizer given. 1x BM in AM    Subjective: Sedated and intubated. No meaningful interaction on subjective exam.    PAST MEDICAL & SURGICAL HISTORY:  HTN (hypertension)      Aortic dissection      CAD (coronary artery disease)      Seizure disorder      S/P aortic bifurcation bypass graft      S/P CABG x 1          MEDICATIONS  (STANDING):  acetylcysteine 20% for bronchoscopy 4 milliLiter(s) Nebulizer once  aspirin  chewable 81 milliGRAM(s) Oral daily  chlorhexidine 0.12% Liquid 15 milliLiter(s) Oral Mucosa every 12 hours  chlorhexidine 2% Cloths 1 Application(s) Topical daily  dexMEDEtomidine Infusion 0.2 MICROgram(s)/kG/Hr (3.5 mL/Hr) IV Continuous <Continuous>  fentaNYL    Injectable 50 MICROGram(s) IV Push once  lacosamide IVPB 250 milliGRAM(s) IV Intermittent every 12 hours  milrinone Infusion 0.125 MICROgram(s)/kG/Min (2.63 mL/Hr) IV Continuous <Continuous>  pantoprazole  Injectable 40 milliGRAM(s) IV Push daily  Parenteral Nutrition - Adult 1 Each (70 mL/Hr) TPN Continuous <Continuous>  piperacillin/tazobactam IVPB.. 3.375 Gram(s) IV Intermittent every 8 hours  propofol Infusion 10 MICROgram(s)/kG/Min (4.2 mL/Hr) IV Continuous <Continuous>  sodium chloride 0.9%. 1000 milliLiter(s) (10 mL/Hr) IV Continuous <Continuous>  vancomycin  IVPB 1000 milliGRAM(s) IV Intermittent every 12 hours    MEDICATIONS  (PRN):  fentaNYL    Injectable 25 MICROGram(s) IV Push every 3 hours PRN Severe Pain (7 - 10)      Allergies    No Known Allergies    Intolerances        SOCIAL HISTORY:    FAMILY HISTORY:  No pertinent family history in first degree relatives        Vital Signs Last 24 Hrs  T(C): 36.7 (20 May 2023 05:09), Max: 39.4 (19 May 2023 15:00)  T(F): 98.1 (20 May 2023 05:09), Max: 103 (19 May 2023 15:00)  HR: 123 (20 May 2023 07:00) (83 - 142)  BP: --  BP(mean): --  RR: 23 (20 May 2023 04:30) (20 - 23)  SpO2: 100% (20 May 2023 07:00) (95% - 100%)    Parameters below as of 20 May 2023 07:00  Patient On (Oxygen Delivery Method): ventilator    O2 Concentration (%): 100    PHYSICAL EXAM  Neuro: Sedated and intubated  CV: Normal rate, regular rhythm  Pulm: Intubated, equal chest rise  Abd: Mildly distended; no reaction to palpation  : No Cantrell  Skin: No rashes  Extremities: No edema.  Psychiatric: Sedated    LABS:                        10.7   16.37 )-----------( 214      ( 20 May 2023 02:30 )             31.5     05-20    138  |  104  |  61<H>  ----------------------------<  133<H>  3.8   |  22  |  1.24    Ca    9.1      20 May 2023 02:30  Phos  2.8     05-20  Mg     2.0     05-20    TPro  5.8<L>  /  Alb  2.5<L>  /  TBili  3.6<H>  /  DBili  x   /  AST  18  /  ALT  6<L>  /  AlkPhos  70  05-20    PT/INR - ( 20 May 2023 02:30 )   PT: 16.5 sec;   INR: 1.38          PTT - ( 20 May 2023 02:30 )  PTT:31.0 sec      RADIOLOGY & ADDITIONAL STUDIES:

## 2023-05-21 LAB
-  AMPICILLIN/SULBACTAM: SIGNIFICANT CHANGE UP
-  AMPICILLIN: SIGNIFICANT CHANGE UP
-  CEFAZOLIN: SIGNIFICANT CHANGE UP
-  CEFEPIME: SIGNIFICANT CHANGE UP
-  CEFTRIAXONE: SIGNIFICANT CHANGE UP
-  CIPROFLOXACIN: SIGNIFICANT CHANGE UP
-  ERTAPENEM: SIGNIFICANT CHANGE UP
-  GENTAMICIN: SIGNIFICANT CHANGE UP
-  MEROPENEM: SIGNIFICANT CHANGE UP
-  PIPERACILLIN/TAZOBACTAM: SIGNIFICANT CHANGE UP
-  TOBRAMYCIN: SIGNIFICANT CHANGE UP
-  TRIMETHOPRIM/SULFAMETHOXAZOLE: SIGNIFICANT CHANGE UP
ALBUMIN SERPL ELPH-MCNC: 2.6 G/DL — LOW (ref 3.3–5)
ALBUMIN SERPL ELPH-MCNC: 2.8 G/DL — LOW (ref 3.3–5)
ALP SERPL-CCNC: 58 U/L — SIGNIFICANT CHANGE UP (ref 40–120)
ALP SERPL-CCNC: 65 U/L — SIGNIFICANT CHANGE UP (ref 40–120)
ALT FLD-CCNC: 7 U/L — LOW (ref 10–45)
ALT FLD-CCNC: 9 U/L — LOW (ref 10–45)
ANION GAP SERPL CALC-SCNC: 12 MMOL/L — SIGNIFICANT CHANGE UP (ref 5–17)
ANION GAP SERPL CALC-SCNC: 13 MMOL/L — SIGNIFICANT CHANGE UP (ref 5–17)
ANION GAP SERPL CALC-SCNC: 14 MMOL/L — SIGNIFICANT CHANGE UP (ref 5–17)
APTT BLD: 33.6 SEC — SIGNIFICANT CHANGE UP (ref 27.5–35.5)
APTT BLD: 35.6 SEC — HIGH (ref 27.5–35.5)
APTT BLD: 35.8 SEC — HIGH (ref 27.5–35.5)
AST SERPL-CCNC: 19 U/L — SIGNIFICANT CHANGE UP (ref 10–40)
AST SERPL-CCNC: 25 U/L — SIGNIFICANT CHANGE UP (ref 10–40)
BILIRUB SERPL-MCNC: 4.2 MG/DL — HIGH (ref 0.2–1.2)
BILIRUB SERPL-MCNC: 5 MG/DL — HIGH (ref 0.2–1.2)
BUN SERPL-MCNC: 69 MG/DL — HIGH (ref 7–23)
BUN SERPL-MCNC: 71 MG/DL — HIGH (ref 7–23)
BUN SERPL-MCNC: 71 MG/DL — HIGH (ref 7–23)
CALCIUM SERPL-MCNC: 8.7 MG/DL — SIGNIFICANT CHANGE UP (ref 8.4–10.5)
CALCIUM SERPL-MCNC: 8.8 MG/DL — SIGNIFICANT CHANGE UP (ref 8.4–10.5)
CALCIUM SERPL-MCNC: 9.2 MG/DL — SIGNIFICANT CHANGE UP (ref 8.4–10.5)
CHLORIDE SERPL-SCNC: 103 MMOL/L — SIGNIFICANT CHANGE UP (ref 96–108)
CHLORIDE SERPL-SCNC: 105 MMOL/L — SIGNIFICANT CHANGE UP (ref 96–108)
CHLORIDE SERPL-SCNC: 105 MMOL/L — SIGNIFICANT CHANGE UP (ref 96–108)
CO2 SERPL-SCNC: 19 MMOL/L — LOW (ref 22–31)
CO2 SERPL-SCNC: 20 MMOL/L — LOW (ref 22–31)
CO2 SERPL-SCNC: 20 MMOL/L — LOW (ref 22–31)
CREAT SERPL-MCNC: 1.05 MG/DL — SIGNIFICANT CHANGE UP (ref 0.5–1.3)
CREAT SERPL-MCNC: 1.13 MG/DL — SIGNIFICANT CHANGE UP (ref 0.5–1.3)
CREAT SERPL-MCNC: 1.13 MG/DL — SIGNIFICANT CHANGE UP (ref 0.5–1.3)
EGFR: 77 ML/MIN/1.73M2 — SIGNIFICANT CHANGE UP
EGFR: 77 ML/MIN/1.73M2 — SIGNIFICANT CHANGE UP
EGFR: 84 ML/MIN/1.73M2 — SIGNIFICANT CHANGE UP
GAS PNL BLDA: SIGNIFICANT CHANGE UP
GLUCOSE SERPL-MCNC: 148 MG/DL — HIGH (ref 70–99)
GLUCOSE SERPL-MCNC: 148 MG/DL — HIGH (ref 70–99)
GLUCOSE SERPL-MCNC: 186 MG/DL — HIGH (ref 70–99)
GRAM STN FLD: SIGNIFICANT CHANGE UP
GRAM STN FLD: SIGNIFICANT CHANGE UP
HCT VFR BLD CALC: 32 % — LOW (ref 39–50)
HCT VFR BLD CALC: 32.9 % — LOW (ref 39–50)
HCT VFR BLD CALC: 33.5 % — LOW (ref 39–50)
HGB BLD-MCNC: 10.7 G/DL — LOW (ref 13–17)
HGB BLD-MCNC: 11 G/DL — LOW (ref 13–17)
HGB BLD-MCNC: 11.1 G/DL — LOW (ref 13–17)
INR BLD: 1.33 — HIGH (ref 0.88–1.16)
INR BLD: 1.38 — HIGH (ref 0.88–1.16)
INR BLD: 1.49 — HIGH (ref 0.88–1.16)
LACTATE SERPL-SCNC: 1.1 MMOL/L — SIGNIFICANT CHANGE UP (ref 0.5–2)
LACTATE SERPL-SCNC: 1.2 MMOL/L — SIGNIFICANT CHANGE UP (ref 0.5–2)
LACTATE SERPL-SCNC: 1.7 MMOL/L — SIGNIFICANT CHANGE UP (ref 0.5–2)
MAGNESIUM SERPL-MCNC: 2 MG/DL — SIGNIFICANT CHANGE UP (ref 1.6–2.6)
MAGNESIUM SERPL-MCNC: 2.2 MG/DL — SIGNIFICANT CHANGE UP (ref 1.6–2.6)
MAGNESIUM SERPL-MCNC: 2.3 MG/DL — SIGNIFICANT CHANGE UP (ref 1.6–2.6)
MCHC RBC-ENTMCNC: 30.3 PG — SIGNIFICANT CHANGE UP (ref 27–34)
MCHC RBC-ENTMCNC: 30.7 PG — SIGNIFICANT CHANGE UP (ref 27–34)
MCHC RBC-ENTMCNC: 30.7 PG — SIGNIFICANT CHANGE UP (ref 27–34)
MCHC RBC-ENTMCNC: 32.8 GM/DL — SIGNIFICANT CHANGE UP (ref 32–36)
MCHC RBC-ENTMCNC: 33.4 GM/DL — SIGNIFICANT CHANGE UP (ref 32–36)
MCHC RBC-ENTMCNC: 33.7 GM/DL — SIGNIFICANT CHANGE UP (ref 32–36)
MCV RBC AUTO: 90.9 FL — SIGNIFICANT CHANGE UP (ref 80–100)
MCV RBC AUTO: 92 FL — SIGNIFICANT CHANGE UP (ref 80–100)
MCV RBC AUTO: 92.3 FL — SIGNIFICANT CHANGE UP (ref 80–100)
METHOD TYPE: SIGNIFICANT CHANGE UP
NRBC # BLD: 0 /100 WBCS — SIGNIFICANT CHANGE UP (ref 0–0)
PHOSPHATE SERPL-MCNC: 3.5 MG/DL — SIGNIFICANT CHANGE UP (ref 2.5–4.5)
PHOSPHATE SERPL-MCNC: 3.7 MG/DL — SIGNIFICANT CHANGE UP (ref 2.5–4.5)
PHOSPHATE SERPL-MCNC: 4.2 MG/DL — SIGNIFICANT CHANGE UP (ref 2.5–4.5)
PLATELET # BLD AUTO: 234 K/UL — SIGNIFICANT CHANGE UP (ref 150–400)
PLATELET # BLD AUTO: 250 K/UL — SIGNIFICANT CHANGE UP (ref 150–400)
PLATELET # BLD AUTO: 253 K/UL — SIGNIFICANT CHANGE UP (ref 150–400)
POTASSIUM SERPL-MCNC: 4.1 MMOL/L — SIGNIFICANT CHANGE UP (ref 3.5–5.3)
POTASSIUM SERPL-MCNC: 4.2 MMOL/L — SIGNIFICANT CHANGE UP (ref 3.5–5.3)
POTASSIUM SERPL-MCNC: 4.3 MMOL/L — SIGNIFICANT CHANGE UP (ref 3.5–5.3)
POTASSIUM SERPL-SCNC: 4.1 MMOL/L — SIGNIFICANT CHANGE UP (ref 3.5–5.3)
POTASSIUM SERPL-SCNC: 4.2 MMOL/L — SIGNIFICANT CHANGE UP (ref 3.5–5.3)
POTASSIUM SERPL-SCNC: 4.3 MMOL/L — SIGNIFICANT CHANGE UP (ref 3.5–5.3)
PROT SERPL-MCNC: 5.6 G/DL — LOW (ref 6–8.3)
PROT SERPL-MCNC: 5.7 G/DL — LOW (ref 6–8.3)
PROTHROM AB SERPL-ACNC: 15.9 SEC — HIGH (ref 10.5–13.4)
PROTHROM AB SERPL-ACNC: 16.5 SEC — HIGH (ref 10.5–13.4)
PROTHROM AB SERPL-ACNC: 17.8 SEC — HIGH (ref 10.5–13.4)
RBC # BLD: 3.48 M/UL — LOW (ref 4.2–5.8)
RBC # BLD: 3.62 M/UL — LOW (ref 4.2–5.8)
RBC # BLD: 3.63 M/UL — LOW (ref 4.2–5.8)
RBC # FLD: 16 % — HIGH (ref 10.3–14.5)
SODIUM SERPL-SCNC: 136 MMOL/L — SIGNIFICANT CHANGE UP (ref 135–145)
SODIUM SERPL-SCNC: 136 MMOL/L — SIGNIFICANT CHANGE UP (ref 135–145)
SODIUM SERPL-SCNC: 139 MMOL/L — SIGNIFICANT CHANGE UP (ref 135–145)
SPECIMEN SOURCE: SIGNIFICANT CHANGE UP
SPECIMEN SOURCE: SIGNIFICANT CHANGE UP
WBC # BLD: 19.22 K/UL — HIGH (ref 3.8–10.5)
WBC # BLD: 27.35 K/UL — HIGH (ref 3.8–10.5)
WBC # BLD: 28.5 K/UL — HIGH (ref 3.8–10.5)
WBC # FLD AUTO: 19.22 K/UL — HIGH (ref 3.8–10.5)
WBC # FLD AUTO: 27.35 K/UL — HIGH (ref 3.8–10.5)
WBC # FLD AUTO: 28.5 K/UL — HIGH (ref 3.8–10.5)

## 2023-05-21 PROCEDURE — 99292 CRITICAL CARE ADDL 30 MIN: CPT

## 2023-05-21 RX ORDER — DIGOXIN 250 MCG
500 TABLET ORAL ONCE
Refills: 0 | Status: COMPLETED | OUTPATIENT
Start: 2023-05-21 | End: 2023-05-21

## 2023-05-21 RX ORDER — ESMOLOL HCL 100MG/10ML
25 VIAL (ML) INTRAVENOUS
Qty: 2500 | Refills: 0 | Status: DISCONTINUED | OUTPATIENT
Start: 2023-05-21 | End: 2023-05-23

## 2023-05-21 RX ORDER — AMPICILLIN TRIHYDRATE 250 MG
2 CAPSULE ORAL EVERY 6 HOURS
Refills: 0 | Status: DISCONTINUED | OUTPATIENT
Start: 2023-05-21 | End: 2023-05-27

## 2023-05-21 RX ORDER — I.V. FAT EMULSION 20 G/100ML
0.71 EMULSION INTRAVENOUS
Qty: 50 | Refills: 0 | Status: DISCONTINUED | OUTPATIENT
Start: 2023-05-21 | End: 2023-05-21

## 2023-05-21 RX ORDER — ALBUMIN HUMAN 25 %
250 VIAL (ML) INTRAVENOUS
Refills: 0 | Status: COMPLETED | OUTPATIENT
Start: 2023-05-21 | End: 2023-05-21

## 2023-05-21 RX ORDER — HEPARIN SODIUM 5000 [USP'U]/ML
5000 INJECTION INTRAVENOUS; SUBCUTANEOUS EVERY 8 HOURS
Refills: 0 | Status: DISCONTINUED | OUTPATIENT
Start: 2023-05-21 | End: 2023-05-30

## 2023-05-21 RX ORDER — MEROPENEM 1 G/30ML
1000 INJECTION INTRAVENOUS EVERY 8 HOURS
Refills: 0 | Status: DISCONTINUED | OUTPATIENT
Start: 2023-05-21 | End: 2023-05-26

## 2023-05-21 RX ORDER — ELECTROLYTE SOLUTION,INJ
1 VIAL (ML) INTRAVENOUS
Refills: 0 | Status: DISCONTINUED | OUTPATIENT
Start: 2023-05-21 | End: 2023-05-21

## 2023-05-21 RX ORDER — METOPROLOL TARTRATE 50 MG
5 TABLET ORAL ONCE
Refills: 0 | Status: COMPLETED | OUTPATIENT
Start: 2023-05-21 | End: 2023-05-21

## 2023-05-21 RX ORDER — FUROSEMIDE 40 MG
40 TABLET ORAL ONCE
Refills: 0 | Status: COMPLETED | OUTPATIENT
Start: 2023-05-21 | End: 2023-05-21

## 2023-05-21 RX ORDER — METOPROLOL TARTRATE 50 MG
5 TABLET ORAL EVERY 6 HOURS
Refills: 0 | Status: DISCONTINUED | OUTPATIENT
Start: 2023-05-21 | End: 2023-05-21

## 2023-05-21 RX ADMIN — PIPERACILLIN AND TAZOBACTAM 25 GRAM(S): 4; .5 INJECTION, POWDER, LYOPHILIZED, FOR SOLUTION INTRAVENOUS at 06:48

## 2023-05-21 RX ADMIN — MEROPENEM 100 MILLIGRAM(S): 1 INJECTION INTRAVENOUS at 21:41

## 2023-05-21 RX ADMIN — Medication 1 EACH: at 17:07

## 2023-05-21 RX ADMIN — LACOSAMIDE 150 MILLIGRAM(S): 50 TABLET ORAL at 11:51

## 2023-05-21 RX ADMIN — Medication 40 MILLIGRAM(S): at 22:25

## 2023-05-21 RX ADMIN — CHLORHEXIDINE GLUCONATE 1 APPLICATION(S): 213 SOLUTION TOPICAL at 06:00

## 2023-05-21 RX ADMIN — FENTANYL CITRATE 25 MICROGRAM(S): 50 INJECTION INTRAVENOUS at 20:50

## 2023-05-21 RX ADMIN — PANTOPRAZOLE SODIUM 40 MILLIGRAM(S): 20 TABLET, DELAYED RELEASE ORAL at 12:10

## 2023-05-21 RX ADMIN — I.V. FAT EMULSION 20.8 GM/KG/DAY: 20 EMULSION INTRAVENOUS at 17:07

## 2023-05-21 RX ADMIN — Medication 5 MILLIGRAM(S): at 07:45

## 2023-05-21 RX ADMIN — MEROPENEM 100 MILLIGRAM(S): 1 INJECTION INTRAVENOUS at 11:51

## 2023-05-21 RX ADMIN — FENTANYL CITRATE 25 MICROGRAM(S): 50 INJECTION INTRAVENOUS at 20:26

## 2023-05-21 RX ADMIN — Medication 500 MICROGRAM(S): at 09:45

## 2023-05-21 RX ADMIN — CHLORHEXIDINE GLUCONATE 15 MILLILITER(S): 213 SOLUTION TOPICAL at 06:48

## 2023-05-21 RX ADMIN — Medication 216 GRAM(S): at 18:24

## 2023-05-21 RX ADMIN — Medication 125 MILLILITER(S): at 02:09

## 2023-05-21 RX ADMIN — LACOSAMIDE 150 MILLIGRAM(S): 50 TABLET ORAL at 23:19

## 2023-05-21 RX ADMIN — PROPOFOL 4.2 MICROGRAM(S)/KG/MIN: 10 INJECTION, EMULSION INTRAVENOUS at 16:43

## 2023-05-21 RX ADMIN — HEPARIN SODIUM 5000 UNIT(S): 5000 INJECTION INTRAVENOUS; SUBCUTANEOUS at 21:41

## 2023-05-21 RX ADMIN — PROPOFOL 4.2 MICROGRAM(S)/KG/MIN: 10 INJECTION, EMULSION INTRAVENOUS at 12:16

## 2023-05-21 RX ADMIN — Medication 10.5 MICROGRAM(S)/KG/MIN: at 14:28

## 2023-05-21 RX ADMIN — Medication 5 MILLIGRAM(S): at 11:47

## 2023-05-21 RX ADMIN — Medication 125 MILLILITER(S): at 01:09

## 2023-05-21 RX ADMIN — CHLORHEXIDINE GLUCONATE 15 MILLILITER(S): 213 SOLUTION TOPICAL at 18:44

## 2023-05-21 NOTE — PROGRESS NOTE ADULT - SUBJECTIVE AND OBJECTIVE BOX
Subjective: Sedated and intubated. No meaningful interaction on subjective exam.        Vital Signs Last 24 Hrs  T(C): 36.2 (21 May 2023 05:50), Max: 36.7 (20 May 2023 09:21)  T(F): 97.1 (21 May 2023 05:50), Max: 98.1 (20 May 2023 09:21)  HR: 130 (21 May 2023 06:00) (89 - 146)  BP: --  BP(mean): --  RR: 28 (21 May 2023 06:00) (18 - 34)  SpO2: 99% (21 May 2023 06:00) (97% - 100%)    Parameters below as of 21 May 2023 06:00  Patient On (Oxygen Delivery Method): ventilator    O2 Concentration (%): 20    I&O's Summary    19 May 2023 07:01  -  20 May 2023 07:00  --------------------------------------------------------  IN: 3460 mL / OUT: 3730 mL / NET: -270 mL    20 May 2023 07:01  -  21 May 2023 06:53  --------------------------------------------------------  IN: 4090.7 mL / OUT: 2885 mL / NET: 1205.7 mL        PHYSICAL EXAM  Neuro: Sedated and intubated  CV: Normal rate, regular rhythm  Pulm: Intubated, equal chest rise  Abd: Mildly distended; no reaction to palpation  : No Cantrell  Skin: No rashes  Extremities: No edema.  Psychiatric: Sedated        LABS:                          11.0   19.22 )-----------( 234      ( 21 May 2023 02:26 )             33.5     05-21    136  |  105  |  69<H>  ----------------------------<  186<H>  4.1   |  19<L>  |  1.13    Ca    8.8      21 May 2023 02:26  Phos  3.5     05-21  Mg     2.0     05-21    TPro  5.6<L>  /  Alb  2.8<L>  /  TBili  4.2<H>  /  DBili  x   /  AST  19  /  ALT  7<L>  /  AlkPhos  58  05-21    LIVER FUNCTIONS - ( 21 May 2023 02:26 )  Alb: 2.8 g/dL / Pro: 5.6 g/dL / ALK PHOS: 58 U/L / ALT: 7 U/L / AST: 19 U/L / GGT: x           PT/INR - ( 21 May 2023 02:26 )   PT: 17.8 sec;   INR: 1.49          PTT - ( 21 May 2023 02:26 )  PTT:35.6 sec        RADIOLOGY & ADDITIONAL STUDIES:

## 2023-05-21 NOTE — PROGRESS NOTE ADULT - ASSESSMENT
This is a 52yo Male pt with PMH HTN, type A aortic dissection s/p Dacron grafts, AV resuspension in 2013, CAD s/p CABG x 1 SVG to RCA (5/2013 with Dr. Medina), seizure disorder (last episode on 7/4/22) S/P replacement of transverse aortic arch, second stage thoracic endovascular aortic repair, aorto-axillary bypass, AV replacement (bio 23mm), in APr 2023 and CABG x 1 (SVG-RCA) EF 75% (Ignacio, 3/20) now s/p 2nd state TEVAR on 5/5 for whom surgery was consulted for finding of acute pancreatitis with large peripancreatic/perigastric fluid collections on CTA abdomen 5/8 then acute hemorrhage starting 5/12/23 requiring 11 U pRBC over last 48 hours but with negative mesenteric angiogram 5/13/23 for whom hepatobiliary surgery was reconsulted for possible hemorrhagic pancreatitis.    Recommendations  - If performing thoracentesis, please send fluid for amylase  - Would keep NPO with TPN support given high bilious NGT output.  - Trend H&H regularly.   - Appreciate excellent care per primary team. Hepatobiliary surgery continuing to follow.   This is a 52yo Male pt with PMH HTN, type A aortic dissection s/p Dacron grafts, AV resuspension in 2013, CAD s/p CABG x 1 SVG to RCA (5/2013 with Dr. Medina), seizure disorder (last episode on 7/4/22) S/P replacement of transverse aortic arch, second stage thoracic endovascular aortic repair, aorto-axillary bypass, AV replacement (bio 23mm), in APr 2023 and CABG x 1 (SVG-RCA) EF 75% (Ignacio, 3/20) now s/p 2nd state TEVAR on 5/5 for whom surgery was consulted for finding of acute pancreatitis with large peripancreatic/perigastric fluid collections on CTA abdomen 5/8 then acute hemorrhage starting 5/12/23 requiring 11 U pRBC over last 48 hours but with negative mesenteric angiogram 5/13/23 for whom hepatobiliary surgery was reconsulted for possible hemorrhagic pancreatitis.    Recommendations  - Would keep NPO with TPN support given high bilious NGT output.  - Trend H&H regularly.   - Appreciate excellent care per primary team. Hepatobiliary surgery continuing to follow.

## 2023-05-21 NOTE — PROCEDURE NOTE - NSBRONCHPROCDETAILS_GEN_A_CORE_FT
CTICU  CRITICAL  CARE  PROCEDURE  NOTE      				     Name of procedure – Flexible fiberoptic bronchoscopy    Indication –   Left chest opacity     Flexible fiberoptic bronchoscopy was performed under propofol sedation while the patient was intubated for controlled mechanical ventilation  Pre-procedural assessment reveals no risk of Tb  Ventilator settings were adjusted to reduce airway pressures to reduce the risk of ptx  The scope was advanced past the ett which was noted to be 2 cm the leeanne   The leeanne was sharp  RUL/RML/RLL patent with minimal secretions.   MARCO/LLL airway  with copious purulent secretions, lavaged, and sent for culture  Lavaged and sent for culture  CXR confirmed no pneumothorax post bronch  There were no complications  The patient tolerated the procedure well   CTICU  CRITICAL  CARE  PROCEDURE  NOTE      				     Name of procedure – Flexible fiberoptic bronchoscopy    Indication –   Left chest opacity     Flexible fiberoptic bronchoscopy was performed under propofol sedation while the patient was intubated for controlled mechanical ventilation  Pre-procedural assessment reveals no risk of Tb  Ventilator settings were adjusted to reduce airway pressures to reduce the risk of ptx  The scope was advanced past the ett which was noted to be 2 cm the leeanne   The leeanne was sharp  RUL/RML/RLL patent with minimal secretions.   MARCO/LLL airway with copious purulent secretions, lavaged, and sent for culture  CXR confirmed no pneumothorax post bronch  There were no complications  The patient tolerated the procedure well

## 2023-05-21 NOTE — PROGRESS NOTE ADULT - SUBJECTIVE AND OBJECTIVE BOX
CTICU  CRITICAL  CARE  attending     Hand off received 					   Pertinent clinical, laboratory, radiographic, hemodynamic, echocardiographic, respiratory data, microbiologic data and chart were reviewed and analyzed frequently throughout the course of the day and night  Patient seen and examined with CTS/ SH attending at bedside    Pt is a 53y , Male, post op day # 16 s/p TEVAR    post op c/b    Sepsis  necrotizing pancreatitis  acute hypoxemic resp failure    currently:    on full Vent support  on TPN  Armaan @ 10 ppm  Ascites  BAL with E fecalis; entero bacter; klebsiella  Bed side bronch with thick secretions  Ampicillin added to Meropenem by ID  f/u by surgery  Esmolol infusion for sinus tach ( 's)    52 M w/ PMHx of HTN, type A aortic dissection s/p Dacron grafts, AV resuspension in 2013, CAD s/p CABG x 1 SVG to RCA (5/2013 with Dr. Medina), seizure disorder who was admitted for surgical mgmt of progression of aneurysmal disease. Recent admission 3/20-4/14 during which patient underwent replacement of transverse aortic arch, second stage TEVAR and aorto-axillary bypass, AV replacement (bio 23mm), and CABG x 1 (SVG-RCA) EF 75% ([facility], 3/20). Post op cb severe cardiogenic and vasogenic shock, TREY requiring CVVHD and temporary dialysis. Camr off RRT and discharged home mid April.presented to [facility] ED 4/27 for syncope vs seizure episode at home. negative neuro work up for Seizures AED dose adjusted however imaging at that rime revealed recent graft endoleak, Admitted now for surgical mgmt.   S/p Second stage thoracic endovascular aortic repair   Post op course complicated by transient LE weakness, Improved   LD clamped and Dc's 5/8  worsening leukocytosis- CT C/A/P suggestive of necrotizing pancreatitis/ Lipase on admission > 1000 down to 95 5/8               , FAMILY HISTORY:  No pertinent family history in first degree relatives    PAST MEDICAL & SURGICAL HISTORY:  HTN (hypertension)      Aortic dissection      CAD (coronary artery disease)      Seizure disorder      S/P aortic bifurcation bypass graft      S/P CABG x 1        Patient is a 53y old  Male who presents with a chief complaint of endoleak, abdominal pain (21 May 2023 06:52)      14 system review unable to assess  acute changes include acute respiratory failure  Vital signs, hemodynamic and respiratory parameters were reviewed from the bedside nursing flowsheet.  ICU Vital Signs Last 24 Hrs  T(C): 36 (21 May 2023 13:33), Max: 36.5 (21 May 2023 01:25)  T(F): 96.8 (21 May 2023 13:33), Max: 97.7 (21 May 2023 01:25)  HR: 106 (21 May 2023 17:05) (105 - 146)  BP: 139/85 (21 May 2023 11:00) (139/85 - 139/85)  BP(mean): 106 (21 May 2023 11:00) (106 - 106)  ABP: 125/74 (21 May 2023 17:00) (90/56 - 161/93)  ABP(mean): 93 (21 May 2023 17:00) (64 - 121)  RR: 25 (21 May 2023 17:05) (22 - 34)  SpO2: 100% (21 May 2023 17:05) (97% - 100%)    O2 Parameters below as of 21 May 2023 17:05  Patient On (Oxygen Delivery Method): ventilator    O2 Concentration (%): 50      Adult Advanced Hemodynamics Last 24 Hrs  CVP(mm Hg): 13 (21 May 2023 17:00) (9 - 26)  CVP(cm H2O): --  CO: --  CI: --  PA: --  PA(mean): --  PCWP: --  SVR: --  SVRI: --  PVR: --  PVRI: --, ABG - ( 21 May 2023 13:52 )  pH, Arterial: 7.42  pH, Blood: x     /  pCO2: 31    /  pO2: 133   / HCO3: 20    / Base Excess: -3.4  /  SaO2: 99.8              Mode: AC/ CMV (Assist Control/ Continuous Mandatory Ventilation)  RR (machine): 20  TV (machine): 500  FiO2: 50  PEEP: 8  ITime: 1  MAP: 15  PIP: 29    Intake and output was reviewed and the fluid balance was calculated  Daily     Daily   I&O's Summary    20 May 2023 07:01  -  21 May 2023 07:00  --------------------------------------------------------  IN: 4372.5 mL / OUT: 3055 mL / NET: 1317.5 mL    21 May 2023 07:01  -  21 May 2023 17:39  --------------------------------------------------------  IN: 1257.8 mL / OUT: 620 mL / NET: 637.8 mL        All lines and drain sites were assessed  Glycemic trend was reviewedCAPILLARY BLOOD GLUCOSE        No acute change in mental status  (+) Orotracheally intubated  Auscultation of the chest reveals equal bs  Abdomen is soft  Extremities are warm and well perfused  Wounds appear clean and unremarkable  Antibiotics are periop    labs  CBC Full  -  ( 21 May 2023 13:56 )  WBC Count : 27.35 K/uL  RBC Count : 3.62 M/uL  Hemoglobin : 11.1 g/dL  Hematocrit : 32.9 %  Platelet Count - Automated : 253 K/uL  Mean Cell Volume : 90.9 fl  Mean Cell Hemoglobin : 30.7 pg  Mean Cell Hemoglobin Concentration : 33.7 gm/dL  Auto Neutrophil # : x  Auto Lymphocyte # : x  Auto Monocyte # : x  Auto Eosinophil # : x  Auto Basophil # : x  Auto Neutrophil % : x  Auto Lymphocyte % : x  Auto Monocyte % : x  Auto Eosinophil % : x  Auto Basophil % : x    05-21    139  |  105  |  71<H>  ----------------------------<  148<H>  4.3   |  20<L>  |  1.05    Ca    9.2      21 May 2023 13:56  Phos  3.7     05-21  Mg     2.2     05-21    TPro  5.6<L>  /  Alb  2.8<L>  /  TBili  4.2<H>  /  DBili  x   /  AST  19  /  ALT  7<L>  /  AlkPhos  58  05-21    PT/INR - ( 21 May 2023 13:56 )   PT: 16.5 sec;   INR: 1.38          PTT - ( 21 May 2023 13:56 )  PTT:35.8 sec  The current medications were reviewed   MEDICATIONS  (STANDING):  ampicillin  IVPB 2 Gram(s) IV Intermittent every 6 hours  aspirin  chewable 81 milliGRAM(s) Oral daily  chlorhexidine 0.12% Liquid 15 milliLiter(s) Oral Mucosa every 12 hours  chlorhexidine 2% Cloths 1 Application(s) Topical daily  dexMEDEtomidine Infusion 0.2 MICROgram(s)/kG/Hr (3.5 mL/Hr) IV Continuous <Continuous>  esmolol  Infusion 25 MICROgram(s)/kG/Min (10.5 mL/Hr) IV Continuous <Continuous>  fat emulsion (Fish Oil and Plant Based) 20% Infusion 0.7143 Gm/kG/Day (20.8 mL/Hr) IV Continuous <Continuous>  lacosamide IVPB 250 milliGRAM(s) IV Intermittent every 12 hours  meropenem  IVPB 1000 milliGRAM(s) IV Intermittent every 8 hours  pantoprazole  Injectable 40 milliGRAM(s) IV Push daily  Parenteral Nutrition - Adult 1 Each (70 mL/Hr) TPN Continuous <Continuous>  Parenteral Nutrition - Adult 1 Each (70 mL/Hr) TPN Continuous <Continuous>  propofol Infusion 10 MICROgram(s)/kG/Min (4.2 mL/Hr) IV Continuous <Continuous>  sodium chloride 0.9%. 1000 milliLiter(s) (10 mL/Hr) IV Continuous <Continuous>  vasopressin Infusion 0.02 Unit(s)/Min (3 mL/Hr) IV Continuous <Continuous>    MEDICATIONS  (PRN):  fentaNYL    Injectable 25 MICROGram(s) IV Push every 3 hours PRN Severe Pain (7 - 10)       PROBLEM LIST/ ASSESSMENT:  HEALTH ISSUES - PROBLEM Dx:      ,   Patient is a 53y old  Male who presents with a chief complaint of endoleak, abdominal pain (21 May 2023 06:52)     s/p TEVAR  acute changes include acute respiratory failure    My plan includes :    Continue full Vent support  Titrate Fio2 to maintain Sao2 >95%  Toilet bedside bronchoscopy as needed  continue Abx per ID recs  Consider diagnostic paracentesis for cell count/culture/amylase/lipase  continue TPN; maintain NPO  continue esmolol infusion; titrate for heart rate <100    close hemodynamic, ventilatory and drain monitoring and management per post op routine    Monitor for arrhythmias and monitor parameters for organ perfusion  monitor neurologic status  Head of the bed should remain elevated to 45 deg .   chest PT and IS will be encouraged  monitor adequacy of oxygenation and ventilation and attempt to wean oxygen  Nutritional goals will be met using po eventually , ensure adequate caloric intake and montior the same  Stress ulcer and VTE prophylaxis will be achieved    Glycemic control is satisfactory  Electrolytes have been repleted as necessary and wound care has been carried out. Pain control has been achieved.   agressive physical therapy and early mobility and ambulation goals will be met   The family was updated about the course and plan  CRITICAL CARE TIME SPENT in evaluation and management, reassessments, review and interpretation of labs and x-rays, ventilator and hemodynamic management, formulating a plan and coordinating care: ___90____ MIN.  Time does not include procedural time.  CTICU ATTENDING     					    Dalton Martinez MD

## 2023-05-21 NOTE — PROGRESS NOTE ADULT - ASSESSMENT
52 yo male hx seizure disorder, aortic dissection s/p AVR, aortic graft revision 3/20/2023, second stage TEVAR 5/5/2023, course c/b peripancreatic/RP hemorrhage/hematoma formation. Klebsiella BSI i/s/o VAP and pancreatic necrosis/RP hemorrhage (adjacent to aortic vascular graft). f/u surveillance bcx 5/18, blood cultures X 2 from 5/19, sputum culture 5/19, bronch culture from today; given sz disorder, changed cefepime to Zosyn 5/18. Sputum culture from 5/19 with Klebsiella pneumoniae, ESBL-producing Enterobacter cloacae (S shin, I erta), E. faecalis and Strep anginosus.  Would d/c pip-tazo, start meropenem 1 g IV q8h and ampicillin 2 g IV q6h.  Agree with Surgery - would sample peritoneal fluid for cell count with diff, culture.  Consider PET CT given proximity of infected necrosis to vascular graft to assess for vascular graft infection.  Discussed with Dr. Martinez.  Dr. Larkin will resume care tomorrow.

## 2023-05-21 NOTE — PROGRESS NOTE ADULT - SUBJECTIVE AND OBJECTIVE BOX
INTERVAL HPI/OVERNIGHT EVENTS:  Coverage for Dr. Larkin.  Afebrile.    ROS:  Unobtainable - sedated on vent      ANTIBIOTICS/RELEVANT:          Vital Signs Last 24 Hrs  T(C): 35.7 (21 May 2023 18:05), Max: 36.5 (21 May 2023 01:25)  T(F): 96.3 (21 May 2023 18:05), Max: 97.7 (21 May 2023 01:25)  HR: 110 (21 May 2023 20:00) (105 - 146)  BP: 139/85 (21 May 2023 11:00) (139/85 - 139/85)  BP(mean): 106 (21 May 2023 11:00) (106 - 106)  RR: 28 (21 May 2023 20:00) (24 - 32)  SpO2: 100% (21 May 2023 20:00) (99% - 100%)    Parameters below as of 21 May 2023 20:00  Patient On (Oxygen Delivery Method): ventilator    O2 Concentration (%): 50    PHYSICAL EXAM:  Constitutional:  Intubated, sedated  Eyes:  Sclerae anicteric, conjunctivae clear, PERRL  Ear/Nose/Throat:  Bilious drainage in NGT;  ETT	  Neck:  Supple, no adenopathy  Respiratory:  Clear bilaterally  Cardiovascular:  RRR, S1S2, no murmur appreciated  Gastrointestinal:  Normoactive BS, distended, no guarding or rebound.  No HSM  Extremities:  No edema      LABS:                        11.1   27.35 )-----------( 253      ( 21 May 2023 13:56 )             32.9         05-21    139  |  105  |  71<H>  ----------------------------<  148<H>  4.3   |  20<L>  |  1.05    Ca    9.2      21 May 2023 13:56  Phos  3.7     05-21  Mg     2.2     05-21    TPro  5.6<L>  /  Alb  2.8<L>  /  TBili  4.2<H>  /  DBili  x   /  AST  19  /  ALT  7<L>  /  AlkPhos  58  05-21          MICROBIOLOGY:        RADIOLOGY & ADDITIONAL STUDIES:

## 2023-05-21 NOTE — EEG REPORT - NS EEG TEXT BOX
[facility] Department of Neurology  Inpatient Continuous video-Electroencephalogram    Patient Name:	VINCENT MARIE    :	1970  MRN:	[id number]    Study Start Date/Time:  2023, 6:44:25 PM  Study End Date/Time:        Brief Clinical History:  VINCENT MARIE is a 53 year old Male; study performed to investigate for seizures or markers of epilepsy.    Diagnosis Code:   R56.9 convulsions/seizure  The live video was: unmonitored.    Pertinent Medications:  LCM 250mg BID, propofol drip    Acquisition Details:  Electroencephalography was acquired using a minimum of 21 channels on an [employer] system v 9.3.1 with electrode placement according to the standard International 10-20 system following ACNS (American Clinical Neurophysiology Society) guidelines for Long-Term Video EEG monitoring.  Anterior temporal T1 and T2 electrodes were utilized whenever possible.   The [employer] automated spike & seizure detections were all reviewed in detail, in addition to extensive portions of raw EEG.      Day 1: 2023 @ 6:44:25 PM to next morning @ 07:00 am  Background:  discontinuous (10-49% suppressed), with predominantly delta>theta frequencies.  Symmetry:  No persistent asymmetries of voltage or frequency.  Posterior Dominant Rhythm:  No clear PDR symmetric, well-organized, and well-modulated.  Organization: Absent.  Voltage:  Low (most <20uV LBP Peak-Trough)  Variability: Yes. 		Reactivity: No.  N2 sleep: Absent.  Spontaneous Activity:  No epileptiform discharges.  Periodic/rhythmic activity:  None  Events:  No electrographic seizures or significant clinical events.  Provocations:  Hyperventilation and Photic stimulation: was not performed.    Daily Summary:    Continuous Marked generalized   slowing suggestive of a Marked degree of diffuse or multifocal  dysfunction.      Dary Flores MD  Attending Neurologist, [facility] Epilepsy Program

## 2023-05-21 NOTE — PROGRESS NOTE ADULT - SUBJECTIVE AND OBJECTIVE BOX
INTERVAL HPI/OVERNIGHT EVENTS:    Cefepime/Vanco 5/17 - Zosyn/Vanco 5/18-21 now Meropenem/Ampicillin    3/20: AVR/arch replacement/CABG x 1/2nd stage TEVAR, aorto axillary bypass  EF 75%    54yo Male current every day marijuana use with a past medical hx of HTN, type A aortic dissection s/p Dacron grafts, AV resuspension in 2013,CAD s/p CABG x 1 SVG to RCA (5/2013 with Dr. Medina), Seizure disorder (last 7/4/22) presents for a follow up visit for evaluation and management of his aortic pathology. Patient is referred by Dr. Jacqueline Gonsales. Screen failed for Triomphe study     CTa C/A/P 11/30/22 - Status post graft repair of the ascending aorta and portion of aortic arch.  Dissecting aneurysm of the descending thoracic aorta (measuring up to 5.7 cm) and abdominal aorta (3.5 cm infrarenal), similar to the MR angiogram of 9/19/2022. Nonocclusive dissection flap present within the innominate artery, aneurysmal right subclavian artery and proximal right common carotid artery as well as aneurysmal left common iliac artery.    Cath (3/9/23): SVG-RCA patent, remaining vessels luminal irregularities. ACCESS: L radial w/ TR band for hemostasis.     to OR 3/20: intraop - 5L Crystalloid/7 units PRBC, 6unit FFP, 3unit platelet, 15unitc cryo, 1000cc FEIBA  arrived to ICU on Epi/Levo  postop - severe acidosis and Renal failure - lasix infusion started; bicarb given   EEG post-op and CT Head for poor mentation    CT Head 3/23: no acute intracranial abnormality   post-op Atrial fib - amiodarone & hypoxemia - bronch for pulm toilet  D10 started for noted hypoglycemia   CVVHD/Aquapharesis and later HD as needed - serial sessions for fluid removal to optimize before extubation     Extubated 2/29 to [facility] -    ultimately discharged -     Presented to [facility] 4/27 for syncope v seizure episode at home   Code stroke called - Imaging obtained unremarkable for cranial pathology - concern for endoleak - Esmolol infusion started and transferred to [facility] 4/27 - pancreatitis reported on imaging   of note - just seen by his neurologist and inc medication dosage recommended based on their exam 4/26  Neuro/Epilepsy consulted - EEG (-); CT spine to assess RUE weakness - negative  mental status clear - no witnessed seizures or change in mental status noted   patient discharged    patient returned 5/5 for planned TEVAR - lumbar drain placement   5/6 - Extubated - possible LE weakness - inc MAP/transfusion support and ongoing lumbar drainage with improvement    LD clamped and patient ambulated     5/8: repeat CTa C/A/P: Diffuse pancreatic enlargement and heterogenous attenuation   predominantly in body and tail, with multiple intrapancreatic and peripancreatic fluid collections with well-defined enhancing wall, new since November 30, 2022, not significantly changed since May 5, 2023 upon directed review. Largest fluid collection along stomach greater curvature   8.5 x 2 x 2 cm, communicates with inferior fluid collection measuring 5 x 3 x 3 cm.  Main pancreatic duct at head/uncinate appears mildly dilated up to 5 mm and is poorly visualized at pancreatic neck body and tail. Right groin subcutaneous tissues most compatible with old hematoma or seroma 4 x 3 cm    5/9: right pigtail placement (700 cc) - apical PTX  (+)delirium reported    5/11 - extreme abd pain reported   urinary retention - coulter replaced - 600cc out  CT Head - no acute intracranial abnormality  CT C/A/P (noncontrast): dec Rt pleural effusion s/p chest tube placement, pigtail coiled in major fissure. Unchanged mod left pleural effusion with unchanged adjacent atelectasis/consolidation.  Limited pancreas evaluation. Unchanged marked fullness pancreatic body/tail with heterogenous attenuation. Stable or slightly decreased extrapancreatic/perigastric fluid collections on this suboptimal noncontrast study. Abbreviated differential includes pseudocyst, walled off necrosis abscess. Recommend follow-up contrast-enhanced study to resolution. Unchanged small moderate abdominopelvic ascites. Unchanged caliber aneurysmal thoracic and abdominal aorta status post endovascular repair.    5/12: drop in Hg 6.6 (+)hemorrhagic shock     repeat CT 5/12:  Interval worsening of the thickening of the pancreatic body and tail with hyperdense components suggesting hemorrhage. Additional cystic changes in the pancreatic head are not well evaluated on this study. Underlying pathology such as inflammation or neoplasm cannot be excluded based on these non contrast images.    intubated and taken to IR for embolization - 4 U pRBC given ; general surgery following - no arterial bleeding noted - no intervention    intercurrent seizure - epilepsy team contacted and meds adjusted ; EEG placed and ongoing     ongoing transfusion support requirement  required additional transfusion support 5/13-5/15 - 11 U pRBC/3 FFP/2 Cryo/2 plt   Tito titrated off 5/15    attempted sedation hold to assess neuro status -   serial counts have been stable and no transfusion given for 24 hours - cont to monitor  TPN restarted 5/15    in light of pancreatic risk and bleeding risk - titrating valproate down to off with guidance from epilepsy medication off as of 5/18 - no seizure on EEG (ongoing)  ongoing TPN     concern for clinical picture of SIRS/early sepsis 5/17 - prompting imaging and initiation of presumptive Abx -     Bronch/sputum 5/17: Klebsiella pneumoniae/Proteus mirabilis  Blood Cultures 5/17: Klebsiella pneumoniae  UCx (-)  repeat blood cx 5/18 (-)to date   ID consultation called - Abx transitioned to Zosyn - and since adjusted again based on culture data and  susceptibilites - meropenem/ampicillin    CTa C/A/P 5/18: multiloculated necrosis of the distal pancreas   contiguous with the low described retroperitoneal hematoma. No   pseudoaneurysm or active bleeding. Several small pseudocysts or necrotic   collections at the head of the largest 1.2 cm. Left retroperitoneal hematoma mildly   decreased in size now 16 x 10 cm, prior 20 x 11 cm. ET tube above the leeanne. Increased from prior diffuse bilateral groundglass and branching nodular opacities, possibly   combination of pulmonary edema and multifocal pneumonia    line changed 5/18 and port preserved for ongoing TPN; ETT upsized   placed on Nimbex/fentanyl/versed infusions; Armaan added   valproate titrated off per Epilepsy consultant recommendations 5/18 and vimpat increased     now off valproate with no seizures evident on EEG  remains NPO and on TPN with ongoing biliious output from NGT    primacor titrated off 5/20 and lopressor dosings given with improvement in tachycardia   now on esmolol infusion     worsening leukocytosis - bronch performed again and repeat cultures sent   ID consultant adjusted abx again based on culture data       ICU Vital Signs Last 24 Hrs  T(C): 35.7 (21 May 2023 18:05), Max: 36.5 (21 May 2023 01:25)  T(F): 96.3 (21 May 2023 18:05), Max: 97.7 (21 May 2023 01:25)  HR: 111 (21 May 2023 19:00) (105 - 146) sinus tach   BP: 139/85 (21 May 2023 11:00) (139/85 - 139/85)  BP(mean): 106 (21 May 2023 11:00) (106 - 106)  ABP: 117/69 (21 May 2023 19:00) (90/56 - 149/85)  ABP(mean): 86 (21 May 2023 19:00) (64 - 107)  RR: 27 (21 May 2023 19:00) (24 - 34)  SpO2: 100% (21 May 2023 19:00) (99% - 100%) Fi02 50%    Qtts:   Propofol   Esmolol 50    I&O's Summary    20 May 2023 07:01  -  21 May 2023 07:00  --------------------------------------------------------  IN: 4372.5 mL / OUT: 3055 mL / NET: 1317.5 mL    21 May 2023 07:01  -  21 May 2023 19:40  --------------------------------------------------------  IN: 1593.4 mL / OUT: 850 mL / NET: 743.4 mL    Mode: AC/ CMV (Assist Control/ Continuous Mandatory Ventilation)  RR (machine): 20  TV (machine): 500  FiO2: 50  PEEP: 8  ITime: 1  MAP: 15  PIP: 29      Physical Exam    Heart - regular/tachy (-)rub/gallop  Lungs - BS appreciated bilaterally - dec BS bases Rt worse than left - no wheeze   Abd - (+)BS soft - distended -  NTND (-)r/r/g  Ext - diffuse edema - no cyanosis/clubbing  Neuro - sedated . unable at this time ; reportedly tracking with eyes when off sedation but not reliably following commands   Skin - no rash       LABS:                        11.1   27.35 )-----------( 253      ( 21 May 2023 13:56 )             32.9     05-21    139  |  105  |  71<H>  ----------------------------<  148<H>  4.3   |  20<L>  |  1.05    Ca    9.2      21 May 2023 13:56  Phos  3.7     05-21  Mg     2.2     05-21    TPro  5.6<L>  /  Alb  2.8<L>  /  TBili  4.2<H>  /  DBili  x   /  AST  19  /  ALT  7<L>  /  AlkPhos  58  05-21    PT/INR - ( 21 May 2023 13:56 )   PT: 16.5 sec;   INR: 1.38     PTT - ( 21 May 2023 13:56 )  PTT:35.8 sec    ABG - ( 21 May 2023 13:52 )  pH, Arterial: 7.42  pH, Blood: x     /  pCO2: 31    /  pO2: 133   / HCO3: 20    / Base Excess: -3.4  /  SaO2: 99.8      RADIOLOGY & ADDITIONAL STUDIES:  LA 1.1  Vimpat level 5/20 - pending  Sputum 3/27 : serratia marcescens  Bronch/sputum 5/17: Klebsiella pneumoniae/Proteus mirabilis  Blood Cultures 5/17: Klebsiella pneumoniae  UCx (-)  Blood Culture 5/18 (-) to date   Blood Cx 5/19 (-) to date   Bronch 5/19 - Klebsiella/Enterobacter/Enterococcus/Strep   Blood Cx 5/20 (-) x 2 to date   Bronch 5/20 - normal resp shraddha       Patient with complicated medical and surgical Hx with known pancreatitis and seizure disorder with uncontrolled seizures presenting for planned TEVAR 5/5 with some post-op LE weakness - improved with transfusion/inc MAP and lumbar drain - since removed with intercurrent progression of pancreatitis with bleeding/hemorrhagic shock - being followed by surgery; attempted IR - no arterial bleeding/no intervention - prior significant transfusion requirement for several days/ seizure meds titrated down - intercurrent concern for SIRS/early sepsis prompting cultures and presumptive Abx found to have Klebsiella pneumonia bacteremia - suspected GI & pulm source based on bronch 5/17      1. CV  prior Hemorrhagic shock - resolved   Sepsis - cultures (+)for Klebsiella pneumoniae; sputum with Klebsiella and proteus  titrated off pressors and inotropic support - lopressor started overnight in setting of hypertension and tachycardia - esmolol since started and at 50   ASA  diuresis     2. Neuro  valproate titrated off; EEG ongoing - defer to Neuro/Epilepsy   minimize sedation as much as able   vimpat recently increased - level pending      3. GI  pancreatitis - known from prior admit on CT (asymptomatic)  valproate can be associated with pancreatitis - titrated off 5/18  no transfusion support given since 5/14 - getting transfusion now  surgery following  new line placed 5/18 - ongoing TPN  CTa C/A/P 5/18: multiloculated necrosis of the distal pancreas contiguous with the low described retroperitoneal hematoma. No pseudoaneurysm or active bleeding. Several small pseudocysts or necrotic collections at the head of the largest 1.2 cm. Left retroperitoneal hematoma mildly   decreased in size now 16 x 10 cm, prior 20 x 11 cm. ET tube above the leeanne. Increased from prior diffuse bilateral groundglass and branching nodular opacities, possibly combination of pulmonary edema and multifocal pneumonia    4. Pulm   serial ABG to optimize oxygenation and ventilation   Abx targeting bronch culture  serial bronch specimens noted and reviewed - ? if oral contamination with bile/gastric contents - Bronch Cx 5/20 - normal shraddha   ongoing pulm toilet - bronch repeated today     5. ID  Abx escalated and now Meropenem/Ampicillin  culture data as above   blood Cultures surveillance negative   ID/Surgery following - considering diagnostic paracentesis and PET     6. Renal  monitor UO/Lytes and Cr  avoid nephrotoxins as much as able     serial labs to monitor closely  SCD and GI prophylaxis     d/w staff & CTS; wife updated at bedside     I have spent/provided stated minutes of nonprocedural critical care time to this patient: 90

## 2023-05-22 LAB
-  ERTAPENEM: SIGNIFICANT CHANGE UP
ALBUMIN FLD-MCNC: 2.3 G/DL — SIGNIFICANT CHANGE UP
ALBUMIN SERPL ELPH-MCNC: 2.3 G/DL — LOW (ref 3.3–5)
ALBUMIN SERPL ELPH-MCNC: 2.6 G/DL — LOW (ref 3.3–5)
ALBUMIN SERPL ELPH-MCNC: 2.6 G/DL — LOW (ref 3.3–5)
ALBUMIN SERPL ELPH-MCNC: 2.7 G/DL — LOW (ref 3.3–5)
ALBUMIN SERPL ELPH-MCNC: 2.8 G/DL — LOW (ref 3.3–5)
ALP SERPL-CCNC: 60 U/L — SIGNIFICANT CHANGE UP (ref 40–120)
ALP SERPL-CCNC: 66 U/L — SIGNIFICANT CHANGE UP (ref 40–120)
ALP SERPL-CCNC: 67 U/L — SIGNIFICANT CHANGE UP (ref 40–120)
ALP SERPL-CCNC: 68 U/L — SIGNIFICANT CHANGE UP (ref 40–120)
ALP SERPL-CCNC: 72 U/L — SIGNIFICANT CHANGE UP (ref 40–120)
ALT FLD-CCNC: 15 U/L — SIGNIFICANT CHANGE UP (ref 10–45)
ALT FLD-CCNC: 18 U/L — SIGNIFICANT CHANGE UP (ref 10–45)
ALT FLD-CCNC: 20 U/L — SIGNIFICANT CHANGE UP (ref 10–45)
ALT FLD-CCNC: 24 U/L — SIGNIFICANT CHANGE UP (ref 10–45)
ALT FLD-CCNC: 24 U/L — SIGNIFICANT CHANGE UP (ref 10–45)
AMYLASE FLD-CCNC: 1717 U/L — SIGNIFICANT CHANGE UP
AMYLASE FLD-CCNC: >7500 U/L — SIGNIFICANT CHANGE UP
AMYLASE P1 CFR SERPL: 234 U/L — HIGH (ref 25–125)
AMYLASE P1 CFR SERPL: 239 U/L — HIGH (ref 25–125)
ANION GAP SERPL CALC-SCNC: 12 MMOL/L — SIGNIFICANT CHANGE UP (ref 5–17)
ANION GAP SERPL CALC-SCNC: 13 MMOL/L — SIGNIFICANT CHANGE UP (ref 5–17)
ANION GAP SERPL CALC-SCNC: 13 MMOL/L — SIGNIFICANT CHANGE UP (ref 5–17)
ANION GAP SERPL CALC-SCNC: 14 MMOL/L — SIGNIFICANT CHANGE UP (ref 5–17)
ANION GAP SERPL CALC-SCNC: 15 MMOL/L — SIGNIFICANT CHANGE UP (ref 5–17)
APTT BLD: 31.6 SEC — SIGNIFICANT CHANGE UP (ref 27.5–35.5)
APTT BLD: 31.7 SEC — SIGNIFICANT CHANGE UP (ref 27.5–35.5)
APTT BLD: 33.5 SEC — SIGNIFICANT CHANGE UP (ref 27.5–35.5)
APTT BLD: 35.1 SEC — SIGNIFICANT CHANGE UP (ref 27.5–35.5)
AST SERPL-CCNC: 32 U/L — SIGNIFICANT CHANGE UP (ref 10–40)
AST SERPL-CCNC: 42 U/L — HIGH (ref 10–40)
AST SERPL-CCNC: 47 U/L — HIGH (ref 10–40)
AST SERPL-CCNC: 53 U/L — HIGH (ref 10–40)
AST SERPL-CCNC: 54 U/L — HIGH (ref 10–40)
B PERT IGG+IGM PNL SER: SIGNIFICANT CHANGE UP
B PERT IGG+IGM PNL SER: SIGNIFICANT CHANGE UP
BASE EXCESS BLDV CALC-SCNC: -2.6 MMOL/L — LOW (ref -2–3)
BASE EXCESS BLDV CALC-SCNC: -2.9 MMOL/L — LOW (ref -2–3)
BILIRUB SERPL-MCNC: 5.4 MG/DL — HIGH (ref 0.2–1.2)
BILIRUB SERPL-MCNC: 5.6 MG/DL — HIGH (ref 0.2–1.2)
BILIRUB SERPL-MCNC: 5.8 MG/DL — HIGH (ref 0.2–1.2)
BILIRUB SERPL-MCNC: 6.8 MG/DL — HIGH (ref 0.2–1.2)
BILIRUB SERPL-MCNC: 7.2 MG/DL — HIGH (ref 0.2–1.2)
BUN SERPL-MCNC: 76 MG/DL — HIGH (ref 7–23)
BUN SERPL-MCNC: 80 MG/DL — HIGH (ref 7–23)
BUN SERPL-MCNC: 83 MG/DL — HIGH (ref 7–23)
BUN SERPL-MCNC: 83 MG/DL — HIGH (ref 7–23)
BUN SERPL-MCNC: 89 MG/DL — HIGH (ref 7–23)
CALCIUM SERPL-MCNC: 8.4 MG/DL — SIGNIFICANT CHANGE UP (ref 8.4–10.5)
CALCIUM SERPL-MCNC: 8.7 MG/DL — SIGNIFICANT CHANGE UP (ref 8.4–10.5)
CALCIUM SERPL-MCNC: 8.9 MG/DL — SIGNIFICANT CHANGE UP (ref 8.4–10.5)
CALCIUM SERPL-MCNC: 9 MG/DL — SIGNIFICANT CHANGE UP (ref 8.4–10.5)
CALCIUM SERPL-MCNC: 9 MG/DL — SIGNIFICANT CHANGE UP (ref 8.4–10.5)
CHLORIDE SERPL-SCNC: 102 MMOL/L — SIGNIFICANT CHANGE UP (ref 96–108)
CHLORIDE SERPL-SCNC: 103 MMOL/L — SIGNIFICANT CHANGE UP (ref 96–108)
CHLORIDE SERPL-SCNC: 103 MMOL/L — SIGNIFICANT CHANGE UP (ref 96–108)
CK SERPL-CCNC: 17 U/L — LOW (ref 30–200)
CK SERPL-CCNC: 31 U/L — SIGNIFICANT CHANGE UP (ref 30–200)
CO2 BLDV-SCNC: 23.2 MMOL/L — SIGNIFICANT CHANGE UP (ref 22–26)
CO2 BLDV-SCNC: 25.1 MMOL/L — SIGNIFICANT CHANGE UP (ref 22–26)
CO2 SERPL-SCNC: 19 MMOL/L — LOW (ref 22–31)
CO2 SERPL-SCNC: 19 MMOL/L — LOW (ref 22–31)
CO2 SERPL-SCNC: 20 MMOL/L — LOW (ref 22–31)
CO2 SERPL-SCNC: 22 MMOL/L — SIGNIFICANT CHANGE UP (ref 22–31)
CO2 SERPL-SCNC: 22 MMOL/L — SIGNIFICANT CHANGE UP (ref 22–31)
COLOR FLD: SIGNIFICANT CHANGE UP
COLOR FLD: SIGNIFICANT CHANGE UP
COMMENT - FLUIDS: SIGNIFICANT CHANGE UP
COMMENT - FLUIDS: SIGNIFICANT CHANGE UP
CREAT SERPL-MCNC: 1.24 MG/DL — SIGNIFICANT CHANGE UP (ref 0.5–1.3)
CREAT SERPL-MCNC: 1.35 MG/DL — HIGH (ref 0.5–1.3)
CREAT SERPL-MCNC: 1.42 MG/DL — HIGH (ref 0.5–1.3)
CREAT SERPL-MCNC: 1.5 MG/DL — HIGH (ref 0.5–1.3)
CREAT SERPL-MCNC: 1.59 MG/DL — HIGH (ref 0.5–1.3)
CULTURE RESULTS: SIGNIFICANT CHANGE UP
CULTURE RESULTS: SIGNIFICANT CHANGE UP
EGFR: 51 ML/MIN/1.73M2 — LOW
EGFR: 55 ML/MIN/1.73M2 — LOW
EGFR: 59 ML/MIN/1.73M2 — LOW
EGFR: 62 ML/MIN/1.73M2 — SIGNIFICANT CHANGE UP
EGFR: 69 ML/MIN/1.73M2 — SIGNIFICANT CHANGE UP
FLUID INTAKE SUBSTANCE CLASS: SIGNIFICANT CHANGE UP
FLUID INTAKE SUBSTANCE CLASS: SIGNIFICANT CHANGE UP
GAS PNL BLDA: SIGNIFICANT CHANGE UP
GLUCOSE FLD-MCNC: 96 MG/DL — SIGNIFICANT CHANGE UP
GLUCOSE SERPL-MCNC: 125 MG/DL — HIGH (ref 70–99)
GLUCOSE SERPL-MCNC: 134 MG/DL — HIGH (ref 70–99)
GLUCOSE SERPL-MCNC: 144 MG/DL — HIGH (ref 70–99)
GLUCOSE SERPL-MCNC: 152 MG/DL — HIGH (ref 70–99)
GLUCOSE SERPL-MCNC: 154 MG/DL — HIGH (ref 70–99)
GRAM STN FLD: SIGNIFICANT CHANGE UP
GRAM STN FLD: SIGNIFICANT CHANGE UP
HCO3 BLDV-SCNC: 22 MMOL/L — SIGNIFICANT CHANGE UP (ref 22–29)
HCO3 BLDV-SCNC: 24 MMOL/L — SIGNIFICANT CHANGE UP (ref 22–29)
HCT VFR BLD CALC: 27.6 % — LOW (ref 39–50)
HCT VFR BLD CALC: 28.3 % — LOW (ref 39–50)
HCT VFR BLD CALC: 28.7 % — LOW (ref 39–50)
HCT VFR BLD CALC: 29.7 % — LOW (ref 39–50)
HCT VFR BLD CALC: 31.3 % — LOW (ref 39–50)
HGB BLD-MCNC: 10.5 G/DL — LOW (ref 13–17)
HGB BLD-MCNC: 9.3 G/DL — LOW (ref 13–17)
HGB BLD-MCNC: 9.3 G/DL — LOW (ref 13–17)
HGB BLD-MCNC: 9.5 G/DL — LOW (ref 13–17)
HGB BLD-MCNC: 9.9 G/DL — LOW (ref 13–17)
INR BLD: 1.22 — HIGH (ref 0.88–1.16)
INR BLD: 1.24 — HIGH (ref 0.88–1.16)
INR BLD: 1.25 — HIGH (ref 0.88–1.16)
INR BLD: 1.28 — HIGH (ref 0.88–1.16)
INR BLD: 1.34 — HIGH (ref 0.88–1.16)
ISTAT ARTERIAL BE: -5 MMOL/L — LOW (ref -2–3)
ISTAT ARTERIAL GLUCOSE: 137 MG/DL — HIGH (ref 70–99)
ISTAT ARTERIAL HCO3: 22 MMOL/L — SIGNIFICANT CHANGE UP (ref 22–26)
ISTAT ARTERIAL HEMATOCRIT: 29 % — LOW (ref 39–50)
ISTAT ARTERIAL HEMOGLOBIN: 9.9 G/DL — LOW (ref 13–17)
ISTAT ARTERIAL IONIZED CALCIUM: 1.19 MMOL/L — SIGNIFICANT CHANGE UP (ref 1.12–1.3)
ISTAT ARTERIAL PCO2: 48 MMHG — HIGH (ref 35–45)
ISTAT ARTERIAL PH: 7.26 — LOW (ref 7.35–7.45)
ISTAT ARTERIAL PO2: 263 MMHG — HIGH (ref 80–105)
ISTAT ARTERIAL POTASSIUM: 4.6 MMOL/L — SIGNIFICANT CHANGE UP (ref 3.5–5.3)
ISTAT ARTERIAL SO2: 100 % — HIGH (ref 95–98)
ISTAT ARTERIAL SODIUM: 137 MMOL/L — SIGNIFICANT CHANGE UP (ref 135–145)
ISTAT ARTERIAL TCO2: 23 MMOL/L — SIGNIFICANT CHANGE UP (ref 22–31)
LACTATE SERPL-SCNC: 1 MMOL/L — SIGNIFICANT CHANGE UP (ref 0.5–2)
LACTATE SERPL-SCNC: 1 MMOL/L — SIGNIFICANT CHANGE UP (ref 0.5–2)
LACTATE SERPL-SCNC: 1.1 MMOL/L — SIGNIFICANT CHANGE UP (ref 0.5–2)
LACTATE SERPL-SCNC: 1.1 MMOL/L — SIGNIFICANT CHANGE UP (ref 0.5–2)
LACTATE SERPL-SCNC: 2 MMOL/L — SIGNIFICANT CHANGE UP (ref 0.5–2)
LDH SERPL L TO P-CCNC: SIGNIFICANT CHANGE UP U/L
LIDOCAIN IGE QN: 467 U/L — HIGH (ref 7–60)
LIDOCAIN IGE QN: 494 U/L — HIGH (ref 7–60)
LIPASE FLD-CCNC: 3000 U/L — SIGNIFICANT CHANGE UP
LIPASE FLD-CCNC: >3000 U/L — SIGNIFICANT CHANGE UP
LYMPHOCYTES # FLD: 2 % — SIGNIFICANT CHANGE UP
MAGNESIUM SERPL-MCNC: 2.3 MG/DL — SIGNIFICANT CHANGE UP (ref 1.6–2.6)
MAGNESIUM SERPL-MCNC: 2.3 MG/DL — SIGNIFICANT CHANGE UP (ref 1.6–2.6)
MAGNESIUM SERPL-MCNC: 2.4 MG/DL — SIGNIFICANT CHANGE UP (ref 1.6–2.6)
MCHC RBC-ENTMCNC: 30.3 PG — SIGNIFICANT CHANGE UP (ref 27–34)
MCHC RBC-ENTMCNC: 30.5 PG — SIGNIFICANT CHANGE UP (ref 27–34)
MCHC RBC-ENTMCNC: 30.6 PG — SIGNIFICANT CHANGE UP (ref 27–34)
MCHC RBC-ENTMCNC: 30.8 PG — SIGNIFICANT CHANGE UP (ref 27–34)
MCHC RBC-ENTMCNC: 31 PG — SIGNIFICANT CHANGE UP (ref 27–34)
MCHC RBC-ENTMCNC: 32.9 GM/DL — SIGNIFICANT CHANGE UP (ref 32–36)
MCHC RBC-ENTMCNC: 33.1 GM/DL — SIGNIFICANT CHANGE UP (ref 32–36)
MCHC RBC-ENTMCNC: 33.3 GM/DL — SIGNIFICANT CHANGE UP (ref 32–36)
MCHC RBC-ENTMCNC: 33.5 GM/DL — SIGNIFICANT CHANGE UP (ref 32–36)
MCHC RBC-ENTMCNC: 33.7 GM/DL — SIGNIFICANT CHANGE UP (ref 32–36)
MCV RBC AUTO: 91.3 FL — SIGNIFICANT CHANGE UP (ref 80–100)
MCV RBC AUTO: 91.4 FL — SIGNIFICANT CHANGE UP (ref 80–100)
MCV RBC AUTO: 92 FL — SIGNIFICANT CHANGE UP (ref 80–100)
MCV RBC AUTO: 92.2 FL — SIGNIFICANT CHANGE UP (ref 80–100)
MCV RBC AUTO: 93.2 FL — SIGNIFICANT CHANGE UP (ref 80–100)
METHOD TYPE: SIGNIFICANT CHANGE UP
MONOS+MACROS # FLD: 8 % — SIGNIFICANT CHANGE UP
NEUTROPHILS-BODY FLUID: 88 % — SIGNIFICANT CHANGE UP
NEUTROPHILS-BODY FLUID: SIGNIFICANT CHANGE UP
NRBC # BLD: 0 /100 WBCS — SIGNIFICANT CHANGE UP (ref 0–0)
NRBC # FLD: 2 % — SIGNIFICANT CHANGE UP (ref 0–0)
ORGANISM # SPEC MICROSCOPIC CNT: SIGNIFICANT CHANGE UP
PCO2 BLDV: 38 MMHG — LOW (ref 42–55)
PCO2 BLDV: 46 MMHG — SIGNIFICANT CHANGE UP (ref 42–55)
PH BLDV: 7.32 — SIGNIFICANT CHANGE UP (ref 7.32–7.43)
PH BLDV: 7.37 — SIGNIFICANT CHANGE UP (ref 7.32–7.43)
PHOSPHATE SERPL-MCNC: 4.3 MG/DL — SIGNIFICANT CHANGE UP (ref 2.5–4.5)
PHOSPHATE SERPL-MCNC: 4.5 MG/DL — SIGNIFICANT CHANGE UP (ref 2.5–4.5)
PHOSPHATE SERPL-MCNC: 4.9 MG/DL — HIGH (ref 2.5–4.5)
PHOSPHATE SERPL-MCNC: 5.6 MG/DL — HIGH (ref 2.5–4.5)
PHOSPHATE SERPL-MCNC: 5.6 MG/DL — HIGH (ref 2.5–4.5)
PLATELET # BLD AUTO: 218 K/UL — SIGNIFICANT CHANGE UP (ref 150–400)
PLATELET # BLD AUTO: 235 K/UL — SIGNIFICANT CHANGE UP (ref 150–400)
PLATELET # BLD AUTO: 249 K/UL — SIGNIFICANT CHANGE UP (ref 150–400)
PLATELET # BLD AUTO: 252 K/UL — SIGNIFICANT CHANGE UP (ref 150–400)
PLATELET # BLD AUTO: 258 K/UL — SIGNIFICANT CHANGE UP (ref 150–400)
PO2 BLDV: 43 MMHG — SIGNIFICANT CHANGE UP (ref 25–45)
PO2 BLDV: 48 MMHG — HIGH (ref 25–45)
POTASSIUM SERPL-MCNC: 4.4 MMOL/L — SIGNIFICANT CHANGE UP (ref 3.5–5.3)
POTASSIUM SERPL-MCNC: 4.6 MMOL/L — SIGNIFICANT CHANGE UP (ref 3.5–5.3)
POTASSIUM SERPL-MCNC: 4.6 MMOL/L — SIGNIFICANT CHANGE UP (ref 3.5–5.3)
POTASSIUM SERPL-SCNC: 4.4 MMOL/L — SIGNIFICANT CHANGE UP (ref 3.5–5.3)
POTASSIUM SERPL-SCNC: 4.6 MMOL/L — SIGNIFICANT CHANGE UP (ref 3.5–5.3)
POTASSIUM SERPL-SCNC: 4.6 MMOL/L — SIGNIFICANT CHANGE UP (ref 3.5–5.3)
PROT FLD-MCNC: 4 G/DL — SIGNIFICANT CHANGE UP
PROT SERPL-MCNC: 5.4 G/DL — LOW (ref 6–8.3)
PROT SERPL-MCNC: 5.6 G/DL — LOW (ref 6–8.3)
PROT SERPL-MCNC: 5.8 G/DL — LOW (ref 6–8.3)
PROT SERPL-MCNC: 5.9 G/DL — LOW (ref 6–8.3)
PROT SERPL-MCNC: 6.1 G/DL — SIGNIFICANT CHANGE UP (ref 6–8.3)
PROTHROM AB SERPL-ACNC: 14.6 SEC — HIGH (ref 10.5–13.4)
PROTHROM AB SERPL-ACNC: 14.8 SEC — HIGH (ref 10.5–13.4)
PROTHROM AB SERPL-ACNC: 14.9 SEC — HIGH (ref 10.5–13.4)
PROTHROM AB SERPL-ACNC: 15.3 SEC — HIGH (ref 10.5–13.4)
PROTHROM AB SERPL-ACNC: 16 SEC — HIGH (ref 10.5–13.4)
RBC # BLD: 3 M/UL — LOW (ref 4.2–5.8)
RBC # BLD: 3.07 M/UL — LOW (ref 4.2–5.8)
RBC # BLD: 3.08 M/UL — LOW (ref 4.2–5.8)
RBC # BLD: 3.25 M/UL — LOW (ref 4.2–5.8)
RBC # BLD: 3.43 M/UL — LOW (ref 4.2–5.8)
RBC # FLD: 16 % — HIGH (ref 10.3–14.5)
RBC # FLD: 16.2 % — HIGH (ref 10.3–14.5)
RBC # FLD: 16.3 % — HIGH (ref 10.3–14.5)
RBC # FLD: 16.6 % — HIGH (ref 10.3–14.5)
RBC # FLD: 16.7 % — HIGH (ref 10.3–14.5)
RCV VOL RI: HIGH /UL (ref 0–0)
RCV VOL RI: HIGH /UL (ref 0–0)
SAO2 % BLDV: 71.1 % — SIGNIFICANT CHANGE UP (ref 67–88)
SAO2 % BLDV: 74.3 % — SIGNIFICANT CHANGE UP (ref 67–88)
SODIUM SERPL-SCNC: 136 MMOL/L — SIGNIFICANT CHANGE UP (ref 135–145)
SODIUM SERPL-SCNC: 137 MMOL/L — SIGNIFICANT CHANGE UP (ref 135–145)
SPECIMEN SOURCE FLD: SIGNIFICANT CHANGE UP
SPECIMEN SOURCE: SIGNIFICANT CHANGE UP
TOTAL NUCLEATED CELL COUNT, BODY FLUID: 6810 /UL — SIGNIFICANT CHANGE UP
TOTAL NUCLEATED CELL COUNT, BODY FLUID: SIGNIFICANT CHANGE UP /UL
TRIGL SERPL-MCNC: 151 MG/DL — HIGH
TRIGL SERPL-MCNC: 212 MG/DL — HIGH
TUBE TYPE: SIGNIFICANT CHANGE UP
TUBE TYPE: SIGNIFICANT CHANGE UP
WBC # BLD: 30.09 K/UL — HIGH (ref 3.8–10.5)
WBC # BLD: 31.57 K/UL — HIGH (ref 3.8–10.5)
WBC # BLD: 33.57 K/UL — HIGH (ref 3.8–10.5)
WBC # BLD: 35.71 K/UL — HIGH (ref 3.8–10.5)
WBC # BLD: 40 K/UL — CRITICAL HIGH (ref 3.8–10.5)
WBC # FLD AUTO: 30.09 K/UL — HIGH (ref 3.8–10.5)
WBC # FLD AUTO: 31.57 K/UL — HIGH (ref 3.8–10.5)
WBC # FLD AUTO: 33.57 K/UL — HIGH (ref 3.8–10.5)
WBC # FLD AUTO: 35.71 K/UL — HIGH (ref 3.8–10.5)
WBC # FLD AUTO: 40 K/UL — CRITICAL HIGH (ref 3.8–10.5)

## 2023-05-22 PROCEDURE — 99292 CRITICAL CARE ADDL 30 MIN: CPT | Mod: 25

## 2023-05-22 PROCEDURE — 49083 ABD PARACENTESIS W/IMAGING: CPT

## 2023-05-22 PROCEDURE — 99291 CRITICAL CARE FIRST HOUR: CPT

## 2023-05-22 RX ORDER — DIATRIZOATE MEGLUMINE 180 MG/ML
50 INJECTION, SOLUTION INTRAVESICAL ONCE
Refills: 0 | Status: COMPLETED | OUTPATIENT
Start: 2023-05-22 | End: 2023-05-22

## 2023-05-22 RX ORDER — ALBUMIN HUMAN 25 %
100 VIAL (ML) INTRAVENOUS EVERY 6 HOURS
Refills: 0 | Status: DISCONTINUED | OUTPATIENT
Start: 2023-05-22 | End: 2023-05-22

## 2023-05-22 RX ORDER — DEXMEDETOMIDINE HYDROCHLORIDE IN 0.9% SODIUM CHLORIDE 4 UG/ML
0.4 INJECTION INTRAVENOUS
Qty: 400 | Refills: 0 | Status: DISCONTINUED | OUTPATIENT
Start: 2023-05-22 | End: 2023-05-25

## 2023-05-22 RX ORDER — ELECTROLYTE SOLUTION,INJ
1 VIAL (ML) INTRAVENOUS
Refills: 0 | Status: DISCONTINUED | OUTPATIENT
Start: 2023-05-22 | End: 2023-05-22

## 2023-05-22 RX ORDER — BUMETANIDE 0.25 MG/ML
2 INJECTION INTRAMUSCULAR; INTRAVENOUS ONCE
Refills: 0 | Status: COMPLETED | OUTPATIENT
Start: 2023-05-22 | End: 2023-05-22

## 2023-05-22 RX ORDER — CISATRACURIUM BESYLATE 2 MG/ML
3 INJECTION INTRAVENOUS
Qty: 200 | Refills: 0 | Status: DISCONTINUED | OUTPATIENT
Start: 2023-05-22 | End: 2023-05-26

## 2023-05-22 RX ORDER — MIDAZOLAM HYDROCHLORIDE 1 MG/ML
0.02 INJECTION, SOLUTION INTRAMUSCULAR; INTRAVENOUS
Qty: 100 | Refills: 0 | Status: DISCONTINUED | OUTPATIENT
Start: 2023-05-22 | End: 2023-05-25

## 2023-05-22 RX ORDER — CISATRACURIUM BESYLATE 2 MG/ML
10 INJECTION INTRAVENOUS ONCE
Refills: 0 | Status: COMPLETED | OUTPATIENT
Start: 2023-05-22 | End: 2023-05-22

## 2023-05-22 RX ORDER — SODIUM BICARBONATE 1 MEQ/ML
50 SYRINGE (ML) INTRAVENOUS ONCE
Refills: 0 | Status: COMPLETED | OUTPATIENT
Start: 2023-05-22 | End: 2023-05-22

## 2023-05-22 RX ORDER — ALBUMIN HUMAN 25 %
50 VIAL (ML) INTRAVENOUS
Refills: 0 | Status: COMPLETED | OUTPATIENT
Start: 2023-05-22 | End: 2023-05-22

## 2023-05-22 RX ORDER — ALBUMIN HUMAN 25 %
50 VIAL (ML) INTRAVENOUS
Refills: 0 | Status: DISCONTINUED | OUTPATIENT
Start: 2023-05-22 | End: 2023-05-26

## 2023-05-22 RX ORDER — ACETAZOLAMIDE 250 MG/1
250 TABLET ORAL ONCE
Refills: 0 | Status: COMPLETED | OUTPATIENT
Start: 2023-05-22 | End: 2023-05-22

## 2023-05-22 RX ORDER — MIDAZOLAM HYDROCHLORIDE 1 MG/ML
2 INJECTION, SOLUTION INTRAMUSCULAR; INTRAVENOUS ONCE
Refills: 0 | Status: DISCONTINUED | OUTPATIENT
Start: 2023-05-22 | End: 2023-05-22

## 2023-05-22 RX ADMIN — Medication 50 MILLILITER(S): at 20:48

## 2023-05-22 RX ADMIN — Medication 216 GRAM(S): at 00:01

## 2023-05-22 RX ADMIN — BUMETANIDE 2 MILLIGRAM(S): 0.25 INJECTION INTRAMUSCULAR; INTRAVENOUS at 02:30

## 2023-05-22 RX ADMIN — PANTOPRAZOLE SODIUM 40 MILLIGRAM(S): 20 TABLET, DELAYED RELEASE ORAL at 11:29

## 2023-05-22 RX ADMIN — MIDAZOLAM HYDROCHLORIDE 2 MILLIGRAM(S): 1 INJECTION, SOLUTION INTRAMUSCULAR; INTRAVENOUS at 15:30

## 2023-05-22 RX ADMIN — Medication 216 GRAM(S): at 18:52

## 2023-05-22 RX ADMIN — CHLORHEXIDINE GLUCONATE 15 MILLILITER(S): 213 SOLUTION TOPICAL at 05:40

## 2023-05-22 RX ADMIN — Medication 50 MILLILITER(S): at 15:23

## 2023-05-22 RX ADMIN — CHLORHEXIDINE GLUCONATE 15 MILLILITER(S): 213 SOLUTION TOPICAL at 18:53

## 2023-05-22 RX ADMIN — Medication 50 MILLIEQUIVALENT(S): at 12:20

## 2023-05-22 RX ADMIN — MEROPENEM 100 MILLIGRAM(S): 1 INJECTION INTRAVENOUS at 14:00

## 2023-05-22 RX ADMIN — MIDAZOLAM HYDROCHLORIDE 1.4 MG/KG/HR: 1 INJECTION, SOLUTION INTRAMUSCULAR; INTRAVENOUS at 15:37

## 2023-05-22 RX ADMIN — CISATRACURIUM BESYLATE 10 MILLIGRAM(S): 2 INJECTION INTRAVENOUS at 09:13

## 2023-05-22 RX ADMIN — DIATRIZOATE MEGLUMINE 50 MILLILITER(S): 180 INJECTION, SOLUTION INTRAVESICAL at 13:21

## 2023-05-22 RX ADMIN — CHLORHEXIDINE GLUCONATE 1 APPLICATION(S): 213 SOLUTION TOPICAL at 05:40

## 2023-05-22 RX ADMIN — FENTANYL CITRATE 25 MICROGRAM(S): 50 INJECTION INTRAVENOUS at 19:20

## 2023-05-22 RX ADMIN — HEPARIN SODIUM 5000 UNIT(S): 5000 INJECTION INTRAVENOUS; SUBCUTANEOUS at 05:40

## 2023-05-22 RX ADMIN — CISATRACURIUM BESYLATE 12.6 MICROGRAM(S)/KG/MIN: 2 INJECTION INTRAVENOUS at 18:52

## 2023-05-22 RX ADMIN — MEROPENEM 100 MILLIGRAM(S): 1 INJECTION INTRAVENOUS at 06:25

## 2023-05-22 RX ADMIN — HEPARIN SODIUM 5000 UNIT(S): 5000 INJECTION INTRAVENOUS; SUBCUTANEOUS at 21:31

## 2023-05-22 RX ADMIN — ACETAZOLAMIDE 105 MILLIGRAM(S): 250 TABLET ORAL at 05:19

## 2023-05-22 RX ADMIN — MIDAZOLAM HYDROCHLORIDE 1.4 MG/KG/HR: 1 INJECTION, SOLUTION INTRAMUSCULAR; INTRAVENOUS at 18:52

## 2023-05-22 RX ADMIN — CISATRACURIUM BESYLATE 12.6 MICROGRAM(S)/KG/MIN: 2 INJECTION INTRAVENOUS at 11:17

## 2023-05-22 RX ADMIN — LACOSAMIDE 150 MILLIGRAM(S): 50 TABLET ORAL at 11:27

## 2023-05-22 RX ADMIN — Medication 216 GRAM(S): at 11:08

## 2023-05-22 RX ADMIN — Medication 216 GRAM(S): at 06:00

## 2023-05-22 RX ADMIN — FENTANYL CITRATE 25 MICROGRAM(S): 50 INJECTION INTRAVENOUS at 18:52

## 2023-05-22 RX ADMIN — MEROPENEM 100 MILLIGRAM(S): 1 INJECTION INTRAVENOUS at 21:32

## 2023-05-22 RX ADMIN — Medication 50 MILLILITER(S): at 21:52

## 2023-05-22 RX ADMIN — Medication 50 MILLIEQUIVALENT(S): at 12:21

## 2023-05-22 NOTE — PROGRESS NOTE ADULT - SUBJECTIVE AND OBJECTIVE BOX
INFECTIOUS DISEASES CONSULT FOLLOW-UP NOTE    INTERVAL HPI/OVERNIGHT EVENTS:      ROS:   Constitutional, eyes, ENT, cardiovascular, respiratory, gastrointestinal, genitourinary, integumentary, neurological, psychiatric and heme/lymph are otherwise negative other than noted above       ANTIBIOTICS/RELEVANT:    MEDICATIONS  (STANDING):  ampicillin  IVPB 2 Gram(s) IV Intermittent every 6 hours  aspirin  chewable 81 milliGRAM(s) Oral daily  chlorhexidine 0.12% Liquid 15 milliLiter(s) Oral Mucosa every 12 hours  chlorhexidine 2% Cloths 1 Application(s) Topical daily  cisatracurium Infusion 3 MICROgram(s)/kG/Min (12.6 mL/Hr) IV Continuous <Continuous>  CRRT Treatment    <Continuous>  diatrizoate meglumine/diatrizoate sodium. 50 milliLiter(s) Oral once  esmolol  Infusion 25 MICROgram(s)/kG/Min (10.5 mL/Hr) IV Continuous <Continuous>  heparin   Injectable 5000 Unit(s) SubCutaneous every 8 hours  lacosamide IVPB 250 milliGRAM(s) IV Intermittent every 12 hours  meropenem  IVPB 1000 milliGRAM(s) IV Intermittent every 8 hours  pantoprazole  Injectable 40 milliGRAM(s) IV Push daily  Parenteral Nutrition - Adult 1 Each (70 mL/Hr) TPN Continuous <Continuous>  Parenteral Nutrition - Adult 1 Each (70 mL/Hr) TPN Continuous <Continuous>  propofol Infusion 10 MICROgram(s)/kG/Min (4.2 mL/Hr) IV Continuous <Continuous>  PureFlow Dialysate RFP-400 (K 2 / Ca 3) 5000 milliLiter(s) (2000 mL/Hr) CRRT <Continuous>  sodium bicarbonate  Injectable 50 milliEquivalent(s) IV Push once  sodium bicarbonate  Injectable 50 milliEquivalent(s) IV Push once  sodium chloride 0.9%. 1000 milliLiter(s) (10 mL/Hr) IV Continuous <Continuous>    MEDICATIONS  (PRN):  fentaNYL    Injectable 25 MICROGram(s) IV Push every 3 hours PRN Severe Pain (7 - 10)        Vital Signs Last 24 Hrs  T(C): 36.4 (22 May 2023 11:28), Max: 38.9 (22 May 2023 08:00)  T(F): 101.5 (22 May 2023 11:00), Max: 102 (22 May 2023 08:00)  HR: 111 (22 May 2023 11:28) (99 - 137)  BP: 139/85 (22 May 2023 11:28) (139/85 - 139/85)  BP(mean): 106 (22 May 2023 11:28) (106 - 106)  RR: 25 (22 May 2023 11:28) (20 - 31)  SpO2: 98% (22 May 2023 11:28) (91% - 100%)    Parameters below as of 22 May 2023 11:28    O2 Flow (L/min): 60  O2 Concentration (%): 50    05-21-23 @ 07:01  -  05-22-23 @ 07:00  --------------------------------------------------------  IN: 3612.7 mL / OUT: 1470 mL / NET: 2142.7 mL    05-22-23 @ 07:01  -  05-22-23 @ 12:25  --------------------------------------------------------  IN: 625.3 mL / OUT: 95 mL / NET: 530.3 mL      PHYSICAL EXAM:  Constitutional: alert, NAD  Eyes: the sclera and conjunctiva were normal.   ENT: the ears and nose were normal in appearance.   Neck: the appearance of the neck was normal and the neck was supple.   Pulmonary: no respiratory distress and lungs were clear to auscultation bilaterally.   Heart: heart rate was normal and rhythm regular, normal S1 and S2  Vascular:. there was no peripheral edema  Abdomen: normal bowel sounds, soft, non-tender  Neurological: no focal deficits.   Psychiatric: the affect was normal        LABS:                        9.5    40.00 )-----------( 249      ( 22 May 2023 12:15 )             28.7     05-22    136  |  102  |  83<H>  ----------------------------<  154<H>  4.4   |  19<L>  |  1.42<H>    Ca    9.0      22 May 2023 07:51  Phos  4.5     05-22  Mg     2.3     05-22    TPro  6.1  /  Alb  2.6<L>  /  TBili  5.6<H>  /  DBili  x   /  AST  42<H>  /  ALT  18  /  AlkPhos  72  05-22    PT/INR - ( 22 May 2023 07:51 )   PT: 15.3 sec;   INR: 1.28          PTT - ( 22 May 2023 07:51 )  PTT:35.1 sec      MICROBIOLOGY:      RADIOLOGY & ADDITIONAL STUDIES:  Reviewed

## 2023-05-22 NOTE — PROCEDURE NOTE - NSICDXIRPOSTOP_GEN_A_CORE_FT
POST-OP DIAGNOSIS:  Ascites 22-May-2023 13:49:59  Enoc Wagner  Peripancreatic fluid collection 22-May-2023 13:50:08  Enoc Wagner

## 2023-05-22 NOTE — PROGRESS NOTE ADULT - SUBJECTIVE AND OBJECTIVE BOX
Subjective  No events overnight. Intubated and sedated/paralyzed.     ROS  Unable to obtain     MEDICATIONS  (STANDING):  albumin human 25% IVPB 50 milliLiter(s) IV Intermittent every 30 minutes  ampicillin  IVPB 2 Gram(s) IV Intermittent every 6 hours  aspirin  chewable 81 milliGRAM(s) Oral daily  chlorhexidine 0.12% Liquid 15 milliLiter(s) Oral Mucosa every 12 hours  chlorhexidine 2% Cloths 1 Application(s) Topical daily  cisatracurium Infusion 3 MICROgram(s)/kG/Min (12.6 mL/Hr) IV Continuous <Continuous>  CRRT Treatment    <Continuous>  esmolol  Infusion 25 MICROgram(s)/kG/Min (10.5 mL/Hr) IV Continuous <Continuous>  heparin   Injectable 5000 Unit(s) SubCutaneous every 8 hours  lacosamide IVPB 250 milliGRAM(s) IV Intermittent every 12 hours  meropenem  IVPB 1000 milliGRAM(s) IV Intermittent every 8 hours  midazolam Infusion 0.02 mG/kG/Hr (1.4 mL/Hr) IV Continuous <Continuous>  pantoprazole  Injectable 40 milliGRAM(s) IV Push daily  Parenteral Nutrition - Adult 1 Each (70 mL/Hr) TPN Continuous <Continuous>  Parenteral Nutrition - Adult 1 Each (70 mL/Hr) TPN Continuous <Continuous>  propofol Infusion 10 MICROgram(s)/kG/Min (4.2 mL/Hr) IV Continuous <Continuous>  PureFlow Dialysate RFP-400 (K 2 / Ca 3) 5000 milliLiter(s) (2000 mL/Hr) CRRT <Continuous>  sodium chloride 0.9%. 1000 milliLiter(s) (10 mL/Hr) IV Continuous <Continuous>    MEDICATIONS  (PRN):  fentaNYL    Injectable 25 MICROGram(s) IV Push every 3 hours PRN Severe Pain (7 - 10)      T(C): 37.1 (05-22-23 @ 14:30), Max: 38.9 (05-22-23 @ 08:00)  HR: 116 (05-22-23 @ 16:00) (99 - 120)  BP: 139/85 (05-22-23 @ 11:28) (139/85 - 139/85)  RR: 22 (05-22-23 @ 16:00) (20 - 32)  SpO2: 96% (05-22-23 @ 16:00) (86% - 100%)  Wt(kg): --    Intubated, Sedated, Paralyzed  Abdomen distended    CBC Full  -  ( 22 May 2023 12:15 )  WBC Count : 40.00 K/uL  RBC Count : 3.08 M/uL  Hemoglobin : 9.5 g/dL  Hematocrit : 28.7 %  Platelet Count - Automated : 249 K/uL  Mean Cell Volume : 93.2 fl  Mean Cell Hemoglobin : 30.8 pg  Mean Cell Hemoglobin Concentration : 33.1 gm/dL  Auto Neutrophil # : x  Auto Lymphocyte # : x  Auto Monocyte # : x  Auto Eosinophil # : x  Auto Basophil # : x  Auto Neutrophil % : x  Auto Lymphocyte % : x  Auto Monocyte % : x  Auto Eosinophil % : x  Auto Basophil % : x    05-22    136  |  103  |  83<H>  ----------------------------<  144<H>  4.6   |  20<L>  |  1.50<H>    Ca    8.4      22 May 2023 12:15  Phos  5.6     05-22  Mg     2.4     05-22    TPro  5.4<L>  /  Alb  2.3<L>  /  TBili  5.8<H>  /  DBili  x   /  AST  47<H>  /  ALT  20  /  AlkPhos  66  05-22    LIVER FUNCTIONS - ( 22 May 2023 12:15 )  Alb: 2.3 g/dL / Pro: 5.4 g/dL / ALK PHOS: 66 U/L / ALT: 20 U/L / AST: 47 U/L / GGT: x           PT/INR - ( 22 May 2023 12:15 )   PT: 14.9 sec;   INR: 1.25          PTT - ( 22 May 2023 12:15 )  PTT:33.5 sec      EEG:  Continuous Marked generalized slowing suggestive of a Marked degree of diffuse or multifocal  dysfunction.

## 2023-05-22 NOTE — PROGRESS NOTE ADULT - SUBJECTIVE AND OBJECTIVE BOX
INTERVAL COURSE  S/p LVP 5 L drained by IR   Remeained sedated and paralyzed/ Off Prop for rising TG  On Meropenem and Amp ( Klebsiella bacteremia and klebsiella and enterococcus pneumonia)  Starkey scanned today   TPN continued/ [place] sump on suction   Started on CVVJD for rising Cr and oliguria     VITALS  Vital Signs Last 24 Hrs  T(C): 37.1 (22 May 2023 21:00), Max: 38.9 (22 May 2023 08:00)  T(F): 98.7 (22 May 2023 21:00), Max: 102 (22 May 2023 08:00)  HR: 103 (22 May 2023 23:00) (94 - 120)  BP: 139/85 (22 May 2023 11:28) (139/85 - 139/85)  BP(mean): 106 (22 May 2023 11:28) (106 - 106)  RR: 24 (22 May 2023 23:00) (20 - 32)  SpO2: 100% (22 May 2023 23:00) (86% - 100%)  Parameters below as of 22 May 2023 23:00  Patient On (Oxygen Delivery Method): ventilator  O2 Concentration (%): 50    I&O's Summary    22 May 2023 07:01  -  23 May 2023 00:27  --------------------------------------------------------  IN: 1478.2 mL / OUT: 2090 mL / NET: -611.8 mL      PHYSICAL EXAM  General: profoundly sedated and paralyzed   Respiratory: Rhonchorous   Cardiovascular: Sinus tachycardia   Gastrointestinal: Distended but sifter  Extremities: Warm, anasarcic     IMAGING/EKG/ETC  Reviewed. Improving pulmonary congestion

## 2023-05-22 NOTE — PROGRESS NOTE ADULT - ASSESSMENT
53 year-old male with PMH HTN, type A aortic dissection s/p Dacron grafts, AV resuspension in 2013, CAD s/p CABG x 1 SVG to RCA (5/2013 with Dr. Medina), seizure disorder (last episode on 7/4/22) S/P replacement of transverse aortic arch, second stage thoracic endovascular aortic repair, aorto-axillary bypass, AV replacement (bio 23mm), in APr 2023 and CABG x 1 (SVG-RCA) EF 75% (Ignacio, 3/20) now s/p 2nd state TEVAR on 5/5, and was found to have acute pancreatitis with large peripancreatic/perigastric fluid collections on CTA abdomen on 5/8. Epilepsy team following for suspected seizure event on 5/14/23 that did not correlate with findings on EEG recordings. Also concerns for drug-related (depakote) hemorrhagic pancreatitis, and hereby the medication has been weaned. CTH obtained on 5/18 showed no acute pathology.     RECOMMENDATIONS:   - Ok to d/c vEEG  - c/w Vimpat 250mg Q12hrs  - Ativan 2mg IVP for seizures > 2mins  - Keep Mg>2 and K >4.   - Rest of management per primary team.

## 2023-05-22 NOTE — PROGRESS NOTE ADULT - ASSESSMENT
52 yo male hx seizure disorder, aortic dissection s/p AVR, aortic graft revision 3/20/2023, second stage TEVAR 5/5/2023, course c/b peripancreatic/RP hemorrhage/hematoma formation.       #Klebsiella Bacteremia  Klebsiella BSI i/s/o VAP and pancreatic necrosis/RP hemorrhage (adjacent to aortic vascular graft). f/u surveillance bcx 5/18, blood cultures X 2 from 5/19, sputum culture 5/19, bronch culture from today; given sz disorder, changed cefepime to Zosyn 5/18. Sputum culture from 5/19 with Klebsiella pneumoniae, ESBL-producing Enterobacter cloacae (S shin, I erta), E. faecalis and Strep anginosus.     - Recommend Obtain PET/CT to assess aortic graft for infection since close proximity to infected hematoma  - f/u culture data  - c/w meropenem 1 g IV q8h and ampicillin 2 g IV q6h  - Agree with Surgery - would sample peritoneal fluid for cell count with diff, culture.  - recommend RUQ US to assess for hepatobiliary source of infection i/s/o increasing serum bilirubin  - advise against any fluoroquinolones due to increased risk of aortic aneurysmal rupture        Team 1 will continue to follow 54 yo male hx seizure disorder, aortic dissection s/p AVR, aortic graft revision 3/20/2023, second stage TEVAR 5/5/2023, course c/b peripancreatic/RP hemorrhage/hematoma formation.       #Klebsiella Bacteremia  Klebsiella BSI i/s/o VAP and pancreatic necrosis/RP hemorrhage (adjacent to aortic vascular graft). f/u surveillance bcx 5/18, blood cultures X 2 from 5/19, sputum culture 5/19. Sputum culture from 5/19 with Klebsiella pneumoniae, Enterobacter cloacae (S shin, I erta), E. faecalis and Strep anginosus.     - f/u culture data  - c/w meropenem 1 g IV q8h and ampicillin 2 g IV q6h  - Agree with Surgery - would sample peritoneal fluid for cell count with diff, culture.  - recommend RUQ US to assess for hepatobiliary source of infection i/s/o increasing serum bilirubin  - advise against any fluoroquinolones due to increased risk of aortic aneurysmal rupture        Team 1 will continue to follow

## 2023-05-22 NOTE — CONSULT NOTE ADULT - ASSESSMENT
53 YO Male, HTN aortic dissection previous admission with severe cardiogenic shock TREY requiring CVVHD presented o the ED with worsening epigastric pain CTA/P revealed continued endoleak hospital course complicated by necrotic pancreatitis now with non-oliguric TREY    #non-oliguric TREY  #necrotizing pancreatitis    recommend:  given TREY, decreasing uop and anticipated known contrast load will initiate CVVHD  qb: 300ml/min net negative 50ml/hour (uptitrate accordingly) DFR 2L/hour  q8H BMP phos while on CVVHD  maintain MAP > 70 for adequate renal perfusion  strict i's and o's  renally dose abx egfr <15  would repeat UA with urine studies     Malika Garcia D.O  PGY 5 nephrology [facility]  [phone]

## 2023-05-22 NOTE — PROGRESS NOTE ADULT - SUBJECTIVE AND OBJECTIVE BOX
CTICU  CRITICAL  CARE  attending     Hand off received 					   Pertinent clinical, laboratory, radiographic, hemodynamic, echocardiographic, respiratory data, microbiologic data and chart were reviewed and analyzed frequently throughout the course of the day and night  Patient seen and examined with CTS/ SH attending at bedside  Pt is a 53y , Male, HEALTH ISSUES - PROBLEM Dx:      , FAMILY HISTORY:  No pertinent family history in first degree relatives    PAST MEDICAL & SURGICAL HISTORY:  HTN (hypertension)      Aortic dissection      CAD (coronary artery disease)      Seizure disorder      S/P aortic bifurcation bypass graft      S/P CABG x 1        Patient is a 53y old  Male who presents with a chief complaint of endoleak, abdominal pain (22 May 2023 14:14)      14 system review was unremarkable    Vital signs, hemodynamic and respiratory parameters were reviewed from the bedside nursing flowsheet.  ICU Vital Signs Last 24 Hrs  T(C): 37.1 (22 May 2023 21:00), Max: 38.9 (22 May 2023 08:00)  T(F): 98.7 (22 May 2023 21:00), Max: 102 (22 May 2023 08:00)  HR: 103 (22 May 2023 23:00) (94 - 120)  BP: 139/85 (22 May 2023 11:28) (139/85 - 139/85)  BP(mean): 106 (22 May 2023 11:28) (106 - 106)  ABP: 144/83 (22 May 2023 23:00) (71/45 - 144/83)  ABP(mean): 108 (22 May 2023 23:00) (50 - 108)  RR: 24 (22 May 2023 23:00) (20 - 32)  SpO2: 100% (22 May 2023 23:00) (86% - 100%)    O2 Parameters below as of 22 May 2023 23:00  Patient On (Oxygen Delivery Method): ventilator    O2 Concentration (%): 50      Adult Advanced Hemodynamics Last 24 Hrs  CVP(mm Hg): 12 (22 May 2023 22:00) (-12 - 22)  CVP(cm H2O): --  CO: --  CI: --  PA: --  PA(mean): --  PCWP: --  SVR: --  SVRI: --  PVR: --  PVRI: --, ABG - ( 22 May 2023 22:16 )  pH, Arterial: 7.37  pH, Blood: x     /  pCO2: 38    /  pO2: 131   / HCO3: 22    / Base Excess: -2.9  /  SaO2: 99.6              Mode: AC/ CMV (Assist Control/ Continuous Mandatory Ventilation)  RR (machine): 22  TV (machine): 500  FiO2: 50  PEEP: 8  ITime: 1.2  MAP: 13  PIP: 23    Intake and output was reviewed and the fluid balance was calculated  Daily Height in cm: 182.88 (22 May 2023 11:28)    Daily   I&O's Summary    21 May 2023 07:01  -  22 May 2023 07:00  --------------------------------------------------------  IN: 3612.7 mL / OUT: 1470 mL / NET: 2142.7 mL    22 May 2023 07:01  -  22 May 2023 23:34  --------------------------------------------------------  IN: 1245 mL / OUT: 1823 mL / NET: -578 mL        All lines and drain sites were assessed  Glycemic trend was reviewedCAPILLARY BLOOD GLUCOSE        No acute change in mental status  Auscultation of the chest reveals equal bs  Abdomen is soft  Extremities are warm and well perfused  Wounds appear clean and unremarkable  Antibiotics are periop    labs  CBC Full  -  ( 22 May 2023 22:12 )  WBC Count : 30.09 K/uL  RBC Count : 3.07 M/uL  Hemoglobin : 9.3 g/dL  Hematocrit : 28.3 %  Platelet Count - Automated : 218 K/uL  Mean Cell Volume : 92.2 fl  Mean Cell Hemoglobin : 30.3 pg  Mean Cell Hemoglobin Concentration : 32.9 gm/dL  Auto Neutrophil # : x  Auto Lymphocyte # : x  Auto Monocyte # : x  Auto Eosinophil # : x  Auto Basophil # : x  Auto Neutrophil % : x  Auto Lymphocyte % : x  Auto Monocyte % : x  Auto Eosinophil % : x  Auto Basophil % : x    05-22    136  |  102  |  80<H>  ----------------------------<  134<H>  4.4   |  22  |  1.35<H>    Ca    8.9      22 May 2023 22:12  Phos  4.9     05-22  Mg     2.3     05-22    TPro  5.8<L>  /  Alb  2.7<L>  /  TBili  7.2<H>  /  DBili  x   /  AST  53<H>  /  ALT  24  /  AlkPhos  67  05-22    PT/INR - ( 22 May 2023 22:12 )   PT: 14.6 sec;   INR: 1.22          PTT - ( 22 May 2023 22:12 )  PTT:31.6 sec  The current medications were reviewed   MEDICATIONS  (STANDING):  albumin human 25% IVPB 50 milliLiter(s) IV Intermittent every 30 minutes  ampicillin  IVPB 2 Gram(s) IV Intermittent every 6 hours  aspirin  chewable 81 milliGRAM(s) Oral daily  chlorhexidine 0.12% Liquid 15 milliLiter(s) Oral Mucosa every 12 hours  chlorhexidine 2% Cloths 1 Application(s) Topical daily  cisatracurium Infusion 3 MICROgram(s)/kG/Min (12.6 mL/Hr) IV Continuous <Continuous>  CRRT Treatment    <Continuous>  dexMEDEtomidine Infusion 0.4 MICROgram(s)/kG/Hr (7 mL/Hr) IV Continuous <Continuous>  esmolol  Infusion 25 MICROgram(s)/kG/Min (10.5 mL/Hr) IV Continuous <Continuous>  heparin   Injectable 5000 Unit(s) SubCutaneous every 8 hours  lacosamide IVPB 250 milliGRAM(s) IV Intermittent every 12 hours  meropenem  IVPB 1000 milliGRAM(s) IV Intermittent every 8 hours  midazolam Infusion 0.02 mG/kG/Hr (1.4 mL/Hr) IV Continuous <Continuous>  pantoprazole  Injectable 40 milliGRAM(s) IV Push daily  Parenteral Nutrition - Adult 1 Each (70 mL/Hr) TPN Continuous <Continuous>  propofol Infusion 10 MICROgram(s)/kG/Min (4.2 mL/Hr) IV Continuous <Continuous>  PureFlow Dialysate RFP-400 (K 2 / Ca 3) 5000 milliLiter(s) (2000 mL/Hr) CRRT <Continuous>  sodium chloride 0.9%. 1000 milliLiter(s) (10 mL/Hr) IV Continuous <Continuous>    MEDICATIONS  (PRN):  fentaNYL    Injectable 25 MICROGram(s) IV Push every 3 hours PRN Severe Pain (7 - 10)       PROBLEM LIST/ ASSESSMENT:  HEALTH ISSUES - PROBLEM Dx:      ,   Patient is a 53y old  Male who presents with a chief complaint of endoleak, abdominal pain (22 May 2023 14:14)     s/p cardiac surgery    Acute systolic and diastolic heart failure evidenced by SOB and parenchymal infiltrates; will treat with diuresis    Cardiogenic shock on ionotropy    Vasogenic shock due to hypotension in the cticu , will keep on pressors    Hypovolemic shock - > 20% intravascular depletion will replete volume    Acute blood loss anemia with relative hypotension treated with > 1 unit PC      Acute respiratory insufficiency  ruled in due to prolonged mechanical ventilation > 24 hrs on the vent due to failure to wean due to weak respiratory mechanics        Acidosis evidenced by anion gap and negative base excess    Acute kidney injury - creatinine > 0.3 due to combined prerenal and intrarenal factors can presume ATN      Moderate protein calorie malutrition        My plan includes :    will brito scan    appreciate dr susannah pineda  appreciate dr preston help    no gas in collection    will request sampling of pancr fl    will request sampling of ascitic fluid     close hemodynamic, ventilatory and drain monitoring and management per post op routine    Monitor for arrhythmias and monitor parameters for organ perfusion  beta blockade not administered due to hemodynamic instability and bradycardia  monitor neurologic status  Head of the bed should remain elevated to 45 deg .   chest PT and IS will be encouraged  monitor adequacy of oxygenation and ventilation and attempt to wean oxygen  antibiotic regimen will be tailored to the clinical, laboratory and microbiologic data  Nutritional goals will be met using po eventually , ensure adequate caloric intake and montior the same  Stress ulcer and VTE prophylaxis will be achieved    Glycemic control is satisfactory  Electrolytes have been repleted as necessary and wound care has been carried out. Pain control has been achieved.   agressive physical therapy and early mobility and ambulation goals will be met   The family was updated about the course and plan  CRITICAL CARE TIME Upon my evaluation, this patient had a high probability of imminent or life-threatening deterioration due to the above problems which required my direct attention, intervention, and personal management.  I have personally provided 110 minutes of critical care time exclusive of time spent on separately billable procedures. Time included review of laboratory data, radiology results, discussion with consultants, and monitoring for potential decompensation. Interventions were performed as documented abovepersonally provided by me  in evaluation and management, reassessments, review and interpretation of labs and x-rays, ventilator and hemodynamic management, formulating a plan and coordinating care: ___110___ MIN.  Time does not include procedural time.    CTICU ATTENDING     					    Bubba Craft MD

## 2023-05-22 NOTE — CONSULT NOTE ADULT - ASSESSMENT
Assessment: 54yo Male pt with PMH HTN, type A aortic dissection s/p Dacron grafts, AV resuspension in 2013, CAD s/p CABG x 1 SVG to RCA (5/2013 with Dr. Mdeina), seizure disorder (last episode on 7/4/22) S/P replacement of transverse aortic arch, second stage thoracic endovascular aortic repair, aorto-axillary bypass, AV replacement (bio 23mm), in APr 2023 and CABG x 1 (SVG-RCA) EF 75% (Ignacio, 3/20) now s/p 2nd state TEVAR on 5/5 for whom surgery was consulted for finding of acute pancreatitis with large peripancreatic/perigastric fluid collections on CTA abdomen 5/8 then acute hemorrhage starting 5/12/23 requiring 11 U pRBC over last 48 hours but with negative mesenteric angiogram 5/13/23. Repeat CT showing ascites and ?pancreatic collection. IR consulted for paracentesis and LUQ collection drainage. Case reviewed with Dr. Turk, plan for procedure with anesthesia.     Recommendations: Maintain NPO x 8 hours prior to procedure. D/C blood thinners.     Communicated with: Dr. Craft

## 2023-05-22 NOTE — CHART NOTE - NSCHARTNOTEFT_GEN_A_CORE
Admitting Diagnosis:   Patient is a 53y old  Male who presents with a chief complaint of endoleak, abdominal pain (22 May 2023 11:47)      PAST MEDICAL & SURGICAL HISTORY:  HTN (hypertension)      Aortic dissection      CAD (coronary artery disease)      Seizure disorder      S/P aortic bifurcation bypass graft      S/P CABG x 1        Current Nutrition Order:  TPN: 200g Dex, 100g AA, 1580Kcal, 100g protein, 1.9 GIR (based on 70kg)    *Ordered for Propofol, Rate 21ml provides ~554kcal. Total kcal from TPN/Propofol=2134kcal     PO Intake: Good (%) [   ]  Fair (50-75%) [   ] Poor (<25%) [   ]-- NA NPO/TPN     GI Issues:   RN reports large BM    +OGT LWS, seen with Dark OP this AM, canister nearly full   Protonix ordered     Pain:   +Pain per flow sheets    Skin Integrity:  Sandro 12  Stage II sacrum, pressure ulcer, Skin Tear Noted   Edema: 3+Gen/Scrotum    Labs:       136  |  103  |  83<H>  ----------------------------<  144<H>  4.6   |  20<L>  |  1.50<H>    Ca    8.4      22 May 2023 12:15  Phos  5.6       Mg     2.4         TPro  5.4<L>  /  Alb  2.3<L>  /  TBili  5.8<H>  /  DBili  x   /  AST  47<H>  /  ALT  20  /  AlkPhos  66      CAPILLARY BLOOD GLUCOSE          Medications:  MEDICATIONS  (STANDING):  albumin human 25% IVPB 50 milliLiter(s) IV Intermittent every 30 minutes  ampicillin  IVPB 2 Gram(s) IV Intermittent every 6 hours  aspirin  chewable 81 milliGRAM(s) Oral daily  chlorhexidine 0.12% Liquid 15 milliLiter(s) Oral Mucosa every 12 hours  chlorhexidine 2% Cloths 1 Application(s) Topical daily  cisatracurium Infusion 3 MICROgram(s)/kG/Min (12.6 mL/Hr) IV Continuous <Continuous>  CRRT Treatment    <Continuous>  diatrizoate meglumine/diatrizoate sodium. 50 milliLiter(s) Oral once  esmolol  Infusion 25 MICROgram(s)/kG/Min (10.5 mL/Hr) IV Continuous <Continuous>  heparin   Injectable 5000 Unit(s) SubCutaneous every 8 hours  lacosamide IVPB 250 milliGRAM(s) IV Intermittent every 12 hours  meropenem  IVPB 1000 milliGRAM(s) IV Intermittent every 8 hours  pantoprazole  Injectable 40 milliGRAM(s) IV Push daily  Parenteral Nutrition - Adult 1 Each (70 mL/Hr) TPN Continuous <Continuous>  Parenteral Nutrition - Adult 1 Each (70 mL/Hr) TPN Continuous <Continuous>  propofol Infusion 10 MICROgram(s)/kG/Min (4.2 mL/Hr) IV Continuous <Continuous>  PureFlow Dialysate RFP-400 (K 2 / Ca 3) 5000 milliLiter(s) (2000 mL/Hr) CRRT <Continuous>  sodium bicarbonate  Injectable 50 milliEquivalent(s) IV Push once  sodium bicarbonate  Injectable 50 milliEquivalent(s) IV Push once  sodium chloride 0.9%. 1000 milliLiter(s) (10 mL/Hr) IV Continuous <Continuous>    MEDICATIONS  (PRN):  fentaNYL    Injectable 25 MICROGram(s) IV Push every 3 hours PRN Severe Pain (7 - 10)        6'0''  pounds+-10%   Wt 154 pounds BMI 20.9 %IBW87    Weight Change: Based on most recent EMR wt     Estimated energy needs:   current body wt used for energy calculations as pt falls within % IBW  adjust for age, post op needs, pancreatitis, pressure ulcer, ICU level of care, Vent, plan for CVVHD HD   25-30kcal/k-2094kcal/day   2.0-2.5gm/k-175gm prot/day     Subjective:    53yr old male with PMH HTN, 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 Recent admission 3/20- during which patient underwent replacement of transverse aortic arch, second stage thoracic endovascular aortic repair, aorto-axillary bypass, AV replacement (bio 23mm), and CABG x 1 (SVG-RCA) EF 75% (Ignacio, 3/20). Post op cb lactic acidosis, severe cardiogenic and vasogenic shock, TREY requiring CVVHD and temporary dialysis requirement. Recent admission - for seizure episode, were his AEDS were adjusted. Presented to [place] ED  for epigastric pain. CT exam which showed known endoleak for which pt was tx to [facility]. Patient underwent TEVAR with Dr. Pierre , LD placed prior by NSGY. Arrived intubated on propofol.  extubated. 1 unit pRBC, CTH performed for decreased LE mvmt. Improved later in day.  LD clamped and patient ambulated, plan for dc.  US showing groin seroma and hematoma. CT scan showing necrotizing pancreatitis. Zosyn started and Gen surg consulted. Recommended IR and GI consults and further evaluation with imaging. Gi believes 2/2 insults from surgeries back in March and recommended outpatient fu.  new RIJ and PA catheter placed, R pigtail placed cb pneumothorax.  gen surg signed off. Repeat imaging showing concern for possible malignancy in pancreatic tail. Radiology attending called during day to say findings likely 2/2 pancreatitis and recommended outpatient contrast study. Around 7pm patient had questionable seizure-like episode. Given 2mg ativan. Labs sig for Hb drop to ~6. TREY with oliguria. Stat CT showing [id number] hematoma, mod-large ascites with layering in pelvis. Given 4 units pRBC and sent to IR for possible intervention.  returned from Ir intubated, no arterial bleeder identified therefore no intervention performed. Hb ~8, gen surg recommending additional transfusion.  Total transfusion 7 pRBC, 2 cryo, 2 FFP, 1 plt. Was given additional blood overnight (1 pRBC). Gen surg on board.  1 unit pRBC during day for episode of hypotension. 1 episode of seizure resolved with propofol push. Vimpat dose adjusted per neurology. Given 2 FFP and 1 plt. 5/15 discussed with neurology possibilty of depakote induced pancreatitis-dose being adjusted. 5/15 no changes, depakote level adjusted, eeg neg. New line placed 5/15. Pt with concern for clinical picture of SIRS/early sepsis  - prompting imaging and initiation of presumptive Abx. CT  showed pancreatic necrosis with loculations, slightly improved LLQ hematoma without gas to suggest superinfeciton. Primacor titrated off  and lopressor dosings given with improvement in tachycardia, now on esmolol infusion. S/p Bronch , ; xray improved after white out in AM. Nephrology consulted for CVVHD.    Pt seen this AM on [facility]. EEG ongoing. RN at bedside. Pt vented: CMV mode. MAP 89. Remains ordered for cisatracurium, esmolol, propofol Infusion (rate 21ml/hr x24hrs provides ~554kcal). TPN remains ongoing. +OGT LWS, seen with Dark OP this AM, cainster nearly full. RN reports having large OP since . Noted ephrology consulted for CVVHD, team reports pt likely to begin CVVHD and asking for TPN Recs to be adjusted based on pending CVVHD plan.   Labs: Na K Mg Phos WDL, BUN/Cr 83/1.42,   (29 5/10), Amylase 239  (101 /14), Lipase 494  (82 /14), .   Please see below for nutritions recommendations. D/w team.     Previous Nutrition Diagnosis: Inadequate Energy Intake RT intake<EER AEB clears  New PES: Increased nutrients RT increased demands AEB Vent   >>on going   GOAL: Pt will meet at least 75% of nutrient needs via feasible route    Recommendations:  1. Should PN remain consider the following:   >> TPN: 185Dex, 140g AA. Will provide ~1189Kcal (1743kcal with current propofol rate of 21ml/hr), 140g protein, 1.8 GIR (Based on 70kg). Needs based on plan for CVVHD-if pt not to start on CVVHD please reconsult for adjustments.   >> Would recommend to hold lipids with TPN at this time given use of Propfol with noted increased in TG, Amylase and lipase.   >> Recommend Checking TG bi-weekly, Check Mg, Phos, K daily and POC BG Q6hrs. Cont to Check Lipase and Amylase Every Other Day. Replete Lytes PRN. Trend daily weights. Fluids and lytes per MD discretion.  >> If medically feasible, ? if able to change from propofol as sedative to a sedative which will not provide kcal from fat.   2. Pain/GI per team.  3. Labs: monitor BMP, CBC, glucose, lytes, trend renal indices, LFTs, POCT, lipids/TG, lactate, Amylase and Lipase; MAP.  4. RD to remain available for additional nutrition interventions as needed.     Education: NA.    Risk Level: High [X  ] Moderate [   ] Low [   ]. Admitting Diagnosis:   Patient is a 53y old  Male who presents with a chief complaint of endoleak, abdominal pain (22 May 2023 11:47)      PAST MEDICAL & SURGICAL HISTORY:  HTN (hypertension)      Aortic dissection      CAD (coronary artery disease)      Seizure disorder      S/P aortic bifurcation bypass graft      S/P CABG x 1        Current Nutrition Order:  TPN: 200g Dex, 100g AA, 1580Kcal, 100g protein, 1.9 GIR (based on 70kg)    *Ordered for Propofol, Rate 21ml provides ~554kcal. Total kcal from TPN/Propofol=2134kcal     PO Intake: Good (%) [   ]  Fair (50-75%) [   ] Poor (<25%) [   ]-- NA NPO/TPN     GI Issues:   RN reports large BM    +OGT LWS, seen with Dark OP this AM, canister nearly full   Protonix ordered     Pain:   +Pain per flow sheets    Skin Integrity:  Sandro 12  Stage II sacrum, pressure ulcer, Skin Tear Noted   Edema: 3+Gen/Scrotum    Labs:       136  |  103  |  83<H>  ----------------------------<  144<H>  4.6   |  20<L>  |  1.50<H>    Ca    8.4      22 May 2023 12:15  Phos  5.6       Mg     2.4         TPro  5.4<L>  /  Alb  2.3<L>  /  TBili  5.8<H>  /  DBili  x   /  AST  47<H>  /  ALT  20  /  AlkPhos  66      CAPILLARY BLOOD GLUCOSE          Medications:  MEDICATIONS  (STANDING):  albumin human 25% IVPB 50 milliLiter(s) IV Intermittent every 30 minutes  ampicillin  IVPB 2 Gram(s) IV Intermittent every 6 hours  aspirin  chewable 81 milliGRAM(s) Oral daily  chlorhexidine 0.12% Liquid 15 milliLiter(s) Oral Mucosa every 12 hours  chlorhexidine 2% Cloths 1 Application(s) Topical daily  cisatracurium Infusion 3 MICROgram(s)/kG/Min (12.6 mL/Hr) IV Continuous <Continuous>  CRRT Treatment    <Continuous>  diatrizoate meglumine/diatrizoate sodium. 50 milliLiter(s) Oral once  esmolol  Infusion 25 MICROgram(s)/kG/Min (10.5 mL/Hr) IV Continuous <Continuous>  heparin   Injectable 5000 Unit(s) SubCutaneous every 8 hours  lacosamide IVPB 250 milliGRAM(s) IV Intermittent every 12 hours  meropenem  IVPB 1000 milliGRAM(s) IV Intermittent every 8 hours  pantoprazole  Injectable 40 milliGRAM(s) IV Push daily  Parenteral Nutrition - Adult 1 Each (70 mL/Hr) TPN Continuous <Continuous>  Parenteral Nutrition - Adult 1 Each (70 mL/Hr) TPN Continuous <Continuous>  propofol Infusion 10 MICROgram(s)/kG/Min (4.2 mL/Hr) IV Continuous <Continuous>  PureFlow Dialysate RFP-400 (K 2 / Ca 3) 5000 milliLiter(s) (2000 mL/Hr) CRRT <Continuous>  sodium bicarbonate  Injectable 50 milliEquivalent(s) IV Push once  sodium bicarbonate  Injectable 50 milliEquivalent(s) IV Push once  sodium chloride 0.9%. 1000 milliLiter(s) (10 mL/Hr) IV Continuous <Continuous>    MEDICATIONS  (PRN):  fentaNYL    Injectable 25 MICROGram(s) IV Push every 3 hours PRN Severe Pain (7 - 10)        6'0''  pounds+-10%   Wt 154 pounds BMI 20.9 %IBW87    Weight Change: Based on most recent EMR wt     Estimated energy needs:   current body wt used for energy calculations as pt falls within % IBW  adjust for age, post op needs, pancreatitis, pressure ulcer, ICU level of care, Vent, plan for CVVHD HD   25-30kcal/k-2094kcal/day   2.0-2.5gm/k-175gm prot/day     Subjective:    53yr old male with PMH HTN, 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 Recent admission 3/20- during which patient underwent replacement of transverse aortic arch, second stage thoracic endovascular aortic repair, aorto-axillary bypass, AV replacement (bio 23mm), and CABG x 1 (SVG-RCA) EF 75% (Ignacio, 3/20). Post op cb lactic acidosis, severe cardiogenic and vasogenic shock, TREY requiring CVVHD and temporary dialysis requirement. Recent admission - for seizure episode, were his AEDS were adjusted. Presented to [place] ED  for epigastric pain. CT exam which showed known endoleak for which pt was tx to [facility]. Patient underwent TEVAR with Dr. Pierre , LD placed prior by NSGY. Arrived intubated on propofol.  extubated. 1 unit pRBC, CTH performed for decreased LE mvmt. Improved later in day.  LD clamped and patient ambulated, plan for dc.  US showing groin seroma and hematoma. CT scan showing necrotizing pancreatitis. Zosyn started and Gen surg consulted. Recommended IR and GI consults and further evaluation with imaging. Gi believes 2/2 insults from surgeries back in March and recommended outpatient fu.  new RIJ and PA catheter placed, R pigtail placed cb pneumothorax.  gen surg signed off. Repeat imaging showing concern for possible malignancy in pancreatic tail. Radiology attending called during day to say findings likely 2/2 pancreatitis and recommended outpatient contrast study. Around 7pm patient had questionable seizure-like episode. Given 2mg ativan. Labs sig for Hb drop to ~6. TREY with oliguria. Stat CT showing [id number] hematoma, mod-large ascites with layering in pelvis. Given 4 units pRBC and sent to IR for possible intervention.  returned from Ir intubated, no arterial bleeder identified therefore no intervention performed. Hb ~8, gen surg recommending additional transfusion.  Total transfusion 7 pRBC, 2 cryo, 2 FFP, 1 plt. Was given additional blood overnight (1 pRBC). Gen surg on board.  1 unit pRBC during day for episode of hypotension. 1 episode of seizure resolved with propofol push. Vimpat dose adjusted per neurology. Given 2 FFP and 1 plt. 5/15 discussed with neurology possibilty of depakote induced pancreatitis-dose being adjusted. 5/15 no changes, depakote level adjusted, eeg neg. New line placed 5/15. Pt with concern for clinical picture of SIRS/early sepsis  - prompting imaging and initiation of presumptive Abx. CT  showed pancreatic necrosis with loculations, slightly improved LLQ hematoma without gas to suggest superinfeciton. Primacor titrated off  and lopressor dosings given with improvement in tachycardia, now on esmolol infusion. S/p Bronch , ; xray improved after white out in AM. Nephrology consulted for CVVHD.    Pt seen this AM on [facility]. EEG ongoing. RN at bedside. Pt vented: CMV mode. MAP 77. Remains ordered for cisatracurium, esmolol, propofol Infusion (rate 21ml/hr x24hrs provides ~554kcal). TPN remains ongoing. +OGT LWS, seen with Dark OP this AM, cainster nearly full. RN reports having large OP since . Noted ephrology consulted for CVVHD, team reports pt likely to begin CVVHD and asking for TPN Recs to be adjusted based on pending CVVHD plan.   Labs: Na K Mg Phos WDL, BUN/Cr 83/1.42,   (29 5/10), Amylase 239  (101 /14), Lipase 494  (82 /14), .   Please see below for nutritions recommendations. D/w team.     Previous Nutrition Diagnosis: Inadequate Energy Intake RT intake<EER AEB clears  New PES: Increased nutrients RT increased demands AEB Vent   >>on going   GOAL: Pt will meet at least 75% of nutrient needs via feasible route    Recommendations:  1. Should PN remain consider the following:   >> TPN: 185Dex, 140g AA. Will provide ~1189Kcal (1743kcal with current propofol rate of 21ml/hr), 140g protein, 1.8 GIR (Based on 70kg). Needs based on plan for CVVHD-if pt not to start on CVVHD please reconsult for adjustments.   >> Would recommend to hold lipids with TPN at this time given use of Propfol with noted increased in TG, Amylase and lipase.   >> Recommend Checking TG bi-weekly, Check Mg, Phos, K daily and POC BG Q6hrs. Cont to Check Lipase and Amylase Every Other Day. Replete Lytes PRN. Trend daily weights. Fluids and lytes per MD discretion.  >> If medically feasible, ? if able to change from propofol as sedative to a sedative which will not provide kcal from fat.   2. Pain/GI per team.  3. Labs: monitor BMP, CBC, glucose, lytes, trend renal indices, LFTs, POCT, lipids/TG, lactate, Amylase and Lipase; MAP.  4. RD to remain available for additional nutrition interventions as needed.     Education: NA.    Risk Level: High [X  ] Moderate [   ] Low [   ].

## 2023-05-22 NOTE — PROGRESS NOTE ADULT - ASSESSMENT
52 M w/ PMHx of HTN, type A aortic dissection s/p Dacron grafts, AV resuspension in 2013, CAD s/p CABG x 1 SVG to RCA (5/2013 with Dr. Medina), seizure disorder who was admitted for surgical mgmt of progression of aneurysmal disease. Recent admission 3/20-4/14 during which patient underwent replacement of transverse aortic arch, second stage TEVAR and aorto-axillary bypass, AV replacement (bio 23mm), and CABG x 1 (SVG-RCA) EF 75% (Ignacio, 3/20). Post op cb severe cardiogenic and vasogenic shock, TREY requiring CVVHD and temporary dialysis. Camr off RRT and discharged home mid April.presented to [facility] ED 4/27 for syncope vs seizure episode at home. negative neuro work up for Seizures AED dose adjusted however imaging at that rime revealed recent graft endoleak, Admitted now for surgical mgmt.   S/p Second stage thoracic endovascular aortic repair  5/5 transient LE weakness, Improved   LD clamped and Dc's 5/8  Post op course complicated by acute hemorrhagic pancreatitis   Klebsiella bacteremia -Klebsiella and Enterococcus pneumonia   A/p  No recent seizures, valproate titrated off given association with pancreatitis/ Vimpat dose increased to 250 Mg IV BID- EEG in place   Deeply sedated and paralyzed on versed/ precedex and nimbex/ propofol off i.s.o rising TG  Fever curve decreasing--> S/p paracentesis and pancreatic fluid dx drainage by IR 5/22--> follow clx   Continue with Meropenem and Amp for now ( dose adjusted for CRRT)   Oliguric TREY with severe anasarca--> Started on CVVHD/ setting adjusted for Neg 1 -2 L FB by tmr/ lytes and bicarb in good range   expecting improving function as patient is s/p LVP ~ 5 L of ascitics drained with reported pretap high bladder pressure contributing to worsening renal fx   On TPN/Sugars in normal range- AST slowly rising with worsening hyperbili- most likely related to continuous TPN/ will discuss with nutrition to switch over cyclic PN  continue NGT--> LWS   New RIJ CVC and temp HD cath 5/22   ICU ppx: Sq hep/ PPI and asp precaution     Patient remained critically ill and requires ICU care, prognosis guarded.   ATTENDING: I have personally and independently provided 30 Min of critical care services. 52 M w/ PMHx of HTN, type A aortic dissection s/p Dacron grafts, AV resuspension in 2013, CAD s/p CABG x 1 SVG to RCA (5/2013 with Dr. Medina), seizure disorder who was admitted for surgical mgmt of progression of aneurysmal disease. Recent admission 3/20-4/14 during which patient underwent replacement of transverse aortic arch, second stage TEVAR and aorto-axillary bypass, AV replacement (bio 23mm), and CABG x 1 (SVG-RCA) EF 75% ([place], 3/20). Post op cb severe cardiogenic and vasogenic shock, TREY requiring CVVHD and temporary dialysis. Camr off RRT and discharged home mid April.presented to [facility] ED 4/27 for syncope vs seizure episode at home. negative neuro work up for Seizures AED dose adjusted however imaging at that rime revealed recent graft endoleak, Admitted now for surgical mgmt.   S/p Second stage thoracic endovascular aortic repair  5/5 transient LE weakness, Improved   LD clamped and Dc's 5/8  Post op course complicated by acute hemorrhagic pancreatitis   Klebsiella bacteremia -Klebsiella and Enterococcus pneumonia   A/p  No recent seizures, valproate titrated off given association with pancreatitis/ Vimpat dose increased to 250 Mg IV BID- EEG in place   Deeply sedated and paralyzed on versed/ precedex and nimbex/ propofol off i.s.o rising TG  Klebsiella bacteremia and Klebsiella/enterococcus pneumonia/ Fever curve decreasing--> S/p paracentesis and pancreatic fluid dx drainage by IR 5/22--> follow Clx/Continue with Meropenem and Amp for now ( dose adjusted for CRRT)   Oliguric TREY with severe anasarca--> Started on CVVHD/ setting adjusted for Neg 1 -2 L FB by tmr/ lytes and bicarb in good range   expecting improving function as patient is s/p LVP ~ 5 L of ascitics drained with reported pretap high bladder pressure contributing to worsening renal fx   On TPN/Sugars in normal range- AST slowly rising with worsening hyperbili- most likely related to continuous TPN/ will discuss with nutrition to switch over cyclic PN/continue NGT--> LWS   New RIJ CVC and temp HD cath 5/22   ICU ppx: Sq hep/ PPI and asp precaution     Patient remained critically ill and requires ICU care, prognosis guarded.   ATTENDING: I have personally and independently provided 30 Min of critical care services.

## 2023-05-22 NOTE — PROCEDURE NOTE - PROCEDURE FINDINGS AND DETAILS
15mL of sanguinous fluid was aspirated from a peripancreatic fluid collection. Therapeutic paracentesis was also performed and a total of 1.3L of fluid was removed.
Celiac, SMA, splenic, and left gastric artery angiogram reveals no pseudoaneurysm or any active bleeding.

## 2023-05-22 NOTE — CONSULT NOTE ADULT - SUBJECTIVE AND OBJECTIVE BOX
52yo Male pt with PMH HTN, type A aortic dissection s/p Dacron grafts, AV resuspension in 2013, CAD s/p CABG x 1 SVG to RCA (5/2013 with Dr. Medina), seizure disorder (last episode on 7/4/22) S/P replacement of transverse aortic arch, second stage thoracic endovascular aortic repair, aorto-axillary bypass, AV replacement (bio 23mm), in APr 2023 and CABG x 1 (SVG-RCA) EF 75% ([place], 3/20) now s/p 2nd state TEVAR on 5/5 for whom surgery was consulted for finding of acute pancreatitis with large peripancreatic/perigastric fluid collections on CTA abdomen 5/8 then acute hemorrhage starting 5/12/23 requiring 11 U pRBC over last 48 hours but with negative mesenteric angiogram 5/13/23. Repeat CT showing ascites and ?pancreatic collection. IR consulted for paracentesis and LUQ collection drainage.     Clinical History: AAA    SWELLING OF EXTREMITY    No pertinent family history in first degree relatives    Handoff    MEWS Score    HTN (hypertension)    Aortic dissection    CAD (coronary artery disease)    Seizure disorder    Chronic thoracic aortic dissection    Ascites    Peripancreatic fluid collection    Chronic thoracic aortic dissection    Ascites    Peripancreatic fluid collection    Second stage thoracic endovascular aortic repair (TEVAR)    S/P aortic bifurcation bypass graft    S/P CABG x 1    [id number]_[id number]        Meds:albumin human 25% IVPB 50 milliLiter(s) IV Intermittent every 30 minutes  ampicillin  IVPB 2 Gram(s) IV Intermittent every 6 hours  aspirin  chewable 81 milliGRAM(s) Oral daily  chlorhexidine 0.12% Liquid 15 milliLiter(s) Oral Mucosa every 12 hours  chlorhexidine 2% Cloths 1 Application(s) Topical daily  cisatracurium Infusion 3 MICROgram(s)/kG/Min IV Continuous <Continuous>  CRRT Treatment    <Continuous>  esmolol  Infusion 25 MICROgram(s)/kG/Min IV Continuous <Continuous>  fentaNYL    Injectable 25 MICROGram(s) IV Push every 3 hours PRN  heparin   Injectable 5000 Unit(s) SubCutaneous every 8 hours  lacosamide IVPB 250 milliGRAM(s) IV Intermittent every 12 hours  meropenem  IVPB 1000 milliGRAM(s) IV Intermittent every 8 hours  midazolam Infusion 0.02 mG/kG/Hr IV Continuous <Continuous>  pantoprazole  Injectable 40 milliGRAM(s) IV Push daily  Parenteral Nutrition - Adult 1 Each TPN Continuous <Continuous>  Parenteral Nutrition - Adult 1 Each TPN Continuous <Continuous>  propofol Infusion 10 MICROgram(s)/kG/Min IV Continuous <Continuous>  PureFlow Dialysate RFP-400 (K 2 / Ca 3) 5000 milliLiter(s) CRRT <Continuous>  sodium chloride 0.9%. 1000 milliLiter(s) IV Continuous <Continuous>      Allergies:No Known Allergies        Labs:                           9.5    40.00 )-----------( 249      ( 22 May 2023 12:15 )             28.7     PT/INR - ( 22 May 2023 12:15 )   PT: 14.9 sec;   INR: 1.25          PTT - ( 22 May 2023 12:15 )  PTT:33.5 sec  05-22    136  |  103  |  83<H>  ----------------------------<  144<H>  4.6   |  20<L>  |  1.50<H>    Ca    8.4      22 May 2023 12:15  Phos  5.6     05-22  Mg     2.4     05-22    TPro  5.4<L>  /  Alb  2.3<L>  /  TBili  5.8<H>  /  DBili  x   /  AST  47<H>  /  ALT  20  /  AlkPhos  66  05-22           Imaging Findings:  Persistent large ascites likely containing hemorrhagic component. No free air.

## 2023-05-22 NOTE — PROGRESS NOTE ADULT - ASSESSMENT
This is a 52yo Male pt with PMH HTN, type A aortic dissection s/p Dacron grafts, AV resuspension in 2013, CAD s/p CABG x 1 SVG to RCA (5/2013 with Dr. Medina), seizure disorder (last episode on 7/4/22) S/P replacement of transverse aortic arch, second stage thoracic endovascular aortic repair, aorto-axillary bypass, AV replacement (bio 23mm), in APr 2023 and CABG x 1 (SVG-RCA) EF 75% (Ignacio, 3/20) now s/p 2nd state TEVAR on 5/5 for whom surgery was consulted for finding of acute pancreatitis with large peripancreatic/perigastric fluid collections on CTA abdomen 5/8 then acute hemorrhage starting 5/12/23 requiring 11 U pRBC over last 48 hours but with negative mesenteric angiogram 5/13/23 for whom hepatobiliary surgery was reconsulted for possible hemorrhagic pancreatitis.    - Monitor pancreatic necrosis without evident superinfection on CT scan 5/18 (ie, gas locules)  - Would keep NPO with TPN support given bilious NGT output.  - May consider repeat imaging in next few days, pending further discussion.  - Trend H&H regularly.   - Appreciate excellent care per primary team. Hepatobiliary surgery (Team 1C) continuing to follow.

## 2023-05-22 NOTE — CONSULT NOTE ADULT - SUBJECTIVE AND OBJECTIVE BOX
Patient is a 53y old  Male who presents with a chief complaint of endoleak, abdominal pain (22 May 2023 10:44)      HPI:  53 YO Male, current every day marijuana use, w/ PMHx of HTN, type A aortic dissection s/p Dacron grafts, AV resuspension in , CAD s/p CABG x 1 SVG to RCA (2013 with Dr. Medina), seizure disorder (last episode on 22) who was admitted for surgical mgmt of progression of aneurysmal disease. Recent admission 3/20- during which patient underwent replacement of transverse aortic arch, second stage thoracic endovascular aortic repair, aorto-axillary bypass, AV replacement (bio 23mm), and CABG x 1 (SVG-RCA) EF 75% (Ignacio, 3/20). Post op cb lactic acidosis, severe cardiogenic and vasogenic shock, TREY requiring CVVHD and temporary dialysis requirement. Patient presented to [facility] ED  for syncope vs seizure episode at home, falling onto back of head. Nuerological workup proformed during that visit revealed a negative EEG, but given clinical picture of seizures, pt started on vimpat and depakote. Imaging preformed during that admission did no require acute surgical intervention on endoleak.   Pt presented to [facility] ED last night complaning of worsening epigastric pain. CTAP revealed continued endoleak. Sent to [facility] for further evaluation. Pt will be taken to the OR with Dr. Pierre this morning for a completion TEVAR. Pt consented, OR aware.         (05 May 2023 09:41)   No NSAID use. Nephrology consulted for elevated creatinine.     PAST MEDICAL & SURGICAL HISTORY:  HTN (hypertension)      Aortic dissection      CAD (coronary artery disease)      Seizure disorder      S/P aortic bifurcation bypass graft      S/P CABG x 1            Allergies:  No Known Allergies      Home Medications:   acetaminophen 325 mg oral tablet: 2 tab(s) orally every 6 hours (23 @ 09:59)  divalproex sodium 500 mg oral tablet, extended release: 2 tab(s) orally every 12 hours (23 @ 09:59)      Hospital Medications:   MEDICATIONS  (STANDING):  ampicillin  IVPB 2 Gram(s) IV Intermittent every 6 hours  aspirin  chewable 81 milliGRAM(s) Oral daily  chlorhexidine 0.12% Liquid 15 milliLiter(s) Oral Mucosa every 12 hours  chlorhexidine 2% Cloths 1 Application(s) Topical daily  cisatracurium Infusion 3 MICROgram(s)/kG/Min (12.6 mL/Hr) IV Continuous <Continuous>  esmolol  Infusion 25 MICROgram(s)/kG/Min (10.5 mL/Hr) IV Continuous <Continuous>  heparin   Injectable 5000 Unit(s) SubCutaneous every 8 hours  lacosamide IVPB 250 milliGRAM(s) IV Intermittent every 12 hours  meropenem  IVPB 1000 milliGRAM(s) IV Intermittent every 8 hours  pantoprazole  Injectable 40 milliGRAM(s) IV Push daily  Parenteral Nutrition - Adult 1 Each (70 mL/Hr) TPN Continuous <Continuous>  Parenteral Nutrition - Adult 1 Each (70 mL/Hr) TPN Continuous <Continuous>  propofol Infusion 10 MICROgram(s)/kG/Min (4.2 mL/Hr) IV Continuous <Continuous>  sodium chloride 0.9%. 1000 milliLiter(s) (10 mL/Hr) IV Continuous <Continuous>      SOCIAL HISTORY:  Denies ETOh, Smoking,     Family History:  FAMILY HISTORY:  No pertinent family history in first degree relatives          VITALS:  T(F): 101.5 (23 @ 11:00), Max: 102 (23 @ 08:00)  HR: 111 (23 @ 11:28)  BP: 139/85 (23 @ 11:28)  RR: 25 (23 @ 11:28)  SpO2: 98% (23 @ 11:28)  Wt(kg): --     @ 07:01  -   @ 07:00  --------------------------------------------------------  IN: 3612.7 mL / OUT: 1470 mL / NET: 2142.7 mL     @ 07:01  -   @ 11:49  --------------------------------------------------------  IN: 625.3 mL / OUT: 95 mL / NET: 530.3 mL      Height (cm): 182.9 ( @ :28)  Weight (kg): 70 ( @ :)  BMI (kg/m2): 20.9 ( @ :)  BSA (m2): 1.91 ( @ :)  CAPILLARY BLOOD GLUCOSE          Review of Systems:  CONSTITUTIONAL: No fever or chills.  RESPIRATORY: No shortness of breath, cough  CARDIOVASCULAR: No Chest pain, shortness of breath, palpitations  GASTROINTESTINAL: No abdominal pain, nausea, vomiting, diarrhea  GENITOURINARY: No urinary frequency, gross hematuria, dysuria  NEUROLOGICAL: No headache, weakness  SKIN: No rash or skin lesion  MUSCULOSKELETAL: No swelling  Psych: Denies suicidal or homicidal ideation    PHYSICAL EXAM:  GENERAL: Alert, awake, oriented x3   HEENT: IVAN, EOMI, neck supple, no JVP  CHEST/LUNG: Bilateral clear breath sounds  HEART: Regular rate and rhythm, no murmur, no gallops, no rub   ABDOMEN: Soft, nontender, non distended  : No flank or supra pubic tenderness.  EXTREMITIES: no pedal edema  Neurology: AAOx3, no focal neurological deficit  SKIN: No rash or skin lesion   ACCESS: LUE AVF w/bruit and thrill     LABS:      136  |  102  |  83<H>  ----------------------------<  154<H>  4.4   |  19<L>  |  1.42<H>    Ca    9.0      22 May 2023 07:51  Phos  4.5       Mg     2.3         TPro  6.1  /  Alb  2.6<L>  /  TBili  5.6<H>  /  DBili      /  AST  42<H>  /  ALT  18  /  AlkPhos  72      Creatinine Trend: 1.42 <--, 1.24 <--, 1.13 <--, 1.05 <--, 1.13 <--, 1.00 <--, 1.11 <--, 1.12 <--, 1.24 <--, 1.24 <--, 1.28 <--, 1.27 <--, 1.28 <--, 1.27 <--, 1.33 <--, 1.20 <--, 1.39 <--, 1.50 <--, 1.54 <--, 1.71 <--, 1.93 <--, 2.03 <--, 2.12 <--                        9.9    33.57 )-----------( 258      ( 22 May 2023 07:51 )             29.7     Urine Studies:  Urinalysis Basic - ( 17 May 2023 20:04 )    Color: Yellow / Appearance: Clear / S.020 / pH:   Gluc:  / Ketone: NEGATIVE  / Bili: Negative / Urobili: 0.2 E.U./dL   Blood:  / Protein: 100 mg/dL / Nitrite: NEGATIVE   Leuk Esterase: NEGATIVE / RBC: 5-10 /HPF / WBC < 5 /HPF   Sq Epi:  / Non Sq Epi:  / Bacteria: Present /HPF            52M presented w/elevated BUN/Cr w/o known renal history. Nephrology consulted for elevated creatinine.     Assessment/Plan:   #non-oliguric TREY on CKD (pending r/o pre-renal vs post-renal)  #hyponatremia, hypovolemic   UOP 500cc/2h   UA w/protein and blood   uPCR pending   unclear baseline creatinine  etiology of TREY includes:   ischemic ATN unlikely as no hx of hypotensive epsiodes   toxic ATN unlikely as no known offending agents (gentamicin, chemotherapeutics)   contrast induced nephropathy unlikely as no known contrast exposure   rhabdomyolysis unlikely given no hx of trauma though pending CKMB panel   glomerulonephritis likely as UA w/protein and blood   AIN unlikely as no hx of PPI or antibiotics   paraproteinemia pending serum immunoelectropheresis (SPEP, immunofixation, free light chains)   renal infarct unlikely as rare   no NSAID use   most likely pre-renal, will pursue advanced work-up if initial work-up negative     Recommend:   STAT bladder scan r/o obstruction   continue with IVF NS @100cc/h   add-on CPK/CKMB panel  urine and CBC % eosinophils   urine protein-creatinine ratio   daily CXR   daily BMP + urine lytes   renal sono  strict I/Os   renal diet     Thank you for the opportunity to participate in the care of your patient. The nephrology service remains available to assist with any questions or concerns. Please feel free to reach us by paging the on-call nephrology fellow for urgent issues or as below.     Angel Jamison M.D.   PGY-5, Nephrology Fellow   C: [phone]   P: [phone]  Patient is a 53y old  Male who presents with a chief complaint of endoleak, abdominal pain (22 May 2023 10:44)      HPI:  51 YO Male, HTN aortic dissection previous admission with severe cardiogenic shock TREY requiring CVVHD presented o the ED with worsening epigastric pain CTA/P revealed continued endoleak hospital course complicated by necrotic pancreatitis elevated leukocytosis on meropenem currently intubated sedated on on esmolol pending CT scan and IR drainage for peritoneal fluid /85 K 4,4 bicarb 19  uop 1.2L/24 hours nephrology consulted for CVVHD             (05 May 2023 09:41)   No NSAID use. Nephrology consulted for elevated creatinine.     PAST MEDICAL & SURGICAL HISTORY:  HTN (hypertension)      Aortic dissection      CAD (coronary artery disease)      Seizure disorder      S/P aortic bifurcation bypass graft      S/P CABG x 1            Allergies:  No Known Allergies      Home Medications:   acetaminophen 325 mg oral tablet: 2 tab(s) orally every 6 hours (23 @ 09:59)  divalproex sodium 500 mg oral tablet, extended release: 2 tab(s) orally every 12 hours (23 @ 09:59)      Hospital Medications:   MEDICATIONS  (STANDING):  ampicillin  IVPB 2 Gram(s) IV Intermittent every 6 hours  aspirin  chewable 81 milliGRAM(s) Oral daily  chlorhexidine 0.12% Liquid 15 milliLiter(s) Oral Mucosa every 12 hours  chlorhexidine 2% Cloths 1 Application(s) Topical daily  cisatracurium Infusion 3 MICROgram(s)/kG/Min (12.6 mL/Hr) IV Continuous <Continuous>  esmolol  Infusion 25 MICROgram(s)/kG/Min (10.5 mL/Hr) IV Continuous <Continuous>  heparin   Injectable 5000 Unit(s) SubCutaneous every 8 hours  lacosamide IVPB 250 milliGRAM(s) IV Intermittent every 12 hours  meropenem  IVPB 1000 milliGRAM(s) IV Intermittent every 8 hours  pantoprazole  Injectable 40 milliGRAM(s) IV Push daily  Parenteral Nutrition - Adult 1 Each (70 mL/Hr) TPN Continuous <Continuous>  Parenteral Nutrition - Adult 1 Each (70 mL/Hr) TPN Continuous <Continuous>  propofol Infusion 10 MICROgram(s)/kG/Min (4.2 mL/Hr) IV Continuous <Continuous>  sodium chloride 0.9%. 1000 milliLiter(s) (10 mL/Hr) IV Continuous <Continuous>      SOCIAL HISTORY:  Denies ETOh, Smoking,     Family History:  FAMILY HISTORY:  No pertinent family history in first degree relatives          VITALS:  T(F): 101.5 (23 @ 11:00), Max: 102 (23 @ 08:00)  HR: 111 (23:28)  BP: 139/85 (23:28)  RR: 25 (23:)  SpO2: 98% (23:)  Wt(kg): --     @ 07:01  -   07:00  --------------------------------------------------------  IN: 3612.7 mL / OUT: 1470 mL / NET: 2142.7 mL     @ 07:01  -   11:49  --------------------------------------------------------  IN: 625.3 mL / OUT: 95 mL / NET: 530.3 mL      Height (cm): 182.9 (:28)  Weight (kg): 70 (:)  BMI (kg/m2): 20.9 (:)  BSA (m2): 1.91 (:)  CAPILLARY BLOOD GLUCOSE          Review of Systems:  unable to participate    PHYSICAL EXAM:  GENERAL: intubated sedated   CHEST/LUNG: mechanical breath sounds BL  HEART: Regular rate and rhythm, no murmur, no gallops, no rub   ABDOMEN: firm; distended   EXTREMITIES: + pitting edema BL LE  SKIN: No rash or skin lesion       LABS:      136  |  102  |  83<H>  ----------------------------<  154<H>  4.4   |  19<L>  |  1.42<H>    Ca    9.0      22 May 2023 07:51  Phos  4.5     -  Mg     2.3         TPro  6.1  /  Alb  2.6<L>  /  TBili  5.6<H>  /  DBili      /  AST  42<H>  /  ALT  18  /  AlkPhos  72      Creatinine Trend: 1.42 <--, 1.24 <--, 1.13 <--, 1.05 <--, 1.13 <--, 1.00 <--, 1.11 <--, 1.12 <--, 1.24 <--, 1.24 <--, 1.28 <--, 1.27 <--, 1.28 <--, 1.27 <--, 1.33 <--, 1.20 <--, 1.39 <--, 1.50 <--, 1.54 <--, 1.71 <--, 1.93 <--, 2.03 <--, 2.12 <--                        9.9    33.57 )-----------( 258      ( 22 May 2023 07:51 )             29.7     Urine Studies:  Urinalysis Basic - ( 17 May 2023 20:04 )    Color: Yellow / Appearance: Clear / S.020 / pH:   Gluc:  / Ketone: NEGATIVE  / Bili: Negative / Urobili: 0.2 E.U./dL   Blood:  / Protein: 100 mg/dL / Nitrite: NEGATIVE   Leuk Esterase: NEGATIVE / RBC: 5-10 /HPF / WBC < 5 /HPF   Sq Epi:  / Non Sq Epi:  / Bacteria: Present /HPF             Patient is a 53y old  Male who presents with a chief complaint of endoleak, abdominal pain (22 May 2023 10:44)      HPI:  53 YO Male, HTN aortic dissection previous admission with severe cardiogenic shock TREY requiring CVVHD presented o the ED with worsening epigastric pain CTA/P revealed continued endoleak hospital course complicated by necrotic pancreatitis elevated leukocytosis on meropenem currently intubated sedated on on esmolol pending CT scan and IR drainage for peritoneal fluid /85 K 4,4 bicarb 19  uop 1.2L/24 hours nephrology consulted for CVVHD             (05 May 2023 09:41)   No NSAID use. Nephrology consulted for elevated creatinine.     PAST MEDICAL & SURGICAL HISTORY:  HTN (hypertension)      Aortic dissection      CAD (coronary artery disease)      Seizure disorder      S/P aortic bifurcation bypass graft      S/P CABG x 1            Allergies:  No Known Allergies      Home Medications:   acetaminophen 325 mg oral tablet: 2 tab(s) orally every 6 hours (23 @ 09:59)  divalproex sodium 500 mg oral tablet, extended release: 2 tab(s) orally every 12 hours (23 @ 09:59)      Hospital Medications:   MEDICATIONS  (STANDING):  ampicillin  IVPB 2 Gram(s) IV Intermittent every 6 hours  aspirin  chewable 81 milliGRAM(s) Oral daily  chlorhexidine 0.12% Liquid 15 milliLiter(s) Oral Mucosa every 12 hours  chlorhexidine 2% Cloths 1 Application(s) Topical daily  cisatracurium Infusion 3 MICROgram(s)/kG/Min (12.6 mL/Hr) IV Continuous <Continuous>  esmolol  Infusion 25 MICROgram(s)/kG/Min (10.5 mL/Hr) IV Continuous <Continuous>  heparin   Injectable 5000 Unit(s) SubCutaneous every 8 hours  lacosamide IVPB 250 milliGRAM(s) IV Intermittent every 12 hours  meropenem  IVPB 1000 milliGRAM(s) IV Intermittent every 8 hours  pantoprazole  Injectable 40 milliGRAM(s) IV Push daily  Parenteral Nutrition - Adult 1 Each (70 mL/Hr) TPN Continuous <Continuous>  Parenteral Nutrition - Adult 1 Each (70 mL/Hr) TPN Continuous <Continuous>  propofol Infusion 10 MICROgram(s)/kG/Min (4.2 mL/Hr) IV Continuous <Continuous>  sodium chloride 0.9%. 1000 milliLiter(s) (10 mL/Hr) IV Continuous <Continuous>      SOCIAL HISTORY:  Denies ETOh, Smoking,     Family History:  FAMILY HISTORY:  No pertinent family history in first degree relatives          VITALS:  T(F): 101.5 (23 @ 11:00), Max: 102 (23 @ 08:00)  HR: 111 (23:28)  BP: 139/85 (23:28)  RR: 25 (23:)  SpO2: 98% (23:)  Wt(kg): --     @ 07:01  -   07:00  --------------------------------------------------------  IN: 3612.7 mL / OUT: 1470 mL / NET: 2142.7 mL     @ 07:01  -   11:49  --------------------------------------------------------  IN: 625.3 mL / OUT: 95 mL / NET: 530.3 mL      Height (cm): 182.9 (:28)  Weight (kg): 70 (:)  BMI (kg/m2): 20.9 (:)  BSA (m2): 1.91 (:)  CAPILLARY BLOOD GLUCOSE          Review of Systems:  unable to participate    PHYSICAL EXAM:  GENERAL: intubated sedated   CHEST/LUNG: mechanical breath sounds BL  HEART: Regular rate and rhythm, no murmur, no gallops, no rub   ABDOMEN: firm; distended   EXTREMITIES: + pitting edema BL LE  SKIN: No rash or skin lesion   Neuro: intubated, sedated    LABS:      136  |  102  |  83<H>  ----------------------------<  154<H>  4.4   |  19<L>  |  1.42<H>    Ca    9.0      22 May 2023 07:51  Phos  4.5     -  Mg     2.3         TPro  6.1  /  Alb  2.6<L>  /  TBili  5.6<H>  /  DBili      /  AST  42<H>  /  ALT  18  /  AlkPhos  72      Creatinine Trend: 1.42 <--, 1.24 <--, 1.13 <--, 1.05 <--, 1.13 <--, 1.00 <--, 1.11 <--, 1.12 <--, 1.24 <--, 1.24 <--, 1.28 <--, 1.27 <--, 1.28 <--, 1.27 <--, 1.33 <--, 1.20 <--, 1.39 <--, 1.50 <--, 1.54 <--, 1.71 <--, 1.93 <--, 2.03 <--, 2.12 <--                        9.9    33.57 )-----------( 258      ( 22 May 2023 07:51 )             29.7     Urine Studies:  Urinalysis Basic - ( 17 May 2023 20:04 )    Color: Yellow / Appearance: Clear / S.020 / pH:   Gluc:  / Ketone: NEGATIVE  / Bili: Negative / Urobili: 0.2 E.U./dL   Blood:  / Protein: 100 mg/dL / Nitrite: NEGATIVE   Leuk Esterase: NEGATIVE / RBC: 5-10 /HPF / WBC < 5 /HPF   Sq Epi:  / Non Sq Epi:  / Bacteria: Present /HPF

## 2023-05-22 NOTE — PROCEDURE NOTE - GENERAL PROCEDURE NAME
Celiac, SMA, splenic, and left gastric artery angiogram
Lumbar drain removal
Paracentesis and drainage of peripancreatic fluid collection

## 2023-05-22 NOTE — PROGRESS NOTE ADULT - SUBJECTIVE AND OBJECTIVE BOX
IDENTIFICATION: This is a 54yo Male pt with PMH HTN, type A aortic dissection s/p Dacron grafts, AV resuspension in 2013, CAD s/p CABG x 1 SVG to RCA (5/2013 with Dr. Medina), seizure disorder (last episode on 7/4/22) S/P replacement of transverse aortic arch, second stage thoracic endovascular aortic repair, aorto-axillary bypass, AV replacement (bio 23mm), in APr 2023 and CABG x 1 (SVG-RCA) EF 75% (Ignacio, 3/20) now s/p 2nd state TEVAR on 5/5 for whom surgery was consulted for finding of acute pancreatitis with large peripancreatic/perigastric fluid collections on CTA abdomen 5/8 then acute hemorrhage starting 5/12/23 requiring 11 U pRBC over last 48 hours but with negative mesenteric angiogram 5/13/23 for whom hepatobiliary surgery was reconsulted for possible hemorrhagic pancreatitis.    EVENTS:   - Remains intubated/sedated  - H&H stable.  - ABx Meropenem & ampicillin (from [facility]) . SputCx & BCx with Klebsiella 5/17. Repeat BALCx 5/19 with Enterococcus cloacae & E faecalis, Klebsiella but suspected ? gastric contaminant. Repeat Cx 5/20 & 5/21 NGTD.  - CT 5/18 showed pancreatic necrosis with loculations, slightly improved LLQ hematoma without gas to suggest superinfeciton; bilateral pulmonary consolidations.    SUBJECTIVE:   - Sedated    MEDICATIONS  (STANDING):  ampicillin  IVPB 2 Gram(s) IV Intermittent every 6 hours  aspirin  chewable 81 milliGRAM(s) Oral daily  chlorhexidine 0.12% Liquid 15 milliLiter(s) Oral Mucosa every 12 hours  chlorhexidine 2% Cloths 1 Application(s) Topical daily  esmolol  Infusion 25 MICROgram(s)/kG/Min (10.5 mL/Hr) IV Continuous <Continuous>  fat emulsion (Fish Oil and Plant Based) 20% Infusion 0.7143 Gm/kG/Day (20.8 mL/Hr) IV Continuous <Continuous>  heparin   Injectable 5000 Unit(s) SubCutaneous every 8 hours  lacosamide IVPB 250 milliGRAM(s) IV Intermittent every 12 hours  meropenem  IVPB 1000 milliGRAM(s) IV Intermittent every 8 hours  pantoprazole  Injectable 40 milliGRAM(s) IV Push daily  Parenteral Nutrition - Adult 1 Each (70 mL/Hr) TPN Continuous <Continuous>  propofol Infusion 10 MICROgram(s)/kG/Min (4.2 mL/Hr) IV Continuous <Continuous>  sodium chloride 0.9%. 1000 milliLiter(s) (10 mL/Hr) IV Continuous <Continuous>    MEDICATIONS  (PRN):  fentaNYL    Injectable 25 MICROGram(s) IV Push every 3 hours PRN Severe Pain (7 - 10)      Vital Signs Last 24 Hrs  T(C): 36.6 (22 May 2023 05:41), Max: 36.6 (22 May 2023 05:41)  T(F): 97.8 (22 May 2023 05:41), Max: 97.8 (22 May 2023 05:41)  HR: 104 (22 May 2023 07:00) (99 - 137)  BP: 139/85 (21 May 2023 11:00) (139/85 - 139/85)  BP(mean): 106 (21 May 2023 11:00) (106 - 106)  RR: 31 (22 May 2023 07:00) (24 - 31)  SpO2: 100% (22 May 2023 07:00) (99% - 100%)    Parameters below as of 22 May 2023 07:00  Patient On (Oxygen Delivery Method): ventilator    O2 Concentration (%): 50  Neurologic: Sedated  CV: Normal rate, regular rhythm  Pulm: Breathing comfortably on MV with equal chest rise.  Abd: Mildly distended; mild reaction to palpation despite sedation  : No Cantrell  Skin: No rashes  Extremities: No edema.  Psychiatric: Sedated    I&O's Detail    21 May 2023 07:01  -  22 May 2023 07:00  --------------------------------------------------------  IN:    Dexmedetomidine: 3.5 mL    Esmolol: 294 mL    Fat Emulsion (Fish Oil &amp; Plant Based) 20% Infusion: 291.2 mL    IV PiggyBack: 50 mL    IV PiggyBack: 500 mL    Other (mL): 50 mL    Propofol: 504 mL    sodium chloride 0.9%: 240 mL    TPN (Total Parenteral Nutrition): 1680 mL  Total IN: 3612.7 mL    OUT:    Indwelling Catheter - Urethral (mL): 1220 mL    Nasogastric/Oral tube (mL): 200 mL    Other (mL): 50 mL  Total OUT: 1470 mL    Total NET: 2142.7 mL          LABS:                        10.5   31.57 )-----------( 252      ( 22 May 2023 02:10 )             31.3     05-22    136  |  103  |  76<H>  ----------------------------<  152<H>  4.4   |  19<L>  |  1.24    Ca    9.0      22 May 2023 02:10  Phos  4.3     05-22  Mg     2.4     05-22    TPro  5.9<L>  /  Alb  2.6<L>  /  TBili  5.4<H>  /  DBili  x   /  AST  32  /  ALT  15  /  AlkPhos  68  05-22    PT/INR - ( 22 May 2023 02:10 )   PT: 16.0 sec;   INR: 1.34          PTT - ( 21 May 2023 21:39 )  PTT:33.6 sec      RADIOLOGY & ADDITIONAL STUDIES:

## 2023-05-23 LAB
ALBUMIN SERPL ELPH-MCNC: 3 G/DL — LOW (ref 3.3–5)
ALBUMIN SERPL ELPH-MCNC: 3.1 G/DL — LOW (ref 3.3–5)
ALBUMIN SERPL ELPH-MCNC: 3.4 G/DL — SIGNIFICANT CHANGE UP (ref 3.3–5)
ALP SERPL-CCNC: 60 U/L — SIGNIFICANT CHANGE UP (ref 40–120)
ALP SERPL-CCNC: 67 U/L — SIGNIFICANT CHANGE UP (ref 40–120)
ALP SERPL-CCNC: 69 U/L — SIGNIFICANT CHANGE UP (ref 40–120)
ALT FLD-CCNC: 23 U/L — SIGNIFICANT CHANGE UP (ref 10–45)
ALT FLD-CCNC: 25 U/L — SIGNIFICANT CHANGE UP (ref 10–45)
ALT FLD-CCNC: 26 U/L — SIGNIFICANT CHANGE UP (ref 10–45)
ANION GAP SERPL CALC-SCNC: 11 MMOL/L — SIGNIFICANT CHANGE UP (ref 5–17)
ANION GAP SERPL CALC-SCNC: 12 MMOL/L — SIGNIFICANT CHANGE UP (ref 5–17)
ANION GAP SERPL CALC-SCNC: 12 MMOL/L — SIGNIFICANT CHANGE UP (ref 5–17)
APTT BLD: 33.4 SEC — SIGNIFICANT CHANGE UP (ref 27.5–35.5)
APTT BLD: 34.5 SEC — SIGNIFICANT CHANGE UP (ref 27.5–35.5)
APTT BLD: 35.9 SEC — HIGH (ref 27.5–35.5)
AST SERPL-CCNC: 49 U/L — HIGH (ref 10–40)
AST SERPL-CCNC: 58 U/L — HIGH (ref 10–40)
AST SERPL-CCNC: 59 U/L — HIGH (ref 10–40)
BASE EXCESS BLDV CALC-SCNC: -3.6 MMOL/L — LOW (ref -2–3)
BASE EXCESS BLDV CALC-SCNC: -4.1 MMOL/L — LOW (ref -2–3)
BILIRUB DIRECT SERPL-MCNC: 8.9 MG/DL — HIGH (ref 0–0.3)
BILIRUB SERPL-MCNC: 10.5 MG/DL — HIGH (ref 0.2–1.2)
BILIRUB SERPL-MCNC: 7.1 MG/DL — HIGH (ref 0.2–1.2)
BILIRUB SERPL-MCNC: 7.8 MG/DL — HIGH (ref 0.2–1.2)
BILIRUB SERPL-MCNC: 9.8 MG/DL — HIGH (ref 0.2–1.2)
BLD GP AB SCN SERPL QL: NEGATIVE — SIGNIFICANT CHANGE UP
BUN SERPL-MCNC: 68 MG/DL — HIGH (ref 7–23)
BUN SERPL-MCNC: 73 MG/DL — HIGH (ref 7–23)
BUN SERPL-MCNC: 77 MG/DL — HIGH (ref 7–23)
C DIFF BY PCR RESULT: NEGATIVE — SIGNIFICANT CHANGE UP
C DIFF GDH STL QL: SIGNIFICANT CHANGE UP
C DIFF GDH STL QL: SIGNIFICANT CHANGE UP
CA-I SERPL-SCNC: 1.26 MMOL/L — SIGNIFICANT CHANGE UP (ref 1.15–1.33)
CALCIUM SERPL-MCNC: 8.8 MG/DL — SIGNIFICANT CHANGE UP (ref 8.4–10.5)
CALCIUM SERPL-MCNC: 8.8 MG/DL — SIGNIFICANT CHANGE UP (ref 8.4–10.5)
CALCIUM SERPL-MCNC: 8.9 MG/DL — SIGNIFICANT CHANGE UP (ref 8.4–10.5)
CHLORIDE SERPL-SCNC: 100 MMOL/L — SIGNIFICANT CHANGE UP (ref 96–108)
CHLORIDE SERPL-SCNC: 101 MMOL/L — SIGNIFICANT CHANGE UP (ref 96–108)
CHLORIDE SERPL-SCNC: 103 MMOL/L — SIGNIFICANT CHANGE UP (ref 96–108)
CO2 BLDV-SCNC: 23.6 MMOL/L — SIGNIFICANT CHANGE UP (ref 22–26)
CO2 BLDV-SCNC: 24.5 MMOL/L — SIGNIFICANT CHANGE UP (ref 22–26)
CO2 SERPL-SCNC: 23 MMOL/L — SIGNIFICANT CHANGE UP (ref 22–31)
CORTIS AM PEAK SERPL-MCNC: 62.63 UG/DL — HIGH (ref 6.02–18.4)
CORTIS F PM SERPL-MCNC: 63.19 UG/DL — HIGH (ref 2.68–10.5)
CREAT SERPL-MCNC: 1.17 MG/DL — SIGNIFICANT CHANGE UP (ref 0.5–1.3)
CREAT SERPL-MCNC: 1.23 MG/DL — SIGNIFICANT CHANGE UP (ref 0.5–1.3)
CREAT SERPL-MCNC: 1.41 MG/DL — HIGH (ref 0.5–1.3)
CULTURE RESULTS: SIGNIFICANT CHANGE UP
EGFR: 59 ML/MIN/1.73M2 — LOW
EGFR: 70 ML/MIN/1.73M2 — SIGNIFICANT CHANGE UP
EGFR: 74 ML/MIN/1.73M2 — SIGNIFICANT CHANGE UP
GAS PNL BLDA: SIGNIFICANT CHANGE UP
GAS PNL BLDV: 134 MMOL/L — LOW (ref 136–145)
GAS PNL BLDV: SIGNIFICANT CHANGE UP
GAS PNL BLDV: SIGNIFICANT CHANGE UP
GLUCOSE BLDC GLUCOMTR-MCNC: 198 MG/DL — HIGH (ref 70–99)
GLUCOSE BLDC GLUCOMTR-MCNC: 207 MG/DL — HIGH (ref 70–99)
GLUCOSE SERPL-MCNC: 140 MG/DL — HIGH (ref 70–99)
GLUCOSE SERPL-MCNC: 155 MG/DL — HIGH (ref 70–99)
GLUCOSE SERPL-MCNC: 215 MG/DL — HIGH (ref 70–99)
GRAM STN FLD: SIGNIFICANT CHANGE UP
HCO3 BLDV-SCNC: 22 MMOL/L — SIGNIFICANT CHANGE UP (ref 22–29)
HCO3 BLDV-SCNC: 23 MMOL/L — SIGNIFICANT CHANGE UP (ref 22–29)
HCT VFR BLD CALC: 22.4 % — LOW (ref 39–50)
HCT VFR BLD CALC: 24.2 % — LOW (ref 39–50)
HCT VFR BLD CALC: 27.4 % — LOW (ref 39–50)
HGB BLD-MCNC: 7.5 G/DL — LOW (ref 13–17)
HGB BLD-MCNC: 8.2 G/DL — LOW (ref 13–17)
HGB BLD-MCNC: 9.2 G/DL — LOW (ref 13–17)
INR BLD: 1.23 — HIGH (ref 0.88–1.16)
INR BLD: 1.31 — HIGH (ref 0.88–1.16)
INR BLD: 1.39 — HIGH (ref 0.88–1.16)
LACTATE SERPL-SCNC: 1 MMOL/L — SIGNIFICANT CHANGE UP (ref 0.5–2)
LACTATE SERPL-SCNC: 1.2 MMOL/L — SIGNIFICANT CHANGE UP (ref 0.5–2)
LACTATE SERPL-SCNC: 1.5 MMOL/L — SIGNIFICANT CHANGE UP (ref 0.5–2)
MAGNESIUM SERPL-MCNC: 2.1 MG/DL — SIGNIFICANT CHANGE UP (ref 1.6–2.6)
MAGNESIUM SERPL-MCNC: 2.1 MG/DL — SIGNIFICANT CHANGE UP (ref 1.6–2.6)
MAGNESIUM SERPL-MCNC: 2.2 MG/DL — SIGNIFICANT CHANGE UP (ref 1.6–2.6)
MCHC RBC-ENTMCNC: 30.8 PG — SIGNIFICANT CHANGE UP (ref 27–34)
MCHC RBC-ENTMCNC: 31.1 PG — SIGNIFICANT CHANGE UP (ref 27–34)
MCHC RBC-ENTMCNC: 31.3 PG — SIGNIFICANT CHANGE UP (ref 27–34)
MCHC RBC-ENTMCNC: 33.5 GM/DL — SIGNIFICANT CHANGE UP (ref 32–36)
MCHC RBC-ENTMCNC: 33.6 GM/DL — SIGNIFICANT CHANGE UP (ref 32–36)
MCHC RBC-ENTMCNC: 33.9 GM/DL — SIGNIFICANT CHANGE UP (ref 32–36)
MCV RBC AUTO: 91.6 FL — SIGNIFICANT CHANGE UP (ref 80–100)
MCV RBC AUTO: 91.7 FL — SIGNIFICANT CHANGE UP (ref 80–100)
MCV RBC AUTO: 93.3 FL — SIGNIFICANT CHANGE UP (ref 80–100)
NRBC # BLD: 0 /100 WBCS — SIGNIFICANT CHANGE UP (ref 0–0)
ORGANISM # SPEC MICROSCOPIC CNT: SIGNIFICANT CHANGE UP
ORGANISM # SPEC MICROSCOPIC CNT: SIGNIFICANT CHANGE UP
PCO2 BLDV: 44 MMHG — SIGNIFICANT CHANGE UP (ref 42–55)
PCO2 BLDV: 47 MMHG — SIGNIFICANT CHANGE UP (ref 42–55)
PH BLDV: 7.3 — LOW (ref 7.32–7.43)
PH BLDV: 7.31 — LOW (ref 7.32–7.43)
PHOSPHATE SERPL-MCNC: 4.3 MG/DL — SIGNIFICANT CHANGE UP (ref 2.5–4.5)
PHOSPHATE SERPL-MCNC: 4.5 MG/DL — SIGNIFICANT CHANGE UP (ref 2.5–4.5)
PHOSPHATE SERPL-MCNC: 5.6 MG/DL — HIGH (ref 2.5–4.5)
PLATELET # BLD AUTO: 166 K/UL — SIGNIFICANT CHANGE UP (ref 150–400)
PLATELET # BLD AUTO: 169 K/UL — SIGNIFICANT CHANGE UP (ref 150–400)
PLATELET # BLD AUTO: 197 K/UL — SIGNIFICANT CHANGE UP (ref 150–400)
PO2 BLDV: 37 MMHG — SIGNIFICANT CHANGE UP (ref 25–45)
PO2 BLDV: 39 MMHG — SIGNIFICANT CHANGE UP (ref 25–45)
POTASSIUM BLDV-SCNC: 4.4 MMOL/L — SIGNIFICANT CHANGE UP (ref 3.5–5.1)
POTASSIUM SERPL-MCNC: 4.1 MMOL/L — SIGNIFICANT CHANGE UP (ref 3.5–5.3)
POTASSIUM SERPL-MCNC: 4.4 MMOL/L — SIGNIFICANT CHANGE UP (ref 3.5–5.3)
POTASSIUM SERPL-MCNC: 4.5 MMOL/L — SIGNIFICANT CHANGE UP (ref 3.5–5.3)
POTASSIUM SERPL-SCNC: 4.1 MMOL/L — SIGNIFICANT CHANGE UP (ref 3.5–5.3)
POTASSIUM SERPL-SCNC: 4.4 MMOL/L — SIGNIFICANT CHANGE UP (ref 3.5–5.3)
POTASSIUM SERPL-SCNC: 4.5 MMOL/L — SIGNIFICANT CHANGE UP (ref 3.5–5.3)
PROT SERPL-MCNC: 5.4 G/DL — LOW (ref 6–8.3)
PROT SERPL-MCNC: 5.6 G/DL — LOW (ref 6–8.3)
PROT SERPL-MCNC: 5.8 G/DL — LOW (ref 6–8.3)
PROTHROM AB SERPL-ACNC: 14.7 SEC — HIGH (ref 10.5–13.4)
PROTHROM AB SERPL-ACNC: 15.6 SEC — HIGH (ref 10.5–13.4)
PROTHROM AB SERPL-ACNC: 16.6 SEC — HIGH (ref 10.5–13.4)
RBC # BLD: 2.4 M/UL — LOW (ref 4.2–5.8)
RBC # BLD: 2.64 M/UL — LOW (ref 4.2–5.8)
RBC # BLD: 2.99 M/UL — LOW (ref 4.2–5.8)
RBC # FLD: 16.1 % — HIGH (ref 10.3–14.5)
RBC # FLD: 16.5 % — HIGH (ref 10.3–14.5)
RBC # FLD: 17 % — HIGH (ref 10.3–14.5)
RH IG SCN BLD-IMP: POSITIVE — SIGNIFICANT CHANGE UP
SAO2 % BLDV: 57.7 % — LOW (ref 67–88)
SAO2 % BLDV: 63.5 % — LOW (ref 67–88)
SODIUM SERPL-SCNC: 134 MMOL/L — LOW (ref 135–145)
SODIUM SERPL-SCNC: 136 MMOL/L — SIGNIFICANT CHANGE UP (ref 135–145)
SODIUM SERPL-SCNC: 138 MMOL/L — SIGNIFICANT CHANGE UP (ref 135–145)
SPECIMEN SOURCE: SIGNIFICANT CHANGE UP
WBC # BLD: 35.9 K/UL — HIGH (ref 3.8–10.5)
WBC # BLD: 55.55 K/UL — CRITICAL HIGH (ref 3.8–10.5)
WBC # BLD: 57.18 K/UL — CRITICAL HIGH (ref 3.8–10.5)
WBC # FLD AUTO: 35.9 K/UL — HIGH (ref 3.8–10.5)
WBC # FLD AUTO: 55.55 K/UL — CRITICAL HIGH (ref 3.8–10.5)
WBC # FLD AUTO: 57.18 K/UL — CRITICAL HIGH (ref 3.8–10.5)

## 2023-05-23 PROCEDURE — 99232 SBSQ HOSP IP/OBS MODERATE 35: CPT | Mod: GC

## 2023-05-23 PROCEDURE — 99231 SBSQ HOSP IP/OBS SF/LOW 25: CPT

## 2023-05-23 RX ORDER — CASPOFUNGIN ACETATE 7 MG/ML
INJECTION, POWDER, LYOPHILIZED, FOR SOLUTION INTRAVENOUS
Refills: 0 | Status: DISCONTINUED | OUTPATIENT
Start: 2023-05-23 | End: 2023-05-30

## 2023-05-23 RX ORDER — CASPOFUNGIN ACETATE 7 MG/ML
70 INJECTION, POWDER, LYOPHILIZED, FOR SOLUTION INTRAVENOUS ONCE
Refills: 0 | Status: COMPLETED | OUTPATIENT
Start: 2023-05-23 | End: 2023-05-23

## 2023-05-23 RX ORDER — VASOPRESSIN 20 [USP'U]/ML
0.06 INJECTION INTRAVENOUS
Qty: 40 | Refills: 0 | Status: DISCONTINUED | OUTPATIENT
Start: 2023-05-23 | End: 2023-05-24

## 2023-05-23 RX ORDER — CASPOFUNGIN ACETATE 7 MG/ML
50 INJECTION, POWDER, LYOPHILIZED, FOR SOLUTION INTRAVENOUS EVERY 24 HOURS
Refills: 0 | Status: DISCONTINUED | OUTPATIENT
Start: 2023-05-24 | End: 2023-05-30

## 2023-05-23 RX ORDER — ALBUMIN HUMAN 25 %
250 VIAL (ML) INTRAVENOUS
Refills: 0 | Status: COMPLETED | OUTPATIENT
Start: 2023-05-23 | End: 2023-05-23

## 2023-05-23 RX ORDER — NOREPINEPHRINE BITARTRATE/D5W 8 MG/250ML
0.05 PLASTIC BAG, INJECTION (ML) INTRAVENOUS
Qty: 8 | Refills: 0 | Status: DISCONTINUED | OUTPATIENT
Start: 2023-05-23 | End: 2023-05-24

## 2023-05-23 RX ORDER — ELECTROLYTE SOLUTION,INJ
1 VIAL (ML) INTRAVENOUS
Refills: 0 | Status: DISCONTINUED | OUTPATIENT
Start: 2023-05-23 | End: 2023-05-23

## 2023-05-23 RX ORDER — CASPOFUNGIN ACETATE 7 MG/ML
INJECTION, POWDER, LYOPHILIZED, FOR SOLUTION INTRAVENOUS
Refills: 0 | Status: DISCONTINUED | OUTPATIENT
Start: 2023-05-23 | End: 2023-05-23

## 2023-05-23 RX ORDER — PHENYLEPHRINE HYDROCHLORIDE 10 MG/ML
0.2 INJECTION INTRAVENOUS
Qty: 40 | Refills: 0 | Status: DISCONTINUED | OUTPATIENT
Start: 2023-05-23 | End: 2023-05-24

## 2023-05-23 RX ORDER — ALBUMIN HUMAN 25 %
250 VIAL (ML) INTRAVENOUS ONCE
Refills: 0 | Status: COMPLETED | OUTPATIENT
Start: 2023-05-23 | End: 2023-05-23

## 2023-05-23 RX ADMIN — Medication 125 MILLILITER(S): at 11:00

## 2023-05-23 RX ADMIN — Medication 216 GRAM(S): at 00:01

## 2023-05-23 RX ADMIN — Medication 125 MILLILITER(S): at 12:00

## 2023-05-23 RX ADMIN — VASOPRESSIN 9 UNIT(S)/MIN: 20 INJECTION INTRAVENOUS at 22:00

## 2023-05-23 RX ADMIN — CISATRACURIUM BESYLATE 12.6 MICROGRAM(S)/KG/MIN: 2 INJECTION INTRAVENOUS at 23:36

## 2023-05-23 RX ADMIN — MIDAZOLAM HYDROCHLORIDE 1.4 MG/KG/HR: 1 INJECTION, SOLUTION INTRAMUSCULAR; INTRAVENOUS at 23:37

## 2023-05-23 RX ADMIN — CHLORHEXIDINE GLUCONATE 15 MILLILITER(S): 213 SOLUTION TOPICAL at 06:39

## 2023-05-23 RX ADMIN — FENTANYL CITRATE 25 MICROGRAM(S): 50 INJECTION INTRAVENOUS at 09:00

## 2023-05-23 RX ADMIN — MEROPENEM 100 MILLIGRAM(S): 1 INJECTION INTRAVENOUS at 22:00

## 2023-05-23 RX ADMIN — PANTOPRAZOLE SODIUM 40 MILLIGRAM(S): 20 TABLET, DELAYED RELEASE ORAL at 12:09

## 2023-05-23 RX ADMIN — HEPARIN SODIUM 5000 UNIT(S): 5000 INJECTION INTRAVENOUS; SUBCUTANEOUS at 14:32

## 2023-05-23 RX ADMIN — CASPOFUNGIN ACETATE 70 MILLIGRAM(S): 7 INJECTION, POWDER, LYOPHILIZED, FOR SOLUTION INTRAVENOUS at 10:00

## 2023-05-23 RX ADMIN — CISATRACURIUM BESYLATE 12.6 MICROGRAM(S)/KG/MIN: 2 INJECTION INTRAVENOUS at 12:15

## 2023-05-23 RX ADMIN — FENTANYL CITRATE 25 MICROGRAM(S): 50 INJECTION INTRAVENOUS at 04:23

## 2023-05-23 RX ADMIN — FENTANYL CITRATE 25 MICROGRAM(S): 50 INJECTION INTRAVENOUS at 15:06

## 2023-05-23 RX ADMIN — MEROPENEM 100 MILLIGRAM(S): 1 INJECTION INTRAVENOUS at 14:32

## 2023-05-23 RX ADMIN — FENTANYL CITRATE 25 MICROGRAM(S): 50 INJECTION INTRAVENOUS at 01:25

## 2023-05-23 RX ADMIN — Medication 216 GRAM(S): at 06:20

## 2023-05-23 RX ADMIN — FENTANYL CITRATE 25 MICROGRAM(S): 50 INJECTION INTRAVENOUS at 04:50

## 2023-05-23 RX ADMIN — CHLORHEXIDINE GLUCONATE 15 MILLILITER(S): 213 SOLUTION TOPICAL at 19:10

## 2023-05-23 RX ADMIN — FENTANYL CITRATE 25 MICROGRAM(S): 50 INJECTION INTRAVENOUS at 09:12

## 2023-05-23 RX ADMIN — HEPARIN SODIUM 5000 UNIT(S): 5000 INJECTION INTRAVENOUS; SUBCUTANEOUS at 06:40

## 2023-05-23 RX ADMIN — Medication 125 MILLILITER(S): at 06:18

## 2023-05-23 RX ADMIN — HEPARIN SODIUM 5000 UNIT(S): 5000 INJECTION INTRAVENOUS; SUBCUTANEOUS at 22:00

## 2023-05-23 RX ADMIN — FENTANYL CITRATE 25 MICROGRAM(S): 50 INJECTION INTRAVENOUS at 19:33

## 2023-05-23 RX ADMIN — CHLORHEXIDINE GLUCONATE 1 APPLICATION(S): 213 SOLUTION TOPICAL at 06:41

## 2023-05-23 RX ADMIN — LACOSAMIDE 150 MILLIGRAM(S): 50 TABLET ORAL at 23:00

## 2023-05-23 RX ADMIN — Medication 1 EACH: at 18:27

## 2023-05-23 RX ADMIN — LACOSAMIDE 150 MILLIGRAM(S): 50 TABLET ORAL at 00:47

## 2023-05-23 RX ADMIN — Medication 216 GRAM(S): at 18:27

## 2023-05-23 RX ADMIN — Medication 216 GRAM(S): at 11:32

## 2023-05-23 RX ADMIN — FENTANYL CITRATE 25 MICROGRAM(S): 50 INJECTION INTRAVENOUS at 01:55

## 2023-05-23 RX ADMIN — MEROPENEM 100 MILLIGRAM(S): 1 INJECTION INTRAVENOUS at 06:36

## 2023-05-23 RX ADMIN — LACOSAMIDE 150 MILLIGRAM(S): 50 TABLET ORAL at 13:18

## 2023-05-23 RX ADMIN — Medication 125 MILLILITER(S): at 05:20

## 2023-05-23 NOTE — PROGRESS NOTE ADULT - ASSESSMENT
52yo Male pt with PMH HTN, type A aortic dissection s/p Dacron grafts, AV resuspension in 2013, CAD s/p CABG x 1 SVG to RCA (5/2013 with Dr. Medina), seizure disorder (last episode on 7/4/22) S/P replacement of transverse aortic arch, second stage thoracic endovascular aortic repair, aorto-axillary bypass, AV replacement (bio 23mm), in APr 2023 and CABG x 1 (SVG-RCA) EF 75% (Ignacio, 3/20) now s/p 2nd state TEVAR on 5/5 for whom surgery was consulted for finding of acute pancreatitis with large peripancreatic/perigastric fluid collections on CTA abdomen 5/8 then acute hemorrhage starting 5/12/23 requiring 11 U pRBC over last 48 hours but with negative mesenteric angiogram 5/13/23 for whom hepatobiliary surgery was reconsulted for possible hemorrhagic pancreatitis.     54yo Male pt with PMH HTN, type A aortic dissection s/p Dacron grafts, AV resuspension in 2013, CAD s/p CABG x 1 SVG to RCA (5/2013 with Dr. Medina), seizure disorder (last episode on 7/4/22) S/P replacement of transverse aortic arch, second stage thoracic endovascular aortic repair, aorto-axillary bypass, AV replacement (bio 23mm), in APr 2023 and CABG x 1 (SVG-RCA) EF 75% (Ignacio, 3/20) now s/p 2nd state TEVAR on 5/5 for whom surgery was consulted for finding of acute pancreatitis with large peripancreatic/perigastric fluid collections on CTA abdomen 5/8 then acute hemorrhage starting 5/12/23 requiring 11 U pRBC over last 48 hours but with negative mesenteric angiogram 5/13/23 for whom hepatobiliary surgery was reconsulted for possible hemorrhagic pancreatitis.    - f/u RUQ U/s  - Fractionate bilirubin  - f/u GI recs  - Continue resusitation per CTICU  - Surgery Team 1C will continue to follow. Please page Team 1 with questions/clinical changes. [phone]

## 2023-05-23 NOTE — PROGRESS NOTE ADULT - ATTENDING COMMENTS
Patient seen and d/w Dr. Potts. Agree with plan above.  Patient remains critically ill on pressor support with marked leukocytosis. GI will continue to follow.

## 2023-05-23 NOTE — PROGRESS NOTE ADULT - ASSESSMENT
53 YO Male, HTN aortic dissection previous admission with severe cardiogenic shock TREY requiring CVVHD presented o the ED with worsening epigastric pain CTA/P revealed continued endoleak hospital course complicated by necrotic pancreatitis now with non-oliguric TREY    #non-oliguric TREY  #necrotizing pancreatitis    recommend:  to c/w CVVHD as dnoy demonstrating adequate clearance and uop dropping   qb: 300ml/min net negative 50ml/hour (uptitrate accordingly) DFR 2L/hour  q8H BMP phos while on CVVHD  maintain MAP > 70 for adequate renal perfusion  strict i's and o's  renally dose abx egfr <15    discussed w/ CTICU team   Malika Garcia D.O  PGY 5 nephrology fellow  [phone]

## 2023-05-23 NOTE — PROGRESS NOTE ADULT - SUBJECTIVE AND OBJECTIVE BOX
Patient is a 53y Male seen and evaluated at bedside on CVVHD . patient remains intubated sedated on levo/vaso had 5L of ascites drained with IR yesterday- CVVHD stopped early this AM for hemodynamic instabiility-uop 500cc/24 hours K 4.5 bicarb 23 phos 5.6      Meds:    albumin human  5% IVPB 250 every 1 hour  albumin human 25% IVPB 50 every 30 minutes  ampicillin  IVPB 2 every 6 hours  aspirin  chewable 81 daily  caspofungin IVPB    caspofungin IVPB 70 once  chlorhexidine 0.12% Liquid 15 every 12 hours  chlorhexidine 2% Cloths 1 daily  cisatracurium Infusion 3 <Continuous>  CRRT Treatment  <Continuous>  dexMEDEtomidine Infusion 0.4 <Continuous>  esmolol  Infusion 25 <Continuous>  fentaNYL    Injectable 25 every 3 hours PRN  heparin   Injectable 5000 every 8 hours  lacosamide IVPB 250 every 12 hours  meropenem  IVPB 1000 every 8 hours  midazolam Infusion 0.02 <Continuous>  norepinephrine Infusion 0.05 <Continuous>  pantoprazole  Injectable 40 daily  Parenteral Nutrition - Adult 1 <Continuous>  Parenteral Nutrition - Adult 1 <Continuous>  propofol Infusion 10 <Continuous>  PureFlow Dialysate RFP-400 (K 2 / Ca 3) 5000 <Continuous>  sodium chloride 0.9%. 1000 <Continuous>  vasopressin Infusion 0.06 <Continuous>      T(C): , Max: 38.6 (05-22-23 @ 11:00)  T(F): , Max: 101.5 (05-22-23 @ 11:00)  HR: 124 (05-23-23 @ 10:00)  BP: 139/85 (05-22-23 @ 11:28)  BP(mean): 106 (05-22-23 @ 11:28)  RR: 22 (05-23-23 @ 10:00)  SpO2: 100% (05-23-23 @ 10:00)  Wt(kg): --    05-22 @ 07:01  -  05-23 @ 07:00  --------------------------------------------------------  IN: 2597 mL / OUT: 8224 mL / NET: -5627 mL    05-23 @ 07:01  -  05-23 @ 10:40  --------------------------------------------------------  IN: 14.4 mL / OUT: 5 mL / NET: 9.4 mL      Height (cm): 182.9 (05-22 @ 11:28)  Weight (kg): 70 (05-22 @ 11:28)  BMI (kg/m2): 20.9 (05-22 @ 11:28)  BSA (m2): 1.91 (05-22 @ 11:28)    Review of Systems:  unable to participate       PHYSICAL EXAM:  GENERAL: intubated; sedated   CHEST/LUNG: mechanical breath sounds BL  HEART: normal S1S2, RRR  ABDOMEN: Soft, Nontender, +BS, No flank tenderness bilateral  EXTREMITIES: + edema BL LE  SKIN: no lesions or rashes   ACCESS: [facility] cath       LABS:                        8.2    55.55 )-----------( 166      ( 23 May 2023 09:34 )             24.2     05-23    136  |  101  |  77<H>  ----------------------------<  155<H>  4.5   |  23  |  1.41<H>    Ca    8.8      23 May 2023 09:34  Phos  5.6     05-23  Mg     2.1     05-23    TPro  5.6<L>  /  Alb  3.0<L>  /  TBili  9.8<H>  /  DBili  x   /  AST  58<H>  /  ALT  26  /  AlkPhos  69  05-23      PT/INR - ( 23 May 2023 09:34 )   PT: 16.6 sec;   INR: 1.39          PTT - ( 23 May 2023 09:34 )  PTT:34.5 sec          RADIOLOGY & ADDITIONAL STUDIES:        Phosphorus Level, Serum: 5.6 mg/dL (05-23-23 @ 09:34)  Hemoglobin: 8.2 g/dL (05-23-23 @ 09:34)  Hemoglobin: 9.2 g/dL (05-23-23 @ 02:22)  Phosphorus Level, Serum: 4.3 mg/dL (05-23-23 @ 02:22)    Albumin, Serum: 3.0 g/dL (05-23-23 @ 09:34)  Albumin, Serum: 3.1 g/dL (05-23-23 @ 02:22)      CRRT Treatment:   Modality: CVVHD, Filter: NxStage CAR-505, Target Blood Flow: 300 mL/Min  Target Fluid Balance: Titrate to net NEGATIVE fluid balance, 50 mL/Hr (05-22-23 @ 12:08) [Active]

## 2023-05-23 NOTE — PROGRESS NOTE ADULT - ASSESSMENT
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       #Klebsiella Bacteremia  Klebsiella BSI i/s/o VAP and pancreatic necrosis/RP hemorrhage (adjacent to aortic vascular graft). f/u surveillance bcx 5/18, blood cultures X 2 from 5/19, sputum culture 5/19. Sputum culture from 5/19 with Klebsiella pneumoniae, Enterobacter cloacae (S shin, I erta), E. faecalis and Strep anginosus.     - f/u culture data  - c/w meropenem 1 g IV q8h and ampicillin 2 g IV q6h  - Agree with Surgery - would sample peritoneal fluid for cell count with diff, culture.  - recommend RUQ US to assess for hepatobiliary source of infection i/s/o increasing serum bilirubin  - advise against any fluoroquinolones due to increased risk of aortic aneurysmal rupture        Team 1 will continue to follow 52 yo male hx seizure disorder, aortic dissection s/p AVR, aortic graft revision 3/20/2023, second stage TEVAR 5/5/2023, course c/b peripancreatic/RP hemorrhage/hematoma formation.       #Klebsiella Bacteremia  Klebsiella BSI i/s/o VAP and pancreatic necrosis/RP hemorrhage (adjacent to aortic vascular graft). f/u surveillance bcx 5/18, blood cultures X 2 from 5/19, sputum culture 5/19. Sputum culture from 5/19 with Klebsiella pneumoniae, Enterobacter cloacae (S shin, I erta), E. faecalis and Strep anginosus.     - Start Trial of Caspofungin in critically ill patient. 70mg loading dose today 5/23 and 50mg IV daily moving forward, TPN is risk factor for candidemia  - send C. diff diagnostic labs if having diarrhea  - f/u culture data  - c/w meropenem 1 g IV q8h and ampicillin 2 g IV q6h  - Agree with Surgery - would sample peritoneal fluid for cell count with diff, culture. gram stain negative   - recommend RUQ US to assess for hepatobiliary source of infection i/s/o increasing serum bilirubin  - advise against any fluoroquinolones due to increased risk of aortic aneurysmal rupture        Team 1 will continue to follow 52 yo male hx seizure disorder, aortic dissection s/p AVR, aortic graft revision 3/20/2023, second stage TEVAR 5/5/2023, course c/b peripancreatic/RP hemorrhage/hematoma formation.       #Klebsiella Bacteremia  Klebsiella BSI i/s/o VAP and pancreatic necrosis/RP hemorrhage (adjacent to aortic vascular graft). f/u surveillance bcx 5/18, blood cultures X 2 from 5/19, sputum culture 5/19. Sputum culture from 5/19 with Klebsiella pneumoniae, Enterobacter cloacae (S shin, I erta), E. faecalis and Strep anginosus.     - Start Trial of Caspofungin in critically ill patient. 70mg loading dose today 5/23 and 50mg IV daily moving forward, TPN is risk factor for candidemia  - send C. diff diagnostic labs if having diarrhea  - f/u culture data  - c/w meropenem 1 g IV q8h and ampicillin 2 g IV q6h  - consult with Clinical Pharmacist for appropriate dose adjustments pending whether patient will be on CVVHD or not  - Agree with Surgery - would sample peritoneal fluid for cell count with diff, culture. gram stain negative   - recommend RUQ US to assess for hepatobiliary source of infection i/s/o increasing serum bilirubin  - advise against any fluoroquinolones due to increased risk of aortic aneurysmal rupture        Team 1 will continue to follow 52 yo male hx seizure disorder, aortic dissection s/p AVR, aortic graft revision 3/20/2023, second stage TEVAR 5/5/2023, course c/b peripancreatic/RP hemorrhage/hematoma formation.       #Klebsiella Bacteremia  Klebsiella BSI i/s/o VAP and pancreatic necrosis/RP hemorrhage (adjacent to aortic vascular graft). f/u surveillance bcx 5/18, blood cultures X 2 from 5/19, sputum culture 5/19. Sputum culture from 5/19 with Klebsiella pneumoniae, Enterobacter cloacae (S shin, I erta), E. faecalis and Strep anginosus.     - Start trial of caspofungin in critically ill patient. 70mg loading dose today 5/23 and 50mg IV daily moving forward, TPN is risk factor for candidemia  - send C. diff diagnostic labs if having diarrhea  - f/u culture data  - c/w meropenem 1 g IV q8h and ampicillin 2 g IV q6h  - consult with Clinical Pharmacist for appropriate dose adjustments pending whether patient will be on CVVHD or not  - f/u surgery/GI plan  - advise against any fluoroquinolones due to increased risk of aortic aneurysmal rupture        Team 1 will continue to follow

## 2023-05-23 NOTE — PROGRESS NOTE ADULT - ATTENDING COMMENTS
I agree with the fellow's findings and plans as written above with the following additions/amendments:    Seen and examined on CVVHD, tolerating with fluid off, continue CVVHD as above, further recs as above

## 2023-05-23 NOTE — PROGRESS NOTE ADULT - SUBJECTIVE AND OBJECTIVE BOX
PENDING Pt seen and examined at bedside.  Intubated, sedated on levo/vaso overnight  Unable to obtain ROS    Allergies    No Known Allergies    Intolerances        MEDICATIONS:  MEDICATIONS  (STANDING):  albumin human 25% IVPB 50 milliLiter(s) IV Intermittent every 30 minutes  ampicillin  IVPB 2 Gram(s) IV Intermittent every 6 hours  aspirin  chewable 81 milliGRAM(s) Oral daily  caspofungin IVPB      chlorhexidine 0.12% Liquid 15 milliLiter(s) Oral Mucosa every 12 hours  chlorhexidine 2% Cloths 1 Application(s) Topical daily  cisatracurium Infusion 3 MICROgram(s)/kG/Min (12.6 mL/Hr) IV Continuous <Continuous>  CRRT Treatment    <Continuous>  CRRT Treatment    <Continuous>  dexMEDEtomidine Infusion 0.4 MICROgram(s)/kG/Hr (7 mL/Hr) IV Continuous <Continuous>  heparin   Injectable 5000 Unit(s) SubCutaneous every 8 hours  lacosamide IVPB 250 milliGRAM(s) IV Intermittent every 12 hours  meropenem  IVPB 1000 milliGRAM(s) IV Intermittent every 8 hours  midazolam Infusion 0.02 mG/kG/Hr (1.4 mL/Hr) IV Continuous <Continuous>  norepinephrine Infusion 0.05 MICROgram(s)/kG/Min (6.56 mL/Hr) IV Continuous <Continuous>  pantoprazole  Injectable 40 milliGRAM(s) IV Push daily  Parenteral Nutrition - Adult 1 Each (70 mL/Hr) TPN Continuous <Continuous>  Parenteral Nutrition - Adult 1 Each (70 mL/Hr) TPN Continuous <Continuous>  phenylephrine    Infusion 0.2 MICROgram(s)/kG/Min (5.25 mL/Hr) IV Continuous <Continuous>  PureFlow Dialysate RFP-400 (K 2 / Ca 3) 5000 milliLiter(s) (2000 mL/Hr) CRRT <Continuous>  PureFlow Dialysate RFP-400 (K 2 / Ca 3) 5000 milliLiter(s) (2000 mL/Hr) CRRT <Continuous>  sodium chloride 0.9%. 1000 milliLiter(s) (10 mL/Hr) IV Continuous <Continuous>  vasopressin Infusion 0.06 Unit(s)/Min (9 mL/Hr) IV Continuous <Continuous>    MEDICATIONS  (PRN):  fentaNYL    Injectable 25 MICROGram(s) IV Push every 3 hours PRN Severe Pain (7 - 10)      Vital Signs Last 24 Hrs  T(C): 36.5 (23 May 2023 14:27), Max: 37.1 (22 May 2023 21:00)  T(F): 97.7 (23 May 2023 14:27), Max: 98.7 (22 May 2023 21:00)  HR: 120 (23 May 2023 14:00) (94 - 127)  BP: --  BP(mean): --  RR: 22 (23 May 2023 14:00) (22 - 24)  SpO2: 99% (23 May 2023 14:00) (95% - 100%)    Parameters below as of 23 May 2023 14:00  Patient On (Oxygen Delivery Method): ventilator        05-22 @ 07:01  -  05-23 @ 07:00  --------------------------------------------------------  IN: 2597 mL / OUT: 8224 mL / NET: -5627 mL    05-23 @ 07:01 - 05-23 @ 15:15  --------------------------------------------------------  IN: 1707.6 mL / OUT: 318 mL / NET: 1389.6 mL        PHYSICAL EXAM:    Physical Exam:  General: Intubated/sedated  Pulm: On MV support  Abd: Mildly distended  Skin: No rashes  Extremities: No edema.    LABS:  CBC Full  -  ( 23 May 2023 14:36 )  WBC Count : x  RBC Count : 2.40 M/uL  Hemoglobin : 7.5 g/dL  Hematocrit : 22.4 %  Platelet Count - Automated : 169 K/uL  Mean Cell Volume : 93.3 fl  Mean Cell Hemoglobin : 31.3 pg  Mean Cell Hemoglobin Concentration : 33.5 gm/dL  Auto Neutrophil # : x  Auto Lymphocyte # : x  Auto Monocyte # : x  Auto Eosinophil # : x  Auto Basophil # : x  Auto Neutrophil % : x  Auto Lymphocyte % : x  Auto Monocyte % : x  Auto Eosinophil % : x  Auto Basophil % : x    05-23    136  |  101  |  77<H>  ----------------------------<  155<H>  4.5   |  23  |  1.41<H>    Ca    8.8      23 May 2023 09:34  Phos  5.6     05-23  Mg     2.1     05-23    TPro  5.6<L>  /  Alb  3.0<L>  /  TBili  9.8<H>  /  DBili  x   /  AST  58<H>  /  ALT  26  /  AlkPhos  69  05-23    PT/INR - ( 23 May 2023 09:34 )   PT: 16.6 sec;   INR: 1.39          PTT - ( 23 May 2023 09:34 )  PTT:34.5 sec                  RADIOLOGY & ADDITIONAL STUDIES:    ACC: [id number] EXAM:  CT ANGIO ABD PELV (W)AW IC   ORDERED BY: CASSI JUNG     ACC: [id number] EXAM:  CT ANGIO CHEST AORTA WAWIC   ORDERED BY: CASSI JUNG     PROCEDURE DATE:  05/22/2023          INTERPRETATION:  CLINICAL INFORMATION: Swelling of extremity. Altered   mental status. Aortic dissection. Pancreatitis.    COMPARISON: None.    CONTRAST/COMPLICATIONS:  IV Contrast: Isovue 370  95 cc administered 5 cc discarded  Oral Contrast: NONE  Complications: None reported at time of study completion    PROCEDURE:  CT Angiography of the Chest, Abdomen and Pelvis.  Precontrast imaging was performed through the chest followed by arterial   phase imaging of the chest, abdomen and pelvis.  Sagittal and coronal reformats were performed as well as 3D (MIP)   reconstructions.    FINDINGS:  CHEST:  LUNGS AND LARGE AIRWAYS: ET tube in satisfactory position. Compressive   atelectasis again noted in both lower lobes. Patchy bilateral pulmonary   infiltrates, somewhat improved since 5/18/2023.  PLEURA: Persistent small bilateral pleural effusions, left greater than   right.  VESSELS: 2 right IJ lines in place, one terminating within the SVC and   the other in the right atrium. Status post graft repair of the ascending   aorta and portion of aortic arch. Endoluminal stent graft within the   descending thoracic aorta and upper abdominal aorta. Dissection flap   again noted in the right common carotid artery and right subclavian   artery which remain opacified. Origin of left subclavian artery is   occluded with opacification from bypass graft. Patent visualized portion   of left common carotid artery. Aneurysmal ascending thoracic aorta   measuring 6.2 cm proximally and axial images and 5.7 cm in the   midportion, similar to prior. No evidence of endoleak.  HEART: Mild cardiomegaly. Status post aortic valve replacement. No   pericardial effusion.  MEDIASTINUM AND MARJAN: Mild mediastinal lymphadenopathy.  CHEST WALL AND LOWER NECK: Unremarkable.    ABDOMEN AND PELVIS:  LIVER: Within normal limits.  BILE DUCTS: Normal caliber.  GALLBLADDER: Within normal limits.  SPLEEN: 5.9 x 2 cm crescentic subcapsular ascitic fluid again noted   overlying the spleen.  PANCREAS: Multiloculated collection again noted along the pancreatic tail   and in the left upper quadrant with the largest pocket measuring 5 cm   without significant change.  ADRENALS: Within normal limits.  KIDNEYS/URETERS: No hydronephrosis or nephrolithiasis. Heterogeneous   patchy renal parenchyma bilaterally these are obscured by artifact from   upper extremities.    BLADDER: Cantrell catheters remain an otherwise empty urinary bladder.  REPRODUCTIVE ORGANS: Unremarkable.    BOWEL: Tip of gastric tube within the stomach. No bowel obstruction.   Diffuse small bowel wall thickening again noted with a degree of   hyperenhancement.  PERITONEUM: Persistent large ascites likely containing hemorrhagic   component. No free air.  VESSELS: Dissection flap present within the abdominal aorta terminating   in the left common iliac artery. Infrarenal abdominal aorta measures 3.5   cm. Celiac artery, superior mesenteric artery, bilateral renal arteries   and inferior mesenteric artery are well opacified. Bilateral common,   external and internal iliac arteries remain patent. Left common iliac   artery is again aneurysmal measuring 2.2 cm.  RETROPERITONEUM/LYMPH NODES: Moderate-sized left retroperitoneal hematoma   has been stable measuring 9 x 8 cm axial images. No active bleeding.  ABDOMINAL WALL: Anasarca. Clips in the right groin.  BONES: Median sternotomy.    IMPRESSION:    Pulmonary infiltrates have somewhat improved, otherwise no significant   change since study of 5/18/2023.          --- End of Report ---            GARCIA BARKSDALE MD; Attending Radiologist  This document has been electronically signed. May 22 2023  2:27PM

## 2023-05-23 NOTE — PROGRESS NOTE ADULT - ASSESSMENT
53 YO Male, current every day marijuana use, w/ PMHx of HTN, type A aortic dissection s/p Dacron grafts, AV resuspension in 2013, CAD s/p CABG x 1 SVG to RCA (5/2013 with Dr. Medina), seizure disorder (last episode on 7/4/22) s/p TEVAR with Dr. Pierre 5/5 with post op leukocytosis and CT chest abd pelvis noting diffuse pancreatic enlargement and heterogenous attenuation predominantly in body and tail, with multiple intrapancreatic and peripancreatic fluid collections with well-defined enhancing wall, the largest fluid collection along stomach greater curvature 8.5 x 2 x 2 cm, communicates with inferior fluid collection measuring 5 x 3 x 3 cm with subsequent ICU course c/b acute onset of severe abdominal pain with repeat imaging noting enlarging and new left retroperitoneal hematoma in the left hemiabdomen and worsening hematoma involving the pancreas with hemoperitoneum now with klebsiella bacteremia    GI re-engaged by surgery team regarding role  of EUS guided drainage of abdominal fluid collections  S/p IR guided drainage of peripanc fluid collections with >7500 ml amylase.     Recommendations:  -Imaging pending review with advanced endoscopy attendings  -Final note pending discussion of imaging with Dr. Jaqueline Potts,   Gastroenterology Fellow  Pager: [phone]  After 5PM or on weekends, please contact [place]  for fellow on call

## 2023-05-23 NOTE — PROGRESS NOTE ADULT - SUBJECTIVE AND OBJECTIVE BOX
INFECTIOUS DISEASES CONSULT FOLLOW-UP NOTE    INTERVAL HPI/OVERNIGHT EVENTS:      ROS:   Constitutional, eyes, ENT, cardiovascular, respiratory, gastrointestinal, genitourinary, integumentary, neurological, psychiatric and heme/lymph are otherwise negative other than noted above       ANTIBIOTICS/RELEVANT:    MEDICATIONS  (STANDING):  albumin human 25% IVPB 50 milliLiter(s) IV Intermittent every 30 minutes  ampicillin  IVPB 2 Gram(s) IV Intermittent every 6 hours  aspirin  chewable 81 milliGRAM(s) Oral daily  chlorhexidine 0.12% Liquid 15 milliLiter(s) Oral Mucosa every 12 hours  chlorhexidine 2% Cloths 1 Application(s) Topical daily  cisatracurium Infusion 3 MICROgram(s)/kG/Min (12.6 mL/Hr) IV Continuous <Continuous>  CRRT Treatment    <Continuous>  dexMEDEtomidine Infusion 0.4 MICROgram(s)/kG/Hr (7 mL/Hr) IV Continuous <Continuous>  esmolol  Infusion 25 MICROgram(s)/kG/Min (10.5 mL/Hr) IV Continuous <Continuous>  heparin   Injectable 5000 Unit(s) SubCutaneous every 8 hours  lacosamide IVPB 250 milliGRAM(s) IV Intermittent every 12 hours  meropenem  IVPB 1000 milliGRAM(s) IV Intermittent every 8 hours  midazolam Infusion 0.02 mG/kG/Hr (1.4 mL/Hr) IV Continuous <Continuous>  norepinephrine Infusion 0.05 MICROgram(s)/kG/Min (6.56 mL/Hr) IV Continuous <Continuous>  pantoprazole  Injectable 40 milliGRAM(s) IV Push daily  Parenteral Nutrition - Adult 1 Each (70 mL/Hr) TPN Continuous <Continuous>  propofol Infusion 10 MICROgram(s)/kG/Min (4.2 mL/Hr) IV Continuous <Continuous>  PureFlow Dialysate RFP-400 (K 2 / Ca 3) 5000 milliLiter(s) (2000 mL/Hr) CRRT <Continuous>  sodium chloride 0.9%. 1000 milliLiter(s) (10 mL/Hr) IV Continuous <Continuous>  vasopressin Infusion 0.06 Unit(s)/Min (9 mL/Hr) IV Continuous <Continuous>    MEDICATIONS  (PRN):  fentaNYL    Injectable 25 MICROGram(s) IV Push every 3 hours PRN Severe Pain (7 - 10)        Vital Signs Last 24 Hrs  T(C): 34.7 (23 May 2023 05:11), Max: 38.6 (22 May 2023 11:00)  T(F): 94.5 (23 May 2023 05:11), Max: 101.5 (22 May 2023 11:00)  HR: 126 (23 May 2023 08:00) (94 - 127)  BP: 139/85 (22 May 2023 11:28) (139/85 - 139/85)  BP(mean): 106 (22 May 2023 11:28) (106 - 106)  RR: 23 (23 May 2023 08:00) (20 - 32)  SpO2: 99% (23 May 2023 08:00) (86% - 100%)    Parameters below as of 23 May 2023 08:00  Patient On (Oxygen Delivery Method): ventilator    O2 Concentration (%): 50    05-22-23 @ 07:01  -  05-23-23 @ 07:00  --------------------------------------------------------  IN: 2597 mL / OUT: 8224 mL / NET: -5627 mL    05-23-23 @ 07:01  -  05-23-23 @ 08:28  --------------------------------------------------------  IN: 14.4 mL / OUT: 5 mL / NET: 9.4 mL      PHYSICAL EXAM:  Constitutional: alert, NAD  Eyes: the sclera and conjunctiva were normal.   ENT: the ears and nose were normal in appearance.   Neck: the appearance of the neck was normal and the neck was supple.   Pulmonary: no respiratory distress and lungs were clear to auscultation bilaterally.   Heart: heart rate was normal and rhythm regular, normal S1 and S2  Vascular:. there was no peripheral edema  Abdomen: normal bowel sounds, soft, non-tender  Neurological: no focal deficits.   Psychiatric: the affect was normal        LABS:                        9.2    35.90 )-----------( 197      ( 23 May 2023 02:22 )             27.4     05-23    134<L>  |  100  |  68<H>  ----------------------------<  140<H>  4.1   |  23  |  1.23    Ca    8.8      23 May 2023 02:22  Phos  4.3     05-23  Mg     2.2     05-23    TPro  5.8<L>  /  Alb  3.1<L>  /  TBili  7.8<H>  /  DBili  x   /  AST  49<H>  /  ALT  23  /  AlkPhos  67  05-23    PT/INR - ( 23 May 2023 02:22 )   PT: 14.7 sec;   INR: 1.23          PTT - ( 23 May 2023 02:22 )  PTT:35.9 sec      MICROBIOLOGY:      RADIOLOGY & ADDITIONAL STUDIES:  Reviewed

## 2023-05-23 NOTE — PROGRESS NOTE ADULT - SUBJECTIVE AND OBJECTIVE BOX
CTICU  CRITICAL  CARE  attending     Hand off received 					   Pertinent clinical, laboratory, radiographic, hemodynamic, echocardiographic, respiratory data, microbiologic data and chart were reviewed and analyzed frequently throughout the course of the day and night  Patient seen and examined with CTS/ SH attending at bedside  Pt is a 53y , Male, HEALTH ISSUES - PROBLEM Dx:      , FAMILY HISTORY:  No pertinent family history in first degree relatives    PAST MEDICAL & SURGICAL HISTORY:  HTN (hypertension)      Aortic dissection      CAD (coronary artery disease)      Seizure disorder      S/P aortic bifurcation bypass graft      S/P CABG x 1        Patient is a 53y old  Male who presents with a chief complaint of endoleak, abdominal pain (23 May 2023 10:50)      14 system review was unremarkable    Vital signs, hemodynamic and respiratory parameters were reviewed from the bedside nursing flowsheet.  ICU Vital Signs Last 24 Hrs  T(C): 35.9 (23 May 2023 21:32), Max: 36.6 (23 May 2023 18:25)  T(F): 96.6 (23 May 2023 21:32), Max: 97.9 (23 May 2023 18:25)  HR: 115 (23 May 2023 22:00) (100 - 127)  BP: --  BP(mean): --  ABP: 138/85 (23 May 2023 22:00) (86/42 - 161/88)  ABP(mean): 109 (23 May 2023 22:00) (53 - 118)  RR: 22 (23 May 2023 22:00) (22 - 24)  SpO2: 100% (23 May 2023 22:00) (94% - 100%)    O2 Parameters below as of 23 May 2023 23:00  Patient On (Oxygen Delivery Method): ventilator    O2 Concentration (%): 50      Adult Advanced Hemodynamics Last 24 Hrs  CVP(mm Hg): 11 (23 May 2023 22:00) (6 - 19)  CVP(cm H2O): --  CO: 5.2 (23 May 2023 22:00) (5 - 7)  CI: 2.7 (23 May 2023 22:00) (2.6 - 3.6)  PA: --  PA(mean): --  PCWP: --  SVR: 1505 (23 May 2023 22:00) (856 - 1505)  SVRI: 2900 (23 May 2023 22:00) (1664 - 2900)  PVR: --  PVRI: --, ABG - ( 23 May 2023 14:36 )  pH, Arterial: 7.36  pH, Blood: x     /  pCO2: 40    /  pO2: 93    / HCO3: 23    / Base Excess: -2.7  /  SaO2: 98.0              Mode: AC/ CMV (Assist Control/ Continuous Mandatory Ventilation)  RR (machine): 22  TV (machine): 500  FiO2: 50  PEEP: 5  ITime: 1.2  MAP: 10  PIP: 22    Intake and output was reviewed and the fluid balance was calculated  Daily     Daily   I&O's Summary    22 May 2023 07:01  -  23 May 2023 07:00  --------------------------------------------------------  IN: 2597 mL / OUT: 8224 mL / NET: -5627 mL    23 May 2023 07:01  -  23 May 2023 22:43  --------------------------------------------------------  IN: 2793.6 mL / OUT: 2605 mL / NET: 188.6 mL        All lines and drain sites were assessed  Glycemic trend was reviewedCAPILLARY BLOOD GLUCOSE      POCT Blood Glucose.: 207 mg/dL (23 May 2023 19:13)    No acute change in mental status  Auscultation of the chest reveals equal bs  Abdomen is soft  Extremities are warm and well perfused  Wounds appear clean and unremarkable  Antibiotics are periop    labs  CBC Full  -  ( 23 May 2023 14:36 )  WBC Count : 57.18 K/uL  RBC Count : 2.40 M/uL  Hemoglobin : 7.5 g/dL  Hematocrit : 22.4 %  Platelet Count - Automated : 169 K/uL  Mean Cell Volume : 93.3 fl  Mean Cell Hemoglobin : 31.3 pg  Mean Cell Hemoglobin Concentration : 33.5 gm/dL  Auto Neutrophil # : x  Auto Lymphocyte # : x  Auto Monocyte # : x  Auto Eosinophil # : x  Auto Basophil # : x  Auto Neutrophil % : x  Auto Lymphocyte % : x  Auto Monocyte % : x  Auto Eosinophil % : x  Auto Basophil % : x    05-23    138  |  103  |  73<H>  ----------------------------<  215<H>  4.4   |  23  |  1.17    Ca    8.9      23 May 2023 14:36  Phos  4.5     05-23  Mg     2.1     05-23    TPro  5.4<L>  /  Alb  3.4  /  TBili  10.5<H>  /  DBili  8.9<H>  /  AST  59<H>  /  ALT  25  /  AlkPhos  60  05-23    PT/INR - ( 23 May 2023 14:36 )   PT: 15.6 sec;   INR: 1.31          PTT - ( 23 May 2023 14:36 )  PTT:33.4 sec  The current medications were reviewed   MEDICATIONS  (STANDING):  albumin human 25% IVPB 50 milliLiter(s) IV Intermittent every 30 minutes  ampicillin  IVPB 2 Gram(s) IV Intermittent every 6 hours  aspirin  chewable 81 milliGRAM(s) Oral daily  caspofungin IVPB      chlorhexidine 0.12% Liquid 15 milliLiter(s) Oral Mucosa every 12 hours  chlorhexidine 2% Cloths 1 Application(s) Topical daily  cisatracurium Infusion 3 MICROgram(s)/kG/Min (12.6 mL/Hr) IV Continuous <Continuous>  CRRT Treatment    <Continuous>  dexMEDEtomidine Infusion 0.4 MICROgram(s)/kG/Hr (7 mL/Hr) IV Continuous <Continuous>  heparin   Injectable 5000 Unit(s) SubCutaneous every 8 hours  lacosamide IVPB 250 milliGRAM(s) IV Intermittent every 12 hours  meropenem  IVPB 1000 milliGRAM(s) IV Intermittent every 8 hours  midazolam Infusion 0.02 mG/kG/Hr (1.4 mL/Hr) IV Continuous <Continuous>  norepinephrine Infusion 0.05 MICROgram(s)/kG/Min (6.56 mL/Hr) IV Continuous <Continuous>  pantoprazole  Injectable 40 milliGRAM(s) IV Push daily  Parenteral Nutrition - Adult 1 Each (70 mL/Hr) TPN Continuous <Continuous>  Parenteral Nutrition - Adult 1 Each (70 mL/Hr) TPN Continuous <Continuous>  phenylephrine    Infusion 0.2 MICROgram(s)/kG/Min (5.25 mL/Hr) IV Continuous <Continuous>  PureFlow Dialysate RFP-400 (K 2 / Ca 3) 5000 milliLiter(s) (2000 mL/Hr) CRRT <Continuous>  sodium chloride 0.9%. 1000 milliLiter(s) (10 mL/Hr) IV Continuous <Continuous>  vasopressin Infusion 0.06 Unit(s)/Min (9 mL/Hr) IV Continuous <Continuous>    MEDICATIONS  (PRN):  fentaNYL    Injectable 25 MICROGram(s) IV Push every 3 hours PRN Severe Pain (7 - 10)       PROBLEM LIST/ ASSESSMENT:  HEALTH ISSUES - PROBLEM Dx:      ,   Patient is a 53y old  Male who presents with a chief complaint of endoleak, abdominal pain (23 May 2023 10:50)     s/p cardiac surgery      Vasogenic shock due to hypotension in the cticu , will keep on pressors    Hypovolemic shock - > 20% intravascular depletion will replete volume    Acute blood loss anemia with relative hypotension treated with > 1 unit PC      Acute respiratory insufficiency  ruled in due to prolonged mechanical ventilation > 24 hrs on the vent due to failure to wean due to weak respiratory mechanics    Toxic metabolic encephalopathy ; sundowning due to anesthesia pain medications    Acidosis evidenced by anion gap and negative base excess    Acute kidney injury - creatinine > 0.3 due to combined prerenal and intrarenal factors can presume ATN        My plan includes :    Plat 22  d/w id add caspofungin    pancr fluid positive for klebsiella - d/w dr morfin and dr davison - plan drainage of panc collection    nmb    cvvh goal neg bal 1 litre        close hemodynamic, ventilatory and drain monitoring and management per post op routine    Monitor for arrhythmias and monitor parameters for organ perfusion  beta blockade not administered due to hemodynamic instability and bradycardia  monitor neurologic status  Head of the bed should remain elevated to 45 deg .   chest PT and IS will be encouraged  monitor adequacy of oxygenation and ventilation and attempt to wean oxygen  antibiotic regimen will be tailored to the clinical, laboratory and microbiologic data  Nutritional goals will be met using po eventually , ensure adequate caloric intake and montior the same  Stress ulcer and VTE prophylaxis will be achieved    Glycemic control is satisfactory  Electrolytes have been repleted as necessary and wound care has been carried out. Pain control has been achieved.   agressive physical therapy and early mobility and ambulation goals will be met   The family was updated about the course and plan  CRITICAL CARE TIME Upon my evaluation, this patient had a high probability of imminent or life-threatening deterioration due to the above problems which required my direct attention, intervention, and personal management.  I have personally provided 110 minutes of critical care time exclusive of time spent on separately billable procedures. Time included review of laboratory data, radiology results, discussion with consultants, and monitoring for potential decompensation. Interventions were performed as documented abovepersonally provided by me  in evaluation and management, reassessments, review and interpretation of labs and x-rays, ventilator and hemodynamic management, formulating a plan and coordinating care: ___110___ MIN.  Time does not include procedural time.    CTICU ATTENDING     					    Bubba Craft MD

## 2023-05-24 LAB
-  AMPICILLIN/SULBACTAM: SIGNIFICANT CHANGE UP
-  AMPICILLIN: SIGNIFICANT CHANGE UP
-  CEFAZOLIN: SIGNIFICANT CHANGE UP
-  CEFTRIAXONE: SIGNIFICANT CHANGE UP
-  CIPROFLOXACIN: SIGNIFICANT CHANGE UP
-  ERTAPENEM: SIGNIFICANT CHANGE UP
-  GENTAMICIN: SIGNIFICANT CHANGE UP
-  PIPERACILLIN/TAZOBACTAM: SIGNIFICANT CHANGE UP
-  TOBRAMYCIN: SIGNIFICANT CHANGE UP
-  TRIMETHOPRIM/SULFAMETHOXAZOLE: SIGNIFICANT CHANGE UP
ALBUMIN SERPL ELPH-MCNC: 2.6 G/DL — LOW (ref 3.3–5)
ALBUMIN SERPL ELPH-MCNC: 2.7 G/DL — LOW (ref 3.3–5)
ALBUMIN SERPL ELPH-MCNC: 2.7 G/DL — LOW (ref 3.3–5)
ALBUMIN SERPL ELPH-MCNC: 2.9 G/DL — LOW (ref 3.3–5)
ALBUMIN SERPL ELPH-MCNC: 3 G/DL — LOW (ref 3.3–5)
ALP SERPL-CCNC: 103 U/L — SIGNIFICANT CHANGE UP (ref 40–120)
ALP SERPL-CCNC: 78 U/L — SIGNIFICANT CHANGE UP (ref 40–120)
ALP SERPL-CCNC: 84 U/L — SIGNIFICANT CHANGE UP (ref 40–120)
ALP SERPL-CCNC: 88 U/L — SIGNIFICANT CHANGE UP (ref 40–120)
ALP SERPL-CCNC: 97 U/L — SIGNIFICANT CHANGE UP (ref 40–120)
ALT FLD-CCNC: 21 U/L — SIGNIFICANT CHANGE UP (ref 10–45)
ALT FLD-CCNC: 22 U/L — SIGNIFICANT CHANGE UP (ref 10–45)
ALT FLD-CCNC: 24 U/L — SIGNIFICANT CHANGE UP (ref 10–45)
ALT FLD-CCNC: 25 U/L — SIGNIFICANT CHANGE UP (ref 10–45)
ALT FLD-CCNC: 26 U/L — SIGNIFICANT CHANGE UP (ref 10–45)
AMYLASE FLD-CCNC: >7500 U/L — SIGNIFICANT CHANGE UP
AMYLASE P1 CFR SERPL: 141 U/L — HIGH (ref 25–125)
AMYLASE P1 CFR SERPL: 171 U/L — HIGH (ref 25–125)
AMYLASE P1 CFR SERPL: 208 U/L — HIGH (ref 25–125)
ANION GAP SERPL CALC-SCNC: 10 MMOL/L — SIGNIFICANT CHANGE UP (ref 5–17)
ANION GAP SERPL CALC-SCNC: 10 MMOL/L — SIGNIFICANT CHANGE UP (ref 5–17)
ANION GAP SERPL CALC-SCNC: 11 MMOL/L — SIGNIFICANT CHANGE UP (ref 5–17)
ANION GAP SERPL CALC-SCNC: 11 MMOL/L — SIGNIFICANT CHANGE UP (ref 5–17)
ANION GAP SERPL CALC-SCNC: 13 MMOL/L — SIGNIFICANT CHANGE UP (ref 5–17)
APTT BLD: 30.8 SEC — SIGNIFICANT CHANGE UP (ref 27.5–35.5)
APTT BLD: 32.2 SEC — SIGNIFICANT CHANGE UP (ref 27.5–35.5)
APTT BLD: 32.2 SEC — SIGNIFICANT CHANGE UP (ref 27.5–35.5)
APTT BLD: 32.8 SEC — SIGNIFICANT CHANGE UP (ref 27.5–35.5)
APTT BLD: 36 SEC — HIGH (ref 27.5–35.5)
AST SERPL-CCNC: 41 U/L — HIGH (ref 10–40)
AST SERPL-CCNC: 49 U/L — HIGH (ref 10–40)
AST SERPL-CCNC: 52 U/L — HIGH (ref 10–40)
AST SERPL-CCNC: 59 U/L — HIGH (ref 10–40)
AST SERPL-CCNC: 60 U/L — HIGH (ref 10–40)
B PERT IGG+IGM PNL SER: SIGNIFICANT CHANGE UP
BASE EXCESS BLDA CALC-SCNC: -2.9 MMOL/L — LOW (ref -2–3)
BASE EXCESS BLDV CALC-SCNC: -1.1 MMOL/L — SIGNIFICANT CHANGE UP (ref -2–3)
BASE EXCESS BLDV CALC-SCNC: -2.4 MMOL/L — LOW (ref -2–3)
BILIRUB SERPL-MCNC: 10 MG/DL — HIGH (ref 0.2–1.2)
BILIRUB SERPL-MCNC: 10.6 MG/DL — HIGH (ref 0.2–1.2)
BILIRUB SERPL-MCNC: 9.1 MG/DL — HIGH (ref 0.2–1.2)
BILIRUB SERPL-MCNC: 9.7 MG/DL — HIGH (ref 0.2–1.2)
BILIRUB SERPL-MCNC: 9.9 MG/DL — HIGH (ref 0.2–1.2)
BUN SERPL-MCNC: 45 MG/DL — HIGH (ref 7–23)
BUN SERPL-MCNC: 46 MG/DL — HIGH (ref 7–23)
BUN SERPL-MCNC: 47 MG/DL — HIGH (ref 7–23)
BUN SERPL-MCNC: 49 MG/DL — HIGH (ref 7–23)
BUN SERPL-MCNC: 61 MG/DL — HIGH (ref 7–23)
CA-I SERPL-SCNC: 1.26 MMOL/L — SIGNIFICANT CHANGE UP (ref 1.15–1.33)
CA-I SERPL-SCNC: 1.3 MMOL/L — SIGNIFICANT CHANGE UP (ref 1.15–1.33)
CALCIUM SERPL-MCNC: 8.6 MG/DL — SIGNIFICANT CHANGE UP (ref 8.4–10.5)
CALCIUM SERPL-MCNC: 8.6 MG/DL — SIGNIFICANT CHANGE UP (ref 8.4–10.5)
CALCIUM SERPL-MCNC: 8.8 MG/DL — SIGNIFICANT CHANGE UP (ref 8.4–10.5)
CALCIUM SERPL-MCNC: 9 MG/DL — SIGNIFICANT CHANGE UP (ref 8.4–10.5)
CALCIUM SERPL-MCNC: 9 MG/DL — SIGNIFICANT CHANGE UP (ref 8.4–10.5)
CHLORIDE SERPL-SCNC: 100 MMOL/L — SIGNIFICANT CHANGE UP (ref 96–108)
CHLORIDE SERPL-SCNC: 101 MMOL/L — SIGNIFICANT CHANGE UP (ref 96–108)
CHLORIDE SERPL-SCNC: 102 MMOL/L — SIGNIFICANT CHANGE UP (ref 96–108)
CHLORIDE SERPL-SCNC: 99 MMOL/L — SIGNIFICANT CHANGE UP (ref 96–108)
CHLORIDE SERPL-SCNC: 99 MMOL/L — SIGNIFICANT CHANGE UP (ref 96–108)
CK SERPL-CCNC: 62 U/L — SIGNIFICANT CHANGE UP (ref 30–200)
CK SERPL-CCNC: 78 U/L — SIGNIFICANT CHANGE UP (ref 30–200)
CK SERPL-CCNC: 95 U/L — SIGNIFICANT CHANGE UP (ref 30–200)
CO2 BLDA-SCNC: 23 MMOL/L — SIGNIFICANT CHANGE UP (ref 19–24)
CO2 BLDV-SCNC: 25.1 MMOL/L — SIGNIFICANT CHANGE UP (ref 22–26)
CO2 BLDV-SCNC: 25.6 MMOL/L — SIGNIFICANT CHANGE UP (ref 22–26)
CO2 SERPL-SCNC: 22 MMOL/L — SIGNIFICANT CHANGE UP (ref 22–31)
CO2 SERPL-SCNC: 22 MMOL/L — SIGNIFICANT CHANGE UP (ref 22–31)
CO2 SERPL-SCNC: 23 MMOL/L — SIGNIFICANT CHANGE UP (ref 22–31)
CO2 SERPL-SCNC: 24 MMOL/L — SIGNIFICANT CHANGE UP (ref 22–31)
CO2 SERPL-SCNC: 25 MMOL/L — SIGNIFICANT CHANGE UP (ref 22–31)
COLOR FLD: SIGNIFICANT CHANGE UP
COMMENT - FLUIDS: SIGNIFICANT CHANGE UP
CREAT SERPL-MCNC: 0.74 MG/DL — SIGNIFICANT CHANGE UP (ref 0.5–1.3)
CREAT SERPL-MCNC: 0.82 MG/DL — SIGNIFICANT CHANGE UP (ref 0.5–1.3)
CREAT SERPL-MCNC: 0.83 MG/DL — SIGNIFICANT CHANGE UP (ref 0.5–1.3)
CREAT SERPL-MCNC: 0.89 MG/DL — SIGNIFICANT CHANGE UP (ref 0.5–1.3)
CREAT SERPL-MCNC: 1.1 MG/DL — SIGNIFICANT CHANGE UP (ref 0.5–1.3)
CULTURE RESULTS: SIGNIFICANT CHANGE UP
EGFR: 102 ML/MIN/1.73M2 — SIGNIFICANT CHANGE UP
EGFR: 104 ML/MIN/1.73M2 — SIGNIFICANT CHANGE UP
EGFR: 104 ML/MIN/1.73M2 — SIGNIFICANT CHANGE UP
EGFR: 108 ML/MIN/1.73M2 — SIGNIFICANT CHANGE UP
EGFR: 80 ML/MIN/1.73M2 — SIGNIFICANT CHANGE UP
FLUID INTAKE SUBSTANCE CLASS: SIGNIFICANT CHANGE UP
GAS PNL BLDA: SIGNIFICANT CHANGE UP
GAS PNL BLDV: 131 MMOL/L — LOW (ref 136–145)
GAS PNL BLDV: 132 MMOL/L — LOW (ref 136–145)
GAS PNL BLDV: SIGNIFICANT CHANGE UP
GAS PNL BLDV: SIGNIFICANT CHANGE UP
GLUCOSE BLDC GLUCOMTR-MCNC: 195 MG/DL — HIGH (ref 70–99)
GLUCOSE SERPL-MCNC: 201 MG/DL — HIGH (ref 70–99)
GLUCOSE SERPL-MCNC: 228 MG/DL — HIGH (ref 70–99)
GLUCOSE SERPL-MCNC: 231 MG/DL — HIGH (ref 70–99)
GLUCOSE SERPL-MCNC: 246 MG/DL — HIGH (ref 70–99)
GLUCOSE SERPL-MCNC: 250 MG/DL — HIGH (ref 70–99)
GRAM STN FLD: SIGNIFICANT CHANGE UP
HCO3 BLDA-SCNC: 22 MMOL/L — SIGNIFICANT CHANGE UP (ref 21–28)
HCO3 BLDV-SCNC: 24 MMOL/L — SIGNIFICANT CHANGE UP (ref 22–29)
HCO3 BLDV-SCNC: 24 MMOL/L — SIGNIFICANT CHANGE UP (ref 22–29)
HCT VFR BLD CALC: 20.3 % — CRITICAL LOW (ref 39–50)
HCT VFR BLD CALC: 25.1 % — LOW (ref 39–50)
HCT VFR BLD CALC: 26.8 % — LOW (ref 39–50)
HCT VFR BLD CALC: 29.2 % — LOW (ref 39–50)
HCT VFR BLD CALC: 30.4 % — LOW (ref 39–50)
HGB BLD-MCNC: 10.3 G/DL — LOW (ref 13–17)
HGB BLD-MCNC: 10.9 G/DL — LOW (ref 13–17)
HGB BLD-MCNC: 6.8 G/DL — CRITICAL LOW (ref 13–17)
HGB BLD-MCNC: 8.9 G/DL — LOW (ref 13–17)
HGB BLD-MCNC: 9.1 G/DL — LOW (ref 13–17)
INR BLD: 1.24 — HIGH (ref 0.88–1.16)
INR BLD: 1.27 — HIGH (ref 0.88–1.16)
INR BLD: 1.28 — HIGH (ref 0.88–1.16)
INR BLD: 1.29 — HIGH (ref 0.88–1.16)
INR BLD: 1.36 — HIGH (ref 0.88–1.16)
LACTATE SERPL-SCNC: 1.5 MMOL/L — SIGNIFICANT CHANGE UP (ref 0.5–2)
LACTATE SERPL-SCNC: 1.7 MMOL/L — SIGNIFICANT CHANGE UP (ref 0.5–2)
LACTATE SERPL-SCNC: 1.9 MMOL/L — SIGNIFICANT CHANGE UP (ref 0.5–2)
LACTATE SERPL-SCNC: 2 MMOL/L — SIGNIFICANT CHANGE UP (ref 0.5–2)
LACTATE SERPL-SCNC: 2 MMOL/L — SIGNIFICANT CHANGE UP (ref 0.5–2)
LACTATE SERPL-SCNC: 4.9 MMOL/L — CRITICAL HIGH (ref 0.5–2)
LDH SERPL L TO P-CCNC: SIGNIFICANT CHANGE UP U/L
LDH SERPL L TO P-CCNC: SIGNIFICANT CHANGE UP U/L
LIDOCAIN IGE QN: 379 U/L — HIGH (ref 7–60)
LIDOCAIN IGE QN: 473 U/L — HIGH (ref 7–60)
LIDOCAIN IGE QN: 583 U/L — HIGH (ref 7–60)
MAGNESIUM SERPL-MCNC: 2.1 MG/DL — SIGNIFICANT CHANGE UP (ref 1.6–2.6)
MAGNESIUM SERPL-MCNC: 2.2 MG/DL — SIGNIFICANT CHANGE UP (ref 1.6–2.6)
MAGNESIUM SERPL-MCNC: 2.5 MG/DL — SIGNIFICANT CHANGE UP (ref 1.6–2.6)
MCHC RBC-ENTMCNC: 30.7 PG — SIGNIFICANT CHANGE UP (ref 27–34)
MCHC RBC-ENTMCNC: 31.1 PG — SIGNIFICANT CHANGE UP (ref 27–34)
MCHC RBC-ENTMCNC: 31.3 PG — SIGNIFICANT CHANGE UP (ref 27–34)
MCHC RBC-ENTMCNC: 31.4 PG — SIGNIFICANT CHANGE UP (ref 27–34)
MCHC RBC-ENTMCNC: 31.5 PG — SIGNIFICANT CHANGE UP (ref 27–34)
MCHC RBC-ENTMCNC: 33.5 GM/DL — SIGNIFICANT CHANGE UP (ref 32–36)
MCHC RBC-ENTMCNC: 34 GM/DL — SIGNIFICANT CHANGE UP (ref 32–36)
MCHC RBC-ENTMCNC: 35.3 GM/DL — SIGNIFICANT CHANGE UP (ref 32–36)
MCHC RBC-ENTMCNC: 35.5 GM/DL — SIGNIFICANT CHANGE UP (ref 32–36)
MCHC RBC-ENTMCNC: 35.9 GM/DL — SIGNIFICANT CHANGE UP (ref 32–36)
MCV RBC AUTO: 86.9 FL — SIGNIFICANT CHANGE UP (ref 80–100)
MCV RBC AUTO: 87.6 FL — SIGNIFICANT CHANGE UP (ref 80–100)
MCV RBC AUTO: 88.4 FL — SIGNIFICANT CHANGE UP (ref 80–100)
MCV RBC AUTO: 92.7 FL — SIGNIFICANT CHANGE UP (ref 80–100)
MCV RBC AUTO: 92.7 FL — SIGNIFICANT CHANGE UP (ref 80–100)
METHOD TYPE: SIGNIFICANT CHANGE UP
NEUTROPHILS-BODY FLUID: SIGNIFICANT CHANGE UP %
NRBC # BLD: 0 /100 WBCS — SIGNIFICANT CHANGE UP (ref 0–0)
ORGANISM # SPEC MICROSCOPIC CNT: SIGNIFICANT CHANGE UP
ORGANISM # SPEC MICROSCOPIC CNT: SIGNIFICANT CHANGE UP
PCO2 BLDA: 38 MMHG — SIGNIFICANT CHANGE UP (ref 35–48)
PCO2 BLDV: 42 MMHG — SIGNIFICANT CHANGE UP (ref 42–55)
PCO2 BLDV: 45 MMHG — SIGNIFICANT CHANGE UP (ref 42–55)
PH BLDA: 7.37 — SIGNIFICANT CHANGE UP (ref 7.35–7.45)
PH BLDV: 7.33 — SIGNIFICANT CHANGE UP (ref 7.32–7.43)
PH BLDV: 7.37 — SIGNIFICANT CHANGE UP (ref 7.32–7.43)
PHOSPHATE SERPL-MCNC: 2.3 MG/DL — LOW (ref 2.5–4.5)
PHOSPHATE SERPL-MCNC: 2.8 MG/DL — SIGNIFICANT CHANGE UP (ref 2.5–4.5)
PHOSPHATE SERPL-MCNC: 3.2 MG/DL — SIGNIFICANT CHANGE UP (ref 2.5–4.5)
PHOSPHATE SERPL-MCNC: 3.3 MG/DL — SIGNIFICANT CHANGE UP (ref 2.5–4.5)
PHOSPHATE SERPL-MCNC: 3.9 MG/DL — SIGNIFICANT CHANGE UP (ref 2.5–4.5)
PLATELET # BLD AUTO: 105 K/UL — LOW (ref 150–400)
PLATELET # BLD AUTO: 112 K/UL — LOW (ref 150–400)
PLATELET # BLD AUTO: 151 K/UL — SIGNIFICANT CHANGE UP (ref 150–400)
PLATELET # BLD AUTO: 153 K/UL — SIGNIFICANT CHANGE UP (ref 150–400)
PLATELET # BLD AUTO: 96 K/UL — LOW (ref 150–400)
PO2 BLDA: 250 MMHG — HIGH (ref 83–108)
PO2 BLDV: 40 MMHG — SIGNIFICANT CHANGE UP (ref 25–45)
PO2 BLDV: 44 MMHG — SIGNIFICANT CHANGE UP (ref 25–45)
POTASSIUM BLDV-SCNC: 3.8 MMOL/L — SIGNIFICANT CHANGE UP (ref 3.5–5.1)
POTASSIUM BLDV-SCNC: 4.2 MMOL/L — SIGNIFICANT CHANGE UP (ref 3.5–5.1)
POTASSIUM SERPL-MCNC: 3.7 MMOL/L — SIGNIFICANT CHANGE UP (ref 3.5–5.3)
POTASSIUM SERPL-MCNC: 3.9 MMOL/L — SIGNIFICANT CHANGE UP (ref 3.5–5.3)
POTASSIUM SERPL-MCNC: 4.2 MMOL/L — SIGNIFICANT CHANGE UP (ref 3.5–5.3)
POTASSIUM SERPL-MCNC: 4.5 MMOL/L — SIGNIFICANT CHANGE UP (ref 3.5–5.3)
POTASSIUM SERPL-MCNC: 5.1 MMOL/L — SIGNIFICANT CHANGE UP (ref 3.5–5.3)
POTASSIUM SERPL-SCNC: 3.7 MMOL/L — SIGNIFICANT CHANGE UP (ref 3.5–5.3)
POTASSIUM SERPL-SCNC: 3.9 MMOL/L — SIGNIFICANT CHANGE UP (ref 3.5–5.3)
POTASSIUM SERPL-SCNC: 4.2 MMOL/L — SIGNIFICANT CHANGE UP (ref 3.5–5.3)
POTASSIUM SERPL-SCNC: 4.5 MMOL/L — SIGNIFICANT CHANGE UP (ref 3.5–5.3)
POTASSIUM SERPL-SCNC: 5.1 MMOL/L — SIGNIFICANT CHANGE UP (ref 3.5–5.3)
PROT FLD-MCNC: SIGNIFICANT CHANGE UP G/DL
PROT SERPL-MCNC: 5.1 G/DL — LOW (ref 6–8.3)
PROT SERPL-MCNC: 5.3 G/DL — LOW (ref 6–8.3)
PROT SERPL-MCNC: 5.4 G/DL — LOW (ref 6–8.3)
PROT SERPL-MCNC: 5.9 G/DL — LOW (ref 6–8.3)
PROT SERPL-MCNC: 6 G/DL — SIGNIFICANT CHANGE UP (ref 6–8.3)
PROTHROM AB SERPL-ACNC: 14.8 SEC — HIGH (ref 10.5–13.4)
PROTHROM AB SERPL-ACNC: 15.1 SEC — HIGH (ref 10.5–13.4)
PROTHROM AB SERPL-ACNC: 15.3 SEC — HIGH (ref 10.5–13.4)
PROTHROM AB SERPL-ACNC: 15.4 SEC — HIGH (ref 10.5–13.4)
PROTHROM AB SERPL-ACNC: 16.2 SEC — HIGH (ref 10.5–13.4)
RBC # BLD: 2.19 M/UL — LOW (ref 4.2–5.8)
RBC # BLD: 2.84 M/UL — LOW (ref 4.2–5.8)
RBC # BLD: 2.89 M/UL — LOW (ref 4.2–5.8)
RBC # BLD: 3.36 M/UL — LOW (ref 4.2–5.8)
RBC # BLD: 3.47 M/UL — LOW (ref 4.2–5.8)
RBC # FLD: 15.5 % — HIGH (ref 10.3–14.5)
RBC # FLD: 16.4 % — HIGH (ref 10.3–14.5)
RBC # FLD: 16.6 % — HIGH (ref 10.3–14.5)
RBC # FLD: 17.1 % — HIGH (ref 10.3–14.5)
RBC # FLD: 17.2 % — HIGH (ref 10.3–14.5)
RCV VOL RI: HIGH /UL (ref 0–0)
SAO2 % BLDA: 99.7 % — HIGH (ref 94–98)
SAO2 % BLDV: 68.8 % — SIGNIFICANT CHANGE UP (ref 67–88)
SAO2 % BLDV: 73.6 % — SIGNIFICANT CHANGE UP (ref 67–88)
SODIUM SERPL-SCNC: 132 MMOL/L — LOW (ref 135–145)
SODIUM SERPL-SCNC: 133 MMOL/L — LOW (ref 135–145)
SODIUM SERPL-SCNC: 135 MMOL/L — SIGNIFICANT CHANGE UP (ref 135–145)
SODIUM SERPL-SCNC: 135 MMOL/L — SIGNIFICANT CHANGE UP (ref 135–145)
SODIUM SERPL-SCNC: 137 MMOL/L — SIGNIFICANT CHANGE UP (ref 135–145)
SPECIMEN SOURCE: SIGNIFICANT CHANGE UP
TOTAL NUCLEATED CELL COUNT, BODY FLUID: SIGNIFICANT CHANGE UP /UL
TRIGL SERPL-MCNC: 146 MG/DL — SIGNIFICANT CHANGE UP
TRIGL SERPL-MCNC: 149 MG/DL — SIGNIFICANT CHANGE UP
TRIGL SERPL-MCNC: SIGNIFICANT CHANGE UP
TUBE TYPE: SIGNIFICANT CHANGE UP
WBC # BLD: 48.29 K/UL — CRITICAL HIGH (ref 3.8–10.5)
WBC # BLD: 52.55 K/UL — CRITICAL HIGH (ref 3.8–10.5)
WBC # BLD: 53.27 K/UL — CRITICAL HIGH (ref 3.8–10.5)
WBC # BLD: 59.11 K/UL — CRITICAL HIGH (ref 3.8–10.5)
WBC # BLD: 59.26 K/UL — CRITICAL HIGH (ref 3.8–10.5)
WBC # FLD AUTO: 48.29 K/UL — CRITICAL HIGH (ref 3.8–10.5)
WBC # FLD AUTO: 52.55 K/UL — CRITICAL HIGH (ref 3.8–10.5)
WBC # FLD AUTO: 53.27 K/UL — CRITICAL HIGH (ref 3.8–10.5)
WBC # FLD AUTO: 59.11 K/UL — CRITICAL HIGH (ref 3.8–10.5)
WBC # FLD AUTO: 59.26 K/UL — CRITICAL HIGH (ref 3.8–10.5)

## 2023-05-24 PROCEDURE — 99232 SBSQ HOSP IP/OBS MODERATE 35: CPT

## 2023-05-24 PROCEDURE — 49406 IMAGE CATH FLUID PERI/RETRO: CPT

## 2023-05-24 RX ORDER — POTASSIUM CHLORIDE 20 MEQ
20 PACKET (EA) ORAL ONCE
Refills: 0 | Status: COMPLETED | OUTPATIENT
Start: 2023-05-24 | End: 2023-05-24

## 2023-05-24 RX ORDER — ELECTROLYTE SOLUTION,INJ
1 VIAL (ML) INTRAVENOUS
Refills: 0 | Status: DISCONTINUED | OUTPATIENT
Start: 2023-05-24 | End: 2023-05-24

## 2023-05-24 RX ORDER — ALBUMIN HUMAN 25 %
250 VIAL (ML) INTRAVENOUS ONCE
Refills: 0 | Status: COMPLETED | OUTPATIENT
Start: 2023-05-24 | End: 2023-05-24

## 2023-05-24 RX ORDER — DOBUTAMINE HCL 250MG/20ML
5 VIAL (ML) INTRAVENOUS
Qty: 500 | Refills: 0 | Status: DISCONTINUED | OUTPATIENT
Start: 2023-05-24 | End: 2023-05-25

## 2023-05-24 RX ORDER — EPINEPHRINE 0.3 MG/.3ML
0.03 INJECTION INTRAMUSCULAR; SUBCUTANEOUS
Qty: 4 | Refills: 0 | Status: DISCONTINUED | OUTPATIENT
Start: 2023-05-24 | End: 2023-05-25

## 2023-05-24 RX ADMIN — CISATRACURIUM BESYLATE 12.6 MICROGRAM(S)/KG/MIN: 2 INJECTION INTRAVENOUS at 09:11

## 2023-05-24 RX ADMIN — HEPARIN SODIUM 5000 UNIT(S): 5000 INJECTION INTRAVENOUS; SUBCUTANEOUS at 21:13

## 2023-05-24 RX ADMIN — FENTANYL CITRATE 25 MICROGRAM(S): 50 INJECTION INTRAVENOUS at 10:23

## 2023-05-24 RX ADMIN — SODIUM CHLORIDE 10 MILLILITER(S): 9 INJECTION INTRAMUSCULAR; INTRAVENOUS; SUBCUTANEOUS at 21:12

## 2023-05-24 RX ADMIN — Medication 1 EACH: at 21:12

## 2023-05-24 RX ADMIN — Medication 81 MILLIGRAM(S): at 11:58

## 2023-05-24 RX ADMIN — CISATRACURIUM BESYLATE 12.6 MICROGRAM(S)/KG/MIN: 2 INJECTION INTRAVENOUS at 21:13

## 2023-05-24 RX ADMIN — FENTANYL CITRATE 25 MICROGRAM(S): 50 INJECTION INTRAVENOUS at 22:30

## 2023-05-24 RX ADMIN — MEROPENEM 100 MILLIGRAM(S): 1 INJECTION INTRAVENOUS at 05:49

## 2023-05-24 RX ADMIN — LACOSAMIDE 150 MILLIGRAM(S): 50 TABLET ORAL at 12:00

## 2023-05-24 RX ADMIN — CHLORHEXIDINE GLUCONATE 1 APPLICATION(S): 213 SOLUTION TOPICAL at 06:10

## 2023-05-24 RX ADMIN — HEPARIN SODIUM 5000 UNIT(S): 5000 INJECTION INTRAVENOUS; SUBCUTANEOUS at 05:48

## 2023-05-24 RX ADMIN — FENTANYL CITRATE 25 MICROGRAM(S): 50 INJECTION INTRAVENOUS at 10:40

## 2023-05-24 RX ADMIN — Medication 216 GRAM(S): at 18:37

## 2023-05-24 RX ADMIN — Medication 100 MILLIEQUIVALENT(S): at 10:18

## 2023-05-24 RX ADMIN — Medication 216 GRAM(S): at 05:49

## 2023-05-24 RX ADMIN — CHLORHEXIDINE GLUCONATE 15 MILLILITER(S): 213 SOLUTION TOPICAL at 18:37

## 2023-05-24 RX ADMIN — Medication 216 GRAM(S): at 01:50

## 2023-05-24 RX ADMIN — Medication 216 GRAM(S): at 23:29

## 2023-05-24 RX ADMIN — Medication 125 MILLILITER(S): at 22:30

## 2023-05-24 RX ADMIN — HEPARIN SODIUM 5000 UNIT(S): 5000 INJECTION INTRAVENOUS; SUBCUTANEOUS at 13:19

## 2023-05-24 RX ADMIN — FENTANYL CITRATE 25 MICROGRAM(S): 50 INJECTION INTRAVENOUS at 22:00

## 2023-05-24 RX ADMIN — MEROPENEM 100 MILLIGRAM(S): 1 INJECTION INTRAVENOUS at 13:19

## 2023-05-24 RX ADMIN — LACOSAMIDE 150 MILLIGRAM(S): 50 TABLET ORAL at 22:29

## 2023-05-24 RX ADMIN — CASPOFUNGIN ACETATE 260 MILLIGRAM(S): 7 INJECTION, POWDER, LYOPHILIZED, FOR SOLUTION INTRAVENOUS at 10:30

## 2023-05-24 RX ADMIN — MEROPENEM 100 MILLIGRAM(S): 1 INJECTION INTRAVENOUS at 21:13

## 2023-05-24 RX ADMIN — FENTANYL CITRATE 25 MICROGRAM(S): 50 INJECTION INTRAVENOUS at 18:30

## 2023-05-24 RX ADMIN — MIDAZOLAM HYDROCHLORIDE 1.4 MG/KG/HR: 1 INJECTION, SOLUTION INTRAMUSCULAR; INTRAVENOUS at 21:13

## 2023-05-24 RX ADMIN — Medication 216 GRAM(S): at 11:48

## 2023-05-24 RX ADMIN — CHLORHEXIDINE GLUCONATE 15 MILLILITER(S): 213 SOLUTION TOPICAL at 05:48

## 2023-05-24 RX ADMIN — FENTANYL CITRATE 25 MICROGRAM(S): 50 INJECTION INTRAVENOUS at 18:45

## 2023-05-24 RX ADMIN — PANTOPRAZOLE SODIUM 40 MILLIGRAM(S): 20 TABLET, DELAYED RELEASE ORAL at 11:58

## 2023-05-24 NOTE — PROGRESS NOTE ADULT - SUBJECTIVE AND OBJECTIVE BOX
INFECTIOUS DISEASES CONSULT FOLLOW-UP NOTE    INTERVAL HPI/OVERNIGHT EVENTS:      ROS:   Constitutional, eyes, ENT, cardiovascular, respiratory, gastrointestinal, genitourinary, integumentary, neurological, psychiatric and heme/lymph are otherwise negative other than noted above       ANTIBIOTICS/RELEVANT:    MEDICATIONS  (STANDING):  albumin human 25% IVPB 50 milliLiter(s) IV Intermittent every 30 minutes  ampicillin  IVPB 2 Gram(s) IV Intermittent every 6 hours  aspirin  chewable 81 milliGRAM(s) Oral daily  caspofungin IVPB      caspofungin IVPB 50 milliGRAM(s) IV Intermittent every 24 hours  chlorhexidine 0.12% Liquid 15 milliLiter(s) Oral Mucosa every 12 hours  chlorhexidine 2% Cloths 1 Application(s) Topical daily  cisatracurium Infusion 3 MICROgram(s)/kG/Min (12.6 mL/Hr) IV Continuous <Continuous>  CRRT Treatment    <Continuous>  dexMEDEtomidine Infusion 0.4 MICROgram(s)/kG/Hr (7 mL/Hr) IV Continuous <Continuous>  heparin   Injectable 5000 Unit(s) SubCutaneous every 8 hours  lacosamide IVPB 250 milliGRAM(s) IV Intermittent every 12 hours  meropenem  IVPB 1000 milliGRAM(s) IV Intermittent every 8 hours  midazolam Infusion 0.02 mG/kG/Hr (1.4 mL/Hr) IV Continuous <Continuous>  pantoprazole  Injectable 40 milliGRAM(s) IV Push daily  Parenteral Nutrition - Adult 1 Each (70 mL/Hr) TPN Continuous <Continuous>  Parenteral Nutrition - Adult 1 Each (70 mL/Hr) TPN Continuous <Continuous>  PureFlow Dialysate RFP-400 (K 2 / Ca 3) 5000 milliLiter(s) (2000 mL/Hr) CRRT <Continuous>  sodium chloride 0.9%. 1000 milliLiter(s) (10 mL/Hr) IV Continuous <Continuous>    MEDICATIONS  (PRN):  fentaNYL    Injectable 25 MICROGram(s) IV Push every 3 hours PRN Severe Pain (7 - 10)        Vital Signs Last 24 Hrs  T(C): 35.2 (24 May 2023 08:51), Max: 36.6 (23 May 2023 18:25)  T(F): 95.4 (24 May 2023 08:51), Max: 97.9 (23 May 2023 18:25)  HR: 110 (24 May 2023 10:00) (55 - 141)  BP: --  BP(mean): --  RR: 22 (24 May 2023 10:00) (22 - 80)  SpO2: 100% (24 May 2023 10:00) (58% - 100%)    Parameters below as of 24 May 2023 10:00  Patient On (Oxygen Delivery Method): ventilator        05-23-23 @ 07:01  -  05-24-23 @ 07:00  --------------------------------------------------------  IN: 4488.6 mL / OUT: 4283 mL / NET: 205.6 mL    05-24-23 @ 07:01  -  05-24-23 @ 10:54  --------------------------------------------------------  IN: 307.8 mL / OUT: 250 mL / NET: 57.8 mL      PHYSICAL EXAM:  Constitutional: alert, NAD  Eyes: the sclera and conjunctiva were normal.   ENT: the ears and nose were normal in appearance.   Neck: the appearance of the neck was normal and the neck was supple.   Pulmonary: no respiratory distress and lungs were clear to auscultation bilaterally.   Heart: heart rate was normal and rhythm regular, normal S1 and S2  Vascular:. there was no peripheral edema  Abdomen: normal bowel sounds, soft, non-tender  Neurological: no focal deficits.   Psychiatric: the affect was normal        LABS:                        10.9   59.11 )-----------( 112      ( 24 May 2023 10:41 )             30.4     05-24    132<L>  |  99  |  49<H>  ----------------------------<  231<H>  3.9   |  23  |  0.89    Ca    9.0      24 May 2023 04:52  Phos  3.2     05-24  Mg     2.1     05-24    TPro  5.9<L>  /  Alb  2.9<L>  /  TBili  10.0<H>  /  DBili  x   /  AST  52<H>  /  ALT  25  /  AlkPhos  84  05-24    PT/INR - ( 24 May 2023 04:52 )   PT: 14.8 sec;   INR: 1.24          PTT - ( 24 May 2023 04:52 )  PTT:30.8 sec      MICROBIOLOGY:      RADIOLOGY & ADDITIONAL STUDIES:  Reviewed

## 2023-05-24 NOTE — PROGRESS NOTE ADULT - ASSESSMENT
This is a 54yo Male pt with PMH HTN, type A aortic dissection s/p Dacron grafts, AV resuspension in 2013, CAD s/p CABG x 1 SVG to RCA (5/2013 with Dr. Medina), seizure disorder (last episode on 7/4/22) S/P replacement of transverse aortic arch, second stage thoracic endovascular aortic repair, aorto-axillary bypass, AV replacement (bio 23mm), in APr 2023 and CABG x 1 (SVG-RCA) EF 75% (Ignacio, 3/20) now s/p 2nd state TEVAR on 5/5 for whom surgery was consulted for finding of acute pancreatitis with large peripancreatic/perigastric fluid collections on CTA abdomen 5/8 then acute hemorrhage starting 5/12/23 requiring 11 U pRBC over last 48 hours but with negative mesenteric angiogram 5/13/23 for whom hepatobiliary surgery was reconsulted for possible hemorrhagic pancreatitis.    - Appreciate IR recommendations for possible percutaneous drainage of peripancreatic necrosis today.   - Would keep NPO with TPN support given bilious NGT output.  - May consider repeat imaging in next few days, pending further discussion.  - Trend H&H regularly.   - Appreciate excellent care per primary team. Hepatobiliary surgery (Team 1C) continuing to follow.

## 2023-05-24 NOTE — PROGRESS NOTE ADULT - SUBJECTIVE AND OBJECTIVE BOX
INTERVAL COURSE  worsening clinical pic/ WBC count up to 50K, Pancreatic fluid growing Klebsiella  PEA arrest overnight   Pancreatic collection drained by IR at bedside in am   Remained sedated and paralyzed   Ongoing CVVHD for Anuria     ICU Vital Signs Last 24 Hrs  T(C): 36.1 (24 May 2023 13:59), Max: 36.6 (23 May 2023 18:25)  T(F): 97 (24 May 2023 13:59), Max: 97.9 (23 May 2023 18:25)  HR: 104 (24 May 2023 14:00) (55 - 141)  ABP: 131/91 (24 May 2023 14:00) (36/12 - 248/148)  ABP(mean): 107 (24 May 2023 14:00) (21 - 194)  RR: 22 (24 May 2023 14:00) (22 - 80)  SpO2: 100% (24 May 2023 14:00) (58% - 100%)  O2 Parameters below as of 24 May 2023 14:00  Patient On (Oxygen Delivery Method): ventilator    O2 Concentration (%): 50      Adult Advanced Hemodynamics Last 24 Hrs  CVP(mm Hg): 14 (24 May 2023 14:00) (10 - 18)  CO: 5.9 (24 May 2023 14:00) (4.2 - 6.5)  CI: 3 (24 May 2023 14:00) (2.2 - 3.4)  SVR: 1259 (24 May 2023 14:00) (1002 - 1981)  SVRI: 2476 (24 May 2023 14:00) (4079 - 2043)  ABG - ( 24 May 2023 10:31 )  pH, Arterial: 7.42  pH, Blood: x     /  pCO2: 36    /  pO2: 91    / HCO3: 23    / Base Excess: -0.7  /  SaO2: 99.1        Mode: AC/ CMV (Assist Control/ Continuous Mandatory Ventilation)  RR (machine): 22  TV (machine): 500  FiO2: 50  PEEP: 5  ITime: 1  MAP: 11  PIP: 23    I&O's Summary  24 May 2023 07:01  -  24 May 2023 15:02  --------------------------------------------------------  IN: 1281.2 mL / OUT: 1846 mL / NET: -564.8 mL    PHYSICAL EXAM  General: sedated and paralyzed   Respiratory: rhocnhorous   Cardiovascular: Regular rhythm/rate  Gastrointestinal: firm/ distention unchanged   Extremities: Warm, anasarcic     MEDICATIONS  (STANDING):  albumin human 25% IVPB 50 milliLiter(s) IV Intermittent every 30 minutes  ampicillin  IVPB 2 Gram(s) IV Intermittent every 6 hours  aspirin  chewable 81 milliGRAM(s) Oral daily  caspofungin IVPB      caspofungin IVPB 50 milliGRAM(s) IV Intermittent every 24 hours  chlorhexidine 0.12% Liquid 15 milliLiter(s) Oral Mucosa every 12 hours  chlorhexidine 2% Cloths 1 Application(s) Topical daily  cisatracurium Infusion 3 MICROgram(s)/kG/Min (12.6 mL/Hr) IV Continuous <Continuous>  CRRT Treatment    <Continuous>  dexMEDEtomidine Infusion 0.4 MICROgram(s)/kG/Hr (7 mL/Hr) IV Continuous <Continuous>  heparin   Injectable 5000 Unit(s) SubCutaneous every 8 hours  lacosamide IVPB 250 milliGRAM(s) IV Intermittent every 12 hours  meropenem  IVPB 1000 milliGRAM(s) IV Intermittent every 8 hours  midazolam Infusion 0.02 mG/kG/Hr (1.4 mL/Hr) IV Continuous <Continuous>  pantoprazole  Injectable 40 milliGRAM(s) IV Push daily  Parenteral Nutrition - Adult 1 Each (70 mL/Hr) TPN Continuous <Continuous>  Parenteral Nutrition - Adult 1 Each (70 mL/Hr) TPN Continuous <Continuous>  Phoxillum Filtration BK 4 / 2.5 5000 milliLiter(s) (2000 mL/Hr) CRRT <Continuous>  sodium chloride 0.9%. 1000 milliLiter(s) (10 mL/Hr) IV Continuous <Continuous>    MEDICATIONS  (PRN):  fentaNYL    Injectable 25 MICROGram(s) IV Push every 3 hours PRN Severe Pain (7 - 10)      LABS                        10.9   59.11 )-----------( 112      ( 24 May 2023 10:41 )             30.4     05-24    133<L>  |  99  |  45<H>  ----------------------------<  228<H>  3.7   |  24  |  0.74    Ca    9.0      24 May 2023 10:41  Phos  2.3     05-24  Mg     2.1     05-24    TPro  6.0  /  Alb  3.0<L>  /  TBili  10.6<H>  /  DBili  x   /  AST  60<H>  /  ALT  26  /  AlkPhos  97  05-24    LIVER FUNCTIONS - ( 24 May 2023 10:41 )  Alb: 3.0 g/dL / Pro: 6.0 g/dL / ALK PHOS: 97 U/L / ALT: 26 U/L / AST: 60 U/L / GGT: x           PT/INR - ( 24 May 2023 10:41 )   PT: 15.1 sec;   INR: 1.27          PTT - ( 24 May 2023 10:41 )  PTT:32.8 sec    CARDIAC MARKERS ( 24 May 2023 10:41 )  x     / x     / 95 U/L / x     / x      CARDIAC MARKERS ( 22 May 2023 16:40 )  x     / x     / 31 U/L / x     / x        IMAGING/EKG/ETC  Reviewed.

## 2023-05-24 NOTE — PROGRESS NOTE ADULT - ASSESSMENT
51 YO Male, HTN aortic dissection previous admission with severe cardiogenic shock TREY requiring CVVHD presented o the ED with worsening epigastric pain CTA/P revealed continued endoleak hospital course complicated by necrotic pancreatitis now with non-oliguric TREY    #non-oliguric TREY  #necrotizing pancreatitis    recommend:  to c/w CVVHD as dnoy demonstrating adequate clearance and uop dropping   will switch to phoxillum given drop in phos   q8H BMP phos while on CVVHD  maintain MAP > 70 for adequate renal perfusion  strict i's and o's  renally dose abx egfr <15    discussed w/ CTICU team   Malika Garcia D.O  PGY 5 nephrology fellow  [phone]

## 2023-05-24 NOTE — PROGRESS NOTE ADULT - SUBJECTIVE AND OBJECTIVE BOX
Patient is a 53y Male seen and evaluated at bedside. on CVVHD patient nearly coded overnight -uop 50cc/24 hours ascitic fluid reaccumulated cx growing klebsiella to undergo pancreatic fluid drainage w. IR today remains paralyzed wbc count 59K  K 3.9 phos 3.2       Meds:    albumin human 25% IVPB 50 every 30 minutes  ampicillin  IVPB 2 every 6 hours  aspirin  chewable 81 daily  caspofungin IVPB    caspofungin IVPB 50 every 24 hours  chlorhexidine 0.12% Liquid 15 every 12 hours  chlorhexidine 2% Cloths 1 daily  cisatracurium Infusion 3 <Continuous>  CRRT Treatment  <Continuous>  dexMEDEtomidine Infusion 0.4 <Continuous>  fentaNYL    Injectable 25 every 3 hours PRN  heparin   Injectable 5000 every 8 hours  lacosamide IVPB 250 every 12 hours  meropenem  IVPB 1000 every 8 hours  midazolam Infusion 0.02 <Continuous>  pantoprazole  Injectable 40 daily  Parenteral Nutrition - Adult 1 <Continuous>  Parenteral Nutrition - Adult 1 <Continuous>  PureFlow Dialysate RFP-400 (K 2 / Ca 3) 5000 <Continuous>  sodium chloride 0.9%. 1000 <Continuous>      T(C): , Max: 36.6 (05-23-23 @ 18:25)  T(F): , Max: 97.9 (05-23-23 @ 18:25)  HR: 103 (05-24-23 @ 11:00)  BP: --  BP(mean): --  RR: 22 (05-24-23 @ 11:00)  SpO2: 100% (05-24-23 @ 11:00)  Wt(kg): --    05-23 @ 07:01  -  05-24 @ 07:00  --------------------------------------------------------  IN: 4488.6 mL / OUT: 4283 mL / NET: 205.6 mL    05-24 @ 07:01  -  05-24 @ 11:17  --------------------------------------------------------  IN: 307.8 mL / OUT: 250 mL / NET: 57.8 mL          Review of Systems:  unable to participate       PHYSICAL EXAM:  GENERAL: intubated sedated   CHEST/LUNG: mechacnial breath sounds BL  HEART: normal S1S2, RRR  ABDOMEN: tense; distended   EXTREMITIES: + edema Bl LE  ACCESS:R IJ HD cath       LABS:                        10.9   59.11 )-----------( 112      ( 24 May 2023 10:41 )             30.4     05-24    132<L>  |  99  |  49<H>  ----------------------------<  231<H>  3.9   |  23  |  0.89    Ca    9.0      24 May 2023 04:52  Phos  3.2     05-24  Mg     2.1     05-24    TPro  5.9<L>  /  Alb  2.9<L>  /  TBili  10.0<H>  /  DBili  x   /  AST  52<H>  /  ALT  25  /  AlkPhos  84  05-24      PT/INR - ( 24 May 2023 10:41 )   PT: 15.1 sec;   INR: 1.27          PTT - ( 24 May 2023 10:41 )  PTT:32.8 sec    Hemoglobin: 10.9 g/dL (05-24-23 @ 10:41)  Hemoglobin: 9.1 g/dL (05-24-23 @ 04:52)  Phosphorus Level, Serum: 3.2 mg/dL (05-24-23 @ 04:52)  Phosphorus Level, Serum: 3.9 mg/dL (05-24-23 @ 00:25)    Albumin, Serum: 2.9 g/dL (05-24-23 @ 04:52)  Albumin, Serum: 2.7 g/dL (05-24-23 @ 00:25)      CRRT Treatment:   Modality: CVVHD, Filter: NxStage CAR-505, Target Blood Flow: 300 mL/Min  Target Fluid Balance: Titrate to net NEGATIVE fluid balance, 50 mL/Hr (05-23-23 @ 13:44) [Active]        RADIOLOGY & ADDITIONAL STUDIES:

## 2023-05-24 NOTE — PROGRESS NOTE ADULT - SUBJECTIVE AND OBJECTIVE BOX
Pt seen and examined at bedside.  1 unit pRBC overnight for down-drifting Hgb. No obvious GIB  Afebrile. Remains sedated/intubated    Allergies    No Known Allergies    Intolerances        MEDICATIONS:  MEDICATIONS  (STANDING):  albumin human 25% IVPB 50 milliLiter(s) IV Intermittent every 30 minutes  ampicillin  IVPB 2 Gram(s) IV Intermittent every 6 hours  aspirin  chewable 81 milliGRAM(s) Oral daily  caspofungin IVPB      caspofungin IVPB 50 milliGRAM(s) IV Intermittent every 24 hours  chlorhexidine 0.12% Liquid 15 milliLiter(s) Oral Mucosa every 12 hours  chlorhexidine 2% Cloths 1 Application(s) Topical daily  cisatracurium Infusion 3 MICROgram(s)/kG/Min (12.6 mL/Hr) IV Continuous <Continuous>  CRRT Treatment    <Continuous>  dexMEDEtomidine Infusion 0.4 MICROgram(s)/kG/Hr (7 mL/Hr) IV Continuous <Continuous>  heparin   Injectable 5000 Unit(s) SubCutaneous every 8 hours  lacosamide IVPB 250 milliGRAM(s) IV Intermittent every 12 hours  meropenem  IVPB 1000 milliGRAM(s) IV Intermittent every 8 hours  midazolam Infusion 0.02 mG/kG/Hr (1.4 mL/Hr) IV Continuous <Continuous>  pantoprazole  Injectable 40 milliGRAM(s) IV Push daily  Parenteral Nutrition - Adult 1 Each (70 mL/Hr) TPN Continuous <Continuous>  PureFlow Dialysate RFP-400 (K 2 / Ca 3) 5000 milliLiter(s) (2000 mL/Hr) CRRT <Continuous>  sodium chloride 0.9%. 1000 milliLiter(s) (10 mL/Hr) IV Continuous <Continuous>    MEDICATIONS  (PRN):  fentaNYL    Injectable 25 MICROGram(s) IV Push every 3 hours PRN Severe Pain (7 - 10)      Vital Signs Last 24 Hrs  T(C): 35.4 (24 May 2023 05:43), Max: 36.6 (23 May 2023 18:25)  T(F): 95.7 (24 May 2023 05:43), Max: 97.9 (23 May 2023 18:25)  HR: 100 (24 May 2023 08:00) (55 - 141)  BP: --  BP(mean): --  RR: 22 (24 May 2023 08:00) (22 - 80)  SpO2: 97% (24 May 2023 08:00) (58% - 100%)    Parameters below as of 24 May 2023 08:00  Patient On (Oxygen Delivery Method): ventilator        05-23 @ 07:01  -  05-24 @ 07:00  --------------------------------------------------------  IN: 4418.6 mL / OUT: 3815 mL / NET: 603.6 mL        PHYSICAL EXAM:    General: No acute distress  Cardiac: Regular rate on tele  Pulm: On MV   Gastrointestinal:Non-distended.   Skin: Warm and dry. No obvious rash    LABS:  CBC Full  -  ( 24 May 2023 04:52 )  WBC Count : 53.27 K/uL  RBC Count : 2.89 M/uL  Hemoglobin : 9.1 g/dL  Hematocrit : 26.8 %  Platelet Count - Automated : 153 K/uL  Mean Cell Volume : 92.7 fl  Mean Cell Hemoglobin : 31.5 pg  Mean Cell Hemoglobin Concentration : 34.0 gm/dL  Auto Neutrophil # : x  Auto Lymphocyte # : x  Auto Monocyte # : x  Auto Eosinophil # : x  Auto Basophil # : x  Auto Neutrophil % : x  Auto Lymphocyte % : x  Auto Monocyte % : x  Auto Eosinophil % : x  Auto Basophil % : x    05-24    132<L>  |  99  |  49<H>  ----------------------------<  231<H>  3.9   |  23  |  0.89    Ca    9.0      24 May 2023 04:52  Phos  3.2     05-24  Mg     2.1     05-24    TPro  5.9<L>  /  Alb  2.9<L>  /  TBili  10.0<H>  /  DBili  x   /  AST  52<H>  /  ALT  25  /  AlkPhos  84  05-24    PT/INR - ( 24 May 2023 04:52 )   PT: 14.8 sec;   INR: 1.24          PTT - ( 24 May 2023 04:52 )  PTT:30.8 sec                  RADIOLOGY & ADDITIONAL STUDIES:

## 2023-05-24 NOTE — PROGRESS NOTE ADULT - ATTENDING COMMENTS
I agree with the fellow's findings and plans as written above with the following additions/amendments:    Seen and examined at bedside on CVVHD, continue as above, further recs as above

## 2023-05-24 NOTE — PROGRESS NOTE ADULT - ASSESSMENT
53 YO Male, current every day marijuana use, w/ PMHx of HTN, type A aortic dissection s/p Dacron grafts, AV resuspension in 2013, CAD s/p CABG x 1 SVG to RCA (5/2013 with Dr. Meidna), seizure disorder (last episode on 7/4/22) s/p TEVAR with Dr. Pierre 5/5 with post op leukocytosis and CT chest abd pelvis noting diffuse pancreatic enlargement and heterogenous attenuation predominantly in body and tail, with multiple intrapancreatic and peripancreatic fluid collections with well-defined enhancing wall, the largest fluid collection along stomach greater curvature 8.5 x 2 x 2 cm, communicates with inferior fluid collection measuring 5 x 3 x 3 cm with subsequent ICU course c/b acute onset of severe abdominal pain with repeat imaging noting enlarging and new left retroperitoneal hematoma in the left hemiabdomen and worsening hematoma involving the pancreas with hemoperitoneum now with klebsiella bacteremia    GI re-engaged by surgery team regarding role  of EUS guided drainage of abdominal fluid collections  S/p IR guided drainage of peripanc fluid collections with >7500 ml amylase.  Cx with klebsiella    Recommendations:  -Tentative plan for US guided drain by IR at bedside  -EGD with EUS guided internalized drain (ie axios stent cystgastrostomy) can be supported when stable for transport to endo  -Discussed with Dr. Jaqueline Potts DO  Gastroenterology Fellow  Pager: [phone]  After 5PM or on weekends, please contact Eric Hill  for fellow on call

## 2023-05-24 NOTE — PROGRESS NOTE ADULT - ASSESSMENT
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     INCOMPLETE    #Klebsiella Bacteremia  Klebsiella BSI i/s/o VAP and pancreatic necrosis/RP hemorrhage (adjacent to aortic vascular graft). f/u surveillance bcx 5/18, blood cultures X 2 from 5/19, sputum culture 5/19. Sputum culture from 5/19 with Klebsiella pneumoniae, Enterobacter cloacae (S shin, I erta), E. faecalis and Strep anginosus.     - Start trial of caspofungin in critically ill patient. 70mg loading dose today 5/23 and 50mg IV daily moving forward, TPN is risk factor for candidemia  - send C. diff diagnostic labs if having diarrhea  - f/u culture data  - c/w meropenem 1 g IV q8h and ampicillin 2 g IV q6h  - consult with Clinical Pharmacist for appropriate dose adjustments pending whether patient will be on CVVHD or not  - f/u surgery/GI plan  - advise against any fluoroquinolones due to increased risk of aortic aneurysmal rupture        Team 1 will continue to follow 52 yo male hx seizure disorder, aortic dissection s/p AVR, aortic graft revision 3/20/2023, second stage TEVAR 5/5/2023, course c/b peripancreatic/RP hemorrhage/hematoma formation.       #Klebsiella Bacteremia  Klebsiella BSI i/s/o VAP and pancreatic necrosis/RP hemorrhage (adjacent to aortic vascular graft). f/u surveillance bcx 5/18, blood cultures X 2 from 5/19, sputum culture 5/19. Sputum culture from 5/19 with Klebsiella pneumoniae, Enterobacter cloacae (S shin, I erta), E. faecalis and Strep anginosus.     - c/w trial of caspofungin 50mg IV daily, TPN is risk factor for candidemia  - send C. diff diagnostic labs if having diarrhea -> negative   - f/u culture data  - c/w meropenem 1 g IV q8h and ampicillin 2 g IV q6h  - consult with Clinical Pharmacist for appropriate dose adjustments pending whether patient will be on CVVHD or not  - f/u surgery/GI plan  - advise against any fluoroquinolones due to increased risk of aortic aneurysmal rupture        Team 1 will continue to follow 54 yo male hx seizure disorder, aortic dissection s/p AVR, aortic graft revision 3/20/2023, second stage TEVAR 5/5/2023, course c/b peripancreatic/RP hemorrhage/hematoma formation.        #Klebsiella Bacteremia  Klebsiella BSI i/s/o VAP and pancreatic necrosis/RP hemorrhage (adjacent to aortic vascular graft). f/u surveillance bcx 5/18, blood cultures X 2 from 5/19, sputum culture 5/19. Sputum culture from 5/19 with Klebsiella pneumoniae, Enterobacter cloacae (S shin, I erta), E. faecalis and Strep anginosus.     - c/w trial of caspofungin 50mg IV daily, TPN is risk factor for candidemia  - send C. diff diagnostic labs if having diarrhea -> negative   - f/u culture data  - c/w meropenem 1 g IV q8h and ampicillin 2 g IV q6h  - consult with Clinical Pharmacist for appropriate dose adjustments pending whether patient will be on CVVHD or not  - f/u surgery/GI plan  - advise against any fluoroquinolones due to increased risk of aortic aneurysmal rupture  - f/u IR recommendations  - f/u GI recommendations         Team 1 will continue to follow 54 yo male hx seizure disorder, aortic dissection s/p AVR, aortic graft revision 3/20/2023, second stage TEVAR 5/5/2023, course c/b peripancreatic/RP hemorrhage/hematoma formation.        #Klebsiella Bacteremia  Klebsiella BSI i/s/o VAP and pancreatic necrosis/RP hemorrhage (adjacent to aortic vascular graft). f/u surveillance bcx 5/18, blood cultures X 2 from 5/19, sputum culture 5/19. Sputum culture from 5/19 with Klebsiella pneumoniae, Enterobacter cloacae (S shin, I erta), E. faecalis and Strep anginosus.     - c/w trial of caspofungin 50mg IV daily, TPN is risk factor for candidemia  - f/u culture data  - c/w meropenem 1 g IV q8h and ampicillin 2 g IV q6h (dosed for CVVHD) plus empiric caspofungin 50mg IV q24h  - advise against any fluoroquinolones due to increased risk of aortic aneurysmal rupture  - f/u IR recommendations  - f/u GI recommendations         Team 1 will continue to follow

## 2023-05-24 NOTE — CONSULT NOTE ADULT - SUBJECTIVE AND OBJECTIVE BOX
54yo Male pt with PMH HTN, type A aortic dissection s/p Dacron grafts, AV resuspension in 2013, CAD s/p CABG x 1 SVG to RCA (5/2013 with Dr. Medina), seizure disorder (last episode on 7/4/22) S/P replacement of transverse aortic arch, second stage thoracic endovascular aortic repair, aorto-axillary bypass, AV replacement (bio 23mm), in APr 2023 and CABG x 1 (SVG-RCA) EF 75% ([place], 3/20) now s/p 2nd state TEVAR on 5/5 for whom surgery was consulted for finding of acute pancreatitis with large peripancreatic/perigastric fluid collections on CTA abdomen 5/8 then acute hemorrhage starting 5/12/23 requiring 11 U pRBC over last 48 hours but with negative mesenteric angiogram 5/13/23. S/p paracentesis and LUQ collection aspiration (no drain per surgery) on 5/22/23. LUQ collection growing Klebsiella pneumoniae. IR consulted for LUQ drain placement.      Clinical History: AAA    SWELLING OF EXTREMITY    No pertinent family history in first degree relatives    Handoff    MEWS Score    HTN (hypertension)    Aortic dissection    CAD (coronary artery disease)    Seizure disorder    Chronic thoracic aortic dissection    Ascites    Peripancreatic fluid collection    Chronic thoracic aortic dissection    Ascites    Peripancreatic fluid collection    Second stage thoracic endovascular aortic repair (TEVAR)    S/P aortic bifurcation bypass graft    S/P CABG x 1    SysAdmin_VstLnk        Meds:albumin human 25% IVPB 50 milliLiter(s) IV Intermittent every 30 minutes  ampicillin  IVPB 2 Gram(s) IV Intermittent every 6 hours  aspirin  chewable 81 milliGRAM(s) Oral daily  caspofungin IVPB 50 milliGRAM(s) IV Intermittent every 24 hours  caspofungin IVPB      chlorhexidine 0.12% Liquid 15 milliLiter(s) Oral Mucosa every 12 hours  chlorhexidine 2% Cloths 1 Application(s) Topical daily  cisatracurium Infusion 3 MICROgram(s)/kG/Min IV Continuous <Continuous>  CRRT Treatment    <Continuous>  dexMEDEtomidine Infusion 0.4 MICROgram(s)/kG/Hr IV Continuous <Continuous>  fentaNYL    Injectable 25 MICROGram(s) IV Push every 3 hours PRN  heparin   Injectable 5000 Unit(s) SubCutaneous every 8 hours  lacosamide IVPB 250 milliGRAM(s) IV Intermittent every 12 hours  meropenem  IVPB 1000 milliGRAM(s) IV Intermittent every 8 hours  midazolam Infusion 0.02 mG/kG/Hr IV Continuous <Continuous>  pantoprazole  Injectable 40 milliGRAM(s) IV Push daily  Parenteral Nutrition - Adult 1 Each TPN Continuous <Continuous>  PureFlow Dialysate RFP-400 (K 2 / Ca 3) 5000 milliLiter(s) CRRT <Continuous>  sodium chloride 0.9%. 1000 milliLiter(s) IV Continuous <Continuous>      Allergies:No Known Allergies        Labs:                           9.1    53.27 )-----------( 153      ( 24 May 2023 04:52 )             26.8     PT/INR - ( 24 May 2023 04:52 )   PT: 14.8 sec;   INR: 1.24          PTT - ( 24 May 2023 04:52 )  PTT:30.8 sec  05-24    132<L>  |  99  |  49<H>  ----------------------------<  231<H>  3.9   |  23  |  0.89    Ca    9.0      24 May 2023 04:52  Phos  3.2     05-24  Mg     2.1     05-24    TPro  5.9<L>  /  Alb  2.9<L>  /  TBili  10.0<H>  /  DBili  x   /  AST  52<H>  /  ALT  25  /  AlkPhos  84  05-24          Imaging Findings: reviewed

## 2023-05-24 NOTE — PROGRESS NOTE ADULT - SUBJECTIVE AND OBJECTIVE BOX
IDENTIFICATION: This is a 54yo Male pt with PMH HTN, type A aortic dissection s/p Dacron grafts, AV resuspension in 2013, CAD s/p CABG x 1 SVG to RCA (5/2013 with Dr. Medina), seizure disorder (last episode on 7/4/22) S/P replacement of transverse aortic arch, second stage thoracic endovascular aortic repair, aorto-axillary bypass, AV replacement (bio 23mm), in APr 2023 and CABG x 1 (SVG-RCA) EF 75% (Ignacio, 3/20) now s/p 2nd state TEVAR on 5/5 for whom surgery was consulted for finding of acute pancreatitis with large peripancreatic/perigastric fluid collections on CTA abdomen 5/8 then acute hemorrhage starting 5/12/23 requiring 11 U pRBC over last 48 hours but with negative mesenteric angiogram 5/13/23 for whom hepatobiliary surgery was reconsulted for possible hemorrhagic pancreatitis.    EVENTS:   - Remains intubated/sedated  - H&H stable.  - ABx Meropenem & ampicillin (from [facility]) . SputCx & BCx with Klebsiella 5/17. Repeat BALCx 5/19 with Enterococcus cloacae & E faecalis, Klebsiella but suspected ? gastric contaminant. Repeat Cx 5/20 & 5/21 NGTD. WBC uptrending.    SUBJECTIVE:   - Sedated    MEDICATIONS  (STANDING):  albumin human 25% IVPB 50 milliLiter(s) IV Intermittent every 30 minutes  ampicillin  IVPB 2 Gram(s) IV Intermittent every 6 hours  aspirin  chewable 81 milliGRAM(s) Oral daily  caspofungin IVPB      caspofungin IVPB 50 milliGRAM(s) IV Intermittent every 24 hours  chlorhexidine 0.12% Liquid 15 milliLiter(s) Oral Mucosa every 12 hours  chlorhexidine 2% Cloths 1 Application(s) Topical daily  cisatracurium Infusion 3 MICROgram(s)/kG/Min (12.6 mL/Hr) IV Continuous <Continuous>  CRRT Treatment    <Continuous>  dexMEDEtomidine Infusion 0.4 MICROgram(s)/kG/Hr (7 mL/Hr) IV Continuous <Continuous>  heparin   Injectable 5000 Unit(s) SubCutaneous every 8 hours  lacosamide IVPB 250 milliGRAM(s) IV Intermittent every 12 hours  meropenem  IVPB 1000 milliGRAM(s) IV Intermittent every 8 hours  midazolam Infusion 0.02 mG/kG/Hr (1.4 mL/Hr) IV Continuous <Continuous>  pantoprazole  Injectable 40 milliGRAM(s) IV Push daily  Parenteral Nutrition - Adult 1 Each (70 mL/Hr) TPN Continuous <Continuous>  Parenteral Nutrition - Adult 1 Each (70 mL/Hr) TPN Continuous <Continuous>  Phoxillum Filtration BK 4 / 2.5 5000 milliLiter(s) (2000 mL/Hr) CRRT <Continuous>  sodium chloride 0.9%. 1000 milliLiter(s) (10 mL/Hr) IV Continuous <Continuous>    MEDICATIONS  (PRN):  fentaNYL    Injectable 25 MICROGram(s) IV Push every 3 hours PRN Severe Pain (7 - 10)      Vital Signs Last 24 Hrs  T(C): 36.1 (24 May 2023 13:59), Max: 36.6 (23 May 2023 18:25)  T(F): 97 (24 May 2023 13:59), Max: 97.9 (23 May 2023 18:25)  HR: 108 (24 May 2023 16:00) (55 - 141)  BP: --  BP(mean): --  RR: 22 (24 May 2023 16:00) (22 - 80)  SpO2: 100% (24 May 2023 16:00) (58% - 100%)    Parameters below as of 24 May 2023 16:00  Patient On (Oxygen Delivery Method): ventilator    O2 Concentration (%): 50    Neurologic: Sedated  CV: Normal rate, regular rhythm  Pulm: Breathing comfortably on MV with equal chest rise.  Abd: MOderately distended; mild reaction to palpation despite sedation  : No Cantrell  Skin: No rashes  Extremities: No edema.  Psychiatric: Sedated      I&O's Detail    23 May 2023 07:01  -  24 May 2023 07:00  --------------------------------------------------------  IN:    Albumin 5%  - 250 mL: 750 mL    Cisatracurium: 441 mL    IV PiggyBack: 775 mL    Midazolam: 68 mL    Norepinephrine: 9.1 mL    PRBCs (Packed Red Blood Cells): 600 mL    sodium chloride 0.9%: 210 mL    TPN (Total Parenteral Nutrition): 1540 mL    Vasopressin: 95.5 mL  Total IN: 4488.6 mL    OUT:    Indwelling Catheter - Urethral (mL): 56 mL    Nasogastric/Oral tube (mL): 0 mL    Other (mL): 4227 mL  Total OUT: 4283 mL    Total NET: 205.6 mL      24 May 2023 07:01  -  24 May 2023 17:11  --------------------------------------------------------  IN:    Cisatracurium: 159.6 mL    IV PiggyBack: 200 mL    IV PiggyBack: 375 mL    Midazolam: 25.8 mL    sodium chloride 0.9%: 80 mL    TPN (Total Parenteral Nutrition): 630 mL  Total IN: 1470.4 mL    OUT:    Drain (mL): 70 mL    Indwelling Catheter - Urethral (mL): 5 mL    Nasogastric/Oral tube (mL): 50 mL    Other (mL): 120 mL    Other (mL): 1839 mL  Total OUT: 2084 mL    Total NET: -613.6 mL          LABS:                        10.3   59.26 )-----------( 96       ( 24 May 2023 16:39 )             29.2     05-24    135  |  100  |  x   ----------------------------<  x   4.2   |  x   |  x     Ca    9.0      24 May 2023 10:41  Phos  2.8     05-24  Mg     2.1     05-24    TPro  6.0  /  Alb  3.0<L>  /  TBili  10.6<H>  /  DBili  x   /  AST  60<H>  /  ALT  26  /  AlkPhos  97  05-24    PT/INR - ( 24 May 2023 16:39 )   PT: 16.2 sec;   INR: 1.36          PTT - ( 24 May 2023 16:39 )  PTT:36.0 sec

## 2023-05-24 NOTE — CONSULT NOTE ADULT - ASSESSMENT
Assessment: 52yo Male pt with PMH HTN, type A aortic dissection s/p Dacron grafts, AV resuspension in 2013, CAD s/p CABG x 1 SVG to RCA (5/2013 with Dr. Medina), seizure disorder (last episode on 7/4/22) S/P replacement of transverse aortic arch, second stage thoracic endovascular aortic repair, aorto-axillary bypass, AV replacement (bio 23mm), in APr 2023 and CABG x 1 (SVG-RCA) EF 75% (Ignacio, 3/20) now s/p 2nd state TEVAR on 5/5 for whom surgery was consulted for finding of acute pancreatitis with large peripancreatic/perigastric fluid collections on CTA abdomen 5/8 then acute hemorrhage starting 5/12/23 requiring 11 U pRBC over last 48 hours but with negative mesenteric angiogram 5/13/23. S/p paracentesis and LUQ collection aspiration (no drain per surgery) on 5/22/23. LUQ collection growing Klebsiella pneumoniae. IR consulted for LUQ drain placement.  Case reviewed with Dr. Turk, plan for bedside drainage.     Recommendations: Maintain NPO x 8 hours prior to procedure.    Communicated with: CTS team

## 2023-05-24 NOTE — PROGRESS NOTE ADULT - ASSESSMENT
52 M w/ PMHx of HTN, type A aortic dissection s/p Dacron grafts, AV resuspension in 2013, CAD s/p CABG x 1 SVG to RCA (5/2013 with Dr. Medina), seizure disorder who was admitted for surgical mgmt of progression of aneurysmal disease. Recent admission 3/20-4/14 during which patient underwent replacement of transverse aortic arch, second stage TEVAR and aorto-axillary bypass, AV replacement (bio 23mm), and CABG x 1 (SVG-RCA) EF 75% (Ignacio, 3/20). Post op cb severe cardiogenic and vasogenic shock, TREY requiring CVVHD and temporary dialysis. Camr off RRT and discharged home mid April.presented to [facility] ED 4/27 for syncope vs seizure episode at home. negative neuro work up for Seizures AED dose adjusted however imaging at that rime revealed recent graft endoleak, Admitted now for surgical mgmt.   S/p Second stage thoracic endovascular aortic repair  5/5 transient LE weakness, Improved   LD clamped and Dc's 5/8  Post op course complicated by acute hemorrhagic pancreatitis   Klebsiella bacteremia -Klebsiella and Enterococcus pneumonia/ Pancraetic fluid growing klebsiella as well  S/p IR drainage of pancreatic collection   A/p  No recent seizures, valproate titrated off given association with pancreatitis/ Vimpat  250 Mg IV BID  Deeply sedated and paralyzed on versed/ precedex and nimbex/ standing fentanyl pushes  Bacteremia with VAP and pancreatic fluid all growing klebsiella/ On shin- amp and caspo ( dose adjusted for CRRT)  Follow clx from pancreatic collection marlen   ID recs appreciated   Anuric TREY with severe anasarca-->continue CVVHD/ to adjust UF based on obligatory intake and hemodynamics for Neg FB if tolerated   On TPN/Sugars are rising- will calculate insulin req and add in PN/ Nimbex in D5%   Worsening of hyperbili/ direct US with small non obstructive gallstone--> Critical illness related stasis vs TPN hepatotoxicity   [facility] CVC and temp HD cath 5/22   [facility] 5/23   ICU ppx: Sq hep/ PPI and asp precaution     Patient remained critically ill and requires ICU care, prognosis guarded.   ATTENDING: I have personally and independently provided 90 Min of critical care services.

## 2023-05-25 LAB
ALBUMIN SERPL ELPH-MCNC: 2.6 G/DL — LOW (ref 3.3–5)
ALBUMIN SERPL ELPH-MCNC: 2.6 G/DL — LOW (ref 3.3–5)
ALBUMIN SERPL ELPH-MCNC: 2.7 G/DL — LOW (ref 3.3–5)
ALBUMIN SERPL ELPH-MCNC: 2.8 G/DL — LOW (ref 3.3–5)
ALP SERPL-CCNC: 84 U/L — SIGNIFICANT CHANGE UP (ref 40–120)
ALP SERPL-CCNC: 93 U/L — SIGNIFICANT CHANGE UP (ref 40–120)
ALP SERPL-CCNC: 96 U/L — SIGNIFICANT CHANGE UP (ref 40–120)
ALP SERPL-CCNC: 96 U/L — SIGNIFICANT CHANGE UP (ref 40–120)
ALT FLD-CCNC: 17 U/L — SIGNIFICANT CHANGE UP (ref 10–45)
ALT FLD-CCNC: 18 U/L — SIGNIFICANT CHANGE UP (ref 10–45)
ALT FLD-CCNC: 20 U/L — SIGNIFICANT CHANGE UP (ref 10–45)
ALT FLD-CCNC: SIGNIFICANT CHANGE UP (ref 10–45)
AMYLASE P1 CFR SERPL: 108 U/L — SIGNIFICANT CHANGE UP (ref 25–125)
AMYLASE P1 CFR SERPL: 74 U/L — SIGNIFICANT CHANGE UP (ref 25–125)
ANION GAP SERPL CALC-SCNC: 10 MMOL/L — SIGNIFICANT CHANGE UP (ref 5–17)
ANION GAP SERPL CALC-SCNC: 11 MMOL/L — SIGNIFICANT CHANGE UP (ref 5–17)
ANION GAP SERPL CALC-SCNC: 12 MMOL/L — SIGNIFICANT CHANGE UP (ref 5–17)
ANION GAP SERPL CALC-SCNC: 9 MMOL/L — SIGNIFICANT CHANGE UP (ref 5–17)
APTT BLD: 30.3 SEC — SIGNIFICANT CHANGE UP (ref 27.5–35.5)
APTT BLD: 31.7 SEC — SIGNIFICANT CHANGE UP (ref 27.5–35.5)
APTT BLD: 33.5 SEC — SIGNIFICANT CHANGE UP (ref 27.5–35.5)
AST SERPL-CCNC: 36 U/L — SIGNIFICANT CHANGE UP (ref 10–40)
AST SERPL-CCNC: 43 U/L — HIGH (ref 10–40)
AST SERPL-CCNC: SIGNIFICANT CHANGE UP (ref 10–40)
AST SERPL-CCNC: SIGNIFICANT CHANGE UP (ref 10–40)
BASE EXCESS BLDA CALC-SCNC: -2.5 MMOL/L — LOW (ref -2–3)
BASE EXCESS BLDV CALC-SCNC: -2.9 MMOL/L — LOW (ref -2–3)
BILIRUB SERPL-MCNC: 8.5 MG/DL — HIGH (ref 0.2–1.2)
BILIRUB SERPL-MCNC: 8.5 MG/DL — HIGH (ref 0.2–1.2)
BILIRUB SERPL-MCNC: 8.6 MG/DL — HIGH (ref 0.2–1.2)
BILIRUB SERPL-MCNC: 8.8 MG/DL — HIGH (ref 0.2–1.2)
BUN SERPL-MCNC: 42 MG/DL — HIGH (ref 7–23)
BUN SERPL-MCNC: 44 MG/DL — HIGH (ref 7–23)
BUN SERPL-MCNC: 46 MG/DL — HIGH (ref 7–23)
BUN SERPL-MCNC: 46 MG/DL — HIGH (ref 7–23)
CA-I SERPL-SCNC: 1.21 MMOL/L — SIGNIFICANT CHANGE UP (ref 1.15–1.33)
CALCIUM SERPL-MCNC: 7.8 MG/DL — LOW (ref 8.4–10.5)
CALCIUM SERPL-MCNC: 8.2 MG/DL — LOW (ref 8.4–10.5)
CALCIUM SERPL-MCNC: 8.3 MG/DL — LOW (ref 8.4–10.5)
CALCIUM SERPL-MCNC: 8.4 MG/DL — SIGNIFICANT CHANGE UP (ref 8.4–10.5)
CHLORIDE SERPL-SCNC: 101 MMOL/L — SIGNIFICANT CHANGE UP (ref 96–108)
CHLORIDE SERPL-SCNC: 101 MMOL/L — SIGNIFICANT CHANGE UP (ref 96–108)
CHLORIDE SERPL-SCNC: 102 MMOL/L — SIGNIFICANT CHANGE UP (ref 96–108)
CHLORIDE SERPL-SCNC: 104 MMOL/L — SIGNIFICANT CHANGE UP (ref 96–108)
CK SERPL-CCNC: 69 U/L — SIGNIFICANT CHANGE UP (ref 30–200)
CK SERPL-CCNC: SIGNIFICANT CHANGE UP (ref 30–200)
CO2 BLDA-SCNC: 24 MMOL/L — SIGNIFICANT CHANGE UP (ref 19–24)
CO2 BLDV-SCNC: 25.1 MMOL/L — SIGNIFICANT CHANGE UP (ref 22–26)
CO2 SERPL-SCNC: 21 MMOL/L — LOW (ref 22–31)
CO2 SERPL-SCNC: 22 MMOL/L — SIGNIFICANT CHANGE UP (ref 22–31)
CO2 SERPL-SCNC: 22 MMOL/L — SIGNIFICANT CHANGE UP (ref 22–31)
CO2 SERPL-SCNC: 23 MMOL/L — SIGNIFICANT CHANGE UP (ref 22–31)
CREAT SERPL-MCNC: 0.85 MG/DL — SIGNIFICANT CHANGE UP (ref 0.5–1.3)
CREAT SERPL-MCNC: 0.87 MG/DL — SIGNIFICANT CHANGE UP (ref 0.5–1.3)
CREAT SERPL-MCNC: 0.9 MG/DL — SIGNIFICANT CHANGE UP (ref 0.5–1.3)
CREAT SERPL-MCNC: 0.94 MG/DL — SIGNIFICANT CHANGE UP (ref 0.5–1.3)
CULTURE RESULTS: SIGNIFICANT CHANGE UP
CULTURE RESULTS: SIGNIFICANT CHANGE UP
EGFR: 101 ML/MIN/1.73M2 — SIGNIFICANT CHANGE UP
EGFR: 103 ML/MIN/1.73M2 — SIGNIFICANT CHANGE UP
EGFR: 103 ML/MIN/1.73M2 — SIGNIFICANT CHANGE UP
EGFR: 96 ML/MIN/1.73M2 — SIGNIFICANT CHANGE UP
GAS PNL BLDA: SIGNIFICANT CHANGE UP
GAS PNL BLDA: SIGNIFICANT CHANGE UP
GAS PNL BLDV: 133 MMOL/L — LOW (ref 136–145)
GAS PNL BLDV: SIGNIFICANT CHANGE UP
GLUCOSE SERPL-MCNC: 154 MG/DL — HIGH (ref 70–99)
GLUCOSE SERPL-MCNC: 185 MG/DL — HIGH (ref 70–99)
GLUCOSE SERPL-MCNC: 208 MG/DL — HIGH (ref 70–99)
GLUCOSE SERPL-MCNC: 208 MG/DL — HIGH (ref 70–99)
GRAM STN FLD: SIGNIFICANT CHANGE UP
HCO3 BLDA-SCNC: 22 MMOL/L — SIGNIFICANT CHANGE UP (ref 21–28)
HCO3 BLDV-SCNC: 24 MMOL/L — SIGNIFICANT CHANGE UP (ref 22–29)
HCT VFR BLD CALC: 17.6 % — CRITICAL LOW (ref 39–50)
HCT VFR BLD CALC: 18.4 % — CRITICAL LOW (ref 39–50)
HCT VFR BLD CALC: 22.2 % — LOW (ref 39–50)
HCT VFR BLD CALC: 25.5 % — LOW (ref 39–50)
HGB BLD CALC-MCNC: 7.1 G/DL — LOW (ref 12.6–17.4)
HGB BLD-MCNC: 6.4 G/DL — CRITICAL LOW (ref 13–17)
HGB BLD-MCNC: 6.7 G/DL — CRITICAL LOW (ref 13–17)
HGB BLD-MCNC: 8 G/DL — LOW (ref 13–17)
HGB BLD-MCNC: 9.3 G/DL — LOW (ref 13–17)
INR BLD: 1.22 — HIGH (ref 0.88–1.16)
INR BLD: 1.26 — HIGH (ref 0.88–1.16)
INR BLD: 1.42 — HIGH (ref 0.88–1.16)
ISTAT ARTERIAL BE: -2 MMOL/L — SIGNIFICANT CHANGE UP (ref -2–3)
ISTAT ARTERIAL BE: -2 MMOL/L — SIGNIFICANT CHANGE UP (ref -2–3)
ISTAT ARTERIAL BE: -3 MMOL/L — LOW (ref -2–3)
ISTAT ARTERIAL GLUCOSE: 113 MG/DL — HIGH (ref 70–99)
ISTAT ARTERIAL GLUCOSE: 180 MG/DL — HIGH (ref 70–99)
ISTAT ARTERIAL GLUCOSE: 183 MG/DL — HIGH (ref 70–99)
ISTAT ARTERIAL HCO3: 22 MMOL/L — SIGNIFICANT CHANGE UP (ref 22–26)
ISTAT ARTERIAL HCO3: 23 MMOL/L — SIGNIFICANT CHANGE UP (ref 22–26)
ISTAT ARTERIAL HCO3: 23 MMOL/L — SIGNIFICANT CHANGE UP (ref 22–26)
ISTAT ARTERIAL HEMATOCRIT: 23 % — LOW (ref 39–50)
ISTAT ARTERIAL HEMATOCRIT: 25 % — LOW (ref 39–50)
ISTAT ARTERIAL HEMATOCRIT: 28 % — LOW (ref 39–50)
ISTAT ARTERIAL HEMOGLOBIN: 7.8 G/DL — LOW (ref 13–17)
ISTAT ARTERIAL HEMOGLOBIN: 8.5 G/DL — LOW (ref 13–17)
ISTAT ARTERIAL HEMOGLOBIN: 9.5 G/DL — LOW (ref 13–17)
ISTAT ARTERIAL IONIZED CALCIUM: 1.14 MMOL/L — SIGNIFICANT CHANGE UP (ref 1.12–1.3)
ISTAT ARTERIAL IONIZED CALCIUM: 1.17 MMOL/L — SIGNIFICANT CHANGE UP (ref 1.12–1.3)
ISTAT ARTERIAL IONIZED CALCIUM: 1.19 MMOL/L — SIGNIFICANT CHANGE UP (ref 1.12–1.3)
ISTAT ARTERIAL PCO2: 39 MMHG — SIGNIFICANT CHANGE UP (ref 35–45)
ISTAT ARTERIAL PCO2: 39 MMHG — SIGNIFICANT CHANGE UP (ref 35–45)
ISTAT ARTERIAL PCO2: 40 MMHG — SIGNIFICANT CHANGE UP (ref 35–45)
ISTAT ARTERIAL PH: 7.37 — SIGNIFICANT CHANGE UP (ref 7.35–7.45)
ISTAT ARTERIAL PH: 7.37 — SIGNIFICANT CHANGE UP (ref 7.35–7.45)
ISTAT ARTERIAL PH: 7.38 — SIGNIFICANT CHANGE UP (ref 7.35–7.45)
ISTAT ARTERIAL PO2: 127 MMHG — HIGH (ref 80–105)
ISTAT ARTERIAL PO2: 66 MMHG — LOW (ref 80–105)
ISTAT ARTERIAL PO2: 70 MMHG — LOW (ref 80–105)
ISTAT ARTERIAL POTASSIUM: 4 MMOL/L — SIGNIFICANT CHANGE UP (ref 3.5–5.3)
ISTAT ARTERIAL POTASSIUM: 4.1 MMOL/L — SIGNIFICANT CHANGE UP (ref 3.5–5.3)
ISTAT ARTERIAL POTASSIUM: 4.2 MMOL/L — SIGNIFICANT CHANGE UP (ref 3.5–5.3)
ISTAT ARTERIAL SO2: 92 % — LOW (ref 95–98)
ISTAT ARTERIAL SO2: 93 % — LOW (ref 95–98)
ISTAT ARTERIAL SO2: 99 % — HIGH (ref 95–98)
ISTAT ARTERIAL SODIUM: 136 MMOL/L — SIGNIFICANT CHANGE UP (ref 135–145)
ISTAT ARTERIAL SODIUM: 136 MMOL/L — SIGNIFICANT CHANGE UP (ref 135–145)
ISTAT ARTERIAL SODIUM: 139 MMOL/L — SIGNIFICANT CHANGE UP (ref 135–145)
ISTAT ARTERIAL TCO2: 23 MMOL/L — SIGNIFICANT CHANGE UP (ref 22–31)
ISTAT ARTERIAL TCO2: 24 MMOL/L — SIGNIFICANT CHANGE UP (ref 22–31)
ISTAT ARTERIAL TCO2: 24 MMOL/L — SIGNIFICANT CHANGE UP (ref 22–31)
LACTATE SERPL-SCNC: 2.4 MMOL/L — HIGH (ref 0.5–2)
LIDOCAIN IGE QN: 253 U/L — HIGH (ref 7–60)
LIDOCAIN IGE QN: 275 U/L — HIGH (ref 7–60)
MAGNESIUM SERPL-MCNC: 2.2 MG/DL — SIGNIFICANT CHANGE UP (ref 1.6–2.6)
MAGNESIUM SERPL-MCNC: 2.4 MG/DL — SIGNIFICANT CHANGE UP (ref 1.6–2.6)
MAGNESIUM SERPL-MCNC: 2.6 MG/DL — SIGNIFICANT CHANGE UP (ref 1.6–2.6)
MCHC RBC-ENTMCNC: 31.8 PG — SIGNIFICANT CHANGE UP (ref 27–34)
MCHC RBC-ENTMCNC: 32 PG — SIGNIFICANT CHANGE UP (ref 27–34)
MCHC RBC-ENTMCNC: 32.1 PG — SIGNIFICANT CHANGE UP (ref 27–34)
MCHC RBC-ENTMCNC: 32.4 PG — SIGNIFICANT CHANGE UP (ref 27–34)
MCHC RBC-ENTMCNC: 36 GM/DL — SIGNIFICANT CHANGE UP (ref 32–36)
MCHC RBC-ENTMCNC: 36.4 GM/DL — HIGH (ref 32–36)
MCHC RBC-ENTMCNC: 36.4 GM/DL — HIGH (ref 32–36)
MCHC RBC-ENTMCNC: 36.5 GM/DL — HIGH (ref 32–36)
MCV RBC AUTO: 87.6 FL — SIGNIFICANT CHANGE UP (ref 80–100)
MCV RBC AUTO: 88 FL — SIGNIFICANT CHANGE UP (ref 80–100)
MCV RBC AUTO: 88.8 FL — SIGNIFICANT CHANGE UP (ref 80–100)
MCV RBC AUTO: 88.9 FL — SIGNIFICANT CHANGE UP (ref 80–100)
METHGB MFR BLDV: 2.1 % — HIGH
NRBC # BLD: 0 /100 WBCS — SIGNIFICANT CHANGE UP (ref 0–0)
PCO2 BLDA: 39 MMHG — SIGNIFICANT CHANGE UP (ref 35–48)
PCO2 BLDV: 47 MMHG — SIGNIFICANT CHANGE UP (ref 42–55)
PH BLDA: 7.37 — SIGNIFICANT CHANGE UP (ref 7.35–7.45)
PH BLDV: 7.31 — LOW (ref 7.32–7.43)
PHOSPHATE SERPL-MCNC: 3.2 MG/DL — SIGNIFICANT CHANGE UP (ref 2.5–4.5)
PHOSPHATE SERPL-MCNC: 3.3 MG/DL — SIGNIFICANT CHANGE UP (ref 2.5–4.5)
PHOSPHATE SERPL-MCNC: 3.7 MG/DL — SIGNIFICANT CHANGE UP (ref 2.5–4.5)
PLATELET # BLD AUTO: 70 K/UL — LOW (ref 150–400)
PLATELET # BLD AUTO: 82 K/UL — LOW (ref 150–400)
PLATELET # BLD AUTO: 89 K/UL — LOW (ref 150–400)
PLATELET # BLD AUTO: 97 K/UL — LOW (ref 150–400)
PO2 BLDA: 190 MMHG — HIGH (ref 83–108)
PO2 BLDV: 41 MMHG — SIGNIFICANT CHANGE UP (ref 25–45)
POTASSIUM BLDV-SCNC: 4.2 MMOL/L — SIGNIFICANT CHANGE UP (ref 3.5–5.1)
POTASSIUM SERPL-MCNC: 4.1 MMOL/L — SIGNIFICANT CHANGE UP (ref 3.5–5.3)
POTASSIUM SERPL-MCNC: 4.3 MMOL/L — SIGNIFICANT CHANGE UP (ref 3.5–5.3)
POTASSIUM SERPL-MCNC: 4.6 MMOL/L — SIGNIFICANT CHANGE UP (ref 3.5–5.3)
POTASSIUM SERPL-MCNC: SIGNIFICANT CHANGE UP (ref 3.5–5.3)
POTASSIUM SERPL-SCNC: 4.1 MMOL/L — SIGNIFICANT CHANGE UP (ref 3.5–5.3)
POTASSIUM SERPL-SCNC: 4.3 MMOL/L — SIGNIFICANT CHANGE UP (ref 3.5–5.3)
POTASSIUM SERPL-SCNC: 4.6 MMOL/L — SIGNIFICANT CHANGE UP (ref 3.5–5.3)
POTASSIUM SERPL-SCNC: SIGNIFICANT CHANGE UP (ref 3.5–5.3)
PROT SERPL-MCNC: 4.7 G/DL — LOW (ref 6–8.3)
PROT SERPL-MCNC: 4.8 G/DL — LOW (ref 6–8.3)
PROT SERPL-MCNC: 5 G/DL — LOW (ref 6–8.3)
PROT SERPL-MCNC: 5.5 G/DL — LOW (ref 6–8.3)
PROTHROM AB SERPL-ACNC: 14.5 SEC — HIGH (ref 10.5–13.4)
PROTHROM AB SERPL-ACNC: 15 SEC — HIGH (ref 10.5–13.4)
PROTHROM AB SERPL-ACNC: 16.9 SEC — HIGH (ref 10.5–13.4)
RBC # BLD: 2.01 M/UL — LOW (ref 4.2–5.8)
RBC # BLD: 2.09 M/UL — LOW (ref 4.2–5.8)
RBC # BLD: 2.5 M/UL — LOW (ref 4.2–5.8)
RBC # BLD: 2.87 M/UL — LOW (ref 4.2–5.8)
RBC # FLD: 17.3 % — HIGH (ref 10.3–14.5)
RBC # FLD: 17.4 % — HIGH (ref 10.3–14.5)
RBC # FLD: 17.5 % — HIGH (ref 10.3–14.5)
RBC # FLD: 17.6 % — HIGH (ref 10.3–14.5)
SAO2 % BLDA: 99 % — HIGH (ref 94–98)
SAO2 % BLDV: 62.9 % — LOW (ref 67–88)
SODIUM SERPL-SCNC: 133 MMOL/L — LOW (ref 135–145)
SODIUM SERPL-SCNC: 134 MMOL/L — LOW (ref 135–145)
SODIUM SERPL-SCNC: 135 MMOL/L — SIGNIFICANT CHANGE UP (ref 135–145)
SODIUM SERPL-SCNC: 136 MMOL/L — SIGNIFICANT CHANGE UP (ref 135–145)
SPECIMEN SOURCE: SIGNIFICANT CHANGE UP
TRIGL FLD-MCNC: 233 MG/DL — SIGNIFICANT CHANGE UP
TRIGL SERPL-MCNC: 177 MG/DL — HIGH
TRIGL SERPL-MCNC: 183 MG/DL — HIGH
WBC # BLD: 37.03 K/UL — HIGH (ref 3.8–10.5)
WBC # BLD: 40.12 K/UL — CRITICAL HIGH (ref 3.8–10.5)
WBC # BLD: 48.66 K/UL — CRITICAL HIGH (ref 3.8–10.5)
WBC # BLD: 50.09 K/UL — CRITICAL HIGH (ref 3.8–10.5)
WBC # FLD AUTO: 37.03 K/UL — HIGH (ref 3.8–10.5)
WBC # FLD AUTO: 40.12 K/UL — CRITICAL HIGH (ref 3.8–10.5)
WBC # FLD AUTO: 48.66 K/UL — CRITICAL HIGH (ref 3.8–10.5)
WBC # FLD AUTO: 50.09 K/UL — CRITICAL HIGH (ref 3.8–10.5)

## 2023-05-25 PROCEDURE — 49406 IMAGE CATH FLUID PERI/RETRO: CPT | Mod: 59

## 2023-05-25 PROCEDURE — 49423 EXCHANGE DRAINAGE CATHETER: CPT | Mod: 59

## 2023-05-25 PROCEDURE — 75984 XRAY CONTROL CATHETER CHANGE: CPT | Mod: 26

## 2023-05-25 RX ORDER — DOBUTAMINE HCL 250MG/20ML
2.5 VIAL (ML) INTRAVENOUS
Qty: 1000 | Refills: 0 | Status: DISCONTINUED | OUTPATIENT
Start: 2023-05-25 | End: 2023-05-26

## 2023-05-25 RX ORDER — MIDAZOLAM HYDROCHLORIDE 1 MG/ML
4 INJECTION, SOLUTION INTRAMUSCULAR; INTRAVENOUS ONCE
Refills: 0 | Status: DISCONTINUED | OUTPATIENT
Start: 2023-05-25 | End: 2023-05-25

## 2023-05-25 RX ORDER — VANCOMYCIN HCL 500 MG
5 VIAL (EA) INTRAVENOUS ONCE
Refills: 0 | Status: COMPLETED | OUTPATIENT
Start: 2023-05-25 | End: 2023-05-25

## 2023-05-25 RX ORDER — VASOPRESSIN 20 [USP'U]/ML
0.04 INJECTION INTRAVENOUS
Qty: 40 | Refills: 0 | Status: DISCONTINUED | OUTPATIENT
Start: 2023-05-25 | End: 2023-05-26

## 2023-05-25 RX ORDER — FENTANYL CITRATE 50 UG/ML
50 INJECTION INTRAVENOUS ONCE
Refills: 0 | Status: DISCONTINUED | OUTPATIENT
Start: 2023-05-25 | End: 2023-05-25

## 2023-05-25 RX ORDER — NOREPINEPHRINE BITARTRATE/D5W 8 MG/250ML
0.05 PLASTIC BAG, INJECTION (ML) INTRAVENOUS
Qty: 8 | Refills: 0 | Status: DISCONTINUED | OUTPATIENT
Start: 2023-05-25 | End: 2023-05-25

## 2023-05-25 RX ORDER — ALBUMIN HUMAN 25 %
50 VIAL (ML) INTRAVENOUS
Refills: 0 | Status: DISCONTINUED | OUTPATIENT
Start: 2023-05-25 | End: 2023-05-25

## 2023-05-25 RX ORDER — MIDAZOLAM HYDROCHLORIDE 1 MG/ML
0.02 INJECTION, SOLUTION INTRAMUSCULAR; INTRAVENOUS
Qty: 100 | Refills: 0 | Status: DISCONTINUED | OUTPATIENT
Start: 2023-05-25 | End: 2023-05-26

## 2023-05-25 RX ORDER — ELECTROLYTE SOLUTION,INJ
1 VIAL (ML) INTRAVENOUS
Refills: 0 | Status: DISCONTINUED | OUTPATIENT
Start: 2023-05-25 | End: 2023-05-25

## 2023-05-25 RX ORDER — CISATRACURIUM BESYLATE 2 MG/ML
10 INJECTION INTRAVENOUS ONCE
Refills: 0 | Status: COMPLETED | OUTPATIENT
Start: 2023-05-25 | End: 2023-05-25

## 2023-05-25 RX ORDER — FENTANYL CITRATE 50 UG/ML
0.1 INJECTION INTRAVENOUS
Qty: 2500 | Refills: 0 | Status: DISCONTINUED | OUTPATIENT
Start: 2023-05-25 | End: 2023-05-25

## 2023-05-25 RX ORDER — FENTANYL CITRATE 50 UG/ML
0.5 INJECTION INTRAVENOUS
Qty: 5000 | Refills: 0 | Status: DISCONTINUED | OUTPATIENT
Start: 2023-05-25 | End: 2023-05-26

## 2023-05-25 RX ADMIN — Medication 7.88 MICROGRAM(S)/KG/MIN: at 13:29

## 2023-05-25 RX ADMIN — MEROPENEM 100 MILLIGRAM(S): 1 INJECTION INTRAVENOUS at 21:48

## 2023-05-25 RX ADMIN — CHLORHEXIDINE GLUCONATE 1 APPLICATION(S): 213 SOLUTION TOPICAL at 06:03

## 2023-05-25 RX ADMIN — FENTANYL CITRATE 1.75 MICROGRAM(S)/KG/HR: 50 INJECTION INTRAVENOUS at 13:15

## 2023-05-25 RX ADMIN — FENTANYL CITRATE 50 MICROGRAM(S): 50 INJECTION INTRAVENOUS at 18:50

## 2023-05-25 RX ADMIN — Medication 216 GRAM(S): at 05:57

## 2023-05-25 RX ADMIN — Medication 216 GRAM(S): at 12:30

## 2023-05-25 RX ADMIN — Medication 216 GRAM(S): at 17:59

## 2023-05-25 RX ADMIN — MIDAZOLAM HYDROCHLORIDE 4 MILLIGRAM(S): 1 INJECTION, SOLUTION INTRAMUSCULAR; INTRAVENOUS at 18:50

## 2023-05-25 RX ADMIN — HEPARIN SODIUM 5000 UNIT(S): 5000 INJECTION INTRAVENOUS; SUBCUTANEOUS at 05:58

## 2023-05-25 RX ADMIN — FENTANYL CITRATE 25 MICROGRAM(S): 50 INJECTION INTRAVENOUS at 02:52

## 2023-05-25 RX ADMIN — Medication 50 MILLILITER(S): at 10:17

## 2023-05-25 RX ADMIN — Medication 50 MILLILITER(S): at 11:07

## 2023-05-25 RX ADMIN — LACOSAMIDE 150 MILLIGRAM(S): 50 TABLET ORAL at 23:08

## 2023-05-25 RX ADMIN — Medication 50 MILLILITER(S): at 09:13

## 2023-05-25 RX ADMIN — Medication 1 EACH: at 21:49

## 2023-05-25 RX ADMIN — Medication 1 APPLICATION(S): at 23:09

## 2023-05-25 RX ADMIN — HEPARIN SODIUM 5000 UNIT(S): 5000 INJECTION INTRAVENOUS; SUBCUTANEOUS at 21:48

## 2023-05-25 RX ADMIN — CISATRACURIUM BESYLATE 12.6 MICROGRAM(S)/KG/MIN: 2 INJECTION INTRAVENOUS at 16:28

## 2023-05-25 RX ADMIN — CHLORHEXIDINE GLUCONATE 15 MILLILITER(S): 213 SOLUTION TOPICAL at 18:00

## 2023-05-25 RX ADMIN — FENTANYL CITRATE 0.7 MICROGRAM(S)/KG/HR: 50 INJECTION INTRAVENOUS at 09:51

## 2023-05-25 RX ADMIN — MIDAZOLAM HYDROCHLORIDE 1.4 MG/KG/HR: 1 INJECTION, SOLUTION INTRAMUSCULAR; INTRAVENOUS at 12:42

## 2023-05-25 RX ADMIN — PANTOPRAZOLE SODIUM 40 MILLIGRAM(S): 20 TABLET, DELAYED RELEASE ORAL at 12:30

## 2023-05-25 RX ADMIN — FENTANYL CITRATE 50 MICROGRAM(S): 50 INJECTION INTRAVENOUS at 19:05

## 2023-05-25 RX ADMIN — CHLORHEXIDINE GLUCONATE 15 MILLILITER(S): 213 SOLUTION TOPICAL at 05:57

## 2023-05-25 RX ADMIN — FENTANYL CITRATE 25 MICROGRAM(S): 50 INJECTION INTRAVENOUS at 03:22

## 2023-05-25 RX ADMIN — Medication 1 APPLICATION(S): at 18:00

## 2023-05-25 RX ADMIN — MEROPENEM 100 MILLIGRAM(S): 1 INJECTION INTRAVENOUS at 14:43

## 2023-05-25 RX ADMIN — LACOSAMIDE 150 MILLIGRAM(S): 50 TABLET ORAL at 13:15

## 2023-05-25 RX ADMIN — MEROPENEM 100 MILLIGRAM(S): 1 INJECTION INTRAVENOUS at 05:57

## 2023-05-25 RX ADMIN — Medication 800 GRAM(S): at 01:30

## 2023-05-25 RX ADMIN — CASPOFUNGIN ACETATE 260 MILLIGRAM(S): 7 INJECTION, POWDER, LYOPHILIZED, FOR SOLUTION INTRAVENOUS at 11:00

## 2023-05-25 RX ADMIN — CISATRACURIUM BESYLATE 10 MILLIGRAM(S): 2 INJECTION INTRAVENOUS at 09:50

## 2023-05-25 RX ADMIN — CISATRACURIUM BESYLATE 12.6 MICROGRAM(S)/KG/MIN: 2 INJECTION INTRAVENOUS at 20:00

## 2023-05-25 NOTE — PROGRESS NOTE ADULT - ATTENDING COMMENTS
Agree with above.  He remains critically ill.  WBC at 50 but decreasing.  Maintain broad coverage with Caspofungin, Ampicillin and Meropenem

## 2023-05-25 NOTE — PROGRESS NOTE ADULT - ASSESSMENT
51 YO Male, HTN aortic dissection previous admission with severe cardiogenic shock TREY requiring CVVHD presented o the ED with worsening epigastric pain CTA/P revealed continued endoleak hospital course complicated by necrotic pancreatitis now with non-oliguric TREY    #non-oliguric TREY  #necrotizing pancreatitis    recommend:  to c/w CVVHD for UF and clearance   will c/w phoxillum as phos low-normal  Qb 300ml/min DFR 2L/hour UF net negative 300ml/hour (uptitrate accordingly as per CTICU team)   q8H BMP phos while on CVVHD  maintain MAP > 70 for adequate renal perfusion  strict i's and o's  renally dose abx egfr <15    discussed w/ CTICU team   Malika Garcia D.O  PGY 5 nephrology fellow  [phone]

## 2023-05-25 NOTE — PROGRESS NOTE ADULT - SUBJECTIVE AND OBJECTIVE BOX
Patient is a 53y Male seen and evaluated at bedside. patient remains intubated seated on CVVHD tolerating net negative 200ml/hour on epi and dobutamine remains anuric tachycardiac wbc 48K K 4.3 bicarb 23  had pancreatic fluid drained yesterday with IR       Meds:    albumin human 25% IVPB 50 every 1 hour  albumin human 25% IVPB 50 every 30 minutes  ampicillin  IVPB 2 every 6 hours  aspirin  chewable 81 daily  caspofungin IVPB    caspofungin IVPB 50 every 24 hours  chlorhexidine 0.12% Liquid 15 every 12 hours  chlorhexidine 2% Cloths 1 daily  cisatracurium Infusion 3 <Continuous>  CRRT Treatment  <Continuous>  dexMEDEtomidine Infusion 0.4 <Continuous>  DOBUTamine Infusion 5 <Continuous>  EPINEPHrine    Infusion 0.033 <Continuous>  fentaNYL    Injectable 25 every 3 hours PRN  fentaNYL   Infusion. 0.1 <Continuous>  heparin   Injectable 5000 every 8 hours  lacosamide IVPB 250 every 12 hours  meropenem  IVPB 1000 every 8 hours  midazolam Infusion 0.02 <Continuous>  pantoprazole  Injectable 40 daily  Parenteral Nutrition - Adult 1 <Continuous>  Parenteral Nutrition - Adult 1 <Continuous>  Phoxillum Filtration BK 4 / 2.5 5000 <Continuous>  sodium chloride 0.9%. 1000 <Continuous>      T(C): , Max: 37.2 (05-25-23 @ 05:17)  T(F): , Max: 99 (05-25-23 @ 05:17)  HR: 122 (05-25-23 @ 10:00)  BP: --  BP(mean): --  RR: 22 (05-25-23 @ 10:00)  SpO2: 99% (05-25-23 @ 10:00)  Wt(kg): --    05-24 @ 07:01  -  05-25 @ 07:00  --------------------------------------------------------  IN: 3947.2 mL / OUT: 4271 mL / NET: -323.8 mL    05-25 @ 07:01  -  05-25 @ 10:30  --------------------------------------------------------  IN: 107 mL / OUT: 10 mL / NET: 97 mL          Review of Systems:  unable to participate       PHYSICAL EXAM:  GENERAL: intubated sedated   CHEST/LUNG: mechanical breath sounds L  HEART: normal S1S2, RRR  ABDOMEN: firm   EXTREMITIES: + edmea BL LE   SKIN: no rashes or lesions   ACCESS: R IJ HD cath (placed 5/22)      LABS:                        8.0    48.66 )-----------( 97       ( 25 May 2023 09:26 )             22.2     05-25    136  |  104  |  42<H>  ----------------------------<  185<H>  4.3   |  23  |  0.90    Ca    8.3<L>      25 May 2023 09:26  Phos  3.3     05-25  Mg     2.4     05-25    TPro  5.5<L>  /  Alb  2.8<L>  /  TBili  8.5<H>  /  DBili  x   /  AST  43<H>  /  ALT  20  /  AlkPhos  96  05-25      PT/INR - ( 25 May 2023 09:26 )   PT: 15.0 sec;   INR: 1.26          PTT - ( 25 May 2023 09:26 )  PTT:30.3 sec          RADIOLOGY & ADDITIONAL STUDIES:

## 2023-05-25 NOTE — PROGRESS NOTE ADULT - SUBJECTIVE AND OBJECTIVE BOX
54yo Male pt with PMH HTN, type A aortic dissection s/p Dacron grafts, AV resuspension in 2013, CAD s/p CABG x 1 SVG to RCA (5/2013 with Dr. Medina), seizure disorder (last episode on 7/4/22) S/P replacement of transverse aortic arch, second stage thoracic endovascular aortic repair, aorto-axillary bypass, AV replacement (bio 23mm), in APr 2023 and CABG x 1 (SVG-RCA) EF 75% (Ignacio, 3/20) now s/p 2nd state TEVAR on 5/5 for whom surgery was consulted for finding of acute pancreatitis with large peripancreatic/perigastric fluid collections on CTA abdomen 5/8 then acute hemorrhage starting 5/12/23 requiring 11 U pRBC over last 48 hours but with negative mesenteric angiogram 5/13/23. S/p paracentesis and LUQ collection aspiration (no drain per surgery) on 5/22/23. LUQ collection growing Klebsiella pneumoniae. S/p LUQ drain placement by IR 5/24/23.     LUQ drain with 320cc sanguinous output since placement. Flushes easily with sterile NS. Dressing c/d/i.     Continue to monitor drain output.

## 2023-05-25 NOTE — PROGRESS NOTE ADULT - ATTENDING COMMENTS
I agree with the fellow's findings and plans as written above with the following additions/amendments:    Seen and examined at bedside on CVVHD, tolerating but with high pressor requirements, continue CVVHD as above, further recs as above

## 2023-05-25 NOTE — CONSULT NOTE ADULT - SUBJECTIVE AND OBJECTIVE BOX
HPI: 53yMale    PMH:     MEDS:  Allergies    No Known Allergies    Intolerances        ICU Vital Signs Last 24 Hrs  T(F): 99 (05-25-23 @ 05:17), Max: 99 (05-25-23 @ 05:17)  HR: 118 (05-25-23 @ 12:00) (87 - 130)  BP: --  BP(mean): --  ABP: 108/65 (05-25-23 @ 12:00)  RR: 22 (05-25-23 @ 12:00) (22 - 31)  SpO2: 100% (05-25-23 @ 12:00) (92% - 100%)    PHYSICAL EXAM:   Neurological: AAOx3, CNII-XII intact,  strength 5/5 b/l  ENT: mucus membrane moist  Cardiovascular: RRR  Respiratory: CTA  Gastrointestinal: soft, NT, ND, BS+  Extremities: warm, no dependent edema  Vascular: no cyanosis/erythema  Skin: no rashes  MSK: no joint swelling.   LABS:    05-25    136  |  104  |  42<H>  ----------------------------<  185<H>  4.3   |  23  |  0.90    Ca    8.3<L>      25 May 2023 09:26  Phos  3.3     05-25  Mg     2.4     05-25    TPro  5.5<L>  /  Alb  2.8<L>  /  TBili  8.5<H>  /  DBili  x   /  AST  43<H>  /  ALT  20  /  AlkPhos  96  05-25  LIVER FUNCTIONS - ( 25 May 2023 09:26 )  Alb: 2.8 g/dL / Pro: 5.5 g/dL / ALK PHOS: 96 U/L / ALT: 20 U/L / AST: 43 U/L / GGT: x                               8.0    48.66 )-----------( 97       ( 25 May 2023 09:26 )             22.2   PT/INR - ( 25 May 2023 09:26 )   PT: 15.0 sec;   INR: 1.26          PTT - ( 25 May 2023 09:26 )  PTT:30.3 secCARDIAC MARKERS ( 25 May 2023 09:26 )  x     / x     / 69 U/L / x     / x      CARDIAC MARKERS ( 25 May 2023 02:11 )  x     / x     / See Note / x     / x      CARDIAC MARKERS ( 24 May 2023 22:19 )  x     / x     / 62 U/L / x     / x      CARDIAC MARKERS ( 24 May 2023 16:39 )  x     / x     / 78 U/L / x     / x      CARDIAC MARKERS ( 24 May 2023 10:41 )  x     / x     / 95 U/L / x     / x        ABG - ( 25 May 2023 09:42 )  pH, Arterial: 7.36  pH, Blood: x     /  pCO2: 40    /  pO2: 69    / HCO3: 23    / Base Excess: -2.7  /  SaO2: 94.6            CAPILLARY BLOOD GLUCOSE      POCT Blood Glucose.: 195 mg/dL (24 May 2023 22:07)    Cantrell:	  [ ] None	[ ] Daily Cantrell Order Placed	   Indication:	  [ ] Strict I and O's    [ ] Obstruction     [ ] Incontinence + Stage 3 or 4 Decubitus  Central Line:  [ ] None	   [ ]  Medication / TPN Administration     [ ] No Peripheral IV          HPI: 53yMale w/ PMHx of HTN, type A aortic dissection s/p Dacron grafts, AV resuspension in 2013, CAD s/p CABG x 1 SVG to RCA (5/2013 with Dr. Medina), seizure disorder who was admitted for surgical mgmt of progression of aneurysmal disease. Recent admission 3/20-4/14 during which patient underwent replacement of transverse aortic arch, second stage TEVAR and aorto-axillary bypass, AV replacement (bio 23mm), and CABG x 1 (SVG-RCA) EF 75% (Ignacio, 3/20). Post op cb severe cardiogenic and vasogenic shock, TREY requiring CVVHD and temporary dialysis. Camr off RRT and discharged home mid April.presented to [facility] ED 4/27 for syncope vs seizure episode at home. negative neuro work up for Seizures AED dose adjusted however imaging at that time revealed recent graft endoleak, pt admitted on 5/5 for surgical mgmt. He went to OR for Second stage TEVAR on 5/5, post op course c/b acute hemorrhagic pancreatitis, Klebsiella bacteremia -Klebsiella and Enterococcus pneumonia/ Pancraetic fluid also growing klebsiella, s/p IR drainage of pancreatic collection, IR  left upper quadrant abscess with placement of 12 F drainage catheter 5/24 (Rosalee), Aspiration of left abdominal fluid collection and paracentesis (4L clear yellow fluid) on 5/22 5/13: IR Celiac, SMA, splenic, and left gastric artery angiogram reveals no pseudoaneurysm or any active bleeding.  5/22:  IR Aspiration of left peripancreatic fluid collection  (15cc sanguinous) and paracentesis (4L clear yellow fluid)  5/24: IR L peripancreatic abscess drainage and placement of 12F drainage catheter    pancreatic fluid cx 5/22 pan-sensitive klebsiella  peritoneal ascites cx negative 5/22  bronch cx kleb, enterobacter (zosyn, erta resistant), E faecalis, strep angionosus 5/19 5/17 klebsiella bacteremia (pan-sensitive), subsequent bld cx NGTD  5/17 sputum cx kleb and proteus      PMH:     MEDS:  Allergies    No Known Allergies    Intolerances        ICU Vital Signs Last 24 Hrs  T(F): 99 (05-25-23 @ 05:17), Max: 99 (05-25-23 @ 05:17)  HR: 118 (05-25-23 @ 12:00) (87 - 130)  BP: --  BP(mean): --  ABP: 108/65 (05-25-23 @ 12:00)  RR: 22 (05-25-23 @ 12:00) (22 - 31)  SpO2: 100% (05-25-23 @ 12:00) (92% - 100%)    PHYSICAL EXAM:   Neurological: AAOx3, CNII-XII intact,  strength 5/5 b/l  ENT: mucus membrane moist  Cardiovascular: RRR  Respiratory: CTA  Gastrointestinal: soft, NT, ND, BS+  Extremities: warm, no dependent edema  Vascular: no cyanosis/erythema  Skin: no rashes  MSK: no joint swelling.   LABS:    05-25    136  |  104  |  42<H>  ----------------------------<  185<H>  4.3   |  23  |  0.90    Ca    8.3<L>      25 May 2023 09:26  Phos  3.3     05-25  Mg     2.4     05-25    TPro  5.5<L>  /  Alb  2.8<L>  /  TBili  8.5<H>  /  DBili  x   /  AST  43<H>  /  ALT  20  /  AlkPhos  96  05-25  LIVER FUNCTIONS - ( 25 May 2023 09:26 )  Alb: 2.8 g/dL / Pro: 5.5 g/dL / ALK PHOS: 96 U/L / ALT: 20 U/L / AST: 43 U/L / GGT: x                               8.0    48.66 )-----------( 97       ( 25 May 2023 09:26 )             22.2   PT/INR - ( 25 May 2023 09:26 )   PT: 15.0 sec;   INR: 1.26          PTT - ( 25 May 2023 09:26 )  PTT:30.3 secCARDIAC MARKERS ( 25 May 2023 09:26 )  x     / x     / 69 U/L / x     / x      CARDIAC MARKERS ( 25 May 2023 02:11 )  x     / x     / See Note / x     / x      CARDIAC MARKERS ( 24 May 2023 22:19 )  x     / x     / 62 U/L / x     / x      CARDIAC MARKERS ( 24 May 2023 16:39 )  x     / x     / 78 U/L / x     / x      CARDIAC MARKERS ( 24 May 2023 10:41 )  x     / x     / 95 U/L / x     / x        ABG - ( 25 May 2023 09:42 )  pH, Arterial: 7.36  pH, Blood: x     /  pCO2: 40    /  pO2: 69    / HCO3: 23    / Base Excess: -2.7  /  SaO2: 94.6            CAPILLARY BLOOD GLUCOSE      POCT Blood Glucose.: 195 mg/dL (24 May 2023 22:07)    Cantrell:	  [ ] None	[ ] Daily Cantrell Order Placed	   Indication:	  [ ] Strict I and O's    [ ] Obstruction     [ ] Incontinence + Stage 3 or 4 Decubitus  Central Line:  [ ] None	   [ ]  Medication / TPN Administration     [ ] No Peripheral IV          HPI: 53yMale w/ PMHx of HTN, type A aortic dissection s/p Dacron grafts, AV resuspension in 2013, CAD s/p CABG x 1 SVG to RCA (5/2013 with Dr. Medina), seizure disorder who was admitted for surgical mgmt of progression of aneurysmal disease. Recent admission 3/20-4/14 during which patient underwent replacement of transverse aortic arch, second stage TEVAR and aorto-axillary bypass, AV replacement (bio 23mm), and CABG x 1 (SVG-RCA) EF 75% (Brinster, 3/20). Post op cb severe cardiogenic and vasogenic shock, TREY requiring CVVHD and temporary dialysis. Camr off RRT and discharged home mid April.presented to [facility] ED 4/27 for syncope vs seizure episode at home. negative neuro work up for Seizures AED dose adjusted however imaging at that time revealed recent graft endoleak, pt admitted on 5/5 for surgical mgmt.   pt taken to OR for Second stage TEVAR on 5/5, post op course c/b Klebsiella bacteremia (5/15), Bronch 5/19 with kleb, enterobacter (zosyn, erta resistant), E faecalis, strep angionosus,  Pancraetic fluid also growing klebsiella, hemorrhagic pancreatitis of unclear etiology, with venu-pancreatic abscess (pansensitive kleb 5/22) s/p IR aspiration of peripancreatic fluid collection (15cc sanguinous) and paracentesis (4L clear yellow fluid, ascites cx negative) on 5/22, IR venu-pancreatic abscess drainage and placement of drainage catheter on 5/24.   pt now transferred from CTICU to SICU for further mgmt of hemorrhagic pancreatitis.     PROCEDURES/SURGERIES:  5/5: second stage TEVAR  5/13: IR Celiac, SMA, splenic, and left gastric artery angiogram reveals no pseudoaneurysm or any active bleeding.  5/22:  IR Aspiration of left peripancreatic fluid collection  (15cc sanguinous) and paracentesis (4L clear yellow fluid)  5/24: IR L peripancreatic abscess drainage and placement of 12F drainage catheter      pancreatic fluid cx 5/22 pan-sensitive klebsiella  peritoneal ascites cx negative 5/22  bronch cx kleb, enterobacter (zosyn, erta resistant), E faecalis, strep angionosus 5/19 5/17 klebsiella bacteremia (pan-sensitive), subsequent bld cx NGTD  5/17 sputum cx kleb and proteus      PMH: as above.     MEDS: current as meds while on CTICU service: dobutamine 7.5, versed gtts, fentanyl gtts, vasopressin 0.06, TPN, nimbex. protonix, caspo, meropenem, ampicillin  NKDA      ICU Vital Signs Last 24 Hrs  T(F): 99 (05-25-23 @ 05:17), Max: 99 (05-25-23 @ 05:17)  HR: 118 (05-25-23 @ 12:00) (87 - 130)  BP: --  BP(mean): --  ABP: 108/65 (05-25-23 @ 12:00)  RR: 22 (05-25-23 @ 12:00) (22 - 31)  SpO2: 100% (05-25-23 @ 12:00) (92% - 100%)    PHYSICAL EXAM:   Neurological: sedated and paralyzed on versed/fent/nimbex  ENT: mucus membrane moist  Cardiovascular: RRR  Respiratory: CTA  Gastrointestinal: soft, non distended, left side drain with bloody output.   Extremities: warm, anasarca  Vascular: no cyanosis/erythema  Skin: no rashes  MSK: no joint swelling.     LABS:    05-25    136  |  104  |  42<H>  ----------------------------<  185<H>  4.3   |  23  |  0.90    Ca    8.3<L>      25 May 2023 09:26  Phos  3.3     05-25  Mg     2.4     05-25    TPro  5.5<L>  /  Alb  2.8<L>  /  TBili  8.5<H>  /  DBili  x   /  AST  43<H>  /  ALT  20  /  AlkPhos  96  05-25  LIVER FUNCTIONS - ( 25 May 2023 09:26 )  Alb: 2.8 g/dL / Pro: 5.5 g/dL / ALK PHOS: 96 U/L / ALT: 20 U/L / AST: 43 U/L / GGT: x                               8.0    48.66 )-----------( 97       ( 25 May 2023 09:26 )             22.2   PT/INR - ( 25 May 2023 09:26 )   PT: 15.0 sec;   INR: 1.26          PTT - ( 25 May 2023 09:26 )  PTT:30.3 secCARDIAC MARKERS ( 25 May 2023 09:26 )  x     / x     / 69 U/L / x     / x      CARDIAC MARKERS ( 25 May 2023 02:11 )  x     / x     / See Note / x     / x      CARDIAC MARKERS ( 24 May 2023 22:19 )  x     / x     / 62 U/L / x     / x      CARDIAC MARKERS ( 24 May 2023 16:39 )  x     / x     / 78 U/L / x     / x      CARDIAC MARKERS ( 24 May 2023 10:41 )  x     / x     / 95 U/L / x     / x        ABG - ( 25 May 2023 09:42 )  pH, Arterial: 7.36  pH, Blood: x     /  pCO2: 40    /  pO2: 69    / HCO3: 23    / Base Excess: -2.7  /  SaO2: 94.6            CAPILLARY BLOOD GLUCOSE      POCT Blood Glucose.: 195 mg/dL (24 May 2023 22:07)    Cantrell:	  [ x] None	[ ] Daily Cantrell Order Placed	   Indication:	  [ ] Strict I and O's    [ ] Obstruction     [ ] Incontinence + Stage 3 or 4 Decubitus  Central Line:  [ ] None	   [ x]  Medication / TPN Administration     [ ] No Peripheral IV          53yMale w/ PMHx of HTN, type A aortic dissection s/p Dacron grafts, AV resuspension in 2013, CAD s/p CABG x 1 SVG to RCA (5/2013 with Dr. Medina), seizure disorder, recent admission 3/20-4/14 for replacement of transverse aortic arch, second stage TEVAR and aorto-axillary bypass, AV replacement (bio 23mm), and CABG x 1 (SVG-RCA) EF 75% (Brinster, 3/20). Post op c/b severe cardiogenic and vasogenic shock, TREY requiring CVVHD and temporary dialysis, eventually came off RRT and discharged home mid April. pt then presented to [facility] ED 4/27 for syncope vs seizure episode at home. negative neuro work up for Seizures AED dose adjusted however imaging at that time revealed recent graft endoleak, so pt admitted to [facility] on 5/5 for surgical mgmt.   pt taken to OR for Second stage TEVAR on 5/5, post op course c/b Klebsiella bacteremia (5/15), resp failure requiring intubation on 5/18, Bronch 5/19 with cx growing kleb, enterobacter (zosyn, erta resistant), E faecalis, strep angionosus, hemorrhagic pancreatitis of unclear etiology with venu-pancreatic abscess s/p IR aspiration of peripancreatic fluid collection (15cc sanguinous pansensitive kleb 5/22) and paracentesis (4L clear yellow fluid, ascites cx negative) on 5/22, IR venu-pancreatic abscess drainage and placement of drainage catheter on 5/24. pt now transferred from CTICU to SICU for further mgmt of hemorrhagic pancreatitis.     PROCEDURES/SURGERIES:  5/5: second stage TEVAR  5/13: IR Celiac, SMA, splenic, and left gastric artery angiogram reveals no pseudoaneurysm or any active bleeding.  5/22:  IR Aspiration of left peripancreatic fluid collection  (15cc sanguinous) and paracentesis (4L clear yellow fluid)  5/24: IR L peripancreatic abscess drainage and placement of 12F drainage catheter      pancreatic fluid cx 5/22 pan-sensitive klebsiella  peritoneal ascites cx negative 5/22  bronch cx kleb, enterobacter (zosyn, erta resistant), E faecalis, strep angionosus 5/19 5/17 klebsiella bacteremia (pan-sensitive), subsequent bld cx NGTD  5/17 sputum cx kleb and proteus      PMH: as above.     MEDS: current as meds while on CTICU service: dobutamine 7.5, versed gtts, fentanyl gtts, vasopressin 0.06, TPN, nimbex. protonix, caspo, meropenem, ampicillin  NKDA      ICU Vital Signs Last 24 Hrs  T(F): 99 (05-25-23 @ 05:17), Max: 99 (05-25-23 @ 05:17)  HR: 118 (05-25-23 @ 12:00) (87 - 130)  BP: --  BP(mean): --  ABP: 108/65 (05-25-23 @ 12:00)  RR: 22 (05-25-23 @ 12:00) (22 - 31)  SpO2: 100% (05-25-23 @ 12:00) (92% - 100%)    PHYSICAL EXAM:   Neurological: sedated and paralyzed on versed/fent/nimbex  ENT: mucus membrane moist  Cardiovascular: RRR  Respiratory: CTA  Gastrointestinal: soft, non distended, left side drain with bloody output.   Extremities: warm, anasarca  Vascular: no cyanosis/erythema  Skin: no rashes  MSK: no joint swelling.     LABS:    05-25    136  |  104  |  42<H>  ----------------------------<  185<H>  4.3   |  23  |  0.90    Ca    8.3<L>      25 May 2023 09:26  Phos  3.3     05-25  Mg     2.4     05-25    TPro  5.5<L>  /  Alb  2.8<L>  /  TBili  8.5<H>  /  DBili  x   /  AST  43<H>  /  ALT  20  /  AlkPhos  96  05-25  LIVER FUNCTIONS - ( 25 May 2023 09:26 )  Alb: 2.8 g/dL / Pro: 5.5 g/dL / ALK PHOS: 96 U/L / ALT: 20 U/L / AST: 43 U/L / GGT: x                               8.0    48.66 )-----------( 97       ( 25 May 2023 09:26 )             22.2   PT/INR - ( 25 May 2023 09:26 )   PT: 15.0 sec;   INR: 1.26          PTT - ( 25 May 2023 09:26 )  PTT:30.3 secCARDIAC MARKERS ( 25 May 2023 09:26 )  x     / x     / 69 U/L / x     / x      CARDIAC MARKERS ( 25 May 2023 02:11 )  x     / x     / See Note / x     / x      CARDIAC MARKERS ( 24 May 2023 22:19 )  x     / x     / 62 U/L / x     / x      CARDIAC MARKERS ( 24 May 2023 16:39 )  x     / x     / 78 U/L / x     / x      CARDIAC MARKERS ( 24 May 2023 10:41 )  x     / x     / 95 U/L / x     / x        ABG - ( 25 May 2023 09:42 )  pH, Arterial: 7.36  pH, Blood: x     /  pCO2: 40    /  pO2: 69    / HCO3: 23    / Base Excess: -2.7  /  SaO2: 94.6            CAPILLARY BLOOD GLUCOSE      POCT Blood Glucose.: 195 mg/dL (24 May 2023 22:07)    Cantrell:	  [ x] None	[ ] Daily Cantrell Order Placed	   Indication:	  [ ] Strict I and O's    [ ] Obstruction     [ ] Incontinence + Stage 3 or 4 Decubitus  Central Line:  [ ] None	   [ x]  Medication / TPN Administration     [ ] No Peripheral IV          53yMale w/ PMHx of HTN, type A aortic dissection s/p Dacron grafts, AV resuspension in 2013, CAD s/p CABG x 1 SVG to RCA (5/2013 with Dr. Medina), seizure disorder, recent admission 3/20-4/14 for replacement of transverse aortic arch, second stage TEVAR and aorto-axillary bypass, AV replacement (bio 23mm), and CABG x 1 (SVG-RCA) EF 75% (Brinster, 3/20). Post op c/b severe cardiogenic and vasogenic shock, TREY requiring CVVHD and temporary dialysis, eventually came off RRT and discharged home mid April. pt then presented to [facility] ED 4/27 for syncope vs seizure episode at home. negative neuro work up for Seizures AED dose adjusted however imaging at that time revealed recent graft endoleak, so pt admitted to [facility] on 5/5 for surgical mgmt.   pt taken to OR for Second stage TEVAR on 5/5, post op course c/b Klebsiella bacteremia (5/15), resp failure requiring intubation on 5/18, Bronch 5/19 with cx growing kleb, enterobacter (zosyn, erta resistant), E faecalis, strep angionosus, hemorrhagic pancreatitis of unclear etiology (per CT A/P from 5/8) with venu-pancreatic abscess s/p IR aspiration of peripancreatic fluid collection (15cc sanguinous pansensitive kleb 5/22) and paracentesis (4L clear yellow fluid, ascites cx negative) on 5/22, IR venu-pancreatic abscess drainage and placement of drainage catheter on 5/24. pt now transferred from CTICU to SICU for further mgmt of hemorrhagic pancreatitis.     PROCEDURES/SURGERIES:  5/5: second stage TEVAR  5/13: IR Celiac, SMA, splenic, and left gastric artery angiogram reveals no pseudoaneurysm or any active bleeding.  5/22:  IR Aspiration of left peripancreatic fluid collection  (15cc sanguinous) and paracentesis (4L clear yellow fluid)  5/24: IR L peripancreatic abscess drainage and placement of 12F drainage catheter      pancreatic fluid cx 5/22 pan-sensitive klebsiella  peritoneal ascites cx negative 5/22  bronch cx kleb, enterobacter (zosyn, erta resistant), E faecalis, strep angionosus 5/19 5/17 klebsiella bacteremia (pan-sensitive), subsequent bld cx NGTD  5/17 sputum cx kleb and proteus      PMH: as above.     MEDS: current as meds while on CTICU service: dobutamine 7.5, versed gtts, fentanyl gtts, vasopressin 0.06, TPN, nimbex. protonix, caspo, meropenem, ampicillin  NKDA      ICU Vital Signs Last 24 Hrs  T(F): 99 (05-25-23 @ 05:17), Max: 99 (05-25-23 @ 05:17)  HR: 118 (05-25-23 @ 12:00) (87 - 130)  BP: --  BP(mean): --  ABP: 108/65 (05-25-23 @ 12:00)  RR: 22 (05-25-23 @ 12:00) (22 - 31)  SpO2: 100% (05-25-23 @ 12:00) (92% - 100%)    PHYSICAL EXAM:   Neurological: sedated and paralyzed on versed/fent/nimbex  ENT: mucus membrane moist  Cardiovascular: RRR  Respiratory: CTA  Gastrointestinal: soft, non distended, left side drain with bloody output.   Extremities: warm, anasarca  Vascular: no cyanosis/erythema  Skin: no rashes  MSK: no joint swelling.     LABS:    05-25    136  |  104  |  42<H>  ----------------------------<  185<H>  4.3   |  23  |  0.90    Ca    8.3<L>      25 May 2023 09:26  Phos  3.3     05-25  Mg     2.4     05-25    TPro  5.5<L>  /  Alb  2.8<L>  /  TBili  8.5<H>  /  DBili  x   /  AST  43<H>  /  ALT  20  /  AlkPhos  96  05-25  LIVER FUNCTIONS - ( 25 May 2023 09:26 )  Alb: 2.8 g/dL / Pro: 5.5 g/dL / ALK PHOS: 96 U/L / ALT: 20 U/L / AST: 43 U/L / GGT: x                               8.0    48.66 )-----------( 97       ( 25 May 2023 09:26 )             22.2   PT/INR - ( 25 May 2023 09:26 )   PT: 15.0 sec;   INR: 1.26          PTT - ( 25 May 2023 09:26 )  PTT:30.3 secCARDIAC MARKERS ( 25 May 2023 09:26 )  x     / x     / 69 U/L / x     / x      CARDIAC MARKERS ( 25 May 2023 02:11 )  x     / x     / See Note / x     / x      CARDIAC MARKERS ( 24 May 2023 22:19 )  x     / x     / 62 U/L / x     / x      CARDIAC MARKERS ( 24 May 2023 16:39 )  x     / x     / 78 U/L / x     / x      CARDIAC MARKERS ( 24 May 2023 10:41 )  x     / x     / 95 U/L / x     / x        ABG - ( 25 May 2023 09:42 )  pH, Arterial: 7.36  pH, Blood: x     /  pCO2: 40    /  pO2: 69    / HCO3: 23    / Base Excess: -2.7  /  SaO2: 94.6            CAPILLARY BLOOD GLUCOSE      POCT Blood Glucose.: 195 mg/dL (24 May 2023 22:07)    Cantrell:	  [ x] None	[ ] Daily Cantrell Order Placed	   Indication:	  [ ] Strict I and O's    [ ] Obstruction     [ ] Incontinence + Stage 3 or 4 Decubitus  Central Line:  [ ] None	   [ x]  Medication / TPN Administration     [ ] No Peripheral IV          53yMale w/ PMHx of HTN, type A aortic dissection s/p Dacron grafts, AV resuspension in 2013, CAD s/p CABG x 1 SVG to RCA (5/2013 with Dr. Medina), seizure disorder, recent admission 3/20-4/14 for replacement of transverse aortic arch, second stage TEVAR and aorto-axillary bypass, AV replacement (bio 23mm), and CABG x 1 (SVG-RCA) EF 75% (Brinster, 3/20). Post op c/b severe cardiogenic and vasogenic shock, TREY requiring CVVHD and temporary dialysis, eventually came off RRT and discharged home mid April. pt then presented to [facility] ED 4/27 for syncope vs seizure episode at home. negative neuro work up for Seizures AED dose adjusted however imaging at that time revealed recent graft endoleak, so pt admitted to [facility] on 5/5 for surgical mgmt.   pt taken to OR for Second stage TEVAR on 5/5, post op course c/b Klebsiella bacteremia (5/15), resp failure requiring intubation on 5/18, Bronch 5/19 with cx growing kleb, enterobacter (zosyn, erta resistant), E faecalis, strep angionosus, Surg intially consulted for acute pancreatitis on 5/8, then reconsulted on 5/13 with concerns for active hemorrhage of pancreatic tail and peripancreatic abscess seen on repeat CT, s/p negative mesenteric angio on 5/13, s/p IR aspiration of peripancreatic fluid collection (15cc sanguinous pansensitive kleb 5/22) and paracentesis (4L clear yellow fluid, ascites cx negative) on 5/22, IR venu-pancreatic abscess drainage and placement of drainage catheter on 5/24. pt now transferred from CTICU to SICU for further mgmt of hemorrhagic pancreatitis.       PROCEDURES/SURGERIES:  5/5: second stage TEVAR  5/13: IR Celiac, SMA, splenic, and left gastric artery angiogram reveals no pseudoaneurysm or any active bleeding.  5/22:  IR Aspiration of left peripancreatic fluid collection  (15cc sanguinous) and paracentesis (4L clear yellow fluid)  5/24: IR L peripancreatic abscess drainage and placement of 12F drainage catheter    cultures:   pancreatic fluid cx 5/22 pan-sensitive klebsiella  peritoneal ascites cx negative 5/22  bronch cx kleb, enterobacter (zosyn, erta resistant), E faecalis, strep angionosus 5/19 5/17 klebsiella bacteremia (pan-sensitive), subsequent bld cx NGTD  5/17 sputum cx kleb and proteus      PMH: as above.     MEDS: current as meds while on CTICU service: dobutamine 7.5, versed gtts, fentanyl gtts, vasopressin 0.06, TPN, nimbex. protonix, caspo, meropenem, ampicillin  NKDA      ICU Vital Signs Last 24 Hrs  T(F): 99 (05-25-23 @ 05:17), Max: 99 (05-25-23 @ 05:17)  HR: 118 (05-25-23 @ 12:00) (87 - 130)  BP: --  BP(mean): --  ABP: 108/65 (05-25-23 @ 12:00)  RR: 22 (05-25-23 @ 12:00) (22 - 31)  SpO2: 100% (05-25-23 @ 12:00) (92% - 100%)    PHYSICAL EXAM:   Neurological: sedated and paralyzed on versed/fent/nimbex  ENT: mucus membrane moist  Cardiovascular: RRR  Respiratory: CTA  Gastrointestinal: soft, non distended, left side drain with bloody output.   Extremities: warm, anasarca  Vascular: no cyanosis/erythema  Skin: no rashes  MSK: no joint swelling.     LABS:    05-25    136  |  104  |  42<H>  ----------------------------<  185<H>  4.3   |  23  |  0.90    Ca    8.3<L>      25 May 2023 09:26  Phos  3.3     05-25  Mg     2.4     05-25    TPro  5.5<L>  /  Alb  2.8<L>  /  TBili  8.5<H>  /  DBili  x   /  AST  43<H>  /  ALT  20  /  AlkPhos  96  05-25  LIVER FUNCTIONS - ( 25 May 2023 09:26 )  Alb: 2.8 g/dL / Pro: 5.5 g/dL / ALK PHOS: 96 U/L / ALT: 20 U/L / AST: 43 U/L / GGT: x                               8.0    48.66 )-----------( 97       ( 25 May 2023 09:26 )             22.2   PT/INR - ( 25 May 2023 09:26 )   PT: 15.0 sec;   INR: 1.26          PTT - ( 25 May 2023 09:26 )  PTT:30.3 secCARDIAC MARKERS ( 25 May 2023 09:26 )  x     / x     / 69 U/L / x     / x      CARDIAC MARKERS ( 25 May 2023 02:11 )  x     / x     / See Note / x     / x      CARDIAC MARKERS ( 24 May 2023 22:19 )  x     / x     / 62 U/L / x     / x      CARDIAC MARKERS ( 24 May 2023 16:39 )  x     / x     / 78 U/L / x     / x      CARDIAC MARKERS ( 24 May 2023 10:41 )  x     / x     / 95 U/L / x     / x        ABG - ( 25 May 2023 09:42 )  pH, Arterial: 7.36  pH, Blood: x     /  pCO2: 40    /  pO2: 69    / HCO3: 23    / Base Excess: -2.7  /  SaO2: 94.6            CAPILLARY BLOOD GLUCOSE      POCT Blood Glucose.: 195 mg/dL (24 May 2023 22:07)    [place]:	  [ x] None	[ ] Daily Cantrell Order Placed	   Indication:	  [ ] Strict I and O's    [ ] Obstruction     [ ] Incontinence + Stage 3 or 4 Decubitus  Central Line:  [ ] None	   [ x]  Medication / TPN Administration     [ ] No Peripheral IV

## 2023-05-25 NOTE — PROGRESS NOTE ADULT - ASSESSMENT
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        INCOMPLETE    #Klebsiella Bacteremia  Klebsiella BSI i/s/o VAP and pancreatic necrosis/RP hemorrhage (adjacent to aortic vascular graft). f/u surveillance bcx 5/18, blood cultures X 2 from 5/19, sputum culture 5/19. Sputum culture from 5/19 with Klebsiella pneumoniae, Enterobacter cloacae (S shin, I erta), E. faecalis and Strep anginosus.     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          #Klebsiella Bacteremia  Klebsiella BSI i/s/o VAP and pancreatic necrosis/RP hemorrhage (adjacent to aortic vascular graft). f/u surveillance bcx 5/18, blood cultures X 2 from 5/19, sputum culture 5/19. Sputum culture from 5/19 with Klebsiella pneumoniae, Enterobacter cloacae (S shin, I erta), E. faecalis and Strep anginosus.     - c/w trial of caspofungin 50mg IV daily, TPN is risk factor for candidemia  - f/u culture data  - c/w meropenem 1 g IV q8h and ampicillin 2 g IV q6h (dosed for CVVHD) plus empiric caspofungin 50mg IV q24h  - advise against any fluoroquinolones due to increased risk of aortic aneurysmal rupture  - f/u IR recommendations  - f/u GI recommendations         Team 1 will continue to follow

## 2023-05-25 NOTE — CONSULT NOTE ADULT - ASSESSMENT
53yMale w/ PMHx of HTN, type A aortic dissection s/p Dacron grafts, AV resuspension in 2013, CAD s/p CABG x 1 SVG to RCA (5/2013 with Dr. Medina), seizure disorder, recent admission 3/20-4/14 for replacement of transverse aortic arch, second stage TEVAR and aorto-axillary bypass, AV replacement (bio 23mm), and CABG x 1 (SVG-RCA) EF 75% ([place], 3/20). Post op c/b severe cardiogenic and vasogenic shock, TREY requiring CVVHD and temporary dialysis, eventually came off RRT and discharged home mid April. pt then presented to [facility] ED 4/27 for syncope vs seizure episode at home. negative neuro work up for Seizures AED dose adjusted however imaging at that time revealed recent graft endoleak, so pt admitted to [facility] on 5/5 for surgical mgmt.   pt taken to OR for Second stage TEVAR on 5/5, post op course c/b Klebsiella bacteremia (5/15), resp failure requiring intubation on 5/18, Bronch 5/19 with cx growing kleb, enterobacter (zosyn, erta resistant), E faecalis, strep angionosus, hemorrhagic pancreatitis of unclear etiology with venu-pancreatic abscess s/p IR aspiration of peripancreatic fluid collection (15cc sanguinous pansensitive kleb 5/22) and paracentesis (4L clear yellow fluid, ascites cx negative) on 5/22, IR venu-pancreatic abscess drainage and placement of drainage catheter on 5/24. pt now transferred from CTICU to SICU for further mgmt of hemorrhagic pancreatitis.       NEURO: versed gtts and fentanyl gtts RASS -5 while paralyzed on nimbex gtts.   CV: s/p PEA arrest on 5/24 night, currently on dobutamine 7.5 and vasopressin 0.06, off levophed and epinephrine gtts this morning. Echo from 5/19 hyperdynamic LV, SHAJI with LVOTO (gradient 26), normal RV, mild MR, no pulm htn,   PULM: intubated on AC 70/500/12/8, ?ARDS? on nitric oxide, s/p multiple bronch (last done today), bronch cx from 5/19 kleb, enterobacter (zosyn, erta resistant), E faecalis, strep angionosus.   GI/FEN: NPO on TPN, protonix ppx, paracentesis cx neg on 5/22, venu-pancreatic abscess s/p aspiration 5/22, then drain placement 5/24, cx grew klebsiella, IR to eval for further abscess drainage.   : TREY on CVVHD.   ENDO: ISS  ID: ID following. pan-sensitive kleb bacteremia from 5/15 & 5/17, subsequent bld cx negative. PNA w/ bronch cx kleb, enterobacter (zosyn, erta resistant), E faecalis, strep angionosus from 5/19, pancreatic abscess cx pan-sensitive kleb 5/22. on meropenem, ampicillin, trial of caspo for coverage of potential candidemia with TPN usage.   PPX: SCDs, SQH.   LINES: PIVs, RIJ HD cath (5/  WOUNDS/DRAINS:        53yMale w/ PMHx of HTN, type A aortic dissection s/p Dacron grafts, AV resuspension in 2013, CAD s/p CABG x 1 SVG to RCA (5/2013 with Dr. Medina), seizure disorder, recent admission 3/20-4/14 for replacement of transverse aortic arch, second stage TEVAR and aorto-axillary bypass, AV replacement (bio 23mm), and CABG x 1 (SVG-RCA) EF 75% ([place], 3/20). Post op c/b severe cardiogenic and vasogenic shock, TREY requiring CVVHD and temporary dialysis, eventually came off RRT and discharged home mid April. pt then presented to [facility] ED 4/27 for syncope vs seizure episode at home. negative neuro work up for Seizures AED dose adjusted however imaging at that time revealed recent graft endoleak, so pt admitted to [facility] on 5/5 for surgical mgmt.   pt taken to OR for Second stage TEVAR on 5/5, post op course c/b Klebsiella bacteremia (5/15), resp failure requiring intubation on 5/18, Bronch 5/19 with cx growing kleb, enterobacter (zosyn, erta resistant), E faecalis, strep angionosus, hemorrhagic pancreatitis of unclear etiology with venu-pancreatic abscess s/p IR aspiration of peripancreatic fluid collection (15cc sanguinous pansensitive kleb 5/22) and paracentesis (4L clear yellow fluid, ascites cx negative) on 5/22, IR venu-pancreatic abscess drainage and placement of drainage catheter on 5/24. pt now transferred from CTICU to SICU for further mgmt of hemorrhagic pancreatitis.       NEURO: versed gtts and fentanyl gtts RASS -5 while paralyzed on nimbex gtts.   CV: s/p PEA arrest on 5/24 night, currently on dobutamine 7.5 and vasopressin 0.06, off levophed and epinephrine gtts this morning. Echo from 5/19 hyperdynamic LV, SHAJI with LVOTO (gradient 26), normal RV, mild MR, no pulm htn,   PULM: intubated on AC 70/500/12/8, ?ARDS? on nitric oxide, s/p multiple bronch (last done today), bronch cx from 5/19 kleb, enterobacter (zosyn, erta resistant), E faecalis, strep angionosus.   GI/FEN: NPO on TPN, protonix ppx, paracentesis cx neg on 5/22, venu-pancreatic abscess s/p aspiration 5/22, then drain placement 5/24, cx grew klebsiella, IR to eval for further abscess drainage.   : TREY on CVVHD since 5/22 for clearance.   ENDO: ISS  ID: ID following. pan-sensitive kleb bacteremia from 5/15 & 5/17, subsequent bld cx negative. PNA w/ bronch cx kleb, enterobacter (zosyn, erta resistant), E faecalis, strep angionosus from 5/19, pancreatic abscess cx pan-sensitive kleb 5/22. on meropenem, ampicillin, trial of caspo for coverage of potential candidemia with TPN usage.   PPX: SCDs, SQH.   LINES: R Ax kalpana (5/12--), RIJ TLC (5/22--), RIJ HD cath (5/22--), [facility] TLC (5/23--)   WOUNDS/DRAINS: left abd venu-pancreatic abscess CODIE drain.        53yMale w/ PMHx of HTN, type A aortic dissection s/p Dacron grafts, AV resuspension in 2013, CAD s/p CABG x 1 SVG to RCA (5/2013 with Dr. Medina), seizure disorder, recent admission 3/20-4/14 for replacement of transverse aortic arch, second stage TEVAR and aorto-axillary bypass, AV replacement (bio 23mm), and CABG x 1 (SVG-RCA) EF 75% ([place], 3/20). Post op c/b severe cardiogenic and vasogenic shock, TREY requiring CVVHD and temporary dialysis, eventually came off RRT and discharged home mid April. pt then presented to [facility] ED 4/27 for syncope vs seizure episode at home. negative neuro work up for Seizures AED dose adjusted however imaging at that time revealed recent graft endoleak, so pt admitted to [facility] on 5/5 for surgical mgmt.   pt taken to OR for Second stage TEVAR on 5/5, post op course c/b Klebsiella bacteremia (5/15), resp failure requiring intubation on 5/18, Bronch 5/19 with cx growing kleb, enterobacter (zosyn, erta resistant), E faecalis, strep angionosus, hemorrhagic pancreatitis of unclear etiology with venu-pancreatic abscess s/p IR aspiration of peripancreatic fluid collection (15cc sanguinous pansensitive kleb 5/22) and paracentesis (4L clear yellow fluid, ascites cx negative) on 5/22, IR venu-pancreatic abscess drainage and placement of drainage catheter on 5/24. pt now transferred from CTICU to SICU for further mgmt of hemorrhagic pancreatitis.       NEURO: versed gtts and fentanyl gtts RASS -5 while paralyzed on nimbex gtts.   CV: s/p PEA arrest on 5/24 night, currently on dobutamine 7.5 and vasopressin 0.06, off levophed and epinephrine gtts this morning. Echo from 5/19 hyperdynamic LV, SHAJI with LVOTO (gradient 26), normal RV, mild MR, no pulm htn,   PULM: intubated on AC 70/500/12/8, ?ARDS? on nitric oxide and nimbex for vent synchrony, s/p multiple bronch (last done today), bronch cx from 5/19 kleb, enterobacter (zosyn, erta resistant), E faecalis, strep angionosus.   GI/FEN: NPO on TPN, protonix ppx, paracentesis cx neg on 5/22, venu-pancreatic abscess s/p aspiration 5/22, then drain placement 5/24, cx grew klebsiella, IR to eval for further abscess drainage.   : TREY on CVVHD since 5/22 for clearance.   ENDO: ISS  ID: ID following. pan-sensitive kleb bacteremia from 5/15 & 5/17, subsequent bld cx negative. PNA w/ bronch cx kleb, enterobacter (zosyn, erta resistant), E faecalis, strep angionosus from 5/19, pancreatic abscess cx pan-sensitive kleb 5/22. on meropenem, ampicillin, trial of caspo for coverage of potential candidemia with TPN usage.   PPX: SCDs, SQH.   LINES: R Ax kalpana (5/12--), RIJ TLC (5/22--), RIJ HD cath (5/22--), [facility] TLC (5/23--)   WOUNDS/DRAINS: left abd venu-pancreatic abscess CODIE drain.        53yMale w/ PMHx of HTN, type A aortic dissection s/p Dacron grafts, AV resuspension in 2013, CAD s/p CABG x 1 SVG to RCA (5/2013 with Dr. Medina), seizure disorder, recent admission 3/20-4/14 for replacement of transverse aortic arch, second stage TEVAR and aorto-axillary bypass, AV replacement (bio 23mm), and CABG x 1 (SVG-RCA) EF 75% ([place], 3/20). Post op c/b severe cardiogenic and vasogenic shock, TREY requiring CVVHD and temporary dialysis, eventually came off RRT and discharged home mid April. pt then presented to [facility] ED 4/27 for syncope vs seizure episode at home. negative neuro work up for Seizures AED dose adjusted however imaging at that time revealed recent graft endoleak, so pt admitted to [facility] on 5/5 for surgical mgmt.   pt taken to OR for Second stage TEVAR on 5/5, post op course c/b Klebsiella bacteremia (5/15), resp failure requiring intubation on 5/18, Bronch 5/19 with cx growing kleb, enterobacter (zosyn, erta resistant), E faecalis, strep angionosus, hemorrhagic pancreatitis of unclear etiology with venu-pancreatic abscess (per CT A/P from 5/8) s/p IR aspiration of peripancreatic fluid collection (15cc sanguinous pansensitive kleb 5/22) and paracentesis (4L clear yellow fluid, ascites cx negative) on 5/22, IR venu-pancreatic abscess drainage and placement of drainage catheter on 5/24. pt now transferred from CTICU to SICU for further mgmt of hemorrhagic pancreatitis.       NEURO: versed gtts and fentanyl gtts RASS -5 while paralyzed on nimbex gtts.   CV: s/p PEA arrest on 5/24 night, currently on dobutamine 7.5 and vasopressin 0.06, off levophed and epinephrine gtts this morning. Echo from 5/19 hyperdynamic LV, SAHJI with LVOTO (gradient 26), normal RV, mild MR, no pulm htn,   PULM: intubated on AC 70/500/12/8, ?ARDS? on nitric oxide and nimbex for vent synchrony, s/p multiple bronch (last done today), bronch cx from 5/19 kleb, enterobacter (zosyn, erta resistant), E faecalis, strep angionosus.   GI/FEN: NPO on TPN, protonix ppx, paracentesis cx neg on 5/22, venu-pancreatic abscess s/p aspiration 5/22, then drain placement 5/24, cx grew klebsiella, IR to eval for further abscess drainage.   : TREY on CVVHD since 5/22 for clearance.   ENDO: ISS  ID: ID following. pan-sensitive kleb bacteremia from 5/15 & 5/17, subsequent bld cx negative. PNA w/ bronch cx kleb, enterobacter (zosyn, erta resistant), E faecalis, strep angionosus from 5/19, pancreatic abscess cx pan-sensitive kleb 5/22. on meropenem, ampicillin, trial of caspo for coverage of potential candidemia with TPN usage.   PPX: SCDs, SQH.   LINES: R Ax kalpana (5/12--), RIJ TLC (5/22--), RIJ HD cath (5/22--), [facility] TLC (5/23--)   WOUNDS/DRAINS: left abd venu-pancreatic abscess CODIE drain.        53yMale w/ PMHx of HTN, type A aortic dissection s/p Dacron grafts, AV resuspension in 2013, CAD s/p CABG x 1 SVG to RCA (5/2013 with Dr. Medina), seizure disorder, recent admission 3/20-4/14 for replacement of transverse aortic arch, second stage TEVAR and aorto-axillary bypass, AV replacement (bio 23mm), and CABG x 1 (SVG-RCA) EF 75% ([place], 3/20). Post op c/b severe cardiogenic and vasogenic shock, TREY requiring CVVHD and temporary dialysis, eventually came off RRT and discharged home mid April. pt then presented to [facility] ED 4/27 for syncope vs seizure episode at home. negative neuro work up for Seizures AED dose adjusted however imaging at that time revealed recent graft endoleak, so pt admitted to [facility] on 5/5 for surgical mgmt.   pt taken to OR for Second stage TEVAR on 5/5, post op course c/b Klebsiella bacteremia (5/15), resp failure requiring intubation on 5/18, Bronch 5/19 with cx growing kleb, enterobacter (zosyn, erta resistant), E faecalis, strep angionosus, hemorrhagic pancreatitis of unclear etiology with venu-pancreatic abscess (per CT A/P from 5/8) s/p IR aspiration of peripancreatic fluid collection (15cc sanguinous pansensitive kleb 5/22) and paracentesis (4L clear yellow fluid, ascites cx negative) on 5/22, IR venu-pancreatic abscess drainage and placement of drainage catheter on 5/24. pt now transferred from CTICU to SICU for further mgmt of hemorrhagic pancreatitis.       NEURO: versed gtts and fentanyl gtts RASS -5 while paralyzed on nimbex gtts. cont vimpat w/hx seizures  CV: s/p PEA arrest on 5/24 night, currently on dobutamine 7.5mcg and vasopressin 0.06u, off levophed and epinephrine gtts this morning. Echo from 5/19 hyperdynamic LV, SHAJI with LVOTO (gradient 26), normal RV, mild MR, no pulm htn,   PULM: intubated 5/18 on AC 70/500/22/8, ARDS on nitric oxide and nimbex for vent synchrony, s/p multiple bronch (last done today), bronch cx from 5/19 kleb, enterobacter (zosyn, erta resistant), E faecalis, strep angionosus.   GI/FEN: NPO on TPN, protonix ppx, Surg intially consulted for acute pancreatitis on 5/8, then reconsulted on 5/13 with concerns for active hemorrhage of pancreatic tail suggested on repeat CT, s/p negative mesenteric angio on 5/13, s/p paracentesis cx neg on 5/22, venu-pancreatic abscess s/p aspiration 5/22, then drain placement 5/24, cx grew klebsiella, IR planned for additional paracentesis and possible further abscess drainage.   : TREY on CVVHD since 5/22 for clearance.   ENDO: ISS  ID: ID following. pan-sensitive kleb bacteremia from 5/15 & 5/17, subsequent bld cx negative. PNA w/ bronch cx kleb, enterobacter (zosyn, erta resistant), E faecalis, strep angionosus from 5/19, pancreatic abscess cx pan-sensitive kleb 5/22. on meropenem, ampicillin, trial of caspo for coverage of potential candidemia with TPN usage.   PPX: SCDs, SQH.   LINES: R Ax kalpana (5/12--), RIJ TLC (5/22--), RIJ HD cath (5/22--), [facility] TLC (5/23--)   WOUNDS/DRAINS: left abd venu-pancreatic abscess CODIE drain.        53yMale w/ PMHx of HTN, type A aortic dissection s/p Dacron grafts, AV resuspension in 2013, CAD s/p CABG x 1 SVG to RCA (5/2013 with Dr. Medina), seizure disorder, recent admission 3/20-4/14 for replacement of transverse aortic arch, second stage TEVAR and aorto-axillary bypass, AV replacement (bio 23mm), and CABG x 1 (SVG-RCA) EF 75% ([place], 3/20). Post op c/b severe cardiogenic and vasogenic shock, TREY requiring CVVHD and temporary dialysis, eventually came off RRT and discharged home mid April. pt then presented to [facility] ED 4/27 for syncope vs seizure episode at home. negative neuro work up for Seizures AED dose adjusted however imaging at that time revealed recent graft endoleak, so pt admitted to [facility] on 5/5 for surgical mgmt.   pt taken to OR for Second stage TEVAR on 5/5, post op course c/b Klebsiella bacteremia (5/15), resp failure requiring intubation on 5/18, Bronch 5/19 with cx growing kleb, enterobacter (zosyn, erta resistant), E faecalis, strep angionosus, hemorrhagic pancreatitis of unclear etiology with venu-pancreatic abscess (per CT A/P from 5/8) s/p IR aspiration of peripancreatic fluid collection (15cc sanguinous pansensitive kleb 5/22) and paracentesis (4L clear yellow fluid, ascites cx negative) on 5/22, IR venu-pancreatic abscess drainage and placement of drainage catheter on 5/24. pt now transferred from CTICU to SICU for further mgmt of hemorrhagic pancreatitis.       NEURO: versed gtts and fentanyl gtts RASS -5 while paralyzed on nimbex gtts. episode of seizure on 5/12 s/p epilepsy consult, on vimpat, and depakote, but depakote was weaned off due to concerns for drug-related hemorrhagic pancreatitis.   CV: s/p PEA arrest on 5/24 night, currently on dobutamine 7.5mcg and vasopressin 0.06u, off levophed and epinephrine gtts this morning. Echo from 5/19 hyperdynamic LV, SHAJI with LVOTO (gradient 26), normal RV, mild MR, no pulm htn,   PULM: intubated 5/18 on AC 70/500/22/8, ARDS on nitric oxide and nimbex for vent synchrony, s/p multiple bronch (last done today), bronch cx from 5/19 kleb, enterobacter (zosyn, erta resistant), E faecalis, strep angionosus.   GI/FEN: NPO on TPN, protonix ppx, Surg intially consulted for acute pancreatitis on 5/8, then reconsulted on 5/13 with concerns for active hemorrhage of pancreatic tail suggested on repeat CT, s/p negative mesenteric angio on 5/13, s/p paracentesis cx neg on 5/22, venu-pancreatic abscess s/p aspiration 5/22, then drain placement 5/24, cx grew klebsiella, IR planned for additional paracentesis and possible further abscess drainage.   : TREY on CVVHD since 5/22 for clearance.   ENDO: ISS  ID: ID following. pan-sensitive kleb bacteremia from 5/15 & 5/17, subsequent bld cx negative. PNA w/ bronch cx kleb, enterobacter (zosyn, erta resistant), E faecalis, strep angionosus from 5/19, pancreatic abscess cx pan-sensitive kleb 5/22. on meropenem, ampicillin, trial of caspo for coverage of potential candidemia with TPN usage.   PPX: SCDs, SQH.   LINES: R Ax kalpana (5/12--), RIJ TLC (5/22--), RIJ HD cath (5/22--), [facility] TLC (5/23--)   WOUNDS/DRAINS: left abd venu-pancreatic abscess CODIE drain.        53yMale w/ PMHx of HTN, type A aortic dissection s/p Dacron grafts, AV resuspension in 2013, CAD s/p CABG x 1 SVG to RCA (5/2013 with Dr. Medina), seizure disorder, recent admission 3/20-4/14 for replacement of transverse aortic arch, second stage TEVAR and aorto-axillary bypass, AV replacement (bio 23mm), and CABG x 1 (SVG-RCA) EF 75% ([place], 3/20). Post op c/b severe cardiogenic and vasogenic shock, TREY requiring CVVHD and temporary dialysis, eventually came off RRT and discharged home mid April. pt then presented to [facility] ED 4/27 for syncope vs seizure episode at home. negative neuro work up for Seizures AED dose adjusted however imaging at that time revealed recent graft endoleak, so pt admitted to [facility] on 5/5 for surgical mgmt.   pt taken to OR for Second stage TEVAR on 5/5, post op course c/b Klebsiella bacteremia (5/15), resp failure requiring intubation on 5/18, Bronch 5/19 with cx growing kleb, enterobacter (zosyn, erta resistant), E faecalis, strep angionosus, hemorrhagic pancreatitis of unclear etiology with venu-pancreatic abscess (per CT A/P from 5/8 and 5/13) s/p IR aspiration of peripancreatic fluid collection (15cc sanguinous pansensitive kleb 5/22) and paracentesis (4L clear yellow fluid, ascites cx negative) on 5/22, IR venu-pancreatic abscess drainage and placement of drainage catheter on 5/24. pt now transferred from CTICU to SICU for further mgmt of hemorrhagic pancreatitis.       NEURO: versed gtts and fentanyl gtts RASS -5 while paralyzed on nimbex gtts. episode of seizure on 5/12 s/p epilepsy consult, on vimpat, and depakote, but depakote was weaned off due to concerns for drug-related hemorrhagic pancreatitis.   CV: s/p PEA arrest on 5/24 night, currently on dobutamine 7.5mcg and vasopressin 0.06u, off levophed and epinephrine gtts this morning. Echo from 5/19 hyperdynamic LV, SHAJI with LVOTO (gradient 26), normal RV, mild MR, no pulm htn,   PULM: intubated 5/18 on AC 70/500/22/8, ARDS on nitric oxide and nimbex for vent synchrony, s/p multiple bronch (last done today), bronch cx from 5/19 kleb, enterobacter (zosyn, erta resistant), E faecalis, strep angionosus.   GI/FEN: NPO on TPN, protonix ppx, Surg intially consulted for acute pancreatitis on 5/8, then reconsulted on 5/13 with concerns for active hemorrhage of pancreatic tail and peripancreatic abscess seen on repeat CT, s/p negative mesenteric angio on 5/13, s/p paracentesis cx neg on 5/22, venu-pancreatic abscess s/p aspiration 5/22, then drain placement 5/24, cx grew klebsiella, IR planned for additional paracentesis and possible further abscess drainage.   : TREY on CVVHD since 5/22 for clearance.   HEME: acute blood loss anemia ~5/12 requiring 11units of PRBC within 48hr period. CT 5/13 with intraabdominal bleed, with surg consulted as above.   ENDO: ISS  ID: ID following. pan-sensitive kleb bacteremia from 5/15 & 5/17, subsequent bld cx negative. PNA w/ bronch cx kleb, enterobacter (zosyn, erta resistant), E faecalis, strep angionosus from 5/19, pancreatic abscess cx pan-sensitive kleb 5/22. on meropenem, ampicillin, trial of caspo for coverage of potential candidemia with TPN usage.   PPX: SCDs, SQH.   LINES: R Ax kalpana (5/12--), RIJ TLC (5/22--), RIJ HD cath (5/22--), [facility] TLC (5/23--)   WOUNDS/DRAINS: left abd venu-pancreatic abscess CODIE drain.        53yMale w/ PMHx of HTN, type A aortic dissection s/p Dacron grafts, AV resuspension in 2013, CAD s/p CABG x 1 SVG to RCA (5/2013 with Dr. Medina), seizure disorder, recent admission 3/20-4/14 for replacement of transverse aortic arch, second stage TEVAR and aorto-axillary bypass, AV replacement (bio 23mm), and CABG x 1 (SVG-RCA) EF 75% ([place], 3/20). Post op c/b severe cardiogenic and vasogenic shock, TREY requiring CVVHD and temporary dialysis, eventually came off RRT and discharged home mid April. pt then presented to [facility] ED 4/27 for syncope vs seizure episode at home. negative neuro work up for Seizures AED dose adjusted however imaging at that time revealed recent graft endoleak, so pt admitted to [facility] on 5/5 for surgical mgmt.   pt taken to OR for Second stage TEVAR on 5/5, post op course c/b Klebsiella bacteremia (5/15), resp failure requiring intubation on 5/18, Bronch 5/19 with cx growing kleb, enterobacter (zosyn, erta resistant), E faecalis, strep angionosus, hemorrhagic pancreatitis of unclear etiology with venu-pancreatic abscess (per CT A/P from 5/8 and 5/13) s/p IR aspiration of peripancreatic fluid collection (15cc sanguinous pansensitive kleb 5/22) and paracentesis (4L clear yellow fluid, ascites cx negative) on 5/22, IR venu-pancreatic abscess drainage and placement of drainage catheter on 5/24. pt now transferred from CTICU to SICU for further mgmt of hemorrhagic pancreatitis.       NEURO: versed gtts and fentanyl gtts RASS -5 while paralyzed on nimbex gtts. episode of seizure on 5/12 s/p epilepsy consult, on vimpat, and depakote, but depakote was weaned off due to concerns for drug-related hemorrhagic pancreatitis.   CV: s/p PEA arrest on 5/24 night, currently on dobutamine 7.5mcg and vasopressin 0.06u, off levophed and epinephrine gtts this morning. Echo from 5/19 hyperdynamic LV, SHAJI with LVOTO (gradient 26), normal RV, mild MR, no pulm htn,   PULM: intubated 5/18 on AC 70/500/22/8, ARDS on nitric oxide and nimbex for vent synchrony, s/p multiple bronch (last done today), bronch cx from 5/19 kleb, enterobacter (zosyn, erta resistant), E faecalis, strep angionosus.   GI/FEN: NPO on TPN, protonix ppx, Surg intially consulted for acute pancreatitis on 5/8, then reconsulted on 5/13 with concerns for active hemorrhage of pancreatic tail and peripancreatic abscess seen on repeat CT, s/p negative mesenteric angio on 5/13, s/p paracentesis cx neg on 5/22, venu-pancreatic abscess s/p aspiration 5/22, then drain placement 5/24, cx grew klebsiella, IR planned for additional paracentesis and possible further abscess drainage.   : TREY on CVVHD since 5/22 for clearance.   HEME: acute blood loss anemia ~5/12 requiring 11units of PRBC. CT 5/13 with intraabdominal bleed, with surg consulted as above.   ENDO: ISS  ID: ID following. pan-sensitive kleb bacteremia from 5/15 & 5/17, subsequent bld cx negative. PNA w/ bronch cx kleb, enterobacter (zosyn, erta resistant), E faecalis, strep angionosus from 5/19, pancreatic abscess cx pan-sensitive kleb 5/22. on meropenem, ampicillin, trial of caspo for coverage of potential candidemia with TPN usage.   PPX: SCDs, SQH.   LINES: R Ax kalpana (5/12--), RIJ TLC (5/22--), RIJ HD cath (5/22--), [facility] TLC (5/23--)   WOUNDS/DRAINS: left abd venu-pancreatic abscess CODIE drain.

## 2023-05-25 NOTE — PROGRESS NOTE ADULT - SUBJECTIVE AND OBJECTIVE BOX
INFECTIOUS DISEASES CONSULT FOLLOW-UP NOTE    INTERVAL HPI/OVERNIGHT EVENTS:      ROS:   Constitutional, eyes, ENT, cardiovascular, respiratory, gastrointestinal, genitourinary, integumentary, neurological, psychiatric and heme/lymph are otherwise negative other than noted above       ANTIBIOTICS/RELEVANT:    MEDICATIONS  (STANDING):  albumin human 25% IVPB 50 milliLiter(s) IV Intermittent every 30 minutes  ampicillin  IVPB 2 Gram(s) IV Intermittent every 6 hours  aspirin  chewable 81 milliGRAM(s) Oral daily  caspofungin IVPB      caspofungin IVPB 50 milliGRAM(s) IV Intermittent every 24 hours  chlorhexidine 0.12% Liquid 15 milliLiter(s) Oral Mucosa every 12 hours  chlorhexidine 2% Cloths 1 Application(s) Topical daily  cisatracurium Infusion 3 MICROgram(s)/kG/Min (12.6 mL/Hr) IV Continuous <Continuous>  CRRT Treatment    <Continuous>  dexMEDEtomidine Infusion 0.4 MICROgram(s)/kG/Hr (7 mL/Hr) IV Continuous <Continuous>  DOBUTamine Infusion 5 MICROgram(s)/kG/Min (10.5 mL/Hr) IV Continuous <Continuous>  EPINEPHrine    Infusion 0.033 MICROgram(s)/kG/Min (8.66 mL/Hr) IV Continuous <Continuous>  heparin   Injectable 5000 Unit(s) SubCutaneous every 8 hours  lacosamide IVPB 250 milliGRAM(s) IV Intermittent every 12 hours  meropenem  IVPB 1000 milliGRAM(s) IV Intermittent every 8 hours  midazolam Infusion 0.02 mG/kG/Hr (1.4 mL/Hr) IV Continuous <Continuous>  pantoprazole  Injectable 40 milliGRAM(s) IV Push daily  Parenteral Nutrition - Adult 1 Each (70 mL/Hr) TPN Continuous <Continuous>  Phoxillum Filtration BK 4 / 2.5 5000 milliLiter(s) (2000 mL/Hr) CRRT <Continuous>  sodium chloride 0.9%. 1000 milliLiter(s) (10 mL/Hr) IV Continuous <Continuous>    MEDICATIONS  (PRN):  fentaNYL    Injectable 25 MICROGram(s) IV Push every 3 hours PRN Severe Pain (7 - 10)        Vital Signs Last 24 Hrs  T(C): 37.2 (25 May 2023 05:17), Max: 37.2 (25 May 2023 05:17)  T(F): 99 (25 May 2023 05:17), Max: 99 (25 May 2023 05:17)  HR: 128 (25 May 2023 08:00) (87 - 130)  BP: --  BP(mean): --  RR: 22 (25 May 2023 08:00) (22 - 31)  SpO2: 96% (25 May 2023 08:00) (92% - 100%)    Parameters below as of 25 May 2023 08:00  Patient On (Oxygen Delivery Method): ventilator    O2 Concentration (%): 50    05-24-23 @ 07:01  -  05-25-23 @ 07:00  --------------------------------------------------------  IN: 3947.2 mL / OUT: 4271 mL / NET: -323.8 mL    05-25-23 @ 07:01  -  05-25-23 @ 08:18  --------------------------------------------------------  IN: 107 mL / OUT: 10 mL / NET: 97 mL      PHYSICAL EXAM:  Constitutional: alert, NAD  Eyes: the sclera and conjunctiva were normal.   ENT: the ears and nose were normal in appearance.   Neck: the appearance of the neck was normal and the neck was supple.   Pulmonary: no respiratory distress and lungs were clear to auscultation bilaterally.   Heart: heart rate was normal and rhythm regular, normal S1 and S2  Vascular:. there was no peripheral edema  Abdomen: normal bowel sounds, soft, non-tender  Neurological: no focal deficits.   Psychiatric: the affect was normal        LABS:                        9.3    50.09 )-----------( 89       ( 25 May 2023 02:11 )             25.5     05-25    135  |  102  |  46<H>  ----------------------------<  208<H>  4.6   |  21<L>  |  0.87    Ca    8.4      25 May 2023 03:52  Phos  3.7     05-25  Mg     2.2     05-25    TPro  4.7<L>  /  Alb  2.6<L>  /  TBili  8.8<H>  /  DBili  x   /  AST  See Note  /  ALT  18  /  AlkPhos  93  05-25    PT/INR - ( 25 May 2023 02:11 )   PT: 16.9 sec;   INR: 1.42          PTT - ( 25 May 2023 02:11 )  PTT:33.5 sec      MICROBIOLOGY:      RADIOLOGY & ADDITIONAL STUDIES:  Reviewed

## 2023-05-26 LAB
-  AMPICILLIN/SULBACTAM: SIGNIFICANT CHANGE UP
-  AMPICILLIN/SULBACTAM: SIGNIFICANT CHANGE UP
-  AMPICILLIN: SIGNIFICANT CHANGE UP
-  AMPICILLIN: SIGNIFICANT CHANGE UP
-  CEFAZOLIN: SIGNIFICANT CHANGE UP
-  CEFAZOLIN: SIGNIFICANT CHANGE UP
-  CEFTRIAXONE: SIGNIFICANT CHANGE UP
-  CEFTRIAXONE: SIGNIFICANT CHANGE UP
-  CIPROFLOXACIN: SIGNIFICANT CHANGE UP
-  CIPROFLOXACIN: SIGNIFICANT CHANGE UP
-  ERTAPENEM: SIGNIFICANT CHANGE UP
-  ERTAPENEM: SIGNIFICANT CHANGE UP
-  GENTAMICIN: SIGNIFICANT CHANGE UP
-  GENTAMICIN: SIGNIFICANT CHANGE UP
-  PIPERACILLIN/TAZOBACTAM: SIGNIFICANT CHANGE UP
-  PIPERACILLIN/TAZOBACTAM: SIGNIFICANT CHANGE UP
-  TOBRAMYCIN: SIGNIFICANT CHANGE UP
-  TOBRAMYCIN: SIGNIFICANT CHANGE UP
-  TRIMETHOPRIM/SULFAMETHOXAZOLE: SIGNIFICANT CHANGE UP
-  TRIMETHOPRIM/SULFAMETHOXAZOLE: SIGNIFICANT CHANGE UP
ALBUMIN SERPL ELPH-MCNC: 2.5 G/DL — LOW (ref 3.3–5)
ALBUMIN SERPL ELPH-MCNC: 2.6 G/DL — LOW (ref 3.3–5)
ALP SERPL-CCNC: 162 U/L — HIGH (ref 40–120)
ALP SERPL-CCNC: 98 U/L — SIGNIFICANT CHANGE UP (ref 40–120)
ALT FLD-CCNC: 15 U/L — SIGNIFICANT CHANGE UP (ref 10–45)
ALT FLD-CCNC: 18 U/L — SIGNIFICANT CHANGE UP (ref 10–45)
ANION GAP SERPL CALC-SCNC: 8 MMOL/L — SIGNIFICANT CHANGE UP (ref 5–17)
ANION GAP SERPL CALC-SCNC: 9 MMOL/L — SIGNIFICANT CHANGE UP (ref 5–17)
ANISOCYTOSIS BLD QL: SLIGHT — SIGNIFICANT CHANGE UP
APTT BLD: 30.7 SEC — SIGNIFICANT CHANGE UP (ref 27.5–35.5)
APTT BLD: 32.1 SEC — SIGNIFICANT CHANGE UP (ref 27.5–35.5)
AST SERPL-CCNC: 33 U/L — SIGNIFICANT CHANGE UP (ref 10–40)
AST SERPL-CCNC: 39 U/L — SIGNIFICANT CHANGE UP (ref 10–40)
BASE EXCESS BLDA CALC-SCNC: -1.4 MMOL/L — SIGNIFICANT CHANGE UP (ref -2–3)
BASE EXCESS BLDA CALC-SCNC: -2.1 MMOL/L — LOW (ref -2–3)
BASE EXCESS BLDV CALC-SCNC: -1.3 MMOL/L — SIGNIFICANT CHANGE UP (ref -2–3)
BASOPHILS # BLD AUTO: 0 K/UL — SIGNIFICANT CHANGE UP (ref 0–0.2)
BASOPHILS NFR BLD AUTO: 0 % — SIGNIFICANT CHANGE UP (ref 0–2)
BILIRUB DIRECT SERPL-MCNC: 7.5 MG/DL — HIGH (ref 0–0.3)
BILIRUB DIRECT SERPL-MCNC: SIGNIFICANT CHANGE UP (ref 0–0.3)
BILIRUB INDIRECT FLD-MCNC: 1.1 MG/DL — HIGH (ref 0.2–1)
BILIRUB INDIRECT FLD-MCNC: SIGNIFICANT CHANGE UP (ref 0.2–1)
BILIRUB SERPL-MCNC: 7.6 MG/DL — HIGH (ref 0.2–1.2)
BILIRUB SERPL-MCNC: 8.6 MG/DL — HIGH (ref 0.2–1.2)
BLD GP AB SCN SERPL QL: NEGATIVE — SIGNIFICANT CHANGE UP
BUN SERPL-MCNC: 39 MG/DL — HIGH (ref 7–23)
BUN SERPL-MCNC: 42 MG/DL — HIGH (ref 7–23)
BURR CELLS BLD QL SMEAR: PRESENT — SIGNIFICANT CHANGE UP
CALCIUM SERPL-MCNC: 8.2 MG/DL — LOW (ref 8.4–10.5)
CALCIUM SERPL-MCNC: 8.4 MG/DL — SIGNIFICANT CHANGE UP (ref 8.4–10.5)
CHLORIDE SERPL-SCNC: 102 MMOL/L — SIGNIFICANT CHANGE UP (ref 96–108)
CHLORIDE SERPL-SCNC: 102 MMOL/L — SIGNIFICANT CHANGE UP (ref 96–108)
CHLORIDE SERPL-SCNC: 104 MMOL/L — SIGNIFICANT CHANGE UP (ref 96–108)
CK SERPL-CCNC: 64 U/L — SIGNIFICANT CHANGE UP (ref 30–200)
CO2 BLDA-SCNC: 23 MMOL/L — SIGNIFICANT CHANGE UP (ref 19–24)
CO2 BLDA-SCNC: 24 MMOL/L — SIGNIFICANT CHANGE UP (ref 19–24)
CO2 BLDV-SCNC: 25.6 MMOL/L — SIGNIFICANT CHANGE UP (ref 22–26)
CO2 SERPL-SCNC: 22 MMOL/L — SIGNIFICANT CHANGE UP (ref 22–31)
CO2 SERPL-SCNC: 23 MMOL/L — SIGNIFICANT CHANGE UP (ref 22–31)
CO2 SERPL-SCNC: 23 MMOL/L — SIGNIFICANT CHANGE UP (ref 22–31)
COHGB MFR BLDA: 1.3 % — SIGNIFICANT CHANGE UP
CREAT SERPL-MCNC: 0.86 MG/DL — SIGNIFICANT CHANGE UP (ref 0.5–1.3)
CREAT SERPL-MCNC: 0.99 MG/DL — SIGNIFICANT CHANGE UP (ref 0.5–1.3)
CREAT SERPL-MCNC: 1.05 MG/DL — SIGNIFICANT CHANGE UP (ref 0.5–1.3)
CULTURE RESULTS: SIGNIFICANT CHANGE UP
EGFR: 103 ML/MIN/1.73M2 — SIGNIFICANT CHANGE UP
EGFR: 84 ML/MIN/1.73M2 — SIGNIFICANT CHANGE UP
EGFR: 91 ML/MIN/1.73M2 — SIGNIFICANT CHANGE UP
EOSINOPHIL # BLD AUTO: 0 K/UL — SIGNIFICANT CHANGE UP (ref 0–0.5)
EOSINOPHIL NFR BLD AUTO: 0 % — SIGNIFICANT CHANGE UP (ref 0–6)
FIBRINOGEN PPP-MCNC: 325 MG/DL — SIGNIFICANT CHANGE UP (ref 200–445)
FIBRINOGEN PPP-MCNC: 366 MG/DL — SIGNIFICANT CHANGE UP (ref 200–445)
GAS PNL BLDA: SIGNIFICANT CHANGE UP
GAS PNL BLDA: SIGNIFICANT CHANGE UP
GAS PNL BLDV: SIGNIFICANT CHANGE UP
GIANT PLATELETS BLD QL SMEAR: PRESENT — SIGNIFICANT CHANGE UP
GLUCOSE BLDC GLUCOMTR-MCNC: 102 MG/DL — HIGH (ref 70–99)
GLUCOSE BLDC GLUCOMTR-MCNC: 132 MG/DL — HIGH (ref 70–99)
GLUCOSE BLDC GLUCOMTR-MCNC: 91 MG/DL — SIGNIFICANT CHANGE UP (ref 70–99)
GLUCOSE SERPL-MCNC: 107 MG/DL — HIGH (ref 70–99)
GLUCOSE SERPL-MCNC: 125 MG/DL — HIGH (ref 70–99)
GLUCOSE SERPL-MCNC: 126 MG/DL — HIGH (ref 70–99)
GRAM STN FLD: SIGNIFICANT CHANGE UP
HCO3 BLDA-SCNC: 22 MMOL/L — SIGNIFICANT CHANGE UP (ref 21–28)
HCO3 BLDA-SCNC: 23 MMOL/L — SIGNIFICANT CHANGE UP (ref 21–28)
HCO3 BLDV-SCNC: 24 MMOL/L — SIGNIFICANT CHANGE UP (ref 22–29)
HCT VFR BLD CALC: 19.7 % — CRITICAL LOW (ref 39–50)
HCT VFR BLD CALC: 21.1 % — LOW (ref 39–50)
HCT VFR BLD CALC: 21.1 % — LOW (ref 39–50)
HCT VFR BLD CALC: 21.7 % — LOW (ref 39–50)
HGB BLD-MCNC: 6.9 G/DL — CRITICAL LOW (ref 13–17)
HGB BLD-MCNC: 7.5 G/DL — LOW (ref 13–17)
HGB BLD-MCNC: 7.7 G/DL — LOW (ref 13–17)
HGB BLD-MCNC: 7.9 G/DL — LOW (ref 13–17)
HGB BLDA-MCNC: 8 G/DL — LOW (ref 12.6–17.4)
HYPOCHROMIA BLD QL: SLIGHT — SIGNIFICANT CHANGE UP
INR BLD: 1.17 — HIGH (ref 0.88–1.16)
INR BLD: 1.2 — HIGH (ref 0.88–1.16)
LACOSAMIDE (VIMPAT) RESULT: 23.41 UG/ML — HIGH (ref 1–10)
LACTATE SERPL-SCNC: 0.8 MMOL/L — SIGNIFICANT CHANGE UP (ref 0.5–2)
LYMPHOCYTES # BLD AUTO: 1.17 K/UL — SIGNIFICANT CHANGE UP (ref 1–3.3)
LYMPHOCYTES # BLD AUTO: 3.5 % — LOW (ref 13–44)
MACROCYTES BLD QL: SLIGHT — SIGNIFICANT CHANGE UP
MAGNESIUM SERPL-MCNC: 2.4 MG/DL — SIGNIFICANT CHANGE UP (ref 1.6–2.6)
MANUAL SMEAR VERIFICATION: SIGNIFICANT CHANGE UP
MCHC RBC-ENTMCNC: 31.4 PG — SIGNIFICANT CHANGE UP (ref 27–34)
MCHC RBC-ENTMCNC: 31.7 PG — SIGNIFICANT CHANGE UP (ref 27–34)
MCHC RBC-ENTMCNC: 31.9 PG — SIGNIFICANT CHANGE UP (ref 27–34)
MCHC RBC-ENTMCNC: 32.1 PG — SIGNIFICANT CHANGE UP (ref 27–34)
MCHC RBC-ENTMCNC: 35 GM/DL — SIGNIFICANT CHANGE UP (ref 32–36)
MCHC RBC-ENTMCNC: 35.5 GM/DL — SIGNIFICANT CHANGE UP (ref 32–36)
MCHC RBC-ENTMCNC: 36.4 GM/DL — HIGH (ref 32–36)
MCHC RBC-ENTMCNC: 36.5 GM/DL — HIGH (ref 32–36)
MCV RBC AUTO: 87.5 FL — SIGNIFICANT CHANGE UP (ref 80–100)
MCV RBC AUTO: 87.9 FL — SIGNIFICANT CHANGE UP (ref 80–100)
MCV RBC AUTO: 88.3 FL — SIGNIFICANT CHANGE UP (ref 80–100)
MCV RBC AUTO: 90.4 FL — SIGNIFICANT CHANGE UP (ref 80–100)
METHGB MFR BLDA: 2.1 % — HIGH
METHOD TYPE: SIGNIFICANT CHANGE UP
METHOD TYPE: SIGNIFICANT CHANGE UP
MONOCYTES # BLD AUTO: 0.57 K/UL — SIGNIFICANT CHANGE UP (ref 0–0.9)
MONOCYTES NFR BLD AUTO: 1.7 % — LOW (ref 2–14)
NEUTROPHILS # BLD AUTO: 31.67 K/UL — HIGH (ref 1.8–7.4)
NEUTROPHILS NFR BLD AUTO: 94.8 % — HIGH (ref 43–77)
NRBC # BLD: 0 /100 WBCS — SIGNIFICANT CHANGE UP (ref 0–0)
ORGANISM # SPEC MICROSCOPIC CNT: SIGNIFICANT CHANGE UP
OXYHGB MFR BLDA: 96.1 % — HIGH (ref 90–95)
PCO2 BLDA: 35 MMHG — SIGNIFICANT CHANGE UP (ref 35–48)
PCO2 BLDA: 36 MMHG — SIGNIFICANT CHANGE UP (ref 35–48)
PCO2 BLDV: 43 MMHG — SIGNIFICANT CHANGE UP (ref 42–55)
PH BLDA: 7.41 — SIGNIFICANT CHANGE UP (ref 7.35–7.45)
PH BLDA: 7.41 — SIGNIFICANT CHANGE UP (ref 7.35–7.45)
PH BLDV: 7.36 — SIGNIFICANT CHANGE UP (ref 7.32–7.43)
PHOSPHATE SERPL-MCNC: 3.2 MG/DL — SIGNIFICANT CHANGE UP (ref 2.5–4.5)
PHOSPHATE SERPL-MCNC: 3.5 MG/DL — SIGNIFICANT CHANGE UP (ref 2.5–4.5)
PHOSPHATE SERPL-MCNC: 3.7 MG/DL — SIGNIFICANT CHANGE UP (ref 2.5–4.5)
PLAT MORPH BLD: ABNORMAL
PLATELET # BLD AUTO: 72 K/UL — LOW (ref 150–400)
PLATELET # BLD AUTO: 79 K/UL — LOW (ref 150–400)
PLATELET # BLD AUTO: 81 K/UL — LOW (ref 150–400)
PLATELET # BLD AUTO: 85 K/UL — LOW (ref 150–400)
PO2 BLDA: 155 MMHG — HIGH (ref 83–108)
PO2 BLDA: 190 MMHG — HIGH (ref 83–108)
PO2 BLDV: 43 MMHG — SIGNIFICANT CHANGE UP (ref 25–45)
POIKILOCYTOSIS BLD QL AUTO: SLIGHT — SIGNIFICANT CHANGE UP
POTASSIUM SERPL-MCNC: 4.1 MMOL/L — SIGNIFICANT CHANGE UP (ref 3.5–5.3)
POTASSIUM SERPL-MCNC: 4.1 MMOL/L — SIGNIFICANT CHANGE UP (ref 3.5–5.3)
POTASSIUM SERPL-MCNC: 4.2 MMOL/L — SIGNIFICANT CHANGE UP (ref 3.5–5.3)
POTASSIUM SERPL-SCNC: 4.1 MMOL/L — SIGNIFICANT CHANGE UP (ref 3.5–5.3)
POTASSIUM SERPL-SCNC: 4.1 MMOL/L — SIGNIFICANT CHANGE UP (ref 3.5–5.3)
POTASSIUM SERPL-SCNC: 4.2 MMOL/L — SIGNIFICANT CHANGE UP (ref 3.5–5.3)
PROT SERPL-MCNC: 4.7 G/DL — LOW (ref 6–8.3)
PROT SERPL-MCNC: 4.9 G/DL — LOW (ref 6–8.3)
PROTHROM AB SERPL-ACNC: 13.9 SEC — HIGH (ref 10.5–13.4)
PROTHROM AB SERPL-ACNC: 14.3 SEC — HIGH (ref 10.5–13.4)
RBC # BLD: 2.18 M/UL — LOW (ref 4.2–5.8)
RBC # BLD: 2.39 M/UL — LOW (ref 4.2–5.8)
RBC # BLD: 2.4 M/UL — LOW (ref 4.2–5.8)
RBC # BLD: 2.48 M/UL — LOW (ref 4.2–5.8)
RBC # FLD: 16.2 % — HIGH (ref 10.3–14.5)
RBC # FLD: 16.7 % — HIGH (ref 10.3–14.5)
RBC # FLD: 17.1 % — HIGH (ref 10.3–14.5)
RBC # FLD: 17.2 % — HIGH (ref 10.3–14.5)
RBC BLD AUTO: ABNORMAL
RH IG SCN BLD-IMP: POSITIVE — SIGNIFICANT CHANGE UP
SAO2 % BLDA: 99.4 % — HIGH (ref 94–98)
SAO2 % BLDA: 99.7 % — HIGH (ref 94–98)
SAO2 % BLDV: 76.3 % — SIGNIFICANT CHANGE UP (ref 67–88)
SCHISTOCYTES BLD QL AUTO: SLIGHT — SIGNIFICANT CHANGE UP
SMUDGE CELLS # BLD: PRESENT — SIGNIFICANT CHANGE UP
SODIUM SERPL-SCNC: 133 MMOL/L — LOW (ref 135–145)
SODIUM SERPL-SCNC: 135 MMOL/L — SIGNIFICANT CHANGE UP (ref 135–145)
SPECIMEN SOURCE: SIGNIFICANT CHANGE UP
TARGETS BLD QL SMEAR: SLIGHT — SIGNIFICANT CHANGE UP
TRIGL SERPL-MCNC: 152 MG/DL — HIGH
WBC # BLD: 31.43 K/UL — HIGH (ref 3.8–10.5)
WBC # BLD: 33.41 K/UL — HIGH (ref 3.8–10.5)
WBC # BLD: 35.14 K/UL — HIGH (ref 3.8–10.5)
WBC # BLD: 36.06 K/UL — HIGH (ref 3.8–10.5)
WBC # FLD AUTO: 31.43 K/UL — HIGH (ref 3.8–10.5)
WBC # FLD AUTO: 33.41 K/UL — HIGH (ref 3.8–10.5)
WBC # FLD AUTO: 35.14 K/UL — HIGH (ref 3.8–10.5)
WBC # FLD AUTO: 36.06 K/UL — HIGH (ref 3.8–10.5)

## 2023-05-26 PROCEDURE — 75984 XRAY CONTROL CATHETER CHANGE: CPT | Mod: 26,76

## 2023-05-26 PROCEDURE — 49423 EXCHANGE DRAINAGE CATHETER: CPT | Mod: 59

## 2023-05-26 PROCEDURE — 49406 IMAGE CATH FLUID PERI/RETRO: CPT | Mod: 59

## 2023-05-26 PROCEDURE — 99232 SBSQ HOSP IP/OBS MODERATE 35: CPT

## 2023-05-26 RX ORDER — DEXTROSE 50 % IN WATER 50 %
12.5 SYRINGE (ML) INTRAVENOUS ONCE
Refills: 0 | Status: DISCONTINUED | OUTPATIENT
Start: 2023-05-26 | End: 2023-05-31

## 2023-05-26 RX ORDER — DEXTROSE 50 % IN WATER 50 %
25 SYRINGE (ML) INTRAVENOUS ONCE
Refills: 0 | Status: DISCONTINUED | OUTPATIENT
Start: 2023-05-26 | End: 2023-05-31

## 2023-05-26 RX ORDER — SODIUM CHLORIDE 9 MG/ML
4 INJECTION INTRAMUSCULAR; INTRAVENOUS; SUBCUTANEOUS EVERY 6 HOURS
Refills: 0 | Status: DISCONTINUED | OUTPATIENT
Start: 2023-05-26 | End: 2023-05-27

## 2023-05-26 RX ORDER — DEXTROSE 50 % IN WATER 50 %
15 SYRINGE (ML) INTRAVENOUS ONCE
Refills: 0 | Status: DISCONTINUED | OUTPATIENT
Start: 2023-05-26 | End: 2023-05-31

## 2023-05-26 RX ORDER — GLUCAGON INJECTION, SOLUTION 0.5 MG/.1ML
1 INJECTION, SOLUTION SUBCUTANEOUS ONCE
Refills: 0 | Status: DISCONTINUED | OUTPATIENT
Start: 2023-05-26 | End: 2023-05-31

## 2023-05-26 RX ORDER — ACETYLCYSTEINE 200 MG/ML
4 VIAL (ML) MISCELLANEOUS EVERY 6 HOURS
Refills: 0 | Status: DISCONTINUED | OUTPATIENT
Start: 2023-05-26 | End: 2023-05-27

## 2023-05-26 RX ORDER — SODIUM CHLORIDE 9 MG/ML
1000 INJECTION, SOLUTION INTRAVENOUS
Refills: 0 | Status: DISCONTINUED | OUTPATIENT
Start: 2023-05-26 | End: 2023-05-31

## 2023-05-26 RX ORDER — PROPOFOL 10 MG/ML
10 INJECTION, EMULSION INTRAVENOUS
Qty: 1000 | Refills: 0 | Status: DISCONTINUED | OUTPATIENT
Start: 2023-05-26 | End: 2023-05-27

## 2023-05-26 RX ORDER — CHLORHEXIDINE GLUCONATE 213 G/1000ML
15 SOLUTION TOPICAL EVERY 12 HOURS
Refills: 0 | Status: DISCONTINUED | OUTPATIENT
Start: 2023-05-26 | End: 2023-05-31

## 2023-05-26 RX ORDER — MEROPENEM 1 G/30ML
1000 INJECTION INTRAVENOUS EVERY 8 HOURS
Refills: 0 | Status: DISCONTINUED | OUTPATIENT
Start: 2023-05-26 | End: 2023-05-31

## 2023-05-26 RX ORDER — INSULIN LISPRO 100/ML
VIAL (ML) SUBCUTANEOUS EVERY 6 HOURS
Refills: 0 | Status: DISCONTINUED | OUTPATIENT
Start: 2023-05-26 | End: 2023-05-31

## 2023-05-26 RX ORDER — METOCLOPRAMIDE HCL 10 MG
5 TABLET ORAL EVERY 8 HOURS
Refills: 0 | Status: DISCONTINUED | OUTPATIENT
Start: 2023-05-26 | End: 2023-05-26

## 2023-05-26 RX ORDER — FENTANYL CITRATE 50 UG/ML
0.5 INJECTION INTRAVENOUS
Qty: 5000 | Refills: 0 | Status: DISCONTINUED | OUTPATIENT
Start: 2023-05-26 | End: 2023-05-29

## 2023-05-26 RX ORDER — NOREPINEPHRINE BITARTRATE/D5W 8 MG/250ML
0.07 PLASTIC BAG, INJECTION (ML) INTRAVENOUS
Qty: 8 | Refills: 0 | Status: DISCONTINUED | OUTPATIENT
Start: 2023-05-26 | End: 2023-05-27

## 2023-05-26 RX ORDER — DIATRIZOATE MEGLUMINE 180 MG/ML
20 INJECTION, SOLUTION INTRAVESICAL ONCE
Refills: 0 | Status: COMPLETED | OUTPATIENT
Start: 2023-05-26 | End: 2023-05-26

## 2023-05-26 RX ORDER — IPRATROPIUM/ALBUTEROL SULFATE 18-103MCG
3 AEROSOL WITH ADAPTER (GRAM) INHALATION EVERY 6 HOURS
Refills: 0 | Status: DISCONTINUED | OUTPATIENT
Start: 2023-05-26 | End: 2023-05-31

## 2023-05-26 RX ORDER — ELECTROLYTE SOLUTION,INJ
1 VIAL (ML) INTRAVENOUS
Refills: 0 | Status: DISCONTINUED | OUTPATIENT
Start: 2023-05-26 | End: 2023-05-26

## 2023-05-26 RX ADMIN — LACOSAMIDE 150 MILLIGRAM(S): 50 TABLET ORAL at 23:47

## 2023-05-26 RX ADMIN — Medication 1 APPLICATION(S): at 13:29

## 2023-05-26 RX ADMIN — Medication 216 GRAM(S): at 00:28

## 2023-05-26 RX ADMIN — DIATRIZOATE MEGLUMINE 20 MILLILITER(S): 180 INJECTION, SOLUTION INTRAVESICAL at 07:39

## 2023-05-26 RX ADMIN — Medication 216 GRAM(S): at 05:26

## 2023-05-26 RX ADMIN — Medication 1 APPLICATION(S): at 23:47

## 2023-05-26 RX ADMIN — SODIUM CHLORIDE 4 MILLILITER(S): 9 INJECTION INTRAMUSCULAR; INTRAVENOUS; SUBCUTANEOUS at 16:04

## 2023-05-26 RX ADMIN — Medication 1 EACH: at 18:42

## 2023-05-26 RX ADMIN — Medication 1 APPLICATION(S): at 05:26

## 2023-05-26 RX ADMIN — MIDAZOLAM HYDROCHLORIDE 1.4 MG/KG/HR: 1 INJECTION, SOLUTION INTRAMUSCULAR; INTRAVENOUS at 06:43

## 2023-05-26 RX ADMIN — Medication 1 APPLICATION(S): at 19:36

## 2023-05-26 RX ADMIN — Medication 5 MILLIGRAM(S): at 13:42

## 2023-05-26 RX ADMIN — MEROPENEM 100 MILLIGRAM(S): 1 INJECTION INTRAVENOUS at 21:32

## 2023-05-26 RX ADMIN — Medication 3 MILLILITER(S): at 21:31

## 2023-05-26 RX ADMIN — Medication 3 MILLILITER(S): at 16:04

## 2023-05-26 RX ADMIN — Medication 216 GRAM(S): at 13:28

## 2023-05-26 RX ADMIN — Medication 4 MILLILITER(S): at 21:31

## 2023-05-26 RX ADMIN — Medication 81 MILLIGRAM(S): at 13:42

## 2023-05-26 RX ADMIN — Medication 5 MILLIGRAM(S): at 07:39

## 2023-05-26 RX ADMIN — PANTOPRAZOLE SODIUM 40 MILLIGRAM(S): 20 TABLET, DELAYED RELEASE ORAL at 13:29

## 2023-05-26 RX ADMIN — HEPARIN SODIUM 5000 UNIT(S): 5000 INJECTION INTRAVENOUS; SUBCUTANEOUS at 13:42

## 2023-05-26 RX ADMIN — Medication 4 MILLILITER(S): at 16:04

## 2023-05-26 RX ADMIN — HEPARIN SODIUM 5000 UNIT(S): 5000 INJECTION INTRAVENOUS; SUBCUTANEOUS at 05:25

## 2023-05-26 RX ADMIN — MEROPENEM 100 MILLIGRAM(S): 1 INJECTION INTRAVENOUS at 05:25

## 2023-05-26 RX ADMIN — LACOSAMIDE 150 MILLIGRAM(S): 50 TABLET ORAL at 12:25

## 2023-05-26 RX ADMIN — SODIUM CHLORIDE 4 MILLILITER(S): 9 INJECTION INTRAMUSCULAR; INTRAVENOUS; SUBCUTANEOUS at 21:31

## 2023-05-26 RX ADMIN — CISATRACURIUM BESYLATE 12.6 MICROGRAM(S)/KG/MIN: 2 INJECTION INTRAVENOUS at 05:26

## 2023-05-26 RX ADMIN — MEROPENEM 100 MILLIGRAM(S): 1 INJECTION INTRAVENOUS at 13:43

## 2023-05-26 RX ADMIN — CASPOFUNGIN ACETATE 260 MILLIGRAM(S): 7 INJECTION, POWDER, LYOPHILIZED, FOR SOLUTION INTRAVENOUS at 12:25

## 2023-05-26 RX ADMIN — Medication 216 GRAM(S): at 23:47

## 2023-05-26 RX ADMIN — CHLORHEXIDINE GLUCONATE 1 APPLICATION(S): 213 SOLUTION TOPICAL at 05:26

## 2023-05-26 RX ADMIN — CHLORHEXIDINE GLUCONATE 15 MILLILITER(S): 213 SOLUTION TOPICAL at 18:42

## 2023-05-26 RX ADMIN — PROPOFOL 4.2 MICROGRAM(S)/KG/MIN: 10 INJECTION, EMULSION INTRAVENOUS at 16:04

## 2023-05-26 RX ADMIN — HEPARIN SODIUM 5000 UNIT(S): 5000 INJECTION INTRAVENOUS; SUBCUTANEOUS at 21:31

## 2023-05-26 RX ADMIN — Medication 216 GRAM(S): at 18:41

## 2023-05-26 NOTE — PROGRESS NOTE ADULT - NUTRITIONAL ASSESSMENT
53yMale w/ PMHx of HTN, type A aortic dissection s/p Dacron grafts, AV resuspension in 2013, CAD s/p CABG x 1 SVG to RCA (5/2013 with Dr. Medina), seizure disorder, recent admission 3/20-4/14 for replacement of transverse aortic arch, second stage TEVAR and aorto-axillary bypass, AV replacement (bio 23mm), and CABG x 1 (SVG-RCA) EF 75% (Douglaster, 3/20). Post op c/b severe cardiogenic and vasogenic shock, TREY requiring CVVHD and temporary dialysis, eventually came off RRT and discharged home mid April. pt then presented to [facility] ED (4/27-4/30) for syncope vs seizure episode at home. negative neuro work up for Seizures AED dose adjusted. Presented to [facility] on 5/4 for abdominal pain, imaging revealed recent graft endoleak, transferred to [facility] on 5/5 for surgical mgmt. Pt taken to OR for Second stage TEVAR on 5/5, post op course c/b Klebsiella bacteremia (5/15), resp failure requiring intubation on 5/18, Bronch 5/19 with cx growing kleb, enterobacter (zosyn, erta resistant), E faecalis, strep angionosus, hemorrhagic pancreatitis of unclear etiology with venu-pancreatic abscess (per CT A/P from 5/8 and 5/13) s/p IR aspiration of peripancreatic fluid collection (15cc sanguinous pansensitive kleb 5/22) and paracentesis (4L clear yellow fluid, ascites cx negative) on 5/22, IR venu-pancreatic abscess drainage and placement of drainage catheter on 5/24, as well as PEA arrest on . pt now transferred from CTICU to SICU for further mgmt of hemorrhagic pancreatitis.    Plan:  Problem 1: s/p TEVAR   -pt was transferred to SICU for further management of hemorrhagic pancreatitis  -s/p IR guided drainage  -continue ASA   -continue to monitor BP and HR  -remainder of care of pancreatitis per primary team   -CT surgery will continue to follow    Problem 2: Respiratory failure   -pt remains intubated  -continue abx per ID  -care per primary team     Problem 3: Hemorrhagic pancreatitis   -s/p IR drainage  -care per primary team     Problem 4: Seizure disorder  -continue Vimpat and appreciate neuro recs  -care per primary team     I have reviewed clinical labs tests and reports, radiology tests and reports, as well as old patient medical records, and discussed with the referring physician.

## 2023-05-26 NOTE — PROGRESS NOTE ADULT - SUBJECTIVE AND OBJECTIVE BOX
INFECTIOUS DISEASES CONSULT FOLLOW-UP NOTE    INTERVAL HPI/OVERNIGHT EVENTS:      ROS:   Constitutional, eyes, ENT, cardiovascular, respiratory, gastrointestinal, genitourinary, integumentary, neurological, psychiatric and heme/lymph are otherwise negative other than noted above       ANTIBIOTICS/RELEVANT:    MEDICATIONS  (STANDING):  ampicillin  IVPB 2 Gram(s) IV Intermittent every 6 hours  aspirin  chewable 81 milliGRAM(s) Oral daily  caspofungin IVPB      caspofungin IVPB 50 milliGRAM(s) IV Intermittent every 24 hours  chlorhexidine 0.12% Liquid 15 milliLiter(s) Oral Mucosa every 12 hours  chlorhexidine 2% Cloths 1 Application(s) Topical daily  cisatracurium Infusion 3 MICROgram(s)/kG/Min (12.6 mL/Hr) IV Continuous <Continuous>  CRRT Treatment    <Continuous>  dextrose 5%. 1000 milliLiter(s) (100 mL/Hr) IV Continuous <Continuous>  dextrose 5%. 1000 milliLiter(s) (50 mL/Hr) IV Continuous <Continuous>  dextrose 50% Injectable 25 Gram(s) IV Push once  dextrose 50% Injectable 12.5 Gram(s) IV Push once  dextrose 50% Injectable 25 Gram(s) IV Push once  DOBUTamine Infusion 2.5 MICROgram(s)/kG/Min (2.63 mL/Hr) IV Continuous <Continuous>  fentaNYL   Infusion... 0.5 MICROgram(s)/kG/Hr (1.75 mL/Hr) IV Continuous <Continuous>  glucagon  Injectable 1 milliGRAM(s) IntraMuscular once  heparin   Injectable 5000 Unit(s) SubCutaneous every 8 hours  insulin lispro (ADMELOG) corrective regimen sliding scale   SubCutaneous every 6 hours  lacosamide IVPB 250 milliGRAM(s) IV Intermittent every 12 hours  meropenem  IVPB 1000 milliGRAM(s) IV Intermittent every 8 hours  metoclopramide Injectable 5 milliGRAM(s) IV Push every 8 hours  midazolam Infusion 0.02 mG/kG/Hr (1.4 mL/Hr) IV Continuous <Continuous>  pantoprazole  Injectable 40 milliGRAM(s) IV Push daily  Parenteral Nutrition - Adult 1 Each (70 mL/Hr) TPN Continuous <Continuous>  Parenteral Nutrition - Adult 1 Each (70 mL/Hr) TPN Continuous <Continuous>  petrolatum Ophthalmic Ointment 1 Application(s) Both EYES every 6 hours  Phoxillum Filtration BK 4 / 2.5 5000 milliLiter(s) (2000 mL/Hr) CRRT <Continuous>    MEDICATIONS  (PRN):  dextrose Oral Gel 15 Gram(s) Oral once PRN Blood Glucose LESS THAN 70 milliGRAM(s)/deciliter        Vital Signs Last 24 Hrs  T(C): 36.1 (26 May 2023 09:00), Max: 36.8 (26 May 2023 05:49)  T(F): 97 (26 May 2023 09:00), Max: 98.2 (26 May 2023 05:49)  HR: 117 (26 May 2023 09:00) (101 - 124)  BP: --  BP(mean): --  RR: 22 (26 May 2023 09:00) (16 - 22)  SpO2: 97% (26 May 2023 09:00) (94% - 100%)    Parameters below as of 26 May 2023 09:00  Patient On (Oxygen Delivery Method): ventilator    O2 Concentration (%): 50    05-25-23 @ 07:01  -  05-26-23 @ 07:00  --------------------------------------------------------  IN: 3900.9 mL / OUT: 5226 mL / NET: -1325.1 mL    05-26-23 @ 07:01  -  05-26-23 @ 11:24  --------------------------------------------------------  IN: 275.3 mL / OUT: 50 mL / NET: 225.3 mL      PHYSICAL EXAM:  patient away for CT scan        LABS:                        7.7    33.41 )-----------( 79       ( 26 May 2023 04:55 )             21.1     05-26    133<L>  |  102  |  39<H>  ----------------------------<  126<H>  4.1   |  22  |  0.86    Ca    8.2<L>      26 May 2023 04:55  Phos  3.2     05-26  Mg     2.4     05-26    TPro  4.7<L>  /  Alb  2.5<L>  /  TBili  7.6<H>  /  DBili  See Note  /  AST  33  /  ALT  15  /  AlkPhos  98  05-26    PT/INR - ( 26 May 2023 06:35 )   PT: 13.9 sec;   INR: 1.17          PTT - ( 26 May 2023 06:35 )  PTT:30.7 sec      MICROBIOLOGY:      RADIOLOGY & ADDITIONAL STUDIES:  Reviewed

## 2023-05-26 NOTE — PROGRESS NOTE ADULT - ASSESSMENT
53yMale w/ PMHx of HTN, type A aortic dissection s/p Dacron grafts, AV resuspension in 2013, CAD s/p CABG x 1 SVG to RCA (5/2013 with Dr. Medina), seizure disorder, recent admission 3/20-4/14 for replacement of transverse aortic arch, second stage TEVAR and aorto-axillary bypass, AV replacement (bio 23mm), and CABG x 1 (SVG-RCA) EF 75% (Douglaster, 3/20). Post op c/b severe cardiogenic and vasogenic shock, TREY requiring CVVHD and temporary dialysis, eventually came off RRT and discharged home mid April. pt then presented to [facility] ED (4/27-4/30) for syncope vs seizure episode at home. negative neuro work up for Seizures AED dose adjusted. Presented to [facility] on 5/4 for abdominal pain, imaging revealed recent graft endoleak, transferred to [facility] on 5/5 for surgical mgmt. Pt taken to OR for Second stage TEVAR on 5/5, post op course c/b Klebsiella bacteremia (5/15), resp failure requiring intubation on 5/18, Bronch 5/19 with cx growing kleb, enterobacter (zosyn, erta resistant), E faecalis, strep angionosus, hemorrhagic pancreatitis of unclear etiology with venu-pancreatic abscess (per CT A/P from 5/8 and 5/13) s/p IR aspiration of peripancreatic fluid collection (15cc sanguinous pansensitive kleb 5/22) and paracentesis (4L clear yellow fluid, ascites cx negative) on 5/22, IR venu-pancreatic abscess drainage and placement of drainage catheter on 5/24, as well as PEA arrest. pt now transferred from CTICU to SICU for further mgmt of hemorrhagic pancreatitis.    Plan:  Problem 1: s/p TEVAR   -pt was transferred to SICU for further management of hemorrhagic pancreatitis  -s/p IR guided drainage  -continue ASA   -continue to monitor BP and HR  -remainder of care of pancreatitis per primary team   -CT surgery will continue to follow    Problem 2: Respiratory failure   -pt remains intubated  -continue abx per ID  -care per primary team     Problem 3: Hemorrhagic pancreatitis   -s/p IR drainage  -care per primary team     Problem 4: Seizure disorder  -continue Vimpat and appreciate neuro recs  -care per primary team     I have reviewed clinical labs tests and reports, radiology tests and reports, as well as old patient medical records, and discussed with the referring physician.

## 2023-05-26 NOTE — PROGRESS NOTE ADULT - ATTENDING COMMENTS
CAD, aortic dissection s/p second stage TEVAR c/b hemorrhagic pancreatitis, septic shock with Klebsiella bacteremia, pna, peritonitis, acute hypoxemic respiratory failure, ATN  physical as above  s/p further drainage procedures as outlined by IR  continue meropenem/ampicillin/caspofungin  CVVH  on 50% oxygen now, tapering Armaan  PEEEP8  off dobutamine on small dose of NE  eventually hope to start some fluid removal  TPN written

## 2023-05-26 NOTE — PROGRESS NOTE ADULT - SUBJECTIVE AND OBJECTIVE BOX
Patient is a 53y Male seen and evaluated at bedside on CVVHD . patient transferred to SICU- off pressor support this AM bonny chandler had another paracentesis yesterday with 1.7L removed pending CT scan this AM Na 133 K 4.1 phos 3.2       Meds:    ampicillin  IVPB 2 every 6 hours  aspirin  chewable 81 daily  caspofungin IVPB    caspofungin IVPB 50 every 24 hours  chlorhexidine 0.12% Liquid 15 every 12 hours  chlorhexidine 2% Cloths 1 daily  cisatracurium Infusion 3 <Continuous>  CRRT Treatment  <Continuous>  dextrose 5%. 1000 <Continuous>  dextrose 5%. 1000 <Continuous>  dextrose 50% Injectable 25 once  dextrose 50% Injectable 12.5 once  dextrose 50% Injectable 25 once  dextrose Oral Gel 15 once PRN  DOBUTamine Infusion 2.5 <Continuous>  fentaNYL   Infusion... 0.5 <Continuous>  glucagon  Injectable 1 once  heparin   Injectable 5000 every 8 hours  insulin lispro (ADMELOG) corrective regimen sliding scale  every 6 hours  lacosamide IVPB 250 every 12 hours  meropenem  IVPB 1000 every 8 hours  metoclopramide Injectable 5 every 8 hours  midazolam Infusion 0.02 <Continuous>  pantoprazole  Injectable 40 daily  Parenteral Nutrition - Adult 1 <Continuous>  Parenteral Nutrition - Adult 1 <Continuous>  petrolatum Ophthalmic Ointment 1 every 6 hours  Phoxillum Filtration BK 4 / 2.5 5000 <Continuous>      T(C): , Max: 36.8 (05-26-23 @ 05:49)  T(F): , Max: 98.2 (05-26-23 @ 05:49)  HR: 117 (05-26-23 @ 09:00)  BP: --  BP(mean): --  RR: 22 (05-26-23 @ 09:00)  SpO2: 97% (05-26-23 @ 09:00)  Wt(kg): --    05-25 @ 07:01  -  05-26 @ 07:00  --------------------------------------------------------  IN: 3900.9 mL / OUT: 5226 mL / NET: -1325.1 mL    05-26 @ 07:01  -  05-26 @ 10:13  --------------------------------------------------------  IN: 111.4 mL / OUT: 0 mL / NET: 111.4 mL          Review of Systems:  unable to participate         PHYSICAL EXAM:  GENERAL: intubated; sedated   CHEST/LUNG: mechanical breath sounds BL  HEART: normal S1S2, RRR  ABDOMEN: Soft, Nontender, +BS, No flank tenderness bilateral  EXTREMITIES: + edema BL LE  SKIN: no lesions or rashes   ACCESS: R IJ HD cath       LABS:                        7.7    33.41 )-----------( 79       ( 26 May 2023 04:55 )             21.1     05-26    133<L>  |  102  |  39<H>  ----------------------------<  126<H>  4.1   |  22  |  0.86    Ca    8.2<L>      26 May 2023 04:55  Phos  3.2     05-26  Mg     2.4     05-26    TPro  4.7<L>  /  Alb  2.5<L>  /  TBili  7.6<H>  /  DBili  See Note  /  AST  33  /  ALT  15  /  AlkPhos  98  05-26      PT/INR - ( 26 May 2023 06:35 )   PT: 13.9 sec;   INR: 1.17          PTT - ( 26 May 2023 06:35 )  PTT:30.7 sec          RADIOLOGY & ADDITIONAL STUDIES:        Hemoglobin: 7.7 g/dL (05-26-23 @ 04:55)  Phosphorus Level, Serum: 3.2 mg/dL (05-26-23 @ 04:55)  Hemoglobin: 7.5 g/dL (05-26-23 @ 00:40)  Hemoglobin: 6.4 g/dL (05-25-23 @ 20:43)    Albumin, Serum: 2.5 g/dL (05-26-23 @ 04:55)  Albumin, Serum: 2.6 g/dL (05-25-23 @ 19:27)      CRRT Treatment:   Modality: CVVHD, Filter: NxStage CAR-505, Target Blood Flow: 300 mL/Min  Target Fluid Balance: Titrate to net NEGATIVE fluid balance, 200 mL/Hr (05-25-23 @ 14:42) [Active]  Phoxillum Filtration BK 4 / 2.5: Solution, 5000 milliLiter(s) infuse at 2000 mL/Hr; infuse through CRRT Circuit  Administration Instructions: Continuous Renal Replacement Therapy (CRRT)  Special Instructions: Dialysate (05-25-23 @ 14:42) [Active]

## 2023-05-26 NOTE — PROGRESS NOTE ADULT - ASSESSMENT
53yMale w/ PMHx of HTN, type A aortic dissection s/p Dacron grafts, AV resuspension in 2013, CAD s/p CABG x 1 SVG to RCA (5/2013 with Dr. Medina), seizure disorder, recent admission 3/20-4/14 for replacement of transverse aortic arch, second stage TEVAR and aorto-axillary bypass, AV replacement (bio 23mm), and CABG x 1 (SVG-RCA) EF 75% (Douglaster, 3/20). Post op c/b severe cardiogenic and vasogenic shock, TREY requiring CVVHD and temporary dialysis, eventually came off RRT and discharged home mid April. pt then presented to [facility] ED (4/27-4/30) for syncope vs seizure episode at home. negative neuro work up for Seizures AED dose adjusted. Presented to [place] on 5/4 for abdominal pain, imaging revealed recent graft endoleak, transferred to [facility] on 5/5 for surgical mgmt. Pt taken to OR for Second stage TEVAR on 5/5, post op course c/b Klebsiella bacteremia (5/15), resp failure requiring intubation on 5/18, Bronch 5/19 with cx growing kleb, enterobacter (zosyn, erta resistant), E faecalis, strep angionosus, hemorrhagic pancreatitis of unclear etiology with venu-pancreatic abscess (per CT A/P from 5/8 and 5/13) s/p IR aspiration of peripancreatic fluid collection (15cc sanguinous pansensitive kleb 5/22) and paracentesis (4L clear yellow fluid, ascites cx negative) on 5/22, IR venu-pancreatic abscess drainage and placement of drainage catheter on 5/24, as well as PEA arrest on . pt now transferred from CTICU to SICU for further mgmt of hemorrhagic pancreatitis.       NEURO: Propofol/Fentanyl drip. Hx seizure with episode of seizure on 5/12 s/p epilepsy consult, on vimpat,. Depakote weaned off due to concerns for drug-related hemorrhagic pancreatitis.   CV: s/p PEA arrest on 5/24 night, Septic shock: On Levophed for MAP> 65 . Echo from 5/19 hyperdynamic LV, SHAJI with LVOTO (gradient 26), normal RV, mild MR, no pulm htn,   PULM: intubated 5/13 on AC 40/500/22/8, ARDS now improved off NO, s/p multiple Mucus plugs and bronchs most recently on 5/26), bronch cx from 5/19 kleb, enterobacter (zosyn, erta resistant), E faecalis, strep angionosus.   GI/FEN: NPO on TPN, protonix ppx, Surg intially consulted for acute pancreatitis on 5/8, then reconsulted on 5/13 with concerns for active hemorrhage of pancreatic tail and peripancreatic abscess seen on repeat CT, s/p negative mesenteric angio on 5/13, s/p paracentesis cx neg on 5/22, venu-pancreatic abscess s/p aspiration 5/22, then drain placement 5/24. Upsizing of current drain, secondary drain placement, 2 peritoneal drains placed 5/25. Additional 2 drains placed 5/26 with upsizing of current hematoma drain.   : TREY on CVVHD. Running net even. Nephro following. Cantrell removed on 5/26- Bladder scan daily.   HEME: Acute blood loss anemia on 5/12 requiring 11units of PRBC. IR negative for mesenteric extrav.   ENDO: mISS  ID: Chloe (5/21- ) Ampicillin (5/21-- ) Caspo (5/23--) ID following. Kleb bacteremia from 5/15 & 5/17, subsequent bld cx negative. PNA w/ bronch cx kleb, enterobacter (zosyn, erta resistant), E faecalis, strep angionosus from 5/19, pancreatic abscess cx pan-sensitive kleb 5/22.   PPX: SCDs, SQH.   LINES: R Ax kalpana (5/12--), RIJ TLC (5/22--), RIJ HD cath (5/22--), [facility]J TLC (5/23--)   WOUNDS/DRAINS: left venu-pancreatic abscess CODIE drain x 2, ascites CODIE x 2

## 2023-05-26 NOTE — PROGRESS NOTE ADULT - ASSESSMENT
52 yo male hx seizure disorder, aortic dissection s/p AVR, aortic graft revision 3/20/2023, second stage TEVAR 5/5/2023, course c/b peripancreatic/RP hemorrhage/hematoma formation.          #Klebsiella Bacteremia  Klebsiella BSI i/s/o VAP and pancreatic necrosis/RP hemorrhage (adjacent to aortic vascular graft). f/u surveillance bcx 5/18, blood cultures X 2 from 5/19, sputum culture 5/19. Sputum culture from 5/19 with Klebsiella pneumoniae, Enterobacter cloacae (S shin, I erta), E. faecalis and Strep anginosus.     - c/w trial of caspofungin 50mg IV daily, TPN is risk factor for candidemia  - f/u culture data  - c/w meropenem 1 g IV q8h and ampicillin 2 g IV q6h (dosed for CVVHD) plus empiric caspofungin 50mg IV q24h  - advise against any fluoroquinolones due to increased risk of aortic aneurysmal rupture  - f/u IR recommendations  - f/u GI recommendations         Team 1 will continue to follow

## 2023-05-26 NOTE — PROGRESS NOTE ADULT - SUBJECTIVE AND OBJECTIVE BOX
SUBJECTIVE: Patient seen and evaluated.        MEDICATIONS  (STANDING):  ampicillin  IVPB 2 Gram(s) IV Intermittent every 6 hours  aspirin  chewable 81 milliGRAM(s) Oral daily  caspofungin IVPB      caspofungin IVPB 50 milliGRAM(s) IV Intermittent every 24 hours  chlorhexidine 0.12% Liquid 15 milliLiter(s) Oral Mucosa every 12 hours  chlorhexidine 2% Cloths 1 Application(s) Topical daily  cisatracurium Infusion 3 MICROgram(s)/kG/Min (12.6 mL/Hr) IV Continuous <Continuous>  CRRT Treatment    <Continuous>  dextrose 5%. 1000 milliLiter(s) (50 mL/Hr) IV Continuous <Continuous>  dextrose 5%. 1000 milliLiter(s) (100 mL/Hr) IV Continuous <Continuous>  dextrose 50% Injectable 25 Gram(s) IV Push once  dextrose 50% Injectable 12.5 Gram(s) IV Push once  dextrose 50% Injectable 25 Gram(s) IV Push once  DOBUTamine Infusion 2.5 MICROgram(s)/kG/Min (2.63 mL/Hr) IV Continuous <Continuous>  fentaNYL   Infusion... 0.5 MICROgram(s)/kG/Hr (1.75 mL/Hr) IV Continuous <Continuous>  glucagon  Injectable 1 milliGRAM(s) IntraMuscular once  heparin   Injectable 5000 Unit(s) SubCutaneous every 8 hours  insulin lispro (ADMELOG) corrective regimen sliding scale   SubCutaneous every 6 hours  lacosamide IVPB 250 milliGRAM(s) IV Intermittent every 12 hours  meropenem  IVPB 1000 milliGRAM(s) IV Intermittent every 8 hours  metoclopramide Injectable 5 milliGRAM(s) IV Push every 8 hours  midazolam Infusion 0.02 mG/kG/Hr (1.4 mL/Hr) IV Continuous <Continuous>  pantoprazole  Injectable 40 milliGRAM(s) IV Push daily  Parenteral Nutrition - Adult 1 Each (70 mL/Hr) TPN Continuous <Continuous>  Parenteral Nutrition - Adult 1 Each (70 mL/Hr) TPN Continuous <Continuous>  petrolatum Ophthalmic Ointment 1 Application(s) Both EYES every 6 hours  Phoxillum Filtration BK 4 / 2.5 5000 milliLiter(s) (2000 mL/Hr) CRRT <Continuous>    MEDICATIONS  (PRN):  dextrose Oral Gel 15 Gram(s) Oral once PRN Blood Glucose LESS THAN 70 milliGRAM(s)/deciliter      Vital Signs Last 24 Hrs  T(C): 36.1 (26 May 2023 09:50), Max: 36.8 (26 May 2023 05:49)  T(F): 97 (26 May 2023 09:50), Max: 98.2 (26 May 2023 05:49)  HR: 117 (26 May 2023 09:00) (101 - 124)  BP: --  BP(mean): --  RR: 22 (26 May 2023 09:00) (16 - 22)  SpO2: 97% (26 May 2023 09:00) (94% - 100%)    Parameters below as of 26 May 2023 09:00  Patient On (Oxygen Delivery Method): ventilator    O2 Concentration (%): 50    Physical Exam:  General: Sedated and paralyzed appears anasarcic  Pulmonary: Nonlabored breathing, on Vent  Cardiovascular: Sinus tachycardia  Abdominal: soft, mild to moderated distention drain x3 dark sanguinous, 1 drain serous      I&O's Summary    25 May 2023 07:01  -  26 May 2023 07:00  --------------------------------------------------------  IN: 3900.9 mL / OUT: 5226 mL / NET: -1325.1 mL    26 May 2023 07:01  -  26 May 2023 10:55  --------------------------------------------------------  IN: 111.4 mL / OUT: 0 mL / NET: 111.4 mL        LABS:                        7.7    33.41 )-----------( 79       ( 26 May 2023 04:55 )             21.1     05-26    133<L>  |  102  |  39<H>  ----------------------------<  126<H>  4.1   |  22  |  0.86    Ca    8.2<L>      26 May 2023 04:55  Phos  3.2     05-26  Mg     2.4     05-26    TPro  4.7<L>  /  Alb  2.5<L>  /  TBili  7.6<H>  /  DBili  See Note  /  AST  33  /  ALT  15  /  AlkPhos  98  05-26    PT/INR - ( 26 May 2023 06:35 )   PT: 13.9 sec;   INR: 1.17          PTT - ( 26 May 2023 06:35 )  PTT:30.7 sec    CAPILLARY BLOOD GLUCOSE      POCT Blood Glucose.: 132 mg/dL (26 May 2023 01:01)    LIVER FUNCTIONS - ( 26 May 2023 04:55 )  Alb: 2.5 g/dL / Pro: 4.7 g/dL / ALK PHOS: 98 U/L / ALT: 15 U/L / AST: 33 U/L / GGT: x             RADIOLOGY & ADDITIONAL STUDIES:       SUBJECTIVE: Patient seen and evaluated. Remains intubated and sedated        MEDICATIONS  (STANDING):  ampicillin  IVPB 2 Gram(s) IV Intermittent every 6 hours  aspirin  chewable 81 milliGRAM(s) Oral daily  caspofungin IVPB      caspofungin IVPB 50 milliGRAM(s) IV Intermittent every 24 hours  chlorhexidine 0.12% Liquid 15 milliLiter(s) Oral Mucosa every 12 hours  chlorhexidine 2% Cloths 1 Application(s) Topical daily  cisatracurium Infusion 3 MICROgram(s)/kG/Min (12.6 mL/Hr) IV Continuous <Continuous>  CRRT Treatment    <Continuous>  dextrose 5%. 1000 milliLiter(s) (50 mL/Hr) IV Continuous <Continuous>  dextrose 5%. 1000 milliLiter(s) (100 mL/Hr) IV Continuous <Continuous>  dextrose 50% Injectable 25 Gram(s) IV Push once  dextrose 50% Injectable 12.5 Gram(s) IV Push once  dextrose 50% Injectable 25 Gram(s) IV Push once  DOBUTamine Infusion 2.5 MICROgram(s)/kG/Min (2.63 mL/Hr) IV Continuous <Continuous>  fentaNYL   Infusion... 0.5 MICROgram(s)/kG/Hr (1.75 mL/Hr) IV Continuous <Continuous>  glucagon  Injectable 1 milliGRAM(s) IntraMuscular once  heparin   Injectable 5000 Unit(s) SubCutaneous every 8 hours  insulin lispro (ADMELOG) corrective regimen sliding scale   SubCutaneous every 6 hours  lacosamide IVPB 250 milliGRAM(s) IV Intermittent every 12 hours  meropenem  IVPB 1000 milliGRAM(s) IV Intermittent every 8 hours  metoclopramide Injectable 5 milliGRAM(s) IV Push every 8 hours  midazolam Infusion 0.02 mG/kG/Hr (1.4 mL/Hr) IV Continuous <Continuous>  pantoprazole  Injectable 40 milliGRAM(s) IV Push daily  Parenteral Nutrition - Adult 1 Each (70 mL/Hr) TPN Continuous <Continuous>  Parenteral Nutrition - Adult 1 Each (70 mL/Hr) TPN Continuous <Continuous>  petrolatum Ophthalmic Ointment 1 Application(s) Both EYES every 6 hours  Phoxillum Filtration BK 4 / 2.5 5000 milliLiter(s) (2000 mL/Hr) CRRT <Continuous>    MEDICATIONS  (PRN):  dextrose Oral Gel 15 Gram(s) Oral once PRN Blood Glucose LESS THAN 70 milliGRAM(s)/deciliter      Vital Signs Last 24 Hrs  T(C): 36.1 (26 May 2023 09:50), Max: 36.8 (26 May 2023 05:49)  T(F): 97 (26 May 2023 09:50), Max: 98.2 (26 May 2023 05:49)  HR: 117 (26 May 2023 09:00) (101 - 124)  BP: --  BP(mean): --  RR: 22 (26 May 2023 09:00) (16 - 22)  SpO2: 97% (26 May 2023 09:00) (94% - 100%)    Parameters below as of 26 May 2023 09:00  Patient On (Oxygen Delivery Method): ventilator    O2 Concentration (%): 50    Physical Exam:  General: Sedated and paralyzed appears anasarcic  HEENT: NC/AC, ETT in place with OGT  Pulmonary: Nonlabored breathing, Decreased breath sounds Left lung base on Vent:   Mode: AC/ CMV (Assist Control/ Continuous Mandatory Ventilation)  RR (machine): 22  TV (machine): 500  FiO2: 40  PEEP: 8  ITime: 1  MAP: 15  PIP: 26  Cardiovascular: Sinus tachycardia, S1 S2 RRR  Abdominal: soft, mild to moderated distention drain x3 dark sanguinous, 1 drain serous  Extremities: cool, edematous        I&O's Summary    25 May 2023 07:01  -  26 May 2023 07:00  --------------------------------------------------------  IN: 3900.9 mL / OUT: 5226 mL / NET: -1325.1 mL    26 May 2023 07:01  -  26 May 2023 10:55  --------------------------------------------------------  IN: 111.4 mL / OUT: 0 mL / NET: 111.4 mL        LABS:                        7.7    33.41 )-----------( 79       ( 26 May 2023 04:55 )             21.1     05-26    133<L>  |  102  |  39<H>  ----------------------------<  126<H>  4.1   |  22  |  0.86    Ca    8.2<L>      26 May 2023 04:55  Phos  3.2     05-26  Mg     2.4     05-26    TPro  4.7<L>  /  Alb  2.5<L>  /  TBili  7.6<H>  /  DBili  See Note  /  AST  33  /  ALT  15  /  AlkPhos  98  05-26    PT/INR - ( 26 May 2023 06:35 )   PT: 13.9 sec;   INR: 1.17          PTT - ( 26 May 2023 06:35 )  PTT:30.7 sec    CAPILLARY BLOOD GLUCOSE      POCT Blood Glucose.: 132 mg/dL (26 May 2023 01:01)    LIVER FUNCTIONS - ( 26 May 2023 04:55 )  Alb: 2.5 g/dL / Pro: 4.7 g/dL / ALK PHOS: 98 U/L / ALT: 15 U/L / AST: 33 U/L / GGT: x             RADIOLOGY & ADDITIONAL STUDIES:       SUBJECTIVE: Patient seen and evaluated. Remains intubated and sedated  ROS not obtainable      MEDICATIONS  (STANDING):  ampicillin  IVPB 2 Gram(s) IV Intermittent every 6 hours  aspirin  chewable 81 milliGRAM(s) Oral daily  caspofungin IVPB      caspofungin IVPB 50 milliGRAM(s) IV Intermittent every 24 hours  chlorhexidine 0.12% Liquid 15 milliLiter(s) Oral Mucosa every 12 hours  chlorhexidine 2% Cloths 1 Application(s) Topical daily  cisatracurium Infusion 3 MICROgram(s)/kG/Min (12.6 mL/Hr) IV Continuous <Continuous>  CRRT Treatment    <Continuous>  dextrose 5%. 1000 milliLiter(s) (50 mL/Hr) IV Continuous <Continuous>  dextrose 5%. 1000 milliLiter(s) (100 mL/Hr) IV Continuous <Continuous>  dextrose 50% Injectable 25 Gram(s) IV Push once  dextrose 50% Injectable 12.5 Gram(s) IV Push once  dextrose 50% Injectable 25 Gram(s) IV Push once  DOBUTamine Infusion 2.5 MICROgram(s)/kG/Min (2.63 mL/Hr) IV Continuous <Continuous>  fentaNYL   Infusion... 0.5 MICROgram(s)/kG/Hr (1.75 mL/Hr) IV Continuous <Continuous>  glucagon  Injectable 1 milliGRAM(s) IntraMuscular once  heparin   Injectable 5000 Unit(s) SubCutaneous every 8 hours  insulin lispro (ADMELOG) corrective regimen sliding scale   SubCutaneous every 6 hours  lacosamide IVPB 250 milliGRAM(s) IV Intermittent every 12 hours  meropenem  IVPB 1000 milliGRAM(s) IV Intermittent every 8 hours  metoclopramide Injectable 5 milliGRAM(s) IV Push every 8 hours  midazolam Infusion 0.02 mG/kG/Hr (1.4 mL/Hr) IV Continuous <Continuous>  pantoprazole  Injectable 40 milliGRAM(s) IV Push daily  Parenteral Nutrition - Adult 1 Each (70 mL/Hr) TPN Continuous <Continuous>  Parenteral Nutrition - Adult 1 Each (70 mL/Hr) TPN Continuous <Continuous>  petrolatum Ophthalmic Ointment 1 Application(s) Both EYES every 6 hours  Phoxillum Filtration BK 4 / 2.5 5000 milliLiter(s) (2000 mL/Hr) CRRT <Continuous>    MEDICATIONS  (PRN):  dextrose Oral Gel 15 Gram(s) Oral once PRN Blood Glucose LESS THAN 70 milliGRAM(s)/deciliter      Vital Signs Last 24 Hrs  T(C): 36.1 (26 May 2023 09:50), Max: 36.8 (26 May 2023 05:49)  T(F): 97 (26 May 2023 09:50), Max: 98.2 (26 May 2023 05:49)  HR: 117 (26 May 2023 09:00) (101 - 124)  BP: --  BP(mean): --  RR: 22 (26 May 2023 09:00) (16 - 22)  SpO2: 97% (26 May 2023 09:00) (94% - 100%)    Parameters below as of 26 May 2023 09:00  Patient On (Oxygen Delivery Method): ventilator    O2 Concentration (%): 50    Physical Exam:  General: Sedated and paralyzed appears anasarcic  HEENT: NC/AC, ETT in place with OGT  Pulmonary: Nonlabored breathing, Decreased breath sounds Left lung base on Vent:   Mode: AC/ CMV (Assist Control/ Continuous Mandatory Ventilation)  RR (machine): 22  TV (machine): 500  FiO2: 40  PEEP: 8  ITime: 1  MAP: 15  PIP: 26  Cardiovascular: Sinus tachycardia, S1 S2 RRR  Abdominal: soft, mild to moderated distention drain x3 dark sanguinous, 1 drain serous  Extremities: cool, edematous  Skin: no rash  : yfn in place        I&O's Summary    25 May 2023 07:01  -  26 May 2023 07:00  --------------------------------------------------------  IN: 3900.9 mL / OUT: 5226 mL / NET: -1325.1 mL    26 May 2023 07:01  -  26 May 2023 10:55  --------------------------------------------------------  IN: 111.4 mL / OUT: 0 mL / NET: 111.4 mL        LABS:                        7.7    33.41 )-----------( 79       ( 26 May 2023 04:55 )             21.1     05-26    133<L>  |  102  |  39<H>  ----------------------------<  126<H>  4.1   |  22  |  0.86    Ca    8.2<L>      26 May 2023 04:55  Phos  3.2     05-26  Mg     2.4     05-26    TPro  4.7<L>  /  Alb  2.5<L>  /  TBili  7.6<H>  /  DBili  See Note  /  AST  33  /  ALT  15  /  AlkPhos  98  05-26    PT/INR - ( 26 May 2023 06:35 )   PT: 13.9 sec;   INR: 1.17          PTT - ( 26 May 2023 06:35 )  PTT:30.7 sec    CAPILLARY BLOOD GLUCOSE      POCT Blood Glucose.: 132 mg/dL (26 May 2023 01:01)    LIVER FUNCTIONS - ( 26 May 2023 04:55 )  Alb: 2.5 g/dL / Pro: 4.7 g/dL / ALK PHOS: 98 U/L / ALT: 15 U/L / AST: 33 U/L / GGT: x             RADIOLOGY & ADDITIONAL STUDIES:

## 2023-05-26 NOTE — PROGRESS NOTE ADULT - ASSESSMENT
This is a 52yo Male pt with PMH HTN, type A aortic dissection s/p Dacron grafts, AV resuspension in 2013, CAD s/p CABG x 1 SVG to RCA (5/2013 with Dr. Medina), seizure disorder (last episode on 7/4/22) S/P replacement of transverse aortic arch, second stage thoracic endovascular aortic repair, aorto-axillary bypass, AV replacement (bio 23mm), in APr 2023 and CABG x 1 (SVG-RCA) EF 75% (Ignacio, 3/20) now s/p 2nd state TEVAR on 5/5 for whom surgery was consulted for finding of acute pancreatitis with large peripancreatic/perigastric fluid collections on CTA abdomen 5/8 then acute hemorrhage starting 5/12/23 requiring 11 U pRBC but with negative mesenteric angiogram 5/13/23 for whom hepatobiliary surgery was reconsulted for possible hemorrhagic pancreatitis, now with 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     - Repeat CT Scan today  - Appreciate IR recommendations for possible reintervention after CT scan as above.  - Would keep NPO with TPN support given bilious NGT output.  - Trend H&H regularly  - CVVH as per SICU  - ABx: ampicillin, meropenem, caspofungin.   - Appreciate excellent care per primary team. Hepatobiliary surgery (Team 1C) continuing to follow.

## 2023-05-26 NOTE — PROGRESS NOTE ADULT - SUBJECTIVE AND OBJECTIVE BOX
IDENTIFICATION: This is a 52yo Male pt with PMH HTN, type A aortic dissection s/p Dacron grafts, AV resuspension in 2013, CAD s/p CABG x 1 SVG to RCA (5/2013 with Dr. Medina), seizure disorder (last episode on 7/4/22) S/P replacement of transverse aortic arch, second stage thoracic endovascular aortic repair, aorto-axillary bypass, AV replacement (bio 23mm), in APr 2023 and CABG x 1 (SVG-RCA) EF 75% (Ignacio, 3/20) now s/p 2nd state TEVAR on 5/5 for whom surgery was consulted for finding of acute pancreatitis with large peripancreatic/perigastric fluid collections on CTA abdomen 5/8 then acute hemorrhage starting 5/12/23 requiring 11 U pRBC but with negative mesenteric angiogram 5/13/23 for whom hepatobiliary surgery was reconsulted for possible hemorrhagic pancreatitis, now with 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     EVENTS:   - Remains intubated/sedated  - H&H stable.  - WBC downtrending after IR placed 3 new drains yesterday.   - IR Cultures grossly positive for Klebsiella    SUBJECTIVE:   - Sedated    MEDICATIONS  (STANDING):  ampicillin  IVPB 2 Gram(s) IV Intermittent every 6 hours  aspirin  chewable 81 milliGRAM(s) Oral daily  caspofungin IVPB      caspofungin IVPB 50 milliGRAM(s) IV Intermittent every 24 hours  chlorhexidine 0.12% Liquid 15 milliLiter(s) Oral Mucosa every 12 hours  chlorhexidine 2% Cloths 1 Application(s) Topical daily  cisatracurium Infusion 3 MICROgram(s)/kG/Min (12.6 mL/Hr) IV Continuous <Continuous>  CRRT Treatment    <Continuous>  dextrose 5%. 1000 milliLiter(s) (50 mL/Hr) IV Continuous <Continuous>  dextrose 5%. 1000 milliLiter(s) (100 mL/Hr) IV Continuous <Continuous>  dextrose 50% Injectable 25 Gram(s) IV Push once  dextrose 50% Injectable 12.5 Gram(s) IV Push once  dextrose 50% Injectable 25 Gram(s) IV Push once  DOBUTamine Infusion 2.5 MICROgram(s)/kG/Min (2.63 mL/Hr) IV Continuous <Continuous>  fentaNYL   Infusion... 0.5 MICROgram(s)/kG/Hr (1.75 mL/Hr) IV Continuous <Continuous>  glucagon  Injectable 1 milliGRAM(s) IntraMuscular once  heparin   Injectable 5000 Unit(s) SubCutaneous every 8 hours  insulin lispro (ADMELOG) corrective regimen sliding scale   SubCutaneous every 6 hours  lacosamide IVPB 250 milliGRAM(s) IV Intermittent every 12 hours  meropenem  IVPB 1000 milliGRAM(s) IV Intermittent every 8 hours  metoclopramide Injectable 5 milliGRAM(s) IV Push every 8 hours  midazolam Infusion 0.02 mG/kG/Hr (1.4 mL/Hr) IV Continuous <Continuous>  pantoprazole  Injectable 40 milliGRAM(s) IV Push daily  Parenteral Nutrition - Adult 1 Each (70 mL/Hr) TPN Continuous <Continuous>  Parenteral Nutrition - Adult 1 Each (70 mL/Hr) TPN Continuous <Continuous>  petrolatum Ophthalmic Ointment 1 Application(s) Both EYES every 6 hours  Phoxillum Filtration BK 4 / 2.5 5000 milliLiter(s) (2000 mL/Hr) CRRT <Continuous>    MEDICATIONS  (PRN):  dextrose Oral Gel 15 Gram(s) Oral once PRN Blood Glucose LESS THAN 70 milliGRAM(s)/deciliter      Vital Signs Last 24 Hrs  T(C): 36.1 (26 May 2023 09:50), Max: 36.8 (26 May 2023 05:49)  T(F): 97 (26 May 2023 09:50), Max: 98.2 (26 May 2023 05:49)  HR: 117 (26 May 2023 09:00) (101 - 124)  BP: --  BP(mean): --  RR: 22 (26 May 2023 09:00) (16 - 22)  SpO2: 97% (26 May 2023 09:00) (94% - 100%)    Parameters below as of 26 May 2023 09:00  Patient On (Oxygen Delivery Method): ventilator    O2 Concentration (%): 50    Neurologic: Sedated  CV: Normal rate, regular rhythm  Pulm: Breathing comfortably on MV with equal chest rise.  Abd: Moderately distended; mild reaction to palpation despite sedation  Ascites drains: mildly thick brownish-red fluid  Hematoma drain: thin reddish-black fluid  Pancreatic drain: thin reddish-black fluid  : Cantrell with reddish fluid.  Skin: No rashes  Extremities: No edema.  Psychiatric: Sedated    I&O's Detail    25 May 2023 07:01  -  26 May 2023 07:00  --------------------------------------------------------  IN:    Albumin 25%  -  50 mL: 150 mL    Cisatracurium: 474.7 mL    DOBUTamine: 78 mL    DOBUTamine: 31.6 mL    DOBUTamine: 2.7 mL    DOBUTamine: 79.4 mL    FentaNYL: 10.5 mL    FentaNYL: 44.4 mL    IV PiggyBack: 100 mL    IV PiggyBack: 350 mL    IV PiggyBack: 200 mL    Midazolam: 14 mL    Midazolam: 61.6 mL    PRBCs (Packed Red Blood Cells): 300 mL    sodium chloride 0.9%: 276 mL    TPN (Total Parenteral Nutrition): 1680 mL    Vasopressin: 48 mL  Total IN: 3900.9 mL    OUT:    Drain (mL): 30 mL    Drain (mL): 255 mL    Drain (mL): 370 mL    Drain (mL): 195 mL    Indwelling Catheter - Urethral (mL): 63 mL    Nasogastric/Oral tube (mL): 575 mL    Other (mL): 1700 mL    Other (mL): 2038 mL  Total OUT: 5226 mL    Total NET: -1325.1 mL      26 May 2023 07:01  -  26 May 2023 10:37  --------------------------------------------------------  IN:    Cisatracurium: 21 mL    DOBUTamine: 2.7 mL    FentaNYL: 3.5 mL    Midazolam: 4.2 mL    sodium chloride 0.9%: 10 mL    TPN (Total Parenteral Nutrition): 70 mL  Total IN: 111.4 mL    OUT:    Indwelling Catheter - Urethral (mL): 0 mL  Total OUT: 0 mL    Total NET: 111.4 mL          LABS:                        7.7    33.41 )-----------( 79       ( 26 May 2023 04:55 )             21.1     05-26    133<L>  |  102  |  39<H>  ----------------------------<  126<H>  4.1   |  22  |  0.86    Ca    8.2<L>      26 May 2023 04:55  Phos  3.2     05-26  Mg     2.4     05-26    TPro  4.7<L>  /  Alb  2.5<L>  /  TBili  7.6<H>  /  DBili  See Note  /  AST  33  /  ALT  15  /  AlkPhos  98  05-26    PT/INR - ( 26 May 2023 06:35 )   PT: 13.9 sec;   INR: 1.17          PTT - ( 26 May 2023 06:35 )  PTT:30.7 sec

## 2023-05-26 NOTE — PROCEDURE NOTE - NSBRONCHPROCDETAILS_GEN_A_CORE_FT
Patient already intubated and sedated in SICU. Pre procedure hyperoxygenated to fio2 100%.   Ambu scope inserted into ETT via adaptor. Airway mucosa appeared normal. Mucous plugs noted in LLL. Therapeutic suctioning performed. Significant amount of thick secretions suctioned out from LLL airways. LLL appeared extrinsically compressed post removal of mucous plugs. Scope removed. No immediate complications post procedure. Bronchial wash sent for analysis (cell count/diff and cultures).

## 2023-05-26 NOTE — PROGRESS NOTE ADULT - SUBJECTIVE AND OBJECTIVE BOX
52yo Male pt with PMH HTN, type A aortic dissection s/p Dacron grafts, AV resuspension in 2013, CAD s/p CABG x 1 SVG to RCA (5/2013 with Dr. Medina), seizure disorder (last episode on 7/4/22) S/P replacement of transverse aortic arch, second stage thoracic endovascular aortic repair, aorto-axillary bypass, AV replacement (bio 23mm), in APr 2023 and CABG x 1 (SVG-RCA) EF 75% ([place], 3/20) now s/p 2nd state TEVAR on 5/5 for whom surgery was consulted for finding of acute pancreatitis with large peripancreatic/perigastric fluid collections on CTA abdomen 5/8 then acute hemorrhage starting 5/12/23 requiring 11 U pRBC over last 48 hours but with negative mesenteric angiogram 5/13/23. S/p paracentesis and LUQ collection aspiration (no drain per surgery) on 5/22/23. LUQ collection growing Klebsiella pneumoniae.    5/24/23:  LUQ drain placement by IR.     5/25/23:  LUQ drain upsizing. Placement of two additional 14 F drainage catheters in the left mid and lower abdomen. Placement of a left abdominal hematoma drain.     5/26/23:   Placement of a right ascites drain and right pelvic hematoma/abscess drain by IR.     Left pancreatic collection drain: 255cc serosanguinous output over the past 24hrs, 315cc of output the day prior  Left abdominal hematoma drain: 195cc mostly sanguinous output over the past 24hrs  Mid abdominal Ascites drain: 30cc serosanguinous output over the past 24hrs  Lower abdominal ascites drain: 370cc serosanguinous output over the past 24hrs    All drains flush easily with sterile NS.     Left pancreatic collection and left abdominal hematoma drain dressings saturated with blood this AM. Changed dressing and reinforced drain site with hemostatic gauze.     Continue to monitor drain output.

## 2023-05-26 NOTE — PROGRESS NOTE ADULT - ASSESSMENT
53 YO Male, HTN aortic dissection previous admission with severe cardiogenic shock TREY requiring CVVHD presented o the ED with worsening epigastric pain CTA/P revealed continued endoleak hospital course complicated by necrotic pancreatitis now with non-oliguric TREY    #non-oliguric TREY  #necrotizing pancreatitis    recommend:  to c/w CVVHD for UF and clearance   will c/w phoxillum as phos low-normal  Qb 300ml/min DFR 2L/hour UF net negative 150ml/hour   q8H BMP phos while on CVVHD  maintain MAP > 70 for adequate renal perfusion  strict i's and o's  renally dose abx egfr <15      Malika Garcia D.O  PGY 5 nephrology fellow  [phone]  53 YO Male, HTN aortic dissection previous admission with severe cardiogenic shock TREY requiring CVVHD presented o the ED with worsening epigastric pain CTA/P revealed continued endoleak hospital course complicated by necrotic pancreatitis now with oliguric TREY    #oliguric TREY  #necrotizing pancreatitis    recommend:  to c/w CVVHD for UF and clearance   will c/w phoxillum as phos low-normal  Qb 300ml/min DFR 2L/hour UF net negative 150ml/hour   q8H BMP phos while on CVVHD  maintain MAP > 70 for adequate renal perfusion  strict i's and o's  renally dose abx egfr <15      Malika aGrcia D.O  PGY 5 nephrology fellow  [phone]

## 2023-05-26 NOTE — PROGRESS NOTE ADULT - ATTENDING COMMENTS
I agree with the fellow's findings and plans as written above with the following additions/amendments:    Seen and examined at bedside on CVVHD, tolerating well, off pressors, continue CVVHD as above, further recs as above

## 2023-05-26 NOTE — PROGRESS NOTE ADULT - ATTENDING COMMENTS
WBC continues to decrease and he is afebrile.  Multiple cultures now with klebsiella only. If it remains that way can likely d/c ampicillin on 5/27.  Continue Amp, Meropenem and Caspofungin for now

## 2023-05-27 LAB
-  AMPICILLIN/SULBACTAM: SIGNIFICANT CHANGE UP
-  AMPICILLIN: SIGNIFICANT CHANGE UP
-  CEFAZOLIN: SIGNIFICANT CHANGE UP
-  CEFTRIAXONE: SIGNIFICANT CHANGE UP
-  CIPROFLOXACIN: SIGNIFICANT CHANGE UP
-  ERTAPENEM: SIGNIFICANT CHANGE UP
-  GENTAMICIN: SIGNIFICANT CHANGE UP
-  PIPERACILLIN/TAZOBACTAM: SIGNIFICANT CHANGE UP
-  TOBRAMYCIN: SIGNIFICANT CHANGE UP
-  TRIMETHOPRIM/SULFAMETHOXAZOLE: SIGNIFICANT CHANGE UP
ALBUMIN SERPL ELPH-MCNC: 2.4 G/DL — LOW (ref 3.3–5)
ALP SERPL-CCNC: 222 U/L — HIGH (ref 40–120)
ALT FLD-CCNC: 15 U/L — SIGNIFICANT CHANGE UP (ref 10–45)
ANION GAP SERPL CALC-SCNC: 11 MMOL/L — SIGNIFICANT CHANGE UP (ref 5–17)
ANION GAP SERPL CALC-SCNC: 8 MMOL/L — SIGNIFICANT CHANGE UP (ref 5–17)
ANION GAP SERPL CALC-SCNC: 9 MMOL/L — SIGNIFICANT CHANGE UP (ref 5–17)
APTT BLD: 30.6 SEC — SIGNIFICANT CHANGE UP (ref 27.5–35.5)
AST SERPL-CCNC: 33 U/L — SIGNIFICANT CHANGE UP (ref 10–40)
BASE EXCESS BLDA CALC-SCNC: -1.5 MMOL/L — SIGNIFICANT CHANGE UP (ref -2–3)
BASE EXCESS BLDV CALC-SCNC: -2.2 MMOL/L — LOW (ref -2–3)
BILIRUB DIRECT SERPL-MCNC: 6.1 MG/DL — HIGH (ref 0–0.3)
BILIRUB INDIRECT FLD-MCNC: 0.9 MG/DL — SIGNIFICANT CHANGE UP (ref 0.2–1)
BILIRUB SERPL-MCNC: 7 MG/DL — HIGH (ref 0.2–1.2)
BUN SERPL-MCNC: 35 MG/DL — HIGH (ref 7–23)
BUN SERPL-MCNC: 37 MG/DL — HIGH (ref 7–23)
BUN SERPL-MCNC: 42 MG/DL — HIGH (ref 7–23)
CA-I SERPL-SCNC: 1.24 MMOL/L — SIGNIFICANT CHANGE UP (ref 1.15–1.33)
CALCIUM SERPL-MCNC: 8.1 MG/DL — LOW (ref 8.4–10.5)
CALCIUM SERPL-MCNC: 8.1 MG/DL — LOW (ref 8.4–10.5)
CALCIUM SERPL-MCNC: 8.2 MG/DL — LOW (ref 8.4–10.5)
CHLORIDE SERPL-SCNC: 103 MMOL/L — SIGNIFICANT CHANGE UP (ref 96–108)
CHLORIDE SERPL-SCNC: 103 MMOL/L — SIGNIFICANT CHANGE UP (ref 96–108)
CK SERPL-CCNC: 42 U/L — SIGNIFICANT CHANGE UP (ref 30–200)
CO2 BLDA-SCNC: 24 MMOL/L — SIGNIFICANT CHANGE UP (ref 19–24)
CO2 BLDV-SCNC: 25.1 MMOL/L — SIGNIFICANT CHANGE UP (ref 22–26)
CO2 SERPL-SCNC: 21 MMOL/L — LOW (ref 22–31)
CO2 SERPL-SCNC: 22 MMOL/L — SIGNIFICANT CHANGE UP (ref 22–31)
CREAT SERPL-MCNC: 0.87 MG/DL — SIGNIFICANT CHANGE UP (ref 0.5–1.3)
CREAT SERPL-MCNC: 0.9 MG/DL — SIGNIFICANT CHANGE UP (ref 0.5–1.3)
CULTURE RESULTS: NO GROWTH — SIGNIFICANT CHANGE UP
CULTURE RESULTS: SIGNIFICANT CHANGE UP
EGFR: 101 ML/MIN/1.73M2 — SIGNIFICANT CHANGE UP
EGFR: 103 ML/MIN/1.73M2 — SIGNIFICANT CHANGE UP
GAS PNL BLDV: 133 MMOL/L — LOW (ref 136–145)
GAS PNL BLDV: SIGNIFICANT CHANGE UP
GLUCOSE BLDC GLUCOMTR-MCNC: 122 MG/DL — HIGH (ref 70–99)
GLUCOSE BLDC GLUCOMTR-MCNC: 98 MG/DL — SIGNIFICANT CHANGE UP (ref 70–99)
GLUCOSE SERPL-MCNC: 124 MG/DL — HIGH (ref 70–99)
GLUCOSE SERPL-MCNC: 133 MG/DL — HIGH (ref 70–99)
HCO3 BLDA-SCNC: 23 MMOL/L — SIGNIFICANT CHANGE UP (ref 21–28)
HCO3 BLDV-SCNC: 24 MMOL/L — SIGNIFICANT CHANGE UP (ref 22–29)
HCT VFR BLD CALC: 21.8 % — LOW (ref 39–50)
HCT VFR BLD CALC: 22.1 % — LOW (ref 39–50)
HGB BLD-MCNC: 7.7 G/DL — LOW (ref 13–17)
HGB BLD-MCNC: 7.8 G/DL — LOW (ref 13–17)
INR BLD: 1.19 — HIGH (ref 0.88–1.16)
LACTATE SERPL-SCNC: 1.2 MMOL/L — SIGNIFICANT CHANGE UP (ref 0.5–2)
MAGNESIUM SERPL-MCNC: 2.4 MG/DL — SIGNIFICANT CHANGE UP (ref 1.6–2.6)
MAGNESIUM SERPL-MCNC: 2.5 MG/DL — SIGNIFICANT CHANGE UP (ref 1.6–2.6)
MCHC RBC-ENTMCNC: 31.3 PG — SIGNIFICANT CHANGE UP (ref 27–34)
MCHC RBC-ENTMCNC: 31.6 PG — SIGNIFICANT CHANGE UP (ref 27–34)
MCHC RBC-ENTMCNC: 35.3 GM/DL — SIGNIFICANT CHANGE UP (ref 32–36)
MCHC RBC-ENTMCNC: 35.3 GM/DL — SIGNIFICANT CHANGE UP (ref 32–36)
MCV RBC AUTO: 88.8 FL — SIGNIFICANT CHANGE UP (ref 80–100)
MCV RBC AUTO: 89.3 FL — SIGNIFICANT CHANGE UP (ref 80–100)
METHOD TYPE: SIGNIFICANT CHANGE UP
METHOD TYPE: SIGNIFICANT CHANGE UP
NRBC # BLD: 0 /100 WBCS — SIGNIFICANT CHANGE UP (ref 0–0)
NRBC # BLD: 0 /100 WBCS — SIGNIFICANT CHANGE UP (ref 0–0)
ORGANISM # SPEC MICROSCOPIC CNT: SIGNIFICANT CHANGE UP
PCO2 BLDA: 37 MMHG — SIGNIFICANT CHANGE UP (ref 35–48)
PCO2 BLDV: 44 MMHG — SIGNIFICANT CHANGE UP (ref 42–55)
PH BLDA: 7.4 — SIGNIFICANT CHANGE UP (ref 7.35–7.45)
PH BLDV: 7.34 — SIGNIFICANT CHANGE UP (ref 7.32–7.43)
PHOSPHATE SERPL-MCNC: 3.6 MG/DL — SIGNIFICANT CHANGE UP (ref 2.5–4.5)
PHOSPHATE SERPL-MCNC: 3.7 MG/DL — SIGNIFICANT CHANGE UP (ref 2.5–4.5)
PLATELET # BLD AUTO: 69 K/UL — LOW (ref 150–400)
PLATELET # BLD AUTO: 74 K/UL — LOW (ref 150–400)
PO2 BLDA: 158 MMHG — HIGH (ref 83–108)
PO2 BLDV: 41 MMHG — SIGNIFICANT CHANGE UP (ref 25–45)
POTASSIUM BLDV-SCNC: 4.3 MMOL/L — SIGNIFICANT CHANGE UP (ref 3.5–5.1)
POTASSIUM SERPL-MCNC: 4.1 MMOL/L — SIGNIFICANT CHANGE UP (ref 3.5–5.3)
POTASSIUM SERPL-MCNC: 4.3 MMOL/L — SIGNIFICANT CHANGE UP (ref 3.5–5.3)
POTASSIUM SERPL-SCNC: 4.1 MMOL/L — SIGNIFICANT CHANGE UP (ref 3.5–5.3)
POTASSIUM SERPL-SCNC: 4.3 MMOL/L — SIGNIFICANT CHANGE UP (ref 3.5–5.3)
PROT SERPL-MCNC: 4.8 G/DL — LOW (ref 6–8.3)
PROTHROM AB SERPL-ACNC: 14.2 SEC — HIGH (ref 10.5–13.4)
RBC # BLD: 2.44 M/UL — LOW (ref 4.2–5.8)
RBC # BLD: 2.49 M/UL — LOW (ref 4.2–5.8)
RBC # FLD: 16.3 % — HIGH (ref 10.3–14.5)
RBC # FLD: 16.6 % — HIGH (ref 10.3–14.5)
SAO2 % BLDA: 99.8 % — HIGH (ref 94–98)
SAO2 % BLDV: 71.9 % — SIGNIFICANT CHANGE UP (ref 67–88)
SODIUM SERPL-SCNC: 133 MMOL/L — LOW (ref 135–145)
SODIUM SERPL-SCNC: 134 MMOL/L — LOW (ref 135–145)
SODIUM SERPL-SCNC: 135 MMOL/L — SIGNIFICANT CHANGE UP (ref 135–145)
SPECIMEN SOURCE: SIGNIFICANT CHANGE UP
SPECIMEN SOURCE: SIGNIFICANT CHANGE UP
WBC # BLD: 27.01 K/UL — HIGH (ref 3.8–10.5)
WBC # BLD: 29.04 K/UL — HIGH (ref 3.8–10.5)
WBC # FLD AUTO: 27.01 K/UL — HIGH (ref 3.8–10.5)
WBC # FLD AUTO: 29.04 K/UL — HIGH (ref 3.8–10.5)

## 2023-05-27 PROCEDURE — 99231 SBSQ HOSP IP/OBS SF/LOW 25: CPT

## 2023-05-27 RX ORDER — I.V. FAT EMULSION 20 G/100ML
0.7 EMULSION INTRAVENOUS
Qty: 50 | Refills: 0 | Status: DISCONTINUED | OUTPATIENT
Start: 2023-05-27 | End: 2023-05-27

## 2023-05-27 RX ORDER — DEXMEDETOMIDINE HYDROCHLORIDE IN 0.9% SODIUM CHLORIDE 4 UG/ML
0.2 INJECTION INTRAVENOUS
Qty: 400 | Refills: 0 | Status: DISCONTINUED | OUTPATIENT
Start: 2023-05-27 | End: 2023-05-31

## 2023-05-27 RX ORDER — ELECTROLYTE SOLUTION,INJ
1 VIAL (ML) INTRAVENOUS
Refills: 0 | Status: DISCONTINUED | OUTPATIENT
Start: 2023-05-27 | End: 2023-05-27

## 2023-05-27 RX ORDER — ACETAMINOPHEN 500 MG
1000 TABLET ORAL ONCE
Refills: 0 | Status: COMPLETED | OUTPATIENT
Start: 2023-05-27 | End: 2023-05-27

## 2023-05-27 RX ORDER — PANTOPRAZOLE SODIUM 20 MG/1
40 TABLET, DELAYED RELEASE ORAL
Refills: 0 | Status: DISCONTINUED | OUTPATIENT
Start: 2023-05-27 | End: 2023-05-31

## 2023-05-27 RX ORDER — NOREPINEPHRINE BITARTRATE/D5W 8 MG/250ML
0.05 PLASTIC BAG, INJECTION (ML) INTRAVENOUS
Qty: 16 | Refills: 0 | Status: DISCONTINUED | OUTPATIENT
Start: 2023-05-27 | End: 2023-05-31

## 2023-05-27 RX ADMIN — CHLORHEXIDINE GLUCONATE 1 APPLICATION(S): 213 SOLUTION TOPICAL at 06:05

## 2023-05-27 RX ADMIN — Medication 3.28 MICROGRAM(S)/KG/MIN: at 18:01

## 2023-05-27 RX ADMIN — Medication 3 MILLILITER(S): at 05:57

## 2023-05-27 RX ADMIN — Medication 9.19 MICROGRAM(S)/KG/MIN: at 00:10

## 2023-05-27 RX ADMIN — FENTANYL CITRATE 1.75 MICROGRAM(S)/KG/HR: 50 INJECTION INTRAVENOUS at 06:04

## 2023-05-27 RX ADMIN — Medication 3 MILLILITER(S): at 10:50

## 2023-05-27 RX ADMIN — Medication 1 APPLICATION(S): at 15:12

## 2023-05-27 RX ADMIN — I.V. FAT EMULSION 20.83 GM/KG/DAY: 20 EMULSION INTRAVENOUS at 19:22

## 2023-05-27 RX ADMIN — Medication 216 GRAM(S): at 12:54

## 2023-05-27 RX ADMIN — PROPOFOL 4.2 MICROGRAM(S)/KG/MIN: 10 INJECTION, EMULSION INTRAVENOUS at 06:43

## 2023-05-27 RX ADMIN — Medication 1000 MILLIGRAM(S): at 16:00

## 2023-05-27 RX ADMIN — Medication 400 MILLIGRAM(S): at 15:11

## 2023-05-27 RX ADMIN — Medication 216 GRAM(S): at 06:03

## 2023-05-27 RX ADMIN — MEROPENEM 100 MILLIGRAM(S): 1 INJECTION INTRAVENOUS at 06:03

## 2023-05-27 RX ADMIN — CASPOFUNGIN ACETATE 260 MILLIGRAM(S): 7 INJECTION, POWDER, LYOPHILIZED, FOR SOLUTION INTRAVENOUS at 10:26

## 2023-05-27 RX ADMIN — HEPARIN SODIUM 5000 UNIT(S): 5000 INJECTION INTRAVENOUS; SUBCUTANEOUS at 15:16

## 2023-05-27 RX ADMIN — Medication 3 MILLILITER(S): at 18:32

## 2023-05-27 RX ADMIN — Medication 4 MILLILITER(S): at 06:00

## 2023-05-27 RX ADMIN — MEROPENEM 100 MILLIGRAM(S): 1 INJECTION INTRAVENOUS at 21:41

## 2023-05-27 RX ADMIN — LACOSAMIDE 150 MILLIGRAM(S): 50 TABLET ORAL at 23:55

## 2023-05-27 RX ADMIN — MEROPENEM 100 MILLIGRAM(S): 1 INJECTION INTRAVENOUS at 15:11

## 2023-05-27 RX ADMIN — DEXMEDETOMIDINE HYDROCHLORIDE IN 0.9% SODIUM CHLORIDE 3.5 MICROGRAM(S)/KG/HR: 4 INJECTION INTRAVENOUS at 15:12

## 2023-05-27 RX ADMIN — PANTOPRAZOLE SODIUM 40 MILLIGRAM(S): 20 TABLET, DELAYED RELEASE ORAL at 19:20

## 2023-05-27 RX ADMIN — HEPARIN SODIUM 5000 UNIT(S): 5000 INJECTION INTRAVENOUS; SUBCUTANEOUS at 06:03

## 2023-05-27 RX ADMIN — LACOSAMIDE 150 MILLIGRAM(S): 50 TABLET ORAL at 11:30

## 2023-05-27 RX ADMIN — PROPOFOL 4.2 MICROGRAM(S)/KG/MIN: 10 INJECTION, EMULSION INTRAVENOUS at 01:35

## 2023-05-27 RX ADMIN — Medication 3 MILLILITER(S): at 21:19

## 2023-05-27 RX ADMIN — CHLORHEXIDINE GLUCONATE 15 MILLILITER(S): 213 SOLUTION TOPICAL at 19:20

## 2023-05-27 RX ADMIN — HEPARIN SODIUM 5000 UNIT(S): 5000 INJECTION INTRAVENOUS; SUBCUTANEOUS at 21:50

## 2023-05-27 RX ADMIN — Medication 1 EACH: at 19:21

## 2023-05-27 RX ADMIN — SODIUM CHLORIDE 4 MILLILITER(S): 9 INJECTION INTRAMUSCULAR; INTRAVENOUS; SUBCUTANEOUS at 05:59

## 2023-05-27 RX ADMIN — CHLORHEXIDINE GLUCONATE 15 MILLILITER(S): 213 SOLUTION TOPICAL at 06:03

## 2023-05-27 RX ADMIN — Medication 1 APPLICATION(S): at 06:04

## 2023-05-27 RX ADMIN — Medication 81 MILLIGRAM(S): at 12:55

## 2023-05-27 RX ADMIN — PANTOPRAZOLE SODIUM 40 MILLIGRAM(S): 20 TABLET, DELAYED RELEASE ORAL at 13:01

## 2023-05-27 RX ADMIN — Medication 1 APPLICATION(S): at 19:21

## 2023-05-27 NOTE — PROGRESS NOTE ADULT - ASSESSMENT
52yo Male pt with PMH HTN, type A aortic dissection s/p Dacron grafts, AV resuspension in 2013, CAD s/p CABG x 1 SVG to RCA (5/2013 with Dr. Medina), seizure disorder (last episode on 7/4/22) S/P replacement of transverse aortic arch, second stage thoracic endovascular aortic repair, aorto-axillary bypass, AV replacement (bio 23mm), in APr 2023 and CABG x 1 (SVG-RCA) EF 75% (Ignacio, 3/20) now s/p 2nd state TEVAR on 5/5 for whom surgery was consulted for finding of acute pancreatitis with large peripancreatic/perigastric fluid collections on CTA abdomen 5/8 then acute hemorrhage starting 5/12/23 requiring 11 U pRBC over last 48 hours but with negative mesenteric angiogram 5/13/23. S/p paracentesis and LUQ collection aspiration (no drain per surgery) on 5/22/23. LUQ collection growing Klebsiella pneumoniae.    5/24/23:  LUQ drain placement by IR.     5/25/23:  LUQ drain upsizing. Placement of two additional 14 F drainage catheters in the left mid and lower abdomen. Placement of a left abdominal hematoma drain.     5/26/23:   Placement of a right ascites drain and right pelvic hematoma/abscess drain by IR. Upsized left abdomen hematoma drain and left pelvic abscess/hematoma drain to 16 Fr.    -Continue to monitor output   -IR to follow.

## 2023-05-27 NOTE — PROGRESS NOTE ADULT - SUBJECTIVE AND OBJECTIVE BOX
SUBJECTIVE: Pt seen and examined at bedside this am by surgery team. Patient appears comfortable, still intubated and sedated.    MEDICATIONS  (STANDING):  acetylcysteine 20%  Inhalation 4 milliLiter(s) Inhalation every 6 hours  albuterol/ipratropium for Nebulization 3 milliLiter(s) Nebulizer every 6 hours  ampicillin  IVPB 2 Gram(s) IV Intermittent every 6 hours  aspirin  chewable 81 milliGRAM(s) Oral daily  caspofungin IVPB      caspofungin IVPB 50 milliGRAM(s) IV Intermittent every 24 hours  chlorhexidine 0.12% Liquid 15 milliLiter(s) Oral Mucosa every 12 hours  chlorhexidine 2% Cloths 1 Application(s) Topical daily  CRRT Treatment    <Continuous>  dextrose 5%. 1000 milliLiter(s) (50 mL/Hr) IV Continuous <Continuous>  dextrose 5%. 1000 milliLiter(s) (100 mL/Hr) IV Continuous <Continuous>  dextrose 50% Injectable 25 Gram(s) IV Push once  dextrose 50% Injectable 12.5 Gram(s) IV Push once  dextrose 50% Injectable 25 Gram(s) IV Push once  fentaNYL   Infusion... 0.5 MICROgram(s)/kG/Hr (1.75 mL/Hr) IV Continuous <Continuous>  glucagon  Injectable 1 milliGRAM(s) IntraMuscular once  heparin   Injectable 5000 Unit(s) SubCutaneous every 8 hours  insulin lispro (ADMELOG) corrective regimen sliding scale   SubCutaneous every 6 hours  lacosamide IVPB 250 milliGRAM(s) IV Intermittent every 12 hours  meropenem  IVPB 1000 milliGRAM(s) IV Intermittent every 8 hours  norepinephrine Infusion 0.07 MICROgram(s)/kG/Min (9.19 mL/Hr) IV Continuous <Continuous>  pantoprazole  Injectable 40 milliGRAM(s) IV Push daily  Parenteral Nutrition - Adult 1 Each (70 mL/Hr) TPN Continuous <Continuous>  petrolatum Ophthalmic Ointment 1 Application(s) Both EYES every 6 hours  Phoxillum Filtration BK 4 / 2.5 5000 milliLiter(s) (2000 mL/Hr) CRRT <Continuous>  propofol Infusion 10 MICROgram(s)/kG/Min (4.2 mL/Hr) IV Continuous <Continuous>  sodium chloride 3%  Inhalation 4 milliLiter(s) Inhalation every 6 hours    MEDICATIONS  (PRN):  dextrose Oral Gel 15 Gram(s) Oral once PRN Blood Glucose LESS THAN 70 milliGRAM(s)/deciliter      Vital Signs Last 24 Hrs  T(C): 37.4 (27 May 2023 06:05), Max: 37.7 (26 May 2023 18:05)  T(F): 99.3 (27 May 2023 06:05), Max: 99.9 (26 May 2023 18:05)  HR: 118 (27 May 2023 06:31) (111 - 126)  BP: --  BP(mean): --  RR: 23 (27 May 2023 05:00) (22 - 27)  SpO2: 99% (27 May 2023 06:31) (92% - 100%)    Parameters below as of 27 May 2023 06:31  Patient On (Oxygen Delivery Method): ventilator      Physical Exam  Constitutional: sedated  Pulm: intubated, equal chest rise, no respiratory distress  CV: Regular rate and rhythm  Abd:  soft, moderately distended. Ascites drain - brown/red, thick; hematoma drain - red/black; pancreatic drain - red/black;  Extremities: no edema  Drains: Cantrell    I&O's Detail    26 May 2023 07:01  -  27 May 2023 07:00  --------------------------------------------------------  IN:    Cisatracurium: 105 mL    DOBUTamine: 10.5 mL    FentaNYL: 17.5 mL    FentaNYL: 68.4 mL    IV PiggyBack: 250 mL    IV PiggyBack: 100 mL    IV PiggyBack: 100 mL    IV PiggyBack: 200 mL    Midazolam: 21 mL    Norepinephrine: 121.8 mL    PRBCs (Packed Red Blood Cells): 300 mL    Propofol: 212.6 mL    sodium chloride 0.9%: 10 mL    TPN (Total Parenteral Nutrition): 1260 mL  Total IN: 2776.7 mL    OUT:    Bulb (mL): 30 mL    Bulb (mL): 200 mL    Drain (mL): 85 mL    Drain (mL): 130 mL    Drain (mL): 110 mL    Drain (mL): 30 mL    Indwelling Catheter - Urethral (mL): 0 mL    Nasogastric/Oral tube (mL): 200 mL    Other (mL): 500 mL    Other (mL): 1140 mL    Voided (mL): 65 mL  Total OUT: 2490 mL    Total NET: 286.7 mL        LABS:                        7.8    29.04 )-----------( 74       ( 27 May 2023 05:30 )             22.1     05-27    134<L>  |  103  |  37<H>  ----------------------------<  124<H>  4.1   |  22  |  0.90    Ca    8.1<L>      27 May 2023 05:30  Phos  3.7     05-27  Mg     2.5     05-27    TPro  4.8<L>  /  Alb  2.4<L>  /  TBili  7.0<H>  /  DBili  6.1<H>  /  AST  33  /  ALT  15  /  AlkPhos  222<H>  05-27    PT/INR - ( 27 May 2023 05:30 )   PT: 14.2 sec;   INR: 1.19          PTT - ( 27 May 2023 05:30 )  PTT:30.6 sec

## 2023-05-27 NOTE — PROGRESS NOTE ADULT - SUBJECTIVE AND OBJECTIVE BOX
-All drains flushed easily with 5 cc NS without resistance.  -All dressings changed on 5/27/2023 due to left abdominal hematoma and bilateral pelvic hematoma dressings being saturation.    1. 16 Fr teal LUQ peripancreatic abscess "Left Pancreatic CODIE Drain" placed on 5/24:   -225 cc serosanguinous output in 24 hrs. 255 cc in previous 24 hrs.     2. 16 Fr teal LUQ hematoma "Left Abdominal Hematoma CODIE Drain" placed on 5/25 and upsized on 5/26:   -130 cc dark sanguinous output in 24 hrs. 255 cc in previous 24 hrs.    3. 14 Fr white left mid abdomen pigtail ascites drain "Mid Abdominal Ascites CODIE Drain" placed on 5/25:  -20 cc serous output in 24 hrs. 30 cc in previous 24 hrs.      4. 16 Fr teal pelvic abscess/hematoma drain "Left Pelvic Hematoma" (previously "Lower Abdominal Ascites CODIE Drain" placed on 5/25) and upsized on 5/26.  -90 cc serosanguinous output in 24 hrs. 370 cc in previous 24 hrs.    5. 16 Fr teal ascites drain "Right/Ascites drain" placed on 5/26:  -270 cc serosanguinous output in 24 hrs since placement.    6. 16 Fr teal pelvic hematoma/abscess drain "Right Pelvic/hematoma drain" placed on 5/26.  -30 cc sanguinous output in 24 hrs since placement.

## 2023-05-27 NOTE — PROGRESS NOTE ADULT - ASSESSMENT
53yMale w/ PMHx of HTN, type A aortic dissection s/p Dacron grafts, AV resuspension in 2013, CAD s/p CABG x 1 SVG to RCA (5/2013 with Dr. Medina), seizure disorder, recent admission 3/20-4/14 for replacement of transverse aortic arch, second stage TEVAR and aorto-axillary bypass, AV replacement (bio 23mm), and CABG x 1 (SVG-RCA) EF 75% ([place], 3/20). Post op c/b severe cardiogenic and vasogenic shock, TREY requiring CVVHD and temporary dialysis, eventually came off RRT and discharged home mid April. pt then presented to [facility] ED (4/27-4/30) for syncope vs seizure episode at home. negative neuro work up for Seizures AED dose adjusted. Presented to [place] on 5/4 for abdominal pain, imaging revealed recent graft endoleak, transferred to [facility] on 5/5 for surgical mgmt. Pt taken to OR for Second stage TEVAR on 5/5, post op course c/b Klebsiella bacteremia (5/15), resp failure requiring intubation on 5/18, Bronch 5/19 with cx growing kleb, enterobacter (zosyn, erta resistant), E faecalis, strep angionosus, hemorrhagic pancreatitis of unclear etiology with venu-pancreatic abscess (per CT A/P from 5/8 and 5/13) s/p IR aspiration of peripancreatic fluid collection (15cc sanguinous pansensitive kleb 5/22) and paracentesis (4L clear yellow fluid, ascites cx negative) on 5/22, IR venu-pancreatic abscess drainage and placement of drainage catheter on 5/24, as well as PEA arrest. pt now transferred from CTICU to SICU for further mgmt of hemorrhagic pancreatitis. s/p IR placement of 2 new drainage catheters and catheter exchange.     NEURO: Propofol/Fentanyl drip. Hx seizure with episode of seizure on 5/12 s/p epilepsy consult, on vimpat,. Depakote weaned off due to concerns for drug-related hemorrhagic pancreatitis.   CV: s/p PEA arrest on 5/24 night, Septic shock: On Levophed for MAP> 65 . Echo from 5/19 hyperdynamic LV, SHAJI with LVOTO (gradient 26), normal RV, mild MR, no pulm htn,   PULM: intubated 5/13 on AC 40/500/22/8, ARDS now improved off NO, s/p multiple Mucus plugs and bronchs (most recently on 5/26), bronch cx from 5/19 kleb, enterobacter (zosyn, erta resistant), E faecalis, strep angionosus. Cont Hypertonic saline, Duonebs, and Mucomyst q6 along with Chest PT q4    GI/FEN: NPO on TPN, protonix ppx, Surg intially consulted for acute pancreatitis on 5/8, then reconsulted on 5/13 with concerns for active hemorrhage of pancreatic tail and peripancreatic abscess seen on repeat CT, s/p negative mesenteric angio on 5/13, s/p paracentesis cx neg on 5/22, venu-pancreatic abscess s/p aspiration 5/22, then drain placement 5/24. Upsizing of current drain, secondary drain placement, 2 peritoneal drains placed 5/25. Additional 2 drains placed 5/26 with upsizing of current hematoma drain.   : TREY on CVVHD. Running net even. Nephro following. Cantrell removed on 5/26- Bladder scan daily.   HEME: Acute blood loss anemia on 5/12 requiring 11units of PRBC. IR negative for mesenteric extrav.   ENDO: mISS  ID: Chloe (5/21- ) Ampicillin (5/21-- ) Caspo (5/23--) ID following. Kleb bacteremia from 5/15 & 5/17, subsequent bld cx negative. PNA w/ bronch cx kleb, enterobacter (zosyn, erta resistant), E faecalis, strep angionosus from 5/19, pancreatic abscess cx pan-sensitive kleb 5/22.   PPX: SCDs, SQH.   LINES: R Ax kalpana (5/12--), RIJ TLC (5/22--), RIJ HD cath (5/22--), [facility] TLC (5/23--)   WOUNDS/DRAINS: left venu-pancreatic abscess CODIE drain x 2, ascites CODIE x 2   No

## 2023-05-27 NOTE — PROGRESS NOTE ADULT - SUBJECTIVE AND OBJECTIVE BOX
Patient is a 53y Male seen and evaluated at bedside. Remains intubated, sedated, on pressors. CVVHD running without complications.       Meds:    albuterol/ipratropium for Nebulization 3 every 6 hours  ampicillin  IVPB 2 every 6 hours  aspirin  chewable 81 daily  caspofungin IVPB    caspofungin IVPB 50 every 24 hours  chlorhexidine 0.12% Liquid 15 every 12 hours  chlorhexidine 2% Cloths 1 daily  CRRT Treatment  <Continuous>  dextrose 5%. 1000 <Continuous>  dextrose 5%. 1000 <Continuous>  dextrose 50% Injectable 25 once  dextrose 50% Injectable 12.5 once  dextrose 50% Injectable 25 once  dextrose Oral Gel 15 once PRN  fentaNYL   Infusion... 0.5 <Continuous>  glucagon  Injectable 1 once  heparin   Injectable 5000 every 8 hours  insulin lispro (ADMELOG) corrective regimen sliding scale  every 6 hours  lacosamide IVPB 250 every 12 hours  meropenem  IVPB 1000 every 8 hours  norepinephrine Infusion 0.07 <Continuous>  pantoprazole  Injectable 40 daily  Parenteral Nutrition - Adult 1 <Continuous>  petrolatum Ophthalmic Ointment 1 every 6 hours  Phoxillum Filtration BK 4 / 2.5 5000 <Continuous>  propofol Infusion 10 <Continuous>      T(C): , Max: 37.7 (05-26-23 @ 18:05)  T(F): , Max: 99.9 (05-26-23 @ 18:05)  HR: 120 (05-27-23 @ 08:00)  BP: --  BP(mean): --  RR: 22 (05-27-23 @ 08:00)  SpO2: 99% (05-27-23 @ 08:00)  Wt(kg): --    05-26 @ 07:01  -  05-27 @ 07:00  --------------------------------------------------------  IN: 3123 mL / OUT: 2995 mL / NET: 128 mL    05-27 @ 07:01  -  05-27 @ 09:22  --------------------------------------------------------  IN: 31.3 mL / OUT: 155 mL / NET: -123.7 mL          Review of Systems:  ROS negative except as per HPI      PHYSICAL EXAM:  GENERAL: intubated; sedated   CHEST/LUNG: mechanical breath sounds BL  HEART: normal S1S2, RRR  ABDOMEN: Soft, Nontender, +BS, No flank tenderness bilateral  EXTREMITIES: + edema BL LE  SKIN: no lesions or rashes   ACCESS: R IJ HD cath         LABS:                        7.8    29.04 )-----------( 74       ( 27 May 2023 05:30 )             22.1     05-27    134<L>  |  103  |  37<H>  ----------------------------<  124<H>  4.1   |  22  |  0.90    Ca    8.1<L>      27 May 2023 05:30  Phos  3.7     05-27  Mg     2.5     05-27    TPro  4.8<L>  /  Alb  2.4<L>  /  TBili  7.0<H>  /  DBili  6.1<H>  /  AST  33  /  ALT  15  /  AlkPhos  222<H>  05-27      PT/INR - ( 27 May 2023 05:30 )   PT: 14.2 sec;   INR: 1.19          PTT - ( 27 May 2023 05:30 )  PTT:30.6 sec          RADIOLOGY & ADDITIONAL STUDIES:

## 2023-05-27 NOTE — PROGRESS NOTE ADULT - SUBJECTIVE AND OBJECTIVE BOX
INTERVAL HPI/OVERNIGHT EVENTS:    Patient was seen and examined at bedside.  Remains on CVVHD.  Afebrile    ROS:    unable to obtain       ANTIBIOTICS/RELEVANT:    MEDICATIONS  (STANDING):  albuterol/ipratropium for Nebulization 3 milliLiter(s) Nebulizer every 6 hours  ampicillin  IVPB 2 Gram(s) IV Intermittent every 6 hours  aspirin  chewable 81 milliGRAM(s) Oral daily  caspofungin IVPB 50 milliGRAM(s) IV Intermittent every 24 hours  caspofungin IVPB      chlorhexidine 0.12% Liquid 15 milliLiter(s) Oral Mucosa every 12 hours  chlorhexidine 2% Cloths 1 Application(s) Topical daily  CRRT Treatment    <Continuous>  dextrose 5%. 1000 milliLiter(s) (50 mL/Hr) IV Continuous <Continuous>  dextrose 5%. 1000 milliLiter(s) (100 mL/Hr) IV Continuous <Continuous>  dextrose 50% Injectable 25 Gram(s) IV Push once  dextrose 50% Injectable 25 Gram(s) IV Push once  dextrose 50% Injectable 12.5 Gram(s) IV Push once  fentaNYL   Infusion... 0.5 MICROgram(s)/kG/Hr (1.75 mL/Hr) IV Continuous <Continuous>  glucagon  Injectable 1 milliGRAM(s) IntraMuscular once  heparin   Injectable 5000 Unit(s) SubCutaneous every 8 hours  insulin lispro (ADMELOG) corrective regimen sliding scale   SubCutaneous every 6 hours  lacosamide IVPB 250 milliGRAM(s) IV Intermittent every 12 hours  meropenem  IVPB 1000 milliGRAM(s) IV Intermittent every 8 hours  norepinephrine Infusion 0.07 MICROgram(s)/kG/Min (9.19 mL/Hr) IV Continuous <Continuous>  pantoprazole  Injectable 40 milliGRAM(s) IV Push daily  Parenteral Nutrition - Adult 1 Each (70 mL/Hr) TPN Continuous <Continuous>  Parenteral Nutrition - Adult 1 Each (70 mL/Hr) TPN Continuous <Continuous>  petrolatum Ophthalmic Ointment 1 Application(s) Both EYES every 6 hours  Phoxillum Filtration BK 4 / 2.5 5000 milliLiter(s) (2000 mL/Hr) CRRT <Continuous>  propofol Infusion 10 MICROgram(s)/kG/Min (4.2 mL/Hr) IV Continuous <Continuous>    MEDICATIONS  (PRN):  dextrose Oral Gel 15 Gram(s) Oral once PRN Blood Glucose LESS THAN 70 milliGRAM(s)/deciliter        Vital Signs Last 24 Hrs  T(C): 37.4 (27 May 2023 09:00), Max: 37.7 (26 May 2023 18:05)  T(F): 99.3 (27 May 2023 09:00), Max: 99.9 (26 May 2023 18:05)  HR: 128 (27 May 2023 11:00) (111 - 128)  BP: --  BP(mean): --  RR: 23 (27 May 2023 10:00) (22 - 27)  SpO2: 100% (27 May 2023 11:00) (92% - 100%)    Parameters below as of 27 May 2023 11:00  Patient On (Oxygen Delivery Method): ventilator, Pt on CPAP    O2 Concentration (%): 40    PHYSICAL EXAM:  Constitutional:   ill appearing, intubated  Eyes:IVAN, EOMI  Ear/Nose/Throat: no oral lesion, no sinus tenderness on percussion	  Neck:  supple  Respiratory: CTA marti  Cardiovascular: S1S2 RRR, no murmurs  Gastrointestinal:soft, (+) BS, no HSM  Extremities:no e/e/c  Vascular: DP Pulse:	right normal; left normal      LABS:                        7.8    29.04 )-----------( 74       ( 27 May 2023 05:30 )             22.1     05-27    134<L>  |  103  |  37<H>  ----------------------------<  124<H>  4.1   |  22  |  0.90    Ca    8.1<L>      27 May 2023 05:30  Phos  3.7     05-27  Mg     2.5     05-27    TPro  4.8<L>  /  Alb  2.4<L>  /  TBili  7.0<H>  /  DBili  6.1<H>  /  AST  33  /  ALT  15  /  AlkPhos  222<H>  05-27    PT/INR - ( 27 May 2023 05:30 )   PT: 14.2 sec;   INR: 1.19          PTT - ( 27 May 2023 05:30 )  PTT:30.6 sec      MICROBIOLOGY:    Culture - Bronchial (05.26.23 @ 16:04)    Gram Stain:   No epithelial cells  Rare WBC's  No organisms seen   Specimen Source: Bronch Wash Bronchial Wash   Culture Results:   No growth to date    Culture - Body Fluid with Gram Stain (05.26.23 @ 11:00)    Gram Stain:   No organisms seen  Rare WBC's   Specimen Source: .Body Fluid R-Abdominal Ascites   Culture Results:   No growth to date    Culture - Abscess with Gram Stain (05.25.23 @ 17:01)    -  Trimethoprim/Sulfamethoxazole: S <=0.5/9.5   -  Ceftriaxone: S <=1 Enterobacter, Klebsiella aerogenes, Citrobacter, and Serratia may develop resistance during prolonged therapy   -  Ciprofloxacin: S <=0.25   -  Ertapenem: S <=0.5   -  Gentamicin: S <=2   -  Piperacillin/Tazobactam: S <=8   -  Tobramycin: S <=2   Gram Stain:   Rare Gram Negative Rods  Moderate WBC's   -  Ampicillin: R >16 These ampicillin results predict results for amoxicillin   -  Ampicillin/Sulbactam: I 16/8 Enterobacter, Klebsiella aerogenes, Citrobacter, and Serratia may develop resistance during prolonged therapy (3-4 days)   -  Cefazolin: S <=2 Enterobacter, Klebsiella aerogenes, Citrobacter, and Serratia may develop resistance during prolonged therapy (3-4 days)   Specimen Source: .Abscess L abd hematoma collection   Culture Results:   Numerous Klebsiella pneumoniae   Organism Identification: Klebsiella pneumoniae  Klebsiella pneumoniae   Organism: Klebsiella pneumoniae   Organism: Klebsiella pneumoniae   Method Type: LIZETTE   Method Type: LIZETTE        RADIOLOGY & ADDITIONAL STUDIES:      < from: CT Chest w/ Oral Cont (05.26.23 @ 10:06) >  IMPRESSION:  Left retroperitoneal hematoma minimally improved from 5/22. Percutaneous   drain at the inferior aspect of hematoma.    Near complete resolution of left upper quadrant collection with drain in   place.    Mildly increased generalized ascites, drains as above. Increased anasarca.    Other findings as above not seen or change from 5/22.      < end of copied text >

## 2023-05-27 NOTE — PROGRESS NOTE ADULT - ASSESSMENT
This is a 54yo Male pt with PMH HTN, type A aortic dissection s/p Dacron grafts, AV resuspension in 2013, CAD s/p CABG x 1 SVG to RCA (5/2013 with Dr. Medina), seizure disorder (last episode on 7/4/22) S/P replacement of transverse aortic arch, second stage thoracic endovascular aortic repair, aorto-axillary bypass, AV replacement (bio 23mm), in APr 2023 and CABG x 1 (SVG-RCA) EF 75% ([place]acio, 3/20) now s/p 2nd state TEVAR on 5/5 for whom surgery was consulted for finding of acute pancreatitis with large peripancreatic/perigastric fluid collections on CTA abdomen 5/8 then acute hemorrhage starting 5/12/23 requiring 11 U pRBC but with negative mesenteric angiogram 5/13/23 for whom hepatobiliary surgery was reconsulted for possible hemorrhagic pancreatitis, now with 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     - Wean pressors as appropriate  - CPAP trial and extubate when appropriate  - CVVH per SICU  - C/w Abx - ampicillin, meropenem, caspofungin  - Care per SICU

## 2023-05-27 NOTE — PROGRESS NOTE ADULT - ATTENDING COMMENTS
CAD s/p TEVAR, hemorrhagic pancreatitis complicated by klebs bacteremia, s/p multiple drains for source control, on CVVH.    Edematous, starting to slowly remove fluid.  Continue abx( caspo, shin)  decreasing sedation on ce off Armaan

## 2023-05-27 NOTE — PROGRESS NOTE ADULT - ASSESSMENT
53 YO Male, HTN aortic dissection previous admission with severe cardiogenic shock TREY requiring CVVHD. Presented to the ED with worsening epigastric pain CTA/P revealed continued endoleak hospital course complicated by necrotic pancreatitis now with oliguric TREY    #oliguric TREY  #necrotizing pancreatitis    recommend:  c/w CVVHD for UF and clearance   will c/w phoxillum as phos low-normal  Qb 300ml/min DFR 2L/hour UF, now at net even fluid goal  q8H BMP phos while on CVVHD  maintain MAP > 70 for adequate renal perfusion  strict i's and o's  renally dose abx egfr <15     51 YO Male, HTN aortic dissection previous admission with severe cardiogenic shock TREY requiring CVVHD. Presented to the ED with worsening epigastric pain CTA/P revealed continued endoleak hospital course complicated by necrotic pancreatitis now with oliguric TREY    #oliguric TREY  #necrotizing pancreatitis    recommend:  c/w CVVHD for UF and clearance   will c/w phoxillum as phos low-normal  Qb 300ml/min DFR 2L/hour UF, net neg 20ml/hr to a goal of net neg 500ml/day  q8H BMP phos while on CVVHD  maintain MAP > 70 for adequate renal perfusion  strict i's and o's  renally dose abx egfr <15      Discussed with primary team

## 2023-05-27 NOTE — PROGRESS NOTE ADULT - ASSESSMENT
IMPRESSION:  53 year old male hx seizure disorder, aortic dissection s/p AVR, aortic graft revision 3/20/2023, second stage TEVAR 5/5/2023, course c/b peripancreatic/RP hemorrhage/hematoma formation.     Recommend:  1.  Continue Meropenem 1 gram IV q8hrs  2. Continue Caspofungin 50 mg IV daily  3.  Can stop Ampicillin given last enterococcus isolated was a sputum culture over one week ago  4.  Follow pending cultures    ID team 1 will follow

## 2023-05-27 NOTE — PROGRESS NOTE ADULT - SUBJECTIVE AND OBJECTIVE BOX
Phosphorus Level, Serum: 3.7 mg/dL (05-27-23 @ 05:30)  Hemoglobin: 7.8 g/dL (05-27-23 @ 05:30)  Phosphorus Level, Serum: 3.7 mg/dL (05-26-23 @ 22:55)  Hemoglobin: 6.9 g/dL (05-26-23 @ 22:55)    Albumin, Serum: 2.4 g/dL (05-27-23 @ 05:30)  Albumin, Serum: 2.6 g/dL (05-26-23 @ 11:11)    T(C): 37.4 (05-27-23 @ 09:00), Max: 37.7 (05-26-23 @ 18:05)  HR: 121 (05-27-23 @ 10:00) (111 - 126)  BP: --  RR: 23 (05-27-23 @ 10:00) (22 - 27)  SpO2: 99% (05-27-23 @ 10:00) (92% - 100%)      CRRT Treatment:   Modality: CVVHD, Filter: NxStage CAR-505, Target Blood Flow: 300 mL/Min  Target Fluid Balance: Titrate to net NEGATIVE fluid balance, 20 mL/Hr (05-27-23 @ 10:25) [Active]  Phoxillum Filtration BK 4 / 2.5: Solution, 5000 milliLiter(s) infuse at 2000 mL/Hr; infuse through CRRT Circuit  Administration Instructions: Continuous Renal Replacement Therapy (CRRT)  Special Instructions: Dialysate (05-27-23 @ 10:25) [Active]      Seen on CVVHD. Tolerating well. 40hr cartridge life noted.  optimal. AP slightly high this AM. Discussed with primary team, pressor requirement decreasing so will change from net even to UF of 20ml/hr to target 0.5L/day

## 2023-05-27 NOTE — PROGRESS NOTE ADULT - SUBJECTIVE AND OBJECTIVE BOX
ON: Oozy around [place]/CODIE, [place] CBC Hg 6.8--gave 1 u RBC, Coags/fibrinogen ok, BMP ok, ABG ok. Post xf 7.8 (8.9)  : CT CAP:Left retroperitoneal hematoma minimally improved from . Percutaneous drain at the inferior aspect of hematoma. Near complete resolution of LUQ collection with drain in place. s/p IR:S/p RT LQ drain placement and upsized the LUQ drain to 15 Fr.    Reglan 5q8 added for contrast propagation then dc'd. Vaso off,  to 2.5  DC'd.  Levo started during IR procedure now weaning off.  NO decreased to 15ppm then 10 then 5 Poss dc tonight Fio2 decreased to 40%. Nimbex Dc'd after CT scan Switched to Propofol from Versed. Labs post op: Hgb stable LA 1.8. Fibrinogen 325.  DC coulter, & Bladder scan Daily. CVVHD:started @5pm f/u CVVHD labs @[place]. Mucus plugged in afternoon- Bronched and ETT adjusted for post bronch leak. CXR showing at appropriate level.     SUBJECTIVE: Continues to be sedated and intubated, levophed titrated down currently at 0.04, drains stripped and all working, on CVVHD.    MEDICATIONS  (STANDING):  albuterol/ipratropium for Nebulization 3 milliLiter(s) Nebulizer every 6 hours  ampicillin  IVPB 2 Gram(s) IV Intermittent every 6 hours  aspirin  chewable 81 milliGRAM(s) Oral daily  caspofungin IVPB 50 milliGRAM(s) IV Intermittent every 24 hours  caspofungin IVPB      chlorhexidine 0.12% Liquid 15 milliLiter(s) Oral Mucosa every 12 hours  chlorhexidine 2% Cloths 1 Application(s) Topical daily  CRRT Treatment    <Continuous>  dextrose 5%. 1000 milliLiter(s) (100 mL/Hr) IV Continuous <Continuous>  dextrose 5%. 1000 milliLiter(s) (50 mL/Hr) IV Continuous <Continuous>  dextrose 50% Injectable 25 Gram(s) IV Push once  dextrose 50% Injectable 12.5 Gram(s) IV Push once  dextrose 50% Injectable 25 Gram(s) IV Push once  fentaNYL   Infusion... 0.5 MICROgram(s)/kG/Hr (1.75 mL/Hr) IV Continuous <Continuous>  glucagon  Injectable 1 milliGRAM(s) IntraMuscular once  heparin   Injectable 5000 Unit(s) SubCutaneous every 8 hours  insulin lispro (ADMELOG) corrective regimen sliding scale   SubCutaneous every 6 hours  lacosamide IVPB 250 milliGRAM(s) IV Intermittent every 12 hours  meropenem  IVPB 1000 milliGRAM(s) IV Intermittent every 8 hours  norepinephrine Infusion 0.07 MICROgram(s)/kG/Min (9.19 mL/Hr) IV Continuous <Continuous>  pantoprazole  Injectable 40 milliGRAM(s) IV Push daily  Parenteral Nutrition - Adult 1 Each (70 mL/Hr) TPN Continuous <Continuous>  Parenteral Nutrition - Adult 1 Each (70 mL/Hr) TPN Continuous <Continuous>  petrolatum Ophthalmic Ointment 1 Application(s) Both EYES every 6 hours  Phoxillum Filtration BK 4 / 2.5 5000 milliLiter(s) (2000 mL/Hr) CRRT <Continuous>  propofol Infusion 10 MICROgram(s)/kG/Min (4.2 mL/Hr) IV Continuous <Continuous>    MEDICATIONS  (PRN):  dextrose Oral Gel 15 Gram(s) Oral once PRN Blood Glucose LESS THAN 70 milliGRAM(s)/deciliter      Drips:     ICU Vital Signs Last 24 Hrs  T(C): 37.4 (27 May 2023 12:00), Max: 37.7 (26 May 2023 18:05)  T(F): 99.3 (27 May 2023 12:00), Max: 99.9 (26 May 2023 18:05)  HR: 123 (27 May 2023 12:00) (111 - 128)  BP: --  BP(mean): --  ABP: 91/55 (27 May 2023 12:00) (89/54 - 169/102)  ABP(mean): 69 (27 May 2023 12:00) (67 - 132)  RR: 23 (27 May 2023 10:00) (22 - 27)  SpO2: 100% (27 May 2023 12:00) (92% - 100%)    O2 Parameters below as of 27 May 2023 11:00  Patient On (Oxygen Delivery Method): ventilator, Pt on CPAP    O2 Concentration (%): 40        Physical Exam:    General: Sedated  HEENT: NC/AC, ETT in place  Pulmonary: AC, intubated, O2 off  Cardiovascular: NSR, s1 s2, on 0.04 levophed  Abdominal: Soft, NTND, multiple drains in place all working appropriately  Extremities: WWP  Neuro: Sedated      Lines/tubes/drains:  Coulter:	      Vent settings:  Mode: AC/ CMV (Assist Control/ Continuous Mandatory Ventilation), RR (machine): 22, TV (machine): 500, FiO2: 40, PEEP: 8, ITime: 1, MAP: 13, PIP: 20    I&O's Summary    26 May 2023 07:01  -  27 May 2023 07:00  --------------------------------------------------------  IN: 3123 mL / OUT: 2995 mL / NET: 128 mL    27 May 2023 07:01  -  27 May 2023 12:46  --------------------------------------------------------  IN: 595.9 mL / OUT: 621 mL / NET: -25.1 mL        LABS:                        7.8    29.04 )-----------( 74       ( 27 May 2023 05:30 )             22.1         134<L>  |  103  |  37<H>  ----------------------------<  124<H>  4.1   |  22  |  0.90    Ca    8.1<L>      27 May 2023 05:30  Phos  3.7       Mg     2.5         TPro  4.8<L>  /  Alb  2.4<L>  /  TBili  7.0<H>  /  DBili  6.1<H>  /  AST  33  /  ALT  15  /  AlkPhos  222<H>  0527    PT/INR - ( 27 May 2023 05:30 )   PT: 14.2 sec;   INR: 1.19          PTT - ( 27 May 2023 05:30 )  PTT:30.6 sec    CAPILLARY BLOOD GLUCOSE      POCT Blood Glucose.: 98 mg/dL (27 May 2023 12:44)  POCT Blood Glucose.: 102 mg/dL (26 May 2023 18:27)  POCT Blood Glucose.: 91 mg/dL (26 May 2023 13:02)    LIVER FUNCTIONS - ( 27 May 2023 05:30 )  Alb: 2.4 g/dL / Pro: 4.8 g/dL / ALK PHOS: 222 U/L / ALT: 15 U/L / AST: 33 U/L / GGT: x             Cultures:Culture Results:   No growth to date ( @ 16:04)  Culture Results:   No growth to date ( @ 11:00)  Culture Results:   No growth to date ( @ 11:00)  Culture Results:   Numerous Klebsiella pneumoniae ( @ 17:01)      RADIOLOGY & ADDITIONAL STUDIES:

## 2023-05-28 LAB
ALBUMIN SERPL ELPH-MCNC: 2.1 G/DL — LOW (ref 3.3–5)
ALP SERPL-CCNC: 269 U/L — HIGH (ref 40–120)
ALT FLD-CCNC: 15 U/L — SIGNIFICANT CHANGE UP (ref 10–45)
ANION GAP SERPL CALC-SCNC: 8 MMOL/L — SIGNIFICANT CHANGE UP (ref 5–17)
ANION GAP SERPL CALC-SCNC: 9 MMOL/L — SIGNIFICANT CHANGE UP (ref 5–17)
APTT BLD: 34.3 SEC — SIGNIFICANT CHANGE UP (ref 27.5–35.5)
AST SERPL-CCNC: 32 U/L — SIGNIFICANT CHANGE UP (ref 10–40)
BASE EXCESS BLDA CALC-SCNC: -0.5 MMOL/L — SIGNIFICANT CHANGE UP (ref -2–3)
BASE EXCESS BLDV CALC-SCNC: -0.8 MMOL/L — SIGNIFICANT CHANGE UP (ref -2–3)
BILIRUB DIRECT SERPL-MCNC: 3.5 MG/DL — HIGH (ref 0–0.3)
BILIRUB INDIRECT FLD-MCNC: 0.9 MG/DL — SIGNIFICANT CHANGE UP (ref 0.2–1)
BILIRUB SERPL-MCNC: 4.4 MG/DL — HIGH (ref 0.2–1.2)
BUN SERPL-MCNC: 28 MG/DL — HIGH (ref 7–23)
BUN SERPL-MCNC: 29 MG/DL — HIGH (ref 7–23)
BUN SERPL-MCNC: 31 MG/DL — HIGH (ref 7–23)
BUN SERPL-MCNC: 32 MG/DL — HIGH (ref 7–23)
CA-I SERPL-SCNC: 1.26 MMOL/L — SIGNIFICANT CHANGE UP (ref 1.15–1.33)
CALCIUM SERPL-MCNC: 7.8 MG/DL — LOW (ref 8.4–10.5)
CALCIUM SERPL-MCNC: 7.9 MG/DL — LOW (ref 8.4–10.5)
CALCIUM SERPL-MCNC: 8.1 MG/DL — LOW (ref 8.4–10.5)
CALCIUM SERPL-MCNC: 8.1 MG/DL — LOW (ref 8.4–10.5)
CHLORIDE SERPL-SCNC: 102 MMOL/L — SIGNIFICANT CHANGE UP (ref 96–108)
CHLORIDE SERPL-SCNC: 103 MMOL/L — SIGNIFICANT CHANGE UP (ref 96–108)
CK SERPL-CCNC: 56 U/L — SIGNIFICANT CHANGE UP (ref 30–200)
CO2 BLDA-SCNC: 24 MMOL/L — SIGNIFICANT CHANGE UP (ref 19–24)
CO2 BLDV-SCNC: 25.6 MMOL/L — SIGNIFICANT CHANGE UP (ref 22–26)
CO2 SERPL-SCNC: 22 MMOL/L — SIGNIFICANT CHANGE UP (ref 22–31)
CREAT SERPL-MCNC: 0.73 MG/DL — SIGNIFICANT CHANGE UP (ref 0.5–1.3)
CREAT SERPL-MCNC: 0.75 MG/DL — SIGNIFICANT CHANGE UP (ref 0.5–1.3)
CREAT SERPL-MCNC: 0.79 MG/DL — SIGNIFICANT CHANGE UP (ref 0.5–1.3)
CREAT SERPL-MCNC: 0.81 MG/DL — SIGNIFICANT CHANGE UP (ref 0.5–1.3)
CULTURE RESULTS: SIGNIFICANT CHANGE UP
CULTURE RESULTS: SIGNIFICANT CHANGE UP
EGFR: 105 ML/MIN/1.73M2 — SIGNIFICANT CHANGE UP
EGFR: 106 ML/MIN/1.73M2 — SIGNIFICANT CHANGE UP
EGFR: 107 ML/MIN/1.73M2 — SIGNIFICANT CHANGE UP
EGFR: 108 ML/MIN/1.73M2 — SIGNIFICANT CHANGE UP
GAS PNL BLDV: 134 MMOL/L — LOW (ref 136–145)
GAS PNL BLDV: SIGNIFICANT CHANGE UP
GLUCOSE BLDC GLUCOMTR-MCNC: 119 MG/DL — HIGH (ref 70–99)
GLUCOSE SERPL-MCNC: 116 MG/DL — HIGH (ref 70–99)
GLUCOSE SERPL-MCNC: 128 MG/DL — HIGH (ref 70–99)
GLUCOSE SERPL-MCNC: 129 MG/DL — HIGH (ref 70–99)
GLUCOSE SERPL-MCNC: 153 MG/DL — HIGH (ref 70–99)
HCO3 BLDA-SCNC: 23 MMOL/L — SIGNIFICANT CHANGE UP (ref 21–28)
HCO3 BLDV-SCNC: 24 MMOL/L — SIGNIFICANT CHANGE UP (ref 22–29)
HCT VFR BLD CALC: 18.8 % — CRITICAL LOW (ref 39–50)
HCT VFR BLD CALC: 20.7 % — CRITICAL LOW (ref 39–50)
HCT VFR BLD CALC: 26.1 % — LOW (ref 39–50)
HGB BLD-MCNC: 6.5 G/DL — CRITICAL LOW (ref 13–17)
HGB BLD-MCNC: 7.3 G/DL — LOW (ref 13–17)
HGB BLD-MCNC: 9.1 G/DL — LOW (ref 13–17)
INR BLD: 1.27 — HIGH (ref 0.88–1.16)
LACTATE SERPL-SCNC: 1.1 MMOL/L — SIGNIFICANT CHANGE UP (ref 0.5–2)
MAGNESIUM SERPL-MCNC: 2.3 MG/DL — SIGNIFICANT CHANGE UP (ref 1.6–2.6)
MAGNESIUM SERPL-MCNC: 2.4 MG/DL — SIGNIFICANT CHANGE UP (ref 1.6–2.6)
MAGNESIUM SERPL-MCNC: 2.5 MG/DL — SIGNIFICANT CHANGE UP (ref 1.6–2.6)
MAGNESIUM SERPL-MCNC: 2.5 MG/DL — SIGNIFICANT CHANGE UP (ref 1.6–2.6)
MCHC RBC-ENTMCNC: 30.8 PG — SIGNIFICANT CHANGE UP (ref 27–34)
MCHC RBC-ENTMCNC: 31.7 PG — SIGNIFICANT CHANGE UP (ref 27–34)
MCHC RBC-ENTMCNC: 31.9 PG — SIGNIFICANT CHANGE UP (ref 27–34)
MCHC RBC-ENTMCNC: 34.6 GM/DL — SIGNIFICANT CHANGE UP (ref 32–36)
MCHC RBC-ENTMCNC: 34.9 GM/DL — SIGNIFICANT CHANGE UP (ref 32–36)
MCHC RBC-ENTMCNC: 35.3 GM/DL — SIGNIFICANT CHANGE UP (ref 32–36)
MCV RBC AUTO: 88.5 FL — SIGNIFICANT CHANGE UP (ref 80–100)
MCV RBC AUTO: 90.4 FL — SIGNIFICANT CHANGE UP (ref 80–100)
MCV RBC AUTO: 91.7 FL — SIGNIFICANT CHANGE UP (ref 80–100)
NRBC # BLD: 0 /100 WBCS — SIGNIFICANT CHANGE UP (ref 0–0)
PCO2 BLDA: 34 MMHG — LOW (ref 35–48)
PCO2 BLDV: 41 MMHG — LOW (ref 42–55)
PH BLDA: 7.44 — SIGNIFICANT CHANGE UP (ref 7.35–7.45)
PH BLDV: 7.38 — SIGNIFICANT CHANGE UP (ref 7.32–7.43)
PHOSPHATE SERPL-MCNC: 3.6 MG/DL — SIGNIFICANT CHANGE UP (ref 2.5–4.5)
PHOSPHATE SERPL-MCNC: 3.8 MG/DL — SIGNIFICANT CHANGE UP (ref 2.5–4.5)
PHOSPHATE SERPL-MCNC: 4.1 MG/DL — SIGNIFICANT CHANGE UP (ref 2.5–4.5)
PHOSPHATE SERPL-MCNC: 4.2 MG/DL — SIGNIFICANT CHANGE UP (ref 2.5–4.5)
PLATELET # BLD AUTO: 66 K/UL — LOW (ref 150–400)
PLATELET # BLD AUTO: 67 K/UL — LOW (ref 150–400)
PLATELET # BLD AUTO: 70 K/UL — LOW (ref 150–400)
PO2 BLDA: 107 MMHG — SIGNIFICANT CHANGE UP (ref 83–108)
PO2 BLDV: 37 MMHG — SIGNIFICANT CHANGE UP (ref 25–45)
POTASSIUM BLDV-SCNC: 4.4 MMOL/L — SIGNIFICANT CHANGE UP (ref 3.5–5.1)
POTASSIUM SERPL-MCNC: 3.9 MMOL/L — SIGNIFICANT CHANGE UP (ref 3.5–5.3)
POTASSIUM SERPL-MCNC: 4.2 MMOL/L — SIGNIFICANT CHANGE UP (ref 3.5–5.3)
POTASSIUM SERPL-MCNC: 4.2 MMOL/L — SIGNIFICANT CHANGE UP (ref 3.5–5.3)
POTASSIUM SERPL-MCNC: 4.3 MMOL/L — SIGNIFICANT CHANGE UP (ref 3.5–5.3)
POTASSIUM SERPL-SCNC: 3.9 MMOL/L — SIGNIFICANT CHANGE UP (ref 3.5–5.3)
POTASSIUM SERPL-SCNC: 4.2 MMOL/L — SIGNIFICANT CHANGE UP (ref 3.5–5.3)
POTASSIUM SERPL-SCNC: 4.2 MMOL/L — SIGNIFICANT CHANGE UP (ref 3.5–5.3)
POTASSIUM SERPL-SCNC: 4.3 MMOL/L — SIGNIFICANT CHANGE UP (ref 3.5–5.3)
PREALB SERPL-MCNC: 9 MG/DL — LOW (ref 20–40)
PROT SERPL-MCNC: 4.9 G/DL — LOW (ref 6–8.3)
PROTHROM AB SERPL-ACNC: 15.1 SEC — HIGH (ref 10.5–13.4)
RBC # BLD: 2.05 M/UL — LOW (ref 4.2–5.8)
RBC # BLD: 2.29 M/UL — LOW (ref 4.2–5.8)
RBC # BLD: 2.95 M/UL — LOW (ref 4.2–5.8)
RBC # FLD: 16.8 % — HIGH (ref 10.3–14.5)
SAO2 % BLDA: 100 % — HIGH (ref 94–98)
SAO2 % BLDV: 62.8 % — LOW (ref 67–88)
SODIUM SERPL-SCNC: 133 MMOL/L — LOW (ref 135–145)
SPECIMEN SOURCE: SIGNIFICANT CHANGE UP
SPECIMEN SOURCE: SIGNIFICANT CHANGE UP
WBC # BLD: 19.63 K/UL — HIGH (ref 3.8–10.5)
WBC # BLD: 20.67 K/UL — HIGH (ref 3.8–10.5)
WBC # BLD: 25.35 K/UL — HIGH (ref 3.8–10.5)
WBC # FLD AUTO: 19.63 K/UL — HIGH (ref 3.8–10.5)
WBC # FLD AUTO: 20.67 K/UL — HIGH (ref 3.8–10.5)
WBC # FLD AUTO: 25.35 K/UL — HIGH (ref 3.8–10.5)

## 2023-05-28 PROCEDURE — 99232 SBSQ HOSP IP/OBS MODERATE 35: CPT

## 2023-05-28 RX ORDER — I.V. FAT EMULSION 20 G/100ML
0.7 EMULSION INTRAVENOUS
Qty: 50 | Refills: 0 | Status: DISCONTINUED | OUTPATIENT
Start: 2023-05-28 | End: 2023-05-28

## 2023-05-28 RX ORDER — ACETAMINOPHEN 500 MG
1000 TABLET ORAL ONCE
Refills: 0 | Status: COMPLETED | OUTPATIENT
Start: 2023-05-28 | End: 2023-05-28

## 2023-05-28 RX ORDER — ELECTROLYTE SOLUTION,INJ
1 VIAL (ML) INTRAVENOUS
Refills: 0 | Status: DISCONTINUED | OUTPATIENT
Start: 2023-05-28 | End: 2023-05-28

## 2023-05-28 RX ORDER — OXYCODONE HYDROCHLORIDE 5 MG/1
5 TABLET ORAL ONCE
Refills: 0 | Status: DISCONTINUED | OUTPATIENT
Start: 2023-05-28 | End: 2023-05-28

## 2023-05-28 RX ORDER — IOHEXOL 300 MG/ML
30 INJECTION, SOLUTION INTRAVENOUS ONCE
Refills: 0 | Status: COMPLETED | OUTPATIENT
Start: 2023-05-29 | End: 2023-05-29

## 2023-05-28 RX ADMIN — Medication 1 EACH: at 17:42

## 2023-05-28 RX ADMIN — I.V. FAT EMULSION 20.4 GM/KG/DAY: 20 EMULSION INTRAVENOUS at 17:43

## 2023-05-28 RX ADMIN — Medication 3 MILLILITER(S): at 16:37

## 2023-05-28 RX ADMIN — Medication 1 APPLICATION(S): at 13:04

## 2023-05-28 RX ADMIN — MEROPENEM 100 MILLIGRAM(S): 1 INJECTION INTRAVENOUS at 21:41

## 2023-05-28 RX ADMIN — DEXMEDETOMIDINE HYDROCHLORIDE IN 0.9% SODIUM CHLORIDE 3.5 MICROGRAM(S)/KG/HR: 4 INJECTION INTRAVENOUS at 01:00

## 2023-05-28 RX ADMIN — LACOSAMIDE 150 MILLIGRAM(S): 50 TABLET ORAL at 22:56

## 2023-05-28 RX ADMIN — CHLORHEXIDINE GLUCONATE 1 APPLICATION(S): 213 SOLUTION TOPICAL at 06:14

## 2023-05-28 RX ADMIN — CHLORHEXIDINE GLUCONATE 15 MILLILITER(S): 213 SOLUTION TOPICAL at 06:13

## 2023-05-28 RX ADMIN — PANTOPRAZOLE SODIUM 40 MILLIGRAM(S): 20 TABLET, DELAYED RELEASE ORAL at 06:13

## 2023-05-28 RX ADMIN — Medication 1000 MILLIGRAM(S): at 13:34

## 2023-05-28 RX ADMIN — DEXMEDETOMIDINE HYDROCHLORIDE IN 0.9% SODIUM CHLORIDE 3.5 MICROGRAM(S)/KG/HR: 4 INJECTION INTRAVENOUS at 17:34

## 2023-05-28 RX ADMIN — HEPARIN SODIUM 5000 UNIT(S): 5000 INJECTION INTRAVENOUS; SUBCUTANEOUS at 06:14

## 2023-05-28 RX ADMIN — HEPARIN SODIUM 5000 UNIT(S): 5000 INJECTION INTRAVENOUS; SUBCUTANEOUS at 13:35

## 2023-05-28 RX ADMIN — Medication 1 APPLICATION(S): at 23:48

## 2023-05-28 RX ADMIN — Medication 10 MILLIGRAM(S): at 13:36

## 2023-05-28 RX ADMIN — OXYCODONE HYDROCHLORIDE 5 MILLIGRAM(S): 5 TABLET ORAL at 18:34

## 2023-05-28 RX ADMIN — Medication 1 APPLICATION(S): at 17:43

## 2023-05-28 RX ADMIN — PANTOPRAZOLE SODIUM 40 MILLIGRAM(S): 20 TABLET, DELAYED RELEASE ORAL at 17:42

## 2023-05-28 RX ADMIN — OXYCODONE HYDROCHLORIDE 5 MILLIGRAM(S): 5 TABLET ORAL at 17:34

## 2023-05-28 RX ADMIN — MEROPENEM 100 MILLIGRAM(S): 1 INJECTION INTRAVENOUS at 06:14

## 2023-05-28 RX ADMIN — Medication 3 MILLILITER(S): at 06:39

## 2023-05-28 RX ADMIN — MEROPENEM 100 MILLIGRAM(S): 1 INJECTION INTRAVENOUS at 13:35

## 2023-05-28 RX ADMIN — CASPOFUNGIN ACETATE 260 MILLIGRAM(S): 7 INJECTION, POWDER, LYOPHILIZED, FOR SOLUTION INTRAVENOUS at 11:17

## 2023-05-28 RX ADMIN — Medication 400 MILLIGRAM(S): at 03:18

## 2023-05-28 RX ADMIN — LACOSAMIDE 150 MILLIGRAM(S): 50 TABLET ORAL at 12:21

## 2023-05-28 RX ADMIN — CHLORHEXIDINE GLUCONATE 15 MILLILITER(S): 213 SOLUTION TOPICAL at 19:03

## 2023-05-28 RX ADMIN — Medication 400 MILLIGRAM(S): at 13:04

## 2023-05-28 RX ADMIN — Medication 81 MILLIGRAM(S): at 12:21

## 2023-05-28 RX ADMIN — Medication 1000 MILLIGRAM(S): at 03:33

## 2023-05-28 RX ADMIN — Medication 1 APPLICATION(S): at 06:16

## 2023-05-28 RX ADMIN — Medication 1 APPLICATION(S): at 00:24

## 2023-05-28 RX ADMIN — HEPARIN SODIUM 5000 UNIT(S): 5000 INJECTION INTRAVENOUS; SUBCUTANEOUS at 21:40

## 2023-05-28 RX ADMIN — Medication 3 MILLILITER(S): at 09:52

## 2023-05-28 RX ADMIN — DEXMEDETOMIDINE HYDROCHLORIDE IN 0.9% SODIUM CHLORIDE 3.5 MICROGRAM(S)/KG/HR: 4 INJECTION INTRAVENOUS at 08:38

## 2023-05-28 RX ADMIN — Medication 400 MILLIGRAM(S): at 19:52

## 2023-05-28 RX ADMIN — Medication 3 MILLILITER(S): at 22:32

## 2023-05-28 NOTE — PROGRESS NOTE ADULT - SUBJECTIVE AND OBJECTIVE BOX
Interval Events:  Mn labs, hgb 7.3 (7.7). T 100.6, tylenol given. Made 50cc of urine. Am hgb 6.5 (7.3), 2u PRBC ordered.  Patient seen and examined at bedside.      Allergies    No Known Allergies    Intolerances        Vital Signs Last 24 Hrs  T(C): 37.1 (28 May 2023 09:00), Max: 38.2 (27 May 2023 15:00)  T(F): 98.8 (28 May 2023 09:00), Max: 100.8 (27 May 2023 15:00)  HR: 92 (28 May 2023 09:00) (89 - 130)  BP: 93/60 (27 May 2023 13:00) (93/60 - 93/60)  BP(mean): 72 (27 May 2023 13:00) (72 - 72)  RR: 22 (28 May 2023 09:00) (20 - 30)  SpO2: 100% (28 May 2023 09:00) (78% - 100%)    Parameters below as of 28 May 2023 09:00  Patient On (Oxygen Delivery Method): ventilator    O2 Concentration (%): 40    05-27 @ 07:01  -  05-28 @ 07:00  --------------------------------------------------------  IN: 2947 mL / OUT: 3518 mL / NET: -571 mL    05-28 @ 07:01 - 05-28 @ 09:38  --------------------------------------------------------  IN: 90.5 mL / OUT: 180 mL / NET: -89.5 mL      05-27 @ 07:01 - 05-28 @ 07:00  --------------------------------------------------------  IN: 2947 mL / OUT: 3518 mL / NET: -571 mL    05-28 @ 07:01  -  05-28 @ 09:38  --------------------------------------------------------  IN: 90.5 mL / OUT: 180 mL / NET: -89.5 mL        Physical Exam:     Gen: NAD well nourished  Neuro: A&OX3 No deficits  CV:RRR Reg s1s2 noM  Pulm: CTA b/l No w/r/r  Abd: Soft NT ND + BS  Ext: No C/C/E   Vasc: + DP b/l   Skin: no rashes noted  MSK: No joint swelling  Psych: No signs of anxiety or depression      LABS:  ABG - ( 28 May 2023 05:13 )  pH, Arterial: 7.44  pH, Blood: x     /  pCO2: 34    /  pO2: 107   / HCO3: 23    / Base Excess: -0.5  /  SaO2: 100.0               CBC Full  -  ( 28 May 2023 05:11 )  WBC Count : 20.67 K/uL  RBC Count : 2.05 M/uL  Hemoglobin : 6.5 g/dL  Hematocrit : 18.8 %  Platelet Count - Automated : 66 K/uL  Mean Cell Volume : 91.7 fl  Mean Cell Hemoglobin : 31.7 pg  Mean Cell Hemoglobin Concentration : 34.6 gm/dL  Auto Neutrophil # : x  Auto Lymphocyte # : x  Auto Monocyte # : x  Auto Eosinophil # : x  Auto Basophil # : x  Auto Neutrophil % : x  Auto Lymphocyte % : x  Auto Monocyte % : x  Auto Eosinophil % : x  Auto Basophil % : x    05-28    133<L>  |  102  |  32<H>  ----------------------------<  153<H>  3.9   |  22  |  0.81    Ca    8.1<L>      28 May 2023 05:11  Phos  3.6     05-28  Mg     2.5     05-28    TPro  4.9<L>  /  Alb  2.1<L>  /  TBili  4.4<H>  /  DBili  3.5<H>  /  AST  32  /  ALT  15  /  AlkPhos  269<H>  05-28    PT/INR - ( 28 May 2023 05:11 )   PT: 15.1 sec;   INR: 1.27          PTT - ( 28 May 2023 05:11 )  PTT:34.3 sec                RADIOLOGY & ADDITIONAL STUDIES (The following images were personally reviewed):          A/p: 53yMale Interval Events:  Mn labs, hgb 7.3 (7.7). T 100.6, tylenol given. Made 50cc of urine. Am hgb 6.5 (7.3), 2u PRBC ordered.  Patient seen and examined at bedside.      Allergies    No Known Allergies    Intolerances        Vital Signs Last 24 Hrs  T(C): 37.1 (28 May 2023 09:00), Max: 38.2 (27 May 2023 15:00)  T(F): 98.8 (28 May 2023 09:00), Max: 100.8 (27 May 2023 15:00)  HR: 92 (28 May 2023 09:00) (89 - 130)  BP: 93/60 (27 May 2023 13:00) (93/60 - 93/60)  BP(mean): 72 (27 May 2023 13:00) (72 - 72)  RR: 22 (28 May 2023 09:00) (20 - 30)  SpO2: 100% (28 May 2023 09:00) (78% - 100%)    Parameters below as of 28 May 2023 09:00  Patient On (Oxygen Delivery Method): ventilator    O2 Concentration (%): 40    05-27 @ 07:01  -  05-28 @ 07:00  --------------------------------------------------------  IN: 2947 mL / OUT: 3518 mL / NET: -571 mL    05-28 @ 07:01 - 05-28 @ 09:38  --------------------------------------------------------  IN: 90.5 mL / OUT: 180 mL / NET: -89.5 mL      05-27 @ 07:01 - 05-28 @ 07:00  --------------------------------------------------------  IN: 2947 mL / OUT: 3518 mL / NET: -571 mL    05-28 @ 07:01  -  05-28 @ 09:38  --------------------------------------------------------  IN: 90.5 mL / OUT: 180 mL / NET: -89.5 mL        Physical Exam:     Gen: NAD well nourished  Neuro: Sedated intubated.   CV:RRR Reg s1s2 no M  Pulm: CTA b/l No w/r/r Decreased BS in Left lung base.   Abd: Soft NT mildly distended Multiple drains in place Ascites drains serous all other drains SS.    Ext: No C/C/ + 2+ pitting edema throughout  Vasc: + DP b/l   Skin: no rashes noted  MSK: No joint swelling  Psych: Unable to asses      LABS:  ABG - ( 28 May 2023 05:13 )  pH, Arterial: 7.44  pH, Blood: x     /  pCO2: 34    /  pO2: 107   / HCO3: 23    / Base Excess: -0.5  /  SaO2: 100.0               CBC Full  -  ( 28 May 2023 05:11 )  WBC Count : 20.67 K/uL  RBC Count : 2.05 M/uL  Hemoglobin : 6.5 g/dL  Hematocrit : 18.8 %  Platelet Count - Automated : 66 K/uL  Mean Cell Volume : 91.7 fl  Mean Cell Hemoglobin : 31.7 pg  Mean Cell Hemoglobin Concentration : 34.6 gm/dL  Auto Neutrophil # : x  Auto Lymphocyte # : x  Auto Monocyte # : x  Auto Eosinophil # : x  Auto Basophil # : x  Auto Neutrophil % : x  Auto Lymphocyte % : x  Auto Monocyte % : x  Auto Eosinophil % : x  Auto Basophil % : x    05-28    133<L>  |  102  |  32<H>  ----------------------------<  153<H>  3.9   |  22  |  0.81    Ca    8.1<L>      28 May 2023 05:11  Phos  3.6     05-28  Mg     2.5     05-28    TPro  4.9<L>  /  Alb  2.1<L>  /  TBili  4.4<H>  /  DBili  3.5<H>  /  AST  32  /  ALT  15  /  AlkPhos  269<H>  05-28    PT/INR - ( 28 May 2023 05:11 )   PT: 15.1 sec;   INR: 1.27          PTT - ( 28 May 2023 05:11 )  PTT:34.3 sec                RADIOLOGY & ADDITIONAL STUDIES (The following images were personally reviewed):          A/p: 53yMale w/ PMHx of HTN, type A aortic dissection s/p Dacron grafts, AV resuspension in 2013, CAD s/p CABG x 1 SVG to RCA (5/2013 with Dr. Medina), seizure disorder, recent admission 3/20-4/14 for replacement of transverse aortic arch, second stage TEVAR and aorto-axillary bypass, AV replacement (bio 23mm), and CABG x 1 (SVG-RCA). Pt found to have endo leak and taken to OR for TEVAR revision on 5/5, Post op course c/b Klebsiella bacteremia (5/15), resp failure requiring intubation on 5/18, Mucus plugging s/p Bronch 5/19 and PEA arrest. Post operatively pt also found to have hemorrhagic pancreatitis with venu-pancreatic abscess possibly 2* to Depakote therefore s/p IR aspiration of peripancreatic fluid collection and paracentesis on 5/22, IR venu-pancreatic abscess drainage and placement of drainage catheter on 5/24. Pt then transferred from CTICU to SICU for further mgmt of hemorrhagic pancreatitis on 5/25. s/p IR placement of 2 new drainage catheters and catheter exchange on 5/26.       NEURO: Precedex/Fentanyl drip. Hx seizures: s/p epilepsy consult, Cont vimpat,. Depakote weaned off due to concerns for drug-related hemorrhagic pancreatitis.   CV: s/p PEA arrest on 5/24 night, Septic shock: On Levophed for MAP> 65 . Echo from 5/19 hyperdynamic LV, SHAJI with LVOTO (gradient 26), normal RV, mild MR, no pulm htn. Cont ASA 81mg.   PULM: intubated 5/13 on AC 40/500/22/8, ARDS now improved off NO, s/p multiple Mucus plug/ Lung collapse s/p bronch x3 (most recently on 5/26) most likely from outward obstruction: Cont rotating pt around the clock. Cont Duonebs, & Chest PT q4. CPAP trial 40%/8/8 daily.  GI/FEN: NPO on TPN/Lipids, Trickle feeds Nepro @10. protonix BID, Hemorrhagic pancreatitis: s/p Multiple Drain placements and paracentisis by IR team.  Fecal Occult blood (P) BM. Constipation: Cont Dulcolax.   : TREY on CVVHD. Will attempt to remove fluid today for goal 500-1L negative if BP allows. Nephro following. Cantrell removed on 5/26- making 100cc of urine over 48 hrs. Bladder scan daily.   HEME: Acute blood loss anemia on 5/12 requiring 11units of PRBC. IR negative for mesenteric extrav. Hgb drifting down to 6.5 this am ordered 2UPRBC and f/u Post transfusion CBC.    ENDO: mISS  ID: Chloe (5/21-) Caspo (5/23-) ID following. Kleb bacteremia from 5/15 & 5/17, DC: Ampicillin ( 5/21- 5/27) subsequent bld cx negative. PNA w/ bronch cx kleb, enterobacter (zosyn, erta resistant), E faecalis, strep angionosus from 5/19, pancreatic abscess cx pan-sensitive kleb 5/22.   PPX: SCDs, SQH.   LINES: R Ax kalpana(5/12-) RIJ HD cath (5/22--), [facility] TLC (5/23--) Dc:  [facility] TLC (5/22-5/27),   WOUNDS/DRAINS: left venu-pancreatic abscess CODIE drain x 2, ascites CODIE x 2

## 2023-05-28 NOTE — PROGRESS NOTE ADULT - ASSESSMENT
54 y/o Male w/ PMHx of HTN, type A aortic dissection (s/p Dacron grafts, AV resuspension in 2013, CAD s/p CABG x 1 SVG to RCA 5/2013 with Dr. Medina), seizure disorder, recent admission 3/20-4/14 for replacement of transverse aortic arch, second stage TEVAR and aorto-axillary bypass, AV replacement (bio 23mm), and CABG x 1 (SVG-RCA) EF 75% ([place], 3/20). Post op c/b severe cardiogenic and vasogenic shock, TREY requiring CVVHD and temporary dialysis, discharged home mid April. Pt then presented to [facility] ED (4/27-4/30) for syncope vs. seizure episode at home. Negative neuro work up for Seizures AED dose adjusted. Presented to [place] on 5/4 for abdominal pain, imaging revealed recent graft endoleak, transferred to [facility] on 5/5 for surgical mgmt. Pt taken to OR for Second stage TEVAR on 5/5, post op course c/b Klebsiella bacteremia (5/15), resp failure requiring intubation on 5/18, Bronch 5/19 with cx growing kleb, enterobacter (zosyn, erta resistant), E faecalis, strep angionosus, hemorrhagic pancreatitis of unclear etiology with venu-pancreatic abscess (per CT A/P from 5/8 and 5/13) s/p IR aspiration of peripancreatic fluid collection (15cc sanguinous pansensitive kleb 5/22) and paracentesis (4L clear yellow fluid, ascites cx negative) on 5/22, IR venu-pancreatic abscess drainage and placement of drainage catheter on 5/24, as well as PEA arrest on. Pt now transferred from CTICU to SICU for further mgmt of hemorrhagic pancreatitis.    Plan:  s/p TEVAR   -pt was transferred to SICU for further management of hemorrhagic pancreatitis  -continue ASA   -continue to monitor BP and HR  -remainder of care of pancreatitis per primary team   -CT surgery will continue to follow peripherally

## 2023-05-28 NOTE — PROGRESS NOTE ADULT - ASSESSMENT
IMPRESSION:  53 year old male hx seizure disorder, aortic dissection s/p AVR, aortic graft revision 3/20/2023, second stage TEVAR 5/5/2023, course c/b peripancreatic/RP hemorrhage/hematoma formation.     Recommend:  1.  Continue Meropenem 1 gram IV q8hrs  2. Continue Caspofungin 50 mg IV daily  3.  Follow pending cultures    ID team 1 will follow

## 2023-05-28 NOTE — PROGRESS NOTE ADULT - SUBJECTIVE AND OBJECTIVE BOX
INTERVAL HPI/OVERNIGHT EVENTS:    Patient was seen and examined at bedside. Off pressors.  Afebrile. On CVVHD    ROS:    unable to obtain         ANTIBIOTICS/RELEVANT:    MEDICATIONS  (STANDING):  albuterol/ipratropium for Nebulization 3 milliLiter(s) Nebulizer every 6 hours  aspirin  chewable 81 milliGRAM(s) Oral daily  bisacodyl Suppository 10 milliGRAM(s) Rectal daily  caspofungin IVPB 50 milliGRAM(s) IV Intermittent every 24 hours  caspofungin IVPB      chlorhexidine 0.12% Liquid 15 milliLiter(s) Oral Mucosa every 12 hours  chlorhexidine 2% Cloths 1 Application(s) Topical daily  CRRT Treatment    <Continuous>  dexMEDEtomidine Infusion 0.2 MICROgram(s)/kG/Hr (3.5 mL/Hr) IV Continuous <Continuous>  dextrose 5%. 1000 milliLiter(s) (100 mL/Hr) IV Continuous <Continuous>  dextrose 5%. 1000 milliLiter(s) (50 mL/Hr) IV Continuous <Continuous>  dextrose 50% Injectable 25 Gram(s) IV Push once  dextrose 50% Injectable 12.5 Gram(s) IV Push once  dextrose 50% Injectable 25 Gram(s) IV Push once  fentaNYL   Infusion... 0.5 MICROgram(s)/kG/Hr (1.75 mL/Hr) IV Continuous <Continuous>  glucagon  Injectable 1 milliGRAM(s) IntraMuscular once  heparin   Injectable 5000 Unit(s) SubCutaneous every 8 hours  insulin lispro (ADMELOG) corrective regimen sliding scale   SubCutaneous every 6 hours  lacosamide IVPB 250 milliGRAM(s) IV Intermittent every 12 hours  lipid, fat emulsion (Fish Oil and Plant Based) 20% Infusion 0.7 Gm/kG/Day (20.4 mL/Hr) IV Continuous <Continuous>  meropenem  IVPB 1000 milliGRAM(s) IV Intermittent every 8 hours  norepinephrine Infusion 0.05 MICROgram(s)/kG/Min (3.28 mL/Hr) IV Continuous <Continuous>  pantoprazole  Injectable 40 milliGRAM(s) IV Push two times a day  Parenteral Nutrition - Adult 1 Each (70 mL/Hr) TPN Continuous <Continuous>  Parenteral Nutrition - Adult 1 Each (70 mL/Hr) TPN Continuous <Continuous>  petrolatum Ophthalmic Ointment 1 Application(s) Both EYES every 6 hours  Phoxillum Filtration BK 4 / 2.5 5000 milliLiter(s) (2000 mL/Hr) CRRT <Continuous>    MEDICATIONS  (PRN):  dextrose Oral Gel 15 Gram(s) Oral once PRN Blood Glucose LESS THAN 70 milliGRAM(s)/deciliter        Vital Signs Last 24 Hrs  T(C): 37.1 (28 May 2023 09:00), Max: 38.2 (27 May 2023 15:00)  T(F): 98.8 (28 May 2023 09:00), Max: 100.8 (27 May 2023 15:00)  HR: 104 (28 May 2023 13:00) (89 - 130)  BP: --  BP(mean): --  RR: 24 (28 May 2023 13:00) (20 - 30)  SpO2: 100% (28 May 2023 13:00) (92% - 100%)    Parameters below as of 28 May 2023 13:00  Patient On (Oxygen Delivery Method): ventilator    O2 Concentration (%): 40    PHYSICAL EXAM:  Constitutional: non-toxic, no distress  Eyes:IVAN, EOMI  Ear/Nose/Throat: no oral lesion, no sinus tenderness on percussion	  Neck:  supple  Respiratory: CTA marti  Cardiovascular: S1S2 RRR, no murmurs  Gastrointestinal:soft, (+) BS, no HSM  Extremities:no e/e/c  Vascular: DP Pulse:	right normal; left normal      LABS:                        6.5    20.67 )-----------( 66       ( 28 May 2023 05:11 )             18.8     05-28    133<L>  |  103  |  28<H>  ----------------------------<  116<H>  4.3   |  22  |  0.75    Ca    7.8<L>      28 May 2023 11:10  Phos  4.2     05-28  Mg     2.3     05-28    TPro  4.9<L>  /  Alb  2.1<L>  /  TBili  4.4<H>  /  DBili  3.5<H>  /  AST  32  /  ALT  15  /  AlkPhos  269<H>  05-28    PT/INR - ( 28 May 2023 05:11 )   PT: 15.1 sec;   INR: 1.27          PTT - ( 28 May 2023 05:11 )  PTT:34.3 sec      MICROBIOLOGY:    RADIOLOGY & ADDITIONAL STUDIES:

## 2023-05-28 NOTE — PROGRESS NOTE ADULT - ASSESSMENT
This is a 52yo Male pt with PMH HTN, type A aortic dissection s/p Dacron grafts, AV resuspension in 2013, CAD s/p CABG x 1 SVG to RCA (5/2013 with Dr. Medina), seizure disorder (last episode on 7/4/22) S/P replacement of transverse aortic arch, second stage thoracic endovascular aortic repair, aorto-axillary bypass, AV replacement (bio 23mm), in APr 2023 and CABG x 1 (SVG-RCA) EF 75% ([place]acio, 3/20) now s/p 2nd state TEVAR on 5/5 for whom surgery was consulted for finding of acute pancreatitis with large peripancreatic/perigastric fluid collections on CTA abdomen 5/8 then acute hemorrhage starting 5/12/23 requiring 11 U pRBC but with negative mesenteric angiogram 5/13/23 for whom hepatobiliary surgery was reconsulted for possible hemorrhagic pancreatitis, now with 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     - Wean pressors as appropriate  - CPAP trial and extubate when appropriate  - CVVH per SICU  - C/w Abx - ampicillin, meropenem, caspofungin  - Care per SICU   This is a 54yo Male pt with PMH HTN, type A aortic dissection s/p Dacron grafts, AV resuspension in 2013, CAD s/p CABG x 1 SVG to RCA (5/2013 with Dr. Medina), seizure disorder (last episode on 7/4/22) S/P replacement of transverse aortic arch, second stage thoracic endovascular aortic repair, aorto-axillary bypass, AV replacement (bio 23mm), in APr 2023 and CABG x 1 (SVG-RCA) EF 75% (Ignacio, 3/20) now s/p 2nd state TEVAR on 5/5 for whom surgery was consulted for finding of acute pancreatitis with large peripancreatic/perigastric fluid collections on CTA abdomen 5/8 then acute hemorrhage starting 5/12/23 requiring 11 U pRBC but with negative mesenteric angiogram 5/13/23 for whom hepatobiliary surgery was reconsulted for possible hemorrhagic pancreatitis, now with 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     - Plan for repeat CT 5/29  - Wean pressors as appropriate  - CPAP trial and extubate when appropriate  - CVVH per SICU  - C/w Abx - ampicillin, meropenem, caspofungin  - Care per SICU

## 2023-05-28 NOTE — PROGRESS NOTE ADULT - ASSESSMENT
53 YO Male, HTN aortic dissection previous admission with severe cardiogenic shock and oliguric TREY requiring CVVHD. Presented to the ED with worsening epigastric pain CTA/P revealed continued endoleak hospital course complicated by necrotic pancreatitis      #oliguric ATN 2/2 cardiogenic shock  #necrotizing pancreatitis    recommend:  c/w CVVHD for UF and clearance   will c/w phoxillum as phos low-normal  Qb 300ml/min DFR 2L/hour UF, net neg 20ml/hr to a goal of net neg 500ml/day  q8H BMP while on CVVHD  Check phos daily  maintain MAP > 70 for adequate renal perfusion  strict i's and o's  renally dose abx egfr <15  Will recommend avoiding IV Contrast to prevent further renal insult as pt still oliguric, requiring CVVHD support      Discussed with primary team

## 2023-05-28 NOTE — PROGRESS NOTE ADULT - SUBJECTIVE AND OBJECTIVE BOX
SUBJECTIVE: Patient seen and examined bedside by chief resident. Patient in bed intubated and sedated. Appears comfortable. No meaningful interaction.    aspirin  chewable 81 milliGRAM(s) Oral daily  caspofungin IVPB 50 milliGRAM(s) IV Intermittent every 24 hours  caspofungin IVPB      heparin   Injectable 5000 Unit(s) SubCutaneous every 8 hours  meropenem  IVPB 1000 milliGRAM(s) IV Intermittent every 8 hours  norepinephrine Infusion 0.05 MICROgram(s)/kG/Min IV Continuous <Continuous>      Vital Signs Last 24 Hrs  T(C): 37.4 (28 May 2023 06:00), Max: 38.2 (27 May 2023 15:00)  T(F): 99.3 (28 May 2023 06:00), Max: 100.8 (27 May 2023 15:00)  HR: 99 (28 May 2023 07:00) (95 - 130)  BP: 93/60 (27 May 2023 13:00) (93/60 - 93/60)  BP(mean): 72 (27 May 2023 13:00) (72 - 72)  RR: 23 (28 May 2023 07:00) (20 - 30)  SpO2: 100% (28 May 2023 07:00) (78% - 100%)    Parameters below as of 28 May 2023 07:00  Patient On (Oxygen Delivery Method): ventilator    O2 Concentration (%): 40  I&O's Detail    27 May 2023 07:01  -  28 May 2023 07:00  --------------------------------------------------------  IN:    Dexmedetomidine: 162.8 mL    Fat Emulsion (Fish Oil &amp; Plant Based) 20% Infusion: 249.6 mL    FentaNYL: 68.3 mL    IV PiggyBack: 250 mL    IV PiggyBack: 50 mL    IV PiggyBack: 100 mL    IV PiggyBack: 100 mL    IV PiggyBack: 150 mL    Norepinephrine: 90.1 mL    Norepinephrine: 90.9 mL    Propofol: 25.3 mL    TPN (Total Parenteral Nutrition): 1610 mL  Total IN: 2947 mL    OUT:    Bulb (mL): 45 mL    Bulb (mL): 20 mL    Drain (mL): 50 mL    Drain (mL): 15 mL    Drain (mL): 50 mL    Drain (mL): 115 mL    Drain (mL): 315 mL    Incontinent per Condom Catheter (mL): 55 mL    Other (mL): 2641 mL    Voided (mL): 0 mL  Total OUT: 3306 mL    Total NET: -359 mL      Physical Exam  Constitutional: Intubated/Sedated on Precedex RASS -5  Pulm: intubated, equal chest rise, no respiratory distress  CV: Regular rate and rhythm  Abd:  soft, moderately distended. Ascites drain - brown/red, thick; hematoma drain - red/black; pancreatic drain - red/black;  Extremities: no edema  Drains: Cantrell      LABS:                        6.5    20.67 )-----------( 66       ( 28 May 2023 05:11 )             18.8     05-28    133<L>  |  102  |  32<H>  ----------------------------<  153<H>  3.9   |  22  |  0.81    Ca    8.1<L>      28 May 2023 05:11  Phos  3.6     05-28  Mg     2.5     05-28    TPro  4.9<L>  /  Alb  2.1<L>  /  TBili  4.4<H>  /  DBili  3.5<H>  /  AST  32  /  ALT  15  /  AlkPhos  269<H>  05-28    PT/INR - ( 28 May 2023 05:11 )   PT: 15.1 sec;   INR: 1.27          PTT - ( 28 May 2023 05:11 )  PTT:34.3 sec      RADIOLOGY & ADDITIONAL STUDIES:

## 2023-05-28 NOTE — PROGRESS NOTE ADULT - SUBJECTIVE AND OBJECTIVE BOX
Patient is a 53y Male seen and evaluated at bedside. Laying in bed. Remains intubated, sedated. CVVHD running without any complications       Meds:    albuterol/ipratropium for Nebulization 3 every 6 hours  aspirin  chewable 81 daily  bisacodyl Suppository 10 daily  caspofungin IVPB 50 every 24 hours  caspofungin IVPB    chlorhexidine 0.12% Liquid 15 every 12 hours  chlorhexidine 2% Cloths 1 daily  CRRT Treatment  <Continuous>  dexMEDEtomidine Infusion 0.2 <Continuous>  dextrose 5%. 1000 <Continuous>  dextrose 5%. 1000 <Continuous>  dextrose 50% Injectable 25 once  dextrose 50% Injectable 12.5 once  dextrose 50% Injectable 25 once  dextrose Oral Gel 15 once PRN  fentaNYL   Infusion... 0.5 <Continuous>  glucagon  Injectable 1 once  heparin   Injectable 5000 every 8 hours  insulin lispro (ADMELOG) corrective regimen sliding scale  every 6 hours  lacosamide IVPB 250 every 12 hours  lipid, fat emulsion (Fish Oil and Plant Based) 20% Infusion 0.7 <Continuous>  lipid, fat emulsion (Fish Oil and Plant Based) 20% Infusion 0.7 <Continuous>  meropenem  IVPB 1000 every 8 hours  norepinephrine Infusion 0.05 <Continuous>  pantoprazole  Injectable 40 two times a day  Parenteral Nutrition - Adult 1 <Continuous>  Parenteral Nutrition - Adult 1 <Continuous>  petrolatum Ophthalmic Ointment 1 every 6 hours  Phoxillum Filtration BK 4 / 2.5 5000 <Continuous>      T(C): , Max: 38.2 (05-27-23 @ 15:00)  T(F): , Max: 100.8 (05-27-23 @ 15:00)  HR: 92 (05-28-23 @ 09:00)  BP: 93/60 (05-27-23 @ 13:00)  BP(mean): 72 (05-27-23 @ 13:00)  RR: 22 (05-28-23 @ 09:00)  SpO2: 100% (05-28-23 @ 09:00)  Wt(kg): --    05-27 @ 07:01  -  05-28 @ 07:00  --------------------------------------------------------  IN: 2947 mL / OUT: 3518 mL / NET: -571 mL    05-28 @ 07:01  -  05-28 @ 09:55  --------------------------------------------------------  IN: 90.5 mL / OUT: 180 mL / NET: -89.5 mL          Review of Systems:  ROS negative except as per HPI      PHYSICAL EXAM:  GENERAL: intubated; sedated   CHEST/LUNG: mechanical breath sounds BL  HEART: normal S1S2, RRR  ABDOMEN: Soft, ND  EXTREMITIES: 2+ edema BL LE  SKIN: no lesions or rashes   ACCESS: R IJ S/Q HD cath     LABS:                        6.5    20.67 )-----------( 66       ( 28 May 2023 05:11 )             18.8     05-28    133<L>  |  102  |  32<H>  ----------------------------<  153<H>  3.9   |  22  |  0.81    Ca    8.1<L>      28 May 2023 05:11  Phos  3.6     05-28  Mg     2.5     05-28    TPro  4.9<L>  /  Alb  2.1<L>  /  TBili  4.4<H>  /  DBili  3.5<H>  /  AST  32  /  ALT  15  /  AlkPhos  269<H>  05-28      PT/INR - ( 28 May 2023 05:11 )   PT: 15.1 sec;   INR: 1.27          PTT - ( 28 May 2023 05:11 )  PTT:34.3 sec          RADIOLOGY & ADDITIONAL STUDIES:

## 2023-05-28 NOTE — PROGRESS NOTE ADULT - SUBJECTIVE AND OBJECTIVE BOX
OPERATION & DATE:  5/5: second stage TEVAR  3/20: aorto-axillary bypass, AV replacement (bio 23mm), and CABG x 1 (SVG-RCA) EF 75%  2013: hx type A dissection and repair, CABG    SUBJECTIVE ASSESSMENT: unable to ascess, intubated/sedated     VITAL SIGNS:  Vital Signs Last 24 Hrs  T(C): 36.7 (28 May 2023 16:00), Max: 37.8 (28 May 2023 01:00)  T(F): 98.1 (28 May 2023 16:00), Max: 100 (28 May 2023 01:00)  HR: 87 (28 May 2023 16:00) (81 - 124)  BP: --  BP(mean): --  RR: 22 (28 May 2023 16:00) (18 - 30)  SpO2: 100% (28 May 2023 16:00) (92% - 100%)    Parameters below as of 28 May 2023 16:00  Patient On (Oxygen Delivery Method): ventilator, cpap      I&O's Detail    27 May 2023 07:01  -  28 May 2023 07:00  --------------------------------------------------------  IN:    Dexmedetomidine: 162.8 mL    Fat Emulsion (Fish Oil &amp; Plant Based) 20% Infusion: 249.6 mL    FentaNYL: 68.3 mL    IV PiggyBack: 150 mL    IV PiggyBack: 100 mL    IV PiggyBack: 100 mL    IV PiggyBack: 50 mL    IV PiggyBack: 250 mL    Norepinephrine: 90.9 mL    Norepinephrine: 90.1 mL    Propofol: 25.3 mL    TPN (Total Parenteral Nutrition): 1610 mL  Total IN: 2947 mL    OUT:    Bulb (mL): 45 mL    Bulb (mL): 20 mL    Drain (mL): 50 mL    Drain (mL): 15 mL    Drain (mL): 50 mL    Drain (mL): 115 mL    Drain (mL): 315 mL    Incontinent per Condom Catheter (mL): 55 mL    Nasogastric/Oral tube (mL): 100 mL    Other (mL): 2753 mL    Voided (mL): 0 mL  Total OUT: 3518 mL    Total NET: -571 mL      28 May 2023 07:01  -  28 May 2023 16:54  --------------------------------------------------------  IN:    Dexmedetomidine: 130.2 mL    Enteral Tube Flush: 30 mL    FentaNYL: 3.5 mL    IV PiggyBack: 50 mL    IV PiggyBack: 50 mL    IV PiggyBack: 250 mL    Norepinephrine: 45.1 mL    PRBCs (Packed Red Blood Cells): 600 mL    TPN (Total Parenteral Nutrition): 700 mL  Total IN: 1858.8 mL    OUT:    Bulb (mL): 20 mL    Bulb (mL): 5 mL    Drain (mL): 50 mL    Drain (mL): 15 mL    Drain (mL): 20 mL    Drain (mL): 15 mL    Drain (mL): 0 mL    Fat Emulsion (Fish Oil &amp; Plant Based) 20% Infusion: 0 mL    Intermittent Catheterization - Urethral (mL): 50 mL    Other (mL): 1528 mL  Total OUT: 1703 mL    Total NET: 155.8 mL    PHYSICAL EXAM:  General: intubated, sedated   HEENT: normocephalic   Cardio: normal s1/s2  Pulm: b/l crackles  GI: soft, distended   Extremities: pitting edema 2+ b/l  Vascular: 2+ DP b/l  Incisions: healing well     LABS:                        6.5    20.67 )-----------( 66       ( 28 May 2023 05:11 )             18.8     PT/INR - ( 28 May 2023 05:11 )   PT: 15.1 sec;   INR: 1.27     PTT - ( 28 May 2023 05:11 )  PTT:34.3 sec    05-28    133<L>  |  103  |  28<H>  ----------------------------<  116<H>  4.3   |  22  |  0.75    Ca    7.8<L>      28 May 2023 11:10  Phos  4.2     05-28  Mg     2.3     05-28    TPro  4.9<L>  /  Alb  2.1<L>  /  TBili  4.4<H>  /  DBili  3.5<H>  /  AST  32  /  ALT  15  /  AlkPhos  269<H>  05-28      MEDICATIONS  (STANDING):  albuterol/ipratropium for Nebulization 3 milliLiter(s) Nebulizer every 6 hours  aspirin  chewable 81 milliGRAM(s) Oral daily  bisacodyl Suppository 10 milliGRAM(s) Rectal daily  caspofungin IVPB 50 milliGRAM(s) IV Intermittent every 24 hours  caspofungin IVPB      chlorhexidine 0.12% Liquid 15 milliLiter(s) Oral Mucosa every 12 hours  chlorhexidine 2% Cloths 1 Application(s) Topical daily  dexMEDEtomidine Infusion 0.2 MICROgram(s)/kG/Hr (3.5 mL/Hr) IV Continuous <Continuous>  dextrose 5%. 1000 milliLiter(s) (50 mL/Hr) IV Continuous <Continuous>  dextrose 5%. 1000 milliLiter(s) (100 mL/Hr) IV Continuous <Continuous>  dextrose 50% Injectable 25 Gram(s) IV Push once  dextrose 50% Injectable 25 Gram(s) IV Push once  dextrose 50% Injectable 12.5 Gram(s) IV Push once  fentaNYL   Infusion... 0.5 MICROgram(s)/kG/Hr (1.75 mL/Hr) IV Continuous <Continuous>  glucagon  Injectable 1 milliGRAM(s) IntraMuscular once  heparin   Injectable 5000 Unit(s) SubCutaneous every 8 hours  insulin lispro (ADMELOG) corrective regimen sliding scale   SubCutaneous every 6 hours  lacosamide IVPB 250 milliGRAM(s) IV Intermittent every 12 hours  lipid, fat emulsion (Fish Oil and Plant Based) 20% Infusion 0.7 Gm/kG/Day (20.4 mL/Hr) IV Continuous <Continuous>  meropenem  IVPB 1000 milliGRAM(s) IV Intermittent every 8 hours  norepinephrine Infusion 0.05 MICROgram(s)/kG/Min (3.28 mL/Hr) IV Continuous <Continuous>  pantoprazole  Injectable 40 milliGRAM(s) IV Push two times a day  Parenteral Nutrition - Adult 1 Each (70 mL/Hr) TPN Continuous <Continuous>  Parenteral Nutrition - Adult 1 Each (70 mL/Hr) TPN Continuous <Continuous>  petrolatum Ophthalmic Ointment 1 Application(s) Both EYES every 6 hours    MEDICATIONS  (PRN):  dextrose Oral Gel 15 Gram(s) Oral once PRN Blood Glucose LESS THAN 70 milliGRAM(s)/deciliter    RADIOLOGY & ADDITIONAL TESTS:     OPERATION & DATE:  5/5: second stage TEVAR  3/20: aorto-axillary bypass, AV replacement (bio 23mm), and CABG x 1 (SVG-RCA) EF 75%  2013: hx type A dissection and repair, CABG    SUBJECTIVE ASSESSMENT: unable to ascess, intubated/sedated     VITAL SIGNS:  Vital Signs Last 24 Hrs  T(C): 36.7 (28 May 2023 16:00), Max: 37.8 (28 May 2023 01:00)  T(F): 98.1 (28 May 2023 16:00), Max: 100 (28 May 2023 01:00)  HR: 87 (28 May 2023 16:00) (81 - 124)  BP: --  BP(mean): --  RR: 22 (28 May 2023 16:00) (18 - 30)  SpO2: 100% (28 May 2023 16:00) (92% - 100%)    Parameters below as of 28 May 2023 16:00  Patient On (Oxygen Delivery Method): ventilator, cpap      I&O's Detail    27 May 2023 07:01  -  28 May 2023 07:00  --------------------------------------------------------  IN:    Dexmedetomidine: 162.8 mL    Fat Emulsion (Fish Oil &amp; Plant Based) 20% Infusion: 249.6 mL    FentaNYL: 68.3 mL    IV PiggyBack: 150 mL    IV PiggyBack: 100 mL    IV PiggyBack: 100 mL    IV PiggyBack: 50 mL    IV PiggyBack: 250 mL    Norepinephrine: 90.9 mL    Norepinephrine: 90.1 mL    Propofol: 25.3 mL    TPN (Total Parenteral Nutrition): 1610 mL  Total IN: 2947 mL    OUT:    Bulb (mL): 45 mL    Bulb (mL): 20 mL    Drain (mL): 50 mL    Drain (mL): 15 mL    Drain (mL): 50 mL    Drain (mL): 115 mL    Drain (mL): 315 mL    Incontinent per Condom Catheter (mL): 55 mL    Nasogastric/Oral tube (mL): 100 mL    Other (mL): 2753 mL    Voided (mL): 0 mL  Total OUT: 3518 mL    Total NET: -571 mL      28 May 2023 07:01  -  28 May 2023 16:54  --------------------------------------------------------  IN:    Dexmedetomidine: 130.2 mL    Enteral Tube Flush: 30 mL    FentaNYL: 3.5 mL    IV PiggyBack: 50 mL    IV PiggyBack: 50 mL    IV PiggyBack: 250 mL    Norepinephrine: 45.1 mL    PRBCs (Packed Red Blood Cells): 600 mL    TPN (Total Parenteral Nutrition): 700 mL  Total IN: 1858.8 mL    OUT:    Bulb (mL): 20 mL    Bulb (mL): 5 mL    Drain (mL): 50 mL    Drain (mL): 15 mL    Drain (mL): 20 mL    Drain (mL): 15 mL    Drain (mL): 0 mL    Fat Emulsion (Fish Oil &amp; Plant Based) 20% Infusion: 0 mL    Intermittent Catheterization - Urethral (mL): 50 mL    Other (mL): 1528 mL  Total OUT: 1703 mL    Total NET: 155.8 mL    PHYSICAL EXAM:  General: intubated, sedated   HEENT: normocephalic   Cardio: normal s1/s2  Pulm: b/l crackles  GI: soft, distended   Extremities: pitting edema 2+ b/l  Vascular: 2+ DP b/l  Incisions: healing well     LABS:                        6.5    20.67 )-----------( 66       ( 28 May 2023 05:11 )             18.8     PT/INR - ( 28 May 2023 05:11 )   PT: 15.1 sec;   INR: 1.27     PTT - ( 28 May 2023 05:11 )  PTT:34.3 sec    05-28    133<L>  |  103  |  28<H>  ----------------------------<  116<H>  4.3   |  22  |  0.75    Ca    7.8<L>      28 May 2023 11:10  Phos  4.2     05-28  Mg     2.3     05-28    TPro  4.9<L>  /  Alb  2.1<L>  /  TBili  4.4<H>  /  DBili  3.5<H>  /  AST  32  /  ALT  15  /  AlkPhos  269<H>  05-28      MEDICATIONS  (STANDING):  albuterol/ipratropium for Nebulization 3 milliLiter(s) Nebulizer every 6 hours  aspirin  chewable 81 milliGRAM(s) Oral daily  bisacodyl Suppository 10 milliGRAM(s) Rectal daily  caspofungin IVPB 50 milliGRAM(s) IV Intermittent every 24 hours  caspofungin IVPB      chlorhexidine 0.12% Liquid 15 milliLiter(s) Oral Mucosa every 12 hours  chlorhexidine 2% Cloths 1 Application(s) Topical daily  dexMEDEtomidine Infusion 0.2 MICROgram(s)/kG/Hr (3.5 mL/Hr) IV Continuous <Continuous>  dextrose 5%. 1000 milliLiter(s) (50 mL/Hr) IV Continuous <Continuous>  dextrose 5%. 1000 milliLiter(s) (100 mL/Hr) IV Continuous <Continuous>  dextrose 50% Injectable 25 Gram(s) IV Push once  dextrose 50% Injectable 25 Gram(s) IV Push once  dextrose 50% Injectable 12.5 Gram(s) IV Push once  fentaNYL   Infusion... 0.5 MICROgram(s)/kG/Hr (1.75 mL/Hr) IV Continuous <Continuous>  glucagon  Injectable 1 milliGRAM(s) IntraMuscular once  heparin   Injectable 5000 Unit(s) SubCutaneous every 8 hours  insulin lispro (ADMELOG) corrective regimen sliding scale   SubCutaneous every 6 hours  lacosamide IVPB 250 milliGRAM(s) IV Intermittent every 12 hours  lipid, fat emulsion (Fish Oil and Plant Based) 20% Infusion 0.7 Gm/kG/Day (20.4 mL/Hr) IV Continuous <Continuous>  meropenem  IVPB 1000 milliGRAM(s) IV Intermittent every 8 hours  norepinephrine Infusion 0.05 MICROgram(s)/kG/Min (3.28 mL/Hr) IV Continuous <Continuous>  pantoprazole  Injectable 40 milliGRAM(s) IV Push two times a day  Parenteral Nutrition - Adult 1 Each (70 mL/Hr) TPN Continuous <Continuous>  Parenteral Nutrition - Adult 1 Each (70 mL/Hr) TPN Continuous <Continuous>  petrolatum Ophthalmic Ointment 1 Application(s) Both EYES every 6 hours    MEDICATIONS  (PRN):  dextrose Oral Gel 15 Gram(s) Oral once PRN Blood Glucose LESS THAN 70 milliGRAM(s)/deciliter    RADIOLOGY & ADDITIONAL TESTS:  none

## 2023-05-29 LAB
-  AMPICILLIN/SULBACTAM: SIGNIFICANT CHANGE UP
-  AMPICILLIN: SIGNIFICANT CHANGE UP
-  CEFAZOLIN: SIGNIFICANT CHANGE UP
-  CEFTRIAXONE: SIGNIFICANT CHANGE UP
-  CIPROFLOXACIN: SIGNIFICANT CHANGE UP
-  ERTAPENEM: SIGNIFICANT CHANGE UP
-  GENTAMICIN: SIGNIFICANT CHANGE UP
-  PIPERACILLIN/TAZOBACTAM: SIGNIFICANT CHANGE UP
-  TOBRAMYCIN: SIGNIFICANT CHANGE UP
-  TRIMETHOPRIM/SULFAMETHOXAZOLE: SIGNIFICANT CHANGE UP
ALBUMIN SERPL ELPH-MCNC: 2.2 G/DL — LOW (ref 3.3–5)
ALP SERPL-CCNC: 347 U/L — HIGH (ref 40–120)
ALT FLD-CCNC: 13 U/L — SIGNIFICANT CHANGE UP (ref 10–45)
ANION GAP SERPL CALC-SCNC: 8 MMOL/L — SIGNIFICANT CHANGE UP (ref 5–17)
ANION GAP SERPL CALC-SCNC: 8 MMOL/L — SIGNIFICANT CHANGE UP (ref 5–17)
ANION GAP SERPL CALC-SCNC: 9 MMOL/L — SIGNIFICANT CHANGE UP (ref 5–17)
APTT BLD: 35.8 SEC — HIGH (ref 27.5–35.5)
AST SERPL-CCNC: 28 U/L — SIGNIFICANT CHANGE UP (ref 10–40)
BASE EXCESS BLDA CALC-SCNC: -0.2 MMOL/L — SIGNIFICANT CHANGE UP (ref -2–3)
BASE EXCESS BLDV CALC-SCNC: -0.2 MMOL/L — SIGNIFICANT CHANGE UP (ref -2–3)
BILIRUB DIRECT SERPL-MCNC: 2 MG/DL — HIGH (ref 0–0.3)
BILIRUB INDIRECT FLD-MCNC: 0.9 MG/DL — SIGNIFICANT CHANGE UP (ref 0.2–1)
BILIRUB SERPL-MCNC: 2.8 MG/DL — HIGH (ref 0.2–1.2)
BUN SERPL-MCNC: 29 MG/DL — HIGH (ref 7–23)
BUN SERPL-MCNC: 30 MG/DL — HIGH (ref 7–23)
BUN SERPL-MCNC: 31 MG/DL — HIGH (ref 7–23)
CA-I SERPL-SCNC: 1.18 MMOL/L — SIGNIFICANT CHANGE UP (ref 1.15–1.33)
CALCIUM SERPL-MCNC: 7.8 MG/DL — LOW (ref 8.4–10.5)
CALCIUM SERPL-MCNC: 8 MG/DL — LOW (ref 8.4–10.5)
CALCIUM SERPL-MCNC: 8 MG/DL — LOW (ref 8.4–10.5)
CHLORIDE SERPL-SCNC: 101 MMOL/L — SIGNIFICANT CHANGE UP (ref 96–108)
CHLORIDE SERPL-SCNC: 102 MMOL/L — SIGNIFICANT CHANGE UP (ref 96–108)
CHLORIDE SERPL-SCNC: 103 MMOL/L — SIGNIFICANT CHANGE UP (ref 96–108)
CK SERPL-CCNC: 33 U/L — SIGNIFICANT CHANGE UP (ref 30–200)
CO2 BLDA-SCNC: 24 MMOL/L — SIGNIFICANT CHANGE UP (ref 19–24)
CO2 BLDV-SCNC: 26.1 MMOL/L — HIGH (ref 22–26)
CO2 SERPL-SCNC: 21 MMOL/L — LOW (ref 22–31)
CO2 SERPL-SCNC: 22 MMOL/L — SIGNIFICANT CHANGE UP (ref 22–31)
CO2 SERPL-SCNC: 23 MMOL/L — SIGNIFICANT CHANGE UP (ref 22–31)
CREAT SERPL-MCNC: 0.75 MG/DL — SIGNIFICANT CHANGE UP (ref 0.5–1.3)
CREAT SERPL-MCNC: 0.77 MG/DL — SIGNIFICANT CHANGE UP (ref 0.5–1.3)
CREAT SERPL-MCNC: 0.8 MG/DL — SIGNIFICANT CHANGE UP (ref 0.5–1.3)
CULTURE RESULTS: SIGNIFICANT CHANGE UP
EGFR: 105 ML/MIN/1.73M2 — SIGNIFICANT CHANGE UP
EGFR: 106 ML/MIN/1.73M2 — SIGNIFICANT CHANGE UP
EGFR: 107 ML/MIN/1.73M2 — SIGNIFICANT CHANGE UP
GAS PNL BLDV: 133 MMOL/L — LOW (ref 136–145)
GAS PNL BLDV: SIGNIFICANT CHANGE UP
GLUCOSE BLDC GLUCOMTR-MCNC: 102 MG/DL — HIGH (ref 70–99)
GLUCOSE BLDC GLUCOMTR-MCNC: 105 MG/DL — HIGH (ref 70–99)
GLUCOSE SERPL-MCNC: 128 MG/DL — HIGH (ref 70–99)
GLUCOSE SERPL-MCNC: 139 MG/DL — HIGH (ref 70–99)
GLUCOSE SERPL-MCNC: 146 MG/DL — HIGH (ref 70–99)
HCO3 BLDA-SCNC: 23 MMOL/L — SIGNIFICANT CHANGE UP (ref 21–28)
HCO3 BLDV-SCNC: 25 MMOL/L — SIGNIFICANT CHANGE UP (ref 22–29)
HCT VFR BLD CALC: 27.7 % — LOW (ref 39–50)
HGB BLD-MCNC: 9.6 G/DL — LOW (ref 13–17)
INR BLD: 1.27 — HIGH (ref 0.88–1.16)
LACTATE SERPL-SCNC: 1.5 MMOL/L — SIGNIFICANT CHANGE UP (ref 0.5–2)
MAGNESIUM SERPL-MCNC: 2.3 MG/DL — SIGNIFICANT CHANGE UP (ref 1.6–2.6)
MAGNESIUM SERPL-MCNC: 2.3 MG/DL — SIGNIFICANT CHANGE UP (ref 1.6–2.6)
MCHC RBC-ENTMCNC: 30.3 PG — SIGNIFICANT CHANGE UP (ref 27–34)
MCHC RBC-ENTMCNC: 34.7 GM/DL — SIGNIFICANT CHANGE UP (ref 32–36)
MCV RBC AUTO: 87.4 FL — SIGNIFICANT CHANGE UP (ref 80–100)
METHOD TYPE: SIGNIFICANT CHANGE UP
NRBC # BLD: 0 /100 WBCS — SIGNIFICANT CHANGE UP (ref 0–0)
OB PNL STL: POSITIVE
ORGANISM # SPEC MICROSCOPIC CNT: SIGNIFICANT CHANGE UP
ORGANISM # SPEC MICROSCOPIC CNT: SIGNIFICANT CHANGE UP
PCO2 BLDA: 31 MMHG — LOW (ref 35–48)
PCO2 BLDV: 41 MMHG — LOW (ref 42–55)
PH BLDA: 7.47 — HIGH (ref 7.35–7.45)
PH BLDV: 7.39 — SIGNIFICANT CHANGE UP (ref 7.32–7.43)
PHOSPHATE SERPL-MCNC: 4 MG/DL — SIGNIFICANT CHANGE UP (ref 2.5–4.5)
PHOSPHATE SERPL-MCNC: 4.1 MG/DL — SIGNIFICANT CHANGE UP (ref 2.5–4.5)
PLATELET # BLD AUTO: 72 K/UL — LOW (ref 150–400)
PO2 BLDA: 88 MMHG — SIGNIFICANT CHANGE UP (ref 83–108)
PO2 BLDV: 38 MMHG — SIGNIFICANT CHANGE UP (ref 25–45)
POTASSIUM BLDV-SCNC: 4.4 MMOL/L — SIGNIFICANT CHANGE UP (ref 3.5–5.1)
POTASSIUM SERPL-MCNC: 4 MMOL/L — SIGNIFICANT CHANGE UP (ref 3.5–5.3)
POTASSIUM SERPL-MCNC: 4.1 MMOL/L — SIGNIFICANT CHANGE UP (ref 3.5–5.3)
POTASSIUM SERPL-MCNC: 4.3 MMOL/L — SIGNIFICANT CHANGE UP (ref 3.5–5.3)
POTASSIUM SERPL-SCNC: 4 MMOL/L — SIGNIFICANT CHANGE UP (ref 3.5–5.3)
POTASSIUM SERPL-SCNC: 4.1 MMOL/L — SIGNIFICANT CHANGE UP (ref 3.5–5.3)
POTASSIUM SERPL-SCNC: 4.3 MMOL/L — SIGNIFICANT CHANGE UP (ref 3.5–5.3)
PROT SERPL-MCNC: 5.6 G/DL — LOW (ref 6–8.3)
PROTHROM AB SERPL-ACNC: 15.1 SEC — HIGH (ref 10.5–13.4)
RBC # BLD: 3.17 M/UL — LOW (ref 4.2–5.8)
RBC # FLD: 16.8 % — HIGH (ref 10.3–14.5)
SAO2 % BLDA: 98.2 % — HIGH (ref 94–98)
SAO2 % BLDV: 65.8 % — LOW (ref 67–88)
SODIUM SERPL-SCNC: 131 MMOL/L — LOW (ref 135–145)
SODIUM SERPL-SCNC: 132 MMOL/L — LOW (ref 135–145)
SODIUM SERPL-SCNC: 134 MMOL/L — LOW (ref 135–145)
SPECIMEN SOURCE: SIGNIFICANT CHANGE UP
TRIGL SERPL-MCNC: 207 MG/DL — HIGH
WBC # BLD: 16.47 K/UL — HIGH (ref 3.8–10.5)
WBC # FLD AUTO: 16.47 K/UL — HIGH (ref 3.8–10.5)

## 2023-05-29 PROCEDURE — 99291 CRITICAL CARE FIRST HOUR: CPT | Mod: GC

## 2023-05-29 PROCEDURE — 99231 SBSQ HOSP IP/OBS SF/LOW 25: CPT

## 2023-05-29 RX ORDER — ACETAMINOPHEN 500 MG
1000 TABLET ORAL ONCE
Refills: 0 | Status: COMPLETED | OUTPATIENT
Start: 2023-05-29 | End: 2023-05-30

## 2023-05-29 RX ORDER — I.V. FAT EMULSION 20 G/100ML
0.7 EMULSION INTRAVENOUS
Qty: 50 | Refills: 0 | Status: DISCONTINUED | OUTPATIENT
Start: 2023-05-29 | End: 2023-05-29

## 2023-05-29 RX ORDER — ACETAMINOPHEN 500 MG
1000 TABLET ORAL ONCE
Refills: 0 | Status: COMPLETED | OUTPATIENT
Start: 2023-05-29 | End: 2023-05-29

## 2023-05-29 RX ORDER — OXYCODONE HYDROCHLORIDE 5 MG/1
10 TABLET ORAL EVERY 4 HOURS
Refills: 0 | Status: DISCONTINUED | OUTPATIENT
Start: 2023-05-29 | End: 2023-05-30

## 2023-05-29 RX ORDER — OXYCODONE HYDROCHLORIDE 5 MG/1
5 TABLET ORAL EVERY 4 HOURS
Refills: 0 | Status: DISCONTINUED | OUTPATIENT
Start: 2023-05-29 | End: 2023-05-30

## 2023-05-29 RX ORDER — ELECTROLYTE SOLUTION,INJ
1 VIAL (ML) INTRAVENOUS
Refills: 0 | Status: DISCONTINUED | OUTPATIENT
Start: 2023-05-29 | End: 2023-05-29

## 2023-05-29 RX ADMIN — Medication 3 MILLILITER(S): at 16:12

## 2023-05-29 RX ADMIN — I.V. FAT EMULSION 20.83 GM/KG/DAY: 20 EMULSION INTRAVENOUS at 18:05

## 2023-05-29 RX ADMIN — HEPARIN SODIUM 5000 UNIT(S): 5000 INJECTION INTRAVENOUS; SUBCUTANEOUS at 21:03

## 2023-05-29 RX ADMIN — OXYCODONE HYDROCHLORIDE 10 MILLIGRAM(S): 5 TABLET ORAL at 16:41

## 2023-05-29 RX ADMIN — Medication 3 MILLILITER(S): at 09:47

## 2023-05-29 RX ADMIN — OXYCODONE HYDROCHLORIDE 10 MILLIGRAM(S): 5 TABLET ORAL at 21:03

## 2023-05-29 RX ADMIN — OXYCODONE HYDROCHLORIDE 10 MILLIGRAM(S): 5 TABLET ORAL at 17:41

## 2023-05-29 RX ADMIN — Medication 3 MILLILITER(S): at 05:28

## 2023-05-29 RX ADMIN — PANTOPRAZOLE SODIUM 40 MILLIGRAM(S): 20 TABLET, DELAYED RELEASE ORAL at 18:04

## 2023-05-29 RX ADMIN — OXYCODONE HYDROCHLORIDE 10 MILLIGRAM(S): 5 TABLET ORAL at 13:38

## 2023-05-29 RX ADMIN — CHLORHEXIDINE GLUCONATE 15 MILLILITER(S): 213 SOLUTION TOPICAL at 05:54

## 2023-05-29 RX ADMIN — Medication 400 MILLIGRAM(S): at 11:29

## 2023-05-29 RX ADMIN — MEROPENEM 100 MILLIGRAM(S): 1 INJECTION INTRAVENOUS at 05:53

## 2023-05-29 RX ADMIN — MEROPENEM 100 MILLIGRAM(S): 1 INJECTION INTRAVENOUS at 14:29

## 2023-05-29 RX ADMIN — Medication 1 DROP(S): at 18:04

## 2023-05-29 RX ADMIN — Medication 1 EACH: at 18:05

## 2023-05-29 RX ADMIN — Medication 1000 MILLIGRAM(S): at 12:01

## 2023-05-29 RX ADMIN — HEPARIN SODIUM 5000 UNIT(S): 5000 INJECTION INTRAVENOUS; SUBCUTANEOUS at 05:54

## 2023-05-29 RX ADMIN — LACOSAMIDE 150 MILLIGRAM(S): 50 TABLET ORAL at 23:37

## 2023-05-29 RX ADMIN — Medication 400 MILLIGRAM(S): at 02:19

## 2023-05-29 RX ADMIN — Medication 3 MILLILITER(S): at 21:02

## 2023-05-29 RX ADMIN — CHLORHEXIDINE GLUCONATE 1 APPLICATION(S): 213 SOLUTION TOPICAL at 05:53

## 2023-05-29 RX ADMIN — LACOSAMIDE 150 MILLIGRAM(S): 50 TABLET ORAL at 12:47

## 2023-05-29 RX ADMIN — Medication 81 MILLIGRAM(S): at 12:38

## 2023-05-29 RX ADMIN — OXYCODONE HYDROCHLORIDE 10 MILLIGRAM(S): 5 TABLET ORAL at 12:38

## 2023-05-29 RX ADMIN — IOHEXOL 30 MILLILITER(S): 300 INJECTION, SOLUTION INTRAVENOUS at 07:10

## 2023-05-29 RX ADMIN — DEXMEDETOMIDINE HYDROCHLORIDE IN 0.9% SODIUM CHLORIDE 3.5 MICROGRAM(S)/KG/HR: 4 INJECTION INTRAVENOUS at 01:20

## 2023-05-29 RX ADMIN — DEXMEDETOMIDINE HYDROCHLORIDE IN 0.9% SODIUM CHLORIDE 3.5 MICROGRAM(S)/KG/HR: 4 INJECTION INTRAVENOUS at 18:56

## 2023-05-29 RX ADMIN — OXYCODONE HYDROCHLORIDE 10 MILLIGRAM(S): 5 TABLET ORAL at 22:03

## 2023-05-29 RX ADMIN — PANTOPRAZOLE SODIUM 40 MILLIGRAM(S): 20 TABLET, DELAYED RELEASE ORAL at 05:53

## 2023-05-29 RX ADMIN — CASPOFUNGIN ACETATE 260 MILLIGRAM(S): 7 INJECTION, POWDER, LYOPHILIZED, FOR SOLUTION INTRAVENOUS at 12:39

## 2023-05-29 RX ADMIN — MEROPENEM 100 MILLIGRAM(S): 1 INJECTION INTRAVENOUS at 21:03

## 2023-05-29 RX ADMIN — CHLORHEXIDINE GLUCONATE 15 MILLILITER(S): 213 SOLUTION TOPICAL at 18:56

## 2023-05-29 RX ADMIN — HEPARIN SODIUM 5000 UNIT(S): 5000 INJECTION INTRAVENOUS; SUBCUTANEOUS at 14:28

## 2023-05-29 RX ADMIN — Medication 1 APPLICATION(S): at 05:54

## 2023-05-29 NOTE — PROGRESS NOTE ADULT - ASSESSMENT
----- Message from Shital Morelos sent at 1/9/2020  1:23 PM CST -----  Contact: self   Patient is calling for an RX refill or new RX.  Is this a refill or new RX:    RX name and strength: guaifenesin-codeine 100-10 mg/5 ml (CHERATUSSIN AC)  mg/5 mL syrup  Directions (copy/paste from chart):  Take 5 mLs by mouth 3 (three) times daily as needed for Cough or Congestion  Is this a 30 day or 90 day RX:    Local pharmacy or mail order pharmacy:  local  Pharmacy name and phone # (copy/paste from chart):   [facility]/pharmacy #[id number] - [place] - [address] [phone] (Phone)  [phone] (Fax)  Comments:           53yMale w/ PMHx of HTN, type A aortic dissection s/p Dacron grafts, AV resuspension in 2013, CAD s/p CABG x 1 SVG to RCA (5/2013 with Dr. Medina), seizure disorder, recent admission 3/20-4/14 for replacement of transverse aortic arch, second stage TEVAR and aorto-axillary bypass, AV replacement (bio 23mm), and CABG x 1 (SVG-RCA). Pt found to have endo leak and taken to OR for TEVAR revision on 5/5, Post op course c/b Klebsiella bacteremia (5/15), resp failure requiring intubation on 5/18, Mucus plugging s/p Bronch 5/19 and PEA arrest. Post operatively pt also found to have hemorrhagic pancreatitis with venu-pancreatic abscess possibly 2* to Depakote therefore s/p IR aspiration of peripancreatic fluid collection and paracentesis on 5/22, IR venu-pancreatic abscess drainage and placement of drainage catheter on 5/24. Pt then transferred from CTICU to SICU for further mgmt of hemorrhagic pancreatitis on 5/25. s/p IR placement of 2 new drainage catheters and catheter exchange on 5/26.     NEURO: Precedex/Fentanyl drip. Hx seizures: s/p epilepsy consult, Cont vimpat,. Depakote weaned off due to concerns for drug-related hemorrhagic pancreatitis.   CV: s/p PEA arrest on 5/24 night, Septic shock: On Levophed for MAP> 65 . Echo from 5/19 hyperdynamic LV, SHAJI with LVOTO (gradient 26), normal RV, mild MR, no pulm htn. Cont ASA 81mg.    PULM: intubated 5/13 on AC 40/500/22/8, ARDS now improved off NO, s/p multiple Mucus plug/ Lung collapse s/p bronch x3 (most recently on 5/26) most likely from outward obstruction: Cont rotating pt around the clock. Cont Duonebs, & Chest PT q4. CPAP trial 40%/8/8 daily.  GI/FEN: NPO on TPN/Lipids, Trickle feeds Nepro @10. protonix BID, Hemorrhagic pancreatitis: s/p Multiple Drain placements and paracentisis by IR team.  Fecal Occult blood (P) BM. Constipation: Cont Dulcolax.   : TREY on CVVHD. Will attempt to remove fluid today for goal 500-1L negative if BP allows. Nephro following. Cantrell removed on 5/26- making 100cc of urine over 48 hrs. Bladder scan daily.   HEME: Acute blood loss anemia on 5/12 requiring 11units of PRBC. IR negative for mesenteric extrav. Hgb drifting down to 6.5 this am ordered 2UPRBC and f/u Post transfusion CBC.    ENDO: mISS  ID: Chloe (5/21-) Caspo (5/23-) ID following. Kleb bacteremia from 5/15 & 5/17, DC: Ampicillin ( 5/21- 5/27) subsequent bld cx negative. PNA w/ bronch cx kleb, enterobacter (zosyn, erta resistant), E faecalis, strep angionosus from 5/19, pancreatic abscess cx pan-sensitive kleb 5/22.   PPX: SCDs, SQH.   LINES: R Ax kalpana(5/12-) RIJ HD cath (5/22--), [facility]J TLC (5/23--) Dc:  RIJ TLC (5/22-5/27),   WOUNDS/DRAINS: left venu-pancreatic abscess CODIE drain x 2, ascites CODIE x 2  PT: PT/OR ordered on 5/28  Dispo: SICU

## 2023-05-29 NOTE — PROGRESS NOTE ADULT - ATTENDING COMMENTS
Acute hypoxic respiratory failure (intubated) with critical care myopathy/neuropathy with Aortic dissection s/p repair with endo leak with nette-pancreatic abscess s/p drainage. Did not tolerate CPAP. Continue levophed for shock. Continue Meropenem , caspofungin. Rest as above

## 2023-05-29 NOTE — PROGRESS NOTE ADULT - ASSESSMENT
This is a 54yo Male pt with PMH HTN, type A aortic dissection s/p Dacron grafts, AV resuspension in 2013, CAD s/p CABG x 1 SVG to RCA (5/2013 with Dr. Medina), seizure disorder (last episode on 7/4/22) S/P replacement of transverse aortic arch, second stage thoracic endovascular aortic repair, aorto-axillary bypass, AV replacement (bio 23mm), in APr 2023 and CABG x 1 (SVG-RCA) EF 75% (Ignacio, 3/20) now s/p 2nd state TEVAR on 5/5 for whom surgery was consulted for finding of acute pancreatitis with large peripancreatic/perigastric fluid collections on CTA abdomen 5/8 then acute hemorrhage starting 5/12/23 requiring 11 U pRBC but with negative mesenteric angiogram 5/13/23 for whom hepatobiliary surgery was reconsulted for possible hemorrhagic pancreatitis, now with 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     - Repeat CT 5/29  - Wean pressors as appropriate  - CPAP trial and extubate when appropriate  - CVVH per SICU  - C/w Abx - ampicillin, meropenem, caspofungin  - Continued Excellent Care per SICU

## 2023-05-29 NOTE — PROGRESS NOTE ADULT - SUBJECTIVE AND OBJECTIVE BOX
INTERVAL/OVERNIGHT EVENTS: fent restarted low for discomfort and overbreathing vent, Temp 100.6, ofirmev given, 400 out of L hematoma drain during cleaning, Hgb stable    SUBJECTIVE: Patient seen and examined bedside.    aspirin  chewable 81 milliGRAM(s) Oral daily  caspofungin IVPB 50 milliGRAM(s) IV Intermittent every 24 hours  caspofungin IVPB      heparin   Injectable 5000 Unit(s) SubCutaneous every 8 hours  meropenem  IVPB 1000 milliGRAM(s) IV Intermittent every 8 hours  norepinephrine Infusion 0.05 MICROgram(s)/kG/Min IV Continuous <Continuous>      Vital Signs Last 24 Hrs  T(C): 38.2 (29 May 2023 05:52), Max: 38.2 (29 May 2023 05:52)  T(F): 100.8 (29 May 2023 05:52), Max: 100.8 (29 May 2023 05:52)  HR: 122 (29 May 2023 06:00) (80 - 125)  BP: --  BP(mean): --  RR: 29 (29 May 2023 06:00) (18 - 34)  SpO2: 96% (29 May 2023 06:00) (92% - 100%)    Parameters below as of 29 May 2023 06:00  Patient On (Oxygen Delivery Method): ventilator    O2 Concentration (%): 40  I&O's Detail    28 May 2023 07:01  -  29 May 2023 07:00  --------------------------------------------------------  IN:    Dexmedetomidine: 349.3 mL    Enteral Tube Flush: 60 mL    Fat Emulsion (Fish Oil &amp; Plant Based) 20% Infusion: 265.2 mL    FentaNYL: 3.5 mL    IV PiggyBack: 250 mL    IV PiggyBack: 100 mL    IV PiggyBack: 125 mL    IV PiggyBack: 150 mL    Nepro with Carb Steady: 120 mL    Norepinephrine: 109.1 mL    PRBCs (Packed Red Blood Cells): 600 mL    TPN (Total Parenteral Nutrition): 1680 mL  Total IN: 3812.1 mL    OUT:    Bulb (mL): 35 mL    Bulb (mL): 75 mL    Drain (mL): 0 mL    Drain (mL): 45 mL    Drain (mL): 395 mL    Drain (mL): 100 mL    Drain (mL): 40 mL    Fat Emulsion (Fish Oil &amp; Plant Based) 20% Infusion: 0 mL    Incontinent per Condom Catheter (mL): 15 mL    Intermittent Catheterization - Urethral (mL): 50 mL    Other (mL): 2986 mL  Total OUT: 3741 mL    Total NET: 71.1 mL          General: NAD, resting comfortably in bed  C/V: NSR  Pulm: Nonlabored breathing, no respiratory distress  Abd: soft, NT/ND.  Extrem: WWP, no edema, SCDs in place        LABS:                        9.6    16.47 )-----------( 72       ( 29 May 2023 05:30 )             27.7     05-29    134<L>  |  103  |  29<H>  ----------------------------<  139<H>  4.3   |  23  |  0.75    Ca    8.0<L>      29 May 2023 05:30  Phos  4.1     05-29  Mg     2.3     05-29    TPro  5.6<L>  /  Alb  2.2<L>  /  TBili  2.8<H>  /  DBili  2.0<H>  /  AST  28  /  ALT  13  /  AlkPhos  347<H>  05-29    PT/INR - ( 29 May 2023 05:30 )   PT: 15.1 sec;   INR: 1.27          PTT - ( 29 May 2023 05:30 )  PTT:35.8 sec      RADIOLOGY & ADDITIONAL STUDIES:   INTERVAL/OVERNIGHT EVENTS: fent restarted low for discomfort and overbreathing vent, Temp 100.6, ofirmev given, 400 out of L hematoma drain during cleaning, Hgb stable    SUBJECTIVE: Patient seen and examined bedside. Pt intubated and sedated, unable to obtain subjective history at this time.    aspirin  chewable 81 milliGRAM(s) Oral daily  caspofungin IVPB 50 milliGRAM(s) IV Intermittent every 24 hours  caspofungin IVPB      heparin   Injectable 5000 Unit(s) SubCutaneous every 8 hours  meropenem  IVPB 1000 milliGRAM(s) IV Intermittent every 8 hours  norepinephrine Infusion 0.05 MICROgram(s)/kG/Min IV Continuous <Continuous>      Vital Signs Last 24 Hrs  T(C): 38.2 (29 May 2023 05:52), Max: 38.2 (29 May 2023 05:52)  T(F): 100.8 (29 May 2023 05:52), Max: 100.8 (29 May 2023 05:52)  HR: 122 (29 May 2023 06:00) (80 - 125)  BP: --  BP(mean): --  RR: 29 (29 May 2023 06:00) (18 - 34)  SpO2: 96% (29 May 2023 06:00) (92% - 100%)    Parameters below as of 29 May 2023 06:00  Patient On (Oxygen Delivery Method): ventilator    O2 Concentration (%): 40  I&O's Detail    28 May 2023 07:01  -  29 May 2023 07:00  --------------------------------------------------------  IN:    Dexmedetomidine: 349.3 mL    Enteral Tube Flush: 60 mL    Fat Emulsion (Fish Oil &amp; Plant Based) 20% Infusion: 265.2 mL    FentaNYL: 3.5 mL    IV PiggyBack: 250 mL    IV PiggyBack: 100 mL    IV PiggyBack: 125 mL    IV PiggyBack: 150 mL    Nepro with Carb Steady: 120 mL    Norepinephrine: 109.1 mL    PRBCs (Packed Red Blood Cells): 600 mL    TPN (Total Parenteral Nutrition): 1680 mL  Total IN: 3812.1 mL    OUT:    Bulb (mL): 35 mL    Bulb (mL): 75 mL    Drain (mL): 0 mL    Drain (mL): 45 mL    Drain (mL): 395 mL    Drain (mL): 100 mL    Drain (mL): 40 mL    Fat Emulsion (Fish Oil &amp; Plant Based) 20% Infusion: 0 mL    Incontinent per Condom Catheter (mL): 15 mL    Intermittent Catheterization - Urethral (mL): 50 mL    Other (mL): 2986 mL  Total OUT: 3741 mL    Total NET: 71.1 mL          General: sedated, resting comfortably in bed, follows basic commands  HEENT: EET in place, OG tube in place, R IJ HD cath in place, L IJ TLC in place  C/V: NSR, no MRG  Pulm: intubated on CPAP  Abd: soft, mildly distended, IR drains with bilious/dark red fluid  : condom cath in place with minimal urine output  Extrem: WWP, +2 dependent edema in b/l upper and lower extremities, SCDs in place        LABS:                        9.6    16.47 )-----------( 72       ( 29 May 2023 05:30 )             27.7     05-29    134<L>  |  103  |  29<H>  ----------------------------<  139<H>  4.3   |  23  |  0.75    Ca    8.0<L>      29 May 2023 05:30  Phos  4.1     05-29  Mg     2.3     05-29    TPro  5.6<L>  /  Alb  2.2<L>  /  TBili  2.8<H>  /  DBili  2.0<H>  /  AST  28  /  ALT  13  /  AlkPhos  347<H>  05-29    PT/INR - ( 29 May 2023 05:30 )   PT: 15.1 sec;   INR: 1.27          PTT - ( 29 May 2023 05:30 )  PTT:35.8 sec      RADIOLOGY & ADDITIONAL STUDIES:   INTERVAL/OVERNIGHT EVENTS: fent restarted low for discomfort and overbreathing vent, Temp 100.6, ofirmev given, 400 out of L hematoma drain during cleaning, Hgb stable    SUBJECTIVE: Patient seen and examined bedside. Pt intubated and sedated, unable to obtain subjective history at this time.    aspirin  chewable 81 milliGRAM(s) Oral daily  caspofungin IVPB 50 milliGRAM(s) IV Intermittent every 24 hours  caspofungin IVPB      heparin   Injectable 5000 Unit(s) SubCutaneous every 8 hours  meropenem  IVPB 1000 milliGRAM(s) IV Intermittent every 8 hours  norepinephrine Infusion 0.05 MICROgram(s)/kG/Min IV Continuous <Continuous>      Vital Signs Last 24 Hrs  T(C): 38.2 (29 May 2023 05:52), Max: 38.2 (29 May 2023 05:52)  T(F): 100.8 (29 May 2023 05:52), Max: 100.8 (29 May 2023 05:52)  HR: 122 (29 May 2023 06:00) (80 - 125)  BP: --  BP(mean): --  RR: 29 (29 May 2023 06:00) (18 - 34)  SpO2: 96% (29 May 2023 06:00) (92% - 100%)    Parameters below as of 29 May 2023 06:00  Patient On (Oxygen Delivery Method): ventilator    O2 Concentration (%): 40  I&O's Detail    28 May 2023 07:01  -  29 May 2023 07:00  --------------------------------------------------------  IN:    Dexmedetomidine: 349.3 mL    Enteral Tube Flush: 60 mL    Fat Emulsion (Fish Oil &amp; Plant Based) 20% Infusion: 265.2 mL    FentaNYL: 3.5 mL    IV PiggyBack: 250 mL    IV PiggyBack: 100 mL    IV PiggyBack: 125 mL    IV PiggyBack: 150 mL    Nepro with Carb Steady: 120 mL    Norepinephrine: 109.1 mL    PRBCs (Packed Red Blood Cells): 600 mL    TPN (Total Parenteral Nutrition): 1680 mL  Total IN: 3812.1 mL    OUT:    Bulb (mL): 35 mL    Bulb (mL): 75 mL    Drain (mL): 0 mL    Drain (mL): 45 mL    Drain (mL): 395 mL    Drain (mL): 100 mL    Drain (mL): 40 mL    Fat Emulsion (Fish Oil &amp; Plant Based) 20% Infusion: 0 mL    Incontinent per Condom Catheter (mL): 15 mL    Intermittent Catheterization - Urethral (mL): 50 mL    Other (mL): 2986 mL  Total OUT: 3741 mL    Total NET: 71.1 mL          General: sedated, resting comfortably in bed, follows basic commands  HEENT: EET in place, OG tube in place, R IJ HD cath in place, L IJ TLC in place. Neck Torticollis to left side  C/V: NSR, no MRG  Pulm: intubated on CPAP  Abd: soft, mildly distended, IR drains with bilious/dark red fluid  : condom cath in place with minimal urine output  Extrem: WWP, +2 dependent edema in b/l upper and lower extremities, SCDs in place        LABS:                        9.6    16.47 )-----------( 72       ( 29 May 2023 05:30 )             27.7     05-29    134<L>  |  103  |  29<H>  ----------------------------<  139<H>  4.3   |  23  |  0.75    Ca    8.0<L>      29 May 2023 05:30  Phos  4.1     05-29  Mg     2.3     05-29    TPro  5.6<L>  /  Alb  2.2<L>  /  TBili  2.8<H>  /  DBili  2.0<H>  /  AST  28  /  ALT  13  /  AlkPhos  347<H>  05-29    PT/INR - ( 29 May 2023 05:30 )   PT: 15.1 sec;   INR: 1.27          PTT - ( 29 May 2023 05:30 )  PTT:35.8 sec      RADIOLOGY & ADDITIONAL STUDIES:

## 2023-05-29 NOTE — PROGRESS NOTE ADULT - SUBJECTIVE AND OBJECTIVE BOX
-All drains flushed easily with 3-5 cc NS without resistance.  -Dressings c/d/i.    1. 16 Fr teal LUQ peripancreatic abscess "Left Pancreatic CODIE Drain" placed on 5/24:   -100 cc serosanguinous output in 24 hrs. 315 cc in previous 24 hrs.     2. 16 Fr teal LUQ hematoma "Left Abdominal Hematoma CODIE Drain" placed on 5/25 and upsized on 5/26:   -395 cc dark sanguinous output in 24 hrs. 50 cc in previous 24 hrs.    3. 14 Fr white left mid abdomen pigtail ascites drain "Mid Abdominal Ascites CODIE Drain" placed on 5/25:  -40 cc serous output in 24 hrs. 50 cc in previous 24 hrs.      4. 16 Fr teal pelvic abscess/hematoma drain "Left Pelvic Hematoma" (previously "Lower Abdominal Ascites CODIE Drain" placed on 5/25) and upsized on 5/26.  -45 cc serosanguinous output in 24 hrs. 115 cc in previous 24 hrs.    5. 16 Fr teal ascites drain "Right/Ascites drain" placed on 5/26:  -75 cc serosanguinous output in 24 hrs. 45 cc in previous 24 hrs    6. 16 Fr teal pelvic hematoma/abscess drain "Right Pelvic/hematoma drain" placed on 5/26.  -35 cc sanguinous output in 24 hrs. 20 cc in previous 24 hrs.

## 2023-05-29 NOTE — PROGRESS NOTE ADULT - ASSESSMENT
51 YO Male, HTN aortic dissection previous admission with severe cardiogenic shock and oliguric TREY requiring CVVHD. Presented to the ED with worsening epigastric pain CTA/P revealed continued endoleak hospital course complicated by necrotic pancreatitis      #oligoanuric ATN 2/2 cardiogenic shock  #necrotizing pancreatitis    #Seen on CVVHD    recommend:  c/w CVVHD for UF and clearance   will c/w phoxillum as phos 4.1  Qb 300ml/min DFR 2L/hour UF, net neg 20ml/hr to a goal of net neg 500ml/day  q8H BMP while on CVVHD  Check phos daily  maintain MAP > 70 for adequate renal perfusion  strict i's and o's  renally dose abx egfr <15  Will recommend avoiding IV Contrast to prevent further renal insult as pt still oliguric, requiring CVVHD support

## 2023-05-29 NOTE — PROGRESS NOTE ADULT - SUBJECTIVE AND OBJECTIVE BOX
Patient is a 53y Male seen and evaluated at bedside. Remains intubated, sedated. Overnight events noted.      Meds:    albuterol/ipratropium for Nebulization 3 every 6 hours  aspirin  chewable 81 daily  bisacodyl Suppository 10 daily  caspofungin IVPB 50 every 24 hours  caspofungin IVPB    chlorhexidine 0.12% Liquid 15 every 12 hours  chlorhexidine 2% Cloths 1 daily  dexMEDEtomidine Infusion 0.2 <Continuous>  dextrose 5%. 1000 <Continuous>  dextrose 5%. 1000 <Continuous>  dextrose 50% Injectable 25 once  dextrose 50% Injectable 12.5 once  dextrose 50% Injectable 25 once  dextrose Oral Gel 15 once PRN  fentaNYL   Infusion... 0.5 <Continuous>  glucagon  Injectable 1 once  heparin   Injectable 5000 every 8 hours  insulin lispro (ADMELOG) corrective regimen sliding scale  every 6 hours  lacosamide IVPB 250 every 12 hours  lipid, fat emulsion (Fish Oil and Plant Based) 20% Infusion 0.7 <Continuous>  meropenem  IVPB 1000 every 8 hours  norepinephrine Infusion 0.05 <Continuous>  pantoprazole  Injectable 40 two times a day  Parenteral Nutrition - Adult 1 <Continuous>  Parenteral Nutrition - Adult 1 <Continuous>  petrolatum Ophthalmic Ointment 1 every 6 hours      T(C): , Max: 38.2 (05-29-23 @ 05:52)  T(F): , Max: 100.8 (05-29-23 @ 05:52)  HR: 109 (05-29-23 @ 10:00)  BP: --  BP(mean): --  RR: 25 (05-29-23 @ 10:00)  SpO2: 98% (05-29-23 @ 10:00)  Wt(kg): --    05-28 @ 07:01  -  05-29 @ 07:00  --------------------------------------------------------  IN: 4336.1 mL / OUT: 3861 mL / NET: 475.1 mL    05-29 @ 07:01  -  05-29 @ 11:54  --------------------------------------------------------  IN: 21.5 mL / OUT: 0 mL / NET: 21.5 mL          Review of Systems:  ROS negative except as per HPI      PHYSICAL EXAM:  GENERAL: NAD  NECK: supple, No JVD  CHEST/LUNG: Clear to auscultation bilaterally  HEART: normal S1S2, RRR  ABDOMEN: Soft, Nontender, +BS, No flank tenderness bilateral  EXTREMITIES: No clubbing, cyanosis, or edema   NEUROLOGY: AAO x3, no focal neurological deficit  ACCESS: good thrill and bruit appreciated      LABS:                        9.6    16.47 )-----------( 72       ( 29 May 2023 05:30 )             27.7     05-29    134<L>  |  103  |  29<H>  ----------------------------<  139<H>  4.3   |  23  |  0.75    Ca    8.0<L>      29 May 2023 05:30  Phos  4.1     05-29  Mg     2.3     05-29    TPro  5.6<L>  /  Alb  2.2<L>  /  TBili  2.8<H>  /  DBili  2.0<H>  /  AST  28  /  ALT  13  /  AlkPhos  347<H>  05-29      PT/INR - ( 29 May 2023 05:30 )   PT: 15.1 sec;   INR: 1.27          PTT - ( 29 May 2023 05:30 )  PTT:35.8 sec          RADIOLOGY & ADDITIONAL STUDIES:      Phosphorus Level, Serum: 4.1 mg/dL (05-29-23 @ 05:30)  Hemoglobin: 9.6 g/dL (05-29-23 @ 05:30)  Phosphorus Level, Serum: 4.0 mg/dL (05-28-23 @ 23:10)  Phosphorus Level, Serum: 4.1 mg/dL (05-28-23 @ 17:11)    Albumin, Serum: 2.2 g/dL (05-29-23 @ 05:30)  Albumin, Serum: 2.1 g/dL (05-28-23 @ 05:11)    T(C): 37.5 (05-29-23 @ 09:00), Max: 38.2 (05-29-23 @ 05:52)  HR: 109 (05-29-23 @ 10:00) (80 - 125)  BP: --  RR: 25 (05-29-23 @ 10:00) (18 - 34)  SpO2: 98% (05-29-23 @ 10:00) (91% - 100%)

## 2023-05-29 NOTE — CHART NOTE - NSCHARTNOTEFT_GEN_A_CORE
Admitting Diagnosis:   Patient is a 53y old  Male who presents with a chief complaint of endoleak, abdominal pain (29 May 2023 11:54)      PAST MEDICAL & SURGICAL HISTORY:  HTN (hypertension)  Aortic dissection  CAD (coronary artery disease)  Seizure disorder  S/P aortic bifurcation bypass graft  S/P CABG x 1      Current Nutrition Order:   TPN: 368g dex, 105g AA, 2171 kcal, 50g SMOF, 105g protein, 3.7 GIR  Trickle feeds via NGT of nepro @10mL/hr x24hrs which provides 432 kcal, 19.4g protein, 175mL free water  TPN + EN provides: 2603 kcal, 124g protein       PO Intake: Good (%) [   ]  Fair (50-75%) [   ] Poor (<25%) [   ]-NPO    GI Issues: green BM  per EMR    Skin Integrity: DTI to coccyx, stage 2 PU to sacrum    Labs:       134<L>  |  103  |  29<H>  ----------------------------<  139<H>  4.3   |  23  |  0.75    Ca    8.0<L>      29 May 2023 05:30  Phos  4.1       Mg     2.3         TPro  5.6<L>  /  Alb  2.2<L>  /  TBili  2.8<H>  /  DBili  2.0<H>  /  AST  28  /  ALT  13  /  AlkPhos  347<H>      CAPILLARY BLOOD GLUCOSE      POCT Blood Glucose.: 105 mg/dL (29 May 2023 11:16)      Medications:  MEDICATIONS  (STANDING):  albuterol/ipratropium for Nebulization 3 milliLiter(s) Nebulizer every 6 hours  artificial  tears Solution 1 Drop(s) Both EYES two times a day  aspirin  chewable 81 milliGRAM(s) Oral daily  caspofungin IVPB 50 milliGRAM(s) IV Intermittent every 24 hours  caspofungin IVPB      chlorhexidine 0.12% Liquid 15 milliLiter(s) Oral Mucosa every 12 hours  chlorhexidine 2% Cloths 1 Application(s) Topical daily  dexMEDEtomidine Infusion 0.2 MICROgram(s)/kG/Hr (3.5 mL/Hr) IV Continuous <Continuous>  dextrose 5%. 1000 milliLiter(s) (100 mL/Hr) IV Continuous <Continuous>  dextrose 5%. 1000 milliLiter(s) (50 mL/Hr) IV Continuous <Continuous>  dextrose 50% Injectable 25 Gram(s) IV Push once  dextrose 50% Injectable 25 Gram(s) IV Push once  dextrose 50% Injectable 12.5 Gram(s) IV Push once  glucagon  Injectable 1 milliGRAM(s) IntraMuscular once  heparin   Injectable 5000 Unit(s) SubCutaneous every 8 hours  insulin lispro (ADMELOG) corrective regimen sliding scale   SubCutaneous every 6 hours  lacosamide IVPB 250 milliGRAM(s) IV Intermittent every 12 hours  lipid, fat emulsion (Fish Oil and Plant Based) 20% Infusion 0.7 Gm/kG/Day (20.83 mL/Hr) IV Continuous <Continuous>  meropenem  IVPB 1000 milliGRAM(s) IV Intermittent every 8 hours  norepinephrine Infusion 0.05 MICROgram(s)/kG/Min (3.28 mL/Hr) IV Continuous <Continuous>  pantoprazole  Injectable 40 milliGRAM(s) IV Push two times a day  Parenteral Nutrition - Adult 1 Each (70 mL/Hr) TPN Continuous <Continuous>  Parenteral Nutrition - Adult 1 Each (70 mL/Hr) TPN Continuous <Continuous>    MEDICATIONS  (PRN):  dextrose Oral Gel 15 Gram(s) Oral once PRN Blood Glucose LESS THAN 70 milliGRAM(s)/deciliter  oxyCODONE    Solution 10 milliGRAM(s) Oral every 4 hours PRN Severe Pain (7 - 10)  oxyCODONE    Solution 5 milliGRAM(s) Enteral Tube every 4 hours PRN Moderate Pain (4 - 6)      Weight:  6'0''  pounds+-10%   Wt 154 pounds BMI 20.9 %IBW87    Weight Change: Based on most recent EMR wt     Estimated energy needs:   current body wt used for energy calculations as pt falls within % IBW  adjust for age, post op needs, pancreatitis, pressure ulcer, ICU level of care, Vent, plan for CVVHD HD   25-30kcal/k-2094kcal/day   1.5-2.0gm/k-140gm prot/day     Subjective: 53yr old male with PMH HTN, 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 Recent admission 3/20- during which patient underwent replacement of transverse aortic arch, second stage thoracic endovascular aortic repair, aorto-axillary bypass, AV replacement (bio 23mm), and CABG x 1 (SVG-RCA) EF 75% (Ignacio, 3/20). Post op cb lactic acidosis, severe cardiogenic and vasogenic shock, TREY requiring CVVHD and temporary dialysis requirement. Recent admission - for seizure episode, were his AEDS were adjusted. Presented to [place] ED  for epigastric pain. CT exam which showed known endoleak for which pt was tx to [facility]. Patient underwent TEVAR with Dr. Pierre , LD placed prior by NSGY. Arrived intubated on propofol.  extubated. 1 unit pRBC, CTH performed for decreased LE mvmt. Improved later in day.  LD clamped and patient ambulated, plan for dc.  US showing groin seroma and hematoma. CT scan showing necrotizing pancreatitis. Zosyn started and Gen surg consulted. Recommended IR and GI consults and further evaluation with imaging. Gi believes 2/2 insults from surgeries back in March and recommended outpatient fu.  new RIJ and PA catheter placed, R pigtail placed cb pneumothorax.  gen surg signed off. Repeat imaging showing concern for possible malignancy in pancreatic tail. Radiology attending called during day to say findings likely 2/2 pancreatitis and recommended outpatient contrast study. Around 7pm patient had questionable seizure-like episode. Given 2mg ativan. Labs sig for Hb drop to ~6. TREY with oliguria. Stat CT showing [id number] hematoma, mod-large ascites with layering in pelvis. Given 4 units pRBC and sent to IR for possible intervention.  returned from Ir intubated, no arterial bleeder identified therefore no intervention performed. Hb ~8, gen surg recommending additional transfusion.  Total transfusion 7 pRBC, 2 cryo, 2 FFP, 1 plt. Was given additional blood overnight (1 pRBC). Gen surg on board.  1 unit pRBC during day for episode of hypotension. 1 episode of seizure resolved with propofol push. Vimpat dose adjusted per neurology. Given 2 FFP and 1 plt. 5/15 discussed with neurology possibilty of depakote induced pancreatitis-dose being adjusted. 5/15 no changes, depakote level adjusted, eeg neg. New line placed 5/15. Pt with concern for clinical picture of SIRS/early sepsis  - prompting imaging and initiation of presumptive Abx. CT  showed pancreatic necrosis with loculations, slightly improved LLQ hematoma without gas to suggest superinfeciton. Primacor titrated off  and lopressor dosings given with improvement in tachycardia, now on esmolol infusion. S/p Bronch , ; xray improved after white out in AM. Nephrology consulted for CVVHD. : CVVHD started for TREY with Oliguria. : Paracentesis: 4L removed, LUQ aspiration (no drain at the time) - growing Kleb. : PEA arrest overnight after a turn. Dobutamine initiated. : Paracenthesis 1.6L. Upsizing of LUQ drain, placement of new drain, paracentesis with 2 JPs also placed (4 total). Tx to SICU. : CT CAP:Left retroperitoneal hematoma minimally improved from . Vaso off, Levo started during IR procedure now weaning off.  Switched to Propofol from Versed. Labs post op: Hgb stable LA 1.8. Fibrinogen 325.  DC coulter, & Bladder scan Daily. CVVHD:started @5pm f/u CVVHD labs @[place]. ON: Hgb 6.4 (8.0). 1 unit PRBCs given. Post transfusion 7.5. Reglan. : prealb 9. Bladder scan 200 however SCath 50. ETT adjusted for Leak CXR: @leeanne therefore pulled back 2cm and repeat CXR wnl. Given propofol for ETT adjustement, weaned off sedation Tolerating CPAP. Responding to Commands stating yes and no appropriately but generalized weakness throughout ordered OT/PT. Tolerating CPAP 40%. started trickle feeds. As per Renal: Recc No IV contrast . Started Dulcolax supp for Constipation. Ordered Second unit of PRBC and repeat CBC@6PM. Goal 500 neg and weaning off Levo after 2UPRBC. Bld cx's NGTD. Back on levo. Hgb 9.1 s/p blood. : TPN reordered. Increase Trickle feeds to Goal and Change to Doboff.    Pt seen for nutrition consult for EN recs. Pt vented on AC/CMV. MAP 88. Pt continues on TPN and started on trickle feeds of EN on . Discussed with team-pt to remain on trickle feeds today. Current TF regimen exceeding pt's calorie needs (2603 kcal). If TPN no longer required and pt tolerating EN, recommend to increase EN to goal rate as medically feasible. BUN elevated. Meds: norepinephrine. See nutrition recommendations below.     Previous Nutrition Diagnosis: Increased nutrients RT increased demands AEB Vent     Active [  x ]  Resolved [   ]    Goal: Pt to meet at least 75% of nutritional needs consistently via most feasible route    Recommendations:  1. If TPN no longer required and pt tolerating EN, recommend: Nepro @ 40 mL/hr x 24 hours + LPS TID. This provides: 960mL TV, 2028 kcal, 122.8 g protein, 698mL free water. Meetin kcal/kg, 1.75 g/kg protein based on ABW 70 kg. Defer fluids to team. Maintain aspiration precautions. Monitor s/s of intolerance; adjust EN as needed.   2. Pain/GI per team.  3. Labs: monitor BMP, CBC, glucose, lytes, trend renal indices, LFTs, POCT, lipids/TG, lactate, Amylase and Lipase; MAP.  4. RD to remain available for additional nutrition interventions as needed.     Education: N/A    Risk Level: High [  x ] Moderate [   ] Low [   ]

## 2023-05-29 NOTE — PROGRESS NOTE ADULT - ASSESSMENT
IMPRESSION:  53 year old male hx seizure disorder, aortic dissection s/p AVR, aortic graft revision 3/20/2023, second stage TEVAR 5/5/2023, course c/b peripancreatic/RP hemorrhage/hematoma formation. Suspect fevers due to enlarged collection as seen on CT    Recommend:  1.  Continue Meropenem 1 gram IV q8hrs  2. Continue Caspofungin 50 mg IV daily  3.  Follow up repeat cultures    ID team 1 will follow

## 2023-05-29 NOTE — PROGRESS NOTE ADULT - SUBJECTIVE AND OBJECTIVE BOX
INTERVAL HPI/OVERNIGHT EVENTS:    Patient was seen and examined at bedside earlier today. On pressors during CVVHD.  + fevers    ROS:    unable to obtain         ANTIBIOTICS/RELEVANT:    MEDICATIONS  (STANDING):  albuterol/ipratropium for Nebulization 3 milliLiter(s) Nebulizer every 6 hours  artificial  tears Solution 1 Drop(s) Both EYES two times a day  aspirin  chewable 81 milliGRAM(s) Oral daily  caspofungin IVPB 50 milliGRAM(s) IV Intermittent every 24 hours  caspofungin IVPB      chlorhexidine 0.12% Liquid 15 milliLiter(s) Oral Mucosa every 12 hours  chlorhexidine 2% Cloths 1 Application(s) Topical daily  dexMEDEtomidine Infusion 0.2 MICROgram(s)/kG/Hr (3.5 mL/Hr) IV Continuous <Continuous>  dextrose 5%. 1000 milliLiter(s) (50 mL/Hr) IV Continuous <Continuous>  dextrose 5%. 1000 milliLiter(s) (100 mL/Hr) IV Continuous <Continuous>  dextrose 50% Injectable 25 Gram(s) IV Push once  dextrose 50% Injectable 12.5 Gram(s) IV Push once  dextrose 50% Injectable 25 Gram(s) IV Push once  glucagon  Injectable 1 milliGRAM(s) IntraMuscular once  heparin   Injectable 5000 Unit(s) SubCutaneous every 8 hours  insulin lispro (ADMELOG) corrective regimen sliding scale   SubCutaneous every 6 hours  lacosamide IVPB 250 milliGRAM(s) IV Intermittent every 12 hours  lipid, fat emulsion (Fish Oil and Plant Based) 20% Infusion 0.7 Gm/kG/Day (20.83 mL/Hr) IV Continuous <Continuous>  meropenem  IVPB 1000 milliGRAM(s) IV Intermittent every 8 hours  norepinephrine Infusion 0.05 MICROgram(s)/kG/Min (3.28 mL/Hr) IV Continuous <Continuous>  pantoprazole  Injectable 40 milliGRAM(s) IV Push two times a day  Parenteral Nutrition - Adult 1 Each (70 mL/Hr) TPN Continuous <Continuous>  Parenteral Nutrition - Adult 1 Each (70 mL/Hr) TPN Continuous <Continuous>    MEDICATIONS  (PRN):  dextrose Oral Gel 15 Gram(s) Oral once PRN Blood Glucose LESS THAN 70 milliGRAM(s)/deciliter  oxyCODONE    Solution 10 milliGRAM(s) Oral every 4 hours PRN Severe Pain (7 - 10)  oxyCODONE    Solution 5 milliGRAM(s) Enteral Tube every 4 hours PRN Moderate Pain (4 - 6)        Vital Signs Last 24 Hrs  T(C): 37.9 (29 May 2023 14:00), Max: 39 (29 May 2023 12:00)  T(F): 100.2 (29 May 2023 14:00), Max: 102.2 (29 May 2023 12:00)  HR: 91 (29 May 2023 16:00) (80 - 125)  BP: --  BP(mean): --  RR: 21 (29 May 2023 16:00) (17 - 34)  SpO2: 100% (29 May 2023 16:00) (91% - 100%)    Parameters below as of 29 May 2023 16:00  Patient On (Oxygen Delivery Method): ventilator, cpap    O2 Concentration (%): 40    PHYSICAL EXAM:  Constitutional:  intubated  Eyes:IVAN, EOMI  Ear/Nose/Throat: no oral lesion, no sinus tenderness on percussion	  Neck:  supple  Respiratory: CTA marti  Cardiovascular: S1S2 RRR, no murmurs  Gastrointestinal:soft, (+) BS, no HSM, multiple drains in place   Extremities:no e/e/c  Vascular: DP Pulse:	right normal; left normal      LABS:                        9.6    16.47 )-----------( 72       ( 29 May 2023 05:30 )             27.7     05-29    134<L>  |  103  |  29<H>  ----------------------------<  139<H>  4.3   |  23  |  0.75    Ca    8.0<L>      29 May 2023 05:30  Phos  4.1     05-29  Mg     2.3     05-29    TPro  5.6<L>  /  Alb  2.2<L>  /  TBili  2.8<H>  /  DBili  2.0<H>  /  AST  28  /  ALT  13  /  AlkPhos  347<H>  05-29    PT/INR - ( 29 May 2023 05:30 )   PT: 15.1 sec;   INR: 1.27          PTT - ( 29 May 2023 05:30 )  PTT:35.8 sec      MICROBIOLOGY:    Culture - Blood (05.27.23 @ 15:12)    Specimen Source: .Blood Blood-Peripheral   Culture Results:   No growth at 2 days.        RADIOLOGY & ADDITIONAL STUDIES:    < from: CT Abdomen and Pelvis w/ Oral Cont (05.29.23 @ 10:31) >    PANCREAS: Hyperdense collection is seen in the uncinate process (3-90)   measuring 3.1 x 2.1 cm.  There are multiple encapsulated and poorly encapsulated collections as   follows:  1.  LEFT upper quadrant, tail of pancreas collection: Poorly defined   collection is seen within the tail of the pancreas which appears slightly   decreased in size when compared to the prior study.  2.  LEFT upper quadrant collection: There is a drain within a collection   within the LEFT lateral aspect of the LEFT upper quadrant lateral to the   colon. This collection contains apigtail drain, and the collection has   increased in size measuring 8.9 x 4.5 x 7.3 cm. This previously measured   4.0 x 1.6 x 4.5 cm. This increase in size suggests that the pigtail   catheter drainage tube is either blocked or nonfunctional.  3.  LEFT posterior paracolic collection: Posterior to the LEFT colon and   inferior to the upper collection, there is a second collection containing   a pigtail drain. This collection measures 5.2 x 9.2 x 8.2 cm. This   previously measured 11.2 x 6.7 x 9.1cm and therefore has decreased in   size.  4.  RIGHT paracolic collection: Pigtail catheter is seen lateral to the   ascending colon in the RIGHT paracolic location, there is no residual   fluid seen at this site.  5.  CENTRAL deep pelvic on encapsulated fluid: There are 2 drains seen   with in and on encapsulated fluid collection within the deep pelvis.   These drainage catheters appear to be draining unencapsulated hemorrhagic   ascites.    < end of copied text >

## 2023-05-30 ENCOUNTER — TRANSCRIPTION ENCOUNTER (OUTPATIENT)
Age: 53
End: 2023-05-30

## 2023-05-30 LAB
ALBUMIN SERPL ELPH-MCNC: 2 G/DL — LOW (ref 3.3–5)
ALP SERPL-CCNC: 183 U/L — HIGH (ref 40–120)
ALT FLD-CCNC: 9 U/L — LOW (ref 10–45)
ANION GAP SERPL CALC-SCNC: 10 MMOL/L — SIGNIFICANT CHANGE UP (ref 5–17)
ANION GAP SERPL CALC-SCNC: 6 MMOL/L — SIGNIFICANT CHANGE UP (ref 5–17)
ANION GAP SERPL CALC-SCNC: 7 MMOL/L — SIGNIFICANT CHANGE UP (ref 5–17)
ANION GAP SERPL CALC-SCNC: 8 MMOL/L — SIGNIFICANT CHANGE UP (ref 5–17)
ANION GAP SERPL CALC-SCNC: 9 MMOL/L — SIGNIFICANT CHANGE UP (ref 5–17)
APTT BLD: 34.8 SEC — SIGNIFICANT CHANGE UP (ref 27.5–35.5)
APTT BLD: 37.6 SEC — HIGH (ref 27.5–35.5)
AST SERPL-CCNC: 19 U/L — SIGNIFICANT CHANGE UP (ref 10–40)
BASE EXCESS BLDA CALC-SCNC: -0.7 MMOL/L — SIGNIFICANT CHANGE UP (ref -2–3)
BASE EXCESS BLDV CALC-SCNC: -1.5 MMOL/L — SIGNIFICANT CHANGE UP (ref -2–3)
BILIRUB DIRECT SERPL-MCNC: 1.3 MG/DL — HIGH (ref 0–0.3)
BILIRUB INDIRECT FLD-MCNC: 0.7 MG/DL — SIGNIFICANT CHANGE UP (ref 0.2–1)
BILIRUB SERPL-MCNC: 2.1 MG/DL — HIGH (ref 0.2–1.2)
BLD GP AB SCN SERPL QL: NEGATIVE — SIGNIFICANT CHANGE UP
BUN SERPL-MCNC: 28 MG/DL — HIGH (ref 7–23)
BUN SERPL-MCNC: 30 MG/DL — HIGH (ref 7–23)
BUN SERPL-MCNC: 30 MG/DL — HIGH (ref 7–23)
BUN SERPL-MCNC: 33 MG/DL — HIGH (ref 7–23)
BUN SERPL-MCNC: 34 MG/DL — HIGH (ref 7–23)
CA-I SERPL-SCNC: 1.22 MMOL/L — SIGNIFICANT CHANGE UP (ref 1.15–1.33)
CALCIUM SERPL-MCNC: 7.7 MG/DL — LOW (ref 8.4–10.5)
CALCIUM SERPL-MCNC: 7.8 MG/DL — LOW (ref 8.4–10.5)
CALCIUM SERPL-MCNC: 7.9 MG/DL — LOW (ref 8.4–10.5)
CHLORIDE SERPL-SCNC: 100 MMOL/L — SIGNIFICANT CHANGE UP (ref 96–108)
CHLORIDE SERPL-SCNC: 102 MMOL/L — SIGNIFICANT CHANGE UP (ref 96–108)
CHLORIDE SERPL-SCNC: 102 MMOL/L — SIGNIFICANT CHANGE UP (ref 96–108)
CHLORIDE SERPL-SCNC: 103 MMOL/L — SIGNIFICANT CHANGE UP (ref 96–108)
CHLORIDE SERPL-SCNC: 104 MMOL/L — SIGNIFICANT CHANGE UP (ref 96–108)
CO2 BLDA-SCNC: 24 MMOL/L — SIGNIFICANT CHANGE UP (ref 19–24)
CO2 BLDV-SCNC: 25 MMOL/L — SIGNIFICANT CHANGE UP (ref 22–26)
CO2 SERPL-SCNC: 21 MMOL/L — LOW (ref 22–31)
CO2 SERPL-SCNC: 21 MMOL/L — LOW (ref 22–31)
CO2 SERPL-SCNC: 23 MMOL/L — SIGNIFICANT CHANGE UP (ref 22–31)
CO2 SERPL-SCNC: 23 MMOL/L — SIGNIFICANT CHANGE UP (ref 22–31)
CO2 SERPL-SCNC: 24 MMOL/L — SIGNIFICANT CHANGE UP (ref 22–31)
CREAT SERPL-MCNC: 0.75 MG/DL — SIGNIFICANT CHANGE UP (ref 0.5–1.3)
CREAT SERPL-MCNC: 0.77 MG/DL — SIGNIFICANT CHANGE UP (ref 0.5–1.3)
CREAT SERPL-MCNC: 0.77 MG/DL — SIGNIFICANT CHANGE UP (ref 0.5–1.3)
CREAT SERPL-MCNC: 0.83 MG/DL — SIGNIFICANT CHANGE UP (ref 0.5–1.3)
CREAT SERPL-MCNC: 0.87 MG/DL — SIGNIFICANT CHANGE UP (ref 0.5–1.3)
EGFR: 103 ML/MIN/1.73M2 — SIGNIFICANT CHANGE UP
EGFR: 104 ML/MIN/1.73M2 — SIGNIFICANT CHANGE UP
EGFR: 106 ML/MIN/1.73M2 — SIGNIFICANT CHANGE UP
EGFR: 106 ML/MIN/1.73M2 — SIGNIFICANT CHANGE UP
EGFR: 107 ML/MIN/1.73M2 — SIGNIFICANT CHANGE UP
GAS PNL BLDA: SIGNIFICANT CHANGE UP
GAS PNL BLDV: 133 MMOL/L — LOW (ref 136–145)
GAS PNL BLDV: SIGNIFICANT CHANGE UP
GAS PNL BLDV: SIGNIFICANT CHANGE UP
GLUCOSE BLDC GLUCOMTR-MCNC: 123 MG/DL — HIGH (ref 70–99)
GLUCOSE BLDC GLUCOMTR-MCNC: 140 MG/DL — HIGH (ref 70–99)
GLUCOSE SERPL-MCNC: 123 MG/DL — HIGH (ref 70–99)
GLUCOSE SERPL-MCNC: 140 MG/DL — HIGH (ref 70–99)
GLUCOSE SERPL-MCNC: 140 MG/DL — HIGH (ref 70–99)
GLUCOSE SERPL-MCNC: 144 MG/DL — HIGH (ref 70–99)
GLUCOSE SERPL-MCNC: 146 MG/DL — HIGH (ref 70–99)
GRAM STN FLD: SIGNIFICANT CHANGE UP
HCO3 BLDA-SCNC: 23 MMOL/L — SIGNIFICANT CHANGE UP (ref 21–28)
HCO3 BLDV-SCNC: 24 MMOL/L — SIGNIFICANT CHANGE UP (ref 22–29)
HCT VFR BLD CALC: 19.7 % — CRITICAL LOW (ref 39–50)
HCT VFR BLD CALC: 22.1 % — LOW (ref 39–50)
HCT VFR BLD CALC: 24.7 % — LOW (ref 39–50)
HGB BLD-MCNC: 6.6 G/DL — CRITICAL LOW (ref 13–17)
HGB BLD-MCNC: 7.5 G/DL — LOW (ref 13–17)
HGB BLD-MCNC: 8.4 G/DL — LOW (ref 13–17)
INR BLD: 1.34 — HIGH (ref 0.88–1.16)
INR BLD: 1.42 — HIGH (ref 0.88–1.16)
LACTATE SERPL-SCNC: 1.4 MMOL/L — SIGNIFICANT CHANGE UP (ref 0.5–2)
MAGNESIUM SERPL-MCNC: 2.2 MG/DL — SIGNIFICANT CHANGE UP (ref 1.6–2.6)
MAGNESIUM SERPL-MCNC: 2.3 MG/DL — SIGNIFICANT CHANGE UP (ref 1.6–2.6)
MAGNESIUM SERPL-MCNC: 2.3 MG/DL — SIGNIFICANT CHANGE UP (ref 1.6–2.6)
MCHC RBC-ENTMCNC: 30.4 PG — SIGNIFICANT CHANGE UP (ref 27–34)
MCHC RBC-ENTMCNC: 30.5 PG — SIGNIFICANT CHANGE UP (ref 27–34)
MCHC RBC-ENTMCNC: 30.7 PG — SIGNIFICANT CHANGE UP (ref 27–34)
MCHC RBC-ENTMCNC: 33.5 GM/DL — SIGNIFICANT CHANGE UP (ref 32–36)
MCHC RBC-ENTMCNC: 33.9 GM/DL — SIGNIFICANT CHANGE UP (ref 32–36)
MCHC RBC-ENTMCNC: 34 GM/DL — SIGNIFICANT CHANGE UP (ref 32–36)
MCV RBC AUTO: 89.5 FL — SIGNIFICANT CHANGE UP (ref 80–100)
MCV RBC AUTO: 89.8 FL — SIGNIFICANT CHANGE UP (ref 80–100)
MCV RBC AUTO: 91.6 FL — SIGNIFICANT CHANGE UP (ref 80–100)
NRBC # BLD: 0 /100 WBCS — SIGNIFICANT CHANGE UP (ref 0–0)
PCO2 BLDA: 36 MMHG — SIGNIFICANT CHANGE UP (ref 35–48)
PCO2 BLDV: 41 MMHG — LOW (ref 42–55)
PH BLDA: 7.42 — SIGNIFICANT CHANGE UP (ref 7.35–7.45)
PH BLDV: 7.37 — SIGNIFICANT CHANGE UP (ref 7.32–7.43)
PHOSPHATE SERPL-MCNC: 4.6 MG/DL — HIGH (ref 2.5–4.5)
PHOSPHATE SERPL-MCNC: 4.7 MG/DL — HIGH (ref 2.5–4.5)
PHOSPHATE SERPL-MCNC: 4.7 MG/DL — HIGH (ref 2.5–4.5)
PHOSPHATE SERPL-MCNC: 5 MG/DL — HIGH (ref 2.5–4.5)
PHOSPHATE SERPL-MCNC: 5.2 MG/DL — HIGH (ref 2.5–4.5)
PLATELET # BLD AUTO: 39 K/UL — LOW (ref 150–400)
PLATELET # BLD AUTO: 51 K/UL — LOW (ref 150–400)
PLATELET # BLD AUTO: 56 K/UL — LOW (ref 150–400)
PO2 BLDA: 122 MMHG — HIGH (ref 83–108)
PO2 BLDV: 42 MMHG — SIGNIFICANT CHANGE UP (ref 25–45)
POTASSIUM BLDV-SCNC: 4.5 MMOL/L — SIGNIFICANT CHANGE UP (ref 3.5–5.1)
POTASSIUM SERPL-MCNC: 3.9 MMOL/L — SIGNIFICANT CHANGE UP (ref 3.5–5.3)
POTASSIUM SERPL-MCNC: 4 MMOL/L — SIGNIFICANT CHANGE UP (ref 3.5–5.3)
POTASSIUM SERPL-MCNC: 4.1 MMOL/L — SIGNIFICANT CHANGE UP (ref 3.5–5.3)
POTASSIUM SERPL-MCNC: 4.2 MMOL/L — SIGNIFICANT CHANGE UP (ref 3.5–5.3)
POTASSIUM SERPL-MCNC: 4.4 MMOL/L — SIGNIFICANT CHANGE UP (ref 3.5–5.3)
POTASSIUM SERPL-SCNC: 3.9 MMOL/L — SIGNIFICANT CHANGE UP (ref 3.5–5.3)
POTASSIUM SERPL-SCNC: 4 MMOL/L — SIGNIFICANT CHANGE UP (ref 3.5–5.3)
POTASSIUM SERPL-SCNC: 4.1 MMOL/L — SIGNIFICANT CHANGE UP (ref 3.5–5.3)
POTASSIUM SERPL-SCNC: 4.2 MMOL/L — SIGNIFICANT CHANGE UP (ref 3.5–5.3)
POTASSIUM SERPL-SCNC: 4.4 MMOL/L — SIGNIFICANT CHANGE UP (ref 3.5–5.3)
PROT SERPL-MCNC: 5.1 G/DL — LOW (ref 6–8.3)
PROTHROM AB SERPL-ACNC: 16 SEC — HIGH (ref 10.5–13.4)
PROTHROM AB SERPL-ACNC: 16.9 SEC — HIGH (ref 10.5–13.4)
RBC # BLD: 2.15 M/UL — LOW (ref 4.2–5.8)
RBC # BLD: 2.46 M/UL — LOW (ref 4.2–5.8)
RBC # BLD: 2.76 M/UL — LOW (ref 4.2–5.8)
RBC # FLD: 16.4 % — HIGH (ref 10.3–14.5)
RBC # FLD: 16.5 % — HIGH (ref 10.3–14.5)
RBC # FLD: 16.6 % — HIGH (ref 10.3–14.5)
RH IG SCN BLD-IMP: POSITIVE — SIGNIFICANT CHANGE UP
SAO2 % BLDA: 98.8 % — HIGH (ref 94–98)
SAO2 % BLDV: 66.3 % — LOW (ref 67–88)
SODIUM SERPL-SCNC: 130 MMOL/L — LOW (ref 135–145)
SODIUM SERPL-SCNC: 131 MMOL/L — LOW (ref 135–145)
SODIUM SERPL-SCNC: 132 MMOL/L — LOW (ref 135–145)
SODIUM SERPL-SCNC: 134 MMOL/L — LOW (ref 135–145)
SODIUM SERPL-SCNC: 136 MMOL/L — SIGNIFICANT CHANGE UP (ref 135–145)
SPECIMEN SOURCE: SIGNIFICANT CHANGE UP
WBC # BLD: 10.85 K/UL — HIGH (ref 3.8–10.5)
WBC # BLD: 13.58 K/UL — HIGH (ref 3.8–10.5)
WBC # BLD: 15.33 K/UL — HIGH (ref 3.8–10.5)
WBC # FLD AUTO: 10.85 K/UL — HIGH (ref 3.8–10.5)
WBC # FLD AUTO: 13.58 K/UL — HIGH (ref 3.8–10.5)
WBC # FLD AUTO: 15.33 K/UL — HIGH (ref 3.8–10.5)

## 2023-05-30 PROCEDURE — 99232 SBSQ HOSP IP/OBS MODERATE 35: CPT | Mod: 57

## 2023-05-30 PROCEDURE — 49406 IMAGE CATH FLUID PERI/RETRO: CPT

## 2023-05-30 PROCEDURE — 71045 X-RAY EXAM CHEST 1 VIEW: CPT | Mod: 26,77

## 2023-05-30 RX ORDER — HYDROMORPHONE HYDROCHLORIDE 2 MG/ML
1 INJECTION INTRAMUSCULAR; INTRAVENOUS; SUBCUTANEOUS EVERY 6 HOURS
Refills: 0 | Status: DISCONTINUED | OUTPATIENT
Start: 2023-05-30 | End: 2023-05-31

## 2023-05-30 RX ORDER — HYDROMORPHONE HYDROCHLORIDE 2 MG/ML
1 INJECTION INTRAMUSCULAR; INTRAVENOUS; SUBCUTANEOUS EVERY 4 HOURS
Refills: 0 | Status: DISCONTINUED | OUTPATIENT
Start: 2023-05-30 | End: 2023-05-31

## 2023-05-30 RX ORDER — PROPOFOL 10 MG/ML
10 INJECTION, EMULSION INTRAVENOUS
Qty: 1000 | Refills: 0 | Status: DISCONTINUED | OUTPATIENT
Start: 2023-05-30 | End: 2023-05-30

## 2023-05-30 RX ORDER — ACETAMINOPHEN 500 MG
1000 TABLET ORAL ONCE
Refills: 0 | Status: COMPLETED | OUTPATIENT
Start: 2023-05-30 | End: 2023-05-30

## 2023-05-30 RX ORDER — I.V. FAT EMULSION 20 G/100ML
0.71 EMULSION INTRAVENOUS
Qty: 50 | Refills: 0 | Status: DISCONTINUED | OUTPATIENT
Start: 2023-05-30 | End: 2023-05-30

## 2023-05-30 RX ORDER — SODIUM CHLORIDE 9 MG/ML
4 INJECTION INTRAMUSCULAR; INTRAVENOUS; SUBCUTANEOUS EVERY 6 HOURS
Refills: 0 | Status: DISCONTINUED | OUTPATIENT
Start: 2023-05-30 | End: 2023-05-30

## 2023-05-30 RX ORDER — HEPARIN SODIUM 5000 [USP'U]/ML
5000 INJECTION INTRAVENOUS; SUBCUTANEOUS EVERY 8 HOURS
Refills: 0 | Status: DISCONTINUED | OUTPATIENT
Start: 2023-05-30 | End: 2023-05-30

## 2023-05-30 RX ORDER — HYDROMORPHONE HYDROCHLORIDE 2 MG/ML
0.5 INJECTION INTRAMUSCULAR; INTRAVENOUS; SUBCUTANEOUS EVERY 6 HOURS
Refills: 0 | Status: DISCONTINUED | OUTPATIENT
Start: 2023-05-30 | End: 2023-05-31

## 2023-05-30 RX ORDER — PROPOFOL 10 MG/ML
10 INJECTION, EMULSION INTRAVENOUS
Qty: 500 | Refills: 0 | Status: DISCONTINUED | OUTPATIENT
Start: 2023-05-30 | End: 2023-05-30

## 2023-05-30 RX ORDER — ACETYLCYSTEINE 200 MG/ML
4 VIAL (ML) MISCELLANEOUS EVERY 6 HOURS
Refills: 0 | Status: DISCONTINUED | OUTPATIENT
Start: 2023-05-30 | End: 2023-05-31

## 2023-05-30 RX ORDER — I.V. FAT EMULSION 20 G/100ML
0.7 EMULSION INTRAVENOUS
Qty: 50 | Refills: 0 | Status: DISCONTINUED | OUTPATIENT
Start: 2023-05-30 | End: 2023-05-30

## 2023-05-30 RX ORDER — SODIUM CHLORIDE 9 MG/ML
4 INJECTION INTRAMUSCULAR; INTRAVENOUS; SUBCUTANEOUS EVERY 6 HOURS
Refills: 0 | Status: DISCONTINUED | OUTPATIENT
Start: 2023-05-30 | End: 2023-05-31

## 2023-05-30 RX ORDER — ELECTROLYTE SOLUTION,INJ
1 VIAL (ML) INTRAVENOUS
Refills: 0 | Status: DISCONTINUED | OUTPATIENT
Start: 2023-05-30 | End: 2023-05-30

## 2023-05-30 RX ADMIN — Medication 1 DROP(S): at 18:26

## 2023-05-30 RX ADMIN — HEPARIN SODIUM 5000 UNIT(S): 5000 INJECTION INTRAVENOUS; SUBCUTANEOUS at 21:20

## 2023-05-30 RX ADMIN — Medication 1 DROP(S): at 05:33

## 2023-05-30 RX ADMIN — OXYCODONE HYDROCHLORIDE 10 MILLIGRAM(S): 5 TABLET ORAL at 08:12

## 2023-05-30 RX ADMIN — SODIUM CHLORIDE 4 MILLILITER(S): 9 INJECTION INTRAMUSCULAR; INTRAVENOUS; SUBCUTANEOUS at 21:02

## 2023-05-30 RX ADMIN — CHLORHEXIDINE GLUCONATE 1 APPLICATION(S): 213 SOLUTION TOPICAL at 05:33

## 2023-05-30 RX ADMIN — DEXMEDETOMIDINE HYDROCHLORIDE IN 0.9% SODIUM CHLORIDE 3.5 MICROGRAM(S)/KG/HR: 4 INJECTION INTRAVENOUS at 12:51

## 2023-05-30 RX ADMIN — Medication 3 MILLILITER(S): at 05:09

## 2023-05-30 RX ADMIN — Medication 4 MILLILITER(S): at 16:43

## 2023-05-30 RX ADMIN — PROPOFOL 4.2 MICROGRAM(S)/KG/MIN: 10 INJECTION, EMULSION INTRAVENOUS at 13:42

## 2023-05-30 RX ADMIN — MEROPENEM 100 MILLIGRAM(S): 1 INJECTION INTRAVENOUS at 05:32

## 2023-05-30 RX ADMIN — HEPARIN SODIUM 5000 UNIT(S): 5000 INJECTION INTRAVENOUS; SUBCUTANEOUS at 05:32

## 2023-05-30 RX ADMIN — SODIUM CHLORIDE 4 MILLILITER(S): 9 INJECTION INTRAMUSCULAR; INTRAVENOUS; SUBCUTANEOUS at 11:34

## 2023-05-30 RX ADMIN — LACOSAMIDE 150 MILLIGRAM(S): 50 TABLET ORAL at 11:13

## 2023-05-30 RX ADMIN — HYDROMORPHONE HYDROCHLORIDE 1 MILLIGRAM(S): 2 INJECTION INTRAMUSCULAR; INTRAVENOUS; SUBCUTANEOUS at 23:20

## 2023-05-30 RX ADMIN — DEXMEDETOMIDINE HYDROCHLORIDE IN 0.9% SODIUM CHLORIDE 3.5 MICROGRAM(S)/KG/HR: 4 INJECTION INTRAVENOUS at 00:46

## 2023-05-30 RX ADMIN — HYDROMORPHONE HYDROCHLORIDE 1 MILLIGRAM(S): 2 INJECTION INTRAMUSCULAR; INTRAVENOUS; SUBCUTANEOUS at 18:25

## 2023-05-30 RX ADMIN — Medication 1000 MILLIGRAM(S): at 21:20

## 2023-05-30 RX ADMIN — HYDROMORPHONE HYDROCHLORIDE 1 MILLIGRAM(S): 2 INJECTION INTRAMUSCULAR; INTRAVENOUS; SUBCUTANEOUS at 19:24

## 2023-05-30 RX ADMIN — Medication 81 MILLIGRAM(S): at 12:21

## 2023-05-30 RX ADMIN — Medication 400 MILLIGRAM(S): at 10:19

## 2023-05-30 RX ADMIN — HYDROMORPHONE HYDROCHLORIDE 1 MILLIGRAM(S): 2 INJECTION INTRAMUSCULAR; INTRAVENOUS; SUBCUTANEOUS at 20:49

## 2023-05-30 RX ADMIN — CHLORHEXIDINE GLUCONATE 15 MILLILITER(S): 213 SOLUTION TOPICAL at 05:32

## 2023-05-30 RX ADMIN — HYDROMORPHONE HYDROCHLORIDE 1 MILLIGRAM(S): 2 INJECTION INTRAMUSCULAR; INTRAVENOUS; SUBCUTANEOUS at 13:56

## 2023-05-30 RX ADMIN — MEROPENEM 100 MILLIGRAM(S): 1 INJECTION INTRAVENOUS at 13:41

## 2023-05-30 RX ADMIN — PANTOPRAZOLE SODIUM 40 MILLIGRAM(S): 20 TABLET, DELAYED RELEASE ORAL at 05:32

## 2023-05-30 RX ADMIN — Medication 400 MILLIGRAM(S): at 20:50

## 2023-05-30 RX ADMIN — CHLORHEXIDINE GLUCONATE 15 MILLILITER(S): 213 SOLUTION TOPICAL at 18:27

## 2023-05-30 RX ADMIN — Medication 1000 MILLIGRAM(S): at 00:15

## 2023-05-30 RX ADMIN — Medication 3.28 MICROGRAM(S)/KG/MIN: at 12:51

## 2023-05-30 RX ADMIN — HYDROMORPHONE HYDROCHLORIDE 1 MILLIGRAM(S): 2 INJECTION INTRAMUSCULAR; INTRAVENOUS; SUBCUTANEOUS at 20:34

## 2023-05-30 RX ADMIN — Medication 400 MILLIGRAM(S): at 00:00

## 2023-05-30 RX ADMIN — OXYCODONE HYDROCHLORIDE 10 MILLIGRAM(S): 5 TABLET ORAL at 07:12

## 2023-05-30 RX ADMIN — CASPOFUNGIN ACETATE 260 MILLIGRAM(S): 7 INJECTION, POWDER, LYOPHILIZED, FOR SOLUTION INTRAVENOUS at 10:13

## 2023-05-30 RX ADMIN — SODIUM CHLORIDE 4 MILLILITER(S): 9 INJECTION INTRAMUSCULAR; INTRAVENOUS; SUBCUTANEOUS at 16:43

## 2023-05-30 RX ADMIN — PANTOPRAZOLE SODIUM 40 MILLIGRAM(S): 20 TABLET, DELAYED RELEASE ORAL at 18:25

## 2023-05-30 RX ADMIN — Medication 3 MILLILITER(S): at 16:42

## 2023-05-30 RX ADMIN — MEROPENEM 100 MILLIGRAM(S): 1 INJECTION INTRAVENOUS at 21:20

## 2023-05-30 RX ADMIN — Medication 1 EACH: at 18:27

## 2023-05-30 RX ADMIN — HYDROMORPHONE HYDROCHLORIDE 1 MILLIGRAM(S): 2 INJECTION INTRAMUSCULAR; INTRAVENOUS; SUBCUTANEOUS at 11:30

## 2023-05-30 RX ADMIN — OXYCODONE HYDROCHLORIDE 10 MILLIGRAM(S): 5 TABLET ORAL at 03:15

## 2023-05-30 RX ADMIN — HYDROMORPHONE HYDROCHLORIDE 1 MILLIGRAM(S): 2 INJECTION INTRAMUSCULAR; INTRAVENOUS; SUBCUTANEOUS at 23:05

## 2023-05-30 RX ADMIN — Medication 4 MILLILITER(S): at 21:02

## 2023-05-30 RX ADMIN — OXYCODONE HYDROCHLORIDE 10 MILLIGRAM(S): 5 TABLET ORAL at 04:15

## 2023-05-30 RX ADMIN — Medication 4 MILLILITER(S): at 11:45

## 2023-05-30 RX ADMIN — LACOSAMIDE 150 MILLIGRAM(S): 50 TABLET ORAL at 23:05

## 2023-05-30 RX ADMIN — Medication 3 MILLILITER(S): at 21:02

## 2023-05-30 RX ADMIN — Medication 1000 MILLIGRAM(S): at 11:30

## 2023-05-30 RX ADMIN — HYDROMORPHONE HYDROCHLORIDE 1 MILLIGRAM(S): 2 INJECTION INTRAMUSCULAR; INTRAVENOUS; SUBCUTANEOUS at 10:12

## 2023-05-30 RX ADMIN — I.V. FAT EMULSION 20.8 GM/KG/DAY: 20 EMULSION INTRAVENOUS at 18:26

## 2023-05-30 RX ADMIN — DEXMEDETOMIDINE HYDROCHLORIDE IN 0.9% SODIUM CHLORIDE 3.5 MICROGRAM(S)/KG/HR: 4 INJECTION INTRAVENOUS at 07:12

## 2023-05-30 RX ADMIN — Medication 3 MILLILITER(S): at 11:29

## 2023-05-30 NOTE — PROGRESS NOTE ADULT - ASSESSMENT
This is a 52yo Male pt with PMH HTN, type A aortic dissection s/p Dacron grafts, AV resuspension in 2013, CAD s/p CABG x 1 SVG to RCA (5/2013 with Dr. Medina), seizure disorder (last episode on 7/4/22) S/P replacement of transverse aortic arch, second stage thoracic endovascular aortic repair, aorto-axillary bypass, AV replacement (bio 23mm), in APr 2023 and CABG x 1 (SVG-RCA) EF 75% (Ignacio, 3/20) now s/p 2nd state TEVAR on 5/5 for whom surgery was consulted for finding of acute pancreatitis with large peripancreatic/perigastric fluid collections on CTA abdomen 5/8 then acute hemorrhage starting 5/12/23 requiring 11 U pRBC but with negative mesenteric angiogram 5/13/23 for whom hepatobiliary surgery was reconsulted for possible hemorrhagic pancreatitis, now with 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     NEURO: Precedex gtt. Oxycodone 5/10 PRN. Hx seizures: s/p epilepsy consult, Cont vimpat. Depakote weaned off due to concerns for drug-related hemorrhagic pancreatitis.   HEENT: Artificial tears  CV: Septic shock: On Levophed for MAP> 65. Echo from 5/19 hyperdynamic LV, SHAJI with LVOTO (gradient 26), normal RV, mild MR, no pulm htn. Cont ASA 81mg.    PULM: intubated 5/13 on AC 40/500/22/8, ARDS now resolved - off NO, s/p multiple Mucus plug/ Lung collapse s/p bronch x3 (most recently on 5/26) most likely from outward obstruction: Cont rotating pt around the clock. Cont Duonebs, & Chest PT q4. CPAP trial 40%/8/8 daily. CT chest 5/29 - with mucus plugging of L mainstem bronchus likely 2/2 extrinisic compression. Bronchoscopy today as per SICU  GI/FEN: NPO on TPN/Lipids,  Holding TF Nepro for potential IR today, restart post procedure. protonix BID, Hemorrhagic pancreatitis: s/p Multiple Drain placements and paracentisis by IR team.  FOBT+ 5/29. Constipation: resolved, now diarrhea: Cont Rectal tube. May consider IR intervention, possible GI intervention today -- pending further discussion.   : TREY on CVVHD. Will attempt to remove fluid today for goal 500-1L negative if BP allows. Nephro following. Cantrell d/c'ed 5/26 - Continue bladder scan daily for poss straight cath.   HEME: Acute blood loss anemia on 5/12 requiring 11units of PRBC. IR negative for mesenteric extrav. s/p 3u pPRBC since transfer to SICU. Plt 50 this AM - HIT panel  negative 3/26.  ENDO: mISS  ID: Chloe (5/21-) Caspo (5/23-) ID following. Kleb bacteremia from 5/15 & 5/17, DC: Ampicillin ( 5/21- 5/27) subsequent bld cx negative. PNA w/ bronch cx kleb, enterobacter (zosyn, erta resistant), E faecalis, strep angionosus from 5/19, pancreatic abscess cx pan-sensitive kleb 5/22.   PPX: SCDs, SQH.   LINES: R Brett kalpana(5/12-) [facility] HD cath (5/22--), [facility] TLC (5/23--) Dc:  [facility] TLC (5/22-5/27),   WOUNDS/DRAINS: left venu-pancreatic abscess CODIE drain x 2, ascites CODIE x 2  PT: PT/OR ordered on 5/28  Dispo: SICU level of care at this time.

## 2023-05-30 NOTE — PROGRESS NOTE ADULT - ATTENDING COMMENTS
I agree with the fellow's findings and plans as written above with the following additions/amendments:    Seen and examined at bedside on CVVHD, tolerating well. sCr low and with anasarca, will decrease DFR and increase UF as above. Further recs as above, continue CVVHD

## 2023-05-30 NOTE — PROGRESS NOTE ADULT - SUBJECTIVE AND OBJECTIVE BOX
Operation / Date:  5/5: second stage TEVAR  3/20: aorto-axillary bypass, AV replacement (bio 23mm), and CABG x 1 (SVG-RCA) EF 75%  2013: hx type A dissection and repair, CABG    SUBJECTIVE ASSESSMENT:  53y Male seen and examined at bedside who is intubated and sedated.     Vital Signs Last 24 Hrs  T(C): 37.8 (30 May 2023 09:00), Max: 39 (29 May 2023 12:00)  T(F): 100.1 (30 May 2023 09:00), Max: 102.2 (29 May 2023 12:00)  HR: 103 (30 May 2023 10:00) (86 - 128)  BP: --  BP(mean): --  RR: 26 (30 May 2023 10:00) (16 - 34)  SpO2: 100% (30 May 2023 10:00) (93% - 100%)    Parameters below as of 30 May 2023 10:00  Patient On (Oxygen Delivery Method): ventilator    O2 Concentration (%): 40  I&O's Detail    29 May 2023 07:01  -  30 May 2023 07:00  --------------------------------------------------------  IN:    Dexmedetomidine: 392.8 mL    Enteral Tube Flush: 210 mL    Fat Emulsion (Fish Oil &amp; Plant Based) 20% Infusion: 291.2 mL    IV PiggyBack: 100 mL    IV PiggyBack: 50 mL    IV PiggyBack: 250 mL    IV PiggyBack: 150 mL    Nepro with Carb Steady: 100 mL    Norepinephrine: 85.7 mL    TPN (Total Parenteral Nutrition): 1540 mL  Total IN: 3169.7 mL    OUT:    Bulb (mL): 85 mL    Bulb (mL): 70 mL    Drain (mL): 160 mL    Drain (mL): 110 mL    Drain (mL): 360 mL    Drain (mL): 40 mL    Fat Emulsion (Fish Oil &amp; Plant Based) 20% Infusion: 0 mL    Intermittent Catheterization - Urethral (mL): 10 mL    Other (mL): 2332 mL    Voided (mL): 15 mL  Total OUT: 3182 mL    Total NET: -12.3 mL    30 May 2023 07:01  -  30 May 2023 10:48  --------------------------------------------------------  IN:    Dexmedetomidine: 31.6 mL    TPN (Total Parenteral Nutrition): 140 mL  Total IN: 171.6 mL    OUT:    Drain (mL): 50 mL    Other (mL): 280 mL  Total OUT: 330 mL    Total NET: -158.4 mL    PHYSICAL EXAM:  GEN: NAD, looks comfortable  Neuro: intubated and sedated   HEENT: intubated   CV: S1S2, regular  Lungs: b/l crackles appreciated at bases   ABD: Soft, non-distended.   EXT: Warm and well perfused. 2+ peripheral edema noted  Incisions: Old MSI is healed, all other incisions appear to be healing well.     LABS:                        8.4    15.33 )-----------( 56       ( 30 May 2023 05:04 )             24.7       PT/INR - ( 30 May 2023 05:04 )   PT: 16.9 sec;   INR: 1.42          PTT - ( 30 May 2023 05:04 )  PTT:37.6 sec    05-30    131<L>  |  100  |  28<H>  ----------------------------<  140<H>  4.0   |  23  |  0.77    Ca    7.7<L>      30 May 2023 05:04  Phos  4.7     05-30  Mg     2.2     05-30    TPro  5.1<L>  /  Alb  2.0<L>  /  TBili  2.1<H>  /  DBili  1.3<H>  /  AST  19  /  ALT  9<L>  /  AlkPhos  183<H>  05-30    MEDICATIONS  (STANDING):  acetylcysteine 20%  Inhalation 4 milliLiter(s) Inhalation every 6 hours  albuterol/ipratropium for Nebulization 3 milliLiter(s) Nebulizer every 6 hours  artificial  tears Solution 1 Drop(s) Both EYES two times a day  aspirin  chewable 81 milliGRAM(s) Oral daily  caspofungin IVPB      caspofungin IVPB 50 milliGRAM(s) IV Intermittent every 24 hours  chlorhexidine 0.12% Liquid 15 milliLiter(s) Oral Mucosa every 12 hours  chlorhexidine 2% Cloths 1 Application(s) Topical daily  CRRT Treatment    <Continuous>  dexMEDEtomidine Infusion 0.2 MICROgram(s)/kG/Hr (3.5 mL/Hr) IV Continuous <Continuous>  dextrose 5%. 1000 milliLiter(s) (50 mL/Hr) IV Continuous <Continuous>  dextrose 5%. 1000 milliLiter(s) (100 mL/Hr) IV Continuous <Continuous>  dextrose 50% Injectable 25 Gram(s) IV Push once  dextrose 50% Injectable 12.5 Gram(s) IV Push once  dextrose 50% Injectable 25 Gram(s) IV Push once  glucagon  Injectable 1 milliGRAM(s) IntraMuscular once  heparin   Injectable 5000 Unit(s) SubCutaneous every 8 hours  HYDROmorphone  Injectable 1 milliGRAM(s) IV Push every 4 hours  insulin lispro (ADMELOG) corrective regimen sliding scale   SubCutaneous every 6 hours  lacosamide IVPB 250 milliGRAM(s) IV Intermittent every 12 hours  lipid, fat emulsion (Fish Oil and Plant Based) 20% Infusion 0.7 Gm/kG/Day (20.83 mL/Hr) IV Continuous <Continuous>  lipid, fat emulsion (Fish Oil and Plant Based) 20% Infusion 0.7 Gm/kG/Day (20.83 mL/Hr) IV Continuous <Continuous>  meropenem  IVPB 1000 milliGRAM(s) IV Intermittent every 8 hours  norepinephrine Infusion 0.05 MICROgram(s)/kG/Min (3.28 mL/Hr) IV Continuous <Continuous>  pantoprazole  Injectable 40 milliGRAM(s) IV Push two times a day  Parenteral Nutrition - Adult 1 Each (70 mL/Hr) TPN Continuous <Continuous>  Parenteral Nutrition - Adult 1 Each (70 mL/Hr) TPN Continuous <Continuous>  Phoxillum Filtration BK 4 / 2.5 5000 milliLiter(s) (1500 mL/Hr) CRRT <Continuous>  sodium chloride 3%  Inhalation 4 milliLiter(s) Inhalation every 6 hours    MEDICATIONS  (PRN):  dextrose Oral Gel 15 Gram(s) Oral once PRN Blood Glucose LESS THAN 70 milliGRAM(s)/deciliter  HYDROmorphone  Injectable 0.5 milliGRAM(s) IV Push every 6 hours PRN Moderate Pain (4 - 6)  HYDROmorphone  Injectable 1 milliGRAM(s) IV Push every 6 hours PRN Severe Pain (7 - 10)    RADIOLOGY & ADDITIONAL TESTS:  < from: CT Chest No Cont (05.29.23 @ 10:31) >  IMPRESSION:  1.  Extensive mucous and debris within the LEFT mainstem bronchus with   complete atelectasis of the LEFT lower lobe.  2.  There are multiple collections within the peritoneal cavity and   retroperitoneum:  *  Most concerning is a LEFT upper quadrant collection with a pigtail   catheter in place which has increased in size suggesting that the pigtail   catheter drainage blocked or nonfunctional.  *  The remaining collections are either stable or decreased in size and   described in detail above.  *  Stable subcapsular splenic collection isless well seen on the current   noncontrast study.    --- End of Report ---    < end of copied text >

## 2023-05-30 NOTE — PROGRESS NOTE ADULT - ASSESSMENT
IMPRESSION:  53 year old male hx seizure disorder, aortic dissection s/p AVR, aortic graft revision 3/20/2023, second stage TEVAR 5/5/2023, course c/b peripancreatic/RP hemorrhage/hematoma formation. Suspect fevers due to enlarged collection as seen on CT    Recommend:  1.  Continue Meropenem 1 gram IV q8hrs  2.  Ok to stop Caspofungin given no evidence of fungal infection  3.  Follow up repeat cultures    ID team 1 will follow

## 2023-05-30 NOTE — PROGRESS NOTE ADULT - SUBJECTIVE AND OBJECTIVE BOX
ON: Occult blood +. NPO @mn. T100.8, tylenol given.    SUBJECTIVE: Pt seen and examined at bedside this am by ICU team. Patient is lying comfortably in bed.    MEDICATIONS  (STANDING):  albuterol/ipratropium for Nebulization 3 milliLiter(s) Nebulizer every 6 hours  artificial  tears Solution 1 Drop(s) Both EYES two times a day  aspirin  chewable 81 milliGRAM(s) Oral daily  caspofungin IVPB 50 milliGRAM(s) IV Intermittent every 24 hours  caspofungin IVPB      chlorhexidine 0.12% Liquid 15 milliLiter(s) Oral Mucosa every 12 hours  chlorhexidine 2% Cloths 1 Application(s) Topical daily  dexMEDEtomidine Infusion 0.2 MICROgram(s)/kG/Hr (3.5 mL/Hr) IV Continuous <Continuous>  dextrose 5%. 1000 milliLiter(s) (50 mL/Hr) IV Continuous <Continuous>  dextrose 5%. 1000 milliLiter(s) (100 mL/Hr) IV Continuous <Continuous>  dextrose 50% Injectable 25 Gram(s) IV Push once  dextrose 50% Injectable 12.5 Gram(s) IV Push once  dextrose 50% Injectable 25 Gram(s) IV Push once  glucagon  Injectable 1 milliGRAM(s) IntraMuscular once  heparin   Injectable 5000 Unit(s) SubCutaneous every 8 hours  insulin lispro (ADMELOG) corrective regimen sliding scale   SubCutaneous every 6 hours  lacosamide IVPB 250 milliGRAM(s) IV Intermittent every 12 hours  lipid, fat emulsion (Fish Oil and Plant Based) 20% Infusion 0.7 Gm/kG/Day (20.83 mL/Hr) IV Continuous <Continuous>  meropenem  IVPB 1000 milliGRAM(s) IV Intermittent every 8 hours  norepinephrine Infusion 0.05 MICROgram(s)/kG/Min (3.28 mL/Hr) IV Continuous <Continuous>  pantoprazole  Injectable 40 milliGRAM(s) IV Push two times a day  Parenteral Nutrition - Adult 1 Each (70 mL/Hr) TPN Continuous <Continuous>    MEDICATIONS  (PRN):  dextrose Oral Gel 15 Gram(s) Oral once PRN Blood Glucose LESS THAN 70 milliGRAM(s)/deciliter  oxyCODONE    Solution 10 milliGRAM(s) Oral every 4 hours PRN Severe Pain (7 - 10)  oxyCODONE    Solution 5 milliGRAM(s) Enteral Tube every 4 hours PRN Moderate Pain (4 - 6)      Drips:     ICU Vital Signs Last 24 Hrs  T(C): 37.8 (30 May 2023 05:14), Max: 39 (29 May 2023 12:00)  T(F): 100 (30 May 2023 05:14), Max: 102.2 (29 May 2023 12:00)  HR: 101 (30 May 2023 06:00) (86 - 121)  ABP: 111/63 (30 May 2023 06:00) (78/48 - 151/91)  ABP(mean): 83 (30 May 2023 06:00) (58 - 118)  RR: 22 (30 May 2023 06:00) (16 - 34)  SpO2: 100% (30 May 2023 06:00) (91% - 100%)    O2 Parameters below as of 30 May 2023 06:00  Patient On (Oxygen Delivery Method): ventilator, cpap    O2 Concentration (%): 40      Physical Exam:  General: NAD  HEENT: NC/AT, EOMI, PERRLA, normal hearing, no oral lesions, neck supple w/o LAD  Pulmonary: Nonlabored breathing, no respiratory distress, CTA-B  Cardiovascular: NSR, no murmurs  Abdominal: soft, NT/ND, +BS, no organomegaly  Extremities: WWP, 5/5 strength x 4, no clubbing/cyanosis/edema  Neuro: A/O x3, CNs II-XII grossly intact, normal motor/sensation, no focal deficits  Pulses: palpable distal pulses    Lines/tubes/drains:  Cantrell: N/A      Vent settings:  CPAP with PS, FiO2: 40, PEEP: 5, PS: 8, MAP: 7.9, PIP: 13    I&O's Summary    28 May 2023 07:01  -  29 May 2023 07:00  --------------------------------------------------------  IN: 4336.1 mL / OUT: 3861 mL / NET: 475.1 mL    29 May 2023 07:01  -  30 May 2023 06:35  --------------------------------------------------------  IN: 3169.7 mL / OUT: 1970 mL / NET: 1199.7 mL        LABS:                        8.4    15.33 )-----------( 56       ( 30 May 2023 05:04 )             24.7     05-30    131<L>  |  100  |  28<H>  ----------------------------<  140<H>  4.0   |  23  |  0.77    Ca    7.7<L>      30 May 2023 05:04  Phos  4.7     05-30  Mg     2.2     05-30    TPro  5.1<L>  /  Alb  2.0<L>  /  TBili  2.1<H>  /  DBili  1.3<H>  /  AST  19  /  ALT  9<L>  /  AlkPhos  183<H>  05-30    PT/INR - ( 30 May 2023 05:04 )   PT: 16.9 sec;   INR: 1.42          PTT - ( 30 May 2023 05:04 )  PTT:37.6 sec    CAPILLARY BLOOD GLUCOSE      POCT Blood Glucose.: 102 mg/dL (29 May 2023 18:29)  POCT Blood Glucose.: 105 mg/dL (29 May 2023 11:16)    LIVER FUNCTIONS - ( 30 May 2023 05:04 )  Alb: 2.0 g/dL / Pro: 5.1 g/dL / ALK PHOS: 183 U/L / ALT: 9 U/L / AST: 19 U/L / GGT: x             Cultures:Culture Results:   No growth at 12 hours (05-29 @ 11:26)  Culture Results:   No growth at 12 hours (05-29 @ 11:26)      RADIOLOGY & ADDITIONAL STUDIES:     ON: Occult blood +. NPO@MN. T100.8, Tylenol given.    SUBJECTIVE: Patient seen and examined at bedside this AM by ICU team. Patient is lying in bed. Reports he is having significant abdominal pain this AM. Denies nausea or emesis.     MEDICATIONS  (STANDING):  albuterol/ipratropium for Nebulization 3 milliLiter(s) Nebulizer every 6 hours  artificial  tears Solution 1 Drop(s) Both EYES two times a day  aspirin  chewable 81 milliGRAM(s) Oral daily  caspofungin IVPB 50 milliGRAM(s) IV Intermittent every 24 hours  caspofungin IVPB      chlorhexidine 0.12% Liquid 15 milliLiter(s) Oral Mucosa every 12 hours  chlorhexidine 2% Cloths 1 Application(s) Topical daily  dexMEDEtomidine Infusion 0.2 MICROgram(s)/kG/Hr (3.5 mL/Hr) IV Continuous <Continuous>  dextrose 5%. 1000 milliLiter(s) (50 mL/Hr) IV Continuous <Continuous>  dextrose 5%. 1000 milliLiter(s) (100 mL/Hr) IV Continuous <Continuous>  dextrose 50% Injectable 25 Gram(s) IV Push once  dextrose 50% Injectable 12.5 Gram(s) IV Push once  dextrose 50% Injectable 25 Gram(s) IV Push once  glucagon  Injectable 1 milliGRAM(s) IntraMuscular once  heparin   Injectable 5000 Unit(s) SubCutaneous every 8 hours  insulin lispro (ADMELOG) corrective regimen sliding scale   SubCutaneous every 6 hours  lacosamide IVPB 250 milliGRAM(s) IV Intermittent every 12 hours  lipid, fat emulsion (Fish Oil and Plant Based) 20% Infusion 0.7 Gm/kG/Day (20.83 mL/Hr) IV Continuous <Continuous>  meropenem  IVPB 1000 milliGRAM(s) IV Intermittent every 8 hours  norepinephrine Infusion 0.05 MICROgram(s)/kG/Min (3.28 mL/Hr) IV Continuous <Continuous>  pantoprazole  Injectable 40 milliGRAM(s) IV Push two times a day  Parenteral Nutrition - Adult 1 Each (70 mL/Hr) TPN Continuous <Continuous>    MEDICATIONS  (PRN):  dextrose Oral Gel 15 Gram(s) Oral once PRN Blood Glucose LESS THAN 70 milliGRAM(s)/deciliter  oxyCODONE    Solution 10 milliGRAM(s) Oral every 4 hours PRN Severe Pain (7 - 10)  oxyCODONE    Solution 5 milliGRAM(s) Enteral Tube every 4 hours PRN Moderate Pain (4 - 6)      Drips:   Levophed 0.8  Precedex 0.9    ICU Vital Signs Last 24 Hrs  T(C): 37.8 (30 May 2023 05:14), Max: 39 (29 May 2023 12:00)  T(F): 100 (30 May 2023 05:14), Max: 102.2 (29 May 2023 12:00)  HR: 101 (30 May 2023 06:00) (86 - 121)  ABP: 111/63 (30 May 2023 06:00) (78/48 - 151/91)  ABP(mean): 83 (30 May 2023 06:00) (58 - 118)  RR: 22 (30 May 2023 06:00) (16 - 34)  SpO2: 100% (30 May 2023 06:00) (91% - 100%)    O2 Parameters below as of 30 May 2023 06:00  Patient On (Oxygen Delivery Method): ventilator, cpap    O2 Concentration (%): 40      Physical Exam:  General: Resting in bed, ill-appearing, follows basic commands   HEENT: NC/AT, EOMI, ETT and OG tube in place, Tortocollis+  Pulmonary: Mechanically ventilated, decreased breath sounds on L and expiratory crackles appreciated on R throughout, on CPAP  Cardiovascular: NSR, no murmurs  Abdominal: soft, mild distention, generalized tenderness to palpation, No rebound/guarding. CODIE x4 noted in L abdomen and CODIE x2 noted in R abdomen - all dark sanginous/bilious   Extremities: WWP, 5/5 strength x 4, no clubbing/cyanosis/edema  Neuro: A/O x3, CNs II-XII grossly intact, normal motor/sensation, no focal deficits  Pulses: palpable distal pulses    Lines/tubes/drains: L TLC, R HD cath, R axillary A-line, PIVs  Cantrell: N/A      Vent settings:  CPAP with PS, FiO2: 40, PEEP: 5, PS: 8, MAP: 7.9, PIP: 13    I&O's Summary    28 May 2023 07:01  -  29 May 2023 07:00  --------------------------------------------------------  IN: 4336.1 mL / OUT: 3861 mL / NET: 475.1 mL    29 May 2023 07:01  -  30 May 2023 06:35  --------------------------------------------------------  IN: 3169.7 mL / OUT: 1970 mL / NET: 1199.7 mL        LABS:                        8.4    15.33 )-----------( 56       ( 30 May 2023 05:04 )             24.7     05-30    131<L>  |  100  |  28<H>  ----------------------------<  140<H>  4.0   |  23  |  0.77    Ca    7.7<L>      30 May 2023 05:04  Phos  4.7     05-30  Mg     2.2     05-30    TPro  5.1<L>  /  Alb  2.0<L>  /  TBili  2.1<H>  /  DBili  1.3<H>  /  AST  19  /  ALT  9<L>  /  AlkPhos  183<H>  05-30    PT/INR - ( 30 May 2023 05:04 )   PT: 16.9 sec;   INR: 1.42          PTT - ( 30 May 2023 05:04 )  PTT:37.6 sec    CAPILLARY BLOOD GLUCOSE      POCT Blood Glucose.: 102 mg/dL (29 May 2023 18:29)  POCT Blood Glucose.: 105 mg/dL (29 May 2023 11:16)    LIVER FUNCTIONS - ( 30 May 2023 05:04 )  Alb: 2.0 g/dL / Pro: 5.1 g/dL / ALK PHOS: 183 U/L / ALT: 9 U/L / AST: 19 U/L / GGT: x             Cultures:Culture Results:   No growth at 12 hours (05-29 @ 11:26)  Culture Results:   No growth at 12 hours (05-29 @ 11:26)      RADIOLOGY & ADDITIONAL STUDIES:     ON: Occult blood +. NPO@MN. T100.8, Tylenol given.    SUBJECTIVE: Patient seen and examined at bedside this AM by ICU team. Patient is lying in bed. Reports he is having significant abdominal pain this AM. Denies nausea or emesis.     MEDICATIONS  (STANDING):  albuterol/ipratropium for Nebulization 3 milliLiter(s) Nebulizer every 6 hours  artificial  tears Solution 1 Drop(s) Both EYES two times a day  aspirin  chewable 81 milliGRAM(s) Oral daily  caspofungin IVPB 50 milliGRAM(s) IV Intermittent every 24 hours  caspofungin IVPB      chlorhexidine 0.12% Liquid 15 milliLiter(s) Oral Mucosa every 12 hours  chlorhexidine 2% Cloths 1 Application(s) Topical daily  dexMEDEtomidine Infusion 0.2 MICROgram(s)/kG/Hr (3.5 mL/Hr) IV Continuous <Continuous>  dextrose 5%. 1000 milliLiter(s) (50 mL/Hr) IV Continuous <Continuous>  dextrose 5%. 1000 milliLiter(s) (100 mL/Hr) IV Continuous <Continuous>  dextrose 50% Injectable 25 Gram(s) IV Push once  dextrose 50% Injectable 12.5 Gram(s) IV Push once  dextrose 50% Injectable 25 Gram(s) IV Push once  glucagon  Injectable 1 milliGRAM(s) IntraMuscular once  heparin   Injectable 5000 Unit(s) SubCutaneous every 8 hours  insulin lispro (ADMELOG) corrective regimen sliding scale   SubCutaneous every 6 hours  lacosamide IVPB 250 milliGRAM(s) IV Intermittent every 12 hours  lipid, fat emulsion (Fish Oil and Plant Based) 20% Infusion 0.7 Gm/kG/Day (20.83 mL/Hr) IV Continuous <Continuous>  meropenem  IVPB 1000 milliGRAM(s) IV Intermittent every 8 hours  norepinephrine Infusion 0.05 MICROgram(s)/kG/Min (3.28 mL/Hr) IV Continuous <Continuous>  pantoprazole  Injectable 40 milliGRAM(s) IV Push two times a day  Parenteral Nutrition - Adult 1 Each (70 mL/Hr) TPN Continuous <Continuous>    MEDICATIONS  (PRN):  dextrose Oral Gel 15 Gram(s) Oral once PRN Blood Glucose LESS THAN 70 milliGRAM(s)/deciliter  oxyCODONE    Solution 10 milliGRAM(s) Oral every 4 hours PRN Severe Pain (7 - 10)  oxyCODONE    Solution 5 milliGRAM(s) Enteral Tube every 4 hours PRN Moderate Pain (4 - 6)      Drips:   Levophed 0.8  Precedex 0.9    ICU Vital Signs Last 24 Hrs  T(C): 37.8 (30 May 2023 05:14), Max: 39 (29 May 2023 12:00)  T(F): 100 (30 May 2023 05:14), Max: 102.2 (29 May 2023 12:00)  HR: 101 (30 May 2023 06:00) (86 - 121)  ABP: 111/63 (30 May 2023 06:00) (78/48 - 151/91)  ABP(mean): 83 (30 May 2023 06:00) (58 - 118)  RR: 22 (30 May 2023 06:00) (16 - 34)  SpO2: 100% (30 May 2023 06:00) (91% - 100%)    O2 Parameters below as of 30 May 2023 06:00  Patient On (Oxygen Delivery Method): ventilator, CPAP  O2 Concentration (%): 40    Physical Exam:  General: Resting in bed, ill-appearing, follows basic commands   HEENT: NC/AT, EOMI, ETT and OG tube in place, Tortocollis+  Pulmonary: Mechanically ventilated, decreased breath sounds on L and expiratory crackles appreciated on R throughout, on CPAP  Cardiovascular: NSR, no murmurs  Abdominal: soft, mild distention, generalized tenderness to palpation, No rebound/guarding. CODIE x4 noted in L abdomen and CODIE x2 noted in R abdomen - all dark sanguinous/bilious   : Condom cath in place draining concentrated urine   Rectal: rectal tube in place with minimal output  Extremities: WWP, 2+ edema noted in B/L LE     Lines/tubes/drains: L TLC, R HD cath, R axillary A-line, PIVs  Cantrell: N/A      Vent settings:  CPAP with PS, FiO2: 40, PEEP: 5, PS: 8, MAP: 7.9, PIP: 13    I&O's Summary    28 May 2023 07:01  -  29 May 2023 07:00  --------------------------------------------------------  IN: 4336.1 mL / OUT: 3861 mL / NET: 475.1 mL    29 May 2023 07:01  -  30 May 2023 06:35  --------------------------------------------------------  IN: 3169.7 mL / OUT: 1970 mL / NET: 1199.7 mL      LABS:                        8.4    15.33 )-----------( 56       ( 30 May 2023 05:04 )             24.7     05-30    131<L>  |  100  |  28<H>  ----------------------------<  140<H>  4.0   |  23  |  0.77    Ca    7.7<L>      30 May 2023 05:04  Phos  4.7     05-30  Mg     2.2     05-30    TPro  5.1<L>  /  Alb  2.0<L>  /  TBili  2.1<H>  /  DBili  1.3<H>  /  AST  19  /  ALT  9<L>  /  AlkPhos  183<H>  05-30    PT/INR - ( 30 May 2023 05:04 )   PT: 16.9 sec;   INR: 1.42          PTT - ( 30 May 2023 05:04 )  PTT:37.6 sec    CAPILLARY BLOOD GLUCOSE      POCT Blood Glucose.: 102 mg/dL (29 May 2023 18:29)  POCT Blood Glucose.: 105 mg/dL (29 May 2023 11:16)    LIVER FUNCTIONS - ( 30 May 2023 05:04 )  Alb: 2.0 g/dL / Pro: 5.1 g/dL / ALK PHOS: 183 U/L / ALT: 9 U/L / AST: 19 U/L / GGT: x             Cultures: Culture Results:   No growth at 12 hours (05-29 @ 11:26)  Culture Results:   No growth at 12 hours (05-29 @ 11:26)      RADIOLOGY & ADDITIONAL STUDIES:

## 2023-05-30 NOTE — PROGRESS NOTE ADULT - ASSESSMENT
52yo Male pt with PMH HTN, type A aortic dissection s/p Dacron grafts, AV resuspension in 2013, CAD s/p CABG x 1 SVG to RCA (5/2013 with Dr. Medina), seizure disorder (last episode on 7/4/22) S/P replacement of transverse aortic arch, second stage thoracic endovascular aortic repair, aorto-axillary bypass, AV replacement (bio 23mm), in APr 2023 and CABG x 1 (SVG-RCA) EF 75% ([place], 3/20) now s/p 2nd state TEVAR on 5/5 with post op course c/b acute pancreatitis with large peripancreatic/perigastric fluid collections on CTA abdomen 5/8 with subsequent abdominal hemorrhage noted 5/12/23 requiring 11 U pRBC but with negative mesenteric angiogram 5/13/23. S/p paracentesis and LUQ collection aspiration (no drain per surgery) on 5/22/23. LUQ collection growing Klebsiella pneumoniae.    CODIE drains as noted  16 Fr teal LUQ peripancreatic abscess "Left Pancreatic CODIE Drain" placed on 5/24:   -160 cc serosanguinous output in 24 hrs. 100 cc in previous 24 hrs.     16 Fr teal LUQ hematoma "Left Abdominal Hematoma CODIE Drain" placed on 5/25 and upsized on 5/26:   -360 cc dark sanguinous output in 24 hrs. 395 cc in previous 24 hrs.    14 Fr white left mid abdomen pigtail ascites drain "Mid Abdominal Ascites CODIE Drain" placed on 5/25:  -40 cc serous output in 24 hrs. 40 cc in previous 24 hrs.      16 Fr teal pelvic abscess/hematoma drain "Left Pelvic Hematoma" (previously "Lower Abdominal Ascites CODIE Drain" placed on 5/25) and upsized on 5/26.  -45 cc serosanguinous output in 24 hrs. 110 cc in previous 24 hrs.    16 Fr teal ascites drain "Right/Ascites drain" placed on 5/26:  -70 cc serosanguinous output in 24 hrs. 75 cc in previous 24 hrs    16 Fr teal pelvic hematoma/abscess drain "Right Pelvic/hematoma drain" placed on 5/26.  -85 cc sanguinous output in 24 hrs. 35 cc in previous 24 hrs.    CT A/P from 5/29 reviewed, noting persistence of multiple collections within the peritoneal cavity and retroperitoneum, with increase in size of LUQ collection with pigtail catheter.  Remaining collections stable/decreased in size    Potential targets for endoscopic drainage are LUQ fluid collections, though LUQ hematoma with copious bright red drainage as noted, possibly suggesting ongoing bleeding and may also explain downtrend in Hgb, particularly as these appear to be flushing appropriately per IR note.  LUQ pancreatic CODIE drain also draining/flushing without difficulty and with adequate drainage.  Interval growth on CT may represent an ongoing/evolving process rather than failure of drainage. Worsening thrombocytopenia also noted which raises the concern for DIC and precludes safe endoscopic intervention.    Of note is occult positive stool, however output from rectal tube is dark, though bilious appearing and without melena.  No melena/hematochezia observed by RN.    Recommendations:  -Endoscopic drainage not recommended at this time given worsening thrombocytopenia and concern for ongoing bleeding into LUQ hematoma  -Consider DIC evaluation  -Monitor for clinical GIB  -Remainder of care per SICU    Discussed with Dr. Parsons

## 2023-05-30 NOTE — PROGRESS NOTE ADULT - SUBJECTIVE AND OBJECTIVE BOX
Pt seen and examined at bedside.  Pt remains intubated/sedated on levophed.  On CPAP  IR Cultures positive for Klebsiella  Blood cx 5/27 with NGTD; BCx redrawn 5/29 for fever also NGTD.  Tmax 100.2/min 100.1      Allergies    No Known Allergies    Intolerances        MEDICATIONS:  MEDICATIONS  (STANDING):  acetylcysteine 20%  Inhalation 4 milliLiter(s) Inhalation every 6 hours  albuterol/ipratropium for Nebulization 3 milliLiter(s) Nebulizer every 6 hours  artificial  tears Solution 1 Drop(s) Both EYES two times a day  aspirin  chewable 81 milliGRAM(s) Oral daily  chlorhexidine 0.12% Liquid 15 milliLiter(s) Oral Mucosa every 12 hours  chlorhexidine 2% Cloths 1 Application(s) Topical daily  CRRT Treatment    <Continuous>  dexMEDEtomidine Infusion 0.2 MICROgram(s)/kG/Hr (3.5 mL/Hr) IV Continuous <Continuous>  dextrose 5%. 1000 milliLiter(s) (50 mL/Hr) IV Continuous <Continuous>  dextrose 5%. 1000 milliLiter(s) (100 mL/Hr) IV Continuous <Continuous>  dextrose 50% Injectable 25 Gram(s) IV Push once  dextrose 50% Injectable 12.5 Gram(s) IV Push once  dextrose 50% Injectable 25 Gram(s) IV Push once  glucagon  Injectable 1 milliGRAM(s) IntraMuscular once  HYDROmorphone  Injectable 1 milliGRAM(s) IV Push every 4 hours  insulin lispro (ADMELOG) corrective regimen sliding scale   SubCutaneous every 6 hours  lacosamide IVPB 250 milliGRAM(s) IV Intermittent every 12 hours  lipid, fat emulsion (Fish Oil and Plant Based) 20% Infusion 0.7143 Gm/kG/Day (20.8 mL/Hr) IV Continuous <Continuous>  meropenem  IVPB 1000 milliGRAM(s) IV Intermittent every 8 hours  norepinephrine Infusion 0.05 MICROgram(s)/kG/Min (3.28 mL/Hr) IV Continuous <Continuous>  pantoprazole  Injectable 40 milliGRAM(s) IV Push two times a day  Parenteral Nutrition - Adult 1 Each (70 mL/Hr) TPN Continuous <Continuous>  Parenteral Nutrition - Adult 1 Each (70 mL/Hr) TPN Continuous <Continuous>  Phoxillum Filtration BK 4 / 2.5 5000 milliLiter(s) (1500 mL/Hr) CRRT <Continuous>  propofol Infusion 10 MICROgram(s)/kG/Min (4.2 mL/Hr) IV Continuous <Continuous>  sodium chloride 3%  Inhalation 4 milliLiter(s) Inhalation every 6 hours    MEDICATIONS  (PRN):  dextrose Oral Gel 15 Gram(s) Oral once PRN Blood Glucose LESS THAN 70 milliGRAM(s)/deciliter  HYDROmorphone  Injectable 1 milliGRAM(s) IV Push every 6 hours PRN Severe Pain (7 - 10)  HYDROmorphone  Injectable 0.5 milliGRAM(s) IV Push every 6 hours PRN Moderate Pain (4 - 6)      Vital Signs Last 24 Hrs  T(C): 37.8 (30 May 2023 09:00), Max: 37.9 (29 May 2023 14:00)  T(F): 100.1 (30 May 2023 09:00), Max: 100.2 (29 May 2023 14:00)  HR: 98 (30 May 2023 12:00) (86 - 128)  BP: --  BP(mean): --  RR: 25 (30 May 2023 12:00) (16 - 30)  SpO2: 100% (30 May 2023 12:00) (93% - 100%)    Parameters below as of 30 May 2023 12:00  Patient On (Oxygen Delivery Method): ventilator    O2 Concentration (%): 40    05-29 @ 07:01  -  05-30 @ 07:00  --------------------------------------------------------  IN: 3169.7 mL / OUT: 3182 mL / NET: -12.3 mL    05-30 @ 07:01  -  05-30 @ 13:14  --------------------------------------------------------  IN: 871.3 mL / OUT: 770 mL / NET: 101.3 mL        PHYSICAL EXAM:    Cardiac: Tachy on tele  Pulm: Mechanical vent  Gastrointestinal: Distended, winces with palpation No rebound or guarding. JPs in place  Skin: Warm and dry. No obvious rash    LABS:  CBC Full  -  ( 30 May 2023 05:04 )  WBC Count : 15.33 K/uL  RBC Count : 2.76 M/uL  Hemoglobin : 8.4 g/dL  Hematocrit : 24.7 %  Platelet Count - Automated : 56 K/uL  Mean Cell Volume : 89.5 fl  Mean Cell Hemoglobin : 30.4 pg  Mean Cell Hemoglobin Concentration : 34.0 gm/dL  Auto Neutrophil # : x  Auto Lymphocyte # : x  Auto Monocyte # : x  Auto Eosinophil # : x  Auto Basophil # : x  Auto Neutrophil % : x  Auto Lymphocyte % : x  Auto Monocyte % : x  Auto Eosinophil % : x  Auto Basophil % : x    05-30    132<L>  |  102  |  30<H>  ----------------------------<  146<H>  3.9   |  24  |  0.75    Ca    7.7<L>      30 May 2023 11:36  Phos  4.7     05-30  Mg     2.2     05-30    TPro  5.1<L>  /  Alb  2.0<L>  /  TBili  2.1<H>  /  DBili  1.3<H>  /  AST  19  /  ALT  9<L>  /  AlkPhos  183<H>  05-30    PT/INR - ( 30 May 2023 05:04 )   PT: 16.9 sec;   INR: 1.42          PTT - ( 30 May 2023 05:04 )  PTT:37.6 sec                  RADIOLOGY & ADDITIONAL STUDIES:  Reviewed

## 2023-05-30 NOTE — PROGRESS NOTE ADULT - ASSESSMENT
A/p: 53yMale w/ PMHx of HTN, type A aortic dissection s/p Dacron grafts, AV resuspension in 2013, CAD s/p CABG x 1 SVG to RCA (5/2013 with Dr. Medina), seizure disorder, recent admission 3/20-4/14 for replacement of transverse aortic arch, second stage TEVAR and aorto-axillary bypass, AV replacement (bio 23mm), and CABG x 1 (SVG-RCA). Pt found to have endo leak and taken to OR for TEVAR revision on 5/5, Post op course c/b Klebsiella bacteremia (5/15), resp failure requiring intubation on 5/18, Mucus plugging s/p Bronch 5/19 and PEA arrest. Post operatively pt also found to have hemorrhagic pancreatitis with venu-pancreatic abscess possibly 2* to Depakote therefore s/p IR aspiration of peripancreatic fluid collection and paracentesis on 5/22, IR venu-pancreatic abscess drainage and placement of drainage catheter on 5/24. Pt then transferred from CTICU to SICU for further mgmt of hemorrhagic pancreatitis on 5/25. s/p IR placement of 2 new drainage catheters and catheter exchange on 5/26.     NEURO: Precedex gtt. Oxycodone 5/10 PRN. Hx seizures: s/p epilepsy consult, Cont vimpat. Depakote weaned off due to concerns for drug-related hemorrhagic pancreatitis.   HEENT: Artificial tears  CV: s/p PEA arrest on 5/24 night, Septic shock: On Levophed for MAP> 65. Echo from 5/19 hyperdynamic LV, SHAJI with LVOTO (gradient 26), normal RV, mild MR, no pulm htn. Cont ASA 81mg.    PULM: intubated 5/13 on AC 40/500/22/8, ARDS now resolved - off NO, s/p multiple Mucus plug/ Lung collapse s/p bronch x3 (most recently on 5/26) most likely from outward obstruction: Cont rotating pt around the clock. Cont Duonebs, & Chest PT q4. CPAP trial 40%/8/8 daily. CT chest 5/29 - with mucus plugging of L mainstem bronchus likely 2/2 extrinisic compression.  GI/FEN: NPO on TPN/Lipids,  Holding TF Nepro for potential IR today, restart post procedure. protonix BID, Hemorrhagic pancreatitis: s/p Multiple Drain placements and paracentisis by IR team.  FOBT+ 5/29. Constipation: resolved, now diarrhea: Cont Rectal tube,  : TREY on CVVHD. Will attempt to remove fluid today for goal 500-1L negative if BP allows. Nephro following. Cantrell d/c'ed 5/26 - Continue bladder scan daily for poss straight cath.   HEME: Acute blood loss anemia on 5/12 requiring 11units of PRBC. IR negative for mesenteric extrav. s/p 3u pPRBC since transfer to SICU. Plt 50 this AM - HIT panel  negative 3/26.  ENDO: mISS  ID: Chloe (5/21-) Caspo (5/23-) ID following. Kleb bacteremia from 5/15 & 5/17, DC: Ampicillin ( 5/21- 5/27) subsequent bld cx negative. PNA w/ bronch cx kleb, enterobacter (zosyn, erta resistant), E faecalis, strep angionosus from 5/19, pancreatic abscess cx pan-sensitive kleb 5/22.   PPX: SCDs, SQH.   LINES: R Ax kalpana(5/12-) RIJ HD cath (5/22--), LIJ TLC (5/23--) Dc:  RIJ TLC (5/22-5/27),   WOUNDS/DRAINS: left venu-pancreatic abscess CODIE drain x 2, ascites CODIE x 2  PT: PT/OR ordered on 5/28  Dispo: SICU

## 2023-05-30 NOTE — PROGRESS NOTE ADULT - SUBJECTIVE AND OBJECTIVE BOX
INTERVAL HPI/OVERNIGHT EVENTS:    Patient was seen and examined at bedside.  on CVVH. + low grade fever    ROS:    unable to obtain         ANTIBIOTICS/RELEVANT:    MEDICATIONS  (STANDING):  acetylcysteine 20%  Inhalation 4 milliLiter(s) Inhalation every 6 hours  albuterol/ipratropium for Nebulization 3 milliLiter(s) Nebulizer every 6 hours  artificial  tears Solution 1 Drop(s) Both EYES two times a day  aspirin  chewable 81 milliGRAM(s) Oral daily  caspofungin IVPB      caspofungin IVPB 50 milliGRAM(s) IV Intermittent every 24 hours  chlorhexidine 0.12% Liquid 15 milliLiter(s) Oral Mucosa every 12 hours  chlorhexidine 2% Cloths 1 Application(s) Topical daily  CRRT Treatment    <Continuous>  dexMEDEtomidine Infusion 0.2 MICROgram(s)/kG/Hr (3.5 mL/Hr) IV Continuous <Continuous>  dextrose 5%. 1000 milliLiter(s) (50 mL/Hr) IV Continuous <Continuous>  dextrose 5%. 1000 milliLiter(s) (100 mL/Hr) IV Continuous <Continuous>  dextrose 50% Injectable 25 Gram(s) IV Push once  dextrose 50% Injectable 12.5 Gram(s) IV Push once  dextrose 50% Injectable 25 Gram(s) IV Push once  glucagon  Injectable 1 milliGRAM(s) IntraMuscular once  HYDROmorphone  Injectable 1 milliGRAM(s) IV Push every 4 hours  insulin lispro (ADMELOG) corrective regimen sliding scale   SubCutaneous every 6 hours  lacosamide IVPB 250 milliGRAM(s) IV Intermittent every 12 hours  lipid, fat emulsion (Fish Oil and Plant Based) 20% Infusion 0.7 Gm/kG/Day (20.83 mL/Hr) IV Continuous <Continuous>  meropenem  IVPB 1000 milliGRAM(s) IV Intermittent every 8 hours  norepinephrine Infusion 0.05 MICROgram(s)/kG/Min (3.28 mL/Hr) IV Continuous <Continuous>  pantoprazole  Injectable 40 milliGRAM(s) IV Push two times a day  Parenteral Nutrition - Adult 1 Each (70 mL/Hr) TPN Continuous <Continuous>  Parenteral Nutrition - Adult 1 Each (70 mL/Hr) TPN Continuous <Continuous>  Phoxillum Filtration BK 4 / 2.5 5000 milliLiter(s) (1500 mL/Hr) CRRT <Continuous>  propofol Infusion 10 MICROgram(s)/kG/Min (4.2 mL/Hr) IV Continuous <Continuous>  sodium chloride 3%  Inhalation 4 milliLiter(s) Inhalation every 6 hours    MEDICATIONS  (PRN):  dextrose Oral Gel 15 Gram(s) Oral once PRN Blood Glucose LESS THAN 70 milliGRAM(s)/deciliter  HYDROmorphone  Injectable 1 milliGRAM(s) IV Push every 6 hours PRN Severe Pain (7 - 10)  HYDROmorphone  Injectable 0.5 milliGRAM(s) IV Push every 6 hours PRN Moderate Pain (4 - 6)        Vital Signs Last 24 Hrs  T(C): 37.8 (30 May 2023 09:00), Max: 37.9 (29 May 2023 14:00)  T(F): 100.1 (30 May 2023 09:00), Max: 100.2 (29 May 2023 14:00)  HR: 89 (30 May 2023 11:00) (86 - 128)  BP: --  BP(mean): --  RR: 18 (30 May 2023 11:00) (16 - 30)  SpO2: 100% (30 May 2023 11:00) (93% - 100%)    Parameters below as of 30 May 2023 11:00  Patient On (Oxygen Delivery Method): ventilator    O2 Concentration (%): 40    PHYSICAL EXAM:  Constitutional: intubated, ill appearing  Eyes:IVAN, EOMI  Ear/Nose/Throat: no oral lesion, no sinus tenderness on percussion	  Neck:  supple  Respiratory: CTA marti  Cardiovascular: S1S2 RRR, no murmurs  Gastrointestinal:soft, (+) BS, no HSM  Extremities:no e/e/c  Vascular: DP Pulse:	right normal; left normal      LABS:                        8.4    15.33 )-----------( 56       ( 30 May 2023 05:04 )             24.7     05-30    131<L>  |  100  |  28<H>  ----------------------------<  140<H>  4.0   |  23  |  0.77    Ca    7.7<L>      30 May 2023 05:04  Phos  4.7     05-30  Mg     2.2     05-30    TPro  5.1<L>  /  Alb  2.0<L>  /  TBili  2.1<H>  /  DBili  1.3<H>  /  AST  19  /  ALT  9<L>  /  AlkPhos  183<H>  05-30    PT/INR - ( 30 May 2023 05:04 )   PT: 16.9 sec;   INR: 1.42          PTT - ( 30 May 2023 05:04 )  PTT:37.6 sec      MICROBIOLOGY:    Culture - Blood (05.29.23 @ 11:26)    Specimen Source: .Blood Blood-Peripheral   Culture Results:   No growth at 12 hours    Culture - Body Fluid with Gram Stain (05.26.23 @ 11:00)    Gram Stain:   No organisms seen  Rare WBC's   Specimen Source: .Body Fluid R-Abdominal Ascites   Culture Results:   No growth to date        RADIOLOGY & ADDITIONAL STUDIES:

## 2023-05-30 NOTE — PROGRESS NOTE ADULT - SUBJECTIVE AND OBJECTIVE BOX
-All dressings c/d/i.    1. 16 Fr teal LUQ peripancreatic abscess "Left Pancreatic CODIE Drain" placed on 5/24:   -160 cc serosanguinous output in 24 hrs. 100 cc in previous 24 hrs.   Flushed easily with 3-5 cc NS without resistance.    2. 16 Fr teal LUQ hematoma "Left Abdominal Hematoma CODIE Drain" placed on 5/25 and upsized on 5/26:   -360 cc dark sanguinous output in 24 hrs. 395 cc in previous 24 hrs.  Flushed easily with 3-5 cc NS without resistance.    3. 14 Fr white left mid abdomen pigtail ascites drain "Mid Abdominal Ascites CODIE Drain" placed on 5/25:  -40 cc serous output in 24 hrs. 40 cc in previous 24 hrs.    Flushed easily with 3-5 cc NS without resistance.    4. 16 Fr teal pelvic abscess/hematoma drain "Left Pelvic Hematoma" (previously "Lower Abdominal Ascites CODIE Drain" placed on 5/25) and upsized on 5/26.  -45 cc serosanguinous output in 24 hrs. 110 cc in previous 24 hrs.  Flushed easily with 3-5 cc NS without resistance.    5. 16 Fr teal ascites drain "Right/Ascites drain" placed on 5/26:  -70 cc serosanguinous output in 24 hrs. 75 cc in previous 24 hrs  Mild resistance to flushing.     6. 16 Fr teal pelvic hematoma/abscess drain "Right Pelvic/hematoma drain" placed on 5/26.  -85 cc sanguinous output in 24 hrs. 35 cc in previous 24 hrs.  Flushed easily with 3-5 cc NS without resistance.

## 2023-05-30 NOTE — PROGRESS NOTE ADULT - ASSESSMENT
54 y/o Male w/ PMHx of HTN, type A aortic dissection (s/p Dacron grafts, AV resuspension in 2013, CAD s/p CABG x 1 SVG to RCA 5/2013 with Dr. Medina), seizure disorder, recent admission 3/20-4/14 for replacement of transverse aortic arch, second stage TEVAR and aorto-axillary bypass, AV replacement (bio 23mm), and CABG x 1 (SVG-RCA) EF 75% ([place], 3/20). Post op c/b severe cardiogenic and vasogenic shock, TREY requiring CVVHD and temporary dialysis, discharged home mid April. Pt then presented to [facility] ED (4/27-4/30) for syncope vs. seizure episode at home. Negative neuro work up for Seizures AED dose adjusted. Presented to [place] on 5/4 for abdominal pain, imaging revealed recent graft endoleak, transferred to [facility] on 5/5 for surgical mgmt. Pt taken to OR for Second stage TEVAR on 5/5, post op course c/b Klebsiella bacteremia (5/15), resp failure requiring intubation on 5/18, Bronch 5/19 with cx growing kleb, enterobacter (zosyn, erta resistant), E faecalis, strep angionosus, hemorrhagic pancreatitis of unclear etiology with venu-pancreatic abscess (per CT A/P from 5/8 and 5/13) s/p IR aspiration of peripancreatic fluid collection (15cc sanguinous pansensitive kleb 5/22) and paracentesis (4L clear yellow fluid, ascites cx negative) on 5/22, IR venu-pancreatic abscess drainage and placement of drainage catheter on 5/24, as well as PEA arrest on. Pt now transferred from CTICU to SICU for further mgmt of hemorrhagic pancreatitis.    Plan:  Problem 1: s/p TEVAR   -pt was transferred to SICU for further management of hemorrhagic pancreatitis  -continue ASA   -continue to monitor BP and HR  -remainder of care of pancreatitis per primary team   -CT surgery will continue to follow peripherally     Problem 2: Respiratory failure   -pt remains intubated  -continue abx per ID  -care per primary team     Problem 3: Hemorrhagic pancreatitis   -s/p IR drainage  -care per primary team     Problem 4: Seizure disorder  -continue Vimpat and appreciate neuro recs  -care per primary team     I have reviewed clinical labs tests and reports, radiology tests and reports, as well as old patient medical records, and discussed with the referring physician.

## 2023-05-30 NOTE — PROGRESS NOTE ADULT - SUBJECTIVE AND OBJECTIVE BOX
Patient is a 53y Male seen and evaluated at bedside on CVVHD patient remains intubated on levophed uop 15cc/24 hours  K 4.0 bicarb 23 phos 4.7 repeat CT scan done yesterday showing possible new non-drainable collection       Meds:    albuterol/ipratropium for Nebulization 3 every 6 hours  artificial  tears Solution 1 two times a day  aspirin  chewable 81 daily  caspofungin IVPB    caspofungin IVPB 50 every 24 hours  chlorhexidine 0.12% Liquid 15 every 12 hours  chlorhexidine 2% Cloths 1 daily  dexMEDEtomidine Infusion 0.2 <Continuous>  dextrose 5%. 1000 <Continuous>  dextrose 5%. 1000 <Continuous>  dextrose 50% Injectable 25 once  dextrose 50% Injectable 25 once  dextrose 50% Injectable 12.5 once  dextrose Oral Gel 15 once PRN  glucagon  Injectable 1 once  heparin   Injectable 5000 every 8 hours  insulin lispro (ADMELOG) corrective regimen sliding scale  every 6 hours  lacosamide IVPB 250 every 12 hours  lipid, fat emulsion (Fish Oil and Plant Based) 20% Infusion 0.7 <Continuous>  meropenem  IVPB 1000 every 8 hours  norepinephrine Infusion 0.05 <Continuous>  oxyCODONE    Solution 10 every 4 hours PRN  oxyCODONE    Solution 5 every 4 hours PRN  pantoprazole  Injectable 40 two times a day  Parenteral Nutrition - Adult 1 <Continuous>      T(C): , Max: 39 (05-29-23 @ 12:00)  T(F): , Max: 102.2 (05-29-23 @ 12:00)  HR: 128 (05-30-23 @ 07:00)  BP: --  BP(mean): --  RR: 26 (05-30-23 @ 07:00)  SpO2: 93% (05-30-23 @ 07:00)  Wt(kg): --    05-29 @ 07:01  -  05-30 @ 07:00  --------------------------------------------------------  IN: 3169.7 mL / OUT: 3182 mL / NET: -12.3 mL          Review of Systems:  unable to participate         PHYSICAL EXAM:  GENERAL: intubated sedated   CHEST/LUNG: mechanical breath sounds BL  HEART: normal S1S2, RRR  ABDOMEN: Soft, Nontender, +BS, No flank tenderness bilateral  EXTREMITIES: No clubbing, cyanosis, or edema   SKIN: no lesions or rashes   ACCESS: R IJ HD cath      LABS:                        8.4    15.33 )-----------( 56       ( 30 May 2023 05:04 )             24.7     05-30    131<L>  |  100  |  28<H>  ----------------------------<  140<H>  4.0   |  23  |  0.77    Ca    7.7<L>      30 May 2023 05:04  Phos  4.7     05-30  Mg     2.2     05-30    TPro  5.1<L>  /  Alb  2.0<L>  /  TBili  2.1<H>  /  DBili  1.3<H>  /  AST  19  /  ALT  9<L>  /  AlkPhos  183<H>  05-30      PT/INR - ( 30 May 2023 05:04 )   PT: 16.9 sec;   INR: 1.42          PTT - ( 30 May 2023 05:04 )  PTT:37.6 sec          RADIOLOGY & ADDITIONAL STUDIES:           Patient is a 53y Male seen and evaluated at bedside on CVVHD patient remains intubated on levophed uop 15cc/24 hours  K 4.0 bicarb 23 phos 4.7 repeat CT scan done yesterday showing possible new non-drainable collection       Meds:    albuterol/ipratropium for Nebulization 3 every 6 hours  artificial  tears Solution 1 two times a day  aspirin  chewable 81 daily  caspofungin IVPB    caspofungin IVPB 50 every 24 hours  chlorhexidine 0.12% Liquid 15 every 12 hours  chlorhexidine 2% Cloths 1 daily  dexMEDEtomidine Infusion 0.2 <Continuous>  dextrose 5%. 1000 <Continuous>  dextrose 5%. 1000 <Continuous>  dextrose 50% Injectable 25 once  dextrose 50% Injectable 25 once  dextrose 50% Injectable 12.5 once  dextrose Oral Gel 15 once PRN  glucagon  Injectable 1 once  heparin   Injectable 5000 every 8 hours  insulin lispro (ADMELOG) corrective regimen sliding scale  every 6 hours  lacosamide IVPB 250 every 12 hours  lipid, fat emulsion (Fish Oil and Plant Based) 20% Infusion 0.7 <Continuous>  meropenem  IVPB 1000 every 8 hours  norepinephrine Infusion 0.05 <Continuous>  oxyCODONE    Solution 10 every 4 hours PRN  oxyCODONE    Solution 5 every 4 hours PRN  pantoprazole  Injectable 40 two times a day  Parenteral Nutrition - Adult 1 <Continuous>      T(C): , Max: 39 (05-29-23 @ 12:00)  T(F): , Max: 102.2 (05-29-23 @ 12:00)  HR: 128 (05-30-23 @ 07:00)  BP: --  BP(mean): --  RR: 26 (05-30-23 @ 07:00)  SpO2: 93% (05-30-23 @ 07:00)  Wt(kg): --    05-29 @ 07:01  -  05-30 @ 07:00  --------------------------------------------------------  IN: 3169.7 mL / OUT: 3182 mL / NET: -12.3 mL          Review of Systems:  unable to participate         PHYSICAL EXAM:  GENERAL: intubated sedated   CHEST/LUNG: mechanical breath sounds BL  HEART: normal S1S2, RRR  ABDOMEN: Soft, Nontender, +BS, No flank tenderness bilateral  EXTREMITIES:+ edema BL LE UE  SKIN: no lesions or rashes   ACCESS: R IJ HD cath      LABS:                        8.4    15.33 )-----------( 56       ( 30 May 2023 05:04 )             24.7     05-30    131<L>  |  100  |  28<H>  ----------------------------<  140<H>  4.0   |  23  |  0.77    Ca    7.7<L>      30 May 2023 05:04  Phos  4.7     05-30  Mg     2.2     05-30    TPro  5.1<L>  /  Alb  2.0<L>  /  TBili  2.1<H>  /  DBili  1.3<H>  /  AST  19  /  ALT  9<L>  /  AlkPhos  183<H>  05-30      PT/INR - ( 30 May 2023 05:04 )   PT: 16.9 sec;   INR: 1.42          PTT - ( 30 May 2023 05:04 )  PTT:37.6 sec          RADIOLOGY & ADDITIONAL STUDIES:        Phosphorus Level, Serum: 4.7 mg/dL (05-30-23 @ 05:04)  Hemoglobin: 8.4 g/dL (05-30-23 @ 05:04)  Phosphorus Level, Serum: 4.6 mg/dL (05-29-23 @ 23:51)  Phosphorus Level, Serum: 4.1 mg/dL (05-29-23 @ 05:30)    Albumin, Serum: 2.0 g/dL (05-30-23 @ 05:04)  Albumin, Serum: 2.2 g/dL (05-29-23 @ 05:30)      CRRT Treatment:   Modality: CVVHD, Filter: NxStage CAR-505, Target Blood Flow: 300 mL/Min  Target Fluid Balance: Titrate to net NEGATIVE fluid balance, 100 mL/Hr (05-30-23 @ 10:15) [Active]  Phoxillum Filtration BK 4 / 2.5: Solution, 5000 milliLiter(s) infuse at 1500 mL/Hr; infuse through CRRT Circuit  Administration Instructions: Continuous Renal Replacement Therapy (CRRT)  Special Instructions: Dialysate (05-30-23 @ 10:15) [Active]     Patient is a 53y Male seen and evaluated at bedside on CVVHD patient remains intubated on levophed uop 15cc/24 hours  K 4.0 bicarb 23 phos 4.7 repeat CT scan done yesterday showing possible new non-drainable collection       Meds:    albuterol/ipratropium for Nebulization 3 every 6 hours  artificial  tears Solution 1 two times a day  aspirin  chewable 81 daily  caspofungin IVPB    caspofungin IVPB 50 every 24 hours  chlorhexidine 0.12% Liquid 15 every 12 hours  chlorhexidine 2% Cloths 1 daily  dexMEDEtomidine Infusion 0.2 <Continuous>  dextrose 5%. 1000 <Continuous>  dextrose 5%. 1000 <Continuous>  dextrose 50% Injectable 25 once  dextrose 50% Injectable 25 once  dextrose 50% Injectable 12.5 once  dextrose Oral Gel 15 once PRN  glucagon  Injectable 1 once  heparin   Injectable 5000 every 8 hours  insulin lispro (ADMELOG) corrective regimen sliding scale  every 6 hours  lacosamide IVPB 250 every 12 hours  lipid, fat emulsion (Fish Oil and Plant Based) 20% Infusion 0.7 <Continuous>  meropenem  IVPB 1000 every 8 hours  norepinephrine Infusion 0.05 <Continuous>  oxyCODONE    Solution 10 every 4 hours PRN  oxyCODONE    Solution 5 every 4 hours PRN  pantoprazole  Injectable 40 two times a day  Parenteral Nutrition - Adult 1 <Continuous>      T(C): , Max: 39 (05-29-23 @ 12:00)  T(F): , Max: 102.2 (05-29-23 @ 12:00)  HR: 128 (05-30-23 @ 07:00)  BP: --  BP(mean): --  RR: 26 (05-30-23 @ 07:00)  SpO2: 93% (05-30-23 @ 07:00)  Wt(kg): --    05-29 @ 07:01  -  05-30 @ 07:00  --------------------------------------------------------  IN: 3169.7 mL / OUT: 3182 mL / NET: -12.3 mL          Review of Systems:  unable to participate         PHYSICAL EXAM:  GENERAL: intubated sedated   CHEST/LUNG: mechanical breath sounds BL  HEART: normal S1S2, RRR  ABDOMEN: Hard, tender to palpation with wincing  EXTREMITIES:+ edema BL LE UE  SKIN: no lesions or rashes   ACCESS: R IJ HD cath      LABS:                        8.4    15.33 )-----------( 56       ( 30 May 2023 05:04 )             24.7     05-30    131<L>  |  100  |  28<H>  ----------------------------<  140<H>  4.0   |  23  |  0.77    Ca    7.7<L>      30 May 2023 05:04  Phos  4.7     05-30  Mg     2.2     05-30    TPro  5.1<L>  /  Alb  2.0<L>  /  TBili  2.1<H>  /  DBili  1.3<H>  /  AST  19  /  ALT  9<L>  /  AlkPhos  183<H>  05-30      PT/INR - ( 30 May 2023 05:04 )   PT: 16.9 sec;   INR: 1.42          PTT - ( 30 May 2023 05:04 )  PTT:37.6 sec          RADIOLOGY & ADDITIONAL STUDIES:        Phosphorus Level, Serum: 4.7 mg/dL (05-30-23 @ 05:04)  Hemoglobin: 8.4 g/dL (05-30-23 @ 05:04)  Phosphorus Level, Serum: 4.6 mg/dL (05-29-23 @ 23:51)  Phosphorus Level, Serum: 4.1 mg/dL (05-29-23 @ 05:30)    Albumin, Serum: 2.0 g/dL (05-30-23 @ 05:04)  Albumin, Serum: 2.2 g/dL (05-29-23 @ 05:30)      CRRT Treatment:   Modality: CVVHD, Filter: NxStage CAR-505, Target Blood Flow: 300 mL/Min  Target Fluid Balance: Titrate to net NEGATIVE fluid balance, 100 mL/Hr (05-30-23 @ 10:15) [Active]  Phoxillum Filtration BK 4 / 2.5: Solution, 5000 milliLiter(s) infuse at 1500 mL/Hr; infuse through CRRT Circuit  Administration Instructions: Continuous Renal Replacement Therapy (CRRT)  Special Instructions: Dialysate (05-30-23 @ 10:15) [Active]

## 2023-05-30 NOTE — PROGRESS NOTE ADULT - ATTENDING COMMENTS
Type A aortic dissection, CAD, seizure disorder, hemorrhagic pancreatitis after second stage aortic graft with septic shock, Klebsiella bacteremia/pna/peritonitis, ATN, AHRF  physical as above  mental status improving  still some evidence of mucus plugging on CXR; to intensify pulm toilet with mucomyst, hypertonic saline, percussion/rotation  continue meropenem/ampicillin; Caspofungin DC'd  further subcostal drain by Dr. Turk  CVVH  NE to keep MAP over 65; doses stable  oxygenation ok off Armaan  RASS to -2 on precedex with dilaudid for pain  TPN written with trickle nepro feeds  decision making of high complexity

## 2023-05-30 NOTE — PROGRESS NOTE ADULT - ASSESSMENT
53 YO Male, HTN aortic dissection previous admission with severe cardiogenic shock and oliguric TREY requiring CVVHD. Presented to the ED with worsening epigastric pain CTA/P revealed continued endoleak hospital course complicated by necrotic pancreatitis      #oligoanuric ATN 2/2 cardiogenic shock  #necrotizing pancreatitis    #Seen on CVVHD    recommend:  c/w CVVHD for UF and clearance   will c/w phoxillum as phos 4.1  Qb 300ml/min will lower DFR to 1.5L.hour and increase UF to net negative 100ml/hour  q8H BMP while on CVVHD  Check phos daily  maintain MAP > 70 for adequate renal perfusion  strict i's and o's  renally dose abx egfr <15  Will recommend avoiding IV Contrast to prevent further renal insult as pt still oliguric, requiring CVVHD support  BID bladder scan    Malika Garcia D.O  PGY 5 nephrology fellow  [phone]

## 2023-05-30 NOTE — PROGRESS NOTE ADULT - SUBJECTIVE AND OBJECTIVE BOX
IDENTIFICATION: This is a 52yo Male pt with PMH HTN, type A aortic dissection s/p Dacron grafts, AV resuspension in 2013, CAD s/p CABG x 1 SVG to RCA (5/2013 with Dr. Medina), seizure disorder (last episode on 7/4/22) S/P replacement of transverse aortic arch, second stage thoracic endovascular aortic repair, aorto-axillary bypass, AV replacement (bio 23mm), in APr 2023 and CABG x 1 (SVG-RCA) EF 75% ([place], 3/20) now s/p 2nd state TEVAR on 5/5 for whom surgery was consulted for finding of acute pancreatitis with large peripancreatic/perigastric fluid collections on CTA abdomen 5/8 then acute hemorrhage starting 5/12/23 requiring 11 U pRBC but with negative mesenteric angiogram 5/13/23 for whom hepatobiliary surgery was reconsulted for possible hemorrhagic pancreatitis, now with 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      EVENTS:   - Remains intubated/sedated  - H&H stable.  - Additional IR victoriano placed 5/26  - Remains intubated/sedated over weekend but tolerating some CPAP  - IR Cultures grossly positive for Klebsiella but cultures since 5/26 sterile; BCx redrawn 5/29 for fever to 39.0   - CT repeated yesterday with possible persistent (non-drained?) collection on left.    SUBJECTIVE:   - Sedated    MEDICATIONS  (STANDING):  albuterol/ipratropium for Nebulization 3 milliLiter(s) Nebulizer every 6 hours  artificial  tears Solution 1 Drop(s) Both EYES two times a day  aspirin  chewable 81 milliGRAM(s) Oral daily  caspofungin IVPB 50 milliGRAM(s) IV Intermittent every 24 hours  caspofungin IVPB      chlorhexidine 0.12% Liquid 15 milliLiter(s) Oral Mucosa every 12 hours  chlorhexidine 2% Cloths 1 Application(s) Topical daily  dexMEDEtomidine Infusion 0.2 MICROgram(s)/kG/Hr (3.5 mL/Hr) IV Continuous <Continuous>  dextrose 5%. 1000 milliLiter(s) (50 mL/Hr) IV Continuous <Continuous>  dextrose 5%. 1000 milliLiter(s) (100 mL/Hr) IV Continuous <Continuous>  dextrose 50% Injectable 25 Gram(s) IV Push once  dextrose 50% Injectable 12.5 Gram(s) IV Push once  dextrose 50% Injectable 25 Gram(s) IV Push once  glucagon  Injectable 1 milliGRAM(s) IntraMuscular once  heparin   Injectable 5000 Unit(s) SubCutaneous every 8 hours  insulin lispro (ADMELOG) corrective regimen sliding scale   SubCutaneous every 6 hours  lacosamide IVPB 250 milliGRAM(s) IV Intermittent every 12 hours  lipid, fat emulsion (Fish Oil and Plant Based) 20% Infusion 0.7 Gm/kG/Day (20.83 mL/Hr) IV Continuous <Continuous>  meropenem  IVPB 1000 milliGRAM(s) IV Intermittent every 8 hours  norepinephrine Infusion 0.05 MICROgram(s)/kG/Min (3.28 mL/Hr) IV Continuous <Continuous>  pantoprazole  Injectable 40 milliGRAM(s) IV Push two times a day  Parenteral Nutrition - Adult 1 Each (70 mL/Hr) TPN Continuous <Continuous>    MEDICATIONS  (PRN):  dextrose Oral Gel 15 Gram(s) Oral once PRN Blood Glucose LESS THAN 70 milliGRAM(s)/deciliter  oxyCODONE    Solution 10 milliGRAM(s) Oral every 4 hours PRN Severe Pain (7 - 10)  oxyCODONE    Solution 5 milliGRAM(s) Enteral Tube every 4 hours PRN Moderate Pain (4 - 6)      Vital Signs Last 24 Hrs  T(C): 37.8 (30 May 2023 05:14), Max: 39 (29 May 2023 12:00)  T(F): 100 (30 May 2023 05:14), Max: 102.2 (29 May 2023 12:00)  HR: 128 (30 May 2023 07:00) (86 - 128)  BP: --  BP(mean): --  RR: 26 (30 May 2023 07:00) (16 - 34)  SpO2: 93% (30 May 2023 07:00) (93% - 100%)    Parameters below as of 30 May 2023 07:00  Patient On (Oxygen Delivery Method): ventilator, cpap    O2 Concentration (%): 40    Neurologic: Sedated  CV: Normal rate, regular rhythm  Pulm: Breathing comfortably on MV with equal chest rise.  Abd: Moderately distended; mild reaction to palpation despite sedation  Right qascites drains: thin reddish fluid  Left ascites drains: thicker brownish-red fluid  Hematoma drain: thin reddish-black fluid  Pancreatic drain: thin reddish-black fluid  : Cantrell with reddish fluid.  Skin: No rashes  Extremities: No edema.  Psychiatric: Sedated    I&O's Detail    29 May 2023 07:01  -  30 May 2023 07:00  --------------------------------------------------------  IN:    Dexmedetomidine: 392.8 mL    Enteral Tube Flush: 210 mL    Fat Emulsion (Fish Oil &amp; Plant Based) 20% Infusion: 291.2 mL    IV PiggyBack: 100 mL    IV PiggyBack: 50 mL    IV PiggyBack: 250 mL    IV PiggyBack: 150 mL    Nepro with Carb Steady: 100 mL    Norepinephrine: 85.7 mL    TPN (Total Parenteral Nutrition): 1540 mL  Total IN: 3169.7 mL    OUT:    Bulb (mL): 85 mL    Bulb (mL): 70 mL    Drain (mL): 160 mL    Drain (mL): 110 mL    Drain (mL): 360 mL    Drain (mL): 40 mL    Fat Emulsion (Fish Oil &amp; Plant Based) 20% Infusion: 0 mL    Intermittent Catheterization - Urethral (mL): 10 mL    Other (mL): 2332 mL    Voided (mL): 15 mL  Total OUT: 3182 mL    Total NET: -12.3 mL          LABS:                        8.4    15.33 )-----------( 56       ( 30 May 2023 05:04 )             24.7     05-30    131<L>  |  100  |  28<H>  ----------------------------<  140<H>  4.0   |  23  |  0.77    Ca    7.7<L>      30 May 2023 05:04  Phos  4.7     05-30  Mg     2.2     05-30    TPro  5.1<L>  /  Alb  2.0<L>  /  TBili  2.1<H>  /  DBili  1.3<H>  /  AST  19  /  ALT  9<L>  /  AlkPhos  183<H>  05-30    PT/INR - ( 30 May 2023 05:04 )   PT: 16.9 sec;   INR: 1.42          PTT - ( 30 May 2023 05:04 )  PTT:37.6 sec      RADIOLOGY & ADDITIONAL STUDIES:

## 2023-05-31 ENCOUNTER — TRANSCRIPTION ENCOUNTER (OUTPATIENT)
Age: 53
End: 2023-05-31

## 2023-05-31 ENCOUNTER — APPOINTMENT (OUTPATIENT)
Dept: CARDIOTHORACIC SURGERY | Facility: CLINIC | Age: 53
End: 2023-05-31

## 2023-05-31 LAB
ALBUMIN SERPL ELPH-MCNC: 1.9 G/DL — LOW (ref 3.3–5)
ALBUMIN SERPL ELPH-MCNC: 1.9 G/DL — LOW (ref 3.3–5)
ALBUMIN SERPL ELPH-MCNC: 2 G/DL — LOW (ref 3.3–5)
ALP SERPL-CCNC: 119 U/L — SIGNIFICANT CHANGE UP (ref 40–120)
ALP SERPL-CCNC: 134 U/L — HIGH (ref 40–120)
ALP SERPL-CCNC: 99 U/L — SIGNIFICANT CHANGE UP (ref 40–120)
ALT FLD-CCNC: 10 U/L — SIGNIFICANT CHANGE UP (ref 10–45)
ALT FLD-CCNC: 23 U/L — SIGNIFICANT CHANGE UP (ref 10–45)
ALT FLD-CCNC: 9 U/L — LOW (ref 10–45)
ANION GAP SERPL CALC-SCNC: 7 MMOL/L — SIGNIFICANT CHANGE UP (ref 5–17)
ANION GAP SERPL CALC-SCNC: 8 MMOL/L — SIGNIFICANT CHANGE UP (ref 5–17)
ANION GAP SERPL CALC-SCNC: 8 MMOL/L — SIGNIFICANT CHANGE UP (ref 5–17)
ANION GAP SERPL CALC-SCNC: 9 MMOL/L — SIGNIFICANT CHANGE UP (ref 5–17)
APTT BLD: 32.8 SEC — SIGNIFICANT CHANGE UP (ref 27.5–35.5)
APTT BLD: 33 SEC — SIGNIFICANT CHANGE UP (ref 27.5–35.5)
APTT BLD: 33.3 SEC — SIGNIFICANT CHANGE UP (ref 27.5–35.5)
APTT BLD: 34.2 SEC — SIGNIFICANT CHANGE UP (ref 27.5–35.5)
APTT BLD: 35.8 SEC — HIGH (ref 27.5–35.5)
AST SERPL-CCNC: 22 U/L — SIGNIFICANT CHANGE UP (ref 10–40)
AST SERPL-CCNC: 26 U/L — SIGNIFICANT CHANGE UP (ref 10–40)
AST SERPL-CCNC: 80 U/L — HIGH (ref 10–40)
BASE EXCESS BLDA CALC-SCNC: -1.4 MMOL/L — SIGNIFICANT CHANGE UP (ref -2–3)
BASE EXCESS BLDA CALC-SCNC: -3.1 MMOL/L — LOW (ref -2–3)
BASE EXCESS BLDA CALC-SCNC: -3.3 MMOL/L — LOW (ref -2–3)
BASE EXCESS BLDA CALC-SCNC: -5.1 MMOL/L — LOW (ref -2–3)
BILIRUB DIRECT SERPL-MCNC: 1.4 MG/DL — HIGH (ref 0–0.3)
BILIRUB DIRECT SERPL-MCNC: 1.8 MG/DL — HIGH (ref 0–0.3)
BILIRUB INDIRECT FLD-MCNC: 0.7 MG/DL — SIGNIFICANT CHANGE UP (ref 0.2–1)
BILIRUB SERPL-MCNC: 2 MG/DL — HIGH (ref 0.2–1.2)
BILIRUB SERPL-MCNC: 2 MG/DL — HIGH (ref 0.2–1.2)
BILIRUB SERPL-MCNC: 2.4 MG/DL — HIGH (ref 0.2–1.2)
BUN SERPL-MCNC: 30 MG/DL — HIGH (ref 7–23)
BUN SERPL-MCNC: 31 MG/DL — HIGH (ref 7–23)
BUN SERPL-MCNC: 35 MG/DL — HIGH (ref 7–23)
BUN SERPL-MCNC: 37 MG/DL — HIGH (ref 7–23)
CA-I BLDA-SCNC: 1.2 MMOL/L — SIGNIFICANT CHANGE UP (ref 1.15–1.33)
CALCIUM SERPL-MCNC: 7.3 MG/DL — LOW (ref 8.4–10.5)
CALCIUM SERPL-MCNC: 7.7 MG/DL — LOW (ref 8.4–10.5)
CALCIUM SERPL-MCNC: 7.8 MG/DL — LOW (ref 8.4–10.5)
CALCIUM SERPL-MCNC: 8 MG/DL — LOW (ref 8.4–10.5)
CHLORIDE SERPL-SCNC: 102 MMOL/L — SIGNIFICANT CHANGE UP (ref 96–108)
CHLORIDE SERPL-SCNC: 102 MMOL/L — SIGNIFICANT CHANGE UP (ref 96–108)
CHLORIDE SERPL-SCNC: 103 MMOL/L — SIGNIFICANT CHANGE UP (ref 96–108)
CHLORIDE SERPL-SCNC: 104 MMOL/L — SIGNIFICANT CHANGE UP (ref 96–108)
CK MB CFR SERPL CALC: 1.8 NG/ML — SIGNIFICANT CHANGE UP (ref 0–6.7)
CK SERPL-CCNC: 19 U/L — LOW (ref 30–200)
CO2 BLDA-SCNC: 22 MMOL/L — SIGNIFICANT CHANGE UP (ref 19–24)
CO2 BLDA-SCNC: 23 MMOL/L — SIGNIFICANT CHANGE UP (ref 19–24)
CO2 BLDA-SCNC: 23 MMOL/L — SIGNIFICANT CHANGE UP (ref 19–24)
CO2 BLDA-SCNC: 24 MMOL/L — SIGNIFICANT CHANGE UP (ref 19–24)
CO2 SERPL-SCNC: 22 MMOL/L — SIGNIFICANT CHANGE UP (ref 22–31)
CO2 SERPL-SCNC: 22 MMOL/L — SIGNIFICANT CHANGE UP (ref 22–31)
CO2 SERPL-SCNC: 23 MMOL/L — SIGNIFICANT CHANGE UP (ref 22–31)
CO2 SERPL-SCNC: 23 MMOL/L — SIGNIFICANT CHANGE UP (ref 22–31)
COHGB MFR BLDA: 1.6 % — SIGNIFICANT CHANGE UP
CREAT SERPL-MCNC: 0.79 MG/DL — SIGNIFICANT CHANGE UP (ref 0.5–1.3)
CREAT SERPL-MCNC: 0.84 MG/DL — SIGNIFICANT CHANGE UP (ref 0.5–1.3)
CREAT SERPL-MCNC: 0.89 MG/DL — SIGNIFICANT CHANGE UP (ref 0.5–1.3)
CREAT SERPL-MCNC: 0.96 MG/DL — SIGNIFICANT CHANGE UP (ref 0.5–1.3)
CULTURE RESULTS: NO GROWTH — SIGNIFICANT CHANGE UP
EGFR: 102 ML/MIN/1.73M2 — SIGNIFICANT CHANGE UP
EGFR: 104 ML/MIN/1.73M2 — SIGNIFICANT CHANGE UP
EGFR: 106 ML/MIN/1.73M2 — SIGNIFICANT CHANGE UP
EGFR: 94 ML/MIN/1.73M2 — SIGNIFICANT CHANGE UP
FIBRINOGEN PPP-MCNC: 256 MG/DL — SIGNIFICANT CHANGE UP (ref 200–445)
FIBRINOGEN PPP-MCNC: 259 MG/DL — SIGNIFICANT CHANGE UP (ref 200–445)
FIBRINOGEN PPP-MCNC: 264 MG/DL — SIGNIFICANT CHANGE UP (ref 200–445)
FIBRINOGEN PPP-MCNC: 277 MG/DL — SIGNIFICANT CHANGE UP (ref 200–445)
FIBRINOGEN PPP-MCNC: 332 MG/DL — SIGNIFICANT CHANGE UP (ref 200–445)
GLUCOSE BLDA-MCNC: 91 MG/DL — SIGNIFICANT CHANGE UP (ref 70–99)
GLUCOSE BLDC GLUCOMTR-MCNC: 144 MG/DL — HIGH (ref 70–99)
GLUCOSE SERPL-MCNC: 109 MG/DL — HIGH (ref 70–99)
GLUCOSE SERPL-MCNC: 132 MG/DL — HIGH (ref 70–99)
GLUCOSE SERPL-MCNC: 135 MG/DL — HIGH (ref 70–99)
GLUCOSE SERPL-MCNC: 138 MG/DL — HIGH (ref 70–99)
HAPTOGLOB SERPL-MCNC: 15 MG/DL — LOW (ref 34–200)
HCO3 BLDA-SCNC: 21 MMOL/L — SIGNIFICANT CHANGE UP (ref 21–28)
HCO3 BLDA-SCNC: 22 MMOL/L — SIGNIFICANT CHANGE UP (ref 21–28)
HCO3 BLDA-SCNC: 22 MMOL/L — SIGNIFICANT CHANGE UP (ref 21–28)
HCO3 BLDA-SCNC: 23 MMOL/L — SIGNIFICANT CHANGE UP (ref 21–28)
HCT VFR BLD CALC: 18.6 % — CRITICAL LOW (ref 39–50)
HCT VFR BLD CALC: 21.3 % — LOW (ref 39–50)
HCT VFR BLD CALC: 21.7 % — LOW (ref 39–50)
HCT VFR BLD CALC: 22.6 % — LOW (ref 39–50)
HCT VFR BLD CALC: 23.3 % — LOW (ref 39–50)
HCT VFR BLD CALC: 23.5 % — LOW (ref 39–50)
HGB BLD-MCNC: 6.2 G/DL — CRITICAL LOW (ref 13–17)
HGB BLD-MCNC: 7.2 G/DL — LOW (ref 13–17)
HGB BLD-MCNC: 7.4 G/DL — LOW (ref 13–17)
HGB BLD-MCNC: 7.8 G/DL — LOW (ref 13–17)
HGB BLD-MCNC: 8 G/DL — LOW (ref 13–17)
HGB BLD-MCNC: 8.1 G/DL — LOW (ref 13–17)
HGB BLDA-MCNC: 7.6 G/DL — LOW (ref 12.6–17.4)
HOROWITZ INDEX BLDA+IHG-RTO: 40 — SIGNIFICANT CHANGE UP
INR BLD: 1.28 — HIGH (ref 0.88–1.16)
INR BLD: 1.28 — HIGH (ref 0.88–1.16)
INR BLD: 1.3 — HIGH (ref 0.88–1.16)
INR BLD: 1.34 — HIGH (ref 0.88–1.16)
INR BLD: 1.36 — HIGH (ref 0.88–1.16)
LACTATE SERPL-SCNC: 1.3 MMOL/L — SIGNIFICANT CHANGE UP (ref 0.5–2)
LACTATE SERPL-SCNC: 1.4 MMOL/L — SIGNIFICANT CHANGE UP (ref 0.5–2)
LACTATE SERPL-SCNC: 1.5 MMOL/L — SIGNIFICANT CHANGE UP (ref 0.5–2)
LDH SERPL L TO P-CCNC: 395 U/L — HIGH (ref 50–242)
MAGNESIUM SERPL-MCNC: 2.1 MG/DL — SIGNIFICANT CHANGE UP (ref 1.6–2.6)
MAGNESIUM SERPL-MCNC: 2.3 MG/DL — SIGNIFICANT CHANGE UP (ref 1.6–2.6)
MAGNESIUM SERPL-MCNC: 2.3 MG/DL — SIGNIFICANT CHANGE UP (ref 1.6–2.6)
MCHC RBC-ENTMCNC: 29 PG — SIGNIFICANT CHANGE UP (ref 27–34)
MCHC RBC-ENTMCNC: 29.7 PG — SIGNIFICANT CHANGE UP (ref 27–34)
MCHC RBC-ENTMCNC: 29.8 PG — SIGNIFICANT CHANGE UP (ref 27–34)
MCHC RBC-ENTMCNC: 30 PG — SIGNIFICANT CHANGE UP (ref 27–34)
MCHC RBC-ENTMCNC: 30.1 PG — SIGNIFICANT CHANGE UP (ref 27–34)
MCHC RBC-ENTMCNC: 30.4 PG — SIGNIFICANT CHANGE UP (ref 27–34)
MCHC RBC-ENTMCNC: 33.3 GM/DL — SIGNIFICANT CHANGE UP (ref 32–36)
MCHC RBC-ENTMCNC: 33.8 GM/DL — SIGNIFICANT CHANGE UP (ref 32–36)
MCHC RBC-ENTMCNC: 34 GM/DL — SIGNIFICANT CHANGE UP (ref 32–36)
MCHC RBC-ENTMCNC: 34.1 GM/DL — SIGNIFICANT CHANGE UP (ref 32–36)
MCHC RBC-ENTMCNC: 34.5 GM/DL — SIGNIFICANT CHANGE UP (ref 32–36)
MCHC RBC-ENTMCNC: 34.8 GM/DL — SIGNIFICANT CHANGE UP (ref 32–36)
MCV RBC AUTO: 85.1 FL — SIGNIFICANT CHANGE UP (ref 80–100)
MCV RBC AUTO: 85.3 FL — SIGNIFICANT CHANGE UP (ref 80–100)
MCV RBC AUTO: 86.9 FL — SIGNIFICANT CHANGE UP (ref 80–100)
MCV RBC AUTO: 87.5 FL — SIGNIFICANT CHANGE UP (ref 80–100)
MCV RBC AUTO: 89.9 FL — SIGNIFICANT CHANGE UP (ref 80–100)
MCV RBC AUTO: 90.3 FL — SIGNIFICANT CHANGE UP (ref 80–100)
METHGB MFR BLDA: 1.2 % — SIGNIFICANT CHANGE UP
NRBC # BLD: 0 /100 WBCS — SIGNIFICANT CHANGE UP (ref 0–0)
OXYHGB MFR BLDA: 95.9 % — HIGH (ref 90–95)
PCO2 BLDA: 36 MMHG — SIGNIFICANT CHANGE UP (ref 35–48)
PCO2 BLDA: 39 MMHG — SIGNIFICANT CHANGE UP (ref 35–48)
PCO2 BLDA: 40 MMHG — SIGNIFICANT CHANGE UP (ref 35–48)
PCO2 BLDA: 40 MMHG — SIGNIFICANT CHANGE UP (ref 35–48)
PH BLDA: 7.32 — LOW (ref 7.35–7.45)
PH BLDA: 7.35 — SIGNIFICANT CHANGE UP (ref 7.35–7.45)
PH BLDA: 7.36 — SIGNIFICANT CHANGE UP (ref 7.35–7.45)
PH BLDA: 7.41 — SIGNIFICANT CHANGE UP (ref 7.35–7.45)
PHOSPHATE SERPL-MCNC: 5.3 MG/DL — HIGH (ref 2.5–4.5)
PHOSPHATE SERPL-MCNC: 5.4 MG/DL — HIGH (ref 2.5–4.5)
PHOSPHATE SERPL-MCNC: 5.6 MG/DL — HIGH (ref 2.5–4.5)
PLATELET # BLD AUTO: 26 K/UL — LOW (ref 150–400)
PLATELET # BLD AUTO: 35 K/UL — LOW (ref 150–400)
PLATELET # BLD AUTO: 37 K/UL — LOW (ref 150–400)
PLATELET # BLD AUTO: 69 K/UL — LOW (ref 150–400)
PLATELET # BLD AUTO: 88 K/UL — LOW (ref 150–400)
PLATELET # BLD AUTO: 92 K/UL — LOW (ref 150–400)
PO2 BLDA: 129 MMHG — HIGH (ref 83–108)
PO2 BLDA: 132 MMHG — HIGH (ref 83–108)
PO2 BLDA: 136 MMHG — HIGH (ref 83–108)
PO2 BLDA: 154 MMHG — HIGH (ref 83–108)
POTASSIUM BLDA-SCNC: 4.8 MMOL/L — SIGNIFICANT CHANGE UP (ref 3.5–5.1)
POTASSIUM SERPL-MCNC: 4.1 MMOL/L — SIGNIFICANT CHANGE UP (ref 3.5–5.3)
POTASSIUM SERPL-MCNC: 4.3 MMOL/L — SIGNIFICANT CHANGE UP (ref 3.5–5.3)
POTASSIUM SERPL-MCNC: 4.5 MMOL/L — SIGNIFICANT CHANGE UP (ref 3.5–5.3)
POTASSIUM SERPL-MCNC: 4.6 MMOL/L — SIGNIFICANT CHANGE UP (ref 3.5–5.3)
POTASSIUM SERPL-SCNC: 4.1 MMOL/L — SIGNIFICANT CHANGE UP (ref 3.5–5.3)
POTASSIUM SERPL-SCNC: 4.3 MMOL/L — SIGNIFICANT CHANGE UP (ref 3.5–5.3)
POTASSIUM SERPL-SCNC: 4.5 MMOL/L — SIGNIFICANT CHANGE UP (ref 3.5–5.3)
POTASSIUM SERPL-SCNC: 4.6 MMOL/L — SIGNIFICANT CHANGE UP (ref 3.5–5.3)
PROT SERPL-MCNC: 4.5 G/DL — LOW (ref 6–8.3)
PROT SERPL-MCNC: 4.7 G/DL — LOW (ref 6–8.3)
PROT SERPL-MCNC: 5.1 G/DL — LOW (ref 6–8.3)
PROTHROM AB SERPL-ACNC: 15.3 SEC — HIGH (ref 10.5–13.4)
PROTHROM AB SERPL-ACNC: 15.3 SEC — HIGH (ref 10.5–13.4)
PROTHROM AB SERPL-ACNC: 15.5 SEC — HIGH (ref 10.5–13.4)
PROTHROM AB SERPL-ACNC: 16 SEC — HIGH (ref 10.5–13.4)
PROTHROM AB SERPL-ACNC: 16.2 SEC — HIGH (ref 10.5–13.4)
RBC # BLD: 2.06 M/UL — LOW (ref 4.2–5.8)
RBC # BLD: 2.37 M/UL — LOW (ref 4.2–5.8)
RBC # BLD: 2.48 M/UL — LOW (ref 4.2–5.8)
RBC # BLD: 2.6 M/UL — LOW (ref 4.2–5.8)
RBC # BLD: 2.73 M/UL — LOW (ref 4.2–5.8)
RBC # BLD: 2.76 M/UL — LOW (ref 4.2–5.8)
RBC # FLD: 15 % — HIGH (ref 10.3–14.5)
RBC # FLD: 15.1 % — HIGH (ref 10.3–14.5)
RBC # FLD: 15.6 % — HIGH (ref 10.3–14.5)
RBC # FLD: 15.8 % — HIGH (ref 10.3–14.5)
RBC # FLD: 16 % — HIGH (ref 10.3–14.5)
RBC # FLD: 16.4 % — HIGH (ref 10.3–14.5)
SAO2 % BLDA: 100 % — HIGH (ref 94–98)
SAO2 % BLDA: 98.7 % — HIGH (ref 94–98)
SAO2 % BLDA: 99.2 % — HIGH (ref 94–98)
SAO2 % BLDA: 99.6 % — HIGH (ref 94–98)
SODIUM BLDA-SCNC: 131 MMOL/L — LOW (ref 136–145)
SODIUM SERPL-SCNC: 132 MMOL/L — LOW (ref 135–145)
SODIUM SERPL-SCNC: 132 MMOL/L — LOW (ref 135–145)
SODIUM SERPL-SCNC: 134 MMOL/L — LOW (ref 135–145)
SODIUM SERPL-SCNC: 135 MMOL/L — SIGNIFICANT CHANGE UP (ref 135–145)
SPECIMEN SOURCE: SIGNIFICANT CHANGE UP
TROPONIN T SERPL-MCNC: 0.15 NG/ML — CRITICAL HIGH (ref 0–0.01)
WBC # BLD: 10.85 K/UL — HIGH (ref 3.8–10.5)
WBC # BLD: 13.11 K/UL — HIGH (ref 3.8–10.5)
WBC # BLD: 14.24 K/UL — HIGH (ref 3.8–10.5)
WBC # BLD: 14.28 K/UL — HIGH (ref 3.8–10.5)
WBC # BLD: 8.35 K/UL — SIGNIFICANT CHANGE UP (ref 3.8–10.5)
WBC # BLD: 9.71 K/UL — SIGNIFICANT CHANGE UP (ref 3.8–10.5)
WBC # FLD AUTO: 10.85 K/UL — HIGH (ref 3.8–10.5)
WBC # FLD AUTO: 13.11 K/UL — HIGH (ref 3.8–10.5)
WBC # FLD AUTO: 14.24 K/UL — HIGH (ref 3.8–10.5)
WBC # FLD AUTO: 14.28 K/UL — HIGH (ref 3.8–10.5)
WBC # FLD AUTO: 8.35 K/UL — SIGNIFICANT CHANGE UP (ref 3.8–10.5)
WBC # FLD AUTO: 9.71 K/UL — SIGNIFICANT CHANGE UP (ref 3.8–10.5)

## 2023-05-31 PROCEDURE — 49020 DRAINAGE ABDOM ABSCESS OPEN: CPT

## 2023-05-31 PROCEDURE — 71045 X-RAY EXAM CHEST 1 VIEW: CPT | Mod: 26,77

## 2023-05-31 PROCEDURE — 99232 SBSQ HOSP IP/OBS MODERATE 35: CPT

## 2023-05-31 PROCEDURE — 93010 ELECTROCARDIOGRAM REPORT: CPT

## 2023-05-31 PROCEDURE — 71045 X-RAY EXAM CHEST 1 VIEW: CPT | Mod: 26

## 2023-05-31 PROCEDURE — 99291 CRITICAL CARE FIRST HOUR: CPT

## 2023-05-31 PROCEDURE — 49040 DRAIN OPEN ABDOM ABSCESS: CPT

## 2023-05-31 DEVICE — ARISTA 3GR: Type: IMPLANTABLE DEVICE | Status: FUNCTIONAL

## 2023-05-31 DEVICE — SURGICEL FIBRILLAR 4 X 4": Type: IMPLANTABLE DEVICE | Status: FUNCTIONAL

## 2023-05-31 DEVICE — VISTASEAL FIBRIN HUMAN 10ML: Type: IMPLANTABLE DEVICE | Status: FUNCTIONAL

## 2023-05-31 RX ORDER — ELECTROLYTE SOLUTION,INJ
1 VIAL (ML) INTRAVENOUS
Refills: 0 | Status: DISCONTINUED | OUTPATIENT
Start: 2023-05-31 | End: 2023-05-31

## 2023-05-31 RX ORDER — HYDROMORPHONE HYDROCHLORIDE 2 MG/ML
1 INJECTION INTRAMUSCULAR; INTRAVENOUS; SUBCUTANEOUS ONCE
Refills: 0 | Status: DISCONTINUED | OUTPATIENT
Start: 2023-05-31 | End: 2023-05-31

## 2023-05-31 RX ORDER — FENTANYL CITRATE 50 UG/ML
50 INJECTION INTRAVENOUS ONCE
Refills: 0 | Status: DISCONTINUED | OUTPATIENT
Start: 2023-05-31 | End: 2023-05-31

## 2023-05-31 RX ORDER — TRANEXAMIC ACID 100 MG/ML
1000 INJECTION, SOLUTION INTRAVENOUS EVERY 12 HOURS
Refills: 0 | Status: DISCONTINUED | OUTPATIENT
Start: 2023-05-31 | End: 2023-06-01

## 2023-05-31 RX ORDER — SODIUM CHLORIDE 9 MG/ML
4 INJECTION INTRAMUSCULAR; INTRAVENOUS; SUBCUTANEOUS EVERY 6 HOURS
Refills: 0 | Status: DISCONTINUED | OUTPATIENT
Start: 2023-05-31 | End: 2023-07-13

## 2023-05-31 RX ORDER — DEXTROSE 50 % IN WATER 50 %
15 SYRINGE (ML) INTRAVENOUS ONCE
Refills: 0 | Status: DISCONTINUED | OUTPATIENT
Start: 2023-05-31 | End: 2023-07-13

## 2023-05-31 RX ORDER — FENTANYL CITRATE 50 UG/ML
0.5 INJECTION INTRAVENOUS
Qty: 5000 | Refills: 0 | Status: DISCONTINUED | OUTPATIENT
Start: 2023-05-31 | End: 2023-06-02

## 2023-05-31 RX ORDER — ACETYLCYSTEINE 200 MG/ML
4 VIAL (ML) MISCELLANEOUS EVERY 6 HOURS
Refills: 0 | Status: DISCONTINUED | OUTPATIENT
Start: 2023-05-31 | End: 2023-06-12

## 2023-05-31 RX ORDER — PANTOPRAZOLE SODIUM 20 MG/1
40 TABLET, DELAYED RELEASE ORAL DAILY
Refills: 0 | Status: DISCONTINUED | OUTPATIENT
Start: 2023-05-31 | End: 2023-07-13

## 2023-05-31 RX ORDER — ACETAMINOPHEN 500 MG
1000 TABLET ORAL ONCE
Refills: 0 | Status: COMPLETED | OUTPATIENT
Start: 2023-05-31 | End: 2023-05-31

## 2023-05-31 RX ORDER — VASOPRESSIN 20 [USP'U]/ML
0.04 INJECTION INTRAVENOUS
Qty: 40 | Refills: 0 | Status: DISCONTINUED | OUTPATIENT
Start: 2023-05-31 | End: 2023-06-01

## 2023-05-31 RX ORDER — PROPOFOL 10 MG/ML
10 INJECTION, EMULSION INTRAVENOUS
Qty: 1000 | Refills: 0 | Status: DISCONTINUED | OUTPATIENT
Start: 2023-05-31 | End: 2023-06-02

## 2023-05-31 RX ORDER — GLUCAGON INJECTION, SOLUTION 0.5 MG/.1ML
1 INJECTION, SOLUTION SUBCUTANEOUS ONCE
Refills: 0 | Status: DISCONTINUED | OUTPATIENT
Start: 2023-05-31 | End: 2023-07-13

## 2023-05-31 RX ORDER — LACOSAMIDE 50 MG/1
250 TABLET ORAL EVERY 12 HOURS
Refills: 0 | Status: DISCONTINUED | OUTPATIENT
Start: 2023-05-31 | End: 2023-06-16

## 2023-05-31 RX ORDER — FENTANYL CITRATE 50 UG/ML
0.5 INJECTION INTRAVENOUS
Qty: 2500 | Refills: 0 | Status: DISCONTINUED | OUTPATIENT
Start: 2023-05-31 | End: 2023-05-31

## 2023-05-31 RX ORDER — FENTANYL CITRATE 50 UG/ML
0.5 INJECTION INTRAVENOUS
Qty: 5000 | Refills: 0 | Status: DISCONTINUED | OUTPATIENT
Start: 2023-05-31 | End: 2023-05-31

## 2023-05-31 RX ORDER — CHLORHEXIDINE GLUCONATE 213 G/1000ML
15 SOLUTION TOPICAL EVERY 12 HOURS
Refills: 0 | Status: DISCONTINUED | OUTPATIENT
Start: 2023-05-31 | End: 2023-06-30

## 2023-05-31 RX ORDER — CHLORHEXIDINE GLUCONATE 213 G/1000ML
1 SOLUTION TOPICAL DAILY
Refills: 0 | Status: DISCONTINUED | OUTPATIENT
Start: 2023-05-31 | End: 2023-06-23

## 2023-05-31 RX ORDER — ACETAMINOPHEN 500 MG
1000 TABLET ORAL ONCE
Refills: 0 | Status: DISCONTINUED | OUTPATIENT
Start: 2023-05-31 | End: 2023-05-31

## 2023-05-31 RX ORDER — NOREPINEPHRINE BITARTRATE/D5W 8 MG/250ML
0.03 PLASTIC BAG, INJECTION (ML) INTRAVENOUS
Qty: 8 | Refills: 0 | Status: DISCONTINUED | OUTPATIENT
Start: 2023-05-31 | End: 2023-05-31

## 2023-05-31 RX ORDER — MEROPENEM 1 G/30ML
1000 INJECTION INTRAVENOUS EVERY 8 HOURS
Refills: 0 | Status: DISCONTINUED | OUTPATIENT
Start: 2023-05-31 | End: 2023-06-05

## 2023-05-31 RX ORDER — NOREPINEPHRINE BITARTRATE/D5W 8 MG/250ML
0.05 PLASTIC BAG, INJECTION (ML) INTRAVENOUS
Qty: 16 | Refills: 0 | Status: DISCONTINUED | OUTPATIENT
Start: 2023-05-31 | End: 2023-06-01

## 2023-05-31 RX ORDER — DEXTROSE 50 % IN WATER 50 %
25 SYRINGE (ML) INTRAVENOUS ONCE
Refills: 0 | Status: DISCONTINUED | OUTPATIENT
Start: 2023-05-31 | End: 2023-07-13

## 2023-05-31 RX ORDER — CISATRACURIUM BESYLATE 2 MG/ML
3 INJECTION INTRAVENOUS
Qty: 200 | Refills: 0 | Status: DISCONTINUED | OUTPATIENT
Start: 2023-05-31 | End: 2023-06-01

## 2023-05-31 RX ADMIN — CHLORHEXIDINE GLUCONATE 1 APPLICATION(S): 213 SOLUTION TOPICAL at 14:25

## 2023-05-31 RX ADMIN — HYDROMORPHONE HYDROCHLORIDE 1 MILLIGRAM(S): 2 INJECTION INTRAMUSCULAR; INTRAVENOUS; SUBCUTANEOUS at 05:45

## 2023-05-31 RX ADMIN — HYDROMORPHONE HYDROCHLORIDE 1 MILLIGRAM(S): 2 INJECTION INTRAMUSCULAR; INTRAVENOUS; SUBCUTANEOUS at 07:14

## 2023-05-31 RX ADMIN — Medication 4 MILLILITER(S): at 17:17

## 2023-05-31 RX ADMIN — PROPOFOL 4.2 MICROGRAM(S)/KG/MIN: 10 INJECTION, EMULSION INTRAVENOUS at 13:10

## 2023-05-31 RX ADMIN — Medication 1000 MILLIGRAM(S): at 07:30

## 2023-05-31 RX ADMIN — Medication 1 EACH: at 17:17

## 2023-05-31 RX ADMIN — HYDROMORPHONE HYDROCHLORIDE 1 MILLIGRAM(S): 2 INJECTION INTRAMUSCULAR; INTRAVENOUS; SUBCUTANEOUS at 01:18

## 2023-05-31 RX ADMIN — CISATRACURIUM BESYLATE 12.6 MICROGRAM(S)/KG/MIN: 2 INJECTION INTRAVENOUS at 19:55

## 2023-05-31 RX ADMIN — VASOPRESSIN 6 UNIT(S)/MIN: 20 INJECTION INTRAVENOUS at 13:10

## 2023-05-31 RX ADMIN — FENTANYL CITRATE 50 MICROGRAM(S): 50 INJECTION INTRAVENOUS at 12:25

## 2023-05-31 RX ADMIN — FENTANYL CITRATE 50 MICROGRAM(S): 50 INJECTION INTRAVENOUS at 12:14

## 2023-05-31 RX ADMIN — HYDROMORPHONE HYDROCHLORIDE 1 MILLIGRAM(S): 2 INJECTION INTRAMUSCULAR; INTRAVENOUS; SUBCUTANEOUS at 07:30

## 2023-05-31 RX ADMIN — Medication 4 MILLILITER(S): at 05:59

## 2023-05-31 RX ADMIN — FENTANYL CITRATE 1.75 MICROGRAM(S)/KG/HR: 50 INJECTION INTRAVENOUS at 13:50

## 2023-05-31 RX ADMIN — VASOPRESSIN 6 UNIT(S)/MIN: 20 INJECTION INTRAVENOUS at 21:27

## 2023-05-31 RX ADMIN — PANTOPRAZOLE SODIUM 40 MILLIGRAM(S): 20 TABLET, DELAYED RELEASE ORAL at 05:30

## 2023-05-31 RX ADMIN — MEROPENEM 100 MILLIGRAM(S): 1 INJECTION INTRAVENOUS at 22:24

## 2023-05-31 RX ADMIN — Medication 1 DROP(S): at 05:30

## 2023-05-31 RX ADMIN — LACOSAMIDE 150 MILLIGRAM(S): 50 TABLET ORAL at 13:42

## 2023-05-31 RX ADMIN — Medication 400 MILLIGRAM(S): at 02:04

## 2023-05-31 RX ADMIN — HYDROMORPHONE HYDROCHLORIDE 1 MILLIGRAM(S): 2 INJECTION INTRAMUSCULAR; INTRAVENOUS; SUBCUTANEOUS at 05:30

## 2023-05-31 RX ADMIN — Medication 1000 MILLIGRAM(S): at 02:34

## 2023-05-31 RX ADMIN — Medication 400 MILLIGRAM(S): at 07:15

## 2023-05-31 RX ADMIN — CHLORHEXIDINE GLUCONATE 1 APPLICATION(S): 213 SOLUTION TOPICAL at 05:31

## 2023-05-31 RX ADMIN — SODIUM CHLORIDE 4 MILLILITER(S): 9 INJECTION INTRAMUSCULAR; INTRAVENOUS; SUBCUTANEOUS at 17:17

## 2023-05-31 RX ADMIN — Medication 1 DROP(S): at 17:46

## 2023-05-31 RX ADMIN — TRANEXAMIC ACID 220 MILLIGRAM(S): 100 INJECTION, SOLUTION INTRAVENOUS at 21:14

## 2023-05-31 RX ADMIN — SODIUM CHLORIDE 4 MILLILITER(S): 9 INJECTION INTRAMUSCULAR; INTRAVENOUS; SUBCUTANEOUS at 05:59

## 2023-05-31 RX ADMIN — Medication 400 MILLIGRAM(S): at 17:36

## 2023-05-31 RX ADMIN — Medication 1000 MILLIGRAM(S): at 18:00

## 2023-05-31 RX ADMIN — FENTANYL CITRATE 50 MICROGRAM(S): 50 INJECTION INTRAVENOUS at 12:35

## 2023-05-31 RX ADMIN — Medication 1 APPLICATION(S): at 21:14

## 2023-05-31 RX ADMIN — PANTOPRAZOLE SODIUM 40 MILLIGRAM(S): 20 TABLET, DELAYED RELEASE ORAL at 18:32

## 2023-05-31 RX ADMIN — CHLORHEXIDINE GLUCONATE 15 MILLILITER(S): 213 SOLUTION TOPICAL at 17:35

## 2023-05-31 RX ADMIN — PROPOFOL 4.2 MICROGRAM(S)/KG/MIN: 10 INJECTION, EMULSION INTRAVENOUS at 17:58

## 2023-05-31 RX ADMIN — MEROPENEM 100 MILLIGRAM(S): 1 INJECTION INTRAVENOUS at 05:29

## 2023-05-31 RX ADMIN — HYDROMORPHONE HYDROCHLORIDE 1 MILLIGRAM(S): 2 INJECTION INTRAMUSCULAR; INTRAVENOUS; SUBCUTANEOUS at 01:03

## 2023-05-31 RX ADMIN — CHLORHEXIDINE GLUCONATE 15 MILLILITER(S): 213 SOLUTION TOPICAL at 05:29

## 2023-05-31 RX ADMIN — Medication 3 MILLILITER(S): at 05:59

## 2023-05-31 RX ADMIN — MEROPENEM 100 MILLIGRAM(S): 1 INJECTION INTRAVENOUS at 16:15

## 2023-05-31 NOTE — PROGRESS NOTE ADULT - ASSESSMENT
54 y/o Male w/ PMHx of HTN, type A aortic dissection (s/p Dacron grafts, AV resuspension in 2013, CAD s/p CABG x 1 SVG to RCA 5/2013 with Dr. Medina), seizure disorder, recent admission 3/20-4/14 for replacement of transverse aortic arch, second stage TEVAR and aorto-axillary bypass, AV replacement (bio 23mm), and CABG x 1 (SVG-RCA) EF 75% ([place], 3/20). Post op c/b severe cardiogenic and vasogenic shock, TREY requiring CVVHD and temporary dialysis, discharged home mid April. Pt then presented to [facility] ED (4/27-4/30) for syncope vs. seizure episode at home. Negative neuro work up for Seizures AED dose adjusted. Presented to [place] on 5/4 for abdominal pain, imaging revealed recent graft endoleak, transferred to [facility] on 5/5 for surgical mgmt. Pt taken to OR for Second stage TEVAR on 5/5, post op course c/b Klebsiella bacteremia (5/15), resp failure requiring intubation on 5/18, Bronch 5/19 with cx growing kleb, enterobacter (zosyn, erta resistant), E faecalis, strep angionosus, hemorrhagic pancreatitis of unclear etiology with venu-pancreatic abscess (per CT A/P from 5/8 and 5/13) s/p IR aspiration of peripancreatic fluid collection (15cc sanguinous pansensitive kleb 5/22) and paracentesis (4L clear yellow fluid, ascites cx negative) on 5/22, IR venu-pancreatic abscess drainage and placement of drainage catheter on 5/24, as well as PEA arrest on. Pt now transferred from CTICU to SICU for further mgmt of hemorrhagic pancreatitis.    Plan:  Problem 1: s/p TEVAR   -pt was transferred to SICU for further management of hemorrhagic pancreatitis  -continue ASA   -continue to monitor BP and HR  -remainder of care of pancreatitis per primary team   -CT surgery will continue to follow peripherally     Problem 2: Respiratory failure   -pt remains intubated  -continue abx per ID  -care per primary team     Problem 3: Hemorrhagic pancreatitis   -s/p IR drainage   -concerns for bleeding over night into early morning due to low h/h without change from transfusions and increased drainage from one of the abdominal drains. Brought to the OR for exploratory lap, and recovered in the SICU with open abdominal site and 3 new drains with 2 prior RP drains remaining.   -care per primary team     Problem 4: Seizure disorder  -continue Vimpat and appreciate neuro recs  -care per primary team     I have reviewed clinical labs tests and reports, radiology tests and reports, as well as old patient medical records, and discussed with the referring physician.

## 2023-05-31 NOTE — PROGRESS NOTE ADULT - SUBJECTIVE AND OBJECTIVE BOX
Patient is a 53y Male seen and evaluated at bedside on CVVHD. patient spiking fevers o/n tachycardic to the 160's uop improving 456cc/24 hours  K 4.3 bicarb 22 hgb 6.6       Meds:    acetylcysteine 20%  Inhalation 4 every 6 hours  albuterol/ipratropium for Nebulization 3 every 6 hours  artificial  tears Solution 1 two times a day  aspirin  chewable 81 daily  chlorhexidine 0.12% Liquid 15 every 12 hours  chlorhexidine 2% Cloths 1 daily  CRRT Treatment  <Continuous>  dexMEDEtomidine Infusion 0.2 <Continuous>  dextrose 5%. 1000 <Continuous>  dextrose 5%. 1000 <Continuous>  dextrose 50% Injectable 25 once  dextrose 50% Injectable 12.5 once  dextrose 50% Injectable 25 once  dextrose Oral Gel 15 once PRN  glucagon  Injectable 1 once  HYDROmorphone  Injectable 0.5 every 6 hours PRN  HYDROmorphone  Injectable 1 every 4 hours  HYDROmorphone  Injectable 1 every 6 hours PRN  insulin lispro (ADMELOG) corrective regimen sliding scale  every 6 hours  lacosamide IVPB 250 every 12 hours  lipid, fat emulsion (Fish Oil and Plant Based) 20% Infusion 0.7143 <Continuous>  meropenem  IVPB 1000 every 8 hours  norepinephrine Infusion 0.05 <Continuous>  pantoprazole  Injectable 40 two times a day  Parenteral Nutrition - Adult 1 <Continuous>  Phoxillum Filtration BK 4 / 2.5 5000 <Continuous>  sodium chloride 3%  Inhalation 4 every 6 hours      T(C): , Max: 38.6 (05-31-23 @ 02:00)  T(F): , Max: 101.5 (05-31-23 @ 02:00)  HR: 161 (05-31-23 @ 07:00)  BP: --  BP(mean): --  RR: 18 (05-31-23 @ 07:00)  SpO2: 100% (05-31-23 @ 07:00)  Wt(kg): --    05-30 @ 07:01  -  05-31 @ 07:00  --------------------------------------------------------  IN: 3496.3 mL / OUT: 3642 mL / NET: -145.7 mL    05-31 @ 07:01  -  05-31 @ 07:44  --------------------------------------------------------  IN: 17.5 mL / OUT: 0 mL / NET: 17.5 mL          Review of Systems:  unable to participate       PHYSICAL EXAM:  GENERAL: intubated; sedated  CHEST/LUNG: mechacnial breath sounds BL  HEART: normal S1S2, RRR  ABDOMEN:firm; multiple drains in place   EXTREMITIES: + edema BL LE  SKIN: no lesions or rashes   ACCESS: good thrill and bruit appreciated      LABS:                        7.2    14.28 )-----------( 37       ( 31 May 2023 04:17 )             21.3     05-31    132<L>  |  102  |  30<H>  ----------------------------<  138<H>  4.3   |  22  |  0.79    Ca    8.0<L>      31 May 2023 04:17  Phos  5.3     05-31  Mg     2.3     05-31    TPro  5.1<L>  /  Alb  1.9<L>  /  TBili  2.0<H>  /  DBili  1.4<H>  /  AST  22  /  ALT  9<L>  /  AlkPhos  134<H>  05-31      PT/INR - ( 31 May 2023 04:17 )   PT: 16.2 sec;   INR: 1.36          PTT - ( 31 May 2023 04:17 )  PTT:35.8 sec    Phosphorus Level, Serum: 5.3 mg/dL (05-31-23 @ 04:17)  Hemoglobin: 7.2 g/dL (05-31-23 @ 04:17)  Hemoglobin: 6.6 g/dL (05-30-23 @ 22:38)  Phosphorus Level, Serum: 5.0 mg/dL (05-30-23 @ 22:38)    Albumin, Serum: 1.9 g/dL (05-31-23 @ 04:17)  Albumin, Serum: 2.0 g/dL (05-30-23 @ 05:04)      CRRT Treatment:   Modality: CVVHD, Filter: NxStage CAR-505, Target Blood Flow: 300 mL/Min  Target Fluid Balance: Titrate to net NEGATIVE fluid balance, 100 mL/Hr (05-30-23 @ 10:15) [Active]  Phoxillum Filtration BK 4 / 2.5: Solution, 5000 milliLiter(s) infuse at 1500 mL/Hr; infuse through CRRT Circuit  Administration Instructions: Continuous Renal Replacement Therapy (CRRT)  Special Instructions: Dialysate (05-30-23 @ 10:15) [Active]        RADIOLOGY & ADDITIONAL STUDIES:           Patient is a 53y Male seen and evaluated at bedside on CVVHD. patient spiking fevers o/n tachycardic to the 160's uop improving 456cc/24 hours  K 4.3 bicarb 22 hgb 6.6 going to the OR this AM for exploration       Meds:    acetylcysteine 20%  Inhalation 4 every 6 hours  albuterol/ipratropium for Nebulization 3 every 6 hours  artificial  tears Solution 1 two times a day  aspirin  chewable 81 daily  chlorhexidine 0.12% Liquid 15 every 12 hours  chlorhexidine 2% Cloths 1 daily  CRRT Treatment  <Continuous>  dexMEDEtomidine Infusion 0.2 <Continuous>  dextrose 5%. 1000 <Continuous>  dextrose 5%. 1000 <Continuous>  dextrose 50% Injectable 25 once  dextrose 50% Injectable 12.5 once  dextrose 50% Injectable 25 once  dextrose Oral Gel 15 once PRN  glucagon  Injectable 1 once  HYDROmorphone  Injectable 0.5 every 6 hours PRN  HYDROmorphone  Injectable 1 every 4 hours  HYDROmorphone  Injectable 1 every 6 hours PRN  insulin lispro (ADMELOG) corrective regimen sliding scale  every 6 hours  lacosamide IVPB 250 every 12 hours  lipid, fat emulsion (Fish Oil and Plant Based) 20% Infusion 0.7143 <Continuous>  meropenem  IVPB 1000 every 8 hours  norepinephrine Infusion 0.05 <Continuous>  pantoprazole  Injectable 40 two times a day  Parenteral Nutrition - Adult 1 <Continuous>  Phoxillum Filtration BK 4 / 2.5 5000 <Continuous>  sodium chloride 3%  Inhalation 4 every 6 hours      T(C): , Max: 38.6 (05-31-23 @ 02:00)  T(F): , Max: 101.5 (05-31-23 @ 02:00)  HR: 161 (05-31-23 @ 07:00)  BP: --  BP(mean): --  RR: 18 (05-31-23 @ 07:00)  SpO2: 100% (05-31-23 @ 07:00)  Wt(kg): --    05-30 @ 07:01  -  05-31 @ 07:00  --------------------------------------------------------  IN: 3496.3 mL / OUT: 3642 mL / NET: -145.7 mL    05-31 @ 07:01  -  05-31 @ 07:44  --------------------------------------------------------  IN: 17.5 mL / OUT: 0 mL / NET: 17.5 mL          Review of Systems:  unable to participate       PHYSICAL EXAM:  GENERAL: intubated; sedated  CHEST/LUNG: mechacnial breath sounds BL  HEART: normal S1S2, RRR  ABDOMEN:firm; multiple drains in place   EXTREMITIES: + edema BL LE  SKIN: no lesions or rashes   ACCESS: IJ HD cath       LABS:                        7.2    14.28 )-----------( 37       ( 31 May 2023 04:17 )             21.3     05-31    132<L>  |  102  |  30<H>  ----------------------------<  138<H>  4.3   |  22  |  0.79    Ca    8.0<L>      31 May 2023 04:17  Phos  5.3     05-31  Mg     2.3     05-31    TPro  5.1<L>  /  Alb  1.9<L>  /  TBili  2.0<H>  /  DBili  1.4<H>  /  AST  22  /  ALT  9<L>  /  AlkPhos  134<H>  05-31      PT/INR - ( 31 May 2023 04:17 )   PT: 16.2 sec;   INR: 1.36          PTT - ( 31 May 2023 04:17 )  PTT:35.8 sec    Phosphorus Level, Serum: 5.3 mg/dL (05-31-23 @ 04:17)  Hemoglobin: 7.2 g/dL (05-31-23 @ 04:17)  Hemoglobin: 6.6 g/dL (05-30-23 @ 22:38)  Phosphorus Level, Serum: 5.0 mg/dL (05-30-23 @ 22:38)    Albumin, Serum: 1.9 g/dL (05-31-23 @ 04:17)  Albumin, Serum: 2.0 g/dL (05-30-23 @ 05:04)      CRRT Treatment:   Modality: CVVHD, Filter: NxStage CAR-505, Target Blood Flow: 300 mL/Min  Target Fluid Balance: Titrate to net NEGATIVE fluid balance, 100 mL/Hr (05-30-23 @ 10:15) [Active]  Phoxillum Filtration BK 4 / 2.5: Solution, 5000 milliLiter(s) infuse at 1500 mL/Hr; infuse through CRRT Circuit  Administration Instructions: Continuous Renal Replacement Therapy (CRRT)  Special Instructions: Dialysate (05-30-23 @ 10:15) [Active]        RADIOLOGY & ADDITIONAL STUDIES:           Patient is a 53y Male seen and evaluated at bedside on CVVHD. patient spiking fevers o/n tachycardic to the 160's uop improving 456cc/24 hours  K 4.3 bicarb 22 hgb 6.6 going to the OR this AM for exploration sp 1 unit pRBC this AM      Meds:    acetylcysteine 20%  Inhalation 4 every 6 hours  albuterol/ipratropium for Nebulization 3 every 6 hours  artificial  tears Solution 1 two times a day  aspirin  chewable 81 daily  chlorhexidine 0.12% Liquid 15 every 12 hours  chlorhexidine 2% Cloths 1 daily  CRRT Treatment  <Continuous>  dexMEDEtomidine Infusion 0.2 <Continuous>  dextrose 5%. 1000 <Continuous>  dextrose 5%. 1000 <Continuous>  dextrose 50% Injectable 25 once  dextrose 50% Injectable 12.5 once  dextrose 50% Injectable 25 once  dextrose Oral Gel 15 once PRN  glucagon  Injectable 1 once  HYDROmorphone  Injectable 0.5 every 6 hours PRN  HYDROmorphone  Injectable 1 every 4 hours  HYDROmorphone  Injectable 1 every 6 hours PRN  insulin lispro (ADMELOG) corrective regimen sliding scale  every 6 hours  lacosamide IVPB 250 every 12 hours  lipid, fat emulsion (Fish Oil and Plant Based) 20% Infusion 0.7143 <Continuous>  meropenem  IVPB 1000 every 8 hours  norepinephrine Infusion 0.05 <Continuous>  pantoprazole  Injectable 40 two times a day  Parenteral Nutrition - Adult 1 <Continuous>  Phoxillum Filtration BK 4 / 2.5 5000 <Continuous>  sodium chloride 3%  Inhalation 4 every 6 hours      T(C): , Max: 38.6 (05-31-23 @ 02:00)  T(F): , Max: 101.5 (05-31-23 @ 02:00)  HR: 161 (05-31-23 @ 07:00)  BP: --  BP(mean): --  RR: 18 (05-31-23 @ 07:00)  SpO2: 100% (05-31-23 @ 07:00)  Wt(kg): --    05-30 @ 07:01  -  05-31 @ 07:00  --------------------------------------------------------  IN: 3496.3 mL / OUT: 3642 mL / NET: -145.7 mL    05-31 @ 07:01  -  05-31 @ 07:44  --------------------------------------------------------  IN: 17.5 mL / OUT: 0 mL / NET: 17.5 mL          Review of Systems:  unable to participate       PHYSICAL EXAM:  GENERAL: intubated; sedated  CHEST/LUNG: mechacnial breath sounds BL  HEART: normal S1S2, RRR  ABDOMEN:firm; multiple drains in place   EXTREMITIES: + edema BL LE  SKIN: no lesions or rashes   ACCESS: IJ HD cath       LABS:                        7.2    14.28 )-----------( 37       ( 31 May 2023 04:17 )             21.3     05-31    132<L>  |  102  |  30<H>  ----------------------------<  138<H>  4.3   |  22  |  0.79    Ca    8.0<L>      31 May 2023 04:17  Phos  5.3     05-31  Mg     2.3     05-31    TPro  5.1<L>  /  Alb  1.9<L>  /  TBili  2.0<H>  /  DBili  1.4<H>  /  AST  22  /  ALT  9<L>  /  AlkPhos  134<H>  05-31      PT/INR - ( 31 May 2023 04:17 )   PT: 16.2 sec;   INR: 1.36          PTT - ( 31 May 2023 04:17 )  PTT:35.8 sec    Phosphorus Level, Serum: 5.3 mg/dL (05-31-23 @ 04:17)  Hemoglobin: 7.2 g/dL (05-31-23 @ 04:17)  Hemoglobin: 6.6 g/dL (05-30-23 @ 22:38)  Phosphorus Level, Serum: 5.0 mg/dL (05-30-23 @ 22:38)    Albumin, Serum: 1.9 g/dL (05-31-23 @ 04:17)  Albumin, Serum: 2.0 g/dL (05-30-23 @ 05:04)      CRRT Treatment:   Modality: CVVHD, Filter: NxStage CAR-505, Target Blood Flow: 300 mL/Min  Target Fluid Balance: Titrate to net NEGATIVE fluid balance, 100 mL/Hr (05-30-23 @ 10:15) [Active]  Phoxillum Filtration BK 4 / 2.5: Solution, 5000 milliLiter(s) infuse at 1500 mL/Hr; infuse through CRRT Circuit  Administration Instructions: Continuous Renal Replacement Therapy (CRRT)  Special Instructions: Dialysate (05-30-23 @ 10:15) [Active]        RADIOLOGY & ADDITIONAL STUDIES:

## 2023-05-31 NOTE — PROGRESS NOTE ADULT - SUBJECTIVE AND OBJECTIVE BOX
ON: 6pm labs, hgb 7.5 (8.6). Mn labs, hgb 6.6 (7.5). Levo requir inc. Tachycardic. Afebrile. CVVHD run even. Dilaudid given for discomfort. 1u PRBC given. Heparin SQ held. Tachycardic, sinus 130's. Rectal temp 101.4. IV tylenol given for fever. CPAP 5/5 @0515. [place] more febrile tachycardic. Switched back to AC      SUBJECTIVE: Pt seen and examined at bedside this am by ICU team. Patient lying in bed, appears comfortable. Able to follow basic commands and report pain is slightly better controlled.       MEDICATIONS  (STANDING):  acetylcysteine 20%  Inhalation 4 milliLiter(s) Inhalation every 6 hours  albuterol/ipratropium for Nebulization 3 milliLiter(s) Nebulizer every 6 hours  artificial  tears Solution 1 Drop(s) Both EYES two times a day  aspirin  chewable 81 milliGRAM(s) Oral daily  chlorhexidine 0.12% Liquid 15 milliLiter(s) Oral Mucosa every 12 hours  chlorhexidine 2% Cloths 1 Application(s) Topical daily  CRRT Treatment    <Continuous>  dexMEDEtomidine Infusion 0.2 MICROgram(s)/kG/Hr (3.5 mL/Hr) IV Continuous <Continuous>  dextrose 5%. 1000 milliLiter(s) (100 mL/Hr) IV Continuous <Continuous>  dextrose 5%. 1000 milliLiter(s) (50 mL/Hr) IV Continuous <Continuous>  dextrose 50% Injectable 25 Gram(s) IV Push once  dextrose 50% Injectable 12.5 Gram(s) IV Push once  dextrose 50% Injectable 25 Gram(s) IV Push once  glucagon  Injectable 1 milliGRAM(s) IntraMuscular once  HYDROmorphone  Injectable 1 milliGRAM(s) IV Push every 4 hours  insulin lispro (ADMELOG) corrective regimen sliding scale   SubCutaneous every 6 hours  lacosamide IVPB 250 milliGRAM(s) IV Intermittent every 12 hours  lipid, fat emulsion (Fish Oil and Plant Based) 20% Infusion 0.7143 Gm/kG/Day (20.8 mL/Hr) IV Continuous <Continuous>  meropenem  IVPB 1000 milliGRAM(s) IV Intermittent every 8 hours  norepinephrine Infusion 0.05 MICROgram(s)/kG/Min (3.28 mL/Hr) IV Continuous <Continuous>  pantoprazole  Injectable 40 milliGRAM(s) IV Push two times a day  Parenteral Nutrition - Adult 1 Each (70 mL/Hr) TPN Continuous <Continuous>  Phoxillum Filtration BK 4 / 2.5 5000 milliLiter(s) (1500 mL/Hr) CRRT <Continuous>  sodium chloride 3%  Inhalation 4 milliLiter(s) Inhalation every 6 hours    MEDICATIONS  (PRN):  dextrose Oral Gel 15 Gram(s) Oral once PRN Blood Glucose LESS THAN 70 milliGRAM(s)/deciliter  HYDROmorphone  Injectable 0.5 milliGRAM(s) IV Push every 6 hours PRN Moderate Pain (4 - 6)  HYDROmorphone  Injectable 1 milliGRAM(s) IV Push every 6 hours PRN Severe Pain (7 - 10)      Drips:   Levophed gtt  Precedex gtt    ICU Vital Signs Last 24 Hrs  T(C): 38.2 (31 May 2023 05:00), Max: 38.6 (31 May 2023 02:00)  T(F): 100.8 (31 May 2023 05:00), Max: 101.5 (31 May 2023 02:00)  HR: 115 (31 May 2023 06:15) (76 - 133)  ABP: 105/57 (31 May 2023 05:00) (72/40 - 149/76)  ABP(mean): 77 (31 May 2023 05:00) (52 - 105)  RR: 17 (31 May 2023 05:00) (16 - 28)  SpO2: 100% (31 May 2023 06:15) (93% - 100%)    O2 Parameters below as of 31 May 2023 06:15  Patient On (Oxygen Delivery Method): ventilator            Physical Exam:  General: Resting in bed, follows basic commands   HEENT: NC/AT, EOMI, ETT and Dobhoff in place  Pulmonary: Mechanically ventilated, diminished breath sounds on L   Cardiovascular: NSR, no murmurs  Abdominal: soft, mild distention, minimal tenderness to palpation, No rebound/guarding. CODIE x5 noted in L abdomen and CODIE x2 noted in R abdomen - drains dark SS/light bilious  : Condom cath in place draining concentrated urine   Rectal: rectal tube in place with minimal output  Extremities: WWP, 3+ edema noted in B/L LE     Lines/tubes/drains: R HD cath, L TLC, R axillary A-line, PIVs  Cantrell:	  N/A    Vent settings:  Mode: AC/ CMV (Assist Control/ Continuous Mandatory Ventilation), RR (machine): 18, TV (machine): 500, FiO2: 40, PEEP: 5, ITime: 1, MAP: 8.5, PIP: 18    I&O's Summary    29 May 2023 07:01  -  30 May 2023 07:00  --------------------------------------------------------  IN: 3169.7 mL / OUT: 3182 mL / NET: -12.3 mL    30 May 2023 07:01  -  31 May 2023 06:33  --------------------------------------------------------  IN: 3139.3 mL / OUT: 3382 mL / NET: -242.7 mL        LABS:                        7.2    14.28 )-----------( 37       ( 31 May 2023 04:17 )             21.3     05-31    132<L>  |  102  |  30<H>  ----------------------------<  138<H>  4.3   |  22  |  0.79    Ca    8.0<L>      31 May 2023 04:17  Phos  5.3     05-31  Mg     2.3     05-31    TPro  5.1<L>  /  Alb  1.9<L>  /  TBili  2.0<H>  /  DBili  1.4<H>  /  AST  22  /  ALT  9<L>  /  AlkPhos  134<H>  05-31    PT/INR - ( 31 May 2023 04:17 )   PT: 16.2 sec;   INR: 1.36          PTT - ( 31 May 2023 04:17 )  PTT:35.8 sec    CAPILLARY BLOOD GLUCOSE      POCT Blood Glucose.: 140 mg/dL (30 May 2023 17:20)  POCT Blood Glucose.: 123 mg/dL (30 May 2023 11:41)    LIVER FUNCTIONS - ( 31 May 2023 04:17 )  Alb: 1.9 g/dL / Pro: 5.1 g/dL / ALK PHOS: 134 U/L / ALT: 9 U/L / AST: 22 U/L / GGT: x             Cultures:Culture Results:   No growth at 1 day. (05-29 @ 11:26)  Culture Results:   No growth at 1 day. (05-29 @ 11:26)      RADIOLOGY & ADDITIONAL STUDIES:     ON: 6pm labs, hgb 7.5 (8.6). Mn labs, hgb 6.6 (7.5). Levo requir inc. Tachycardic. Afebrile. CVVHD run even. Dilaudid given for discomfort. 1u PRBC given. Heparin SQ held. Tachycardic, sinus 130's. Rectal temp 101.4. IV tylenol given for fever. CPAP 5/5 @0515. Becoming more febrile tachycardic. Switched back to AC      SUBJECTIVE: Pt seen and examined at bedside this am by ICU team. Patient lying in bed, appears slightly uncomfortable. Reports he still has abdominal pain, but it is improving from prior. Able to follow basic commands.       MEDICATIONS  (STANDING):  acetylcysteine 20%  Inhalation 4 milliLiter(s) Inhalation every 6 hours  albuterol/ipratropium for Nebulization 3 milliLiter(s) Nebulizer every 6 hours  artificial  tears Solution 1 Drop(s) Both EYES two times a day  aspirin  chewable 81 milliGRAM(s) Oral daily  chlorhexidine 0.12% Liquid 15 milliLiter(s) Oral Mucosa every 12 hours  chlorhexidine 2% Cloths 1 Application(s) Topical daily  CRRT Treatment    <Continuous>  dexMEDEtomidine Infusion 0.2 MICROgram(s)/kG/Hr (3.5 mL/Hr) IV Continuous <Continuous>  dextrose 5%. 1000 milliLiter(s) (100 mL/Hr) IV Continuous <Continuous>  dextrose 5%. 1000 milliLiter(s) (50 mL/Hr) IV Continuous <Continuous>  dextrose 50% Injectable 25 Gram(s) IV Push once  dextrose 50% Injectable 12.5 Gram(s) IV Push once  dextrose 50% Injectable 25 Gram(s) IV Push once  glucagon  Injectable 1 milliGRAM(s) IntraMuscular once  HYDROmorphone  Injectable 1 milliGRAM(s) IV Push every 4 hours  insulin lispro (ADMELOG) corrective regimen sliding scale   SubCutaneous every 6 hours  lacosamide IVPB 250 milliGRAM(s) IV Intermittent every 12 hours  lipid, fat emulsion (Fish Oil and Plant Based) 20% Infusion 0.7143 Gm/kG/Day (20.8 mL/Hr) IV Continuous <Continuous>  meropenem  IVPB 1000 milliGRAM(s) IV Intermittent every 8 hours  norepinephrine Infusion 0.05 MICROgram(s)/kG/Min (3.28 mL/Hr) IV Continuous <Continuous>  pantoprazole  Injectable 40 milliGRAM(s) IV Push two times a day  Parenteral Nutrition - Adult 1 Each (70 mL/Hr) TPN Continuous <Continuous>  Phoxillum Filtration BK 4 / 2.5 5000 milliLiter(s) (1500 mL/Hr) CRRT <Continuous>  sodium chloride 3%  Inhalation 4 milliLiter(s) Inhalation every 6 hours    MEDICATIONS  (PRN):  dextrose Oral Gel 15 Gram(s) Oral once PRN Blood Glucose LESS THAN 70 milliGRAM(s)/deciliter  HYDROmorphone  Injectable 0.5 milliGRAM(s) IV Push every 6 hours PRN Moderate Pain (4 - 6)  HYDROmorphone  Injectable 1 milliGRAM(s) IV Push every 6 hours PRN Severe Pain (7 - 10)      Drips:   Levophed gtt  Precedex gtt    ICU Vital Signs Last 24 Hrs  T(C): 38.2 (31 May 2023 05:00), Max: 38.6 (31 May 2023 02:00)  T(F): 100.8 (31 May 2023 05:00), Max: 101.5 (31 May 2023 02:00)  HR: 115 (31 May 2023 06:15) (76 - 133)  ABP: 105/57 (31 May 2023 05:00) (72/40 - 149/76)  ABP(mean): 77 (31 May 2023 05:00) (52 - 105)  RR: 17 (31 May 2023 05:00) (16 - 28)  SpO2: 100% (31 May 2023 06:15) (93% - 100%)    O2 Parameters below as of 31 May 2023 06:15  Patient On (Oxygen Delivery Method): ventilator            Physical Exam:  General: Resting in bed, uncomfortable appearing, follows basic commands   HEENT: NC/AT, EOMI, ETT and Dobhoff in place  Pulmonary: Mechanically ventilated, diminished breath sounds on L   Cardiovascular: NSR, no murmurs  Abdominal: soft, mildly distended, moderate generalized tenderness to palpation, No rebound/guarding. CODIE x5 noted in L abdomen and CODIE x2 noted in R abdomen - drains dark SS/serous  : Condom cath in place draining concentrated urine   Rectal: rectal tube in place with brown liquid stool noted  Extremities: WWP, 3+ edema noted in B/L LE     Lines/tubes/drains: R HD cath, L TLC, R axillary A-line, PIVs  Cantrell:	  N/A    Vent settings:  Mode: AC/ CMV (Assist Control/ Continuous Mandatory Ventilation), RR (machine): 18, TV (machine): 500, FiO2: 40, PEEP: 5, ITime: 1, MAP: 8.5, PIP: 18    I&O's Summary    29 May 2023 07:01  -  30 May 2023 07:00  --------------------------------------------------------  IN: 3169.7 mL / OUT: 3182 mL / NET: -12.3 mL    30 May 2023 07:01  -  31 May 2023 06:33  --------------------------------------------------------  IN: 3139.3 mL / OUT: 3382 mL / NET: -242.7 mL        LABS:                        7.2    14.28 )-----------( 37       ( 31 May 2023 04:17 )             21.3     05-31    132<L>  |  102  |  30<H>  ----------------------------<  138<H>  4.3   |  22  |  0.79    Ca    8.0<L>      31 May 2023 04:17  Phos  5.3     05-31  Mg     2.3     05-31    TPro  5.1<L>  /  Alb  1.9<L>  /  TBili  2.0<H>  /  DBili  1.4<H>  /  AST  22  /  ALT  9<L>  /  AlkPhos  134<H>  05-31    PT/INR - ( 31 May 2023 04:17 )   PT: 16.2 sec;   INR: 1.36          PTT - ( 31 May 2023 04:17 )  PTT:35.8 sec    CAPILLARY BLOOD GLUCOSE      POCT Blood Glucose.: 140 mg/dL (30 May 2023 17:20)  POCT Blood Glucose.: 123 mg/dL (30 May 2023 11:41)    LIVER FUNCTIONS - ( 31 May 2023 04:17 )  Alb: 1.9 g/dL / Pro: 5.1 g/dL / ALK PHOS: 134 U/L / ALT: 9 U/L / AST: 22 U/L / GGT: x             Cultures:Culture Results:   No growth at 1 day. (05-29 @ 11:26)  Culture Results:   No growth at 1 day. (05-29 @ 11:26)      RADIOLOGY & ADDITIONAL STUDIES:

## 2023-05-31 NOTE — PROGRESS NOTE ADULT - ASSESSMENT
54yo Male pt with PMH HTN, type A aortic dissection s/p Dacron grafts, AV resuspension in 2013, CAD s/p CABG x 1 SVG to RCA (5/2013 with Dr. Medina), seizure disorder (last episode on 7/4/22) S/P replacement of transverse aortic arch, second stage thoracic endovascular aortic repair, aorto-axillary bypass, AV replacement (bio 23mm), in APr 2023 and CABG x 1 (SVG-RCA) EF 75% (Ignacio, 3/20) now s/p 2nd state TEVAR on 5/5 for whom surgery was consulted for finding of acute pancreatitis with large peripancreatic/perigastric fluid collections on CTA abdomen 5/8 then acute hemorrhage starting 5/12/23 requiring 11 U pRBC over last 48 hours but with negative mesenteric angiogram 5/13/23. S/p paracentesis and LUQ collection aspiration (no drain per surgery) on 5/22/23. LUQ collection growing Klebsiella pneumoniae.    5/24/23:  LUQ drain placement by IR.     5/25/23:  LUQ drain upsizing. Placement of two additional 14 F drainage catheters in the left mid and lower abdomen. Placement of a left abdominal hematoma drain.     5/26/23:   Placement of a right ascites drain and right pelvic hematoma/abscess drain by IR. Upsized left abdomen hematoma drain and left pelvic abscess/hematoma drain to 16 Fr.    5/30/23:  LUQ drain placement.     5/31/23:  To OR for ex-lap and abdominal washout. Multiple IR drains removed as described above.     -Continue to monitor output   -IR to follow.

## 2023-05-31 NOTE — PROGRESS NOTE ADULT - SUBJECTIVE AND OBJECTIVE BOX
Operation / Date:  5/5: second stage TEVAR  3/20: aorto-axillary bypass, AV replacement (bio 23mm), and CABG x 1 (SVG-RCA) EF 75%  2013: hx type A dissection and repair, CABG    SUBJECTIVE ASSESSMENT:  53y Male seen and examined at bedside now s/p exploratory lap for bleeding which began overnight into the morning. Pt is intubated and sedated    Vital Signs Last 24 Hrs  T(C): 38.2 (31 May 2023 08:26), Max: 38.6 (31 May 2023 02:00)  T(F): 99.6 (31 May 2023 08:00), Max: 101.5 (31 May 2023 02:00)  HR: 119 (31 May 2023 08:30) (76 - 161)  BP: 93/60 (31 May 2023 08:26) (93/60 - 93/60)  BP(mean): 72 (31 May 2023 08:26) (72 - 72)  RR: 18 (31 May 2023 08:30) (13 - 22)  SpO2: 100% (31 May 2023 08:30) (96% - 100%)    Parameters below as of 31 May 2023 08:30  Patient On (Oxygen Delivery Method): ventilator    O2 Concentration (%): 40  I&O's Detail    30 May 2023 07:01  -  31 May 2023 07:00  --------------------------------------------------------  IN:    Dexmedetomidine: 357.5 mL    Enteral Tube Flush: 70 mL    Fat Emulsion (Fish Oil &amp; Plant Based) 20% Infusion: 249.6 mL    IV PiggyBack: 250 mL    IV PiggyBack: 400 mL    IV PiggyBack: 150 mL    Nepro with Carb Steady: 50 mL    Norepinephrine: 190.8 mL    PRBCs (Packed Red Blood Cells): 300 mL    Propofol: 8.4 mL    TPN (Total Parenteral Nutrition): 1470 mL  Total IN: 3496.3 mL    OUT:    Bulb (mL): 35 mL    Bulb (mL): 105 mL    Drain (mL): 30 mL    Drain (mL): 205 mL    Drain (mL): 48 mL    Drain (mL): 58 mL    Drain (mL): 10 mL    Drain (mL): 260 mL    Incontinent per Condom Catheter (mL): 1 mL    Other (mL): 2404 mL    Voided (mL): 486 mL  Total OUT: 3642 mL    Total NET: -145.7 mL      31 May 2023 07:01  -  31 May 2023 13:34  --------------------------------------------------------  IN:    Dexmedetomidine: 17.5 mL    Norepinephrine: 16.4 mL    TPN (Total Parenteral Nutrition): 70 mL  Total IN: 103.9 mL    OUT:  Total OUT: 0 mL    Total NET: 103.9 mL    PHYSICAL EXAM:  GEN: NAD, looks comfortable  Neuro: intubated and sedated   HEENT: intubated   CV: S1S2, regular  Lungs: b/l crackles appreciated at bases   ABD: Soft, non-distended.   EXT: Warm and well perfused. 2+ peripheral edema noted  Incisions: Old MSI is healed, all other incisions appear to be healing well. Now with open abdominal incision with dressing over and 3 new abdominal drains in place with 2 prior remaining.     LABS:                        7.4    13.11 )-----------( 26       ( 31 May 2023 12:32 )             21.7       PT/INR - ( 31 May 2023 12:33 )   PT: 16.0 sec;   INR: 1.34          PTT - ( 31 May 2023 12:33 )  PTT:33.3 sec    05-31    134<L>  |  104  |  35<H>  ----------------------------<  109<H>  4.5   |  23  |  0.89    Ca    7.7<L>      31 May 2023 12:33  Phos  5.4     05-31  Mg     2.3     05-31    TPro  4.5<L>  /  Alb  1.9<L>  /  TBili  2.4<H>  /  DBili  1.8<H>  /  AST  80<H>  /  ALT  23  /  AlkPhos  99  05-31    MEDICATIONS  (STANDING):  fentaNYL   Infusion... 0.5 MICROgram(s)/kG/Hr (1.75 mL/Hr) IV Continuous <Continuous>  lacosamide IVPB 250 milliGRAM(s) IV Intermittent every 12 hours  meropenem  IVPB 1000 milliGRAM(s) IV Intermittent every 8 hours  norepinephrine Infusion 0.03 MICROgram(s)/kG/Min (3.94 mL/Hr) IV Continuous <Continuous>  pantoprazole  Injectable 40 milliGRAM(s) IV Push daily  Parenteral Nutrition - Adult 1 Each (70 mL/Hr) TPN Continuous <Continuous>  Parenteral Nutrition - Adult 1 Each (70 mL/Hr) TPN Continuous <Continuous>  propofol Infusion 10 MICROgram(s)/kG/Min (4.2 mL/Hr) IV Continuous <Continuous>  vasopressin Infusion 0.04 Unit(s)/Min (6 mL/Hr) IV Continuous <Continuous>    RADIOLOGY & ADDITIONAL TESTS:  < from: Xray Chest 1 View- PORTABLE-Urgent (Xray Chest 1 View- PORTABLE-Urgent .) (05.31.23 @ 08:01) >    Findings/  impression: Left opacity/pleural effusion, decreased. Right opacity,   unchanged. Satisfactory positioning of support devices. Stable   cardiomegaly, status post median sternotomy, aortic endovascular stent,   aortic valve replacement, aortic dissection.. Left axillary surgical   clips.    --- End of Report ---    < end of copied text >

## 2023-05-31 NOTE — PRE-ANESTHESIA EVALUATION ADULT - NSANTHPEFT_GEN_ALL_CORE
intubated, sedated  lungs: BS B/L  cor: tachycardic intubated, sedated  lungs: BS B/L  cor: tachycardic  extr: edematous

## 2023-05-31 NOTE — PROGRESS NOTE ADULT - SUBJECTIVE AND OBJECTIVE BOX
Pt seen and examined at bedside.  Tachycardic overnight with increasing levo requirement  1 unit pRBC given  Additional CODIE drain placed by IR 5/30  Tmax 101.5    Allergies    No Known Allergies    Intolerances        MEDICATIONS:  MEDICATIONS  (STANDING):  lipid, fat emulsion (Fish Oil and Plant Based) 20% Infusion 0.7143 Gm/kG/Day (20.8 mL/Hr) IV Continuous <Continuous>  Parenteral Nutrition - Adult 1 Each (70 mL/Hr) TPN Continuous <Continuous>    MEDICATIONS  (PRN):      Vital Signs Last 24 Hrs  T(C): 38.2 (31 May 2023 08:26), Max: 38.6 (31 May 2023 02:00)  T(F): 99.6 (31 May 2023 08:00), Max: 101.5 (31 May 2023 02:00)  HR: 119 (31 May 2023 08:30) (76 - 161)  BP: 93/60 (31 May 2023 08:26) (93/60 - 93/60)  BP(mean): 72 (31 May 2023 08:26) (72 - 72)  RR: 18 (31 May 2023 08:30) (13 - 26)  SpO2: 100% (31 May 2023 08:30) (96% - 100%)    Parameters below as of 31 May 2023 08:30  Patient On (Oxygen Delivery Method): ventilator    O2 Concentration (%): 40    05-30 @ 07:01  -  05-31 @ 07:00  --------------------------------------------------------  IN: 3496.3 mL / OUT: 3642 mL / NET: -145.7 mL    05-31 @ 07:01  -  05-31 @ 09:16  --------------------------------------------------------  IN: 103.9 mL / OUT: 0 mL / NET: 103.9 mL        PHYSICAL EXAM:    General: Intubated, sedated  Cardiac: Tachycardic on tele 130s  Pulm: MV  Gastrointestinal: Tense, pt winces with light tough.  Peritonitic CODIE is place    LABS:  CBC Full  -  ( 31 May 2023 07:59 )  WBC Count : 14.24 K/uL  RBC Count : 2.06 M/uL  Hemoglobin : 6.2 g/dL  Hematocrit : 18.6 %  Platelet Count - Automated : 35 K/uL  Mean Cell Volume : 90.3 fl  Mean Cell Hemoglobin : 30.1 pg  Mean Cell Hemoglobin Concentration : 33.3 gm/dL  Auto Neutrophil # : x  Auto Lymphocyte # : x  Auto Monocyte # : x  Auto Eosinophil # : x  Auto Basophil # : x  Auto Neutrophil % : x  Auto Lymphocyte % : x  Auto Monocyte % : x  Auto Eosinophil % : x  Auto Basophil % : x    05-31    132<L>  |  102  |  31<H>  ----------------------------<  132<H>  4.6   |  22  |  0.84    Ca    7.3<L>      31 May 2023 07:59  Phos  5.4     05-31  Mg     2.3     05-31    TPro  4.7<L>  /  Alb  2.0<L>  /  TBili  2.0<H>  /  DBili  x   /  AST  26  /  ALT  10  /  AlkPhos  119  05-31    PT/INR - ( 31 May 2023 04:17 )   PT: 16.2 sec;   INR: 1.36          PTT - ( 31 May 2023 04:17 )  PTT:35.8 sec                  RADIOLOGY & ADDITIONAL STUDIES:   Pt seen and examined at bedside.  Tachycardic overnight with increasing levo requirement  1 unit PRBC given  Additional CODIE drain placed by IR 5/30  Tmax 101.5    Allergies    No Known Allergies    Intolerances        MEDICATIONS:  MEDICATIONS  (STANDING):  lipid, fat emulsion (Fish Oil and Plant Based) 20% Infusion 0.7143 Gm/kG/Day (20.8 mL/Hr) IV Continuous <Continuous>  Parenteral Nutrition - Adult 1 Each (70 mL/Hr) TPN Continuous <Continuous>    MEDICATIONS  (PRN):      Vital Signs Last 24 Hrs  T(C): 38.2 (31 May 2023 08:26), Max: 38.6 (31 May 2023 02:00)  T(F): 99.6 (31 May 2023 08:00), Max: 101.5 (31 May 2023 02:00)  HR: 119 (31 May 2023 08:30) (76 - 161)  BP: 93/60 (31 May 2023 08:26) (93/60 - 93/60)  BP(mean): 72 (31 May 2023 08:26) (72 - 72)  RR: 18 (31 May 2023 08:30) (13 - 26)  SpO2: 100% (31 May 2023 08:30) (96% - 100%)    Parameters below as of 31 May 2023 08:30  Patient On (Oxygen Delivery Method): ventilator    O2 Concentration (%): 40    05-30 @ 07:01  -  05-31 @ 07:00  --------------------------------------------------------  IN: 3496.3 mL / OUT: 3642 mL / NET: -145.7 mL    05-31 @ 07:01  -  05-31 @ 09:16  --------------------------------------------------------  IN: 103.9 mL / OUT: 0 mL / NET: 103.9 mL        PHYSICAL EXAM:    General: Intubated, sedated  Cardiac: Tachycardic on tele 130s  Pulm: MV  Gastrointestinal: Tense, pt winces with light tough.  Peritonitic CODIE is place    LABS:  CBC Full  -  ( 31 May 2023 07:59 )  WBC Count : 14.24 K/uL  RBC Count : 2.06 M/uL  Hemoglobin : 6.2 g/dL  Hematocrit : 18.6 %  Platelet Count - Automated : 35 K/uL  Mean Cell Volume : 90.3 fl  Mean Cell Hemoglobin : 30.1 pg  Mean Cell Hemoglobin Concentration : 33.3 gm/dL  Auto Neutrophil # : x  Auto Lymphocyte # : x  Auto Monocyte # : x  Auto Eosinophil # : x  Auto Basophil # : x  Auto Neutrophil % : x  Auto Lymphocyte % : x  Auto Monocyte % : x  Auto Eosinophil % : x  Auto Basophil % : x    05-31    132<L>  |  102  |  31<H>  ----------------------------<  132<H>  4.6   |  22  |  0.84    Ca    7.3<L>      31 May 2023 07:59  Phos  5.4     05-31  Mg     2.3     05-31    TPro  4.7<L>  /  Alb  2.0<L>  /  TBili  2.0<H>  /  DBili  x   /  AST  26  /  ALT  10  /  AlkPhos  119  05-31    PT/INR - ( 31 May 2023 04:17 )   PT: 16.2 sec;   INR: 1.36          PTT - ( 31 May 2023 04:17 )  PTT:35.8 sec                  RADIOLOGY & ADDITIONAL STUDIES:

## 2023-05-31 NOTE — PROGRESS NOTE ADULT - ASSESSMENT
53 year-old male with PMH HTN, type A aortic dissection s/p Dacron grafts, AV resuspension in 2013, CAD s/p CABG x 1 SVG to RCA (5/2013 with Dr. Medina), seizure disorder (last episode on 7/4/22) S/P replacement of transverse aortic arch, second stage thoracic endovascular aortic repair, aorto-axillary bypass, AV replacement (bio 23mm), in APr 2023 and CABG x 1 (SVG-RCA) EF 75% (Ignacio, 3/20) now s/p 2nd state TEVAR on 5/5, and was found to have acute pancreatitis with large peripancreatic/perigastric fluid collections on CTA abdomen, and abcess on 5/8. Epilepsy team initially consulted for suspected seizure event on 5/14/23 that did not correlate with findings on EEG recordings. Also there were concerns for drug-related (depakote) hemorrhagic pancreatitis, that is now least likely due to other potential causes such as cholecystitis and hereby the medication has been weaned. CTH obtained on 5/18 showed no acute pathology. Re-consulted for concerns for seizure-like event on 5/30.     RECOMMENDATIONS:   - Please connect vEEG monitoring   - c/w Vimpat 250mg Q12hrs  - Ativan 2mg IVP for seizures > 2mins  - Keep Mg>2 and K >4.   - Rest of management per primary team.    53 year-old male with PMH HTN, type A aortic dissection s/p Dacron grafts, AV resuspension in 2013, CAD s/p CABG x 1 SVG to RCA (5/2013 with Dr. Medina), seizure disorder (last episode on 7/4/22) S/P replacement of transverse aortic arch, second stage thoracic endovascular aortic repair, aorto-axillary bypass, AV replacement (bio 23mm), in APr 2023 and CABG x 1 (SVG-RCA) EF 75% (Ignacio, 3/20) now s/p 2nd state TEVAR on 5/5, and was found to have acute pancreatitis with large peripancreatic/perigastric fluid collections on CTA abdomen, and abcess on 5/8. Epilepsy team initially consulted for suspected seizure event on 5/14/23 that did not correlate with findings on EEG recordings. Also there were concerns for drug-related (depakote) hemorrhagic pancreatitis, that is now least likely due to other potential causes such as cholecystitis and hereby the medication has been weaned. CTH obtained on 5/18 showed no acute pathology. Re-consulted for concerns for brief facial twitching on 5/30.     RECOMMENDATIONS:   - Please connect vEEG monitoring   - c/w Vimpat 250mg Q12hrs  - Ativan 2mg IVP for seizures > 2mins  - Keep Mg>2 and K >4.   - Rest of management per primary team.

## 2023-05-31 NOTE — PROGRESS NOTE ADULT - ASSESSMENT
53 year old male hx seizure disorder, aortic dissection s/p AVR, aortic graft revision 3/20/2023, second stage TEVAR 5/5/2023, course c/b peripancreatic/RP hemorrhage/hematoma formation. Suspect fevers due to enlarged collection as seen on CT. Now with change in clinical status, increasing pressor requirement and going to OR for acute abdomen.     Recommend:  - Continue Meropenem 1 gram IV q8hrs  - Restart Caspofungin IV 50mg q24hrs   - Given worsening clinical status, start Vancomycin 1G q12hrs, with trough prior to fourth dose    - Follow up repeat cultures     ID team 1 will continue to follow  53 year old male hx seizure disorder, aortic dissection s/p AVR, aortic graft revision 3/20/2023, second stage TEVAR 5/5/2023, course c/b peripancreatic/RP hemorrhage/hematoma formation. Suspect fevers due to enlarged collection as seen on CT. Now with change in clinical status, increasing pressor requirement and going to OR for acute abdomen.     Recommend:  - Continue Meropenem 1 gram IV q8hrs  - Restart Caspofungin IV 50mg q24hrs   - Given worsening clinical status, start Vancomycin (dose based on level of renal replacement)  - Follow up repeat cultures     ID team 1 will continue to follow

## 2023-05-31 NOTE — PROGRESS NOTE ADULT - ASSESSMENT
54yo Male pt with PMH HTN, type A aortic dissection s/p Dacron grafts, AV resuspension in 2013, CAD s/p CABG x 1 SVG to RCA (5/2013 with Dr. Medina), seizure disorder (last episode on 7/4/22) S/P replacement of transverse aortic arch, second stage thoracic endovascular aortic repair, aorto-axillary bypass, AV replacement (bio 23mm), in APr 2023 and CABG x 1 (SVG-RCA) EF 75% ([place], 3/20) now s/p 2nd state TEVAR on 5/5 with post op course c/b acute pancreatitis with large peripancreatic/perigastric fluid collections on CTA abdomen 5/8 with subsequent abdominal hemorrhage noted 5/12/23 requiring 11 U pRBC but with negative mesenteric angiogram 5/13/23. S/p paracentesis and LUQ collection aspiration (no drain per surgery) on 5/22/23. LUQ collection growing Klebsiella pneumoniae.    CDOIE drains as noted  16 Fr teal LUQ peripancreatic abscess "Left Pancreatic CODIE Drain" placed on 5/24:   16 Fr teal LUQ hematoma "Left Abdominal Hematoma CODIE Drain" placed on 5/25 and upsized on 5/26:   14 Fr white left mid abdomen pigtail ascites drain "Mid Abdominal Ascites CODIE Drain" placed on 5/25:  16 Fr teal pelvic abscess/hematoma drain "Left Pelvic Hematoma" (previously "Lower Abdominal Ascites CODIE Drain" placed on 5/25) and upsized on 5/26.  16 Fr teal ascites drain "Right/Ascites drain" placed on 5/26:  16 Fr teal pelvic hematoma/abscess drain "Right Pelvic/hematoma drain" placed on 5/26  16 Fr LUQ peripanc abscess placed on 5/30    CT A/P from 5/29 reviewed, noting persistence of multiple collections within the peritoneal cavity and retroperitoneum, with increase in size of LUQ collection with pigtail catheter.  Remaining collections stable/decreased in size    Continued downtrend in Hgb with worsening tachycardia and abd exam with peritonitis highly concerning for ongoing intrabdominal bleeding.  CODIE drains in LUQ hematoma have drained nearly 800 ccs of blood in last 72 hours.  Worsening thrombocytopenia also raises concern for DIC.     Recommendations:  -No role for endoscopic drainage at this time given hemodynamic instability, critical illness  -Concerns discussed with surgical team at bedside; consideration for OR ongoing vs repeat abd imaging  -Consider DIC evaluation  -Remainder of care per SICU    Seen at bedside with Dr. Jaqueline Potts DO  Gastroenterology Fellow  Pager: [phone]  After 5PM or on weekends, please contact Eric Hill  for fellow on call

## 2023-05-31 NOTE — PROGRESS NOTE ADULT - SUBJECTIVE AND OBJECTIVE BOX
Subjective  No events overnight. No complaints currently.    ROS  As above, otherwise negative for constitutional/HEENT/CV/pulm/GI//MSK/neuro/derm/endocrine/psych.     MEDICATIONS  (STANDING):  acetylcysteine 20%  Inhalation 4 milliLiter(s) Inhalation every 6 hours  artificial  tears Solution 1 Drop(s) Both EYES two times a day  chlorhexidine 2% Cloths 1 Application(s) Topical daily  dextrose 50% Injectable 25 Gram(s) IV Push once  fentaNYL   Infusion... 0.5 MICROgram(s)/kG/Hr (1.75 mL/Hr) IV Continuous <Continuous>  glucagon  Injectable 1 milliGRAM(s) IntraMuscular once  lacosamide IVPB 250 milliGRAM(s) IV Intermittent every 12 hours  meropenem  IVPB 1000 milliGRAM(s) IV Intermittent every 8 hours  norepinephrine Infusion 0.03 MICROgram(s)/kG/Min (3.94 mL/Hr) IV Continuous <Continuous>  pantoprazole  Injectable 40 milliGRAM(s) IV Push daily  Parenteral Nutrition - Adult 1 Each (70 mL/Hr) TPN Continuous <Continuous>  Parenteral Nutrition - Adult 1 Each (70 mL/Hr) TPN Continuous <Continuous>  propofol Infusion 10 MICROgram(s)/kG/Min (4.2 mL/Hr) IV Continuous <Continuous>  sodium chloride 3%  Inhalation 4 milliLiter(s) Inhalation every 6 hours  vasopressin Infusion 0.04 Unit(s)/Min (6 mL/Hr) IV Continuous <Continuous>    MEDICATIONS  (PRN):  dextrose Oral Gel 15 Gram(s) Oral once PRN Blood Glucose LESS THAN 70 milliGRAM(s)/deciliter      T(C): 38.2 (05-31-23 @ 08:26), Max: 38.6 (05-31-23 @ 02:00)  HR: 108 (05-31-23 @ 13:30) (76 - 161)  BP: 93/60 (05-31-23 @ 08:26) (93/60 - 93/60)  RR: 24 (05-31-23 @ 13:30) (13 - 24)  SpO2: 100% (05-31-23 @ 13:30) (96% - 100%)  Wt(kg): --    Eyes open spontaneously. Conversational with appropriate sentences.  EOMI. Visual fields full. PERRL 3>2. Face symmetrical.  Full strength throughout.  Finger-nose-finger intact R/L.  Intact to light touch throughout.    CBC Full  -  ( 31 May 2023 12:32 )  WBC Count : 13.11 K/uL  RBC Count : 2.48 M/uL  Hemoglobin : 7.4 g/dL  Hematocrit : 21.7 %  Platelet Count - Automated : 26 K/uL  Mean Cell Volume : 87.5 fl  Mean Cell Hemoglobin : 29.8 pg  Mean Cell Hemoglobin Concentration : 34.1 gm/dL  Auto Neutrophil # : x  Auto Lymphocyte # : x  Auto Monocyte # : x  Auto Eosinophil # : x  Auto Basophil # : x  Auto Neutrophil % : x  Auto Lymphocyte % : x  Auto Monocyte % : x  Auto Eosinophil % : x  Auto Basophil % : x    05-31    134<L>  |  104  |  35<H>  ----------------------------<  109<H>  4.5   |  23  |  0.89    Ca    7.7<L>      31 May 2023 12:33  Phos  5.4     05-31  Mg     2.3     05-31    TPro  4.5<L>  /  Alb  1.9<L>  /  TBili  2.4<H>  /  DBili  1.8<H>  /  AST  80<H>  /  ALT  23  /  AlkPhos  99  05-31    LIVER FUNCTIONS - ( 31 May 2023 12:33 )  Alb: 1.9 g/dL / Pro: 4.5 g/dL / ALK PHOS: 99 U/L / ALT: 23 U/L / AST: 80 U/L / GGT: x           PT/INR - ( 31 May 2023 12:33 )   PT: 16.0 sec;   INR: 1.34          PTT - ( 31 May 2023 12:33 )  PTT:33.3 sec      EEG: EPILEPSY PROGRESS NOTE:   Patient seen and examined at bedside, and had recently returned from the OR for Exploratory Laparotomy, Abdominal washout, and placement of drains. Remains intubated and sedated on Propofol 35mcg/kg/min and fentanyl 1mcg/kg/hr but minimally arousable. Concerns for potential seizure event on 5/30/23 described as R eye twicthing.     REVIEW OF SYSTEMS:   Unobtainable 2/2 intubation and minimally responsive     MEDICATIONS  (STANDING):  acetylcysteine 20%  Inhalation 4 milliLiter(s) Inhalation every 6 hours  artificial  tears Solution 1 Drop(s) Both EYES two times a day  chlorhexidine 2% Cloths 1 Application(s) Topical daily  dextrose 50% Injectable 25 Gram(s) IV Push once  fentaNYL   Infusion... 0.5 MICROgram(s)/kG/Hr (1.75 mL/Hr) IV Continuous <Continuous>  glucagon  Injectable 1 milliGRAM(s) IntraMuscular once  lacosamide IVPB 250 milliGRAM(s) IV Intermittent every 12 hours  meropenem  IVPB 1000 milliGRAM(s) IV Intermittent every 8 hours  norepinephrine Infusion 0.03 MICROgram(s)/kG/Min (3.94 mL/Hr) IV Continuous <Continuous>  pantoprazole  Injectable 40 milliGRAM(s) IV Push daily  Parenteral Nutrition - Adult 1 Each (70 mL/Hr) TPN Continuous <Continuous>  Parenteral Nutrition - Adult 1 Each (70 mL/Hr) TPN Continuous <Continuous>  propofol Infusion 10 MICROgram(s)/kG/Min (4.2 mL/Hr) IV Continuous <Continuous>  sodium chloride 3%  Inhalation 4 milliLiter(s) Inhalation every 6 hours  vasopressin Infusion 0.04 Unit(s)/Min (6 mL/Hr) IV Continuous <Continuous>    MEDICATIONS  (PRN):  dextrose Oral Gel 15 Gram(s) Oral once PRN Blood Glucose LESS THAN 70 milliGRAM(s)/deciliter    VITAL SIGNS:   T(C): 38.2 (05-31-23 @ 08:26), Max: 38.6 (05-31-23 @ 02:00)  HR: 108 (05-31-23 @ 13:30) (76 - 161)  BP: 93/60 (05-31-23 @ 08:26) (93/60 - 93/60)  RR: 24 (05-31-23 @ 13:30) (13 - 24)  SpO2: 100% (05-31-23 @ 13:30) (96% - 100%)  Wt(kg): --    PHYSICAL EXAM:   Intubated and sedated on propofol and fentanyl. Eyes open to name and noxious stimuli. R eye with mild ptosis. Tracks bedside activity intermittently.     LABS:   CBC Full  -  ( 31 May 2023 12:32 )  WBC Count : 13.11 K/uL  RBC Count : 2.48 M/uL  Hemoglobin : 7.4 g/dL  Hematocrit : 21.7 %  Platelet Count - Automated : 26 K/uL  Mean Cell Volume : 87.5 fl  Mean Cell Hemoglobin : 29.8 pg  Mean Cell Hemoglobin Concentration : 34.1 gm/dL  Auto Neutrophil # : x  Auto Lymphocyte # : x  Auto Monocyte # : x  Auto Eosinophil # : x  Auto Basophil # : x  Auto Neutrophil % : x  Auto Lymphocyte % : x  Auto Monocyte % : x  Auto Eosinophil % : x  Auto Basophil % : x    05-31    134<L>  |  104  |  35<H>  ----------------------------<  109<H>  4.5   |  23  |  0.89    Ca    7.7<L>      31 May 2023 12:33  Phos  5.4     05-31  Mg     2.3     05-31    TPro  4.5<L>  /  Alb  1.9<L>  /  TBili  2.4<H>  /  DBili  1.8<H>  /  AST  80<H>  /  ALT  23  /  AlkPhos  99  05-31    LIVER FUNCTIONS - ( 31 May 2023 12:33 )  Alb: 1.9 g/dL / Pro: 4.5 g/dL / ALK PHOS: 99 U/L / ALT: 23 U/L / AST: 80 U/L / GGT: x           PT/INR - ( 31 May 2023 12:33 )   PT: 16.0 sec;   INR: 1.34     PTT - ( 31 May 2023 12:33 )  PTT:33.3 sec      EEG: EPILEPSY PROGRESS NOTE:   Patient seen and examined at bedside, and had recently returned from the OR for Exploratory Laparotomy, Abdominal washout, and placement of drains. Remains intubated and sedated on Propofol 35mcg/kg/min and fentanyl 1mcg/kg/hr but minimally arousable. Concerns for potential seizure event on 5/30/23 described as R eye twicthing.     REVIEW OF SYSTEMS:   Unobtainable 2/2 intubation and minimally responsive     MEDICATIONS  (STANDING):  acetylcysteine 20%  Inhalation 4 milliLiter(s) Inhalation every 6 hours  artificial  tears Solution 1 Drop(s) Both EYES two times a day  chlorhexidine 2% Cloths 1 Application(s) Topical daily  dextrose 50% Injectable 25 Gram(s) IV Push once  fentaNYL   Infusion... 0.5 MICROgram(s)/kG/Hr (1.75 mL/Hr) IV Continuous <Continuous>  glucagon  Injectable 1 milliGRAM(s) IntraMuscular once  lacosamide IVPB 250 milliGRAM(s) IV Intermittent every 12 hours  meropenem  IVPB 1000 milliGRAM(s) IV Intermittent every 8 hours  norepinephrine Infusion 0.03 MICROgram(s)/kG/Min (3.94 mL/Hr) IV Continuous <Continuous>  pantoprazole  Injectable 40 milliGRAM(s) IV Push daily  Parenteral Nutrition - Adult 1 Each (70 mL/Hr) TPN Continuous <Continuous>  Parenteral Nutrition - Adult 1 Each (70 mL/Hr) TPN Continuous <Continuous>  propofol Infusion 10 MICROgram(s)/kG/Min (4.2 mL/Hr) IV Continuous <Continuous>  sodium chloride 3%  Inhalation 4 milliLiter(s) Inhalation every 6 hours  vasopressin Infusion 0.04 Unit(s)/Min (6 mL/Hr) IV Continuous <Continuous>    MEDICATIONS  (PRN):  dextrose Oral Gel 15 Gram(s) Oral once PRN Blood Glucose LESS THAN 70 milliGRAM(s)/deciliter    VITAL SIGNS:   T(C): 38.2 (05-31-23 @ 08:26), Max: 38.6 (05-31-23 @ 02:00)  HR: 108 (05-31-23 @ 13:30) (76 - 161)  BP: 93/60 (05-31-23 @ 08:26) (93/60 - 93/60)  RR: 24 (05-31-23 @ 13:30) (13 - 24)  SpO2: 100% (05-31-23 @ 13:30) (96% - 100%)  Wt(kg): --    PHYSICAL EXAM:   Intubated and sedated on propofol and fentanyl. Eyes open to name and noxious stimuli. R eye with mild ptosis. Tracks bedside activity intermittently. PERRLA 3mm brisk. Facial grimacing to painful stimuli.     LABS:   CBC Full  -  ( 31 May 2023 12:32 )  WBC Count : 13.11 K/uL  RBC Count : 2.48 M/uL  Hemoglobin : 7.4 g/dL  Hematocrit : 21.7 %  Platelet Count - Automated : 26 K/uL  Mean Cell Volume : 87.5 fl  Mean Cell Hemoglobin : 29.8 pg  Mean Cell Hemoglobin Concentration : 34.1 gm/dL  Auto Neutrophil # : x  Auto Lymphocyte # : x  Auto Monocyte # : x  Auto Eosinophil # : x  Auto Basophil # : x  Auto Neutrophil % : x  Auto Lymphocyte % : x  Auto Monocyte % : x  Auto Eosinophil % : x  Auto Basophil % : x    05-31    134<L>  |  104  |  35<H>  ----------------------------<  109<H>  4.5   |  23  |  0.89    Ca    7.7<L>      31 May 2023 12:33  Phos  5.4     05-31  Mg     2.3     05-31    TPro  4.5<L>  /  Alb  1.9<L>  /  TBili  2.4<H>  /  DBili  1.8<H>  /  AST  80<H>  /  ALT  23  /  AlkPhos  99  05-31    LIVER FUNCTIONS - ( 31 May 2023 12:33 )  Alb: 1.9 g/dL / Pro: 4.5 g/dL / ALK PHOS: 99 U/L / ALT: 23 U/L / AST: 80 U/L / GGT: x           PT/INR - ( 31 May 2023 12:33 )   PT: 16.0 sec;   INR: 1.34     PTT - ( 31 May 2023 12:33 )  PTT:33.3 sec      EEG: EPILEPSY PROGRESS NOTE:   Patient seen and examined at bedside, and had recently returned from the OR for Exploratory Laparotomy, Abdominal washout, and placement of drains. Remains intubated and sedated on Propofol 35mcg/kg/min and fentanyl 1mcg/kg/hr but minimally arousable. Concerns for potential seizure event on 5/30/23 described as R eye twicthing.     REVIEW OF SYSTEMS:   Unobtainable 2/2 intubation and minimally responsive     MEDICATIONS  (STANDING):  acetylcysteine 20%  Inhalation 4 milliLiter(s) Inhalation every 6 hours  artificial  tears Solution 1 Drop(s) Both EYES two times a day  chlorhexidine 2% Cloths 1 Application(s) Topical daily  dextrose 50% Injectable 25 Gram(s) IV Push once  fentaNYL   Infusion... 0.5 MICROgram(s)/kG/Hr (1.75 mL/Hr) IV Continuous <Continuous>  glucagon  Injectable 1 milliGRAM(s) IntraMuscular once  lacosamide IVPB 250 milliGRAM(s) IV Intermittent every 12 hours  meropenem  IVPB 1000 milliGRAM(s) IV Intermittent every 8 hours  norepinephrine Infusion 0.03 MICROgram(s)/kG/Min (3.94 mL/Hr) IV Continuous <Continuous>  pantoprazole  Injectable 40 milliGRAM(s) IV Push daily  Parenteral Nutrition - Adult 1 Each (70 mL/Hr) TPN Continuous <Continuous>  Parenteral Nutrition - Adult 1 Each (70 mL/Hr) TPN Continuous <Continuous>  propofol Infusion 10 MICROgram(s)/kG/Min (4.2 mL/Hr) IV Continuous <Continuous>  sodium chloride 3%  Inhalation 4 milliLiter(s) Inhalation every 6 hours  vasopressin Infusion 0.04 Unit(s)/Min (6 mL/Hr) IV Continuous <Continuous>    MEDICATIONS  (PRN):  dextrose Oral Gel 15 Gram(s) Oral once PRN Blood Glucose LESS THAN 70 milliGRAM(s)/deciliter    VITAL SIGNS:   T(C): 38.2 (05-31-23 @ 08:26), Max: 38.6 (05-31-23 @ 02:00)  HR: 108 (05-31-23 @ 13:30) (76 - 161)  BP: 93/60 (05-31-23 @ 08:26) (93/60 - 93/60)  RR: 24 (05-31-23 @ 13:30) (13 - 24)  SpO2: 100% (05-31-23 @ 13:30) (96% - 100%)  Wt(kg): --    PHYSICAL EXAM:   Intubated and sedated on propofol and fentanyl. Eyes open to name and noxious stimuli. Does not follow commands, but reports of following in AM. R eye with mild ptosis. Tracks bedside activity intermittently. PERRLA 3mm brisk. Facial grimacing to painful stimuli in all limbs, B/L UE and LE w/ 3+ pitting edema. 2+ reflexes in B/L UE and 1+ in B/L LE and toes downgoing.     LABS:   CBC Full  -  ( 31 May 2023 12:32 )  WBC Count : 13.11 K/uL  RBC Count : 2.48 M/uL  Hemoglobin : 7.4 g/dL  Hematocrit : 21.7 %  Platelet Count - Automated : 26 K/uL  Mean Cell Volume : 87.5 fl  Mean Cell Hemoglobin : 29.8 pg  Mean Cell Hemoglobin Concentration : 34.1 gm/dL  Auto Neutrophil # : x  Auto Lymphocyte # : x  Auto Monocyte # : x  Auto Eosinophil # : x  Auto Basophil # : x  Auto Neutrophil % : x  Auto Lymphocyte % : x  Auto Monocyte % : x  Auto Eosinophil % : x  Auto Basophil % : x    05-31    134<L>  |  104  |  35<H>  ----------------------------<  109<H>  4.5   |  23  |  0.89    Ca    7.7<L>      31 May 2023 12:33  Phos  5.4     05-31  Mg     2.3     05-31    TPro  4.5<L>  /  Alb  1.9<L>  /  TBili  2.4<H>  /  DBili  1.8<H>  /  AST  80<H>  /  ALT  23  /  AlkPhos  99  05-31    LIVER FUNCTIONS - ( 31 May 2023 12:33 )  Alb: 1.9 g/dL / Pro: 4.5 g/dL / ALK PHOS: 99 U/L / ALT: 23 U/L / AST: 80 U/L / GGT: x           PT/INR - ( 31 May 2023 12:33 )   PT: 16.0 sec;   INR: 1.34     PTT - ( 31 May 2023 12:33 )  PTT:33.3 sec       EPILEPSY PROGRESS NOTE:   Patient seen and examined at bedside, and had recently returned from the OR for Exploratory Laparotomy, Abdominal washout, and placement of drains. Remains intubated and sedated on Propofol 35mcg/kg/min and fentanyl 1mcg/kg/hr but minimally arousable. Concerns for potential seizure event on 5/30/23 described as R eye twitching     REVIEW OF SYSTEMS:   Unobtainable 2/2 intubation and minimally responsive     MEDICATIONS  (STANDING):  acetylcysteine 20%  Inhalation 4 milliLiter(s) Inhalation every 6 hours  artificial  tears Solution 1 Drop(s) Both EYES two times a day  chlorhexidine 2% Cloths 1 Application(s) Topical daily  dextrose 50% Injectable 25 Gram(s) IV Push once  fentaNYL   Infusion... 0.5 MICROgram(s)/kG/Hr (1.75 mL/Hr) IV Continuous <Continuous>  glucagon  Injectable 1 milliGRAM(s) IntraMuscular once  lacosamide IVPB 250 milliGRAM(s) IV Intermittent every 12 hours  meropenem  IVPB 1000 milliGRAM(s) IV Intermittent every 8 hours  norepinephrine Infusion 0.03 MICROgram(s)/kG/Min (3.94 mL/Hr) IV Continuous <Continuous>  pantoprazole  Injectable 40 milliGRAM(s) IV Push daily  Parenteral Nutrition - Adult 1 Each (70 mL/Hr) TPN Continuous <Continuous>  Parenteral Nutrition - Adult 1 Each (70 mL/Hr) TPN Continuous <Continuous>  propofol Infusion 10 MICROgram(s)/kG/Min (4.2 mL/Hr) IV Continuous <Continuous>  sodium chloride 3%  Inhalation 4 milliLiter(s) Inhalation every 6 hours  vasopressin Infusion 0.04 Unit(s)/Min (6 mL/Hr) IV Continuous <Continuous>    MEDICATIONS  (PRN):  dextrose Oral Gel 15 Gram(s) Oral once PRN Blood Glucose LESS THAN 70 milliGRAM(s)/deciliter    VITAL SIGNS:   T(C): 38.2 (05-31-23 @ 08:26), Max: 38.6 (05-31-23 @ 02:00)  HR: 108 (05-31-23 @ 13:30) (76 - 161)  BP: 93/60 (05-31-23 @ 08:26) (93/60 - 93/60)  RR: 24 (05-31-23 @ 13:30) (13 - 24)  SpO2: 100% (05-31-23 @ 13:30) (96% - 100%)  Wt(kg): --    PHYSICAL EXAM:   Intubated and sedated on propofol and fentanyl. Eyes open to name and noxious stimuli. Does not follow commands, but reports of following in AM. R eye with mild ptosis. Tracks bedside activity intermittently. PERRLA 3mm brisk. Facial grimacing to painful stimuli in all limbs, B/L UE and LE w/ 3+ pitting edema. 2+ reflexes in B/L UE and 1+ in B/L LE and toes downgoing.     LABS:   CBC Full  -  ( 31 May 2023 12:32 )  WBC Count : 13.11 K/uL  RBC Count : 2.48 M/uL  Hemoglobin : 7.4 g/dL  Hematocrit : 21.7 %  Platelet Count - Automated : 26 K/uL  Mean Cell Volume : 87.5 fl  Mean Cell Hemoglobin : 29.8 pg  Mean Cell Hemoglobin Concentration : 34.1 gm/dL  Auto Neutrophil # : x  Auto Lymphocyte # : x  Auto Monocyte # : x  Auto Eosinophil # : x  Auto Basophil # : x  Auto Neutrophil % : x  Auto Lymphocyte % : x  Auto Monocyte % : x  Auto Eosinophil % : x  Auto Basophil % : x    05-31    134<L>  |  104  |  35<H>  ----------------------------<  109<H>  4.5   |  23  |  0.89    Ca    7.7<L>      31 May 2023 12:33  Phos  5.4     05-31  Mg     2.3     05-31    TPro  4.5<L>  /  Alb  1.9<L>  /  TBili  2.4<H>  /  DBili  1.8<H>  /  AST  80<H>  /  ALT  23  /  AlkPhos  99  05-31    LIVER FUNCTIONS - ( 31 May 2023 12:33 )  Alb: 1.9 g/dL / Pro: 4.5 g/dL / ALK PHOS: 99 U/L / ALT: 23 U/L / AST: 80 U/L / GGT: x           PT/INR - ( 31 May 2023 12:33 )   PT: 16.0 sec;   INR: 1.34     PTT - ( 31 May 2023 12:33 )  PTT:33.3 sec

## 2023-05-31 NOTE — PRE-ANESTHESIA EVALUATION ADULT - NSANTHPMHFT_GEN_ALL_CORE
· Assessment    A/p: 53yMale w/ PMHx of HTN, type A aortic dissection s/p Dacron grafts, AV resuspension in 2013, CAD s/p CABG x 1 SVG to RCA (5/2013 with Dr. Medina), seizure disorder, recent admission 3/20-4/14 for replacement of transverse aortic arch, second stage TEVAR and aorto-axillary bypass, AV replacement (bio 23mm), and CABG x 1 (SVG-RCA). Pt found to have endo leak and taken to OR for TEVAR revision on 5/5, Post op course c/b Klebsiella bacteremia (5/15), resp failure requiring intubation on 5/18, Mucus plugging s/p Bronch 5/19 and PEA arrest. Post operatively pt also found to have hemorrhagic pancreatitis with venu-pancreatic abscess possibly 2* to Depakote therefore s/p IR aspiration of peripancreatic fluid collection and paracentesis on 5/22, IR venu-pancreatic abscess drainage and placement of drainage catheter on 5/24. Pt then transferred from CTICU to SICU for further mgmt of hemorrhagic pancreatitis on 5/25. s/p IR placement of 2 new drainage catheters and catheter exchange on 5/26. LUQ drainage 5/30. 53yMale w/ PMHx of HTN, type A aortic dissection s/p Dacron grafts, AV resuspension in 2013, CAD s/p CABG x 1 SVG to RCA (5/2013 with Dr. Medina), seizure disorder, recent admission 3/20-4/14 for replacement of transverse aortic arch, second stage TEVAR and aorto-axillary bypass, AV replacement (bio 23mm), and CABG x 1 (SVG-RCA). Pt found to have endo leak and taken to OR for TEVAR revision on 5/5, Post op course c/b Klebsiella bacteremia (5/15), resp failure requiring intubation on 5/18, Mucus plugging s/p Bronch 5/19 and PEA arrest. Post operatively pt also found to have hemorrhagic pancreatitis with venu-pancreatic abscess possibly 2* to Depakote therefore s/p IR aspiration of peripancreatic fluid collection and paracentesis on 5/22, IR venu-pancreatic abscess drainage and placement of drainage catheter on 5/24. Pt then transferred from CTICU to SICU for further mgmt of hemorrhagic pancreatitis on 5/25. s/p IR placement of 2 new drainage catheters and catheter exchange on 5/26. LUQ drainage 5/30.

## 2023-05-31 NOTE — PROGRESS NOTE ADULT - SUBJECTIVE AND OBJECTIVE BOX
INFECTIOUS DISEASES CONSULT FOLLOW-UP NOTE    INTERVAL HPI/OVERNIGHT EVENTS:    Pt taken to OR this AM for acute abdomen and drop in Hb       ROS:   Constitutional, eyes, ENT, cardiovascular, respiratory, gastrointestinal, genitourinary, integumentary, neurological, psychiatric and heme/lymph are otherwise negative other than noted above       ANTIBIOTICS/RELEVANT:    MEDICATIONS  (STANDING):  Parenteral Nutrition - Adult 1 Each (70 mL/Hr) TPN Continuous <Continuous>    MEDICATIONS  (PRN):        Vital Signs Last 24 Hrs  T(C): 38.2 (31 May 2023 08:26), Max: 38.6 (31 May 2023 02:00)  T(F): 99.6 (31 May 2023 08:00), Max: 101.5 (31 May 2023 02:00)  HR: 119 (31 May 2023 08:30) (76 - 161)  BP: 93/60 (31 May 2023 08:26) (93/60 - 93/60)  BP(mean): 72 (31 May 2023 08:26) (72 - 72)  RR: 18 (31 May 2023 08:30) (13 - 25)  SpO2: 100% (31 May 2023 08:30) (96% - 100%)    Parameters below as of 31 May 2023 08:30  Patient On (Oxygen Delivery Method): ventilator    O2 Concentration (%): 40    05-30-23 @ 07:01  -  05-31-23 @ 07:00  --------------------------------------------------------  IN: 3496.3 mL / OUT: 3642 mL / NET: -145.7 mL    05-31-23 @ 07:01  -  05-31-23 @ 11:23  --------------------------------------------------------  IN: 103.9 mL / OUT: 0 mL / NET: 103.9 mL      PHYSICAL EXAM:  Constitutional: intubated, ill appearing  Ear/Nose/Throat: no oral lesion, no sinus tenderness on percussion	  Neck:  supple  Respiratory: CTA BL  Cardiovascular: S1S2 RRR, no murmurs  Gastrointestinal: distended and tense this AM   Extremities: no e/e/c  Vascular: BL DP and radial pulses 2+         LABS:                        6.2    14.24 )-----------( 35       ( 31 May 2023 07:59 )             18.6     05-31    132<L>  |  102  |  31<H>  ----------------------------<  132<H>  4.6   |  22  |  0.84    Ca    7.3<L>      31 May 2023 07:59  Phos  5.4     05-31  Mg     2.3     05-31    TPro  4.7<L>  /  Alb  2.0<L>  /  TBili  2.0<H>  /  DBili  x   /  AST  26  /  ALT  10  /  AlkPhos  119  05-31    PT/INR - ( 31 May 2023 04:17 )   PT: 16.2 sec;   INR: 1.36          PTT - ( 31 May 2023 04:17 )  PTT:35.8 sec      MICROBIOLOGY:      RADIOLOGY & ADDITIONAL STUDIES:  Reviewed

## 2023-05-31 NOTE — PROGRESS NOTE ADULT - SUBJECTIVE AND OBJECTIVE BOX
Patient brought to the OR this AM for increasing pressor requirements and signs of peritonitis, and is now s/p ex-lap and abdominal washout. Five out of the seven existing IR drains were removed and new surgical drains have been placed. The remaining IR drains are described as follows:    1. 16 Fr teal LUQ peripancreatic abscess "Left Pancreatic CODIE Drain" placed on 5/24:   -58 cc serosanguinous output in 24 hrs. 160 cc in previous 24 hrs.   Flushed easily with 3-5 cc NS without resistance. Dressing c/d/i.     2. 16 Fr teal LUQ hematoma "Left Abdominal Hematoma CODIE Drain" placed on 5/25 and upsized on 5/26:   -260 cc dark sanguinous output in 24 hrs. 360 cc in previous 24 hrs.  Flushed easily with 3-5 cc NS without resistance.  Dressing c/d/i.

## 2023-05-31 NOTE — PROGRESS NOTE ADULT - ASSESSMENT
51 YO Male, HTN aortic dissection previous admission with severe cardiogenic shock and oliguric TREY requiring CVVHD. Presented to the ED with worsening epigastric pain CTA/P revealed continued endoleak hospital course complicated by necrotic pancreatitis      #oligoanuric ATN 2/2 cardiogenic shock  #necrotizing pancreatitis        recommend:  patients urine output starting to  no longer oliguric however still with significant anasarca  c/w CVVHD for UF and clearance   Qb 300ml/min will lower DFR to 1.5L.hour and increase UF to net negative 100ml/hour  q8H BMP while on CVVHD  Check phos daily  maintain MAP > 70 for adequate renal perfusion  strict i's and o's  renally dose abx egfr <15  Will recommend avoiding IV Contrast to prevent further renal insult as pt still oliguric, requiring CVVHD support  BID bladder scan    Malika Garcia D.O  PGY 5 nephrology fellow  [phone]       51 YO Male, HTN aortic dissection previous admission with severe cardiogenic shock and oliguric TREY requiring CVVHD. Presented to the ED with worsening epigastric pain CTA/P revealed continued endoleak hospital course complicated by necrotic pancreatitis      #oligoanuric ATN 2/2 cardiogenic shock  #necrotizing pancreatitis        recommend:  patients urine output starting to  no longer oliguric however still with significant anasarca with c/w CVVHD for volume removal   Qb 300ml/min will lower DFR to 1.5L.hour and increase UF to net negative 100ml/hour  q8H BMP while on CVVHD  Check phos daily  maintain MAP > 70 for adequate renal perfusion  strict i's and o's  renally dose abx egfr <15  Will recommend avoiding IV Contrast to prevent further renal insult as pt still oliguric, requiring CVVHD support  BID bladder scan    Malika Garcia D.O  PGY 5 nephrology fellow  [phone]

## 2023-05-31 NOTE — PROGRESS NOTE ADULT - SUBJECTIVE AND OBJECTIVE BOX
IDENTIFICATION: This is a 54yo Male pt with PMH HTN, type A aortic dissection s/p Dacron grafts, AV resuspension in 2013, CAD s/p CABG x 1 SVG to RCA (5/2013 with Dr. Medina), seizure disorder (last episode on 7/4/22) S/P replacement of transverse aortic arch, second stage thoracic endovascular aortic repair, aorto-axillary bypass, AV replacement (bio 23mm), in APr 2023 and CABG x 1 (SVG-RCA) EF 75% (Ignacio, 3/20) now s/p 2nd state TEVAR on 5/5 for whom surgery was consulted for finding of acute pancreatitis with large peripancreatic/perigastric fluid collections on CTA abdomen 5/8 then acute hemorrhage starting 5/12/23 requiring 11 U pRBC but with negative mesenteric angiogram 5/13/23 for whom hepatobiliary surgery was reconsulted for possible hemorrhagic pancreatitis, now with likely superinfected pancreatic necrosis S/P multiple IR drains (most recent 5/30).     EVENTS:   - Remains intubated/sedated  - H&H dropped last night (hB 6.6) --> given 1 U pRBC. Repeat Hb 7.2.  - IR Cultures grossly positive for Klebsiella but cultures since 5/26 sterile; BCx redrawn 5/29 for fever to 39.0. Febrile overnight to 101.5 at 0200.   - Bilious output around Dobhoff this morning. Dobhoff residual 200.     SUBJECTIVE:   - Sedated    MEDICATIONS  (STANDING):  lipid, fat emulsion (Fish Oil and Plant Based) 20% Infusion 0.7143 Gm/kG/Day (20.8 mL/Hr) IV Continuous <Continuous>  Parenteral Nutrition - Adult 1 Each (70 mL/Hr) TPN Continuous <Continuous>    MEDICATIONS  (PRN):      Vital Signs Last 24 Hrs  T(C): 38.2 (31 May 2023 08:26), Max: 38.6 (31 May 2023 02:00)  T(F): 99.6 (31 May 2023 08:00), Max: 101.5 (31 May 2023 02:00)  HR: 119 (31 May 2023 08:30) (76 - 161)  BP: 93/60 (31 May 2023 08:26) (93/60 - 93/60)  BP(mean): 72 (31 May 2023 08:26) (72 - 72)  RR: 18 (31 May 2023 08:30) (13 - 25)  SpO2: 100% (31 May 2023 08:30) (96% - 100%)    Parameters below as of 31 May 2023 08:30  Patient On (Oxygen Delivery Method): ventilator    O2 Concentration (%): 40      Neurologic: Sedated  CV: Normal rate, regular rhythm  Pulm: Breathing comfortably on MV with equal chest rise.  Abd: Moderately distended; mild reaction to palpation despite sedation  Right ascites drains: thin yellowish fluid  Left ascites drains: thicker brownish-red fluid  Hematoma drain: thin reddish-black fluid  Pancreatic drain: thin reddish-black fluid  : No Cantrell.  Skin: No rashes  Extremities: No edema.  Psychiatric: Sedated      I&O's Detail    30 May 2023 07:01  -  31 May 2023 07:00  --------------------------------------------------------  IN:    Dexmedetomidine: 357.5 mL    Enteral Tube Flush: 70 mL    Fat Emulsion (Fish Oil &amp; Plant Based) 20% Infusion: 249.6 mL    IV PiggyBack: 250 mL    IV PiggyBack: 400 mL    IV PiggyBack: 150 mL    Nepro with Carb Steady: 50 mL    Norepinephrine: 190.8 mL    PRBCs (Packed Red Blood Cells): 300 mL    Propofol: 8.4 mL    TPN (Total Parenteral Nutrition): 1470 mL  Total IN: 3496.3 mL    OUT:    Bulb (mL): 35 mL    Bulb (mL): 105 mL    Drain (mL): 30 mL    Drain (mL): 205 mL    Drain (mL): 48 mL    Drain (mL): 58 mL    Drain (mL): 10 mL    Drain (mL): 260 mL    Incontinent per Condom Catheter (mL): 1 mL    Other (mL): 2404 mL    Voided (mL): 486 mL  Total OUT: 3642 mL    Total NET: -145.7 mL      31 May 2023 07:01  -  31 May 2023 10:57  --------------------------------------------------------  IN:    Dexmedetomidine: 17.5 mL    Norepinephrine: 16.4 mL    TPN (Total Parenteral Nutrition): 70 mL  Total IN: 103.9 mL    OUT:  Total OUT: 0 mL    Total NET: 103.9 mL          LABS:                        6.2    14.24 )-----------( 35       ( 31 May 2023 07:59 )             18.6     05-31    132<L>  |  102  |  31<H>  ----------------------------<  132<H>  4.6   |  22  |  0.84    Ca    7.3<L>      31 May 2023 07:59  Phos  5.4     05-31  Mg     2.3     05-31    TPro  4.7<L>  /  Alb  2.0<L>  /  TBili  2.0<H>  /  DBili  x   /  AST  26  /  ALT  10  /  AlkPhos  119  05-31    PT/INR - ( 31 May 2023 04:17 )   PT: 16.2 sec;   INR: 1.36          PTT - ( 31 May 2023 04:17 )  PTT:35.8 sec

## 2023-05-31 NOTE — BRIEF OPERATIVE NOTE - OPERATION/FINDINGS
Procedure: Exploratory Laparotomy, Abdominal washout, and placement of drains Procedure: Exploratory Laparotomy, Abdominal washout, and placement of drains    Midline incision w/ ascites on entry. Revaluation of the upper abdomen w/ noted clot, evacuated. Ooze noted w/out specific active bleed. Small bowel evaluated from LOT to TI, healthy w/o noted ischemia or perforation. Upper abdomen reevaluated w/ improvement of ooze. Abdomen washed out. Hemostatic agents placed in upper abdomen. Drain placed in RP, LUQ, and pelvis. Abdomen remained open w/ abthera in place

## 2023-05-31 NOTE — PROGRESS NOTE ADULT - ASSESSMENT
This is a 52yo Male PMH HTN, type A aortic dissection s/p Dacron grafts, AV resuspension in 2013, CAD s/p CABG x 1 SVG to RCA (5/2013 with Dr. Medina), seizure disorder (last episode on 7/4/22) S/P replacement of transverse aortic arch, second stage thoracic endovascular aortic repair, aorto-axillary bypass, AV replacement (bio 23mm), in APr 2023 and CABG x 1 (SVG-RCA) EF 75% (Ignacio, 3/20) now s/p 2nd state TEVAR on 5/5 for whom surgery was consulted for finding of acute pancreatitis with large peripancreatic/perigastric fluid collections on CTA abdomen 5/8 then acute hemorrhage starting 5/12/23 requiring 11 U pRBC but with negative mesenteric angiogram 5/13/23 for whom hepatobiliary surgery was reconsulted for possible hemorrhagic pancreatitis, now with 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        NEURO: Precedex gtt. Oxycodone 5/10 PRN. Hx seizures: s/p epilepsy consult, Cont vimpat. Depakote weaned off due to concerns for drug-related hemorrhagic pancreatitis.   HEENT: Artificial tears  CV: Septic shock: On Levophed for MAP> 65. Echo from 5/19 hyperdynamic LV, SHAJI with LVOTO (gradient 26), normal RV, mild MR, no pulm htn. Cont ASA 81mg.    PULM: intubated 5/13 on AC 40/500/22/8, ARDS now resolved - off NO, s/p multiple Mucus plug/ Lung collapse s/p bronch x3 (most recently on 5/26) most likely from outward obstruction: Cont rotating pt around the clock. Cont Duonebs, & Chest PT q4. CPAP trial 40%/8/8 daily. CT chest 5/29 - with mucus plugging of L mainstem bronchus likely 2/2 extrinisic compression. Bronchoscopy today as per SICU  GI/FEN: NPO on TPN/Lipids,  Holding TF Nepro for potential IR today, restart post procedure. protonix BID, Hemorrhagic pancreatitis: s/p Multiple Drain placements and paracentisis by IR team.  FOBT+ 5/29. Constipation: resolved, now diarrhea: Cont Rectal tube. For OR today for exploratory laparotomy for diagnosis, drainage, and hemostasis control.  : TREY on CVVHD. Will attempt to remove fluid today for goal 500-1L negative if BP allows. Nephro following. Cantrell d/c'ed 5/26 - Continue bladder scan daily for poss straight cath.   HEME: Acute blood loss anemia on 5/12 requiring 11units of PRBC. IR negative for mesenteric extrav. s/p 3u pPRBC since transfer to SICU. Plt 50 this AM - HIT panel  negative 3/26.  ENDO: mISS  ID: Chloe (5/21-) Caspo (5/23-) ID following. Kleb bacteremia from 5/15 & 5/17, DC: Ampicillin ( 5/21- 5/27) subsequent bld cx negative. PNA w/ bronch cx kleb, enterobacter (zosyn, erta resistant), E faecalis, strep angionosus from 5/19, pancreatic abscess cx pan-sensitive kleb 5/22.   PPX: SCDs, SQH.   LINES: R Ax kalpana(5/12-) RIJ HD cath (5/22--), LIJ TLC (5/23--) Dc:  RIJ TLC (5/22-5/27),   WOUNDS/DRAINS: left venu-pancreatic abscess CODIE drain x 2, ascites CODIE x 2  PT: PT/OR ordered on 5/28  Dispo: SICU level of care at this time. For OR Today.

## 2023-05-31 NOTE — PROGRESS NOTE ADULT - ATTENDING COMMENTS
s/p AVR, TEVAR replacement, CAD, pancreatitis, kelbsiella sepstic shock, AHRF, ATN  physical as above  today hypotensive with RTOR to control bleeding  continue meropenem, ampicillin  continue PRBC, FFP, platelets aiming toward 100K  trial of tranexamic acid for bleeding  continue CVVH  rest as above

## 2023-05-31 NOTE — PROGRESS NOTE ADULT - ATTENDING COMMENTS
Agree with above.  Continue meropenem.  Given clinical decompensation can restart Caspofungin 50 mg IV daily and can add Vancomycin (dose based on renal replacement therapy). Follow up cultures

## 2023-05-31 NOTE — PROGRESS NOTE ADULT - ASSESSMENT
A/p: 53yMale w/ PMHx of HTN, type A aortic dissection s/p Dacron grafts, AV resuspension in 2013, CAD s/p CABG x 1 SVG to RCA (5/2013 with Dr. Medina), seizure disorder, recent admission 3/20-4/14 for replacement of transverse aortic arch, second stage TEVAR and aorto-axillary bypass, AV replacement (bio 23mm), and CABG x 1 (SVG-RCA). Pt found to have endo leak and taken to OR for TEVAR revision on 5/5, Post op course c/b Klebsiella bacteremia (5/15), resp failure requiring intubation on 5/18, Mucus plugging s/p Bronch 5/19 and PEA arrest. Post operatively pt also found to have hemorrhagic pancreatitis with venu-pancreatic abscess possibly 2* to Depakote therefore s/p IR aspiration of peripancreatic fluid collection and paracentesis on 5/22, IR venu-pancreatic abscess drainage and placement of drainage catheter on 5/24. Pt then transferred from CTICU to SICU for further mgmt of hemorrhagic pancreatitis on 5/25. s/p IR placement of 2 new drainage catheters and catheter exchange on 5/26. LUQ drainage 5/30.    NEURO: Precedex gtt. Dilaudid 1 standing, dilaudid 0.5/1 PRN. Hx seizures: epilepsy recs appreciated, Cont vimpat. Depakote weaned off due to concerns for drug-related hemorrhagic pancreatitis.   HEENT: Artificial tears  CV: s/p PEA arrest on 5/24 night, Septic shock: On Levophed for MAP> 65. Echo from 5/19 hyperdynamic LV, SHAJI with LVOTO (gradient 26), normal RV, mild MR, no pulm htn. Cont ASA 81mg.    PULM: intubated 5/13 on AC 40/500/22/8, ARDS now resolved - off NO, s/p multiple Mucus plug/ Lung collapse s/p bronch x3 (most recently on 5/26) most likely from outward obstruction: Cont rotating pt around the clock. Cont Duonebs, Mucomyst, 3% saline & Chest PT q4. CPAP trial 40%/5/5 daily  GI/FEN: NPO on TPN/Lipids,Trickle TF Nepro at 10 - held this AM due to small volume bilious emesis. Protonix BID, Hemorrhagic pancreatitis: s/p Multiple Drain placements and paracentisis by IR team.  FOBT+ 5/29. Constipation: resolved, now diarrhea: Cont Rectal tube,  : TREY on CVVHD - continue with net negative given increase levophed requirements. Nephro following. Cantrell d/c'ed 5/26 - Continue bladder scan daily for poss straight cath.   HEME: Acute blood loss anemia on 5/12 requiring 11units of PRBC. IR negative for mesenteric extrav. s/p 5u pPRBC since transfer to SICU - most recent 1uPRBC O/N for Hb 6.6. Plt 37 this AM - HSQ held.   ENDO: mISS  ID: Chloe (5/21-), ID following. Kleb bacteremia from 5/15 & 5/17, DC: Caspo (5/23-5/30), Ampicillin ( 5/21- 5/27) subsequent bld cx negative. PNA w/ bronch cx kleb, enterobacter (zosyn, erta resistant), E faecalis, strep angionosus from 5/19, pancreatic abscess cx pan-sensitive kleb 5/22.   PPX: SCDs, SQH.   LINES: R Ax kalpana(5/12-) RIJ HD cath (5/22--), LIJ TLC (5/23--) // DC: RIJ TLC (5/22-5/27),   WOUNDS/DRAINS: left venu-pancreatic abscess CODIE drain x 2, ascites CODIE x 2  PT: PT/OR ordered on 5/28  Dispo: SICU

## 2023-05-31 NOTE — PROGRESS NOTE ADULT - ATTENDING COMMENTS
I agree with the fellow's findings and plans as written above with the following additions/amendments:    Seen and examined at bedside on CVVHD, will restart after procedure. Further recs as above, continue CVVHD as above

## 2023-06-01 ENCOUNTER — TRANSCRIPTION ENCOUNTER (OUTPATIENT)
Age: 53
End: 2023-06-01

## 2023-06-01 LAB
-  AMPICILLIN/SULBACTAM: SIGNIFICANT CHANGE UP
-  AMPICILLIN: SIGNIFICANT CHANGE UP
-  CEFAZOLIN: SIGNIFICANT CHANGE UP
-  CEFTRIAXONE: SIGNIFICANT CHANGE UP
-  CIPROFLOXACIN: SIGNIFICANT CHANGE UP
-  ERTAPENEM: SIGNIFICANT CHANGE UP
-  GENTAMICIN: SIGNIFICANT CHANGE UP
-  PIPERACILLIN/TAZOBACTAM: SIGNIFICANT CHANGE UP
-  TOBRAMYCIN: SIGNIFICANT CHANGE UP
-  TRIMETHOPRIM/SULFAMETHOXAZOLE: SIGNIFICANT CHANGE UP
ALBUMIN SERPL ELPH-MCNC: 1.9 G/DL — LOW (ref 3.3–5)
ALBUMIN SERPL ELPH-MCNC: 2.4 G/DL — LOW (ref 3.3–5)
ALP SERPL-CCNC: 120 U/L — SIGNIFICANT CHANGE UP (ref 40–120)
ALP SERPL-CCNC: 80 U/L — SIGNIFICANT CHANGE UP (ref 40–120)
ALT FLD-CCNC: 18 U/L — SIGNIFICANT CHANGE UP (ref 10–45)
ALT FLD-CCNC: 19 U/L — SIGNIFICANT CHANGE UP (ref 10–45)
ANION GAP SERPL CALC-SCNC: 10 MMOL/L — SIGNIFICANT CHANGE UP (ref 5–17)
ANION GAP SERPL CALC-SCNC: 8 MMOL/L — SIGNIFICANT CHANGE UP (ref 5–17)
ANION GAP SERPL CALC-SCNC: 9 MMOL/L — SIGNIFICANT CHANGE UP (ref 5–17)
ANION GAP SERPL CALC-SCNC: 9 MMOL/L — SIGNIFICANT CHANGE UP (ref 5–17)
APTT BLD: 31.3 SEC — SIGNIFICANT CHANGE UP (ref 27.5–35.5)
APTT BLD: 34 SEC — SIGNIFICANT CHANGE UP (ref 27.5–35.5)
APTT BLD: 35.7 SEC — HIGH (ref 27.5–35.5)
AST SERPL-CCNC: 32 U/L — SIGNIFICANT CHANGE UP (ref 10–40)
AST SERPL-CCNC: 37 U/L — SIGNIFICANT CHANGE UP (ref 10–40)
BASE EXCESS BLDA CALC-SCNC: -1.1 MMOL/L — SIGNIFICANT CHANGE UP (ref -2–3)
BILIRUB DIRECT SERPL-MCNC: 1 MG/DL — HIGH (ref 0–0.3)
BILIRUB INDIRECT FLD-MCNC: 0.4 MG/DL — SIGNIFICANT CHANGE UP (ref 0.2–1)
BILIRUB SERPL-MCNC: 1.4 MG/DL — HIGH (ref 0.2–1.2)
BILIRUB SERPL-MCNC: 1.7 MG/DL — HIGH (ref 0.2–1.2)
BUN SERPL-MCNC: 33 MG/DL — HIGH (ref 7–23)
BUN SERPL-MCNC: 34 MG/DL — HIGH (ref 7–23)
BUN SERPL-MCNC: 34 MG/DL — HIGH (ref 7–23)
BUN SERPL-MCNC: 36 MG/DL — HIGH (ref 7–23)
CALCIUM SERPL-MCNC: 7.2 MG/DL — LOW (ref 8.4–10.5)
CALCIUM SERPL-MCNC: 7.5 MG/DL — LOW (ref 8.4–10.5)
CALCIUM SERPL-MCNC: 7.6 MG/DL — LOW (ref 8.4–10.5)
CALCIUM SERPL-MCNC: 8 MG/DL — LOW (ref 8.4–10.5)
CHLORIDE SERPL-SCNC: 101 MMOL/L — SIGNIFICANT CHANGE UP (ref 96–108)
CHLORIDE SERPL-SCNC: 102 MMOL/L — SIGNIFICANT CHANGE UP (ref 96–108)
CHLORIDE SERPL-SCNC: 103 MMOL/L — SIGNIFICANT CHANGE UP (ref 96–108)
CHLORIDE SERPL-SCNC: 104 MMOL/L — SIGNIFICANT CHANGE UP (ref 96–108)
CO2 BLDA-SCNC: 24 MMOL/L — SIGNIFICANT CHANGE UP (ref 19–24)
CO2 SERPL-SCNC: 22 MMOL/L — SIGNIFICANT CHANGE UP (ref 22–31)
CO2 SERPL-SCNC: 23 MMOL/L — SIGNIFICANT CHANGE UP (ref 22–31)
CO2 SERPL-SCNC: 23 MMOL/L — SIGNIFICANT CHANGE UP (ref 22–31)
CO2 SERPL-SCNC: 24 MMOL/L — SIGNIFICANT CHANGE UP (ref 22–31)
CREAT SERPL-MCNC: 0.87 MG/DL — SIGNIFICANT CHANGE UP (ref 0.5–1.3)
CREAT SERPL-MCNC: 0.9 MG/DL — SIGNIFICANT CHANGE UP (ref 0.5–1.3)
CREAT SERPL-MCNC: 0.91 MG/DL — SIGNIFICANT CHANGE UP (ref 0.5–1.3)
CREAT SERPL-MCNC: 1.04 MG/DL — SIGNIFICANT CHANGE UP (ref 0.5–1.3)
CULTURE RESULTS: SIGNIFICANT CHANGE UP
EGFR: 100 ML/MIN/1.73M2 — SIGNIFICANT CHANGE UP
EGFR: 101 ML/MIN/1.73M2 — SIGNIFICANT CHANGE UP
EGFR: 103 ML/MIN/1.73M2 — SIGNIFICANT CHANGE UP
EGFR: 85 ML/MIN/1.73M2 — SIGNIFICANT CHANGE UP
FIBRINOGEN PPP-MCNC: 270 MG/DL — SIGNIFICANT CHANGE UP (ref 200–445)
GAS PNL BLDA: SIGNIFICANT CHANGE UP
GLUCOSE SERPL-MCNC: 105 MG/DL — HIGH (ref 70–99)
GLUCOSE SERPL-MCNC: 113 MG/DL — HIGH (ref 70–99)
GLUCOSE SERPL-MCNC: 176 MG/DL — HIGH (ref 70–99)
GLUCOSE SERPL-MCNC: 90 MG/DL — SIGNIFICANT CHANGE UP (ref 70–99)
HCO3 BLDA-SCNC: 23 MMOL/L — SIGNIFICANT CHANGE UP (ref 21–28)
HCT VFR BLD CALC: 21.3 % — LOW (ref 39–50)
HCT VFR BLD CALC: 24.7 % — LOW (ref 39–50)
HCT VFR BLD CALC: 26.9 % — LOW (ref 39–50)
HGB BLD-MCNC: 7.3 G/DL — LOW (ref 13–17)
HGB BLD-MCNC: 8.6 G/DL — LOW (ref 13–17)
HGB BLD-MCNC: 9.2 G/DL — LOW (ref 13–17)
HOROWITZ INDEX BLDA+IHG-RTO: 40 — SIGNIFICANT CHANGE UP
INR BLD: 1.2 — HIGH (ref 0.88–1.16)
INR BLD: 1.27 — HIGH (ref 0.88–1.16)
INR BLD: 1.27 — HIGH (ref 0.88–1.16)
MAGNESIUM SERPL-MCNC: 2 MG/DL — SIGNIFICANT CHANGE UP (ref 1.6–2.6)
MAGNESIUM SERPL-MCNC: 2.1 MG/DL — SIGNIFICANT CHANGE UP (ref 1.6–2.6)
MAGNESIUM SERPL-MCNC: 2.2 MG/DL — SIGNIFICANT CHANGE UP (ref 1.6–2.6)
MAGNESIUM SERPL-MCNC: 2.2 MG/DL — SIGNIFICANT CHANGE UP (ref 1.6–2.6)
MCHC RBC-ENTMCNC: 28.8 PG — SIGNIFICANT CHANGE UP (ref 27–34)
MCHC RBC-ENTMCNC: 29.1 PG — SIGNIFICANT CHANGE UP (ref 27–34)
MCHC RBC-ENTMCNC: 29.5 PG — SIGNIFICANT CHANGE UP (ref 27–34)
MCHC RBC-ENTMCNC: 34.2 GM/DL — SIGNIFICANT CHANGE UP (ref 32–36)
MCHC RBC-ENTMCNC: 34.3 GM/DL — SIGNIFICANT CHANGE UP (ref 32–36)
MCHC RBC-ENTMCNC: 34.8 GM/DL — SIGNIFICANT CHANGE UP (ref 32–36)
MCV RBC AUTO: 84.3 FL — SIGNIFICANT CHANGE UP (ref 80–100)
MCV RBC AUTO: 84.6 FL — SIGNIFICANT CHANGE UP (ref 80–100)
MCV RBC AUTO: 84.9 FL — SIGNIFICANT CHANGE UP (ref 80–100)
METHOD TYPE: SIGNIFICANT CHANGE UP
NRBC # BLD: 0 /100 WBCS — SIGNIFICANT CHANGE UP (ref 0–0)
ORGANISM # SPEC MICROSCOPIC CNT: SIGNIFICANT CHANGE UP
ORGANISM # SPEC MICROSCOPIC CNT: SIGNIFICANT CHANGE UP
PCO2 BLDA: 34 MMHG — LOW (ref 35–48)
PH BLDA: 7.43 — SIGNIFICANT CHANGE UP (ref 7.35–7.45)
PHOSPHATE SERPL-MCNC: 4.1 MG/DL — SIGNIFICANT CHANGE UP (ref 2.5–4.5)
PHOSPHATE SERPL-MCNC: 4.3 MG/DL — SIGNIFICANT CHANGE UP (ref 2.5–4.5)
PHOSPHATE SERPL-MCNC: 4.6 MG/DL — HIGH (ref 2.5–4.5)
PLATELET # BLD AUTO: 52 K/UL — LOW (ref 150–400)
PLATELET # BLD AUTO: 60 K/UL — LOW (ref 150–400)
PLATELET # BLD AUTO: 92 K/UL — LOW (ref 150–400)
PO2 BLDA: 178 MMHG — HIGH (ref 83–108)
POTASSIUM SERPL-MCNC: 3.6 MMOL/L — SIGNIFICANT CHANGE UP (ref 3.5–5.3)
POTASSIUM SERPL-MCNC: 3.6 MMOL/L — SIGNIFICANT CHANGE UP (ref 3.5–5.3)
POTASSIUM SERPL-MCNC: 3.8 MMOL/L — SIGNIFICANT CHANGE UP (ref 3.5–5.3)
POTASSIUM SERPL-MCNC: 4.2 MMOL/L — SIGNIFICANT CHANGE UP (ref 3.5–5.3)
POTASSIUM SERPL-SCNC: 3.6 MMOL/L — SIGNIFICANT CHANGE UP (ref 3.5–5.3)
POTASSIUM SERPL-SCNC: 3.6 MMOL/L — SIGNIFICANT CHANGE UP (ref 3.5–5.3)
POTASSIUM SERPL-SCNC: 3.8 MMOL/L — SIGNIFICANT CHANGE UP (ref 3.5–5.3)
POTASSIUM SERPL-SCNC: 4.2 MMOL/L — SIGNIFICANT CHANGE UP (ref 3.5–5.3)
PROT SERPL-MCNC: 4.2 G/DL — LOW (ref 6–8.3)
PROT SERPL-MCNC: 4.9 G/DL — LOW (ref 6–8.3)
PROTHROM AB SERPL-ACNC: 14.3 SEC — HIGH (ref 10.5–13.4)
PROTHROM AB SERPL-ACNC: 15.1 SEC — HIGH (ref 10.5–13.4)
PROTHROM AB SERPL-ACNC: 15.2 SEC — HIGH (ref 10.5–13.4)
RBC # BLD: 2.51 M/UL — LOW (ref 4.2–5.8)
RBC # BLD: 2.92 M/UL — LOW (ref 4.2–5.8)
RBC # BLD: 3.19 M/UL — LOW (ref 4.2–5.8)
RBC # FLD: 15.5 % — HIGH (ref 10.3–14.5)
RBC # FLD: 15.5 % — HIGH (ref 10.3–14.5)
RBC # FLD: 15.9 % — HIGH (ref 10.3–14.5)
SAO2 % BLDA: 99.7 % — HIGH (ref 94–98)
SODIUM SERPL-SCNC: 133 MMOL/L — LOW (ref 135–145)
SODIUM SERPL-SCNC: 134 MMOL/L — LOW (ref 135–145)
SODIUM SERPL-SCNC: 135 MMOL/L — SIGNIFICANT CHANGE UP (ref 135–145)
SODIUM SERPL-SCNC: 136 MMOL/L — SIGNIFICANT CHANGE UP (ref 135–145)
SPECIMEN SOURCE: SIGNIFICANT CHANGE UP
TRIGL SERPL-MCNC: 140 MG/DL — SIGNIFICANT CHANGE UP
WBC # BLD: 10.62 K/UL — HIGH (ref 3.8–10.5)
WBC # BLD: 11.57 K/UL — HIGH (ref 3.8–10.5)
WBC # BLD: 12.77 K/UL — HIGH (ref 3.8–10.5)
WBC # FLD AUTO: 10.62 K/UL — HIGH (ref 3.8–10.5)
WBC # FLD AUTO: 11.57 K/UL — HIGH (ref 3.8–10.5)
WBC # FLD AUTO: 12.77 K/UL — HIGH (ref 3.8–10.5)

## 2023-06-01 PROCEDURE — 36010 PLACE CATHETER IN VEIN: CPT

## 2023-06-01 PROCEDURE — 90945 DIALYSIS ONE EVALUATION: CPT

## 2023-06-01 PROCEDURE — 99232 SBSQ HOSP IP/OBS MODERATE 35: CPT

## 2023-06-01 PROCEDURE — 43235 EGD DIAGNOSTIC BRUSH WASH: CPT

## 2023-06-01 PROCEDURE — 76937 US GUIDE VASCULAR ACCESS: CPT | Mod: 26

## 2023-06-01 PROCEDURE — 36000 PLACE NEEDLE IN VEIN: CPT

## 2023-06-01 PROCEDURE — 99233 SBSQ HOSP IP/OBS HIGH 50: CPT | Mod: GC

## 2023-06-01 PROCEDURE — 71045 X-RAY EXAM CHEST 1 VIEW: CPT | Mod: 26

## 2023-06-01 PROCEDURE — 44300 OPEN BOWEL TO SKIN: CPT | Mod: 78

## 2023-06-01 RX ORDER — CHLORHEXIDINE GLUCONATE 213 G/1000ML
1 SOLUTION TOPICAL
Refills: 0 | Status: DISCONTINUED | OUTPATIENT
Start: 2023-06-01 | End: 2023-06-06

## 2023-06-01 RX ORDER — VASOPRESSIN 20 [USP'U]/ML
0.02 INJECTION INTRAVENOUS
Qty: 40 | Refills: 0 | Status: DISCONTINUED | OUTPATIENT
Start: 2023-06-01 | End: 2023-06-02

## 2023-06-01 RX ORDER — ELECTROLYTE SOLUTION,INJ
1 VIAL (ML) INTRAVENOUS
Refills: 0 | Status: DISCONTINUED | OUTPATIENT
Start: 2023-06-01 | End: 2023-06-01

## 2023-06-01 RX ORDER — SODIUM CHLORIDE 9 MG/ML
10 INJECTION INTRAMUSCULAR; INTRAVENOUS; SUBCUTANEOUS
Refills: 0 | Status: DISCONTINUED | OUTPATIENT
Start: 2023-06-01 | End: 2023-07-19

## 2023-06-01 RX ADMIN — CHLORHEXIDINE GLUCONATE 1 APPLICATION(S): 213 SOLUTION TOPICAL at 17:09

## 2023-06-01 RX ADMIN — Medication 1 EACH: at 17:25

## 2023-06-01 RX ADMIN — CISATRACURIUM BESYLATE 12.6 MICROGRAM(S)/KG/MIN: 2 INJECTION INTRAVENOUS at 04:47

## 2023-06-01 RX ADMIN — TRANEXAMIC ACID 220 MILLIGRAM(S): 100 INJECTION, SOLUTION INTRAVENOUS at 06:25

## 2023-06-01 RX ADMIN — CHLORHEXIDINE GLUCONATE 15 MILLILITER(S): 213 SOLUTION TOPICAL at 05:20

## 2023-06-01 RX ADMIN — PANTOPRAZOLE SODIUM 40 MILLIGRAM(S): 20 TABLET, DELAYED RELEASE ORAL at 11:00

## 2023-06-01 RX ADMIN — Medication 1 DROP(S): at 17:32

## 2023-06-01 RX ADMIN — MEROPENEM 100 MILLIGRAM(S): 1 INJECTION INTRAVENOUS at 05:20

## 2023-06-01 RX ADMIN — SODIUM CHLORIDE 4 MILLILITER(S): 9 INJECTION INTRAMUSCULAR; INTRAVENOUS; SUBCUTANEOUS at 00:01

## 2023-06-01 RX ADMIN — Medication 4 MILLILITER(S): at 04:56

## 2023-06-01 RX ADMIN — Medication 4 MILLILITER(S): at 20:59

## 2023-06-01 RX ADMIN — SODIUM CHLORIDE 4 MILLILITER(S): 9 INJECTION INTRAMUSCULAR; INTRAVENOUS; SUBCUTANEOUS at 20:59

## 2023-06-01 RX ADMIN — Medication 4 MILLILITER(S): at 00:01

## 2023-06-01 RX ADMIN — CHLORHEXIDINE GLUCONATE 15 MILLILITER(S): 213 SOLUTION TOPICAL at 17:27

## 2023-06-01 RX ADMIN — LACOSAMIDE 150 MILLIGRAM(S): 50 TABLET ORAL at 18:06

## 2023-06-01 RX ADMIN — SODIUM CHLORIDE 4 MILLILITER(S): 9 INJECTION INTRAMUSCULAR; INTRAVENOUS; SUBCUTANEOUS at 04:57

## 2023-06-01 RX ADMIN — SODIUM CHLORIDE 4 MILLILITER(S): 9 INJECTION INTRAMUSCULAR; INTRAVENOUS; SUBCUTANEOUS at 16:50

## 2023-06-01 RX ADMIN — Medication 1 DROP(S): at 05:22

## 2023-06-01 RX ADMIN — PROPOFOL 4.2 MICROGRAM(S)/KG/MIN: 10 INJECTION, EMULSION INTRAVENOUS at 04:47

## 2023-06-01 RX ADMIN — Medication 1 APPLICATION(S): at 17:32

## 2023-06-01 RX ADMIN — Medication 4 MILLILITER(S): at 16:50

## 2023-06-01 RX ADMIN — PROPOFOL 4.2 MICROGRAM(S)/KG/MIN: 10 INJECTION, EMULSION INTRAVENOUS at 23:21

## 2023-06-01 RX ADMIN — LACOSAMIDE 150 MILLIGRAM(S): 50 TABLET ORAL at 07:11

## 2023-06-01 RX ADMIN — MEROPENEM 100 MILLIGRAM(S): 1 INJECTION INTRAVENOUS at 21:52

## 2023-06-01 RX ADMIN — PROPOFOL 4.2 MICROGRAM(S)/KG/MIN: 10 INJECTION, EMULSION INTRAVENOUS at 17:25

## 2023-06-01 NOTE — PROGRESS NOTE ADULT - SUBJECTIVE AND OBJECTIVE BOX
Patient is a 53y Male seen and evaluated at bedside on CVVHD- s/p ex-lap yesterday receieved 3 units PRBCs and 1 unit platelet remains intubated sedated on levophed/vasopressin uop 270cc/24 hours K 3.6 bicarb 23 phos 4.1       Meds:    acetylcysteine 20%  Inhalation 4 every 6 hours  artificial  tears Solution 1 two times a day  chlorhexidine 0.12% Liquid 15 every 12 hours  chlorhexidine 2% Cloths 1 daily  cisatracurium Infusion 3 <Continuous>  CRRT Treatment  <Continuous>  dextrose 50% Injectable 25 once  dextrose Oral Gel 15 once PRN  fentaNYL   Infusion... 0.5 <Continuous>  glucagon  Injectable 1 once  lacosamide IVPB 250 every 12 hours  meropenem  IVPB 1000 every 8 hours  norepinephrine Infusion 0.05 <Continuous>  pantoprazole  Injectable 40 daily  Parenteral Nutrition - Adult 1 <Continuous>  petrolatum Ophthalmic Ointment 1 daily  propofol Infusion 10 <Continuous>  PureFlow Dialysate RFP-400 (K 2 / Ca 3) 5000 <Continuous>  sodium chloride 3%  Inhalation 4 every 6 hours  tranexamic acid IVPB 1000 every 12 hours  vasopressin Infusion 0.04 <Continuous>      T(C): , Max: 38.2 (05-31-23 @ 08:26)  T(F): , Max: 100.6 (05-31-23 @ 17:55)  HR: 93 (06-01-23 @ 08:00)  BP: 93/60 (05-31-23 @ 08:26)  BP(mean): 72 (05-31-23 @ 08:26)  RR: 18 (06-01-23 @ 08:00)  SpO2: 100% (06-01-23 @ 08:00)  Wt(kg): --    05-31 @ 07:01  -  06-01 @ 07:00  --------------------------------------------------------  IN: 5014.7 mL / OUT: 4694 mL / NET: 320.7 mL    06-01 @ 07:01  -  06-01 @ 08:24  --------------------------------------------------------  IN: 111 mL / OUT: 0 mL / NET: 111 mL      Height (cm): 182.9 (05-31 @ 08:26)  Weight (kg): 70 (05-31 @ 08:26)  BMI (kg/m2): 20.9 (05-31 @ 08:26)  BSA (m2): 1.91 (05-31 @ 08:26)    Review of Systems:  unable to participate       PHYSICAL EXAM:  GENERAL: intubated; sedated 40% fi02  CHEST/LUNG: mechanical breath sounds BL  HEART: normal S1S2, RRR  ABDOMEN: Soft, Nontender, +BS, No flank tenderness bilateral  EXTREMITIES:+ edema BL LE  ACCESS: L subclavian Hd cath (placed 5/31)      LABS:                        8.6    11.57 )-----------( 52       ( 01 Jun 2023 05:30 )             24.7     06-01    135  |  104  |  34<H>  ----------------------------<  113<H>  3.6   |  23  |  0.87    Ca    7.2<L>      01 Jun 2023 05:30  Phos  4.1     06-01  Mg     2.2     06-01    TPro  4.2<L>  /  Alb  1.9<L>  /  TBili  1.4<H>  /  DBili  1.0<H>  /  AST  32  /  ALT  19  /  AlkPhos  80  06-01      PT/INR - ( 01 Jun 2023 05:30 )   PT: 15.2 sec;   INR: 1.27          PTT - ( 01 Jun 2023 05:30 )  PTT:35.7 sec          RADIOLOGY & ADDITIONAL STUDIES:      Phosphorus Level, Serum: 4.1 mg/dL (06-01-23 @ 05:30)  Hemoglobin: 8.6 g/dL (06-01-23 @ 05:30)  Hemoglobin: 7.3 g/dL (06-01-23 @ 03:00)  Phosphorus Level, Serum: 4.3 mg/dL (06-01-23 @ 03:00)    Albumin, Serum: 1.9 g/dL (06-01-23 @ 05:30)  Albumin, Serum: 1.9 g/dL (05-31-23 @ 12:33)      CRRT Treatment:   Modality: CVVHD, Filter: NxStage CAR-505, Target Blood Flow: 300 mL/Min  Target Fluid Balance: Titrate to net NEGATIVE fluid balance, 100 mL/Hr (05-31-23 @ 15:29) [Active]       Patient is a 53y Male seen and evaluated at bedside on CVVHD- s/p ex-lap yesterday receieved 3 units PRBCs and 1 unit platelet remains intubated sedated on levophed/vasopressin uop 270cc/24 hours K 3.6 bicarb 23 phos 4.1 patient going back to the OR this AM for another washout       Meds:    acetylcysteine 20%  Inhalation 4 every 6 hours  artificial  tears Solution 1 two times a day  chlorhexidine 0.12% Liquid 15 every 12 hours  chlorhexidine 2% Cloths 1 daily  cisatracurium Infusion 3 <Continuous>  CRRT Treatment  <Continuous>  dextrose 50% Injectable 25 once  dextrose Oral Gel 15 once PRN  fentaNYL   Infusion... 0.5 <Continuous>  glucagon  Injectable 1 once  lacosamide IVPB 250 every 12 hours  meropenem  IVPB 1000 every 8 hours  norepinephrine Infusion 0.05 <Continuous>  pantoprazole  Injectable 40 daily  Parenteral Nutrition - Adult 1 <Continuous>  petrolatum Ophthalmic Ointment 1 daily  propofol Infusion 10 <Continuous>  PureFlow Dialysate RFP-400 (K 2 / Ca 3) 5000 <Continuous>  sodium chloride 3%  Inhalation 4 every 6 hours  tranexamic acid IVPB 1000 every 12 hours  vasopressin Infusion 0.04 <Continuous>      T(C): , Max: 38.2 (05-31-23 @ 08:26)  T(F): , Max: 100.6 (05-31-23 @ 17:55)  HR: 93 (06-01-23 @ 08:00)  BP: 93/60 (05-31-23 @ 08:26)  BP(mean): 72 (05-31-23 @ 08:26)  RR: 18 (06-01-23 @ 08:00)  SpO2: 100% (06-01-23 @ 08:00)  Wt(kg): --    05-31 @ 07:01  -  06-01 @ 07:00  --------------------------------------------------------  IN: 5014.7 mL / OUT: 4694 mL / NET: 320.7 mL    06-01 @ 07:01  -  06-01 @ 08:24  --------------------------------------------------------  IN: 111 mL / OUT: 0 mL / NET: 111 mL      Height (cm): 182.9 (05-31 @ 08:26)  Weight (kg): 70 (05-31 @ 08:26)  BMI (kg/m2): 20.9 (05-31 @ 08:26)  BSA (m2): 1.91 (05-31 @ 08:26)    Review of Systems:  unable to participate       PHYSICAL EXAM:  GENERAL: intubated; sedated 40% fi02  CHEST/LUNG: mechanical breath sounds BL  HEART: normal S1S2, RRR  ABDOMEN: Soft, Nontender, +BS, No flank tenderness bilateral  EXTREMITIES:+ edema BL LE  ACCESS: L subclavian Hd cath (placed 5/31)      LABS:                        8.6    11.57 )-----------( 52       ( 01 Jun 2023 05:30 )             24.7     06-01    135  |  104  |  34<H>  ----------------------------<  113<H>  3.6   |  23  |  0.87    Ca    7.2<L>      01 Jun 2023 05:30  Phos  4.1     06-01  Mg     2.2     06-01    TPro  4.2<L>  /  Alb  1.9<L>  /  TBili  1.4<H>  /  DBili  1.0<H>  /  AST  32  /  ALT  19  /  AlkPhos  80  06-01      PT/INR - ( 01 Jun 2023 05:30 )   PT: 15.2 sec;   INR: 1.27          PTT - ( 01 Jun 2023 05:30 )  PTT:35.7 sec          RADIOLOGY & ADDITIONAL STUDIES:      Phosphorus Level, Serum: 4.1 mg/dL (06-01-23 @ 05:30)  Hemoglobin: 8.6 g/dL (06-01-23 @ 05:30)  Hemoglobin: 7.3 g/dL (06-01-23 @ 03:00)  Phosphorus Level, Serum: 4.3 mg/dL (06-01-23 @ 03:00)    Albumin, Serum: 1.9 g/dL (06-01-23 @ 05:30)  Albumin, Serum: 1.9 g/dL (05-31-23 @ 12:33)      CRRT Treatment:   Modality: CVVHD, Filter: NxStage CAR-505, Target Blood Flow: 300 mL/Min  Target Fluid Balance: Titrate to net NEGATIVE fluid balance, 100 mL/Hr (05-31-23 @ 15:29) [Active]

## 2023-06-01 NOTE — PROGRESS NOTE ADULT - ASSESSMENT
A/p: 53yMale w/ PMHx of HTN, type A aortic dissection s/p Dacron grafts, AV resuspension in 2013, CAD s/p CABG x 1 SVG to RCA (5/2013 with Dr. Medina), seizure disorder, recent admission 3/20-4/14 for replacement of transverse aortic arch, second stage TEVAR and aorto-axillary bypass, AV replacement (bio 23mm), and CABG x 1 (SVG-RCA). Pt found to have endo leak and taken to OR for TEVAR revision on 5/5, Post op course c/b Klebsiella bacteremia (5/15), resp failure requiring intubation on 5/18, Mucus plugging s/p Bronch 5/19 and PEA arrest. Post operatively pt also found to have hemorrhagic pancreatitis with venu-pancreatic abscess possibly 2* to Depakote therefore s/p IR aspiration of peripancreatic fluid collection and paracentesis on 5/22, IR venu-pancreatic abscess drainage and placement of drainage catheter on 5/24. Pt then transferred from CTICU to SICU for further mgmt of hemorrhagic pancreatitis on 5/25. s/p IR placement of 2 new drainage catheters and catheter exchange on 5/26. LUQ drainage 5/30.    NEURO: Fentanyl, Propfol gtt. RASS -3 to -4. Nimbex started. Hx seizures: epilepsy recs appreciated, Cont vimpat. Depakote weaned off due to concerns for drug-related hemorrhagic pancreatitis.   HEENT: Artificial tears  CV: s/p PEA arrest on 5/24 night, Septic shock: on Vaso and Levophed for MAP> 65. Echo from 5/19 hyperdynamic LV, SHAJI with LVOTO (gradient 26), normal RV, mild MR, no pulm htn. Cont ASA 81mg.    PULM: intubated 5/13 on AC 40/500/22/8, ARDS now resolved - off NO, s/p multiple Mucus plug/ Lung collapse s/p bronch x3 (most recently on 5/26) most likely from outward obstruction: Cont rotating pt around the clock. Cont Duonebs, Mucomyst, 3% saline & Chest PT q4. CPAP trial 40%/5/5 daily  GI/FEN: NPO on TPN/Lipids,Trickle TF Nepro at 10 - held this AM due to small volume bilious emesis. Protonix BID, Hemorrhagic pancreatitis: s/p Multiple Drain placements and paracentisis by IR team.  FOBT+ 5/29. Constipation: resolved, now diarrhea: Cont Rectal tube,  : TREY on CVVHD - continue with net negative given increase levophed requirements. Nephro following. Cantrell d/c'ed 5/26 - Continue bladder scan daily for poss straight cath.   HEME: Acute blood loss anemia on 5/12 requiring 11units of PRBC. IR negative for mesenteric extrav. s/p 5u pPRBC since transfer to SICU - most recent 1uPRBC O/N for Hb 6.6. Plt 37 this AM - HSQ held. 5/31: RBC 7U, 4 PLT, 1 FFP. TXA BID  ENDO: mISS  ID: Chloe (5/21-), ID following. Kleb bacteremia from 5/15 & 5/17, DC: Caspo (5/23-5/30), Ampicillin ( 5/21- 5/27) subsequent bld cx negative. PNA w/ bronch cx kleb, enterobacter (zosyn, erta resistant), E faecalis, strep angionosus from 5/19, pancreatic abscess cx pan-sensitive kleb 5/22.   PPX: SCDs, SQH on hold.   LINES: L rad kalpana (5/31--), Lsubclav HD Cath (5/31--), LIJ TLC (5/23--) // DC: RIJ TLC (5/22-5/27), RIJ HD cath (5/22-31), R Ax kalpana(5/12-5/31)  WOUNDS/DRAINS: 3 CODIE drains, 2 RP drains  PT: PT/OR ordered on 5/28  Dispo: SICU A/p: 53yMale w/ PMHx of HTN, type A aortic dissection s/p Dacron grafts, AV resuspension in 2013, CAD s/p CABG x 1 SVG to RCA (5/2013 with Dr. Medina), seizure disorder, recent admission 3/20-4/14 for replacement of transverse aortic arch, second stage TEVAR and aorto-axillary bypass, AV replacement (bio 23mm), and CABG x 1 (SVG-RCA). Pt found to have endo leak and taken to OR for TEVAR revision on 5/5, Post op course c/b Klebsiella bacteremia (5/15), resp failure requiring intubation on 5/18, Mucus plugging s/p Bronch 5/19 and PEA arrest. Post operatively pt also found to have hemorrhagic pancreatitis with venu-pancreatic abscess possibly 2* to Depakote therefore s/p IR aspiration of peripancreatic fluid collection and paracentesis on 5/22, IR venu-pancreatic abscess drainage and placement of drainage catheter on 5/24. Pt then transferred from CTICU to SICU for further mgmt of hemorrhagic pancreatitis on 5/25. s/p IR placement of 2 new drainage catheters and catheter exchange on 5/26. LUQ drainage 5/30. OR exlap washout & replacement of 3 new JPs and abthera vac with open abdomen. plan for RTOR today.     NEURO: Fentanyl gtts, Propofol gtt. RASS -5 while on Nimbex. Hx seizures: epilepsy recs appreciated, Cont vimpat. Depakote weaned off due to concerns for drug-related hemorrhagic pancreatitis.   HEENT: Artificial tears  CV: s/p PEA arrest on 5/24 night, Septic shock: on Vaso 0.04u, but off levo since last night. Echo from 5/19 hyperdynamic LV, SHAJI with LVOTO (gradient 26), normal RV, mild MR, no pulm htn. Cont ASA 81mg.    PULM: intubated 5/13 on AC 40/500/22/8, ARDS resolved - off NO, s/p multiple Mucus plug/ Lung collapse s/p bronch x3 (most recently on 5/26) most likely from outward obstruction: Cont rotating pt around the clock. Cont Duonebs, Mucomyst, 3% saline  GI/FEN: NPO on TPN, hold lipids while on propofol. holding Trickle TF Nepro pending OR today.  Protonix BID, Hemorrhagic pancreatitis: s/p Multiple Drain placements and paracentisis by IR team.  FOBT+ 5/29. Constipation: resolved, now diarrhea: Cont Rectal tube, abd open with abthera vac. pending RTOR today.   : TREY on CVVHD - continue with net even in setting of intraabdominal bleed post-op. Nephro following. Cantrell d/c'ed 5/26 - Continue bladder scan daily for poss straight cath.   HEME: Acute blood loss anemia on 5/12 requiring 11units of PRBC. IR negative for mesenteric extrav. s/p 5u pPRBC since transfer to SICU, 5/31: RBC 7U, 4 PLT, 1 FFP.  intraabdominal bleed since OR, s/p 1U PRBC intraop and 1U PRBC and 1u plt this morning - HSQ held. on TXA BID, but might not need it any longer if no further intraabdominal bleed.   ENDO: mISS  ID: Chloe (5/21-), ID following. Kleb bacteremia from 5/15 & 5/17, subsequent bld cx negative, /// DC: Caspo (5/23-5/30), Ampicillin ( 5/21- 5/27). PNA w/ bronch cx kleb, enterobacter (zosyn, erta resistant), E faecalis, strep angionosus from 5/19, pancreatic abscess cx pan-sensitive kleb 5/22.   PPX: SCDs, SQH on hold.   LINES: L rad kalpana (5/31--), Lsubclav HD Cath (5/31--), LIJ TLC (5/23--) // DC: RIJ TLC (5/22-5/27), RIJ HD cath (5/22-31), R Ax kalpana(5/12-5/31)  WOUNDS/DRAINS: right OR CODIE, 2 left OR CODIE, left IR hematoma drain, left IR pancreatic drain.   PT: PT/OR ordered on 5/28, but holding off in setting of open abdomen.   Dispo: SICU

## 2023-06-01 NOTE — PROGRESS NOTE ADULT - ASSESSMENT
53-year-old male with PMHx of HTN, Type A aortic dissection s/p Dacron grafts, AV resuspension in 2013, CAD s/p CABG x 1 SVG to RCA (05/2013, Dr. Medina), seizure disorder (last episode on 07/04/2022) S/P replacement of transverse aortic arch, second stage thoracic endovascular aortic repair, aorto-axillary bypass, AV replacement (bio 23mm), in APr 2023 and CABG x 1 (SVG-RCA) EF 75% (Ignacio, 03/2020) now s/p 2nd state TEVAR (05/05/2023) prompting General Surgery consult for acute pancreatitis with large peripancreatic/perigastric fluid collections on CTA abdomen 05/08/2023 followed by acute hemorrhage starting 05/12/2023 requiring 11 U pRBC, but with negative mesenteric angiogram 05/13/2023 prompting Hepatobiliary Surgery reconsult for possible hemorrhagic pancreatitis, 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  Patient is s/p OR washout (05/31/2023), drains (RP, LUQ, pelvis) placement, and abthera wound vac.      Overnight repeat H&H demonstrated Hgb 8.1 drop to 7.3 resulting in transfusion of 1u pRBC and 1u FFP.  Vitals have been stable with wean off of Levophen and continued requirement of Vasopressin (0.04).  Patient remain sedated and paralyzed on full vent support with serosanguinous drainage from all 3 new CODIE drains and Abthera wound vac, and IR CODIE drains have bilious output.      PLAN  - Return to OR today  - Continue TPN and NPO status  - CBC Q4H with transfusion for Hgb < 8 as needed  -     NEURO: Precedex gtt. Oxycodone 5/10 PRN. Hx seizures: s/p epilepsy consult, Cont vimpat. Depakote weaned off due to concerns for drug-related hemorrhagic pancreatitis.   HEENT: Artificial tears  CV: Septic shock: On Levophed for MAP> 65. Echo from 5/19 hyperdynamic LV, SHAJI with LVOTO (gradient 26), normal RV, mild MR, no pulm htn. Cont ASA 81mg.    PULM: intubated 5/13 on AC 40/500/22/8, ARDS now resolved - off NO, s/p multiple Mucus plug/ Lung collapse s/p bronch x3 (most recently on 5/26) most likely from outward obstruction: Cont rotating pt around the clock. Cont Duonebs, & Chest PT q4. CPAP trial 40%/8/8 daily. CT chest 5/29 - with mucus plugging of L mainstem bronchus likely 2/2 extrinisic compression. Bronchoscopy today as per SICU  GI/FEN: NPO on TPN/Lipids,  Holding TF Nepro for potential IR today, restart post procedure. protonix BID, Hemorrhagic pancreatitis: s/p Multiple Drain placements and paracentisis by IR team.  FOBT+ 5/29. Constipation: resolved, now diarrhea: Cont Rectal tube. For OR today for exploratory laparotomy for diagnosis, drainage, and hemostasis control.  : TREY on CVVHD. Will attempt to remove fluid today for goal 500-1L negative if BP allows. Nephro following. Cantrell d/c'ed 5/26 - Continue bladder scan daily for poss straight cath.   HEME: Acute blood loss anemia on 5/12 requiring 11units of PRBC. IR negative for mesenteric extrav. s/p 3u pPRBC since transfer to SICU. Plt 50 this AM - HIT panel  negative 3/26.  ENDO: mISS  ID: Chloe (5/21-) Caspo (5/23-) ID following. Kleb bacteremia from 5/15 & 5/17, DC: Ampicillin ( 5/21- 5/27) subsequent bld cx negative. PNA w/ bronch cx kleb, enterobacter (zosyn, erta resistant), E faecalis, strep angionosus from 5/19, pancreatic abscess cx pan-sensitive kleb 5/22.   PPX: SCDs, SQH.   LINES: R Ax kalpana(5/12-) RIJ HD cath (5/22--), LIJ TLC (5/23--) Dc:  RIJ TLC (5/22-5/27),   WOUNDS/DRAINS: left venu-pancreatic abscess CODIE drain x 2, ascites CODIE x 2  PT: PT/OR ordered on 5/28  Dispo: SICU level of care at this time. For OR Today.      53-year-old male with PMHx of HTN, Type A aortic dissection s/p Dacron grafts, AV resuspension in 2013, CAD s/p CABG x 1 SVG to RCA (05/2013, Dr. Medina), seizure disorder (last episode on 07/04/2022) S/P replacement of transverse aortic arch, second stage thoracic endovascular aortic repair, aorto-axillary bypass, AV replacement (bio 23mm), in APr 2023 and CABG x 1 (SVG-RCA) EF 75% (Ignacio, 03/2020) now s/p 2nd state TEVAR (05/05/2023) prompting General Surgery consult for acute pancreatitis with large peripancreatic/perigastric fluid collections on CTA abdomen 05/08/2023 followed by acute hemorrhage starting 05/12/2023 requiring 11 U pRBC, but with negative mesenteric angiogram 05/13/2023 prompting Hepatobiliary Surgery reconsult for possible hemorrhagic pancreatitis, 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  Patient is s/p OR washout (05/31/2023), drains (RP, LUQ, pelvis) placement, and abthera wound vac.      Overnight repeat H&H demonstrated Hgb 8.1 drop to 7.3 resulting in transfusion of 1u pRBC and 1u FFP.  Vitals have been stable with wean off of Levophen and continued requirement of Vasopressin (0.04).  Patient remain sedated and paralyzed on full vent support with serosanguinous drainage from all 3 new CODIE drains and Abthera wound vac, and IR CODIE drains have bilious output.      PLAN  - Return to OR today  - Continue TPN and NPO status  - CBC Q4H with transfusion for Hgb < 8 as needed  - Appreciate SICU care  - Please page Surgery Team 1 at [phone] with any questions and/or clinical changes

## 2023-06-01 NOTE — PRE-OP CHECKLIST - NS PREOP CHK MONITOR ANESTHESIA CONSENT
[Alert] : alert [Well Nourished] : well nourished [Healthy Appearance] : healthy appearance [EOMI] : extra ocular movement intact [No Acute Distress] : no acute distress [No LAD] : no lymphadenopathy [Normal S1, S2] : normal S1 and S2 [No Edema] : no peripheral edema [Cranial Nerves Intact] : cranial nerves 2-12 were intact [Normal Rate] : heart rate was normal [Oriented x3] : oriented to person, place, and time [No Tremors] : no tremors [Normal Reflexes] : deep tendon reflexes were 2+ and symmetric [Normal Affect] : the affect was normal [Normal Insight/Judgement] : insight and judgment were intact [de-identified] : Left thyroid nodule - nontender [Normal Mood] : the mood was normal done

## 2023-06-01 NOTE — PROGRESS NOTE ADULT - SUBJECTIVE AND OBJECTIVE BOX
INTERVAL HPI/OVERNIGHT EVENTS:    Patient was seen and examined at bedside.  Events noted.  Sedated, paralized, on pressors    ROS:    unable to obtain         ANTIBIOTICS/RELEVANT:    MEDICATIONS  (STANDING):  acetylcysteine 20%  Inhalation 4 milliLiter(s) Inhalation every 6 hours  artificial  tears Solution 1 Drop(s) Both EYES two times a day  chlorhexidine 0.12% Liquid 15 milliLiter(s) Oral Mucosa every 12 hours  chlorhexidine 2% Cloths 1 Application(s) Topical daily  cisatracurium Infusion 3 MICROgram(s)/kG/Min (12.6 mL/Hr) IV Continuous <Continuous>  CRRT Treatment    <Continuous>  dextrose 50% Injectable 25 Gram(s) IV Push once  fentaNYL   Infusion... 0.5 MICROgram(s)/kG/Hr (1.75 mL/Hr) IV Continuous <Continuous>  glucagon  Injectable 1 milliGRAM(s) IntraMuscular once  lacosamide IVPB 250 milliGRAM(s) IV Intermittent every 12 hours  meropenem  IVPB 1000 milliGRAM(s) IV Intermittent every 8 hours  pantoprazole  Injectable 40 milliGRAM(s) IV Push daily  Parenteral Nutrition - Adult 1 Each (70 mL/Hr) TPN Continuous <Continuous>  Parenteral Nutrition - Adult 1 Each (70 mL/Hr) TPN Continuous <Continuous>  petrolatum Ophthalmic Ointment 1 Application(s) Both EYES daily  Phoxillum Filtration BK 4 / 2.5 5000 milliLiter(s) (1500 mL/Hr) CRRT <Continuous>  propofol Infusion 10 MICROgram(s)/kG/Min (4.2 mL/Hr) IV Continuous <Continuous>  sodium chloride 3%  Inhalation 4 milliLiter(s) Inhalation every 6 hours  tranexamic acid IVPB 1000 milliGRAM(s) IV Intermittent every 12 hours  vasopressin Infusion 0.04 Unit(s)/Min (6 mL/Hr) IV Continuous <Continuous>    MEDICATIONS  (PRN):  dextrose Oral Gel 15 Gram(s) Oral once PRN Blood Glucose LESS THAN 70 milliGRAM(s)/deciliter        Vital Signs Last 24 Hrs  T(C): 35.3 (01 Jun 2023 10:29), Max: 38.1 (31 May 2023 17:55)  T(F): 95.6 (01 Jun 2023 09:47), Max: 100.6 (31 May 2023 17:55)  HR: 120 (01 Jun 2023 11:45) (91 - 120)  BP: 126/63 (01 Jun 2023 10:29) (126/63 - 126/63)  BP(mean): 72 (01 Jun 2023 10:29) (72 - 72)  RR: 18 (01 Jun 2023 11:45) (16 - 24)  SpO2: 100% (01 Jun 2023 11:45) (97% - 100%)    Parameters below as of 01 Jun 2023 11:45  Patient On (Oxygen Delivery Method): ventilator    O2 Concentration (%): 40    PHYSICAL EXAM:  Constitutional: ill appearing  Eyes:IVAN, EOMI  Ear/Nose/Throat: no oral lesion, no sinus tenderness on percussion	  Neck:  supple  Respiratory: CTA marti  Cardiovascular: S1S2 RRR, no murmurs  Gastrointestinal:soft, large abdominal wound with wound vac   Extremities:no e/e/c  Vascular: DP Pulse:	right normal; left normal      LABS:                        8.6    11.57 )-----------( 52       ( 01 Jun 2023 05:30 )             24.7     06-01    135  |  104  |  34<H>  ----------------------------<  113<H>  3.6   |  23  |  0.87    Ca    7.2<L>      01 Jun 2023 05:30  Phos  4.1     06-01  Mg     2.2     06-01    TPro  4.2<L>  /  Alb  1.9<L>  /  TBili  1.4<H>  /  DBili  1.0<H>  /  AST  32  /  ALT  19  /  AlkPhos  80  06-01    PT/INR - ( 01 Jun 2023 05:30 )   PT: 15.2 sec;   INR: 1.27          PTT - ( 01 Jun 2023 05:30 )  PTT:35.7 sec      MICROBIOLOGY:    Culture - Body Fluid with Gram Stain (05.30.23 @ 14:55)    -  Trimethoprim/Sulfamethoxazole: S <=0.5/9.5   Gram Stain:   No organisms seen  Few-moderate White blood cells   -  Ampicillin: R >16 These ampicillin results predict results for amoxicillin   -  Ampicillin/Sulbactam: I 16/8 Enterobacter, Klebsiella aerogenes, Citrobacter, and Serratia may develop resistance during prolonged therapy (3-4 days)   -  Cefazolin: S <=2 Enterobacter, Klebsiella aerogenes, Citrobacter, and Serratia may develop resistance during prolonged therapy (3-4 days)   -  Ceftriaxone: S <=1 Enterobacter, Klebsiella aerogenes, Citrobacter, and Serratia may develop resistance during prolonged therapy   -  Ciprofloxacin: S <=0.25   -  Ertapenem: S <=0.5   -  Gentamicin: S <=2   -  Piperacillin/Tazobactam: S <=8   -  Tobramycin: S <=2   Specimen Source: .Body Fluid LUQ Drain   Culture Results:   Rare Klebsiella pneumoniae   Organism Identification: Klebsiella pneumoniae   Organism: Klebsiella pneumoniae   Method Type: LIZETTE        RADIOLOGY & ADDITIONAL STUDIES:

## 2023-06-01 NOTE — PROGRESS NOTE ADULT - ASSESSMENT
51 YO Male, HTN aortic dissection previous admission with severe cardiogenic shock and oliguric TREY requiring CVVHD. Presented to the ED with worsening epigastric pain CTA/P revealed continued endoleak hospital course complicated by necrotic/hemorrhagic pancreatitis      #oligoanuric ATN 2/2 cardiogenic shock  #necrotizing pancreatitis        recommend:  patient remains oliguric with significant volume overload   will continue with CVVHD  qb 300ml/min DFR 1.5L/hour net negative 100ml/hour will switch to phoxillum   q8H BMP while on CVVHD  Check phos daily  maintain MAP > 70 for adequate renal perfusion  strict i's and o's  renally dose abx egfr <15  Will recommend avoiding IV Contrast to prevent further renal insult as pt still oliguric, requiring CVVHD support  BID bladder scan to assess for signs of renal recovery     Malika Garcia D.O  PGY 5 nephrology fellow  [phone]       53 YO Male, HTN aortic dissection previous admission with severe cardiogenic shock and oliguric TREY requiring CVVHD. Presented to the ED with worsening epigastric pain CTA/P revealed continued endoleak hospital course complicated by necrotic/hemorrhagic pancreatitis      #oligoanuric ATN 2/2 cardiogenic shock  #necrotizing pancreatitis        recommend:  patient remains oliguric with significant volume overload   will continue with CVVHD  qb 300ml/min DFR 1.5L/hour net negative 150ml/hour will switch to phoxillum   q8H BMP while on CVVHD  Check phos daily  maintain MAP > 70 for adequate renal perfusion  strict i's and o's  renally dose abx egfr <15  Will recommend avoiding IV Contrast to prevent further renal insult as pt still oliguric, requiring CVVHD support  BID bladder scan to assess for signs of renal recovery     Malika Garcia D.O  PGY 5 nephrology fellow  [phone]

## 2023-06-01 NOTE — PROGRESS NOTE ADULT - ATTENDING COMMENTS
CVVHD on hold for OR abdominal washout, septic shock 2/2 necrotizing pancreatitis with improving pressor support but bleeding.   Can restart CVVHD after OR, goal UF 100ml/hr. Please call fellow for any questions regarding resuming dialysis, volume issues etc post op

## 2023-06-01 NOTE — PROGRESS NOTE ADULT - SUBJECTIVE AND OBJECTIVE BOX
SUBJECTIVE: Patient seen and examined at bedside.    meropenem  IVPB 1000 milliGRAM(s) IV Intermittent every 8 hours  norepinephrine Infusion 0.05 MICROgram(s)/kG/Min IV Continuous <Continuous>  tranexamic acid IVPB 1000 milliGRAM(s) IV Intermittent every 12 hours    MEDICATIONS  (PRN):  dextrose Oral Gel 15 Gram(s) Oral once PRN Blood Glucose LESS THAN 70 milliGRAM(s)/deciliter      I&O's Detail    30 May 2023 07:01  -  31 May 2023 07:00  --------------------------------------------------------  IN:    Dexmedetomidine: 357.5 mL    Enteral Tube Flush: 70 mL    Fat Emulsion (Fish Oil &amp; Plant Based) 20% Infusion: 249.6 mL    IV PiggyBack: 150 mL    IV PiggyBack: 400 mL    IV PiggyBack: 250 mL    Nepro with Carb Steady: 50 mL    Norepinephrine: 190.8 mL    PRBCs (Packed Red Blood Cells): 300 mL    Propofol: 8.4 mL    TPN (Total Parenteral Nutrition): 1470 mL  Total IN: 3496.3 mL    OUT:    Bulb (mL): 35 mL    Bulb (mL): 105 mL    Drain (mL): 30 mL    Drain (mL): 205 mL    Drain (mL): 48 mL    Drain (mL): 58 mL    Drain (mL): 10 mL    Drain (mL): 260 mL    Incontinent per Condom Catheter (mL): 1 mL    Other (mL): 2404 mL    Voided (mL): 486 mL  Total OUT: 3642 mL    Total NET: -145.7 mL      31 May 2023 07:01  -  01 Jun 2023 06:40  --------------------------------------------------------  IN:    Cisatracurium: 197.4 mL    Dexmedetomidine: 17.5 mL    FentaNYL: 59.5 mL    IV PiggyBack: 100 mL    IV PiggyBack: 100 mL    IV PiggyBack: 100 mL    IV PiggyBack: 50 mL    Norepinephrine: 16.4 mL    Norepinephrine: 25.4 mL    Platelets - Single Donor: 637 mL    PRBCs (Packed Red Blood Cells): 1100 mL    Propofol: 329.7 mL    TPN (Total Parenteral Nutrition): 1400 mL    Vasopressin: 108 mL  Total IN: 4240.9 mL    OUT:    Bulb (mL): 60 mL    Bulb (mL): 95 mL    Bulb (mL): 130 mL    Drain (mL): 315 mL    Drain (mL): 60 mL    Other (mL): 875 mL    VAC (Vacuum Assisted Closure) System (mL): 2500 mL    Voided (mL): 270 mL  Total OUT: 4305 mL    Total NET: -64.1 mL          T(C): 35.6 (06-01-23 @ 05:40), Max: 38.2 (05-31-23 @ 08:26)  HR: 100 (06-01-23 @ 05:02) (100 - 161)  BP: 93/60 (05-31-23 @ 08:26) (93/60 - 93/60)  RR: 18 (06-01-23 @ 05:00) (14 - 24)  SpO2: 100% (06-01-23 @ 05:02) (96% - 100%)    GENERAL: NAD, Resting comfortably in bed, awake, opens eyes spontaneously  HEENT: NCAT, MMM, Normal conjunctiva, PERRL  RESP: Nonlabored breathing, No respiratory distress  CARD: Normal rate, Normal peripheral perfusion  GI: Soft, ND, NT, No guarding, No rebound tenderness  EXTREM: WWP, No edema, No gross deformity of extremities  SKIN: No rashes, no lesions  NEURO: AAOx3, No focal motor or sensory deficits  PSYCH: Affect and characteristics of appearance, verbalizations, and behaviors are appropriate    LABS:                        7.3    10.62 )-----------( 60       ( 01 Jun 2023 03:00 )             21.3     06-01    133<L>  |  102  |  34<H>  ----------------------------<  176<H>  3.6   |  22  |  0.90    Ca    7.6<L>      01 Jun 2023 03:00  Phos  4.3     06-01  Mg     2.0     06-01    TPro  4.5<L>  /  Alb  1.9<L>  /  TBili  2.4<H>  /  DBili  1.8<H>  /  AST  80<H>  /  ALT  23  /  AlkPhos  99  05-31    PT/INR - ( 01 Jun 2023 03:00 )   PT: 15.1 sec;   INR: 1.27          PTT - ( 01 Jun 2023 03:00 )  PTT:34.0 sec      RADIOLOGY & ADDITIONAL STUDIES:      Culture - Body Fluid with Gram Stain (collected 05-30-23 @ 14:55)  Source: .Body Fluid LUQ Drain  Gram Stain (05-30-23 @ 21:07):    No organisms seen    Few-moderate White blood cells  Preliminary Report (05-31-23 @ 13:36):    Rare Klebsiella pneumoniae    Susceptibility to follow.    Culture - Blood (collected 05-29-23 @ 11:26)  Source: .Blood Blood-Peripheral  Preliminary Report (05-31-23 @ 14:01):    No growth at 2 days.    Culture - Blood (collected 05-29-23 @ 11:26)  Source: .Blood Blood-Peripheral  Preliminary Report (05-31-23 @ 14:01):    No growth at 2 days.    Culture - Blood (collected 05-27-23 @ 15:12)  Source: .Blood Blood-Peripheral  Preliminary Report (05-31-23 @ 16:01):    No growth at 4 days.    Culture - Blood (collected 05-27-23 @ 15:12)  Source: .Blood Blood-Peripheral  Preliminary Report (05-31-23 @ 16:01):    No growth at 4 days.     SUBJECTIVE: Patient seen and examined at bedside.  Overnight the patient received 1u PRBC 2/2 Hgb 8.1 > 7.3 and 1u FFP.  Patient remains paralyzed, sedated and intubated with full ventilator support.    meropenem  IVPB 1000 milliGRAM(s) IV Intermittent every 8 hours  norepinephrine Infusion 0.05 MICROgram(s)/kG/Min IV Continuous <Continuous>  tranexamic acid IVPB 1000 milliGRAM(s) IV Intermittent every 12 hours    MEDICATIONS  (PRN):  dextrose Oral Gel 15 Gram(s) Oral once PRN Blood Glucose LESS THAN 70 milliGRAM(s)/deciliter      I&O's Detail    30 May 2023 07:01  -  31 May 2023 07:00  --------------------------------------------------------  IN:    Dexmedetomidine: 357.5 mL    Enteral Tube Flush: 70 mL    Fat Emulsion (Fish Oil &amp; Plant Based) 20% Infusion: 249.6 mL    IV PiggyBack: 150 mL    IV PiggyBack: 400 mL    IV PiggyBack: 250 mL    Nepro with Carb Steady: 50 mL    Norepinephrine: 190.8 mL    PRBCs (Packed Red Blood Cells): 300 mL    Propofol: 8.4 mL    TPN (Total Parenteral Nutrition): 1470 mL  Total IN: 3496.3 mL    OUT:    Bulb (mL): 35 mL    Bulb (mL): 105 mL    Drain (mL): 30 mL    Drain (mL): 205 mL    Drain (mL): 48 mL    Drain (mL): 58 mL    Drain (mL): 10 mL    Drain (mL): 260 mL    Incontinent per Condom Catheter (mL): 1 mL    Other (mL): 2404 mL    Voided (mL): 486 mL  Total OUT: 3642 mL    Total NET: -145.7 mL      31 May 2023 07:01  -  01 Jun 2023 06:40  --------------------------------------------------------  IN:    Cisatracurium: 197.4 mL    Dexmedetomidine: 17.5 mL    FentaNYL: 59.5 mL    IV PiggyBack: 100 mL    IV PiggyBack: 100 mL    IV PiggyBack: 100 mL    IV PiggyBack: 50 mL    Norepinephrine: 16.4 mL    Norepinephrine: 25.4 mL    Platelets - Single Donor: 637 mL    PRBCs (Packed Red Blood Cells): 1100 mL    Propofol: 329.7 mL    TPN (Total Parenteral Nutrition): 1400 mL    Vasopressin: 108 mL  Total IN: 4240.9 mL    OUT:    Bulb (mL): 60 mL    Bulb (mL): 95 mL    Bulb (mL): 130 mL    Drain (mL): 315 mL    Drain (mL): 60 mL    Other (mL): 875 mL    VAC (Vacuum Assisted Closure) System (mL): 2500 mL    Voided (mL): 270 mL  Total OUT: 4305 mL    Total NET: -64.1 mL          T(C): 35.6 (06-01-23 @ 05:40), Max: 38.2 (05-31-23 @ 08:26)  HR: 100 (06-01-23 @ 05:02) (100 - 161)  BP: 93/60 (05-31-23 @ 08:26) (93/60 - 93/60)  RR: 18 (06-01-23 @ 05:00) (14 - 24)  SpO2: 100% (06-01-23 @ 05:02) (96% - 100%)    GENERAL: NAD, Resting comfortably in bed, awake, opens eyes spontaneously  HEENT: NCAT, MMM, Normal conjunctiva  RESP: Nonlabored breathing on full ventilator support, No respiratory distress  CARD: Normal rate, Normal peripheral perfusion, L chest with HD catheter for CVVD  GI: Soft, ND, No guarding, No rebound tenderness, 3 new CODIE drains (RP, LUQ, pelvis) with serosanguinous drainage, 2 IR drains with dark bilious output, Abthera wound vac with serosanguinous output and collection of coagulated bloody fluid at the apex of the midline incision beneath the Abthera vac  :  Condom catheter in place with minimal urine output  EXTREM: WWP, No edema, No gross deformity of extremities  SKIN: No rashes, no lesions  NEURO: Sedated and paralyzed, No focal motor or sensory deficits  PSYCH: Affect and characteristics of appearance, verbalizations, and behaviors are appropriate    LABS:                        7.3    10.62 )-----------( 60       ( 01 Jun 2023 03:00 )             21.3     06-01    133<L>  |  102  |  34<H>  ----------------------------<  176<H>  3.6   |  22  |  0.90    Ca    7.6<L>      01 Jun 2023 03:00  Phos  4.3     06-01  Mg     2.0     06-01    TPro  4.5<L>  /  Alb  1.9<L>  /  TBili  2.4<H>  /  DBili  1.8<H>  /  AST  80<H>  /  ALT  23  /  AlkPhos  99  05-31    PT/INR - ( 01 Jun 2023 03:00 )   PT: 15.1 sec;   INR: 1.27          PTT - ( 01 Jun 2023 03:00 )  PTT:34.0 sec      RADIOLOGY & ADDITIONAL STUDIES:      Culture - Body Fluid with Gram Stain (collected 05-30-23 @ 14:55)  Source: .Body Fluid LUQ Drain  Gram Stain (05-30-23 @ 21:07):    No organisms seen    Few-moderate White blood cells  Preliminary Report (05-31-23 @ 13:36):    Rare Klebsiella pneumoniae    Susceptibility to follow.    Culture - Blood (collected 05-29-23 @ 11:26)  Source: .Blood Blood-Peripheral  Preliminary Report (05-31-23 @ 14:01):    No growth at 2 days.    Culture - Blood (collected 05-29-23 @ 11:26)  Source: .Blood Blood-Peripheral  Preliminary Report (05-31-23 @ 14:01):    No growth at 2 days.    Culture - Blood (collected 05-27-23 @ 15:12)  Source: .Blood Blood-Peripheral  Preliminary Report (05-31-23 @ 16:01):    No growth at 4 days.    Culture - Blood (collected 05-27-23 @ 15:12)  Source: .Blood Blood-Peripheral  Preliminary Report (05-31-23 @ 16:01):    No growth at 4 days.

## 2023-06-01 NOTE — PROCEDURE NOTE - NSCVLATTEMPTSITEVASC_A_CORE
right/internal jugular

## 2023-06-01 NOTE — BRIEF OPERATIVE NOTE - OPERATION/FINDINGS
Procedure: 2nd look operation, J tube placement, abd wall closure, EGD    Description: Abthera device removed. Small bowel appeared healthy, frozen abdomen in pelvis and LUQ, serosanguinous fluid suctioned from abdomen; no bile seen. EGD performed visualizing the first portion of the jejunum; no abnormalities seen, bubble leak test negative. 14Fr feeding jejunostomy Procedure: 2nd look operation, J tube placement, abd wall closure, EGD    Description: Abthera device removed. Small bowel appeared healthy, frozen abdomen in pelvis and LUQ, serosanguinous fluid suctioned from abdomen; no bile seen. EGD performed visualizing the first portion of the jejunum; no abnormalities seen, bubble leak test negative. 14Fr feeding jejunostomy placed In LLQ. Midline closed with 1 PDS x 2 and interrupted 1 vicryl retention sutures x 4. Skin stapled.

## 2023-06-01 NOTE — PROGRESS NOTE ADULT - ASSESSMENT
52yo Male pt with PMH HTN, type A aortic dissection s/p Dacron grafts, AV resuspension in 2013, CAD s/p CABG x 1 SVG to RCA (5/2013 with Dr. Medina), seizure disorder (last episode on 7/4/22) S/P replacement of transverse aortic arch, second stage thoracic endovascular aortic repair, aorto-axillary bypass, AV replacement (bio 23mm), in APr 2023 and CABG x 1 (SVG-RCA) EF 75% (Ignacio, 3/20) now s/p 2nd state TEVAR on 5/5 for whom surgery was consulted for finding of acute pancreatitis with large peripancreatic/perigastric fluid collections on CTA abdomen 5/8 then acute hemorrhage starting 5/12/23 requiring 11 U pRBC over last 48 hours but with negative mesenteric angiogram 5/13/23. S/p paracentesis and LUQ collection aspiration (no drain per surgery) on 5/22/23. LUQ collection growing Klebsiella pneumoniae.    5/24/23:  LUQ drain placement by IR.     5/25/23:  LUQ drain upsizing. Placement of two additional 14 F drainage catheters in the left mid and lower abdomen. Placement of a left abdominal hematoma drain.     5/26/23:   Placement of a right ascites drain and right pelvic hematoma/abscess drain by IR. Upsized left abdomen hematoma drain and left pelvic abscess/hematoma drain to 16 Fr.    5/30/23:  LUQ drain placement.     5/31/23:  To OR for ex-lap and abdominal washout. Multiple IR drains removed as described above.     6/1/23:  Return to OR.     -Continue to monitor output   -IR to follow.

## 2023-06-01 NOTE — PROGRESS NOTE ADULT - ASSESSMENT
IMPRESSION:  53 year old male hx seizure disorder, aortic dissection s/p AVR, aortic graft revision 3/20/2023, second stage TEVAR 5/5/2023, course c/b peripancreatic/RP hemorrhage/hematoma formation. Suspect fevers due to enlarged collection as seen on CT. Now with change in clinical status, increasing pressor requirement and going to OR for acute abdomen.     Recommend:  1.  Continue Meropenem 1 gram IV q8hrs  2. If patient continues to decompensate ok to restart Caspofungin IV 50mg q24hrs + Vancomycin for broader coverage  3.  Follow up repeat cultures     ID team 1 will continue to follow

## 2023-06-01 NOTE — PROGRESS NOTE ADULT - SUBJECTIVE AND OBJECTIVE BOX
S: No new issues/events overnight, no new med c/o    O: ICU Vital Signs Last 24 Hrs  T(F): 95.6 (06-01-23 @ 09:47), Max: 100.6 (05-31-23 @ 17:55)  HR: 109 (06-01-23 @ 11:00) (91 - 127)  BP: 126/63 (06-01-23 @ 10:29) (126/63 - 126/63)  BP(mean): 72 (06-01-23 @ 10:29) (72 - 72)  ABP: 111/58 (06-01-23 @ 11:00)  RR: 18 (06-01-23 @ 11:00) (16 - 24)  SpO2: 100% (06-01-23 @ 11:00) (96% - 100%)    PHYSICAL EXAM:   Neurological: AAOx3, CNII-XII intact,  strength 5/5 b/l  ENT: mucus membrane moist  Cardiovascular: RRR  Respiratory: CTA  Gastrointestinal: soft, NT, ND, BS+  Extremities: warm, no dependent edema  Vascular: no cyanosis/erythema  Skin: no rashes  MSK: no joint swelling.     LABS:    06-01    135  |  104  |  34<H>  ----------------------------<  113<H>  3.6   |  23  |  0.87    Ca    7.2<L>      01 Jun 2023 05:30  Phos  4.1     06-01  Mg     2.2     06-01    TPro  4.2<L>  /  Alb  1.9<L>  /  TBili  1.4<H>  /  DBili  1.0<H>  /  AST  32  /  ALT  19  /  AlkPhos  80  06-01  LIVER FUNCTIONS - ( 01 Jun 2023 05:30 )  Alb: 1.9 g/dL / Pro: 4.2 g/dL / ALK PHOS: 80 U/L / ALT: 19 U/L / AST: 32 U/L / GGT: x                               8.6    11.57 )-----------( 52       ( 01 Jun 2023 05:30 )             24.7   PT/INR - ( 01 Jun 2023 05:30 )   PT: 15.2 sec;   INR: 1.27          PTT - ( 01 Jun 2023 05:30 )  PTT:35.7 secCARDIAC MARKERS ( 31 May 2023 07:59 )  x     / 0.15 ng/mL / 19 U/L / x     / 1.8 ng/mL    ABG - ( 01 Jun 2023 05:30 )  pH, Arterial: 7.43  pH, Blood: x     /  pCO2: 34    /  pO2: 178   / HCO3: 23    / Base Excess: -1.1  /  SaO2: 99.7            CAPILLARY BLOOD GLUCOSE      POCT Blood Glucose.: 144 mg/dL (31 May 2023 23:45)    MEDICATIONS  (STANDING):  acetylcysteine 20%  Inhalation 4 milliLiter(s) Inhalation every 6 hours  artificial  tears Solution 1 Drop(s) Both EYES two times a day  chlorhexidine 0.12% Liquid 15 milliLiter(s) Oral Mucosa every 12 hours  chlorhexidine 2% Cloths 1 Application(s) Topical daily  cisatracurium Infusion 3 MICROgram(s)/kG/Min (12.6 mL/Hr) IV Continuous <Continuous>  CRRT Treatment    <Continuous>  dextrose 50% Injectable 25 Gram(s) IV Push once  fentaNYL   Infusion... 0.5 MICROgram(s)/kG/Hr (1.75 mL/Hr) IV Continuous <Continuous>  glucagon  Injectable 1 milliGRAM(s) IntraMuscular once  lacosamide IVPB 250 milliGRAM(s) IV Intermittent every 12 hours  meropenem  IVPB 1000 milliGRAM(s) IV Intermittent every 8 hours  pantoprazole  Injectable 40 milliGRAM(s) IV Push daily  Parenteral Nutrition - Adult 1 Each (70 mL/Hr) TPN Continuous <Continuous>  Parenteral Nutrition - Adult 1 Each (70 mL/Hr) TPN Continuous <Continuous>  petrolatum Ophthalmic Ointment 1 Application(s) Both EYES daily  Phoxillum Filtration BK 4 / 2.5 5000 milliLiter(s) (1500 mL/Hr) CRRT <Continuous>  propofol Infusion 10 MICROgram(s)/kG/Min (4.2 mL/Hr) IV Continuous <Continuous>  sodium chloride 3%  Inhalation 4 milliLiter(s) Inhalation every 6 hours  tranexamic acid IVPB 1000 milliGRAM(s) IV Intermittent every 12 hours  vasopressin Infusion 0.04 Unit(s)/Min (6 mL/Hr) IV Continuous <Continuous>    MEDICATIONS  (PRN):  dextrose Oral Gel 15 Gram(s) Oral once PRN Blood Glucose LESS THAN 70 milliGRAM(s)/deciliter      Cantrell:	  [ ] None	[ ] Daily Cantrell Order Placed	   Indication:	  [ ] Strict I and O's    [ ] Obstruction     [ ] Incontinence + Stage 3 or 4 Decubitus  Central Line:  [ ] None	   [ ]  Medication / TPN Administration     [ ] No Peripheral IV        S: post-op abthera drained 1 liter overnight of sanguinous drainage.     O: ICU Vital Signs Last 24 Hrs  T(F): 95.6 (06-01-23 @ 09:47), Max: 100.6 (05-31-23 @ 17:55)  HR: 109 (06-01-23 @ 11:00) (91 - 127)  BP: 126/63 (06-01-23 @ 10:29) (126/63 - 126/63)  BP(mean): 72 (06-01-23 @ 10:29) (72 - 72)  ABP: 111/58 (06-01-23 @ 11:00)  RR: 18 (06-01-23 @ 11:00) (16 - 24)  SpO2: 100% (06-01-23 @ 11:00) (96% - 100%)    PHYSICAL EXAM:   Neurological: sedated while paralyzed with nimbex.   ENT: mucus membrane moist  Cardiovascular: RRR  Respiratory: CTA  Gastrointestinal: soft, ND, abthera vac with area of clot above sponge. right CODIE serosang, left CODIE x 2 serosang, left hematoma CODIE (IR) and left pancreatic CODIE (IR)  dark bilious,   Extremities: warm, no dependent edema  Vascular: no cyanosis/erythema  Skin: no rashes  MSK: no joint swelling.     LABS:    06-01    135  |  104  |  34<H>  ----------------------------<  113<H>  3.6   |  23  |  0.87    Ca    7.2<L>      01 Jun 2023 05:30  Phos  4.1     06-01  Mg     2.2     06-01    TPro  4.2<L>  /  Alb  1.9<L>  /  TBili  1.4<H>  /  DBili  1.0<H>  /  AST  32  /  ALT  19  /  AlkPhos  80  06-01  LIVER FUNCTIONS - ( 01 Jun 2023 05:30 )  Alb: 1.9 g/dL / Pro: 4.2 g/dL / ALK PHOS: 80 U/L / ALT: 19 U/L / AST: 32 U/L / GGT: x                               8.6    11.57 )-----------( 52       ( 01 Jun 2023 05:30 )             24.7   PT/INR - ( 01 Jun 2023 05:30 )   PT: 15.2 sec;   INR: 1.27          PTT - ( 01 Jun 2023 05:30 )  PTT:35.7 secCARDIAC MARKERS ( 31 May 2023 07:59 )  x     / 0.15 ng/mL / 19 U/L / x     / 1.8 ng/mL    ABG - ( 01 Jun 2023 05:30 )  pH, Arterial: 7.43  pH, Blood: x     /  pCO2: 34    /  pO2: 178   / HCO3: 23    / Base Excess: -1.1  /  SaO2: 99.7            CAPILLARY BLOOD GLUCOSE      POCT Blood Glucose.: 144 mg/dL (31 May 2023 23:45)    MEDICATIONS  (STANDING):  acetylcysteine 20%  Inhalation 4 milliLiter(s) Inhalation every 6 hours  artificial  tears Solution 1 Drop(s) Both EYES two times a day  chlorhexidine 0.12% Liquid 15 milliLiter(s) Oral Mucosa every 12 hours  chlorhexidine 2% Cloths 1 Application(s) Topical daily  cisatracurium Infusion 3 MICROgram(s)/kG/Min (12.6 mL/Hr) IV Continuous <Continuous>  CRRT Treatment    <Continuous>  dextrose 50% Injectable 25 Gram(s) IV Push once  fentaNYL   Infusion... 0.5 MICROgram(s)/kG/Hr (1.75 mL/Hr) IV Continuous <Continuous>  glucagon  Injectable 1 milliGRAM(s) IntraMuscular once  lacosamide IVPB 250 milliGRAM(s) IV Intermittent every 12 hours  meropenem  IVPB 1000 milliGRAM(s) IV Intermittent every 8 hours  pantoprazole  Injectable 40 milliGRAM(s) IV Push daily  Parenteral Nutrition - Adult 1 Each (70 mL/Hr) TPN Continuous <Continuous>  Parenteral Nutrition - Adult 1 Each (70 mL/Hr) TPN Continuous <Continuous>  petrolatum Ophthalmic Ointment 1 Application(s) Both EYES daily  Phoxillum Filtration BK 4 / 2.5 5000 milliLiter(s) (1500 mL/Hr) CRRT <Continuous>  propofol Infusion 10 MICROgram(s)/kG/Min (4.2 mL/Hr) IV Continuous <Continuous>  sodium chloride 3%  Inhalation 4 milliLiter(s) Inhalation every 6 hours  tranexamic acid IVPB 1000 milliGRAM(s) IV Intermittent every 12 hours  vasopressin Infusion 0.04 Unit(s)/Min (6 mL/Hr) IV Continuous <Continuous>    MEDICATIONS  (PRN):  dextrose Oral Gel 15 Gram(s) Oral once PRN Blood Glucose LESS THAN 70 milliGRAM(s)/deciliter      Cantrell:	  [x ] None	[ ] Daily Cantrell Order Placed	   Indication:	  [ ] Strict I and O's    [ ] Obstruction     [ ] Incontinence + Stage 3 or 4 Decubitus  Central Line:  [ ] None	   [ x]  Medication / TPN Administration     [ ] No Peripheral IV        S: post-op abthera drained 1 liter overnight of sanguinous drainage.     O: ICU Vital Signs Last 24 Hrs  T(F): 95.6 (06-01-23 @ 09:47), Max: 100.6 (05-31-23 @ 17:55)  HR: 109 (06-01-23 @ 11:00) (91 - 127)  BP: 126/63 (06-01-23 @ 10:29) (126/63 - 126/63)  BP(mean): 72 (06-01-23 @ 10:29) (72 - 72)  ABP: 111/58 (06-01-23 @ 11:00)  RR: 18 (06-01-23 @ 11:00) (16 - 24)  SpO2: 100% (06-01-23 @ 11:00) (96% - 100%)    PHYSICAL EXAM:   Neurological: sedated while paralyzed with nimbex.   ENT: mucus membrane moist  Cardiovascular: RRR  Respiratory: CTA  Gastrointestinal: soft, ND, abthera vac with area of clot above sponge. right CODIE serosang, left CODIE x 2 serosang, left hematoma CODIE (IR) dark bloody, and left pancreatic CODIE (IR)  dark bilious,   Extremities: warm, no dependent edema  Vascular: no cyanosis/erythema  Skin: no rashes  MSK: no joint swelling.     LABS:    06-01    135  |  104  |  34<H>  ----------------------------<  113<H>  3.6   |  23  |  0.87    Ca    7.2<L>      01 Jun 2023 05:30  Phos  4.1     06-01  Mg     2.2     06-01    TPro  4.2<L>  /  Alb  1.9<L>  /  TBili  1.4<H>  /  DBili  1.0<H>  /  AST  32  /  ALT  19  /  AlkPhos  80  06-01  LIVER FUNCTIONS - ( 01 Jun 2023 05:30 )  Alb: 1.9 g/dL / Pro: 4.2 g/dL / ALK PHOS: 80 U/L / ALT: 19 U/L / AST: 32 U/L / GGT: x                               8.6    11.57 )-----------( 52       ( 01 Jun 2023 05:30 )             24.7   PT/INR - ( 01 Jun 2023 05:30 )   PT: 15.2 sec;   INR: 1.27          PTT - ( 01 Jun 2023 05:30 )  PTT:35.7 secCARDIAC MARKERS ( 31 May 2023 07:59 )  x     / 0.15 ng/mL / 19 U/L / x     / 1.8 ng/mL    ABG - ( 01 Jun 2023 05:30 )  pH, Arterial: 7.43  pH, Blood: x     /  pCO2: 34    /  pO2: 178   / HCO3: 23    / Base Excess: -1.1  /  SaO2: 99.7            CAPILLARY BLOOD GLUCOSE      POCT Blood Glucose.: 144 mg/dL (31 May 2023 23:45)    MEDICATIONS  (STANDING):  acetylcysteine 20%  Inhalation 4 milliLiter(s) Inhalation every 6 hours  artificial  tears Solution 1 Drop(s) Both EYES two times a day  chlorhexidine 0.12% Liquid 15 milliLiter(s) Oral Mucosa every 12 hours  chlorhexidine 2% Cloths 1 Application(s) Topical daily  cisatracurium Infusion 3 MICROgram(s)/kG/Min (12.6 mL/Hr) IV Continuous <Continuous>  CRRT Treatment    <Continuous>  dextrose 50% Injectable 25 Gram(s) IV Push once  fentaNYL   Infusion... 0.5 MICROgram(s)/kG/Hr (1.75 mL/Hr) IV Continuous <Continuous>  glucagon  Injectable 1 milliGRAM(s) IntraMuscular once  lacosamide IVPB 250 milliGRAM(s) IV Intermittent every 12 hours  meropenem  IVPB 1000 milliGRAM(s) IV Intermittent every 8 hours  pantoprazole  Injectable 40 milliGRAM(s) IV Push daily  Parenteral Nutrition - Adult 1 Each (70 mL/Hr) TPN Continuous <Continuous>  Parenteral Nutrition - Adult 1 Each (70 mL/Hr) TPN Continuous <Continuous>  petrolatum Ophthalmic Ointment 1 Application(s) Both EYES daily  Phoxillum Filtration BK 4 / 2.5 5000 milliLiter(s) (1500 mL/Hr) CRRT <Continuous>  propofol Infusion 10 MICROgram(s)/kG/Min (4.2 mL/Hr) IV Continuous <Continuous>  sodium chloride 3%  Inhalation 4 milliLiter(s) Inhalation every 6 hours  tranexamic acid IVPB 1000 milliGRAM(s) IV Intermittent every 12 hours  vasopressin Infusion 0.04 Unit(s)/Min (6 mL/Hr) IV Continuous <Continuous>    MEDICATIONS  (PRN):  dextrose Oral Gel 15 Gram(s) Oral once PRN Blood Glucose LESS THAN 70 milliGRAM(s)/deciliter      Cantrell:	  [x ] None	[ ] Daily Cantrell Order Placed	   Indication:	  [ ] Strict I and O's    [ ] Obstruction     [ ] Incontinence + Stage 3 or 4 Decubitus  Central Line:  [ ] None	   [ x]  Medication / TPN Administration     [ ] No Peripheral IV

## 2023-06-01 NOTE — PRE-ANESTHESIA EVALUATION ADULT - NSANTHPMHFT_GEN_ALL_CORE
53 YO Male, HTN aortic dissection previous admission with severe cardiogenic shock and oliguric TREY requiring CVVHD. Presented to the ED with worsening epigastric pain CTA/P revealed continued endoleak hospital course complicated by necrotic/hemorrhagic pancreatitis s/p ex-lap yesterday with blood transfusions, intubated, sedated on Levo in ICU

## 2023-06-01 NOTE — PROCEDURE NOTE - NSPOSTPRCRAD_GEN_A_CORE
central line located in the/central line located in the superior vena cava/no pneumothorax/post-procedure radiography performed
CXR pending
CXR pending
central line located in the superior vena cava/no pneumothorax/post-procedure radiography performed

## 2023-06-01 NOTE — PROGRESS NOTE ADULT - SUBJECTIVE AND OBJECTIVE BOX
Patient brought to the OR again today; patient in OR at the time of rounding. Drain outputs as follows:    1. 16 Fr teal LUQ peripancreatic abscess "Left Pancreatic CODIE Drain" placed on 5/24:   -315 cc serosanguinous output in 24 hrs. 58 cc in previous 24 hrs.     2. 16 Fr teal LUQ hematoma "Left Abdominal Hematoma CODIE Drain" placed on 5/25 and upsized on 5/26:   -60 cc dark sanguinous output in 24 hrs. 260 cc in previous 24 hrs.

## 2023-06-02 LAB
ALBUMIN SERPL ELPH-MCNC: 2.3 G/DL — LOW (ref 3.3–5)
ALP SERPL-CCNC: 114 U/L — SIGNIFICANT CHANGE UP (ref 40–120)
ALT FLD-CCNC: 14 U/L — SIGNIFICANT CHANGE UP (ref 10–45)
ANION GAP SERPL CALC-SCNC: 10 MMOL/L — SIGNIFICANT CHANGE UP (ref 5–17)
ANION GAP SERPL CALC-SCNC: 7 MMOL/L — SIGNIFICANT CHANGE UP (ref 5–17)
ANION GAP SERPL CALC-SCNC: 8 MMOL/L — SIGNIFICANT CHANGE UP (ref 5–17)
APTT BLD: 31.4 SEC — SIGNIFICANT CHANGE UP (ref 27.5–35.5)
AST SERPL-CCNC: 23 U/L — SIGNIFICANT CHANGE UP (ref 10–40)
BILIRUB DIRECT SERPL-MCNC: 1 MG/DL — HIGH (ref 0–0.3)
BILIRUB INDIRECT FLD-MCNC: 0.4 MG/DL — SIGNIFICANT CHANGE UP (ref 0.2–1)
BILIRUB SERPL-MCNC: 1.5 MG/DL — HIGH (ref 0.2–1.2)
BUN SERPL-MCNC: 30 MG/DL — HIGH (ref 7–23)
BUN SERPL-MCNC: 31 MG/DL — HIGH (ref 7–23)
BUN SERPL-MCNC: 33 MG/DL — HIGH (ref 7–23)
CALCIUM SERPL-MCNC: 7.6 MG/DL — LOW (ref 8.4–10.5)
CALCIUM SERPL-MCNC: 8 MG/DL — LOW (ref 8.4–10.5)
CALCIUM SERPL-MCNC: 8 MG/DL — LOW (ref 8.4–10.5)
CHLORIDE SERPL-SCNC: 102 MMOL/L — SIGNIFICANT CHANGE UP (ref 96–108)
CHLORIDE SERPL-SCNC: 103 MMOL/L — SIGNIFICANT CHANGE UP (ref 96–108)
CHLORIDE SERPL-SCNC: 104 MMOL/L — SIGNIFICANT CHANGE UP (ref 96–108)
CO2 SERPL-SCNC: 22 MMOL/L — SIGNIFICANT CHANGE UP (ref 22–31)
CO2 SERPL-SCNC: 25 MMOL/L — SIGNIFICANT CHANGE UP (ref 22–31)
CO2 SERPL-SCNC: 25 MMOL/L — SIGNIFICANT CHANGE UP (ref 22–31)
CREAT SERPL-MCNC: 0.8 MG/DL — SIGNIFICANT CHANGE UP (ref 0.5–1.3)
CREAT SERPL-MCNC: 0.9 MG/DL — SIGNIFICANT CHANGE UP (ref 0.5–1.3)
CREAT SERPL-MCNC: 0.92 MG/DL — SIGNIFICANT CHANGE UP (ref 0.5–1.3)
EGFR: 101 ML/MIN/1.73M2 — SIGNIFICANT CHANGE UP
EGFR: 105 ML/MIN/1.73M2 — SIGNIFICANT CHANGE UP
EGFR: 99 ML/MIN/1.73M2 — SIGNIFICANT CHANGE UP
FIBRINOGEN PPP-MCNC: 325 MG/DL — SIGNIFICANT CHANGE UP (ref 200–445)
GLUCOSE SERPL-MCNC: 111 MG/DL — HIGH (ref 70–99)
GLUCOSE SERPL-MCNC: 115 MG/DL — HIGH (ref 70–99)
GLUCOSE SERPL-MCNC: 89 MG/DL — SIGNIFICANT CHANGE UP (ref 70–99)
HCT VFR BLD CALC: 22.7 % — LOW (ref 39–50)
HCT VFR BLD CALC: 26.4 % — LOW (ref 39–50)
HGB BLD-MCNC: 7.7 G/DL — LOW (ref 13–17)
HGB BLD-MCNC: 9.2 G/DL — LOW (ref 13–17)
INR BLD: 1.17 — HIGH (ref 0.88–1.16)
MAGNESIUM SERPL-MCNC: 2.2 MG/DL — SIGNIFICANT CHANGE UP (ref 1.6–2.6)
MAGNESIUM SERPL-MCNC: 2.2 MG/DL — SIGNIFICANT CHANGE UP (ref 1.6–2.6)
MAGNESIUM SERPL-MCNC: 2.3 MG/DL — SIGNIFICANT CHANGE UP (ref 1.6–2.6)
MCHC RBC-ENTMCNC: 29.6 PG — SIGNIFICANT CHANGE UP (ref 27–34)
MCHC RBC-ENTMCNC: 29.8 PG — SIGNIFICANT CHANGE UP (ref 27–34)
MCHC RBC-ENTMCNC: 33.9 GM/DL — SIGNIFICANT CHANGE UP (ref 32–36)
MCHC RBC-ENTMCNC: 34.8 GM/DL — SIGNIFICANT CHANGE UP (ref 32–36)
MCV RBC AUTO: 85.4 FL — SIGNIFICANT CHANGE UP (ref 80–100)
MCV RBC AUTO: 87.3 FL — SIGNIFICANT CHANGE UP (ref 80–100)
NRBC # BLD: 0 /100 WBCS — SIGNIFICANT CHANGE UP (ref 0–0)
NRBC # BLD: 0 /100 WBCS — SIGNIFICANT CHANGE UP (ref 0–0)
PHOSPHATE SERPL-MCNC: 4.2 MG/DL — SIGNIFICANT CHANGE UP (ref 2.5–4.5)
PHOSPHATE SERPL-MCNC: 4.2 MG/DL — SIGNIFICANT CHANGE UP (ref 2.5–4.5)
PHOSPHATE SERPL-MCNC: 4.6 MG/DL — HIGH (ref 2.5–4.5)
PLATELET # BLD AUTO: 46 K/UL — LOW (ref 150–400)
PLATELET # BLD AUTO: 69 K/UL — LOW (ref 150–400)
POTASSIUM SERPL-MCNC: 4.1 MMOL/L — SIGNIFICANT CHANGE UP (ref 3.5–5.3)
POTASSIUM SERPL-MCNC: 4.2 MMOL/L — SIGNIFICANT CHANGE UP (ref 3.5–5.3)
POTASSIUM SERPL-MCNC: 4.4 MMOL/L — SIGNIFICANT CHANGE UP (ref 3.5–5.3)
POTASSIUM SERPL-SCNC: 4.1 MMOL/L — SIGNIFICANT CHANGE UP (ref 3.5–5.3)
POTASSIUM SERPL-SCNC: 4.2 MMOL/L — SIGNIFICANT CHANGE UP (ref 3.5–5.3)
POTASSIUM SERPL-SCNC: 4.4 MMOL/L — SIGNIFICANT CHANGE UP (ref 3.5–5.3)
PREALB SERPL-MCNC: 8 MG/DL — LOW (ref 20–40)
PROT SERPL-MCNC: 5 G/DL — LOW (ref 6–8.3)
PROTHROM AB SERPL-ACNC: 14 SEC — HIGH (ref 10.5–13.4)
RBC # BLD: 2.6 M/UL — LOW (ref 4.2–5.8)
RBC # BLD: 3.09 M/UL — LOW (ref 4.2–5.8)
RBC # FLD: 15.9 % — HIGH (ref 10.3–14.5)
RBC # FLD: 15.9 % — HIGH (ref 10.3–14.5)
SODIUM SERPL-SCNC: 134 MMOL/L — LOW (ref 135–145)
SODIUM SERPL-SCNC: 135 MMOL/L — SIGNIFICANT CHANGE UP (ref 135–145)
SODIUM SERPL-SCNC: 137 MMOL/L — SIGNIFICANT CHANGE UP (ref 135–145)
WBC # BLD: 10.55 K/UL — HIGH (ref 3.8–10.5)
WBC # BLD: 14.41 K/UL — HIGH (ref 3.8–10.5)
WBC # FLD AUTO: 10.55 K/UL — HIGH (ref 3.8–10.5)
WBC # FLD AUTO: 14.41 K/UL — HIGH (ref 3.8–10.5)

## 2023-06-02 PROCEDURE — 90945 DIALYSIS ONE EVALUATION: CPT

## 2023-06-02 PROCEDURE — 99232 SBSQ HOSP IP/OBS MODERATE 35: CPT

## 2023-06-02 PROCEDURE — 71045 X-RAY EXAM CHEST 1 VIEW: CPT | Mod: 26

## 2023-06-02 PROCEDURE — 99233 SBSQ HOSP IP/OBS HIGH 50: CPT | Mod: GC

## 2023-06-02 RX ORDER — FENTANYL CITRATE 50 UG/ML
0.5 INJECTION INTRAVENOUS
Qty: 5000 | Refills: 0 | Status: DISCONTINUED | OUTPATIENT
Start: 2023-06-02 | End: 2023-06-05

## 2023-06-02 RX ORDER — ALBUMIN HUMAN 25 %
50 VIAL (ML) INTRAVENOUS EVERY 8 HOURS
Refills: 0 | Status: DISCONTINUED | OUTPATIENT
Start: 2023-06-02 | End: 2023-06-02

## 2023-06-02 RX ORDER — ELECTROLYTE SOLUTION,INJ
1 VIAL (ML) INTRAVENOUS
Refills: 0 | Status: DISCONTINUED | OUTPATIENT
Start: 2023-06-02 | End: 2023-06-02

## 2023-06-02 RX ORDER — FUROSEMIDE 40 MG
80 TABLET ORAL ONCE
Refills: 0 | Status: COMPLETED | OUTPATIENT
Start: 2023-06-02 | End: 2023-06-02

## 2023-06-02 RX ORDER — I.V. FAT EMULSION 20 G/100ML
0.7 EMULSION INTRAVENOUS
Qty: 49 | Refills: 0 | Status: DISCONTINUED | OUTPATIENT
Start: 2023-06-02 | End: 2023-06-02

## 2023-06-02 RX ORDER — SODIUM CHLORIDE 9 MG/ML
500 INJECTION, SOLUTION INTRAVENOUS ONCE
Refills: 0 | Status: DISCONTINUED | OUTPATIENT
Start: 2023-06-02 | End: 2023-06-02

## 2023-06-02 RX ORDER — DEXMEDETOMIDINE HYDROCHLORIDE IN 0.9% SODIUM CHLORIDE 4 UG/ML
0.2 INJECTION INTRAVENOUS
Qty: 200 | Refills: 0 | Status: DISCONTINUED | OUTPATIENT
Start: 2023-06-02 | End: 2023-06-02

## 2023-06-02 RX ORDER — I.V. FAT EMULSION 20 G/100ML
0.71 EMULSION INTRAVENOUS
Qty: 50 | Refills: 0 | Status: DISCONTINUED | OUTPATIENT
Start: 2023-06-02 | End: 2023-06-02

## 2023-06-02 RX ORDER — DEXMEDETOMIDINE HYDROCHLORIDE IN 0.9% SODIUM CHLORIDE 4 UG/ML
0.2 INJECTION INTRAVENOUS
Qty: 400 | Refills: 0 | Status: DISCONTINUED | OUTPATIENT
Start: 2023-06-02 | End: 2023-06-05

## 2023-06-02 RX ADMIN — CHLORHEXIDINE GLUCONATE 15 MILLILITER(S): 213 SOLUTION TOPICAL at 05:50

## 2023-06-02 RX ADMIN — Medication 80 MILLIGRAM(S): at 16:54

## 2023-06-02 RX ADMIN — Medication 1 DROP(S): at 06:57

## 2023-06-02 RX ADMIN — SODIUM CHLORIDE 4 MILLILITER(S): 9 INJECTION INTRAMUSCULAR; INTRAVENOUS; SUBCUTANEOUS at 09:37

## 2023-06-02 RX ADMIN — Medication 50 MILLILITER(S): at 11:26

## 2023-06-02 RX ADMIN — LACOSAMIDE 150 MILLIGRAM(S): 50 TABLET ORAL at 06:44

## 2023-06-02 RX ADMIN — CHLORHEXIDINE GLUCONATE 1 APPLICATION(S): 213 SOLUTION TOPICAL at 17:08

## 2023-06-02 RX ADMIN — CHLORHEXIDINE GLUCONATE 15 MILLILITER(S): 213 SOLUTION TOPICAL at 17:08

## 2023-06-02 RX ADMIN — MEROPENEM 100 MILLIGRAM(S): 1 INJECTION INTRAVENOUS at 22:33

## 2023-06-02 RX ADMIN — LACOSAMIDE 150 MILLIGRAM(S): 50 TABLET ORAL at 17:58

## 2023-06-02 RX ADMIN — Medication 4 MILLILITER(S): at 03:10

## 2023-06-02 RX ADMIN — SODIUM CHLORIDE 4 MILLILITER(S): 9 INJECTION INTRAMUSCULAR; INTRAVENOUS; SUBCUTANEOUS at 21:04

## 2023-06-02 RX ADMIN — Medication 4 MILLILITER(S): at 15:33

## 2023-06-02 RX ADMIN — Medication 4 MILLILITER(S): at 21:03

## 2023-06-02 RX ADMIN — Medication 1 EACH: at 18:41

## 2023-06-02 RX ADMIN — Medication 1 DROP(S): at 17:09

## 2023-06-02 RX ADMIN — MEROPENEM 100 MILLIGRAM(S): 1 INJECTION INTRAVENOUS at 14:08

## 2023-06-02 RX ADMIN — PANTOPRAZOLE SODIUM 40 MILLIGRAM(S): 20 TABLET, DELAYED RELEASE ORAL at 11:22

## 2023-06-02 RX ADMIN — Medication 1 APPLICATION(S): at 14:52

## 2023-06-02 RX ADMIN — Medication 4 MILLILITER(S): at 09:37

## 2023-06-02 RX ADMIN — SODIUM CHLORIDE 4 MILLILITER(S): 9 INJECTION INTRAMUSCULAR; INTRAVENOUS; SUBCUTANEOUS at 03:10

## 2023-06-02 RX ADMIN — I.V. FAT EMULSION 20.8 GM/KG/DAY: 20 EMULSION INTRAVENOUS at 18:43

## 2023-06-02 RX ADMIN — SODIUM CHLORIDE 4 MILLILITER(S): 9 INJECTION INTRAMUSCULAR; INTRAVENOUS; SUBCUTANEOUS at 15:34

## 2023-06-02 RX ADMIN — PROPOFOL 4.2 MICROGRAM(S)/KG/MIN: 10 INJECTION, EMULSION INTRAVENOUS at 05:51

## 2023-06-02 RX ADMIN — MEROPENEM 100 MILLIGRAM(S): 1 INJECTION INTRAVENOUS at 05:50

## 2023-06-02 NOTE — PROGRESS NOTE ADULT - ASSESSMENT
53yMale w/ PMHx of HTN, type A aortic dissection s/p Dacron grafts, AV resuspension in 2013, CAD s/p CABG x 1 SVG to RCA (5/2013 with Dr. Medina), seizure disorder, recent admission 3/20-4/14 for replacement of transverse aortic arch, second stage TEVAR and aorto-axillary bypass, AV replacement (bio 23mm), and CABG x 1 (SVG-RCA). Pt found to have endo leak and taken to OR for TEVAR revision on 5/5, Post op course c/b Klebsiella bacteremia (5/15), resp failure requiring intubation on 5/18, Mucus plugging s/p Bronch 5/19 and PEA arrest. Post operatively pt also found to have hemorrhagic pancreatitis with venu-pancreatic abscess possibly 2* to Depakote therefore s/p IR aspiration of peripancreatic fluid collection and paracentesis on 5/22, IR venu-pancreatic abscess drainage and placement of drainage catheter on 5/24. Pt then transferred from CTICU to SICU for further mgmt of hemorrhagic pancreatitis on 5/25. s/p IR placement of 2 new drainage catheters and catheter exchange on 5/26. LUQ drainage 5/30. OR 5/31 exlap washout & replacement of 3 new JPs and abthera vac with open abdomen. RTOR 6/1, no bleeding, evac of old blood, placement of feeding J-tube.     NEURO: Fentanyl gtts, Propofol gtt.. Hx seizures: epilepsy recs appreciated, Cont vimpat. Depakote weaned off due to concerns for drug-related hemorrhagic pancreatitis.   HEENT: Artificial tears  CV: s/p PEA arrest on 5/24 night, Septic shock: on Vaso 0.04u, but off levo since last night. off pressors, Echo from 5/19 hyperdynamic LV, SHAJI with LVOTO (gradient 26), normal RV, mild MR, no pulm htn. Cont ASA 81mg.    PULM: intubated 5/13 on AC 40/500/22/8, ARDS resolved - off NO, s/p multiple Mucus plug/ Lung collapse s/p bronch x3 (most recently on 5/26) most likely from outward obstruction: Cont rotating pt around the clock. Cont Duonebs, Mucomyst, 3% saline  GI/FEN: NPO on TPN, hold lipids while on propofol. holding Trickle TF Nepro, OGT LIWS, feeding J-tube to gravity, Protonix BID, Hemorrhagic pancreatitis: s/p Multiple Drain placements and paracentisis by IR team.  FOBT+ 5/29. Constipation: resolved, now diarrhea: Cont Rectal tube, abd open with abthera vac. pending RTOR today.   : TREY on CVVHD - continue with net NEGATIVE. Nephro following. Cantrell d/c'ed 5/26 - Continue bladder scan daily for poss straight cath.   HEME: Acute blood loss anemia on 5/12 requiring 11units of PRBC. IR negative for mesenteric extrav. s/p 5u pPRBC since transfer to SICU, 5/31: RBC 7U, 4 PLT, 1 FFP.  intraabdominal bleed since OR, s/p 1U PRBC intraop and 1U PRBC and 1u plt this morning - HSQ held. on TXA BID (5/31-6/1),1U PRBC & 2u PLT 6/1  ENDO: mISS  ID: Chloe (5/21-), ID following. Kleb bacteremia from 5/15 & 5/17, subsequent bld cx negative, /// DC: Caspo (5/23-5/30), Ampicillin ( 5/21- 5/27). PNA w/ bronch cx kleb, enterobacter (zosyn, erta resistant), E faecalis, strep angionosus from 5/19, pancreatic abscess cx pan-sensitive kleb 5/22.   PPX: SCDs, SQH on hold.   LINES: L rad kalpana (5/31--), Lsubclav HD Cath (5/31--), RIJ TLC (6/1--) LIJ TLC (5/23--) // DC: RIJ TLC (5/22-5/27), RIJ HD cath (5/22-31), R Ax kalpana(5/12-5/31)  WOUNDS/DRAINS: right OR CODIE, 2 left OR CODIE, left IR hematoma drain, left IR pancreatic drain (bilious).   PT: PT/OR ordered on 5/28, re-order when off sedation.   Dispo: SICU   53yMale w/ PMHx of HTN, type A aortic dissection s/p Dacron grafts, AV resuspension in 2013, CAD s/p CABG x 1 SVG to RCA (5/2013 with Dr. Medina), seizure disorder, recent admission 3/20-4/14 for replacement of transverse aortic arch, second stage TEVAR and aorto-axillary bypass, AV replacement (bio 23mm), and CABG x 1 (SVG-RCA). Pt found to have endo leak and taken to OR for TEVAR revision on 5/5, Post op course c/b Klebsiella bacteremia (5/15), resp failure requiring intubation on 5/18, Mucus plugging s/p Bronch 5/19 and PEA arrest. Post operatively pt also found to have hemorrhagic pancreatitis with venu-pancreatic abscess possibly 2* to Depakote therefore s/p IR aspiration of peripancreatic fluid collection and paracentesis on 5/22, IR venu-pancreatic abscess drainage and placement of drainage catheter on 5/24. Pt then transferred from CTICU to SICU for further mgmt of hemorrhagic pancreatitis on 5/25. s/p IR placement of 2 new drainage catheters and catheter exchange on 5/26. LUQ drainage 5/30. OR 5/31 exlap washout & replacement of 3 new JPs and abthera vac with open abdomen. RTOR 6/1, no bleeding, evac of old blood, placement of feeding J-tube.     NEURO: Wean sedation: Fentanyl gtts, Precedex gtt. Hx seizures: epilepsy recs appreciated, Cont vimpat. Depakote weaned off due to concerns for drug-related hemorrhagic pancreatitis.   HEENT: Artificial tears  CV: Euvolemic on exam, CVP 5, s/p PEA arrest on 5/24 night, Septic shock: Off Vaso and levo. Echo from 5/19 hyperdynamic LV, SHAJI with LVOTO (gradient 26), normal RV, mild MR, no pulm htn. ASA 81mg held 2/2 to acute blood loss anemia. Albumin 25%q8 for 3 days.  PULM: intubated now tolerating CPAP, ARDS resolved - off NO, s/p multiple Mucus plug/ Lung collapse s/p bronch x3 (most recently on 5/26) most likely from outward obstruction: Cont rotating pt around the clock. Cont Duonebs, Mucomyst, 3% saline  GI/FEN: NPO on TPN, hold lipids while on propofol. holding Trickle TF Nepro, OGT LIWS, feeding J-tube to gravity, Protonix BID, Hemorrhagic pancreatitis: s/p Multiple Drain placements and paracentisis by IR team.  FOBT+ 5/29. Constipation: resolved, now diarrhea: Cont Rectal tube  : TREY on CVVHD - continue with net NEGATIVE. Nephro following. Cantrell d/c'ed 5/26 - Continue bladder scan daily for poss straight cath.   HEME: Acute blood loss anemia on 5/12 requiring 11units of PRBC. IR negative for mesenteric extrav. s/p 5u pPRBC since transfer to SICU, 5/31: RBC 7U, 4 PLT, 1 FFP.  intraabdominal bleed since OR, s/p 1U PRBC intraop and 1U PRBC and 1u plt this morning - HSQ held. D/C TXA BID (5/31-6/1),1U PRBC & 2u PLT 6/1  ENDO: mISS  ID: Chloe (5/21-), ID following. Kleb bacteremia from 5/15 & 5/17, subsequent bld cx negative, /// DC: Caspo (5/23-5/30), Ampicillin ( 5/21- 5/27). PNA w/ bronch cx kleb, enterobacter (zosyn, erta resistant), E faecalis, strep angionosus from 5/19, pancreatic abscess cx pan-sensitive kleb 5/22.   PPX: SCDs, SQH on hold.   LINES: L rad kalpana (5/31--), Lsubclav HD Cath (5/31--), RIJ TLC (6/1--) LIJ TLC (5/23--) // DC: RIJ TLC (5/22-5/27), RIJ HD cath (5/22-31), R Ax kalpana(5/12-5/31)  WOUNDS/DRAINS: right OR CODIE, 2 left OR CODIE, left IR hematoma drain, left IR pancreatic drain (bilious).   PT: PT/OR ordered on 5/28, re-order when off sedation.   Dispo: SICU

## 2023-06-02 NOTE — PROGRESS NOTE ADULT - ATTENDING COMMENTS
CAD, seizures, AVR s/p TEVAR second stage c/b hemorrhagic pancreatitis, Klebsiella peritonitis/pna/bacteremia, AHRF, ATN, hemorrhagic shock  physical as above  H/H stable  keep platelets over 20-30K  continue CVVH, some increase in UO noted  lasix trial  off pressors  continue meropenem and ampicillin  TPN  aim for RASS 0 to -1  trial of CPAP, may need trach if fails  decision making of high complexity

## 2023-06-02 NOTE — CHART NOTE - NSCHARTNOTEFT_GEN_A_CORE
Admitting Diagnosis:   Patient is a 53y old  Male who presents with a chief complaint of endoleak, abdominal pain (2023 12:44)  PAST MEDICAL & SURGICAL HISTORY:  HTN (hypertension)  Aortic dissection  CAD (coronary artery disease)  Seizure disorder  Seizure disorder  Hypertension  Status post endovascular aneurysm repair (EVAR)  S/P aortic bifurcation bypass graft  S/P CABG x 1    Current Nutrition Order: NPO   TPN: 368g Dex, 105g amino acids, 50g lipid emulsion to provide 2171.2kcal, 105g protein, GIR of 3.65, 1.5gprotein/kg ABW     PO Intake: Good (%) [   ]  Fair (50-75%) [   ] Poor (<25%) [   ] *NPO [ x ]     GI Issues: no c/o N/V, LLQ, low-pitched bowel sounds    Pain: nonverbal indicators of pain absent    Skin Integrity: severe, pitting edema 4+ noted to Bilateral arms, wrists, legs, knees, ankles; DTI to coccyx, stage 2 PU to sacrum; Sandro: 11    Labs:       135  |  103  |  30<H>  ----------------------------<  111<H>  4.2   |  25  |  0.80    Ca    7.6<L>      2023 12:55  Phos  4.2     06-  Mg     2.3     -    TPro  5.0<L>  /  Alb  2.3<L>  /  TBili  1.5<H>  /  DBili  1.0<H>  /  AST  23  /  ALT  14  /  AlkPhos  114  -    CAPILLARY BLOOD GLUCOSE    Medications:  MEDICATIONS  (STANDING):  acetylcysteine 20%  Inhalation 4 milliLiter(s) Inhalation every 6 hours  artificial  tears Solution 1 Drop(s) Both EYES two times a day  chlorhexidine 0.12% Liquid 15 milliLiter(s) Oral Mucosa every 12 hours  chlorhexidine 2% Cloths 1 Application(s) Topical daily  chlorhexidine 4% Liquid 1 Application(s) Topical <User Schedule>  dexMEDEtomidine Infusion 0.2 MICROgram(s)/kG/Hr (3.5 mL/Hr) IV Continuous <Continuous>  dextrose 50% Injectable 25 Gram(s) IV Push once  fentaNYL   Infusion... 0.5 MICROgram(s)/kG/Hr (1.75 mL/Hr) IV Continuous <Continuous>  glucagon  Injectable 1 milliGRAM(s) IntraMuscular once  lacosamide IVPB 250 milliGRAM(s) IV Intermittent every 12 hours  lipid, fat emulsion (Fish Oil and Plant Based) 20% Infusion 0.7143 Gm/kG/Day (20.8 mL/Hr) IV Continuous <Continuous>  meropenem  IVPB 1000 milliGRAM(s) IV Intermittent every 8 hours  pantoprazole  Injectable 40 milliGRAM(s) IV Push daily  Parenteral Nutrition - Adult 1 Each (70 mL/Hr) TPN Continuous <Continuous>  Parenteral Nutrition - Adult 1 Each (70 mL/Hr) TPN Continuous <Continuous>  petrolatum Ophthalmic Ointment 1 Application(s) Both EYES daily  sodium chloride 3%  Inhalation 4 milliLiter(s) Inhalation every 6 hours    MEDICATIONS  (PRN):  dextrose Oral Gel 15 Gram(s) Oral once PRN Blood Glucose LESS THAN 70 milliGRAM(s)/deciliter  sodium chloride 0.9% lock flush 10 milliLiter(s) IV Push every 1 hour PRN Pre/post blood products, medications, blood draw, and to maintain line patency    Weight:  6'0''  pounds+-10%   Wt 154 pounds BMI 20.9 %IBW87    Weight Change: Based on most recent EMR wt     Estimated energy needs:   current body wt used for energy calculations as pt falls within % IBW  adjust for age, post op needs, pancreatitis, pressure ulcer, ICU level of care, Vent, plan for CVVHD HD   25-30kcal/k-2094kcal/day   1.5-2.0gm/k-140gm prot/day     Subjective: 53yr old male with PMH HTN, type A dissection sp Dacron grafts and AV resuspension (), CAD sp CABG x 1 ([facility], 2013, SVG-RCA), seizures, admitted for surgical mgmt of progression of aneurysmal disease. Recent admission 3/20- during which patient underwent replacement of transverse aortic arch, second stage thoracic endovascular aortic repair, aorto-axillary bypass, AV replacement (bio 23mm), and CABG x 1 (SVG-RCA) EF 75% (Ignacio, 3/20). Post op cb lactic acidosis, severe cardiogenic and vasogenic shock, TREY requiring CVVHD and temporary dialysis requirement. Recent admission - for seizure episode, were his AEDS were adjusted. Presented to [place] ED  for epigastric pain. CT exam which showed known endoleak for which pt was tx to [facility]. Patient underwent TEVAR with Dr. Pierre , LD placed prior by NSGY. Arrived intubated on propofol.  extubated. 1 unit pRBC, CTH performed for decreased LE mvmt. Improved later in day.  LD clamped and patient ambulated, plan for dc.  US showing groin seroma and hematoma. CT scan showing necrotizing pancreatitis. Zosyn started and Gen surg consulted. Recommended IR and GI consults and further evaluation with imaging. Gi believes 2/2 insults from surgeries back in March and recommended outpatient fu.  new RIJ and PA catheter placed, R pigtail placed cb pneumothorax.  gen surg signed off. Repeat imaging showing concern for possible malignancy in pancreatic tail. Radiology attending called during day to say findings likely 2/2 pancreatitis and recommended outpatient contrast study. Around 7pm patient had questionable seizure-like episode. Given 2mg ativan. Labs sig for Hb drop to ~6. TREY with oliguria. Stat CT showing [id number] hematoma, mod-large ascites with layering in pelvis. Given 4 units pRBC and sent to IR for possible intervention.  returned from Ir intubated, no arterial bleeder identified therefore no intervention performed. Hb ~8, gen surg recommending additional transfusion.  Total transfusion 7 pRBC, 2 cryo, 2 FFP, 1 plt. Was given additional blood overnight (1 pRBC). Gen surg on board.  1 unit pRBC during day for episode of hypotension. 1 episode of seizure resolved with propofol push. Vimpat dose adjusted per neurology. Given 2 FFP and 1 plt. 5/15 discussed with neurology possibilty of depakote induced pancreatitis-dose being adjusted. 5/15 no changes, depakote level adjusted, eeg neg. New line placed 5/15. Pt with concern for clinical picture of SIRS/early sepsis  - prompting imaging and initiation of presumptive Abx. CT  showed pancreatic necrosis with loculations, slightly improved LLQ hematoma without gas to suggest superinfeciton. Primacor titrated off  and lopressor dosings given with improvement in tachycardia, now on esmolol infusion. S/p Bronch , ; xray improved after white out in AM. Nephrology consulted for CVVHD. : CVVHD started for TREY with Oliguria. : Paracentesis: 4L removed, LUQ aspiration (no drain at the time) - growing Kleb. : PEA arrest overnight after a turn. Dobutamine initiated. : Paracenthesis 1.6L. Upsizing of LUQ drain, placement of new drain, paracentesis with 2 JPs also placed (4 total). Tx to SICU. : CT CAP:Left retroperitoneal hematoma minimally improved from . Vaso off, Levo started during IR procedure now weaning off.  Switched to Propofol from Versed. Labs post op: Hgb stable LA 1.8. Fibrinogen 325.  DC coulter, & Bladder scan Daily. CVVHD:started @5pm f/u CVVHD labs @[place]. ON: Hgb 6.4 (8.0). 1 unit PRBCs given. Post transfusion 7.5. Reglan. : prealb 9. Bladder scan 200 however SCath 50. ETT adjusted for Leak CXR: @leeanne therefore pulled back 2cm and repeat CXR wnl. Given propofol for ETT adjustement, weaned off sedation Tolerating CPAP. Responding to Commands stating yes and no appropriately but generalized weakness throughout ordered OT/PT. Tolerating CPAP 40%. started trickle feeds. As per Renal: Recc No IV contrast . Started Dulcolax supp for Constipation. Ordered Second unit of PRBC and repeat CBC@6PM. Goal 500 neg and weaning off Levo after 2UPRBC. Bld cx's NGTD. Back on levo. Hgb 9.1 s/p blood. : TPN reordered. Increase Trickle feeds to Goal and Change to Doboff.    Pt seen for nutrition consult for EN recs. Pt utilizing CPAP with PS. Pt's medical team weaned sedation to precedex/fentantyl. Per team/nursing, pt has been tolerating CPAP all day, but is weak. Off Vasopressin. MAP 89. Pt continues on TPN, providing all of pt's nutrient needs, NPO status as well. Discussed with team-plan is to attempt trickle feeds starting low and slowly at 6/3/23. No noted N/V, no nonverbal indications of pain. Severe pitting edema remains, pressure ulcers remain. Labs reviewed: elevated BUN (30), serum Glucose (111), TBili (1.5), RD to continue to monitor trends. See nutrition recommendations below.    Previous Nutrition Diagnosis: Increased nutrients RT increased demands AEB Vent     Active [ x ]  Resolved [   ]    Goal: Pt to meet at least 75% of nutritional needs consistently via most feasible route    Recommendations:  1. Continue TPN of 368g Dex, 105g amino acids, 50g lipid emulsion to provide 2171.2kcal, 105g protein, GIR of 3.65, 1.5gprotein/kg ABW  >>trickle feeds as tolerated per medical team (RD discussed nutrition recommendations/plan with medical team)   2. Pain/GI per team.  3. Labs: monitor BMP, CBC, glucose, lytes, trend renal indices, LFTs, POCT, lipids/TG, lactate, Amylase and Lipase; MAP.  4. Monitor GI function, weights as able, chemistry/labs  5. Align nutrition with goals of care at all times   6. RD to remain available for additional nutrition interventions as needed    Education: N/A; deferred at this time    Risk Level: High [  x ] Moderate [   ] Low [   ].

## 2023-06-02 NOTE — ADVANCED PRACTICE NURSE CONSULT - ASSESSMENT
Evolving DTPI on coccyx measuring 4.5 cm x 1 cm with small amount of serosanguinous exudate. Right foot lateral aspect 2 DTPIs, total measurement for bot pressure injuries 1 cm x 0.5 cm.   Patient being turned and repositioned with positioning pillows. Bilateral heel protectors on to offload heels. WOCN unable to complete skin assessment due to difficulty maintaining patient on his side without positioning pillow. WOCN will follow up to assess skin.  Evolving DTPI on sacrum/coccyx measuring 4.5 cm x 1 cm with small amount of serosanguinous exudate. Right foot lateral aspect 2 DTPIs, total measurement for bot pressure injuries 1 cm x 0.5 cm.   Patient being turned and repositioned with positioning pillows. Bilateral heel protectors on to offload heels. WOCN unable to complete skin assessment due to difficulty maintaining patient on his side without positioning pillow. WOCN will follow up to assess skin.  Evolving DTPI on sacrum/coccyx measuring 4.5 cm x 1 cm with small amount of serosanguinous exudate. Right foot lateral aspect 2 DTPIs, total measurement for both pressure injuries 1 cm x 0.5 cm.   Patient being turned and repositioned with positioning pillows. Bilateral heel protectors on to offload heels. WOCN unable to complete skin assessment due to difficulty maintaining patient on his side without positioning pillow. WOCN will follow up to assess skin.

## 2023-06-02 NOTE — PROGRESS NOTE ADULT - SUBJECTIVE AND OBJECTIVE BOX
Patient is a 53y Male seen and evaluated at bedside on CVVHD patient off of pressor support went back to the OR yesterday and not a lot of bleeding was visualized- UF rate increased to net negative 200ml/hour uop 300cc/24 hours /63 hgb 9.2 Na 134 K 4.4       Meds:    acetylcysteine 20%  Inhalation 4 every 6 hours  artificial  tears Solution 1 two times a day  chlorhexidine 0.12% Liquid 15 every 12 hours  chlorhexidine 2% Cloths 1 daily  chlorhexidine 4% Liquid 1 <User Schedule>  CRRT Treatment  <Continuous>  dextrose 50% Injectable 25 once  dextrose Oral Gel 15 once PRN  fentaNYL   Infusion... 0.5 <Continuous>  glucagon  Injectable 1 once  lacosamide IVPB 250 every 12 hours  meropenem  IVPB 1000 every 8 hours  pantoprazole  Injectable 40 daily  Parenteral Nutrition - Adult 1 <Continuous>  petrolatum Ophthalmic Ointment 1 daily  Phoxillum Filtration BK 4 / 2.5 5000 <Continuous>  propofol Infusion 10 <Continuous>  sodium chloride 0.9% lock flush 10 every 1 hour PRN  sodium chloride 3%  Inhalation 4 every 6 hours  vasopressin Infusion 0.02 <Continuous>      T(C): , Max: 37 (06-02-23 @ 04:57)  T(F): , Max: 98.6 (06-02-23 @ 04:57)  HR: 117 (06-02-23 @ 06:00)  BP: 126/63 (06-01-23 @ 10:29)  BP(mean): 72 (06-01-23 @ 10:29)  RR: 15 (06-02-23 @ 06:00)  SpO2: 100% (06-02-23 @ 06:00)  Wt(kg): --    06-01 @ 07:01  -  06-02 @ 07:00  --------------------------------------------------------  IN: 2238.3 mL / OUT: 4304 mL / NET: -2065.7 mL    06-02 @ 07:01  -  06-02 @ 08:34  --------------------------------------------------------  IN: 93.3 mL / OUT: 0 mL / NET: 93.3 mL      Height (cm): 182.9 (06-01 @ 10:29)  Weight (kg): 70 (06-01 @ 10:29)  BMI (kg/m2): 20.9 (06-01 @ 10:29)  BSA (m2): 1.91 (06-01 @ 10:29)    Review of Systems:  unable to participate       PHYSICAL EXAM:  GENERAL: intubated on 40% fi02  CHEST/LUNG: mechanical breath sounds ; no accessory muscle use   HEART: normal S1S2, RRR  ABDOMEN: Soft, Nontender, +BS,   EXTREMITIES: + edema BL LE UE   SKIN: no lesions or rashes   ACCESS: L subclavian HD cath (placed 5/31)      LABS:                        9.2    14.41 )-----------( 69       ( 02 Jun 2023 05:06 )             26.4     06-02    134<L>  |  102  |  33<H>  ----------------------------<  115<H>  4.4   |  22  |  0.92    Ca    8.0<L>      02 Jun 2023 05:06  Phos  4.6     06-02  Mg     2.2     06-02    TPro  5.0<L>  /  Alb  2.3<L>  /  TBili  1.5<H>  /  DBili  1.0<H>  /  AST  23  /  ALT  14  /  AlkPhos  114  06-02      PT/INR - ( 02 Jun 2023 05:06 )   PT: 14.0 sec;   INR: 1.17          PTT - ( 02 Jun 2023 05:06 )  PTT:31.4 sec          RADIOLOGY & ADDITIONAL STUDIES:

## 2023-06-02 NOTE — PROGRESS NOTE ADULT - ASSESSMENT
53 YO Male, HTN aortic dissection previous admission with severe cardiogenic shock and oliguric TREY requiring CVVHD. Presented to the ED with worsening epigastric pain CTA/P revealed continued endoleak hospital course complicated by necrotic/hemorrhagic pancreatitis      #oligoanuric ATN 2/2 cardiogenic shock  #necrotizing pancreatitis        recommend:  patient remains oliguric with significant volume overload   will continue with CVVHD  qb 300ml/min DFR 1.5L/hour net negative 200ml/hour  q8H BMP while on CVVHD  Check phos daily  maintain MAP > 70 for adequate renal perfusion  strict i's and o's  renally dose abx egfr <15  Will recommend avoiding IV Contrast to prevent further renal insult as pt still oliguric, requiring CVVHD support  BID bladder scan to assess for signs of renal recovery     Malika Garcia D.O  PGY 5 nephrology fellow  [phone]       51 YO Male, HTN aortic dissection previous admission with severe cardiogenic shock and oliguric TREY requiring CVVHD. Presented to the ED with worsening epigastric pain CTA/P revealed continued endoleak hospital course complicated by necrotic/hemorrhagic pancreatitis      #oligoanuric ATN 2/2 cardiogenic shock  #necrotizing pancreatitis        recommend:  patient remains oliguric with significant volume overload   will continue with CVVHD  qb 300ml/min DFR 1.5L/hour net negative 200ml/hour  q8H BMP while on CVVHD  Check phos daily  maintain MAP > 70 for adequate renal perfusion  strict i's and o's  renally dose abx egfr <15  BID bladder scan to assess for signs of renal recovery     Malika Garcia D.O  PGY 5 nephrology fellow  [phone]

## 2023-06-02 NOTE — PROGRESS NOTE ADULT - ASSESSMENT
IMPRESSION:  53 year old male hx seizure disorder, aortic dissection s/p AVR, aortic graft revision 3/20/2023, second stage TEVAR 5/5/2023, course c/b peripancreatic/RP hemorrhage/hematoma formation s/p multiple washouts.  All body fluid cultures with klebsiella pneumoniae    Recommend:  1.  Continue Meropenem 1 gram IV q8hrs  2. If patient continues to decompensate ok to restart Caspofungin IV 50mg q24hrs + Vancomycin for broader coverage  3.  Follow up repeat cultures     ID team 1 will continue to follow.  Dr. Martinez will cover the service tonight and this weekend. I will return on 6/5/2023

## 2023-06-02 NOTE — PROGRESS NOTE ADULT - ASSESSMENT
52yo Male pt with PMH HTN, type A aortic dissection s/p Dacron grafts, AV resuspension in 2013, CAD s/p CABG x 1 SVG to RCA (5/2013 with Dr. Medina), seizure disorder (last episode on 7/4/22) S/P replacement of transverse aortic arch, second stage thoracic endovascular aortic repair, aorto-axillary bypass, AV replacement (bio 23mm), in APr 2023 and CABG x 1 (SVG-RCA) EF 75% (Ignacio, 3/20) now s/p 2nd state TEVAR on 5/5 for whom surgery was consulted for finding of acute pancreatitis with large peripancreatic/perigastric fluid collections on CTA abdomen 5/8 then acute hemorrhage starting 5/12/23 requiring 11 U pRBC over last 48 hours but with negative mesenteric angiogram 5/13/23. S/p paracentesis and LUQ collection aspiration (no drain per surgery) on 5/22/23. LUQ collection growing Klebsiella pneumoniae.    5/24/23:  LUQ drain placement by IR.     5/25/23:  LUQ drain upsizing. Placement of two additional 14 F drainage catheters in the left mid and lower abdomen. Placement of a left abdominal hematoma drain.     5/26/23:   Placement of a right ascites drain and right pelvic hematoma/abscess drain by IR. Upsized left abdomen hematoma drain and left pelvic abscess/hematoma drain to 16 Fr.    5/30/23:  LUQ drain placement.     5/31/23:  To OR for ex-lap and abdominal washout. Multiple IR drains removed as described above.     6/1/23:  Return to OR for washout and abdominal closure.     -Continue to monitor output   -IR to follow.

## 2023-06-02 NOTE — ADVANCED PRACTICE NURSE CONSULT - RECOMMEDATIONS
Coccyx pressure injury - cleanse with normal saline, apply Triad cream, cover with foam dressing every other day and prn if soiled.   WOCN discussed assessment and recommendations with RN.  Sacrum/coccyx pressure injury - cleanse with normal saline, apply Triad cream, cover with foam dressing every other day and prn if soiled.   WOCN discussed assessment and recommendations with RN.

## 2023-06-02 NOTE — PROGRESS NOTE ADULT - ASSESSMENT
53-year-old male with PMHx of HTN, Type A aortic dissection s/p Dacron grafts, AV resuspension in 2013, CAD s/p CABG x 1 SVG to RCA (05/2013, Dr. Medina), seizure disorder (last episode on 07/04/2022) S/P replacement of transverse aortic arch, second stage thoracic endovascular aortic repair, aorto-axillary bypass, AV replacement (bio 23mm), in APr 2023 and CABG x 1 (SVG-RCA) EF 75% (Ignacio, 03/2020) now s/p 2nd state TEVAR (05/05/2023) prompting General Surgery consult for acute pancreatitis with large peripancreatic/perigastric fluid collections on CTA abdomen 05/08/2023 followed by acute hemorrhage starting 05/12/2023 requiring 11 U pRBC, but with negative mesenteric angiogram 05/13/2023 prompting Hepatobiliary Surgery reconsult for possible hemorrhagic pancreatitis, 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  Patient is s/p OR washout (05/31/2023), drains (RP, LUQ, pelvis) placement, and abthera wound vac.      PLAN  - Wean off sedation  - Wean off pressors  - Continue TPN and NPO status  - May start trickle feeds through J tube on 06/03  - Transfusion for Hgb < 8 as needed  - Appreciate SICU care  - Please page Surgery Team 1 at [phone] with any questions and/or clinical

## 2023-06-02 NOTE — PROGRESS NOTE ADULT - SUBJECTIVE AND OBJECTIVE BOX
Patient seen bedside, brought to the OR 6/1 for washout and abdominal closure. Drain outputs are as follows:    1. 16 Fr teal LUQ peripancreatic abscess "Left Pancreatic CODIE Drain" placed on 5/24:   -215 cc serosanguinous output in 24 hrs. 315 cc in previous 24 hrs.     2. 16 Fr teal LUQ hematoma "Left Abdominal Hematoma CODIE Drain" placed on 5/25 and upsized on 5/26:   -125 cc dark sanguinous output in 24 hrs. 60 cc in previous 24 hrs.

## 2023-06-02 NOTE — PROGRESS NOTE ADULT - ATTENDING COMMENTS
CVVHD restarted s/p OR for washout yesterday, septic shock 2/2 necrotizing pancreatitis with improving pressor support. Tolerating net 150ml/hr negative.

## 2023-06-02 NOTE — PROGRESS NOTE ADULT - SUBJECTIVE AND OBJECTIVE BOX
INTERVAL/OVERNIGHT EVENTS: 6/1: ordered 1U Platelets, RTOR, not much bleeding, old blood evaculated, placed feeding J-tube (to gravity for now), Pancreatic CODIE bilious, intraop EGD no leak. got 1U PRBC and 1 plt, off vasopressin, stopped TXA. CVVHD switched to net NEGATIVE post-op. RIJ TLC placed, ON: adv OGT - confirmed on CXR, nose bleeding, LIJ removed    SUBJECTIVE: Patient seen and examined bedside. Pt intubated and sedated. Unable to obtain subjective history at this time.    meropenem  IVPB 1000 milliGRAM(s) IV Intermittent every 8 hours      Vital Signs Last 24 Hrs  T(C): 37 (02 Jun 2023 04:57), Max: 37 (02 Jun 2023 04:57)  T(F): 98.6 (02 Jun 2023 04:57), Max: 98.6 (02 Jun 2023 04:57)  HR: 117 (02 Jun 2023 06:00) (93 - 124)  BP: 126/63 (01 Jun 2023 10:29) (126/63 - 126/63)  BP(mean): 72 (01 Jun 2023 10:29) (72 - 72)  RR: 15 (02 Jun 2023 06:00) (10 - 21)  SpO2: 100% (02 Jun 2023 06:00) (97% - 100%)    Parameters below as of 02 Jun 2023 07:00  Patient On (Oxygen Delivery Method): ventilator    O2 Concentration (%): 40  I&O's Detail    01 Jun 2023 07:01  -  02 Jun 2023 07:00  --------------------------------------------------------  IN:    Cisatracurium: 75.6 mL    FentaNYL: 73.5 mL    IV PiggyBack: 100 mL    IV PiggyBack: 75 mL    Platelets - Single Donor: 215 mL    Propofol: 361.2 mL    TPN (Total Parenteral Nutrition): 1260 mL    Vasopressin: 39 mL    Vasopressin: 39 mL  Total IN: 2238.3 mL    OUT:    Bulb (mL): 120 mL    Bulb (mL): 25 mL    Bulb (mL): 595 mL    Drain (mL): 125 mL    Drain (mL): 215 mL    Drain (mL): 0 mL    Nasogastric/Oral tube (mL): 100 mL    Other (mL): 2624 mL    VAC (Vacuum Assisted Closure) System (mL): 200 mL    Voided (mL): 300 mL  Total OUT: 4304 mL    Total NET: -2065.7 mL      02 Jun 2023 07:01  -  02 Jun 2023 07:32  --------------------------------------------------------  IN:    FentaNYL: 3.5 mL    Propofol: 16.8 mL    TPN (Total Parenteral Nutrition): 70 mL    Vasopressin: 3 mL  Total IN: 93.3 mL    OUT:  Total OUT: 0 mL    Total NET: 93.3 mL          General: NAD, resting comfortably in bed  Neuro: RASS -3, sedated with prop/fent  HEENT: ETT in place, OJ tube in place  C/V: tachycardic, S1S2, no MRG  Pulm: intubated, full AC, lungs CTAB  Abd: soft, mildly distended, midline incision CDI with island dressing in place, CODIE drains serosanginous, Jtube in place, no rebound, no guarding  : external coulter in place  Groin: Right groin hematoma soft, unchanged from previous exam  Extrem: WWP, +3 pitting edema b/l        LABS:                        9.2    14.41 )-----------( 69       ( 02 Jun 2023 05:06 )             26.4     06-02    134<L>  |  102  |  33<H>  ----------------------------<  115<H>  4.4   |  22  |  0.92    Ca    8.0<L>      02 Jun 2023 05:06  Phos  4.6     06-02  Mg     2.2     06-02    TPro  5.0<L>  /  Alb  2.3<L>  /  TBili  1.5<H>  /  DBili  1.0<H>  /  AST  23  /  ALT  14  /  AlkPhos  114  06-02    PT/INR - ( 02 Jun 2023 05:06 )   PT: 14.0 sec;   INR: 1.17          PTT - ( 02 Jun 2023 05:06 )  PTT:31.4 sec      RADIOLOGY & ADDITIONAL STUDIES:   INTERVAL/OVERNIGHT EVENTS: 6/1: ordered 1U Platelets, RTOR, not much bleeding, old blood evaculated, placed feeding J-tube (to gravity for now), Pancreatic CODIE bilious, intraop EGD no leak. got 1U PRBC and 1 plt, off vasopressin, stopped TXA. CVVHD switched to net NEGATIVE post-op. RIJ TLC placed, ON: adv OGT - confirmed on CXR, nose bleeding, LIJ removed    SUBJECTIVE: Patient seen and examined bedside. Pt intubated and sedated. Unable to obtain subjective history at this time.    meropenem  IVPB 1000 milliGRAM(s) IV Intermittent every 8 hours      Vital Signs Last 24 Hrs  T(C): 37 (02 Jun 2023 04:57), Max: 37 (02 Jun 2023 04:57)  T(F): 98.6 (02 Jun 2023 04:57), Max: 98.6 (02 Jun 2023 04:57)  HR: 117 (02 Jun 2023 06:00) (93 - 124)  BP: 126/63 (01 Jun 2023 10:29) (126/63 - 126/63)  BP(mean): 72 (01 Jun 2023 10:29) (72 - 72)  RR: 15 (02 Jun 2023 06:00) (10 - 21)  SpO2: 100% (02 Jun 2023 06:00) (97% - 100%)    Parameters below as of 02 Jun 2023 07:00  Patient On (Oxygen Delivery Method): ventilator    O2 Concentration (%): 40  I&O's Detail    01 Jun 2023 07:01  -  02 Jun 2023 07:00  --------------------------------------------------------  IN:    Cisatracurium: 75.6 mL    FentaNYL: 73.5 mL    IV PiggyBack: 100 mL    IV PiggyBack: 75 mL    Platelets - Single Donor: 215 mL    Propofol: 361.2 mL    TPN (Total Parenteral Nutrition): 1260 mL    Vasopressin: 39 mL    Vasopressin: 39 mL  Total IN: 2238.3 mL    OUT:    Bulb (mL): 120 mL    Bulb (mL): 25 mL    Bulb (mL): 595 mL    Drain (mL): 125 mL    Drain (mL): 215 mL    Drain (mL): 0 mL    Nasogastric/Oral tube (mL): 100 mL    Other (mL): 2624 mL    VAC (Vacuum Assisted Closure) System (mL): 200 mL    Voided (mL): 300 mL  Total OUT: 4304 mL    Total NET: -2065.7 mL      02 Jun 2023 07:01  -  02 Jun 2023 07:32  --------------------------------------------------------  IN:    FentaNYL: 3.5 mL    Propofol: 16.8 mL    TPN (Total Parenteral Nutrition): 70 mL    Vasopressin: 3 mL  Total IN: 93.3 mL    OUT:  Total OUT: 0 mL    Total NET: 93.3 mL          General: NAD, resting comfortably in bed  Neuro: RASS -3, sedated with prop/fent  HEENT: ETT in place, OJ tube in place  C/V: tachycardic, S1S2, no MRG  Pulm: intubated, full AC, lungs CTAB  Abd: soft, mildly distended, midline incision CDI with island dressing in place, CODIE drains bilious/serosanginous, Jtube in place, no rebound, no guarding  : external coulter in place  Groin: Right groin hematoma soft, unchanged from previous exam  Extrem: WWP, +3 pitting edema b/l        LABS:                        9.2    14.41 )-----------( 69       ( 02 Jun 2023 05:06 )             26.4     06-02    134<L>  |  102  |  33<H>  ----------------------------<  115<H>  4.4   |  22  |  0.92    Ca    8.0<L>      02 Jun 2023 05:06  Phos  4.6     06-02  Mg     2.2     06-02    TPro  5.0<L>  /  Alb  2.3<L>  /  TBili  1.5<H>  /  DBili  1.0<H>  /  AST  23  /  ALT  14  /  AlkPhos  114  06-02    PT/INR - ( 02 Jun 2023 05:06 )   PT: 14.0 sec;   INR: 1.17          PTT - ( 02 Jun 2023 05:06 )  PTT:31.4 sec      RADIOLOGY & ADDITIONAL STUDIES:

## 2023-06-03 LAB
ALBUMIN SERPL ELPH-MCNC: 2.3 G/DL — LOW (ref 3.3–5)
ALP SERPL-CCNC: 169 U/L — HIGH (ref 40–120)
ALT FLD-CCNC: 9 U/L — LOW (ref 10–45)
ANION GAP SERPL CALC-SCNC: 6 MMOL/L — SIGNIFICANT CHANGE UP (ref 5–17)
ANION GAP SERPL CALC-SCNC: 9 MMOL/L — SIGNIFICANT CHANGE UP (ref 5–17)
ANION GAP SERPL CALC-SCNC: 9 MMOL/L — SIGNIFICANT CHANGE UP (ref 5–17)
APTT BLD: 32.7 SEC — SIGNIFICANT CHANGE UP (ref 27.5–35.5)
AST SERPL-CCNC: 17 U/L — SIGNIFICANT CHANGE UP (ref 10–40)
BASE EXCESS BLDA CALC-SCNC: 1.9 MMOL/L — SIGNIFICANT CHANGE UP (ref -2–3)
BILIRUB DIRECT SERPL-MCNC: 1.4 MG/DL — HIGH (ref 0–0.3)
BILIRUB INDIRECT FLD-MCNC: 0.5 MG/DL — SIGNIFICANT CHANGE UP (ref 0.2–1)
BILIRUB SERPL-MCNC: 1.9 MG/DL — HIGH (ref 0.2–1.2)
BUN SERPL-MCNC: 28 MG/DL — HIGH (ref 7–23)
BUN SERPL-MCNC: 29 MG/DL — HIGH (ref 7–23)
BUN SERPL-MCNC: 29 MG/DL — HIGH (ref 7–23)
CALCIUM SERPL-MCNC: 8.2 MG/DL — LOW (ref 8.4–10.5)
CALCIUM SERPL-MCNC: 8.4 MG/DL — SIGNIFICANT CHANGE UP (ref 8.4–10.5)
CALCIUM SERPL-MCNC: 8.4 MG/DL — SIGNIFICANT CHANGE UP (ref 8.4–10.5)
CHLORIDE SERPL-SCNC: 101 MMOL/L — SIGNIFICANT CHANGE UP (ref 96–108)
CHLORIDE SERPL-SCNC: 102 MMOL/L — SIGNIFICANT CHANGE UP (ref 96–108)
CHLORIDE SERPL-SCNC: 104 MMOL/L — SIGNIFICANT CHANGE UP (ref 96–108)
CO2 BLDA-SCNC: 28 MMOL/L — HIGH (ref 19–24)
CO2 SERPL-SCNC: 24 MMOL/L — SIGNIFICANT CHANGE UP (ref 22–31)
CO2 SERPL-SCNC: 25 MMOL/L — SIGNIFICANT CHANGE UP (ref 22–31)
CO2 SERPL-SCNC: 26 MMOL/L — SIGNIFICANT CHANGE UP (ref 22–31)
CREAT SERPL-MCNC: 0.76 MG/DL — SIGNIFICANT CHANGE UP (ref 0.5–1.3)
CREAT SERPL-MCNC: 0.81 MG/DL — SIGNIFICANT CHANGE UP (ref 0.5–1.3)
CREAT SERPL-MCNC: 0.82 MG/DL — SIGNIFICANT CHANGE UP (ref 0.5–1.3)
CULTURE RESULTS: SIGNIFICANT CHANGE UP
CULTURE RESULTS: SIGNIFICANT CHANGE UP
EGFR: 104 ML/MIN/1.73M2 — SIGNIFICANT CHANGE UP
EGFR: 105 ML/MIN/1.73M2 — SIGNIFICANT CHANGE UP
EGFR: 107 ML/MIN/1.73M2 — SIGNIFICANT CHANGE UP
FIBRINOGEN PPP-MCNC: 355 MG/DL — SIGNIFICANT CHANGE UP (ref 200–445)
GLUCOSE SERPL-MCNC: 113 MG/DL — HIGH (ref 70–99)
GLUCOSE SERPL-MCNC: 117 MG/DL — HIGH (ref 70–99)
GLUCOSE SERPL-MCNC: 117 MG/DL — HIGH (ref 70–99)
HCO3 BLDA-SCNC: 27 MMOL/L — SIGNIFICANT CHANGE UP (ref 21–28)
HCT VFR BLD CALC: 24.9 % — LOW (ref 39–50)
HCT VFR BLD CALC: 25 % — LOW (ref 39–50)
HGB BLD-MCNC: 8.4 G/DL — LOW (ref 13–17)
HGB BLD-MCNC: 8.5 G/DL — LOW (ref 13–17)
INR BLD: 1.12 — SIGNIFICANT CHANGE UP (ref 0.88–1.16)
MAGNESIUM SERPL-MCNC: 2 MG/DL — SIGNIFICANT CHANGE UP (ref 1.6–2.6)
MAGNESIUM SERPL-MCNC: 2.1 MG/DL — SIGNIFICANT CHANGE UP (ref 1.6–2.6)
MAGNESIUM SERPL-MCNC: 2.1 MG/DL — SIGNIFICANT CHANGE UP (ref 1.6–2.6)
MCHC RBC-ENTMCNC: 29.4 PG — SIGNIFICANT CHANGE UP (ref 27–34)
MCHC RBC-ENTMCNC: 29.6 PG — SIGNIFICANT CHANGE UP (ref 27–34)
MCHC RBC-ENTMCNC: 33.7 GM/DL — SIGNIFICANT CHANGE UP (ref 32–36)
MCHC RBC-ENTMCNC: 34 GM/DL — SIGNIFICANT CHANGE UP (ref 32–36)
MCV RBC AUTO: 87.1 FL — SIGNIFICANT CHANGE UP (ref 80–100)
MCV RBC AUTO: 87.1 FL — SIGNIFICANT CHANGE UP (ref 80–100)
NRBC # BLD: 0 /100 WBCS — SIGNIFICANT CHANGE UP (ref 0–0)
NRBC # BLD: 0 /100 WBCS — SIGNIFICANT CHANGE UP (ref 0–0)
PCO2 BLDA: 41 MMHG — SIGNIFICANT CHANGE UP (ref 35–48)
PH BLDA: 7.42 — SIGNIFICANT CHANGE UP (ref 7.35–7.45)
PHOSPHATE SERPL-MCNC: 2.5 MG/DL — SIGNIFICANT CHANGE UP (ref 2.5–4.5)
PHOSPHATE SERPL-MCNC: 2.7 MG/DL — SIGNIFICANT CHANGE UP (ref 2.5–4.5)
PHOSPHATE SERPL-MCNC: 3.4 MG/DL — SIGNIFICANT CHANGE UP (ref 2.5–4.5)
PLATELET # BLD AUTO: 43 K/UL — LOW (ref 150–400)
PLATELET # BLD AUTO: 50 K/UL — LOW (ref 150–400)
PO2 BLDA: 115 MMHG — HIGH (ref 83–108)
POTASSIUM SERPL-MCNC: 3.8 MMOL/L — SIGNIFICANT CHANGE UP (ref 3.5–5.3)
POTASSIUM SERPL-MCNC: 3.8 MMOL/L — SIGNIFICANT CHANGE UP (ref 3.5–5.3)
POTASSIUM SERPL-MCNC: 3.9 MMOL/L — SIGNIFICANT CHANGE UP (ref 3.5–5.3)
POTASSIUM SERPL-SCNC: 3.8 MMOL/L — SIGNIFICANT CHANGE UP (ref 3.5–5.3)
POTASSIUM SERPL-SCNC: 3.8 MMOL/L — SIGNIFICANT CHANGE UP (ref 3.5–5.3)
POTASSIUM SERPL-SCNC: 3.9 MMOL/L — SIGNIFICANT CHANGE UP (ref 3.5–5.3)
PROT SERPL-MCNC: 5.4 G/DL — LOW (ref 6–8.3)
PROTHROM AB SERPL-ACNC: 13.4 SEC — SIGNIFICANT CHANGE UP (ref 10.5–13.4)
RBC # BLD: 2.86 M/UL — LOW (ref 4.2–5.8)
RBC # BLD: 2.87 M/UL — LOW (ref 4.2–5.8)
RBC # FLD: 15.9 % — HIGH (ref 10.3–14.5)
RBC # FLD: 16 % — HIGH (ref 10.3–14.5)
SAO2 % BLDA: 98.9 % — HIGH (ref 94–98)
SODIUM SERPL-SCNC: 133 MMOL/L — LOW (ref 135–145)
SODIUM SERPL-SCNC: 135 MMOL/L — SIGNIFICANT CHANGE UP (ref 135–145)
SODIUM SERPL-SCNC: 138 MMOL/L — SIGNIFICANT CHANGE UP (ref 135–145)
SPECIMEN SOURCE: SIGNIFICANT CHANGE UP
SPECIMEN SOURCE: SIGNIFICANT CHANGE UP
WBC # BLD: 11.59 K/UL — HIGH (ref 3.8–10.5)
WBC # BLD: 13.87 K/UL — HIGH (ref 3.8–10.5)
WBC # FLD AUTO: 11.59 K/UL — HIGH (ref 3.8–10.5)
WBC # FLD AUTO: 13.87 K/UL — HIGH (ref 3.8–10.5)

## 2023-06-03 PROCEDURE — 31500 INSERT EMERGENCY AIRWAY: CPT

## 2023-06-03 PROCEDURE — 71045 X-RAY EXAM CHEST 1 VIEW: CPT | Mod: 26

## 2023-06-03 PROCEDURE — 99291 CRITICAL CARE FIRST HOUR: CPT | Mod: 25,GC

## 2023-06-03 PROCEDURE — 99232 SBSQ HOSP IP/OBS MODERATE 35: CPT

## 2023-06-03 PROCEDURE — 90945 DIALYSIS ONE EVALUATION: CPT

## 2023-06-03 RX ORDER — FENTANYL CITRATE 50 UG/ML
100 INJECTION INTRAVENOUS ONCE
Refills: 0 | Status: DISCONTINUED | OUTPATIENT
Start: 2023-06-03 | End: 2023-06-03

## 2023-06-03 RX ORDER — I.V. FAT EMULSION 20 G/100ML
0.71 EMULSION INTRAVENOUS
Qty: 50 | Refills: 0 | Status: DISCONTINUED | OUTPATIENT
Start: 2023-06-03 | End: 2023-06-03

## 2023-06-03 RX ORDER — ELECTROLYTE SOLUTION,INJ
1 VIAL (ML) INTRAVENOUS
Refills: 0 | Status: DISCONTINUED | OUTPATIENT
Start: 2023-06-03 | End: 2023-06-03

## 2023-06-03 RX ORDER — MIDAZOLAM HYDROCHLORIDE 1 MG/ML
4 INJECTION, SOLUTION INTRAMUSCULAR; INTRAVENOUS ONCE
Refills: 0 | Status: DISCONTINUED | OUTPATIENT
Start: 2023-06-03 | End: 2023-06-03

## 2023-06-03 RX ORDER — MIDAZOLAM HYDROCHLORIDE 1 MG/ML
8 INJECTION, SOLUTION INTRAMUSCULAR; INTRAVENOUS ONCE
Refills: 0 | Status: DISCONTINUED | OUTPATIENT
Start: 2023-06-03 | End: 2023-06-03

## 2023-06-03 RX ORDER — I.V. FAT EMULSION 20 G/100ML
0.7 EMULSION INTRAVENOUS
Qty: 49 | Refills: 0 | Status: DISCONTINUED | OUTPATIENT
Start: 2023-06-03 | End: 2023-06-03

## 2023-06-03 RX ORDER — NOREPINEPHRINE BITARTRATE/D5W 8 MG/250ML
0.05 PLASTIC BAG, INJECTION (ML) INTRAVENOUS
Qty: 8 | Refills: 0 | Status: DISCONTINUED | OUTPATIENT
Start: 2023-06-03 | End: 2023-06-03

## 2023-06-03 RX ORDER — PROPOFOL 10 MG/ML
10 INJECTION, EMULSION INTRAVENOUS
Qty: 1000 | Refills: 0 | Status: DISCONTINUED | OUTPATIENT
Start: 2023-06-03 | End: 2023-06-03

## 2023-06-03 RX ORDER — PROPOFOL 10 MG/ML
10 INJECTION, EMULSION INTRAVENOUS ONCE
Refills: 0 | Status: DISCONTINUED | OUTPATIENT
Start: 2023-06-03 | End: 2023-06-03

## 2023-06-03 RX ADMIN — Medication 1 DROP(S): at 17:42

## 2023-06-03 RX ADMIN — FENTANYL CITRATE 1.75 MICROGRAM(S)/KG/HR: 50 INJECTION INTRAVENOUS at 00:25

## 2023-06-03 RX ADMIN — Medication 1 EACH: at 17:41

## 2023-06-03 RX ADMIN — DEXMEDETOMIDINE HYDROCHLORIDE IN 0.9% SODIUM CHLORIDE 3.5 MICROGRAM(S)/KG/HR: 4 INJECTION INTRAVENOUS at 00:25

## 2023-06-03 RX ADMIN — Medication 1 DROP(S): at 05:31

## 2023-06-03 RX ADMIN — CHLORHEXIDINE GLUCONATE 15 MILLILITER(S): 213 SOLUTION TOPICAL at 05:14

## 2023-06-03 RX ADMIN — MEROPENEM 100 MILLIGRAM(S): 1 INJECTION INTRAVENOUS at 05:14

## 2023-06-03 RX ADMIN — LACOSAMIDE 150 MILLIGRAM(S): 50 TABLET ORAL at 18:20

## 2023-06-03 RX ADMIN — MEROPENEM 100 MILLIGRAM(S): 1 INJECTION INTRAVENOUS at 13:29

## 2023-06-03 RX ADMIN — SODIUM CHLORIDE 4 MILLILITER(S): 9 INJECTION INTRAMUSCULAR; INTRAVENOUS; SUBCUTANEOUS at 15:35

## 2023-06-03 RX ADMIN — Medication 4 MILLILITER(S): at 15:35

## 2023-06-03 RX ADMIN — Medication 6.56 MICROGRAM(S)/KG/MIN: at 11:59

## 2023-06-03 RX ADMIN — PANTOPRAZOLE SODIUM 40 MILLIGRAM(S): 20 TABLET, DELAYED RELEASE ORAL at 11:55

## 2023-06-03 RX ADMIN — FENTANYL CITRATE 100 MICROGRAM(S): 50 INJECTION INTRAVENOUS at 11:21

## 2023-06-03 RX ADMIN — MIDAZOLAM HYDROCHLORIDE 4 MILLIGRAM(S): 1 INJECTION, SOLUTION INTRAMUSCULAR; INTRAVENOUS at 11:57

## 2023-06-03 RX ADMIN — CHLORHEXIDINE GLUCONATE 15 MILLILITER(S): 213 SOLUTION TOPICAL at 17:40

## 2023-06-03 RX ADMIN — I.V. FAT EMULSION 20.8 GM/KG/DAY: 20 EMULSION INTRAVENOUS at 17:41

## 2023-06-03 RX ADMIN — LACOSAMIDE 150 MILLIGRAM(S): 50 TABLET ORAL at 07:00

## 2023-06-03 RX ADMIN — SODIUM CHLORIDE 4 MILLILITER(S): 9 INJECTION INTRAMUSCULAR; INTRAVENOUS; SUBCUTANEOUS at 05:23

## 2023-06-03 RX ADMIN — MEROPENEM 100 MILLIGRAM(S): 1 INJECTION INTRAVENOUS at 22:28

## 2023-06-03 RX ADMIN — FENTANYL CITRATE 100 MICROGRAM(S): 50 INJECTION INTRAVENOUS at 11:44

## 2023-06-03 RX ADMIN — Medication 4 MILLILITER(S): at 09:03

## 2023-06-03 RX ADMIN — CHLORHEXIDINE GLUCONATE 1 APPLICATION(S): 213 SOLUTION TOPICAL at 11:56

## 2023-06-03 RX ADMIN — Medication 4 MILLILITER(S): at 05:23

## 2023-06-03 RX ADMIN — Medication 1 APPLICATION(S): at 11:59

## 2023-06-03 RX ADMIN — SODIUM CHLORIDE 4 MILLILITER(S): 9 INJECTION INTRAMUSCULAR; INTRAVENOUS; SUBCUTANEOUS at 09:03

## 2023-06-03 NOTE — PROGRESS NOTE ADULT - SUBJECTIVE AND OBJECTIVE BOX
STATUS POST:    5/5: second stage TEVAR  5/13: IR Celiac, SMA, splenic, and left gastric artery angiogram reveals no pseudoaneurysm or any active bleeding.  5/22:  IR Aspiration of left peripancreatic fluid collection  (15cc sanguinous) and paracentesis (4L clear yellow fluid)  5/24: IR L peripancreatic abscess collection drainage and placement of 12F drainage catheter  5/25: Rosalee upsized existing abscess drain to 16F, placed new drain to another abscess collection on left, and two drain for ascites.  5/26: Placement of two new drainage catheters and exchange of two   drainage catheters  5/30: IR LUQ drainage of 400cc purulent fluid  5/31: ex lap, wash out of ascites/old blood/scant new blood, all CODIE drain removal, CODIE x3 placement, abdomen open, UOP 0, EBL??  6/1: no bleeding, evac of old blood, placement of feeding J-tube, abd closure      SUBJECTIVE: Pt seen and examined at bedside this am by surgery team. Patient is being weaned from sedation, intubated on CPAP trial.     MEDICATIONS  (STANDING):  acetylcysteine 20%  Inhalation 4 milliLiter(s) Inhalation every 6 hours  artificial  tears Solution 1 Drop(s) Both EYES two times a day  chlorhexidine 0.12% Liquid 15 milliLiter(s) Oral Mucosa every 12 hours  chlorhexidine 2% Cloths 1 Application(s) Topical daily  chlorhexidine 4% Liquid 1 Application(s) Topical <User Schedule>  CRRT Treatment    <Continuous>  dexMEDEtomidine Infusion 0.2 MICROgram(s)/kG/Hr (3.5 mL/Hr) IV Continuous <Continuous>  dextrose 50% Injectable 25 Gram(s) IV Push once  fentaNYL   Infusion... 0.5 MICROgram(s)/kG/Hr (1.75 mL/Hr) IV Continuous <Continuous>  glucagon  Injectable 1 milliGRAM(s) IntraMuscular once  lacosamide IVPB 250 milliGRAM(s) IV Intermittent every 12 hours  lipid, fat emulsion (Fish Oil and Plant Based) 20% Infusion 0.7143 Gm/kG/Day (20.8 mL/Hr) IV Continuous <Continuous>  lipid, fat emulsion (Fish Oil and Plant Based) 20% Infusion 0.7143 Gm/kG/Day (20.8 mL/Hr) IV Continuous <Continuous>  meropenem  IVPB 1000 milliGRAM(s) IV Intermittent every 8 hours  pantoprazole  Injectable 40 milliGRAM(s) IV Push daily  Parenteral Nutrition - Adult 1 Each (70 mL/Hr) TPN Continuous <Continuous>  Parenteral Nutrition - Adult 1 Each (70 mL/Hr) TPN Continuous <Continuous>  petrolatum Ophthalmic Ointment 1 Application(s) Both EYES daily  PureFlow Dialysate RFP-400 (K 2 / Ca 3) 5000 milliLiter(s) (1500 mL/Hr) CRRT <Continuous>  sodium chloride 3%  Inhalation 4 milliLiter(s) Inhalation every 6 hours    MEDICATIONS  (PRN):  dextrose Oral Gel 15 Gram(s) Oral once PRN Blood Glucose LESS THAN 70 milliGRAM(s)/deciliter  sodium chloride 0.9% lock flush 10 milliLiter(s) IV Push every 1 hour PRN Pre/post blood products, medications, blood draw, and to maintain line patency      Vital Signs Last 24 Hrs  T(C): 37.2 (03 Jun 2023 05:40), Max: 37.6 (02 Jun 2023 17:43)  T(F): 99 (03 Jun 2023 05:40), Max: 99.6 (02 Jun 2023 17:43)  HR: 119 (03 Jun 2023 09:00) (104 - 124)  BP: --  BP(mean): --  RR: 11 (03 Jun 2023 09:00) (10 - 23)  SpO2: 100% (03 Jun 2023 09:00) (94% - 100%)    Parameters below as of 03 Jun 2023 09:00  Patient On (Oxygen Delivery Method): ventilator, cpap      Physical Exam  General: NAD, resting comfortably in bed  Neuro: weaning sedation  HEENT: ETT in place, OJ tube in place  C/V: tachycardic, S1S2, no MRG  Pulm: intubated, CPAP trial, no respiratory distress  Abd: soft, mildly distended, midline incision CDI with island dressing in place, CODIE drains serosanginous, Jtube in place, no rebound, no guarding  : coulter in place  Groin: Right groin hematoma soft, unchanged from previous exam  Extrem: WWP, +3 pitting edema b/l       I&O's Detail    02 Jun 2023 07:01  -  03 Jun 2023 07:00  --------------------------------------------------------  IN:    Albumin 5%  - 250 mL: 50 mL    Dexmedetomidine: 10.5 mL    Dexmedetomidine: 144.5 mL    Fat Emulsion (Fish Oil &amp; Plant Based) 20% Infusion: 270.4 mL    FentaNYL: 7 mL    FentaNYL: 117.3 mL    IV PiggyBack: 150 mL    IV PiggyBack: 125 mL    PRBCs (Packed Red Blood Cells): 340 mL    TPN (Total Parenteral Nutrition): 1680 mL  Total IN: 2894.7 mL    OUT:    Bulb (mL): 60 mL    Bulb (mL): 40 mL    Bulb (mL): 515 mL    Drain (mL): 120 mL    Drain (mL): 48 mL    Drain (mL): 155 mL    Nasogastric/Oral tube (mL): 120 mL    Other (mL): 50 mL    Other (mL): 4389 mL    Propofol: 0 mL    Vasopressin: 0 mL    Voided (mL): 375 mL  Total OUT: 5872 mL    Total NET: -2977.4 mL      03 Jun 2023 07:01  -  03 Jun 2023 09:07  --------------------------------------------------------  IN:    TPN (Total Parenteral Nutrition): 70 mL  Total IN: 70 mL    OUT:    Dexmedetomidine: 0 mL    Fat Emulsion (Fish Oil &amp; Plant Based) 20% Infusion: 0 mL    FentaNYL: 0 mL    Other (mL): 191 mL  Total OUT: 191 mL    Total NET: -121 mL        LABS:                        8.5    11.59 )-----------( 43       ( 03 Jun 2023 05:13 )             25.0     06-03    133<L>  |  101  |  28<H>  ----------------------------<  113<H>  3.8   |  26  |  0.81    Ca    8.2<L>      03 Jun 2023 05:13  Phos  3.4     06-03  Mg     2.1     06-03    TPro  5.4<L>  /  Alb  2.3<L>  /  TBili  1.9<H>  /  DBili  1.4<H>  /  AST  17  /  ALT  9<L>  /  AlkPhos  169<H>  06-03    PT/INR - ( 03 Jun 2023 05:13 )   PT: 13.4 sec;   INR: 1.12          PTT - ( 03 Jun 2023 05:13 )  PTT:32.7 sec

## 2023-06-03 NOTE — PROGRESS NOTE ADULT - ATTENDING COMMENTS
Acute hypoxic respiratory failure (intubated) with critical care myopathy/neuropathy with Aortic dissection s/p repair with endo leak with nette-pancreatic abscess s/p drainage. Did well on CPAP trial. Patient was extubated today. He remained tachypneic and not able to clear secretions. He was reintubated. Continue levophed for shock. Continue Meropenem. Rest as above

## 2023-06-03 NOTE — PROGRESS NOTE ADULT - SUBJECTIVE AND OBJECTIVE BOX
INFECTIOUS DISEASES CONSULT FOLLOW-UP NOTE    INTERVAL HPI/OVERNIGHT EVENTS:  no event overnight  patient extubated this morning, then re-intubated since he was unable to clear secretion  back to low dose pressor again due to sedation      ROS:   unable to obtain     ANTIBIOTICS/RELEVANT:    MEDICATIONS  (STANDING):  acetylcysteine 20%  Inhalation 4 milliLiter(s) Inhalation every 6 hours  artificial  tears Solution 1 Drop(s) Both EYES two times a day  chlorhexidine 0.12% Liquid 15 milliLiter(s) Oral Mucosa every 12 hours  chlorhexidine 2% Cloths 1 Application(s) Topical daily  chlorhexidine 4% Liquid 1 Application(s) Topical <User Schedule>  CRRT Treatment    <Continuous>  dexMEDEtomidine Infusion 0.2 MICROgram(s)/kG/Hr (3.5 mL/Hr) IV Continuous <Continuous>  dextrose 50% Injectable 25 Gram(s) IV Push once  fentaNYL   Infusion... 0.5 MICROgram(s)/kG/Hr (1.75 mL/Hr) IV Continuous <Continuous>  glucagon  Injectable 1 milliGRAM(s) IntraMuscular once  lacosamide IVPB 250 milliGRAM(s) IV Intermittent every 12 hours  lipid, fat emulsion (Fish Oil and Plant Based) 20% Infusion 0.7143 Gm/kG/Day (20.8 mL/Hr) IV Continuous <Continuous>  meropenem  IVPB 1000 milliGRAM(s) IV Intermittent every 8 hours  norepinephrine Infusion 0.05 MICROgram(s)/kG/Min (6.56 mL/Hr) IV Continuous <Continuous>  pantoprazole  Injectable 40 milliGRAM(s) IV Push daily  Parenteral Nutrition - Adult 1 Each (70 mL/Hr) TPN Continuous <Continuous>  Parenteral Nutrition - Adult 1 Each (70 mL/Hr) TPN Continuous <Continuous>  petrolatum Ophthalmic Ointment 1 Application(s) Both EYES daily  propofol Infusion 10 MICROgram(s)/kG/Min (4.2 mL/Hr) IV Continuous <Continuous>  PureFlow Dialysate RFP-401 (K 4 / Ca 3) 5000 milliLiter(s) (1500 mL/Hr) CRRT <Continuous>  sodium chloride 3%  Inhalation 4 milliLiter(s) Inhalation every 6 hours    MEDICATIONS  (PRN):  dextrose Oral Gel 15 Gram(s) Oral once PRN Blood Glucose LESS THAN 70 milliGRAM(s)/deciliter  sodium chloride 0.9% lock flush 10 milliLiter(s) IV Push every 1 hour PRN Pre/post blood products, medications, blood draw, and to maintain line patency        Vital Signs Last 24 Hrs  T(C): 36.9 (03 Jun 2023 09:33), Max: 37.6 (02 Jun 2023 17:43)  T(F): 98.5 (03 Jun 2023 09:33), Max: 99.6 (02 Jun 2023 17:43)  HR: 125 (03 Jun 2023 13:00) (104 - 125)  BP: 121/62 (03 Jun 2023 13:00) (117/67 - 126/68)  BP(mean): 85 (03 Jun 2023 13:00) (85 - 88)  RR: 21 (03 Jun 2023 13:00) (10 - 27)  SpO2: 100% (03 Jun 2023 13:00) (94% - 100%)    Parameters below as of 03 Jun 2023 13:00  Patient On (Oxygen Delivery Method): ventilator    O2 Concentration (%): 40    06-02-23 @ 07:01  -  06-03-23 @ 07:00  --------------------------------------------------------  IN: 2894.7 mL / OUT: 5872 mL / NET: -2977.4 mL    06-03-23 @ 07:01  -  06-03-23 @ 13:09  --------------------------------------------------------  IN: 373.1 mL / OUT: 1090 mL / NET: -716.9 mL      PHYSICAL EXAM:  Constitutional: intubated and sedated   ENT: the ears and nose were normal in appearance.  ET tube   Neck: the appearance of the neck was normal and the neck was supple.   Pulmonary: no respiratory distress and lungs were clear to auscultation bilaterally.   Heart: heart rate was normal and rhythm regular, normal S1 and S2  Abdomen: soft, multiple drains         LABS:                        8.4    13.87 )-----------( 50       ( 03 Jun 2023 11:42 )             24.9     06-03    133<L>  |  101  |  28<H>  ----------------------------<  113<H>  3.8   |  26  |  0.81    Ca    8.2<L>      03 Jun 2023 05:13  Phos  3.4     06-03  Mg     2.1     06-03    TPro  5.4<L>  /  Alb  2.3<L>  /  TBili  1.9<H>  /  DBili  1.4<H>  /  AST  17  /  ALT  9<L>  /  AlkPhos  169<H>  06-03    PT/INR - ( 03 Jun 2023 05:13 )   PT: 13.4 sec;   INR: 1.12          PTT - ( 03 Jun 2023 05:13 )  PTT:32.7 sec      MICROBIOLOGY:      RADIOLOGY & ADDITIONAL STUDIES:  Reviewed

## 2023-06-03 NOTE — PROGRESS NOTE ADULT - SUBJECTIVE AND OBJECTIVE BOX
--------------------------------------------------------------------------------  Chief Complaint: ESRD/Ongoing hemodialysis requirement    24 hour events/subjective:    Seen this morning s/p re-intubation. Discussed at bedside with team and nursing staff, patient with increased WOB which prompted re-intubation. Tolerating CVVHD, no issues with machine overnight.    PAST HISTORY  --------------------------------------------------------------------------------  No significant changes to PMH, PSH, FHx, SHx, unless otherwise noted    ALLERGIES & MEDICATIONS  --------------------------------------------------------------------------------  Allergies    No Known Allergies    Intolerances      Standing Inpatient Medications  acetylcysteine 20%  Inhalation 4 milliLiter(s) Inhalation every 6 hours  artificial  tears Solution 1 Drop(s) Both EYES two times a day  chlorhexidine 0.12% Liquid 15 milliLiter(s) Oral Mucosa every 12 hours  chlorhexidine 2% Cloths 1 Application(s) Topical daily  chlorhexidine 4% Liquid 1 Application(s) Topical <User Schedule>  CRRT Treatment    <Continuous>  dexMEDEtomidine Infusion 0.2 MICROgram(s)/kG/Hr IV Continuous <Continuous>  dextrose 50% Injectable 25 Gram(s) IV Push once  fentaNYL   Infusion... 0.5 MICROgram(s)/kG/Hr IV Continuous <Continuous>  glucagon  Injectable 1 milliGRAM(s) IntraMuscular once  lacosamide IVPB 250 milliGRAM(s) IV Intermittent every 12 hours  lipid, fat emulsion (Fish Oil and Plant Based) 20% Infusion 0.7143 Gm/kG/Day IV Continuous <Continuous>  meropenem  IVPB 1000 milliGRAM(s) IV Intermittent every 8 hours  norepinephrine Infusion 0.05 MICROgram(s)/kG/Min IV Continuous <Continuous>  pantoprazole  Injectable 40 milliGRAM(s) IV Push daily  Parenteral Nutrition - Adult 1 Each TPN Continuous <Continuous>  Parenteral Nutrition - Adult 1 Each TPN Continuous <Continuous>  petrolatum Ophthalmic Ointment 1 Application(s) Both EYES daily  PureFlow Dialysate RFP-400 (K 2 / Ca 3) 5000 milliLiter(s) CRRT <Continuous>  sodium chloride 3%  Inhalation 4 milliLiter(s) Inhalation every 6 hours    PRN Inpatient Medications  dextrose Oral Gel 15 Gram(s) Oral once PRN  sodium chloride 0.9% lock flush 10 milliLiter(s) IV Push every 1 hour PRN      REVIEW OF SYSTEMS  --------------------------------------------------------------------------------  All other systems were reviewed and are negative, except as noted.    VITALS/PHYSICAL EXAM  --------------------------------------------------------------------------------  T(C): 36.9 (06-03-23 @ 09:33), Max: 37.6 (06-02-23 @ 17:43)  HR: 120 (06-03-23 @ 12:00) (104 - 124)  BP: 126/68 (06-03-23 @ 12:00) (117/67 - 126/68)  RR: 20 (06-03-23 @ 12:00) (10 - 27)  SpO2: 100% (06-03-23 @ 12:00) (94% - 100%)  Wt(kg): --  Drug Dosing Weight  Height (cm): 182.9 (01 Jun 2023 10:29)  Weight (kg): 70 (01 Jun 2023 10:29)  BMI (kg/m2): 20.9 (01 Jun 2023 10:29)  BSA (m2): 1.91 (01 Jun 2023 10:29)        06-02-23 @ 07:01  -  06-03-23 @ 07:00  --------------------------------------------------------  IN: 2894.7 mL / OUT: 5872 mL / NET: -2977.4 mL    06-03-23 @ 07:01  -  06-03-23 @ 12:35  --------------------------------------------------------  IN: 363.1 mL / OUT: 301 mL / NET: 62.1 mL    PHYSICAL EXAM:  GENERAL: intubated on 40% fi02  CHEST/LUNG: mechanical breath sounds ; no accessory muscle use   HEART: normal S1S2, RRR  ABDOMEN: Soft, Nontender, +BS,   EXTREMITIES: + edema BL LE UE   SKIN: no lesions or rashes   ACCESS: L subclavian HD cath (placed 5/31)    LABS/STUDIES  --------------------------------------------------------------------------------              8.4    13.87 >-----------<  50       [06-03-23 @ 11:42]              24.9     133  |  101  |  28  ----------------------------<  113      [06-03-23 @ 05:13]  3.8   |  26  |  0.81        Ca     8.2     [06-03-23 @ 05:13]      Mg     2.1     [06-03-23 @ 05:13]      Phos  3.4     [06-03-23 @ 05:13]    TPro  5.4  /  Alb  2.3  /  TBili  1.9  /  DBili  1.4  /  AST  17  /  ALT  9   /  AlkPhos  169  [06-03-23 @ 05:13]    PT/INR: PT 13.4 , INR 1.12       [06-03-23 @ 05:13]  PTT: 32.7       [06-03-23 @ 05:13]      TSH 2.650      [05-05-23 @ 07:25]  Lipid: chol --, , HDL --, LDL --      [06-01-23 @ 05:30]        RADIOLOGY:  --------------------------------------------------------------------------------------    Hemoglobin: 8.4 g/dL (06-03-23 @ 11:42)  Hemoglobin: 8.5 g/dL (06-03-23 @ 05:13)  Phosphorus Level, Serum: 3.4 mg/dL (06-03-23 @ 05:13)  Hemoglobin: 7.7 g/dL (06-02-23 @ 20:48)    Albumin, Serum: 2.3 g/dL (06-03-23 @ 05:13)  Albumin, Serum: 2.3 g/dL (06-02-23 @ 05:06)    T(C): 36.9 (06-03-23 @ 09:33), Max: 37.6 (06-02-23 @ 17:43)  HR: 120 (06-03-23 @ 12:00) (104 - 124)  BP: 126/68 (06-03-23 @ 12:00) (117/67 - 126/68)  RR: 20 (06-03-23 @ 12:00) (10 - 27)  SpO2: 100% (06-03-23 @ 12:00) (94% - 100%)      CRRT Treatment:   Modality: CVVHD, Filter: NxStage CAR-505, Target Blood Flow: 300 mL/Min  Target Fluid Balance: Titrate to net NEGATIVE fluid balance, 150 mL/Hr (06-03-23 @ 12:38) [Active]  CRRT Treatment:   Modality: CVVHD, Filter: NxStage CAR-505, Target Blood Flow: 300 mL/Min  Target Fluid Balance: Titrate to net NEGATIVE fluid balance, 150 mL/Hr (06-02-23 @ 21:37) [Discontinued]    Seen on CRRT, c/w outlined tx as above.

## 2023-06-03 NOTE — PROGRESS NOTE ADULT - ASSESSMENT
53-year-old male with PMHx of HTN, Type A aortic dissection s/p Dacron grafts, AV resuspension in 2013, CAD s/p CABG x 1 SVG to RCA (05/2013, Dr. Medina), seizure disorder (last episode on 07/04/2022) S/P replacement of transverse aortic arch, second stage thoracic endovascular aortic repair, aorto-axillary bypass, AV replacement (bio 23mm), in APr 2023 and CABG x 1 (SVG-RCA) EF 75% (Ignacio, 03/2020) now s/p 2nd state TEVAR (05/05/2023) prompting General Surgery consult for acute pancreatitis with large peripancreatic/perigastric fluid collections on CTA abdomen 05/08/2023 followed by acute hemorrhage starting 05/12/2023 requiring 11 U pRBC, but with negative mesenteric angiogram 05/13/2023 prompting Hepatobiliary Surgery reconsult for possible hemorrhagic pancreatitis, 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  Patient is s/p OR washout (05/31/2023), drains (RP, LUQ, pelvis) placement, and abthera wound vac.      PLAN  - Wean off sedation  - Wean off pressors  - CPAP trial and extubate when appropriate  - Continue TPN and NPO status  - May start trickle feeds through J tube on 06/03  - Transfusion for Hgb < 8 as needed  - Appreciate SICU care

## 2023-06-03 NOTE — PROGRESS NOTE ADULT - ASSESSMENT
53yMale w/ PMHx of HTN, type A aortic dissection s/p Dacron grafts, AV resuspension in 2013, CAD s/p CABG x 1 SVG to RCA (5/2013 with Dr. Medina), seizure disorder, recent admission 3/20-4/14 for replacement of transverse aortic arch, second stage TEVAR and aorto-axillary bypass, AV replacement (bio 23mm), and CABG x 1 (SVG-RCA). Pt found to have endo leak and taken to OR for TEVAR revision on 5/5, Post op course c/b Klebsiella bacteremia (5/15), resp failure requiring intubation on 5/18, Mucus plugging s/p Bronch 5/19 and PEA arrest. Post operatively pt also found to have hemorrhagic pancreatitis with venu-pancreatic abscess possibly 2* to Depakote therefore s/p IR aspiration of peripancreatic fluid collection and paracentesis on 5/22, IR venu-pancreatic abscess drainage and placement of drainage catheter on 5/24. Pt then transferred from CTICU to SICU for further mgmt of hemorrhagic pancreatitis on 5/25. s/p IR placement of 2 new drainage catheters and catheter exchange on 5/26. LUQ drainage 5/30. OR 5/31 exlap washout & replacement of 3 new JPs and abthera vac with open abdomen. RTOR 6/1, no bleeding, evac of old blood, placement of feeding J-tube.     NEURO: Wean sedation: Fentanyl gtts, Precedex gtt. Hx seizures: epilepsy recs appreciated, Cont vimpat. Depakote weaned off due to concerns for drug-related hemorrhagic pancreatitis.   HEENT: Artificial tears  CV: Euvolemic on exam, CVP 5, s/p PEA arrest on 5/24 night, Septic shock: Off Vaso and levo. Echo from 5/19 hyperdynamic LV, SHAJI with LVOTO (gradient 26), normal RV, mild MR, no pulm htn. ASA 81mg held 2/2 to acute blood loss anemia. Albumin 25%q8 for 3 days.  PULM: intubated now tolerating CPAP, ARDS resolved - off NO, s/p multiple Mucus plug/ Lung collapse s/p bronch x3 (most recently on 5/26) most likely from outward obstruction: Cont rotating pt around the clock. Cont Duonebs, Mucomyst, 3% saline  GI/FEN: NPO on TPN, hold lipids while on propofol. holding Trickle TF Nepro, OGT LIWS, feeding J-tube to gravity, Protonix BID, Hemorrhagic pancreatitis: s/p Multiple Drain placements and paracentisis by IR team.  FOBT+ 5/29. Constipation: resolved, now diarrhea: Cont Rectal tube  : TREY on CVVHD - continue with net NEGATIVE. Nephro following. Cantrell d/c'ed 5/26 - Continue bladder scan daily for poss straight cath.   HEME: Acute blood loss anemia on 5/12 requiring 11units of PRBC. IR negative for mesenteric extrav. s/p 5u pPRBC since transfer to SICU, 5/31: RBC 7U, 4 PLT, 1 FFP.  intraabdominal bleed since OR, s/p 1U PRBC intraop and 1U PRBC and 1u plt this morning - HSQ held. D/C TXA BID (5/31-6/1),1U PRBC & 2u PLT 6/1  ENDO: mISS  ID: Chloe (5/21-), ID following. Kleb bacteremia from 5/15 & 5/17, subsequent bld cx negative, /// DC: Caspo (5/23-5/30), Ampicillin ( 5/21- 5/27). PNA w/ bronch cx kleb, enterobacter (zosyn, erta resistant), E faecalis, strep angionosus from 5/19, pancreatic abscess cx pan-sensitive kleb 5/22.   PPX: SCDs, SQH on hold.   LINES: L rad kalpana (5/31--), Lsubclav HD Cath (5/31--), RIJ TLC (6/1--) LIJ TLC (5/23--) // DC: RIJ TLC (5/22-5/27), RIJ HD cath (5/22-31), R Ax kalpana(5/12-5/31)  WOUNDS/DRAINS: right OR CODIE, 2 left OR CODIE, left IR hematoma drain, left IR pancreatic drain (bilious).   PT: PT/OR ordered on 5/28, re-order when off sedation.   Dispo: SICU 53yMale w/ PMHx of HTN, type A aortic dissection s/p Dacron grafts, AV resuspension in 2013, CAD s/p CABG x 1 SVG to RCA (5/2013 with Dr. Medina), seizure disorder, recent admission 3/20-4/14 for replacement of transverse aortic arch, second stage TEVAR and aorto-axillary bypass, AV replacement (bio 23mm), and CABG x 1 (SVG-RCA). Pt found to have endo leak and taken to OR for TEVAR revision on 5/5, Post op course c/b Klebsiella bacteremia (5/15), resp failure requiring intubation on 5/18, Mucus plugging s/p Bronch 5/19 and PEA arrest. Post operatively pt also found to have hemorrhagic pancreatitis with venu-pancreatic abscess possibly 2* to Depakote therefore s/p IR aspiration of peripancreatic fluid collection and paracentesis on 5/22, IR venu-pancreatic abscess drainage and placement of drainage catheter on 5/24. Pt then transferred from CTICU to SICU for further mgmt of hemorrhagic pancreatitis on 5/25. s/p IR placement of 2 new drainage catheters and catheter exchange on 5/26. LUQ drainage 5/30. OR 5/31 exlap washout & replacement of 3 new JPs and abthera vac with open abdomen. RTOR 6/1, no bleeding, evac of old blood, placement of feeding J-tube.     NEURO: Discontinue sedation (Fentanyl and Precedex) in preparation for extubation attempt. Hx seizures: Epilepsy recommendations appreciated, Continue Vimpat. Depakote weaned off due to concerns for drug-related hemorrhagic pancreatitis (05/13/2023)  HEENT: Artificial tears  CV: Euvolemic on exam, CVP 5, s/p PEA arrest on 5/24 night, Septic shock: Off Vaso and levo. Echo from 5/19 hyperdynamic LV, SHAJI with LVOTO (gradient 26), normal RV, mild MR, no pulm htn. ASA 81mg held 2/2 to acute blood loss anemia. Albumin 25%q8 for 3 days.  PULM: Attempt extubation today; intubated now tolerating CPAP, ARDS resolved - off NO, s/p multiple Mucus plug/ Lung collapse s/p bronch x3 (most recently on 5/26) most likely from outward obstruction: Cont rotating pt around the clock. Cont Duonebs, Mucomyst, 3% saline  GI/FEN: NPO on TPN, Resumed lipids with discontinuation of propofol.while on propofol. holding Trickle TF Nepro, OGT LIWS, feeding J-tube to gravity, Protonix BID, Hemorrhagic pancreatitis: s/p Multiple Drain placements and paracentisis by IR team.  FOBT+ 5/29. Constipation: resolved, now diarrhea: Cont Rectal tube  : TREY on CVVHD - continue with net NEGATIVE. Nephro following. Cantrell d/c'ed 5/26 - Continue bladder scan daily for poss straight cath.   HEME: Acute blood loss anemia on 5/12 requiring 11units of PRBC. IR negative for mesenteric extrav. s/p 5u pPRBC since transfer to SICU, 5/31: RBC 7U, 4 PLT, 1 FFP.  intraabdominal bleed since OR, s/p 1U PRBC intraop and 1U PRBC and 1u plt this morning - HSQ held. D/C TXA BID (5/31-6/1),1U PRBC & 2u PLT 6/1  ENDO: mISS  ID: Chloe (5/21-), ID following. Kleb bacteremia from 5/15 & 5/17, subsequent bld cx negative, /// DC: Caspo (5/23-5/30), Ampicillin ( 5/21- 5/27). PNA w/ bronch cx kleb, enterobacter (zosyn, erta resistant), E faecalis, strep angionosus from 5/19, pancreatic abscess cx pan-sensitive kleb 5/22.   PPX: SCDs, SQH on hold.   LINES: L radial A-line (5/31--), L subclavian HD Cath (5/31--), RIJ TLC (6/1--) LIJ TLC (5/23--) // DC: RIJ TLC (5/22-5/27), RIJ HD cath (5/22-31), R Ax kalpana(5/12-5/31)  WOUNDS/DRAINS: right OR CODIE, 2 left OR CODIE, left IR hematoma drain, left IR pancreatic drain (bilious).   PT: PT/OR ordered on 5/28, re-order when off sedation.   Dispo: SICU 53yMale w/ PMHx of HTN, type A aortic dissection s/p Dacron grafts, AV resuspension in 2013, CAD s/p CABG x 1 SVG to RCA (5/2013 with Dr. Medina), seizure disorder, recent admission 3/20-4/14 for replacement of transverse aortic arch, second stage TEVAR and aorto-axillary bypass, AV replacement (bio 23mm), and CABG x 1 (SVG-RCA). Pt found to have endo leak and taken to OR for TEVAR revision on 5/5, Post op course c/b Klebsiella bacteremia (5/15), resp failure requiring intubation on 5/18, Mucus plugging s/p Bronch 5/19 and PEA arrest. Post operatively pt also found to have hemorrhagic pancreatitis with venu-pancreatic abscess possibly 2* to Depakote therefore s/p IR aspiration of peripancreatic fluid collection and paracentesis on 5/22, IR venu-pancreatic abscess drainage and placement of drainage catheter on 5/24. Pt then transferred from CTICU to SICU for further mgmt of hemorrhagic pancreatitis on 5/25. s/p IR placement of 2 new drainage catheters and catheter exchange on 5/26. LUQ drainage 5/30. OR 5/31 exlap washout & replacement of 3 new JPs and abthera vac with open abdomen. RTOR 6/1, no bleeding, evac of old blood, placement of feeding J-tube.     NEURO: Discontinue sedation (Fentanyl and Precedex) in preparation for extubation attempt. Hx seizures: Epilepsy recommendations appreciated, Continue Vimpat. Depakote weaned off due to concerns for drug-related hemorrhagic pancreatitis (05/13/2023)  HEENT: Artificial tears  CV: Euvolemic on exam, CVP 5, s/p PEA arrest on 5/24 night, Septic shock: Off Vaso and levo. Echo from 5/19 hyperdynamic LV, SHAJI with LVOTO (gradient 26), normal RV, mild MR, no pulm htn. ASA 81mg held 2/2 to acute blood loss anemia. Albumin 25%q8 for 3 days.  PULM: Attempt extubation today; intubated now tolerating CPAP, ARDS resolved - off NO, s/p multiple Mucus plug/ Lung collapse s/p bronch x3 (most recently on 5/26) most likely from outward obstruction: Cont rotating pt around the clock. Cont Duoneb, Mucomyst, 3% saline  GI/FEN: Discontinue feeding J-tube to gravity and begin trickle TFs via J-tube. NPO on TPN, Resumed lipids with discontinuation of propofol. Nepro, OGT LIWS, Protonix BID, Hemorrhagic pancreatitis: s/p Multiple Drain placements and paracentesis by IR team.  FOBT+ 5/29. Constipation: resolved, now diarrhea: Cont Rectal tube  : TREY on CVVHD - continue with net NEGATIVE. Nephro following. Cantrell d/c'ed 5/26 - Continue bladder scan daily for poss straight cath.   HEME: Hemodynamically stable without overt signs of bleeding; continue holding SQH AC.  OVN 1uPBC given (Hgb 9.7>7.7). Acute blood loss anemia on 5/12 requiring 11units of PRBC. IR negative for mesenteric extrav. s/p 5u pPRBC since transfer to SICU, 5/31: RBC 7U, 4 PLT, 1 FFP.  intraabdominal bleed since OR, s/p 1U PRBC intraop and 1U PRBC and 1u plt this morning - HSQ held. D/C TXA BID (5/31-6/1),1U PRBC & 2u PLT 6/1  ENDO: mISS  ID: Chloe (5/21-), ID following. Kleb bacteremia from 5/15 & 5/17, subsequent bld cx negative, /// DC: Caspo (5/23-5/30), Ampicillin ( 5/21- 5/27). PNA w/ bronch cx kleb, enterobacter (zosyn, erta resistant), E faecalis, strep angionosus from 5/19, pancreatic abscess cx pan-sensitive kleb 5/22.   PPX: SCDs, SQH on hold.   LINES: L radial A-line (5/31--), L subclavian HD Cath (5/31--), RIJ TLC (6/1--) LIJ TLC (5/23--) // DC: RIJ TLC (5/22-5/27), RIJ HD cath (5/22-31), R Ax kalpana(5/12-5/31)  WOUNDS/DRAINS: right OR CODIE, 2 left OR CODIE, left IR hematoma drain, left IR pancreatic drain (bilious).   PT: PT/OR ordered on 5/28, re-order when off sedation.   Dispo: SICU 53yMale w/ PMHx of HTN, type A aortic dissection s/p Dacron grafts, AV resuspension in 2013, CAD s/p CABG x 1 SVG to RCA (5/2013 with Dr. Medina), seizure disorder, recent admission 3/20-4/14 for replacement of transverse aortic arch, second stage TEVAR and aorto-axillary bypass, AV replacement (bio 23mm), and CABG x 1 (SVG-RCA). Pt found to have endo leak and taken to OR for TEVAR revision on 5/5, Post op course c/b Klebsiella bacteremia (5/15), resp failure requiring intubation on 5/18, Mucus plugging s/p Bronch 5/19 and PEA arrest. Post operatively pt also found to have hemorrhagic pancreatitis with venu-pancreatic abscess possibly 2* to Depakote therefore s/p IR aspiration of peripancreatic fluid collection and paracentesis on 5/22, IR venu-pancreatic abscess drainage and placement of drainage catheter on 5/24. Pt then transferred from CTICU to SICU for further mgmt of hemorrhagic pancreatitis on 5/25. s/p IR placement of 2 new drainage catheters and catheter exchange on 5/26. LUQ drainage 5/30. OR 5/31 exlap washout & replacement of 3 new JPs and abthera vac with open abdomen. RTOR 6/1, no bleeding, evac of old blood, placement of feeding J-tube.     NEURO: Discontinue sedation (Fentanyl and Precedex) in preparation for extubation attempt. Hx seizures: Epilepsy recommendations appreciated, Continue Vimpat. Depakote weaned off due to concerns for drug-related hemorrhagic pancreatitis (05/13/2023)  HEENT: Artificial tears  CV: Euvolemic on exam, CVP 5, s/p PEA arrest on 5/24 night, Septic shock: Off Vaso and levo. Echo from 5/19 hyperdynamic LV, SHAJI with LVOTO (gradient 26), normal RV, mild MR, no pulm htn. ASA 81mg held 2/2 to acute blood loss anemia. Albumin 25%q8 for 3 days.  PULM: Attempt extubation today; intubated now tolerating CPAP, ARDS resolved - off NO, s/p multiple Mucus plug/ Lung collapse s/p bronch x3 (most recently on 5/26) most likely from outward obstruction: Cont rotating pt around the clock. Cont Duoneb, Mucomyst, 3% saline  GI/FEN: Discontinue feeding J-tube to gravity and begin trickle TFs via J-tube. NPO on TPN, Resumed lipids with discontinuation of propofol. Nepro, OGT LIWS, Protonix BID, Hemorrhagic pancreatitis: s/p Multiple Drain placements and paracentesis by IR team.  FOBT+ 5/29. Constipation: resolved, now diarrhea: Cont Rectal tube  : TREY on CVVHD - continue with net NEGATIVE. Nephro following. Cantrell d/c'ed 5/26 - Continue bladder scan daily for poss straight cath.   HEME: Hemodynamically stable without overt signs of bleeding; continue holding SQH AC.  OVN 1uPBC given (Hgb 9.7>7.7), Follow-up CBC at 17:30. Acute blood loss anemia on 5/12 requiring 11units of PRBC. IR negative for mesenteric extrav. s/p 5u pPRBC since transfer to SICU, 5/31: RBC 7U, 4 PLT, 1 FFP.  intraabdominal bleed since OR, s/p 1U PRBC intraop and 1U PRBC and 1u plt this morning - HSQ held. D/C TXA BID (5/31-6/1),1U PRBC & 2u PLT 6/1  ENDO: mISS  ID: Chloe (5/21-), ID following. Kleb bacteremia from 5/15 & 5/17, subsequent bld cx negative, /// DC: Caspo (5/23-5/30), Ampicillin ( 5/21- 5/27). PNA w/ bronch cx kleb, enterobacter (zosyn, erta resistant), E faecalis, strep angionosus from 5/19, pancreatic abscess cx pan-sensitive kleb 5/22.   PPX: SCDs, SQH on hold.   LINES: L radial A-line (5/31--), L subclavian HD Cath (5/31--), RIJ TLC (6/1--) LIJ TLC (5/23--) // DC: RIJ TLC (5/22-5/27), RIJ HD cath (5/22-31), R Ax kalpana(5/12-5/31)  WOUNDS/DRAINS: right OR CODIE, 2 left OR CODIE, left IR hematoma drain, left IR pancreatic drain (bilious).   PT: PT/OR ordered on 5/28, re-order when off sedation.   Dispo: SICU

## 2023-06-03 NOTE — PROGRESS NOTE ADULT - ATTENDING COMMENTS
seen on cvvhd with Dr Apodaca, agree with above  tolerating rx, VSS  cont rx as above  cont UF as tolerates as sig overload/ anasarca  possible conversion to iHD monday or tuesday if VSS off pressors

## 2023-06-03 NOTE — PROGRESS NOTE ADULT - ASSESSMENT
53 YO Male, HTN aortic dissection previous admission with severe cardiogenic shock and oliguric TREY requiring CVVHD. Presented to the ED with worsening epigastric pain CTA/P revealed continued endoleak hospital course complicated by necrotic/hemorrhagic pancreatitis      #oligoanuric ATN 2/2 cardiogenic shock  #necrotizing pancreatitis      recommend:  patient remains oliguric with significant volume overload   will continue with CVVHD  qb 300ml/min DFR 1.5L/hour net negative 150ml/hour  q8H BMP while on CVVHD  Check phos daily  maintain MAP > 70 for adequate renal perfusion  strict i's and o's  renally dose abx egfr <15  BID bladder scan to assess for signs of renal recovery

## 2023-06-03 NOTE — PROVIDER CONTACT NOTE (CHANGE IN STATUS NOTIFICATION) - ASSESSMENT
Tachycardic 130s, large amounts of bloody secretions, increase breathing workload and change of mental status

## 2023-06-03 NOTE — PROGRESS NOTE ADULT - ASSESSMENT
53M h/o seizure d/o, aortic dissection s/p AVR, aortic graft revision 3/20/23, 2nd stage TEVAR 5/5/23, course c/b peripancreatic/RP hemorrhage/hematoma formation s/p multiple washout .All cultures grew K.pneumo.      - cont meropenem 1g IV q8h  - if he is off pressor, then ok to switch to CTX 2g IV q24h  - f/u repeat cultures      Team 1 will follow you.  Dr Nicolas will resume care on Monday.  Case d/w primary team.    Reyna Martinez MD, MS  Infectious Disease attending  work cell [phone]   For any questions during evening/weekend/holiday, please page ID on call

## 2023-06-03 NOTE — PROGRESS NOTE ADULT - SUBJECTIVE AND OBJECTIVE BOX
INTERVAL/OVERNIGHT EVENTS:    SUBJECTIVE:     POD #  SICU Day #    Neurologic Medications  dexMEDEtomidine Infusion 0.2 MICROgram(s)/kG/Hr IV Continuous <Continuous>  fentaNYL   Infusion... 0.5 MICROgram(s)/kG/Hr IV Continuous <Continuous>  lacosamide IVPB 250 milliGRAM(s) IV Intermittent every 12 hours    Respiratory Medications  acetylcysteine 20%  Inhalation 4 milliLiter(s) Inhalation every 6 hours  sodium chloride 3%  Inhalation 4 milliLiter(s) Inhalation every 6 hours    Cardiovascular Medications    Gastrointestinal Medications  lipid, fat emulsion (Fish Oil and Plant Based) 20% Infusion 0.7143 Gm/kG/Day IV Continuous <Continuous>  lipid, fat emulsion (Fish Oil and Plant Based) 20% Infusion 0.7 Gm/kG/Day IV Continuous <Continuous>  pantoprazole  Injectable 40 milliGRAM(s) IV Push daily  Parenteral Nutrition - Adult 1 Each TPN Continuous <Continuous>  Parenteral Nutrition - Adult 1 Each TPN Continuous <Continuous>  sodium chloride 0.9% lock flush 10 milliLiter(s) IV Push every 1 hour PRN Pre/post blood products, medications, blood draw, and to maintain line patency    Genitourinary Medications    Hematologic/Oncologic Medications    Antimicrobial/Immunologic Medications  meropenem  IVPB 1000 milliGRAM(s) IV Intermittent every 8 hours    Endocrine/Metabolic Medications  dextrose 50% Injectable 25 Gram(s) IV Push once  dextrose Oral Gel 15 Gram(s) Oral once PRN Blood Glucose LESS THAN 70 milliGRAM(s)/deciliter  glucagon  Injectable 1 milliGRAM(s) IntraMuscular once    Topical/Other Medications  artificial  tears Solution 1 Drop(s) Both EYES two times a day  chlorhexidine 0.12% Liquid 15 milliLiter(s) Oral Mucosa every 12 hours  chlorhexidine 2% Cloths 1 Application(s) Topical daily  chlorhexidine 4% Liquid 1 Application(s) Topical <User Schedule>  CRRT Treatment    <Continuous>  petrolatum Ophthalmic Ointment 1 Application(s) Both EYES daily  PureFlow Dialysate RFP-400 (K 2 / Ca 3) 5000 milliLiter(s) CRRT <Continuous>      MEDICATIONS  (PRN):  dextrose Oral Gel 15 Gram(s) Oral once PRN Blood Glucose LESS THAN 70 milliGRAM(s)/deciliter  sodium chloride 0.9% lock flush 10 milliLiter(s) IV Push every 1 hour PRN Pre/post blood products, medications, blood draw, and to maintain line patency      I&O's Detail    02 Jun 2023 07:01  -  03 Jun 2023 07:00  --------------------------------------------------------  IN:    Albumin 5%  - 250 mL: 50 mL    Dexmedetomidine: 10.5 mL    Dexmedetomidine: 144.5 mL    Fat Emulsion (Fish Oil &amp; Plant Based) 20% Infusion: 270.4 mL    FentaNYL: 7 mL    FentaNYL: 117.3 mL    IV PiggyBack: 150 mL    IV PiggyBack: 125 mL    PRBCs (Packed Red Blood Cells): 340 mL    TPN (Total Parenteral Nutrition): 1680 mL  Total IN: 2894.7 mL    OUT:    Bulb (mL): 60 mL    Bulb (mL): 475 mL    Bulb (mL): 40 mL    Drain (mL): 120 mL    Drain (mL): 48 mL    Drain (mL): 155 mL    Nasogastric/Oral tube (mL): 120 mL    Other (mL): 50 mL    Other (mL): 4389 mL    Propofol: 0 mL    Vasopressin: 0 mL    Voided (mL): 375 mL  Total OUT: 5832 mL    Total NET: -2937.4 mL      03 Jun 2023 07:01  -  03 Jun 2023 07:35  --------------------------------------------------------  IN:    TPN (Total Parenteral Nutrition): 70 mL  Total IN: 70 mL    OUT:    Fat Emulsion (Fish Oil &amp; Plant Based) 20% Infusion: 0 mL  Total OUT: 0 mL    Total NET: 70 mL          Vital Signs Last 24 Hrs  T(C): 37.2 (03 Jun 2023 05:40), Max: 37.6 (02 Jun 2023 17:43)  T(F): 99 (03 Jun 2023 05:40), Max: 99.6 (02 Jun 2023 17:43)  HR: 113 (03 Jun 2023 07:00) (104 - 124)  BP: --  BP(mean): --  RR: 12 (03 Jun 2023 07:00) (10 - 23)  SpO2: 100% (03 Jun 2023 07:00) (94% - 100%)    Parameters below as of 03 Jun 2023 07:00  Patient On (Oxygen Delivery Method): ventilator, CPAP        GENERAL: NAD, resting comfortably in bed  HEENT: NCAT, MMM  C/V: Normal rate, normal peripheral perfusion  PULM: Nonlabored breathing, no respiratory distress, Mode: CPAP with PS, FiO2: 40, PEEP: 5, PS: 5, MAP: 6.3, PIP: 10  ABD: Soft, ND, NT, no rebound tenderness, no guarding  EXTREM: WWP, no edema, SCDs in place  NEURO: No focal deficits    General: NAD, resting comfortably in bed  Neuro: RASS -3, sedated with prop/fent  HEENT: ETT in place, OJ tube in place  C/V: tachycardic, S1S2, no MRG  Pulm: intubated, full AC, lungs CTAB  Abd: soft, mildly distended, midline incision CDI with island dressing in place, CODIE drains bilious/serosanginous, Jtube in place, no rebound, no guarding  : external coulter in place  Groin: Right groin hematoma soft, unchanged from previous exam  Extrem: WWP, +3 pitting edema b/l    LABS:                        8.5    11.59 )-----------( 43       ( 03 Jun 2023 05:13 )             25.0     06-03    133<L>  |  101  |  28<H>  ----------------------------<  113<H>  3.8   |  26  |  0.81    Ca    8.2<L>      03 Jun 2023 05:13  Phos  3.4     06-03  Mg     2.1     06-03    TPro  5.4<L>  /  Alb  2.3<L>  /  TBili  1.9<H>  /  DBili  1.4<H>  /  AST  17  /  ALT  9<L>  /  AlkPhos  169<H>  06-03    PT/INR - ( 03 Jun 2023 05:13 )   PT: 13.4 sec;   INR: 1.12          PTT - ( 03 Jun 2023 05:13 )  PTT:32.7 sec      RADIOLOGY & ADDITIONAL STUDIES:      Culture - Blood (collected 05-31-23 @ 18:20)  Source: .Blood Blood  Preliminary Report (06-02-23 @ 20:00):    No growth at 2 days.    Culture - Blood (collected 05-31-23 @ 18:00)  Source: .Blood Blood  Preliminary Report (06-02-23 @ 20:00):    No growth at 2 days.    Culture - Body Fluid with Gram Stain (collected 05-30-23 @ 14:55)  Source: .Body Fluid LUQ Drain  Gram Stain (05-30-23 @ 21:07):    No organisms seen    Few-moderate White blood cells  Final Report (06-01-23 @ 09:11):    Rare Klebsiella pneumoniae  Organism: Klebsiella pneumoniae (06-01-23 @ 09:11)  Organism: Klebsiella pneumoniae (06-01-23 @ 09:11)      Method Type: LIZETTE      -  Ampicillin: R >16 These ampicillin results predict results for amoxicillin      -  Ampicillin/Sulbactam: I 16/8 Enterobacter, Klebsiella aerogenes, Citrobacter, and Serratia may develop resistance during prolonged therapy (3-4 days)      -  Cefazolin: S <=2 Enterobacter, Klebsiella aerogenes, Citrobacter, and Serratia may develop resistance during prolonged therapy (3-4 days)      -  Ceftriaxone: S <=1 Enterobacter, Klebsiella aerogenes, Citrobacter, and Serratia may develop resistance during prolonged therapy      -  Ciprofloxacin: S <=0.25      -  Ertapenem: S <=0.5      -  Gentamicin: S <=2      -  Piperacillin/Tazobactam: S <=8      -  Tobramycin: S <=2      -  Trimethoprim/Sulfamethoxazole: S <=0.5/9.5    Culture - Blood (collected 05-29-23 @ 11:26)  Source: .Blood Blood-Peripheral  Preliminary Report (06-02-23 @ 14:00):    No growth at 4 days.    Culture - Blood (collected 05-29-23 @ 11:26)  Source: .Blood Blood-Peripheral  Preliminary Report (06-02-23 @ 14:00):    No growth at 4 days.     INTERVAL/OVERNIGHT EVENTS:  There were no acute events overnight.  Patient Hgb decreased from 9.2 to 7.7 and 1u PRBC were transfused although there were no acute signs of bleeding and vital signs were WNL.    SUBJECTIVE: Patient was examined at bedside.  He was mildly sedated with Precedex; however, he was awake tracking with his eyes, but not following commands completely.      Neurologic Medications  dexMEDEtomidine Infusion 0.2 MICROgram(s)/kG/Hr IV Continuous <Continuous>  fentaNYL   Infusion... 0.5 MICROgram(s)/kG/Hr IV Continuous <Continuous>  lacosamide IVPB 250 milliGRAM(s) IV Intermittent every 12 hours    Respiratory Medications  acetylcysteine 20%  Inhalation 4 milliLiter(s) Inhalation every 6 hours  sodium chloride 3%  Inhalation 4 milliLiter(s) Inhalation every 6 hours    Cardiovascular Medications    Gastrointestinal Medications  lipid, fat emulsion (Fish Oil and Plant Based) 20% Infusion 0.7143 Gm/kG/Day IV Continuous <Continuous>  lipid, fat emulsion (Fish Oil and Plant Based) 20% Infusion 0.7 Gm/kG/Day IV Continuous <Continuous>  pantoprazole  Injectable 40 milliGRAM(s) IV Push daily  Parenteral Nutrition - Adult 1 Each TPN Continuous <Continuous>  Parenteral Nutrition - Adult 1 Each TPN Continuous <Continuous>  sodium chloride 0.9% lock flush 10 milliLiter(s) IV Push every 1 hour PRN Pre/post blood products, medications, blood draw, and to maintain line patency    Genitourinary Medications    Hematologic/Oncologic Medications    Antimicrobial/Immunologic Medications  meropenem  IVPB 1000 milliGRAM(s) IV Intermittent every 8 hours    Endocrine/Metabolic Medications  dextrose 50% Injectable 25 Gram(s) IV Push once  dextrose Oral Gel 15 Gram(s) Oral once PRN Blood Glucose LESS THAN 70 milliGRAM(s)/deciliter  glucagon  Injectable 1 milliGRAM(s) IntraMuscular once    Topical/Other Medications  artificial  tears Solution 1 Drop(s) Both EYES two times a day  chlorhexidine 0.12% Liquid 15 milliLiter(s) Oral Mucosa every 12 hours  chlorhexidine 2% Cloths 1 Application(s) Topical daily  chlorhexidine 4% Liquid 1 Application(s) Topical <User Schedule>  CRRT Treatment    <Continuous>  petrolatum Ophthalmic Ointment 1 Application(s) Both EYES daily  PureFlow Dialysate RFP-400 (K 2 / Ca 3) 5000 milliLiter(s) CRRT <Continuous>      MEDICATIONS  (PRN):  dextrose Oral Gel 15 Gram(s) Oral once PRN Blood Glucose LESS THAN 70 milliGRAM(s)/deciliter  sodium chloride 0.9% lock flush 10 milliLiter(s) IV Push every 1 hour PRN Pre/post blood products, medications, blood draw, and to maintain line patency      I&O's Detail    02 Jun 2023 07:01  -  03 Jun 2023 07:00  --------------------------------------------------------  IN:    Albumin 5%  - 250 mL: 50 mL    Dexmedetomidine: 10.5 mL    Dexmedetomidine: 144.5 mL    Fat Emulsion (Fish Oil &amp; Plant Based) 20% Infusion: 270.4 mL    FentaNYL: 7 mL    FentaNYL: 117.3 mL    IV PiggyBack: 150 mL    IV PiggyBack: 125 mL    PRBCs (Packed Red Blood Cells): 340 mL    TPN (Total Parenteral Nutrition): 1680 mL  Total IN: 2894.7 mL    OUT:    Bulb (mL): 60 mL    Bulb (mL): 475 mL    Bulb (mL): 40 mL    Drain (mL): 120 mL    Drain (mL): 48 mL    Drain (mL): 155 mL    Nasogastric/Oral tube (mL): 120 mL    Other (mL): 50 mL    Other (mL): 4389 mL    Propofol: 0 mL    Vasopressin: 0 mL    Voided (mL): 375 mL  Total OUT: 5832 mL    Total NET: -2937.4 mL      03 Jun 2023 07:01  -  03 Jun 2023 07:35  --------------------------------------------------------  IN:    TPN (Total Parenteral Nutrition): 70 mL  Total IN: 70 mL    OUT:    Fat Emulsion (Fish Oil &amp; Plant Based) 20% Infusion: 0 mL  Total OUT: 0 mL    Total NET: 70 mL          Vital Signs Last 24 Hrs  T(C): 37.2 (03 Jun 2023 05:40), Max: 37.6 (02 Jun 2023 17:43)  T(F): 99 (03 Jun 2023 05:40), Max: 99.6 (02 Jun 2023 17:43)  HR: 113 (03 Jun 2023 07:00) (104 - 124)  BP: --  BP(mean): --  RR: 12 (03 Jun 2023 07:00) (10 - 23)  SpO2: 100% (03 Jun 2023 07:00) (94% - 100%)    Parameters below as of 03 Jun 2023 07:00  Patient On (Oxygen Delivery Method): ventilator, CPAP        GENERAL: NAD, resting comfortably in bed  HEENT: NCAT, MMM, torticollis favoring the L side, RIJ  C/V: Mild normal sinus tachycardia (-111), normal peripheral perfusion  PULM: Nonlabored breathing on CPAP ventilator, no respiratory distress, Mode: CPAP with PS, FiO2: 40, PEEP: 5, PS: 5, MAP: 6.3, PIP: 10  ABD: Soft, mld distension, no rebound tenderness, no guarding, midline incision with island dressing has serosanguinous output, 3 CODIE drain (serosanguinous output), 2 IR drains (bilious), J-tube with minimal dark output  :  Cantrell catheter in place  EXTREM: WWP, BLE 3+ pitting edema, SCDs in place  NEURO: RASS 0 to -1      LABS:                        8.5    11.59 )-----------( 43       ( 03 Jun 2023 05:13 )             25.0     06-03    133<L>  |  101  |  28<H>  ----------------------------<  113<H>  3.8   |  26  |  0.81    Ca    8.2<L>      03 Jun 2023 05:13  Phos  3.4     06-03  Mg     2.1     06-03    TPro  5.4<L>  /  Alb  2.3<L>  /  TBili  1.9<H>  /  DBili  1.4<H>  /  AST  17  /  ALT  9<L>  /  AlkPhos  169<H>  06-03    PT/INR - ( 03 Jun 2023 05:13 )   PT: 13.4 sec;   INR: 1.12          PTT - ( 03 Jun 2023 05:13 )  PTT:32.7 sec      RADIOLOGY & ADDITIONAL STUDIES:      Culture - Blood (collected 05-31-23 @ 18:20)  Source: .Blood Blood  Preliminary Report (06-02-23 @ 20:00):    No growth at 2 days.    Culture - Blood (collected 05-31-23 @ 18:00)  Source: .Blood Blood  Preliminary Report (06-02-23 @ 20:00):    No growth at 2 days.    Culture - Body Fluid with Gram Stain (collected 05-30-23 @ 14:55)  Source: .Body Fluid LUQ Drain  Gram Stain (05-30-23 @ 21:07):    No organisms seen    Few-moderate White blood cells  Final Report (06-01-23 @ 09:11):    Rare Klebsiella pneumoniae  Organism: Klebsiella pneumoniae (06-01-23 @ 09:11)  Organism: Klebsiella pneumoniae (06-01-23 @ 09:11)      Method Type: LIZETTE      -  Ampicillin: R >16 These ampicillin results predict results for amoxicillin      -  Ampicillin/Sulbactam: I 16/8 Enterobacter, Klebsiella aerogenes, Citrobacter, and Serratia may develop resistance during prolonged therapy (3-4 days)      -  Cefazolin: S <=2 Enterobacter, Klebsiella aerogenes, Citrobacter, and Serratia may develop resistance during prolonged therapy (3-4 days)      -  Ceftriaxone: S <=1 Enterobacter, Klebsiella aerogenes, Citrobacter, and Serratia may develop resistance during prolonged therapy      -  Ciprofloxacin: S <=0.25      -  Ertapenem: S <=0.5      -  Gentamicin: S <=2      -  Piperacillin/Tazobactam: S <=8      -  Tobramycin: S <=2      -  Trimethoprim/Sulfamethoxazole: S <=0.5/9.5    Culture - Blood (collected 05-29-23 @ 11:26)  Source: .Blood Blood-Peripheral  Preliminary Report (06-02-23 @ 14:00):    No growth at 4 days.    Culture - Blood (collected 05-29-23 @ 11:26)  Source: .Blood Blood-Peripheral  Preliminary Report (06-02-23 @ 14:00):    No growth at 4 days.     INTERVAL/OVERNIGHT EVENTS:  There were no acute events overnight.  Patient Hgb decreased from 9.2 to 7.7 and 1u PRBC were transfused although there were no acute signs of bleeding and vital signs were WNL.    SUBJECTIVE: Patient was examined at bedside.  He was mildly sedated with Precedex; however, he was awake tracking with his eyes, but not following commands completely.      Neurologic Medications  dexMEDEtomidine Infusion 0.2 MICROgram(s)/kG/Hr IV Continuous <Continuous>  fentaNYL   Infusion... 0.5 MICROgram(s)/kG/Hr IV Continuous <Continuous>  lacosamide IVPB 250 milliGRAM(s) IV Intermittent every 12 hours    Respiratory Medications  acetylcysteine 20%  Inhalation 4 milliLiter(s) Inhalation every 6 hours  sodium chloride 3%  Inhalation 4 milliLiter(s) Inhalation every 6 hours    Cardiovascular Medications    Gastrointestinal Medications  lipid, fat emulsion (Fish Oil and Plant Based) 20% Infusion 0.7143 Gm/kG/Day IV Continuous <Continuous>  lipid, fat emulsion (Fish Oil and Plant Based) 20% Infusion 0.7 Gm/kG/Day IV Continuous <Continuous>  pantoprazole  Injectable 40 milliGRAM(s) IV Push daily  Parenteral Nutrition - Adult 1 Each TPN Continuous <Continuous>  Parenteral Nutrition - Adult 1 Each TPN Continuous <Continuous>  sodium chloride 0.9% lock flush 10 milliLiter(s) IV Push every 1 hour PRN Pre/post blood products, medications, blood draw, and to maintain line patency    Genitourinary Medications    Hematologic/Oncologic Medications    Antimicrobial/Immunologic Medications  meropenem  IVPB 1000 milliGRAM(s) IV Intermittent every 8 hours    Endocrine/Metabolic Medications  dextrose 50% Injectable 25 Gram(s) IV Push once  dextrose Oral Gel 15 Gram(s) Oral once PRN Blood Glucose LESS THAN 70 milliGRAM(s)/deciliter  glucagon  Injectable 1 milliGRAM(s) IntraMuscular once    Topical/Other Medications  artificial  tears Solution 1 Drop(s) Both EYES two times a day  chlorhexidine 0.12% Liquid 15 milliLiter(s) Oral Mucosa every 12 hours  chlorhexidine 2% Cloths 1 Application(s) Topical daily  chlorhexidine 4% Liquid 1 Application(s) Topical <User Schedule>  CRRT Treatment    <Continuous>  petrolatum Ophthalmic Ointment 1 Application(s) Both EYES daily  PureFlow Dialysate RFP-400 (K 2 / Ca 3) 5000 milliLiter(s) CRRT <Continuous>      MEDICATIONS  (PRN):  dextrose Oral Gel 15 Gram(s) Oral once PRN Blood Glucose LESS THAN 70 milliGRAM(s)/deciliter  sodium chloride 0.9% lock flush 10 milliLiter(s) IV Push every 1 hour PRN Pre/post blood products, medications, blood draw, and to maintain line patency      I&O's Detail    02 Jun 2023 07:01  -  03 Jun 2023 07:00  --------------------------------------------------------  IN:    Albumin 5%  - 250 mL: 50 mL    Dexmedetomidine: 10.5 mL    Dexmedetomidine: 144.5 mL    Fat Emulsion (Fish Oil &amp; Plant Based) 20% Infusion: 270.4 mL    FentaNYL: 7 mL    FentaNYL: 117.3 mL    IV PiggyBack: 150 mL    IV PiggyBack: 125 mL    PRBCs (Packed Red Blood Cells): 340 mL    TPN (Total Parenteral Nutrition): 1680 mL  Total IN: 2894.7 mL    OUT:    Bulb (mL): 60 mL    Bulb (mL): 475 mL    Bulb (mL): 40 mL    Drain (mL): 120 mL    Drain (mL): 48 mL    Drain (mL): 155 mL    Nasogastric/Oral tube (mL): 120 mL    Other (mL): 50 mL    Other (mL): 4389 mL    Propofol: 0 mL    Vasopressin: 0 mL    Voided (mL): 375 mL  Total OUT: 5832 mL    Total NET: -2937.4 mL      03 Jun 2023 07:01  -  03 Jun 2023 07:35  --------------------------------------------------------  IN:    TPN (Total Parenteral Nutrition): 70 mL  Total IN: 70 mL    OUT:    Fat Emulsion (Fish Oil &amp; Plant Based) 20% Infusion: 0 mL  Total OUT: 0 mL    Total NET: 70 mL          Vital Signs Last 24 Hrs  T(C): 37.2 (03 Jun 2023 05:40), Max: 37.6 (02 Jun 2023 17:43)  T(F): 99 (03 Jun 2023 05:40), Max: 99.6 (02 Jun 2023 17:43)  HR: 113 (03 Jun 2023 07:00) (104 - 124)  BP: --  BP(mean): --  RR: 12 (03 Jun 2023 07:00) (10 - 23)  SpO2: 100% (03 Jun 2023 07:00) (94% - 100%)    Parameters below as of 03 Jun 2023 07:00  Patient On (Oxygen Delivery Method): ventilator, CPAP        GENERAL: NAD, resting comfortably in bed  HEENT: NCAT, MMM, torticollis favoring the L side, RIJ  C/V: Mild normal sinus tachycardia (-111), normal peripheral perfusion  PULM: Nonlabored breathing on CPAP ventilator, no respiratory distress, Mode: CPAP with PS, FiO2: 40, PEEP: 5, PS: 5, MAP: 6.3, PIP: 10  ABD: Soft, mld distension, no rebound tenderness, no guarding, midline incision with island dressing has serosanguinous output, 3 CODIE drain (serosanguinous output), 2 IR drains (bilious), J-tube with minimal dark output  :  Ostomy bag for urine collection in place  EXTREM: WWP, BLE 3+ pitting edema, SCDs in place  NEURO: RASS 0 to -1      LABS:                        8.5    11.59 )-----------( 43       ( 03 Jun 2023 05:13 )             25.0     06-03    133<L>  |  101  |  28<H>  ----------------------------<  113<H>  3.8   |  26  |  0.81    Ca    8.2<L>      03 Jun 2023 05:13  Phos  3.4     06-03  Mg     2.1     06-03    TPro  5.4<L>  /  Alb  2.3<L>  /  TBili  1.9<H>  /  DBili  1.4<H>  /  AST  17  /  ALT  9<L>  /  AlkPhos  169<H>  06-03    PT/INR - ( 03 Jun 2023 05:13 )   PT: 13.4 sec;   INR: 1.12          PTT - ( 03 Jun 2023 05:13 )  PTT:32.7 sec      RADIOLOGY & ADDITIONAL STUDIES:      Culture - Blood (collected 05-31-23 @ 18:20)  Source: .Blood Blood  Preliminary Report (06-02-23 @ 20:00):    No growth at 2 days.    Culture - Blood (collected 05-31-23 @ 18:00)  Source: .Blood Blood  Preliminary Report (06-02-23 @ 20:00):    No growth at 2 days.    Culture - Body Fluid with Gram Stain (collected 05-30-23 @ 14:55)  Source: .Body Fluid LUQ Drain  Gram Stain (05-30-23 @ 21:07):    No organisms seen    Few-moderate White blood cells  Final Report (06-01-23 @ 09:11):    Rare Klebsiella pneumoniae  Organism: Klebsiella pneumoniae (06-01-23 @ 09:11)  Organism: Klebsiella pneumoniae (06-01-23 @ 09:11)      Method Type: LIZETTE      -  Ampicillin: R >16 These ampicillin results predict results for amoxicillin      -  Ampicillin/Sulbactam: I 16/8 Enterobacter, Klebsiella aerogenes, Citrobacter, and Serratia may develop resistance during prolonged therapy (3-4 days)      -  Cefazolin: S <=2 Enterobacter, Klebsiella aerogenes, Citrobacter, and Serratia may develop resistance during prolonged therapy (3-4 days)      -  Ceftriaxone: S <=1 Enterobacter, Klebsiella aerogenes, Citrobacter, and Serratia may develop resistance during prolonged therapy      -  Ciprofloxacin: S <=0.25      -  Ertapenem: S <=0.5      -  Gentamicin: S <=2      -  Piperacillin/Tazobactam: S <=8      -  Tobramycin: S <=2      -  Trimethoprim/Sulfamethoxazole: S <=0.5/9.5    Culture - Blood (collected 05-29-23 @ 11:26)  Source: .Blood Blood-Peripheral  Preliminary Report (06-02-23 @ 14:00):    No growth at 4 days.    Culture - Blood (collected 05-29-23 @ 11:26)  Source: .Blood Blood-Peripheral  Preliminary Report (06-02-23 @ 14:00):    No growth at 4 days.

## 2023-06-04 LAB
ALBUMIN SERPL ELPH-MCNC: 2.3 G/DL — LOW (ref 3.3–5)
ALP SERPL-CCNC: 236 U/L — HIGH (ref 40–120)
ALT FLD-CCNC: 9 U/L — LOW (ref 10–45)
ANION GAP SERPL CALC-SCNC: 6 MMOL/L — SIGNIFICANT CHANGE UP (ref 5–17)
ANION GAP SERPL CALC-SCNC: 8 MMOL/L — SIGNIFICANT CHANGE UP (ref 5–17)
ANISOCYTOSIS BLD QL: SLIGHT — SIGNIFICANT CHANGE UP
APTT BLD: 34.7 SEC — SIGNIFICANT CHANGE UP (ref 27.5–35.5)
AST SERPL-CCNC: 20 U/L — SIGNIFICANT CHANGE UP (ref 10–40)
BASE EXCESS BLDA CALC-SCNC: 2.8 MMOL/L — SIGNIFICANT CHANGE UP (ref -2–3)
BASOPHILS # BLD AUTO: 0 K/UL — SIGNIFICANT CHANGE UP (ref 0–0.2)
BASOPHILS NFR BLD AUTO: 0 % — SIGNIFICANT CHANGE UP (ref 0–2)
BILIRUB SERPL-MCNC: 1.8 MG/DL — HIGH (ref 0.2–1.2)
BUN SERPL-MCNC: 28 MG/DL — HIGH (ref 7–23)
BUN SERPL-MCNC: 30 MG/DL — HIGH (ref 7–23)
CALCIUM SERPL-MCNC: 7.8 MG/DL — LOW (ref 8.4–10.5)
CALCIUM SERPL-MCNC: 7.9 MG/DL — LOW (ref 8.4–10.5)
CHLORIDE SERPL-SCNC: 101 MMOL/L — SIGNIFICANT CHANGE UP (ref 96–108)
CHLORIDE SERPL-SCNC: 102 MMOL/L — SIGNIFICANT CHANGE UP (ref 96–108)
CO2 BLDA-SCNC: 28 MMOL/L — HIGH (ref 19–24)
CO2 SERPL-SCNC: 25 MMOL/L — SIGNIFICANT CHANGE UP (ref 22–31)
CO2 SERPL-SCNC: 27 MMOL/L — SIGNIFICANT CHANGE UP (ref 22–31)
CREAT SERPL-MCNC: 0.72 MG/DL — SIGNIFICANT CHANGE UP (ref 0.5–1.3)
CREAT SERPL-MCNC: 0.74 MG/DL — SIGNIFICANT CHANGE UP (ref 0.5–1.3)
EGFR: 108 ML/MIN/1.73M2 — SIGNIFICANT CHANGE UP
EGFR: 109 ML/MIN/1.73M2 — SIGNIFICANT CHANGE UP
EOSINOPHIL # BLD AUTO: 1.16 K/UL — HIGH (ref 0–0.5)
EOSINOPHIL NFR BLD AUTO: 9.9 % — HIGH (ref 0–6)
FIBRINOGEN PPP-MCNC: 355 MG/DL — SIGNIFICANT CHANGE UP (ref 200–445)
GAS PNL BLDA: SIGNIFICANT CHANGE UP
GLUCOSE SERPL-MCNC: 119 MG/DL — HIGH (ref 70–99)
GLUCOSE SERPL-MCNC: 129 MG/DL — HIGH (ref 70–99)
HCO3 BLDA-SCNC: 27 MMOL/L — SIGNIFICANT CHANGE UP (ref 21–28)
HCT VFR BLD CALC: 23.8 % — LOW (ref 39–50)
HCT VFR BLD CALC: 25.7 % — LOW (ref 39–50)
HGB BLD-MCNC: 8.1 G/DL — LOW (ref 13–17)
HGB BLD-MCNC: 8.8 G/DL — LOW (ref 13–17)
HYPOCHROMIA BLD QL: SLIGHT — SIGNIFICANT CHANGE UP
INR BLD: 1.22 — HIGH (ref 0.88–1.16)
LACTATE SERPL-SCNC: 1.1 MMOL/L — SIGNIFICANT CHANGE UP (ref 0.5–2)
LYMPHOCYTES # BLD AUTO: 0.74 K/UL — LOW (ref 1–3.3)
LYMPHOCYTES # BLD AUTO: 6.3 % — LOW (ref 13–44)
MACROCYTES BLD QL: SLIGHT — SIGNIFICANT CHANGE UP
MAGNESIUM SERPL-MCNC: 1.9 MG/DL — SIGNIFICANT CHANGE UP (ref 1.6–2.6)
MAGNESIUM SERPL-MCNC: 2 MG/DL — SIGNIFICANT CHANGE UP (ref 1.6–2.6)
MANUAL SMEAR VERIFICATION: SIGNIFICANT CHANGE UP
MCHC RBC-ENTMCNC: 29.7 PG — SIGNIFICANT CHANGE UP (ref 27–34)
MCHC RBC-ENTMCNC: 29.9 PG — SIGNIFICANT CHANGE UP (ref 27–34)
MCHC RBC-ENTMCNC: 34 GM/DL — SIGNIFICANT CHANGE UP (ref 32–36)
MCHC RBC-ENTMCNC: 34.2 GM/DL — SIGNIFICANT CHANGE UP (ref 32–36)
MCV RBC AUTO: 87.2 FL — SIGNIFICANT CHANGE UP (ref 80–100)
MCV RBC AUTO: 87.4 FL — SIGNIFICANT CHANGE UP (ref 80–100)
MICROCYTES BLD QL: SLIGHT — SIGNIFICANT CHANGE UP
MONOCYTES # BLD AUTO: 0.21 K/UL — SIGNIFICANT CHANGE UP (ref 0–0.9)
MONOCYTES NFR BLD AUTO: 1.8 % — LOW (ref 2–14)
NEUTROPHILS # BLD AUTO: 9.58 K/UL — HIGH (ref 1.8–7.4)
NEUTROPHILS NFR BLD AUTO: 80.2 % — HIGH (ref 43–77)
NEUTS BAND # BLD: 1.8 % — SIGNIFICANT CHANGE UP (ref 0–8)
NRBC # BLD: 0 /100 WBCS — SIGNIFICANT CHANGE UP (ref 0–0)
OVALOCYTES BLD QL SMEAR: SLIGHT — SIGNIFICANT CHANGE UP
PCO2 BLDA: 40 MMHG — SIGNIFICANT CHANGE UP (ref 35–48)
PH BLDA: 7.44 — SIGNIFICANT CHANGE UP (ref 7.35–7.45)
PHOSPHATE SERPL-MCNC: 1.8 MG/DL — LOW (ref 2.5–4.5)
PHOSPHATE SERPL-MCNC: 2.1 MG/DL — LOW (ref 2.5–4.5)
PLAT MORPH BLD: NORMAL — SIGNIFICANT CHANGE UP
PLATELET # BLD AUTO: 45 K/UL — LOW (ref 150–400)
PLATELET # BLD AUTO: 51 K/UL — LOW (ref 150–400)
PO2 BLDA: 112 MMHG — HIGH (ref 83–108)
POIKILOCYTOSIS BLD QL AUTO: SLIGHT — SIGNIFICANT CHANGE UP
POLYCHROMASIA BLD QL SMEAR: SLIGHT — SIGNIFICANT CHANGE UP
POTASSIUM SERPL-MCNC: 3.5 MMOL/L — SIGNIFICANT CHANGE UP (ref 3.5–5.3)
POTASSIUM SERPL-MCNC: 3.9 MMOL/L — SIGNIFICANT CHANGE UP (ref 3.5–5.3)
POTASSIUM SERPL-SCNC: 3.5 MMOL/L — SIGNIFICANT CHANGE UP (ref 3.5–5.3)
POTASSIUM SERPL-SCNC: 3.9 MMOL/L — SIGNIFICANT CHANGE UP (ref 3.5–5.3)
PROT SERPL-MCNC: 5.3 G/DL — LOW (ref 6–8.3)
PROTHROM AB SERPL-ACNC: 14.5 SEC — HIGH (ref 10.5–13.4)
RBC # BLD: 2.73 M/UL — LOW (ref 4.2–5.8)
RBC # BLD: 2.94 M/UL — LOW (ref 4.2–5.8)
RBC # FLD: 15.8 % — HIGH (ref 10.3–14.5)
RBC # FLD: 15.8 % — HIGH (ref 10.3–14.5)
RBC BLD AUTO: ABNORMAL
SAO2 % BLDA: 99.2 % — HIGH (ref 94–98)
SCHISTOCYTES BLD QL AUTO: SLIGHT — SIGNIFICANT CHANGE UP
SMUDGE CELLS # BLD: PRESENT — SIGNIFICANT CHANGE UP
SODIUM SERPL-SCNC: 134 MMOL/L — LOW (ref 135–145)
SODIUM SERPL-SCNC: 135 MMOL/L — SIGNIFICANT CHANGE UP (ref 135–145)
SPHEROCYTES BLD QL SMEAR: SLIGHT — SIGNIFICANT CHANGE UP
WBC # BLD: 11.68 K/UL — HIGH (ref 3.8–10.5)
WBC # BLD: 12.99 K/UL — HIGH (ref 3.8–10.5)
WBC # FLD AUTO: 11.68 K/UL — HIGH (ref 3.8–10.5)
WBC # FLD AUTO: 12.99 K/UL — HIGH (ref 3.8–10.5)

## 2023-06-04 PROCEDURE — 90945 DIALYSIS ONE EVALUATION: CPT

## 2023-06-04 PROCEDURE — 71045 X-RAY EXAM CHEST 1 VIEW: CPT | Mod: 26

## 2023-06-04 PROCEDURE — 99232 SBSQ HOSP IP/OBS MODERATE 35: CPT

## 2023-06-04 PROCEDURE — 99291 CRITICAL CARE FIRST HOUR: CPT | Mod: GC

## 2023-06-04 RX ORDER — POTASSIUM PHOSPHATE, MONOBASIC POTASSIUM PHOSPHATE, DIBASIC 236; 224 MG/ML; MG/ML
15 INJECTION, SOLUTION INTRAVENOUS ONCE
Refills: 0 | Status: COMPLETED | OUTPATIENT
Start: 2023-06-04 | End: 2023-06-04

## 2023-06-04 RX ORDER — SODIUM,POTASSIUM PHOSPHATES 278-250MG
1 POWDER IN PACKET (EA) ORAL ONCE
Refills: 0 | Status: DISCONTINUED | OUTPATIENT
Start: 2023-06-04 | End: 2023-06-04

## 2023-06-04 RX ORDER — IPRATROPIUM/ALBUTEROL SULFATE 18-103MCG
3 AEROSOL WITH ADAPTER (GRAM) INHALATION EVERY 6 HOURS
Refills: 0 | Status: DISCONTINUED | OUTPATIENT
Start: 2023-06-04 | End: 2023-07-19

## 2023-06-04 RX ORDER — NOREPINEPHRINE BITARTRATE/D5W 8 MG/250ML
0.05 PLASTIC BAG, INJECTION (ML) INTRAVENOUS
Qty: 8 | Refills: 0 | Status: DISCONTINUED | OUTPATIENT
Start: 2023-06-04 | End: 2023-06-06

## 2023-06-04 RX ORDER — ALBUMIN HUMAN 25 %
250 VIAL (ML) INTRAVENOUS ONCE
Refills: 0 | Status: COMPLETED | OUTPATIENT
Start: 2023-06-04 | End: 2023-06-04

## 2023-06-04 RX ORDER — SODIUM,POTASSIUM PHOSPHATES 278-250MG
2 POWDER IN PACKET (EA) ORAL ONCE
Refills: 0 | Status: COMPLETED | OUTPATIENT
Start: 2023-06-04 | End: 2023-06-04

## 2023-06-04 RX ORDER — I.V. FAT EMULSION 20 G/100ML
0.7 EMULSION INTRAVENOUS
Qty: 50 | Refills: 0 | Status: DISCONTINUED | OUTPATIENT
Start: 2023-06-04 | End: 2023-06-04

## 2023-06-04 RX ORDER — SODIUM CHLORIDE 9 MG/ML
250 INJECTION INTRAMUSCULAR; INTRAVENOUS; SUBCUTANEOUS ONCE
Refills: 0 | Status: COMPLETED | OUTPATIENT
Start: 2023-06-04 | End: 2023-06-04

## 2023-06-04 RX ORDER — ELECTROLYTE SOLUTION,INJ
1 VIAL (ML) INTRAVENOUS
Refills: 0 | Status: DISCONTINUED | OUTPATIENT
Start: 2023-06-04 | End: 2023-06-04

## 2023-06-04 RX ADMIN — LACOSAMIDE 150 MILLIGRAM(S): 50 TABLET ORAL at 19:36

## 2023-06-04 RX ADMIN — Medication 4 MILLILITER(S): at 21:07

## 2023-06-04 RX ADMIN — Medication 4 MILLILITER(S): at 15:34

## 2023-06-04 RX ADMIN — SODIUM CHLORIDE 4 MILLILITER(S): 9 INJECTION INTRAMUSCULAR; INTRAVENOUS; SUBCUTANEOUS at 01:34

## 2023-06-04 RX ADMIN — MEROPENEM 100 MILLIGRAM(S): 1 INJECTION INTRAVENOUS at 21:14

## 2023-06-04 RX ADMIN — DEXMEDETOMIDINE HYDROCHLORIDE IN 0.9% SODIUM CHLORIDE 3.5 MICROGRAM(S)/KG/HR: 4 INJECTION INTRAVENOUS at 19:17

## 2023-06-04 RX ADMIN — Medication 3 MILLILITER(S): at 21:07

## 2023-06-04 RX ADMIN — CHLORHEXIDINE GLUCONATE 15 MILLILITER(S): 213 SOLUTION TOPICAL at 19:18

## 2023-06-04 RX ADMIN — Medication 3 MILLILITER(S): at 15:33

## 2023-06-04 RX ADMIN — PANTOPRAZOLE SODIUM 40 MILLIGRAM(S): 20 TABLET, DELAYED RELEASE ORAL at 11:13

## 2023-06-04 RX ADMIN — SODIUM CHLORIDE 4 MILLILITER(S): 9 INJECTION INTRAMUSCULAR; INTRAVENOUS; SUBCUTANEOUS at 05:37

## 2023-06-04 RX ADMIN — Medication 1 DROP(S): at 05:52

## 2023-06-04 RX ADMIN — FENTANYL CITRATE 1.75 MICROGRAM(S)/KG/HR: 50 INJECTION INTRAVENOUS at 15:33

## 2023-06-04 RX ADMIN — SODIUM CHLORIDE 1000 MILLILITER(S): 9 INJECTION INTRAMUSCULAR; INTRAVENOUS; SUBCUTANEOUS at 07:44

## 2023-06-04 RX ADMIN — Medication 125 MILLILITER(S): at 07:43

## 2023-06-04 RX ADMIN — MEROPENEM 100 MILLIGRAM(S): 1 INJECTION INTRAVENOUS at 13:20

## 2023-06-04 RX ADMIN — SODIUM CHLORIDE 4 MILLILITER(S): 9 INJECTION INTRAMUSCULAR; INTRAVENOUS; SUBCUTANEOUS at 10:30

## 2023-06-04 RX ADMIN — LACOSAMIDE 150 MILLIGRAM(S): 50 TABLET ORAL at 05:51

## 2023-06-04 RX ADMIN — Medication 2 PACKET(S): at 09:23

## 2023-06-04 RX ADMIN — DEXMEDETOMIDINE HYDROCHLORIDE IN 0.9% SODIUM CHLORIDE 3.5 MICROGRAM(S)/KG/HR: 4 INJECTION INTRAVENOUS at 00:35

## 2023-06-04 RX ADMIN — Medication 1 EACH: at 19:17

## 2023-06-04 RX ADMIN — MEROPENEM 100 MILLIGRAM(S): 1 INJECTION INTRAVENOUS at 05:53

## 2023-06-04 RX ADMIN — Medication 1 APPLICATION(S): at 12:18

## 2023-06-04 RX ADMIN — CHLORHEXIDINE GLUCONATE 1 APPLICATION(S): 213 SOLUTION TOPICAL at 12:18

## 2023-06-04 RX ADMIN — Medication 3 MILLILITER(S): at 10:30

## 2023-06-04 RX ADMIN — I.V. FAT EMULSION 20.83 GM/KG/DAY: 20 EMULSION INTRAVENOUS at 19:17

## 2023-06-04 RX ADMIN — DEXMEDETOMIDINE HYDROCHLORIDE IN 0.9% SODIUM CHLORIDE 3.5 MICROGRAM(S)/KG/HR: 4 INJECTION INTRAVENOUS at 13:20

## 2023-06-04 RX ADMIN — Medication 1 DROP(S): at 19:17

## 2023-06-04 RX ADMIN — Medication 4 MILLILITER(S): at 05:37

## 2023-06-04 RX ADMIN — SODIUM CHLORIDE 4 MILLILITER(S): 9 INJECTION INTRAMUSCULAR; INTRAVENOUS; SUBCUTANEOUS at 21:08

## 2023-06-04 RX ADMIN — Medication 4 MILLILITER(S): at 10:30

## 2023-06-04 RX ADMIN — SODIUM CHLORIDE 4 MILLILITER(S): 9 INJECTION INTRAMUSCULAR; INTRAVENOUS; SUBCUTANEOUS at 15:34

## 2023-06-04 RX ADMIN — Medication 4 MILLILITER(S): at 01:34

## 2023-06-04 RX ADMIN — CHLORHEXIDINE GLUCONATE 15 MILLILITER(S): 213 SOLUTION TOPICAL at 05:52

## 2023-06-04 RX ADMIN — Medication 3 MILLILITER(S): at 05:37

## 2023-06-04 NOTE — PROGRESS NOTE ADULT - SUBJECTIVE AND OBJECTIVE BOX
Course:  5/5: second stage TEVAR  5/13: IR Celiac, SMA, splenic, and left gastric artery angiogram reveals no pseudoaneurysm or any active bleeding.  5/22:  IR Aspiration of left peripancreatic fluid collection  (15cc sanguinous) and paracentesis (4L clear yellow fluid)  5/24: IR L peripancreatic abscess collection drainage and placement of 12F drainage catheter  5/25: Rosalee upsized existing abscess drain to 16F, placed new drain to another abscess collection on left, and two drain for ascites.  5/26: Placement of two new drainage catheters and exchange of two   drainage catheters  5/30: IR LUQ drainage of 400cc purulent fluid  5/31: ex lap, wash out of ascites/old blood/scant new blood, all CODIE drain removal, CODIE x3 placement, abdomen open, UOP 0, EBL??  6/1: no bleeding, evac of old blood, placement of feeding J-tube, abd closure      SUBJECTIVE: Pt seen and examined at bedside this am by surgery team.GRACIELA.    MEDICATIONS  (STANDING):  acetylcysteine 20%  Inhalation 4 milliLiter(s) Inhalation every 6 hours  albuterol/ipratropium for Nebulization 3 milliLiter(s) Nebulizer every 6 hours  artificial  tears Solution 1 Drop(s) Both EYES two times a day  chlorhexidine 0.12% Liquid 15 milliLiter(s) Oral Mucosa every 12 hours  chlorhexidine 2% Cloths 1 Application(s) Topical daily  chlorhexidine 4% Liquid 1 Application(s) Topical <User Schedule>  CRRT Treatment    <Continuous>  dexMEDEtomidine Infusion 0.2 MICROgram(s)/kG/Hr (3.5 mL/Hr) IV Continuous <Continuous>  dextrose 50% Injectable 25 Gram(s) IV Push once  fentaNYL   Infusion... 0.5 MICROgram(s)/kG/Hr (1.75 mL/Hr) IV Continuous <Continuous>  glucagon  Injectable 1 milliGRAM(s) IntraMuscular once  lacosamide IVPB 250 milliGRAM(s) IV Intermittent every 12 hours  lipid, fat emulsion (Fish Oil and Plant Based) 20% Infusion 0.7143 Gm/kG/Day (20.8 mL/Hr) IV Continuous <Continuous>  meropenem  IVPB 1000 milliGRAM(s) IV Intermittent every 8 hours  pantoprazole  Injectable 40 milliGRAM(s) IV Push daily  Parenteral Nutrition - Adult 1 Each (70 mL/Hr) TPN Continuous <Continuous>  petrolatum Ophthalmic Ointment 1 Application(s) Both EYES daily  PureFlow Dialysate RFP-401 (K 4 / Ca 3) 5000 milliLiter(s) (1500 mL/Hr) CRRT <Continuous>  sodium chloride 3%  Inhalation 4 milliLiter(s) Inhalation every 6 hours    MEDICATIONS  (PRN):  dextrose Oral Gel 15 Gram(s) Oral once PRN Blood Glucose LESS THAN 70 milliGRAM(s)/deciliter  sodium chloride 0.9% lock flush 10 milliLiter(s) IV Push every 1 hour PRN Pre/post blood products, medications, blood draw, and to maintain line patency      Vital Signs Last 24 Hrs  T(C): 36.8 (04 Jun 2023 05:16), Max: 36.9 (03 Jun 2023 09:33)  T(F): 98.2 (04 Jun 2023 05:16), Max: 98.5 (03 Jun 2023 09:33)  HR: 131 (04 Jun 2023 06:00) (109 - 134)  BP: 129/97 (03 Jun 2023 19:00) (117/67 - 129/97)  BP(mean): 77 (03 Jun 2023 19:00) (77 - 97)  RR: 19 (04 Jun 2023 06:00) (11 - 27)  SpO2: 95% (04 Jun 2023 06:00) (93% - 100%)    Parameters below as of 04 Jun 2023 06:00  Patient On (Oxygen Delivery Method): ventilator    O2 Concentration (%): 40    Physical Exam  General: NAD, resting comfortably in bed  Neuro: weaning sedation  HEENT: ETT in place, OJ tube in place  C/V: tachycardic, S1S2, no MRG  Pulm: intubated, CPAP trial, no respiratory distress  Abd: soft, mildly distended, midline incision CDI with island dressing in place, CODIE drains serosanginous, Jtube in place, no rebound, no guarding  : coulter in place  Groin: Right groin hematoma soft, unchanged from previous exam  Extrem: WWP, +3 pitting edema b/l    I&O's Summary    02 Jun 2023 07:01  -  03 Jun 2023 07:00  --------------------------------------------------------  IN: 2894.7 mL / OUT: 5872 mL / NET: -2977.4 mL    03 Jun 2023 07:01  -  04 Jun 2023 06:37  --------------------------------------------------------  IN: 2733.6 mL / OUT: 4420 mL / NET: -1686.4 mL        LABS:                        8.1    11.68 )-----------( 45       ( 04 Jun 2023 05:28 )             23.8     06-04    135  |  102  |  28<H>  ----------------------------<  129<H>  3.5   |  25  |  0.74    Ca    7.9<L>      04 Jun 2023 05:28  Phos  1.8     06-04  Mg     2.0     06-04    TPro  5.3<L>  /  Alb  2.3<L>  /  TBili  1.8<H>  /  DBili  x   /  AST  20  /  ALT  9<L>  /  AlkPhos  236<H>  06-04    PT/INR - ( 04 Jun 2023 05:28 )   PT: 14.5 sec;   INR: 1.22          PTT - ( 04 Jun 2023 05:28 )  PTT:34.7 sec    CAPILLARY BLOOD GLUCOSE        LIVER FUNCTIONS - ( 04 Jun 2023 05:28 )  Alb: 2.3 g/dL / Pro: 5.3 g/dL / ALK PHOS: 236 U/L / ALT: 9 U/L / AST: 20 U/L / GGT: x             RADIOLOGY & ADDITIONAL STUDIES:   Course:  5/5: second stage TEVAR  5/13: IR Celiac, SMA, splenic, and left gastric artery angiogram reveals no pseudoaneurysm or any active bleeding.  5/22:  IR Aspiration of left peripancreatic fluid collection  (15cc sanguinous) and paracentesis (4L clear yellow fluid)  5/24: IR L peripancreatic abscess collection drainage and placement of 12F drainage catheter  5/25: Rosalee upsized existing abscess drain to 16F, placed new drain to another abscess collection on left, and two drain for ascites.  5/26: Placement of two new drainage catheters and exchange of two   drainage catheters  5/30: IR LUQ drainage of 400cc purulent fluid  5/31: ex lap, wash out of ascites/old blood/scant new blood, all CODIE drain removal, CODIE x3 placement, abdomen open, UOP 0, EBL??  6/1: no bleeding, evac of old blood, placement of feeding J-tube, abd closure      SUBJECTIVE: Pt seen and examined at bedside this am by surgery team. GRACIELA. Overnight Precedex was being weaned. Pt intubated and was unable to participate in HPI on rounds.     MEDICATIONS  (STANDING):  acetylcysteine 20%  Inhalation 4 milliLiter(s) Inhalation every 6 hours  albuterol/ipratropium for Nebulization 3 milliLiter(s) Nebulizer every 6 hours  artificial  tears Solution 1 Drop(s) Both EYES two times a day  chlorhexidine 0.12% Liquid 15 milliLiter(s) Oral Mucosa every 12 hours  chlorhexidine 2% Cloths 1 Application(s) Topical daily  chlorhexidine 4% Liquid 1 Application(s) Topical <User Schedule>  CRRT Treatment    <Continuous>  dexMEDEtomidine Infusion 0.2 MICROgram(s)/kG/Hr (3.5 mL/Hr) IV Continuous <Continuous>  dextrose 50% Injectable 25 Gram(s) IV Push once  fentaNYL   Infusion... 0.5 MICROgram(s)/kG/Hr (1.75 mL/Hr) IV Continuous <Continuous>  glucagon  Injectable 1 milliGRAM(s) IntraMuscular once  lacosamide IVPB 250 milliGRAM(s) IV Intermittent every 12 hours  lipid, fat emulsion (Fish Oil and Plant Based) 20% Infusion 0.7143 Gm/kG/Day (20.8 mL/Hr) IV Continuous <Continuous>  meropenem  IVPB 1000 milliGRAM(s) IV Intermittent every 8 hours  pantoprazole  Injectable 40 milliGRAM(s) IV Push daily  Parenteral Nutrition - Adult 1 Each (70 mL/Hr) TPN Continuous <Continuous>  petrolatum Ophthalmic Ointment 1 Application(s) Both EYES daily  PureFlow Dialysate RFP-401 (K 4 / Ca 3) 5000 milliLiter(s) (1500 mL/Hr) CRRT <Continuous>  sodium chloride 3%  Inhalation 4 milliLiter(s) Inhalation every 6 hours    MEDICATIONS  (PRN):  dextrose Oral Gel 15 Gram(s) Oral once PRN Blood Glucose LESS THAN 70 milliGRAM(s)/deciliter  sodium chloride 0.9% lock flush 10 milliLiter(s) IV Push every 1 hour PRN Pre/post blood products, medications, blood draw, and to maintain line patency      Vital Signs Last 24 Hrs  T(C): 36.8 (04 Jun 2023 05:16), Max: 36.9 (03 Jun 2023 09:33)  T(F): 98.2 (04 Jun 2023 05:16), Max: 98.5 (03 Jun 2023 09:33)  HR: 131 (04 Jun 2023 06:00) (109 - 134)  BP: 129/97 (03 Jun 2023 19:00) (117/67 - 129/97)  BP(mean): 77 (03 Jun 2023 19:00) (77 - 97)  RR: 19 (04 Jun 2023 06:00) (11 - 27)  SpO2: 95% (04 Jun 2023 06:00) (93% - 100%)    Parameters below as of 04 Jun 2023 06:00  Patient On (Oxygen Delivery Method): ventilator    O2 Concentration (%): 40    Physical Exam  General: NAD, resting comfortably in bed  Neuro: weaning sedation  HEENT: ETT in place, OJ tube in place  C/V: tachycardic, S1S2, no MRG  Pulm: intubated, no respiratory distress  Abd: soft, mildly distended, midline incision CDI with island dressing in place, CODIE drains serosanginous, Jtube in place, no rebound, no guarding  : coulter in place   Groin: Right groin hematoma soft, unchanged from previous exam  Extrem: WWP, +3 pitting edema b/l    I&O's Summary    02 Jun 2023 07:01  -  03 Jun 2023 07:00  --------------------------------------------------------  IN: 2894.7 mL / OUT: 5872 mL / NET: -2977.4 mL    03 Jun 2023 07:01  -  04 Jun 2023 06:37  --------------------------------------------------------  IN: 2733.6 mL / OUT: 4420 mL / NET: -1686.4 mL        LABS:                        8.1    11.68 )-----------( 45       ( 04 Jun 2023 05:28 )             23.8     06-04    135  |  102  |  28<H>  ----------------------------<  129<H>  3.5   |  25  |  0.74    Ca    7.9<L>      04 Jun 2023 05:28  Phos  1.8     06-04  Mg     2.0     06-04    TPro  5.3<L>  /  Alb  2.3<L>  /  TBili  1.8<H>  /  DBili  x   /  AST  20  /  ALT  9<L>  /  AlkPhos  236<H>  06-04    PT/INR - ( 04 Jun 2023 05:28 )   PT: 14.5 sec;   INR: 1.22          PTT - ( 04 Jun 2023 05:28 )  PTT:34.7 sec    CAPILLARY BLOOD GLUCOSE        LIVER FUNCTIONS - ( 04 Jun 2023 05:28 )  Alb: 2.3 g/dL / Pro: 5.3 g/dL / ALK PHOS: 236 U/L / ALT: 9 U/L / AST: 20 U/L / GGT: x             RADIOLOGY & ADDITIONAL STUDIES:   Course:  5/5: second stage TEVAR  5/13: IR Celiac, SMA, splenic, and left gastric artery angiogram reveals no pseudoaneurysm or any active bleeding.  5/22:  IR Aspiration of left peripancreatic fluid collection  (15cc sanguinous) and paracentesis (4L clear yellow fluid)  5/24: IR L peripancreatic abscess collection drainage and placement of 12F drainage catheter  5/25: Rosalee upsized existing abscess drain to 16F, placed new drain to another abscess collection on left, and two drain for ascites.  5/26: Placement of two new drainage catheters and exchange of two   drainage catheters  5/30: IR LUQ drainage of 400cc purulent fluid  5/31: ex lap, wash out of ascites/old blood/scant new blood, all CODIE drain removal, CODIE x3 placement, abdomen open, UOP 0, EBL??  6/1: no bleeding, evac of old blood, placement of feeding J-tube, abd closure      SUBJECTIVE: Pt seen and examined at bedside this am by surgery team. GRACIELA. Overnight Precedex was being weaned. Pt intubated and was unable to participate in HPI on rounds.  Getting tube feeds at 10ml/hr    MEDICATIONS  (STANDING):  acetylcysteine 20%  Inhalation 4 milliLiter(s) Inhalation every 6 hours  albuterol/ipratropium for Nebulization 3 milliLiter(s) Nebulizer every 6 hours  artificial  tears Solution 1 Drop(s) Both EYES two times a day  chlorhexidine 0.12% Liquid 15 milliLiter(s) Oral Mucosa every 12 hours  chlorhexidine 2% Cloths 1 Application(s) Topical daily  chlorhexidine 4% Liquid 1 Application(s) Topical <User Schedule>  CRRT Treatment    <Continuous>  dexMEDEtomidine Infusion 0.2 MICROgram(s)/kG/Hr (3.5 mL/Hr) IV Continuous <Continuous>  dextrose 50% Injectable 25 Gram(s) IV Push once  fentaNYL   Infusion... 0.5 MICROgram(s)/kG/Hr (1.75 mL/Hr) IV Continuous <Continuous>  glucagon  Injectable 1 milliGRAM(s) IntraMuscular once  lacosamide IVPB 250 milliGRAM(s) IV Intermittent every 12 hours  lipid, fat emulsion (Fish Oil and Plant Based) 20% Infusion 0.7143 Gm/kG/Day (20.8 mL/Hr) IV Continuous <Continuous>  meropenem  IVPB 1000 milliGRAM(s) IV Intermittent every 8 hours  pantoprazole  Injectable 40 milliGRAM(s) IV Push daily  Parenteral Nutrition - Adult 1 Each (70 mL/Hr) TPN Continuous <Continuous>  petrolatum Ophthalmic Ointment 1 Application(s) Both EYES daily  PureFlow Dialysate RFP-401 (K 4 / Ca 3) 5000 milliLiter(s) (1500 mL/Hr) CRRT <Continuous>  sodium chloride 3%  Inhalation 4 milliLiter(s) Inhalation every 6 hours    MEDICATIONS  (PRN):  dextrose Oral Gel 15 Gram(s) Oral once PRN Blood Glucose LESS THAN 70 milliGRAM(s)/deciliter  sodium chloride 0.9% lock flush 10 milliLiter(s) IV Push every 1 hour PRN Pre/post blood products, medications, blood draw, and to maintain line patency      Vital Signs Last 24 Hrs  T(C): 36.8 (04 Jun 2023 05:16), Max: 36.9 (03 Jun 2023 09:33)  T(F): 98.2 (04 Jun 2023 05:16), Max: 98.5 (03 Jun 2023 09:33)  HR: 131 (04 Jun 2023 06:00) (109 - 134)  BP: 129/97 (03 Jun 2023 19:00) (117/67 - 129/97)  BP(mean): 77 (03 Jun 2023 19:00) (77 - 97)  RR: 19 (04 Jun 2023 06:00) (11 - 27)  SpO2: 95% (04 Jun 2023 06:00) (93% - 100%)    Parameters below as of 04 Jun 2023 06:00  Patient On (Oxygen Delivery Method): ventilator    O2 Concentration (%): 40    Physical Exam  General: NAD, resting comfortably in bed  Neuro: weaning sedation  HEENT: ETT in place, OJ tube in place  C/V: tachycardic, S1S2, no MRG  Pulm: intubated, no respiratory distress  Abd: soft, mildly distended, midline incision CDI with island dressing in place, CODIE drains serosanginous, Jtube in place, no rebound, no guarding  : coulter in place   Groin: Right groin hematoma soft, unchanged from previous exam  Extrem: WWP, +3 pitting edema b/l    I&O's Summary    02 Jun 2023 07:01  -  03 Jun 2023 07:00  --------------------------------------------------------  IN: 2894.7 mL / OUT: 5872 mL / NET: -2977.4 mL    03 Jun 2023 07:01  -  04 Jun 2023 06:37  --------------------------------------------------------  IN: 2733.6 mL / OUT: 4420 mL / NET: -1686.4 mL        LABS:                        8.1    11.68 )-----------( 45       ( 04 Jun 2023 05:28 )             23.8     06-04    135  |  102  |  28<H>  ----------------------------<  129<H>  3.5   |  25  |  0.74    Ca    7.9<L>      04 Jun 2023 05:28  Phos  1.8     06-04  Mg     2.0     06-04    TPro  5.3<L>  /  Alb  2.3<L>  /  TBili  1.8<H>  /  DBili  x   /  AST  20  /  ALT  9<L>  /  AlkPhos  236<H>  06-04    PT/INR - ( 04 Jun 2023 05:28 )   PT: 14.5 sec;   INR: 1.22          PTT - ( 04 Jun 2023 05:28 )  PTT:34.7 sec    CAPILLARY BLOOD GLUCOSE        LIVER FUNCTIONS - ( 04 Jun 2023 05:28 )  Alb: 2.3 g/dL / Pro: 5.3 g/dL / ALK PHOS: 236 U/L / ALT: 9 U/L / AST: 20 U/L / GGT: x             RADIOLOGY & ADDITIONAL STUDIES:

## 2023-06-04 NOTE — PROGRESS NOTE ADULT - ASSESSMENT
54yo Male pt with PMH HTN, type A aortic dissection s/p Dacron grafts, AV resuspension in 2013, CAD s/p CABG x 1 SVG to RCA (5/2013 with Dr. Medina), seizure disorder (last episode on 7/4/22) S/P replacement of transverse aortic arch, second stage thoracic endovascular aortic repair, aorto-axillary bypass, AV replacement (bio 23mm), in APr 2023 and CABG x 1 (SVG-RCA) EF 75% (Ignacio, 3/20) now s/p 2nd state TEVAR on 5/5 for whom surgery was consulted for finding of acute pancreatitis with large peripancreatic/perigastric fluid collections on CTA abdomen 5/8 then acute hemorrhage starting 5/12/23 requiring 11 U pRBC over last 48 hours but with negative mesenteric angiogram 5/13/23. S/p paracentesis and LUQ collection aspiration (no drain per surgery) on 5/22/23. LUQ collection growing Klebsiella pneumoniae.    5/24/23:  LUQ drain placement by IR.     5/25/23:  LUQ drain upsizing. Placement of two additional 14 F drainage catheters in the left mid and lower abdomen. Placement of a left abdominal hematoma drain.     5/26/23:   Placement of a right ascites drain and right pelvic hematoma/abscess drain by IR. Upsized left abdomen hematoma drain and left pelvic abscess/hematoma drain to 16 Fr.    5/30/23:  LUQ drain placement.     5/31/23:  To OR for ex-lap and abdominal washout. Multiple IR drains removed as described above.     6/1/23:  Return to OR for washout and abdominal closure.     -Continue to monitor output   -IR to follow.

## 2023-06-04 NOTE — PROGRESS NOTE ADULT - ASSESSMENT
51 YO Male, HTN aortic dissection previous admission with severe cardiogenic shock and oliguric TREY requiring CVVHD. Presented to the ED with worsening epigastric pain CTA/P revealed continued endoleak hospital course complicated by necrotic/hemorrhagic pancreatitis      #oligoanuric ATN 2/2 cardiogenic shock  #necrotizing pancreatitis    recommend:  patient remains oliguric with significant volume overload   will continue with CVVHD on Phoxillum  qb 300ml/min DFR 1.5L/hour net even for now  q8H BMP while on CVVHD  Check phos daily  maintain MAP > 70 for adequate renal perfusion  strict i's and o's  renally dose abx egfr <15  BID bladder scan to assess for signs of renal recovery

## 2023-06-04 NOTE — PROGRESS NOTE ADULT - SUBJECTIVE AND OBJECTIVE BOX
INFECTIOUS DISEASES CONSULT FOLLOW-UP NOTE    INTERVAL HPI/OVERNIGHT EVENTS:  No event overnight  of precedex, on levo still  remains intubated and sedated     ROS:   unable to obtain     ANTIBIOTICS/RELEVANT:    MEDICATIONS  (STANDING):  acetylcysteine 20%  Inhalation 4 milliLiter(s) Inhalation every 6 hours  albuterol/ipratropium for Nebulization 3 milliLiter(s) Nebulizer every 6 hours  artificial  tears Solution 1 Drop(s) Both EYES two times a day  chlorhexidine 0.12% Liquid 15 milliLiter(s) Oral Mucosa every 12 hours  chlorhexidine 2% Cloths 1 Application(s) Topical daily  chlorhexidine 4% Liquid 1 Application(s) Topical <User Schedule>  CRRT Treatment    <Continuous>  CRRT Treatment    <Continuous>  dexMEDEtomidine Infusion 0.2 MICROgram(s)/kG/Hr (3.5 mL/Hr) IV Continuous <Continuous>  dextrose 50% Injectable 25 Gram(s) IV Push once  fentaNYL   Infusion... 0.5 MICROgram(s)/kG/Hr (1.75 mL/Hr) IV Continuous <Continuous>  glucagon  Injectable 1 milliGRAM(s) IntraMuscular once  lacosamide IVPB 250 milliGRAM(s) IV Intermittent every 12 hours  lipid, fat emulsion (Fish Oil and Plant Based) 20% Infusion 0.7 Gm/kG/Day (20.83 mL/Hr) IV Continuous <Continuous>  meropenem  IVPB 1000 milliGRAM(s) IV Intermittent every 8 hours  norepinephrine Infusion 0.05 MICROgram(s)/kG/Min (6.56 mL/Hr) IV Continuous <Continuous>  pantoprazole  Injectable 40 milliGRAM(s) IV Push daily  Parenteral Nutrition - Adult 1 Each (70 mL/Hr) TPN Continuous <Continuous>  Parenteral Nutrition - Adult 1 Each (70 mL/Hr) TPN Continuous <Continuous>  petrolatum Ophthalmic Ointment 1 Application(s) Both EYES daily  Phoxillum Filtration BK 4 / 2.5 5000 milliLiter(s) (1500 mL/Hr) CRRT <Continuous>  PureFlow Dialysate RFP-401 (K 4 / Ca 3) 5000 milliLiter(s) (1500 mL/Hr) CRRT <Continuous>  sodium chloride 3%  Inhalation 4 milliLiter(s) Inhalation every 6 hours    MEDICATIONS  (PRN):  dextrose Oral Gel 15 Gram(s) Oral once PRN Blood Glucose LESS THAN 70 milliGRAM(s)/deciliter  sodium chloride 0.9% lock flush 10 milliLiter(s) IV Push every 1 hour PRN Pre/post blood products, medications, blood draw, and to maintain line patency        Vital Signs Last 24 Hrs  T(C): 37 (04 Jun 2023 09:15), Max: 37 (04 Jun 2023 09:15)  T(F): 98.6 (04 Jun 2023 09:15), Max: 98.6 (04 Jun 2023 09:15)  HR: 101 (04 Jun 2023 12:08) (91 - 134)  BP: 76/47 (04 Jun 2023 07:00) (76/47 - 129/97)  BP(mean): 57 (04 Jun 2023 07:00) (57 - 97)  RR: 13 (04 Jun 2023 12:00) (13 - 26)  SpO2: 100% (04 Jun 2023 12:08) (93% - 100%)    Parameters below as of 04 Jun 2023 12:00  Patient On (Oxygen Delivery Method): ventilator, CPAP    O2 Concentration (%): 40    06-03-23 @ 07:01  -  06-04-23 @ 07:00  --------------------------------------------------------  IN: 3108.6 mL / OUT: 4701 mL / NET: -1592.4 mL    06-04-23 @ 07:01  -  06-04-23 @ 13:37  --------------------------------------------------------  IN: 736.1 mL / OUT: 321 mL / NET: 415.1 mL      PHYSICAL EXAM:  Constitutional: intubated and sedated   ENT: the ears and nose were normal in appearance.  ET tube   Neck: the appearance of the neck was normal and the neck was supple.   Pulmonary: no respiratory distress and lungs were clear to auscultation bilaterally.   Heart: heart rate was normal and rhythm regular, normal S1 and S2  Abdomen: soft, multiple drains          LABS:                        8.1    11.68 )-----------( 45       ( 04 Jun 2023 05:28 )             23.8     06-04    135  |  102  |  28<H>  ----------------------------<  129<H>  3.5   |  25  |  0.74    Ca    7.9<L>      04 Jun 2023 05:28  Phos  1.8     06-04  Mg     2.0     06-04    TPro  5.3<L>  /  Alb  2.3<L>  /  TBili  1.8<H>  /  DBili  x   /  AST  20  /  ALT  9<L>  /  AlkPhos  236<H>  06-04    PT/INR - ( 04 Jun 2023 05:28 )   PT: 14.5 sec;   INR: 1.22          PTT - ( 04 Jun 2023 05:28 )  PTT:34.7 sec      MICROBIOLOGY:      RADIOLOGY & ADDITIONAL STUDIES:  Reviewed

## 2023-06-04 NOTE — PROGRESS NOTE ADULT - ATTENDING COMMENTS
seen on CVVHD with jacinta Murrell with above  holding on UF now with drop in BP -- still very fluid overloaded so hopefully can restart when stable  dialysate changed for low k and phos   cont rx

## 2023-06-04 NOTE — PROGRESS NOTE ADULT - SUBJECTIVE AND OBJECTIVE BOX
24 hr events:  ON: Repeat Hgb 8.8 (8.4). Cuff leak  - needed to be adjusted twice. ETT unchanged on x-ray. MAps high 50's. Precedex weaned, CVVHD run even,   6/3: TPN ordered, Extubated, but copious secretions (oral and NT suctioned) and increased WOB, so re-intubated and back on Levophed while on sedation, confirmed on CXR. Noon BMP ok, CBC hg 8.4 (8.5), ABG wnl. Trickle TF started.       MEDICATIONS  (STANDING):  acetylcysteine 20%  Inhalation 4 milliLiter(s) Inhalation every 6 hours  albuterol/ipratropium for Nebulization 3 milliLiter(s) Nebulizer every 6 hours  artificial  tears Solution 1 Drop(s) Both EYES two times a day  chlorhexidine 0.12% Liquid 15 milliLiter(s) Oral Mucosa every 12 hours  chlorhexidine 2% Cloths 1 Application(s) Topical daily  chlorhexidine 4% Liquid 1 Application(s) Topical <User Schedule>  CRRT Treatment    <Continuous>  dexMEDEtomidine Infusion 0.2 MICROgram(s)/kG/Hr (3.5 mL/Hr) IV Continuous <Continuous>  dextrose 50% Injectable 25 Gram(s) IV Push once  fentaNYL   Infusion... 0.5 MICROgram(s)/kG/Hr (1.75 mL/Hr) IV Continuous <Continuous>  glucagon  Injectable 1 milliGRAM(s) IntraMuscular once  lacosamide IVPB 250 milliGRAM(s) IV Intermittent every 12 hours  lipid, fat emulsion (Fish Oil and Plant Based) 20% Infusion 0.7143 Gm/kG/Day (20.8 mL/Hr) IV Continuous <Continuous>  lipid, fat emulsion (Fish Oil and Plant Based) 20% Infusion 0.7 Gm/kG/Day (20.4 mL/Hr) IV Continuous <Continuous>  meropenem  IVPB 1000 milliGRAM(s) IV Intermittent every 8 hours  norepinephrine Infusion 0.05 MICROgram(s)/kG/Min (6.56 mL/Hr) IV Continuous <Continuous>  pantoprazole  Injectable 40 milliGRAM(s) IV Push daily  Parenteral Nutrition - Adult 1 Each (70 mL/Hr) TPN Continuous <Continuous>  Parenteral Nutrition - Adult 1 Each (70 mL/Hr) TPN Continuous <Continuous>  petrolatum Ophthalmic Ointment 1 Application(s) Both EYES daily  potassium phosphate / sodium phosphate Powder (PHOS-NaK) 1 Packet(s) Oral once  PureFlow Dialysate RFP-401 (K 4 / Ca 3) 5000 milliLiter(s) (1500 mL/Hr) CRRT <Continuous>  sodium chloride 3%  Inhalation 4 milliLiter(s) Inhalation every 6 hours    MEDICATIONS  (PRN):  dextrose Oral Gel 15 Gram(s) Oral once PRN Blood Glucose LESS THAN 70 milliGRAM(s)/deciliter  sodium chloride 0.9% lock flush 10 milliLiter(s) IV Push every 1 hour PRN Pre/post blood products, medications, blood draw, and to maintain line patency      ICU Vital Signs Last 24 Hrs  T(C): 37 (04 Jun 2023 09:15), Max: 37 (04 Jun 2023 09:15)  T(F): 98.6 (04 Jun 2023 09:15), Max: 98.6 (04 Jun 2023 09:15)  HR: 92 (04 Jun 2023 09:00) (92 - 134)  BP: 76/47 (04 Jun 2023 07:00) (76/47 - 129/97)  BP(mean): 57 (04 Jun 2023 07:00) (57 - 97)  ABP: 122/65 (04 Jun 2023 09:00) (78/43 - 156/90)  ABP(mean): 87 (04 Jun 2023 09:00) (54 - 117)  RR: 18 (04 Jun 2023 09:00) (11 - 27)  SpO2: 100% (04 Jun 2023 09:00) (93% - 100%)    O2 Parameters below as of 04 Jun 2023 09:00  Patient On (Oxygen Delivery Method): ventilator    O2 Concentration (%): 40        Physical Exam:  GENERAL: NAD, resting comfortably in bed  HEENT: NCAT, MMM, torticollis favoring the L side, RIJ, L subclav  C/V: Mild sinus tachycardia, normal peripheral perfusion  PULM: Nonlabored breathing on CPAP ventilator, no respiratory distress, Mode: CPAP with PS, FiO2: 40, PEEP: 5, PS: 5, MAP: 6.3, PIP: 10  ABD: Soft, no rebound tenderness, no guarding, midline incision with island dressing has serosanguinous output, 3 CODIE drain (serosanguinous output), 2 IR drains (bilious), J-tube with minimal dark output  :  Condom cath  EXTREM: WWP, BLE 3+ pitting edema, SCDs in place    Vent settings:  Mode: AC/ CMV (Assist Control/ Continuous Mandatory Ventilation), RR (machine): 18, TV (machine): 500, FiO2: 40, PEEP: 5, ITime: 1, MAP: 7, PIP: 11    I&O's Summary    03 Jun 2023 07:01  -  04 Jun 2023 07:00  --------------------------------------------------------  IN: 3108.6 mL / OUT: 4701 mL / NET: -1592.4 mL    04 Jun 2023 07:01  -  04 Jun 2023 09:39  --------------------------------------------------------  IN: 433.3 mL / OUT: 60 mL / NET: 373.3 mL        LABS:                        8.1    11.68 )-----------( 45       ( 04 Jun 2023 05:28 )             23.8     06-04    135  |  102  |  28<H>  ----------------------------<  129<H>  3.5   |  25  |  0.74    Ca    7.9<L>      04 Jun 2023 05:28  Phos  1.8     06-04  Mg     2.0     06-04    TPro  5.3<L>  /  Alb  2.3<L>  /  TBili  1.8<H>  /  DBili  x   /  AST  20  /  ALT  9<L>  /  AlkPhos  236<H>  06-04    PT/INR - ( 04 Jun 2023 05:28 )   PT: 14.5 sec;   INR: 1.22          PTT - ( 04 Jun 2023 05:28 )  PTT:34.7 sec    CAPILLARY BLOOD GLUCOSE        LIVER FUNCTIONS - ( 04 Jun 2023 05:28 )  Alb: 2.3 g/dL / Pro: 5.3 g/dL / ALK PHOS: 236 U/L / ALT: 9 U/L / AST: 20 U/L / GGT: x

## 2023-06-04 NOTE — PROGRESS NOTE ADULT - ASSESSMENT
53yMale w/ PMHx of HTN, type A aortic dissection s/p Dacron grafts, AV resuspension in 2013, CAD s/p CABG x 1 SVG to RCA (5/2013 with Dr. Medina), seizure disorder, recent admission 3/20-4/14 for replacement of transverse aortic arch, second stage TEVAR and aorto-axillary bypass, AV replacement (bio 23mm), and CABG x 1 (SVG-RCA). Pt found to have endo leak and taken to OR for TEVAR revision on 5/5, Post op course c/b Klebsiella bacteremia (5/15), resp failure requiring intubation on 5/18, Mucus plugging s/p Bronch 5/19 and PEA arrest. Post operatively pt also found to have hemorrhagic pancreatitis with venu-pancreatic abscess possibly 2* to Depakote therefore s/p IR aspiration of peripancreatic fluid collection and paracentesis on 5/22, IR venu-pancreatic abscess drainage and placement of drainage catheter on 5/24. Pt then transferred from CTICU to SICU for further mgmt of hemorrhagic pancreatitis on 5/25. s/p IR placement of 2 new drainage catheters and catheter exchange on 5/26. LUQ drainage 5/30. OR 5/31 exlap washout & replacement of 3 new JPs and abthera vac with open abdomen. RTOR 6/1, no bleeding, evac of old blood, placement of feeding J-tube.     NEURO: Sedation: Fentanyl gtts, Precedex gtt. Hx seizures: epilepsy recs appreciated, Cont vimpat. Depakote weaned off due to concerns for drug-related hemorrhagic pancreatitis.   HEENT: Artificial tears  CV: s/p PEA arrest on 5/24 night. Echo from 5/19 hyperdynamic LV, SHAJI with LVOTO (gradient 26), normal RV, mild MR, no pulm HTN. ASA 81mg held 2/2 to acute blood loss anemia.    PULM: failed extubation 6/3: back on AC 40/500/22/8 with daily CPAP trial 5/5, plan for bedside trach w/ pulm 6/5. ARDS resolved - off NO, s/p multiple Mucus plug/ Lung collapse s/p bronch x3 (most recently on 5/26) most likely from outward obstruction: Cont rotating pt around the clock. Cont Duonebs, Mucomyst, 3% saline.  GI/FEN: TPN, Trickle TF Nepro @20 via feeding J-tube, Protonix BID, Hemorrhagic pancreatitis: s/p Multiple Drain placements and paracentisis by IR team.  FOBT+ 5/29. Constipation: resolved, now diarrhea: d/c Rectal tube 6/4  : TREY on CVVHD - now net even to positive for likely hypovolume. Nephro following. Cantrell d/c'ed 5/26 - Continue bladder scan daily for poss straight cath.   HEME: Acute blood loss anemia on 5/12 requiring 11units of PRBC. IR negative for mesenteric extrav. s/p 5u pPRBC since transfer to SICU, 5/31: RBC 7U, 4 PLT, 1 FFP.  intraabdominal bleed since OR, s/p 1U PRBC intraop and 1U PRBC and 1u plt this morning - HSQ held. D/C TXA BID (5/31-6/1),1U PRBC & 2u PLT 6/1  ENDO: mISS  ID: Chloe (5/21-), ID following. Kleb bacteremia from 5/15 & 5/17, subsequent bld cx negative, /// DC: Caspo (5/23-5/30), Ampicillin ( 5/21- 5/27). PNA w/ bronch cx kleb, enterobacter (zosyn, erta resistant), E faecalis, strep angionosus from 5/19, pancreatic abscess cx pan-sensitive kleb 5/22.   PPX: SCDs, SQH on hold.   LINES: L rad kalpana (5/31--), Lsubclav HD Cath (5/31--), RIJ TLC (6/1--) LIJ TLC (5/23--) // DC: RIJ TLC (5/22-5/27), RIJ HD cath (5/22-31), R Ax kalpana(5/12-5/31)  WOUNDS/DRAINS: right OR CODIE, 2 left OR CODIE, left IR hematoma drain, left IR pancreatic drain (bilious) to BID drainage w/ 250 NS.   PT: PT/OR ordered on 5/28, re-order when off sedation.   Dispo: SICU

## 2023-06-04 NOTE — PROGRESS NOTE ADULT - ASSESSMENT
53-year-old male with PMHx of HTN, Type A aortic dissection s/p Dacron grafts, AV resuspension in 2013, CAD s/p CABG x 1 SVG to RCA (05/2013, Dr. Medina), seizure disorder (last episode on 07/04/2022) S/P replacement of transverse aortic arch, second stage thoracic endovascular aortic repair, aorto-axillary bypass, AV replacement (bio 23mm), in APr 2023 and CABG x 1 (SVG-RCA) EF 75% (Ignacio, 03/2020) now s/p 2nd state TEVAR (05/05/2023) prompting General Surgery consult for acute pancreatitis with large peripancreatic/perigastric fluid collections on CTA abdomen 05/08/2023 followed by acute hemorrhage starting 05/12/2023 requiring 11 U pRBC, but with negative mesenteric angiogram 05/13/2023 prompting Hepatobiliary Surgery reconsult for possible hemorrhagic pancreatitis, 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  Patient is s/p OR washout (05/31/2023), drains (RP, LUQ, pelvis) placement, and abthera wound vac.      PLAN  - Wean off sedation  - Wean off pressors  - CPAP trial and extubate when appropriate  - Continue TPN and NPO status  - Cont feeds through J tube  - Transfusion for Hgb < 8 as needed  - Appreciate SICU care   53-year-old male with PMHx of HTN, Type A aortic dissection s/p Dacron grafts, AV resuspension in 2013, CAD s/p CABG x 1 SVG to RCA (05/2013, Dr. Medina), seizure disorder (last episode on 07/04/2022) S/P replacement of transverse aortic arch, second stage thoracic endovascular aortic repair, aorto-axillary bypass, AV replacement (bio 23mm), in APr 2023 and CABG x 1 (SVG-RCA) EF 75% (Ignacio, 03/2020) now s/p 2nd state TEVAR (05/05/2023) prompting General Surgery consult for acute pancreatitis with large peripancreatic/perigastric fluid collections on CTA abdomen 05/08/2023 followed by acute hemorrhage starting 05/12/2023 requiring 11 U pRBC, but with negative mesenteric angiogram 05/13/2023 prompting Hepatobiliary Surgery reconsult for possible hemorrhagic pancreatitis, 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  Patient is s/p OR washout (05/31/2023), drains (RP, LUQ, pelvis) placement, and abthera wound vac.      PLAN  - Wean off sedation  - Wean off pressors  - CPAP trial and extubate when appropriate  - Continue TPN and NPO status  - Cont feeds through J tube, goal rate of 20ml/hr   - Transfusion for Hgb < 8 as needed  -possible bedside versus OR trach   -begin irrigate through left pancreatic drain, 250NS over 3 hours BID. If notice distension or change in clinical presentation, please stop irrigation   -no hyperosmolar feeding   - Appreciate SICU care

## 2023-06-04 NOTE — PROGRESS NOTE ADULT - SUBJECTIVE AND OBJECTIVE BOX
Patient seen bedside, brought to the OR 6/1 for washout and abdominal closure. Drain outputs are as follows:  All drains flushed with 5 cc of NS without resistance.     1. 16 Fr teal LUQ peripancreatic abscess "Left Pancreatic CODIE Drain" placed on 5/24:   -80 cc serosanguinous output in 24 hrs. 155 cc in previous 24 hrs.     2. 16 Fr teal LUQ hematoma "Left Abdominal Hematoma CODIE Drain" placed on 5/25 and upsized on 5/26:   -15 cc dark sanguinous output in 24 hrs. 120 cc in previous 24 hrs.

## 2023-06-04 NOTE — PROGRESS NOTE ADULT - ATTENDING COMMENTS
Acute hypoxic respiratory failure (intubated) with critical care myopathy/neuropathy with Aortic dissection s/p repair with endo leak with nette-pancreatic abscess s/p drainage with acute renal failure (on CVVHD). D/c CVVHD today and observe. Shock resolved. Continue Meropenem. . Rest as above

## 2023-06-04 NOTE — PROGRESS NOTE ADULT - ASSESSMENT
53M h/o seizure d/o, aortic dissection s/p AVR, aortic graft revision 3/20/23, 2nd stage TEVAR 5/5/23, course c/b peripancreatic/RP hemorrhage/hematoma formation s/p multiple washout .  All cultures grew K.pneumo.  Patient unable to tolerate extubation due to secretion.  Pressor requirement coming down now.     - cont meropenem 1g IV q8h  - if he is off pressor, then ok to switch to CTX 2g IV q24h  - f/u repeat cultures      Team 1 will follow you.  Dr Nicolas will resume care on Monday.  Case d/w primary team.    Reyna Martinez MD, MS  Infectious Disease attending  work cell [phone]   For any questions during evening/weekend/holiday, please page ID on call

## 2023-06-04 NOTE — PROGRESS NOTE ADULT - SUBJECTIVE AND OBJECTIVE BOX
--------------------------------------------------------------------------------  Chief Complaint: ESRD/Ongoing hemodialysis requirement    24 hour events/subjective:    Seen this morning, now on a touch of levophed given episode of hypotension. Overall remains volume overloaded. Discussed with team, we will keep on CVVHD. However during the day periodically may need to switch to net even and then net negative to allow patient to equilibrate or lower rate of removal. Team aware.     PAST HISTORY  --------------------------------------------------------------------------------  No significant changes to PMH, PSH, FHx, SHx, unless otherwise noted    ALLERGIES & MEDICATIONS  --------------------------------------------------------------------------------  Allergies    No Known Allergies    Intolerances      Standing Inpatient Medications  acetylcysteine 20%  Inhalation 4 milliLiter(s) Inhalation every 6 hours  albuterol/ipratropium for Nebulization 3 milliLiter(s) Nebulizer every 6 hours  artificial  tears Solution 1 Drop(s) Both EYES two times a day  chlorhexidine 0.12% Liquid 15 milliLiter(s) Oral Mucosa every 12 hours  chlorhexidine 2% Cloths 1 Application(s) Topical daily  chlorhexidine 4% Liquid 1 Application(s) Topical <User Schedule>  CRRT Treatment    <Continuous>  CRRT Treatment    <Continuous>  dexMEDEtomidine Infusion 0.2 MICROgram(s)/kG/Hr IV Continuous <Continuous>  dextrose 50% Injectable 25 Gram(s) IV Push once  fentaNYL   Infusion... 0.5 MICROgram(s)/kG/Hr IV Continuous <Continuous>  glucagon  Injectable 1 milliGRAM(s) IntraMuscular once  lacosamide IVPB 250 milliGRAM(s) IV Intermittent every 12 hours  lipid, fat emulsion (Fish Oil and Plant Based) 20% Infusion 0.7 Gm/kG/Day IV Continuous <Continuous>  meropenem  IVPB 1000 milliGRAM(s) IV Intermittent every 8 hours  norepinephrine Infusion 0.05 MICROgram(s)/kG/Min IV Continuous <Continuous>  pantoprazole  Injectable 40 milliGRAM(s) IV Push daily  Parenteral Nutrition - Adult 1 Each TPN Continuous <Continuous>  Parenteral Nutrition - Adult 1 Each TPN Continuous <Continuous>  petrolatum Ophthalmic Ointment 1 Application(s) Both EYES daily  Phoxillum Filtration BK 4 / 2.5 5000 milliLiter(s) CRRT <Continuous>  PureFlow Dialysate RFP-401 (K 4 / Ca 3) 5000 milliLiter(s) CRRT <Continuous>  sodium chloride 3%  Inhalation 4 milliLiter(s) Inhalation every 6 hours    PRN Inpatient Medications  dextrose Oral Gel 15 Gram(s) Oral once PRN  sodium chloride 0.9% lock flush 10 milliLiter(s) IV Push every 1 hour PRN      REVIEW OF SYSTEMS  --------------------------------------------------------------------------------  All other systems were reviewed and are negative, except as noted.    VITALS/PHYSICAL EXAM  --------------------------------------------------------------------------------  T(C): 37 (06-04-23 @ 09:15), Max: 37 (06-04-23 @ 09:15)  HR: 102 (06-04-23 @ 12:00) (91 - 134)  BP: 76/47 (06-04-23 @ 07:00) (76/47 - 129/97)  RR: 13 (06-04-23 @ 12:00) (13 - 26)  SpO2: 100% (06-04-23 @ 12:00) (93% - 100%)  Wt(kg): --  Drug Dosing Weight  Height (cm): 182.9 (01 Jun 2023 10:29)  Weight (kg): 70 (01 Jun 2023 10:29)  BMI (kg/m2): 20.9 (01 Jun 2023 10:29)  BSA (m2): 1.91 (01 Jun 2023 10:29)        06-03-23 @ 07:01  -  06-04-23 @ 07:00  --------------------------------------------------------  IN: 3108.6 mL / OUT: 4701 mL / NET: -1592.4 mL    06-04-23 @ 07:01  -  06-04-23 @ 12:59  --------------------------------------------------------  IN: 736.1 mL / OUT: 321 mL / NET: 415.1 mL    PHYSICAL EXAM:  GENERAL: intubated and sedated  CHEST/LUNG: mechanical breath sounds ; no accessory muscle use   HEART: normal S1S2, RRR  ABDOMEN: Soft, Nontender, +BS,   EXTREMITIES: + edema BL LE UE   SKIN: no lesions or rashes   ACCESS: L subclavian HD cath (placed 5/31)    LABS/STUDIES  --------------------------------------------------------------------------------              8.1    11.68 >-----------<  45       [06-04-23 @ 05:28]              23.8     135  |  102  |  28  ----------------------------<  129      [06-04-23 @ 05:28]  3.5   |  25  |  0.74        Ca     7.9     [06-04-23 @ 05:28]      Mg     2.0     [06-04-23 @ 05:28]      Phos  1.8     [06-04-23 @ 05:28]    TPro  5.3  /  Alb  2.3  /  TBili  1.8  /  DBili  x   /  AST  20  /  ALT  9   /  AlkPhos  236  [06-04-23 @ 05:28]    PT/INR: PT 14.5 , INR 1.22       [06-04-23 @ 05:28]  PTT: 34.7       [06-04-23 @ 05:28]      TSH 2.650      [05-05-23 @ 07:25]  Lipid: chol --, , HDL --, LDL --      [06-01-23 @ 05:30]        RADIOLOGY:  --------------------------------------------------------------------------------------    Hemoglobin: 8.1 g/dL (06-04-23 @ 05:28)  Phosphorus Level, Serum: 1.8 mg/dL (06-04-23 @ 05:28)  Hemoglobin: 8.8 g/dL (06-04-23 @ 00:03)  Phosphorus Level, Serum: 2.5 mg/dL (06-03-23 @ 19:21)    Albumin, Serum: 2.3 g/dL (06-04-23 @ 05:28)  Albumin, Serum: 2.3 g/dL (06-03-23 @ 05:13)    T(C): 37 (06-04-23 @ 09:15), Max: 37 (06-04-23 @ 09:15)  HR: 102 (06-04-23 @ 12:00) (91 - 134)  BP: 76/47 (06-04-23 @ 07:00) (76/47 - 129/97)  RR: 13 (06-04-23 @ 12:00) (13 - 26)  SpO2: 100% (06-04-23 @ 12:00) (93% - 100%)      CRRT Treatment:   Modality: CVVHD, Filter: NxStage CAR-505, Target Blood Flow: 300 mL/Min  Target Fluid Balance: Titrate to net EVEN fluid balance (06-04-23 @ 12:59) [Active]  Phoxillum Filtration BK 4 / 2.5: Solution, 5000 milliLiter(s) infuse at 1500 mL/Hr; infuse through CRRT Circuit  Administration Instructions: Continuous Renal Replacement Therapy (CRRT)  Special Instructions: Dialysate (06-04-23 @ 12:59) [Active]

## 2023-06-05 ENCOUNTER — TRANSCRIPTION ENCOUNTER (OUTPATIENT)
Age: 53
End: 2023-06-05

## 2023-06-05 ENCOUNTER — FORM ENCOUNTER (OUTPATIENT)
Age: 53
End: 2023-06-05

## 2023-06-05 LAB
ALBUMIN SERPL ELPH-MCNC: 2.3 G/DL — LOW (ref 3.3–5)
ALP SERPL-CCNC: 195 U/L — HIGH (ref 40–120)
ALT FLD-CCNC: 11 U/L — SIGNIFICANT CHANGE UP (ref 10–45)
ANION GAP SERPL CALC-SCNC: 6 MMOL/L — SIGNIFICANT CHANGE UP (ref 5–17)
ANION GAP SERPL CALC-SCNC: 8 MMOL/L — SIGNIFICANT CHANGE UP (ref 5–17)
ANISOCYTOSIS BLD QL: SLIGHT — SIGNIFICANT CHANGE UP
APTT BLD: 33.4 SEC — SIGNIFICANT CHANGE UP (ref 27.5–35.5)
AST SERPL-CCNC: 21 U/L — SIGNIFICANT CHANGE UP (ref 10–40)
BASE EXCESS BLDA CALC-SCNC: 1.9 MMOL/L — SIGNIFICANT CHANGE UP (ref -2–3)
BASOPHILS # BLD AUTO: 0 K/UL — SIGNIFICANT CHANGE UP (ref 0–0.2)
BASOPHILS NFR BLD AUTO: 0 % — SIGNIFICANT CHANGE UP (ref 0–2)
BILIRUB SERPL-MCNC: 1.4 MG/DL — HIGH (ref 0.2–1.2)
BLD GP AB SCN SERPL QL: NEGATIVE — SIGNIFICANT CHANGE UP
BUN SERPL-MCNC: 27 MG/DL — HIGH (ref 7–23)
BUN SERPL-MCNC: 28 MG/DL — HIGH (ref 7–23)
CALCIUM SERPL-MCNC: 7.6 MG/DL — LOW (ref 8.4–10.5)
CALCIUM SERPL-MCNC: 7.9 MG/DL — LOW (ref 8.4–10.5)
CHLORIDE SERPL-SCNC: 103 MMOL/L — SIGNIFICANT CHANGE UP (ref 96–108)
CHLORIDE SERPL-SCNC: 104 MMOL/L — SIGNIFICANT CHANGE UP (ref 96–108)
CO2 BLDA-SCNC: 28 MMOL/L — HIGH (ref 19–24)
CO2 SERPL-SCNC: 25 MMOL/L — SIGNIFICANT CHANGE UP (ref 22–31)
CO2 SERPL-SCNC: 26 MMOL/L — SIGNIFICANT CHANGE UP (ref 22–31)
CREAT SERPL-MCNC: 0.63 MG/DL — SIGNIFICANT CHANGE UP (ref 0.5–1.3)
CREAT SERPL-MCNC: 0.69 MG/DL — SIGNIFICANT CHANGE UP (ref 0.5–1.3)
CULTURE RESULTS: SIGNIFICANT CHANGE UP
CULTURE RESULTS: SIGNIFICANT CHANGE UP
EGFR: 110 ML/MIN/1.73M2 — SIGNIFICANT CHANGE UP
EGFR: 113 ML/MIN/1.73M2 — SIGNIFICANT CHANGE UP
EOSINOPHIL # BLD AUTO: 1.68 K/UL — HIGH (ref 0–0.5)
EOSINOPHIL NFR BLD AUTO: 15.8 % — HIGH (ref 0–6)
FIBRINOGEN PPP-MCNC: 314 MG/DL — SIGNIFICANT CHANGE UP (ref 200–445)
GIANT PLATELETS BLD QL SMEAR: PRESENT — SIGNIFICANT CHANGE UP
GLUCOSE SERPL-MCNC: 110 MG/DL — HIGH (ref 70–99)
GLUCOSE SERPL-MCNC: 123 MG/DL — HIGH (ref 70–99)
HCO3 BLDA-SCNC: 27 MMOL/L — SIGNIFICANT CHANGE UP (ref 21–28)
HCT VFR BLD CALC: 20.8 % — CRITICAL LOW (ref 39–50)
HCT VFR BLD CALC: 25.4 % — LOW (ref 39–50)
HGB BLD-MCNC: 6.9 G/DL — CRITICAL LOW (ref 13–17)
HGB BLD-MCNC: 8.2 G/DL — LOW (ref 13–17)
HYPOCHROMIA BLD QL: SLIGHT — SIGNIFICANT CHANGE UP
INR BLD: 1.32 — HIGH (ref 0.88–1.16)
LYMPHOCYTES # BLD AUTO: 0.84 K/UL — LOW (ref 1–3.3)
LYMPHOCYTES # BLD AUTO: 7.9 % — LOW (ref 13–44)
MACROCYTES BLD QL: SLIGHT — SIGNIFICANT CHANGE UP
MAGNESIUM SERPL-MCNC: 2 MG/DL — SIGNIFICANT CHANGE UP (ref 1.6–2.6)
MAGNESIUM SERPL-MCNC: 2 MG/DL — SIGNIFICANT CHANGE UP (ref 1.6–2.6)
MANUAL SMEAR VERIFICATION: SIGNIFICANT CHANGE UP
MCHC RBC-ENTMCNC: 28.8 PG — SIGNIFICANT CHANGE UP (ref 27–34)
MCHC RBC-ENTMCNC: 30 PG — SIGNIFICANT CHANGE UP (ref 27–34)
MCHC RBC-ENTMCNC: 32.3 GM/DL — SIGNIFICANT CHANGE UP (ref 32–36)
MCHC RBC-ENTMCNC: 33.2 GM/DL — SIGNIFICANT CHANGE UP (ref 32–36)
MCV RBC AUTO: 89.1 FL — SIGNIFICANT CHANGE UP (ref 80–100)
MCV RBC AUTO: 90.4 FL — SIGNIFICANT CHANGE UP (ref 80–100)
MICROCYTES BLD QL: SLIGHT — SIGNIFICANT CHANGE UP
MONOCYTES # BLD AUTO: 0.47 K/UL — SIGNIFICANT CHANGE UP (ref 0–0.9)
MONOCYTES NFR BLD AUTO: 4.4 % — SIGNIFICANT CHANGE UP (ref 2–14)
NEUTROPHILS # BLD AUTO: 7.64 K/UL — HIGH (ref 1.8–7.4)
NEUTROPHILS NFR BLD AUTO: 71.9 % — SIGNIFICANT CHANGE UP (ref 43–77)
NRBC # BLD: 0 /100 WBCS — SIGNIFICANT CHANGE UP (ref 0–0)
OVALOCYTES BLD QL SMEAR: SLIGHT — SIGNIFICANT CHANGE UP
PCO2 BLDA: 41 MMHG — SIGNIFICANT CHANGE UP (ref 35–48)
PH BLDA: 7.42 — SIGNIFICANT CHANGE UP (ref 7.35–7.45)
PHOSPHATE SERPL-MCNC: 3.4 MG/DL — SIGNIFICANT CHANGE UP (ref 2.5–4.5)
PHOSPHATE SERPL-MCNC: 4 MG/DL — SIGNIFICANT CHANGE UP (ref 2.5–4.5)
PLAT MORPH BLD: ABNORMAL
PLATELET # BLD AUTO: 38 K/UL — LOW (ref 150–400)
PLATELET # BLD AUTO: 75 K/UL — LOW (ref 150–400)
PO2 BLDA: 122 MMHG — HIGH (ref 83–108)
POLYCHROMASIA BLD QL SMEAR: SLIGHT — SIGNIFICANT CHANGE UP
POTASSIUM SERPL-MCNC: 4.1 MMOL/L — SIGNIFICANT CHANGE UP (ref 3.5–5.3)
POTASSIUM SERPL-MCNC: 4.5 MMOL/L — SIGNIFICANT CHANGE UP (ref 3.5–5.3)
POTASSIUM SERPL-SCNC: 4.1 MMOL/L — SIGNIFICANT CHANGE UP (ref 3.5–5.3)
POTASSIUM SERPL-SCNC: 4.5 MMOL/L — SIGNIFICANT CHANGE UP (ref 3.5–5.3)
PROT SERPL-MCNC: 5.1 G/DL — LOW (ref 6–8.3)
PROTHROM AB SERPL-ACNC: 15.7 SEC — HIGH (ref 10.5–13.4)
RBC # BLD: 2.3 M/UL — LOW (ref 4.2–5.8)
RBC # BLD: 2.85 M/UL — LOW (ref 4.2–5.8)
RBC # FLD: 15.4 % — HIGH (ref 10.3–14.5)
RBC # FLD: 16.7 % — HIGH (ref 10.3–14.5)
RBC BLD AUTO: ABNORMAL
RH IG SCN BLD-IMP: POSITIVE — SIGNIFICANT CHANGE UP
SAO2 % BLDA: 98.9 % — HIGH (ref 94–98)
SODIUM SERPL-SCNC: 135 MMOL/L — SIGNIFICANT CHANGE UP (ref 135–145)
SODIUM SERPL-SCNC: 137 MMOL/L — SIGNIFICANT CHANGE UP (ref 135–145)
SPECIMEN SOURCE: SIGNIFICANT CHANGE UP
SPECIMEN SOURCE: SIGNIFICANT CHANGE UP
TRIGL SERPL-MCNC: 88 MG/DL — SIGNIFICANT CHANGE UP
WBC # BLD: 10.63 K/UL — HIGH (ref 3.8–10.5)
WBC # BLD: 11.55 K/UL — HIGH (ref 3.8–10.5)
WBC # FLD AUTO: 10.63 K/UL — HIGH (ref 3.8–10.5)
WBC # FLD AUTO: 11.55 K/UL — HIGH (ref 3.8–10.5)

## 2023-06-05 PROCEDURE — 99233 SBSQ HOSP IP/OBS HIGH 50: CPT | Mod: 25,GC

## 2023-06-05 PROCEDURE — 31645 BRNCHSC W/THER ASPIR 1ST: CPT | Mod: GC

## 2023-06-05 PROCEDURE — 31600 PLANNED TRACHEOSTOMY: CPT

## 2023-06-05 PROCEDURE — 99233 SBSQ HOSP IP/OBS HIGH 50: CPT | Mod: GC

## 2023-06-05 PROCEDURE — 90947 DIALYSIS REPEATED EVAL: CPT

## 2023-06-05 PROCEDURE — 99232 SBSQ HOSP IP/OBS MODERATE 35: CPT

## 2023-06-05 PROCEDURE — 71045 X-RAY EXAM CHEST 1 VIEW: CPT | Mod: 26,77

## 2023-06-05 PROCEDURE — 74018 RADEX ABDOMEN 1 VIEW: CPT | Mod: 26

## 2023-06-05 PROCEDURE — 99291 CRITICAL CARE FIRST HOUR: CPT

## 2023-06-05 PROCEDURE — 71045 X-RAY EXAM CHEST 1 VIEW: CPT | Mod: 26

## 2023-06-05 RX ORDER — DEXMEDETOMIDINE HYDROCHLORIDE IN 0.9% SODIUM CHLORIDE 4 UG/ML
0.2 INJECTION INTRAVENOUS
Qty: 400 | Refills: 0 | Status: DISCONTINUED | OUTPATIENT
Start: 2023-06-05 | End: 2023-06-06

## 2023-06-05 RX ORDER — ELECTROLYTE SOLUTION,INJ
1 VIAL (ML) INTRAVENOUS
Refills: 0 | Status: DISCONTINUED | OUTPATIENT
Start: 2023-06-05 | End: 2023-06-05

## 2023-06-05 RX ORDER — FENTANYL CITRATE 50 UG/ML
0.5 INJECTION INTRAVENOUS
Qty: 5000 | Refills: 0 | Status: DISCONTINUED | OUTPATIENT
Start: 2023-06-05 | End: 2023-06-06

## 2023-06-05 RX ORDER — MIDAZOLAM HYDROCHLORIDE 1 MG/ML
0.02 INJECTION, SOLUTION INTRAMUSCULAR; INTRAVENOUS
Qty: 100 | Refills: 0 | Status: DISCONTINUED | OUTPATIENT
Start: 2023-06-05 | End: 2023-06-06

## 2023-06-05 RX ORDER — MIDAZOLAM HYDROCHLORIDE 1 MG/ML
4 INJECTION, SOLUTION INTRAMUSCULAR; INTRAVENOUS ONCE
Refills: 0 | Status: DISCONTINUED | OUTPATIENT
Start: 2023-06-05 | End: 2023-06-05

## 2023-06-05 RX ORDER — PROPOFOL 10 MG/ML
20 INJECTION, EMULSION INTRAVENOUS
Qty: 1000 | Refills: 0 | Status: DISCONTINUED | OUTPATIENT
Start: 2023-06-05 | End: 2023-06-05

## 2023-06-05 RX ORDER — LIDOCAINE HYDROCHLORIDE AND EPINEPHRINE 10; 10 MG/ML; UG/ML
20 INJECTION, SOLUTION INFILTRATION; PERINEURAL ONCE
Refills: 0 | Status: COMPLETED | OUTPATIENT
Start: 2023-06-05 | End: 2023-06-05

## 2023-06-05 RX ORDER — CISATRACURIUM BESYLATE 2 MG/ML
20 INJECTION INTRAVENOUS ONCE
Refills: 0 | Status: COMPLETED | OUTPATIENT
Start: 2023-06-05 | End: 2023-06-05

## 2023-06-05 RX ORDER — I.V. FAT EMULSION 20 G/100ML
0.7 EMULSION INTRAVENOUS
Qty: 50 | Refills: 0 | Status: DISCONTINUED | OUTPATIENT
Start: 2023-06-05 | End: 2023-06-05

## 2023-06-05 RX ORDER — CISATRACURIUM BESYLATE 2 MG/ML
15 INJECTION INTRAVENOUS ONCE
Refills: 0 | Status: COMPLETED | OUTPATIENT
Start: 2023-06-05 | End: 2023-06-05

## 2023-06-05 RX ORDER — CEFTRIAXONE 500 MG/1
2000 INJECTION, POWDER, FOR SOLUTION INTRAMUSCULAR; INTRAVENOUS EVERY 24 HOURS
Refills: 0 | Status: DISCONTINUED | OUTPATIENT
Start: 2023-06-05 | End: 2023-06-08

## 2023-06-05 RX ADMIN — Medication 3 MILLILITER(S): at 09:36

## 2023-06-05 RX ADMIN — Medication 3 MILLILITER(S): at 15:27

## 2023-06-05 RX ADMIN — Medication 1 EACH: at 18:42

## 2023-06-05 RX ADMIN — CISATRACURIUM BESYLATE 15 MILLIGRAM(S): 2 INJECTION INTRAVENOUS at 10:14

## 2023-06-05 RX ADMIN — Medication 4 MILLILITER(S): at 05:42

## 2023-06-05 RX ADMIN — MIDAZOLAM HYDROCHLORIDE 4 MILLIGRAM(S): 1 INJECTION, SOLUTION INTRAMUSCULAR; INTRAVENOUS at 17:45

## 2023-06-05 RX ADMIN — Medication 3 MILLILITER(S): at 05:42

## 2023-06-05 RX ADMIN — I.V. FAT EMULSION 20.83 GM/KG/DAY: 20 EMULSION INTRAVENOUS at 18:58

## 2023-06-05 RX ADMIN — Medication 4 MILLILITER(S): at 09:42

## 2023-06-05 RX ADMIN — Medication 1 EACH: at 18:57

## 2023-06-05 RX ADMIN — Medication 6.56 MICROGRAM(S)/KG/MIN: at 20:37

## 2023-06-05 RX ADMIN — Medication 4 MILLILITER(S): at 15:28

## 2023-06-05 RX ADMIN — Medication 1 APPLICATION(S): at 11:32

## 2023-06-05 RX ADMIN — CHLORHEXIDINE GLUCONATE 15 MILLILITER(S): 213 SOLUTION TOPICAL at 17:45

## 2023-06-05 RX ADMIN — LACOSAMIDE 150 MILLIGRAM(S): 50 TABLET ORAL at 17:42

## 2023-06-05 RX ADMIN — CEFTRIAXONE 100 MILLIGRAM(S): 500 INJECTION, POWDER, FOR SOLUTION INTRAMUSCULAR; INTRAVENOUS at 14:15

## 2023-06-05 RX ADMIN — MIDAZOLAM HYDROCHLORIDE 4 MILLIGRAM(S): 1 INJECTION, SOLUTION INTRAMUSCULAR; INTRAVENOUS at 18:57

## 2023-06-05 RX ADMIN — DEXMEDETOMIDINE HYDROCHLORIDE IN 0.9% SODIUM CHLORIDE 3.5 MICROGRAM(S)/KG/HR: 4 INJECTION INTRAVENOUS at 22:13

## 2023-06-05 RX ADMIN — CHLORHEXIDINE GLUCONATE 1 APPLICATION(S): 213 SOLUTION TOPICAL at 11:32

## 2023-06-05 RX ADMIN — CISATRACURIUM BESYLATE 20 MILLIGRAM(S): 2 INJECTION INTRAVENOUS at 17:44

## 2023-06-05 RX ADMIN — SODIUM CHLORIDE 4 MILLILITER(S): 9 INJECTION INTRAMUSCULAR; INTRAVENOUS; SUBCUTANEOUS at 05:42

## 2023-06-05 RX ADMIN — LIDOCAINE HYDROCHLORIDE AND EPINEPHRINE 20 MILLILITER(S): 10; 10 INJECTION, SOLUTION INFILTRATION; PERINEURAL at 17:49

## 2023-06-05 RX ADMIN — Medication 1 DROP(S): at 17:43

## 2023-06-05 RX ADMIN — SODIUM CHLORIDE 4 MILLILITER(S): 9 INJECTION INTRAMUSCULAR; INTRAVENOUS; SUBCUTANEOUS at 09:42

## 2023-06-05 RX ADMIN — Medication 1 DROP(S): at 05:20

## 2023-06-05 RX ADMIN — PANTOPRAZOLE SODIUM 40 MILLIGRAM(S): 20 TABLET, DELAYED RELEASE ORAL at 11:32

## 2023-06-05 RX ADMIN — DEXMEDETOMIDINE HYDROCHLORIDE IN 0.9% SODIUM CHLORIDE 3.5 MICROGRAM(S)/KG/HR: 4 INJECTION INTRAVENOUS at 05:19

## 2023-06-05 RX ADMIN — MEROPENEM 100 MILLIGRAM(S): 1 INJECTION INTRAVENOUS at 05:20

## 2023-06-05 RX ADMIN — CHLORHEXIDINE GLUCONATE 15 MILLILITER(S): 213 SOLUTION TOPICAL at 05:20

## 2023-06-05 RX ADMIN — SODIUM CHLORIDE 4 MILLILITER(S): 9 INJECTION INTRAMUSCULAR; INTRAVENOUS; SUBCUTANEOUS at 21:03

## 2023-06-05 RX ADMIN — LACOSAMIDE 150 MILLIGRAM(S): 50 TABLET ORAL at 06:08

## 2023-06-05 RX ADMIN — Medication 3 MILLILITER(S): at 21:03

## 2023-06-05 RX ADMIN — SODIUM CHLORIDE 4 MILLILITER(S): 9 INJECTION INTRAMUSCULAR; INTRAVENOUS; SUBCUTANEOUS at 15:27

## 2023-06-05 RX ADMIN — Medication 4 MILLILITER(S): at 21:03

## 2023-06-05 RX ADMIN — I.V. FAT EMULSION 20.83 GM/KG/DAY: 20 EMULSION INTRAVENOUS at 18:41

## 2023-06-05 RX ADMIN — POTASSIUM PHOSPHATE, MONOBASIC POTASSIUM PHOSPHATE, DIBASIC 62.5 MILLIMOLE(S): 236; 224 INJECTION, SOLUTION INTRAVENOUS at 00:08

## 2023-06-05 RX ADMIN — DEXMEDETOMIDINE HYDROCHLORIDE IN 0.9% SODIUM CHLORIDE 3.5 MICROGRAM(S)/KG/HR: 4 INJECTION INTRAVENOUS at 00:15

## 2023-06-05 RX ADMIN — PROPOFOL 8.4 MICROGRAM(S)/KG/MIN: 10 INJECTION, EMULSION INTRAVENOUS at 10:14

## 2023-06-05 NOTE — PROGRESS NOTE ADULT - SUBJECTIVE AND OBJECTIVE BOX
INTERVAL HPI/OVERNIGHT EVENTS:  AM Hgb 6.9 with Plt 38k. Planned for bedside trach today after transfusions. Patient seen and examined at bedside. Still on sedation, but opens eyes spontaneously. ROS unable to be obtained.     VITALS  Vital Signs Last 24 Hrs  T(C): 36.5 (05 Jun 2023 12:00), Max: 37 (04 Jun 2023 22:04)  T(F): 97.7 (05 Jun 2023 12:00), Max: 98.6 (04 Jun 2023 22:04)  HR: 83 (05 Jun 2023 15:07) (69 - 121)  BP: --  BP(mean): --  RR: 18 (05 Jun 2023 15:07) (9 - 61)  SpO2: 100% (05 Jun 2023 15:07) (99% - 100%)    Parameters below as of 05 Jun 2023 15:07  Patient On (Oxygen Delivery Method): ventilator    O2 Concentration (%): 40    CAPILLARY BLOOD GLUCOSE          PHYSICAL EXAM  General: intubated and sedated  HEENT: NC/AT, no scleral icterus  Neck: Supple, no JVD  Respiratory: lungs CTA b/l, no accessory muscle use, on CPAP mode w/ ET tube in place  Cardiovascular: Regular rhythm/rate; +S1 +S2, no murmurs  Gastrointestinal: Soft, +BS, anterior abdominal CODIE drains  Extremities: WWP, 2+ pitting edema on all four extremities  Neurological: sedated on vent, opens eyes spontaneously  Skin: Normal temperature, warm, dry    MEDICATIONS  (STANDING):  acetylcysteine 20%  Inhalation 4 milliLiter(s) Inhalation every 6 hours  albuterol/ipratropium for Nebulization 3 milliLiter(s) Nebulizer every 6 hours  artificial  tears Solution 1 Drop(s) Both EYES two times a day  cefTRIAXone   IVPB 2000 milliGRAM(s) IV Intermittent every 24 hours  chlorhexidine 0.12% Liquid 15 milliLiter(s) Oral Mucosa every 12 hours  chlorhexidine 2% Cloths 1 Application(s) Topical daily  chlorhexidine 4% Liquid 1 Application(s) Topical <User Schedule>  cisatracurium Injectable 20 milliGRAM(s) IV Push once  CRRT Treatment    <Continuous>  dexMEDEtomidine Infusion 0.2 MICROgram(s)/kG/Hr (3.5 mL/Hr) IV Continuous <Continuous>  dextrose 50% Injectable 25 Gram(s) IV Push once  fentaNYL   Infusion... 0.5 MICROgram(s)/kG/Hr (1.75 mL/Hr) IV Continuous <Continuous>  glucagon  Injectable 1 milliGRAM(s) IntraMuscular once  lacosamide IVPB 250 milliGRAM(s) IV Intermittent every 12 hours  lipid, fat emulsion (Fish Oil and Plant Based) 20% Infusion 0.7 Gm/kG/Day (20.83 mL/Hr) IV Continuous <Continuous>  norepinephrine Infusion 0.05 MICROgram(s)/kG/Min (6.56 mL/Hr) IV Continuous <Continuous>  pantoprazole  Injectable 40 milliGRAM(s) IV Push daily  Parenteral Nutrition - Adult 1 Each (70 mL/Hr) TPN Continuous <Continuous>  Parenteral Nutrition - Adult 1 Each (70 mL/Hr) TPN Continuous <Continuous>  petrolatum Ophthalmic Ointment 1 Application(s) Both EYES daily  Phoxillum Filtration BK 4 / 2.5 5000 milliLiter(s) (1500 mL/Hr) CRRT <Continuous>  propofol Infusion 20 MICROgram(s)/kG/Min (8.4 mL/Hr) IV Continuous <Continuous>  sodium chloride 3%  Inhalation 4 milliLiter(s) Inhalation every 6 hours    MEDICATIONS  (PRN):  dextrose Oral Gel 15 Gram(s) Oral once PRN Blood Glucose LESS THAN 70 milliGRAM(s)/deciliter  sodium chloride 0.9% lock flush 10 milliLiter(s) IV Push every 1 hour PRN Pre/post blood products, medications, blood draw, and to maintain line patency      No Known Allergies      LABS                        8.2    11.55 )-----------( 75       ( 05 Jun 2023 13:41 )             25.4     06-05    135  |  104  |  28<H>  ----------------------------<  110<H>  4.5   |  25  |  0.63    Ca    7.9<L>      05 Jun 2023 13:41  Phos  4.0     06-05  Mg     2.0     06-05    TPro  5.1<L>  /  Alb  2.3<L>  /  TBili  1.4<H>  /  DBili  x   /  AST  21  /  ALT  11  /  AlkPhos  195<H>  06-05    PT/INR - ( 05 Jun 2023 05:30 )   PT: 15.7 sec;   INR: 1.32          PTT - ( 05 Jun 2023 05:30 )  PTT:33.4 sec          RADIOLOGY & ADDITIONAL TESTS: Reviewed

## 2023-06-05 NOTE — PROGRESS NOTE ADULT - ATTENDING COMMENTS
Agree with above. Patient to go for trach.  Ok to stop meropenem and deescalate to Ceftriaxone 2 grams IV daily.  All isolates have been susceptible

## 2023-06-05 NOTE — PROGRESS NOTE ADULT - ASSESSMENT
· Assessment	  54yo Male pt with PMH HTN, type A aortic dissection s/p Dacron grafts, AV resuspension in 2013, CAD s/p CABG x 1 SVG to RCA (5/2013 with Dr. Medina), seizure disorder (last episode on 7/4/22) S/P replacement of transverse aortic arch, second stage thoracic endovascular aortic repair, aorto-axillary bypass, AV replacement (bio 23mm), in APr 2023 and CABG x 1 (SVG-RCA) EF 75% (Ignacio, 3/20) now s/p 2nd state TEVAR on 5/5 for whom surgery was consulted for finding of acute pancreatitis with large peripancreatic/perigastric fluid collections on CTA abdomen 5/8 then acute hemorrhage starting 5/12/23 requiring 11 U pRBC over last 48 hours but with negative mesenteric angiogram 5/13/23. S/p paracentesis and LUQ collection aspiration (no drain per surgery) on 5/22/23. LUQ collection growing Klebsiella pneumoniae.    5/24/23:  LUQ drain placement by IR.     5/25/23:  LUQ drain upsizing. Placement of two additional 14 F drainage catheters in the left mid and lower abdomen. Placement of a left abdominal hematoma drain.     5/26/23:   Placement of a right ascites drain and right pelvic hematoma/abscess drain by IR. Upsized left abdomen hematoma drain and left pelvic abscess/hematoma drain to 16 Fr.    5/30/23:  LUQ drain placement.     5/31/23:  To OR for ex-lap and abdominal washout. Multiple IR drains removed as described above.     6/1/23:  Return to OR for washout and abdominal closure.     Plan:  -Continue to monitor drain outputs   -IR will continue to follow.

## 2023-06-05 NOTE — PROGRESS NOTE ADULT - SUBJECTIVE AND OBJECTIVE BOX
Patient is a 53y Male seen and evaluated at bedside. Remains intubated, sedated. On low dose of levo this AM. Getting pRBC. On CVVHD, no complications overnight      Meds:    acetylcysteine 20%  Inhalation 4 every 6 hours  albuterol/ipratropium for Nebulization 3 every 6 hours  artificial  tears Solution 1 two times a day  cefTRIAXone   IVPB 2000 every 24 hours  chlorhexidine 0.12% Liquid 15 every 12 hours  chlorhexidine 2% Cloths 1 daily  chlorhexidine 4% Liquid 1 <User Schedule>  CRRT Treatment  <Continuous>  CRRT Treatment  <Continuous>  dexMEDEtomidine Infusion 0.2 <Continuous>  dextrose 50% Injectable 25 once  dextrose Oral Gel 15 once PRN  fentaNYL   Infusion... 0.5 <Continuous>  glucagon  Injectable 1 once  lacosamide IVPB 250 every 12 hours  lipid, fat emulsion (Fish Oil and Plant Based) 20% Infusion 0.7 <Continuous>  norepinephrine Infusion 0.05 <Continuous>  pantoprazole  Injectable 40 daily  Parenteral Nutrition - Adult 1 <Continuous>  Parenteral Nutrition - Adult 1 <Continuous>  petrolatum Ophthalmic Ointment 1 daily  Phoxillum Filtration BK 4 / 2.5 5000 <Continuous>  Phoxillum Filtration BK 4 / 2.5 5000 <Continuous>  propofol Infusion 20 <Continuous>  sodium chloride 0.9% lock flush 10 every 1 hour PRN  sodium chloride 3%  Inhalation 4 every 6 hours      T(C): , Max: 37 (06-04-23 @ 22:04)  T(F): , Max: 98.6 (06-04-23 @ 22:04)  HR: 76 (06-05-23 @ 13:00)  BP: --  BP(mean): --  RR: 18 (06-05-23 @ 13:00)  SpO2: 100% (06-05-23 @ 13:00)  Wt(kg): --    06-04 @ 07:01  -  06-05 @ 07:00  --------------------------------------------------------  IN: 3523.4 mL / OUT: 3524 mL / NET: -0.6 mL    06-05 @ 07:01  -  06-05 @ 14:01  --------------------------------------------------------  IN: 856.3 mL / OUT: 215 mL / NET: 641.3 mL      Height (cm): 182.9 (06-05 @ 01:25)  Weight (kg): 70 (06-05 @ 01:25)  BMI (kg/m2): 20.9 (06-05 @ 01:25)  BSA (m2): 1.91 (06-05 @ 01:25)    Review of Systems:  ROS negative except as per HPI      PHYSICAL EXAM:  GENERAL: intubated and sedated  CHEST/LUNG: mechanical breath sounds  HEART: normal S1S2, RRR  ABDOMEN: Soft, Nontender, +BS,   EXTREMITIES: +2 edema BL LE UE   ACCESS: L subclavian HD cath (placed 5/31)      LABS:                        8.2    11.55 )-----------( 75       ( 05 Jun 2023 13:41 )             25.4     06-05    137  |  103  |  27<H>  ----------------------------<  123<H>  4.1   |  26  |  0.69    Ca    7.6<L>      05 Jun 2023 05:30  Phos  3.4     06-05  Mg     2.0     06-05    TPro  5.1<L>  /  Alb  2.3<L>  /  TBili  1.4<H>  /  DBili  x   /  AST  21  /  ALT  11  /  AlkPhos  195<H>  06-05      PT/INR - ( 05 Jun 2023 05:30 )   PT: 15.7 sec;   INR: 1.32          PTT - ( 05 Jun 2023 05:30 )  PTT:33.4 sec          RADIOLOGY & ADDITIONAL STUDIES:           Patient is a 53y Male seen and evaluated at bedside. Remains intubated, sedated. On low dose of levo this AM. Getting pRBC. On CVVHD, no complications overnight      Meds:    acetylcysteine 20%  Inhalation 4 every 6 hours  albuterol/ipratropium for Nebulization 3 every 6 hours  artificial  tears Solution 1 two times a day  cefTRIAXone   IVPB 2000 every 24 hours  chlorhexidine 0.12% Liquid 15 every 12 hours  chlorhexidine 2% Cloths 1 daily  chlorhexidine 4% Liquid 1 <User Schedule>  CRRT Treatment  <Continuous>  CRRT Treatment  <Continuous>  dexMEDEtomidine Infusion 0.2 <Continuous>  dextrose 50% Injectable 25 once  dextrose Oral Gel 15 once PRN  fentaNYL   Infusion... 0.5 <Continuous>  glucagon  Injectable 1 once  lacosamide IVPB 250 every 12 hours  lipid, fat emulsion (Fish Oil and Plant Based) 20% Infusion 0.7 <Continuous>  norepinephrine Infusion 0.05 <Continuous>  pantoprazole  Injectable 40 daily  Parenteral Nutrition - Adult 1 <Continuous>  Parenteral Nutrition - Adult 1 <Continuous>  petrolatum Ophthalmic Ointment 1 daily  Phoxillum Filtration BK 4 / 2.5 5000 <Continuous>  Phoxillum Filtration BK 4 / 2.5 5000 <Continuous>  propofol Infusion 20 <Continuous>  sodium chloride 0.9% lock flush 10 every 1 hour PRN  sodium chloride 3%  Inhalation 4 every 6 hours      T(C): , Max: 37 (06-04-23 @ 22:04)  T(F): , Max: 98.6 (06-04-23 @ 22:04)  HR: 76 (06-05-23 @ 13:00)  BP: --  BP(mean): --  RR: 18 (06-05-23 @ 13:00)  SpO2: 100% (06-05-23 @ 13:00)  Wt(kg): --    06-04 @ 07:01  -  06-05 @ 07:00  --------------------------------------------------------  IN: 3523.4 mL / OUT: 3524 mL / NET: -0.6 mL    06-05 @ 07:01  -  06-05 @ 14:01  --------------------------------------------------------  IN: 856.3 mL / OUT: 215 mL / NET: 641.3 mL      Height (cm): 182.9 (06-05 @ 01:25)  Weight (kg): 70 (06-05 @ 01:25)  BMI (kg/m2): 20.9 (06-05 @ 01:25)  BSA (m2): 1.91 (06-05 @ 01:25)    Review of Systems:  ROS negative except as per HPI      PHYSICAL EXAM:  GENERAL: intubated and sedated  CHEST/LUNG: mechanical breath sounds  HEART: normal S1S2, RRR  ABDOMEN: Soft, Nontender, +BS,   EXTREMITIES: +2 edema BL LE UE   ACCESS: L subclavian HD cath (placed 5/31)      LABS:                        8.2    11.55 )-----------( 75       ( 05 Jun 2023 13:41 )             25.4     06-05    137  |  103  |  27<H>  ----------------------------<  123<H>  4.1   |  26  |  0.69    Ca    7.6<L>      05 Jun 2023 05:30  Phos  3.4     06-05  Mg     2.0     06-05    TPro  5.1<L>  /  Alb  2.3<L>  /  TBili  1.4<H>  /  DBili  x   /  AST  21  /  ALT  11  /  AlkPhos  195<H>  06-05      PT/INR - ( 05 Jun 2023 05:30 )   PT: 15.7 sec;   INR: 1.32          PTT - ( 05 Jun 2023 05:30 )  PTT:33.4 sec          RADIOLOGY & ADDITIONAL STUDIES:        Hemoglobin: 8.2 g/dL (06-05-23 @ 13:41)  Hemoglobin: 6.9 g/dL (06-05-23 @ 05:30)  Phosphorus Level, Serum: 3.4 mg/dL (06-05-23 @ 05:30)  Phosphorus Level, Serum: 2.1 mg/dL (06-04-23 @ 17:43)    Albumin, Serum: 2.3 g/dL (06-05-23 @ 05:30)  Albumin, Serum: 2.3 g/dL (06-04-23 @ 05:28)    T(C): 36.5 (06-05-23 @ 09:00), Max: 37 (06-04-23 @ 22:04)  HR: 76 (06-05-23 @ 13:00) (69 - 121)  BP: --  RR: 18 (06-05-23 @ 13:00) (9 - 61)  SpO2: 100% (06-05-23 @ 13:00) (99% - 100%)      CRRT Treatment:   Modality: CVVHD, Filter: NxStage CAR-505, Target Blood Flow: 300 mL/Min  Target Fluid Balance: Titrate to net NEGATIVE fluid balance, 20 mL/Hr (06-05-23 @ 14:01) [Active]  Phoxillum Filtration BK 4 / 2.5: Solution, 5000 milliLiter(s) infuse at 1500 mL/Hr; infuse through CRRT Circuit  Administration Instructions: Continuous Renal Replacement Therapy (CRRT)  Special Instructions: Dialysate (06-05-23 @ 14:01) [Active]

## 2023-06-05 NOTE — PROCEDURE NOTE - NSBRONCHHISTORY_GEN_A_CORE_FT
s/p TEVAR 5/5/23.  Ongoing respiratory failure and PNA.  Left lung whiteout on CXR this am.  O2 saturation stable.
Please see HPI  Patient tachypneic, respiratory failure, high peak airway pressures  Concerned for mucous plugging
53M h/o seizure d/o, aortic dissection s/p AVR, aortic graft revision 3/20/23, 2nd stage TEVAR 5/5/23, course c/b peripancreatic/RP hemorrhage/hematoma formation s/p multiple washout and recurrent respiratory failure with most recent reintubation on 6/3 due to excessive secretions and difficulty protecting airway. Pulmonary consulted for percutaneous tracheostomy.

## 2023-06-05 NOTE — PROGRESS NOTE ADULT - ATTENDING COMMENTS
CAD, seizures, aortic dissection, s/p second stage TAVR, AVR c/b pancreatitis with hemorrhage, Klebsiella septic shock, ATN  physical as above  trach performed and tolerated well  continue CVVH  shin changed to ceftriaxone  H/H stable  TPN with trickle jevity feeds  continue MV  decision making of high complexity, hope to wean sedation and pressors tomorrow

## 2023-06-05 NOTE — PROCEDURE NOTE - NSBRONCHPROCDETAILS_GEN_A_CORE_FT
Bronchoscope inserted through ETT. Airway evaluation revealed Sharp Cristal with scant secretions which were suctioned. ETT noted to be in good position. Bronchoscope then withdrawn from ETT. Minimal bleeding noted

## 2023-06-05 NOTE — PROGRESS NOTE ADULT - ATTENDING COMMENTS
CVVHD 2/2 oligoanuric iATN in the setting of septic shock 2/2 necrotizing pancreatitis with improving pressor support. Goal net negative 40ml/hr, goal over next 24hrs negative 0.5-1L

## 2023-06-05 NOTE — PROGRESS NOTE ADULT - SUBJECTIVE AND OBJECTIVE BOX
Patient seen bedside. Drain outputs are as follows:    1. 16 Fr teal LUQ peripancreatic abscess "Left Pancreatic CODIE Drain" placed on 5/24 (attached to gravity bag):   -265 cc serosanguinous output in 24 hrs. 80 cc in previous 24 hrs. Flushed by nursing.     2. 16 Fr teal LUQ hematoma "Left Abdominal Hematoma CODIE Drain" placed on 5/25 and upsized on 5/26:   -55 cc sanguinous output in 24 hrs. 15 cc in previous 24 hrs. Flushed easily with 3cc sterile NS.     H/H 6.9/20.8 today, down from 8.1/23.8 yd. HR 90s-100s past 24 hrs.

## 2023-06-05 NOTE — PROGRESS NOTE ADULT - SUBJECTIVE AND OBJECTIVE BOX
ON: BMP ok. KPhos 15. TFs off at MN. CPAP all night.  6/4:  and BP low--CVVHD run even, gave 250cc NS while waiting for Albumin 5% bolus, fluid responsive. Levo started for labile pressures. Switched back to CPAP 5/5. TPN reordered w KPhos. 250NS BID irrigation of L pancreatic drain started. TF advanced to 20cc. NPOatMN.      SUBJECTIVE: Patient seen and examined bedside; on CPAP comfortably, fent/precedex for pain/sedation, denied pain on exam, all drains stripped and draining, island dressing c/d/i dry blood noted over, tube feeds held at mn.    meropenem  IVPB 1000 milliGRAM(s) IV Intermittent every 8 hours  norepinephrine Infusion 0.05 MICROgram(s)/kG/Min IV Continuous <Continuous>      Vital Signs Last 24 Hrs  T(C): 36.6 (05 Jun 2023 04:54), Max: 37 (04 Jun 2023 09:15)  T(F): 97.9 (05 Jun 2023 04:54), Max: 98.6 (04 Jun 2023 09:15)  HR: 96 (05 Jun 2023 06:07) (90 - 121)  BP: 76/47 (04 Jun 2023 07:00) (76/47 - 76/47)  BP(mean): 57 (04 Jun 2023 07:00) (57 - 57)  RR: 12 (05 Jun 2023 06:00) (9 - 33)  SpO2: 99% (05 Jun 2023 06:07) (99% - 100%)    Parameters below as of 05 Jun 2023 05:00  Patient On (Oxygen Delivery Method): ventilator, CPAP    O2 Concentration (%): 40  I&O's Detail    03 Jun 2023 07:01  -  04 Jun 2023 07:00  --------------------------------------------------------  IN:    Albumin 5%  - 250 mL: 125 mL    Dexmedetomidine: 201 mL    Fat Emulsion (Fish Oil &amp; Plant Based) 20% Infusion: 240 mL    FentaNYL: 101.5 mL    IV PiggyBack: 150 mL    IV PiggyBack: 125 mL    Nepro with Carb Steady: 200 mL    Norepinephrine: 19.3 mL    Propofol: 16.8 mL    Sodium Chloride 0.9% Bolus: 250 mL    TPN (Total Parenteral Nutrition): 1680 mL  Total IN: 3108.6 mL    OUT:    Bulb (mL): 40 mL    Bulb (mL): 30 mL    Bulb (mL): 170 mL    Drain (mL): 15 mL    Drain (mL): 80 mL    Drain (mL): 15 mL    Fat Emulsion (Fish Oil &amp; Plant Based) 20% Infusion: 0 mL    Other (mL): 3796 mL    Other (mL): 200 mL    Voided (mL): 355 mL  Total OUT: 4701 mL    Total NET: -1592.4 mL      04 Jun 2023 07:01  -  05 Jun 2023 06:12  --------------------------------------------------------  IN:    Albumin 5%  - 250 mL: 125 mL    Dexmedetomidine: 393.6 mL    Fat Emulsion (Fish Oil &amp; Plant Based) 20% Infusion: 270.4 mL    FentaNYL: 161.1 mL    IV PiggyBack: 250 mL    IV PiggyBack: 150 mL    IV PiggyBack: 125 mL    Nepro with Carb Steady: 160 mL    Norepinephrine: 106.9 mL    TPN (Total Parenteral Nutrition): 1680 mL  Total IN: 3421.9 mL    OUT:    Bulb (mL): 275 mL    Bulb (mL): 20 mL    Bulb (mL): 45 mL    Drain (mL): 265 mL    Drain (mL): 55 mL    Fat Emulsion (Fish Oil &amp; Plant Based) 20% Infusion: 0 mL    Incontinent per Condom Catheter (mL): 225 mL    Other (mL): 2111 mL    Rectal Tube (mL): 100 mL  Total OUT: 3096 mL    Total NET: 325.9 mL      PE:    General: NAD, resting comfortably in bed  HEENT: Intubated  C/V: NSR, s1 s2, levophed 0.05  Pulm: Intubated, on CPAP, tolerating well  Abd: Soft, NTND, island dressing with minimal dry blood, all drains stripped and working, tube feeds held at mn  Extrem: [facility]        LABS:                        8.1    11.68 )-----------( 45       ( 04 Jun 2023 05:28 )             23.8     06-04    134<L>  |  101  |  30<H>  ----------------------------<  119<H>  3.9   |  27  |  0.72    Ca    7.8<L>      04 Jun 2023 17:43  Phos  2.1     06-04  Mg     1.9     06-04    TPro  5.3<L>  /  Alb  2.3<L>  /  TBili  1.8<H>  /  DBili  x   /  AST  20  /  ALT  9<L>  /  AlkPhos  236<H>  06-04    PT/INR - ( 04 Jun 2023 05:28 )   PT: 14.5 sec;   INR: 1.22          PTT - ( 04 Jun 2023 05:28 )  PTT:34.7 sec      RADIOLOGY & ADDITIONAL STUDIES:

## 2023-06-05 NOTE — PROCEDURE NOTE - NSICDXIRLAUNCH_GEN_ALL_CORE
<---Click to Launch ICDx for Pre-Op and Post-Op

## 2023-06-05 NOTE — PROGRESS NOTE ADULT - SUBJECTIVE AND OBJECTIVE BOX
INFECTIOUS DISEASES CONSULT FOLLOW-UP NOTE    INTERVAL HPI/OVERNIGHT EVENTS:    SABRINA, CPAP overnight       ROS:   Constitutional, eyes, ENT, cardiovascular, respiratory, gastrointestinal, genitourinary, integumentary, neurological, psychiatric and heme/lymph are otherwise negative other than noted above       ANTIBIOTICS/RELEVANT:    MEDICATIONS  (STANDING):  acetylcysteine 20%  Inhalation 4 milliLiter(s) Inhalation every 6 hours  albuterol/ipratropium for Nebulization 3 milliLiter(s) Nebulizer every 6 hours  artificial  tears Solution 1 Drop(s) Both EYES two times a day  chlorhexidine 0.12% Liquid 15 milliLiter(s) Oral Mucosa every 12 hours  chlorhexidine 2% Cloths 1 Application(s) Topical daily  chlorhexidine 4% Liquid 1 Application(s) Topical <User Schedule>  CRRT Treatment    <Continuous>  dexMEDEtomidine Infusion 0.2 MICROgram(s)/kG/Hr (3.5 mL/Hr) IV Continuous <Continuous>  dextrose 50% Injectable 25 Gram(s) IV Push once  fentaNYL   Infusion... 0.5 MICROgram(s)/kG/Hr (1.75 mL/Hr) IV Continuous <Continuous>  glucagon  Injectable 1 milliGRAM(s) IntraMuscular once  lacosamide IVPB 250 milliGRAM(s) IV Intermittent every 12 hours  lipid, fat emulsion (Fish Oil and Plant Based) 20% Infusion 0.7 Gm/kG/Day (20.83 mL/Hr) IV Continuous <Continuous>  meropenem  IVPB 1000 milliGRAM(s) IV Intermittent every 8 hours  norepinephrine Infusion 0.05 MICROgram(s)/kG/Min (6.56 mL/Hr) IV Continuous <Continuous>  pantoprazole  Injectable 40 milliGRAM(s) IV Push daily  Parenteral Nutrition - Adult 1 Each (70 mL/Hr) TPN Continuous <Continuous>  Parenteral Nutrition - Adult 1 Each (70 mL/Hr) TPN Continuous <Continuous>  petrolatum Ophthalmic Ointment 1 Application(s) Both EYES daily  Phoxillum Filtration BK 4 / 2.5 5000 milliLiter(s) (1500 mL/Hr) CRRT <Continuous>  propofol Infusion 20 MICROgram(s)/kG/Min (8.4 mL/Hr) IV Continuous <Continuous>  sodium chloride 3%  Inhalation 4 milliLiter(s) Inhalation every 6 hours    MEDICATIONS  (PRN):  dextrose Oral Gel 15 Gram(s) Oral once PRN Blood Glucose LESS THAN 70 milliGRAM(s)/deciliter  sodium chloride 0.9% lock flush 10 milliLiter(s) IV Push every 1 hour PRN Pre/post blood products, medications, blood draw, and to maintain line patency        Vital Signs Last 24 Hrs  T(C): 36.5 (05 Jun 2023 09:00), Max: 37 (04 Jun 2023 22:04)  T(F): 97.7 (05 Jun 2023 09:00), Max: 98.6 (04 Jun 2023 22:04)  HR: 77 (05 Jun 2023 10:00) (77 - 121)  BP: --  BP(mean): --  RR: 61 (05 Jun 2023 10:00) (9 - 61)  SpO2: 100% (05 Jun 2023 10:00) (99% - 100%)    Parameters below as of 05 Jun 2023 10:00  Patient On (Oxygen Delivery Method): ventilator        06-04-23 @ 07:01  -  06-05-23 @ 07:00  --------------------------------------------------------  IN: 3523.4 mL / OUT: 3524 mL / NET: -0.6 mL    06-05-23 @ 07:01  -  06-05-23 @ 11:06  --------------------------------------------------------  IN: 205.5 mL / OUT: 215 mL / NET: -9.5 mL      PHYSICAL EXAM:  Constitutional: intubated and sedated   ENT: the ears and nose were normal in appearance.  ET tube   Neck: the appearance of the neck was normal and the neck was supple.   Pulmonary: no respiratory distress and lungs were clear to auscultation bilaterally.   Heart: heart rate was normal and rhythm regular, normal S1 and S2  Abdomen: soft, multiple drains        LABS:                        6.9    10.63 )-----------( 38       ( 05 Jun 2023 05:30 )             20.8     06-05    137  |  103  |  27<H>  ----------------------------<  123<H>  4.1   |  26  |  0.69    Ca    7.6<L>      05 Jun 2023 05:30  Phos  3.4     06-05  Mg     2.0     06-05    TPro  5.1<L>  /  Alb  2.3<L>  /  TBili  1.4<H>  /  DBili  x   /  AST  21  /  ALT  11  /  AlkPhos  195<H>  06-05    PT/INR - ( 05 Jun 2023 05:30 )   PT: 15.7 sec;   INR: 1.32          PTT - ( 05 Jun 2023 05:30 )  PTT:33.4 sec      MICROBIOLOGY:      RADIOLOGY & ADDITIONAL STUDIES:  Reviewed

## 2023-06-05 NOTE — PROGRESS NOTE ADULT - ASSESSMENT
53M h/o seizure d/o, aortic dissection s/p AVR, aortic graft revision 3/20/23, 2nd stage TEVAR 5/5/23, course c/b peripancreatic/RP hemorrhage/hematoma formation s/p multiple washout .  All cultures grew K.pneumo.  Patient unable to tolerate extubation due to secretion.  Continued drop in Hb, though stable from infectious standpoint given dropping wbc and fever curve.     - cont meropenem 1g IV q8h  - f/u repeat cultures    Team 1 will continue to follow.      53M h/o seizure d/o, aortic dissection s/p AVR, aortic graft revision 3/20/23, 2nd stage TEVAR 5/5/23, course c/b peripancreatic/RP hemorrhage/hematoma formation s/p multiple washout .  All cultures grew K.pneumo.  Patient unable to tolerate extubation due to secretion.  Continued drop in Hb, though stable from infectious standpoint given dropping wbc and fever curve.     - cont meropenem 1g IV q8h, OK to transition to CTX 2G q24hrs   - f/u repeat cultures    Team 1 will continue to follow.

## 2023-06-05 NOTE — PROCEDURE NOTE - NSUS ED ADDITIONAL DETAIL1 FT
Small loculated left pleural effusion
Assessment of trachea and tracheal rings prior to planned tracheostomy. No arterial vessels visualized. Thyroid visualized. Tracheal rings visualized.

## 2023-06-05 NOTE — PROCEDURE NOTE - NSBRONCHFINDINGS_GEN_A_CORE_FT
Scant secretions as above. ETT withdrawn under guidance of bronchoscope and percutaneous tracheostomy placed with visual guidance - refer to procedure note for seperate details. Location of tracheostomy confirmed with bronchoscope.

## 2023-06-05 NOTE — PROGRESS NOTE ADULT - ASSESSMENT
53-year-old male with PMHx of HTN, Type A aortic dissection s/p Dacron grafts, AV resuspension in 2013, CAD s/p CABG x 1 SVG to RCA (05/2013, Dr. Medina), seizure disorder (last episode on 07/04/2022) S/P replacement of transverse aortic arch, second stage thoracic endovascular aortic repair, aorto-axillary bypass, AV replacement (bio 23mm), in APr 2023 and CABG x 1 (SVG-RCA) EF 75% (Ignacio, 03/2020) now s/p 2nd state TEVAR (05/05/2023) prompting General Surgery consult for acute pancreatitis with large peripancreatic/perigastric fluid collections on CTA abdomen 05/08/2023 followed by acute hemorrhage starting 05/12/2023 requiring 11 U pRBC, but with negative mesenteric angiogram 05/13/2023 prompting Hepatobiliary Surgery reconsult for possible hemorrhagic pancreatitis, 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  Patient is s/p OR washout (05/31/2023), drains (RP, LUQ, pelvis) placement, and abthera wound vac.      PLAN    - Wean off pressors  - Tracheostomy today with pulm  - Continue TPN   - Cont feeds through J tube, goal rate of 20ml/hr after tracheostomy  - Transfusion for Hgb < 8 as needed, platelets if < 50k prior to procedure today  - Continue to irrigate through left pancreatic drain, 250NS over 3 hours BID; after irrigation finished leave drain to gravity  - No hyperosmolar feeding   - Rest of care per SICU  - Surgery Team 4C will continue to follow. Please page Team 4 with questions/clinical changes. [phone]

## 2023-06-05 NOTE — PROGRESS NOTE ADULT - ASSESSMENT
53 year-old male with PMHx HTN, type A aortic dissection s/p Dacron grafts, AV resuspension in 2013, CAD s/p CABG x 1 SVG to RCA (5/2013 with Dr. Medina), seizure disorder, recent admission 3/20-4/14 for replacement of transverse aortic arch, second stage TEVAR 5/5 with course c/b peripancreatic/RP hemorrhage/hematoma formation s/p multiple washouts on antibiotics. Epilepsy team initially consulted for suspected seizure event on 5/14/23 that did not correlate with findings on EEG recordings. He has been switched from depakote to vimpat while inpatient. Original concerns were for possible drug-related hemorrhagic pancreatitis, however, seems less likely due to gallstone involvement. CTH from 5/18 without acute pathology.     Recommendations:  - while on vimpat 250mg BID would place back on vEEG to detect for possible epileptiform activity  - agree with holding depakote, especially with thrombocytopenia  - Ativan 2mg IVP for seizures > 2mins  - Keep Mg>2 and K >4.   - Rest of management per primary team    Case discussed with Dr. Flores. Note not final until attending attestation.  53 year-old male with PMHx HTN, type A aortic dissection s/p Dacron grafts, AV resuspension in 2013, CAD s/p CABG x 1 SVG to RCA (5/2013 with Dr. Medina), seizure disorder, recent admission 3/20-4/14 for replacement of transverse aortic arch, second stage TEVAR 5/5 with course c/b peripancreatic/RP hemorrhage/hematoma formation s/p multiple washouts on antibiotics. Epilepsy team initially consulted for history and epilepsy and suspected seizure on 5/12 thought to be provoked. Additional event on 5/14/23 that did not correlate with findings on EEG recordings. Vimpat was increased and he was tapered off Depakote previously due to concerns for possible drug-related hemorrhagic pancreatitis, however, seems less likely and currently pancreatitis is thought to be secondary to gallstones. CTH from 5/18 without acute pathology.     Recommendations:  - Continue Vimpat 250mg BID   - would place back on vEEG to detect for possible epileptiform activity  - Could continue to hold depakote, especially given thrombocytopenia  - Ativan 2mg IVP for seizures > 2mins  - Keep Mg>2 and K >4.   - Rest of management per primary team

## 2023-06-05 NOTE — CONSULT NOTE ADULT - ASSESSMENT
53M h/o seizure d/o, aortic dissection s/p AVR, aortic graft revision 3/20/23, 2nd stage TEVAR 5/5/23, course c/b peripancreatic/RP hemorrhage/hematoma formation s/p multiple washout and recurrent respiratory failure with most recent reintubation on 6/3 due to excessive secretions and difficulty protecting airway. Pulmonary consulted for percutaneous tracheostomy.     #Recurrent Respiratory Failure   #Critical Illness Neuropathy/Myopathy    Given multiple extubation failures and concern for critical illness myopathy/neuropathy, percutaneous trachoestomy is reasonable. Will plan to perform at the bedside today. Labs and chart reviewed - thrombocytopenic and anemic, likely due to underproduction and ongoing sequestration from sepsis.     Recommend:   - please keep sedated to RAAS -4 in anticipation of need for paralytics for procedure this afternoon   - repeat CBC after PRBC/Plt transfusion; please have additional unit of platelets ready to transfuse prior to procedure at 4 PM   - refer to US note for tracheal anatomy

## 2023-06-05 NOTE — PROGRESS NOTE ADULT - ASSESSMENT
53yMale w/ PMHx of HTN, type A aortic dissection s/p Dacron grafts, AV resuspension in 2013, CAD s/p CABG x 1 SVG to RCA (5/2013 with Dr. Medina), seizure disorder, recent admission 3/20-4/14 for replacement of transverse aortic arch, second stage TEVAR and aorto-axillary bypass, AV replacement (bio 23mm), and CABG x 1 (SVG-RCA). Pt found to have endo leak and taken to OR for TEVAR revision on 5/5, Post op course c/b Klebsiella bacteremia (5/15), resp failure requiring intubation on 5/18, Mucus plugging s/p Bronch 5/19 and PEA arrest. Post operatively pt also found to have hemorrhagic pancreatitis with venu-pancreatic abscess possibly 2* to Depakote therefore s/p IR aspiration of peripancreatic fluid collection and paracentesis on 5/22, IR venu-pancreatic abscess drainage and placement of drainage catheter on 5/24. Pt then transferred from CTICU to SICU for further mgmt of hemorrhagic pancreatitis on 5/25. s/p IR placement of 2 new drainage catheters and catheter exchange on 5/26. LUQ drainage 5/30. OR 5/31 exlap washout & replacement of 3 new JPs and abthera vac with open abdomen. RTOR 6/1, no bleeding, evac of old blood, placement of feeding J-tube.     NEURO: Sedation: Fentanyl gtts, Propofol gtt. Hx seizures: epilepsy recs appreciated, Cont vimpat. Depakote weaned off due to concerns for drug-related hemorrhagic pancreatitis.   HEENT: Artificial tears  CV: s/p PEA arrest on 5/24 night. Echo from 5/19 hyperdynamic LV, SHAJI with LVOTO (gradient 26), normal RV, mild MR, no pulm HTN. ASA 81mg held 2/2 to acute blood loss anemia.    PULM: failed extubation 6/3: back on AC 40/500/22/8 with daily CPAP trial, plan for bedside trach w/ pulm 6/5. ARDS resolved - off NO, s/p multiple Mucus plug/ Lung collapse s/p bronch x3 (most recently on 5/26) most likely from outward obstruction: Cont rotating pt around the clock. Cont Duonebs, Mucomyst, 3% saline.  GI/FEN: TPN, Holding Trickle TF Jevity @20 via feeding J-tube for procedure, resume post procedure, Protonix BID, Hemorrhagic pancreatitis: s/p multiple drain placements and paracentisis by IR team.  FOBT+ 5/29. Constipation: resolved, now diarrhea: d/c Rectal tube 6/4  : TREY on CVVHD - continue with fluid removal today s/p product administration. Nephro following. Cantrell d/c'ed 5/26 - Continue bladder scan daily for poss straight cath.   HEME: s/p 1U PRBC, 1 PLT- Additional PLT for tracheostomy (6/5). Acute blood loss anemia requiring multiple transfusions  ENDO: mISS  ID: Start Ceftriaxone 2g qd, ID following. Kleb bacteremia from 5/15 & 5/17, subsequent bld cx negative, /// DC: Chloe (5/21-6/5),  Caspo (5/23-5/30), Ampicillin ( 5/21- 5/27). PNA w/ bronch cx kleb, enterobacter (zosyn, erta resistant), E faecalis, strep angionosus from 5/19, pancreatic abscess cx pan-sensitive kleb 5/22.   PPX: SCDs, SQH on hold.   LINES: L rad kalpana (5/31--), Lsubclav HD Cath (5/31--), RIJ TLC (6/1--) LIJ TLC (5/23--) // DC: RIJ TLC (5/22-5/27), RIJ HD cath (5/22-31), R Ax kalpana(5/12-5/31)  WOUNDS/DRAINS: right OR CODIE, 2 left OR CODIE, left IR hematoma drain, left IR pancreatic drain (bilious) to BID drainage w/ 250 NS.   PT: PT/OR ordered on 5/28, re-order when off sedation.   Dispo: SICU

## 2023-06-05 NOTE — PROGRESS NOTE ADULT - SUBJECTIVE AND OBJECTIVE BOX
SUBJECTIVE: Patient seen and evaluated. Arousable but non responsive while intubated and sedated        MEDICATIONS  (STANDING):  acetylcysteine 20%  Inhalation 4 milliLiter(s) Inhalation every 6 hours  albuterol/ipratropium for Nebulization 3 milliLiter(s) Nebulizer every 6 hours  artificial  tears Solution 1 Drop(s) Both EYES two times a day  cefTRIAXone   IVPB 2000 milliGRAM(s) IV Intermittent every 24 hours  chlorhexidine 0.12% Liquid 15 milliLiter(s) Oral Mucosa every 12 hours  chlorhexidine 2% Cloths 1 Application(s) Topical daily  chlorhexidine 4% Liquid 1 Application(s) Topical <User Schedule>  CRRT Treatment    <Continuous>  dexMEDEtomidine Infusion 0.2 MICROgram(s)/kG/Hr (3.5 mL/Hr) IV Continuous <Continuous>  dextrose 50% Injectable 25 Gram(s) IV Push once  fentaNYL   Infusion... 0.5 MICROgram(s)/kG/Hr (1.75 mL/Hr) IV Continuous <Continuous>  glucagon  Injectable 1 milliGRAM(s) IntraMuscular once  lacosamide IVPB 250 milliGRAM(s) IV Intermittent every 12 hours  lipid, fat emulsion (Fish Oil and Plant Based) 20% Infusion 0.7 Gm/kG/Day (20.83 mL/Hr) IV Continuous <Continuous>  norepinephrine Infusion 0.05 MICROgram(s)/kG/Min (6.56 mL/Hr) IV Continuous <Continuous>  pantoprazole  Injectable 40 milliGRAM(s) IV Push daily  Parenteral Nutrition - Adult 1 Each (70 mL/Hr) TPN Continuous <Continuous>  Parenteral Nutrition - Adult 1 Each (70 mL/Hr) TPN Continuous <Continuous>  petrolatum Ophthalmic Ointment 1 Application(s) Both EYES daily  Phoxillum Filtration BK 4 / 2.5 5000 milliLiter(s) (1500 mL/Hr) CRRT <Continuous>  propofol Infusion 20 MICROgram(s)/kG/Min (8.4 mL/Hr) IV Continuous <Continuous>  sodium chloride 3%  Inhalation 4 milliLiter(s) Inhalation every 6 hours    MEDICATIONS  (PRN):  dextrose Oral Gel 15 Gram(s) Oral once PRN Blood Glucose LESS THAN 70 milliGRAM(s)/deciliter  sodium chloride 0.9% lock flush 10 milliLiter(s) IV Push every 1 hour PRN Pre/post blood products, medications, blood draw, and to maintain line patency      Vital Signs Last 24 Hrs  T(C): 36.5 (05 Jun 2023 09:00), Max: 37 (04 Jun 2023 22:04)  T(F): 97.7 (05 Jun 2023 09:00), Max: 98.6 (04 Jun 2023 22:04)  HR: 77 (05 Jun 2023 12:00) (69 - 121)  BP: --  BP(mean): --  RR: 46 (05 Jun 2023 12:00) (9 - 61)  SpO2: 100% (05 Jun 2023 12:00) (99% - 100%)    Parameters below as of 05 Jun 2023 11:00  Patient On (Oxygen Delivery Method): ventilator        Physical Exam:  General: NAD, resting comfortably in bed  Neuro: Sedated, RASS -4  Pulmonary: Nonlabored, decrease breath sounds in b/l bases, intubated AC/VC  Mode: AC/ CMV (Assist Control/ Continuous Mandatory Ventilation)  RR (machine): 18  TV (machine): 500  FiO2: 40  PEEP: 5  ITime: 1  MAP: 8  PIP: 15  Cardiovascular: sinus tachycardia, S1 S2  Abdominal: soft, mod distention, unable to assess tenderness, midline island dressing with minimal strike, CODIE drains all SS, Pancreatic drain to gravity, J tube to gravity with bilious o/p  Extremities: edematous  : condom catheter in place    I&O's Summary    04 Jun 2023 07:01  -  05 Jun 2023 07:00  --------------------------------------------------------  IN: 3523.4 mL / OUT: 3524 mL / NET: -0.6 mL    05 Jun 2023 07:01  -  05 Jun 2023 12:58  --------------------------------------------------------  IN: 856.3 mL / OUT: 215 mL / NET: 641.3 mL        LABS:                        6.9    10.63 )-----------( 38       ( 05 Jun 2023 05:30 )             20.8     06-05    137  |  103  |  27<H>  ----------------------------<  123<H>  4.1   |  26  |  0.69    Ca    7.6<L>      05 Jun 2023 05:30  Phos  3.4     06-05  Mg     2.0     06-05    TPro  5.1<L>  /  Alb  2.3<L>  /  TBili  1.4<H>  /  DBili  x   /  AST  21  /  ALT  11  /  AlkPhos  195<H>  06-05    PT/INR - ( 05 Jun 2023 05:30 )   PT: 15.7 sec;   INR: 1.32          PTT - ( 05 Jun 2023 05:30 )  PTT:33.4 sec    CAPILLARY BLOOD GLUCOSE        LIVER FUNCTIONS - ( 05 Jun 2023 05:30 )  Alb: 2.3 g/dL / Pro: 5.1 g/dL / ALK PHOS: 195 U/L / ALT: 11 U/L / AST: 21 U/L / GGT: x             RADIOLOGY & ADDITIONAL STUDIES:

## 2023-06-05 NOTE — PROGRESS NOTE ADULT - ASSESSMENT
52 Y Male w/ HTN, aortic dissection previous admission with severe cardiogenic shock and oliguric TREY requiring CVVHD. Presented to the ED 5/5 with worsening epigastric pain CTA/P revealed continued endoleak hospital course complicated by necrotic/hemorrhagic pancreatitis. Nephrology following for oliguric renal failure, CVVHD dependent    #oliguric ATN 2/2 cardiogenic shock    recommend:  patient remains oliguric with significant volume overload  will continue with CVVHD on Phoxillum  qb 300ml/min DFR 1.5L/hour. Pt net even/24 hours, net neg 1.5L/48 hours. Getting pRBC and platelets today, so will order UF net neg 20ml/hr and if tolerated by BP, will increase to 40ml/hr to target 0.5-1L in the next 24 hours  q8-12H BMP while on CVVHD  Check phos daily  maintain MAP > 70 for adequate renal perfusion  strict i's and o's  renally dose abx egfr <15  Bladder scan BID to assess for signs of renal recovery  52 Y Male w/ HTN, aortic dissection previous admission with severe cardiogenic shock and oliguric TREY requiring CVVHD. Presented to the ED 5/5 with worsening epigastric pain CTA/P revealed continued endoleak hospital course complicated by necrotic/hemorrhagic pancreatitis. Nephrology following for oliguric renal failure, CVVHD dependent    #oliguric ATN 2/2 cardiogenic shock    recommend:  patient remains oliguric with significant volume overload  will continue with CVVHD on Phoxillum  qb 300ml/min DFR 1.5L/hour. Pt net even/24 hours, net neg 1.5L/48 hours. Getting pRBC and platelets today, so will order UF net neg 20ml/hr and if tolerated by BP, will increase to 40ml/hr to target 0.5-1L in the next 24 hours  q8-12H BMP while on CVVHD  Check phos daily  maintain MAP > 70 for adequate renal perfusion  strict i's and o's  renally dose abx egfr <15  Bladder scan BID to assess for signs of renal recovery     #Seen on CVVHD  Tolerating well. Continue as above.

## 2023-06-05 NOTE — CONSULT NOTE ADULT - SUBJECTIVE AND OBJECTIVE BOX
PULMONARY SERVICE INITIAL CONSULT NOTE    HPI:  51 YO Male, current every day marijuana use, w/ PMHx of HTN, type A aortic dissection s/p Dacron grafts, AV resuspension in 2013, CAD s/p CABG x 1 SVG to RCA (5/2013 with Dr. Medina), seizure disorder (last episode on 7/4/22) who was admitted for surgical mgmt of progression of aneurysmal disease. Recent admission 3/20-4/14 during which patient underwent replacement of transverse aortic arch, second stage thoracic endovascular aortic repair, aorto-axillary bypass, AV replacement (bio 23mm), and CABG x 1 (SVG-RCA) EF 75% (Ignacio, 3/20). Post op cb lactic acidosis, severe cardiogenic and vasogenic shock, TREY requiring CVVHD and temporary dialysis requirement. Patient presented to [facility] ED 4/27 for syncope vs seizure episode at home, falling onto back of head. Nuerological workup proformed during that visit revealed a negative EEG, but given clinical picture of seizures, pt started on vimpat and depakote. Imaging preformed during that admission did no require acute surgical intervention on endoleak.   Pt presented to [facility] ED last night complaning of worsening epigastric pain. CTAP revealed continued endoleak. Sent to [facility] for further evaluation. Pt will be taken to the OR with Dr. Pierre this morning for a completion TEVAR. Pt consented, OR aware.         (05 May 2023 09:41)    Additional Pulmonary History: Course complicated by recurrent respiratory failure, most recently intubated 6/3 given large secretion burden and difficulty protecting airway. Remainder of hospital course and labs reviewed - now on CVVHD due to oliguric renal failure and remains in shock due to intraabdominal sepsis - ID following for management. Given recurrent respiratory failure, pulmonary consulted for percutaneous tracheostomy placement. Unable to obtain ROS secondary to mental status.       REVIEW OF SYSTEMS:  Unable to obtain     PAST MEDICAL & SURGICAL HISTORY:  HTN (hypertension)      Aortic dissection      CAD (coronary artery disease)      Seizure disorder      Seizure disorder      Hypertension      Status post endovascular aneurysm repair (EVAR)      S/P aortic bifurcation bypass graft      S/P CABG x 1          FAMILY HISTORY:  No pertinent family history in first degree relatives        SOCIAL HISTORY:  Smoking Status: unable to obtain     MEDICATIONS:  Pulmonary:  acetylcysteine 20%  Inhalation 4 milliLiter(s) Inhalation every 6 hours  albuterol/ipratropium for Nebulization 3 milliLiter(s) Nebulizer every 6 hours  sodium chloride 3%  Inhalation 4 milliLiter(s) Inhalation every 6 hours    Antimicrobials:  meropenem  IVPB 1000 milliGRAM(s) IV Intermittent every 8 hours    Anticoagulants:    Onc:    GI/:  pantoprazole  Injectable 40 milliGRAM(s) IV Push daily    Endocrine:  dextrose 50% Injectable 25 Gram(s) IV Push once  dextrose Oral Gel 15 Gram(s) Oral once PRN  glucagon  Injectable 1 milliGRAM(s) IntraMuscular once    Cardiac:  norepinephrine Infusion 0.05 MICROgram(s)/kG/Min IV Continuous <Continuous>    Other Medications:  artificial  tears Solution 1 Drop(s) Both EYES two times a day  chlorhexidine 0.12% Liquid 15 milliLiter(s) Oral Mucosa every 12 hours  chlorhexidine 2% Cloths 1 Application(s) Topical daily  chlorhexidine 4% Liquid 1 Application(s) Topical <User Schedule>  CRRT Treatment    <Continuous>  dexMEDEtomidine Infusion 0.2 MICROgram(s)/kG/Hr IV Continuous <Continuous>  fentaNYL   Infusion... 0.5 MICROgram(s)/kG/Hr IV Continuous <Continuous>  lacosamide IVPB 250 milliGRAM(s) IV Intermittent every 12 hours  lipid, fat emulsion (Fish Oil and Plant Based) 20% Infusion 0.7 Gm/kG/Day IV Continuous <Continuous>  lipid, fat emulsion (Fish Oil and Plant Based) 20% Infusion 0.7 Gm/kG/Day IV Continuous <Continuous>  Parenteral Nutrition - Adult 1 Each TPN Continuous <Continuous>  Parenteral Nutrition - Adult 1 Each TPN Continuous <Continuous>  petrolatum Ophthalmic Ointment 1 Application(s) Both EYES daily  Phoxillum Filtration BK 4 / 2.5 5000 milliLiter(s) CRRT <Continuous>  propofol Infusion 20 MICROgram(s)/kG/Min IV Continuous <Continuous>  sodium chloride 0.9% lock flush 10 milliLiter(s) IV Push every 1 hour PRN      Allergies    No Known Allergies    Intolerances        Vital Signs Last 24 Hrs  T(C): 36.5 (05 Jun 2023 09:00), Max: 37 (04 Jun 2023 22:04)  T(F): 97.7 (05 Jun 2023 09:00), Max: 98.6 (04 Jun 2023 22:04)  HR: 77 (05 Jun 2023 09:42) (77 - 121)  BP: --  BP(mean): --  RR: 18 (05 Jun 2023 09:35) (9 - 33)  SpO2: 100% (05 Jun 2023 09:42) (99% - 100%)    Parameters below as of 05 Jun 2023 09:42  Patient On (Oxygen Delivery Method): ventilator        06-04 @ 07:01  -  06-05 @ 07:00  --------------------------------------------------------  IN: 3523.4 mL / OUT: 3524 mL / NET: -0.6 mL    06-05 @ 07:01  -  06-05 @ 10:42  --------------------------------------------------------  IN: 111.5 mL / OUT: 115 mL / NET: -3.5 mL      Mode: AC/ CMV (Assist Control/ Continuous Mandatory Ventilation)  RR (machine): 18  TV (machine): 500  FiO2: 40  PEEP: 5  ITime: 1  MAP: 8  PIP: 15      PHYSICAL EXAM:  Constitutional: intubated and sedated, ill appearing   Head: NC/AT  EENT: PERRL, anicteric sclera; oropharynx clear, MMM  Neck: supple, no appreciable JVD  Respiratory: CTA B/L; no W/R/R  Cardiovascular: +S1/S2, RRR  Gastrointestinal: soft, NT/ND; multiple drains with serosanguinous and bilious output; midline staples   Extremities: WWP; dependent edema   Vascular: 2+ radial pulses B/L  Neurological: intubated and sedated and paralyzed    LABS:  ABG - ( 05 Jun 2023 06:30 )  pH, Arterial: 7.42  pH, Blood: x     /  pCO2: 41    /  pO2: 122   / HCO3: 27    / Base Excess: 1.9   /  SaO2: 98.9                CBC Full  -  ( 05 Jun 2023 05:30 )  WBC Count : 10.63 K/uL  RBC Count : 2.30 M/uL  Hemoglobin : 6.9 g/dL  Hematocrit : 20.8 %  Platelet Count - Automated : 38 K/uL  Mean Cell Volume : 90.4 fl  Mean Cell Hemoglobin : 30.0 pg  Mean Cell Hemoglobin Concentration : 33.2 gm/dL  Auto Neutrophil # : 7.64 K/uL  Auto Lymphocyte # : 0.84 K/uL  Auto Monocyte # : 0.47 K/uL  Auto Eosinophil # : 1.68 K/uL  Auto Basophil # : 0.00 K/uL  Auto Neutrophil % : 71.9 %  Auto Lymphocyte % : 7.9 %  Auto Monocyte % : 4.4 %  Auto Eosinophil % : 15.8 %  Auto Basophil % : 0.0 %    06-05    137  |  103  |  27<H>  ----------------------------<  123<H>  4.1   |  26  |  0.69    Ca    7.6<L>      05 Jun 2023 05:30  Phos  3.4     06-05  Mg     2.0     06-05    TPro  5.1<L>  /  Alb  2.3<L>  /  TBili  1.4<H>  /  DBili  x   /  AST  21  /  ALT  11  /  AlkPhos  195<H>  06-05    PT/INR - ( 05 Jun 2023 05:30 )   PT: 15.7 sec;   INR: 1.32          PTT - ( 05 Jun 2023 05:30 )  PTT:33.4 sec                  RADIOLOGY & ADDITIONAL STUDIES:

## 2023-06-05 NOTE — PROCEDURE NOTE - NSICDXIRPREOP_GEN_A_CORE_FT
PRE-OP DIAGNOSIS:  Ascites 22-May-2023 13:49:35  Enoc Wagner  Peripancreatic fluid collection 22-May-2023 13:49:47  Enoc Wagner  
PRE-OP DIAGNOSIS:  Respiratory failure 26-May-2023 16:21:49  Isaías Blanca  
PRE-OP DIAGNOSIS:  Respiratory failure 26-May-2023 16:21:49  Isaías Blanca

## 2023-06-06 LAB
ALBUMIN SERPL ELPH-MCNC: 2.1 G/DL — LOW (ref 3.3–5)
ALP SERPL-CCNC: 215 U/L — HIGH (ref 40–120)
ALT FLD-CCNC: 10 U/L — SIGNIFICANT CHANGE UP (ref 10–45)
ANION GAP SERPL CALC-SCNC: 6 MMOL/L — SIGNIFICANT CHANGE UP (ref 5–17)
ANION GAP SERPL CALC-SCNC: 7 MMOL/L — SIGNIFICANT CHANGE UP (ref 5–17)
ANISOCYTOSIS BLD QL: SLIGHT — SIGNIFICANT CHANGE UP
APTT BLD: 34.1 SEC — SIGNIFICANT CHANGE UP (ref 27.5–35.5)
AST SERPL-CCNC: 18 U/L — SIGNIFICANT CHANGE UP (ref 10–40)
BASE EXCESS BLDA CALC-SCNC: 0.7 MMOL/L — SIGNIFICANT CHANGE UP (ref -2–3)
BASOPHILS # BLD AUTO: 0.08 K/UL — SIGNIFICANT CHANGE UP (ref 0–0.2)
BASOPHILS NFR BLD AUTO: 0.9 % — SIGNIFICANT CHANGE UP (ref 0–2)
BILIRUB SERPL-MCNC: 1.5 MG/DL — HIGH (ref 0.2–1.2)
BUN SERPL-MCNC: 27 MG/DL — HIGH (ref 7–23)
BUN SERPL-MCNC: 28 MG/DL — HIGH (ref 7–23)
CALCIUM SERPL-MCNC: 7.8 MG/DL — LOW (ref 8.4–10.5)
CALCIUM SERPL-MCNC: 7.8 MG/DL — LOW (ref 8.4–10.5)
CHLORIDE SERPL-SCNC: 101 MMOL/L — SIGNIFICANT CHANGE UP (ref 96–108)
CHLORIDE SERPL-SCNC: 102 MMOL/L — SIGNIFICANT CHANGE UP (ref 96–108)
CO2 BLDA-SCNC: 27 MMOL/L — HIGH (ref 19–24)
CO2 SERPL-SCNC: 24 MMOL/L — SIGNIFICANT CHANGE UP (ref 22–31)
CO2 SERPL-SCNC: 25 MMOL/L — SIGNIFICANT CHANGE UP (ref 22–31)
CREAT SERPL-MCNC: 0.63 MG/DL — SIGNIFICANT CHANGE UP (ref 0.5–1.3)
CREAT SERPL-MCNC: 0.65 MG/DL — SIGNIFICANT CHANGE UP (ref 0.5–1.3)
EGFR: 112 ML/MIN/1.73M2 — SIGNIFICANT CHANGE UP
EGFR: 113 ML/MIN/1.73M2 — SIGNIFICANT CHANGE UP
ELLIPTOCYTES BLD QL SMEAR: SLIGHT — SIGNIFICANT CHANGE UP
EOSINOPHIL # BLD AUTO: 1.65 K/UL — HIGH (ref 0–0.5)
EOSINOPHIL NFR BLD AUTO: 18.9 % — HIGH (ref 0–6)
FIBRINOGEN PPP-MCNC: 355 MG/DL — SIGNIFICANT CHANGE UP (ref 200–445)
GIANT PLATELETS BLD QL SMEAR: PRESENT — SIGNIFICANT CHANGE UP
GLUCOSE BLDC GLUCOMTR-MCNC: 100 MG/DL — HIGH (ref 70–99)
GLUCOSE BLDC GLUCOMTR-MCNC: 119 MG/DL — HIGH (ref 70–99)
GLUCOSE SERPL-MCNC: 119 MG/DL — HIGH (ref 70–99)
GLUCOSE SERPL-MCNC: 120 MG/DL — HIGH (ref 70–99)
HCO3 BLDA-SCNC: 25 MMOL/L — SIGNIFICANT CHANGE UP (ref 21–28)
HCT VFR BLD CALC: 23 % — LOW (ref 39–50)
HCT VFR BLD CALC: 23 % — LOW (ref 39–50)
HGB BLD-MCNC: 7.6 G/DL — LOW (ref 13–17)
HGB BLD-MCNC: 7.6 G/DL — LOW (ref 13–17)
HYPOCHROMIA BLD QL: SLIGHT — SIGNIFICANT CHANGE UP
INR BLD: 1.36 — HIGH (ref 0.88–1.16)
LYMPHOCYTES # BLD AUTO: 0.24 K/UL — LOW (ref 1–3.3)
LYMPHOCYTES # BLD AUTO: 2.7 % — LOW (ref 13–44)
MACROCYTES BLD QL: SLIGHT — SIGNIFICANT CHANGE UP
MAGNESIUM SERPL-MCNC: 2 MG/DL — SIGNIFICANT CHANGE UP (ref 1.6–2.6)
MAGNESIUM SERPL-MCNC: 2.1 MG/DL — SIGNIFICANT CHANGE UP (ref 1.6–2.6)
MANUAL SMEAR VERIFICATION: SIGNIFICANT CHANGE UP
MCHC RBC-ENTMCNC: 29.3 PG — SIGNIFICANT CHANGE UP (ref 27–34)
MCHC RBC-ENTMCNC: 29.5 PG — SIGNIFICANT CHANGE UP (ref 27–34)
MCHC RBC-ENTMCNC: 33 GM/DL — SIGNIFICANT CHANGE UP (ref 32–36)
MCHC RBC-ENTMCNC: 33 GM/DL — SIGNIFICANT CHANGE UP (ref 32–36)
MCV RBC AUTO: 88.8 FL — SIGNIFICANT CHANGE UP (ref 80–100)
MCV RBC AUTO: 89.1 FL — SIGNIFICANT CHANGE UP (ref 80–100)
MICROCYTES BLD QL: SLIGHT — SIGNIFICANT CHANGE UP
MONOCYTES # BLD AUTO: 0.32 K/UL — SIGNIFICANT CHANGE UP (ref 0–0.9)
MONOCYTES NFR BLD AUTO: 3.6 % — SIGNIFICANT CHANGE UP (ref 2–14)
NEUTROPHILS # BLD AUTO: 6.39 K/UL — SIGNIFICANT CHANGE UP (ref 1.8–7.4)
NEUTROPHILS NFR BLD AUTO: 71.2 % — SIGNIFICANT CHANGE UP (ref 43–77)
NEUTS BAND # BLD: 1.8 % — SIGNIFICANT CHANGE UP (ref 0–8)
NRBC # BLD: 0 /100 WBCS — SIGNIFICANT CHANGE UP (ref 0–0)
OVALOCYTES BLD QL SMEAR: SLIGHT — SIGNIFICANT CHANGE UP
PCO2 BLDA: 40 MMHG — SIGNIFICANT CHANGE UP (ref 35–48)
PH BLDA: 7.41 — SIGNIFICANT CHANGE UP (ref 7.35–7.45)
PHOSPHATE SERPL-MCNC: 3.8 MG/DL — SIGNIFICANT CHANGE UP (ref 2.5–4.5)
PHOSPHATE SERPL-MCNC: 3.9 MG/DL — SIGNIFICANT CHANGE UP (ref 2.5–4.5)
PLAT MORPH BLD: NORMAL — SIGNIFICANT CHANGE UP
PLATELET # BLD AUTO: 82 K/UL — LOW (ref 150–400)
PLATELET # BLD AUTO: 83 K/UL — LOW (ref 150–400)
PO2 BLDA: 141 MMHG — HIGH (ref 83–108)
POLYCHROMASIA BLD QL SMEAR: SLIGHT — SIGNIFICANT CHANGE UP
POTASSIUM SERPL-MCNC: 4.2 MMOL/L — SIGNIFICANT CHANGE UP (ref 3.5–5.3)
POTASSIUM SERPL-MCNC: 4.2 MMOL/L — SIGNIFICANT CHANGE UP (ref 3.5–5.3)
POTASSIUM SERPL-SCNC: 4.2 MMOL/L — SIGNIFICANT CHANGE UP (ref 3.5–5.3)
POTASSIUM SERPL-SCNC: 4.2 MMOL/L — SIGNIFICANT CHANGE UP (ref 3.5–5.3)
PROT SERPL-MCNC: 5.3 G/DL — LOW (ref 6–8.3)
PROTHROM AB SERPL-ACNC: 16.2 SEC — HIGH (ref 10.5–13.4)
RBC # BLD: 2.58 M/UL — LOW (ref 4.2–5.8)
RBC # BLD: 2.59 M/UL — LOW (ref 4.2–5.8)
RBC # FLD: 16.9 % — HIGH (ref 10.3–14.5)
RBC # FLD: 17.2 % — HIGH (ref 10.3–14.5)
RBC BLD AUTO: ABNORMAL
SAO2 % BLDA: 99.2 % — HIGH (ref 94–98)
SMUDGE CELLS # BLD: PRESENT — SIGNIFICANT CHANGE UP
SODIUM SERPL-SCNC: 132 MMOL/L — LOW (ref 135–145)
SODIUM SERPL-SCNC: 133 MMOL/L — LOW (ref 135–145)
VARIANT LYMPHS # BLD: 0.9 % — SIGNIFICANT CHANGE UP (ref 0–6)
WBC # BLD: 8.75 K/UL — SIGNIFICANT CHANGE UP (ref 3.8–10.5)
WBC # BLD: 8.75 K/UL — SIGNIFICANT CHANGE UP (ref 3.8–10.5)
WBC # FLD AUTO: 8.75 K/UL — SIGNIFICANT CHANGE UP (ref 3.8–10.5)
WBC # FLD AUTO: 8.75 K/UL — SIGNIFICANT CHANGE UP (ref 3.8–10.5)

## 2023-06-06 PROCEDURE — 99233 SBSQ HOSP IP/OBS HIGH 50: CPT | Mod: GC

## 2023-06-06 PROCEDURE — 90945 DIALYSIS ONE EVALUATION: CPT

## 2023-06-06 PROCEDURE — 71045 X-RAY EXAM CHEST 1 VIEW: CPT | Mod: 26,77

## 2023-06-06 PROCEDURE — 99232 SBSQ HOSP IP/OBS MODERATE 35: CPT

## 2023-06-06 PROCEDURE — 71045 X-RAY EXAM CHEST 1 VIEW: CPT | Mod: 26

## 2023-06-06 RX ORDER — I.V. FAT EMULSION 20 G/100ML
0.7 EMULSION INTRAVENOUS
Qty: 50 | Refills: 0 | Status: DISCONTINUED | OUTPATIENT
Start: 2023-06-06 | End: 2023-06-06

## 2023-06-06 RX ORDER — ELECTROLYTE SOLUTION,INJ
1 VIAL (ML) INTRAVENOUS
Refills: 0 | Status: DISCONTINUED | OUTPATIENT
Start: 2023-06-06 | End: 2023-06-06

## 2023-06-06 RX ORDER — FENTANYL CITRATE 50 UG/ML
0.5 INJECTION INTRAVENOUS
Qty: 2500 | Refills: 0 | Status: DISCONTINUED | OUTPATIENT
Start: 2023-06-06 | End: 2023-06-06

## 2023-06-06 RX ORDER — FENTANYL CITRATE 50 UG/ML
1 INJECTION INTRAVENOUS
Qty: 5000 | Refills: 0 | Status: DISCONTINUED | OUTPATIENT
Start: 2023-06-06 | End: 2023-06-08

## 2023-06-06 RX ORDER — ACETAMINOPHEN 500 MG
1000 TABLET ORAL ONCE
Refills: 0 | Status: COMPLETED | OUTPATIENT
Start: 2023-06-06 | End: 2023-06-06

## 2023-06-06 RX ORDER — DEXMEDETOMIDINE HYDROCHLORIDE IN 0.9% SODIUM CHLORIDE 4 UG/ML
0.7 INJECTION INTRAVENOUS
Qty: 400 | Refills: 0 | Status: DISCONTINUED | OUTPATIENT
Start: 2023-06-06 | End: 2023-06-13

## 2023-06-06 RX ADMIN — Medication 3 MILLILITER(S): at 21:20

## 2023-06-06 RX ADMIN — PANTOPRAZOLE SODIUM 40 MILLIGRAM(S): 20 TABLET, DELAYED RELEASE ORAL at 12:35

## 2023-06-06 RX ADMIN — SODIUM CHLORIDE 4 MILLILITER(S): 9 INJECTION INTRAMUSCULAR; INTRAVENOUS; SUBCUTANEOUS at 10:17

## 2023-06-06 RX ADMIN — SODIUM CHLORIDE 4 MILLILITER(S): 9 INJECTION INTRAMUSCULAR; INTRAVENOUS; SUBCUTANEOUS at 21:20

## 2023-06-06 RX ADMIN — Medication 3 MILLILITER(S): at 10:16

## 2023-06-06 RX ADMIN — SODIUM CHLORIDE 4 MILLILITER(S): 9 INJECTION INTRAMUSCULAR; INTRAVENOUS; SUBCUTANEOUS at 18:14

## 2023-06-06 RX ADMIN — Medication 1000 MILLIGRAM(S): at 23:45

## 2023-06-06 RX ADMIN — LACOSAMIDE 150 MILLIGRAM(S): 50 TABLET ORAL at 18:18

## 2023-06-06 RX ADMIN — CHLORHEXIDINE GLUCONATE 15 MILLILITER(S): 213 SOLUTION TOPICAL at 17:57

## 2023-06-06 RX ADMIN — Medication 3 MILLILITER(S): at 18:13

## 2023-06-06 RX ADMIN — Medication 3 MILLILITER(S): at 05:29

## 2023-06-06 RX ADMIN — Medication 4 MILLILITER(S): at 18:13

## 2023-06-06 RX ADMIN — Medication 1 DROP(S): at 18:18

## 2023-06-06 RX ADMIN — Medication 4 MILLILITER(S): at 05:30

## 2023-06-06 RX ADMIN — CHLORHEXIDINE GLUCONATE 1 APPLICATION(S): 213 SOLUTION TOPICAL at 12:35

## 2023-06-06 RX ADMIN — SODIUM CHLORIDE 4 MILLILITER(S): 9 INJECTION INTRAMUSCULAR; INTRAVENOUS; SUBCUTANEOUS at 05:30

## 2023-06-06 RX ADMIN — Medication 4 MILLILITER(S): at 21:19

## 2023-06-06 RX ADMIN — Medication 400 MILLIGRAM(S): at 23:21

## 2023-06-06 RX ADMIN — DEXMEDETOMIDINE HYDROCHLORIDE IN 0.9% SODIUM CHLORIDE 17.5 MICROGRAM(S)/KG/HR: 4 INJECTION INTRAVENOUS at 12:07

## 2023-06-06 RX ADMIN — FENTANYL CITRATE 1.75 MICROGRAM(S)/KG/HR: 50 INJECTION INTRAVENOUS at 16:09

## 2023-06-06 RX ADMIN — Medication 4 MILLILITER(S): at 10:17

## 2023-06-06 RX ADMIN — Medication 1 DROP(S): at 07:13

## 2023-06-06 RX ADMIN — DEXMEDETOMIDINE HYDROCHLORIDE IN 0.9% SODIUM CHLORIDE 17.5 MICROGRAM(S)/KG/HR: 4 INJECTION INTRAVENOUS at 17:56

## 2023-06-06 RX ADMIN — I.V. FAT EMULSION 20.83 GM/KG/DAY: 20 EMULSION INTRAVENOUS at 18:19

## 2023-06-06 RX ADMIN — CHLORHEXIDINE GLUCONATE 15 MILLILITER(S): 213 SOLUTION TOPICAL at 07:24

## 2023-06-06 RX ADMIN — Medication 1 EACH: at 18:18

## 2023-06-06 RX ADMIN — CEFTRIAXONE 100 MILLIGRAM(S): 500 INJECTION, POWDER, FOR SOLUTION INTRAMUSCULAR; INTRAVENOUS at 12:34

## 2023-06-06 RX ADMIN — DEXMEDETOMIDINE HYDROCHLORIDE IN 0.9% SODIUM CHLORIDE 17.5 MICROGRAM(S)/KG/HR: 4 INJECTION INTRAVENOUS at 04:50

## 2023-06-06 RX ADMIN — Medication 400 MILLIGRAM(S): at 17:57

## 2023-06-06 RX ADMIN — FENTANYL CITRATE 3.5 MICROGRAM(S)/KG/HR: 50 INJECTION INTRAVENOUS at 03:45

## 2023-06-06 RX ADMIN — LACOSAMIDE 150 MILLIGRAM(S): 50 TABLET ORAL at 06:12

## 2023-06-06 NOTE — PROGRESS NOTE ADULT - ASSESSMENT
52 Y Male w/ HTN, aortic dissection previous admission with severe cardiogenic shock and oliguric TREY requiring CVVHD. Presented to the ED 5/5 with worsening epigastric pain CTA/P revealed continued endoleak hospital course complicated by necrotic/hemorrhagic pancreatitis. Nephrology following for oliguric renal failure, CVVHD dependent    #non-oliguric ATN 2/2 cardiogenic shock    recommend:  Pt now non-oliguric with 760cc of urine output over last 24 hours. In general, patient still volume overloaded and suspect will still need some aide with aquapharesis or HD to help mobilize fluid. Overall hemodynamics improving,   -would recommend letting CVVHD bags finish and not replenishing OR if cartridge fails (whichever comes first) can stop CVVHD, observe and then put on aquapheresis with intention of iHD on 6/7  -check phos daily  maintain MAP > 70 for adequate renal perfusion  strict i's and o's  renally dose abx egfr <15  Bladder scan BID to assess for signs of renal recovery     #Seen on CVVHD  Tolerating well. Continue as above.

## 2023-06-06 NOTE — PROGRESS NOTE ADULT - ASSESSMENT
53M h/o seizure d/o, aortic dissection s/p AVR, aortic graft revision 3/20/23, 2nd stage TEVAR 5/5/23, course c/b peripancreatic/RP hemorrhage/hematoma formation s/p multiple washout and recurrent respiratory failure with most recent reintubation on 6/3 due to excessive secretions and difficulty protecting airway. Pulmonary consulted for percutaneous tracheostomy.     #Recurrent Respiratory Failure   #Critical Illness Neuropathy/Myopathy  #Peripheral eosinophilia     S/p percutaneous tracheostomy placement done at bedside 6/6. Site appears clean, no evidence of gross bleeding. He is tolerating pressure support trials and analgesia has been adquate. Of note, he has an increasing peripheral eosinophilia, which suspect is likely due to a large number of causative drugs which he has been receiving. He had undergone a prior bronchoscopy with BAL in end of May prior to the eruption of his peripheral eosinophilia - diagnostic studies not reported.       Recommend:   - c/w analgesia for first 24 hours after trach before wean  - vent management and weaning per primary team   - eosinophilia workup per primary team  53M h/o seizure d/o, aortic dissection s/p AVR, aortic graft revision 3/20/23, 2nd stage TEVAR 5/5/23, course c/b peripancreatic/RP hemorrhage/hematoma formation s/p multiple washout and recurrent respiratory failure with most recent reintubation on 6/3 due to excessive secretions and difficulty protecting airway. Pulmonary consulted for percutaneous tracheostomy.     #Recurrent Respiratory Failure   #Critical Illness Neuropathy/Myopathy  #Peripheral eosinophilia     S/p percutaneous tracheostomy placement done at bedside 6/6. Site appears clean, no evidence of gross bleeding. He is tolerating pressure support trials and analgesia has been adquate. Of note, he has an increasing peripheral eosinophilia, which suspect is likely due to a large number of causative drugs which he has been receiving. He had undergone a prior bronchoscopy with BAL in end of May prior to the eruption of his peripheral eosinophilia - diagnostic studies not reported.       Recommend:   - c/w analgesia for first 24 hours after trach before wean  - vent management and weaning per primary team   - eosinophilia workup per primary team     Pulmonary service to sign off. Please call back with any issues.

## 2023-06-06 NOTE — PROGRESS NOTE ADULT - ATTENDING COMMENTS
Acute respiratory failure with unable to wean from vent, failed extubation. Tracheostomy was done yesterday. No post op complication. we will sign off.

## 2023-06-06 NOTE — PROGRESS NOTE ADULT - SUBJECTIVE AND OBJECTIVE BOX
Patient seen bedside. Drain outputs are as follows:    1. 16 Fr teal LUQ peripancreatic abscess "Left Pancreatic CODIE Drain" placed on 5/24 (attached to gravity bag):   -315 cc serosanguinous output in 24 hrs. 265 cc in previous 24 hrs. Flushed BID per nursing.     2. 16 Fr teal LUQ hematoma "Left Abdominal Hematoma CODIE Drain" placed on 5/25 and upsized on 5/26:   -125 cc dark brown/sanguinous output in 24 hrs. 55 cc in previous 24 hrs. Flushed easily with 3cc sterile NS.     H/H 7.6/23.0 today, up from 6.9/20.8 yd. HR 100s-120s past 24 hrs.

## 2023-06-06 NOTE — OCCUPATIONAL THERAPY INITIAL EVALUATION ADULT - GENERAL OBSERVATIONS, REHAB EVAL
Pt's RN Marcella aware of intent to eval/tx; cleared Pt. Pt received in supine - ++VEEG, vent to trach/CPAP FIO2 40%, +CVVHD, b/l SCDs w/ CAIR boots +4 CODIE drains, abdominal staples, [place] cath, IVs.

## 2023-06-06 NOTE — PROGRESS NOTE ADULT - ATTENDING COMMENTS
iATN now now longer oliguric, 760ml urine last 24hrs, net negative 1L between UO and CVVHD. Will stop CRRT, switch to Aquapheresis for volume removal and intermittent hemodialysis for clearance as needed

## 2023-06-06 NOTE — PROGRESS NOTE ADULT - SUBJECTIVE AND OBJECTIVE BOX
ON: NGT advanced, in appropriate position. Sedation weaned. CPAP since 0400  6/5: 1U PLT ordered per pulm, Hg 6.9--1U PRBC ordered. Panc drain to gravity. Switched to CTX. 2pm post-transfusion Hb 8.2 (6.9), Plt 75. s/p bedside trach w 2nd U PLT. NGT placed for bilious oral secretions. Renal plan for net neg 500-1L.      SUBJECTIVE: Patient seen and examined bedside; doing well this am after trach, on CPAP, off levophed, on precedex/fentanyl, drains flushed and draining appropriately.    cefTRIAXone   IVPB 2000 milliGRAM(s) IV Intermittent every 24 hours  norepinephrine Infusion 0.05 MICROgram(s)/kG/Min IV Continuous <Continuous>      Vital Signs Last 24 Hrs  T(C): 37.3 (06 Jun 2023 01:10), Max: 37.3 (06 Jun 2023 01:10)  T(F): 99.1 (06 Jun 2023 01:10), Max: 99.1 (06 Jun 2023 01:10)  HR: 108 (06 Jun 2023 05:00) (69 - 108)  BP: --  BP(mean): --  RR: 12 (06 Jun 2023 05:00) (12 - 61)  SpO2: 100% (06 Jun 2023 05:00) (100% - 100%)    Parameters below as of 06 Jun 2023 06:00  Patient On (Oxygen Delivery Method): ventilator, CPAP    O2 Concentration (%): 40  I&O's Detail    04 Jun 2023 07:01  -  05 Jun 2023 07:00  --------------------------------------------------------  IN:    Albumin 5%  - 250 mL: 125 mL    Dexmedetomidine: 419.8 mL    Fat Emulsion (Fish Oil &amp; Plant Based) 20% Infusion: 270.4 mL    FentaNYL: 169.8 mL    IV PiggyBack: 125 mL    IV PiggyBack: 150 mL    IV PiggyBack: 250 mL    Nepro with Carb Steady: 160 mL    Norepinephrine: 113.4 mL    Other (mL): 20 mL    Platelets - Single Donor: 60 mL    TPN (Total Parenteral Nutrition): 1680 mL  Total IN: 3543.4 mL    OUT:    Bulb (mL): 275 mL    Bulb (mL): 20 mL    Bulb (mL): 45 mL    Drain (mL): 55 mL    Drain (mL): 265 mL    Fat Emulsion (Fish Oil &amp; Plant Based) 20% Infusion: 0 mL    Incontinent per Condom Catheter (mL): 225 mL    Other (mL): 2309 mL    Rectal Tube (mL): 100 mL    Voided (mL): 230 mL  Total OUT: 3524 mL    Total NET: 19.4 mL      05 Jun 2023 07:01  -  06 Jun 2023 06:14  --------------------------------------------------------  IN:    Dexmedetomidine: 198.1 mL    Dexmedetomidine: 26.3 mL    Fat Emulsion (Fish Oil &amp; Plant Based) 20% Infusion: 270.4 mL    FentaNYL: 8.8 mL    FentaNYL: 190.7 mL    FentaNYL: 10.5 mL    IV PiggyBack: 50 mL    IV PiggyBack: 50 mL    Nepro with Carb Steady: 80 mL    Norepinephrine: 89.7 mL    Other (mL): 20 mL    Platelets - Single Donor: 207 mL    PRBCs (Packed Red Blood Cells): 330 mL    Propofol: 171 mL    TPN (Total Parenteral Nutrition): 1540 mL  Total IN: 3242.3 mL    OUT:    Bulb (mL): 135 mL    Bulb (mL): 85 mL    Bulb (mL): 150 mL    Drain (mL): 315 mL    Drain (mL): 95 mL    Fat Emulsion (Fish Oil &amp; Plant Based) 20% Infusion: 0 mL    Incontinent per Condom Catheter (mL): 660 mL    Other (mL): 2758 mL  Total OUT: 4198 mL    Total NET: -955.7 mL      PE:    General: NAD, resting comfortably in bed  C/V: NSR, s1 s2; off levo  Pulm: Trach to vent, nonlabored breathing, no respiratory distress  Abd: Soft, NTND; drains in place continue to drain   Extrem: [facility]        LABS:                        7.6    8.75  )-----------( 83       ( 06 Jun 2023 04:27 )             23.0     06-06    133<L>  |  102  |  27<H>  ----------------------------<  120<H>  4.2   |  24  |  0.63    Ca    7.8<L>      06 Jun 2023 04:27  Phos  3.8     06-06  Mg     2.0     06-06    TPro  5.3<L>  /  Alb  2.1<L>  /  TBili  1.5<H>  /  DBili  x   /  AST  18  /  ALT  10  /  AlkPhos  215<H>  06-06    PT/INR - ( 06 Jun 2023 04:27 )   PT: 16.2 sec;   INR: 1.36          PTT - ( 06 Jun 2023 04:27 )  PTT:34.1 sec      RADIOLOGY & ADDITIONAL STUDIES:

## 2023-06-06 NOTE — OCCUPATIONAL THERAPY INITIAL EVALUATION ADULT - ADDITIONAL COMMENTS
Pt trached at this time and unable to provided prior functional history report. Per chart, pt was mostly independent prior to arrival after returning home from previous hospital stay. Ptused a RW for community ambulation.

## 2023-06-06 NOTE — OCCUPATIONAL THERAPY INITIAL EVALUATION ADULT - MD ORDER
S/P Second stage thoracic endovascular aortic repair (TEVAR) on 5/5/23  +Endo leak, Abdominal pain  S/P 5/30 LUQ drainage, 6/1 EGD, Ex lap, & J tube placement, 6/3 failed extubation.

## 2023-06-06 NOTE — PROGRESS NOTE ADULT - SUBJECTIVE AND OBJECTIVE BOX
INFECTIOUS DISEASES CONSULT FOLLOW-UP NOTE    INTERVAL HPI/OVERNIGHT EVENTS:    Mild clinical improvement this AM, tolerating CPAP       ROS:   Constitutional, eyes, ENT, cardiovascular, respiratory, gastrointestinal, genitourinary, integumentary, neurological, psychiatric and heme/lymph are otherwise negative other than noted above       ANTIBIOTICS/RELEVANT:    MEDICATIONS  (STANDING):  acetylcysteine 20%  Inhalation 4 milliLiter(s) Inhalation every 6 hours  albuterol/ipratropium for Nebulization 3 milliLiter(s) Nebulizer every 6 hours  artificial  tears Solution 1 Drop(s) Both EYES two times a day  cefTRIAXone   IVPB 2000 milliGRAM(s) IV Intermittent every 24 hours  chlorhexidine 0.12% Liquid 15 milliLiter(s) Oral Mucosa every 12 hours  chlorhexidine 2% Cloths 1 Application(s) Topical daily  CRRT Treatment    <Continuous>  dexMEDEtomidine Infusion 1 MICROgram(s)/kG/Hr (17.5 mL/Hr) IV Continuous <Continuous>  dextrose 50% Injectable 25 Gram(s) IV Push once  fentaNYL   Infusion... 0.5 MICROgram(s)/kG/Hr (1.75 mL/Hr) IV Continuous <Continuous>  glucagon  Injectable 1 milliGRAM(s) IntraMuscular once  lacosamide IVPB 250 milliGRAM(s) IV Intermittent every 12 hours  lipid, fat emulsion (Fish Oil and Plant Based) 20% Infusion 0.7 Gm/kG/Day (20.83 mL/Hr) IV Continuous <Continuous>  norepinephrine Infusion 0.05 MICROgram(s)/kG/Min (6.56 mL/Hr) IV Continuous <Continuous>  pantoprazole  Injectable 40 milliGRAM(s) IV Push daily  Parenteral Nutrition - Adult 1 Each (70 mL/Hr) TPN Continuous <Continuous>  Phoxillum Filtration BK 4 / 2.5 5000 milliLiter(s) (1500 mL/Hr) CRRT <Continuous>  sodium chloride 3%  Inhalation 4 milliLiter(s) Inhalation every 6 hours    MEDICATIONS  (PRN):  dextrose Oral Gel 15 Gram(s) Oral once PRN Blood Glucose LESS THAN 70 milliGRAM(s)/deciliter  sodium chloride 0.9% lock flush 10 milliLiter(s) IV Push every 1 hour PRN Pre/post blood products, medications, blood draw, and to maintain line patency        Vital Signs Last 24 Hrs  T(C): 37.1 (06 Jun 2023 09:00), Max: 37.3 (06 Jun 2023 01:10)  T(F): 98.8 (06 Jun 2023 09:00), Max: 99.1 (06 Jun 2023 01:10)  HR: 110 (06 Jun 2023 10:00) (69 - 125)  BP: --  BP(mean): --  RR: 19 (06 Jun 2023 10:00) (12 - 51)  SpO2: 100% (06 Jun 2023 10:00) (97% - 100%)    Parameters below as of 06 Jun 2023 10:00  Patient On (Oxygen Delivery Method): ventilator, CPAP        06-05-23 @ 07:01  -  06-06-23 @ 07:00  --------------------------------------------------------  IN: 3499.8 mL / OUT: 4558 mL / NET: -1058.2 mL    06-06-23 @ 07:01  -  06-06-23 @ 10:40  --------------------------------------------------------  IN: 326 mL / OUT: 267 mL / NET: 59 mL      PHYSICAL EXAM:  Constitutional: more responsive this AM   ENT: the ears and nose were normal in appearance.  Trached   Neck: the appearance of the neck was normal and the neck was supple.   Pulmonary: no respiratory distress and lungs were clear to auscultation bilaterally.   Heart: heart rate was normal and rhythm regular, normal S1 and S2  Abdomen: soft, multiple drains        LABS:                        7.6    8.75  )-----------( 83       ( 06 Jun 2023 04:27 )             23.0     06-06    133<L>  |  102  |  27<H>  ----------------------------<  120<H>  4.2   |  24  |  0.63    Ca    7.8<L>      06 Jun 2023 04:27  Phos  3.8     06-06  Mg     2.0     06-06    TPro  5.3<L>  /  Alb  2.1<L>  /  TBili  1.5<H>  /  DBili  x   /  AST  18  /  ALT  10  /  AlkPhos  215<H>  06-06    PT/INR - ( 06 Jun 2023 04:27 )   PT: 16.2 sec;   INR: 1.36          PTT - ( 06 Jun 2023 04:27 )  PTT:34.1 sec      MICROBIOLOGY:      RADIOLOGY & ADDITIONAL STUDIES:  Reviewed

## 2023-06-06 NOTE — PROGRESS NOTE ADULT - ATTENDING COMMENTS
Agree with above.  Patient has resolution of fever and leukocytosis.  Recommend continuing Ceftriaxone.  Recommend at least 14 days of antibiotics from the last washout (ends 6/15/23). Ok to stop and watch off antibiotics on 6/15 provided he is clinically stable and has no evidence of active infection. ID team 1 will sign off.  Please reconsult us with any questions or changes in his condition

## 2023-06-06 NOTE — PROGRESS NOTE ADULT - ASSESSMENT
53yMale w/ PMHx of HTN, type A aortic dissection s/p Dacron grafts, AV resuspension in 2013, CAD s/p CABG x 1 SVG to RCA (5/2013 with Dr. Medina), seizure disorder, recent admission 3/20-4/14 for replacement of transverse aortic arch, second stage TEVAR and aorto-axillary bypass, AV replacement (bio 23mm), and CABG x 1 (SVG-RCA). Pt found to have endo leak and taken to OR for TEVAR revision on 5/5, Post op course c/b Klebsiella bacteremia (5/15), resp failure requiring intubation on 5/18, Mucus plugging s/p Bronch 5/19 and PEA arrest. Post operatively pt also found to have hemorrhagic pancreatitis with venu-pancreatic abscess possibly 2* to Depakote therefore s/p IR aspiration of peripancreatic fluid collection and paracentesis on 5/22, IR venu-pancreatic abscess drainage and placement of drainage catheter on 5/24. Pt then transferred from CTICU to SICU for further mgmt of hemorrhagic pancreatitis on 5/25. s/p IR placement of 2 new drainage catheters and catheter exchange on 5/26. LUQ drainage 5/30. OR 5/31 exlap washout & replacement of 3 new JPs and abthera vac with open abdomen. RTOR 6/1, no bleeding, evac of old blood, placement of feeding J-tube.     NEURO: Sedation: Fentanyl gtts, Precedex gtt. Hx seizures: epilepsy recs appreciated, Cont vimpat. Depakote weaned off due to concerns for drug-related hemorrhagic pancreatitis. Repeat EEG (ordered)  HEENT: Artificial tears  CV: MAP > 65 on levophed gtt (likely sedation). s/p PEA arrest on 5/24 night. Echo from 5/19 hyperdynamic LV, SHAJI with LVOTO (gradient 26), normal RV, mild MR, no pulm HTN. ASA 81mg held 2/2 to acute blood loss anemia.    PULM: failed extubation 6/3: s/p Trach 6/5: AC 40/500/22/8 with daily CPAP trial. ARDS resolved - off NO, s/p multiple Mucus plug/ Lung collapse s/p bronch x3 (most recently on 5/26) most likely from outward obstruction: Cont rotating pt around the clock. Cont Duonebs, Mucomyst, 3% saline.  GI/FEN: TPN, Trickle TF Jevity @20 via feeding J-tube for procedure, resume post procedure, Protonix BID, Hemorrhagic pancreatitis: s/p multiple drain placements and paracentisis by IR team.  FOBT+ 5/29. Constipation: resolved, now diarrhea: d/c Rectal tube 6/4  : TREY on CVVHD - continue with fluid removal today s/p product administration. Nephro following. Cantrell d/c'ed 5/26 - Continue bladder scan daily for poss straight cath.   HEME: s/p 1U PRBC, 1 PLT- Additional PLT for tracheostomy (6/5). Acute blood loss anemia requiring multiple transfusions  ENDO: mISS  ID: Start Ceftriaxone 2g qd( 6/5- 6/15) , ID following. Kleb bacteremia from 5/15 & 5/17, subsequent bld cx negative, /// DC: Chloe (5/21-6/5),  Caspo (5/23-5/30), Ampicillin ( 5/21- 5/27). PNA w/ bronch cx kleb, enterobacter (zosyn, erta resistant), E faecalis, strep angionosus from 5/19, pancreatic abscess cx pan-sensitive kleb 5/22.   PPX: SCDs, SQH on hold.   LINES: L rad kalpana (5/31--), Lsubclav HD Cath (5/31--), RIJ TLC (6/1--) LIJ TLC (5/23--) // DC: RIJ TLC (5/22-5/27), RIJ HD cath (5/22-31), R Ax kalpana(5/12-5/31)  WOUNDS/DRAINS: right OR CODIE, 2 left OR CODIE, left IR hematoma drain, left IR pancreatic drain (bilious) to BID drainage w/ 250 NS.   PT: ordered 6/5.   Dispo: SICU

## 2023-06-06 NOTE — PROGRESS NOTE ADULT - ASSESSMENT
53-year-old male with PMHx of HTN, Type A aortic dissection s/p Dacron grafts, AV resuspension in 2013, CAD s/p CABG x 1 SVG to RCA (05/2013, Dr. Medina), seizure disorder (last episode on 07/04/2022) S/P replacement of transverse aortic arch, second stage thoracic endovascular aortic repair, aorto-axillary bypass, AV replacement (bio 23mm), in APr 2023 and CABG x 1 (SVG-RCA) EF 75% (Ignacio, 03/2020) now s/p 2nd state TEVAR (05/05/2023) prompting General Surgery consult for acute pancreatitis with large peripancreatic/perigastric fluid collections on CTA abdomen 05/08/2023 followed by acute hemorrhage starting 05/12/2023 requiring 11 U pRBC, but with negative mesenteric angiogram 05/13/2023 prompting Hepatobiliary Surgery reconsult for possible hemorrhagic pancreatitis, 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  Patient is s/p OR washout (05/31/2023), drains (RP, LUQ, pelvis) placement, and abthera wound vac. S/p percutaneous tracheostomy 06/05.    Plan:    -Wean off sedation  -Neurology for EEG  -Repeat CBC @noon  -Monitor NGT output  -Continue TFs 20cc/hr  -Monitor drain output  -Continue bulb flushes and to gravity after flushing finished  -Rest of care per SICU  -Surgery team1 following

## 2023-06-06 NOTE — OCCUPATIONAL THERAPY INITIAL EVALUATION ADULT - PERTINENT HX OF CURRENT PROBLEM, REHAB EVAL
54yo Male pt with PMH HTN, type A aortic dissection s/p Dacron grafts, AV resuspension in 2013, CAD s/p CABG x 1 SVG to RCA,, seizure disorder (last episode on 7/4/22) S/P replacement of transverse aortic arch, second stage thoracic endovascular aortic repair, aorto-axillary bypass, AV replacement (bio 23mm), in APr 2023 and CABG x 1 (SVG-RCA) EF 75%, now s/p 2nd state TEVAR on 5/5 for whom surgery was consulted for finding of acute pancreatitis with large peripancreatic/perigastric fluid collections on CTA abdomen 5/8 then acute hemorrhage starting 5/12/23 requiring 11 U pRBC over last 48 hours but with negative mesenteric angiogram 5/13/23. S/p paracentesis and LUQ collection aspiration (no drain per surgery) on 5/22/23. LUQ collection growing Klebsiella pneumoniae.

## 2023-06-06 NOTE — PROGRESS NOTE ADULT - SUBJECTIVE AND OBJECTIVE BOX
SUBJECTIVE: Patient seen and evaluated.        MEDICATIONS  (STANDING):  acetaminophen   IVPB .. 1000 milliGRAM(s) IV Intermittent once  acetylcysteine 20%  Inhalation 4 milliLiter(s) Inhalation every 6 hours  albuterol/ipratropium for Nebulization 3 milliLiter(s) Nebulizer every 6 hours  artificial  tears Solution 1 Drop(s) Both EYES two times a day  cefTRIAXone   IVPB 2000 milliGRAM(s) IV Intermittent every 24 hours  chlorhexidine 0.12% Liquid 15 milliLiter(s) Oral Mucosa every 12 hours  chlorhexidine 2% Cloths 1 Application(s) Topical daily  CRRT Treatment    <Continuous>  dexMEDEtomidine Infusion 1 MICROgram(s)/kG/Hr (17.5 mL/Hr) IV Continuous <Continuous>  dextrose 50% Injectable 25 Gram(s) IV Push once  fentaNYL   Infusion... 0.5 MICROgram(s)/kG/Hr (1.75 mL/Hr) IV Continuous <Continuous>  glucagon  Injectable 1 milliGRAM(s) IntraMuscular once  lacosamide IVPB 250 milliGRAM(s) IV Intermittent every 12 hours  lipid, fat emulsion (Fish Oil and Plant Based) 20% Infusion 0.7 Gm/kG/Day (20.83 mL/Hr) IV Continuous <Continuous>  pantoprazole  Injectable 40 milliGRAM(s) IV Push daily  Parenteral Nutrition - Adult 1 Each (70 mL/Hr) TPN Continuous <Continuous>  Parenteral Nutrition - Adult 1 Each (70 mL/Hr) TPN Continuous <Continuous>  Phoxillum Filtration BK 4 / 2.5 5000 milliLiter(s) (1500 mL/Hr) CRRT <Continuous>  sodium chloride 3%  Inhalation 4 milliLiter(s) Inhalation every 6 hours    MEDICATIONS  (PRN):  dextrose Oral Gel 15 Gram(s) Oral once PRN Blood Glucose LESS THAN 70 milliGRAM(s)/deciliter  sodium chloride 0.9% lock flush 10 milliLiter(s) IV Push every 1 hour PRN Pre/post blood products, medications, blood draw, and to maintain line patency      Vital Signs Last 24 Hrs  T(C): 38.2 (06 Jun 2023 16:00), Max: 38.2 (06 Jun 2023 16:00)  T(F): 100.7 (06 Jun 2023 16:00), Max: 100.7 (06 Jun 2023 16:00)  HR: 126 (06 Jun 2023 17:00) (72 - 134)  BP: --  BP(mean): --  RR: 18 (06 Jun 2023 17:00) (12 - 63)  SpO2: 100% (06 Jun 2023 17:00) (97% - 100%)    Parameters below as of 06 Jun 2023 17:00  Patient On (Oxygen Delivery Method): ventilator        Physical Exam:  General: NAD, resting comfortably in bed  Neuro: Sedated, RASS -4  Pulmonary: Nonlabored, decrease breath sounds in b/l bases, intubated AC/VC  Mode: CPAP  Cardiovascular: sinus tachycardia, S1 S2  Abdominal: soft, mod distention, unable to assess tenderness, midline island dressing with minimal strike, CODIE drains all SS, Pancreatic drain to gravity, J tube patent  Extremities: edematous  : condom catheter in place    I&O's Summary    05 Jun 2023 07:01  -  06 Jun 2023 07:00  --------------------------------------------------------  IN: 3499.8 mL / OUT: 4558 mL / NET: -1058.2 mL    06 Jun 2023 07:01  -  06 Jun 2023 17:23  --------------------------------------------------------  IN: 834.3 mL / OUT: 1327 mL / NET: -492.7 mL        LABS:                        7.6    8.75  )-----------( 82       ( 06 Jun 2023 12:33 )             23.0     06-06    132<L>  |  101  |  28<H>  ----------------------------<  119<H>  4.2   |  25  |  0.65    Ca    7.8<L>      06 Jun 2023 12:32  Phos  3.9     06-06  Mg     2.1     06-06    TPro  5.3<L>  /  Alb  2.1<L>  /  TBili  1.5<H>  /  DBili  x   /  AST  18  /  ALT  10  /  AlkPhos  215<H>  06-06    PT/INR - ( 06 Jun 2023 04:27 )   PT: 16.2 sec;   INR: 1.36          PTT - ( 06 Jun 2023 04:27 )  PTT:34.1 sec    CAPILLARY BLOOD GLUCOSE      POCT Blood Glucose.: 119 mg/dL (06 Jun 2023 00:10)    LIVER FUNCTIONS - ( 06 Jun 2023 04:27 )  Alb: 2.1 g/dL / Pro: 5.3 g/dL / ALK PHOS: 215 U/L / ALT: 10 U/L / AST: 18 U/L / GGT: x             RADIOLOGY & ADDITIONAL STUDIES:

## 2023-06-06 NOTE — PROGRESS NOTE ADULT - ASSESSMENT
52yo Male pt with PMH HTN, type A aortic dissection s/p Dacron grafts, AV resuspension in 2013, CAD s/p CABG x 1 SVG to RCA (5/2013 with Dr. Medina), seizure disorder (last episode on 7/4/22) S/P replacement of transverse aortic arch, second stage thoracic endovascular aortic repair, aorto-axillary bypass, AV replacement (bio 23mm), in APr 2023 and CABG x 1 (SVG-RCA) EF 75% (Ignacio, 3/20) now s/p 2nd state TEVAR on 5/5 for whom surgery was consulted for finding of acute pancreatitis with large peripancreatic/perigastric fluid collections on CTA abdomen 5/8 then acute hemorrhage starting 5/12/23 requiring 11 U pRBC over last 48 hours but with negative mesenteric angiogram 5/13/23. S/p paracentesis and LUQ collection aspiration (no drain per surgery) on 5/22/23. LUQ collection growing Klebsiella pneumoniae.    5/24/23:  LUQ drain placement by IR.     5/25/23:  LUQ drain upsizing. Placement of two additional 14 F drainage catheters in the left mid and lower abdomen. Placement of a left abdominal hematoma drain.     5/26/23:   Placement of a right ascites drain and right pelvic hematoma/abscess drain by IR. Upsized left abdomen hematoma drain and left pelvic abscess/hematoma drain to 16 Fr.    5/30/23:  LUQ drain placement.     5/31/23:  To OR for ex-lap and abdominal washout. Multiple IR drains removed as described above.     6/1/23:  Return to OR for washout and abdominal closure.     Plan:  -Continue to monitor drain outputs   -IR will continue to follow.

## 2023-06-06 NOTE — PROGRESS NOTE ADULT - ASSESSMENT
53M h/o seizure d/o, aortic dissection s/p AVR, aortic graft revision 3/20/23, 2nd stage TEVAR 5/5/23, course c/b peripancreatic/RP hemorrhage/hematoma formation s/p multiple washout .  All cultures grew K.pneumo.  Patient unable to tolerate extubation due to secretion.  Continued drop in Hb, though stable from infectious standpoint given dropping wbc and fever curve.     - cont  CTX 2G q24hrs to complete a 14 day course from the day of the last washout     Team 1 will sign off, re-consult as needed.      53M h/o seizure d/o, aortic dissection s/p AVR, aortic graft revision 3/20/23, 2nd stage TEVAR 5/5/23, course c/b peripancreatic/RP hemorrhage/hematoma formation s/p multiple washout .  All cultures grew K.pneumo.  Patient unable to tolerate extubation due to secretion.  Continued drop in Hb, though stable from infectious standpoint given dropping wbc and fever curve and improvement in mental status.     - cont  CTX 2G q24hrs to complete a 14 day course from the day of the last washout     Team 1 will sign off, re-consult as needed.      53M h/o seizure d/o, aortic dissection s/p AVR, aortic graft revision 3/20/23, 2nd stage TEVAR 5/5/23, course c/b peripancreatic/RP hemorrhage/hematoma formation s/p multiple washout .  All cultures grew K.pneumo.  Patient unable to tolerate extubation due to secretion.  Continued drop in Hb, though stable from infectious standpoint given dropping wbc and fever curve and improvement in mental status.     - cont  CTX 2G q24hrs to complete a 14 day course from the day of the last washout (ends 6/15/23)    Team 1 will sign off, re-consult as needed.

## 2023-06-06 NOTE — PROGRESS NOTE ADULT - SUBJECTIVE AND OBJECTIVE BOX
--------------------------------------------------------------------------------  Chief Complaint: ESRD/Ongoing hemodialysis requirement    24 hour events/subjective:        PAST HISTORY  --------------------------------------------------------------------------------  No significant changes to PMH, PSH, FHx, SHx, unless otherwise noted    ALLERGIES & MEDICATIONS  --------------------------------------------------------------------------------  Allergies    No Known Allergies    Intolerances      Standing Inpatient Medications  acetylcysteine 20%  Inhalation 4 milliLiter(s) Inhalation every 6 hours  albuterol/ipratropium for Nebulization 3 milliLiter(s) Nebulizer every 6 hours  artificial  tears Solution 1 Drop(s) Both EYES two times a day  cefTRIAXone   IVPB 2000 milliGRAM(s) IV Intermittent every 24 hours  chlorhexidine 0.12% Liquid 15 milliLiter(s) Oral Mucosa every 12 hours  chlorhexidine 2% Cloths 1 Application(s) Topical daily  CRRT Treatment    <Continuous>  dexMEDEtomidine Infusion 1 MICROgram(s)/kG/Hr IV Continuous <Continuous>  dextrose 50% Injectable 25 Gram(s) IV Push once  fentaNYL   Infusion... 0.5 MICROgram(s)/kG/Hr IV Continuous <Continuous>  glucagon  Injectable 1 milliGRAM(s) IntraMuscular once  lacosamide IVPB 250 milliGRAM(s) IV Intermittent every 12 hours  lipid, fat emulsion (Fish Oil and Plant Based) 20% Infusion 0.7 Gm/kG/Day IV Continuous <Continuous>  norepinephrine Infusion 0.05 MICROgram(s)/kG/Min IV Continuous <Continuous>  pantoprazole  Injectable 40 milliGRAM(s) IV Push daily  Parenteral Nutrition - Adult 1 Each TPN Continuous <Continuous>  Phoxillum Filtration BK 4 / 2.5 5000 milliLiter(s) CRRT <Continuous>  sodium chloride 3%  Inhalation 4 milliLiter(s) Inhalation every 6 hours    PRN Inpatient Medications  dextrose Oral Gel 15 Gram(s) Oral once PRN  sodium chloride 0.9% lock flush 10 milliLiter(s) IV Push every 1 hour PRN      REVIEW OF SYSTEMS  --------------------------------------------------------------------------------  Gen: No weight changes, fatigue, fevers/chills, weakness  Skin: No rashes  Head/Eyes/Ears/Mouth: No headache; Normal hearing; Normal vision w/o blurriness; No sinus pain/discomfort, sore throat  Respiratory: No dyspnea, cough, wheezing, hemoptysis  CV: No chest pain, PND, orthopnea  GI: No abdominal pain, diarrhea, constipation, nausea, vomiting, melena, hematochezia  : No increased frequency, dysuria, hematuria, nocturia  MSK: No joint pain/swelling; no back pain; no edema  Neuro: No dizziness/lightheadedness, weakness, seizures, numbness, tingling  Heme: No easy bruising or bleeding  Endo: No heat/cold intolerance  Psych: No significant nervousness, anxiety, stress, depression    All other systems were reviewed and are negative, except as noted.    VITALS/PHYSICAL EXAM  --------------------------------------------------------------------------------  T(C): 37.1 (06-06-23 @ 09:00), Max: 37.3 (06-06-23 @ 01:10)  HR: 110 (06-06-23 @ 10:00) (72 - 125)  BP: --  RR: 19 (06-06-23 @ 10:00) (12 - 51)  SpO2: 100% (06-06-23 @ 10:00) (97% - 100%)  Wt(kg): --  Drug Dosing Weight  Height (cm): 182.9 (05 Jun 2023 01:25)  Weight (kg): 70 (05 Jun 2023 01:25)  BMI (kg/m2): 20.9 (05 Jun 2023 01:25)  BSA (m2): 1.91 (05 Jun 2023 01:25)  Height (cm): 182.9 (06-05-23 @ 01:25)  Weight (kg): 70 (06-05-23 @ 01:25)  BMI (kg/m2): 20.9 (06-05-23 @ 01:25)  BSA (m2): 1.91 (06-05-23 @ 01:25)      06-05-23 @ 07:01  -  06-06-23 @ 07:00  --------------------------------------------------------  IN: 3499.8 mL / OUT: 4558 mL / NET: -1058.2 mL    06-06-23 @ 07:01  -  06-06-23 @ 11:01  --------------------------------------------------------  IN: 353.5 mL / OUT: 592 mL / NET: -238.5 mL      Physical Exam:  	Gen: NAD, well-appearing  	HEENT: PERRL, supple neck, clear oropharynx  	Pulm: CTA B/L  	CV: RRR, S1S2; no rub  	Back: No spinal or CVA tenderness; no sacral edema  	Abd: +BS, soft, nontender/nondistended  	: No suprapubic tenderness  	UE: Warm, FROM, no clubbing, intact strength; no edema; no asterixis  	LE: Warm, FROM, no clubbing, intact strength; no edema  	Neuro: No focal deficits, intact gait  	Psych: Normal affect and mood  	Skin: Warm, without rashes  	Vascular access:    LABS/STUDIES  --------------------------------------------------------------------------------              7.6    8.75  >-----------<  83       [06-06-23 @ 04:27]              23.0     133  |  102  |  27  ----------------------------<  120      [06-06-23 @ 04:27]  4.2   |  24  |  0.63        Ca     7.8     [06-06-23 @ 04:27]      Mg     2.0     [06-06-23 @ 04:27]      Phos  3.8     [06-06-23 @ 04:27]    TPro  5.3  /  Alb  2.1  /  TBili  1.5  /  DBili  x   /  AST  18  /  ALT  10  /  AlkPhos  215  [06-06-23 @ 04:27]    PT/INR: PT 16.2 , INR 1.36       [06-06-23 @ 04:27]  PTT: 34.1       [06-06-23 @ 04:27]      TSH 2.650      [05-05-23 @ 07:25]  Lipid: chol --, TG 88, HDL --, LDL --      [06-05-23 @ 05:30]        RADIOLOGY:  -------------------------------------------------------------------------------------- --------------------------------------------------------------------------------  Chief Complaint: ESRD/Ongoing hemodialysis requirement    24 hour events/subjective:    Seen this morning, tolerating CVVHD. Net negative over last 24 hours. Discussed with Dr. Cutler, patient's hemodynamics appear to be improving.  Given that he is no longer on pressors, we can let the CVVHD fluid cartridge finish or let fluids finish and place patient on aquapheresis to allow for volume removal  or similarly, observe to see how much recovery the patient is having since urine output is improving.     PAST HISTORY  --------------------------------------------------------------------------------  No significant changes to PMH, PSH, FHx, SHx, unless otherwise noted    ALLERGIES & MEDICATIONS  --------------------------------------------------------------------------------  Allergies    No Known Allergies    Intolerances      Standing Inpatient Medications  acetylcysteine 20%  Inhalation 4 milliLiter(s) Inhalation every 6 hours  albuterol/ipratropium for Nebulization 3 milliLiter(s) Nebulizer every 6 hours  artificial  tears Solution 1 Drop(s) Both EYES two times a day  cefTRIAXone   IVPB 2000 milliGRAM(s) IV Intermittent every 24 hours  chlorhexidine 0.12% Liquid 15 milliLiter(s) Oral Mucosa every 12 hours  chlorhexidine 2% Cloths 1 Application(s) Topical daily  CRRT Treatment    <Continuous>  dexMEDEtomidine Infusion 1 MICROgram(s)/kG/Hr IV Continuous <Continuous>  dextrose 50% Injectable 25 Gram(s) IV Push once  fentaNYL   Infusion... 0.5 MICROgram(s)/kG/Hr IV Continuous <Continuous>  glucagon  Injectable 1 milliGRAM(s) IntraMuscular once  lacosamide IVPB 250 milliGRAM(s) IV Intermittent every 12 hours  lipid, fat emulsion (Fish Oil and Plant Based) 20% Infusion 0.7 Gm/kG/Day IV Continuous <Continuous>  norepinephrine Infusion 0.05 MICROgram(s)/kG/Min IV Continuous <Continuous>  pantoprazole  Injectable 40 milliGRAM(s) IV Push daily  Parenteral Nutrition - Adult 1 Each TPN Continuous <Continuous>  Phoxillum Filtration BK 4 / 2.5 5000 milliLiter(s) CRRT <Continuous>  sodium chloride 3%  Inhalation 4 milliLiter(s) Inhalation every 6 hours    PRN Inpatient Medications  dextrose Oral Gel 15 Gram(s) Oral once PRN  sodium chloride 0.9% lock flush 10 milliLiter(s) IV Push every 1 hour PRN      REVIEW OF SYSTEMS  --------------------------------------------------------------------------------  All other systems were reviewed and are negative, except as noted.    VITALS/PHYSICAL EXAM  --------------------------------------------------------------------------------  T(C): 37.1 (06-06-23 @ 09:00), Max: 37.3 (06-06-23 @ 01:10)  HR: 110 (06-06-23 @ 10:00) (72 - 125)  BP: --  RR: 19 (06-06-23 @ 10:00) (12 - 51)  SpO2: 100% (06-06-23 @ 10:00) (97% - 100%)  Wt(kg): --  Drug Dosing Weight  Height (cm): 182.9 (05 Jun 2023 01:25)  Weight (kg): 70 (05 Jun 2023 01:25)  BMI (kg/m2): 20.9 (05 Jun 2023 01:25)  BSA (m2): 1.91 (05 Jun 2023 01:25)  Height (cm): 182.9 (06-05-23 @ 01:25)  Weight (kg): 70 (06-05-23 @ 01:25)  BMI (kg/m2): 20.9 (06-05-23 @ 01:25)  BSA (m2): 1.91 (06-05-23 @ 01:25)      06-05-23 @ 07:01  -  06-06-23 @ 07:00  --------------------------------------------------------  IN: 3499.8 mL / OUT: 4558 mL / NET: -1058.2 mL    06-06-23 @ 07:01  -  06-06-23 @ 11:01  --------------------------------------------------------  IN: 353.5 mL / OUT: 592 mL / NET: -238.5 mL      PHYSICAL EXAM:  GENERAL: intubated and sedated  CHEST/LUNG: mechanical breath sounds  HEART: normal S1S2, RRR  ABDOMEN: Soft, Nontender, +BS,   EXTREMITIES: +2 edema BL LE UE   ACCESS: L subclavian HD cath (placed 5/31)    LABS/STUDIES  --------------------------------------------------------------------------------              7.6    8.75  >-----------<  83       [06-06-23 @ 04:27]              23.0     133  |  102  |  27  ----------------------------<  120      [06-06-23 @ 04:27]  4.2   |  24  |  0.63        Ca     7.8     [06-06-23 @ 04:27]      Mg     2.0     [06-06-23 @ 04:27]      Phos  3.8     [06-06-23 @ 04:27]    TPro  5.3  /  Alb  2.1  /  TBili  1.5  /  DBili  x   /  AST  18  /  ALT  10  /  AlkPhos  215  [06-06-23 @ 04:27]    PT/INR: PT 16.2 , INR 1.36       [06-06-23 @ 04:27]  PTT: 34.1       [06-06-23 @ 04:27]      TSH 2.650      [05-05-23 @ 07:25]  Lipid: chol --, TG 88, HDL --, LDL --      [06-05-23 @ 05:30]        RADIOLOGY:  --------------------------------------------------------------------------------------

## 2023-06-06 NOTE — PROGRESS NOTE ADULT - SUBJECTIVE AND OBJECTIVE BOX
PULMONARY CONSULT SERVICE FOLLOW-UP NOTE    INTERVAL HPI:  Reviewed chart and overnight events; patient seen and examined at bedside. S/p bedside percutaneous tracheostomy yesterday. On pressure support this morning, comfortable, no complaints. Responds to basic commands. Not tachypneic on 5/5. vEEG placed yesterday as well. ROS otherwise negative.     MEDICATIONS:  Pulmonary:  acetylcysteine 20%  Inhalation 4 milliLiter(s) Inhalation every 6 hours  albuterol/ipratropium for Nebulization 3 milliLiter(s) Nebulizer every 6 hours  sodium chloride 3%  Inhalation 4 milliLiter(s) Inhalation every 6 hours    Antimicrobials:  cefTRIAXone   IVPB 2000 milliGRAM(s) IV Intermittent every 24 hours    Anticoagulants:    Cardiac:  norepinephrine Infusion 0.05 MICROgram(s)/kG/Min IV Continuous <Continuous>      Allergies    No Known Allergies    Intolerances        Vital Signs Last 24 Hrs  T(C): 37.1 (06 Jun 2023 09:00), Max: 37.3 (06 Jun 2023 01:10)  T(F): 98.8 (06 Jun 2023 09:00), Max: 99.1 (06 Jun 2023 01:10)  HR: 110 (06 Jun 2023 10:00) (69 - 125)  BP: --  BP(mean): --  RR: 19 (06 Jun 2023 10:00) (12 - 51)  SpO2: 100% (06 Jun 2023 10:00) (97% - 100%)    Parameters below as of 06 Jun 2023 10:00  Patient On (Oxygen Delivery Method): ventilator, CPAP        06-05 @ 07:01 - 06-06 @ 07:00  --------------------------------------------------------  IN: 3499.8 mL / OUT: 4558 mL / NET: -1058.2 mL    06-06 @ 07:01  -  06-06 @ 10:54  --------------------------------------------------------  IN: 353.5 mL / OUT: 592 mL / NET: -238.5 mL      Mode: CPAP with PS  FiO2: 40  PEEP: 5  PS: 5  MAP: 6.3  PIP: 11      PHYSICAL EXAM:  Constitutional: ill appearing  HEENT: NC/AT; PERRL, anicteric sclera; MMM  Neck: supple; trach site c/d/i, no evidence of bleeding  Cardiovascular: +S1/S2, tachycardic but RR  Respiratory: CTA B/L; no W/R/R; comfortable on PS 5/5  Gastrointestinal: soft, NT/ND; multiple drains with bilious and serosanguinous output   Extremities: WWP; no clubbing or cyanosis; anasarca  Vascular: 2+ radial pulses B/L  Neurological: awake and alert; GODINEZ, follows basic commands    LABS:  ABG - ( 06 Jun 2023 04:27 )  pH, Arterial: 7.41  pH, Blood: x     /  pCO2: 40    /  pO2: 141   / HCO3: 25    / Base Excess: 0.7   /  SaO2: 99.2                CBC Full  -  ( 06 Jun 2023 04:27 )  WBC Count : 8.75 K/uL  RBC Count : 2.58 M/uL  Hemoglobin : 7.6 g/dL  Hematocrit : 23.0 %  Platelet Count - Automated : 83 K/uL  Mean Cell Volume : 89.1 fl  Mean Cell Hemoglobin : 29.5 pg  Mean Cell Hemoglobin Concentration : 33.0 gm/dL  Auto Neutrophil # : 6.39 K/uL  Auto Lymphocyte # : 0.24 K/uL  Auto Monocyte # : 0.32 K/uL  Auto Eosinophil # : 1.65 K/uL  Auto Basophil # : 0.08 K/uL  Auto Neutrophil % : 71.2 %  Auto Lymphocyte % : 2.7 %  Auto Monocyte % : 3.6 %  Auto Eosinophil % : 18.9 %  Auto Basophil % : 0.9 %    06-06    133<L>  |  102  |  27<H>  ----------------------------<  120<H>  4.2   |  24  |  0.63    Ca    7.8<L>      06 Jun 2023 04:27  Phos  3.8     06-06  Mg     2.0     06-06    TPro  5.3<L>  /  Alb  2.1<L>  /  TBili  1.5<H>  /  DBili  x   /  AST  18  /  ALT  10  /  AlkPhos  215<H>  06-06    PT/INR - ( 06 Jun 2023 04:27 )   PT: 16.2 sec;   INR: 1.36          PTT - ( 06 Jun 2023 04:27 )  PTT:34.1 sec                  RADIOLOGY & ADDITIONAL STUDIES:

## 2023-06-07 LAB
ALBUMIN SERPL ELPH-MCNC: 2.2 G/DL — LOW (ref 3.3–5)
ALP SERPL-CCNC: 178 U/L — HIGH (ref 40–120)
ALT FLD-CCNC: 14 U/L — SIGNIFICANT CHANGE UP (ref 10–45)
ANION GAP SERPL CALC-SCNC: 7 MMOL/L — SIGNIFICANT CHANGE UP (ref 5–17)
ANION GAP SERPL CALC-SCNC: 7 MMOL/L — SIGNIFICANT CHANGE UP (ref 5–17)
APTT BLD: 37 SEC — HIGH (ref 27.5–35.5)
AST SERPL-CCNC: 20 U/L — SIGNIFICANT CHANGE UP (ref 10–40)
BASE EXCESS BLDA CALC-SCNC: 1.3 MMOL/L — SIGNIFICANT CHANGE UP (ref -2–3)
BILIRUB DIRECT SERPL-MCNC: 0.6 MG/DL — HIGH (ref 0–0.3)
BILIRUB INDIRECT FLD-MCNC: 0.5 MG/DL — SIGNIFICANT CHANGE UP (ref 0.2–1)
BILIRUB SERPL-MCNC: 1 MG/DL — SIGNIFICANT CHANGE UP (ref 0.2–1.2)
BUN SERPL-MCNC: 29 MG/DL — HIGH (ref 7–23)
BUN SERPL-MCNC: 36 MG/DL — HIGH (ref 7–23)
CALCIUM SERPL-MCNC: 7.4 MG/DL — LOW (ref 8.4–10.5)
CALCIUM SERPL-MCNC: 8.1 MG/DL — LOW (ref 8.4–10.5)
CHLORIDE SERPL-SCNC: 102 MMOL/L — SIGNIFICANT CHANGE UP (ref 96–108)
CHLORIDE SERPL-SCNC: 106 MMOL/L — SIGNIFICANT CHANGE UP (ref 96–108)
CO2 BLDA-SCNC: 26 MMOL/L — HIGH (ref 19–24)
CO2 SERPL-SCNC: 24 MMOL/L — SIGNIFICANT CHANGE UP (ref 22–31)
CO2 SERPL-SCNC: 25 MMOL/L — SIGNIFICANT CHANGE UP (ref 22–31)
CREAT SERPL-MCNC: 0.64 MG/DL — SIGNIFICANT CHANGE UP (ref 0.5–1.3)
CREAT SERPL-MCNC: 0.88 MG/DL — SIGNIFICANT CHANGE UP (ref 0.5–1.3)
CULTURE RESULTS: SIGNIFICANT CHANGE UP
EGFR: 102 ML/MIN/1.73M2 — SIGNIFICANT CHANGE UP
EGFR: 112 ML/MIN/1.73M2 — SIGNIFICANT CHANGE UP
GAS PNL BLDA: SIGNIFICANT CHANGE UP
GLUCOSE SERPL-MCNC: 135 MG/DL — HIGH (ref 70–99)
GLUCOSE SERPL-MCNC: 98 MG/DL — SIGNIFICANT CHANGE UP (ref 70–99)
GRAM STN FLD: SIGNIFICANT CHANGE UP
HBV SURFACE AB SER-ACNC: REACTIVE
HBV SURFACE AG SER-ACNC: SIGNIFICANT CHANGE UP
HCO3 BLDA-SCNC: 25 MMOL/L — SIGNIFICANT CHANGE UP (ref 21–28)
HCT VFR BLD CALC: 22.4 % — LOW (ref 39–50)
HGB BLD-MCNC: 7.2 G/DL — LOW (ref 13–17)
INR BLD: 1.32 — HIGH (ref 0.88–1.16)
LACTATE SERPL-SCNC: 1.2 MMOL/L — SIGNIFICANT CHANGE UP (ref 0.5–2)
MAGNESIUM SERPL-MCNC: 1.9 MG/DL — SIGNIFICANT CHANGE UP (ref 1.6–2.6)
MAGNESIUM SERPL-MCNC: 2.1 MG/DL — SIGNIFICANT CHANGE UP (ref 1.6–2.6)
MCHC RBC-ENTMCNC: 28.9 PG — SIGNIFICANT CHANGE UP (ref 27–34)
MCHC RBC-ENTMCNC: 32.1 GM/DL — SIGNIFICANT CHANGE UP (ref 32–36)
MCV RBC AUTO: 90 FL — SIGNIFICANT CHANGE UP (ref 80–100)
NRBC # BLD: 0 /100 WBCS — SIGNIFICANT CHANGE UP (ref 0–0)
PCO2 BLDA: 36 MMHG — SIGNIFICANT CHANGE UP (ref 35–48)
PH BLDA: 7.45 — SIGNIFICANT CHANGE UP (ref 7.35–7.45)
PHOSPHATE SERPL-MCNC: 3.2 MG/DL — SIGNIFICANT CHANGE UP (ref 2.5–4.5)
PHOSPHATE SERPL-MCNC: 4.3 MG/DL — SIGNIFICANT CHANGE UP (ref 2.5–4.5)
PLATELET # BLD AUTO: 81 K/UL — LOW (ref 150–400)
PO2 BLDA: 165 MMHG — HIGH (ref 83–108)
POTASSIUM SERPL-MCNC: 3.6 MMOL/L — SIGNIFICANT CHANGE UP (ref 3.5–5.3)
POTASSIUM SERPL-MCNC: 3.9 MMOL/L — SIGNIFICANT CHANGE UP (ref 3.5–5.3)
POTASSIUM SERPL-SCNC: 3.6 MMOL/L — SIGNIFICANT CHANGE UP (ref 3.5–5.3)
POTASSIUM SERPL-SCNC: 3.9 MMOL/L — SIGNIFICANT CHANGE UP (ref 3.5–5.3)
PREALB SERPL-MCNC: 8 MG/DL — LOW (ref 20–40)
PROT SERPL-MCNC: 5.5 G/DL — LOW (ref 6–8.3)
PROTHROM AB SERPL-ACNC: 15.8 SEC — HIGH (ref 10.5–13.4)
RBC # BLD: 2.49 M/UL — LOW (ref 4.2–5.8)
RBC # FLD: 16.6 % — HIGH (ref 10.3–14.5)
SAO2 % BLDA: 99.3 % — HIGH (ref 94–98)
SODIUM SERPL-SCNC: 133 MMOL/L — LOW (ref 135–145)
SODIUM SERPL-SCNC: 138 MMOL/L — SIGNIFICANT CHANGE UP (ref 135–145)
SPECIMEN SOURCE: SIGNIFICANT CHANGE UP
WBC # BLD: 8.59 K/UL — SIGNIFICANT CHANGE UP (ref 3.8–10.5)
WBC # FLD AUTO: 8.59 K/UL — SIGNIFICANT CHANGE UP (ref 3.8–10.5)

## 2023-06-07 PROCEDURE — 90935 HEMODIALYSIS ONE EVALUATION: CPT

## 2023-06-07 PROCEDURE — 71045 X-RAY EXAM CHEST 1 VIEW: CPT | Mod: 26

## 2023-06-07 PROCEDURE — 99233 SBSQ HOSP IP/OBS HIGH 50: CPT | Mod: GC

## 2023-06-07 PROCEDURE — 74176 CT ABD & PELVIS W/O CONTRAST: CPT | Mod: 26

## 2023-06-07 PROCEDURE — 71250 CT THORAX DX C-: CPT | Mod: 26

## 2023-06-07 PROCEDURE — 99291 CRITICAL CARE FIRST HOUR: CPT

## 2023-06-07 PROCEDURE — 95720 EEG PHY/QHP EA INCR W/VEEG: CPT

## 2023-06-07 RX ORDER — ACETAMINOPHEN 500 MG
1000 TABLET ORAL ONCE
Refills: 0 | Status: DISCONTINUED | OUTPATIENT
Start: 2023-06-07 | End: 2023-06-07

## 2023-06-07 RX ORDER — ELECTROLYTE SOLUTION,INJ
1 VIAL (ML) INTRAVENOUS
Refills: 0 | Status: DISCONTINUED | OUTPATIENT
Start: 2023-06-07 | End: 2023-06-07

## 2023-06-07 RX ORDER — HYDROMORPHONE HYDROCHLORIDE 2 MG/ML
2 INJECTION INTRAMUSCULAR; INTRAVENOUS; SUBCUTANEOUS ONCE
Refills: 0 | Status: DISCONTINUED | OUTPATIENT
Start: 2023-06-07 | End: 2023-06-07

## 2023-06-07 RX ORDER — KETAMINE HYDROCHLORIDE 100 MG/ML
0.1 INJECTION INTRAMUSCULAR; INTRAVENOUS
Qty: 200 | Refills: 0 | Status: DISCONTINUED | OUTPATIENT
Start: 2023-06-07 | End: 2023-06-08

## 2023-06-07 RX ORDER — KETAMINE HYDROCHLORIDE 100 MG/ML
21 INJECTION INTRAMUSCULAR; INTRAVENOUS ONCE
Refills: 0 | Status: DISCONTINUED | OUTPATIENT
Start: 2023-06-07 | End: 2023-06-07

## 2023-06-07 RX ORDER — NOREPINEPHRINE BITARTRATE/D5W 8 MG/250ML
0.05 PLASTIC BAG, INJECTION (ML) INTRAVENOUS
Qty: 8 | Refills: 0 | Status: DISCONTINUED | OUTPATIENT
Start: 2023-06-07 | End: 2023-06-10

## 2023-06-07 RX ORDER — DIATRIZOATE MEGLUMINE 180 MG/ML
30 INJECTION, SOLUTION INTRAVESICAL ONCE
Refills: 0 | Status: COMPLETED | OUTPATIENT
Start: 2023-06-07 | End: 2023-06-07

## 2023-06-07 RX ORDER — ACETAMINOPHEN 500 MG
1000 TABLET ORAL ONCE
Refills: 0 | Status: COMPLETED | OUTPATIENT
Start: 2023-06-07 | End: 2023-06-07

## 2023-06-07 RX ORDER — ACETAMINOPHEN 500 MG
975 TABLET ORAL EVERY 6 HOURS
Refills: 0 | Status: DISCONTINUED | OUTPATIENT
Start: 2023-06-07 | End: 2023-06-09

## 2023-06-07 RX ORDER — I.V. FAT EMULSION 20 G/100ML
0.7 EMULSION INTRAVENOUS
Qty: 50 | Refills: 0 | Status: DISCONTINUED | OUTPATIENT
Start: 2023-06-07 | End: 2023-06-07

## 2023-06-07 RX ORDER — ACETAMINOPHEN 500 MG
975 TABLET ORAL EVERY 6 HOURS
Refills: 0 | Status: DISCONTINUED | OUTPATIENT
Start: 2023-06-07 | End: 2023-06-07

## 2023-06-07 RX ADMIN — CEFTRIAXONE 100 MILLIGRAM(S): 500 INJECTION, POWDER, FOR SOLUTION INTRAMUSCULAR; INTRAVENOUS at 16:31

## 2023-06-07 RX ADMIN — SODIUM CHLORIDE 4 MILLILITER(S): 9 INJECTION INTRAMUSCULAR; INTRAVENOUS; SUBCUTANEOUS at 22:02

## 2023-06-07 RX ADMIN — LACOSAMIDE 150 MILLIGRAM(S): 50 TABLET ORAL at 19:50

## 2023-06-07 RX ADMIN — SODIUM CHLORIDE 4 MILLILITER(S): 9 INJECTION INTRAMUSCULAR; INTRAVENOUS; SUBCUTANEOUS at 04:44

## 2023-06-07 RX ADMIN — I.V. FAT EMULSION 20.83 GM/KG/DAY: 20 EMULSION INTRAVENOUS at 19:51

## 2023-06-07 RX ADMIN — DIATRIZOATE MEGLUMINE 30 MILLILITER(S): 180 INJECTION, SOLUTION INTRAVESICAL at 13:26

## 2023-06-07 RX ADMIN — Medication 400 MILLIGRAM(S): at 05:21

## 2023-06-07 RX ADMIN — Medication 3 MILLILITER(S): at 04:44

## 2023-06-07 RX ADMIN — Medication 1 DROP(S): at 05:22

## 2023-06-07 RX ADMIN — Medication 975 MILLIGRAM(S): at 21:57

## 2023-06-07 RX ADMIN — DEXMEDETOMIDINE HYDROCHLORIDE IN 0.9% SODIUM CHLORIDE 17.5 MICROGRAM(S)/KG/HR: 4 INJECTION INTRAVENOUS at 02:01

## 2023-06-07 RX ADMIN — Medication 3 MILLILITER(S): at 22:03

## 2023-06-07 RX ADMIN — Medication 4 MILLILITER(S): at 04:44

## 2023-06-07 RX ADMIN — KETAMINE HYDROCHLORIDE 3.5 MG/KG/HR: 100 INJECTION INTRAMUSCULAR; INTRAVENOUS at 17:50

## 2023-06-07 RX ADMIN — CHLORHEXIDINE GLUCONATE 15 MILLILITER(S): 213 SOLUTION TOPICAL at 05:21

## 2023-06-07 RX ADMIN — PANTOPRAZOLE SODIUM 40 MILLIGRAM(S): 20 TABLET, DELAYED RELEASE ORAL at 13:24

## 2023-06-07 RX ADMIN — DEXMEDETOMIDINE HYDROCHLORIDE IN 0.9% SODIUM CHLORIDE 17.5 MICROGRAM(S)/KG/HR: 4 INJECTION INTRAVENOUS at 19:51

## 2023-06-07 RX ADMIN — CHLORHEXIDINE GLUCONATE 1 APPLICATION(S): 213 SOLUTION TOPICAL at 13:26

## 2023-06-07 RX ADMIN — Medication 1 DROP(S): at 18:06

## 2023-06-07 RX ADMIN — Medication 4 MILLILITER(S): at 16:10

## 2023-06-07 RX ADMIN — SODIUM CHLORIDE 4 MILLILITER(S): 9 INJECTION INTRAMUSCULAR; INTRAVENOUS; SUBCUTANEOUS at 10:15

## 2023-06-07 RX ADMIN — DEXMEDETOMIDINE HYDROCHLORIDE IN 0.9% SODIUM CHLORIDE 17.5 MICROGRAM(S)/KG/HR: 4 INJECTION INTRAVENOUS at 06:15

## 2023-06-07 RX ADMIN — Medication 4 MILLILITER(S): at 22:03

## 2023-06-07 RX ADMIN — Medication 4 MILLILITER(S): at 10:14

## 2023-06-07 RX ADMIN — HYDROMORPHONE HYDROCHLORIDE 2 MILLIGRAM(S): 2 INJECTION INTRAMUSCULAR; INTRAVENOUS; SUBCUTANEOUS at 15:00

## 2023-06-07 RX ADMIN — DEXMEDETOMIDINE HYDROCHLORIDE IN 0.9% SODIUM CHLORIDE 17.5 MICROGRAM(S)/KG/HR: 4 INJECTION INTRAVENOUS at 14:11

## 2023-06-07 RX ADMIN — Medication 1 EACH: at 19:50

## 2023-06-07 RX ADMIN — Medication 3 MILLILITER(S): at 10:13

## 2023-06-07 RX ADMIN — CHLORHEXIDINE GLUCONATE 15 MILLILITER(S): 213 SOLUTION TOPICAL at 18:05

## 2023-06-07 RX ADMIN — Medication 3 MILLILITER(S): at 16:10

## 2023-06-07 RX ADMIN — LACOSAMIDE 150 MILLIGRAM(S): 50 TABLET ORAL at 06:14

## 2023-06-07 RX ADMIN — KETAMINE HYDROCHLORIDE 400 MILLIGRAM(S): 100 INJECTION INTRAMUSCULAR; INTRAVENOUS at 16:47

## 2023-06-07 RX ADMIN — SODIUM CHLORIDE 4 MILLILITER(S): 9 INJECTION INTRAMUSCULAR; INTRAVENOUS; SUBCUTANEOUS at 16:11

## 2023-06-07 RX ADMIN — Medication 400 MILLIGRAM(S): at 15:00

## 2023-06-07 RX ADMIN — HYDROMORPHONE HYDROCHLORIDE 2 MILLIGRAM(S): 2 INJECTION INTRAMUSCULAR; INTRAVENOUS; SUBCUTANEOUS at 10:46

## 2023-06-07 RX ADMIN — HYDROMORPHONE HYDROCHLORIDE 2 MILLIGRAM(S): 2 INJECTION INTRAMUSCULAR; INTRAVENOUS; SUBCUTANEOUS at 16:00

## 2023-06-07 RX ADMIN — HYDROMORPHONE HYDROCHLORIDE 2 MILLIGRAM(S): 2 INJECTION INTRAMUSCULAR; INTRAVENOUS; SUBCUTANEOUS at 11:15

## 2023-06-07 NOTE — CHART NOTE - NSCHARTNOTEFT_GEN_A_CORE
Admitting Diagnosis:   Patient is a 53y old  Male who presents with a chief complaint of endoleak, abdominal pain (2023 13:58)      PAST MEDICAL & SURGICAL HISTORY:  HTN (hypertension)      Aortic dissection      CAD (coronary artery disease)      Seizure disorder      Seizure disorder      Hypertension      Status post endovascular aneurysm repair (EVAR)      S/P aortic bifurcation bypass graft      S/P CABG x 1          Current Nutrition Order:  TPN via central line: 368g Dex, 105g amino acids, 50g SMOF lipids to provide 2171.2kcal, 105g protein, GIR of 3.65, 1.5gprotein/kg ABW. 1.68L @ 70mL/hr.   EN: Jevity 1.2 Damián @ 20ml/hr x 24hrs via PEJ.     PO Intake: Good (%) [   ]  Fair (50-75%) [   ] Poor (<25%) [   ]- NA     GI Issues: Unable to assess at this time 2/2 vent     Pain: Unable to assess at this time 2/2 vent; sedated, non-verbal indicators of pain absent     Skin Integrity: Sandro 12, B/L hands 2+ edema, B/L legs 3+ edema  CODIE x 4  Coccyx stage II, clavicle stage II, scapula stage I    Labs:       133<L>  |  102  |  36<H>  ----------------------------<  135<H>  3.9   |  24  |  0.88    Ca    8.1<L>      2023 05:12  Phos  4.3     -  Mg     2.1     -    TPro  5.5<L>  /  Alb  2.2<L>  /  TBili  1.0  /  DBili  0.6<H>  /  AST  20  /  ALT  14  /  AlkPhos  178<H>      CAPILLARY BLOOD GLUCOSE      POCT Blood Glucose.: 100 mg/dL (2023 23:33)      Medications:  MEDICATIONS  (STANDING):  acetaminophen   IVPB .. 1000 milliGRAM(s) IV Intermittent once  acetylcysteine 20%  Inhalation 4 milliLiter(s) Inhalation every 6 hours  albuterol/ipratropium for Nebulization 3 milliLiter(s) Nebulizer every 6 hours  artificial  tears Solution 1 Drop(s) Both EYES two times a day  cefTRIAXone   IVPB 2000 milliGRAM(s) IV Intermittent every 24 hours  chlorhexidine 0.12% Liquid 15 milliLiter(s) Oral Mucosa every 12 hours  chlorhexidine 2% Cloths 1 Application(s) Topical daily  dexMEDEtomidine Infusion 1 MICROgram(s)/kG/Hr (17.5 mL/Hr) IV Continuous <Continuous>  dextrose 50% Injectable 25 Gram(s) IV Push once  fentaNYL   Infusion... 0.5 MICROgram(s)/kG/Hr (1.75 mL/Hr) IV Continuous <Continuous>  glucagon  Injectable 1 milliGRAM(s) IntraMuscular once  lacosamide IVPB 250 milliGRAM(s) IV Intermittent every 12 hours  lipid, fat emulsion (Fish Oil and Plant Based) 20% Infusion 0.7 Gm/kG/Day (20.83 mL/Hr) IV Continuous <Continuous>  pantoprazole  Injectable 40 milliGRAM(s) IV Push daily  Parenteral Nutrition - Adult 1 Each (70 mL/Hr) TPN Continuous <Continuous>  Parenteral Nutrition - Adult 1 Each (70 mL/Hr) TPN Continuous <Continuous>  sodium chloride 3%  Inhalation 4 milliLiter(s) Inhalation every 6 hours    MEDICATIONS  (PRN):  dextrose Oral Gel 15 Gram(s) Oral once PRN Blood Glucose LESS THAN 70 milliGRAM(s)/deciliter  sodium chloride 0.9% lock flush 10 milliLiter(s) IV Push every 1 hour PRN Pre/post blood products, medications, blood draw, and to maintain line patency      Weight:  Daily     Daily Weight in k.9 (2023 14:00)    Weight:  6'0''  pounds+-10%   Wt 154 pounds BMI 20.9 %IBW87    Weight Change: Based on most recent EMR wt; unreliable 2/2 edema/anasarca    Estimated energy needs:   current body wt used for energy calculations as pt falls within % IBW  adjust for age, post op needs, pancreatitis, pressure ulcer, ICU level of care, Vent, HD   25-30kcal/k-2094kcal/day   1.5-2.0gm/k-140gm prot/day   Fluids per team     Subjective: 53yMale w/ PMHx of HTN, type A aortic dissection s/p Dacron grafts, AV resuspension in , CAD s/p CABG x 1 SVG to RCA (2013 with Dr. Medina), seizure disorder, recent admission 3/20- for replacement of transverse aortic arch, second stage TEVAR and aorto-axillary bypass, AV replacement (bio 23mm), and CABG x 1 (SVG-RCA). Pt found to have endo leak and taken to OR for TEVAR revision on , Post op course c/b Klebsiella bacteremia (5/15), resp failure requiring intubation on , Mucus plugging s/p Bronch  and PEA arrest. Post operatively pt also found to have hemorrhagic pancreatitis with venu-pancreatic abscess possibly 2* to Depakote therefore s/p IR aspiration of peripancreatic fluid collection and paracentesis on , IR venu-pancreatic abscess drainage and placement of drainage catheter on . Pt then transferred from CTICU to SICU for further mgmt of hemorrhagic pancreatitis on . s/p IR placement of 2 new drainage catheters and catheter exchange on . LUQ drainage . OR  exlap washout & replacement of 3 new JPs and abthera vac with open abdomen. RTOR , no bleeding, evac of old blood, placement of feeding J-tube. Failed extubation 6/3: s/p Trach .    Pt seen in room, trached to vent on VC/AC mode. Sedated on fentanyl and precedex. MAP 85- not currently requiring pressors. iHD running. vEEG monitoring ongoing. Currently with TPN @ 70ml/hr via central line. Started on trickle feeds of Jevity 1.2 Damián @ 20ml/hr via PEJ, but currently held for CT C/A/P. Has sump to LIWS as well. Tmax 100.4F today. Na 133 (L). POC . TG 88 (). Will continue to follow per RD protocol.     Previous Nutrition Diagnosis: Increased nutrients RT increased demands AEB Vent     Active [ x ]  Resolved [   ]    Goal: Pt to meet at least 75% of nutritional needs consistently via most feasible route    Recommendations:  1. Continue TPN of 368g Dex, 105g amino acids, 50g lipid emulsion to provide 2171.2kcal, 105g protein, GIR of 3.65, 1.5gprotein/kg ABW  2. Continue with trickle feeds of Jevity 1.2 Damián. Advance by 10ml daily as tolerated. Once able to tolerate >50% of needs via EN, initiate TPN wean.   *GOAL EN ONCE OFF TPN: Vital 1.5 Damián @ 50ml/hr x 24hrs plus 2 LPS (200kcal, 30g pro) via PEJ. Provides: 1200ml TV, 2000kcal, 111g pro, 1.59g/kg ABW protein. Monitor for s/s intolerance; maintain aspiration precautions at all times   3. Monitor lytes and replete prn. POC BG q6hrs. TG weekly (next check )  4. Pain and bowel regimens per team   *DW SICU team     Education: N/A; deferred at this time    Risk Level: High [  x ] Moderate [   ] Low [   ].

## 2023-06-07 NOTE — PROGRESS NOTE ADULT - ATTENDING COMMENTS
History of epilepsy, complicated hospital course.  Sedation now weaned  On EEG to assess for seizure activity and need for additional AED  So far has remained seizure free  Continue LCM 250mg BID   Continue EEG

## 2023-06-07 NOTE — EEG REPORT - NS EEG TEXT BOX
[facility] Department of Neurology  Inpatient Continuous video-Electroencephalogram    Patient Name:	VINCENT MARIE    :	1970  MRN:	[id number]    Study Start Date/Time:  2023, 10:12:20 AM  Study End Date/Time:    Referred by: Dary Flores MD    Brief Clinical History:  VINCENT MARIE is a 53 year old Male; study performed to investigate for seizures or markers of epilepsy.    Diagnosis Code:   R56.9 convulsions/seizure  The live video was: unmonitored.    Pertinent Medications:  n/a    Acquisition Details:  Electroencephalography was acquired using a minimum of 21 channels on an [employer] system v 9.3.1 with electrode placement according to the standard International 10-20 system following ACNS (American Clinical Neurophysiology Society) guidelines for Long-Term Video EEG monitoring.  Anterior temporal T1 and T2 electrodes were utilized whenever possible.   The [employer] automated spike & seizure detections were all reviewed in detail, in addition to extensive portions of raw EEG.      Day 1: 2023 @ 10:12:20 AM to next morning @ 07:00 am  Background:  continuous, with predominantly theta>delta frequencies.  Symmetry:  No persistent asymmetries of voltage or frequency.  Posterior Dominant Rhythm:  7 Hz rudimentary.  Organization: Absent.  Voltage:  Normal (20+ uV)  Variability: Yes. 		Reactivity: Yes.  N2 sleep: Absent.  Spontaneous Activity:  No epileptiform discharges.  Occasional brief frontally predominant blunted waves in a triphasic morphology  Periodic/rhythmic activity:  None  Events:  No electrographic seizures or significant clinical events.  Provocations:  Hyperventilation and Photic stimulation: was not performed.    Daily Summary:    Continuous Moderate generalized   slowing suggestive of a Moderate degree of diffuse or multifocal  dysfunction.    There were no epileptiform abnormalities or  electrographic seizures       Dary Flores MD  Attending Neurologist, [facility]ile[facility]

## 2023-06-07 NOTE — PROGRESS NOTE ADULT - ASSESSMENT
53 year-old male with PMHx HTN, type A aortic dissection s/p Dacron grafts, AV resuspension in 2013, CAD s/p CABG x 1 SVG to RCA (5/2013 with Dr. Medina), seizure disorder, recent admission 3/20-4/14 for replacement of transverse aortic arch, second stage TEVAR 5/5 with course c/b peripancreatic/RP hemorrhage/hematoma formation s/p multiple washouts on antibiotics. Epilepsy team initially consulted for history and epilepsy and suspected seizure on 5/12 thought to be provoked. Additional event on 5/14/23 that did not correlate with findings on EEG recordings. Vimpat was increased and he was tapered off Depakote previously due to concerns for possible drug-related hemorrhagic pancreatitis, however, seems less likely and currently pancreatitis is thought to be secondary to gallstones. CTH from 5/18 without acute pathology.     Recommendations:  - Continue Vimpat 250mg BID   - cw vEEG to detect for possible epileptiform activity  - Could continue to hold depakote, especially given thrombocytopenia  - Ativan 2mg IVP for seizures > 2mins  - Keep Mg>2 and K >4.   - Rest of management per primary team       53 year-old male with PMHx HTN, type A aortic dissection s/p Dacron grafts, AV resuspension in 2013, CAD s/p CABG x 1 SVG to RCA (5/2013 with Dr. Medina), seizure disorder, recent admission 3/20-4/14 for replacement of transverse aortic arch, second stage TEVAR 5/5 with course c/b peripancreatic/RP hemorrhage/hematoma formation s/p multiple washouts on antibiotics. Epilepsy team initially consulted for history and epilepsy and suspected seizure on 5/12 thought to be provoked. Additional event on 5/14/23 that did not correlate with findings on EEG recordings. Vimpat was increased and he was tapered off Depakote previously due to concerns for possible drug-related hemorrhagic pancreatitis, however, seems less likely and currently pancreatitis is thought to be secondary to gallstones. CTH from 5/18 without acute pathology.     Recommendations:  - Continue Vimpat 250mg BID   - Continue vEEG to assess for possible seizures/epileptiform activity  - Could continue to hold depakote, especially given thrombocytopenia  - Ativan 2mg IVP for seizures > 2mins  - Keep Mg>2 and K >4.   - Rest of management per primary team

## 2023-06-07 NOTE — PROGRESS NOTE ADULT - SUBJECTIVE AND OBJECTIVE BOX
INTERVAL/OVERNIGHT EVENTS: Febrile overnight to 100.6    SUBJECTIVE:     POD #  SICU Day #    Neurologic Medications  dexMEDEtomidine Infusion 1 MICROgram(s)/kG/Hr IV Continuous <Continuous>  fentaNYL   Infusion... 0.5 MICROgram(s)/kG/Hr IV Continuous <Continuous>  lacosamide IVPB 250 milliGRAM(s) IV Intermittent every 12 hours    Respiratory Medications  acetylcysteine 20%  Inhalation 4 milliLiter(s) Inhalation every 6 hours  albuterol/ipratropium for Nebulization 3 milliLiter(s) Nebulizer every 6 hours  sodium chloride 3%  Inhalation 4 milliLiter(s) Inhalation every 6 hours    Cardiovascular Medications    Gastrointestinal Medications  lipid, fat emulsion (Fish Oil and Plant Based) 20% Infusion 0.7 Gm/kG/Day IV Continuous <Continuous>  pantoprazole  Injectable 40 milliGRAM(s) IV Push daily  Parenteral Nutrition - Adult 1 Each TPN Continuous <Continuous>  Parenteral Nutrition - Adult 1 Each TPN Continuous <Continuous>  sodium chloride 0.9% lock flush 10 milliLiter(s) IV Push every 1 hour PRN Pre/post blood products, medications, blood draw, and to maintain line patency    Genitourinary Medications    Hematologic/Oncologic Medications    Antimicrobial/Immunologic Medications  cefTRIAXone   IVPB 2000 milliGRAM(s) IV Intermittent every 24 hours    Endocrine/Metabolic Medications  dextrose 50% Injectable 25 Gram(s) IV Push once  dextrose Oral Gel 15 Gram(s) Oral once PRN Blood Glucose LESS THAN 70 milliGRAM(s)/deciliter  glucagon  Injectable 1 milliGRAM(s) IntraMuscular once    Topical/Other Medications  artificial  tears Solution 1 Drop(s) Both EYES two times a day  chlorhexidine 0.12% Liquid 15 milliLiter(s) Oral Mucosa every 12 hours  chlorhexidine 2% Cloths 1 Application(s) Topical daily      MEDICATIONS  (PRN):  dextrose Oral Gel 15 Gram(s) Oral once PRN Blood Glucose LESS THAN 70 milliGRAM(s)/deciliter  sodium chloride 0.9% lock flush 10 milliLiter(s) IV Push every 1 hour PRN Pre/post blood products, medications, blood draw, and to maintain line patency      I&O's Detail    06 Jun 2023 07:01  -  07 Jun 2023 07:00  --------------------------------------------------------  IN:    Dexmedetomidine: 346 mL    Enteral Tube Flush: 10 mL    Fat Emulsion (Fish Oil &amp; Plant Based) 20% Infusion: 270.4 mL    FentaNYL: 14 mL    FentaNYL: 101.3 mL    IV PiggyBack: 200 mL    IV PiggyBack: 50 mL    Nepro with Carb Steady: 480 mL    TPN (Total Parenteral Nutrition): 1680 mL  Total IN: 3151.7 mL    OUT:    Bulb (mL): 58 mL    Bulb (mL): 385 mL    Bulb (mL): 160 mL    Drain (mL): 105 mL    Drain (mL): 230 mL    Incontinent per Condom Catheter (mL): 865 mL    Nasogastric/Oral tube (mL): 300 mL    Norepinephrine: 0 mL    Other (mL): 667 mL    Other (mL): 1081 mL  Total OUT: 3851 mL    Total NET: -699.3 mL      07 Jun 2023 07:01  -  07 Jun 2023 14:00  --------------------------------------------------------  IN:    Dexmedetomidine: 105 mL    Enteral Tube Flush: 120 mL    FentaNYL: 38.5 mL    Nepro with Carb Steady: 120 mL    TPN (Total Parenteral Nutrition): 490 mL  Total IN: 873.5 mL    OUT:    Incontinent per Condom Catheter (mL): 75 mL    Other (mL): 252 mL  Total OUT: 327 mL    Total NET: 546.5 mL          Vital Signs Last 24 Hrs  T(C): 38 (07 Jun 2023 09:00), Max: 38.7 (06 Jun 2023 18:06)  T(F): 100.4 (07 Jun 2023 09:00), Max: 101.7 (06 Jun 2023 18:06)  HR: 137 (07 Jun 2023 13:00) (90 - 137)  BP: --  BP(mean): --  RR: 18 (07 Jun 2023 12:00) (13 - 55)  SpO2: 95% (07 Jun 2023 13:00) (95% - 100%)    Parameters below as of 07 Jun 2023 13:00  Patient On (Oxygen Delivery Method): ventilator    O2 Concentration (%): 40    GENERAL: NAD, resting comfortably in bed  HEENT: NCAT, MMM  C/V: Normal rate, normal peripheral perfusion  PULM: Nonlabored breathing, no respiratory distress,   ABD: Soft, ND, NT, no rebound tenderness, no guarding  EXTREM: WWP, no edema, SCDs in place  NEURO: No focal deficits    LABS:                        7.2    8.59  )-----------( 81       ( 07 Jun 2023 05:12 )             22.4     06-07    133<L>  |  102  |  36<H>  ----------------------------<  135<H>  3.9   |  24  |  0.88    Ca    8.1<L>      07 Jun 2023 05:12  Phos  4.3     06-07  Mg     2.1     06-07    TPro  5.5<L>  /  Alb  2.2<L>  /  TBili  1.0  /  DBili  0.6<H>  /  AST  20  /  ALT  14  /  AlkPhos  178<H>  06-07    PT/INR - ( 07 Jun 2023 05:12 )   PT: 15.8 sec;   INR: 1.32          PTT - ( 07 Jun 2023 05:12 )  PTT:37.0 sec      RADIOLOGY & ADDITIONAL STUDIES:      Culture - Blood (collected 06-06-23 @ 21:00)  Source: .Blood Blood-Peripheral  Preliminary Report (06-07-23 @ 10:01):    No growth at 12 hours    Culture - Blood (collected 06-06-23 @ 21:00)  Source: .Blood Blood-Peripheral  Preliminary Report (06-07-23 @ 10:00):    No growth at 12 hours     INTERVAL/OVERNIGHT EVENTS: Febrile overnight to 100.6.    SUBJECTIVE: Patient evaluated at bedside. Arousable, follows commands. Trached, appears uncomfortable on pressure support trial.    SICU Day # 14    Neurologic Medications  dexMEDEtomidine Infusion 1 MICROgram(s)/kG/Hr IV Continuous <Continuous>  fentaNYL   Infusion... 0.5 MICROgram(s)/kG/Hr IV Continuous <Continuous>  lacosamide IVPB 250 milliGRAM(s) IV Intermittent every 12 hours    Respiratory Medications  acetylcysteine 20%  Inhalation 4 milliLiter(s) Inhalation every 6 hours  albuterol/ipratropium for Nebulization 3 milliLiter(s) Nebulizer every 6 hours  sodium chloride 3%  Inhalation 4 milliLiter(s) Inhalation every 6 hours    Cardiovascular Medications    Gastrointestinal Medications  lipid, fat emulsion (Fish Oil and Plant Based) 20% Infusion 0.7 Gm/kG/Day IV Continuous <Continuous>  pantoprazole  Injectable 40 milliGRAM(s) IV Push daily  Parenteral Nutrition - Adult 1 Each TPN Continuous <Continuous>  Parenteral Nutrition - Adult 1 Each TPN Continuous <Continuous>  sodium chloride 0.9% lock flush 10 milliLiter(s) IV Push every 1 hour PRN Pre/post blood products, medications, blood draw, and to maintain line patency    Genitourinary Medications    Hematologic/Oncologic Medications    Antimicrobial/Immunologic Medications  cefTRIAXone   IVPB 2000 milliGRAM(s) IV Intermittent every 24 hours    Endocrine/Metabolic Medications  dextrose 50% Injectable 25 Gram(s) IV Push once  dextrose Oral Gel 15 Gram(s) Oral once PRN Blood Glucose LESS THAN 70 milliGRAM(s)/deciliter  glucagon  Injectable 1 milliGRAM(s) IntraMuscular once    Topical/Other Medications  artificial  tears Solution 1 Drop(s) Both EYES two times a day  chlorhexidine 0.12% Liquid 15 milliLiter(s) Oral Mucosa every 12 hours  chlorhexidine 2% Cloths 1 Application(s) Topical daily      MEDICATIONS  (PRN):  dextrose Oral Gel 15 Gram(s) Oral once PRN Blood Glucose LESS THAN 70 milliGRAM(s)/deciliter  sodium chloride 0.9% lock flush 10 milliLiter(s) IV Push every 1 hour PRN Pre/post blood products, medications, blood draw, and to maintain line patency      I&O's Detail    06 Jun 2023 07:01  -  07 Jun 2023 07:00  --------------------------------------------------------  IN:    Dexmedetomidine: 346 mL    Enteral Tube Flush: 10 mL    Fat Emulsion (Fish Oil &amp; Plant Based) 20% Infusion: 270.4 mL    FentaNYL: 14 mL    FentaNYL: 101.3 mL    IV PiggyBack: 200 mL    IV PiggyBack: 50 mL    Nepro with Carb Steady: 480 mL    TPN (Total Parenteral Nutrition): 1680 mL  Total IN: 3151.7 mL    OUT:    Bulb (mL): 58 mL    Bulb (mL): 385 mL    Bulb (mL): 160 mL    Drain (mL): 105 mL    Drain (mL): 230 mL    Incontinent per Condom Catheter (mL): 865 mL    Nasogastric/Oral tube (mL): 300 mL    Norepinephrine: 0 mL    Other (mL): 667 mL    Other (mL): 1081 mL  Total OUT: 3851 mL    Total NET: -699.3 mL      07 Jun 2023 07:01  -  07 Jun 2023 14:00  --------------------------------------------------------  IN:    Dexmedetomidine: 105 mL    Enteral Tube Flush: 120 mL    FentaNYL: 38.5 mL    Nepro with Carb Steady: 120 mL    TPN (Total Parenteral Nutrition): 490 mL  Total IN: 873.5 mL    OUT:    Incontinent per Condom Catheter (mL): 75 mL    Other (mL): 252 mL  Total OUT: 327 mL    Total NET: 546.5 mL          Vital Signs Last 24 Hrs  T(C): 38 (07 Jun 2023 09:00), Max: 38.7 (06 Jun 2023 18:06)  T(F): 100.4 (07 Jun 2023 09:00), Max: 101.7 (06 Jun 2023 18:06)  HR: 137 (07 Jun 2023 13:00) (90 - 137)  BP: 158/88 (07 Jun 2023 13:00)  BP(mean): 115 (07 Jun 2023 13:00)  RR: 18 (07 Jun 2023 12:00) (13 - 55)  SpO2: 95% (07 Jun 2023 13:00) (95% - 100%)    Parameters below as of 07 Jun 2023 13:00  Patient On (Oxygen Delivery Method): ventilator    O2 Concentration (%): 40    GENERAL: Arousable. Appears uncomfortable on pressure support but no acute distress.  HEENT: Normocephalic. Extraocular muscles are intact. PERRLA. Mucous membranes moist. Trachea midline. Trach 8.5. Facial twitch noted, observed to right upper lip.   PULM: Bilateral rhonchi auscultated. Respirations labored, tachypnea noted on pressure support. Moist, productive cough noted. Clear/blood tinged sputum.   C/V: Tachycardic, normal capillary refill. S1, S2 present. Systolic murmur appreciated.    ABD: Soft, slight distension, tender to palpation. Bowel sounds present, hypoactive all four quadrants. Patent J tube, no drainage. NG tube to LIWS with green bilious drainage. RLQ CODIE to self suction. LLQ CODIE x 2 self suction. Left IR drain to gravity. All drainage sanguinous except hematoma drain noted to be increasingly bilious this morning.   EXTREM: Warm, well perfused. Ansarca/generalized edema +4 noted. Positive peripheral pulses, radial and pedal +3. SCDs.  SKIN: Midline abdominal incision with staples, clean dry and intact. Left knee abrasion.  NEURO: Arousable. RASS -1. Patient following basic commands, oriented to self.    LABS:                        7.2    8.59  )-----------( 81       ( 07 Jun 2023 05:12 )             22.4     06-07    133<L>  |  102  |  36<H>  ----------------------------<  135<H>  3.9   |  24  |  0.88    Ca    8.1<L>      07 Jun 2023 05:12  Phos  4.3     06-07  Mg     2.1     06-07    TPro  5.5<L>  /  Alb  2.2<L>  /  TBili  1.0  /  DBili  0.6<H>  /  AST  20  /  ALT  14  /  AlkPhos  178<H>  06-07    PT/INR - ( 07 Jun 2023 05:12 )   PT: 15.8 sec;   INR: 1.32          PTT - ( 07 Jun 2023 05:12 )  PTT:37.0 sec      RADIOLOGY & ADDITIONAL STUDIES: CXR from 6/7 noted to have increased consolidation in left lower lobe vs. 6/6. Official read: no acute infiltrate, probable small left pleural effusion. No pneumothorax.       Culture - Blood (collected 06-06-23 @ 21:00)  Source: .Blood Blood-Peripheral  Preliminary Report (06-07-23 @ 10:01):    No growth at 12 hours    Culture - Blood (collected 06-06-23 @ 21:00)  Source: .Blood Blood-Peripheral  Preliminary Report (06-07-23 @ 10:00):    No growth at 12 hours

## 2023-06-07 NOTE — PROGRESS NOTE ADULT - SUBJECTIVE AND OBJECTIVE BOX
Patient is a 53y Male seen and evaluated at bedside. Remains intubated, sedated. On AQ overnight, was net neg 0.7L/24 hours. Continues to be non-oliguric      Meds:    acetylcysteine 20%  Inhalation 4 every 6 hours  albuterol/ipratropium for Nebulization 3 every 6 hours  artificial  tears Solution 1 two times a day  cefTRIAXone   IVPB 2000 every 24 hours  chlorhexidine 0.12% Liquid 15 every 12 hours  chlorhexidine 2% Cloths 1 daily  dexMEDEtomidine Infusion 1 <Continuous>  dextrose 50% Injectable 25 once  dextrose Oral Gel 15 once PRN  fentaNYL   Infusion... 0.5 <Continuous>  glucagon  Injectable 1 once  lacosamide IVPB 250 every 12 hours  lipid, fat emulsion (Fish Oil and Plant Based) 20% Infusion 0.7 <Continuous>  pantoprazole  Injectable 40 daily  Parenteral Nutrition - Adult 1 <Continuous>  Parenteral Nutrition - Adult 1 <Continuous>  sodium chloride 0.9% lock flush 10 every 1 hour PRN  sodium chloride 3%  Inhalation 4 every 6 hours      T(C): , Max: 38.7 (23 @ 18:06)  T(F): , Max: 101.7 (23 @ 18:06)  HR: 137 (23 @ 13:00)  BP: --  BP(mean): --  RR: 18 (23 @ 12:00)  SpO2: 95% (23 @ 13:00)  Wt(kg): --     @ 07:  -   @ 07:00  --------------------------------------------------------  IN: 3151.7 mL / OUT: 3851 mL / NET: -699.3 mL     @ 07:01  -   @ 13:41  --------------------------------------------------------  IN: 793 mL / OUT: 327 mL / NET: 466 mL          Review of Systems:  ROS negative except as per HPI      PHYSICAL EXAM:  GENERAL: intubated and sedated  CHEST/LUNG: mechanical breath sounds  HEART: normal S1S2, RRR  ABDOMEN: Soft, Nontender, +BS,   EXTREMITIES: +2 edema BL LE UE   ACCESS: L subclavian HD cath (placed )      LABS:                        7.2    8.59  )-----------( 81       ( 2023 05:12 )             22.4     -    133<L>  |  102  |  36<H>  ----------------------------<  135<H>  3.9   |  24  |  0.88    Ca    8.1<L>      2023 05:12  Phos  4.3       Mg     2.1         TPro  5.5<L>  /  Alb  2.2<L>  /  TBili  1.0  /  DBili  0.6<H>  /  AST  20  /  ALT  14  /  AlkPhos  178<H>        PT/INR - ( 2023 05:12 )   PT: 15.8 sec;   INR: 1.32          PTT - ( 2023 05:12 )  PTT:37.0 sec          RADIOLOGY & ADDITIONAL STUDIES:        Phosphorus Level, Serum: 4.3 mg/dL (23 @ 05:12)  Hemoglobin: 7.2 g/dL (23 @ 05:12)  Hemoglobin: 7.6 g/dL (23 @ 12:33)  Phosphorus Level, Serum: 3.9 mg/dL (23 @ 12:32)    Albumin, Serum: 2.2 g/dL (23 @ 05:12)  Albumin, Serum: 2.1 g/dL (23 @ 04:27)    T(C): 38 (23 @ 09:00), Max: 38.7 (23 @ 18:06)  HR: 137 (23 @ 13:00) (90 - 137)  BP: --  RR: 18 (23 @ 12:00) (13 - 55)  SpO2: 95% (23 @ 13:00) (95% - 100%)      Hemodialysis Treatment.:     Schedule: Once, Modality: Hemodialysis, Access: Internal Jugular Central Venous Catheter    Dialyzer: Optiflux [id number], Time: 180 Min    Blood Flow: 300 mL/Min , Dialysate Flow: 500 mL/Min, Dialysate Temp: 36.5, Tubinmm (Adult)    Target Fluid Removal: 2 Liters    Dialysate Electrolytes (mEq/L): Potassium 2, Calcium 2.5, Sodium 138, Bicarbonate 35    Additional Instructions: first half HD, last half of Tx UF only (23 @ 00:55) [Active]

## 2023-06-07 NOTE — PROGRESS NOTE ADULT - SUBJECTIVE AND OBJECTIVE BOX
Neurology Progress Note    Interval History:  Patient was seen and examined at bedside. He opened his eyes when his name was called but did not follow commands and unable to get ROS.       Medications:  acetylcysteine 20%  Inhalation 4 milliLiter(s) Inhalation every 6 hours  albuterol/ipratropium for Nebulization 3 milliLiter(s) Nebulizer every 6 hours  artificial  tears Solution 1 Drop(s) Both EYES two times a day  cefTRIAXone   IVPB 2000 milliGRAM(s) IV Intermittent every 24 hours  chlorhexidine 0.12% Liquid 15 milliLiter(s) Oral Mucosa every 12 hours  chlorhexidine 2% Cloths 1 Application(s) Topical daily  dexMEDEtomidine Infusion 1 MICROgram(s)/kG/Hr IV Continuous <Continuous>  dextrose 50% Injectable 25 Gram(s) IV Push once  dextrose Oral Gel 15 Gram(s) Oral once PRN  diatrizoate meglumine/diatrizoate sodium. 30 milliLiter(s) Oral once  fentaNYL   Infusion... 0.5 MICROgram(s)/kG/Hr IV Continuous <Continuous>  glucagon  Injectable 1 milliGRAM(s) IntraMuscular once  HYDROmorphone  Injectable 2 milliGRAM(s) IV Push once  lacosamide IVPB 250 milliGRAM(s) IV Intermittent every 12 hours  lipid, fat emulsion (Fish Oil and Plant Based) 20% Infusion 0.7 Gm/kG/Day IV Continuous <Continuous>  pantoprazole  Injectable 40 milliGRAM(s) IV Push daily  Parenteral Nutrition - Adult 1 Each TPN Continuous <Continuous>  Parenteral Nutrition - Adult 1 Each TPN Continuous <Continuous>  sodium chloride 0.9% lock flush 10 milliLiter(s) IV Push every 1 hour PRN  sodium chloride 3%  Inhalation 4 milliLiter(s) Inhalation every 6 hours      Vital Signs Last 24 Hrs  T(C): 38 (07 Jun 2023 09:00), Max: 38.7 (06 Jun 2023 18:06)  T(F): 100.4 (07 Jun 2023 09:00), Max: 101.7 (06 Jun 2023 18:06)  HR: 110 (07 Jun 2023 09:00) (90 - 134)  BP: --  BP(mean): --  RR: 21 (07 Jun 2023 09:00) (13 - 55)  SpO2: 100% (07 Jun 2023 09:00) (99% - 100%)    Parameters below as of 07 Jun 2023 09:00  Patient On (Oxygen Delivery Method): BiPAP/CPAP    O2 Concentration (%): 40    General: intubated and sedated, did not follow command,   HEENT: NC/AT, no scleral icterus  Neck: Supple, no JVD  Respiratory: lungs CTA b/l, no accessory muscle use, on CPAP mode w/ ET tube in place  Cardiovascular: Regular rhythm/rate; +S1 +S2, no murmurs  Gastrointestinal: Soft, +BS, anterior abdominal CODIE drains  Extremities: WWP, 2+ pitting edema on all four extremities  Neurological: sedated on vent, opens eyes spontaneously  Skin: Normal temperature, warm, dry    Labs:  CBC Full  -  ( 07 Jun 2023 05:12 )  WBC Count : 8.59 K/uL  RBC Count : 2.49 M/uL  Hemoglobin : 7.2 g/dL  Hematocrit : 22.4 %  Platelet Count - Automated : 81 K/uL  Mean Cell Volume : 90.0 fl  Mean Cell Hemoglobin : 28.9 pg  Mean Cell Hemoglobin Concentration : 32.1 gm/dL  Auto Neutrophil # : x  Auto Lymphocyte # : x  Auto Monocyte # : x  Auto Eosinophil # : x  Auto Basophil # : x  Auto Neutrophil % : x  Auto Lymphocyte % : x  Auto Monocyte % : x  Auto Eosinophil % : x  Auto Basophil % : x    06-07    133<L>  |  102  |  36<H>  ----------------------------<  135<H>  3.9   |  24  |  0.88    Ca    8.1<L>      07 Jun 2023 05:12  Phos  4.3     06-07  Mg     2.1     06-07    TPro  5.5<L>  /  Alb  2.2<L>  /  TBili  1.0  /  DBili  0.6<H>  /  AST  20  /  ALT  14  /  AlkPhos  178<H>  06-07    LIVER FUNCTIONS - ( 07 Jun 2023 05:12 )  Alb: 2.2 g/dL / Pro: 5.5 g/dL / ALK PHOS: 178 U/L / ALT: 14 U/L / AST: 20 U/L / GGT: x           PT/INR - ( 07 Jun 2023 05:12 )   PT: 15.8 sec;   INR: 1.32          PTT - ( 07 Jun 2023 05:12 )  PTT:37.0 sec     Neurology Progress Note    Interval History:  Patient was seen and examined at bedside. He opened his eyes when his name was called but did not follow commands and unable to get ROS.       Medications:  acetylcysteine 20%  Inhalation 4 milliLiter(s) Inhalation every 6 hours  albuterol/ipratropium for Nebulization 3 milliLiter(s) Nebulizer every 6 hours  artificial  tears Solution 1 Drop(s) Both EYES two times a day  cefTRIAXone   IVPB 2000 milliGRAM(s) IV Intermittent every 24 hours  chlorhexidine 0.12% Liquid 15 milliLiter(s) Oral Mucosa every 12 hours  chlorhexidine 2% Cloths 1 Application(s) Topical daily  dexMEDEtomidine Infusion 1 MICROgram(s)/kG/Hr IV Continuous <Continuous>  dextrose 50% Injectable 25 Gram(s) IV Push once  dextrose Oral Gel 15 Gram(s) Oral once PRN  diatrizoate meglumine/diatrizoate sodium. 30 milliLiter(s) Oral once  fentaNYL   Infusion... 0.5 MICROgram(s)/kG/Hr IV Continuous <Continuous>  glucagon  Injectable 1 milliGRAM(s) IntraMuscular once  HYDROmorphone  Injectable 2 milliGRAM(s) IV Push once  lacosamide IVPB 250 milliGRAM(s) IV Intermittent every 12 hours  lipid, fat emulsion (Fish Oil and Plant Based) 20% Infusion 0.7 Gm/kG/Day IV Continuous <Continuous>  pantoprazole  Injectable 40 milliGRAM(s) IV Push daily  Parenteral Nutrition - Adult 1 Each TPN Continuous <Continuous>  Parenteral Nutrition - Adult 1 Each TPN Continuous <Continuous>  sodium chloride 0.9% lock flush 10 milliLiter(s) IV Push every 1 hour PRN  sodium chloride 3%  Inhalation 4 milliLiter(s) Inhalation every 6 hours      Vital Signs Last 24 Hrs  T(C): 38 (07 Jun 2023 09:00), Max: 38.7 (06 Jun 2023 18:06)  T(F): 100.4 (07 Jun 2023 09:00), Max: 101.7 (06 Jun 2023 18:06)  HR: 110 (07 Jun 2023 09:00) (90 - 134)  BP: --  BP(mean): --  RR: 21 (07 Jun 2023 09:00) (13 - 55)  SpO2: 100% (07 Jun 2023 09:00) (99% - 100%)    Parameters below as of 07 Jun 2023 09:00  Patient On (Oxygen Delivery Method): BiPAP/CPAP    O2 Concentration (%): 40    General: intubated and sedated, did not follow command,   Neurological: sedated on vent, opens eyes spontaneously      Labs:  CBC Full  -  ( 07 Jun 2023 05:12 )  WBC Count : 8.59 K/uL  RBC Count : 2.49 M/uL  Hemoglobin : 7.2 g/dL  Hematocrit : 22.4 %  Platelet Count - Automated : 81 K/uL  Mean Cell Volume : 90.0 fl  Mean Cell Hemoglobin : 28.9 pg  Mean Cell Hemoglobin Concentration : 32.1 gm/dL  Auto Neutrophil # : x  Auto Lymphocyte # : x  Auto Monocyte # : x  Auto Eosinophil # : x  Auto Basophil # : x  Auto Neutrophil % : x  Auto Lymphocyte % : x  Auto Monocyte % : x  Auto Eosinophil % : x  Auto Basophil % : x    06-07    133<L>  |  102  |  36<H>  ----------------------------<  135<H>  3.9   |  24  |  0.88    Ca    8.1<L>      07 Jun 2023 05:12  Phos  4.3     06-07  Mg     2.1     06-07    TPro  5.5<L>  /  Alb  2.2<L>  /  TBili  1.0  /  DBili  0.6<H>  /  AST  20  /  ALT  14  /  AlkPhos  178<H>  06-07    LIVER FUNCTIONS - ( 07 Jun 2023 05:12 )  Alb: 2.2 g/dL / Pro: 5.5 g/dL / ALK PHOS: 178 U/L / ALT: 14 U/L / AST: 20 U/L / GGT: x           PT/INR - ( 07 Jun 2023 05:12 )   PT: 15.8 sec;   INR: 1.32          PTT - ( 07 Jun 2023 05:12 )  PTT:37.0 sec

## 2023-06-07 NOTE — PROGRESS NOTE ADULT - SUBJECTIVE AND OBJECTIVE BOX
Operation / Date:   5/5/23: second stage TEVAR  3/20: aorto-axillary bypass, AV replacement (bio 23mm), and CABG x 1 (SVG-RCA) EF 75%  2013: hx type A dissection and repair, CABG    SUBJECTIVE ASSESSMENT: Patient seen and examined at bedside. Unable to obtain Review of Systems  CONSTITUTIONAL:  Denies Fevers / chills, sweats, fatigue, weight loss, weight gain                                      NEURO:  Denies changes in sensation, seizures, syncope, confusion                                                                            EYES:  Denies Blurry vision, discharge, pain, loss of vision                                                                                    ENMT:  Denies Difficulty hearing, vertigo, dysphagia, epistaxis, recent dental work                                       CV:  Denies Chest pain, palpitations, TAYLOR, orthopnea                                                                                          RESPIRATORY:  Denies Wheezing, SOB, cough / sputum, hemoptysis                                                                GI:  Denies Nausea, vomiting, diarrhea, constipation, melena, difficulty swallowing                                               : Denies Hematuria, dysuria, urgency, incontinence                                                                                         MUSCULOSKELETAL:  Denies arthritis, joint swelling, muscle weakness                                                             SKIN/BREAST:  Denies rash, itching, hair loss, masses                                                                                            PSYCH:  Denies depression, anxiety, suicidal ideation                                                                                               HEME/LYMPH:  Denies bruises easily, enlarged lymph nodes, tender lymph nodes                                        ENDOCRINE:  Denies cold intolerance, heat intolerance, polydipsia    Vital Signs Last 24 Hrs  T(C): 38 (07 Jun 2023 09:00), Max: 38.7 (06 Jun 2023 18:06)  T(F): 100.4 (07 Jun 2023 09:00), Max: 101.7 (06 Jun 2023 18:06)  HR: 110 (07 Jun 2023 09:00) (90 - 134)  BP: --  BP(mean): --  RR: 21 (07 Jun 2023 09:00) (13 - 63)  SpO2: 100% (07 Jun 2023 09:00) (99% - 100%)    Parameters below as of 07 Jun 2023 09:00  Patient On (Oxygen Delivery Method): BiPAP/CPAP    O2 Concentration (%): 40  I&O's Detail    06 Jun 2023 07:01  -  07 Jun 2023 07:00  --------------------------------------------------------  IN:    Dexmedetomidine: 346 mL    Enteral Tube Flush: 10 mL    Fat Emulsion (Fish Oil &amp; Plant Based) 20% Infusion: 270.4 mL    FentaNYL: 14 mL    FentaNYL: 101.3 mL    IV PiggyBack: 200 mL    IV PiggyBack: 50 mL    Nepro with Carb Steady: 480 mL    TPN (Total Parenteral Nutrition): 1680 mL  Total IN: 3151.7 mL    OUT:    Bulb (mL): 58 mL    Bulb (mL): 385 mL    Bulb (mL): 160 mL    Drain (mL): 105 mL    Drain (mL): 230 mL    Incontinent per Condom Catheter (mL): 865 mL    Nasogastric/Oral tube (mL): 300 mL    Norepinephrine: 0 mL    Other (mL): 667 mL    Other (mL): 1081 mL  Total OUT: 3851 mL    Total NET: -699.3 mL    07 Jun 2023 07:01  -  07 Jun 2023 12:10  --------------------------------------------------------  IN:    Dexmedetomidine: 35 mL    FentaNYL: 7 mL    Nepro with Carb Steady: 40 mL    TPN (Total Parenteral Nutrition): 140 mL  Total IN: 222 mL    OUT:    Incontinent per Condom Catheter (mL): 75 mL    Other (mL): 103 mL  Total OUT: 178 mL    Total NET: 44 mL    CHEST TUBE:  None  EPICARDIAL WIRES: None  TIE DOWNS: removed    PHYSICAL EXAM:  GENERAL: NAD, lying in ICU bed  HEAD:  Atraumatic, Normocephalic  EYES: EOMI, PERRLA, conjunctiva and sclera clear  ENT: Moist mucous membranes; +NGT  NECK: +trach on vent  CHEST/LUNG: MSI healing well.   HEART: RRR  ABDOMEN: Soft, multiple surgical CODIE drains in place, SS output, pancreatic drain in place. +J-tube   EXTREMITIES: +LE peripheral edema      LABS:                        7.2    8.59  )-----------( 81       ( 07 Jun 2023 05:12 )             22.4     PT/INR - ( 07 Jun 2023 05:12 )   PT: 15.8 sec;   INR: 1.32       PTT - ( 07 Jun 2023 05:12 )  PTT:37.0 sec    06-07    133<L>  |  102  |  36<H>  ----------------------------<  135<H>  3.9   |  24  |  0.88    Ca    8.1<L>      07 Jun 2023 05:12  Phos  4.3     06-07  Mg     2.1     06-07    TPro  5.5<L>  /  Alb  2.2<L>  /  TBili  1.0  /  DBili  0.6<H>  /  AST  20  /  ALT  14  /  AlkPhos  178<H>  06-07    MEDICATIONS  (STANDING):  acetylcysteine 20%  Inhalation 4 milliLiter(s) Inhalation every 6 hours  albuterol/ipratropium for Nebulization 3 milliLiter(s) Nebulizer every 6 hours  artificial  tears Solution 1 Drop(s) Both EYES two times a day  cefTRIAXone   IVPB 2000 milliGRAM(s) IV Intermittent every 24 hours  chlorhexidine 0.12% Liquid 15 milliLiter(s) Oral Mucosa every 12 hours  chlorhexidine 2% Cloths 1 Application(s) Topical daily  dexMEDEtomidine Infusion 1 MICROgram(s)/kG/Hr (17.5 mL/Hr) IV Continuous <Continuous>  dextrose 50% Injectable 25 Gram(s) IV Push once  diatrizoate meglumine/diatrizoate sodium. 30 milliLiter(s) Oral once  fentaNYL   Infusion... 0.5 MICROgram(s)/kG/Hr (1.75 mL/Hr) IV Continuous <Continuous>  glucagon  Injectable 1 milliGRAM(s) IntraMuscular once  HYDROmorphone  Injectable 2 milliGRAM(s) IV Push once  lacosamide IVPB 250 milliGRAM(s) IV Intermittent every 12 hours  lipid, fat emulsion (Fish Oil and Plant Based) 20% Infusion 0.7 Gm/kG/Day (20.83 mL/Hr) IV Continuous <Continuous>  lipid, fat emulsion (Fish Oil and Plant Based) 20% Infusion 0.7 Gm/kG/Day (20.83 mL/Hr) IV Continuous <Continuous>  pantoprazole  Injectable 40 milliGRAM(s) IV Push daily  Parenteral Nutrition - Adult 1 Each (70 mL/Hr) TPN Continuous <Continuous>  Parenteral Nutrition - Adult 1 Each (70 mL/Hr) TPN Continuous <Continuous>  Parenteral Nutrition - Adult 1 Each (70 mL/Hr) TPN Continuous <Continuous>  sodium chloride 3%  Inhalation 4 milliLiter(s) Inhalation every 6 hours    MEDICATIONS  (PRN):  dextrose Oral Gel 15 Gram(s) Oral once PRN Blood Glucose LESS THAN 70 milliGRAM(s)/deciliter  sodium chloride 0.9% lock flush 10 milliLiter(s) IV Push every 1 hour PRN Pre/post blood products, medications, blood draw, and to maintain line patency    RADIOLOGY & ADDITIONAL TESTS:  < from: Xray Chest 1 View- PORTABLE-Routine (06.07.23 @ 06:36) >    IMPRESSION:    Similar appearance to prior exam 6/6/2023. Tracheostomy. Postop changes.   Nasogastric tube tip in stomach. Venous catheters unchanged. No acute   infiltrate appearing. Probable small left pleural effusion. No   pneumothorax.

## 2023-06-07 NOTE — PROGRESS NOTE ADULT - ASSESSMENT
53-year-old male with PMHx of HTN, Type A aortic dissection s/p Dacron grafts, AV resuspension in 2013, CAD s/p CABG x 1 SVG to RCA (05/2013, Dr. Medina), seizure disorder (last episode on 07/04/2022) S/P replacement of transverse aortic arch, second stage thoracic endovascular aortic repair, aorto-axillary bypass, AV replacement (bio 23mm), in APr 2023 and CABG x 1 (SVG-RCA) EF 75% (Ignacio, 03/2020) now s/p 2nd state TEVAR (05/05/2023) prompting General Surgery consult for acute pancreatitis with large peripancreatic/perigastric fluid collections on CTA abdomen 05/08/2023 followed by acute hemorrhage starting 05/12/2023 requiring 11 U pRBC, but with negative mesenteric angiogram 05/13/2023 prompting Hepatobiliary Surgery reconsult for possible hemorrhagic pancreatitis, 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  Patient is s/p OR washout (05/31/2023), drains (RP, LUQ, pelvis) placement, and abthera wound vac. S/p percutaneous tracheostomy 06/05.    Plan:    -CT A/P  -Wean off sedation  -EEG results  -F/u neurology recs  -Monitor NGT output  -Continue TFs 20cc/hr  -Monitor drain output  -Continue bulb flushes and to gravity after flushing finished  -Rest of care per SICU  -Surgery team1 following

## 2023-06-07 NOTE — PROGRESS NOTE ADULT - ATTENDING COMMENTS
shock 2/2 necrotizing pancreatitis has resolved, iATN non oliguric, 900ml urine last 24hrs. Net 700ml negative with aquapheresis yesterday which has stopped this morning. Plan for intermittent HD today for clearance and 1 L UF. Improving CXR. Noted to have new peripheral eosinophilia. shock has resolved, iATN non oliguric, 900ml urine last 24hrs. Net 700ml negative with aquapheresis yesterday which has stopped this morning. Plan for intermittent HD today for clearance and 1 L UF. Improving CXR. Noted to have new peripheral eosinophilia. shock has resolved, iATN non oliguric, 900ml urine last 24hrs. Net 700ml negative with aquapheresis yesterday which has stopped this morning. Noted to have sinus tachycardia. Plan for intermittent HD today for clearance and 1 L UF. Improving CXR. Noted to have new peripheral eosinophilia.    Addendum: did not tolerate IHD with volume removal due to worsening sinus tachycardia and possible seizure activity, rinsed back.

## 2023-06-07 NOTE — PROGRESS NOTE ADULT - ASSESSMENT
Assesment:  52 YO Male w/ PMHx of HTN, type A aortic dissection (s/p Dacron grafts, AV resuspension in 2013, CAD s/p CABG x 1 SVG to RCA 5/2013 with Dr. Medina), seizure disorder, recent admission 3/20-4/14 for replacement of transverse aortic arch, second stage TEVAR and aorto-axillary bypass, AV replacement (bio 23mm), and CABG x 1 (SVG-RCA) EF 75% (Ignacio, 3/20). Post op c/b severe cardiogenic and vasogenic shock, TREY requiring CVVHD and temporary dialysis, discharged home mid April. Pt then presented to [facility] ED (4/27-4/30) for syncope vs. seizure episode at home. Negative neuro work up for Seizures AED dose adjusted. Presented to [facility] on 5/4 for abdominal pain, imaging revealed recent graft endoleak, transferred to [facility] on 5/5 for surgical mgmt. Pt taken to OR for Second stage TEVAR on 5/5, post op course c/b Klebsiella bacteremia (5/15), resp failure requiring intubation on 5/18, Bronch 5/19 with cx growing kleb, enterobacter (zosyn, erta resistant), E faecalis, strep angionosus, hemorrhagic pancreatitis of unclear etiology with venu-pancreatic abscess (per CT A/P from 5/8 and 5/13) s/p IR aspiration of peripancreatic fluid collection (15cc sanguinous pansensitive kleb 5/22) and paracentesis (4L clear yellow fluid, ascites cx negative) on 5/22, IR venu-pancreatic abscess drainage and placement of drainage catheter on 5/24, as well as PEA arrest on. Pt transferred from CTICU to SICU for further mgmt of hemorrhagic pancreatitis. 5/31 exlap washout & replacement of 3 new JPs and abthera vac with open abdomen w/ gen surg. 6/1 ROTR, old blood evac, no active bleeding, feeding J-tube placed. 6/3 failed extubation, trach placed. Patient remains in SICU with trach on vent, J-tube w/ tube feeds and multiple surgical drains.     Plan:  Problem 1: s/p TEVAR 5/5/23  -Transferred to SICU for further management of hemorrhagic pancreatitis  -ASA held 5/31 per primary team 2/2 blood loss anemia   -continue to monitor BP and HR  -remainder of care of pancreatitis per primary team   -No CTSx tubes, drains or tie downs remain  -CT surgery will continue to follow peripherally     Problem 2: Respiratory failure   -Pt w/ trach placed on 6/3  -S/p multiple bronchs  -Continue abx per ID  -Care per primary team     Problem 3: Hemorrhagic pancreatitis   -S/p IR drainage on 5/24  -S/p exlap washout & replacement of 3 new JPs and abthera vac with open abdomen w/ gen surg on 5/31 and ROTR, ex lap w/ old blood evac on 6/1  -Multiple CODIE drains & 1 IR drain in place being managed by gen surg team  -Care per primary team     Problem 4: Seizure disorder  -Continue Vimpat   -Presently undergoing EEG  -Recs per Epilepsy team  -Care per primary team     I have reviewed clinical labs tests and reports, radiology tests and reports, as well as old patient medical records, and discussed with the refering physician.

## 2023-06-07 NOTE — PROGRESS NOTE ADULT - ASSESSMENT
52 Y Male w/ HTN, aortic dissection previous admission with severe cardiogenic shock and oliguric TREY requiring CVVHD initiation 5/22. Presented to the ED 5/5 with worsening epigastric pain CTA/P revealed continued endoleak hospital course complicated by necrotic/hemorrhagic pancreatitis. Nephrology following for renal failure (initially oliguric, now non-oliguric since 6/5) but CVVHD/Dialysis dependent    #non-oliguric ATN 2/2 cardiogenic shock    Recommend:  Pt non-oliguric, 865ml of urine output over last 24 hours. In general, patient still volume overloaded. Overall hemodynamics improving, remains off pressors  -Since hemodynamically stable, will transition to HD from CVVHD. HD today. Will attempt 2 L UF as BP running 150s-160s, pt overall total body water overloaded though only net pos 0.9L/admission  -After HD, can resume aquapharesis if tolerated by BP. Can run at 40ml/hr UF  -check phos daily. Goal 3-5.5  maintain MAP > 70 for adequate renal perfusion  strict i's and o's  renally dose abx egfr <15  Bladder scan if UO decreases  Check UPCR for quantification of his proteinuria on UA 52 Y Male w/ HTN, aortic dissection previous admission with severe cardiogenic shock and oliguric TREY requiring CVVHD initiation 5/22. Presented to the ED 5/5 with worsening epigastric pain CTA/P revealed continued endoleak hospital course complicated by necrotic/hemorrhagic pancreatitis. Nephrology following for renal failure (initially oliguric, now non-oliguric since 6/5) but CVVHD/Dialysis dependent    #non-oliguric ATN 2/2 cardiogenic shock    Recommend:  Pt non-oliguric, 865ml of urine output over last 24 hours. In general, patient still volume overloaded. Overall hemodynamics improving, remains off pressors  -Since hemodynamically stable, will transition to HD from CVVHD. HD today. Will attempt 2 L UF as BP running 150s-160s, pt overall total body water overloaded though only net pos 0.9L/admission  -check phos daily. Goal 3-5.5  maintain MAP > 70 for adequate renal perfusion  strict i's and o's  renally dose abx egfr <15  Bladder scan if UO decreases

## 2023-06-07 NOTE — PROGRESS NOTE ADULT - SUBJECTIVE AND OBJECTIVE BOX
ON: Switched back to AC, pt appearing uncomfortable, tachycardic (no longer febrile), tachypneic. Continued facial twitching - spoke with EEG not actively seizing. Sedation Incr  6/6: EEG placed will result in am.  Reordered TPN. BMP: CBC stable. Switched to aquaphoresis.  Worked with PT.  spiked fever blood cx cxr.       SUBJECTIVE: Patient seen and examined bedside; arousable, nodding with head, denied pain at this moment, resting in bed.    cefTRIAXone   IVPB 2000 milliGRAM(s) IV Intermittent every 24 hours      Vital Signs Last 24 Hrs  T(C): 38.1 (07 Jun 2023 05:11), Max: 38.7 (06 Jun 2023 18:06)  T(F): 100.6 (07 Jun 2023 05:11), Max: 101.7 (06 Jun 2023 18:06)  HR: 110 (07 Jun 2023 06:00) (90 - 134)  BP: --  BP(mean): --  RR: 36 (07 Jun 2023 06:00) (13 - 63)  SpO2: 100% (07 Jun 2023 06:00) (97% - 100%)    Parameters below as of 07 Jun 2023 07:00  Patient On (Oxygen Delivery Method): ventilator    O2 Concentration (%): 40  I&O's Detail    05 Jun 2023 07:01  -  06 Jun 2023 07:00  --------------------------------------------------------  IN:    Dexmedetomidine: 26.3 mL    Dexmedetomidine: 198.1 mL    Dexmedetomidine: 47 mL    Fat Emulsion (Fish Oil &amp; Plant Based) 20% Infusion: 270.4 mL    FentaNYL: 21 mL    FentaNYL: 190.7 mL    FentaNYL: 8.8 mL    IV PiggyBack: 50 mL    IV PiggyBack: 50 mL    Nepro with Carb Steady: 140 mL    Norepinephrine: 89.7 mL    Other (mL): 20 mL    Platelets - Single Donor: 207 mL    PRBCs (Packed Red Blood Cells): 330 mL    Propofol: 171 mL    TPN (Total Parenteral Nutrition): 1680 mL  Total IN: 3499.8 mL    OUT:    Bulb (mL): 90 mL    Bulb (mL): 140 mL    Bulb (mL): 170 mL    Drain (mL): 315 mL    Drain (mL): 125 mL    Fat Emulsion (Fish Oil &amp; Plant Based) 20% Infusion: 0 mL    Incontinent per Condom Catheter (mL): 760 mL    Nasogastric/Oral tube (mL): 200 mL    Other (mL): 2758 mL  Total OUT: 4558 mL    Total NET: -1058.2 mL      06 Jun 2023 07:01  -  07 Jun 2023 06:30  --------------------------------------------------------  IN:    Dexmedetomidine: 311 mL    Enteral Tube Flush: 10 mL    Fat Emulsion (Fish Oil &amp; Plant Based) 20% Infusion: 249.6 mL    FentaNYL: 94.3 mL    FentaNYL: 14 mL    IV PiggyBack: 100 mL    Nepro with Carb Steady: 440 mL    TPN (Total Parenteral Nutrition): 1610 mL  Total IN: 2828.9 mL    OUT:    Bulb (mL): 160 mL    Bulb (mL): 385 mL    Bulb (mL): 58 mL    Drain (mL): 230 mL    Drain (mL): 105 mL    Incontinent per Condom Catheter (mL): 865 mL    Nasogastric/Oral tube (mL): 300 mL    Norepinephrine: 0 mL    Other (mL): 667 mL    Other (mL): 959 mL  Total OUT: 3729 mL    Total NET: -900.1 mL      PE:    General: NAD, resting comfortably in bed  C/V: NSR, s1 s2  Pulm: Trach to vent, nonlabored breathing, no respiratory distress  Abd: Soft, NTND; all drains serosanguinous, except the pancreatic drain which is bilious  Extrem: WWP        LABS:                        7.2    8.59  )-----------( 81       ( 07 Jun 2023 05:12 )             22.4     06-07    133<L>  |  102  |  36<H>  ----------------------------<  135<H>  3.9   |  24  |  0.88    Ca    8.1<L>      07 Jun 2023 05:12  Phos  4.3     06-07  Mg     2.1     06-07    TPro  5.5<L>  /  Alb  2.2<L>  /  TBili  1.0  /  DBili  0.6<H>  /  AST  20  /  ALT  14  /  AlkPhos  178<H>  06-07    PT/INR - ( 07 Jun 2023 05:12 )   PT: 15.8 sec;   INR: 1.32          PTT - ( 07 Jun 2023 05:12 )  PTT:37.0 sec      RADIOLOGY & ADDITIONAL STUDIES:

## 2023-06-07 NOTE — PROGRESS NOTE ADULT - SUBJECTIVE AND OBJECTIVE BOX
Phosphorus Level, Serum: 4.3 mg/dL (23 @ 05:12)  Hemoglobin: 7.2 g/dL (23 @ 05:12)  Hemoglobin: 7.6 g/dL (23 @ 12:33)  Phosphorus Level, Serum: 3.9 mg/dL (23 @ 12:32)    Hepatitis B Surface Antigen: Nonreact (23 @ 13:55)  Hepatitis B Surface Antibody: Reactive (23 @ 13:55)    T(C): 37.2 (23 @ 16:01), Max: 38.7 (23 @ 18:06)  HR: 113 (23 @ 16:01) (90 - 137)  BP: --  RR: 20 (23 @ 14:00) (13 - 55)  SpO2: 100% (23 @ 16:00) (95% - 100%)      Hemodialysis Treatment.:     Schedule: Once, Modality: Hemodialysis, Access: Internal Jugular Central Venous Catheter    Dialyzer: Optiflux [id number], Time: 180 Min    Blood Flow: 300 mL/Min , Dialysate Flow: 500 mL/Min, Dialysate Temp: 36.5, Tubinmm (Adult)    Target Fluid Removal: 1.5 Liters    Dialysate Electrolytes (mEq/L): Potassium 2, Calcium 2.5, Sodium 138, Bicarbonate 35    Additional Instructions: first half HD, last half of Tx UF only (23 @ 00:55) [Completed]        Pt was seen on HD. Pre-HD /67 . After initiation of HD, pt became more tachycardic in 130s going up to 140. BP was also dropping. UF was stopped. Levo was started for hypotension, ketamine for pain. BP and HR improved. HD was continued for clearance only. Pt finished Tx with no UF. Post-HD /59,    Given the tachycardia, will recommend not restarting aquapharesis today. Will reassess tomorrow AM

## 2023-06-07 NOTE — PROGRESS NOTE ADULT - SUBJECTIVE AND OBJECTIVE BOX
Patient seen bedside. Drain outputs are as follows:    1. 16 Fr teal LUQ peripancreatic abscess "Left Pancreatic CODIE Drain" placed on 5/24 (attached to gravity bag):   -230cc serosanguinous output in 24 hrs. 315cc in previous 24 hrs. Flushed per nursing.     2. 16 Fr teal LUQ hematoma "Left Abdominal Hematoma CODIE Drain" placed on 5/25 and upsized on 5/26:   -105cc dark brown output in 24 hrs. 125cc in previous 24 hrs. Flushed easily with 3cc sterile NS.     Pt intermittently febrile yd PM through today AM. WBC 8.6. H/H 7.2/22.4 today, down from 7.6/23.0 yd.

## 2023-06-07 NOTE — PROGRESS NOTE ADULT - ASSESSMENT
53 year old male with PMH of HTN, Type A Aortic dissection s/p Dacron grafts, AV resuspension (2013), CAD s/p CABG x 1 SVG to RCA (2013, Dr. Medina), seizure disorder, with recent admit (Mar/Apr) for replacement of transverse aortic arch, second stage TEVAR and aoto-axillary bypass, AV replacement, and CABG x1. Post op course c/b shock, TREY requiring CVVHD, dialysis. Readmitted on 4/27 ([facility] ED) for syncope at home where imaging revealed graft endoleak and pancreatitis. Patient taken back to OR 5/5 for TEVAR, with post op course now complicated by Klebseilla bacteremia (5/15), respiratory failure requiring intubation (5/18), and bronch and PEA arrest (5/19), with respiratory cultures revealing Enterobacter, E. Faecalis and Strep angionosus. Surgery reconsulted re: active hemorrhagic pancreatitis on CT (5/13), with subsequent IR aspiration of peripancreatic fluid collection (15cc, Kleb) and paracentesis - 4L (5/22). Patient started on CVVHD 5/22. Transferred to SICU (5/25), with catheter exchange and two additional drains placed on 5/26 in IR and exlap, wash out and new drain placement on 5/31. The patient returned to the OR 6/1 for evacuation of blood and placement of J tube. Patient failed extubation on 6/3 and received trach on 6/5. Noted to be febrile on 6/7.    NEURO: Continue patient on Precedex gtt for sedation, RASS goal 0/-1. Continue Fentanyl gtt for pain control. Continue EEG monitoring - no active seizure activity. Continue 250mg Lacosamide IV. Neuro checks per protocol and prn. Bedrest.  HEENT: Artificial tears as needed.  CV: Maintain MAP > 65 - not on pressors. Monitor H/H, acute signs of bleed.   PULM: Failed CPAP trial 6/7, return to AC 40/500/22/8. Continue daily vent weaning, CPAP trial. Persistent consolidation on CXR, obtain new sputum culture (febrile 6/7). Pulmonary toileting. Continue Duonebs, Mucomyst and 3% saline neb q 6 hours.  GI/FEN: Continue TPN @ 70cc/hr. Continue TF - Jevity @ 20 cc/hr. NGT to LIWS. Clamp for oral contrast and obtain CT Chest/Ab/Pelvis. Monitor drain output. Continue Protonix 40mg BID.  : TREY. Continue CVVHD with goal net negative 1000mL. Continue to monitor U/O and obtain U/A in setting of fever.   HEME: H/H and Platelets stable. Continue to monitor. Maintain current T&S. Hold subcu hepatin.  ENDO: No active issues. POC Glucose checks per TPN protocol.   ID: Continue 2g Ceftriaxone OD for Kleb bacteremia. Obtain blood cultures x 2. No growth appreciated from bronch.  PPX: SCDs.   LINES: L radial kalpana (5/31), Left subclavian HD Cath (5/31), RIJ TLC (6/1) LIJ TLC (5/23)  WOUNDS/DRAINS: Right OR CODIE, Left OR CODIE, left IR hematoma drain, left IR pancreatic drain. Continue to monitor output and flush IR/panc drain q shift 250cc.   PT: Deferred today.  Dispo: SICU

## 2023-06-07 NOTE — PROGRESS NOTE ADULT - ASSESSMENT
54yo Male pt with PMH HTN, type A aortic dissection s/p Dacron grafts, AV resuspension in 2013, CAD s/p CABG x 1 SVG to RCA (5/2013 with Dr. Medina), seizure disorder (last episode on 7/4/22) S/P replacement of transverse aortic arch, second stage thoracic endovascular aortic repair, aorto-axillary bypass, AV replacement (bio 23mm), in APr 2023 and CABG x 1 (SVG-RCA) EF 75% (Ignacio, 3/20) now s/p 2nd state TEVAR on 5/5 for whom surgery was consulted for finding of acute pancreatitis with large peripancreatic/perigastric fluid collections on CTA abdomen 5/8 then acute hemorrhage starting 5/12/23 requiring 11 U pRBC over last 48 hours but with negative mesenteric angiogram 5/13/23. S/p paracentesis and LUQ collection aspiration (no drain per surgery) on 5/22/23. LUQ collection growing Klebsiella pneumoniae.    5/24/23:  LUQ drain placement by IR.     5/25/23:  LUQ drain upsizing. Placement of two additional 14 F drainage catheters in the left mid and lower abdomen. Placement of a left abdominal hematoma drain.     5/26/23:   Placement of a right ascites drain and right pelvic hematoma/abscess drain by IR. Upsized left abdomen hematoma drain and left pelvic abscess/hematoma drain to 16 Fr.    5/30/23:  LUQ drain placement.     5/31/23:  To OR for ex-lap and abdominal washout. Multiple IR drains removed as described above.     6/1/23:  Return to OR for washout and abdominal closure.     Plan:  -Continue to monitor drain outputs   -F/u repeat CT CAP today 6/7  -IR will continue to follow.

## 2023-06-07 NOTE — PROGRESS NOTE ADULT - ATTENDING COMMENTS
seizures, CAD, s/p endoleak with TEVAR c/b hemorrhagic pancreatitis, Klebsiella septic shock, AHRF, ATN  physical as above  fever today; to replace arterial line and BC, sputum and UA sent  CT of chest, A/P ordered  uncomfortable so ketamine added to fentanyl and precedex  continue aquapheresis; UO increasing though BUN and creatinine rising as well  TPN continues with J-tube jevity 1.2  continue ceftriaxone  continue MV, poor CPAP tolerance today

## 2023-06-08 LAB
ALBUMIN SERPL ELPH-MCNC: 2.1 G/DL — LOW (ref 3.3–5)
ALP SERPL-CCNC: 159 U/L — HIGH (ref 40–120)
ALT FLD-CCNC: 11 U/L — SIGNIFICANT CHANGE UP (ref 10–45)
ANION GAP SERPL CALC-SCNC: 8 MMOL/L — SIGNIFICANT CHANGE UP (ref 5–17)
APPEARANCE UR: CLEAR — SIGNIFICANT CHANGE UP
APTT BLD: 36.2 SEC — HIGH (ref 27.5–35.5)
AST SERPL-CCNC: 17 U/L — SIGNIFICANT CHANGE UP (ref 10–40)
BACTERIA # UR AUTO: PRESENT /HPF
BASE EXCESS BLDA CALC-SCNC: 3.5 MMOL/L — HIGH (ref -2–3)
BILIRUB DIRECT SERPL-MCNC: 0.4 MG/DL — HIGH (ref 0–0.3)
BILIRUB INDIRECT FLD-MCNC: 0.4 MG/DL — SIGNIFICANT CHANGE UP (ref 0.2–1)
BILIRUB SERPL-MCNC: 0.8 MG/DL — SIGNIFICANT CHANGE UP (ref 0.2–1.2)
BILIRUB UR-MCNC: ABNORMAL
BLD GP AB SCN SERPL QL: NEGATIVE — SIGNIFICANT CHANGE UP
BUN SERPL-MCNC: 34 MG/DL — HIGH (ref 7–23)
CALCIUM SERPL-MCNC: 7.7 MG/DL — LOW (ref 8.4–10.5)
CHLORIDE SERPL-SCNC: 100 MMOL/L — SIGNIFICANT CHANGE UP (ref 96–108)
CO2 BLDA-SCNC: 29 MMOL/L — HIGH (ref 19–24)
CO2 SERPL-SCNC: 27 MMOL/L — SIGNIFICANT CHANGE UP (ref 22–31)
COLOR SPEC: YELLOW — SIGNIFICANT CHANGE UP
COMMENT - URINE: SIGNIFICANT CHANGE UP
CREAT SERPL-MCNC: 0.8 MG/DL — SIGNIFICANT CHANGE UP (ref 0.5–1.3)
DIFF PNL FLD: ABNORMAL
EGFR: 105 ML/MIN/1.73M2 — SIGNIFICANT CHANGE UP
EPI CELLS # UR: SIGNIFICANT CHANGE UP /HPF (ref 0–5)
GAS PNL BLDA: SIGNIFICANT CHANGE UP
GLUCOSE BLDC GLUCOMTR-MCNC: 108 MG/DL — HIGH (ref 70–99)
GLUCOSE BLDC GLUCOMTR-MCNC: 127 MG/DL — HIGH (ref 70–99)
GLUCOSE SERPL-MCNC: 120 MG/DL — HIGH (ref 70–99)
GLUCOSE UR QL: 100
GRAN CASTS # UR COMP ASSIST: ABNORMAL /LPF
HCO3 BLDA-SCNC: 28 MMOL/L — SIGNIFICANT CHANGE UP (ref 21–28)
HCT VFR BLD CALC: 22.5 % — LOW (ref 39–50)
HGB BLD-MCNC: 7.1 G/DL — LOW (ref 13–17)
INR BLD: 1.37 — HIGH (ref 0.88–1.16)
KETONES UR-MCNC: NEGATIVE — SIGNIFICANT CHANGE UP
LEUKOCYTE ESTERASE UR-ACNC: NEGATIVE — SIGNIFICANT CHANGE UP
MAGNESIUM SERPL-MCNC: 2.1 MG/DL — SIGNIFICANT CHANGE UP (ref 1.6–2.6)
MCHC RBC-ENTMCNC: 28.4 PG — SIGNIFICANT CHANGE UP (ref 27–34)
MCHC RBC-ENTMCNC: 31.6 GM/DL — LOW (ref 32–36)
MCV RBC AUTO: 90 FL — SIGNIFICANT CHANGE UP (ref 80–100)
NITRITE UR-MCNC: NEGATIVE — SIGNIFICANT CHANGE UP
NRBC # BLD: 0 /100 WBCS — SIGNIFICANT CHANGE UP (ref 0–0)
PCO2 BLDA: 40 MMHG — SIGNIFICANT CHANGE UP (ref 35–48)
PH BLDA: 7.45 — SIGNIFICANT CHANGE UP (ref 7.35–7.45)
PH UR: 6 — SIGNIFICANT CHANGE UP (ref 5–8)
PHOSPHATE SERPL-MCNC: 3.7 MG/DL — SIGNIFICANT CHANGE UP (ref 2.5–4.5)
PLATELET # BLD AUTO: 84 K/UL — LOW (ref 150–400)
PO2 BLDA: 168 MMHG — HIGH (ref 83–108)
POTASSIUM SERPL-MCNC: 3.9 MMOL/L — SIGNIFICANT CHANGE UP (ref 3.5–5.3)
POTASSIUM SERPL-SCNC: 3.9 MMOL/L — SIGNIFICANT CHANGE UP (ref 3.5–5.3)
PROT SERPL-MCNC: 5.4 G/DL — LOW (ref 6–8.3)
PROT UR-MCNC: 100 MG/DL
PROTHROM AB SERPL-ACNC: 16.4 SEC — HIGH (ref 10.5–13.4)
RBC # BLD: 2.5 M/UL — LOW (ref 4.2–5.8)
RBC # FLD: 15.9 % — HIGH (ref 10.3–14.5)
RBC CASTS # UR COMP ASSIST: ABNORMAL /HPF
RH IG SCN BLD-IMP: POSITIVE — SIGNIFICANT CHANGE UP
SAO2 % BLDA: 99.3 % — HIGH (ref 94–98)
SODIUM SERPL-SCNC: 135 MMOL/L — SIGNIFICANT CHANGE UP (ref 135–145)
SP GR SPEC: 1.02 — SIGNIFICANT CHANGE UP (ref 1–1.03)
TRIGL SERPL-MCNC: 101 MG/DL — SIGNIFICANT CHANGE UP
UROBILINOGEN FLD QL: 0.2 E.U./DL — SIGNIFICANT CHANGE UP
WBC # BLD: 8.83 K/UL — SIGNIFICANT CHANGE UP (ref 3.8–10.5)
WBC # FLD AUTO: 8.83 K/UL — SIGNIFICANT CHANGE UP (ref 3.8–10.5)
WBC UR QL: < 5 /HPF — SIGNIFICANT CHANGE UP

## 2023-06-08 PROCEDURE — 99233 SBSQ HOSP IP/OBS HIGH 50: CPT | Mod: GC

## 2023-06-08 PROCEDURE — 95720 EEG PHY/QHP EA INCR W/VEEG: CPT

## 2023-06-08 PROCEDURE — 71045 X-RAY EXAM CHEST 1 VIEW: CPT | Mod: 26

## 2023-06-08 PROCEDURE — 99291 CRITICAL CARE FIRST HOUR: CPT

## 2023-06-08 PROCEDURE — 99233 SBSQ HOSP IP/OBS HIGH 50: CPT

## 2023-06-08 RX ORDER — HYDROMORPHONE HYDROCHLORIDE 2 MG/ML
3 INJECTION INTRAMUSCULAR; INTRAVENOUS; SUBCUTANEOUS EVERY 4 HOURS
Refills: 0 | Status: DISCONTINUED | OUTPATIENT
Start: 2023-06-08 | End: 2023-06-14

## 2023-06-08 RX ORDER — KETAMINE HYDROCHLORIDE 100 MG/ML
0.2 INJECTION INTRAMUSCULAR; INTRAVENOUS
Qty: 200 | Refills: 0 | Status: DISCONTINUED | OUTPATIENT
Start: 2023-06-08 | End: 2023-06-10

## 2023-06-08 RX ORDER — SODIUM CHLORIDE 9 MG/ML
1000 INJECTION, SOLUTION INTRAVENOUS
Refills: 0 | Status: DISCONTINUED | OUTPATIENT
Start: 2023-06-08 | End: 2023-07-13

## 2023-06-08 RX ORDER — CEFTRIAXONE 500 MG/1
2000 INJECTION, POWDER, FOR SOLUTION INTRAMUSCULAR; INTRAVENOUS EVERY 24 HOURS
Refills: 0 | Status: DISCONTINUED | OUTPATIENT
Start: 2023-06-08 | End: 2023-06-09

## 2023-06-08 RX ORDER — HYDROMORPHONE HYDROCHLORIDE 2 MG/ML
1 INJECTION INTRAMUSCULAR; INTRAVENOUS; SUBCUTANEOUS ONCE
Refills: 0 | Status: DISCONTINUED | OUTPATIENT
Start: 2023-06-08 | End: 2023-06-08

## 2023-06-08 RX ORDER — HEPARIN SODIUM 5000 [USP'U]/ML
5000 INJECTION INTRAVENOUS; SUBCUTANEOUS EVERY 8 HOURS
Refills: 0 | Status: DISCONTINUED | OUTPATIENT
Start: 2023-06-08 | End: 2023-06-25

## 2023-06-08 RX ORDER — INSULIN LISPRO 100/ML
VIAL (ML) SUBCUTANEOUS EVERY 6 HOURS
Refills: 0 | Status: DISCONTINUED | OUTPATIENT
Start: 2023-06-08 | End: 2023-07-13

## 2023-06-08 RX ORDER — HYDROMORPHONE HYDROCHLORIDE 2 MG/ML
2 INJECTION INTRAMUSCULAR; INTRAVENOUS; SUBCUTANEOUS EVERY 4 HOURS
Refills: 0 | Status: DISCONTINUED | OUTPATIENT
Start: 2023-06-08 | End: 2023-06-08

## 2023-06-08 RX ORDER — ELECTROLYTE SOLUTION,INJ
1 VIAL (ML) INTRAVENOUS
Refills: 0 | Status: DISCONTINUED | OUTPATIENT
Start: 2023-06-08 | End: 2023-06-08

## 2023-06-08 RX ORDER — FENTANYL CITRATE 50 UG/ML
1 INJECTION INTRAVENOUS
Qty: 5000 | Refills: 0 | Status: DISCONTINUED | OUTPATIENT
Start: 2023-06-08 | End: 2023-06-09

## 2023-06-08 RX ADMIN — SODIUM CHLORIDE 4 MILLILITER(S): 9 INJECTION INTRAMUSCULAR; INTRAVENOUS; SUBCUTANEOUS at 09:13

## 2023-06-08 RX ADMIN — CHLORHEXIDINE GLUCONATE 1 APPLICATION(S): 213 SOLUTION TOPICAL at 11:41

## 2023-06-08 RX ADMIN — LACOSAMIDE 150 MILLIGRAM(S): 50 TABLET ORAL at 18:44

## 2023-06-08 RX ADMIN — HEPARIN SODIUM 5000 UNIT(S): 5000 INJECTION INTRAVENOUS; SUBCUTANEOUS at 22:40

## 2023-06-08 RX ADMIN — CHLORHEXIDINE GLUCONATE 15 MILLILITER(S): 213 SOLUTION TOPICAL at 18:12

## 2023-06-08 RX ADMIN — HYDROMORPHONE HYDROCHLORIDE 3 MILLIGRAM(S): 2 INJECTION INTRAMUSCULAR; INTRAVENOUS; SUBCUTANEOUS at 22:58

## 2023-06-08 RX ADMIN — HYDROMORPHONE HYDROCHLORIDE 3 MILLIGRAM(S): 2 INJECTION INTRAMUSCULAR; INTRAVENOUS; SUBCUTANEOUS at 22:39

## 2023-06-08 RX ADMIN — Medication 3 MILLILITER(S): at 09:12

## 2023-06-08 RX ADMIN — Medication 3 MILLILITER(S): at 15:54

## 2023-06-08 RX ADMIN — Medication 3 MILLILITER(S): at 06:23

## 2023-06-08 RX ADMIN — Medication 975 MILLIGRAM(S): at 03:51

## 2023-06-08 RX ADMIN — HYDROMORPHONE HYDROCHLORIDE 1 MILLIGRAM(S): 2 INJECTION INTRAMUSCULAR; INTRAVENOUS; SUBCUTANEOUS at 12:15

## 2023-06-08 RX ADMIN — Medication 975 MILLIGRAM(S): at 22:40

## 2023-06-08 RX ADMIN — CEFTRIAXONE 100 MILLIGRAM(S): 500 INJECTION, POWDER, FOR SOLUTION INTRAMUSCULAR; INTRAVENOUS at 15:52

## 2023-06-08 RX ADMIN — Medication 975 MILLIGRAM(S): at 22:58

## 2023-06-08 RX ADMIN — Medication 1 DROP(S): at 20:15

## 2023-06-08 RX ADMIN — HYDROMORPHONE HYDROCHLORIDE 2 MILLIGRAM(S): 2 INJECTION INTRAMUSCULAR; INTRAVENOUS; SUBCUTANEOUS at 14:55

## 2023-06-08 RX ADMIN — HYDROMORPHONE HYDROCHLORIDE 2 MILLIGRAM(S): 2 INJECTION INTRAMUSCULAR; INTRAVENOUS; SUBCUTANEOUS at 13:41

## 2023-06-08 RX ADMIN — Medication 975 MILLIGRAM(S): at 11:00

## 2023-06-08 RX ADMIN — CHLORHEXIDINE GLUCONATE 15 MILLILITER(S): 213 SOLUTION TOPICAL at 05:59

## 2023-06-08 RX ADMIN — SODIUM CHLORIDE 4 MILLILITER(S): 9 INJECTION INTRAMUSCULAR; INTRAVENOUS; SUBCUTANEOUS at 15:53

## 2023-06-08 RX ADMIN — LACOSAMIDE 150 MILLIGRAM(S): 50 TABLET ORAL at 06:39

## 2023-06-08 RX ADMIN — Medication 3 MILLILITER(S): at 21:10

## 2023-06-08 RX ADMIN — Medication 1 EACH: at 18:12

## 2023-06-08 RX ADMIN — Medication 975 MILLIGRAM(S): at 15:54

## 2023-06-08 RX ADMIN — Medication 975 MILLIGRAM(S): at 10:35

## 2023-06-08 RX ADMIN — DEXMEDETOMIDINE HYDROCHLORIDE IN 0.9% SODIUM CHLORIDE 17.5 MICROGRAM(S)/KG/HR: 4 INJECTION INTRAVENOUS at 00:43

## 2023-06-08 RX ADMIN — HYDROMORPHONE HYDROCHLORIDE 3 MILLIGRAM(S): 2 INJECTION INTRAMUSCULAR; INTRAVENOUS; SUBCUTANEOUS at 18:14

## 2023-06-08 RX ADMIN — KETAMINE HYDROCHLORIDE 5.25 MG/KG/HR: 100 INJECTION INTRAMUSCULAR; INTRAVENOUS at 18:44

## 2023-06-08 RX ADMIN — Medication 4 MILLILITER(S): at 09:13

## 2023-06-08 RX ADMIN — PANTOPRAZOLE SODIUM 40 MILLIGRAM(S): 20 TABLET, DELAYED RELEASE ORAL at 11:40

## 2023-06-08 RX ADMIN — Medication 975 MILLIGRAM(S): at 16:50

## 2023-06-08 RX ADMIN — SODIUM CHLORIDE 4 MILLILITER(S): 9 INJECTION INTRAMUSCULAR; INTRAVENOUS; SUBCUTANEOUS at 21:10

## 2023-06-08 RX ADMIN — HYDROMORPHONE HYDROCHLORIDE 1 MILLIGRAM(S): 2 INJECTION INTRAMUSCULAR; INTRAVENOUS; SUBCUTANEOUS at 12:30

## 2023-06-08 RX ADMIN — Medication 4 MILLILITER(S): at 21:11

## 2023-06-08 RX ADMIN — Medication 4 MILLILITER(S): at 15:54

## 2023-06-08 RX ADMIN — HYDROMORPHONE HYDROCHLORIDE 3 MILLIGRAM(S): 2 INJECTION INTRAMUSCULAR; INTRAVENOUS; SUBCUTANEOUS at 18:30

## 2023-06-08 RX ADMIN — Medication 4 MILLILITER(S): at 06:21

## 2023-06-08 RX ADMIN — SODIUM CHLORIDE 4 MILLILITER(S): 9 INJECTION INTRAMUSCULAR; INTRAVENOUS; SUBCUTANEOUS at 06:24

## 2023-06-08 RX ADMIN — Medication 1 DROP(S): at 05:59

## 2023-06-08 NOTE — ADVANCED PRACTICE NURSE CONSULT - ASSESSMENT
Sacral/coccyx DTPI evolved to an unstageable pressure injury with 75% red, non-granulating tissue and 25% slough measuring 2.3 cm x 1.5 cm draining small amount of serosanguinous exudate.   Left thoracic area evolving DTPI measuring 7 cm x 2 cm.  Left lower back healing stage 2 pressure injury measuring 0.2 cm x 0.2 cm, no exudate noted.   Left ear healing DTPI with no exudate.   Left shoulder with pressure healed injury.   Two skin tears on left antecubital area with scant serosanguinous exudate.   Patient being turned and repositioned with positioning pillows and wearing heels protectors to offload heels.     Sacral/coccyx DTPI evolved to an unstageable pressure injury with 75% red, non-granulating tissue and 25% slough measuring 2.3 cm x 1.5 cm draining small amount of serosanguinous exudate.   Left thoracic area evolving DTPI measuring 7 cm x 2 cm.  Left ear healing DTPI with no exudate.   Left shoulder and left lower back with pressure healed injury.   Two skin tears on left antecubital area with scant serosanguinous exudate.   Patient being turned and repositioned with positioning pillows and wearing heels protectors to offload heels.

## 2023-06-08 NOTE — PROGRESS NOTE ADULT - ASSESSMENT
53 year-old male with PMHx HTN, type A aortic dissection s/p Dacron grafts, AV resuspension in 2013, CAD s/p CABG x 1 SVG to RCA (5/2013 with Dr. Medina), seizure disorder, recent admission 3/20-4/14 for replacement of transverse aortic arch, second stage TEVAR 5/5 with course c/b peripancreatic/RP hemorrhage/hematoma formation s/p multiple washouts on antibiotics. Epilepsy team initially consulted for history and epilepsy and suspected seizure on 5/12 thought to be provoked. Additional event on 5/14/23 that did not correlate with findings on EEG recordings. Vimpat was increased and he was tapered off Depakote previously due to concerns for possible drug-related hemorrhagic pancreatitis, however, seems less likely and currently pancreatitis is thought to be secondary to gallstones. CTH from 5/18 without acute pathology. Current EEG negative for epileptiform act    Recommendations:  - Continue Vimpat 250mg BID   - Continue vEEG to assess for possible seizures/epileptiform activity  - Could continue to hold depakote, especially given thrombocytopenia  - Ativan 2mg IVP for seizures > 2mins  - Keep Mg>2 and K >4.   - Rest of management per primary team  - call for new or worsening neuro symptoms

## 2023-06-08 NOTE — PROGRESS NOTE ADULT - SUBJECTIVE AND OBJECTIVE BOX
Patient seen bedside. Drain outputs are as follows:    1. 16 Fr teal LUQ peripancreatic abscess "Left Pancreatic CODIE Drain" placed on 5/24 (attached to gravity bag):   -90cc serosanguinous output in 24 hrs. 230cc in previous 24 hrs. Flushed per nursing.     2. 16 Fr teal LUQ hematoma "Left Abdominal Hematoma CODIE Drain" placed on 5/25 and upsized on 5/26:   -190cc dark green-brown output in 24 hrs. 105cc in previous 24 hrs. Flushed easily with 3cc sterile NS.     H/H 7.1/22.5 today (7.2/22.4 yd).

## 2023-06-08 NOTE — PROGRESS NOTE ADULT - ASSESSMENT
53yMale w/ PMHx of HTN, type A aortic dissection s/p Dacron grafts, AV resuspension in 2013, CAD s/p CABG x 1 SVG to RCA (5/2013 with Dr. Medina), seizure disorder, recent admission 3/20-4/14 for replacement of transverse aortic arch, second stage TEVAR and aorto-axillary bypass, AV replacement (bio 23mm), and CABG x 1 (SVG-RCA). Pt found to have endo leak and taken to OR for TEVAR revision on 5/5, Post op course c/b Klebsiella bacteremia (5/15), resp failure requiring intubation on 5/18, Mucus plugging s/p Bronch 5/19 and PEA arrest. Post operatively pt also found to have hemorrhagic pancreatitis with venu-pancreatic abscess possibly 2* to Depakote therefore s/p IR aspiration of peripancreatic fluid collection and paracentesis on 5/22, IR venu-pancreatic abscess drainage and placement of drainage catheter on 5/24. Pt then transferred from CTICU to SICU for further mgmt of hemorrhagic pancreatitis on 5/25. s/p IR placement of 2 new drainage catheters and catheter exchange on 5/26. LUQ drainage 5/30. OR 5/31 exlap washout & replacement of 3 new JPs and abthera vac with open abdomen. RTOR 6/1, no bleeding, evac of old blood, placement of feeding J-tube.     NEURO: Sedation: Fentanyl gtt capped at 1, Precedex gtt, ketamine to 0.15. Hx seizures: epilepsy recs appreciated, Cont vimpat. Depakote weaned off due to concerns for drug-related hemorrhagic pancreatitis. Repeat EEG (ordered)  HEENT: Artificial tears, Torticollis - PT/OT following   CV: MAP > 65 on levophed gtt (likely sedation). s/p PEA arrest on 5/24 night. Echo from 5/19 hyperdynamic LV, SHAJI with LVOTO (gradient 26), normal RV, mild MR, no pulm HTN. ASA 81mg held 2/2 to acute blood loss anemia.    PULM: failed extubation 6/3: s/p Trach 6/5: AC 40/500/22/8 with daily CPAP trial ARDS resolved - off NO, s/p multiple Mucus plug/ Lung collapse s/p bronch x3 (most recently on 5/26) most likely from outward obstruction: Cont rotating pt around the clock. Cont Duonebs, Mucomyst, 3% saline.  GI/FEN: TPN, Trickle TF Jevity @30 via feeding J-tube, Protonix BID, Hemorrhagic pancreatitis: s/p multiple drain placements and paracentisis by IR team.  FOBT+ 5/29. d/c Rectal tube 6/4  : AKF on HD ( CVVHD / AQuaphoreses Nephro following. Cantrell d/c'ed 5/26 - Continue bladder scan daily for poss straight cath.   HEME:  Acute blood loss anemia requiring multiple transfusions  ENDO: mISS  ID: Start Ceftriaxone 2g qd( 6/5- 6/15) , ID following. Kleb bacteremia from 5/15 & 5/17, subsequent bld cx negative, /// DC: Chloe (5/21-6/5),  Caspo (5/23-5/30), Ampicillin ( 5/21- 5/27). PNA w/ bronch cx kleb, enterobacter (zosyn, erta resistant), E faecalis, strep angionosus from 5/19, pancreatic abscess cx pan-sensitive kleb 5/22.   PPX: SCDs, SQH on hold.   LINES: L rad kalpana (5/31--), Lsubclav HD Cath (5/31--), RIJ TLC (6/1--) LIJ TLC (5/23--) // DC: RIJ TLC (5/22-5/27), RIJ HD cath (5/22-31), R Ax kalpana(5/12-5/31)  WOUNDS/DRAINS: right OR CODIE, 2 left OR CODIE, left IR hematoma drain, left IR pancreatic drain (bilious) to BID drainage w/ 250 NS.   PT: ordered 6/5.   Dispo: SICU

## 2023-06-08 NOTE — PROGRESS NOTE ADULT - ATTENDING COMMENTS
shock has resolved, iATN non oliguric, dialysis dependent. d/w team, volume overloaded, will restart aquapheresis goal net negative 1.5-2L over next 24hrs. To stop AQ if becomes hypotensive or if UO drops <30ml/hr

## 2023-06-08 NOTE — PROGRESS NOTE ADULT - ATTENDING COMMENTS
seizures, CAD, s/p endoleak with TEVAR c/b hemorrhagic pancreatitis, Klebsiella septic shock, AHRF, ATN  physical as above  fever stable, await results of BC, sputum and UA negative  CT of chest, A/P with usual LLL infiltrate; drains in place  uncomfortable so ketamine added to fentanyl and precedex with some improvement  continue aquapheresis; UO increasing though BUN and creatinine rising as well  TPN continues with J-tube jevity 1.2  continue ceftriaxone  continue MV, better CPAP tolerance today

## 2023-06-08 NOTE — PROGRESS NOTE ADULT - SUBJECTIVE AND OBJECTIVE BOX
INTERVAL/OVERNIGHT EVENTS: Patient went for CT Chest, Abdomen, Pelvis. Arterial line was replaced, new right radial. Remained off pressors throughout the evening. U/A, sputum, blood cultures sent. Febrile to 100.6 in AM.    SUBJECTIVE: Patient evaluated at bedside. Arousable, RASS -2. Sedated, vented.     SICU Day # 15    Neurologic Medications  acetaminophen   Oral Liquid .. 975 milliGRAM(s) Enteral Tube every 6 hours  dexMEDEtomidine Infusion 1 MICROgram(s)/kG/Hr IV Continuous <Continuous>  fentaNYL   Infusion... 1 MICROgram(s)/kG/Hr IV Continuous <Continuous>  HYDROmorphone  Injectable 2 milliGRAM(s) IV Push every 4 hours  ketamine Infusion 0.15 mG/kG/Hr IV Continuous <Continuous>  lacosamide IVPB 250 milliGRAM(s) IV Intermittent every 12 hours    Respiratory Medications  acetylcysteine 20%  Inhalation 4 milliLiter(s) Inhalation every 6 hours  albuterol/ipratropium for Nebulization 3 milliLiter(s) Nebulizer every 6 hours  sodium chloride 3%  Inhalation 4 milliLiter(s) Inhalation every 6 hours    Cardiovascular Medications  norepinephrine Infusion 0.05 MICROgram(s)/kG/Min IV Continuous <Continuous>    Gastrointestinal Medications  dextrose 5%. 1000 milliLiter(s) IV Continuous <Continuous>  pantoprazole  Injectable 40 milliGRAM(s) IV Push daily  Parenteral Nutrition - Adult 1 Each TPN Continuous <Continuous>  Parenteral Nutrition - Adult 1 Each TPN Continuous <Continuous>  sodium chloride 0.9% lock flush 10 milliLiter(s) IV Push every 1 hour PRN Pre/post blood products, medications, blood draw, and to maintain line patency    Genitourinary Medications    Hematologic/Oncologic Medications    Antimicrobial/Immunologic Medications  cefTRIAXone   IVPB 2000 milliGRAM(s) IV Intermittent every 24 hours    Endocrine/Metabolic Medications  dextrose 50% Injectable 25 Gram(s) IV Push once  dextrose Oral Gel 15 Gram(s) Oral once PRN Blood Glucose LESS THAN 70 milliGRAM(s)/deciliter  glucagon  Injectable 1 milliGRAM(s) IntraMuscular once  insulin lispro (ADMELOG) corrective regimen sliding scale   SubCutaneous every 6 hours    Topical/Other Medications  artificial  tears Solution 1 Drop(s) Both EYES two times a day  chlorhexidine 0.12% Liquid 15 milliLiter(s) Oral Mucosa every 12 hours  chlorhexidine 2% Cloths 1 Application(s) Topical daily      MEDICATIONS  (PRN):  dextrose Oral Gel 15 Gram(s) Oral once PRN Blood Glucose LESS THAN 70 milliGRAM(s)/deciliter  sodium chloride 0.9% lock flush 10 milliLiter(s) IV Push every 1 hour PRN Pre/post blood products, medications, blood draw, and to maintain line patency      I&O's Detail    2023 07:01  -  2023 07:00  --------------------------------------------------------  IN:    Dexmedetomidine: 416.5 mL    Enteral Tube Flush: 210 mL    Fat Emulsion (Fish Oil &amp; Plant Based) 20% Infusion: 249.6 mL    FentaNYL: 138.3 mL    IV PiggyBack: 75 mL    IV PiggyBack: 100 mL    IV PiggyBack: 300 mL    Ketamine: 52 mL    Nepro with Carb Steady: 440 mL    TPN (Total Parenteral Nutrition): 1540 mL  Total IN: 3521.4 mL    OUT:    Bulb (mL): 10 mL    Bulb (mL): 125 mL    Bulb (mL): 235 mL    Drain (mL): 90 mL    Drain (mL): 190 mL    Incontinent per Condom Catheter (mL): 600 mL    Indwelling Catheter - Urethral (mL): 350 mL    Nasogastric/Oral tube (mL): 100 mL    Other (mL): 252 mL    Other (mL): 0 mL  Total OUT: 1952 mL    Total NET: 1569.4 mL      2023 07:01  -  2023 14:30  --------------------------------------------------------  IN:    Dexmedetomidine: 85.9 mL    Enteral Tube Flush: 60 mL    Fat Emulsion (Fish Oil &amp; Plant Based) 20% Infusion: 20.8 mL    FentaNYL: 17.6 mL    FentaNYL: 7 mL    Jevity 1.2: 130 mL    Ketamine: 15.8 mL    Ketamine: 10.6 mL    TPN (Total Parenteral Nutrition): 420 mL  Total IN: 767.6 mL    OUT:    Bulb (mL): 50 mL    Bulb (mL): 15 mL    Bulb (mL): 20 mL    Drain (mL): 30 mL    Drain (mL): 25 mL    Incontinent per Condom Catheter (mL): 0 mL    Nasogastric/Oral tube (mL): 0 mL  Total OUT: 140 mL    Total NET: 627.6 mL          Vital Signs Last 24 Hrs  T(C): 37.6 (2023 12:00), Max: 38 (2023 21:52)  T(F): 99.7 (2023 12:00), Max: 100.4 (2023 21:52)  HR: 103 (2023 13:00) (80 - 131)  BP: --  BP(mean): --  RR: 18 (2023 13:00) (12 - 21)  SpO2: 100% (2023 13:00) (98% - 100%)    Parameters below as of 2023 13:00  Patient On (Oxygen Delivery Method): ventilator, CPAP    O2 Concentration (%): 40    GENERAL: Arousable. Appears comfortable. RASS -2.   HEENT: Normocephalic. Extraocular muscles are intact. PERRLA. Mucous membranes moist. Trachea midline. Trach 8.5, clean no drainage. Minimal thin secretions.   PULM: Right lower lobe rhonchi, diminished breath sounds in left. Respirations even and unlabored, no tachypnea. Moist productive cough noted.  C/V: Regular rate and rhythm. Normal capillary refill. S1, S2 present. Systolic murmur appreciated.    ABD: Soft, slight distension midline, tender to palpation. Bowel sounds present, hypoactive all four quadrants. Patent J tube, no drainage. L nare NGT with bilious green drainage. RLQ CODIE to self suction. LLQ CODIE x 2 self suction. Left IR x 2 drain to suction and gravity. Drainage sanguinous except hematoma drain with green, bilious output and hemidiaphragm drain with darker bilious output.   EXTREM: Warm, well perfused. Ansarca/generalized edema +4 noted. Positive peripheral pulses, radial and pedal +3.   SKIN: Midline abdominal incision with staples, clean dry and intact. Left knee abrasion. Sacral pressure injury, unstageable. Unstageable injury to L scapula.   NEURO: Arousable. RASS -2. Patient following basic commands, oriented to self.    LABS:                        7.1    8.83  )-----------( 84       ( 2023 05:29 )             22.5     -    135  |  100  |  34<H>  ----------------------------<  120<H>  3.9   |  27  |  0.80    Ca    7.7<L>      2023 05:24  Phos  3.7       Mg     2.1         TPro  5.4<L>  /  Alb  2.1<L>  /  TBili  0.8  /  DBili  0.4<H>  /  AST  17  /  ALT  11  /  AlkPhos  159<H>      PT/INR - ( 2023 05:24 )   PT: 16.4 sec;   INR: 1.37          PTT - ( 2023 05:24 )  PTT:36.2 sec  Urinalysis Basic - ( 2023 06:30 )    Color: Yellow / Appearance: Clear / S.020 / pH: x  Gluc: x / Ketone: NEGATIVE  / Bili: Small / Urobili: 0.2 E.U./dL   Blood: x / Protein: 100 mg/dL / Nitrite: NEGATIVE   Leuk Esterase: NEGATIVE / RBC: 5-10 /HPF / WBC < 5 /HPF   Sq Epi: x / Non Sq Epi: x / Bacteria: Present /HPF        RADIOLOGY & ADDITIONAL STUDIES:      Urinalysis with Rflx Culture (collected 23 @ 06:30)    Culture - Sputum (collected 23 @ 18:12)  Source: .Sputum Sputum  Gram Stain (23 @ 22:35):    Numerous epithelial cells    Moderate WBC's    Rare Yeast  Final Report (23 @ 22:35):    Sputum specimen rejected.  Microscopic examination indicates    oropharyngeal contamination.  Please repeat.    Culture - Blood (collected 23 @ 21:00)  Source: .Blood Blood-Peripheral  Preliminary Report (23 @ 22:00):    No growth at 1 day.    Culture - Blood (collected 23 @ 21:00)  Source: .Blood Blood-Peripheral  Preliminary Report (23 @ 22:00):    No growth at 1 day.    RADIOLOGY & ADDITIONAL STUDIES: Per official read: CT revealed complete atlectasis of left lower lobe, partial left upper and right lower. Ground glass opacities appreciated in right upper lobe, and ascites and encapsulated in abdomen stable.    U/A with no WBCs, nitrates. Sputum and blood cultures pending.

## 2023-06-08 NOTE — PROGRESS NOTE ADULT - ASSESSMENT
52 Y Male w/ HTN, aortic dissection previous admission with severe cardiogenic shock and oliguric TREY requiring CVVHD initiation 5/22. Presented to the ED 5/5 with worsening epigastric pain CTA/P revealed continued endoleak hospital course complicated by necrotic/hemorrhagic pancreatitis. Nephrology following for renal failure (initially oliguric, now non-oliguric since 6/5) but CVVHD/Dialysis dependent    #non-oliguric ATN 2/2 cardiogenic shock    Recommend:  Pt non-oliguric, 950ml of urine output over last 24 hours  Did not tolerate iHD 6/7 as became tachycardic and hypotensive. Ended up being net pos 1.5L/24 hours due to requiring more drips.  More hemodynamically stable this AM, off of pressors. HR 80s-90s on ketamine  Given overall vol overloaded, but stable electrolytes, will resume AQ today. Will set it at 60 ml/hr to target 1.5L UF in 24 hours  Maintain MAP > 70 for adequate renal perfusion  Strict i's and o's  Renally dose abx egfr <15  Bladder scan if UO decreases

## 2023-06-08 NOTE — PROGRESS NOTE ADULT - SUBJECTIVE AND OBJECTIVE BOX
Neurology Progress Note    Interval History:  Patient was seen and examined at bedside. Patient was resting comfortably and did not appear to be in distress. Patient opened his eyes to calling his name but could not follow complex commands or participate in ROS.      Medications:  acetaminophen   Oral Liquid .. 975 milliGRAM(s) Enteral Tube every 6 hours  acetylcysteine 20%  Inhalation 4 milliLiter(s) Inhalation every 6 hours  albuterol/ipratropium for Nebulization 3 milliLiter(s) Nebulizer every 6 hours  artificial  tears Solution 1 Drop(s) Both EYES two times a day  cefTRIAXone   IVPB 2000 milliGRAM(s) IV Intermittent every 24 hours  chlorhexidine 0.12% Liquid 15 milliLiter(s) Oral Mucosa every 12 hours  chlorhexidine 2% Cloths 1 Application(s) Topical daily  dexMEDEtomidine Infusion 1 MICROgram(s)/kG/Hr IV Continuous <Continuous>  dextrose 5%. 1000 milliLiter(s) IV Continuous <Continuous>  dextrose 50% Injectable 25 Gram(s) IV Push once  dextrose Oral Gel 15 Gram(s) Oral once PRN  fentaNYL   Infusion... 1 MICROgram(s)/kG/Hr IV Continuous <Continuous>  glucagon  Injectable 1 milliGRAM(s) IntraMuscular once  HYDROmorphone  Injectable 2 milliGRAM(s) IV Push every 4 hours  insulin lispro (ADMELOG) corrective regimen sliding scale   SubCutaneous every 6 hours  ketamine Infusion 0.15 mG/kG/Hr IV Continuous <Continuous>  lacosamide IVPB 250 milliGRAM(s) IV Intermittent every 12 hours  norepinephrine Infusion 0.05 MICROgram(s)/kG/Min IV Continuous <Continuous>  pantoprazole  Injectable 40 milliGRAM(s) IV Push daily  Parenteral Nutrition - Adult 1 Each TPN Continuous <Continuous>  Parenteral Nutrition - Adult 1 Each TPN Continuous <Continuous>  sodium chloride 0.9% lock flush 10 milliLiter(s) IV Push every 1 hour PRN  sodium chloride 3%  Inhalation 4 milliLiter(s) Inhalation every 6 hours      Vital Signs Last 24 Hrs  T(C): 37.6 (2023 12:00), Max: 38 (2023 21:52)  T(F): 99.7 (2023 12:00), Max: 100.4 (2023 21:52)  HR: 104 (2023 14:00) (80 - 131)  BP: --  BP(mean): --  RR: 11 (2023 14:00) (11 - 21)  SpO2: 100% (2023 14:00) (98% - 100%)    Parameters below as of 2023 14:00  Patient On (Oxygen Delivery Method): ventilator, CPAP    O2 Concentration (%): 40    Neurological Examination:  General:  Appearance is consistent with chronologic age.  No abnormal facies.  Gross skin survey within normal limits.    Cognitive/Language: UNABLE TO ACCESS  Cranial Nerves  - Eyes:  EOMI w/o nystagmus, skew or reported double vision.  PERRL.  No ptosis/weakness of eyelid closure.    - Face:  UNABLE TO ACESS   - Ears/Nose/Throat:  ongue  midline.   Motor examination:  Upper Extremities: L 1/5, R 1/5; Lower extremities: L 0/5, R 0/5.    Sensory examination: UNABLE TO ACCESS  Cerebellum:  UNABLE TO ACCESS    Labs:  CBC Full  -  ( 2023 05:29 )  WBC Count : 8.83 K/uL  RBC Count : 2.50 M/uL  Hemoglobin : 7.1 g/dL  Hematocrit : 22.5 %  Platelet Count - Automated : 84 K/uL  Mean Cell Volume : 90.0 fl  Mean Cell Hemoglobin : 28.4 pg  Mean Cell Hemoglobin Concentration : 31.6 gm/dL  Auto Neutrophil # : x  Auto Lymphocyte # : x  Auto Monocyte # : x  Auto Eosinophil # : x  Auto Basophil # : x  Auto Neutrophil % : x  Auto Lymphocyte % : x  Auto Monocyte % : x  Auto Eosinophil % : x  Auto Basophil % : x    06-08    135  |  100  |  34<H>  ----------------------------<  120<H>  3.9   |  27  |  0.80    Ca    7.7<L>      2023 05:24  Phos  3.7     06-08  Mg     2.1     06-08    TPro  5.4<L>  /  Alb  2.1<L>  /  TBili  0.8  /  DBili  0.4<H>  /  AST  17  /  ALT  11  /  AlkPhos  159<H>  06-08    LIVER FUNCTIONS - ( 2023 05:24 )  Alb: 2.1 g/dL / Pro: 5.4 g/dL / ALK PHOS: 159 U/L / ALT: 11 U/L / AST: 17 U/L / GGT: x           PT/INR - ( 2023 05:24 )   PT: 16.4 sec;   INR: 1.37          PTT - ( 2023 05:24 )  PTT:36.2 sec  Urinalysis Basic - ( 2023 06:30 )    Color: Yellow / Appearance: Clear / S.020 / pH: x  Gluc: x / Ketone: NEGATIVE  / Bili: Small / Urobili: 0.2 E.U./dL   Blood: x / Protein: 100 mg/dL / Nitrite: NEGATIVE   Leuk Esterase: NEGATIVE / RBC: 5-10 /HPF / WBC < 5 /HPF   Sq Epi: x / Non Sq Epi: x / Bacteria: Present /HPF

## 2023-06-08 NOTE — PROGRESS NOTE ADULT - ASSESSMENT
53-year-old male with PMHx of HTN, Type A aortic dissection s/p Dacron grafts, AV resuspension in 2013, CAD s/p CABG x 1 SVG to RCA (05/2013, Dr. Medina), seizure disorder (last episode on 07/04/2022) S/P replacement of transverse aortic arch, second stage thoracic endovascular aortic repair, aorto-axillary bypass, AV replacement (bio 23mm), in APr 2023 and CABG x 1 (SVG-RCA) EF 75% (Ignacio, 03/2020) now s/p 2nd state TEVAR (05/05/2023) prompting General Surgery consult for acute pancreatitis with large peripancreatic/perigastric fluid collections on CTA abdomen 05/08/2023 followed by acute hemorrhage starting 05/12/2023 requiring 11 U pRBC, but with negative mesenteric angiogram 05/13/2023 prompting Hepatobiliary Surgery reconsult for possible hemorrhagic pancreatitis, 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  Patient is s/p OR washout (05/31/2023), drains (RP, LUQ, pelvis) placement, and abthera wound vac. S/p percutaneous tracheostomy 06/05.    Plan:    -CT A/P read  -Wean off sedation  -EEG results  -F/u neurology recs  -F/u Pulm recs  -Monitor NGT output  -Continue TFs 20cc/hr  -Monitor drain output  -Continue bulb flushes and to gravity after flushing finished  -Rest of care per SICU  -Surgery team1 following

## 2023-06-08 NOTE — PROGRESS NOTE ADULT - SUBJECTIVE AND OBJECTIVE BOX
Patient is a 53y Male seen and evaluated at bedside. Remains on vent via trache. Not on any pressors. Did not tolerate iHD well yesterday, became hypotensive and tachycardic. HR now improved on ketamine. Off of pressors. CVP 10 this AM. Pt net pos 1.5L/24 hours      Meds:    acetaminophen   Oral Liquid .. 975 every 6 hours  acetylcysteine 20%  Inhalation 4 every 6 hours  albuterol/ipratropium for Nebulization 3 every 6 hours  artificial  tears Solution 1 two times a day  cefTRIAXone   IVPB 2000 every 24 hours  chlorhexidine 0.12% Liquid 15 every 12 hours  chlorhexidine 2% Cloths 1 daily  dexMEDEtomidine Infusion 1 <Continuous>  dextrose 5%. 1000 <Continuous>  dextrose 50% Injectable 25 once  dextrose Oral Gel 15 once PRN  fentaNYL   Infusion... 1 <Continuous>  glucagon  Injectable 1 once  HYDROmorphone  Injectable 2 every 4 hours  insulin lispro (ADMELOG) corrective regimen sliding scale  every 6 hours  ketamine Infusion 0.15 <Continuous>  lacosamide IVPB 250 every 12 hours  norepinephrine Infusion 0.05 <Continuous>  pantoprazole  Injectable 40 daily  Parenteral Nutrition - Adult 1 <Continuous>  Parenteral Nutrition - Adult 1 <Continuous>  sodium chloride 0.9% lock flush 10 every 1 hour PRN  sodium chloride 3%  Inhalation 4 every 6 hours      T(C): , Max: 38 (23 @ 21:52)  T(F): , Max: 100.4 (23 @ 21:52)  HR: 109 (23 @ 12:00)  BP: --  BP(mean): --  RR: 19 (23 @ 12:00)  SpO2: 100% (23 @ 12:00)  Wt(kg): --     @ 07:01  -   @ 07:00  --------------------------------------------------------  IN: 3521.4 mL / OUT: 1952 mL / NET: 1569.4 mL     07:01  -   @ 12:22  --------------------------------------------------------  IN: 555.4 mL / OUT: 140 mL / NET: 415.4 mL          Review of Systems:  ROS negative except as per HPI      PHYSICAL EXAM:  GENERAL: intubated and sedated  CHEST/LUNG: mechanical breath sounds  HEART: normal S1S2, RRR  ABDOMEN: Soft, Nontender, +BS,   EXTREMITIES: +2 edema BL LE UE   ACCESS: L subclavian HD cath (placed )      LABS:                        7.1    8.83  )-----------( 84       ( 2023 05:29 )             22.5     06-08    135  |  100  |  34<H>  ----------------------------<  120<H>  3.9   |  27  |  0.80    Ca    7.7<L>      2023 05:24  Phos  3.7     06-08  Mg     2.1     06-08    TPro  5.4<L>  /  Alb  2.1<L>  /  TBili  0.8  /  DBili  0.4<H>  /  AST  17  /  ALT  11  /  AlkPhos  159<H>  -08    Hepatitis B Surface Antibody: Reactive *!* (07 @ 13:55)    PT/INR - ( 2023 05:24 )   PT: 16.4 sec;   INR: 1.37          PTT - ( 2023 05:24 )  PTT:36.2 sec  Urinalysis Basic - ( 2023 06:30 )    Color: Yellow / Appearance: Clear / S.020 / pH: x  Gluc: x / Ketone: NEGATIVE  / Bili: Small / Urobili: 0.2 E.U./dL   Blood: x / Protein: 100 mg/dL / Nitrite: NEGATIVE   Leuk Esterase: NEGATIVE / RBC: 5-10 /HPF / WBC < 5 /HPF   Sq Epi: x / Non Sq Epi: x / Bacteria: Present /HPF            RADIOLOGY & ADDITIONAL STUDIES:

## 2023-06-08 NOTE — PROGRESS NOTE ADULT - SUBJECTIVE AND OBJECTIVE BOX
STATUS POST:       SUBJECTIVE: Patient seen and examined bedside by chief resident.    cefTRIAXone   IVPB 2000 milliGRAM(s) IV Intermittent every 24 hours  norepinephrine Infusion 0.05 MICROgram(s)/kG/Min IV Continuous <Continuous>      Vital Signs Last 24 Hrs  T(C): 37.9 (08 Jun 2023 03:00), Max: 38 (07 Jun 2023 09:00)  T(F): 100.2 (08 Jun 2023 03:00), Max: 100.4 (07 Jun 2023 09:00)  HR: 107 (08 Jun 2023 05:57) (86 - 137)  BP: --  BP(mean): --  RR: 15 (08 Jun 2023 05:57) (12 - 23)  SpO2: 100% (08 Jun 2023 05:57) (95% - 100%)    Parameters below as of 08 Jun 2023 05:57  Patient On (Oxygen Delivery Method): ventilator    O2 Concentration (%): 40  I&O's Detail    06 Jun 2023 07:01  -  07 Jun 2023 07:00  --------------------------------------------------------  IN:    Dexmedetomidine: 346 mL    Enteral Tube Flush: 10 mL    Fat Emulsion (Fish Oil &amp; Plant Based) 20% Infusion: 270.4 mL    FentaNYL: 14 mL    FentaNYL: 101.3 mL    IV PiggyBack: 200 mL    IV PiggyBack: 50 mL    Nepro with Carb Steady: 480 mL    TPN (Total Parenteral Nutrition): 1680 mL  Total IN: 3151.7 mL    OUT:    Bulb (mL): 58 mL    Bulb (mL): 385 mL    Bulb (mL): 160 mL    Drain (mL): 105 mL    Drain (mL): 230 mL    Incontinent per Condom Catheter (mL): 865 mL    Nasogastric/Oral tube (mL): 300 mL    Norepinephrine: 0 mL    Other (mL): 667 mL    Other (mL): 1081 mL  Total OUT: 3851 mL    Total NET: -699.3 mL      07 Jun 2023 07:01  -  08 Jun 2023 06:28  --------------------------------------------------------  IN:    Dexmedetomidine: 381.5 mL    Enteral Tube Flush: 180 mL    Fat Emulsion (Fish Oil &amp; Plant Based) 20% Infusion: 208 mL    FentaNYL: 127.8 mL    IV PiggyBack: 75 mL    IV PiggyBack: 100 mL    IV PiggyBack: 300 mL    Ketamine: 44.4 mL    Nepro with Carb Steady: 400 mL    TPN (Total Parenteral Nutrition): 1400 mL  Total IN: 3216.7 mL    OUT:    Bulb (mL): 10 mL    Bulb (mL): 95 mL    Bulb (mL): 145 mL    Drain (mL): 155 mL    Incontinent per Condom Catheter (mL): 600 mL    Other (mL): 252 mL    Other (mL): 0 mL  Total OUT: 1257 mL    Total NET: 1959.7 mL          General: NAD, resting comfortably in bed  C/V: NSR  Pulm: Nonlabored breathing, no respiratory distress  Abd: soft, NT/ND.  Extrem: WWP, no edema, SCDs in place        LABS:                        7.1    8.83  )-----------( 84       ( 08 Jun 2023 05:29 )             22.5     06-08    135  |  100  |  34<H>  ----------------------------<  120<H>  3.9   |  27  |  0.80    Ca    7.7<L>      08 Jun 2023 05:24  Phos  3.7     06-08  Mg     2.1     06-08    TPro  5.4<L>  /  Alb  2.1<L>  /  TBili  0.8  /  DBili  0.4<H>  /  AST  17  /  ALT  11  /  AlkPhos  159<H>  06-08    PT/INR - ( 08 Jun 2023 05:24 )   PT: 16.4 sec;   INR: 1.37          PTT - ( 08 Jun 2023 05:24 )  PTT:36.2 sec      RADIOLOGY & ADDITIONAL STUDIES:   ON: CT chest/and/pelvis ___. Transient hypotension @2300. Afebrile. STAT lactate sent. Hypotension improved with weaning of precedex. A-line replaced.   6/7: Back to AC for tachypnea, CTCAP ordered, dilaudid 4mg for evidence of pain, PO contrast given, reordered TPN no changes, moved rooms for HD access to plumbing, Hypotensive and tachycardic with UF- just clearance. Levo started for hypotension on dialysis, added ketamine gtts for pain. called for seizure activity on EEG     SUBJECTIVE: Patient seen and examined bedside; more awake today, nodding head.    cefTRIAXone   IVPB 2000 milliGRAM(s) IV Intermittent every 24 hours  norepinephrine Infusion 0.05 MICROgram(s)/kG/Min IV Continuous <Continuous>      Vital Signs Last 24 Hrs  T(C): 37.9 (08 Jun 2023 03:00), Max: 38 (07 Jun 2023 09:00)  T(F): 100.2 (08 Jun 2023 03:00), Max: 100.4 (07 Jun 2023 09:00)  HR: 107 (08 Jun 2023 05:57) (86 - 137)  BP: --  BP(mean): --  RR: 15 (08 Jun 2023 05:57) (12 - 23)  SpO2: 100% (08 Jun 2023 05:57) (95% - 100%)    Parameters below as of 08 Jun 2023 05:57  Patient On (Oxygen Delivery Method): ventilator    O2 Concentration (%): 40  I&O's Detail    06 Jun 2023 07:01  -  07 Jun 2023 07:00  --------------------------------------------------------  IN:    Dexmedetomidine: 346 mL    Enteral Tube Flush: 10 mL    Fat Emulsion (Fish Oil &amp; Plant Based) 20% Infusion: 270.4 mL    FentaNYL: 14 mL    FentaNYL: 101.3 mL    IV PiggyBack: 200 mL    IV PiggyBack: 50 mL    Nepro with Carb Steady: 480 mL    TPN (Total Parenteral Nutrition): 1680 mL  Total IN: 3151.7 mL    OUT:    Bulb (mL): 58 mL    Bulb (mL): 385 mL    Bulb (mL): 160 mL    Drain (mL): 105 mL    Drain (mL): 230 mL    Incontinent per Condom Catheter (mL): 865 mL    Nasogastric/Oral tube (mL): 300 mL    Norepinephrine: 0 mL    Other (mL): 667 mL    Other (mL): 1081 mL  Total OUT: 3851 mL    Total NET: -699.3 mL      07 Jun 2023 07:01  -  08 Jun 2023 06:28  --------------------------------------------------------  IN:    Dexmedetomidine: 381.5 mL    Enteral Tube Flush: 180 mL    Fat Emulsion (Fish Oil &amp; Plant Based) 20% Infusion: 208 mL    FentaNYL: 127.8 mL    IV PiggyBack: 75 mL    IV PiggyBack: 100 mL    IV PiggyBack: 300 mL    Ketamine: 44.4 mL    Nepro with Carb Steady: 400 mL    TPN (Total Parenteral Nutrition): 1400 mL  Total IN: 3216.7 mL    OUT:    Bulb (mL): 10 mL    Bulb (mL): 95 mL    Bulb (mL): 145 mL    Drain (mL): 155 mL    Incontinent per Condom Catheter (mL): 600 mL    Other (mL): 252 mL    Other (mL): 0 mL  Total OUT: 1257 mL    Total NET: 1959.7 mL      PE:    General: NAD, resting comfortably in bed  C/V: NSR, s1 s2  Pulm: Trach to vent, nonlabored breathing, no respiratory distress  Abd: Soft, NTND; all drains serosanguinous, except the pancreatic drain which is bilious  Extrem: [facility]        LABS:                        7.1    8.83  )-----------( 84       ( 08 Jun 2023 05:29 )             22.5     06-08    135  |  100  |  34<H>  ----------------------------<  120<H>  3.9   |  27  |  0.80    Ca    7.7<L>      08 Jun 2023 05:24  Phos  3.7     06-08  Mg     2.1     06-08    TPro  5.4<L>  /  Alb  2.1<L>  /  TBili  0.8  /  DBili  0.4<H>  /  AST  17  /  ALT  11  /  AlkPhos  159<H>  06-08    PT/INR - ( 08 Jun 2023 05:24 )   PT: 16.4 sec;   INR: 1.37          PTT - ( 08 Jun 2023 05:24 )  PTT:36.2 sec      RADIOLOGY & ADDITIONAL STUDIES:

## 2023-06-08 NOTE — EEG REPORT - NS EEG TEXT BOX
Daily Updates (from 07:00 am until 07:00 am):  Day 2  6/7-6/8/2023:   Background:  continuous, with predominantly theta>delta frequencies.  Symmetry:  No persistent asymmetries of voltage or frequency.  Posterior Dominant Rhythm:  7 Hz rudimentary.  Organization: Absent.  Voltage:  Normal (20+ uV)  Variability: Yes. 		Reactivity: Yes.  N2 sleep: Absent.  Spontaneous Activity:  No epileptiform discharges.  Occasional brief frontally predominant blunted waves in a triphasic morphology  Periodic/rhythmic activity:  None  Events:  No electrographic seizures or significant clinical events.  Provocations:  Hyperventilation and Photic stimulation: was not performed.    Daily Summary:    Continuous Moderate generalized   slowing suggestive of a Moderate degree of diffuse or multifocal  dysfunction.    There were no epileptiform abnormalities or  electrographic seizures       Dary Flores MD  Attending Neurologist, [facility]         VOMITING/ABDOMINAL PAIN

## 2023-06-08 NOTE — ADVANCED PRACTICE NURSE CONSULT - RECOMMEDATIONS
Sacral/coccyx, left thoracic, left lower back, left ear pressure injuries - cleanse with normal saline, apply Triad cream, cover with foam dressing every other day and prn if soiled.   Skin tears on left antecubital - cleanse with normal saline, apply Triad cream, cover with Telfa dressing every other day.   WOCN discussed assessment with RN and SYLVIA RODRIGUES.  Sacral/coccyx, left thoracic, left ear pressure injuries - cleanse with normal saline, apply Triad cream, cover with foam dressing every other day and prn if soiled.   Skin tears on left antecubital - cleanse with normal saline, apply Triad cream, cover with Telfa dressing every other day.   KALLIE discussed assessment with RN and SYLVIA RODRIGUES.

## 2023-06-08 NOTE — PROGRESS NOTE ADULT - SUBJECTIVE AND OBJECTIVE BOX
SUBJECTIVE: Patient seen and evaluated. More alert today. Pain intermittently controlled        MEDICATIONS  (STANDING):  acetaminophen   Oral Liquid .. 975 milliGRAM(s) Enteral Tube every 6 hours  acetylcysteine 20%  Inhalation 4 milliLiter(s) Inhalation every 6 hours  albuterol/ipratropium for Nebulization 3 milliLiter(s) Nebulizer every 6 hours  artificial  tears Solution 1 Drop(s) Both EYES two times a day  cefTRIAXone   IVPB 2000 milliGRAM(s) IV Intermittent every 24 hours  chlorhexidine 0.12% Liquid 15 milliLiter(s) Oral Mucosa every 12 hours  chlorhexidine 2% Cloths 1 Application(s) Topical daily  dexMEDEtomidine Infusion 1 MICROgram(s)/kG/Hr (17.5 mL/Hr) IV Continuous <Continuous>  dextrose 5%. 1000 milliLiter(s) (100 mL/Hr) IV Continuous <Continuous>  dextrose 50% Injectable 25 Gram(s) IV Push once  fentaNYL   Infusion... 1 MICROgram(s)/kG/Hr (3.5 mL/Hr) IV Continuous <Continuous>  glucagon  Injectable 1 milliGRAM(s) IntraMuscular once  HYDROmorphone  Injectable 2 milliGRAM(s) IV Push every 4 hours  insulin lispro (ADMELOG) corrective regimen sliding scale   SubCutaneous every 6 hours  ketamine Infusion 0.15 mG/kG/Hr (5.25 mL/Hr) IV Continuous <Continuous>  lacosamide IVPB 250 milliGRAM(s) IV Intermittent every 12 hours  norepinephrine Infusion 0.05 MICROgram(s)/kG/Min (6.56 mL/Hr) IV Continuous <Continuous>  pantoprazole  Injectable 40 milliGRAM(s) IV Push daily  Parenteral Nutrition - Adult 1 Each (70 mL/Hr) TPN Continuous <Continuous>  Parenteral Nutrition - Adult 1 Each (70 mL/Hr) TPN Continuous <Continuous>  sodium chloride 3%  Inhalation 4 milliLiter(s) Inhalation every 6 hours    MEDICATIONS  (PRN):  dextrose Oral Gel 15 Gram(s) Oral once PRN Blood Glucose LESS THAN 70 milliGRAM(s)/deciliter  sodium chloride 0.9% lock flush 10 milliLiter(s) IV Push every 1 hour PRN Pre/post blood products, medications, blood draw, and to maintain line patency      Vital Signs Last 24 Hrs  T(C): 37.6 (2023 12:00), Max: 38 (2023 21:52)  T(F): 99.7 (2023 12:00), Max: 100.4 (2023 21:52)  HR: 103 (2023 13:00) (80 - 131)  BP: --  BP(mean): --  RR: 18 (2023 13:00) (12 - 21)  SpO2: 100% (2023 13:00) (98% - 100%)    Parameters below as of 2023 13:00  Patient On (Oxygen Delivery Method): ventilator, CPAP    O2 Concentration (%): 40    Physical Exam:  GENERAL: Arousable. Appears more comfortable.  HEENT:  Trach in place. LEFT SC line, TLC  PULM: On CPAP, decrease BS in left base, left apex  C/V: Tachycardic, normal capillary refill. S1, S2 present. Systolic murmur appreciated.    ABD: Soft, distended grimace to palpation, lateral to previous exams, drains x4 in place, pancreatic and hematoma drain bilious, remainder SS.   EXTREM: Warm,  Ansarca/generalized edema +4 noted. Positive peripheral pulses, radial and pedal +3. SCDs.  SKIN: Midline abdominal incision with staples, clean dry and intact.      I&O's Summary    2023 07:01  -  2023 07:00  --------------------------------------------------------  IN: 3521.4 mL / OUT: 1952 mL / NET: 1569.4 mL    2023 07:01  -  2023 13:37  --------------------------------------------------------  IN: 767.6 mL / OUT: 140 mL / NET: 627.6 mL        LABS:                        7.1    8.83  )-----------( 84       ( 2023 05:29 )             22.5     06-08    135  |  100  |  34<H>  ----------------------------<  120<H>  3.9   |  27  |  0.80    Ca    7.7<L>      2023 05:24  Phos  3.7     06-08  Mg     2.1     06-08    TPro  5.4<L>  /  Alb  2.1<L>  /  TBili  0.8  /  DBili  0.4<H>  /  AST  17  /  ALT  11  /  AlkPhos  159<H>  06-08    PT/INR - ( 2023 05:24 )   PT: 16.4 sec;   INR: 1.37          PTT - ( 2023 05:24 )  PTT:36.2 sec  Urinalysis Basic - ( 2023 06:30 )    Color: Yellow / Appearance: Clear / S.020 / pH: x  Gluc: x / Ketone: NEGATIVE  / Bili: Small / Urobili: 0.2 E.U./dL   Blood: x / Protein: 100 mg/dL / Nitrite: NEGATIVE   Leuk Esterase: NEGATIVE / RBC: 5-10 /HPF / WBC < 5 /HPF   Sq Epi: x / Non Sq Epi: x / Bacteria: Present /HPF      CAPILLARY BLOOD GLUCOSE      POCT Blood Glucose.: 127 mg/dL (2023 11:38)    LIVER FUNCTIONS - ( 2023 05:24 )  Alb: 2.1 g/dL / Pro: 5.4 g/dL / ALK PHOS: 159 U/L / ALT: 11 U/L / AST: 17 U/L / GGT: x             RADIOLOGY & ADDITIONAL STUDIES:

## 2023-06-08 NOTE — PROGRESS NOTE ADULT - ASSESSMENT
NEURO: Wean sedation/Precedex gtt for RASS -1/0 as tolerated. Wean Fentanyl gtt (1 mcg/kg/hr) and increase Ketamine gtt to 0.15 mcg/kg/hr. Hydromorphone 2mg IVP q 4 hours prn for additional pain management. Continue 250mg Lacosamide IV. Neuro checks per protocol and prn. Continue EEG due to transition, discontinuation of Depakote.   HEENT: Artificial tears as needed.  CV: Maintain MAP > 65 - not on pressors. Monitor H/H, acute signs of bleed.   PULM: Tolerating CPAP 5/5 40% (4 hours). Continue vent weaning. AC 40/500/12/5 as needed. Resend sputum culture. Pulmonary toileting. Continue Duonebs, Mucomyst, and 3% Saline neb q 6 hours.   GI/FEN: Continue TPN @ 70cc/hr. Continue TF - Jevity @ 30 cc/hr. NGT discontinued. Monitor drain output. Continue Protonix 40mg BID.  : TREY. CVVHD not tolerated yesterady. Aquaphoresis ordered, goal net negative. Continue to monitor U/O.  HEME: H/H and Platelets stable. Continue to monitor. Maintain current T&S. Hold subcu hepatin.  ENDO: Sliding scale per TPN protocol.  ID: Continue 2g Ceftriaxone OD for Kleb bacteremia. No growth in cultures. Resend sputum.   PPX: SCDs.   LINES: R radial (6/7), Left subclavian HD Cath (5/31), RIJ TLC (6/1) LIJ TLC (5/23)  WOUNDS/DRAINS: Right OR CODIE, Left OR CODIE, left IR hematoma drain, left IR pancreatic drain. Continue to monitor output and flush IR/panc drain q shift 250cc.   PT: Deferred today.  Dispo: SICU   Plan  NEURO: Wean sedation/Precedex gtt for RASS -1/0 as tolerated. Wean Fentanyl gtt (1 mcg/kg/hr) and increase Ketamine gtt to 0.15 mcg/kg/hr. Hydromorphone 2mg IVP q 4 hours prn for additional pain management. Continue 250mg Lacosamide IV. Neuro checks per protocol and prn. Continue EEG with transition, discontinuation of Depakote.   HEENT: Artificial tears as needed.  CV: Maintain MAP > 65 - not on pressors. Monitor H/H, acute signs of bleed.   PULM: Tolerating CPAP 5/5 40% (4 hours). Continue vent weaning. AC 40/500/12/5 as needed. Resend sputum culture. Pulmonary toileting. Continue Duonebs, Mucomyst, and 3% Saline neb q 6 hours.   GI/FEN: Continue TPN @ 70cc/hr. Increase TF - Jevity @ 30 cc/hr. NGT discontinued. Monitor drain output. Continue Protonix 40mg BID.  : TREY. CVVHD not tolerated yesterady. Aquaphoresis ordered, goal net negative. Continue to monitor U/O.  HEME: H/H and Platelets stable. Continue to monitor. Maintain current T&S. Hold subcu hepatin.  ENDO: Sliding scale per TPN protocol.  ID: Continue 2g Ceftriaxone OD for Kleb bacteremia. No growth in cultures. Resend sputum culture.  PPX: SCDs.  LINES: R radial (6/7), Left subclavian HD Cath (5/31), RIJ TLC (6/1) LIJ TLC (5/23)WOUNDS/DRAINS: Right OR CODIE, Left OR CODIE, left IR hematoma drain, left IR pancreatic drain. Continue to monitor output and flush IR/panc drain q shift 250cc.   PT: OOB to chair.  Dispo: SICU   Plan  NEURO: Wean sedation/Precedex gtt for RASS -1/0 as tolerated. Wean Fentanyl gtt (1 mcg/kg/hr) and increase Ketamine gtt to 0.15 mcg/kg/hr. Hydromorphone 2mg IVP q 4 hours prn for additional pain management. --- Dose subsequently increased to 3mg IVP q 4 hours. Continue 250mg Lacosamide IV. Neuro checks per protocol and prn. Continue EEG with transition, discontinuation of Depakote.   HEENT: Artificial tears as needed.  CV: Maintain MAP > 65 - not on pressors. Monitor H/H, acute signs of bleed.   PULM: Tolerating CPAP 5/5 40% (4 hours). Continue vent weaning overnight. AC 40/500/12/5 as needed. Resend sputum culture. Pulmonary toileting. Continue Duonebs, Mucomyst, and 3% Saline neb q 6 hours.   GI/FEN: Continue TPN @ 70cc/hr. Increase TF - Jevity @ 30 cc/hr. NGT discontinued. Monitor drain output. Continue Protonix 40mg BID.  : TREY. CVVHD not tolerated yesterady. Aquaphoresis ordered, goal net negative. Continue to monitor U/O.  HEME: H/H and Platelets stable. Continue to monitor. Maintain current T&S. Resume subcu Heparin. Start ASA tomorrow.  ENDO: Sliding scale per TPN protocol.  ID: Continue 2g Ceftriaxone OD for Kleb bacteremia. No growth in cultures. Resend sputum culture.  PPX: SCDs.  LINES: R radial (6/7), Left subclavian HD Cath (5/31), RIJ TLC (6/1) LIJ TLC (5/23)WOUNDS/DRAINS: Right OR CODIE, Left OR CODIE, left IR hematoma drain, left IR pancreatic drain. Continue to monitor output and flush IR/panc drain q shift 250cc.   PT: OOB to chair.  Dispo: SICU   53 year old male with PMH of HTN, Type A Aortic dissection s/p Dacron grafts, AV resuspension (2013), CAD s/p CABG x 1 SVG to RCA (2013, Dr. Medina), seizure disorder, with recent admit (Mar/Apr) for replacement of transverse aortic arch, second stage TEVAR and aoto-axillary bypass, AV replacement, and CABG x1. Post op course c/b shock, TREY requiring CVVHD, dialysis. Readmitted on 4/27 ([facility] ED) for syncope at home where imaging revealed graft endoleak and pancreatitis. Patient taken back to OR 5/5 for TEVAR, with post op course now complicated by Klebseilla bacteremia (5/15), respiratory failure requiring intubation (5/18), and bronch and PEA arrest (5/19), with respiratory cultures revealing Enterobacter, E. Faecalis and Strep angionosus. Surgery reconsulted re: active hemorrhagic pancreatitis on CT (5/13), with subsequent IR aspiration of peripancreatic fluid collection (15cc, Kleb) and paracentesis - 4L (5/22). Patient started on CVVHD 5/22. Transferred to SICU (5/25), with catheter exchange and two additional drains placed on 5/26 in IR and exlap, wash out and new drain placement on 5/31. The patient returned to the OR 6/1 for evacuation of blood and placement of J tube. Patient failed extubation on 6/3 and received trach on 6/5. Noted to be febrile on 6/7 and 6/8.      Plan  NEURO: Wean sedation/Precedex gtt for RASS -1/0 as tolerated. Wean Fentanyl gtt (1 mcg/kg/hr) and increase Ketamine gtt to 0.15 mcg/kg/hr. Hydromorphone 2mg IVP q 4 hours prn for additional pain management. --- Dose subsequently increased to 3mg IVP q 4 hours. Continue 250mg Lacosamide IV. Neuro checks per protocol and prn. Continue EEG with transition, discontinuation of Depakote.   HEENT: Artificial tears as needed.  CV: Maintain MAP > 65 - not on pressors. Monitor H/H, acute signs of bleed.   PULM: Tolerating CPAP 5/5 40% (4 hours). Continue vent weaning overnight. AC 40/500/12/5 as needed. Resend sputum culture. Pulmonary toileting. Continue Duonebs, Mucomyst, and 3% Saline neb q 6 hours.   GI/FEN: Continue TPN @ 70cc/hr. Increase TF - Jevity @ 30 cc/hr. NGT discontinued. Monitor drain output. Continue Protonix 40mg BID.  : TREY. CVVHD not tolerated yesterady. Aquaphoresis ordered, goal net negative. Continue to monitor U/O.  HEME: H/H and Platelets stable. Continue to monitor. Maintain current T&S. Resume subcu Heparin. Start ASA tomorrow.  ENDO: Sliding scale per TPN protocol.  ID: Continue 2g Ceftriaxone OD for Kleb bacteremia. No growth in cultures. Resend sputum culture.  PPX: SCDs.  LINES: R radial (6/7), Left subclavian HD Cath (5/31), RIJ TLC (6/1) LIJ TLC (5/23)WOUNDS/DRAINS: Right OR CODIE, Left OR CODIE, left IR hematoma drain, left IR pancreatic drain. Continue to monitor output and flush IR/panc drain q shift 250cc.   PT: OOB to chair.  Dispo: SICU   53 year old male with PMH of HTN, Type A Aortic dissection s/p Dacron grafts, AV resuspension (2013), CAD s/p CABG x 1 SVG to RCA (2013, Dr. Medina), seizure disorder, with recent admit (Mar/Apr) for replacement of transverse aortic arch, second stage TEVAR and aoto-axillary bypass, AV replacement, and CABG x1. Post op course c/b shock, TREY requiring CVVHD, dialysis. Readmitted on 4/27 ([facility] ED) for syncope at home where imaging revealed graft endoleak and pancreatitis. Patient taken back to OR 5/5 for TEVAR, with post op course now complicated by Klebseilla bacteremia (5/15), respiratory failure requiring intubation (5/18), and bronch and PEA arrest (5/19), with respiratory cultures revealing Enterobacter, E. Faecalis and Strep angionosus. Surgery reconsulted re: active hemorrhagic pancreatitis on CT (5/13), with subsequent IR aspiration of peripancreatic fluid collection (15cc, Kleb) and paracentesis - 4L (5/22). Patient started on CVVHD 5/22. Transferred to SICU (5/25), with catheter exchange and two additional drains placed on 5/26 in IR and exlap, wash out and new drain placement on 5/31. The patient returned to the OR 6/1 for evacuation of blood and placement of J tube. Patient failed extubation on 6/3 and received trach on 6/5. Noted to be febrile on 6/7 and 6/8.      Plan  NEURO: Wean sedation/Precedex gtt for RASS -1/0 as tolerated. Wean Fentanyl gtt (1 mcg/kg/hr) and increase Ketamine gtt to 0.15 mcg/kg/hr. Hydromorphone 2mg IVP q 4 hours prn for additional pain management. --- Dose subsequently increased to 3mg IVP q 4 hours. Continue 250mg Lacosamide IV. Neuro checks per protocol and prn. Continue EEG with transition, discontinuation of Depakote.   HEENT: Artificial tears as needed.  CV: Maintain MAP > 65 - not on pressors. Monitor H/H, acute signs of bleed.   PULM: Tolerating CPAP 5/5 40% (4 hours). Continue vent weaning overnight. AC 40/500/12/5 as needed. Resend sputum culture. Pulmonary toileting. Continue Duonebs, Mucomyst, and 3% Saline neb q 6 hours.   GI/FEN: Continue TPN @ 70cc/hr. Increase TF - Jevity @ 30 cc/hr. NGT discontinued. Monitor drain output. Continue Protonix 40mg BID.  : TREY. CVVHD not tolerated yesterady. Aquaphoresis ordered, goal net negative. Continue to monitor U/O.  HEME: H/H and Platelets stable. Continue to monitor. Maintain current T&S. Resume subcu Heparin. Start ASA tomorrow.  ENDO: Sliding scale per TPN protocol.  ID: Continue 2g Ceftriaxone OD for Kleb bacteremia. No growth in cultures. Resend sputum culture.  PPX: SCDs.  LINES: R radial (6/7), Left subclavian HD Cath (5/31), RIJ TLC (6/1) LIJ TLC (5/23)  WOUNDS/DRAINS: Right OR CODIE, Left OR CODIE, left IR hematoma drain, left IR pancreatic drain. Continue to monitor output and flush IR/panc drain q shift 250cc.   PT: OOB to chair.  Dispo: SICU   53 year old male with PMH of HTN, Type A Aortic dissection s/p Dacron grafts, AV resuspension (2013), CAD s/p CABG x 1 SVG to RCA (2013, Dr. Medina), seizure disorder, with recent admit (Mar/Apr) for replacement of transverse aortic arch, second stage TEVAR and aoto-axillary bypass, AV replacement, and CABG x1. Post op course c/b shock, TREY requiring CVVHD, dialysis. Readmitted on 4/27 ([facility] ED) for syncope at home where imaging revealed graft endoleak and pancreatitis. Patient taken back to OR 5/5 for TEVAR, with post op course now complicated by Klebseilla bacteremia (5/15), respiratory failure requiring intubation (5/18), and bronch and PEA arrest (5/19), with respiratory cultures revealing Enterobacter, E. Faecalis and Strep angionosus. Surgery reconsulted re: active hemorrhagic pancreatitis on CT (5/13), with subsequent IR aspiration of peripancreatic fluid collection (15cc, Kleb) and paracentesis - 4L (5/22). Patient started on CVVHD 5/22. Transferred to SICU (5/25), with catheter exchange and two additional drains placed on 5/26 in IR and exlap, wash out and new drain placement on 5/31. The patient returned to the OR 6/1 for evacuation of blood and placement of J tube. Patient failed extubation on 6/3 and received trach on 6/5. Noted to be febrile on 6/7 and 6/8. CT Ch/Ab/Pelvis overnight 6/7 with stable abdominal collections and atelectasis of LLL.       Plan  NEURO: Wean sedation/Precedex gtt for RASS -1/0 as tolerated. Wean Fentanyl gtt (1 mcg/kg/hr) and increase Ketamine gtt to 0.15 mcg/kg/hr. Hydromorphone 2mg IVP q 4 hours prn for additional pain management. --- Dose subsequently increased to 3mg IVP q 4 hours. Continue 250mg Lacosamide IV. Neuro checks per protocol and prn. Continue EEG with transition, discontinuation of Depakote.   HEENT: Artificial tears as needed.  CV: Maintain MAP > 65 - not on pressors. Monitor H/H, acute signs of bleed.   PULM: Tolerating CPAP 5/5 40% (4 hours). Continue vent weaning overnight. AC 40/500/12/5 as needed. Resend sputum culture. Pulmonary toileting. Continue Duonebs, Mucomyst, and 3% Saline neb q 6 hours.   GI/FEN: Continue TPN @ 70cc/hr. Increase TF - Jevity @ 30 cc/hr. NGT discontinued. Monitor drain output. Continue Protonix 40mg BID.  : TREY. CVVHD not tolerated yesterady. Aquaphoresis ordered, goal net negative. Continue to monitor U/O.  HEME: H/H and Platelets stable. Continue to monitor. Maintain current T&S. Resume subcu Heparin. Start ASA tomorrow.  ENDO: Sliding scale per TPN protocol.  ID: Continue 2g Ceftriaxone OD for Kleb bacteremia. No growth in cultures. Resend sputum culture.  PPX: SCDs.  LINES: R radial (6/7), Left subclavian HD Cath (5/31), RIJ TLC (6/1) LIJ TLC (5/23)  WOUNDS/DRAINS: Right OR CODIE, Left OR CODIE, left IR hematoma drain, left IR pancreatic drain. Continue to monitor output and flush IR/panc drain q shift 250cc.   PT: OOB to chair.  Dispo: SICU

## 2023-06-08 NOTE — PROGRESS NOTE ADULT - ASSESSMENT
52yo Male pt with PMH HTN, type A aortic dissection s/p Dacron grafts, AV resuspension in 2013, CAD s/p CABG x 1 SVG to RCA (5/2013 with Dr. Medina), seizure disorder (last episode on 7/4/22) S/P replacement of transverse aortic arch, second stage thoracic endovascular aortic repair, aorto-axillary bypass, AV replacement (bio 23mm), in APr 2023 and CABG x 1 (SVG-RCA) EF 75% (Ignacio, 3/20) now s/p 2nd state TEVAR on 5/5 for whom surgery was consulted for finding of acute pancreatitis with large peripancreatic/perigastric fluid collections on CTA abdomen 5/8 then acute hemorrhage starting 5/12/23 requiring 11 U pRBC over last 48 hours but with negative mesenteric angiogram 5/13/23. S/p paracentesis and LUQ collection aspiration (no drain per surgery) on 5/22/23. LUQ collection growing Klebsiella pneumoniae.    5/24/23:  LUQ drain placement by IR.     5/25/23:  LUQ drain upsizing. Placement of two additional 14 F drainage catheters in the left mid and lower abdomen. Placement of a left abdominal hematoma drain.     5/26/23:   Placement of a right ascites drain and right pelvic hematoma/abscess drain by IR. Upsized left abdomen hematoma drain and left pelvic abscess/hematoma drain to 16 Fr.    5/30/23:  LUQ drain placement.     5/31/23:  To OR for ex-lap and abdominal washout. Multiple IR drains removed as described above.     6/1/23:  Return to OR for washout and abdominal closure.     6/7/23:  CT CAP for infectious w/u showing decreased size of of abd fluid collections.     Plan:  -Continue to monitor drain outputs   -IR will continue to follow.

## 2023-06-09 LAB
ALBUMIN SERPL ELPH-MCNC: 2 G/DL — LOW (ref 3.3–5)
ALP SERPL-CCNC: 150 U/L — HIGH (ref 40–120)
ALT FLD-CCNC: 12 U/L — SIGNIFICANT CHANGE UP (ref 10–45)
ANION GAP SERPL CALC-SCNC: 8 MMOL/L — SIGNIFICANT CHANGE UP (ref 5–17)
ANISOCYTOSIS BLD QL: SIGNIFICANT CHANGE UP
APTT BLD: 35 SEC — SIGNIFICANT CHANGE UP (ref 27.5–35.5)
AST SERPL-CCNC: 17 U/L — SIGNIFICANT CHANGE UP (ref 10–40)
BASE EXCESS BLDA CALC-SCNC: 1.2 MMOL/L — SIGNIFICANT CHANGE UP (ref -2–3)
BASOPHILS # BLD AUTO: 0.06 K/UL — SIGNIFICANT CHANGE UP (ref 0–0.2)
BASOPHILS NFR BLD AUTO: 0.9 % — SIGNIFICANT CHANGE UP (ref 0–2)
BILIRUB DIRECT SERPL-MCNC: 0.4 MG/DL — HIGH (ref 0–0.3)
BILIRUB INDIRECT FLD-MCNC: 0.3 MG/DL — SIGNIFICANT CHANGE UP (ref 0.2–1)
BILIRUB SERPL-MCNC: 0.7 MG/DL — SIGNIFICANT CHANGE UP (ref 0.2–1.2)
BUN SERPL-MCNC: 40 MG/DL — HIGH (ref 7–23)
CALCIUM SERPL-MCNC: 8.1 MG/DL — LOW (ref 8.4–10.5)
CHLORIDE SERPL-SCNC: 100 MMOL/L — SIGNIFICANT CHANGE UP (ref 96–108)
CO2 BLDA-SCNC: 28 MMOL/L — HIGH (ref 19–24)
CO2 SERPL-SCNC: 26 MMOL/L — SIGNIFICANT CHANGE UP (ref 22–31)
CREAT SERPL-MCNC: 0.84 MG/DL — SIGNIFICANT CHANGE UP (ref 0.5–1.3)
CULTURE RESULTS: SIGNIFICANT CHANGE UP
EGFR: 104 ML/MIN/1.73M2 — SIGNIFICANT CHANGE UP
EOSINOPHIL # BLD AUTO: 0.38 K/UL — SIGNIFICANT CHANGE UP (ref 0–0.5)
EOSINOPHIL NFR BLD AUTO: 5.3 % — SIGNIFICANT CHANGE UP (ref 0–6)
GIANT PLATELETS BLD QL SMEAR: PRESENT — SIGNIFICANT CHANGE UP
GLUCOSE BLDC GLUCOMTR-MCNC: 132 MG/DL — HIGH (ref 70–99)
GLUCOSE BLDC GLUCOMTR-MCNC: 96 MG/DL — SIGNIFICANT CHANGE UP (ref 70–99)
GLUCOSE SERPL-MCNC: 118 MG/DL — HIGH (ref 70–99)
GRAM STN FLD: SIGNIFICANT CHANGE UP
GRAM STN FLD: SIGNIFICANT CHANGE UP
HCO3 BLDA-SCNC: 27 MMOL/L — SIGNIFICANT CHANGE UP (ref 21–28)
HCT VFR BLD CALC: 21.9 % — LOW (ref 39–50)
HCT VFR BLD CALC: 25.6 % — LOW (ref 39–50)
HGB BLD-MCNC: 6.9 G/DL — CRITICAL LOW (ref 13–17)
HGB BLD-MCNC: 8.3 G/DL — LOW (ref 13–17)
INR BLD: 1.32 — HIGH (ref 0.88–1.16)
LACTATE SERPL-SCNC: 0.8 MMOL/L — SIGNIFICANT CHANGE UP (ref 0.5–2)
LYMPHOCYTES # BLD AUTO: 0.58 K/UL — LOW (ref 1–3.3)
LYMPHOCYTES # BLD AUTO: 8 % — LOW (ref 13–44)
MACROCYTES BLD QL: SIGNIFICANT CHANGE UP
MAGNESIUM SERPL-MCNC: 2 MG/DL — SIGNIFICANT CHANGE UP (ref 1.6–2.6)
MANUAL SMEAR VERIFICATION: SIGNIFICANT CHANGE UP
MCHC RBC-ENTMCNC: 28.6 PG — SIGNIFICANT CHANGE UP (ref 27–34)
MCHC RBC-ENTMCNC: 29.6 PG — SIGNIFICANT CHANGE UP (ref 27–34)
MCHC RBC-ENTMCNC: 31.5 GM/DL — LOW (ref 32–36)
MCHC RBC-ENTMCNC: 32.4 GM/DL — SIGNIFICANT CHANGE UP (ref 32–36)
MCV RBC AUTO: 90.9 FL — SIGNIFICANT CHANGE UP (ref 80–100)
MCV RBC AUTO: 91.4 FL — SIGNIFICANT CHANGE UP (ref 80–100)
METAMYELOCYTES # FLD: 0.9 % — HIGH (ref 0–0)
MONOCYTES # BLD AUTO: 0.89 K/UL — SIGNIFICANT CHANGE UP (ref 0–0.9)
MONOCYTES NFR BLD AUTO: 12.4 % — SIGNIFICANT CHANGE UP (ref 2–14)
NEUTROPHILS # BLD AUTO: 5.21 K/UL — SIGNIFICANT CHANGE UP (ref 1.8–7.4)
NEUTROPHILS NFR BLD AUTO: 63.7 % — SIGNIFICANT CHANGE UP (ref 43–77)
NEUTS BAND # BLD: 8.8 % — HIGH (ref 0–8)
NRBC # BLD: 0 /100 WBCS — SIGNIFICANT CHANGE UP (ref 0–0)
NRBC # BLD: 0 /100 WBCS — SIGNIFICANT CHANGE UP (ref 0–0)
OVALOCYTES BLD QL SMEAR: SLIGHT — SIGNIFICANT CHANGE UP
PCO2 BLDA: 44 MMHG — SIGNIFICANT CHANGE UP (ref 35–48)
PH BLDA: 7.39 — SIGNIFICANT CHANGE UP (ref 7.35–7.45)
PHOSPHATE SERPL-MCNC: 4.2 MG/DL — SIGNIFICANT CHANGE UP (ref 2.5–4.5)
PLAT MORPH BLD: ABNORMAL
PLATELET # BLD AUTO: 109 K/UL — LOW (ref 150–400)
PLATELET # BLD AUTO: 122 K/UL — LOW (ref 150–400)
PO2 BLDA: 106 MMHG — SIGNIFICANT CHANGE UP (ref 83–108)
POIKILOCYTOSIS BLD QL AUTO: SLIGHT — SIGNIFICANT CHANGE UP
POLYCHROMASIA BLD QL SMEAR: SLIGHT — SIGNIFICANT CHANGE UP
POTASSIUM SERPL-MCNC: 4.4 MMOL/L — SIGNIFICANT CHANGE UP (ref 3.5–5.3)
POTASSIUM SERPL-SCNC: 4.4 MMOL/L — SIGNIFICANT CHANGE UP (ref 3.5–5.3)
PROT SERPL-MCNC: 5.4 G/DL — LOW (ref 6–8.3)
PROTHROM AB SERPL-ACNC: 15.8 SEC — HIGH (ref 10.5–13.4)
RBC # BLD: 2.41 M/UL — LOW (ref 4.2–5.8)
RBC # BLD: 2.8 M/UL — LOW (ref 4.2–5.8)
RBC # FLD: 15.2 % — HIGH (ref 10.3–14.5)
RBC # FLD: 15.6 % — HIGH (ref 10.3–14.5)
RBC BLD AUTO: ABNORMAL
SAO2 % BLDA: 98.7 % — HIGH (ref 94–98)
SCHISTOCYTES BLD QL AUTO: SLIGHT — SIGNIFICANT CHANGE UP
SODIUM SERPL-SCNC: 134 MMOL/L — LOW (ref 135–145)
SPECIMEN SOURCE: SIGNIFICANT CHANGE UP
SPECIMEN SOURCE: SIGNIFICANT CHANGE UP
WBC # BLD: 7.19 K/UL — SIGNIFICANT CHANGE UP (ref 3.8–10.5)
WBC # BLD: 8.29 K/UL — SIGNIFICANT CHANGE UP (ref 3.8–10.5)
WBC # FLD AUTO: 7.19 K/UL — SIGNIFICANT CHANGE UP (ref 3.8–10.5)
WBC # FLD AUTO: 8.29 K/UL — SIGNIFICANT CHANGE UP (ref 3.8–10.5)

## 2023-06-09 PROCEDURE — 99233 SBSQ HOSP IP/OBS HIGH 50: CPT

## 2023-06-09 PROCEDURE — 99291 CRITICAL CARE FIRST HOUR: CPT | Mod: GC

## 2023-06-09 PROCEDURE — 93970 EXTREMITY STUDY: CPT | Mod: 26

## 2023-06-09 PROCEDURE — 95720 EEG PHY/QHP EA INCR W/VEEG: CPT

## 2023-06-09 PROCEDURE — 99291 CRITICAL CARE FIRST HOUR: CPT

## 2023-06-09 PROCEDURE — 31624 DX BRONCHOSCOPE/LAVAGE: CPT | Mod: GC,59

## 2023-06-09 PROCEDURE — 93010 ELECTROCARDIOGRAM REPORT: CPT

## 2023-06-09 PROCEDURE — 71045 X-RAY EXAM CHEST 1 VIEW: CPT | Mod: 26

## 2023-06-09 RX ORDER — HYDROMORPHONE HYDROCHLORIDE 2 MG/ML
2 INJECTION INTRAMUSCULAR; INTRAVENOUS; SUBCUTANEOUS ONCE
Refills: 0 | Status: DISCONTINUED | OUTPATIENT
Start: 2023-06-09 | End: 2023-06-09

## 2023-06-09 RX ORDER — HYDROMORPHONE HYDROCHLORIDE 2 MG/ML
1 INJECTION INTRAMUSCULAR; INTRAVENOUS; SUBCUTANEOUS ONCE
Refills: 0 | Status: DISCONTINUED | OUTPATIENT
Start: 2023-06-09 | End: 2023-06-09

## 2023-06-09 RX ORDER — CASPOFUNGIN ACETATE 7 MG/ML
70 INJECTION, POWDER, LYOPHILIZED, FOR SOLUTION INTRAVENOUS ONCE
Refills: 0 | Status: COMPLETED | OUTPATIENT
Start: 2023-06-09 | End: 2023-06-09

## 2023-06-09 RX ORDER — MIDAZOLAM HYDROCHLORIDE 1 MG/ML
2 INJECTION, SOLUTION INTRAMUSCULAR; INTRAVENOUS ONCE
Refills: 0 | Status: DISCONTINUED | OUTPATIENT
Start: 2023-06-09 | End: 2023-06-09

## 2023-06-09 RX ORDER — CASPOFUNGIN ACETATE 7 MG/ML
INJECTION, POWDER, LYOPHILIZED, FOR SOLUTION INTRAVENOUS
Refills: 0 | Status: DISCONTINUED | OUTPATIENT
Start: 2023-06-09 | End: 2023-06-09

## 2023-06-09 RX ORDER — ELECTROLYTE SOLUTION,INJ
1 VIAL (ML) INTRAVENOUS
Refills: 0 | Status: DISCONTINUED | OUTPATIENT
Start: 2023-06-09 | End: 2023-06-09

## 2023-06-09 RX ORDER — HYDRALAZINE HCL 50 MG
1 TABLET ORAL ONCE
Refills: 0 | Status: DISCONTINUED | OUTPATIENT
Start: 2023-06-09 | End: 2023-06-09

## 2023-06-09 RX ORDER — ASPIRIN/CALCIUM CARB/MAGNESIUM 324 MG
81 TABLET ORAL EVERY 24 HOURS
Refills: 0 | Status: DISCONTINUED | OUTPATIENT
Start: 2023-06-09 | End: 2023-06-25

## 2023-06-09 RX ORDER — CASPOFUNGIN ACETATE 7 MG/ML
50 INJECTION, POWDER, LYOPHILIZED, FOR SOLUTION INTRAVENOUS EVERY 24 HOURS
Refills: 0 | Status: DISCONTINUED | OUTPATIENT
Start: 2023-06-10 | End: 2023-06-12

## 2023-06-09 RX ORDER — MEROPENEM 1 G/30ML
1000 INJECTION INTRAVENOUS EVERY 8 HOURS
Refills: 0 | Status: COMPLETED | OUTPATIENT
Start: 2023-06-09 | End: 2023-06-10

## 2023-06-09 RX ORDER — ACETAMINOPHEN 500 MG
1000 TABLET ORAL ONCE
Refills: 0 | Status: COMPLETED | OUTPATIENT
Start: 2023-06-09 | End: 2023-06-09

## 2023-06-09 RX ORDER — CASPOFUNGIN ACETATE 7 MG/ML
70 INJECTION, POWDER, LYOPHILIZED, FOR SOLUTION INTRAVENOUS ONCE
Refills: 0 | Status: DISCONTINUED | OUTPATIENT
Start: 2023-06-09 | End: 2023-06-09

## 2023-06-09 RX ORDER — ACETAMINOPHEN 500 MG
975 TABLET ORAL EVERY 6 HOURS
Refills: 0 | Status: DISCONTINUED | OUTPATIENT
Start: 2023-06-09 | End: 2023-07-05

## 2023-06-09 RX ORDER — MEROPENEM 1 G/30ML
1000 INJECTION INTRAVENOUS EVERY 8 HOURS
Refills: 0 | Status: DISCONTINUED | OUTPATIENT
Start: 2023-06-10 | End: 2023-06-10

## 2023-06-09 RX ORDER — ALBUMIN HUMAN 25 %
250 VIAL (ML) INTRAVENOUS ONCE
Refills: 0 | Status: COMPLETED | OUTPATIENT
Start: 2023-06-09 | End: 2023-06-09

## 2023-06-09 RX ORDER — FENTANYL CITRATE 50 UG/ML
0.3 INJECTION INTRAVENOUS
Qty: 5000 | Refills: 0 | Status: DISCONTINUED | OUTPATIENT
Start: 2023-06-09 | End: 2023-06-10

## 2023-06-09 RX ORDER — VANCOMYCIN HCL 1 G
1000 VIAL (EA) INTRAVENOUS ONCE
Refills: 0 | Status: COMPLETED | OUTPATIENT
Start: 2023-06-09 | End: 2023-06-09

## 2023-06-09 RX ORDER — I.V. FAT EMULSION 20 G/100ML
0.71 EMULSION INTRAVENOUS
Qty: 50 | Refills: 0 | Status: DISCONTINUED | OUTPATIENT
Start: 2023-06-09 | End: 2023-06-09

## 2023-06-09 RX ADMIN — KETAMINE HYDROCHLORIDE 5.25 MG/KG/HR: 100 INJECTION INTRAMUSCULAR; INTRAVENOUS at 11:53

## 2023-06-09 RX ADMIN — Medication 975 MILLIGRAM(S): at 01:01

## 2023-06-09 RX ADMIN — HYDROMORPHONE HYDROCHLORIDE 2 MILLIGRAM(S): 2 INJECTION INTRAMUSCULAR; INTRAVENOUS; SUBCUTANEOUS at 17:30

## 2023-06-09 RX ADMIN — Medication 4 MILLILITER(S): at 16:44

## 2023-06-09 RX ADMIN — Medication 3 MILLILITER(S): at 17:45

## 2023-06-09 RX ADMIN — Medication 975 MILLIGRAM(S): at 06:32

## 2023-06-09 RX ADMIN — Medication 3 MILLILITER(S): at 09:10

## 2023-06-09 RX ADMIN — CEFTRIAXONE 100 MILLIGRAM(S): 500 INJECTION, POWDER, FOR SOLUTION INTRAMUSCULAR; INTRAVENOUS at 12:45

## 2023-06-09 RX ADMIN — Medication 975 MILLIGRAM(S): at 05:30

## 2023-06-09 RX ADMIN — HYDROMORPHONE HYDROCHLORIDE 2 MILLIGRAM(S): 2 INJECTION INTRAMUSCULAR; INTRAVENOUS; SUBCUTANEOUS at 10:56

## 2023-06-09 RX ADMIN — HYDROMORPHONE HYDROCHLORIDE 3 MILLIGRAM(S): 2 INJECTION INTRAMUSCULAR; INTRAVENOUS; SUBCUTANEOUS at 06:32

## 2023-06-09 RX ADMIN — MIDAZOLAM HYDROCHLORIDE 2 MILLIGRAM(S): 1 INJECTION, SOLUTION INTRAMUSCULAR; INTRAVENOUS at 18:38

## 2023-06-09 RX ADMIN — HYDROMORPHONE HYDROCHLORIDE 1 MILLIGRAM(S): 2 INJECTION INTRAMUSCULAR; INTRAVENOUS; SUBCUTANEOUS at 15:06

## 2023-06-09 RX ADMIN — HYDROMORPHONE HYDROCHLORIDE 3 MILLIGRAM(S): 2 INJECTION INTRAMUSCULAR; INTRAVENOUS; SUBCUTANEOUS at 10:27

## 2023-06-09 RX ADMIN — HYDROMORPHONE HYDROCHLORIDE 1 MILLIGRAM(S): 2 INJECTION INTRAMUSCULAR; INTRAVENOUS; SUBCUTANEOUS at 06:32

## 2023-06-09 RX ADMIN — HYDROMORPHONE HYDROCHLORIDE 3 MILLIGRAM(S): 2 INJECTION INTRAMUSCULAR; INTRAVENOUS; SUBCUTANEOUS at 21:00

## 2023-06-09 RX ADMIN — Medication 1 DROP(S): at 06:32

## 2023-06-09 RX ADMIN — MIDAZOLAM HYDROCHLORIDE 2 MILLIGRAM(S): 1 INJECTION, SOLUTION INTRAMUSCULAR; INTRAVENOUS at 18:36

## 2023-06-09 RX ADMIN — HYDROMORPHONE HYDROCHLORIDE 3 MILLIGRAM(S): 2 INJECTION INTRAMUSCULAR; INTRAVENOUS; SUBCUTANEOUS at 05:30

## 2023-06-09 RX ADMIN — LACOSAMIDE 150 MILLIGRAM(S): 50 TABLET ORAL at 06:42

## 2023-06-09 RX ADMIN — HYDROMORPHONE HYDROCHLORIDE 1 MILLIGRAM(S): 2 INJECTION INTRAMUSCULAR; INTRAVENOUS; SUBCUTANEOUS at 04:46

## 2023-06-09 RX ADMIN — CHLORHEXIDINE GLUCONATE 1 APPLICATION(S): 213 SOLUTION TOPICAL at 12:23

## 2023-06-09 RX ADMIN — HYDROMORPHONE HYDROCHLORIDE 3 MILLIGRAM(S): 2 INJECTION INTRAMUSCULAR; INTRAVENOUS; SUBCUTANEOUS at 20:49

## 2023-06-09 RX ADMIN — Medication 81 MILLIGRAM(S): at 13:55

## 2023-06-09 RX ADMIN — HEPARIN SODIUM 5000 UNIT(S): 5000 INJECTION INTRAVENOUS; SUBCUTANEOUS at 05:31

## 2023-06-09 RX ADMIN — CHLORHEXIDINE GLUCONATE 15 MILLILITER(S): 213 SOLUTION TOPICAL at 06:32

## 2023-06-09 RX ADMIN — HYDROMORPHONE HYDROCHLORIDE 3 MILLIGRAM(S): 2 INJECTION INTRAMUSCULAR; INTRAVENOUS; SUBCUTANEOUS at 02:19

## 2023-06-09 RX ADMIN — SODIUM CHLORIDE 4 MILLILITER(S): 9 INJECTION INTRAMUSCULAR; INTRAVENOUS; SUBCUTANEOUS at 17:45

## 2023-06-09 RX ADMIN — Medication 400 MILLIGRAM(S): at 17:16

## 2023-06-09 RX ADMIN — HYDROMORPHONE HYDROCHLORIDE 1 MILLIGRAM(S): 2 INJECTION INTRAMUSCULAR; INTRAVENOUS; SUBCUTANEOUS at 14:56

## 2023-06-09 RX ADMIN — MEROPENEM 100 MILLIGRAM(S): 1 INJECTION INTRAVENOUS at 19:05

## 2023-06-09 RX ADMIN — SODIUM CHLORIDE 4 MILLILITER(S): 9 INJECTION INTRAMUSCULAR; INTRAVENOUS; SUBCUTANEOUS at 09:10

## 2023-06-09 RX ADMIN — HYDROMORPHONE HYDROCHLORIDE 2 MILLIGRAM(S): 2 INJECTION INTRAMUSCULAR; INTRAVENOUS; SUBCUTANEOUS at 11:15

## 2023-06-09 RX ADMIN — Medication 3 MILLILITER(S): at 05:47

## 2023-06-09 RX ADMIN — HYDROMORPHONE HYDROCHLORIDE 2 MILLIGRAM(S): 2 INJECTION INTRAMUSCULAR; INTRAVENOUS; SUBCUTANEOUS at 17:16

## 2023-06-09 RX ADMIN — CASPOFUNGIN ACETATE 260 MILLIGRAM(S): 7 INJECTION, POWDER, LYOPHILIZED, FOR SOLUTION INTRAVENOUS at 14:19

## 2023-06-09 RX ADMIN — Medication 400 MILLIGRAM(S): at 11:18

## 2023-06-09 RX ADMIN — HYDROMORPHONE HYDROCHLORIDE 3 MILLIGRAM(S): 2 INJECTION INTRAMUSCULAR; INTRAVENOUS; SUBCUTANEOUS at 10:45

## 2023-06-09 RX ADMIN — PANTOPRAZOLE SODIUM 40 MILLIGRAM(S): 20 TABLET, DELAYED RELEASE ORAL at 14:05

## 2023-06-09 RX ADMIN — HYDROMORPHONE HYDROCHLORIDE 3 MILLIGRAM(S): 2 INJECTION INTRAMUSCULAR; INTRAVENOUS; SUBCUTANEOUS at 02:03

## 2023-06-09 RX ADMIN — LACOSAMIDE 150 MILLIGRAM(S): 50 TABLET ORAL at 19:38

## 2023-06-09 RX ADMIN — HEPARIN SODIUM 5000 UNIT(S): 5000 INJECTION INTRAVENOUS; SUBCUTANEOUS at 22:52

## 2023-06-09 RX ADMIN — Medication 1 EACH: at 18:37

## 2023-06-09 RX ADMIN — Medication 1000 MILLIGRAM(S): at 17:30

## 2023-06-09 RX ADMIN — Medication 4 MILLILITER(S): at 05:47

## 2023-06-09 RX ADMIN — HEPARIN SODIUM 5000 UNIT(S): 5000 INJECTION INTRAVENOUS; SUBCUTANEOUS at 13:54

## 2023-06-09 RX ADMIN — Medication 250 MILLIGRAM(S): at 21:11

## 2023-06-09 RX ADMIN — Medication 4 MILLILITER(S): at 09:09

## 2023-06-09 RX ADMIN — Medication 500 MILLILITER(S): at 10:31

## 2023-06-09 RX ADMIN — Medication 975 MILLIGRAM(S): at 22:52

## 2023-06-09 RX ADMIN — Medication 1000 MILLIGRAM(S): at 11:30

## 2023-06-09 RX ADMIN — HYDROMORPHONE HYDROCHLORIDE 3 MILLIGRAM(S): 2 INJECTION INTRAMUSCULAR; INTRAVENOUS; SUBCUTANEOUS at 13:55

## 2023-06-09 RX ADMIN — CHLORHEXIDINE GLUCONATE 15 MILLILITER(S): 213 SOLUTION TOPICAL at 17:17

## 2023-06-09 RX ADMIN — Medication 1 DROP(S): at 18:35

## 2023-06-09 RX ADMIN — SODIUM CHLORIDE 4 MILLILITER(S): 9 INJECTION INTRAMUSCULAR; INTRAVENOUS; SUBCUTANEOUS at 05:47

## 2023-06-09 NOTE — PROCEDURAL SAFETY CHECKLIST WITH OR WITHOUT SEDATION - NSTIMEOUTDATE_GEN_ALL_CORE
31-May-2023 14:45
09-Jun-2023 18:20
05-Jun-2023 05:30
26-May-2023 15:20
24-May-2023 12:57
07-Jun-2023 23:25
30-May-2023 15:05
25-May-2023 14:45

## 2023-06-09 NOTE — EEG REPORT - NS EEG TEXT BOX
Daily Updates (from 07:00 am until 07:00 am):  Day 3  6/8-6/9/2023:   Background:  continuous, with predominantly theta>delta frequencies.  Symmetry:  No persistent asymmetries of voltage or frequency.  Posterior Dominant Rhythm:  7 Hz rudimentary.  Organization: Absent.  Voltage:  Normal (20+ uV)  Variability: Yes. 		Reactivity: Yes.  N2 sleep: Absent.  Spontaneous Activity:  No epileptiform discharges.  Occasional brief frontally predominant blunted waves in a triphasic morphology  Periodic/rhythmic activity:  None  Events:  No electrographic seizures or significant clinical events.  Provocations:  Hyperventilation and Photic stimulation: was not performed.    Daily Summary:    Continuous Moderate generalized   slowing suggestive of a Moderate degree of diffuse or multifocal  dysfunction.    There were no epileptiform abnormalities or  electrographic seizures       Dary Flores MD  Attending Neurologist, [facility]

## 2023-06-09 NOTE — PROCEDURAL SAFETY CHECKLIST WITH OR WITHOUT SEDATION - NSPROCEDPERFORMDFREE_GEN_ALL_CORE
Left subclavian permacath placed
Bronchoscopy
r radial arterial line
Left Upper Quadrant Drain Placement
none
Pancreatic Drain/Hematoma drain and Ascites Drain Placement
LUQ drainage catheter
Bronchoscopy
percautaneous tracheotomy

## 2023-06-09 NOTE — PROGRESS NOTE ADULT - CRITICAL CARE ATTENDING COMMENT
54 yo M complicated hospital course - CAD, TEVAR now c/b hemorrhagic pancreatitis, bacteremia, AHRF now s/p trach after failed extubation, ATN on CVVHD.   Continues to have intermittent fevers, unclear etiology, now back on levophed. Repeat cultures, including bronch and blood. Obtain procal. Start caspo given high risk for fungal infection. If unable to obtain sputum cultures will plan for bronch. CT chest w/ LLL collapse. Increase PEEP to 8cm H2O. Taper vasopressors as tolerated  Significant air leak due to leak in vent system, but continues to have high TV (1200-2000cc) on CPAP, high MV, concern for risk of VILI. Pressure support turned to zero  Total body volume overloaded but intravascularly depleted. POCUS today with tachycardia, difficult to assess EF. IVC collapsing >50% w/ respiration. Alb infusion and c/w CVVHD net even to slightly negative.   Persistent tachycardia likely driven by infection, but will adjust pain regimen. Increase ketamine to 0.2 and attempt to decrease fentanyl  Significant stiffness noted on exam, but VEEG without seizures  TPN, slowly increase JTF, monitor for ileus.   IR to flush drains.  Anemia plan to transfuse 1 unit pRBC    Decision making of the highest complexity

## 2023-06-09 NOTE — PROGRESS NOTE ADULT - ASSESSMENT
53 year old male with PMH of HTN, Type A Aortic dissection s/p Dacron grafts, AV resuspension (2013), CAD s/p CABG x 1 SVG to RCA (2013, Dr. Medina), seizure disorder, with recent admit (Mar/Apr) for replacement of transverse aortic arch, second stage TEVAR and aoto-axillary bypass, AV replacement, and CABG x1. Post op course c/b shock, TREY requiring CVVHD, dialysis. Readmitted on 4/27 ([facility] ED) for syncope at home where imaging revealed graft endoleak and pancreatitis. Patient taken back to OR 5/5 for TEVAR, with post op course now complicated by Klebseilla bacteremia (5/15), respiratory failure requiring intubation (5/18), and bronch and PEA arrest (5/19), with respiratory cultures revealing Enterobacter, E. Faecalis and Strep angionosus. Surgery reconsulted re: active hemorrhagic pancreatitis on CT (5/13), with subsequent IR aspiration of peripancreatic fluid collection (15cc, Kleb) and paracentesis - 4L (5/22). Patient started on CVVHD 5/22. Transferred to SICU (5/25), with catheter exchange and two additional drains placed on 5/26 in IR and exlap, wash out and new drain placement on 5/31. The patient returned to the OR 6/1 for evacuation of blood and placement of J tube. Patient failed extubation on 6/3 and received trach on 6/5. Noted to be febrile overnight 6/7, 6/8, 6/9. CT Ch/Ab/Pelvis overnight 6/7 with stable abdominal collections and atelectasis of LLL.       Plan  NEURO: Sedation to remain for bronch procedure. Post procedure: wean sedation/Precedex gtt for RASS -1/0 as tolerated. Wean Fentanyl gtt (1 mcg/kg/hr) as tolerated and keep Ketamine gtt at 0.15 mcg/kg/hr. Hydromorphone 3mg IVP q 4 hours standing for additional pain management. Continue 250mg Lacosamide IV. Neuro checks per protocol and prn. Continue EEG with transition, discontinuation of Depakote.   HEENT: Artificial tears as needed.  CV: Maintain MAP > 65, titrate Norepinephrine gtt as tolerated. Repeat H/H post transfusion, monitor for acute signs of bleed.   PULM: RT and ENT to evaluate cuff leak/cuff pressure. Plan for bronch this afternoon. Continue CPAP 5/5 40% as tolerated. AC 40/500/12/5 as needed. Pulmonary toileting. Continue Duonebs, Mucomyst, and 3% Saline neb q 6 hours.   GI/FEN: Obtain abdominal film re: decrease drain output. Continue TPN @ 70cc/hr. Increase TF to 40cc/hr (Jevity) as tolerated and per surgery recs. Monitor drain output. Continue Protonix 40mg BID.  : TREY. Aquaphoresis, goal net negative. Continue to monitor U/O.  HEME: Subcu Heparin. Platelets stable. Follow up DVT/Doppler studies. Follow up H/H (re: tachy, post tx). Maintain current T&S. Hold ASA (H/H, tachycardia).  ENDO: Sliding scale per TPN protocol.  ID: Continue 2g Ceftriaxone OD for Kleb bacteremia. No growth in cultures.   PPX: SCDs. Heparin.  LINES: R radial (6/7), Left subclavian HD Cath (5/31), RIJ TLC (6/1) LIJ TLC (5/23)  WOUNDS/DRAINS: Right OR CODIE, Left OR CODIE, left IR hematoma drain, left IR pancreatic drain. Continue to monitor output and flush IR/panc drain q shift 250cc.   PT: OOB to chair.  Dispo: SICU

## 2023-06-09 NOTE — PROCEDURAL SAFETY CHECKLIST WITH OR WITHOUT SEDATION - NSPREPROCFT_GEN_ALL_CORE
Pancreatic Drain/Hematoma drain and Ascites Drain Placement
Left Upper Quadrant Drain Placement
IR LUQ drainage catheter
Bronchoscopy
radial arterial line
bronchoscopy
Left subclavian Permacath
Percautaneous tracheotomy

## 2023-06-09 NOTE — PROCEDURE NOTE - NSBRONCHPROCDETAILS_GEN_A_CORE_FT
Consent obtained. The procedure was performed for diagnostic and therapeutic purposes. The disposable bronchoscope was introduced through the endotracheal tube and advanced into the trachea. Examination of the airways was performed. Right and left bronchial tree were examined to subsegments. Visualization of the right and left bronchial tree was good. Secretions were cleared.

## 2023-06-09 NOTE — PROCEDURE NOTE - NSPRE-BRON/TUBRISKASSES_GEN_ALL_CORE
I evaluated the patient prior to bronchoscopy procedure for active pulmonary/laryngeal M. tuberculosis disease and the risk and actions taken:    Low risk with routine standard of care measures followed.

## 2023-06-09 NOTE — PROGRESS NOTE ADULT - ASSESSMENT
52 Y Male w/ HTN, aortic dissection previous admission with severe cardiogenic shock and oliguric TREY requiring CVVHD initiation 5/22. Presented to the ED 5/5 with worsening epigastric pain CTA/P revealed continued endoleak hospital course complicated by necrotic/hemorrhagic pancreatitis. Nephrology following for renal failure (initially oliguric, now non-oliguric since 6/5) required CVVHD, now off of HD since 6/7    #non-oliguric ATN 2/2 cardiogenic shock    Recommend:  Pt non-oliguric, however 250ml UO charted over last 24 hours which may not be accurate given condom cath and pt's Cr still stable. Will need more accurate UOP documentation since pt may be having renal recovery given Cr has been stable off of HD (last session 6/7)  Did not tolerate iHD 6/7 as became tachycardic and hypotensive. Ended up being net pos 1.5L/24 hours due to requiring more drips.  More hemodynamically stable now, has been on AQ at 60ml/hr since 6/8, net pos 1.5L/24 hours  Given overall vol overloaded (net pos 3.9L/admission), but stable electrolytes, will c/w AQ   Given tachycardia and fever, will continue UF at 60 ml/hr to target 1.5L UF in 24 hours. If tachycardia improves, will increase UF  Maintain MAP > 70 for adequate renal perfusion  Strict i's and o's  Renally dose abx egfr <15  Bladder scan if UO decreases   52 Y Male w/ HTN, aortic dissection previous admission with severe cardiogenic shock and oliguric TREY requiring CVVHD initiation 5/22. Presented to the ED 5/5 with worsening epigastric pain CTA/P revealed continued endoleak hospital course complicated by necrotic/hemorrhagic pancreatitis. Nephrology following for renal failure (initially oliguric, now non-oliguric since 6/5) required CVVHD, now off of HD since 6/7    #non-oliguric ATN 2/2 cardiogenic shock    Recommend:  Pt non-oliguric, however 250ml UO charted over last 24 hours which may not be accurate given condom cath and pt's Cr still stable. Will need more accurate UOP documentation since pt may be having renal recovery given Cr has been stable off of HD (last session 6/7)  Did not tolerate iHD 6/7 as became tachycardic and hypotensive. Ended up being net pos 1.5L/24 hours due to requiring more drips.  More hemodynamically stable now, has been on AQ at 60ml/hr since 6/8, net pos 1.5L/24 hours  Given overall vol overloaded (net pos 3.9L/admission), but with tachycardia and fever, suggest holding further AQ for now  With accurate assessment of his UOP, pt may respond to IV diuresis instead of AQ if needed for vol removal since his Cr has been stable suggesting possibly recovered renal function  Maintain MAP > 70 for adequate renal perfusion  Strict i's and o's  Renally dose abx egfr <15  Bladder scan if UO decreases

## 2023-06-09 NOTE — PROGRESS NOTE ADULT - ASSESSMENT
53 year-old male with PMHx HTN, type A aortic dissection s/p Dacron grafts, AV resuspension in 2013, CAD s/p CABG x 1 SVG to RCA (5/2013 with Dr. Medina), seizure disorder, recent admission 3/20-4/14 for replacement of transverse aortic arch, second stage TEVAR 5/5 with course c/b peripancreatic/RP hemorrhage/hematoma formation s/p multiple washouts on antibiotics. Epilepsy team initially consulted for history and epilepsy and suspected seizure on 5/12 thought to be provoked. Additional event on 5/14/23 that did not correlate with findings on EEG recordings. Vimpat was increased and he was tapered off Depakote previously due to concerns for possible drug-related hemorrhagic pancreatitis, however, seems less likely and currently pancreatitis is thought to be secondary to gallstones. CTH from 5/18 without acute pathology. Current EEG negative for epileptiform act    Recommendations:  - d/c vEEG  - Continue Vimpat 250mg BID   - Could continue to hold depakote, especially given thrombocytopenia  - Ativan 2mg IVP for seizures > 2mins  - Keep Mg>2 and K >4.   - Rest of management per primary team  - call for new or worsening neuro symptoms 53 year-old male with PMHx HTN, type A aortic dissection s/p Dacron grafts, AV resuspension in 2013, CAD s/p CABG x 1 SVG to RCA (5/2013 with Dr. Medina), seizure disorder, recent admission 3/20-4/14 for replacement of transverse aortic arch, second stage TEVAR 5/5 with course c/b peripancreatic/RP hemorrhage/hematoma formation s/p multiple washouts on antibiotics. Epilepsy team initially consulted for history and epilepsy and suspected seizure on 5/12 thought to be provoked. Additional event on 5/14/23 that did not correlate with findings on EEG recordings. Vimpat was increased and he was tapered off Depakote previously due to concerns for possible drug-related hemorrhagic pancreatitis, however, seems less likely and currently pancreatitis is thought to be secondary to gallstones. CTH from 5/18 without acute pathology. Current EEG negative for epileptiform activity    Recommendations:  - d/c vEEG  - Continue Vimpat 250mg BID   - continue to hold depakote, especially given thrombocytopenia  - Ativan 2mg IVP for seizures > 2mins  - Keep Mg>2 and K >4.   - Rest of management per primary team  - call for new or worsening neuro symptoms

## 2023-06-09 NOTE — PROCEDURAL SAFETY CHECKLIST WITH OR WITHOUT SEDATION - NSPRESEDATION2FT_GEN_ALL_CORE
Anesthesia confirms case reviewed for anesthesia risk alert.

## 2023-06-09 NOTE — PROCEDURAL SAFETY CHECKLIST WITH OR WITHOUT SEDATION - NSPX2BRECORDED_GEN_ALL_CORE
Pancreatic Drain/Hematoma drain and Ascites Drain Placement
Bronchoscopy
Percautaneous tracheotomy
Left subclavian permacath placed
Left Upper Quadrant Drain Placement
r radial arterial line
LUQ drainage catheter
Bronchoscopy

## 2023-06-09 NOTE — PROGRESS NOTE ADULT - SUBJECTIVE AND OBJECTIVE BOX
Interval history: Seen and examined at bedside. Mildly uncomfortable appearing but not in distress. Able to open eyes when prompted however does not follow commands otherwise. Unable to obtain ROS.    Overnight event: Fever 101, given tylenol and cultured. Dilaudid 1mg , and given 1 unit pRBC for Hb 6.9.       MEDICATIONS  (STANDING):  acetaminophen   Oral Liquid .. 975 milliGRAM(s) Enteral Tube every 6 hours  acetylcysteine 20%  Inhalation 4 milliLiter(s) Inhalation every 6 hours  albuterol/ipratropium for Nebulization 3 milliLiter(s) Nebulizer every 6 hours  artificial  tears Solution 1 Drop(s) Both EYES two times a day  aspirin  chewable 81 milliGRAM(s) Enteral Tube every 24 hours  cefTRIAXone   IVPB 2000 milliGRAM(s) IV Intermittent every 24 hours  chlorhexidine 0.12% Liquid 15 milliLiter(s) Oral Mucosa every 12 hours  chlorhexidine 2% Cloths 1 Application(s) Topical daily  dexMEDEtomidine Infusion 1 MICROgram(s)/kG/Hr (17.5 mL/Hr) IV Continuous <Continuous>  dextrose 5%. 1000 milliLiter(s) (100 mL/Hr) IV Continuous <Continuous>  dextrose 50% Injectable 25 Gram(s) IV Push once  fentaNYL   Infusion... 1 MICROgram(s)/kG/Hr (3.5 mL/Hr) IV Continuous <Continuous>  glucagon  Injectable 1 milliGRAM(s) IntraMuscular once  heparin   Injectable 5000 Unit(s) SubCutaneous every 8 hours  HYDROmorphone  Injectable 3 milliGRAM(s) IV Push every 4 hours  insulin lispro (ADMELOG) corrective regimen sliding scale   SubCutaneous every 6 hours  ketamine Infusion 0.15 mG/kG/Hr (5.25 mL/Hr) IV Continuous <Continuous>  lacosamide IVPB 250 milliGRAM(s) IV Intermittent every 12 hours  lipid, fat emulsion (Fish Oil and Plant Based) 20% Infusion 0.71 Gm/kG/Day (20.83 mL/Hr) IV Continuous <Continuous>  norepinephrine Infusion 0.05 MICROgram(s)/kG/Min (6.56 mL/Hr) IV Continuous <Continuous>  pantoprazole  Injectable 40 milliGRAM(s) IV Push daily  Parenteral Nutrition - Adult 1 Each (70 mL/Hr) TPN Continuous <Continuous>  Parenteral Nutrition - Adult 1 Each (70 mL/Hr) TPN Continuous <Continuous>  sodium chloride 3%  Inhalation 4 milliLiter(s) Inhalation every 6 hours    MEDICATIONS  (PRN):  dextrose Oral Gel 15 Gram(s) Oral once PRN Blood Glucose LESS THAN 70 milliGRAM(s)/deciliter  sodium chloride 0.9% lock flush 10 milliLiter(s) IV Push every 1 hour PRN Pre/post blood products, medications, blood draw, and to maintain line patency      T(C): 38.4 (06-09-23 @ 11:30), Max: 38.4 (06-09-23 @ 11:30)  HR: 120 (06-09-23 @ 12:00) (91 - 138)  BP: --  RR: 15 (06-09-23 @ 12:00) (6 - 41)  SpO2: 100% (06-09-23 @ 12:00) (91% - 100%)  Wt(kg): --    General:  Constitutional:  Not in distress, intermittently uncomfortable appearing  Ears, Trach to vent, mucus membranes moist  Neck: supple, no lymphadenopathy  Extremities: LE edema Rt > L  Skin: serosanguinous drains    Cognitive:  Unable to assess    Cranial Nerves:  II: Resists eye opening, unable to assess  Motor: R/L UE 1/5, R/L LE 0/5  Sensory: Unable to assess  No tremor  Coordination/Gait:  Unable to assess  Reflexes:  DTR: 2+ symmetric all 4 limbs, no clonus      Investigations:  CBC Full  -  ( 09 Jun 2023 04:59 )  WBC Count : 8.29 K/uL  RBC Count : 2.41 M/uL  Hemoglobin : 6.9 g/dL  Hematocrit : 21.9 %  Platelet Count - Automated : 109 K/uL  Mean Cell Volume : 90.9 fl  Mean Cell Hemoglobin : 28.6 pg  Mean Cell Hemoglobin Concentration : 31.5 gm/dL  Auto Neutrophil # : x  Auto Lymphocyte # : x  Auto Monocyte # : x  Auto Eosinophil # : x  Auto Basophil # : x  Auto Neutrophil % : x  Auto Lymphocyte % : x  Auto Monocyte % : x  Auto Eosinophil % : x  Auto Basophil % : x    06-09    134<L>  |  100  |  40<H>  ----------------------------<  118<H>  4.4   |  26  |  0.84    Ca    8.1<L>      09 Jun 2023 04:59  Phos  4.2     06-09  Mg     2.0     06-09    TPro  5.4<L>  /  Alb  2.0<L>  /  TBili  0.7  /  DBili  0.4<H>  /  AST  17  /  ALT  12  /  AlkPhos  150<H>  06-09    LIVER FUNCTIONS - ( 09 Jun 2023 04:59 )  Alb: 2.0 g/dL / Pro: 5.4 g/dL / ALK PHOS: 150 U/L / ALT: 12 U/L / AST: 17 U/L / GGT: x           PT/INR - ( 09 Jun 2023 04:59 )   PT: 15.8 sec;   INR: 1.32          PTT - ( 09 Jun 2023 04:59 )  PTT:35.0 sec      EEG: Interval history: Seen and examined at bedside. Mildly uncomfortable appearing but not in distress. Able to open eyes when prompted however does not follow commands otherwise. Unable to obtain ROS.    Overnight event: Fever 101, given tylenol and cultured. Dilaudid 1mg , and given 1 unit pRBC for Hb 6.9.       MEDICATIONS  (STANDING):  acetaminophen   Oral Liquid .. 975 milliGRAM(s) Enteral Tube every 6 hours  acetylcysteine 20%  Inhalation 4 milliLiter(s) Inhalation every 6 hours  albuterol/ipratropium for Nebulization 3 milliLiter(s) Nebulizer every 6 hours  artificial  tears Solution 1 Drop(s) Both EYES two times a day  aspirin  chewable 81 milliGRAM(s) Enteral Tube every 24 hours  cefTRIAXone   IVPB 2000 milliGRAM(s) IV Intermittent every 24 hours  chlorhexidine 0.12% Liquid 15 milliLiter(s) Oral Mucosa every 12 hours  chlorhexidine 2% Cloths 1 Application(s) Topical daily  dexMEDEtomidine Infusion 1 MICROgram(s)/kG/Hr (17.5 mL/Hr) IV Continuous <Continuous>  dextrose 5%. 1000 milliLiter(s) (100 mL/Hr) IV Continuous <Continuous>  dextrose 50% Injectable 25 Gram(s) IV Push once  fentaNYL   Infusion... 1 MICROgram(s)/kG/Hr (3.5 mL/Hr) IV Continuous <Continuous>  glucagon  Injectable 1 milliGRAM(s) IntraMuscular once  heparin   Injectable 5000 Unit(s) SubCutaneous every 8 hours  HYDROmorphone  Injectable 3 milliGRAM(s) IV Push every 4 hours  insulin lispro (ADMELOG) corrective regimen sliding scale   SubCutaneous every 6 hours  ketamine Infusion 0.15 mG/kG/Hr (5.25 mL/Hr) IV Continuous <Continuous>  lacosamide IVPB 250 milliGRAM(s) IV Intermittent every 12 hours  lipid, fat emulsion (Fish Oil and Plant Based) 20% Infusion 0.71 Gm/kG/Day (20.83 mL/Hr) IV Continuous <Continuous>  norepinephrine Infusion 0.05 MICROgram(s)/kG/Min (6.56 mL/Hr) IV Continuous <Continuous>  pantoprazole  Injectable 40 milliGRAM(s) IV Push daily  Parenteral Nutrition - Adult 1 Each (70 mL/Hr) TPN Continuous <Continuous>  Parenteral Nutrition - Adult 1 Each (70 mL/Hr) TPN Continuous <Continuous>  sodium chloride 3%  Inhalation 4 milliLiter(s) Inhalation every 6 hours    MEDICATIONS  (PRN):  dextrose Oral Gel 15 Gram(s) Oral once PRN Blood Glucose LESS THAN 70 milliGRAM(s)/deciliter  sodium chloride 0.9% lock flush 10 milliLiter(s) IV Push every 1 hour PRN Pre/post blood products, medications, blood draw, and to maintain line patency      T(C): 38.4 (06-09-23 @ 11:30), Max: 38.4 (06-09-23 @ 11:30)  HR: 120 (06-09-23 @ 12:00) (91 - 138)  BP: --  RR: 15 (06-09-23 @ 12:00) (6 - 41)  SpO2: 100% (06-09-23 @ 12:00) (91% - 100%)  Wt(kg): --    General:  Constitutional:  Not in distress, intermittently uncomfortable appearing  Ears, Trach to vent, mucus membranes moist  Neck: supple, no lymphadenopathy  Extremities: LE edema Rt > L  Skin: serosanguinous drains    Cognitive:  Unable to assess    Cranial Nerves:  II: Resists eye opening, unable to assess but able to open and close without difficulty  Motor: R/L UE 1/5, R/L LE 0/5  Sensory: Unable to assess  No tremor  Coordination/Gait:  Unable to assess      Investigations:  CBC Full  -  ( 09 Jun 2023 04:59 )  WBC Count : 8.29 K/uL  RBC Count : 2.41 M/uL  Hemoglobin : 6.9 g/dL  Hematocrit : 21.9 %  Platelet Count - Automated : 109 K/uL  Mean Cell Volume : 90.9 fl  Mean Cell Hemoglobin : 28.6 pg  Mean Cell Hemoglobin Concentration : 31.5 gm/dL  Auto Neutrophil # : x  Auto Lymphocyte # : x  Auto Monocyte # : x  Auto Eosinophil # : x  Auto Basophil # : x  Auto Neutrophil % : x  Auto Lymphocyte % : x  Auto Monocyte % : x  Auto Eosinophil % : x  Auto Basophil % : x    06-09    134<L>  |  100  |  40<H>  ----------------------------<  118<H>  4.4   |  26  |  0.84    Ca    8.1<L>      09 Jun 2023 04:59  Phos  4.2     06-09  Mg     2.0     06-09    TPro  5.4<L>  /  Alb  2.0<L>  /  TBili  0.7  /  DBili  0.4<H>  /  AST  17  /  ALT  12  /  AlkPhos  150<H>  06-09    LIVER FUNCTIONS - ( 09 Jun 2023 04:59 )  Alb: 2.0 g/dL / Pro: 5.4 g/dL / ALK PHOS: 150 U/L / ALT: 12 U/L / AST: 17 U/L / GGT: x           PT/INR - ( 09 Jun 2023 04:59 )   PT: 15.8 sec;   INR: 1.32          PTT - ( 09 Jun 2023 04:59 )  PTT:35.0 sec      EEG: Interval history: Seen and examined at bedside. Mildly uncomfortable appearing but not in distress. Able to open eyes when prompted however does not follow commands otherwise. Unable to obtain ROS.    Overnight event: Fever 101, given Tylenol and cultured. Dilaudid 1mg , and given 1 unit pRBC for Hb 6.9.       MEDICATIONS  (STANDING):  acetaminophen   Oral Liquid .. 975 milliGRAM(s) Enteral Tube every 6 hours  acetylcysteine 20%  Inhalation 4 milliLiter(s) Inhalation every 6 hours  albuterol/ipratropium for Nebulization 3 milliLiter(s) Nebulizer every 6 hours  artificial  tears Solution 1 Drop(s) Both EYES two times a day  aspirin  chewable 81 milliGRAM(s) Enteral Tube every 24 hours  cefTRIAXone   IVPB 2000 milliGRAM(s) IV Intermittent every 24 hours  chlorhexidine 0.12% Liquid 15 milliLiter(s) Oral Mucosa every 12 hours  chlorhexidine 2% Cloths 1 Application(s) Topical daily  dexMEDEtomidine Infusion 1 MICROgram(s)/kG/Hr (17.5 mL/Hr) IV Continuous <Continuous>  dextrose 5%. 1000 milliLiter(s) (100 mL/Hr) IV Continuous <Continuous>  dextrose 50% Injectable 25 Gram(s) IV Push once  fentaNYL   Infusion... 1 MICROgram(s)/kG/Hr (3.5 mL/Hr) IV Continuous <Continuous>  glucagon  Injectable 1 milliGRAM(s) IntraMuscular once  heparin   Injectable 5000 Unit(s) SubCutaneous every 8 hours  HYDROmorphone  Injectable 3 milliGRAM(s) IV Push every 4 hours  insulin lispro (ADMELOG) corrective regimen sliding scale   SubCutaneous every 6 hours  ketamine Infusion 0.15 mG/kG/Hr (5.25 mL/Hr) IV Continuous <Continuous>  lacosamide IVPB 250 milliGRAM(s) IV Intermittent every 12 hours  lipid, fat emulsion (Fish Oil and Plant Based) 20% Infusion 0.71 Gm/kG/Day (20.83 mL/Hr) IV Continuous <Continuous>  norepinephrine Infusion 0.05 MICROgram(s)/kG/Min (6.56 mL/Hr) IV Continuous <Continuous>  pantoprazole  Injectable 40 milliGRAM(s) IV Push daily  Parenteral Nutrition - Adult 1 Each (70 mL/Hr) TPN Continuous <Continuous>  Parenteral Nutrition - Adult 1 Each (70 mL/Hr) TPN Continuous <Continuous>  sodium chloride 3%  Inhalation 4 milliLiter(s) Inhalation every 6 hours    MEDICATIONS  (PRN):  dextrose Oral Gel 15 Gram(s) Oral once PRN Blood Glucose LESS THAN 70 milliGRAM(s)/deciliter  sodium chloride 0.9% lock flush 10 milliLiter(s) IV Push every 1 hour PRN Pre/post blood products, medications, blood draw, and to maintain line patency      T(C): 38.4 (06-09-23 @ 11:30), Max: 38.4 (06-09-23 @ 11:30)  HR: 120 (06-09-23 @ 12:00) (91 - 138)  BP: --  RR: 15 (06-09-23 @ 12:00) (6 - 41)  SpO2: 100% (06-09-23 @ 12:00) (91% - 100%)  Wt(kg): --    General:  Constitutional:  Not in distress, intermittently uncomfortable appearing  Ears, Trach to vent, mucus membranes moist  Neck: supple, no lymphadenopathy  Extremities: LE edema Rt > L  Skin: serosanguinous drains    Cognitive:  Unable to assess    Cranial Nerves:  II: Resists eye opening, unable to assess but able to open and close without difficulty  Motor: R/L UE 1/5, R/L LE 0/5  Sensory: Unable to assess  No tremor  Coordination/Gait:  Unable to assess      Investigations:  CBC Full  -  ( 09 Jun 2023 04:59 )  WBC Count : 8.29 K/uL  RBC Count : 2.41 M/uL  Hemoglobin : 6.9 g/dL  Hematocrit : 21.9 %  Platelet Count - Automated : 109 K/uL  Mean Cell Volume : 90.9 fl  Mean Cell Hemoglobin : 28.6 pg  Mean Cell Hemoglobin Concentration : 31.5 gm/dL  Auto Neutrophil # : x  Auto Lymphocyte # : x  Auto Monocyte # : x  Auto Eosinophil # : x  Auto Basophil # : x  Auto Neutrophil % : x  Auto Lymphocyte % : x  Auto Monocyte % : x  Auto Eosinophil % : x  Auto Basophil % : x    06-09    134<L>  |  100  |  40<H>  ----------------------------<  118<H>  4.4   |  26  |  0.84    Ca    8.1<L>      09 Jun 2023 04:59  Phos  4.2     06-09  Mg     2.0     06-09    TPro  5.4<L>  /  Alb  2.0<L>  /  TBili  0.7  /  DBili  0.4<H>  /  AST  17  /  ALT  12  /  AlkPhos  150<H>  06-09    LIVER FUNCTIONS - ( 09 Jun 2023 04:59 )  Alb: 2.0 g/dL / Pro: 5.4 g/dL / ALK PHOS: 150 U/L / ALT: 12 U/L / AST: 17 U/L / GGT: x           PT/INR - ( 09 Jun 2023 04:59 )   PT: 15.8 sec;   INR: 1.32          PTT - ( 09 Jun 2023 04:59 )  PTT:35.0 sec      EEG:

## 2023-06-09 NOTE — CHART NOTE - NSCHARTNOTEFT_GEN_A_CORE
Infectious Diseases Anti-infective Approval Note    Medication:  Caspofungin   Dose:  70 mg X 1 then 50 mg  Route:  IV  Frequency:  q24h  Duration**:  3 days total    Dose may be adjusted as needed for alterations in renal function.    *THIS IS NOT AN INFECTIOUS DISEASES CONSULTATION*    **Indicates duration of approval, not necessarily duration of treatment

## 2023-06-09 NOTE — PROCEDURAL SAFETY CHECKLIST WITH OR WITHOUT SEDATION - NSTEAMNURSEFT_GEN_ALL_CORE
Feliz Harp
Betsey Cruz
Marcella Ge
Joo Douglas RN, Tio Guzman RN
Joo Rodas RN, Ritu Sanders RN
Artis TRISTAN, Joo Rodas RN, Ritu Sanders RN.

## 2023-06-09 NOTE — PROGRESS NOTE ADULT - SUBJECTIVE AND OBJECTIVE BOX
INTERVAL/OVERNIGHT EVENTS: Subcu heparin restarted yesterday. Patient febrile overnight to 101. Blood cultures obtained. Levophed gtt restarted. R calf noted to be more swollen than L, DVT studies ordered. Labs drawn, no lactate but hemoglobin 6.9 from 7.1 - 1 unit PRBC transfused.     SUBJECTIVE: Patient sedated, trached. Appears to be resting comfortably.    POD #  SICU Day # 16    Neurologic Medications  acetaminophen   IVPB .. 1000 milliGRAM(s) IV Intermittent once  acetaminophen   Oral Liquid .. 975 milliGRAM(s) Enteral Tube every 6 hours  dexMEDEtomidine Infusion 1 MICROgram(s)/kG/Hr IV Continuous <Continuous>  fentaNYL   Infusion... 1 MICROgram(s)/kG/Hr IV Continuous <Continuous>  HYDROmorphone  Injectable 3 milliGRAM(s) IV Push every 4 hours  ketamine Infusion 0.15 mG/kG/Hr IV Continuous <Continuous>  lacosamide IVPB 250 milliGRAM(s) IV Intermittent every 12 hours    Respiratory Medications  acetylcysteine 20%  Inhalation 4 milliLiter(s) Inhalation every 6 hours  albuterol/ipratropium for Nebulization 3 milliLiter(s) Nebulizer every 6 hours  sodium chloride 3%  Inhalation 4 milliLiter(s) Inhalation every 6 hours    Cardiovascular Medications  norepinephrine Infusion 0.05 MICROgram(s)/kG/Min IV Continuous <Continuous>    Gastrointestinal Medications  dextrose 5%. 1000 milliLiter(s) IV Continuous <Continuous>  pantoprazole  Injectable 40 milliGRAM(s) IV Push daily  Parenteral Nutrition - Adult 1 Each TPN Continuous <Continuous>  sodium chloride 0.9% lock flush 10 milliLiter(s) IV Push every 1 hour PRN Pre/post blood products, medications, blood draw, and to maintain line patency    Genitourinary Medications    Hematologic/Oncologic Medications  heparin   Injectable 5000 Unit(s) SubCutaneous every 8 hours    Antimicrobial/Immunologic Medications  cefTRIAXone   IVPB 2000 milliGRAM(s) IV Intermittent every 24 hours    Endocrine/Metabolic Medications  dextrose 50% Injectable 25 Gram(s) IV Push once  dextrose Oral Gel 15 Gram(s) Oral once PRN Blood Glucose LESS THAN 70 milliGRAM(s)/deciliter  glucagon  Injectable 1 milliGRAM(s) IntraMuscular once  insulin lispro (ADMELOG) corrective regimen sliding scale   SubCutaneous every 6 hours    Topical/Other Medications  artificial  tears Solution 1 Drop(s) Both EYES two times a day  chlorhexidine 0.12% Liquid 15 milliLiter(s) Oral Mucosa every 12 hours  chlorhexidine 2% Cloths 1 Application(s) Topical daily      MEDICATIONS  (PRN):  dextrose Oral Gel 15 Gram(s) Oral once PRN Blood Glucose LESS THAN 70 milliGRAM(s)/deciliter  sodium chloride 0.9% lock flush 10 milliLiter(s) IV Push every 1 hour PRN Pre/post blood products, medications, blood draw, and to maintain line patency      I&O's Detail    2023 07:01  -  2023 07:00  --------------------------------------------------------  IN:    Dexmedetomidine: 303.1 mL    Enteral Tube Flush: 60 mL    Fat Emulsion (Fish Oil &amp; Plant Based) 20% Infusion: 20.8 mL    FentaNYL: 17.6 mL    FentaNYL: 59.5 mL    IV PiggyBack: 50 mL    IV PiggyBack: 50 mL    Jevity 1.2: 550 mL    Ketamine: 15.8 mL    Ketamine: 90.1 mL    Nepro with Carb Steady: 60 mL    TPN (Total Parenteral Nutrition): 1470 mL  Total IN: 2746.8 mL    OUT:    Bulb (mL): 155 mL    Bulb (mL): 15 mL    Bulb (mL): 60 mL    Drain (mL): 30 mL    Drain (mL): 65 mL    Incontinent per Condom Catheter (mL): 250 mL    Nasogastric/Oral tube (mL): 0 mL    Other (mL): 712 mL  Total OUT: 1287 mL    Total NET: 1459.8 mL      2023 07:01  -  2023 10:43  --------------------------------------------------------  IN:  Total IN: 0 mL    OUT:    Drain (mL): 80 mL    Incontinent per Condom Catheter (mL): 275 mL    Other (mL): 132 mL  Total OUT: 487 mL    Total NET: -487 mL          Vital Signs Last 24 Hrs  T(C): 37.3 (2023 09:00), Max: 38.3 (2023 01:00)  T(F): 99.1 (2023 09:00), Max: 101 (2023 01:00)  HR: 138 (2023 10:00) (91 - 138)  BP: --  BP(mean): --  RR: 12 (2023 10:00) (6 - 41)  SpO2: 100% (2023 10:00) (91% - 100%)    Parameters below as of 2023 10:00  Patient On (Oxygen Delivery Method): ventilator        GENERAL: Arousable. RASS -2. Appears comfortable.  HEENT: Normocephalic. Extraocular muscles intact. PERRLA. Mucous membranes moist, intact. Trachea midline. Trach 8.5, no drainage noted. Cuff leak present. Minimal secretions through trach. Copious oral secretions.  PULM: Rhonchi auscultated to right lung fields. Diminished lung fields in the left. Respirations even, appear unlabored. No tachypnea. Mode: CPAP with PS Fio2 40%, PEEP 5, PS 5  C/V: Sinus tachycardia. Normal cap refill. S1, S2. Systolic murmur appreciated.   ABD: Soft, distension. Tender to palpation. Bowel sounds present all four quadrants. Patent J tube, no drainage. RLQ CODIE to self suction. LLQ CODIE x 2 to self suction. Left IR drain x 2 to suction and gravity. No drainage in pancreatic IR drain. Bilious output noted in hemidiaphragm and hematoma drain. Sanguinous otherwise.  : Condom cath present. Dark milagro urine. Aquaphoresis.  EXTREM: Warm, well perfused. Ansarca/generalized edema +4. Right calf noted to be more swollen than left. Positive peripheral pulses, radial and pedal +3.   SKIN: Midline abdominal incision with staples, clean and dry. Left knee abrasion. Sacral pressure injury, unstageable. Unstageable injury to L scapula.  NEURO: Arousable. RASS -2. Patient following commands. EEG in place.    LABS:                        6.9    8.29  )-----------( 109      ( 2023 04:59 )             21.9     06-09    134<L>  |  100  |  40<H>  ----------------------------<  118<H>  4.4   |  26  |  0.84    Ca    8.1<L>      2023 04:59  Phos  4.2       Mg     2.0         TPro  5.4<L>  /  Alb  2.0<L>  /  TBili  0.7  /  DBili  0.4<H>  /  AST  17  /  ALT  12  /  AlkPhos  150<H>      PT/INR - ( 2023 04:59 )   PT: 15.8 sec;   INR: 1.32          PTT - ( 2023 04:59 )  PTT:35.0 sec  Urinalysis Basic - ( 2023 06:30 )    Color: Yellow / Appearance: Clear / S.020 / pH: x  Gluc: x / Ketone: NEGATIVE  / Bili: Small / Urobili: 0.2 E.U./dL   Blood: x / Protein: 100 mg/dL / Nitrite: NEGATIVE   Leuk Esterase: NEGATIVE / RBC: 5-10 /HPF / WBC < 5 /HPF   Sq Epi: x / Non Sq Epi: x / Bacteria: Present /HPF        RADIOLOGY & ADDITIONAL STUDIES: CXR  appears relatively unchanged to .       Urinalysis with Rflx Culture (collected 23 @ 06:30)    Culture - Sputum (collected 23 @ 18:12)  Source: .Sputum Sputum  Gram Stain (23 @ 22:35):    Numerous epithelial cells    Moderate WBC's    Rare Yeast  Final Report (23 @ 22:35):    Sputum specimen rejected.  Microscopic examination indicates    oropharyngeal contamination.  Please repeat.    Culture - Blood (collected 23 @ 21:00)  Source: .Blood Blood-Peripheral  Preliminary Report (23 @ 22:01):    No growth at 2 days.    Culture - Blood (collected 23 @ 21:00)  Source: .Blood Blood-Peripheral  Preliminary Report (23 @ 22:01):    No growth at 2 days.

## 2023-06-09 NOTE — PROCEDURAL SAFETY CHECKLIST WITH OR WITHOUT SEDATION - NSPRESURGSED_GEN_ALL_CORE
n/a
Present, accurate, and signed
n/a
Present, accurate, and signed

## 2023-06-09 NOTE — PROGRESS NOTE ADULT - SUBJECTIVE AND OBJECTIVE BOX
Patient is a 53y Male seen and evaluated at bedside. Remains on vent via trache. On Aqua at UF 60ml/hr. On pressors, levo at 0.03 this AM. Has been persistently tachycardic at 130s this AM. Also with fever 101.2 today      Meds:    acetaminophen   Oral Liquid .. 975 every 6 hours  acetylcysteine 20%  Inhalation 4 every 6 hours  albuterol/ipratropium for Nebulization 3 every 6 hours  artificial  tears Solution 1 two times a day  aspirin  chewable 81 every 24 hours  cefTRIAXone   IVPB 2000 every 24 hours  chlorhexidine 0.12% Liquid 15 every 12 hours  chlorhexidine 2% Cloths 1 daily  dexMEDEtomidine Infusion 1 <Continuous>  dextrose 5%. 1000 <Continuous>  dextrose 50% Injectable 25 once  dextrose Oral Gel 15 once PRN  fentaNYL   Infusion... 1 <Continuous>  glucagon  Injectable 1 once  heparin   Injectable 5000 every 8 hours  HYDROmorphone  Injectable 3 every 4 hours  insulin lispro (ADMELOG) corrective regimen sliding scale  every 6 hours  ketamine Infusion 0.15 <Continuous>  lacosamide IVPB 250 every 12 hours  lipid, fat emulsion (Fish Oil and Plant Based) 20% Infusion 0.71 <Continuous>  norepinephrine Infusion 0.05 <Continuous>  pantoprazole  Injectable 40 daily  Parenteral Nutrition - Adult 1 <Continuous>  Parenteral Nutrition - Adult 1 <Continuous>  sodium chloride 0.9% lock flush 10 every 1 hour PRN  sodium chloride 3%  Inhalation 4 every 6 hours      T(C): , Max: 38.4 (23 @ 11:30)  T(F): , Max: 101.2 (23 @ 11:30)  HR: 120 (23 @ 12:00)  BP: --  BP(mean): --  RR: 15 (23 @ 12:00)  SpO2: 100% (23 @ 12:00)  Wt(kg): --     @ :  -   @ 07:00  --------------------------------------------------------  IN: 2746.8 mL / OUT: 1287 mL / NET: 1459.8 mL     @ 07:01  -   @ 13:18  --------------------------------------------------------  IN: 1081.5 mL / OUT: 787 mL / NET: 294.5 mL          Review of Systems:  ROS negative except as per HPI      PHYSICAL EXAM:  GENERAL: intubated and sedated  CHEST/LUNG: mechanical breath sounds  HEART: normal S1S2, RRR  ABDOMEN: Soft, Nontender, +BS,   EXTREMITIES: +2 edema BL LE UE   ACCESS: L subclavian HD cath (placed )      LABS:                        6.9    8.29  )-----------( 109      ( 2023 04:59 )             21.9         134<L>  |  100  |  40<H>  ----------------------------<  118<H>  4.4   |  26  |  0.84    Ca    8.1<L>      2023 04:59  Phos  4.2       Mg     2.0         TPro  5.4<L>  /  Alb  2.0<L>  /  TBili  0.7  /  DBili  0.4<H>  /  AST  17  /  ALT  12  /  AlkPhos  150<H>        PT/INR - ( 2023 04:59 )   PT: 15.8 sec;   INR: 1.32          PTT - ( 2023 04:59 )  PTT:35.0 sec  Urinalysis Basic - ( 2023 06:30 )    Color: Yellow / Appearance: Clear / S.020 / pH: x  Gluc: x / Ketone: NEGATIVE  / Bili: Small / Urobili: 0.2 E.U./dL   Blood: x / Protein: 100 mg/dL / Nitrite: NEGATIVE   Leuk Esterase: NEGATIVE / RBC: 5-10 /HPF / WBC < 5 /HPF   Sq Epi: x / Non Sq Epi: x / Bacteria: Present /HPF            RADIOLOGY & ADDITIONAL STUDIES:

## 2023-06-09 NOTE — PROGRESS NOTE ADULT - SUBJECTIVE AND OBJECTIVE BOX
ON: fever 101 - tylenol, sent cultures, has fluctuance in right groin (likely fluid collection/seroma), right calf slightly more edematous than left - ordered duplex, appeared in pain - gave additional dilaudid 1mg, Hgb 6.9 - ordered 1unit pRBC  /8:UA negative.  Ketamine inc to 0.15, fentanyl capped at 1. TF inc to 30 lipids held. NGT removed. Added dilaudid 2mg q4 push. increased 3 q4 in afternoon.No bronch, CPAP 5/5 for most of day. Aquaphoresis: started Tolerating.  Can start SQH /ASA in am. OOB to stretcher chair today      SUBJECTIVE: Patient seen and examined bedside; grimacing on palpation of abdomen, neuro exam unchanged from yesterday, continues to nod when asked questions.    cefTRIAXone   IVPB 2000 milliGRAM(s) IV Intermittent every 24 hours  heparin   Injectable 5000 Unit(s) SubCutaneous every 8 hours  norepinephrine Infusion 0.05 MICROgram(s)/kG/Min IV Continuous <Continuous>      Vital Signs Last 24 Hrs  T(C): 37.8 (2023 06:15), Max: 38.3 (2023 01:00)  T(F): 100 (2023 06:15), Max: 101 (2023 01:00)  HR: 104 (2023 08:52) (90 - 130)  BP: --  BP(mean): --  RR: 13 (2023 08:00) (6 - 41)  SpO2: 100% (2023 08:52) (91% - 100%)    Parameters below as of 2023 08:00  Patient On (Oxygen Delivery Method): ventilator, cpap      I&O's Detail    2023 07:01  -  2023 07:00  --------------------------------------------------------  IN:    Dexmedetomidine: 303.1 mL    Enteral Tube Flush: 60 mL    Fat Emulsion (Fish Oil &amp; Plant Based) 20% Infusion: 20.8 mL    FentaNYL: 17.6 mL    FentaNYL: 59.5 mL    IV PiggyBack: 50 mL    IV PiggyBack: 50 mL    Jevity 1.2: 550 mL    Ketamine: 15.8 mL    Ketamine: 90.1 mL    Nepro with Carb Steady: 60 mL    TPN (Total Parenteral Nutrition): 1470 mL  Total IN: 2746.8 mL    OUT:    Bulb (mL): 155 mL    Bulb (mL): 15 mL    Bulb (mL): 60 mL    Drain (mL): 30 mL    Drain (mL): 65 mL    Incontinent per Condom Catheter (mL): 250 mL    Nasogastric/Oral tube (mL): 0 mL    Other (mL): 712 mL  Total OUT: 1287 mL    Total NET: 1459.8 mL      2023 07:01  -  2023 09:09  --------------------------------------------------------  IN:  Total IN: 0 mL    OUT:    Drain (mL): 80 mL    Incontinent per Condom Catheter (mL): 275 mL  Total OUT: 355 mL    Total NET: -355 mL      PE:    General: NAD, resting comfortably in bed  C/V: NSR, s1 s2  Pulm: Trach to vent, nonlabored breathing, no respiratory distress  Abd: Soft, ND, mildly tender, drains unchanged from prior exam, serosanguinous except for hematoma drain which is bilious  Extrem: [facility]        LABS:                        6.9    8.29  )-----------( 109      ( 2023 04:59 )             21.9     06-09    134<L>  |  100  |  40<H>  ----------------------------<  118<H>  4.4   |  26  |  0.84    Ca    8.1<L>      2023 04:59  Phos  4.2     06-  Mg     2.0     06-    TPro  5.4<L>  /  Alb  2.0<L>  /  TBili  0.7  /  DBili  0.4<H>  /  AST  17  /  ALT  12  /  AlkPhos  150<H>  06-09    PT/INR - ( 2023 04:59 )   PT: 15.8 sec;   INR: 1.32          PTT - ( 2023 04:59 )  PTT:35.0 sec  Urinalysis Basic - ( 2023 06:30 )    Color: Yellow / Appearance: Clear / S.020 / pH: x  Gluc: x / Ketone: NEGATIVE  / Bili: Small / Urobili: 0.2 E.U./dL   Blood: x / Protein: 100 mg/dL / Nitrite: NEGATIVE   Leuk Esterase: NEGATIVE / RBC: 5-10 /HPF / WBC < 5 /HPF   Sq Epi: x / Non Sq Epi: x / Bacteria: Present /HPF        RADIOLOGY & ADDITIONAL STUDIES:

## 2023-06-09 NOTE — PROCEDURE NOTE - NSBRONCHPROCASSISTBY_GEN_ALL_CORE_FT
Dr. Asha Velasquez
Supervising third year PCCM fellow [facility] and SICU attending Dr. Leigh at bedside.
Dr. Dalton Martinez
Dr. Kristin Enriquez
Robert

## 2023-06-09 NOTE — PROGRESS NOTE ADULT - SUBJECTIVE AND OBJECTIVE BOX
1. 16 Fr teal LUQ peripancreatic abscess "Left Pancreatic CODIE Drain" placed on 5/24 (attached to gravity bag):   -30cc serosanguinous output in 24 hrs. 90cc in previous 24 hrs. Flushed per nursing.     2. 16 Fr teal LUQ hematoma "Left Abdominal Hematoma CODIE Drain" placed on 5/25 and upsized on 5/26:   -65cc dark green-brown output in 24 hrs. 190cc in previous 24 hrs. Flushed easily with 3cc sterile NS.     H/H 6.9/21.9 today (7.1/22.5 yd).

## 2023-06-09 NOTE — PROGRESS NOTE ADULT - ATTENDING COMMENTS
History of epilepsy with complicated hospital course.  Sedation now weaned  EEG has been negative for seizure activity > 48 hrs   Can d/c EEG for now  Continue LCM 250mg BID

## 2023-06-09 NOTE — PROCEDURE NOTE - NSBRONCHSPECIMENS_GEN_ALL_CORE
Cell Count/Gram Stain and Culture
Cell Count/Gram Stain and Culture/Fungal Culture/Acid Fast Culture

## 2023-06-09 NOTE — PROCEDURAL SAFETY CHECKLIST WITH OR WITHOUT SEDATION - NSPREPROCDATE6_GEN_ALL_CORE
09-Jun-2023 18:15
26-May-2023 15:17
05-Jun-2023 04:54
25-May-2023 14:45
24-May-2023 12:50
30-May-2023 14:39
07-Jun-2023 23:25
31-May-2023 14:40

## 2023-06-09 NOTE — PROCEDURAL SAFETY CHECKLIST WITH OR WITHOUT SEDATION - NSPRESEDATIONFT_GEN_ALL_CORE
Physician confirms case reviewed for anesthesia consultation requirements.

## 2023-06-09 NOTE — PROCEDURAL SAFETY CHECKLIST WITH OR WITHOUT SEDATION - NSPREPROCEDSEDAT_GEN_ALL_CORE
without sedation
with sedation
without sedation
with sedation

## 2023-06-09 NOTE — PROCEDURAL SAFETY CHECKLIST WITH OR WITHOUT SEDATION - NSPOSTDEBRIEFDT_GEN_ALL_CORE
24-May-2023 14:30
25-May-2023 16:09
31-May-2023 15:15
30-May-2023 15:05
07-Jun-2023 23:40
09-Jun-2023 18:30
05-Jun-2023 05:30
26-May-2023 16:00

## 2023-06-09 NOTE — PROGRESS NOTE ADULT - ATTENDING COMMENTS
Admitted s/p TEVAR c/b endoleak and hemorrhagic pancreatitis, shock, klebsiella bacteremia, hypoxic respiratory failure, PEA arrest.  iATN started on CVVHD 5/22, non oliguric with possible renal recovery, on AQ for volume overload. d/w nursing and primary team, watch UO closely, if drops <30ml/hr please stop AQ. Last dialyzed June 7. Shock resolved.

## 2023-06-09 NOTE — PROGRESS NOTE ADULT - ASSESSMENT
53-year-old male with PMHx of HTN, Type A aortic dissection s/p Dacron grafts, AV resuspension in 2013, CAD s/p CABG x 1 SVG to RCA (05/2013, Dr. Medina), seizure disorder (last episode on 07/04/2022) S/P replacement of transverse aortic arch, second stage thoracic endovascular aortic repair, aorto-axillary bypass, AV replacement (bio 23mm), in APr 2023 and CABG x 1 (SVG-RCA) EF 75% (Ignacio, 03/2020) now s/p 2nd state TEVAR (05/05/2023) prompting General Surgery consult for acute pancreatitis with large peripancreatic/perigastric fluid collections on CTA abdomen 05/08/2023 followed by acute hemorrhage starting 05/12/2023 requiring 11 U pRBC, but with negative mesenteric angiogram 05/13/2023 prompting Hepatobiliary Surgery reconsult for possible hemorrhagic pancreatitis, 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  Patient is s/p OR washout (05/31/2023), drains (RP, LUQ, pelvis) placement, and abthera wound vac. S/p percutaneous tracheostomy 06/05.    Plan:    -Wean off sedation  -F/u EEG   -F/u neurology recs  -F/u Pulm recs  -F/u IR  -Monitor NGT output  -Continue TFs 30cc/hr  -Monitor drain output  -Continue bulb flushes and to gravity after flushing finished  -Rest of care per SICU  -Surgery team1 following

## 2023-06-10 LAB
ALBUMIN SERPL ELPH-MCNC: 2 G/DL — LOW (ref 3.3–5)
ALP SERPL-CCNC: 209 U/L — HIGH (ref 40–120)
ALT FLD-CCNC: 13 U/L — SIGNIFICANT CHANGE UP (ref 10–45)
ANION GAP SERPL CALC-SCNC: 7 MMOL/L — SIGNIFICANT CHANGE UP (ref 5–17)
AST SERPL-CCNC: 16 U/L — SIGNIFICANT CHANGE UP (ref 10–40)
B PERT IGG+IGM PNL SER: SIGNIFICANT CHANGE UP
BASE EXCESS BLDA CALC-SCNC: 1.2 MMOL/L — SIGNIFICANT CHANGE UP (ref -2–3)
BILIRUB SERPL-MCNC: 0.7 MG/DL — SIGNIFICANT CHANGE UP (ref 0.2–1.2)
BUN SERPL-MCNC: 43 MG/DL — HIGH (ref 7–23)
CALCIUM SERPL-MCNC: 8.2 MG/DL — LOW (ref 8.4–10.5)
CHLORIDE SERPL-SCNC: 101 MMOL/L — SIGNIFICANT CHANGE UP (ref 96–108)
CO2 BLDA-SCNC: 27 MMOL/L — HIGH (ref 19–24)
CO2 SERPL-SCNC: 25 MMOL/L — SIGNIFICANT CHANGE UP (ref 22–31)
COLOR FLD: SIGNIFICANT CHANGE UP
COMMENT - FLUIDS: SIGNIFICANT CHANGE UP
CREAT SERPL-MCNC: 0.76 MG/DL — SIGNIFICANT CHANGE UP (ref 0.5–1.3)
EGFR: 107 ML/MIN/1.73M2 — SIGNIFICANT CHANGE UP
FLUID INTAKE SUBSTANCE CLASS: SIGNIFICANT CHANGE UP
GLUCOSE BLDC GLUCOMTR-MCNC: 116 MG/DL — HIGH (ref 70–99)
GLUCOSE BLDC GLUCOMTR-MCNC: 120 MG/DL — HIGH (ref 70–99)
GLUCOSE BLDC GLUCOMTR-MCNC: 125 MG/DL — HIGH (ref 70–99)
GLUCOSE BLDC GLUCOMTR-MCNC: 131 MG/DL — HIGH (ref 70–99)
GLUCOSE BLDC GLUCOMTR-MCNC: 138 MG/DL — HIGH (ref 70–99)
GLUCOSE SERPL-MCNC: 124 MG/DL — HIGH (ref 70–99)
HCO3 BLDA-SCNC: 26 MMOL/L — SIGNIFICANT CHANGE UP (ref 21–28)
HCT VFR BLD CALC: 23.9 % — LOW (ref 39–50)
HGB BLD-MCNC: 7.5 G/DL — LOW (ref 13–17)
LACTATE SERPL-SCNC: 1 MMOL/L — SIGNIFICANT CHANGE UP (ref 0.5–2)
LYMPHOCYTES # FLD: 3 % — SIGNIFICANT CHANGE UP
MAGNESIUM SERPL-MCNC: 2 MG/DL — SIGNIFICANT CHANGE UP (ref 1.6–2.6)
MCHC RBC-ENTMCNC: 28.8 PG — SIGNIFICANT CHANGE UP (ref 27–34)
MCHC RBC-ENTMCNC: 31.4 GM/DL — LOW (ref 32–36)
MCV RBC AUTO: 91.9 FL — SIGNIFICANT CHANGE UP (ref 80–100)
MONOS+MACROS # FLD: 2 % — SIGNIFICANT CHANGE UP
MRSA PCR RESULT.: NEGATIVE — SIGNIFICANT CHANGE UP
NEUTROPHILS-BODY FLUID: 95 % — SIGNIFICANT CHANGE UP
NIGHT BLUE STAIN TISS: SIGNIFICANT CHANGE UP
NRBC # BLD: 0 /100 WBCS — SIGNIFICANT CHANGE UP (ref 0–0)
PCO2 BLDA: 41 MMHG — SIGNIFICANT CHANGE UP (ref 35–48)
PH BLDA: 7.41 — SIGNIFICANT CHANGE UP (ref 7.35–7.45)
PHOSPHATE SERPL-MCNC: 4.1 MG/DL — SIGNIFICANT CHANGE UP (ref 2.5–4.5)
PLATELET # BLD AUTO: 143 K/UL — LOW (ref 150–400)
PO2 BLDA: 182 MMHG — HIGH (ref 83–108)
POTASSIUM SERPL-MCNC: 4.4 MMOL/L — SIGNIFICANT CHANGE UP (ref 3.5–5.3)
POTASSIUM SERPL-SCNC: 4.4 MMOL/L — SIGNIFICANT CHANGE UP (ref 3.5–5.3)
PROCALCITONIN SERPL-MCNC: 2.93 NG/ML — HIGH (ref 0.02–0.1)
PROT SERPL-MCNC: 5.5 G/DL — LOW (ref 6–8.3)
RBC # BLD: 2.6 M/UL — LOW (ref 4.2–5.8)
RBC # FLD: 15.2 % — HIGH (ref 10.3–14.5)
RCV VOL RI: 3778 /UL — HIGH (ref 0–0)
S AUREUS DNA NOSE QL NAA+PROBE: NEGATIVE — SIGNIFICANT CHANGE UP
SAO2 % BLDA: 98.5 % — HIGH (ref 94–98)
SODIUM SERPL-SCNC: 133 MMOL/L — LOW (ref 135–145)
SPECIMEN SOURCE FLD: SIGNIFICANT CHANGE UP
SPECIMEN SOURCE: SIGNIFICANT CHANGE UP
TOTAL NUCLEATED CELL COUNT, BODY FLUID: SIGNIFICANT CHANGE UP /UL
TUBE TYPE: SIGNIFICANT CHANGE UP
WBC # BLD: 9 K/UL — SIGNIFICANT CHANGE UP (ref 3.8–10.5)
WBC # FLD AUTO: 9 K/UL — SIGNIFICANT CHANGE UP (ref 3.8–10.5)

## 2023-06-10 PROCEDURE — 99233 SBSQ HOSP IP/OBS HIGH 50: CPT | Mod: GC

## 2023-06-10 PROCEDURE — 71045 X-RAY EXAM CHEST 1 VIEW: CPT | Mod: 26,76

## 2023-06-10 RX ORDER — ELECTROLYTE SOLUTION,INJ
1 VIAL (ML) INTRAVENOUS
Refills: 0 | Status: DISCONTINUED | OUTPATIENT
Start: 2023-06-10 | End: 2023-06-10

## 2023-06-10 RX ORDER — FUROSEMIDE 40 MG
100 TABLET ORAL ONCE
Refills: 0 | Status: DISCONTINUED | OUTPATIENT
Start: 2023-06-10 | End: 2023-06-10

## 2023-06-10 RX ORDER — MEROPENEM 1 G/30ML
1000 INJECTION INTRAVENOUS EVERY 8 HOURS
Refills: 0 | Status: DISCONTINUED | OUTPATIENT
Start: 2023-06-10 | End: 2023-06-13

## 2023-06-10 RX ORDER — MEROPENEM 1 G/30ML
1000 INJECTION INTRAVENOUS EVERY 8 HOURS
Refills: 0 | Status: DISCONTINUED | OUTPATIENT
Start: 2023-06-10 | End: 2023-06-10

## 2023-06-10 RX ORDER — FENTANYL CITRATE 50 UG/ML
0.4 INJECTION INTRAVENOUS
Qty: 5000 | Refills: 0 | Status: DISCONTINUED | OUTPATIENT
Start: 2023-06-10 | End: 2023-06-12

## 2023-06-10 RX ORDER — KETAMINE HYDROCHLORIDE 100 MG/ML
0.15 INJECTION INTRAMUSCULAR; INTRAVENOUS
Qty: 200 | Refills: 0 | Status: DISCONTINUED | OUTPATIENT
Start: 2023-06-10 | End: 2023-06-13

## 2023-06-10 RX ORDER — FUROSEMIDE 40 MG
50 TABLET ORAL ONCE
Refills: 0 | Status: COMPLETED | OUTPATIENT
Start: 2023-06-10 | End: 2023-06-10

## 2023-06-10 RX ADMIN — Medication 1 EACH: at 18:26

## 2023-06-10 RX ADMIN — Medication 1 DROP(S): at 06:01

## 2023-06-10 RX ADMIN — PANTOPRAZOLE SODIUM 40 MILLIGRAM(S): 20 TABLET, DELAYED RELEASE ORAL at 13:19

## 2023-06-10 RX ADMIN — Medication 3 MILLILITER(S): at 10:02

## 2023-06-10 RX ADMIN — DEXMEDETOMIDINE HYDROCHLORIDE IN 0.9% SODIUM CHLORIDE 17.5 MICROGRAM(S)/KG/HR: 4 INJECTION INTRAVENOUS at 13:24

## 2023-06-10 RX ADMIN — Medication 4 MILLILITER(S): at 06:01

## 2023-06-10 RX ADMIN — HYDROMORPHONE HYDROCHLORIDE 3 MILLIGRAM(S): 2 INJECTION INTRAMUSCULAR; INTRAVENOUS; SUBCUTANEOUS at 02:00

## 2023-06-10 RX ADMIN — SODIUM CHLORIDE 4 MILLILITER(S): 9 INJECTION INTRAMUSCULAR; INTRAVENOUS; SUBCUTANEOUS at 10:03

## 2023-06-10 RX ADMIN — CHLORHEXIDINE GLUCONATE 1 APPLICATION(S): 213 SOLUTION TOPICAL at 13:19

## 2023-06-10 RX ADMIN — MEROPENEM 100 MILLIGRAM(S): 1 INJECTION INTRAVENOUS at 05:55

## 2023-06-10 RX ADMIN — Medication 975 MILLIGRAM(S): at 17:00

## 2023-06-10 RX ADMIN — HYDROMORPHONE HYDROCHLORIDE 3 MILLIGRAM(S): 2 INJECTION INTRAMUSCULAR; INTRAVENOUS; SUBCUTANEOUS at 01:45

## 2023-06-10 RX ADMIN — HYDROMORPHONE HYDROCHLORIDE 3 MILLIGRAM(S): 2 INJECTION INTRAMUSCULAR; INTRAVENOUS; SUBCUTANEOUS at 22:40

## 2023-06-10 RX ADMIN — Medication 50 MILLIGRAM(S): at 13:30

## 2023-06-10 RX ADMIN — Medication 975 MILLIGRAM(S): at 06:36

## 2023-06-10 RX ADMIN — HEPARIN SODIUM 5000 UNIT(S): 5000 INJECTION INTRAVENOUS; SUBCUTANEOUS at 13:13

## 2023-06-10 RX ADMIN — Medication 975 MILLIGRAM(S): at 07:10

## 2023-06-10 RX ADMIN — HYDROMORPHONE HYDROCHLORIDE 3 MILLIGRAM(S): 2 INJECTION INTRAMUSCULAR; INTRAVENOUS; SUBCUTANEOUS at 13:13

## 2023-06-10 RX ADMIN — DEXMEDETOMIDINE HYDROCHLORIDE IN 0.9% SODIUM CHLORIDE 17.5 MICROGRAM(S)/KG/HR: 4 INJECTION INTRAVENOUS at 10:13

## 2023-06-10 RX ADMIN — Medication 975 MILLIGRAM(S): at 21:24

## 2023-06-10 RX ADMIN — SODIUM CHLORIDE 4 MILLILITER(S): 9 INJECTION INTRAMUSCULAR; INTRAVENOUS; SUBCUTANEOUS at 20:46

## 2023-06-10 RX ADMIN — LACOSAMIDE 150 MILLIGRAM(S): 50 TABLET ORAL at 18:26

## 2023-06-10 RX ADMIN — HEPARIN SODIUM 5000 UNIT(S): 5000 INJECTION INTRAVENOUS; SUBCUTANEOUS at 21:26

## 2023-06-10 RX ADMIN — CHLORHEXIDINE GLUCONATE 15 MILLILITER(S): 213 SOLUTION TOPICAL at 17:03

## 2023-06-10 RX ADMIN — MEROPENEM 100 MILLIGRAM(S): 1 INJECTION INTRAVENOUS at 21:25

## 2023-06-10 RX ADMIN — HYDROMORPHONE HYDROCHLORIDE 3 MILLIGRAM(S): 2 INJECTION INTRAMUSCULAR; INTRAVENOUS; SUBCUTANEOUS at 17:45

## 2023-06-10 RX ADMIN — DEXMEDETOMIDINE HYDROCHLORIDE IN 0.9% SODIUM CHLORIDE 17.5 MICROGRAM(S)/KG/HR: 4 INJECTION INTRAVENOUS at 00:36

## 2023-06-10 RX ADMIN — Medication 50 MILLIGRAM(S): at 13:26

## 2023-06-10 RX ADMIN — HYDROMORPHONE HYDROCHLORIDE 3 MILLIGRAM(S): 2 INJECTION INTRAMUSCULAR; INTRAVENOUS; SUBCUTANEOUS at 11:00

## 2023-06-10 RX ADMIN — DEXMEDETOMIDINE HYDROCHLORIDE IN 0.9% SODIUM CHLORIDE 17.5 MICROGRAM(S)/KG/HR: 4 INJECTION INTRAVENOUS at 04:27

## 2023-06-10 RX ADMIN — CHLORHEXIDINE GLUCONATE 15 MILLILITER(S): 213 SOLUTION TOPICAL at 05:55

## 2023-06-10 RX ADMIN — Medication 1 DROP(S): at 17:03

## 2023-06-10 RX ADMIN — HYDROMORPHONE HYDROCHLORIDE 3 MILLIGRAM(S): 2 INJECTION INTRAMUSCULAR; INTRAVENOUS; SUBCUTANEOUS at 06:37

## 2023-06-10 RX ADMIN — Medication 81 MILLIGRAM(S): at 13:12

## 2023-06-10 RX ADMIN — MEROPENEM 100 MILLIGRAM(S): 1 INJECTION INTRAVENOUS at 16:16

## 2023-06-10 RX ADMIN — DEXMEDETOMIDINE HYDROCHLORIDE IN 0.9% SODIUM CHLORIDE 17.5 MICROGRAM(S)/KG/HR: 4 INJECTION INTRAVENOUS at 18:33

## 2023-06-10 RX ADMIN — Medication 3 MILLILITER(S): at 20:43

## 2023-06-10 RX ADMIN — HYDROMORPHONE HYDROCHLORIDE 3 MILLIGRAM(S): 2 INJECTION INTRAMUSCULAR; INTRAVENOUS; SUBCUTANEOUS at 10:14

## 2023-06-10 RX ADMIN — Medication 3 MILLILITER(S): at 06:01

## 2023-06-10 RX ADMIN — SODIUM CHLORIDE 4 MILLILITER(S): 9 INJECTION INTRAMUSCULAR; INTRAVENOUS; SUBCUTANEOUS at 06:01

## 2023-06-10 RX ADMIN — LACOSAMIDE 150 MILLIGRAM(S): 50 TABLET ORAL at 06:28

## 2023-06-10 RX ADMIN — HYDROMORPHONE HYDROCHLORIDE 3 MILLIGRAM(S): 2 INJECTION INTRAMUSCULAR; INTRAVENOUS; SUBCUTANEOUS at 21:25

## 2023-06-10 RX ADMIN — KETAMINE HYDROCHLORIDE 7 MG/KG/HR: 100 INJECTION INTRAMUSCULAR; INTRAVENOUS at 02:00

## 2023-06-10 RX ADMIN — KETAMINE HYDROCHLORIDE 7 MG/KG/HR: 100 INJECTION INTRAMUSCULAR; INTRAVENOUS at 11:11

## 2023-06-10 RX ADMIN — Medication 975 MILLIGRAM(S): at 10:14

## 2023-06-10 RX ADMIN — HYDROMORPHONE HYDROCHLORIDE 3 MILLIGRAM(S): 2 INJECTION INTRAMUSCULAR; INTRAVENOUS; SUBCUTANEOUS at 17:03

## 2023-06-10 RX ADMIN — Medication 975 MILLIGRAM(S): at 16:15

## 2023-06-10 RX ADMIN — Medication 4 MILLILITER(S): at 10:02

## 2023-06-10 RX ADMIN — Medication 975 MILLIGRAM(S): at 11:00

## 2023-06-10 RX ADMIN — Medication 975 MILLIGRAM(S): at 22:00

## 2023-06-10 RX ADMIN — Medication 4 MILLILITER(S): at 20:45

## 2023-06-10 RX ADMIN — HYDROMORPHONE HYDROCHLORIDE 3 MILLIGRAM(S): 2 INJECTION INTRAMUSCULAR; INTRAVENOUS; SUBCUTANEOUS at 14:00

## 2023-06-10 RX ADMIN — CASPOFUNGIN ACETATE 260 MILLIGRAM(S): 7 INJECTION, POWDER, LYOPHILIZED, FOR SOLUTION INTRAVENOUS at 16:15

## 2023-06-10 NOTE — PROGRESS NOTE ADULT - SUBJECTIVE AND OBJECTIVE BOX
SUBJECTIVE: Seen and evaluated in am. Resting comfortably, overnight had bronchoscopy which showed purulence in the LLL and some bleeding around the trach. Pt had good UOP. No acute complaints, states that pain was controlled. -k-r-x-c-cough-dysuria. Getting TF at 40cc/hr.       MEDICATIONS  (STANDING):  acetaminophen   Oral Liquid .. 975 milliGRAM(s) Enteral Tube every 6 hours  acetylcysteine 20%  Inhalation 4 milliLiter(s) Inhalation every 6 hours  albuterol/ipratropium for Nebulization 3 milliLiter(s) Nebulizer every 6 hours  artificial  tears Solution 1 Drop(s) Both EYES two times a day  aspirin  chewable 81 milliGRAM(s) Enteral Tube every 24 hours  caspofungin IVPB 50 milliGRAM(s) IV Intermittent every 24 hours  chlorhexidine 0.12% Liquid 15 milliLiter(s) Oral Mucosa every 12 hours  chlorhexidine 2% Cloths 1 Application(s) Topical daily  dexMEDEtomidine Infusion 1 MICROgram(s)/kG/Hr (17.5 mL/Hr) IV Continuous <Continuous>  dextrose 5%. 1000 milliLiter(s) (100 mL/Hr) IV Continuous <Continuous>  dextrose 50% Injectable 25 Gram(s) IV Push once  fentaNYL   Infusion... 0.5 MICROgram(s)/kG/Hr (1.75 mL/Hr) IV Continuous <Continuous>  glucagon  Injectable 1 milliGRAM(s) IntraMuscular once  heparin   Injectable 5000 Unit(s) SubCutaneous every 8 hours  HYDROmorphone  Injectable 3 milliGRAM(s) IV Push every 4 hours  insulin lispro (ADMELOG) corrective regimen sliding scale   SubCutaneous every 6 hours  ketamine Infusion 0.2 mG/kG/Hr (7 mL/Hr) IV Continuous <Continuous>  lacosamide IVPB 250 milliGRAM(s) IV Intermittent every 12 hours  meropenem  IVPB 1000 milliGRAM(s) IV Intermittent every 8 hours  norepinephrine Infusion 0.05 MICROgram(s)/kG/Min (6.56 mL/Hr) IV Continuous <Continuous>  pantoprazole  Injectable 40 milliGRAM(s) IV Push daily  Parenteral Nutrition - Adult 1 Each (70 mL/Hr) TPN Continuous <Continuous>  sodium chloride 3%  Inhalation 4 milliLiter(s) Inhalation every 6 hours    MEDICATIONS  (PRN):  dextrose Oral Gel 15 Gram(s) Oral once PRN Blood Glucose LESS THAN 70 milliGRAM(s)/deciliter  sodium chloride 0.9% lock flush 10 milliLiter(s) IV Push every 1 hour PRN Pre/post blood products, medications, blood draw, and to maintain line patency      Vital Signs Last 24 Hrs  T(C): 37.8 (10 Vazquez 2023 05:10), Max: 38.7 (09 Jun 2023 14:00)  T(F): 100.1 (10 Vazquez 2023 05:10), Max: 101.6 (09 Jun 2023 14:00)  HR: 100 (10 Vazquez 2023 07:00) (85 - 149)  BP: --  BP(mean): --  RR: 12 (10 Vazquez 2023 07:00) (11 - 22)  SpO2: 100% (10 Vazquez 2023 07:00) (96% - 100%)    Parameters below as of 10 Vazquez 2023 07:00  Patient On (Oxygen Delivery Method): ventilator    O2 Concentration (%): 40      General: NAD, resting comfortably in bed  C/V: NSR, s1 s2  Pulm: Trach to vent, nonlabored breathing, no respiratory distress  Abd: Soft, ND, mildly tender, drains unchanged from prior exam, serosanguinous except for hematoma drain which is bilious  Extrem: [facility]    I&O's Summary    09 Jun 2023 07:01  -  10 Vazquez 2023 07:00  --------------------------------------------------------  IN: 4334.3 mL / OUT: 5434 mL / NET: -1099.7 mL        LABS:                        7.5    9.00  )-----------( 143      ( 10 Vazquez 2023 05:12 )             23.9     06-10    133<L>  |  101  |  43<H>  ----------------------------<  124<H>  4.4   |  25  |  0.76    Ca    8.2<L>      10 Vazquez 2023 05:12  Phos  4.1     06-10  Mg     2.0     06-10    TPro  5.5<L>  /  Alb  2.0<L>  /  TBili  0.7  /  DBili  x   /  AST  16  /  ALT  13  /  AlkPhos  209<H>  06-10    PT/INR - ( 09 Jun 2023 04:59 )   PT: 15.8 sec;   INR: 1.32          PTT - ( 09 Jun 2023 04:59 )  PTT:35.0 sec    CAPILLARY BLOOD GLUCOSE      POCT Blood Glucose.: 131 mg/dL (10 Vazquez 2023 07:11)  POCT Blood Glucose.: 116 mg/dL (10 Vazquez 2023 00:41)  POCT Blood Glucose.: 96 mg/dL (09 Jun 2023 17:42)  POCT Blood Glucose.: 132 mg/dL (09 Jun 2023 11:55)    LIVER FUNCTIONS - ( 10 Vazquez 2023 05:12 )  Alb: 2.0 g/dL / Pro: 5.5 g/dL / ALK PHOS: 209 U/L / ALT: 13 U/L / AST: 16 U/L / GGT: x             RADIOLOGY & ADDITIONAL STUDIES:

## 2023-06-10 NOTE — CHART NOTE - NSCHARTNOTEFT_GEN_A_CORE
Infectious Diseases Anti-infective Approval Note    Medication:  Meropenem  Dose:  1 g  Route:  IV  Frequency:  q8h  Duration**:  3 d    Dose may be adjusted as needed for alterations in renal function.    *THIS IS NOT AN INFECTIOUS DISEASES CONSULTATION*    **Indicates duration of approval, not necessarily duration of treatment

## 2023-06-10 NOTE — PROGRESS NOTE ADULT - ATTENDING COMMENTS
Patient seen and examined with house-staff during bedside rounds.  Resident note read, including vitals, physical findings, laboratory data, and radiological reports.   Revisions included below.  Direct personal management at bed side and extensive interpretation of the data.  Plan was outlined and discussed in details with the housestaff.  Decision making of high complexity  Action taken for acute disease activity to reflect the level of care provided:  - medication reconciliation  - review laboratory data  The patient is clinically stable.  The patient on the pressure support ventilation.  The patient on aqua pheresis.  As per renal we will hold aqua pheresis on trial of Lasix.  Patient is on TPN Precedex fentanyl and will wean her fentanyl as tolerated.  Hemoglobin is stable.  Chest x-ray were unremarkable.  Follow-up on culture from bronchoscopy.  ID evaluation of the antibiotic for

## 2023-06-10 NOTE — PROGRESS NOTE ADULT - ASSESSMENT
53 year old male with PMH of HTN, Type A Aortic dissection s/p Dacron grafts, AV resuspension (2013), CAD s/p CABG x 1 SVG to RCA (2013, Dr. Medina), seizure disorder, with recent admit (Mar/Apr) for replacement of transverse aortic arch, second stage TEVAR and aoto-axillary bypass, AV replacement, and CABG x1. Post op course c/b shock, TREY requiring CVVHD, dialysis. Readmitted on 4/27 ([facility] ED) for syncope at home where imaging revealed graft endoleak and pancreatitis. Patient taken back to OR 5/5 for TEVAR, with post op course now complicated by Klebseilla bacteremia (5/15), respiratory failure requiring intubation (5/18), and bronch and PEA arrest (5/19), with respiratory cultures revealing Enterobacter, E. Faecalis and Strep angionosus. Surgery reconsulted re: active hemorrhagic pancreatitis on CT (5/13), with subsequent IR aspiration of peripancreatic fluid collection (15cc, Kleb) and paracentesis - 4L (5/22). Patient started on CVVHD 5/22. Transferred to SICU (5/25), with catheter exchange and two additional drains placed on 5/26 in IR and exlap, wash out and new drain placement on 5/31. The patient returned to the OR 6/1 for evacuation of blood and placement of J tube. Patient failed extubation on 6/3 and received trach on 6/5. Noted to be febrile overnight 6/7, 6/8, 6/9. CT Ch/Ab/Pelvis overnight 6/7 with stable abdominal collections and atelectasis of LLL.       Plan    NEURO:  S/p trach: wean sedation/Precedex gtt for RASS -1/0 as tolerated. Wean Fentanyl gtt (1 mcg/kg/hr) as tolerated and keep Ketamine gtt at 0.15 mcg/kg/hr. Hydromorphone 3mg IVP q 4 hours standing for additional pain management. Continue 250mg Lacosamide IV. Neuro checks per protocol and prn. Continue EEG with transition, discontinuation of Depakote.   HEENT: Artificial tears as needed.  CV: Maintain MAP > 65, titrate Norepinephrine gtt as tolerated.   PULM: Continue CPAP 5/5 40% as tolerated. AC 40/500/12/5 as needed. Pulmonary toileting. Continue Duonebs, Mucomyst, and 3% Saline neb q 6 hours.   GI/FEN: Continue TPN @ 70cc/hr. Increase TF to 50cc/hr (Jevity) as tolerated and per surgery recs. Monitor drain output. Continue Protonix 40mg BID.  : TREY. Aquaphoresis, goal net negative. Continue to monitor U/O.  HEME: Subq Heparin. Platelets stable. Follow up DVT/Doppler studies. Maintain current T&S. Hold ASA (H/H, tachycardia).  ENDO: Sliding scale per TPN protocol.  ID: Continue 2g Ceftriaxone OD for Kleb bacteremia. No growth in cultures.   PPX: SCDs. Heparin.  LINES: R radial (6/7), Left subclavian HD Cath (5/31), RIJ TLC (6/1) LIJ TLC (5/23)  WOUNDS/DRAINS: Right OR CODIE, Left OR CODIE, left IR hematoma drain, left IR pancreatic drain. Continue to monitor output and flush IR/panc drain q shift 250cc.   PT: OOB to chair.  Dispo: SICU

## 2023-06-10 NOTE — PROGRESS NOTE ADULT - ASSESSMENT
52 Y Male w/ HTN, aortic dissection previous admission with severe cardiogenic shock and oliguric TREY requiring CVVHD initiation 5/22. Presented to the ED 5/5 with worsening epigastric pain CTA/P revealed continued endoleak hospital course complicated by necrotic/hemorrhagic pancreatitis. Nephrology following for renal failure (initially oliguric, now non-oliguric since 6/5) required CVVHD, now off of HD since 6/7    #non-oliguric ATN 2/2 cardiogenic shock    Recommend:  Would consider trial of lasix can give 100mg IV lasix and monitor response, if adequate can continue on standing lasix, however if suboptimal can continue with AQ    More hemodynamically stable now, has been on AQ at 60ml/hr since 6/8, with 2L UP and net neg 600  Maintain MAP > 70 for adequate renal perfusion  Strict i's and o's

## 2023-06-10 NOTE — PROGRESS NOTE ADULT - SUBJECTIVE AND OBJECTIVE BOX
Patient is a 53y Male seen and evaluated at bedside.       Meds:    acetaminophen   Oral Liquid .. 975 every 6 hours  acetylcysteine 20%  Inhalation 4 every 6 hours  albuterol/ipratropium for Nebulization 3 every 6 hours  artificial  tears Solution 1 two times a day  aspirin  chewable 81 every 24 hours  caspofungin IVPB 50 every 24 hours  chlorhexidine 0.12% Liquid 15 every 12 hours  chlorhexidine 2% Cloths 1 daily  dexMEDEtomidine Infusion 1 <Continuous>  dextrose 5%. 1000 <Continuous>  dextrose 50% Injectable 25 once  dextrose Oral Gel 15 once PRN  fentaNYL   Infusion... 0.3 <Continuous>  glucagon  Injectable 1 once  heparin   Injectable 5000 every 8 hours  HYDROmorphone  Injectable 3 every 4 hours  insulin lispro (ADMELOG) corrective regimen sliding scale  every 6 hours  ketamine Infusion 0.2 <Continuous>  lacosamide IVPB 250 every 12 hours  meropenem  IVPB 1000 every 8 hours  norepinephrine Infusion 0.05 <Continuous>  pantoprazole  Injectable 40 daily  Parenteral Nutrition - Adult 1 <Continuous>  Parenteral Nutrition - Adult 1 <Continuous>  sodium chloride 0.9% lock flush 10 every 1 hour PRN  sodium chloride 3%  Inhalation 4 every 6 hours      T(C): , Max: 38.7 (06-09-23 @ 14:00)  T(F): , Max: 101.6 (06-09-23 @ 14:00)  HR: 94 (06-10-23 @ 10:00)  BP: --  BP(mean): --  RR: 11 (06-10-23 @ 10:00)  SpO2: 100% (06-10-23 @ 10:00)  Wt(kg): --    06-09 @ 07:01  -  06-10 @ 07:00  --------------------------------------------------------  IN: 4334.3 mL / OUT: 5434 mL / NET: -1099.7 mL    06-10 @ 07:01  -  06-10 @ 11:18  --------------------------------------------------------  IN: 691.7 mL / OUT: 120 mL / NET: 571.7 mL          Review of Systems:  ROS negative except as per HPI      PHYSICAL EXAM:  GENERAL: intubated and sedated  CHEST/LUNG: mechanical breath sounds  HEART: normal S1S2, RRR  ABDOMEN: Soft, Nontender, +BS,   EXTREMITIES: +2 edema BL LE UE   ACCESS: L subclavian HD cath (placed 5/31)    LABS:                        7.5    9.00  )-----------( 143      ( 10 Vazquez 2023 05:12 )             23.9     06-10    133<L>  |  101  |  43<H>  ----------------------------<  124<H>  4.4   |  25  |  0.76    Ca    8.2<L>      10 Vazquez 2023 05:12  Phos  4.1     06-10  Mg     2.0     06-10    TPro  5.5<L>  /  Alb  2.0<L>  /  TBili  0.7  /  DBili  x   /  AST  16  /  ALT  13  /  AlkPhos  209<H>  06-10      PT/INR - ( 09 Jun 2023 04:59 )   PT: 15.8 sec;   INR: 1.32          PTT - ( 09 Jun 2023 04:59 )  PTT:35.0 sec          RADIOLOGY & ADDITIONAL STUDIES:           Patient is a 53y Male seen and evaluated at bedside. Seen on AQ with  stable sCr,       Meds:    acetaminophen   Oral Liquid .. 975 every 6 hours  acetylcysteine 20%  Inhalation 4 every 6 hours  albuterol/ipratropium for Nebulization 3 every 6 hours  artificial  tears Solution 1 two times a day  aspirin  chewable 81 every 24 hours  caspofungin IVPB 50 every 24 hours  chlorhexidine 0.12% Liquid 15 every 12 hours  chlorhexidine 2% Cloths 1 daily  dexMEDEtomidine Infusion 1 <Continuous>  dextrose 5%. 1000 <Continuous>  dextrose 50% Injectable 25 once  dextrose Oral Gel 15 once PRN  fentaNYL   Infusion... 0.3 <Continuous>  glucagon  Injectable 1 once  heparin   Injectable 5000 every 8 hours  HYDROmorphone  Injectable 3 every 4 hours  insulin lispro (ADMELOG) corrective regimen sliding scale  every 6 hours  ketamine Infusion 0.2 <Continuous>  lacosamide IVPB 250 every 12 hours  meropenem  IVPB 1000 every 8 hours  norepinephrine Infusion 0.05 <Continuous>  pantoprazole  Injectable 40 daily  Parenteral Nutrition - Adult 1 <Continuous>  Parenteral Nutrition - Adult 1 <Continuous>  sodium chloride 0.9% lock flush 10 every 1 hour PRN  sodium chloride 3%  Inhalation 4 every 6 hours      T(C): , Max: 38.7 (06-09-23 @ 14:00)  T(F): , Max: 101.6 (06-09-23 @ 14:00)  HR: 94 (06-10-23 @ 10:00)  BP: --  BP(mean): --  RR: 11 (06-10-23 @ 10:00)  SpO2: 100% (06-10-23 @ 10:00)  Wt(kg): --    06-09 @ 07:01  -  06-10 @ 07:00  --------------------------------------------------------  IN: 4334.3 mL / OUT: 5434 mL / NET: -1099.7 mL    06-10 @ 07:01  -  06-10 @ 11:18  --------------------------------------------------------  IN: 691.7 mL / OUT: 120 mL / NET: 571.7 mL          Review of Systems:  ROS negative except as per HPI      PHYSICAL EXAM:  GENERAL: intubated and sedated  CHEST/LUNG: mechanical breath sounds  HEART: normal S1S2, RRR  ABDOMEN: Soft, Nontender, +BS,   EXTREMITIES: +2 edema BL LE UE   ACCESS: L subclavian HD cath (placed 5/31)    LABS:                        7.5    9.00  )-----------( 143      ( 10 Vazquez 2023 05:12 )             23.9     06-10    133<L>  |  101  |  43<H>  ----------------------------<  124<H>  4.4   |  25  |  0.76    Ca    8.2<L>      10 Vazquez 2023 05:12  Phos  4.1     06-10  Mg     2.0     06-10    TPro  5.5<L>  /  Alb  2.0<L>  /  TBili  0.7  /  DBili  x   /  AST  16  /  ALT  13  /  AlkPhos  209<H>  06-10      PT/INR - ( 09 Jun 2023 04:59 )   PT: 15.8 sec;   INR: 1.32          PTT - ( 09 Jun 2023 04:59 )  PTT:35.0 sec          RADIOLOGY & ADDITIONAL STUDIES:

## 2023-06-10 NOTE — PROGRESS NOTE ADULT - SUBJECTIVE AND OBJECTIVE BOX
ON: Bronch - purulent in LLL, rare gram negative rods, added vanc, broaded to shin for psudomonal coverage, MRSA swab negative, proalcitonin 2.9, lactate 1, bleeding around trach - placed surgicel, levo since 1am, making urine!!  6/9: CPAP 0/8. tachycardic to 140's, sinus, non-tachypneic, temp only 99.7, POCUS IVC collapsed, given albumin 5% x 1, Febrile to 101.6- back on levo, bld cx, fungal cx, fungitell & sputum cx sent sent, start Caspo. incr TF to 40/hr. incr ketamine to 0.2mg, decr fent to 0.5mcg, restart ASA, dilaudid 2mg for breakthrough pain. Versed 2mg x1 for bronch.     SUBJECTIVE: Arousable, nodding head when asked questions, no pain at the time of interview.    MEDICATIONS  (STANDING):  acetaminophen   Oral Liquid .. 975 milliGRAM(s) Enteral Tube every 6 hours  acetylcysteine 20%  Inhalation 4 milliLiter(s) Inhalation every 6 hours  albuterol/ipratropium for Nebulization 3 milliLiter(s) Nebulizer every 6 hours  artificial  tears Solution 1 Drop(s) Both EYES two times a day  aspirin  chewable 81 milliGRAM(s) Enteral Tube every 24 hours  caspofungin IVPB 50 milliGRAM(s) IV Intermittent every 24 hours  chlorhexidine 0.12% Liquid 15 milliLiter(s) Oral Mucosa every 12 hours  chlorhexidine 2% Cloths 1 Application(s) Topical daily  dexMEDEtomidine Infusion 1 MICROgram(s)/kG/Hr (17.5 mL/Hr) IV Continuous <Continuous>  dextrose 5%. 1000 milliLiter(s) (100 mL/Hr) IV Continuous <Continuous>  dextrose 50% Injectable 25 Gram(s) IV Push once  fentaNYL   Infusion... 0.5 MICROgram(s)/kG/Hr (1.75 mL/Hr) IV Continuous <Continuous>  glucagon  Injectable 1 milliGRAM(s) IntraMuscular once  heparin   Injectable 5000 Unit(s) SubCutaneous every 8 hours  HYDROmorphone  Injectable 3 milliGRAM(s) IV Push every 4 hours  insulin lispro (ADMELOG) corrective regimen sliding scale   SubCutaneous every 6 hours  ketamine Infusion 0.2 mG/kG/Hr (7 mL/Hr) IV Continuous <Continuous>  lacosamide IVPB 250 milliGRAM(s) IV Intermittent every 12 hours  meropenem  IVPB 1000 milliGRAM(s) IV Intermittent every 8 hours  norepinephrine Infusion 0.05 MICROgram(s)/kG/Min (6.56 mL/Hr) IV Continuous <Continuous>  pantoprazole  Injectable 40 milliGRAM(s) IV Push daily  Parenteral Nutrition - Adult 1 Each (31 mL/Hr) TPN Continuous <Continuous>  Parenteral Nutrition - Adult 1 Each (70 mL/Hr) TPN Continuous <Continuous>  sodium chloride 3%  Inhalation 4 milliLiter(s) Inhalation every 6 hours    MEDICATIONS  (PRN):  dextrose Oral Gel 15 Gram(s) Oral once PRN Blood Glucose LESS THAN 70 milliGRAM(s)/deciliter  sodium chloride 0.9% lock flush 10 milliLiter(s) IV Push every 1 hour PRN Pre/post blood products, medications, blood draw, and to maintain line patency      Drips:     ICU Vital Signs Last 24 Hrs  T(C): 37.3 (10 Vazquez 2023 09:33), Max: 38.7 (09 Jun 2023 14:00)  T(F): 99.2 (10 Vazquez 2023 09:33), Max: 101.6 (09 Jun 2023 14:00)  HR: 94 (10 Vazquez 2023 10:00) (85 - 149)  BP: --  BP(mean): --  ABP: 125/57 (10 Vazquez 2023 10:00) (88/42 - 159/92)  ABP(mean): 83 (10 Vazquez 2023 10:00) (58 - 115)  RR: 11 (10 Vazquez 2023 10:00) (11 - 22)  SpO2: 100% (10 Vazquez 2023 10:00) (98% - 100%)    O2 Parameters below as of 10 Vazquez 2023 10:00  Patient On (Oxygen Delivery Method): ventilator, CPAP Mode    O2 Concentration (%): 40        Physical Exam:    General: NAD, resting comfortably in bed  HEENT: NC/AT, EOMI, PERRLA  Pulmonary: Trach to vent, nonlabored breathing, no respiratory distress  Cardiovascular: NSR, s1 s2  Abdominal: Soft, NTND, drains continue to be serosanguinous except for hematoma drain which is still bilious but improving  Extremities: WWP  Neuro: Arousable, following commands    Lines/tubes/drains:  Cantrell:	      Vent settings:  Mode: AC/ CMV (Assist Control/ Continuous Mandatory Ventilation), RR (machine): 12, TV (machine): 500, FiO2: 40, PEEP: 8, PS: 8, ITime: 1, MAP: 9.9, PIP: 13    I&O's Summary    09 Jun 2023 07:01  -  10 Vazquez 2023 07:00  --------------------------------------------------------  IN: 4334.3 mL / OUT: 5434 mL / NET: -1099.7 mL    10 Vazquez 2023 07:01  -  10 Vazquez 2023 10:15  --------------------------------------------------------  IN: 508.4 mL / OUT: 120 mL / NET: 388.4 mL        LABS:                        7.5    9.00  )-----------( 143      ( 10 Vazquez 2023 05:12 )             23.9     06-10    133<L>  |  101  |  43<H>  ----------------------------<  124<H>  4.4   |  25  |  0.76    Ca    8.2<L>      10 Vazquez 2023 05:12  Phos  4.1     06-10  Mg     2.0     06-10    TPro  5.5<L>  /  Alb  2.0<L>  /  TBili  0.7  /  DBili  x   /  AST  16  /  ALT  13  /  AlkPhos  209<H>  06-10    PT/INR - ( 09 Jun 2023 04:59 )   PT: 15.8 sec;   INR: 1.32          PTT - ( 09 Jun 2023 04:59 )  PTT:35.0 sec    CAPILLARY BLOOD GLUCOSE      POCT Blood Glucose.: 131 mg/dL (10 Vazquez 2023 07:11)  POCT Blood Glucose.: 116 mg/dL (10 Vazquez 2023 00:41)  POCT Blood Glucose.: 96 mg/dL (09 Jun 2023 17:42)  POCT Blood Glucose.: 132 mg/dL (09 Jun 2023 11:55)    LIVER FUNCTIONS - ( 10 Vazquez 2023 05:12 )  Alb: 2.0 g/dL / Pro: 5.5 g/dL / ALK PHOS: 209 U/L / ALT: 13 U/L / AST: 16 U/L / GGT: x             Cultures:Culture Results:   No growth at 12 hours (06-09 @ 13:58)  Culture Results:   No growth at 12 hours (06-09 @ 13:58)  Culture Results:   Sputum specimen rejected.  Microscopic examination indicates  oropharyngeal contamination.  Please repeat. (06-09 @ 13:32)  Culture Results:   No growth at 1 day. (06-09 @ 02:36)      RADIOLOGY & ADDITIONAL STUDIES:

## 2023-06-11 LAB
ALBUMIN SERPL ELPH-MCNC: 2.2 G/DL — LOW (ref 3.3–5)
ALP SERPL-CCNC: 189 U/L — HIGH (ref 40–120)
ALT FLD-CCNC: 12 U/L — SIGNIFICANT CHANGE UP (ref 10–45)
ANION GAP SERPL CALC-SCNC: 8 MMOL/L — SIGNIFICANT CHANGE UP (ref 5–17)
AST SERPL-CCNC: 14 U/L — SIGNIFICANT CHANGE UP (ref 10–40)
BASE EXCESS BLDA CALC-SCNC: 4.2 MMOL/L — HIGH (ref -2–3)
BASOPHILS # BLD AUTO: 0.22 K/UL — HIGH (ref 0–0.2)
BASOPHILS NFR BLD AUTO: 2.5 % — HIGH (ref 0–2)
BILIRUB SERPL-MCNC: 0.7 MG/DL — SIGNIFICANT CHANGE UP (ref 0.2–1.2)
BUN SERPL-MCNC: 46 MG/DL — HIGH (ref 7–23)
CALCIUM SERPL-MCNC: 8.3 MG/DL — LOW (ref 8.4–10.5)
CHLORIDE SERPL-SCNC: 102 MMOL/L — SIGNIFICANT CHANGE UP (ref 96–108)
CO2 BLDA-SCNC: 31 MMOL/L — HIGH (ref 19–24)
CO2 SERPL-SCNC: 28 MMOL/L — SIGNIFICANT CHANGE UP (ref 22–31)
CREAT SERPL-MCNC: 0.78 MG/DL — SIGNIFICANT CHANGE UP (ref 0.5–1.3)
CULTURE RESULTS: SIGNIFICANT CHANGE UP
CULTURE RESULTS: SIGNIFICANT CHANGE UP
EGFR: 106 ML/MIN/1.73M2 — SIGNIFICANT CHANGE UP
EOSINOPHIL # BLD AUTO: 0.22 K/UL — SIGNIFICANT CHANGE UP (ref 0–0.5)
EOSINOPHIL NFR BLD AUTO: 2.5 % — SIGNIFICANT CHANGE UP (ref 0–6)
GLUCOSE BLDC GLUCOMTR-MCNC: 102 MG/DL — HIGH (ref 70–99)
GLUCOSE BLDC GLUCOMTR-MCNC: 102 MG/DL — HIGH (ref 70–99)
GLUCOSE BLDC GLUCOMTR-MCNC: 111 MG/DL — HIGH (ref 70–99)
GLUCOSE BLDC GLUCOMTR-MCNC: 114 MG/DL — HIGH (ref 70–99)
GLUCOSE BLDC GLUCOMTR-MCNC: 124 MG/DL — HIGH (ref 70–99)
GLUCOSE BLDC GLUCOMTR-MCNC: 91 MG/DL — SIGNIFICANT CHANGE UP (ref 70–99)
GLUCOSE SERPL-MCNC: 115 MG/DL — HIGH (ref 70–99)
HCO3 BLDA-SCNC: 29 MMOL/L — HIGH (ref 21–28)
HCT VFR BLD CALC: 23.9 % — LOW (ref 39–50)
HGB BLD-MCNC: 7.6 G/DL — LOW (ref 13–17)
HYPOCHROMIA BLD QL: SLIGHT — SIGNIFICANT CHANGE UP
LYMPHOCYTES # BLD AUTO: 1.1 K/UL — SIGNIFICANT CHANGE UP (ref 1–3.3)
LYMPHOCYTES # BLD AUTO: 12.5 % — LOW (ref 13–44)
MAGNESIUM SERPL-MCNC: 2.2 MG/DL — SIGNIFICANT CHANGE UP (ref 1.6–2.6)
MANUAL SMEAR VERIFICATION: SIGNIFICANT CHANGE UP
MCHC RBC-ENTMCNC: 29.5 PG — SIGNIFICANT CHANGE UP (ref 27–34)
MCHC RBC-ENTMCNC: 31.8 GM/DL — LOW (ref 32–36)
MCV RBC AUTO: 92.6 FL — SIGNIFICANT CHANGE UP (ref 80–100)
MONOCYTES # BLD AUTO: 0 K/UL — SIGNIFICANT CHANGE UP (ref 0–0.9)
MONOCYTES NFR BLD AUTO: 0 % — LOW (ref 2–14)
NEUTROPHILS # BLD AUTO: 7.23 K/UL — SIGNIFICANT CHANGE UP (ref 1.8–7.4)
NEUTROPHILS NFR BLD AUTO: 82.5 % — HIGH (ref 43–77)
NRBC # BLD: 10 /100 — HIGH (ref 0–0)
NRBC # BLD: SIGNIFICANT CHANGE UP /100 WBCS (ref 0–0)
OVALOCYTES BLD QL SMEAR: SLIGHT — SIGNIFICANT CHANGE UP
PCO2 BLDA: 44 MMHG — SIGNIFICANT CHANGE UP (ref 35–48)
PH BLDA: 7.43 — SIGNIFICANT CHANGE UP (ref 7.35–7.45)
PHOSPHATE SERPL-MCNC: 4.8 MG/DL — HIGH (ref 2.5–4.5)
PLAT MORPH BLD: ABNORMAL
PLATELET # BLD AUTO: 207 K/UL — SIGNIFICANT CHANGE UP (ref 150–400)
PO2 BLDA: 97 MMHG — SIGNIFICANT CHANGE UP (ref 83–108)
POIKILOCYTOSIS BLD QL AUTO: SLIGHT — SIGNIFICANT CHANGE UP
POTASSIUM SERPL-MCNC: 5 MMOL/L — SIGNIFICANT CHANGE UP (ref 3.5–5.3)
POTASSIUM SERPL-SCNC: 5 MMOL/L — SIGNIFICANT CHANGE UP (ref 3.5–5.3)
PROT SERPL-MCNC: 5.9 G/DL — LOW (ref 6–8.3)
RBC # BLD: 2.58 M/UL — LOW (ref 4.2–5.8)
RBC # FLD: 15.5 % — HIGH (ref 10.3–14.5)
RBC BLD AUTO: ABNORMAL
SAO2 % BLDA: 98.6 % — HIGH (ref 94–98)
SMUDGE CELLS # BLD: PRESENT — SIGNIFICANT CHANGE UP
SODIUM SERPL-SCNC: 138 MMOL/L — SIGNIFICANT CHANGE UP (ref 135–145)
SPECIMEN SOURCE: SIGNIFICANT CHANGE UP
SPECIMEN SOURCE: SIGNIFICANT CHANGE UP
SPHEROCYTES BLD QL SMEAR: SLIGHT — SIGNIFICANT CHANGE UP
WBC # BLD: 8.76 K/UL — SIGNIFICANT CHANGE UP (ref 3.8–10.5)
WBC # FLD AUTO: 8.76 K/UL — SIGNIFICANT CHANGE UP (ref 3.8–10.5)

## 2023-06-11 PROCEDURE — 71045 X-RAY EXAM CHEST 1 VIEW: CPT | Mod: 26

## 2023-06-11 PROCEDURE — 99233 SBSQ HOSP IP/OBS HIGH 50: CPT | Mod: GC

## 2023-06-11 RX ORDER — FUROSEMIDE 40 MG
40 TABLET ORAL DAILY
Refills: 0 | Status: DISCONTINUED | OUTPATIENT
Start: 2023-06-11 | End: 2023-06-12

## 2023-06-11 RX ORDER — FUROSEMIDE 40 MG
40 TABLET ORAL ONCE
Refills: 0 | Status: COMPLETED | OUTPATIENT
Start: 2023-06-11 | End: 2023-06-11

## 2023-06-11 RX ORDER — ELECTROLYTE SOLUTION,INJ
1 VIAL (ML) INTRAVENOUS
Refills: 0 | Status: DISCONTINUED | OUTPATIENT
Start: 2023-06-11 | End: 2023-06-11

## 2023-06-11 RX ORDER — NOREPINEPHRINE BITARTRATE/D5W 8 MG/250ML
0.05 PLASTIC BAG, INJECTION (ML) INTRAVENOUS
Qty: 8 | Refills: 0 | Status: DISCONTINUED | OUTPATIENT
Start: 2023-06-11 | End: 2023-06-12

## 2023-06-11 RX ORDER — HYDROMORPHONE HYDROCHLORIDE 2 MG/ML
2 INJECTION INTRAMUSCULAR; INTRAVENOUS; SUBCUTANEOUS ONCE
Refills: 0 | Status: DISCONTINUED | OUTPATIENT
Start: 2023-06-11 | End: 2023-06-11

## 2023-06-11 RX ADMIN — CHLORHEXIDINE GLUCONATE 1 APPLICATION(S): 213 SOLUTION TOPICAL at 11:50

## 2023-06-11 RX ADMIN — Medication 40 MILLIGRAM(S): at 15:07

## 2023-06-11 RX ADMIN — Medication 4 MILLILITER(S): at 15:09

## 2023-06-11 RX ADMIN — Medication 975 MILLIGRAM(S): at 23:55

## 2023-06-11 RX ADMIN — Medication 1 EACH: at 17:42

## 2023-06-11 RX ADMIN — HEPARIN SODIUM 5000 UNIT(S): 5000 INJECTION INTRAVENOUS; SUBCUTANEOUS at 13:04

## 2023-06-11 RX ADMIN — HEPARIN SODIUM 5000 UNIT(S): 5000 INJECTION INTRAVENOUS; SUBCUTANEOUS at 21:08

## 2023-06-11 RX ADMIN — Medication 1 DROP(S): at 17:42

## 2023-06-11 RX ADMIN — LACOSAMIDE 150 MILLIGRAM(S): 50 TABLET ORAL at 17:56

## 2023-06-11 RX ADMIN — Medication 975 MILLIGRAM(S): at 10:00

## 2023-06-11 RX ADMIN — SODIUM CHLORIDE 4 MILLILITER(S): 9 INJECTION INTRAMUSCULAR; INTRAVENOUS; SUBCUTANEOUS at 22:03

## 2023-06-11 RX ADMIN — HEPARIN SODIUM 5000 UNIT(S): 5000 INJECTION INTRAVENOUS; SUBCUTANEOUS at 05:17

## 2023-06-11 RX ADMIN — Medication 3 MILLILITER(S): at 09:09

## 2023-06-11 RX ADMIN — Medication 3 MILLILITER(S): at 22:03

## 2023-06-11 RX ADMIN — Medication 4 MILLILITER(S): at 22:03

## 2023-06-11 RX ADMIN — HYDROMORPHONE HYDROCHLORIDE 2 MILLIGRAM(S): 2 INJECTION INTRAMUSCULAR; INTRAVENOUS; SUBCUTANEOUS at 16:29

## 2023-06-11 RX ADMIN — CHLORHEXIDINE GLUCONATE 15 MILLILITER(S): 213 SOLUTION TOPICAL at 17:41

## 2023-06-11 RX ADMIN — PANTOPRAZOLE SODIUM 40 MILLIGRAM(S): 20 TABLET, DELAYED RELEASE ORAL at 11:02

## 2023-06-11 RX ADMIN — Medication 3 MILLILITER(S): at 04:06

## 2023-06-11 RX ADMIN — Medication 975 MILLIGRAM(S): at 11:37

## 2023-06-11 RX ADMIN — HYDROMORPHONE HYDROCHLORIDE 3 MILLIGRAM(S): 2 INJECTION INTRAMUSCULAR; INTRAVENOUS; SUBCUTANEOUS at 22:31

## 2023-06-11 RX ADMIN — DEXMEDETOMIDINE HYDROCHLORIDE IN 0.9% SODIUM CHLORIDE 17.5 MICROGRAM(S)/KG/HR: 4 INJECTION INTRAVENOUS at 14:16

## 2023-06-11 RX ADMIN — Medication 1 DROP(S): at 05:36

## 2023-06-11 RX ADMIN — HYDROMORPHONE HYDROCHLORIDE 3 MILLIGRAM(S): 2 INJECTION INTRAMUSCULAR; INTRAVENOUS; SUBCUTANEOUS at 23:55

## 2023-06-11 RX ADMIN — MEROPENEM 100 MILLIGRAM(S): 1 INJECTION INTRAVENOUS at 21:09

## 2023-06-11 RX ADMIN — HYDROMORPHONE HYDROCHLORIDE 3 MILLIGRAM(S): 2 INJECTION INTRAMUSCULAR; INTRAVENOUS; SUBCUTANEOUS at 18:43

## 2023-06-11 RX ADMIN — Medication 40 MILLIGRAM(S): at 23:40

## 2023-06-11 RX ADMIN — Medication 3 MILLILITER(S): at 15:09

## 2023-06-11 RX ADMIN — Medication 4 MILLILITER(S): at 09:09

## 2023-06-11 RX ADMIN — Medication 975 MILLIGRAM(S): at 15:08

## 2023-06-11 RX ADMIN — KETAMINE HYDROCHLORIDE 9.1 MG/KG/HR: 100 INJECTION INTRAMUSCULAR; INTRAVENOUS at 21:11

## 2023-06-11 RX ADMIN — Medication 975 MILLIGRAM(S): at 05:17

## 2023-06-11 RX ADMIN — MEROPENEM 100 MILLIGRAM(S): 1 INJECTION INTRAVENOUS at 13:09

## 2023-06-11 RX ADMIN — Medication 975 MILLIGRAM(S): at 16:08

## 2023-06-11 RX ADMIN — HYDROMORPHONE HYDROCHLORIDE 3 MILLIGRAM(S): 2 INJECTION INTRAMUSCULAR; INTRAVENOUS; SUBCUTANEOUS at 11:38

## 2023-06-11 RX ADMIN — CHLORHEXIDINE GLUCONATE 15 MILLILITER(S): 213 SOLUTION TOPICAL at 05:17

## 2023-06-11 RX ADMIN — HYDROMORPHONE HYDROCHLORIDE 2 MILLIGRAM(S): 2 INJECTION INTRAMUSCULAR; INTRAVENOUS; SUBCUTANEOUS at 16:50

## 2023-06-11 RX ADMIN — MEROPENEM 100 MILLIGRAM(S): 1 INJECTION INTRAVENOUS at 05:17

## 2023-06-11 RX ADMIN — Medication 81 MILLIGRAM(S): at 13:09

## 2023-06-11 RX ADMIN — HYDROMORPHONE HYDROCHLORIDE 3 MILLIGRAM(S): 2 INJECTION INTRAMUSCULAR; INTRAVENOUS; SUBCUTANEOUS at 13:09

## 2023-06-11 RX ADMIN — DEXMEDETOMIDINE HYDROCHLORIDE IN 0.9% SODIUM CHLORIDE 17.5 MICROGRAM(S)/KG/HR: 4 INJECTION INTRAVENOUS at 05:36

## 2023-06-11 RX ADMIN — Medication 4 MILLILITER(S): at 04:06

## 2023-06-11 RX ADMIN — HYDROMORPHONE HYDROCHLORIDE 3 MILLIGRAM(S): 2 INJECTION INTRAMUSCULAR; INTRAVENOUS; SUBCUTANEOUS at 13:24

## 2023-06-11 RX ADMIN — HYDROMORPHONE HYDROCHLORIDE 3 MILLIGRAM(S): 2 INJECTION INTRAMUSCULAR; INTRAVENOUS; SUBCUTANEOUS at 06:17

## 2023-06-11 RX ADMIN — HYDROMORPHONE HYDROCHLORIDE 3 MILLIGRAM(S): 2 INJECTION INTRAMUSCULAR; INTRAVENOUS; SUBCUTANEOUS at 10:38

## 2023-06-11 RX ADMIN — KETAMINE HYDROCHLORIDE 9.1 MG/KG/HR: 100 INJECTION INTRAMUSCULAR; INTRAVENOUS at 07:44

## 2023-06-11 RX ADMIN — CASPOFUNGIN ACETATE 260 MILLIGRAM(S): 7 INJECTION, POWDER, LYOPHILIZED, FOR SOLUTION INTRAVENOUS at 14:16

## 2023-06-11 RX ADMIN — HYDROMORPHONE HYDROCHLORIDE 3 MILLIGRAM(S): 2 INJECTION INTRAMUSCULAR; INTRAVENOUS; SUBCUTANEOUS at 01:51

## 2023-06-11 RX ADMIN — DEXMEDETOMIDINE HYDROCHLORIDE IN 0.9% SODIUM CHLORIDE 17.5 MICROGRAM(S)/KG/HR: 4 INJECTION INTRAVENOUS at 20:10

## 2023-06-11 RX ADMIN — DEXMEDETOMIDINE HYDROCHLORIDE IN 0.9% SODIUM CHLORIDE 17.5 MICROGRAM(S)/KG/HR: 4 INJECTION INTRAVENOUS at 00:44

## 2023-06-11 RX ADMIN — SODIUM CHLORIDE 4 MILLILITER(S): 9 INJECTION INTRAMUSCULAR; INTRAVENOUS; SUBCUTANEOUS at 15:08

## 2023-06-11 RX ADMIN — HYDROMORPHONE HYDROCHLORIDE 3 MILLIGRAM(S): 2 INJECTION INTRAMUSCULAR; INTRAVENOUS; SUBCUTANEOUS at 06:50

## 2023-06-11 RX ADMIN — LACOSAMIDE 150 MILLIGRAM(S): 50 TABLET ORAL at 06:28

## 2023-06-11 RX ADMIN — HYDROMORPHONE HYDROCHLORIDE 3 MILLIGRAM(S): 2 INJECTION INTRAMUSCULAR; INTRAVENOUS; SUBCUTANEOUS at 06:35

## 2023-06-11 RX ADMIN — HYDROMORPHONE HYDROCHLORIDE 3 MILLIGRAM(S): 2 INJECTION INTRAMUSCULAR; INTRAVENOUS; SUBCUTANEOUS at 02:05

## 2023-06-11 RX ADMIN — SODIUM CHLORIDE 4 MILLILITER(S): 9 INJECTION INTRAMUSCULAR; INTRAVENOUS; SUBCUTANEOUS at 04:05

## 2023-06-11 RX ADMIN — Medication 40 MILLIGRAM(S): at 08:52

## 2023-06-11 RX ADMIN — Medication 975 MILLIGRAM(S): at 06:00

## 2023-06-11 RX ADMIN — Medication 975 MILLIGRAM(S): at 21:08

## 2023-06-11 NOTE — PROGRESS NOTE ADULT - SUBJECTIVE AND OBJECTIVE BOX
Patient is a 53y Male seen and evaluated at bedside. No acute distress, given lasix 100mg IVP with good response, net neg 3.8 with 3L UO      Meds:    acetaminophen   Oral Liquid .. 975 every 6 hours  acetylcysteine 20%  Inhalation 4 every 6 hours  albuterol/ipratropium for Nebulization 3 every 6 hours  artificial  tears Solution 1 two times a day  aspirin  chewable 81 every 24 hours  caspofungin IVPB 50 every 24 hours  chlorhexidine 0.12% Liquid 15 every 12 hours  chlorhexidine 2% Cloths 1 daily  dexMEDEtomidine Infusion 1 <Continuous>  dextrose 5%. 1000 <Continuous>  dextrose 50% Injectable 25 once  dextrose Oral Gel 15 once PRN  fentaNYL   Infusion... 0.4 <Continuous>  glucagon  Injectable 1 once  heparin   Injectable 5000 every 8 hours  HYDROmorphone  Injectable 3 every 4 hours  insulin lispro (ADMELOG) corrective regimen sliding scale  every 6 hours  ketamine Infusion 0.2 <Continuous>  lacosamide IVPB 250 every 12 hours  meropenem  IVPB 1000 every 8 hours  norepinephrine Infusion 0.05 <Continuous>  pantoprazole  Injectable 40 daily  Parenteral Nutrition - Adult 1 <Continuous>  Parenteral Nutrition - Adult 1 <Continuous>  sodium chloride 0.9% lock flush 10 every 1 hour PRN  sodium chloride 3%  Inhalation 4 every 6 hours      T(C): , Max: 38.2 (06-10-23 @ 22:22)  T(F): , Max: 100.8 (06-10-23 @ 22:22)  HR: 117 (06-11-23 @ 11:00)  BP: --  BP(mean): --  RR: 16 (06-11-23 @ 11:00)  SpO2: 100% (06-11-23 @ 11:00)  Wt(kg): --    06-10 @ 07:01  -  06-11 @ 07:00  --------------------------------------------------------  IN: 3452.6 mL / OUT: 7298 mL / NET: -3845.4 mL    06-11 @ 07:01  -  06-11 @ 11:50  --------------------------------------------------------  IN: 196 mL / OUT: 883 mL / NET: -687 mL        Weight (kg): 91 (06-10 @ 21:18)    Review of Systems:  ROS negative except as per HPI      PHYSICAL EXAM:  GENERAL: intubated and sedated  CHEST/LUNG: mechanical breath sounds  HEART: normal S1S2, RRR  ABDOMEN: Soft, Nontender, +BS,   EXTREMITIES: +2 edema BL LE UE   ACCESS: L subclavian HD cath (placed 5/31)      LABS:                        7.6    8.76  )-----------( 207      ( 11 Jun 2023 05:12 )             23.9     06-11    138  |  102  |  46<H>  ----------------------------<  115<H>  5.0   |  28  |  0.78    Ca    8.3<L>      11 Jun 2023 05:12  Phos  4.8     06-11  Mg     2.2     06-11    TPro  5.9<L>  /  Alb  2.2<L>  /  TBili  0.7  /  DBili  x   /  AST  14  /  ALT  12  /  AlkPhos  189<H>  06-11                RADIOLOGY & ADDITIONAL STUDIES:

## 2023-06-11 NOTE — PROGRESS NOTE ADULT - ASSESSMENT
53 year old male with PMH of HTN, Type A Aortic dissection s/p Dacron grafts, AV resuspension (2013), CAD s/p CABG x 1 SVG to RCA (2013, Dr. Medina), seizure disorder, with recent admit (Mar/Apr) for replacement of transverse aortic arch, second stage TEVAR and aoto-axillary bypass, AV replacement, and CABG x1. Post op course c/b shock, TREY requiring CVVHD, dialysis. Readmitted on 4/27 ([facility] ED) for syncope at home where imaging revealed graft endoleak and pancreatitis. Patient taken back to OR 5/5 for TEVAR, with post op course now complicated by Klebseilla bacteremia (5/15), respiratory failure requiring intubation (5/18), and bronch and PEA arrest (5/19), with respiratory cultures revealing Enterobacter, E. Faecalis and Strep angionosus. Surgery reconsulted re: active hemorrhagic pancreatitis on CT (5/13), with subsequent IR aspiration of peripancreatic fluid collection (15cc, Kleb) and paracentesis - 4L (5/22). Patient started on CVVHD 5/22. Transferred to SICU (5/25), with catheter exchange and two additional drains placed on 5/26 in IR and exlap, wash out and new drain placement on 5/31. The patient returned to the OR 6/1 for evacuation of blood and placement of J tube. Patient failed extubation on 6/3 and received trach on 6/5. Noted to be febrile overnight 6/7, 6/8, 6/9. CT Ch/Ab/Pelvis overnight 6/7 with stable abdominal collections and atelectasis of LLL.     Continue aquaphoresis  F/u BCx  CPAP as tolerated  Rest of care per SICU

## 2023-06-11 NOTE — PROGRESS NOTE ADULT - ASSESSMENT
52 Y with severe cardiogenic shock and oliguric TREY requiring CVVHD initiation 5/22, initially oliguric, now non-oliguric since 6/5 required CVVHD, with good clearance started on AQ  (6/9-6/10) with stable renal function trail of lasix with good response     #non-oliguric ATN 2/2 cardiogenic shock- resolved     Recommend:  Daily BMP   C/w standing lasix 40mg Daily and asses volume status with goal net neg 2-2.5L   Maintain MAP > 70 for adequate renal perfusion  Strict i's and o's

## 2023-06-11 NOTE — PROGRESS NOTE ADULT - ATTENDING COMMENTS
Patient seen and examined with house-staff during bedside rounds.  Resident note read, including vitals, physical findings, laboratory data, and radiological reports.   Revisions included below.  Direct personal management at bed side and extensive interpretation of the data.  Plan was outlined and discussed in details with the housestaff.  Decision making of high complexity  Action taken for acute disease activity to reflect the level of care provided:  - medication reconciliation  - review laboratory data  The patient is clinically stable.  Continue mechanical ventilation.  Patient required increased sedation to synchronized with the ventilator.  Hemoglobin is stable.  Continue antibiotic.  He spiked a fever.  Follow on cultures.  The renal function is stable I discussed with surgery.  The wound is good and there is no evidence of wound infection..  The patient on the recent CT scan of the abdomen

## 2023-06-11 NOTE — PROGRESS NOTE ADULT - SUBJECTIVE AND OBJECTIVE BOX
INTERVAL/OVERNIGHT EVENTS:  Overnight the patient was febrile (Tmax 100.8F), tachycardic (HRmax 141), and tachypneic (RRmax 28) for which he received Tylenol and responded appropriately.    SUBJECTIVE:         Neurologic Medications  acetaminophen   Oral Liquid .. 975 milliGRAM(s) Enteral Tube every 6 hours  dexMEDEtomidine Infusion 1 MICROgram(s)/kG/Hr IV Continuous <Continuous>  fentaNYL   Infusion... 0.3 MICROgram(s)/kG/Hr IV Continuous <Continuous>  HYDROmorphone  Injectable 3 milliGRAM(s) IV Push every 4 hours  ketamine Infusion 0.2 mG/kG/Hr IV Continuous <Continuous>  lacosamide IVPB 250 milliGRAM(s) IV Intermittent every 12 hours    Respiratory Medications  acetylcysteine 20%  Inhalation 4 milliLiter(s) Inhalation every 6 hours  albuterol/ipratropium for Nebulization 3 milliLiter(s) Nebulizer every 6 hours  sodium chloride 3%  Inhalation 4 milliLiter(s) Inhalation every 6 hours    Cardiovascular Medications    Gastrointestinal Medications  dextrose 5%. 1000 milliLiter(s) IV Continuous <Continuous>  pantoprazole  Injectable 40 milliGRAM(s) IV Push daily  Parenteral Nutrition - Adult 1 Each TPN Continuous <Continuous>  sodium chloride 0.9% lock flush 10 milliLiter(s) IV Push every 1 hour PRN Pre/post blood products, medications, blood draw, and to maintain line patency    Genitourinary Medications    Hematologic/Oncologic Medications  aspirin  chewable 81 milliGRAM(s) Enteral Tube every 24 hours  heparin   Injectable 5000 Unit(s) SubCutaneous every 8 hours    Antimicrobial/Immunologic Medications  caspofungin IVPB 50 milliGRAM(s) IV Intermittent every 24 hours  meropenem  IVPB 1000 milliGRAM(s) IV Intermittent every 8 hours    Endocrine/Metabolic Medications  dextrose 50% Injectable 25 Gram(s) IV Push once  dextrose Oral Gel 15 Gram(s) Oral once PRN Blood Glucose LESS THAN 70 milliGRAM(s)/deciliter  glucagon  Injectable 1 milliGRAM(s) IntraMuscular once  insulin lispro (ADMELOG) corrective regimen sliding scale   SubCutaneous every 6 hours    Topical/Other Medications  artificial  tears Solution 1 Drop(s) Both EYES two times a day  chlorhexidine 0.12% Liquid 15 milliLiter(s) Oral Mucosa every 12 hours  chlorhexidine 2% Cloths 1 Application(s) Topical daily      MEDICATIONS  (PRN):  dextrose Oral Gel 15 Gram(s) Oral once PRN Blood Glucose LESS THAN 70 milliGRAM(s)/deciliter  sodium chloride 0.9% lock flush 10 milliLiter(s) IV Push every 1 hour PRN Pre/post blood products, medications, blood draw, and to maintain line patency      I&O's Detail    10 Vazquez 2023 07:01  -  11 Jun 2023 07:00  --------------------------------------------------------  IN:    Dexmedetomidine: 399.4 mL    Enteral Tube Flush: 100 mL    FentaNYL: 4.8 mL    FentaNYL: 29 mL    IV PiggyBack: 249.9 mL    IV PiggyBack: 250 mL    Jevity 1.2: 470 mL    Ketamine: 84 mL    Ketamine: 54.6 mL    Norepinephrine: 273.5 mL    TPN (Total Parenteral Nutrition): 1361 mL  Total IN: 3276.1 mL    OUT:    Bulb (mL): 220 mL    Bulb (mL): 30 mL    Bulb (mL): 30 mL    Drain (mL): 85 mL    Drain (mL): 40 mL    Incontinent per Condom Catheter (mL): 1430 mL    Other (mL): 4075 mL    Voided (mL): 210 mL  Total OUT: 6120 mL    Total NET: -2843.8 mL          Vital Signs Last 24 Hrs  T(C): 37.7 (11 Jun 2023 05:27), Max: 38.2 (10 Vazquez 2023 22:22)  T(F): 99.8 (11 Jun 2023 05:27), Max: 100.8 (10 Vazquez 2023 22:22)  HR: 85 (11 Jun 2023 07:00) (73 - 145)  BP: --  BP(mean): --  RR: 16 (11 Jun 2023 07:00) (10 - 35)  SpO2: 100% (11 Jun 2023 07:00) (93% - 100%)    Parameters below as of 11 Jun 2023 07:00  Patient On (Oxygen Delivery Method): ventilator    O2 Concentration (%): 40      GENERAL: NAD, resting comfortably in bed  HEENT: NCAT, MMM, torticollis favoring the L side, RIJ  C/V: Mild normal sinus tachycardia (-111), normal peripheral perfusion  PULM: Nonlabored breathing on ventilator, no respiratory distress, Mode: AC/ CMV (Assist Control/ Continuous Mandatory Ventilation), RR (machine): 16, TV (machine): 500, FiO2: 40, PEEP: 8, ITime: 1, MAP: 8.3, PIP: 20  ABD: Soft, mild distension, no rebound tenderness, no guarding, midline incision with island dressing with serosanguinous output, 3 CODIE drain (serosanguinous output), 2 IR drains (serosanguinous CODIE, bilious to gravity), J-tube with TF running  :  Condom catheter in place  EXTREM: WWP, BLE 3+ pitting edema, SCDs in place  NEURO: RASS 0 to -1    LABS:                        7.6    8.76  )-----------( 207      ( 11 Jun 2023 05:12 )             23.9     06-11    138  |  102  |  46<H>  ----------------------------<  115<H>  5.0   |  28  |  0.78    Ca    8.3<L>      11 Jun 2023 05:12  Phos  4.8     06-11  Mg     2.2     06-11    TPro  5.9<L>  /  Alb  2.2<L>  /  TBili  0.7  /  DBili  x   /  AST  14  /  ALT  12  /  AlkPhos  189<H>  06-11          RADIOLOGY & ADDITIONAL STUDIES:      Culture - Acid Fast - Bronchial w/Smear (collected 06-09-23 @ 18:54)  Source: .Bronchial None    Culture - Bronchial (collected 06-09-23 @ 18:54)  Source: Bronch Wash LLL bronchial wash  Gram Stain (06-09-23 @ 19:29):    Moderate epithelial cells    Moderate WBC's    Rare Gram Negative Rods    Culture - Blood (collected 06-09-23 @ 13:58)  Source: .Blood Blood-Peripheral  Preliminary Report (06-10-23 @ 15:00):    No growth at 1 day.    Culture - Blood (collected 06-09-23 @ 13:58)  Source: .Blood Blood-Peripheral  Preliminary Report (06-10-23 @ 15:00):    No growth at 1 day.    Culture - Sputum (collected 06-09-23 @ 13:32)  Source: .Sputum Sputum  Gram Stain (06-09-23 @ 17:44):    Moderate epithelial cells    Moderate WBC's    Few Gram Negative Rods    Rare Yeast  Final Report (06-09-23 @ 17:44):    Sputum specimen rejected.  Microscopic examination indicates    oropharyngeal contamination.  Please repeat.    Culture - Blood (collected 06-09-23 @ 02:36)  Source: .Blood Blood-Peripheral  Preliminary Report (06-11-23 @ 06:00):    No growth at 2 days.    Urinalysis with Rflx Culture (collected 06-08-23 @ 06:30)    Culture - Sputum (collected 06-07-23 @ 18:12)  Source: .Sputum Sputum  Gram Stain (06-07-23 @ 22:35):    Numerous epithelial cells    Moderate WBC's    Rare Yeast  Final Report (06-07-23 @ 22:35):    Sputum specimen rejected.  Microscopic examination indicates    oropharyngeal contamination.  Please repeat.    Culture - Blood (collected 06-06-23 @ 21:00)  Source: .Blood Blood-Peripheral  Preliminary Report (06-10-23 @ 22:00):    No growth at 4 days.    Culture - Blood (collected 06-06-23 @ 21:00)  Source: .Blood Blood-Peripheral  Preliminary Report (06-10-23 @ 22:00):    No growth at 4 days.     INTERVAL/OVERNIGHT EVENTS:  Overnight the patient was febrile (Tmax 100.8F), tachycardic (HRmax 141), and tachypneic (RRmax 28) for which he received Tylenol and responded appropriately.    SUBJECTIVE: Patient is responsive to questions and blinks to answer as well as tracks well and follows commands.  He endorses pain.      Neurologic Medications  acetaminophen   Oral Liquid .. 975 milliGRAM(s) Enteral Tube every 6 hours  dexMEDEtomidine Infusion 1 MICROgram(s)/kG/Hr IV Continuous <Continuous>  fentaNYL   Infusion... 0.3 MICROgram(s)/kG/Hr IV Continuous <Continuous>  HYDROmorphone  Injectable 3 milliGRAM(s) IV Push every 4 hours  ketamine Infusion 0.2 mG/kG/Hr IV Continuous <Continuous>  lacosamide IVPB 250 milliGRAM(s) IV Intermittent every 12 hours    Respiratory Medications  acetylcysteine 20%  Inhalation 4 milliLiter(s) Inhalation every 6 hours  albuterol/ipratropium for Nebulization 3 milliLiter(s) Nebulizer every 6 hours  sodium chloride 3%  Inhalation 4 milliLiter(s) Inhalation every 6 hours    Cardiovascular Medications    Gastrointestinal Medications  dextrose 5%. 1000 milliLiter(s) IV Continuous <Continuous>  pantoprazole  Injectable 40 milliGRAM(s) IV Push daily  Parenteral Nutrition - Adult 1 Each TPN Continuous <Continuous>  sodium chloride 0.9% lock flush 10 milliLiter(s) IV Push every 1 hour PRN Pre/post blood products, medications, blood draw, and to maintain line patency    Genitourinary Medications    Hematologic/Oncologic Medications  aspirin  chewable 81 milliGRAM(s) Enteral Tube every 24 hours  heparin   Injectable 5000 Unit(s) SubCutaneous every 8 hours    Antimicrobial/Immunologic Medications  caspofungin IVPB 50 milliGRAM(s) IV Intermittent every 24 hours  meropenem  IVPB 1000 milliGRAM(s) IV Intermittent every 8 hours    Endocrine/Metabolic Medications  dextrose 50% Injectable 25 Gram(s) IV Push once  dextrose Oral Gel 15 Gram(s) Oral once PRN Blood Glucose LESS THAN 70 milliGRAM(s)/deciliter  glucagon  Injectable 1 milliGRAM(s) IntraMuscular once  insulin lispro (ADMELOG) corrective regimen sliding scale   SubCutaneous every 6 hours    Topical/Other Medications  artificial  tears Solution 1 Drop(s) Both EYES two times a day  chlorhexidine 0.12% Liquid 15 milliLiter(s) Oral Mucosa every 12 hours  chlorhexidine 2% Cloths 1 Application(s) Topical daily      MEDICATIONS  (PRN):  dextrose Oral Gel 15 Gram(s) Oral once PRN Blood Glucose LESS THAN 70 milliGRAM(s)/deciliter  sodium chloride 0.9% lock flush 10 milliLiter(s) IV Push every 1 hour PRN Pre/post blood products, medications, blood draw, and to maintain line patency      I&O's Detail    10 Vazquez 2023 07:01  -  11 Jun 2023 07:00  --------------------------------------------------------  IN:    Dexmedetomidine: 399.4 mL    Enteral Tube Flush: 100 mL    FentaNYL: 4.8 mL    FentaNYL: 29 mL    IV PiggyBack: 249.9 mL    IV PiggyBack: 250 mL    Jevity 1.2: 470 mL    Ketamine: 84 mL    Ketamine: 54.6 mL    Norepinephrine: 273.5 mL    TPN (Total Parenteral Nutrition): 1361 mL  Total IN: 3276.1 mL    OUT:    Bulb (mL): 220 mL    Bulb (mL): 30 mL    Bulb (mL): 30 mL    Drain (mL): 85 mL    Drain (mL): 40 mL    Incontinent per Condom Catheter (mL): 1430 mL    Other (mL): 4075 mL    Voided (mL): 210 mL  Total OUT: 6120 mL    Total NET: -2843.8 mL          Vital Signs Last 24 Hrs  T(C): 37.7 (11 Jun 2023 05:27), Max: 38.2 (10 Vazquez 2023 22:22)  T(F): 99.8 (11 Jun 2023 05:27), Max: 100.8 (10 Vazquez 2023 22:22)  HR: 85 (11 Jun 2023 07:00) (73 - 145)  BP: --  BP(mean): --  RR: 16 (11 Jun 2023 07:00) (10 - 35)  SpO2: 100% (11 Jun 2023 07:00) (93% - 100%)    Parameters below as of 11 Jun 2023 07:00  Patient On (Oxygen Delivery Method): ventilator    O2 Concentration (%): 40      GENERAL: NAD, resting comfortably in bed  HEENT: NCAT, MMM, torticollis favoring the L side, RIJ, tracheostomy with serosanguinous surrounding drainage  CHEST:  L sublcavian HD catheter with aquapheresis running (flow 40, rate 200)  C/V: Normal rate, normal peripheral perfusion, normotensive (/59, MAP 81)  PULM: Nonlabored breathing on ventilator via tracheostomy, no respiratory distress, Mode: AC/ CMV (Assist Control/ Continuous Mandatory Ventilation), RR (machine): 16, TV (machine): 500, FiO2: 40, PEEP: 8, ITime: 1, MAP: 8.3, PIP: 20  ABD: Soft, nondistended, moderate tenderness to palpation with grimace, no rebound tenderness, no guarding, midline incision stapled closed is negative of drainage/fluctuance/surrounding erythema, 3 CODIE drain (serosanguinous output), 2 IR drains (bilious CODIE, serosanguinous to gravity), J-tube with TF running  :  Condom catheter in place, urine clear and concenrated without sediment  EXTREM: R arterial line, WWP, BLE mild pitting edema (improved), SCDs in place  NEURO: RASS 0 to -1    LABS:                        7.6    8.76  )-----------( 207      ( 11 Jun 2023 05:12 )             23.9     06-11    138  |  102  |  46<H>  ----------------------------<  115<H>  5.0   |  28  |  0.78    Ca    8.3<L>      11 Jun 2023 05:12  Phos  4.8     06-11  Mg     2.2     06-11    TPro  5.9<L>  /  Alb  2.2<L>  /  TBili  0.7  /  DBili  x   /  AST  14  /  ALT  12  /  AlkPhos  189<H>  06-11          RADIOLOGY & ADDITIONAL STUDIES:      Culture - Acid Fast - Bronchial w/Smear (collected 06-09-23 @ 18:54)  Source: .Bronchial None    Culture - Bronchial (collected 06-09-23 @ 18:54)  Source: Bronch Wash LLL bronchial wash  Gram Stain (06-09-23 @ 19:29):    Moderate epithelial cells    Moderate WBC's    Rare Gram Negative Rods    Culture - Blood (collected 06-09-23 @ 13:58)  Source: .Blood Blood-Peripheral  Preliminary Report (06-10-23 @ 15:00):    No growth at 1 day.    Culture - Blood (collected 06-09-23 @ 13:58)  Source: .Blood Blood-Peripheral  Preliminary Report (06-10-23 @ 15:00):    No growth at 1 day.    Culture - Sputum (collected 06-09-23 @ 13:32)  Source: .Sputum Sputum  Gram Stain (06-09-23 @ 17:44):    Moderate epithelial cells    Moderate WBC's    Few Gram Negative Rods    Rare Yeast  Final Report (06-09-23 @ 17:44):    Sputum specimen rejected.  Microscopic examination indicates    oropharyngeal contamination.  Please repeat.    Culture - Blood (collected 06-09-23 @ 02:36)  Source: .Blood Blood-Peripheral  Preliminary Report (06-11-23 @ 06:00):    No growth at 2 days.    Urinalysis with Rflx Culture (collected 06-08-23 @ 06:30)    Culture - Sputum (collected 06-07-23 @ 18:12)  Source: .Sputum Sputum  Gram Stain (06-07-23 @ 22:35):    Numerous epithelial cells    Moderate WBC's    Rare Yeast  Final Report (06-07-23 @ 22:35):    Sputum specimen rejected.  Microscopic examination indicates    oropharyngeal contamination.  Please repeat.    Culture - Blood (collected 06-06-23 @ 21:00)  Source: .Blood Blood-Peripheral  Preliminary Report (06-10-23 @ 22:00):    No growth at 4 days.    Culture - Blood (collected 06-06-23 @ 21:00)  Source: .Blood Blood-Peripheral  Preliminary Report (06-10-23 @ 22:00):    No growth at 4 days.

## 2023-06-11 NOTE — PROGRESS NOTE ADULT - ASSESSMENT
53 year old male with PMH of HTN, Type A Aortic dissection s/p Dacron grafts, AV resuspension (2013), CAD s/p CABG x 1 SVG to RCA (2013, Dr. Medina), seizure disorder, with recent admit (Mar/Apr) for replacement of transverse aortic arch, second stage TEVAR and aoto-axillary bypass, AV replacement, and CABG x1. Post op course c/b shock, TREY requiring CVVHD, dialysis. Readmitted on 4/27 ([facility] ED) for syncope at home where imaging revealed graft endoleak and pancreatitis. Patient taken back to OR 5/5 for TEVAR, with post op course now complicated by Klebseilla bacteremia (5/15), respiratory failure requiring intubation (5/18), and bronch and PEA arrest (5/19), with respiratory cultures revealing Enterobacter, E. Faecalis and Strep angionosus. Surgery reconsulted re: active hemorrhagic pancreatitis on CT (5/13), with subsequent IR aspiration of peripancreatic fluid collection (15cc, Kleb) and paracentesis - 4L (5/22). Patient started on CVVHD 5/22. Transferred to SICU (5/25), with catheter exchange and two additional drains placed on 5/26 in IR and exlap, wash out and new drain placement on 5/31. The patient returned to the OR 6/1 for evacuation of blood and placement of J tube. Patient failed extubation on 6/3 and received trach on 6/5. Noted to be febrile overnight 6/7, 6/8, 6/9. CT Ch/Ab/Pelvis overnight 6/7 with stable abdominal collections and atelectasis of LLL.       PLAN  NEURO:  S/p trach: wean sedation/Precedex gtt for RASS -1/0 as tolerated. Wean Fentanyl gtt (1 mcg/kg/hr) as tolerated and keep Ketamine gtt at 0.15 mcg/kg/hr. Hydromorphone 3mg IVP q 4 hours standing for additional pain management. Continue 250mg Lacosamide IV. Neuro checks per protocol and prn. Continue EEG with transition, discontinuation of Depakote.   HEENT: Artificial tears as needed.  CV: Maintain MAP > 65, titrate Norepinephrine gtt as tolerated. Continue ASA  PULM: Continue CPAP 5/5 40% as tolerated. AC 40/500/12/5 as needed. Pulmonary toileting. Continue Duonebs, Mucomyst, and 3% Saline neb q 6 hours.   GI/FEN: Continue TPN @ 70cc/hr. Increase TF to 50cc/hr (Jevity) as tolerated and per surgery recs. Monitor drain output. Continue Protonix 40mg BID.  : TREY. Aquaphoresis (06/08/2023--), goal net negative. Continue to monitor U/O.  HEME: Subq Heparin. Platelets stable. Follow up DVT/Doppler studies. Maintain current T&S. Hold ASA (H/H, tachycardia, 05/05/2023-05/17/2023).  ENDO: Sliding scale per TPN protocol.  ID: Continue Caspofungin (06/09/2023--) and Meropenem (06/09/2023--).  Discontinue 2g Ceftriaxone QD (06/05/2023-06/08/2023) for Klebsiella bacteremia. No growth in cultures.   PPX: SCDs. SQH.  LINES: R radial (6/7), Left subclavian HD Cath (5/31), RIJ TLC (6/1) LIJ TLC (5/23)  WOUNDS/DRAINS: Right OR CODIE, Left OR CODIE, left IR hematoma drain, left IR pancreatic drain. Continue to monitor output and flush IR/panc drain q shift 250cc.   PT: OOB to chair.  Dispo: SICU   53 year old male with PMH of HTN, Type A Aortic dissection s/p Dacron grafts, AV resuspension (2013), CAD s/p CABG x 1 SVG to RCA (2013, Dr. Medina), seizure disorder, with recent admit (Mar/Apr) for replacement of transverse aortic arch, second stage TEVAR and aoto-axillary bypass, AV replacement, and CABG x1. Post op course c/b shock, TREY requiring CVVHD, dialysis. Readmitted on 4/27 ([facility] ED) for syncope at home where imaging revealed graft endoleak and pancreatitis. Patient taken back to OR 5/5 for TEVAR, with post op course now complicated by Klebseilla bacteremia (5/15), respiratory failure requiring intubation (5/18), and bronch and PEA arrest (5/19), with respiratory cultures revealing Enterobacter, E. Faecalis and Strep angionosus. Surgery reconsulted re: active hemorrhagic pancreatitis on CT (5/13), with subsequent IR aspiration of peripancreatic fluid collection (15cc, Kleb) and paracentesis - 4L (5/22). Patient started on CVVHD 5/22. Transferred to SICU (5/25), with catheter exchange and two additional drains placed on 5/26 in IR and exlap, wash out and new drain placement on 5/31. The patient returned to the OR 6/1 for evacuation of blood and placement of J tube. Patient failed extubation on 6/3 and received trach on 6/5. Noted to be febrile overnight 6/7, 6/8, 6/9. CT Ch/Ab/Pelvis overnight 6/7 with stable abdominal collections and atelectasis of LLL.       PLAN  NEURO:  S/p trach: wean sedation/Precedex gtt for RASS -1/0 as tolerated. Wean Fentanyl gtt (1 mcg/kg/hr) as tolerated and keep Ketamine gtt at 0.15 mcg/kg/hr. Hydromorphone 3mg IVP q 4 hours standing for additional pain management. Continue 250mg Lacosamide IV. Neuro checks per protocol and prn. Continue EEG with transition, discontinuation of Depakote.   HEENT: Artificial tears as needed.  CV: Maintain MAP > 65, titrate Norepinephrine gtt as tolerated. Continue ASA  PULM: Continue CPAP 5/5 40% as tolerated. AC 40/500/12/5 as needed. Pulmonary toileting. Continue Duonebs, Mucomyst, and 3% Saline neb q 6 hours.   GI/FEN: Continue TPN @ 70cc/hr. Increase TF to 50cc/hr (Jevity) as tolerated and per surgery recs. Monitor drain output. Continue Protonix 40mg BID.  : TREY resolving. Goal net negative with Lasix 40mg BID and titrated PRN. Discontinue Aquaphoresis (06/08/2023-06/11/2023). Continue to monitor U/O.  HEME: Subq Heparin. Platelets stable. Follow up DVT/Doppler studies. Maintain current T&S. Hold ASA (H/H, tachycardia, 05/05/2023-05/17/2023).  ENDO: Sliding scale per TPN protocol.  ID: Continue Caspofungin (06/09/2023--) and Meropenem (06/09/2023--).  Discontinue 2g Ceftriaxone QD (06/05/2023-06/08/2023) for Klebsiella bacteremia. No growth in cultures.   PPX: SCDs. SQH.  LINES: R radial (6/7), Left subclavian HD Cath (5/31), RIJ TLC (6/1) LIJ TLC (5/23)  WOUNDS/DRAINS: Right OR CODIE, Left OR CODIE, left IR hematoma drain, left IR pancreatic drain. Continue to monitor output and flush IR/panc drain q shift 250cc.   PT: OOB to chair.  Dispo: SICU

## 2023-06-11 NOTE — PROGRESS NOTE ADULT - SUBJECTIVE AND OBJECTIVE BOX
General Surgery Progress Note    SUBJECTIVE:   Patient seen and examined at bedside by nati during AM rounds.    Vital Signs Last 24 Hrs  T(C): 37.6 (11 Jun 2023 00:00), Max: 38.2 (10 Vazquez 2023 22:22)  T(F): 99.6 (11 Jun 2023 00:00), Max: 100.8 (10 Vazquez 2023 22:22)  HR: 105 (11 Jun 2023 06:00) (87 - 145)  BP: --  BP(mean): --  RR: 16 (11 Jun 2023 06:00) (10 - 35)  SpO2: 99% (11 Jun 2023 06:00) (93% - 100%)    Parameters below as of 11 Jun 2023 05:00  Patient On (Oxygen Delivery Method): ventilator    O2 Concentration (%): 40    I&O's Summary    09 Jun 2023 07:01  -  10 Vazquez 2023 07:00  --------------------------------------------------------  IN: 4334.3 mL / OUT: 5434 mL / NET: -1099.7 mL    10 Vazquez 2023 07:01  -  11 Jun 2023 06:30  --------------------------------------------------------  IN: 3276.1 mL / OUT: 6120 mL / NET: -2843.8 mL        Physical Exam:  General: Resting comfortably in bed, NAD  HEENT: ATNC  Pulmonary: Nonlabored breathing, no respiratory distress, no acessory muscle use noted  Cardiovascular: NSR  Abdomen: Soft, nondisteded, appropriate incisional tenderness  Extremities: WWP, SCDs in place, No significant edema appreciated    LABS:                        7.6    8.76  )-----------( 207      ( 11 Jun 2023 05:12 )             23.9     06-11    138  |  102  |  46<H>  ----------------------------<  115<H>  5.0   |  28  |  0.78    Ca    8.3<L>      11 Jun 2023 05:12  Phos  4.8     06-11  Mg     2.2     06-11    TPro  5.9<L>  /  Alb  2.2<L>  /  TBili  0.7  /  DBili  x   /  AST  14  /  ALT  12  /  AlkPhos  189<H>  06-11          Plan:  -NPO/IVF - LR @ 100  -Antibiotics -  -SQH and SCDs  -OOB as tolerated/IS  -AM labs General Surgery Progress Note    SUBJECTIVE:   Patient seen and examined at bedside by chief during AM rounds. Patient noted to be febrile overnight that resolved with tylenol. Tachycardia noted to resolve this AM. Patient resting comfortably in bed. Minimally interactive to subjective interview.     Vital Signs Last 24 Hrs  T(C): 37.6 (11 Jun 2023 00:00), Max: 38.2 (10 Vazquez 2023 22:22)  T(F): 99.6 (11 Jun 2023 00:00), Max: 100.8 (10 Vazquez 2023 22:22)  HR: 105 (11 Jun 2023 06:00) (87 - 145)  BP: --  BP(mean): --  RR: 16 (11 Jun 2023 06:00) (10 - 35)  SpO2: 99% (11 Jun 2023 06:00) (93% - 100%)    Parameters below as of 11 Jun 2023 05:00  Patient On (Oxygen Delivery Method): ventilator    O2 Concentration (%): 40    I&O's Summary    09 Jun 2023 07:01  -  10 Vazquez 2023 07:00  --------------------------------------------------------  IN: 4334.3 mL / OUT: 5434 mL / NET: -1099.7 mL    10 Vazquez 2023 07:01  -  11 Jun 2023 06:30  --------------------------------------------------------  IN: 3276.1 mL / OUT: 6120 mL / NET: -2843.8 mL        Physical Exam:  General: Resting in bed, NAD  HEENT: Trach in place to vent - VC  Pulmonary: Nonlabored breathing, no respiratory distress, no accessory muscle use noted  Cardiovascular: NSR  Abdomen: Soft, nondistended, patient not wincing to abdominal palpation. CODIE x 5 noted - 4 noted to be SS and L drain noted to be bilious. Midline incision well healing  Extremities: WWP, SCDs in place, B/L pedal edema appreciated     LABS:                        7.6    8.76  )-----------( 207      ( 11 Jun 2023 05:12 )             23.9     06-11    138  |  102  |  46<H>  ----------------------------<  115<H>  5.0   |  28  |  0.78    Ca    8.3<L>      11 Jun 2023 05:12  Phos  4.8     06-11  Mg     2.2     06-11    TPro  5.9<L>  /  Alb  2.2<L>  /  TBili  0.7  /  DBili  x   /  AST  14  /  ALT  12  /  AlkPhos  189<H>  06-11

## 2023-06-11 NOTE — PROGRESS NOTE ADULT - SUBJECTIVE AND OBJECTIVE BOX
1. 16 Fr teal LUQ peripancreatic abscess "Left Pancreatic CODIE Drain" placed on 5/24 (attached to gravity bag):   -95 cc serosanguinous output in 24 hrs. 15 cc in previous 24 hrs. Flushed per nursing.     2. 16 Fr teal LUQ hematoma "Left Abdominal Hematoma CODIE Drain" placed on 5/25 and upsized on 5/26:   -50 cc dark green-brown output in 24 hrs. 100 cc in previous 24 hrs. Flushed easily with 3cc sterile NS.

## 2023-06-12 LAB
-  AMPICILLIN/SULBACTAM: SIGNIFICANT CHANGE UP
-  AMPICILLIN: SIGNIFICANT CHANGE UP
-  CEFAZOLIN: SIGNIFICANT CHANGE UP
-  CEFTRIAXONE: SIGNIFICANT CHANGE UP
-  CIPROFLOXACIN: SIGNIFICANT CHANGE UP
-  ERTAPENEM: SIGNIFICANT CHANGE UP
-  GENTAMICIN: SIGNIFICANT CHANGE UP
-  PIPERACILLIN/TAZOBACTAM: SIGNIFICANT CHANGE UP
-  TOBRAMYCIN: SIGNIFICANT CHANGE UP
-  TRIMETHOPRIM/SULFAMETHOXAZOLE: SIGNIFICANT CHANGE UP
ALBUMIN SERPL ELPH-MCNC: 2.3 G/DL — LOW (ref 3.3–5)
ALP SERPL-CCNC: 208 U/L — HIGH (ref 40–120)
ALT FLD-CCNC: 13 U/L — SIGNIFICANT CHANGE UP (ref 10–45)
ANION GAP SERPL CALC-SCNC: 10 MMOL/L — SIGNIFICANT CHANGE UP (ref 5–17)
ANION GAP SERPL CALC-SCNC: 8 MMOL/L — SIGNIFICANT CHANGE UP (ref 5–17)
AST SERPL-CCNC: 16 U/L — SIGNIFICANT CHANGE UP (ref 10–40)
BASE EXCESS BLDA CALC-SCNC: 4.9 MMOL/L — HIGH (ref -2–3)
BILIRUB SERPL-MCNC: 0.7 MG/DL — SIGNIFICANT CHANGE UP (ref 0.2–1.2)
BLD GP AB SCN SERPL QL: NEGATIVE — SIGNIFICANT CHANGE UP
BUN SERPL-MCNC: 43 MG/DL — HIGH (ref 7–23)
BUN SERPL-MCNC: 43 MG/DL — HIGH (ref 7–23)
CALCIUM SERPL-MCNC: 8.2 MG/DL — LOW (ref 8.4–10.5)
CALCIUM SERPL-MCNC: 8.6 MG/DL — SIGNIFICANT CHANGE UP (ref 8.4–10.5)
CHLORIDE SERPL-SCNC: 104 MMOL/L — SIGNIFICANT CHANGE UP (ref 96–108)
CHLORIDE SERPL-SCNC: 105 MMOL/L — SIGNIFICANT CHANGE UP (ref 96–108)
CO2 BLDA-SCNC: 30 MMOL/L — HIGH (ref 19–24)
CO2 SERPL-SCNC: 27 MMOL/L — SIGNIFICANT CHANGE UP (ref 22–31)
CO2 SERPL-SCNC: 28 MMOL/L — SIGNIFICANT CHANGE UP (ref 22–31)
CREAT SERPL-MCNC: 0.79 MG/DL — SIGNIFICANT CHANGE UP (ref 0.5–1.3)
CREAT SERPL-MCNC: 0.81 MG/DL — SIGNIFICANT CHANGE UP (ref 0.5–1.3)
EGFR: 105 ML/MIN/1.73M2 — SIGNIFICANT CHANGE UP
EGFR: 106 ML/MIN/1.73M2 — SIGNIFICANT CHANGE UP
GLUCOSE BLDC GLUCOMTR-MCNC: 123 MG/DL — HIGH (ref 70–99)
GLUCOSE BLDC GLUCOMTR-MCNC: 141 MG/DL — HIGH (ref 70–99)
GLUCOSE BLDC GLUCOMTR-MCNC: 82 MG/DL — SIGNIFICANT CHANGE UP (ref 70–99)
GLUCOSE SERPL-MCNC: 110 MG/DL — HIGH (ref 70–99)
GLUCOSE SERPL-MCNC: 129 MG/DL — HIGH (ref 70–99)
HCO3 BLDA-SCNC: 29 MMOL/L — HIGH (ref 21–28)
HCT VFR BLD CALC: 23.8 % — LOW (ref 39–50)
HGB BLD-MCNC: 7.6 G/DL — LOW (ref 13–17)
MAGNESIUM SERPL-MCNC: 2.1 MG/DL — SIGNIFICANT CHANGE UP (ref 1.6–2.6)
MCHC RBC-ENTMCNC: 29.6 PG — SIGNIFICANT CHANGE UP (ref 27–34)
MCHC RBC-ENTMCNC: 31.9 GM/DL — LOW (ref 32–36)
MCV RBC AUTO: 92.6 FL — SIGNIFICANT CHANGE UP (ref 80–100)
METHOD TYPE: SIGNIFICANT CHANGE UP
NRBC # BLD: 0 /100 WBCS — SIGNIFICANT CHANGE UP (ref 0–0)
PCO2 BLDA: 41 MMHG — SIGNIFICANT CHANGE UP (ref 35–48)
PH BLDA: 7.46 — HIGH (ref 7.35–7.45)
PHOSPHATE SERPL-MCNC: 4.7 MG/DL — HIGH (ref 2.5–4.5)
PLATELET # BLD AUTO: 297 K/UL — SIGNIFICANT CHANGE UP (ref 150–400)
PO2 BLDA: 91 MMHG — SIGNIFICANT CHANGE UP (ref 83–108)
POTASSIUM SERPL-MCNC: 4 MMOL/L — SIGNIFICANT CHANGE UP (ref 3.5–5.3)
POTASSIUM SERPL-MCNC: 4.6 MMOL/L — SIGNIFICANT CHANGE UP (ref 3.5–5.3)
POTASSIUM SERPL-SCNC: 4 MMOL/L — SIGNIFICANT CHANGE UP (ref 3.5–5.3)
POTASSIUM SERPL-SCNC: 4.6 MMOL/L — SIGNIFICANT CHANGE UP (ref 3.5–5.3)
PROT SERPL-MCNC: 6 G/DL — SIGNIFICANT CHANGE UP (ref 6–8.3)
RBC # BLD: 2.57 M/UL — LOW (ref 4.2–5.8)
RBC # FLD: 16.2 % — HIGH (ref 10.3–14.5)
RH IG SCN BLD-IMP: POSITIVE — SIGNIFICANT CHANGE UP
SAO2 % BLDA: 98.6 % — HIGH (ref 94–98)
SODIUM SERPL-SCNC: 141 MMOL/L — SIGNIFICANT CHANGE UP (ref 135–145)
SODIUM SERPL-SCNC: 141 MMOL/L — SIGNIFICANT CHANGE UP (ref 135–145)
WBC # BLD: 9.05 K/UL — SIGNIFICANT CHANGE UP (ref 3.8–10.5)
WBC # FLD AUTO: 9.05 K/UL — SIGNIFICANT CHANGE UP (ref 3.8–10.5)

## 2023-06-12 PROCEDURE — 99233 SBSQ HOSP IP/OBS HIGH 50: CPT | Mod: GC

## 2023-06-12 PROCEDURE — 71045 X-RAY EXAM CHEST 1 VIEW: CPT | Mod: 26

## 2023-06-12 PROCEDURE — 71045 X-RAY EXAM CHEST 1 VIEW: CPT | Mod: 26,77

## 2023-06-12 PROCEDURE — 99233 SBSQ HOSP IP/OBS HIGH 50: CPT

## 2023-06-12 RX ORDER — FENTANYL CITRATE 50 UG/ML
0.2 INJECTION INTRAVENOUS
Qty: 5000 | Refills: 0 | Status: DISCONTINUED | OUTPATIENT
Start: 2023-06-12 | End: 2023-06-13

## 2023-06-12 RX ORDER — ELECTROLYTE SOLUTION,INJ
1 VIAL (ML) INTRAVENOUS
Refills: 0 | Status: DISCONTINUED | OUTPATIENT
Start: 2023-06-12 | End: 2023-06-12

## 2023-06-12 RX ORDER — FUROSEMIDE 40 MG
60 TABLET ORAL ONCE
Refills: 0 | Status: COMPLETED | OUTPATIENT
Start: 2023-06-12 | End: 2023-06-12

## 2023-06-12 RX ORDER — METOPROLOL TARTRATE 50 MG
2.5 TABLET ORAL EVERY 6 HOURS
Refills: 0 | Status: DISCONTINUED | OUTPATIENT
Start: 2023-06-12 | End: 2023-06-13

## 2023-06-12 RX ORDER — HYDROMORPHONE HYDROCHLORIDE 2 MG/ML
1 INJECTION INTRAMUSCULAR; INTRAVENOUS; SUBCUTANEOUS ONCE
Refills: 0 | Status: DISCONTINUED | OUTPATIENT
Start: 2023-06-12 | End: 2023-06-13

## 2023-06-12 RX ORDER — FUROSEMIDE 40 MG
40 TABLET ORAL ONCE
Refills: 0 | Status: COMPLETED | OUTPATIENT
Start: 2023-06-12 | End: 2023-06-12

## 2023-06-12 RX ORDER — HYDROMORPHONE HYDROCHLORIDE 2 MG/ML
1 INJECTION INTRAMUSCULAR; INTRAVENOUS; SUBCUTANEOUS EVERY 4 HOURS
Refills: 0 | Status: DISCONTINUED | OUTPATIENT
Start: 2023-06-12 | End: 2023-06-16

## 2023-06-12 RX ORDER — HYDROMORPHONE HYDROCHLORIDE 2 MG/ML
2 INJECTION INTRAMUSCULAR; INTRAVENOUS; SUBCUTANEOUS ONCE
Refills: 0 | Status: DISCONTINUED | OUTPATIENT
Start: 2023-06-12 | End: 2023-06-12

## 2023-06-12 RX ORDER — FUROSEMIDE 40 MG
80 TABLET ORAL ONCE
Refills: 0 | Status: COMPLETED | OUTPATIENT
Start: 2023-06-12 | End: 2023-06-12

## 2023-06-12 RX ADMIN — HYDROMORPHONE HYDROCHLORIDE 3 MILLIGRAM(S): 2 INJECTION INTRAMUSCULAR; INTRAVENOUS; SUBCUTANEOUS at 11:01

## 2023-06-12 RX ADMIN — Medication 80 MILLIGRAM(S): at 17:32

## 2023-06-12 RX ADMIN — HYDROMORPHONE HYDROCHLORIDE 3 MILLIGRAM(S): 2 INJECTION INTRAMUSCULAR; INTRAVENOUS; SUBCUTANEOUS at 14:00

## 2023-06-12 RX ADMIN — HYDROMORPHONE HYDROCHLORIDE 2 MILLIGRAM(S): 2 INJECTION INTRAMUSCULAR; INTRAVENOUS; SUBCUTANEOUS at 18:36

## 2023-06-12 RX ADMIN — HYDROMORPHONE HYDROCHLORIDE 3 MILLIGRAM(S): 2 INJECTION INTRAMUSCULAR; INTRAVENOUS; SUBCUTANEOUS at 17:45

## 2023-06-12 RX ADMIN — Medication 3 MILLILITER(S): at 06:05

## 2023-06-12 RX ADMIN — HEPARIN SODIUM 5000 UNIT(S): 5000 INJECTION INTRAVENOUS; SUBCUTANEOUS at 05:14

## 2023-06-12 RX ADMIN — HYDROMORPHONE HYDROCHLORIDE 3 MILLIGRAM(S): 2 INJECTION INTRAMUSCULAR; INTRAVENOUS; SUBCUTANEOUS at 21:55

## 2023-06-12 RX ADMIN — Medication 975 MILLIGRAM(S): at 17:33

## 2023-06-12 RX ADMIN — Medication 975 MILLIGRAM(S): at 21:55

## 2023-06-12 RX ADMIN — LACOSAMIDE 150 MILLIGRAM(S): 50 TABLET ORAL at 17:31

## 2023-06-12 RX ADMIN — Medication 3 MILLILITER(S): at 09:17

## 2023-06-12 RX ADMIN — CHLORHEXIDINE GLUCONATE 15 MILLILITER(S): 213 SOLUTION TOPICAL at 17:35

## 2023-06-12 RX ADMIN — HEPARIN SODIUM 5000 UNIT(S): 5000 INJECTION INTRAVENOUS; SUBCUTANEOUS at 23:00

## 2023-06-12 RX ADMIN — Medication 3 MILLILITER(S): at 17:34

## 2023-06-12 RX ADMIN — SODIUM CHLORIDE 4 MILLILITER(S): 9 INJECTION INTRAMUSCULAR; INTRAVENOUS; SUBCUTANEOUS at 21:46

## 2023-06-12 RX ADMIN — Medication 1 DROP(S): at 07:04

## 2023-06-12 RX ADMIN — MEROPENEM 100 MILLIGRAM(S): 1 INJECTION INTRAVENOUS at 21:55

## 2023-06-12 RX ADMIN — HYDROMORPHONE HYDROCHLORIDE 3 MILLIGRAM(S): 2 INJECTION INTRAMUSCULAR; INTRAVENOUS; SUBCUTANEOUS at 13:46

## 2023-06-12 RX ADMIN — KETAMINE HYDROCHLORIDE 9.1 MG/KG/HR: 100 INJECTION INTRAMUSCULAR; INTRAVENOUS at 20:47

## 2023-06-12 RX ADMIN — SODIUM CHLORIDE 4 MILLILITER(S): 9 INJECTION INTRAMUSCULAR; INTRAVENOUS; SUBCUTANEOUS at 04:22

## 2023-06-12 RX ADMIN — Medication 1 DROP(S): at 17:32

## 2023-06-12 RX ADMIN — PANTOPRAZOLE SODIUM 40 MILLIGRAM(S): 20 TABLET, DELAYED RELEASE ORAL at 13:12

## 2023-06-12 RX ADMIN — HYDROMORPHONE HYDROCHLORIDE 3 MILLIGRAM(S): 2 INJECTION INTRAMUSCULAR; INTRAVENOUS; SUBCUTANEOUS at 01:01

## 2023-06-12 RX ADMIN — HYDROMORPHONE HYDROCHLORIDE 2 MILLIGRAM(S): 2 INJECTION INTRAMUSCULAR; INTRAVENOUS; SUBCUTANEOUS at 15:19

## 2023-06-12 RX ADMIN — CHLORHEXIDINE GLUCONATE 15 MILLILITER(S): 213 SOLUTION TOPICAL at 05:14

## 2023-06-12 RX ADMIN — Medication 975 MILLIGRAM(S): at 22:30

## 2023-06-12 RX ADMIN — Medication 975 MILLIGRAM(S): at 07:04

## 2023-06-12 RX ADMIN — Medication 81 MILLIGRAM(S): at 13:12

## 2023-06-12 RX ADMIN — HYDROMORPHONE HYDROCHLORIDE 2 MILLIGRAM(S): 2 INJECTION INTRAMUSCULAR; INTRAVENOUS; SUBCUTANEOUS at 18:51

## 2023-06-12 RX ADMIN — Medication 2.5 MILLIGRAM(S): at 23:32

## 2023-06-12 RX ADMIN — MEROPENEM 100 MILLIGRAM(S): 1 INJECTION INTRAVENOUS at 07:06

## 2023-06-12 RX ADMIN — Medication 3 MILLILITER(S): at 21:43

## 2023-06-12 RX ADMIN — Medication 60 MILLIGRAM(S): at 09:17

## 2023-06-12 RX ADMIN — HYDROMORPHONE HYDROCHLORIDE 3 MILLIGRAM(S): 2 INJECTION INTRAMUSCULAR; INTRAVENOUS; SUBCUTANEOUS at 10:46

## 2023-06-12 RX ADMIN — HEPARIN SODIUM 5000 UNIT(S): 5000 INJECTION INTRAVENOUS; SUBCUTANEOUS at 07:05

## 2023-06-12 RX ADMIN — Medication 975 MILLIGRAM(S): at 07:05

## 2023-06-12 RX ADMIN — Medication 975 MILLIGRAM(S): at 10:16

## 2023-06-12 RX ADMIN — HEPARIN SODIUM 5000 UNIT(S): 5000 INJECTION INTRAVENOUS; SUBCUTANEOUS at 13:12

## 2023-06-12 RX ADMIN — SODIUM CHLORIDE 4 MILLILITER(S): 9 INJECTION INTRAMUSCULAR; INTRAVENOUS; SUBCUTANEOUS at 09:17

## 2023-06-12 RX ADMIN — CHLORHEXIDINE GLUCONATE 1 APPLICATION(S): 213 SOLUTION TOPICAL at 13:20

## 2023-06-12 RX ADMIN — Medication 975 MILLIGRAM(S): at 18:33

## 2023-06-12 RX ADMIN — HYDROMORPHONE HYDROCHLORIDE 3 MILLIGRAM(S): 2 INJECTION INTRAMUSCULAR; INTRAVENOUS; SUBCUTANEOUS at 17:31

## 2023-06-12 RX ADMIN — LACOSAMIDE 150 MILLIGRAM(S): 50 TABLET ORAL at 07:06

## 2023-06-12 RX ADMIN — MEROPENEM 100 MILLIGRAM(S): 1 INJECTION INTRAVENOUS at 13:06

## 2023-06-12 RX ADMIN — SODIUM CHLORIDE 4 MILLILITER(S): 9 INJECTION INTRAMUSCULAR; INTRAVENOUS; SUBCUTANEOUS at 17:35

## 2023-06-12 RX ADMIN — Medication 40 MILLIGRAM(S): at 05:14

## 2023-06-12 RX ADMIN — Medication 4 MILLILITER(S): at 06:06

## 2023-06-12 RX ADMIN — Medication 1 EACH: at 17:36

## 2023-06-12 RX ADMIN — HYDROMORPHONE HYDROCHLORIDE 3 MILLIGRAM(S): 2 INJECTION INTRAMUSCULAR; INTRAVENOUS; SUBCUTANEOUS at 01:31

## 2023-06-12 RX ADMIN — HYDROMORPHONE HYDROCHLORIDE 2 MILLIGRAM(S): 2 INJECTION INTRAMUSCULAR; INTRAVENOUS; SUBCUTANEOUS at 15:35

## 2023-06-12 RX ADMIN — HYDROMORPHONE HYDROCHLORIDE 3 MILLIGRAM(S): 2 INJECTION INTRAMUSCULAR; INTRAVENOUS; SUBCUTANEOUS at 07:21

## 2023-06-12 RX ADMIN — HYDROMORPHONE HYDROCHLORIDE 3 MILLIGRAM(S): 2 INJECTION INTRAMUSCULAR; INTRAVENOUS; SUBCUTANEOUS at 22:10

## 2023-06-12 RX ADMIN — Medication 975 MILLIGRAM(S): at 09:16

## 2023-06-12 NOTE — PROGRESS NOTE ADULT - CRITICAL CARE ATTENDING COMMENT
52 yo M complicated hospital course - CAD, TEVAR now c/b hemorrhagic pancreatitis, bacteremia, AHRF now s/p trach after failed extubation, ATN requiring CVVHD and now with renal recovery. Significant improvement over weekend after bronchoscopy confirming LLL purulent pna with + sputum cultures (klebsiella, enterobacter) and empiric antifungal therapy given high risk intra abdominal infection. HR improved, mental status improved, more comfortable with less opioid requirements. Fever curve downtrending. CXR improved. Weaned off levo. Vanc discontinued i/s/o neg MRSA swab, but will continue with meropenem and caspofungin until final culture sensitivities result. Tolerating CPAP, improve volume status and pulmonary clearance prior to trach collar trial (d/c mucomyst given no significant response, c/w hypertonic saline, frequent suctioning, chair position, PT/OT, mobilization. Extensive dependent edema, plan to increase lasix to 60mg IV, spot doses for goal net neg 1-2L. Continue to adv parenteral TF rate to goal, if continues to tolerate can decrease TPN. D/C fentanyl gtt and c/w PRN dilaudid pushes, precedex for RASS 0 to -1. D/C HD cath, will need new central access if unable to be entirely on NGT feeds by tomorrow.       Direct personal management at bed side and extensive interpretation of the data.  Plan was outlined and discussed in details with the housestaff.  Decision making of highest complexity  Action taken for acute disease activity to reflect the level of care provided:  - medication reconciliation  - review laboratory data  - review radiographic data

## 2023-06-12 NOTE — PROGRESS NOTE ADULT - SUBJECTIVE AND OBJECTIVE BOX
1. 16 Fr teal LUQ peripancreatic abscess "Left Pancreatic CODIE Drain" placed on 5/24 (attached to gravity bag):   -15 cc serosanguinous output in 24 hrs. 95 cc in previous 24 hrs. Daily irrigation with 250 cc NS per primary team.     2. 16 Fr teal LUQ hematoma "Left Abdominal Hematoma CODIE Drain" placed on 5/25 and upsized on 5/26:   -30 cc dark green-brown (likely hemolyzing blood products) output in 24 hrs. 50 cc in previous 24 hrs. Flushed easily with 3cc sterile NS.

## 2023-06-12 NOTE — PROGRESS NOTE ADULT - SUBJECTIVE AND OBJECTIVE BOX
INTERVAL HPI/OVERNIGHT EVENTS: Had BAL 6/9 with mucopurulent secretions LLL. Intermittently febrile.    ROS: UTO      ANTIBIOTICS/RELEVANT:    MEDICATIONS  (STANDING):  acetaminophen   Oral Liquid .. 975 milliGRAM(s) Enteral Tube every 6 hours  albuterol/ipratropium for Nebulization 3 milliLiter(s) Nebulizer every 6 hours  artificial  tears Solution 1 Drop(s) Both EYES two times a day  aspirin  chewable 81 milliGRAM(s) Enteral Tube every 24 hours  caspofungin IVPB 50 milliGRAM(s) IV Intermittent every 24 hours  chlorhexidine 0.12% Liquid 15 milliLiter(s) Oral Mucosa every 12 hours  chlorhexidine 2% Cloths 1 Application(s) Topical daily  dexMEDEtomidine Infusion 0.7 MICROgram(s)/kG/Hr (12.3 mL/Hr) IV Continuous <Continuous>  dextrose 5%. 1000 milliLiter(s) (100 mL/Hr) IV Continuous <Continuous>  dextrose 50% Injectable 25 Gram(s) IV Push once  fentaNYL   Infusion... 0.2 MICROgram(s)/kG/Hr (0.91 mL/Hr) IV Continuous <Continuous>  glucagon  Injectable 1 milliGRAM(s) IntraMuscular once  heparin   Injectable 5000 Unit(s) SubCutaneous every 8 hours  HYDROmorphone  Injectable 3 milliGRAM(s) IV Push every 4 hours  insulin lispro (ADMELOG) corrective regimen sliding scale   SubCutaneous every 6 hours  ketamine Infusion 0.2 mG/kG/Hr (9.1 mL/Hr) IV Continuous <Continuous>  lacosamide IVPB 250 milliGRAM(s) IV Intermittent every 12 hours  meropenem  IVPB 1000 milliGRAM(s) IV Intermittent every 8 hours  pantoprazole  Injectable 40 milliGRAM(s) IV Push daily  Parenteral Nutrition - Adult 1 Each (29 mL/Hr) TPN Continuous <Continuous>  Parenteral Nutrition - Adult 1 Each (46 mL/Hr) TPN Continuous <Continuous>  sodium chloride 3%  Inhalation 4 milliLiter(s) Inhalation every 6 hours    MEDICATIONS  (PRN):  dextrose Oral Gel 15 Gram(s) Oral once PRN Blood Glucose LESS THAN 70 milliGRAM(s)/deciliter  sodium chloride 0.9% lock flush 10 milliLiter(s) IV Push every 1 hour PRN Pre/post blood products, medications, blood draw, and to maintain line patency        Vital Signs Last 24 Hrs  T(C): 37.2 (12 Jun 2023 14:05), Max: 38.1 (12 Jun 2023 05:52)  T(F): 99 (12 Jun 2023 14:05), Max: 100.6 (12 Jun 2023 05:52)  HR: 108 (12 Jun 2023 15:00) (86 - 126)  BP: --  BP(mean): --  RR: 20 (12 Jun 2023 15:00) (10 - 29)  SpO2: 100% (12 Jun 2023 15:00) (98% - 100%)    Parameters below as of 12 Jun 2023 15:00  Patient On (Oxygen Delivery Method): ventilator, CPAP    O2 Concentration (%): 40    PHYSICAL EXAM:  Constitutional: NAD  Eyes: IVAN, EOMI  Ear/Nose/Throat: no oral lesion, no sinus tenderness on percussion	  Neck: no JVD, no lymphadenopathy, supple  Respiratory: CTA marti  Cardiovascular: S1S2 RRR, no murmurs  Gastrointestinal:soft, (+) BS, no HSM  Extremities:no e/e/c  Vascular: DP Pulse:	right normal; left normal      LABS:                        7.6    9.05  )-----------( 297      ( 12 Jun 2023 05:50 )             23.8     06-12    141  |  105  |  43<H>  ----------------------------<  129<H>  4.0   |  28  |  0.79    Ca    8.2<L>      12 Jun 2023 14:06  Phos  4.7     06-12  Mg     2.1     06-12    TPro  6.0  /  Alb  2.3<L>  /  TBili  0.7  /  DBili  x   /  AST  16  /  ALT  13  /  AlkPhos  208<H>  06-12          MICROBIOLOGY: Culture - Bronchial (06.09.23 @ 18:54)    -  Tobramycin: S <=2   -  Trimethoprim/Sulfamethoxazole: S <=0.5/9.5   Gram Stain:   Moderate epithelial cells  Moderate WBC's  Rare Gram Negative Rods   -  Ampicillin: R >16 These ampicillin results predict results for amoxicillin   -  Ampicillin/Sulbactam: S 8/4 Enterobacter, Klebsiella aerogenes, Citrobacter, and Serratia may develop resistance during prolonged therapy (3-4 days)   -  Cefazolin: S <=2 Enterobacter, Klebsiella aerogenes, Citrobacter, and Serratia may develop resistance during prolonged therapy (3-4 days)   -  Ceftriaxone: S <=1 Enterobacter, Klebsiella aerogenes, Citrobacter, and Serratia may develop resistance during prolonged therapy   -  Ciprofloxacin: S <=0.25   -  Ertapenem: S <=0.5   -  Gentamicin: S <=2   -  Piperacillin/Tazobactam: S <=8   Specimen Source: Bronch Wash LLL bronchial wash   Culture Results:   Numerous Klebsiella pneumoniae  Numerous Enterobacter cloacae complex  Susceptibility to follow.  Rare Normal Respiratory Loni present   Organism Identification: Klebsiella pneumoniae   Organism: Klebsiella pneumoniae   Method Type: LIZETTE        RADIOLOGY & ADDITIONAL STUDIES: reviewed; CXR LLL opacity

## 2023-06-12 NOTE — PROGRESS NOTE ADULT - SUBJECTIVE AND OBJECTIVE BOX
ON: neg positive - added lasix 40, gave additional dilaudid 2, temp 100.7 - mike bl cx x2  6/11: per renal, stop aquaphoresis and give lasix to keep net negative instead. temp 100.7, diarrhea from jevity tube feeds. switched to vital 1.0, given lasix 40 x 2, bronch cx kleb and enterobacter      SUBJECTIVE: Patient seen and examined bedside; arousable, nodding when asked questions, denied pain.    aspirin  chewable 81 milliGRAM(s) Enteral Tube every 24 hours  caspofungin IVPB 50 milliGRAM(s) IV Intermittent every 24 hours  heparin   Injectable 5000 Unit(s) SubCutaneous every 8 hours  meropenem  IVPB 1000 milliGRAM(s) IV Intermittent every 8 hours      Vital Signs Last 24 Hrs  T(C): 38.1 (12 Jun 2023 05:52), Max: 38.2 (11 Jun 2023 10:00)  T(F): 100.6 (12 Jun 2023 05:52), Max: 100.7 (11 Jun 2023 10:00)  HR: 100 (12 Jun 2023 07:00) (90 - 123)  BP: --  BP(mean): --  RR: 15 (12 Jun 2023 07:00) (10 - 27)  SpO2: 100% (12 Jun 2023 07:00) (98% - 100%)    Parameters below as of 12 Jun 2023 06:03  Patient On (Oxygen Delivery Method): ventilator      I&O's Detail    11 Jun 2023 07:01  -  12 Jun 2023 07:00  --------------------------------------------------------  IN:    Dexmedetomidine: 381.6 mL    FentaNYL: 43.2 mL    IV PiggyBack: 550 mL    IV PiggyBack: 300 mL    IV PiggyBack: 100 mL    IV PiggyBack: 200 mL    Jevity 1.2: 300 mL    Ketamine: 218.4 mL    TPN (Total Parenteral Nutrition): 468 mL    Vital1.0: 800 mL  Total IN: 3361.2 mL    OUT:    Bulb (mL): 30 mL    Bulb (mL): 50 mL    Bulb (mL): 260 mL    Drain (mL): 15 mL    Drain (mL): 30 mL    Incontinent per Condom Catheter (mL): 2053 mL    Norepinephrine: 0 mL  Total OUT: 2438 mL    Total NET: 923.2 mL      12 Jun 2023 07:01  -  12 Jun 2023 08:31  --------------------------------------------------------  IN:    Dexmedetomidine: 115.9 mL    FentaNYL: 1.8 mL    Ketamine: 9.1 mL    TPN (Total Parenteral Nutrition): 29 mL    Vital1.0: 50 mL  Total IN: 205.8 mL    OUT:  Total OUT: 0 mL    Total NET: 205.8 mL      PE:    General: NAD, resting comfortably in bed  C/V: NSR, s1 s2  Pulm: Trach to vent, nonlabored breathing, no respiratory distress, CPAP  Abd: Soft, NTND, staples in place; drains unchanged, all serosanguinous except retrop which still bilious  Extrem: WWP        LABS:                        7.6    9.05  )-----------( 297      ( 12 Jun 2023 05:50 )             23.8     06-12    141  |  104  |  43<H>  ----------------------------<  110<H>  4.6   |  27  |  0.81    Ca    8.6      12 Jun 2023 05:50  Phos  4.7     06-12  Mg     2.1     06-12    TPro  6.0  /  Alb  2.3<L>  /  TBili  0.7  /  DBili  x   /  AST  16  /  ALT  13  /  AlkPhos  208<H>  06-12          RADIOLOGY & ADDITIONAL STUDIES:

## 2023-06-12 NOTE — CHART NOTE - NSCHARTNOTEFT_GEN_A_CORE
Admitting Diagnosis:   Patient is a 53y old  Male who presents with a chief complaint of endoleak, abdominal pain (2023 10:12)      PAST MEDICAL & SURGICAL HISTORY:  HTN (hypertension)      Aortic dissection      CAD (coronary artery disease)      Seizure disorder      Seizure disorder      Hypertension      Status post endovascular aneurysm repair (EVAR)      S/P aortic bifurcation bypass graft      S/P CABG x 1          Current Nutrition Order:  TPN via central line; provides 38g AA, 150g dextrose [SMOF lipids d/c], 662 kcals, GIR of 1.49, 0.54g AA/kg, 700ml @29ml/hr  Vital 1.0 @50ml/hr x 24hrs; provides 1200ml total volume, 1200 kcals, 48g protein, 872.4ml free water daily via PEJ    PO Intake: Good (%) [   ]  Fair (50-75%) [   ] Poor (<25%) [   ] - N/A    GI Issues: previously noted with diarrhea, now improved per MD    Pain: No pain/absence of non-verbal indicators of pain    Skin Integrity: multiple PUs- Coccyx stage II, clavicle stage II, scapula stage I; CODIE drain x 5, 2+ edema to b/l UE & LEs    Labs:       141  |  104  |  43<H>  ----------------------------<  110<H>  4.6   |  27  |  0.81    Ca    8.6      2023 05:50  Phos  4.7     12  Mg     2.1     -12    TPro  6.0  /  Alb  2.3<L>  /  TBili  0.7  /  DBili  x   /  AST  16  /  ALT  13  /  AlkPhos  208<H>  06-12    CAPILLARY BLOOD GLUCOSE      POCT Blood Glucose.: 82 mg/dL (2023 05:54)  POCT Blood Glucose.: 114 mg/dL (2023 22:21)  POCT Blood Glucose.: 111 mg/dL (2023 16:56)  POCT Blood Glucose.: 91 mg/dL (2023 15:21)  POCT Blood Glucose.: 102 mg/dL (2023 13:02)      Medications:  MEDICATIONS  (STANDING):  acetaminophen   Oral Liquid .. 975 milliGRAM(s) Enteral Tube every 6 hours  albuterol/ipratropium for Nebulization 3 milliLiter(s) Nebulizer every 6 hours  artificial  tears Solution 1 Drop(s) Both EYES two times a day  aspirin  chewable 81 milliGRAM(s) Enteral Tube every 24 hours  caspofungin IVPB 50 milliGRAM(s) IV Intermittent every 24 hours  chlorhexidine 0.12% Liquid 15 milliLiter(s) Oral Mucosa every 12 hours  chlorhexidine 2% Cloths 1 Application(s) Topical daily  dexMEDEtomidine Infusion 0.7 MICROgram(s)/kG/Hr (12.3 mL/Hr) IV Continuous <Continuous>  dextrose 5%. 1000 milliLiter(s) (100 mL/Hr) IV Continuous <Continuous>  dextrose 50% Injectable 25 Gram(s) IV Push once  fentaNYL   Infusion... 0.2 MICROgram(s)/kG/Hr (0.91 mL/Hr) IV Continuous <Continuous>  glucagon  Injectable 1 milliGRAM(s) IntraMuscular once  heparin   Injectable 5000 Unit(s) SubCutaneous every 8 hours  HYDROmorphone  Injectable 3 milliGRAM(s) IV Push every 4 hours  insulin lispro (ADMELOG) corrective regimen sliding scale   SubCutaneous every 6 hours  ketamine Infusion 0.2 mG/kG/Hr (9.1 mL/Hr) IV Continuous <Continuous>  lacosamide IVPB 250 milliGRAM(s) IV Intermittent every 12 hours  meropenem  IVPB 1000 milliGRAM(s) IV Intermittent every 8 hours  pantoprazole  Injectable 40 milliGRAM(s) IV Push daily  Parenteral Nutrition - Adult 1 Each (29 mL/Hr) TPN Continuous <Continuous>  Parenteral Nutrition - Adult 1 Each (29 mL/Hr) TPN Continuous <Continuous>  sodium chloride 3%  Inhalation 4 milliLiter(s) Inhalation every 6 hours    MEDICATIONS  (PRN):  dextrose Oral Gel 15 Gram(s) Oral once PRN Blood Glucose LESS THAN 70 milliGRAM(s)/deciliter  HYDROmorphone  Injectable 2 milliGRAM(s) IV Push once PRN breakthrough pain  sodium chloride 0.9% lock flush 10 milliLiter(s) IV Push every 1 hour PRN Pre/post blood products, medications, blood draw, and to maintain line patency      Weight: 91kg noted 6/10  Daily 70kg dosing wt previously noted May & Ghazala '23      Weight Change: Current wt likely falsely elevated 2/2 edema    Estimated energy needs:   Previous wt of 70kg used for energy calculations as falls within % IBW  Needs adjusted for age, clinical conditions, pressure ulcers  25-30 kcals/[id number] kcals/day  1.2-1.7 g/k-119g protein/day  Fluids per team.    Subjective:   53yMale w/ PMHx of HTN, type A aortic dissection s/p Dacron grafts, AV resuspension in , CAD s/p CABG x 1 SVG to RCA (2013 with Dr. Medina), seizure disorder, recent admission 3/20- for replacement of transverse aortic arch, second stage TEVAR and aorto-axillary bypass, AV replacement (bio 23mm), and CABG x 1 (SVG-RCA). Pt found to have endo leak and taken to OR for TEVAR revision on , Post op course c/b Klebsiella bacteremia (5/15), resp failure requiring intubation on , Mucus plugging s/p Bronch  and PEA arrest. Post operatively pt also found to have hemorrhagic pancreatitis with venu-pancreatic abscess possibly 2* to Depakote therefore s/p IR aspiration of peripancreatic fluid collection and paracentesis on , IR venu-pancreatic abscess drainage and placement of drainage catheter on . Pt then transferred from CTICU to SICU for further mgmt of hemorrhagic pancreatitis on . s/p IR placement of 2 new drainage catheters and catheter exchange on . LUQ drainage . OR  exlap washout & replacement of 3 new JPs and abthera vac with open abdomen. RTOR , no bleeding, evac of old blood, placement of feeding J-tube. Failed extubation 6/3: s/p Trach .    Pt seen awake, undergoing PT. On CPAP as tolerated. TMax: 38.1 C. HR trending high. BP WNL. MAPs >65 mmHg. I&Os x 24hrs: 1769.6 [600ml TF intake] / 720 = +923.2ml net. Oliguria resolved, aquaphoresis discontinued. No longer on pressors. Pt remains on EN w/ supplemental TPN. Previously noted with diarrhea- formula changed from Jevity to Vital yesterday per MD, no s/s GI distress since. Current EN provision meets 68.7% / 45.7% minimum est. kcal & protein needs. POC BG trending  mg/dL x 24hrs. BUN 43 [H] & trending up. Creat & GFR WNL. Phos 4.7 [H], trend. Meds reviewed. To continue EN & supplemental TPN today per TPN. When medically feasible & tolerance established, can increase EN to better meet nutrient needs & wean TPN. See detailed recommendations below. RD to monitor & f/u per nutrition protocol.     Previous Nutrition Diagnosis: Increased nutrients RT increased demands AEB Vent     Active [ X ]  Resolved [   ]    If resolved, new PES:     Goal:  Pt to meet at least 75% of nutritional needs consistently via most feasible route    Recommendations:  1. Continue EN via PEJ as tolerated- Vital 1.0 @50ml/hr x 24hrs; provides 1200ml total volume, 1200 kcals, 48g protein, 872.4ml free water daily via PEJ  - goal: Vital 1.5 @50ml/hr x 24hrs; provides 1200ml total volume, 1800ml kcals, 81g protein, 916.8ml free water daily  - additional 1 LPS daily [100kcals, 15g protein]  2. Can increase TPN to 159g dextrose, 40g amino acids to provide 700kcals [77% from dextrose], 1.57 GIR  - EN + TPN to provide 1900 kcals, 1.25g protein/kg, 22.1 non-protein kcals/kg  - wean TPN as indicated  3. Monitor renal function  - adj. provision as indicated  4. Monitor labs, skin, GI function  * discussed with SICU team    Education: deferred     Risk Level: High [  X ] Moderate [   ] Low [   ]

## 2023-06-12 NOTE — PROGRESS NOTE ADULT - ASSESSMENT
53 year-old male with PMHx HTN, type A aortic dissection s/p Dacron grafts, AV resuspension in 2013, CAD s/p CABG x 1 SVG to RCA (5/2013 with Dr. Medina), seizure disorder, recent admission 3/20-4/14 for replacement of transverse aortic arch, second stage TEVAR 5/5 with course c/b peripancreatic/RP hemorrhage/hematoma formation s/p multiple washouts on antibiotics. Epilepsy team initially consulted for history and epilepsy and suspected seizure on 5/12 thought to be provoked. Additional event on 5/14/23 that did not correlate with findings on EEG recordings. Vimpat was increased and he was tapered off Depakote previously due to concerns for possible drug-related hemorrhagic pancreatitis, however, seems less likely and currently pancreatitis is thought to be secondary to gallstones. CTH from 5/18 without acute pathology. Current EEG negative for epileptiform activity    Recommendations:  - d/c vEEG  - Continue Vimpat 250mg BID   - continue to hold depakote, especially given thrombocytopenia  - Ativan 2mg IVP for seizures > 2mins  - Keep Mg>2 and K >4.   - Rest of management per primary team  - call for new or worsening neuro symptoms

## 2023-06-12 NOTE — PROGRESS NOTE ADULT - SUBJECTIVE AND OBJECTIVE BOX
***INCOMPLETE***    Inteval history:    No events overnight. No complaints currently.    ROS  As above, otherwise negative for constitutional/HEENT/CV/pulm/GI//MSK/neuro/derm/endocrine/psych.     MEDICATIONS  (STANDING):  acetaminophen   Oral Liquid .. 975 milliGRAM(s) Enteral Tube every 6 hours  albuterol/ipratropium for Nebulization 3 milliLiter(s) Nebulizer every 6 hours  artificial  tears Solution 1 Drop(s) Both EYES two times a day  aspirin  chewable 81 milliGRAM(s) Enteral Tube every 24 hours  caspofungin IVPB 50 milliGRAM(s) IV Intermittent every 24 hours  chlorhexidine 0.12% Liquid 15 milliLiter(s) Oral Mucosa every 12 hours  chlorhexidine 2% Cloths 1 Application(s) Topical daily  dexMEDEtomidine Infusion 0.7 MICROgram(s)/kG/Hr (12.3 mL/Hr) IV Continuous <Continuous>  dextrose 5%. 1000 milliLiter(s) (100 mL/Hr) IV Continuous <Continuous>  dextrose 50% Injectable 25 Gram(s) IV Push once  fentaNYL   Infusion... 0.4 MICROgram(s)/kG/Hr (1.4 mL/Hr) IV Continuous <Continuous>  glucagon  Injectable 1 milliGRAM(s) IntraMuscular once  heparin   Injectable 5000 Unit(s) SubCutaneous every 8 hours  HYDROmorphone  Injectable 3 milliGRAM(s) IV Push every 4 hours  insulin lispro (ADMELOG) corrective regimen sliding scale   SubCutaneous every 6 hours  ketamine Infusion 0.2 mG/kG/Hr (9.1 mL/Hr) IV Continuous <Continuous>  lacosamide IVPB 250 milliGRAM(s) IV Intermittent every 12 hours  meropenem  IVPB 1000 milliGRAM(s) IV Intermittent every 8 hours  pantoprazole  Injectable 40 milliGRAM(s) IV Push daily  Parenteral Nutrition - Adult 1 Each (29 mL/Hr) TPN Continuous <Continuous>  sodium chloride 3%  Inhalation 4 milliLiter(s) Inhalation every 6 hours    MEDICATIONS  (PRN):  dextrose Oral Gel 15 Gram(s) Oral once PRN Blood Glucose LESS THAN 70 milliGRAM(s)/deciliter  HYDROmorphone  Injectable 2 milliGRAM(s) IV Push once PRN breakthrough pain  sodium chloride 0.9% lock flush 10 milliLiter(s) IV Push every 1 hour PRN Pre/post blood products, medications, blood draw, and to maintain line patency      T(C): 37.4 (06-12-23 @ 10:00), Max: 38.1 (06-12-23 @ 05:52)  HR: 95 (06-12-23 @ 08:29) (90 - 121)  BP: --  RR: 15 (06-12-23 @ 07:00) (10 - 27)  SpO2: 100% (06-12-23 @ 08:29) (98% - 100%)  Wt(kg): --    General:  Constitutional:  Sitting comfortably in NAD.  Ears, Nose, Throat: no abnormalities, mucus membranes moist  Neck: supple, no lymphadenopathy  Extremities: no edema, clubbing or cyanosis  Skin: no rash or neurocutaneous signs     Cognitive:  Orientation, language, memory and knowledge screens intact.    Cranial Nerves:  II: MITCHELL. III/IV/VI: EOM Full.  Absent nystagmus  V1V2V3: Symmetric, VII: Face appears symmetric VIII: Normal to screening, IX/X: Palate Elevates Symmetrical  XI: Trapezius Symmetric  XII: Tongue midline  Motor:  Power: no pronator drift, power 5/5 distal U/E  Tone: normal x 4 limbs  No tremor  Coordination/Gait:  Finger-nose intact, normal finger taps and rapid-alternating movements,   Narrow based gait, tandem forward   hops well on both feet  Reflexes:  DTR: 2+ symmetric all 4 limbs, no clonus      Investigations:  CBC Full  -  ( 12 Jun 2023 05:50 )  WBC Count : 9.05 K/uL  RBC Count : 2.57 M/uL  Hemoglobin : 7.6 g/dL  Hematocrit : 23.8 %  Platelet Count - Automated : 297 K/uL  Mean Cell Volume : 92.6 fl  Mean Cell Hemoglobin : 29.6 pg  Mean Cell Hemoglobin Concentration : 31.9 gm/dL  Auto Neutrophil # : x  Auto Lymphocyte # : x  Auto Monocyte # : x  Auto Eosinophil # : x  Auto Basophil # : x  Auto Neutrophil % : x  Auto Lymphocyte % : x  Auto Monocyte % : x  Auto Eosinophil % : x  Auto Basophil % : x    06-12    141  |  104  |  43<H>  ----------------------------<  110<H>  4.6   |  27  |  0.81    Ca    8.6      12 Jun 2023 05:50  Phos  4.7     06-12  Mg     2.1     06-12    TPro  6.0  /  Alb  2.3<L>  /  TBili  0.7  /  DBili  x   /  AST  16  /  ALT  13  /  AlkPhos  208<H>  06-12    LIVER FUNCTIONS - ( 12 Jun 2023 05:50 )  Alb: 2.3 g/dL / Pro: 6.0 g/dL / ALK PHOS: 208 U/L / ALT: 13 U/L / AST: 16 U/L / GGT: x                 EEG:

## 2023-06-12 NOTE — PROGRESS NOTE ADULT - SUBJECTIVE AND OBJECTIVE BOX
INTERVAL HPI/OVERNIGHT EVENTS:  ON: neg positive - added lasix 40, gave additional dilaudid 2, temp 100.7 - mike bl cx x2    SUBJECTIVE:  Patient seen and examined by chief resident and team on AM rounds. When examined this morning, patient appeared comfortable in no acute distress. Pain appears to be better controlled. Febrile to high 100s overnight, HR in mid to high 90s. Patient was on fentanyl gtt at 0.4, weaned and capped at 0.2. Precedex at 0.5, Ketamine at 0.2. Voiding adequately with lasix, will continue to closely monitor UOP and dose as needed.     MEDICATIONS  (STANDING):  acetaminophen   Oral Liquid .. 975 milliGRAM(s) Enteral Tube every 6 hours  albuterol/ipratropium for Nebulization 3 milliLiter(s) Nebulizer every 6 hours  artificial  tears Solution 1 Drop(s) Both EYES two times a day  aspirin  chewable 81 milliGRAM(s) Enteral Tube every 24 hours  caspofungin IVPB 50 milliGRAM(s) IV Intermittent every 24 hours  chlorhexidine 0.12% Liquid 15 milliLiter(s) Oral Mucosa every 12 hours  chlorhexidine 2% Cloths 1 Application(s) Topical daily  dexMEDEtomidine Infusion 0.7 MICROgram(s)/kG/Hr (12.3 mL/Hr) IV Continuous <Continuous>  dextrose 5%. 1000 milliLiter(s) (100 mL/Hr) IV Continuous <Continuous>  dextrose 50% Injectable 25 Gram(s) IV Push once  fentaNYL   Infusion... 0.2 MICROgram(s)/kG/Hr (0.91 mL/Hr) IV Continuous <Continuous>  glucagon  Injectable 1 milliGRAM(s) IntraMuscular once  heparin   Injectable 5000 Unit(s) SubCutaneous every 8 hours  HYDROmorphone  Injectable 3 milliGRAM(s) IV Push every 4 hours  insulin lispro (ADMELOG) corrective regimen sliding scale   SubCutaneous every 6 hours  ketamine Infusion 0.2 mG/kG/Hr (9.1 mL/Hr) IV Continuous <Continuous>  lacosamide IVPB 250 milliGRAM(s) IV Intermittent every 12 hours  meropenem  IVPB 1000 milliGRAM(s) IV Intermittent every 8 hours  pantoprazole  Injectable 40 milliGRAM(s) IV Push daily  Parenteral Nutrition - Adult 1 Each (46 mL/Hr) TPN Continuous <Continuous>  Parenteral Nutrition - Adult 1 Each (29 mL/Hr) TPN Continuous <Continuous>  sodium chloride 3%  Inhalation 4 milliLiter(s) Inhalation every 6 hours    MEDICATIONS  (PRN):  dextrose Oral Gel 15 Gram(s) Oral once PRN Blood Glucose LESS THAN 70 milliGRAM(s)/deciliter  HYDROmorphone  Injectable 2 milliGRAM(s) IV Push once PRN breakthrough pain  sodium chloride 0.9% lock flush 10 milliLiter(s) IV Push every 1 hour PRN Pre/post blood products, medications, blood draw, and to maintain line patency      Vital Signs Last 24 Hrs  T(C): 37.4 (12 Jun 2023 10:00), Max: 38.1 (12 Jun 2023 05:52)  T(F): 99.4 (12 Jun 2023 10:00), Max: 100.6 (12 Jun 2023 05:52)  HR: 98 (12 Jun 2023 13:00) (86 - 126)  BP: --  BP(mean): --  RR: 12 (12 Jun 2023 13:00) (10 - 29)  SpO2: 100% (12 Jun 2023 13:00) (98% - 100%)    Parameters below as of 12 Jun 2023 13:00  Patient On (Oxygen Delivery Method): ventilator, CPAP    O2 Concentration (%): 40    PHYSICAL EXAM:  T(C): 37.4 (06-12-23 @ 10:00), Max: 38.1 (06-12-23 @ 05:52)  HR: 98 (06-12-23 @ 13:00) (86 - 126)  BP: --  RR: 12 (06-12-23 @ 13:00) (10 - 29)  SpO2: 100% (06-12-23 @ 13:00) (98% - 100%)    CONSTITUTIONAL: Well groomed, no apparent distress  EYES: PERRLA and symmetric, EOMI, No conjunctival or scleral injection, non-icteric  ENMT: Oral mucosa with moist membranes. Normal dentition; no pharyngeal injection or exudates             NECK: Supple, symmetric and without tracheal deviation   RESP: No respiratory distress, no use of accessory muscles; CTA b/l, no WRR  CV: RRR, +S1S2, no MRG; no JVD; no peripheral edema  GI: Soft, NT, ND, no rebound, no guarding; no palpable masses; no hepatosplenomegaly; no hernia palpated  LYMPH: No cervical LAD or tenderness; no axillary LAD or tenderness; no inguinal LAD or tenderness  MSK: Normal gait; No digital clubbing or cyanosis; examination of the (head/neck/spine/ribs/pelvis, RUE, LUE, RLE, LLE) without misalignment,            Normal ROM without pain, no spinal tenderness, normal muscle strength/tone  SKIN: No rashes or ulcers noted; no subcutaneous nodules or induration palpable  NEURO: CN II-XII intact; normal reflexes in upper and lower extremities, sensation intact in upper and lower extremities b/l to light touch   PSYCH: Appropriate insight/judgment; A+O x 3, mood and affect appropriate, recent/remote memory intact        I&O's Detail    11 Jun 2023 07:01  -  12 Jun 2023 07:00  --------------------------------------------------------  IN:    Dexmedetomidine: 381.6 mL    FentaNYL: 43.2 mL    IV PiggyBack: 550 mL    IV PiggyBack: 300 mL    IV PiggyBack: 100 mL    IV PiggyBack: 200 mL    Jevity 1.2: 300 mL    Ketamine: 218.4 mL    TPN (Total Parenteral Nutrition): 468 mL    Vital1.0: 800 mL  Total IN: 3361.2 mL    OUT:    Bulb (mL): 30 mL    Bulb (mL): 50 mL    Bulb (mL): 260 mL    Drain (mL): 15 mL    Drain (mL): 30 mL    Incontinent per Condom Catheter (mL): 2053 mL    Norepinephrine: 0 mL  Total OUT: 2438 mL    Total NET: 923.2 mL      12 Jun 2023 07:01  -  12 Jun 2023 13:55  --------------------------------------------------------  IN:    Dexmedetomidine: 115.9 mL    Dexmedetomidine: 31.9 mL    Enteral Tube Flush: 60 mL    FentaNYL: 5.4 mL    FentaNYL: 0.9 mL    Ketamine: 36.4 mL    TPN (Total Parenteral Nutrition): 116 mL    Vital1.0: 200 mL  Total IN: 566.5 mL    OUT:    Incontinent per Condom Catheter (mL): 200 mL  Total OUT: 200 mL    Total NET: 366.5 mL          LABS:                        7.6    9.05  )-----------( 297      ( 12 Jun 2023 05:50 )             23.8     06-12    141  |  104  |  43<H>  ----------------------------<  110<H>  4.6   |  27  |  0.81    Ca    8.6      12 Jun 2023 05:50  Phos  4.7     06-12  Mg     2.1     06-12    TPro  6.0  /  Alb  2.3<L>  /  TBili  0.7  /  DBili  x   /  AST  16  /  ALT  13  /  AlkPhos  208<H>  06-12          RADIOLOGY & ADDITIONAL STUDIES: INTERVAL HPI/OVERNIGHT EVENTS:  ON: neg positive - added lasix 40, gave additional dilaudid 2, temp 100.7 - mike bl cx x2    SUBJECTIVE:  Patient seen and examined by chief resident and team on AM rounds. When examined this morning, patient appeared comfortable in no acute distress. Pain appears to be better controlled. Febrile to high 100s overnight, HR in mid to high 90s. Patient was on fentanyl gtt at 0.4, weaned and capped at 0.2. Precedex at 0.5, Ketamine at 0.2. Voiding adequately with lasix, will continue to closely monitor UOP and dose as needed.     MEDICATIONS  (STANDING):  acetaminophen   Oral Liquid .. 975 milliGRAM(s) Enteral Tube every 6 hours  albuterol/ipratropium for Nebulization 3 milliLiter(s) Nebulizer every 6 hours  artificial  tears Solution 1 Drop(s) Both EYES two times a day  aspirin  chewable 81 milliGRAM(s) Enteral Tube every 24 hours  caspofungin IVPB 50 milliGRAM(s) IV Intermittent every 24 hours  chlorhexidine 0.12% Liquid 15 milliLiter(s) Oral Mucosa every 12 hours  chlorhexidine 2% Cloths 1 Application(s) Topical daily  dexMEDEtomidine Infusion 0.7 MICROgram(s)/kG/Hr (12.3 mL/Hr) IV Continuous <Continuous>  dextrose 5%. 1000 milliLiter(s) (100 mL/Hr) IV Continuous <Continuous>  dextrose 50% Injectable 25 Gram(s) IV Push once  fentaNYL   Infusion... 0.2 MICROgram(s)/kG/Hr (0.91 mL/Hr) IV Continuous <Continuous>  glucagon  Injectable 1 milliGRAM(s) IntraMuscular once  heparin   Injectable 5000 Unit(s) SubCutaneous every 8 hours  HYDROmorphone  Injectable 3 milliGRAM(s) IV Push every 4 hours  insulin lispro (ADMELOG) corrective regimen sliding scale   SubCutaneous every 6 hours  ketamine Infusion 0.2 mG/kG/Hr (9.1 mL/Hr) IV Continuous <Continuous>  lacosamide IVPB 250 milliGRAM(s) IV Intermittent every 12 hours  meropenem  IVPB 1000 milliGRAM(s) IV Intermittent every 8 hours  pantoprazole  Injectable 40 milliGRAM(s) IV Push daily  Parenteral Nutrition - Adult 1 Each (46 mL/Hr) TPN Continuous <Continuous>  Parenteral Nutrition - Adult 1 Each (29 mL/Hr) TPN Continuous <Continuous>  sodium chloride 3%  Inhalation 4 milliLiter(s) Inhalation every 6 hours    MEDICATIONS  (PRN):  dextrose Oral Gel 15 Gram(s) Oral once PRN Blood Glucose LESS THAN 70 milliGRAM(s)/deciliter  HYDROmorphone  Injectable 2 milliGRAM(s) IV Push once PRN breakthrough pain  sodium chloride 0.9% lock flush 10 milliLiter(s) IV Push every 1 hour PRN Pre/post blood products, medications, blood draw, and to maintain line patency      Vital Signs Last 24 Hrs  T(C): 37.4 (12 Jun 2023 10:00), Max: 38.1 (12 Jun 2023 05:52)  T(F): 99.4 (12 Jun 2023 10:00), Max: 100.6 (12 Jun 2023 05:52)  HR: 98 (12 Jun 2023 13:00) (86 - 126)  BP: --  BP(mean): --  RR: 12 (12 Jun 2023 13:00) (10 - 29)  SpO2: 100% (12 Jun 2023 13:00) (98% - 100%)    Parameters below as of 12 Jun 2023 13:00  Patient On (Oxygen Delivery Method): ventilator, CPAP    O2 Concentration (%): 40    PHYSICAL EXAM:  T(C): 37.4 (06-12-23 @ 10:00), Max: 38.1 (06-12-23 @ 05:52)  HR: 98 (06-12-23 @ 13:00) (86 - 126)  BP: --  RR: 12 (06-12-23 @ 13:00) (10 - 29)  SpO2: 100% (06-12-23 @ 13:00) (98% - 100%)    CONSTITUTIONAL: no apparent distress  EYES: PERRLA and symmetric, EOMI, No conjunctival or scleral injection, non-icteric  ENMT: Oral mucosa with moist membranes.   RESP: No respiratory distress, no use of accessory muscles; CTA b/l, no WRR  CV: RRR, +S1S2, no MRG; no JVD; no peripheral edema  GI: Soft, NT, ND, no rebound, no guarding; no palpable masses; no hepatosplenomegaly; no hernia palpated  LYMPH: No cervical LAD or tenderness; no axillary LAD or tenderness; no inguinal LAD or tenderness  MSK: Normal gait; No digital clubbing or cyanosis; examination of the (head/neck/spine/ribs/pelvis, RUE, LUE, RLE, LLE) without misalignment,            Normal ROM without pain, no spinal tenderness, normal muscle strength/tone  SKIN: No rashes or ulcers noted; no subcutaneous nodules or induration palpable  NEURO: CN II-XII intact; normal reflexes in upper and lower extremities, sensation intact in upper and lower extremities b/l to light touch   PSYCH: Appropriate insight/judgment; A+O x 3, mood and affect appropriate, recent/remote memory intact        I&O's Detail    11 Jun 2023 07:01  -  12 Jun 2023 07:00  --------------------------------------------------------  IN:    Dexmedetomidine: 381.6 mL    FentaNYL: 43.2 mL    IV PiggyBack: 550 mL    IV PiggyBack: 300 mL    IV PiggyBack: 100 mL    IV PiggyBack: 200 mL    Jevity 1.2: 300 mL    Ketamine: 218.4 mL    TPN (Total Parenteral Nutrition): 468 mL    Vital1.0: 800 mL  Total IN: 3361.2 mL    OUT:    Bulb (mL): 30 mL    Bulb (mL): 50 mL    Bulb (mL): 260 mL    Drain (mL): 15 mL    Drain (mL): 30 mL    Incontinent per Condom Catheter (mL): 2053 mL    Norepinephrine: 0 mL  Total OUT: 2438 mL    Total NET: 923.2 mL      12 Jun 2023 07:01  -  12 Jun 2023 13:55  --------------------------------------------------------  IN:    Dexmedetomidine: 115.9 mL    Dexmedetomidine: 31.9 mL    Enteral Tube Flush: 60 mL    FentaNYL: 5.4 mL    FentaNYL: 0.9 mL    Ketamine: 36.4 mL    TPN (Total Parenteral Nutrition): 116 mL    Vital1.0: 200 mL  Total IN: 566.5 mL    OUT:    Incontinent per Condom Catheter (mL): 200 mL  Total OUT: 200 mL    Total NET: 366.5 mL          LABS:                        7.6    9.05  )-----------( 297      ( 12 Jun 2023 05:50 )             23.8     06-12    141  |  104  |  43<H>  ----------------------------<  110<H>  4.6   |  27  |  0.81    Ca    8.6      12 Jun 2023 05:50  Phos  4.7     06-12  Mg     2.1     06-12    TPro  6.0  /  Alb  2.3<L>  /  TBili  0.7  /  DBili  x   /  AST  16  /  ALT  13  /  AlkPhos  208<H>  06-12          RADIOLOGY & ADDITIONAL STUDIES: INTERVAL HPI/OVERNIGHT EVENTS:  ON: neg positive - added lasix 40, gave additional dilaudid 2, temp 100.7 - mike bl cx x2    SUBJECTIVE:  Patient seen and examined by chief resident and team on AM rounds. When examined this morning, patient appeared comfortable in no acute distress. Pain appears to be better controlled. Febrile to high 100s overnight, HR in mid to high 90s. Patient was on fentanyl gtt at 0.4, weaned and capped at 0.2. Precedex at 0.5, Ketamine at 0.2. Voiding adequately with lasix, will continue to closely monitor UOP and dose as needed.     MEDICATIONS  (STANDING):  acetaminophen   Oral Liquid .. 975 milliGRAM(s) Enteral Tube every 6 hours  albuterol/ipratropium for Nebulization 3 milliLiter(s) Nebulizer every 6 hours  artificial  tears Solution 1 Drop(s) Both EYES two times a day  aspirin  chewable 81 milliGRAM(s) Enteral Tube every 24 hours  caspofungin IVPB 50 milliGRAM(s) IV Intermittent every 24 hours  chlorhexidine 0.12% Liquid 15 milliLiter(s) Oral Mucosa every 12 hours  chlorhexidine 2% Cloths 1 Application(s) Topical daily  dexMEDEtomidine Infusion 0.7 MICROgram(s)/kG/Hr (12.3 mL/Hr) IV Continuous <Continuous>  dextrose 5%. 1000 milliLiter(s) (100 mL/Hr) IV Continuous <Continuous>  dextrose 50% Injectable 25 Gram(s) IV Push once  fentaNYL   Infusion... 0.2 MICROgram(s)/kG/Hr (0.91 mL/Hr) IV Continuous <Continuous>  glucagon  Injectable 1 milliGRAM(s) IntraMuscular once  heparin   Injectable 5000 Unit(s) SubCutaneous every 8 hours  HYDROmorphone  Injectable 3 milliGRAM(s) IV Push every 4 hours  insulin lispro (ADMELOG) corrective regimen sliding scale   SubCutaneous every 6 hours  ketamine Infusion 0.2 mG/kG/Hr (9.1 mL/Hr) IV Continuous <Continuous>  lacosamide IVPB 250 milliGRAM(s) IV Intermittent every 12 hours  meropenem  IVPB 1000 milliGRAM(s) IV Intermittent every 8 hours  pantoprazole  Injectable 40 milliGRAM(s) IV Push daily  Parenteral Nutrition - Adult 1 Each (46 mL/Hr) TPN Continuous <Continuous>  Parenteral Nutrition - Adult 1 Each (29 mL/Hr) TPN Continuous <Continuous>  sodium chloride 3%  Inhalation 4 milliLiter(s) Inhalation every 6 hours    MEDICATIONS  (PRN):  dextrose Oral Gel 15 Gram(s) Oral once PRN Blood Glucose LESS THAN 70 milliGRAM(s)/deciliter  HYDROmorphone  Injectable 2 milliGRAM(s) IV Push once PRN breakthrough pain  sodium chloride 0.9% lock flush 10 milliLiter(s) IV Push every 1 hour PRN Pre/post blood products, medications, blood draw, and to maintain line patency      Vital Signs Last 24 Hrs  T(C): 37.4 (12 Jun 2023 10:00), Max: 38.1 (12 Jun 2023 05:52)  T(F): 99.4 (12 Jun 2023 10:00), Max: 100.6 (12 Jun 2023 05:52)  HR: 98 (12 Jun 2023 13:00) (86 - 126)  BP: --  BP(mean): --  RR: 12 (12 Jun 2023 13:00) (10 - 29)  SpO2: 100% (12 Jun 2023 13:00) (98% - 100%)    Parameters below as of 12 Jun 2023 13:00  Patient On (Oxygen Delivery Method): ventilator, CPAP    O2 Concentration (%): 40    PHYSICAL EXAM:  T(C): 37.4 (06-12-23 @ 10:00), Max: 38.1 (06-12-23 @ 05:52)  HR: 98 (06-12-23 @ 13:00) (86 - 126)  BP: --  RR: 12 (06-12-23 @ 13:00) (10 - 29)  SpO2: 100% (06-12-23 @ 13:00) (98% - 100%)    CONSTITUTIONAL: no apparent distress. Sedated.   EYES: PERRLA and symmetric, EOMI, No conjunctival or scleral injection, non-icteric  ENMT: Oral mucosa with moist membranes.   RESP: Tracheostomy in place, with minimal dried old blood surrounding it. Connected to CPAP settings on the ventilator, PEEP 8, Pressure support 5, PIP 13. No respiratory distress, no use of accessory muscles; Lungs with some scattered ronchi b/l.   CV: RRR, +S1S2, no MRG  GI: soft, mild ttp around incisions. Midline incision c/d/i.  ; Condom catheter in place with clear, yellow urine.   MSK:   SKIN: No rashes or ulcers noted.  NEURO: Aroused with speech  PSYCH: Appropriate insight/judgment; A+O x 3, mood and affect appropriate, recent/remote memory intact  Drains: L. hemidiaphragm YANET ss, L. abdominal hematoma drain bilious, L. peripancreatic drain ss (more serous), L. retroperitoneal yanet ss (sang >serous), R. pelvic yanet ss.   LINES: R. IJ TLC, L. subcavian HD cath, R. A-line.         I&O's Detail    11 Jun 2023 07:01  -  12 Jun 2023 07:00  --------------------------------------------------------  IN:    Dexmedetomidine: 381.6 mL    FentaNYL: 43.2 mL    IV PiggyBack: 550 mL    IV PiggyBack: 300 mL    IV PiggyBack: 100 mL    IV PiggyBack: 200 mL    Jevity 1.2: 300 mL    Ketamine: 218.4 mL    TPN (Total Parenteral Nutrition): 468 mL    Vital1.0: 800 mL  Total IN: 3361.2 mL    OUT:    Bulb (mL): 30 mL    Bulb (mL): 50 mL    Bulb (mL): 260 mL    Drain (mL): 15 mL    Drain (mL): 30 mL    Incontinent per Condom Catheter (mL): 2053 mL    Norepinephrine: 0 mL  Total OUT: 2438 mL    Total NET: 923.2 mL      12 Jun 2023 07:01  -  12 Jun 2023 13:55  --------------------------------------------------------  IN:    Dexmedetomidine: 115.9 mL    Dexmedetomidine: 31.9 mL    Enteral Tube Flush: 60 mL    FentaNYL: 5.4 mL    FentaNYL: 0.9 mL    Ketamine: 36.4 mL    TPN (Total Parenteral Nutrition): 116 mL    Vital1.0: 200 mL  Total IN: 566.5 mL    OUT:    Incontinent per Condom Catheter (mL): 200 mL  Total OUT: 200 mL    Total NET: 366.5 mL          LABS:                        7.6    9.05  )-----------( 297      ( 12 Jun 2023 05:50 )             23.8     06-12    141  |  104  |  43<H>  ----------------------------<  110<H>  4.6   |  27  |  0.81    Ca    8.6      12 Jun 2023 05:50  Phos  4.7     06-12  Mg     2.1     06-12    TPro  6.0  /  Alb  2.3<L>  /  TBili  0.7  /  DBili  x   /  AST  16  /  ALT  13  /  AlkPhos  208<H>  06-12          RADIOLOGY & ADDITIONAL STUDIES: INTERVAL HPI/OVERNIGHT EVENTS:  ON: neg positive - added lasix 40, gave additional dilaudid 2, temp 100.7 - mike bl cx x2    SUBJECTIVE:  Patient seen and examined by chief resident and team on AM rounds. When examined this morning, patient appeared comfortable in no acute distress. Pain appears to be better controlled. Febrile to high 100s overnight, HR in mid to high 90s. Patient was on fentanyl gtt at 0.4, weaned and capped at 0.2. Precedex at 0.5, Ketamine at 0.2. Voiding adequately with lasix, will continue to closely monitor UOP and dose as needed.     MEDICATIONS  (STANDING):  acetaminophen   Oral Liquid .. 975 milliGRAM(s) Enteral Tube every 6 hours  albuterol/ipratropium for Nebulization 3 milliLiter(s) Nebulizer every 6 hours  artificial  tears Solution 1 Drop(s) Both EYES two times a day  aspirin  chewable 81 milliGRAM(s) Enteral Tube every 24 hours  caspofungin IVPB 50 milliGRAM(s) IV Intermittent every 24 hours  chlorhexidine 0.12% Liquid 15 milliLiter(s) Oral Mucosa every 12 hours  chlorhexidine 2% Cloths 1 Application(s) Topical daily  dexMEDEtomidine Infusion 0.7 MICROgram(s)/kG/Hr (12.3 mL/Hr) IV Continuous <Continuous>  dextrose 5%. 1000 milliLiter(s) (100 mL/Hr) IV Continuous <Continuous>  dextrose 50% Injectable 25 Gram(s) IV Push once  fentaNYL   Infusion... 0.2 MICROgram(s)/kG/Hr (0.91 mL/Hr) IV Continuous <Continuous>  glucagon  Injectable 1 milliGRAM(s) IntraMuscular once  heparin   Injectable 5000 Unit(s) SubCutaneous every 8 hours  HYDROmorphone  Injectable 3 milliGRAM(s) IV Push every 4 hours  insulin lispro (ADMELOG) corrective regimen sliding scale   SubCutaneous every 6 hours  ketamine Infusion 0.2 mG/kG/Hr (9.1 mL/Hr) IV Continuous <Continuous>  lacosamide IVPB 250 milliGRAM(s) IV Intermittent every 12 hours  meropenem  IVPB 1000 milliGRAM(s) IV Intermittent every 8 hours  pantoprazole  Injectable 40 milliGRAM(s) IV Push daily  Parenteral Nutrition - Adult 1 Each (46 mL/Hr) TPN Continuous <Continuous>  Parenteral Nutrition - Adult 1 Each (29 mL/Hr) TPN Continuous <Continuous>  sodium chloride 3%  Inhalation 4 milliLiter(s) Inhalation every 6 hours    MEDICATIONS  (PRN):  dextrose Oral Gel 15 Gram(s) Oral once PRN Blood Glucose LESS THAN 70 milliGRAM(s)/deciliter  HYDROmorphone  Injectable 2 milliGRAM(s) IV Push once PRN breakthrough pain  sodium chloride 0.9% lock flush 10 milliLiter(s) IV Push every 1 hour PRN Pre/post blood products, medications, blood draw, and to maintain line patency      Vital Signs Last 24 Hrs  T(C): 37.4 (12 Jun 2023 10:00), Max: 38.1 (12 Jun 2023 05:52)  T(F): 99.4 (12 Jun 2023 10:00), Max: 100.6 (12 Jun 2023 05:52)  HR: 98 (12 Jun 2023 13:00) (86 - 126)  BP: --  BP(mean): --  RR: 12 (12 Jun 2023 13:00) (10 - 29)  SpO2: 100% (12 Jun 2023 13:00) (98% - 100%)    Parameters below as of 12 Jun 2023 13:00  Patient On (Oxygen Delivery Method): ventilator, CPAP    O2 Concentration (%): 40    PHYSICAL EXAM:  T(C): 37.4 (06-12-23 @ 10:00), Max: 38.1 (06-12-23 @ 05:52)  HR: 98 (06-12-23 @ 13:00) (86 - 126)  BP: --  RR: 12 (06-12-23 @ 13:00) (10 - 29)  SpO2: 100% (06-12-23 @ 13:00) (98% - 100%)    CONSTITUTIONAL: no apparent distress. Sedated.   EYES: PERRLA and symmetric, EOMI, No conjunctival or scleral injection, non-icteric  ENMT: Oral mucosa with moist membranes.   RESP: Tracheostomy in place, with minimal dried old blood surrounding it. Connected to CPAP settings on the ventilator, PEEP 8, Pressure support 5, PIP 13. No respiratory distress, no use of accessory muscles; Lungs with some scattered ronchi b/l.   CV: RRR, +S1S2, no MRG  GI: soft, mild ttp around incisions. Midline incision c/d/i.  ; Condom catheter in place with clear, yellow urine.   SKIN: No rashes or ulcers noted.  NEURO: Aroused with speech. RASS 0- -1  PSYCH: Appropriate insight/judgment; A+O x 3, mood and affect appropriate, recent/remote memory intact  Drains: L. hemidiaphragm YANET ss, L. abdominal hematoma drain bilious, L. peripancreatic drain ss (more serous), L. retroperitoneal yanet ss (sang >serous), R. pelvic yanet ss.   LINES: R. IJ TLC, L. subcavian HD cath, R. A-line.         I&O's Detail    11 Jun 2023 07:01  -  12 Jun 2023 07:00  --------------------------------------------------------  IN:    Dexmedetomidine: 381.6 mL    FentaNYL: 43.2 mL    IV PiggyBack: 550 mL    IV PiggyBack: 300 mL    IV PiggyBack: 100 mL    IV PiggyBack: 200 mL    Jevity 1.2: 300 mL    Ketamine: 218.4 mL    TPN (Total Parenteral Nutrition): 468 mL    Vital1.0: 800 mL  Total IN: 3361.2 mL    OUT:    Bulb (mL): 30 mL    Bulb (mL): 50 mL    Bulb (mL): 260 mL    Drain (mL): 15 mL    Drain (mL): 30 mL    Incontinent per Condom Catheter (mL): 2053 mL    Norepinephrine: 0 mL  Total OUT: 2438 mL    Total NET: 923.2 mL      12 Jun 2023 07:01  -  12 Jun 2023 13:55  --------------------------------------------------------  IN:    Dexmedetomidine: 115.9 mL    Dexmedetomidine: 31.9 mL    Enteral Tube Flush: 60 mL    FentaNYL: 5.4 mL    FentaNYL: 0.9 mL    Ketamine: 36.4 mL    TPN (Total Parenteral Nutrition): 116 mL    Vital1.0: 200 mL  Total IN: 566.5 mL    OUT:    Incontinent per Condom Catheter (mL): 200 mL  Total OUT: 200 mL    Total NET: 366.5 mL          LABS:                        7.6    9.05  )-----------( 297      ( 12 Jun 2023 05:50 )             23.8     06-12    141  |  104  |  43<H>  ----------------------------<  110<H>  4.6   |  27  |  0.81    Ca    8.6      12 Jun 2023 05:50  Phos  4.7     06-12  Mg     2.1     06-12    TPro  6.0  /  Alb  2.3<L>  /  TBili  0.7  /  DBili  x   /  AST  16  /  ALT  13  /  AlkPhos  208<H>  06-12          RADIOLOGY & ADDITIONAL STUDIES:

## 2023-06-12 NOTE — PROGRESS NOTE ADULT - ASSESSMENT
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 s/p trach. Course c/b VAP 2/2 Enterobacter and Klebsiella.   - f/u Enterobacter susceptibility  - for now, advise continue meropenem 1g IV q8h (6/10-)  - d/c caspofungin--yeast is considered a colonizer of the respiratory tract

## 2023-06-12 NOTE — PROGRESS NOTE ADULT - ATTENDING COMMENTS
54 yo M complicated hospital course - CAD, TEVAR now c/b hemorrhagic pancreatitis, bacteremia, AHRF now s/p trach after failed extubation, ATN requiring CVVHD and now with renal recovery. Significant improvement over weekend after bronchoscopy confirming LLL purulent pna with + sputum cultures (klebsiella, enterobacter) and empiric antifungal therapy given high risk intra abdominal infection. HR improved, mental status improved, more comfortable with less opioid requirements. Fever curve downtrending. CXR improved. Weaned off levo. Vanc discontinued i/s/o neg MRSA swab, but will continue with meropenem and caspofungin until final culture sensitivities result. Tolerating CPAP, improve volume status and pulmonary clearance prior to trach collar trial (d/c mucomyst given no significant response, c/w hypertonic saline, frequent suctioning, chair position, PT/OT, mobilization. Extensive dependent edema, plan to increase lasix to 60mg IV, spot doses for goal net neg 1-2L. Continue to adv parenteral TF rate to goal, if continues to tolerate can decrease TPN. D/C fentanyl gtt and c/w PRN dilaudid pushes, precedex for RASS 0 to -1. D/C HD cath, will need new central access if unable to be entirely on NGT feeds by tomorrow.       Direct personal management at bed side and extensive interpretation of the data.  Plan was outlined and discussed in details with the housestaff.  Decision making of highest complexity  Action taken for acute disease activity to reflect the level of care provided:  - medication reconciliation  - review laboratory data  - review radiographic data

## 2023-06-12 NOTE — PROGRESS NOTE ADULT - ASSESSMENT
53 year old male with PMH of HTN, Type A Aortic dissection s/p Dacron grafts, AV resuspension (2013), CAD s/p CABG x 1 SVG to RCA (2013, Dr. Medina), seizure disorder, with recent admit (Mar/Apr) for replacement of transverse aortic arch, second stage TEVAR and aoto-axillary bypass, AV replacement, and CABG x1. Post op course c/b shock, TREY requiring CVVHD, dialysis. Readmitted on 4/27 ([facility] ED) for syncope at home where imaging revealed graft endoleak and pancreatitis. Patient taken back to OR 5/5 for TEVAR, with post op course now complicated by Klebseilla bacteremia (5/15), respiratory failure requiring intubation (5/18), and bronch and PEA arrest (5/19), with respiratory cultures revealing Enterobacter, E. Faecalis and Strep angionosus. Surgery reconsulted re: active hemorrhagic pancreatitis on CT (5/13), with subsequent IR aspiration of peripancreatic fluid collection (15cc, Kleb) and paracentesis - 4L (5/22). Patient started on CVVHD 5/22. Transferred to SICU (5/25), with catheter exchange and two additional drains placed on 5/26 in IR and exlap, wash out and new drain placement on 5/31. The patient returned to the OR 6/1 for evacuation of blood and placement of J tube. Patient failed extubation on 6/3 and received trach on 6/5. Noted to be febrile overnight 6/7, 6/8, 6/9. CT Ch/Ab/Pelvis overnight 6/7 with stable abdominal collections and atelectasis of LLL.     Plan:    -F/u culture sensitivites  -F/u ID recs  -Continue flushing drains, please start flushing bilious one  -Wean off sedation  -CPAP as clinically feasible  -Continue tube feeds  -Rest of care per SICU  -Surgery Team 1 will continue to follow. Please page Team 1 with questions/clinical changes. [phone]

## 2023-06-12 NOTE — PROGRESS NOTE ADULT - ASSESSMENT
53yMale w/ PMHx of HTN, type A aortic dissection s/p Dacron grafts, AV resuspension in 2013, CAD s/p CABG x 1 SVG to RCA (5/2013 with Dr. Medina), seizure disorder, recent admission 3/20-4/14 for replacement of transverse aortic arch, second stage TEVAR and aorto-axillary bypass, AV replacement (bio 23mm), and CABG x 1 (SVG-RCA). Pt found to have endo leak and taken to OR for TEVAR revision on 5/5, Post op course c/b Klebsiella bacteremia (5/15), resp failure requiring intubation on 5/18, Mucus plugging s/p Bronch 5/19 and PEA arrest. Post operatively pt also found to have hemorrhagic pancreatitis with venu-pancreatic abscess possibly 2* to Depakote therefore s/p IR aspiration of peripancreatic fluid collection and paracentesis on 5/22, IR venu-pancreatic abscess drainage and placement of drainage catheter on 5/24. Pt then transferred from CTICU to SICU for further mgmt of hemorrhagic pancreatitis on 5/25. s/p IR placement of 2 new drainage catheters and catheter exchange on 5/26. LUQ drainage 5/30. OR 5/31 exlap washout & replacement of 3 new JPs and abthera vac with open abdomen. RTOR 6/1, no bleeding, evac of old blood, placement of feeding J-tube. failed extubation s/p trach 6/5    NEURO: Pain control with standing Dilaudid 3q4h w/ additional PRN dilaudid, wean fent gtts (now on 0.2), Precedex gtt, ketamine at 0.2. Hx seizures: epilepsy recs appreciated, Cont vimpat. Depakote weaned off due to concerns for drug-related hemorrhagic pancreatitis. Repeat EEG (ordered)  HEENT: Artificial tears, Torticollis - PT/OT following   CV: MAP > 65 on levophed gtt (likely sedation). s/p PEA arrest on 5/24 night. Echo from 5/19 hyperdynamic LV, SHAJI with LVOTO (gradient 26), normal RV, mild MR, no pulm HTN. C/w ASA 81mg.  PULM: ARDS resolved - off NO, s/p multiple Mucus plug/ Lung collapse s/p bronch x3 (most recently on 5/26) most likely from outward obstruction: Cont rotating pt around the clock. Cont Duonebs, 3% saline. failed extubation 6/3: s/p Trach 6/5: AC 40/500/22/8 with daily CPAP trial,   GI/FEN: TF vital 1.0 @50 via feeding J-tube, supplemental TPN, no lipids needed while on higher TF rate. Protonix BID, Hemorrhagic pancreatitis: s/p multiple drain placements and paracentisis by IR team.   : AKF on HD s/p CVVHD, now w/ renal recovery, off of aquaphoresis for fluid overload. condom catheter. Lasix 60mg spot dosing.  HEME: Acute blood loss anemia requiring multiple transfusions  ENDO: mISS  ID: ID consulted for abx management. Start Added caspofungin yesterday (6/9-) and restarted shin yesterday (6/9--), vanco x 1 (6/9), previous Kleb bacteremia from 5/15 & 5/17, subsequent bld cx negative, PNA w/ bronch cx kleb, enterobacter (zosyn, erta resistant), E faecalis, strep angionosus from 5/19, pancreatic abscess cx pan-sensitive kleb 5/22. completed Ceftriaxone( 6/5- 6/15) Shin (5/21-6/5),  Caspo (5/23-5/30), Ampicillin (5/21- 5/27).   PPX: SCDs, SQH  LINES: L rad kalpana (5/31--), Lsubclav HD Cath (5/31--), RIJ TLC (6/1--) // DC: RIJ TLC (5/22-5/27), RIJ HD cath (5/22-31), R Ax kalpana(5/12-5/31) LIJ TLC (5/23-6/1)   WOUNDS/DRAINS: right OR CODIE, 2 left OR CODIE, left IR hematoma drain (bilious), left IR pancreatic drain  PT: PT/OT ordered 6/5. Out of bed to stretcher chair  Dispo: SICU

## 2023-06-13 LAB
-  AMPICILLIN/SULBACTAM: SIGNIFICANT CHANGE UP
-  AMPICILLIN: SIGNIFICANT CHANGE UP
-  CEFAZOLIN: SIGNIFICANT CHANGE UP
-  CEFEPIME: SIGNIFICANT CHANGE UP
-  CEFTRIAXONE: SIGNIFICANT CHANGE UP
-  CIPROFLOXACIN: SIGNIFICANT CHANGE UP
-  ERTAPENEM: SIGNIFICANT CHANGE UP
-  GENTAMICIN: SIGNIFICANT CHANGE UP
-  MEROPENEM: SIGNIFICANT CHANGE UP
-  PIPERACILLIN/TAZOBACTAM: SIGNIFICANT CHANGE UP
-  TOBRAMYCIN: SIGNIFICANT CHANGE UP
-  TRIMETHOPRIM/SULFAMETHOXAZOLE: SIGNIFICANT CHANGE UP
ALBUMIN SERPL ELPH-MCNC: 2.1 G/DL — LOW (ref 3.3–5)
ALP SERPL-CCNC: 162 U/L — HIGH (ref 40–120)
ALT FLD-CCNC: 11 U/L — SIGNIFICANT CHANGE UP (ref 10–45)
ANION GAP SERPL CALC-SCNC: 11 MMOL/L — SIGNIFICANT CHANGE UP (ref 5–17)
ANISOCYTOSIS BLD QL: SLIGHT — SIGNIFICANT CHANGE UP
AST SERPL-CCNC: 15 U/L — SIGNIFICANT CHANGE UP (ref 10–40)
BASE EXCESS BLDA CALC-SCNC: 4.7 MMOL/L — HIGH (ref -2–3)
BASOPHILS # BLD AUTO: 0 K/UL — SIGNIFICANT CHANGE UP (ref 0–0.2)
BASOPHILS NFR BLD AUTO: 0 % — SIGNIFICANT CHANGE UP (ref 0–2)
BILIRUB SERPL-MCNC: 0.6 MG/DL — SIGNIFICANT CHANGE UP (ref 0.2–1.2)
BUN SERPL-MCNC: 39 MG/DL — HIGH (ref 7–23)
CALCIUM SERPL-MCNC: 8.2 MG/DL — LOW (ref 8.4–10.5)
CHLORIDE SERPL-SCNC: 103 MMOL/L — SIGNIFICANT CHANGE UP (ref 96–108)
CO2 BLDA-SCNC: 30 MMOL/L — HIGH (ref 19–24)
CO2 SERPL-SCNC: 25 MMOL/L — SIGNIFICANT CHANGE UP (ref 22–31)
CREAT SERPL-MCNC: 0.77 MG/DL — SIGNIFICANT CHANGE UP (ref 0.5–1.3)
CULTURE RESULTS: SIGNIFICANT CHANGE UP
EGFR: 106 ML/MIN/1.73M2 — SIGNIFICANT CHANGE UP
EOSINOPHIL # BLD AUTO: 0.82 K/UL — HIGH (ref 0–0.5)
EOSINOPHIL NFR BLD AUTO: 7.9 % — HIGH (ref 0–6)
GAS PNL BLDA: SIGNIFICANT CHANGE UP
GIANT PLATELETS BLD QL SMEAR: PRESENT — SIGNIFICANT CHANGE UP
GLUCOSE BLDC GLUCOMTR-MCNC: 103 MG/DL — HIGH (ref 70–99)
GLUCOSE BLDC GLUCOMTR-MCNC: 107 MG/DL — HIGH (ref 70–99)
GLUCOSE BLDC GLUCOMTR-MCNC: 117 MG/DL — HIGH (ref 70–99)
GLUCOSE BLDC GLUCOMTR-MCNC: 121 MG/DL — HIGH (ref 70–99)
GLUCOSE SERPL-MCNC: 118 MG/DL — HIGH (ref 70–99)
HCO3 BLDA-SCNC: 29 MMOL/L — HIGH (ref 21–28)
HCT VFR BLD CALC: 22.9 % — LOW (ref 39–50)
HGB BLD-MCNC: 7.1 G/DL — LOW (ref 13–17)
HYPOCHROMIA BLD QL: SLIGHT — SIGNIFICANT CHANGE UP
LYMPHOCYTES # BLD AUTO: 0.36 K/UL — LOW (ref 1–3.3)
LYMPHOCYTES # BLD AUTO: 3.5 % — LOW (ref 13–44)
MACROCYTES BLD QL: SLIGHT — SIGNIFICANT CHANGE UP
MAGNESIUM SERPL-MCNC: 2 MG/DL — SIGNIFICANT CHANGE UP (ref 1.6–2.6)
MANUAL SMEAR VERIFICATION: SIGNIFICANT CHANGE UP
MCHC RBC-ENTMCNC: 29 PG — SIGNIFICANT CHANGE UP (ref 27–34)
MCHC RBC-ENTMCNC: 31 GM/DL — LOW (ref 32–36)
MCV RBC AUTO: 93.5 FL — SIGNIFICANT CHANGE UP (ref 80–100)
METAMYELOCYTES # FLD: 0.9 % — HIGH (ref 0–0)
METHOD TYPE: SIGNIFICANT CHANGE UP
METHOD TYPE: SIGNIFICANT CHANGE UP
MICROCYTES BLD QL: SLIGHT — SIGNIFICANT CHANGE UP
MONOCYTES # BLD AUTO: 0.55 K/UL — SIGNIFICANT CHANGE UP (ref 0–0.9)
MONOCYTES NFR BLD AUTO: 5.3 % — SIGNIFICANT CHANGE UP (ref 2–14)
NEUTROPHILS # BLD AUTO: 8.54 K/UL — HIGH (ref 1.8–7.4)
NEUTROPHILS NFR BLD AUTO: 82.4 % — HIGH (ref 43–77)
NRBC # BLD: 1 /100 — HIGH (ref 0–0)
NRBC # BLD: SIGNIFICANT CHANGE UP /100 WBCS (ref 0–0)
ORGANISM # SPEC MICROSCOPIC CNT: SIGNIFICANT CHANGE UP
OVALOCYTES BLD QL SMEAR: SLIGHT — SIGNIFICANT CHANGE UP
PCO2 BLDA: 42 MMHG — SIGNIFICANT CHANGE UP (ref 35–48)
PH BLDA: 7.45 — SIGNIFICANT CHANGE UP (ref 7.35–7.45)
PHOSPHATE SERPL-MCNC: 4.3 MG/DL — SIGNIFICANT CHANGE UP (ref 2.5–4.5)
PLAT MORPH BLD: ABNORMAL
PLATELET # BLD AUTO: 351 K/UL — SIGNIFICANT CHANGE UP (ref 150–400)
PO2 BLDA: 170 MMHG — HIGH (ref 83–108)
POIKILOCYTOSIS BLD QL AUTO: SLIGHT — SIGNIFICANT CHANGE UP
POLYCHROMASIA BLD QL SMEAR: SLIGHT — SIGNIFICANT CHANGE UP
POTASSIUM SERPL-MCNC: 4.1 MMOL/L — SIGNIFICANT CHANGE UP (ref 3.5–5.3)
POTASSIUM SERPL-SCNC: 4.1 MMOL/L — SIGNIFICANT CHANGE UP (ref 3.5–5.3)
PROT SERPL-MCNC: 6.3 G/DL — SIGNIFICANT CHANGE UP (ref 6–8.3)
RBC # BLD: 2.45 M/UL — LOW (ref 4.2–5.8)
RBC # FLD: 15.9 % — HIGH (ref 10.3–14.5)
RBC BLD AUTO: ABNORMAL
SAO2 % BLDA: 99.7 % — HIGH (ref 94–98)
SCHISTOCYTES BLD QL AUTO: SLIGHT — SIGNIFICANT CHANGE UP
SMUDGE CELLS # BLD: PRESENT — SIGNIFICANT CHANGE UP
SODIUM SERPL-SCNC: 139 MMOL/L — SIGNIFICANT CHANGE UP (ref 135–145)
SPECIMEN SOURCE: SIGNIFICANT CHANGE UP
SPHEROCYTES BLD QL SMEAR: SLIGHT — SIGNIFICANT CHANGE UP
WBC # BLD: 10.36 K/UL — SIGNIFICANT CHANGE UP (ref 3.8–10.5)
WBC # FLD AUTO: 10.36 K/UL — SIGNIFICANT CHANGE UP (ref 3.8–10.5)

## 2023-06-13 PROCEDURE — 36000 PLACE NEEDLE IN VEIN: CPT

## 2023-06-13 PROCEDURE — 99232 SBSQ HOSP IP/OBS MODERATE 35: CPT

## 2023-06-13 PROCEDURE — 99291 CRITICAL CARE FIRST HOUR: CPT | Mod: GC

## 2023-06-13 PROCEDURE — 71045 X-RAY EXAM CHEST 1 VIEW: CPT | Mod: 26

## 2023-06-13 RX ORDER — DEXMEDETOMIDINE HYDROCHLORIDE IN 0.9% SODIUM CHLORIDE 4 UG/ML
0.02 INJECTION INTRAVENOUS
Qty: 400 | Refills: 0 | Status: DISCONTINUED | OUTPATIENT
Start: 2023-06-13 | End: 2023-06-13

## 2023-06-13 RX ORDER — FENTANYL CITRATE 50 UG/ML
25 INJECTION INTRAVENOUS ONCE
Refills: 0 | Status: DISCONTINUED | OUTPATIENT
Start: 2023-06-13 | End: 2023-06-13

## 2023-06-13 RX ORDER — HYDROMORPHONE HYDROCHLORIDE 2 MG/ML
1 INJECTION INTRAMUSCULAR; INTRAVENOUS; SUBCUTANEOUS ONCE
Refills: 0 | Status: DISCONTINUED | OUTPATIENT
Start: 2023-06-13 | End: 2023-06-13

## 2023-06-13 RX ORDER — FUROSEMIDE 40 MG
80 TABLET ORAL ONCE
Refills: 0 | Status: COMPLETED | OUTPATIENT
Start: 2023-06-13 | End: 2023-06-13

## 2023-06-13 RX ORDER — ERTAPENEM SODIUM 1 G/1
1000 INJECTION, POWDER, LYOPHILIZED, FOR SOLUTION INTRAMUSCULAR; INTRAVENOUS EVERY 24 HOURS
Refills: 0 | Status: COMPLETED | OUTPATIENT
Start: 2023-06-13 | End: 2023-06-15

## 2023-06-13 RX ORDER — PSYLLIUM SEED (WITH DEXTROSE)
1 POWDER (GRAM) ORAL DAILY
Refills: 0 | Status: DISCONTINUED | OUTPATIENT
Start: 2023-06-13 | End: 2023-06-14

## 2023-06-13 RX ORDER — DEXMEDETOMIDINE HYDROCHLORIDE IN 0.9% SODIUM CHLORIDE 4 UG/ML
0.2 INJECTION INTRAVENOUS
Qty: 400 | Refills: 0 | Status: DISCONTINUED | OUTPATIENT
Start: 2023-06-13 | End: 2023-06-14

## 2023-06-13 RX ADMIN — Medication 3 MILLILITER(S): at 21:12

## 2023-06-13 RX ADMIN — KETAMINE HYDROCHLORIDE 9.1 MG/KG/HR: 100 INJECTION INTRAMUSCULAR; INTRAVENOUS at 06:30

## 2023-06-13 RX ADMIN — Medication 2.5 MILLIGRAM(S): at 11:07

## 2023-06-13 RX ADMIN — Medication 975 MILLIGRAM(S): at 22:00

## 2023-06-13 RX ADMIN — SODIUM CHLORIDE 4 MILLILITER(S): 9 INJECTION INTRAMUSCULAR; INTRAVENOUS; SUBCUTANEOUS at 21:12

## 2023-06-13 RX ADMIN — LACOSAMIDE 150 MILLIGRAM(S): 50 TABLET ORAL at 06:30

## 2023-06-13 RX ADMIN — HYDROMORPHONE HYDROCHLORIDE 3 MILLIGRAM(S): 2 INJECTION INTRAMUSCULAR; INTRAVENOUS; SUBCUTANEOUS at 17:48

## 2023-06-13 RX ADMIN — HYDROMORPHONE HYDROCHLORIDE 1 MILLIGRAM(S): 2 INJECTION INTRAMUSCULAR; INTRAVENOUS; SUBCUTANEOUS at 19:47

## 2023-06-13 RX ADMIN — HYDROMORPHONE HYDROCHLORIDE 1 MILLIGRAM(S): 2 INJECTION INTRAMUSCULAR; INTRAVENOUS; SUBCUTANEOUS at 00:32

## 2023-06-13 RX ADMIN — Medication 975 MILLIGRAM(S): at 16:53

## 2023-06-13 RX ADMIN — CHLORHEXIDINE GLUCONATE 15 MILLILITER(S): 213 SOLUTION TOPICAL at 17:48

## 2023-06-13 RX ADMIN — HEPARIN SODIUM 5000 UNIT(S): 5000 INJECTION INTRAVENOUS; SUBCUTANEOUS at 22:00

## 2023-06-13 RX ADMIN — PANTOPRAZOLE SODIUM 40 MILLIGRAM(S): 20 TABLET, DELAYED RELEASE ORAL at 11:07

## 2023-06-13 RX ADMIN — Medication 975 MILLIGRAM(S): at 15:53

## 2023-06-13 RX ADMIN — HYDROMORPHONE HYDROCHLORIDE 3 MILLIGRAM(S): 2 INJECTION INTRAMUSCULAR; INTRAVENOUS; SUBCUTANEOUS at 02:47

## 2023-06-13 RX ADMIN — SODIUM CHLORIDE 4 MILLILITER(S): 9 INJECTION INTRAMUSCULAR; INTRAVENOUS; SUBCUTANEOUS at 15:53

## 2023-06-13 RX ADMIN — HEPARIN SODIUM 5000 UNIT(S): 5000 INJECTION INTRAVENOUS; SUBCUTANEOUS at 05:44

## 2023-06-13 RX ADMIN — MEROPENEM 100 MILLIGRAM(S): 1 INJECTION INTRAVENOUS at 13:19

## 2023-06-13 RX ADMIN — Medication 975 MILLIGRAM(S): at 06:10

## 2023-06-13 RX ADMIN — Medication 975 MILLIGRAM(S): at 09:05

## 2023-06-13 RX ADMIN — Medication 975 MILLIGRAM(S): at 05:46

## 2023-06-13 RX ADMIN — Medication 81 MILLIGRAM(S): at 13:19

## 2023-06-13 RX ADMIN — ERTAPENEM SODIUM 120 MILLIGRAM(S): 1 INJECTION, POWDER, LYOPHILIZED, FOR SOLUTION INTRAMUSCULAR; INTRAVENOUS at 20:35

## 2023-06-13 RX ADMIN — HYDROMORPHONE HYDROCHLORIDE 3 MILLIGRAM(S): 2 INJECTION INTRAMUSCULAR; INTRAVENOUS; SUBCUTANEOUS at 06:04

## 2023-06-13 RX ADMIN — HYDROMORPHONE HYDROCHLORIDE 1 MILLIGRAM(S): 2 INJECTION INTRAMUSCULAR; INTRAVENOUS; SUBCUTANEOUS at 10:19

## 2023-06-13 RX ADMIN — LACOSAMIDE 150 MILLIGRAM(S): 50 TABLET ORAL at 18:19

## 2023-06-13 RX ADMIN — HYDROMORPHONE HYDROCHLORIDE 3 MILLIGRAM(S): 2 INJECTION INTRAMUSCULAR; INTRAVENOUS; SUBCUTANEOUS at 03:00

## 2023-06-13 RX ADMIN — CHLORHEXIDINE GLUCONATE 15 MILLILITER(S): 213 SOLUTION TOPICAL at 05:44

## 2023-06-13 RX ADMIN — HYDROMORPHONE HYDROCHLORIDE 3 MILLIGRAM(S): 2 INJECTION INTRAMUSCULAR; INTRAVENOUS; SUBCUTANEOUS at 09:06

## 2023-06-13 RX ADMIN — Medication 1 DROP(S): at 17:50

## 2023-06-13 RX ADMIN — HYDROMORPHONE HYDROCHLORIDE 3 MILLIGRAM(S): 2 INJECTION INTRAMUSCULAR; INTRAVENOUS; SUBCUTANEOUS at 09:20

## 2023-06-13 RX ADMIN — HYDROMORPHONE HYDROCHLORIDE 3 MILLIGRAM(S): 2 INJECTION INTRAMUSCULAR; INTRAVENOUS; SUBCUTANEOUS at 13:34

## 2023-06-13 RX ADMIN — HYDROMORPHONE HYDROCHLORIDE 3 MILLIGRAM(S): 2 INJECTION INTRAMUSCULAR; INTRAVENOUS; SUBCUTANEOUS at 18:03

## 2023-06-13 RX ADMIN — Medication 975 MILLIGRAM(S): at 09:20

## 2023-06-13 RX ADMIN — Medication 40 MILLIGRAM(S): at 00:24

## 2023-06-13 RX ADMIN — Medication 80 MILLIGRAM(S): at 11:07

## 2023-06-13 RX ADMIN — FENTANYL CITRATE 25 MICROGRAM(S): 50 INJECTION INTRAVENOUS at 23:47

## 2023-06-13 RX ADMIN — Medication 3 MILLILITER(S): at 09:04

## 2023-06-13 RX ADMIN — DEXMEDETOMIDINE HYDROCHLORIDE IN 0.9% SODIUM CHLORIDE 12.3 MICROGRAM(S)/KG/HR: 4 INJECTION INTRAVENOUS at 15:55

## 2023-06-13 RX ADMIN — HYDROMORPHONE HYDROCHLORIDE 1 MILLIGRAM(S): 2 INJECTION INTRAMUSCULAR; INTRAVENOUS; SUBCUTANEOUS at 23:32

## 2023-06-13 RX ADMIN — Medication 3 MILLILITER(S): at 05:56

## 2023-06-13 RX ADMIN — Medication 1 DROP(S): at 06:40

## 2023-06-13 RX ADMIN — HEPARIN SODIUM 5000 UNIT(S): 5000 INJECTION INTRAVENOUS; SUBCUTANEOUS at 13:19

## 2023-06-13 RX ADMIN — Medication 1 PACKET(S): at 15:53

## 2023-06-13 RX ADMIN — HYDROMORPHONE HYDROCHLORIDE 1 MILLIGRAM(S): 2 INJECTION INTRAMUSCULAR; INTRAVENOUS; SUBCUTANEOUS at 17:47

## 2023-06-13 RX ADMIN — Medication 2.5 MILLIGRAM(S): at 05:45

## 2023-06-13 RX ADMIN — SODIUM CHLORIDE 4 MILLILITER(S): 9 INJECTION INTRAMUSCULAR; INTRAVENOUS; SUBCUTANEOUS at 05:56

## 2023-06-13 RX ADMIN — HYDROMORPHONE HYDROCHLORIDE 3 MILLIGRAM(S): 2 INJECTION INTRAMUSCULAR; INTRAVENOUS; SUBCUTANEOUS at 06:18

## 2023-06-13 RX ADMIN — HYDROMORPHONE HYDROCHLORIDE 3 MILLIGRAM(S): 2 INJECTION INTRAMUSCULAR; INTRAVENOUS; SUBCUTANEOUS at 13:19

## 2023-06-13 RX ADMIN — MEROPENEM 100 MILLIGRAM(S): 1 INJECTION INTRAVENOUS at 05:44

## 2023-06-13 RX ADMIN — Medication 975 MILLIGRAM(S): at 21:17

## 2023-06-13 RX ADMIN — HYDROMORPHONE HYDROCHLORIDE 3 MILLIGRAM(S): 2 INJECTION INTRAMUSCULAR; INTRAVENOUS; SUBCUTANEOUS at 21:25

## 2023-06-13 RX ADMIN — SODIUM CHLORIDE 4 MILLILITER(S): 9 INJECTION INTRAMUSCULAR; INTRAVENOUS; SUBCUTANEOUS at 09:04

## 2023-06-13 RX ADMIN — CHLORHEXIDINE GLUCONATE 1 APPLICATION(S): 213 SOLUTION TOPICAL at 12:26

## 2023-06-13 RX ADMIN — HYDROMORPHONE HYDROCHLORIDE 1 MILLIGRAM(S): 2 INJECTION INTRAMUSCULAR; INTRAVENOUS; SUBCUTANEOUS at 17:32

## 2023-06-13 RX ADMIN — HYDROMORPHONE HYDROCHLORIDE 3 MILLIGRAM(S): 2 INJECTION INTRAMUSCULAR; INTRAVENOUS; SUBCUTANEOUS at 21:40

## 2023-06-13 RX ADMIN — HYDROMORPHONE HYDROCHLORIDE 1 MILLIGRAM(S): 2 INJECTION INTRAMUSCULAR; INTRAVENOUS; SUBCUTANEOUS at 10:34

## 2023-06-13 RX ADMIN — HYDROMORPHONE HYDROCHLORIDE 1 MILLIGRAM(S): 2 INJECTION INTRAMUSCULAR; INTRAVENOUS; SUBCUTANEOUS at 23:19

## 2023-06-13 RX ADMIN — Medication 3 MILLILITER(S): at 15:53

## 2023-06-13 RX ADMIN — HYDROMORPHONE HYDROCHLORIDE 1 MILLIGRAM(S): 2 INJECTION INTRAMUSCULAR; INTRAVENOUS; SUBCUTANEOUS at 00:45

## 2023-06-13 NOTE — PROGRESS NOTE ADULT - SUBJECTIVE AND OBJECTIVE BOX
1. 16 Fr teal LUQ peripancreatic abscess "Left Pancreatic CODIE Drain" placed on 5/24 (attached to gravity bag):   -40 cc serosanguinous output in 24 hrs. 15 cc in previous 24 hrs. Daily irrigation with 250 cc NS per primary team.     2. 16 Fr teal LUQ hematoma "Left Abdominal Hematoma CODIE Drain" placed on 5/25 and upsized on 5/26:   -15 cc dark green-brown (likely hemolyzing blood products) output in 24 hrs. 30 cc in previous 24 hrs. Flushed easily with 3cc sterile NS.

## 2023-06-13 NOTE — PROGRESS NOTE ADULT - ASSESSMENT
53yMale w/ PMHx of HTN, type A aortic dissection s/p Dacron grafts, AV resuspension in 2013, CAD s/p CABG x 1 SVG to RCA (5/2013 with Dr. Medina), seizure disorder, recent admission 3/20-4/14 for replacement of transverse aortic arch, second stage TEVAR and aorto-axillary bypass, AV replacement (bio 23mm), and CABG x 1 (SVG-RCA). Pt found to have endo leak and taken to OR for TEVAR revision on 5/5, Post op course c/b Klebsiella bacteremia (5/15), resp failure requiring intubation on 5/18, Mucus plugging s/p Bronch 5/19 and PEA arrest. Post operatively pt also found to have hemorrhagic pancreatitis with venu-pancreatic abscess possibly 2* to Depakote therefore s/p IR aspiration of peripancreatic fluid collection and paracentesis on 5/22, IR venu-pancreatic abscess drainage and placement of drainage catheter on 5/24. Pt then transferred from CTICU to SICU for further mgmt of hemorrhagic pancreatitis on 5/25. s/p IR placement of 2 new drainage catheters and catheter exchange on 5/26. LUQ drainage 5/30. OR 5/31 exlap washout & replacement of 3 new JPs and abthera vac with open abdomen. RTOR 6/1, no bleeding, evac of old blood, placement of feeding J-tube. failed extubation s/p trach 6/5    NEURO: Pain control with standing Dilaudid 3q4h w/ additional PRN dilaudid, off of fent gtt, Precedex gtt 0.4, ketamine at 0.2. Hx seizures: epilepsy recs appreciated, Cont vimpat. Depakote weaned off due to concerns for drug-related hemorrhagic pancreatitis. Repeat EEG (ordered)  HEENT: Artificial tears, Torticollis - PT/OT following   CV: MAP > 65. s/p PEA arrest on 5/24 night. Echo from 5/19 hyperdynamic LV, SHAJI with LVOTO (gradient 26), normal RV, mild MR, no pulm HTN. C/w ASA 81mg.  PULM: ARDS resolved - off NO, s/p multiple Mucus plug/ Lung collapse s/p bronch x3 (most recently on 5/26) most likely from outward obstruction: Cont rotating pt around the clock. Cont Duonebs, 3% saline. failed extubation 6/3: s/p Trach 6/5: AC 40/500/22/8 with daily CPAP trial,   GI/FEN: TF vital 1.5 @50 via feeding J-tube, supplemental TPN, no lipids needed while on higher TF rate. Protonix BID, Hemorrhagic pancreatitis: s/p multiple drain placements and paracentisis by IR team.   : AKF on HD s/p CVVHD, now w/ renal recovery, off of aquaphoresis for fluid overload. condom catheter. Lasix spot dosing.  HEME: Acute blood loss anemia requiring multiple transfusions  ENDO: mISS  ID: meropenem (6/9-) // vanco x 1 (6/9), caspofungin (6/9-6/12), previous Kleb bacteremia from 5/15 & 5/17, subsequent bld cx negative, PNA w/ bronch cx kleb, enterobacter (zosyn, erta resistant), E faecalis, strep angionosus from 5/19, pancreatic abscess cx pan-sensitive kleb 5/22. completed Ceftriaxone( 6/5- 6/15) Chloe (5/21-6/5),  Caspo (5/23-5/30), Ampicillin (5/21- 5/27).   PPX: SCDs, SQH  LINES: L rad kalpana (5/31--), RIJ TLC (6/1--) // DC: Lsubclav HD Cath (5/31-6/12), RIJ TLC (5/22-5/27), RIJ HD cath (5/22-31), R Ax kalpana(5/12-5/31) LIJ TLC (5/23-6/1)   WOUNDS/DRAINS: right OR CODIE, 2 left OR CODIE, left IR hematoma drain (bilious), left IR pancreatic drain  PT: PT/OT ordered 6/5. Out of bed to chair today.   Dispo: SICU   53yMale w/ PMHx of HTN, type A aortic dissection s/p Dacron grafts, AV resuspension in 2013, CAD s/p CABG x 1 SVG to RCA (5/2013 with Dr. Medina), seizure disorder, recent admission 3/20-4/14 for replacement of transverse aortic arch, second stage TEVAR and aorto-axillary bypass, AV replacement (bio 23mm), and CABG x 1 (SVG-RCA). Pt found to have endo leak and taken to OR for TEVAR revision on 5/5, Post op course c/b Klebsiella bacteremia (5/15), resp failure requiring intubation on 5/18, Mucus plugging s/p Bronch 5/19 and PEA arrest. Post operatively pt also found to have hemorrhagic pancreatitis with venu-pancreatic abscess possibly 2* to Depakote therefore s/p IR aspiration of peripancreatic fluid collection and paracentesis on 5/22, IR venu-pancreatic abscess drainage and placement of drainage catheter on 5/24. Pt then transferred from CTICU to SICU for further mgmt of hemorrhagic pancreatitis on 5/25. s/p IR placement of 2 new drainage catheters and catheter exchange on 5/26. LUQ drainage 5/30. OR 5/31 exlap washout & replacement of 3 new JPs and abthera vac with open abdomen. RTOR 6/1, no bleeding, evac of old blood, placement of feeding J-tube. failed extubation s/p trach 6/5    NEURO: Pain control with standing Dilaudid 3q4h w/ additional PRN dilaudid, off of fent gtt, Precedex gtt 0.4, ketamine at 0.2. Hx seizures: epilepsy recs appreciated, Cont vimpat. Depakote weaned off due to concerns for drug-related hemorrhagic pancreatitis. Repeat EEG (ordered)  HEENT: Artificial tears, Torticollis - PT/OT following   CV: MAP > 65. s/p PEA arrest on 5/24 night. Echo from 5/19 hyperdynamic LV, SHAJI with LVOTO (gradient 26), normal RV, mild MR, no pulm HTN. C/w ASA 81mg.  PULM: ARDS resolved - off NO, s/p multiple Mucus plug/ Lung collapse s/p bronch x3 (most recently on 5/26) most likely from outward obstruction: Cont rotating pt around the clock. Cont Duonebs, 3% saline. failed extubation 6/3: s/p Trach 6/5: AC 40/500/22/8 with daily CPAP trial, will attempt trach collar today vs tomorrow.   GI/FEN: TF vital 1.5 @50 via feeding J-tube, supplemental TPN, no lipids needed while on higher TF rate. Protonix BID, Hemorrhagic pancreatitis: s/p multiple drain placements and paracentisis by IR team.   : AKF on HD s/p CVVHD, now w/ renal recovery, off of aquaphoresis for fluid overload. condom catheter. Lasix spot dosing.  HEME: Acute blood loss anemia requiring multiple transfusions  ENDO: mISS  ID: meropenem (6/9-) // vanco x 1 (6/9), caspofungin (6/9-6/12), previous Kleb bacteremia from 5/15 & 5/17, subsequent bld cx negative, PNA w/ bronch cx kleb, enterobacter (zosyn, erta resistant), E faecalis, strep angionosus from 5/19, pancreatic abscess cx pan-sensitive kleb 5/22. completed Ceftriaxone( 6/5- 6/15) Chloe (5/21-6/5),  Caspo (5/23-5/30), Ampicillin (5/21- 5/27).   PPX: SCDs, SQH  LINES: L rad kalpana (5/31--), RIJ TLC (6/1--) // DC: Lsubclav HD Cath (5/31-6/12), RIJ TLC (5/22-5/27), RIJ HD cath (5/22-31), R Ax kalpana(5/12-5/31) LIJ TLC (5/23-6/1)   WOUNDS/DRAINS: right OR CODIE, 2 left OR CODIE, left IR hematoma drain (bilious), left IR pancreatic drain  PT: PT/OT ordered 6/5. Out of bed to chair today.   Dispo: SICU

## 2023-06-13 NOTE — PROGRESS NOTE ADULT - SUBJECTIVE AND OBJECTIVE BOX
INTERVAL HPI/OVERNIGHT EVENTS: SABRINA.    CONSTITUTIONAL:  Negative fever or chills, feels well, good appetite  EYES:  Negative  blurry vision or double vision  CARDIOVASCULAR:  Negative for chest pain or palpitations  RESPIRATORY:  Negative for cough, wheezing, or SOB   GASTROINTESTINAL:  Negative for nausea, vomiting, diarrhea, constipation, or abdominal pain  GENITOURINARY:  Negative frequency, urgency or dysuria  NEUROLOGIC:  No headache, confusion, dizziness, lightheadedness      ANTIBIOTICS/RELEVANT:    MEDICATIONS  (STANDING):  acetaminophen   Oral Liquid .. 975 milliGRAM(s) Enteral Tube every 6 hours  albuterol/ipratropium for Nebulization 3 milliLiter(s) Nebulizer every 6 hours  artificial  tears Solution 1 Drop(s) Both EYES two times a day  aspirin  chewable 81 milliGRAM(s) Enteral Tube every 24 hours  chlorhexidine 0.12% Liquid 15 milliLiter(s) Oral Mucosa every 12 hours  chlorhexidine 2% Cloths 1 Application(s) Topical daily  dexMEDEtomidine Infusion 0.7 MICROgram(s)/kG/Hr (12.3 mL/Hr) IV Continuous <Continuous>  dextrose 5%. 1000 milliLiter(s) (100 mL/Hr) IV Continuous <Continuous>  dextrose 50% Injectable 25 Gram(s) IV Push once  glucagon  Injectable 1 milliGRAM(s) IntraMuscular once  heparin   Injectable 5000 Unit(s) SubCutaneous every 8 hours  HYDROmorphone  Injectable 3 milliGRAM(s) IV Push every 4 hours  insulin lispro (ADMELOG) corrective regimen sliding scale   SubCutaneous every 6 hours  ketamine Infusion 0.15 mG/kG/Hr (6.83 mL/Hr) IV Continuous <Continuous>  lacosamide IVPB 250 milliGRAM(s) IV Intermittent every 12 hours  meropenem  IVPB 1000 milliGRAM(s) IV Intermittent every 8 hours  pantoprazole  Injectable 40 milliGRAM(s) IV Push daily  Parenteral Nutrition - Adult 1 Each (46 mL/Hr) TPN Continuous <Continuous>  sodium chloride 3%  Inhalation 4 milliLiter(s) Inhalation every 6 hours    MEDICATIONS  (PRN):  dextrose Oral Gel 15 Gram(s) Oral once PRN Blood Glucose LESS THAN 70 milliGRAM(s)/deciliter  HYDROmorphone  Injectable 1 milliGRAM(s) IV Push every 4 hours PRN breakthrough pain  sodium chloride 0.9% lock flush 10 milliLiter(s) IV Push every 1 hour PRN Pre/post blood products, medications, blood draw, and to maintain line patency        Vital Signs Last 24 Hrs  T(C): 37.4 (13 Jun 2023 10:00), Max: 37.7 (13 Jun 2023 00:15)  T(F): 99.3 (13 Jun 2023 10:00), Max: 99.8 (13 Jun 2023 00:15)  HR: 112 (13 Jun 2023 12:00) (98 - 125)  BP: --  BP(mean): --  RR: 44 (13 Jun 2023 12:00) (10 - 44)  SpO2: 100% (13 Jun 2023 12:00) (100% - 100%)    Parameters below as of 13 Jun 2023 12:00  Patient On (Oxygen Delivery Method): ventilator, CPAP    O2 Concentration (%): 40    PHYSICAL EXAM:  Constitutional: NAD  Eyes: IVAN, EOMI  Ear/Nose/Throat: no oral lesion, no sinus tenderness on percussion	  Neck: no JVD, no lymphadenopathy, supple  Respiratory: CTA marti  Cardiovascular: S1S2 RRR, no murmurs  Gastrointestinal:soft, (+) BS, no HSM  Extremities:no e/e/c  Vascular: DP Pulse:	right normal; left normal      LABS:                        7.1    10.36 )-----------( 351      ( 13 Jun 2023 05:30 )             22.9     06-13    139  |  103  |  39<H>  ----------------------------<  118<H>  4.1   |  25  |  0.77    Ca    8.2<L>      13 Jun 2023 05:30  Phos  4.3     06-13  Mg     2.0     06-13    TPro  6.3  /  Alb  2.1<L>  /  TBili  0.6  /  DBili  x   /  AST  15  /  ALT  11  /  AlkPhos  162<H>  06-13          MICROBIOLOGY: Culture - Bronchial (06.09.23 @ 18:54)    -  Tobramycin: S <=2   -  Tobramycin: S <=2   -  Trimethoprim/Sulfamethoxazole: S <=0.5/9.5   -  Trimethoprim/Sulfamethoxazole: S <=0.5/9.5   Gram Stain:   Moderate epithelial cells  Moderate WBC's  Rare Gram Negative Rods   -  Ampicillin: R >16 These ampicillin results predict results for amoxicillin   -  Ampicillin: R >16 These ampicillin results predict results for amoxicillin   -  Ampicillin/Sulbactam: S 8/4 Enterobacter, Klebsiella aerogenes, Citrobacter, and Serratia may develop resistance during prolonged therapy (3-4 days)   -  Ampicillin/Sulbactam: R >16/8 Enterobacter, Klebsiella aerogenes, Citrobacter, and Serratia may develop resistance during prolonged therapy (3-4 days)   -  Cefazolin: R >16 Enterobacter, Klebsiella aerogenes, Citrobacter, and Serratia may develop resistance during prolonged therapy (3-4 days)   -  Cefazolin: S <=2 Enterobacter, Klebsiella aerogenes, Citrobacter, and Serratia may develop resistance during prolonged therapy (3-4 days)   -  Cefepime: SDD 8 The breakpoint for susceptible dose dependent may indicate the usage of a higher cefepime dose for successful treatment.   -  Ceftriaxone: R >32 Enterobacter, Klebsiella aerogenes, Citrobacter, and Serratia may develop resistance during prolonged therapy   -  Ceftriaxone: S <=1 Enterobacter, Klebsiella aerogenes, Citrobacter, and Serratia may develop resistance during prolonged therapy   -  Ciprofloxacin: S <=0.25   -  Ciprofloxacin: S <=0.25   -  Ertapenem: S <=0.5   -  Ertapenem: S 0.25   -  Gentamicin: S <=2   -  Gentamicin: S <=2   -  Meropenem: S <=1   -  Piperacillin/Tazobactam: R >64   -  Piperacillin/Tazobactam: S <=8   Specimen Source: Bronch Wash LLL bronchial wash   Culture Results:   Numerous Klebsiella pneumoniae  Numerous Enterobacter cloacae complex  Rare Normal Respiratory Loni present   Organism Identification: Klebsiella pneumoniae  Enterobacter cloacae complex  Enterobacter cloacae complex   Organism: Klebsiella pneumoniae   Organism: Enterobacter cloacae complex   Organism: Enterobacter cloacae complex   Method Type: ETEST   Method Type: LIZETTE   Method Type: LIZETTE        RADIOLOGY & ADDITIONAL STUDIES: reviewed--decreasing L opacity

## 2023-06-13 NOTE — PROGRESS NOTE ADULT - SUBJECTIVE AND OBJECTIVE BOX
ON: Given 2mg IV of dilaudid for BT and started Dilaudid 1mg q4 prn BT pain . I/O: + 400  at 11pm ( but large incontinence earlier so most likely even to slightly negative). Given 40 of lasix as per sign out. Tachycardic - given lopressor 2.5 q6 standing as per Dr Stevens along with Dilaudid for pain.   6/12: Lasix 60mg at 9am. Fentanyl dec and capped at 0.2 (0.4), now off fent. TPN reordered. Renal ok w/ d/c-ing L. subclavian HD cath. Kleb sensitive to shin/CTX. Sat at edge of bed, more awake. Inc to vital 1.5 @50cc/hr. Bilious Drain flushed. Lasix 80 x1. Stop caspo per ID.     SUBJECTIVE: Patient seen and examined bedside; in pain, just received pain meds prior to exam, otherwise doing well on CPAP.    aspirin  chewable 81 milliGRAM(s) Enteral Tube every 24 hours  heparin   Injectable 5000 Unit(s) SubCutaneous every 8 hours  meropenem  IVPB 1000 milliGRAM(s) IV Intermittent every 8 hours  metoprolol tartrate Injectable 2.5 milliGRAM(s) IV Push every 6 hours      Vital Signs Last 24 Hrs  T(C): 37.7 (13 Jun 2023 05:05), Max: 37.7 (13 Jun 2023 00:15)  T(F): 99.8 (13 Jun 2023 05:05), Max: 99.8 (13 Jun 2023 00:15)  HR: 125 (13 Jun 2023 07:00) (86 - 126)  BP: --  BP(mean): --  RR: 18 (13 Jun 2023 07:00) (12 - 31)  SpO2: 100% (13 Jun 2023 07:00) (100% - 100%)    Parameters below as of 13 Jun 2023 08:00  Patient On (Oxygen Delivery Method): ventilator, CPAP      I&O's Detail    12 Jun 2023 07:01  -  13 Jun 2023 07:00  --------------------------------------------------------  IN:    Dexmedetomidine: 115.9 mL    Dexmedetomidine: 213.9 mL    Enteral Tube Flush: 60 mL    FentaNYL: 5.4 mL    FentaNYL: 2.7 mL    IV PiggyBack: 50 mL    IV PiggyBack: 50 mL    Ketamine: 218.4 mL    TPN (Total Parenteral Nutrition): 951 mL    Vital1.0: 1205 mL  Total IN: 2872.3 mL    OUT:    Bulb (mL): 30 mL    Bulb (mL): 15 mL    Bulb (mL): 280 mL    Drain (mL): 40 mL    Drain (mL): 15 mL    Incontinent per Condom Catheter (mL): 2170 mL  Total OUT: 2550 mL    Total NET: 322.3 mL      PE:    General: NAD, resting comfortably in bed  C/V: NSR, s1 s2  Pulm: Trach to vent, on CPAP, nonlabored breathing, no respiratory distress  Abd: Soft, NTND; staples in place; drains unchanged again today, all serous except for known bilious retrop  Extrem: P        LABS:                        7.1    10.36 )-----------( 351      ( 13 Jun 2023 05:30 )             22.9     06-13    139  |  103  |  39<H>  ----------------------------<  118<H>  4.1   |  25  |  0.77    Ca    8.2<L>      13 Jun 2023 05:30  Phos  4.3     06-13  Mg     2.0     06-13    TPro  6.3  /  Alb  2.1<L>  /  TBili  0.6  /  DBili  x   /  AST  15  /  ALT  11  /  AlkPhos  162<H>  06-13          RADIOLOGY & ADDITIONAL STUDIES:

## 2023-06-13 NOTE — PROGRESS NOTE ADULT - ASSESSMENT
54yo Male pt with PMH HTN, type A aortic dissection s/p Dacron grafts, AV resuspension in 2013, CAD s/p CABG x 1 SVG to RCA (5/2013 with Dr. Medina), seizure disorder (last episode on 7/4/22) S/P replacement of transverse aortic arch, second stage thoracic endovascular aortic repair, aorto-axillary bypass, AV replacement (bio 23mm), in APr 2023 and CABG x 1 (SVG-RCA) EF 75% (Ignacio, 3/20) now s/p 2nd state TEVAR on 5/5 for whom surgery was consulted for finding of acute pancreatitis with large peripancreatic/perigastric fluid collections on CTA abdomen 5/8 then acute hemorrhage starting 5/12/23 requiring 11 U pRBC over last 48 hours but with negative mesenteric angiogram 5/13/23. S/p paracentesis and LUQ collection aspiration (no drain per surgery) on 5/22/23. LUQ collection growing Klebsiella pneumoniae.    5/24/23:  LUQ drain placement by IR.     5/25/23:  LUQ drain upsizing. Placement of two additional 14 F drainage catheters in the left mid and lower abdomen. Placement of a left abdominal hematoma drain.     5/26/23:   Placement of a right ascites drain and right pelvic hematoma/abscess drain by IR. Upsized left abdomen hematoma drain and left pelvic abscess/hematoma drain to 16 Fr.    5/30/23:  LUQ drain placement.     5/31/23:  To OR for ex-lap and abdominal washout. Multiple IR drains removed as described above.     6/1/23:  Return to OR for washout and abdominal closure.     6/7/23:  CT CAP for infectious w/u showing decreased size of of abd fluid collections.     Plan:  -Continue to monitor drain outputs   -IR will continue to follow.

## 2023-06-13 NOTE — PROGRESS NOTE ADULT - ASSESSMENT
53 year old male with PMH of HTN, Type A Aortic dissection s/p Dacron grafts, AV resuspension (2013), CAD s/p CABG x 1 SVG to RCA (2013, Dr. Medina), seizure disorder, with recent admit (Mar/Apr) for replacement of transverse aortic arch, second stage TEVAR and aoto-axillary bypass, AV replacement, and CABG x1. Post op course c/b shock, TREY requiring CVVHD, dialysis. Readmitted on 4/27 ([facility] ED) for syncope at home where imaging revealed graft endoleak and pancreatitis. Patient taken back to OR 5/5 for TEVAR, with post op course now complicated by Klebseilla bacteremia (5/15), respiratory failure requiring intubation (5/18), and bronch and PEA arrest (5/19), with respiratory cultures revealing Enterobacter, E. Faecalis and Strep angionosus. Surgery reconsulted re: active hemorrhagic pancreatitis on CT (5/13), with subsequent IR aspiration of peripancreatic fluid collection (15cc, Kleb) and paracentesis - 4L (5/22). Patient started on CVVHD 5/22. Transferred to SICU (5/25), with catheter exchange and two additional drains placed on 5/26 in IR and exlap, wash out and new drain placement on 5/31. The patient returned to the OR 6/1 for evacuation of blood and placement of J tube. Patient failed extubation on 6/3 and received trach on 6/5. Noted to be febrile overnight 6/7, 6/8, 6/9. CT Ch/Ab/Pelvis overnight 6/7 with stable abdominal collections and atelectasis of LLL.     Plan:    -Wean off vent  -OOB  -Cont tube feeds to goal  -F/u bronch cx  -Flush both hematoma and retroperitoneal drains  -Lasix prn  -Pain control  -Rest of care per SICU  -Surgery team 1 following

## 2023-06-13 NOTE — PROCEDURE NOTE - ADDITIONAL PROCEDURE DETAILS
Procedure was performed in the OR, pt was intubated and under GA, pt repositioned knee to chest, lumbar spine prepped in sterile fashion, CSF obtained w/ lumbar puncture and catheter inserted passed 20 cm, guidewire removed and catheter secured to skin w/ 3 sutures. Please consult NSGY for any issues, drain is no longer patent/draining, bleeding/infection at insertion site, etc.
right heart cath inserted via cordia paop at 50 cms , paop 10 mm hg
US guided PIV placement of 5Fr intravenous catheter w guide-wire
upsized from 7.5 to prevent aspiration
guidewire visualized in RIJ using ultrasound prior to dilation and catheter placement.

## 2023-06-13 NOTE — PROGRESS NOTE ADULT - SUBJECTIVE AND OBJECTIVE BOX
INTERVAL HPI/OVERNIGHT EVENTS:  ON: Given 2mg IV of dilaudid for BT and started Dilaudid 1mg q4 prn BT pain . I/O: + 400  at 11pm ( but large incontinence earlier so most likely even to slightly negative). Given 40 of lasix as per sign out. Tachycardic - given lopressor 2.5 q6 standing as per Dr Stevens along with Dilaudid for pain.     SUBJECTIVE:  Patient seen and examined by chief resident and team on AM rounds. Patient appears comfortable on current vent settings     MEDICATIONS  (STANDING):  acetaminophen   Oral Liquid .. 975 milliGRAM(s) Enteral Tube every 6 hours  albuterol/ipratropium for Nebulization 3 milliLiter(s) Nebulizer every 6 hours  artificial  tears Solution 1 Drop(s) Both EYES two times a day  aspirin  chewable 81 milliGRAM(s) Enteral Tube every 24 hours  chlorhexidine 0.12% Liquid 15 milliLiter(s) Oral Mucosa every 12 hours  chlorhexidine 2% Cloths 1 Application(s) Topical daily  dexMEDEtomidine Infusion 0.7 MICROgram(s)/kG/Hr (12.3 mL/Hr) IV Continuous <Continuous>  dextrose 5%. 1000 milliLiter(s) (100 mL/Hr) IV Continuous <Continuous>  dextrose 50% Injectable 25 Gram(s) IV Push once  furosemide   Injectable 80 milliGRAM(s) IV Push once  glucagon  Injectable 1 milliGRAM(s) IntraMuscular once  heparin   Injectable 5000 Unit(s) SubCutaneous every 8 hours  HYDROmorphone  Injectable 3 milliGRAM(s) IV Push every 4 hours  insulin lispro (ADMELOG) corrective regimen sliding scale   SubCutaneous every 6 hours  ketamine Infusion 0.15 mG/kG/Hr (6.83 mL/Hr) IV Continuous <Continuous>  lacosamide IVPB 250 milliGRAM(s) IV Intermittent every 12 hours  meropenem  IVPB 1000 milliGRAM(s) IV Intermittent every 8 hours  metoprolol tartrate Injectable 2.5 milliGRAM(s) IV Push every 6 hours  pantoprazole  Injectable 40 milliGRAM(s) IV Push daily  Parenteral Nutrition - Adult 1 Each (46 mL/Hr) TPN Continuous <Continuous>  sodium chloride 3%  Inhalation 4 milliLiter(s) Inhalation every 6 hours    MEDICATIONS  (PRN):  dextrose Oral Gel 15 Gram(s) Oral once PRN Blood Glucose LESS THAN 70 milliGRAM(s)/deciliter  HYDROmorphone  Injectable 1 milliGRAM(s) IV Push every 4 hours PRN breakthrough pain  sodium chloride 0.9% lock flush 10 milliLiter(s) IV Push every 1 hour PRN Pre/post blood products, medications, blood draw, and to maintain line patency      Vital Signs Last 24 Hrs  T(C): 37.4 (13 Jun 2023 10:00), Max: 37.7 (13 Jun 2023 00:15)  T(F): 99.3 (13 Jun 2023 10:00), Max: 99.8 (13 Jun 2023 00:15)  HR: 116 (13 Jun 2023 10:00) (86 - 125)  BP: --  BP(mean): --  RR: 16 (13 Jun 2023 10:00) (10 - 31)  SpO2: 100% (13 Jun 2023 10:00) (100% - 100%)    Parameters below as of 13 Jun 2023 10:00  Patient On (Oxygen Delivery Method): ventilator, CPAP    O2 Concentration (%): 40    PHYSICAL EXAM:                    I&O's Detail    12 Jun 2023 07:01  -  13 Jun 2023 07:00  --------------------------------------------------------  IN:    Dexmedetomidine: 115.9 mL    Dexmedetomidine: 213.9 mL    Enteral Tube Flush: 60 mL    FentaNYL: 5.4 mL    FentaNYL: 2.7 mL    IV PiggyBack: 50 mL    IV PiggyBack: 50 mL    Ketamine: 218.4 mL    TPN (Total Parenteral Nutrition): 951 mL    Vital1.0: 1205 mL  Total IN: 2872.3 mL    OUT:    Bulb (mL): 30 mL    Bulb (mL): 15 mL    Bulb (mL): 280 mL    Drain (mL): 40 mL    Drain (mL): 15 mL    Incontinent per Condom Catheter (mL): 2170 mL  Total OUT: 2550 mL    Total NET: 322.3 mL      13 Jun 2023 07:01  -  13 Jun 2023 10:55  --------------------------------------------------------  IN:    Dexmedetomidine: 27.3 mL    Enteral Tube Flush: 60 mL    Ketamine: 18.2 mL    Ketamine: 6.8 mL    TPN (Total Parenteral Nutrition): 138 mL    Vital1.0: 181 mL  Total IN: 431.3 mL    OUT:    Incontinent per Condom Catheter (mL): 300 mL  Total OUT: 300 mL    Total NET: 131.3 mL          LABS:                        7.1    10.36 )-----------( 351      ( 13 Jun 2023 05:30 )             22.9     06-13    139  |  103  |  39<H>  ----------------------------<  118<H>  4.1   |  25  |  0.77    Ca    8.2<L>      13 Jun 2023 05:30  Phos  4.3     06-13  Mg     2.0     06-13    TPro  6.3  /  Alb  2.1<L>  /  TBili  0.6  /  DBili  x   /  AST  15  /  ALT  11  /  AlkPhos  162<H>  06-13          RADIOLOGY & ADDITIONAL STUDIES: INTERVAL HPI/OVERNIGHT EVENTS:  ON: Given 2mg IV of dilaudid for BT and started Dilaudid 1mg q4 prn BT pain . I/O: + 400  at 11pm ( but large incontinence earlier so most likely even to slightly negative). Given 40 of lasix as per sign out. Tachycardic - given lopressor 2.5 q6 standing as per Dr Stevens along with Dilaudid for pain.     SUBJECTIVE:  Patient seen and examined by chief resident and team on AM rounds. Patient appears comfortable on current vent settings (PEEP 8, FiO2 40%, Pressure support 5). Pain well controlled with dilaudid pushes. Pt continues off of fentanyl gtt, will continue to wean precedex (0.4) and ketamine gtts (0.2). Will d/c R. IJ catheter later today and gain long term peripheral venous access later today. Increased vital to 1.5 at goal today, d/c'd TPN.     MEDICATIONS  (STANDING):  acetaminophen   Oral Liquid .. 975 milliGRAM(s) Enteral Tube every 6 hours  albuterol/ipratropium for Nebulization 3 milliLiter(s) Nebulizer every 6 hours  artificial  tears Solution 1 Drop(s) Both EYES two times a day  aspirin  chewable 81 milliGRAM(s) Enteral Tube every 24 hours  chlorhexidine 0.12% Liquid 15 milliLiter(s) Oral Mucosa every 12 hours  chlorhexidine 2% Cloths 1 Application(s) Topical daily  dexMEDEtomidine Infusion 0.7 MICROgram(s)/kG/Hr (12.3 mL/Hr) IV Continuous <Continuous>  dextrose 5%. 1000 milliLiter(s) (100 mL/Hr) IV Continuous <Continuous>  dextrose 50% Injectable 25 Gram(s) IV Push once  furosemide   Injectable 80 milliGRAM(s) IV Push once  glucagon  Injectable 1 milliGRAM(s) IntraMuscular once  heparin   Injectable 5000 Unit(s) SubCutaneous every 8 hours  HYDROmorphone  Injectable 3 milliGRAM(s) IV Push every 4 hours  insulin lispro (ADMELOG) corrective regimen sliding scale   SubCutaneous every 6 hours  ketamine Infusion 0.15 mG/kG/Hr (6.83 mL/Hr) IV Continuous <Continuous>  lacosamide IVPB 250 milliGRAM(s) IV Intermittent every 12 hours  meropenem  IVPB 1000 milliGRAM(s) IV Intermittent every 8 hours  metoprolol tartrate Injectable 2.5 milliGRAM(s) IV Push every 6 hours  pantoprazole  Injectable 40 milliGRAM(s) IV Push daily  Parenteral Nutrition - Adult 1 Each (46 mL/Hr) TPN Continuous <Continuous>  sodium chloride 3%  Inhalation 4 milliLiter(s) Inhalation every 6 hours    MEDICATIONS  (PRN):  dextrose Oral Gel 15 Gram(s) Oral once PRN Blood Glucose LESS THAN 70 milliGRAM(s)/deciliter  HYDROmorphone  Injectable 1 milliGRAM(s) IV Push every 4 hours PRN breakthrough pain  sodium chloride 0.9% lock flush 10 milliLiter(s) IV Push every 1 hour PRN Pre/post blood products, medications, blood draw, and to maintain line patency      Vital Signs Last 24 Hrs  T(C): 37.4 (13 Jun 2023 10:00), Max: 37.7 (13 Jun 2023 00:15)  T(F): 99.3 (13 Jun 2023 10:00), Max: 99.8 (13 Jun 2023 00:15)  HR: 116 (13 Jun 2023 10:00) (86 - 125)  BP: --  BP(mean): --  RR: 16 (13 Jun 2023 10:00) (10 - 31)  SpO2: 100% (13 Jun 2023 10:00) (100% - 100%)    Parameters below as of 13 Jun 2023 10:00  Patient On (Oxygen Delivery Method): ventilator, CPAP    O2 Concentration (%): 40    PHYSICAL EXAM:                    I&O's Detail    12 Jun 2023 07:01  -  13 Jun 2023 07:00  --------------------------------------------------------  IN:    Dexmedetomidine: 115.9 mL    Dexmedetomidine: 213.9 mL    Enteral Tube Flush: 60 mL    FentaNYL: 5.4 mL    FentaNYL: 2.7 mL    IV PiggyBack: 50 mL    IV PiggyBack: 50 mL    Ketamine: 218.4 mL    TPN (Total Parenteral Nutrition): 951 mL    Vital1.0: 1205 mL  Total IN: 2872.3 mL    OUT:    Bulb (mL): 30 mL    Bulb (mL): 15 mL    Bulb (mL): 280 mL    Drain (mL): 40 mL    Drain (mL): 15 mL    Incontinent per Condom Catheter (mL): 2170 mL  Total OUT: 2550 mL    Total NET: 322.3 mL      13 Jun 2023 07:01  -  13 Jun 2023 10:55  --------------------------------------------------------  IN:    Dexmedetomidine: 27.3 mL    Enteral Tube Flush: 60 mL    Ketamine: 18.2 mL    Ketamine: 6.8 mL    TPN (Total Parenteral Nutrition): 138 mL    Vital1.0: 181 mL  Total IN: 431.3 mL    OUT:    Incontinent per Condom Catheter (mL): 300 mL  Total OUT: 300 mL    Total NET: 131.3 mL          LABS:                        7.1    10.36 )-----------( 351      ( 13 Jun 2023 05:30 )             22.9     06-13    139  |  103  |  39<H>  ----------------------------<  118<H>  4.1   |  25  |  0.77    Ca    8.2<L>      13 Jun 2023 05:30  Phos  4.3     06-13  Mg     2.0     06-13    TPro  6.3  /  Alb  2.1<L>  /  TBili  0.6  /  DBili  x   /  AST  15  /  ALT  11  /  AlkPhos  162<H>  06-13          RADIOLOGY & ADDITIONAL STUDIES: INTERVAL HPI/OVERNIGHT EVENTS:  ON: Given 2mg IV of dilaudid for BT and started Dilaudid 1mg q4 prn BT pain . I/O: + 400  at 11pm ( but large incontinence earlier so most likely even to slightly negative). Given 40 of lasix as per sign out. Tachycardic - given lopressor 2.5 q6 standing as per Dr Stevens along with Dilaudid for pain.     SUBJECTIVE:  Patient seen and examined by chief resident and team on AM rounds. Patient appears comfortable on current vent settings (PEEP 8, FiO2 40%, Pressure support 5). Pain well controlled with dilaudid pushes. Pt continues off of fentanyl gtt, will continue to wean precedex (0.4) and ketamine gtts (0.2). Will d/c R. IJ catheter later today and gain long term peripheral venous access later today. Increased vital to 1.5 at goal today, d/c'd TPN. Patient remained afebrile with HR in low 100s -110s, VS otherwise wnl on ventilator.     MEDICATIONS  (STANDING):  acetaminophen   Oral Liquid .. 975 milliGRAM(s) Enteral Tube every 6 hours  albuterol/ipratropium for Nebulization 3 milliLiter(s) Nebulizer every 6 hours  artificial  tears Solution 1 Drop(s) Both EYES two times a day  aspirin  chewable 81 milliGRAM(s) Enteral Tube every 24 hours  chlorhexidine 0.12% Liquid 15 milliLiter(s) Oral Mucosa every 12 hours  chlorhexidine 2% Cloths 1 Application(s) Topical daily  dexMEDEtomidine Infusion 0.7 MICROgram(s)/kG/Hr (12.3 mL/Hr) IV Continuous <Continuous>  dextrose 5%. 1000 milliLiter(s) (100 mL/Hr) IV Continuous <Continuous>  dextrose 50% Injectable 25 Gram(s) IV Push once  furosemide   Injectable 80 milliGRAM(s) IV Push once  glucagon  Injectable 1 milliGRAM(s) IntraMuscular once  heparin   Injectable 5000 Unit(s) SubCutaneous every 8 hours  HYDROmorphone  Injectable 3 milliGRAM(s) IV Push every 4 hours  insulin lispro (ADMELOG) corrective regimen sliding scale   SubCutaneous every 6 hours  ketamine Infusion 0.15 mG/kG/Hr (6.83 mL/Hr) IV Continuous <Continuous>  lacosamide IVPB 250 milliGRAM(s) IV Intermittent every 12 hours  meropenem  IVPB 1000 milliGRAM(s) IV Intermittent every 8 hours  metoprolol tartrate Injectable 2.5 milliGRAM(s) IV Push every 6 hours  pantoprazole  Injectable 40 milliGRAM(s) IV Push daily  Parenteral Nutrition - Adult 1 Each (46 mL/Hr) TPN Continuous <Continuous>  sodium chloride 3%  Inhalation 4 milliLiter(s) Inhalation every 6 hours    MEDICATIONS  (PRN):  dextrose Oral Gel 15 Gram(s) Oral once PRN Blood Glucose LESS THAN 70 milliGRAM(s)/deciliter  HYDROmorphone  Injectable 1 milliGRAM(s) IV Push every 4 hours PRN breakthrough pain  sodium chloride 0.9% lock flush 10 milliLiter(s) IV Push every 1 hour PRN Pre/post blood products, medications, blood draw, and to maintain line patency      Vital Signs Last 24 Hrs  T(C): 37.4 (13 Jun 2023 10:00), Max: 37.7 (13 Jun 2023 00:15)  T(F): 99.3 (13 Jun 2023 10:00), Max: 99.8 (13 Jun 2023 00:15)  HR: 116 (13 Jun 2023 10:00) (86 - 125)  BP: --  BP(mean): --  RR: 16 (13 Jun 2023 10:00) (10 - 31)  SpO2: 100% (13 Jun 2023 10:00) (100% - 100%)    Parameters below as of 13 Jun 2023 10:00  Patient On (Oxygen Delivery Method): ventilator, CPAP    O2 Concentration (%): 40    PHYSICAL EXAM:  GEN: On precedex gtt, on ketamine gtt. NAD, resting comfortably w/ ventilator attached to tracheostomy.   EYES: PERRLA and symmetric, EOMI, No conjunctival or scleral injection, non-icteric  ENMT: Oral mucosa with moist membranes.   RESP: Tracheostomy in place, with minimal dried old blood surrounding it. Connected to CPAP settings on the ventilator, PEEP 8, Pressure support 5. No respiratory distress, no use of accessory muscles; Lungs with some scattered ronchi b/l.   CV: RRR, +S1S2, mitral valve murmur noted at cardiac apex.   GI: soft, mild ttp around incisions. Midline incision c/d/i.  ; Condom catheter in place with clear, yellow urine.   SKIN: No rashes or ulcers noted.  NEURO: Arousable with speech, able to follow commands. Able to squeeze fingers. Motor and sensation grossly intact in all 4 extremities. RASS 0 to -1  PSYCH: able to follow commands.  Drains: L. hemidiaphragm YANET ss, L. abdominal hematoma drain bilious, L. peripancreatic drain ss (more serous), L. retroperitoneal yanet ss (sang >serous), R. pelvic yanet ss.   LINES: R. IJ TLC,, R. A-line.     I&O's Detail    12 Jun 2023 07:01  -  13 Jun 2023 07:00  --------------------------------------------------------  IN:    Dexmedetomidine: 115.9 mL    Dexmedetomidine: 213.9 mL    Enteral Tube Flush: 60 mL    FentaNYL: 5.4 mL    FentaNYL: 2.7 mL    IV PiggyBack: 50 mL    IV PiggyBack: 50 mL    Ketamine: 218.4 mL    TPN (Total Parenteral Nutrition): 951 mL    Vital1.0: 1205 mL  Total IN: 2872.3 mL    OUT:    Bulb (mL): 30 mL    Bulb (mL): 15 mL    Bulb (mL): 280 mL    Drain (mL): 40 mL    Drain (mL): 15 mL    Incontinent per Condom Catheter (mL): 2170 mL  Total OUT: 2550 mL    Total NET: 322.3 mL      13 Jun 2023 07:01  -  13 Jun 2023 10:55  --------------------------------------------------------  IN:    Dexmedetomidine: 27.3 mL    Enteral Tube Flush: 60 mL    Ketamine: 18.2 mL    Ketamine: 6.8 mL    TPN (Total Parenteral Nutrition): 138 mL    Vital1.0: 181 mL  Total IN: 431.3 mL    OUT:    Incontinent per Condom Catheter (mL): 300 mL  Total OUT: 300 mL    Total NET: 131.3 mL          LABS:                        7.1    10.36 )-----------( 351      ( 13 Jun 2023 05:30 )             22.9     06-13    139  |  103  |  39<H>  ----------------------------<  118<H>  4.1   |  25  |  0.77    Ca    8.2<L>      13 Jun 2023 05:30  Phos  4.3     06-13  Mg     2.0     06-13    TPro  6.3  /  Alb  2.1<L>  /  TBili  0.6  /  DBili  x   /  AST  15  /  ALT  11  /  AlkPhos  162<H>  06-13          RADIOLOGY & ADDITIONAL STUDIES:

## 2023-06-14 LAB
ALBUMIN SERPL ELPH-MCNC: 2.3 G/DL — LOW (ref 3.3–5)
ALP SERPL-CCNC: 147 U/L — HIGH (ref 40–120)
ALT FLD-CCNC: 15 U/L — SIGNIFICANT CHANGE UP (ref 10–45)
ANION GAP SERPL CALC-SCNC: 7 MMOL/L — SIGNIFICANT CHANGE UP (ref 5–17)
AST SERPL-CCNC: 20 U/L — SIGNIFICANT CHANGE UP (ref 10–40)
BASE EXCESS BLDA CALC-SCNC: 4.3 MMOL/L — HIGH (ref -2–3)
BASOPHILS # BLD AUTO: 0.03 K/UL — SIGNIFICANT CHANGE UP (ref 0–0.2)
BASOPHILS NFR BLD AUTO: 0.3 % — SIGNIFICANT CHANGE UP (ref 0–2)
BILIRUB DIRECT SERPL-MCNC: 0.3 MG/DL — SIGNIFICANT CHANGE UP (ref 0–0.3)
BILIRUB INDIRECT FLD-MCNC: 0.3 MG/DL — SIGNIFICANT CHANGE UP (ref 0.2–1)
BILIRUB SERPL-MCNC: 0.7 MG/DL — SIGNIFICANT CHANGE UP (ref 0.2–1.2)
BUN SERPL-MCNC: 39 MG/DL — HIGH (ref 7–23)
CALCIUM SERPL-MCNC: 8.4 MG/DL — SIGNIFICANT CHANGE UP (ref 8.4–10.5)
CHLORIDE SERPL-SCNC: 106 MMOL/L — SIGNIFICANT CHANGE UP (ref 96–108)
CO2 BLDA-SCNC: 31 MMOL/L — HIGH (ref 19–24)
CO2 SERPL-SCNC: 29 MMOL/L — SIGNIFICANT CHANGE UP (ref 22–31)
CREAT SERPL-MCNC: 0.67 MG/DL — SIGNIFICANT CHANGE UP (ref 0.5–1.3)
CULTURE RESULTS: SIGNIFICANT CHANGE UP
EGFR: 111 ML/MIN/1.73M2 — SIGNIFICANT CHANGE UP
EOSINOPHIL # BLD AUTO: 0.13 K/UL — SIGNIFICANT CHANGE UP (ref 0–0.5)
EOSINOPHIL NFR BLD AUTO: 1.1 % — SIGNIFICANT CHANGE UP (ref 0–6)
GLUCOSE BLDC GLUCOMTR-MCNC: 102 MG/DL — HIGH (ref 70–99)
GLUCOSE BLDC GLUCOMTR-MCNC: 108 MG/DL — HIGH (ref 70–99)
GLUCOSE BLDC GLUCOMTR-MCNC: 113 MG/DL — HIGH (ref 70–99)
GLUCOSE BLDC GLUCOMTR-MCNC: 130 MG/DL — HIGH (ref 70–99)
GLUCOSE SERPL-MCNC: 126 MG/DL — HIGH (ref 70–99)
HCO3 BLDA-SCNC: 30 MMOL/L — HIGH (ref 21–28)
HCT VFR BLD CALC: 23.6 % — LOW (ref 39–50)
HGB BLD-MCNC: 7.4 G/DL — LOW (ref 13–17)
IMM GRANULOCYTES NFR BLD AUTO: 0.8 % — SIGNIFICANT CHANGE UP (ref 0–0.9)
LYMPHOCYTES # BLD AUTO: 0.85 K/UL — LOW (ref 1–3.3)
LYMPHOCYTES # BLD AUTO: 7.4 % — LOW (ref 13–44)
MAGNESIUM SERPL-MCNC: 1.9 MG/DL — SIGNIFICANT CHANGE UP (ref 1.6–2.6)
MCHC RBC-ENTMCNC: 29.7 PG — SIGNIFICANT CHANGE UP (ref 27–34)
MCHC RBC-ENTMCNC: 31.4 GM/DL — LOW (ref 32–36)
MCV RBC AUTO: 94.8 FL — SIGNIFICANT CHANGE UP (ref 80–100)
MONOCYTES # BLD AUTO: 1.14 K/UL — HIGH (ref 0–0.9)
MONOCYTES NFR BLD AUTO: 9.9 % — SIGNIFICANT CHANGE UP (ref 2–14)
NEUTROPHILS # BLD AUTO: 9.23 K/UL — HIGH (ref 1.8–7.4)
NEUTROPHILS NFR BLD AUTO: 80.5 % — HIGH (ref 43–77)
NRBC # BLD: 0 /100 WBCS — SIGNIFICANT CHANGE UP (ref 0–0)
PCO2 BLDA: 47 MMHG — SIGNIFICANT CHANGE UP (ref 35–48)
PH BLDA: 7.41 — SIGNIFICANT CHANGE UP (ref 7.35–7.45)
PHOSPHATE SERPL-MCNC: 3.7 MG/DL — SIGNIFICANT CHANGE UP (ref 2.5–4.5)
PLATELET # BLD AUTO: 441 K/UL — HIGH (ref 150–400)
PO2 BLDA: 193 MMHG — HIGH (ref 83–108)
POTASSIUM SERPL-MCNC: 3.6 MMOL/L — SIGNIFICANT CHANGE UP (ref 3.5–5.3)
POTASSIUM SERPL-SCNC: 3.6 MMOL/L — SIGNIFICANT CHANGE UP (ref 3.5–5.3)
PROT SERPL-MCNC: 6.1 G/DL — SIGNIFICANT CHANGE UP (ref 6–8.3)
RBC # BLD: 2.49 M/UL — LOW (ref 4.2–5.8)
RBC # FLD: 16.2 % — HIGH (ref 10.3–14.5)
SAO2 % BLDA: 100 % — HIGH (ref 94–98)
SODIUM SERPL-SCNC: 142 MMOL/L — SIGNIFICANT CHANGE UP (ref 135–145)
SPECIMEN SOURCE: SIGNIFICANT CHANGE UP
WBC # BLD: 11.89 K/UL — HIGH (ref 3.8–10.5)
WBC # FLD AUTO: 11.89 K/UL — HIGH (ref 3.8–10.5)

## 2023-06-14 PROCEDURE — 93306 TTE W/DOPPLER COMPLETE: CPT | Mod: 26

## 2023-06-14 PROCEDURE — 71045 X-RAY EXAM CHEST 1 VIEW: CPT | Mod: 26

## 2023-06-14 PROCEDURE — 99233 SBSQ HOSP IP/OBS HIGH 50: CPT | Mod: GC

## 2023-06-14 PROCEDURE — 99232 SBSQ HOSP IP/OBS MODERATE 35: CPT

## 2023-06-14 RX ORDER — POTASSIUM CHLORIDE 20 MEQ
30 PACKET (EA) ORAL ONCE
Refills: 0 | Status: DISCONTINUED | OUTPATIENT
Start: 2023-06-14 | End: 2023-06-14

## 2023-06-14 RX ORDER — MULTIVIT-MIN/FERROUS GLUCONATE 9 MG/15 ML
15 LIQUID (ML) ORAL DAILY
Refills: 0 | Status: DISCONTINUED | OUTPATIENT
Start: 2023-06-14 | End: 2023-07-19

## 2023-06-14 RX ORDER — MAGNESIUM SULFATE 500 MG/ML
1 VIAL (ML) INJECTION ONCE
Refills: 0 | Status: COMPLETED | OUTPATIENT
Start: 2023-06-14 | End: 2023-06-14

## 2023-06-14 RX ORDER — POTASSIUM CHLORIDE 20 MEQ
40 PACKET (EA) ORAL ONCE
Refills: 0 | Status: COMPLETED | OUTPATIENT
Start: 2023-06-14 | End: 2023-06-14

## 2023-06-14 RX ORDER — FUROSEMIDE 40 MG
80 TABLET ORAL ONCE
Refills: 0 | Status: COMPLETED | OUTPATIENT
Start: 2023-06-14 | End: 2023-06-14

## 2023-06-14 RX ORDER — OXYCODONE HYDROCHLORIDE 5 MG/1
20 TABLET ORAL EVERY 4 HOURS
Refills: 0 | Status: DISCONTINUED | OUTPATIENT
Start: 2023-06-14 | End: 2023-06-14

## 2023-06-14 RX ORDER — OXYCODONE HYDROCHLORIDE 5 MG/1
40 TABLET ORAL EVERY 4 HOURS
Refills: 0 | Status: DISCONTINUED | OUTPATIENT
Start: 2023-06-14 | End: 2023-06-17

## 2023-06-14 RX ORDER — KETAMINE HYDROCHLORIDE 100 MG/ML
0.05 INJECTION INTRAMUSCULAR; INTRAVENOUS
Qty: 200 | Refills: 0 | Status: DISCONTINUED | OUTPATIENT
Start: 2023-06-14 | End: 2023-06-14

## 2023-06-14 RX ORDER — POTASSIUM CHLORIDE 20 MEQ
10 PACKET (EA) ORAL
Refills: 0 | Status: DISCONTINUED | OUTPATIENT
Start: 2023-06-14 | End: 2023-06-14

## 2023-06-14 RX ORDER — OXYCODONE HYDROCHLORIDE 5 MG/1
20 TABLET ORAL ONCE
Refills: 0 | Status: DISCONTINUED | OUTPATIENT
Start: 2023-06-14 | End: 2023-06-14

## 2023-06-14 RX ORDER — DEXMEDETOMIDINE HYDROCHLORIDE IN 0.9% SODIUM CHLORIDE 4 UG/ML
0.2 INJECTION INTRAVENOUS
Qty: 400 | Refills: 0 | Status: DISCONTINUED | OUTPATIENT
Start: 2023-06-14 | End: 2023-06-16

## 2023-06-14 RX ORDER — HYDROMORPHONE HYDROCHLORIDE 2 MG/ML
2 INJECTION INTRAMUSCULAR; INTRAVENOUS; SUBCUTANEOUS ONCE
Refills: 0 | Status: DISCONTINUED | OUTPATIENT
Start: 2023-06-14 | End: 2023-06-14

## 2023-06-14 RX ADMIN — HYDROMORPHONE HYDROCHLORIDE 1 MILLIGRAM(S): 2 INJECTION INTRAMUSCULAR; INTRAVENOUS; SUBCUTANEOUS at 07:53

## 2023-06-14 RX ADMIN — OXYCODONE HYDROCHLORIDE 20 MILLIGRAM(S): 5 TABLET ORAL at 13:36

## 2023-06-14 RX ADMIN — OXYCODONE HYDROCHLORIDE 40 MILLIGRAM(S): 5 TABLET ORAL at 22:40

## 2023-06-14 RX ADMIN — HYDROMORPHONE HYDROCHLORIDE 3 MILLIGRAM(S): 2 INJECTION INTRAMUSCULAR; INTRAVENOUS; SUBCUTANEOUS at 05:20

## 2023-06-14 RX ADMIN — Medication 81 MILLIGRAM(S): at 13:36

## 2023-06-14 RX ADMIN — HYDROMORPHONE HYDROCHLORIDE 1 MILLIGRAM(S): 2 INJECTION INTRAMUSCULAR; INTRAVENOUS; SUBCUTANEOUS at 20:10

## 2023-06-14 RX ADMIN — FENTANYL CITRATE 25 MICROGRAM(S): 50 INJECTION INTRAVENOUS at 00:00

## 2023-06-14 RX ADMIN — SODIUM CHLORIDE 4 MILLILITER(S): 9 INJECTION INTRAMUSCULAR; INTRAVENOUS; SUBCUTANEOUS at 22:17

## 2023-06-14 RX ADMIN — SODIUM CHLORIDE 4 MILLILITER(S): 9 INJECTION INTRAMUSCULAR; INTRAVENOUS; SUBCUTANEOUS at 16:05

## 2023-06-14 RX ADMIN — Medication 80 MILLIGRAM(S): at 11:54

## 2023-06-14 RX ADMIN — SODIUM CHLORIDE 4 MILLILITER(S): 9 INJECTION INTRAMUSCULAR; INTRAVENOUS; SUBCUTANEOUS at 09:30

## 2023-06-14 RX ADMIN — HEPARIN SODIUM 5000 UNIT(S): 5000 INJECTION INTRAVENOUS; SUBCUTANEOUS at 22:07

## 2023-06-14 RX ADMIN — HYDROMORPHONE HYDROCHLORIDE 3 MILLIGRAM(S): 2 INJECTION INTRAMUSCULAR; INTRAVENOUS; SUBCUTANEOUS at 05:35

## 2023-06-14 RX ADMIN — Medication 975 MILLIGRAM(S): at 10:00

## 2023-06-14 RX ADMIN — Medication 40 MILLIEQUIVALENT(S): at 18:41

## 2023-06-14 RX ADMIN — HYDROMORPHONE HYDROCHLORIDE 1 MILLIGRAM(S): 2 INJECTION INTRAMUSCULAR; INTRAVENOUS; SUBCUTANEOUS at 20:25

## 2023-06-14 RX ADMIN — CHLORHEXIDINE GLUCONATE 1 APPLICATION(S): 213 SOLUTION TOPICAL at 11:56

## 2023-06-14 RX ADMIN — CHLORHEXIDINE GLUCONATE 15 MILLILITER(S): 213 SOLUTION TOPICAL at 17:03

## 2023-06-14 RX ADMIN — Medication 975 MILLIGRAM(S): at 22:06

## 2023-06-14 RX ADMIN — LACOSAMIDE 150 MILLIGRAM(S): 50 TABLET ORAL at 19:00

## 2023-06-14 RX ADMIN — Medication 975 MILLIGRAM(S): at 17:03

## 2023-06-14 RX ADMIN — OXYCODONE HYDROCHLORIDE 40 MILLIGRAM(S): 5 TABLET ORAL at 22:06

## 2023-06-14 RX ADMIN — Medication 100 GRAM(S): at 11:55

## 2023-06-14 RX ADMIN — Medication 975 MILLIGRAM(S): at 06:00

## 2023-06-14 RX ADMIN — HEPARIN SODIUM 5000 UNIT(S): 5000 INJECTION INTRAVENOUS; SUBCUTANEOUS at 13:36

## 2023-06-14 RX ADMIN — HYDROMORPHONE HYDROCHLORIDE 1 MILLIGRAM(S): 2 INJECTION INTRAMUSCULAR; INTRAVENOUS; SUBCUTANEOUS at 11:54

## 2023-06-14 RX ADMIN — SODIUM CHLORIDE 4 MILLILITER(S): 9 INJECTION INTRAMUSCULAR; INTRAVENOUS; SUBCUTANEOUS at 06:46

## 2023-06-14 RX ADMIN — OXYCODONE HYDROCHLORIDE 40 MILLIGRAM(S): 5 TABLET ORAL at 18:00

## 2023-06-14 RX ADMIN — HEPARIN SODIUM 5000 UNIT(S): 5000 INJECTION INTRAVENOUS; SUBCUTANEOUS at 05:18

## 2023-06-14 RX ADMIN — Medication 3 MILLILITER(S): at 22:16

## 2023-06-14 RX ADMIN — Medication 3 MILLILITER(S): at 16:04

## 2023-06-14 RX ADMIN — HYDROMORPHONE HYDROCHLORIDE 2 MILLIGRAM(S): 2 INJECTION INTRAMUSCULAR; INTRAVENOUS; SUBCUTANEOUS at 15:50

## 2023-06-14 RX ADMIN — HYDROMORPHONE HYDROCHLORIDE 3 MILLIGRAM(S): 2 INJECTION INTRAMUSCULAR; INTRAVENOUS; SUBCUTANEOUS at 10:00

## 2023-06-14 RX ADMIN — CHLORHEXIDINE GLUCONATE 15 MILLILITER(S): 213 SOLUTION TOPICAL at 05:18

## 2023-06-14 RX ADMIN — ERTAPENEM SODIUM 120 MILLIGRAM(S): 1 INJECTION, POWDER, LYOPHILIZED, FOR SOLUTION INTRAMUSCULAR; INTRAVENOUS at 23:01

## 2023-06-14 RX ADMIN — Medication 975 MILLIGRAM(S): at 01:10

## 2023-06-14 RX ADMIN — HYDROMORPHONE HYDROCHLORIDE 1 MILLIGRAM(S): 2 INJECTION INTRAMUSCULAR; INTRAVENOUS; SUBCUTANEOUS at 08:05

## 2023-06-14 RX ADMIN — HYDROMORPHONE HYDROCHLORIDE 3 MILLIGRAM(S): 2 INJECTION INTRAMUSCULAR; INTRAVENOUS; SUBCUTANEOUS at 01:52

## 2023-06-14 RX ADMIN — Medication 15 MILLILITER(S): at 17:04

## 2023-06-14 RX ADMIN — Medication 1 DROP(S): at 07:00

## 2023-06-14 RX ADMIN — KETAMINE HYDROCHLORIDE 2.28 MG/KG/HR: 100 INJECTION INTRAMUSCULAR; INTRAVENOUS at 03:18

## 2023-06-14 RX ADMIN — HYDROMORPHONE HYDROCHLORIDE 3 MILLIGRAM(S): 2 INJECTION INTRAMUSCULAR; INTRAVENOUS; SUBCUTANEOUS at 01:38

## 2023-06-14 RX ADMIN — HYDROMORPHONE HYDROCHLORIDE 2 MILLIGRAM(S): 2 INJECTION INTRAMUSCULAR; INTRAVENOUS; SUBCUTANEOUS at 15:35

## 2023-06-14 RX ADMIN — Medication 975 MILLIGRAM(S): at 22:40

## 2023-06-14 RX ADMIN — Medication 975 MILLIGRAM(S): at 05:15

## 2023-06-14 RX ADMIN — PANTOPRAZOLE SODIUM 40 MILLIGRAM(S): 20 TABLET, DELAYED RELEASE ORAL at 11:55

## 2023-06-14 RX ADMIN — Medication 3 MILLILITER(S): at 06:46

## 2023-06-14 RX ADMIN — HYDROMORPHONE HYDROCHLORIDE 1 MILLIGRAM(S): 2 INJECTION INTRAMUSCULAR; INTRAVENOUS; SUBCUTANEOUS at 12:15

## 2023-06-14 RX ADMIN — Medication 975 MILLIGRAM(S): at 18:00

## 2023-06-14 RX ADMIN — Medication 1 DROP(S): at 18:44

## 2023-06-14 RX ADMIN — LACOSAMIDE 150 MILLIGRAM(S): 50 TABLET ORAL at 06:37

## 2023-06-14 RX ADMIN — OXYCODONE HYDROCHLORIDE 20 MILLIGRAM(S): 5 TABLET ORAL at 14:36

## 2023-06-14 RX ADMIN — Medication 80 MILLIGRAM(S): at 18:41

## 2023-06-14 RX ADMIN — HYDROMORPHONE HYDROCHLORIDE 3 MILLIGRAM(S): 2 INJECTION INTRAMUSCULAR; INTRAVENOUS; SUBCUTANEOUS at 10:15

## 2023-06-14 RX ADMIN — Medication 40 MILLIEQUIVALENT(S): at 09:01

## 2023-06-14 RX ADMIN — Medication 3 MILLILITER(S): at 09:30

## 2023-06-14 RX ADMIN — OXYCODONE HYDROCHLORIDE 40 MILLIGRAM(S): 5 TABLET ORAL at 17:03

## 2023-06-14 NOTE — CHART NOTE - NSCHARTNOTEFT_GEN_A_CORE
Infectious Diseases Anti-infective Approval Note    Medication: ertapenem  Dose*: 1g  Route: IV  Frequency: q24h  Duration**: thru 6/16/23    *Dose may be adjusted as needed for alterations in renal function.    **Reflects duration of approval and not necessarily duration of therapy     THIS IS NOT AN INFECTIOUS DISEASES CONSULTATION

## 2023-06-14 NOTE — PROGRESS NOTE ADULT - SUBJECTIVE AND OBJECTIVE BOX
INTERVAL HPI/OVERNIGHT EVENTS: SABRINA.    ROS: UTO      ANTIBIOTICS/RELEVANT:    MEDICATIONS  (STANDING):  acetaminophen   Oral Liquid .. 975 milliGRAM(s) Enteral Tube every 6 hours  albuterol/ipratropium for Nebulization 3 milliLiter(s) Nebulizer every 6 hours  artificial  tears Solution 1 Drop(s) Both EYES two times a day  aspirin  chewable 81 milliGRAM(s) Enteral Tube every 24 hours  chlorhexidine 0.12% Liquid 15 milliLiter(s) Oral Mucosa every 12 hours  chlorhexidine 2% Cloths 1 Application(s) Topical daily  dexMEDEtomidine Infusion 0.2 MICROgram(s)/kG/Hr (4.55 mL/Hr) IV Continuous <Continuous>  dextrose 5%. 1000 milliLiter(s) (100 mL/Hr) IV Continuous <Continuous>  dextrose 50% Injectable 25 Gram(s) IV Push once  ertapenem  IVPB 1000 milliGRAM(s) IV Intermittent every 24 hours  glucagon  Injectable 1 milliGRAM(s) IntraMuscular once  heparin   Injectable 5000 Unit(s) SubCutaneous every 8 hours  insulin lispro (ADMELOG) corrective regimen sliding scale   SubCutaneous every 6 hours  lacosamide IVPB 250 milliGRAM(s) IV Intermittent every 12 hours  multivitamin/minerals/iron Oral Solution (CENTRUM) 15 milliLiter(s) Oral daily  oxyCODONE    Solution 20 milliGRAM(s) Oral every 4 hours  pantoprazole  Injectable 40 milliGRAM(s) IV Push daily  sodium chloride 3%  Inhalation 4 milliLiter(s) Inhalation every 6 hours    MEDICATIONS  (PRN):  dextrose Oral Gel 15 Gram(s) Oral once PRN Blood Glucose LESS THAN 70 milliGRAM(s)/deciliter  HYDROmorphone  Injectable 1 milliGRAM(s) IV Push every 4 hours PRN breakthrough pain  sodium chloride 0.9% lock flush 10 milliLiter(s) IV Push every 1 hour PRN Pre/post blood products, medications, blood draw, and to maintain line patency        Vital Signs Last 24 Hrs  T(C): 37.6 (14 Jun 2023 09:00), Max: 38.2 (13 Jun 2023 17:30)  T(F): 99.7 (14 Jun 2023 09:00), Max: 100.8 (13 Jun 2023 17:30)  HR: 123 (14 Jun 2023 13:00) (110 - 132)  BP: --  BP(mean): --  RR: 24 (14 Jun 2023 13:00) (12 - 26)  SpO2: 99% (14 Jun 2023 13:00) (97% - 100%)    Parameters below as of 14 Jun 2023 13:00  Patient On (Oxygen Delivery Method): tracheostomy collar    O2 Concentration (%): 40    PHYSICAL EXAM:  Constitutional: NAD  Eyes: IVAN, EOMI  Ear/Nose/Throat: no oral lesion, no sinus tenderness on percussion	  Neck: no JVD, no lymphadenopathy, supple  Respiratory: CTA marti  Cardiovascular: S1S2 RRR, no murmurs  Gastrointestinal:soft, (+) BS, no HSM  Extremities:no e/e/c  Vascular: DP Pulse:	right normal; left normal      LABS:                        7.4    11.89 )-----------( 441      ( 14 Jun 2023 05:30 )             23.6     06-14    142  |  106  |  39<H>  ----------------------------<  126<H>  3.6   |  29  |  0.67    Ca    8.4      14 Jun 2023 05:30  Phos  3.7     06-14  Mg     1.9     06-14    TPro  6.1  /  Alb  2.3<L>  /  TBili  0.7  /  DBili  0.3  /  AST  20  /  ALT  15  /  AlkPhos  147<H>  06-14          MICROBIOLOGY: reviewed    RADIOLOGY & ADDITIONAL STUDIES: reviewed

## 2023-06-14 NOTE — PROGRESS NOTE ADULT - ATTENDING COMMENTS
h/o aortic dissection s/p AVR, TEVAR c/b pancreatitis, ATN, AHRF, Klebsiella septic shock  physical as above  trial of TC, tolerating CPAP  trying to convert dilaudid to oxycodone  DC ketamine  continue ertapenem, some increase in WBC noted  change to vital 1.0 at 70/hr with 2 liquid proteins  trial of lasix 80 mg to try to achieve negative fluid balance

## 2023-06-14 NOTE — PROGRESS NOTE ADULT - ASSESSMENT
Assessment:  54 YO Male w/ PMHx of HTN, type A aortic dissection (s/p Dacron grafts, AV resuspension in 2013, CAD s/p CABG x 1 SVG to RCA 5/2013 with Dr. Medina), seizure disorder, recent admission 3/20-4/14 for replacement of transverse aortic arch, second stage TEVAR and aorto-axillary bypass, AV replacement (bio 23mm), and CABG x 1 (SVG-RCA) EF 75% ([place], 3/20). Post op c/b severe cardiogenic and vasogenic shock, TREY requiring CVVHD and temporary dialysis, discharged home mid April. Pt then presented to [facility] ED (4/27-4/30) for syncope vs. seizure episode at home. Negative neuro work up for Seizures AED dose adjusted. Presented to [facility] on 5/4 for abdominal pain, imaging revealed recent graft endoleak, transferred to [facility] on 5/5 for surgical mgmt. Pt taken to OR for Second stage TEVAR on 5/5, post op course c/b Klebsiella bacteremia (5/15), resp failure requiring intubation on 5/18, Bronch 5/19 with cx growing kleb, enterobacter (zosyn, erta resistant), E faecalis, strep angionosus, hemorrhagic pancreatitis of unclear etiology with venu-pancreatic abscess (per CT A/P from 5/8 and 5/13) s/p IR aspiration of peripancreatic fluid collection (15cc sanguinous pansensitive kleb 5/22) and paracentesis (4L clear yellow fluid, ascites cx negative) on 5/22, IR venu-pancreatic abscess drainage and placement of drainage catheter on 5/24, as well as PEA arrest on. Pt transferred from CTICU to SICU for further mgmt of hemorrhagic pancreatitis. 5/31 exlap washout & replacement of 3 new JPs and abthera vac with open abdomen w/ gen surg. 6/1 ROTR, old blood evac, no active bleeding, feeding J-tube placed. 6/3 failed extubation, trach placed on 6/5. Pt febrile overnight 6/7-6/9. Repeat CT C/A/P ON 6/7 with stable abdominal collections and atelectasis of LLL. Patient remains in SICU with trach on vent, J-tube w/ tube feeds and multiple surgical drains.     Plan:  Problem 1: s/p TEVAR 5/5/23  -Transferred to SICU for further management of hemorrhagic pancreatitis  -ASA held 5/31 per primary team 2/2 blood loss anemia   -continue to monitor BP and HR  -remainder of care of pancreatitis per primary team   -No CTSx tubes, drains or tie downs remain  -CT surgery will continue to follow peripherally     Problem 2: Respiratory failure   -Pt w/ trach placed on 6/5  -S/p multiple bronchs  -Continue abx per ID  -Care per primary team     Problem 3: Hemorrhagic pancreatitis   -S/p IR drainage on 5/24  -S/p exlap washout & replacement of 3 new JPs and abthera vac with open abdomen w/ gen surg on 5/31 and ROTR, ex lap w/ old blood evac on 6/1  -Multiple CODIE drains & 1 IR drain in place being managed by gen surg team  -Care per primary team     Problem 4: Seizure disorder  -Continue Vimpat   -Recs per Epilepsy team  -Care per primary team     I have reviewed clinical labs tests and reports, radiology tests and reports, as well as old patient medical records, and discussed with the refering physician.

## 2023-06-14 NOTE — PROGRESS NOTE ADULT - SUBJECTIVE AND OBJECTIVE BOX
INTERVAL HPI/OVERNIGHT EVENTS:  ON: Tachycardic/Tachypneic. Placed back on AC overnight to rest. Agitation continued despite standing/breakthrough Dilaudid. Fentanyl 25mcg givenx1 with short term improvement. Continued pain/diaphoretic (abdominal exam unchanged). Ketamine drip restarted at .05. CPAP @ 0500.    SUBJECTIVE:  Patient seen and examined by chief resident and team on AM rounds.     MEDICATIONS  (STANDING):  acetaminophen   Oral Liquid .. 975 milliGRAM(s) Enteral Tube every 6 hours  albuterol/ipratropium for Nebulization 3 milliLiter(s) Nebulizer every 6 hours  artificial  tears Solution 1 Drop(s) Both EYES two times a day  aspirin  chewable 81 milliGRAM(s) Enteral Tube every 24 hours  chlorhexidine 0.12% Liquid 15 milliLiter(s) Oral Mucosa every 12 hours  chlorhexidine 2% Cloths 1 Application(s) Topical daily  dexMEDEtomidine Infusion 0.2 MICROgram(s)/kG/Hr (4.55 mL/Hr) IV Continuous <Continuous>  dextrose 5%. 1000 milliLiter(s) (100 mL/Hr) IV Continuous <Continuous>  dextrose 50% Injectable 25 Gram(s) IV Push once  ertapenem  IVPB 1000 milliGRAM(s) IV Intermittent every 24 hours  glucagon  Injectable 1 milliGRAM(s) IntraMuscular once  heparin   Injectable 5000 Unit(s) SubCutaneous every 8 hours  HYDROmorphone  Injectable 3 milliGRAM(s) IV Push every 4 hours  insulin lispro (ADMELOG) corrective regimen sliding scale   SubCutaneous every 6 hours  lacosamide IVPB 250 milliGRAM(s) IV Intermittent every 12 hours  multivitamin/minerals/iron Oral Solution (CENTRUM) 15 milliLiter(s) Oral daily  pantoprazole  Injectable 40 milliGRAM(s) IV Push daily  sodium chloride 3%  Inhalation 4 milliLiter(s) Inhalation every 6 hours    MEDICATIONS  (PRN):  dextrose Oral Gel 15 Gram(s) Oral once PRN Blood Glucose LESS THAN 70 milliGRAM(s)/deciliter  HYDROmorphone  Injectable 1 milliGRAM(s) IV Push every 4 hours PRN breakthrough pain  sodium chloride 0.9% lock flush 10 milliLiter(s) IV Push every 1 hour PRN Pre/post blood products, medications, blood draw, and to maintain line patency      Vital Signs Last 24 Hrs  T(C): 37.6 (14 Jun 2023 09:00), Max: 38.2 (13 Jun 2023 17:30)  T(F): 99.7 (14 Jun 2023 09:00), Max: 100.8 (13 Jun 2023 17:30)  HR: 122 (14 Jun 2023 10:00) (103 - 132)  BP: --  BP(mean): --  RR: 12 (14 Jun 2023 10:00) (12 - 46)  SpO2: 100% (14 Jun 2023 10:00) (97% - 100%)    Parameters below as of 14 Jun 2023 10:00  Patient On (Oxygen Delivery Method): ventilator, CPAP    O2 Concentration (%): 40    PHYSICAL EXAM:                  I&O's Detail    13 Jun 2023 07:01  -  14 Jun 2023 07:00  --------------------------------------------------------  IN:    Dexmedetomidine: 86.5 mL    Dexmedetomidine: 64.4 mL    Enteral Tube Flush: 120 mL    IV PiggyBack: 50 mL    IV PiggyBack: 75 mL    IV PiggyBack: 50 mL    IV PiggyBack: 250 mL    Ketamine: 18.2 mL    Ketamine: 49.9 mL    Ketamine: 9.2 mL    TPN (Total Parenteral Nutrition): 460 mL    Vital1.0: 1504 mL  Total IN: 2737.2 mL    OUT:    Bulb (mL): 25 mL    Bulb (mL): 45 mL    Bulb (mL): 540 mL    Drain (mL): 25 mL    Drain (mL): 30 mL    Incontinent per Condom Catheter (mL): 1600 mL    Voided (mL): 620 mL  Total OUT: 2885 mL    Total NET: -147.8 mL      14 Jun 2023 07:01  -  14 Jun 2023 12:13  --------------------------------------------------------  IN:    Dexmedetomidine: 4.6 mL    Dexmedetomidine: 9.2 mL    Enteral Tube Flush: 80 mL    Ketamine: 6.9 mL    Vital1.0: 189 mL  Total IN: 289.7 mL    OUT:  Total OUT: 0 mL    Total NET: 289.7 mL          LABS:                        7.4    11.89 )-----------( 441      ( 14 Jun 2023 05:30 )             23.6     06-14    142  |  106  |  39<H>  ----------------------------<  126<H>  3.6   |  29  |  0.67    Ca    8.4      14 Jun 2023 05:30  Phos  3.7     06-14  Mg     1.9     06-14    TPro  6.1  /  Alb  2.3<L>  /  TBili  0.7  /  DBili  0.3  /  AST  20  /  ALT  15  /  AlkPhos  147<H>  06-14          RADIOLOGY & ADDITIONAL STUDIES: INTERVAL HPI/OVERNIGHT EVENTS:  ON: Tachycardic/Tachypneic. Placed back on AC overnight to rest. Agitation continued despite standing/breakthrough Dilaudid. Fentanyl 25mcg givenx1 with short term improvement. Continued pain/diaphoretic (abdominal exam unchanged). Ketamine drip restarted at .05. CPAP @ 0500.    SUBJECTIVE:  Patient seen and examined by chief resident and team on AM rounds. Patient resting comfortably on CPAP ventilator settings (FiO2 40%, PEEP 8, Pressure support of 5cm H20). Will attempt trach collar today. Patient responds to verbal commands. Remained afebrile overnight, voided 620cc overnight after Lasix given last yesterday afternoon. This morning, Precedex increased for agitation, discontinued Ketamine. Pt has been having diarrhea 2/2 condensed tube feeds (vital 1.5 @64cc/hr), now on vital 1.0 @70cc/hr. Will continue to wean to PO pain medications through the J tube today.     MEDICATIONS  (STANDING):  acetaminophen   Oral Liquid .. 975 milliGRAM(s) Enteral Tube every 6 hours  albuterol/ipratropium for Nebulization 3 milliLiter(s) Nebulizer every 6 hours  artificial  tears Solution 1 Drop(s) Both EYES two times a day  aspirin  chewable 81 milliGRAM(s) Enteral Tube every 24 hours  chlorhexidine 0.12% Liquid 15 milliLiter(s) Oral Mucosa every 12 hours  chlorhexidine 2% Cloths 1 Application(s) Topical daily  dexMEDEtomidine Infusion 0.2 MICROgram(s)/kG/Hr (4.55 mL/Hr) IV Continuous <Continuous>  dextrose 5%. 1000 milliLiter(s) (100 mL/Hr) IV Continuous <Continuous>  dextrose 50% Injectable 25 Gram(s) IV Push once  ertapenem  IVPB 1000 milliGRAM(s) IV Intermittent every 24 hours  glucagon  Injectable 1 milliGRAM(s) IntraMuscular once  heparin   Injectable 5000 Unit(s) SubCutaneous every 8 hours  HYDROmorphone  Injectable 3 milliGRAM(s) IV Push every 4 hours  insulin lispro (ADMELOG) corrective regimen sliding scale   SubCutaneous every 6 hours  lacosamide IVPB 250 milliGRAM(s) IV Intermittent every 12 hours  multivitamin/minerals/iron Oral Solution (CENTRUM) 15 milliLiter(s) Oral daily  pantoprazole  Injectable 40 milliGRAM(s) IV Push daily  sodium chloride 3%  Inhalation 4 milliLiter(s) Inhalation every 6 hours    MEDICATIONS  (PRN):  dextrose Oral Gel 15 Gram(s) Oral once PRN Blood Glucose LESS THAN 70 milliGRAM(s)/deciliter  HYDROmorphone  Injectable 1 milliGRAM(s) IV Push every 4 hours PRN breakthrough pain  sodium chloride 0.9% lock flush 10 milliLiter(s) IV Push every 1 hour PRN Pre/post blood products, medications, blood draw, and to maintain line patency      Vital Signs Last 24 Hrs  T(C): 37.6 (14 Jun 2023 09:00), Max: 38.2 (13 Jun 2023 17:30)  T(F): 99.7 (14 Jun 2023 09:00), Max: 100.8 (13 Jun 2023 17:30)  HR: 122 (14 Jun 2023 10:00) (103 - 132)  BP: --  BP(mean): --  RR: 12 (14 Jun 2023 10:00) (12 - 46)  SpO2: 100% (14 Jun 2023 10:00) (97% - 100%)    Parameters below as of 14 Jun 2023 10:00  Patient On (Oxygen Delivery Method): ventilator, CPAP    O2 Concentration (%): 40    PHYSICAL EXAM:  GEN: On precedex gtt 0.2, off ketamine. NAD, resting comfortably w/ ventilator attached to tracheostomy.   EYES: PERRLA and symmetric, EOMI, No conjunctival or scleral injection, non-icteric  ENMT: Oral mucosa with moist membranes.   RESP: Tracheostomy in place, with minimal dried old blood surrounding it. Small pressure injury present directly inferior to tracheostomy. Connected to CPAP settings on the ventilator, PEEP 8, Pressure support 5. No respiratory distress, no use of accessory muscles; Lungs with some scattered ronchi b/l.   CV: RRR, +S1S2, mitral valve murmur noted at cardiac apex, louder than prior.   GI: soft, mild ttp around incisions. Midline incision c/d/i.  ; Condom catheter in place with clear, yellow urine.   Groin: R. groin with known fluid collection, no overlying skin changes or erythema.   SKIN: No rashes or ulcers noted.  NEURO: Arousable with speech, able to follow commands. Able to squeeze fingers. Motor and sensation grossly intact in all 4 extremities. RASS 0 to -1 on precedex.   PSYCH: able to follow commands.  Drains: L. hemidiaphragm YANET bilious, L. abdominal hematoma drain bilious, L. peripancreatic drain ss (more serous), L. retroperitoneal yanet ss (sang >serous), R. pelvic yanet ss.   LINES: 5Fr PIV in GABBIERODOline       I&O's Detail    13 Jun 2023 07:01  -  14 Jun 2023 07:00  --------------------------------------------------------  IN:    Dexmedetomidine: 86.5 mL    Dexmedetomidine: 64.4 mL    Enteral Tube Flush: 120 mL    IV PiggyBack: 50 mL    IV PiggyBack: 75 mL    IV PiggyBack: 50 mL    IV PiggyBack: 250 mL    Ketamine: 18.2 mL    Ketamine: 49.9 mL    Ketamine: 9.2 mL    TPN (Total Parenteral Nutrition): 460 mL    Vital1.0: 1504 mL  Total IN: 2737.2 mL    OUT:    Bulb (mL): 25 mL    Bulb (mL): 45 mL    Bulb (mL): 540 mL    Drain (mL): 25 mL    Drain (mL): 30 mL    Incontinent per Condom Catheter (mL): 1600 mL    Voided (mL): 620 mL  Total OUT: 2885 mL    Total NET: -147.8 mL      14 Jun 2023 07:01  -  14 Jun 2023 12:13  --------------------------------------------------------  IN:    Dexmedetomidine: 4.6 mL    Dexmedetomidine: 9.2 mL    Enteral Tube Flush: 80 mL    Ketamine: 6.9 mL    Vital1.0: 189 mL  Total IN: 289.7 mL    OUT:  Total OUT: 0 mL    Total NET: 289.7 mL          LABS:                        7.4    11.89 )-----------( 441      ( 14 Jun 2023 05:30 )             23.6     06-14    142  |  106  |  39<H>  ----------------------------<  126<H>  3.6   |  29  |  0.67    Ca    8.4      14 Jun 2023 05:30  Phos  3.7     06-14  Mg     1.9     06-14    TPro  6.1  /  Alb  2.3<L>  /  TBili  0.7  /  DBili  0.3  /  AST  20  /  ALT  15  /  AlkPhos  147<H>  06-14          RADIOLOGY & ADDITIONAL STUDIES:

## 2023-06-14 NOTE — PROGRESS NOTE ADULT - SUBJECTIVE AND OBJECTIVE BOX
1. 16 Fr teal LUQ peripancreatic abscess "Left Pancreatic CODIE Drain" placed on 5/24 (attached to gravity bag):   -30 cc serosanguinous output in 24 hrs. 40 cc in previous 24 hrs. Daily irrigation with 250 cc NS per primary team.     2. 16 Fr teal LUQ hematoma "Left Abdominal Hematoma CODIE Drain" placed on 5/25 and upsized on 5/26:   -25 cc dark green-brown (likely hemolyzing blood products) output in 24 hrs. 15 cc in previous 24 hrs. Flushed easily with 3cc sterile NS.

## 2023-06-14 NOTE — PROGRESS NOTE ADULT - ASSESSMENT
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 s/p trach. Course c/b VAP 2/2 Enterobacter and Klebsiella.   - advise continue ertapenem 1g IV q24h thru 6/16/23 (see accompanying approval note)    Please reconsult with ?

## 2023-06-14 NOTE — PROGRESS NOTE ADULT - SUBJECTIVE AND OBJECTIVE BOX
Subjective  No events overnight. No complaints currently.    ROS  As above, otherwise negative for constitutional/HEENT/CV/pulm/GI//MSK/neuro/derm/endocrine/psych.     MEDICATIONS  (STANDING):  acetaminophen   Oral Liquid .. 975 milliGRAM(s) Enteral Tube every 6 hours  albuterol/ipratropium for Nebulization 3 milliLiter(s) Nebulizer every 6 hours  artificial  tears Solution 1 Drop(s) Both EYES two times a day  aspirin  chewable 81 milliGRAM(s) Enteral Tube every 24 hours  chlorhexidine 0.12% Liquid 15 milliLiter(s) Oral Mucosa every 12 hours  chlorhexidine 2% Cloths 1 Application(s) Topical daily  dexMEDEtomidine Infusion 0.2 MICROgram(s)/kG/Hr (4.55 mL/Hr) IV Continuous <Continuous>  dextrose 5%. 1000 milliLiter(s) (100 mL/Hr) IV Continuous <Continuous>  dextrose 50% Injectable 25 Gram(s) IV Push once  ertapenem  IVPB 1000 milliGRAM(s) IV Intermittent every 24 hours  glucagon  Injectable 1 milliGRAM(s) IntraMuscular once  heparin   Injectable 5000 Unit(s) SubCutaneous every 8 hours  HYDROmorphone  Injectable 3 milliGRAM(s) IV Push every 4 hours  insulin lispro (ADMELOG) corrective regimen sliding scale   SubCutaneous every 6 hours  lacosamide IVPB 250 milliGRAM(s) IV Intermittent every 12 hours  multivitamin/minerals/iron Oral Solution (CENTRUM) 15 milliLiter(s) Oral daily  pantoprazole  Injectable 40 milliGRAM(s) IV Push daily  sodium chloride 3%  Inhalation 4 milliLiter(s) Inhalation every 6 hours    MEDICATIONS  (PRN):  dextrose Oral Gel 15 Gram(s) Oral once PRN Blood Glucose LESS THAN 70 milliGRAM(s)/deciliter  HYDROmorphone  Injectable 1 milliGRAM(s) IV Push every 4 hours PRN breakthrough pain  sodium chloride 0.9% lock flush 10 milliLiter(s) IV Push every 1 hour PRN Pre/post blood products, medications, blood draw, and to maintain line patency      T(C): 37.6 (06-14-23 @ 09:00), Max: 38.2 (06-13-23 @ 17:30)  HR: 122 (06-14-23 @ 10:00) (103 - 132)  BP: --  RR: 12 (06-14-23 @ 10:00) (12 - 46)  SpO2: 100% (06-14-23 @ 10:00) (97% - 100%)  Wt(kg): --    Eyes open spontaneously. Conversational with appropriate sentences.  EOMI. Visual fields full. PERRL 3>2. Face symmetrical.  Full strength throughout.  Finger-nose-finger intact R/L.  Intact to light touch throughout.    CBC Full  -  ( 14 Jun 2023 05:30 )  WBC Count : 11.89 K/uL  RBC Count : 2.49 M/uL  Hemoglobin : 7.4 g/dL  Hematocrit : 23.6 %  Platelet Count - Automated : 441 K/uL  Mean Cell Volume : 94.8 fl  Mean Cell Hemoglobin : 29.7 pg  Mean Cell Hemoglobin Concentration : 31.4 gm/dL  Auto Neutrophil # : 9.23 K/uL  Auto Lymphocyte # : 0.85 K/uL  Auto Monocyte # : 1.14 K/uL  Auto Eosinophil # : 0.13 K/uL  Auto Basophil # : 0.03 K/uL  Auto Neutrophil % : 80.5 %  Auto Lymphocyte % : 7.4 %  Auto Monocyte % : 9.9 %  Auto Eosinophil % : 1.1 %  Auto Basophil % : 0.3 %    06-14    142  |  106  |  39<H>  ----------------------------<  126<H>  3.6   |  29  |  0.67    Ca    8.4      14 Jun 2023 05:30  Phos  3.7     06-14  Mg     1.9     06-14    TPro  6.1  /  Alb  2.3<L>  /  TBili  0.7  /  DBili  0.3  /  AST  20  /  ALT  15  /  AlkPhos  147<H>  06-14    LIVER FUNCTIONS - ( 14 Jun 2023 05:30 )  Alb: 2.3 g/dL / Pro: 6.1 g/dL / ALK PHOS: 147 U/L / ALT: 15 U/L / AST: 20 U/L / GGT: x                 EEG: EPILEPSY PROGRESS NOTE:  Patient seen and examined at bedside, and appear to be in pain w/ trembling, and facial grimacing. There were no report of seizures overnight.     REVIEW OF SYSTEMS:  Unobtainable 2/2 trach    MEDICATIONS  (STANDING):  acetaminophen   Oral Liquid .. 975 milliGRAM(s) Enteral Tube every 6 hours  albuterol/ipratropium for Nebulization 3 milliLiter(s) Nebulizer every 6 hours  artificial  tears Solution 1 Drop(s) Both EYES two times a day  aspirin  chewable 81 milliGRAM(s) Enteral Tube every 24 hours  chlorhexidine 0.12% Liquid 15 milliLiter(s) Oral Mucosa every 12 hours  chlorhexidine 2% Cloths 1 Application(s) Topical daily  dexMEDEtomidine Infusion 0.2 MICROgram(s)/kG/Hr (4.55 mL/Hr) IV Continuous <Continuous>  dextrose 5%. 1000 milliLiter(s) (100 mL/Hr) IV Continuous <Continuous>  dextrose 50% Injectable 25 Gram(s) IV Push once  ertapenem  IVPB 1000 milliGRAM(s) IV Intermittent every 24 hours  glucagon  Injectable 1 milliGRAM(s) IntraMuscular once  heparin   Injectable 5000 Unit(s) SubCutaneous every 8 hours  HYDROmorphone  Injectable 3 milliGRAM(s) IV Push every 4 hours  insulin lispro (ADMELOG) corrective regimen sliding scale   SubCutaneous every 6 hours  lacosamide IVPB 250 milliGRAM(s) IV Intermittent every 12 hours  multivitamin/minerals/iron Oral Solution (CENTRUM) 15 milliLiter(s) Oral daily  pantoprazole  Injectable 40 milliGRAM(s) IV Push daily  sodium chloride 3%  Inhalation 4 milliLiter(s) Inhalation every 6 hours    MEDICATIONS  (PRN):  dextrose Oral Gel 15 Gram(s) Oral once PRN Blood Glucose LESS THAN 70 milliGRAM(s)/deciliter  HYDROmorphone  Injectable 1 milliGRAM(s) IV Push every 4 hours PRN breakthrough pain  sodium chloride 0.9% lock flush 10 milliLiter(s) IV Push every 1 hour PRN Pre/post blood products, medications, blood draw, and to maintain line patency    VITAL SIGNS:   T(C): 37.6 (06-14-23 @ 09:00), Max: 38.2 (06-13-23 @ 17:30)  HR: 122 (06-14-23 @ 10:00) (103 - 132)  BP: --  RR: 12 (06-14-23 @ 10:00) (12 - 46)  SpO2: 100% (06-14-23 @ 10:00) (97% - 100%)  Wt(kg): --    PHYSICAL EXAM:   Eyes open minimally, PERRLA 2mm brisk, does not follow commands or track bedside activity. Minimal hand grasp w/ L hand. R arm moves intermittently and spontaneously. Gross B/L LE movement.     LABS:   CBC Full  -  ( 14 Jun 2023 05:30 )  WBC Count : 11.89 K/uL  RBC Count : 2.49 M/uL  Hemoglobin : 7.4 g/dL  Hematocrit : 23.6 %  Platelet Count - Automated : 441 K/uL  Mean Cell Volume : 94.8 fl  Mean Cell Hemoglobin : 29.7 pg  Mean Cell Hemoglobin Concentration : 31.4 gm/dL  Auto Neutrophil # : 9.23 K/uL  Auto Lymphocyte # : 0.85 K/uL  Auto Monocyte # : 1.14 K/uL  Auto Eosinophil # : 0.13 K/uL  Auto Basophil # : 0.03 K/uL  Auto Neutrophil % : 80.5 %  Auto Lymphocyte % : 7.4 %  Auto Monocyte % : 9.9 %  Auto Eosinophil % : 1.1 %  Auto Basophil % : 0.3 %    06-14    142  |  106  |  39<H>  ----------------------------<  126<H>  3.6   |  29  |  0.67    Ca    8.4      14 Jun 2023 05:30  Phos  3.7     06-14  Mg     1.9     06-14    TPro  6.1  /  Alb  2.3<L>  /  TBili  0.7  /  DBili  0.3  /  AST  20  /  ALT  15  /  AlkPhos  147<H>  06-14    LIVER FUNCTIONS - ( 14 Jun 2023 05:30 )  Alb: 2.3 g/dL / Pro: 6.1 g/dL / ALK PHOS: 147 U/L / ALT: 15 U/L / AST: 20 U/L / GGT: x

## 2023-06-14 NOTE — PROGRESS NOTE ADULT - ASSESSMENT
53yMale w/ PMHx of HTN, type A aortic dissection s/p Dacron grafts, AV resuspension in 2013, CAD s/p CABG x 1 SVG to RCA (5/2013 with Dr. Medina), seizure disorder, recent admission 3/20-4/14 for replacement of transverse aortic arch, second stage TEVAR and aorto-axillary bypass, AV replacement (bio 23mm), and CABG x 1 (SVG-RCA). Pt found to have endo leak and taken to OR for TEVAR revision on 5/5, Post op course c/b Klebsiella bacteremia (5/15), resp failure requiring intubation on 5/18, Mucus plugging s/p Bronch 5/19 and PEA arrest. Post operatively pt also found to have hemorrhagic pancreatitis with venu-pancreatic abscess possibly 2* to Depakote therefore s/p IR aspiration of peripancreatic fluid collection and paracentesis on 5/22, IR venu-pancreatic abscess drainage and placement of drainage catheter on 5/24. Pt then transferred from CTICU to SICU for further mgmt of hemorrhagic pancreatitis on 5/25. s/p IR placement of 2 new drainage catheters and catheter exchange on 5/26. LUQ drainage 5/30. OR 5/31 exlap washout & replacement of 3 new JPs and abthera vac with open abdomen. RTOR 6/1, no bleeding, evac of old blood, placement of feeding J-tube. failed extubation s/p trach 6/5    NEURO: Pain control with standing Dilaudid 3q4h w/ additional PRN dilaudid, d/c'd fent gtt, Precedex gtt, d/c'd ketamine (6/14). Hx seizures: epilepsy recs appreciated, Cont vimpat. Depakote weaned off due to concerns for drug-related hemorrhagic pancreatitis. Repeat EEG (ordered)  HEENT: Artificial tears, Torticollis - PT/OT following   CV: MAP > 65. s/p PEA arrest on 5/24 night. Echo from 5/19 hyperdynamic LV, SHAJI with LVOTO (gradient 26), normal RV, mild MR, no pulm HTN. C/w ASA 81mg. repeat TTE given persistent sinus tachycardia  PULM: ARDS resolved - off NO, s/p multiple Mucus plug/ Lung collapse s/p bronch x3 (most recently on 5/26) most likely from outward obstruction: Cont rotating pt around the clock. Cont Duonebs, 3% saline. failed extubation 6/3: s/p Trach 6/5: AC 40/500/22/8 with daily CPAP trial,   GI/FEN: TF Vital 1.0 at 70cc via feeding J-tube, Protonix BID, Hemorrhagic pancreatitis: s/p multiple drain placements and paracentisis by IR team.   : AKF on HD s/p CVVHD, now w/ renal recovery, off of aquaphoresis for fluid overload. condom catheter. Lasix 60mg spot dosing.  HEME: Acute blood loss anemia requiring multiple transfusions  ENDO: mISS  ID: Ertapenem (6/13-6/16)// meropenem (6/9-6/13) vanco x 1 (6/9), caspofungin (6/9-6/12), previous Kleb bacteremia from 5/15 & 5/17, subsequent bld cx negative, PNA w/ bronch cx kleb, enterobacter (zosyn, erta resistant), E faecalis, strep angionosus from 5/19, pancreatic abscess cx pan-sensitive kleb 5/22. completed Ceftriaxone( 6/5- 6/15) Chloe (5/21-6/5),  Caspo (5/23-5/30), Ampicillin (5/21- 5/27).   PPX: SCDs, SQH  LINES: L rad kalpana (5/31--) // DC: Lsubclav HD Cath (5/31-6/12), RIJ TLC (5/22-5/27), RIJ HD cath (5/22-31), R Ax kalpana(5/12-5/31), [facility]J TLC (5/23-6/1), RIJ TLC (6/1-13)   WOUNDS/DRAINS: right OR CODIE, 2 left OR CODIE, left IR hematoma drain (bilious), left IR pancreatic drain  PT: PT/OT ordered 6/5. Out of bed to stretcher chair  Dispo: SICU   53yMale w/ PMHx of HTN, type A aortic dissection s/p Dacron grafts, AV resuspension in 2013, CAD s/p CABG x 1 SVG to RCA (5/2013 with Dr. Medina), seizure disorder, recent admission 3/20-4/14 for replacement of transverse aortic arch, second stage TEVAR and aorto-axillary bypass, AV replacement (bio 23mm), and CABG x 1 (SVG-RCA). Pt found to have endo leak and taken to OR for TEVAR revision on 5/5, Post op course c/b Klebsiella bacteremia (5/15), resp failure requiring intubation on 5/18, Mucus plugging s/p Bronch 5/19 and PEA arrest. Post operatively pt also found to have hemorrhagic pancreatitis with venu-pancreatic abscess possibly 2* to Depakote therefore s/p IR aspiration of peripancreatic fluid collection and paracentesis on 5/22, IR venu-pancreatic abscess drainage and placement of drainage catheter on 5/24. Pt then transferred from CTICU to SICU for further mgmt of hemorrhagic pancreatitis on 5/25. s/p IR placement of 2 new drainage catheters and catheter exchange on 5/26. LUQ drainage 5/30. OR 5/31 exlap washout & replacement of 3 new JPs and abthera vac with open abdomen. RTOR 6/1, no bleeding, evac of old blood, placement of feeding J-tube. failed extubation s/p trach 6/5    NEURO: Pain control: switch Dilaudid 3q4h to [id number] w/ additional PRN dilaudid, d/c'd fent gtt, Precedex gtt, d/c'd ketamine (6/14). Hx seizures: epilepsy recs appreciated, Cont vimpat. Depakote weaned off due to concerns for drug-related hemorrhagic pancreatitis. Repeat EEG (ordered)  HEENT: Artificial tears, Torticollis - PT/OT following   CV: MAP > 65. s/p PEA arrest on 5/24 night. Echo from 5/19 hyperdynamic LV, SHAJI with LVOTO (gradient 26), normal RV, mild MR, no pulm HTN. C/w ASA 81mg. repeat TTE given persistent sinus tachycardia  PULM: ARDS resolved - off NO, s/p multiple Mucus plug/ Lung collapse s/p bronch x3 (most recently on 5/26) most likely from outward obstruction: Cont rotating pt around the clock. Cont Duonebs, 3% saline. failed extubation 6/3: s/p Trach 6/5: Trach Collar < CPAP 8/PS 5.   GI/FEN: TF Vital 1.0 at 70cc via feeding J-tube, Protonix BID, Hemorrhagic pancreatitis: s/p multiple drain placements and paracentisis by IR team.   : AKF on HD s/p CVVHD, now w/ renal recovery, off of aquaphoresis for fluid overload. condom catheter. Lasix 60mg spot dosing.  HEME: Acute blood loss anemia requiring multiple transfusions  ENDO: mISS  ID: Ertapenem (6/13-6/16)// meropenem (6/9-6/13) vanco x 1 (6/9), caspofungin (6/9-6/12), previous Kleb bacteremia from 5/15 & 5/17, subsequent bld cx negative, PNA w/ bronch cx kleb, enterobacter (zosyn, erta resistant), E faecalis, strep angionosus from 5/19, pancreatic abscess cx pan-sensitive kleb 5/22. completed Ceftriaxone( 6/5- 6/15) Chloe (5/21-6/5),  Caspo (5/23-5/30), Ampicillin (5/21- 5/27).   PPX: SCDs, SQH  LINES: L rad kalpana (5/31--), 5Fr PIV in LUE (6/13- ) // DC: Lsubclav HD Cath (5/31-6/12), RIJ TLC (5/22-5/27), RIJ HD cath (5/22-31), R Ax kalpana(5/12-5/31), LIJ TLC (5/23-6/1), RIJ TLC (6/1-13)   WOUNDS/DRAINS: right OR CODIE, 2 left OR CODIE, left IR hematoma drain (bilious), left IR pancreatic drain  PT: PT/OT ordered 6/5. Out of bed to stretcher chair  Dispo: SICU

## 2023-06-14 NOTE — PROGRESS NOTE ADULT - ASSESSMENT
53 year-old male with PMHx HTN, type A aortic dissection s/p Dacron grafts, AV resuspension in 2013, CAD s/p CABG x 1 SVG to RCA (5/2013 with Dr. Medina), seizure disorder, recent admission 3/20-4/14 for replacement of transverse aortic arch, second stage TEVAR 5/5 with course c/b peripancreatic/RP hemorrhage/hematoma formation s/p multiple washouts on antibiotics. Epilepsy team initially consulted for suspected seizure on 5/12, thought to be provoked. Additional event on 5/14/23 that did not correlate with findings on EEG recordings. Vimpat was increased and he was tapered off Depakote previously due to concerns for possible drug-related hemorrhagic pancreatitis, however, seems less likely and currently pancreatitis is thought to be secondary to cholecystitis. CTH from 5/18 without acute pathology, remain seizure-free and mental status slowly improving.      Recommendations:  - Continue Vimpat 250mg BID   - Continue to hold depakote, especially given thrombocytopenia  - Ativan 2mg IVP for seizures > 2mins  - Keep Mg>2 and K >4.   - Rest of management per primary team  - call for new or worsening neuro symptoms

## 2023-06-14 NOTE — PROGRESS NOTE ADULT - SUBJECTIVE AND OBJECTIVE BOX
Operation / Date:   5/5/23: second stage TEVAR  3/20: aorto-axillary bypass, AV replacement (bio 23mm), and CABG x 1 (SVG-RCA) EF 75%  2013: hx type A dissection and repair, CABG    SUBJECTIVE ASSESSMENT: Patient seen and examined at bedside. Unable to obtain ROS.    Vital Signs Last 24 Hrs  T(C): 37.6 (14 Jun 2023 09:00), Max: 38.2 (13 Jun 2023 17:30)  T(F): 99.7 (14 Jun 2023 09:00), Max: 100.8 (13 Jun 2023 17:30)  HR: 124 (14 Jun 2023 14:00) (110 - 132)  BP: --  BP(mean): --  RR: 24 (14 Jun 2023 14:00) (12 - 26)  SpO2: 98% (14 Jun 2023 14:00) (97% - 100%)    Parameters below as of 14 Jun 2023 14:00  Patient On (Oxygen Delivery Method): tracheostomy collar    O2 Concentration (%): 40  I&O's Detail    13 Jun 2023 07:01  -  14 Jun 2023 07:00  --------------------------------------------------------  IN:    Dexmedetomidine: 86.5 mL    Dexmedetomidine: 64.4 mL    Enteral Tube Flush: 120 mL    IV PiggyBack: 50 mL    IV PiggyBack: 75 mL    IV PiggyBack: 50 mL    IV PiggyBack: 250 mL    Ketamine: 18.2 mL    Ketamine: 49.9 mL    Ketamine: 9.2 mL    TPN (Total Parenteral Nutrition): 460 mL    Vital1.0: 1504 mL  Total IN: 2737.2 mL    OUT:    Bulb (mL): 25 mL    Bulb (mL): 45 mL    Bulb (mL): 540 mL    Drain (mL): 25 mL    Drain (mL): 30 mL    Incontinent per Condom Catheter (mL): 1600 mL    Voided (mL): 620 mL  Total OUT: 2885 mL    Total NET: -147.8 mL    14 Jun 2023 07:01  -  14 Jun 2023 14:59  --------------------------------------------------------  IN:    Dexmedetomidine: 4.6 mL    Dexmedetomidine: 32.2 mL    Enteral Tube Flush: 180 mL    Ketamine: 6.9 mL    Vital1.0: 189 mL  Total IN: 412.7 mL    OUT:    Incontinent per Condom Catheter (mL): 700 mL  Total OUT: 700 mL    Total NET: -287.3 mL    CHEST TUBE:    TIA DRAIN:    EPICARDIAL WIRES:   TIE DOWNS:   KAREN:     PHYSICAL EXAM:  *****    LABS:                        7.4    11.89 )-----------( 441      ( 14 Jun 2023 05:30 )             23.6     06-14    142  |  106  |  39<H>  ----------------------------<  126<H>  3.6   |  29  |  0.67    Ca    8.4      14 Jun 2023 05:30  Phos  3.7     06-14  Mg     1.9     06-14    TPro  6.1  /  Alb  2.3<L>  /  TBili  0.7  /  DBili  0.3  /  AST  20  /  ALT  15  /  AlkPhos  147<H>  06-14    MEDICATIONS  (STANDING):  acetaminophen   Oral Liquid .. 975 milliGRAM(s) Enteral Tube every 6 hours  albuterol/ipratropium for Nebulization 3 milliLiter(s) Nebulizer every 6 hours  artificial  tears Solution 1 Drop(s) Both EYES two times a day  aspirin  chewable 81 milliGRAM(s) Enteral Tube every 24 hours  chlorhexidine 0.12% Liquid 15 milliLiter(s) Oral Mucosa every 12 hours  chlorhexidine 2% Cloths 1 Application(s) Topical daily  dexMEDEtomidine Infusion 0.2 MICROgram(s)/kG/Hr (4.55 mL/Hr) IV Continuous <Continuous>  dextrose 5%. 1000 milliLiter(s) (100 mL/Hr) IV Continuous <Continuous>  dextrose 50% Injectable 25 Gram(s) IV Push once  ertapenem  IVPB 1000 milliGRAM(s) IV Intermittent every 24 hours  glucagon  Injectable 1 milliGRAM(s) IntraMuscular once  heparin   Injectable 5000 Unit(s) SubCutaneous every 8 hours  insulin lispro (ADMELOG) corrective regimen sliding scale   SubCutaneous every 6 hours  lacosamide IVPB 250 milliGRAM(s) IV Intermittent every 12 hours  multivitamin/minerals/iron Oral Solution (CENTRUM) 15 milliLiter(s) Oral daily  oxyCODONE    Solution 20 milliGRAM(s) Oral once  oxyCODONE    Solution 40 milliGRAM(s) Oral every 4 hours  pantoprazole  Injectable 40 milliGRAM(s) IV Push daily  sodium chloride 3%  Inhalation 4 milliLiter(s) Inhalation every 6 hours    MEDICATIONS  (PRN):  dextrose Oral Gel 15 Gram(s) Oral once PRN Blood Glucose LESS THAN 70 milliGRAM(s)/deciliter  HYDROmorphone  Injectable 1 milliGRAM(s) IV Push every 4 hours PRN breakthrough pain  sodium chloride 0.9% lock flush 10 milliLiter(s) IV Push every 1 hour PRN Pre/post blood products, medications, blood draw, and to maintain line patency    RADIOLOGY & ADDITIONAL TESTS:  < from: Xray Chest 1 View- PORTABLE-Routine (Xray Chest 1 View- PORTABLE-Routine in AM.) (06.14.23 @ 06:58) >  IMPRESSION:    Right venous catheter has been removed since priorexam 6/13/2023. No   other change. Postop changes and tracheostomy again noted. Right lung   clear. Left mid upper lung clear. Left lung base not well visualized   secondary to cardiac superimposition. No pneumothorax.         Operation / Date:   5/5/23: second stage TEVAR  3/20: aorto-axillary bypass, AV replacement (bio 23mm), and CABG x 1 (SVG-RCA) EF 75%  2013: hx type A dissection and repair, CABG    SUBJECTIVE ASSESSMENT: Patient seen and examined at bedside. Unable to obtain ROS.    Vital Signs Last 24 Hrs  T(C): 37.6 (14 Jun 2023 09:00), Max: 38.2 (13 Jun 2023 17:30)  T(F): 99.7 (14 Jun 2023 09:00), Max: 100.8 (13 Jun 2023 17:30)  HR: 124 (14 Jun 2023 14:00) (110 - 132)  BP: --  BP(mean): --  RR: 24 (14 Jun 2023 14:00) (12 - 26)  SpO2: 98% (14 Jun 2023 14:00) (97% - 100%)    Parameters below as of 14 Jun 2023 14:00  Patient On (Oxygen Delivery Method): tracheostomy collar    O2 Concentration (%): 40  I&O's Detail    13 Jun 2023 07:01  -  14 Jun 2023 07:00  --------------------------------------------------------  IN:    Dexmedetomidine: 86.5 mL    Dexmedetomidine: 64.4 mL    Enteral Tube Flush: 120 mL    IV PiggyBack: 50 mL    IV PiggyBack: 75 mL    IV PiggyBack: 50 mL    IV PiggyBack: 250 mL    Ketamine: 18.2 mL    Ketamine: 49.9 mL    Ketamine: 9.2 mL    TPN (Total Parenteral Nutrition): 460 mL    Vital1.0: 1504 mL  Total IN: 2737.2 mL    OUT:    Bulb (mL): 25 mL    Bulb (mL): 45 mL    Bulb (mL): 540 mL    Drain (mL): 25 mL    Drain (mL): 30 mL    Incontinent per Condom Catheter (mL): 1600 mL    Voided (mL): 620 mL  Total OUT: 2885 mL    Total NET: -147.8 mL    14 Jun 2023 07:01  -  14 Jun 2023 14:59  --------------------------------------------------------  IN:    Dexmedetomidine: 4.6 mL    Dexmedetomidine: 32.2 mL    Enteral Tube Flush: 180 mL    Ketamine: 6.9 mL    Vital1.0: 189 mL  Total IN: 412.7 mL    OUT:    Incontinent per Condom Catheter (mL): 700 mL  Total OUT: 700 mL    Total NET: -287.3 mL    CHEST TUBE:  none  TIA DRAIN:  5 surgical drains in place  EPICARDIAL WIRES: none  TIE DOWNS: none  JONES: in place    PHYSICAL EXAM:  General: pt trached, no acute distress, but some visible discomfort  Neurological: AOx3. Motor skills grossly intact  Cardiovascular: Normal S1/S2. Regular rate/rhythm. No murmurs  Respiratory: Lungs CTA bilaterally. No wheezing or rales  Gastrointestinal: +BS in all 4 quadrants. Non-distended. Soft. Non-tender.5 surgical drains in place.   Extremities: Strength 5/5 b/l upper/lower extremities. Sensation grossly intact upper/lower extremities. No edema. No calf tenderness.  Vascular: Radial 2+bilaterally, DP 2+ b/l  Incision Sites: MSI incision well healed, laporotomy incision with staples still in place.      LABS:                        7.4    11.89 )-----------( 441      ( 14 Jun 2023 05:30 )             23.6     06-14    142  |  106  |  39<H>  ----------------------------<  126<H>  3.6   |  29  |  0.67    Ca    8.4      14 Jun 2023 05:30  Phos  3.7     06-14  Mg     1.9     06-14    TPro  6.1  /  Alb  2.3<L>  /  TBili  0.7  /  DBili  0.3  /  AST  20  /  ALT  15  /  AlkPhos  147<H>  06-14    MEDICATIONS  (STANDING):  acetaminophen   Oral Liquid .. 975 milliGRAM(s) Enteral Tube every 6 hours  albuterol/ipratropium for Nebulization 3 milliLiter(s) Nebulizer every 6 hours  artificial  tears Solution 1 Drop(s) Both EYES two times a day  aspirin  chewable 81 milliGRAM(s) Enteral Tube every 24 hours  chlorhexidine 0.12% Liquid 15 milliLiter(s) Oral Mucosa every 12 hours  chlorhexidine 2% Cloths 1 Application(s) Topical daily  dexMEDEtomidine Infusion 0.2 MICROgram(s)/kG/Hr (4.55 mL/Hr) IV Continuous <Continuous>  dextrose 5%. 1000 milliLiter(s) (100 mL/Hr) IV Continuous <Continuous>  dextrose 50% Injectable 25 Gram(s) IV Push once  ertapenem  IVPB 1000 milliGRAM(s) IV Intermittent every 24 hours  glucagon  Injectable 1 milliGRAM(s) IntraMuscular once  heparin   Injectable 5000 Unit(s) SubCutaneous every 8 hours  insulin lispro (ADMELOG) corrective regimen sliding scale   SubCutaneous every 6 hours  lacosamide IVPB 250 milliGRAM(s) IV Intermittent every 12 hours  multivitamin/minerals/iron Oral Solution (CENTRUM) 15 milliLiter(s) Oral daily  oxyCODONE    Solution 20 milliGRAM(s) Oral once  oxyCODONE    Solution 40 milliGRAM(s) Oral every 4 hours  pantoprazole  Injectable 40 milliGRAM(s) IV Push daily  sodium chloride 3%  Inhalation 4 milliLiter(s) Inhalation every 6 hours    MEDICATIONS  (PRN):  dextrose Oral Gel 15 Gram(s) Oral once PRN Blood Glucose LESS THAN 70 milliGRAM(s)/deciliter  HYDROmorphone  Injectable 1 milliGRAM(s) IV Push every 4 hours PRN breakthrough pain  sodium chloride 0.9% lock flush 10 milliLiter(s) IV Push every 1 hour PRN Pre/post blood products, medications, blood draw, and to maintain line patency    RADIOLOGY & ADDITIONAL TESTS:  < from: Xray Chest 1 View- PORTABLE-Routine (Xray Chest 1 View- PORTABLE-Routine in AM.) (06.14.23 @ 06:58) >  IMPRESSION:    Right venous catheter has been removed since priorexam 6/13/2023. No   other change. Postop changes and tracheostomy again noted. Right lung   clear. Left mid upper lung clear. Left lung base not well visualized   secondary to cardiac superimposition. No pneumothorax.

## 2023-06-14 NOTE — PROGRESS NOTE ADULT - ASSESSMENT
53 year old male with PMH of HTN, Type A Aortic dissection s/p Dacron grafts, AV resuspension (2013), CAD s/p CABG x 1 SVG to RCA (2013, Dr. Medina), seizure disorder, with recent admit (Mar/Apr) for replacement of transverse aortic arch, second stage TEVAR and aoto-axillary bypass, AV replacement, and CABG x1. Post op course c/b shock, TREY requiring CVVHD, dialysis. Readmitted on 4/27 ([facility] ED) for syncope at home where imaging revealed graft endoleak and pancreatitis. Patient taken back to OR 5/5 for TEVAR, with post op course now complicated by Klebseilla bacteremia (5/15), respiratory failure requiring intubation (5/18), and bronch and PEA arrest (5/19), with respiratory cultures revealing Enterobacter, E. Faecalis and Strep angionosus. Surgery reconsulted re: active hemorrhagic pancreatitis on CT (5/13), with subsequent IR aspiration of peripancreatic fluid collection (15cc, Kleb) and paracentesis - 4L (5/22). Patient started on CVVHD 5/22. Transferred to SICU (5/25), with catheter exchange and two additional drains placed on 5/26 in IR and exlap, wash out and new drain placement on 5/31. The patient returned to the OR 6/1 for evacuation of blood and placement of J tube. Patient failed extubation on 6/3 and received trach on 6/5. Noted to be febrile overnight 6/7, 6/8, 6/9. CT Ch/Ab/Pelvis overnight 6/7 with stable abdominal collections and atelectasis of LLL.     Plan:    -Wean off vent  -OOB  -Cont tube feeds to goal  -F/u bronch cx  -Flush both hematoma and retroperitoneal drains  -Lasix prn  -Pain control  -Rest of care per SICU  -Surgery team 1 following

## 2023-06-14 NOTE — PROGRESS NOTE ADULT - SUBJECTIVE AND OBJECTIVE BOX
ON: Tachycardic/Tachypneic. Placed back on AC overnight to rest. Agitation continued despite standing/breakthrough Dilaudid. Fentanyl 25mcg givenx1 with short term improvement. Continued pain/diaphoretic (abdominal exam unchanged). Ketamine drip restarted at .05. CPAP @ 0500.   6/13: Tube feed to goal. DC TPN. Lasix 80. POCUS with primarily A line pattern. Decrease ketamine. Enterobacter in bronch erta sensitive. OUT OF BED TO RECLINER. CVP 6. Switch to ertapenem per ID. Add fiber packs for loose stool. 5Fr PIV in LUE, DC TLC. D/c'd ketamine. Precedex capped at 0.2.      SUBJECTIVE: Patient seen and examined bedside; looks uncomfortable, nodding head when asked if in pain.    aspirin  chewable 81 milliGRAM(s) Enteral Tube every 24 hours  ertapenem  IVPB 1000 milliGRAM(s) IV Intermittent every 24 hours  heparin   Injectable 5000 Unit(s) SubCutaneous every 8 hours      Vital Signs Last 24 Hrs  T(C): 37.9 (14 Jun 2023 05:40), Max: 38.2 (13 Jun 2023 17:30)  T(F): 100.2 (14 Jun 2023 05:40), Max: 100.8 (13 Jun 2023 17:30)  HR: 126 (14 Jun 2023 05:00) (103 - 132)  BP: --  BP(mean): --  RR: 24 (14 Jun 2023 05:00) (10 - 46)  SpO2: 100% (14 Jun 2023 05:00) (97% - 100%)    Parameters below as of 14 Jun 2023 05:00  Patient On (Oxygen Delivery Method): ventilator, CPAP      I&O's Detail    12 Jun 2023 07:01  -  13 Jun 2023 07:00  --------------------------------------------------------  IN:    Dexmedetomidine: 115.9 mL    Dexmedetomidine: 213.9 mL    Enteral Tube Flush: 60 mL    FentaNYL: 5.4 mL    FentaNYL: 2.7 mL    IV PiggyBack: 50 mL    IV PiggyBack: 50 mL    Ketamine: 218.4 mL    TPN (Total Parenteral Nutrition): 951 mL    Vital1.0: 1205 mL  Total IN: 2872.3 mL    OUT:    Bulb (mL): 30 mL    Bulb (mL): 15 mL    Bulb (mL): 280 mL    Drain (mL): 40 mL    Drain (mL): 15 mL    Incontinent per Condom Catheter (mL): 2170 mL  Total OUT: 2550 mL    Total NET: 322.3 mL      13 Jun 2023 07:01  -  14 Jun 2023 06:39  --------------------------------------------------------  IN:    Dexmedetomidine: 86.5 mL    Dexmedetomidine: 55.2 mL    Enteral Tube Flush: 120 mL    IV PiggyBack: 50 mL    IV PiggyBack: 75 mL    IV PiggyBack: 50 mL    IV PiggyBack: 250 mL    Ketamine: 18.2 mL    Ketamine: 49.9 mL    Ketamine: 4.6 mL    TPN (Total Parenteral Nutrition): 460 mL    Vital1.0: 1378 mL  Total IN: 2597.4 mL    OUT:    Bulb (mL): 25 mL    Bulb (mL): 45 mL    Bulb (mL): 540 mL    Drain (mL): 25 mL    Drain (mL): 30 mL    Incontinent per Condom Catheter (mL): 1600 mL    Voided (mL): 540 mL  Total OUT: 2805 mL    Total NET: -207.6 mL      PE:    General: NAD, resting uncomfortably in bed  C/V: NSR, s1 s2, tachycardic  Pulm: Trach to vent, CPAP, nonlabored breathing, no respiratory distress  Abd: Soft, ND, mildly tender around drains, staples in place, drains unchanged  Extrem: [facility]        LABS:                        7.4    11.89 )-----------( 441      ( 14 Jun 2023 05:30 )             23.6     06-14    142  |  106  |  39<H>  ----------------------------<  126<H>  3.6   |  29  |  0.67    Ca    8.4      14 Jun 2023 05:30  Phos  3.7     06-14  Mg     1.9     06-14    TPro  6.1  /  Alb  2.3<L>  /  TBili  0.7  /  DBili  0.3  /  AST  20  /  ALT  15  /  AlkPhos  147<H>  06-14          RADIOLOGY & ADDITIONAL STUDIES:

## 2023-06-15 LAB
ALBUMIN SERPL ELPH-MCNC: 2.4 G/DL — LOW (ref 3.3–5)
ALP SERPL-CCNC: 148 U/L — HIGH (ref 40–120)
ALT FLD-CCNC: 20 U/L — SIGNIFICANT CHANGE UP (ref 10–45)
ANION GAP SERPL CALC-SCNC: 8 MMOL/L — SIGNIFICANT CHANGE UP (ref 5–17)
AST SERPL-CCNC: 25 U/L — SIGNIFICANT CHANGE UP (ref 10–40)
BILIRUB DIRECT SERPL-MCNC: 0.3 MG/DL — SIGNIFICANT CHANGE UP (ref 0–0.3)
BILIRUB INDIRECT FLD-MCNC: 0.3 MG/DL — SIGNIFICANT CHANGE UP (ref 0.2–1)
BILIRUB SERPL-MCNC: 0.6 MG/DL — SIGNIFICANT CHANGE UP (ref 0.2–1.2)
BLD GP AB SCN SERPL QL: NEGATIVE — SIGNIFICANT CHANGE UP
BUN SERPL-MCNC: 34 MG/DL — HIGH (ref 7–23)
CALCIUM SERPL-MCNC: 8.7 MG/DL — SIGNIFICANT CHANGE UP (ref 8.4–10.5)
CHLORIDE SERPL-SCNC: 109 MMOL/L — HIGH (ref 96–108)
CO2 SERPL-SCNC: 29 MMOL/L — SIGNIFICANT CHANGE UP (ref 22–31)
CREAT SERPL-MCNC: 0.72 MG/DL — SIGNIFICANT CHANGE UP (ref 0.5–1.3)
EGFR: 109 ML/MIN/1.73M2 — SIGNIFICANT CHANGE UP
GLUCOSE BLDC GLUCOMTR-MCNC: 109 MG/DL — HIGH (ref 70–99)
GLUCOSE BLDC GLUCOMTR-MCNC: 113 MG/DL — HIGH (ref 70–99)
GLUCOSE BLDC GLUCOMTR-MCNC: 91 MG/DL — SIGNIFICANT CHANGE UP (ref 70–99)
GLUCOSE SERPL-MCNC: 103 MG/DL — HIGH (ref 70–99)
HCT VFR BLD CALC: 22.6 % — LOW (ref 39–50)
HGB BLD-MCNC: 7.1 G/DL — LOW (ref 13–17)
MAGNESIUM SERPL-MCNC: 2 MG/DL — SIGNIFICANT CHANGE UP (ref 1.6–2.6)
MCHC RBC-ENTMCNC: 29.7 PG — SIGNIFICANT CHANGE UP (ref 27–34)
MCHC RBC-ENTMCNC: 31.4 GM/DL — LOW (ref 32–36)
MCV RBC AUTO: 94.6 FL — SIGNIFICANT CHANGE UP (ref 80–100)
NRBC # BLD: 0 /100 WBCS — SIGNIFICANT CHANGE UP (ref 0–0)
PHOSPHATE SERPL-MCNC: 4.1 MG/DL — SIGNIFICANT CHANGE UP (ref 2.5–4.5)
PLATELET # BLD AUTO: 518 K/UL — HIGH (ref 150–400)
POTASSIUM SERPL-MCNC: 4.4 MMOL/L — SIGNIFICANT CHANGE UP (ref 3.5–5.3)
POTASSIUM SERPL-SCNC: 4.4 MMOL/L — SIGNIFICANT CHANGE UP (ref 3.5–5.3)
PROT SERPL-MCNC: 6 G/DL — SIGNIFICANT CHANGE UP (ref 6–8.3)
RBC # BLD: 2.39 M/UL — LOW (ref 4.2–5.8)
RBC # FLD: 16.6 % — HIGH (ref 10.3–14.5)
RH IG SCN BLD-IMP: POSITIVE — SIGNIFICANT CHANGE UP
SODIUM SERPL-SCNC: 146 MMOL/L — HIGH (ref 135–145)
WBC # BLD: 11.68 K/UL — HIGH (ref 3.8–10.5)
WBC # FLD AUTO: 11.68 K/UL — HIGH (ref 3.8–10.5)

## 2023-06-15 PROCEDURE — 99233 SBSQ HOSP IP/OBS HIGH 50: CPT | Mod: GC

## 2023-06-15 PROCEDURE — 36000 PLACE NEEDLE IN VEIN: CPT

## 2023-06-15 PROCEDURE — 76937 US GUIDE VASCULAR ACCESS: CPT | Mod: 26

## 2023-06-15 RX ORDER — LOPERAMIDE HCL 2 MG
2 TABLET ORAL EVERY 12 HOURS
Refills: 0 | Status: DISCONTINUED | OUTPATIENT
Start: 2023-06-15 | End: 2023-06-18

## 2023-06-15 RX ORDER — CHLOROTHIAZIDE 500 MG
500 TABLET ORAL EVERY 24 HOURS
Refills: 0 | Status: DISCONTINUED | OUTPATIENT
Start: 2023-06-15 | End: 2023-06-16

## 2023-06-15 RX ORDER — FUROSEMIDE 40 MG
80 TABLET ORAL DAILY
Refills: 0 | Status: DISCONTINUED | OUTPATIENT
Start: 2023-06-15 | End: 2023-06-16

## 2023-06-15 RX ADMIN — Medication 1 DROP(S): at 16:18

## 2023-06-15 RX ADMIN — Medication 3 MILLILITER(S): at 09:22

## 2023-06-15 RX ADMIN — ERTAPENEM SODIUM 120 MILLIGRAM(S): 1 INJECTION, POWDER, LYOPHILIZED, FOR SOLUTION INTRAMUSCULAR; INTRAVENOUS at 22:16

## 2023-06-15 RX ADMIN — HEPARIN SODIUM 5000 UNIT(S): 5000 INJECTION INTRAVENOUS; SUBCUTANEOUS at 13:02

## 2023-06-15 RX ADMIN — HEPARIN SODIUM 5000 UNIT(S): 5000 INJECTION INTRAVENOUS; SUBCUTANEOUS at 21:14

## 2023-06-15 RX ADMIN — HYDROMORPHONE HYDROCHLORIDE 1 MILLIGRAM(S): 2 INJECTION INTRAMUSCULAR; INTRAVENOUS; SUBCUTANEOUS at 18:50

## 2023-06-15 RX ADMIN — Medication 3 MILLILITER(S): at 15:51

## 2023-06-15 RX ADMIN — HEPARIN SODIUM 5000 UNIT(S): 5000 INJECTION INTRAVENOUS; SUBCUTANEOUS at 05:36

## 2023-06-15 RX ADMIN — HYDROMORPHONE HYDROCHLORIDE 1 MILLIGRAM(S): 2 INJECTION INTRAMUSCULAR; INTRAVENOUS; SUBCUTANEOUS at 08:20

## 2023-06-15 RX ADMIN — OXYCODONE HYDROCHLORIDE 40 MILLIGRAM(S): 5 TABLET ORAL at 21:21

## 2023-06-15 RX ADMIN — HYDROMORPHONE HYDROCHLORIDE 1 MILLIGRAM(S): 2 INJECTION INTRAMUSCULAR; INTRAVENOUS; SUBCUTANEOUS at 14:43

## 2023-06-15 RX ADMIN — Medication 975 MILLIGRAM(S): at 04:41

## 2023-06-15 RX ADMIN — Medication 2 MILLIGRAM(S): at 12:59

## 2023-06-15 RX ADMIN — Medication 975 MILLIGRAM(S): at 21:12

## 2023-06-15 RX ADMIN — Medication 3 MILLILITER(S): at 07:16

## 2023-06-15 RX ADMIN — HYDROMORPHONE HYDROCHLORIDE 1 MILLIGRAM(S): 2 INJECTION INTRAMUSCULAR; INTRAVENOUS; SUBCUTANEOUS at 08:08

## 2023-06-15 RX ADMIN — Medication 100 MILLIGRAM(S): at 10:54

## 2023-06-15 RX ADMIN — HYDROMORPHONE HYDROCHLORIDE 1 MILLIGRAM(S): 2 INJECTION INTRAMUSCULAR; INTRAVENOUS; SUBCUTANEOUS at 22:50

## 2023-06-15 RX ADMIN — OXYCODONE HYDROCHLORIDE 40 MILLIGRAM(S): 5 TABLET ORAL at 09:16

## 2023-06-15 RX ADMIN — OXYCODONE HYDROCHLORIDE 40 MILLIGRAM(S): 5 TABLET ORAL at 13:00

## 2023-06-15 RX ADMIN — HYDROMORPHONE HYDROCHLORIDE 1 MILLIGRAM(S): 2 INJECTION INTRAMUSCULAR; INTRAVENOUS; SUBCUTANEOUS at 19:05

## 2023-06-15 RX ADMIN — CHLORHEXIDINE GLUCONATE 15 MILLILITER(S): 213 SOLUTION TOPICAL at 16:17

## 2023-06-15 RX ADMIN — LACOSAMIDE 150 MILLIGRAM(S): 50 TABLET ORAL at 18:33

## 2023-06-15 RX ADMIN — HYDROMORPHONE HYDROCHLORIDE 1 MILLIGRAM(S): 2 INJECTION INTRAMUSCULAR; INTRAVENOUS; SUBCUTANEOUS at 00:39

## 2023-06-15 RX ADMIN — Medication 80 MILLIGRAM(S): at 18:41

## 2023-06-15 RX ADMIN — SODIUM CHLORIDE 4 MILLILITER(S): 9 INJECTION INTRAMUSCULAR; INTRAVENOUS; SUBCUTANEOUS at 09:23

## 2023-06-15 RX ADMIN — HYDROMORPHONE HYDROCHLORIDE 1 MILLIGRAM(S): 2 INJECTION INTRAMUSCULAR; INTRAVENOUS; SUBCUTANEOUS at 14:57

## 2023-06-15 RX ADMIN — Medication 975 MILLIGRAM(S): at 10:54

## 2023-06-15 RX ADMIN — DEXMEDETOMIDINE HYDROCHLORIDE IN 0.9% SODIUM CHLORIDE 4.55 MICROGRAM(S)/KG/HR: 4 INJECTION INTRAVENOUS at 07:43

## 2023-06-15 RX ADMIN — SODIUM CHLORIDE 4 MILLILITER(S): 9 INJECTION INTRAMUSCULAR; INTRAVENOUS; SUBCUTANEOUS at 15:51

## 2023-06-15 RX ADMIN — Medication 3 MILLILITER(S): at 21:22

## 2023-06-15 RX ADMIN — HYDROMORPHONE HYDROCHLORIDE 1 MILLIGRAM(S): 2 INJECTION INTRAMUSCULAR; INTRAVENOUS; SUBCUTANEOUS at 22:54

## 2023-06-15 RX ADMIN — Medication 975 MILLIGRAM(S): at 21:21

## 2023-06-15 RX ADMIN — OXYCODONE HYDROCHLORIDE 40 MILLIGRAM(S): 5 TABLET ORAL at 02:07

## 2023-06-15 RX ADMIN — SODIUM CHLORIDE 4 MILLILITER(S): 9 INJECTION INTRAMUSCULAR; INTRAVENOUS; SUBCUTANEOUS at 07:17

## 2023-06-15 RX ADMIN — Medication 15 MILLILITER(S): at 17:52

## 2023-06-15 RX ADMIN — HYDROMORPHONE HYDROCHLORIDE 1 MILLIGRAM(S): 2 INJECTION INTRAMUSCULAR; INTRAVENOUS; SUBCUTANEOUS at 00:00

## 2023-06-15 RX ADMIN — Medication 975 MILLIGRAM(S): at 16:15

## 2023-06-15 RX ADMIN — OXYCODONE HYDROCHLORIDE 40 MILLIGRAM(S): 5 TABLET ORAL at 05:36

## 2023-06-15 RX ADMIN — OXYCODONE HYDROCHLORIDE 40 MILLIGRAM(S): 5 TABLET ORAL at 02:47

## 2023-06-15 RX ADMIN — SODIUM CHLORIDE 4 MILLILITER(S): 9 INJECTION INTRAMUSCULAR; INTRAVENOUS; SUBCUTANEOUS at 21:22

## 2023-06-15 RX ADMIN — OXYCODONE HYDROCHLORIDE 40 MILLIGRAM(S): 5 TABLET ORAL at 21:02

## 2023-06-15 RX ADMIN — HYDROMORPHONE HYDROCHLORIDE 1 MILLIGRAM(S): 2 INJECTION INTRAMUSCULAR; INTRAVENOUS; SUBCUTANEOUS at 00:54

## 2023-06-15 RX ADMIN — PANTOPRAZOLE SODIUM 40 MILLIGRAM(S): 20 TABLET, DELAYED RELEASE ORAL at 12:13

## 2023-06-15 RX ADMIN — OXYCODONE HYDROCHLORIDE 40 MILLIGRAM(S): 5 TABLET ORAL at 17:00

## 2023-06-15 RX ADMIN — CHLORHEXIDINE GLUCONATE 15 MILLILITER(S): 213 SOLUTION TOPICAL at 05:36

## 2023-06-15 RX ADMIN — LACOSAMIDE 150 MILLIGRAM(S): 50 TABLET ORAL at 06:17

## 2023-06-15 RX ADMIN — Medication 81 MILLIGRAM(S): at 12:13

## 2023-06-15 RX ADMIN — Medication 975 MILLIGRAM(S): at 05:10

## 2023-06-15 RX ADMIN — Medication 1 DROP(S): at 06:18

## 2023-06-15 RX ADMIN — OXYCODONE HYDROCHLORIDE 40 MILLIGRAM(S): 5 TABLET ORAL at 06:00

## 2023-06-15 NOTE — PROGRESS NOTE ADULT - ATTENDING COMMENTS
h/o aortic dissection, AVR, seizure disorder now s/p TEVAR c/b hemorrhagic pancreatitis, Klebsiella bacteremia/peritonitis/pna, resolved ATN, metabolic encephalopathy  physical as above  DC precedex and continue oxycodone  tolerating vital 1.0 feeds, add loperamide for diarrhea  continue ertapenem  tolerating trach collar  continue diuresis, add chlorothiazide for the hypernatremia and follow  decision making of high complexity

## 2023-06-15 NOTE — PROGRESS NOTE ADULT - SUBJECTIVE AND OBJECTIVE BOX
INTERVAL HPI/OVERNIGHT EVENTS:  ON: Left upper arm IV infiltrated, new US guided placed in LOPEZ. Tolerating trach collar. No additional prn dilaudid pushes needed, net negative 1 Liter.     SUBJECTIVE:  Patient seen and examined by chief resident and team on AM rounds. Patient appears comfortable, on trach collar. Patient able to respond to verbal commands, on precedex 0.2. His tachycardia improved overnight to low 100s after 2nd dose of lasix 80mg given yesterday afternoon. Patient voiding adequately via condom catheter, 1000cc overnight. Patient still appears total body fluid overloaded, with some mild improvement in b/l lower extremity edema. Sodium this AM was 146 (142), will diurese with chlorthalidone 500mg IV to allow for serum sodium downtrend. Stooled multiple times overnight (undocumented), will keep tube feeds, Vital 1.0 @70cc/hr.     MEDICATIONS  (STANDING):  acetaminophen   Oral Liquid .. 975 milliGRAM(s) Enteral Tube every 6 hours  albuterol/ipratropium for Nebulization 3 milliLiter(s) Nebulizer every 6 hours  artificial  tears Solution 1 Drop(s) Both EYES two times a day  aspirin  chewable 81 milliGRAM(s) Enteral Tube every 24 hours  chlorhexidine 0.12% Liquid 15 milliLiter(s) Oral Mucosa every 12 hours  chlorhexidine 2% Cloths 1 Application(s) Topical daily  chlorothiazide IVPB 500 milliGRAM(s) IV Intermittent every 24 hours  dexMEDEtomidine Infusion 0.2 MICROgram(s)/kG/Hr (4.55 mL/Hr) IV Continuous <Continuous>  dextrose 5%. 1000 milliLiter(s) (100 mL/Hr) IV Continuous <Continuous>  dextrose 50% Injectable 25 Gram(s) IV Push once  ertapenem  IVPB 1000 milliGRAM(s) IV Intermittent every 24 hours  glucagon  Injectable 1 milliGRAM(s) IntraMuscular once  heparin   Injectable 5000 Unit(s) SubCutaneous every 8 hours  insulin lispro (ADMELOG) corrective regimen sliding scale   SubCutaneous every 6 hours  lacosamide IVPB 250 milliGRAM(s) IV Intermittent every 12 hours  loperamide Liquid 2 milliGRAM(s) Oral every 12 hours  multivitamin/minerals/iron Oral Solution (CENTRUM) 15 milliLiter(s) Oral daily  oxyCODONE    Solution 40 milliGRAM(s) Oral every 4 hours  pantoprazole  Injectable 40 milliGRAM(s) IV Push daily  sodium chloride 3%  Inhalation 4 milliLiter(s) Inhalation every 6 hours    MEDICATIONS  (PRN):  dextrose Oral Gel 15 Gram(s) Oral once PRN Blood Glucose LESS THAN 70 milliGRAM(s)/deciliter  HYDROmorphone  Injectable 1 milliGRAM(s) IV Push every 4 hours PRN breakthrough pain  sodium chloride 0.9% lock flush 10 milliLiter(s) IV Push every 1 hour PRN Pre/post blood products, medications, blood draw, and to maintain line patency      Vital Signs Last 24 Hrs  T(C): 37.4 (15 Vazquez 2023 08:53), Max: 37.5 (14 Jun 2023 15:00)  T(F): 99.3 (15 Vazquez 2023 08:53), Max: 99.5 (14 Jun 2023 15:00)  HR: 121 (15 Vazquez 2023 12:01) (91 - 135)  BP: --  BP(mean): --  RR: 20 (15 Vazquez 2023 12:00) (11 - 28)  SpO2: 100% (15 Vazquez 2023 12:01) (96% - 100%)    Parameters below as of 15 Vazquez 2023 12:01  Patient On (Oxygen Delivery Method): tracheostomy collar  O2 Flow (L/min): 10  O2 Concentration (%): 40    PHYSICAL EXAM:  GEN: On precedex gtt 0.2 for sedation. NAD, resting comfortably w/ trach collar  EYES: PERRLA and symmetric, EOMI, No conjunctival or scleral injection, non-icteric  ENMT: Oral mucosa with moist membranes.   RESP: Tracheostomy in place, with small pressure injury present directly inferior to tracheostomy. On trach collar currently, tolerating and satting 100%. No respiratory distress, no use of accessory muscles; Lungs with some scattered ronchi b/l, with significant improvement from prior.   CV: RRR, +S1S2, mitral valve murmur noted at cardiac apex.   GI: soft, mild ttp around incisions. Midline incision c/d/i. Multiple abdominal drains in place (see below). J tube remains in place, w/ vital 1.0 tube feeds running @70cc/hr, without surrounding erythema at insertion site. Rectal tube located around but external to anus to collect stool.   ; Condom catheter in place with clear, yellow urine.   Groin: R. groin with known fluid collection, no overlying skin changes or erythema. Left upper inner thigh with area of fluctuance, without overlying skin changes or eythema.   SKIN: No rashes or ulcers noted.  NEURO: Arousable with speech, able to follow commands. Able to squeeze fingers. Motor and sensation grossly intact in all 4 extremities. RASS 0 to -1 on precedex.   PSYCH: able to follow commands.  Drains: L. hemidiaphragm YANET bilious, L. abdominal hematoma drain bilious, L. peripancreatic drain ss (more serous), L. retroperitoneal yanet ss (sang >serous), R. pelvic yanet ss.   LINES: 5Fr PIV in RODO CARTWRIGHTline       I&O's Detail    14 Jun 2023 07:01  -  15 Vazquez 2023 07:00  --------------------------------------------------------  IN:    Dexmedetomidine: 4.6 mL    Dexmedetomidine: 105.8 mL    Enteral Tube Flush: 530 mL    IV PiggyBack: 50 mL    Ketamine: 6.9 mL    Vital1.0: 1232 mL  Total IN: 1929.3 mL    OUT:    Bulb (mL): 35 mL    Bulb (mL): 50 mL    Bulb (mL): 75 mL    Drain (mL): 15 mL    Drain (mL): 340 mL    Incontinent per Condom Catheter (mL): 1350 mL    Voided (mL): 1000 mL  Total OUT: 2865 mL    Total NET: -935.7 mL      15 Vazquez 2023 07:01  -  15 Vazquez 2023 13:11  --------------------------------------------------------  IN:    Dexmedetomidine: 18.4 mL    Enteral Tube Flush: 120 mL    Instillation (mL): 300 mL    Vital1.0: 280 mL  Total IN: 718.4 mL    OUT:    Bulb (mL): 230 mL    Voided (mL): 50 mL  Total OUT: 280 mL    Total NET: 438.4 mL          LABS:                        7.1    11.68 )-----------( 518      ( 15 Vazquez 2023 05:24 )             22.6     06-15    146<H>  |  109<H>  |  34<H>  ----------------------------<  103<H>  4.4   |  29  |  0.72    Ca    8.7      15 Vazquez 2023 05:24  Phos  4.1     06-15  Mg     2.0     06-15    TPro  6.0  /  Alb  2.4<L>  /  TBili  0.6  /  DBili  0.3  /  AST  25  /  ALT  20  /  AlkPhos  148<H>  06-15          RADIOLOGY & ADDITIONAL STUDIES:

## 2023-06-15 NOTE — PROGRESS NOTE ADULT - SUBJECTIVE AND OBJECTIVE BOX
1. 16 Fr teal LUQ peripancreatic abscess "Left Pancreatic CODIE Drain" placed on 5/24 (attached to gravity bag):   -15 cc serosanguinous output in 24 hrs. 30 cc in previous 24 hrs. Daily irrigation with 250 cc NS per primary team.     2. 16 Fr teal LUQ hematoma "Left Abdominal Hematoma CODIE Drain" placed on 5/25 and upsized on 5/26:   -340 cc serosanguinous output in 24 hrs. 25 cc in previous 24 hrs. Flushed easily with 3cc sterile NS. Per surgery resident, this collection is communicating with and draining the LUQ peripancreatic abscess "Left Pancreatic CODIE Drain" (which is being flushed with 250 cc of NS by primary team).

## 2023-06-15 NOTE — PROGRESS NOTE ADULT - ASSESSMENT
53yMale w/ PMHx of HTN, type A aortic dissection s/p Dacron grafts, AV resuspension in 2013, CAD s/p CABG x 1 SVG to RCA (5/2013 with Dr. Medina), seizure disorder, recent admission 3/20-4/14 for replacement of transverse aortic arch, second stage TEVAR and aorto-axillary bypass, AV replacement (bio 23mm), and CABG x 1 (SVG-RCA). Pt found to have endo leak and taken to OR for TEVAR revision on 5/5, Post op course c/b Klebsiella bacteremia (5/15), resp failure requiring intubation on 5/18, Mucus plugging s/p Bronch 5/19 and PEA arrest. Post operatively pt also found to have hemorrhagic pancreatitis with venu-pancreatic abscess possibly 2* to Depakote therefore s/p IR aspiration of peripancreatic fluid collection and paracentesis on 5/22, IR venu-pancreatic abscess drainage and placement of drainage catheter on 5/24. Pt then transferred from CTICU to SICU for further mgmt of hemorrhagic pancreatitis on 5/25. s/p IR placement of 2 new drainage catheters and catheter exchange on 5/26. LUQ drainage 5/30. OR 5/31 exlap washout & replacement of 3 new JPs and abthera vac with open abdomen. RTOR 6/1, no bleeding, evac of old blood, placement of feeding J-tube. failed extubation s/p trach 6/5    NEURO: Pain control: switch Dilaudid 3q4h to [id number] w/ additional PRN dilaudid, d/c'd fent gtt, Precedex gtt, d/c'd ketamine (6/14). Hx seizures: epilepsy recs appreciated, Cont vimpat. Depakote weaned off due to concerns for drug-related hemorrhagic pancreatitis. Repeat EEG (ordered)  HEENT: Artificial tears, Torticollis - PT/OT following   CV: MAP > 65. s/p PEA arrest on 5/24 night. TTE (6/14) LVEF 75%, LVH, biatrial enlargement and elevated PA pressure (elevated from previous), mild to mod MR/TR . C/w ASA 81mg. Now w/ rising sodium, will give chlorthalidone 500mg daily for diuresis and reevaluate in PM.    PULM: ARDS resolved - off NO, s/p multiple Mucus plug/ Lung collapse s/p bronch x3 (most recently on 5/26) most likely from outward obstruction: Cont rotating pt around the clock. Cont Duonebs, 3% saline. failed extubation 6/3: s/p Trach 6/5: Tolerating Trach Collar (6/14- ) (backup CPAP 8/PS 5).  GI/FEN: TF Vital 1.0 at 70cc via feeding J-tube, Protonix BID, Hemorrhagic pancreatitis: s/p multiple drain placements and paracentisis by IR team.   : AKF on HD s/p CVVHD, now w/ renal recovery, off of aquaphoresis for fluid overload. condom catheter. Lasix 80mg spot dosing.  HEME: Acute blood loss anemia requiring multiple transfusions  ENDO: mISS  ID: Ertapenem (6/13-6/16)// meropenem (6/9-6/13) vanco x 1 (6/9), caspofungin (6/9-6/12), previous Kleb bacteremia from 5/15 & 5/17, subsequent bld cx negative, PNA w/ bronch cx kleb, enterobacter (zosyn, erta resistant), E faecalis, strep angionosus from 5/19, pancreatic abscess cx pan-sensitive kleb 5/22. completed Ceftriaxone( 6/5- 6/15) Chloe (5/21-6/5),  Caspo (5/23-5/30), Ampicillin (5/21- 5/27).   PPX: SCDs, SQH  LINES: PIVs // L rad kalpana (5/31-6/15), 5Fr PIV in LUE (6/13-15), Lsubclav HD Cath (5/31-6/12), RIJ TLC (5/22-5/27), RIJ HD cath (5/22-31), R Ax kalpana(5/12-5/31), LIJ TLC (5/23-6/1), RIJ TLC (6/1-13)   WOUNDS/DRAINS: right OR CODIE, 2 left OR CODIE, left IR hematoma drain (bilious), left IR pancreatic drain  PT: PT/OT ordered 6/5. Out of bed to stretcher chair  Dispo: SICU

## 2023-06-15 NOTE — PROGRESS NOTE ADULT - ASSESSMENT
53 year old male with PMH of HTN, Type A Aortic dissection s/p Dacron grafts, AV resuspension (2013), CAD s/p CABG x 1 SVG to RCA (2013, Dr. Medina), seizure disorder, with recent admit (Mar/Apr) for replacement of transverse aortic arch, second stage TEVAR and aoto-axillary bypass, AV replacement, and CABG x1. Post op course c/b shock, TREY requiring CVVHD, dialysis. Readmitted on 4/27 ([facility]) for syncope at home where imaging revealed graft endoleak and pancreatitis. Patient taken back to OR 5/5 for TEVAR, with post op course now complicated by Klebseilla bacteremia (5/15), respiratory failure requiring intubation (5/18), and bronch and PEA arrest (5/19), with respiratory cultures revealing Enterobacter, E. Faecalis and Strep angionosus. Surgery reconsulted re: active hemorrhagic pancreatitis on CT (5/13), with subsequent IR aspiration of peripancreatic fluid collection (15cc, Kleb) and paracentesis - 4L (5/22). Patient started on CVVHD 5/22. Transferred to SICU (5/25), with catheter exchange and two additional drains placed on 5/26 in IR and exlap, wash out and new drain placement on 5/31. The patient returned to the OR 6/1 for evacuation of blood and placement of J tube. Patient failed extubation on 6/3 and received trach on 6/5. Noted to be febrile overnight 6/7, 6/8, 6/9. CT Ch/Ab/Pelvis overnight 6/7 with stable abdominal collections and atelectasis of LLL.     Plan:    -Trach collar  -OOB  -Cont Tube feeds  -Flush both hematoma and retroperitoneal drains  -Lasix prn  -Pain control  -Rest of care per SICU  -Surgery team 1 following

## 2023-06-15 NOTE — PROGRESS NOTE ADULT - SUBJECTIVE AND OBJECTIVE BOX
ON: Left upper arm IV infiltrated, new US guided placed in LOPEZ. Tolerating trach collar. No additional prn dilaudid pushes needed, -1L  6/14: Precedex increased for agitation, DC Ketamine. Stop Vital 1.5 due to diarrhea and DC fiber packet (concern for clogging J tube) and switch to Vital 1.0 at 70cc. Lasix 80 given. Pain regimen converted to oxy 40mg solution q4. Switched to trach collar, able to suction better. TTE LVEF 75%, LVH, biatrial enlargement and elevated PA pressure 41 (elevated from previous), mild to mod MR/TR. Net neg -320. Additional Lasix 80 at 5:45pm w 40 Kcl. OOB to recliner.      SUBJECTIVE: Patient seen and examined bedside; sleepy but arousable, not obvious complaints during exam.    aspirin  chewable 81 milliGRAM(s) Enteral Tube every 24 hours  ertapenem  IVPB 1000 milliGRAM(s) IV Intermittent every 24 hours  heparin   Injectable 5000 Unit(s) SubCutaneous every 8 hours      Vital Signs Last 24 Hrs  T(C): 37.4 (15 Vazquez 2023 04:37), Max: 37.6 (14 Jun 2023 09:00)  T(F): 99.3 (15 Vazquez 2023 04:37), Max: 99.7 (14 Jun 2023 09:00)  HR: 108 (15 Vazquez 2023 06:00) (91 - 135)  BP: --  BP(mean): --  RR: 12 (15 Vazquez 2023 06:00) (11 - 28)  SpO2: 100% (15 Vazquez 2023 06:00) (96% - 100%)    Parameters below as of 15 Vazquez 2023 07:00  Patient On (Oxygen Delivery Method): tracheostomy collar  O2 Flow (L/min): 10  O2 Concentration (%): 40  I&O's Detail    13 Jun 2023 07:01  -  14 Jun 2023 07:00  --------------------------------------------------------  IN:    Dexmedetomidine: 86.5 mL    Dexmedetomidine: 64.4 mL    Enteral Tube Flush: 320 mL    IV PiggyBack: 50 mL    IV PiggyBack: 75 mL    IV PiggyBack: 50 mL    IV PiggyBack: 250 mL    Ketamine: 18.2 mL    Ketamine: 49.9 mL    Ketamine: 9.2 mL    TPN (Total Parenteral Nutrition): 460 mL    Vital1.0: 1504 mL  Total IN: 2937.2 mL    OUT:    Bulb (mL): 25 mL    Bulb (mL): 45 mL    Bulb (mL): 540 mL    Drain (mL): 25 mL    Drain (mL): 30 mL    Incontinent per Condom Catheter (mL): 1600 mL    Voided (mL): 620 mL  Total OUT: 2885 mL    Total NET: 52.2 mL      14 Jun 2023 07:01  -  15 Vazquez 2023 06:26  --------------------------------------------------------  IN:    Dexmedetomidine: 4.6 mL    Dexmedetomidine: 101.2 mL    Enteral Tube Flush: 530 mL    IV PiggyBack: 50 mL    Ketamine: 6.9 mL    Vital1.0: 1162 mL  Total IN: 1854.7 mL    OUT:    Bulb (mL): 55 mL    Bulb (mL): 30 mL    Bulb (mL): 45 mL    Drain (mL): 10 mL    Drain (mL): 340 mL    Incontinent per Condom Catheter (mL): 1350 mL    Voided (mL): 1000 mL  Total OUT: 2830 mL    Total NET: -975.3 mL      PE:    General: NAD, resting comfortably in bed  C/V: NSR, s1 s2  Pulm: Nonlabored breathing, no respiratory distress  Abd: Soft, NTND, midline incision c/d/i, drains continue to look the same, no changes in output content, all serosanguinous except for hematoma which is bilious  Extrem: [facility]        LABS:                        7.1    11.68 )-----------( 518      ( 15 Vazquez 2023 05:24 )             22.6     06-15    146<H>  |  109<H>  |  34<H>  ----------------------------<  103<H>  4.4   |  29  |  0.72    Ca    8.7      15 Vazquez 2023 05:24  Phos  4.1     06-15  Mg     2.0     06-15    TPro  6.0  /  Alb  2.4<L>  /  TBili  0.6  /  DBili  0.3  /  AST  25  /  ALT  20  /  AlkPhos  148<H>  06-15          RADIOLOGY & ADDITIONAL STUDIES:

## 2023-06-15 NOTE — PROGRESS NOTE ADULT - ASSESSMENT
54yo Male pt with PMH HTN, type A aortic dissection s/p Dacron grafts, AV resuspension in 2013, CAD s/p CABG x 1 SVG to RCA (5/2013 with Dr. Medina), seizure disorder (last episode on 7/4/22) S/P replacement of transverse aortic arch, second stage thoracic endovascular aortic repair, aorto-axillary bypass, AV replacement (bio 23mm), in APr 2023 and CABG x 1 (SVG-RCA) EF 75% ([place], 3/20) now s/p 2nd state TEVAR on 5/5 for whom surgery was consulted for finding of acute pancreatitis with large peripancreatic/perigastric fluid collections on CTA abdomen 5/8 then acute hemorrhage starting 5/12/23 requiring 11 U pRBC over last 48 hours but with negative mesenteric angiogram 5/13/23. S/p paracentesis and LUQ collection aspiration (no drain per surgery) on 5/22/23. LUQ collection growing Klebsiella pneumoniae.    5/24/23:  LUQ drain placement by IR.     5/25/23:  LUQ drain upsizing. Placement of two additional 14 F drainage catheters in the left mid and lower abdomen. Placement of a left abdominal hematoma drain.     5/26/23:   Placement of a right ascites drain and right pelvic hematoma/abscess drain by IR. Upsized left abdomen hematoma drain and left pelvic abscess/hematoma drain to 16 Fr.    5/30/23:  LUQ drain placement.     5/31/23:  To OR for ex-lap and abdominal washout. Multiple IR drains removed as described above.     6/1/23:  Return to OR for washout and abdominal closure.     6/7/23:  CT CAP for infectious w/u showing decreased size of of abd fluid collections.     Plan:  -LUQ hematoma "Left Abdominal Hematoma CODIE Drain" with markedly increased output of 340 cc of serosanguinous output in 24 hrs compared to 25 cc in previous 24 hrs. Per surgery resident, this collection is communicating with and draining the LUQ peripancreatic abscess "Left Pancreatic CODIE Drain" (which is being flushed with 250 cc of NS by primary team).  -F/U cross sectional imaging per primary team.  -Continue to monitor drain outputs.   -IR will continue to follow.

## 2023-06-16 LAB
ANION GAP SERPL CALC-SCNC: 8 MMOL/L — SIGNIFICANT CHANGE UP (ref 5–17)
BUN SERPL-MCNC: 30 MG/DL — HIGH (ref 7–23)
CALCIUM SERPL-MCNC: 8.6 MG/DL — SIGNIFICANT CHANGE UP (ref 8.4–10.5)
CHLORIDE SERPL-SCNC: 106 MMOL/L — SIGNIFICANT CHANGE UP (ref 96–108)
CO2 SERPL-SCNC: 30 MMOL/L — SIGNIFICANT CHANGE UP (ref 22–31)
CREAT SERPL-MCNC: 0.71 MG/DL — SIGNIFICANT CHANGE UP (ref 0.5–1.3)
EGFR: 109 ML/MIN/1.73M2 — SIGNIFICANT CHANGE UP
GLUCOSE BLDC GLUCOMTR-MCNC: 101 MG/DL — HIGH (ref 70–99)
GLUCOSE BLDC GLUCOMTR-MCNC: 108 MG/DL — HIGH (ref 70–99)
GLUCOSE BLDC GLUCOMTR-MCNC: 108 MG/DL — HIGH (ref 70–99)
GLUCOSE BLDC GLUCOMTR-MCNC: 113 MG/DL — HIGH (ref 70–99)
GLUCOSE BLDC GLUCOMTR-MCNC: 99 MG/DL — SIGNIFICANT CHANGE UP (ref 70–99)
GLUCOSE SERPL-MCNC: 89 MG/DL — SIGNIFICANT CHANGE UP (ref 70–99)
HCT VFR BLD CALC: 22.6 % — LOW (ref 39–50)
HGB BLD-MCNC: 7 G/DL — CRITICAL LOW (ref 13–17)
MAGNESIUM SERPL-MCNC: 1.9 MG/DL — SIGNIFICANT CHANGE UP (ref 1.6–2.6)
MCHC RBC-ENTMCNC: 29.3 PG — SIGNIFICANT CHANGE UP (ref 27–34)
MCHC RBC-ENTMCNC: 31 GM/DL — LOW (ref 32–36)
MCV RBC AUTO: 94.6 FL — SIGNIFICANT CHANGE UP (ref 80–100)
NRBC # BLD: 0 /100 WBCS — SIGNIFICANT CHANGE UP (ref 0–0)
PHOSPHATE SERPL-MCNC: 4.4 MG/DL — SIGNIFICANT CHANGE UP (ref 2.5–4.5)
PLATELET # BLD AUTO: 623 K/UL — HIGH (ref 150–400)
POTASSIUM SERPL-MCNC: 4.6 MMOL/L — SIGNIFICANT CHANGE UP (ref 3.5–5.3)
POTASSIUM SERPL-SCNC: 4.6 MMOL/L — SIGNIFICANT CHANGE UP (ref 3.5–5.3)
RBC # BLD: 2.39 M/UL — LOW (ref 4.2–5.8)
RBC # FLD: 16.3 % — HIGH (ref 10.3–14.5)
SODIUM SERPL-SCNC: 144 MMOL/L — SIGNIFICANT CHANGE UP (ref 135–145)
WBC # BLD: 11.98 K/UL — HIGH (ref 3.8–10.5)
WBC # FLD AUTO: 11.98 K/UL — HIGH (ref 3.8–10.5)

## 2023-06-16 PROCEDURE — 71260 CT THORAX DX C+: CPT | Mod: 26

## 2023-06-16 PROCEDURE — 71045 X-RAY EXAM CHEST 1 VIEW: CPT | Mod: 26,76

## 2023-06-16 PROCEDURE — 74177 CT ABD & PELVIS W/CONTRAST: CPT | Mod: 26

## 2023-06-16 PROCEDURE — 99233 SBSQ HOSP IP/OBS HIGH 50: CPT | Mod: GC

## 2023-06-16 RX ORDER — SODIUM CHLORIDE 9 MG/ML
1000 INJECTION, SOLUTION INTRAVENOUS ONCE
Refills: 0 | Status: COMPLETED | OUTPATIENT
Start: 2023-06-16 | End: 2023-06-16

## 2023-06-16 RX ORDER — DIATRIZOATE MEGLUMINE 180 MG/ML
30 INJECTION, SOLUTION INTRAVESICAL ONCE
Refills: 0 | Status: COMPLETED | OUTPATIENT
Start: 2023-06-16 | End: 2023-06-16

## 2023-06-16 RX ORDER — ERTAPENEM SODIUM 1 G/1
1000 INJECTION, POWDER, LYOPHILIZED, FOR SOLUTION INTRAMUSCULAR; INTRAVENOUS EVERY 24 HOURS
Refills: 0 | Status: COMPLETED | OUTPATIENT
Start: 2023-06-16 | End: 2023-06-16

## 2023-06-16 RX ORDER — HYDROMORPHONE HYDROCHLORIDE 2 MG/ML
1 INJECTION INTRAMUSCULAR; INTRAVENOUS; SUBCUTANEOUS EVERY 4 HOURS
Refills: 0 | Status: DISCONTINUED | OUTPATIENT
Start: 2023-06-16 | End: 2023-06-23

## 2023-06-16 RX ORDER — METOPROLOL TARTRATE 50 MG
2.5 TABLET ORAL EVERY 6 HOURS
Refills: 0 | Status: DISCONTINUED | OUTPATIENT
Start: 2023-06-16 | End: 2023-06-28

## 2023-06-16 RX ORDER — LACOSAMIDE 50 MG/1
250 TABLET ORAL
Refills: 0 | Status: DISCONTINUED | OUTPATIENT
Start: 2023-06-16 | End: 2023-06-25

## 2023-06-16 RX ADMIN — OXYCODONE HYDROCHLORIDE 40 MILLIGRAM(S): 5 TABLET ORAL at 14:00

## 2023-06-16 RX ADMIN — HEPARIN SODIUM 5000 UNIT(S): 5000 INJECTION INTRAVENOUS; SUBCUTANEOUS at 21:54

## 2023-06-16 RX ADMIN — Medication 975 MILLIGRAM(S): at 23:18

## 2023-06-16 RX ADMIN — HEPARIN SODIUM 5000 UNIT(S): 5000 INJECTION INTRAVENOUS; SUBCUTANEOUS at 05:15

## 2023-06-16 RX ADMIN — Medication 81 MILLIGRAM(S): at 13:03

## 2023-06-16 RX ADMIN — OXYCODONE HYDROCHLORIDE 40 MILLIGRAM(S): 5 TABLET ORAL at 10:30

## 2023-06-16 RX ADMIN — HEPARIN SODIUM 5000 UNIT(S): 5000 INJECTION INTRAVENOUS; SUBCUTANEOUS at 13:03

## 2023-06-16 RX ADMIN — HYDROMORPHONE HYDROCHLORIDE 1 MILLIGRAM(S): 2 INJECTION INTRAMUSCULAR; INTRAVENOUS; SUBCUTANEOUS at 23:50

## 2023-06-16 RX ADMIN — OXYCODONE HYDROCHLORIDE 40 MILLIGRAM(S): 5 TABLET ORAL at 13:03

## 2023-06-16 RX ADMIN — OXYCODONE HYDROCHLORIDE 40 MILLIGRAM(S): 5 TABLET ORAL at 21:55

## 2023-06-16 RX ADMIN — HYDROMORPHONE HYDROCHLORIDE 1 MILLIGRAM(S): 2 INJECTION INTRAMUSCULAR; INTRAVENOUS; SUBCUTANEOUS at 07:52

## 2023-06-16 RX ADMIN — Medication 2.5 MILLIGRAM(S): at 18:49

## 2023-06-16 RX ADMIN — Medication 975 MILLIGRAM(S): at 13:00

## 2023-06-16 RX ADMIN — OXYCODONE HYDROCHLORIDE 40 MILLIGRAM(S): 5 TABLET ORAL at 01:10

## 2023-06-16 RX ADMIN — Medication 2.5 MILLIGRAM(S): at 23:18

## 2023-06-16 RX ADMIN — Medication 975 MILLIGRAM(S): at 03:24

## 2023-06-16 RX ADMIN — OXYCODONE HYDROCHLORIDE 40 MILLIGRAM(S): 5 TABLET ORAL at 17:54

## 2023-06-16 RX ADMIN — Medication 3 MILLILITER(S): at 23:26

## 2023-06-16 RX ADMIN — OXYCODONE HYDROCHLORIDE 40 MILLIGRAM(S): 5 TABLET ORAL at 05:06

## 2023-06-16 RX ADMIN — HYDROMORPHONE HYDROCHLORIDE 1 MILLIGRAM(S): 2 INJECTION INTRAMUSCULAR; INTRAVENOUS; SUBCUTANEOUS at 07:40

## 2023-06-16 RX ADMIN — OXYCODONE HYDROCHLORIDE 40 MILLIGRAM(S): 5 TABLET ORAL at 05:16

## 2023-06-16 RX ADMIN — PANTOPRAZOLE SODIUM 40 MILLIGRAM(S): 20 TABLET, DELAYED RELEASE ORAL at 12:00

## 2023-06-16 RX ADMIN — SODIUM CHLORIDE 4 MILLILITER(S): 9 INJECTION INTRAMUSCULAR; INTRAVENOUS; SUBCUTANEOUS at 23:26

## 2023-06-16 RX ADMIN — CHLORHEXIDINE GLUCONATE 15 MILLILITER(S): 213 SOLUTION TOPICAL at 18:49

## 2023-06-16 RX ADMIN — ERTAPENEM SODIUM 120 MILLIGRAM(S): 1 INJECTION, POWDER, LYOPHILIZED, FOR SOLUTION INTRAMUSCULAR; INTRAVENOUS at 21:54

## 2023-06-16 RX ADMIN — Medication 1 DROP(S): at 05:11

## 2023-06-16 RX ADMIN — Medication 975 MILLIGRAM(S): at 12:02

## 2023-06-16 RX ADMIN — Medication 975 MILLIGRAM(S): at 03:19

## 2023-06-16 RX ADMIN — Medication 3 MILLILITER(S): at 15:39

## 2023-06-16 RX ADMIN — Medication 15 MILLILITER(S): at 12:36

## 2023-06-16 RX ADMIN — OXYCODONE HYDROCHLORIDE 40 MILLIGRAM(S): 5 TABLET ORAL at 09:28

## 2023-06-16 RX ADMIN — CHLORHEXIDINE GLUCONATE 15 MILLILITER(S): 213 SOLUTION TOPICAL at 05:13

## 2023-06-16 RX ADMIN — SODIUM CHLORIDE 4 MILLILITER(S): 9 INJECTION INTRAMUSCULAR; INTRAVENOUS; SUBCUTANEOUS at 15:39

## 2023-06-16 RX ADMIN — CHLORHEXIDINE GLUCONATE 1 APPLICATION(S): 213 SOLUTION TOPICAL at 05:14

## 2023-06-16 RX ADMIN — SODIUM CHLORIDE 4 MILLILITER(S): 9 INJECTION INTRAMUSCULAR; INTRAVENOUS; SUBCUTANEOUS at 09:16

## 2023-06-16 RX ADMIN — Medication 80 MILLIGRAM(S): at 05:15

## 2023-06-16 RX ADMIN — Medication 1 DROP(S): at 18:50

## 2023-06-16 RX ADMIN — Medication 2 MILLIGRAM(S): at 23:19

## 2023-06-16 RX ADMIN — Medication 975 MILLIGRAM(S): at 23:25

## 2023-06-16 RX ADMIN — HYDROMORPHONE HYDROCHLORIDE 1 MILLIGRAM(S): 2 INJECTION INTRAMUSCULAR; INTRAVENOUS; SUBCUTANEOUS at 03:24

## 2023-06-16 RX ADMIN — SODIUM CHLORIDE 1000 MILLILITER(S): 9 INJECTION, SOLUTION INTRAVENOUS at 09:25

## 2023-06-16 RX ADMIN — OXYCODONE HYDROCHLORIDE 40 MILLIGRAM(S): 5 TABLET ORAL at 22:05

## 2023-06-16 RX ADMIN — Medication 975 MILLIGRAM(S): at 19:50

## 2023-06-16 RX ADMIN — Medication 3 MILLILITER(S): at 09:16

## 2023-06-16 RX ADMIN — HYDROMORPHONE HYDROCHLORIDE 1 MILLIGRAM(S): 2 INJECTION INTRAMUSCULAR; INTRAVENOUS; SUBCUTANEOUS at 23:34

## 2023-06-16 RX ADMIN — LACOSAMIDE 150 MILLIGRAM(S): 50 TABLET ORAL at 05:43

## 2023-06-16 RX ADMIN — Medication 2 MILLIGRAM(S): at 12:01

## 2023-06-16 RX ADMIN — HYDROMORPHONE HYDROCHLORIDE 1 MILLIGRAM(S): 2 INJECTION INTRAMUSCULAR; INTRAVENOUS; SUBCUTANEOUS at 03:19

## 2023-06-16 RX ADMIN — Medication 3 MILLILITER(S): at 05:57

## 2023-06-16 RX ADMIN — SODIUM CHLORIDE 4 MILLILITER(S): 9 INJECTION INTRAMUSCULAR; INTRAVENOUS; SUBCUTANEOUS at 06:12

## 2023-06-16 RX ADMIN — OXYCODONE HYDROCHLORIDE 40 MILLIGRAM(S): 5 TABLET ORAL at 03:15

## 2023-06-16 RX ADMIN — OXYCODONE HYDROCHLORIDE 40 MILLIGRAM(S): 5 TABLET ORAL at 18:50

## 2023-06-16 RX ADMIN — Medication 975 MILLIGRAM(S): at 18:49

## 2023-06-16 RX ADMIN — DIATRIZOATE MEGLUMINE 30 MILLILITER(S): 180 INJECTION, SOLUTION INTRAVESICAL at 08:25

## 2023-06-16 RX ADMIN — LACOSAMIDE 250 MILLIGRAM(S): 50 TABLET ORAL at 18:50

## 2023-06-16 NOTE — PROGRESS NOTE ADULT - SUBJECTIVE AND OBJECTIVE BOX
SUBJECTIVE: Patient seen and evaluated.         MEDICATIONS  (STANDING):  acetaminophen   Oral Liquid .. 975 milliGRAM(s) Enteral Tube every 6 hours  albuterol/ipratropium for Nebulization 3 milliLiter(s) Nebulizer every 6 hours  artificial  tears Solution 1 Drop(s) Both EYES two times a day  aspirin  chewable 81 milliGRAM(s) Enteral Tube every 24 hours  chlorhexidine 0.12% Liquid 15 milliLiter(s) Oral Mucosa every 12 hours  chlorhexidine 2% Cloths 1 Application(s) Topical daily  dextrose 5%. 1000 milliLiter(s) (100 mL/Hr) IV Continuous <Continuous>  dextrose 50% Injectable 25 Gram(s) IV Push once  ertapenem  IVPB 1000 milliGRAM(s) IV Intermittent every 24 hours  glucagon  Injectable 1 milliGRAM(s) IntraMuscular once  heparin   Injectable 5000 Unit(s) SubCutaneous every 8 hours  insulin lispro (ADMELOG) corrective regimen sliding scale   SubCutaneous every 6 hours  lacosamide Solution 250 milliGRAM(s) Oral two times a day  loperamide Liquid 2 milliGRAM(s) Oral every 12 hours  multivitamin/minerals/iron Oral Solution (CENTRUM) 15 milliLiter(s) Oral daily  oxyCODONE    Solution 40 milliGRAM(s) Oral every 4 hours  pantoprazole  Injectable 40 milliGRAM(s) IV Push daily  sodium chloride 3%  Inhalation 4 milliLiter(s) Inhalation every 6 hours    MEDICATIONS  (PRN):  dextrose Oral Gel 15 Gram(s) Oral once PRN Blood Glucose LESS THAN 70 milliGRAM(s)/deciliter  HYDROmorphone  Injectable 1 milliGRAM(s) IV Push every 4 hours PRN breakthrough pain  sodium chloride 0.9% lock flush 10 milliLiter(s) IV Push every 1 hour PRN Pre/post blood products, medications, blood draw, and to maintain line patency      Vital Signs Last 24 Hrs  T(C): 37.1 (16 Jun 2023 13:10), Max: 37.9 (16 Jun 2023 03:00)  T(F): 98.7 (16 Jun 2023 13:10), Max: 100.2 (16 Jun 2023 03:00)  HR: 130 (16 Jun 2023 13:00) (117 - 130)  BP: 145/88 (16 Jun 2023 13:00) (119/79 - 170/83)  BP(mean): 110 (16 Jun 2023 13:00) (93 - 120)  RR: 30 (16 Jun 2023 13:00) (11 - 52)  SpO2: 100% (16 Jun 2023 13:00) (96% - 100%)    Parameters below as of 16 Jun 2023 14:00  Patient On (Oxygen Delivery Method): tracheostomy collar  O2 Flow (L/min): 10  O2 Concentration (%): 40    Physical Exam:  Physical Exam:  General: NAD, resting comfortable, responsive to voice, tracks appropriately  Pulmonary: Nonlabored, on Trach collar, decreased breath sounds Left base, right base, no wheeze  Cardiovascular: S1 S2 tachycardic  Abdominal: soft, midline incision cdi, drains in place IRx2, Surgical x4, Pancreatic drain to gravity, left RP drain bilious, J tube in place  Extremities: Warm, left upper extremity contraction, edematous, groin fluctuance unchanged      I&O's Summary    15 Vazquez 2023 07:01  -  16 Jun 2023 07:00  --------------------------------------------------------  IN: 2756.6 mL / OUT: 3259 mL / NET: -502.4 mL    16 Jun 2023 07:01  -  16 Jun 2023 13:30  --------------------------------------------------------  IN: 1540 mL / OUT: 620 mL / NET: 920 mL        LABS:                        7.0    11.98 )-----------( 623      ( 16 Jun 2023 03:54 )             22.6     06-16    144  |  106  |  30<H>  ----------------------------<  89  4.6   |  30  |  0.71    Ca    8.6      16 Jun 2023 03:54  Phos  4.4     06-16  Mg     1.9     06-16    TPro  6.0  /  Alb  2.4<L>  /  TBili  0.6  /  DBili  0.3  /  AST  25  /  ALT  20  /  AlkPhos  148<H>  06-15        CAPILLARY BLOOD GLUCOSE      POCT Blood Glucose.: 101 mg/dL (16 Jun 2023 13:01)  POCT Blood Glucose.: 99 mg/dL (16 Jun 2023 05:18)  POCT Blood Glucose.: 113 mg/dL (15 Vazquez 2023 22:31)  POCT Blood Glucose.: 109 mg/dL (15 Vazquez 2023 18:02)    LIVER FUNCTIONS - ( 15 Vazquez 2023 05:24 )  Alb: 2.4 g/dL / Pro: 6.0 g/dL / ALK PHOS: 148 U/L / ALT: 20 U/L / AST: 25 U/L / GGT: x             RADIOLOGY & ADDITIONAL STUDIES:

## 2023-06-16 NOTE — PROGRESS NOTE ADULT - ATTENDING COMMENTS
HTN, seizure disorder, h/o aortic dissection, AVR now s/p TEVAR c/b severe hemorrhagic pancreatitis, Klebsiella bacteremia, peritonitis, pna, ATN, metabolic encephalopathy  physical as above  low grade fever with increasing WBC and platelets prompted CT which did not show any new collections  continue to follow off antibiotics  no diuresis today s/p IV contrast administration; hydrated with extra liter of RL  follow renal function  vital feeds 70/hr with liquid protein  less diarrhea on loperamide  continue TC  decision making of high complexity

## 2023-06-16 NOTE — CHART NOTE - NSCHARTNOTEFT_GEN_A_CORE
Admitting Diagnosis:   Patient is a 53y old  Male who presents with a chief complaint of endoleak, abdominal pain (15 Vazquez 2023 15:07)      PAST MEDICAL & SURGICAL HISTORY:  HTN (hypertension)      Aortic dissection      CAD (coronary artery disease)      Seizure disorder      Seizure disorder      Hypertension      Status post endovascular aneurysm repair (EVAR)      S/P aortic bifurcation bypass graft      S/P CABG x 1          Current Nutrition Order:    PO Intake: Good (%) [   ]  Fair (50-75%) [   ] Poor (<25%) [   ]    GI Issues:     Pain:    Skin Integrity:    Labs:   06-16    144  |  106  |  30<H>  ----------------------------<  89  4.6   |  30  |  0.71    Ca    8.6      16 Jun 2023 03:54  Phos  4.4     06-16  Mg     1.9     06-16    TPro  6.0  /  Alb  2.4<L>  /  TBili  0.6  /  DBili  0.3  /  AST  25  /  ALT  20  /  AlkPhos  148<H>  06-15    CAPILLARY BLOOD GLUCOSE      POCT Blood Glucose.: 99 mg/dL (16 Jun 2023 05:18)  POCT Blood Glucose.: 113 mg/dL (15 Vazquez 2023 22:31)  POCT Blood Glucose.: 109 mg/dL (15 Vazquez 2023 18:02)  POCT Blood Glucose.: 113 mg/dL (15 Vazquez 2023 11:44)      Medications:  MEDICATIONS  (STANDING):  acetaminophen   Oral Liquid .. 975 milliGRAM(s) Enteral Tube every 6 hours  albuterol/ipratropium for Nebulization 3 milliLiter(s) Nebulizer every 6 hours  artificial  tears Solution 1 Drop(s) Both EYES two times a day  aspirin  chewable 81 milliGRAM(s) Enteral Tube every 24 hours  chlorhexidine 0.12% Liquid 15 milliLiter(s) Oral Mucosa every 12 hours  chlorhexidine 2% Cloths 1 Application(s) Topical daily  chlorothiazide IVPB 500 milliGRAM(s) IV Intermittent every 24 hours  dexMEDEtomidine Infusion 0.2 MICROgram(s)/kG/Hr (4.55 mL/Hr) IV Continuous <Continuous>  dextrose 5%. 1000 milliLiter(s) (100 mL/Hr) IV Continuous <Continuous>  dextrose 50% Injectable 25 Gram(s) IV Push once  furosemide   Injectable 80 milliGRAM(s) IV Push daily  glucagon  Injectable 1 milliGRAM(s) IntraMuscular once  heparin   Injectable 5000 Unit(s) SubCutaneous every 8 hours  insulin lispro (ADMELOG) corrective regimen sliding scale   SubCutaneous every 6 hours  lacosamide IVPB 250 milliGRAM(s) IV Intermittent every 12 hours  loperamide Liquid 2 milliGRAM(s) Oral every 12 hours  multivitamin/minerals/iron Oral Solution (CENTRUM) 15 milliLiter(s) Oral daily  oxyCODONE    Solution 40 milliGRAM(s) Oral every 4 hours  pantoprazole  Injectable 40 milliGRAM(s) IV Push daily  sodium chloride 3%  Inhalation 4 milliLiter(s) Inhalation every 6 hours    MEDICATIONS  (PRN):  dextrose Oral Gel 15 Gram(s) Oral once PRN Blood Glucose LESS THAN 70 milliGRAM(s)/deciliter  HYDROmorphone  Injectable 1 milliGRAM(s) IV Push every 4 hours PRN breakthrough pain  sodium chloride 0.9% lock flush 10 milliLiter(s) IV Push every 1 hour PRN Pre/post blood products, medications, blood draw, and to maintain line patency      Weight:  Daily     Daily     Weight Change:     Estimated energy needs:     Subjective:   53yMale w/ PMHx of HTN, type A aortic dissection s/p Dacron grafts, AV resuspension in 2013, CAD s/p CABG x 1 SVG to RCA (5/2013 with Dr. Medina), seizure disorder, recent admission 3/20-4/14 for replacement of transverse aortic arch, second stage TEVAR and aorto-axillary bypass, AV replacement (bio 23mm), and CABG x 1 (SVG-RCA). Pt found to have endo leak and taken to OR for TEVAR revision on 5/5, Post op course c/b Klebsiella bacteremia (5/15), resp failure requiring intubation on 5/18, Mucus plugging s/p Bronch 5/19 and PEA arrest. Post operatively pt also found to have hemorrhagic pancreatitis with venu-pancreatic abscess possibly 2* to Depakote therefore s/p IR aspiration of peripancreatic fluid collection and paracentesis on 5/22, IR venu-pancreatic abscess drainage and placement of drainage catheter on 5/24. Pt then transferred from CTICU to SICU for further mgmt of hemorrhagic pancreatitis on 5/25. s/p IR placement of 2 new drainage catheters and catheter exchange on 5/26. LUQ drainage 5/30. OR 5/31 exlap washout & replacement of 3 new JPs and abthera vac with open abdomen. RTOR 6/1, no bleeding, evac of old blood, placement of feeding J-tube. Failed extubation 6/3: s/p Trach 6/5.    Chart reviewed, discussed with IDT. EN remains primary means to nutritoin- receiving Vital 1.0 @70ml/hr. On trach collar.          Previous Nutrition Diagnosis:    Active [   ]  Resolved [   ]    If resolved, new PES:     Goal:    Recommendations:    Education:     Risk Level: High [   ] Moderate [   ] Low [   ] Admitting Diagnosis:   Patient is a 53y old  Male who presents with a chief complaint of endoleak, abdominal pain (15 Vazquez 2023 15:07)      PAST MEDICAL & SURGICAL HISTORY:  HTN (hypertension)      Aortic dissection      CAD (coronary artery disease)      Seizure disorder      Seizure disorder      Hypertension      Status post endovascular aneurysm repair (EVAR)      S/P aortic bifurcation bypass graft      S/P CABG x 1          Current Nutrition Order:  Vital 1.0 @70ml/hr x 24hrs; provides 1680ml total volume, 1680kcals, 67.2g protein, 1401ml free water daily    PO Intake: Good (%) [   ]  Fair (50-75%) [   ] Poor (<25%) [   ] - N/A    GI Issues: BM noted today    Pain: no nonverbal indicators of pain noted    Skin Integrity: unstageable PUs to sacrum & coccyx, DTIs to scapula, L ear, stage II PU to penis, IR drains x 2, surgical drains x 4, pancreatic drain, J tube, edematous UEs    Labs:       144  |  106  |  30<H>  ----------------------------<  89  4.6   |  30  |  0.71    Ca    8.6      2023 03:54  Phos  4.4       Mg     1.9     16    TPro  6.0  /  Alb  2.4<L>  /  TBili  0.6  /  DBili  0.3  /  AST  25  /  ALT  20  /  AlkPhos  148<H>  -15    CAPILLARY BLOOD GLUCOSE      POCT Blood Glucose.: 99 mg/dL (2023 05:18)  POCT Blood Glucose.: 113 mg/dL (15 Vazquez 2023 22:31)  POCT Blood Glucose.: 109 mg/dL (15 Vazquez 2023 18:02)  POCT Blood Glucose.: 113 mg/dL (15 Vazquez 2023 11:44)      Medications:  MEDICATIONS  (STANDING):  acetaminophen   Oral Liquid .. 975 milliGRAM(s) Enteral Tube every 6 hours  albuterol/ipratropium for Nebulization 3 milliLiter(s) Nebulizer every 6 hours  artificial  tears Solution 1 Drop(s) Both EYES two times a day  aspirin  chewable 81 milliGRAM(s) Enteral Tube every 24 hours  chlorhexidine 0.12% Liquid 15 milliLiter(s) Oral Mucosa every 12 hours  chlorhexidine 2% Cloths 1 Application(s) Topical daily  chlorothiazide IVPB 500 milliGRAM(s) IV Intermittent every 24 hours  dexMEDEtomidine Infusion 0.2 MICROgram(s)/kG/Hr (4.55 mL/Hr) IV Continuous <Continuous>  dextrose 5%. 1000 milliLiter(s) (100 mL/Hr) IV Continuous <Continuous>  dextrose 50% Injectable 25 Gram(s) IV Push once  furosemide   Injectable 80 milliGRAM(s) IV Push daily  glucagon  Injectable 1 milliGRAM(s) IntraMuscular once  heparin   Injectable 5000 Unit(s) SubCutaneous every 8 hours  insulin lispro (ADMELOG) corrective regimen sliding scale   SubCutaneous every 6 hours  lacosamide IVPB 250 milliGRAM(s) IV Intermittent every 12 hours  loperamide Liquid 2 milliGRAM(s) Oral every 12 hours  multivitamin/minerals/iron Oral Solution (CENTRUM) 15 milliLiter(s) Oral daily  oxyCODONE    Solution 40 milliGRAM(s) Oral every 4 hours  pantoprazole  Injectable 40 milliGRAM(s) IV Push daily  sodium chloride 3%  Inhalation 4 milliLiter(s) Inhalation every 6 hours    MEDICATIONS  (PRN):  dextrose Oral Gel 15 Gram(s) Oral once PRN Blood Glucose LESS THAN 70 milliGRAM(s)/deciliter  HYDROmorphone  Injectable 1 milliGRAM(s) IV Push every 4 hours PRN breakthrough pain  sodium chloride 0.9% lock flush 10 milliLiter(s) IV Push every 1 hour PRN Pre/post blood products, medications, blood draw, and to maintain line patency      Weight: 90.9kg /7  70kg dosing wt May &     Weight Change: wt change ? 2/2 edema, need reweight to confirm. recommend daily weights for trending    Estimated energy needs:   Previous wt of 70kg used for energy calculations as falls within % IBW  Needs adjusted for age, clinical conditions, pressure ulcers  25-30 kcals/[id number] kcals/day  1.2-1.7 g/k-119g protein/day  Fluids per team.    Subjective:   53yMale w/ PMHx of HTN, type A aortic dissection s/p Dacron grafts, AV resuspension in , CAD s/p CABG x 1 SVG to RCA (2013 with Dr. Medina), seizure disorder, recent admission 3/20- for replacement of transverse aortic arch, second stage TEVAR and aorto-axillary bypass, AV replacement (bio 23mm), and CABG x 1 (SVG-RCA). Pt found to have endo leak and taken to OR for TEVAR revision on , Post op course c/b Klebsiella bacteremia (5/15), resp failure requiring intubation on , Mucus plugging s/p Bronch  and PEA arrest. Post operatively pt also found to have hemorrhagic pancreatitis with venu-pancreatic abscess possibly 2* to Depakote therefore s/p IR aspiration of peripancreatic fluid collection and paracentesis on , IR venu-pancreatic abscess drainage and placement of drainage catheter on . Pt then transferred from CTICU to SICU for further mgmt of hemorrhagic pancreatitis on . s/p IR placement of 2 new drainage catheters and catheter exchange on . LUQ drainage . OR  exlap washout & replacement of 3 new JPs and abthera vac with open abdomen. RTOR , no bleeding, evac of old blood, placement of feeding J-tube. Failed extubation 6/3: s/p Trach .    Chart reviewed, discussed with IDT. TPN d/c , EN as primary means to nutrition- receiving Vital 1.0 @70ml/hr [provides 24kcals/kg, 0.96g protein/kg]. On trach collar. TMax: 37.9 C. HR trending HH, BP WNL. MAPs >65 mmHg. I&Os x 24hrs 2756.6 [1680ml TF + 510ml flush / 3259 [2.8L uop + 13 + 400 + 29 + 5ml bulb drain + 12ml CODIE drain output] = -502.4ml net. 1 BM noted today. NGT placed today for contrast. Renal function improved, now off aquaphoresis. Voiding adequately. BUN 30H, Creat & GFR WNL. Meds reviewed- on MVI + minerals & iron, immodium BID, protonix. Recommend more concentrated EN formula to better meet estimated needs & limit free water. RDN will continue to monitor, reassess, and intervene as appropriate.           Previous Nutrition Diagnosis: Increased nutrients RT increased demands AEB Vent, Pressure ulcers    Active [  X ]  Resolved [   ]    If resolved, new PES:     Goal:  Pt will meet at least 75% of protein & energy needs via most appropriate route for nutrition     Recommendations:  1. Continue EN via PEJ as tolerated- recommend Vital 1.5 @48ml/hr x 24hrs; provides 1152ml total volume, 1728 kcals, 77.8g protein, 880ml free water daily + 1 LPS daily [100kcals, 15g protein] -- peptide-based formula for easier digestion & absorption  - would provide 26 kcals/kg, 1.3g protein/kg   - hold feeds if increase in pressor requirements, MAPs consistently trending <60 mmHg, lactic acidosis present, GI intolerance is noted   2. GI  - bowel regimen per team  - c/w immodium BID  3. c/w MVI + minerals  4. Trend renal labs  5. I&Os  6. Please obtain new weight    RDN will continue to monitor, reassess, and intervene as appropriate.     Education: deferred     Risk Level: High [  X ] Moderate [   ] Low [   ]

## 2023-06-16 NOTE — PROGRESS NOTE ADULT - SUBJECTIVE AND OBJECTIVE BOX
1. 16 Fr teal LUQ peripancreatic abscess "Left Pancreatic CODIE Drain" placed on 5/24 (attached to gravity bag):   -15 cc serosanguinous output in 24 hrs.    2. 16 Fr teal LUQ hematoma "Left Abdominal Hematoma CODIE Drain" placed on 5/25 and upsized on 5/26:   -12 cc serosanguinous output in 24 hrs.

## 2023-06-16 NOTE — PROGRESS NOTE ADULT - ASSESSMENT
53yMale w/ PMHx of HTN, type A aortic dissection s/p Dacron grafts, AV resuspension in 2013, CAD s/p CABG x 1 SVG to RCA (5/2013 with Dr. Medina), seizure disorder, recent admission 3/20-4/14 for replacement of transverse aortic arch, second stage TEVAR and aorto-axillary bypass, AV replacement (bio 23mm), and CABG x 1 (SVG-RCA). Pt found to have endo leak and taken to OR for TEVAR revision on 5/5, Post op course c/b Klebsiella bacteremia (5/15), resp failure requiring intubation on 5/18, Mucus plugging s/p Bronch 5/19 and PEA arrest. Post operatively pt also found to have hemorrhagic pancreatitis with venu-pancreatic abscess possibly 2* to Depakote therefore s/p IR aspiration of peripancreatic fluid collection and paracentesis on 5/22, IR venu-pancreatic abscess drainage and placement of drainage catheter on 5/24. Pt then transferred from CTICU to SICU for further mgmt of hemorrhagic pancreatitis on 5/25. s/p IR placement of 2 new drainage catheters and catheter exchange on 5/26. LUQ drainage 5/30. OR 5/31 exlap washout & replacement of 3 new JPs and abthera vac with open abdomen. RTOR 6/1, no bleeding, evac of old blood, placement of feeding J-tube. failed extubation s/p trach 6/5    NEURO: Pain control: switch Dilaudid 3q4h to [id number] w/ additional PRN dilaudid, d/c'd fent gtt, Precedex gtt, d/c'd ketamine (6/14). Hx seizures: epilepsy recs appreciated, Cont vimpat. Depakote weaned off due to concerns for drug-related hemorrhagic pancreatitis. Repeat EEG (ordered)  HEENT: Artificial tears, Torticollis - PT/OT following   CV: MAP > 65. s/p PEA arrest on 5/24 night. TTE (6/14) LVEF 75%, LVH, biatrial enlargement and elevated PA pressure (elevated from previous), mild to mod MR/TR . C/w ASA 81mg.   PULM: ARDS resolved - off NO, s/p multiple Mucus plug/ Lung collapse s/p bronch x3 (most recently on 5/26) most likely from outward obstruction: Cont rotating pt around the clock. Cont Duonebs, 3% saline. failed extubation 6/3: s/p Trach 6/5: Tolerating Trach Collar (backup CPAP 8/PS 5)  GI/FEN: TF Vital 1.0 at 70cc via feeding J-tube with imodium 2mg BID, Protonix BID, Hemorrhagic pancreatitis: s/p multiple drain placements and paracentisis by IR team.   : AKF on HD s/p CVVHD, now w/ renal recovery, off of aquaphoresis for fluid overload. condom catheter. Lasix 80mg spot dosing.  HEME: Acute blood loss anemia requiring multiple transfusions  ENDO: mISS  ID: Ertapenem (6/13-6/16)// meropenem (6/9-6/13) vanco x 1 (6/9), caspofungin (6/9-6/12), previous Kleb bacteremia from 5/15 & 5/17, subsequent bld cx negative, PNA w/ bronch cx kleb, enterobacter (zosyn, erta resistant), E faecalis, strep angionosus from 5/19, pancreatic abscess cx pan-sensitive kleb 5/22. completed Ceftriaxone( 6/5- 6/15) Chloe (5/21-6/5),  Caspo (5/23-5/30), Ampicillin (5/21- 5/27).   PPX: SCDs, SQH  LINES: PIVs // L rad kalpana (5/31-6/15), 5Fr PIV in LUE (6/13-15), Lsubclav HD Cath (5/31-6/12), RIJ TLC (5/22-5/27), RIJ HD cath (5/22-31), R Ax kalpana(5/12-5/31), LIJ TLC (5/23-6/1), RIJ TLC (6/1-13)   WOUNDS/DRAINS: right OR CODIE, 2 left OR CODIE, left IR hematoma drain (bilious), left IR pancreatic drain  PT: PT/OT ordered 6/5. Out of bed to stretcher chair  Dispo: SICU

## 2023-06-16 NOTE — PROVIDER CONTACT NOTE (CRITICAL VALUE NOTIFICATION) - BACKGROUND
Pt admitted for swelling of extremity w/ extensive cardiac hx, AAA, CAD, and etc.
S/P 2nd stage TEVAR
Pt. s/p EVAR repair

## 2023-06-16 NOTE — PROGRESS NOTE ADULT - ASSESSMENT
52yo Male pt with PMH HTN, type A aortic dissection s/p Dacron grafts, AV resuspension in 2013, CAD s/p CABG x 1 SVG to RCA (5/2013 with Dr. Medina), seizure disorder (last episode on 7/4/22) S/P replacement of transverse aortic arch, second stage thoracic endovascular aortic repair, aorto-axillary bypass, AV replacement (bio 23mm), in APr 2023 and CABG x 1 (SVG-RCA) EF 75% (Ignacio, 3/20) now s/p 2nd state TEVAR on 5/5 for whom surgery was consulted for finding of acute pancreatitis with large peripancreatic/perigastric fluid collections on CTA abdomen 5/8 then acute hemorrhage starting 5/12/23 requiring 11 U pRBC over last 48 hours but with negative mesenteric angiogram 5/13/23. S/p paracentesis and LUQ collection aspiration (no drain per surgery) on 5/22/23. LUQ collection growing Klebsiella pneumoniae.    5/24/23:  LUQ drain placement by IR.     5/25/23:  LUQ drain upsizing. Placement of two additional 14 F drainage catheters in the left mid and lower abdomen. Placement of a left abdominal hematoma drain.     5/26/23:   Placement of a right ascites drain and right pelvic hematoma/abscess drain by IR. Upsized left abdomen hematoma drain and left pelvic abscess/hematoma drain to 16 Fr.    5/30/23:  LUQ drain placement.     5/31/23:  To OR for ex-lap and abdominal washout. Multiple IR drains removed as described above.     6/1/23:  Return to OR for washout and abdominal closure.     6/7/23:  CT CAP for infectious w/u showing decreased size of of abd fluid collections.     Plan:  -Continue to monitor drain outputs.   -IR will continue to follow.

## 2023-06-16 NOTE — PROVIDER CONTACT NOTE (CRITICAL VALUE NOTIFICATION) - ACTION/TREATMENT ORDERED:
MD made aware. Pt being transfused with 2 units PRBC and going to OR
Notified provider and KUN Echavarria.
Pt will receive 1 dose of Meropenem
No interventions ordered at this time, possible blood transfusion.

## 2023-06-17 LAB
ANION GAP SERPL CALC-SCNC: 13 MMOL/L — SIGNIFICANT CHANGE UP (ref 5–17)
ANION GAP SERPL CALC-SCNC: 9 MMOL/L — SIGNIFICANT CHANGE UP (ref 5–17)
APPEARANCE UR: CLEAR — SIGNIFICANT CHANGE UP
BACTERIA # UR AUTO: PRESENT /HPF
BILIRUB UR-MCNC: NEGATIVE — SIGNIFICANT CHANGE UP
BUN SERPL-MCNC: 25 MG/DL — HIGH (ref 7–23)
BUN SERPL-MCNC: 26 MG/DL — HIGH (ref 7–23)
CALCIUM SERPL-MCNC: 8 MG/DL — LOW (ref 8.4–10.5)
CALCIUM SERPL-MCNC: 8.4 MG/DL — SIGNIFICANT CHANGE UP (ref 8.4–10.5)
CHLORIDE SERPL-SCNC: 105 MMOL/L — SIGNIFICANT CHANGE UP (ref 96–108)
CHLORIDE SERPL-SCNC: 105 MMOL/L — SIGNIFICANT CHANGE UP (ref 96–108)
CO2 SERPL-SCNC: 27 MMOL/L — SIGNIFICANT CHANGE UP (ref 22–31)
CO2 SERPL-SCNC: 29 MMOL/L — SIGNIFICANT CHANGE UP (ref 22–31)
COLOR SPEC: YELLOW — SIGNIFICANT CHANGE UP
CREAT SERPL-MCNC: 0.67 MG/DL — SIGNIFICANT CHANGE UP (ref 0.5–1.3)
CREAT SERPL-MCNC: 0.67 MG/DL — SIGNIFICANT CHANGE UP (ref 0.5–1.3)
CULTURE RESULTS: SIGNIFICANT CHANGE UP
CULTURE RESULTS: SIGNIFICANT CHANGE UP
DIFF PNL FLD: NEGATIVE — SIGNIFICANT CHANGE UP
EGFR: 111 ML/MIN/1.73M2 — SIGNIFICANT CHANGE UP
EGFR: 111 ML/MIN/1.73M2 — SIGNIFICANT CHANGE UP
EPI CELLS # UR: SIGNIFICANT CHANGE UP /HPF (ref 0–5)
GLUCOSE BLDC GLUCOMTR-MCNC: 107 MG/DL — HIGH (ref 70–99)
GLUCOSE BLDC GLUCOMTR-MCNC: 108 MG/DL — HIGH (ref 70–99)
GLUCOSE BLDC GLUCOMTR-MCNC: 112 MG/DL — HIGH (ref 70–99)
GLUCOSE BLDC GLUCOMTR-MCNC: 114 MG/DL — HIGH (ref 70–99)
GLUCOSE SERPL-MCNC: 106 MG/DL — HIGH (ref 70–99)
GLUCOSE SERPL-MCNC: 164 MG/DL — HIGH (ref 70–99)
GLUCOSE UR QL: NEGATIVE — SIGNIFICANT CHANGE UP
GRAM STN FLD: SIGNIFICANT CHANGE UP
HCT VFR BLD CALC: 23.1 % — LOW (ref 39–50)
HGB BLD-MCNC: 7.3 G/DL — LOW (ref 13–17)
KETONES UR-MCNC: NEGATIVE — SIGNIFICANT CHANGE UP
LEUKOCYTE ESTERASE UR-ACNC: NEGATIVE — SIGNIFICANT CHANGE UP
MAGNESIUM SERPL-MCNC: 1.8 MG/DL — SIGNIFICANT CHANGE UP (ref 1.6–2.6)
MCHC RBC-ENTMCNC: 30.2 PG — SIGNIFICANT CHANGE UP (ref 27–34)
MCHC RBC-ENTMCNC: 31.6 GM/DL — LOW (ref 32–36)
MCV RBC AUTO: 95.5 FL — SIGNIFICANT CHANGE UP (ref 80–100)
NITRITE UR-MCNC: NEGATIVE — SIGNIFICANT CHANGE UP
NRBC # BLD: 0 /100 WBCS — SIGNIFICANT CHANGE UP (ref 0–0)
PH UR: 7 — SIGNIFICANT CHANGE UP (ref 5–8)
PHOSPHATE SERPL-MCNC: 5 MG/DL — HIGH (ref 2.5–4.5)
PLATELET # BLD AUTO: 687 K/UL — HIGH (ref 150–400)
POTASSIUM SERPL-MCNC: 3.7 MMOL/L — SIGNIFICANT CHANGE UP (ref 3.5–5.3)
POTASSIUM SERPL-MCNC: 4.1 MMOL/L — SIGNIFICANT CHANGE UP (ref 3.5–5.3)
POTASSIUM SERPL-SCNC: 3.7 MMOL/L — SIGNIFICANT CHANGE UP (ref 3.5–5.3)
POTASSIUM SERPL-SCNC: 4.1 MMOL/L — SIGNIFICANT CHANGE UP (ref 3.5–5.3)
PROT UR-MCNC: ABNORMAL MG/DL
RBC # BLD: 2.42 M/UL — LOW (ref 4.2–5.8)
RBC # FLD: 16.8 % — HIGH (ref 10.3–14.5)
RBC CASTS # UR COMP ASSIST: < 5 /HPF — SIGNIFICANT CHANGE UP
SODIUM SERPL-SCNC: 143 MMOL/L — SIGNIFICANT CHANGE UP (ref 135–145)
SODIUM SERPL-SCNC: 145 MMOL/L — SIGNIFICANT CHANGE UP (ref 135–145)
SP GR SPEC: 1.01 — SIGNIFICANT CHANGE UP (ref 1–1.03)
SPECIMEN SOURCE: SIGNIFICANT CHANGE UP
UROBILINOGEN FLD QL: 0.2 E.U./DL — SIGNIFICANT CHANGE UP
WBC # BLD: 13.47 K/UL — HIGH (ref 3.8–10.5)
WBC # FLD AUTO: 13.47 K/UL — HIGH (ref 3.8–10.5)
WBC UR QL: < 5 /HPF — SIGNIFICANT CHANGE UP

## 2023-06-17 PROCEDURE — 71045 X-RAY EXAM CHEST 1 VIEW: CPT | Mod: 26

## 2023-06-17 PROCEDURE — 36000 PLACE NEEDLE IN VEIN: CPT

## 2023-06-17 PROCEDURE — 99232 SBSQ HOSP IP/OBS MODERATE 35: CPT

## 2023-06-17 RX ORDER — OXYCODONE HYDROCHLORIDE 5 MG/1
30 TABLET ORAL EVERY 4 HOURS
Refills: 0 | Status: DISCONTINUED | OUTPATIENT
Start: 2023-06-17 | End: 2023-06-19

## 2023-06-17 RX ORDER — FUROSEMIDE 40 MG
80 TABLET ORAL ONCE
Refills: 0 | Status: COMPLETED | OUTPATIENT
Start: 2023-06-17 | End: 2023-06-17

## 2023-06-17 RX ORDER — POTASSIUM CHLORIDE 20 MEQ
20 PACKET (EA) ORAL
Refills: 0 | Status: COMPLETED | OUTPATIENT
Start: 2023-06-17 | End: 2023-06-17

## 2023-06-17 RX ORDER — ERTAPENEM SODIUM 1 G/1
1000 INJECTION, POWDER, LYOPHILIZED, FOR SOLUTION INTRAMUSCULAR; INTRAVENOUS EVERY 24 HOURS
Refills: 0 | Status: COMPLETED | OUTPATIENT
Start: 2023-06-17 | End: 2023-06-17

## 2023-06-17 RX ORDER — ACETAMINOPHEN 500 MG
1000 TABLET ORAL ONCE
Refills: 0 | Status: DISCONTINUED | OUTPATIENT
Start: 2023-06-17 | End: 2023-06-17

## 2023-06-17 RX ORDER — FUROSEMIDE 40 MG
80 TABLET ORAL ONCE
Refills: 0 | Status: DISCONTINUED | OUTPATIENT
Start: 2023-06-17 | End: 2023-06-17

## 2023-06-17 RX ORDER — CHLOROTHIAZIDE 500 MG
500 TABLET ORAL ONCE
Refills: 0 | Status: COMPLETED | OUTPATIENT
Start: 2023-06-17 | End: 2023-06-17

## 2023-06-17 RX ORDER — CASPOFUNGIN ACETATE 7 MG/ML
INJECTION, POWDER, LYOPHILIZED, FOR SOLUTION INTRAVENOUS
Refills: 0 | Status: DISCONTINUED | OUTPATIENT
Start: 2023-06-18 | End: 2023-06-20

## 2023-06-17 RX ORDER — LIDOCAINE HCL 20 MG/ML
10 VIAL (ML) INJECTION ONCE
Refills: 0 | Status: COMPLETED | OUTPATIENT
Start: 2023-06-17 | End: 2023-06-17

## 2023-06-17 RX ORDER — MAGNESIUM SULFATE 500 MG/ML
1 VIAL (ML) INJECTION ONCE
Refills: 0 | Status: COMPLETED | OUTPATIENT
Start: 2023-06-17 | End: 2023-06-17

## 2023-06-17 RX ORDER — OXYCODONE HYDROCHLORIDE 5 MG/1
30 TABLET ORAL EVERY 4 HOURS
Refills: 0 | Status: DISCONTINUED | OUTPATIENT
Start: 2023-06-17 | End: 2023-06-17

## 2023-06-17 RX ADMIN — OXYCODONE HYDROCHLORIDE 30 MILLIGRAM(S): 5 TABLET ORAL at 15:20

## 2023-06-17 RX ADMIN — Medication 1 DROP(S): at 19:06

## 2023-06-17 RX ADMIN — Medication 80 MILLIGRAM(S): at 22:09

## 2023-06-17 RX ADMIN — HYDROMORPHONE HYDROCHLORIDE 1 MILLIGRAM(S): 2 INJECTION INTRAMUSCULAR; INTRAVENOUS; SUBCUTANEOUS at 23:40

## 2023-06-17 RX ADMIN — ERTAPENEM SODIUM 120 MILLIGRAM(S): 1 INJECTION, POWDER, LYOPHILIZED, FOR SOLUTION INTRAMUSCULAR; INTRAVENOUS at 22:09

## 2023-06-17 RX ADMIN — OXYCODONE HYDROCHLORIDE 40 MILLIGRAM(S): 5 TABLET ORAL at 00:45

## 2023-06-17 RX ADMIN — Medication 10 MILLILITER(S): at 16:52

## 2023-06-17 RX ADMIN — HYDROMORPHONE HYDROCHLORIDE 1 MILLIGRAM(S): 2 INJECTION INTRAMUSCULAR; INTRAVENOUS; SUBCUTANEOUS at 16:40

## 2023-06-17 RX ADMIN — OXYCODONE HYDROCHLORIDE 30 MILLIGRAM(S): 5 TABLET ORAL at 16:20

## 2023-06-17 RX ADMIN — Medication 2.5 MILLIGRAM(S): at 13:00

## 2023-06-17 RX ADMIN — LACOSAMIDE 250 MILLIGRAM(S): 50 TABLET ORAL at 06:14

## 2023-06-17 RX ADMIN — Medication 3 MILLILITER(S): at 21:42

## 2023-06-17 RX ADMIN — HYDROMORPHONE HYDROCHLORIDE 1 MILLIGRAM(S): 2 INJECTION INTRAMUSCULAR; INTRAVENOUS; SUBCUTANEOUS at 23:25

## 2023-06-17 RX ADMIN — Medication 2.5 MILLIGRAM(S): at 17:32

## 2023-06-17 RX ADMIN — LACOSAMIDE 250 MILLIGRAM(S): 50 TABLET ORAL at 18:17

## 2023-06-17 RX ADMIN — OXYCODONE HYDROCHLORIDE 30 MILLIGRAM(S): 5 TABLET ORAL at 21:47

## 2023-06-17 RX ADMIN — Medication 975 MILLIGRAM(S): at 17:33

## 2023-06-17 RX ADMIN — OXYCODONE HYDROCHLORIDE 40 MILLIGRAM(S): 5 TABLET ORAL at 00:30

## 2023-06-17 RX ADMIN — Medication 100 GRAM(S): at 15:23

## 2023-06-17 RX ADMIN — Medication 3 MILLILITER(S): at 04:26

## 2023-06-17 RX ADMIN — CHLORHEXIDINE GLUCONATE 15 MILLILITER(S): 213 SOLUTION TOPICAL at 17:33

## 2023-06-17 RX ADMIN — HEPARIN SODIUM 5000 UNIT(S): 5000 INJECTION INTRAVENOUS; SUBCUTANEOUS at 21:47

## 2023-06-17 RX ADMIN — HYDROMORPHONE HYDROCHLORIDE 1 MILLIGRAM(S): 2 INJECTION INTRAMUSCULAR; INTRAVENOUS; SUBCUTANEOUS at 16:27

## 2023-06-17 RX ADMIN — Medication 3 MILLILITER(S): at 17:32

## 2023-06-17 RX ADMIN — CHLORHEXIDINE GLUCONATE 1 APPLICATION(S): 213 SOLUTION TOPICAL at 06:13

## 2023-06-17 RX ADMIN — SODIUM CHLORIDE 4 MILLILITER(S): 9 INJECTION INTRAMUSCULAR; INTRAVENOUS; SUBCUTANEOUS at 17:32

## 2023-06-17 RX ADMIN — SODIUM CHLORIDE 4 MILLILITER(S): 9 INJECTION INTRAMUSCULAR; INTRAVENOUS; SUBCUTANEOUS at 04:29

## 2023-06-17 RX ADMIN — CHLORHEXIDINE GLUCONATE 15 MILLILITER(S): 213 SOLUTION TOPICAL at 06:13

## 2023-06-17 RX ADMIN — Medication 2 MILLIGRAM(S): at 13:00

## 2023-06-17 RX ADMIN — Medication 975 MILLIGRAM(S): at 13:57

## 2023-06-17 RX ADMIN — OXYCODONE HYDROCHLORIDE 30 MILLIGRAM(S): 5 TABLET ORAL at 22:07

## 2023-06-17 RX ADMIN — OXYCODONE HYDROCHLORIDE 30 MILLIGRAM(S): 5 TABLET ORAL at 19:06

## 2023-06-17 RX ADMIN — Medication 975 MILLIGRAM(S): at 23:09

## 2023-06-17 RX ADMIN — Medication 81 MILLIGRAM(S): at 12:59

## 2023-06-17 RX ADMIN — HEPARIN SODIUM 5000 UNIT(S): 5000 INJECTION INTRAVENOUS; SUBCUTANEOUS at 14:00

## 2023-06-17 RX ADMIN — Medication 975 MILLIGRAM(S): at 12:57

## 2023-06-17 RX ADMIN — Medication 15 MILLILITER(S): at 13:32

## 2023-06-17 RX ADMIN — Medication 975 MILLIGRAM(S): at 23:25

## 2023-06-17 RX ADMIN — Medication 975 MILLIGRAM(S): at 06:33

## 2023-06-17 RX ADMIN — Medication 1 DROP(S): at 06:15

## 2023-06-17 RX ADMIN — Medication 2.5 MILLIGRAM(S): at 06:16

## 2023-06-17 RX ADMIN — Medication 975 MILLIGRAM(S): at 18:33

## 2023-06-17 RX ADMIN — Medication 3 MILLILITER(S): at 09:54

## 2023-06-17 RX ADMIN — Medication 20 MILLIEQUIVALENT(S): at 21:47

## 2023-06-17 RX ADMIN — OXYCODONE HYDROCHLORIDE 40 MILLIGRAM(S): 5 TABLET ORAL at 04:55

## 2023-06-17 RX ADMIN — Medication 20 MILLIEQUIVALENT(S): at 23:09

## 2023-06-17 RX ADMIN — Medication 2 MILLIGRAM(S): at 23:10

## 2023-06-17 RX ADMIN — OXYCODONE HYDROCHLORIDE 30 MILLIGRAM(S): 5 TABLET ORAL at 20:00

## 2023-06-17 RX ADMIN — OXYCODONE HYDROCHLORIDE 40 MILLIGRAM(S): 5 TABLET ORAL at 09:53

## 2023-06-17 RX ADMIN — Medication 2.5 MILLIGRAM(S): at 23:10

## 2023-06-17 RX ADMIN — PANTOPRAZOLE SODIUM 40 MILLIGRAM(S): 20 TABLET, DELAYED RELEASE ORAL at 12:57

## 2023-06-17 RX ADMIN — SODIUM CHLORIDE 4 MILLILITER(S): 9 INJECTION INTRAMUSCULAR; INTRAVENOUS; SUBCUTANEOUS at 21:42

## 2023-06-17 RX ADMIN — Medication 100 MILLIGRAM(S): at 12:56

## 2023-06-17 RX ADMIN — OXYCODONE HYDROCHLORIDE 40 MILLIGRAM(S): 5 TABLET ORAL at 10:50

## 2023-06-17 RX ADMIN — Medication 975 MILLIGRAM(S): at 06:13

## 2023-06-17 RX ADMIN — OXYCODONE HYDROCHLORIDE 40 MILLIGRAM(S): 5 TABLET ORAL at 04:40

## 2023-06-17 RX ADMIN — HEPARIN SODIUM 5000 UNIT(S): 5000 INJECTION INTRAVENOUS; SUBCUTANEOUS at 06:13

## 2023-06-17 RX ADMIN — SODIUM CHLORIDE 4 MILLILITER(S): 9 INJECTION INTRAMUSCULAR; INTRAVENOUS; SUBCUTANEOUS at 09:54

## 2023-06-17 RX ADMIN — Medication 80 MILLIGRAM(S): at 10:57

## 2023-06-17 NOTE — PROVIDER CONTACT NOTE (CHANGE IN STATUS NOTIFICATION) - BACKGROUND
Pt admitted for endoleak s/p multiple IR drain placements, washout and etc. Pt s/p second TEVAR, ex lap, multiple IR drainage and etc. w/ hx of htn, seizure disorder, CAD, HTN

## 2023-06-17 NOTE — PROCEDURE NOTE - GENERAL PROCEDURE DETAILS
Prepped skin with chlorhexidine. Instill5 CC 2% lidocaine. Removed old silk stitch. Resecured J Tube with polypropylene stitch x2. Flushed tube.
Lumbar drain clamped. Anchoring sutures removed. Lumbar drain removed without difficulty, tip visualized. Wound closed with monocryl suture x1. No evidence of leaking. Gauze and tegaderm applied. Patient to remain flat for one hour.

## 2023-06-17 NOTE — PROGRESS NOTE ADULT - ASSESSMENT
This is a 53yMale w/ PMHx of HTN, type A aortic dissection s/p Dacron grafts, AV resuspension in 2013, CAD s/p CABG x 1 SVG to RCA (5/2013 with Dr. Medina), seizure disorder, recent admission 3/20-4/14 for replacement of transverse aortic arch, second stage TEVAR and aorto-axillary bypass, AV replacement (bio 23mm), and CABG x 1 (SVG-RCA). Pt found to have endo leak and taken to OR for TEVAR revision on 5/5, Post op course c/b Klebsiella bacteremia (5/15), resp failure requiring intubation on 5/18, Mucus plugging s/p Bronch 5/19 and PEA arrest. Post operatively pt also found to have hemorrhagic pancreatitis with venu-pancreatic abscess possibly 2* to Depakote therefore s/p IR aspiration of peripancreatic fluid collection and paracentesis on 5/22, IR venu-pancreatic abscess drainage and placement of drainage catheter on 5/24. Pt then transferred from CTICU to SICU for further mgmt of hemorrhagic pancreatitis on 5/25. s/p IR placement of 2 new drainage catheters and catheter exchange on 5/26. LUQ drainage 5/30. OR 5/31 exlap washout & replacement of 3 new JPs and abthera vac with open abdomen. RTOR 6/1, no bleeding, evac of old blood, placement of feeding J-tube. failed extubation s/p trach 6/5    NEURO: Pain control: [id number] w/ additional PRN dilaudid, d/c'd fent gtt, Precedex gtt, d/c'd ketamine (6/14). Hx seizures: epilepsy recs appreciated, Cont vimpat. Depakote weaned off due to concerns for drug-related hemorrhagic pancreatitis. Repeat EEG (ordered)  HEENT: Artificial tears, Torticollis - PT/OT following   CV: MAP > 65. s/p PEA arrest on 5/24 night. TTE (6/14) LVEF 75%, LVH, biatrial enlargement and elevated PA pressure (elevated from previous), mild to mod MR/TR . C/w ASA 81mg. Metoprolol 2.5q6. Diurese as needed with Lasix 80BID, chlorthiazide.   PULM: ARDS resolved - off NO, s/p multiple Mucus plug/ Lung collapse s/p bronch x3 (most recently on 5/26) most likely from outward obstruction: Cont rotating pt around the clock. Cont Duonebs, 3% saline. failed extubation 6/3: s/p Trach 6/5: Tolerating 40% Trach Collar (backup CPAP 8/PS 5)  GI/FEN: TF Vital 1.0 at 70cc via feeding J-tube with imodium 2mg BID, Protonix BID, Hemorrhagic pancreatitis: s/p multiple drain placements and paracentisis by IR team. Will remove every other staple today.   : AKF on HD s/p CVVHD, now w/ renal recovery, off of aquaphoresis for fluid overload. condom catheter. Lasix 80mg & chlorthiazide 500 mg IV. Repeat BMP at 1600.   HEME: Acute blood loss anemia requiring multiple transfusions  ENDO: mISS  ID: Ertapenem (6/13-6/16)// meropenem (6/9-6/13) vanco x 1 (6/9), caspofungin (6/9-6/12), previous Kleb bacteremia from 5/15 & 5/17, subsequent bld cx negative, PNA w/ bronch cx kleb, enterobacter (zosyn, erta resistant), E faecalis, strep angionosus from 5/19, pancreatic abscess cx pan-sensitive kleb 5/22. completed Ceftriaxone( 6/5- 6/15) Chloe (5/21-6/5),  Caspo (5/23-5/30), Ampicillin (5/21- 5/27).   PPX: SCDs, SQH  LINES: PIVs // L rad kalpana (5/31-6/15), 5Fr PIV in LUE (6/13-15), Lsubclav HD Cath (5/31-6/12), RIJ TLC (5/22-5/27), RIJ HD cath (5/22-31), R Ax kalpana(5/12-5/31), [facility]J TLC (5/23-6/1), RIJ TLC (6/1-13)   WOUNDS/DRAINS: right OR CODIE, 2 left OR CODIE, left IR hematoma drain (bilious), left IR pancreatic drain  PT: PT/OT ordered 6/5. Out of bed to stretcher chair  Dispo: SICU level of care.

## 2023-06-17 NOTE — PROGRESS NOTE ADULT - ASSESSMENT
53yMale w/ PMHx of HTN, type A aortic dissection s/p Dacron grafts, AV resuspension in 2013, CAD s/p CABG x 1 SVG to RCA (5/2013 with Dr. Medina), seizure disorder, recent admission 3/20-4/14 for replacement of transverse aortic arch, second stage TEVAR and aorto-axillary bypass, AV replacement (bio 23mm), and CABG x 1 (SVG-RCA). Pt found to have endo leak and taken to OR for TEVAR revision on 5/5, Post op course c/b Klebsiella bacteremia (5/15), resp failure requiring intubation on 5/18, Mucus plugging s/p Bronch 5/19 and PEA arrest. Post operatively pt also found to have hemorrhagic pancreatitis with venu-pancreatic abscess possibly 2* to Depakote therefore s/p IR aspiration of peripancreatic fluid collection and paracentesis on 5/22, IR venu-pancreatic abscess drainage and placement of drainage catheter on 5/24. Pt then transferred from CTICU to SICU for further mgmt of hemorrhagic pancreatitis on 5/25. s/p IR placement of 2 new drainage catheters and catheter exchange on 5/26. LUQ drainage 5/30. OR 5/31 exlap washout & replacement of 3 new JPs and abthera vac with open abdomen. RTOR 6/1, no bleeding, evac of old blood, placement of feeding J-tube. failed extubation s/p trach 6/5    -Trach collar as tolerated  -OOB daily  -Cont Tube feeds  -Flush both hematoma and retroperitoneal drains daily  -Ween oxycodone as tolerated  -Rest of care per SICU

## 2023-06-17 NOTE — PROVIDER CONTACT NOTE (CHANGE IN STATUS NOTIFICATION) - SITUATION
Patient tachycardic to 160's continues to be febrile.
Pt suture on Jtube came off.
Pt febrile 100.9F
Patient postexlap, TEVAR. Plan to extubate. No sedation, fentanyl, precedex off.   Post extubation, patient became tachycardic with sudden increase of bloody secretions in the mouth and increase breathing workload.

## 2023-06-17 NOTE — PROVIDER CONTACT NOTE (CHANGE IN STATUS NOTIFICATION) - RECOMMENDATIONS
Notified provider. administer standing tylenol. ice pack
Notified provider. taped J tube.
Reintubation

## 2023-06-17 NOTE — PROGRESS NOTE ADULT - SUBJECTIVE AND OBJECTIVE BOX
General Surgery Progress Note    SUBJECTIVE:   Patient seen and examined at bedside by nati during AM rounds.    Vital Signs Last 24 Hrs  T(C): 37.6 (17 Jun 2023 06:01), Max: 37.9 (16 Jun 2023 09:41)  T(F): 99.7 (17 Jun 2023 06:01), Max: 100.2 (16 Jun 2023 09:41)  HR: 120 (17 Jun 2023 05:00) (112 - 130)  BP: 141/85 (17 Jun 2023 05:00) (110/68 - 154/92)  BP(mean): 107 (17 Jun 2023 05:00) (84 - 118)  RR: 15 (17 Jun 2023 05:00) (9 - 52)  SpO2: 100% (17 Jun 2023 05:00) (96% - 100%)    Parameters below as of 17 Jun 2023 06:00  Patient On (Oxygen Delivery Method): tracheostomy collar    O2 Concentration (%): 40    I&O's Summary    15 Vazquez 2023 07:01  -  16 Jun 2023 07:00  --------------------------------------------------------  IN: 2756.6 mL / OUT: 3259 mL / NET: -502.4 mL    16 Jun 2023 07:01  -  17 Jun 2023 06:31  --------------------------------------------------------  IN: 3050 mL / OUT: 1209 mL / NET: 1841 mL        Physical Exam:  General: Resting comfortably in bed, NAD  HEENT: ATNC  Pulmonary: Nonlabored breathing, no respiratory distress, no acessory muscle use noted  Cardiovascular: NSR  Abdomen: Soft, nondisteded, appropriate incisional tenderness  Extremities: WWP, SCDs in place, No significant edema appreciated    LABS:                        7.3    13.47 )-----------( 687      ( 17 Jun 2023 05:30 )             23.1     06-16    144  |  106  |  30<H>  ----------------------------<  89  4.6   |  30  |  0.71    Ca    8.6      16 Jun 2023 03:54  Phos  4.4     06-16  Mg     1.9     06-16            Plan:  -NPO/IVF - LR @ 100  -Antibiotics -  -SQH and SCDs  -OOB as tolerated/IS  -AM labs General Surgery Progress Note    SUBJECTIVE:   Patient seen and examined at bedside by chief during AM rounds. No acute events noted overnight. Patient lying comfortably in bed. Does not appear in distress.     Vital Signs Last 24 Hrs  T(C): 37.6 (17 Jun 2023 06:01), Max: 37.9 (16 Jun 2023 09:41)  T(F): 99.7 (17 Jun 2023 06:01), Max: 100.2 (16 Jun 2023 09:41)  HR: 120 (17 Jun 2023 05:00) (112 - 130)  BP: 141/85 (17 Jun 2023 05:00) (110/68 - 154/92)  BP(mean): 107 (17 Jun 2023 05:00) (84 - 118)  RR: 15 (17 Jun 2023 05:00) (9 - 52)  SpO2: 100% (17 Jun 2023 05:00) (96% - 100%)    Parameters below as of 17 Jun 2023 06:00  Patient On (Oxygen Delivery Method): tracheostomy collar    O2 Concentration (%): 40    I&O's Summary    15 Vazquez 2023 07:01  -  16 Jun 2023 07:00  --------------------------------------------------------  IN: 2756.6 mL / OUT: 3259 mL / NET: -502.4 mL    16 Jun 2023 07:01  -  17 Jun 2023 06:31  --------------------------------------------------------  IN: 3050 mL / OUT: 1209 mL / NET: 1841 mL        Physical Exam:  General: Resting comfortably in bed, NAD  HEENT: ATNC, trach in place  Pulmonary: Nonlabored breathing, no respiratory distress, no accessory muscle use noted, saturating well on trach collar  Cardiovascular: NSR  Abdomen: Soft, nondistended, mild generalized tenderness to palpation. No rebound no guarding Numerous CODIE drains noted to be SS and green-brown  Extremities: WWP, SCDs in place, No significant edema appreciated    LABS:                        7.3    13.47 )-----------( 687      ( 17 Jun 2023 05:30 )             23.1     06-16    144  |  106  |  30<H>  ----------------------------<  89  4.6   |  30  |  0.71    Ca    8.6      16 Jun 2023 03:54  Phos  4.4     06-16  Mg     1.9     06-16

## 2023-06-17 NOTE — PROVIDER CONTACT NOTE (CHANGE IN STATUS NOTIFICATION) - ACTION/TREATMENT ORDERED:
Given 4mg Versed, 100mg Fentany and started propofol, levophed gtt as per order.
Acetaminophen 1g iv administered. Hydromorphone 1mg iv administered. Cooling blanket applied.
Notified provider. As per provider will resuture jtube
Notified provider. administer tylenol and ice packs. fever work up. cultures. UA. xray. No other interventions at this time. Will continue to monitor.

## 2023-06-18 LAB
ALBUMIN SERPL ELPH-MCNC: 2.4 G/DL — LOW (ref 3.3–5)
ALP SERPL-CCNC: 118 U/L — SIGNIFICANT CHANGE UP (ref 40–120)
ALT FLD-CCNC: 12 U/L — SIGNIFICANT CHANGE UP (ref 10–45)
ANION GAP SERPL CALC-SCNC: 8 MMOL/L — SIGNIFICANT CHANGE UP (ref 5–17)
ANISOCYTOSIS BLD QL: SIGNIFICANT CHANGE UP
AST SERPL-CCNC: 19 U/L — SIGNIFICANT CHANGE UP (ref 10–40)
BASOPHILS # BLD AUTO: 0 K/UL — SIGNIFICANT CHANGE UP (ref 0–0.2)
BASOPHILS NFR BLD AUTO: 0 % — SIGNIFICANT CHANGE UP (ref 0–2)
BILIRUB DIRECT SERPL-MCNC: 0.3 MG/DL — SIGNIFICANT CHANGE UP (ref 0–0.3)
BILIRUB INDIRECT FLD-MCNC: 0.3 MG/DL — SIGNIFICANT CHANGE UP (ref 0.2–1)
BILIRUB SERPL-MCNC: 0.6 MG/DL — SIGNIFICANT CHANGE UP (ref 0.2–1.2)
BLD GP AB SCN SERPL QL: NEGATIVE — SIGNIFICANT CHANGE UP
BUN SERPL-MCNC: 27 MG/DL — HIGH (ref 7–23)
CALCIUM SERPL-MCNC: 8.2 MG/DL — LOW (ref 8.4–10.5)
CHLORIDE SERPL-SCNC: 107 MMOL/L — SIGNIFICANT CHANGE UP (ref 96–108)
CO2 SERPL-SCNC: 30 MMOL/L — SIGNIFICANT CHANGE UP (ref 22–31)
CREAT SERPL-MCNC: 0.72 MG/DL — SIGNIFICANT CHANGE UP (ref 0.5–1.3)
EGFR: 109 ML/MIN/1.73M2 — SIGNIFICANT CHANGE UP
EOSINOPHIL # BLD AUTO: 0 K/UL — SIGNIFICANT CHANGE UP (ref 0–0.5)
EOSINOPHIL NFR BLD AUTO: 0 % — SIGNIFICANT CHANGE UP (ref 0–6)
GLUCOSE BLDC GLUCOMTR-MCNC: 110 MG/DL — HIGH (ref 70–99)
GLUCOSE BLDC GLUCOMTR-MCNC: 113 MG/DL — HIGH (ref 70–99)
GLUCOSE BLDC GLUCOMTR-MCNC: 118 MG/DL — HIGH (ref 70–99)
GLUCOSE BLDC GLUCOMTR-MCNC: 122 MG/DL — HIGH (ref 70–99)
GLUCOSE SERPL-MCNC: 105 MG/DL — HIGH (ref 70–99)
HCT VFR BLD CALC: 23.4 % — LOW (ref 39–50)
HGB BLD-MCNC: 7.3 G/DL — LOW (ref 13–17)
HYPOCHROMIA BLD QL: SIGNIFICANT CHANGE UP
LYMPHOCYTES # BLD AUTO: 0.19 K/UL — LOW (ref 1–3.3)
LYMPHOCYTES # BLD AUTO: 0.9 % — LOW (ref 13–44)
MACROCYTES BLD QL: SLIGHT — SIGNIFICANT CHANGE UP
MAGNESIUM SERPL-MCNC: 2 MG/DL — SIGNIFICANT CHANGE UP (ref 1.6–2.6)
MANUAL SMEAR VERIFICATION: SIGNIFICANT CHANGE UP
MCHC RBC-ENTMCNC: 29.8 PG — SIGNIFICANT CHANGE UP (ref 27–34)
MCHC RBC-ENTMCNC: 31.2 GM/DL — LOW (ref 32–36)
MCV RBC AUTO: 95.5 FL — SIGNIFICANT CHANGE UP (ref 80–100)
METAMYELOCYTES # FLD: 0.9 % — HIGH (ref 0–0)
MICROCYTES BLD QL: SLIGHT — SIGNIFICANT CHANGE UP
MONOCYTES # BLD AUTO: 1.68 K/UL — HIGH (ref 0–0.9)
MONOCYTES NFR BLD AUTO: 7.8 % — SIGNIFICANT CHANGE UP (ref 2–14)
NEUTROPHILS # BLD AUTO: 19.45 K/UL — HIGH (ref 1.8–7.4)
NEUTROPHILS NFR BLD AUTO: 90.4 % — HIGH (ref 43–77)
OVALOCYTES BLD QL SMEAR: SIGNIFICANT CHANGE UP
PHOSPHATE SERPL-MCNC: 4.8 MG/DL — HIGH (ref 2.5–4.5)
PLAT MORPH BLD: NORMAL — SIGNIFICANT CHANGE UP
PLATELET # BLD AUTO: 697 K/UL — HIGH (ref 150–400)
POIKILOCYTOSIS BLD QL AUTO: SIGNIFICANT CHANGE UP
POLYCHROMASIA BLD QL SMEAR: SLIGHT — SIGNIFICANT CHANGE UP
POTASSIUM SERPL-MCNC: 4 MMOL/L — SIGNIFICANT CHANGE UP (ref 3.5–5.3)
POTASSIUM SERPL-SCNC: 4 MMOL/L — SIGNIFICANT CHANGE UP (ref 3.5–5.3)
PROT SERPL-MCNC: 6.1 G/DL — SIGNIFICANT CHANGE UP (ref 6–8.3)
RBC # BLD: 2.45 M/UL — LOW (ref 4.2–5.8)
RBC # FLD: 17.1 % — HIGH (ref 10.3–14.5)
RBC BLD AUTO: ABNORMAL
RH IG SCN BLD-IMP: POSITIVE — SIGNIFICANT CHANGE UP
SCHISTOCYTES BLD QL AUTO: SLIGHT — SIGNIFICANT CHANGE UP
SODIUM SERPL-SCNC: 145 MMOL/L — SIGNIFICANT CHANGE UP (ref 135–145)
SPHEROCYTES BLD QL SMEAR: SLIGHT — SIGNIFICANT CHANGE UP
TARGETS BLD QL SMEAR: SLIGHT — SIGNIFICANT CHANGE UP
WBC # BLD: 21.52 K/UL — HIGH (ref 3.8–10.5)
WBC # FLD AUTO: 21.52 K/UL — HIGH (ref 3.8–10.5)

## 2023-06-18 PROCEDURE — 99233 SBSQ HOSP IP/OBS HIGH 50: CPT

## 2023-06-18 PROCEDURE — 71045 X-RAY EXAM CHEST 1 VIEW: CPT | Mod: 26

## 2023-06-18 RX ORDER — VANCOMYCIN HCL 1 G
1250 VIAL (EA) INTRAVENOUS EVERY 12 HOURS
Refills: 0 | Status: DISCONTINUED | OUTPATIENT
Start: 2023-06-18 | End: 2023-06-19

## 2023-06-18 RX ORDER — CASPOFUNGIN ACETATE 7 MG/ML
70 INJECTION, POWDER, LYOPHILIZED, FOR SOLUTION INTRAVENOUS ONCE
Refills: 0 | Status: COMPLETED | OUTPATIENT
Start: 2023-06-18 | End: 2023-06-18

## 2023-06-18 RX ORDER — CASPOFUNGIN ACETATE 7 MG/ML
50 INJECTION, POWDER, LYOPHILIZED, FOR SOLUTION INTRAVENOUS EVERY 24 HOURS
Refills: 0 | Status: DISCONTINUED | OUTPATIENT
Start: 2023-06-19 | End: 2023-06-20

## 2023-06-18 RX ORDER — ERTAPENEM SODIUM 1 G/1
1000 INJECTION, POWDER, LYOPHILIZED, FOR SOLUTION INTRAMUSCULAR; INTRAVENOUS EVERY 24 HOURS
Refills: 0 | Status: COMPLETED | OUTPATIENT
Start: 2023-06-18 | End: 2023-06-25

## 2023-06-18 RX ADMIN — HEPARIN SODIUM 5000 UNIT(S): 5000 INJECTION INTRAVENOUS; SUBCUTANEOUS at 22:00

## 2023-06-18 RX ADMIN — LACOSAMIDE 250 MILLIGRAM(S): 50 TABLET ORAL at 18:51

## 2023-06-18 RX ADMIN — Medication 1 DROP(S): at 19:34

## 2023-06-18 RX ADMIN — CHLORHEXIDINE GLUCONATE 1 APPLICATION(S): 213 SOLUTION TOPICAL at 06:19

## 2023-06-18 RX ADMIN — OXYCODONE HYDROCHLORIDE 30 MILLIGRAM(S): 5 TABLET ORAL at 06:17

## 2023-06-18 RX ADMIN — CHLORHEXIDINE GLUCONATE 15 MILLILITER(S): 213 SOLUTION TOPICAL at 18:50

## 2023-06-18 RX ADMIN — CHLORHEXIDINE GLUCONATE 15 MILLILITER(S): 213 SOLUTION TOPICAL at 06:17

## 2023-06-18 RX ADMIN — SODIUM CHLORIDE 4 MILLILITER(S): 9 INJECTION INTRAMUSCULAR; INTRAVENOUS; SUBCUTANEOUS at 09:23

## 2023-06-18 RX ADMIN — SODIUM CHLORIDE 4 MILLILITER(S): 9 INJECTION INTRAMUSCULAR; INTRAVENOUS; SUBCUTANEOUS at 22:21

## 2023-06-18 RX ADMIN — OXYCODONE HYDROCHLORIDE 30 MILLIGRAM(S): 5 TABLET ORAL at 02:03

## 2023-06-18 RX ADMIN — OXYCODONE HYDROCHLORIDE 30 MILLIGRAM(S): 5 TABLET ORAL at 15:09

## 2023-06-18 RX ADMIN — OXYCODONE HYDROCHLORIDE 30 MILLIGRAM(S): 5 TABLET ORAL at 06:30

## 2023-06-18 RX ADMIN — Medication 975 MILLIGRAM(S): at 06:30

## 2023-06-18 RX ADMIN — LACOSAMIDE 250 MILLIGRAM(S): 50 TABLET ORAL at 06:18

## 2023-06-18 RX ADMIN — Medication 3 MILLILITER(S): at 22:22

## 2023-06-18 RX ADMIN — Medication 81 MILLIGRAM(S): at 13:40

## 2023-06-18 RX ADMIN — Medication 1 DROP(S): at 06:19

## 2023-06-18 RX ADMIN — SODIUM CHLORIDE 4 MILLILITER(S): 9 INJECTION INTRAMUSCULAR; INTRAVENOUS; SUBCUTANEOUS at 05:01

## 2023-06-18 RX ADMIN — OXYCODONE HYDROCHLORIDE 30 MILLIGRAM(S): 5 TABLET ORAL at 23:00

## 2023-06-18 RX ADMIN — Medication 975 MILLIGRAM(S): at 06:17

## 2023-06-18 RX ADMIN — ERTAPENEM SODIUM 120 MILLIGRAM(S): 1 INJECTION, POWDER, LYOPHILIZED, FOR SOLUTION INTRAMUSCULAR; INTRAVENOUS at 22:20

## 2023-06-18 RX ADMIN — Medication 2.5 MILLIGRAM(S): at 18:50

## 2023-06-18 RX ADMIN — OXYCODONE HYDROCHLORIDE 30 MILLIGRAM(S): 5 TABLET ORAL at 11:22

## 2023-06-18 RX ADMIN — OXYCODONE HYDROCHLORIDE 30 MILLIGRAM(S): 5 TABLET ORAL at 11:45

## 2023-06-18 RX ADMIN — Medication 15 MILLILITER(S): at 12:08

## 2023-06-18 RX ADMIN — Medication 975 MILLIGRAM(S): at 12:15

## 2023-06-18 RX ADMIN — OXYCODONE HYDROCHLORIDE 30 MILLIGRAM(S): 5 TABLET ORAL at 15:30

## 2023-06-18 RX ADMIN — Medication 975 MILLIGRAM(S): at 11:45

## 2023-06-18 RX ADMIN — CASPOFUNGIN ACETATE 260 MILLIGRAM(S): 7 INJECTION, POWDER, LYOPHILIZED, FOR SOLUTION INTRAVENOUS at 01:44

## 2023-06-18 RX ADMIN — HEPARIN SODIUM 5000 UNIT(S): 5000 INJECTION INTRAVENOUS; SUBCUTANEOUS at 13:40

## 2023-06-18 RX ADMIN — Medication 2.5 MILLIGRAM(S): at 12:08

## 2023-06-18 RX ADMIN — PANTOPRAZOLE SODIUM 40 MILLIGRAM(S): 20 TABLET, DELAYED RELEASE ORAL at 11:45

## 2023-06-18 RX ADMIN — OXYCODONE HYDROCHLORIDE 30 MILLIGRAM(S): 5 TABLET ORAL at 03:20

## 2023-06-18 RX ADMIN — SODIUM CHLORIDE 4 MILLILITER(S): 9 INJECTION INTRAMUSCULAR; INTRAVENOUS; SUBCUTANEOUS at 16:29

## 2023-06-18 RX ADMIN — CASPOFUNGIN ACETATE 260 MILLIGRAM(S): 7 INJECTION, POWDER, LYOPHILIZED, FOR SOLUTION INTRAVENOUS at 23:59

## 2023-06-18 RX ADMIN — Medication 975 MILLIGRAM(S): at 18:50

## 2023-06-18 RX ADMIN — Medication 3 MILLILITER(S): at 16:29

## 2023-06-18 RX ADMIN — HEPARIN SODIUM 5000 UNIT(S): 5000 INJECTION INTRAVENOUS; SUBCUTANEOUS at 06:19

## 2023-06-18 RX ADMIN — Medication 3 MILLILITER(S): at 09:22

## 2023-06-18 RX ADMIN — OXYCODONE HYDROCHLORIDE 30 MILLIGRAM(S): 5 TABLET ORAL at 22:00

## 2023-06-18 RX ADMIN — Medication 3 MILLILITER(S): at 04:58

## 2023-06-18 RX ADMIN — Medication 166.67 MILLIGRAM(S): at 15:23

## 2023-06-18 RX ADMIN — Medication 975 MILLIGRAM(S): at 19:15

## 2023-06-18 RX ADMIN — Medication 975 MILLIGRAM(S): at 23:25

## 2023-06-18 RX ADMIN — Medication 2 MILLIGRAM(S): at 11:47

## 2023-06-18 NOTE — PROGRESS NOTE ADULT - SUBJECTIVE AND OBJECTIVE BOX
INTERVAL HPI/OVERNIGHT EVENTS: Fever and leukocytosis; increased tracheal secretions.    ROS: UTO      ANTIBIOTICS/RELEVANT:    MEDICATIONS  (STANDING):  acetaminophen   Oral Liquid .. 975 milliGRAM(s) Enteral Tube every 6 hours  albuterol/ipratropium for Nebulization 3 milliLiter(s) Nebulizer every 6 hours  artificial  tears Solution 1 Drop(s) Both EYES two times a day  aspirin  chewable 81 milliGRAM(s) Enteral Tube every 24 hours  caspofungin IVPB      chlorhexidine 0.12% Liquid 15 milliLiter(s) Oral Mucosa every 12 hours  chlorhexidine 2% Cloths 1 Application(s) Topical daily  dextrose 5%. 1000 milliLiter(s) (100 mL/Hr) IV Continuous <Continuous>  dextrose 50% Injectable 25 Gram(s) IV Push once  ertapenem  IVPB 1000 milliGRAM(s) IV Intermittent every 24 hours  glucagon  Injectable 1 milliGRAM(s) IntraMuscular once  heparin   Injectable 5000 Unit(s) SubCutaneous every 8 hours  insulin lispro (ADMELOG) corrective regimen sliding scale   SubCutaneous every 6 hours  lacosamide Solution 250 milliGRAM(s) Oral two times a day  loperamide Liquid 2 milliGRAM(s) Oral every 12 hours  metoprolol tartrate Injectable 2.5 milliGRAM(s) IV Push every 6 hours  multivitamin/minerals/iron Oral Solution (CENTRUM) 15 milliLiter(s) Oral daily  oxyCODONE    Solution 30 milliGRAM(s) Oral every 4 hours  pantoprazole  Injectable 40 milliGRAM(s) IV Push daily  sodium chloride 3%  Inhalation 4 milliLiter(s) Inhalation every 6 hours  vancomycin  IVPB 1250 milliGRAM(s) IV Intermittent every 12 hours    MEDICATIONS  (PRN):  dextrose Oral Gel 15 Gram(s) Oral once PRN Blood Glucose LESS THAN 70 milliGRAM(s)/deciliter  HYDROmorphone  Injectable 1 milliGRAM(s) IV Push every 4 hours PRN breakthrough pain  sodium chloride 0.9% lock flush 10 milliLiter(s) IV Push every 1 hour PRN Pre/post blood products, medications, blood draw, and to maintain line patency        Vital Signs Last 24 Hrs  T(C): 37.4 (2023 14:00), Max: 38.7 (2023 21:55)  T(F): 99.3 (2023 14:00), Max: 101.7 (2023 21:55)  HR: 114 (2023 15:00) (80 - 141)  BP: 113/64 (2023 15:00) (91/53 - 158/86)  BP(mean): 84 (2023 15:00) (67 - 112)  RR: 26 (2023 15:00) (8 - 29)  SpO2: 100% (2023 15:00) (98% - 100%)    Parameters below as of 2023 15:00  Patient On (Oxygen Delivery Method): tracheostomy collar    O2 Concentration (%): 40    PHYSICAL EXAM:  Constitutional: NAD  Eyes: IVAN, EOMI  Ear/Nose/Throat: no oral lesion, no sinus tenderness on percussion	  Neck: no JVD, no lymphadenopathy, supple  Respiratory: CTA marti  Cardiovascular: S1S2 RRR, no murmurs  Gastrointestinal:soft, (+) BS, no HSM  Extremities:no e/e/c  Vascular: DP Pulse:	right normal; left normal      LABS:                        7.3    21.52 )-----------( 697      ( 2023 05:30 )             23.4     06-18    145  |  107  |  27<H>  ----------------------------<  105<H>  4.0   |  30  |  0.72    Ca    8.2<L>      2023 05:30  Phos  4.8     06-18  Mg     2.0     06-18    TPro  6.1  /  Alb  2.4<L>  /  TBili  0.6  /  DBili  0.3  /  AST  19  /  ALT  12  /  AlkPhos  118  06-18      Urinalysis Basic - ( 2023 18:41 )    Color: Yellow / Appearance: Clear / S.015 / pH: x  Gluc: x / Ketone: NEGATIVE  / Bili: Negative / Urobili: 0.2 E.U./dL   Blood: x / Protein: Trace mg/dL / Nitrite: NEGATIVE   Leuk Esterase: NEGATIVE / RBC: < 5 /HPF / WBC < 5 /HPF   Sq Epi: x / Non Sq Epi: x / Bacteria: Present /HPF        MICROBIOLOGY: Culture - Sputum . (23 @ 21:51)    Gram Stain:   Rare epithelial cells  Few WBC's  Few Yeast  Rare Gram Positive Rods   Specimen Source: Trach Asp Tracheal Aspirate   Culture Results:   Normal Respiratory Loni present to date        RADIOLOGY & ADDITIONAL STUDIES: reviewed

## 2023-06-18 NOTE — PROGRESS NOTE ADULT - SUBJECTIVE AND OBJECTIVE BOX
INTERVAL/OVERNIGHT EVENTS: Febrile overnight so work up sent: UA negative, CXR L effusion, RLL? opacity, blood culture sent.     SUBJECTIVE: This AM, unable to provide ROS responds to stimulus and voice.     Neurologic Medications  acetaminophen   Oral Liquid .. 975 milliGRAM(s) Enteral Tube every 6 hours  HYDROmorphone  Injectable 1 milliGRAM(s) IV Push every 4 hours PRN breakthrough pain  lacosamide Solution 250 milliGRAM(s) Oral two times a day  oxyCODONE    Solution 30 milliGRAM(s) Oral every 4 hours    Respiratory Medications  albuterol/ipratropium for Nebulization 3 milliLiter(s) Nebulizer every 6 hours  sodium chloride 3%  Inhalation 4 milliLiter(s) Inhalation every 6 hours    Cardiovascular Medications  metoprolol tartrate Injectable 2.5 milliGRAM(s) IV Push every 6 hours    Gastrointestinal Medications  dextrose 5%. 1000 milliLiter(s) IV Continuous <Continuous>  loperamide Liquid 2 milliGRAM(s) Oral every 12 hours  multivitamin/minerals/iron Oral Solution (CENTRUM) 15 milliLiter(s) Oral daily  pantoprazole  Injectable 40 milliGRAM(s) IV Push daily  sodium chloride 0.9% lock flush 10 milliLiter(s) IV Push every 1 hour PRN Pre/post blood products, medications, blood draw, and to maintain line patency    Hematologic/Oncologic Medications  aspirin  chewable 81 milliGRAM(s) Enteral Tube every 24 hours  heparin   Injectable 5000 Unit(s) SubCutaneous every 8 hours    Antimicrobial/Immunologic Medications  caspofungin IVPB        Endocrine/Metabolic Medications  dextrose 50% Injectable 25 Gram(s) IV Push once  dextrose Oral Gel 15 Gram(s) Oral once PRN Blood Glucose LESS THAN 70 milliGRAM(s)/deciliter  glucagon  Injectable 1 milliGRAM(s) IntraMuscular once  insulin lispro (ADMELOG) corrective regimen sliding scale   SubCutaneous every 6 hours    Topical/Other Medications  artificial  tears Solution 1 Drop(s) Both EYES two times a day  chlorhexidine 0.12% Liquid 15 milliLiter(s) Oral Mucosa every 12 hours  chlorhexidine 2% Cloths 1 Application(s) Topical daily    MEDICATIONS  (PRN):  dextrose Oral Gel 15 Gram(s) Oral once PRN Blood Glucose LESS THAN 70 milliGRAM(s)/deciliter  HYDROmorphone  Injectable 1 milliGRAM(s) IV Push every 4 hours PRN breakthrough pain  sodium chloride 0.9% lock flush 10 milliLiter(s) IV Push every 1 hour PRN Pre/post blood products, medications, blood draw, and to maintain line patency    I&O's Detail    2023 07:01  -  2023 07:00  --------------------------------------------------------  IN:    Enteral Tube Flush: 670 mL    IV PiggyBack: 100 mL    IV PiggyBack: 50 mL    IV PiggyBack: 250 mL    Vital1.0: 1190 mL  Total IN: 2260 mL    OUT:    Bulb (mL): 50 mL    Bulb (mL): 20 mL    Bulb (mL): 10 mL    Drain (mL): 20 mL    Drain (mL): 0 mL    Voided (mL): 1300 mL  Total OUT: 1400 mL    Total NET: 860 mL    2023 07:01  -  2023 13:54  --------------------------------------------------------  IN:    Enteral Tube Flush: 100 mL    Vital1.0: 150 mL  Total IN: 250 mL    OUT:  Total OUT: 0 mL    Total NET: 250 mL    Vital Signs Last 24 Hrs  T(C): 37.2 (2023 09:23), Max: 38.7 (2023 21:55)  T(F): 99 (2023 09:23), Max: 101.7 (2023 21:55)  HR: 109 (2023 13:00) (80 - 141)  BP: 94/55 (2023 13:00) (91/53 - 158/86)  BP(mean): 69 (2023 13:00) (67 - 113)  RR: 21 (2023 13:00) (8 - 29)  SpO2: 100% (:00) (98% - 100%)    Parameters below as of 2023 13:00  Patient On (Oxygen Delivery Method): tracheostomy collar    O2 Concentration (%): 40    GENERAL: NAD, resting comfortably in bed, awakens to voice and stimuli, hands flicker to command  HEENT: NCAT, MMM, s/p trach w copious thick secretions  C/V: Normal rate, normal peripheral perfusion, systolic murmur  PULM: Nonlabored breathing, no respiratory distress, decreased sound bibasilar  ABD: Soft, ND, TTP but not peritonitic, drains SS green/brown tinge  EXTREM: WWP, dependent and extremities w 2+ edema, SCDs in place  NEURO: No focal deficits    LABS:                        7.3    21.52 )-----------( 697      ( 2023 05:30 )             23.4     06-18    145  |  107  |  27<H>  ----------------------------<  105<H>  4.0   |  30  |  0.72    Ca    8.2<L>      2023 05:30  Phos  4.8     06-18  Mg     2.0     -18    TPro  6.1  /  Alb  2.4<L>  /  TBili  0.6  /  DBili  0.3  /  AST  19  /  ALT  12  /  AlkPhos  118  06-18    Urinalysis Basic - ( 2023 18:41 )    Color: Yellow / Appearance: Clear / S.015 / pH: x  Gluc: x / Ketone: NEGATIVE  / Bili: Negative / Urobili: 0.2 E.U./dL   Blood: x / Protein: Trace mg/dL / Nitrite: NEGATIVE   Leuk Esterase: NEGATIVE / RBC: < 5 /HPF / WBC < 5 /HPF   Sq Epi: x / Non Sq Epi: x / Bacteria: Present /HPF    RADIOLOGY & ADDITIONAL STUDIES:  CT Abdomen and Pelvis w/ Oral Cont and w/ IV Cont:   ACC: [id number] EXAM:  CT ABDOMEN AND PELVIS OC IC   ORDERED BY: DEB OROPEZA     PROCEDURE DATE:  2023      INTERPRETATION:  INTERPRETATION:  CLINICAL INFORMATION: Evaluate drain   placement, abdominal collections    COMPARISON: 2023    CONTRAST/COMPLICATIONS:  IV Contrast: Isovue 370  0 cc administered   0 cc discarded  Oral Contrast: NONE  Complications: None reported at time of study completion    PROCEDURE:  CT of the Chest, Abdomen and Pelvis was performed.  Sagittal and coronal reformats were performed.    FINDINGS:  CHEST:  LUNGS AND LARGE AIRWAYS: Persistent bilateral lower lobe   consolidative/focal atelectatic changes, not significantly changed.  PLEURA: Small bilateral pleural effusions, grossly stable. Partially   loculated superiorly bilaterally  VESSELS: Stable position of thoracic aortic stent. Known thoracic aortic   aneurysm and dissection.. Post aortic valve replacement. Of note, the   dissection extends into the brachiocephalic artery, and left common   carotid artery proximally.  HEART: Normal heart size No pericardial effusion.  MEDIASTINUM AND MARJAN: No adenopathy  CHEST WALL AND LOWER NECK: Able tracheostomy tube    ABDOMEN AND PELVIS:  LIVER: Within normal limits.  BILE DUCTS: Normal caliber.  GALLBLADDER: Within normal limits.  SPLEEN: Stable splenomegaly fluid collection with areas of apparent   splenic infarction.  PANCREAS: Small, indeterminate 2.2 cm pancreatic head cystic focus. No   pancreatic ductal dilatation. Edematous changesnoted in pancreatic tail.  ADRENALS: Within normal limits.  KIDNEYS/URETERS: Within normal limits.    BLADDER: Within normal limits.  REPRODUCTIVE ORGANS: Uterus is not visualized. No adnexal masses.    BOWEL: No bowel obstruction. The colon is fluidfilled. No wall edema.   Stable position of jejunal tube.  PERITONEUM: Decreased pelvic fluid, nearly resolved. No loculated   collections to suggest an abscess. Stable position of percutaneous pelvic   drainage catheter.  VESSELS: Known aortic dissection  RETROPERITONEUM/LYMPH NODES: No lymphadenopathy.  ABDOMINAL WALL: No hernia. Anasarca  BONES: No suspicious lesions    IMPRESSION:  1.  Near complete resolution of pelvic free fluid. No loculated   intra-abdominal or pelvic collections suggest an abscess.  2.   Persistent bilateral lower lobe consolidative/focal atelectatic   changes with small bilateral pleural effusions.  3.  Known thoracoabdominal aortic dissection and thoracic aortic   endograft. Stable    --- End of Report ---    DORIS URIAS MD; Attending Radiologist  This document has been electronically signed. 2023  1:26PM (23 @ 10:41)    Culture - Sputum (collected 23 @ 21:51)  Source: Trach Asp Tracheal Aspirate  Gram Stain (23 @ 22:37):    Rare epithelial cells    Few WBC's    Few Yeast    Rare Gram Positive Rods  Preliminary Report (23 @ 09:12):    Normal Respiratory Loni present to date    Culture - Blood (collected 23 @ 18:41)  Source: .Blood Blood  Preliminary Report (23 @ 08:00):    No growth at 12 hours    Culture - Blood (collected 23 @ 18:41)  Source: .Blood Blood  Preliminary Report (23 @ 08:00):    No growth at 12 hours

## 2023-06-18 NOTE — PROGRESS NOTE ADULT - ASSESSMENT
53M h/o seizure d/o, aortic dissection s/p AVR, aortic graft revision 3/20/23, 2nd stage TEVAR 5/5/23, course c/b peripancreatic/RP hemorrhage/hematoma formation s/p multiple washouts--last ex-lap 6/1 (CT with near complete interval resolution of venu-pancreatic collection). s/p trach. Course c/b VAP 2/2 Enterobacter and Klebsiella. Fever, increased leukocytosis despite targeted therapy with ertapenem; CT with bibasilar consolidations ?aspiration pneumonia.  - f/u bcx 6/17  - f/u sputum cx 6/17--GS yeast, GPR  - reasonable to extend course of ertapenem 1g IV q24h pending above data  - reasonable to continue caspofungin 50mg IV q24h  - advise start vancomycin 1.25g IV q12h, trough before 4th dose

## 2023-06-18 NOTE — PROGRESS NOTE ADULT - ASSESSMENT
53yMale w/ PMHx of HTN, type A aortic dissection s/p Dacron grafts, AV resuspension in 2013, CAD s/p CABG x 1 SVG to RCA (5/2013 with Dr. Medina), seizure disorder, recent admission 3/20-4/14 for replacement of transverse aortic arch, second stage TEVAR and aorto-axillary bypass, AV replacement (bio 23mm), and CABG x 1 (SVG-RCA). Pt found to have endo leak and taken to OR for TEVAR revision on 5/5, Post op course c/b Klebsiella bacteremia (5/15), resp failure requiring intubation on 5/18, Mucus plugging s/p Bronch 5/19 and PEA arrest. Post operatively pt also found to have hemorrhagic pancreatitis with venu-pancreatic abscess possibly 2* to Depakote therefore s/p IR aspiration of peripancreatic fluid collection and paracentesis on 5/22, IR venu-pancreatic abscess drainage and placement of drainage catheter on 5/24. Pt then transferred from CTICU to SICU for further mgmt of hemorrhagic pancreatitis on 5/25. s/p IR placement of 2 new drainage catheters and catheter exchange on 5/26. LUQ drainage 5/30. OR 5/31 exlap washout & replacement of 3 new JPs and abthera vac with open abdomen. RTOR 6/1, no bleeding, evac of old blood, placement of feeding J-tube. failed extubation s/p trach 6/5.     -f/u ID about antibiotics in the setting of new fevers  -Trach collar as tolerated  -OOB daily  -Cont Tube feeds  -Flush both hematoma and retroperitoneal drains daily  -Ween oxycodone as tolerated  -Rest of care per SICU

## 2023-06-18 NOTE — PROGRESS NOTE ADULT - SUBJECTIVE AND OBJECTIVE BOX
SUBJECTIVE:  Patient seen and examined on AM rounds with chief resident. Overnight, patient spiked a fever. Workup was done (UA negative, CXR showed L effusion and possible RLL opacity, and blood cultures sent). This AM, patient is lying comfortably in bed. No acute distress.     MEDICATIONS  (STANDING):  acetaminophen   Oral Liquid .. 975 milliGRAM(s) Enteral Tube every 6 hours  albuterol/ipratropium for Nebulization 3 milliLiter(s) Nebulizer every 6 hours  artificial  tears Solution 1 Drop(s) Both EYES two times a day  aspirin  chewable 81 milliGRAM(s) Enteral Tube every 24 hours  caspofungin IVPB      chlorhexidine 0.12% Liquid 15 milliLiter(s) Oral Mucosa every 12 hours  chlorhexidine 2% Cloths 1 Application(s) Topical daily  dextrose 5%. 1000 milliLiter(s) (100 mL/Hr) IV Continuous <Continuous>  dextrose 50% Injectable 25 Gram(s) IV Push once  ertapenem  IVPB 1000 milliGRAM(s) IV Intermittent every 24 hours  glucagon  Injectable 1 milliGRAM(s) IntraMuscular once  heparin   Injectable 5000 Unit(s) SubCutaneous every 8 hours  insulin lispro (ADMELOG) corrective regimen sliding scale   SubCutaneous every 6 hours  lacosamide Solution 250 milliGRAM(s) Oral two times a day  loperamide Liquid 2 milliGRAM(s) Oral every 12 hours  metoprolol tartrate Injectable 2.5 milliGRAM(s) IV Push every 6 hours  multivitamin/minerals/iron Oral Solution (CENTRUM) 15 milliLiter(s) Oral daily  oxyCODONE    Solution 30 milliGRAM(s) Oral every 4 hours  pantoprazole  Injectable 40 milliGRAM(s) IV Push daily  sodium chloride 3%  Inhalation 4 milliLiter(s) Inhalation every 6 hours  vancomycin  IVPB 1250 milliGRAM(s) IV Intermittent every 12 hours    MEDICATIONS  (PRN):  dextrose Oral Gel 15 Gram(s) Oral once PRN Blood Glucose LESS THAN 70 milliGRAM(s)/deciliter  HYDROmorphone  Injectable 1 milliGRAM(s) IV Push every 4 hours PRN breakthrough pain  sodium chloride 0.9% lock flush 10 milliLiter(s) IV Push every 1 hour PRN Pre/post blood products, medications, blood draw, and to maintain line patency      Vital Signs Last 24 Hrs  T(C): 37.4 (2023 14:00), Max: 38.7 (2023 21:55)  T(F): 99.3 (2023 14:00), Max: 101.7 (2023 21:55)  HR: 107 (2023 17:00) (80 - 141)  BP: 118/64 (2023 17:00) (91/53 - 158/86)  BP(mean): 84 (2023 17:00) (67 - 112)  RR: 14 (2023 17:00) (8 - 26)  SpO2: 100% (2023 17:00) (98% - 100%)    Parameters below as of 2023 17:00  Patient On (Oxygen Delivery Method): tracheostomy collar    O2 Concentration (%): 40    Physical Exam:  General: Resting comfortably in bed, NAD  HEENT: ATNC, trach in place  Pulmonary: Nonlabored breathing, no respiratory distress, no accessory muscle use noted, saturating well on trach collar  Cardiovascular: NSR  Abdomen: Soft, nondistended, mild generalized tenderness to palpation. No rebound no guarding Numerous CODIE drains noted to be SS and green-brown  Extremities: WWP, SCDs in place, No significant edema appreciated    I&O's Summary    2023 07:01  -  2023 07:00  --------------------------------------------------------  IN: 2260 mL / OUT: 1400 mL / NET: 860 mL    2023 07:01  -  2023 17:50  --------------------------------------------------------  IN: 600 mL / OUT: 0 mL / NET: 600 mL        LABS:                        7.3    21.52 )-----------( 697      ( 2023 05:30 )             23.4     06-18    145  |  107  |  27<H>  ----------------------------<  105<H>  4.0   |  30  |  0.72    Ca    8.2<L>      2023 05:30  Phos  4.8     -  Mg     2.0         TPro  6.1  /  Alb  2.4<L>  /  TBili  0.6  /  DBili  0.3  /  AST  19  /  ALT  12  /  AlkPhos  118  -18      Urinalysis Basic - ( 2023 18:41 )    Color: Yellow / Appearance: Clear / S.015 / pH: x  Gluc: x / Ketone: NEGATIVE  / Bili: Negative / Urobili: 0.2 E.U./dL   Blood: x / Protein: Trace mg/dL / Nitrite: NEGATIVE   Leuk Esterase: NEGATIVE / RBC: < 5 /HPF / WBC < 5 /HPF   Sq Epi: x / Non Sq Epi: x / Bacteria: Present /HPF      CAPILLARY BLOOD GLUCOSE      POCT Blood Glucose.: 113 mg/dL (2023 12:03)  POCT Blood Glucose.: 110 mg/dL (2023 06:37)  POCT Blood Glucose.: 112 mg/dL (2023 23:16)    LIVER FUNCTIONS - ( 2023 05:30 )  Alb: 2.4 g/dL / Pro: 6.1 g/dL / ALK PHOS: 118 U/L / ALT: 12 U/L / AST: 19 U/L / GGT: x

## 2023-06-18 NOTE — PROGRESS NOTE ADULT - ASSESSMENT
53yMale w/ PMHx of HTN, type A aortic dissection s/p Dacron grafts, AV resuspension in 2013, CAD s/p CABG x 1 SVG to RCA (5/2013 with Dr. Medina), seizure disorder, recent admission 3/20-4/14 for replacement of transverse aortic arch, second stage TEVAR and aorto-axillary bypass, AV replacement (bio 23mm), and CABG x 1 (SVG-RCA). Pt found to have endo leak and taken to OR for TEVAR revision on 5/5, Post op course c/b Klebsiella bacteremia (5/15), resp failure requiring intubation on 5/18, Mucus plugging s/p Bronch 5/19 and PEA arrest. Post operatively pt also found to have hemorrhagic pancreatitis with venu-pancreatic abscess possibly 2* to Depakote therefore s/p IR aspiration of peripancreatic fluid collection and paracentesis on 5/22, IR venu-pancreatic abscess drainage and placement of drainage catheter on 5/24. Pt then transferred from CTICU to SICU for further mgmt of hemorrhagic pancreatitis on 5/25. s/p IR placement of 2 new drainage catheters and catheter exchange on 5/26. LUQ drainage 5/30. OR 5/31 exlap washout & replacement of 3 new JPs and abthera vac with open abdomen. RTOR 6/1, no bleeding, evac of old blood, placement of feeding J-tube. failed extubation s/p trach 6/5    NEURO: Pain control: switch Dilaudid 3q4h to [id number] w/ additional PRN dilaudid, d/c'd fent gtt, Precedex gtt, d/c'd ketamine (6/14). Hx seizures: epilepsy recs appreciated, Cont vimpat. Depakote weaned off due to concerns for drug-related hemorrhagic pancreatitis. Repeat EEG (ordered)  HEENT: Artificial tears, Torticollis - PT/OT following.   CV: Sepsis without shock, hold off on diruesis as SBP 100s today. MAP > 65. s/p PEA arrest on 5/24 night. TTE (6/14) LVEF 75%, LVH, biatrial enlargement and elevated PA pressure (elevated from previous), mild to mod MR/TR . C/w ASA 81mg. Metoprolol 2.5q6. Diuresis (Lasix 80BID, chlorthiazide on hold)  PULM: ARDS resolved - off NO, s/p multiple Mucus plug/ Lung collapse s/p bronch x3 (most recently on 5/26) most likely from outward obstruction: Cont rotating pt around the clock. Cont Duonebs, 3% saline. failed extubation 6/3: s/p Trach 6/5: Tolerating 40% Trach Collar (backup CPAP 8/PS 5).  GI/FEN: TF Vital 1.0 at 70cc via feeding J-tube with imodium 2mg BID, Protonix BID, Hemorrhagic pancreatitis: s/p multiple drain placements and paracentisis by IR team. F/U C diff given increasing WBC,fever, diarrhea.  : AKF on HD s/p CVVHD, now w/ renal recovery, off of aquaphoresis for fluid overload. condom catheter.  HEME: Acute blood loss anemia requiring multiple transfusions  ENDO: mISS  ID: Febrile overnight with WBC 24K, was given Ertapenem x1 and Caspfungin x1, pending ID recommendations for further management. Recent CT A/P without significant intra-abdominal pathology, favoring respiratory etiology given secretions and Sputum growing GPR and yeast on 7/17.  PRIOR ABX: Ertapenem (6/13-6/16), meropenem (6/9-6/13), vanco x 1 (6/9), caspofungin (6/9-6/12), previous Kleb bacteremia from 5/15 & 5/17, subsequent bld cx negative, PNA w/ bronch cx kleb, enterobacter (zosyn, erta resistant), E faecalis, strep angionosus from 5/19, pancreatic abscess cx pan-sensitive kleb 5/22. completed Ceftriaxone( 6/5- 6/15) Chloe (5/21-6/5),  Caspo (5/23-5/30), Ampicillin (5/21- 5/27).   PPX: SCDs, SQH  LINES: PIVs // L rad kalpana (5/31-6/15), 5Fr PIV in LUE (6/13-15), Lsubclav HD Cath (5/31-6/12), RIJ TLC (5/22-5/27), RIJ HD cath (5/22-31), R Ax kalpana(5/12-5/31), LIJ TLC (5/23-6/1), RIJ TLC (6/1-13)   WOUNDS/DRAINS: Right OR CODIE, 2 left OR CODIE, left IR hematoma drain (bilious), left IR pancreatic drain  PT: PT/OT ordered 6/5. Out of bed to stretcher chair  Dispo: SICU

## 2023-06-18 NOTE — PROGRESS NOTE ADULT - SUBJECTIVE AND OBJECTIVE BOX
1. 16 Fr teal LUQ peripancreatic abscess "Left Pancreatic CODIE Drain" placed on 5/24 (attached to gravity bag):   -0 cc serosanguinous output in 24 hrs. 0 cc serosanguinous output in 24 hrs.    2. 16 Fr teal LUQ hematoma "Left Abdominal Hematoma CODIE Drain" placed on 5/25 and upsized on 5/26:   -20 cc serosanguinous output in 24 hrs. 20 cc serosanguinous output in 24 hrs.

## 2023-06-19 LAB
ANION GAP SERPL CALC-SCNC: 10 MMOL/L — SIGNIFICANT CHANGE UP (ref 5–17)
APTT BLD: 36.4 SEC — HIGH (ref 27.5–35.5)
BUN SERPL-MCNC: 28 MG/DL — HIGH (ref 7–23)
C DIFF BY PCR RESULT: NEGATIVE — SIGNIFICANT CHANGE UP
C DIFF GDH STL QL: SIGNIFICANT CHANGE UP
C DIFF GDH STL QL: SIGNIFICANT CHANGE UP
CALCIUM SERPL-MCNC: 7.9 MG/DL — LOW (ref 8.4–10.5)
CHLORIDE SERPL-SCNC: 105 MMOL/L — SIGNIFICANT CHANGE UP (ref 96–108)
CO2 SERPL-SCNC: 29 MMOL/L — SIGNIFICANT CHANGE UP (ref 22–31)
CREAT SERPL-MCNC: 0.75 MG/DL — SIGNIFICANT CHANGE UP (ref 0.5–1.3)
CULTURE RESULTS: SIGNIFICANT CHANGE UP
EGFR: 107 ML/MIN/1.73M2 — SIGNIFICANT CHANGE UP
GLUCOSE BLDC GLUCOMTR-MCNC: 110 MG/DL — HIGH (ref 70–99)
GLUCOSE BLDC GLUCOMTR-MCNC: 153 MG/DL — HIGH (ref 70–99)
GLUCOSE BLDC GLUCOMTR-MCNC: 95 MG/DL — SIGNIFICANT CHANGE UP (ref 70–99)
GLUCOSE SERPL-MCNC: 108 MG/DL — HIGH (ref 70–99)
HCT VFR BLD CALC: 20.4 % — CRITICAL LOW (ref 39–50)
HGB BLD-MCNC: 6.3 G/DL — CRITICAL LOW (ref 13–17)
INR BLD: 1.28 — HIGH (ref 0.88–1.16)
MAGNESIUM SERPL-MCNC: 2 MG/DL — SIGNIFICANT CHANGE UP (ref 1.6–2.6)
MCHC RBC-ENTMCNC: 29.4 PG — SIGNIFICANT CHANGE UP (ref 27–34)
MCHC RBC-ENTMCNC: 30.9 GM/DL — LOW (ref 32–36)
MCV RBC AUTO: 95.3 FL — SIGNIFICANT CHANGE UP (ref 80–100)
NRBC # BLD: 0 /100 WBCS — SIGNIFICANT CHANGE UP (ref 0–0)
PHOSPHATE SERPL-MCNC: 4.6 MG/DL — HIGH (ref 2.5–4.5)
PLATELET # BLD AUTO: 699 K/UL — HIGH (ref 150–400)
POTASSIUM SERPL-MCNC: 3.4 MMOL/L — LOW (ref 3.5–5.3)
POTASSIUM SERPL-SCNC: 3.4 MMOL/L — LOW (ref 3.5–5.3)
PROTHROM AB SERPL-ACNC: 15.3 SEC — HIGH (ref 10.5–13.4)
RBC # BLD: 2.14 M/UL — LOW (ref 4.2–5.8)
RBC # FLD: 17.1 % — HIGH (ref 10.3–14.5)
SODIUM SERPL-SCNC: 144 MMOL/L — SIGNIFICANT CHANGE UP (ref 135–145)
SPECIMEN SOURCE: SIGNIFICANT CHANGE UP
VANCOMYCIN FLD-MCNC: 11.6 UG/ML — SIGNIFICANT CHANGE UP
WBC # BLD: 19.81 K/UL — HIGH (ref 3.8–10.5)
WBC # FLD AUTO: 19.81 K/UL — HIGH (ref 3.8–10.5)

## 2023-06-19 PROCEDURE — 99233 SBSQ HOSP IP/OBS HIGH 50: CPT

## 2023-06-19 PROCEDURE — 99233 SBSQ HOSP IP/OBS HIGH 50: CPT | Mod: GC

## 2023-06-19 RX ORDER — VANCOMYCIN HCL 1 G
1500 VIAL (EA) INTRAVENOUS EVERY 12 HOURS
Refills: 0 | Status: COMPLETED | OUTPATIENT
Start: 2023-06-19 | End: 2023-06-20

## 2023-06-19 RX ORDER — LOPERAMIDE HCL 2 MG
2 TABLET ORAL DAILY
Refills: 0 | Status: DISCONTINUED | OUTPATIENT
Start: 2023-06-19 | End: 2023-06-19

## 2023-06-19 RX ORDER — FUROSEMIDE 40 MG
80 TABLET ORAL ONCE
Refills: 0 | Status: DISCONTINUED | OUTPATIENT
Start: 2023-06-19 | End: 2023-06-19

## 2023-06-19 RX ORDER — HYDROMORPHONE HYDROCHLORIDE 2 MG/ML
2 INJECTION INTRAMUSCULAR; INTRAVENOUS; SUBCUTANEOUS EVERY 4 HOURS
Refills: 0 | Status: DISCONTINUED | OUTPATIENT
Start: 2023-06-19 | End: 2023-06-21

## 2023-06-19 RX ORDER — LOPERAMIDE HCL 2 MG
2 TABLET ORAL EVERY 24 HOURS
Refills: 0 | Status: DISCONTINUED | OUTPATIENT
Start: 2023-06-19 | End: 2023-06-20

## 2023-06-19 RX ORDER — POTASSIUM CHLORIDE 20 MEQ
40 PACKET (EA) ORAL
Refills: 0 | Status: COMPLETED | OUTPATIENT
Start: 2023-06-19 | End: 2023-06-19

## 2023-06-19 RX ORDER — FUROSEMIDE 40 MG
80 TABLET ORAL ONCE
Refills: 0 | Status: COMPLETED | OUTPATIENT
Start: 2023-06-19 | End: 2023-06-19

## 2023-06-19 RX ORDER — CHLOROTHIAZIDE 500 MG
500 TABLET ORAL ONCE
Refills: 0 | Status: COMPLETED | OUTPATIENT
Start: 2023-06-19 | End: 2023-06-19

## 2023-06-19 RX ADMIN — Medication 975 MILLIGRAM(S): at 23:39

## 2023-06-19 RX ADMIN — Medication 975 MILLIGRAM(S): at 19:08

## 2023-06-19 RX ADMIN — OXYCODONE HYDROCHLORIDE 30 MILLIGRAM(S): 5 TABLET ORAL at 03:54

## 2023-06-19 RX ADMIN — OXYCODONE HYDROCHLORIDE 30 MILLIGRAM(S): 5 TABLET ORAL at 07:00

## 2023-06-19 RX ADMIN — SODIUM CHLORIDE 4 MILLILITER(S): 9 INJECTION INTRAMUSCULAR; INTRAVENOUS; SUBCUTANEOUS at 12:44

## 2023-06-19 RX ADMIN — Medication 300 MILLIGRAM(S): at 18:11

## 2023-06-19 RX ADMIN — HYDROMORPHONE HYDROCHLORIDE 2 MILLIGRAM(S): 2 INJECTION INTRAMUSCULAR; INTRAVENOUS; SUBCUTANEOUS at 14:51

## 2023-06-19 RX ADMIN — Medication 2: at 19:12

## 2023-06-19 RX ADMIN — CHLORHEXIDINE GLUCONATE 15 MILLILITER(S): 213 SOLUTION TOPICAL at 06:26

## 2023-06-19 RX ADMIN — Medication 2.5 MILLIGRAM(S): at 18:11

## 2023-06-19 RX ADMIN — Medication 2 MILLIGRAM(S): at 22:19

## 2023-06-19 RX ADMIN — Medication 1 DROP(S): at 07:17

## 2023-06-19 RX ADMIN — ERTAPENEM SODIUM 120 MILLIGRAM(S): 1 INJECTION, POWDER, LYOPHILIZED, FOR SOLUTION INTRAMUSCULAR; INTRAVENOUS at 23:26

## 2023-06-19 RX ADMIN — HYDROMORPHONE HYDROCHLORIDE 2 MILLIGRAM(S): 2 INJECTION INTRAMUSCULAR; INTRAVENOUS; SUBCUTANEOUS at 22:11

## 2023-06-19 RX ADMIN — Medication 100 MILLIGRAM(S): at 13:04

## 2023-06-19 RX ADMIN — Medication 300 MILLIGRAM(S): at 08:32

## 2023-06-19 RX ADMIN — Medication 975 MILLIGRAM(S): at 06:26

## 2023-06-19 RX ADMIN — Medication 40 MILLIEQUIVALENT(S): at 09:59

## 2023-06-19 RX ADMIN — HYDROMORPHONE HYDROCHLORIDE 2 MILLIGRAM(S): 2 INJECTION INTRAMUSCULAR; INTRAVENOUS; SUBCUTANEOUS at 18:30

## 2023-06-19 RX ADMIN — CASPOFUNGIN ACETATE 260 MILLIGRAM(S): 7 INJECTION, POWDER, LYOPHILIZED, FOR SOLUTION INTRAVENOUS at 23:28

## 2023-06-19 RX ADMIN — Medication 2 MILLIGRAM(S): at 11:32

## 2023-06-19 RX ADMIN — Medication 1 DROP(S): at 18:31

## 2023-06-19 RX ADMIN — Medication 975 MILLIGRAM(S): at 11:45

## 2023-06-19 RX ADMIN — HYDROMORPHONE HYDROCHLORIDE 2 MILLIGRAM(S): 2 INJECTION INTRAMUSCULAR; INTRAVENOUS; SUBCUTANEOUS at 23:25

## 2023-06-19 RX ADMIN — LACOSAMIDE 250 MILLIGRAM(S): 50 TABLET ORAL at 06:27

## 2023-06-19 RX ADMIN — Medication 80 MILLIGRAM(S): at 14:51

## 2023-06-19 RX ADMIN — Medication 975 MILLIGRAM(S): at 23:55

## 2023-06-19 RX ADMIN — Medication 2.5 MILLIGRAM(S): at 06:27

## 2023-06-19 RX ADMIN — OXYCODONE HYDROCHLORIDE 30 MILLIGRAM(S): 5 TABLET ORAL at 06:26

## 2023-06-19 RX ADMIN — Medication 975 MILLIGRAM(S): at 18:31

## 2023-06-19 RX ADMIN — HYDROMORPHONE HYDROCHLORIDE 2 MILLIGRAM(S): 2 INJECTION INTRAMUSCULAR; INTRAVENOUS; SUBCUTANEOUS at 18:12

## 2023-06-19 RX ADMIN — Medication 3 MILLILITER(S): at 05:39

## 2023-06-19 RX ADMIN — CHLORHEXIDINE GLUCONATE 1 APPLICATION(S): 213 SOLUTION TOPICAL at 06:16

## 2023-06-19 RX ADMIN — Medication 975 MILLIGRAM(S): at 00:15

## 2023-06-19 RX ADMIN — Medication 2.5 MILLIGRAM(S): at 23:26

## 2023-06-19 RX ADMIN — HEPARIN SODIUM 5000 UNIT(S): 5000 INJECTION INTRAVENOUS; SUBCUTANEOUS at 14:51

## 2023-06-19 RX ADMIN — SODIUM CHLORIDE 4 MILLILITER(S): 9 INJECTION INTRAMUSCULAR; INTRAVENOUS; SUBCUTANEOUS at 05:39

## 2023-06-19 RX ADMIN — Medication 15 MILLILITER(S): at 11:32

## 2023-06-19 RX ADMIN — Medication 3 MILLILITER(S): at 12:41

## 2023-06-19 RX ADMIN — PANTOPRAZOLE SODIUM 40 MILLIGRAM(S): 20 TABLET, DELAYED RELEASE ORAL at 11:32

## 2023-06-19 RX ADMIN — Medication 2.5 MILLIGRAM(S): at 11:32

## 2023-06-19 RX ADMIN — Medication 2.5 MILLIGRAM(S): at 00:02

## 2023-06-19 RX ADMIN — Medication 3 MILLILITER(S): at 18:47

## 2023-06-19 RX ADMIN — LACOSAMIDE 250 MILLIGRAM(S): 50 TABLET ORAL at 18:58

## 2023-06-19 RX ADMIN — Medication 81 MILLIGRAM(S): at 13:04

## 2023-06-19 RX ADMIN — OXYCODONE HYDROCHLORIDE 30 MILLIGRAM(S): 5 TABLET ORAL at 02:29

## 2023-06-19 RX ADMIN — Medication 975 MILLIGRAM(S): at 07:00

## 2023-06-19 RX ADMIN — HEPARIN SODIUM 5000 UNIT(S): 5000 INJECTION INTRAVENOUS; SUBCUTANEOUS at 22:11

## 2023-06-19 RX ADMIN — Medication 975 MILLIGRAM(S): at 11:31

## 2023-06-19 RX ADMIN — Medication 40 MILLIEQUIVALENT(S): at 07:37

## 2023-06-19 RX ADMIN — HYDROMORPHONE HYDROCHLORIDE 2 MILLIGRAM(S): 2 INJECTION INTRAMUSCULAR; INTRAVENOUS; SUBCUTANEOUS at 15:00

## 2023-06-19 RX ADMIN — HEPARIN SODIUM 5000 UNIT(S): 5000 INJECTION INTRAVENOUS; SUBCUTANEOUS at 06:28

## 2023-06-19 RX ADMIN — CHLORHEXIDINE GLUCONATE 15 MILLILITER(S): 213 SOLUTION TOPICAL at 18:31

## 2023-06-19 NOTE — PROGRESS NOTE ADULT - SUBJECTIVE AND OBJECTIVE BOX
SUBJECTIVE: Patient seen and examined at bedside.  There were no acute events overnight; however, diarrhea has worsened resulting in placement of a rectal tube, increase of Imodium to BID and C. difficile cultures sent.  Patient appears comfortable and although become mildly irritated with physical examination.    aspirin  chewable 81 milliGRAM(s) Enteral Tube every 24 hours  caspofungin IVPB      caspofungin IVPB 50 milliGRAM(s) IV Intermittent every 24 hours  ertapenem  IVPB 1000 milliGRAM(s) IV Intermittent every 24 hours  heparin   Injectable 5000 Unit(s) SubCutaneous every 8 hours  metoprolol tartrate Injectable 2.5 milliGRAM(s) IV Push every 6 hours  vancomycin  IVPB 1500 milliGRAM(s) IV Intermittent every 12 hours    MEDICATIONS  (PRN):  dextrose Oral Gel 15 Gram(s) Oral once PRN Blood Glucose LESS THAN 70 milliGRAM(s)/deciliter  HYDROmorphone  Injectable 1 milliGRAM(s) IV Push every 4 hours PRN breakthrough pain  sodium chloride 0.9% lock flush 10 milliLiter(s) IV Push every 1 hour PRN Pre/post blood products, medications, blood draw, and to maintain line patency      I&O's Detail    2023 07:  -  2023 07:00  --------------------------------------------------------  IN:    Enteral Tube Flush: 350 mL    IV PiggyBack: 250 mL    IV PiggyBack: 50 mL    IV PiggyBack: 250 mL    Vital1.0: 1400 mL  Total IN: 2300 mL    OUT:    Bulb (mL): 5 mL    Bulb (mL): 20 mL    Bulb (mL): 50 mL    Drain (mL): 0 mL    Drain (mL): 0 mL    Voided (mL): 1300 mL  Total OUT: 1375 mL    Total NET: 925 mL      2023 07:01  -  2023 08:48  --------------------------------------------------------  IN:    Enteral Tube Flush: 40 mL    IV PiggyBack: 300 mL    Vital1.0: 140 mL  Total IN: 480 mL    OUT:  Total OUT: 0 mL    Total NET: 480 mL          T(C): 37.8 (23 @ 06:12), Max: 37.8 (23 @ 06:12)  HR: 105 (23 @ 08:00) (101 - 124)  BP: 116/64 (23 @ 08:00) (91/53 - 124/63)  RR: 9 (23 @ 08:00) (8 - 26)  SpO2: 100% (23 @ 08:00) (96% - 100%)    GENERAL: NAD, Resting comfortably in bed, awake, opens eyes spontaneously  HEENT: NCAT, MMM, Normal conjunctiva, tracheostomy is clean/dry/intact, improving torticollis (favoring L side)  RESP: Nonlabored breathing on trach collar, No respiratory distress, Intermittent cough  CARD: Normal rate, Normal peripheral perfusion  GI: Soft, ND, NT, No guarding, No rebound tenderness, stapled midline incision well-healed, CODIE drains (1-3) minimal serous output and 2/3 displaced and resutures ~10cm out, IR drain 1 (hematoma) purulent/bilious output, and IR drain to bag minimal serosanguinous  EXTREM: WWP, No edema, No gross deformity of extremities  SKIN: No rashes, no lesions  NEURO: Awake and alert, No focal motor or sensory deficits  PSYCH: Affect and characteristics of appearance, verbalizations, and behaviors are appropriate    LABS:                        6.3    19.81 )-----------( 699      ( 2023 05:37 )             20.4         144  |  105  |  28<H>  ----------------------------<  108<H>  3.4<L>   |  29  |  0.75    Ca    7.9<L>      2023 05:37  Phos  4.6       Mg     2.0         TPro  6.1  /  Alb  2.4<L>  /  TBili  0.6  /  DBili  0.3  /  AST  19  /  ALT  12  /  AlkPhos  118      PT/INR - ( 2023 05:37 )   PT: 15.3 sec;   INR: 1.28          PTT - ( 2023 05:37 )  PTT:36.4 sec  Urinalysis Basic - ( 2023 18:41 )    Color: Yellow / Appearance: Clear / S.015 / pH: x  Gluc: x / Ketone: NEGATIVE  / Bili: Negative / Urobili: 0.2 E.U./dL   Blood: x / Protein: Trace mg/dL / Nitrite: NEGATIVE   Leuk Esterase: NEGATIVE / RBC: < 5 /HPF / WBC < 5 /HPF   Sq Epi: x / Non Sq Epi: x / Bacteria: Present /HPF        RADIOLOGY & ADDITIONAL STUDIES:  CT Abdomen and Pelvis w/ Oral Cont and w/ IV Cont:   ACC: [id number] EXAM:  CT ABDOMEN AND PELVIS OC IC   ORDERED BY: DEB OROPEZA     PROCEDURE DATE:  2023          INTERPRETATION:  INTERPRETATION:  CLINICAL INFORMATION: Evaluate drain   placement, abdominal collections    COMPARISON: 2023    CONTRAST/COMPLICATIONS:  IV Contrast: Isovue 370  0 cc administered   0 cc discarded  Oral Contrast: NONE  Complications: None reported at time of study completion    PROCEDURE:  CT of the Chest, Abdomen and Pelvis was performed.  Sagittal and coronal reformats were performed.    FINDINGS:  CHEST:  LUNGS AND LARGE AIRWAYS: Persistent bilateral lower lobe   consolidative/focal atelectatic changes, not significantly changed.  PLEURA: Small bilateral pleural effusions, grossly stable. Partially   loculated superiorly bilaterally  VESSELS: Stable position of thoracic aortic stent. Known thoracic aortic   aneurysm and dissection.. Post aortic valve replacement. Of note, the   dissection extends into the brachiocephalic artery, and left common   carotid artery proximally.  HEART: Normal heart size No pericardial effusion.  MEDIASTINUM AND MARJAN: No adenopathy  CHEST WALL AND LOWER NECK: Able tracheostomy tube    ABDOMEN AND PELVIS:  LIVER: Within normal limits.  BILE DUCTS: Normal caliber.  GALLBLADDER: Within normal limits.  SPLEEN: Stable splenomegaly fluid collection with areas of apparent   splenic infarction.  PANCREAS: Small, indeterminate 2.2 cm pancreatic head cystic focus. No   pancreatic ductal dilatation. Edematous changesnoted in pancreatic tail.  ADRENALS: Within normal limits.  KIDNEYS/URETERS: Within normal limits.    BLADDER: Within normal limits.  REPRODUCTIVE ORGANS: Uterus is not visualized. No adnexal masses.    BOWEL: No bowel obstruction. The colon is fluidfilled. No wall edema.   Stable position of jejunal tube.  PERITONEUM: Decreased pelvic fluid, nearly resolved. No loculated   collections to suggest an abscess. Stable position of percutaneous pelvic   drainage catheter.  VESSELS: Known aortic dissection  RETROPERITONEUM/LYMPH NODES: No lymphadenopathy.  ABDOMINAL WALL: No hernia. Anasarca  BONES: No suspicious lesions    IMPRESSION:  1.  Near complete resolution of pelvic free fluid. No loculated   intra-abdominal or pelvic collections suggest an abscess.  2.   Persistent bilateral lower lobe consolidative/focal atelectatic   changes with small bilateral pleural effusions.  3.  Known thoracoabdominal aortic dissection and thoracic aortic   endograft. Stable      --- End of Report ---      DORIS URIAS MD; Attending Radiologist  This document has been electronically signed. 2023  1:26PM (23 @ 10:41)      Culture - Sputum (collected 23 @ 21:51)  Source: Trach Asp Tracheal Aspirate  Gram Stain (23 @ 22:37):    Rare epithelial cells    Few WBC's    Few Yeast    Rare Gram Positive Rods  Preliminary Report (23 @ 09:12):    Normal Respiratory Loni present to date    Culture - Blood (collected 23 @ 18:41)  Source: .Blood Blood  Preliminary Report (23 @ 20:01):    No growth at 1 day.    Culture - Blood (collected 23 @ 18:41)  Source: .Blood Blood  Preliminary Report (23 @ 20:01):    No growth at 1 day.

## 2023-06-19 NOTE — PROGRESS NOTE ADULT - ASSESSMENT
53yMale w/ PMHx of HTN, type A aortic dissection s/p Dacron grafts, AV resuspension in 2013, CAD s/p CABG x 1 SVG to RCA (5/2013 with Dr. Medina), seizure disorder, recent admission 3/20-4/14 for replacement of transverse aortic arch, second stage TEVAR and aorto-axillary bypass, AV replacement (bio 23mm), and CABG x 1 (SVG-RCA). Pt found to have endo leak and taken to OR for TEVAR revision on 5/5, Post op course c/b Klebsiella bacteremia (5/15), resp failure requiring intubation on 5/18, Mucus plugging s/p Bronch 5/19 and PEA arrest. Post operatively pt also found to have hemorrhagic pancreatitis with venu-pancreatic abscess possibly 2* to Depakote therefore s/p IR aspiration of peripancreatic fluid collection and paracentesis on 5/22, IR venu-pancreatic abscess drainage and placement of drainage catheter on 5/24. Pt then transferred from CTICU to SICU for further mgmt of hemorrhagic pancreatitis on 5/25. s/p IR placement of 2 new drainage catheters and catheter exchange on 5/26. LUQ drainage 5/30. OR 5/31 exlap washout & replacement of 3 new JPs and abthera vac with open abdomen. RTOR 6/1, no bleeding, evac of old blood, placement of feeding J-tube. failed extubation s/p trach 6/5 with pneumonia.    NEURO: Pain control: switch Dilaudid 2 q4, w/ additional PRN dilaudid, d/c'd fent gtt, Precedex gtt, d/c'd ketamine (6/14). Hx seizures: epilepsy recs appreciated, Cont vimpat. Depakote weaned off due to concerns for drug-related hemorrhagic pancreatitis. Repeat EEG (ordered)  HEENT: Artificial tears, Torticollis - PT/OT following.   CV: Sepsis without shock, hold off on diruesis as SBP 100s. MAP > 65. s/p PEA arrest on 5/24 night. TTE (6/14) LVEF 75%, LVH, biatrial enlargement and elevated PA pressure (elevated from previous), mild to mod MR/TR . C/w ASA 81mg. Metoprolol 2.5q6. Diuresis (Lasix 80BID, chlorthiazide)  PULM: ARDS resolved - off NO, s/p multiple Mucus plug/ Lung collapse s/p bronch x3 (most recently on 5/26) most likely from outward obstruction: Cont rotating pt around the clock. Cont Duonebs, 3% saline. failed extubation 6/3: s/p Trach 6/5: Tolerating 40% Trach Collar (backup CPAP 8/PS 5).  GI/FEN: TF Vital 1.0 at 70cc via feeding J-tube with imodium 2mg BID (no BMs), Protonix BID, Hemorrhagic pancreatitis: s/p multiple drain placements and paracentisis by IR team. Cdiff negative (6/19)  : AKF on HD s/p CVVHD, now w/ renal recovery, off of aquaphoresis for fluid overload. condom catheter.  HEME: Acute blood loss anemia requiring multiple transfusions  ENDO: mISS  ID: AFebrile overnight was restarted on Ertapenem and Caspofungin. Recent CT A/P without significant intra-abdominal pathology, favoring respiratory etiology given secretions and Sputum growing GPR and yeast on 7/17. Resume Caspo, Erta, vanc (6/18--)  PRIOR ABX: Ertapenem (6/13-6/16), meropenem (6/9-6/13), vanco x 1 (6/9), caspofungin (6/9-6/12), previous Kleb bacteremia from 5/15 & 5/17, subsequent bld cx negative, PNA w/ bronch cx kleb, enterobacter (zosyn, erta resistant), E faecalis, strep angionosus from 5/19, pancreatic abscess cx pan-sensitive kleb 5/22. completed Ceftriaxone( 6/5- 6/15) Chloe (5/21-6/5),  Caspo (5/23-5/30), Ampicillin (5/21- 5/27).   PPX: SCDs, SQH  LINES: PIVs // L rad kalpana (5/31-6/15), 5Fr PIV in LUE (6/13-15), Lsubclav HD Cath (5/31-6/12), RIJ TLC (5/22-5/27), RIJ HD cath (5/22-31), R Ax kalpana(5/12-5/31), LIJ TLC (5/23-6/1), RIJ TLC (6/1-13)   WOUNDS/DRAINS: Right OR CODIE, 2 left OR CODIE, left IR hematoma drain (bilious), left IR pancreatic drain  PT: PT/OT ordered 6/5. Out of bed to stretcher chair  Dispo: SICU

## 2023-06-19 NOTE — PROGRESS NOTE ADULT - ASSESSMENT
53M h/o seizure d/o, aortic dissection s/p AVR, aortic graft revision 3/20/23, 2nd stage TEVAR 5/5/23, course c/b peripancreatic/RP hemorrhage/hematoma formation s/p multiple washouts--last ex-lap 6/1 (CT with near complete interval resolution of venu-pancreatic collection). s/p trach. Course c/b VAP 2/2 Enterobacter and Klebsiella. Fever, increased leukocytosis despite targeted therapy with ertapenem; CT with bibasilar consolidations ?aspiration pneumonia.  - f/u bcx 6/17--ngtd  - f/u sputum cx 6/17--GS yeast, GPR--cx normal shraddha to date  - reasonable to extend course of ertapenem 1g IV q24h  - reasonable to continue caspofungin 50mg IV q24h  - reasonable to continue vancomycin 1.25g IV q12h, trough before 4th dose

## 2023-06-19 NOTE — PROGRESS NOTE ADULT - ASSESSMENT
53yMale w/ PMHx of HTN, type A aortic dissection s/p Dacron grafts, AV resuspension in 2013, CAD s/p CABG x 1 SVG to RCA (5/2013 with Dr. Medina), seizure disorder, recent admission 3/20-4/14 for replacement of transverse aortic arch, second stage TEVAR and aorto-axillary bypass, AV replacement (bio 23mm), and CABG x 1 (SVG-RCA). Pt found to have endo leak and taken to OR for TEVAR revision on 5/5, Post op course c/b Klebsiella bacteremia (5/15), resp failure requiring intubation on 5/18, Mucus plugging s/p Bronch 5/19 and PEA arrest. Post operatively pt also found to have hemorrhagic pancreatitis with venu-pancreatic abscess possibly 2* to Depakote therefore s/p IR aspiration of peripancreatic fluid collection and paracentesis on 5/22, IR venu-pancreatic abscess drainage and placement of drainage catheter on 5/24. Pt then transferred from CTICU to SICU for further mgmt of hemorrhagic pancreatitis on 5/25. s/p IR placement of 2 new drainage catheters and catheter exchange on 5/26. LUQ drainage 5/30. OR 5/31 exlap washout & replacement of 3 new JPs and abthera vac with open abdomen. RTOR 6/1, no bleeding, evac of old blood, placement of feeding J-tube. failed extubation s/p trach 6/5.     PLAN  - Follow-up CBC (6.8 Hgb) post transfusion  - Follow-up stool cultures  - Follow-up sputum culture speciation and await ID recommendations  - Maintain active Type and Screen  - Continue tube feeds  - Monitor drain outputs   - Remainder of care per ICU  - Please page Surgery Team 1 at [phone] with any questions and/or clinical changes     8

## 2023-06-19 NOTE — PROGRESS NOTE ADULT - ASSESSMENT
53yMale w/ PMHx of HTN, type A aortic dissection s/p Dacron grafts, AV resuspension in 2013, CAD s/p CABG x 1 SVG to RCA (5/2013 with Dr. Medina), seizure disorder, recent admission 3/20-4/14 for replacement of transverse aortic arch, second stage TEVAR and aorto-axillary bypass, AV replacement (bio 23mm), and CABG x 1 (SVG-RCA). Pt found to have endo leak and taken to OR for TEVAR revision on 5/5, Post op course c/b Klebsiella bacteremia (5/15), resp failure requiring intubation on 5/18, Mucus plugging s/p Bronch 5/19 and PEA arrest. Post operatively pt also found to have hemorrhagic pancreatitis with venu-pancreatic abscess possibly 2* to Depakote therefore s/p IR aspiration of peripancreatic fluid collection and paracentesis on 5/22, IR venu-pancreatic abscess drainage and placement of drainage catheter on 5/24. Pt then transferred from CTICU to SICU for further mgmt of hemorrhagic pancreatitis on 5/25. s/p IR placement of 2 new drainage catheters and catheter exchange on 5/26. LUQ drainage 5/30. OR 5/31 exlap washout & replacement of 3 new JPs and abthera vac with open abdomen. RTOR 6/1, no bleeding, evac of old blood, placement of feeding J-tube. failed extubation s/p trach 6/5.     Plan:    -Repeat cbc  -Transfuse as needed  -Active t&s  -F/u cx  -F/u ID recs  -Continue TFs  -Rest of care per SICU  -Team 1 following

## 2023-06-19 NOTE — PROGRESS NOTE ADULT - SUBJECTIVE AND OBJECTIVE BOX
SUBJECTIVE: Patient seen and evaluated.        MEDICATIONS  (STANDING):  acetaminophen   Oral Liquid .. 975 milliGRAM(s) Enteral Tube every 6 hours  albuterol/ipratropium for Nebulization 3 milliLiter(s) Nebulizer every 6 hours  artificial  tears Solution 1 Drop(s) Both EYES two times a day  aspirin  chewable 81 milliGRAM(s) Enteral Tube every 24 hours  caspofungin IVPB      caspofungin IVPB 50 milliGRAM(s) IV Intermittent every 24 hours  chlorhexidine 0.12% Liquid 15 milliLiter(s) Oral Mucosa every 12 hours  chlorhexidine 2% Cloths 1 Application(s) Topical daily  dextrose 5%. 1000 milliLiter(s) (100 mL/Hr) IV Continuous <Continuous>  dextrose 50% Injectable 25 Gram(s) IV Push once  ertapenem  IVPB 1000 milliGRAM(s) IV Intermittent every 24 hours  glucagon  Injectable 1 milliGRAM(s) IntraMuscular once  heparin   Injectable 5000 Unit(s) SubCutaneous every 8 hours  HYDROmorphone  Injectable 2 milliGRAM(s) IV Push every 4 hours  insulin lispro (ADMELOG) corrective regimen sliding scale   SubCutaneous every 6 hours  lacosamide Solution 250 milliGRAM(s) Oral two times a day  loperamide Liquid 2 milliGRAM(s) Oral daily  metoprolol tartrate Injectable 2.5 milliGRAM(s) IV Push every 6 hours  multivitamin/minerals/iron Oral Solution (CENTRUM) 15 milliLiter(s) Oral daily  pantoprazole  Injectable 40 milliGRAM(s) IV Push daily  sodium chloride 3%  Inhalation 4 milliLiter(s) Inhalation every 6 hours  vancomycin  IVPB 1500 milliGRAM(s) IV Intermittent every 12 hours    MEDICATIONS  (PRN):  dextrose Oral Gel 15 Gram(s) Oral once PRN Blood Glucose LESS THAN 70 milliGRAM(s)/deciliter  HYDROmorphone  Injectable 1 milliGRAM(s) IV Push every 4 hours PRN breakthrough pain  sodium chloride 0.9% lock flush 10 milliLiter(s) IV Push every 1 hour PRN Pre/post blood products, medications, blood draw, and to maintain line patency      Vital Signs Last 24 Hrs  T(C): 37.5 (2023 14:39), Max: 37.8 (2023 06:12)  T(F): 99.5 (2023 14:39), Max: 100 (2023 06:12)  HR: 104 (2023 15:00) (98 - 113)  BP: 151/79 (2023 15:00) (96/54 - 151/79)  BP(mean): 104 (2023 15:00) (70 - 104)  RR: 18 (2023 15:00) (8 - 60)  SpO2: 100% (2023 15:00) (96% - 110%)    Parameters below as of 2023 16:00  Patient On (Oxygen Delivery Method): tracheostomy collar    O2 Concentration (%): 40    Physical Exam:  GENERAL: NAD, resting comfortably in bed, awakens to voice and stimuli, hands flicker to command  HEENT: NCAT, MMM, s/p trach w copious thick secretions, cuff deflated  C/V: Normal rate, normal peripheral perfusion, systolic murmur  PULM: Nonlabored breathing, no respiratory distress, decreased sound bibasilar  ABD: Soft, ND, TTP but not peritonitic, drains SS green/brown tinge  EXTREM: WWP, dependent and extremities w 2+ edema, SCDs in place  NEURO: No focal deficits    I&O's Summary    2023 07:01  -  2023 07:00  --------------------------------------------------------  IN: 2300 mL / OUT: 1375 mL / NET: 925 mL    2023 07:01  -  2023 15:28  --------------------------------------------------------  IN: 1450 mL / OUT: 200 mL / NET: 1250 mL        LABS:                        6.3    19.81 )-----------( 699      ( 2023 05:37 )             20.4     06-19    144  |  105  |  28<H>  ----------------------------<  108<H>  3.4<L>   |  29  |  0.75    Ca    7.9<L>      2023 05:37  Phos  4.6     06-  Mg     2.0     06-19    TPro  6.1  /  Alb  2.4<L>  /  TBili  0.6  /  DBili  0.3  /  AST  19  /  ALT  12  /  AlkPhos  118  06-18    PT/INR - ( 2023 05:37 )   PT: 15.3 sec;   INR: 1.28          PTT - ( 2023 05:37 )  PTT:36.4 sec  Urinalysis Basic - ( 2023 18:41 )    Color: Yellow / Appearance: Clear / S.015 / pH: x  Gluc: x / Ketone: NEGATIVE  / Bili: Negative / Urobili: 0.2 E.U./dL   Blood: x / Protein: Trace mg/dL / Nitrite: NEGATIVE   Leuk Esterase: NEGATIVE / RBC: < 5 /HPF / WBC < 5 /HPF   Sq Epi: x / Non Sq Epi: x / Bacteria: Present /HPF      CAPILLARY BLOOD GLUCOSE      POCT Blood Glucose.: 110 mg/dL (2023 11:05)  POCT Blood Glucose.: 118 mg/dL (2023 23:27)  POCT Blood Glucose.: 122 mg/dL (2023 19:36)    LIVER FUNCTIONS - ( 2023 05:30 )  Alb: 2.4 g/dL / Pro: 6.1 g/dL / ALK PHOS: 118 U/L / ALT: 12 U/L / AST: 19 U/L / GGT: x             RADIOLOGY & ADDITIONAL STUDIES:       SUBJECTIVE: Patient seen and evaluated.  no SOB or CP        MEDICATIONS  (STANDING):  acetaminophen   Oral Liquid .. 975 milliGRAM(s) Enteral Tube every 6 hours  albuterol/ipratropium for Nebulization 3 milliLiter(s) Nebulizer every 6 hours  artificial  tears Solution 1 Drop(s) Both EYES two times a day  aspirin  chewable 81 milliGRAM(s) Enteral Tube every 24 hours  caspofungin IVPB      caspofungin IVPB 50 milliGRAM(s) IV Intermittent every 24 hours  chlorhexidine 0.12% Liquid 15 milliLiter(s) Oral Mucosa every 12 hours  chlorhexidine 2% Cloths 1 Application(s) Topical daily  dextrose 5%. 1000 milliLiter(s) (100 mL/Hr) IV Continuous <Continuous>  dextrose 50% Injectable 25 Gram(s) IV Push once  ertapenem  IVPB 1000 milliGRAM(s) IV Intermittent every 24 hours  glucagon  Injectable 1 milliGRAM(s) IntraMuscular once  heparin   Injectable 5000 Unit(s) SubCutaneous every 8 hours  HYDROmorphone  Injectable 2 milliGRAM(s) IV Push every 4 hours  insulin lispro (ADMELOG) corrective regimen sliding scale   SubCutaneous every 6 hours  lacosamide Solution 250 milliGRAM(s) Oral two times a day  loperamide Liquid 2 milliGRAM(s) Oral daily  metoprolol tartrate Injectable 2.5 milliGRAM(s) IV Push every 6 hours  multivitamin/minerals/iron Oral Solution (CENTRUM) 15 milliLiter(s) Oral daily  pantoprazole  Injectable 40 milliGRAM(s) IV Push daily  sodium chloride 3%  Inhalation 4 milliLiter(s) Inhalation every 6 hours  vancomycin  IVPB 1500 milliGRAM(s) IV Intermittent every 12 hours    MEDICATIONS  (PRN):  dextrose Oral Gel 15 Gram(s) Oral once PRN Blood Glucose LESS THAN 70 milliGRAM(s)/deciliter  HYDROmorphone  Injectable 1 milliGRAM(s) IV Push every 4 hours PRN breakthrough pain  sodium chloride 0.9% lock flush 10 milliLiter(s) IV Push every 1 hour PRN Pre/post blood products, medications, blood draw, and to maintain line patency      Vital Signs Last 24 Hrs  T(C): 37.5 (2023 14:39), Max: 37.8 (2023 06:12)  T(F): 99.5 (2023 14:39), Max: 100 (2023 06:12)  HR: 104 (2023 15:00) (98 - 113)  BP: 151/79 (2023 15:00) (96/54 - 151/79)  BP(mean): 104 (2023 15:00) (70 - 104)  RR: 18 (2023 15:00) (8 - 60)  SpO2: 100% (2023 15:00) (96% - 110%)    Parameters below as of 2023 16:00  Patient On (Oxygen Delivery Method): tracheostomy collar    O2 Concentration (%): 40    Physical Exam:  GENERAL: NAD, resting comfortably in bed, awakens to voice and stimuli, hands flicker to command  HEENT: NCAT, MMM, s/p trach w copious thick secretions, cuff deflated  C/V: Normal rate, normal peripheral perfusion, systolic murmur  PULM: Nonlabored breathing, no respiratory distress, decreased sound bibasilar  ABD: Soft, ND, TTP but not peritonitic, drains SS green/brown tinge  EXTREM: WWP, dependent and extremities w 2+ edema, SCDs in place  NEURO: No focal deficits    I&O's Summary    2023 07:01  -  2023 07:00  --------------------------------------------------------  IN: 2300 mL / OUT: 1375 mL / NET: 925 mL    2023 07:01  -  2023 15:28  --------------------------------------------------------  IN: 1450 mL / OUT: 200 mL / NET: 1250 mL        LABS:                        6.3    19.81 )-----------( 699      ( 2023 05:37 )             20.4     06-19    144  |  105  |  28<H>  ----------------------------<  108<H>  3.4<L>   |  29  |  0.75    Ca    7.9<L>      2023 05:37  Phos  4.6     -  Mg     2.0     -    TPro  6.1  /  Alb  2.4<L>  /  TBili  0.6  /  DBili  0.3  /  AST  19  /  ALT  12  /  AlkPhos  118  06-18    PT/INR - ( 2023 05:37 )   PT: 15.3 sec;   INR: 1.28          PTT - ( 2023 05:37 )  PTT:36.4 sec  Urinalysis Basic - ( 2023 18:41 )    Color: Yellow / Appearance: Clear / S.015 / pH: x  Gluc: x / Ketone: NEGATIVE  / Bili: Negative / Urobili: 0.2 E.U./dL   Blood: x / Protein: Trace mg/dL / Nitrite: NEGATIVE   Leuk Esterase: NEGATIVE / RBC: < 5 /HPF / WBC < 5 /HPF   Sq Epi: x / Non Sq Epi: x / Bacteria: Present /HPF      CAPILLARY BLOOD GLUCOSE      POCT Blood Glucose.: 110 mg/dL (2023 11:05)  POCT Blood Glucose.: 118 mg/dL (2023 23:27)  POCT Blood Glucose.: 122 mg/dL (2023 19:36)    LIVER FUNCTIONS - ( 2023 05:30 )  Alb: 2.4 g/dL / Pro: 6.1 g/dL / ALK PHOS: 118 U/L / ALT: 12 U/L / AST: 19 U/L / GGT: x             RADIOLOGY & ADDITIONAL STUDIES:

## 2023-06-19 NOTE — PROGRESS NOTE ADULT - ASSESSMENT
54yo Male pt with PMH HTN, type A aortic dissection s/p Dacron grafts, AV resuspension in 2013, CAD s/p CABG x 1 SVG to RCA (5/2013 with Dr. Medina), seizure disorder (last episode on 7/4/22) S/P replacement of transverse aortic arch, second stage thoracic endovascular aortic repair, aorto-axillary bypass, AV replacement (bio 23mm), in APr 2023 and CABG x 1 (SVG-RCA) EF 75% (Ignacio, 3/20) now s/p 2nd state TEVAR on 5/5 for whom surgery was consulted for finding of acute pancreatitis with large peripancreatic/perigastric fluid collections on CTA abdomen 5/8 then acute hemorrhage starting 5/12/23 requiring 11 U pRBC over last 48 hours but with negative mesenteric angiogram 5/13/23. S/p paracentesis and LUQ collection aspiration (no drain per surgery) on 5/22/23. LUQ collection growing Klebsiella pneumoniae.    5/24/23:  LUQ drain placement by IR.     5/25/23:  LUQ drain upsizing. Placement of two additional 14 F drainage catheters in the left mid and lower abdomen. Placement of a left abdominal hematoma drain.     5/26/23:   Placement of a right ascites drain and right pelvic hematoma/abscess drain by IR. Upsized left abdomen hematoma drain and left pelvic abscess/hematoma drain to 16 Fr.    5/30/23:  LUQ drain placement.     5/31/23:  To OR for ex-lap and abdominal washout. Multiple IR drains removed as described above.     6/1/23:  Return to OR for washout and abdominal closure.     6/7/23:  CT CAP for infectious w/u showing decreased size of of abd fluid collections.     Plan:  -Continue to monitor drain outputs.   -IR will continue to follow.

## 2023-06-19 NOTE — PROGRESS NOTE ADULT - SUBJECTIVE AND OBJECTIVE BOX
1. 16 Fr teal LUQ peripancreatic abscess "Left Pancreatic CODIE Drain" placed on 5/24 (attached to gravity bag):   -0 cc serosanguinous output in 24 hrs. 0 cc serosanguinous output in 24 hrs.    2. 16 Fr teal LUQ hematoma "Left Abdominal Hematoma CODIE Drain" placed on 5/25 and upsized on 5/26:   -0 cc serosanguinous output in 24 hrs. 20 cc serosanguinous output in 24 hrs.

## 2023-06-19 NOTE — PROGRESS NOTE ADULT - SUBJECTIVE AND OBJECTIVE BOX
INTERVAL HPI/OVERNIGHT EVENTS: SABRINA.    ROS: UTO      ANTIBIOTICS/RELEVANT:    MEDICATIONS  (STANDING):  acetaminophen   Oral Liquid .. 975 milliGRAM(s) Enteral Tube every 6 hours  albuterol/ipratropium for Nebulization 3 milliLiter(s) Nebulizer every 6 hours  artificial  tears Solution 1 Drop(s) Both EYES two times a day  aspirin  chewable 81 milliGRAM(s) Enteral Tube every 24 hours  caspofungin IVPB      caspofungin IVPB 50 milliGRAM(s) IV Intermittent every 24 hours  chlorhexidine 0.12% Liquid 15 milliLiter(s) Oral Mucosa every 12 hours  chlorhexidine 2% Cloths 1 Application(s) Topical daily  dextrose 5%. 1000 milliLiter(s) (100 mL/Hr) IV Continuous <Continuous>  dextrose 50% Injectable 25 Gram(s) IV Push once  ertapenem  IVPB 1000 milliGRAM(s) IV Intermittent every 24 hours  glucagon  Injectable 1 milliGRAM(s) IntraMuscular once  heparin   Injectable 5000 Unit(s) SubCutaneous every 8 hours  HYDROmorphone  Injectable 2 milliGRAM(s) IV Push every 4 hours  insulin lispro (ADMELOG) corrective regimen sliding scale   SubCutaneous every 6 hours  lacosamide Solution 250 milliGRAM(s) Oral two times a day  loperamide Liquid 2 milliGRAM(s) Oral daily  metoprolol tartrate Injectable 2.5 milliGRAM(s) IV Push every 6 hours  multivitamin/minerals/iron Oral Solution (CENTRUM) 15 milliLiter(s) Oral daily  pantoprazole  Injectable 40 milliGRAM(s) IV Push daily  sodium chloride 3%  Inhalation 4 milliLiter(s) Inhalation every 6 hours  vancomycin  IVPB 1500 milliGRAM(s) IV Intermittent every 12 hours    MEDICATIONS  (PRN):  dextrose Oral Gel 15 Gram(s) Oral once PRN Blood Glucose LESS THAN 70 milliGRAM(s)/deciliter  HYDROmorphone  Injectable 1 milliGRAM(s) IV Push every 4 hours PRN breakthrough pain  sodium chloride 0.9% lock flush 10 milliLiter(s) IV Push every 1 hour PRN Pre/post blood products, medications, blood draw, and to maintain line patency        Vital Signs Last 24 Hrs  T(C): 37.5 (2023 14:39), Max: 37.8 (2023 06:12)  T(F): 99.5 (2023 14:39), Max: 100 (2023 06:12)  HR: 101 (2023 16:00) (98 - 113)  BP: 121/70 (2023 16:00) (96/54 - 151/79)  BP(mean): 88 (2023 16:00) (70 - 104)  RR: 14 (2023 16:00) (8 - 60)  SpO2: 100% (2023 16:00) (96% - 110%)    Parameters below as of 2023 17:00  Patient On (Oxygen Delivery Method): tracheostomy collar    O2 Concentration (%): 40    PHYSICAL EXAM:  Constitutional: NAD  Eyes: IVAN, EOMI  Ear/Nose/Throat: no oral lesion, no sinus tenderness on percussion	  Neck: no JVD, no lymphadenopathy, supple  Respiratory: CTA marti  Cardiovascular: S1S2 RRR, no murmurs  Gastrointestinal:soft, (+) BS, no HSM  Extremities:no e/e/c  Vascular: DP Pulse:	right normal; left normal      LABS:                        6.3    19.81 )-----------( 699      ( 2023 05:37 )             20.4     06-19    144  |  105  |  28<H>  ----------------------------<  108<H>  3.4<L>   |  29  |  0.75    Ca    7.9<L>      2023 05:37  Phos  4.6     06-19  Mg     2.0     06-19    TPro  6.1  /  Alb  2.4<L>  /  TBili  0.6  /  DBili  0.3  /  AST  19  /  ALT  12  /  AlkPhos  118  06-18    PT/INR - ( 2023 05:37 )   PT: 15.3 sec;   INR: 1.28          PTT - ( 2023 05:37 )  PTT:36.4 sec  Urinalysis Basic - ( 2023 18:41 )    Color: Yellow / Appearance: Clear / S.015 / pH: x  Gluc: x / Ketone: NEGATIVE  / Bili: Negative / Urobili: 0.2 E.U./dL   Blood: x / Protein: Trace mg/dL / Nitrite: NEGATIVE   Leuk Esterase: NEGATIVE / RBC: < 5 /HPF / WBC < 5 /HPF   Sq Epi: x / Non Sq Epi: x / Bacteria: Present /HPF        MICROBIOLOGY: Culture - Sputum . (23 @ 21:51)    Gram Stain:   Rare epithelial cells  Few WBC's  Few Yeast  Rare Gram Positive Rods   Specimen Source: Trach Asp Tracheal Aspirate   Culture Results:   Normal Respiratory Loni present        RADIOLOGY & ADDITIONAL STUDIES: reviewed

## 2023-06-19 NOTE — PROGRESS NOTE ADULT - ATTENDING COMMENTS
h.o Aortic dissection s/p AVR, TEVAR c/b hemorrhagic pancreatitis, Klebsiella septic shock, resolved ATN, metabolic encephalopathy  physical as above  continue J tube vital feeds with 2 liquid proteins  empiric restarting of ertapenem with vanco/caspofungin; he looks better today and platelet count/WBC have plateaued  cultures/CT nonrevealing; if stable tomorrow will withdraw one of these at a time and follow  continue Trach collar with cuff deflated today  diuresis  SQ heparin

## 2023-06-19 NOTE — PROGRESS NOTE ADULT - SUBJECTIVE AND OBJECTIVE BOX
ON: no BM, vanc level 11.6, increased vanc dose to 1500, Restarted imodium.   : TF resumed, hold off diuresis given BP borderline, re-ID consulted for fevers, WBC24, Plt rising, ID recs: Caspo, Ertapenem 1g q12, Vanc [id number]. Held Imodium as no BM today.     SUBJECTIVE: Patient seen and examined bedside; awake, nodding to questions, denied pain.    aspirin  chewable 81 milliGRAM(s) Enteral Tube every 24 hours  caspofungin IVPB      caspofungin IVPB 50 milliGRAM(s) IV Intermittent every 24 hours  chlorothiazide IVPB 500 milliGRAM(s) IV Intermittent once  ertapenem  IVPB 1000 milliGRAM(s) IV Intermittent every 24 hours  furosemide   IVPB 80 milliGRAM(s) IV Intermittent once  heparin   Injectable 5000 Unit(s) SubCutaneous every 8 hours  metoprolol tartrate Injectable 2.5 milliGRAM(s) IV Push every 6 hours  vancomycin  IVPB 1500 milliGRAM(s) IV Intermittent every 12 hours      Vital Signs Last 24 Hrs  T(C): 37.3 (2023 09:06), Max: 37.8 (2023 06:12)  T(F): 99.1 (2023 09:06), Max: 100 (2023 06:12)  HR: 100 (2023 09:00) (100 - 124)  BP: 106/60 (2023 09:00) (94/55 - 124/63)  BP(mean): 78 (2023 09:00) (69 - 89)  RR: 13 (2023 09:00) (8 - 26)  SpO2: 100% (2023 09:00) (96% - 100%)    Parameters below as of 2023 09:00  Patient On (Oxygen Delivery Method): tracheostomy collar    O2 Concentration (%): 40  I&O's Detail    2023 07:01  -  2023 07:00  --------------------------------------------------------  IN:    Enteral Tube Flush: 350 mL    IV PiggyBack: 250 mL    IV PiggyBack: 50 mL    IV PiggyBack: 250 mL    Vital1.0: 1400 mL  Total IN: 2300 mL    OUT:    Bulb (mL): 5 mL    Bulb (mL): 20 mL    Bulb (mL): 50 mL    Drain (mL): 0 mL    Drain (mL): 0 mL    Voided (mL): 1300 mL  Total OUT: 1375 mL    Total NET: 925 mL      2023 07:01  -  2023 10:31  --------------------------------------------------------  IN:    Enteral Tube Flush: 40 mL    IV PiggyBack: 300 mL    Vital1.0: 140 mL  Total IN: 480 mL    OUT:  Total OUT: 0 mL    Total NET: 480 mL      PE:    General: NAD, resting comfortably in bed  C/V: NSR, s1 s2  Pulm: Trach collar, nonlabored breathing, no respiratory distress  Abd: Soft, NTND; midline incision with every other suture removed; drains unchanged, all serosanguinous except with hematoma one which continues to be bilious  Extrem: [facility]        LABS:                        6.3    19.81 )-----------( 699      ( 2023 05:37 )             20.4     06-    144  |  105  |  28<H>  ----------------------------<  108<H>  3.4<L>   |  29  |  0.75    Ca    7.9<L>      2023 05:37  Phos  4.6     -  Mg     2.0     -    TPro  6.1  /  Alb  2.4<L>  /  TBili  0.6  /  DBili  0.3  /  AST  19  /  ALT  12  /  AlkPhos  118  06-18    PT/INR - ( 2023 05:37 )   PT: 15.3 sec;   INR: 1.28          PTT - ( 2023 05:37 )  PTT:36.4 sec  Urinalysis Basic - ( 2023 18:41 )    Color: Yellow / Appearance: Clear / S.015 / pH: x  Gluc: x / Ketone: NEGATIVE  / Bili: Negative / Urobili: 0.2 E.U./dL   Blood: x / Protein: Trace mg/dL / Nitrite: NEGATIVE   Leuk Esterase: NEGATIVE / RBC: < 5 /HPF / WBC < 5 /HPF   Sq Epi: x / Non Sq Epi: x / Bacteria: Present /HPF        RADIOLOGY & ADDITIONAL STUDIES:

## 2023-06-20 LAB
ANION GAP SERPL CALC-SCNC: 7 MMOL/L — SIGNIFICANT CHANGE UP (ref 5–17)
BUN SERPL-MCNC: 26 MG/DL — HIGH (ref 7–23)
CALCIUM SERPL-MCNC: 7.7 MG/DL — LOW (ref 8.4–10.5)
CHLORIDE SERPL-SCNC: 108 MMOL/L — SIGNIFICANT CHANGE UP (ref 96–108)
CO2 SERPL-SCNC: 30 MMOL/L — SIGNIFICANT CHANGE UP (ref 22–31)
CREAT SERPL-MCNC: 0.63 MG/DL — SIGNIFICANT CHANGE UP (ref 0.5–1.3)
EGFR: 113 ML/MIN/1.73M2 — SIGNIFICANT CHANGE UP
GLUCOSE BLDC GLUCOMTR-MCNC: 102 MG/DL — HIGH (ref 70–99)
GLUCOSE BLDC GLUCOMTR-MCNC: 106 MG/DL — HIGH (ref 70–99)
GLUCOSE BLDC GLUCOMTR-MCNC: 106 MG/DL — HIGH (ref 70–99)
GLUCOSE BLDC GLUCOMTR-MCNC: 81 MG/DL — SIGNIFICANT CHANGE UP (ref 70–99)
GLUCOSE SERPL-MCNC: 103 MG/DL — HIGH (ref 70–99)
HCT VFR BLD CALC: 21.6 % — LOW (ref 39–50)
HGB BLD-MCNC: 6.8 G/DL — CRITICAL LOW (ref 13–17)
MAGNESIUM SERPL-MCNC: 2 MG/DL — SIGNIFICANT CHANGE UP (ref 1.6–2.6)
MCHC RBC-ENTMCNC: 29.3 PG — SIGNIFICANT CHANGE UP (ref 27–34)
MCHC RBC-ENTMCNC: 31.5 GM/DL — LOW (ref 32–36)
MCV RBC AUTO: 93.1 FL — SIGNIFICANT CHANGE UP (ref 80–100)
NRBC # BLD: 0 /100 WBCS — SIGNIFICANT CHANGE UP (ref 0–0)
PHOSPHATE SERPL-MCNC: 3.2 MG/DL — SIGNIFICANT CHANGE UP (ref 2.5–4.5)
PLATELET # BLD AUTO: 745 K/UL — HIGH (ref 150–400)
POTASSIUM SERPL-MCNC: 3.6 MMOL/L — SIGNIFICANT CHANGE UP (ref 3.5–5.3)
POTASSIUM SERPL-SCNC: 3.6 MMOL/L — SIGNIFICANT CHANGE UP (ref 3.5–5.3)
RBC # BLD: 2.32 M/UL — LOW (ref 4.2–5.8)
RBC # FLD: 17.8 % — HIGH (ref 10.3–14.5)
SODIUM SERPL-SCNC: 145 MMOL/L — SIGNIFICANT CHANGE UP (ref 135–145)
VANCOMYCIN TROUGH SERPL-MCNC: 29.9 UG/ML — CRITICAL HIGH (ref 10–20)
WBC # BLD: 19.35 K/UL — HIGH (ref 3.8–10.5)
WBC # FLD AUTO: 19.35 K/UL — HIGH (ref 3.8–10.5)

## 2023-06-20 PROCEDURE — 99232 SBSQ HOSP IP/OBS MODERATE 35: CPT

## 2023-06-20 PROCEDURE — 99233 SBSQ HOSP IP/OBS HIGH 50: CPT | Mod: GC

## 2023-06-20 RX ORDER — CHLOROTHIAZIDE 500 MG
500 TABLET ORAL ONCE
Refills: 0 | Status: COMPLETED | OUTPATIENT
Start: 2023-06-20 | End: 2023-06-20

## 2023-06-20 RX ORDER — LOPERAMIDE HCL 2 MG
2 TABLET ORAL EVERY 12 HOURS
Refills: 0 | Status: DISCONTINUED | OUTPATIENT
Start: 2023-06-20 | End: 2023-06-21

## 2023-06-20 RX ORDER — FUROSEMIDE 40 MG
80 TABLET ORAL ONCE
Refills: 0 | Status: COMPLETED | OUTPATIENT
Start: 2023-06-20 | End: 2023-06-20

## 2023-06-20 RX ORDER — FUROSEMIDE 40 MG
80 TABLET ORAL DAILY
Refills: 0 | Status: DISCONTINUED | OUTPATIENT
Start: 2023-06-20 | End: 2023-06-20

## 2023-06-20 RX ORDER — LOPERAMIDE HCL 2 MG
2 TABLET ORAL EVERY 12 HOURS
Refills: 0 | Status: DISCONTINUED | OUTPATIENT
Start: 2023-06-20 | End: 2023-06-20

## 2023-06-20 RX ORDER — POTASSIUM CHLORIDE 20 MEQ
40 PACKET (EA) ORAL ONCE
Refills: 0 | Status: COMPLETED | OUTPATIENT
Start: 2023-06-20 | End: 2023-06-20

## 2023-06-20 RX ADMIN — HEPARIN SODIUM 5000 UNIT(S): 5000 INJECTION INTRAVENOUS; SUBCUTANEOUS at 05:13

## 2023-06-20 RX ADMIN — PANTOPRAZOLE SODIUM 40 MILLIGRAM(S): 20 TABLET, DELAYED RELEASE ORAL at 11:58

## 2023-06-20 RX ADMIN — HYDROMORPHONE HYDROCHLORIDE 2 MILLIGRAM(S): 2 INJECTION INTRAMUSCULAR; INTRAVENOUS; SUBCUTANEOUS at 22:36

## 2023-06-20 RX ADMIN — HYDROMORPHONE HYDROCHLORIDE 2 MILLIGRAM(S): 2 INJECTION INTRAMUSCULAR; INTRAVENOUS; SUBCUTANEOUS at 18:00

## 2023-06-20 RX ADMIN — Medication 3 MILLILITER(S): at 00:39

## 2023-06-20 RX ADMIN — Medication 2 MILLIGRAM(S): at 17:39

## 2023-06-20 RX ADMIN — Medication 3 MILLILITER(S): at 11:21

## 2023-06-20 RX ADMIN — Medication 81 MILLIGRAM(S): at 11:55

## 2023-06-20 RX ADMIN — Medication 2.5 MILLIGRAM(S): at 23:46

## 2023-06-20 RX ADMIN — Medication 1 DROP(S): at 17:41

## 2023-06-20 RX ADMIN — LACOSAMIDE 250 MILLIGRAM(S): 50 TABLET ORAL at 17:40

## 2023-06-20 RX ADMIN — HYDROMORPHONE HYDROCHLORIDE 2 MILLIGRAM(S): 2 INJECTION INTRAMUSCULAR; INTRAVENOUS; SUBCUTANEOUS at 10:14

## 2023-06-20 RX ADMIN — Medication 1 DROP(S): at 06:34

## 2023-06-20 RX ADMIN — Medication 975 MILLIGRAM(S): at 05:12

## 2023-06-20 RX ADMIN — Medication 975 MILLIGRAM(S): at 23:46

## 2023-06-20 RX ADMIN — Medication 3 MILLILITER(S): at 23:36

## 2023-06-20 RX ADMIN — HYDROMORPHONE HYDROCHLORIDE 2 MILLIGRAM(S): 2 INJECTION INTRAMUSCULAR; INTRAVENOUS; SUBCUTANEOUS at 05:15

## 2023-06-20 RX ADMIN — Medication 975 MILLIGRAM(S): at 17:40

## 2023-06-20 RX ADMIN — Medication 100 MILLIGRAM(S): at 10:09

## 2023-06-20 RX ADMIN — SODIUM CHLORIDE 4 MILLILITER(S): 9 INJECTION INTRAMUSCULAR; INTRAVENOUS; SUBCUTANEOUS at 17:25

## 2023-06-20 RX ADMIN — SODIUM CHLORIDE 4 MILLILITER(S): 9 INJECTION INTRAMUSCULAR; INTRAVENOUS; SUBCUTANEOUS at 05:49

## 2023-06-20 RX ADMIN — Medication 3 MILLILITER(S): at 05:49

## 2023-06-20 RX ADMIN — Medication 975 MILLIGRAM(S): at 12:07

## 2023-06-20 RX ADMIN — Medication 975 MILLIGRAM(S): at 11:56

## 2023-06-20 RX ADMIN — HEPARIN SODIUM 5000 UNIT(S): 5000 INJECTION INTRAVENOUS; SUBCUTANEOUS at 13:52

## 2023-06-20 RX ADMIN — HYDROMORPHONE HYDROCHLORIDE 2 MILLIGRAM(S): 2 INJECTION INTRAMUSCULAR; INTRAVENOUS; SUBCUTANEOUS at 10:09

## 2023-06-20 RX ADMIN — CHLORHEXIDINE GLUCONATE 15 MILLILITER(S): 213 SOLUTION TOPICAL at 17:41

## 2023-06-20 RX ADMIN — Medication 40 MILLIEQUIVALENT(S): at 08:09

## 2023-06-20 RX ADMIN — SODIUM CHLORIDE 4 MILLILITER(S): 9 INJECTION INTRAMUSCULAR; INTRAVENOUS; SUBCUTANEOUS at 11:22

## 2023-06-20 RX ADMIN — Medication 2 MILLIGRAM(S): at 06:35

## 2023-06-20 RX ADMIN — CHLORHEXIDINE GLUCONATE 15 MILLILITER(S): 213 SOLUTION TOPICAL at 05:15

## 2023-06-20 RX ADMIN — HYDROMORPHONE HYDROCHLORIDE 2 MILLIGRAM(S): 2 INJECTION INTRAMUSCULAR; INTRAVENOUS; SUBCUTANEOUS at 14:00

## 2023-06-20 RX ADMIN — ERTAPENEM SODIUM 120 MILLIGRAM(S): 1 INJECTION, POWDER, LYOPHILIZED, FOR SOLUTION INTRAMUSCULAR; INTRAVENOUS at 22:21

## 2023-06-20 RX ADMIN — Medication 3 MILLILITER(S): at 17:25

## 2023-06-20 RX ADMIN — Medication 2.5 MILLIGRAM(S): at 17:39

## 2023-06-20 RX ADMIN — HYDROMORPHONE HYDROCHLORIDE 2 MILLIGRAM(S): 2 INJECTION INTRAMUSCULAR; INTRAVENOUS; SUBCUTANEOUS at 13:53

## 2023-06-20 RX ADMIN — Medication 975 MILLIGRAM(S): at 18:47

## 2023-06-20 RX ADMIN — HEPARIN SODIUM 5000 UNIT(S): 5000 INJECTION INTRAVENOUS; SUBCUTANEOUS at 22:21

## 2023-06-20 RX ADMIN — Medication 975 MILLIGRAM(S): at 06:33

## 2023-06-20 RX ADMIN — Medication 2.5 MILLIGRAM(S): at 11:57

## 2023-06-20 RX ADMIN — HYDROMORPHONE HYDROCHLORIDE 2 MILLIGRAM(S): 2 INJECTION INTRAMUSCULAR; INTRAVENOUS; SUBCUTANEOUS at 17:40

## 2023-06-20 RX ADMIN — Medication 15 MILLILITER(S): at 12:07

## 2023-06-20 RX ADMIN — Medication 2.5 MILLIGRAM(S): at 05:13

## 2023-06-20 RX ADMIN — HYDROMORPHONE HYDROCHLORIDE 2 MILLIGRAM(S): 2 INJECTION INTRAMUSCULAR; INTRAVENOUS; SUBCUTANEOUS at 06:34

## 2023-06-20 RX ADMIN — CHLORHEXIDINE GLUCONATE 1 APPLICATION(S): 213 SOLUTION TOPICAL at 05:15

## 2023-06-20 RX ADMIN — HYDROMORPHONE HYDROCHLORIDE 2 MILLIGRAM(S): 2 INJECTION INTRAMUSCULAR; INTRAVENOUS; SUBCUTANEOUS at 22:21

## 2023-06-20 RX ADMIN — SODIUM CHLORIDE 4 MILLILITER(S): 9 INJECTION INTRAMUSCULAR; INTRAVENOUS; SUBCUTANEOUS at 00:39

## 2023-06-20 RX ADMIN — SODIUM CHLORIDE 4 MILLILITER(S): 9 INJECTION INTRAMUSCULAR; INTRAVENOUS; SUBCUTANEOUS at 23:37

## 2023-06-20 RX ADMIN — Medication 80 MILLIGRAM(S): at 10:08

## 2023-06-20 RX ADMIN — LACOSAMIDE 250 MILLIGRAM(S): 50 TABLET ORAL at 09:08

## 2023-06-20 NOTE — PROGRESS NOTE ADULT - SUBJECTIVE AND OBJECTIVE BOX
INTERVAL HPI/OVERNIGHT EVENTS: SABRINA.    ROS: UTO      ANTIBIOTICS/RELEVANT:    MEDICATIONS  (STANDING):  acetaminophen   Oral Liquid .. 975 milliGRAM(s) Enteral Tube every 6 hours  albuterol/ipratropium for Nebulization 3 milliLiter(s) Nebulizer every 6 hours  artificial  tears Solution 1 Drop(s) Both EYES two times a day  aspirin  chewable 81 milliGRAM(s) Enteral Tube every 24 hours  chlorhexidine 0.12% Liquid 15 milliLiter(s) Oral Mucosa every 12 hours  chlorhexidine 2% Cloths 1 Application(s) Topical daily  dextrose 5%. 1000 milliLiter(s) (100 mL/Hr) IV Continuous <Continuous>  dextrose 50% Injectable 25 Gram(s) IV Push once  ertapenem  IVPB 1000 milliGRAM(s) IV Intermittent every 24 hours  glucagon  Injectable 1 milliGRAM(s) IntraMuscular once  heparin   Injectable 5000 Unit(s) SubCutaneous every 8 hours  HYDROmorphone  Injectable 2 milliGRAM(s) IV Push every 4 hours  insulin lispro (ADMELOG) corrective regimen sliding scale   SubCutaneous every 6 hours  lacosamide Solution 250 milliGRAM(s) Oral two times a day  loperamide Liquid 2 milliGRAM(s) Oral every 12 hours  metoprolol tartrate Injectable 2.5 milliGRAM(s) IV Push every 6 hours  multivitamin/minerals/iron Oral Solution (CENTRUM) 15 milliLiter(s) Oral daily  pantoprazole  Injectable 40 milliGRAM(s) IV Push daily  sodium chloride 3%  Inhalation 4 milliLiter(s) Inhalation every 6 hours    MEDICATIONS  (PRN):  dextrose Oral Gel 15 Gram(s) Oral once PRN Blood Glucose LESS THAN 70 milliGRAM(s)/deciliter  HYDROmorphone  Injectable 1 milliGRAM(s) IV Push every 4 hours PRN breakthrough pain  sodium chloride 0.9% lock flush 10 milliLiter(s) IV Push every 1 hour PRN Pre/post blood products, medications, blood draw, and to maintain line patency        Vital Signs Last 24 Hrs  T(C): 37.2 (20 Jun 2023 12:00), Max: 38 (19 Jun 2023 18:05)  T(F): 99 (20 Jun 2023 12:00), Max: 100.4 (19 Jun 2023 18:05)  HR: 102 (20 Jun 2023 16:00) (98 - 112)  BP: 124/72 (20 Jun 2023 16:00) (108/68 - 154/73)  BP(mean): 93 (20 Jun 2023 16:00) (83 - 106)  RR: 19 (20 Jun 2023 16:00) (12 - 26)  SpO2: 98% (20 Jun 2023 16:00) (94% - 100%)    Parameters below as of 20 Jun 2023 16:00  Patient On (Oxygen Delivery Method): tracheostomy collar  O2 Flow (L/min): 10  O2 Concentration (%): 40    PHYSICAL EXAM:  Constitutional: NAD  Eyes: IVAN, EOMI  Ear/Nose/Throat: no oral lesion, no sinus tenderness on percussion	  Neck: no JVD, no lymphadenopathy, supple  Respiratory: CTA marti  Cardiovascular: S1S2 RRR, no murmurs  Gastrointestinal:soft, (+) BS, no HSM  Extremities:no e/e/c  Vascular: DP Pulse:	right normal; left normal      LABS:                        6.8    19.35 )-----------( 745      ( 20 Jun 2023 05:30 )             21.6     06-20    145  |  108  |  26<H>  ----------------------------<  103<H>  3.6   |  30  |  0.63    Ca    7.7<L>      20 Jun 2023 05:30  Phos  3.2     06-20  Mg     2.0     06-20      PT/INR - ( 19 Jun 2023 05:37 )   PT: 15.3 sec;   INR: 1.28          PTT - ( 19 Jun 2023 05:37 )  PTT:36.4 sec      MICROBIOLOGY: reviewed    RADIOLOGY & ADDITIONAL STUDIES: reviewed 11

## 2023-06-20 NOTE — PROGRESS NOTE ADULT - SUBJECTIVE AND OBJECTIVE BOX
1. 16 Fr teal LUQ peripancreatic abscess "Left Pancreatic CODIE Drain" placed on 5/24 (attached to gravity bag):   -0 cc serosanguinous output in 24 hrs. 0 cc serosanguinous output in 24 hrs.    2. 16 Fr teal LUQ hematoma "Left Abdominal Hematoma CODIE Drain" placed on 5/25 and upsized on 5/26:   -0 cc serosanguinous output in 24 hrs. 5 cc serosanguinous output in 24 hrs.  Both flushed easily with 3-5 cc NS.

## 2023-06-20 NOTE — PROGRESS NOTE ADULT - ASSESSMENT
53yMale w/ PMHx of HTN, type A aortic dissection s/p Dacron grafts, AV resuspension in 2013, CAD s/p CABG x 1 SVG to RCA (5/2013 with Dr. Medina), seizure disorder, recent admission 3/20-4/14 for replacement of transverse aortic arch, second stage TEVAR and aorto-axillary bypass, AV replacement (bio 23mm), and CABG x 1 (SVG-RCA). Pt found to have endo leak and taken to OR for TEVAR revision on 5/5, Post op course c/b Klebsiella bacteremia (5/15), resp failure requiring intubation on 5/18, Mucus plugging s/p Bronch 5/19 and PEA arrest. Post operatively pt also found to have hemorrhagic pancreatitis with venu-pancreatic abscess possibly 2* to Depakote therefore s/p IR aspiration of peripancreatic fluid collection and paracentesis on 5/22, IR venu-pancreatic abscess drainage and placement of drainage catheter on 5/24. Pt then transferred from CTICU to SICU for further mgmt of hemorrhagic pancreatitis on 5/25. s/p IR placement of 2 new drainage catheters and catheter exchange on 5/26. LUQ drainage 5/30. OR 5/31 exlap washout & replacement of 3 new JPs and abthera vac with open abdomen. RTOR 6/1, no bleeding, evac of old blood, placement of feeding J-tube. failed extubation s/p trach 6/5 with pneumonia.      NEURO: Pain control: switch Dilaudid 2 q4, w/ additional PRN dilaudid, d/c'd fent gtt, Precedex gtt, d/c'd ketamine (6/14). Hx seizures: epilepsy recs appreciated, Cont vimpat. Depakote weaned off due to concerns for drug-related hemorrhagic pancreatitis. Repeat EEG (ordered)  HEENT: Artificial tears, Torticollis - PT/OT following.   CV: Sepsis without shock, hold off on diruesis as SBP 100s. MAP > 65. s/p PEA arrest on 5/24 night. TTE (6/14) LVEF 75%, LVH, biatrial enlargement and elevated PA pressure (elevated from previous), mild to mod MR/TR . C/w ASA 81mg. Metoprolol 2.5q6. Diuresis (Lasix 80BID, chlorthiazide)  PULM: ARDS resolved - off NO, s/p multiple Mucus plug/ Lung collapse s/p bronch x3 (most recently on 5/26) most likely from outward obstruction: Cont rotating pt around the clock. Cont Duonebs, 3% saline. failed extubation 6/3: s/p Trach 6/5: Tolerating 40% Trach Collar (backup CPAP 8/PS 5).  GI/FEN: TF Vital 1.0 at 70cc via feeding J-tube with imodium 2mg BID (no BMs), Protonix BID, Hemorrhagic pancreatitis: s/p multiple drain placements and paracentesis by IR team. Cdiff negative (6/19)  : AKF on HD s/p CVVHD, now w/ renal recovery, off of aquaphoresis for fluid overload. condom catheter.  HEME: Acute blood loss anemia requiring multiple transfusions  ENDO: mISS  ID: AFebrile overnight was restarted on Ertapenem and Caspofungin. Recent CT A/P without significant intra-abdominal pathology, favoring respiratory etiology given secretions and Sputum growing GPR and yeast on 7/17. Resume Caspo, Erta, vanc (6/18--)  PRIOR ABX: Ertapenem (6/13-6/16), meropenem (6/9-6/13), vanco x 1 (6/9), caspofungin (6/9-6/12), previous Kleb bacteremia from 5/15 & 5/17, subsequent bld cx negative, PNA w/ bronch cx kleb, enterobacter (zosyn, erta resistant), E faecalis, strep angionosus from 5/19, pancreatic abscess cx pan-sensitive kleb 5/22. completed Ceftriaxone( 6/5- 6/15) Chloe (5/21-6/5),  Caspo (5/23-5/30), Ampicillin (5/21- 5/27).   PPX: SCDs, SQH  LINES: PIVs // L rad kalpana (5/31-6/15), 5Fr PIV in LUE (6/13-15), Lsubclav HD Cath (5/31-6/12), RIJ TLC (5/22-5/27), RIJ HD cath (5/22-31), R Ax kalpana(5/12-5/31), LIJ TLC (5/23-6/1), RIJ TLC (6/1-13)   WOUNDS/DRAINS: Right OR CODIE, 2 left OR CODIE, left IR hematoma drain (bilious), left IR pancreatic drain  PT: PT/OT ordered 6/5. Out of bed to stretcher chair  Dispo: SICU

## 2023-06-20 NOTE — PROGRESS NOTE ADULT - ASSESSMENT
53M h/o seizure d/o, aortic dissection s/p AVR, aortic graft revision 3/20/23, 2nd stage TEVAR 5/5/23, course c/b peripancreatic/RP hemorrhage/hematoma formation s/p multiple washouts--last ex-lap 6/1 (CT with near complete interval resolution of venu-pancreatic collection). s/p trach. Course c/b VAP 2/2 Enterobacter and Klebsiella. Fever, increased leukocytosis despite targeted therapy with ertapenem; CT with bibasilar consolidations ?aspiration pneumonia.  - f/u bcx 6/17--ngtd  - f/u sputum cx 6/17--GS yeast, GPR--cx normal shraddha to date  - reasonable to extend course of ertapenem 1g IV q24h to complete 14d course thru 6/23  - given elevated vancomycin trough, advise continue to hold vancomycin today, check level with AM labs 6/21 and if < 20, then restart at 1.5g IV q24h (anticipate completion of empiric vancomycin course 6/22)  - d/c caspofungin

## 2023-06-20 NOTE — PROGRESS NOTE ADULT - SUBJECTIVE AND OBJECTIVE BOX
SUBJECTIVE: Patient seen and examined at bedside.  There were no acute events overnight; however, diarrhea has worsened resulting in placement of a rectal tube, increase of Imodium to BID and C. difficile cultures sent.  Patient appears comfortable and although become mildly irritated with physical examination.    aspirin  chewable 81 milliGRAM(s) Enteral Tube every 24 hours  caspofungin IVPB      caspofungin IVPB 50 milliGRAM(s) IV Intermittent every 24 hours  ertapenem  IVPB 1000 milliGRAM(s) IV Intermittent every 24 hours  heparin   Injectable 5000 Unit(s) SubCutaneous every 8 hours  metoprolol tartrate Injectable 2.5 milliGRAM(s) IV Push every 6 hours    MEDICATIONS  (PRN):  dextrose Oral Gel 15 Gram(s) Oral once PRN Blood Glucose LESS THAN 70 milliGRAM(s)/deciliter  HYDROmorphone  Injectable 1 milliGRAM(s) IV Push every 4 hours PRN breakthrough pain  sodium chloride 0.9% lock flush 10 milliLiter(s) IV Push every 1 hour PRN Pre/post blood products, medications, blood draw, and to maintain line patency      I&O's Detail    19 Jun 2023 07:01  -  20 Jun 2023 07:00  --------------------------------------------------------  IN:    Enteral Tube Flush: 160 mL    IV PiggyBack: 500 mL    IV PiggyBack: 50 mL    IV PiggyBack: 260 mL    IV PiggyBack: 50 mL    PRBCs (Packed Red Blood Cells): 300 mL    Vital1.0: 1470 mL  Total IN: 2790 mL    OUT:    Bulb (mL): 10 mL    Bulb (mL): 20 mL    Bulb (mL): 10 mL    Drain (mL): 5 mL    Rectal Tube (mL): 1300 mL    Voided (mL): 1925 mL  Total OUT: 3270 mL    Total NET: -480 mL      20 Jun 2023 07:01  -  20 Jun 2023 08:18  --------------------------------------------------------  IN:    Vital1.0: 70 mL  Total IN: 70 mL    OUT:  Total OUT: 0 mL    Total NET: 70 mL          T(C): 37.3 (06-20-23 @ 00:17), Max: 38 (06-19-23 @ 18:05)  HR: 112 (06-20-23 @ 08:00) (98 - 112)  BP: 130/75 (06-20-23 @ 08:00) (106/60 - 151/79)  RR: 19 (06-20-23 @ 08:00) (11 - 60)  SpO2: 95% (06-20-23 @ 08:00) (95% - 110%)    GENERAL: NAD, Resting comfortably in bed, awake, opens eyes spontaneously  HEENT: NCAT, MMM, Normal conjunctiva, tracheostomy is clean/dry/intact, improving torticollis (favoring L side)  RESP: Nonlabored breathing on trach collar, No respiratory distress, Intermittent cough  CARD: Normal rate, Normal peripheral perfusion  GI: Soft, ND, NT, No guarding, No rebound tenderness, stapled midline incision well-healed, CODIE drains (1-3) minimal serous output and 2/3 displaced and resutures ~10cm out, IR drain 1 (hematoma) purulent/bilious output, and IR drain to bag minimal serosanguinous  EXTREM: WWP, No edema, No gross deformity of extremities  SKIN: No rashes, no lesions  NEURO: Awake and alert, No focal motor or sensory deficits  PSYCH: Affect and characteristics of appearance, verbalizations, and behaviors are appropriate    LABS:                        6.8    19.35 )-----------( 745      ( 20 Jun 2023 05:30 )             21.6     06-20    145  |  108  |  26<H>  ----------------------------<  103<H>  3.6   |  30  |  0.63    Ca    7.7<L>      20 Jun 2023 05:30  Phos  3.2     06-20  Mg     2.0     06-20      PT/INR - ( 19 Jun 2023 05:37 )   PT: 15.3 sec;   INR: 1.28          PTT - ( 19 Jun 2023 05:37 )  PTT:36.4 sec      RADIOLOGY & ADDITIONAL STUDIES:      Culture - Sputum (collected 06-17-23 @ 21:51)  Source: Trach Asp Tracheal Aspirate  Gram Stain (06-17-23 @ 22:37):    Rare epithelial cells    Few WBC's    Few Yeast    Rare Gram Positive Rods  Final Report (06-19-23 @ 09:50):    Normal Respiratory Loni present    Culture - Blood (collected 06-17-23 @ 18:41)  Source: .Blood Blood  Preliminary Report (06-19-23 @ 20:00):    No growth at 2 days.    Culture - Blood (collected 06-17-23 @ 18:41)  Source: .Blood Blood  Preliminary Report (06-19-23 @ 20:00):    No growth at 2 days.

## 2023-06-20 NOTE — CHART NOTE - NSCHARTNOTEFT_GEN_A_CORE
Admitting Diagnosis:   Patient is a 53y old  Male who presents with a chief complaint of endoleak, abdominal pain (2023 08:17)      PAST MEDICAL & SURGICAL HISTORY:  HTN (hypertension)      Aortic dissection      CAD (coronary artery disease)      Seizure disorder      Seizure disorder      Hypertension      Status post endovascular aneurysm repair (EVAR)      S/P aortic bifurcation bypass graft      S/P CABG x 1          Current Nutrition Order:  Vital 1.0 @70ml/hr x 24hrs; provides 1680ml total volume, 1680kcals, 67.2g protein, 1401ml free water daily   + 1 LPS BID; 200kcals, 30g protein    PO Intake: Good (%) [   ]  Fair (50-75%) [   ] Poor (<25%) [   ]- N/A    GI Issues: large, liquid BM s/p rectal tube placement    Pain: No pain/absence of nonverbal indicators of pain    Skin Integrity:  unstageable PUs to sacrum & coccyx, DTIs to scapula, L ear, stage II PU to penis, CODIE drains x 3, pancreatic drain, J tube, 2+ edema to extremities    Labs:       145  |  108  |  26<H>  ----------------------------<  103<H>  3.6   |  30  |  0.63    Ca    7.7<L>      2023 05:30  Phos  3.2     20  Mg     2.0     -20      CAPILLARY BLOOD GLUCOSE      POCT Blood Glucose.: 106 mg/dL (2023 05:22)  POCT Blood Glucose.: 95 mg/dL (2023 23:38)  POCT Blood Glucose.: 153 mg/dL (2023 19:04)  POCT Blood Glucose.: 110 mg/dL (2023 11:05)      Medications:  MEDICATIONS  (STANDING):  acetaminophen   Oral Liquid .. 975 milliGRAM(s) Enteral Tube every 6 hours  albuterol/ipratropium for Nebulization 3 milliLiter(s) Nebulizer every 6 hours  artificial  tears Solution 1 Drop(s) Both EYES two times a day  aspirin  chewable 81 milliGRAM(s) Enteral Tube every 24 hours  caspofungin IVPB 50 milliGRAM(s) IV Intermittent every 24 hours  caspofungin IVPB      chlorhexidine 0.12% Liquid 15 milliLiter(s) Oral Mucosa every 12 hours  chlorhexidine 2% Cloths 1 Application(s) Topical daily  dextrose 5%. 1000 milliLiter(s) (100 mL/Hr) IV Continuous <Continuous>  dextrose 50% Injectable 25 Gram(s) IV Push once  ertapenem  IVPB 1000 milliGRAM(s) IV Intermittent every 24 hours  glucagon  Injectable 1 milliGRAM(s) IntraMuscular once  heparin   Injectable 5000 Unit(s) SubCutaneous every 8 hours  HYDROmorphone  Injectable 2 milliGRAM(s) IV Push every 4 hours  insulin lispro (ADMELOG) corrective regimen sliding scale   SubCutaneous every 6 hours  lacosamide Solution 250 milliGRAM(s) Oral two times a day  loperamide Liquid 2 milliGRAM(s) Oral every 12 hours  metoprolol tartrate Injectable 2.5 milliGRAM(s) IV Push every 6 hours  multivitamin/minerals/iron Oral Solution (CENTRUM) 15 milliLiter(s) Oral daily  pantoprazole  Injectable 40 milliGRAM(s) IV Push daily  sodium chloride 3%  Inhalation 4 milliLiter(s) Inhalation every 6 hours    MEDICATIONS  (PRN):  dextrose Oral Gel 15 Gram(s) Oral once PRN Blood Glucose LESS THAN 70 milliGRAM(s)/deciliter  HYDROmorphone  Injectable 1 milliGRAM(s) IV Push every 4 hours PRN breakthrough pain  sodium chloride 0.9% lock flush 10 milliLiter(s) IV Push every 1 hour PRN Pre/post blood products, medications, blood draw, and to maintain line patency      Weight: 90.9kg  --- no new wt obtained  70kg dosing wt May &     Weight Change: wt change ? 2/2 edema, need reweight to confirm. recommend daily weights for trending    Estimated energy needs:   Previous wt of 70kg used for energy calculations as falls within % IBW  Needs adjusted for age, clinical conditions, pressure ulcers  25-30 kcals/[id number] kcals/day  1.2-1.7 g/k-119g protein/day  Fluids per team.    Subjective:   53yMale w/ PMHx of HTN, type A aortic dissection s/p Dacron grafts, AV resuspension in , CAD s/p CABG x 1 SVG to RCA (2013 with Dr. Adam), seizure disorder, recent admission 3/20- for replacement of transverse aortic arch, second stage TEVAR and aorto-axillary bypass, AV replacement (bio 23mm), and CABG x 1 (SVG-RCA). Pt found to have endo leak and taken to OR for TEVAR revision on , Post op course c/b Klebsiella bacteremia (5/15), resp failure requiring intubation on , Mucus plugging s/p Bronch  and PEA arrest. Post operatively pt also found to have hemorrhagic pancreatitis with venu-pancreatic abscess possibly 2* to Depakote therefore s/p IR aspiration of peripancreatic fluid collection and paracentesis on , IR venu-pancreatic abscess drainage and placement of drainage catheter on . Pt then transferred from CTICU to SICU for further mgmt of hemorrhagic pancreatitis on . s/p IR placement of 2 new drainage catheters and catheter exchange on . LUQ drainage . OR  exlap washout & replacement of 3 new JPs and abthera vac with open abdomen. RTOR , no bleeding, evac of old blood, placement of feeding J-tube. Failed extubation 6/3: s/p Trach .    Chart reviewed. Case discussed with IDT. EN remains primary means to nutrition, on trach collar. TMax: 38.0 C. HR trending high. BP WNL, MAPs trending >65mmHg. I&Os x 24hrs: 2790 [1470ml TF intake +160ml flush] / 3270 [1925ml uop + 1300ml rectal tube output, 10 + 20 + 10 + 5ml drain output] = -480ml net. TF seen running at full rate. Noted increase in diarrhea in last 24hrs, rectal tube placed, pending c. diff cultures.  Labs: Na 145, trending up. K+ 3.6, borderline- monitor & replenish prn. BUN 26H, Creat & GFR WNL. PT & INR H, WBC H, RBC L, Hgb 6.8LL/Hct 21.6L. lactate 1.0. POC BG trending  mg/dL x 3 days. Meds reviewed. on immodium BID, MVI/minerals/iron, protonix inj. RDN will continue to monitor, reassess, and intervene as appropriate.     Previous Nutrition Diagnosis:  Increased nutrients RT increased demands AEB Vent, Pressure ulcers    Active [ X ]  Resolved [   ]    If resolved, new PES:     Goal:  Pt will meet at least 75% of protein & energy needs via most appropriate route for nutrition     Recommendations:  1. continue EN via PEJ as tolerated  - Vital 1.0 @70ml/hr x 24hrs; 1680 ml total volume, 1680kcals, 67.2g protein, 1404ml free water daily  - can increase LPS to TID [+ 300kcals, 30g protein] to provide 28.2 kcals/kg 1.6g protein/kg  2. GI  - if c. diffe ruled out- consider banatrol daily  3. Hydration per team  - Sodium borderline high  - monitor fluid/volume status, adjust FWF prn for fluid maintenance  4. Monitor lytes, replete prn  5. I&Os  6. Monitor chemistry, GI function, skin integrity    Education: N/A    Risk Level: High [  X ] Moderate [   ] Low [   ]

## 2023-06-20 NOTE — PROGRESS NOTE ADULT - SUBJECTIVE AND OBJECTIVE BOX
SUBJECTIVE: Patient seen and evaluated.        MEDICATIONS  (STANDING):  acetaminophen   Oral Liquid .. 975 milliGRAM(s) Enteral Tube every 6 hours  albuterol/ipratropium for Nebulization 3 milliLiter(s) Nebulizer every 6 hours  artificial  tears Solution 1 Drop(s) Both EYES two times a day  aspirin  chewable 81 milliGRAM(s) Enteral Tube every 24 hours  caspofungin IVPB 50 milliGRAM(s) IV Intermittent every 24 hours  caspofungin IVPB      chlorhexidine 0.12% Liquid 15 milliLiter(s) Oral Mucosa every 12 hours  chlorhexidine 2% Cloths 1 Application(s) Topical daily  dextrose 5%. 1000 milliLiter(s) (100 mL/Hr) IV Continuous <Continuous>  dextrose 50% Injectable 25 Gram(s) IV Push once  ertapenem  IVPB 1000 milliGRAM(s) IV Intermittent every 24 hours  glucagon  Injectable 1 milliGRAM(s) IntraMuscular once  heparin   Injectable 5000 Unit(s) SubCutaneous every 8 hours  HYDROmorphone  Injectable 2 milliGRAM(s) IV Push every 4 hours  insulin lispro (ADMELOG) corrective regimen sliding scale   SubCutaneous every 6 hours  lacosamide Solution 250 milliGRAM(s) Oral two times a day  loperamide Liquid 2 milliGRAM(s) Oral every 12 hours  metoprolol tartrate Injectable 2.5 milliGRAM(s) IV Push every 6 hours  multivitamin/minerals/iron Oral Solution (CENTRUM) 15 milliLiter(s) Oral daily  pantoprazole  Injectable 40 milliGRAM(s) IV Push daily  sodium chloride 3%  Inhalation 4 milliLiter(s) Inhalation every 6 hours    MEDICATIONS  (PRN):  dextrose Oral Gel 15 Gram(s) Oral once PRN Blood Glucose LESS THAN 70 milliGRAM(s)/deciliter  HYDROmorphone  Injectable 1 milliGRAM(s) IV Push every 4 hours PRN breakthrough pain  sodium chloride 0.9% lock flush 10 milliLiter(s) IV Push every 1 hour PRN Pre/post blood products, medications, blood draw, and to maintain line patency      Vital Signs Last 24 Hrs  T(C): 36.9 (20 Jun 2023 09:00), Max: 38 (19 Jun 2023 18:05)  T(F): 98.4 (20 Jun 2023 09:00), Max: 100.4 (19 Jun 2023 18:05)  HR: 108 (20 Jun 2023 10:00) (98 - 112)  BP: 138/75 (20 Jun 2023 10:00) (108/68 - 151/79)  BP(mean): 100 (20 Jun 2023 10:00) (83 - 104)  RR: 25 (20 Jun 2023 10:00) (12 - 60)  SpO2: 100% (20 Jun 2023 10:00) (94% - 100%)    Parameters below as of 20 Jun 2023 10:00  Patient On (Oxygen Delivery Method): tracheostomy collar  O2 Flow (L/min): 10  O2 Concentration (%): 40    Physical Exam:  GENERAL: NAD, resting comfortably in bed, awakens to voice and stimuli, hands flicker to command  HEENT: NCAT, MMM, s/p trach w copious thick secretions, cuff deflated  C/V: Normal rate, normal peripheral perfusion, systolic murmur  PULM: Nonlabored breathing, no respiratory distress, decreased sound bibasilar  ABD: Soft, ND, TTP but not peritonitic, drains SS green/brown tinge  EXTREM: WWP, dependent and extremities w 2+ edema, SCDs in place  NEURO: No focal deficits    I&O's Summary    19 Jun 2023 07:01  -  20 Jun 2023 07:00  --------------------------------------------------------  IN: 2790 mL / OUT: 3270 mL / NET: -480 mL    20 Jun 2023 07:01  -  20 Jun 2023 10:58  --------------------------------------------------------  IN: 310 mL / OUT: 295 mL / NET: 15 mL        LABS:                        6.8    19.35 )-----------( 745      ( 20 Jun 2023 05:30 )             21.6     06-20    145  |  108  |  26<H>  ----------------------------<  103<H>  3.6   |  30  |  0.63    Ca    7.7<L>      20 Jun 2023 05:30  Phos  3.2     06-20  Mg     2.0     06-20      PT/INR - ( 19 Jun 2023 05:37 )   PT: 15.3 sec;   INR: 1.28          PTT - ( 19 Jun 2023 05:37 )  PTT:36.4 sec    CAPILLARY BLOOD GLUCOSE      POCT Blood Glucose.: 106 mg/dL (20 Jun 2023 05:22)  POCT Blood Glucose.: 95 mg/dL (19 Jun 2023 23:38)  POCT Blood Glucose.: 153 mg/dL (19 Jun 2023 19:04)  POCT Blood Glucose.: 110 mg/dL (19 Jun 2023 11:05)        RADIOLOGY & ADDITIONAL STUDIES:

## 2023-06-20 NOTE — PROGRESS NOTE ADULT - ASSESSMENT
53yMale w/ PMHx of HTN, type A aortic dissection s/p Dacron grafts, AV resuspension in 2013, CAD s/p CABG x 1 SVG to RCA (5/2013 with Dr. Medina), seizure disorder, recent admission 3/20-4/14 for replacement of transverse aortic arch, second stage TEVAR and aorto-axillary bypass, AV replacement (bio 23mm), and CABG x 1 (SVG-RCA). Pt found to have endo leak and taken to OR for TEVAR revision on 5/5, Post op course c/b Klebsiella bacteremia (5/15), resp failure requiring intubation on 5/18, Mucus plugging s/p Bronch 5/19 and PEA arrest. Post operatively pt also found to have hemorrhagic pancreatitis with venu-pancreatic abscess possibly 2* to Depakote therefore s/p IR aspiration of peripancreatic fluid collection and paracentesis on 5/22, IR venu-pancreatic abscess drainage and placement of drainage catheter on 5/24. Pt then transferred from CTICU to SICU for further mgmt of hemorrhagic pancreatitis on 5/25. s/p IR placement of 2 new drainage catheters and catheter exchange on 5/26. LUQ drainage 5/30. OR 5/31 exlap washout & replacement of 3 new JPs and abthera vac with open abdomen. RTOR 6/1, no bleeding, evac of old blood, placement of feeding J-tube. failed extubation s/p trach 6/5.     PLAN  - Follow-up CBC (6.8 Hgb) post transfusion  - Follow-up stool cultures  - Follow-up sputum culture speciation and await ID recommendations  - Maintain active Type and Screen  - Continue tube feeds  - Monitor drain outputs   - Remainder of care per ICU  - Please page Surgery Team 1 at [phone] with any questions and/or clinical changes

## 2023-06-21 LAB
ANION GAP SERPL CALC-SCNC: 9 MMOL/L — SIGNIFICANT CHANGE UP (ref 5–17)
BUN SERPL-MCNC: 26 MG/DL — HIGH (ref 7–23)
CALCIUM SERPL-MCNC: 8.2 MG/DL — LOW (ref 8.4–10.5)
CHLORIDE SERPL-SCNC: 106 MMOL/L — SIGNIFICANT CHANGE UP (ref 96–108)
CO2 SERPL-SCNC: 28 MMOL/L — SIGNIFICANT CHANGE UP (ref 22–31)
CREAT SERPL-MCNC: 0.54 MG/DL — SIGNIFICANT CHANGE UP (ref 0.5–1.3)
EGFR: 118 ML/MIN/1.73M2 — SIGNIFICANT CHANGE UP
GLUCOSE BLDC GLUCOMTR-MCNC: 124 MG/DL — HIGH (ref 70–99)
GLUCOSE BLDC GLUCOMTR-MCNC: 153 MG/DL — HIGH (ref 70–99)
GLUCOSE BLDC GLUCOMTR-MCNC: 88 MG/DL — SIGNIFICANT CHANGE UP (ref 70–99)
GLUCOSE SERPL-MCNC: 123 MG/DL — HIGH (ref 70–99)
HCT VFR BLD CALC: 25.2 % — LOW (ref 39–50)
HGB BLD-MCNC: 8.2 G/DL — LOW (ref 13–17)
MAGNESIUM SERPL-MCNC: 1.9 MG/DL — SIGNIFICANT CHANGE UP (ref 1.6–2.6)
MCHC RBC-ENTMCNC: 29.8 PG — SIGNIFICANT CHANGE UP (ref 27–34)
MCHC RBC-ENTMCNC: 32.5 GM/DL — SIGNIFICANT CHANGE UP (ref 32–36)
MCV RBC AUTO: 91.6 FL — SIGNIFICANT CHANGE UP (ref 80–100)
NRBC # BLD: 0 /100 WBCS — SIGNIFICANT CHANGE UP (ref 0–0)
PHOSPHATE SERPL-MCNC: 3.6 MG/DL — SIGNIFICANT CHANGE UP (ref 2.5–4.5)
PLATELET # BLD AUTO: 736 K/UL — HIGH (ref 150–400)
POTASSIUM SERPL-MCNC: 3.1 MMOL/L — LOW (ref 3.5–5.3)
POTASSIUM SERPL-SCNC: 3.1 MMOL/L — LOW (ref 3.5–5.3)
RBC # BLD: 2.75 M/UL — LOW (ref 4.2–5.8)
RBC # FLD: 17.1 % — HIGH (ref 10.3–14.5)
SODIUM SERPL-SCNC: 143 MMOL/L — SIGNIFICANT CHANGE UP (ref 135–145)
VANCOMYCIN FLD-MCNC: 16.2 UG/ML — SIGNIFICANT CHANGE UP
WBC # BLD: 17.67 K/UL — HIGH (ref 3.8–10.5)
WBC # FLD AUTO: 17.67 K/UL — HIGH (ref 3.8–10.5)

## 2023-06-21 PROCEDURE — 99233 SBSQ HOSP IP/OBS HIGH 50: CPT | Mod: GC

## 2023-06-21 PROCEDURE — 99232 SBSQ HOSP IP/OBS MODERATE 35: CPT

## 2023-06-21 RX ORDER — POTASSIUM CHLORIDE 20 MEQ
30 PACKET (EA) ORAL ONCE
Refills: 0 | Status: COMPLETED | OUTPATIENT
Start: 2023-06-21 | End: 2023-06-21

## 2023-06-21 RX ORDER — VANCOMYCIN HCL 1 G
1500 VIAL (EA) INTRAVENOUS ONCE
Refills: 0 | Status: COMPLETED | OUTPATIENT
Start: 2023-06-21 | End: 2023-06-21

## 2023-06-21 RX ORDER — POTASSIUM CHLORIDE 20 MEQ
10 PACKET (EA) ORAL
Refills: 0 | Status: COMPLETED | OUTPATIENT
Start: 2023-06-21 | End: 2023-06-21

## 2023-06-21 RX ORDER — LOPERAMIDE HCL 2 MG
2 TABLET ORAL EVERY 8 HOURS
Refills: 0 | Status: DISCONTINUED | OUTPATIENT
Start: 2023-06-21 | End: 2023-07-05

## 2023-06-21 RX ORDER — OXYCODONE HYDROCHLORIDE 5 MG/1
20 TABLET ORAL EVERY 6 HOURS
Refills: 0 | Status: DISCONTINUED | OUTPATIENT
Start: 2023-06-21 | End: 2023-06-23

## 2023-06-21 RX ORDER — MAGNESIUM SULFATE 500 MG/ML
1 VIAL (ML) INJECTION ONCE
Refills: 0 | Status: COMPLETED | OUTPATIENT
Start: 2023-06-21 | End: 2023-06-21

## 2023-06-21 RX ADMIN — Medication 2: at 11:39

## 2023-06-21 RX ADMIN — Medication 30 MILLIEQUIVALENT(S): at 11:39

## 2023-06-21 RX ADMIN — Medication 975 MILLIGRAM(S): at 06:17

## 2023-06-21 RX ADMIN — Medication 3 MILLILITER(S): at 06:08

## 2023-06-21 RX ADMIN — Medication 975 MILLIGRAM(S): at 11:30

## 2023-06-21 RX ADMIN — HYDROMORPHONE HYDROCHLORIDE 2 MILLIGRAM(S): 2 INJECTION INTRAMUSCULAR; INTRAVENOUS; SUBCUTANEOUS at 02:44

## 2023-06-21 RX ADMIN — Medication 975 MILLIGRAM(S): at 18:11

## 2023-06-21 RX ADMIN — Medication 81 MILLIGRAM(S): at 11:41

## 2023-06-21 RX ADMIN — Medication 2 MILLIGRAM(S): at 05:17

## 2023-06-21 RX ADMIN — OXYCODONE HYDROCHLORIDE 20 MILLIGRAM(S): 5 TABLET ORAL at 11:14

## 2023-06-21 RX ADMIN — Medication 2.5 MILLIGRAM(S): at 23:40

## 2023-06-21 RX ADMIN — SODIUM CHLORIDE 4 MILLILITER(S): 9 INJECTION INTRAMUSCULAR; INTRAVENOUS; SUBCUTANEOUS at 12:35

## 2023-06-21 RX ADMIN — Medication 3 MILLILITER(S): at 17:43

## 2023-06-21 RX ADMIN — Medication 100 MILLIEQUIVALENT(S): at 09:43

## 2023-06-21 RX ADMIN — OXYCODONE HYDROCHLORIDE 20 MILLIGRAM(S): 5 TABLET ORAL at 18:11

## 2023-06-21 RX ADMIN — SODIUM CHLORIDE 4 MILLILITER(S): 9 INJECTION INTRAMUSCULAR; INTRAVENOUS; SUBCUTANEOUS at 17:44

## 2023-06-21 RX ADMIN — Medication 3 MILLILITER(S): at 23:25

## 2023-06-21 RX ADMIN — HEPARIN SODIUM 5000 UNIT(S): 5000 INJECTION INTRAVENOUS; SUBCUTANEOUS at 05:15

## 2023-06-21 RX ADMIN — Medication 100 MILLIEQUIVALENT(S): at 11:13

## 2023-06-21 RX ADMIN — Medication 975 MILLIGRAM(S): at 23:40

## 2023-06-21 RX ADMIN — HYDROMORPHONE HYDROCHLORIDE 2 MILLIGRAM(S): 2 INJECTION INTRAMUSCULAR; INTRAVENOUS; SUBCUTANEOUS at 02:21

## 2023-06-21 RX ADMIN — Medication 1 DROP(S): at 17:48

## 2023-06-21 RX ADMIN — OXYCODONE HYDROCHLORIDE 20 MILLIGRAM(S): 5 TABLET ORAL at 17:46

## 2023-06-21 RX ADMIN — ERTAPENEM SODIUM 120 MILLIGRAM(S): 1 INJECTION, POWDER, LYOPHILIZED, FOR SOLUTION INTRAMUSCULAR; INTRAVENOUS at 22:15

## 2023-06-21 RX ADMIN — CHLORHEXIDINE GLUCONATE 1 APPLICATION(S): 213 SOLUTION TOPICAL at 05:16

## 2023-06-21 RX ADMIN — HYDROMORPHONE HYDROCHLORIDE 2 MILLIGRAM(S): 2 INJECTION INTRAMUSCULAR; INTRAVENOUS; SUBCUTANEOUS at 05:31

## 2023-06-21 RX ADMIN — Medication 300 MILLIGRAM(S): at 09:43

## 2023-06-21 RX ADMIN — Medication 2.5 MILLIGRAM(S): at 07:07

## 2023-06-21 RX ADMIN — Medication 3 MILLILITER(S): at 12:33

## 2023-06-21 RX ADMIN — Medication 2.5 MILLIGRAM(S): at 11:13

## 2023-06-21 RX ADMIN — OXYCODONE HYDROCHLORIDE 20 MILLIGRAM(S): 5 TABLET ORAL at 11:36

## 2023-06-21 RX ADMIN — Medication 975 MILLIGRAM(S): at 00:46

## 2023-06-21 RX ADMIN — Medication 15 MILLILITER(S): at 11:36

## 2023-06-21 RX ADMIN — SODIUM CHLORIDE 4 MILLILITER(S): 9 INJECTION INTRAMUSCULAR; INTRAVENOUS; SUBCUTANEOUS at 06:08

## 2023-06-21 RX ADMIN — Medication 100 GRAM(S): at 08:17

## 2023-06-21 RX ADMIN — Medication 975 MILLIGRAM(S): at 05:17

## 2023-06-21 RX ADMIN — SODIUM CHLORIDE 4 MILLILITER(S): 9 INJECTION INTRAMUSCULAR; INTRAVENOUS; SUBCUTANEOUS at 23:25

## 2023-06-21 RX ADMIN — Medication 2.5 MILLIGRAM(S): at 17:44

## 2023-06-21 RX ADMIN — LACOSAMIDE 250 MILLIGRAM(S): 50 TABLET ORAL at 05:13

## 2023-06-21 RX ADMIN — Medication 2 MILLIGRAM(S): at 22:15

## 2023-06-21 RX ADMIN — Medication 100 MILLIEQUIVALENT(S): at 08:18

## 2023-06-21 RX ADMIN — Medication 1 DROP(S): at 07:09

## 2023-06-21 RX ADMIN — HYDROMORPHONE HYDROCHLORIDE 2 MILLIGRAM(S): 2 INJECTION INTRAMUSCULAR; INTRAVENOUS; SUBCUTANEOUS at 05:16

## 2023-06-21 RX ADMIN — HEPARIN SODIUM 5000 UNIT(S): 5000 INJECTION INTRAVENOUS; SUBCUTANEOUS at 14:40

## 2023-06-21 RX ADMIN — LACOSAMIDE 250 MILLIGRAM(S): 50 TABLET ORAL at 17:47

## 2023-06-21 RX ADMIN — Medication 975 MILLIGRAM(S): at 11:15

## 2023-06-21 RX ADMIN — OXYCODONE HYDROCHLORIDE 20 MILLIGRAM(S): 5 TABLET ORAL at 23:40

## 2023-06-21 RX ADMIN — CHLORHEXIDINE GLUCONATE 15 MILLILITER(S): 213 SOLUTION TOPICAL at 17:45

## 2023-06-21 RX ADMIN — Medication 2 MILLIGRAM(S): at 14:41

## 2023-06-21 RX ADMIN — PANTOPRAZOLE SODIUM 40 MILLIGRAM(S): 20 TABLET, DELAYED RELEASE ORAL at 11:13

## 2023-06-21 RX ADMIN — Medication 975 MILLIGRAM(S): at 17:46

## 2023-06-21 RX ADMIN — CHLORHEXIDINE GLUCONATE 15 MILLILITER(S): 213 SOLUTION TOPICAL at 05:17

## 2023-06-21 RX ADMIN — HEPARIN SODIUM 5000 UNIT(S): 5000 INJECTION INTRAVENOUS; SUBCUTANEOUS at 22:15

## 2023-06-21 NOTE — PROGRESS NOTE ADULT - SUBJECTIVE AND OBJECTIVE BOX
Interval Events:    No events overnight   Patient seen and examined at bedside.      Allergies    No Known Allergies    Intolerances        Vital Signs Last 24 Hrs  T(C): 36.8 (21 Jun 2023 00:10), Max: 37.2 (20 Jun 2023 12:00)  T(F): 98.2 (21 Jun 2023 00:10), Max: 99 (20 Jun 2023 12:00)  HR: 102 (21 Jun 2023 01:00) (90 - 112)  BP: 135/77 (21 Jun 2023 01:00) (115/71 - 154/73)  BP(mean): 111 (21 Jun 2023 01:00) (89 - 111)  RR: 20 (21 Jun 2023 01:00) (13 - 25)  SpO2: 99% (21 Jun 2023 01:00) (94% - 100%)    Parameters below as of 21 Jun 2023 01:00  Patient On (Oxygen Delivery Method): tracheostomy collar  O2 Flow (L/min): 10  O2 Concentration (%): 40    06-19 @ 07:01  -  06-20 @ 07:00  --------------------------------------------------------  IN: 2790 mL / OUT: 3270 mL / NET: -480 mL    06-20 @ 07:01  -  06-21 @ 02:07  --------------------------------------------------------  IN: 2375 mL / OUT: 2220 mL / NET: 155 mL      06-19 @ 07:01  -  06-20 @ 07:00  --------------------------------------------------------  IN: 2790 mL / OUT: 3270 mL / NET: -480 mL    06-20 @ 07:01  -  06-21 @ 02:07  --------------------------------------------------------  IN: 2375 mL / OUT: 2220 mL / NET: 155 mL        Physical Exam:     Physical Exam:  GENERAL: NAD, resting comfortably in bed, awakens to voice and stimuli, hands flicker to command  NEURO: Responding appropriately to complex commands Generalized weakness.   HEENT: NCAT, MMM, s/p trach cuff deflated  C/V: Normal rate, normal peripheral perfusion, systolic murmur  PULM: Nonlabored breathing, no respiratory distress, decreased sound bibasilar  ABD: Soft, ND, TTP but not peritonitic, drains SS green/brown tinge  EXTREM: WWP, dependent and extremities w 2+ edema, SCDs in place  Vasc: + DP b/l   Skin: no rashes noted  MSK: No joint swelling  Psych: No signs of anxiety or depression      LABS:      CBC Full  -  ( 20 Jun 2023 05:30 )  WBC Count : 19.35 K/uL  RBC Count : 2.32 M/uL  Hemoglobin : 6.8 g/dL  Hematocrit : 21.6 %  Platelet Count - Automated : 745 K/uL  Mean Cell Volume : 93.1 fl  Mean Cell Hemoglobin : 29.3 pg  Mean Cell Hemoglobin Concentration : 31.5 gm/dL  Auto Neutrophil # : x  Auto Lymphocyte # : x  Auto Monocyte # : x  Auto Eosinophil # : x  Auto Basophil # : x  Auto Neutrophil % : x  Auto Lymphocyte % : x  Auto Monocyte % : x  Auto Eosinophil % : x  Auto Basophil % : x    06-20    145  |  108  |  26<H>  ----------------------------<  103<H>  3.6   |  30  |  0.63    Ca    7.7<L>      20 Jun 2023 05:30  Phos  3.2     06-20  Mg     2.0     06-20      PT/INR - ( 19 Jun 2023 05:37 )   PT: 15.3 sec;   INR: 1.28          PTT - ( 19 Jun 2023 05:37 )  PTT:36.4 sec                RADIOLOGY & ADDITIONAL STUDIES (The following images were personally reviewed):          A/p: 53yMale w/ PMHx of HTN, type A aortic dissection s/p Dacron grafts, AV resuspension in 2013, CAD s/p CABG x 1 SVG to RCA (5/2013 with Dr. Medina), seizure disorder, recent admission 3/20-4/14 for replacement of transverse aortic arch, second stage TEVAR and aorto-axillary bypass, AV replacement (bio 23mm), and CABG x 1 (SVG-RCA). Pt found to have endo leak and taken to OR for TEVAR revision on 5/5, Post op course c/b Klebsiella bacteremia (5/15), resp failure requiring intubation on 5/18, Mucus plugging s/p Bronch 5/19 and PEA arrest. Post operatively pt also found to have hemorrhagic pancreatitis with venu-pancreatic abscess possibly 2* to Depakote therefore s/p IR aspiration of peripancreatic fluid collection and paracentesis on 5/22, IR venu-pancreatic abscess drainage and placement of drainage catheter on 5/24. Pt then transferred from CTICU to SICU for further mgmt of hemorrhagic pancreatitis on 5/25. s/p IR placement of 2 new drainage catheters and catheter exchange on 5/26. LUQ drainage 5/30. OR 5/31 exlap washout & replacement of 3 new JPs and abthera vac with open abdomen. RTOR 6/1, no bleeding, evac of old blood, placement of feeding J-tube. failed extubation s/p trach 6/5 with pneumonia.    NEURO: Pain control: switch Dilaudid 2 q4, w/ additional PRN dilaudid, d/c'd fent gtt, Precedex gtt, d/c'd ketamine (6/14). Hx seizures: epilepsy recs appreciated, Cont vimpat. Depakote weaned off due to concerns for drug-related hemorrhagic pancreatitis. Repeat EEG (ordered)  HEENT: Artificial tears, Torticollis - PT/OT following.   CV: Sepsis without shock, hold off on diruesis as SBP 100s. MAP > 65. s/p PEA arrest on 5/24 night. TTE (6/14) LVEF 75%, LVH, biatrial enlargement and elevated PA pressure (elevated from previous), mild to mod MR/TR . C/w ASA 81mg. Metoprolol 2.5q6. Diuresis (Lasix 80BID, chlorthiazide)  PULM: ARDS resolved - off NO, s/p multiple Mucus plug/ Lung collapse s/p bronch x3 (most recently on 5/26) most likely from outward obstruction: Cont rotating pt around the clock. Cont Duonebs, 3% saline. failed extubation 6/3: s/p Trach 6/5: Tolerating 40% Trach Collar (backup CPAP 8/PS 5).  GI/FEN: TF Vital 1.0 at 70cc via feeding J-tube with imodium 2mg BID, Protonix BID, Hemorrhagic pancreatitis: s/p multiple drain placements and paracentisis by IR team. Cdiff negative (6/19)  : AKF on HD s/p CVVHD, now w/ renal recovery, off of aquaphoresis for fluid overload. condom catheter.  HEME: Acute blood loss anemia requiring multiple transfusions  ENDO: mISS  ID: AFebrile overnight was restarted on Ertapenem and Caspofungin. Recent CT A/P without significant intra-abdominal pathology, favoring respiratory etiology given secretions and Sputum growing GPR and yeast on 7/17. Cont Caspo( 6/18-), Erta( 6/18- ) , vanc by level (6/18--)  PRIOR ABX: Ertapenem (6/13-6/16), meropenem (6/9-6/13), vanco x 1 (6/9), caspofungin (6/9-6/12), previous Kleb bacteremia from 5/15 & 5/17, subsequent bld cx negative, PNA w/ bronch cx kleb, enterobacter (zosyn, erta resistant), E faecalis, strep angionosus from 5/19, pancreatic abscess cx pan-sensitive kleb 5/22. completed Ceftriaxone( 6/5- 6/15) Chloe (5/21-6/5),  Caspo (5/23-5/30), Ampicillin (5/21- 5/27).   PPX: SCDs, SQH  LINES: PIVs // L rad kalpana (5/31-6/15), 5Fr PIV in LUE (6/13-15), Lsubclav HD Cath (5/31-6/12), RIJ TLC (5/22-5/27), RIJ HD cath (5/22-31), R Ax kalpana(5/12-5/31), LIJ TLC (5/23-6/1), RIJ TLC (6/1-13)   WOUNDS/DRAINS: Right OR CODIE, 2 left OR CODIE, left IR hematoma drain (bilious), left IR pancreatic drain  PT: PT/OT ordered 6/5. Out of bed to stretcher chair  Dispo: SICU OVERNIGHT: No acute events.     SUBJECTIVE TODAY: Patient seen and examined at bedside. OOB to stretcher chair, appears somnolent. Denies pain. No acute complaints.     Allergies    No Known Allergies    Intolerances    MEDICATIONS:  MEDICATIONS (STANDING):  acetaminophen   Oral Liquid .. 975 milliGRAM(s) Enteral Tube every 6 hours  albuterol/ipratropium for Nebulization 3 milliLiter(s) Nebulizer every 6 hours  artificial  tears Solution 1 Drop(s) Both EYES two times a day  aspirin  chewable 81 milliGRAM(s) Enteral Tube every 24 hours  chlorhexidine 0.12% Liquid 15 milliLiter(s) Oral Mucosa every 12 hours  chlorhexidine 2% Cloths 1 Application(s) Topical daily  dextrose 5%. 1000 milliLiter(s) (100 mL/Hr) IV Continuous <Continuous>  dextrose 50% Injectable 25 Gram(s) IV Push once  ertapenem  IVPB 1000 milliGRAM(s) IV Intermittent every 24 hours  glucagon  Injectable 1 milliGRAM(s) IntraMuscular once  heparin   Injectable 5000 Unit(s) SubCutaneous every 8 hours  insulin lispro (ADMELOG) corrective regimen sliding scale   SubCutaneous every 6 hours  lacosamide Solution 250 milliGRAM(s) Oral two times a day  loperamide Liquid 2 milliGRAM(s) Enteral Tube every 8 hours  metoprolol tartrate Injectable 2.5 milliGRAM(s) IV Push every 6 hours  multivitamin/minerals/iron Oral Solution (CENTRUM) 15 milliLiter(s) Oral daily  oxyCODONE    Solution 20 milliGRAM(s) Enteral Tube every 6 hours  pantoprazole  Injectable 40 milliGRAM(s) IV Push daily  sodium chloride 3%  Inhalation 4 milliLiter(s) Inhalation every 6 hours    MEDICATIONS (PRN):  dextrose Oral Gel 15 Gram(s) Oral once PRN Blood Glucose LESS THAN 70 milliGRAM(s)/deciliter  HYDROmorphone  Injectable 1 milliGRAM(s) IV Push every 4 hours PRN breakthrough pain  sodium chloride 0.9% lock flush 10 milliLiter(s) IV Push every 1 hour PRN Pre/post blood products, medications, blood draw, and to maintain line patency    VITALS:  ICU Vital Signs Last 24 Hrs  T(C): 37.1 (21 Jun 2023 12:00), Max: 37.1 (21 Jun 2023 09:00)  T(F): 98.7 (21 Jun 2023 12:00), Max: 98.7 (21 Jun 2023 09:00)  HR: 88 (21 Jun 2023 13:00) (78 - 107)  BP: 129/72 (21 Jun 2023 13:00) (122/71 - 160/82)  BP(mean): 93 (21 Jun 2023 13:00) (91 - 119)  ABP: --  ABP(mean): --  RR: 13 (21 Jun 2023 13:00) (13 - 23)  SpO2: 100% (21 Jun 2023 13:00) (95% - 100%)    O2 Parameters below as of 21 Jun 2023 13:00  Patient On (Oxygen Delivery Method): tracheostomy collar  O2 Flow (L/min): 10  O2 Concentration (%): 40    I&O's    20 Jun 2023 07:01  -  21 Jun 2023 07:00  --------------------------------------------------------  IN:    Enteral Tube Flush: 665 mL    IV PiggyBack: 100 mL    IV PiggyBack: 50 mL    Other (mL): 50 mL    PRBCs (Packed Red Blood Cells): 300 mL    Vital1.0: 1680 mL  Total IN: 2845 mL    OUT:    Bulb (mL): 15 mL    Bulb (mL): 40 mL    Bulb (mL): 25 mL    Drain (mL): 20 mL    Drain (mL): 25 mL    Rectal Tube (mL): 1100 mL    Voided (mL): 1390 mL  Total OUT: 2615 mL    Total NET: 230 mL      21 Jun 2023 07:01  -  21 Jun 2023 14:01  --------------------------------------------------------  IN:    Enteral Tube Flush: 40 mL    IV PiggyBack: 500 mL    IV PiggyBack: 100 mL    IV PiggyBack: 300 mL    Other (mL): 120 mL    Vital1.0: 350 mL  Total IN: 1410 mL    OUT:  Total OUT: 0 mL    Total NET: 1410 mL    PHYSICAL EXAM:   GENERAL: In no acute distress. Resting comfortably in stretcher chair. Intermittently awakens to voice and stimuli, hands flicker to command.  NEURO: Responding appropriately to complex commands. Generalized weakness.   HEENT: Mucous membranes moist. s/p trach cuff deflated.  C/V: Normal rate, normal peripheral perfusion, systolic murmur.  PULM: Nonlabored breathing, no respiratory distress, decreased sound bibasilar.  ABD: Soft, nondistended, tenderness to palpation but not peritonitic. Drains SS with green/brown tinge.  EXTREM: Warm, well perfused. Dependent and extremities with 2+ edema, SCDs in place.  Vasc: 1+ DP b/l.   Skin: No rashes noted.  MSK: No joint swelling.  Psych: No signs of anxiety or depression.    LABS:                8.2    17.67 )-----------( 736      ( 21 Jun 2023 05:30 )             25.2     06-21    143  |  106  |  26<H>  ----------------------------<  123<H>  3.1<L>   |  28  |  0.54    Ca    8.2<L>      21 Jun 2023 05:30  Phos  3.6     06-21  Mg     1.9     06-21    CAPILLARY BLOOD GLUCOSE  POCT Blood Glucose.: 153 mg/dL (21 Jun 2023 11:25)  POCT Blood Glucose.: 124 mg/dL (21 Jun 2023 06:53)  POCT Blood Glucose.: 106 mg/dL (20 Jun 2023 17:50)  POCT Blood Glucose.: 81 mg/dL (20 Jun 2023 16:30)    Urinalysis Basic - ( 21 Jun 2023 05:30 )  Color: x / Appearance: x / SG: x / pH: x  Gluc: 123 mg/dL / Ketone: x  / Bili: x / Urobili: x   Blood: x / Protein: x / Nitrite: x   Leuk Esterase: x / RBC: x / WBC x   Sq Epi: x / Non Sq Epi: x / Bacteria: x    Culture - Sputum (collected 17 Jun 2023 21:51)  Source: Trach Asp Tracheal Aspirate  Gram Stain (17 Jun 2023 22:37):    Rare epithelial cells    Few WBC's    Few Yeast    Rare Gram Positive Rods  Final Report (19 Jun 2023 09:50):    Normal Respiratory Loni present    Culture - Blood (collected 17 Jun 2023 18:41)  Source: .Blood Blood  Preliminary Report (20 Jun 2023 20:00):    No growth at 3 days.    Culture - Blood (collected 17 Jun 2023 18:41)  Source: .Blood Blood  Preliminary Report (20 Jun 2023 20:00):    No growth at 3 days.      RADIOLOGY & ADDITIONAL STUDIES (The following images were personally reviewed):

## 2023-06-21 NOTE — PROGRESS NOTE ADULT - ASSESSMENT
53yMale w/ PMHx of HTN, type A aortic dissection s/p Dacron grafts, AV resuspension in 2013, CAD s/p CABG x 1 SVG to RCA (5/2013 with Dr. Medina), seizure disorder, recent admission 3/20-4/14 for replacement of transverse aortic arch, second stage TEVAR and aorto-axillary bypass, AV replacement (bio 23mm), and CABG x 1 (SVG-RCA). Pt found to have endo leak and taken to OR for TEVAR revision on 5/5, Post op course c/b Klebsiella bacteremia (5/15), resp failure requiring intubation on 5/18, Mucus plugging s/p Bronch 5/19 and PEA arrest. Post operatively pt also found to have hemorrhagic pancreatitis with venu-pancreatic abscess possibly 2* to Depakote therefore s/p IR aspiration of peripancreatic fluid collection and paracentesis on 5/22, IR venu-pancreatic abscess drainage and placement of drainage catheter on 5/24. Pt then transferred from CTICU to SICU for further mgmt of hemorrhagic pancreatitis on 5/25. s/p IR placement of 2 new drainage catheters and catheter exchange on 5/26. LUQ drainage 5/30. OR 5/31 exlap washout & replacement of 3 new JPs and abthera vac with open abdomen. RTOR 6/1, no bleeding, evac of old blood, placement of feeding J-tube. failed extubation s/p trach 6/5.     Plan:    - Follow-up sputum culture speciation   - ID recs  - Maintain active Type and Screen  - Continue tube feeds  - Monitor drain outputs   - Remainder of care per ICU  - Please page Surgery Team 1 at [phone] with any questions and/or clinical changes

## 2023-06-21 NOTE — PROGRESS NOTE ADULT - ASSESSMENT
53M h/o seizure d/o, aortic dissection s/p AVR, aortic graft revision 3/20/23, 2nd stage TEVAR 5/5/23, course c/b peripancreatic/RP hemorrhage/hematoma formation s/p multiple washouts--last ex-lap 6/1 (CT with near complete interval resolution of venu-pancreatic collection). s/p trach. Course c/b VAP 2/2 Enterobacter and Klebsiella. Fever, increased leukocytosis despite targeted therapy with ertapenem; CT with bibasilar consolidations ?aspiration pneumonia.  - f/u bcx 6/17--ngtd  - f/u sputum cx 6/17--GS yeast, GPR--cx normal shraddha to date  - continue ertapenem 1g IV q24h to complete 14d course thru 6/23  - redose vancomycin 1.5g IV X 1 AM of 6/22 followed by observation off vancomycin    Please reconsult with ?

## 2023-06-21 NOTE — PROGRESS NOTE ADULT - ASSESSMENT
53 year old male w/ PMHx of HTN, type A aortic dissection s/p Dacron grafts, AV resuspension in 2013, CAD s/p CABG x 1 SVG to RCA (5/2013 with Dr. Medina), seizure disorder, recent admission 3/20-4/14 for replacement of transverse aortic arch, second stage TEVAR and aorto-axillary bypass, AV replacement (bio 23mm), and CABG x 1 (SVG-RCA). Pt found to have endo leak and taken to OR for TEVAR revision on 5/5, Post op course c/b Klebsiella bacteremia (5/15), resp failure requiring intubation on 5/18, Mucus plugging s/p Bronch 5/19 and PEA arrest. Post operatively pt also found to have hemorrhagic pancreatitis with venu-pancreatic abscess possibly 2* to Depakote therefore s/p IR aspiration of peripancreatic fluid collection and paracentesis on 5/22, IR venu-pancreatic abscess drainage and placement of drainage catheter on 5/24. Pt then transferred from CTICU to SICU for further mgmt of hemorrhagic pancreatitis on 5/25. s/p IR placement of 2 new drainage catheters and catheter exchange on 5/26. LUQ drainage 5/30. OR 5/31 exlap washout & replacement of 3 new JPs and abthera vac with open abdomen. RTOR 6/1, no bleeding, evac of old blood, placement of feeding J-tube. failed extubation s/p trach 6/5 with pneumonia.    NEURO: Pain control: Pain regime transitioned back to liquid oxy - dose decre to 20q6 standing 2/2 somnolence, discontinue Dilaudid 2 q4, w/ additional PRN dilaudid, d/c'd fent gtt, Precedex gtt, d/c'd ketamine (6/14). Hx seizures: epilepsy recs appreciated, Cont vimpat. Depakote weaned off due to concerns for drug-related hemorrhagic pancreatitis. Repeat EEG (ordered)  HEENT: Artificial tears, Torticollis - PT/OT following.  CV: Sepsis without shock, hold off on diruesis as SBP 100s. MAP > 65. s/p PEA arrest on 5/24 night. TTE (6/14) LVEF 75%, LVH, biatrial enlargement and elevated PA pressure (elevated from previous), mild to mod MR/TR . C/w ASA 81mg. Metoprolol 2.5q6. Diuresis (Lasix 80BID, chlorthiazide).  PULM: ARDS resolved - off NO, s/p multiple Mucus plug/ Lung collapse s/p bronch x3 (most recently on 5/26) most likely from outward obstruction: Cont rotating pt around the clock. Cont Duonebs, 3% saline. failed extubation 6/3: s/p Trach 6/5: Tolerating 40% Trach Collar (backup CPAP 8/PS 5).  GI/FEN: Immodium frequency increased to q8. TF Vital 1.0 at 70cc via feeding J-tube with imodium 2mg BID, Protonix BID, Hemorrhagic pancreatitis: s/p multiple drain placements and paracentisis by IR team. Cdiff negative (6/19)  : AKF on HD s/p CVVHD, now w/ renal recovery, off of aquaphoresis for fluid overload. condom catheter.  HEME: Acute blood loss anemia requiring multiple transfusions  ENDO: mISS  ID: AFebrile overnight was restarted on Ertapenem and Caspofungin. Recent CT A/P without significant intra-abdominal pathology, favoring respiratory etiology given secretions and Sputum growing GPR and yeast on 7/17. Cont Caspo( 6/18-), Erta( 6/18- ) , vanc by level (6/18--)  PRIOR ABX: Ertapenem (6/13-6/16), meropenem (6/9-6/13), vanco x 1 (6/9), caspofungin (6/9-6/12), previous Kleb bacteremia from 5/15 & 5/17, subsequent bld cx negative, PNA w/ bronch cx kleb, enterobacter (zosyn, erta resistant), E faecalis, strep angionosus from 5/19, pancreatic abscess cx pan-sensitive kleb 5/22. completed Ceftriaxone( 6/5- 6/15) Chloe (5/21-6/5),  Caspo (5/23-5/30), Ampicillin (5/21- 5/27).   PPX: SCDs, SQH  LINES: PIVs // L rad kalpana (5/31-6/15), 5Fr PIV in LUE (6/13-15), Lsubclav HD Cath (5/31-6/12), RIJ TLC (5/22-5/27), RIJ HD cath (5/22-31), R Ax kalpana(5/12-5/31), LIJ TLC (5/23-6/1), RIJ TLC (6/1-13)   WOUNDS/DRAINS: Right OR CODIE, 2 left OR CODIE, left IR hematoma drain (bilious), left IR pancreatic drain  PT: PT/OT ordered 6/5. Out of bed to stretcher chair  Dispo: SICU    Immodium frequency increased to q8. Pain regime transitioned back to liquid oxy - dose decre to 20q6 standing 2/2 somnolence. Vanc trough 16.2 - Vanc given.

## 2023-06-21 NOTE — PROGRESS NOTE ADULT - SUBJECTIVE AND OBJECTIVE BOX
ON: No events.   6/20: PRBC for 6.8. Vnc Trough 29 - no more vanco, lasix 80 and chlorothiazide 500. Stop caspo per ID     SUBJECTIVE: Patient seen and examined bedside; OOB to chair, nodding head after asked if pain.    aspirin  chewable 81 milliGRAM(s) Enteral Tube every 24 hours  ertapenem  IVPB 1000 milliGRAM(s) IV Intermittent every 24 hours  heparin   Injectable 5000 Unit(s) SubCutaneous every 8 hours  metoprolol tartrate Injectable 2.5 milliGRAM(s) IV Push every 6 hours      Vital Signs Last 24 Hrs  T(C): 37.1 (21 Jun 2023 09:00), Max: 37.2 (20 Jun 2023 12:00)  T(F): 98.7 (21 Jun 2023 09:00), Max: 99 (20 Jun 2023 12:00)  HR: 82 (21 Jun 2023 09:00) (78 - 104)  BP: 122/71 (21 Jun 2023 09:00) (122/71 - 160/82)  BP(mean): 91 (21 Jun 2023 09:00) (91 - 114)  RR: 14 (21 Jun 2023 09:00) (13 - 23)  SpO2: 100% (21 Jun 2023 09:00) (95% - 100%)    Parameters below as of 21 Jun 2023 09:00  Patient On (Oxygen Delivery Method): tracheostomy collar  O2 Flow (L/min): 10  O2 Concentration (%): 40  I&O's Detail    20 Jun 2023 07:01  -  21 Jun 2023 07:00  --------------------------------------------------------  IN:    Enteral Tube Flush: 665 mL    IV PiggyBack: 100 mL    IV PiggyBack: 50 mL    Other (mL): 50 mL    PRBCs (Packed Red Blood Cells): 300 mL    Vital1.0: 1680 mL  Total IN: 2845 mL    OUT:    Bulb (mL): 15 mL    Bulb (mL): 40 mL    Bulb (mL): 25 mL    Drain (mL): 20 mL    Drain (mL): 25 mL    Rectal Tube (mL): 1100 mL    Voided (mL): 1390 mL  Total OUT: 2615 mL    Total NET: 230 mL      21 Jun 2023 07:01  -  21 Jun 2023 11:30  --------------------------------------------------------  IN:    IV PiggyBack: 200 mL    IV PiggyBack: 500 mL    IV PiggyBack: 100 mL    Vital1.0: 140 mL  Total IN: 940 mL    OUT:  Total OUT: 0 mL    Total NET: 940 mL      PE:    General: NAD, resting comfortably in bed  C/V: NSR, s1 s2  Pulm: On trach collar, nonlabored breathing, no respiratory distress  Abd: Soft, NTND; midline incision c/d/i; drains unchanged, flushed this morning  Extrem: [facility]        LABS:                        8.2    17.67 )-----------( 736      ( 21 Jun 2023 05:30 )             25.2     06-21    143  |  106  |  26<H>  ----------------------------<  123<H>  3.1<L>   |  28  |  0.54    Ca    8.2<L>      21 Jun 2023 05:30  Phos  3.6     06-21  Mg     1.9     06-21        Urinalysis Basic - ( 21 Jun 2023 05:30 )    Color: x / Appearance: x / SG: x / pH: x  Gluc: 123 mg/dL / Ketone: x  / Bili: x / Urobili: x   Blood: x / Protein: x / Nitrite: x   Leuk Esterase: x / RBC: x / WBC x   Sq Epi: x / Non Sq Epi: x / Bacteria: x        RADIOLOGY & ADDITIONAL STUDIES:

## 2023-06-21 NOTE — PROGRESS NOTE ADULT - SUBJECTIVE AND OBJECTIVE BOX
INTERVAL HPI/OVERNIGHT EVENTS: SABRINA.    CONSTITUTIONAL:  Negative fever or chills, feels well, good appetite  EYES:  Negative  blurry vision or double vision  CARDIOVASCULAR:  Negative for chest pain or palpitations  RESPIRATORY:  Negative for cough, wheezing, or SOB   GASTROINTESTINAL:  Negative for nausea, vomiting, diarrhea, constipation, or abdominal pain  GENITOURINARY:  Negative frequency, urgency or dysuria  NEUROLOGIC:  No headache, confusion, dizziness, lightheadedness      ANTIBIOTICS/RELEVANT:    MEDICATIONS  (STANDING):  acetaminophen   Oral Liquid .. 975 milliGRAM(s) Enteral Tube every 6 hours  albuterol/ipratropium for Nebulization 3 milliLiter(s) Nebulizer every 6 hours  artificial  tears Solution 1 Drop(s) Both EYES two times a day  aspirin  chewable 81 milliGRAM(s) Enteral Tube every 24 hours  chlorhexidine 0.12% Liquid 15 milliLiter(s) Oral Mucosa every 12 hours  chlorhexidine 2% Cloths 1 Application(s) Topical daily  dextrose 5%. 1000 milliLiter(s) (100 mL/Hr) IV Continuous <Continuous>  dextrose 50% Injectable 25 Gram(s) IV Push once  ertapenem  IVPB 1000 milliGRAM(s) IV Intermittent every 24 hours  glucagon  Injectable 1 milliGRAM(s) IntraMuscular once  heparin   Injectable 5000 Unit(s) SubCutaneous every 8 hours  insulin lispro (ADMELOG) corrective regimen sliding scale   SubCutaneous every 6 hours  lacosamide Solution 250 milliGRAM(s) Oral two times a day  loperamide Liquid 2 milliGRAM(s) Enteral Tube every 8 hours  metoprolol tartrate Injectable 2.5 milliGRAM(s) IV Push every 6 hours  multivitamin/minerals/iron Oral Solution (CENTRUM) 15 milliLiter(s) Oral daily  oxyCODONE    Solution 20 milliGRAM(s) Enteral Tube every 6 hours  pantoprazole  Injectable 40 milliGRAM(s) IV Push daily  sodium chloride 3%  Inhalation 4 milliLiter(s) Inhalation every 6 hours    MEDICATIONS  (PRN):  dextrose Oral Gel 15 Gram(s) Oral once PRN Blood Glucose LESS THAN 70 milliGRAM(s)/deciliter  HYDROmorphone  Injectable 1 milliGRAM(s) IV Push every 4 hours PRN breakthrough pain  sodium chloride 0.9% lock flush 10 milliLiter(s) IV Push every 1 hour PRN Pre/post blood products, medications, blood draw, and to maintain line patency        Vital Signs Last 24 Hrs  T(C): 37.1 (21 Jun 2023 12:00), Max: 37.1 (21 Jun 2023 09:00)  T(F): 98.7 (21 Jun 2023 12:00), Max: 98.7 (21 Jun 2023 09:00)  HR: 97 (21 Jun 2023 15:00) (78 - 107)  BP: 129/71 (21 Jun 2023 15:00) (116/71 - 160/82)  BP(mean): 94 (21 Jun 2023 15:00) (87 - 119)  RR: 18 (21 Jun 2023 15:00) (9 - 23)  SpO2: 98% (21 Jun 2023 15:00) (95% - 100%)    Parameters below as of 21 Jun 2023 15:00  Patient On (Oxygen Delivery Method): tracheostomy collar  O2 Flow (L/min): 10  O2 Concentration (%): 40    PHYSICAL EXAM:  Constitutional: NAD  Eyes: IVAN, EOMI  Ear/Nose/Throat: no oral lesion, no sinus tenderness on percussion	  Neck: no JVD, no lymphadenopathy, supple  Respiratory: CTA marti  Cardiovascular: S1S2 RRR, no murmurs  Gastrointestinal:soft, (+) BS, no HSM  Extremities:no e/e/c  Vascular: DP Pulse:	right normal; left normal      LABS:                        8.2    17.67 )-----------( 736      ( 21 Jun 2023 05:30 )             25.2     06-21    143  |  106  |  26<H>  ----------------------------<  123<H>  3.1<L>   |  28  |  0.54    Ca    8.2<L>      21 Jun 2023 05:30  Phos  3.6     06-21  Mg     1.9     06-21        Urinalysis Basic - ( 21 Jun 2023 05:30 )    Color: x / Appearance: x / SG: x / pH: x  Gluc: 123 mg/dL / Ketone: x  / Bili: x / Urobili: x   Blood: x / Protein: x / Nitrite: x   Leuk Esterase: x / RBC: x / WBC x   Sq Epi: x / Non Sq Epi: x / Bacteria: x        MICROBIOLOGY: reviewed    RADIOLOGY & ADDITIONAL STUDIES: reviewed

## 2023-06-21 NOTE — PROGRESS NOTE ADULT - SUBJECTIVE AND OBJECTIVE BOX
1. 16 Fr teal LUQ peripancreatic abscess "Left Pancreatic CODIE Drain" placed on 5/24 (attached to gravity bag):   -20 cc thick purulent output in 24 hrs. 0 cc serosanguinous output in 24 hrs.    2. 16 Fr teal LUQ hematoma "Left Abdominal Hematoma CODIE Drain" placed on 5/25 and upsized on 5/26:   -25 cc serosanguinous output in 24 hrs. 5 cc serosanguinous output in 24 hrs.  Both flushed easily with 5 cc NS.

## 2023-06-22 LAB
ANION GAP SERPL CALC-SCNC: 11 MMOL/L — SIGNIFICANT CHANGE UP (ref 5–17)
BUN SERPL-MCNC: 28 MG/DL — HIGH (ref 7–23)
CALCIUM SERPL-MCNC: 7.7 MG/DL — LOW (ref 8.4–10.5)
CHLORIDE SERPL-SCNC: 104 MMOL/L — SIGNIFICANT CHANGE UP (ref 96–108)
CO2 SERPL-SCNC: 26 MMOL/L — SIGNIFICANT CHANGE UP (ref 22–31)
CREAT SERPL-MCNC: 0.53 MG/DL — SIGNIFICANT CHANGE UP (ref 0.5–1.3)
CULTURE RESULTS: SIGNIFICANT CHANGE UP
CULTURE RESULTS: SIGNIFICANT CHANGE UP
EGFR: 119 ML/MIN/1.73M2 — SIGNIFICANT CHANGE UP
GLUCOSE BLDC GLUCOMTR-MCNC: 91 MG/DL — SIGNIFICANT CHANGE UP (ref 70–99)
GLUCOSE SERPL-MCNC: 90 MG/DL — SIGNIFICANT CHANGE UP (ref 70–99)
HCT VFR BLD CALC: 23.5 % — LOW (ref 39–50)
HGB BLD-MCNC: 7.4 G/DL — LOW (ref 13–17)
MAGNESIUM SERPL-MCNC: 2.1 MG/DL — SIGNIFICANT CHANGE UP (ref 1.6–2.6)
MCHC RBC-ENTMCNC: 29.4 PG — SIGNIFICANT CHANGE UP (ref 27–34)
MCHC RBC-ENTMCNC: 31.5 GM/DL — LOW (ref 32–36)
MCV RBC AUTO: 93.3 FL — SIGNIFICANT CHANGE UP (ref 80–100)
NRBC # BLD: 0 /100 WBCS — SIGNIFICANT CHANGE UP (ref 0–0)
PHOSPHATE SERPL-MCNC: 4 MG/DL — SIGNIFICANT CHANGE UP (ref 2.5–4.5)
PLATELET # BLD AUTO: 688 K/UL — HIGH (ref 150–400)
POTASSIUM SERPL-MCNC: 3.5 MMOL/L — SIGNIFICANT CHANGE UP (ref 3.5–5.3)
POTASSIUM SERPL-SCNC: 3.5 MMOL/L — SIGNIFICANT CHANGE UP (ref 3.5–5.3)
RBC # BLD: 2.52 M/UL — LOW (ref 4.2–5.8)
RBC # FLD: 16.5 % — HIGH (ref 10.3–14.5)
SODIUM SERPL-SCNC: 141 MMOL/L — SIGNIFICANT CHANGE UP (ref 135–145)
SPECIMEN SOURCE: SIGNIFICANT CHANGE UP
SPECIMEN SOURCE: SIGNIFICANT CHANGE UP
VANCOMYCIN FLD-MCNC: 18.1 UG/ML — SIGNIFICANT CHANGE UP
WBC # BLD: 13.79 K/UL — HIGH (ref 3.8–10.5)
WBC # FLD AUTO: 13.79 K/UL — HIGH (ref 3.8–10.5)

## 2023-06-22 PROCEDURE — 99233 SBSQ HOSP IP/OBS HIGH 50: CPT | Mod: GC

## 2023-06-22 RX ORDER — VANCOMYCIN HCL 1 G
1500 VIAL (EA) INTRAVENOUS ONCE
Refills: 0 | Status: COMPLETED | OUTPATIENT
Start: 2023-06-22 | End: 2023-06-22

## 2023-06-22 RX ORDER — POTASSIUM CHLORIDE 20 MEQ
40 PACKET (EA) ORAL ONCE
Refills: 0 | Status: COMPLETED | OUTPATIENT
Start: 2023-06-22 | End: 2023-06-22

## 2023-06-22 RX ORDER — ALBUMIN HUMAN 25 %
250 VIAL (ML) INTRAVENOUS ONCE
Refills: 0 | Status: COMPLETED | OUTPATIENT
Start: 2023-06-22 | End: 2023-06-22

## 2023-06-22 RX ADMIN — CHLORHEXIDINE GLUCONATE 15 MILLILITER(S): 213 SOLUTION TOPICAL at 06:22

## 2023-06-22 RX ADMIN — Medication 975 MILLIGRAM(S): at 00:40

## 2023-06-22 RX ADMIN — Medication 975 MILLIGRAM(S): at 00:06

## 2023-06-22 RX ADMIN — Medication 975 MILLIGRAM(S): at 17:16

## 2023-06-22 RX ADMIN — SODIUM CHLORIDE 4 MILLILITER(S): 9 INJECTION INTRAMUSCULAR; INTRAVENOUS; SUBCUTANEOUS at 11:04

## 2023-06-22 RX ADMIN — PANTOPRAZOLE SODIUM 40 MILLIGRAM(S): 20 TABLET, DELAYED RELEASE ORAL at 11:05

## 2023-06-22 RX ADMIN — Medication 1 DROP(S): at 17:08

## 2023-06-22 RX ADMIN — SODIUM CHLORIDE 4 MILLILITER(S): 9 INJECTION INTRAMUSCULAR; INTRAVENOUS; SUBCUTANEOUS at 17:07

## 2023-06-22 RX ADMIN — Medication 2 MILLIGRAM(S): at 06:38

## 2023-06-22 RX ADMIN — OXYCODONE HYDROCHLORIDE 20 MILLIGRAM(S): 5 TABLET ORAL at 23:07

## 2023-06-22 RX ADMIN — HEPARIN SODIUM 5000 UNIT(S): 5000 INJECTION INTRAVENOUS; SUBCUTANEOUS at 23:04

## 2023-06-22 RX ADMIN — OXYCODONE HYDROCHLORIDE 20 MILLIGRAM(S): 5 TABLET ORAL at 07:22

## 2023-06-22 RX ADMIN — CHLORHEXIDINE GLUCONATE 1 APPLICATION(S): 213 SOLUTION TOPICAL at 06:23

## 2023-06-22 RX ADMIN — LACOSAMIDE 250 MILLIGRAM(S): 50 TABLET ORAL at 17:16

## 2023-06-22 RX ADMIN — Medication 975 MILLIGRAM(S): at 07:22

## 2023-06-22 RX ADMIN — Medication 15 MILLILITER(S): at 11:06

## 2023-06-22 RX ADMIN — Medication 975 MILLIGRAM(S): at 23:06

## 2023-06-22 RX ADMIN — HEPARIN SODIUM 5000 UNIT(S): 5000 INJECTION INTRAVENOUS; SUBCUTANEOUS at 06:20

## 2023-06-22 RX ADMIN — Medication 3 MILLILITER(S): at 05:33

## 2023-06-22 RX ADMIN — Medication 1 DROP(S): at 06:45

## 2023-06-22 RX ADMIN — LACOSAMIDE 250 MILLIGRAM(S): 50 TABLET ORAL at 06:21

## 2023-06-22 RX ADMIN — OXYCODONE HYDROCHLORIDE 20 MILLIGRAM(S): 5 TABLET ORAL at 11:05

## 2023-06-22 RX ADMIN — Medication 975 MILLIGRAM(S): at 12:05

## 2023-06-22 RX ADMIN — OXYCODONE HYDROCHLORIDE 20 MILLIGRAM(S): 5 TABLET ORAL at 17:17

## 2023-06-22 RX ADMIN — Medication 300 MILLIGRAM(S): at 08:55

## 2023-06-22 RX ADMIN — OXYCODONE HYDROCHLORIDE 20 MILLIGRAM(S): 5 TABLET ORAL at 00:40

## 2023-06-22 RX ADMIN — Medication 2 MILLIGRAM(S): at 13:13

## 2023-06-22 RX ADMIN — CHLORHEXIDINE GLUCONATE 15 MILLILITER(S): 213 SOLUTION TOPICAL at 17:08

## 2023-06-22 RX ADMIN — Medication 40 MILLIEQUIVALENT(S): at 06:46

## 2023-06-22 RX ADMIN — Medication 2 MILLIGRAM(S): at 23:10

## 2023-06-22 RX ADMIN — OXYCODONE HYDROCHLORIDE 20 MILLIGRAM(S): 5 TABLET ORAL at 06:23

## 2023-06-22 RX ADMIN — Medication 3 MILLILITER(S): at 11:04

## 2023-06-22 RX ADMIN — HYDROMORPHONE HYDROCHLORIDE 1 MILLIGRAM(S): 2 INJECTION INTRAMUSCULAR; INTRAVENOUS; SUBCUTANEOUS at 00:51

## 2023-06-22 RX ADMIN — HEPARIN SODIUM 5000 UNIT(S): 5000 INJECTION INTRAVENOUS; SUBCUTANEOUS at 13:12

## 2023-06-22 RX ADMIN — OXYCODONE HYDROCHLORIDE 20 MILLIGRAM(S): 5 TABLET ORAL at 00:07

## 2023-06-22 RX ADMIN — Medication 2.5 MILLIGRAM(S): at 11:06

## 2023-06-22 RX ADMIN — Medication 975 MILLIGRAM(S): at 11:05

## 2023-06-22 RX ADMIN — ERTAPENEM SODIUM 120 MILLIGRAM(S): 1 INJECTION, POWDER, LYOPHILIZED, FOR SOLUTION INTRAMUSCULAR; INTRAVENOUS at 23:07

## 2023-06-22 RX ADMIN — Medication 975 MILLIGRAM(S): at 06:22

## 2023-06-22 RX ADMIN — Medication 2.5 MILLIGRAM(S): at 06:20

## 2023-06-22 RX ADMIN — Medication 2.5 MILLIGRAM(S): at 23:02

## 2023-06-22 RX ADMIN — Medication 81 MILLIGRAM(S): at 13:12

## 2023-06-22 RX ADMIN — OXYCODONE HYDROCHLORIDE 20 MILLIGRAM(S): 5 TABLET ORAL at 12:05

## 2023-06-22 RX ADMIN — HYDROMORPHONE HYDROCHLORIDE 1 MILLIGRAM(S): 2 INJECTION INTRAMUSCULAR; INTRAVENOUS; SUBCUTANEOUS at 01:06

## 2023-06-22 RX ADMIN — Medication 2.5 MILLIGRAM(S): at 17:15

## 2023-06-22 RX ADMIN — Medication 125 MILLILITER(S): at 08:01

## 2023-06-22 RX ADMIN — Medication 975 MILLIGRAM(S): at 17:36

## 2023-06-22 RX ADMIN — SODIUM CHLORIDE 4 MILLILITER(S): 9 INJECTION INTRAMUSCULAR; INTRAVENOUS; SUBCUTANEOUS at 05:32

## 2023-06-22 RX ADMIN — OXYCODONE HYDROCHLORIDE 20 MILLIGRAM(S): 5 TABLET ORAL at 17:37

## 2023-06-22 RX ADMIN — Medication 3 MILLILITER(S): at 17:08

## 2023-06-22 NOTE — SPEAKING VALVE EVALUATION - CUFF PRE-EVALUATION: (EVALUATION OF TOLERANCE OF CUFF DEFLATION)
+intermittent phonation without digital occlusion/Tolerates cuff deflation/Phonation with digital occlusion

## 2023-06-22 NOTE — PROGRESS NOTE ADULT - ASSESSMENT
53yMale w/ PMHx of HTN, type A aortic dissection s/p Dacron grafts, AV resuspension in 2013, CAD s/p CABG x 1 SVG to RCA (5/2013 with Dr. Medina), seizure disorder, recent admission 3/20-4/14 for replacement of transverse aortic arch, second stage TEVAR and aorto-axillary bypass, AV replacement (bio 23mm), and CABG x 1 (SVG-RCA). Pt found to have endo leak and taken to OR for TEVAR revision on 5/5, Post op course c/b Klebsiella bacteremia (5/15), resp failure requiring intubation on 5/18, Mucus plugging s/p Bronch 5/19 and PEA arrest. Post operatively pt also found to have hemorrhagic pancreatitis with venu-pancreatic abscess possibly 2* to Depakote therefore s/p IR aspiration of peripancreatic fluid collection and paracentesis on 5/22, IR venu-pancreatic abscess drainage and placement of drainage catheter on 5/24. Pt then transferred from CTICU to SICU for further mgmt of hemorrhagic pancreatitis on 5/25. s/p IR placement of 2 new drainage catheters and catheter exchange on 5/26. LUQ drainage 5/30. OR 5/31 exlap washout & replacement of 3 new JPs and abthera vac with open abdomen. RTOR 6/1, no bleeding, evac of old blood, placement of feeding J-tube. failed extubation s/p trach 6/5 with pneumonia.    NEURO: Pain control: standing OxyIR 20q6h with dilaudid PRN, off ketamine (6/14). Hx seizures: epilepsy recs appreciated, Cont vimpat. Depakote weaned off due to concerns for drug-related hemorrhagic pancreatitis. Repeat EEG (ordered)  HEENT: Artificial tears, Torticollis - PT/OT following.   CV: s/p PEA arrest on 5/24 night. TTE (6/14) LVEF 75%, LVH, biatrial enlargement and elevated PA pressure (elevated from previous), mild to mod MR/TR . C/w ASA 81mg. Metoprolol 2.5q6. Diuresis as needed.   PULM: ARDS resolved - off NO, s/p multiple Mucus plug/ Lung collapse s/p bronch x3 (most recently on 5/26) most likely from outward obstruction: Cont rotating pt around the clock. Cont Duonebs, 3% saline. failed extubation 6/3: s/p Trach 6/5: Tolerating 40% Trach Collar, SLP consult for passy sindi valve.   GI/FEN: TF Vital 1.0 at 70cc via feeding J-tube with imodium 2mg BID, Protonix BID, Hemorrhagic pancreatitis: s/p multiple drain placements and paracentisis by IR team. Cdiff negative (6/19)  : AKF s/p CVVHD & iHD, now w/ renal recovery, off dialysis and aquaphoresis, voids via condom catheter.  HEME: Acute blood loss anemia requiring multiple transfusions  ENDO: mISS  ID:  recurrent fevers with Recent CT A/P without significant intra-abdominal pathology, likely respiratory etiology given secretions and Sputum growing kleb and enterobacter on 6/17. restarted Caspo, Erta, vanc (6/18--), now off caspo and last dose of vanco today.   PRIOR ABX: Ertapenem (6/13-6/16), meropenem (6/9-6/13), vanco x 1 (6/9, 6/18-6/22), caspofungin (6/9-6/12, 6/18-6/21), previous Kleb bacteremia from 5/15 & 5/17, subsequent bld cx negative, PNA w/ bronch cx kleb, enterobacter (zosyn, erta resistant), E faecalis, strep angionosus from 5/19, pancreatic abscess cx pan-sensitive kleb 5/22. completed Ceftriaxone( 6/5- 6/15) Chloe (5/21-6/5),  Caspo (5/23-5/30), Ampicillin (5/21- 5/27).   PPX: SCDs, SQH  LINES: PIVs // L rad kalpana (5/31-6/15), 5Fr PIV in LUE (6/13-15), Lsubclav HD Cath (5/31-6/12), RIJ TLC (5/22-5/27), RIJ HD cath (5/22-31), R Ax kalpana(5/12-5/31), LIJ TLC (5/23-6/1), RIJ TLC (6/1-13)   WOUNDS/DRAINS: Right OR CODIE, 2 left OR CODIE, left IR hematoma drain (bilious), left IR pancreatic drain  PT: PT/OT ordered 6/5. Out of bed to stretcher chair  Dispo: SICU

## 2023-06-22 NOTE — SPEAKING VALVE EVALUATION - SLP PERTINENT HISTORY OF CURRENT PROBLEM
Endoleak, s/p TEVAR revision on 5/5/23, complicated post-op course including resp failure requiring intubation 5/18, mucus plugging s/p bronch 5/19 and PEA arrest, phemorrhagic pancreatitis, s/p J-tube, failed extubation s/p trach 6/5.

## 2023-06-22 NOTE — SPEAKING VALVE EVALUATION - DIAGNOSTIC IMPRESSIONS
Pt tolerated the speaking valve well, with clear vocal quality and stable vitals, no air trapping, no signs of respiratory distress. Pt should wear the valve during waking hours with supervision from RN to improve functional communication, respiratory independence, and airflow through upper airway. Pt will need assistance removing valve given AMS.

## 2023-06-22 NOTE — SPEAKING VALVE EVALUATION - OBSERVATIONS
Pt received asleep, lethargic, aroused fully given verbal and tactile cues. Pt oriented to self and place, to year given choices in field of 2. Intermittent tangential, confabulatory verbal output. Pt did not reliably follow 1-step directives.

## 2023-06-22 NOTE — PROGRESS NOTE ADULT - SUBJECTIVE AND OBJECTIVE BOX
ON: another missed void, bladder scan 100cc - gave alb 5%250cc  6/21: Immodium frequency increased to q8. Pain regime transitioned back to liquid oxy - dose decre to 20q6 standing 2/2 somnolence. Vanc trough 16.2 - Vanc given.      SUBJECTIVE: Patient seen and examined bedside; no acute events overnight, following commands, denied pain.    aspirin  chewable 81 milliGRAM(s) Enteral Tube every 24 hours  ertapenem  IVPB 1000 milliGRAM(s) IV Intermittent every 24 hours  heparin   Injectable 5000 Unit(s) SubCutaneous every 8 hours  metoprolol tartrate Injectable 2.5 milliGRAM(s) IV Push every 6 hours  vancomycin  IVPB 1500 milliGRAM(s) IV Intermittent once      Vital Signs Last 24 Hrs  T(C): 36.8 (22 Jun 2023 05:04), Max: 37.2 (21 Jun 2023 17:51)  T(F): 98.2 (22 Jun 2023 05:04), Max: 98.9 (21 Jun 2023 17:51)  HR: 80 (22 Jun 2023 08:17) (80 - 107)  BP: 128/72 (22 Jun 2023 08:00) (116/71 - 156/91)  BP(mean): 94 (22 Jun 2023 08:00) (87 - 119)  RR: 18 (22 Jun 2023 08:17) (9 - 24)  SpO2: 100% (22 Jun 2023 08:17) (80% - 100%)    Parameters below as of 22 Jun 2023 08:17  Patient On (Oxygen Delivery Method): tracheostomy collar  O2 Flow (L/min): 10  O2 Concentration (%): 40  I&O's Detail    21 Jun 2023 07:01  -  22 Jun 2023 07:00  --------------------------------------------------------  IN:    Enteral Tube Flush: 380 mL    IV PiggyBack: 500 mL    IV PiggyBack: 100 mL    IV PiggyBack: 300 mL    Other (mL): 170 mL    Vital1.0: 1680 mL  Total IN: 3130 mL    OUT:    Bulb (mL): 10 mL    Bulb (mL): 40 mL    Bulb (mL): 15 mL    Drain (mL): 20 mL    Drain (mL): 0 mL    Rectal Tube (mL): 550 mL    Voided (mL): 400 mL  Total OUT: 1035 mL    Total NET: 2095 mL      22 Jun 2023 07:01  -  22 Jun 2023 08:50  --------------------------------------------------------  IN:    Vital1.0: 70 mL  Total IN: 70 mL    OUT:  Total OUT: 0 mL    Total NET: 70 mL      PE:    General: NAD, resting comfortably in bed  C/V: NSR, s1 s2  Pulm: On trach collar, nonlabored breathing, no respiratory distress  Abd: Soft, NTND; drains unchanged, all serous serousang except for hematoma which is still bilious; midline incision c/d/i  Extrem: [facility]        LABS:                        7.4    13.79 )-----------( 688      ( 22 Jun 2023 05:30 )             23.5     06-22    141  |  104  |  28<H>  ----------------------------<  90  3.5   |  26  |  0.53    Ca    7.7<L>      22 Jun 2023 05:30  Phos  4.0     06-22  Mg     2.1     06-22        Urinalysis Basic - ( 22 Jun 2023 05:30 )    Color: x / Appearance: x / SG: x / pH: x  Gluc: 90 mg/dL / Ketone: x  / Bili: x / Urobili: x   Blood: x / Protein: x / Nitrite: x   Leuk Esterase: x / RBC: x / WBC x   Sq Epi: x / Non Sq Epi: x / Bacteria: x        RADIOLOGY & ADDITIONAL STUDIES:

## 2023-06-22 NOTE — PROGRESS NOTE ADULT - SUBJECTIVE AND OBJECTIVE BOX
S: No new issues/events overnight, no new med c/o    O: ICU Vital Signs Last 24 Hrs  T(F): 98.8 (06-22-23 @ 09:00), Max: 98.9 (06-21-23 @ 17:51)  HR: 101 (06-22-23 @ 10:00) (80 - 107)  BP: 152/81 (06-22-23 @ 10:00) (116/71 - 156/91)  BP(mean): 108 (06-22-23 @ 10:00) (87 - 119)  ABP: --  RR: 17 (06-22-23 @ 10:00) (9 - 24)  SpO2: 99% (06-22-23 @ 10:00) (80% - 100%)    PHYSICAL EXAM:   Neurological: AAOx3, CNII-XII intact,  strength 5/5 b/l  ENT: mucus membrane moist, trach intact, cuff deflated, no bleeding.   Cardiovascular: RRR  Respiratory: CTA  Gastrointestinal: soft, ND, BS+, midline incision clean, no bleeding, no pus.   Extremities: warm, 1-2+ dependent edema  Vascular: no cyanosis/erythema  Skin: no rashes  MSK: no joint swelling.     LABS:    06-22    141  |  104  |  28<H>  ----------------------------<  90  3.5   |  26  |  0.53    Ca    7.7<L>      22 Jun 2023 05:30  Phos  4.0     06-22  Mg     2.1     06-22                          7.4    13.79 )-----------( 688      ( 22 Jun 2023 05:30 )             23.5     Urinalysis Basic - ( 22 Jun 2023 05:30 )    Color: x / Appearance: x / SG: x / pH: x  Gluc: 90 mg/dL / Ketone: x  / Bili: x / Urobili: x   Blood: x / Protein: x / Nitrite: x   Leuk Esterase: x / RBC: x / WBC x   Sq Epi: x / Non Sq Epi: x / Bacteria: x    CAPILLARY BLOOD GLUCOSE      POCT Blood Glucose.: 88 mg/dL (21 Jun 2023 17:40)  POCT Blood Glucose.: 153 mg/dL (21 Jun 2023 11:25)    MEDICATIONS  (STANDING):  acetaminophen   Oral Liquid .. 975 milliGRAM(s) Enteral Tube every 6 hours  albuterol/ipratropium for Nebulization 3 milliLiter(s) Nebulizer every 6 hours  artificial  tears Solution 1 Drop(s) Both EYES two times a day  aspirin  chewable 81 milliGRAM(s) Enteral Tube every 24 hours  chlorhexidine 0.12% Liquid 15 milliLiter(s) Oral Mucosa every 12 hours  chlorhexidine 2% Cloths 1 Application(s) Topical daily  dextrose 5%. 1000 milliLiter(s) (100 mL/Hr) IV Continuous <Continuous>  dextrose 50% Injectable 25 Gram(s) IV Push once  ertapenem  IVPB 1000 milliGRAM(s) IV Intermittent every 24 hours  glucagon  Injectable 1 milliGRAM(s) IntraMuscular once  heparin   Injectable 5000 Unit(s) SubCutaneous every 8 hours  insulin lispro (ADMELOG) corrective regimen sliding scale   SubCutaneous every 6 hours  lacosamide Solution 250 milliGRAM(s) Oral two times a day  loperamide Liquid 2 milliGRAM(s) Enteral Tube every 8 hours  metoprolol tartrate Injectable 2.5 milliGRAM(s) IV Push every 6 hours  multivitamin/minerals/iron Oral Solution (CENTRUM) 15 milliLiter(s) Oral daily  oxyCODONE    Solution 20 milliGRAM(s) Enteral Tube every 6 hours  pantoprazole  Injectable 40 milliGRAM(s) IV Push daily  sodium chloride 3%  Inhalation 4 milliLiter(s) Inhalation every 6 hours    MEDICATIONS  (PRN):  dextrose Oral Gel 15 Gram(s) Oral once PRN Blood Glucose LESS THAN 70 milliGRAM(s)/deciliter  HYDROmorphone  Injectable 1 milliGRAM(s) IV Push every 4 hours PRN breakthrough pain  sodium chloride 0.9% lock flush 10 milliLiter(s) IV Push every 1 hour PRN Pre/post blood products, medications, blood draw, and to maintain line patency      Cantrell:	  [ x] None	[ ] Daily Cantrell Order Placed	   Indication:	  [ ] Strict I and O's    [ ] Obstruction     [ ] Incontinence + Stage 3 or 4 Decubitus  Central Line:  [ ] None	   [ ]  Medication / TPN Administration     [ ] No Peripheral IV        S: No new issues/events overnight, no new med c/o    O: ICU Vital Signs Last 24 Hrs  T(F): 98.8 (06-22-23 @ 09:00), Max: 98.9 (06-21-23 @ 17:51)  HR: 101 (06-22-23 @ 10:00) (80 - 107)  BP: 152/81 (06-22-23 @ 10:00) (116/71 - 156/91)  BP(mean): 108 (06-22-23 @ 10:00) (87 - 119)  ABP: --  RR: 17 (06-22-23 @ 10:00) (9 - 24)  SpO2: 99% (06-22-23 @ 10:00) (80% - 100%)    PHYSICAL EXAM:   Neurological: AAOx3, CNII-XII intact,  strength 5/5 b/l  ENT: mucus membrane moist, trach intact, cuff deflated, no bleeding.   Cardiovascular: RRR  Respiratory: CTA  Gastrointestinal: soft, ND, BS+, midline incision clean, no bleeding, no pus. CODIE serosang  Extremities: warm, 1-2+ dependent edema  Vascular: no cyanosis/erythema  Skin: no rashes  MSK: no joint swelling.     LABS:    06-22    141  |  104  |  28<H>  ----------------------------<  90  3.5   |  26  |  0.53    Ca    7.7<L>      22 Jun 2023 05:30  Phos  4.0     06-22  Mg     2.1     06-22                          7.4    13.79 )-----------( 688      ( 22 Jun 2023 05:30 )             23.5     Urinalysis Basic - ( 22 Jun 2023 05:30 )    Color: x / Appearance: x / SG: x / pH: x  Gluc: 90 mg/dL / Ketone: x  / Bili: x / Urobili: x   Blood: x / Protein: x / Nitrite: x   Leuk Esterase: x / RBC: x / WBC x   Sq Epi: x / Non Sq Epi: x / Bacteria: x    CAPILLARY BLOOD GLUCOSE      POCT Blood Glucose.: 88 mg/dL (21 Jun 2023 17:40)  POCT Blood Glucose.: 153 mg/dL (21 Jun 2023 11:25)    MEDICATIONS  (STANDING):  acetaminophen   Oral Liquid .. 975 milliGRAM(s) Enteral Tube every 6 hours  albuterol/ipratropium for Nebulization 3 milliLiter(s) Nebulizer every 6 hours  artificial  tears Solution 1 Drop(s) Both EYES two times a day  aspirin  chewable 81 milliGRAM(s) Enteral Tube every 24 hours  chlorhexidine 0.12% Liquid 15 milliLiter(s) Oral Mucosa every 12 hours  chlorhexidine 2% Cloths 1 Application(s) Topical daily  dextrose 5%. 1000 milliLiter(s) (100 mL/Hr) IV Continuous <Continuous>  dextrose 50% Injectable 25 Gram(s) IV Push once  ertapenem  IVPB 1000 milliGRAM(s) IV Intermittent every 24 hours  glucagon  Injectable 1 milliGRAM(s) IntraMuscular once  heparin   Injectable 5000 Unit(s) SubCutaneous every 8 hours  insulin lispro (ADMELOG) corrective regimen sliding scale   SubCutaneous every 6 hours  lacosamide Solution 250 milliGRAM(s) Oral two times a day  loperamide Liquid 2 milliGRAM(s) Enteral Tube every 8 hours  metoprolol tartrate Injectable 2.5 milliGRAM(s) IV Push every 6 hours  multivitamin/minerals/iron Oral Solution (CENTRUM) 15 milliLiter(s) Oral daily  oxyCODONE    Solution 20 milliGRAM(s) Enteral Tube every 6 hours  pantoprazole  Injectable 40 milliGRAM(s) IV Push daily  sodium chloride 3%  Inhalation 4 milliLiter(s) Inhalation every 6 hours    MEDICATIONS  (PRN):  dextrose Oral Gel 15 Gram(s) Oral once PRN Blood Glucose LESS THAN 70 milliGRAM(s)/deciliter  HYDROmorphone  Injectable 1 milliGRAM(s) IV Push every 4 hours PRN breakthrough pain  sodium chloride 0.9% lock flush 10 milliLiter(s) IV Push every 1 hour PRN Pre/post blood products, medications, blood draw, and to maintain line patency      Cantrell:	  [ x] None	[ ] Daily Cantrell Order Placed	   Indication:	  [ ] Strict I and O's    [ ] Obstruction     [ ] Incontinence + Stage 3 or 4 Decubitus  Central Line:  [ x] None	   [ ]  Medication / TPN Administration     [ ] No Peripheral IV

## 2023-06-22 NOTE — SPEAKING VALVE EVALUATION - RECOMMENDED SPEAKING VALVE GUIDELINES
Placement of speaking valve as tolerated/During waking hours only/Place on hub of tracheostomy.../Suction prior to placement/Cuff fully deflated

## 2023-06-22 NOTE — CHART NOTE - NSCHARTNOTEFT_GEN_A_CORE
Admitting Diagnosis:   Patient is a 53y old  Male who presents with a chief complaint of endoleak, abdominal pain (22 Jun 2023 10:45)      PAST MEDICAL & SURGICAL HISTORY:  HTN (hypertension)      Aortic dissection      CAD (coronary artery disease)      Seizure disorder      Seizure disorder      Hypertension      Status post endovascular aneurysm repair (EVAR)      S/P aortic bifurcation bypass graft      S/P CABG x 1          Current Nutrition Order:    PO Intake: Good (%) [   ]  Fair (50-75%) [   ] Poor (<25%) [   ]    GI Issues:     Pain:    Skin Integrity:    Labs:   06-22    141  |  104  |  28<H>  ----------------------------<  90  3.5   |  26  |  0.53    Ca    7.7<L>      22 Jun 2023 05:30  Phos  4.0     06-22  Mg     2.1     06-22      CAPILLARY BLOOD GLUCOSE      POCT Blood Glucose.: 88 mg/dL (21 Jun 2023 17:40)      Medications:  MEDICATIONS  (STANDING):  acetaminophen   Oral Liquid .. 975 milliGRAM(s) Enteral Tube every 6 hours  albuterol/ipratropium for Nebulization 3 milliLiter(s) Nebulizer every 6 hours  artificial  tears Solution 1 Drop(s) Both EYES two times a day  aspirin  chewable 81 milliGRAM(s) Enteral Tube every 24 hours  chlorhexidine 0.12% Liquid 15 milliLiter(s) Oral Mucosa every 12 hours  chlorhexidine 2% Cloths 1 Application(s) Topical daily  dextrose 5%. 1000 milliLiter(s) (100 mL/Hr) IV Continuous <Continuous>  dextrose 50% Injectable 25 Gram(s) IV Push once  ertapenem  IVPB 1000 milliGRAM(s) IV Intermittent every 24 hours  glucagon  Injectable 1 milliGRAM(s) IntraMuscular once  heparin   Injectable 5000 Unit(s) SubCutaneous every 8 hours  insulin lispro (ADMELOG) corrective regimen sliding scale   SubCutaneous every 6 hours  lacosamide Solution 250 milliGRAM(s) Oral two times a day  loperamide Liquid 2 milliGRAM(s) Enteral Tube every 8 hours  metoprolol tartrate Injectable 2.5 milliGRAM(s) IV Push every 6 hours  multivitamin/minerals/iron Oral Solution (CENTRUM) 15 milliLiter(s) Oral daily  oxyCODONE    Solution 20 milliGRAM(s) Enteral Tube every 6 hours  pantoprazole  Injectable 40 milliGRAM(s) IV Push daily  sodium chloride 3%  Inhalation 4 milliLiter(s) Inhalation every 6 hours    MEDICATIONS  (PRN):  dextrose Oral Gel 15 Gram(s) Oral once PRN Blood Glucose LESS THAN 70 milliGRAM(s)/deciliter  HYDROmorphone  Injectable 1 milliGRAM(s) IV Push every 4 hours PRN breakthrough pain  sodium chloride 0.9% lock flush 10 milliLiter(s) IV Push every 1 hour PRN Pre/post blood products, medications, blood draw, and to maintain line patency      Weight:  Daily     Daily     Weight Change:     Estimated energy needs:     Subjective:     Previous Nutrition Diagnosis:    Active [   ]  Resolved [   ]    If resolved, new PES:     Goal:    Recommendations:    Education:     Risk Level: High [   ] Moderate [   ] Low [   ] Admitting Diagnosis:   Patient is a 53y old  Male who presents with a chief complaint of endoleak, abdominal pain (2023 10:45)      PAST MEDICAL & SURGICAL HISTORY:  HTN (hypertension)      Aortic dissection      CAD (coronary artery disease)      Seizure disorder      Seizure disorder      Hypertension      Status post endovascular aneurysm repair (EVAR)      S/P aortic bifurcation bypass graft      S/P CABG x 1          Current Nutrition Order: Jevity 1.0 @70ml/hr x 24hrs; provides 1680ml total, 1680 kcals, 67.2g protein, 1401ml free water daily   + 1 LPS BID; provides 200 kcals, 30g protein    PO Intake: Good (%) [   ]  Fair (50-75%) [   ] Poor (<25%) [   ]- N/A    GI Issues: ongoing diarrhea, improving    Pain: No pain/discomfort noted    Skin Integrity: unstageable PUs to sacrum & coccyx, DTIs to scapula, L ear, stage II PU to penis, CODIE drains x 3, pancreatic drain, J tube, 2+ edema to extremities    Labs:       141  |  104  |  28<H>  ----------------------------<  90  3.5   |  26  |  0.53    Ca    7.7<L>      2023 05:30  Phos  4.0       Mg     2.1           CAPILLARY BLOOD GLUCOSE      POCT Blood Glucose.: 88 mg/dL (2023 17:40)      Medications:  MEDICATIONS  (STANDING):  acetaminophen   Oral Liquid .. 975 milliGRAM(s) Enteral Tube every 6 hours  albuterol/ipratropium for Nebulization 3 milliLiter(s) Nebulizer every 6 hours  artificial  tears Solution 1 Drop(s) Both EYES two times a day  aspirin  chewable 81 milliGRAM(s) Enteral Tube every 24 hours  chlorhexidine 0.12% Liquid 15 milliLiter(s) Oral Mucosa every 12 hours  chlorhexidine 2% Cloths 1 Application(s) Topical daily  dextrose 5%. 1000 milliLiter(s) (100 mL/Hr) IV Continuous <Continuous>  dextrose 50% Injectable 25 Gram(s) IV Push once  ertapenem  IVPB 1000 milliGRAM(s) IV Intermittent every 24 hours  glucagon  Injectable 1 milliGRAM(s) IntraMuscular once  heparin   Injectable 5000 Unit(s) SubCutaneous every 8 hours  insulin lispro (ADMELOG) corrective regimen sliding scale   SubCutaneous every 6 hours  lacosamide Solution 250 milliGRAM(s) Oral two times a day  loperamide Liquid 2 milliGRAM(s) Enteral Tube every 8 hours  metoprolol tartrate Injectable 2.5 milliGRAM(s) IV Push every 6 hours  multivitamin/minerals/iron Oral Solution (CENTRUM) 15 milliLiter(s) Oral daily  oxyCODONE    Solution 20 milliGRAM(s) Enteral Tube every 6 hours  pantoprazole  Injectable 40 milliGRAM(s) IV Push daily  sodium chloride 3%  Inhalation 4 milliLiter(s) Inhalation every 6 hours    MEDICATIONS  (PRN):  dextrose Oral Gel 15 Gram(s) Oral once PRN Blood Glucose LESS THAN 70 milliGRAM(s)/deciliter  HYDROmorphone  Injectable 1 milliGRAM(s) IV Push every 4 hours PRN breakthrough pain  sodium chloride 0.9% lock flush 10 milliLiter(s) IV Push every 1 hour PRN Pre/post blood products, medications, blood draw, and to maintain line patency      Weight: 90.9kg  --- no new wt obtained  70kg dosing wt May &     Weight Change: wt change ? 2/2 edema, need reweight to confirm. recommend daily weights for trending    Estimated energy needs:   Previous wt of 70kg used for energy calculations as falls within % IBW  Needs adjusted for age, clinical conditions, pressure ulcers  25-30 kcals/[id number] kcals/day  1.2-1.7 g/k-119g protein/day  Fluids per team.    Subjective:   53yMale w/ PMHx of HTN, type A aortic dissection s/p Dacron grafts, AV resuspension in , CAD s/p CABG x 1 SVG to RCA (2013 with Dr. Medina), seizure disorder, recent admission 3/20- for replacement of transverse aortic arch, second stage TEVAR and aorto-axillary bypass, AV replacement (bio 23mm), and CABG x 1 (SVG-RCA). Pt found to have endo leak and taken to OR for TEVAR revision on , Post op course c/b Klebsiella bacteremia (5/15), resp failure requiring intubation on , Mucus plugging s/p Bronch  and PEA arrest. Post operatively pt also found to have hemorrhagic pancreatitis with venu-pancreatic abscess possibly 2* to Depakote therefore s/p IR aspiration of peripancreatic fluid collection and paracentesis on , IR venu-pancreatic abscess drainage and placement of drainage catheter on . Pt then transferred from CTICU to SICU for further mgmt of hemorrhagic pancreatitis on . s/p IR placement of 2 new drainage catheters and catheter exchange on . LUQ drainage . OR  exlap washout & replacement of 3 new JPs and abthera vac with open abdomen. RTOR , no bleeding, evac of old blood, placement of feeding J-tube. Failed extubation 6/3: s/p Trach .    Chart reviewed. Case discussed with IDT. Afebrile. HR & BP WNL. MAPs >65 mmHg. EN remains primary means to nutrition via J tube, w/ trach collar. I&Os x 24hrs: 3130 [1680ml TF + 380ml flush / 1035 [400ml void + 550ml rectal tube output + 65ml output bulbs + 30ml drain output + = +2095ml net. Ongoing diarrhea, noted less today [imodium increased]. Current TF provides 7.1g fiber/day, consider addition of banatrol [not contraindicated iso PEJ]. Labs reviewed- K+ trending low, needs repletion. BUN high, Creat & GFR WNL, fluids per team. POC BG trending 88-153mg/dL x 24hrs. Per SLP eval today- PO diet not feasible 2/2 mental status, recommendation to continue NPO w/ EN. Current provision adequate to meet estimated nutrition needs, provides 26.8kcals/kg & 1.38g protein/kg. RDN will continue to monitor, reassess, and intervene as appropriate.     Previous Nutrition Diagnosis:  Increased nutrients RT increased demands AEB Vent, Pressure ulcers    Active [  X ]  Resolved [   ]    If resolved, new PES:     Goal:  Pt will meet at least 75% of protein & energy needs via most appropriate route for nutrition     Recommendations:  1. continue EN via PEJ as tolerated  - per team- Vital 1.0 @70ml/hr x 24hrs; 1680 ml total volume, 1680kcals, 67.2g protein, 1404ml free water daily  - can increase LPS to TID [+ 300kcals, 30g protein] to provide 28.2 kcals/kg 1.6g protein/kg  2. GI  - Recommend Banatrol daily- flush before / after administration  3. Lytes per team  - risk for depletion iso diarrhea  - K+ please replenish  4. Hydration per team  5. I&Os  6. Monitor chemistry, GI function, skin integrity    Education: deferred     Risk Level: High [  X ] Moderate [   ] Low [   ]

## 2023-06-22 NOTE — PROGRESS NOTE ADULT - SUBJECTIVE AND OBJECTIVE BOX
1. 16 Fr teal LUQ peripancreatic abscess "Left Pancreatic CODIE Drain" placed on 5/24 (attached to gravity bag):   -20 cc thick purulent output in 24 hrs. 20 cc serosanguinous output in 24 hrs.    2. 16 Fr teal LUQ hematoma "Left Abdominal Hematoma CODIE Drain" placed on 5/25 and upsized on 5/26:   -0 cc serosanguinous output in 24 hrs. 25 cc serosanguinous output in 24 hrs.  Both flushed easily with 5 cc NS.

## 2023-06-22 NOTE — PROGRESS NOTE ADULT - ASSESSMENT
53yMale w/ PMHx of HTN, type A aortic dissection s/p Dacron grafts, AV resuspension in 2013, CAD s/p CABG x 1 SVG to RCA (5/2013 with Dr. Medina), seizure disorder, recent admission 3/20-4/14 for replacement of transverse aortic arch, second stage TEVAR and aorto-axillary bypass, AV replacement (bio 23mm), and CABG x 1 (SVG-RCA). Pt found to have endo leak and taken to OR for TEVAR revision on 5/5, Post op course c/b Klebsiella bacteremia (5/15), resp failure requiring intubation on 5/18, Mucus plugging s/p Bronch 5/19 and PEA arrest. Post operatively pt also found to have hemorrhagic pancreatitis with venu-pancreatic abscess possibly 2* to Depakote therefore s/p IR aspiration of peripancreatic fluid collection and paracentesis on 5/22, IR venu-pancreatic abscess drainage and placement of drainage catheter on 5/24. Pt then transferred from CTICU to SICU for further mgmt of hemorrhagic pancreatitis on 5/25. s/p IR placement of 2 new drainage catheters and catheter exchange on 5/26. LUQ drainage 5/30. OR 5/31 exlap washout & replacement of 3 new JPs and abthera vac with open abdomen. RTOR 6/1, no bleeding, evac of old blood, placement of feeding J-tube. failed extubation s/p trach 6/5.     Plan:    - ENT for passy sindi valve   - OOB  - PT  - Lasix prn (goal net even)  - Follow-up sputum culture speciation   - ID recs  - Continue tube feeds  - Monitor drain outputs   - Remainder of care per ICU  - Please page Surgery Team 1 at [phone] with any questions and/or clinical changes

## 2023-06-23 LAB
ANION GAP SERPL CALC-SCNC: 10 MMOL/L — SIGNIFICANT CHANGE UP (ref 5–17)
ANION GAP SERPL CALC-SCNC: 7 MMOL/L — SIGNIFICANT CHANGE UP (ref 5–17)
BUN SERPL-MCNC: 23 MG/DL — SIGNIFICANT CHANGE UP (ref 7–23)
BUN SERPL-MCNC: 24 MG/DL — HIGH (ref 7–23)
CALCIUM SERPL-MCNC: 8.3 MG/DL — LOW (ref 8.4–10.5)
CALCIUM SERPL-MCNC: 8.5 MG/DL — SIGNIFICANT CHANGE UP (ref 8.4–10.5)
CHLORIDE SERPL-SCNC: 106 MMOL/L — SIGNIFICANT CHANGE UP (ref 96–108)
CHLORIDE SERPL-SCNC: 107 MMOL/L — SIGNIFICANT CHANGE UP (ref 96–108)
CO2 SERPL-SCNC: 24 MMOL/L — SIGNIFICANT CHANGE UP (ref 22–31)
CO2 SERPL-SCNC: 28 MMOL/L — SIGNIFICANT CHANGE UP (ref 22–31)
CREAT SERPL-MCNC: 0.51 MG/DL — SIGNIFICANT CHANGE UP (ref 0.5–1.3)
CREAT SERPL-MCNC: 0.53 MG/DL — SIGNIFICANT CHANGE UP (ref 0.5–1.3)
EGFR: 119 ML/MIN/1.73M2 — SIGNIFICANT CHANGE UP
EGFR: 120 ML/MIN/1.73M2 — SIGNIFICANT CHANGE UP
GLUCOSE BLDC GLUCOMTR-MCNC: 82 MG/DL — SIGNIFICANT CHANGE UP (ref 70–99)
GLUCOSE BLDC GLUCOMTR-MCNC: 82 MG/DL — SIGNIFICANT CHANGE UP (ref 70–99)
GLUCOSE BLDC GLUCOMTR-MCNC: 92 MG/DL — SIGNIFICANT CHANGE UP (ref 70–99)
GLUCOSE SERPL-MCNC: 103 MG/DL — HIGH (ref 70–99)
GLUCOSE SERPL-MCNC: 83 MG/DL — SIGNIFICANT CHANGE UP (ref 70–99)
HCT VFR BLD CALC: 25.1 % — LOW (ref 39–50)
HGB BLD-MCNC: 7.8 G/DL — LOW (ref 13–17)
MAGNESIUM SERPL-MCNC: 2.2 MG/DL — SIGNIFICANT CHANGE UP (ref 1.6–2.6)
MCHC RBC-ENTMCNC: 29.8 PG — SIGNIFICANT CHANGE UP (ref 27–34)
MCHC RBC-ENTMCNC: 31.1 GM/DL — LOW (ref 32–36)
MCV RBC AUTO: 95.8 FL — SIGNIFICANT CHANGE UP (ref 80–100)
NRBC # BLD: 0 /100 WBCS — SIGNIFICANT CHANGE UP (ref 0–0)
PHOSPHATE SERPL-MCNC: 4.5 MG/DL — SIGNIFICANT CHANGE UP (ref 2.5–4.5)
PLATELET # BLD AUTO: 657 K/UL — HIGH (ref 150–400)
POTASSIUM SERPL-MCNC: 4.2 MMOL/L — SIGNIFICANT CHANGE UP (ref 3.5–5.3)
POTASSIUM SERPL-MCNC: SIGNIFICANT CHANGE UP (ref 3.5–5.3)
POTASSIUM SERPL-SCNC: 4.2 MMOL/L — SIGNIFICANT CHANGE UP (ref 3.5–5.3)
POTASSIUM SERPL-SCNC: SIGNIFICANT CHANGE UP (ref 3.5–5.3)
RBC # BLD: 2.62 M/UL — LOW (ref 4.2–5.8)
RBC # FLD: 17.2 % — HIGH (ref 10.3–14.5)
SODIUM SERPL-SCNC: 140 MMOL/L — SIGNIFICANT CHANGE UP (ref 135–145)
SODIUM SERPL-SCNC: 142 MMOL/L — SIGNIFICANT CHANGE UP (ref 135–145)
WBC # BLD: 15.61 K/UL — HIGH (ref 3.8–10.5)
WBC # FLD AUTO: 15.61 K/UL — HIGH (ref 3.8–10.5)

## 2023-06-23 PROCEDURE — 99233 SBSQ HOSP IP/OBS HIGH 50: CPT | Mod: GC

## 2023-06-23 RX ORDER — POTASSIUM CHLORIDE 20 MEQ
40 PACKET (EA) ORAL ONCE
Refills: 0 | Status: DISCONTINUED | OUTPATIENT
Start: 2023-06-23 | End: 2023-06-23

## 2023-06-23 RX ORDER — OXYCODONE HYDROCHLORIDE 5 MG/1
15 TABLET ORAL EVERY 6 HOURS
Refills: 0 | Status: DISCONTINUED | OUTPATIENT
Start: 2023-06-23 | End: 2023-06-26

## 2023-06-23 RX ORDER — POTASSIUM CHLORIDE 20 MEQ
40 PACKET (EA) ORAL ONCE
Refills: 0 | Status: COMPLETED | OUTPATIENT
Start: 2023-06-23 | End: 2023-06-23

## 2023-06-23 RX ORDER — HYDROMORPHONE HYDROCHLORIDE 2 MG/ML
1 INJECTION INTRAMUSCULAR; INTRAVENOUS; SUBCUTANEOUS EVERY 4 HOURS
Refills: 0 | Status: DISCONTINUED | OUTPATIENT
Start: 2023-06-23 | End: 2023-06-29

## 2023-06-23 RX ADMIN — HEPARIN SODIUM 5000 UNIT(S): 5000 INJECTION INTRAVENOUS; SUBCUTANEOUS at 22:53

## 2023-06-23 RX ADMIN — HEPARIN SODIUM 5000 UNIT(S): 5000 INJECTION INTRAVENOUS; SUBCUTANEOUS at 14:23

## 2023-06-23 RX ADMIN — OXYCODONE HYDROCHLORIDE 15 MILLIGRAM(S): 5 TABLET ORAL at 12:58

## 2023-06-23 RX ADMIN — SODIUM CHLORIDE 4 MILLILITER(S): 9 INJECTION INTRAMUSCULAR; INTRAVENOUS; SUBCUTANEOUS at 18:12

## 2023-06-23 RX ADMIN — Medication 975 MILLIGRAM(S): at 06:47

## 2023-06-23 RX ADMIN — CHLORHEXIDINE GLUCONATE 15 MILLILITER(S): 213 SOLUTION TOPICAL at 18:15

## 2023-06-23 RX ADMIN — Medication 40 MILLIEQUIVALENT(S): at 05:50

## 2023-06-23 RX ADMIN — OXYCODONE HYDROCHLORIDE 15 MILLIGRAM(S): 5 TABLET ORAL at 14:00

## 2023-06-23 RX ADMIN — Medication 2.5 MILLIGRAM(S): at 05:48

## 2023-06-23 RX ADMIN — Medication 3 MILLILITER(S): at 05:49

## 2023-06-23 RX ADMIN — Medication 2.5 MILLIGRAM(S): at 23:40

## 2023-06-23 RX ADMIN — Medication 975 MILLIGRAM(S): at 19:27

## 2023-06-23 RX ADMIN — Medication 2 MILLIGRAM(S): at 22:53

## 2023-06-23 RX ADMIN — CHLORHEXIDINE GLUCONATE 1 APPLICATION(S): 213 SOLUTION TOPICAL at 07:22

## 2023-06-23 RX ADMIN — LACOSAMIDE 250 MILLIGRAM(S): 50 TABLET ORAL at 05:49

## 2023-06-23 RX ADMIN — Medication 975 MILLIGRAM(S): at 13:00

## 2023-06-23 RX ADMIN — OXYCODONE HYDROCHLORIDE 20 MILLIGRAM(S): 5 TABLET ORAL at 06:47

## 2023-06-23 RX ADMIN — OXYCODONE HYDROCHLORIDE 15 MILLIGRAM(S): 5 TABLET ORAL at 19:27

## 2023-06-23 RX ADMIN — HEPARIN SODIUM 5000 UNIT(S): 5000 INJECTION INTRAVENOUS; SUBCUTANEOUS at 05:47

## 2023-06-23 RX ADMIN — Medication 15 MILLILITER(S): at 14:24

## 2023-06-23 RX ADMIN — HYDROMORPHONE HYDROCHLORIDE 1 MILLIGRAM(S): 2 INJECTION INTRAMUSCULAR; INTRAVENOUS; SUBCUTANEOUS at 16:38

## 2023-06-23 RX ADMIN — Medication 2 MILLIGRAM(S): at 07:21

## 2023-06-23 RX ADMIN — Medication 3 MILLILITER(S): at 18:13

## 2023-06-23 RX ADMIN — Medication 2 MILLIGRAM(S): at 14:23

## 2023-06-23 RX ADMIN — Medication 975 MILLIGRAM(S): at 05:47

## 2023-06-23 RX ADMIN — Medication 81 MILLIGRAM(S): at 12:58

## 2023-06-23 RX ADMIN — Medication 1 DROP(S): at 07:21

## 2023-06-23 RX ADMIN — OXYCODONE HYDROCHLORIDE 15 MILLIGRAM(S): 5 TABLET ORAL at 18:12

## 2023-06-23 RX ADMIN — SODIUM CHLORIDE 4 MILLILITER(S): 9 INJECTION INTRAMUSCULAR; INTRAVENOUS; SUBCUTANEOUS at 05:49

## 2023-06-23 RX ADMIN — Medication 975 MILLIGRAM(S): at 23:35

## 2023-06-23 RX ADMIN — PANTOPRAZOLE SODIUM 40 MILLIGRAM(S): 20 TABLET, DELAYED RELEASE ORAL at 12:59

## 2023-06-23 RX ADMIN — Medication 3 MILLILITER(S): at 23:34

## 2023-06-23 RX ADMIN — SODIUM CHLORIDE 4 MILLILITER(S): 9 INJECTION INTRAMUSCULAR; INTRAVENOUS; SUBCUTANEOUS at 23:39

## 2023-06-23 RX ADMIN — Medication 975 MILLIGRAM(S): at 18:13

## 2023-06-23 RX ADMIN — Medication 1 DROP(S): at 18:13

## 2023-06-23 RX ADMIN — CHLORHEXIDINE GLUCONATE 15 MILLILITER(S): 213 SOLUTION TOPICAL at 05:48

## 2023-06-23 RX ADMIN — OXYCODONE HYDROCHLORIDE 15 MILLIGRAM(S): 5 TABLET ORAL at 23:34

## 2023-06-23 RX ADMIN — SODIUM CHLORIDE 4 MILLILITER(S): 9 INJECTION INTRAMUSCULAR; INTRAVENOUS; SUBCUTANEOUS at 00:33

## 2023-06-23 RX ADMIN — ERTAPENEM SODIUM 120 MILLIGRAM(S): 1 INJECTION, POWDER, LYOPHILIZED, FOR SOLUTION INTRAMUSCULAR; INTRAVENOUS at 23:33

## 2023-06-23 RX ADMIN — Medication 2.5 MILLIGRAM(S): at 12:59

## 2023-06-23 RX ADMIN — OXYCODONE HYDROCHLORIDE 20 MILLIGRAM(S): 5 TABLET ORAL at 05:48

## 2023-06-23 RX ADMIN — Medication 975 MILLIGRAM(S): at 14:00

## 2023-06-23 RX ADMIN — Medication 3 MILLILITER(S): at 00:33

## 2023-06-23 RX ADMIN — Medication 2.5 MILLIGRAM(S): at 18:14

## 2023-06-23 RX ADMIN — LACOSAMIDE 250 MILLIGRAM(S): 50 TABLET ORAL at 18:55

## 2023-06-23 NOTE — PROGRESS NOTE ADULT - SUBJECTIVE AND OBJECTIVE BOX
SUBJECTIVE: Patient seen and examined at bedside.    aspirin  chewable 81 milliGRAM(s) Enteral Tube every 24 hours  ertapenem  IVPB 1000 milliGRAM(s) IV Intermittent every 24 hours  heparin   Injectable 5000 Unit(s) SubCutaneous every 8 hours  metoprolol tartrate Injectable 2.5 milliGRAM(s) IV Push every 6 hours    MEDICATIONS  (PRN):  dextrose Oral Gel 15 Gram(s) Oral once PRN Blood Glucose LESS THAN 70 milliGRAM(s)/deciliter  HYDROmorphone  Injectable 1 milliGRAM(s) IV Push every 4 hours PRN breakthrough pain  sodium chloride 0.9% lock flush 10 milliLiter(s) IV Push every 1 hour PRN Pre/post blood products, medications, blood draw, and to maintain line patency      I&O's Detail    22 Jun 2023 07:01  -  23 Jun 2023 07:00  --------------------------------------------------------  IN:    Albumin 5%  - 250 mL: 250 mL    Enteral Tube Flush: 140 mL    IV PiggyBack: 500 mL    IV PiggyBack: 500 mL    Other (mL): 50 mL    Vital1.0: 1680 mL  Total IN: 3120 mL    OUT:    Bulb (mL): 0 mL    Bulb (mL): 0 mL    Bulb (mL): 0 mL    Drain (mL): 0 mL    Drain (mL): 0 mL    Rectal Tube (mL): 500 mL    Voided (mL): 200 mL  Total OUT: 700 mL    Total NET: 2420 mL      23 Jun 2023 07:01  -  23 Jun 2023 07:36  --------------------------------------------------------  IN:    Vital1.0: 70 mL  Total IN: 70 mL    OUT:  Total OUT: 0 mL    Total NET: 70 mL          T(C): 37.1 (06-23-23 @ 05:46), Max: 37.1 (06-22-23 @ 09:00)  HR: 81 (06-23-23 @ 07:00) (80 - 101)  BP: 147/77 (06-23-23 @ 07:00) (126/76 - 159/72)  RR: 14 (06-23-23 @ 07:00) (11 - 33)  SpO2: 100% (06-23-23 @ 07:00) (93% - 100%)    GENERAL: NAD, Resting comfortably in bed, awake, opens eyes spontaneously  HEENT: NCAT, MMM, Normal conjunctiva, PERRL  RESP: Nonlabored breathing, No respiratory distress  CARD: Normal rate, Normal peripheral perfusion  GI: Soft, ND, NT, No guarding, No rebound tenderness  EXTREM: WWP, No edema, No gross deformity of extremities  SKIN: No rashes, no lesions  NEURO: AAOx3, No focal motor or sensory deficits  PSYCH: Affect and characteristics of appearance, verbalizations, and behaviors are appropriate    LABS:                        7.4    13.79 )-----------( 688      ( 22 Jun 2023 05:30 )             23.5     06-22    141  |  104  |  28<H>  ----------------------------<  90  3.5   |  26  |  0.53    Ca    7.7<L>      22 Jun 2023 05:30  Phos  4.0     06-22  Mg     2.1     06-22        Urinalysis Basic - ( 22 Jun 2023 05:30 )    Color: x / Appearance: x / SG: x / pH: x  Gluc: 90 mg/dL / Ketone: x  / Bili: x / Urobili: x   Blood: x / Protein: x / Nitrite: x   Leuk Esterase: x / RBC: x / WBC x   Sq Epi: x / Non Sq Epi: x / Bacteria: x        RADIOLOGY & ADDITIONAL STUDIES:

## 2023-06-23 NOTE — PROGRESS NOTE ADULT - ASSESSMENT
53yMale w/ PMHx of HTN, type A aortic dissection s/p Dacron grafts, AV resuspension in 2013, CAD s/p CABG x 1 SVG to RCA (5/2013 with Dr. Medina), seizure disorder, recent admission 3/20-4/14 for replacement of transverse aortic arch, second stage TEVAR and aorto-axillary bypass, AV replacement (bio 23mm), and CABG x 1 (SVG-RCA). Pt found to have endo leak and taken to OR for TEVAR revision on 5/5, Post op course c/b Klebsiella bacteremia (5/15), resp failure requiring intubation on 5/18, Mucus plugging s/p Bronch 5/19 and PEA arrest. Post operatively pt also found to have hemorrhagic pancreatitis with venu-pancreatic abscess possibly 2* to Depakote therefore s/p IR aspiration of peripancreatic fluid collection and paracentesis on 5/22, IR venu-pancreatic abscess drainage and placement of drainage catheter on 5/24. Pt then transferred from CTICU to SICU for further mgmt of hemorrhagic pancreatitis on 5/25. s/p IR placement of 2 new drainage catheters and catheter exchange on 5/26. LUQ drainage 5/30. OR 5/31 exlap washout & replacement of 3 new JPs and abthera vac with open abdomen. RTOR 6/1, no bleeding, evac of old blood, placement of feeding J-tube. failed extubation s/p trach 6/5.     PLAN  - Closely monitor urine output  - Continue Passy Novi during waking hours  - Continue out of bed to chair  - Continue Physical Therapy  - Continue Lasix prn (goal net even)  - Continue tube feeds  - Continue to monitor drain output  - Follow-up ID recommendations  - Re-evaluate for swallow test  - Remainder of care per ICU  - Please page Surgery Team 1 at [phone] with any questions and/or clinical changes

## 2023-06-23 NOTE — PROGRESS NOTE ADULT - SUBJECTIVE AND OBJECTIVE BOX
INTERVAL/OVERNIGHT EVENTS: NAEO. Had several missed voids.     SUBJECTIVE: This AM, doing well and pain is controlled. No N/V or CP/SOB. Pt able to cough secretions. Per RN, only needed to suction x1. Passy sindi valve is no and pt voicing feeling better today.    Neurologic Medications  acetaminophen   Oral Liquid .. 975 milliGRAM(s) Enteral Tube every 6 hours  HYDROmorphone  Injectable 1 milliGRAM(s) IV Push every 4 hours PRN breakthrough pain  lacosamide Solution 250 milliGRAM(s) Oral two times a day  oxyCODONE    Solution 15 milliGRAM(s) Oral every 6 hours    Respiratory Medications  albuterol/ipratropium for Nebulization 3 milliLiter(s) Nebulizer every 6 hours  sodium chloride 3%  Inhalation 4 milliLiter(s) Inhalation every 6 hours    Cardiovascular Medications  metoprolol tartrate Injectable 2.5 milliGRAM(s) IV Push every 6 hours    Gastrointestinal Medications  dextrose 5%. 1000 milliLiter(s) IV Continuous <Continuous>  loperamide Liquid 2 milliGRAM(s) Enteral Tube every 8 hours  multivitamin/minerals/iron Oral Solution (CENTRUM) 15 milliLiter(s) Oral daily  pantoprazole  Injectable 40 milliGRAM(s) IV Push daily  sodium chloride 0.9% lock flush 10 milliLiter(s) IV Push every 1 hour PRN Pre/post blood products, medications, blood draw, and to maintain line patency    Hematologic/Oncologic Medications  aspirin  chewable 81 milliGRAM(s) Enteral Tube every 24 hours  heparin   Injectable 5000 Unit(s) SubCutaneous every 8 hours    Antimicrobial/Immunologic Medications  ertapenem  IVPB 1000 milliGRAM(s) IV Intermittent every 24 hours    Endocrine/Metabolic Medications  dextrose 50% Injectable 25 Gram(s) IV Push once  dextrose Oral Gel 15 Gram(s) Oral once PRN Blood Glucose LESS THAN 70 milliGRAM(s)/deciliter  glucagon  Injectable 1 milliGRAM(s) IntraMuscular once  insulin lispro (ADMELOG) corrective regimen sliding scale   SubCutaneous every 6 hours    Topical/Other Medications  artificial  tears Solution 1 Drop(s) Both EYES two times a day  chlorhexidine 0.12% Liquid 15 milliLiter(s) Oral Mucosa every 12 hours  chlorhexidine 2% Cloths 1 Application(s) Topical daily    MEDICATIONS  (PRN):  dextrose Oral Gel 15 Gram(s) Oral once PRN Blood Glucose LESS THAN 70 milliGRAM(s)/deciliter  HYDROmorphone  Injectable 1 milliGRAM(s) IV Push every 4 hours PRN breakthrough pain  sodium chloride 0.9% lock flush 10 milliLiter(s) IV Push every 1 hour PRN Pre/post blood products, medications, blood draw, and to maintain line patency    I&O's Detail    22 Jun 2023 07:01  -  23 Jun 2023 07:00  --------------------------------------------------------  IN:    Albumin 5%  - 250 mL: 250 mL    Enteral Tube Flush: 260 mL    IV PiggyBack: 500 mL    IV PiggyBack: 500 mL    Other (mL): 50 mL    Vital1.0: 1680 mL  Total IN: 3240 mL    OUT:    Bulb (mL): 5 mL    Bulb (mL): 5 mL    Bulb (mL): 5 mL    Drain (mL): 10 mL    Drain (mL): 15 mL    Rectal Tube (mL): 500 mL    Voided (mL): 200 mL  Total OUT: 740 mL    Total NET: 2500 mL    23 Jun 2023 07:01  -  23 Jun 2023 14:12  --------------------------------------------------------  IN:    Vital1.0: 70 mL  Total IN: 70 mL    OUT:  Total OUT: 0 mL    Total NET: 70 mL    Vital Signs Last 24 Hrs  T(C): 37.1 (23 Jun 2023 09:01), Max: 37.1 (23 Jun 2023 01:31)  T(F): 98.8 (23 Jun 2023 09:01), Max: 98.8 (23 Jun 2023 05:46)  HR: 89 (23 Jun 2023 12:00) (79 - 98)  BP: 144/81 (23 Jun 2023 12:00) (130/70 - 159/72)  BP(mean): 106 (23 Jun 2023 12:00) (93 - 115)  RR: 19 (23 Jun 2023 12:00) (11 - 26)  SpO2: 99% (23 Jun 2023 12:00) (96% - 100%)    Parameters below as of 23 Jun 2023 12:00  Patient On (Oxygen Delivery Method): tracheostomy collar  O2 Flow (L/min): 10  O2 Concentration (%): 40    GENERAL: NAD, resting comfortably in bed, AxOx3, follows commands  HEENT: NCAT, MMM, phonates clearly, cuff deflated, on trach collar, minimal secretions.  C/V: Normal rate, normal peripheral perfusion, systolic murmur  PULM: Nonlabored breathing, no respiratory distress, CTA B/L  ABD: Soft, ND, mild TTP controlled, no rebound tenderness, no guarding, drains with SS and dark output, incisions CDI  EXTREM: WWP, 2+ dependent edema--improved from prior, SCDs in place  NEURO: No focal deficits    LABS:                        7.8    15.61 )-----------( 657      ( 23 Jun 2023 07:14 )             25.1     06-23    142  |  107  |  24<H>  ----------------------------<  103<H>  4.2   |  28  |  0.53    Ca    8.5      23 Jun 2023 09:20  Phos  4.5     06-23  Mg     2.2     06-23    Urinalysis Basic - ( 23 Jun 2023 09:20 )    Color: x / Appearance: x / SG: x / pH: x  Gluc: 103 mg/dL / Ketone: x  / Bili: x / Urobili: x   Blood: x / Protein: x / Nitrite: x   Leuk Esterase: x / RBC: x / WBC x   Sq Epi: x / Non Sq Epi: x / Bacteria: x

## 2023-06-23 NOTE — PROGRESS NOTE ADULT - SUBJECTIVE AND OBJECTIVE BOX
1. 16 Fr teal LUQ peripancreatic abscess "Left Pancreatic CODIE Drain" placed on 5/24 (attached to gravity bag):   -15 cc thick purulent output in 24 hrs. 20 cc serosanguinous output in 24 hrs.    2. 16 Fr teal LUQ hematoma "Left Abdominal Hematoma CODIE Drain" placed on 5/25 and upsized on 5/26:   -10 cc serosanguinous output in 24 hrs. 0 cc serosanguinous output in 24 hrs.  Both flushed easily with 5 cc NS.

## 2023-06-23 NOTE — PROGRESS NOTE ADULT - ASSESSMENT
53M w PMHx of HTN, type A aortic dissection s/p Dacron grafts, AV resuspension in 2013, CAD s/p CABG x 1 SVG to RCA (5/2013 with Dr. Medina), seizure disorder, recent admission 3/20-4/14 for replacement of transverse aortic arch, second stage TEVAR and aorto-axillary bypass, AV replacement (bio 23mm), and CABG x 1 (SVG-RCA). Pt found to have endo leak and taken to OR for TEVAR revision on 5/5. Post op course c/b Klebsiella bacteremia (5/15), resp failure requiring intubation on 5/18, Mucus plugging s/p Bronch 5/19 and PEA arrest. Post operatively pt also found to have hemorrhagic pancreatitis with venu-pancreatic abscess possibly 2* to Depakote therefore s/p IR aspiration of peripancreatic fluid collection and paracentesis on 5/22, IR venu-pancreatic abscess drainage and placement of drainage catheter on 5/24. Pt then transferred from CTICU to SICU for septic shock, and further mgmt of hemorrhagic pancreatitis on 5/25. s/p IR placement of 2 new drainage catheters and catheter exchange on 5/26. LUQ drainage 5/30. OR 5/31 for exlap washout & replacement of 3 new JPs and abthera vac with open abdomen. RTOR 6/1, no bleeding, evac of old blood, placement of feeding J-tube. failed extubation s/p trach 6/5 with pneumonia.    NEURO: Pain control: wean OxyIR 15q6h with dilaudid 1mg q4 PRN, off ketamine (6/14). Hx seizures: epilepsy recs appreciated, Cont vimpat. Depakote weaned off due to concerns for drug-related hemorrhagic pancreatitis. Repeat EEG (ordered).  HEENT: Artificial tears, Torticollis - PT/OT following, improving.   CV: s/p PEA arrest on 5/24 night. TTE (6/14) LVEF 75%, LVH, biatrial enlargement and elevated PA pressure (elevated from previous), mild to mod MR/TR . C/w ASA 81mg. Metoprolol 2.5q6. Hold diuresis today.  PULM: ARDS resolved - off NO, s/p multiple Mucus plug/ Lung collapse s/p bronch x3 (most recently on 5/26) most likely from outward obstruction: Cont rotating pt around the clock. Cont Duonebs, 3% saline. failed extubation 6/3: s/p Trach 6/5: Tolerating 40% Trach Collar, SLP placed passy sindi valve.   GI/FEN: TF Vital 1.0 at 70cc via feeding J-tube with imodium 2mg BID, Protonix BID, Hemorrhagic pancreatitis: s/p multiple drain placements and paracentisis by IR team. Cdiff negative (6/19).  : AKF s/p CVVHD & iHD, now w/ renal recovery, off dialysis and aquaphoresis, voids via condom catheter. Multiple missed voids this AM.  HEME: Acute blood loss anemia requiring multiple transfusions  ENDO: mISS  ID: Recurrent fevers with Recent CT A/P without significant intra-abdominal pathology, likely respiratory etiology given secretions and Sputum growing kleb and enterobacter on 6/17. Now off antibiotics: last Caspo (6/18-20, Erta (6/18-23), vanc (6/18-21). If febrile, would need to do fever work-up and discuss w ID re: restarting ABX.  PRIOR ABX: Ertapenem (6/13-6/16), meropenem (6/9-6/13), vanco x 1 (6/9, 6/18-6/22), caspofungin (6/9-6/12, 6/18-6/21), previous Kleb bacteremia from 5/15 & 5/17, subsequent bld cx negative, PNA w/ bronch cx kleb, enterobacter (zosyn, erta resistant), E faecalis, strep angionosus from 5/19, pancreatic abscess cx pan-sensitive kleb 5/22. completed Ceftriaxone( 6/5- 6/15) Chloe (5/21-6/5),  Caspo (5/23-5/30), Ampicillin (5/21- 5/27).   PPX: SCDs, SQH  LINES: PIVs // L rad kalpana (5/31-6/15), 5Fr PIV in LUE (6/13-15), Lsubclav HD Cath (5/31-6/12), RIJ TLC (5/22-5/27), RIJ HD cath (5/22-31), R Ax kalpana(5/12-5/31), LIJ TLC (5/23-6/1), RIJ TLC (6/1-13)   WOUNDS/DRAINS: Right OR CODIE, 2 left OR CODIE, left IR hematoma drain (bilious), left IR pancreatic drain.  PT: PT/OT ordered 6/5. Out of bed to stretcher chair  Dispo: Telemetry

## 2023-06-24 LAB
ANION GAP SERPL CALC-SCNC: 11 MMOL/L — SIGNIFICANT CHANGE UP (ref 5–17)
APPEARANCE UR: CLEAR — SIGNIFICANT CHANGE UP
BACTERIA # UR AUTO: PRESENT /HPF
BILIRUB UR-MCNC: NEGATIVE — SIGNIFICANT CHANGE UP
BLD GP AB SCN SERPL QL: NEGATIVE — SIGNIFICANT CHANGE UP
BUN SERPL-MCNC: 20 MG/DL — SIGNIFICANT CHANGE UP (ref 7–23)
CALCIUM SERPL-MCNC: 7.9 MG/DL — LOW (ref 8.4–10.5)
CHLORIDE SERPL-SCNC: 107 MMOL/L — SIGNIFICANT CHANGE UP (ref 96–108)
CO2 SERPL-SCNC: 22 MMOL/L — SIGNIFICANT CHANGE UP (ref 22–31)
COLOR SPEC: YELLOW — SIGNIFICANT CHANGE UP
COMMENT - URINE: SIGNIFICANT CHANGE UP
CREAT SERPL-MCNC: 0.55 MG/DL — SIGNIFICANT CHANGE UP (ref 0.5–1.3)
CULTURE RESULTS: SIGNIFICANT CHANGE UP
DIFF PNL FLD: NEGATIVE — SIGNIFICANT CHANGE UP
EGFR: 118 ML/MIN/1.73M2 — SIGNIFICANT CHANGE UP
EPI CELLS # UR: SIGNIFICANT CHANGE UP /HPF (ref 0–5)
GLUCOSE BLDC GLUCOMTR-MCNC: 104 MG/DL — HIGH (ref 70–99)
GLUCOSE BLDC GLUCOMTR-MCNC: 108 MG/DL — HIGH (ref 70–99)
GLUCOSE BLDC GLUCOMTR-MCNC: 91 MG/DL — SIGNIFICANT CHANGE UP (ref 70–99)
GLUCOSE SERPL-MCNC: 86 MG/DL — SIGNIFICANT CHANGE UP (ref 70–99)
GLUCOSE UR QL: NEGATIVE — SIGNIFICANT CHANGE UP
HCT VFR BLD CALC: 27.7 % — LOW (ref 39–50)
HGB BLD-MCNC: 8.5 G/DL — LOW (ref 13–17)
HYALINE CASTS # UR AUTO: SIGNIFICANT CHANGE UP /LPF (ref 0–2)
KETONES UR-MCNC: NEGATIVE — SIGNIFICANT CHANGE UP
LEUKOCYTE ESTERASE UR-ACNC: NEGATIVE — SIGNIFICANT CHANGE UP
MAGNESIUM SERPL-MCNC: 2.2 MG/DL — SIGNIFICANT CHANGE UP (ref 1.6–2.6)
MCHC RBC-ENTMCNC: 29.5 PG — SIGNIFICANT CHANGE UP (ref 27–34)
MCHC RBC-ENTMCNC: 30.7 GM/DL — LOW (ref 32–36)
MCV RBC AUTO: 96.2 FL — SIGNIFICANT CHANGE UP (ref 80–100)
NITRITE UR-MCNC: NEGATIVE — SIGNIFICANT CHANGE UP
NRBC # BLD: 0 /100 WBCS — SIGNIFICANT CHANGE UP (ref 0–0)
PH UR: 5.5 — SIGNIFICANT CHANGE UP (ref 5–8)
PHOSPHATE SERPL-MCNC: 4.2 MG/DL — SIGNIFICANT CHANGE UP (ref 2.5–4.5)
PLATELET # BLD AUTO: 626 K/UL — HIGH (ref 150–400)
POTASSIUM SERPL-MCNC: 4.4 MMOL/L — SIGNIFICANT CHANGE UP (ref 3.5–5.3)
POTASSIUM SERPL-SCNC: 4.4 MMOL/L — SIGNIFICANT CHANGE UP (ref 3.5–5.3)
PROT UR-MCNC: 100 MG/DL
RBC # BLD: 2.88 M/UL — LOW (ref 4.2–5.8)
RBC # FLD: 16.6 % — HIGH (ref 10.3–14.5)
RBC CASTS # UR COMP ASSIST: < 5 /HPF — SIGNIFICANT CHANGE UP
RH IG SCN BLD-IMP: POSITIVE — SIGNIFICANT CHANGE UP
SODIUM SERPL-SCNC: 140 MMOL/L — SIGNIFICANT CHANGE UP (ref 135–145)
SP GR SPEC: 1.02 — SIGNIFICANT CHANGE UP (ref 1–1.03)
SPECIMEN SOURCE: SIGNIFICANT CHANGE UP
UROBILINOGEN FLD QL: 0.2 E.U./DL — SIGNIFICANT CHANGE UP
WBC # BLD: 14.7 K/UL — HIGH (ref 3.8–10.5)
WBC # FLD AUTO: 14.7 K/UL — HIGH (ref 3.8–10.5)
WBC UR QL: ABNORMAL /HPF

## 2023-06-24 PROCEDURE — 99233 SBSQ HOSP IP/OBS HIGH 50: CPT

## 2023-06-24 PROCEDURE — 71045 X-RAY EXAM CHEST 1 VIEW: CPT | Mod: 26

## 2023-06-24 RX ADMIN — HEPARIN SODIUM 5000 UNIT(S): 5000 INJECTION INTRAVENOUS; SUBCUTANEOUS at 06:53

## 2023-06-24 RX ADMIN — Medication 975 MILLIGRAM(S): at 00:07

## 2023-06-24 RX ADMIN — OXYCODONE HYDROCHLORIDE 15 MILLIGRAM(S): 5 TABLET ORAL at 18:13

## 2023-06-24 RX ADMIN — Medication 975 MILLIGRAM(S): at 13:30

## 2023-06-24 RX ADMIN — Medication 2.5 MILLIGRAM(S): at 12:49

## 2023-06-24 RX ADMIN — Medication 81 MILLIGRAM(S): at 12:48

## 2023-06-24 RX ADMIN — Medication 3 MILLILITER(S): at 12:50

## 2023-06-24 RX ADMIN — CHLORHEXIDINE GLUCONATE 15 MILLILITER(S): 213 SOLUTION TOPICAL at 06:54

## 2023-06-24 RX ADMIN — HEPARIN SODIUM 5000 UNIT(S): 5000 INJECTION INTRAVENOUS; SUBCUTANEOUS at 13:04

## 2023-06-24 RX ADMIN — Medication 975 MILLIGRAM(S): at 18:15

## 2023-06-24 RX ADMIN — Medication 2 MILLIGRAM(S): at 14:04

## 2023-06-24 RX ADMIN — Medication 2 MILLIGRAM(S): at 22:53

## 2023-06-24 RX ADMIN — Medication 975 MILLIGRAM(S): at 12:49

## 2023-06-24 RX ADMIN — OXYCODONE HYDROCHLORIDE 15 MILLIGRAM(S): 5 TABLET ORAL at 14:04

## 2023-06-24 RX ADMIN — Medication 1 DROP(S): at 06:53

## 2023-06-24 RX ADMIN — SODIUM CHLORIDE 4 MILLILITER(S): 9 INJECTION INTRAMUSCULAR; INTRAVENOUS; SUBCUTANEOUS at 12:50

## 2023-06-24 RX ADMIN — Medication 15 MILLILITER(S): at 12:48

## 2023-06-24 RX ADMIN — Medication 2 MILLIGRAM(S): at 06:55

## 2023-06-24 RX ADMIN — OXYCODONE HYDROCHLORIDE 15 MILLIGRAM(S): 5 TABLET ORAL at 00:07

## 2023-06-24 RX ADMIN — SODIUM CHLORIDE 4 MILLILITER(S): 9 INJECTION INTRAMUSCULAR; INTRAVENOUS; SUBCUTANEOUS at 06:54

## 2023-06-24 RX ADMIN — Medication 975 MILLIGRAM(S): at 06:54

## 2023-06-24 RX ADMIN — PANTOPRAZOLE SODIUM 40 MILLIGRAM(S): 20 TABLET, DELAYED RELEASE ORAL at 12:49

## 2023-06-24 RX ADMIN — Medication 2.5 MILLIGRAM(S): at 06:53

## 2023-06-24 RX ADMIN — OXYCODONE HYDROCHLORIDE 15 MILLIGRAM(S): 5 TABLET ORAL at 06:54

## 2023-06-24 RX ADMIN — OXYCODONE HYDROCHLORIDE 15 MILLIGRAM(S): 5 TABLET ORAL at 08:01

## 2023-06-24 RX ADMIN — OXYCODONE HYDROCHLORIDE 15 MILLIGRAM(S): 5 TABLET ORAL at 12:50

## 2023-06-24 RX ADMIN — Medication 975 MILLIGRAM(S): at 08:01

## 2023-06-24 RX ADMIN — Medication 975 MILLIGRAM(S): at 18:45

## 2023-06-24 RX ADMIN — CHLORHEXIDINE GLUCONATE 15 MILLILITER(S): 213 SOLUTION TOPICAL at 18:17

## 2023-06-24 RX ADMIN — OXYCODONE HYDROCHLORIDE 15 MILLIGRAM(S): 5 TABLET ORAL at 18:45

## 2023-06-24 RX ADMIN — Medication 3 MILLILITER(S): at 18:16

## 2023-06-24 RX ADMIN — LACOSAMIDE 250 MILLIGRAM(S): 50 TABLET ORAL at 18:13

## 2023-06-24 RX ADMIN — Medication 1 DROP(S): at 18:16

## 2023-06-24 RX ADMIN — LACOSAMIDE 250 MILLIGRAM(S): 50 TABLET ORAL at 06:55

## 2023-06-24 RX ADMIN — HEPARIN SODIUM 5000 UNIT(S): 5000 INJECTION INTRAVENOUS; SUBCUTANEOUS at 22:54

## 2023-06-24 RX ADMIN — SODIUM CHLORIDE 4 MILLILITER(S): 9 INJECTION INTRAMUSCULAR; INTRAVENOUS; SUBCUTANEOUS at 18:16

## 2023-06-24 RX ADMIN — Medication 2.5 MILLIGRAM(S): at 18:16

## 2023-06-24 RX ADMIN — Medication 3 MILLILITER(S): at 06:54

## 2023-06-24 NOTE — PROGRESS NOTE ADULT - ASSESSMENT
53M w PMHx of HTN, type A aortic dissection s/p Dacron grafts, AV resuspension in 2013, CAD s/p CABG x 1 SVG to RCA (5/2013 with Dr. Medina), seizure disorder, recent admission 3/20-4/14 for replacement of transverse aortic arch, second stage TEVAR and aorto-axillary bypass, AV replacement (bio 23mm), and CABG x 1 (SVG-RCA). Pt found to have endo leak and taken to OR for TEVAR revision on 5/5. Post op course c/b Klebsiella bacteremia (5/15), resp failure requiring intubation on 5/18, Mucus plugging s/p Bronch 5/19 and PEA arrest. Post operatively pt also found to have hemorrhagic pancreatitis with venu-pancreatic abscess possibly 2* to Depakote therefore s/p IR aspiration of peripancreatic fluid collection and paracentesis on 5/22, IR venu-pancreatic abscess drainage and placement of drainage catheter on 5/24. Pt then transferred from CTICU to SICU for septic shock, and further mgmt of hemorrhagic pancreatitis on 5/25. s/p IR placement of 2 new drainage catheters and catheter exchange on 5/26. LUQ drainage 5/30. OR 5/31 for exlap washout & replacement of 3 new JPs and abthera vac with open abdomen. RTOR 6/1, no bleeding, evac of old blood, placement of feeding J-tube. failed extubation s/p trach 6/5 with pneumonia.    pain/nausea control  neuro consult for ams  restraints while pulling at drains  frequent reorientation  home meds as appropriate  trach collar  tube feeds  condom cath  dc rectal tube  dc rlq drain (low output)

## 2023-06-24 NOTE — PROGRESS NOTE ADULT - ASSESSMENT
53 year-old male with PMHx HTN, type A aortic dissection s/p Dacron grafts, AV resuspension in 2013, CAD s/p CABG x 1 SVG to RCA (5/2013 with Dr. Medina), seizure disorder, recent admission 3/20-4/14 for replacement of transverse aortic arch, second stage TEVAR 5/5 with course c/b peripancreatic/RP hemorrhage/hematoma formation s/p multiple washouts on antibiotics. Epilepsy team initially consulted for suspected seizure on 5/12, thought to be provoked. Additional event on 5/14/23 that did not correlate with findings on EEG recordings. Vimpat was increased and he was tapered off Depakote previously (Originally on LCM 100mg BID and Divalproex 1000mg BID) due to concerns for possible drug-related hemorrhagic pancreatitis, however, seems less likely and currently pancreatitis is thought to be secondary to cholecystitis. CTH from 5/18 without acute pathology. Neurology was reconsulted for new AMS/ agitation and an episode of patient pulling the trach.    Recs:  - start on vEEG   - dec vimpat to 200mg BID  - would consider restarting divalproex if no concern for increased risk for bleeding and hence a clearance from primary team   - Ativan 2mg IVP for seizures > 2mins  - Keep Mg>2 and K >4.   - Rest of management per primary team  - call for new or worsening neuro symptoms   53 year-old male with PMHx HTN, type A aortic dissection s/p Dacron grafts, AV resuspension in 2013, CAD s/p CABG x 1 SVG to RCA (5/2013 with Dr. Medina), seizure disorder, recent admission 3/20-4/14 for replacement of transverse aortic arch, second stage TEVAR 5/5 with course c/b peripancreatic/RP hemorrhage/hematoma formation s/p multiple washouts on antibiotics. Epilepsy team initially consulted for suspected seizure on 5/12, thought to be provoked. Additional event on 5/14/23 that did not correlate with findings on EEG recordings. Vimpat was increased and he was tapered off Depakote previously (Originally on LCM 100mg BID and Divalproex 1000mg BID) due to concerns for possible drug-related hemorrhagic pancreatitis, however, seems less likely and currently pancreatitis is thought to be secondary to cholecystitis. CTH from 5/18 without acute pathology. Neurology was reconsulted for new AMS/ agitation and an episode of patient pulling the trach.    Recs:  - restart on vEEG to evaluate need for higher dose of lacosamide during down-titration.   - dec vimpat to 200mg BID  - would consider restarting divalproex if no concern for increased risk for bleeding and hence a clearance from primary team   - Ativan 2mg IVP for seizures > 2mins  - Keep Mg>2 and K >4.   - Rest of management per primary team  - call for new or worsening neuro symptoms

## 2023-06-24 NOTE — PROGRESS NOTE ADULT - ATTENDING COMMENTS
Pt awakening more but appeared agitated.  Question of lacosamide toxicity, though would cause ataxia and slurred speech etc more than agitation.  However also there was rapid taper off VPA which is used as a mood stabilizer, so missing this can contribute, however VPA also can contribute to reduced blood clotting due to effects on number and function of platelets.    Will reduce lacosamide to see if it is required (outpt dose was 100mg bid)  consider restarting VPA at low dose when bleeding issues are less acute.

## 2023-06-24 NOTE — PROGRESS NOTE ADULT - SUBJECTIVE AND OBJECTIVE BOX
STATUS POST:  Second stage thoracic endovascular aortic repair (TEVAR)    Bronchoscopy    Exploratory laparotomy    Jejunostomy feeding tube placement    EGD    Percutaneous needle tracheostomy      SUBJECTIVE:   Patient seen and examined on am rounds. opens eyes to commands, but otherwise uncooperative with interview.    Vital Signs Last 24 Hrs  T(C): 36.2 (24 Jun 2023 09:00), Max: 37.6 (24 Jun 2023 04:55)  T(F): 97.2 (24 Jun 2023 09:00), Max: 99.7 (24 Jun 2023 04:55)  HR: 108 (24 Jun 2023 08:47) (85 - 108)  BP: 147/79 (24 Jun 2023 08:47) (125/73 - 149/81)  BP(mean): 108 (24 Jun 2023 08:47) (91 - 108)  RR: 17 (24 Jun 2023 08:47) (13 - 21)  SpO2: 99% (24 Jun 2023 08:47) (99% - 100%)    Parameters below as of 24 Jun 2023 08:47  Patient On (Oxygen Delivery Method): tracheostomy collar  O2 Flow (L/min): 10  O2 Concentration (%): 40    I&O's Summary    23 Jun 2023 07:01  -  24 Jun 2023 07:00  --------------------------------------------------------  IN: 1610 mL / OUT: 1379 mL / NET: 231 mL        Physical Exam:  General Appearance: Appears well, NAD  Pulmonary: Nonlabored breathing, no respiratory distress  Cardiovascular: NSR  Abdomen: Soft, nondistended, nontender. Multiple CODIE drains. Rectal tube with output.  Extremities: WWP, SCD's in place     LABS:                        8.5    14.70 )-----------( 626      ( 24 Jun 2023 07:13 )             27.7     06-24    140  |  107  |  20  ----------------------------<  86  4.4   |  22  |  0.55    Ca    7.9<L>      24 Jun 2023 07:13  Phos  4.2     06-24  Mg     2.2     06-24        Urinalysis Basic - ( 24 Jun 2023 07:13 )    Color: x / Appearance: x / SG: x / pH: x  Gluc: 86 mg/dL / Ketone: x  / Bili: x / Urobili: x   Blood: x / Protein: x / Nitrite: x   Leuk Esterase: x / RBC: x / WBC x   Sq Epi: x / Non Sq Epi: x / Bacteria: x

## 2023-06-24 NOTE — PROGRESS NOTE ADULT - SUBJECTIVE AND OBJECTIVE BOX
Neurology Progress Note    Interval History:  Patient was seen and examined at bedside. He was resting comfortably but did not appear to be in distress. He open his eyes initially upon calling his name but then closed his eyes again and did not participate in exam any further.       Medications:  acetaminophen   Oral Liquid .. 975 milliGRAM(s) Enteral Tube every 6 hours  albuterol/ipratropium for Nebulization 3 milliLiter(s) Nebulizer every 6 hours  artificial  tears Solution 1 Drop(s) Both EYES two times a day  aspirin  chewable 81 milliGRAM(s) Enteral Tube every 24 hours  chlorhexidine 0.12% Liquid 15 milliLiter(s) Oral Mucosa every 12 hours  dextrose 5%. 1000 milliLiter(s) IV Continuous <Continuous>  dextrose 50% Injectable 25 Gram(s) IV Push once  dextrose Oral Gel 15 Gram(s) Oral once PRN  ertapenem  IVPB 1000 milliGRAM(s) IV Intermittent every 24 hours  glucagon  Injectable 1 milliGRAM(s) IntraMuscular once  heparin   Injectable 5000 Unit(s) SubCutaneous every 8 hours  HYDROmorphone  Injectable 1 milliGRAM(s) IV Push every 4 hours PRN  insulin lispro (ADMELOG) corrective regimen sliding scale   SubCutaneous every 6 hours  lacosamide Solution 250 milliGRAM(s) Oral two times a day  loperamide Liquid 2 milliGRAM(s) Enteral Tube every 8 hours  metoprolol tartrate Injectable 2.5 milliGRAM(s) IV Push every 6 hours  multivitamin/minerals/iron Oral Solution (CENTRUM) 15 milliLiter(s) Oral daily  oxyCODONE    Solution 15 milliGRAM(s) Oral every 6 hours  pantoprazole  Injectable 40 milliGRAM(s) IV Push daily  sodium chloride 0.9% lock flush 10 milliLiter(s) IV Push every 1 hour PRN  sodium chloride 3%  Inhalation 4 milliLiter(s) Inhalation every 6 hours      Vital Signs Last 24 Hrs  T(C): 36.5 (24 Jun 2023 14:00), Max: 37.6 (24 Jun 2023 04:55)  T(F): 97.7 (24 Jun 2023 14:00), Max: 99.7 (24 Jun 2023 04:55)  HR: 94 (24 Jun 2023 12:50) (88 - 108)  BP: 149/83 (24 Jun 2023 12:50) (125/73 - 149/83)  BP(mean): 110 (24 Jun 2023 12:50) (91 - 110)  RR: 18 (24 Jun 2023 12:50) (13 - 18)  SpO2: 99% (24 Jun 2023 12:50) (99% - 100%)    Parameters below as of 24 Jun 2023 12:50  Patient On (Oxygen Delivery Method): tracheostomy collar  O2 Flow (L/min): 10  O2 Concentration (%): 40    Neurological Examination:  General:  Appearance is consistent with chronologic age.   Cognitive/Language:  unable to access   Cranial Nerves  - Eyes: pt did not participate in exam   - Face:  unable to   - Ears/Nose/Throat:  unable to access   Motor examination:  unable to access   Sensory examination:  unable to access   Reflexes:  1+ b/l patellar, biceps   Cerebellum:  unable to access     Labs:  CBC Full  -  ( 24 Jun 2023 07:13 )  WBC Count : 14.70 K/uL  RBC Count : 2.88 M/uL  Hemoglobin : 8.5 g/dL  Hematocrit : 27.7 %  Platelet Count - Automated : 626 K/uL  Mean Cell Volume : 96.2 fl  Mean Cell Hemoglobin : 29.5 pg  Mean Cell Hemoglobin Concentration : 30.7 gm/dL  Auto Neutrophil # : x  Auto Lymphocyte # : x  Auto Monocyte # : x  Auto Eosinophil # : x  Auto Basophil # : x  Auto Neutrophil % : x  Auto Lymphocyte % : x  Auto Monocyte % : x  Auto Eosinophil % : x  Auto Basophil % : x    06-24    140  |  107  |  20  ----------------------------<  86  4.4   |  22  |  0.55    Ca    7.9<L>      24 Jun 2023 07:13  Phos  4.2     06-24  Mg     2.2     06-24          Urinalysis Basic - ( 24 Jun 2023 07:13 )    Color: x / Appearance: x / SG: x / pH: x  Gluc: 86 mg/dL / Ketone: x  / Bili: x / Urobili: x   Blood: x / Protein: x / Nitrite: x   Leuk Esterase: x / RBC: x / WBC x   Sq Epi: x / Non Sq Epi: x / Bacteria: x

## 2023-06-24 NOTE — CHART NOTE - NSCHARTNOTEFT_GEN_A_CORE
Notified by team that patient remove tracheostomy tube. Arrived to find patient in no apparent distress. States he pulled tracheostomy out by mistake. On exam tachycardic to 101, normotensive, satting at 96% on trach collar. Stylet apparent in trach track. Shiley replaced into track, cuff remains down. Patient denies dyspnea following replacement. Observed at bedside for 10 min without change in respiratory status, satting 100%. Plan for CXR and follow up per primary team

## 2023-06-25 LAB
ANION GAP SERPL CALC-SCNC: 7 MMOL/L — SIGNIFICANT CHANGE UP (ref 5–17)
BUN SERPL-MCNC: 18 MG/DL — SIGNIFICANT CHANGE UP (ref 7–23)
CALCIUM SERPL-MCNC: 8.1 MG/DL — LOW (ref 8.4–10.5)
CHLORIDE SERPL-SCNC: 110 MMOL/L — HIGH (ref 96–108)
CO2 SERPL-SCNC: 27 MMOL/L — SIGNIFICANT CHANGE UP (ref 22–31)
CREAT SERPL-MCNC: 0.52 MG/DL — SIGNIFICANT CHANGE UP (ref 0.5–1.3)
EGFR: 120 ML/MIN/1.73M2 — SIGNIFICANT CHANGE UP
GLUCOSE BLDC GLUCOMTR-MCNC: 108 MG/DL — HIGH (ref 70–99)
GLUCOSE BLDC GLUCOMTR-MCNC: 87 MG/DL — SIGNIFICANT CHANGE UP (ref 70–99)
GLUCOSE BLDC GLUCOMTR-MCNC: 90 MG/DL — SIGNIFICANT CHANGE UP (ref 70–99)
GLUCOSE BLDC GLUCOMTR-MCNC: 94 MG/DL — SIGNIFICANT CHANGE UP (ref 70–99)
GLUCOSE BLDC GLUCOMTR-MCNC: 97 MG/DL — SIGNIFICANT CHANGE UP (ref 70–99)
GLUCOSE SERPL-MCNC: 104 MG/DL — HIGH (ref 70–99)
HCT VFR BLD CALC: 23.2 % — LOW (ref 39–50)
HGB BLD-MCNC: 7.1 G/DL — LOW (ref 13–17)
MAGNESIUM SERPL-MCNC: 2.1 MG/DL — SIGNIFICANT CHANGE UP (ref 1.6–2.6)
MCHC RBC-ENTMCNC: 29.3 PG — SIGNIFICANT CHANGE UP (ref 27–34)
MCHC RBC-ENTMCNC: 30.6 GM/DL — LOW (ref 32–36)
MCV RBC AUTO: 95.9 FL — SIGNIFICANT CHANGE UP (ref 80–100)
NRBC # BLD: 0 /100 WBCS — SIGNIFICANT CHANGE UP (ref 0–0)
PHOSPHATE SERPL-MCNC: 4.4 MG/DL — SIGNIFICANT CHANGE UP (ref 2.5–4.5)
PLATELET # BLD AUTO: 585 K/UL — HIGH (ref 150–400)
POTASSIUM SERPL-MCNC: 4 MMOL/L — SIGNIFICANT CHANGE UP (ref 3.5–5.3)
POTASSIUM SERPL-SCNC: 4 MMOL/L — SIGNIFICANT CHANGE UP (ref 3.5–5.3)
RBC # BLD: 2.42 M/UL — LOW (ref 4.2–5.8)
RBC # FLD: 17 % — HIGH (ref 10.3–14.5)
SODIUM SERPL-SCNC: 144 MMOL/L — SIGNIFICANT CHANGE UP (ref 135–145)
WBC # BLD: 15.5 K/UL — HIGH (ref 3.8–10.5)
WBC # FLD AUTO: 15.5 K/UL — HIGH (ref 3.8–10.5)

## 2023-06-25 PROCEDURE — 99233 SBSQ HOSP IP/OBS HIGH 50: CPT

## 2023-06-25 PROCEDURE — 74177 CT ABD & PELVIS W/CONTRAST: CPT | Mod: 26

## 2023-06-25 RX ORDER — HEPARIN SODIUM 5000 [USP'U]/ML
5000 INJECTION INTRAVENOUS; SUBCUTANEOUS ONCE
Refills: 0 | Status: COMPLETED | OUTPATIENT
Start: 2023-06-25 | End: 2023-06-25

## 2023-06-25 RX ORDER — DIATRIZOATE MEGLUMINE 180 MG/ML
30 INJECTION, SOLUTION INTRAVESICAL ONCE
Refills: 0 | Status: COMPLETED | OUTPATIENT
Start: 2023-06-25 | End: 2023-06-25

## 2023-06-25 RX ORDER — LACOSAMIDE 50 MG/1
200 TABLET ORAL
Refills: 0 | Status: DISCONTINUED | OUTPATIENT
Start: 2023-06-25 | End: 2023-07-17

## 2023-06-25 RX ADMIN — Medication 1 DROP(S): at 18:04

## 2023-06-25 RX ADMIN — CHLORHEXIDINE GLUCONATE 15 MILLILITER(S): 213 SOLUTION TOPICAL at 06:57

## 2023-06-25 RX ADMIN — Medication 2 MILLIGRAM(S): at 21:31

## 2023-06-25 RX ADMIN — HEPARIN SODIUM 5000 UNIT(S): 5000 INJECTION INTRAVENOUS; SUBCUTANEOUS at 21:30

## 2023-06-25 RX ADMIN — PANTOPRAZOLE SODIUM 40 MILLIGRAM(S): 20 TABLET, DELAYED RELEASE ORAL at 12:03

## 2023-06-25 RX ADMIN — OXYCODONE HYDROCHLORIDE 15 MILLIGRAM(S): 5 TABLET ORAL at 18:02

## 2023-06-25 RX ADMIN — Medication 3 MILLILITER(S): at 18:03

## 2023-06-25 RX ADMIN — OXYCODONE HYDROCHLORIDE 15 MILLIGRAM(S): 5 TABLET ORAL at 01:00

## 2023-06-25 RX ADMIN — DIATRIZOATE MEGLUMINE 30 MILLILITER(S): 180 INJECTION, SOLUTION INTRAVESICAL at 12:45

## 2023-06-25 RX ADMIN — SODIUM CHLORIDE 4 MILLILITER(S): 9 INJECTION INTRAMUSCULAR; INTRAVENOUS; SUBCUTANEOUS at 18:03

## 2023-06-25 RX ADMIN — Medication 15 MILLILITER(S): at 12:44

## 2023-06-25 RX ADMIN — Medication 975 MILLIGRAM(S): at 01:00

## 2023-06-25 RX ADMIN — ERTAPENEM SODIUM 120 MILLIGRAM(S): 1 INJECTION, POWDER, LYOPHILIZED, FOR SOLUTION INTRAMUSCULAR; INTRAVENOUS at 00:32

## 2023-06-25 RX ADMIN — OXYCODONE HYDROCHLORIDE 15 MILLIGRAM(S): 5 TABLET ORAL at 12:01

## 2023-06-25 RX ADMIN — OXYCODONE HYDROCHLORIDE 15 MILLIGRAM(S): 5 TABLET ORAL at 06:57

## 2023-06-25 RX ADMIN — Medication 2.5 MILLIGRAM(S): at 23:25

## 2023-06-25 RX ADMIN — CHLORHEXIDINE GLUCONATE 15 MILLILITER(S): 213 SOLUTION TOPICAL at 19:29

## 2023-06-25 RX ADMIN — Medication 2.5 MILLIGRAM(S): at 00:36

## 2023-06-25 RX ADMIN — Medication 975 MILLIGRAM(S): at 00:32

## 2023-06-25 RX ADMIN — Medication 975 MILLIGRAM(S): at 12:03

## 2023-06-25 RX ADMIN — OXYCODONE HYDROCHLORIDE 15 MILLIGRAM(S): 5 TABLET ORAL at 00:37

## 2023-06-25 RX ADMIN — Medication 3 MILLILITER(S): at 23:24

## 2023-06-25 RX ADMIN — Medication 975 MILLIGRAM(S): at 19:37

## 2023-06-25 RX ADMIN — LACOSAMIDE 200 MILLIGRAM(S): 50 TABLET ORAL at 19:29

## 2023-06-25 RX ADMIN — OXYCODONE HYDROCHLORIDE 15 MILLIGRAM(S): 5 TABLET ORAL at 23:25

## 2023-06-25 RX ADMIN — OXYCODONE HYDROCHLORIDE 15 MILLIGRAM(S): 5 TABLET ORAL at 12:23

## 2023-06-25 RX ADMIN — OXYCODONE HYDROCHLORIDE 15 MILLIGRAM(S): 5 TABLET ORAL at 19:37

## 2023-06-25 RX ADMIN — Medication 975 MILLIGRAM(S): at 07:17

## 2023-06-25 RX ADMIN — Medication 81 MILLIGRAM(S): at 12:59

## 2023-06-25 RX ADMIN — Medication 2 MILLIGRAM(S): at 17:00

## 2023-06-25 RX ADMIN — SODIUM CHLORIDE 4 MILLILITER(S): 9 INJECTION INTRAMUSCULAR; INTRAVENOUS; SUBCUTANEOUS at 00:37

## 2023-06-25 RX ADMIN — LACOSAMIDE 250 MILLIGRAM(S): 50 TABLET ORAL at 06:55

## 2023-06-25 RX ADMIN — Medication 3 MILLILITER(S): at 06:57

## 2023-06-25 RX ADMIN — SODIUM CHLORIDE 4 MILLILITER(S): 9 INJECTION INTRAMUSCULAR; INTRAVENOUS; SUBCUTANEOUS at 06:55

## 2023-06-25 RX ADMIN — Medication 2.5 MILLIGRAM(S): at 18:03

## 2023-06-25 RX ADMIN — Medication 975 MILLIGRAM(S): at 23:25

## 2023-06-25 RX ADMIN — Medication 3 MILLILITER(S): at 00:37

## 2023-06-25 RX ADMIN — Medication 3 MILLILITER(S): at 12:03

## 2023-06-25 RX ADMIN — Medication 2.5 MILLIGRAM(S): at 06:56

## 2023-06-25 RX ADMIN — SODIUM CHLORIDE 4 MILLILITER(S): 9 INJECTION INTRAMUSCULAR; INTRAVENOUS; SUBCUTANEOUS at 12:00

## 2023-06-25 RX ADMIN — SODIUM CHLORIDE 4 MILLILITER(S): 9 INJECTION INTRAMUSCULAR; INTRAVENOUS; SUBCUTANEOUS at 23:24

## 2023-06-25 RX ADMIN — OXYCODONE HYDROCHLORIDE 15 MILLIGRAM(S): 5 TABLET ORAL at 07:17

## 2023-06-25 RX ADMIN — Medication 975 MILLIGRAM(S): at 06:55

## 2023-06-25 RX ADMIN — Medication 2.5 MILLIGRAM(S): at 12:05

## 2023-06-25 RX ADMIN — Medication 975 MILLIGRAM(S): at 18:02

## 2023-06-25 RX ADMIN — HEPARIN SODIUM 5000 UNIT(S): 5000 INJECTION INTRAVENOUS; SUBCUTANEOUS at 06:54

## 2023-06-25 RX ADMIN — HEPARIN SODIUM 5000 UNIT(S): 5000 INJECTION INTRAVENOUS; SUBCUTANEOUS at 14:51

## 2023-06-25 RX ADMIN — Medication 2 MILLIGRAM(S): at 06:57

## 2023-06-25 RX ADMIN — Medication 1 DROP(S): at 06:55

## 2023-06-25 RX ADMIN — Medication 975 MILLIGRAM(S): at 12:23

## 2023-06-25 NOTE — PROGRESS NOTE ADULT - ASSESSMENT
53M w PMHx of HTN, type A aortic dissection s/p Dacron grafts, AV resuspension in 2013, CAD s/p CABG x 1 SVG to RCA (5/2013 with Dr. Medina), seizure disorder, recent admission 3/20-4/14 for replacement of transverse aortic arch, second stage TEVAR and aorto-axillary bypass, AV replacement (bio 23mm), and CABG x 1 (SVG-RCA). Pt found to have endo leak and taken to OR for TEVAR revision on 5/5. Post op course c/b Klebsiella bacteremia (5/15), resp failure requiring intubation on 5/18, Mucus plugging s/p Bronch 5/19 and PEA arrest. Post operatively pt also found to have hemorrhagic pancreatitis with venu-pancreatic abscess possibly 2* to Depakote therefore s/p IR aspiration of peripancreatic fluid collection and paracentesis on 5/22, IR venu-pancreatic abscess drainage and placement of drainage catheter on 5/24. Pt then transferred from CTICU to SICU for septic shock, and further mgmt of hemorrhagic pancreatitis on 5/25. s/p IR placement of 2 new drainage catheters and catheter exchange on 5/26. LUQ drainage 5/30. OR 5/31 for exlap washout & replacement of 3 new JPs and abthera vac with open abdomen. RTOR 6/1, no bleeding, evac of old blood, placement of feeding J-tube. failed extubation s/p trach 6/5 with pneumonia.    pain/nausea control  neuro recs  restraints while pulling at drains  frequent reorientation  home meds as appropriate  trach collar  tube feeds  condom cath  Flush IR drains

## 2023-06-25 NOTE — PROGRESS NOTE ADULT - SUBJECTIVE AND OBJECTIVE BOX
STATUS POST:  Second stage thoracic endovascular aortic repair (TEVAR)    Bronchoscopy    Exploratory laparotomy    Jejunostomy feeding tube placement    EGD    Percutaneous needle tracheostomy      SUBJECTIVE: Pt seen and examined by chief resident. Pt is doing well, resting comfortably on bed. Pain controlled. No complaints at this time.    Vital Signs Last 24 Hrs  T(C): 36.6 (25 Jun 2023 09:05), Max: 37.4 (24 Jun 2023 15:59)  T(F): 97.9 (25 Jun 2023 09:05), Max: 99.3 (24 Jun 2023 15:59)  HR: 94 (25 Jun 2023 08:20) (90 - 102)  BP: 138/75 (25 Jun 2023 08:20) (132/75 - 150/81)  BP(mean): 100 (25 Jun 2023 08:20) (98 - 110)  RR: 18 (25 Jun 2023 08:20) (18 - 19)  SpO2: 100% (25 Jun 2023 08:20) (99% - 100%)    Parameters below as of 25 Jun 2023 08:30  Patient On (Oxygen Delivery Method): tracheostomy collar        I&O's Summary    24 Jun 2023 07:01  -  25 Jun 2023 07:00  --------------------------------------------------------  IN: 1610 mL / OUT: 705 mL / NET: 905 mL    25 Jun 2023 07:01  -  25 Jun 2023 10:52  --------------------------------------------------------  IN: 140 mL / OUT: 0 mL / NET: 140 mL      Physical Exam:  General Appearance: Appears well, NAD  Pulmonary: Nonlabored breathing, no respiratory distress  Cardiovascular: NSR  Abdomen: Soft, nondistended, nontender. Multiple CODIE drains.   Extremities: WWP, SCD's in place     LABS:                        7.1    15.50 )-----------( 585      ( 25 Jun 2023 08:32 )             23.2     06-25    144  |  110<H>  |  18  ----------------------------<  104<H>  4.0   |  27  |  0.52    Ca    8.1<L>      25 Jun 2023 08:32  Phos  4.4     06-25  Mg     2.1     06-25        Urinalysis Basic - ( 25 Jun 2023 08:32 )    Color: x / Appearance: x / SG: x / pH: x  Gluc: 104 mg/dL / Ketone: x  / Bili: x / Urobili: x   Blood: x / Protein: x / Nitrite: x   Leuk Esterase: x / RBC: x / WBC x   Sq Epi: x / Non Sq Epi: x / Bacteria: x

## 2023-06-25 NOTE — PROGRESS NOTE ADULT - SUBJECTIVE AND OBJECTIVE BOX
1. 16 Fr teal LUQ peripancreatic abscess "Left Pancreatic CODIE Drain" placed on 5/24 (attached to gravity bag):   -0 cc thick purulent output in 24 hrs. 10 cc serosanguinous output in 24 hrs.    2. 16 Fr teal LUQ hematoma "Left Abdominal Hematoma CODIE Drain" placed on 5/25 and upsized on 5/26:   -45 cc serosanguinous output in 24 hrs. 15 cc serosanguinous output in 24 hrs.  Both flushed easily with 5 cc NS.

## 2023-06-25 NOTE — PROGRESS NOTE ADULT - ATTENDING COMMENTS
Since pt had not had seizure activity since adding lacosamide to VPA, it is possible he only requires lacosmaide monotherapy.  So can attempt standard dose of lacosamide of 200mg bid rather than the 250mg bid, and to keep off the VPA due to both bleeding risks and pancreatitis.    Today pt looked well - speaking through trach    vEEG will be helpful to evaluate need for further medication changes.

## 2023-06-25 NOTE — PROGRESS NOTE ADULT - ASSESSMENT
This is a 53 year old male with PMHx HTN, type A aortic dissection s/p Dacron grafts, AV resuspension in 2013, CAD s/p CABG x 1 SVG to RCA (5/2013 with Dr. Medina), seizure disorder, recent admission 3/20-4/14 for replacement of transverse aortic arch, second stage TEVAR 5/5 with course c/b peripancreatic/RP hemorrhage/hematoma formation s/p multiple washouts on antibiotics. Epilepsy team was initially consulted for suspected seizure on 5/12, which was thought to be provoked. Additional events on 5/14/23 that did not correlate with findings on EEG recordings. Vimpat was increased to 250 mg BID and he was tapered off Depakote previously (Originally on LCM 100mg BID and Divalproex 1000mg BID) due to concerns for possible drug-related hemorrhagic pancreatitis, however, seems less likely and currently pancreatitis is thought to be secondary to cholecystitis. CTH from 5/18 without acute pathology. Neurology was reconsulted for new AMS/agitation and an episode of patient pulling the trach, patient also has been having poor sleep overnight. When examined, patient was more calm appearing and did not appear in any distress.    Plan:  - Restarted on VEEG to evaluate need for higher dose of lacosamide during down-titration  - Recommend to decrease vimpat to 200 mg BID  - Can consider restarting divalproex if no concern for increased risk for bleeding and hence a clearance from primary team  - Seizure & fall precautions  - Keep Mg >2 and K >4, replete PRN  - Can give 2 mg IV Ativan for seizures > 2 mins  - Please call for any changes in patient's clinical or neurological status  - Continue rest of care as per primary team     This is a 53 year old male with PMHx HTN, type A aortic dissection s/p Dacron grafts, AV resuspension in 2013, CAD s/p CABG x 1 SVG to RCA (5/2013 with Dr. Medina), seizure disorder, recent admission 3/20-4/14 for replacement of transverse aortic arch, second stage TEVAR 5/5 with course c/b peripancreatic/RP hemorrhage/hematoma formation s/p multiple washouts on antibiotics. Epilepsy team was initially consulted for suspected seizure on 5/12, which was thought to be provoked. Additional events on 5/14/23 that did not correlate with findings on EEG recordings. Vimpat was increased to 250 mg BID and he was tapered off Depakote previously (Originally on LCM 100mg BID and Divalproex 1000mg BID) due to concerns for possible drug-related hemorrhagic pancreatitis, however, seems less likely and currently pancreatitis is thought to be secondary to cholecystitis. CTH from 5/18 without acute pathology. Neurology was reconsulted for new AMS/agitation and an episode of patient pulling the trach, patient also has been having poor sleep overnight. When examined, patient was more calm appearing and did not appear in any distress.    Wife reports seizures started 'out out of the blue' several years ago.  Did poorly on levetiracetam and was still having seizures on VPA and was in the middle of cross-titration to lacosamide anyway, with next step to 150mg bid.  Had not had seizures outpt while on the current regimen.    Plan:  - Restart on VEEG to evaluate need for higher dose of lacosamide during down-titration  - Recommend to decrease vimpat to 200 mg BID  - Seizure & fall precautions  - Keep Mg >2 and K >4, replete PRN  - Can give 2 mg IV Ativan for seizures > 2 mins  - Please call for any changes in patient's clinical or neurological status  - Continue rest of care as per primary team

## 2023-06-25 NOTE — PROGRESS NOTE ADULT - SUBJECTIVE AND OBJECTIVE BOX
Neurology Progress Note    Interval History:    There were no acute events overnight. Patient states he feels well, denied any complaints.      PAST MEDICAL & SURGICAL HISTORY:  HTN (hypertension)  Aortic dissection  CAD (coronary artery disease)  Seizure disorder  Hypertension  Status post endovascular aneurysm repair (EVAR)  S/P aortic bifurcation bypass graft  S/P CABG x 1      Medications:  acetaminophen   Oral Liquid .. 975 milliGRAM(s) Enteral Tube every 6 hours  albuterol/ipratropium for Nebulization 3 milliLiter(s) Nebulizer every 6 hours  artificial  tears Solution 1 Drop(s) Both EYES two times a day  chlorhexidine 0.12% Liquid 15 milliLiter(s) Oral Mucosa every 12 hours  dextrose 5%. 1000 milliLiter(s) IV Continuous <Continuous>  dextrose 50% Injectable 25 Gram(s) IV Push once  dextrose Oral Gel 15 Gram(s) Oral once PRN  glucagon  Injectable 1 milliGRAM(s) IntraMuscular once  HYDROmorphone  Injectable 1 milliGRAM(s) IV Push every 4 hours PRN  insulin lispro (ADMELOG) corrective regimen sliding scale   SubCutaneous every 6 hours  lacosamide Solution 200 milliGRAM(s) Oral two times a day  loperamide Liquid 2 milliGRAM(s) Enteral Tube every 8 hours  metoprolol tartrate Injectable 2.5 milliGRAM(s) IV Push every 6 hours  multivitamin/minerals/iron Oral Solution (CENTRUM) 15 milliLiter(s) Oral daily  oxyCODONE    Solution 15 milliGRAM(s) Oral every 6 hours  pantoprazole  Injectable 40 milliGRAM(s) IV Push daily  sodium chloride 0.9% lock flush 10 milliLiter(s) IV Push every 1 hour PRN  sodium chloride 3%  Inhalation 4 milliLiter(s) Inhalation every 6 hours      Vital Signs Last 24 Hrs  T(C): 36.7 (25 Jun 2023 22:04), Max: 37.2 (25 Jun 2023 04:37)  T(F): 98 (25 Jun 2023 22:04), Max: 98.9 (25 Jun 2023 04:37)  HR: 86 (25 Jun 2023 21:05) (84 - 102)  BP: 138/67 (25 Jun 2023 20:25) (137/81 - 150/81)  BP(mean): 96 (25 Jun 2023 20:25) (96 - 108)  RR: 18 (25 Jun 2023 21:05) (18 - 18)  SpO2: 100% (25 Jun 2023 21:05) (99% - 100%)    Parameters below as of 25 Jun 2023 21:05  Patient On (Oxygen Delivery Method): tracheostomy collar  O2 Flow (L/min): 10  O2 Concentration (%): 40      Neurological Exam:   Mental status: Awake, alert. +Trach. Follows commands.  Cranial nerves: Pupils equally round and reactive to light, no nystagmus, extraocular muscles intact, V1 through V3 intact bilaterally and symmetric, face symmetric, tongue was midline.  Motor: MRC grading 5/5 B/L UE/LE.  strength 5/5. Normal tone and bulk. No abnormal movements.    Sensation: Intact to light touch in all extremities.  Coordination: No dysmetria on finger-to-nose testing.  Reflexes: 2+ in bilateral UE/LE, downgoing toes bilaterally.  Gait: Deferred.      Labs:  CBC Full  -  ( 25 Jun 2023 08:32 )  WBC Count : 15.50 K/uL  RBC Count : 2.42 M/uL  Hemoglobin : 7.1 g/dL  Hematocrit : 23.2 %  Platelet Count - Automated : 585 K/uL  Mean Cell Volume : 95.9 fl  Mean Cell Hemoglobin : 29.3 pg  Mean Cell Hemoglobin Concentration : 30.6 gm/dL      06-25    144  |  110<H>  |  18  ----------------------------<  104<H>  4.0   |  27  |  0.52    Ca    8.1<L>      25 Jun 2023 08:32  Phos  4.4     06-25  Mg     2.1     06-25      Urinalysis Basic - ( 25 Jun 2023 08:32 )    Color: x / Appearance: x / SG: x / pH: x  Gluc: 104 mg/dL / Ketone: x  / Bili: x / Urobili: x   Blood: x / Protein: x / Nitrite: x   Leuk Esterase: x / RBC: x / WBC x   Sq Epi: x / Non Sq Epi: x / Bacteria: x

## 2023-06-26 LAB
ANION GAP SERPL CALC-SCNC: 7 MMOL/L — SIGNIFICANT CHANGE UP (ref 5–17)
APPEARANCE UR: ABNORMAL
APTT BLD: 38.5 SEC — HIGH (ref 27.5–35.5)
BACTERIA # UR AUTO: SIGNIFICANT CHANGE UP /HPF
BILIRUB UR-MCNC: NEGATIVE — SIGNIFICANT CHANGE UP
BUN SERPL-MCNC: 17 MG/DL — SIGNIFICANT CHANGE UP (ref 7–23)
CALCIUM SERPL-MCNC: 8.3 MG/DL — LOW (ref 8.4–10.5)
CHLORIDE SERPL-SCNC: 109 MMOL/L — HIGH (ref 96–108)
CO2 SERPL-SCNC: 23 MMOL/L — SIGNIFICANT CHANGE UP (ref 22–31)
COLOR SPEC: YELLOW — SIGNIFICANT CHANGE UP
CREAT ?TM UR-MCNC: 66 MG/DL — SIGNIFICANT CHANGE UP
CREAT SERPL-MCNC: 0.57 MG/DL — SIGNIFICANT CHANGE UP (ref 0.5–1.3)
DIFF PNL FLD: NEGATIVE — SIGNIFICANT CHANGE UP
EGFR: 116 ML/MIN/1.73M2 — SIGNIFICANT CHANGE UP
EPI CELLS # UR: ABNORMAL /HPF (ref 0–5)
GLUCOSE BLDC GLUCOMTR-MCNC: 78 MG/DL — SIGNIFICANT CHANGE UP (ref 70–99)
GLUCOSE BLDC GLUCOMTR-MCNC: 80 MG/DL — SIGNIFICANT CHANGE UP (ref 70–99)
GLUCOSE BLDC GLUCOMTR-MCNC: 98 MG/DL — SIGNIFICANT CHANGE UP (ref 70–99)
GLUCOSE SERPL-MCNC: 79 MG/DL — SIGNIFICANT CHANGE UP (ref 70–99)
GLUCOSE UR QL: NEGATIVE — SIGNIFICANT CHANGE UP
HCT VFR BLD CALC: 26.9 % — LOW (ref 39–50)
HGB BLD-MCNC: 7.6 G/DL — LOW (ref 13–17)
HYALINE CASTS # UR AUTO: SIGNIFICANT CHANGE UP /LPF (ref 0–2)
INR BLD: 1.22 — HIGH (ref 0.88–1.16)
KETONES UR-MCNC: NEGATIVE — SIGNIFICANT CHANGE UP
LEUKOCYTE ESTERASE UR-ACNC: NEGATIVE — SIGNIFICANT CHANGE UP
MAGNESIUM SERPL-MCNC: 2.2 MG/DL — SIGNIFICANT CHANGE UP (ref 1.6–2.6)
MCHC RBC-ENTMCNC: 28.3 GM/DL — LOW (ref 32–36)
MCHC RBC-ENTMCNC: 29.5 PG — SIGNIFICANT CHANGE UP (ref 27–34)
MCV RBC AUTO: 104.3 FL — HIGH (ref 80–100)
NITRITE UR-MCNC: NEGATIVE — SIGNIFICANT CHANGE UP
NRBC # BLD: 0 /100 WBCS — SIGNIFICANT CHANGE UP (ref 0–0)
OSMOLALITY UR: 561 MOSM/KG — SIGNIFICANT CHANGE UP (ref 300–900)
PH UR: 5.5 — SIGNIFICANT CHANGE UP (ref 5–8)
PHOSPHATE SERPL-MCNC: 4.2 MG/DL — SIGNIFICANT CHANGE UP (ref 2.5–4.5)
PLATELET # BLD AUTO: 537 K/UL — HIGH (ref 150–400)
POTASSIUM SERPL-MCNC: 4.1 MMOL/L — SIGNIFICANT CHANGE UP (ref 3.5–5.3)
POTASSIUM SERPL-SCNC: 4.1 MMOL/L — SIGNIFICANT CHANGE UP (ref 3.5–5.3)
POTASSIUM UR-SCNC: 44 MMOL/L — SIGNIFICANT CHANGE UP
PROT ?TM UR-MCNC: 69 MG/DL — HIGH (ref 0–12)
PROT UR-MCNC: 30 MG/DL
PROT/CREAT UR-RTO: 1 RATIO — HIGH (ref 0–0.2)
PROTHROM AB SERPL-ACNC: 14.5 SEC — HIGH (ref 10.5–13.4)
RBC # BLD: 2.58 M/UL — LOW (ref 4.2–5.8)
RBC # FLD: 17.2 % — HIGH (ref 10.3–14.5)
RBC CASTS # UR COMP ASSIST: < 5 /HPF — SIGNIFICANT CHANGE UP
SODIUM SERPL-SCNC: 139 MMOL/L — SIGNIFICANT CHANGE UP (ref 135–145)
SODIUM UR-SCNC: 47 MMOL/L — SIGNIFICANT CHANGE UP
SP GR SPEC: 1.02 — SIGNIFICANT CHANGE UP (ref 1–1.03)
UROBILINOGEN FLD QL: 0.2 E.U./DL — SIGNIFICANT CHANGE UP
UUN UR-MCNC: 664 MG/DL — SIGNIFICANT CHANGE UP
WBC # BLD: 12.01 K/UL — HIGH (ref 3.8–10.5)
WBC # FLD AUTO: 12.01 K/UL — HIGH (ref 3.8–10.5)
WBC UR QL: ABNORMAL /HPF

## 2023-06-26 PROCEDURE — 76770 US EXAM ABDO BACK WALL COMP: CPT | Mod: 26

## 2023-06-26 PROCEDURE — 99232 SBSQ HOSP IP/OBS MODERATE 35: CPT | Mod: GC

## 2023-06-26 RX ORDER — HEPARIN SODIUM 5000 [USP'U]/ML
5000 INJECTION INTRAVENOUS; SUBCUTANEOUS EVERY 8 HOURS
Refills: 0 | Status: DISCONTINUED | OUTPATIENT
Start: 2023-06-26 | End: 2023-07-17

## 2023-06-26 RX ORDER — SODIUM CHLORIDE 9 MG/ML
1000 INJECTION, SOLUTION INTRAVENOUS
Refills: 0 | Status: DISCONTINUED | OUTPATIENT
Start: 2023-06-26 | End: 2023-07-02

## 2023-06-26 RX ORDER — SODIUM CHLORIDE 9 MG/ML
250 INJECTION, SOLUTION INTRAVENOUS ONCE
Refills: 0 | Status: COMPLETED | OUTPATIENT
Start: 2023-06-26 | End: 2023-06-26

## 2023-06-26 RX ORDER — OXYCODONE HYDROCHLORIDE 5 MG/1
15 TABLET ORAL EVERY 6 HOURS
Refills: 0 | Status: DISCONTINUED | OUTPATIENT
Start: 2023-06-26 | End: 2023-06-27

## 2023-06-26 RX ORDER — ASPIRIN/CALCIUM CARB/MAGNESIUM 324 MG
81 TABLET ORAL DAILY
Refills: 0 | Status: DISCONTINUED | OUTPATIENT
Start: 2023-06-26 | End: 2023-07-19

## 2023-06-26 RX ORDER — SODIUM CHLORIDE 9 MG/ML
1000 INJECTION, SOLUTION INTRAVENOUS
Refills: 0 | Status: DISCONTINUED | OUTPATIENT
Start: 2023-06-26 | End: 2023-06-26

## 2023-06-26 RX ORDER — ACETAMINOPHEN 500 MG
1000 TABLET ORAL ONCE
Refills: 0 | Status: COMPLETED | OUTPATIENT
Start: 2023-06-26 | End: 2023-06-26

## 2023-06-26 RX ADMIN — LACOSAMIDE 200 MILLIGRAM(S): 50 TABLET ORAL at 07:02

## 2023-06-26 RX ADMIN — CHLORHEXIDINE GLUCONATE 15 MILLILITER(S): 213 SOLUTION TOPICAL at 17:30

## 2023-06-26 RX ADMIN — Medication 2.5 MILLIGRAM(S): at 23:30

## 2023-06-26 RX ADMIN — Medication 81 MILLIGRAM(S): at 17:28

## 2023-06-26 RX ADMIN — Medication 2 MILLIGRAM(S): at 14:46

## 2023-06-26 RX ADMIN — HYDROMORPHONE HYDROCHLORIDE 1 MILLIGRAM(S): 2 INJECTION INTRAMUSCULAR; INTRAVENOUS; SUBCUTANEOUS at 15:38

## 2023-06-26 RX ADMIN — PANTOPRAZOLE SODIUM 40 MILLIGRAM(S): 20 TABLET, DELAYED RELEASE ORAL at 12:02

## 2023-06-26 RX ADMIN — SODIUM CHLORIDE 4 MILLILITER(S): 9 INJECTION INTRAMUSCULAR; INTRAVENOUS; SUBCUTANEOUS at 12:00

## 2023-06-26 RX ADMIN — SODIUM CHLORIDE 4 MILLILITER(S): 9 INJECTION INTRAMUSCULAR; INTRAVENOUS; SUBCUTANEOUS at 05:10

## 2023-06-26 RX ADMIN — Medication 2 MILLIGRAM(S): at 23:28

## 2023-06-26 RX ADMIN — OXYCODONE HYDROCHLORIDE 15 MILLIGRAM(S): 5 TABLET ORAL at 00:02

## 2023-06-26 RX ADMIN — SODIUM CHLORIDE 500 MILLILITER(S): 9 INJECTION, SOLUTION INTRAVENOUS at 06:14

## 2023-06-26 RX ADMIN — SODIUM CHLORIDE 140 MILLILITER(S): 9 INJECTION, SOLUTION INTRAVENOUS at 02:05

## 2023-06-26 RX ADMIN — Medication 15 MILLILITER(S): at 13:12

## 2023-06-26 RX ADMIN — HEPARIN SODIUM 5000 UNIT(S): 5000 INJECTION INTRAVENOUS; SUBCUTANEOUS at 23:32

## 2023-06-26 RX ADMIN — LACOSAMIDE 200 MILLIGRAM(S): 50 TABLET ORAL at 17:28

## 2023-06-26 RX ADMIN — Medication 3 MILLILITER(S): at 12:00

## 2023-06-26 RX ADMIN — Medication 975 MILLIGRAM(S): at 23:29

## 2023-06-26 RX ADMIN — OXYCODONE HYDROCHLORIDE 15 MILLIGRAM(S): 5 TABLET ORAL at 12:02

## 2023-06-26 RX ADMIN — SODIUM CHLORIDE 80 MILLILITER(S): 9 INJECTION, SOLUTION INTRAVENOUS at 07:35

## 2023-06-26 RX ADMIN — Medication 2.5 MILLIGRAM(S): at 17:30

## 2023-06-26 RX ADMIN — Medication 1 DROP(S): at 05:12

## 2023-06-26 RX ADMIN — CHLORHEXIDINE GLUCONATE 15 MILLILITER(S): 213 SOLUTION TOPICAL at 05:10

## 2023-06-26 RX ADMIN — Medication 3 MILLILITER(S): at 17:29

## 2023-06-26 RX ADMIN — Medication 975 MILLIGRAM(S): at 18:59

## 2023-06-26 RX ADMIN — Medication 975 MILLIGRAM(S): at 13:12

## 2023-06-26 RX ADMIN — OXYCODONE HYDROCHLORIDE 15 MILLIGRAM(S): 5 TABLET ORAL at 13:12

## 2023-06-26 RX ADMIN — Medication 1000 MILLIGRAM(S): at 05:48

## 2023-06-26 RX ADMIN — Medication 975 MILLIGRAM(S): at 12:01

## 2023-06-26 RX ADMIN — Medication 975 MILLIGRAM(S): at 00:02

## 2023-06-26 RX ADMIN — OXYCODONE HYDROCHLORIDE 15 MILLIGRAM(S): 5 TABLET ORAL at 06:13

## 2023-06-26 RX ADMIN — Medication 2 MILLIGRAM(S): at 05:13

## 2023-06-26 RX ADMIN — Medication 3 MILLILITER(S): at 05:11

## 2023-06-26 RX ADMIN — HYDROMORPHONE HYDROCHLORIDE 1 MILLIGRAM(S): 2 INJECTION INTRAMUSCULAR; INTRAVENOUS; SUBCUTANEOUS at 14:51

## 2023-06-26 RX ADMIN — OXYCODONE HYDROCHLORIDE 15 MILLIGRAM(S): 5 TABLET ORAL at 05:12

## 2023-06-26 RX ADMIN — Medication 3 MILLILITER(S): at 23:31

## 2023-06-26 RX ADMIN — Medication 2.5 MILLIGRAM(S): at 05:11

## 2023-06-26 RX ADMIN — Medication 1 DROP(S): at 17:29

## 2023-06-26 RX ADMIN — Medication 400 MILLIGRAM(S): at 05:16

## 2023-06-26 RX ADMIN — SODIUM CHLORIDE 4 MILLILITER(S): 9 INJECTION INTRAMUSCULAR; INTRAVENOUS; SUBCUTANEOUS at 17:29

## 2023-06-26 RX ADMIN — Medication 975 MILLIGRAM(S): at 17:28

## 2023-06-26 RX ADMIN — Medication 2.5 MILLIGRAM(S): at 12:02

## 2023-06-26 NOTE — PROGRESS NOTE ADULT - ASSESSMENT
53 year old male with PMHx HTN, type A aortic dissection s/p Dacron grafts, AV resuspension in 2013, CAD s/p CABG x 1 SVG to RCA (5/2013 with Dr. Medina), seizure disorder, recent admission 3/20-4/14 for replacement of transverse aortic arch, second stage TEVAR 5/5 with course c/b peripancreatic/RP hemorrhage/hematoma formation s/p multiple washouts on antibiotics. Epilepsy team was initially consulted for suspected seizure on 5/12, which was thought to be provoked. Additional events on 5/14/23 that did not correlate with findings on EEG recordings. Vimpat was increased to 250 mg BID and he was tapered off Depakote previously (Originally on LCM 100mg BID and Divalproex 1000mg BID) due to concerns for possible drug-related hemorrhagic pancreatitis, however, seems less likely and currently pancreatitis is thought to be secondary to cholecystitis. CTH from 5/18 without acute pathology. Neurology was reconsulted for new AMS/agitation and an episode of patient pulling the trach. Patient doing significantly well today, wife bedside reported that the patient has so far tolerated Vimpat the best. EEG: No electrographic seizures or significant clinical events      Recs:   - DC vEEG  - cw vimpat 200 mg BID  - Seizure & fall precautions  - Keep Mg >2 and K >4, replete PRN  - Can give 2 mg IV Ativan for seizures > 2 mins  - Please call for any changes in patient's clinical or neurological status  - Continue rest of care as per primary team

## 2023-06-26 NOTE — CHART NOTE - NSCHARTNOTEFT_GEN_A_CORE
Admitting Diagnosis:   Patient is a 53y old  Male who presents with a chief complaint of endoleak, abdominal pain (2023 12:33)      PAST MEDICAL & SURGICAL HISTORY:  HTN (hypertension)      Aortic dissection      CAD (coronary artery disease)      Seizure disorder      Seizure disorder      Hypertension      Status post endovascular aneurysm repair (EVAR)      S/P aortic bifurcation bypass graft      S/P CABG x 1    Current Nutrition Order: NPO    PO Intake: Good (%) [   ]  Fair (50-75%) [   ] Poor (<25%) [   ] [x] NPO    GI Issues:  No n/v/d/c documented at this time. Last documented bowel movement .    Pain: No pain/discomfort per EMR    Skin Integrity: unstageable PUs to sacrum & coccyx, DTIs to scapula, L ear, stage II PU to penis, CODIE drains x 3, pancreatic drain, J tube, 2+ edema to extremities    Labs:       139  |  109<H>  |  17  ----------------------------<  79  4.1   |  23  |  0.57    Ca    8.3<L>      2023 06:58  Phos  4.2       Mg     2.2           CAPILLARY BLOOD GLUCOSE      POCT Blood Glucose.: 78 mg/dL (2023 11:26)  POCT Blood Glucose.: 80 mg/dL (2023 06:07)  POCT Blood Glucose.: 94 mg/dL (2023 23:53)  POCT Blood Glucose.: 90 mg/dL (2023 18:22)      Medications:  MEDICATIONS  (STANDING):  acetaminophen   Oral Liquid .. 975 milliGRAM(s) Enteral Tube every 6 hours  albuterol/ipratropium for Nebulization 3 milliLiter(s) Nebulizer every 6 hours  artificial  tears Solution 1 Drop(s) Both EYES two times a day  chlorhexidine 0.12% Liquid 15 milliLiter(s) Oral Mucosa every 12 hours  dextrose 5% + sodium chloride 0.45%. 1000 milliLiter(s) (80 mL/Hr) IV Continuous <Continuous>  dextrose 5%. 1000 milliLiter(s) (100 mL/Hr) IV Continuous <Continuous>  dextrose 50% Injectable 25 Gram(s) IV Push once  glucagon  Injectable 1 milliGRAM(s) IntraMuscular once  insulin lispro (ADMELOG) corrective regimen sliding scale   SubCutaneous every 6 hours  lacosamide Solution 200 milliGRAM(s) Oral two times a day  loperamide Liquid 2 milliGRAM(s) Enteral Tube every 8 hours  metoprolol tartrate Injectable 2.5 milliGRAM(s) IV Push every 6 hours  multivitamin/minerals/iron Oral Solution (CENTRUM) 15 milliLiter(s) Oral daily  oxyCODONE    Solution 15 milliGRAM(s) Oral every 6 hours  pantoprazole  Injectable 40 milliGRAM(s) IV Push daily  sodium chloride 3%  Inhalation 4 milliLiter(s) Inhalation every 6 hours    MEDICATIONS  (PRN):  dextrose Oral Gel 15 Gram(s) Oral once PRN Blood Glucose LESS THAN 70 milliGRAM(s)/deciliter  HYDROmorphone  Injectable 1 milliGRAM(s) IV Push every 4 hours PRN breakthrough pain  sodium chloride 0.9% lock flush 10 milliLiter(s) IV Push every 1 hour PRN Pre/post blood products, medications, blood draw, and to maintain line patenc    Weight: 90.9kg  --- no new wt obtained  70kg dosing wt May &     Weight Change: wt change ? 2/2 edema, need reweight to confirm. recommend daily weights for trending    Estimated energy needs:   Previous wt of 70kg used for energy calculations as falls within % IBW  Needs adjusted for age, clinical conditions, pressure ulcers  25-30 kcals/[id number] kcals/day  1.2-1.7 g/k-119g protein/day  Fluids per team.    Subjective:   53yMale w/ PMHx of HTN, type A aortic dissection s/p Dacron grafts, AV resuspension in , CAD s/p CABG x 1 SVG to RCA (2013 with Dr. Medina), seizure disorder, recent admission 3/20- for replacement of transverse aortic arch, second stage TEVAR and aorto-axillary bypass, AV replacement (bio 23mm), and CABG x 1 (SVG-RCA). Pt found to have endo leak and taken to OR for TEVAR revision on , Post op course c/b Klebsiella bacteremia (5/15), resp failure requiring intubation on , Mucus plugging s/p Bronch  and PEA arrest. Post operatively pt also found to have hemorrhagic pancreatitis with venu-pancreatic abscess possibly 2* to Depakote therefore s/p IR aspiration of peripancreatic fluid collection and paracentesis on , IR venu-pancreatic abscess drainage and placement of drainage catheter on . Pt then transferred from CTICU to SICU for further mgmt of hemorrhagic pancreatitis on . s/p IR placement of 2 new drainage catheters and catheter exchange on . LUQ drainage . OR  exlap washout & replacement of 3 new JPs and abthera vac with open abdomen. RTOR , no bleeding, evac of old blood, placement of feeding J-tube. Failed extubation 6/3: s/p Trach .    Patient seen at bedside for follow up assessment-on TC. Labs reviewed ; electrolytes within normal limits at this time. Currently NPO. RD to follow up. See nutrition recommendations below.     Previous Nutrition Diagnosis:  Increased nutrients RT increased demands AEB Vent, Pressure ulcers    Active [  X ]  Resolved [   ]    If resolved, new PES:     Goal:  Pt will meet at least 75% of protein & energy needs via most appropriate route for nutrition     Recommendations:  1. As medically feasible continue EN via PEJ as tolerated  - Vital 1.0 @70ml/hr x 24hrs; 1680 ml total volume, 1680kcals, 67.2g protein, 1404ml free water daily  + LPS to TID [+ 300kcals, 30g protein] to provide 28.2 kcals/kg 1.6g protein/kg  2. Lytes per team  - risk for depletion iso diarrhea  - K+ please replenish  3. Hydration per team  4. I&Os  5. Monitor chemistry, GI function, skin integrity    Education: deferred     Risk Level: High [  X ] Moderate [   ] Low [   ].

## 2023-06-26 NOTE — PROGRESS NOTE ADULT - SUBJECTIVE AND OBJECTIVE BOX
SUBJECTIVE:  Pt seen and examined at bedside with chief. Pt is resting comfortably in bed connected to EEG. Pain well controlled.    MEDICATIONS  (STANDING):  acetaminophen   Oral Liquid .. 975 milliGRAM(s) Enteral Tube every 6 hours  albuterol/ipratropium for Nebulization 3 milliLiter(s) Nebulizer every 6 hours  artificial  tears Solution 1 Drop(s) Both EYES two times a day  chlorhexidine 0.12% Liquid 15 milliLiter(s) Oral Mucosa every 12 hours  dextrose 5% + sodium chloride 0.45%. 1000 milliLiter(s) (80 mL/Hr) IV Continuous <Continuous>  dextrose 5%. 1000 milliLiter(s) (100 mL/Hr) IV Continuous <Continuous>  dextrose 50% Injectable 25 Gram(s) IV Push once  glucagon  Injectable 1 milliGRAM(s) IntraMuscular once  insulin lispro (ADMELOG) corrective regimen sliding scale   SubCutaneous every 6 hours  lacosamide Solution 200 milliGRAM(s) Oral two times a day  loperamide Liquid 2 milliGRAM(s) Enteral Tube every 8 hours  metoprolol tartrate Injectable 2.5 milliGRAM(s) IV Push every 6 hours  multivitamin/minerals/iron Oral Solution (CENTRUM) 15 milliLiter(s) Oral daily  oxyCODONE    Solution 15 milliGRAM(s) Oral every 6 hours  pantoprazole  Injectable 40 milliGRAM(s) IV Push daily  sodium chloride 3%  Inhalation 4 milliLiter(s) Inhalation every 6 hours    MEDICATIONS  (PRN):  dextrose Oral Gel 15 Gram(s) Oral once PRN Blood Glucose LESS THAN 70 milliGRAM(s)/deciliter  HYDROmorphone  Injectable 1 milliGRAM(s) IV Push every 4 hours PRN breakthrough pain  sodium chloride 0.9% lock flush 10 milliLiter(s) IV Push every 1 hour PRN Pre/post blood products, medications, blood draw, and to maintain line patency      Vital Signs Last 24 Hrs  T(C): 37 (26 Jun 2023 04:04), Max: 37 (26 Jun 2023 04:04)  T(F): 98.6 (26 Jun 2023 04:04), Max: 98.6 (26 Jun 2023 04:04)  HR: 80 (26 Jun 2023 05:30) (80 - 94)  BP: 153/78 (26 Jun 2023 05:30) (131/76 - 153/78)  BP(mean): 106 (26 Jun 2023 05:30) (96 - 106)  RR: 18 (26 Jun 2023 05:30) (17 - 18)  SpO2: 99% (26 Jun 2023 05:30) (99% - 100%)    Parameters below as of 26 Jun 2023 05:30  Patient On (Oxygen Delivery Method): tracheostomy collar  O2 Flow (L/min): 10  O2 Concentration (%): 40    PHYSICAL EXAM:      Constitutional: A&Ox3    Respiratory: non labored breathing, no respiratory distress    Cardiovascular: NSR, RRR    Gastrointestinal:soft ND, NT, IR drain and OR drain intact                  Incision: CDI    Genitourinary: Voiding     Extremities: (+) edema                  I&O's Detail    25 Jun 2023 07:01  -  26 Jun 2023 07:00  --------------------------------------------------------  IN:    dextrose 5% + sodium chloride 0.45%: 80 mL    Lactated Ringers: 1120 mL    Lactated Ringers Bolus: 250 mL    Vital1.0: 1120 mL  Total IN: 2570 mL    OUT:    Bulb (mL): 30 mL    Bulb (mL): 2 mL    Drain (mL): 0 mL    Drain (mL): 10 mL    Incontinent per Condom Catheter (mL): 450 mL    Voided (mL): 0 mL  Total OUT: 492 mL    Total NET: 2078 mL      26 Jun 2023 07:01  -  26 Jun 2023 08:03  --------------------------------------------------------  IN:    dextrose 5% + sodium chloride 0.45%: 80 mL  Total IN: 80 mL    OUT:  Total OUT: 0 mL    Total NET: 80 mL          LABS:                        7.6    12.01 )-----------( 537      ( 26 Jun 2023 06:58 )             26.9     06-26    139  |  109<H>  |  17  ----------------------------<  79  4.1   |  23  |  0.57    Ca    8.3<L>      26 Jun 2023 06:58  Phos  4.2     06-26  Mg     2.2     06-26      PT/INR - ( 26 Jun 2023 06:58 )   PT: 14.5 sec;   INR: 1.22          PTT - ( 26 Jun 2023 06:58 )  PTT:38.5 sec  Urinalysis Basic - ( 26 Jun 2023 06:58 )    Color: x / Appearance: x / SG: x / pH: x  Gluc: 79 mg/dL / Ketone: x  / Bili: x / Urobili: x   Blood: x / Protein: x / Nitrite: x   Leuk Esterase: x / RBC: x / WBC x   Sq Epi: x / Non Sq Epi: x / Bacteria: x        RADIOLOGY & ADDITIONAL STUDIES:

## 2023-06-26 NOTE — EEG REPORT - NS EEG TEXT BOX
[facility] Department of Neurology  Inpatient Continuous video-Electroencephalogram      Patient Name:	VINCENT MARIE    :	1970  MRN:	[id number]    Study Start Date/Time:	2023, 7:15:54 PM  Study End Date/Time:    Referred by:  Dr. Paris Solano    Brief Clinical History:  VINCENT MARIE is a 53 year old Male with prior history of epilepsy admitted for aortic repair and medical issues; study performed to investigate for seizures or markers of epilepsy.   Technologist notes: -  Diagnosis Code:  R56.9 convulsions/seizure    Pertinent Medication:  LCM 200mg bid    Acquisition Details:  Electroencephalography was acquired using a minimum of 21 channels on an [employer] system v 9.3.1 with electrode placement according to the standard International 10-20 system following ACNS (American Clinical Neurophysiology Society) guidelines.  Anterior temporal T1 and T2 electrodes were utilized whenever possible.  The [employer] automated spike & seizure detections were all reviewed in detail, in addition to the entire raw EEG.    Findings:  Day 1:  2023, 7:15:54 PM to next morning at 07:00 AM  Background:  continuous, with predominantly theta > alpha frequencies.  Generalized Slowing:  Intermittent rare sporadic polymorphic delta  Symmetry/Focality: No persistent asymmetries of voltage or frequency.  Voltage:  Normal (20+ uV)  Organization:  Appropriate anterior-posterior gradient  Posterior Dominant Rhythm:  7-8 Hz symmetric, well-organized, and well-modulated  Sleep:  Symmetric, synchronous spindles and K complexes.  Variability:   Yes		Reactivity:  Yes    Spontaneous Activity:  No epileptiform discharges  Events:  1)	No electrographic seizures or significant clinical events occurred during this study.  Provocations:  •	Hyperventilation: was not performed.  •	Photic stimulation: was not performed.  Daily Summary:    1)	There was mild excess intermittent slow wave activity, a marker of mild cerebral dysfunction.  2)	There were no findings of active epilepsy, however this alone does not rule out the diagnosis.       Elvis Alves MD  Attending Neurologist, [facility] Epilepsy Program

## 2023-06-26 NOTE — PROGRESS NOTE ADULT - SUBJECTIVE AND OBJECTIVE BOX
Neurology Progress Note    Interval History:  Patient was seen and examined at bedside. He was resting comfortably and did not appear to be in distress. He mentioned feeling better.        Medications:  acetaminophen   Oral Liquid .. 975 milliGRAM(s) Enteral Tube every 6 hours  albuterol/ipratropium for Nebulization 3 milliLiter(s) Nebulizer every 6 hours  artificial  tears Solution 1 Drop(s) Both EYES two times a day  chlorhexidine 0.12% Liquid 15 milliLiter(s) Oral Mucosa every 12 hours  dextrose 5% + sodium chloride 0.45%. 1000 milliLiter(s) IV Continuous <Continuous>  dextrose 5%. 1000 milliLiter(s) IV Continuous <Continuous>  dextrose 50% Injectable 25 Gram(s) IV Push once  dextrose Oral Gel 15 Gram(s) Oral once PRN  glucagon  Injectable 1 milliGRAM(s) IntraMuscular once  HYDROmorphone  Injectable 1 milliGRAM(s) IV Push every 4 hours PRN  insulin lispro (ADMELOG) corrective regimen sliding scale   SubCutaneous every 6 hours  lacosamide Solution 200 milliGRAM(s) Oral two times a day  loperamide Liquid 2 milliGRAM(s) Enteral Tube every 8 hours  metoprolol tartrate Injectable 2.5 milliGRAM(s) IV Push every 6 hours  multivitamin/minerals/iron Oral Solution (CENTRUM) 15 milliLiter(s) Oral daily  oxyCODONE    Solution 15 milliGRAM(s) Oral every 6 hours  pantoprazole  Injectable 40 milliGRAM(s) IV Push daily  sodium chloride 0.9% lock flush 10 milliLiter(s) IV Push every 1 hour PRN  sodium chloride 3%  Inhalation 4 milliLiter(s) Inhalation every 6 hours      Vital Signs Last 24 Hrs  T(C): 37.3 (2023 08:00), Max: 37.3 (2023 08:00)  T(F): 99.2 (2023 08:00), Max: 99.2 (2023 08:00)  HR: 80 (2023 09:02) (80 - 91)  BP: 144/83 (2023 08:16) (131/76 - 153/78)  BP(mean): 108 (2023 08:16) (96 - 108)  RR: 20 (2023 09:02) (17 - 20)  SpO2: 99% (2023 09:02) (98% - 100%)    Parameters below as of 2023 09:02  Patient On (Oxygen Delivery Method): tracheostomy collar  O2 Flow (L/min): 10  O2 Concentration (%): 40    Neurological Examination:  General:  Appearance is consistent with chronologic age.  No abnormal facies.  Gross skin survey within normal limits.    Cognitive/Language:  Awake, participated in exam   Cranial Nerves  - Eyes:  EOMI w/o nystagmus, skew or reported double vision.  PERRL.  No ptosis/weakness of eyelid closure.    - Face:  Facial sensation normal V1 - 3, no facial asymmetry.    - Ears/Nose/Throat:  Hearing grossly intact b/l to finger rub. Tongue midline.   Motor examination:  Upper Extremities: L 5/5, R 5/5; Lower extremities: L 1/5, R 1/5.  No observable drift. Normal tone and bulk.   Sensory examination:   Intact to light touch in all extremities.  Cerebellum:   FTN intact.      Labs:  CBC Full  -  ( 2023 06:58 )  WBC Count : 12.01 K/uL  RBC Count : 2.58 M/uL  Hemoglobin : 7.6 g/dL  Hematocrit : 26.9 %  Platelet Count - Automated : 537 K/uL  Mean Cell Volume : 104.3 fl  Mean Cell Hemoglobin : 29.5 pg  Mean Cell Hemoglobin Concentration : 28.3 gm/dL  Auto Neutrophil # : x  Auto Lymphocyte # : x  Auto Monocyte # : x  Auto Eosinophil # : x  Auto Basophil # : x  Auto Neutrophil % : x  Auto Lymphocyte % : x  Auto Monocyte % : x  Auto Eosinophil % : x  Auto Basophil % : x    -    139  |  109<H>  |  17  ----------------------------<  79  4.1   |  23  |  0.57    Ca    8.3<L>      2023 06:58  Phos  4.2     -  Mg     2.2     -26        PT/INR - ( 2023 06:58 )   PT: 14.5 sec;   INR: 1.22          PTT - ( 2023 06:58 )  PTT:38.5 sec  Urinalysis Basic - ( 2023 07:56 )    Color: Yellow / Appearance: SL Cloudy / S.020 / pH: x  Gluc: x / Ketone: NEGATIVE  / Bili: Negative / Urobili: 0.2 E.U./dL   Blood: x / Protein: 30 mg/dL / Nitrite: NEGATIVE   Leuk Esterase: NEGATIVE / RBC: < 5 /HPF / WBC 5-10 /HPF   Sq Epi: x / Non Sq Epi: x / Bacteria: None /HPF

## 2023-06-26 NOTE — PROGRESS NOTE ADULT - SUBJECTIVE AND OBJECTIVE BOX
52yo Male pt with PMH HTN, type A aortic dissection s/p Dacron grafts, AV resuspension in 2013, CAD s/p CABG x 1 SVG to RCA (5/2013 with Dr. Medina), seizure disorder (last episode on 7/4/22) S/P replacement of transverse aortic arch, second stage thoracic endovascular aortic repair, aorto-axillary bypass, AV replacement (bio 23mm), in APr 2023 and CABG x 1 (SVG-RCA) EF 75% (Ignacio, 3/20) now s/p 2nd state TEVAR on 5/5 for whom surgery was consulted for finding of acute pancreatitis with large peripancreatic/perigastric fluid collections on CTA abdomen 5/8 then acute hemorrhage starting 5/12/23 requiring 11 U pRBC over last 48 hours but with negative mesenteric angiogram 5/13/23. S/p paracentesis and LUQ collection aspiration (no drain per surgery) on 5/22/23. LUQ collection growing Klebsiella pneumoniae. 5/24/23 LUQ drain placement by IR.       Peripancreatic and LUQ hematoma drains dc's. IR will sign off now. 52yo Male pt with PMH HTN, type A aortic dissection s/p Dacron grafts, AV resuspension in 2013, CAD s/p CABG x 1 SVG to RCA (5/2013 with Dr. Medina), seizure disorder (last episode on 7/4/22) S/P replacement of transverse aortic arch, second stage thoracic endovascular aortic repair, aorto-axillary bypass, AV replacement (bio 23mm), in APr 2023 and CABG x 1 (SVG-RCA) EF 75% (Ignacio, 3/20) now s/p 2nd state TEVAR on 5/5 for whom surgery was consulted for finding of acute pancreatitis with large peripancreatic/perigastric fluid collections on CTA abdomen 5/8 then acute hemorrhage starting 5/12/23 requiring 11 U pRBC over last 48 hours but with negative mesenteric angiogram 5/13/23. S/p paracentesis and LUQ collection aspiration (no drain per surgery) on 5/22/23. LUQ collection growing Klebsiella pneumoniae. 5/24/23 LUQ drain placement by IR.       Peripancreatic and LUQ hematoma drains dc'd by surgery. IR will sign off now.

## 2023-06-27 ENCOUNTER — TRANSCRIPTION ENCOUNTER (OUTPATIENT)
Age: 53
End: 2023-06-27

## 2023-06-27 LAB
ANION GAP SERPL CALC-SCNC: 9 MMOL/L — SIGNIFICANT CHANGE UP (ref 5–17)
BLD GP AB SCN SERPL QL: NEGATIVE — SIGNIFICANT CHANGE UP
BUN SERPL-MCNC: 15 MG/DL — SIGNIFICANT CHANGE UP (ref 7–23)
CALCIUM SERPL-MCNC: 7.9 MG/DL — LOW (ref 8.4–10.5)
CHLORIDE SERPL-SCNC: 106 MMOL/L — SIGNIFICANT CHANGE UP (ref 96–108)
CO2 SERPL-SCNC: 25 MMOL/L — SIGNIFICANT CHANGE UP (ref 22–31)
CREAT SERPL-MCNC: 0.55 MG/DL — SIGNIFICANT CHANGE UP (ref 0.5–1.3)
CYSTATIN C SERPL-MCNC: 1.7 MG/L — HIGH (ref 0.72–1.32)
EGFR: 118 ML/MIN/1.73M2 — SIGNIFICANT CHANGE UP
GFR/BSA.PRED SERPLBLD CYS-BASED-ARV: 39 ML/MIN/1.73M2 — LOW
GLUCOSE BLDC GLUCOMTR-MCNC: 106 MG/DL — HIGH (ref 70–99)
GLUCOSE BLDC GLUCOMTR-MCNC: 106 MG/DL — HIGH (ref 70–99)
GLUCOSE BLDC GLUCOMTR-MCNC: 111 MG/DL — HIGH (ref 70–99)
GLUCOSE BLDC GLUCOMTR-MCNC: 129 MG/DL — HIGH (ref 70–99)
GLUCOSE BLDC GLUCOMTR-MCNC: 94 MG/DL — SIGNIFICANT CHANGE UP (ref 70–99)
GLUCOSE BLDC GLUCOMTR-MCNC: 96 MG/DL — SIGNIFICANT CHANGE UP (ref 70–99)
GLUCOSE SERPL-MCNC: 91 MG/DL — SIGNIFICANT CHANGE UP (ref 70–99)
HCT VFR BLD CALC: 23.2 % — LOW (ref 39–50)
HGB BLD-MCNC: 7.2 G/DL — LOW (ref 13–17)
MAGNESIUM SERPL-MCNC: 2 MG/DL — SIGNIFICANT CHANGE UP (ref 1.6–2.6)
MCHC RBC-ENTMCNC: 29.9 PG — SIGNIFICANT CHANGE UP (ref 27–34)
MCHC RBC-ENTMCNC: 31 GM/DL — LOW (ref 32–36)
MCV RBC AUTO: 96.3 FL — SIGNIFICANT CHANGE UP (ref 80–100)
NRBC # BLD: 0 /100 WBCS — SIGNIFICANT CHANGE UP (ref 0–0)
PHOSPHATE SERPL-MCNC: 3.9 MG/DL — SIGNIFICANT CHANGE UP (ref 2.5–4.5)
PLATELET # BLD AUTO: 549 K/UL — HIGH (ref 150–400)
POTASSIUM SERPL-MCNC: 3.6 MMOL/L — SIGNIFICANT CHANGE UP (ref 3.5–5.3)
POTASSIUM SERPL-SCNC: 3.6 MMOL/L — SIGNIFICANT CHANGE UP (ref 3.5–5.3)
RBC # BLD: 2.41 M/UL — LOW (ref 4.2–5.8)
RBC # FLD: 16.6 % — HIGH (ref 10.3–14.5)
RH IG SCN BLD-IMP: POSITIVE — SIGNIFICANT CHANGE UP
SODIUM SERPL-SCNC: 140 MMOL/L — SIGNIFICANT CHANGE UP (ref 135–145)
WBC # BLD: 12.12 K/UL — HIGH (ref 3.8–10.5)
WBC # FLD AUTO: 12.12 K/UL — HIGH (ref 3.8–10.5)

## 2023-06-27 PROCEDURE — 99233 SBSQ HOSP IP/OBS HIGH 50: CPT | Mod: GC

## 2023-06-27 RX ORDER — OXYCODONE HYDROCHLORIDE 5 MG/1
10 TABLET ORAL EVERY 6 HOURS
Refills: 0 | Status: DISCONTINUED | OUTPATIENT
Start: 2023-06-27 | End: 2023-07-03

## 2023-06-27 RX ORDER — OXYCODONE HYDROCHLORIDE 5 MG/1
10 TABLET ORAL EVERY 6 HOURS
Refills: 0 | Status: DISCONTINUED | OUTPATIENT
Start: 2023-06-27 | End: 2023-06-27

## 2023-06-27 RX ORDER — POTASSIUM CHLORIDE 20 MEQ
40 PACKET (EA) ORAL ONCE
Refills: 0 | Status: COMPLETED | OUTPATIENT
Start: 2023-06-27 | End: 2023-06-27

## 2023-06-27 RX ADMIN — Medication 1 DROP(S): at 18:56

## 2023-06-27 RX ADMIN — SODIUM CHLORIDE 4 MILLILITER(S): 9 INJECTION INTRAMUSCULAR; INTRAVENOUS; SUBCUTANEOUS at 13:22

## 2023-06-27 RX ADMIN — Medication 2.5 MILLIGRAM(S): at 18:56

## 2023-06-27 RX ADMIN — Medication 2 MILLIGRAM(S): at 07:00

## 2023-06-27 RX ADMIN — Medication 40 MILLIEQUIVALENT(S): at 13:22

## 2023-06-27 RX ADMIN — Medication 3 MILLILITER(S): at 06:59

## 2023-06-27 RX ADMIN — OXYCODONE HYDROCHLORIDE 10 MILLIGRAM(S): 5 TABLET ORAL at 10:30

## 2023-06-27 RX ADMIN — OXYCODONE HYDROCHLORIDE 10 MILLIGRAM(S): 5 TABLET ORAL at 19:56

## 2023-06-27 RX ADMIN — OXYCODONE HYDROCHLORIDE 10 MILLIGRAM(S): 5 TABLET ORAL at 23:11

## 2023-06-27 RX ADMIN — Medication 3 MILLILITER(S): at 13:23

## 2023-06-27 RX ADMIN — Medication 2.5 MILLIGRAM(S): at 07:01

## 2023-06-27 RX ADMIN — HEPARIN SODIUM 5000 UNIT(S): 5000 INJECTION INTRAVENOUS; SUBCUTANEOUS at 23:09

## 2023-06-27 RX ADMIN — SODIUM CHLORIDE 4 MILLILITER(S): 9 INJECTION INTRAMUSCULAR; INTRAVENOUS; SUBCUTANEOUS at 23:10

## 2023-06-27 RX ADMIN — OXYCODONE HYDROCHLORIDE 10 MILLIGRAM(S): 5 TABLET ORAL at 18:56

## 2023-06-27 RX ADMIN — Medication 2 MILLIGRAM(S): at 22:21

## 2023-06-27 RX ADMIN — Medication 81 MILLIGRAM(S): at 13:24

## 2023-06-27 RX ADMIN — LACOSAMIDE 200 MILLIGRAM(S): 50 TABLET ORAL at 08:49

## 2023-06-27 RX ADMIN — Medication 975 MILLIGRAM(S): at 07:01

## 2023-06-27 RX ADMIN — SODIUM CHLORIDE 4 MILLILITER(S): 9 INJECTION INTRAMUSCULAR; INTRAVENOUS; SUBCUTANEOUS at 18:56

## 2023-06-27 RX ADMIN — CHLORHEXIDINE GLUCONATE 15 MILLILITER(S): 213 SOLUTION TOPICAL at 07:00

## 2023-06-27 RX ADMIN — CHLORHEXIDINE GLUCONATE 15 MILLILITER(S): 213 SOLUTION TOPICAL at 18:56

## 2023-06-27 RX ADMIN — SODIUM CHLORIDE 4 MILLILITER(S): 9 INJECTION INTRAMUSCULAR; INTRAVENOUS; SUBCUTANEOUS at 00:29

## 2023-06-27 RX ADMIN — Medication 975 MILLIGRAM(S): at 13:23

## 2023-06-27 RX ADMIN — SODIUM CHLORIDE 4 MILLILITER(S): 9 INJECTION INTRAMUSCULAR; INTRAVENOUS; SUBCUTANEOUS at 06:58

## 2023-06-27 RX ADMIN — HEPARIN SODIUM 5000 UNIT(S): 5000 INJECTION INTRAVENOUS; SUBCUTANEOUS at 13:22

## 2023-06-27 RX ADMIN — Medication 3 MILLILITER(S): at 23:10

## 2023-06-27 RX ADMIN — HEPARIN SODIUM 5000 UNIT(S): 5000 INJECTION INTRAVENOUS; SUBCUTANEOUS at 07:00

## 2023-06-27 RX ADMIN — Medication 15 MILLILITER(S): at 13:00

## 2023-06-27 RX ADMIN — Medication 975 MILLIGRAM(S): at 14:23

## 2023-06-27 RX ADMIN — Medication 2.5 MILLIGRAM(S): at 23:11

## 2023-06-27 RX ADMIN — LACOSAMIDE 200 MILLIGRAM(S): 50 TABLET ORAL at 18:56

## 2023-06-27 RX ADMIN — SODIUM CHLORIDE 80 MILLILITER(S): 9 INJECTION, SOLUTION INTRAVENOUS at 08:49

## 2023-06-27 RX ADMIN — PANTOPRAZOLE SODIUM 40 MILLIGRAM(S): 20 TABLET, DELAYED RELEASE ORAL at 13:25

## 2023-06-27 RX ADMIN — Medication 2 MILLIGRAM(S): at 14:29

## 2023-06-27 RX ADMIN — Medication 975 MILLIGRAM(S): at 07:43

## 2023-06-27 RX ADMIN — Medication 975 MILLIGRAM(S): at 19:56

## 2023-06-27 RX ADMIN — Medication 3 MILLILITER(S): at 18:56

## 2023-06-27 RX ADMIN — Medication 975 MILLIGRAM(S): at 18:56

## 2023-06-27 RX ADMIN — Medication 1 DROP(S): at 07:44

## 2023-06-27 RX ADMIN — OXYCODONE HYDROCHLORIDE 10 MILLIGRAM(S): 5 TABLET ORAL at 09:30

## 2023-06-27 RX ADMIN — Medication 2.5 MILLIGRAM(S): at 13:22

## 2023-06-27 RX ADMIN — Medication 975 MILLIGRAM(S): at 00:25

## 2023-06-27 RX ADMIN — Medication 975 MILLIGRAM(S): at 23:10

## 2023-06-27 RX ADMIN — OXYCODONE HYDROCHLORIDE 10 MILLIGRAM(S): 5 TABLET ORAL at 23:30

## 2023-06-27 NOTE — DISCHARGE NOTE PROVIDER - NSDCCPTREATMENT_GEN_ALL_CORE_FT
PRINCIPAL PROCEDURE  Procedure: Second stage thoracic endovascular aortic repair (TEVAR)  Findings and Treatment:       SECONDARY PROCEDURE  Procedure: Exploratory laparotomy  Findings and Treatment:      PRINCIPAL PROCEDURE  Procedure: Second stage thoracic endovascular aortic repair (TEVAR)  Findings and Treatment:       SECONDARY PROCEDURE  Procedure: IVC filter placement  Findings and Treatment:     Procedure: Bronchoscopy  Findings and Treatment:     Procedure: Jejunostomy feeding tube placement  Findings and Treatment:     Procedure: Percutaneous needle tracheostomy  Findings and Treatment:     Procedure: EGD  Findings and Treatment:     Procedure: Exploratory laparotomy  Findings and Treatment:     Procedure: Drainage of pancreatic abscess  Findings and Treatment:

## 2023-06-27 NOTE — PROGRESS NOTE ADULT - ASSESSMENT
53M w PMHx of HTN, type A aortic dissection s/p Dacron grafts, AV resuspension in 2013, CAD s/p CABG x 1 SVG to RCA (5/2013 with Dr. Medina), seizure disorder, recent admission 3/20-4/14 for replacement of transverse aortic arch, second stage TEVAR and aorto-axillary bypass, AV replacement (bio 23mm), and CABG x 1 (SVG-RCA). Pt found to have endo leak and taken to OR for TEVAR revision on 5/5. Post op course c/b Klebsiella bacteremia (5/15), resp failure requiring intubation on 5/18, Mucus plugging s/p Bronch 5/19 and PEA arrest. Post operatively pt also found to have hemorrhagic pancreatitis with venu-pancreatic abscess possibly 2* to Depakote therefore s/p IR aspiration of peripancreatic fluid collection and paracentesis on 5/22, IR venu-pancreatic abscess drainage and placement of drainage catheter on 5/24. Pt then transferred from CTICU to SICU for septic shock, and further mgmt of hemorrhagic pancreatitis on 5/25. s/p IR placement of 2 new drainage catheters and catheter exchange on 5/26. LUQ drainage 5/30. OR 5/31 for exlap washout & replacement of 3 new JPs and abthera vac with open abdomen. RTOR 6/1, no bleeding, evac of old blood, placement of feeding J-tube. failed extubation s/p trach 6/5 with pneumonia.    NEURO: Pain control: wean OxyIR 15q6h with dilaudid 1mg q4 PRN, off ketamine (6/14). Hx seizures: epilepsy recs appreciated, Cont vimpat. Depakote weaned off due to concerns for drug-related hemorrhagic pancreatitis. Repeat EEG (ordered).  HEENT: Artificial tears, Torticollis - PT/OT following, improving.   CV: s/p PEA arrest on 5/24 night. TTE (6/14) LVEF 75%, LVH, biatrial enlargement and elevated PA pressure (elevated from previous), mild to mod MR/TR . C/w ASA 81mg. Metoprolol 2.5q6. Hold diuresis today.  PULM: ARDS resolved - off NO, s/p multiple Mucus plug/ Lung collapse s/p bronch x3 (most recently on 5/26) most likely from outward obstruction: Cont rotating pt around the clock. Cont Duonebs, 3% saline. failed extubation 6/3: s/p Trach 6/5: Tolerating 40% Trach Collar, SLP placed passy sindi valve.   GI/FEN: TF Vital 1.0 at 70cc via feeding J-tube with imodium 2mg BID, Protonix BID, Hemorrhagic pancreatitis: s/p multiple drain placements and paracentisis by IR team. Cdiff negative (6/19).  : AKF s/p CVVHD & iHD, now w/ renal recovery, off dialysis and aquaphoresis, voids via condom catheter.  HEME: Acute blood loss anemia requiring multiple transfusions  ENDO: mISS  ID: Recurrent fevers with Recent CT A/P without significant intra-abdominal pathology, likely respiratory etiology given secretions and Sputum growing kleb and enterobacter on 6/17. One more rasta of erta: last Caspo (6/18-20, Erta (6/18-24), vanc (6/18-21). If febrile, would need to do fever work-up and discuss w ID re: restarting ABX.  PRIOR ABX: Ertapenem (6/13-6/16), meropenem (6/9-6/13), vanco x 1 (6/9, 6/18-6/22), caspofungin (6/9-6/12, 6/18-6/21), previous Kleb bacteremia from 5/15 & 5/17, subsequent bld cx negative, PNA w/ bronch cx kleb, enterobacter (zosyn, erta resistant), E faecalis, strep angionosus from 5/19, pancreatic abscess cx pan-sensitive kleb 5/22. completed Ceftriaxone( 6/5- 6/15) Chloe (5/21-6/5),  Caspo (5/23-5/30), Ampicillin (5/21- 5/27).   PPX: SCDs, SQH  LINES: PIVs // L rad kalpana (5/31-6/15), 5Fr PIV in LUE (6/13-15), Lsubclav HD Cath (5/31-6/12), RIJ TLC (5/22-5/27), RIJ HD cath (5/22-31), R Ax kalpana(5/12-5/31), LIJ TLC (5/23-6/1), RIJ TLC (6/1-13)   WOUNDS/DRAINS: left OR drain x1, J tube x1  PT: PT/OT  Dispo: Telemetry 53M w PMHx of HTN, type A aortic dissection s/p Dacron grafts, AV resuspension in 2013, CAD s/p CABG x 1 SVG to RCA (5/2013 with Dr. Medina), seizure disorder, recent admission 3/20-4/14 for replacement of transverse aortic arch, second stage TEVAR and aorto-axillary bypass, AV replacement (bio 23mm), and CABG x 1 (SVG-RCA). Pt found to have endo leak and taken to OR for TEVAR revision on 5/5. Post op course c/b Klebsiella bacteremia (5/15), resp failure requiring intubation on 5/18, Mucus plugging s/p Bronch 5/19 and PEA arrest. Post operatively pt also found to have hemorrhagic pancreatitis with venu-pancreatic abscess possibly 2* to Depakote therefore s/p IR aspiration of peripancreatic fluid collection and paracentesis on 5/22, IR venu-pancreatic abscess drainage and placement of drainage catheter on 5/24. Pt then transferred from CTICU to SICU for septic shock, and further mgmt of hemorrhagic pancreatitis on 5/25. s/p IR placement of 2 new drainage catheters and catheter exchange on 5/26. LUQ drainage 5/30. OR 5/31 for exlap washout & replacement of 3 new JPs and abthera vac with open abdomen. RTOR 6/1, no bleeding, evac of old blood, placement of feeding J-tube. failed extubation s/p trach 6/5 with pneumonia.    NEURO: Pain control: wean OxyIR 15q6h with dilaudid 1mg q4 PRN, off ketamine (6/14). Hx seizures: epilepsy recs appreciated, Cont vimpat. Depakote weaned off due to concerns for drug-related hemorrhagic pancreatitis. Repeat EEG 6/26: no seizures or significant clinical events  HEENT: Artificial tears, Torticollis - PT/OT following, improving.   CV: s/p PEA arrest on 5/24 night. TTE (6/14) LVEF 75%, LVH, biatrial enlargement and elevated PA pressure (elevated from previous), mild to mod MR/TR . C/w ASA 81mg. Metoprolol 2.5q6. Hold diuresis today.  PULM: ARDS resolved - off NO, s/p multiple Mucus plug/ Lung collapse s/p bronch x3 (most recently on 5/26) most likely from outward obstruction: Cont rotating pt around the clock. Cont Duonebs, 3% saline. failed extubation 6/3: s/p Trach 6/5: Tolerating 40% Trach Collar, SLP placed passy sindi valve. Plan to decannulate today  GI/FEN: TF Vital 1.0 at 70cc via feeding J-tube with imodium 2mg BID, Protonix BID, Hemorrhagic pancreatitis: s/p multiple drain placements and paracentesis by IR team. Cdiff negative (6/19).  : AKF s/p CVVHD & iHD, now w/ renal recovery, off dialysis and aquaphoresis, voids via condom catheter.  HEME: Acute blood loss anemia requiring multiple transfusions  ENDO: mISS  ID: Recurrent fevers with Recent CT A/P without significant intra-abdominal pathology, likely respiratory etiology given secretions and Sputum growing kleb and enterobacter on 6/17. One more rasta of erta: last Caspo (6/18-20, Erta (6/18-24), vanc (6/18-21). If febrile, would need to do fever work-up and discuss w ID re: restarting ABX.  PRIOR ABX: Ertapenem (6/13-6/16), meropenem (6/9-6/13), vanco x 1 (6/9, 6/18-6/22), caspofungin (6/9-6/12, 6/18-6/21), previous Kleb bacteremia from 5/15 & 5/17, subsequent bld cx negative, PNA w/ bronch cx kleb, enterobacter (zosyn, erta resistant), E faecalis, strep angionosus from 5/19, pancreatic abscess cx pan-sensitive kleb 5/22. completed Ceftriaxone( 6/5- 6/15) Chloe (5/21-6/5),  Caspo (5/23-5/30), Ampicillin (5/21- 5/27).   PPX: SCDs, SQH  LINES: PIVs // L rad kalpana (5/31-6/15), 5Fr PIV in LUE (6/13-15), Lsubclav HD Cath (5/31-6/12), RIJ TLC (5/22-5/27), RIJ HD cath (5/22-31), R Ax kalpana(5/12-5/31), LIJ TLC (5/23-6/1), RIJ TLC (6/1-13)   WOUNDS/DRAINS: left OR drain x1, J tube x1  PT: PT/OT  Dispo: Telemetry

## 2023-06-27 NOTE — DISCHARGE NOTE PROVIDER - NSDCMRMEDTOKEN_GEN_ALL_CORE_FT
acetaminophen 325 mg oral tablet: 2 tab(s) orally every 6 hours  Aspirin Enteric Coated 81 mg oral delayed release tablet: 1 tab(s) orally once a day  atorvastatin 20 mg oral tablet: 1 tab(s) orally once a day  divalproex sodium 500 mg oral tablet, extended release: 2 tab(s) orally every 12 hours  lacosamide 100 mg oral tablet: 1 tab(s) orally 2 times a day MDD: 2  pantoprazole 40 mg oral delayed release tablet: 1 tab(s) orally once a day (before a meal)  Toprol- mg oral tablet, extended release: 1 tab(s) orally once a day   amLODIPine 10 mg oral tablet: 1 tab(s) orally once a day  Aspirin Enteric Coated 81 mg oral delayed release tablet: 1 tab(s) orally once a day  atorvastatin 20 mg oral tablet: 1 tab(s) orally once a day  calamine topical lotion: Apply topically to affected area every 12 hours apply to buttocks  lacosamide 200 mg oral tablet: 1 tab(s) orally every 12 hours MDD: 2 tabs  Multiple Vitamins oral tablet: 1 tab(s) orally once a day  pantoprazole 40 mg oral delayed release tablet: 1 tab(s) orally once a day (before a meal)  Toprol- mg oral tablet, extended release: 1 tab(s) orally once a day

## 2023-06-27 NOTE — PROGRESS NOTE ADULT - SUBJECTIVE AND OBJECTIVE BOX
INTERVAL HPI/OVERNIGHT EVENTS: voided 300, PVR 68, no coulter placed, RP US prelim read: Left kidney not visualized, echogenic right renal parenchyma, consistent with medical renal disease. No calculi or hydronephrosis, right pleural effusion and small ascites.    STATUS POST:    5/5: second stage TEVAR  5/13: IR Celiac, SMA, splenic, and left gastric artery angiogram reveals no pseudoaneurysm or any active bleeding.  5/22:  IR Aspiration of left peripancreatic fluid collection  (15cc sanguinous) and paracentesis (4L clear yellow fluid)  5/24: IR L peripancreatic abscess collection drainage and placement of 12F drainage catheter  5/25: Rosalee upsized existing abscess drain to 16F, placed new drain to another abscess collection on left, and two drain for ascites.  5/26: Placement of two new drainage catheters and exchange of two   drainage catheters  5/30: IR LUQ drainage of 400cc purulent fluid  5/31: ex lap, wash out of ascites/old blood/scant new blood, all CODIE drain removal, CODIE x3 placement, abdomen open, UOP 0, EBL??  6/1: No bleeding, evac of old blood, placement of feeding J-tube, abd closure  6/5: Bedside trach (Pulm)  6/9: Bronchoscopy, lavage    SUBJECTIVE: Pt seen and examined at bedside this am by surgery team. Appears comfortable laying in bed. Trach, unable to obtain history however follows commands.    MEDICATIONS  (STANDING):  acetaminophen   Oral Liquid .. 975 milliGRAM(s) Enteral Tube every 6 hours  albuterol/ipratropium for Nebulization 3 milliLiter(s) Nebulizer every 6 hours  artificial  tears Solution 1 Drop(s) Both EYES two times a day  aspirin  chewable 81 milliGRAM(s) Oral daily  chlorhexidine 0.12% Liquid 15 milliLiter(s) Oral Mucosa every 12 hours  dextrose 5% + sodium chloride 0.45%. 1000 milliLiter(s) (80 mL/Hr) IV Continuous <Continuous>  dextrose 5%. 1000 milliLiter(s) (100 mL/Hr) IV Continuous <Continuous>  dextrose 50% Injectable 25 Gram(s) IV Push once  glucagon  Injectable 1 milliGRAM(s) IntraMuscular once  heparin   Injectable 5000 Unit(s) SubCutaneous every 8 hours  insulin lispro (ADMELOG) corrective regimen sliding scale   SubCutaneous every 6 hours  lacosamide Solution 200 milliGRAM(s) Oral two times a day  loperamide Liquid 2 milliGRAM(s) Enteral Tube every 8 hours  metoprolol tartrate Injectable 2.5 milliGRAM(s) IV Push every 6 hours  multivitamin/minerals/iron Oral Solution (CENTRUM) 15 milliLiter(s) Oral daily  pantoprazole  Injectable 40 milliGRAM(s) IV Push daily  sodium chloride 3%  Inhalation 4 milliLiter(s) Inhalation every 6 hours    MEDICATIONS  (PRN):  dextrose Oral Gel 15 Gram(s) Oral once PRN Blood Glucose LESS THAN 70 milliGRAM(s)/deciliter  HYDROmorphone  Injectable 1 milliGRAM(s) IV Push every 4 hours PRN breakthrough pain  oxyCODONE    Solution 15 milliGRAM(s) Oral every 6 hours PRN Severe Pain (7 - 10)  sodium chloride 0.9% lock flush 10 milliLiter(s) IV Push every 1 hour PRN Pre/post blood products, medications, blood draw, and to maintain line patency    Vital Signs Last 24 Hrs  T(C): 37.7 (27 Jun 2023 04:51), Max: 37.7 (27 Jun 2023 04:51)  T(F): 99.8 (27 Jun 2023 04:51), Max: 99.8 (27 Jun 2023 04:51)  HR: 108 (27 Jun 2023 04:10) (80 - 108)  BP: 132/70 (27 Jun 2023 04:10) (125/69 - 144/83)  BP(mean): 95 (27 Jun 2023 04:10) (91 - 108)  RR: 18 (27 Jun 2023 04:10) (17 - 20)  SpO2: 98% (27 Jun 2023 04:10) (98% - 100%)    Parameters below as of 27 Jun 2023 04:10  Patient On (Oxygen Delivery Method): tracheostomy collar  O2 Flow (L/min): 10  O2 Concentration (%): 40    PHYSICAL EXAM:    Constitutional: A&Ox3, NAD    Respiratory: non labored breathing, no respiratory distress    Cardiovascular: NSR, RRR    Gastrointestinal: abdomen soft, nd, appropriately ttp to surgical site. J tube and CODIE drain in place. CODIE draining serous OP.    Extremities: wwp, no calf tenderness or edema. SCDs in place       I&O's Detail    26 Jun 2023 07:01  -  27 Jun 2023 07:00  --------------------------------------------------------  IN:    dextrose 5% + sodium chloride 0.45%: 1760 mL    Enteral Tube Flush: 340 mL    Vital1.0: 1260 mL  Total IN: 3360 mL    OUT:    Bulb (mL): 0 mL    Bulb (mL): 35 mL    Drain (mL): 0 mL    Drain (mL): 0 mL    Incontinent per Condom Catheter (mL): 580 mL    Voided (mL): 450 mL  Total OUT: 1065 mL    Total NET: 2295 mL          LABS:                        7.2    12.12 )-----------( 549      ( 27 Jun 2023 05:30 )             23.2     06-27    140  |  106  |  15  ----------------------------<  91  3.6   |  25  |  0.55    Ca    7.9<L>      27 Jun 2023 05:30  Phos  3.9     06-27  Mg     2.0     06-27      PT/INR - ( 26 Jun 2023 06:58 )   PT: 14.5 sec;   INR: 1.22          PTT - ( 26 Jun 2023 06:58 )  PTT:38.5 sec  Urinalysis Basic - ( 27 Jun 2023 05:30 )    Color: x / Appearance: x / SG: x / pH: x  Gluc: 91 mg/dL / Ketone: x  / Bili: x / Urobili: x   Blood: x / Protein: x / Nitrite: x   Leuk Esterase: x / RBC: x / WBC x   Sq Epi: x / Non Sq Epi: x / Bacteria: x        RADIOLOGY & ADDITIONAL STUDIES:

## 2023-06-27 NOTE — DISCHARGE NOTE PROVIDER - PROVIDER TOKENS
PROVIDER:[TOKEN:[[id number]],FOLLOWUP:[2 weeks]],PROVIDER:[TOKEN:[[id number]]] PROVIDER:[TOKEN:[[id number]],FOLLOWUP:[2 weeks]],PROVIDER:[TOKEN:[[id number]]],PROVIDER:[TOKEN:[[id number]]]

## 2023-06-27 NOTE — DISCHARGE NOTE PROVIDER - NSDCCPCAREPLAN_GEN_ALL_CORE_FT
PRINCIPAL DISCHARGE DIAGNOSIS  Diagnosis: Hemorrhagic pancreatitis  Assessment and Plan of Treatment:       SECONDARY DISCHARGE DIAGNOSES  Diagnosis: Cardiac arrest with pulseless electrical activity  Assessment and Plan of Treatment:     Diagnosis: Bacteremia due to Klebsiella pneumoniae  Assessment and Plan of Treatment:     Diagnosis: Pneumonia, aspiration  Assessment and Plan of Treatment:     Diagnosis: Bilateral pulmonary embolism  Assessment and Plan of Treatment:     Diagnosis: Pneumatosis intestinalis  Assessment and Plan of Treatment:      PRINCIPAL DISCHARGE DIAGNOSIS  Diagnosis: Hemorrhagic pancreatitis  Assessment and Plan of Treatment: Follow up with Dr. Solano in 1-2 weeks. Call the office at the number below to schedule your appointment. You may shower; soap and water over incision sites. Do not scrub. Pat dry when done. No tub bathing or swimming until cleared. Ambulate as tolerated, but no heavy lifting (>10lbs) or strenuous exercise. You may resume regular diet. You should be urinating at least 3-4x per day. Call the office if you experience increasing abdominal pain, nausea, vomiting, or temperature >101 F.      SECONDARY DISCHARGE DIAGNOSES  Diagnosis: Cardiac arrest with pulseless electrical activity  Assessment and Plan of Treatment:     Diagnosis: Bacteremia due to Klebsiella pneumoniae  Assessment and Plan of Treatment:     Diagnosis: Pneumonia, aspiration  Assessment and Plan of Treatment:     Diagnosis: Bilateral pulmonary embolism  Assessment and Plan of Treatment:     Diagnosis: Pneumatosis intestinalis  Assessment and Plan of Treatment:

## 2023-06-27 NOTE — DISCHARGE NOTE PROVIDER - NSDCHHATTENDCERT_GEN_ALL_CORE
TOV
My signature below certifies that the above stated patient is homebound and upon completion of the Face-To-Face encounter, has the need for intermittent skilled nursing, physical therapy and/or speech or occupational therapy services in their home for their current diagnosis as outlined in their initial plan of care. These services will continue to be monitored by myself or another physician.

## 2023-06-27 NOTE — DISCHARGE NOTE PROVIDER - HOSPITAL COURSE
51 y/o Male, current every day marijuana use, w/ PMHx of HTN, type A aortic dissection s/p Dacron grafts, AV resuspension in 2013, CAD s/p CABG x 1 SVG to RCA (5/2013 with Dr. Medina), seizure disorder (last episode on 7/4/22) who was admitted for surgical mgmt of progression of aneurysmal disease. Recent admission 3/20-4/14 during which patient underwent replacement of transverse aortic arch, second stage thoracic endovascular aortic repair, aorto-axillary bypass, AV replacement (bio 23mm), and CABG x 1 (SVG-RCA) EF 75% (Ignacio, 3/20). Post op cb lactic acidosis, severe cardiogenic and vasogenic shock, TREY requiring CVVHD and temporary dialysis requirement. Patient presented to [facility] ED 4/27 for syncope vs seizure episode at home, falling onto back of head. Nuerological workup performed during that visit revealed a negative EEG, but given clinical picture of seizures, pt started on vimpat and depakote. Imaging preformed during that admission did no require acute surgical intervention on endoleak.   Pt presented to [facility] ED on 5/4 complaning of worsening epigastric pain. CTAP revealed continued endoleak. Sent to [facility] for further evaluation. On 5/5, pt taken to the OR, and underwent TEVAR revision. On 5/13, pt underwent Celiac, SMA, splenic and left gastric angiogram which revealed no pseudoaneurysm or any active bleeding. Post op course c/b Klebsiella bacteremia (5/15), resp failure requiring intubation on 5/18, Mucus plugging s/p Bronch 5/19 and PEA arrest. Post operatively pt also found to have hemorrhagic pancreatitis with venu-pancreatic abscess possibly 2* to Depakote therefore s/p IR aspiration of peripancreatic fluid collection and paracentesis on 5/22, IR venu-pancreatic abscess drainage and placement of drainage catheter on 5/24. Pt then transferred from CTICU to SICU for septic shock, and further mgmt of hemorrhagic pancreatitis on 5/25. s/p IR placement of 2 new drainage catheters and catheter exchange on 5/26. LUQ drainage 5/30. OR 5/31 for exlap washout & replacement of 3 new JPs and abthera vac with open abdomen. RTOR 6/1, no bleeding, evac of old blood, placement of feeding J-tube. failed extubation s/p trach 6/5 with pneumonia. Repeat bronchoscopy on 6/9 with lavage. On 6/11, Aquaphoresis stopped per renal. bronch cx kleb and enterobacter, pt was on Meropenem and CTX. 6/13, TF reached goal. On 6/14, switched to trach collar. 6/16, CT abd/pl done without new collections. On 6/17, Caspo started for Yeast on SputCx. On 6/24, pt stepdown to telemetry. Rectal tube d/c'd.  On 6/24, 1 IR drain removed. On 6/26, 2 additional IR drains removed and Left RP OR drain removed. On 6/27, S/S to reeval and pulm c/s to decannulate.     LAST UPDATED ON 6/27***** 53 y/o Male, current every day marijuana use, w/ PMHx of HTN, type A aortic dissection s/p Dacron grafts, AV resuspension in 2013, CAD s/p CABG x 1 SVG to RCA (5/2013 with Dr. Medina), seizure disorder (last episode on 7/4/22) who was admitted for surgical mgmt of progression of aneurysmal disease. Recent admission 3/20-4/14 during which patient underwent replacement of transverse aortic arch, second stage thoracic endovascular aortic repair, aorto-axillary bypass, AV replacement (bio 23mm), and CABG x 1 (SVG-RCA) EF 75% (Ignacio, 3/20). Post op cb lactic acidosis, severe cardiogenic and vasogenic shock, TREY requiring CVVHD and temporary dialysis requirement. Patient presented to [facility] ED 4/27 for syncope vs seizure episode at home, falling onto back of head. Nuerological workup performed during that visit revealed a negative EEG, but given clinical picture of seizures, pt started on vimpat and depakote. Imaging preformed during that admission did no require acute surgical intervention on endoleak.   Pt presented to [facility] ED on 5/4 complaning of worsening epigastric pain. CTAP revealed continued endoleak. Sent to [facility] for further evaluation. On 5/5, pt taken to the OR, and underwent TEVAR revision. On 5/13, pt underwent Celiac, SMA, splenic and left gastric angiogram which revealed no pseudoaneurysm or any active bleeding. Post op course c/b Klebsiella bacteremia (5/15), resp failure requiring intubation on 5/18, Mucus plugging s/p Bronch 5/19 and PEA arrest. Post operatively pt also found to have hemorrhagic pancreatitis with venu-pancreatic abscess possibly 2* to Depakote therefore s/p IR aspiration of peripancreatic fluid collection and paracentesis on 5/22, IR venu-pancreatic abscess drainage and placement of drainage catheter on 5/24. Pt then transferred from CTICU to SICU for septic shock, and further mgmt of hemorrhagic pancreatitis on 5/25. s/p IR placement of 2 new drainage catheters and catheter exchange on 5/26. LUQ drainage 5/30. OR 5/31 for exlap washout & replacement of 3 new JPs and abthera vac with open abdomen. RTOR 6/1, no bleeding, evac of old blood, placement of feeding J-tube. failed extubation s/p trach 6/5 with pneumonia. Repeat bronchoscopy on 6/9 with lavage. On 6/11, Aquaphoresis stopped per renal. bronch cx kleb and enterobacter, pt was on Meropenem and CTX. 6/13, TF reached goal. On 6/14, switched to trach collar. 6/16, CT abd/pl done without new collections. On 6/17, Caspo started for Yeast on SputCx. On 6/24, pt stepdown to telemetry. Rectal tube d/c'd.  On 6/24, 1 IR drain removed. On 6/26, 2 additional IR drains removed and Left RP OR drain removed. On 6/27, S/S to reeval and pulm c/s to decannulate. S/S recommended soft diet with thin liquids. Tube feed held and diet was given. Pulmonology decannulated him on 6/28. abd CODIE drain removed. Pt vomited twice. On 6/30 hgb noted 6.5- 1 unit given. CT ab/pl done on suggestive of free air likely 2/2 to tube removal, multiple pancr collections slightly increased, moderate b/l pleural effusions, periportal edema, gastric wall thickening, stable aortic dissection. On 7/1, pt was septic and saturating 87% on RA. CT c/a/p suggestive of NEW large area of groundglass density involving the right upper and middle lobe suspicious for acute infectious inflammatory process. Pt transferred to SICU and was intubated for respiratory distress. On 7/4, Sputum growing pseudomonas.       LAST UPDATED ON 7/5***** 53 y/o Male, current every day marijuana use, w/ PMHx of HTN, type A aortic dissection s/p Dacron grafts, AV resuspension in 2013, CAD s/p CABG x 1 SVG to RCA (5/2013 with Dr. Medina), seizure disorder (last episode on 7/4/22) who was admitted for surgical mgmt of progression of aneurysmal disease. Recent admission 3/20-4/14 during which patient underwent replacement of transverse aortic arch, second stage thoracic endovascular aortic repair, aorto-axillary bypass, AV replacement (bio 23mm), and CABG x 1 (SVG-RCA) EF 75% (Ignacio, 3/20). Post op cb lactic acidosis, severe cardiogenic and vasogenic shock, TREY requiring CVVHD and temporary dialysis requirement. Patient presented to [facility] ED 4/27 for syncope vs seizure episode at home, falling onto back of head. Nuerological workup performed during that visit revealed a negative EEG, but given clinical picture of seizures, pt started on vimpat and depakote. Imaging preformed during that admission did no require acute surgical intervention on endoleak.   Pt presented to [facility] ED on 5/4 complaning of worsening epigastric pain. CTAP revealed continued endoleak. Sent to [facility] for further evaluation. On 5/5, pt taken to the OR, and underwent TEVAR revision. On 5/13, pt underwent Celiac, SMA, splenic and left gastric angiogram which revealed no pseudoaneurysm or any active bleeding. Post op course c/b Klebsiella bacteremia (5/15), resp failure requiring intubation on 5/18, Mucus plugging s/p Bronch 5/19 and PEA arrest. Post operatively pt also found to have hemorrhagic pancreatitis with venu-pancreatic abscess possibly 2* to Depakote therefore s/p IR aspiration of peripancreatic fluid collection and paracentesis on 5/22, IR venu-pancreatic abscess drainage and placement of drainage catheter on 5/24. Pt then transferred from CTICU to SICU for septic shock, and further mgmt of hemorrhagic pancreatitis on 5/25. s/p IR placement of 2 new drainage catheters and catheter exchange on 5/26. LUQ drainage 5/30. OR 5/31 for exlap washout & replacement of 3 new JPs and abthera vac with open abdomen. RTOR 6/1, no bleeding, evac of old blood, placement of feeding J-tube. failed extubation s/p trach 6/5 with pneumonia. Repeat bronchoscopy on 6/9 with lavage. On 6/11, Aquaphoresis stopped per renal. bronch cx kleb and enterobacter, pt was on Meropenem and CTX. 6/13, TF reached goal. On 6/14, switched to trach collar. 6/16, CT abd/pl done without new collections. On 6/17, Caspo started for Yeast on SputCx. On 6/24, pt stepdown to telemetry. Rectal tube d/c'd.  On 6/24, 1 IR drain removed. On 6/26, 2 additional IR drains removed and Left RP OR drain removed. On 6/27, S/S to reeval and pulm c/s to decannulate. S/S recommended soft diet with thin liquids. Tube feed held and diet was given. Pulmonology decannulated him on 6/28. abd CODIE drain removed. Pt vomited twice. On 6/30 hgb noted 6.5- 1 unit given. CT ab/pl done on suggestive of free air likely 2/2 to tube removal, multiple pancr collections slightly increased, moderate b/l pleural effusions, periportal edema, gastric wall thickening, stable aortic dissection. On 7/1, pt was septic and saturating 87% on RA. CT c/a/p suggestive of NEW large area of groundglass density involving the right upper and middle lobe suspicious for acute infectious inflammatory process. Pt transferred to SICU and was intubated for respiratory distress. On 7/4, Sputum growing pseudomonas, antiobiotics adjusted from meropenem and vancomycin to zosyn. On 7/8 patient's coulter was discontinued and 7/9 patient was able to be extubated. On 7/11 speech and swallow recommended a miced and moist diet. On 7/13 patient was able to step down from SICU to a telemetry floor.       LAST UPDATED ON 7/14***** 53 y/o Male, current every day marijuana use, w/ PMHx of HTN, type A aortic dissection s/p Dacron grafts, AV resuspension in 2013, CAD s/p CABG x 1 SVG to RCA (5/2013 with Dr. Medina), seizure disorder (last episode on 7/4/22) who was admitted for surgical mgmt of progression of aneurysmal disease. Recent admission 3/20-4/14 during which patient underwent replacement of transverse aortic arch, second stage thoracic endovascular aortic repair, aorto-axillary bypass, AV replacement (bio 23mm), and CABG x 1 (SVG-RCA) EF 75% (Ignacio, 3/20). Post op cb lactic acidosis, severe cardiogenic and vasogenic shock, TREY requiring CVVHD and temporary dialysis requirement. Patient presented to [facility] ED 4/27 for syncope vs seizure episode at home, falling onto back of head. Neurological workup performed during that visit revealed a negative EEG, but given clinical picture of seizures, pt started on vimpat and depakote. Imaging preformed during that admission did no require acute surgical intervention on endoleak.   Pt presented to [facility] ED on 5/4 complaning of worsening epigastric pain. CTAP revealed continued endoleak. Sent to [facility] for further evaluation. On 5/5, pt taken to the OR, and underwent TEVAR revision. On 5/13, pt underwent Celiac, SMA, splenic and left gastric angiogram which revealed no pseudoaneurysm or any active bleeding. Post op course c/b Klebsiella bacteremia (5/15), resp failure requiring intubation on 5/18, Mucus plugging s/p Bronch 5/19 and PEA arrest. Post operatively pt also found to have hemorrhagic pancreatitis with venu-pancreatic abscess possibly 2* to Depakote therefore s/p IR aspiration of peripancreatic fluid collection and paracentesis on 5/22, IR venu-pancreatic abscess drainage and placement of drainage catheter on 5/24. Pt then transferred from CTICU to SICU for septic shock, and further mgmt of hemorrhagic pancreatitis on 5/25. s/p IR placement of 2 new drainage catheters and catheter exchange on 5/26. LUQ drainage 5/30. OR 5/31 for exlap washout & replacement of 3 new JPs and abthera vac with open abdomen. RTOR 6/1, no bleeding, evac of old blood, placement of feeding J-tube. failed extubation s/p trach 6/5 with pneumonia. Repeat bronchoscopy on 6/9 with lavage. On 6/11, Aquaphoresis stopped per renal. bronch cx kleb and enterobacter, pt was on Meropenem and CTX. 6/13, TF reached goal. On 6/14, switched to trach collar. 6/16, CT abd/pl done without new collections. On 6/17, Caspo started for Yeast on SputCx. On 6/24, pt stepdown to telemetry. Rectal tube d/c'd.  On 6/24, 1 IR drain removed. On 6/26, 2 additional IR drains removed and Left RP OR drain removed. On 6/27, S/S to reeval and pulm c/s to decannulate. S/S recommended soft diet with thin liquids. Tube feed held and diet was given. Pulmonology decannulated him on 6/28. abd CODIE drain removed. Pt vomited twice. On 6/30 hgb noted 6.5- 1 unit given. CT ab/pl done on suggestive of free air likely 2/2 to tube removal, multiple pancr collections slightly increased, moderate b/l pleural effusions, periportal edema, gastric wall thickening, stable aortic dissection. On 7/1, pt was septic and saturating 87% on RA. CT c/a/p suggestive of NEW large area of groundglass density involving the right upper and middle lobe suspicious for acute infectious inflammatory process. Pt transferred to SICU and was intubated for respiratory distress. On 7/4, Sputum growing pseudomonas, antiobiotics adjusted from meropenem and vancomycin to zosyn. On 7/8 patient's coulter was discontinued and 7/9 patient was able to be extubated. On 7/11 speech and swallow recommended a miced and moist diet. On 7/13 patient was able to step down from SICU to a telemetry floor. On 7/17, patient became hypoxic needing to be place on BiPAP to maintain adequate oxygenation. CT Chest Angio, Abd and Pelvis showed multiple old PE seen since June scans, multiple infarcts in both kidneys, possible ischemic cecum, and left upper lobe infiltrate. Patient was given laxis and started on an Heparin drip. He was able to be progressed down to nasal canal then room air with adequate oxygenation.       LAST UPDATED ON 7/18***** 53 y/o Male, current every day marijuana use, w/ PMHx of HTN, type A aortic dissection s/p Dacron grafts, AV resuspension in 2013, CAD s/p CABG x 1 SVG to RCA (5/2013 with Dr. Medina), seizure disorder (last episode on 7/4/22) who was admitted for surgical mgmt of progression of aneurysmal disease. Recent admission 3/20-4/14 during which patient underwent replacement of transverse aortic arch, second stage thoracic endovascular aortic repair, aorto-axillary bypass, AV replacement (bio 23mm), and CABG x 1 (SVG-RCA) EF 75% (Ignacio, 3/20). Post op cb lactic acidosis, severe cardiogenic and vasogenic shock, TREY requiring CVVHD and temporary dialysis requirement. Patient presented to [facility] ED 4/27 for syncope vs seizure episode at home, falling onto back of head. Neurological workup performed during that visit revealed a negative EEG, but given clinical picture of seizures, pt started on vimpat and depakote. Imaging preformed during that admission did no require acute surgical intervention on endoleak.   Pt presented to [facility] ED on 5/4 complaning of worsening epigastric pain. CTAP revealed continued endoleak. Sent to [facility] for further evaluation. On 5/5, pt taken to the OR, and underwent TEVAR revision. On 5/13, pt underwent Celiac, SMA, splenic and left gastric angiogram which revealed no pseudoaneurysm or any active bleeding. Post op course c/b Klebsiella bacteremia (5/15), resp failure requiring intubation on 5/18, Mucus plugging s/p Bronch 5/19 and PEA arrest. Post operatively pt also found to have hemorrhagic pancreatitis with venu-pancreatic abscess possibly 2* to Depakote therefore s/p IR aspiration of peripancreatic fluid collection and paracentesis on 5/22, IR venu-pancreatic abscess drainage and placement of drainage catheter on 5/24. Pt then transferred from CTICU to SICU for septic shock, and further mgmt of hemorrhagic pancreatitis on 5/25. s/p IR placement of 2 new drainage catheters and catheter exchange on 5/26. LUQ drainage 5/30. OR 5/31 for exlap washout & replacement of 3 new JPs and abthera vac with open abdomen. RTOR 6/1, no bleeding, evac of old blood, placement of feeding J-tube. failed extubation s/p trach 6/5 with pneumonia. Repeat bronchoscopy on 6/9 with lavage. On 6/11, Aquaphoresis stopped per renal. bronch cx kleb and enterobacter, pt was on Meropenem and CTX. 6/13, TF reached goal. On 6/14, switched to trach collar. 6/16, CT abd/pl done without new collections. On 6/17, Caspo started for Yeast on SputCx. On 6/24, pt stepdown to telemetry. Rectal tube d/c'd.  On 6/24, 1 IR drain removed. On 6/26, 2 additional IR drains removed and Left RP OR drain removed. On 6/27, S/S to reeval and pulm c/s to decannulate. S/S recommended soft diet with thin liquids. Tube feed held and diet was given. Pulmonology decannulated him on 6/28. abd CODIE drain removed. Pt vomited twice. On 6/30 hgb noted 6.5- 1 unit given. CT ab/pl done on suggestive of free air likely 2/2 to tube removal, multiple pancr collections slightly increased, moderate b/l pleural effusions, periportal edema, gastric wall thickening, stable aortic dissection. On 7/1, pt was septic and saturating 87% on RA. CT c/a/p suggestive of NEW large area of groundglass density involving the right upper and middle lobe suspicious for acute infectious inflammatory process. Pt transferred to SICU and was intubated for respiratory distress. On 7/4, Sputum growing pseudomonas, antiobiotics adjusted from meropenem and vancomycin to zosyn. On 7/8 patient's coulter was discontinued and 7/9 patient was able to be extubated. On 7/11 speech and swallow recommended a miced and moist diet. On 7/13 patient was able to step down from SICU to a telemetry floor. On 7/17, patient became hypoxic needing to be place on BiPAP to maintain adequate oxygenation. He was able to be progressed down to nasal canal then room air with adequate oxygenation. CT Chest Angio, Abd and Pelvis showed multiple old PE seen since June scans, multiple infarcts in both kidneys, possible ischemic cecum, and left upper lobe infiltrate. Patient was given laxis and started on an Heparin drip. He had a bloody BM the next day, was taken for an IVC filter and taken off heparin IV.      LAST UPDATED ON 7/24**** 53 y/o Male, current every day marijuana use, w/ PMHx of HTN, type A aortic dissection s/p Dacron grafts, AV resuspension in 2013, CAD s/p CABG x 1 SVG to RCA (5/2013 with Dr. Medina), seizure disorder (last episode on 7/4/22) who was admitted for surgical mgmt of progression of aneurysmal disease. Recent admission 3/20-4/14 during which patient underwent replacement of transverse aortic arch, second stage thoracic endovascular aortic repair, aorto-axillary bypass, AV replacement (bio 23mm), and CABG x 1 (SVG-RCA) EF 75% (Ignacio, 3/20). Post op cb lactic acidosis, severe cardiogenic and vasogenic shock, TREY requiring CVVHD and temporary dialysis requirement. Patient presented to [facility] ED 4/27 for syncope vs seizure episode at home, falling onto back of head. Neurological workup performed during that visit revealed a negative EEG, but given clinical picture of seizures, pt started on vimpat and depakote. Imaging preformed during that admission did no require acute surgical intervention on endoleak.   Pt presented to [facility] ED on 5/4 complaining of worsening epigastric pain. CTAP revealed continued endoleak. Sent to [facility] for further evaluation. On 5/5, pt taken to the OR, and underwent TEVAR revision. On 5/13, pt underwent Celiac, SMA, splenic and left gastric angiogram which revealed no pseudoaneurysm or any active bleeding. Post op course c/b Klebsiella bacteremia (5/15), resp failure requiring intubation on 5/18, Mucus plugging s/p Bronch 5/19 and PEA arrest. Post operatively pt also found to have hemorrhagic pancreatitis with venu-pancreatic abscess possibly 2* to Depakote therefore s/p IR aspiration of peripancreatic fluid collection and paracentesis on 5/22, IR venu-pancreatic abscess drainage and placement of drainage catheter on 5/24. Pt then transferred from CTICU to SICU for septic shock, and further mgmt of hemorrhagic pancreatitis on 5/25. s/p IR placement of 2 new drainage catheters and catheter exchange on 5/26. LUQ drainage 5/30. OR 5/31 for exlap washout & replacement of 3 new JPs and abthera vac with open abdomen. RTOR 6/1, no bleeding, evac of old blood, placement of feeding J-tube. failed extubation s/p trach 6/5 with pneumonia. Repeat bronchoscopy on 6/9 with lavage. On 6/11, Aquaphoresis stopped per renal. bronch cx kleb and enterobacter, pt was on Meropenem and CTX. 6/13, TF reached goal. On 6/14, switched to trach collar. 6/16, CT abd/pl done without new collections. On 6/17, Caspo started for Yeast on SputCx. On 6/24, pt stepdown to telemetry. Rectal tube d/c'd.  On 6/24, 1 IR drain removed. On 6/26, 2 additional IR drains removed and Left RP OR drain removed. On 6/27, S/S to reeval and pulm c/s to decannulate. S/S recommended soft diet with thin liquids. Tube feed held and diet was given. Pulmonology decannulated him on 6/28. abd CODIE drain removed. Pt vomited twice. On 6/30 hgb noted 6.5- 1 unit given. CT ab/pl done on suggestive of free air likely 2/2 to tube removal, multiple pancr collections slightly increased, moderate b/l pleural effusions, periportal edema, gastric wall thickening, stable aortic dissection. On 7/1, pt was septic and saturating 87% on RA. CT c/a/p suggestive of NEW large area of groundglass density involving the right upper and middle lobe suspicious for acute infectious inflammatory process. Pt transferred to SICU and was intubated for respiratory distress. On 7/4, Sputum growing pseudomonas, antiobiotics adjusted from meropenem and vancomycin to zosyn. On 7/8 patient's coulter was discontinued and 7/9 patient was able to be extubated. On 7/11 speech and swallow recommended a miced and moist diet. On 7/13 patient was able to step down from SICU to a telemetry floor. On 7/17, patient became hypoxic needing to be place on BiPAP to maintain adequate oxygenation. He was able to be progressed down to nasal canal then room air with adequate oxygenation. CT Chest Angio, Abd and Pelvis showed multiple old PE seen since June scans, multiple infarcts in both kidneys, possible ischemic cecum, and left upper lobe infiltrate. Patient was given laxis and started on an Heparin drip. He had a bloody BM the next day, was taken for an IVC filter and taken off heparin IV. Patient was transferred to SICU for further monitoring. Patient was restarted on SQH, ASA, and PO home meds (norvasc and metoprolol) for HTN. Minced and moist diet recommended per speech and swallow.   LAST UPDATED ON 7/24**** 51 y/o Male, current every day marijuana use, w/ PMHx of HTN, type A aortic dissection s/p Dacron grafts, AV resuspension in 2013, CAD s/p CABG x 1 SVG to RCA (5/2013 with Dr. Medina), seizure disorder (last episode on 7/4/22) who was admitted for surgical mgmt of progression of aneurysmal disease. Recent admission 3/20-4/14 during which patient underwent replacement of transverse aortic arch, second stage thoracic endovascular aortic repair, aorto-axillary bypass, AV replacement (bio 23mm), and CABG x 1 (SVG-RCA) EF 75% (Ignacio, 3/20). Post op c/b lactic acidosis, severe cardiogenic and vasogenic shock, TREY requiring CVVHD and temporary dialysis requirement. Patient presented to [facility] ED 4/27 for syncope vs seizure episode at home, falling onto back of head. Neurological workup performed during that visit revealed a negative EEG, but given clinical picture of seizures, pt started on vimpat and depakote. Imaging preformed during that admission did no require acute surgical intervention on endoleak.   Pt presented to [facility] ED on 5/4 complaining of worsening epigastric pain. CTAP revealed continued endoleak. Sent to [facility] for further evaluation. On 5/5, pt taken to the OR, and underwent TEVAR revision. On 5/13, pt underwent Celiac, SMA, splenic and left gastric angiogram which revealed no pseudoaneurysm or any active bleeding. Post op course c/b Klebsiella bacteremia (5/15), resp failure requiring intubation on 5/18, Mucus plugging s/p Bronch 5/19 and PEA arrest. Post operatively pt also found to have hemorrhagic pancreatitis with venu-pancreatic abscess possibly 2* to Depakote therefore s/p IR aspiration of peripancreatic fluid collection and paracentesis on 5/22, IR venu-pancreatic abscess drainage and placement of drainage catheter on 5/24. Pt then transferred from CTICU to SICU for septic shock, and further mgmt of hemorrhagic pancreatitis on 5/25. s/p IR placement of 2 new drainage catheters and catheter exchange on 5/26. LUQ drainage 5/30. OR 5/31 for exlap washout & replacement of 3 new JPs and abthera vac with open abdomen. RTOR 6/1, no bleeding, evac of old blood, placement of feeding J-tube. failed extubation s/p trach 6/5 with pneumonia. Repeat bronchoscopy on 6/9 with lavage. On 6/11, Aquaphoresis stopped per renal. bronch cx kleb and enterobacter, pt was on Meropenem and CTX. 6/13, TF reached goal. On 6/14, switched to trach collar. 6/16, CT abd/pl done without new collections. On 6/17, Caspo started for Yeast on SputCx. On 6/24, pt stepdown to telemetry. Rectal tube d/c'd.  On 6/24, 1 IR drain removed. On 6/26, 2 additional IR drains removed and Left RP OR drain removed. On 6/27, S/S to reeval and pulm c/s to decannulate. S/S recommended soft diet with thin liquids. Tube feed held and diet was given. Pulmonology decannulated him on 6/28. abd CODIE drain removed. Pt vomited twice. On 6/30 hgb noted 6.5- 1 unit given. CT ab/pl done on suggestive of free air likely 2/2 to tube removal, multiple pancr collections slightly increased, moderate b/l pleural effusions, periportal edema, gastric wall thickening, stable aortic dissection. On 7/1, pt was septic and saturating 87% on RA. CT c/a/p suggestive of NEW large area of groundglass density involving the right upper and middle lobe suspicious for acute infectious inflammatory process. Pt transferred to SICU and was intubated for respiratory distress. On 7/4, Sputum growing pseudomonas, antiobiotics adjusted from meropenem and vancomycin to zosyn. On 7/8 patient's coulter was discontinued and 7/9 patient was able to be extubated. On 7/11 speech and swallow recommended a miced and moist diet. On 7/13 patient was able to step down from SICU to a telemetry floor. On 7/17, patient became hypoxic needing to be place on BiPAP to maintain adequate oxygenation. He was able to be progressed down to nasal canal then room air with adequate oxygenation. CT Chest Angio, Abd and Pelvis showed multiple old PE seen since June scans, multiple infarcts in both kidneys, possible ischemic cecum, and left upper lobe infiltrate. Patient was given laxis and started on an Heparin drip. He had a bloody BM the next day, was taken for an IVC filter and taken off heparin IV. Patient was transferred to SICU for further monitoring. Patient was restarted on SQH, ASA, and PO home meds (norvasc and metoprolol) for HTN. Minced and moist diet recommended per speech and swallow, patient has since been tolerating with careful calorie count monitoring. Patient was stepped down to a telemtry floor. 7/23 WBC elevation to 14, CXR confirmed scattered lung infiltrates and bilateral pleural effusions similar to previous CXRs. Patient has been tolerating diet with no complications or concerns while waiting for placement in inpatient rehab facility.   LAST UPDATED ON 7/26**** 53 y/o Male, current every day marijuana use, w/ PMHx of HTN, type A aortic dissection s/p Dacron grafts, AV resuspension in 2013, CAD s/p CABG x 1 SVG to RCA (5/2013 with Dr. Medina), seizure disorder (last episode on 7/4/22) who was admitted for surgical mgmt of progression of aneurysmal disease. Recent admission 3/20-4/14 during which patient underwent replacement of transverse aortic arch, second stage thoracic endovascular aortic repair, aorto-axillary bypass, AV replacement (bio 23mm), and CABG x 1 (SVG-RCA) EF 75% (Ignacio, 3/20). Post op c/b lactic acidosis, severe cardiogenic and vasogenic shock, TREY requiring CVVHD and temporary dialysis requirement. Patient presented to [facility] ED 4/27 for syncope vs seizure episode at home, falling onto back of head. Neurological workup performed during that visit revealed a negative EEG, but given clinical picture of seizures, pt started on vimpat and depakote. Imaging preformed during that admission did no require acute surgical intervention on endoleak.   Pt presented to [place] ED on 5/4 complaining of worsening epigastric pain. CTAP revealed continued endoleak. Sent to [facility] for further evaluation. On 5/5, pt taken to the OR, and underwent TEVAR revision. On 5/13, pt underwent Celiac, SMA, splenic and left gastric angiogram which revealed no pseudoaneurysm or any active bleeding. Post op course c/b Klebsiella bacteremia (5/15), resp failure requiring intubation on 5/18, Mucus plugging s/p Bronch 5/19 and PEA arrest. Post operatively pt also found to have hemorrhagic pancreatitis with venu-pancreatic abscess possibly 2* to Depakote therefore s/p IR aspiration of peripancreatic fluid collection and paracentesis on 5/22, IR venu-pancreatic abscess drainage and placement of drainage catheter on 5/24. Pt then transferred from CTICU to SICU for septic shock, and further mgmt of hemorrhagic pancreatitis on 5/25. s/p IR placement of 2 new drainage catheters and catheter exchange on 5/26. LUQ drainage 5/30. OR 5/31 for exlap washout & replacement of 3 new JPs and abthera vac with open abdomen. RTOR 6/1, no bleeding, evac of old blood, placement of feeding J-tube. failed extubation s/p trach 6/5 with pneumonia. Repeat bronchoscopy on 6/9 with lavage. On 6/11, Aquaphoresis stopped per renal. bronch cx kleb and enterobacter, pt was on Meropenem and CTX. 6/13, TF reached goal. On 6/14, switched to trach collar. 6/16, CT abd/pl done without new collections. On 6/17, Caspo started for Yeast on SputCx. On 6/24, pt stepdown to telemetry. Rectal tube d/c'd.  On 6/24, 1 IR drain removed. On 6/26, 2 additional IR drains removed and Left RP OR drain removed. On 6/27, S/S to reeval and pulm c/s to decannulate. S/S recommended soft diet with thin liquids. Tube feed held and diet was given. Pulmonology decannulated him on 6/28. abd CODIE drain removed. Pt vomited twice. On 6/30 hgb noted 6.5- 1 unit given. CT ab/pl done on suggestive of free air likely 2/2 to tube removal, multiple pancr collections slightly increased, moderate b/l pleural effusions, periportal edema, gastric wall thickening, stable aortic dissection. On 7/1, pt was septic and saturating 87% on RA. CT c/a/p suggestive of NEW large area of groundglass density involving the right upper and middle lobe suspicious for acute infectious inflammatory process. Pt transferred to SICU and was intubated for respiratory distress. On 7/4, Sputum growing pseudomonas, antiobiotics adjusted from meropenem and vancomycin to zosyn. On 7/8 patient's coulter was discontinued and 7/9 patient was able to be extubated. On 7/11 speech and swallow recommended a minced and moist diet. On 7/13 patient was able to step down from SICU to a telemetry floor. On 7/17, patient became hypoxic needing to be place on BiPAP to maintain adequate oxygenation. He was able to be progressed down to nasal canal then room air with adequate oxygenation. CT Chest Angio, Abd and Pelvis showed multiple old PE seen since June scans, multiple infarcts in both kidneys, possible ischemic cecum, and left upper lobe infiltrate. Patient was given lasix and started on an Heparin drip. He had a bloody BM the next day, was taken for an IVC filter and taken off heparin IV. Patient was transferred to SICU for further monitoring. Patient was restarted on SQH, ASA, and PO home meds (norvasc and metoprolol) for HTN. Minced and moist diet recommended per speech and swallow, patient has since been tolerating with careful calorie count monitoring. Patient was stepped down to a telemtry floor. 7/23 WBC elevation to 14, CXR confirmed scattered lung infiltrates and bilateral pleural effusions similar to previous CXRs. On 7/27 patient kept comfortable with good pain control and encourage OOB/IS. 7/28-7/31 unremarkable, tolerating diet, pending COURT placement.   LAST UPDATED 8/1*** 51 y/o Male, current every day marijuana use, w/ PMHx of HTN, type A aortic dissection s/p Dacron grafts, AV resuspension in 2013, CAD s/p CABG x 1 SVG to RCA (5/2013 with Dr. Medina), seizure disorder (last episode on 7/4/22) who was admitted for surgical mgmt of progression of aneurysmal disease. Recent admission 3/20-4/14 during which patient underwent replacement of transverse aortic arch, second stage thoracic endovascular aortic repair, aorto-axillary bypass, AV replacement (bio 23mm), and CABG x 1 (SVG-RCA) EF 75% (Ignacio, 3/20). Post op c/b lactic acidosis, severe cardiogenic and vasogenic shock, TREY requiring CVVHD and temporary dialysis requirement. Patient presented to [facility] ED 4/27 for syncope vs seizure episode at home, falling onto back of head. Neurological workup performed during that visit revealed a negative EEG, but given clinical picture of seizures, pt started on vimpat and depakote. Imaging preformed during that admission did no require acute surgical intervention on endoleak.   Pt presented to [place] ED on 5/4 complaining of worsening epigastric pain. CTAP revealed continued endoleak. Sent to [facility] for further evaluation. On 5/5, pt taken to the OR, and underwent TEVAR revision. On 5/13, pt underwent Celiac, SMA, splenic and left gastric angiogram which revealed no pseudoaneurysm or any active bleeding. Post op course c/b Klebsiella bacteremia (5/15), resp failure requiring intubation on 5/18, Mucus plugging s/p Bronch 5/19 and PEA arrest. Post operatively pt also found to have hemorrhagic pancreatitis with venu-pancreatic abscess possibly 2* to Depakote therefore s/p IR aspiration of peripancreatic fluid collection and paracentesis on 5/22, IR venu-pancreatic abscess drainage and placement of drainage catheter on 5/24. Pt then transferred from CTICU to SICU for septic shock, and further mgmt of hemorrhagic pancreatitis on 5/25. s/p IR placement of 2 new drainage catheters and catheter exchange on 5/26. LUQ drainage 5/30. OR 5/31 for exlap washout & replacement of 3 new JPs and abthera vac with open abdomen. RTOR 6/1, no bleeding, evac of old blood, placement of feeding J-tube. failed extubation s/p trach 6/5 with pneumonia. Repeat bronchoscopy on 6/9 with lavage. On 6/11, Aquaphoresis stopped per renal. bronch cx kleb and enterobacter, pt was on Meropenem and CTX. 6/13, TF reached goal. On 6/14, switched to trach collar. 6/16, CT abd/pl done without new collections. On 6/17, Caspo started for Yeast on SputCx. On 6/24, pt stepdown to telemetry. Rectal tube d/c'd.  On 6/24, 1 IR drain removed. On 6/26, 2 additional IR drains removed and Left RP OR drain removed. On 6/27, S/S to reeval and pulm c/s to decannulate. S/S recommended soft diet with thin liquids. Tube feed held and diet was given. Pulmonology decannulated him on 6/28. abd CODIE drain removed. Pt vomited twice. On 6/30 hgb noted 6.5- 1 unit given. CT ab/pl done on suggestive of free air likely 2/2 to tube removal, multiple pancr collections slightly increased, moderate b/l pleural effusions, periportal edema, gastric wall thickening, stable aortic dissection. On 7/1, pt was septic and saturating 87% on RA. CT c/a/p suggestive of NEW large area of groundglass density involving the right upper and middle lobe suspicious for acute infectious inflammatory process. Pt transferred to SICU and was intubated for respiratory distress. On 7/4, Sputum growing pseudomonas, antiobiotics adjusted from meropenem and vancomycin to zosyn. On 7/8 patient's coulter was discontinued and 7/9 patient was able to be extubated. On 7/11 speech and swallow recommended a minced and moist diet. On 7/13 patient was able to step down from SICU to a telemetry floor. On 7/17, patient became hypoxic needing to be place on BiPAP to maintain adequate oxygenation. He was able to be progressed down to nasal canal then room air with adequate oxygenation. CT Chest Angio, Abd and Pelvis showed multiple old PE seen since June scans, multiple infarcts in both kidneys, possible ischemic cecum, and left upper lobe infiltrate. Patient was given lasix and started on an Heparin drip. He had a bloody BM the next day, was taken for an IVC filter and taken off heparin IV. Patient was transferred to SICU for further monitoring. Patient was restarted on SQH, ASA, and PO home meds (norvasc and metoprolol) for HTN. Minced and moist diet recommended per speech and swallow, patient has since been tolerating with careful calorie count monitoring. Patient was stepped down to a telemtry floor. 7/23 WBC elevation to 14, CXR confirmed scattered lung infiltrates and bilateral pleural effusions similar to previous CXRs. On 7/27 patient kept comfortable with good pain control and encourage OOB/IS. 7/28-7/31 unremarkable, tolerating diet.    53 y/o Male, current every day marijuana use, w/ PMHx of HTN, type A aortic dissection s/p Dacron grafts, AV resuspension in 2013, CAD s/p CABG x 1 SVG to RCA (5/2013 with Dr. Medina), seizure disorder (last episode on 7/4/22) who was admitted for surgical mgmt of progression of aneurysmal disease. Recent admission 3/20-4/14 during which patient underwent replacement of transverse aortic arch, second stage thoracic endovascular aortic repair, aorto-axillary bypass, AV replacement (bio 23mm), and CABG x 1 (SVG-RCA) EF 75% (Ignacio, 3/20). Post op c/b lactic acidosis, severe cardiogenic and vasogenic shock, TREY requiring CVVHD and temporary dialysis requirement. Patient presented to [facility] ED 4/27 for syncope vs seizure episode at home, falling onto back of head. Neurological workup performed during that visit revealed a negative EEG, but given clinical picture of seizures, pt started on vimpat and depakote. Imaging preformed during that admission did no require acute surgical intervention on endoleak.   Pt presented to [place] ED on 5/4 complaining of worsening epigastric pain. CTAP revealed continued endoleak. Sent to [facility] for further evaluation. On 5/5, pt taken to the OR, and underwent TEVAR revision. On 5/13, pt underwent Celiac, SMA, splenic and left gastric angiogram which revealed no pseudoaneurysm or any active bleeding. Post op course c/b Klebsiella bacteremia (5/15), resp failure requiring intubation on 5/18, Mucus plugging s/p Bronch 5/19 and PEA arrest. Post operatively pt also found to have hemorrhagic pancreatitis with venu-pancreatic abscess possibly 2* to Depakote therefore s/p IR aspiration of peripancreatic fluid collection and paracentesis on 5/22, IR venu-pancreatic abscess drainage and placement of drainage catheter on 5/24. Pt then transferred from CTICU to SICU for septic shock, and further mgmt of hemorrhagic pancreatitis on 5/25. s/p IR placement of 2 new drainage catheters and catheter exchange on 5/26. LUQ drainage 5/30. OR 5/31 for exlap washout & replacement of 3 new JPs and abthera vac with open abdomen. RTOR 6/1, no bleeding, evac of old blood, placement of feeding J-tube. failed extubation s/p trach 6/5 with pneumonia. Repeat bronchoscopy on 6/9 with lavage. On 6/11, Aquaphoresis stopped per renal. bronch cx kleb and enterobacter, pt was on Meropenem and CTX. 6/13, TF reached goal. On 6/14, switched to trach collar. 6/16, CT abd/pl done without new collections. On 6/17, Caspo started for Yeast on SputCx. On 6/24, pt stepdown to telemetry. Rectal tube d/c'd.  On 6/24, 1 IR drain removed. On 6/26, 2 additional IR drains removed and Left RP OR drain removed. On 6/27, S/S to reeval and pulm c/s to decannulate. S/S recommended soft diet with thin liquids. Tube feed held and diet was given. Pulmonology decannulated him on 6/28. abd CODIE drain removed. Pt vomited twice. On 6/30 hgb noted 6.5- 1 unit given. CT ab/pl done on suggestive of free air likely 2/2 to tube removal, multiple pancr collections slightly increased, moderate b/l pleural effusions, periportal edema, gastric wall thickening, stable aortic dissection. On 7/1, pt was septic and saturating 87% on RA. CT c/a/p suggestive of NEW large area of groundglass density involving the right upper and middle lobe suspicious for acute infectious inflammatory process. Pt transferred to SICU and was intubated for respiratory distress. On 7/4, Sputum growing pseudomonas, antiobiotics adjusted from meropenem and vancomycin to zosyn. On 7/8 patient's coulter was discontinued and 7/9 patient was able to be extubated. On 7/11 speech and swallow recommended a minced and moist diet. On 7/13 patient was able to step down from SICU to a telemetry floor. On 7/17, patient became hypoxic needing to be place on BiPAP to maintain adequate oxygenation. He was able to be progressed down to nasal canal then room air with adequate oxygenation. CT Chest Angio, Abd and Pelvis showed multiple old PE seen since June scans, multiple infarcts in both kidneys, possible ischemic cecum, and left upper lobe infiltrate. Patient was given lasix and started on an Heparin drip. He had a bloody BM the next day, was taken for an IVC filter and taken off heparin IV. Patient was transferred to SICU for further monitoring. Patient was restarted on SQH, ASA, and PO home meds (norvasc and metoprolol) for HTN. Minced and moist diet recommended per speech and swallow, patient has since been tolerating with careful calorie count monitoring. Patient was stepped down to a telemtry floor. 7/23 WBC elevation to 14, CXR confirmed scattered lung infiltrates and bilateral pleural effusions similar to previous CXRs. On 7/27 patient kept comfortable with good pain control and encourage OOB/IS. 7/28-8/2 unremarkable, tolerating diet, voiding and BM    51 y/o Male, current every day marijuana use, w/ PMHx of HTN, type A aortic dissection s/p Dacron grafts, AV resuspension in 2013, CAD s/p CABG x 1 SVG to RCA (5/2013 with Dr. Medina), seizure disorder (last episode on 7/4/22) who was admitted for surgical mgmt of progression of aneurysmal disease. Recent admission 3/20-4/14 during which patient underwent replacement of transverse aortic arch, second stage thoracic endovascular aortic repair, aorto-axillary bypass, AV replacement (bio 23mm), and CABG x 1 (SVG-RCA) EF 75% (Ignacio, 3/20). Post op c/b lactic acidosis, severe cardiogenic and vasogenic shock, TREY requiring CVVHD and temporary dialysis requirement. Patient presented to [facility] ED 4/27 for syncope vs seizure episode at home, falling onto back of head. Neurological workup performed during that visit revealed a negative EEG, but given clinical picture of seizures, pt started on vimpat and depakote. Imaging preformed during that admission did no require acute surgical intervention on endoleak.   Pt presented to [place] ED on 5/4 complaining of worsening epigastric pain. CTAP revealed continued endoleak. Sent to [facility] for further evaluation. On 5/5, pt taken to the OR, and underwent TEVAR revision. On 5/13, pt underwent Celiac, SMA, splenic and left gastric angiogram which revealed no pseudoaneurysm or any active bleeding. Post op course c/b Klebsiella bacteremia (5/15), resp failure requiring intubation on 5/18, Mucus plugging s/p Bronch 5/19 and PEA arrest. Post operatively pt also found to have hemorrhagic pancreatitis with venu-pancreatic abscess possibly 2* to Depakote therefore s/p IR aspiration of peripancreatic fluid collection and paracentesis on 5/22, IR venu-pancreatic abscess drainage and placement of drainage catheter on 5/24. Pt then transferred from CTICU to SICU for septic shock, and further mgmt of hemorrhagic pancreatitis on 5/25. s/p IR placement of 2 new drainage catheters and catheter exchange on 5/26. LUQ drainage 5/30. OR 5/31 for exlap washout & replacement of 3 new JPs and abthera vac with open abdomen. RTOR 6/1, no bleeding, evac of old blood, placement of feeding J-tube. failed extubation s/p trach 6/5 with pneumonia. Repeat bronchoscopy on 6/9 with lavage. On 6/11, Aquaphoresis stopped per renal. bronch cx kleb and enterobacter, pt was on Meropenem and CTX. 6/13, TF reached goal. On 6/14, switched to trach collar. 6/16, CT abd/pl done without new collections. On 6/17, Caspo started for Yeast on SputCx. On 6/24, pt stepdown to telemetry. Rectal tube d/c'd.  On 6/24, 1 IR drain removed. On 6/26, 2 additional IR drains removed and Left RP OR drain removed. On 6/27, S/S to reeval and pulm c/s to decannulate. S/S recommended soft diet with thin liquids. Tube feed held and diet was given. Pulmonology decannulated him on 6/28. abd CODIE drain removed. Pt vomited twice. On 6/30 hgb noted 6.5- 1 unit given. CT ab/pl done on suggestive of free air likely 2/2 to tube removal, multiple pancr collections slightly increased, moderate b/l pleural effusions, periportal edema, gastric wall thickening, stable aortic dissection. On 7/1, pt was septic and saturating 87% on RA. CT c/a/p suggestive of NEW large area of groundglass density involving the right upper and middle lobe suspicious for acute infectious inflammatory process. Pt transferred to SICU and was intubated for respiratory distress. On 7/4, Sputum growing pseudomonas, antiobiotics adjusted from meropenem and vancomycin to zosyn. On 7/8 patient's coulter was discontinued and 7/9 patient was able to be extubated. On 7/11 speech and swallow recommended a minced and moist diet. On 7/13 patient was able to step down from SICU to a telemetry floor. On 7/17, patient became hypoxic needing to be place on BiPAP to maintain adequate oxygenation. He was able to be progressed down to nasal canal then room air with adequate oxygenation. CT Chest Angio, Abd and Pelvis showed multiple old PE seen since June scans, multiple infarcts in both kidneys, possible ischemic cecum, and left upper lobe infiltrate. Patient was given lasix and started on an Heparin drip. He had a bloody BM the next day, was taken for an IVC filter and taken off heparin IV. Patient was transferred to SICU for further monitoring. Patient was restarted on SQH, ASA, and PO home meds (norvasc and metoprolol) for HTN. Minced and moist diet recommended per speech and swallow, patient has since been tolerating with careful calorie count monitoring. Patient was stepped down to a telemtry floor. 7/23 WBC elevation to 14, CXR confirmed scattered lung infiltrates and bilateral pleural effusions similar to previous CXRs. On 7/27 patient kept comfortable with good pain control and encourage OOB/IS. 7/28-8/2 unremarkable, tolerating diet, voiding and BM without issue.

## 2023-06-27 NOTE — DISCHARGE NOTE PROVIDER - NSDCFUADDINST_GEN_ALL_CORE_FT
Warning Signs:  Please call your doctor or nurse practitioner if you experience the following:  *You experience new chest pain, pressure, squeezing or tightness.  *New or worsening cough, shortness of breath, or wheeze.  *If you are vomiting and cannot keep down fluids or your medications.  *You are getting dehydrated due to continued vomiting, diarrhea, or other reasons. Signs of dehydration include dry mouth, rapid heartbeat, or feeling dizzy or faint when standing.  *You see blood or dark/black material when you vomit or have a bowel movement.  *You experience burning when you urinate, have blood in your urine, or experience a discharge.  *Your pain is not improving within 8-12 hours or is not gone within 24 hours. Call or return immediately if your pain is getting worse, changes location, or moves to your chest or back.  *You have shaking chills, or fever greater than 101.5 degrees Fahrenheit or 38 degrees Celsius.  *Any change in your symptoms, or any new symptoms that concern you.  General Discharge Instructions:  Please resume all regular home medications unless specifically advised not to take a particular medication. Also, please take any new medications as prescribed.  Please get plenty of rest, continue to ambulate several times per day, and drink adequate amounts of fluids.   Please follow-up with your surgeon and Primary Care Provider (PCP) as advised.    Please keep the J-Tube site clean and dry.       Warning Signs:  Please call your doctor or nurse practitioner if you experience the following:  *You experience new chest pain, pressure, squeezing or tightness.  *New or worsening cough, shortness of breath, or wheeze.  *If you are vomiting and cannot keep down fluids or your medications.  *You are getting dehydrated due to continued vomiting, diarrhea, or other reasons. Signs of dehydration include dry mouth, rapid heartbeat, or feeling dizzy or faint when standing.  *You see blood or dark/black material when you vomit or have a bowel movement.  *You experience burning when you urinate, have blood in your urine, or experience a discharge.  *Your pain is not improving within 8-12 hours or is not gone within 24 hours. Call or return immediately if your pain is getting worse, changes location, or moves to your chest or back.  *You have shaking chills, or fever greater than 101.5 degrees Fahrenheit or 38 degrees Celsius.  *Any change in your symptoms, or any new symptoms that concern you.

## 2023-06-27 NOTE — PROGRESS NOTE ADULT - ATTENDING COMMENTS
Complex course as per above s/p trach. Has been doing well on the Kelli valve. Cap overnight. Plan to decannulate in AM.

## 2023-06-27 NOTE — DISCHARGE NOTE PROVIDER - CARE PROVIDER_API CALL
Paris Solano  Surgery  [address] [address]  Phone: [phone]  Fax: [phone]  Follow Up Time: 2 weeks    Vilma Ellison  Vascular Surgery  [address]  [place] [address]  Phone: [phone]  Fax: [phone]  Follow Up Time:    Paris Solano  Surgery  [address] [address]  Phone: [phone]  Fax: [phone]  Follow Up Time: 2 weeks    Vilma Ellison  Vascular Surgery  [address], [address]  [place] [address]  Phone: [phone]  Fax: [phone]  Follow Up Time:     Godfrey Cardoso  Urology  [address] [facility], [address]  [place] [address]  Phone: [phone]  Fax: [phone]  Follow Up Time:

## 2023-06-27 NOTE — DISCHARGE NOTE PROVIDER - NSDCHHASSISTDEVIC_GEN_ALL_CORE_FT
Abdomen soft, non-tender, no guarding.
Requires 2 person assist to walk, will benefit from assist device like walker or rolator

## 2023-06-27 NOTE — DISCHARGE NOTE PROVIDER - CARE PROVIDERS DIRECT ADDRESSES
,DirectAddress_Unknown,[email or web address] ,DirectAddress_Unknown,[email or web address],DirectAddress_Unknown

## 2023-06-27 NOTE — PROGRESS NOTE ADULT - SUBJECTIVE AND OBJECTIVE BOX
PULMONARY CONSULT SERVICE FOLLOW-UP NOTE    INTERVAL HPI:  Reviewed chart and overnight events; patient seen and examined at bedside. NAD, with wife at bedside. Pulmonary called for possible decanulation     MEDICATIONS:  Pulmonary:  albuterol/ipratropium for Nebulization 3 milliLiter(s) Nebulizer every 6 hours  sodium chloride 3%  Inhalation 4 milliLiter(s) Inhalation every 6 hours    Antimicrobials:    Anticoagulants:  aspirin  chewable 81 milliGRAM(s) Oral daily  heparin   Injectable 5000 Unit(s) SubCutaneous every 8 hours    Cardiac:  metoprolol tartrate Injectable 2.5 milliGRAM(s) IV Push every 6 hours      Allergies    No Known Allergies    Intolerances        Vital Signs Last 24 Hrs  T(C): 37.2 (27 Jun 2023 18:00), Max: 37.7 (27 Jun 2023 04:51)  T(F): 99 (27 Jun 2023 18:00), Max: 99.8 (27 Jun 2023 04:51)  HR: 102 (27 Jun 2023 13:36) (82 - 112)  BP: 129/82 (27 Jun 2023 13:36) (114/75 - 151/75)  BP(mean): 101 (27 Jun 2023 13:36) (84 - 105)  RR: 17 (27 Jun 2023 13:36) (17 - 18)  SpO2: 98% (27 Jun 2023 16:04) (98% - 100%)    Parameters below as of 27 Jun 2023 16:04  Patient On (Oxygen Delivery Method): tracheostomy collar, trach collar capped as per order        06-26 @ 07:01 - 06-27 @ 07:00  --------------------------------------------------------  IN: 3510 mL / OUT: 1065 mL / NET: 2445 mL    06-27 @ 07:01 - 06-27 @ 18:10  --------------------------------------------------------  IN: 1050 mL / OUT: 200 mL / NET: 850 mL          PHYSICAL EXAM:  Constitutional: well-appearing  HEENT: NC/AT; PERRL, anicteric sclera; MMM  Neck: supple; trach site c/d/i  Cardiovascular: +S1/S2, RRR  Respiratory: CTA B/L; no W/R/R  Gastrointestinal: soft, NT/ND, multiple surgical wounds   Extremities: WWP; no edema, clubbing or cyanosis  Vascular: 2+ radial pulses B/L  Neurological: awake and alert; GODINEZ    LABS:      CBC Full  -  ( 27 Jun 2023 05:30 )  WBC Count : 12.12 K/uL  RBC Count : 2.41 M/uL  Hemoglobin : 7.2 g/dL  Hematocrit : 23.2 %  Platelet Count - Automated : 549 K/uL  Mean Cell Volume : 96.3 fl  Mean Cell Hemoglobin : 29.9 pg  Mean Cell Hemoglobin Concentration : 31.0 gm/dL  Auto Neutrophil # : x  Auto Lymphocyte # : x  Auto Monocyte # : x  Auto Eosinophil # : x  Auto Basophil # : x  Auto Neutrophil % : x  Auto Lymphocyte % : x  Auto Monocyte % : x  Auto Eosinophil % : x  Auto Basophil % : x    06-27    140  |  106  |  15  ----------------------------<  91  3.6   |  25  |  0.55    Ca    7.9<L>      27 Jun 2023 05:30  Phos  3.9     06-27  Mg     2.0     06-27      PT/INR - ( 26 Jun 2023 06:58 )   PT: 14.5 sec;   INR: 1.22          PTT - ( 26 Jun 2023 06:58 )  PTT:38.5 sec      Urinalysis Basic - ( 27 Jun 2023 05:30 )    Color: x / Appearance: x / SG: x / pH: x  Gluc: 91 mg/dL / Ketone: x  / Bili: x / Urobili: x   Blood: x / Protein: x / Nitrite: x   Leuk Esterase: x / RBC: x / WBC x   Sq Epi: x / Non Sq Epi: x / Bacteria: x                RADIOLOGY & ADDITIONAL STUDIES: PULMONARY CONSULT SERVICE FOLLOW-UP NOTE    INTERVAL HPI:  Reviewed chart and overnight events; patient seen and examined at bedside. NAD, with wife at bedside. Pulmonary called for possible decannulation CHart reviewed - has been on trach collar since 6/14, and had passy sindi valve evaluation by SLP on 6/22. Otherwise has been tolerating     MEDICATIONS:  Pulmonary:  albuterol/ipratropium for Nebulization 3 milliLiter(s) Nebulizer every 6 hours  sodium chloride 3%  Inhalation 4 milliLiter(s) Inhalation every 6 hours    Antimicrobials:    Anticoagulants:  aspirin  chewable 81 milliGRAM(s) Oral daily  heparin   Injectable 5000 Unit(s) SubCutaneous every 8 hours    Cardiac:  metoprolol tartrate Injectable 2.5 milliGRAM(s) IV Push every 6 hours      Allergies    No Known Allergies    Intolerances        Vital Signs Last 24 Hrs  T(C): 37.2 (27 Jun 2023 18:00), Max: 37.7 (27 Jun 2023 04:51)  T(F): 99 (27 Jun 2023 18:00), Max: 99.8 (27 Jun 2023 04:51)  HR: 102 (27 Jun 2023 13:36) (82 - 112)  BP: 129/82 (27 Jun 2023 13:36) (114/75 - 151/75)  BP(mean): 101 (27 Jun 2023 13:36) (84 - 105)  RR: 17 (27 Jun 2023 13:36) (17 - 18)  SpO2: 98% (27 Jun 2023 16:04) (98% - 100%)    Parameters below as of 27 Jun 2023 16:04  Patient On (Oxygen Delivery Method): tracheostomy collar, trach collar capped as per order        06-26 @ 07:01 - 06-27 @ 07:00  --------------------------------------------------------  IN: 3510 mL / OUT: 1065 mL / NET: 2445 mL    06-27 @ 07:01 - 06-27 @ 18:10  --------------------------------------------------------  IN: 1050 mL / OUT: 200 mL / NET: 850 mL          PHYSICAL EXAM:  Constitutional: well-appearing  HEENT: NC/AT; PERRL, anicteric sclera; MMM  Neck: supple; trach site c/d/i  Cardiovascular: +S1/S2, RRR  Respiratory: CTA B/L; no W/R/R  Gastrointestinal: soft, NT/ND, multiple surgical wounds   Extremities: WWP; no edema, clubbing or cyanosis  Vascular: 2+ radial pulses B/L  Neurological: awake and alert; GODINEZ    LABS:      CBC Full  -  ( 27 Jun 2023 05:30 )  WBC Count : 12.12 K/uL  RBC Count : 2.41 M/uL  Hemoglobin : 7.2 g/dL  Hematocrit : 23.2 %  Platelet Count - Automated : 549 K/uL  Mean Cell Volume : 96.3 fl  Mean Cell Hemoglobin : 29.9 pg  Mean Cell Hemoglobin Concentration : 31.0 gm/dL  Auto Neutrophil # : x  Auto Lymphocyte # : x  Auto Monocyte # : x  Auto Eosinophil # : x  Auto Basophil # : x  Auto Neutrophil % : x  Auto Lymphocyte % : x  Auto Monocyte % : x  Auto Eosinophil % : x  Auto Basophil % : x    06-27    140  |  106  |  15  ----------------------------<  91  3.6   |  25  |  0.55    Ca    7.9<L>      27 Jun 2023 05:30  Phos  3.9     06-27  Mg     2.0     06-27      PT/INR - ( 26 Jun 2023 06:58 )   PT: 14.5 sec;   INR: 1.22          PTT - ( 26 Jun 2023 06:58 )  PTT:38.5 sec      Urinalysis Basic - ( 27 Jun 2023 05:30 )    Color: x / Appearance: x / SG: x / pH: x  Gluc: 91 mg/dL / Ketone: x  / Bili: x / Urobili: x   Blood: x / Protein: x / Nitrite: x   Leuk Esterase: x / RBC: x / WBC x   Sq Epi: x / Non Sq Epi: x / Bacteria: x                RADIOLOGY & ADDITIONAL STUDIES: PULMONARY CONSULT SERVICE FOLLOW-UP NOTE    INTERVAL HPI:  Reviewed chart and overnight events; patient seen and examined at bedside. NAD, with wife at bedside. Pulmonary called for possible decannulation CHart reviewed - has been on trach collar since 6/14, and had passy sindi valve evaluation by SLP on 6/22. Otherwise has been tolerating with trach cuff deflated. No fevers or chills. Surgical drains DCd.      MEDICATIONS:  Pulmonary:  albuterol/ipratropium for Nebulization 3 milliLiter(s) Nebulizer every 6 hours  sodium chloride 3%  Inhalation 4 milliLiter(s) Inhalation every 6 hours    Antimicrobials:    Anticoagulants:  aspirin  chewable 81 milliGRAM(s) Oral daily  heparin   Injectable 5000 Unit(s) SubCutaneous every 8 hours    Cardiac:  metoprolol tartrate Injectable 2.5 milliGRAM(s) IV Push every 6 hours      Allergies    No Known Allergies    Intolerances        Vital Signs Last 24 Hrs  T(C): 37.2 (27 Jun 2023 18:00), Max: 37.7 (27 Jun 2023 04:51)  T(F): 99 (27 Jun 2023 18:00), Max: 99.8 (27 Jun 2023 04:51)  HR: 102 (27 Jun 2023 13:36) (82 - 112)  BP: 129/82 (27 Jun 2023 13:36) (114/75 - 151/75)  BP(mean): 101 (27 Jun 2023 13:36) (84 - 105)  RR: 17 (27 Jun 2023 13:36) (17 - 18)  SpO2: 98% (27 Jun 2023 16:04) (98% - 100%)    Parameters below as of 27 Jun 2023 16:04  Patient On (Oxygen Delivery Method): tracheostomy collar, trach collar capped as per order        06-26 @ 07:01 - 06-27 @ 07:00  --------------------------------------------------------  IN: 3510 mL / OUT: 1065 mL / NET: 2445 mL    06-27 @ 07:01 - 06-27 @ 18:10  --------------------------------------------------------  IN: 1050 mL / OUT: 200 mL / NET: 850 mL          PHYSICAL EXAM:  Constitutional: well-appearing  HEENT: NC/AT; PERRL, anicteric sclera; MMM  Neck: supple; trach site c/d/i  Cardiovascular: +S1/S2, RRR  Respiratory: CTA B/L; no W/R/R  Gastrointestinal: soft, NT/ND, multiple surgical wounds   Extremities: WWP; no edema, clubbing or cyanosis  Vascular: 2+ radial pulses B/L  Neurological: awake and alert; GODINEZ    LABS:      CBC Full  -  ( 27 Jun 2023 05:30 )  WBC Count : 12.12 K/uL  RBC Count : 2.41 M/uL  Hemoglobin : 7.2 g/dL  Hematocrit : 23.2 %  Platelet Count - Automated : 549 K/uL  Mean Cell Volume : 96.3 fl  Mean Cell Hemoglobin : 29.9 pg  Mean Cell Hemoglobin Concentration : 31.0 gm/dL  Auto Neutrophil # : x  Auto Lymphocyte # : x  Auto Monocyte # : x  Auto Eosinophil # : x  Auto Basophil # : x  Auto Neutrophil % : x  Auto Lymphocyte % : x  Auto Monocyte % : x  Auto Eosinophil % : x  Auto Basophil % : x    06-27    140  |  106  |  15  ----------------------------<  91  3.6   |  25  |  0.55    Ca    7.9<L>      27 Jun 2023 05:30  Phos  3.9     06-27  Mg     2.0     06-27      PT/INR - ( 26 Jun 2023 06:58 )   PT: 14.5 sec;   INR: 1.22          PTT - ( 26 Jun 2023 06:58 )  PTT:38.5 sec      Urinalysis Basic - ( 27 Jun 2023 05:30 )    Color: x / Appearance: x / SG: x / pH: x  Gluc: 91 mg/dL / Ketone: x  / Bili: x / Urobili: x   Blood: x / Protein: x / Nitrite: x   Leuk Esterase: x / RBC: x / WBC x   Sq Epi: x / Non Sq Epi: x / Bacteria: x                RADIOLOGY & ADDITIONAL STUDIES:

## 2023-06-27 NOTE — DISCHARGE NOTE PROVIDER - NSDCFUADDAPPT_GEN_ALL_CORE_FT
Please follow up with your primary care provider as soon as able after discharge.  Please follow up with your primary care provider as soon as able after discharge.     Follow up with your home neurologist within 1 week of discharge date.    Follow up with Dr. Ellison (Vascular) in 1-2 weeks since day of discharge call the number provided to make an appointment for possible IVC filter removal.     You may follow up with Dr. Cardoso (urologist) if any urinary symptoms arise.

## 2023-06-27 NOTE — SWALLOW FEES ASSESSMENT ADULT - SLP PERTINENT HISTORY OF CURRENT PROBLEM
h/o type A aortic dissection s/p Dacron grafts (2013), CABG x1, seizure disorder, recent admission for replacement of transverse aortic arch, TEVAR, AV replacement, and CABGx1 (3/2023); c/b endo leak - transferred to [facility], TEVAR revision (5/5/23), c/b bacteremia, respiratory failure, mucus plugging, intubation, PEA arrest , hemorrhagic pancreatitis, trach, J tube

## 2023-06-27 NOTE — PROGRESS NOTE ADULT - ASSESSMENT
53M h/o seizure d/o, aortic dissection s/p AVR, aortic graft revision 3/20/23, 2nd stage TEVAR 5/5/23, course c/b peripancreatic/RP hemorrhage/hematoma formation s/p multiple washout and recurrent respiratory failure with most recent reintubation on 6/3 due to excessive secretions and difficulty protecting airway. Pulmonary consulted for percutaneous tracheostomy.     #Recurrent Respiratory Failure   #Critical Illness Neuropathy/Myopathy  #Peripheral eosinophilia     S/p percutaneous tracheostomy placement done at bedside 6/6. Site appears clean, no evidence of gross bleeding. He is tolerating pressure support trials and analgesia has been adquate. Of note, he has an increasing peripheral eosinophilia, which suspect is likely due to a large number of causative drugs which he has been receiving. He had undergone a prior bronchoscopy with BAL in end of May prior to the eruption of his peripheral eosinophilia - diagnostic studies not reported.       Pulmonary called back 6/27 for possible trach decanulation. Given he has tolerated trach collar as well as passy sindi valve, and also passed FEES with ability to tolerate secretions, would be a good candidate for decanulation. Recommend capping trial x24 hours and if tolerating will aim to deccanulate tomorrow.     Recommend:   - capping trial x24 hours with trach cuff deflated   - if tolerating, will plan to deccanulate tomorrow   - if any issues overnight, would remove cap and place back on trach collar and will reassess in AM     S/E/D with Dr. Cai  53M h/o seizure d/o, aortic dissection s/p AVR, aortic graft revision 3/20/23, 2nd stage TEVAR 5/5/23, course c/b peripancreatic/RP hemorrhage/hematoma formation s/p multiple washout and recurrent respiratory failure with most recent reintubation on 6/3 due to excessive secretions and difficulty protecting airway. Pulmonary consulted for percutaneous tracheostomy.     #Recurrent Respiratory Failure   #Critical Illness Neuropathy/Myopathy  #Peripheral eosinophilia     S/p percutaneous tracheostomy placement done at bedside 6/6. Site appears clean, no evidence of gross bleeding. He is tolerating pressure support trials and analgesia has been adequate. Of note, he has an increasing peripheral eosinophilia, which suspect is likely due to a large number of causative drugs which he has been receiving. He had undergone a prior bronchoscopy with BAL in end of May prior to the eruption of his peripheral eosinophilia - diagnostic studies not reported.       Pulmonary called back 6/27 for possible trach decanulation. Given he has tolerated trach collar as well as passy sindi valve, and also passed FEES with ability to tolerate secretions, would be a good candidate for decanulation. Recommend capping trial x24 hours and if tolerating will aim to deccanulate tomorrow.     Recommend:   - capping trial x24 hours with trach cuff deflated   - if tolerating, will plan to deccanulate tomorrow   - if any issues overnight, would remove cap and place back on trach collar and will reassess in AM     S/E/D with Dr. Cai

## 2023-06-28 LAB
ANION GAP SERPL CALC-SCNC: 8 MMOL/L — SIGNIFICANT CHANGE UP (ref 5–17)
BUN SERPL-MCNC: 14 MG/DL — SIGNIFICANT CHANGE UP (ref 7–23)
CALCIUM SERPL-MCNC: 8.1 MG/DL — LOW (ref 8.4–10.5)
CHLORIDE SERPL-SCNC: 107 MMOL/L — SIGNIFICANT CHANGE UP (ref 96–108)
CO2 SERPL-SCNC: 24 MMOL/L — SIGNIFICANT CHANGE UP (ref 22–31)
CREAT SERPL-MCNC: 0.51 MG/DL — SIGNIFICANT CHANGE UP (ref 0.5–1.3)
EGFR: 120 ML/MIN/1.73M2 — SIGNIFICANT CHANGE UP
GLUCOSE BLDC GLUCOMTR-MCNC: 100 MG/DL — HIGH (ref 70–99)
GLUCOSE BLDC GLUCOMTR-MCNC: 101 MG/DL — HIGH (ref 70–99)
GLUCOSE BLDC GLUCOMTR-MCNC: 106 MG/DL — HIGH (ref 70–99)
GLUCOSE BLDC GLUCOMTR-MCNC: 93 MG/DL — SIGNIFICANT CHANGE UP (ref 70–99)
GLUCOSE BLDC GLUCOMTR-MCNC: 94 MG/DL — SIGNIFICANT CHANGE UP (ref 70–99)
GLUCOSE BLDC GLUCOMTR-MCNC: 96 MG/DL — SIGNIFICANT CHANGE UP (ref 70–99)
GLUCOSE BLDC GLUCOMTR-MCNC: 98 MG/DL — SIGNIFICANT CHANGE UP (ref 70–99)
GLUCOSE SERPL-MCNC: 86 MG/DL — SIGNIFICANT CHANGE UP (ref 70–99)
HCT VFR BLD CALC: 23 % — LOW (ref 39–50)
HGB BLD-MCNC: 7.1 G/DL — LOW (ref 13–17)
LACOSAMIDE (VIMPAT) RESULT: 29.97 UG/ML — HIGH (ref 1–10)
MAGNESIUM SERPL-MCNC: 2 MG/DL — SIGNIFICANT CHANGE UP (ref 1.6–2.6)
MCHC RBC-ENTMCNC: 29.6 PG — SIGNIFICANT CHANGE UP (ref 27–34)
MCHC RBC-ENTMCNC: 30.9 GM/DL — LOW (ref 32–36)
MCV RBC AUTO: 95.8 FL — SIGNIFICANT CHANGE UP (ref 80–100)
NRBC # BLD: 0 /100 WBCS — SIGNIFICANT CHANGE UP (ref 0–0)
PHOSPHATE SERPL-MCNC: 3.8 MG/DL — SIGNIFICANT CHANGE UP (ref 2.5–4.5)
PLATELET # BLD AUTO: 538 K/UL — HIGH (ref 150–400)
POTASSIUM SERPL-MCNC: 4.1 MMOL/L — SIGNIFICANT CHANGE UP (ref 3.5–5.3)
POTASSIUM SERPL-SCNC: 4.1 MMOL/L — SIGNIFICANT CHANGE UP (ref 3.5–5.3)
RBC # BLD: 2.4 M/UL — LOW (ref 4.2–5.8)
RBC # FLD: 16.6 % — HIGH (ref 10.3–14.5)
SODIUM SERPL-SCNC: 139 MMOL/L — SIGNIFICANT CHANGE UP (ref 135–145)
WBC # BLD: 12.16 K/UL — HIGH (ref 3.8–10.5)
WBC # FLD AUTO: 12.16 K/UL — HIGH (ref 3.8–10.5)

## 2023-06-28 PROCEDURE — 99233 SBSQ HOSP IP/OBS HIGH 50: CPT | Mod: GC

## 2023-06-28 RX ORDER — ONDANSETRON 8 MG/1
4 TABLET, FILM COATED ORAL EVERY 8 HOURS
Refills: 0 | Status: DISCONTINUED | OUTPATIENT
Start: 2023-06-28 | End: 2023-07-19

## 2023-06-28 RX ORDER — SCOPALAMINE 1 MG/3D
1 PATCH, EXTENDED RELEASE TRANSDERMAL ONCE
Refills: 0 | Status: COMPLETED | OUTPATIENT
Start: 2023-06-28 | End: 2023-06-28

## 2023-06-28 RX ORDER — METOPROLOL TARTRATE 50 MG
100 TABLET ORAL DAILY
Refills: 0 | Status: DISCONTINUED | OUTPATIENT
Start: 2023-06-29 | End: 2023-07-03

## 2023-06-28 RX ORDER — ONDANSETRON 8 MG/1
4 TABLET, FILM COATED ORAL ONCE
Refills: 0 | Status: COMPLETED | OUTPATIENT
Start: 2023-06-28 | End: 2023-06-28

## 2023-06-28 RX ORDER — SODIUM CHLORIDE 9 MG/ML
250 INJECTION, SOLUTION INTRAVENOUS ONCE
Refills: 0 | Status: COMPLETED | OUTPATIENT
Start: 2023-06-28 | End: 2023-06-28

## 2023-06-28 RX ADMIN — OXYCODONE HYDROCHLORIDE 10 MILLIGRAM(S): 5 TABLET ORAL at 19:25

## 2023-06-28 RX ADMIN — SODIUM CHLORIDE 4 MILLILITER(S): 9 INJECTION INTRAMUSCULAR; INTRAVENOUS; SUBCUTANEOUS at 19:26

## 2023-06-28 RX ADMIN — Medication 3 MILLILITER(S): at 14:05

## 2023-06-28 RX ADMIN — Medication 2 MILLIGRAM(S): at 07:07

## 2023-06-28 RX ADMIN — OXYCODONE HYDROCHLORIDE 10 MILLIGRAM(S): 5 TABLET ORAL at 19:30

## 2023-06-28 RX ADMIN — Medication 1 DROP(S): at 07:23

## 2023-06-28 RX ADMIN — SODIUM CHLORIDE 4 MILLILITER(S): 9 INJECTION INTRAMUSCULAR; INTRAVENOUS; SUBCUTANEOUS at 07:04

## 2023-06-28 RX ADMIN — SODIUM CHLORIDE 4 MILLILITER(S): 9 INJECTION INTRAMUSCULAR; INTRAVENOUS; SUBCUTANEOUS at 14:05

## 2023-06-28 RX ADMIN — LACOSAMIDE 200 MILLIGRAM(S): 50 TABLET ORAL at 19:25

## 2023-06-28 RX ADMIN — Medication 2 MILLIGRAM(S): at 14:05

## 2023-06-28 RX ADMIN — HEPARIN SODIUM 5000 UNIT(S): 5000 INJECTION INTRAVENOUS; SUBCUTANEOUS at 22:39

## 2023-06-28 RX ADMIN — HEPARIN SODIUM 5000 UNIT(S): 5000 INJECTION INTRAVENOUS; SUBCUTANEOUS at 14:04

## 2023-06-28 RX ADMIN — Medication 975 MILLIGRAM(S): at 23:32

## 2023-06-28 RX ADMIN — Medication 3 MILLILITER(S): at 07:03

## 2023-06-28 RX ADMIN — Medication 15 MILLILITER(S): at 14:27

## 2023-06-28 RX ADMIN — ONDANSETRON 4 MILLIGRAM(S): 8 TABLET, FILM COATED ORAL at 22:12

## 2023-06-28 RX ADMIN — Medication 975 MILLIGRAM(S): at 19:30

## 2023-06-28 RX ADMIN — Medication 975 MILLIGRAM(S): at 07:04

## 2023-06-28 RX ADMIN — Medication 975 MILLIGRAM(S): at 19:25

## 2023-06-28 RX ADMIN — Medication 975 MILLIGRAM(S): at 14:03

## 2023-06-28 RX ADMIN — CHLORHEXIDINE GLUCONATE 15 MILLILITER(S): 213 SOLUTION TOPICAL at 19:25

## 2023-06-28 RX ADMIN — Medication 975 MILLIGRAM(S): at 00:00

## 2023-06-28 RX ADMIN — OXYCODONE HYDROCHLORIDE 10 MILLIGRAM(S): 5 TABLET ORAL at 07:05

## 2023-06-28 RX ADMIN — Medication 1 DROP(S): at 19:25

## 2023-06-28 RX ADMIN — SODIUM CHLORIDE 4 MILLILITER(S): 9 INJECTION INTRAMUSCULAR; INTRAVENOUS; SUBCUTANEOUS at 23:31

## 2023-06-28 RX ADMIN — ONDANSETRON 4 MILLIGRAM(S): 8 TABLET, FILM COATED ORAL at 19:25

## 2023-06-28 RX ADMIN — CHLORHEXIDINE GLUCONATE 15 MILLILITER(S): 213 SOLUTION TOPICAL at 07:05

## 2023-06-28 RX ADMIN — Medication 3 MILLILITER(S): at 19:26

## 2023-06-28 RX ADMIN — Medication 975 MILLIGRAM(S): at 07:23

## 2023-06-28 RX ADMIN — HEPARIN SODIUM 5000 UNIT(S): 5000 INJECTION INTRAVENOUS; SUBCUTANEOUS at 07:05

## 2023-06-28 RX ADMIN — Medication 2 MILLIGRAM(S): at 22:33

## 2023-06-28 RX ADMIN — Medication 975 MILLIGRAM(S): at 15:03

## 2023-06-28 RX ADMIN — SODIUM CHLORIDE 500 MILLILITER(S): 9 INJECTION, SOLUTION INTRAVENOUS at 04:29

## 2023-06-28 RX ADMIN — Medication 2.5 MILLIGRAM(S): at 14:04

## 2023-06-28 RX ADMIN — LACOSAMIDE 200 MILLIGRAM(S): 50 TABLET ORAL at 07:02

## 2023-06-28 RX ADMIN — OXYCODONE HYDROCHLORIDE 10 MILLIGRAM(S): 5 TABLET ORAL at 23:32

## 2023-06-28 RX ADMIN — Medication 81 MILLIGRAM(S): at 14:06

## 2023-06-28 RX ADMIN — OXYCODONE HYDROCHLORIDE 10 MILLIGRAM(S): 5 TABLET ORAL at 14:21

## 2023-06-28 RX ADMIN — OXYCODONE HYDROCHLORIDE 10 MILLIGRAM(S): 5 TABLET ORAL at 07:23

## 2023-06-28 RX ADMIN — PANTOPRAZOLE SODIUM 40 MILLIGRAM(S): 20 TABLET, DELAYED RELEASE ORAL at 14:04

## 2023-06-28 RX ADMIN — SCOPALAMINE 1 PATCH: 1 PATCH, EXTENDED RELEASE TRANSDERMAL at 22:12

## 2023-06-28 RX ADMIN — Medication 2.5 MILLIGRAM(S): at 07:05

## 2023-06-28 RX ADMIN — Medication 3 MILLILITER(S): at 23:31

## 2023-06-28 RX ADMIN — OXYCODONE HYDROCHLORIDE 10 MILLIGRAM(S): 5 TABLET ORAL at 14:06

## 2023-06-28 NOTE — PROGRESS NOTE ADULT - SUBJECTIVE AND OBJECTIVE BOX
PULMONARY CONSULT SERVICE FOLLOW-UP NOTE    INTERVAL HPI:  Reviewed chart and overnight events; patient seen and examined at bedside. Tolerated capping trial over night. Plans to decannulate today. Vital signs stable. ROS otherwise negativel    MEDICATIONS:  Pulmonary:  albuterol/ipratropium for Nebulization 3 milliLiter(s) Nebulizer every 6 hours  sodium chloride 3%  Inhalation 4 milliLiter(s) Inhalation every 6 hours    Antimicrobials:    Anticoagulants:  aspirin  chewable 81 milliGRAM(s) Oral daily  heparin   Injectable 5000 Unit(s) SubCutaneous every 8 hours    Cardiac:      Allergies    No Known Allergies    Intolerances        Vital Signs Last 24 Hrs  T(C): 36.9 (28 Jun 2023 18:05), Max: 37.4 (28 Jun 2023 05:00)  T(F): 98.4 (28 Jun 2023 18:05), Max: 99.3 (28 Jun 2023 05:00)  HR: 96 (28 Jun 2023 14:17) (91 - 102)  BP: 141/76 (28 Jun 2023 14:17) (94/44 - 154/83)  BP(mean): 102 (28 Jun 2023 14:17) (64 - 113)  RR: 17 (28 Jun 2023 14:17) (17 - 18)  SpO2: 95% (28 Jun 2023 14:17) (94% - 100%)    Parameters below as of 28 Jun 2023 14:17  Patient On (Oxygen Delivery Method): room air        06-27 @ 07:01 - 06-28 @ 07:00  --------------------------------------------------------  IN: 2900 mL / OUT: 1085 mL / NET: 1815 mL    06-28 @ 07:01 - 06-28 @ 17:55  --------------------------------------------------------  IN: 630 mL / OUT: 450 mL / NET: 180 mL          PHYSICAL EXAM:  Constitutional: chronically ill appearing  HEENT: NC/AT; PERRL, anicteric sclera; MMM  Neck: supple, trach c/d/i   Cardiovascular: +S1/S2, RRR  Respiratory: CTA B/L; no W/R/R  Gastrointestinal: soft, NT/ND; well healed surgical wounds   Extremities: WWP; no edema, clubbing or cyanosis  Vascular: 2+ radial pulses B/L  Neurological: awake and alert; GODINEZ    LABS:      CBC Full  -  ( 28 Jun 2023 05:30 )  WBC Count : 12.16 K/uL  RBC Count : 2.40 M/uL  Hemoglobin : 7.1 g/dL  Hematocrit : 23.0 %  Platelet Count - Automated : 538 K/uL  Mean Cell Volume : 95.8 fl  Mean Cell Hemoglobin : 29.6 pg  Mean Cell Hemoglobin Concentration : 30.9 gm/dL  Auto Neutrophil # : x  Auto Lymphocyte # : x  Auto Monocyte # : x  Auto Eosinophil # : x  Auto Basophil # : x  Auto Neutrophil % : x  Auto Lymphocyte % : x  Auto Monocyte % : x  Auto Eosinophil % : x  Auto Basophil % : x    06-28    139  |  107  |  14  ----------------------------<  86  4.1   |  24  |  0.51    Ca    8.1<L>      28 Jun 2023 05:30  Phos  3.8     06-28  Mg     2.0     06-28            Urinalysis Basic - ( 28 Jun 2023 05:30 )    Color: x / Appearance: x / SG: x / pH: x  Gluc: 86 mg/dL / Ketone: x  / Bili: x / Urobili: x   Blood: x / Protein: x / Nitrite: x   Leuk Esterase: x / RBC: x / WBC x   Sq Epi: x / Non Sq Epi: x / Bacteria: x                RADIOLOGY & ADDITIONAL STUDIES:

## 2023-06-28 NOTE — PROGRESS NOTE ADULT - ASSESSMENT
53M h/o seizure d/o, aortic dissection s/p AVR, aortic graft revision 3/20/23, 2nd stage TEVAR 5/5/23, course c/b peripancreatic/RP hemorrhage/hematoma formation s/p multiple washout and recurrent respiratory failure with most recent reintubation on 6/3 due to excessive secretions and difficulty protecting airway. Pulmonary consulted for percutaneous tracheostomy.     #Recurrent Respiratory Failure   #Critical Illness Neuropathy/Myopathy  #Peripheral eosinophilia     S/p percutaneous tracheostomy placement done at bedside 6/6. Site appears clean, no evidence of gross bleeding. He is tolerating pressure support trials and analgesia has been adequate. Of note, he has an increasing peripheral eosinophilia, which suspect is likely due to a large number of causative drugs which he has been receiving. He had undergone a prior bronchoscopy with BAL in end of May prior to the eruption of his peripheral eosinophilia - diagnostic studies not reported.       Pulmonary called back 6/27 for possible trach decanulation. Given he has tolerated trach collar as well as passy sindi valve, and also passed FEES with ability to tolerate secretions, would be a good candidate for decanulation. Tolerated subsequent capping trial, decanulation today.     Recommend:   - s/p decannulation today at bedside  - please keep size 8 shilley at bedside for any issues overnight or going forward   - will continue to follow     S/E/D with Dr. Cai

## 2023-06-28 NOTE — PROGRESS NOTE ADULT - SUBJECTIVE AND OBJECTIVE BOX
SUBJECTIVE: Pt seen and examined at bedside with chief. Pt complaints of 5 episodes of diarrhea overnight. Pain well controlled. Tolerating TF without N/V    MEDICATIONS  (STANDING):  acetaminophen   Oral Liquid .. 975 milliGRAM(s) Enteral Tube every 6 hours  albuterol/ipratropium for Nebulization 3 milliLiter(s) Nebulizer every 6 hours  artificial  tears Solution 1 Drop(s) Both EYES two times a day  aspirin  chewable 81 milliGRAM(s) Oral daily  chlorhexidine 0.12% Liquid 15 milliLiter(s) Oral Mucosa every 12 hours  dextrose 5% + sodium chloride 0.45%. 1000 milliLiter(s) (80 mL/Hr) IV Continuous <Continuous>  dextrose 5%. 1000 milliLiter(s) (100 mL/Hr) IV Continuous <Continuous>  dextrose 50% Injectable 25 Gram(s) IV Push once  glucagon  Injectable 1 milliGRAM(s) IntraMuscular once  heparin   Injectable 5000 Unit(s) SubCutaneous every 8 hours  insulin lispro (ADMELOG) corrective regimen sliding scale   SubCutaneous every 6 hours  lacosamide Solution 200 milliGRAM(s) Oral two times a day  loperamide Liquid 2 milliGRAM(s) Enteral Tube every 8 hours  metoprolol tartrate Injectable 2.5 milliGRAM(s) IV Push every 6 hours  multivitamin/minerals/iron Oral Solution (CENTRUM) 15 milliLiter(s) Oral daily  oxyCODONE    Solution 10 milliGRAM(s) Oral every 6 hours  pantoprazole  Injectable 40 milliGRAM(s) IV Push daily  sodium chloride 3%  Inhalation 4 milliLiter(s) Inhalation every 6 hours    MEDICATIONS  (PRN):  dextrose Oral Gel 15 Gram(s) Oral once PRN Blood Glucose LESS THAN 70 milliGRAM(s)/deciliter  HYDROmorphone  Injectable 1 milliGRAM(s) IV Push every 4 hours PRN breakthrough pain  sodium chloride 0.9% lock flush 10 milliLiter(s) IV Push every 1 hour PRN Pre/post blood products, medications, blood draw, and to maintain line patency      Vital Signs Last 24 Hrs  T(C): 37.4 (28 Jun 2023 05:00), Max: 37.4 (27 Jun 2023 13:55)  T(F): 99.3 (28 Jun 2023 05:00), Max: 99.4 (27 Jun 2023 13:55)  HR: 100 (28 Jun 2023 05:20) (92 - 112)  BP: 113/55 (28 Jun 2023 05:20) (94/44 - 154/83)  BP(mean): 77 (28 Jun 2023 05:20) (64 - 113)  RR: 18 (28 Jun 2023 05:20) (17 - 18)  SpO2: 96% (28 Jun 2023 05:20) (94% - 100%)    Parameters below as of 28 Jun 2023 05:20  Patient On (Oxygen Delivery Method): room air        PHYSICAL EXAM:      Constitutional: A&Ox3    Respiratory: trach capped. non labored breathing, no respiratory distress    Cardiovascular: NSR, RRR    Gastrointestinal: Soft ND, NT                 Incision: CDI. old CODIE site healing well. JPx1 with serous fluid.     Genitourinary: voiding     Extremities: +1 LE edema                  I&O's Detail    27 Jun 2023 07:01  -  28 Jun 2023 07:00  --------------------------------------------------------  IN:    dextrose 5% + sodium chloride 0.45%: 1500 mL    Vital1.0: 1400 mL  Total IN: 2900 mL    OUT:    Bulb (mL): 10 mL    Incontinent per Condom Catheter (mL): 650 mL    Voided (mL): 425 mL  Total OUT: 1085 mL    Total NET: 1815 mL          LABS:                        7.2    12.12 )-----------( 549      ( 27 Jun 2023 05:30 )             23.2     06-27    140  |  106  |  15  ----------------------------<  91  3.6   |  25  |  0.55    Ca    7.9<L>      27 Jun 2023 05:30  Phos  3.9     06-27  Mg     2.0     06-27        Urinalysis Basic - ( 27 Jun 2023 05:30 )    Color: x / Appearance: x / SG: x / pH: x  Gluc: 91 mg/dL / Ketone: x  / Bili: x / Urobili: x   Blood: x / Protein: x / Nitrite: x   Leuk Esterase: x / RBC: x / WBC x   Sq Epi: x / Non Sq Epi: x / Bacteria: x        RADIOLOGY & ADDITIONAL STUDIES:

## 2023-06-28 NOTE — PROGRESS NOTE ADULT - ASSESSMENT
53M w PMHx of HTN, type A aortic dissection s/p Dacron grafts, AV resuspension in 2013, CAD s/p CABG x 1 SVG to RCA (5/2013 with Dr. Medina), seizure disorder, recent admission 3/20-4/14 for replacement of transverse aortic arch, second stage TEVAR and aorto-axillary bypass, AV replacement (bio 23mm), and CABG x 1 (SVG-RCA). Pt found to have endo leak and taken to OR for TEVAR revision on 5/5. Post op course c/b Klebsiella bacteremia (5/15), resp failure requiring intubation on 5/18, Mucus plugging s/p Bronch 5/19 and PEA arrest. Post operatively pt also found to have hemorrhagic pancreatitis with venu-pancreatic abscess possibly 2* to Depakote therefore s/p IR aspiration of peripancreatic fluid collection and paracentesis on 5/22, IR venu-pancreatic abscess drainage and placement of drainage catheter on 5/24. Pt then transferred from CTICU to SICU for septic shock, and further mgmt of hemorrhagic pancreatitis on 5/25. s/p IR placement of 2 new drainage catheters and catheter exchange on 5/26. LUQ drainage 5/30. OR 5/31 for exlap washout & replacement of 3 new JPs and abthera vac with open abdomen. RTOR 6/1, no bleeding, evac of old blood, placement of feeding J-tube. failed extubation s/p trach 6/5 with pneumonia.    NPO w/ TF @goal  IVF  Pulm to decannulate  ASA/SQH  ISS  oxycodone 10mg Q6H standing  am labs

## 2023-06-29 LAB
ANION GAP SERPL CALC-SCNC: 7 MMOL/L — SIGNIFICANT CHANGE UP (ref 5–17)
BUN SERPL-MCNC: 11 MG/DL — SIGNIFICANT CHANGE UP (ref 7–23)
CALCIUM SERPL-MCNC: 8.3 MG/DL — LOW (ref 8.4–10.5)
CHLORIDE SERPL-SCNC: 109 MMOL/L — HIGH (ref 96–108)
CO2 SERPL-SCNC: 23 MMOL/L — SIGNIFICANT CHANGE UP (ref 22–31)
CREAT SERPL-MCNC: 0.6 MG/DL — SIGNIFICANT CHANGE UP (ref 0.5–1.3)
EGFR: 115 ML/MIN/1.73M2 — SIGNIFICANT CHANGE UP
GLUCOSE BLDC GLUCOMTR-MCNC: 108 MG/DL — HIGH (ref 70–99)
GLUCOSE BLDC GLUCOMTR-MCNC: 98 MG/DL — SIGNIFICANT CHANGE UP (ref 70–99)
GLUCOSE BLDC GLUCOMTR-MCNC: 99 MG/DL — SIGNIFICANT CHANGE UP (ref 70–99)
GLUCOSE SERPL-MCNC: 86 MG/DL — SIGNIFICANT CHANGE UP (ref 70–99)
HCT VFR BLD CALC: 23.6 % — LOW (ref 39–50)
HGB BLD-MCNC: 7.3 G/DL — LOW (ref 13–17)
MAGNESIUM SERPL-MCNC: 1.9 MG/DL — SIGNIFICANT CHANGE UP (ref 1.6–2.6)
MCHC RBC-ENTMCNC: 30 PG — SIGNIFICANT CHANGE UP (ref 27–34)
MCHC RBC-ENTMCNC: 30.9 GM/DL — LOW (ref 32–36)
MCV RBC AUTO: 97.1 FL — SIGNIFICANT CHANGE UP (ref 80–100)
NRBC # BLD: 0 /100 WBCS — SIGNIFICANT CHANGE UP (ref 0–0)
PHOSPHATE SERPL-MCNC: 4.1 MG/DL — SIGNIFICANT CHANGE UP (ref 2.5–4.5)
PLATELET # BLD AUTO: 542 K/UL — HIGH (ref 150–400)
POTASSIUM SERPL-MCNC: 4 MMOL/L — SIGNIFICANT CHANGE UP (ref 3.5–5.3)
POTASSIUM SERPL-SCNC: 4 MMOL/L — SIGNIFICANT CHANGE UP (ref 3.5–5.3)
RBC # BLD: 2.43 M/UL — LOW (ref 4.2–5.8)
RBC # FLD: 16.9 % — HIGH (ref 10.3–14.5)
SODIUM SERPL-SCNC: 139 MMOL/L — SIGNIFICANT CHANGE UP (ref 135–145)
WBC # BLD: 14.16 K/UL — HIGH (ref 3.8–10.5)
WBC # FLD AUTO: 14.16 K/UL — HIGH (ref 3.8–10.5)

## 2023-06-29 PROCEDURE — 99232 SBSQ HOSP IP/OBS MODERATE 35: CPT | Mod: GC

## 2023-06-29 RX ORDER — SODIUM CHLORIDE 9 MG/ML
500 INJECTION, SOLUTION INTRAVENOUS ONCE
Refills: 0 | Status: COMPLETED | OUTPATIENT
Start: 2023-06-29 | End: 2023-06-29

## 2023-06-29 RX ORDER — HYDROMORPHONE HYDROCHLORIDE 2 MG/ML
0.5 INJECTION INTRAMUSCULAR; INTRAVENOUS; SUBCUTANEOUS EVERY 4 HOURS
Refills: 0 | Status: DISCONTINUED | OUTPATIENT
Start: 2023-06-29 | End: 2023-07-06

## 2023-06-29 RX ADMIN — Medication 975 MILLIGRAM(S): at 11:59

## 2023-06-29 RX ADMIN — Medication 100 MILLIGRAM(S): at 06:16

## 2023-06-29 RX ADMIN — SODIUM CHLORIDE 4 MILLILITER(S): 9 INJECTION INTRAMUSCULAR; INTRAVENOUS; SUBCUTANEOUS at 06:15

## 2023-06-29 RX ADMIN — Medication 975 MILLIGRAM(S): at 17:42

## 2023-06-29 RX ADMIN — Medication 3 MILLILITER(S): at 17:43

## 2023-06-29 RX ADMIN — OXYCODONE HYDROCHLORIDE 10 MILLIGRAM(S): 5 TABLET ORAL at 06:16

## 2023-06-29 RX ADMIN — Medication 2 MILLIGRAM(S): at 14:26

## 2023-06-29 RX ADMIN — SODIUM CHLORIDE 4 MILLILITER(S): 9 INJECTION INTRAMUSCULAR; INTRAVENOUS; SUBCUTANEOUS at 23:59

## 2023-06-29 RX ADMIN — Medication 3 MILLILITER(S): at 23:59

## 2023-06-29 RX ADMIN — OXYCODONE HYDROCHLORIDE 10 MILLIGRAM(S): 5 TABLET ORAL at 22:45

## 2023-06-29 RX ADMIN — OXYCODONE HYDROCHLORIDE 10 MILLIGRAM(S): 5 TABLET ORAL at 07:18

## 2023-06-29 RX ADMIN — Medication 3 MILLILITER(S): at 06:15

## 2023-06-29 RX ADMIN — Medication 2 MILLIGRAM(S): at 22:15

## 2023-06-29 RX ADMIN — Medication 1 DROP(S): at 17:53

## 2023-06-29 RX ADMIN — ONDANSETRON 4 MILLIGRAM(S): 8 TABLET, FILM COATED ORAL at 14:26

## 2023-06-29 RX ADMIN — Medication 1 DROP(S): at 06:15

## 2023-06-29 RX ADMIN — SODIUM CHLORIDE 80 MILLILITER(S): 9 INJECTION, SOLUTION INTRAVENOUS at 07:14

## 2023-06-29 RX ADMIN — HEPARIN SODIUM 5000 UNIT(S): 5000 INJECTION INTRAVENOUS; SUBCUTANEOUS at 22:15

## 2023-06-29 RX ADMIN — SODIUM CHLORIDE 4 MILLILITER(S): 9 INJECTION INTRAMUSCULAR; INTRAVENOUS; SUBCUTANEOUS at 17:43

## 2023-06-29 RX ADMIN — OXYCODONE HYDROCHLORIDE 10 MILLIGRAM(S): 5 TABLET ORAL at 23:33

## 2023-06-29 RX ADMIN — Medication 81 MILLIGRAM(S): at 11:46

## 2023-06-29 RX ADMIN — PANTOPRAZOLE SODIUM 40 MILLIGRAM(S): 20 TABLET, DELAYED RELEASE ORAL at 11:45

## 2023-06-29 RX ADMIN — CHLORHEXIDINE GLUCONATE 15 MILLILITER(S): 213 SOLUTION TOPICAL at 17:42

## 2023-06-29 RX ADMIN — SCOPALAMINE 1 PATCH: 1 PATCH, EXTENDED RELEASE TRANSDERMAL at 07:17

## 2023-06-29 RX ADMIN — Medication 975 MILLIGRAM(S): at 11:45

## 2023-06-29 RX ADMIN — OXYCODONE HYDROCHLORIDE 10 MILLIGRAM(S): 5 TABLET ORAL at 18:20

## 2023-06-29 RX ADMIN — ONDANSETRON 4 MILLIGRAM(S): 8 TABLET, FILM COATED ORAL at 04:00

## 2023-06-29 RX ADMIN — Medication 975 MILLIGRAM(S): at 18:20

## 2023-06-29 RX ADMIN — OXYCODONE HYDROCHLORIDE 10 MILLIGRAM(S): 5 TABLET ORAL at 11:46

## 2023-06-29 RX ADMIN — LACOSAMIDE 200 MILLIGRAM(S): 50 TABLET ORAL at 07:11

## 2023-06-29 RX ADMIN — Medication 975 MILLIGRAM(S): at 00:34

## 2023-06-29 RX ADMIN — Medication 975 MILLIGRAM(S): at 23:33

## 2023-06-29 RX ADMIN — SCOPALAMINE 1 PATCH: 1 PATCH, EXTENDED RELEASE TRANSDERMAL at 19:23

## 2023-06-29 RX ADMIN — SODIUM CHLORIDE 500 MILLILITER(S): 9 INJECTION, SOLUTION INTRAVENOUS at 05:32

## 2023-06-29 RX ADMIN — OXYCODONE HYDROCHLORIDE 10 MILLIGRAM(S): 5 TABLET ORAL at 17:42

## 2023-06-29 RX ADMIN — LACOSAMIDE 200 MILLIGRAM(S): 50 TABLET ORAL at 17:42

## 2023-06-29 RX ADMIN — SODIUM CHLORIDE 4 MILLILITER(S): 9 INJECTION INTRAMUSCULAR; INTRAVENOUS; SUBCUTANEOUS at 11:46

## 2023-06-29 RX ADMIN — HEPARIN SODIUM 5000 UNIT(S): 5000 INJECTION INTRAVENOUS; SUBCUTANEOUS at 14:26

## 2023-06-29 RX ADMIN — Medication 3 MILLILITER(S): at 11:46

## 2023-06-29 RX ADMIN — Medication 15 MILLILITER(S): at 11:59

## 2023-06-29 RX ADMIN — OXYCODONE HYDROCHLORIDE 10 MILLIGRAM(S): 5 TABLET ORAL at 11:45

## 2023-06-29 RX ADMIN — Medication 975 MILLIGRAM(S): at 07:17

## 2023-06-29 RX ADMIN — OXYCODONE HYDROCHLORIDE 10 MILLIGRAM(S): 5 TABLET ORAL at 00:34

## 2023-06-29 RX ADMIN — HEPARIN SODIUM 5000 UNIT(S): 5000 INJECTION INTRAVENOUS; SUBCUTANEOUS at 06:16

## 2023-06-29 RX ADMIN — Medication 975 MILLIGRAM(S): at 06:14

## 2023-06-29 RX ADMIN — Medication 975 MILLIGRAM(S): at 22:44

## 2023-06-29 RX ADMIN — Medication 2 MILLIGRAM(S): at 07:13

## 2023-06-29 NOTE — PROGRESS NOTE ADULT - SUBJECTIVE AND OBJECTIVE BOX
PULMONARY CONSULT SERVICE FOLLOW-UP NOTE    INTERVAL HPI:  Reviewed chart and overnight events; patient seen and examined at bedside. S/p decannulation yesterday - tolerated well. Episodes of vomiting overnight, reports nausea. Remains on RA, ROS otherwise negative minus nausea.     MEDICATIONS:  Pulmonary:  albuterol/ipratropium for Nebulization 3 milliLiter(s) Nebulizer every 6 hours  sodium chloride 3%  Inhalation 4 milliLiter(s) Inhalation every 6 hours    Antimicrobials:    Anticoagulants:  aspirin  chewable 81 milliGRAM(s) Oral daily  heparin   Injectable 5000 Unit(s) SubCutaneous every 8 hours    Cardiac:  metoprolol succinate  milliGRAM(s) Oral daily      Allergies    No Known Allergies    Intolerances        Vital Signs Last 24 Hrs  T(C): 36.8 (29 Jun 2023 14:09), Max: 37.4 (28 Jun 2023 22:07)  T(F): 98.3 (29 Jun 2023 14:09), Max: 99.4 (28 Jun 2023 22:07)  HR: 90 (29 Jun 2023 16:25) (90 - 100)  BP: 138/80 (29 Jun 2023 16:25) (138/80 - 158/88)  BP(mean): 104 (29 Jun 2023 16:25) (104 - 116)  RR: 16 (29 Jun 2023 16:25) (16 - 18)  SpO2: 96% (29 Jun 2023 16:25) (94% - 100%)    Parameters below as of 29 Jun 2023 16:25  Patient On (Oxygen Delivery Method): room air        06-28 @ 07:01 - 06-29 @ 07:00  --------------------------------------------------------  IN: 1750 mL / OUT: 800 mL / NET: 950 mL    06-29 @ 07:01 - 06-29 @ 16:57  --------------------------------------------------------  IN: 800 mL / OUT: 375 mL / NET: 425 mL          PHYSICAL EXAM:  Constitutional: well-appearing  HEENT: NC/AT; PERRL, anicteric sclera; MMM  Neck: supple; stoma site clear   Cardiovascular: +S1/S2, RRR  Respiratory: CTA B/L; no W/R/R  Gastrointestinal: soft, NT/ND  Extremities: WWP; no edema, clubbing or cyanosis  Vascular: 2+ radial pulses B/L  Neurological: awake and alert; GODINEZ    LABS:      CBC Full  -  ( 29 Jun 2023 05:30 )  WBC Count : 14.16 K/uL  RBC Count : 2.43 M/uL  Hemoglobin : 7.3 g/dL  Hematocrit : 23.6 %  Platelet Count - Automated : 542 K/uL  Mean Cell Volume : 97.1 fl  Mean Cell Hemoglobin : 30.0 pg  Mean Cell Hemoglobin Concentration : 30.9 gm/dL  Auto Neutrophil # : x  Auto Lymphocyte # : x  Auto Monocyte # : x  Auto Eosinophil # : x  Auto Basophil # : x  Auto Neutrophil % : x  Auto Lymphocyte % : x  Auto Monocyte % : x  Auto Eosinophil % : x  Auto Basophil % : x    06-29    139  |  109<H>  |  11  ----------------------------<  86  4.0   |  23  |  0.60    Ca    8.3<L>      29 Jun 2023 05:30  Phos  4.1     06-29  Mg     1.9     06-29            Urinalysis Basic - ( 29 Jun 2023 05:30 )    Color: x / Appearance: x / SG: x / pH: x  Gluc: 86 mg/dL / Ketone: x  / Bili: x / Urobili: x   Blood: x / Protein: x / Nitrite: x   Leuk Esterase: x / RBC: x / WBC x   Sq Epi: x / Non Sq Epi: x / Bacteria: x                RADIOLOGY & ADDITIONAL STUDIES:

## 2023-06-29 NOTE — PROGRESS NOTE ADULT - SUBJECTIVE AND OBJECTIVE BOX
INTERVAL HPI/OVERNIGHT EVENTS: : +N/+V (x2, bilious emesis). ordered zofran and scopalamine patch. +F/+BM. abd soft, mild tenderness, nondistended. low UOP (0.3cc/kg/hr), 500cc bolus ordered.     STATUS POST:    5/5: second stage TEVAR  5/13: IR Celiac, SMA, splenic, and left gastric artery angiogram reveals no pseudoaneurysm or any active bleeding.  5/22:  IR Aspiration of left peripancreatic fluid collection  (15cc sanguinous) and paracentesis (4L clear yellow fluid)  5/24: IR L peripancreatic abscess collection drainage and placement of 12F drainage catheter  5/25: Rosalee upsized existing abscess drain to 16F, placed new drain to another abscess collection on left, and two drain for ascites.  5/26: Placement of two new drainage catheters and exchange of two   drainage catheters  5/30: IR LUQ drainage of 400cc purulent fluid  5/31: ex lap, wash out of ascites/old blood/scant new blood, all CODIE drain removal, CODIE x3 placement, abdomen open, UOP 0, EBL??  6/1: No bleeding, evac of old blood, placement of feeding J-tube, abd closure  6/5: Bedside trach (Pulm)  6/9: Bronchoscopy, lavage    SUBJECTIVE: Pt seen and examined at bedside this am by surgery team. Appears sleepy, reporting nausea and abdominal pain. Denies f/v/cp/sob.    MEDICATIONS  (STANDING):  acetaminophen   Oral Liquid .. 975 milliGRAM(s) Enteral Tube every 6 hours  albuterol/ipratropium for Nebulization 3 milliLiter(s) Nebulizer every 6 hours  artificial  tears Solution 1 Drop(s) Both EYES two times a day  aspirin  chewable 81 milliGRAM(s) Oral daily  chlorhexidine 0.12% Liquid 15 milliLiter(s) Oral Mucosa every 12 hours  dextrose 5% + sodium chloride 0.45%. 1000 milliLiter(s) (80 mL/Hr) IV Continuous <Continuous>  dextrose 5%. 1000 milliLiter(s) (100 mL/Hr) IV Continuous <Continuous>  dextrose 50% Injectable 25 Gram(s) IV Push once  glucagon  Injectable 1 milliGRAM(s) IntraMuscular once  heparin   Injectable 5000 Unit(s) SubCutaneous every 8 hours  insulin lispro (ADMELOG) corrective regimen sliding scale   SubCutaneous every 6 hours  lacosamide Solution 200 milliGRAM(s) Oral two times a day  loperamide Liquid 2 milliGRAM(s) Enteral Tube every 8 hours  metoprolol succinate  milliGRAM(s) Oral daily  multivitamin/minerals/iron Oral Solution (CENTRUM) 15 milliLiter(s) Oral daily  oxyCODONE    Solution 10 milliGRAM(s) Oral every 6 hours  pantoprazole  Injectable 40 milliGRAM(s) IV Push daily  sodium chloride 3%  Inhalation 4 milliLiter(s) Inhalation every 6 hours    MEDICATIONS  (PRN):  dextrose Oral Gel 15 Gram(s) Oral once PRN Blood Glucose LESS THAN 70 milliGRAM(s)/deciliter  HYDROmorphone  Injectable 1 milliGRAM(s) IV Push every 4 hours PRN breakthrough pain  ondansetron Injectable 4 milliGRAM(s) IV Push every 8 hours PRN Nausea and/or Vomiting  sodium chloride 0.9% lock flush 10 milliLiter(s) IV Push every 1 hour PRN Pre/post blood products, medications, blood draw, and to maintain line patency      Vital Signs Last 24 Hrs  T(C): 36.8 (29 Jun 2023 14:09), Max: 37.4 (28 Jun 2023 22:07)  T(F): 98.3 (29 Jun 2023 14:09), Max: 99.4 (28 Jun 2023 22:07)  HR: 94 (29 Jun 2023 11:45) (94 - 100)  BP: 147/81 (29 Jun 2023 11:45) (141/76 - 158/88)  BP(mean): 108 (29 Jun 2023 11:45) (102 - 116)  RR: 17 (29 Jun 2023 11:45) (16 - 18)  SpO2: 96% (29 Jun 2023 11:45) (94% - 100%)    Parameters below as of 29 Jun 2023 11:45  Patient On (Oxygen Delivery Method): room air    PHYSICAL EXAM:    Constitutional: A&Ox3, NAD    Respiratory: non labored breathing, no respiratory distress    Cardiovascular: NSR, RRR    Gastrointestinal: abdomen soft, mildly distended, diffusely ttp, no rebound or guarding.                  Incision: CDI. old CODIE site healing well    Extremities: 1+ edema, RLE >LLE, non ttp. no calf tenderness    I&O's Detail    28 Jun 2023 07:01  -  29 Jun 2023 07:00  --------------------------------------------------------  IN:    dextrose 5% + sodium chloride 0.45%: 1680 mL    Vital1.0: 70 mL  Total IN: 1750 mL    OUT:    Incontinent per Condom Catheter (mL): 800 mL  Total OUT: 800 mL    Total NET: 950 mL      29 Jun 2023 07:01  -  29 Jun 2023 14:16  --------------------------------------------------------  IN:    dextrose 5% + sodium chloride 0.45%: 400 mL  Total IN: 400 mL    OUT:    Voided (mL): 275 mL  Total OUT: 275 mL    Total NET: 125 mL          LABS:                        7.3    14.16 )-----------( 542      ( 29 Jun 2023 05:30 )             23.6     06-29    139  |  109<H>  |  11  ----------------------------<  86  4.0   |  23  |  0.60    Ca    8.3<L>      29 Jun 2023 05:30  Phos  4.1     06-29  Mg     1.9     06-29        Urinalysis Basic - ( 29 Jun 2023 05:30 )    Color: x / Appearance: x / SG: x / pH: x  Gluc: 86 mg/dL / Ketone: x  / Bili: x / Urobili: x   Blood: x / Protein: x / Nitrite: x   Leuk Esterase: x / RBC: x / WBC x   Sq Epi: x / Non Sq Epi: x / Bacteria: x        RADIOLOGY & ADDITIONAL STUDIES:

## 2023-06-29 NOTE — PROGRESS NOTE ADULT - ASSESSMENT
53M h/o seizure d/o, aortic dissection s/p AVR, aortic graft revision 3/20/23, 2nd stage TEVAR 5/5/23, course c/b peripancreatic/RP hemorrhage/hematoma formation s/p multiple washout and recurrent respiratory failure with most recent reintubation on 6/3 due to excessive secretions and difficulty protecting airway. Pulmonary consulted for percutaneous tracheostomy.     #Recurrent Respiratory Failure   #Critical Illness Neuropathy/Myopathy  #Peripheral eosinophilia     S/p percutaneous tracheostomy placement done at bedside 6/6. Site appears clean, no evidence of gross bleeding. He is tolerating pressure support trials and analgesia has been adequate. Of note, he has an increasing peripheral eosinophilia, which suspect is likely due to a large number of causative drugs which he has been receiving. He had undergone a prior bronchoscopy with BAL in end of May prior to the eruption of his peripheral eosinophilia - diagnostic studies not reported.       Pulmonary called back 6/27 for possible trach decanulation. Given he has tolerated trach collar as well as passy sindi valve, and also passed FEES with ability to tolerate secretions, would be a good candidate for decanulation. Tolerated subsequent capping trial, decanulation 6/28    Recommend:   - s/p decannulation at bedside  - please keep size 8 shilley at bedside for any issues overnight or going forward   - will sign off at this time, please call back with issues.    S/E/D with Dr. Cai

## 2023-06-29 NOTE — PROGRESS NOTE ADULT - ASSESSMENT
53M w PMHx of HTN, type A aortic dissection s/p Dacron grafts, AV resuspension in 2013, CAD s/p CABG x 1 SVG to RCA (5/2013 with Dr. Medina), seizure disorder, recent admission 3/20-4/14 for replacement of transverse aortic arch, second stage TEVAR and aorto-axillary bypass, AV replacement (bio 23mm), and CABG x 1 (SVG-RCA). Pt found to have endo leak and taken to OR for TEVAR revision on 5/5. Post op course c/b Klebsiella bacteremia (5/15), resp failure requiring intubation on 5/18, Mucus plugging s/p Bronch 5/19 and PEA arrest. Post operatively pt also found to have hemorrhagic pancreatitis with venu-pancreatic abscess possibly 2* to Depakote therefore s/p IR aspiration of peripancreatic fluid collection and paracentesis on 5/22, IR venu-pancreatic abscess drainage and placement of drainage catheter on 5/24. Pt then transferred from CTICU to SICU for septic shock, and further mgmt of hemorrhagic pancreatitis on 5/25. s/p IR placement of 2 new drainage catheters and catheter exchange on 5/26. LUQ drainage 5/30. OR 5/31 for exlap washout & replacement of 3 new JPs and abthera vac with open abdomen. RTOR 6/1, no bleeding, evac of old blood, placement of feeding J-tube. failed extubation s/p trach 6/5 with pneumonia.    Soft/thin liquid diet, IVF  Trach decannulated 6/28  ASA/SQH  ISS  oxycodone 10mg Q6H standing  F/u Swallow study and UGIS to evaluate esophageal motility/aspiration, r/o gastric leak

## 2023-06-30 LAB
ALBUMIN SERPL ELPH-MCNC: 2.2 G/DL — LOW (ref 3.3–5)
ALP SERPL-CCNC: 96 U/L — SIGNIFICANT CHANGE UP (ref 40–120)
ALT FLD-CCNC: 9 U/L — LOW (ref 10–45)
ANION GAP SERPL CALC-SCNC: 7 MMOL/L — SIGNIFICANT CHANGE UP (ref 5–17)
AST SERPL-CCNC: 12 U/L — SIGNIFICANT CHANGE UP (ref 10–40)
BILIRUB SERPL-MCNC: 0.4 MG/DL — SIGNIFICANT CHANGE UP (ref 0.2–1.2)
BLD GP AB SCN SERPL QL: NEGATIVE — SIGNIFICANT CHANGE UP
BUN SERPL-MCNC: 10 MG/DL — SIGNIFICANT CHANGE UP (ref 7–23)
CALCIUM SERPL-MCNC: 8.2 MG/DL — LOW (ref 8.4–10.5)
CHLORIDE SERPL-SCNC: 107 MMOL/L — SIGNIFICANT CHANGE UP (ref 96–108)
CO2 SERPL-SCNC: 22 MMOL/L — SIGNIFICANT CHANGE UP (ref 22–31)
CREAT SERPL-MCNC: 0.58 MG/DL — SIGNIFICANT CHANGE UP (ref 0.5–1.3)
EGFR: 116 ML/MIN/1.73M2 — SIGNIFICANT CHANGE UP
GLUCOSE BLDC GLUCOMTR-MCNC: 106 MG/DL — HIGH (ref 70–99)
GLUCOSE BLDC GLUCOMTR-MCNC: 80 MG/DL — SIGNIFICANT CHANGE UP (ref 70–99)
GLUCOSE BLDC GLUCOMTR-MCNC: 95 MG/DL — SIGNIFICANT CHANGE UP (ref 70–99)
GLUCOSE BLDC GLUCOMTR-MCNC: 96 MG/DL — SIGNIFICANT CHANGE UP (ref 70–99)
GLUCOSE BLDC GLUCOMTR-MCNC: 96 MG/DL — SIGNIFICANT CHANGE UP (ref 70–99)
GLUCOSE SERPL-MCNC: 81 MG/DL — SIGNIFICANT CHANGE UP (ref 70–99)
HCT VFR BLD CALC: 20.8 % — CRITICAL LOW (ref 39–50)
HGB BLD-MCNC: 6.5 G/DL — CRITICAL LOW (ref 13–17)
MAGNESIUM SERPL-MCNC: 1.8 MG/DL — SIGNIFICANT CHANGE UP (ref 1.6–2.6)
MCHC RBC-ENTMCNC: 30.2 PG — SIGNIFICANT CHANGE UP (ref 27–34)
MCHC RBC-ENTMCNC: 31.3 GM/DL — LOW (ref 32–36)
MCV RBC AUTO: 96.7 FL — SIGNIFICANT CHANGE UP (ref 80–100)
NRBC # BLD: 0 /100 WBCS — SIGNIFICANT CHANGE UP (ref 0–0)
PHOSPHATE SERPL-MCNC: 3.8 MG/DL — SIGNIFICANT CHANGE UP (ref 2.5–4.5)
PLATELET # BLD AUTO: 508 K/UL — HIGH (ref 150–400)
POTASSIUM SERPL-MCNC: 3.7 MMOL/L — SIGNIFICANT CHANGE UP (ref 3.5–5.3)
POTASSIUM SERPL-SCNC: 3.7 MMOL/L — SIGNIFICANT CHANGE UP (ref 3.5–5.3)
PROT SERPL-MCNC: 5.6 G/DL — LOW (ref 6–8.3)
RBC # BLD: 2.15 M/UL — LOW (ref 4.2–5.8)
RBC # FLD: 16.8 % — HIGH (ref 10.3–14.5)
RH IG SCN BLD-IMP: POSITIVE — SIGNIFICANT CHANGE UP
SODIUM SERPL-SCNC: 136 MMOL/L — SIGNIFICANT CHANGE UP (ref 135–145)
WBC # BLD: 15.49 K/UL — HIGH (ref 3.8–10.5)
WBC # FLD AUTO: 15.49 K/UL — HIGH (ref 3.8–10.5)

## 2023-06-30 PROCEDURE — 74174 CTA ABD&PLVS W/CONTRAST: CPT | Mod: 26

## 2023-06-30 PROCEDURE — 74018 RADEX ABDOMEN 1 VIEW: CPT | Mod: 26

## 2023-06-30 RX ORDER — POTASSIUM CHLORIDE 20 MEQ
40 PACKET (EA) ORAL ONCE
Refills: 0 | Status: COMPLETED | OUTPATIENT
Start: 2023-06-30 | End: 2023-06-30

## 2023-06-30 RX ORDER — POTASSIUM CHLORIDE 20 MEQ
10 PACKET (EA) ORAL
Refills: 0 | Status: DISCONTINUED | OUTPATIENT
Start: 2023-06-30 | End: 2023-06-30

## 2023-06-30 RX ORDER — MAGNESIUM SULFATE 500 MG/ML
1 VIAL (ML) INJECTION ONCE
Refills: 0 | Status: COMPLETED | OUTPATIENT
Start: 2023-06-30 | End: 2023-06-30

## 2023-06-30 RX ORDER — SODIUM CHLORIDE 9 MG/ML
500 INJECTION, SOLUTION INTRAVENOUS ONCE
Refills: 0 | Status: COMPLETED | OUTPATIENT
Start: 2023-06-30 | End: 2023-06-30

## 2023-06-30 RX ADMIN — Medication 100 GRAM(S): at 09:57

## 2023-06-30 RX ADMIN — Medication 3 MILLILITER(S): at 06:38

## 2023-06-30 RX ADMIN — OXYCODONE HYDROCHLORIDE 10 MILLIGRAM(S): 5 TABLET ORAL at 17:30

## 2023-06-30 RX ADMIN — Medication 2 MILLIGRAM(S): at 22:25

## 2023-06-30 RX ADMIN — Medication 40 MILLIEQUIVALENT(S): at 13:26

## 2023-06-30 RX ADMIN — SODIUM CHLORIDE 4 MILLILITER(S): 9 INJECTION INTRAMUSCULAR; INTRAVENOUS; SUBCUTANEOUS at 17:06

## 2023-06-30 RX ADMIN — OXYCODONE HYDROCHLORIDE 10 MILLIGRAM(S): 5 TABLET ORAL at 06:38

## 2023-06-30 RX ADMIN — HEPARIN SODIUM 5000 UNIT(S): 5000 INJECTION INTRAVENOUS; SUBCUTANEOUS at 22:26

## 2023-06-30 RX ADMIN — Medication 1 DROP(S): at 17:06

## 2023-06-30 RX ADMIN — Medication 100 MILLIGRAM(S): at 06:38

## 2023-06-30 RX ADMIN — LACOSAMIDE 200 MILLIGRAM(S): 50 TABLET ORAL at 06:38

## 2023-06-30 RX ADMIN — SODIUM CHLORIDE 500 MILLILITER(S): 9 INJECTION, SOLUTION INTRAVENOUS at 00:28

## 2023-06-30 RX ADMIN — Medication 2 MILLIGRAM(S): at 13:55

## 2023-06-30 RX ADMIN — Medication 975 MILLIGRAM(S): at 17:06

## 2023-06-30 RX ADMIN — PANTOPRAZOLE SODIUM 40 MILLIGRAM(S): 20 TABLET, DELAYED RELEASE ORAL at 13:28

## 2023-06-30 RX ADMIN — SODIUM CHLORIDE 4 MILLILITER(S): 9 INJECTION INTRAMUSCULAR; INTRAVENOUS; SUBCUTANEOUS at 06:37

## 2023-06-30 RX ADMIN — OXYCODONE HYDROCHLORIDE 10 MILLIGRAM(S): 5 TABLET ORAL at 07:08

## 2023-06-30 RX ADMIN — OXYCODONE HYDROCHLORIDE 10 MILLIGRAM(S): 5 TABLET ORAL at 13:28

## 2023-06-30 RX ADMIN — Medication 3 MILLILITER(S): at 13:27

## 2023-06-30 RX ADMIN — OXYCODONE HYDROCHLORIDE 10 MILLIGRAM(S): 5 TABLET ORAL at 17:06

## 2023-06-30 RX ADMIN — SCOPALAMINE 1 PATCH: 1 PATCH, EXTENDED RELEASE TRANSDERMAL at 07:00

## 2023-06-30 RX ADMIN — SCOPALAMINE 1 PATCH: 1 PATCH, EXTENDED RELEASE TRANSDERMAL at 19:00

## 2023-06-30 RX ADMIN — Medication 15 MILLILITER(S): at 13:55

## 2023-06-30 RX ADMIN — CHLORHEXIDINE GLUCONATE 15 MILLILITER(S): 213 SOLUTION TOPICAL at 06:38

## 2023-06-30 RX ADMIN — Medication 975 MILLIGRAM(S): at 17:30

## 2023-06-30 RX ADMIN — Medication 975 MILLIGRAM(S): at 13:28

## 2023-06-30 RX ADMIN — SODIUM CHLORIDE 4 MILLILITER(S): 9 INJECTION INTRAMUSCULAR; INTRAVENOUS; SUBCUTANEOUS at 13:27

## 2023-06-30 RX ADMIN — Medication 975 MILLIGRAM(S): at 06:39

## 2023-06-30 RX ADMIN — Medication 81 MILLIGRAM(S): at 13:27

## 2023-06-30 RX ADMIN — HEPARIN SODIUM 5000 UNIT(S): 5000 INJECTION INTRAVENOUS; SUBCUTANEOUS at 13:26

## 2023-06-30 RX ADMIN — Medication 975 MILLIGRAM(S): at 13:45

## 2023-06-30 RX ADMIN — Medication 3 MILLILITER(S): at 17:07

## 2023-06-30 RX ADMIN — HEPARIN SODIUM 5000 UNIT(S): 5000 INJECTION INTRAVENOUS; SUBCUTANEOUS at 06:38

## 2023-06-30 RX ADMIN — Medication 1 DROP(S): at 06:40

## 2023-06-30 RX ADMIN — HYDROMORPHONE HYDROCHLORIDE 0.5 MILLIGRAM(S): 2 INJECTION INTRAMUSCULAR; INTRAVENOUS; SUBCUTANEOUS at 22:25

## 2023-06-30 RX ADMIN — Medication 2 MILLIGRAM(S): at 06:40

## 2023-06-30 RX ADMIN — LACOSAMIDE 200 MILLIGRAM(S): 50 TABLET ORAL at 17:05

## 2023-06-30 RX ADMIN — Medication 975 MILLIGRAM(S): at 07:09

## 2023-06-30 RX ADMIN — OXYCODONE HYDROCHLORIDE 10 MILLIGRAM(S): 5 TABLET ORAL at 13:50

## 2023-06-30 NOTE — PROGRESS NOTE ADULT - ASSESSMENT
53M w PMHx of HTN, type A aortic dissection s/p Dacron grafts, AV resuspension in 2013, CAD s/p CABG x 1 SVG to RCA (5/2013 with Dr. Medina), seizure disorder, recent admission 3/20-4/14 for replacement of transverse aortic arch, second stage TEVAR and aorto-axillary bypass, AV replacement (bio 23mm), and CABG x 1 (SVG-RCA). Pt found to have endo leak and taken to OR for TEVAR revision on 5/5. Post op course c/b Klebsiella bacteremia (5/15), resp failure requiring intubation on 5/18, Mucus plugging s/p Bronch 5/19 and PEA arrest. Post operatively pt also found to have hemorrhagic pancreatitis with venu-pancreatic abscess possibly 2* to Depakote therefore s/p IR aspiration of peripancreatic fluid collection and paracentesis on 5/22, IR venu-pancreatic abscess drainage and placement of drainage catheter on 5/24. Pt then transferred from CTICU to SICU for septic shock, and further mgmt of hemorrhagic pancreatitis on 5/25. s/p IR placement of 2 new drainage catheters and catheter exchange on 5/26. LUQ drainage 5/30. OR 5/31 for exlap washout & replacement of 3 new JPs and abthera vac with open abdomen. RTOR 6/1, no bleeding, evac of old blood, placement of feeding J-tube. failed extubation s/p trach 6/5 with pneumonia.    1U PRBC for acute anemia, hgb 6.5 (7.3)  CT angio  UGIS  NPO w/ tube feeds, IVF  Trach decannulated 6/28  ASA/SQH  ISS  oxycodone 10mg Q6H standing  Will remove staples today

## 2023-06-30 NOTE — PROGRESS NOTE ADULT - SUBJECTIVE AND OBJECTIVE BOX
INTERVAL HPI/OVERNIGHT EVENTS: nausea improving. +F/+BM. dilaudid changed to 0.5mg IV breakthrough. low UOP, given 500cc around midnight.     STATUS POST:    5/5: second stage TEVAR  5/13: IR Celiac, SMA, splenic, and left gastric artery angiogram reveals no pseudoaneurysm or any active bleeding.  5/22:  IR Aspiration of left peripancreatic fluid collection  (15cc sanguinous) and paracentesis (4L clear yellow fluid)  5/24: IR L peripancreatic abscess collection drainage and placement of 12F drainage catheter  5/25: Rosalee upsized existing abscess drain to 16F, placed new drain to another abscess collection on left, and two drain for ascites.  5/26: Placement of two new drainage catheters and exchange of two   drainage catheters  5/30: IR LUQ drainage of 400cc purulent fluid  5/31: ex lap, wash out of ascites/old blood/scant new blood, all CODIE drain removal, CODIE x3 placement, abdomen open, UOP 0, EBL??  6/1: No bleeding, evac of old blood, placement of feeding J-tube, abd closure  6/5: Bedside trach (Pulm)  6/9: Bronchoscopy, lavage    SUBJECTIVE: Pt seen and examined at bedside this am by surgery team. Tolerating diet, pain well controlled. Denies f/n/v/cp/sob.    MEDICATIONS  (STANDING):  acetaminophen   Oral Liquid .. 975 milliGRAM(s) Enteral Tube every 6 hours  albuterol/ipratropium for Nebulization 3 milliLiter(s) Nebulizer every 6 hours  artificial  tears Solution 1 Drop(s) Both EYES two times a day  aspirin  chewable 81 milliGRAM(s) Oral daily  dextrose 5% + sodium chloride 0.45%. 1000 milliLiter(s) (80 mL/Hr) IV Continuous <Continuous>  dextrose 5%. 1000 milliLiter(s) (100 mL/Hr) IV Continuous <Continuous>  dextrose 50% Injectable 25 Gram(s) IV Push once  glucagon  Injectable 1 milliGRAM(s) IntraMuscular once  heparin   Injectable 5000 Unit(s) SubCutaneous every 8 hours  insulin lispro (ADMELOG) corrective regimen sliding scale   SubCutaneous every 6 hours  lacosamide Solution 200 milliGRAM(s) Oral two times a day  loperamide Liquid 2 milliGRAM(s) Enteral Tube every 8 hours  metoprolol succinate  milliGRAM(s) Oral daily  multivitamin/minerals/iron Oral Solution (CENTRUM) 15 milliLiter(s) Oral daily  oxyCODONE    Solution 10 milliGRAM(s) Oral every 6 hours  pantoprazole  Injectable 40 milliGRAM(s) IV Push daily  potassium chloride   Powder 40 milliEquivalent(s) Oral once  sodium chloride 3%  Inhalation 4 milliLiter(s) Inhalation every 6 hours    MEDICATIONS  (PRN):  dextrose Oral Gel 15 Gram(s) Oral once PRN Blood Glucose LESS THAN 70 milliGRAM(s)/deciliter  HYDROmorphone  Injectable 0.5 milliGRAM(s) IV Push every 4 hours PRN breakthrough pain  ondansetron Injectable 4 milliGRAM(s) IV Push every 8 hours PRN Nausea and/or Vomiting  sodium chloride 0.9% lock flush 10 milliLiter(s) IV Push every 1 hour PRN Pre/post blood products, medications, blood draw, and to maintain line patency      Vital Signs Last 24 Hrs  T(C): 37.3 (30 Jun 2023 09:25), Max: 37.6 (30 Jun 2023 04:00)  T(F): 99.1 (30 Jun 2023 09:25), Max: 99.7 (30 Jun 2023 04:00)  HR: 94 (30 Jun 2023 10:54) (86 - 94)  BP: 135/77 (30 Jun 2023 10:54) (135/77 - 142/74)  BP(mean): 100 (30 Jun 2023 10:54) (100 - 105)  RR: 16 (30 Jun 2023 10:54) (16 - 18)  SpO2: 96% (30 Jun 2023 10:54) (94% - 97%)    Parameters below as of 30 Jun 2023 10:54  Patient On (Oxygen Delivery Method): room air        PHYSICAL EXAM:      Constitutional: A&Ox3    Breasts:    Respiratory: non labored breathing, no respiratory distress    Cardiovascular: NSR, RRR    Gastrointestinal:                 Incision:    Genitourinary:    Extremities: (-) edema                  I&O's Detail    29 Jun 2023 07:01  -  30 Jun 2023 07:00  --------------------------------------------------------  IN:    dextrose 5% + sodium chloride 0.45%: 1920 mL    Lactated Ringers Bolus: 500 mL  Total IN: 2420 mL    OUT:    Voided (mL): 950 mL  Total OUT: 950 mL    Total NET: 1470 mL          LABS:                        6.5    15.49 )-----------( 508      ( 30 Jun 2023 05:30 )             20.8     06-30    136  |  107  |  10  ----------------------------<  81  3.7   |  22  |  0.58    Ca    8.2<L>      30 Jun 2023 05:30  Phos  3.8     06-30  Mg     1.8     06-30    TPro  5.6<L>  /  Alb  2.2<L>  /  TBili  0.4  /  DBili  x   /  AST  12  /  ALT  9<L>  /  AlkPhos  96  06-30      Urinalysis Basic - ( 30 Jun 2023 05:30 )    Color: x / Appearance: x / SG: x / pH: x  Gluc: 81 mg/dL / Ketone: x  / Bili: x / Urobili: x   Blood: x / Protein: x / Nitrite: x   Leuk Esterase: x / RBC: x / WBC x   Sq Epi: x / Non Sq Epi: x / Bacteria: x        RADIOLOGY & ADDITIONAL STUDIES: INTERVAL HPI/OVERNIGHT EVENTS: nausea improving. +F/+BM. dilaudid changed to 0.5mg IV breakthrough. low UOP, given 500cc around midnight.     STATUS POST:    5/5: second stage TEVAR  5/13: IR Celiac, SMA, splenic, and left gastric artery angiogram reveals no pseudoaneurysm or any active bleeding.  5/22:  IR Aspiration of left peripancreatic fluid collection  (15cc sanguinous) and paracentesis (4L clear yellow fluid)  5/24: IR L peripancreatic abscess collection drainage and placement of 12F drainage catheter  5/25: Rosalee upsized existing abscess drain to 16F, placed new drain to another abscess collection on left, and two drain for ascites.  5/26: Placement of two new drainage catheters and exchange of two   drainage catheters  5/30: IR LUQ drainage of 400cc purulent fluid  5/31: ex lap, wash out of ascites/old blood/scant new blood, all CODIE drain removal, CODIE x3 placement, abdomen open, UOP 0, EBL??  6/1: No bleeding, evac of old blood, placement of feeding J-tube, abd closure  6/5: Bedside trach (Pulm)  6/9: Bronchoscopy, lavage    SUBJECTIVE: Pt seen and examined at bedside this am by surgery team. Report some mild nausea, no emesis. Denies f/n/v/cp/sob.    MEDICATIONS  (STANDING):  acetaminophen   Oral Liquid .. 975 milliGRAM(s) Enteral Tube every 6 hours  albuterol/ipratropium for Nebulization 3 milliLiter(s) Nebulizer every 6 hours  artificial  tears Solution 1 Drop(s) Both EYES two times a day  aspirin  chewable 81 milliGRAM(s) Oral daily  dextrose 5% + sodium chloride 0.45%. 1000 milliLiter(s) (80 mL/Hr) IV Continuous <Continuous>  dextrose 5%. 1000 milliLiter(s) (100 mL/Hr) IV Continuous <Continuous>  dextrose 50% Injectable 25 Gram(s) IV Push once  glucagon  Injectable 1 milliGRAM(s) IntraMuscular once  heparin   Injectable 5000 Unit(s) SubCutaneous every 8 hours  insulin lispro (ADMELOG) corrective regimen sliding scale   SubCutaneous every 6 hours  lacosamide Solution 200 milliGRAM(s) Oral two times a day  loperamide Liquid 2 milliGRAM(s) Enteral Tube every 8 hours  metoprolol succinate  milliGRAM(s) Oral daily  multivitamin/minerals/iron Oral Solution (CENTRUM) 15 milliLiter(s) Oral daily  oxyCODONE    Solution 10 milliGRAM(s) Oral every 6 hours  pantoprazole  Injectable 40 milliGRAM(s) IV Push daily  potassium chloride   Powder 40 milliEquivalent(s) Oral once  sodium chloride 3%  Inhalation 4 milliLiter(s) Inhalation every 6 hours    MEDICATIONS  (PRN):  dextrose Oral Gel 15 Gram(s) Oral once PRN Blood Glucose LESS THAN 70 milliGRAM(s)/deciliter  HYDROmorphone  Injectable 0.5 milliGRAM(s) IV Push every 4 hours PRN breakthrough pain  ondansetron Injectable 4 milliGRAM(s) IV Push every 8 hours PRN Nausea and/or Vomiting  sodium chloride 0.9% lock flush 10 milliLiter(s) IV Push every 1 hour PRN Pre/post blood products, medications, blood draw, and to maintain line patency      Vital Signs Last 24 Hrs  T(C): 37.3 (30 Jun 2023 09:25), Max: 37.6 (30 Jun 2023 04:00)  T(F): 99.1 (30 Jun 2023 09:25), Max: 99.7 (30 Jun 2023 04:00)  HR: 94 (30 Jun 2023 10:54) (86 - 94)  BP: 135/77 (30 Jun 2023 10:54) (135/77 - 142/74)  BP(mean): 100 (30 Jun 2023 10:54) (100 - 105)  RR: 16 (30 Jun 2023 10:54) (16 - 18)  SpO2: 96% (30 Jun 2023 10:54) (94% - 97%)    Parameters below as of 30 Jun 2023 10:54  Patient On (Oxygen Delivery Method): room air        PHYSICAL EXAM:      Constitutional: A&Ox3, NAD    Respiratory: non labored breathing, no respiratory distress    Cardiovascular: NSR, RRR    Gastrointestinal: abdomen soft, mildly distended, ttp of midline, no rebound or guarding.                  Incision: CDI. old CODIE site healing well    Extremities: 2+ edema, non ttp. no calf tenderness    I&O's Detail    29 Jun 2023 07:01  -  30 Jun 2023 07:00  --------------------------------------------------------  IN:    dextrose 5% + sodium chloride 0.45%: 1920 mL    Lactated Ringers Bolus: 500 mL  Total IN: 2420 mL    OUT:    Voided (mL): 950 mL  Total OUT: 950 mL    Total NET: 1470 mL          LABS:                        6.5    15.49 )-----------( 508      ( 30 Jun 2023 05:30 )             20.8     06-30    136  |  107  |  10  ----------------------------<  81  3.7   |  22  |  0.58    Ca    8.2<L>      30 Jun 2023 05:30  Phos  3.8     06-30  Mg     1.8     06-30    TPro  5.6<L>  /  Alb  2.2<L>  /  TBili  0.4  /  DBili  x   /  AST  12  /  ALT  9<L>  /  AlkPhos  96  06-30      Urinalysis Basic - ( 30 Jun 2023 05:30 )    Color: x / Appearance: x / SG: x / pH: x  Gluc: 81 mg/dL / Ketone: x  / Bili: x / Urobili: x   Blood: x / Protein: x / Nitrite: x   Leuk Esterase: x / RBC: x / WBC x   Sq Epi: x / Non Sq Epi: x / Bacteria: x        RADIOLOGY & ADDITIONAL STUDIES:

## 2023-06-30 NOTE — CHART NOTE - NSCHARTNOTEFT_GEN_A_CORE
Admitting Diagnosis:   Patient is a 53y old  Male who presents with a chief complaint of endoleak, abdominal pain (2023 09:36)      PAST MEDICAL & SURGICAL HISTORY:  HTN (hypertension)      Aortic dissection      CAD (coronary artery disease)      Seizure disorder      Seizure disorder      Hypertension      Status post endovascular aneurysm repair (EVAR)      S/P aortic bifurcation bypass graft      S/P CABG x 1      Current Nutrition Order:  NPO for procedure   : soft, bite-sized + Ensure Enlive TID (350Kcal, 20g protein each)    PO Intake: Good (%) [   ]  Fair (50-75%) [   ] Poor (<25%) [   ] - NA, NPO    GI Issues: Remains with nausea and diarrhea     Pain: no pain noted during assessment     Skin Integrity:  Surgical incision to midline; skin tear to L knee  PUs: stg II to sacrum; stg II to L scapula;   Edema 2+ to RL foot   jonathan scale 12     Labs:       136  |  107  |  10  ----------------------------<  81  3.7   |  22  |  0.58    Ca    8.2<L>      2023 05:30  Phos  3.8     06-30  Mg     1.8     06-30    TPro  5.6<L>  /  Alb  2.2<L>  /  TBili  0.4  /  DBili  x   /  AST  12  /  ALT  9<L>  /  AlkPhos  96  06-30    CAPILLARY BLOOD GLUCOSE      POCT Blood Glucose.: 96 mg/dL (2023 06:41)  POCT Blood Glucose.: 95 mg/dL (2023 00:30)  POCT Blood Glucose.: 99 mg/dL (2023 17:45)      Medications:  MEDICATIONS  (STANDING):  acetaminophen   Oral Liquid .. 975 milliGRAM(s) Enteral Tube every 6 hours  albuterol/ipratropium for Nebulization 3 milliLiter(s) Nebulizer every 6 hours  artificial  tears Solution 1 Drop(s) Both EYES two times a day  aspirin  chewable 81 milliGRAM(s) Oral daily  dextrose 5% + sodium chloride 0.45%. 1000 milliLiter(s) (80 mL/Hr) IV Continuous <Continuous>  dextrose 5%. 1000 milliLiter(s) (100 mL/Hr) IV Continuous <Continuous>  dextrose 50% Injectable 25 Gram(s) IV Push once  glucagon  Injectable 1 milliGRAM(s) IntraMuscular once  heparin   Injectable 5000 Unit(s) SubCutaneous every 8 hours  insulin lispro (ADMELOG) corrective regimen sliding scale   SubCutaneous every 6 hours  lacosamide Solution 200 milliGRAM(s) Oral two times a day  loperamide Liquid 2 milliGRAM(s) Enteral Tube every 8 hours  metoprolol succinate  milliGRAM(s) Oral daily  multivitamin/minerals/iron Oral Solution (CENTRUM) 15 milliLiter(s) Oral daily  oxyCODONE    Solution 10 milliGRAM(s) Oral every 6 hours  pantoprazole  Injectable 40 milliGRAM(s) IV Push daily  sodium chloride 3%  Inhalation 4 milliLiter(s) Inhalation every 6 hours    MEDICATIONS  (PRN):  dextrose Oral Gel 15 Gram(s) Oral once PRN Blood Glucose LESS THAN 70 milliGRAM(s)/deciliter  HYDROmorphone  Injectable 0.5 milliGRAM(s) IV Push every 4 hours PRN breakthrough pain  ondansetron Injectable 4 milliGRAM(s) IV Push every 8 hours PRN Nausea and/or Vomiting  sodium chloride 0.9% lock flush 10 milliLiter(s) IV Push every 1 hour PRN Pre/post blood products, medications, blood draw, and to maintain line patency      Weight:  Daily     Daily     Weight Change: no new wts to review at this time; last wt taken on .  Please obtain new wt and then biweekly wts to monitor trends and better assess nutrition adequacy     Estimated energy needs:   Previous wt of 70kg used for energy calculations as falls within % IBW  Needs adjusted for age, clinical conditions, pressure ulcers  25-30 kcals/[id number] kcals/day  1.2-1.7 g/k-119g protein/day  Fluids per team.      Subjective:   53yMale w/ PMHx of HTN, type A aortic dissection s/p Dacron grafts, AV resuspension in , CAD s/p CABG x 1 SVG to RCA (2013 with Dr. Medina), seizure disorder, recent admission 3/20- for replacement of transverse aortic arch, second stage TEVAR and aorto-axillary bypass, AV replacement (bio 23mm), and CABG x 1 (SVG-RCA). Pt found to have endo leak and taken to OR for TEVAR revision on , Post op course c/b Klebsiella bacteremia (5/15), resp failure requiring intubation on , Mucus plugging s/p Bronch  and PEA arrest. Post operatively pt also found to have hemorrhagic pancreatitis with venu-pancreatic abscess possibly 2* to Depakote therefore s/p IR aspiration of peripancreatic fluid collection and paracentesis on , IR venu-pancreatic abscess drainage and placement of drainage catheter on . Pt then transferred from CTICU to SICU for further mgmt of hemorrhagic pancreatitis on . s/p IR placement of 2 new drainage catheters and catheter exchange on . LUQ drainage . OR  exlap washout & replacement of 3 new JPs and abthera vac with open abdomen. RTOR , no bleeding, evac of old blood, placement of feeding J-tube. Failed extubation 6/3: s/p Trach . : adv to soft diet/thin liquids-minimal PO intake. decannulated today. stopped tube feeds. : persistent nausea, no additional emesis. Minimal PO intake.    Visited pt at bedside this am on 8LA. Wife in the room. On assessment pt is confused, assessment per wife report. PO started  (soft, bite-sized). Minimal PO intake/GI tolerance. On time of assessment pt is NPO for procedure. Per chart review, EN restarted via J-tube. Endorses nausea and diarrhea. Recommend adding Banatrol TID to help with diarrhea management. Nutrition related labs are unremarkable at this time. RD to remain available     Previous Nutrition Diagnosis:  Increased nutrients RT increased demands AEB Vent, Pressure ulcers    Active [  X ]  Resolved [   ]    Goal:  Pt will meet at least 75% of protein & energy needs via most appropriate route for nutrition     Recommendations:  1. Continue current TF regimen of Vital 1.0 @70ml/hr x 24hrs  > At goal, TF provides 1680 ml total volume, 1680kcals, 67.2g protein, 1404ml free water daily  > LPS TID (100Kcal, 15g protein each)  2. Maintain aspiration precautions at all times; monitor TF tolerance closely   3. Monitor GI fxn, skin integrity, chemistry, wts   4. Add Banatrol TID  5. Align nutrition with SLP; advance diet as tolerated   6. RD to remain available for recs adjustment prn or will follow up pt per organizational policy     Education: Discussed diet/EN regimen with wife    Risk Level: High [ X ] Moderate [   ] Low [   ]

## 2023-07-01 LAB
ALBUMIN SERPL ELPH-MCNC: 2.3 G/DL — LOW (ref 3.3–5)
ALP SERPL-CCNC: 184 U/L — HIGH (ref 40–120)
ALT FLD-CCNC: 11 U/L — SIGNIFICANT CHANGE UP (ref 10–45)
ANION GAP SERPL CALC-SCNC: 10 MMOL/L — SIGNIFICANT CHANGE UP (ref 5–17)
APPEARANCE UR: CLEAR — SIGNIFICANT CHANGE UP
AST SERPL-CCNC: 15 U/L — SIGNIFICANT CHANGE UP (ref 10–40)
BACTERIA # UR AUTO: ABNORMAL /HPF
BILIRUB SERPL-MCNC: 0.5 MG/DL — SIGNIFICANT CHANGE UP (ref 0.2–1.2)
BILIRUB UR-MCNC: NEGATIVE — SIGNIFICANT CHANGE UP
BUN SERPL-MCNC: 10 MG/DL — SIGNIFICANT CHANGE UP (ref 7–23)
CALCIUM SERPL-MCNC: 8.1 MG/DL — LOW (ref 8.4–10.5)
CHLORIDE SERPL-SCNC: 106 MMOL/L — SIGNIFICANT CHANGE UP (ref 96–108)
CO2 SERPL-SCNC: 20 MMOL/L — LOW (ref 22–31)
COLOR SPEC: YELLOW — SIGNIFICANT CHANGE UP
COMMENT - URINE: SIGNIFICANT CHANGE UP
CREAT SERPL-MCNC: 0.58 MG/DL — SIGNIFICANT CHANGE UP (ref 0.5–1.3)
DIFF PNL FLD: NEGATIVE — SIGNIFICANT CHANGE UP
EGFR: 116 ML/MIN/1.73M2 — SIGNIFICANT CHANGE UP
EPI CELLS # UR: SIGNIFICANT CHANGE UP /HPF (ref 0–5)
GLUCOSE BLDC GLUCOMTR-MCNC: 109 MG/DL — HIGH (ref 70–99)
GLUCOSE BLDC GLUCOMTR-MCNC: 109 MG/DL — HIGH (ref 70–99)
GLUCOSE BLDC GLUCOMTR-MCNC: 96 MG/DL — SIGNIFICANT CHANGE UP (ref 70–99)
GLUCOSE SERPL-MCNC: 86 MG/DL — SIGNIFICANT CHANGE UP (ref 70–99)
GLUCOSE UR QL: NEGATIVE — SIGNIFICANT CHANGE UP
GRAN CASTS # UR COMP ASSIST: ABNORMAL /LPF
HCT VFR BLD CALC: 25.1 % — LOW (ref 39–50)
HGB BLD-MCNC: 8 G/DL — LOW (ref 13–17)
HYALINE CASTS # UR AUTO: SIGNIFICANT CHANGE UP /LPF (ref 0–2)
KETONES UR-MCNC: NEGATIVE — SIGNIFICANT CHANGE UP
LEUKOCYTE ESTERASE UR-ACNC: NEGATIVE — SIGNIFICANT CHANGE UP
MAGNESIUM SERPL-MCNC: 2 MG/DL — SIGNIFICANT CHANGE UP (ref 1.6–2.6)
MCHC RBC-ENTMCNC: 29.5 PG — SIGNIFICANT CHANGE UP (ref 27–34)
MCHC RBC-ENTMCNC: 31.9 GM/DL — LOW (ref 32–36)
MCV RBC AUTO: 92.6 FL — SIGNIFICANT CHANGE UP (ref 80–100)
NITRITE UR-MCNC: POSITIVE
NRBC # BLD: 0 /100 WBCS — SIGNIFICANT CHANGE UP (ref 0–0)
PH UR: 6.5 — SIGNIFICANT CHANGE UP (ref 5–8)
PHOSPHATE SERPL-MCNC: 4 MG/DL — SIGNIFICANT CHANGE UP (ref 2.5–4.5)
PLATELET # BLD AUTO: 502 K/UL — HIGH (ref 150–400)
POTASSIUM SERPL-MCNC: 4.3 MMOL/L — SIGNIFICANT CHANGE UP (ref 3.5–5.3)
POTASSIUM SERPL-SCNC: 4.3 MMOL/L — SIGNIFICANT CHANGE UP (ref 3.5–5.3)
PROT SERPL-MCNC: 6.3 G/DL — SIGNIFICANT CHANGE UP (ref 6–8.3)
PROT UR-MCNC: 30 MG/DL
RBC # BLD: 2.71 M/UL — LOW (ref 4.2–5.8)
RBC # FLD: 18.8 % — HIGH (ref 10.3–14.5)
RBC CASTS # UR COMP ASSIST: < 5 /HPF — SIGNIFICANT CHANGE UP
SODIUM SERPL-SCNC: 136 MMOL/L — SIGNIFICANT CHANGE UP (ref 135–145)
SP GR SPEC: 1.02 — SIGNIFICANT CHANGE UP (ref 1–1.03)
UROBILINOGEN FLD QL: 0.2 E.U./DL — SIGNIFICANT CHANGE UP
WBC # BLD: 26.21 K/UL — HIGH (ref 3.8–10.5)
WBC # FLD AUTO: 26.21 K/UL — HIGH (ref 3.8–10.5)
WBC UR QL: < 5 /HPF — SIGNIFICANT CHANGE UP

## 2023-07-01 PROCEDURE — 71045 X-RAY EXAM CHEST 1 VIEW: CPT | Mod: 26

## 2023-07-01 RX ORDER — SODIUM CHLORIDE 9 MG/ML
500 INJECTION, SOLUTION INTRAVENOUS ONCE
Refills: 0 | Status: COMPLETED | OUTPATIENT
Start: 2023-07-01 | End: 2023-07-01

## 2023-07-01 RX ORDER — IOHEXOL 300 MG/ML
30 INJECTION, SOLUTION INTRAVENOUS ONCE
Refills: 0 | Status: DISCONTINUED | OUTPATIENT
Start: 2023-07-01 | End: 2023-07-01

## 2023-07-01 RX ADMIN — OXYCODONE HYDROCHLORIDE 10 MILLIGRAM(S): 5 TABLET ORAL at 18:16

## 2023-07-01 RX ADMIN — Medication 975 MILLIGRAM(S): at 18:16

## 2023-07-01 RX ADMIN — OXYCODONE HYDROCHLORIDE 10 MILLIGRAM(S): 5 TABLET ORAL at 07:25

## 2023-07-01 RX ADMIN — SODIUM CHLORIDE 500 MILLILITER(S): 9 INJECTION, SOLUTION INTRAVENOUS at 06:54

## 2023-07-01 RX ADMIN — OXYCODONE HYDROCHLORIDE 10 MILLIGRAM(S): 5 TABLET ORAL at 02:15

## 2023-07-01 RX ADMIN — OXYCODONE HYDROCHLORIDE 10 MILLIGRAM(S): 5 TABLET ORAL at 00:15

## 2023-07-01 RX ADMIN — SODIUM CHLORIDE 4 MILLILITER(S): 9 INJECTION INTRAMUSCULAR; INTRAVENOUS; SUBCUTANEOUS at 00:14

## 2023-07-01 RX ADMIN — SODIUM CHLORIDE 1000 MILLILITER(S): 9 INJECTION, SOLUTION INTRAVENOUS at 10:40

## 2023-07-01 RX ADMIN — Medication 975 MILLIGRAM(S): at 07:26

## 2023-07-01 RX ADMIN — Medication 2 MILLIGRAM(S): at 21:20

## 2023-07-01 RX ADMIN — Medication 2 MILLIGRAM(S): at 06:55

## 2023-07-01 RX ADMIN — HYDROMORPHONE HYDROCHLORIDE 0.5 MILLIGRAM(S): 2 INJECTION INTRAMUSCULAR; INTRAVENOUS; SUBCUTANEOUS at 21:19

## 2023-07-01 RX ADMIN — Medication 975 MILLIGRAM(S): at 02:15

## 2023-07-01 RX ADMIN — HYDROMORPHONE HYDROCHLORIDE 0.5 MILLIGRAM(S): 2 INJECTION INTRAMUSCULAR; INTRAVENOUS; SUBCUTANEOUS at 05:25

## 2023-07-01 RX ADMIN — HEPARIN SODIUM 5000 UNIT(S): 5000 INJECTION INTRAVENOUS; SUBCUTANEOUS at 21:20

## 2023-07-01 RX ADMIN — SCOPALAMINE 1 PATCH: 1 PATCH, EXTENDED RELEASE TRANSDERMAL at 23:14

## 2023-07-01 RX ADMIN — Medication 81 MILLIGRAM(S): at 11:49

## 2023-07-01 RX ADMIN — Medication 1 DROP(S): at 06:55

## 2023-07-01 RX ADMIN — Medication 975 MILLIGRAM(S): at 00:14

## 2023-07-01 RX ADMIN — SODIUM CHLORIDE 4 MILLILITER(S): 9 INJECTION INTRAMUSCULAR; INTRAVENOUS; SUBCUTANEOUS at 11:48

## 2023-07-01 RX ADMIN — PANTOPRAZOLE SODIUM 40 MILLIGRAM(S): 20 TABLET, DELAYED RELEASE ORAL at 11:49

## 2023-07-01 RX ADMIN — SODIUM CHLORIDE 4 MILLILITER(S): 9 INJECTION INTRAMUSCULAR; INTRAVENOUS; SUBCUTANEOUS at 17:51

## 2023-07-01 RX ADMIN — Medication 975 MILLIGRAM(S): at 06:56

## 2023-07-01 RX ADMIN — OXYCODONE HYDROCHLORIDE 10 MILLIGRAM(S): 5 TABLET ORAL at 06:55

## 2023-07-01 RX ADMIN — Medication 100 MILLIGRAM(S): at 06:56

## 2023-07-01 RX ADMIN — LACOSAMIDE 200 MILLIGRAM(S): 50 TABLET ORAL at 17:51

## 2023-07-01 RX ADMIN — LACOSAMIDE 200 MILLIGRAM(S): 50 TABLET ORAL at 06:57

## 2023-07-01 RX ADMIN — Medication 2 MILLIGRAM(S): at 14:31

## 2023-07-01 RX ADMIN — HYDROMORPHONE HYDROCHLORIDE 0.5 MILLIGRAM(S): 2 INJECTION INTRAMUSCULAR; INTRAVENOUS; SUBCUTANEOUS at 23:14

## 2023-07-01 RX ADMIN — Medication 3 MILLILITER(S): at 17:51

## 2023-07-01 RX ADMIN — Medication 975 MILLIGRAM(S): at 12:15

## 2023-07-01 RX ADMIN — OXYCODONE HYDROCHLORIDE 10 MILLIGRAM(S): 5 TABLET ORAL at 11:48

## 2023-07-01 RX ADMIN — Medication 3 MILLILITER(S): at 23:59

## 2023-07-01 RX ADMIN — SCOPALAMINE 1 PATCH: 1 PATCH, EXTENDED RELEASE TRANSDERMAL at 06:34

## 2023-07-01 RX ADMIN — HEPARIN SODIUM 5000 UNIT(S): 5000 INJECTION INTRAVENOUS; SUBCUTANEOUS at 14:31

## 2023-07-01 RX ADMIN — Medication 3 MILLILITER(S): at 06:55

## 2023-07-01 RX ADMIN — Medication 1 DROP(S): at 17:51

## 2023-07-01 RX ADMIN — SODIUM CHLORIDE 4 MILLILITER(S): 9 INJECTION INTRAMUSCULAR; INTRAVENOUS; SUBCUTANEOUS at 06:57

## 2023-07-01 RX ADMIN — OXYCODONE HYDROCHLORIDE 10 MILLIGRAM(S): 5 TABLET ORAL at 12:15

## 2023-07-01 RX ADMIN — Medication 975 MILLIGRAM(S): at 11:48

## 2023-07-01 RX ADMIN — SODIUM CHLORIDE 4 MILLILITER(S): 9 INJECTION INTRAMUSCULAR; INTRAVENOUS; SUBCUTANEOUS at 23:59

## 2023-07-01 RX ADMIN — HYDROMORPHONE HYDROCHLORIDE 0.5 MILLIGRAM(S): 2 INJECTION INTRAMUSCULAR; INTRAVENOUS; SUBCUTANEOUS at 02:15

## 2023-07-01 RX ADMIN — OXYCODONE HYDROCHLORIDE 10 MILLIGRAM(S): 5 TABLET ORAL at 17:50

## 2023-07-01 RX ADMIN — SODIUM CHLORIDE 500 MILLILITER(S): 9 INJECTION, SOLUTION INTRAVENOUS at 19:08

## 2023-07-01 RX ADMIN — HYDROMORPHONE HYDROCHLORIDE 0.5 MILLIGRAM(S): 2 INJECTION INTRAMUSCULAR; INTRAVENOUS; SUBCUTANEOUS at 04:55

## 2023-07-01 RX ADMIN — Medication 3 MILLILITER(S): at 00:14

## 2023-07-01 RX ADMIN — Medication 3 MILLILITER(S): at 11:48

## 2023-07-01 RX ADMIN — Medication 975 MILLIGRAM(S): at 17:50

## 2023-07-01 RX ADMIN — Medication 15 MILLILITER(S): at 12:15

## 2023-07-01 RX ADMIN — HEPARIN SODIUM 5000 UNIT(S): 5000 INJECTION INTRAVENOUS; SUBCUTANEOUS at 06:57

## 2023-07-01 RX ADMIN — SCOPALAMINE 1 PATCH: 1 PATCH, EXTENDED RELEASE TRANSDERMAL at 19:44

## 2023-07-01 RX ADMIN — Medication 975 MILLIGRAM(S): at 23:59

## 2023-07-01 NOTE — PROGRESS NOTE ADULT - SUBJECTIVE AND OBJECTIVE BOX
Overnight events: questionable IV infiltration, new pIV placed by nursing. fluids restarted around 5am. tube feeds running. low uop (500cc bolus given).     SUBJECTIVE:  Patient seen at bedside with chief resident. Reports feeling tired, denies nausea and vomiting.    MEDICATIONS  (STANDING):  acetaminophen   Oral Liquid .. 975 milliGRAM(s) Enteral Tube every 6 hours  albuterol/ipratropium for Nebulization 3 milliLiter(s) Nebulizer every 6 hours  artificial  tears Solution 1 Drop(s) Both EYES two times a day  aspirin  chewable 81 milliGRAM(s) Oral daily  dextrose 5% + sodium chloride 0.45%. 1000 milliLiter(s) (80 mL/Hr) IV Continuous <Continuous>  dextrose 5%. 1000 milliLiter(s) (100 mL/Hr) IV Continuous <Continuous>  dextrose 50% Injectable 25 Gram(s) IV Push once  glucagon  Injectable 1 milliGRAM(s) IntraMuscular once  heparin   Injectable 5000 Unit(s) SubCutaneous every 8 hours  insulin lispro (ADMELOG) corrective regimen sliding scale   SubCutaneous every 6 hours  lacosamide Solution 200 milliGRAM(s) Oral two times a day  loperamide Liquid 2 milliGRAM(s) Enteral Tube every 8 hours  metoprolol succinate  milliGRAM(s) Oral daily  multivitamin/minerals/iron Oral Solution (CENTRUM) 15 milliLiter(s) Oral daily  oxyCODONE    Solution 10 milliGRAM(s) Oral every 6 hours  pantoprazole  Injectable 40 milliGRAM(s) IV Push daily  sodium chloride 3%  Inhalation 4 milliLiter(s) Inhalation every 6 hours    MEDICATIONS  (PRN):  dextrose Oral Gel 15 Gram(s) Oral once PRN Blood Glucose LESS THAN 70 milliGRAM(s)/deciliter  HYDROmorphone  Injectable 0.5 milliGRAM(s) IV Push every 4 hours PRN breakthrough pain  ondansetron Injectable 4 milliGRAM(s) IV Push every 8 hours PRN Nausea and/or Vomiting  sodium chloride 0.9% lock flush 10 milliLiter(s) IV Push every 1 hour PRN Pre/post blood products, medications, blood draw, and to maintain line patency      Vital Signs Last 24 Hrs  T(C): 36.9 (01 Jul 2023 04:51), Max: 37.6 (30 Jun 2023 16:35)  T(F): 98.4 (01 Jul 2023 04:51), Max: 99.7 (30 Jun 2023 16:35)  HR: 108 (01 Jul 2023 08:20) (86 - 108)  BP: 136/80 (01 Jul 2023 08:20) (135/77 - 160/91)  BP(mean): 103 (01 Jul 2023 08:20) (100 - 119)  RR: 18 (01 Jul 2023 08:20) (16 - 18)  SpO2: 97% (01 Jul 2023 08:20) (96% - 99%)    Parameters below as of 01 Jul 2023 08:20  Patient On (Oxygen Delivery Method): room air        Physical Exam:  General: NAD, resting comfortably in bed  Pulmonary: Nonlabored breathing, no respiratory distress  Cardiovascular: NSR  Abdominal: pt deferred abd exam at this time, J-tube in place and attached to feeds  Extremities: WWP, normal strength  Neuro: A/O x 3, CNs II-XII grossly intact, no focal deficits, normal motor/sensation  Pulses: palpable distal pulses    I&O's Summary    30 Jun 2023 07:01  -  01 Jul 2023 07:00  --------------------------------------------------------  IN: 1800 mL / OUT: 675 mL / NET: 1125 mL        LABS:                        8.0    26.21 )-----------( 502      ( 01 Jul 2023 05:30 )             25.1     07-01    136  |  106  |  10  ----------------------------<  86  4.3   |  20<L>  |  0.58    Ca    8.1<L>      01 Jul 2023 05:30  Phos  4.0     07-01  Mg     2.0     07-01    TPro  6.3  /  Alb  2.3<L>  /  TBili  0.5  /  DBili  x   /  AST  15  /  ALT  11  /  AlkPhos  184<H>  07-01      Urinalysis Basic - ( 01 Jul 2023 05:30 )    Color: x / Appearance: x / SG: x / pH: x  Gluc: 86 mg/dL / Ketone: x  / Bili: x / Urobili: x   Blood: x / Protein: x / Nitrite: x   Leuk Esterase: x / RBC: x / WBC x   Sq Epi: x / Non Sq Epi: x / Bacteria: x      CAPILLARY BLOOD GLUCOSE      POCT Blood Glucose.: 96 mg/dL (01 Jul 2023 06:10)  POCT Blood Glucose.: 96 mg/dL (30 Jun 2023 23:22)  POCT Blood Glucose.: 106 mg/dL (30 Jun 2023 17:29)  POCT Blood Glucose.: 80 mg/dL (30 Jun 2023 13:51)    LIVER FUNCTIONS - ( 01 Jul 2023 05:30 )  Alb: 2.3 g/dL / Pro: 6.3 g/dL / ALK PHOS: 184 U/L / ALT: 11 U/L / AST: 15 U/L / GGT: x             RADIOLOGY & ADDITIONAL STUDIES:

## 2023-07-01 NOTE — PROGRESS NOTE ADULT - ASSESSMENT
53M w PMHx of HTN, type A aortic dissection s/p Dacron grafts, AV resuspension in 2013, CAD s/p CABG x 1 SVG to RCA (5/2013 with Dr. Medina), seizure disorder, recent admission 3/20-4/14 for replacement of transverse aortic arch, second stage TEVAR and aorto-axillary bypass, AV replacement (bio 23mm), and CABG x 1 (SVG-RCA). Pt found to have endo leak and taken to OR for TEVAR revision on 5/5. Post op course c/b Klebsiella bacteremia (5/15), resp failure requiring intubation on 5/18, Mucus plugging s/p Bronch 5/19 and PEA arrest. Post operatively pt also found to have hemorrhagic pancreatitis with venu-pancreatic abscess possibly 2* to Depakote therefore s/p IR aspiration of peripancreatic fluid collection and paracentesis on 5/22, IR venu-pancreatic abscess drainage and placement of drainage catheter on 5/24. Pt then transferred from CTICU to SICU for septic shock, and further mgmt of hemorrhagic pancreatitis on 5/25. s/p IR placement of 2 new drainage catheters and catheter exchange on 5/26. LUQ drainage 5/30. OR 5/31 for exlap washout & replacement of 3 new JPs and abthera vac with open abdomen. RTOR 6/1, no bleeding, evac of old blood, placement of feeding J-tube. failed extubation s/p trach 6/5 with pneumonia.    NPO w/ tube feeds, IVF  Trach decannulated 6/28  metoprolol QD  ASA/SQH  ISS  oxycodone 10mg Q6H standing    Pending discussion with covering attending Dr. Peralta, will update with new plan 53M w PMHx of HTN, type A aortic dissection s/p Dacron grafts, AV resuspension in 2013, CAD s/p CABG x 1 SVG to RCA (5/2013 with Dr. Medina), seizure disorder, recent admission 3/20-4/14 for replacement of transverse aortic arch, second stage TEVAR and aorto-axillary bypass, AV replacement (bio 23mm), and CABG x 1 (SVG-RCA). Pt found to have endo leak and taken to OR for TEVAR revision on 5/5. Post op course c/b Klebsiella bacteremia (5/15), resp failure requiring intubation on 5/18, Mucus plugging s/p Bronch 5/19 and PEA arrest. Post operatively pt also found to have hemorrhagic pancreatitis with venu-pancreatic abscess possibly 2* to Depakote therefore s/p IR aspiration of peripancreatic fluid collection and paracentesis on 5/22, IR venu-pancreatic abscess drainage and placement of drainage catheter on 5/24. Pt then transferred from CTICU to SICU for septic shock, and further mgmt of hemorrhagic pancreatitis on 5/25. s/p IR placement of 2 new drainage catheters and catheter exchange on 5/26. LUQ drainage 5/30. OR 5/31 for exlap washout & replacement of 3 new JPs and abthera vac with open abdomen. RTOR 6/1, no bleeding, evac of old blood, placement of feeding J-tube. failed extubation s/p trach 6/5 with pneumonia.    CT Abd/Pel w/ PO contrast  NPO w/ tube feeds, IVF  Trach decannulated 6/28  metoprolol QD  ASA/SQH  ISS  oxycodone 10mg Q6H standing

## 2023-07-02 LAB
ALBUMIN SERPL ELPH-MCNC: 2.3 G/DL — LOW (ref 3.3–5)
ALP SERPL-CCNC: 166 U/L — HIGH (ref 40–120)
ALT FLD-CCNC: 10 U/L — SIGNIFICANT CHANGE UP (ref 10–45)
ANION GAP SERPL CALC-SCNC: 8 MMOL/L — SIGNIFICANT CHANGE UP (ref 5–17)
ANION GAP SERPL CALC-SCNC: 9 MMOL/L — SIGNIFICANT CHANGE UP (ref 5–17)
AST SERPL-CCNC: 15 U/L — SIGNIFICANT CHANGE UP (ref 10–40)
BILIRUB SERPL-MCNC: 0.5 MG/DL — SIGNIFICANT CHANGE UP (ref 0.2–1.2)
BLD GP AB SCN SERPL QL: NEGATIVE — SIGNIFICANT CHANGE UP
BUN SERPL-MCNC: 8 MG/DL — SIGNIFICANT CHANGE UP (ref 7–23)
BUN SERPL-MCNC: 8 MG/DL — SIGNIFICANT CHANGE UP (ref 7–23)
CALCIUM SERPL-MCNC: 7.7 MG/DL — LOW (ref 8.4–10.5)
CALCIUM SERPL-MCNC: 8 MG/DL — LOW (ref 8.4–10.5)
CHLORIDE SERPL-SCNC: 106 MMOL/L — SIGNIFICANT CHANGE UP (ref 96–108)
CHLORIDE SERPL-SCNC: 107 MMOL/L — SIGNIFICANT CHANGE UP (ref 96–108)
CO2 SERPL-SCNC: 20 MMOL/L — LOW (ref 22–31)
CO2 SERPL-SCNC: 20 MMOL/L — LOW (ref 22–31)
CREAT SERPL-MCNC: 0.53 MG/DL — SIGNIFICANT CHANGE UP (ref 0.5–1.3)
CREAT SERPL-MCNC: 0.56 MG/DL — SIGNIFICANT CHANGE UP (ref 0.5–1.3)
EGFR: 117 ML/MIN/1.73M2 — SIGNIFICANT CHANGE UP
EGFR: 119 ML/MIN/1.73M2 — SIGNIFICANT CHANGE UP
GLUCOSE BLDC GLUCOMTR-MCNC: 106 MG/DL — HIGH (ref 70–99)
GLUCOSE BLDC GLUCOMTR-MCNC: 121 MG/DL — HIGH (ref 70–99)
GLUCOSE BLDC GLUCOMTR-MCNC: 87 MG/DL — SIGNIFICANT CHANGE UP (ref 70–99)
GLUCOSE BLDC GLUCOMTR-MCNC: 88 MG/DL — SIGNIFICANT CHANGE UP (ref 70–99)
GLUCOSE BLDC GLUCOMTR-MCNC: 97 MG/DL — SIGNIFICANT CHANGE UP (ref 70–99)
GLUCOSE SERPL-MCNC: 100 MG/DL — HIGH (ref 70–99)
GLUCOSE SERPL-MCNC: 118 MG/DL — HIGH (ref 70–99)
HCT VFR BLD CALC: 23.6 % — LOW (ref 39–50)
HCT VFR BLD CALC: 25.7 % — LOW (ref 39–50)
HGB BLD-MCNC: 7.8 G/DL — LOW (ref 13–17)
HGB BLD-MCNC: 7.9 G/DL — LOW (ref 13–17)
MAGNESIUM SERPL-MCNC: 1.8 MG/DL — SIGNIFICANT CHANGE UP (ref 1.6–2.6)
MAGNESIUM SERPL-MCNC: 1.8 MG/DL — SIGNIFICANT CHANGE UP (ref 1.6–2.6)
MCHC RBC-ENTMCNC: 29.5 PG — SIGNIFICANT CHANGE UP (ref 27–34)
MCHC RBC-ENTMCNC: 30.6 PG — SIGNIFICANT CHANGE UP (ref 27–34)
MCHC RBC-ENTMCNC: 30.7 GM/DL — LOW (ref 32–36)
MCHC RBC-ENTMCNC: 33.1 GM/DL — SIGNIFICANT CHANGE UP (ref 32–36)
MCV RBC AUTO: 92.5 FL — SIGNIFICANT CHANGE UP (ref 80–100)
MCV RBC AUTO: 95.9 FL — SIGNIFICANT CHANGE UP (ref 80–100)
NRBC # BLD: 0 /100 WBCS — SIGNIFICANT CHANGE UP (ref 0–0)
NRBC # BLD: 0 /100 WBCS — SIGNIFICANT CHANGE UP (ref 0–0)
PHOSPHATE SERPL-MCNC: 3.6 MG/DL — SIGNIFICANT CHANGE UP (ref 2.5–4.5)
PHOSPHATE SERPL-MCNC: 3.8 MG/DL — SIGNIFICANT CHANGE UP (ref 2.5–4.5)
PLATELET # BLD AUTO: 461 K/UL — HIGH (ref 150–400)
PLATELET # BLD AUTO: 507 K/UL — HIGH (ref 150–400)
POTASSIUM SERPL-MCNC: 4.2 MMOL/L — SIGNIFICANT CHANGE UP (ref 3.5–5.3)
POTASSIUM SERPL-MCNC: 4.2 MMOL/L — SIGNIFICANT CHANGE UP (ref 3.5–5.3)
POTASSIUM SERPL-SCNC: 4.2 MMOL/L — SIGNIFICANT CHANGE UP (ref 3.5–5.3)
POTASSIUM SERPL-SCNC: 4.2 MMOL/L — SIGNIFICANT CHANGE UP (ref 3.5–5.3)
PROT SERPL-MCNC: 6.2 G/DL — SIGNIFICANT CHANGE UP (ref 6–8.3)
RBC # BLD: 2.55 M/UL — LOW (ref 4.2–5.8)
RBC # BLD: 2.68 M/UL — LOW (ref 4.2–5.8)
RBC # FLD: 18.2 % — HIGH (ref 10.3–14.5)
RBC # FLD: 18.3 % — HIGH (ref 10.3–14.5)
RH IG SCN BLD-IMP: POSITIVE — SIGNIFICANT CHANGE UP
SODIUM SERPL-SCNC: 135 MMOL/L — SIGNIFICANT CHANGE UP (ref 135–145)
SODIUM SERPL-SCNC: 135 MMOL/L — SIGNIFICANT CHANGE UP (ref 135–145)
WBC # BLD: 25.8 K/UL — HIGH (ref 3.8–10.5)
WBC # BLD: 27.14 K/UL — HIGH (ref 3.8–10.5)
WBC # FLD AUTO: 25.8 K/UL — HIGH (ref 3.8–10.5)
WBC # FLD AUTO: 27.14 K/UL — HIGH (ref 3.8–10.5)

## 2023-07-02 PROCEDURE — 71260 CT THORAX DX C+: CPT | Mod: 26

## 2023-07-02 PROCEDURE — 74177 CT ABD & PELVIS W/CONTRAST: CPT | Mod: 26

## 2023-07-02 PROCEDURE — 36000 PLACE NEEDLE IN VEIN: CPT

## 2023-07-02 PROCEDURE — 99233 SBSQ HOSP IP/OBS HIGH 50: CPT | Mod: GC

## 2023-07-02 PROCEDURE — 71045 X-RAY EXAM CHEST 1 VIEW: CPT | Mod: 26

## 2023-07-02 PROCEDURE — 99233 SBSQ HOSP IP/OBS HIGH 50: CPT

## 2023-07-02 RX ORDER — MAGNESIUM SULFATE 500 MG/ML
1 VIAL (ML) INJECTION ONCE
Refills: 0 | Status: COMPLETED | OUTPATIENT
Start: 2023-07-02 | End: 2023-07-02

## 2023-07-02 RX ORDER — VANCOMYCIN HCL 1 G
1500 VIAL (EA) INTRAVENOUS EVERY 12 HOURS
Refills: 0 | Status: DISCONTINUED | OUTPATIENT
Start: 2023-07-02 | End: 2023-07-04

## 2023-07-02 RX ORDER — IOHEXOL 300 MG/ML
30 INJECTION, SOLUTION INTRAVENOUS ONCE
Refills: 0 | Status: COMPLETED | OUTPATIENT
Start: 2023-07-02 | End: 2023-07-02

## 2023-07-02 RX ORDER — ACETAMINOPHEN 500 MG
1000 TABLET ORAL ONCE
Refills: 0 | Status: COMPLETED | OUTPATIENT
Start: 2023-07-02 | End: 2023-07-02

## 2023-07-02 RX ORDER — MEROPENEM 1 G/30ML
1000 INJECTION INTRAVENOUS EVERY 8 HOURS
Refills: 0 | Status: DISCONTINUED | OUTPATIENT
Start: 2023-07-02 | End: 2023-07-05

## 2023-07-02 RX ADMIN — IOHEXOL 30 MILLILITER(S): 300 INJECTION, SOLUTION INTRAVENOUS at 13:11

## 2023-07-02 RX ADMIN — HEPARIN SODIUM 5000 UNIT(S): 5000 INJECTION INTRAVENOUS; SUBCUTANEOUS at 22:53

## 2023-07-02 RX ADMIN — SODIUM CHLORIDE 4 MILLILITER(S): 9 INJECTION INTRAMUSCULAR; INTRAVENOUS; SUBCUTANEOUS at 17:51

## 2023-07-02 RX ADMIN — Medication 2 MILLIGRAM(S): at 07:00

## 2023-07-02 RX ADMIN — Medication 2 MILLIGRAM(S): at 23:00

## 2023-07-02 RX ADMIN — OXYCODONE HYDROCHLORIDE 10 MILLIGRAM(S): 5 TABLET ORAL at 19:32

## 2023-07-02 RX ADMIN — Medication 3 MILLILITER(S): at 06:58

## 2023-07-02 RX ADMIN — Medication 100 MILLIGRAM(S): at 06:59

## 2023-07-02 RX ADMIN — Medication 3 MILLILITER(S): at 17:50

## 2023-07-02 RX ADMIN — Medication 975 MILLIGRAM(S): at 00:40

## 2023-07-02 RX ADMIN — Medication 975 MILLIGRAM(S): at 07:24

## 2023-07-02 RX ADMIN — Medication 100 GRAM(S): at 13:10

## 2023-07-02 RX ADMIN — OXYCODONE HYDROCHLORIDE 10 MILLIGRAM(S): 5 TABLET ORAL at 06:59

## 2023-07-02 RX ADMIN — SODIUM CHLORIDE 4 MILLILITER(S): 9 INJECTION INTRAMUSCULAR; INTRAVENOUS; SUBCUTANEOUS at 12:32

## 2023-07-02 RX ADMIN — Medication 1 DROP(S): at 22:53

## 2023-07-02 RX ADMIN — OXYCODONE HYDROCHLORIDE 10 MILLIGRAM(S): 5 TABLET ORAL at 07:24

## 2023-07-02 RX ADMIN — Medication 975 MILLIGRAM(S): at 06:59

## 2023-07-02 RX ADMIN — HYDROMORPHONE HYDROCHLORIDE 0.5 MILLIGRAM(S): 2 INJECTION INTRAMUSCULAR; INTRAVENOUS; SUBCUTANEOUS at 05:19

## 2023-07-02 RX ADMIN — MEROPENEM 100 MILLIGRAM(S): 1 INJECTION INTRAVENOUS at 23:52

## 2023-07-02 RX ADMIN — Medication 1 DROP(S): at 07:00

## 2023-07-02 RX ADMIN — OXYCODONE HYDROCHLORIDE 10 MILLIGRAM(S): 5 TABLET ORAL at 00:00

## 2023-07-02 RX ADMIN — Medication 3 MILLILITER(S): at 21:32

## 2023-07-02 RX ADMIN — Medication 3 MILLILITER(S): at 13:11

## 2023-07-02 RX ADMIN — Medication 400 MILLIGRAM(S): at 13:58

## 2023-07-02 RX ADMIN — MEROPENEM 100 MILLIGRAM(S): 1 INJECTION INTRAVENOUS at 16:58

## 2023-07-02 RX ADMIN — HEPARIN SODIUM 5000 UNIT(S): 5000 INJECTION INTRAVENOUS; SUBCUTANEOUS at 13:11

## 2023-07-02 RX ADMIN — LACOSAMIDE 200 MILLIGRAM(S): 50 TABLET ORAL at 19:31

## 2023-07-02 RX ADMIN — Medication 975 MILLIGRAM(S): at 23:53

## 2023-07-02 RX ADMIN — HEPARIN SODIUM 5000 UNIT(S): 5000 INJECTION INTRAVENOUS; SUBCUTANEOUS at 06:59

## 2023-07-02 RX ADMIN — Medication 975 MILLIGRAM(S): at 19:32

## 2023-07-02 RX ADMIN — OXYCODONE HYDROCHLORIDE 10 MILLIGRAM(S): 5 TABLET ORAL at 01:00

## 2023-07-02 RX ADMIN — SODIUM CHLORIDE 4 MILLILITER(S): 9 INJECTION INTRAMUSCULAR; INTRAVENOUS; SUBCUTANEOUS at 21:33

## 2023-07-02 RX ADMIN — Medication 300 MILLIGRAM(S): at 19:31

## 2023-07-02 RX ADMIN — LACOSAMIDE 200 MILLIGRAM(S): 50 TABLET ORAL at 06:59

## 2023-07-02 RX ADMIN — SODIUM CHLORIDE 4 MILLILITER(S): 9 INJECTION INTRAMUSCULAR; INTRAVENOUS; SUBCUTANEOUS at 06:58

## 2023-07-02 RX ADMIN — Medication 1000 MILLIGRAM(S): at 14:50

## 2023-07-02 RX ADMIN — HYDROMORPHONE HYDROCHLORIDE 0.5 MILLIGRAM(S): 2 INJECTION INTRAMUSCULAR; INTRAVENOUS; SUBCUTANEOUS at 05:46

## 2023-07-02 NOTE — CONSULT NOTE ADULT - ASSESSMENT
53M w PMHx of HTN, type A aortic dissection s/p Dacron grafts, AV resuspension in 2013, CAD s/p CABG x 1 SVG to RCA (5/2013 with Dr. Medina), seizure disorder, recent admission 3/20-4/14 for replacement of transverse aortic arch, second stage TEVAR and aorto-axillary bypass, AV replacement (bio 23mm), and CABG x 1 (SVG-RCA). Pt found to have endo leak and taken to OR for TEVAR revision on 5/5. Post op course c/b Klebsiella bacteremia (5/15), resp failure requiring intubation on 5/18, Mucus plugging s/p Bronch 5/19 and PEA arrest. Post operatively pt also found to have hemorrhagic pancreatitis with venu-pancreatic abscess possibly 2* to Depakote therefore s/p IR aspiration of peripancreatic fluid collection and paracentesis on 5/22, IR venu-pancreatic abscess drainage and placement of drainage catheter on 5/24. Pt then transferred from CTICU to SICU for septic shock, and further mgmt of hemorrhagic pancreatitis on 5/25. s/p IR placement of 2 new drainage catheters and catheter exchange on 5/26. LUQ drainage 5/30. OR 5/31 for exlap washout & replacement of 3 new JPs and abthera vac with open abdomen. RTOR 6/1, no bleeding, evac of old blood, placement of feeding J-tube, failed extubation due to PNA--now completed ABX course ans s/p trach 6/5 and decannulation 6/28. SICU consulted for worsening leukocytosis, fevers, and respiratory distress. Likely R side aspiration pneumonia given sequence of events and CXR and POCUS shows consolidation.    NEURO: Pain: Tylenol, weaned to OxyIR 10q6h standing w Dilaudid breakthrough, continue wean as tolerated. Hx seizures: cont vimpat. Avoid depakote due to drug-related pancreatitis risk.   HEENT: Artificial tears, Torticollis improved  CV: s/p PEA arrest on 5/24 night. TTE (6/14) LVEF 75%, LVH, biatrial enlargement and elevated PA pressure (elevated from previous), mild to mod MR/TR . Cont ASA 81mg. [id number]. Hold diuresis today.  PULM: Prior ARDS/PNA resolved and trach decannulated 6/28. Acute hypoxic respiratory failure, secondary to R side aspiration PNA, starting broad spectrum ABX, Duonebs, 3% saline nebs. Monitor respiratory status in SICU, hold off on intubation, stable for CT today.  GI/FEN: TF Vital 1.0 at 70cc via feeding J-tube with imodium 2mg TID, MTV, Protonix. Hemorrhagic pancreatitis: s/p multiple drain placements and paracentisis by IR team. Cdiff negative (6/19).  : Voids. Recent acute renal failure--now off CVVHD with renal recovery.   HEME: Acute blood loss anemia: now hg stable.   ENDO: mISS  ID: Sepsis without shock, likely pneumonia: Start Meropenem and Vanc per ID, pending Sputum Cx, CT C/A/P pending final read.   - LAST ABX Caspo (6/18-20), Erta (6/18-23), vanc (6/18-21).   - PRIOR ABX: Ertapenem (6/13-6/16), meropenem (6/9-6/13), vanco x 1 (6/9, 6/18-6/22), caspofungin (6/9-6/12, 6/18-6/21), completed Ceftriaxone( 6/5- 6/15) Chloe (5/21-6/5),  Caspo (5/23-5/30), Ampicillin (5/21- 5/27).   - Prior Cultures: Kleb bacteremia from 5/15 & 5/17. PNA w/ bronch cx kleb, enterobacter (zosyn, erta resistant), E faecalis, strep angionosus from 5/19, pancreatic abscess cx pan-sensitive kleb 5/22. PPX: SCDs, SQH  LINES: PIVs // L rad kalpana (5/31-6/15), 5Fr PIV in LUE (6/13-15), Lsubclav HD Cath (5/31-6/12), RIJ TLC (5/22-5/27), RIJ HD cath (5/22-31), R Ax kalpana(5/12-5/31), [facility]J TLC (5/23-6/1), RIJ TLC (6/1-13)   WOUNDS/DRAINS: CODIE removed.   PT: COURT  Dispo: SICU

## 2023-07-02 NOTE — PROGRESS NOTE ADULT - SUBJECTIVE AND OBJECTIVE BOX
INTERVAL HPI/OVERNIGHT EVENTS:    ID reconsulted for antibiotic recommendations    Patient was seen and examined at bedside.  + fevers    ROS:    unable to obtain           ANTIBIOTICS/RELEVANT:    MEDICATIONS  (STANDING):  acetaminophen   Oral Liquid .. 975 milliGRAM(s) Enteral Tube every 6 hours  albuterol/ipratropium for Nebulization 3 milliLiter(s) Nebulizer every 6 hours  artificial  tears Solution 1 Drop(s) Both EYES two times a day  aspirin  chewable 81 milliGRAM(s) Oral daily  dextrose 5% + sodium chloride 0.45%. 1000 milliLiter(s) (80 mL/Hr) IV Continuous <Continuous>  dextrose 5%. 1000 milliLiter(s) (100 mL/Hr) IV Continuous <Continuous>  dextrose 50% Injectable 25 Gram(s) IV Push once  glucagon  Injectable 1 milliGRAM(s) IntraMuscular once  heparin   Injectable 5000 Unit(s) SubCutaneous every 8 hours  insulin lispro (ADMELOG) corrective regimen sliding scale   SubCutaneous every 6 hours  lacosamide Solution 200 milliGRAM(s) Oral two times a day  loperamide Liquid 2 milliGRAM(s) Enteral Tube every 8 hours  metoprolol succinate  milliGRAM(s) Oral daily  multivitamin/minerals/iron Oral Solution (CENTRUM) 15 milliLiter(s) Oral daily  oxyCODONE    Solution 10 milliGRAM(s) Oral every 6 hours  pantoprazole  Injectable 40 milliGRAM(s) IV Push daily  sodium chloride 3%  Inhalation 4 milliLiter(s) Inhalation every 6 hours    MEDICATIONS  (PRN):  dextrose Oral Gel 15 Gram(s) Oral once PRN Blood Glucose LESS THAN 70 milliGRAM(s)/deciliter  HYDROmorphone  Injectable 0.5 milliGRAM(s) IV Push every 4 hours PRN breakthrough pain  ondansetron Injectable 4 milliGRAM(s) IV Push every 8 hours PRN Nausea and/or Vomiting  sodium chloride 0.9% lock flush 10 milliLiter(s) IV Push every 1 hour PRN Pre/post blood products, medications, blood draw, and to maintain line patency        Vital Signs Last 24 Hrs  T(C): 38.9 (02 Jul 2023 14:50), Max: 39.3 (02 Jul 2023 14:00)  T(F): 102 (02 Jul 2023 14:50), Max: 102.7 (02 Jul 2023 14:00)  HR: 113 (02 Jul 2023 14:39) (106 - 122)  BP: 152/92 (02 Jul 2023 14:39) (131/89 - 152/92)  BP(mean): 116 (02 Jul 2023 14:39) (104 - 118)  RR: 20 (02 Jul 2023 14:39) (17 - 21)  SpO2: 99% (02 Jul 2023 14:39) (85% - 99%)    Parameters below as of 02 Jul 2023 14:39  Patient On (Oxygen Delivery Method): mask, nonrebreather        PHYSICAL EXAM:  Constitutional: chronically ill appearing  Eyes:IVAN, EOMI  Ear/Nose/Throat: no oral lesion, no sinus tenderness on percussion	  Neck:  supple  Respiratory: tachypneic, + rhonchi  Cardiovascular: S1S2 RRR, no murmurs  Gastrointestinal:soft, (+) BS, no HSM  Extremities:no e/e/c  Vascular: DP Pulse:	right normal; left normal      LABS:                        7.9    27.14 )-----------( 461      ( 02 Jul 2023 10:18 )             25.7     07-02    135  |  107  |  8   ----------------------------<  100<H>  4.2   |  20<L>  |  0.53    Ca    8.0<L>      02 Jul 2023 10:18  Phos  3.6     07-02  Mg     1.8     07-02    TPro  6.2  /  Alb  2.3<L>  /  TBili  0.5  /  DBili  x   /  AST  15  /  ALT  10  /  AlkPhos  166<H>  07-02      Urinalysis Basic - ( 02 Jul 2023 10:18 )    Color: x / Appearance: x / SG: x / pH: x  Gluc: 100 mg/dL / Ketone: x  / Bili: x / Urobili: x   Blood: x / Protein: x / Nitrite: x   Leuk Esterase: x / RBC: x / WBC x   Sq Epi: x / Non Sq Epi: x / Bacteria: x        MICROBIOLOGY:    Culture - Blood (07.01.23 @ 18:38)    Specimen Source: .Blood Blood-Venous   Culture Results:   No growth at 12 hours    Culture - Sputum . (06.17.23 @ 21:51)    Gram Stain:   Rare epithelial cells  Few WBC's  Few Yeast  Rare Gram Positive Rods   Specimen Source: Trach Asp Tracheal Aspirate   Culture Results:   Normal Respiratory Loni present        RADIOLOGY & ADDITIONAL STUDIES:    < from: Xray Chest 1 View AP/PA (07.02.23 @ 06:40) >  IMPRESSION: Progression congestion and/or infiltrates. Bilateral effusions    < end of copied text >      < from: CT Angio Abdomen and Pelvis w/ IV Cont (06.30.23 @ 11:46) >  IMPRESSION:  1.  New punctate free intraperitoneal air as described above; possibly   due to intra-abdominal tube removal, however, cannot totally exclude   perforated viscus.  2.  Multiple pancreatic parenchymal collections which are more clearly   defined and slightly increased in size compared to the previous study.   These of possible pseudocysts.  3.  Partially visualized moderate bilateral pleural effusions with near   complete atelectasis of the lower lobes bilaterally.  4.  Periportal edema, increased  5.  Diffuse gastric wall thickening, particularly in the fundus.  6.  Known stable aortic dissection    < end of copied text >

## 2023-07-02 NOTE — CONSULT NOTE ADULT - NS ATTEND AMEND GEN_ALL_CORE FT
S/P AVR with 2 stage TEVAR, severe pancreatitis with Klebsiella peritonitis, bacteremia, pna now with recurrent sepsis and RUL pna  physical as above  vanco and meropenem empirically  monitor in ICU  supplemental oxygen with HFNC  NPO for now, continue J tube feeds  continue vimpat  decision making of high compelxity
Type A dissection, CAD with venu-TEVAR/AVR/CABG severe pancreatitis with shock, Klebsiella bacteremia, ARDS, ATN  physical as above  continue to taper vasopressin, dobutamine  more hypoxic today possibly from higher abdominal pressure; to have LVP  continue NO  check methemoglobin levels  continue CVVH  increase fentanyl, continue versed  follow CK on nimbex and will try to DC this when possible  continue meropenem/ampicillin/empiric caspofungin  TPN  SICU and hepatobiliary surgery will assume care from CTS service

## 2023-07-02 NOTE — PROGRESS NOTE ADULT - SUBJECTIVE AND OBJECTIVE BOX
Overnight events: 500cc bolus ordered. rectal temp 102. ice packs given. x2 blood cultures drawn. dilaudid x1 for abd pain, mild RLE pitting edema, , EKG sinus tachy. became SOB around 5am and desating on RA to 87%. placed on 4L NC with improvement to 95%. duonebs being given. STAT CXR ordered.     SUBJECTIVE:  Patient seen at bedside with chief resident. Patient denies any nausea or vomiting. He reports +BM and +F.     MEDICATIONS  (STANDING):  acetaminophen   Oral Liquid .. 975 milliGRAM(s) Enteral Tube every 6 hours  albuterol/ipratropium for Nebulization 3 milliLiter(s) Nebulizer every 6 hours  artificial  tears Solution 1 Drop(s) Both EYES two times a day  aspirin  chewable 81 milliGRAM(s) Oral daily  dextrose 5% + sodium chloride 0.45%. 1000 milliLiter(s) (80 mL/Hr) IV Continuous <Continuous>  dextrose 5%. 1000 milliLiter(s) (100 mL/Hr) IV Continuous <Continuous>  dextrose 50% Injectable 25 Gram(s) IV Push once  glucagon  Injectable 1 milliGRAM(s) IntraMuscular once  heparin   Injectable 5000 Unit(s) SubCutaneous every 8 hours  insulin lispro (ADMELOG) corrective regimen sliding scale   SubCutaneous every 6 hours  lacosamide Solution 200 milliGRAM(s) Oral two times a day  loperamide Liquid 2 milliGRAM(s) Enteral Tube every 8 hours  metoprolol succinate  milliGRAM(s) Oral daily  multivitamin/minerals/iron Oral Solution (CENTRUM) 15 milliLiter(s) Oral daily  oxyCODONE    Solution 10 milliGRAM(s) Oral every 6 hours  pantoprazole  Injectable 40 milliGRAM(s) IV Push daily  sodium chloride 3%  Inhalation 4 milliLiter(s) Inhalation every 6 hours    MEDICATIONS  (PRN):  dextrose Oral Gel 15 Gram(s) Oral once PRN Blood Glucose LESS THAN 70 milliGRAM(s)/deciliter  HYDROmorphone  Injectable 0.5 milliGRAM(s) IV Push every 4 hours PRN breakthrough pain  ondansetron Injectable 4 milliGRAM(s) IV Push every 8 hours PRN Nausea and/or Vomiting  sodium chloride 0.9% lock flush 10 milliLiter(s) IV Push every 1 hour PRN Pre/post blood products, medications, blood draw, and to maintain line patency      Vital Signs Last 24 Hrs  T(C): 37.6 (2023 04:56), Max: 38.9 (2023 18:39)  T(F): 99.6 (2023 04:56), Max: 102 (2023 18:39)  HR: 112 (2023 03:50) (98 - 112)  BP: 151/95 (2023 03:50) (133/80 - 152/92)  BP(mean): 117 (2023 03:50) (102 - 118)  RR: 18 (2023 03:50) (17 - 20)  SpO2: 96% (2023 03:50) (94% - 98%)    Parameters below as of 2023 03:50  Patient On (Oxygen Delivery Method): room air        Physical Exam:  General: NAD, resting comfortably in bed  Pulmonary: Nonlabored breathing, no respiratory distress  Cardiovascular: NSR  Abdominal: soft, NT/ND, J tube in place  Extremities: WWP, normal strength  Neuro: A/O x 3, CNs II-XII grossly intact, no focal deficits, normal motor/sensation  Pulses: palpable distal pulses    I&O's Summary    2023 07:01  -  2023 07:00  --------------------------------------------------------  IN: 3960 mL / OUT: 1150 mL / NET: 2810 mL        LABS:                        8.0    26.21 )-----------( 502      ( 2023 05:30 )             25.1     07-01    136  |  106  |  10  ----------------------------<  86  4.3   |  20<L>  |  0.58    Ca    8.1<L>      2023 05:30  Phos  4.0     07-01  Mg     2.0     07-01    TPro  6.3  /  Alb  2.3<L>  /  TBili  0.5  /  DBili  x   /  AST  15  /  ALT  11  /  AlkPhos  184<H>  07-      Urinalysis Basic - ( 2023 10:05 )    Color: Yellow / Appearance: Clear / S.020 / pH: x  Gluc: x / Ketone: NEGATIVE  / Bili: Negative / Urobili: 0.2 E.U./dL   Blood: x / Protein: 30 mg/dL / Nitrite: POSITIVE   Leuk Esterase: NEGATIVE / RBC: < 5 /HPF / WBC < 5 /HPF   Sq Epi: x / Non Sq Epi: x / Bacteria: Many /HPF      CAPILLARY BLOOD GLUCOSE      POCT Blood Glucose.: 97 mg/dL (2023 05:41)  POCT Blood Glucose.: 121 mg/dL (2023 00:35)  POCT Blood Glucose.: 109 mg/dL (2023 17:13)  POCT Blood Glucose.: 109 mg/dL (2023 11:50)    LIVER FUNCTIONS - ( 2023 05:30 )  Alb: 2.3 g/dL / Pro: 6.3 g/dL / ALK PHOS: 184 U/L / ALT: 11 U/L / AST: 15 U/L / GGT: x             RADIOLOGY & ADDITIONAL STUDIES:

## 2023-07-02 NOTE — PROGRESS NOTE ADULT - ASSESSMENT
53M w PMHx of HTN, type A aortic dissection s/p Dacron grafts, AV resuspension in 2013, CAD s/p CABG x 1 SVG to RCA (5/2013 with Dr. Medina), seizure disorder, recent admission 3/20-4/14 for replacement of transverse aortic arch, second stage TEVAR and aorto-axillary bypass, AV replacement (bio 23mm), and CABG x 1 (SVG-RCA). Pt found to have endo leak and taken to OR for TEVAR revision on 5/5. Post op course c/b Klebsiella bacteremia (5/15), resp failure requiring intubation on 5/18, Mucus plugging s/p Bronch 5/19 and PEA arrest. Post operatively pt also found to have hemorrhagic pancreatitis with venu-pancreatic abscess possibly 2* to Depakote therefore s/p IR aspiration of peripancreatic fluid collection and paracentesis on 5/22, IR venu-pancreatic abscess drainage and placement of drainage catheter on 5/24. Pt then transferred from CTICU to SICU for septic shock, and further mgmt of hemorrhagic pancreatitis on 5/25. s/p IR placement of 2 new drainage catheters and catheter exchange on 5/26. LUQ drainage 5/30. OR 5/31 for exlap washout & replacement of 3 new JPs and abthera vac with open abdomen. RTOR 6/1, no bleeding, evac of old blood, placement of feeding J-tube. failed extubation s/p trach 6/5 with pneumonia.    NPO w/ tube feeds, IVF  F/u BCx  Trach decannulated 6/28  metoprolol QD  ASA/SQH  ISS  oxycodone 10mg Q6H standing 53M w PMHx of HTN, type A aortic dissection s/p Dacron grafts, AV resuspension in 2013, CAD s/p CABG x 1 SVG to RCA (5/2013 with Dr. Medina), seizure disorder, recent admission 3/20-4/14 for replacement of transverse aortic arch, second stage TEVAR and aorto-axillary bypass, AV replacement (bio 23mm), and CABG x 1 (SVG-RCA). Pt found to have endo leak and taken to OR for TEVAR revision on 5/5. Post op course c/b Klebsiella bacteremia (5/15), resp failure requiring intubation on 5/18, Mucus plugging s/p Bronch 5/19 and PEA arrest. Post operatively pt also found to have hemorrhagic pancreatitis with venu-pancreatic abscess possibly 2* to Depakote therefore s/p IR aspiration of peripancreatic fluid collection and paracentesis on 5/22, IR venu-pancreatic abscess drainage and placement of drainage catheter on 5/24. Pt then transferred from CTICU to SICU for septic shock, and further mgmt of hemorrhagic pancreatitis on 5/25. s/p IR placement of 2 new drainage catheters and catheter exchange on 5/26. LUQ drainage 5/30. OR 5/31 for exlap washout & replacement of 3 new JPs and abthera vac with open abdomen. RTOR 6/1, no bleeding, evac of old blood, placement of feeding J-tube. failed extubation s/p trach 6/5 with pneumonia.    NPO w/ tube feeds, IVF  F/u BCx  Trach decannulated 6/28  metoprolol QD  ASA/SQH  ISS  oxycodone 10mg Q6H standing  f/u CT Chest, Abd and Pel with PO and IV contrast

## 2023-07-02 NOTE — PROGRESS NOTE ADULT - ASSESSMENT
IMPRESSION:  Worsening fever and leukocytosis in the setting of worsening respiratory status and worsening infiltrates on CXR. Clinical picture suggests health care associated pneumonia     Recommend:  1.  Follow up blood cultures from 7/1  2.  Check repeat tracheal aspirate culture  3.  Agree with CT chest/abdomen with IV contrast  4.  Can start Vancomycin 1500 mg IV q12 + Meropenem 1 gram IV q8hrs    ID team 2 will follow

## 2023-07-02 NOTE — CONSULT NOTE ADULT - SUBJECTIVE AND OBJECTIVE BOX
Reason for Consult: Respiratory Distress    HPI:  53M PMHx of HTN, type A aortic dissection s/p Dacron grafts, AV resuspension in 2013, CAD s/p CABG x 1 SVG to RCA (5/2013 with Dr. Medina), seizure disorder, recent admission 3/20-4/14 for replacement of transverse aortic arch, second stage TEVAR and aorto-axillary bypass, AV replacement (bio 23mm), and CABG x 1 (SVG-RCA). Pt found to have endo leak and taken to OR for TEVAR revision on 5/5. Post op course c/b Klebsiella bacteremia (5/15), resp failure requiring intubation on 5/18, Mucus plugging s/p Bronch 5/19 and PEA arrest. Post operatively pt also found to have hemorrhagic pancreatitis with venu-pancreatic abscess possibly 2* to Depakote therefore s/p IR aspiration of peripancreatic fluid collection and paracentesis on 5/22, IR venu-pancreatic abscess drainage and placement of drainage catheter on 5/24. Pt then transferred from CTICU to SICU for septic shock, and further mgmt of hemorrhagic pancreatitis on (5/25). s/p IR placement of 2 new drainage catheters and catheter exchange on (5/26), LUQ drainage (5/30), s/p exlap washout & replacement of 3 new JPs and abthera vac with open abdomen (5/31). RTOR for evacuation of old blood, placement of feeding J-tube (6/1). Course complicated by failed extubation s/p trach 6/5 and pneumonia.     Pt was stepped down from SICU 6/23 after completing ABX for leukocytosis/PNA (Ertapenem, Caspofungin, Vanc last doses around 6/22). He was improving clinically, so trach was decannulated on 6/28. He was advanced to soft diet on 6/28 as well, but had nausea/emesis--so was made NPO again. On 6/29, his leukocytosis worsened and WBC now up 27.      PMH:  PSH:  Meds:  Allerg:  SH:  FH:    PAST MEDICAL & SURGICAL HISTORY:  HTN (hypertension)      Aortic dissection      CAD (coronary artery disease)      Seizure disorder      Seizure disorder      Hypertension      Status post endovascular aneurysm repair (EVAR)      S/P aortic bifurcation bypass graft      S/P CABG x 1          MEDICATIONS  (STANDING):  acetaminophen   Oral Liquid .. 975 milliGRAM(s) Enteral Tube every 6 hours  albuterol/ipratropium for Nebulization 3 milliLiter(s) Nebulizer every 6 hours  artificial  tears Solution 1 Drop(s) Both EYES two times a day  aspirin  chewable 81 milliGRAM(s) Oral daily  dextrose 5% + sodium chloride 0.45%. 1000 milliLiter(s) (80 mL/Hr) IV Continuous <Continuous>  dextrose 5%. 1000 milliLiter(s) (100 mL/Hr) IV Continuous <Continuous>  dextrose 50% Injectable 25 Gram(s) IV Push once  glucagon  Injectable 1 milliGRAM(s) IntraMuscular once  heparin   Injectable 5000 Unit(s) SubCutaneous every 8 hours  insulin lispro (ADMELOG) corrective regimen sliding scale   SubCutaneous every 6 hours  lacosamide Solution 200 milliGRAM(s) Oral two times a day  loperamide Liquid 2 milliGRAM(s) Enteral Tube every 8 hours  metoprolol succinate  milliGRAM(s) Oral daily  multivitamin/minerals/iron Oral Solution (CENTRUM) 15 milliLiter(s) Oral daily  oxyCODONE    Solution 10 milliGRAM(s) Oral every 6 hours  pantoprazole  Injectable 40 milliGRAM(s) IV Push daily  sodium chloride 3%  Inhalation 4 milliLiter(s) Inhalation every 6 hours    MEDICATIONS  (PRN):  dextrose Oral Gel 15 Gram(s) Oral once PRN Blood Glucose LESS THAN 70 milliGRAM(s)/deciliter  HYDROmorphone  Injectable 0.5 milliGRAM(s) IV Push every 4 hours PRN breakthrough pain  ondansetron Injectable 4 milliGRAM(s) IV Push every 8 hours PRN Nausea and/or Vomiting  sodium chloride 0.9% lock flush 10 milliLiter(s) IV Push every 1 hour PRN Pre/post blood products, medications, blood draw, and to maintain line patency      Allergies    No Known Allergies    Intolerances        SOCIAL HISTORY:    FAMILY HISTORY:  No pertinent family history in first degree relatives        REVIEW OF SYSTEMS      General:	    Skin/Breast:  	  Ophthalmologic:  	  ENMT:	    Respiratory and Thorax:  	  Cardiovascular:	    Gastrointestinal:	    Genitourinary:	    Musculoskeletal:	    Neurological:	    Psychiatric:	    Hematology/Lymphatics:	    Endocrine:	    Allergic/Immunologic:	    ICU Vital Signs Last 24 Hrs  T(C): 38.9 (02 Jul 2023 14:50), Max: 39.3 (02 Jul 2023 14:00)  T(F): 102 (02 Jul 2023 14:50), Max: 102.7 (02 Jul 2023 14:00)  HR: 113 (02 Jul 2023 14:39) (102 - 122)  BP: 152/92 (02 Jul 2023 14:39) (131/89 - 152/92)  BP(mean): 116 (02 Jul 2023 14:39) (104 - 118)  ABP: --  ABP(mean): --  RR: 20 (02 Jul 2023 14:39) (17 - 21)  SpO2: 99% (02 Jul 2023 14:39) (85% - 99%)    O2 Parameters below as of 02 Jul 2023 14:39  Patient On (Oxygen Delivery Method): mask, nonrebreather            I&O's Summary    01 Jul 2023 07:01  -  02 Jul 2023 07:00  --------------------------------------------------------  IN: 3960 mL / OUT: 1150 mL / NET: 2810 mL    02 Jul 2023 07:01  -  02 Jul 2023 15:50  --------------------------------------------------------  IN: 675 mL / OUT: 350 mL / NET: 325 mL        Cantrell:	  [ ] None	[ ] Daily Cantrell Order Placed	   Indication:	  [ ] Strict I and O's    [ ] Obstruction     [ ] Incontinence + Stage 3 or 4 Decubitus  Central Line:  [ ] None	   [ ]  Medication / TPN Administration       Physical Exam:  General: NAD, AxOx3, no focal deficits  HEENT: NC/AT, normal hearing, no oral lesions, neck supple w/o LAD, MMM  Pulmonary: Nonlabored breathing, no respiratory distress, CTA-B  Cardiovascular: NSR, no murmurs  Abdominal: soft, NT/ND, +BS, no organomegaly  Extremities: WWP, 5/5 strength x 4, no clubbing/cyanosis/edema  Pulses: Radial 2+, DP/PT2+    Lines/tubes/drains:    Vent settings:      LABS:                        7.9    27.14 )-----------( 461      ( 02 Jul 2023 10:18 )             25.7     07-02    135  |  107  |  8   ----------------------------<  100<H>  4.2   |  20<L>  |  0.53    Ca    8.0<L>      02 Jul 2023 10:18  Phos  3.6     07-02  Mg     1.8     07-02    TPro  6.2  /  Alb  2.3<L>  /  TBili  0.5  /  DBili  x   /  AST  15  /  ALT  10  /  AlkPhos  166<H>  07-02      Urinalysis Basic - ( 02 Jul 2023 10:18 )    Color: x / Appearance: x / SG: x / pH: x  Gluc: 100 mg/dL / Ketone: x  / Bili: x / Urobili: x   Blood: x / Protein: x / Nitrite: x   Leuk Esterase: x / RBC: x / WBC x   Sq Epi: x / Non Sq Epi: x / Bacteria: x      CAPILLARY BLOOD GLUCOSE      POCT Blood Glucose.: 87 mg/dL (02 Jul 2023 11:36)  POCT Blood Glucose.: 97 mg/dL (02 Jul 2023 05:41)  POCT Blood Glucose.: 121 mg/dL (02 Jul 2023 00:35)  POCT Blood Glucose.: 109 mg/dL (01 Jul 2023 17:13)    LIVER FUNCTIONS - ( 02 Jul 2023 08:25 )  Alb: 2.3 g/dL / Pro: 6.2 g/dL / ALK PHOS: 166 U/L / ALT: 10 U/L / AST: 15 U/L / GGT: x             Cultures:  Culture Results:   No growth at 12 hours (07-01 @ 18:38)  Culture Results:   No growth at 12 hours (07-01 @ 18:38)      RADIOLOGY & ADDITIONAL STUDIES:     Reason for Consult: Respiratory Distress    HPI:  53M PMHx of HTN, type A aortic dissection s/p Dacron grafts, AV resuspension in 2013, CAD s/p CABG x 1 SVG to RCA (5/2013 with Dr. Medina), seizure disorder, recent admission 3/20-4/14 for replacement of transverse aortic arch, second stage TEVAR and aorto-axillary bypass, AV replacement (bio 23mm), and CABG x 1 (SVG-RCA). Pt found to have endo leak and taken to OR for TEVAR revision on 5/5. Post op course c/b Klebsiella bacteremia (5/15), resp failure requiring intubation on 5/18, Mucus plugging s/p Bronch 5/19 and PEA arrest. Post operatively pt also found to have hemorrhagic pancreatitis with venu-pancreatic abscess possibly 2* to Depakote therefore s/p IR aspiration of peripancreatic fluid collection and paracentesis on 5/22, IR venu-pancreatic abscess drainage and placement of drainage catheter on 5/24. Pt then transferred from CTICU to SICU for septic shock, and further mgmt of hemorrhagic pancreatitis on (5/25). s/p IR placement of 2 new drainage catheters and catheter exchange on (5/26), LUQ drainage (5/30), s/p exlap washout & replacement of 3 new JPs and abthera vac with open abdomen (5/31). RTOR for evacuation of old blood, placement of feeding J-tube (6/1). Course complicated by failed extubation s/p trach 6/5 and pneumonia.     Pt was stepped down from SICU 6/23 after completing ABX for leukocytosis/PNA (Ertapenem, Caspofungin, Vanc last doses around 6/22). He was improving clinically, so trach was decannulated on 6/28. He was advanced to soft diet on 6/28 as well, but had nausea/emesis--so was made NPO again. On 6/29, his leukocytosis worsened and WBC now up 27. This     PAST MEDICAL & SURGICAL HISTORY:  HTN (hypertension)      Aortic dissection      CAD (coronary artery disease)      Seizure disorder      Seizure disorder      Hypertension      Status post endovascular aneurysm repair (EVAR)      S/P aortic bifurcation bypass graft      S/P CABG x 1          MEDICATIONS  (STANDING):  acetaminophen   Oral Liquid .. 975 milliGRAM(s) Enteral Tube every 6 hours  albuterol/ipratropium for Nebulization 3 milliLiter(s) Nebulizer every 6 hours  artificial  tears Solution 1 Drop(s) Both EYES two times a day  aspirin  chewable 81 milliGRAM(s) Oral daily  dextrose 5% + sodium chloride 0.45%. 1000 milliLiter(s) (80 mL/Hr) IV Continuous <Continuous>  dextrose 5%. 1000 milliLiter(s) (100 mL/Hr) IV Continuous <Continuous>  dextrose 50% Injectable 25 Gram(s) IV Push once  glucagon  Injectable 1 milliGRAM(s) IntraMuscular once  heparin   Injectable 5000 Unit(s) SubCutaneous every 8 hours  insulin lispro (ADMELOG) corrective regimen sliding scale   SubCutaneous every 6 hours  lacosamide Solution 200 milliGRAM(s) Oral two times a day  loperamide Liquid 2 milliGRAM(s) Enteral Tube every 8 hours  metoprolol succinate  milliGRAM(s) Oral daily  multivitamin/minerals/iron Oral Solution (CENTRUM) 15 milliLiter(s) Oral daily  oxyCODONE    Solution 10 milliGRAM(s) Oral every 6 hours  pantoprazole  Injectable 40 milliGRAM(s) IV Push daily  sodium chloride 3%  Inhalation 4 milliLiter(s) Inhalation every 6 hours    MEDICATIONS  (PRN):  dextrose Oral Gel 15 Gram(s) Oral once PRN Blood Glucose LESS THAN 70 milliGRAM(s)/deciliter  HYDROmorphone  Injectable 0.5 milliGRAM(s) IV Push every 4 hours PRN breakthrough pain  ondansetron Injectable 4 milliGRAM(s) IV Push every 8 hours PRN Nausea and/or Vomiting  sodium chloride 0.9% lock flush 10 milliLiter(s) IV Push every 1 hour PRN Pre/post blood products, medications, blood draw, and to maintain line patency      Allergies    No Known Allergies    Intolerances        SOCIAL HISTORY:    FAMILY HISTORY:  No pertinent family history in first degree relatives        REVIEW OF SYSTEMS      General:	    Skin/Breast:  	  Ophthalmologic:  	  ENMT:	    Respiratory and Thorax:  	  Cardiovascular:	    Gastrointestinal:	    Genitourinary:	    Musculoskeletal:	    Neurological:	    Psychiatric:	    Hematology/Lymphatics:	    Endocrine:	    Allergic/Immunologic:	    ICU Vital Signs Last 24 Hrs  T(C): 38.9 (02 Jul 2023 14:50), Max: 39.3 (02 Jul 2023 14:00)  T(F): 102 (02 Jul 2023 14:50), Max: 102.7 (02 Jul 2023 14:00)  HR: 113 (02 Jul 2023 14:39) (102 - 122)  BP: 152/92 (02 Jul 2023 14:39) (131/89 - 152/92)  BP(mean): 116 (02 Jul 2023 14:39) (104 - 118)  ABP: --  ABP(mean): --  RR: 20 (02 Jul 2023 14:39) (17 - 21)  SpO2: 99% (02 Jul 2023 14:39) (85% - 99%)    O2 Parameters below as of 02 Jul 2023 14:39  Patient On (Oxygen Delivery Method): mask, nonrebreather            I&O's Summary    01 Jul 2023 07:01  -  02 Jul 2023 07:00  --------------------------------------------------------  IN: 3960 mL / OUT: 1150 mL / NET: 2810 mL    02 Jul 2023 07:01  -  02 Jul 2023 15:50  --------------------------------------------------------  IN: 675 mL / OUT: 350 mL / NET: 325 mL        Cantrell:	  [ ] None	[ ] Daily Cantrell Order Placed	   Indication:	  [ ] Strict I and O's    [ ] Obstruction     [ ] Incontinence + Stage 3 or 4 Decubitus  Central Line:  [ ] None	   [ ]  Medication / TPN Administration       Physical Exam:  General: NAD, AxOx3, no focal deficits  HEENT: NC/AT, normal hearing, no oral lesions, neck supple w/o LAD, MMM  Pulmonary: Nonlabored breathing, no respiratory distress, CTA-B  Cardiovascular: NSR, no murmurs  Abdominal: soft, NT/ND, +BS, no organomegaly  Extremities: WWP, 5/5 strength x 4, no clubbing/cyanosis/edema  Pulses: Radial 2+, DP/PT2+    Lines/tubes/drains:    Vent settings:      LABS:                        7.9    27.14 )-----------( 461      ( 02 Jul 2023 10:18 )             25.7     07-02    135  |  107  |  8   ----------------------------<  100<H>  4.2   |  20<L>  |  0.53    Ca    8.0<L>      02 Jul 2023 10:18  Phos  3.6     07-02  Mg     1.8     07-02    TPro  6.2  /  Alb  2.3<L>  /  TBili  0.5  /  DBili  x   /  AST  15  /  ALT  10  /  AlkPhos  166<H>  07-02      Urinalysis Basic - ( 02 Jul 2023 10:18 )    Color: x / Appearance: x / SG: x / pH: x  Gluc: 100 mg/dL / Ketone: x  / Bili: x / Urobili: x   Blood: x / Protein: x / Nitrite: x   Leuk Esterase: x / RBC: x / WBC x   Sq Epi: x / Non Sq Epi: x / Bacteria: x      CAPILLARY BLOOD GLUCOSE      POCT Blood Glucose.: 87 mg/dL (02 Jul 2023 11:36)  POCT Blood Glucose.: 97 mg/dL (02 Jul 2023 05:41)  POCT Blood Glucose.: 121 mg/dL (02 Jul 2023 00:35)  POCT Blood Glucose.: 109 mg/dL (01 Jul 2023 17:13)    LIVER FUNCTIONS - ( 02 Jul 2023 08:25 )  Alb: 2.3 g/dL / Pro: 6.2 g/dL / ALK PHOS: 166 U/L / ALT: 10 U/L / AST: 15 U/L / GGT: x             Cultures:  Culture Results:   No growth at 12 hours (07-01 @ 18:38)  Culture Results:   No growth at 12 hours (07-01 @ 18:38)      RADIOLOGY & ADDITIONAL STUDIES:     Reason for Consult: Respiratory Distress    HPI:  53M PMHx of HTN, type A aortic dissection s/p Dacron grafts, AV resuspension in 2013, CAD s/p CABG x 1 SVG to RCA (5/2013 with Dr. Medina), seizure disorder, recent admission 3/20-4/14 for replacement of transverse aortic arch, second stage TEVAR and aorto-axillary bypass, AV replacement (bio 23mm), and CABG x 1 (SVG-RCA). Pt found to have endo leak and taken to OR for TEVAR revision on 5/5. Post op course c/b Klebsiella bacteremia (5/15), resp failure requiring intubation on 5/18, Mucus plugging s/p Bronch 5/19 and PEA arrest. Post operatively pt also found to have hemorrhagic pancreatitis with venu-pancreatic abscess possibly 2* to Depakote therefore s/p IR aspiration of peripancreatic fluid collection and paracentesis on 5/22, IR venu-pancreatic abscess drainage and placement of drainage catheter on 5/24. Pt then transferred from CTICU to SICU for septic shock, and further mgmt of hemorrhagic pancreatitis on (5/25). s/p IR placement of 2 new drainage catheters and catheter exchange on (5/26), LUQ drainage (5/30), s/p exlap washout & replacement of 3 new JPs and abthera vac with open abdomen (5/31). RTOR for evacuation of old blood, placement of feeding J-tube (6/1). Course complicated by failed extubation s/p trach 6/5 and pneumonia.     Pt was stepped down from SICU 6/23 after completing ABX for leukocytosis/PNA (Ertapenem, Caspofungin, Vanc last doses around 6/22). He was improving clinically, so trach was decannulated on 6/28. He was advanced to soft diet on 6/28 as well, but had nausea/emesis--so was made NPO again. On 6/29, his leukocytosis worsened and WBC now up 27 today. Today, pt was noted to be hypoxic and required NRB. SICU consulted for worsening respiratory distress.      Pt seen in room with team and intensivist. Denies SOB or CP. Does report N/V occaisonally. Abd pain intermittent but currently controlled. No issues voiding. Having BMs--still loose.     PAST MEDICAL & SURGICAL HISTORY:  HTN (hypertension)  Aortic dissection  CAD (coronary artery disease)  Seizure disorder  Seizure disorder  Hypertension  Status post endovascular aneurysm repair (EVAR)  S/P aortic bifurcation bypass graft  S/P CABG x 1    MEDICATIONS  (STANDING):  acetaminophen   Oral Liquid .. 975 milliGRAM(s) Enteral Tube every 6 hours  albuterol/ipratropium for Nebulization 3 milliLiter(s) Nebulizer every 6 hours  artificial  tears Solution 1 Drop(s) Both EYES two times a day  aspirin  chewable 81 milliGRAM(s) Oral daily  dextrose 5% + sodium chloride 0.45%. 1000 milliLiter(s) (80 mL/Hr) IV Continuous <Continuous>  dextrose 5%. 1000 milliLiter(s) (100 mL/Hr) IV Continuous <Continuous>  dextrose 50% Injectable 25 Gram(s) IV Push once  glucagon  Injectable 1 milliGRAM(s) IntraMuscular once  heparin   Injectable 5000 Unit(s) SubCutaneous every 8 hours  insulin lispro (ADMELOG) corrective regimen sliding scale   SubCutaneous every 6 hours  lacosamide Solution 200 milliGRAM(s) Oral two times a day  loperamide Liquid 2 milliGRAM(s) Enteral Tube every 8 hours  metoprolol succinate  milliGRAM(s) Oral daily  multivitamin/minerals/iron Oral Solution (CENTRUM) 15 milliLiter(s) Oral daily  oxyCODONE    Solution 10 milliGRAM(s) Oral every 6 hours  pantoprazole  Injectable 40 milliGRAM(s) IV Push daily  sodium chloride 3%  Inhalation 4 milliLiter(s) Inhalation every 6 hours    MEDICATIONS  (PRN):  dextrose Oral Gel 15 Gram(s) Oral once PRN Blood Glucose LESS THAN 70 milliGRAM(s)/deciliter  HYDROmorphone  Injectable 0.5 milliGRAM(s) IV Push every 4 hours PRN breakthrough pain  ondansetron Injectable 4 milliGRAM(s) IV Push every 8 hours PRN Nausea and/or Vomiting  sodium chloride 0.9% lock flush 10 milliLiter(s) IV Push every 1 hour PRN Pre/post blood products, medications, blood draw, and to maintain line patency    Allergies  No Known Allergies  Intolerances    ICU Vital Signs Last 24 Hrs  T(C): 38.9 (02 Jul 2023 14:50), Max: 39.3 (02 Jul 2023 14:00)  T(F): 102 (02 Jul 2023 14:50), Max: 102.7 (02 Jul 2023 14:00)  HR: 113 (02 Jul 2023 14:39) (102 - 122)  BP: 152/92 (02 Jul 2023 14:39) (131/89 - 152/92)  BP(mean): 116 (02 Jul 2023 14:39) (104 - 118)  ABP: --  ABP(mean): --  RR: 20 (02 Jul 2023 14:39) (17 - 21)  SpO2: 99% (02 Jul 2023 14:39) (85% - 99%)    O2 Parameters below as of 02 Jul 2023 14:39  Patient On (Oxygen Delivery Method): mask, nonrebreather    I&O's Summary    01 Jul 2023 07:01  -  02 Jul 2023 07:00  --------------------------------------------------------  IN: 3960 mL / OUT: 1150 mL / NET: 2810 mL    02 Jul 2023 07:01  -  02 Jul 2023 15:50  --------------------------------------------------------  IN: 675 mL / OUT: 350 mL / NET: 325 mL    Cantrell:	  [x] None	[ ] Daily Cantrell Order Placed	   Indication:	  [ ] Strict I and O's    [ ] Obstruction     [ ] Incontinence + Stage 3 or 4 Decubitus  Central Line:  [x] None	   [ ]  Medication / TPN Administration       Physical Exam:  General: NAD, AxOx3, no focal deficits, torticolis improved, follows commands and answers questions appropriately  HEENT: NC/AT, normal hearing, no oral lesions, neck supple w/o LAD, MMM  Pulmonary: Tachypneic to 30s, respiratory distress noted, wet cough, anteriorly lungs clear with decreased sounds on RLL. POCUS focal B lines on R side, predominantly [place], very mild effusion, L side [place], consolidation on R side.  Cardiovascular: NSR  Abdominal: soft, mild TTP distractible, J tube site CDI,   Extremities: WWP, 5/5 strength x 4, pitting edema    LABS:                        7.9    27.14 )-----------( 461      ( 02 Jul 2023 10:18 )             25.7     07-02    135  |  107  |  8   ----------------------------<  100<H>  4.2   |  20<L>  |  0.53    Ca    8.0<L>      02 Jul 2023 10:18  Phos  3.6     07-02  Mg     1.8     07-02    TPro  6.2  /  Alb  2.3<L>  /  TBili  0.5  /  DBili  x   /  AST  15  /  ALT  10  /  AlkPhos  166<H>  07-02    Urinalysis Basic - ( 02 Jul 2023 10:18 )    Color: x / Appearance: x / SG: x / pH: x  Gluc: 100 mg/dL / Ketone: x  / Bili: x / Urobili: x   Blood: x / Protein: x / Nitrite: x   Leuk Esterase: x / RBC: x / WBC x   Sq Epi: x / Non Sq Epi: x / Bacteria: x    CAPILLARY BLOOD GLUCOSE    POCT Blood Glucose.: 87 mg/dL (02 Jul 2023 11:36)  POCT Blood Glucose.: 97 mg/dL (02 Jul 2023 05:41)  POCT Blood Glucose.: 121 mg/dL (02 Jul 2023 00:35)  POCT Blood Glucose.: 109 mg/dL (01 Jul 2023 17:13)    LIVER FUNCTIONS - ( 02 Jul 2023 08:25 )  Alb: 2.3 g/dL / Pro: 6.2 g/dL / ALK PHOS: 166 U/L / ALT: 10 U/L / AST: 15 U/L / GGT: x           Cultures:  Culture Results:   No growth at 12 hours (07-01 @ 18:38)  Culture Results:   No growth at 12 hours (07-01 @ 18:38)

## 2023-07-03 LAB
ANION GAP SERPL CALC-SCNC: 10 MMOL/L — SIGNIFICANT CHANGE UP (ref 5–17)
ANION GAP SERPL CALC-SCNC: 10 MMOL/L — SIGNIFICANT CHANGE UP (ref 5–17)
BASE EXCESS BLDA CALC-SCNC: -5.9 MMOL/L — LOW (ref -2–3)
BASE EXCESS BLDA CALC-SCNC: -7.3 MMOL/L — LOW (ref -2–3)
BLD GP AB SCN SERPL QL: NEGATIVE — SIGNIFICANT CHANGE UP
BUN SERPL-MCNC: 10 MG/DL — SIGNIFICANT CHANGE UP (ref 7–23)
BUN SERPL-MCNC: 11 MG/DL — SIGNIFICANT CHANGE UP (ref 7–23)
CALCIUM SERPL-MCNC: 7.7 MG/DL — LOW (ref 8.4–10.5)
CALCIUM SERPL-MCNC: 7.8 MG/DL — LOW (ref 8.4–10.5)
CHLORIDE SERPL-SCNC: 103 MMOL/L — SIGNIFICANT CHANGE UP (ref 96–108)
CHLORIDE SERPL-SCNC: 104 MMOL/L — SIGNIFICANT CHANGE UP (ref 96–108)
CO2 BLDA-SCNC: 20 MMOL/L — SIGNIFICANT CHANGE UP (ref 19–24)
CO2 BLDA-SCNC: 20 MMOL/L — SIGNIFICANT CHANGE UP (ref 19–24)
CO2 SERPL-SCNC: 18 MMOL/L — LOW (ref 22–31)
CO2 SERPL-SCNC: 19 MMOL/L — LOW (ref 22–31)
CREAT SERPL-MCNC: 0.57 MG/DL — SIGNIFICANT CHANGE UP (ref 0.5–1.3)
CREAT SERPL-MCNC: 0.57 MG/DL — SIGNIFICANT CHANGE UP (ref 0.5–1.3)
EGFR: 116 ML/MIN/1.73M2 — SIGNIFICANT CHANGE UP
EGFR: 116 ML/MIN/1.73M2 — SIGNIFICANT CHANGE UP
GLUCOSE BLDC GLUCOMTR-MCNC: 115 MG/DL — HIGH (ref 70–99)
GLUCOSE BLDC GLUCOMTR-MCNC: 115 MG/DL — HIGH (ref 70–99)
GLUCOSE BLDC GLUCOMTR-MCNC: 98 MG/DL — SIGNIFICANT CHANGE UP (ref 70–99)
GLUCOSE BLDC GLUCOMTR-MCNC: 99 MG/DL — SIGNIFICANT CHANGE UP (ref 70–99)
GLUCOSE SERPL-MCNC: 102 MG/DL — HIGH (ref 70–99)
GLUCOSE SERPL-MCNC: 111 MG/DL — HIGH (ref 70–99)
GRAM STN FLD: SIGNIFICANT CHANGE UP
HCO3 BLDA-SCNC: 19 MMOL/L — LOW (ref 21–28)
HCO3 BLDA-SCNC: 19 MMOL/L — LOW (ref 21–28)
HCT VFR BLD CALC: 25.9 % — LOW (ref 39–50)
HCT VFR BLD CALC: 26.8 % — LOW (ref 39–50)
HGB BLD-MCNC: 8.1 G/DL — LOW (ref 13–17)
HGB BLD-MCNC: 8.4 G/DL — LOW (ref 13–17)
HOROWITZ INDEX BLDA+IHG-RTO: 40 — SIGNIFICANT CHANGE UP
INR BLD: 1.51 — HIGH (ref 0.88–1.16)
LACTATE SERPL-SCNC: 1.2 MMOL/L — SIGNIFICANT CHANGE UP (ref 0.5–2)
MAGNESIUM SERPL-MCNC: 1.9 MG/DL — SIGNIFICANT CHANGE UP (ref 1.6–2.6)
MAGNESIUM SERPL-MCNC: 1.9 MG/DL — SIGNIFICANT CHANGE UP (ref 1.6–2.6)
MCHC RBC-ENTMCNC: 29.3 PG — SIGNIFICANT CHANGE UP (ref 27–34)
MCHC RBC-ENTMCNC: 29.7 PG — SIGNIFICANT CHANGE UP (ref 27–34)
MCHC RBC-ENTMCNC: 31.3 GM/DL — LOW (ref 32–36)
MCHC RBC-ENTMCNC: 31.3 GM/DL — LOW (ref 32–36)
MCV RBC AUTO: 93.4 FL — SIGNIFICANT CHANGE UP (ref 80–100)
MCV RBC AUTO: 94.9 FL — SIGNIFICANT CHANGE UP (ref 80–100)
NRBC # BLD: 0 /100 WBCS — SIGNIFICANT CHANGE UP (ref 0–0)
NRBC # BLD: 0 /100 WBCS — SIGNIFICANT CHANGE UP (ref 0–0)
PCO2 BLDA: 33 MMHG — LOW (ref 35–48)
PCO2 BLDA: 39 MMHG — SIGNIFICANT CHANGE UP (ref 35–48)
PH BLDA: 7.29 — LOW (ref 7.35–7.45)
PH BLDA: 7.36 — SIGNIFICANT CHANGE UP (ref 7.35–7.45)
PHOSPHATE SERPL-MCNC: 3.9 MG/DL — SIGNIFICANT CHANGE UP (ref 2.5–4.5)
PHOSPHATE SERPL-MCNC: 4.5 MG/DL — SIGNIFICANT CHANGE UP (ref 2.5–4.5)
PLATELET # BLD AUTO: 375 K/UL — SIGNIFICANT CHANGE UP (ref 150–400)
PLATELET # BLD AUTO: 492 K/UL — HIGH (ref 150–400)
PO2 BLDA: 67 MMHG — LOW (ref 83–108)
PO2 BLDA: 99 MMHG — SIGNIFICANT CHANGE UP (ref 83–108)
POTASSIUM SERPL-MCNC: 4.6 MMOL/L — SIGNIFICANT CHANGE UP (ref 3.5–5.3)
POTASSIUM SERPL-MCNC: 4.9 MMOL/L — SIGNIFICANT CHANGE UP (ref 3.5–5.3)
POTASSIUM SERPL-SCNC: 4.6 MMOL/L — SIGNIFICANT CHANGE UP (ref 3.5–5.3)
POTASSIUM SERPL-SCNC: 4.9 MMOL/L — SIGNIFICANT CHANGE UP (ref 3.5–5.3)
PROTHROM AB SERPL-ACNC: 18.1 SEC — HIGH (ref 10.5–13.4)
RBC # BLD: 2.73 M/UL — LOW (ref 4.2–5.8)
RBC # BLD: 2.87 M/UL — LOW (ref 4.2–5.8)
RBC # FLD: 17.7 % — HIGH (ref 10.3–14.5)
RBC # FLD: 17.8 % — HIGH (ref 10.3–14.5)
RH IG SCN BLD-IMP: POSITIVE — SIGNIFICANT CHANGE UP
SAO2 % BLDA: 95.3 % — SIGNIFICANT CHANGE UP (ref 94–98)
SAO2 % BLDA: 97.3 % — SIGNIFICANT CHANGE UP (ref 94–98)
SODIUM SERPL-SCNC: 132 MMOL/L — LOW (ref 135–145)
SODIUM SERPL-SCNC: 132 MMOL/L — LOW (ref 135–145)
SPECIMEN SOURCE: SIGNIFICANT CHANGE UP
VANCOMYCIN TROUGH SERPL-MCNC: 10.9 UG/ML — SIGNIFICANT CHANGE UP (ref 10–20)
WBC # BLD: 26.34 K/UL — HIGH (ref 3.8–10.5)
WBC # BLD: 28.74 K/UL — HIGH (ref 3.8–10.5)
WBC # FLD AUTO: 26.34 K/UL — HIGH (ref 3.8–10.5)
WBC # FLD AUTO: 28.74 K/UL — HIGH (ref 3.8–10.5)

## 2023-07-03 PROCEDURE — 71045 X-RAY EXAM CHEST 1 VIEW: CPT | Mod: 26

## 2023-07-03 PROCEDURE — 99232 SBSQ HOSP IP/OBS MODERATE 35: CPT

## 2023-07-03 PROCEDURE — 99233 SBSQ HOSP IP/OBS HIGH 50: CPT | Mod: GC

## 2023-07-03 RX ORDER — FENTANYL CITRATE 50 UG/ML
0.5 INJECTION INTRAVENOUS
Qty: 2500 | Refills: 0 | Status: DISCONTINUED | OUTPATIENT
Start: 2023-07-03 | End: 2023-07-03

## 2023-07-03 RX ORDER — FUROSEMIDE 40 MG
20 TABLET ORAL ONCE
Refills: 0 | Status: COMPLETED | OUTPATIENT
Start: 2023-07-03 | End: 2023-07-03

## 2023-07-03 RX ORDER — PROPOFOL 10 MG/ML
10 INJECTION, EMULSION INTRAVENOUS
Qty: 1000 | Refills: 0 | Status: DISCONTINUED | OUTPATIENT
Start: 2023-07-03 | End: 2023-07-04

## 2023-07-03 RX ORDER — LOPERAMIDE HCL 2 MG
2 TABLET ORAL ONCE
Refills: 0 | Status: COMPLETED | OUTPATIENT
Start: 2023-07-03 | End: 2023-07-03

## 2023-07-03 RX ORDER — CHLORHEXIDINE GLUCONATE 213 G/1000ML
15 SOLUTION TOPICAL EVERY 12 HOURS
Refills: 0 | Status: DISCONTINUED | OUTPATIENT
Start: 2023-07-03 | End: 2023-07-03

## 2023-07-03 RX ORDER — OXYCODONE HYDROCHLORIDE 5 MG/1
5 TABLET ORAL EVERY 4 HOURS
Refills: 0 | Status: DISCONTINUED | OUTPATIENT
Start: 2023-07-03 | End: 2023-07-06

## 2023-07-03 RX ORDER — OXYCODONE HYDROCHLORIDE 5 MG/1
5 TABLET ORAL EVERY 4 HOURS
Refills: 0 | Status: DISCONTINUED | OUTPATIENT
Start: 2023-07-03 | End: 2023-07-03

## 2023-07-03 RX ORDER — IPRATROPIUM/ALBUTEROL SULFATE 18-103MCG
3 AEROSOL WITH ADAPTER (GRAM) INHALATION ONCE
Refills: 0 | Status: COMPLETED | OUTPATIENT
Start: 2023-07-03 | End: 2023-07-03

## 2023-07-03 RX ORDER — CHLORHEXIDINE GLUCONATE 213 G/1000ML
15 SOLUTION TOPICAL EVERY 12 HOURS
Refills: 0 | Status: DISCONTINUED | OUTPATIENT
Start: 2023-07-03 | End: 2023-07-09

## 2023-07-03 RX ORDER — CHLORHEXIDINE GLUCONATE 213 G/1000ML
1 SOLUTION TOPICAL DAILY
Refills: 0 | Status: DISCONTINUED | OUTPATIENT
Start: 2023-07-03 | End: 2023-07-13

## 2023-07-03 RX ORDER — NOREPINEPHRINE BITARTRATE/D5W 8 MG/250ML
0.05 PLASTIC BAG, INJECTION (ML) INTRAVENOUS
Qty: 8 | Refills: 0 | Status: DISCONTINUED | OUTPATIENT
Start: 2023-07-03 | End: 2023-07-03

## 2023-07-03 RX ADMIN — Medication 975 MILLIGRAM(S): at 12:30

## 2023-07-03 RX ADMIN — Medication 300 MILLIGRAM(S): at 17:12

## 2023-07-03 RX ADMIN — Medication 2 MILLIGRAM(S): at 11:58

## 2023-07-03 RX ADMIN — Medication 975 MILLIGRAM(S): at 18:37

## 2023-07-03 RX ADMIN — Medication 20 MILLIGRAM(S): at 10:17

## 2023-07-03 RX ADMIN — Medication 975 MILLIGRAM(S): at 06:02

## 2023-07-03 RX ADMIN — Medication 20 MILLIGRAM(S): at 17:33

## 2023-07-03 RX ADMIN — HYDROMORPHONE HYDROCHLORIDE 0.5 MILLIGRAM(S): 2 INJECTION INTRAMUSCULAR; INTRAVENOUS; SUBCUTANEOUS at 05:02

## 2023-07-03 RX ADMIN — LACOSAMIDE 200 MILLIGRAM(S): 50 TABLET ORAL at 06:02

## 2023-07-03 RX ADMIN — OXYCODONE HYDROCHLORIDE 10 MILLIGRAM(S): 5 TABLET ORAL at 07:07

## 2023-07-03 RX ADMIN — Medication 3 MILLILITER(S): at 00:39

## 2023-07-03 RX ADMIN — Medication 975 MILLIGRAM(S): at 23:07

## 2023-07-03 RX ADMIN — Medication 1 DROP(S): at 17:13

## 2023-07-03 RX ADMIN — Medication 3 MILLILITER(S): at 15:03

## 2023-07-03 RX ADMIN — CHLORHEXIDINE GLUCONATE 1 APPLICATION(S): 213 SOLUTION TOPICAL at 17:13

## 2023-07-03 RX ADMIN — HEPARIN SODIUM 5000 UNIT(S): 5000 INJECTION INTRAVENOUS; SUBCUTANEOUS at 23:03

## 2023-07-03 RX ADMIN — SODIUM CHLORIDE 4 MILLILITER(S): 9 INJECTION INTRAMUSCULAR; INTRAVENOUS; SUBCUTANEOUS at 15:04

## 2023-07-03 RX ADMIN — MEROPENEM 100 MILLIGRAM(S): 1 INJECTION INTRAVENOUS at 23:08

## 2023-07-03 RX ADMIN — OXYCODONE HYDROCHLORIDE 5 MILLIGRAM(S): 5 TABLET ORAL at 19:03

## 2023-07-03 RX ADMIN — Medication 975 MILLIGRAM(S): at 11:57

## 2023-07-03 RX ADMIN — SODIUM CHLORIDE 4 MILLILITER(S): 9 INJECTION INTRAMUSCULAR; INTRAVENOUS; SUBCUTANEOUS at 05:05

## 2023-07-03 RX ADMIN — Medication 3 MILLILITER(S): at 05:04

## 2023-07-03 RX ADMIN — PROPOFOL 5.46 MICROGRAM(S)/KG/MIN: 10 INJECTION, EMULSION INTRAVENOUS at 23:08

## 2023-07-03 RX ADMIN — OXYCODONE HYDROCHLORIDE 10 MILLIGRAM(S): 5 TABLET ORAL at 01:05

## 2023-07-03 RX ADMIN — Medication 15 MILLILITER(S): at 12:30

## 2023-07-03 RX ADMIN — MEROPENEM 100 MILLIGRAM(S): 1 INJECTION INTRAVENOUS at 07:07

## 2023-07-03 RX ADMIN — PANTOPRAZOLE SODIUM 40 MILLIGRAM(S): 20 TABLET, DELAYED RELEASE ORAL at 11:58

## 2023-07-03 RX ADMIN — HEPARIN SODIUM 5000 UNIT(S): 5000 INJECTION INTRAVENOUS; SUBCUTANEOUS at 06:04

## 2023-07-03 RX ADMIN — OXYCODONE HYDROCHLORIDE 10 MILLIGRAM(S): 5 TABLET ORAL at 12:33

## 2023-07-03 RX ADMIN — OXYCODONE HYDROCHLORIDE 5 MILLIGRAM(S): 5 TABLET ORAL at 23:07

## 2023-07-03 RX ADMIN — Medication 81 MILLIGRAM(S): at 11:57

## 2023-07-03 RX ADMIN — OXYCODONE HYDROCHLORIDE 5 MILLIGRAM(S): 5 TABLET ORAL at 15:28

## 2023-07-03 RX ADMIN — Medication 3 MILLILITER(S): at 20:52

## 2023-07-03 RX ADMIN — Medication 975 MILLIGRAM(S): at 00:53

## 2023-07-03 RX ADMIN — SODIUM CHLORIDE 4 MILLILITER(S): 9 INJECTION INTRAMUSCULAR; INTRAVENOUS; SUBCUTANEOUS at 20:53

## 2023-07-03 RX ADMIN — CHLORHEXIDINE GLUCONATE 15 MILLILITER(S): 213 SOLUTION TOPICAL at 17:12

## 2023-07-03 RX ADMIN — LACOSAMIDE 200 MILLIGRAM(S): 50 TABLET ORAL at 19:03

## 2023-07-03 RX ADMIN — OXYCODONE HYDROCHLORIDE 5 MILLIGRAM(S): 5 TABLET ORAL at 16:54

## 2023-07-03 RX ADMIN — HYDROMORPHONE HYDROCHLORIDE 0.5 MILLIGRAM(S): 2 INJECTION INTRAMUSCULAR; INTRAVENOUS; SUBCUTANEOUS at 04:47

## 2023-07-03 RX ADMIN — Medication 2 MILLIGRAM(S): at 06:04

## 2023-07-03 RX ADMIN — Medication 975 MILLIGRAM(S): at 07:02

## 2023-07-03 RX ADMIN — MEROPENEM 100 MILLIGRAM(S): 1 INJECTION INTRAVENOUS at 15:03

## 2023-07-03 RX ADMIN — SODIUM CHLORIDE 4 MILLILITER(S): 9 INJECTION INTRAMUSCULAR; INTRAVENOUS; SUBCUTANEOUS at 10:17

## 2023-07-03 RX ADMIN — Medication 2 MILLIGRAM(S): at 14:38

## 2023-07-03 RX ADMIN — OXYCODONE HYDROCHLORIDE 10 MILLIGRAM(S): 5 TABLET ORAL at 00:05

## 2023-07-03 RX ADMIN — Medication 975 MILLIGRAM(S): at 17:12

## 2023-07-03 RX ADMIN — Medication 300 MILLIGRAM(S): at 06:03

## 2023-07-03 RX ADMIN — Medication 100 MILLIGRAM(S): at 06:57

## 2023-07-03 RX ADMIN — Medication 2 MILLIGRAM(S): at 23:01

## 2023-07-03 RX ADMIN — Medication 3 MILLILITER(S): at 10:17

## 2023-07-03 RX ADMIN — OXYCODONE HYDROCHLORIDE 10 MILLIGRAM(S): 5 TABLET ORAL at 08:07

## 2023-07-03 RX ADMIN — Medication 1 DROP(S): at 06:04

## 2023-07-03 RX ADMIN — HEPARIN SODIUM 5000 UNIT(S): 5000 INJECTION INTRAVENOUS; SUBCUTANEOUS at 14:29

## 2023-07-03 RX ADMIN — OXYCODONE HYDROCHLORIDE 5 MILLIGRAM(S): 5 TABLET ORAL at 19:33

## 2023-07-03 RX ADMIN — OXYCODONE HYDROCHLORIDE 10 MILLIGRAM(S): 5 TABLET ORAL at 13:15

## 2023-07-03 NOTE — PROGRESS NOTE ADULT - SUBJECTIVE AND OBJECTIVE BOX
INTERVAL HPI/OVERNIGHT EVENTS:    Patient was seen and examined at bedside. Events noted     ROS:    unable to obtain     ANTIBIOTICS/RELEVANT:    MEDICATIONS  (STANDING):  acetaminophen   Oral Liquid .. 975 milliGRAM(s) Enteral Tube every 6 hours  albuterol/ipratropium for Nebulization 3 milliLiter(s) Nebulizer every 6 hours  artificial  tears Solution 1 Drop(s) Both EYES two times a day  aspirin  chewable 81 milliGRAM(s) Oral daily  chlorhexidine 2% Cloths 1 Application(s) Topical daily  dextrose 5%. 1000 milliLiter(s) (100 mL/Hr) IV Continuous <Continuous>  dextrose 50% Injectable 25 Gram(s) IV Push once  glucagon  Injectable 1 milliGRAM(s) IntraMuscular once  heparin   Injectable 5000 Unit(s) SubCutaneous every 8 hours  insulin lispro (ADMELOG) corrective regimen sliding scale   SubCutaneous every 6 hours  lacosamide Solution 200 milliGRAM(s) Oral two times a day  loperamide Liquid 2 milliGRAM(s) Enteral Tube every 8 hours  meropenem  IVPB 1000 milliGRAM(s) IV Intermittent every 8 hours  multivitamin/minerals/iron Oral Solution (CENTRUM) 15 milliLiter(s) Oral daily  oxyCODONE    Solution 10 milliGRAM(s) Oral every 6 hours  pantoprazole  Injectable 40 milliGRAM(s) IV Push daily  sodium chloride 3%  Inhalation 4 milliLiter(s) Inhalation every 6 hours  vancomycin  IVPB 1500 milliGRAM(s) IV Intermittent every 12 hours    MEDICATIONS  (PRN):  dextrose Oral Gel 15 Gram(s) Oral once PRN Blood Glucose LESS THAN 70 milliGRAM(s)/deciliter  HYDROmorphone  Injectable 0.5 milliGRAM(s) IV Push every 4 hours PRN breakthrough pain  ondansetron Injectable 4 milliGRAM(s) IV Push every 8 hours PRN Nausea and/or Vomiting  oxyCODONE    Solution 5 milliGRAM(s) Oral every 4 hours PRN Severe Pain (7 - 10)  sodium chloride 0.9% lock flush 10 milliLiter(s) IV Push every 1 hour PRN Pre/post blood products, medications, blood draw, and to maintain line patency        Vital Signs Last 24 Hrs  T(C): 36.7 (03 Jul 2023 10:00), Max: 39.3 (02 Jul 2023 14:00)  T(F): 98.1 (03 Jul 2023 10:00), Max: 102.7 (02 Jul 2023 14:00)  HR: 115 (03 Jul 2023 09:00) (99 - 134)  BP: 155/99 (03 Jul 2023 09:00) (104/70 - 156/93)  BP(mean): 121 (03 Jul 2023 09:00) (83 - 121)  RR: 20 (03 Jul 2023 09:00) (18 - 31)  SpO2: 94% (03 Jul 2023 09:00) (80% - 100%)    Parameters below as of 03 Jul 2023 09:00  Patient On (Oxygen Delivery Method): nasal cannula, high flow  O2 Flow (L/min): 50  O2 Concentration (%): 80    PHYSICAL EXAM:  Constitutional: chronically ill appearing  Eyes:IVAN, EOMI  Ear/Nose/Throat: no oral lesion   Neck:  supple  Respiratory:  + crackles bilaterally   Cardiovascular: S1S2 RRR, no murmurs  Gastrointestinal:soft, (+) BS, no HSM  Extremities:no e/e/c  Vascular: DP Pulse:	right normal; left normal      LABS:                        8.1    26.34 )-----------( 375      ( 03 Jul 2023 05:56 )             25.9     07-03    132<L>  |  104  |  10  ----------------------------<  102<H>  4.9   |  18<L>  |  0.57    Ca    7.8<L>      03 Jul 2023 05:56  Phos  3.9     07-03  Mg     1.9     07-03    TPro  6.2  /  Alb  2.3<L>  /  TBili  0.5  /  DBili  x   /  AST  15  /  ALT  10  /  AlkPhos  166<H>  07-02      Urinalysis Basic - ( 03 Jul 2023 05:56 )    Color: x / Appearance: x / SG: x / pH: x  Gluc: 102 mg/dL / Ketone: x  / Bili: x / Urobili: x   Blood: x / Protein: x / Nitrite: x   Leuk Esterase: x / RBC: x / WBC x   Sq Epi: x / Non Sq Epi: x / Bacteria: x        MICROBIOLOGY:    Culture - Blood (07.01.23 @ 18:38)    Specimen Source: .Blood Blood-Venous   Culture Results:   No growth at 1 day.        RADIOLOGY & ADDITIONAL STUDIES:    < from: CT Chest w/ Oral Cont and w/ IV Cont (07.02.23 @ 16:57) >  IMPRESSION: Redemonstration of known type A aortic dissection status post   placement of prosthetic aortic valve and thoracic aortic stent.  CABG surgery  Bilateral pleural effusions again noted with passive atelectasis the lung   bases.  NEW large area of groundglass density involving the right upper and   middle lobe suspicious for acute infectious inflammatory process  Persistent similar peripancreatic collections involving the pancreatic   body and tail as well as collection lateral to the left hepatic lobe. A   collection at the level of the distal body/tail extends to and is   inseparable from the gastric fundus/body Persistent collection in the   right inguinal region  Anasarca again seen  Small amount of ascites  Single drain is present in the abdomen  Stable areas of splenic infarction    < end of copied text >

## 2023-07-03 NOTE — AIRWAY PLACEMENT NOTE ADULT - POST AIRWAY PLACEMENT ASSESSMENT:
breath sounds bilateral/breath sounds equal/positive end tidal CO2 noted/CXR pending/chest excursion noted/skin color improved
breath sounds bilateral/breath sounds equal/positive end tidal CO2 noted/chest excursion noted

## 2023-07-03 NOTE — PROGRESS NOTE ADULT - ASSESSMENT
53M h/o seizure d/o, aortic dissection s/p AVR, aortic graft revision 3/20/23, 2nd stage TEVAR 5/5/23, course c/b peripancreatic/RP hemorrhage/hematoma formation s/p multiple washout and recurrent respiratory failure with most recent reintubation on 6/3 due to excessive secretions and difficulty protecting airway. Pulmonary consulted for percutaneous tracheostomy.     #Acute hypoxemic respiratory failure  #Pulmonary edema  #Aspiration pneumonia   #Critical illness neuropathy/myopathy    Patient with hypoxemic respiratory failure from critical illness myopathy requiring tracheostomy placement 6/6 and was decannulated 6/29 after trial with capping. Patient appears to be acutely decompensated with evidence of pulmonary edema on POCUS with bilateral B lines and small pleural effusions bilaterally. Concern for aspiration event, small L sided consolidation on POCUS. Agree with diuresis, keep TV ideally cc/kg IBW, wean vent as tolerated, cover for infection.    Recommend:   - Agree with more aggressive diuresis  - Keep tidal volume 6cc/kg or lower if concern for ARDS  - Broad spectrum antibiotics to cover HAP and anaerobics  - Please send sputum culture   - Please check eosinophils with next CBC as patient had eosinophilia during prior portion of admission     Patient s/e/d with Dr. Coronel.

## 2023-07-03 NOTE — PROGRESS NOTE PEDS - ASSESSMENT
53M w PMHx of HTN, type A aortic dissection s/p Dacron grafts, AV resuspension in 2013, CAD s/p CABG x 1 SVG to RCA (5/2013 with Dr. Medina), seizure disorder, recent admission 3/20-4/14 for replacement of transverse aortic arch, second stage TEVAR and aorto-axillary bypass, AV replacement (bio 23mm), and CABG x 1 (SVG-RCA). Pt found to have endo leak and taken to OR for TEVAR revision on 5/5. Post op course c/b Klebsiella bacteremia (5/15), resp failure requiring intubation on 5/18, Mucus plugging s/p Bronch 5/19 and PEA arrest. Post operatively pt also found to have hemorrhagic pancreatitis with venu-pancreatic abscess possibly 2* to Depakote therefore s/p IR aspiration of peripancreatic fluid collection and paracentesis on 5/22, IR venu-pancreatic abscess drainage and placement of drainage catheter on 5/24. Pt then transferred from CTICU to SICU for septic shock, and further mgmt of hemorrhagic pancreatitis on 5/25. s/p IR placement of 2 new drainage catheters and catheter exchange on 5/26. LUQ drainage 5/30. OR 5/31 for exlap washout & replacement of 3 new JPs and abthera vac with open abdomen. RTOR 6/1, no bleeding, evac of old blood, placement of feeding J-tube, failed extubation due to PNA--now completed ABX course ans s/p trach 6/5 and decannulation 6/28. Transferred to SICU 7/2 for respiratory distress, likely R side aspiration pneumonia and started on broad spectrum ABX.    Plan:   - Follow ID recommendations   - Follow cultures sent 7/2  - Pulmonary consult to tap right side effusion

## 2023-07-03 NOTE — PROGRESS NOTE ADULT - ASSESSMENT
IMPRESSION:  Worsening fever and leukocytosis in the setting of worsening respiratory status and worsening infiltrates on CXR. Clinical picture suggests health care associated pneumonia vs aspiration PNA    Recommend:  1.  Continue Vancomycin 1500 mg IV q12 + Meropenem 1 gram IV q8hrs  2.  Check tracheal aspirate culture  3.  Check vancomycin level before 4th dose    ID team 2 will follow.  Dr. Martinez will cover the service on 7/4. I will return on 7/5/23

## 2023-07-03 NOTE — CHART NOTE - NSCHARTNOTEFT_GEN_A_CORE
Called to bedside by RN that patient desaturating to the 60s on HFNC. Patient stepped up to SICU 7/2 for respiratory distress following decannulation on 6/26. Patient examined on morning rounds with critical care attending and saturating in 90s on HFNC 60%/60L. CXR this AM with worsening likely aspiration pneumonia/ARDS. Upon examination after notification from RN, patient tachypenic to 40s and tachycardic to 130s. Attempt to increase FiO2 and L/min on HFNC unsuccessful and subsequent nonrebreather mask placed without improved in O2 saturations. Given worsening respiratory distress, decision made to emergently intubate. Anesthesia called and patient emergently intubated at bedside with improvement of O2 saturation into mid 90s. CXR confirmed placement of ETT and showed worsening R infiltrates and stable L infiltrates. Vent settings titrated per ARDS protcol and STAT labs and ABG sent. Will continue to monitor. Called to bedside by RN that patient desaturating to the 60s on HFNC. Patient stepped up to SICU 7/2 for respiratory distress following decannulation on 6/26. Patient examined on morning rounds with critical care attending and saturating in 90s on HFNC 60%/60L. CXR this AM with worsening likely aspiration pneumonia/ARDS. Upon examination after notification from RN, patient tachypenic to 40s and tachycardic to 130s. Attempt to increase FiO2 and L/min on HFNC unsuccessful and subsequent nonrebreather mask placed without improved in O2 saturations. Given worsening respiratory distress, decision made to emergently intubate. Anesthesia called and patient emergently intubated at bedside with improvement of O2 saturation into mid 90s. CXR confirmed placement of ETT and showed worsening R infiltrates and stable L infiltrates. Vent settings titrated per ARDS protcol and STAT labs and ABG sent. Will continue to monitor.      ***Chief Resident Addendum***    As above, will continue to monitor, vent settings to be adjusted on ABG PRN. Sputum Cx obtained, results pending.

## 2023-07-03 NOTE — PROGRESS NOTE ADULT - ATTENDING COMMENTS
Known to our service, history as above, reintubated today as noted. Ultrasound w B lines. Antibiotics and diuresis, d/w Dr Wei.

## 2023-07-03 NOTE — PROGRESS NOTE ADULT - ASSESSMENT
53M w PMHx of HTN, type A aortic dissection s/p Dacron grafts, AV resuspension in 2013, CAD s/p CABG x 1 SVG to RCA (5/2013 with Dr. Medina), seizure disorder, recent admission 3/20-4/14 for replacement of transverse aortic arch, second stage TEVAR and aorto-axillary bypass, AV replacement (bio 23mm), and CABG x 1 (SVG-RCA). Pt found to have endo leak and taken to OR for TEVAR revision on 5/5. Post op course c/b Klebsiella bacteremia (5/15), resp failure requiring intubation on 5/18, Mucus plugging s/p Bronch 5/19 and PEA arrest. Post operatively pt also found to have hemorrhagic pancreatitis with venu-pancreatic abscess possibly 2* to Depakote therefore s/p IR aspiration of peripancreatic fluid collection and paracentesis on 5/22, IR venu-pancreatic abscess drainage and placement of drainage catheter on 5/24. Pt then transferred from CTICU to SICU for septic shock, and further mgmt of hemorrhagic pancreatitis on 5/25. s/p IR placement of 2 new drainage catheters and catheter exchange on 5/26. LUQ drainage 5/30. OR 5/31 for exlap washout & replacement of 3 new JPs and abthera vac with open abdomen. RTOR 6/1, no bleeding, evac of old blood, placement of feeding J-tube, failed extubation due to PNA--now completed ABX course ans s/p trach 6/5 and decannulation 6/28. Transferred to SICU (7/2) for respiratory distress. Emergently intubated (7/3) for aspiration pneumonia.     NEURO: Sedation: Propfol gtt, standing OxyIR 5q4h w Dilaudid breakthrough, continue wean as tolerated. Hx seizures: cont vimpat. Avoid depakote due to drug-related pancreatitis risk.   HEENT: Artificial tears, Torticollis improved  CV: s/p PEA arrest on 5/24 night. TTE (6/14) LVEF 75%, LVH, biatrial enlargement and elevated PA pressure (elevated from previous), mild to mod MR/TR . Cont ASA 81mg. Metoprolol stopped due to low BP after starting propofol. Lasix 20mg given.  PULM: Prior ARDS/PNA resolved and trach decannulated 6/28. Acute hypoxic respiratory failure, secondary to R side aspiration PNA, on broad spectrum ABX, Duonebs, 3% saline nebs, emergently intubated this morning and placed on A/C 460/20/8/40%. Repeat ABG pending.   GI/FEN: TF Vital 1.0 at 70cc via feeding J-tube with imodium 2mg TID, MTV, Protonix. Hemorrhagic pancreatitis: s/p multiple drain placements and paracentisis by IR team. Cdiff negative (6/19).  : Cantrell placed. Recent acute renal failure--now off CVVHD with renal recovery.   HEME: Acute blood loss anemia: now hg stable.   ENDO: mISS  ID: Sepsis without shock, pneumonia: Meropenem and Vanc per ID, pending Sputum Cx.  - LAST ABX Caspo (6/18-20), Erta (6/18-23), vanc (6/18-21).   - PRIOR ABX: Ertapenem (6/13-6/16), meropenem (6/9-6/13), vanco x 1 (6/9, 6/18-6/22), caspofungin (6/9-6/12, 6/18-6/21), completed Ceftriaxone( 6/5- 6/15) Chloe (5/21-6/5),  Caspo (5/23-5/30), Ampicillin (5/21- 5/27).   - Prior Cultures: Kleb bacteremia from 5/15 & 5/17. PNA w/ bronch cx kleb, enterobacter (zosyn, erta resistant), E faecalis, strep angionosus from 5/19, pancreatic abscess cx pan-sensitive kleb 5/22. PPX: SCDs, SQH  LINES: PIVs // L rad kalpana (5/31-6/15), 5Fr PIV in LUE (6/13-15), Lsubclav HD Cath (5/31-6/12), RIJ TLC (5/22-5/27), RIJ HD cath (5/22-31), R Ax kalpana(5/12-5/31), LIJ TLC (5/23-6/1), RIJ TLC (6/1-13)   WOUNDS/DRAINS: CODIE removed.   PT: COURT  Dispo: SICU

## 2023-07-03 NOTE — PROGRESS NOTE PEDS - SUBJECTIVE AND OBJECTIVE BOX
SUBJECTIVE: Patient seen at bedside during morning rounds. Spiking fever since yesterday. Cultures sent 7/2. On high flow nasal canula.      MEDICATIONS  (STANDING):  acetaminophen   Oral Liquid .. 975 milliGRAM(s) Enteral Tube every 6 hours  albuterol/ipratropium for Nebulization 3 milliLiter(s) Nebulizer every 6 hours  artificial  tears Solution 1 Drop(s) Both EYES two times a day  aspirin  chewable 81 milliGRAM(s) Oral daily  chlorhexidine 2% Cloths 1 Application(s) Topical daily  dextrose 5%. 1000 milliLiter(s) (100 mL/Hr) IV Continuous <Continuous>  dextrose 50% Injectable 25 Gram(s) IV Push once  glucagon  Injectable 1 milliGRAM(s) IntraMuscular once  heparin   Injectable 5000 Unit(s) SubCutaneous every 8 hours  insulin lispro (ADMELOG) corrective regimen sliding scale   SubCutaneous every 6 hours  lacosamide Solution 200 milliGRAM(s) Oral two times a day  loperamide Liquid 2 milliGRAM(s) Enteral Tube every 8 hours  meropenem  IVPB 1000 milliGRAM(s) IV Intermittent every 8 hours  multivitamin/minerals/iron Oral Solution (CENTRUM) 15 milliLiter(s) Oral daily  oxyCODONE    Solution 10 milliGRAM(s) Oral every 6 hours  pantoprazole  Injectable 40 milliGRAM(s) IV Push daily  sodium chloride 3%  Inhalation 4 milliLiter(s) Inhalation every 6 hours  vancomycin  IVPB 1500 milliGRAM(s) IV Intermittent every 12 hours    MEDICATIONS  (PRN):  dextrose Oral Gel 15 Gram(s) Oral once PRN Blood Glucose LESS THAN 70 milliGRAM(s)/deciliter  HYDROmorphone  Injectable 0.5 milliGRAM(s) IV Push every 4 hours PRN breakthrough pain  ondansetron Injectable 4 milliGRAM(s) IV Push every 8 hours PRN Nausea and/or Vomiting  oxyCODONE    Solution 5 milliGRAM(s) Oral every 4 hours PRN Severe Pain (7 - 10)  sodium chloride 0.9% lock flush 10 milliLiter(s) IV Push every 1 hour PRN Pre/post blood products, medications, blood draw, and to maintain line patency      ICU Vital Signs Last 24 Hrs  T(C): 36.7 (03 Jul 2023 10:00), Max: 39.3 (02 Jul 2023 14:00)  T(F): 98.1 (03 Jul 2023 10:00), Max: 102.7 (02 Jul 2023 14:00)  HR: 115 (03 Jul 2023 09:00) (99 - 134)  BP: 155/99 (03 Jul 2023 09:00) (104/70 - 156/93)  BP(mean): 121 (03 Jul 2023 09:00) (83 - 121)  ABP: --  ABP(mean): --  RR: 20 (03 Jul 2023 09:00) (18 - 31)  SpO2: 94% (03 Jul 2023 09:00) (80% - 100%)    O2 Parameters below as of 03 Jul 2023 09:00  Patient On (Oxygen Delivery Method): nasal cannula, high flow  O2 Flow (L/min): 50  O2 Concentration (%): 80        Physical Exam:  General: NAD, AxOx3, no focal deficits  HEENT: NC/AT, normal hearing, no oral lesions, neck supple w/o LAD, MMM  Pulmonary: anteriorly lungs clear with decreased sounds on RLL.   Cardiovascular: NSR  Abdominal: soft, mild TTP distractible, J tube site CDI  Extremities: WWP, 5/5 strength x 4, pitting edema        I&O's Summary    02 Jul 2023 07:01  -  03 Jul 2023 07:00  --------------------------------------------------------  IN: 3010 mL / OUT: 700 mL / NET: 2310 mL    03 Jul 2023 07:01  -  03 Jul 2023 10:31  --------------------------------------------------------  IN: 190 mL / OUT: 0 mL / NET: 190 mL        LABS:                        8.1    26.34 )-----------( 375      ( 03 Jul 2023 05:56 )             25.9     07-03    132<L>  |  104  |  10  ----------------------------<  102<H>  4.9   |  18<L>  |  0.57    Ca    7.8<L>      03 Jul 2023 05:56  Phos  3.9     07-03  Mg     1.9     07-03    TPro  6.2  /  Alb  2.3<L>  /  TBili  0.5  /  DBili  x   /  AST  15  /  ALT  10  /  AlkPhos  166<H>  07-02      Urinalysis Basic - ( 03 Jul 2023 05:56 )    Color: x / Appearance: x / SG: x / pH: x  Gluc: 102 mg/dL / Ketone: x  / Bili: x / Urobili: x   Blood: x / Protein: x / Nitrite: x   Leuk Esterase: x / RBC: x / WBC x   Sq Epi: x / Non Sq Epi: x / Bacteria: x      CAPILLARY BLOOD GLUCOSE      POCT Blood Glucose.: 115 mg/dL (03 Jul 2023 06:34)  POCT Blood Glucose.: 106 mg/dL (02 Jul 2023 23:13)  POCT Blood Glucose.: 88 mg/dL (02 Jul 2023 18:19)  POCT Blood Glucose.: 87 mg/dL (02 Jul 2023 11:36)    LIVER FUNCTIONS - ( 02 Jul 2023 08:25 )  Alb: 2.3 g/dL / Pro: 6.2 g/dL / ALK PHOS: 166 U/L / ALT: 10 U/L / AST: 15 U/L / GGT: x             Cultures:Culture Results:   No growth at 1 day. (07-01 @ 18:38)  Culture Results:   No growth at 1 day. (07-01 @ 18:38)      RADIOLOGY & ADDITIONAL STUDIES:  CT Chest, Abdomen and pelvis (7/2/23): Redemonstration of known type A aortic dissection status post placement of prosthetic aortic valve and thoracic aortic stent. CABG surgery. Bilateral pleural effusions again noted with passive atelectasis the lung bases.  NEW large area of groundglass density involving the right upper and middle lobe suspicious for acute infectious inflammatory process. Persistent similar peripancreatic collections involving the pancreatic body and tail as well as collection lateral to the left hepatic lobe. A collection at the level of the distal body/tail extends to and is inseparable from the gastric fundus/body Persistent collection in the right inguinal region. Anasarca again seen  Small amount of ascites. Single drain is present in the abdomen. Stable areas of splenic infarction  Chest Xray 7/3: Bilateral opacities/consolidations/pleural effusions, unchanged. Stable cardiomegaly, status post median sternotomy, aortic endovascular stent, aortic ectasia.

## 2023-07-03 NOTE — PROGRESS NOTE ADULT - SUBJECTIVE AND OBJECTIVE BOX
incomplete PULMONARY CONSULT SERVICE FOLLOW-UP NOTE    INTERVAL HPI:  Reviewed chart and overnight events; patient seen and examined at bedside. Intubated for hypoxemic respiratory failure this morning. Decannulated 6/29.     MEDICATIONS:  Pulmonary:  albuterol/ipratropium for Nebulization 3 milliLiter(s) Nebulizer every 6 hours  sodium chloride 3%  Inhalation 4 milliLiter(s) Inhalation every 6 hours    Antimicrobials:  meropenem  IVPB 1000 milliGRAM(s) IV Intermittent every 8 hours  vancomycin  IVPB 1500 milliGRAM(s) IV Intermittent every 12 hours    Anticoagulants:  aspirin  chewable 81 milliGRAM(s) Oral daily  heparin   Injectable 5000 Unit(s) SubCutaneous every 8 hours    Allergies  No Known Allergies    Vital Signs Last 24 Hrs  T(C): 38.1 (03 Jul 2023 17:55), Max: 38.6 (02 Jul 2023 20:30)  T(F): 100.6 (03 Jul 2023 17:55), Max: 101.5 (02 Jul 2023 20:30)  HR: 103 (03 Jul 2023 18:00) (99 - 134)  BP: 120/78 (03 Jul 2023 18:00) (91/60 - 156/93)  BP(mean): 95 (03 Jul 2023 18:00) (70 - 121)  RR: 28 (03 Jul 2023 18:00) (6 - 31)  SpO2: 93% (03 Jul 2023 18:00) (80% - 100%)    Parameters below as of 03 Jul 2023 18:00  Patient On (Oxygen Delivery Method): ventilator    O2 Concentration (%): 50    07-02 @ 07:01 - 07-03 @ 07:00  --------------------------------------------------------  IN: 3010 mL / OUT: 700 mL / NET: 2310 mL    07-03 @ 07:01  -  07-03 @ 18:52  --------------------------------------------------------  IN: 1516.9 mL / OUT: 775 mL / NET: 741.9 mL    Mode: AC/ CMV (Assist Control/ Continuous Mandatory Ventilation)    PHYSICAL EXAM:  Constitutional: thin man, intubated, comfortable  HEENT: NC/AT; anicteric sclera; MMM  Cardiovascular: +S1/S2, RRR  Respiratory: bilateral crackles; no W/R/R  Gastrointestinal: soft, NT/ND  Extremities: WWP; no clubbing or cyanosis; 2+ pitting edema to knees  Vascular: 2+ radial and pedal pulses  Neurological: AAOx3; no focal deficits    LABS:  ABG - ( 03 Jul 2023 15:20 )  pH, Arterial: 7.36  pH, Blood: x     /  pCO2: 33    /  pO2: 67    / HCO3: 19    / Base Excess: -5.9  /  SaO2: 95.3      CBC Full  -  ( 03 Jul 2023 12:10 )  WBC Count : 28.74 K/uL  RBC Count : 2.87 M/uL  Hemoglobin : 8.4 g/dL  Hematocrit : 26.8 %  Platelet Count - Automated : 492 K/uL  Mean Cell Volume : 93.4 fl  Mean Cell Hemoglobin : 29.3 pg  Mean Cell Hemoglobin Concentration : 31.3 gm/dL    07-03    132<L>  |  103  |  11  ----------------------------<  111<H>  4.6   |  19<L>  |  0.57    Ca    7.7<L>      03 Jul 2023 12:10  Phos  4.5     07-03  Mg     1.9     07-03    TPro  6.2  /  Alb  2.3<L>  /  TBili  0.5  /  DBili  x   /  AST  15  /  ALT  10  /  AlkPhos  166<H>  07-02    PT/INR - ( 03 Jul 2023 12:10 )   PT: 18.1 sec;   INR: 1.51       Urinalysis Basic - ( 03 Jul 2023 12:10 )    Color: x / Appearance: x / SG: x / pH: x  Gluc: 111 mg/dL / Ketone: x  / Bili: x / Urobili: x   Blood: x / Protein: x / Nitrite: x   Leuk Esterase: x / RBC: x / WBC x   Sq Epi: x / Non Sq Epi: x / Bacteria: x    RADIOLOGY & ADDITIONAL STUDIES: Reviewed.

## 2023-07-03 NOTE — PROGRESS NOTE ADULT - SUBJECTIVE AND OBJECTIVE BOX
INTERVAL/OVERNIGHT EVENTS: Transferred to SICU for respiratory distress, CT CAP shows new large area of ground glass density involving the right upper and middle lobe suspicious for acute infectious inflammatory process. Placed on High Flow Nasal Cannula, but pt kept taking it off due to discomfort. Febrile 101.    SUBJECTIVE: This AM, denies "shortness of breath" but states he doesn't feel right and thinks something bad is about to happen. No CP. +Fever. No N/V and abd pain controlled. Having BMs, Voids without issues    Neurologic Medications  acetaminophen   Oral Liquid .. 975 milliGRAM(s) Enteral Tube every 6 hours  HYDROmorphone  Injectable 0.5 milliGRAM(s) IV Push every 4 hours PRN breakthrough pain  lacosamide Solution 200 milliGRAM(s) Oral two times a day  ondansetron Injectable 4 milliGRAM(s) IV Push every 8 hours PRN Nausea and/or Vomiting  oxyCODONE    Solution 5 milliGRAM(s) Oral every 4 hours  propofol Infusion 10 MICROgram(s)/kG/Min IV Continuous <Continuous>    Respiratory Medications  albuterol/ipratropium for Nebulization 3 milliLiter(s) Nebulizer every 6 hours  sodium chloride 3%  Inhalation 4 milliLiter(s) Inhalation every 6 hours    Gastrointestinal Medications  dextrose 5%. 1000 milliLiter(s) IV Continuous <Continuous>  loperamide Liquid 2 milliGRAM(s) Enteral Tube every 8 hours  multivitamin/minerals/iron Oral Solution (CENTRUM) 15 milliLiter(s) Oral daily  pantoprazole  Injectable 40 milliGRAM(s) IV Push daily  sodium chloride 0.9% lock flush 10 milliLiter(s) IV Push every 1 hour PRN Pre/post blood products, medications, blood draw, and to maintain line patency    Hematologic/Oncologic Medications  aspirin  chewable 81 milliGRAM(s) Oral daily  heparin   Injectable 5000 Unit(s) SubCutaneous every 8 hours    Antimicrobial/Immunologic Medications  meropenem  IVPB 1000 milliGRAM(s) IV Intermittent every 8 hours  vancomycin  IVPB 1500 milliGRAM(s) IV Intermittent every 12 hours    Endocrine/Metabolic Medications  glucagon  Injectable 1 milliGRAM(s) IntraMuscular once  insulin lispro (ADMELOG) corrective regimen sliding scale   SubCutaneous every 6 hours    Topical/Other Medications  artificial  tears Solution 1 Drop(s) Both EYES two times a day  chlorhexidine 0.12% Liquid 15 milliLiter(s) Oral Mucosa every 12 hours  chlorhexidine 2% Cloths 1 Application(s) Topical daily    MEDICATIONS  (PRN):  dextrose Oral Gel 15 Gram(s) Oral once PRN Blood Glucose LESS THAN 70 milliGRAM(s)/deciliter  HYDROmorphone  Injectable 0.5 milliGRAM(s) IV Push every 4 hours PRN breakthrough pain  ondansetron Injectable 4 milliGRAM(s) IV Push every 8 hours PRN Nausea and/or Vomiting  sodium chloride 0.9% lock flush 10 milliLiter(s) IV Push every 1 hour PRN Pre/post blood products, medications, blood draw, and to maintain line patency    I&O's Detail    02 Jul 2023 07:01  -  03 Jul 2023 07:00  --------------------------------------------------------  IN:    dextrose 5% + sodium chloride 0.45%: 675 mL    Enteral Tube Flush: 290 mL    IV PiggyBack: 500 mL    IV PiggyBack: 100 mL    IV PiggyBack: 500 mL    Vital1.0: 945 mL  Total IN: 3010 mL    OUT:    Incontinent per Condom Catheter (mL): 700 mL  Total OUT: 700 mL    Total NET: 2310 mL    03 Jul 2023 07:01  -  03 Jul 2023 15:01  --------------------------------------------------------  IN:    Enteral Tube Flush: 240 mL    IV PiggyBack: 50 mL    Propofol: 78.7 mL    Vital1.0: 420 mL  Total IN: 788.7 mL    OUT:    Intermittent Catheterization - Urethral (mL): 250 mL  Total OUT: 250 mL    Total NET: 538.7 mL    Vital Signs Last 24 Hrs  T(C): 38.6 (03 Jul 2023 11:00), Max: 38.6 (02 Jul 2023 20:30)  T(F): 101.4 (03 Jul 2023 11:00), Max: 101.5 (02 Jul 2023 20:30)  HR: 104 (03 Jul 2023 14:00) (99 - 134)  BP: 102/65 (03 Jul 2023 14:00) (91/60 - 156/93)  BP(mean): 79 (03 Jul 2023 14:00) (70 - 121)  RR: 25 (03 Jul 2023 14:00) (6 - 31)  SpO2: 93% (03 Jul 2023 14:00) (80% - 100%)    Parameters below as of 03 Jul 2023 14:00  Patient On (Oxygen Delivery Method): ventilator    O2 Concentration (%): 50    GENERAL: In distress, AxOx3  HEENT: NCAT, MMM  C/V: sinus tachycardia  PULM: Labored breathing, respiratory distress, using accessory muscles. +egophony on R full lung area, +egophony L bases, mild bibasilar crackles. HFNC found on pt's forehead as he took it off--satting 80% on RA. When placed back, O2 sat low 90s.   ABD: Soft, ND, NT, J tube site CDI, no rebound tenderness, no guarding  EXTREM: WWP, pitting edema, SCDs in place  NEURO: No focal deficits    LABS:                        8.4    28.74 )-----------( 492      ( 03 Jul 2023 12:10 )             26.8     07-03    132<L>  |  103  |  11  ----------------------------<  111<H>  4.6   |  19<L>  |  0.57    Ca    7.7<L>      03 Jul 2023 12:10  Phos  4.5     07-03  Mg     1.9     07-03    TPro  6.2  /  Alb  2.3<L>  /  TBili  0.5  /  DBili  x   /  AST  15  /  ALT  10  /  AlkPhos  166<H>  07-02    PT/INR - ( 03 Jul 2023 12:10 )   PT: 18.1 sec;   INR: 1.51       Urinalysis Basic - ( 03 Jul 2023 12:10 )    Color: x / Appearance: x / SG: x / pH: x  Gluc: 111 mg/dL / Ketone: x  / Bili: x / Urobili: x   Blood: x / Protein: x / Nitrite: x   Leuk Esterase: x / RBC: x / WBC x   Sq Epi: x / Non Sq Epi: x / Bacteria: x    RADIOLOGY & ADDITIONAL STUDIES:  CT Abdomen and Pelvis w/ Oral Cont and w/ IV Cont:   ACC: [id number] EXAM:  CT ABDOMEN AND PELVIS OC IC   ORDERED BY: CLARISSA RAMOS     ACC: [id number] EXAM:  CT CHEST OC IC   ORDERED BY: CLARISSA RAMOS     PROCEDURE DATE:  07/02/2023      INTERPRETATION:  CLINICAL INFORMATION: Fever and tachycardiaand   leukocytosis. Post repair of type A aortic dissection. Status post CABG.    COMPARISON: CT angiography of the abdomen of 6/30/2023 and CT chest   abdomen pelvis of 6/16/2023    CONTRAST/COMPLICATIONS:  IV Contrast: Isovue 370  90 cc administered   10 cc discarded  Oral Contrast: Omnipaque 300  Complications: None reported at time of study completion    PROCEDURE:  CT of the Chest, Abdomen and Pelvis was performed.  Sagittal and coronal reformats were performed.    FINDINGS:  CHEST:  LUNGS AND LARGE AIRWAYS: Patent central airways. No pulmonary nodules.   There is collapse of the left lower lobe. There is extensive groundglass   opacity in the right upper and right middle lobes which may represent   active infectious inflammatory process. There is compressive atelectasis   in the right lower lobe  PLEURA: Bilateral moderate pleural effusions with areas of loculation.   Left pleural effusion appears increased  VESSELS: Postoperative change consistent with aortic arch and descending   aorta repair/stent. Prosthetic aortic valve is also appreciated. There is   no evidence of pulmonary embolism.  HEART: Heart size is normal. No pericardial effusion.  MEDIASTINUM AND MARJAN: No lymphadenopathy. CABG clips  CHEST WALL AND LOWER NECK: Within normal limits.    ABDOMEN AND PELVIS:  LIVER: Within normal limits. Collection inseparable from the lateral left   hepatic lobe is again seen with the largest component measuring 3.3 x 3.2   cm on image 75 of series 3 which is similar to the priorstudy  BILE DUCTS: Normal caliber.  GALLBLADDER: Within normal limits.  SPLEEN: Stable large focal area of low attenuation in the posterior   lateral spleen as well as smaller more linear areas of low attenuation   suggesting prior splenic infarctions.  PANCREAS: Multiple collections involving the pancreatic tail are similar   to the most recent prior study. The largest collection posteriorly at the   level of the tail measures 4.5 x 3.9 cm on image 76 of series 3 which is   similar to the prior study. A collection at the level of the distal   body/pancreatic tail is inseparable from the stomach  ADRENALS: Within normal limits.  KIDNEYS/URETERS: Within normal limits.    BLADDER: Within normal limits.  REPRODUCTIVE ORGANS: Prostate within normal limits.    BOWEL: No bowel obstruction. Appendix is normal.  PERITONEUM: A drain is seen entering via the left lower anterior   abdominal wall with the tip in the right upper abdomen. Small amount of   abdominal and pelvic ascites  VESSELS: The known aortic dissection in the abdominal aorta is again seen   again extending into the proximal left common iliac artery. There is   atherosclerotic calcification of the aorta again appreciated. The celiac   axis, SMA and bilateral renal arteries and inferior mesenteric artery to   opacify  RETROPERITONEUM/LYMPH NODES: No lymphadenopathy.  ABDOMINAL WALL: Anasarca. Surgical clips are seen in the right inguinal   area. Please correlate with surgical history. A fluid collection is again   seen in the subcutaneous soft tissues just inferior to the surgical clips   on image 166 of series 3 measuring 3.6 x 3.0 cm  BONES: Sternal wires. Mild degenerative changes.    IMPRESSION: Redemonstration of known type A aortic dissection status post   placement of prosthetic aortic valve and thoracic aortic stent.  CABG surgery  Bilateral pleural effusions again noted with passive atelectasis the lung   bases.  NEW large area of groundglass density involving the right upper and   middle lobe suspicious for acute infectious inflammatory process  Persistent similar peripancreatic collections involving the pancreatic   body and tail as well as collection lateral to the left hepatic lobe. A   collection at the level of the distal body/tail extends to and is   inseparable from the gastric fundus/body Persistent collection in the   right inguinal region  Anasarca again seen  Small amount of ascites  Single drain is present in the abdomen  Stable areas of splenic infarction    --- End of Report ---    AMEYA BARTON MD; Attending Radiologist  This document has been electronically signed. Jul 2 2023  6:15PM (07-02-23 @ 16:57)    Culture - Blood (collected 07-01-23 @ 18:38)  Source: .Blood Blood-Venous  Preliminary Report (07-02-23 @ 21:00):    No growth at 1 day.    Culture - Blood (collected 07-01-23 @ 18:38)  Source: .Blood Blood-Venous  Preliminary Report (07-02-23 @ 21:00):    No growth at 1 day.

## 2023-07-04 LAB
ALBUMIN SERPL ELPH-MCNC: 2 G/DL — LOW (ref 3.3–5)
ALP SERPL-CCNC: 146 U/L — HIGH (ref 40–120)
ALT FLD-CCNC: 9 U/L — LOW (ref 10–45)
ANION GAP SERPL CALC-SCNC: 12 MMOL/L — SIGNIFICANT CHANGE UP (ref 5–17)
ANISOCYTOSIS BLD QL: SLIGHT — SIGNIFICANT CHANGE UP
APTT BLD: 29.4 SEC — SIGNIFICANT CHANGE UP (ref 27.5–35.5)
AST SERPL-CCNC: 19 U/L — SIGNIFICANT CHANGE UP (ref 10–40)
BASOPHILS # BLD AUTO: 0 K/UL — SIGNIFICANT CHANGE UP (ref 0–0.2)
BASOPHILS NFR BLD AUTO: 0 % — SIGNIFICANT CHANGE UP (ref 0–2)
BILIRUB SERPL-MCNC: 0.4 MG/DL — SIGNIFICANT CHANGE UP (ref 0.2–1.2)
BUN SERPL-MCNC: 13 MG/DL — SIGNIFICANT CHANGE UP (ref 7–23)
BURR CELLS BLD QL SMEAR: PRESENT — SIGNIFICANT CHANGE UP
CALCIUM SERPL-MCNC: 7.9 MG/DL — LOW (ref 8.4–10.5)
CHLORIDE SERPL-SCNC: 104 MMOL/L — SIGNIFICANT CHANGE UP (ref 96–108)
CO2 SERPL-SCNC: 18 MMOL/L — LOW (ref 22–31)
CREAT SERPL-MCNC: 0.74 MG/DL — SIGNIFICANT CHANGE UP (ref 0.5–1.3)
EGFR: 108 ML/MIN/1.73M2 — SIGNIFICANT CHANGE UP
EOSINOPHIL # BLD AUTO: 0.32 K/UL — SIGNIFICANT CHANGE UP (ref 0–0.5)
EOSINOPHIL NFR BLD AUTO: 1.7 % — SIGNIFICANT CHANGE UP (ref 0–6)
GIANT PLATELETS BLD QL SMEAR: PRESENT — SIGNIFICANT CHANGE UP
GLUCOSE BLDC GLUCOMTR-MCNC: 84 MG/DL — SIGNIFICANT CHANGE UP (ref 70–99)
GLUCOSE BLDC GLUCOMTR-MCNC: 87 MG/DL — SIGNIFICANT CHANGE UP (ref 70–99)
GLUCOSE BLDC GLUCOMTR-MCNC: 91 MG/DL — SIGNIFICANT CHANGE UP (ref 70–99)
GLUCOSE SERPL-MCNC: 108 MG/DL — HIGH (ref 70–99)
HCT VFR BLD CALC: 25.3 % — LOW (ref 39–50)
HGB BLD-MCNC: 7.8 G/DL — LOW (ref 13–17)
HYPOCHROMIA BLD QL: SLIGHT — SIGNIFICANT CHANGE UP
INR BLD: 1.33 — HIGH (ref 0.88–1.16)
LYMPHOCYTES # BLD AUTO: 0.17 K/UL — LOW (ref 1–3.3)
LYMPHOCYTES # BLD AUTO: 0.9 % — LOW (ref 13–44)
MACROCYTES BLD QL: SLIGHT — SIGNIFICANT CHANGE UP
MAGNESIUM SERPL-MCNC: 2.1 MG/DL — SIGNIFICANT CHANGE UP (ref 1.6–2.6)
MANUAL SMEAR VERIFICATION: SIGNIFICANT CHANGE UP
MCHC RBC-ENTMCNC: 29.4 PG — SIGNIFICANT CHANGE UP (ref 27–34)
MCHC RBC-ENTMCNC: 30.8 GM/DL — LOW (ref 32–36)
MCV RBC AUTO: 95.5 FL — SIGNIFICANT CHANGE UP (ref 80–100)
MONOCYTES # BLD AUTO: 0.34 K/UL — SIGNIFICANT CHANGE UP (ref 0–0.9)
MONOCYTES NFR BLD AUTO: 1.8 % — LOW (ref 2–14)
NEUTROPHILS # BLD AUTO: 17.96 K/UL — HIGH (ref 1.8–7.4)
NEUTROPHILS NFR BLD AUTO: 95.6 % — HIGH (ref 43–77)
OVALOCYTES BLD QL SMEAR: SLIGHT — SIGNIFICANT CHANGE UP
PHOSPHATE SERPL-MCNC: 4.6 MG/DL — HIGH (ref 2.5–4.5)
PLAT MORPH BLD: ABNORMAL
PLATELET # BLD AUTO: 439 K/UL — HIGH (ref 150–400)
POIKILOCYTOSIS BLD QL AUTO: SLIGHT — SIGNIFICANT CHANGE UP
POLYCHROMASIA BLD QL SMEAR: SLIGHT — SIGNIFICANT CHANGE UP
POTASSIUM SERPL-MCNC: 4 MMOL/L — SIGNIFICANT CHANGE UP (ref 3.5–5.3)
POTASSIUM SERPL-SCNC: 4 MMOL/L — SIGNIFICANT CHANGE UP (ref 3.5–5.3)
PROT SERPL-MCNC: 5.3 G/DL — LOW (ref 6–8.3)
PROTHROM AB SERPL-ACNC: 15.9 SEC — HIGH (ref 10.5–13.4)
RBC # BLD: 2.65 M/UL — LOW (ref 4.2–5.8)
RBC # FLD: 17.7 % — HIGH (ref 10.3–14.5)
RBC BLD AUTO: ABNORMAL
SCHISTOCYTES BLD QL AUTO: SLIGHT — SIGNIFICANT CHANGE UP
SODIUM SERPL-SCNC: 134 MMOL/L — LOW (ref 135–145)
VANCOMYCIN FLD-MCNC: 20.2 UG/ML — SIGNIFICANT CHANGE UP
VANCOMYCIN TROUGH SERPL-MCNC: 24.4 UG/ML — HIGH (ref 10–20)
WBC # BLD: 18.79 K/UL — HIGH (ref 3.8–10.5)
WBC # FLD AUTO: 18.79 K/UL — HIGH (ref 3.8–10.5)

## 2023-07-04 PROCEDURE — 71045 X-RAY EXAM CHEST 1 VIEW: CPT | Mod: 26

## 2023-07-04 PROCEDURE — 99232 SBSQ HOSP IP/OBS MODERATE 35: CPT

## 2023-07-04 PROCEDURE — 99233 SBSQ HOSP IP/OBS HIGH 50: CPT | Mod: GC

## 2023-07-04 RX ORDER — LOPERAMIDE HCL 2 MG
2 TABLET ORAL ONCE
Refills: 0 | Status: COMPLETED | OUTPATIENT
Start: 2023-07-04 | End: 2023-07-04

## 2023-07-04 RX ORDER — POTASSIUM CHLORIDE 20 MEQ
20 PACKET (EA) ORAL ONCE
Refills: 0 | Status: COMPLETED | OUTPATIENT
Start: 2023-07-04 | End: 2023-07-04

## 2023-07-04 RX ORDER — FUROSEMIDE 40 MG
40 TABLET ORAL ONCE
Refills: 0 | Status: COMPLETED | OUTPATIENT
Start: 2023-07-04 | End: 2023-07-04

## 2023-07-04 RX ORDER — PROPOFOL 10 MG/ML
10 INJECTION, EMULSION INTRAVENOUS
Qty: 1000 | Refills: 0 | Status: DISCONTINUED | OUTPATIENT
Start: 2023-07-04 | End: 2023-07-08

## 2023-07-04 RX ORDER — VANCOMYCIN HCL 1 G
1250 VIAL (EA) INTRAVENOUS EVERY 12 HOURS
Refills: 0 | Status: DISCONTINUED | OUTPATIENT
Start: 2023-07-04 | End: 2023-07-04

## 2023-07-04 RX ADMIN — MEROPENEM 100 MILLIGRAM(S): 1 INJECTION INTRAVENOUS at 15:29

## 2023-07-04 RX ADMIN — MEROPENEM 100 MILLIGRAM(S): 1 INJECTION INTRAVENOUS at 07:04

## 2023-07-04 RX ADMIN — Medication 975 MILLIGRAM(S): at 12:00

## 2023-07-04 RX ADMIN — LACOSAMIDE 200 MILLIGRAM(S): 50 TABLET ORAL at 17:11

## 2023-07-04 RX ADMIN — Medication 975 MILLIGRAM(S): at 06:42

## 2023-07-04 RX ADMIN — PROPOFOL 5.46 MICROGRAM(S)/KG/MIN: 10 INJECTION, EMULSION INTRAVENOUS at 23:02

## 2023-07-04 RX ADMIN — MEROPENEM 100 MILLIGRAM(S): 1 INJECTION INTRAVENOUS at 23:03

## 2023-07-04 RX ADMIN — Medication 975 MILLIGRAM(S): at 11:02

## 2023-07-04 RX ADMIN — OXYCODONE HYDROCHLORIDE 5 MILLIGRAM(S): 5 TABLET ORAL at 23:10

## 2023-07-04 RX ADMIN — SODIUM CHLORIDE 4 MILLILITER(S): 9 INJECTION INTRAMUSCULAR; INTRAVENOUS; SUBCUTANEOUS at 05:52

## 2023-07-04 RX ADMIN — PANTOPRAZOLE SODIUM 40 MILLIGRAM(S): 20 TABLET, DELAYED RELEASE ORAL at 11:02

## 2023-07-04 RX ADMIN — OXYCODONE HYDROCHLORIDE 5 MILLIGRAM(S): 5 TABLET ORAL at 08:31

## 2023-07-04 RX ADMIN — HEPARIN SODIUM 5000 UNIT(S): 5000 INJECTION INTRAVENOUS; SUBCUTANEOUS at 23:02

## 2023-07-04 RX ADMIN — HEPARIN SODIUM 5000 UNIT(S): 5000 INJECTION INTRAVENOUS; SUBCUTANEOUS at 06:41

## 2023-07-04 RX ADMIN — Medication 50 MILLIEQUIVALENT(S): at 09:50

## 2023-07-04 RX ADMIN — Medication 15 MILLILITER(S): at 11:35

## 2023-07-04 RX ADMIN — OXYCODONE HYDROCHLORIDE 5 MILLIGRAM(S): 5 TABLET ORAL at 04:00

## 2023-07-04 RX ADMIN — Medication 2 MILLIGRAM(S): at 03:45

## 2023-07-04 RX ADMIN — Medication 2 MILLIGRAM(S): at 06:42

## 2023-07-04 RX ADMIN — OXYCODONE HYDROCHLORIDE 5 MILLIGRAM(S): 5 TABLET ORAL at 12:00

## 2023-07-04 RX ADMIN — CHLORHEXIDINE GLUCONATE 15 MILLILITER(S): 213 SOLUTION TOPICAL at 17:10

## 2023-07-04 RX ADMIN — Medication 1 DROP(S): at 17:34

## 2023-07-04 RX ADMIN — Medication 3 MILLILITER(S): at 16:25

## 2023-07-04 RX ADMIN — SODIUM CHLORIDE 4 MILLILITER(S): 9 INJECTION INTRAMUSCULAR; INTRAVENOUS; SUBCUTANEOUS at 09:28

## 2023-07-04 RX ADMIN — SODIUM CHLORIDE 4 MILLILITER(S): 9 INJECTION INTRAMUSCULAR; INTRAVENOUS; SUBCUTANEOUS at 22:25

## 2023-07-04 RX ADMIN — CHLORHEXIDINE GLUCONATE 15 MILLILITER(S): 213 SOLUTION TOPICAL at 06:41

## 2023-07-04 RX ADMIN — Medication 975 MILLIGRAM(S): at 18:00

## 2023-07-04 RX ADMIN — OXYCODONE HYDROCHLORIDE 5 MILLIGRAM(S): 5 TABLET ORAL at 16:14

## 2023-07-04 RX ADMIN — OXYCODONE HYDROCHLORIDE 5 MILLIGRAM(S): 5 TABLET ORAL at 04:21

## 2023-07-04 RX ADMIN — Medication 2 MILLIGRAM(S): at 23:03

## 2023-07-04 RX ADMIN — PROPOFOL 5.46 MICROGRAM(S)/KG/MIN: 10 INJECTION, EMULSION INTRAVENOUS at 07:03

## 2023-07-04 RX ADMIN — HEPARIN SODIUM 5000 UNIT(S): 5000 INJECTION INTRAVENOUS; SUBCUTANEOUS at 14:00

## 2023-07-04 RX ADMIN — OXYCODONE HYDROCHLORIDE 5 MILLIGRAM(S): 5 TABLET ORAL at 07:04

## 2023-07-04 RX ADMIN — Medication 3 MILLILITER(S): at 05:50

## 2023-07-04 RX ADMIN — CHLORHEXIDINE GLUCONATE 1 APPLICATION(S): 213 SOLUTION TOPICAL at 11:02

## 2023-07-04 RX ADMIN — Medication 2 MILLIGRAM(S): at 14:00

## 2023-07-04 RX ADMIN — Medication 81 MILLIGRAM(S): at 11:02

## 2023-07-04 RX ADMIN — Medication 3 MILLILITER(S): at 09:28

## 2023-07-04 RX ADMIN — OXYCODONE HYDROCHLORIDE 5 MILLIGRAM(S): 5 TABLET ORAL at 19:19

## 2023-07-04 RX ADMIN — Medication 975 MILLIGRAM(S): at 17:11

## 2023-07-04 RX ADMIN — Medication 40 MILLIGRAM(S): at 09:51

## 2023-07-04 RX ADMIN — OXYCODONE HYDROCHLORIDE 5 MILLIGRAM(S): 5 TABLET ORAL at 17:03

## 2023-07-04 RX ADMIN — Medication 1 DROP(S): at 06:43

## 2023-07-04 RX ADMIN — SODIUM CHLORIDE 4 MILLILITER(S): 9 INJECTION INTRAMUSCULAR; INTRAVENOUS; SUBCUTANEOUS at 16:25

## 2023-07-04 RX ADMIN — OXYCODONE HYDROCHLORIDE 5 MILLIGRAM(S): 5 TABLET ORAL at 04:05

## 2023-07-04 RX ADMIN — Medication 975 MILLIGRAM(S): at 23:03

## 2023-07-04 RX ADMIN — LACOSAMIDE 200 MILLIGRAM(S): 50 TABLET ORAL at 06:41

## 2023-07-04 RX ADMIN — Medication 3 MILLILITER(S): at 22:25

## 2023-07-04 RX ADMIN — OXYCODONE HYDROCHLORIDE 5 MILLIGRAM(S): 5 TABLET ORAL at 11:01

## 2023-07-04 NOTE — PROGRESS NOTE ADULT - SUBJECTIVE AND OBJECTIVE BOX
SUBJECTIVE:  Pt seen at bedside this AM. Pt febrile (100.4) this AM. Pt intubated.     MEDICATIONS  (STANDING):  acetaminophen   Oral Liquid .. 975 milliGRAM(s) Enteral Tube every 6 hours  albuterol/ipratropium for Nebulization 3 milliLiter(s) Nebulizer every 6 hours  artificial  tears Solution 1 Drop(s) Both EYES two times a day  aspirin  chewable 81 milliGRAM(s) Oral daily  chlorhexidine 0.12% Liquid 15 milliLiter(s) Oral Mucosa every 12 hours  chlorhexidine 2% Cloths 1 Application(s) Topical daily  dextrose 5%. 1000 milliLiter(s) (100 mL/Hr) IV Continuous <Continuous>  dextrose 50% Injectable 25 Gram(s) IV Push once  glucagon  Injectable 1 milliGRAM(s) IntraMuscular once  heparin   Injectable 5000 Unit(s) SubCutaneous every 8 hours  insulin lispro (ADMELOG) corrective regimen sliding scale   SubCutaneous every 6 hours  lacosamide Solution 200 milliGRAM(s) Oral two times a day  loperamide Liquid 2 milliGRAM(s) Enteral Tube every 8 hours  meropenem  IVPB 1000 milliGRAM(s) IV Intermittent every 8 hours  multivitamin/minerals/iron Oral Solution (CENTRUM) 15 milliLiter(s) Oral daily  oxyCODONE    Solution 5 milliGRAM(s) Oral every 4 hours  pantoprazole  Injectable 40 milliGRAM(s) IV Push daily  propofol Infusion 10 MICROgram(s)/kG/Min (5.46 mL/Hr) IV Continuous <Continuous>  sodium chloride 3%  Inhalation 4 milliLiter(s) Inhalation every 6 hours    MEDICATIONS  (PRN):  dextrose Oral Gel 15 Gram(s) Oral once PRN Blood Glucose LESS THAN 70 milliGRAM(s)/deciliter  HYDROmorphone  Injectable 0.5 milliGRAM(s) IV Push every 4 hours PRN breakthrough pain  ondansetron Injectable 4 milliGRAM(s) IV Push every 8 hours PRN Nausea and/or Vomiting  sodium chloride 0.9% lock flush 10 milliLiter(s) IV Push every 1 hour PRN Pre/post blood products, medications, blood draw, and to maintain line patency      Vital Signs Last 24 Hrs  T(C): 38 (04 Jul 2023 08:00), Max: 38.6 (03 Jul 2023 11:00)  T(F): 100.4 (04 Jul 2023 08:00), Max: 101.4 (03 Jul 2023 11:00)  HR: 114 (04 Jul 2023 08:00) (89 - 133)  BP: 129/77 (04 Jul 2023 08:00) (91/58 - 155/99)  BP(mean): 97 (04 Jul 2023 08:00) (70 - 121)  RR: 18 (04 Jul 2023 08:00) (6 - 37)  SpO2: 98% (04 Jul 2023 08:00) (81% - 100%)    Parameters below as of 04 Jul 2023 08:00  Patient On (Oxygen Delivery Method): ventilator    O2 Concentration (%): 50    Physical Exam:  General: NAD, resting comfortably in bed  Pulmonary: Nonlabored breathing, no respiratory distress  Cardiovascular: NSR  Abdominal: soft, NT/ND  Extremities: WWP, normal strength  Neuro: A/O x 3, CNs II-XII grossly intact, no focal deficits    I&O's Summary    03 Jul 2023 07:01  -  04 Jul 2023 07:00  --------------------------------------------------------  IN: 2717.6 mL / OUT: 1785 mL / NET: 932.6 mL    04 Jul 2023 07:01  -  04 Jul 2023 08:29  --------------------------------------------------------  IN: 79 mL / OUT: 60 mL / NET: 19 mL        LABS:                        7.8    18.79 )-----------( 439      ( 04 Jul 2023 05:14 )             25.3     07-04    134<L>  |  104  |  13  ----------------------------<  108<H>  4.0   |  18<L>  |  0.74    Ca    7.9<L>      04 Jul 2023 05:14  Phos  4.6     07-04  Mg     2.1     07-04    TPro  5.3<L>  /  Alb  2.0<L>  /  TBili  0.4  /  DBili  x   /  AST  19  /  ALT  9<L>  /  AlkPhos  146<H>  07-04    PT/INR - ( 04 Jul 2023 05:14 )   PT: 15.9 sec;   INR: 1.33          PTT - ( 04 Jul 2023 05:14 )  PTT:29.4 sec  Urinalysis Basic - ( 04 Jul 2023 05:14 )    Color: x / Appearance: x / SG: x / pH: x  Gluc: 108 mg/dL / Ketone: x  / Bili: x / Urobili: x   Blood: x / Protein: x / Nitrite: x   Leuk Esterase: x / RBC: x / WBC x   Sq Epi: x / Non Sq Epi: x / Bacteria: x      CAPILLARY BLOOD GLUCOSE      POCT Blood Glucose.: 99 mg/dL (03 Jul 2023 22:58)  POCT Blood Glucose.: 115 mg/dL (03 Jul 2023 18:08)  POCT Blood Glucose.: 98 mg/dL (03 Jul 2023 12:35)    LIVER FUNCTIONS - ( 04 Jul 2023 05:14 )  Alb: 2.0 g/dL / Pro: 5.3 g/dL / ALK PHOS: 146 U/L / ALT: 9 U/L / AST: 19 U/L / GGT: x             RADIOLOGY & ADDITIONAL STUDIES:

## 2023-07-04 NOTE — PROGRESS NOTE ADULT - ASSESSMENT
53M w PMHx of HTN, type A aortic dissection s/p Dacron grafts, AV resuspension in 2013, CAD s/p CABG x 1 SVG to RCA (5/2013 with Dr. Medina), seizure disorder, recent admission 3/20-4/14 for replacement of transverse aortic arch, second stage TEVAR and aorto-axillary bypass, AV replacement (bio 23mm), and CABG x 1 (SVG-RCA). Pt found to have endo leak and taken to OR for TEVAR revision on 5/5. Post op course c/b Klebsiella bacteremia (5/15), resp failure requiring intubation on 5/18, Mucus plugging s/p Bronch 5/19 and PEA arrest. Post operatively pt also found to have hemorrhagic pancreatitis with venu-pancreatic abscess possibly 2* to Depakote therefore s/p IR aspiration of peripancreatic fluid collection and paracentesis on 5/22, IR venu-pancreatic abscess drainage and placement of drainage catheter on 5/24. Pt then transferred from CTICU to SICU for septic shock, and further mgmt of hemorrhagic pancreatitis on 5/25. s/p IR placement of 2 new drainage catheters and catheter exchange on 5/26. LUQ drainage 5/30. OR 5/31 for exlap washout & replacement of 3 new JPs and abthera vac with open abdomen. RTOR 6/1, no bleeding, evac of old blood, placement of feeding J-tube, failed extubation due to PNA--now completed ABX course ans s/p trach 6/5 and decannulation 6/28. Transferred to SICU (7/2) for respiratory distress. Emergently intubated (7/3) for aspiration pneumonia.     NEURO: Sedation: Propfol gtt, standing OxyIR 5q4h w Dilaudid breakthrough, continue wean as tolerated. Hx seizures: cont vimpat. Avoid depakote due to drug-related pancreatitis risk.   HEENT: Artificial tears, Torticollis improved  CV: Total body fluid overloaded - diuresis; s/p PEA arrest on 5/24 night. TTE (6/14) LVEF 75%, LVH, biatrial enlargement and elevated PA pressure (elevated from previous), mild to mod MR/TR . Cont ASA 81mg. Metoprolol stopped due to low BP after starting propofol. Lasix 20mg given.  PULM: Acute hypoxic respiratory failure, secondary to R side aspiration PNA, on broad spectrum ABX, Duonebs, 3% saline nebs, emergently intubated (7/3) A/C 460/12/8/40% (ARDS settings) - CXR improved today. Prior ARDS/PNA resolved and trach decannulated 6/28.  GI/FEN: TF Vital 1.0 at 70cc via feeding J-tube with imodium 2mg TID, MTV, Protonix. Hemorrhagic pancreatitis: s/p multiple drain placements and paracentisis by IR team. Cdiff negative (6/19).  : Cantrell placed. Recent acute renal failure--now off CVVHD with renal recovery.   HEME: Acute blood loss anemia: now hg stable.   ENDO: mISS  ID: Sepsis without shock, pneumonia: Meropenem and Vanc per ID, pending Sputum Cx.  - LAST ABX Caspo (6/18-20), Erta (6/18-23), vanc (6/18-21).   - PRIOR ABX: Ertapenem (6/13-6/16), meropenem (6/9-6/13), vanco x 1 (6/9, 6/18-6/22), caspofungin (6/9-6/12, 6/18-6/21), completed Ceftriaxone( 6/5- 6/15) Chloe (5/21-6/5),  Caspo (5/23-5/30), Ampicillin (5/21- 5/27).   - Prior Cultures: Kleb bacteremia from 5/15 & 5/17. PNA w/ bronch cx kleb, enterobacter (zosyn, erta resistant), E faecalis, strep angionosus from 5/19, pancreatic abscess cx pan-sensitive kleb 5/22. PPX: SCDs, SQH  LINES: PIVs // L rad kalpana (5/31-6/15), 5Fr PIV in LUE (6/13-15), Lsubclav HD Cath (5/31-6/12), RIJ TLC (5/22-5/27), RIJ HD cath (5/22-31), R Ax kalpana(5/12-5/31), LIJ TLC (5/23-6/1), RIJ TLC (6/1-13)   WOUNDS/DRAINS: CODIE removed.   PT: COURT  Dispo: [facility]

## 2023-07-04 NOTE — PROGRESS NOTE ADULT - SUBJECTIVE AND OBJECTIVE BOX
INFECTIOUS DISEASES CONSULT FOLLOW-UP NOTE    INTERVAL HPI/OVERNIGHT EVENTS:  No event overnight  100.4 fever, vanc trough 24.4      ROS:   unable to obtain     ANTIBIOTICS/RELEVANT:    MEDICATIONS  (STANDING):  acetaminophen   Oral Liquid .. 975 milliGRAM(s) Enteral Tube every 6 hours  albuterol/ipratropium for Nebulization 3 milliLiter(s) Nebulizer every 6 hours  artificial  tears Solution 1 Drop(s) Both EYES two times a day  aspirin  chewable 81 milliGRAM(s) Oral daily  chlorhexidine 0.12% Liquid 15 milliLiter(s) Oral Mucosa every 12 hours  chlorhexidine 2% Cloths 1 Application(s) Topical daily  dextrose 5%. 1000 milliLiter(s) (100 mL/Hr) IV Continuous <Continuous>  dextrose 50% Injectable 25 Gram(s) IV Push once  glucagon  Injectable 1 milliGRAM(s) IntraMuscular once  heparin   Injectable 5000 Unit(s) SubCutaneous every 8 hours  insulin lispro (ADMELOG) corrective regimen sliding scale   SubCutaneous every 6 hours  lacosamide Solution 200 milliGRAM(s) Oral two times a day  loperamide Liquid 2 milliGRAM(s) Enteral Tube every 8 hours  meropenem  IVPB 1000 milliGRAM(s) IV Intermittent every 8 hours  multivitamin/minerals/iron Oral Solution (CENTRUM) 15 milliLiter(s) Oral daily  oxyCODONE    Solution 5 milliGRAM(s) Oral every 4 hours  pantoprazole  Injectable 40 milliGRAM(s) IV Push daily  propofol Infusion 10 MICROgram(s)/kG/Min (5.46 mL/Hr) IV Continuous <Continuous>  sodium chloride 3%  Inhalation 4 milliLiter(s) Inhalation every 6 hours    MEDICATIONS  (PRN):  dextrose Oral Gel 15 Gram(s) Oral once PRN Blood Glucose LESS THAN 70 milliGRAM(s)/deciliter  HYDROmorphone  Injectable 0.5 milliGRAM(s) IV Push every 4 hours PRN breakthrough pain  ondansetron Injectable 4 milliGRAM(s) IV Push every 8 hours PRN Nausea and/or Vomiting  sodium chloride 0.9% lock flush 10 milliLiter(s) IV Push every 1 hour PRN Pre/post blood products, medications, blood draw, and to maintain line patency        Vital Signs Last 24 Hrs  T(C): 37.3 (04 Jul 2023 12:00), Max: 38.1 (03 Jul 2023 17:55)  T(F): 99.2 (04 Jul 2023 12:00), Max: 100.6 (03 Jul 2023 17:55)  HR: 109 (04 Jul 2023 12:28) (89 - 120)  BP: 94/58 (04 Jul 2023 12:00) (91/58 - 142/80)  BP(mean): 71 (04 Jul 2023 12:00) (70 - 107)  RR: 20 (04 Jul 2023 12:28) (18 - 37)  SpO2: 100% (04 Jul 2023 12:28) (85% - 100%)    Parameters below as of 04 Jul 2023 12:28  Patient On (Oxygen Delivery Method): ventilator    O2 Concentration (%): 50    07-03-23 @ 07:01  -  07-04-23 @ 07:00  --------------------------------------------------------  IN: 2717.6 mL / OUT: 1785 mL / NET: 932.6 mL    07-04-23 @ 07:01  -  07-04-23 @ 13:02  --------------------------------------------------------  IN: 569 mL / OUT: 670 mL / NET: -101.1 mL      PHYSICAL EXAM:  Constitutional: NAD  Eyes: the sclera and conjunctiva were normal.   ENT: the ears and nose were normal in appearance. ET tube   Neck: the appearance of the neck was normal and the neck was supple.   Pulmonary: no respiratory distress and lungs were clear to auscultation bilaterally.   Heart: heart rate was normal and rhythm regular, normal S1 and S2  Vascular:. there was no peripheral edema  Abdomen: normal bowel sounds, soft, non-tender        LABS:                        7.8    18.79 )-----------( 439      ( 04 Jul 2023 05:14 )             25.3     07-04    134<L>  |  104  |  13  ----------------------------<  108<H>  4.0   |  18<L>  |  0.74    Ca    7.9<L>      04 Jul 2023 05:14  Phos  4.6     07-04  Mg     2.1     07-04    TPro  5.3<L>  /  Alb  2.0<L>  /  TBili  0.4  /  DBili  x   /  AST  19  /  ALT  9<L>  /  AlkPhos  146<H>  07-04    PT/INR - ( 04 Jul 2023 05:14 )   PT: 15.9 sec;   INR: 1.33          PTT - ( 04 Jul 2023 05:14 )  PTT:29.4 sec  Urinalysis Basic - ( 04 Jul 2023 05:14 )    Color: x / Appearance: x / SG: x / pH: x  Gluc: 108 mg/dL / Ketone: x  / Bili: x / Urobili: x   Blood: x / Protein: x / Nitrite: x   Leuk Esterase: x / RBC: x / WBC x   Sq Epi: x / Non Sq Epi: x / Bacteria: x        MICROBIOLOGY:      RADIOLOGY & ADDITIONAL STUDIES:  Reviewed

## 2023-07-04 NOTE — PROGRESS NOTE ADULT - ASSESSMENT
53M h/o Type A aortic dissection s/p repair, s/p aortic arch and AV replacement in 3/2023 p/w endo leak, s/p TEVAR revision.  Course c/b PNA, bacteremia, now with fevere and leukocytosis.  Suspect HAP vs aspiration PNA.      - reduce vanco to 1g IV q12h once vanc level <20  - cont shin 1g IV q8h  - f/u trach aspirate culture    Team 2 will follow you.  Dr Nicolas will resume care tomorrow.  Case d/w primary team.    Reyna Martinez MD, MS  Infectious Disease attending  work cell [phone]   For any questions during evening/weekend/holiday, please page ID on call

## 2023-07-04 NOTE — PROGRESS NOTE ADULT - NUTRITIONAL ASSESSMENT
53M w PMHx of HTN, type A aortic dissection s/p Dacron grafts, AV resuspension in 2013, CAD s/p CABG x 1 SVG to RCA (5/2013 with Dr. Medina), seizure disorder, recent admission 3/20-4/14 for replacement of transverse aortic arch, second stage TEVAR and aorto-axillary bypass, AV replacement (bio 23mm), and CABG x 1 (SVG-RCA). Pt found to have endo leak and taken to OR for TEVAR revision on 5/5. Post op course c/b Klebsiella bacteremia (5/15), resp failure requiring intubation on 5/18, Mucus plugging s/p Bronch 5/19 and PEA arrest. Post operatively pt also found to have hemorrhagic pancreatitis with venu-pancreatic abscess possibly 2* to Depakote therefore s/p IR aspiration of peripancreatic fluid collection and paracentesis on 5/22, IR venu-pancreatic abscess drainage and placement of drainage catheter on 5/24. Pt then transferred from CTICU to SICU for septic shock, and further mgmt of hemorrhagic pancreatitis on 5/25. s/p IR placement of 2 new drainage catheters and catheter exchange on 5/26. LUQ drainage 5/30. OR 5/31 for exlap washout & replacement of 3 new JPs and abthera vac with open abdomen. RTOR 6/1, no bleeding, evac of old blood, placement of feeding J-tube, failed extubation due to PNA--now completed ABX course ans s/p trach 6/5 and decannulation 6/28. Transferred to SICU 7/2 for respiratory distress, likely R side aspiration pneumonia and started on broad spectrum ABX. Pt intubated 7/3 for acute respiratory failure 2/2 sepsis.     Plan:   - Follow ID recommendations   - Follow cultures sent 7/2  - Pulmonary consult to tap right side effusion 53M w PMHx of HTN, type A aortic dissection s/p Dacron grafts, AV resuspension in 2013, CAD s/p CABG x 1 SVG to RCA (5/2013 with Dr. Medina), seizure disorder, recent admission 3/20-4/14 for replacement of transverse aortic arch, second stage TEVAR and aorto-axillary bypass, AV replacement (bio 23mm), and CABG x 1 (SVG-RCA). Pt found to have endo leak and taken to OR for TEVAR revision on 5/5. Post op course c/b Klebsiella bacteremia (5/15), resp failure requiring intubation on 5/18, Mucus plugging s/p Bronch 5/19 and PEA arrest. Post operatively pt also found to have hemorrhagic pancreatitis with venu-pancreatic abscess possibly 2* to Depakote therefore s/p IR aspiration of peripancreatic fluid collection and paracentesis on 5/22, IR venu-pancreatic abscess drainage and placement of drainage catheter on 5/24. Pt then transferred from CTICU to SICU for septic shock, and further mgmt of hemorrhagic pancreatitis on 5/25. s/p IR placement of 2 new drainage catheters and catheter exchange on 5/26. LUQ drainage 5/30. OR 5/31 for exlap washout & replacement of 3 new JPs and abthera vac with open abdomen. RTOR 6/1, no bleeding, evac of old blood, placement of feeding J-tube, failed extubation due to PNA--now completed ABX course ans s/p trach 6/5 and decannulation 6/28. Transferred to SICU 7/2 for respiratory distress, likely R side aspiration pneumonia and started on broad spectrum ABX. Pt intubated 7/3 for acute respiratory failure 2/2 sepsis.     Plan:   wean to extubate   keep fluid status net negative

## 2023-07-04 NOTE — PROGRESS NOTE ADULT - ATTENDING COMMENTS
Seizures, s/p TEVAR c/b severe pancreatitis, Klebsiella peritonitis, bacteremia and pneumonia now with recurrent aspiration pna with AHRF  physical as above  continue MV with 6ml/kg TV  empiric vancomycin/meropenem  check cystatin C  diurese today with edema  RASS to -3

## 2023-07-04 NOTE — PROGRESS NOTE ADULT - SUBJECTIVE AND OBJECTIVE BOX
ON: ON: pulm - aggressive diuresis, TV 6cc/kg or lower, Abx, Sputum Cx, f/u eosinophils; ++BMs, additional dose imodium, WBC 18 (28), Hgb 7.8 (8.4),     SUBJECTIVE: Intubated, sedated    MEDICATIONS  (STANDING):  acetaminophen   Oral Liquid .. 975 milliGRAM(s) Enteral Tube every 6 hours  albuterol/ipratropium for Nebulization 3 milliLiter(s) Nebulizer every 6 hours  artificial  tears Solution 1 Drop(s) Both EYES two times a day  aspirin  chewable 81 milliGRAM(s) Oral daily  chlorhexidine 0.12% Liquid 15 milliLiter(s) Oral Mucosa every 12 hours  chlorhexidine 2% Cloths 1 Application(s) Topical daily  dextrose 5%. 1000 milliLiter(s) (100 mL/Hr) IV Continuous <Continuous>  dextrose 50% Injectable 25 Gram(s) IV Push once  glucagon  Injectable 1 milliGRAM(s) IntraMuscular once  heparin   Injectable 5000 Unit(s) SubCutaneous every 8 hours  insulin lispro (ADMELOG) corrective regimen sliding scale   SubCutaneous every 6 hours  lacosamide Solution 200 milliGRAM(s) Oral two times a day  loperamide Liquid 2 milliGRAM(s) Enteral Tube every 8 hours  meropenem  IVPB 1000 milliGRAM(s) IV Intermittent every 8 hours  multivitamin/minerals/iron Oral Solution (CENTRUM) 15 milliLiter(s) Oral daily  oxyCODONE    Solution 5 milliGRAM(s) Oral every 4 hours  pantoprazole  Injectable 40 milliGRAM(s) IV Push daily  propofol Infusion 10 MICROgram(s)/kG/Min (5.46 mL/Hr) IV Continuous <Continuous>  sodium chloride 3%  Inhalation 4 milliLiter(s) Inhalation every 6 hours    MEDICATIONS  (PRN):  dextrose Oral Gel 15 Gram(s) Oral once PRN Blood Glucose LESS THAN 70 milliGRAM(s)/deciliter  HYDROmorphone  Injectable 0.5 milliGRAM(s) IV Push every 4 hours PRN breakthrough pain  ondansetron Injectable 4 milliGRAM(s) IV Push every 8 hours PRN Nausea and/or Vomiting  sodium chloride 0.9% lock flush 10 milliLiter(s) IV Push every 1 hour PRN Pre/post blood products, medications, blood draw, and to maintain line patency      Drips:     ICU Vital Signs Last 24 Hrs  T(C): 37.3 (04 Jul 2023 12:00), Max: 38.1 (03 Jul 2023 17:55)  T(F): 99.2 (04 Jul 2023 12:00), Max: 100.6 (03 Jul 2023 17:55)  HR: 109 (04 Jul 2023 12:00) (89 - 120)  BP: 94/58 (04 Jul 2023 12:00) (91/58 - 142/80)  BP(mean): 71 (04 Jul 2023 12:00) (70 - 107)  ABP: --  ABP(mean): --  RR: 21 (04 Jul 2023 12:00) (18 - 37)  SpO2: 99% (04 Jul 2023 12:00) (85% - 100%)    O2 Parameters below as of 04 Jul 2023 12:00  Patient On (Oxygen Delivery Method): ventilator    O2 Concentration (%): 50        Physical Exam:  General: NAD, appears comfortable   HEENT: NC/AT  Pulmonary: Intuabted; vents settings:AC/ CMV (Assist Control/ Continuous Mandatory Ventilation), RR (machine): 20, TV (machine): 460, FiO2: 40, PEEP: 8, ITime: 1, MAP: 12, PIP: 16; Nonlabored breathing, no respiratory distress, CTA-B  Cardiovascular: NSR, no murmurs  Abdominal: soft, ND, grimace to palpation diffusely, J tube  : scrotal edema, coulter in place with clear urine  Extremities: Edema throughout the extremities WWP, no clubbing/cyanosis  Neuro: A/O x3, CNs II-XII grossly intact, normal motor/sensation, no focal deficits          I&O's Summary    03 Jul 2023 07:01  -  04 Jul 2023 07:00  --------------------------------------------------------  IN: 2717.6 mL / OUT: 1785 mL / NET: 932.6 mL    04 Jul 2023 07:01  -  04 Jul 2023 12:43  --------------------------------------------------------  IN: 569 mL / OUT: 670 mL / NET: -101.1 mL        LABS:                        7.8    18.79 )-----------( 439      ( 04 Jul 2023 05:14 )             25.3     07-04    134<L>  |  104  |  13  ----------------------------<  108<H>  4.0   |  18<L>  |  0.74    Ca    7.9<L>      04 Jul 2023 05:14  Phos  4.6     07-04  Mg     2.1     07-04    TPro  5.3<L>  /  Alb  2.0<L>  /  TBili  0.4  /  DBili  x   /  AST  19  /  ALT  9<L>  /  AlkPhos  146<H>  07-04    PT/INR - ( 04 Jul 2023 05:14 )   PT: 15.9 sec;   INR: 1.33          PTT - ( 04 Jul 2023 05:14 )  PTT:29.4 sec  Urinalysis Basic - ( 04 Jul 2023 05:14 )    Color: x / Appearance: x / SG: x / pH: x  Gluc: 108 mg/dL / Ketone: x  / Bili: x / Urobili: x   Blood: x / Protein: x / Nitrite: x   Leuk Esterase: x / RBC: x / WBC x   Sq Epi: x / Non Sq Epi: x / Bacteria: x      CAPILLARY BLOOD GLUCOSE      POCT Blood Glucose.: 87 mg/dL (04 Jul 2023 11:11)  POCT Blood Glucose.: 99 mg/dL (03 Jul 2023 22:58)  POCT Blood Glucose.: 115 mg/dL (03 Jul 2023 18:08)    LIVER FUNCTIONS - ( 04 Jul 2023 05:14 )  Alb: 2.0 g/dL / Pro: 5.3 g/dL / ALK PHOS: 146 U/L / ALT: 9 U/L / AST: 19 U/L / GGT: x             Cultures:Culture Results:   Culture in progress (07-03 @ 12:13)      RADIOLOGY & ADDITIONAL STUDIES:     ON: ON: pulm - aggressive diuresis, TV 6cc/kg or lower, Abx, Sputum Cx, f/u eosinophils; ++BMs, additional dose imodium, WBC 18 (28), Hgb 7.8 (8.4),     SUBJECTIVE: Intubated, sedated    MEDICATIONS  (STANDING):  acetaminophen   Oral Liquid .. 975 milliGRAM(s) Enteral Tube every 6 hours  albuterol/ipratropium for Nebulization 3 milliLiter(s) Nebulizer every 6 hours  artificial  tears Solution 1 Drop(s) Both EYES two times a day  aspirin  chewable 81 milliGRAM(s) Oral daily  chlorhexidine 0.12% Liquid 15 milliLiter(s) Oral Mucosa every 12 hours  chlorhexidine 2% Cloths 1 Application(s) Topical daily  dextrose 5%. 1000 milliLiter(s) (100 mL/Hr) IV Continuous <Continuous>  dextrose 50% Injectable 25 Gram(s) IV Push once  glucagon  Injectable 1 milliGRAM(s) IntraMuscular once  heparin   Injectable 5000 Unit(s) SubCutaneous every 8 hours  insulin lispro (ADMELOG) corrective regimen sliding scale   SubCutaneous every 6 hours  lacosamide Solution 200 milliGRAM(s) Oral two times a day  loperamide Liquid 2 milliGRAM(s) Enteral Tube every 8 hours  meropenem  IVPB 1000 milliGRAM(s) IV Intermittent every 8 hours  multivitamin/minerals/iron Oral Solution (CENTRUM) 15 milliLiter(s) Oral daily  oxyCODONE    Solution 5 milliGRAM(s) Oral every 4 hours  pantoprazole  Injectable 40 milliGRAM(s) IV Push daily  propofol Infusion 10 MICROgram(s)/kG/Min (5.46 mL/Hr) IV Continuous <Continuous>  sodium chloride 3%  Inhalation 4 milliLiter(s) Inhalation every 6 hours    MEDICATIONS  (PRN):  dextrose Oral Gel 15 Gram(s) Oral once PRN Blood Glucose LESS THAN 70 milliGRAM(s)/deciliter  HYDROmorphone  Injectable 0.5 milliGRAM(s) IV Push every 4 hours PRN breakthrough pain  ondansetron Injectable 4 milliGRAM(s) IV Push every 8 hours PRN Nausea and/or Vomiting  sodium chloride 0.9% lock flush 10 milliLiter(s) IV Push every 1 hour PRN Pre/post blood products, medications, blood draw, and to maintain line patency      Drips:     ICU Vital Signs Last 24 Hrs  T(C): 37.3 (04 Jul 2023 12:00), Max: 38.1 (03 Jul 2023 17:55)  T(F): 99.2 (04 Jul 2023 12:00), Max: 100.6 (03 Jul 2023 17:55)  HR: 109 (04 Jul 2023 12:00) (89 - 120)  BP: 94/58 (04 Jul 2023 12:00) (91/58 - 142/80)  BP(mean): 71 (04 Jul 2023 12:00) (70 - 107)  ABP: --  ABP(mean): --  RR: 21 (04 Jul 2023 12:00) (18 - 37)  SpO2: 99% (04 Jul 2023 12:00) (85% - 100%)    O2 Parameters below as of 04 Jul 2023 12:00  Patient On (Oxygen Delivery Method): ventilator    O2 Concentration (%): 50        Physical Exam:  General: NAD, appears comfortable   HEENT: NC/AT  Pulmonary: Intuabted; vents settings:AC/ CMV (Assist Control/ Continuous Mandatory Ventilation), RR (machine): 20, TV (machine): 460, FiO2: 40, PEEP: 8, ITime: 1, MAP: 12, PIP: 16; Nonlabored breathing, no respiratory distress, rhonchi  Cardiovascular: NSR, murmur (prior)  Abdominal: soft, ND, grimace to palpation diffusely, J tube  : scrotal edema, coulter in place with clear urine  Extremities: Edema throughout the extremities WWP, no clubbing/cyanosis  Neuro: A/O x3, CNs II-XII grossly intact, normal motor/sensation, no focal deficits          I&O's Summary    03 Jul 2023 07:01  -  04 Jul 2023 07:00  --------------------------------------------------------  IN: 2717.6 mL / OUT: 1785 mL / NET: 932.6 mL    04 Jul 2023 07:01  -  04 Jul 2023 12:43  --------------------------------------------------------  IN: 569 mL / OUT: 670 mL / NET: -101.1 mL        LABS:                        7.8    18.79 )-----------( 439      ( 04 Jul 2023 05:14 )             25.3     07-04    134<L>  |  104  |  13  ----------------------------<  108<H>  4.0   |  18<L>  |  0.74    Ca    7.9<L>      04 Jul 2023 05:14  Phos  4.6     07-04  Mg     2.1     07-04    TPro  5.3<L>  /  Alb  2.0<L>  /  TBili  0.4  /  DBili  x   /  AST  19  /  ALT  9<L>  /  AlkPhos  146<H>  07-04    PT/INR - ( 04 Jul 2023 05:14 )   PT: 15.9 sec;   INR: 1.33          PTT - ( 04 Jul 2023 05:14 )  PTT:29.4 sec  Urinalysis Basic - ( 04 Jul 2023 05:14 )    Color: x / Appearance: x / SG: x / pH: x  Gluc: 108 mg/dL / Ketone: x  / Bili: x / Urobili: x   Blood: x / Protein: x / Nitrite: x   Leuk Esterase: x / RBC: x / WBC x   Sq Epi: x / Non Sq Epi: x / Bacteria: x      CAPILLARY BLOOD GLUCOSE      POCT Blood Glucose.: 87 mg/dL (04 Jul 2023 11:11)  POCT Blood Glucose.: 99 mg/dL (03 Jul 2023 22:58)  POCT Blood Glucose.: 115 mg/dL (03 Jul 2023 18:08)    LIVER FUNCTIONS - ( 04 Jul 2023 05:14 )  Alb: 2.0 g/dL / Pro: 5.3 g/dL / ALK PHOS: 146 U/L / ALT: 9 U/L / AST: 19 U/L / GGT: x             Cultures:Culture Results:   Culture in progress (07-03 @ 12:13)      RADIOLOGY & ADDITIONAL STUDIES:

## 2023-07-05 LAB
-  AZTREONAM: SIGNIFICANT CHANGE UP
-  CEFEPIME: SIGNIFICANT CHANGE UP
-  CIPROFLOXACIN: SIGNIFICANT CHANGE UP
-  LEVOFLOXACIN: SIGNIFICANT CHANGE UP
-  PIPERACILLIN/TAZOBACTAM: SIGNIFICANT CHANGE UP
-  TOBRAMYCIN: SIGNIFICANT CHANGE UP
ANION GAP SERPL CALC-SCNC: 9 MMOL/L — SIGNIFICANT CHANGE UP (ref 5–17)
BASE EXCESS BLDA CALC-SCNC: -3.6 MMOL/L — LOW (ref -2–3)
BUN SERPL-MCNC: 15 MG/DL — SIGNIFICANT CHANGE UP (ref 7–23)
CALCIUM SERPL-MCNC: 7.8 MG/DL — LOW (ref 8.4–10.5)
CHLORIDE SERPL-SCNC: 107 MMOL/L — SIGNIFICANT CHANGE UP (ref 96–108)
CO2 BLDA-SCNC: 23 MMOL/L — SIGNIFICANT CHANGE UP (ref 19–24)
CO2 SERPL-SCNC: 20 MMOL/L — LOW (ref 22–31)
CREAT SERPL-MCNC: 0.65 MG/DL — SIGNIFICANT CHANGE UP (ref 0.5–1.3)
CULTURE RESULTS: SIGNIFICANT CHANGE UP
CYSTATIN C SERPL-MCNC: 1.7 MG/L — HIGH (ref 0.72–1.32)
EGFR: 112 ML/MIN/1.73M2 — SIGNIFICANT CHANGE UP
GFR/BSA.PRED SERPLBLD CYS-BASED-ARV: 37 ML/MIN/1.73M2 — LOW
GFR/BSA.PRED SERPLBLD CYS-BASED-ARV: 39 ML/MIN/1.73M2 — LOW
GLUCOSE BLDC GLUCOMTR-MCNC: 102 MG/DL — HIGH (ref 70–99)
GLUCOSE BLDC GLUCOMTR-MCNC: 94 MG/DL — SIGNIFICANT CHANGE UP (ref 70–99)
GLUCOSE BLDC GLUCOMTR-MCNC: 96 MG/DL — SIGNIFICANT CHANGE UP (ref 70–99)
GLUCOSE SERPL-MCNC: 98 MG/DL — SIGNIFICANT CHANGE UP (ref 70–99)
HCO3 BLDA-SCNC: 22 MMOL/L — SIGNIFICANT CHANGE UP (ref 21–28)
HCT VFR BLD CALC: 23 % — LOW (ref 39–50)
HGB BLD-MCNC: 7.4 G/DL — LOW (ref 13–17)
MAGNESIUM SERPL-MCNC: 1.9 MG/DL — SIGNIFICANT CHANGE UP (ref 1.6–2.6)
MCHC RBC-ENTMCNC: 30.1 PG — SIGNIFICANT CHANGE UP (ref 27–34)
MCHC RBC-ENTMCNC: 32.2 GM/DL — SIGNIFICANT CHANGE UP (ref 32–36)
MCV RBC AUTO: 93.5 FL — SIGNIFICANT CHANGE UP (ref 80–100)
METHOD TYPE: SIGNIFICANT CHANGE UP
NRBC # BLD: 0 /100 WBCS — SIGNIFICANT CHANGE UP (ref 0–0)
ORGANISM # SPEC MICROSCOPIC CNT: SIGNIFICANT CHANGE UP
ORGANISM # SPEC MICROSCOPIC CNT: SIGNIFICANT CHANGE UP
PCO2 BLDA: 38 MMHG — SIGNIFICANT CHANGE UP (ref 35–48)
PH BLDA: 7.36 — SIGNIFICANT CHANGE UP (ref 7.35–7.45)
PHOSPHATE SERPL-MCNC: 4.7 MG/DL — HIGH (ref 2.5–4.5)
PLATELET # BLD AUTO: 404 K/UL — HIGH (ref 150–400)
PO2 BLDA: 67 MMHG — LOW (ref 83–108)
POTASSIUM SERPL-MCNC: 4.1 MMOL/L — SIGNIFICANT CHANGE UP (ref 3.5–5.3)
POTASSIUM SERPL-SCNC: 4.1 MMOL/L — SIGNIFICANT CHANGE UP (ref 3.5–5.3)
RBC # BLD: 2.46 M/UL — LOW (ref 4.2–5.8)
RBC # FLD: 17.3 % — HIGH (ref 10.3–14.5)
SAO2 % BLDA: 95.5 % — SIGNIFICANT CHANGE UP (ref 94–98)
SODIUM SERPL-SCNC: 136 MMOL/L — SIGNIFICANT CHANGE UP (ref 135–145)
SPECIMEN SOURCE: SIGNIFICANT CHANGE UP
VANCOMYCIN TROUGH SERPL-MCNC: 16.8 UG/ML — SIGNIFICANT CHANGE UP (ref 10–20)
VANCOMYCIN TROUGH SERPL-MCNC: 20.1 UG/ML — HIGH (ref 10–20)
WBC # BLD: 17.49 K/UL — HIGH (ref 3.8–10.5)
WBC # FLD AUTO: 17.49 K/UL — HIGH (ref 3.8–10.5)

## 2023-07-05 PROCEDURE — 99233 SBSQ HOSP IP/OBS HIGH 50: CPT | Mod: GC

## 2023-07-05 PROCEDURE — 99291 CRITICAL CARE FIRST HOUR: CPT | Mod: GC

## 2023-07-05 PROCEDURE — 71045 X-RAY EXAM CHEST 1 VIEW: CPT | Mod: 26

## 2023-07-05 PROCEDURE — 99232 SBSQ HOSP IP/OBS MODERATE 35: CPT

## 2023-07-05 RX ORDER — LOPERAMIDE HCL 2 MG
2 TABLET ORAL EVERY 6 HOURS
Refills: 0 | Status: DISCONTINUED | OUTPATIENT
Start: 2023-07-05 | End: 2023-07-08

## 2023-07-05 RX ORDER — FUROSEMIDE 40 MG
40 TABLET ORAL ONCE
Refills: 0 | Status: COMPLETED | OUTPATIENT
Start: 2023-07-05 | End: 2023-07-05

## 2023-07-05 RX ORDER — VANCOMYCIN HCL 1 G
1000 VIAL (EA) INTRAVENOUS ONCE
Refills: 0 | Status: COMPLETED | OUTPATIENT
Start: 2023-07-05 | End: 2023-07-05

## 2023-07-05 RX ORDER — VANCOMYCIN HCL 1 G
1000 VIAL (EA) INTRAVENOUS EVERY 12 HOURS
Refills: 0 | Status: DISCONTINUED | OUTPATIENT
Start: 2023-07-05 | End: 2023-07-05

## 2023-07-05 RX ORDER — ACETAMINOPHEN 500 MG
975 TABLET ORAL EVERY 6 HOURS
Refills: 0 | Status: DISCONTINUED | OUTPATIENT
Start: 2023-07-05 | End: 2023-07-12

## 2023-07-05 RX ORDER — PIPERACILLIN AND TAZOBACTAM 4; .5 G/20ML; G/20ML
4.5 INJECTION, POWDER, LYOPHILIZED, FOR SOLUTION INTRAVENOUS EVERY 8 HOURS
Refills: 0 | Status: DISCONTINUED | OUTPATIENT
Start: 2023-07-06 | End: 2023-07-06

## 2023-07-05 RX ADMIN — OXYCODONE HYDROCHLORIDE 5 MILLIGRAM(S): 5 TABLET ORAL at 13:27

## 2023-07-05 RX ADMIN — CHLORHEXIDINE GLUCONATE 15 MILLILITER(S): 213 SOLUTION TOPICAL at 18:06

## 2023-07-05 RX ADMIN — Medication 2 MILLIGRAM(S): at 14:19

## 2023-07-05 RX ADMIN — Medication 40 MILLIGRAM(S): at 09:25

## 2023-07-05 RX ADMIN — CHLORHEXIDINE GLUCONATE 1 APPLICATION(S): 213 SOLUTION TOPICAL at 04:00

## 2023-07-05 RX ADMIN — Medication 975 MILLIGRAM(S): at 18:03

## 2023-07-05 RX ADMIN — OXYCODONE HYDROCHLORIDE 5 MILLIGRAM(S): 5 TABLET ORAL at 12:27

## 2023-07-05 RX ADMIN — Medication 3 MILLILITER(S): at 22:34

## 2023-07-05 RX ADMIN — HEPARIN SODIUM 5000 UNIT(S): 5000 INJECTION INTRAVENOUS; SUBCUTANEOUS at 21:46

## 2023-07-05 RX ADMIN — SODIUM CHLORIDE 4 MILLILITER(S): 9 INJECTION INTRAMUSCULAR; INTRAVENOUS; SUBCUTANEOUS at 17:20

## 2023-07-05 RX ADMIN — CHLORHEXIDINE GLUCONATE 15 MILLILITER(S): 213 SOLUTION TOPICAL at 06:48

## 2023-07-05 RX ADMIN — Medication 15 MILLILITER(S): at 12:43

## 2023-07-05 RX ADMIN — Medication 2 MILLIGRAM(S): at 19:43

## 2023-07-05 RX ADMIN — Medication 2 MILLIGRAM(S): at 06:47

## 2023-07-05 RX ADMIN — Medication 3 MILLILITER(S): at 09:39

## 2023-07-05 RX ADMIN — OXYCODONE HYDROCHLORIDE 5 MILLIGRAM(S): 5 TABLET ORAL at 19:58

## 2023-07-05 RX ADMIN — PROPOFOL 5.46 MICROGRAM(S)/KG/MIN: 10 INJECTION, EMULSION INTRAVENOUS at 21:57

## 2023-07-05 RX ADMIN — MEROPENEM 100 MILLIGRAM(S): 1 INJECTION INTRAVENOUS at 16:36

## 2023-07-05 RX ADMIN — SODIUM CHLORIDE 4 MILLILITER(S): 9 INJECTION INTRAMUSCULAR; INTRAVENOUS; SUBCUTANEOUS at 22:34

## 2023-07-05 RX ADMIN — Medication 975 MILLIGRAM(S): at 13:27

## 2023-07-05 RX ADMIN — Medication 81 MILLIGRAM(S): at 12:27

## 2023-07-05 RX ADMIN — Medication 250 MILLIGRAM(S): at 06:46

## 2023-07-05 RX ADMIN — Medication 3 MILLILITER(S): at 17:20

## 2023-07-05 RX ADMIN — PIPERACILLIN AND TAZOBACTAM 25 GRAM(S): 4; .5 INJECTION, POWDER, LYOPHILIZED, FOR SOLUTION INTRAVENOUS at 23:47

## 2023-07-05 RX ADMIN — Medication 975 MILLIGRAM(S): at 19:03

## 2023-07-05 RX ADMIN — Medication 3 MILLILITER(S): at 05:53

## 2023-07-05 RX ADMIN — PROPOFOL 5.46 MICROGRAM(S)/KG/MIN: 10 INJECTION, EMULSION INTRAVENOUS at 05:46

## 2023-07-05 RX ADMIN — OXYCODONE HYDROCHLORIDE 5 MILLIGRAM(S): 5 TABLET ORAL at 23:47

## 2023-07-05 RX ADMIN — HEPARIN SODIUM 5000 UNIT(S): 5000 INJECTION INTRAVENOUS; SUBCUTANEOUS at 14:18

## 2023-07-05 RX ADMIN — OXYCODONE HYDROCHLORIDE 5 MILLIGRAM(S): 5 TABLET ORAL at 20:13

## 2023-07-05 RX ADMIN — LACOSAMIDE 200 MILLIGRAM(S): 50 TABLET ORAL at 18:07

## 2023-07-05 RX ADMIN — Medication 975 MILLIGRAM(S): at 12:27

## 2023-07-05 RX ADMIN — MEROPENEM 100 MILLIGRAM(S): 1 INJECTION INTRAVENOUS at 07:02

## 2023-07-05 RX ADMIN — Medication 1 DROP(S): at 18:06

## 2023-07-05 RX ADMIN — OXYCODONE HYDROCHLORIDE 5 MILLIGRAM(S): 5 TABLET ORAL at 07:02

## 2023-07-05 RX ADMIN — PANTOPRAZOLE SODIUM 40 MILLIGRAM(S): 20 TABLET, DELAYED RELEASE ORAL at 12:28

## 2023-07-05 RX ADMIN — Medication 975 MILLIGRAM(S): at 23:47

## 2023-07-05 RX ADMIN — LACOSAMIDE 200 MILLIGRAM(S): 50 TABLET ORAL at 06:48

## 2023-07-05 RX ADMIN — OXYCODONE HYDROCHLORIDE 5 MILLIGRAM(S): 5 TABLET ORAL at 04:00

## 2023-07-05 RX ADMIN — OXYCODONE HYDROCHLORIDE 5 MILLIGRAM(S): 5 TABLET ORAL at 16:36

## 2023-07-05 RX ADMIN — PROPOFOL 5.46 MICROGRAM(S)/KG/MIN: 10 INJECTION, EMULSION INTRAVENOUS at 14:30

## 2023-07-05 RX ADMIN — OXYCODONE HYDROCHLORIDE 5 MILLIGRAM(S): 5 TABLET ORAL at 07:21

## 2023-07-05 RX ADMIN — OXYCODONE HYDROCHLORIDE 5 MILLIGRAM(S): 5 TABLET ORAL at 03:10

## 2023-07-05 RX ADMIN — SODIUM CHLORIDE 4 MILLILITER(S): 9 INJECTION INTRAMUSCULAR; INTRAVENOUS; SUBCUTANEOUS at 09:38

## 2023-07-05 RX ADMIN — Medication 1 DROP(S): at 07:21

## 2023-07-05 RX ADMIN — OXYCODONE HYDROCHLORIDE 5 MILLIGRAM(S): 5 TABLET ORAL at 00:07

## 2023-07-05 RX ADMIN — HEPARIN SODIUM 5000 UNIT(S): 5000 INJECTION INTRAVENOUS; SUBCUTANEOUS at 06:48

## 2023-07-05 RX ADMIN — Medication 2 MILLIGRAM(S): at 23:48

## 2023-07-05 RX ADMIN — SODIUM CHLORIDE 4 MILLILITER(S): 9 INJECTION INTRAMUSCULAR; INTRAVENOUS; SUBCUTANEOUS at 05:52

## 2023-07-05 RX ADMIN — Medication 975 MILLIGRAM(S): at 06:49

## 2023-07-05 RX ADMIN — OXYCODONE HYDROCHLORIDE 5 MILLIGRAM(S): 5 TABLET ORAL at 17:36

## 2023-07-05 NOTE — PROCEDURE NOTE - NSCOMPLICATION_GEN_A_CORE
no complications

## 2023-07-05 NOTE — PROGRESS NOTE ADULT - SUBJECTIVE AND OBJECTIVE BOX
PULMONARY CONSULT SERVICE FOLLOW-UP NOTE    INTERVAL HPI:  Reviewed chart and overnight events; patient seen and examined at bedside. On CPAP. Comfortable. Awakens to touch.    MEDICATIONS:  Pulmonary:  albuterol/ipratropium for Nebulization 3 milliLiter(s) Nebulizer every 6 hours  sodium chloride 3%  Inhalation 4 milliLiter(s) Inhalation every 6 hours    Antimicrobials:  meropenem  IVPB 1000 milliGRAM(s) IV Intermittent every 8 hours    Anticoagulants:  aspirin  chewable 81 milliGRAM(s) Oral daily  heparin   Injectable 5000 Unit(s) SubCutaneous every 8 hours    Allergies  No Known Allergies    Vital Signs Last 24 Hrs  T(C): 38 (05 Jul 2023 01:24), Max: 38 (05 Jul 2023 00:00)  T(F): 100.4 (05 Jul 2023 01:24), Max: 100.4 (05 Jul 2023 00:00)  HR: 118 (05 Jul 2023 08:00) (99 - 121)  BP: 127/72 (05 Jul 2023 08:00) (94/58 - 142/80)  BP(mean): 93 (05 Jul 2023 08:00) (70 - 107)  RR: 24 (05 Jul 2023 08:00) (20 - 28)  SpO2: 93% (05 Jul 2023 08:00) (93% - 100%)    Parameters below as of 05 Jul 2023 07:00  Patient On (Oxygen Delivery Method): ventilator    O2 Concentration (%): 40  07-04 @ 07:01  -  07-05 @ 07:00  --------------------------------------------------------  IN: 2793 mL / OUT: 2345 mL / NET: 448 mL    07-05 @ 07:01  -  07-05 @ 08:26  --------------------------------------------------------  IN: 82.5 mL / OUT: 35 mL / NET: 47.5 mL    Mode: CPAP with PS  FiO2: 40  PEEP: 5  PS: 5  MAP: 6  PIP: 10    PHYSICAL EXAM:  Constitutional: thin man, intubated, comfortable  HEENT: NC/AT; anicteric sclera; MMM  Cardiovascular: +S1/S2, RRR  Respiratory: bilateral crackles; no W/R/R  Gastrointestinal: soft, NT/ND  Extremities: WWP; no clubbing or cyanosis; 2+ pitting edema to knees  Vascular: 2+ radial and pedal pulses  Neurological: AAOx3; no focal deficits    LABS:  ABG - ( 05 Jul 2023 05:22 )  pH, Arterial: 7.36  pH, Blood: x     /  pCO2: 38    /  pO2: 67    / HCO3: 22    / Base Excess: -3.6  /  SaO2: 95.5      CBC Full  -  ( 05 Jul 2023 05:21 )  WBC Count : 17.49 K/uL  RBC Count : 2.46 M/uL  Hemoglobin : 7.4 g/dL  Hematocrit : 23.0 %  Platelet Count - Automated : 404 K/uL  Mean Cell Volume : 93.5 fl  Mean Cell Hemoglobin : 30.1 pg  Mean Cell Hemoglobin Concentration : 32.2 gm/dL    07-05    136  |  107  |  15  ----------------------------<  98  4.1   |  20<L>  |  0.65    Ca    7.8<L>      05 Jul 2023 05:21  Phos  4.7     07-05  Mg     1.9     07-05    TPro  5.3<L>  /  Alb  2.0<L>  /  TBili  0.4  /  DBili  x   /  AST  19  /  ALT  9<L>  /  AlkPhos  146<H>  07-04    PT/INR - ( 04 Jul 2023 05:14 )   PT: 15.9 sec;   INR: 1.33          PTT - ( 04 Jul 2023 05:14 )  PTT:29.4 sec      Urinalysis Basic - ( 05 Jul 2023 05:21 )    Color: x / Appearance: x / SG: x / pH: x  Gluc: 98 mg/dL / Ketone: x  / Bili: x / Urobili: x   Blood: x / Protein: x / Nitrite: x   Leuk Esterase: x / RBC: x / WBC x   Sq Epi: x / Non Sq Epi: x / Bacteria: x    RADIOLOGY & ADDITIONAL STUDIES: Reviewed.

## 2023-07-05 NOTE — PROCEDURE NOTE - NSINDICATIONS_GEN_A_CORE
critical illness/dialysis/CRRT
emergency venous access/fluid administration
venous access
critical patient
airway protection
antibiotic therapy/emergency venous access/fluid administration
hemodynamic monitoring
critical illness
pleural effusion
arterial puncture to obtain ABG's/blood sampling/critical patient
critical illness
critical illness/emergency venous access/hypertonic/irritant infusion
emergency venous access
critical illness/hypertonic/irritant infusion
emergency venous access
critical illness

## 2023-07-05 NOTE — PROGRESS NOTE ADULT - ASSESSMENT
53M w PMHx of HTN, type A aortic dissection s/p Dacron grafts, AV resuspension in 2013, CAD s/p CABG x 1 SVG to RCA (5/2013 with Dr. Medina), seizure disorder, recent admission 3/20-4/14 for replacement of transverse aortic arch, second stage TEVAR and aorto-axillary bypass, AV replacement (bio 23mm), and CABG x 1 (SVG-RCA). Pt found to have endo leak and taken to OR for TEVAR revision on 5/5. Post op course c/b Klebsiella bacteremia (5/15), resp failure requiring intubation on 5/18, Mucus plugging s/p Bronch 5/19 and PEA arrest. Post operatively pt also found to have hemorrhagic pancreatitis with venu-pancreatic abscess possibly 2* to Depakote therefore s/p IR aspiration of peripancreatic fluid collection and paracentesis on 5/22, IR venu-pancreatic abscess drainage and placement of drainage catheter on 5/24. Pt then transferred from CTICU to SICU for septic shock, and further mgmt of hemorrhagic pancreatitis on 5/25. s/p IR placement of 2 new drainage catheters and catheter exchange on 5/26. LUQ drainage 5/30. OR 5/31 for exlap washout & replacement of 3 new JPs and abthera vac with open abdomen. RTOR 6/1, no bleeding, evac of old blood, placement of feeding J-tube, failed extubation due to PNA--now completed ABX course ans s/p trach 6/5 and decannulation 6/28. Transferred to SICU (7/2) for respiratory distress. Emergently intubated (7/3) for aspiration pneumonia.     Plan   - Follow ID recommendations for antibiotics to cover pancreatic necrosis   - Wean off sedation to extubate   - Team 1 will follow

## 2023-07-05 NOTE — PROCEDURE NOTE - NSINFORMCONSENT_GEN_A_CORE
Benefits, risks, and possible complications of procedure explained to patient/caregiver who verbalized understanding and gave verbal consent.
Benefits, risks, and possible complications of procedure explained to patient/caregiver who verbalized understanding and gave verbal consent.
Benefits, risks, and possible complications of procedure explained to patient/caregiver who verbalized understanding and gave written consent.
This was an emergent procedure.
over the phone with wife/Benefits, risks, and possible complications of procedure explained to patient/caregiver who verbalized understanding and gave verbal consent.
Pt intubated/This was an emergent procedure.
Benefits, risks, and possible complications of procedure explained to patient/caregiver who verbalized understanding and gave written consent.
Benefits, risks, and possible complications of procedure explained to patient/caregiver who verbalized understanding and gave verbal consent.
Benefits, risks, and possible complications of procedure explained to patient/caregiver who verbalized understanding and gave verbal consent.
I called wife and no answer, patient tachypneic and in distress, thus procedure performed emergently/This was an emergent procedure.
This was an emergent procedure.
Benefits, risks, and possible complications of procedure explained to patient/caregiver who verbalized understanding and gave verbal consent.
This was an emergent procedure.
Benefits, risks, and possible complications of procedure explained to patient/caregiver who verbalized understanding and gave written consent.
This was an emergent procedure.
Benefits, risks, and possible complications of procedure explained to patient/caregiver who verbalized understanding and gave verbal consent.
Benefits, risks, and possible complications of procedure explained to patient/caregiver who verbalized understanding and gave written consent.
Benefits, risks, and possible complications of procedure explained to patient/caregiver who verbalized understanding and gave verbal consent.
This was an emergent procedure.
Benefits, risks, and possible complications of procedure explained to patient/caregiver who verbalized understanding and gave written consent.
This was an emergent procedure.

## 2023-07-05 NOTE — PROGRESS NOTE ADULT - ASSESSMENT
53M w PMHx of HTN, type A aortic dissection s/p Dacron grafts, AV resuspension in 2013, CAD s/p CABG x 1 SVG to RCA (5/2013 with Dr. Medina), seizure disorder, recent admission 3/20-4/14 for replacement of transverse aortic arch, second stage TEVAR and aorto-axillary bypass, AV replacement (bio 23mm), and CABG x 1 (SVG-RCA). Pt found to have endo leak and taken to OR for TEVAR revision on 5/5. Post op course c/b Klebsiella bacteremia (5/15), resp failure requiring intubation on 5/18, Mucus plugging s/p Bronch 5/19 and PEA arrest. Post operatively pt also found to have hemorrhagic pancreatitis with venu-pancreatic abscess possibly 2* to Depakote therefore s/p IR aspiration of peripancreatic fluid collection and paracentesis on 5/22, IR venu-pancreatic abscess drainage and placement of drainage catheter on 5/24. Pt then transferred from CTICU to SICU for septic shock, and further mgmt of hemorrhagic pancreatitis on 5/25. s/p IR placement of 2 new drainage catheters and catheter exchange on 5/26. LUQ drainage 5/30. OR 5/31 for exlap washout & replacement of 3 new JPs and abthera vac with open abdomen. RTOR 6/1, no bleeding, evac of old blood, placement of feeding J-tube, failed extubation due to PNA, completed ABX course and s/p trach 6/5 and decannulation 6/28. stepped down, but returned to SICU (7/2) for respiratory distress. Emergently intubated (7/3) for aspiration pneumonia.     NEURO: Sedation: Propfol gtt, standing OxyIR 5q4h w Dilaudid breakthrough, continue wean as tolerated. Hx seizures: cont vimpat. Avoid depakote due to drug-related pancreatitis risk.   HEENT: Artificial tears, Torticollis improved  CV: s/p PEA arrest on 5/24 night. TTE (6/14) LVEF 75%, LVH, biatrial enlargement and elevated PA pressure (elevated from previous), mild to mod MR/TR . Cont ASA 81mg. Metoprolol stopped due to low BP after starting propofol. total body fluid overloaded, cont diurese with lasix for goal net negative I&O.   PULM: Prior ARDS/PNA resolved and trach decannulated 6/28. Acute hypoxic respiratory failure, secondary to R side aspiration PNA, emergently intubated (7/3), sputum cx grew pseudomonas, restarted meropenem with empiric vanco per ID. follow vanc levels. cont Duonebs, 3% saline nebs, doing CPAP 5/5/40% trial.   GI/FEN: TF Vital 1.0 at 70cc via feeding J-tube with imodium 2mg TID, MTV, Protonix. Hemorrhagic pancreatitis: s/p multiple drain placements and paracentisis by IR team. Cdiff negative (6/19).  : acute renal failure- been off CVVHD since mid June with renal recovery. coulter for strict I&O  HEME: Acute blood loss anemia: hg stable.   ENDO: mISS  ID: Sepsis without shock, pneumonia, Pseudomonas in Sputum: Meropenem(7/2--) and Vanc (7/2--)per ID,  - PRIOR ABX: Ertapenem (6/13-6/16, 6/18-6/23), meropenem (6/9-6/13), vanco (6/9, 6/18-6/22), caspofungin (6/9-6/12, 6/18-6/21), completed Ceftriaxone( 6/5- 6/15) Chloe (5/21-6/5),  Caspo (5/23-5/30, 6/18-20), Ampicillin (5/21- 5/27).   - Prior Cultures: Kleb bacteremia from 5/15 & 5/17. bronch cx kleb, enterobacter (zosyn, erta resistant), E faecalis, strep angionosus from 5/19, pancreatic abscess cx pan-sensitive kleb 5/22.   PPX: SCDs, SQH  LINES: PIVs // L rad kalpana (5/31-6/15), 5Fr PIV in LUE (6/13-15), Lsubclav HD Cath (5/31-6/12), RIJ TLC (5/22-5/27), RIJ HD cath (5/22-31), R Ax kalpana(5/12-5/31), [facility]J TLC (5/23-6/1), RIJ TLC (6/1-13)   WOUNDS/DRAINS: CODIE removed. midline incision healed.   PT: will request again when pt off propofol awake alert able to participate.   Dispo: SICU

## 2023-07-05 NOTE — PROGRESS NOTE ADULT - SUBJECTIVE AND OBJECTIVE BOX
S: No new issues/events overnight, no new med c/o    O: ICU Vital Signs Last 24 Hrs  T(F): 99.7 (07-05-23 @ 09:00), Max: 100.4 (07-05-23 @ 00:00)  HR: 115 (07-05-23 @ 10:00) (99 - 121)  BP: 119/70 (07-05-23 @ 10:00) (94/58 - 139/73)  BP(mean): 89 (07-05-23 @ 10:00) (70 - 102)  ABP: --  RR: 31 (07-05-23 @ 10:00) (20 - 31)  SpO2: 91% (07-05-23 @ 10:00) (91% - 100%)    PHYSICAL EXAM:   Neurological: lightly sedated with propofol. arousible easily by voice. strength 5/5 b/l  ENT: mucus membrane moist  Cardiovascular: RRR  Respiratory: CTA  Gastrointestinal: soft, NT, ND, BS+, midline incision healing, no erythema, no bleeding, no pus.   Extremities: warm, 2+ dependent edema  Vascular: no cyanosis/erythema  Skin: no rashes  MSK: no joint swelling.     LABS:    07-05    136  |  107  |  15  ----------------------------<  98  4.1   |  20<L>  |  0.65    Ca    7.8<L>      05 Jul 2023 05:21  Phos  4.7     07-05  Mg     1.9     07-05    TPro  5.3<L>  /  Alb  2.0<L>  /  TBili  0.4  /  DBili  x   /  AST  19  /  ALT  9<L>  /  AlkPhos  146<H>  07-04  LIVER FUNCTIONS - ( 04 Jul 2023 05:14 )  Alb: 2.0 g/dL / Pro: 5.3 g/dL / ALK PHOS: 146 U/L / ALT: 9 U/L / AST: 19 U/L / GGT: x                               7.4    17.49 )-----------( 404      ( 05 Jul 2023 05:21 )             23.0   PT/INR - ( 04 Jul 2023 05:14 )   PT: 15.9 sec;   INR: 1.33          PTT - ( 04 Jul 2023 05:14 )  PTT:29.4 sec  ABG - ( 05 Jul 2023 05:22 )  pH, Arterial: 7.36  pH, Blood: x     /  pCO2: 38    /  pO2: 67    / HCO3: 22    / Base Excess: -3.6  /  SaO2: 95.5            Urinalysis Basic - ( 05 Jul 2023 05:21 )    Color: x / Appearance: x / SG: x / pH: x  Gluc: 98 mg/dL / Ketone: x  / Bili: x / Urobili: x   Blood: x / Protein: x / Nitrite: x   Leuk Esterase: x / RBC: x / WBC x   Sq Epi: x / Non Sq Epi: x / Bacteria: x    CAPILLARY BLOOD GLUCOSE      POCT Blood Glucose.: 96 mg/dL (05 Jul 2023 10:56)  POCT Blood Glucose.: 84 mg/dL (04 Jul 2023 23:26)  POCT Blood Glucose.: 91 mg/dL (04 Jul 2023 16:50)  POCT Blood Glucose.: 87 mg/dL (04 Jul 2023 11:11)    MEDICATIONS  (STANDING):  acetaminophen     Tablet .. 975 milliGRAM(s) Oral every 6 hours  albuterol/ipratropium for Nebulization 3 milliLiter(s) Nebulizer every 6 hours  artificial  tears Solution 1 Drop(s) Both EYES two times a day  aspirin  chewable 81 milliGRAM(s) Oral daily  chlorhexidine 0.12% Liquid 15 milliLiter(s) Oral Mucosa every 12 hours  chlorhexidine 2% Cloths 1 Application(s) Topical daily  glucagon  Injectable 1 milliGRAM(s) IntraMuscular once  heparin   Injectable 5000 Unit(s) SubCutaneous every 8 hours  insulin lispro (ADMELOG) corrective regimen sliding scale   SubCutaneous every 6 hours  lacosamide Solution 200 milliGRAM(s) Oral two times a day  loperamide Liquid 2 milliGRAM(s) Enteral Tube every 8 hours  meropenem  IVPB 1000 milliGRAM(s) IV Intermittent every 8 hours  multivitamin/minerals/iron Oral Solution (CENTRUM) 15 milliLiter(s) Oral daily  oxyCODONE    Solution 5 milliGRAM(s) Oral every 4 hours  pantoprazole  Injectable 40 milliGRAM(s) IV Push daily  propofol Infusion 10 MICROgram(s)/kG/Min (5.46 mL/Hr) IV Continuous <Continuous>  sodium chloride 3%  Inhalation 4 milliLiter(s) Inhalation every 6 hours  vancomycin  IVPB 1000 milliGRAM(s) IV Intermittent every 12 hours    MEDICATIONS  (PRN):  dextrose Oral Gel 15 Gram(s) Oral once PRN Blood Glucose LESS THAN 70 milliGRAM(s)/deciliter  HYDROmorphone  Injectable 0.5 milliGRAM(s) IV Push every 4 hours PRN breakthrough pain  ondansetron Injectable 4 milliGRAM(s) IV Push every 8 hours PRN Nausea and/or Vomiting  sodium chloride 0.9% lock flush 10 milliLiter(s) IV Push every 1 hour PRN Pre/post blood products, medications, blood draw, and to maintain line patency      Cantrell:	  [ ] None	[x ] Daily Cantrell Order Placed	   Indication:	  [ x] Strict I and O's    [ ] Obstruction     [ ] Incontinence + Stage 3 or 4 Decubitus  Central Line:  [ ] None	   [ ]  Medication / TPN Administration     [ ] No Peripheral IV

## 2023-07-05 NOTE — PROCEDURE NOTE - PROCEDURE DATE TIME, MLM
13-May-2023 07:34
09-Jun-2023 18:07
17-May-2023 15:10
09-May-2023
18-May-2023
21-May-2023 17:00
05-Jun-2023 17:00
08-May-2023 13:23
18-May-2023
09-May-2023
23-May-2023
01-Jun-2023 16:30
02-Jul-2023 15:00
15-Vazquez-2023 00:02
20-May-2023 07:05
26-May-2023 15:30
09-May-2023
13-Jun-2023 16:58
17-Jun-2023 16:32
17-Jun-2023 16:41
05-May-2023 12:36
22-May-2023
22-May-2023
13-May-2023 02:54

## 2023-07-05 NOTE — PROGRESS NOTE ADULT - SUBJECTIVE AND OBJECTIVE BOX
SUBJECTIVE: Patient seen at bedside during morning rounds. Patient sedated, wakes up if stimulated, on CPAP     MEDICATIONS  (STANDING):  acetaminophen     Tablet .. 975 milliGRAM(s) Oral every 6 hours  albuterol/ipratropium for Nebulization 3 milliLiter(s) Nebulizer every 6 hours  artificial  tears Solution 1 Drop(s) Both EYES two times a day  aspirin  chewable 81 milliGRAM(s) Oral daily  chlorhexidine 0.12% Liquid 15 milliLiter(s) Oral Mucosa every 12 hours  chlorhexidine 2% Cloths 1 Application(s) Topical daily  dextrose 5%. 1000 milliLiter(s) (100 mL/Hr) IV Continuous <Continuous>  dextrose 50% Injectable 25 Gram(s) IV Push once  glucagon  Injectable 1 milliGRAM(s) IntraMuscular once  heparin   Injectable 5000 Unit(s) SubCutaneous every 8 hours  insulin lispro (ADMELOG) corrective regimen sliding scale   SubCutaneous every 6 hours  lacosamide Solution 200 milliGRAM(s) Oral two times a day  loperamide Liquid 2 milliGRAM(s) Enteral Tube every 8 hours  meropenem  IVPB 1000 milliGRAM(s) IV Intermittent every 8 hours  multivitamin/minerals/iron Oral Solution (CENTRUM) 15 milliLiter(s) Oral daily  oxyCODONE    Solution 5 milliGRAM(s) Oral every 4 hours  pantoprazole  Injectable 40 milliGRAM(s) IV Push daily  propofol Infusion 10 MICROgram(s)/kG/Min (5.46 mL/Hr) IV Continuous <Continuous>  sodium chloride 3%  Inhalation 4 milliLiter(s) Inhalation every 6 hours  vancomycin  IVPB 1000 milliGRAM(s) IV Intermittent every 12 hours    MEDICATIONS  (PRN):  dextrose Oral Gel 15 Gram(s) Oral once PRN Blood Glucose LESS THAN 70 milliGRAM(s)/deciliter  HYDROmorphone  Injectable 0.5 milliGRAM(s) IV Push every 4 hours PRN breakthrough pain  ondansetron Injectable 4 milliGRAM(s) IV Push every 8 hours PRN Nausea and/or Vomiting  sodium chloride 0.9% lock flush 10 milliLiter(s) IV Push every 1 hour PRN Pre/post blood products, medications, blood draw, and to maintain line patency      Drips:     ICU Vital Signs Last 24 Hrs  T(C): 38 (05 Jul 2023 12:27), Max: 38 (05 Jul 2023 00:00)  T(F): 100.4 (05 Jul 2023 12:27), Max: 100.4 (05 Jul 2023 00:00)  HR: 119 (05 Jul 2023 12:27) (99 - 121)  BP: 138/82 (05 Jul 2023 12:27) (95/62 - 139/73)  BP(mean): 104 (05 Jul 2023 12:27) (74 - 104)  ABP: --  ABP(mean): --  RR: 24 (05 Jul 2023 12:27) (20 - 31)  SpO2: 99% (05 Jul 2023 12:27) (91% - 100%)    O2 Parameters below as of 05 Jul 2023 12:27  Patient On (Oxygen Delivery Method): ventilator, cpap    O2 Concentration (%): 40        Physical Exam:  Neurological: lightly sedated with propofol. arousible easily by voice. strength 5/5 b/l  ENT: mucus membrane moist  Cardiovascular: RRR  Respiratory: bilateral crackles  Gastrointestinal: soft, NT, ND, BS+, midline incision healing, no erythema, no bleeding, no pus.   Extremities: warm, 2+ dependent edema  Vascular: no cyanosis/erythema  Skin: no rashes  MSK: no joint swelling.      Vent settings:  Mode: CPAP with PS, FiO2: 40, PEEP: 5, PS: 5, MAP: 6.6, PIP: 11    I&O's Summary    04 Jul 2023 07:01  -  05 Jul 2023 07:00  --------------------------------------------------------  IN: 2793 mL / OUT: 2345 mL / NET: 448 mL    05 Jul 2023 07:01  -  05 Jul 2023 13:02  --------------------------------------------------------  IN: 615 mL / OUT: 2200 mL / NET: -1585 mL        LABS:                        7.4    17.49 )-----------( 404      ( 05 Jul 2023 05:21 )             23.0     07-05    136  |  107  |  15  ----------------------------<  98  4.1   |  20<L>  |  0.65    Ca    7.8<L>      05 Jul 2023 05:21  Phos  4.7     07-05  Mg     1.9     07-05    TPro  5.3<L>  /  Alb  2.0<L>  /  TBili  0.4  /  DBili  x   /  AST  19  /  ALT  9<L>  /  AlkPhos  146<H>  07-04    PT/INR - ( 04 Jul 2023 05:14 )   PT: 15.9 sec;   INR: 1.33          PTT - ( 04 Jul 2023 05:14 )  PTT:29.4 sec  Urinalysis Basic - ( 05 Jul 2023 05:21 )    Color: x / Appearance: x / SG: x / pH: x  Gluc: 98 mg/dL / Ketone: x  / Bili: x / Urobili: x   Blood: x / Protein: x / Nitrite: x   Leuk Esterase: x / RBC: x / WBC x   Sq Epi: x / Non Sq Epi: x / Bacteria: x      CAPILLARY BLOOD GLUCOSE      POCT Blood Glucose.: 96 mg/dL (05 Jul 2023 10:56)  POCT Blood Glucose.: 84 mg/dL (04 Jul 2023 23:26)  POCT Blood Glucose.: 91 mg/dL (04 Jul 2023 16:50)    LIVER FUNCTIONS - ( 04 Jul 2023 05:14 )  Alb: 2.0 g/dL / Pro: 5.3 g/dL / ALK PHOS: 146 U/L / ALT: 9 U/L / AST: 19 U/L / GGT: x

## 2023-07-05 NOTE — PROCEDURE NOTE - PRACTITIONER PERFORMING THE TIME OUT
Jack
Mitchell VELAZQUEZ
Ana Hall PA-C
Venancio
Ana Hall
Zaira Fischer, APRN
Ritu Bourgeois NP
Ana Hall

## 2023-07-05 NOTE — PROGRESS NOTE ADULT - SUBJECTIVE AND OBJECTIVE BOX
INTERVAL HPI/OVERNIGHT EVENTS:    Patient was seen and examined at bedside. No complains     CONSTITUTIONAL:  Negative fever or chills, feels well, good appetite  EYES:  Negative  blurry vision or double vision  CARDIOVASCULAR:  Negative for chest pain or palpitations  RESPIRATORY:  Negative for cough, wheezing, or SOB   GASTROINTESTINAL:  Negative for nausea, vomiting, diarrhea, constipation, or abdominal pain  GENITOURINARY:  Negative frequency, urgency or dysuria  NEUROLOGIC:  No headache, confusion, dizziness, lightheadedness      ANTIBIOTICS/RELEVANT:    MEDICATIONS  (STANDING):  acetaminophen     Tablet .. 975 milliGRAM(s) Oral every 6 hours  albuterol/ipratropium for Nebulization 3 milliLiter(s) Nebulizer every 6 hours  artificial  tears Solution 1 Drop(s) Both EYES two times a day  aspirin  chewable 81 milliGRAM(s) Oral daily  chlorhexidine 0.12% Liquid 15 milliLiter(s) Oral Mucosa every 12 hours  chlorhexidine 2% Cloths 1 Application(s) Topical daily  dextrose 5%. 1000 milliLiter(s) (100 mL/Hr) IV Continuous <Continuous>  dextrose 50% Injectable 25 Gram(s) IV Push once  glucagon  Injectable 1 milliGRAM(s) IntraMuscular once  heparin   Injectable 5000 Unit(s) SubCutaneous every 8 hours  insulin lispro (ADMELOG) corrective regimen sliding scale   SubCutaneous every 6 hours  lacosamide Solution 200 milliGRAM(s) Oral two times a day  loperamide Liquid 2 milliGRAM(s) Enteral Tube every 8 hours  meropenem  IVPB 1000 milliGRAM(s) IV Intermittent every 8 hours  multivitamin/minerals/iron Oral Solution (CENTRUM) 15 milliLiter(s) Oral daily  oxyCODONE    Solution 5 milliGRAM(s) Oral every 4 hours  pantoprazole  Injectable 40 milliGRAM(s) IV Push daily  propofol Infusion 10 MICROgram(s)/kG/Min (5.46 mL/Hr) IV Continuous <Continuous>  sodium chloride 3%  Inhalation 4 milliLiter(s) Inhalation every 6 hours  vancomycin  IVPB 1000 milliGRAM(s) IV Intermittent every 12 hours    MEDICATIONS  (PRN):  dextrose Oral Gel 15 Gram(s) Oral once PRN Blood Glucose LESS THAN 70 milliGRAM(s)/deciliter  HYDROmorphone  Injectable 0.5 milliGRAM(s) IV Push every 4 hours PRN breakthrough pain  ondansetron Injectable 4 milliGRAM(s) IV Push every 8 hours PRN Nausea and/or Vomiting  sodium chloride 0.9% lock flush 10 milliLiter(s) IV Push every 1 hour PRN Pre/post blood products, medications, blood draw, and to maintain line patency        Vital Signs Last 24 Hrs  T(C): 38 (05 Jul 2023 12:27), Max: 38 (05 Jul 2023 00:00)  T(F): 100.4 (05 Jul 2023 12:27), Max: 100.4 (05 Jul 2023 00:00)  HR: 115 (05 Jul 2023 14:00) (105 - 121)  BP: 115/71 (05 Jul 2023 14:00) (95/62 - 139/73)  BP(mean): 87 (05 Jul 2023 14:00) (74 - 104)  RR: 24 (05 Jul 2023 14:00) (20 - 31)  SpO2: 99% (05 Jul 2023 14:00) (91% - 100%)    Parameters below as of 05 Jul 2023 14:00  Patient On (Oxygen Delivery Method): ventilator, CPAP    O2 Concentration (%): 40    PHYSICAL EXAM:  Constitutional: non-toxic, no distress  Eyes:IVAN, EOMI  Ear/Nose/Throat: no oral lesion, no sinus tenderness on percussion	  Neck:  supple  Respiratory: CTA marti  Cardiovascular: S1S2 RRR, no murmurs  Gastrointestinal:soft, (+) BS, no HSM  Extremities:no e/e/c  Vascular: DP Pulse:	right normal; left normal      LABS:                        7.4    17.49 )-----------( 404      ( 05 Jul 2023 05:21 )             23.0     07-05    136  |  107  |  15  ----------------------------<  98  4.1   |  20<L>  |  0.65    Ca    7.8<L>      05 Jul 2023 05:21  Phos  4.7     07-05  Mg     1.9     07-05    TPro  5.3<L>  /  Alb  2.0<L>  /  TBili  0.4  /  DBili  x   /  AST  19  /  ALT  9<L>  /  AlkPhos  146<H>  07-04    PT/INR - ( 04 Jul 2023 05:14 )   PT: 15.9 sec;   INR: 1.33          PTT - ( 04 Jul 2023 05:14 )  PTT:29.4 sec  Urinalysis Basic - ( 05 Jul 2023 05:21 )    Color: x / Appearance: x / SG: x / pH: x  Gluc: 98 mg/dL / Ketone: x  / Bili: x / Urobili: x   Blood: x / Protein: x / Nitrite: x   Leuk Esterase: x / RBC: x / WBC x   Sq Epi: x / Non Sq Epi: x / Bacteria: x        MICROBIOLOGY:    Culture - Sputum . (07.03.23 @ 12:13)    -  Levofloxacin: S <=0.5   -  Piperacillin/Tazobactam: S <=8   -  Tobramycin: S <=2   Gram Stain:   No epithelial cells  Few WBC's  Rare Gram Positive Cocci in Clusters   -  Aztreonam: S <=4   -  Cefepime: S <=2   -  Ciprofloxacin: S <=0.25   Specimen Source: .Sputum Sputum   Culture Results:   Few Pseudomonas aeruginosa  Accompanied by normal respiratory shraddha   Organism Identification: Pseudomonas aeruginosa   Organism: Pseudomonas aeruginosa   Method Type: LIZETTE        RADIOLOGY & ADDITIONAL STUDIES:

## 2023-07-05 NOTE — PROCEDURE NOTE - NSANESTHESIA_GEN_A_CORE
1% lidocaine
5 CC/2% lidocaine
no anesthesia administered
1% lidocaine
no anesthesia administered
2% lidocaine
1% lidocaine
no anesthesia administered
1% lidocaine
1% lidocaine

## 2023-07-05 NOTE — PROCEDURE NOTE - NSPROCDETAILS_GEN_ALL_CORE
location identified, draped/prepped, sterile technique used/blood seen on insertion/dressing applied/flushes easily/secured in place/sterile technique, catheter placed
location identified, draped/prepped, sterile technique used/blood seen on insertion/dressing applied/flushes easily/secured in place/sterile technique, catheter placed
guidewire recovered/lumen(s) aspirated and flushed/sterile dressing applied/sterile technique, catheter placed/ultrasound guidance with use of sterile gel and probe cove
guidewire recovered/lumen(s) aspirated and flushed/sterile dressing applied/sterile technique, catheter placed/ultrasound guidance with use of sterile gel and probe cove
patient pre-oxygenated, tube inserted, placement confirmed
Seldinger technique/ultrasound assessment of fluid (location)
blood seen on insertion/dressing applied/flushes easily/secured in place
ultrasound guidance with use of sterile gel and probe cove
location identified, draped/prepped, sterile technique used, needle inserted/introduced/positive blood return obtained via catheter/connected to a pressurized flush line/sutured in place/hemostasis with direct pressure, dressing applied/Seldinger technique/all materials/supplies accounted for at end of procedure
location identified, draped/prepped, sterile technique used/blood seen on insertion/dressing applied/flushes easily/secured in place/sterile technique, catheter placed
ultrasound guidance with use of sterile gel and probe cove
guidewire recovered/lumen(s) aspirated and flushed/sterile dressing applied/sterile technique, catheter placed/ultrasound guidance with use of sterile gel and probe cove
guidewire recovered/lumen(s) aspirated and flushed/sterile dressing applied/sterile technique, catheter placed/ultrasound guidance with use of sterile gel and probe cove

## 2023-07-05 NOTE — PROCEDURE NOTE - NSPOSTCAREGUIDE_GEN_A_CORE
Verbal/written post procedure instructions were given to patient/caregiver/Care for catheter as per unit/ICU protocols
Keep the cast/splint/dressing clean and dry/Care for catheter as per unit/ICU protocols
Verbal/written post procedure instructions were given to patient/caregiver/Instructed patient/caregiver regarding signs and symptoms of infection
Care for catheter as per unit/ICU protocols

## 2023-07-05 NOTE — CHART NOTE - NSCHARTNOTEFT_GEN_A_CORE
Admitting Diagnosis:   Patient is a 53y old  Male who presents with a chief complaint of endoleak, abdominal pain (05 Jul 2023 11:00)      PAST MEDICAL & SURGICAL HISTORY:  HTN (hypertension)      Aortic dissection      CAD (coronary artery disease)      Seizure disorder      Seizure disorder      Hypertension      Status post endovascular aneurysm repair (EVAR)      S/P aortic bifurcation bypass graft      S/P CABG x 1          Current Nutrition Order: NPO; Vital 1.0 @70ml/hr x 24hrs; provides 1680ml total volume, 1680 kcals, 67.2g protein, 1401ml free water daily    PO Intake: Good (%) [   ]  Fair (50-75%) [   ] Poor (<25%) [   ]- N/A    GI Issues: nontender/nondistended, loose BM noted 7/2-7/4.    Pain: No pain/absence of nonverbal indicators of pain    Skin Integrity: stage II PUs to sacrum & L scapula, penis, skin tear to L ear, CODIE removed, midline incision healed    Labs:   07-05    136  |  107  |  15  ----------------------------<  98  4.1   |  20<L>  |  0.65    Ca    7.8<L>      05 Jul 2023 05:21  Phos  4.7     07-05  Mg     1.9     07-05    TPro  5.3<L>  /  Alb  2.0<L>  /  TBili  0.4  /  DBili  x   /  AST  19  /  ALT  9<L>  /  AlkPhos  146<H>  07-04    CAPILLARY BLOOD GLUCOSE      POCT Blood Glucose.: 96 mg/dL (05 Jul 2023 10:56)  POCT Blood Glucose.: 84 mg/dL (04 Jul 2023 23:26)  POCT Blood Glucose.: 91 mg/dL (04 Jul 2023 16:50)      Medications:  MEDICATIONS  (STANDING):  acetaminophen     Tablet .. 975 milliGRAM(s) Oral every 6 hours  albuterol/ipratropium for Nebulization 3 milliLiter(s) Nebulizer every 6 hours  artificial  tears Solution 1 Drop(s) Both EYES two times a day  aspirin  chewable 81 milliGRAM(s) Oral daily  chlorhexidine 0.12% Liquid 15 milliLiter(s) Oral Mucosa every 12 hours  chlorhexidine 2% Cloths 1 Application(s) Topical daily  dextrose 5%. 1000 milliLiter(s) (100 mL/Hr) IV Continuous <Continuous>  dextrose 50% Injectable 25 Gram(s) IV Push once  glucagon  Injectable 1 milliGRAM(s) IntraMuscular once  heparin   Injectable 5000 Unit(s) SubCutaneous every 8 hours  insulin lispro (ADMELOG) corrective regimen sliding scale   SubCutaneous every 6 hours  lacosamide Solution 200 milliGRAM(s) Oral two times a day  loperamide Liquid 2 milliGRAM(s) Enteral Tube every 8 hours  meropenem  IVPB 1000 milliGRAM(s) IV Intermittent every 8 hours  multivitamin/minerals/iron Oral Solution (CENTRUM) 15 milliLiter(s) Oral daily  oxyCODONE    Solution 5 milliGRAM(s) Oral every 4 hours  pantoprazole  Injectable 40 milliGRAM(s) IV Push daily  propofol Infusion 10 MICROgram(s)/kG/Min (5.46 mL/Hr) IV Continuous <Continuous>  sodium chloride 3%  Inhalation 4 milliLiter(s) Inhalation every 6 hours  vancomycin  IVPB 1000 milliGRAM(s) IV Intermittent every 12 hours    MEDICATIONS  (PRN):  dextrose Oral Gel 15 Gram(s) Oral once PRN Blood Glucose LESS THAN 70 milliGRAM(s)/deciliter  HYDROmorphone  Injectable 0.5 milliGRAM(s) IV Push every 4 hours PRN breakthrough pain  ondansetron Injectable 4 milliGRAM(s) IV Push every 8 hours PRN Nausea and/or Vomiting  sodium chloride 0.9% lock flush 10 milliLiter(s) IV Push every 1 hour PRN Pre/post blood products, medications, blood draw, and to maintain line patency      Weight: 91kg [10 Ghazala 2023]  Daily   90.9kg [7 June 2023]  Daily 83.3kg [4 July 2023]    Weight Change: 7.7kg/8.4% wt loss x 1 month- ? 2/2 fluid shifts/inadequate nutrition. Need reweight to confirm. Please obtain weekly weights to ensure adequacy of nutrition provision    Estimated energy needs:   Ideal body weight (80.9kg) used for calculations as pt >100% of IBW, BMI <30 per [facility] Standards of Care. Needs estimated for age and adjusted for current clinical status     Calories: 2022.5-2427 kcals based on 25-30 kcals/kg  Protein: 113.3-129.4g protein based on 1.4-1.6g protein/kg  *Fluid Needs per team    Subjective:     Chart reviewed. Discussed with IDT.   TMax: 38.0 C. HR trending high, BP WNL. MAPs >65 mmHg. TF restarted s/p intubated 7/3.  Noted total body fluid overloaded per MD, on diuresis.    Previous Nutrition Diagnosis:    Active [   ]  Resolved [   ]    If resolved, new PES:     Goal:    Recommendations:    Education:     Risk Level: High [   ] Moderate [   ] Low [   ]    *NOTE IN PROGRESS* Admitting Diagnosis:   Patient is a 53y old  Male who presents with a chief complaint of endoleak, abdominal pain (05 Jul 2023 11:00)      PAST MEDICAL & SURGICAL HISTORY:  HTN (hypertension)      Aortic dissection      CAD (coronary artery disease)      Seizure disorder      Seizure disorder      Hypertension      Status post endovascular aneurysm repair (EVAR)      S/P aortic bifurcation bypass graft      S/P CABG x 1          Current Nutrition Order: NPO; Vital 1.0 @70ml/hr x 24hrs; provides 1680ml total volume, 1680 kcals, 67.2g protein, 1401ml free water daily + 2 LPS daily [provides 200 kcals, 30g protein]    PO Intake: Good (%) [   ]  Fair (50-75%) [   ] Poor (<25%) [   ]- N/A    GI Issues: nontender/nondistended, loose BM/diarrhea noted 7/2-7/4.    Pain: No pain/absence of nonverbal indicators of pain    Skin Integrity: stage II PUs to sacrum & L scapula, penis, skin tear to L ear, CODIE removed, midline incision healed. 2+ dependent edema    Labs:   07-05    136  |  107  |  15  ----------------------------<  98  4.1   |  20<L>  |  0.65    Ca    7.8<L>      05 Jul 2023 05:21  Phos  4.7     07-05  Mg     1.9     07-05    TPro  5.3<L>  /  Alb  2.0<L>  /  TBili  0.4  /  DBili  x   /  AST  19  /  ALT  9<L>  /  AlkPhos  146<H>  07-04    CAPILLARY BLOOD GLUCOSE      POCT Blood Glucose.: 96 mg/dL (05 Jul 2023 10:56)  POCT Blood Glucose.: 84 mg/dL (04 Jul 2023 23:26)  POCT Blood Glucose.: 91 mg/dL (04 Jul 2023 16:50)      Medications:  MEDICATIONS  (STANDING):  acetaminophen     Tablet .. 975 milliGRAM(s) Oral every 6 hours  albuterol/ipratropium for Nebulization 3 milliLiter(s) Nebulizer every 6 hours  artificial  tears Solution 1 Drop(s) Both EYES two times a day  aspirin  chewable 81 milliGRAM(s) Oral daily  chlorhexidine 0.12% Liquid 15 milliLiter(s) Oral Mucosa every 12 hours  chlorhexidine 2% Cloths 1 Application(s) Topical daily  dextrose 5%. 1000 milliLiter(s) (100 mL/Hr) IV Continuous <Continuous>  dextrose 50% Injectable 25 Gram(s) IV Push once  glucagon  Injectable 1 milliGRAM(s) IntraMuscular once  heparin   Injectable 5000 Unit(s) SubCutaneous every 8 hours  insulin lispro (ADMELOG) corrective regimen sliding scale   SubCutaneous every 6 hours  lacosamide Solution 200 milliGRAM(s) Oral two times a day  loperamide Liquid 2 milliGRAM(s) Enteral Tube every 8 hours  meropenem  IVPB 1000 milliGRAM(s) IV Intermittent every 8 hours  multivitamin/minerals/iron Oral Solution (CENTRUM) 15 milliLiter(s) Oral daily  oxyCODONE    Solution 5 milliGRAM(s) Oral every 4 hours  pantoprazole  Injectable 40 milliGRAM(s) IV Push daily  propofol Infusion 10 MICROgram(s)/kG/Min (5.46 mL/Hr) IV Continuous <Continuous>  sodium chloride 3%  Inhalation 4 milliLiter(s) Inhalation every 6 hours  vancomycin  IVPB 1000 milliGRAM(s) IV Intermittent every 12 hours    MEDICATIONS  (PRN):  dextrose Oral Gel 15 Gram(s) Oral once PRN Blood Glucose LESS THAN 70 milliGRAM(s)/deciliter  HYDROmorphone  Injectable 0.5 milliGRAM(s) IV Push every 4 hours PRN breakthrough pain  ondansetron Injectable 4 milliGRAM(s) IV Push every 8 hours PRN Nausea and/or Vomiting  sodium chloride 0.9% lock flush 10 milliLiter(s) IV Push every 1 hour PRN Pre/post blood products, medications, blood draw, and to maintain line patency      Weight: 91kg [10 Ghazala 2023]  Daily   90.9kg [7 June 2023]  Daily 83.3kg [4 July 2023]    Weight Change: 7.7kg/8.4% wt loss x 1 month- ? 2/2 fluid shifts/inadequate nutrition. Need reweight to confirm. Please obtain weekly weights to ensure adequacy of nutrition provision    Estimated energy needs:   Ideal body weight (80.9kg) used for calculations as pt >100% of IBW, BMI <30 per [facility] Standards of Care. Needs estimated for age and adjusted for current clinical status     Calories: 2022.5-2427 kcals based on 25-30 kcals/kg  Protein: 113.3-129.4g protein based on 1.4-1.6g protein/kg  *Fluid Needs per team    Subjective:   53yMale w/ PMHx of HTN, type A aortic dissection s/p Dacron grafts, AV resuspension in 2013, CAD s/p CABG x 1 SVG to RCA (5/2013 with Dr. Medina), seizure disorder, recent admission 3/20-4/14 for replacement of transverse aortic arch, second stage TEVAR and aorto-axillary bypass, AV replacement (bio 23mm), and CABG x 1 (SVG-RCA). Pt found to have endo leak and taken to OR for TEVAR revision on 5/5, Post op course c/b Klebsiella bacteremia (5/15), resp failure requiring intubation on 5/18, Mucus plugging s/p Bronch 5/19 and PEA arrest. Post operatively pt also found to have hemorrhagic pancreatitis with venu-pancreatic abscess possibly 2* to Depakote therefore s/p IR aspiration of peripancreatic fluid collection and paracentesis on 5/22, IR venu-pancreatic abscess drainage and placement of drainage catheter on 5/24. Pt then transferred from CTICU to SICU for further mgmt of hemorrhagic pancreatitis on 5/25. s/p IR placement of 2 new drainage catheters and catheter exchange on 5/26. LUQ drainage 5/30. OR 5/31 exlap washout & replacement of 3 new JPs and abthera vac with open abdomen. RTOR 6/1, no bleeding, evac of old blood, placement of feeding J-tube. Failed extubation 6/3: s/p Trach 6/5. 6/28: adv to soft diet/thin liquids-minimal PO intake. decannulated today. stopped tube feeds. 6/29: persistent nausea, no additional emesis. Minimal PO intake.    Chart reviewed. Discussed with IDT. TF restated, EN as primary means to nutrition s/p intubation 7/3. I&Os x 24hrs: 2793 [1680ml TF + 370ml flush/ 2345ml uop = +448ml net. Vent to AC/CMV w/ CPAP trials. Current EN provision suboptimal to meet needs- provides 23kcals/kg & 1.20g protein/kg. Pt currently sedated on propofol [currently providing 330 kcals daily]. Need to monitor propofol infusion & adjust TF provision to prevent over/underfeeding. TMax: 38.0 C. HR trending high, BP WNL. MAPs >65 mmHg. Noted total body fluid overloaded per MD [sodium borderline low], on diuresis. More concentrated formula appropriate to limit free water provision. Phos 4.7 high, monitor. Noted diarrhea x 3+ days ? 2/2 liquid medications, now on imodium. POC BG trending 87-96mg/dL x 24hrs. RDN will continue to monitor, reassess, and intervene as appropriate.       Previous Nutrition Diagnosis: Increased nutrients RT increased demands AEB Vent, Pressure ulcers    Active [ X  ]  Resolved [   ]    If resolved, new PES:     Goal:  Pt will meet at least 75% of protein & energy needs via most appropriate route for nutrition     Recommendations:  1. Recommend formula change to Vital 1.5 @50ml/hr x 24hrs; provides 1200ml total volume, 1800 kcals, 81g protein, 916.8ml free water daily  - recommend additional 1 LPS BID; provides 200kcals, 30g protein  - monitor propofol infusion & adjust TF to prevent over/underfeeding  - hold feeds if increase in pressor requirements, MAPs consistently trending <60 mmHg, lactic acidosis present, GI intolerance is noted   2. GI  - consider Banatrol if ongoing diarrhea  3. Monitor lytes, replete prn  - risk for depletion iso diarrhea & diuresis  4. Monitor chemistry, skin integrity, GI function  5. Align nutrition with GOC at all times    Education: deferred     Risk Level: High [ X ] Moderate [   ] Low [   ]

## 2023-07-05 NOTE — PROCEDURE NOTE - NSANATOMICLLOCATION_GEN_A_CORE
Abdomen/left
right/upper arm
Left arm
right/upper arm
right
right/axillae
right/upper arm
lumbar spine/right

## 2023-07-05 NOTE — PROGRESS NOTE ADULT - ASSESSMENT
53M h/o seizure d/o, aortic dissection s/p AVR, aortic graft revision 3/20/23, 2nd stage TEVAR 5/5/23, course c/b peripancreatic/RP hemorrhage/hematoma formation s/p multiple washout and recurrent respiratory failure with most recent reintubation on 6/3 due to excessive secretions and difficulty protecting airway    #Acute hypoxemic respiratory failure  #Pulmonary edema  #Aspiration pneumonia   #Critical illness neuropathy/myopathy    Patient with hypoxemic respiratory failure from critical illness myopathy requiring tracheostomy placement 6/6 and was decannulated 6/29 after trial with capping. Patient appears to be acutely decompensated with evidence of pulmonary edema on POCUS with bilateral B lines which have improved. Concern for aspiration event, small L sided consolidation on POCUS. Agree with diuresis, keep TV ideally cc/kg IBW, wean vent as tolerated, cover for infection. Eosinophilia has since resolved.    Recommend:   - Continue diuresis still has evidence of pulmonary edema  - Broad spectrum antibiotics to cover HAP and anaerobics  - Pending sputum culture    Patient discussed with Dr. Coronel.

## 2023-07-05 NOTE — PROCEDURE NOTE - NSPROCNAME_GEN_A_CORE
Point of Care Ultrasound Lung
Bronchoscopy
Point of Care Ultrasound Musculoskeletal/Soft Tissue
Lumbar Puncture
Bronchoscopy
Peripheral Line Insertion
Arterial Puncture/Cannulation
Bronchoscopy
Central Line Insertion
Interventional Radiology
Bronchoscopy
Central Line Insertion
Peripheral Line Insertion
Peripheral Line Insertion
Chest Tube
Tracheal Intubation
General
Central Line Insertion
Interventional Radiology
Central Line Insertion
General
Peripheral Line Insertion
Arterial Puncture/Cannulation
Central Line Insertion

## 2023-07-05 NOTE — PROCEDURE NOTE - NSSITEPREP_SKIN_A_CORE
chlorhexidine
chlorhexidine/Adherence to aseptic technique: hand hygiene prior to donning barriers (gown, gloves), don cap and mask, sterile drape over patient
chlorhexidine
chlorhexidine/Adherence to aseptic technique: hand hygiene prior to donning barriers (gown, gloves), don cap and mask, sterile drape over patient
chlorhexidine

## 2023-07-05 NOTE — PROGRESS NOTE ADULT - ASSESSMENT
IMPRESSION:  Suspect worsening fever and leukocytosis was due to health care associated pneumonia.  Pathogen is pseudomonas.  Fever curve and leukocytosis improving    Recommend:  1.  Can stop Vancomycin and Meropenem  2.  Start Zosyn 4.5 grams IV q8hrs (infuse over 4hrs).  Recommend completing a 7 day course of antibiotics (Zosyn ends after doses on 7/9/2023)    ID team 2 will sign off.  Reconsult as needed

## 2023-07-05 NOTE — PROCEDURE NOTE - NSTIMEOUT_GEN_A_CORE
Patient's first and last name, , procedure, and correct site confirmed prior to the start of procedure.

## 2023-07-06 LAB
ANION GAP SERPL CALC-SCNC: 6 MMOL/L — SIGNIFICANT CHANGE UP (ref 5–17)
BLD GP AB SCN SERPL QL: NEGATIVE — SIGNIFICANT CHANGE UP
BUN SERPL-MCNC: 19 MG/DL — SIGNIFICANT CHANGE UP (ref 7–23)
CALCIUM SERPL-MCNC: 7.9 MG/DL — LOW (ref 8.4–10.5)
CHLORIDE SERPL-SCNC: 109 MMOL/L — HIGH (ref 96–108)
CO2 SERPL-SCNC: 23 MMOL/L — SIGNIFICANT CHANGE UP (ref 22–31)
CREAT SERPL-MCNC: 0.59 MG/DL — SIGNIFICANT CHANGE UP (ref 0.5–1.3)
CULTURE RESULTS: SIGNIFICANT CHANGE UP
CYSTATIN C SERPL-MCNC: 1.78 MG/L — HIGH (ref 0.72–1.32)
EGFR: 115 ML/MIN/1.73M2 — SIGNIFICANT CHANGE UP
GLUCOSE BLDC GLUCOMTR-MCNC: 104 MG/DL — HIGH (ref 70–99)
GLUCOSE BLDC GLUCOMTR-MCNC: 107 MG/DL — HIGH (ref 70–99)
GLUCOSE BLDC GLUCOMTR-MCNC: 110 MG/DL — HIGH (ref 70–99)
GLUCOSE BLDC GLUCOMTR-MCNC: 112 MG/DL — HIGH (ref 70–99)
GLUCOSE SERPL-MCNC: 97 MG/DL — SIGNIFICANT CHANGE UP (ref 70–99)
HCT VFR BLD CALC: 25 % — LOW (ref 39–50)
HGB BLD-MCNC: 7.7 G/DL — LOW (ref 13–17)
MAGNESIUM SERPL-MCNC: 1.7 MG/DL — SIGNIFICANT CHANGE UP (ref 1.6–2.6)
MCHC RBC-ENTMCNC: 29.2 PG — SIGNIFICANT CHANGE UP (ref 27–34)
MCHC RBC-ENTMCNC: 30.8 GM/DL — LOW (ref 32–36)
MCV RBC AUTO: 94.7 FL — SIGNIFICANT CHANGE UP (ref 80–100)
NRBC # BLD: 0 /100 WBCS — SIGNIFICANT CHANGE UP (ref 0–0)
PHOSPHATE SERPL-MCNC: 4.2 MG/DL — SIGNIFICANT CHANGE UP (ref 2.5–4.5)
PLATELET # BLD AUTO: 418 K/UL — HIGH (ref 150–400)
POTASSIUM SERPL-MCNC: 3.8 MMOL/L — SIGNIFICANT CHANGE UP (ref 3.5–5.3)
POTASSIUM SERPL-SCNC: 3.8 MMOL/L — SIGNIFICANT CHANGE UP (ref 3.5–5.3)
RBC # BLD: 2.64 M/UL — LOW (ref 4.2–5.8)
RBC # FLD: 17.6 % — HIGH (ref 10.3–14.5)
RH IG SCN BLD-IMP: POSITIVE — SIGNIFICANT CHANGE UP
SODIUM SERPL-SCNC: 138 MMOL/L — SIGNIFICANT CHANGE UP (ref 135–145)
SPECIMEN SOURCE: SIGNIFICANT CHANGE UP
WBC # BLD: 17.46 K/UL — HIGH (ref 3.8–10.5)
WBC # FLD AUTO: 17.46 K/UL — HIGH (ref 3.8–10.5)

## 2023-07-06 PROCEDURE — 71045 X-RAY EXAM CHEST 1 VIEW: CPT | Mod: 26

## 2023-07-06 PROCEDURE — 99291 CRITICAL CARE FIRST HOUR: CPT | Mod: GC

## 2023-07-06 PROCEDURE — 99233 SBSQ HOSP IP/OBS HIGH 50: CPT | Mod: GC

## 2023-07-06 RX ORDER — DEXMEDETOMIDINE HYDROCHLORIDE IN 0.9% SODIUM CHLORIDE 4 UG/ML
0.2 INJECTION INTRAVENOUS
Qty: 400 | Refills: 0 | Status: DISCONTINUED | OUTPATIENT
Start: 2023-07-06 | End: 2023-07-06

## 2023-07-06 RX ORDER — FUROSEMIDE 40 MG
40 TABLET ORAL ONCE
Refills: 0 | Status: COMPLETED | OUTPATIENT
Start: 2023-07-06 | End: 2023-07-06

## 2023-07-06 RX ORDER — OXYCODONE HYDROCHLORIDE 5 MG/1
5 TABLET ORAL EVERY 6 HOURS
Refills: 0 | Status: DISCONTINUED | OUTPATIENT
Start: 2023-07-06 | End: 2023-07-09

## 2023-07-06 RX ORDER — MEROPENEM 1 G/30ML
1000 INJECTION INTRAVENOUS EVERY 8 HOURS
Refills: 0 | Status: COMPLETED | OUTPATIENT
Start: 2023-07-07 | End: 2023-07-11

## 2023-07-06 RX ORDER — HYDROMORPHONE HYDROCHLORIDE 2 MG/ML
0.5 INJECTION INTRAMUSCULAR; INTRAVENOUS; SUBCUTANEOUS EVERY 4 HOURS
Refills: 0 | Status: DISCONTINUED | OUTPATIENT
Start: 2023-07-06 | End: 2023-07-09

## 2023-07-06 RX ORDER — POTASSIUM CHLORIDE 20 MEQ
40 PACKET (EA) ORAL EVERY 4 HOURS
Refills: 0 | Status: COMPLETED | OUTPATIENT
Start: 2023-07-06 | End: 2023-07-06

## 2023-07-06 RX ORDER — MAGNESIUM SULFATE 500 MG/ML
2 VIAL (ML) INJECTION ONCE
Refills: 0 | Status: COMPLETED | OUTPATIENT
Start: 2023-07-06 | End: 2023-07-06

## 2023-07-06 RX ORDER — MEROPENEM 1 G/30ML
1000 INJECTION INTRAVENOUS EVERY 8 HOURS
Refills: 0 | Status: COMPLETED | OUTPATIENT
Start: 2023-07-06 | End: 2023-07-07

## 2023-07-06 RX ORDER — MEROPENEM 1 G/30ML
1000 INJECTION INTRAVENOUS EVERY 8 HOURS
Refills: 0 | Status: DISCONTINUED | OUTPATIENT
Start: 2023-07-06 | End: 2023-07-06

## 2023-07-06 RX ADMIN — MEROPENEM 100 MILLIGRAM(S): 1 INJECTION INTRAVENOUS at 21:26

## 2023-07-06 RX ADMIN — Medication 2 MILLIGRAM(S): at 11:29

## 2023-07-06 RX ADMIN — Medication 975 MILLIGRAM(S): at 06:53

## 2023-07-06 RX ADMIN — HEPARIN SODIUM 5000 UNIT(S): 5000 INJECTION INTRAVENOUS; SUBCUTANEOUS at 06:37

## 2023-07-06 RX ADMIN — PROPOFOL 5.46 MICROGRAM(S)/KG/MIN: 10 INJECTION, EMULSION INTRAVENOUS at 17:44

## 2023-07-06 RX ADMIN — OXYCODONE HYDROCHLORIDE 5 MILLIGRAM(S): 5 TABLET ORAL at 17:14

## 2023-07-06 RX ADMIN — OXYCODONE HYDROCHLORIDE 5 MILLIGRAM(S): 5 TABLET ORAL at 23:21

## 2023-07-06 RX ADMIN — Medication 975 MILLIGRAM(S): at 23:21

## 2023-07-06 RX ADMIN — MEROPENEM 100 MILLIGRAM(S): 1 INJECTION INTRAVENOUS at 17:15

## 2023-07-06 RX ADMIN — OXYCODONE HYDROCHLORIDE 5 MILLIGRAM(S): 5 TABLET ORAL at 17:42

## 2023-07-06 RX ADMIN — Medication 2 MILLIGRAM(S): at 23:07

## 2023-07-06 RX ADMIN — Medication 975 MILLIGRAM(S): at 06:38

## 2023-07-06 RX ADMIN — Medication 975 MILLIGRAM(S): at 17:42

## 2023-07-06 RX ADMIN — PIPERACILLIN AND TAZOBACTAM 25 GRAM(S): 4; .5 INJECTION, POWDER, LYOPHILIZED, FOR SOLUTION INTRAVENOUS at 15:45

## 2023-07-06 RX ADMIN — Medication 2 MILLIGRAM(S): at 17:16

## 2023-07-06 RX ADMIN — SODIUM CHLORIDE 4 MILLILITER(S): 9 INJECTION INTRAMUSCULAR; INTRAVENOUS; SUBCUTANEOUS at 05:22

## 2023-07-06 RX ADMIN — OXYCODONE HYDROCHLORIDE 5 MILLIGRAM(S): 5 TABLET ORAL at 00:02

## 2023-07-06 RX ADMIN — Medication 81 MILLIGRAM(S): at 11:28

## 2023-07-06 RX ADMIN — OXYCODONE HYDROCHLORIDE 5 MILLIGRAM(S): 5 TABLET ORAL at 11:29

## 2023-07-06 RX ADMIN — CHLORHEXIDINE GLUCONATE 1 APPLICATION(S): 213 SOLUTION TOPICAL at 11:30

## 2023-07-06 RX ADMIN — PIPERACILLIN AND TAZOBACTAM 25 GRAM(S): 4; .5 INJECTION, POWDER, LYOPHILIZED, FOR SOLUTION INTRAVENOUS at 07:53

## 2023-07-06 RX ADMIN — CHLORHEXIDINE GLUCONATE 15 MILLILITER(S): 213 SOLUTION TOPICAL at 17:14

## 2023-07-06 RX ADMIN — HEPARIN SODIUM 5000 UNIT(S): 5000 INJECTION INTRAVENOUS; SUBCUTANEOUS at 21:26

## 2023-07-06 RX ADMIN — PROPOFOL 5.46 MICROGRAM(S)/KG/MIN: 10 INJECTION, EMULSION INTRAVENOUS at 10:24

## 2023-07-06 RX ADMIN — Medication 40 MILLIEQUIVALENT(S): at 09:43

## 2023-07-06 RX ADMIN — Medication 975 MILLIGRAM(S): at 23:06

## 2023-07-06 RX ADMIN — Medication 975 MILLIGRAM(S): at 11:30

## 2023-07-06 RX ADMIN — Medication 40 MILLIGRAM(S): at 07:53

## 2023-07-06 RX ADMIN — Medication 1 DROP(S): at 17:16

## 2023-07-06 RX ADMIN — HEPARIN SODIUM 5000 UNIT(S): 5000 INJECTION INTRAVENOUS; SUBCUTANEOUS at 14:06

## 2023-07-06 RX ADMIN — OXYCODONE HYDROCHLORIDE 5 MILLIGRAM(S): 5 TABLET ORAL at 05:15

## 2023-07-06 RX ADMIN — SODIUM CHLORIDE 4 MILLILITER(S): 9 INJECTION INTRAMUSCULAR; INTRAVENOUS; SUBCUTANEOUS at 16:30

## 2023-07-06 RX ADMIN — LACOSAMIDE 200 MILLIGRAM(S): 50 TABLET ORAL at 17:14

## 2023-07-06 RX ADMIN — Medication 40 MILLIEQUIVALENT(S): at 09:46

## 2023-07-06 RX ADMIN — CHLORHEXIDINE GLUCONATE 15 MILLILITER(S): 213 SOLUTION TOPICAL at 06:37

## 2023-07-06 RX ADMIN — Medication 975 MILLIGRAM(S): at 00:02

## 2023-07-06 RX ADMIN — SODIUM CHLORIDE 4 MILLILITER(S): 9 INJECTION INTRAMUSCULAR; INTRAVENOUS; SUBCUTANEOUS at 09:30

## 2023-07-06 RX ADMIN — PANTOPRAZOLE SODIUM 40 MILLIGRAM(S): 20 TABLET, DELAYED RELEASE ORAL at 11:27

## 2023-07-06 RX ADMIN — Medication 3 MILLILITER(S): at 09:30

## 2023-07-06 RX ADMIN — Medication 975 MILLIGRAM(S): at 17:15

## 2023-07-06 RX ADMIN — Medication 3 MILLILITER(S): at 05:20

## 2023-07-06 RX ADMIN — OXYCODONE HYDROCHLORIDE 5 MILLIGRAM(S): 5 TABLET ORAL at 23:06

## 2023-07-06 RX ADMIN — OXYCODONE HYDROCHLORIDE 5 MILLIGRAM(S): 5 TABLET ORAL at 05:00

## 2023-07-06 RX ADMIN — PROPOFOL 5.46 MICROGRAM(S)/KG/MIN: 10 INJECTION, EMULSION INTRAVENOUS at 23:24

## 2023-07-06 RX ADMIN — LACOSAMIDE 200 MILLIGRAM(S): 50 TABLET ORAL at 06:39

## 2023-07-06 RX ADMIN — Medication 2 MILLIGRAM(S): at 06:42

## 2023-07-06 RX ADMIN — OXYCODONE HYDROCHLORIDE 5 MILLIGRAM(S): 5 TABLET ORAL at 11:31

## 2023-07-06 RX ADMIN — Medication 25 GRAM(S): at 07:53

## 2023-07-06 RX ADMIN — Medication 15 MILLILITER(S): at 11:30

## 2023-07-06 RX ADMIN — Medication 975 MILLIGRAM(S): at 11:28

## 2023-07-06 RX ADMIN — Medication 3 MILLILITER(S): at 21:26

## 2023-07-06 RX ADMIN — PROPOFOL 5.46 MICROGRAM(S)/KG/MIN: 10 INJECTION, EMULSION INTRAVENOUS at 04:07

## 2023-07-06 RX ADMIN — Medication 3 MILLILITER(S): at 16:29

## 2023-07-06 RX ADMIN — SODIUM CHLORIDE 4 MILLILITER(S): 9 INJECTION INTRAMUSCULAR; INTRAVENOUS; SUBCUTANEOUS at 21:26

## 2023-07-06 RX ADMIN — Medication 40 MILLIGRAM(S): at 14:06

## 2023-07-06 RX ADMIN — Medication 1 DROP(S): at 06:43

## 2023-07-06 NOTE — PROGRESS NOTE ADULT - ASSESSMENT
53M w PMHx of HTN, type A aortic dissection s/p Dacron grafts, AV resuspension in 2013, CAD s/p CABG x 1 SVG to RCA (5/2013 with Dr. Medina), seizure disorder, recent admission 3/20-4/14 for replacement of transverse aortic arch, second stage TEVAR and aorto-axillary bypass, AV replacement (bio 23mm), and CABG x 1 (SVG-RCA). Pt found to have endo leak and taken to OR for TEVAR revision on 5/5. Post op course c/b Klebsiella bacteremia (5/15), resp failure requiring intubation on 5/18, Mucus plugging s/p Bronch 5/19 and PEA arrest. Post operatively pt also found to have hemorrhagic pancreatitis with venu-pancreatic abscess possibly 2* to Depakote therefore s/p IR aspiration of peripancreatic fluid collection and paracentesis on 5/22, IR venu-pancreatic abscess drainage and placement of drainage catheter on 5/24. Pt then transferred from CTICU to SICU for septic shock, and further mgmt of hemorrhagic pancreatitis on 5/25. s/p IR placement of 2 new drainage catheters and catheter exchange on 5/26. LUQ drainage 5/30. OR 5/31 for exlap washout & replacement of 3 new JPs and abthera vac with open abdomen. RTOR 6/1, no bleeding, evac of old blood, placement of feeding J-tube, failed extubation due to PNA, completed ABX course and s/p trach 6/5 and decannulation 6/28. stepped down, but returned to SICU (7/2) for respiratory distress. Emergently intubated (7/3) for aspiration pneumonia, sputum cx grew pseudomonas.     NEURO: Sedation: Propfol gtt, wean OxyIR down to 5q6h w/ Dilaudid breakthrough, Hx seizures: cont vimpat. Avoid depakote due to drug-related pancreatitis risk.   HEENT: Artificial tears, Torticollis improved  CV: s/p PEA arrest on 5/24 night. TTE (6/14) LVEF 75%, LVH, biatrial enlargement and elevated PA pressure (elevated from previous), mild to mod MR/TR . Cont ASA 81mg. Metoprolol stopped due to low BP after starting propofol. total body fluid overloaded, cont diurese with lasix for goal net negative I&O.   PULM: Prior ARDS/PNA resolved and trach decannulated 6/28. Acute hypoxic respiratory failure, secondary to R side aspiration PNA, emergently intubated (7/3), restarted meropenem with empiric vanco per ID, sputum cx grew pseudomonas, abx deescalated to zosyn, stopped vanco. cont Duonebs, 3% saline nebs, doing CPAP 5/5/40% trial. has lots of secretions. hold of on extubating until secretions improves.   GI/FEN: TF Vital 1.0 at 70cc via feeding J-tube, diarrhea on imodium, MTV, Protonix. Hemorrhagic pancreatitis: s/p multiple drain placements and paracentisis by IR team. Cdiff negative (6/19).  : acute renal failure- been off CVVHD since mid June with renal recovery. coulter for strict I&O  HEME: Acute blood loss anemia: hg stable.   ENDO: mISS  ID: Sepsis without shock, Pseudomonas in Sputum: Meropenem(7/2-7/5) and Vanc (7/2-7/5). Zosyn (7/5 -) for pseudmmonal PNA.   - PRIOR ABX: Ertapenem (6/13-6/16, 6/18-6/23), meropenem (6/9-6/13), vanco (6/9, 6/18-6/22), caspofungin (6/9-6/12, 6/18-6/21), completed Ceftriaxone( 6/5- 6/15) Chloe (5/21-6/5),  Caspo (5/23-5/30, 6/18-20), Ampicillin (5/21- 5/27).   - Prior Cultures: Kleb bacteremia from 5/15 & 5/17. bronch cx kleb, enterobacter (zosyn, erta resistant), E faecalis, strep angionosus from 5/19, pancreatic abscess cx pan-sensitive kleb 5/22.   PPX: SCDs, SQH  LINES: PIVs // L rad kalpana (5/31-6/15), 5Fr PIV in LUE (6/13-15), Lsubclav HD Cath (5/31-6/12), RIJ TLC (5/22-5/27), RIJ HD cath (5/22-31), R Ax kalpana(5/12-5/31), [facility]J TLC (5/23-6/1), RIJ TLC (6/1-13)   WOUNDS/DRAINS: CODIE removed. midline incision healed.   PT: will request again when pt off propofol awake alert able to participate.   Dispo: SICU     53M w PMHx of HTN, type A aortic dissection s/p Dacron grafts, AV resuspension in 2013, CAD s/p CABG x 1 SVG to RCA (5/2013 with Dr. Medina), seizure disorder, recent admission 3/20-4/14 for replacement of transverse aortic arch, second stage TEVAR and aorto-axillary bypass, AV replacement (bio 23mm), and CABG x 1 (SVG-RCA). Pt found to have endo leak and taken to OR for TEVAR revision on 5/5. Post op course c/b Klebsiella bacteremia (5/15), resp failure requiring intubation on 5/18, Mucus plugging s/p Bronch 5/19 and PEA arrest. Post operatively pt also found to have hemorrhagic pancreatitis with venu-pancreatic abscess possibly 2* to Depakote therefore s/p IR aspiration of peripancreatic fluid collection and paracentesis on 5/22, IR venu-pancreatic abscess drainage and placement of drainage catheter on 5/24. Pt then transferred from CTICU to SICU for septic shock, and further mgmt of hemorrhagic pancreatitis on 5/25. s/p IR placement of 2 new drainage catheters and catheter exchange on 5/26. LUQ drainage 5/30. OR 5/31 for exlap washout & replacement of 3 new JPs and abthera vac with open abdomen. RTOR 6/1, no bleeding, evac of old blood, placement of feeding J-tube, failed extubation due to PNA, completed ABX course and s/p trach 6/5 and decannulation 6/28. stepped down, but returned to SICU (7/2) for respiratory distress. Emergently intubated (7/3) for aspiration pneumonia, sputum cx grew pseudomonas.     NEURO: Sedation: Propfol gtt, wean OxyIR down to 5q6h w/ Dilaudid breakthrough, Hx seizures: cont vimpat. Avoid depakote due to drug-related pancreatitis risk.   HEENT: Artificial tears, Torticollis improved  CV: s/p PEA arrest on 5/24 night. TTE (6/14) LVEF 75%, LVH, biatrial enlargement and elevated PA pressure (elevated from previous), mild to mod MR/TR . Cont ASA 81mg. Metoprolol stopped due to low BP after starting propofol. total body fluid overloaded, cont diurese with lasix for goal net negative I&O.   PULM: Prior ARDS/PNA resolved and trach decannulated 6/28. Acute hypoxic respiratory failure, secondary to R side aspiration PNA, emergently intubated (7/3), restarted meropenem with empiric vanco per ID, sputum cx grew pseudomonas, abx deescalated to zosyn, stopped vanco. cont Duonebs, 3% saline nebs, doing CPAP 5/5/40% trial. has lots of secretions. hold of on extubating until secretions improves.   GI/FEN: TF Vital 1.0 at 70cc via feeding J-tube, diarrhea on imodium, MTV, Protonix. Hemorrhagic pancreatitis: s/p multiple drain placements and paracentisis by IR team. Cdiff negative (6/19).  : acute renal failure- been off CVVHD since mid June with renal recovery. coulter for strict I&O  HEME: Acute blood loss anemia: hg stable.   ENDO: mISS  ID: Sepsis without shock, Pseudomonas in Sputum: deescalate from Meropenem & vanc to Zosyn (7/5-) for pseudmmonal PNA.   - PRIOR ABX: Ertapenem (6/13-6/16, 6/18-6/23), meropenem (5/21-6/5, 6/9-6/13, 7/2-7/5), vanco (6/9, 6/18-6/22, 7/2-7/5), caspofungin (5/23-5/30, 6/9-6/12, 6/18-6/21), Ceftriaxone( 6/5- 6/15), Ampicillin (5/21- 5/27).   - Prior Cultures: Kleb bacteremia from 5/15 & 5/17. bronch cx kleb, enterobacter (zosyn, erta resistant), E faecalis, strep angionosus from 5/19, pancreatic abscess cx pan-sensitive kleb 5/22.   PPX: SCDs, SQH  LINES: PIVs // L rad kalpana (5/31-6/15), 5Fr PIV in LUE (6/13-15), Lsubclav HD Cath (5/31-6/12), RIJ TLC (5/22-5/27), RIJ HD cath (5/22-31), R Ax kalpana(5/12-5/31), [facility] TLC (5/23-6/1), RIJ TLC (6/1-13)   WOUNDS/DRAINS: CODIE removed. midline incision healed.   PT: will request again when pt off propofol awake alert able to participate.   Dispo: SICU

## 2023-07-06 NOTE — PROGRESS NOTE ADULT - SUBJECTIVE AND OBJECTIVE BOX
S: No new issues/events overnight, no new med c/o    O: ICU Vital Signs Last 24 Hrs  T(F): 99.7 (07-06-23 @ 09:00), Max: 100.4 (07-05-23 @ 12:27)  HR: 105 (07-06-23 @ 10:00) (82 - 121)  BP: 112/64 (07-06-23 @ 10:00) (112/64 - 150/85)  BP(mean): 83 (07-06-23 @ 10:00) (83 - 112)  ABP: --  RR: 20 (07-06-23 @ 10:00) (16 - 46)  SpO2: 100% (07-06-23 @ 10:00) (92% - 100%)    PHYSICAL EXAM:   Neurological: lightly sedated with propofol. arousible easily by voice. strength 5/5 b/l  ENT: mucus membrane moist  Cardiovascular: RRR  Respiratory: CTA  Gastrointestinal: soft, NT, ND, BS+, midline incision healing, no erythema, no bleeding, no pus.   Extremities: warm, 2+ dependent edema  Vascular: no cyanosis/erythema  Skin: no rashes  MSK: no joint swelling.       LABS:    07-06    138  |  109<H>  |  19  ----------------------------<  97  3.8   |  23  |  0.59    Ca    7.9<L>      06 Jul 2023 05:30  Phos  4.2     07-06  Mg     1.7     07-06                          7.7    17.46 )-----------( 418      ( 06 Jul 2023 05:30 )             25.0     ABG - ( 05 Jul 2023 05:22 )  pH, Arterial: 7.36  pH, Blood: x     /  pCO2: 38    /  pO2: 67    / HCO3: 22    / Base Excess: -3.6  /  SaO2: 95.5            Urinalysis Basic - ( 06 Jul 2023 05:30 )    Color: x / Appearance: x / SG: x / pH: x  Gluc: 97 mg/dL / Ketone: x  / Bili: x / Urobili: x   Blood: x / Protein: x / Nitrite: x   Leuk Esterase: x / RBC: x / WBC x   Sq Epi: x / Non Sq Epi: x / Bacteria: x    CAPILLARY BLOOD GLUCOSE      POCT Blood Glucose.: 94 mg/dL (05 Jul 2023 23:25)  POCT Blood Glucose.: 102 mg/dL (05 Jul 2023 17:46)  POCT Blood Glucose.: 96 mg/dL (05 Jul 2023 10:56)    MEDICATIONS  (STANDING):  acetaminophen     Tablet .. 975 milliGRAM(s) Oral every 6 hours  albuterol/ipratropium for Nebulization 3 milliLiter(s) Nebulizer every 6 hours  artificial  tears Solution 1 Drop(s) Both EYES two times a day  aspirin  chewable 81 milliGRAM(s) Oral daily  chlorhexidine 0.12% Liquid 15 milliLiter(s) Oral Mucosa every 12 hours  chlorhexidine 2% Cloths 1 Application(s) Topical daily  glucagon  Injectable 1 milliGRAM(s) IntraMuscular once  heparin   Injectable 5000 Unit(s) SubCutaneous every 8 hours  insulin lispro (ADMELOG) corrective regimen sliding scale   SubCutaneous every 6 hours  lacosamide Solution 200 milliGRAM(s) Oral two times a day  loperamide Liquid 2 milliGRAM(s) Enteral Tube every 6 hours  multivitamin/minerals/iron Oral Solution (CENTRUM) 15 milliLiter(s) Oral daily  oxyCODONE    Solution 5 milliGRAM(s) Oral every 6 hours  pantoprazole  Injectable 40 milliGRAM(s) IV Push daily  piperacillin/tazobactam IVPB.. 4.5 Gram(s) IV Intermittent every 8 hours  propofol Infusion 10 MICROgram(s)/kG/Min (5.46 mL/Hr) IV Continuous <Continuous>  sodium chloride 3%  Inhalation 4 milliLiter(s) Inhalation every 6 hours    MEDICATIONS  (PRN):  dextrose Oral Gel 15 Gram(s) Oral once PRN Blood Glucose LESS THAN 70 milliGRAM(s)/deciliter  HYDROmorphone  Injectable 0.5 milliGRAM(s) IV Push every 4 hours PRN breakthrough pain  ondansetron Injectable 4 milliGRAM(s) IV Push every 8 hours PRN Nausea and/or Vomiting  sodium chloride 0.9% lock flush 10 milliLiter(s) IV Push every 1 hour PRN Pre/post blood products, medications, blood draw, and to maintain line patency      Cantrell:	  [ ] None	[ ] Daily Cantrell Order Placed	   Indication:	  [ ] Strict I and O's    [ ] Obstruction     [ ] Incontinence + Stage 3 or 4 Decubitus  Central Line:  [ ] None	   [ ]  Medication / TPN Administration     [ ] No Peripheral IV        S: No new issues/events overnight, no new med c/o    O: ICU Vital Signs Last 24 Hrs  T(F): 99.7 (07-06-23 @ 09:00), Max: 100.4 (07-05-23 @ 12:27)  HR: 105 (07-06-23 @ 10:00) (82 - 121)  BP: 112/64 (07-06-23 @ 10:00) (112/64 - 150/85)  BP(mean): 83 (07-06-23 @ 10:00) (83 - 112)  ABP: --  RR: 20 (07-06-23 @ 10:00) (16 - 46)  SpO2: 100% (07-06-23 @ 10:00) (92% - 100%)    PHYSICAL EXAM:   Neurological: lightly sedated with propofol. arousible easily by voice. strength 5/5 b/l  ENT: mucus membrane moist  Cardiovascular: RRR  Respiratory: CTA  Gastrointestinal: soft, NT, ND, BS+, midline incision healing, no erythema, no bleeding, no pus.   Extremities: warm, 2+ dependent edema  Vascular: no cyanosis/erythema  Skin: no rashes  MSK: no joint swelling.       LABS:    07-06    138  |  109<H>  |  19  ----------------------------<  97  3.8   |  23  |  0.59    Ca    7.9<L>      06 Jul 2023 05:30  Phos  4.2     07-06  Mg     1.7     07-06                          7.7    17.46 )-----------( 418      ( 06 Jul 2023 05:30 )             25.0     ABG - ( 05 Jul 2023 05:22 )  pH, Arterial: 7.36  pH, Blood: x     /  pCO2: 38    /  pO2: 67    / HCO3: 22    / Base Excess: -3.6  /  SaO2: 95.5            Urinalysis Basic - ( 06 Jul 2023 05:30 )    Color: x / Appearance: x / SG: x / pH: x  Gluc: 97 mg/dL / Ketone: x  / Bili: x / Urobili: x   Blood: x / Protein: x / Nitrite: x   Leuk Esterase: x / RBC: x / WBC x   Sq Epi: x / Non Sq Epi: x / Bacteria: x    CAPILLARY BLOOD GLUCOSE      POCT Blood Glucose.: 94 mg/dL (05 Jul 2023 23:25)  POCT Blood Glucose.: 102 mg/dL (05 Jul 2023 17:46)  POCT Blood Glucose.: 96 mg/dL (05 Jul 2023 10:56)    MEDICATIONS  (STANDING):  acetaminophen     Tablet .. 975 milliGRAM(s) Oral every 6 hours  albuterol/ipratropium for Nebulization 3 milliLiter(s) Nebulizer every 6 hours  artificial  tears Solution 1 Drop(s) Both EYES two times a day  aspirin  chewable 81 milliGRAM(s) Oral daily  chlorhexidine 0.12% Liquid 15 milliLiter(s) Oral Mucosa every 12 hours  chlorhexidine 2% Cloths 1 Application(s) Topical daily  glucagon  Injectable 1 milliGRAM(s) IntraMuscular once  heparin   Injectable 5000 Unit(s) SubCutaneous every 8 hours  insulin lispro (ADMELOG) corrective regimen sliding scale   SubCutaneous every 6 hours  lacosamide Solution 200 milliGRAM(s) Oral two times a day  loperamide Liquid 2 milliGRAM(s) Enteral Tube every 6 hours  multivitamin/minerals/iron Oral Solution (CENTRUM) 15 milliLiter(s) Oral daily  oxyCODONE    Solution 5 milliGRAM(s) Oral every 6 hours  pantoprazole  Injectable 40 milliGRAM(s) IV Push daily  piperacillin/tazobactam IVPB.. 4.5 Gram(s) IV Intermittent every 8 hours  propofol Infusion 10 MICROgram(s)/kG/Min (5.46 mL/Hr) IV Continuous <Continuous>  sodium chloride 3%  Inhalation 4 milliLiter(s) Inhalation every 6 hours    MEDICATIONS  (PRN):  dextrose Oral Gel 15 Gram(s) Oral once PRN Blood Glucose LESS THAN 70 milliGRAM(s)/deciliter  HYDROmorphone  Injectable 0.5 milliGRAM(s) IV Push every 4 hours PRN breakthrough pain  ondansetron Injectable 4 milliGRAM(s) IV Push every 8 hours PRN Nausea and/or Vomiting  sodium chloride 0.9% lock flush 10 milliLiter(s) IV Push every 1 hour PRN Pre/post blood products, medications, blood draw, and to maintain line patency      Cantrell:	  [ ] None	[ x] Daily Cantrell Order Placed	   Indication:	  [x ] Strict I and O's    [ ] Obstruction     [ ] Incontinence + Stage 3 or 4 Decubitus  Central Line:  [ ] None	   [x ]  Medication / TPN Administration     [ ] No Peripheral IV

## 2023-07-06 NOTE — PROGRESS NOTE ADULT - SUBJECTIVE AND OBJECTIVE BOX
SUBJECTIVE: PAtient remained tachycardic overnight 107-113 bpm, tolerated 24 h of CPAP.     MEDICATIONS  (STANDING):  acetaminophen     Tablet .. 975 milliGRAM(s) Oral every 6 hours  albuterol/ipratropium for Nebulization 3 milliLiter(s) Nebulizer every 6 hours  artificial  tears Solution 1 Drop(s) Both EYES two times a day  aspirin  chewable 81 milliGRAM(s) Oral daily  chlorhexidine 0.12% Liquid 15 milliLiter(s) Oral Mucosa every 12 hours  chlorhexidine 2% Cloths 1 Application(s) Topical daily  dextrose 5%. 1000 milliLiter(s) (100 mL/Hr) IV Continuous <Continuous>  dextrose 50% Injectable 25 Gram(s) IV Push once  glucagon  Injectable 1 milliGRAM(s) IntraMuscular once  heparin   Injectable 5000 Unit(s) SubCutaneous every 8 hours  insulin lispro (ADMELOG) corrective regimen sliding scale   SubCutaneous every 6 hours  lacosamide Solution 200 milliGRAM(s) Oral two times a day  loperamide Liquid 2 milliGRAM(s) Enteral Tube every 6 hours  multivitamin/minerals/iron Oral Solution (CENTRUM) 15 milliLiter(s) Oral daily  oxyCODONE    Solution 5 milliGRAM(s) Oral every 6 hours  pantoprazole  Injectable 40 milliGRAM(s) IV Push daily  piperacillin/tazobactam IVPB.. 4.5 Gram(s) IV Intermittent every 8 hours  potassium chloride   Powder 40 milliEquivalent(s) Oral every 4 hours  propofol Infusion 10 MICROgram(s)/kG/Min (5.46 mL/Hr) IV Continuous <Continuous>  sodium chloride 3%  Inhalation 4 milliLiter(s) Inhalation every 6 hours    MEDICATIONS  (PRN):  dextrose Oral Gel 15 Gram(s) Oral once PRN Blood Glucose LESS THAN 70 milliGRAM(s)/deciliter  HYDROmorphone  Injectable 0.5 milliGRAM(s) IV Push every 4 hours PRN breakthrough pain  ondansetron Injectable 4 milliGRAM(s) IV Push every 8 hours PRN Nausea and/or Vomiting  sodium chloride 0.9% lock flush 10 milliLiter(s) IV Push every 1 hour PRN Pre/post blood products, medications, blood draw, and to maintain line patency      Drips:     ICU Vital Signs Last 24 Hrs  T(C): 37.6 (06 Jul 2023 09:00), Max: 38 (05 Jul 2023 12:27)  T(F): 99.7 (06 Jul 2023 09:00), Max: 100.4 (05 Jul 2023 12:27)  HR: 112 (06 Jul 2023 09:00) (82 - 121)  BP: 128/73 (06 Jul 2023 09:00) (114/69 - 150/85)  BP(mean): 96 (06 Jul 2023 09:00) (86 - 112)  ABP: --  ABP(mean): --  RR: 26 (06 Jul 2023 09:00) (16 - 46)  SpO2: 100% (06 Jul 2023 09:00) (91% - 100%)    O2 Parameters below as of 06 Jul 2023 10:00  Patient On (Oxygen Delivery Method): ventilator    O2 Concentration (%): 40        Physical Exam:  Neurological: lightly sedated with propofol. arousible easily by voice. strength 5/5 b/l  ENT: mucus membrane moist  Cardiovascular: RRR  Respiratory: bilateral crackles  Gastrointestinal: soft, NT, ND, BS+, midline incision healing, no erythema, no bleeding, no pus.   Extremities: warm, 2+ dependent edema  Vascular: no cyanosis/erythema  Skin: no rashes  MSK: no joint swelling.        Lines/tubes/drains:  Cantrell:	      Vent settings:  Mode: CPAP with PS, FiO2: 40, PEEP: 5, PS: 5, MAP: 6.5, PIP: 10    I&O's Summary    05 Jul 2023 07:01  -  06 Jul 2023 07:00  --------------------------------------------------------  IN: 2130.2 mL / OUT: 3275 mL / NET: -1144.8 mL    06 Jul 2023 07:01  -  06 Jul 2023 09:44  --------------------------------------------------------  IN: 239.7 mL / OUT: 743 mL / NET: -503.3 mL        LABS:                        7.7    17.46 )-----------( 418      ( 06 Jul 2023 05:30 )             25.0     07-06    138  |  109<H>  |  19  ----------------------------<  97  3.8   |  23  |  0.59    Ca    7.9<L>      06 Jul 2023 05:30  Phos  4.2     07-06  Mg     1.7     07-06        Urinalysis Basic - ( 06 Jul 2023 05:30 )    Color: x / Appearance: x / SG: x / pH: x  Gluc: 97 mg/dL / Ketone: x  / Bili: x / Urobili: x   Blood: x / Protein: x / Nitrite: x   Leuk Esterase: x / RBC: x / WBC x   Sq Epi: x / Non Sq Epi: x / Bacteria: x      CAPILLARY BLOOD GLUCOSE      POCT Blood Glucose.: 94 mg/dL (05 Jul 2023 23:25)  POCT Blood Glucose.: 102 mg/dL (05 Jul 2023 17:46)  POCT Blood Glucose.: 96 mg/dL (05 Jul 2023 10:56)

## 2023-07-06 NOTE — PROGRESS NOTE ADULT - ASSESSMENT
53M w PMHx of HTN, type A aortic dissection s/p Dacron grafts, AV resuspension in 2013, CAD s/p CABG x 1 SVG to RCA (5/2013 with Dr. Medina), seizure disorder, recent admission 3/20-4/14 for replacement of transverse aortic arch, second stage TEVAR and aorto-axillary bypass, AV replacement (bio 23mm), and CABG x 1 (SVG-RCA). Pt found to have endo leak and taken to OR for TEVAR revision on 5/5. Post op course c/b Klebsiella bacteremia (5/15), resp failure requiring intubation on 5/18, Mucus plugging s/p Bronch 5/19 and PEA arrest. Post operatively pt also found to have hemorrhagic pancreatitis with venu-pancreatic abscess possibly 2* to Depakote therefore s/p IR aspiration of peripancreatic fluid collection and paracentesis on 5/22, IR venu-pancreatic abscess drainage and placement of drainage catheter on 5/24. Pt then transferred from CTICU to SICU for septic shock, and further mgmt of hemorrhagic pancreatitis on 5/25. s/p IR placement of 2 new drainage catheters and catheter exchange on 5/26. LUQ drainage 5/30. OR 5/31 for exlap washout & replacement of 3 new JPs and abthera vac with open abdomen. RTOR 6/1, no bleeding, evac of old blood, placement of feeding J-tube, failed extubation due to PNA--now completed ABX course ans s/p trach 6/5 and decannulation 6/28. Transferred to SICU (7/2) for respiratory distress. Emergently intubated (7/3) for aspiration pneumonia growing pseudomonas on culture.     Plan   - Continue Zosyn and resume Meropenem   - Wean off sedation to extubate   - Rest of care as per SICU team   - Team 1 will follow

## 2023-07-06 NOTE — PROGRESS NOTE ADULT - ASSESSMENT
53M h/o seizure d/o, aortic dissection s/p AVR, aortic graft revision 3/20/23, 2nd stage TEVAR 5/5/23, course c/b peripancreatic/RP hemorrhage/hematoma formation s/p multiple washout and recurrent respiratory failure with most recent reintubation on 6/3 due to excessive secretions and difficulty protecting airway    #Acute hypoxemic respiratory failure  #Pulmonary edema  #Aspiration pneumonia   #Critical illness neuropathy/myopathy    Patient with hypoxemic respiratory failure from critical illness myopathy requiring tracheostomy placement 6/6 and was decannulated 6/29 after trial with capping. Patient appears to be acutely decompensated with evidence of pulmonary edema on POCUS with bilateral B lines which have improved. Concern for aspiration event, small L sided consolidation on POCUS. Agree with continued diuresis. Eosinophilia has since resolved. Patient has thick secretions from ET tube requiring frequent suctioning.     Recommend:   - Continue diuresis still has evidence of pulmonary edema  - Continue zosyn for Pseudomonas which is pan sensitive  - Continue CPAP trial and would trial extubation, as per primary team    Patient discussed with Dr. Coronel.

## 2023-07-06 NOTE — PROGRESS NOTE ADULT - SUBJECTIVE AND OBJECTIVE BOX
PULMONARY CONSULT SERVICE FOLLOW-UP NOTE    INTERVAL HPI:  Reviewed chart and overnight events; patient seen and examined at bedside. Intubated, not sedated. Follows commands. On CPAP.    MEDICATIONS:  Pulmonary:  albuterol/ipratropium for Nebulization 3 milliLiter(s) Nebulizer every 6 hours  sodium chloride 3%  Inhalation 4 milliLiter(s) Inhalation every 6 hours    Antimicrobials:  piperacillin/tazobactam IVPB.. 4.5 Gram(s) IV Intermittent every 8 hours    Anticoagulants:  aspirin  chewable 81 milliGRAM(s) Oral daily  heparin   Injectable 5000 Unit(s) SubCutaneous every 8 hours    Allergies  No Known Allergies    Vital Signs Last 24 Hrs  T(C): 37.6 (06 Jul 2023 09:00), Max: 38 (05 Jul 2023 12:27)  T(F): 99.7 (06 Jul 2023 09:00), Max: 100.4 (05 Jul 2023 12:27)  HR: 112 (06 Jul 2023 09:00) (82 - 121)  BP: 128/73 (06 Jul 2023 09:00) (114/69 - 150/85)  BP(mean): 96 (06 Jul 2023 09:00) (86 - 112)  RR: 26 (06 Jul 2023 09:00) (16 - 46)  SpO2: 100% (06 Jul 2023 09:00) (91% - 100%)    Parameters below as of 06 Jul 2023 10:00  Patient On (Oxygen Delivery Method): ventilator    O2 Concentration (%): 40    07-05 @ 07:01  -  07-06 @ 07:00  --------------------------------------------------------  IN: 2130.2 mL / OUT: 3275 mL / NET: -1144.8 mL    07-06 @ 07:01  -  07-06 @ 09:29  --------------------------------------------------------  IN: 239.7 mL / OUT: 393 mL / NET: -153.4 mL      Mode: CPAP with PS  FiO2: 40  PEEP: 5  PS: 5  MAP: 6.5  PIP: 10    PHYSICAL EXAM:  Constitutional: thin man, intubated, comfortable  HEENT: NC/AT; anicteric sclera; MMM  Cardiovascular: +S1/S2, RRR  Respiratory: bilateral crackles; no W/R/R, thick secretions from ETT  Gastrointestinal: soft, NT/ND  Extremities: WWP; no clubbing or cyanosis; 2+ pitting edema to knees  Vascular: 2+ radial and pedal pulses  Neurological: alert, follows commands      LABS:  ABG - ( 05 Jul 2023 05:22 )  pH, Arterial: 7.36  pH, Blood: x     /  pCO2: 38    /  pO2: 67    / HCO3: 22    / Base Excess: -3.6  /  SaO2: 95.5      CBC Full  -  ( 06 Jul 2023 05:30 )  WBC Count : 17.46 K/uL  RBC Count : 2.64 M/uL  Hemoglobin : 7.7 g/dL  Hematocrit : 25.0 %  Platelet Count - Automated : 418 K/uL  Mean Cell Volume : 94.7 fl  Mean Cell Hemoglobin : 29.2 pg  Mean Cell Hemoglobin Concentration : 30.8 gm/dL  Auto Neutrophil # : x  Auto Lymphocyte # : x  Auto Monocyte # : x  Auto Eosinophil # : x  Auto Basophil # : x  Auto Neutrophil % : x  Auto Lymphocyte % : x  Auto Monocyte % : x  Auto Eosinophil % : x  Auto Basophil % : x    07-06    138  |  109<H>  |  19  ----------------------------<  97  3.8   |  23  |  0.59    Ca    7.9<L>      06 Jul 2023 05:30  Phos  4.2     07-06  Mg     1.7     07-06            Urinalysis Basic - ( 06 Jul 2023 05:30 )    Color: x / Appearance: x / SG: x / pH: x  Gluc: 97 mg/dL / Ketone: x  / Bili: x / Urobili: x   Blood: x / Protein: x / Nitrite: x   Leuk Esterase: x / RBC: x / WBC x   Sq Epi: x / Non Sq Epi: x / Bacteria: x      RADIOLOGY & ADDITIONAL STUDIES: Reviewed.

## 2023-07-07 LAB
ANION GAP SERPL CALC-SCNC: 6 MMOL/L — SIGNIFICANT CHANGE UP (ref 5–17)
BUN SERPL-MCNC: 20 MG/DL — SIGNIFICANT CHANGE UP (ref 7–23)
CALCIUM SERPL-MCNC: 7.6 MG/DL — LOW (ref 8.4–10.5)
CHLORIDE SERPL-SCNC: 109 MMOL/L — HIGH (ref 96–108)
CO2 SERPL-SCNC: 26 MMOL/L — SIGNIFICANT CHANGE UP (ref 22–31)
CREAT SERPL-MCNC: 0.61 MG/DL — SIGNIFICANT CHANGE UP (ref 0.5–1.3)
EGFR: 114 ML/MIN/1.73M2 — SIGNIFICANT CHANGE UP
GLUCOSE BLDC GLUCOMTR-MCNC: 114 MG/DL — HIGH (ref 70–99)
GLUCOSE BLDC GLUCOMTR-MCNC: 118 MG/DL — HIGH (ref 70–99)
GLUCOSE BLDC GLUCOMTR-MCNC: 127 MG/DL — HIGH (ref 70–99)
GLUCOSE SERPL-MCNC: 113 MG/DL — HIGH (ref 70–99)
HCT VFR BLD CALC: 24.8 % — LOW (ref 39–50)
HGB BLD-MCNC: 7.8 G/DL — LOW (ref 13–17)
MAGNESIUM SERPL-MCNC: 1.9 MG/DL — SIGNIFICANT CHANGE UP (ref 1.6–2.6)
MCHC RBC-ENTMCNC: 29.7 PG — SIGNIFICANT CHANGE UP (ref 27–34)
MCHC RBC-ENTMCNC: 31.5 GM/DL — LOW (ref 32–36)
MCV RBC AUTO: 94.3 FL — SIGNIFICANT CHANGE UP (ref 80–100)
NRBC # BLD: 0 /100 WBCS — SIGNIFICANT CHANGE UP (ref 0–0)
PHOSPHATE SERPL-MCNC: 4.2 MG/DL — SIGNIFICANT CHANGE UP (ref 2.5–4.5)
PLATELET # BLD AUTO: 462 K/UL — HIGH (ref 150–400)
POTASSIUM SERPL-MCNC: 4 MMOL/L — SIGNIFICANT CHANGE UP (ref 3.5–5.3)
POTASSIUM SERPL-SCNC: 4 MMOL/L — SIGNIFICANT CHANGE UP (ref 3.5–5.3)
RBC # BLD: 2.63 M/UL — LOW (ref 4.2–5.8)
RBC # FLD: 17.7 % — HIGH (ref 10.3–14.5)
SODIUM SERPL-SCNC: 141 MMOL/L — SIGNIFICANT CHANGE UP (ref 135–145)
WBC # BLD: 18.66 K/UL — HIGH (ref 3.8–10.5)
WBC # FLD AUTO: 18.66 K/UL — HIGH (ref 3.8–10.5)

## 2023-07-07 PROCEDURE — 99291 CRITICAL CARE FIRST HOUR: CPT | Mod: GC

## 2023-07-07 PROCEDURE — 71045 X-RAY EXAM CHEST 1 VIEW: CPT | Mod: 26

## 2023-07-07 RX ORDER — SCOPALAMINE 1 MG/3D
1 PATCH, EXTENDED RELEASE TRANSDERMAL
Refills: 0 | Status: DISCONTINUED | OUTPATIENT
Start: 2023-07-07 | End: 2023-07-12

## 2023-07-07 RX ORDER — DEXMEDETOMIDINE HYDROCHLORIDE IN 0.9% SODIUM CHLORIDE 4 UG/ML
0.3 INJECTION INTRAVENOUS
Qty: 400 | Refills: 0 | Status: DISCONTINUED | OUTPATIENT
Start: 2023-07-07 | End: 2023-07-09

## 2023-07-07 RX ORDER — FUROSEMIDE 40 MG
40 TABLET ORAL ONCE
Refills: 0 | Status: COMPLETED | OUTPATIENT
Start: 2023-07-07 | End: 2023-07-07

## 2023-07-07 RX ADMIN — DEXMEDETOMIDINE HYDROCHLORIDE IN 0.9% SODIUM CHLORIDE 6.83 MICROGRAM(S)/KG/HR: 4 INJECTION INTRAVENOUS at 21:03

## 2023-07-07 RX ADMIN — CHLORHEXIDINE GLUCONATE 15 MILLILITER(S): 213 SOLUTION TOPICAL at 17:29

## 2023-07-07 RX ADMIN — SCOPALAMINE 1 PATCH: 1 PATCH, EXTENDED RELEASE TRANSDERMAL at 11:08

## 2023-07-07 RX ADMIN — Medication 15 MILLILITER(S): at 11:05

## 2023-07-07 RX ADMIN — Medication 2 MILLIGRAM(S): at 11:06

## 2023-07-07 RX ADMIN — HEPARIN SODIUM 5000 UNIT(S): 5000 INJECTION INTRAVENOUS; SUBCUTANEOUS at 21:06

## 2023-07-07 RX ADMIN — Medication 3 MILLILITER(S): at 15:24

## 2023-07-07 RX ADMIN — Medication 3 MILLILITER(S): at 10:45

## 2023-07-07 RX ADMIN — Medication 2 MILLIGRAM(S): at 17:28

## 2023-07-07 RX ADMIN — SODIUM CHLORIDE 4 MILLILITER(S): 9 INJECTION INTRAMUSCULAR; INTRAVENOUS; SUBCUTANEOUS at 15:24

## 2023-07-07 RX ADMIN — Medication 3 MILLILITER(S): at 21:10

## 2023-07-07 RX ADMIN — SODIUM CHLORIDE 4 MILLILITER(S): 9 INJECTION INTRAMUSCULAR; INTRAVENOUS; SUBCUTANEOUS at 21:10

## 2023-07-07 RX ADMIN — OXYCODONE HYDROCHLORIDE 5 MILLIGRAM(S): 5 TABLET ORAL at 11:51

## 2023-07-07 RX ADMIN — PROPOFOL 5.46 MICROGRAM(S)/KG/MIN: 10 INJECTION, EMULSION INTRAVENOUS at 05:42

## 2023-07-07 RX ADMIN — OXYCODONE HYDROCHLORIDE 5 MILLIGRAM(S): 5 TABLET ORAL at 05:21

## 2023-07-07 RX ADMIN — Medication 975 MILLIGRAM(S): at 05:36

## 2023-07-07 RX ADMIN — Medication 975 MILLIGRAM(S): at 17:44

## 2023-07-07 RX ADMIN — MEROPENEM 100 MILLIGRAM(S): 1 INJECTION INTRAVENOUS at 05:21

## 2023-07-07 RX ADMIN — Medication 1 DROP(S): at 17:28

## 2023-07-07 RX ADMIN — Medication 975 MILLIGRAM(S): at 11:51

## 2023-07-07 RX ADMIN — OXYCODONE HYDROCHLORIDE 5 MILLIGRAM(S): 5 TABLET ORAL at 17:44

## 2023-07-07 RX ADMIN — Medication 975 MILLIGRAM(S): at 05:21

## 2023-07-07 RX ADMIN — OXYCODONE HYDROCHLORIDE 5 MILLIGRAM(S): 5 TABLET ORAL at 05:36

## 2023-07-07 RX ADMIN — OXYCODONE HYDROCHLORIDE 5 MILLIGRAM(S): 5 TABLET ORAL at 17:27

## 2023-07-07 RX ADMIN — Medication 81 MILLIGRAM(S): at 11:03

## 2023-07-07 RX ADMIN — Medication 975 MILLIGRAM(S): at 11:02

## 2023-07-07 RX ADMIN — Medication 40 MILLIGRAM(S): at 18:32

## 2023-07-07 RX ADMIN — PANTOPRAZOLE SODIUM 40 MILLIGRAM(S): 20 TABLET, DELAYED RELEASE ORAL at 11:04

## 2023-07-07 RX ADMIN — Medication 2 MILLIGRAM(S): at 05:22

## 2023-07-07 RX ADMIN — OXYCODONE HYDROCHLORIDE 5 MILLIGRAM(S): 5 TABLET ORAL at 11:03

## 2023-07-07 RX ADMIN — Medication 2 MILLIGRAM(S): at 23:13

## 2023-07-07 RX ADMIN — OXYCODONE HYDROCHLORIDE 5 MILLIGRAM(S): 5 TABLET ORAL at 23:28

## 2023-07-07 RX ADMIN — Medication 975 MILLIGRAM(S): at 17:27

## 2023-07-07 RX ADMIN — Medication 975 MILLIGRAM(S): at 23:28

## 2023-07-07 RX ADMIN — OXYCODONE HYDROCHLORIDE 5 MILLIGRAM(S): 5 TABLET ORAL at 23:13

## 2023-07-07 RX ADMIN — MEROPENEM 100 MILLIGRAM(S): 1 INJECTION INTRAVENOUS at 17:28

## 2023-07-07 RX ADMIN — Medication 1 DROP(S): at 05:22

## 2023-07-07 RX ADMIN — CHLORHEXIDINE GLUCONATE 15 MILLILITER(S): 213 SOLUTION TOPICAL at 05:22

## 2023-07-07 RX ADMIN — DEXMEDETOMIDINE HYDROCHLORIDE IN 0.9% SODIUM CHLORIDE 6.83 MICROGRAM(S)/KG/HR: 4 INJECTION INTRAVENOUS at 11:57

## 2023-07-07 RX ADMIN — HEPARIN SODIUM 5000 UNIT(S): 5000 INJECTION INTRAVENOUS; SUBCUTANEOUS at 05:22

## 2023-07-07 RX ADMIN — Medication 3 MILLILITER(S): at 04:55

## 2023-07-07 RX ADMIN — Medication 975 MILLIGRAM(S): at 23:13

## 2023-07-07 RX ADMIN — HEPARIN SODIUM 5000 UNIT(S): 5000 INJECTION INTRAVENOUS; SUBCUTANEOUS at 14:38

## 2023-07-07 RX ADMIN — SODIUM CHLORIDE 4 MILLILITER(S): 9 INJECTION INTRAMUSCULAR; INTRAVENOUS; SUBCUTANEOUS at 10:45

## 2023-07-07 RX ADMIN — DEXMEDETOMIDINE HYDROCHLORIDE IN 0.9% SODIUM CHLORIDE 6.83 MICROGRAM(S)/KG/HR: 4 INJECTION INTRAVENOUS at 07:05

## 2023-07-07 RX ADMIN — LACOSAMIDE 200 MILLIGRAM(S): 50 TABLET ORAL at 06:30

## 2023-07-07 RX ADMIN — SODIUM CHLORIDE 4 MILLILITER(S): 9 INJECTION INTRAMUSCULAR; INTRAVENOUS; SUBCUTANEOUS at 04:55

## 2023-07-07 RX ADMIN — SCOPALAMINE 1 PATCH: 1 PATCH, EXTENDED RELEASE TRANSDERMAL at 18:00

## 2023-07-07 RX ADMIN — LACOSAMIDE 200 MILLIGRAM(S): 50 TABLET ORAL at 17:29

## 2023-07-07 RX ADMIN — CHLORHEXIDINE GLUCONATE 1 APPLICATION(S): 213 SOLUTION TOPICAL at 11:04

## 2023-07-07 NOTE — PROGRESS NOTE ADULT - ASSESSMENT
53M w PMHx of HTN, type A aortic dissection s/p Dacron grafts, AV resuspension in 2013, CAD s/p CABG x 1 SVG to RCA (5/2013 with Dr. Medina), seizure disorder, recent admission 3/20-4/14 for replacement of transverse aortic arch, second stage TEVAR and aorto-axillary bypass, AV replacement (bio 23mm), and CABG x 1 (SVG-RCA). Pt found to have endo leak and taken to OR for TEVAR revision on 5/5. Post op course c/b Klebsiella bacteremia (5/15), resp failure requiring intubation on 5/18, Mucus plugging s/p Bronch 5/19 and PEA arrest. Post operatively pt also found to have hemorrhagic pancreatitis with venu-pancreatic abscess possibly 2* to Depakote therefore s/p IR aspiration of peripancreatic fluid collection and paracentesis on 5/22, IR venu-pancreatic abscess drainage and placement of drainage catheter on 5/24. Pt then transferred from CTICU to SICU for septic shock, and further mgmt of hemorrhagic pancreatitis on 5/25. s/p IR placement of 2 new drainage catheters and catheter exchange on 5/26. LUQ drainage 5/30. OR 5/31 for exlap washout & replacement of 3 new JPs and abthera vac with open abdomen. RTOR 6/1, no bleeding, evac of old blood, placement of feeding J-tube, failed extubation due to PNA, completed ABX course and s/p trach 6/5 and decannulation 6/28. stepped down, but returned to SICU (7/2) for respiratory distress. Emergently intubated (7/3) for aspiration pneumonia.     NEURO: Sedation: Propfol gtt, standing OxyIR 5q4h w Dilaudid breakthrough, continue wean as tolerated. Hx seizures: cont vimpat. Avoid depakote due to drug-related pancreatitis risk.   HEENT: Artificial tears, Torticollis improved  CV: s/p PEA arrest on 5/24 night. TTE (6/14) LVEF 75%, LVH, biatrial enlargement and elevated PA pressure (elevated from previous), mild to mod MR/TR . Cont ASA 81mg. Metoprolol stopped due to low BP after starting propofol. total body fluid overloaded, cont diurese with lasix for goal net negative I&O.   PULM: Prior ARDS/PNA resolved and trach decannulated 6/28. Acute hypoxic respiratory failure, secondary to R side aspiration PNA, emergently intubated (7/3), sputum cx grew pseudomonas, restarted meropenem with empiric vanco per ID. follow vanc levels. cont Duonebs, 3% saline nebs, doing CPAP 5/5/40% trial.   GI/FEN: TF Vital 1.0 at 70cc via feeding J-tube with imodium 2mg TID, MTV, Protonix. Hemorrhagic pancreatitis: s/p multiple drain placements and paracentisis by IR team. Cdiff negative (6/19).  : acute renal failure- been off CVVHD since mid June with renal recovery. coulter for strict I&O  HEME: Acute blood loss anemia: hg stable.   ENDO: mISS  ID: Sepsis without shock, Pseudomonas in Sputum: Meropenem(7/2-7/5) and Vanc (7/2-7/5). Zosyn (7/5 -) for pseudmmonal PNA.   - PRIOR ABX: Ertapenem (6/13-6/16, 6/18-6/23), meropenem (6/9-6/13), vanco (6/9, 6/18-6/22), caspofungin (6/9-6/12, 6/18-6/21), completed Ceftriaxone( 6/5- 6/15) Chloe (5/21-6/5),  Caspo (5/23-5/30, 6/18-20), Ampicillin (5/21- 5/27).   - Prior Cultures: Kleb bacteremia from 5/15 & 5/17. bronch cx kleb, enterobacter (zosyn, erta resistant), E faecalis, strep angionosus from 5/19, pancreatic abscess cx pan-sensitive kleb 5/22.   PPX: SCDs, SQH  LINES: PIVs // L rad kalpana (5/31-6/15), 5Fr PIV in LUE (6/13-15), Lsubclav HD Cath (5/31-6/12), RIJ TLC (5/22-5/27), RIJ HD cath (5/22-31), R Ax kalpana(5/12-5/31), LIJ TLC (5/23-6/1), RIJ TLC (6/1-13)   WOUNDS/DRAINS: CODIE removed. midline incision healed.   PT: will request again when pt off propofol awake alert able to participate.   Dispo: SICU     53M w PMHx of HTN, type A aortic dissection s/p Dacron grafts, AV resuspension in 2013, CAD s/p CABG x 1 SVG to RCA (5/2013 with Dr. Medina), seizure disorder, recent admission 3/20-4/14 for replacement of transverse aortic arch, second stage TEVAR and aorto-axillary bypass, AV replacement (bio 23mm), and CABG x 1 (SVG-RCA). Pt found to have endo leak and taken to OR for TEVAR revision on 5/5. Post op course c/b Klebsiella bacteremia (5/15), resp failure requiring intubation on 5/18, Mucus plugging s/p Bronch 5/19 and PEA arrest. Post operatively pt also found to have hemorrhagic pancreatitis with venu-pancreatic abscess possibly 2* to Depakote therefore s/p IR aspiration of peripancreatic fluid collection and paracentesis on 5/22, IR venu-pancreatic abscess drainage and placement of drainage catheter on 5/24. Pt then transferred from CTICU to SICU for septic shock, and further mgmt of hemorrhagic pancreatitis on 5/25. s/p IR placement of 2 new drainage catheters and catheter exchange on 5/26. LUQ drainage 5/30. OR 5/31 for exlap washout & replacement of 3 new JPs and abthera vac with open abdomen. RTOR 6/1, no bleeding, evac of old blood, placement of feeding J-tube, failed extubation due to PNA, completed ABX course and s/p trach 6/5 and decannulation 6/28. stepped down, but returned to SICU (7/2) for respiratory distress. Emergently intubated (7/3) for aspiration pneumonia.      53M w PMHx of HTN, type A aortic dissection s/p Dacron grafts, AV resuspension in 2013, CAD s/p CABG x 1 SVG to RCA (5/2013 with Dr. Medina), seizure disorder, recent admission 3/20-4/14 for replacement of transverse aortic arch, second stage TEVAR and aorto-axillary bypass, AV replacement (bio 23mm), and CABG x 1 (SVG-RCA). Pt found to have endo leak and taken to OR for TEVAR revision on 5/5. Post op course c/b Klebsiella bacteremia (5/15), resp failure requiring intubation on 5/18, Mucus plugging s/p Bronch 5/19 and PEA arrest. Post operatively pt also found to have hemorrhagic pancreatitis with venu-pancreatic abscess possibly 2* to Depakote therefore s/p IR aspiration of peripancreatic fluid collection and paracentesis on 5/22, IR venu-pancreatic abscess drainage and placement of drainage catheter on 5/24. Pt then transferred from CTICU to SICU for septic shock, and further mgmt of hemorrhagic pancreatitis on 5/25. s/p IR placement of 2 new drainage catheters and catheter exchange on 5/26. LUQ drainage 5/30. OR 5/31 for exlap washout & replacement of 3 new JPs and abthera vac with open abdomen. RTOR 6/1, no bleeding, evac of old blood, placement of feeding J-tube, failed extubation due to PNA, completed ABX course and s/p trach 6/5 and decannulation 6/28. stepped down, but returned to SICU (7/2) for respiratory distress. Emergently intubated (7/3) for aspiration pneumonia, sputum cx grew pseudomonas.     NEURO: Sedation: wean OxyIR down to 5q6h w/ Dilaudid breakthrough, switch from propofol to precedex, Hx seizures: cont vimpat. Avoid depakote due to drug-related pancreatitis risk.   HEENT: Artificial tears, Torticollis improved  CV: s/p PEA arrest on 5/24 night. TTE (6/14) LVEF 75%, LVH, biatrial enlargement and elevated PA pressure (elevated from previous), mild to mod MR/TR . Cont ASA 81mg. Metoprolol stopped due to low BP after starting propofol. total body fluid overloaded, cont diurese with lasix for goal net negative I&O.   PULM: Prior ARDS/PNA resolved and trach decannulated 6/28. Acute hypoxic respiratory failure, secondary to R side aspiration PNA, emergently intubated (7/3), restarted meropenem with empiric vanco per ID, sputum cx grew pseudomonas, abx deescalated to zosyn, stopped vanco. but restarting meropenem per Dr Solano request. cont Duonebs, 3% saline nebs, tolerating CPAP 5/5/40%, but has lots of secretions. hold of on extubating until secretions improves. trial of scopolamine patch for secretions.   GI/FEN: TF Vital 1.0 at 70cc via feeding J-tube, diarrhea on imodium, MTV, Protonix. Hemorrhagic pancreatitis: s/p multiple drain placements and paracentisis by IR team. Cdiff negative (6/19).  : acute renal failure- been off CVVHD since mid June with renal recovery. coulter for strict I&O  HEME: Acute blood loss anemia: hg stable.   ENDO: mISS  ID: Sepsis without shock, Pseudomonas in Sputum: deescalate from Meropenem & vanc to Zosyn (7/5) for pseudmmonal PNA. back on meropenem per Surgeon request.   - PRIOR ABX: Ertapenem (6/13-6/16, 6/18-6/23), meropenem (5/21-6/5, 6/9-6/13, 7/2-7/5), vanco (6/9, 6/18-6/22, 7/2-7/5), caspofungin (5/23-5/30, 6/9-6/12, 6/18-6/21), Ceftriaxone( 6/5- 6/15), Ampicillin (5/21- 5/27).   - Prior Cultures: Kleb bacteremia from 5/15 & 5/17. bronch cx kleb, enterobacter (zosyn, erta resistant), E faecalis, strep angionosus from 5/19, pancreatic abscess cx pan-sensitive kleb 5/22.   PPX: SCDs, SQH  LINES: PIVs // L rad kalpana (5/31-6/15), 5Fr PIV in LUE (6/13-15), Lsubclav HD Cath (5/31-6/12), RIJ TLC (5/22-5/27), RIJ HD cath (5/22-31), R Ax kalpana(5/12-5/31), LIJ TLC (5/23-6/1), RIJ TLC (6/1-13)   WOUNDS/DRAINS: CODIE removed. midline incision healed.   PT: PT/OT consult requested for early mobilization.   Dispo: SICU

## 2023-07-07 NOTE — PROGRESS NOTE ADULT - SUBJECTIVE AND OBJECTIVE BOX
S: No new issues/events overnight, no new med c/o    O: ICU Vital Signs Last 24 Hrs  T(F): 99.7 (07-07-23 @ 09:39), Max: 100.8 (07-07-23 @ 05:39)  HR: 93 (07-07-23 @ 10:00) (86 - 115)  BP: 129/80 (07-07-23 @ 10:00) (96/54 - 158/89)  BP(mean): 99 (07-07-23 @ 10:00) (69 - 118)  ABP: --  RR: 18 (07-07-23 @ 10:00) (13 - 22)  SpO2: 100% (07-07-23 @ 10:00) (100% - 100%)    PHYSICAL EXAM:   Neurological: AAOx3, CNII-XII intact,  strength 5/5 b/l  ENT: mucus membrane moist  Cardiovascular: RRR  Respiratory: CTA  Gastrointestinal: soft, NT, ND, BS+  Extremities: warm, no dependent edema  Vascular: no cyanosis/erythema  Skin: no rashes  MSK: no joint swelling.     LABS:    07-07    141  |  109<H>  |  20  ----------------------------<  113<H>  4.0   |  26  |  0.61    Ca    7.6<L>      07 Jul 2023 05:30  Phos  4.2     07-07  Mg     1.9     07-07                          7.8    18.66 )-----------( 462      ( 07 Jul 2023 05:30 )             24.8     Urinalysis Basic - ( 07 Jul 2023 05:30 )    Color: x / Appearance: x / SG: x / pH: x  Gluc: 113 mg/dL / Ketone: x  / Bili: x / Urobili: x   Blood: x / Protein: x / Nitrite: x   Leuk Esterase: x / RBC: x / WBC x   Sq Epi: x / Non Sq Epi: x / Bacteria: x    CAPILLARY BLOOD GLUCOSE      POCT Blood Glucose.: 114 mg/dL (07 Jul 2023 11:07)  POCT Blood Glucose.: 107 mg/dL (06 Jul 2023 22:50)  POCT Blood Glucose.: 110 mg/dL (06 Jul 2023 17:52)  POCT Blood Glucose.: 112 mg/dL (06 Jul 2023 17:27)  POCT Blood Glucose.: 104 mg/dL (06 Jul 2023 11:46)    MEDICATIONS  (STANDING):  acetaminophen     Tablet .. 975 milliGRAM(s) Oral every 6 hours  albuterol/ipratropium for Nebulization 3 milliLiter(s) Nebulizer every 6 hours  artificial  tears Solution 1 Drop(s) Both EYES two times a day  aspirin  chewable 81 milliGRAM(s) Oral daily  chlorhexidine 0.12% Liquid 15 milliLiter(s) Oral Mucosa every 12 hours  chlorhexidine 2% Cloths 1 Application(s) Topical daily  dexMEDEtomidine Infusion 0.3 MICROgram(s)/kG/Hr (6.83 mL/Hr) IV Continuous <Continuous>  dextrose 5%. 1000 milliLiter(s) (100 mL/Hr) IV Continuous <Continuous>  dextrose 50% Injectable 25 Gram(s) IV Push once  glucagon  Injectable 1 milliGRAM(s) IntraMuscular once  heparin   Injectable 5000 Unit(s) SubCutaneous every 8 hours  insulin lispro (ADMELOG) corrective regimen sliding scale   SubCutaneous every 6 hours  lacosamide Solution 200 milliGRAM(s) Oral two times a day  loperamide Liquid 2 milliGRAM(s) Enteral Tube every 6 hours  meropenem  IVPB 1000 milliGRAM(s) IV Intermittent every 8 hours  multivitamin/minerals/iron Oral Solution (CENTRUM) 15 milliLiter(s) Oral daily  oxyCODONE    Solution 5 milliGRAM(s) Oral every 6 hours  pantoprazole  Injectable 40 milliGRAM(s) IV Push daily  propofol Infusion 10 MICROgram(s)/kG/Min (5.46 mL/Hr) IV Continuous <Continuous>  scopolamine 1 mG/72 Hr(s) Patch 1 Patch Transdermal every 72 hours  sodium chloride 3%  Inhalation 4 milliLiter(s) Inhalation every 6 hours    MEDICATIONS  (PRN):  dextrose Oral Gel 15 Gram(s) Oral once PRN Blood Glucose LESS THAN 70 milliGRAM(s)/deciliter  HYDROmorphone  Injectable 0.5 milliGRAM(s) IV Push every 4 hours PRN breakthrough pain  ondansetron Injectable 4 milliGRAM(s) IV Push every 8 hours PRN Nausea and/or Vomiting  sodium chloride 0.9% lock flush 10 milliLiter(s) IV Push every 1 hour PRN Pre/post blood products, medications, blood draw, and to maintain line patency      Cantrell:	  [ ] None	[ ] Daily Cantrell Order Placed	   Indication:	  [ ] Strict I and O's    [ ] Obstruction     [ ] Incontinence + Stage 3 or 4 Decubitus  Central Line:  [ ] None	   [ ]  Medication / TPN Administration     [ ] No Peripheral IV

## 2023-07-07 NOTE — CHART NOTE - NSCHARTNOTEFT_GEN_A_CORE
Nutrition follow-up.    Pt seen with team daily during AM Rounds. EN as primary means to nutrition, receiving Vital 1.0 @70ml/hr via J-tube x 24hrs [provides 1680ml total volume, 1680 kcals, 67.2g protein, 1401ml free water daily + LPS BID [200kcals, 30g protein]. Pt remains total fluid overloaded. TMax: 38.2 C. HR trending high, BP WNL. MAPs >65 mmHg. On propofol [current rate provides 288 kcals daily]. w/ intermittent diarrhea per team, on imodium. Intubated/sedated, lots of secretions. Current provision provides 23.2 kcals/kg & 1.2 g protein/kg IBW.     Current EN provision inappropriate in light of pts condition, volume status, GI function however no plans to change at present per Dr. Solano due to concern for hyperosmolar feeds via J-tube. No data/guidelines to support hyperosmolar feeds through J-tube as independent risk factor for GI intolerance or further negative outcomes. RD consistently monitoring s/s GI intolerance, adjusting TF recs based on clinical status. Pt would benefit from more concentrated solution at lower rate and daily volume to improve BM, give less volume/free water and better meet needs.     Recommend Vital 1.5 @50ml/hr x 24hrs [1200ml total volume, 1800 kcals, 81g protein, 918ml free water daily] + 1 LPS BID [200 kcals, 30g protein]. Monitor propofol infusion & adjust provision prn to prevent over/underfeeding. Hold feeds if increase in pressor requirements, MAPs consistently trending <60 mmHg, lactic acidosis present, GI intolerance is noted.  >>If continuing current EN formula/rate per MD- recommend LPS TID [300 kcals, 45g protein]    RD to continue to follow at high risk, reconsult prn [ext. [id number]]

## 2023-07-07 NOTE — PROGRESS NOTE ADULT - ASSESSMENT
53M w PMHx of HTN, type A aortic dissection s/p Dacron grafts, AV resuspension in 2013, CAD s/p CABG x 1 SVG to RCA (5/2013 with Dr. Medina), seizure disorder, recent admission 3/20-4/14 for replacement of transverse aortic arch, second stage TEVAR and aorto-axillary bypass, AV replacement (bio 23mm), and CABG x 1 (SVG-RCA). Pt found to have endo leak and taken to OR for TEVAR revision on 5/5. Post op course c/b Klebsiella bacteremia (5/15), resp failure requiring intubation on 5/18, Mucus plugging s/p Bronch 5/19 and PEA arrest. Post operatively pt also found to have hemorrhagic pancreatitis with venu-pancreatic abscess possibly 2* to Depakote therefore s/p IR aspiration of peripancreatic fluid collection and paracentesis on 5/22, IR venu-pancreatic abscess drainage and placement of drainage catheter on 5/24. Pt then transferred from CTICU to SICU for septic shock, and further mgmt of hemorrhagic pancreatitis on 5/25. s/p IR placement of 2 new drainage catheters and catheter exchange on 5/26. LUQ drainage 5/30. OR 5/31 for exlap washout & replacement of 3 new JPs and abthera vac with open abdomen. RTOR 6/1, no bleeding, evac of old blood, placement of feeding J-tube, failed extubation due to PNA--now completed ABX course ans s/p trach 6/5 and decannulation 6/28. Transferred to SICU (7/2) for respiratory distress. Emergently intubated (7/3) for aspiration pneumonia growing pseudomonas on culture.     Plan   - Continue Meropenem until WBC downtrending   - Wean off sedation to extubate   - Rest of care as per SICU team   - Team 1 will follow

## 2023-07-07 NOTE — PROGRESS NOTE ADULT - SUBJECTIVE AND OBJECTIVE BOX
SUBJECTIVE: Patient seen at bed side during morning rounds. No acute changes over night     MEDICATIONS  (STANDING):  acetaminophen     Tablet .. 975 milliGRAM(s) Oral every 6 hours  albuterol/ipratropium for Nebulization 3 milliLiter(s) Nebulizer every 6 hours  artificial  tears Solution 1 Drop(s) Both EYES two times a day  aspirin  chewable 81 milliGRAM(s) Oral daily  chlorhexidine 0.12% Liquid 15 milliLiter(s) Oral Mucosa every 12 hours  chlorhexidine 2% Cloths 1 Application(s) Topical daily  dexMEDEtomidine Infusion 0.3 MICROgram(s)/kG/Hr (6.83 mL/Hr) IV Continuous <Continuous>  dextrose 5%. 1000 milliLiter(s) (100 mL/Hr) IV Continuous <Continuous>  dextrose 50% Injectable 25 Gram(s) IV Push once  glucagon  Injectable 1 milliGRAM(s) IntraMuscular once  heparin   Injectable 5000 Unit(s) SubCutaneous every 8 hours  insulin lispro (ADMELOG) corrective regimen sliding scale   SubCutaneous every 6 hours  lacosamide Solution 200 milliGRAM(s) Oral two times a day  loperamide Liquid 2 milliGRAM(s) Enteral Tube every 6 hours  meropenem  IVPB 1000 milliGRAM(s) IV Intermittent every 8 hours  multivitamin/minerals/iron Oral Solution (CENTRUM) 15 milliLiter(s) Oral daily  oxyCODONE    Solution 5 milliGRAM(s) Oral every 6 hours  pantoprazole  Injectable 40 milliGRAM(s) IV Push daily  propofol Infusion 10 MICROgram(s)/kG/Min (5.46 mL/Hr) IV Continuous <Continuous>  scopolamine 1 mG/72 Hr(s) Patch 1 Patch Transdermal every 72 hours  sodium chloride 3%  Inhalation 4 milliLiter(s) Inhalation every 6 hours    MEDICATIONS  (PRN):  dextrose Oral Gel 15 Gram(s) Oral once PRN Blood Glucose LESS THAN 70 milliGRAM(s)/deciliter  HYDROmorphone  Injectable 0.5 milliGRAM(s) IV Push every 4 hours PRN breakthrough pain  ondansetron Injectable 4 milliGRAM(s) IV Push every 8 hours PRN Nausea and/or Vomiting  sodium chloride 0.9% lock flush 10 milliLiter(s) IV Push every 1 hour PRN Pre/post blood products, medications, blood draw, and to maintain line patency      Drips:     ICU Vital Signs Last 24 Hrs  T(C): 37.6 (07 Jul 2023 13:17), Max: 38.2 (07 Jul 2023 05:39)  T(F): 99.7 (07 Jul 2023 13:17), Max: 100.8 (07 Jul 2023 05:39)  HR: 74 (07 Jul 2023 14:00) (74 - 115)  BP: 101/62 (07 Jul 2023 14:00) (96/54 - 158/89)  BP(mean): 76 (07 Jul 2023 14:00) (69 - 118)  ABP: --  ABP(mean): --  RR: 13 (07 Jul 2023 14:00) (13 - 22)  SpO2: 100% (07 Jul 2023 14:00) (100% - 100%)    O2 Parameters below as of 07 Jul 2023 15:00  Patient On (Oxygen Delivery Method): ventilator, CPAP    O2 Concentration (%): 40          Physical Exam:  Neurological: lightly sedated with propofol. arousible easily by voice. strength 5/5 b/l  ENT: mucus membrane moist  Cardiovascular: RRR  Respiratory: bilateral crackles  Gastrointestinal: soft, NT, ND, BS+, midline incision healing, no erythema, no bleeding, no pus.   Extremities: warm, 2+ dependent edema  Vascular: no cyanosis/erythema  Skin: no rashes  MSK: no joint swelling.    Lines/tubes/drains:  Cantrell:	      Vent settings:  Mode: CPAP with PS, FiO2: 40, PEEP: 5, PS: 5    I&O's Summary    06 Jul 2023 07:01  -  07 Jul 2023 07:00  --------------------------------------------------------  IN: 2561 mL / OUT: 4128 mL / NET: -1567 mL    07 Jul 2023 07:01  -  07 Jul 2023 14:38  --------------------------------------------------------  IN: 588.4 mL / OUT: 223 mL / NET: 365.4 mL        LABS:                        7.8    18.66 )-----------( 462      ( 07 Jul 2023 05:30 )             24.8     07-07    141  |  109<H>  |  20  ----------------------------<  113<H>  4.0   |  26  |  0.61    Ca    7.6<L>      07 Jul 2023 05:30  Phos  4.2     07-07  Mg     1.9     07-07        Urinalysis Basic - ( 07 Jul 2023 05:30 )    Color: x / Appearance: x / SG: x / pH: x  Gluc: 113 mg/dL / Ketone: x  / Bili: x / Urobili: x   Blood: x / Protein: x / Nitrite: x   Leuk Esterase: x / RBC: x / WBC x   Sq Epi: x / Non Sq Epi: x / Bacteria: x      CAPILLARY BLOOD GLUCOSE      POCT Blood Glucose.: 114 mg/dL (07 Jul 2023 11:07)  POCT Blood Glucose.: 107 mg/dL (06 Jul 2023 22:50)  POCT Blood Glucose.: 110 mg/dL (06 Jul 2023 17:52)  POCT Blood Glucose.: 112 mg/dL (06 Jul 2023 17:27)        Cultures:    RADIOLOGY & ADDITIONAL STUDIES:

## 2023-07-08 LAB
ANION GAP SERPL CALC-SCNC: 9 MMOL/L — SIGNIFICANT CHANGE UP (ref 5–17)
BUN SERPL-MCNC: 20 MG/DL — SIGNIFICANT CHANGE UP (ref 7–23)
CALCIUM SERPL-MCNC: 8.3 MG/DL — LOW (ref 8.4–10.5)
CHLORIDE SERPL-SCNC: 107 MMOL/L — SIGNIFICANT CHANGE UP (ref 96–108)
CO2 SERPL-SCNC: 25 MMOL/L — SIGNIFICANT CHANGE UP (ref 22–31)
CREAT SERPL-MCNC: 0.5 MG/DL — SIGNIFICANT CHANGE UP (ref 0.5–1.3)
CULTURE RESULTS: SIGNIFICANT CHANGE UP
EGFR: 121 ML/MIN/1.73M2 — SIGNIFICANT CHANGE UP
GLUCOSE BLDC GLUCOMTR-MCNC: 121 MG/DL — HIGH (ref 70–99)
GLUCOSE BLDC GLUCOMTR-MCNC: 126 MG/DL — HIGH (ref 70–99)
GLUCOSE BLDC GLUCOMTR-MCNC: 126 MG/DL — HIGH (ref 70–99)
GLUCOSE SERPL-MCNC: 124 MG/DL — HIGH (ref 70–99)
HCT VFR BLD CALC: 23.2 % — LOW (ref 39–50)
HGB BLD-MCNC: 7.4 G/DL — LOW (ref 13–17)
MAGNESIUM SERPL-MCNC: 1.8 MG/DL — SIGNIFICANT CHANGE UP (ref 1.6–2.6)
MCHC RBC-ENTMCNC: 30 PG — SIGNIFICANT CHANGE UP (ref 27–34)
MCHC RBC-ENTMCNC: 31.9 GM/DL — LOW (ref 32–36)
MCV RBC AUTO: 93.9 FL — SIGNIFICANT CHANGE UP (ref 80–100)
NRBC # BLD: 0 /100 WBCS — SIGNIFICANT CHANGE UP (ref 0–0)
PHOSPHATE SERPL-MCNC: 4.7 MG/DL — HIGH (ref 2.5–4.5)
PLATELET # BLD AUTO: 474 K/UL — HIGH (ref 150–400)
POTASSIUM SERPL-MCNC: 4.2 MMOL/L — SIGNIFICANT CHANGE UP (ref 3.5–5.3)
POTASSIUM SERPL-SCNC: 4.2 MMOL/L — SIGNIFICANT CHANGE UP (ref 3.5–5.3)
RBC # BLD: 2.47 M/UL — LOW (ref 4.2–5.8)
RBC # FLD: 17.5 % — HIGH (ref 10.3–14.5)
SODIUM SERPL-SCNC: 141 MMOL/L — SIGNIFICANT CHANGE UP (ref 135–145)
SPECIMEN SOURCE: SIGNIFICANT CHANGE UP
WBC # BLD: 13.76 K/UL — HIGH (ref 3.8–10.5)
WBC # FLD AUTO: 13.76 K/UL — HIGH (ref 3.8–10.5)

## 2023-07-08 PROCEDURE — 71045 X-RAY EXAM CHEST 1 VIEW: CPT | Mod: 26

## 2023-07-08 PROCEDURE — 99291 CRITICAL CARE FIRST HOUR: CPT | Mod: GC

## 2023-07-08 RX ORDER — MAGNESIUM SULFATE 500 MG/ML
2 VIAL (ML) INJECTION ONCE
Refills: 0 | Status: COMPLETED | OUTPATIENT
Start: 2023-07-08 | End: 2023-07-08

## 2023-07-08 RX ORDER — LOPERAMIDE HCL 2 MG
2 TABLET ORAL EVERY 6 HOURS
Refills: 0 | Status: DISCONTINUED | OUTPATIENT
Start: 2023-07-08 | End: 2023-07-19

## 2023-07-08 RX ORDER — MAGNESIUM SULFATE 500 MG/ML
1 VIAL (ML) INJECTION ONCE
Refills: 0 | Status: DISCONTINUED | OUTPATIENT
Start: 2023-07-08 | End: 2023-07-08

## 2023-07-08 RX ORDER — ROBINUL 0.2 MG/ML
0.2 INJECTION INTRAMUSCULAR; INTRAVENOUS EVERY 6 HOURS
Refills: 0 | Status: DISCONTINUED | OUTPATIENT
Start: 2023-07-08 | End: 2023-07-09

## 2023-07-08 RX ORDER — FUROSEMIDE 40 MG
40 TABLET ORAL ONCE
Refills: 0 | Status: COMPLETED | OUTPATIENT
Start: 2023-07-08 | End: 2023-07-08

## 2023-07-08 RX ADMIN — OXYCODONE HYDROCHLORIDE 5 MILLIGRAM(S): 5 TABLET ORAL at 17:44

## 2023-07-08 RX ADMIN — CHLORHEXIDINE GLUCONATE 15 MILLILITER(S): 213 SOLUTION TOPICAL at 05:29

## 2023-07-08 RX ADMIN — SODIUM CHLORIDE 4 MILLILITER(S): 9 INJECTION INTRAMUSCULAR; INTRAVENOUS; SUBCUTANEOUS at 17:40

## 2023-07-08 RX ADMIN — MEROPENEM 100 MILLIGRAM(S): 1 INJECTION INTRAVENOUS at 00:01

## 2023-07-08 RX ADMIN — LACOSAMIDE 200 MILLIGRAM(S): 50 TABLET ORAL at 17:46

## 2023-07-08 RX ADMIN — Medication 975 MILLIGRAM(S): at 18:00

## 2023-07-08 RX ADMIN — Medication 3 MILLILITER(S): at 23:24

## 2023-07-08 RX ADMIN — Medication 25 GRAM(S): at 07:13

## 2023-07-08 RX ADMIN — SODIUM CHLORIDE 4 MILLILITER(S): 9 INJECTION INTRAMUSCULAR; INTRAVENOUS; SUBCUTANEOUS at 23:24

## 2023-07-08 RX ADMIN — CHLORHEXIDINE GLUCONATE 1 APPLICATION(S): 213 SOLUTION TOPICAL at 11:16

## 2023-07-08 RX ADMIN — Medication 975 MILLIGRAM(S): at 17:44

## 2023-07-08 RX ADMIN — DEXMEDETOMIDINE HYDROCHLORIDE IN 0.9% SODIUM CHLORIDE 6.83 MICROGRAM(S)/KG/HR: 4 INJECTION INTRAVENOUS at 19:06

## 2023-07-08 RX ADMIN — HEPARIN SODIUM 5000 UNIT(S): 5000 INJECTION INTRAVENOUS; SUBCUTANEOUS at 05:28

## 2023-07-08 RX ADMIN — Medication 975 MILLIGRAM(S): at 11:15

## 2023-07-08 RX ADMIN — Medication 81 MILLIGRAM(S): at 11:15

## 2023-07-08 RX ADMIN — SCOPALAMINE 1 PATCH: 1 PATCH, EXTENDED RELEASE TRANSDERMAL at 18:54

## 2023-07-08 RX ADMIN — LACOSAMIDE 200 MILLIGRAM(S): 50 TABLET ORAL at 07:07

## 2023-07-08 RX ADMIN — DEXMEDETOMIDINE HYDROCHLORIDE IN 0.9% SODIUM CHLORIDE 6.83 MICROGRAM(S)/KG/HR: 4 INJECTION INTRAVENOUS at 14:31

## 2023-07-08 RX ADMIN — MEROPENEM 100 MILLIGRAM(S): 1 INJECTION INTRAVENOUS at 17:44

## 2023-07-08 RX ADMIN — SODIUM CHLORIDE 4 MILLILITER(S): 9 INJECTION INTRAMUSCULAR; INTRAVENOUS; SUBCUTANEOUS at 09:24

## 2023-07-08 RX ADMIN — ROBINUL 0.2 MILLIGRAM(S): 0.2 INJECTION INTRAMUSCULAR; INTRAVENOUS at 23:02

## 2023-07-08 RX ADMIN — PANTOPRAZOLE SODIUM 40 MILLIGRAM(S): 20 TABLET, DELAYED RELEASE ORAL at 11:15

## 2023-07-08 RX ADMIN — Medication 2 MILLIGRAM(S): at 05:29

## 2023-07-08 RX ADMIN — Medication 1 DROP(S): at 17:49

## 2023-07-08 RX ADMIN — HEPARIN SODIUM 5000 UNIT(S): 5000 INJECTION INTRAVENOUS; SUBCUTANEOUS at 21:58

## 2023-07-08 RX ADMIN — OXYCODONE HYDROCHLORIDE 5 MILLIGRAM(S): 5 TABLET ORAL at 11:15

## 2023-07-08 RX ADMIN — CHLORHEXIDINE GLUCONATE 15 MILLILITER(S): 213 SOLUTION TOPICAL at 17:43

## 2023-07-08 RX ADMIN — OXYCODONE HYDROCHLORIDE 5 MILLIGRAM(S): 5 TABLET ORAL at 05:43

## 2023-07-08 RX ADMIN — SODIUM CHLORIDE 4 MILLILITER(S): 9 INJECTION INTRAMUSCULAR; INTRAVENOUS; SUBCUTANEOUS at 05:57

## 2023-07-08 RX ADMIN — Medication 1 DROP(S): at 05:29

## 2023-07-08 RX ADMIN — OXYCODONE HYDROCHLORIDE 5 MILLIGRAM(S): 5 TABLET ORAL at 18:00

## 2023-07-08 RX ADMIN — OXYCODONE HYDROCHLORIDE 5 MILLIGRAM(S): 5 TABLET ORAL at 05:28

## 2023-07-08 RX ADMIN — Medication 2 MILLIGRAM(S): at 23:03

## 2023-07-08 RX ADMIN — MEROPENEM 100 MILLIGRAM(S): 1 INJECTION INTRAVENOUS at 09:23

## 2023-07-08 RX ADMIN — ROBINUL 0.2 MILLIGRAM(S): 0.2 INJECTION INTRAMUSCULAR; INTRAVENOUS at 11:15

## 2023-07-08 RX ADMIN — ROBINUL 0.2 MILLIGRAM(S): 0.2 INJECTION INTRAMUSCULAR; INTRAVENOUS at 17:49

## 2023-07-08 RX ADMIN — Medication 975 MILLIGRAM(S): at 05:43

## 2023-07-08 RX ADMIN — Medication 3 MILLILITER(S): at 05:58

## 2023-07-08 RX ADMIN — SCOPALAMINE 1 PATCH: 1 PATCH, EXTENDED RELEASE TRANSDERMAL at 08:20

## 2023-07-08 RX ADMIN — Medication 975 MILLIGRAM(S): at 05:28

## 2023-07-08 RX ADMIN — HEPARIN SODIUM 5000 UNIT(S): 5000 INJECTION INTRAVENOUS; SUBCUTANEOUS at 13:18

## 2023-07-08 RX ADMIN — Medication 3 MILLILITER(S): at 09:24

## 2023-07-08 RX ADMIN — Medication 975 MILLIGRAM(S): at 23:02

## 2023-07-08 RX ADMIN — Medication 975 MILLIGRAM(S): at 11:30

## 2023-07-08 RX ADMIN — OXYCODONE HYDROCHLORIDE 5 MILLIGRAM(S): 5 TABLET ORAL at 11:30

## 2023-07-08 RX ADMIN — HYDROMORPHONE HYDROCHLORIDE 0.5 MILLIGRAM(S): 2 INJECTION INTRAMUSCULAR; INTRAVENOUS; SUBCUTANEOUS at 18:00

## 2023-07-08 RX ADMIN — OXYCODONE HYDROCHLORIDE 5 MILLIGRAM(S): 5 TABLET ORAL at 22:01

## 2023-07-08 RX ADMIN — DEXMEDETOMIDINE HYDROCHLORIDE IN 0.9% SODIUM CHLORIDE 6.83 MICROGRAM(S)/KG/HR: 4 INJECTION INTRAVENOUS at 21:59

## 2023-07-08 RX ADMIN — OXYCODONE HYDROCHLORIDE 5 MILLIGRAM(S): 5 TABLET ORAL at 23:01

## 2023-07-08 RX ADMIN — HYDROMORPHONE HYDROCHLORIDE 0.5 MILLIGRAM(S): 2 INJECTION INTRAMUSCULAR; INTRAVENOUS; SUBCUTANEOUS at 17:35

## 2023-07-08 RX ADMIN — DEXMEDETOMIDINE HYDROCHLORIDE IN 0.9% SODIUM CHLORIDE 6.83 MICROGRAM(S)/KG/HR: 4 INJECTION INTRAVENOUS at 07:19

## 2023-07-08 RX ADMIN — Medication 15 MILLILITER(S): at 11:16

## 2023-07-08 RX ADMIN — Medication 40 MILLIGRAM(S): at 01:52

## 2023-07-08 RX ADMIN — DEXMEDETOMIDINE HYDROCHLORIDE IN 0.9% SODIUM CHLORIDE 6.83 MICROGRAM(S)/KG/HR: 4 INJECTION INTRAVENOUS at 02:51

## 2023-07-08 RX ADMIN — Medication 3 MILLILITER(S): at 16:28

## 2023-07-08 NOTE — PROGRESS NOTE ADULT - SUBJECTIVE AND OBJECTIVE BOX
Interval Events:  Patient seen and examined at bedside.      Allergies    No Known Allergies    Intolerances        Vital Signs Last 24 Hrs  T(C): 36.7 (08 Jul 2023 09:00), Max: 37.6 (07 Jul 2023 13:17)  T(F): 98.1 (08 Jul 2023 09:00), Max: 99.7 (07 Jul 2023 13:17)  HR: 68 (08 Jul 2023 11:00) (67 - 89)  BP: 139/76 (08 Jul 2023 11:00) (96/59 - 154/83)  BP(mean): 102 (08 Jul 2023 11:00) (72 - 113)  RR: 15 (08 Jul 2023 11:00) (12 - 27)  SpO2: 100% (08 Jul 2023 11:00) (99% - 100%)    Parameters below as of 08 Jul 2023 12:00  Patient On (Oxygen Delivery Method): ventilator, CPAP    O2 Concentration (%): 40    07-07 @ 07:01  -  07-08 @ 07:00  --------------------------------------------------------  IN: 2284.1 mL / OUT: 3198 mL / NET: -913.9 mL    07-08 @ 07:01  -  07-08 @ 11:18  --------------------------------------------------------  IN: 441.2 mL / OUT: 155 mL / NET: 286.2 mL      07-07 @ 07:01  -  07-08 @ 07:00  --------------------------------------------------------  IN: 2284.1 mL / OUT: 3198 mL / NET: -913.9 mL    07-08 @ 07:01  -  07-08 @ 11:18  --------------------------------------------------------  IN: 441.2 mL / OUT: 155 mL / NET: 286.2 mL        Physical Exam:     Neurological: lightly sedated arousible easily by voice. strength 5/5 b/l  ENT: mucus membrane moist  Cardiovascular: RRR  Respiratory: CTA  Gastrointestinal: soft, NT, ND, BS+, midline incision healing, no erythema, no bleeding, no pus.   Extremities: warm, 2+ dependent edema  Vascular: no cyanosis/erythema  Skin: no rashes  MSK: no joint swelling.     LABS:      CBC Full  -  ( 08 Jul 2023 05:16 )  WBC Count : 13.76 K/uL  RBC Count : 2.47 M/uL  Hemoglobin : 7.4 g/dL  Hematocrit : 23.2 %  Platelet Count - Automated : 474 K/uL  Mean Cell Volume : 93.9 fl  Mean Cell Hemoglobin : 30.0 pg  Mean Cell Hemoglobin Concentration : 31.9 gm/dL  Auto Neutrophil # : x  Auto Lymphocyte # : x  Auto Monocyte # : x  Auto Eosinophil # : x  Auto Basophil # : x  Auto Neutrophil % : x  Auto Lymphocyte % : x  Auto Monocyte % : x  Auto Eosinophil % : x  Auto Basophil % : x    07-08    141  |  107  |  20  ----------------------------<  124<H>  4.2   |  25  |  0.50    Ca    8.3<L>      08 Jul 2023 05:16  Phos  4.7     07-08  Mg     1.8     07-08            Urinalysis Basic - ( 08 Jul 2023 05:16 )    Color: x / Appearance: x / SG: x / pH: x  Gluc: 124 mg/dL / Ketone: x  / Bili: x / Urobili: x   Blood: x / Protein: x / Nitrite: x   Leuk Esterase: x / RBC: x / WBC x   Sq Epi: x / Non Sq Epi: x / Bacteria: x              RADIOLOGY & ADDITIONAL STUDIES (The following images were personally reviewed):        53M w PMHx of HTN, type A aortic dissection s/p Dacron grafts, AV resuspension in 2013, CAD s/p CABG x 1 SVG to RCA (5/2013 with Dr. Medina), seizure disorder, recent admission 3/20-4/14 for replacement of transverse aortic arch, second stage TEVAR and aorto-axillary bypass, AV replacement (bio 23mm), and CABG x 1 (SVG-RCA). Pt found to have endo leak and taken to OR for TEVAR revision on 5/5. Post op course c/b Klebsiella bacteremia (5/15), resp failure requiring intubation on 5/18, Mucus plugging s/p Bronch 5/19 and PEA arrest. Post operatively pt also found to have hemorrhagic pancreatitis with venu-pancreatic abscess possibly 2* to Depakote therefore s/p IR aspiration of peripancreatic fluid collection and paracentesis on 5/22, IR venu-pancreatic abscess drainage and placement of drainage catheter on 5/24. Pt then transferred from CTICU to SICU for septic shock, and further mgmt of hemorrhagic pancreatitis on 5/25. s/p IR placement of 2 new drainage catheters and catheter exchange on 5/26. LUQ drainage 5/30. OR 5/31 for exlap washout & replacement of 3 new JPs and abthera vac with open abdomen. RTOR 6/1, no bleeding, evac of old blood, placement of feeding J-tube, failed extubation due to PNA, completed ABX course and s/p trach 6/5 and decannulation 6/28. stepped down, but returned to SICU (7/2) for respiratory distress. Emergently intubated (7/3) for aspiration pneumonia, sputum cx grew pseudomonas.     NEURO: Sedation: wean OxyIR down to 5q6h w/ Dilaudid breakthrough, Cont precedex, Hx seizures: cont vimpat. Avoid depakote due to drug-related pancreatitis risk.   HEENT: Artificial tears, Torticollis improved  CV: s/p PEA arrest on 5/24 night. TTE (6/14) LVEF 75%, LVH, biatrial enlargement and elevated PA pressure (elevated from previous), mild to mod MR/TR . Cont ASA 81mg. Metoprolol stopped due to low BP after starting propofol. total body fluid overloaded, cont diurese with lasix for goal net negative I&O. Hgb < 8 as per Surgical team no need to transfuse  PULM: Prior ARDS/PNA resolved and trach decannulated 6/28. Acute hypoxic respiratory failure, secondary to aspiration PNA, emergently intubated (7/3), Cont Duonebs, 3% saline nebs, tolerating CPAP 5/5/40%, but has lots of secretions. hold off on extubating until secretions improves. trial of scopolamine patch for secretions, glycopyrrolate q6h for secretions.  GI/FEN: TF Vital 1.0 at 70cc via feeding J-tube, diarrhea on imodium, MTV, Protonix. Hemorrhagic pancreatitis: s/p multiple drain placements and paracentisis by IR team. Cdiff negative (6/19).  :  S/p acute renal failure-now resolved been off CVVHD since mid June with renal recovery. remove coulter today- Voids  HEME: Acute blood loss anemia: hg stable.   ENDO: mISS  ID: Sepsis without shock, Pseudomonas in Sputum: on Meropenem( 7/7-) as per Surgical team request.     - PRIOR ABX: Ertapenem (6/13-6/16, 6/18-6/23), meropenem (5/21-6/5, 6/9-6/13, 7/2-7/5), vanco (6/9, 6/18-6/22, 7/2-7/5), caspofungin (5/23-5/30, 6/9-6/12, 6/18-6/21), Ceftriaxone( 6/5- 6/15), Ampicillin (5/21- 5/27). Zosyn: ( 7/- 7/7)   - Prior Cultures: Kleb bacteremia from 5/15 & 5/17. bronch cx kleb, enterobacter (zosyn, erta resistant), E faecalis, strep angionosus from 5/19, pancreatic abscess cx pan-sensitive kleb 5/22.   PPX: SCDs, SQH  LINES: PIVs // L rad kalpana (5/31-6/15), 5Fr PIV in LUE (6/13-15), Lsubclav HD Cath (5/31-6/12), RIJ TLC (5/22-5/27), RIJ HD cath (5/22-31), R Ax kalpana(5/12-5/31), [facility]J TLC (5/23-6/1), RIJ TLC (6/1-13)   WOUNDS/DRAINS: CODIE removed. midline incision healed.   PT: PT/OT consult requested for early mobilization.   Dispo: SICU

## 2023-07-08 NOTE — PROGRESS NOTE ADULT - SUBJECTIVE AND OBJECTIVE BOX
SUBJECTIVE:      MEDICATIONS  (STANDING):  acetaminophen     Tablet .. 975 milliGRAM(s) Oral every 6 hours  albuterol/ipratropium for Nebulization 3 milliLiter(s) Nebulizer every 6 hours  artificial  tears Solution 1 Drop(s) Both EYES two times a day  aspirin  chewable 81 milliGRAM(s) Oral daily  chlorhexidine 0.12% Liquid 15 milliLiter(s) Oral Mucosa every 12 hours  chlorhexidine 2% Cloths 1 Application(s) Topical daily  dexMEDEtomidine Infusion 0.3 MICROgram(s)/kG/Hr (6.83 mL/Hr) IV Continuous <Continuous>  dextrose 5%. 1000 milliLiter(s) (100 mL/Hr) IV Continuous <Continuous>  dextrose 50% Injectable 25 Gram(s) IV Push once  glucagon  Injectable 1 milliGRAM(s) IntraMuscular once  glycopyrrolate Injectable 0.2 milliGRAM(s) IV Push every 6 hours  heparin   Injectable 5000 Unit(s) SubCutaneous every 8 hours  insulin lispro (ADMELOG) corrective regimen sliding scale   SubCutaneous every 6 hours  lacosamide Solution 200 milliGRAM(s) Oral two times a day  loperamide Liquid 2 milliGRAM(s) Enteral Tube every 6 hours  meropenem  IVPB 1000 milliGRAM(s) IV Intermittent every 8 hours  multivitamin/minerals/iron Oral Solution (CENTRUM) 15 milliLiter(s) Oral daily  oxyCODONE    Solution 5 milliGRAM(s) Oral every 6 hours  pantoprazole  Injectable 40 milliGRAM(s) IV Push daily  scopolamine 1 mG/72 Hr(s) Patch 1 Patch Transdermal every 72 hours  sodium chloride 3%  Inhalation 4 milliLiter(s) Inhalation every 6 hours    MEDICATIONS  (PRN):  dextrose Oral Gel 15 Gram(s) Oral once PRN Blood Glucose LESS THAN 70 milliGRAM(s)/deciliter  HYDROmorphone  Injectable 0.5 milliGRAM(s) IV Push every 4 hours PRN breakthrough pain  ondansetron Injectable 4 milliGRAM(s) IV Push every 8 hours PRN Nausea and/or Vomiting  sodium chloride 0.9% lock flush 10 milliLiter(s) IV Push every 1 hour PRN Pre/post blood products, medications, blood draw, and to maintain line patency      Vital Signs Last 24 Hrs  T(C): 36.7 (08 Jul 2023 09:00), Max: 37.6 (07 Jul 2023 09:39)  T(F): 98.1 (08 Jul 2023 09:00), Max: 99.7 (07 Jul 2023 09:39)  HR: 68 (08 Jul 2023 09:00) (67 - 93)  BP: 126/70 (08 Jul 2023 09:00) (96/59 - 154/83)  BP(mean): 92 (08 Jul 2023 09:00) (72 - 113)  RR: 21 (08 Jul 2023 09:00) (12 - 27)  SpO2: 100% (08 Jul 2023 09:00) (99% - 100%)    Parameters below as of 08 Jul 2023 10:00  Patient On (Oxygen Delivery Method): ventilator, CPAP    O2 Concentration (%): 40    Physical Exam:  General: NAD, resting comfortably in bed  Pulmonary: Nonlabored breathing, no respiratory distress  Cardiovascular: NSR  Abdominal: soft, NT/ND  Extremities: WWP, normal strength  Neuro: A/O x 3, CNs II-XII grossly intact, no focal deficits    I&O's Summary    07 Jul 2023 07:01  -  08 Jul 2023 07:00  --------------------------------------------------------  IN: 2284.1 mL / OUT: 3198 mL / NET: -913.9 mL    08 Jul 2023 07:01  -  08 Jul 2023 09:26  --------------------------------------------------------  IN: 255.6 mL / OUT: 85 mL / NET: 170.6 mL        LABS:                        7.4    13.76 )-----------( 474      ( 08 Jul 2023 05:16 )             23.2     07-08    141  |  107  |  20  ----------------------------<  124<H>  4.2   |  25  |  0.50    Ca    8.3<L>      08 Jul 2023 05:16  Phos  4.7     07-08  Mg     1.8     07-08        Urinalysis Basic - ( 08 Jul 2023 05:16 )    Color: x / Appearance: x / SG: x / pH: x  Gluc: 124 mg/dL / Ketone: x  / Bili: x / Urobili: x   Blood: x / Protein: x / Nitrite: x   Leuk Esterase: x / RBC: x / WBC x   Sq Epi: x / Non Sq Epi: x / Bacteria: x      CAPILLARY BLOOD GLUCOSE      POCT Blood Glucose.: 118 mg/dL (07 Jul 2023 23:33)  POCT Blood Glucose.: 127 mg/dL (07 Jul 2023 17:21)  POCT Blood Glucose.: 114 mg/dL (07 Jul 2023 11:07)        RADIOLOGY & ADDITIONAL STUDIES:   SUBJECTIVE:  Pt seen on rounds this AM. No changes overnight.     MEDICATIONS  (STANDING):  acetaminophen     Tablet .. 975 milliGRAM(s) Oral every 6 hours  albuterol/ipratropium for Nebulization 3 milliLiter(s) Nebulizer every 6 hours  artificial  tears Solution 1 Drop(s) Both EYES two times a day  aspirin  chewable 81 milliGRAM(s) Oral daily  chlorhexidine 0.12% Liquid 15 milliLiter(s) Oral Mucosa every 12 hours  chlorhexidine 2% Cloths 1 Application(s) Topical daily  dexMEDEtomidine Infusion 0.3 MICROgram(s)/kG/Hr (6.83 mL/Hr) IV Continuous <Continuous>  dextrose 5%. 1000 milliLiter(s) (100 mL/Hr) IV Continuous <Continuous>  dextrose 50% Injectable 25 Gram(s) IV Push once  glucagon  Injectable 1 milliGRAM(s) IntraMuscular once  glycopyrrolate Injectable 0.2 milliGRAM(s) IV Push every 6 hours  heparin   Injectable 5000 Unit(s) SubCutaneous every 8 hours  insulin lispro (ADMELOG) corrective regimen sliding scale   SubCutaneous every 6 hours  lacosamide Solution 200 milliGRAM(s) Oral two times a day  loperamide Liquid 2 milliGRAM(s) Enteral Tube every 6 hours  meropenem  IVPB 1000 milliGRAM(s) IV Intermittent every 8 hours  multivitamin/minerals/iron Oral Solution (CENTRUM) 15 milliLiter(s) Oral daily  oxyCODONE    Solution 5 milliGRAM(s) Oral every 6 hours  pantoprazole  Injectable 40 milliGRAM(s) IV Push daily  scopolamine 1 mG/72 Hr(s) Patch 1 Patch Transdermal every 72 hours  sodium chloride 3%  Inhalation 4 milliLiter(s) Inhalation every 6 hours    MEDICATIONS  (PRN):  dextrose Oral Gel 15 Gram(s) Oral once PRN Blood Glucose LESS THAN 70 milliGRAM(s)/deciliter  HYDROmorphone  Injectable 0.5 milliGRAM(s) IV Push every 4 hours PRN breakthrough pain  ondansetron Injectable 4 milliGRAM(s) IV Push every 8 hours PRN Nausea and/or Vomiting  sodium chloride 0.9% lock flush 10 milliLiter(s) IV Push every 1 hour PRN Pre/post blood products, medications, blood draw, and to maintain line patency      Vital Signs Last 24 Hrs  T(C): 36.7 (08 Jul 2023 09:00), Max: 37.6 (07 Jul 2023 09:39)  T(F): 98.1 (08 Jul 2023 09:00), Max: 99.7 (07 Jul 2023 09:39)  HR: 68 (08 Jul 2023 09:00) (67 - 93)  BP: 126/70 (08 Jul 2023 09:00) (96/59 - 154/83)  BP(mean): 92 (08 Jul 2023 09:00) (72 - 113)  RR: 21 (08 Jul 2023 09:00) (12 - 27)  SpO2: 100% (08 Jul 2023 09:00) (99% - 100%)    Parameters below as of 08 Jul 2023 10:00  Patient On (Oxygen Delivery Method): ventilator, CPAP    O2 Concentration (%): 40    Physical Exam:  General: NAD, resting in bed  Pulmonary: Nonlabored breathing, no respiratory distress  Cardiovascular: NSR  Abdominal: soft, NT/ND  Extremities: WWP, normal strength  Neuro: A/O x 3, CNs II-XII grossly intact, no focal deficits    I&O's Summary    07 Jul 2023 07:01  -  08 Jul 2023 07:00  --------------------------------------------------------  IN: 2284.1 mL / OUT: 3198 mL / NET: -913.9 mL    08 Jul 2023 07:01  -  08 Jul 2023 09:26  --------------------------------------------------------  IN: 255.6 mL / OUT: 85 mL / NET: 170.6 mL        LABS:                        7.4    13.76 )-----------( 474      ( 08 Jul 2023 05:16 )             23.2     07-08    141  |  107  |  20  ----------------------------<  124<H>  4.2   |  25  |  0.50    Ca    8.3<L>      08 Jul 2023 05:16  Phos  4.7     07-08  Mg     1.8     07-08        Urinalysis Basic - ( 08 Jul 2023 05:16 )    Color: x / Appearance: x / SG: x / pH: x  Gluc: 124 mg/dL / Ketone: x  / Bili: x / Urobili: x   Blood: x / Protein: x / Nitrite: x   Leuk Esterase: x / RBC: x / WBC x   Sq Epi: x / Non Sq Epi: x / Bacteria: x      CAPILLARY BLOOD GLUCOSE      POCT Blood Glucose.: 118 mg/dL (07 Jul 2023 23:33)  POCT Blood Glucose.: 127 mg/dL (07 Jul 2023 17:21)  POCT Blood Glucose.: 114 mg/dL (07 Jul 2023 11:07)        RADIOLOGY & ADDITIONAL STUDIES:

## 2023-07-08 NOTE — PROGRESS NOTE ADULT - ASSESSMENT
53M w PMHx of HTN, type A aortic dissection s/p Dacron grafts, AV resuspension in 2013, CAD s/p CABG x 1 SVG to RCA (5/2013 with Dr. Medina), seizure disorder, recent admission 3/20-4/14 for replacement of transverse aortic arch, second stage TEVAR and aorto-axillary bypass, AV replacement (bio 23mm), and CABG x 1 (SVG-RCA). Pt found to have endo leak and taken to OR for TEVAR revision on 5/5. Post op course c/b Klebsiella bacteremia (5/15), resp failure requiring intubation on 5/18, Mucus plugging s/p Bronch 5/19 and PEA arrest. Post operatively pt also found to have hemorrhagic pancreatitis with venu-pancreatic abscess possibly 2* to Depakote therefore s/p IR aspiration of peripancreatic fluid collection and paracentesis on 5/22, IR venu-pancreatic abscess drainage and placement of drainage catheter on 5/24. Pt then transferred from CTICU to SICU for septic shock, and further mgmt of hemorrhagic pancreatitis on 5/25. s/p IR placement of 2 new drainage catheters and catheter exchange on 5/26. LUQ drainage 5/30. OR 5/31 for exlap washout & replacement of 3 new JPs and abthera vac with open abdomen. RTOR 6/1, no bleeding, evac of old blood, placement of feeding J-tube, failed extubation due to PNA--now completed ABX course ans s/p trach 6/5 and decannulation 6/28. Transferred to SICU (7/2) for respiratory distress. Emergently intubated (7/3) for aspiration pneumonia growing pseudomonas on culture.     Plan   - wean to extubate at ICU discretion   - Continue Meropenem until WBC downtrending    - Rest of care as per SICU team   - Team 1 will follow

## 2023-07-09 LAB
ANION GAP SERPL CALC-SCNC: 12 MMOL/L — SIGNIFICANT CHANGE UP (ref 5–17)
BUN SERPL-MCNC: 21 MG/DL — SIGNIFICANT CHANGE UP (ref 7–23)
CALCIUM SERPL-MCNC: 8.2 MG/DL — LOW (ref 8.4–10.5)
CHLORIDE SERPL-SCNC: 109 MMOL/L — HIGH (ref 96–108)
CO2 SERPL-SCNC: 21 MMOL/L — LOW (ref 22–31)
CREAT SERPL-MCNC: 0.48 MG/DL — LOW (ref 0.5–1.3)
EGFR: 123 ML/MIN/1.73M2 — SIGNIFICANT CHANGE UP
GLUCOSE BLDC GLUCOMTR-MCNC: 100 MG/DL — HIGH (ref 70–99)
GLUCOSE BLDC GLUCOMTR-MCNC: 102 MG/DL — HIGH (ref 70–99)
GLUCOSE BLDC GLUCOMTR-MCNC: 108 MG/DL — HIGH (ref 70–99)
GLUCOSE BLDC GLUCOMTR-MCNC: 126 MG/DL — HIGH (ref 70–99)
GLUCOSE SERPL-MCNC: 114 MG/DL — HIGH (ref 70–99)
HCT VFR BLD CALC: 24.6 % — LOW (ref 39–50)
HGB BLD-MCNC: 7.7 G/DL — LOW (ref 13–17)
MAGNESIUM SERPL-MCNC: 2.1 MG/DL — SIGNIFICANT CHANGE UP (ref 1.6–2.6)
MCHC RBC-ENTMCNC: 29.1 PG — SIGNIFICANT CHANGE UP (ref 27–34)
MCHC RBC-ENTMCNC: 31.3 GM/DL — LOW (ref 32–36)
MCV RBC AUTO: 92.8 FL — SIGNIFICANT CHANGE UP (ref 80–100)
NRBC # BLD: 0 /100 WBCS — SIGNIFICANT CHANGE UP (ref 0–0)
PHOSPHATE SERPL-MCNC: 4.2 MG/DL — SIGNIFICANT CHANGE UP (ref 2.5–4.5)
PLATELET # BLD AUTO: 467 K/UL — HIGH (ref 150–400)
POTASSIUM SERPL-MCNC: 4.9 MMOL/L — SIGNIFICANT CHANGE UP (ref 3.5–5.3)
POTASSIUM SERPL-SCNC: 4.9 MMOL/L — SIGNIFICANT CHANGE UP (ref 3.5–5.3)
RBC # BLD: 2.65 M/UL — LOW (ref 4.2–5.8)
RBC # FLD: 17.2 % — HIGH (ref 10.3–14.5)
SODIUM SERPL-SCNC: 142 MMOL/L — SIGNIFICANT CHANGE UP (ref 135–145)
WBC # BLD: 14.66 K/UL — HIGH (ref 3.8–10.5)
WBC # FLD AUTO: 14.66 K/UL — HIGH (ref 3.8–10.5)

## 2023-07-09 PROCEDURE — 93010 ELECTROCARDIOGRAM REPORT: CPT

## 2023-07-09 PROCEDURE — 99291 CRITICAL CARE FIRST HOUR: CPT | Mod: GC

## 2023-07-09 PROCEDURE — 71045 X-RAY EXAM CHEST 1 VIEW: CPT | Mod: 26

## 2023-07-09 RX ORDER — OXYCODONE HYDROCHLORIDE 5 MG/1
5 TABLET ORAL EVERY 6 HOURS
Refills: 0 | Status: DISCONTINUED | OUTPATIENT
Start: 2023-07-09 | End: 2023-07-09

## 2023-07-09 RX ORDER — ROBINUL 0.2 MG/ML
0.1 INJECTION INTRAMUSCULAR; INTRAVENOUS EVERY 6 HOURS
Refills: 0 | Status: DISCONTINUED | OUTPATIENT
Start: 2023-07-09 | End: 2023-07-13

## 2023-07-09 RX ORDER — OXYCODONE HYDROCHLORIDE 5 MG/1
5 TABLET ORAL EVERY 6 HOURS
Refills: 0 | Status: DISCONTINUED | OUTPATIENT
Start: 2023-07-09 | End: 2023-07-13

## 2023-07-09 RX ORDER — SODIUM CHLORIDE 9 MG/ML
250 INJECTION, SOLUTION INTRAVENOUS ONCE
Refills: 0 | Status: COMPLETED | OUTPATIENT
Start: 2023-07-09 | End: 2023-07-09

## 2023-07-09 RX ORDER — OXYCODONE HYDROCHLORIDE 5 MG/1
5 TABLET ORAL EVERY 12 HOURS
Refills: 0 | Status: DISCONTINUED | OUTPATIENT
Start: 2023-07-09 | End: 2023-07-12

## 2023-07-09 RX ORDER — OXYCODONE HYDROCHLORIDE 5 MG/1
5 TABLET ORAL EVERY 12 HOURS
Refills: 0 | Status: DISCONTINUED | OUTPATIENT
Start: 2023-07-09 | End: 2023-07-09

## 2023-07-09 RX ADMIN — MEROPENEM 100 MILLIGRAM(S): 1 INJECTION INTRAVENOUS at 09:09

## 2023-07-09 RX ADMIN — OXYCODONE HYDROCHLORIDE 5 MILLIGRAM(S): 5 TABLET ORAL at 12:20

## 2023-07-09 RX ADMIN — LACOSAMIDE 200 MILLIGRAM(S): 50 TABLET ORAL at 17:20

## 2023-07-09 RX ADMIN — OXYCODONE HYDROCHLORIDE 5 MILLIGRAM(S): 5 TABLET ORAL at 17:56

## 2023-07-09 RX ADMIN — Medication 975 MILLIGRAM(S): at 06:18

## 2023-07-09 RX ADMIN — SODIUM CHLORIDE 4 MILLILITER(S): 9 INJECTION INTRAMUSCULAR; INTRAVENOUS; SUBCUTANEOUS at 10:33

## 2023-07-09 RX ADMIN — Medication 1 DROP(S): at 17:58

## 2023-07-09 RX ADMIN — LACOSAMIDE 200 MILLIGRAM(S): 50 TABLET ORAL at 05:18

## 2023-07-09 RX ADMIN — ROBINUL 0.1 MILLIGRAM(S): 0.2 INJECTION INTRAMUSCULAR; INTRAVENOUS at 23:39

## 2023-07-09 RX ADMIN — CHLORHEXIDINE GLUCONATE 15 MILLILITER(S): 213 SOLUTION TOPICAL at 05:19

## 2023-07-09 RX ADMIN — Medication 3 MILLILITER(S): at 21:04

## 2023-07-09 RX ADMIN — Medication 975 MILLIGRAM(S): at 00:02

## 2023-07-09 RX ADMIN — DEXMEDETOMIDINE HYDROCHLORIDE IN 0.9% SODIUM CHLORIDE 6.83 MICROGRAM(S)/KG/HR: 4 INJECTION INTRAVENOUS at 09:09

## 2023-07-09 RX ADMIN — Medication 2 MILLIGRAM(S): at 05:20

## 2023-07-09 RX ADMIN — Medication 975 MILLIGRAM(S): at 23:39

## 2023-07-09 RX ADMIN — PANTOPRAZOLE SODIUM 40 MILLIGRAM(S): 20 TABLET, DELAYED RELEASE ORAL at 11:48

## 2023-07-09 RX ADMIN — Medication 1 DROP(S): at 05:20

## 2023-07-09 RX ADMIN — Medication 3 MILLILITER(S): at 17:23

## 2023-07-09 RX ADMIN — Medication 975 MILLIGRAM(S): at 05:18

## 2023-07-09 RX ADMIN — Medication 2 MILLIGRAM(S): at 17:58

## 2023-07-09 RX ADMIN — SCOPALAMINE 1 PATCH: 1 PATCH, EXTENDED RELEASE TRANSDERMAL at 07:55

## 2023-07-09 RX ADMIN — Medication 81 MILLIGRAM(S): at 11:48

## 2023-07-09 RX ADMIN — OXYCODONE HYDROCHLORIDE 5 MILLIGRAM(S): 5 TABLET ORAL at 17:20

## 2023-07-09 RX ADMIN — Medication 2 MILLIGRAM(S): at 23:39

## 2023-07-09 RX ADMIN — OXYCODONE HYDROCHLORIDE 5 MILLIGRAM(S): 5 TABLET ORAL at 11:48

## 2023-07-09 RX ADMIN — Medication 975 MILLIGRAM(S): at 17:21

## 2023-07-09 RX ADMIN — Medication 15 MILLILITER(S): at 11:49

## 2023-07-09 RX ADMIN — Medication 975 MILLIGRAM(S): at 11:48

## 2023-07-09 RX ADMIN — HEPARIN SODIUM 5000 UNIT(S): 5000 INJECTION INTRAVENOUS; SUBCUTANEOUS at 14:33

## 2023-07-09 RX ADMIN — OXYCODONE HYDROCHLORIDE 5 MILLIGRAM(S): 5 TABLET ORAL at 21:17

## 2023-07-09 RX ADMIN — Medication 3 MILLILITER(S): at 09:33

## 2023-07-09 RX ADMIN — MEROPENEM 100 MILLIGRAM(S): 1 INJECTION INTRAVENOUS at 00:12

## 2023-07-09 RX ADMIN — CHLORHEXIDINE GLUCONATE 15 MILLILITER(S): 213 SOLUTION TOPICAL at 17:22

## 2023-07-09 RX ADMIN — OXYCODONE HYDROCHLORIDE 5 MILLIGRAM(S): 5 TABLET ORAL at 05:18

## 2023-07-09 RX ADMIN — OXYCODONE HYDROCHLORIDE 5 MILLIGRAM(S): 5 TABLET ORAL at 06:18

## 2023-07-09 RX ADMIN — SODIUM CHLORIDE 4 MILLILITER(S): 9 INJECTION INTRAMUSCULAR; INTRAVENOUS; SUBCUTANEOUS at 06:15

## 2023-07-09 RX ADMIN — Medication 3 MILLILITER(S): at 06:16

## 2023-07-09 RX ADMIN — SODIUM CHLORIDE 4 MILLILITER(S): 9 INJECTION INTRAMUSCULAR; INTRAVENOUS; SUBCUTANEOUS at 17:24

## 2023-07-09 RX ADMIN — OXYCODONE HYDROCHLORIDE 5 MILLIGRAM(S): 5 TABLET ORAL at 21:31

## 2023-07-09 RX ADMIN — DEXMEDETOMIDINE HYDROCHLORIDE IN 0.9% SODIUM CHLORIDE 6.83 MICROGRAM(S)/KG/HR: 4 INJECTION INTRAVENOUS at 12:33

## 2023-07-09 RX ADMIN — ROBINUL 0.2 MILLIGRAM(S): 0.2 INJECTION INTRAMUSCULAR; INTRAVENOUS at 17:21

## 2023-07-09 RX ADMIN — ROBINUL 0.2 MILLIGRAM(S): 0.2 INJECTION INTRAMUSCULAR; INTRAVENOUS at 05:20

## 2023-07-09 RX ADMIN — Medication 975 MILLIGRAM(S): at 12:20

## 2023-07-09 RX ADMIN — ROBINUL 0.2 MILLIGRAM(S): 0.2 INJECTION INTRAMUSCULAR; INTRAVENOUS at 11:48

## 2023-07-09 RX ADMIN — SODIUM CHLORIDE 4 MILLILITER(S): 9 INJECTION INTRAMUSCULAR; INTRAVENOUS; SUBCUTANEOUS at 21:04

## 2023-07-09 RX ADMIN — HEPARIN SODIUM 5000 UNIT(S): 5000 INJECTION INTRAVENOUS; SUBCUTANEOUS at 22:04

## 2023-07-09 RX ADMIN — HEPARIN SODIUM 5000 UNIT(S): 5000 INJECTION INTRAVENOUS; SUBCUTANEOUS at 05:20

## 2023-07-09 RX ADMIN — SCOPALAMINE 1 PATCH: 1 PATCH, EXTENDED RELEASE TRANSDERMAL at 18:00

## 2023-07-09 RX ADMIN — CHLORHEXIDINE GLUCONATE 1 APPLICATION(S): 213 SOLUTION TOPICAL at 11:49

## 2023-07-09 RX ADMIN — Medication 975 MILLIGRAM(S): at 17:56

## 2023-07-09 RX ADMIN — MEROPENEM 100 MILLIGRAM(S): 1 INJECTION INTRAVENOUS at 17:20

## 2023-07-09 NOTE — PROGRESS NOTE ADULT - SUBJECTIVE AND OBJECTIVE BOX
Interval Events:  No events overnight passed TOV.   Patient seen and examined at bedside.      Allergies    No Known Allergies    Intolerances        Vital Signs Last 24 Hrs  T(C): 37.8 (09 Jul 2023 09:00), Max: 37.8 (08 Jul 2023 17:13)  T(F): 100 (09 Jul 2023 09:00), Max: 100 (08 Jul 2023 17:13)  HR: 69 (09 Jul 2023 10:00) (65 - 86)  BP: 155/75 (09 Jul 2023 10:00) (123/66 - 165/91)  BP(mean): 108 (09 Jul 2023 10:00) (89 - 121)  RR: 18 (09 Jul 2023 10:00) (14 - 24)  SpO2: 100% (09 Jul 2023 10:00) (100% - 100%)    Parameters below as of 09 Jul 2023 11:00  Patient On (Oxygen Delivery Method): ventilator, CPAP    O2 Concentration (%): 40    07-08 @ 07:01  -  07-09 @ 07:00  --------------------------------------------------------  IN: 2917.2 mL / OUT: 845 mL / NET: 2072.2 mL    07-09 @ 07:01  -  07-09 @ 11:12  --------------------------------------------------------  IN: 516 mL / OUT: 0 mL / NET: 516 mL      07-08 @ 07:01  -  07-09 @ 07:00  --------------------------------------------------------  IN: 2917.2 mL / OUT: 845 mL / NET: 2072.2 mL    07-09 @ 07:01  -  07-09 @ 11:12  --------------------------------------------------------  IN: 516 mL / OUT: 0 mL / NET: 516 mL        Physical Exam:       Neurological: lightly sedated arousible easily by voice. strength 5/5 b/l  ENT: mucus membrane moist  Cardiovascular: RRR  Respiratory: CTA  Gastrointestinal: soft, NT, ND, BS+, midline incision healing, no erythema, no bleeding, no pus.   Extremities: warm, 2+ dependent edema  Vascular: no cyanosis/erythema  Skin: no rashes  MSK: no joint swelling.   Psych: No signs of anxiety or depression      LABS:      CBC Full  -  ( 09 Jul 2023 07:15 )  WBC Count : 14.66 K/uL  RBC Count : 2.65 M/uL  Hemoglobin : 7.7 g/dL  Hematocrit : 24.6 %  Platelet Count - Automated : 467 K/uL  Mean Cell Volume : 92.8 fl  Mean Cell Hemoglobin : 29.1 pg  Mean Cell Hemoglobin Concentration : 31.3 gm/dL  Auto Neutrophil # : x  Auto Lymphocyte # : x  Auto Monocyte # : x  Auto Eosinophil # : x  Auto Basophil # : x  Auto Neutrophil % : x  Auto Lymphocyte % : x  Auto Monocyte % : x  Auto Eosinophil % : x  Auto Basophil % : x    07-09    142  |  109<H>  |  21  ----------------------------<  114<H>  4.9   |  21<L>  |  0.48<L>    Ca    8.2<L>      09 Jul 2023 07:15  Phos  4.2     07-09  Mg     2.1     07-09            Urinalysis Basic - ( 09 Jul 2023 07:15 )    Color: x / Appearance: x / SG: x / pH: x  Gluc: 114 mg/dL / Ketone: x  / Bili: x / Urobili: x   Blood: x / Protein: x / Nitrite: x   Leuk Esterase: x / RBC: x / WBC x   Sq Epi: x / Non Sq Epi: x / Bacteria: x              RADIOLOGY & ADDITIONAL STUDIES (The following images were personally reviewed):          A/p: 53M w PMHx of HTN, type A aortic dissection s/p Dacron grafts, AV resuspension in 2013, CAD s/p CABG x 1 SVG to RCA (5/2013 with Dr. Medina), seizure disorder, recent admission 3/20-4/14 for replacement of transverse aortic arch, second stage TEVAR and aorto-axillary bypass, AV replacement (bio 23mm), and CABG x 1 (SVG-RCA). Pt found to have endo leak and taken to OR for TEVAR revision on 5/5. Post op course c/b Klebsiella bacteremia (5/15), resp failure requiring intubation on 5/18, Mucus plugging s/p Bronch 5/19 and PEA arrest. Post operatively pt also found to have hemorrhagic pancreatitis with venu-pancreatic abscess possibly 2* to Depakote therefore s/p IR aspiration of peripancreatic fluid collection and paracentesis on 5/22, IR venu-pancreatic abscess drainage and placement of drainage catheter on 5/24. Pt then transferred from CTICU to SICU for septic shock, and further mgmt of hemorrhagic pancreatitis on 5/25. s/p IR placement of 2 new drainage catheters and catheter exchange on 5/26. LUQ drainage 5/30. OR 5/31 for exlap washout & replacement of 3 new JPs and abthera vac with open abdomen. RTOR 6/1, no bleeding, evac of old blood, placement of feeding J-tube, failed extubation due to PNA, completed ABX course and s/p trach 6/5 and decannulation 6/28. stepped down, but returned to SICU (7/2) for respiratory distress. Emergently intubated (7/3) for aspiration pneumonia, sputum cx grew pseudomonas.     NEURO: Sedation: wean OxyIR down to 5q6h w/ Dilaudid breakthrough, switch from propofol to precedex, Hx seizures: cont vimpat. Avoid depakote due to drug-related pancreatitis risk.   HEENT: Artificial tears, Torticollis improved  CV: s/p PEA arrest on 5/24 night. TTE (6/14) LVEF 75%, LVH, biatrial enlargement and elevated PA pressure (elevated from previous), mild to mod MR/TR . Cont ASA 81mg. Metoprolol stopped due to low BP after starting propofol. total body fluid overloaded, cont diurese with lasix for goal net negative I&O.   PULM: Prior ARDS/PNA resolved and trach decannulated 6/28. Acute hypoxic respiratory failure, secondary to aspiration PNA, emergently intubated (7/3), Cont Duonebs, 3% saline nebs, tolerating CPAP 5/5/40%, but has lots of secretions. hold off on extubating until secretions improves. trial of scopolamine patch for secretions, glycopyrrolate q6h for secretions. CPAP since yesterday - if secretions continue to improve will extubate today  GI/FEN: TF Vital 1.0 at 70cc via feeding J-tube, diarrhea on imodium, MTV, Protonix. Hemorrhagic pancreatitis: s/p multiple drain placements and paracentisis by IR team. Cdiff negative (6/19).  : acute renal failure- been off CVVHD since mid June with renal recovery. Voids.  HEME: Acute blood loss anemia: hg stable.   ENDO: mISS  ID: Sepsis without shock, Pseudomonas in Sputum: on Meropenem( 7/7-) as per Surgical team request.     - PRIOR ABX: Ertapenem (6/13-6/16, 6/18-6/23), meropenem (5/21-6/5, 6/9-6/13, 7/2-7/5), vanco (6/9, 6/18-6/22, 7/2-7/5), caspofungin (5/23-5/30, 6/9-6/12, 6/18-6/21), Ceftriaxone( 6/5- 6/15), Ampicillin (5/21- 5/27). Zosyn: ( 7/- 7/7)   - Prior Cultures: Kleb bacteremia from 5/15 & 5/17. bronch cx kleb, enterobacter (zosyn, erta resistant), E faecalis, strep angionosus from 5/19, pancreatic abscess cx pan-sensitive kleb 5/22.   PPX: SCDs, SQH  LINES: PIVs // L rad kalpana (5/31-6/15), 5Fr PIV in LUE (6/13-15), Lsubclav HD Cath (5/31-6/12), RIJ TLC (5/22-5/27), RIJ HD cath (5/22-31), R Ax kalpana(5/12-5/31), JASONJ TLC (5/23-6/1), LEONEL TLC (6/1-13)   WOUNDS/DRAINS: CODIE removed. midline incision healed.   PT: PT/OT consult requested for early mobilization.   Dispo: SICU

## 2023-07-09 NOTE — PROGRESS NOTE ADULT - SUBJECTIVE AND OBJECTIVE BOX
POST-OP DAY: X s/p      SUBJECTIVE: Patient seen and examined bedside by Surgical resident.    aspirin  chewable 81 milliGRAM(s) Oral daily  heparin   Injectable 5000 Unit(s) SubCutaneous every 8 hours  meropenem  IVPB 1000 milliGRAM(s) IV Intermittent every 8 hours    MEDICATIONS  (PRN):  dextrose Oral Gel 15 Gram(s) Oral once PRN Blood Glucose LESS THAN 70 milliGRAM(s)/deciliter  HYDROmorphone  Injectable 0.5 milliGRAM(s) IV Push every 4 hours PRN breakthrough pain  ondansetron Injectable 4 milliGRAM(s) IV Push every 8 hours PRN Nausea and/or Vomiting  sodium chloride 0.9% lock flush 10 milliLiter(s) IV Push every 1 hour PRN Pre/post blood products, medications, blood draw, and to maintain line patency      I&O's Detail    08 Jul 2023 07:01  -  09 Jul 2023 07:00  --------------------------------------------------------  IN:    Dexmedetomidine: 757.2 mL    Enteral Tube Flush: 380 mL    IV PiggyBack: 100 mL    Vital1.0: 1680 mL  Total IN: 2917.2 mL    OUT:    Indwelling Catheter - Urethral (mL): 325 mL    Voided (mL): 520 mL  Total OUT: 845 mL    Total NET: 2072.2 mL      09 Jul 2023 07:01  -  09 Jul 2023 08:10  --------------------------------------------------------  IN:    Dexmedetomidine: 34 mL    Vital1.0: 70 mL  Total IN: 104 mL    OUT:  Total OUT: 0 mL    Total NET: 104 mL          Vital Signs Last 24 Hrs  T(C): 37.7 (09 Jul 2023 06:09), Max: 37.8 (08 Jul 2023 17:13)  T(F): 99.9 (09 Jul 2023 06:09), Max: 100 (08 Jul 2023 17:13)  HR: 72 (09 Jul 2023 08:00) (65 - 86)  BP: 155/77 (09 Jul 2023 08:00) (123/66 - 165/91)  BP(mean): 110 (09 Jul 2023 08:00) (89 - 121)  RR: 15 (09 Jul 2023 08:00) (14 - 24)  SpO2: 100% (09 Jul 2023 08:00) (100% - 100%)    Parameters below as of 09 Jul 2023 08:00  Patient On (Oxygen Delivery Method): ventilator, cpap        General: NAD, resting comfortably in bed  C/V: NSR  Pulm: Nonlabored breathing, no respiratory distress  Abd: soft, NT/ND  Extrem: WWP, no edema, SCDs in place    LABS:                        7.4    13.76 )-----------( 474      ( 08 Jul 2023 05:16 )             23.2     07-08    141  |  107  |  20  ----------------------------<  124<H>  4.2   |  25  |  0.50    Ca    8.3<L>      08 Jul 2023 05:16  Phos  4.7     07-08  Mg     1.8     07-08        Urinalysis Basic - ( 08 Jul 2023 05:16 )    Color: x / Appearance: x / SG: x / pH: x  Gluc: 124 mg/dL / Ketone: x  / Bili: x / Urobili: x   Blood: x / Protein: x / Nitrite: x   Leuk Esterase: x / RBC: x / WBC x   Sq Epi: x / Non Sq Epi: x / Bacteria: x        RADIOLOGY & ADDITIONAL STUDIES:     ON: Passed TOV.    SUBJECTIVE: Patient seen and examined bedside by Surgical resident.  Secretions improved tolerating CPAP.     aspirin  chewable 81 milliGRAM(s) Oral daily  heparin   Injectable 5000 Unit(s) SubCutaneous every 8 hours  meropenem  IVPB 1000 milliGRAM(s) IV Intermittent every 8 hours    MEDICATIONS  (PRN):  dextrose Oral Gel 15 Gram(s) Oral once PRN Blood Glucose LESS THAN 70 milliGRAM(s)/deciliter  HYDROmorphone  Injectable 0.5 milliGRAM(s) IV Push every 4 hours PRN breakthrough pain  ondansetron Injectable 4 milliGRAM(s) IV Push every 8 hours PRN Nausea and/or Vomiting  sodium chloride 0.9% lock flush 10 milliLiter(s) IV Push every 1 hour PRN Pre/post blood products, medications, blood draw, and to maintain line patency      I&O's Detail    08 Jul 2023 07:01  -  09 Jul 2023 07:00  --------------------------------------------------------  IN:    Dexmedetomidine: 757.2 mL    Enteral Tube Flush: 380 mL    IV PiggyBack: 100 mL    Vital1.0: 1680 mL  Total IN: 2917.2 mL    OUT:    Indwelling Catheter - Urethral (mL): 325 mL    Voided (mL): 520 mL  Total OUT: 845 mL    Total NET: 2072.2 mL      09 Jul 2023 07:01  -  09 Jul 2023 08:10  --------------------------------------------------------  IN:    Dexmedetomidine: 34 mL    Vital1.0: 70 mL  Total IN: 104 mL    OUT:  Total OUT: 0 mL    Total NET: 104 mL          Vital Signs Last 24 Hrs  T(C): 37.7 (09 Jul 2023 06:09), Max: 37.8 (08 Jul 2023 17:13)  T(F): 99.9 (09 Jul 2023 06:09), Max: 100 (08 Jul 2023 17:13)  HR: 72 (09 Jul 2023 08:00) (65 - 86)  BP: 155/77 (09 Jul 2023 08:00) (123/66 - 165/91)  BP(mean): 110 (09 Jul 2023 08:00) (89 - 121)  RR: 15 (09 Jul 2023 08:00) (14 - 24)  SpO2: 100% (09 Jul 2023 08:00) (100% - 100%)    Parameters below as of 09 Jul 2023 08:00  Patient On (Oxygen Delivery Method): ventilator, cpap        General: NAD, resting comfortably in bed  C/V: NSR  Pulm: Nonlabored breathing, no respiratory distress.   Abd: soft, NT/ND  Extrem: WWP, no edema, SCDs in place    LABS:                        7.4    13.76 )-----------( 474      ( 08 Jul 2023 05:16 )             23.2     07-08    141  |  107  |  20  ----------------------------<  124<H>  4.2   |  25  |  0.50    Ca    8.3<L>      08 Jul 2023 05:16  Phos  4.7     07-08  Mg     1.8     07-08        Urinalysis Basic - ( 08 Jul 2023 05:16 )    Color: x / Appearance: x / SG: x / pH: x  Gluc: 124 mg/dL / Ketone: x  / Bili: x / Urobili: x   Blood: x / Protein: x / Nitrite: x   Leuk Esterase: x / RBC: x / WBC x   Sq Epi: x / Non Sq Epi: x / Bacteria: x        RADIOLOGY & ADDITIONAL STUDIES:

## 2023-07-09 NOTE — PROGRESS NOTE ADULT - ASSESSMENT
53M w PMHx of HTN, type A aortic dissection s/p Dacron grafts, AV resuspension in 2013, CAD s/p CABG x 1 SVG to RCA (5/2013 with Dr. Medina), seizure disorder, recent admission 3/20-4/14 for replacement of transverse aortic arch, second stage TEVAR and aorto-axillary bypass, AV replacement (bio 23mm), and CABG x 1 (SVG-RCA). Pt found to have endo leak and taken to OR for TEVAR revision on 5/5. Post op course c/b Klebsiella bacteremia (5/15), resp failure requiring intubation on 5/18, Mucus plugging s/p Bronch 5/19 and PEA arrest. Post operatively pt also found to have hemorrhagic pancreatitis with venu-pancreatic abscess possibly 2* to Depakote therefore s/p IR aspiration of peripancreatic fluid collection and paracentesis on 5/22, IR venu-pancreatic abscess drainage and placement of drainage catheter on 5/24. Pt then transferred from CTICU to SICU for septic shock, and further mgmt of hemorrhagic pancreatitis on 5/25. s/p IR placement of 2 new drainage catheters and catheter exchange on 5/26. LUQ drainage 5/30. OR 5/31 for exlap washout & replacement of 3 new JPs and abthera vac with open abdomen. RTOR 6/1, no bleeding, evac of old blood, placement of feeding J-tube, failed extubation due to PNA, completed ABX course and s/p trach 6/5 and decannulation 6/28. stepped down, but returned to SICU (7/2) for respiratory distress. Emergently intubated (7/3) for aspiration pneumonia, sputum cx grew pseudomonas.     Plan   - wean oxygen requirement to extubate at ICU discretion, currently tolerating CPAP well   - Continue Meropenem until WBC downtrending    - Rest of care as per SICU team   - Team 1 will follow

## 2023-07-10 LAB
ANION GAP SERPL CALC-SCNC: 6 MMOL/L — SIGNIFICANT CHANGE UP (ref 5–17)
ANION GAP SERPL CALC-SCNC: 9 MMOL/L — SIGNIFICANT CHANGE UP (ref 5–17)
BLD GP AB SCN SERPL QL: NEGATIVE — SIGNIFICANT CHANGE UP
BUN SERPL-MCNC: 19 MG/DL — SIGNIFICANT CHANGE UP (ref 7–23)
BUN SERPL-MCNC: 20 MG/DL — SIGNIFICANT CHANGE UP (ref 7–23)
CALCIUM SERPL-MCNC: 8.1 MG/DL — LOW (ref 8.4–10.5)
CALCIUM SERPL-MCNC: 8.1 MG/DL — LOW (ref 8.4–10.5)
CHLORIDE SERPL-SCNC: 108 MMOL/L — SIGNIFICANT CHANGE UP (ref 96–108)
CHLORIDE SERPL-SCNC: 109 MMOL/L — HIGH (ref 96–108)
CO2 SERPL-SCNC: 24 MMOL/L — SIGNIFICANT CHANGE UP (ref 22–31)
CO2 SERPL-SCNC: 26 MMOL/L — SIGNIFICANT CHANGE UP (ref 22–31)
CREAT SERPL-MCNC: 0.46 MG/DL — LOW (ref 0.5–1.3)
CREAT SERPL-MCNC: 0.47 MG/DL — LOW (ref 0.5–1.3)
EGFR: 123 ML/MIN/1.73M2 — SIGNIFICANT CHANGE UP
EGFR: 124 ML/MIN/1.73M2 — SIGNIFICANT CHANGE UP
GLUCOSE BLDC GLUCOMTR-MCNC: 88 MG/DL — SIGNIFICANT CHANGE UP (ref 70–99)
GLUCOSE BLDC GLUCOMTR-MCNC: 95 MG/DL — SIGNIFICANT CHANGE UP (ref 70–99)
GLUCOSE BLDC GLUCOMTR-MCNC: 98 MG/DL — SIGNIFICANT CHANGE UP (ref 70–99)
GLUCOSE SERPL-MCNC: 92 MG/DL — SIGNIFICANT CHANGE UP (ref 70–99)
GLUCOSE SERPL-MCNC: 97 MG/DL — SIGNIFICANT CHANGE UP (ref 70–99)
HCT VFR BLD CALC: 21.1 % — LOW (ref 39–50)
HCT VFR BLD CALC: 22 % — LOW (ref 39–50)
HCT VFR BLD CALC: 23 % — LOW (ref 39–50)
HCT VFR BLD CALC: 24.7 % — LOW (ref 39–50)
HCT VFR BLD CALC: 26.5 % — LOW (ref 39–50)
HGB BLD-MCNC: 6.7 G/DL — CRITICAL LOW (ref 13–17)
HGB BLD-MCNC: 6.9 G/DL — CRITICAL LOW (ref 13–17)
HGB BLD-MCNC: 7.3 G/DL — LOW (ref 13–17)
HGB BLD-MCNC: 8 G/DL — LOW (ref 13–17)
HGB BLD-MCNC: 8.6 G/DL — LOW (ref 13–17)
MAGNESIUM SERPL-MCNC: 2 MG/DL — SIGNIFICANT CHANGE UP (ref 1.6–2.6)
MAGNESIUM SERPL-MCNC: 2 MG/DL — SIGNIFICANT CHANGE UP (ref 1.6–2.6)
MCHC RBC-ENTMCNC: 29.5 PG — SIGNIFICANT CHANGE UP (ref 27–34)
MCHC RBC-ENTMCNC: 29.6 PG — SIGNIFICANT CHANGE UP (ref 27–34)
MCHC RBC-ENTMCNC: 29.7 PG — SIGNIFICANT CHANGE UP (ref 27–34)
MCHC RBC-ENTMCNC: 29.8 PG — SIGNIFICANT CHANGE UP (ref 27–34)
MCHC RBC-ENTMCNC: 29.9 PG — SIGNIFICANT CHANGE UP (ref 27–34)
MCHC RBC-ENTMCNC: 31.4 GM/DL — LOW (ref 32–36)
MCHC RBC-ENTMCNC: 31.7 GM/DL — LOW (ref 32–36)
MCHC RBC-ENTMCNC: 31.8 GM/DL — LOW (ref 32–36)
MCHC RBC-ENTMCNC: 32.4 GM/DL — SIGNIFICANT CHANGE UP (ref 32–36)
MCHC RBC-ENTMCNC: 32.5 GM/DL — SIGNIFICANT CHANGE UP (ref 32–36)
MCV RBC AUTO: 91.5 FL — SIGNIFICANT CHANGE UP (ref 80–100)
MCV RBC AUTO: 91.7 FL — SIGNIFICANT CHANGE UP (ref 80–100)
MCV RBC AUTO: 93 FL — SIGNIFICANT CHANGE UP (ref 80–100)
MCV RBC AUTO: 94.3 FL — SIGNIFICANT CHANGE UP (ref 80–100)
MCV RBC AUTO: 94.8 FL — SIGNIFICANT CHANGE UP (ref 80–100)
NRBC # BLD: 0 /100 WBCS — SIGNIFICANT CHANGE UP (ref 0–0)
PHOSPHATE SERPL-MCNC: 3.2 MG/DL — SIGNIFICANT CHANGE UP (ref 2.5–4.5)
PHOSPHATE SERPL-MCNC: 3.5 MG/DL — SIGNIFICANT CHANGE UP (ref 2.5–4.5)
PLATELET # BLD AUTO: 457 K/UL — HIGH (ref 150–400)
PLATELET # BLD AUTO: 470 K/UL — HIGH (ref 150–400)
PLATELET # BLD AUTO: 477 K/UL — HIGH (ref 150–400)
PLATELET # BLD AUTO: 494 K/UL — HIGH (ref 150–400)
PLATELET # BLD AUTO: 521 K/UL — HIGH (ref 150–400)
POTASSIUM SERPL-MCNC: 4.4 MMOL/L — SIGNIFICANT CHANGE UP (ref 3.5–5.3)
POTASSIUM SERPL-MCNC: 4.5 MMOL/L — SIGNIFICANT CHANGE UP (ref 3.5–5.3)
POTASSIUM SERPL-SCNC: 4.4 MMOL/L — SIGNIFICANT CHANGE UP (ref 3.5–5.3)
POTASSIUM SERPL-SCNC: 4.5 MMOL/L — SIGNIFICANT CHANGE UP (ref 3.5–5.3)
RBC # BLD: 2.27 M/UL — LOW (ref 4.2–5.8)
RBC # BLD: 2.32 M/UL — LOW (ref 4.2–5.8)
RBC # BLD: 2.44 M/UL — LOW (ref 4.2–5.8)
RBC # BLD: 2.7 M/UL — LOW (ref 4.2–5.8)
RBC # BLD: 2.89 M/UL — LOW (ref 4.2–5.8)
RBC # FLD: 17.1 % — HIGH (ref 10.3–14.5)
RBC # FLD: 17.2 % — HIGH (ref 10.3–14.5)
RBC # FLD: 17.2 % — HIGH (ref 10.3–14.5)
RBC # FLD: 18.2 % — HIGH (ref 10.3–14.5)
RBC # FLD: 18.9 % — HIGH (ref 10.3–14.5)
RH IG SCN BLD-IMP: POSITIVE — SIGNIFICANT CHANGE UP
SODIUM SERPL-SCNC: 141 MMOL/L — SIGNIFICANT CHANGE UP (ref 135–145)
SODIUM SERPL-SCNC: 141 MMOL/L — SIGNIFICANT CHANGE UP (ref 135–145)
WBC # BLD: 12.42 K/UL — HIGH (ref 3.8–10.5)
WBC # BLD: 12.91 K/UL — HIGH (ref 3.8–10.5)
WBC # BLD: 13.3 K/UL — HIGH (ref 3.8–10.5)
WBC # BLD: 13.58 K/UL — HIGH (ref 3.8–10.5)
WBC # BLD: 13.74 K/UL — HIGH (ref 3.8–10.5)
WBC # FLD AUTO: 12.42 K/UL — HIGH (ref 3.8–10.5)
WBC # FLD AUTO: 12.91 K/UL — HIGH (ref 3.8–10.5)
WBC # FLD AUTO: 13.3 K/UL — HIGH (ref 3.8–10.5)
WBC # FLD AUTO: 13.58 K/UL — HIGH (ref 3.8–10.5)
WBC # FLD AUTO: 13.74 K/UL — HIGH (ref 3.8–10.5)

## 2023-07-10 PROCEDURE — 71045 X-RAY EXAM CHEST 1 VIEW: CPT | Mod: 26

## 2023-07-10 PROCEDURE — 99233 SBSQ HOSP IP/OBS HIGH 50: CPT | Mod: GC

## 2023-07-10 RX ORDER — METOPROLOL TARTRATE 50 MG
12.5 TABLET ORAL EVERY 12 HOURS
Refills: 0 | Status: DISCONTINUED | OUTPATIENT
Start: 2023-07-10 | End: 2023-07-11

## 2023-07-10 RX ORDER — LANOLIN ALCOHOL/MO/W.PET/CERES
5 CREAM (GRAM) TOPICAL AT BEDTIME
Refills: 0 | Status: DISCONTINUED | OUTPATIENT
Start: 2023-07-10 | End: 2023-07-17

## 2023-07-10 RX ORDER — METOPROLOL TARTRATE 50 MG
12.5 TABLET ORAL EVERY 12 HOURS
Refills: 0 | Status: DISCONTINUED | OUTPATIENT
Start: 2023-07-10 | End: 2023-07-10

## 2023-07-10 RX ORDER — BENZOCAINE AND MENTHOL 5; 1 G/100ML; G/100ML
1 LIQUID ORAL ONCE
Refills: 0 | Status: COMPLETED | OUTPATIENT
Start: 2023-07-10 | End: 2023-07-10

## 2023-07-10 RX ORDER — LANOLIN ALCOHOL/MO/W.PET/CERES
5 CREAM (GRAM) TOPICAL AT BEDTIME
Refills: 0 | Status: DISCONTINUED | OUTPATIENT
Start: 2023-07-10 | End: 2023-07-10

## 2023-07-10 RX ORDER — NYSTATIN CREAM 100000 [USP'U]/G
1 CREAM TOPICAL EVERY 8 HOURS
Refills: 0 | Status: DISCONTINUED | OUTPATIENT
Start: 2023-07-10 | End: 2023-07-19

## 2023-07-10 RX ORDER — SODIUM CHLORIDE 9 MG/ML
250 INJECTION, SOLUTION INTRAVENOUS ONCE
Refills: 0 | Status: COMPLETED | OUTPATIENT
Start: 2023-07-10 | End: 2023-07-10

## 2023-07-10 RX ADMIN — Medication 3 MILLILITER(S): at 11:20

## 2023-07-10 RX ADMIN — OXYCODONE HYDROCHLORIDE 5 MILLIGRAM(S): 5 TABLET ORAL at 19:06

## 2023-07-10 RX ADMIN — SCOPALAMINE 1 PATCH: 1 PATCH, EXTENDED RELEASE TRANSDERMAL at 19:04

## 2023-07-10 RX ADMIN — Medication 1 DROP(S): at 07:01

## 2023-07-10 RX ADMIN — HEPARIN SODIUM 5000 UNIT(S): 5000 INJECTION INTRAVENOUS; SUBCUTANEOUS at 22:16

## 2023-07-10 RX ADMIN — SCOPALAMINE 1 PATCH: 1 PATCH, EXTENDED RELEASE TRANSDERMAL at 13:03

## 2023-07-10 RX ADMIN — ROBINUL 0.1 MILLIGRAM(S): 0.2 INJECTION INTRAMUSCULAR; INTRAVENOUS at 17:53

## 2023-07-10 RX ADMIN — Medication 2 MILLIGRAM(S): at 11:43

## 2023-07-10 RX ADMIN — MEROPENEM 100 MILLIGRAM(S): 1 INJECTION INTRAVENOUS at 10:18

## 2023-07-10 RX ADMIN — Medication 975 MILLIGRAM(S): at 23:59

## 2023-07-10 RX ADMIN — NYSTATIN CREAM 1 APPLICATION(S): 100000 CREAM TOPICAL at 21:58

## 2023-07-10 RX ADMIN — Medication 2 MILLIGRAM(S): at 07:00

## 2023-07-10 RX ADMIN — OXYCODONE HYDROCHLORIDE 5 MILLIGRAM(S): 5 TABLET ORAL at 04:15

## 2023-07-10 RX ADMIN — Medication 3 MILLILITER(S): at 05:13

## 2023-07-10 RX ADMIN — Medication 12.5 MILLIGRAM(S): at 17:53

## 2023-07-10 RX ADMIN — Medication 975 MILLIGRAM(S): at 07:38

## 2023-07-10 RX ADMIN — Medication 1 DROP(S): at 18:16

## 2023-07-10 RX ADMIN — PANTOPRAZOLE SODIUM 40 MILLIGRAM(S): 20 TABLET, DELAYED RELEASE ORAL at 11:41

## 2023-07-10 RX ADMIN — Medication 3 MILLILITER(S): at 21:17

## 2023-07-10 RX ADMIN — SCOPALAMINE 1 PATCH: 1 PATCH, EXTENDED RELEASE TRANSDERMAL at 07:40

## 2023-07-10 RX ADMIN — Medication 975 MILLIGRAM(S): at 18:53

## 2023-07-10 RX ADMIN — OXYCODONE HYDROCHLORIDE 5 MILLIGRAM(S): 5 TABLET ORAL at 21:55

## 2023-07-10 RX ADMIN — Medication 81 MILLIGRAM(S): at 11:41

## 2023-07-10 RX ADMIN — SODIUM CHLORIDE 4 MILLILITER(S): 9 INJECTION INTRAMUSCULAR; INTRAVENOUS; SUBCUTANEOUS at 05:14

## 2023-07-10 RX ADMIN — ROBINUL 0.1 MILLIGRAM(S): 0.2 INJECTION INTRAMUSCULAR; INTRAVENOUS at 11:41

## 2023-07-10 RX ADMIN — SODIUM CHLORIDE 4 MILLILITER(S): 9 INJECTION INTRAMUSCULAR; INTRAVENOUS; SUBCUTANEOUS at 11:21

## 2023-07-10 RX ADMIN — OXYCODONE HYDROCHLORIDE 5 MILLIGRAM(S): 5 TABLET ORAL at 06:59

## 2023-07-10 RX ADMIN — SCOPALAMINE 1 PATCH: 1 PATCH, EXTENDED RELEASE TRANSDERMAL at 11:40

## 2023-07-10 RX ADMIN — ONDANSETRON 4 MILLIGRAM(S): 8 TABLET, FILM COATED ORAL at 18:32

## 2023-07-10 RX ADMIN — ROBINUL 0.1 MILLIGRAM(S): 0.2 INJECTION INTRAMUSCULAR; INTRAVENOUS at 23:31

## 2023-07-10 RX ADMIN — Medication 975 MILLIGRAM(S): at 06:50

## 2023-07-10 RX ADMIN — HEPARIN SODIUM 5000 UNIT(S): 5000 INJECTION INTRAVENOUS; SUBCUTANEOUS at 13:26

## 2023-07-10 RX ADMIN — Medication 975 MILLIGRAM(S): at 00:00

## 2023-07-10 RX ADMIN — Medication 975 MILLIGRAM(S): at 17:53

## 2023-07-10 RX ADMIN — BENZOCAINE AND MENTHOL 1 LOZENGE: 5; 1 LIQUID ORAL at 17:54

## 2023-07-10 RX ADMIN — Medication 2 MILLIGRAM(S): at 23:33

## 2023-07-10 RX ADMIN — Medication 5 MILLIGRAM(S): at 22:23

## 2023-07-10 RX ADMIN — Medication 2 MILLIGRAM(S): at 17:55

## 2023-07-10 RX ADMIN — OXYCODONE HYDROCHLORIDE 5 MILLIGRAM(S): 5 TABLET ORAL at 07:38

## 2023-07-10 RX ADMIN — Medication 15 MILLILITER(S): at 11:47

## 2023-07-10 RX ADMIN — ROBINUL 0.1 MILLIGRAM(S): 0.2 INJECTION INTRAMUSCULAR; INTRAVENOUS at 06:50

## 2023-07-10 RX ADMIN — MEROPENEM 100 MILLIGRAM(S): 1 INJECTION INTRAVENOUS at 17:53

## 2023-07-10 RX ADMIN — SODIUM CHLORIDE 4 MILLILITER(S): 9 INJECTION INTRAMUSCULAR; INTRAVENOUS; SUBCUTANEOUS at 21:16

## 2023-07-10 RX ADMIN — Medication 3 MILLILITER(S): at 16:40

## 2023-07-10 RX ADMIN — SODIUM CHLORIDE 500 MILLILITER(S): 9 INJECTION, SOLUTION INTRAVENOUS at 00:59

## 2023-07-10 RX ADMIN — Medication 975 MILLIGRAM(S): at 11:40

## 2023-07-10 RX ADMIN — SODIUM CHLORIDE 4 MILLILITER(S): 9 INJECTION INTRAMUSCULAR; INTRAVENOUS; SUBCUTANEOUS at 16:40

## 2023-07-10 RX ADMIN — LACOSAMIDE 200 MILLIGRAM(S): 50 TABLET ORAL at 18:17

## 2023-07-10 RX ADMIN — OXYCODONE HYDROCHLORIDE 5 MILLIGRAM(S): 5 TABLET ORAL at 03:35

## 2023-07-10 RX ADMIN — LACOSAMIDE 200 MILLIGRAM(S): 50 TABLET ORAL at 06:59

## 2023-07-10 RX ADMIN — Medication 975 MILLIGRAM(S): at 23:30

## 2023-07-10 RX ADMIN — HEPARIN SODIUM 5000 UNIT(S): 5000 INJECTION INTRAVENOUS; SUBCUTANEOUS at 06:51

## 2023-07-10 RX ADMIN — MEROPENEM 100 MILLIGRAM(S): 1 INJECTION INTRAVENOUS at 00:18

## 2023-07-10 RX ADMIN — NYSTATIN CREAM 1 APPLICATION(S): 100000 CREAM TOPICAL at 14:15

## 2023-07-10 RX ADMIN — Medication 975 MILLIGRAM(S): at 12:40

## 2023-07-10 RX ADMIN — CHLORHEXIDINE GLUCONATE 1 APPLICATION(S): 213 SOLUTION TOPICAL at 11:43

## 2023-07-10 NOTE — PROGRESS NOTE ADULT - SUBJECTIVE AND OBJECTIVE BOX
Interval Events:  Febrile  overnight and tachycardic given 250cc bolus x1.   Patient seen and examined at bedside.      Allergies    No Known Allergies    Intolerances        Vital Signs Last 24 Hrs  T(C): 37.4 (10 Jul 2023 05:11), Max: 38 (09 Jul 2023 23:00)  T(F): 99.3 (10 Jul 2023 05:11), Max: 100.4 (09 Jul 2023 23:00)  HR: 119 (10 Jul 2023 07:00) (69 - 124)  BP: 136/79 (10 Jul 2023 07:00) (115/58 - 165/79)  BP(mean): 102 (10 Jul 2023 07:00) (81 - 113)  RR: 27 (10 Jul 2023 07:00) (14 - 33)  SpO2: 100% (10 Jul 2023 07:00) (97% - 100%)    Parameters below as of 10 Jul 2023 05:00  Patient On (Oxygen Delivery Method): nasal cannula  O2 Flow (L/min): 2      07-09 @ 07:01  -  07-10 @ 07:00  --------------------------------------------------------  IN: 2354 mL / OUT: 980 mL / NET: 1374 mL      07-09 @ 07:01  -  07-10 @ 07:00  --------------------------------------------------------  IN: 2354 mL / OUT: 980 mL / NET: 1374 mL        Physical Exam:     Neurological: lightly sedated arousible easily by voice. strength 5/5 b/l  ENT: mucus membrane moist  Cardiovascular: RRR + murmur  Respiratory: CTA  Gastrointestinal: soft, NT, ND, BS+, midline incision healing, no erythema, no bleeding, no pus.   Extremities: warm, 2+ dependent edema  Vascular: no cyanosis/erythema  Skin: no rashes  MSK: no joint swelling.   Psych: No signs of anxiety or depression      LABS:      CBC Full  -  ( 10 Jul 2023 06:18 )  WBC Count : 13.30 K/uL  RBC Count : 2.32 M/uL  Hemoglobin : 6.9 g/dL  Hematocrit : 22.0 %  Platelet Count - Automated : 494 K/uL  Mean Cell Volume : 94.8 fl  Mean Cell Hemoglobin : 29.7 pg  Mean Cell Hemoglobin Concentration : 31.4 gm/dL  Auto Neutrophil # : x  Auto Lymphocyte # : x  Auto Monocyte # : x  Auto Eosinophil # : x  Auto Basophil # : x  Auto Neutrophil % : x  Auto Lymphocyte % : x  Auto Monocyte % : x  Auto Eosinophil % : x  Auto Basophil % : x    07-10    141  |  108  |  19  ----------------------------<  92  4.4   |  24  |  0.46<L>    Ca    8.1<L>      10 Jul 2023 05:49  Phos  3.2     07-10  Mg     2.0     07-10            Urinalysis Basic - ( 10 Jul 2023 05:49 )    Color: x / Appearance: x / SG: x / pH: x  Gluc: 92 mg/dL / Ketone: x  / Bili: x / Urobili: x   Blood: x / Protein: x / Nitrite: x   Leuk Esterase: x / RBC: x / WBC x   Sq Epi: x / Non Sq Epi: x / Bacteria: x              RADIOLOGY & ADDITIONAL STUDIES (The following images were personally reviewed):          A/p: 53M w PMHx of HTN, type A aortic dissection s/p Dacron grafts, AV resuspension in 2013, CAD s/p CABG x 1 SVG to RCA (5/2013 with Dr. Medina), seizure disorder, recent admission 3/20-4/14 for replacement of transverse aortic arch, second stage TEVAR and aorto-axillary bypass, AV replacement (bio 23mm), and CABG x 1 (SVG-RCA). Pt found to have endo leak and taken to OR for TEVAR revision on 5/5. Post op course c/b Klebsiella bacteremia (5/15), resp failure requiring intubation on 5/18, Mucus plugging s/p Bronch 5/19 and PEA arrest. Post operatively pt also found to have hemorrhagic pancreatitis with venu-pancreatic abscess possibly 2* to Depakote therefore s/p IR aspiration of peripancreatic fluid collection and paracentesis on 5/22, IR venu-pancreatic abscess drainage and placement of drainage catheter on 5/24. Pt then transferred from CTICU to SICU for septic shock, and further mgmt of hemorrhagic pancreatitis on 5/25. s/p IR placement of 2 new drainage catheters and catheter exchange on 5/26. LUQ drainage 5/30. OR 5/31 for exlap washout & replacement of 3 new JPs and abthera vac with open abdomen. RTOR 6/1, no bleeding, evac of old blood, placement of feeding J-tube, failed extubation due to PNA, completed ABX course and s/p trach 6/5 and decannulation 6/28. stepped down, but returned to SICU (7/2) for respiratory distress. Emergently intubated (7/3) for aspiration pneumonia, sputum cx grew pseudomonas.     NEURO: Pain control with OxyIR q12 standing and Oxy 5 q6 prn breakthrough,  Hx seizures: cont vimpat. Avoid depakote due to drug-related pancreatitis risk.   HEENT: Artificial tears, Torticollis improved  CV: s/p PEA arrest on 5/24 night. TTE (6/14) LVEF 75%, LVH, biatrial enlargement and elevated PA pressure (elevated from previous), mild to mod MR/TR . Cont ASA 81mg.  restart metoprolol today  total body fluid overloaded, cont diurese with lasix for goal net negative I&O.  Hgb 6.7 this am   PULM: Aspiration PNA: S/p Reintubation on 7/3 and extubation on 7/9 - Cont Duonebs, 3% saline nebs, tolerating trial of scopolamine patch for secretions, glycopyrrolate q6h for secretions. Hx of recent ARDS/PNA resolved and trach decannulated 6/28.   GI/FEN: TF Vital 1.0 at 70cc via feeding J-tube, diarrhea on imodium, MTV, Protonix. Hemorrhagic pancreatitis: s/p multiple drain placements and paracentisis by IR team. Cdiff negative (6/19).  : acute renal failure- been off CVVHD since mid June with renal recovery. Voids.  HEME: Acute blood loss anemia: hg stable.   ENDO: mISS  ID: Sepsis without shock, Pseudomonas in Sputum: on Meropenem( 7/7-) as per Surgical team request.     - PRIOR ABX: Ertapenem (6/13-6/16, 6/18-6/23), meropenem (5/21-6/5, 6/9-6/13, 7/2-7/5), vanco (6/9, 6/18-6/22, 7/2-7/5), caspofungin (5/23-5/30, 6/9-6/12, 6/18-6/21), Ceftriaxone( 6/5- 6/15), Ampicillin (5/21- 5/27). Zosyn: ( 7/- 7/7)   - Prior Cultures: Kleb bacteremia from 5/15 & 5/17. bronch cx kleb, enterobacter (zosyn, erta resistant), E faecalis, strep angionosus from 5/19, pancreatic abscess cx pan-sensitive kleb 5/22.   PPX: SCDs, SQH  LINES: PIVs // L rad kalpana (5/31-6/15), 5Fr PIV in LUE (6/13-15), Lsubclav HD Cath (5/31-6/12), RIJ TLC (5/22-5/27), RIJ HD cath (5/22-31), R Ax kalpana(5/12-5/31), LIJ TLC (5/23-6/1), RIJ TLC (6/1-13)   WOUNDS/DRAINS: CODIE removed. midline incision healed.   PT: PT/OT consult requested for early mobilization.   Dispo: SICU   Interval Events:  Febrile  overnight and tachycardic given 250cc bolus x1.   Patient seen and examined at bedside.      Allergies    No Known Allergies    Intolerances        Vital Signs Last 24 Hrs  T(C): 37.4 (10 Jul 2023 05:11), Max: 38 (09 Jul 2023 23:00)  T(F): 99.3 (10 Jul 2023 05:11), Max: 100.4 (09 Jul 2023 23:00)  HR: 119 (10 Jul 2023 07:00) (69 - 124)  BP: 136/79 (10 Jul 2023 07:00) (115/58 - 165/79)  BP(mean): 102 (10 Jul 2023 07:00) (81 - 113)  RR: 27 (10 Jul 2023 07:00) (14 - 33)  SpO2: 100% (10 Jul 2023 07:00) (97% - 100%)    Parameters below as of 10 Jul 2023 05:00  Patient On (Oxygen Delivery Method): nasal cannula  O2 Flow (L/min): 2      07-09 @ 07:01  -  07-10 @ 07:00  --------------------------------------------------------  IN: 2354 mL / OUT: 980 mL / NET: 1374 mL      07-09 @ 07:01  -  07-10 @ 07:00  --------------------------------------------------------  IN: 2354 mL / OUT: 980 mL / NET: 1374 mL        Physical Exam:     Neurological: lightly sedated rousable easily by voice. strength 5/5 b/l  ENT: mucus membrane moist  Cardiovascular: RRR + murmur  Respiratory: CTA  Gastrointestinal: soft, NT, ND, BS+, midline incision healing, no erythema, no bleeding, no pus.   Extremities: warm, 2+ dependent edema  Vascular: no cyanosis/erythema  Skin: no rashes  MSK: no joint swelling.   Psych: No signs of anxiety or depression      LABS:      CBC Full  -  ( 10 Jul 2023 06:18 )  WBC Count : 13.30 K/uL  RBC Count : 2.32 M/uL  Hemoglobin : 6.9 g/dL  Hematocrit : 22.0 %  Platelet Count - Automated : 494 K/uL  Mean Cell Volume : 94.8 fl  Mean Cell Hemoglobin : 29.7 pg  Mean Cell Hemoglobin Concentration : 31.4 gm/dL  Auto Neutrophil # : x  Auto Lymphocyte # : x  Auto Monocyte # : x  Auto Eosinophil # : x  Auto Basophil # : x  Auto Neutrophil % : x  Auto Lymphocyte % : x  Auto Monocyte % : x  Auto Eosinophil % : x  Auto Basophil % : x    07-10    141  |  108  |  19  ----------------------------<  92  4.4   |  24  |  0.46<L>    Ca    8.1<L>      10 Jul 2023 05:49  Phos  3.2     07-10  Mg     2.0     07-10            Urinalysis Basic - ( 10 Jul 2023 05:49 )    Color: x / Appearance: x / SG: x / pH: x  Gluc: 92 mg/dL / Ketone: x  / Bili: x / Urobili: x   Blood: x / Protein: x / Nitrite: x   Leuk Esterase: x / RBC: x / WBC x   Sq Epi: x / Non Sq Epi: x / Bacteria: x              RADIOLOGY & ADDITIONAL STUDIES (The following images were personally reviewed):          A/p: 53M w PMHx of HTN, type A aortic dissection s/p Dacron grafts, AV resuspension in 2013, CAD s/p CABG x 1 SVG to RCA (5/2013 with Dr. Medina), seizure disorder, recent admission 3/20-4/14 for replacement of transverse aortic arch, second stage TEVAR and aorto-axillary bypass, AV replacement (bio 23mm), and CABG x 1 (SVG-RCA). Pt found to have endo leak and taken to OR for TEVAR revision on 5/5. Post op course c/b Klebsiella bacteremia (5/15), resp failure requiring intubation on 5/18, Mucus plugging s/p Bronch 5/19 and PEA arrest. Post operatively pt also found to have hemorrhagic pancreatitis with venu-pancreatic abscess possibly 2* to Depakote therefore s/p IR aspiration of peripancreatic fluid collection and paracentesis on 5/22, IR venu-pancreatic abscess drainage and placement of drainage catheter on 5/24. Pt then transferred from CTICU to SICU for septic shock, and further mgmt of hemorrhagic pancreatitis on 5/25. s/p IR placement of 2 new drainage catheters and catheter exchange on 5/26. LUQ drainage 5/30. OR 5/31 for exlap washout & replacement of 3 new JPs and abthera vac with open abdomen. RTOR 6/1, no bleeding, evac of old blood, placement of feeding J-tube, failed extubation due to PNA, completed ABX course and s/p trach 6/5 and decannulation 6/28. stepped down, but returned to SICU (7/2) for respiratory distress. Emergently intubated (7/3) for aspiration pneumonia, sputum cx grew pseudomonas.     NEURO: Pain control with OxyIR q12 standing and Oxy 5 q6 prn breakthrough,  Hx seizures: cont vimpat. Avoid depakote due to drug-related pancreatitis risk.   HEENT: Artificial tears, Torticollis improved  CV: s/p PEA arrest on 5/24 night. TTE (6/14) LVEF 75%, LVH, biatrial enlargement and elevated PA pressure (elevated from previous), mild to mod MR/TR . Cont ASA 81mg.  restart metoprolol today  total body fluid overloaded, cont diurese with lasix for goal net negative I&O.  Hgb 6.7 this am given 1UPRBC. Will follow repeat cbc post transfusion  PULM: Aspiration PNA: S/p Reintubation on 7/3 and extubation on 7/9 - Cont Duonebs, 3% saline nebs, tolerating trial of scopolamine patch for secretions, glycopyrrolate q6h for secretions. Hx of recent ARDS/PNA resolved and trach decannulated 6/28.   GI/FEN: TF Vital 1.0 at 70cc via feeding J-tube, diarrhea on imodium, MTV, Protonix. Hemorrhagic pancreatitis: s/p multiple drain placements and paracentisis by IR team. Cdiff negative (6/19).  : acute renal failure- been off CVVHD since mid June with renal recovery. Voids.  HEME: Acute blood loss anemia: hg stable.   ENDO: mISS  ID: Sepsis without shock, Pseudomonas in Sputum: on Meropenem( 7/7-) as per Surgical team request.     - PRIOR ABX: Ertapenem (6/13-6/16, 6/18-6/23), meropenem (5/21-6/5, 6/9-6/13, 7/2-7/5), vanco (6/9, 6/18-6/22, 7/2-7/5), caspofungin (5/23-5/30, 6/9-6/12, 6/18-6/21), Ceftriaxone( 6/5- 6/15), Ampicillin (5/21- 5/27). Zosyn: ( 7/- 7/7)   - Prior Cultures: Kleb bacteremia from 5/15 & 5/17. bronch cx kleb, enterobacter (zosyn, erta resistant), E faecalis, strep angionosus from 5/19, pancreatic abscess cx pan-sensitive kleb 5/22.   PPX: SCDs, SQH  LINES: PIVs // L rad kalpana (5/31-6/15), 5Fr PIV in LUE (6/13-15), Lsubclav HD Cath (5/31-6/12), RIJ TLC (5/22-5/27), RIJ HD cath (5/22-31), R Ax kalpana(5/12-5/31), LIJ TLC (5/23-6/1), RIJ TLC (6/1-13)   WOUNDS/DRAINS: CODIE removed. midline incision healed.   PT: PT/OT consult requested for early mobilization.   Dispo: SICU

## 2023-07-10 NOTE — PROGRESS NOTE ADULT - SUBJECTIVE AND OBJECTIVE BOX
SUBJECTIVE: Patient seen at bed side during morning rounds. Extubated on 7/9, tolerating room air. Spiked fever and was tachycardic over night, received 250 cc bolus.     MEDICATIONS  (STANDING):  acetaminophen     Tablet .. 975 milliGRAM(s) Oral every 6 hours  albuterol/ipratropium for Nebulization 3 milliLiter(s) Nebulizer every 6 hours  artificial  tears Solution 1 Drop(s) Both EYES two times a day  aspirin  chewable 81 milliGRAM(s) Oral daily  chlorhexidine 2% Cloths 1 Application(s) Topical daily  dextrose 5%. 1000 milliLiter(s) (100 mL/Hr) IV Continuous <Continuous>  dextrose 50% Injectable 25 Gram(s) IV Push once  glucagon  Injectable 1 milliGRAM(s) IntraMuscular once  glycopyrrolate Injectable 0.1 milliGRAM(s) IV Push every 6 hours  heparin   Injectable 5000 Unit(s) SubCutaneous every 8 hours  insulin lispro (ADMELOG) corrective regimen sliding scale   SubCutaneous every 6 hours  lacosamide Solution 200 milliGRAM(s) Oral two times a day  loperamide Liquid 2 milliGRAM(s) Enteral Tube every 6 hours  meropenem  IVPB 1000 milliGRAM(s) IV Intermittent every 8 hours  metoprolol tartrate 12.5 milliGRAM(s) Enteral Tube every 12 hours  multivitamin/minerals/iron Oral Solution (CENTRUM) 15 milliLiter(s) Oral daily  nystatin Powder 1 Application(s) Topical every 8 hours  oxyCODONE    Solution 5 milliGRAM(s) Oral every 12 hours  pantoprazole  Injectable 40 milliGRAM(s) IV Push daily  scopolamine 1 mG/72 Hr(s) Patch 1 Patch Transdermal every 72 hours  sodium chloride 3%  Inhalation 4 milliLiter(s) Inhalation every 6 hours    MEDICATIONS  (PRN):  dextrose Oral Gel 15 Gram(s) Oral once PRN Blood Glucose LESS THAN 70 milliGRAM(s)/deciliter  melatonin Liquid 5 milliGRAM(s) Oral at bedtime PRN Sleep  ondansetron Injectable 4 milliGRAM(s) IV Push every 8 hours PRN Nausea and/or Vomiting  oxyCODONE    Solution 5 milliGRAM(s) Oral every 6 hours PRN breakthrough pain  sodium chloride 0.9% lock flush 10 milliLiter(s) IV Push every 1 hour PRN Pre/post blood products, medications, blood draw, and to maintain line patency      Drips:     ICU Vital Signs Last 24 Hrs  T(C): 37.3 (10 Jul 2023 10:00), Max: 38 (09 Jul 2023 23:00)  T(F): 99.1 (10 Jul 2023 10:00), Max: 100.4 (09 Jul 2023 23:00)  HR: 112 (10 Jul 2023 11:00) (76 - 124)  BP: 141/88 (10 Jul 2023 11:00) (115/58 - 154/74)  BP(mean): 110 (10 Jul 2023 11:00) (81 - 113)  ABP: --  ABP(mean): --  RR: 20 (10 Jul 2023 11:00) (14 - 33)  SpO2: 99% (10 Jul 2023 11:00) (92% - 100%)    O2 Parameters below as of 10 Jul 2023 10:00  Patient On (Oxygen Delivery Method): room air            Physical Exam:  Neurological: alert   ENT: mucus membrane moist  Cardiovascular: RRR + murmur  Respiratory: CTA  Gastrointestinal: soft, NT, ND, BS+, midline incision healing, no erythema, no bleeding, no pus.   Extremities: warm, 2+ dependent edema  Vascular: no cyanosis/erythema  Skin: no rashes  MSK: no joint swelling.   Psych: No signs of anxiety or depression        I&O's Summary    09 Jul 2023 07:01  -  10 Jul 2023 07:00  --------------------------------------------------------  IN: 2714 mL / OUT: 1230 mL / NET: 1484 mL    10 Jul 2023 07:01  -  10 Jul 2023 11:12  --------------------------------------------------------  IN: 70 mL / OUT: 0 mL / NET: 70 mL        LABS:                        6.9    13.30 )-----------( 494      ( 10 Jul 2023 06:18 )             22.0     07-10    141  |  108  |  19  ----------------------------<  92  4.4   |  24  |  0.46<L>    Ca    8.1<L>      10 Jul 2023 05:49  Phos  3.2     07-10  Mg     2.0     07-10        Urinalysis Basic - ( 10 Jul 2023 05:49 )    Color: x / Appearance: x / SG: x / pH: x  Gluc: 92 mg/dL / Ketone: x  / Bili: x / Urobili: x   Blood: x / Protein: x / Nitrite: x   Leuk Esterase: x / RBC: x / WBC x   Sq Epi: x / Non Sq Epi: x / Bacteria: x      CAPILLARY BLOOD GLUCOSE      POCT Blood Glucose.: 98 mg/dL (10 Jul 2023 06:59)  POCT Blood Glucose.: 100 mg/dL (09 Jul 2023 23:35)  POCT Blood Glucose.: 102 mg/dL (09 Jul 2023 17:53)  POCT Blood Glucose.: 126 mg/dL (09 Jul 2023 12:31)

## 2023-07-10 NOTE — PROGRESS NOTE ADULT - ASSESSMENT
53M w PMHx of HTN, type A aortic dissection s/p Dacron grafts, AV resuspension in 2013, CAD s/p CABG x 1 SVG to RCA (5/2013 with Dr. Medina), seizure disorder, recent admission 3/20-4/14 for replacement of transverse aortic arch, second stage TEVAR and aorto-axillary bypass, AV replacement (bio 23mm), and CABG x 1 (SVG-RCA). Pt found to have endo leak and taken to OR for TEVAR revision on 5/5. Post op course c/b Klebsiella bacteremia (5/15), resp failure requiring intubation on 5/18, Mucus plugging s/p Bronch 5/19 and PEA arrest. Post operatively pt also found to have hemorrhagic pancreatitis with venu-pancreatic abscess possibly 2* to Depakote therefore s/p IR aspiration of peripancreatic fluid collection and paracentesis on 5/22, IR venu-pancreatic abscess drainage and placement of drainage catheter on 5/24. Pt then transferred from CTICU to SICU for septic shock, and further mgmt of hemorrhagic pancreatitis on 5/25. s/p IR placement of 2 new drainage catheters and catheter exchange on 5/26. LUQ drainage 5/30. OR 5/31 for exlap washout & replacement of 3 new JPs and abthera vac with open abdomen. RTOR 6/1, no bleeding, evac of old blood, placement of feeding J-tube, failed extubation due to PNA, completed ABX course and s/p trach 6/5 and decannulation 6/28. stepped down, but returned to SICU (7/2) for respiratory distress. Emergently intubated (7/3) for aspiration pneumonia, sputum cx grew pseudomonas. ARDS improving, patient extubated on 7/9, tolerating room air, mobilizing respiratory secretions. Hemoglobin dropped     Plan   - Transfuse 1 PRBC, trend hemoglobin   - Rest of care as per SICU team   - Team 1 will follow

## 2023-07-10 NOTE — CHART NOTE - NSCHARTNOTEFT_GEN_A_CORE
Infectious Diseases Anti-infective Approval Note    Medication:  Meropenem  Dose: 1 gram  Route:  IV  Frequency: q8hrs  Duration:  1 day    Duration refers to duration of approval, not recommended duration of treatment     Dose may be adjusted as needed for alterations in renal function.    *THIS IS NOT AN INFECTIOUS DISEASES CONSULTATION*

## 2023-07-11 LAB
ANION GAP SERPL CALC-SCNC: 8 MMOL/L — SIGNIFICANT CHANGE UP (ref 5–17)
BUN SERPL-MCNC: 17 MG/DL — SIGNIFICANT CHANGE UP (ref 7–23)
CALCIUM SERPL-MCNC: 8.4 MG/DL — SIGNIFICANT CHANGE UP (ref 8.4–10.5)
CHLORIDE SERPL-SCNC: 108 MMOL/L — SIGNIFICANT CHANGE UP (ref 96–108)
CO2 SERPL-SCNC: 24 MMOL/L — SIGNIFICANT CHANGE UP (ref 22–31)
CREAT SERPL-MCNC: 0.46 MG/DL — LOW (ref 0.5–1.3)
EGFR: 124 ML/MIN/1.73M2 — SIGNIFICANT CHANGE UP
GLUCOSE BLDC GLUCOMTR-MCNC: 105 MG/DL — HIGH (ref 70–99)
GLUCOSE BLDC GLUCOMTR-MCNC: 107 MG/DL — HIGH (ref 70–99)
GLUCOSE BLDC GLUCOMTR-MCNC: 98 MG/DL — SIGNIFICANT CHANGE UP (ref 70–99)
GLUCOSE BLDC GLUCOMTR-MCNC: 98 MG/DL — SIGNIFICANT CHANGE UP (ref 70–99)
GLUCOSE SERPL-MCNC: 97 MG/DL — SIGNIFICANT CHANGE UP (ref 70–99)
HCT VFR BLD CALC: 26 % — LOW (ref 39–50)
HGB BLD-MCNC: 8.2 G/DL — LOW (ref 13–17)
MAGNESIUM SERPL-MCNC: 2 MG/DL — SIGNIFICANT CHANGE UP (ref 1.6–2.6)
MCHC RBC-ENTMCNC: 28.9 PG — SIGNIFICANT CHANGE UP (ref 27–34)
MCHC RBC-ENTMCNC: 31.5 GM/DL — LOW (ref 32–36)
MCV RBC AUTO: 91.5 FL — SIGNIFICANT CHANGE UP (ref 80–100)
NRBC # BLD: 0 /100 WBCS — SIGNIFICANT CHANGE UP (ref 0–0)
PHOSPHATE SERPL-MCNC: 3.1 MG/DL — SIGNIFICANT CHANGE UP (ref 2.5–4.5)
PLATELET # BLD AUTO: 465 K/UL — HIGH (ref 150–400)
POTASSIUM SERPL-MCNC: 4.6 MMOL/L — SIGNIFICANT CHANGE UP (ref 3.5–5.3)
POTASSIUM SERPL-SCNC: 4.6 MMOL/L — SIGNIFICANT CHANGE UP (ref 3.5–5.3)
RBC # BLD: 2.84 M/UL — LOW (ref 4.2–5.8)
RBC # FLD: 18.8 % — HIGH (ref 10.3–14.5)
SODIUM SERPL-SCNC: 140 MMOL/L — SIGNIFICANT CHANGE UP (ref 135–145)
WBC # BLD: 13.68 K/UL — HIGH (ref 3.8–10.5)
WBC # FLD AUTO: 13.68 K/UL — HIGH (ref 3.8–10.5)

## 2023-07-11 PROCEDURE — 71045 X-RAY EXAM CHEST 1 VIEW: CPT | Mod: 26

## 2023-07-11 PROCEDURE — 99233 SBSQ HOSP IP/OBS HIGH 50: CPT | Mod: GC

## 2023-07-11 RX ORDER — ONDANSETRON 8 MG/1
4 TABLET, FILM COATED ORAL ONCE
Refills: 0 | Status: COMPLETED | OUTPATIENT
Start: 2023-07-11 | End: 2023-07-11

## 2023-07-11 RX ORDER — METOPROLOL TARTRATE 50 MG
25 TABLET ORAL
Refills: 0 | Status: DISCONTINUED | OUTPATIENT
Start: 2023-07-11 | End: 2023-07-11

## 2023-07-11 RX ORDER — METOPROLOL TARTRATE 50 MG
25 TABLET ORAL
Refills: 0 | Status: DISCONTINUED | OUTPATIENT
Start: 2023-07-11 | End: 2023-07-12

## 2023-07-11 RX ORDER — CALAMINE AND ZINC OXIDE AND PHENOL 160; 10 MG/ML; MG/ML
1 LOTION TOPICAL
Refills: 0 | Status: DISCONTINUED | OUTPATIENT
Start: 2023-07-11 | End: 2023-07-19

## 2023-07-11 RX ORDER — METOPROLOL TARTRATE 50 MG
12.5 TABLET ORAL ONCE
Refills: 0 | Status: COMPLETED | OUTPATIENT
Start: 2023-07-11 | End: 2023-07-11

## 2023-07-11 RX ADMIN — NYSTATIN CREAM 1 APPLICATION(S): 100000 CREAM TOPICAL at 18:06

## 2023-07-11 RX ADMIN — Medication 975 MILLIGRAM(S): at 12:25

## 2023-07-11 RX ADMIN — ONDANSETRON 4 MILLIGRAM(S): 8 TABLET, FILM COATED ORAL at 01:52

## 2023-07-11 RX ADMIN — Medication 15 MILLILITER(S): at 12:36

## 2023-07-11 RX ADMIN — Medication 975 MILLIGRAM(S): at 05:09

## 2023-07-11 RX ADMIN — Medication 2 MILLIGRAM(S): at 17:44

## 2023-07-11 RX ADMIN — SODIUM CHLORIDE 4 MILLILITER(S): 9 INJECTION INTRAMUSCULAR; INTRAVENOUS; SUBCUTANEOUS at 09:02

## 2023-07-11 RX ADMIN — OXYCODONE HYDROCHLORIDE 5 MILLIGRAM(S): 5 TABLET ORAL at 12:35

## 2023-07-11 RX ADMIN — Medication 3 MILLILITER(S): at 22:26

## 2023-07-11 RX ADMIN — ROBINUL 0.1 MILLIGRAM(S): 0.2 INJECTION INTRAMUSCULAR; INTRAVENOUS at 05:10

## 2023-07-11 RX ADMIN — ROBINUL 0.1 MILLIGRAM(S): 0.2 INJECTION INTRAMUSCULAR; INTRAVENOUS at 17:41

## 2023-07-11 RX ADMIN — OXYCODONE HYDROCHLORIDE 5 MILLIGRAM(S): 5 TABLET ORAL at 18:55

## 2023-07-11 RX ADMIN — MEROPENEM 100 MILLIGRAM(S): 1 INJECTION INTRAVENOUS at 00:16

## 2023-07-11 RX ADMIN — Medication 1 DROP(S): at 05:10

## 2023-07-11 RX ADMIN — OXYCODONE HYDROCHLORIDE 5 MILLIGRAM(S): 5 TABLET ORAL at 05:15

## 2023-07-11 RX ADMIN — Medication 3 MILLILITER(S): at 15:11

## 2023-07-11 RX ADMIN — Medication 3 MILLILITER(S): at 09:02

## 2023-07-11 RX ADMIN — ROBINUL 0.1 MILLIGRAM(S): 0.2 INJECTION INTRAMUSCULAR; INTRAVENOUS at 12:25

## 2023-07-11 RX ADMIN — OXYCODONE HYDROCHLORIDE 5 MILLIGRAM(S): 5 TABLET ORAL at 12:40

## 2023-07-11 RX ADMIN — SODIUM CHLORIDE 4 MILLILITER(S): 9 INJECTION INTRAMUSCULAR; INTRAVENOUS; SUBCUTANEOUS at 15:11

## 2023-07-11 RX ADMIN — PANTOPRAZOLE SODIUM 40 MILLIGRAM(S): 20 TABLET, DELAYED RELEASE ORAL at 12:27

## 2023-07-11 RX ADMIN — SODIUM CHLORIDE 4 MILLILITER(S): 9 INJECTION INTRAMUSCULAR; INTRAVENOUS; SUBCUTANEOUS at 06:13

## 2023-07-11 RX ADMIN — OXYCODONE HYDROCHLORIDE 5 MILLIGRAM(S): 5 TABLET ORAL at 17:44

## 2023-07-11 RX ADMIN — OXYCODONE HYDROCHLORIDE 5 MILLIGRAM(S): 5 TABLET ORAL at 05:14

## 2023-07-11 RX ADMIN — Medication 975 MILLIGRAM(S): at 13:01

## 2023-07-11 RX ADMIN — Medication 3 MILLILITER(S): at 06:13

## 2023-07-11 RX ADMIN — Medication 975 MILLIGRAM(S): at 17:41

## 2023-07-11 RX ADMIN — Medication 81 MILLIGRAM(S): at 12:25

## 2023-07-11 RX ADMIN — ONDANSETRON 4 MILLIGRAM(S): 8 TABLET, FILM COATED ORAL at 12:52

## 2023-07-11 RX ADMIN — Medication 975 MILLIGRAM(S): at 05:10

## 2023-07-11 RX ADMIN — Medication 12.5 MILLIGRAM(S): at 12:27

## 2023-07-11 RX ADMIN — Medication 12.5 MILLIGRAM(S): at 05:12

## 2023-07-11 RX ADMIN — SODIUM CHLORIDE 4 MILLILITER(S): 9 INJECTION INTRAMUSCULAR; INTRAVENOUS; SUBCUTANEOUS at 22:25

## 2023-07-11 RX ADMIN — NYSTATIN CREAM 1 APPLICATION(S): 100000 CREAM TOPICAL at 05:14

## 2023-07-11 RX ADMIN — LACOSAMIDE 200 MILLIGRAM(S): 50 TABLET ORAL at 17:41

## 2023-07-11 RX ADMIN — ONDANSETRON 4 MILLIGRAM(S): 8 TABLET, FILM COATED ORAL at 08:05

## 2023-07-11 RX ADMIN — Medication 1 DROP(S): at 17:42

## 2023-07-11 RX ADMIN — SCOPALAMINE 1 PATCH: 1 PATCH, EXTENDED RELEASE TRANSDERMAL at 05:30

## 2023-07-11 RX ADMIN — HEPARIN SODIUM 5000 UNIT(S): 5000 INJECTION INTRAVENOUS; SUBCUTANEOUS at 05:11

## 2023-07-11 RX ADMIN — OXYCODONE HYDROCHLORIDE 5 MILLIGRAM(S): 5 TABLET ORAL at 03:42

## 2023-07-11 RX ADMIN — Medication 975 MILLIGRAM(S): at 18:51

## 2023-07-11 RX ADMIN — CHLORHEXIDINE GLUCONATE 1 APPLICATION(S): 213 SOLUTION TOPICAL at 12:36

## 2023-07-11 RX ADMIN — ONDANSETRON 4 MILLIGRAM(S): 8 TABLET, FILM COATED ORAL at 15:58

## 2023-07-11 RX ADMIN — MEROPENEM 100 MILLIGRAM(S): 1 INJECTION INTRAVENOUS at 10:01

## 2023-07-11 RX ADMIN — HEPARIN SODIUM 5000 UNIT(S): 5000 INJECTION INTRAVENOUS; SUBCUTANEOUS at 15:58

## 2023-07-11 RX ADMIN — LACOSAMIDE 200 MILLIGRAM(S): 50 TABLET ORAL at 06:10

## 2023-07-11 RX ADMIN — SCOPALAMINE 1 PATCH: 1 PATCH, EXTENDED RELEASE TRANSDERMAL at 19:21

## 2023-07-11 RX ADMIN — OXYCODONE HYDROCHLORIDE 5 MILLIGRAM(S): 5 TABLET ORAL at 03:41

## 2023-07-11 NOTE — SWALLOW BEDSIDE ASSESSMENT ADULT - SLP PERTINENT HISTORY OF CURRENT PROBLEM
Endoleak, s/p TEVAR revision on 5/5/23, complicated post-op course including resp failure requiring intubation 5/18, mucus plugging s/p bronch 5/19 and PEA arrest, hemorrhagic pancreatitis, s/p J-tube, failed extubation s/p trach 6/5.
53M w PMHx of HTN, type A aortic dissection s/p Dacron grafts, AV resuspension in 2013, CAD s/p CABG x 1 SVG to RCA (5/2013 with Dr. Medina), seizure disorder, recent admission 3/20-4/14 for replacement of transverse aortic arch, second stage TEVAR and aorto-axillary bypass, AV replacement (bio 23mm), and CABG x 1 (SVG-RCA). Pt found to have endo leak and taken to OR for TEVAR revision on 5/5. Complicated hospital course with intubation, placement of feeding J-tube, failed extubation due to PNA s/p trach 6/5 and decannulation 6/28. Stepped down, but returned to SICU (7/2) for respiratory distress. Emergently intubated (7/3-7/9) for aspiration pneumonia, sputum cx grew pseudomonas.

## 2023-07-11 NOTE — SWALLOW BEDSIDE ASSESSMENT ADULT - COMMENTS
Pt tolerating PMV, in situ for swallow eval. Per RN, pt requires endotracheal suction 2-3x/hour.
Pt well known to this svc.   FEES completed 6/28 with recs for soft and bite size and thin liquids via tsp only (no cup).  Made NPO 6/30 2/2 n/v, followed but reintubation 2/2 respiratory distress and likely asp PNA

## 2023-07-11 NOTE — PROGRESS NOTE ADULT - ASSESSMENT
53M w PMHx of HTN, type A aortic dissection s/p Dacron grafts, AV resuspension in 2013, CAD s/p CABG x 1 SVG to RCA (5/2013 with Dr. Medina), seizure disorder, recent admission 3/20-4/14 for replacement of transverse aortic arch, second stage TEVAR and aorto-axillary bypass, AV replacement (bio 23mm), and CABG x 1 (SVG-RCA). Pt found to have endo leak and taken to OR for TEVAR revision on 5/5. Post op course c/b Klebsiella bacteremia (5/15), resp failure requiring intubation on 5/18, Mucus plugging s/p Bronch 5/19 and PEA arrest. Post operatively pt also found to have hemorrhagic pancreatitis with venu-pancreatic abscess possibly 2* to Depakote therefore s/p IR aspiration of peripancreatic fluid collection and paracentesis on 5/22, IR venu-pancreatic abscess drainage and placement of drainage catheter on 5/24. Pt then transferred from CTICU to SICU for septic shock, and further mgmt of hemorrhagic pancreatitis on 5/25. s/p IR placement of 2 new drainage catheters and catheter exchange on 5/26. LUQ drainage 5/30. OR 5/31 for exlap washout & replacement of 3 new JPs and abthera vac with open abdomen. RTOR 6/1, no bleeding, evac of old blood, placement of feeding J-tube, failed extubation due to PNA, completed ABX course and s/p trach 6/5 and decannulation 6/28. stepped down, but returned to SICU (7/2) for respiratory distress. Emergently intubated (7/3) for aspiration pneumonia, sputum cx grew pseudomonas. ARDS improving, patient extubated on 7/9, tolerating room air, mobilizing respiratory secretions.     Plan   - Speech and swallow evaluation   - Rest of care as per SICU team   - Team 1 will follow

## 2023-07-11 NOTE — PROGRESS NOTE ADULT - SUBJECTIVE AND OBJECTIVE BOX
Interval Events: Repeat Hgb stable 8(8.6). No other acute events    Subjective Events: Patient seen and examined at bedside. No acute distress. Secretions audible, patient dorwsy, but able to clear. Endorses nausea, zofran ordered.       Allergies    No Known Allergies    Intolerances        Vital Signs Last 24 Hrs  T(C): 37.2 (11 Jul 2023 17:55), Max: 37.2 (11 Jul 2023 17:55)  T(F): 99 (11 Jul 2023 17:55), Max: 99 (11 Jul 2023 17:55)  HR: 109 (11 Jul 2023 19:00) (98 - 121)  BP: 156/100 (11 Jul 2023 19:00) (147/89 - 173/104)  BP(mean): 121 (11 Jul 2023 19:00) (113 - 132)  RR: 16 (11 Jul 2023 19:00) (11 - 22)  SpO2: 98% (11 Jul 2023 19:00) (92% - 100%)    Parameters below as of 11 Jul 2023 17:00  Patient On (Oxygen Delivery Method): room air        07-10 @ 07:01  -  07-11 @ 07:00  --------------------------------------------------------  IN: 2240 mL / OUT: 900 mL / NET: 1340 mL    07-11 @ 07:01 - 07-11 @ 20:16  --------------------------------------------------------  IN: 850 mL / OUT: 375 mL / NET: 475 mL      07-10 @ 07:01 - 07-11 @ 07:00  --------------------------------------------------------  IN: 2240 mL / OUT: 900 mL / NET: 1340 mL    07-11 @ 07:01 - 07-11 @ 20:16  --------------------------------------------------------  IN: 850 mL / OUT: 375 mL / NET: 475 mL        Physical Exam:     Gen: No acute distress   Neuro: A&OX3 No deficits  CV: Regular rate and ryhthm, S3 murmur on auscultaton, trace peripheral edema   Pulm: Lung sounds clear bilaterally   Abd: Soft, distended, non-tender to palpation   Ext: warm, well-perfused   Vasc: + DP b/l   Skin: Midline incision healing. no rashes noted  MSK: No joint swelling  Psych: No signs of anxiety or depression      LABS:      CBC Full  -  ( 11 Jul 2023 04:48 )  WBC Count : 13.68 K/uL  RBC Count : 2.84 M/uL  Hemoglobin : 8.2 g/dL  Hematocrit : 26.0 %  Platelet Count - Automated : 465 K/uL  Mean Cell Volume : 91.5 fl  Mean Cell Hemoglobin : 28.9 pg  Mean Cell Hemoglobin Concentration : 31.5 gm/dL      07-11    140  |  108  |  17  ----------------------------<  97  4.6   |  24  |  0.46<L>    Ca    8.4      11 Jul 2023 04:48  Phos  3.1     07-11  Mg     2.0     07-11            Urinalysis Basic - ( 11 Jul 2023 04:48 )    Color: x / Appearance: x / SG: x / pH: x  Gluc: 97 mg/dL / Ketone: x  / Bili: x / Urobili: x   Blood: x / Protein: x / Nitrite: x   Leuk Esterase: x / RBC: x / WBC x   Sq Epi: x / Non Sq Epi: x / Bacteria: x              RADIOLOGY & ADDITIONAL STUDIES (The following images were personally reviewed):        ACC: [id number] EXAM:  XR CHEST PORTABLE ROUTINE 1V   ORDERED BY: JASMIN BERMUDEZ     PROCEDURE DATE:  07/11/2023          INTERPRETATION:  Portable chest    HISTORY: Follow-up abnormal exam    IMPRESSION:    Small to moderate size right pleural effusion partially decreased versus   shifting since prior exam 7/10/2023. Atelectasis/infiltrate left lower   lung unchanged. No acute infiltrate appearing. No pneumothorax. Postop   change.    --- End of Report ---

## 2023-07-11 NOTE — SWALLOW BEDSIDE ASSESSMENT ADULT - SLP GENERAL OBSERVATIONS
Pt received asleep, ultimately roused to verbal and tactile cues. Pt tolerated PMV (see PMV eval 6/22). Verbal output was intermittently confused, tangential, confabulatory. Pt did not reliably follow 1-step directives or respond to basic yes/no questions.
Pt awake in bed on RA. Head turned/tilted to left, able to correct to midline with cue. Able to follow commands and participate in simple conversation.

## 2023-07-11 NOTE — PROGRESS NOTE ADULT - SUBJECTIVE AND OBJECTIVE BOX
SUBJECTIVE: Patient seen at bed side during morning rounds. Extubated on 7/9, tolerating room air.       MEDICATIONS  (STANDING):  acetaminophen     Tablet .. 975 milliGRAM(s) Oral every 6 hours  albuterol/ipratropium for Nebulization 3 milliLiter(s) Nebulizer every 6 hours  artificial  tears Solution 1 Drop(s) Both EYES two times a day  aspirin  chewable 81 milliGRAM(s) Oral daily  chlorhexidine 2% Cloths 1 Application(s) Topical daily  dextrose 5%. 1000 milliLiter(s) (100 mL/Hr) IV Continuous <Continuous>  dextrose 50% Injectable 25 Gram(s) IV Push once  glucagon  Injectable 1 milliGRAM(s) IntraMuscular once  glycopyrrolate Injectable 0.1 milliGRAM(s) IV Push every 6 hours  heparin   Injectable 5000 Unit(s) SubCutaneous every 8 hours  insulin lispro (ADMELOG) corrective regimen sliding scale   SubCutaneous every 6 hours  lacosamide Solution 200 milliGRAM(s) Oral two times a day  loperamide Liquid 2 milliGRAM(s) Enteral Tube every 6 hours  metoprolol tartrate 12.5 milliGRAM(s) Enteral Tube every 12 hours  multivitamin/minerals/iron Oral Solution (CENTRUM) 15 milliLiter(s) Oral daily  nystatin Powder 1 Application(s) Topical every 8 hours  oxyCODONE    Solution 5 milliGRAM(s) Oral every 12 hours  pantoprazole  Injectable 40 milliGRAM(s) IV Push daily  scopolamine 1 mG/72 Hr(s) Patch 1 Patch Transdermal every 72 hours  sodium chloride 3%  Inhalation 4 milliLiter(s) Inhalation every 6 hours    MEDICATIONS  (PRN):  dextrose Oral Gel 15 Gram(s) Oral once PRN Blood Glucose LESS THAN 70 milliGRAM(s)/deciliter  melatonin Liquid 5 milliGRAM(s) Oral at bedtime PRN Sleep  ondansetron Injectable 4 milliGRAM(s) IV Push every 8 hours PRN Nausea and/or Vomiting  oxyCODONE    Solution 5 milliGRAM(s) Oral every 6 hours PRN breakthrough pain  sodium chloride 0.9% lock flush 10 milliLiter(s) IV Push every 1 hour PRN Pre/post blood products, medications, blood draw, and to maintain line patency      Drips:     ICU Vital Signs Last 24 Hrs  T(C): 36.8 (11 Jul 2023 09:00), Max: 37.7 (10 Jul 2023 17:55)  T(F): 98.2 (11 Jul 2023 09:00), Max: 99.9 (10 Jul 2023 17:55)  HR: 108 (11 Jul 2023 10:00) (98 - 121)  BP: 165/104 (11 Jul 2023 10:00) (140/84 - 166/102)  BP(mean): 129 (11 Jul 2023 10:00) (106 - 129)  ABP: --  ABP(mean): --  RR: 14 (11 Jul 2023 10:00) (11 - 29)  SpO2: 100% (11 Jul 2023 10:00) (92% - 100%)    O2 Parameters below as of 11 Jul 2023 10:00  Patient On (Oxygen Delivery Method): room air          Physical Exam:  Neurological: alert   ENT: mucus membrane moist  Cardiovascular: RRR + murmur  Respiratory: CTA  Gastrointestinal: soft, NT, ND, BS+, midline incision healing, no erythema, no bleeding, no pus.   Extremities: warm, 2+ dependent edema  Vascular: no cyanosis/erythema  Skin: no rashes  MSK: no joint swelling.   Psych: No signs of anxiety or depression      Lines/tubes/drains:  [place]:	      Vent settings:      I&O's Summary    10 Jul 2023 07:01  -  11 Jul 2023 07:00  --------------------------------------------------------  IN: 2240 mL / OUT: 900 mL / NET: 1340 mL    11 Jul 2023 07:01  -  11 Jul 2023 11:05  --------------------------------------------------------  IN: 120 mL / OUT: 0 mL / NET: 120 mL        LABS:                        8.2    13.68 )-----------( 465      ( 11 Jul 2023 04:48 )             26.0     07-11    140  |  108  |  17  ----------------------------<  97  4.6   |  24  |  0.46<L>    Ca    8.4      11 Jul 2023 04:48  Phos  3.1     07-11  Mg     2.0     07-11        Urinalysis Basic - ( 11 Jul 2023 04:48 )    Color: x / Appearance: x / SG: x / pH: x  Gluc: 97 mg/dL / Ketone: x  / Bili: x / Urobili: x   Blood: x / Protein: x / Nitrite: x   Leuk Esterase: x / RBC: x / WBC x   Sq Epi: x / Non Sq Epi: x / Bacteria: x      CAPILLARY BLOOD GLUCOSE      POCT Blood Glucose.: 98 mg/dL (11 Jul 2023 05:23)  POCT Blood Glucose.: 98 mg/dL (11 Jul 2023 00:06)  POCT Blood Glucose.: 95 mg/dL (10 Jul 2023 17:56)  POCT Blood Glucose.: 88 mg/dL (10 Jul 2023 12:00)

## 2023-07-11 NOTE — SWALLOW BEDSIDE ASSESSMENT ADULT - SWALLOW EVAL: DIAGNOSIS
Pt presents at a heightened risk for aspiration related PNA given complicated hospital course including multiple intubations, recent hx of trach, and recent aspiration PNA dx. Objective assessment of swallow physiology indicated prior to initiation of diet. Recommend allow periodic ice chips after oral care for swallow practice and moisture.
Mentation does not support PO diet at this time; an oral diet is neither safe nor functional. SLP will continue to follow closely; as mentation improves, pt will require instrumental swallow assessment given s/p trach, complicated hospital course with multiple comorbidities, AMS, prolonged period of NPO, and overall debility.

## 2023-07-11 NOTE — SWALLOW BEDSIDE ASSESSMENT ADULT - PHARYNGEAL PHASE
Hyolaryngeal movement palpated. Throat clear x1 with thin liquids via cup. No other overt s/s of aspiration.

## 2023-07-11 NOTE — PROGRESS NOTE ADULT - ASSESSMENT
A/p: 53M w PMHx of HTN, type A aortic dissection s/p Dacron grafts, AV resuspension in 2013, CAD s/p CABG x 1 SVG to RCA (5/2013 with Dr. Medina), seizure disorder, recent admission 3/20-4/14 for replacement of transverse aortic arch, second stage TEVAR and aorto-axillary bypass, AV replacement (bio 23mm), and CABG x 1 (SVG-RCA). Pt found to have endo leak and taken to OR for TEVAR revision on 5/5. Post op course c/b Klebsiella bacteremia (5/15), resp failure requiring intubation on 5/18, Mucus plugging s/p Bronch 5/19 and PEA arrest. Post operatively pt also found to have hemorrhagic pancreatitis with venu-pancreatic abscess possibly 2* to Depakote therefore s/p IR aspiration of peripancreatic fluid collection and paracentesis on 5/22, IR venu-pancreatic abscess drainage and placement of drainage catheter on 5/24. Pt then transferred from CTICU to SICU for septic shock, and further mgmt of hemorrhagic pancreatitis on 5/25. s/p IR placement of 2 new drainage catheters and catheter exchange on 5/26. LUQ drainage 5/30. OR 5/31 for exlap washout & replacement of 3 new JPs and abthera vac with open abdomen. RTOR 6/1, no bleeding, evac of old blood, placement of feeding J-tube, failed extubation due to PNA, completed ABX course and s/p trach 6/5 and decannulation 6/28. stepped down, but returned to SICU (7/2) for respiratory distress. Emergently intubated (7/3) for aspiration pneumonia, sputum cx grew pseudomonas. Extubated on 7/9.    NEURO: Pain control with OxyIR q12 standing and Oxy 5 q6 prn breakthrough,  Hx seizures: cont vimpat. Avoid depakote due to drug-related pancreatitis risk.   HEENT: Artificial tears, Torticollis improved  CV: s/p PEA arrest on 5/24 night. TTE (6/14) LVEF 75%, LVH, biatrial enlargement and elevated PA pressure (elevated from previous), mild to mod MR/TR . Cont ASA 81mg. Increase metoprolol today 25 BID.    PULM: Aspiration PNA: S/p Reintubation on 7/3 and extubation on 7/9 - Cont Duonebs, 3% saline nebs, tolerating trial of scopolamine patch for secretions, glycopyrrolate q6h for secretions. Hx of recent ARDS/PNA resolved and trach decannulated 6/28.   GI/FEN: Speech and Swallow. FEES pending. NPO until discussed at length with attending. TF Vital 1.0 at 70cc via feeding J-tube with LPS x3, diarrhea on imodium, MTV, Protonix. Hemorrhagic pancreatitis: s/p multiple drain placements and paracentisis by IR team. Cdiff negative (6/19).  : acute renal failure- been off CVVHD since mid June with renal recovery. Voids.  HEME: Acute blood loss anemia: hg stable after 1 unit PRBC 7/10.   ENDO: mISS  ID: Sepsis without shock, Pseudomonas in Sputum: on Meropenem( 7/7-) as per Surgical team request.     - PRIOR ABX: Ertapenem (6/13-6/16, 6/18-6/23), meropenem (5/21-6/5, 6/9-6/13, 7/2-7/5), vanco (6/9, 6/18-6/22, 7/2-7/5), caspofungin (5/23-5/30, 6/9-6/12, 6/18-6/21), Ceftriaxone( 6/5- 6/15), Ampicillin (5/21- 5/27). Zosyn: ( 7/- 7/7)   - Prior Cultures: Kleb bacteremia from 5/15 & 5/17. bronch cx kleb, enterobacter (zosyn, erta resistant), E faecalis, strep angionosus from 5/19, pancreatic abscess cx pan-sensitive kleb 5/22.   PPX: SCDs, SQH  LINES: PIVs // L rad kalpana (5/31-6/15), 5Fr PIV in LUE (6/13-15), Lsubclav HD Cath (5/31-6/12), RIJ TLC (5/22-5/27), RIJ HD cath (5/22-31), R Ax kalpana(5/12-5/31), LIJ TLC (5/23-6/1), RIJ TLC (6/1-13)   WOUNDS/DRAINS: CODIE removed. midline incision healed.   PT: PT/OT consult requested for early mobilization.   Dispo: SICU

## 2023-07-11 NOTE — SWALLOW BEDSIDE ASSESSMENT ADULT - NS SPL SWALLOW CLINIC TRIAL FT
Pt with poor bolus acceptance and manipulation of ice chip x2, with open mouth posture and absent oral motor movement despite verbal and tactile cues. Pt ultimately closed oral cavity and let ice chip melt, with ~20 second period of bolus holding, requiring frequent verbal cues to initiate pharyngeal swallow. Laryngeal elevation was appreciated only on initial ice chip trial, not on second, indicating pt likely did not initiate pharyngeal swallow for 2nd trial. Throat clearing x2 noted following 2nd trial, potentially suggestive of aspiration.
Initially when HOB was raised, pt endorsed some nausea, which shortly subsided.

## 2023-07-12 LAB
ANION GAP SERPL CALC-SCNC: 5 MMOL/L — SIGNIFICANT CHANGE UP (ref 5–17)
BUN SERPL-MCNC: 16 MG/DL — SIGNIFICANT CHANGE UP (ref 7–23)
CALCIUM SERPL-MCNC: 8.1 MG/DL — LOW (ref 8.4–10.5)
CHLORIDE SERPL-SCNC: 108 MMOL/L — SIGNIFICANT CHANGE UP (ref 96–108)
CO2 SERPL-SCNC: 25 MMOL/L — SIGNIFICANT CHANGE UP (ref 22–31)
CREAT SERPL-MCNC: 0.46 MG/DL — LOW (ref 0.5–1.3)
EGFR: 124 ML/MIN/1.73M2 — SIGNIFICANT CHANGE UP
GLUCOSE BLDC GLUCOMTR-MCNC: 100 MG/DL — HIGH (ref 70–99)
GLUCOSE BLDC GLUCOMTR-MCNC: 101 MG/DL — HIGH (ref 70–99)
GLUCOSE BLDC GLUCOMTR-MCNC: 85 MG/DL — SIGNIFICANT CHANGE UP (ref 70–99)
GLUCOSE BLDC GLUCOMTR-MCNC: 89 MG/DL — SIGNIFICANT CHANGE UP (ref 70–99)
GLUCOSE BLDC GLUCOMTR-MCNC: 95 MG/DL — SIGNIFICANT CHANGE UP (ref 70–99)
GLUCOSE SERPL-MCNC: 99 MG/DL — SIGNIFICANT CHANGE UP (ref 70–99)
HCT VFR BLD CALC: 24.8 % — LOW (ref 39–50)
HGB BLD-MCNC: 7.9 G/DL — LOW (ref 13–17)
MAGNESIUM SERPL-MCNC: 2 MG/DL — SIGNIFICANT CHANGE UP (ref 1.6–2.6)
MCHC RBC-ENTMCNC: 29.4 PG — SIGNIFICANT CHANGE UP (ref 27–34)
MCHC RBC-ENTMCNC: 31.9 GM/DL — LOW (ref 32–36)
MCV RBC AUTO: 92.2 FL — SIGNIFICANT CHANGE UP (ref 80–100)
NRBC # BLD: 0 /100 WBCS — SIGNIFICANT CHANGE UP (ref 0–0)
PHOSPHATE SERPL-MCNC: 3.6 MG/DL — SIGNIFICANT CHANGE UP (ref 2.5–4.5)
PLATELET # BLD AUTO: 484 K/UL — HIGH (ref 150–400)
POTASSIUM SERPL-MCNC: 4.4 MMOL/L — SIGNIFICANT CHANGE UP (ref 3.5–5.3)
POTASSIUM SERPL-SCNC: 4.4 MMOL/L — SIGNIFICANT CHANGE UP (ref 3.5–5.3)
RBC # BLD: 2.69 M/UL — LOW (ref 4.2–5.8)
RBC # FLD: 18.3 % — HIGH (ref 10.3–14.5)
SODIUM SERPL-SCNC: 138 MMOL/L — SIGNIFICANT CHANGE UP (ref 135–145)
WBC # BLD: 12.98 K/UL — HIGH (ref 3.8–10.5)
WBC # FLD AUTO: 12.98 K/UL — HIGH (ref 3.8–10.5)

## 2023-07-12 PROCEDURE — 71045 X-RAY EXAM CHEST 1 VIEW: CPT | Mod: 26

## 2023-07-12 PROCEDURE — 99233 SBSQ HOSP IP/OBS HIGH 50: CPT | Mod: GC

## 2023-07-12 RX ORDER — METOPROLOL TARTRATE 50 MG
50 TABLET ORAL EVERY 12 HOURS
Refills: 0 | Status: DISCONTINUED | OUTPATIENT
Start: 2023-07-12 | End: 2023-07-17

## 2023-07-12 RX ORDER — ACETAMINOPHEN 500 MG
975 TABLET ORAL EVERY 6 HOURS
Refills: 0 | Status: DISCONTINUED | OUTPATIENT
Start: 2023-07-12 | End: 2023-07-17

## 2023-07-12 RX ORDER — AMLODIPINE BESYLATE 2.5 MG/1
5 TABLET ORAL DAILY
Refills: 0 | Status: DISCONTINUED | OUTPATIENT
Start: 2023-07-12 | End: 2023-07-12

## 2023-07-12 RX ADMIN — Medication 975 MILLIGRAM(S): at 06:06

## 2023-07-12 RX ADMIN — Medication 975 MILLIGRAM(S): at 18:36

## 2023-07-12 RX ADMIN — Medication 3 MILLILITER(S): at 05:42

## 2023-07-12 RX ADMIN — SODIUM CHLORIDE 4 MILLILITER(S): 9 INJECTION INTRAMUSCULAR; INTRAVENOUS; SUBCUTANEOUS at 10:57

## 2023-07-12 RX ADMIN — OXYCODONE HYDROCHLORIDE 5 MILLIGRAM(S): 5 TABLET ORAL at 16:40

## 2023-07-12 RX ADMIN — LACOSAMIDE 200 MILLIGRAM(S): 50 TABLET ORAL at 06:06

## 2023-07-12 RX ADMIN — Medication 2 MILLIGRAM(S): at 06:57

## 2023-07-12 RX ADMIN — HEPARIN SODIUM 5000 UNIT(S): 5000 INJECTION INTRAVENOUS; SUBCUTANEOUS at 00:05

## 2023-07-12 RX ADMIN — Medication 1 DROP(S): at 06:05

## 2023-07-12 RX ADMIN — Medication 3 MILLILITER(S): at 17:44

## 2023-07-12 RX ADMIN — HEPARIN SODIUM 5000 UNIT(S): 5000 INJECTION INTRAVENOUS; SUBCUTANEOUS at 06:07

## 2023-07-12 RX ADMIN — Medication 25 MILLIGRAM(S): at 06:06

## 2023-07-12 RX ADMIN — LACOSAMIDE 200 MILLIGRAM(S): 50 TABLET ORAL at 18:35

## 2023-07-12 RX ADMIN — SODIUM CHLORIDE 4 MILLILITER(S): 9 INJECTION INTRAMUSCULAR; INTRAVENOUS; SUBCUTANEOUS at 17:43

## 2023-07-12 RX ADMIN — OXYCODONE HYDROCHLORIDE 5 MILLIGRAM(S): 5 TABLET ORAL at 06:06

## 2023-07-12 RX ADMIN — ROBINUL 0.1 MILLIGRAM(S): 0.2 INJECTION INTRAMUSCULAR; INTRAVENOUS at 23:20

## 2023-07-12 RX ADMIN — SODIUM CHLORIDE 4 MILLILITER(S): 9 INJECTION INTRAMUSCULAR; INTRAVENOUS; SUBCUTANEOUS at 23:32

## 2023-07-12 RX ADMIN — Medication 975 MILLIGRAM(S): at 12:15

## 2023-07-12 RX ADMIN — Medication 3 MILLILITER(S): at 23:31

## 2023-07-12 RX ADMIN — OXYCODONE HYDROCHLORIDE 5 MILLIGRAM(S): 5 TABLET ORAL at 06:57

## 2023-07-12 RX ADMIN — Medication 975 MILLIGRAM(S): at 01:46

## 2023-07-12 RX ADMIN — Medication 50 MILLIGRAM(S): at 18:38

## 2023-07-12 RX ADMIN — ROBINUL 0.1 MILLIGRAM(S): 0.2 INJECTION INTRAMUSCULAR; INTRAVENOUS at 18:36

## 2023-07-12 RX ADMIN — Medication 3 MILLILITER(S): at 11:13

## 2023-07-12 RX ADMIN — Medication 975 MILLIGRAM(S): at 06:57

## 2023-07-12 RX ADMIN — Medication 975 MILLIGRAM(S): at 23:20

## 2023-07-12 RX ADMIN — Medication 15 MILLILITER(S): at 13:35

## 2023-07-12 RX ADMIN — NYSTATIN CREAM 1 APPLICATION(S): 100000 CREAM TOPICAL at 21:18

## 2023-07-12 RX ADMIN — ROBINUL 0.1 MILLIGRAM(S): 0.2 INJECTION INTRAMUSCULAR; INTRAVENOUS at 00:05

## 2023-07-12 RX ADMIN — Medication 2 MILLIGRAM(S): at 12:16

## 2023-07-12 RX ADMIN — PANTOPRAZOLE SODIUM 40 MILLIGRAM(S): 20 TABLET, DELAYED RELEASE ORAL at 12:14

## 2023-07-12 RX ADMIN — Medication 975 MILLIGRAM(S): at 19:36

## 2023-07-12 RX ADMIN — NYSTATIN CREAM 1 APPLICATION(S): 100000 CREAM TOPICAL at 15:39

## 2023-07-12 RX ADMIN — Medication 2 MILLIGRAM(S): at 00:21

## 2023-07-12 RX ADMIN — Medication 975 MILLIGRAM(S): at 13:15

## 2023-07-12 RX ADMIN — OXYCODONE HYDROCHLORIDE 5 MILLIGRAM(S): 5 TABLET ORAL at 15:40

## 2023-07-12 RX ADMIN — Medication 2 MILLIGRAM(S): at 18:53

## 2023-07-12 RX ADMIN — NYSTATIN CREAM 1 APPLICATION(S): 100000 CREAM TOPICAL at 06:05

## 2023-07-12 RX ADMIN — HEPARIN SODIUM 5000 UNIT(S): 5000 INJECTION INTRAVENOUS; SUBCUTANEOUS at 14:06

## 2023-07-12 RX ADMIN — Medication 81 MILLIGRAM(S): at 12:14

## 2023-07-12 RX ADMIN — Medication 975 MILLIGRAM(S): at 00:05

## 2023-07-12 RX ADMIN — SODIUM CHLORIDE 4 MILLILITER(S): 9 INJECTION INTRAMUSCULAR; INTRAVENOUS; SUBCUTANEOUS at 05:42

## 2023-07-12 RX ADMIN — Medication 25 MILLIGRAM(S): at 00:06

## 2023-07-12 RX ADMIN — ROBINUL 0.1 MILLIGRAM(S): 0.2 INJECTION INTRAMUSCULAR; INTRAVENOUS at 12:14

## 2023-07-12 RX ADMIN — CALAMINE AND ZINC OXIDE AND PHENOL 1 APPLICATION(S): 160; 10 LOTION TOPICAL at 06:05

## 2023-07-12 RX ADMIN — ROBINUL 0.1 MILLIGRAM(S): 0.2 INJECTION INTRAMUSCULAR; INTRAVENOUS at 06:07

## 2023-07-12 RX ADMIN — Medication 1 DROP(S): at 18:34

## 2023-07-12 RX ADMIN — HEPARIN SODIUM 5000 UNIT(S): 5000 INJECTION INTRAVENOUS; SUBCUTANEOUS at 21:19

## 2023-07-12 RX ADMIN — NYSTATIN CREAM 1 APPLICATION(S): 100000 CREAM TOPICAL at 00:06

## 2023-07-12 RX ADMIN — SCOPALAMINE 1 PATCH: 1 PATCH, EXTENDED RELEASE TRANSDERMAL at 06:58

## 2023-07-12 RX ADMIN — CHLORHEXIDINE GLUCONATE 1 APPLICATION(S): 213 SOLUTION TOPICAL at 07:00

## 2023-07-12 RX ADMIN — CALAMINE AND ZINC OXIDE AND PHENOL 1 APPLICATION(S): 160; 10 LOTION TOPICAL at 17:39

## 2023-07-12 NOTE — SWALLOW FEES ASSESSMENT ADULT - PHARYNGEAL PHASE COMMENTS
Swallow was triggered at the level of mid pharynx. Reduced supraglottic closure resulted in deep penetration of thin liquids and mildly thick liquids administered by cup sips. Volume control with administration of thin liquids via tsp resulted in adequate airway protection. Reduced pharyngeal stripping wave resulted in valleculae residue (~10%). Residue cleared with secondary swallows. Suspected reduced hyolaryngeal complex movement resulted in residue (~10%) at the UES which also cleared with secondary swallows.
Onset of pharyngeal swallow trigger was timely across consistencies. Deep laryngeal penetration, to the level of the TVF's, occurred during the swallow with STRAW sips of thin liquid as a result of mistimed/incomplete laryngeal vestibule closure. Reduced pharyngeal squeeze resulted in residue along/within all pharyngeal structures as well as within the interarytenoid space (Solids>liquids). Post-deglutitive overflow penetration of solid/liquid residue occurred during a volitional cough which was elicited to clear penetrated material. Large volume penetrate and suspected aspirate of mildly thick liquid residue during a secondary swallow as a result of increased residue.

## 2023-07-12 NOTE — SWALLOW FEES ASSESSMENT ADULT - RECOMMENDED CONSISTENCY
THIN LIQUIDS + MINCED AND MOIST SOLIDS
1. PMV in place with ALL PO trials 2. Soft and bite sized with thin liquids. 3. All liquids by TEASPOON ONLY. 4. NO CUP SIPS

## 2023-07-12 NOTE — SWALLOW FEES ASSESSMENT ADULT - ADDITIONAL RECOMMENDATIONS
Control risk factors for dysphagia-related aspiration PNA via frequent oral hygiene and increasing physical mobility as possible    Pt will improve pharyngeal wall contraction via Effortful Swallow x50/tx.

## 2023-07-12 NOTE — SWALLOW FEES ASSESSMENT ADULT - ROSENBEK'S PENETRATION ASPIRATION SCALE
(5) material contacts vocal cords, visible residue remains (penetration)
(5) material contacts vocal cords, visible residue remains (penetration)

## 2023-07-12 NOTE — PROGRESS NOTE ADULT - ASSESSMENT
A/p: 53M w PMHx of HTN, type A aortic dissection s/p Dacron grafts, AV resuspension in 2013, CAD s/p CABG x 1 SVG to RCA (5/2013 with Dr. Medina), seizure disorder, recent admission 3/20-4/14 for replacement of transverse aortic arch, second stage TEVAR and aorto-axillary bypass, AV replacement (bio 23mm), and CABG x 1 (SVG-RCA). Pt found to have endo leak and taken to OR for TEVAR revision on 5/5. Post op course c/b Klebsiella bacteremia (5/15), resp failure requiring intubation on 5/18, Mucus plugging s/p Bronch 5/19 and PEA arrest. Post operatively pt also found to have hemorrhagic pancreatitis with venu-pancreatic abscess possibly 2* to Depakote therefore s/p IR aspiration of peripancreatic fluid collection and paracentesis on 5/22, IR venu-pancreatic abscess drainage and placement of drainage catheter on 5/24. Pt then transferred from CTICU to SICU for septic shock, and further mgmt of hemorrhagic pancreatitis on 5/25. s/p IR placement of 2 new drainage catheters and catheter exchange on 5/26. LUQ drainage 5/30. OR 5/31 for exlap washout & replacement of 3 new JPs and abthera vac with open abdomen. RTOR 6/1, no bleeding, evac of old blood, placement of feeding J-tube, failed extubation due to PNA, completed ABX course and s/p trach 6/5 and decannulation 6/28. stepped down, but returned to SICU (7/2) for respiratory distress. Emergently intubated (7/3) for aspiration pneumonia, sputum cx grew pseudomonas. Extubated on 7/9.    NEURO: Pain control with OxyIR q12 standing and Oxy 5 q6 prn breakthrough,  Hx seizures: cont vimpat. Avoid depakote due to drug-related pancreatitis risk.   HEENT: Artificial tears, Torticollis improved  CV: s/p PEA arrest on 5/24 night. TTE (6/14) LVEF 75%, LVH, biatrial enlargement and elevated PA pressure (elevated from previous), mild to mod MR/TR . Cont ASA 81mg. Increase metoprolol today 25 BID.    PULM: Aspiration PNA: S/p Reintubation on 7/3 and extubation on 7/9 - Cont Duonebs, 3% saline nebs, tolerating trial of scopolamine patch for secretions, glycopyrrolate q6h for secretions. Hx of recent ARDS/PNA resolved and trach decannulated 6/28.   GI/FEN: Speech and Swallow. FEES pending. NPO until discussed at length with attending. TF Vital 1.0 at 70cc via feeding J-tube with LPS x3, diarrhea on imodium, MTV, Protonix. Hemorrhagic pancreatitis: s/p multiple drain placements and paracentisis by IR team. Cdiff negative (6/19).  : acute renal failure- been off CVVHD since mid June with renal recovery. Voids.  HEME: Acute blood loss anemia: hg stable after 1 unit PRBC 7/10.   ENDO: mISS  ID: Sepsis without shock, Pseudomonas in Sputum: on Meropenem( 7/7-7/11) as per Surgical team request.     - PRIOR ABX: Ertapenem (6/13-6/16, 6/18-6/23), meropenem (5/21-6/5, 6/9-6/13, 7/2-7/5), vanco (6/9, 6/18-6/22, 7/2-7/5), caspofungin (5/23-5/30, 6/9-6/12, 6/18-6/21), Ceftriaxone( 6/5- 6/15), Ampicillin (5/21- 5/27). Zosyn: ( 7/- 7/7)   - Prior Cultures: Kleb bacteremia from 5/15 & 5/17. bronch cx kleb, enterobacter (zosyn, erta resistant), E faecalis, strep angionosus from 5/19, pancreatic abscess cx pan-sensitive kleb 5/22.   PPX: SCDs, SQH  LINES: PIVs // L rad kalpana (5/31-6/15), 5Fr PIV in LUE (6/13-15), Lsubclav HD Cath (5/31-6/12), RIJ TLC (5/22-5/27), RIJ HD cath (5/22-31), R Ax kalpana(5/12-5/31), LIJ TLC (5/23-6/1), RIJ TLC (6/1-13)   WOUNDS/DRAINS: CODIE removed. midline incision healed.   PT: PT/OT consult requested for early mobilization.   Dispo: SICU   A/p: 53M w PMHx of HTN, type A aortic dissection s/p Dacron grafts, AV resuspension in 2013, CAD s/p CABG x 1 SVG to RCA (5/2013 with Dr. Medina), seizure disorder, recent admission 3/20-4/14 for replacement of transverse aortic arch, second stage TEVAR and aorto-axillary bypass, AV replacement (bio 23mm), and CABG x 1 (SVG-RCA). Pt found to have endo leak and taken to OR for TEVAR revision on 5/5. Post op course c/b Klebsiella bacteremia (5/15), resp failure requiring intubation on 5/18, Mucus plugging s/p Bronch 5/19 and PEA arrest. Post operatively pt also found to have hemorrhagic pancreatitis with venu-pancreatic abscess possibly 2* to Depakote therefore s/p IR aspiration of peripancreatic fluid collection and paracentesis on 5/22, IR venu-pancreatic abscess drainage and placement of drainage catheter on 5/24. Pt then transferred from CTICU to SICU for septic shock, and further mgmt of hemorrhagic pancreatitis on 5/25. s/p IR placement of 2 new drainage catheters and catheter exchange on 5/26. LUQ drainage 5/30. OR 5/31 for exlap washout & replacement of 3 new JPs and abthera vac with open abdomen. RTOR 6/1, no bleeding, evac of old blood, placement of feeding J-tube, failed extubation due to PNA, completed ABX course and s/p trach 6/5 and decannulation 6/28. stepped down, but returned to SICU (7/2) for respiratory distress. Emergently intubated (7/3) for aspiration pneumonia, sputum cx grew pseudomonas. Extubated on 7/9.    NEURO: Standing oxycodone d/c'ed 2/2 drowsiness. Prn Oxycodone. Hx seizures: cont vimpat. Avoid depakote due to drug-related pancreatitis risk.   HEENT: Artificial tears, Torticollis improved  CV: s/p PEA arrest on 5/24 night. TTE (6/14) LVEF 75%, LVH, biatrial enlargement and elevated PA pressure (elevated from previous), mild to mod MR/TR . Cont ASA 81mg. Increase metoprolol today 50 BID.    PULM: Aspiration PNA: S/p Reintubation on 7/3 and extubation on 7/9 - Cont Duonebs, 3% saline nebs, Scopalamine d/c;ed, continue glycopyrrolate q6h for secretions. Hx of recent ARDS/PNA resolved and trach decannulated 6/28.   GI/FEN: Speech and Swallow. FEES performed. Recommending minced and moist with thin liquids. No signs of aspiration. TF Vital 1.0 at 70cc via feeding J-tube with LPS x3, diarrhea on imodium, MTV, Protonix. Hemorrhagic pancreatitis: s/p multiple drain placements and paracentisis by IR team. Cdiff negative (6/19).  : acute renal failure- been off CVVHD since mid June with renal recovery. Voids.  HEME: Acute blood loss anemia: hg stable after 1 unit PRBC 7/10.   ENDO: mISS  ID: Sepsis without shock, Pseudomonas in Sputum: on Meropenem( 7/7-7/11) as per Surgical team request.     - PRIOR ABX: Ertapenem (6/13-6/16, 6/18-6/23), meropenem (5/21-6/5, 6/9-6/13, 7/2-7/5), vanco (6/9, 6/18-6/22, 7/2-7/5), caspofungin (5/23-5/30, 6/9-6/12, 6/18-6/21), Ceftriaxone( 6/5- 6/15), Ampicillin (5/21- 5/27). Zosyn: ( 7/- 7/7)   - Prior Cultures: Kleb bacteremia from 5/15 & 5/17. bronch cx kleb, enterobacter (zosyn, erta resistant), E faecalis, strep angionosus from 5/19, pancreatic abscess cx pan-sensitive kleb 5/22.   PPX: SCDs, SQH  LINES: PIVs // L rad kalpana (5/31-6/15), 5Fr PIV in LUE (6/13-15), Lsubclav HD Cath (5/31-6/12), RIJ TLC (5/22-5/27), RIJ HD cath (5/22-31), R Ax kalpana(5/12-5/31), LIJ TLC (5/23-6/1), RIJ TLC (6/1-13)   WOUNDS/DRAINS: CODIE removed. midline incision healed.   PT: PT/OT consult requested for early mobilization.   Dispo: SICU

## 2023-07-12 NOTE — SWALLOW FEES ASSESSMENT ADULT - COMMENTS
Risks, benefits, and alternatives to this study were reviewed with pt and medical team who were in agreement with proceeding with this study. Flexible endoscope passed transnasally to further assess swallow anatomy and physiology. Pt tolerated the passing of the scope and its presence.
Pt with #8 cuffed trach cuff deflated. PMV not in place since Pt reports he can voice without it. Education provided regarding benefits of PMV and importance of consistent wear. Pt verbalized understanding.  Risks, benefits and alternatives of this procedure were explained to Pt. He verbalized agreement to proceed. Pt tolerated passage and presence of scope without difficulty. Scope passed through L nare. Yumiko Ovalle, SLP provided feeding assistance.

## 2023-07-12 NOTE — PROGRESS NOTE ADULT - ASSESSMENT
53M w PMHx of HTN, type A aortic dissection s/p Dacron grafts, AV resuspension in 2013, CAD s/p CABG x 1 SVG to RCA (5/2013 with Dr. Medina), seizure disorder, recent admission 3/20-4/14 for replacement of transverse aortic arch, second stage TEVAR and aorto-axillary bypass, AV replacement (bio 23mm), and CABG x 1 (SVG-RCA). Pt found to have endo leak and taken to OR for TEVAR revision on 5/5. Post op course c/b Klebsiella bacteremia (5/15), resp failure requiring intubation on 5/18, Mucus plugging s/p Bronch 5/19 and PEA arrest. Post operatively pt also found to have hemorrhagic pancreatitis with venu-pancreatic abscess possibly 2* to Depakote therefore s/p IR aspiration of peripancreatic fluid collection and paracentesis on 5/22, IR venu-pancreatic abscess drainage and placement of drainage catheter on 5/24. Pt then transferred from CTICU to SICU for septic shock, and further mgmt of hemorrhagic pancreatitis on 5/25. s/p IR placement of 2 new drainage catheters and catheter exchange on 5/26. LUQ drainage 5/30. OR 5/31 for exlap washout & replacement of 3 new JPs and abthera vac with open abdomen. RTOR 6/1, no bleeding, evac of old blood, placement of feeding J-tube, failed extubation due to PNA, completed ABX course and s/p trach 6/5 and decannulation 6/28. stepped down, but returned to SICU (7/2) for respiratory distress. Emergently intubated (7/3) for aspiration pneumonia, sputum cx grew pseudomonas. ARDS improving, patient extubated on 7/9, tolerating room air, mobilizing respiratory secretions. Patient tolerating thick liquids.     Plan   - Likely step down tomorrow   - Rest of care as per SICU team   - Team 1 will follow

## 2023-07-12 NOTE — CHART NOTE - NSCHARTNOTEFT_GEN_A_CORE
Admitting Diagnosis:   Patient is a 53y old  Male who presents with a chief complaint of endoleak, abdominal pain (05 Jul 2023 11:00)      PAST MEDICAL & SURGICAL HISTORY:  HTN (hypertension)      Aortic dissection      CAD (coronary artery disease)      Seizure disorder      Seizure disorder      Hypertension      Status post endovascular aneurysm repair (EVAR)      S/P aortic bifurcation bypass graft      S/P CABG x 1          Current Nutrition Order: NPO; Vital 1.0 @70ml/hr x 24hrs; provides 1680ml total volume, 1680 kcals, 67.2g protein, 1401ml free water daily + 2 LPS daily [provides 200 kcals, 30g protein]    PO Intake: Good (%) [   ]  Fair (50-75%) [   ] Poor (<25%) [   ]- N/A    GI Issues: nontender/nondistended, loose BM/diarrhea noted     Pain: No pain/absence of nonverbal indicators of pain    Skin Integrity: stage II PUs to sacrum & L scapula, penis, skin tear to L ear, 2+ edema R arm, 3+ edema L/R foot    Labs:   07-12    138  |  108  |  16  ----------------------------<  99  4.4   |  25  |  0.46<L>    Ca    8.1<L>      12 Jul 2023 05:30  Phos  3.6     07-12  Mg     2.0     07-12    CAPILLARY BLOOD GLUCOSE      POCT Blood Glucose.: 95 mg/dL (12 Jul 2023 11:49)      Medications:  MEDICATIONS  (STANDING):  acetaminophen   Oral Liquid .. 975 milliGRAM(s) Oral every 6 hours  albuterol/ipratropium for Nebulization 3 milliLiter(s) Nebulizer every 6 hours  artificial  tears Solution 1 Drop(s) Both EYES two times a day  aspirin  chewable 81 milliGRAM(s) Oral daily  calamine/zinc oxide Lotion 1 Application(s) Topical two times a day  chlorhexidine 2% Cloths 1 Application(s) Topical daily  dextrose 5%. 1000 milliLiter(s) (100 mL/Hr) IV Continuous <Continuous>  dextrose 50% Injectable 25 Gram(s) IV Push once  glucagon  Injectable 1 milliGRAM(s) IntraMuscular once  glycopyrrolate Injectable 0.1 milliGRAM(s) IV Push every 6 hours  heparin   Injectable 5000 Unit(s) SubCutaneous every 8 hours  insulin lispro (ADMELOG) corrective regimen sliding scale   SubCutaneous every 6 hours  lacosamide Solution 200 milliGRAM(s) Oral two times a day  loperamide Liquid 2 milliGRAM(s) Enteral Tube every 6 hours  metoprolol tartrate 50 milliGRAM(s) Oral every 12 hours  multivitamin/minerals/iron Oral Solution (CENTRUM) 15 milliLiter(s) Oral daily  nystatin Powder 1 Application(s) Topical every 8 hours  pantoprazole  Injectable 40 milliGRAM(s) IV Push daily  sodium chloride 3%  Inhalation 4 milliLiter(s) Inhalation every 6 hours    MEDICATIONS  (PRN):  dextrose Oral Gel 15 Gram(s) Oral once PRN Blood Glucose LESS THAN 70 milliGRAM(s)/deciliter  melatonin Liquid 5 milliGRAM(s) Oral at bedtime PRN Sleep  ondansetron Injectable 4 milliGRAM(s) IV Push every 8 hours PRN Nausea and/or Vomiting  oxyCODONE    Solution 5 milliGRAM(s) Oral every 6 hours PRN breakthrough pain  sodium chloride 0.9% lock flush 10 milliLiter(s) IV Push every 1 hour PRN Pre/post blood products, medications, blood draw, and to maintain line patency        Weight: 91kg [10 Ghazala 2023]  Daily   90.9kg [7 June 2023]  Daily 83.3kg [4 July 2023]    Weight Change: 7.7kg/8.4% wt loss x 1 month- ? 2/2 fluid shifts/inadequate nutrition. Need reweight to confirm. Please obtain weekly weights to ensure adequacy of nutrition provision    Estimated energy needs:   Ideal body weight (80.9kg) used for calculations as pt >100% of IBW, BMI <30 per [facility] Standards of Care. Needs estimated for age and adjusted for current clinical status     Calories: 2022.5-2427 kcals based on 25-30 kcals/kg  Protein: 113.3-129.4g protein based on 1.4-1.6g protein/kg  *Fluid Needs per team    Subjective:   53M w PMHx of HTN, type A aortic dissection s/p Dacron grafts, AV resuspension in 2013, CAD s/p CABG x 1 SVG to RCA (5/2013 with Dr. Medina), seizure disorder, recent admission 3/20-4/14 for replacement of transverse aortic arch, second stage TEVAR and aorto-axillary bypass, AV replacement (bio 23mm), and CABG x 1 (SVG-RCA). Pt found to have endo leak and taken to OR for TEVAR revision on 5/5. Post op course c/b Klebsiella bacteremia (5/15), resp failure requiring intubation on 5/18, Mucus plugging s/p Bronch 5/19 and PEA arrest. Post operatively pt also found to have hemorrhagic pancreatitis with venu-pancreatic abscess possibly 2* to Depakote therefore s/p IR aspiration of peripancreatic fluid collection and paracentesis on 5/22, IR venu-pancreatic abscess drainage and placement of drainage catheter on 5/24. Pt then transferred from CTICU to SICU for septic shock, and further mgmt of hemorrhagic pancreatitis on 5/25. s/p IR placement of 2 new drainage catheters and catheter exchange on 5/26. LUQ drainage 5/30. OR 5/31 for exlap washout & replacement of 3 new JPs and abthera vac with open abdomen. RTOR 6/1, no bleeding, evac of old blood, placement of feeding J-tube, failed extubation due to PNA, completed ABX course and s/p trach 6/5 and decannulation 6/28. stepped down, but returned to SICU (7/2) for respiratory distress. Emergently intubated (7/3) for aspiration pneumonia, sputum cx grew pseudomonas. Extubated on 7/9.    Chart reviewed, continues NPO on Vital 1.0 feeds. Extubated 7/9, now on room air. Ongoing RD recommendations to transition to Vital 1.5 EN. Bowel regimen in place, stooling improving. RDN will continue to monitor, reassess, and intervene as appropriate.     Previous Nutrition Diagnosis: Increased nutrients RT increased demands AEB clinical course, Pressure ulcers    Active [ X  ]  Resolved [   ]    Goal:  Pt will meet at least 75% of protein & energy needs via most appropriate route for nutrition     Recommendations:  1. Recommend formula change to Vital 1.5 @50ml/hr x 24hrs; provides 1200ml total volume, 1800 kcals, 81g protein, 916.8ml free water daily  - recommend additional 1 LPS BID; provides 200kcals, 30g protein  - consider addition of banatrol prn  2. Diet as medically feasible  3. Monitor lytes, replete prn  4. Monitor chemistry, skin integrity, GI function  5. Align nutrition with GOC at all times    Education: deferred     Risk Level: High [ X ] Moderate [   ] Low [   ]. Admitting Diagnosis:   Patient is a 53y old  Male who presents with a chief complaint of endoleak, abdominal pain (05 Jul 2023 11:00)      PAST MEDICAL & SURGICAL HISTORY:  HTN (hypertension)      Aortic dissection      CAD (coronary artery disease)      Seizure disorder      Seizure disorder      Hypertension      Status post endovascular aneurysm repair (EVAR)      S/P aortic bifurcation bypass graft      S/P CABG x 1          Current Nutrition Order: NPO; Vital 1.0 @70ml/hr x 24hrs; provides 1680ml total volume, 1680 kcals, 67.2g protein, 1401ml free water daily + 2 LPS daily [provides 200 kcals, 30g protein]    PO Intake: Good (%) [   ]  Fair (50-75%) [   ] Poor (<25%) [   ]- N/A    GI Issues: nontender/nondistended, loose BM/diarrhea noted     Pain: No pain/absence of nonverbal indicators of pain    Skin Integrity: stage II PUs to sacrum & L scapula, penis, skin tear to L ear, 2+ edema R arm, 3+ edema L/R foot    Labs:   07-12    138  |  108  |  16  ----------------------------<  99  4.4   |  25  |  0.46<L>    Ca    8.1<L>      12 Jul 2023 05:30  Phos  3.6     07-12  Mg     2.0     07-12    CAPILLARY BLOOD GLUCOSE      POCT Blood Glucose.: 95 mg/dL (12 Jul 2023 11:49)      Medications:  MEDICATIONS  (STANDING):  acetaminophen   Oral Liquid .. 975 milliGRAM(s) Oral every 6 hours  albuterol/ipratropium for Nebulization 3 milliLiter(s) Nebulizer every 6 hours  artificial  tears Solution 1 Drop(s) Both EYES two times a day  aspirin  chewable 81 milliGRAM(s) Oral daily  calamine/zinc oxide Lotion 1 Application(s) Topical two times a day  chlorhexidine 2% Cloths 1 Application(s) Topical daily  dextrose 5%. 1000 milliLiter(s) (100 mL/Hr) IV Continuous <Continuous>  dextrose 50% Injectable 25 Gram(s) IV Push once  glucagon  Injectable 1 milliGRAM(s) IntraMuscular once  glycopyrrolate Injectable 0.1 milliGRAM(s) IV Push every 6 hours  heparin   Injectable 5000 Unit(s) SubCutaneous every 8 hours  insulin lispro (ADMELOG) corrective regimen sliding scale   SubCutaneous every 6 hours  lacosamide Solution 200 milliGRAM(s) Oral two times a day  loperamide Liquid 2 milliGRAM(s) Enteral Tube every 6 hours  metoprolol tartrate 50 milliGRAM(s) Oral every 12 hours  multivitamin/minerals/iron Oral Solution (CENTRUM) 15 milliLiter(s) Oral daily  nystatin Powder 1 Application(s) Topical every 8 hours  pantoprazole  Injectable 40 milliGRAM(s) IV Push daily  sodium chloride 3%  Inhalation 4 milliLiter(s) Inhalation every 6 hours    MEDICATIONS  (PRN):  dextrose Oral Gel 15 Gram(s) Oral once PRN Blood Glucose LESS THAN 70 milliGRAM(s)/deciliter  melatonin Liquid 5 milliGRAM(s) Oral at bedtime PRN Sleep  ondansetron Injectable 4 milliGRAM(s) IV Push every 8 hours PRN Nausea and/or Vomiting  oxyCODONE    Solution 5 milliGRAM(s) Oral every 6 hours PRN breakthrough pain  sodium chloride 0.9% lock flush 10 milliLiter(s) IV Push every 1 hour PRN Pre/post blood products, medications, blood draw, and to maintain line patency        Weight: 91kg [10 Ghazala 2023]  Daily   90.9kg [7 June 2023]  Daily 83.3kg [4 July 2023]    Weight Change: 7.7kg/8.4% wt loss x 1 month- ? 2/2 fluid shifts/inadequate nutrition. Need reweight to confirm. Please obtain weekly weights to ensure adequacy of nutrition provision    Estimated energy needs:   Ideal body weight (80.9kg) used for calculations as pt >100% of IBW, BMI <30 per [facility] Standards of Care. Needs estimated for age and adjusted for current clinical status     Calories: 2022.5-2427 kcals based on 25-30 kcals/kg  Protein: 113.3-129.4g protein based on 1.4-1.6g protein/kg  *Fluid Needs per team    Subjective:   53M w PMHx of HTN, type A aortic dissection s/p Dacron grafts, AV resuspension in 2013, CAD s/p CABG x 1 SVG to RCA (5/2013 with Dr. Medina), seizure disorder, recent admission 3/20-4/14 for replacement of transverse aortic arch, second stage TEVAR and aorto-axillary bypass, AV replacement (bio 23mm), and CABG x 1 (SVG-RCA). Pt found to have endo leak and taken to OR for TEVAR revision on 5/5. Post op course c/b Klebsiella bacteremia (5/15), resp failure requiring intubation on 5/18, Mucus plugging s/p Bronch 5/19 and PEA arrest. Post operatively pt also found to have hemorrhagic pancreatitis with venu-pancreatic abscess possibly 2* to Depakote therefore s/p IR aspiration of peripancreatic fluid collection and paracentesis on 5/22, IR venu-pancreatic abscess drainage and placement of drainage catheter on 5/24. Pt then transferred from CTICU to SICU for septic shock, and further mgmt of hemorrhagic pancreatitis on 5/25. s/p IR placement of 2 new drainage catheters and catheter exchange on 5/26. LUQ drainage 5/30. OR 5/31 for exlap washout & replacement of 3 new JPs and abthera vac with open abdomen. RTOR 6/1, no bleeding, evac of old blood, placement of feeding J-tube, failed extubation due to PNA, completed ABX course and s/p trach 6/5 and decannulation 6/28. stepped down, but returned to SICU (7/2) for respiratory distress. Emergently intubated (7/3) for aspiration pneumonia, sputum cx grew pseudomonas. Extubated on 7/9.    Chart reviewed, continues NPO on Vital 1.0 feeds. Extubated 7/9, now on room air. Ongoing RD recommendations to transition to Vital 1.5 EN. Bowel regimen in place, stooling improving. RDN will continue to monitor, reassess, and intervene as appropriate.     Previous Nutrition Diagnosis: Increased nutrients RT increased demands AEB clinical course, Pressure ulcers    Active [ X  ]  Resolved [   ]    Goal:  Pt will meet at least 75% of protein & energy needs via most appropriate route for nutrition     Recommendations:  1. Recommend formula change to Vital 1.5 @50ml/hr x 24hrs; provides 1200ml total volume, 1800 kcals, 81g protein, 916.8ml free water daily  - recommend additional 1 LPS BID; provides 200kcals, 30g protein  - consider addition of banatrol prn  2. Diet as medically feasible, consistency per SLP  3. Monitor lytes, replete prn  4. Monitor chemistry, skin integrity, GI function  5. Align nutrition with GOC at all times    Education: deferred     Risk Level: High [ X ] Moderate [   ] Low [   ].

## 2023-07-12 NOTE — SWALLOW FEES ASSESSMENT ADULT - PRELIMINARY ENDOSCOPIC EXAMINATIONS
Baseline pooling of secretions/Baseline penetration of secretions
Interarytenoid/post-commissure edema/Interarytenoid/Arytenoid erythema/Baseline pooling of secretions

## 2023-07-12 NOTE — SWALLOW FEES ASSESSMENT ADULT - DIAGNOSTIC IMPRESSIONS
Mild roque-pharyngeal dysphagia impacting swallow safety in setting of prolonged hospitalization and deconditioning as well as tracheostomy. Reduced supraglottic closure resulted in deep laryngeal penetration with cup sips of thin and mildly thick liquids. Volume control was effective in eliminating penetration.
Pt presented with mild pharyngeal dysphagia. (Deep) laryngeal penetration of thin liquids was volume dependent and only occurred with straw administration. Reduced pharyngeal swallow efficiency resulted in bolus stasis which slightly increased with denser boli. Residue ultimately led to airway protection deficits with shallow penetration of solid residue and deep penetration/suspected aspiration of mildly thick liquids after the swallow as a result of increased residue.     Although no ashley aspiration was observed with small controlled cup sips of thin liquid, pt is at an increased risk for aspiration and its negative sequela given fluctuating PATTI, reduced mobility, critical medical status, and dependence on others for feeding assistance. Consequently, a modified diet with strict aspiration precautions is advised.     Suspect good prognosis over the next week/few weeks with use of swallowing mechanism and overall medical recovery and increased mobility. However, pt may benefit from short-term exercise based swallow rehabilitation to improve pharyngeal efficiency.

## 2023-07-12 NOTE — PROGRESS NOTE ADULT - SUBJECTIVE AND OBJECTIVE BOX
Interval Events: No acute events overnight     Subjective Events: Patient seen and examined         Allergies    No Known Allergies    Intolerances        Vital Signs Last 24 Hrs  T(C): 36.9 (11 Jul 2023 21:55), Max: 37.2 (11 Jul 2023 17:55)  T(F): 98.5 (11 Jul 2023 21:55), Max: 99 (11 Jul 2023 17:55)  HR: 103 (12 Jul 2023 03:00) (98 - 121)  BP: 145/95 (12 Jul 2023 03:00) (141/88 - 173/104)  BP(mean): 116 (12 Jul 2023 03:00) (109 - 132)  RR: 16 (12 Jul 2023 03:00) (11 - 20)  SpO2: 100% (12 Jul 2023 03:00) (96% - 100%)    Parameters below as of 12 Jul 2023 03:00  Patient On (Oxygen Delivery Method): room air        07-10 @ 07:01  -  07-11 @ 07:00  --------------------------------------------------------  IN: 2240 mL / OUT: 900 mL / NET: 1340 mL    07-11 @ 07:01  -  07-12 @ 06:45  --------------------------------------------------------  IN: 1370 mL / OUT: 625 mL / NET: 745 mL      07-10 @ 07:01  -  07-11 @ 07:00  --------------------------------------------------------  IN: 2240 mL / OUT: 900 mL / NET: 1340 mL    07-11 @ 07:01  -  07-12 @ 06:45  --------------------------------------------------------  IN: 1370 mL / OUT: 625 mL / NET: 745 mL        Physical Exam:     Gen: NAD well nourished  Neuro: A&OX3 No deficits  CV:RRR Reg s1s2 noM  Pulm: CTA b/l No w/r/r  Abd: Soft NT ND + BS  Ext: No C/C/E   Vasc: + DP b/l   Skin: no rashes noted  MSK: No joint swelling  Psych: No signs of anxiety or depression      LABS:      CBC Full  -  ( 11 Jul 2023 04:48 )  WBC Count : 13.68 K/uL  RBC Count : 2.84 M/uL  Hemoglobin : 8.2 g/dL  Hematocrit : 26.0 %  Platelet Count - Automated : 465 K/uL  Mean Cell Volume : 91.5 fl  Mean Cell Hemoglobin : 28.9 pg  Mean Cell Hemoglobin Concentration : 31.5 gm/dL  Auto Neutrophil # : x  Auto Lymphocyte # : x  Auto Monocyte # : x  Auto Eosinophil # : x  Auto Basophil # : x  Auto Neutrophil % : x  Auto Lymphocyte % : x  Auto Monocyte % : x  Auto Eosinophil % : x  Auto Basophil % : x    07-11    140  |  108  |  17  ----------------------------<  97  4.6   |  24  |  0.46<L>    Ca    8.4      11 Jul 2023 04:48  Phos  3.1     07-11  Mg     2.0     07-11            Urinalysis Basic - ( 11 Jul 2023 04:48 )    Color: x / Appearance: x / SG: x / pH: x  Gluc: 97 mg/dL / Ketone: x  / Bili: x / Urobili: x   Blood: x / Protein: x / Nitrite: x   Leuk Esterase: x / RBC: x / WBC x   Sq Epi: x / Non Sq Epi: x / Bacteria: x              RADIOLOGY & ADDITIONAL STUDIES (The following images were personally reviewed):          A/p: 53yMale Interval Events: No acute events overnight     Subjective Events: Patient seen and examined. No acute distress. ROS negative.        Allergies    No Known Allergies    Intolerances        Vital Signs Last 24 Hrs  T(C): 36.9 (11 Jul 2023 21:55), Max: 37.2 (11 Jul 2023 17:55)  T(F): 98.5 (11 Jul 2023 21:55), Max: 99 (11 Jul 2023 17:55)  HR: 103 (12 Jul 2023 03:00) (98 - 121)  BP: 145/95 (12 Jul 2023 03:00) (141/88 - 173/104)  BP(mean): 116 (12 Jul 2023 03:00) (109 - 132)  RR: 16 (12 Jul 2023 03:00) (11 - 20)  SpO2: 100% (12 Jul 2023 03:00) (96% - 100%)    Parameters below as of 12 Jul 2023 03:00  Patient On (Oxygen Delivery Method): room air        07-10 @ 07:01  -  07-11 @ 07:00  --------------------------------------------------------  IN: 2240 mL / OUT: 900 mL / NET: 1340 mL    07-11 @ 07:01  -  07-12 @ 06:45  --------------------------------------------------------  IN: 1370 mL / OUT: 625 mL / NET: 745 mL      07-10 @ 07:01  -  07-11 @ 07:00  --------------------------------------------------------  IN: 2240 mL / OUT: 900 mL / NET: 1340 mL    07-11 @ 07:01  -  07-12 @ 06:45  --------------------------------------------------------  IN: 1370 mL / OUT: 625 mL / NET: 745 mL        Physical Exam:     Gen: No acute distress   Neuro: A&OX3 No deficits  CV: Tachycardic, regular rhythm, S3 murmur, +1 lower extremity peripheral edema  Pulm: Lung sounds clear bilaterally   Abd: Soft NT ND + BS. J tube present   Ext: warm, well-perfused    Vasc: +2 radial pulses bilaterally, +1 pedal pulses bilaterally    Skin: Midline incision healing; no rashes noted  MSK: No joint swelling  Psych: No signs of anxiety or depression      LABS:      CBC Full  -  ( 11 Jul 2023 04:48 )  WBC Count : 13.68 K/uL  RBC Count : 2.84 M/uL  Hemoglobin : 8.2 g/dL  Hematocrit : 26.0 %  Platelet Count - Automated : 465 K/uL  Mean Cell Volume : 91.5 fl  Mean Cell Hemoglobin : 28.9 pg  Mean Cell Hemoglobin Concentration : 31.5 gm/dL      07-11    140  |  108  |  17  ----------------------------<  97  4.6   |  24  |  0.46<L>    Ca    8.4      11 Jul 2023 04:48  Phos  3.1     07-11  Mg     2.0     07-11            Urinalysis Basic - ( 11 Jul 2023 04:48 )    Color: x / Appearance: x / SG: x / pH: x  Gluc: 97 mg/dL / Ketone: x  / Bili: x / Urobili: x   Blood: x / Protein: x / Nitrite: x   Leuk Esterase: x / RBC: x / WBC x   Sq Epi: x / Non Sq Epi: x / Bacteria: x              RADIOLOGY & ADDITIONAL STUDIES (The following images were personally reviewed):    ACC: [id number] EXAM:  XR CHEST PORTABLE ROUTINE 1V   ORDERED BY: JASMIN BERMUDEZ     PROCEDURE DATE:  07/12/2023          INTERPRETATION:  Portable chest    HISTORY: Intubated follow-up abnormal exam    IMPRESSION:    Endotracheal tube is not visualized on this exam.    Postop changes. Pleural effusions right more so than left similar to   prior exam 7/11/2023. No definite acute infiltrates. No pneumothorax.

## 2023-07-12 NOTE — PROGRESS NOTE ADULT - SUBJECTIVE AND OBJECTIVE BOX
SUBJECTIVE: Patient seen at bed side during morning rounds. Patient seen by speech and swallow and cleared for thick liquids.     MEDICATIONS  (STANDING):  acetaminophen   Oral Liquid .. 975 milliGRAM(s) Oral every 6 hours  albuterol/ipratropium for Nebulization 3 milliLiter(s) Nebulizer every 6 hours  artificial  tears Solution 1 Drop(s) Both EYES two times a day  aspirin  chewable 81 milliGRAM(s) Oral daily  calamine/zinc oxide Lotion 1 Application(s) Topical two times a day  chlorhexidine 2% Cloths 1 Application(s) Topical daily  dextrose 5%. 1000 milliLiter(s) (100 mL/Hr) IV Continuous <Continuous>  dextrose 50% Injectable 25 Gram(s) IV Push once  glucagon  Injectable 1 milliGRAM(s) IntraMuscular once  glycopyrrolate Injectable 0.1 milliGRAM(s) IV Push every 6 hours  heparin   Injectable 5000 Unit(s) SubCutaneous every 8 hours  insulin lispro (ADMELOG) corrective regimen sliding scale   SubCutaneous every 6 hours  lacosamide Solution 200 milliGRAM(s) Oral two times a day  loperamide Liquid 2 milliGRAM(s) Enteral Tube every 6 hours  metoprolol tartrate 50 milliGRAM(s) Oral every 12 hours  multivitamin/minerals/iron Oral Solution (CENTRUM) 15 milliLiter(s) Oral daily  nystatin Powder 1 Application(s) Topical every 8 hours  pantoprazole  Injectable 40 milliGRAM(s) IV Push daily  sodium chloride 3%  Inhalation 4 milliLiter(s) Inhalation every 6 hours    MEDICATIONS  (PRN):  dextrose Oral Gel 15 Gram(s) Oral once PRN Blood Glucose LESS THAN 70 milliGRAM(s)/deciliter  melatonin Liquid 5 milliGRAM(s) Oral at bedtime PRN Sleep  ondansetron Injectable 4 milliGRAM(s) IV Push every 8 hours PRN Nausea and/or Vomiting  oxyCODONE    Solution 5 milliGRAM(s) Oral every 6 hours PRN breakthrough pain  sodium chloride 0.9% lock flush 10 milliLiter(s) IV Push every 1 hour PRN Pre/post blood products, medications, blood draw, and to maintain line patency      Drips:     ICU Vital Signs Last 24 Hrs  T(C): 36.8 (12 Jul 2023 17:18), Max: 37.2 (11 Jul 2023 17:55)  T(F): 98.3 (12 Jul 2023 17:18), Max: 99 (11 Jul 2023 17:55)  HR: 102 (12 Jul 2023 17:00) (95 - 117)  BP: 152/102 (12 Jul 2023 17:00) (141/88 - 163/102)  BP(mean): 122 (12 Jul 2023 17:00) (109 - 127)  ABP: --  ABP(mean): --  RR: 13 (12 Jul 2023 17:00) (12 - 20)  SpO2: 98% (12 Jul 2023 17:00) (95% - 100%)    O2 Parameters below as of 12 Jul 2023 17:00  Patient On (Oxygen Delivery Method): room air      Physical Exam:  Neurological: alert   ENT: mucus membrane moist  Cardiovascular: RRR + murmur  Respiratory: CTA  Gastrointestinal: soft, NT, ND, BS+, midline incision healing, no erythema, no bleeding, no pus.   Extremities: warm, 2+ dependent edema  Vascular: no cyanosis/erythema  Skin: no rashes  MSK: no joint swelling.   Psych: No signs of anxiety or depression        Lines/tubes/drains:  [place]:	      Vent settings:      I&O's Summary    11 Jul 2023 07:01  -  12 Jul 2023 07:00  --------------------------------------------------------  IN: 1680 mL / OUT: 925 mL / NET: 755 mL    12 Jul 2023 07:01  -  12 Jul 2023 17:41  --------------------------------------------------------  IN: 540 mL / OUT: 250 mL / NET: 290 mL        LABS:                        7.9    12.98 )-----------( 484      ( 12 Jul 2023 05:30 )             24.8     07-12    138  |  108  |  16  ----------------------------<  99  4.4   |  25  |  0.46<L>    Ca    8.1<L>      12 Jul 2023 05:30  Phos  3.6     07-12  Mg     2.0     07-12        Urinalysis Basic - ( 12 Jul 2023 05:30 )    Color: x / Appearance: x / SG: x / pH: x  Gluc: 99 mg/dL / Ketone: x  / Bili: x / Urobili: x   Blood: x / Protein: x / Nitrite: x   Leuk Esterase: x / RBC: x / WBC x   Sq Epi: x / Non Sq Epi: x / Bacteria: x      CAPILLARY BLOOD GLUCOSE      POCT Blood Glucose.: 95 mg/dL (12 Jul 2023 11:49)  POCT Blood Glucose.: 101 mg/dL (12 Jul 2023 06:48)  POCT Blood Glucose.: 100 mg/dL (12 Jul 2023 00:19)  POCT Blood Glucose.: 107 mg/dL (11 Jul 2023 18:37)        Cultures:    RADIOLOGY & ADDITIONAL STUDIES:

## 2023-07-12 NOTE — SWALLOW FEES ASSESSMENT ADULT - SPECIFY REASON(S)
Determine candidacy for PO
FEES recommended prior to initiation of PO diet given pt's heightened risk for aspiration and its negative sequela i/s/o complicated medical course

## 2023-07-13 LAB
ANION GAP SERPL CALC-SCNC: 9 MMOL/L — SIGNIFICANT CHANGE UP (ref 5–17)
BUN SERPL-MCNC: 15 MG/DL — SIGNIFICANT CHANGE UP (ref 7–23)
CALCIUM SERPL-MCNC: 8.2 MG/DL — LOW (ref 8.4–10.5)
CHLORIDE SERPL-SCNC: 108 MMOL/L — SIGNIFICANT CHANGE UP (ref 96–108)
CO2 SERPL-SCNC: 23 MMOL/L — SIGNIFICANT CHANGE UP (ref 22–31)
CREAT SERPL-MCNC: 0.48 MG/DL — LOW (ref 0.5–1.3)
EGFR: 123 ML/MIN/1.73M2 — SIGNIFICANT CHANGE UP
GLUCOSE BLDC GLUCOMTR-MCNC: 106 MG/DL — HIGH (ref 70–99)
GLUCOSE BLDC GLUCOMTR-MCNC: 78 MG/DL — SIGNIFICANT CHANGE UP (ref 70–99)
GLUCOSE BLDC GLUCOMTR-MCNC: 85 MG/DL — SIGNIFICANT CHANGE UP (ref 70–99)
GLUCOSE SERPL-MCNC: 81 MG/DL — SIGNIFICANT CHANGE UP (ref 70–99)
HCT VFR BLD CALC: 23.9 % — LOW (ref 39–50)
HGB BLD-MCNC: 7.6 G/DL — LOW (ref 13–17)
MAGNESIUM SERPL-MCNC: 2.1 MG/DL — SIGNIFICANT CHANGE UP (ref 1.6–2.6)
MCHC RBC-ENTMCNC: 29.3 PG — SIGNIFICANT CHANGE UP (ref 27–34)
MCHC RBC-ENTMCNC: 31.8 GM/DL — LOW (ref 32–36)
MCV RBC AUTO: 92.3 FL — SIGNIFICANT CHANGE UP (ref 80–100)
NRBC # BLD: 0 /100 WBCS — SIGNIFICANT CHANGE UP (ref 0–0)
PHOSPHATE SERPL-MCNC: 3.9 MG/DL — SIGNIFICANT CHANGE UP (ref 2.5–4.5)
PLATELET # BLD AUTO: 513 K/UL — HIGH (ref 150–400)
POTASSIUM SERPL-MCNC: 4.3 MMOL/L — SIGNIFICANT CHANGE UP (ref 3.5–5.3)
POTASSIUM SERPL-SCNC: 4.3 MMOL/L — SIGNIFICANT CHANGE UP (ref 3.5–5.3)
RBC # BLD: 2.59 M/UL — LOW (ref 4.2–5.8)
RBC # FLD: 18 % — HIGH (ref 10.3–14.5)
SODIUM SERPL-SCNC: 140 MMOL/L — SIGNIFICANT CHANGE UP (ref 135–145)
WBC # BLD: 12.7 K/UL — HIGH (ref 3.8–10.5)
WBC # FLD AUTO: 12.7 K/UL — HIGH (ref 3.8–10.5)

## 2023-07-13 PROCEDURE — 99233 SBSQ HOSP IP/OBS HIGH 50: CPT | Mod: GC

## 2023-07-13 RX ORDER — GLUCAGON INJECTION, SOLUTION 0.5 MG/.1ML
1 INJECTION, SOLUTION SUBCUTANEOUS ONCE
Refills: 0 | Status: DISCONTINUED | OUTPATIENT
Start: 2023-07-13 | End: 2023-07-19

## 2023-07-13 RX ORDER — PANTOPRAZOLE SODIUM 20 MG/1
40 TABLET, DELAYED RELEASE ORAL
Refills: 0 | Status: DISCONTINUED | OUTPATIENT
Start: 2023-07-14 | End: 2023-07-17

## 2023-07-13 RX ORDER — DEXTROSE 50 % IN WATER 50 %
15 SYRINGE (ML) INTRAVENOUS ONCE
Refills: 0 | Status: DISCONTINUED | OUTPATIENT
Start: 2023-07-13 | End: 2023-07-19

## 2023-07-13 RX ORDER — OXYCODONE HYDROCHLORIDE 5 MG/1
5 TABLET ORAL EVERY 6 HOURS
Refills: 0 | Status: DISCONTINUED | OUTPATIENT
Start: 2023-07-13 | End: 2023-07-17

## 2023-07-13 RX ORDER — DEXTROSE 50 % IN WATER 50 %
12.5 SYRINGE (ML) INTRAVENOUS ONCE
Refills: 0 | Status: DISCONTINUED | OUTPATIENT
Start: 2023-07-13 | End: 2023-07-19

## 2023-07-13 RX ORDER — DEXTROSE 50 % IN WATER 50 %
25 SYRINGE (ML) INTRAVENOUS ONCE
Refills: 0 | Status: DISCONTINUED | OUTPATIENT
Start: 2023-07-13 | End: 2023-07-19

## 2023-07-13 RX ADMIN — OXYCODONE HYDROCHLORIDE 5 MILLIGRAM(S): 5 TABLET ORAL at 23:50

## 2023-07-13 RX ADMIN — Medication 2 MILLIGRAM(S): at 05:23

## 2023-07-13 RX ADMIN — Medication 975 MILLIGRAM(S): at 00:20

## 2023-07-13 RX ADMIN — ROBINUL 0.1 MILLIGRAM(S): 0.2 INJECTION INTRAMUSCULAR; INTRAVENOUS at 05:20

## 2023-07-13 RX ADMIN — PANTOPRAZOLE SODIUM 40 MILLIGRAM(S): 20 TABLET, DELAYED RELEASE ORAL at 11:34

## 2023-07-13 RX ADMIN — Medication 975 MILLIGRAM(S): at 05:51

## 2023-07-13 RX ADMIN — Medication 3 MILLILITER(S): at 16:55

## 2023-07-13 RX ADMIN — HEPARIN SODIUM 5000 UNIT(S): 5000 INJECTION INTRAVENOUS; SUBCUTANEOUS at 05:21

## 2023-07-13 RX ADMIN — CALAMINE AND ZINC OXIDE AND PHENOL 1 APPLICATION(S): 160; 10 LOTION TOPICAL at 05:25

## 2023-07-13 RX ADMIN — Medication 975 MILLIGRAM(S): at 17:29

## 2023-07-13 RX ADMIN — Medication 3 MILLILITER(S): at 11:32

## 2023-07-13 RX ADMIN — CALAMINE AND ZINC OXIDE AND PHENOL 1 APPLICATION(S): 160; 10 LOTION TOPICAL at 22:33

## 2023-07-13 RX ADMIN — Medication 3 MILLILITER(S): at 05:22

## 2023-07-13 RX ADMIN — Medication 2 MILLIGRAM(S): at 11:37

## 2023-07-13 RX ADMIN — SODIUM CHLORIDE 4 MILLILITER(S): 9 INJECTION INTRAMUSCULAR; INTRAVENOUS; SUBCUTANEOUS at 11:33

## 2023-07-13 RX ADMIN — HEPARIN SODIUM 5000 UNIT(S): 5000 INJECTION INTRAVENOUS; SUBCUTANEOUS at 22:33

## 2023-07-13 RX ADMIN — NYSTATIN CREAM 1 APPLICATION(S): 100000 CREAM TOPICAL at 22:33

## 2023-07-13 RX ADMIN — Medication 3 MILLILITER(S): at 22:32

## 2023-07-13 RX ADMIN — NYSTATIN CREAM 1 APPLICATION(S): 100000 CREAM TOPICAL at 05:25

## 2023-07-13 RX ADMIN — Medication 2 MILLIGRAM(S): at 00:15

## 2023-07-13 RX ADMIN — Medication 15 MILLILITER(S): at 16:55

## 2023-07-13 RX ADMIN — HEPARIN SODIUM 5000 UNIT(S): 5000 INJECTION INTRAVENOUS; SUBCUTANEOUS at 15:46

## 2023-07-13 RX ADMIN — Medication 81 MILLIGRAM(S): at 11:34

## 2023-07-13 RX ADMIN — CHLORHEXIDINE GLUCONATE 1 APPLICATION(S): 213 SOLUTION TOPICAL at 05:25

## 2023-07-13 RX ADMIN — Medication 50 MILLIGRAM(S): at 05:24

## 2023-07-13 RX ADMIN — Medication 975 MILLIGRAM(S): at 05:21

## 2023-07-13 RX ADMIN — SODIUM CHLORIDE 4 MILLILITER(S): 9 INJECTION INTRAMUSCULAR; INTRAVENOUS; SUBCUTANEOUS at 05:22

## 2023-07-13 RX ADMIN — Medication 975 MILLIGRAM(S): at 23:54

## 2023-07-13 RX ADMIN — OXYCODONE HYDROCHLORIDE 5 MILLIGRAM(S): 5 TABLET ORAL at 16:31

## 2023-07-13 RX ADMIN — Medication 50 MILLIGRAM(S): at 17:32

## 2023-07-13 RX ADMIN — Medication 2 MILLIGRAM(S): at 23:54

## 2023-07-13 RX ADMIN — LACOSAMIDE 200 MILLIGRAM(S): 50 TABLET ORAL at 17:29

## 2023-07-13 RX ADMIN — Medication 975 MILLIGRAM(S): at 17:59

## 2023-07-13 RX ADMIN — NYSTATIN CREAM 1 APPLICATION(S): 100000 CREAM TOPICAL at 13:08

## 2023-07-13 RX ADMIN — LACOSAMIDE 200 MILLIGRAM(S): 50 TABLET ORAL at 05:22

## 2023-07-13 RX ADMIN — Medication 1 DROP(S): at 05:25

## 2023-07-13 RX ADMIN — Medication 2 MILLIGRAM(S): at 17:32

## 2023-07-13 RX ADMIN — OXYCODONE HYDROCHLORIDE 5 MILLIGRAM(S): 5 TABLET ORAL at 17:26

## 2023-07-13 RX ADMIN — Medication 1 DROP(S): at 17:30

## 2023-07-13 RX ADMIN — Medication 975 MILLIGRAM(S): at 11:33

## 2023-07-13 RX ADMIN — Medication 975 MILLIGRAM(S): at 12:19

## 2023-07-13 RX ADMIN — OXYCODONE HYDROCHLORIDE 5 MILLIGRAM(S): 5 TABLET ORAL at 22:43

## 2023-07-13 NOTE — PROGRESS NOTE ADULT - ASSESSMENT
A/p: 53M w PMHx of HTN, type A aortic dissection s/p Dacron grafts, AV resuspension in 2013, CAD s/p CABG x 1 SVG to RCA (5/2013 with Dr. Medina), seizure disorder, recent admission 3/20-4/14 for replacement of transverse aortic arch, second stage TEVAR and aorto-axillary bypass, AV replacement (bio 23mm), and CABG x 1 (SVG-RCA). Pt found to have endo leak and taken to OR for TEVAR revision on 5/5. Post op course c/b Klebsiella bacteremia (5/15), resp failure requiring intubation on 5/18, Mucus plugging s/p Bronch 5/19 and PEA arrest. Post operatively pt also found to have hemorrhagic pancreatitis with venu-pancreatic abscess possibly 2* to Depakote therefore s/p IR aspiration of peripancreatic fluid collection and paracentesis on 5/22, IR venu-pancreatic abscess drainage and placement of drainage catheter on 5/24. Pt then transferred from CTICU to SICU for septic shock, and further mgmt of hemorrhagic pancreatitis on 5/25. s/p IR placement of 2 new drainage catheters and catheter exchange on 5/26. LUQ drainage 5/30. OR 5/31 for exlap washout & replacement of 3 new JPs and abthera vac with open abdomen. RTOR 6/1, no bleeding, evac of old blood, placement of feeding J-tube, failed extubation due to PNA, completed ABX course and s/p trach 6/5 and decannulation 6/28. stepped down, but returned to SICU (7/2) for respiratory distress. Emergently intubated (7/3) for aspiration pneumonia, sputum cx grew pseudomonas. Extubated on 7/9.      NEURO: Standing oxycodone d/c'ed 2/2 drowsiness. Prn Oxycodone. Hx seizures: cont vimpat. Avoid depakote due to drug-related pancreatitis risk.   HEENT: Artificial tears, Torticollis improved  CV: s/p PEA arrest on 5/24 night. TTE (6/14) LVEF 75%, LVH, biatrial enlargement and elevated PA pressure (elevated from previous), mild to mod MR/TR . Cont ASA 81mg. Increase metoprolol today 50 BID.    PULM: Aspiration PNA: S/p Reintubation on 7/3 and extubation on 7/9 - Cont Duonebs, 3% saline nebs, Scopalamine d/c;ed, continue glycopyrrolate q6h for secretions. Hx of recent ARDS/PNA resolved and trach decannulated 6/28.   GI/FEN: Speech and Swallow. FEES performed. Recommending minced and moist with thin liquids. No signs of aspiration. TF Vital 1.0 at 70cc via feeding J-tube with LPS x3, diarrhea on imodium, MTV, Protonix. Hemorrhagic pancreatitis: s/p multiple drain placements and paracentisis by IR team. Cdiff negative (6/19).  : acute renal failure- been off CVVHD since mid June with renal recovery. Voids.  HEME: Acute blood loss anemia: hg stable after 1 unit PRBC 7/10.   ENDO: mISS  ID: Sepsis without shock, Pseudomonas in Sputum: on Meropenem( 7/7-7/11) as per Surgical team request.     - PRIOR ABX: Ertapenem (6/13-6/16, 6/18-6/23), meropenem (5/21-6/5, 6/9-6/13, 7/2-7/5), vanco (6/9, 6/18-6/22, 7/2-7/5), caspofungin (5/23-5/30, 6/9-6/12, 6/18-6/21), Ceftriaxone( 6/5- 6/15), Ampicillin (5/21- 5/27). Zosyn: ( 7/- 7/7)   - Prior Cultures: Kleb bacteremia from 5/15 & 5/17. bronch cx kleb, enterobacter (zosyn, erta resistant), E faecalis, strep angionosus from 5/19, pancreatic abscess cx pan-sensitive kleb 5/22.   PPX: SCDs, SQH  LINES: PIVs // L rad kalpana (5/31-6/15), 5Fr PIV in LUE (6/13-15), Lsubclav HD Cath (5/31-6/12), RIJ TLC (5/22-5/27), RIJ HD cath (5/22-31), R Ax kalpana(5/12-5/31), LIJ TLC (5/23-6/1), RIJ TLC (6/1-13)   WOUNDS/DRAINS: CODIE removed. midline incision healed.   PT: PT/OT consult requested for early mobilization.   Dispo: SICU  A/p: 53M w PMHx of HTN, type A aortic dissection s/p Dacron grafts, AV resuspension in 2013, CAD s/p CABG x 1 SVG to RCA (5/2013 with Dr. Medina), seizure disorder, recent admission 3/20-4/14 for replacement of transverse aortic arch, second stage TEVAR and aorto-axillary bypass, AV replacement (bio 23mm), and CABG x 1 (SVG-RCA). Pt found to have endo leak and taken to OR for TEVAR revision on 5/5. Post op course c/b Klebsiella bacteremia (5/15), resp failure requiring intubation on 5/18, Mucus plugging s/p Bronch 5/19 and PEA arrest. Post operatively pt also found to have hemorrhagic pancreatitis with venu-pancreatic abscess possibly 2* to Depakote therefore s/p IR aspiration of peripancreatic fluid collection and paracentesis on 5/22, IR venu-pancreatic abscess drainage and placement of drainage catheter on 5/24. Pt then transferred from CTICU to SICU for septic shock, and further mgmt of hemorrhagic pancreatitis on 5/25. s/p IR placement of 2 new drainage catheters and catheter exchange on 5/26. LUQ drainage 5/30. OR 5/31 for exlap washout & replacement of 3 new JPs and abthera vac with open abdomen. RTOR 6/1, no bleeding, evac of old blood, placement of feeding J-tube, failed extubation due to PNA, completed ABX course and s/p trach 6/5 and decannulation 6/28. stepped down, but returned to SICU (7/2) for respiratory distress. Emergently intubated (7/3) for aspiration pneumonia, sputum cx grew pseudomonas. Extubated on 7/9.      NEURO: Standing oxycodone d/c'ed 2/2 drowsiness. Prn Oxycodone. Hx seizures: cont vimpat. Avoid depakote due to drug-related pancreatitis risk.   HEENT: Artificial tears, Torticollis improved  CV: s/p PEA arrest on 5/24 night. TTE (6/14) LVEF 75%, LVH, biatrial enlargement and elevated PA pressure (elevated from previous), mild to mod MR/TR . Cont ASA 81mg. Continue metoprolol today 50 BID.    PULM: Aspiration PNA: S/p Reintubation on 7/3 and extubation on 7/9 - Cont Duonebs. Mucomyst and Inhaled Hypertonic saline d/c'ed.  Scopalamine d/c;ed,  glycopyrrolate d/c'ed. Hx of recent ARDS/PNA resolved and trach decannulated 6/28.   GI/FEN: Speech and Swallow. FEES performed. Recommending minced and moist with thin liquids. No signs of aspiration. Continue TF Vital 1.0 at 70cc via feeding J-tube with LPS x3 until PO intake increases. Diarrhea on imodium, MTV, Protonix. Hemorrhagic pancreatitis: s/p multiple drain placements and paracentisis by IR team. Cdiff negative (6/19).  : acute renal failure- been off CVVHD since mid June with renal recovery. Voids.  HEME: Acute blood loss anemia: hg stable after 1 unit PRBC 7/10.   ENDO: mISS  ID: Sepsis without shock, Pseudomonas in Sputum: on Meropenem Completed ( 7/7-7/11) as per Surgical team request.     - PRIOR ABX: Ertapenem (6/13-6/16, 6/18-6/23), meropenem (5/21-6/5, 6/9-6/13, 7/2-7/5), vanco (6/9, 6/18-6/22, 7/2-7/5), caspofungin (5/23-5/30, 6/9-6/12, 6/18-6/21), Ceftriaxone( 6/5- 6/15), Ampicillin (5/21- 5/27). Zosyn: ( 7/- 7/7)   - Prior Cultures: Kleb bacteremia from 5/15 & 5/17. bronch cx kleb, enterobacter (zosyn, erta resistant), E faecalis, strep angionosus from 5/19, pancreatic abscess cx pan-sensitive kleb 5/22.   PPX: SCDs, SQH  LINES: PIVs // L rad kalpana (5/31-6/15), 5Fr PIV in LUE (6/13-15), Lsubclav HD Cath (5/31-6/12), RIJ TLC (5/22-5/27), RIJ HD cath (5/22-31), R Ax kalpana(5/12-5/31), LIJ TLC (5/23-6/1), RIJ TLC (6/1-13)   WOUNDS/DRAINS: CODIE removed. midline incision healed.   PT: PT/OT consult requested for early mobilization.   Dispo: SICU

## 2023-07-13 NOTE — PROGRESS NOTE ADULT - SUBJECTIVE AND OBJECTIVE BOX
SUBJECTIVE: Patient seen today during morning rounds. Tolerating liquids, on room air.     MEDICATIONS  (STANDING):  acetaminophen   Oral Liquid .. 975 milliGRAM(s) Oral every 6 hours  albuterol/ipratropium for Nebulization 3 milliLiter(s) Nebulizer every 6 hours  artificial  tears Solution 1 Drop(s) Both EYES two times a day  aspirin  chewable 81 milliGRAM(s) Oral daily  calamine/zinc oxide Lotion 1 Application(s) Topical two times a day  chlorhexidine 2% Cloths 1 Application(s) Topical daily  dextrose 5%. 1000 milliLiter(s) (100 mL/Hr) IV Continuous <Continuous>  dextrose 50% Injectable 25 Gram(s) IV Push once  glucagon  Injectable 1 milliGRAM(s) IntraMuscular once  glycopyrrolate Injectable 0.1 milliGRAM(s) IV Push every 6 hours  heparin   Injectable 5000 Unit(s) SubCutaneous every 8 hours  insulin lispro (ADMELOG) corrective regimen sliding scale   SubCutaneous every 6 hours  lacosamide Solution 200 milliGRAM(s) Oral two times a day  loperamide Liquid 2 milliGRAM(s) Enteral Tube every 6 hours  metoprolol tartrate 50 milliGRAM(s) Oral every 12 hours  multivitamin/minerals/iron Oral Solution (CENTRUM) 15 milliLiter(s) Oral daily  nystatin Powder 1 Application(s) Topical every 8 hours  pantoprazole  Injectable 40 milliGRAM(s) IV Push daily  sodium chloride 3%  Inhalation 4 milliLiter(s) Inhalation every 6 hours    MEDICATIONS  (PRN):  dextrose Oral Gel 15 Gram(s) Oral once PRN Blood Glucose LESS THAN 70 milliGRAM(s)/deciliter  melatonin Liquid 5 milliGRAM(s) Oral at bedtime PRN Sleep  ondansetron Injectable 4 milliGRAM(s) IV Push every 8 hours PRN Nausea and/or Vomiting  oxyCODONE    Solution 5 milliGRAM(s) Oral every 6 hours PRN breakthrough pain  sodium chloride 0.9% lock flush 10 milliLiter(s) IV Push every 1 hour PRN Pre/post blood products, medications, blood draw, and to maintain line patency      Drips:     ICU Vital Signs Last 24 Hrs  T(C): 37.1 (13 Jul 2023 04:59), Max: 37.2 (12 Jul 2023 14:00)  T(F): 98.8 (13 Jul 2023 04:59), Max: 98.9 (12 Jul 2023 14:00)  HR: 93 (13 Jul 2023 08:00) (91 - 116)  BP: 155/99 (13 Jul 2023 08:00) (115/102 - 165/90)  BP(mean): 121 (13 Jul 2023 08:00) (109 - 127)  ABP: --  ABP(mean): --  RR: 12 (13 Jul 2023 08:00) (11 - 22)  SpO2: 98% (13 Jul 2023 08:00) (95% - 100%)    O2 Parameters below as of 13 Jul 2023 08:00  Patient On (Oxygen Delivery Method): room air        Physical Exam:  Neurological: alert   ENT: mucus membrane moist  Cardiovascular: RRR + murmur  Respiratory: CTA  Gastrointestinal: soft, NT, ND, BS+, midline incision healing, no erythema, no bleeding, no pus.   Extremities: warm, 2+ dependent edema   Psych: No signs of anxiety or depression        I&O's Summary    12 Jul 2023 07:01  -  13 Jul 2023 07:00  --------------------------------------------------------  IN: 540 mL / OUT: 500 mL / NET: 40 mL        LABS:                        7.6    12.70 )-----------( 513      ( 13 Jul 2023 05:30 )             23.9     07-13    140  |  108  |  15  ----------------------------<  81  4.3   |  23  |  0.48<L>    Ca    8.2<L>      13 Jul 2023 05:30  Phos  3.9     07-13  Mg     2.1     07-13        Urinalysis Basic - ( 13 Jul 2023 05:30 )    Color: x / Appearance: x / SG: x / pH: x  Gluc: 81 mg/dL / Ketone: x  / Bili: x / Urobili: x   Blood: x / Protein: x / Nitrite: x   Leuk Esterase: x / RBC: x / WBC x   Sq Epi: x / Non Sq Epi: x / Bacteria: x      CAPILLARY BLOOD GLUCOSE      POCT Blood Glucose.: 85 mg/dL (13 Jul 2023 05:49)  POCT Blood Glucose.: 89 mg/dL (12 Jul 2023 23:17)  POCT Blood Glucose.: 85 mg/dL (12 Jul 2023 18:32)  POCT Blood Glucose.: 95 mg/dL (12 Jul 2023 11:49)

## 2023-07-13 NOTE — PROGRESS NOTE ADULT - ATTENDING COMMENTS
HTN, seizures with TEVAR c/b pancreatitis and Klebsiella sepsis, aspiration pna  physical as above  tolerating minced and moist diet  DC glycopyrrholate, mucomyst  PT  follow off antibiotics  continue full J tube feeds for now I will STOP taking the medications listed below when I get home from the hospital:  None

## 2023-07-13 NOTE — PROGRESS NOTE ADULT - SUBJECTIVE AND OBJECTIVE BOX
Interval Events: No acute events overnight      Subjective Events: Patient seen and examined at bedside.      Allergies    No Known Allergies    Intolerances        Vital Signs Last 24 Hrs  T(C): 37.1 (13 Jul 2023 04:59), Max: 37.2 (12 Jul 2023 14:00)  T(F): 98.8 (13 Jul 2023 04:59), Max: 98.9 (12 Jul 2023 14:00)  HR: 96 (13 Jul 2023 05:00) (91 - 116)  BP: 160/97 (13 Jul 2023 05:00) (138/93 - 165/90)  BP(mean): 122 (13 Jul 2023 05:00) (109 - 127)  RR: 11 (13 Jul 2023 05:00) (11 - 20)  SpO2: 97% (13 Jul 2023 05:00) (95% - 100%)    Parameters below as of 13 Jul 2023 05:00  Patient On (Oxygen Delivery Method): room air        07-11 @ 07:01  -  07-12 @ 07:00  --------------------------------------------------------  IN: 1680 mL / OUT: 925 mL / NET: 755 mL    07-12 @ 07:01  -  07-13 @ 06:34  --------------------------------------------------------  IN: 540 mL / OUT: 500 mL / NET: 40 mL      07-11 @ 07:01  -  07-12 @ 07:00  --------------------------------------------------------  IN: 1680 mL / OUT: 925 mL / NET: 755 mL    07-12 @ 07:01  -  07-13 @ 06:34  --------------------------------------------------------  IN: 540 mL / OUT: 500 mL / NET: 40 mL        Physical Exam:     Gen: No acute events   Neuro: A&OX3 No deficits  CV:RRR Reg s1s2 noM  Pulm: Lung sounds clear bilaterally   Abd: Soft NT ND + BS  Ext: Warm, well-perfused   Vasc: +2 radial and pedal pulses  Skin: no rashes noted  MSK: No joint swelling  Psych: No signs of anxiety or depression      LABS:      CBC Full  -  ( 13 Jul 2023 05:30 )  WBC Count : 12.70 K/uL  RBC Count : 2.59 M/uL  Hemoglobin : 7.6 g/dL  Hematocrit : 23.9 %  Platelet Count - Automated : 513 K/uL  Mean Cell Volume : 92.3 fl  Mean Cell Hemoglobin : 29.3 pg  Mean Cell Hemoglobin Concentration : 31.8 gm/dL  Auto Neutrophil # : x  Auto Lymphocyte # : x  Auto Monocyte # : x  Auto Eosinophil # : x  Auto Basophil # : x  Auto Neutrophil % : x  Auto Lymphocyte % : x  Auto Monocyte % : x  Auto Eosinophil % : x  Auto Basophil % : x    07-13    140  |  108  |  15  ----------------------------<  81  4.3   |  23  |  x     Ca    8.1<L>      12 Jul 2023 05:30  Phos  3.9     07-13  Mg     2.1     07-13            Urinalysis Basic - ( 13 Jul 2023 05:30 )    Color: x / Appearance: x / SG: x / pH: x  Gluc: 81 mg/dL / Ketone: x  / Bili: x / Urobili: x   Blood: x / Protein: x / Nitrite: x   Leuk Esterase: x / RBC: x / WBC x   Sq Epi: x / Non Sq Epi: x / Bacteria: x              RADIOLOGY & ADDITIONAL STUDIES (The following images were personally reviewed):          A/p: 53yMale Interval Events: No acute events overnight      Subjective Events: Patient seen and examined at bedside. No acute distress.       Allergies    No Known Allergies    Intolerances        Vital Signs Last 24 Hrs  T(C): 37.1 (13 Jul 2023 04:59), Max: 37.2 (12 Jul 2023 14:00)  T(F): 98.8 (13 Jul 2023 04:59), Max: 98.9 (12 Jul 2023 14:00)  HR: 96 (13 Jul 2023 05:00) (91 - 116)  BP: 160/97 (13 Jul 2023 05:00) (138/93 - 165/90)  BP(mean): 122 (13 Jul 2023 05:00) (109 - 127)  RR: 11 (13 Jul 2023 05:00) (11 - 20)  SpO2: 97% (13 Jul 2023 05:00) (95% - 100%)    Parameters below as of 13 Jul 2023 05:00  Patient On (Oxygen Delivery Method): room air        07-11 @ 07:01  -  07-12 @ 07:00  --------------------------------------------------------  IN: 1680 mL / OUT: 925 mL / NET: 755 mL    07-12 @ 07:01  -  07-13 @ 06:34  --------------------------------------------------------  IN: 540 mL / OUT: 500 mL / NET: 40 mL      07-11 @ 07:01  -  07-12 @ 07:00  --------------------------------------------------------  IN: 1680 mL / OUT: 925 mL / NET: 755 mL    07-12 @ 07:01  -  07-13 @ 06:34  --------------------------------------------------------  IN: 540 mL / OUT: 500 mL / NET: 40 mL        Physical Exam:     Gen: No acute events   Neuro: A&OX3 No deficits  CV: Regular rate and rhythm, S3 murmur, no peripheral edema   Pulm: Lung sounds clear bilaterally   Abd: Soft NT ND + BS  Ext: Warm, well-perfused   Vasc: +2 radial and pedal pulses  Skin: no rashes noted  MSK: No joint swelling  Psych: No signs of anxiety or depression      LABS:      CBC Full  -  ( 13 Jul 2023 05:30 )  WBC Count : 12.70 K/uL  RBC Count : 2.59 M/uL  Hemoglobin : 7.6 g/dL  Hematocrit : 23.9 %  Platelet Count - Automated : 513 K/uL  Mean Cell Volume : 92.3 fl  Mean Cell Hemoglobin : 29.3 pg  Mean Cell Hemoglobin Concentration : 31.8 gm/dL      07-13    140  |  108  |  15  ----------------------------<  81  4.3   |  23  |  x     Ca    8.1<L>      12 Jul 2023 05:30  Phos  3.9     07-13  Mg     2.1     07-13            Urinalysis Basic - ( 13 Jul 2023 05:30 )    Color: x / Appearance: x / SG: x / pH: x  Gluc: 81 mg/dL / Ketone: x  / Bili: x / Urobili: x   Blood: x / Protein: x / Nitrite: x   Leuk Esterase: x / RBC: x / WBC x   Sq Epi: x / Non Sq Epi: x / Bacteria: x

## 2023-07-13 NOTE — PROGRESS NOTE ADULT - ASSESSMENT
53M w PMHx of HTN, type A aortic dissection s/p Dacron grafts, AV resuspension in 2013, CAD s/p CABG x 1 SVG to RCA (5/2013 with Dr. Medina), seizure disorder, recent admission 3/20-4/14 for replacement of transverse aortic arch, second stage TEVAR and aorto-axillary bypass, AV replacement (bio 23mm), and CABG x 1 (SVG-RCA). Pt found to have endo leak and taken to OR for TEVAR revision on 5/5. Post op course c/b Klebsiella bacteremia (5/15), resp failure requiring intubation on 5/18, Mucus plugging s/p Bronch 5/19 and PEA arrest. Post operatively pt also found to have hemorrhagic pancreatitis with venu-pancreatic abscess possibly 2* to Depakote therefore s/p IR aspiration of peripancreatic fluid collection and paracentesis on 5/22, IR venu-pancreatic abscess drainage and placement of drainage catheter on 5/24. Pt then transferred from CTICU to SICU for septic shock, and further mgmt of hemorrhagic pancreatitis on 5/25. s/p IR placement of 2 new drainage catheters and catheter exchange on 5/26. LUQ drainage 5/30. OR 5/31 for exlap washout & replacement of 3 new JPs and abthera vac with open abdomen. RTOR 6/1, no bleeding, evac of old blood, placement of feeding J-tube, failed extubation due to PNA, completed ABX course and s/p trach 6/5 and decannulation 6/28. stepped down, but returned to SICU (7/2) for respiratory distress. Emergently intubated (7/3) for aspiration pneumonia, sputum cx grew pseudomonas. ARDS improving, patient extubated on 7/9, tolerating room air, mobilizing respiratory secretions. Patient tolerating thick liquids.     Plan   - Can step down to regular unit   - Team 1 will follow

## 2023-07-14 LAB
ANION GAP SERPL CALC-SCNC: 12 MMOL/L — SIGNIFICANT CHANGE UP (ref 5–17)
BUN SERPL-MCNC: 14 MG/DL — SIGNIFICANT CHANGE UP (ref 7–23)
CALCIUM SERPL-MCNC: 8.5 MG/DL — SIGNIFICANT CHANGE UP (ref 8.4–10.5)
CHLORIDE SERPL-SCNC: 106 MMOL/L — SIGNIFICANT CHANGE UP (ref 96–108)
CO2 SERPL-SCNC: 21 MMOL/L — LOW (ref 22–31)
CREAT SERPL-MCNC: 0.52 MG/DL — SIGNIFICANT CHANGE UP (ref 0.5–1.3)
EGFR: 120 ML/MIN/1.73M2 — SIGNIFICANT CHANGE UP
GLUCOSE SERPL-MCNC: 78 MG/DL — SIGNIFICANT CHANGE UP (ref 70–99)
HCT VFR BLD CALC: 26.2 % — LOW (ref 39–50)
HGB BLD-MCNC: 8.1 G/DL — LOW (ref 13–17)
MAGNESIUM SERPL-MCNC: 2 MG/DL — SIGNIFICANT CHANGE UP (ref 1.6–2.6)
MCHC RBC-ENTMCNC: 29.6 PG — SIGNIFICANT CHANGE UP (ref 27–34)
MCHC RBC-ENTMCNC: 30.9 GM/DL — LOW (ref 32–36)
MCV RBC AUTO: 95.6 FL — SIGNIFICANT CHANGE UP (ref 80–100)
NRBC # BLD: 0 /100 WBCS — SIGNIFICANT CHANGE UP (ref 0–0)
PHOSPHATE SERPL-MCNC: 4.5 MG/DL — SIGNIFICANT CHANGE UP (ref 2.5–4.5)
PLATELET # BLD AUTO: 492 K/UL — HIGH (ref 150–400)
POTASSIUM SERPL-MCNC: 4.3 MMOL/L — SIGNIFICANT CHANGE UP (ref 3.5–5.3)
POTASSIUM SERPL-SCNC: 4.3 MMOL/L — SIGNIFICANT CHANGE UP (ref 3.5–5.3)
RBC # BLD: 2.74 M/UL — LOW (ref 4.2–5.8)
RBC # FLD: 18 % — HIGH (ref 10.3–14.5)
SODIUM SERPL-SCNC: 139 MMOL/L — SIGNIFICANT CHANGE UP (ref 135–145)
WBC # BLD: 11.61 K/UL — HIGH (ref 3.8–10.5)
WBC # FLD AUTO: 11.61 K/UL — HIGH (ref 3.8–10.5)

## 2023-07-14 RX ADMIN — Medication 975 MILLIGRAM(S): at 00:15

## 2023-07-14 RX ADMIN — PANTOPRAZOLE SODIUM 40 MILLIGRAM(S): 20 TABLET, DELAYED RELEASE ORAL at 05:51

## 2023-07-14 RX ADMIN — Medication 1 DROP(S): at 18:30

## 2023-07-14 RX ADMIN — Medication 975 MILLIGRAM(S): at 05:50

## 2023-07-14 RX ADMIN — NYSTATIN CREAM 1 APPLICATION(S): 100000 CREAM TOPICAL at 05:50

## 2023-07-14 RX ADMIN — Medication 975 MILLIGRAM(S): at 06:56

## 2023-07-14 RX ADMIN — LACOSAMIDE 200 MILLIGRAM(S): 50 TABLET ORAL at 18:29

## 2023-07-14 RX ADMIN — Medication 3 MILLILITER(S): at 18:29

## 2023-07-14 RX ADMIN — OXYCODONE HYDROCHLORIDE 5 MILLIGRAM(S): 5 TABLET ORAL at 18:34

## 2023-07-14 RX ADMIN — HEPARIN SODIUM 5000 UNIT(S): 5000 INJECTION INTRAVENOUS; SUBCUTANEOUS at 13:45

## 2023-07-14 RX ADMIN — Medication 2 MILLIGRAM(S): at 12:05

## 2023-07-14 RX ADMIN — OXYCODONE HYDROCHLORIDE 5 MILLIGRAM(S): 5 TABLET ORAL at 00:00

## 2023-07-14 RX ADMIN — Medication 2 MILLIGRAM(S): at 18:34

## 2023-07-14 RX ADMIN — CALAMINE AND ZINC OXIDE AND PHENOL 1 APPLICATION(S): 160; 10 LOTION TOPICAL at 18:30

## 2023-07-14 RX ADMIN — Medication 1 DROP(S): at 05:49

## 2023-07-14 RX ADMIN — Medication 3 MILLILITER(S): at 12:05

## 2023-07-14 RX ADMIN — Medication 15 MILLILITER(S): at 12:03

## 2023-07-14 RX ADMIN — Medication 50 MILLIGRAM(S): at 05:50

## 2023-07-14 RX ADMIN — Medication 81 MILLIGRAM(S): at 12:06

## 2023-07-14 RX ADMIN — Medication 3 MILLILITER(S): at 04:04

## 2023-07-14 RX ADMIN — CALAMINE AND ZINC OXIDE AND PHENOL 1 APPLICATION(S): 160; 10 LOTION TOPICAL at 05:49

## 2023-07-14 RX ADMIN — NYSTATIN CREAM 1 APPLICATION(S): 100000 CREAM TOPICAL at 21:47

## 2023-07-14 RX ADMIN — Medication 975 MILLIGRAM(S): at 18:29

## 2023-07-14 RX ADMIN — Medication 975 MILLIGRAM(S): at 18:59

## 2023-07-14 RX ADMIN — Medication 975 MILLIGRAM(S): at 14:57

## 2023-07-14 RX ADMIN — LACOSAMIDE 200 MILLIGRAM(S): 50 TABLET ORAL at 05:50

## 2023-07-14 RX ADMIN — HEPARIN SODIUM 5000 UNIT(S): 5000 INJECTION INTRAVENOUS; SUBCUTANEOUS at 05:50

## 2023-07-14 RX ADMIN — Medication 975 MILLIGRAM(S): at 12:06

## 2023-07-14 RX ADMIN — Medication 50 MILLIGRAM(S): at 18:30

## 2023-07-14 RX ADMIN — NYSTATIN CREAM 1 APPLICATION(S): 100000 CREAM TOPICAL at 13:45

## 2023-07-14 RX ADMIN — Medication 2 MILLIGRAM(S): at 05:49

## 2023-07-14 RX ADMIN — HEPARIN SODIUM 5000 UNIT(S): 5000 INJECTION INTRAVENOUS; SUBCUTANEOUS at 21:48

## 2023-07-14 NOTE — CHART NOTE - NSCHARTNOTEFT_GEN_A_CORE
Admitting Diagnosis:   Patient is a 53y old  Male who presents with a chief complaint of endoleak, abdominal pain (14 Jul 2023 06:09)      PAST MEDICAL & SURGICAL HISTORY:  HTN (hypertension)      Aortic dissection      CAD (coronary artery disease)      Seizure disorder      Seizure disorder      Hypertension      Status post endovascular aneurysm repair (EVAR)      S/P aortic bifurcation bypass graft      S/P CABG x 1        Current Nutrition Order:   Miced moist     PO Intake: Good (%) [   ]  Fair (50-75%) [ X ] Poor (<25%) [   ] -50%     GI Issues: denies nvdc; however per chart noted loose stools yesterday     Pain: denies pain/discomfort on assessment     Skin Integrity:  Surgical incision to abdomen. PU stg II to sacrum & L scapula, penis, skin tear to L ear  2+ edema to L/R ankles   jonathan sale 14     Labs:   07-14    139  |  106  |  14  ----------------------------<  78  4.3   |  21<L>  |  0.52    Ca    8.5      14 Jul 2023 06:07  Phos  4.5     07-14  Mg     2.0     07-14      CAPILLARY BLOOD GLUCOSE      POCT Blood Glucose.: 106 mg/dL (13 Jul 2023 17:40)      Medications:  MEDICATIONS  (STANDING):  acetaminophen   Oral Liquid .. 975 milliGRAM(s) Oral every 6 hours  albuterol/ipratropium for Nebulization 3 milliLiter(s) Nebulizer every 6 hours  artificial  tears Solution 1 Drop(s) Both EYES two times a day  aspirin  chewable 81 milliGRAM(s) Oral daily  calamine/zinc oxide Lotion 1 Application(s) Topical two times a day  dextrose 50% Injectable 25 Gram(s) IV Push once  dextrose 50% Injectable 25 Gram(s) IV Push once  dextrose 50% Injectable 12.5 Gram(s) IV Push once  dextrose Oral Gel 15 Gram(s) Oral once  glucagon  Injectable 1 milliGRAM(s) IntraMuscular once  heparin   Injectable 5000 Unit(s) SubCutaneous every 8 hours  lacosamide Solution 200 milliGRAM(s) Oral two times a day  loperamide Liquid 2 milliGRAM(s) Enteral Tube every 6 hours  metoprolol tartrate 50 milliGRAM(s) Oral every 12 hours  multivitamin/minerals/iron Oral Solution (CENTRUM) 15 milliLiter(s) Oral daily  nystatin Powder 1 Application(s) Topical every 8 hours  pantoprazole    Tablet 40 milliGRAM(s) Oral before breakfast    MEDICATIONS  (PRN):  melatonin Liquid 5 milliGRAM(s) Oral at bedtime PRN Sleep  ondansetron Injectable 4 milliGRAM(s) IV Push every 8 hours PRN Nausea and/or Vomiting  oxyCODONE    Solution 5 milliGRAM(s) Oral every 6 hours PRN breakthrough pain  sodium chloride 0.9% lock flush 10 milliLiter(s) IV Push every 1 hour PRN Pre/post blood products, medications, blood draw, and to maintain line patency      Weight:  Daily     Daily     Weight Change:   Weight: 91kg [10 Ghazala 2023]  Daily   90.9kg [7 June 2023]  Daily 83.3kg [4 July 2023]    Estimated energy needs:   Weight Change: 7.7kg/8.4% wt loss x 1 month- ? 2/2 fluid shifts/inadequate nutrition. Need reweight to confirm. Please obtain weekly weights to ensure adequacy of nutrition provision    Estimated energy needs:   Ideal body weight (80.9kg) used for calculations as pt >100% of IBW, BMI <30 per [facility] Standards of Care. Needs estimated for age and adjusted for current clinical status     Calories: 2022.5-2427 kcals based on 25-30 kcals/kg  Protein: 113.3-129.4g protein based on 1.4-1.6g protein/kg  *Fluid Needs per team    Subjective:   53M w PMHx of HTN, type A aortic dissection s/p Dacron grafts, AV resuspension in 2013, CAD s/p CABG x 1 SVG to RCA (5/2013 with Dr. Medina), seizure disorder, recent admission 3/20-4/14 for replacement of transverse aortic arch, second stage TEVAR and aorto-axillary bypass, AV replacement (bio 23mm), and CABG x 1 (SVG-RCA). Pt found to have endo leak and taken to OR for TEVAR revision on 5/5. Post op course c/b Klebsiella bacteremia (5/15), resp failure requiring intubation on 5/18, Mucus plugging s/p Bronch 5/19 and PEA arrest. Post operatively pt also found to have hemorrhagic pancreatitis with venu-pancreatic abscess possibly 2* to Depakote therefore s/p IR aspiration of peripancreatic fluid collection and paracentesis on 5/22, IR venu-pancreatic abscess drainage and placement of drainage catheter on 5/24. Pt then transferred from CTICU to SICU for septic shock, and further mgmt of hemorrhagic pancreatitis on 5/25. s/p IR placement of 2 new drainage catheters and catheter exchange on 5/26. LUQ drainage 5/30. OR 5/31 for exlap washout & replacement of 3 new JPs and abthera vac with open abdomen. RTOR 6/1, no bleeding, evac of old blood, placement of feeding J-tube, failed extubation due to PNA, completed ABX course and s/p trach 6/5 and decannulation 6/28. stepped down, but returned to SICU (7/2) for respiratory distress. Emergently intubated (7/3) for aspiration pneumonia, sputum cx grew pseudomonas. Extubated on 7/9. FEES 7/12: minced moist with thin. 7/14: SDU from SICU     Visited pt at bedside this am on 8LA. On assessment pt is lethargic. Diet regimen started 2 days ago (minced moist). Pt reports fair appetite; tolerates ~50% meal tray. Denies cultural/personal preferences. Discussed importance of adequate intake. Discussed ways to ensure optimal nutrition in-house. Pt agrees trialing oral nutrition supplement regimen to better meet his estimated nutrition needs. See below. Please continue to monitor po and provide max encouragement at all meals. Nutrition related labs are unremarkable at this time. Per chart, had loose stools yesterday. Remains with multiple PUs; will recommend adding Peter 1x/day. Please view full recs below. RD to remain available     Previous Nutrition Diagnosis:  Increased nutrients RT increased demands AEB clinical course, Pressure ulcers    Active [ X ]  Resolved [   ]    Goal: Pt will meet at least 75% of protein & energy needs via most appropriate route for nutrition    Recommendations:  1. Continue current diet order (regular; minced moist)  > align nutrition with SLP recs at all times   2. Add Ensure Enlive BID (350Kcal, 20g protein each)   3. Monitor PO tolerance/intake closely; provide max encouragement at all meals  4. Honor food preferences as able   5. Add Peter 1x/day to promote better wound healing   6. Monitor GI fxn, skin integrity, labs, wts   > Consider adding Banatrol BID if continues with loose stools   7. RD to remain available for recs adjustment prn or will follow up pt per organizational policy     Education: encouraged adequate PO intake with focus on nutrient dense foods. Discussed ways to ensure optimal nutrition in-house, including oral nutrition supplement regimen.     Risk Level: High [ X ] Moderate [   ] Low [   ]

## 2023-07-14 NOTE — PROGRESS NOTE ADULT - SUBJECTIVE AND OBJECTIVE BOX
INCOMPLETE  STATUS POST:     5/5: second stage TEVAR  5/13: IR Celiac, SMA, splenic, and left gastric artery angiogram reveals no pseudoaneurysm or any active bleeding.  5/22:  IR Aspiration of left peripancreatic fluid collection  (15cc sanguinous) and paracentesis (4L clear yellow fluid)  5/24: IR L peripancreatic abscess collection drainage and placement of 12F drainage catheter  5/25: Rosalee upsized existing abscess drain to 16F, placed new drain to another abscess collection on left, and two drain for ascites.  5/26: Placement of two new drainage catheters and exchange of two   drainage catheters  5/30: IR LUQ drainage of 400cc purulent fluid  5/31: ex lap, wash out of ascites/old blood/scant new blood, all CODIE drain removal, CODIE x3 placement, abdomen open, UOP 0, EBL??  6/1: No bleeding, evac of old blood, placement of feeding J-tube, abd closure  6/5: Bedside trach (Pulm)  6/9: Bronchoscopy, lavage    ON: SDU from SICU    SUBJECTIVE: Patient seen and examined bedside by chief resident, .    aspirin  chewable 81 milliGRAM(s) Oral daily  heparin   Injectable 5000 Unit(s) SubCutaneous every 8 hours  metoprolol tartrate 50 milliGRAM(s) Oral every 12 hours      Vital Signs Last 24 Hrs  T(C): 36.5 (14 Jul 2023 04:46), Max: 37.1 (13 Jul 2023 09:00)  T(F): 97.7 (14 Jul 2023 04:46), Max: 98.8 (13 Jul 2023 09:00)  HR: 86 (14 Jul 2023 04:04) (86 - 104)  BP: 164/102 (14 Jul 2023 04:04) (141/87 - 169/98)  BP(mean): 128 (14 Jul 2023 04:04) (109 - 131)  RR: 18 (14 Jul 2023 04:04) (10 - 21)  SpO2: 97% (14 Jul 2023 04:04) (95% - 100%)    Parameters below as of 14 Jul 2023 04:04  Patient On (Oxygen Delivery Method): room air      I&O's Detail    12 Jul 2023 07:01  -  13 Jul 2023 07:00  --------------------------------------------------------  IN:    Enteral Tube Flush: 120 mL    Vital1.0: 420 mL  Total IN: 540 mL    OUT:    Other (mL): 0 mL    Voided (mL): 500 mL  Total OUT: 500 mL    Total NET: 40 mL      13 Jul 2023 07:01  -  14 Jul 2023 06:10  --------------------------------------------------------  IN:    Enteral Tube Flush: 335 mL  Total IN: 335 mL    OUT:    Voided (mL): 500 mL  Total OUT: 500 mL    Total NET: -165 mL          General: NAD, resting comfortably in bed  C/V: NSR  Pulm: Nonlabored breathing, no respiratory distress  Abd: soft, NT/ND.  Extrem: WWP, no edema, SCDs in place        LABS:                        7.6    12.70 )-----------( 513      ( 13 Jul 2023 05:30 )             23.9     07-13    140  |  108  |  15  ----------------------------<  81  4.3   |  23  |  0.48<L>    Ca    8.2<L>      13 Jul 2023 05:30  Phos  3.9     07-13  Mg     2.1     07-13        Urinalysis Basic - ( 13 Jul 2023 05:30 )    Color: x / Appearance: x / SG: x / pH: x  Gluc: 81 mg/dL / Ketone: x  / Bili: x / Urobili: x   Blood: x / Protein: x / Nitrite: x   Leuk Esterase: x / RBC: x / WBC x   Sq Epi: x / Non Sq Epi: x / Bacteria: x        RADIOLOGY & ADDITIONAL STUDIES:     STATUS POST:     5/5: second stage TEVAR  5/13: IR Celiac, SMA, splenic, and left gastric artery angiogram reveals no pseudoaneurysm or any active bleeding.  5/22:  IR Aspiration of left peripancreatic fluid collection  (15cc sanguinous) and paracentesis (4L clear yellow fluid)  5/24: IR L peripancreatic abscess collection drainage and placement of 12F drainage catheter  5/25: Rosalee upsized existing abscess drain to 16F, placed new drain to another abscess collection on left, and two drain for ascites.  5/26: Placement of two new drainage catheters and exchange of two   drainage catheters  5/30: IR LUQ drainage of 400cc purulent fluid  5/31: ex lap, wash out of ascites/old blood/scant new blood, all CODIE drain removal, CODIE x3 placement, abdomen open,  6/1: No bleeding, evac of old blood, placement of feeding J-tube, abd closure  6/5: Bedside trach (Pulm)  6/9: Bronchoscopy, lavage    ON: SDU from SICU    SUBJECTIVE: Patient seen and examined bedside by chief resident, Patient denies any pain, nausea, or vomiting.     aspirin  chewable 81 milliGRAM(s) Oral daily  heparin   Injectable 5000 Unit(s) SubCutaneous every 8 hours  metoprolol tartrate 50 milliGRAM(s) Oral every 12 hours      Vital Signs Last 24 Hrs  T(C): 36.5 (14 Jul 2023 04:46), Max: 37.1 (13 Jul 2023 09:00)  T(F): 97.7 (14 Jul 2023 04:46), Max: 98.8 (13 Jul 2023 09:00)  HR: 86 (14 Jul 2023 04:04) (86 - 104)  BP: 164/102 (14 Jul 2023 04:04) (141/87 - 169/98)  BP(mean): 128 (14 Jul 2023 04:04) (109 - 131)  RR: 18 (14 Jul 2023 04:04) (10 - 21)  SpO2: 97% (14 Jul 2023 04:04) (95% - 100%)    Parameters below as of 14 Jul 2023 04:04  Patient On (Oxygen Delivery Method): room air      I&O's Detail    12 Jul 2023 07:01  -  13 Jul 2023 07:00  --------------------------------------------------------  IN:    Enteral Tube Flush: 120 mL    Vital1.0: 420 mL  Total IN: 540 mL    OUT:    Other (mL): 0 mL    Voided (mL): 500 mL  Total OUT: 500 mL    Total NET: 40 mL      13 Jul 2023 07:01  -  14 Jul 2023 06:10  --------------------------------------------------------  IN:    Enteral Tube Flush: 335 mL  Total IN: 335 mL    OUT:    Voided (mL): 500 mL  Total OUT: 500 mL    Total NET: -165 mL          General: NAD, resting comfortably in bed  C/V: NSR  Pulm: Nonlabored breathing, no respiratory distress  Abd: soft, NT/ND.  Extrem: WWP, no edema, SCDs in place        LABS:                        7.6    12.70 )-----------( 513      ( 13 Jul 2023 05:30 )             23.9     07-13    140  |  108  |  15  ----------------------------<  81  4.3   |  23  |  0.48<L>    Ca    8.2<L>      13 Jul 2023 05:30  Phos  3.9     07-13  Mg     2.1     07-13        Urinalysis Basic - ( 13 Jul 2023 05:30 )    Color: x / Appearance: x / SG: x / pH: x  Gluc: 81 mg/dL / Ketone: x  / Bili: x / Urobili: x   Blood: x / Protein: x / Nitrite: x   Leuk Esterase: x / RBC: x / WBC x   Sq Epi: x / Non Sq Epi: x / Bacteria: x        RADIOLOGY & ADDITIONAL STUDIES:

## 2023-07-14 NOTE — PROGRESS NOTE ADULT - ASSESSMENT
A/p: 53M w PMHx of HTN, type A aortic dissection s/p Dacron grafts, AV resuspension in 2013, CAD s/p CABG x 1 SVG to RCA (5/2013 with Dr. Medina), seizure disorder, recent admission 3/20-4/14 for replacement of transverse aortic arch, second stage TEVAR and aorto-axillary bypass, AV replacement (bio 23mm), and CABG x 1 (SVG-RCA). Pt found to have endo leak and taken to OR for TEVAR revision on 5/5. Post op course c/b Klebsiella bacteremia (5/15), resp failure requiring intubation on 5/18, Mucus plugging s/p Bronch 5/19 and PEA arrest. Post operatively pt also found to have hemorrhagic pancreatitis with venu-pancreatic abscess possibly 2* to Depakote therefore s/p IR aspiration of peripancreatic fluid collection and paracentesis on 5/22, IR venu-pancreatic abscess drainage and placement of drainage catheter on 5/24. Pt then transferred from CTICU to SICU for septic shock, and further mgmt of hemorrhagic pancreatitis on 5/25. s/p IR placement of 2 new drainage catheters and catheter exchange on 5/26. LUQ drainage 5/30. OR 5/31 for exlap washout & replacement of 3 new JPs and abthera vac with open abdomen. RTOR 6/1, no bleeding, evac of old blood, placement of feeding J-tube, failed extubation due to PNA, completed ABX course and s/p trach 6/5 and decannulation 6/28. stepped down, but returned to SICU (7/2) for respiratory distress. Emergently intubated (7/3) for aspiration pneumonia, sputum cx grew pseudomonas. Extubated on 7/9.    NEURO: Standing oxycodone d/c'ed 2/2 drowsiness. Prn Oxycodone. Hx seizures: cont vimpat. Avoid depakote due to drug-related pancreatitis risk.   HEENT: Artificial tears, Torticollis improved  CV: s/p PEA arrest on 5/24 night. TTE (6/14) LVEF 75%, LVH, biatrial enlargement and elevated PA pressure (elevated from previous), mild to mod MR/TR . Cont ASA 81mg. Continue metoprolol today 50 BID.    PULM: Aspiration PNA: S/p Reintubation on 7/3 and extubation on 7/9 - Cont Duonebs. Mucomyst and Inhaled Hypertonic saline d/c'ed.  Scopalamine d/c;ed,  glycopyrrolate d/c'ed. Hx of recent ARDS/PNA resolved and trach decannulated 6/28.   GI/FEN: Speech and Swallow. FEES performed. Recommending minced and moist with thin liquids. No signs of aspiration. Continue TF Vital 1.0 at 70cc via feeding J-tube with LPS x3 until PO intake increases. Diarrhea on imodium, MTV, Protonix. Hemorrhagic pancreatitis: s/p multiple drain placements and paracentisis by IR team. Cdiff negative (6/19).  : acute renal failure- been off CVVHD since mid June with renal recovery. Voids.  HEME: Acute blood loss anemia: hg stable after 1 unit PRBC 7/10.   ENDO: mISS  ID: Sepsis without shock, Pseudomonas in Sputum: on Meropenem Completed ( 7/7-7/11) as per Surgical team request.     - PRIOR ABX: Ertapenem (6/13-6/16, 6/18-6/23), meropenem (5/21-6/5, 6/9-6/13, 7/2-7/5), vanco (6/9, 6/18-6/22, 7/2-7/5), caspofungin (5/23-5/30, 6/9-6/12, 6/18-6/21), Ceftriaxone( 6/5- 6/15), Ampicillin (5/21- 5/27). Zosyn: ( 7/- 7/7)   - Prior Cultures: Kleb bacteremia from 5/15 & 5/17. bronch cx kleb, enterobacter (zosyn, erta resistant), E faecalis, strep angionosus from 5/19, pancreatic abscess cx pan-sensitive kleb 5/22.   PPX: SCDs, SQH  LINES: PIVs // L rad kalpana (5/31-6/15), 5Fr PIV in LUE (6/13-15), Lsubclav HD Cath (5/31-6/12), RIJ TLC (5/22-5/27), RIJ HD cath (5/22-31), R Ax kalpana(5/12-5/31), LIJ TLC (5/23-6/1), RIJ TLC (6/1-13)   WOUNDS/DRAINS: CODIE removed. midline incision healed.   PT: PT/OT consult requested for early mobilization.   Dispo: SICU  A/p: 53M w PMHx of HTN, type A aortic dissection s/p Dacron grafts, AV resuspension in 2013, CAD s/p CABG x 1 SVG to RCA (5/2013 with Dr. Medina), seizure disorder, recent admission 3/20-4/14 for replacement of transverse aortic arch, second stage TEVAR and aorto-axillary bypass, AV replacement (bio 23mm), and CABG x 1 (SVG-RCA). Pt found to have endo leak and taken to OR for TEVAR revision on 5/5. Post op course c/b Klebsiella bacteremia (5/15), resp failure requiring intubation on 5/18, Mucus plugging s/p Bronch 5/19 and PEA arrest. Post operatively pt also found to have hemorrhagic pancreatitis with venu-pancreatic abscess possibly 2* to Depakote therefore s/p IR aspiration of peripancreatic fluid collection and paracentesis on 5/22, IR venu-pancreatic abscess drainage and placement of drainage catheter on 5/24. Pt then transferred from CTICU to SICU for septic shock, and further mgmt of hemorrhagic pancreatitis on 5/25. s/p IR placement of 2 new drainage catheters and catheter exchange on 5/26. LUQ drainage 5/30. OR 5/31 for exlap washout & replacement of 3 new JPs and abthera vac with open abdomen. RTOR 6/1, no bleeding, evac of old blood, placement of feeding J-tube, failed extubation due to PNA, completed ABX course and s/p trach 6/5 and decannulation 6/28. stepped down, but returned to SICU (7/2) for respiratory distress. Emergently intubated (7/3) for aspiration pneumonia, sputum cx grew pseudomonas. Extubated on 7/9.    Plan:  PT: PT/OT consult requested for early mobilization.   dispo: home vs COURT  f/u nutrition consult   SubqH/SCD  Pain control  Home meds   diet- mince and moist with tube feeds

## 2023-07-15 LAB
ANION GAP SERPL CALC-SCNC: 11 MMOL/L — SIGNIFICANT CHANGE UP (ref 5–17)
BUN SERPL-MCNC: 12 MG/DL — SIGNIFICANT CHANGE UP (ref 7–23)
CALCIUM SERPL-MCNC: 8.1 MG/DL — LOW (ref 8.4–10.5)
CHLORIDE SERPL-SCNC: 107 MMOL/L — SIGNIFICANT CHANGE UP (ref 96–108)
CO2 SERPL-SCNC: 22 MMOL/L — SIGNIFICANT CHANGE UP (ref 22–31)
CREAT SERPL-MCNC: 0.52 MG/DL — SIGNIFICANT CHANGE UP (ref 0.5–1.3)
EGFR: 120 ML/MIN/1.73M2 — SIGNIFICANT CHANGE UP
GLUCOSE SERPL-MCNC: 74 MG/DL — SIGNIFICANT CHANGE UP (ref 70–99)
HCT VFR BLD CALC: 25 % — LOW (ref 39–50)
HGB BLD-MCNC: 7.8 G/DL — LOW (ref 13–17)
MAGNESIUM SERPL-MCNC: 2 MG/DL — SIGNIFICANT CHANGE UP (ref 1.6–2.6)
MCHC RBC-ENTMCNC: 29.2 PG — SIGNIFICANT CHANGE UP (ref 27–34)
MCHC RBC-ENTMCNC: 31.2 GM/DL — LOW (ref 32–36)
MCV RBC AUTO: 93.6 FL — SIGNIFICANT CHANGE UP (ref 80–100)
NRBC # BLD: 0 /100 WBCS — SIGNIFICANT CHANGE UP (ref 0–0)
PHOSPHATE SERPL-MCNC: 4.2 MG/DL — SIGNIFICANT CHANGE UP (ref 2.5–4.5)
PLATELET # BLD AUTO: 498 K/UL — HIGH (ref 150–400)
POTASSIUM SERPL-MCNC: 3.7 MMOL/L — SIGNIFICANT CHANGE UP (ref 3.5–5.3)
POTASSIUM SERPL-SCNC: 3.7 MMOL/L — SIGNIFICANT CHANGE UP (ref 3.5–5.3)
RBC # BLD: 2.67 M/UL — LOW (ref 4.2–5.8)
RBC # FLD: 17.5 % — HIGH (ref 10.3–14.5)
SODIUM SERPL-SCNC: 140 MMOL/L — SIGNIFICANT CHANGE UP (ref 135–145)
WBC # BLD: 11.08 K/UL — HIGH (ref 3.8–10.5)
WBC # FLD AUTO: 11.08 K/UL — HIGH (ref 3.8–10.5)

## 2023-07-15 RX ORDER — POTASSIUM CHLORIDE 20 MEQ
20 PACKET (EA) ORAL ONCE
Refills: 0 | Status: COMPLETED | OUTPATIENT
Start: 2023-07-15 | End: 2023-07-15

## 2023-07-15 RX ADMIN — Medication 2 MILLIGRAM(S): at 05:22

## 2023-07-15 RX ADMIN — Medication 81 MILLIGRAM(S): at 13:42

## 2023-07-15 RX ADMIN — Medication 975 MILLIGRAM(S): at 05:23

## 2023-07-15 RX ADMIN — HEPARIN SODIUM 5000 UNIT(S): 5000 INJECTION INTRAVENOUS; SUBCUTANEOUS at 22:00

## 2023-07-15 RX ADMIN — Medication 50 MILLIGRAM(S): at 05:23

## 2023-07-15 RX ADMIN — CALAMINE AND ZINC OXIDE AND PHENOL 1 APPLICATION(S): 160; 10 LOTION TOPICAL at 19:26

## 2023-07-15 RX ADMIN — Medication 2 MILLIGRAM(S): at 00:07

## 2023-07-15 RX ADMIN — Medication 975 MILLIGRAM(S): at 06:04

## 2023-07-15 RX ADMIN — Medication 3 MILLILITER(S): at 05:22

## 2023-07-15 RX ADMIN — HEPARIN SODIUM 5000 UNIT(S): 5000 INJECTION INTRAVENOUS; SUBCUTANEOUS at 13:39

## 2023-07-15 RX ADMIN — Medication 2 MILLIGRAM(S): at 23:05

## 2023-07-15 RX ADMIN — Medication 975 MILLIGRAM(S): at 00:44

## 2023-07-15 RX ADMIN — Medication 3 MILLILITER(S): at 19:06

## 2023-07-15 RX ADMIN — Medication 975 MILLIGRAM(S): at 14:20

## 2023-07-15 RX ADMIN — Medication 1 DROP(S): at 19:26

## 2023-07-15 RX ADMIN — CALAMINE AND ZINC OXIDE AND PHENOL 1 APPLICATION(S): 160; 10 LOTION TOPICAL at 05:27

## 2023-07-15 RX ADMIN — Medication 975 MILLIGRAM(S): at 00:06

## 2023-07-15 RX ADMIN — NYSTATIN CREAM 1 APPLICATION(S): 100000 CREAM TOPICAL at 22:00

## 2023-07-15 RX ADMIN — Medication 975 MILLIGRAM(S): at 23:05

## 2023-07-15 RX ADMIN — Medication 15 MILLILITER(S): at 13:43

## 2023-07-15 RX ADMIN — Medication 3 MILLILITER(S): at 23:06

## 2023-07-15 RX ADMIN — Medication 975 MILLIGRAM(S): at 19:33

## 2023-07-15 RX ADMIN — Medication 2 MILLIGRAM(S): at 13:41

## 2023-07-15 RX ADMIN — NYSTATIN CREAM 1 APPLICATION(S): 100000 CREAM TOPICAL at 13:39

## 2023-07-15 RX ADMIN — LACOSAMIDE 200 MILLIGRAM(S): 50 TABLET ORAL at 06:13

## 2023-07-15 RX ADMIN — Medication 50 MILLIGRAM(S): at 19:06

## 2023-07-15 RX ADMIN — Medication 3 MILLILITER(S): at 00:07

## 2023-07-15 RX ADMIN — Medication 975 MILLIGRAM(S): at 13:39

## 2023-07-15 RX ADMIN — Medication 1 DROP(S): at 05:23

## 2023-07-15 RX ADMIN — Medication 975 MILLIGRAM(S): at 20:06

## 2023-07-15 RX ADMIN — HEPARIN SODIUM 5000 UNIT(S): 5000 INJECTION INTRAVENOUS; SUBCUTANEOUS at 05:27

## 2023-07-15 RX ADMIN — LACOSAMIDE 200 MILLIGRAM(S): 50 TABLET ORAL at 19:25

## 2023-07-15 RX ADMIN — Medication 2 MILLIGRAM(S): at 19:25

## 2023-07-15 RX ADMIN — PANTOPRAZOLE SODIUM 40 MILLIGRAM(S): 20 TABLET, DELAYED RELEASE ORAL at 06:13

## 2023-07-15 RX ADMIN — Medication 20 MILLIEQUIVALENT(S): at 10:40

## 2023-07-15 RX ADMIN — NYSTATIN CREAM 1 APPLICATION(S): 100000 CREAM TOPICAL at 05:22

## 2023-07-15 NOTE — PROGRESS NOTE ADULT - ASSESSMENT
53M w PMHx of HTN, type A aortic dissection s/p Dacron grafts, AV resuspension in 2013, CAD s/p CABG x 1 SVG to RCA (5/2013 with Dr. Medina), seizure disorder, recent admission 3/20-4/14 for replacement of transverse aortic arch, second stage TEVAR and aorto-axillary bypass, AV replacement (bio 23mm), and CABG x 1 (SVG-RCA). Pt found to have endo leak and taken to OR for TEVAR revision on 5/5. Post op course c/b Klebsiella bacteremia (5/15), resp failure requiring intubation on 5/18, Mucus plugging s/p Bronch 5/19 and PEA arrest. Post operatively pt also found to have hemorrhagic pancreatitis with vneu-pancreatic abscess possibly 2* to Depakote therefore s/p IR aspiration of peripancreatic fluid collection and paracentesis on 5/22, IR venu-pancreatic abscess drainage and placement of drainage catheter on 5/24. Pt then transferred from CTICU to SICU for septic shock, and further mgmt of hemorrhagic pancreatitis on 5/25. s/p IR placement of 2 new drainage catheters and catheter exchange on 5/26. LUQ drainage 5/30. OR 5/31 for exlap washout & replacement of 3 new JPs and abthera vac with open abdomen. RTOR 6/1, no bleeding, evac of old blood, placement of feeding J-tube, failed extubation due to PNA, completed ABX course and s/p trach 6/5 and decannulation 6/28. stepped down, but returned to SICU (7/2) for respiratory distress. Emergently intubated (7/3) for aspiration pneumonia, sputum cx grew pseudomonas. Extubated on 7/9.    Plan:  PT: PT/OT consult requested for early mobilization.   dispo: home vs COURT  f/u nutrition consult   SubqH/SCD  Pain control  Home meds   diet- mince and moist with tube feeds w/ ensures

## 2023-07-15 NOTE — PROGRESS NOTE ADULT - SUBJECTIVE AND OBJECTIVE BOX
Overnight events: No acute overnight events.       Procedures on this visit:    5/5: second stage TEVAR  5/13: IR Celiac, SMA, splenic, and left gastric artery angiogram reveals no pseudoaneurysm or any active bleeding.  5/22:  IR Aspiration of left peripancreatic fluid collection  (15cc sanguinous) and paracentesis (4L clear yellow fluid)  5/24: IR L peripancreatic abscess collection drainage and placement of 12F drainage catheter  5/25: Rosalee upsized existing abscess drain to 16F, placed new drain to another abscess collection on left, and two drain for ascites.  5/26: Placement of two new drainage catheters and exchange of two   drainage catheters  5/30: IR LUQ drainage of 400cc purulent fluid  5/31: ex lap, wash out of ascites/old blood/scant new blood, all CODIE drain removal, CODIE x3 placement, abdomen open,  6/1: No bleeding, evac of old blood, placement of feeding J-tube, abd closure  6/5: Bedside trach (Pulm)  6/9: Bronchoscopy, lavage    SUBJECTIVE:      MEDICATIONS  (STANDING):  acetaminophen   Oral Liquid .. 975 milliGRAM(s) Oral every 6 hours  albuterol/ipratropium for Nebulization 3 milliLiter(s) Nebulizer every 6 hours  artificial  tears Solution 1 Drop(s) Both EYES two times a day  aspirin  chewable 81 milliGRAM(s) Oral daily  calamine/zinc oxide Lotion 1 Application(s) Topical two times a day  dextrose 50% Injectable 25 Gram(s) IV Push once  dextrose 50% Injectable 25 Gram(s) IV Push once  dextrose 50% Injectable 12.5 Gram(s) IV Push once  dextrose Oral Gel 15 Gram(s) Oral once  glucagon  Injectable 1 milliGRAM(s) IntraMuscular once  heparin   Injectable 5000 Unit(s) SubCutaneous every 8 hours  lacosamide Solution 200 milliGRAM(s) Oral two times a day  loperamide Liquid 2 milliGRAM(s) Enteral Tube every 6 hours  metoprolol tartrate 50 milliGRAM(s) Oral every 12 hours  multivitamin/minerals/iron Oral Solution (CENTRUM) 15 milliLiter(s) Oral daily  nystatin Powder 1 Application(s) Topical every 8 hours  pantoprazole    Tablet 40 milliGRAM(s) Oral before breakfast    MEDICATIONS  (PRN):  melatonin Liquid 5 milliGRAM(s) Oral at bedtime PRN Sleep  ondansetron Injectable 4 milliGRAM(s) IV Push every 8 hours PRN Nausea and/or Vomiting  oxyCODONE    Solution 5 milliGRAM(s) Oral every 6 hours PRN breakthrough pain  sodium chloride 0.9% lock flush 10 milliLiter(s) IV Push every 1 hour PRN Pre/post blood products, medications, blood draw, and to maintain line patency      Vital Signs Last 24 Hrs  T(C): 37 (15 Jul 2023 04:36), Max: 37 (15 Jul 2023 04:36)  T(F): 98.6 (15 Jul 2023 04:36), Max: 98.6 (15 Jul 2023 04:36)  HR: 94 (15 Jul 2023 04:25) (82 - 96)  BP: 152/100 (15 Jul 2023 04:25) (152/100 - 172/114)  BP(mean): 121 (15 Jul 2023 04:25) (120 - 137)  RR: 17 (15 Jul 2023 04:25) (17 - 18)  SpO2: 95% (15 Jul 2023 04:25) (95% - 100%)    Parameters below as of 15 Jul 2023 04:25  Patient On (Oxygen Delivery Method): room air        Physical Exam:  General: NAD, resting comfortably in bed  Pulmonary: Nonlabored breathing, no respiratory distress  Cardiovascular: NSR  Abdominal: soft, NT/ND  Extremities: WWP, normal strength  Neuro: A/O x 3, CNs II-XII grossly intact, no focal deficits, normal motor/sensation  Pulses: palpable distal pulses    I&O's Summary    13 Jul 2023 07:01  -  14 Jul 2023 07:00  --------------------------------------------------------  IN: 485 mL / OUT: 500 mL / NET: -15 mL    14 Jul 2023 07:01  -  15 Jul 2023 06:59  --------------------------------------------------------  IN: 60 mL / OUT: 0 mL / NET: 60 mL        LABS:                        7.8    11.08 )-----------( 498      ( 15 Jul 2023 06:32 )             25.0     07-15    140  |  107  |  12  ----------------------------<  74  3.7   |  22  |  0.52    Ca    8.5      14 Jul 2023 06:07  Phos  4.2     07-15  Mg     2.0     07-15        Urinalysis Basic - ( 15 Jul 2023 06:32 )    Color: x / Appearance: x / SG: x / pH: x  Gluc: 74 mg/dL / Ketone: x  / Bili: x / Urobili: x   Blood: x / Protein: x / Nitrite: x   Leuk Esterase: x / RBC: x / WBC x   Sq Epi: x / Non Sq Epi: x / Bacteria: x      CAPILLARY BLOOD GLUCOSE            RADIOLOGY & ADDITIONAL STUDIES:   Overnight events: No acute overnight events.       Procedures on this visit:    5/5: second stage TEVAR  5/13: IR Celiac, SMA, splenic, and left gastric artery angiogram reveals no pseudoaneurysm or any active bleeding.  5/22:  IR Aspiration of left peripancreatic fluid collection  (15cc sanguinous) and paracentesis (4L clear yellow fluid)  5/24: IR L peripancreatic abscess collection drainage and placement of 12F drainage catheter  5/25: Rosalee upsized existing abscess drain to 16F, placed new drain to another abscess collection on left, and two drain for ascites.  5/26: Placement of two new drainage catheters and exchange of two   drainage catheters  5/30: IR LUQ drainage of 400cc purulent fluid  5/31: ex lap, wash out of ascites/old blood/scant new blood, all CODIE drain removal, CODIE x3 placement, abdomen open,  6/1: No bleeding, evac of old blood, placement of feeding J-tube, abd closure  6/5: Bedside trach (Pulm)  6/9: Bronchoscopy, lavage    SUBJECTIVE: Patient seen at bedside with chief resident. Patient denies any pain, nausea, or vomiting.       MEDICATIONS  (STANDING):  acetaminophen   Oral Liquid .. 975 milliGRAM(s) Oral every 6 hours  albuterol/ipratropium for Nebulization 3 milliLiter(s) Nebulizer every 6 hours  artificial  tears Solution 1 Drop(s) Both EYES two times a day  aspirin  chewable 81 milliGRAM(s) Oral daily  calamine/zinc oxide Lotion 1 Application(s) Topical two times a day  dextrose 50% Injectable 25 Gram(s) IV Push once  dextrose 50% Injectable 25 Gram(s) IV Push once  dextrose 50% Injectable 12.5 Gram(s) IV Push once  dextrose Oral Gel 15 Gram(s) Oral once  glucagon  Injectable 1 milliGRAM(s) IntraMuscular once  heparin   Injectable 5000 Unit(s) SubCutaneous every 8 hours  lacosamide Solution 200 milliGRAM(s) Oral two times a day  loperamide Liquid 2 milliGRAM(s) Enteral Tube every 6 hours  metoprolol tartrate 50 milliGRAM(s) Oral every 12 hours  multivitamin/minerals/iron Oral Solution (CENTRUM) 15 milliLiter(s) Oral daily  nystatin Powder 1 Application(s) Topical every 8 hours  pantoprazole    Tablet 40 milliGRAM(s) Oral before breakfast    MEDICATIONS  (PRN):  melatonin Liquid 5 milliGRAM(s) Oral at bedtime PRN Sleep  ondansetron Injectable 4 milliGRAM(s) IV Push every 8 hours PRN Nausea and/or Vomiting  oxyCODONE    Solution 5 milliGRAM(s) Oral every 6 hours PRN breakthrough pain  sodium chloride 0.9% lock flush 10 milliLiter(s) IV Push every 1 hour PRN Pre/post blood products, medications, blood draw, and to maintain line patency      Vital Signs Last 24 Hrs  T(C): 37 (15 Jul 2023 04:36), Max: 37 (15 Jul 2023 04:36)  T(F): 98.6 (15 Jul 2023 04:36), Max: 98.6 (15 Jul 2023 04:36)  HR: 94 (15 Jul 2023 04:25) (82 - 96)  BP: 152/100 (15 Jul 2023 04:25) (152/100 - 172/114)  BP(mean): 121 (15 Jul 2023 04:25) (120 - 137)  RR: 17 (15 Jul 2023 04:25) (17 - 18)  SpO2: 95% (15 Jul 2023 04:25) (95% - 100%)    Parameters below as of 15 Jul 2023 04:25  Patient On (Oxygen Delivery Method): room air        Physical Exam:  General: NAD,  Pulmonary: Nonlabored breathing, no respiratory distress  Cardiovascular: NSR  Abdominal: soft, NT/ND  Extremities: WWP, normal strength  Neuro: A/O x 3, CNs II-XII grossly intact, no focal deficits, normal motor/sensation  Pulses: palpable distal pulses    I&O's Summary    13 Jul 2023 07:01  -  14 Jul 2023 07:00  --------------------------------------------------------  IN: 485 mL / OUT: 500 mL / NET: -15 mL    14 Jul 2023 07:01  -  15 Jul 2023 06:59  --------------------------------------------------------  IN: 60 mL / OUT: 0 mL / NET: 60 mL        LABS:                        7.8    11.08 )-----------( 498      ( 15 Jul 2023 06:32 )             25.0     07-15    140  |  107  |  12  ----------------------------<  74  3.7   |  22  |  0.52    Ca    8.5      14 Jul 2023 06:07  Phos  4.2     07-15  Mg     2.0     07-15        Urinalysis Basic - ( 15 Jul 2023 06:32 )    Color: x / Appearance: x / SG: x / pH: x  Gluc: 74 mg/dL / Ketone: x  / Bili: x / Urobili: x   Blood: x / Protein: x / Nitrite: x   Leuk Esterase: x / RBC: x / WBC x   Sq Epi: x / Non Sq Epi: x / Bacteria: x      CAPILLARY BLOOD GLUCOSE            RADIOLOGY & ADDITIONAL STUDIES:

## 2023-07-16 DIAGNOSIS — I10 ESSENTIAL (PRIMARY) HYPERTENSION: ICD-10-CM

## 2023-07-16 PROCEDURE — 99255 IP/OBS CONSLTJ NEW/EST HI 80: CPT

## 2023-07-16 RX ORDER — LABETALOL HCL 100 MG
10 TABLET ORAL ONCE
Refills: 0 | Status: COMPLETED | OUTPATIENT
Start: 2023-07-16 | End: 2023-07-16

## 2023-07-16 RX ORDER — SODIUM CHLORIDE 9 MG/ML
500 INJECTION, SOLUTION INTRAVENOUS ONCE
Refills: 0 | Status: COMPLETED | OUTPATIENT
Start: 2023-07-16 | End: 2023-07-16

## 2023-07-16 RX ORDER — POTASSIUM CHLORIDE 20 MEQ
20 PACKET (EA) ORAL ONCE
Refills: 0 | Status: COMPLETED | OUTPATIENT
Start: 2023-07-16 | End: 2023-07-16

## 2023-07-16 RX ORDER — AMLODIPINE BESYLATE 2.5 MG/1
10 TABLET ORAL DAILY
Refills: 0 | Status: DISCONTINUED | OUTPATIENT
Start: 2023-07-16 | End: 2023-07-17

## 2023-07-16 RX ADMIN — HEPARIN SODIUM 5000 UNIT(S): 5000 INJECTION INTRAVENOUS; SUBCUTANEOUS at 05:44

## 2023-07-16 RX ADMIN — Medication 10 MILLIGRAM(S): at 10:03

## 2023-07-16 RX ADMIN — Medication 975 MILLIGRAM(S): at 13:00

## 2023-07-16 RX ADMIN — Medication 10 MILLIGRAM(S): at 12:14

## 2023-07-16 RX ADMIN — Medication 975 MILLIGRAM(S): at 05:43

## 2023-07-16 RX ADMIN — NYSTATIN CREAM 1 APPLICATION(S): 100000 CREAM TOPICAL at 05:47

## 2023-07-16 RX ADMIN — Medication 2 MILLIGRAM(S): at 05:46

## 2023-07-16 RX ADMIN — Medication 15 MILLILITER(S): at 12:16

## 2023-07-16 RX ADMIN — Medication 975 MILLIGRAM(S): at 06:00

## 2023-07-16 RX ADMIN — LACOSAMIDE 200 MILLIGRAM(S): 50 TABLET ORAL at 05:46

## 2023-07-16 RX ADMIN — OXYCODONE HYDROCHLORIDE 5 MILLIGRAM(S): 5 TABLET ORAL at 17:50

## 2023-07-16 RX ADMIN — HEPARIN SODIUM 5000 UNIT(S): 5000 INJECTION INTRAVENOUS; SUBCUTANEOUS at 12:17

## 2023-07-16 RX ADMIN — Medication 2 MILLIGRAM(S): at 18:33

## 2023-07-16 RX ADMIN — SODIUM CHLORIDE 1000 MILLILITER(S): 9 INJECTION, SOLUTION INTRAVENOUS at 19:47

## 2023-07-16 RX ADMIN — Medication 3 MILLILITER(S): at 23:29

## 2023-07-16 RX ADMIN — Medication 2 MILLIGRAM(S): at 23:29

## 2023-07-16 RX ADMIN — NYSTATIN CREAM 1 APPLICATION(S): 100000 CREAM TOPICAL at 12:17

## 2023-07-16 RX ADMIN — Medication 975 MILLIGRAM(S): at 23:29

## 2023-07-16 RX ADMIN — Medication 50 MILLIGRAM(S): at 05:44

## 2023-07-16 RX ADMIN — OXYCODONE HYDROCHLORIDE 5 MILLIGRAM(S): 5 TABLET ORAL at 07:53

## 2023-07-16 RX ADMIN — Medication 3 MILLILITER(S): at 12:15

## 2023-07-16 RX ADMIN — AMLODIPINE BESYLATE 10 MILLIGRAM(S): 2.5 TABLET ORAL at 19:54

## 2023-07-16 RX ADMIN — Medication 3 MILLILITER(S): at 05:44

## 2023-07-16 RX ADMIN — HEPARIN SODIUM 5000 UNIT(S): 5000 INJECTION INTRAVENOUS; SUBCUTANEOUS at 21:10

## 2023-07-16 RX ADMIN — Medication 20 MILLIEQUIVALENT(S): at 12:16

## 2023-07-16 RX ADMIN — Medication 2 MILLIGRAM(S): at 12:16

## 2023-07-16 RX ADMIN — LACOSAMIDE 200 MILLIGRAM(S): 50 TABLET ORAL at 18:29

## 2023-07-16 RX ADMIN — Medication 975 MILLIGRAM(S): at 12:14

## 2023-07-16 RX ADMIN — Medication 50 MILLIGRAM(S): at 17:50

## 2023-07-16 RX ADMIN — Medication 1 DROP(S): at 05:44

## 2023-07-16 RX ADMIN — Medication 10 MILLIGRAM(S): at 19:53

## 2023-07-16 RX ADMIN — Medication 1 DROP(S): at 18:29

## 2023-07-16 RX ADMIN — Medication 975 MILLIGRAM(S): at 17:49

## 2023-07-16 RX ADMIN — CALAMINE AND ZINC OXIDE AND PHENOL 1 APPLICATION(S): 160; 10 LOTION TOPICAL at 18:34

## 2023-07-16 RX ADMIN — OXYCODONE HYDROCHLORIDE 5 MILLIGRAM(S): 5 TABLET ORAL at 18:00

## 2023-07-16 RX ADMIN — Medication 975 MILLIGRAM(S): at 00:00

## 2023-07-16 RX ADMIN — OXYCODONE HYDROCHLORIDE 5 MILLIGRAM(S): 5 TABLET ORAL at 05:32

## 2023-07-16 RX ADMIN — Medication 3 MILLILITER(S): at 17:50

## 2023-07-16 RX ADMIN — NYSTATIN CREAM 1 APPLICATION(S): 100000 CREAM TOPICAL at 21:10

## 2023-07-16 RX ADMIN — PANTOPRAZOLE SODIUM 40 MILLIGRAM(S): 20 TABLET, DELAYED RELEASE ORAL at 07:52

## 2023-07-16 RX ADMIN — CALAMINE AND ZINC OXIDE AND PHENOL 1 APPLICATION(S): 160; 10 LOTION TOPICAL at 05:43

## 2023-07-16 RX ADMIN — Medication 975 MILLIGRAM(S): at 18:00

## 2023-07-16 RX ADMIN — Medication 81 MILLIGRAM(S): at 12:16

## 2023-07-16 NOTE — CONSULT NOTE ADULT - ASSESSMENT
53M w PMHx of HTN, type A aortic dissection s/p Dacron grafts, AV resuspension in 2013, CAD s/p CABG x 1 SVG to RCA (5/2013 with Dr. Medina), seizure disorder, recent admission 3/20-4/14 for replacement of transverse aortic arch, second stage TEVAR and aorto-axillary bypass, AV replacement (bio 23mm), and CABG x 1 (SVG-RCA), TEVAR revision on 5/5 with course complicated by Klebsiella bacteremia (5/15), resp failure requiring intubation on 5/18, Mucus plugging s/p Bronch 5/19 and PEA arrest,  hemorrhagic pancreatitis, Exlap with washout, AHRF requiring intubation secondary to aspiration pneumonia from pseudomonas who medicine consult service was consulted for blood pressure management.       # Hypertensive Urgency: Patient reports that at home he is on triple therapy with Amlodipine 10 mg PO daily, Losartan 100 mg PO daily, and Atenolol 100 mg PO BID. Patient reports that his blood pressure in the outpatient setting prior to this admission was 120s/70s. Patient denies causes of reversible HTN including pain, nausea, emotion, urinary retention. Patient feels comfortable at the current time, denies chest pain, shortness of breath, headache, changes in mental status. Current medications of Metoprolol Tartrate 50 mg PO BID. Patient also received two pushes of Labetalol 10 mg IV with minimal improvement.     - Patient with no clinical symptoms of HTN Emergency but continue to monitor for symptoms.  - Can restart Losartan 100 mg PO daily and Amlodipine 10 mg PO daily, losartan will have quicker onset of action while Amlodipine may take a few days to take full effect.   - Can also discuss transition from Metoprolol to home Atenolol dosing unless clinical reason for switch.  - Continue to monitor for reversible causes of HTN, pain control appropriate at this time.   - Follow up bladder scan findings.       *****Incomplete pending discussion with attending physician.    53M w PMHx of HTN, type A aortic dissection s/p Dacron grafts, AV resuspension in 2013, CAD s/p CABG x 1 SVG to RCA (5/2013 with Dr. Medina), seizure disorder, recent admission 3/20-4/14 for replacement of transverse aortic arch, second stage TEVAR and aorto-axillary bypass, AV replacement (bio 23mm), and CABG x 1 (SVG-RCA), TEVAR revision on 5/5 with course complicated by Klebsiella bacteremia (5/15), resp failure requiring intubation on 5/18, Mucus plugging s/p Bronch 5/19 and PEA arrest,  hemorrhagic pancreatitis, Exlap with washout, AHRF requiring intubation secondary to aspiration pneumonia from pseudomonas who medicine consult service was consulted for blood pressure management.     # Hypertensive Urgency: Patient reports that at home he is on triple therapy with Amlodipine 10 mg PO daily, Losartan 100 mg PO daily, and Atenolol 100 mg PO BID. Patient reports that his blood pressure in the outpatient setting prior to this admission was 120s/70s. Patient denies causes of reversible HTN including pain, nausea, emotion, urinary retention. Patient feels comfortable at the current time, denies chest pain, shortness of breath, headache, changes in mental status. Current medications of Metoprolol Tartrate 50 mg PO BID. Patient also received two pushes of Labetalol 10 mg IV with minimal improvement. TEVAR blood pressure goals < 160s, wiht    - Patient with no clinical symptoms of HTN Emergency but continue to monitor for symptoms, can give Labetalol 10 mg IVP PRN, would hold off unless sustained SBP > 180 for 1-2.   - Can restart Losartan 100 mg PO daily tonight, can start Amlodipine 10 mg PO daily tomorrow at 10-11AM., losartan will have quicker onset of action while Amlodipine may take a few days to take full effect.   - Can also discuss transition from Metoprolol to home Atenolol dosing unless clinical reason for switch.  - Continue to monitor for reversible causes of HTN, pain control appropriate at this time.   - Follow up bladder scan findings.       *****Incomplete pending discussion with attending physician.    53M w PMHx of HTN, type A aortic dissection s/p Dacron grafts, AV resuspension in 2013, CAD s/p CABG x 1 SVG to RCA (5/2013 with Dr. Medina), seizure disorder, recent admission 3/20-4/14 for replacement of transverse aortic arch, second stage TEVAR and aorto-axillary bypass, AV replacement (bio 23mm), and CABG x 1 (SVG-RCA), TEVAR revision on 5/5 with course complicated by Klebsiella bacteremia (5/15), resp failure requiring intubation on 5/18, Mucus plugging s/p Bronch 5/19 and PEA arrest,  hemorrhagic pancreatitis, Exlap with washout, AHRF requiring intubation secondary to aspiration pneumonia from pseudomonas who medicine consult service was consulted for blood pressure management.     # Hypertensive Urgency: Patient reports that at home he is on triple therapy with Amlodipine 10 mg PO daily, Losartan 100 mg PO daily, and Atenolol 100 mg PO BID. Patient reports that his blood pressure in the outpatient setting prior to this admission was 120s/70s. Patient denies causes of reversible HTN including pain, nausea, emotion, urinary retention. Patient feels comfortable at the current time, denies chest pain, shortness of breath, headache, changes in mental status. Current medications of Metoprolol Tartrate 50 mg PO BID. Patient also received two pushes of Labetalol 10 mg IV with minimal improvement. TEVAR blood pressure goals < 140 systolic.    - Patient with no clinical symptoms of HTN Emergency but continue to monitor for symptoms, can give Labetalol 10 mg IVP PRN, would hold off unless sustained SBP > 180 for 1-2 hours.   - Can restart Losartan 100 mg PO daily tonight, can start Amlodipine 10 mg PO daily tomorrow at 10-11AM.   - Can also discuss transition from Metoprolol to home Atenolol dosing unless clinical reason for switch.  - Continue to monitor for reversible causes of HTN, pain control appropriate at this time.

## 2023-07-16 NOTE — CONSULT NOTE ADULT - SUBJECTIVE AND OBJECTIVE BOX
Consultation Requested by:    Patient is a 53y old  Male who presents with a chief complaint of endoleak, abdominal pain (16 Jul 2023 16:35)    HPI:  54 YO Male, current every day marijuana use, w/ PMHx of HTN, type A aortic dissection s/p Dacron grafts, AV resuspension in 2013, CAD s/p CABG x 1 SVG to RCA (5/2013 with Dr. Medina), seizure disorder (last episode on 7/4/22) who was admitted for surgical mgmt of progression of aneurysmal disease. Recent admission 3/20-4/14 during which patient underwent replacement of transverse aortic arch, second stage thoracic endovascular aortic repair, aorto-axillary bypass, AV replacement (bio 23mm), and CABG x 1 (SVG-RCA) EF 75% (Ignacio, 3/20). Post op cb lactic acidosis, severe cardiogenic and vasogenic shock, TREY requiring CVVHD and temporary dialysis requirement. Patient presented to [facility] ED 4/27 for syncope vs seizure episode at home, falling onto back of head. Nuerological workup proformed during that visit revealed a negative EEG, but given clinical picture of seizures, pt started on vimpat and depakote. Imaging preformed during that admission did no require acute surgical intervention on endoleak.   Pt presented to [facility] ED last night complaning of worsening epigastric pain. CTAP revealed continued endoleak. Sent to [facility] for further evaluation. Pt will be taken to the OR with Dr. Pierre this morning for a completion TEVAR. Pt consented, OR aware.     Post op course c/b Klebsiella bacteremia (5/15), resp failure requiring intubation on 5/18, Mucus plugging s/p Bronch 5/19 and PEA arrest. Post operatively pt also found to have hemorrhagic pancreatitis with venu-pancreatic abscess possibly 2* to Depakote therefore s/p IR aspiration of peripancreatic fluid collection and paracentesis on 5/22, IR venu-pancreatic abscess drainage and placement of drainage catheter on 5/24. Pt then transferred from CTICU to SICU for septic shock, and further mgmt of hemorrhagic pancreatitis on 5/25. s/p IR placement of 2 new drainage catheters and catheter exchange on 5/26. LUQ drainage 5/30. OR 5/31 for exlap washout & replacement of 3 new JPs and abthera vac with open abdomen. RTOR 6/1, no bleeding, evac of old blood, placement of feeding J-tube, failed extubation due to PNA, completed ABX course and s/p trach 6/5 and decannulation 6/28. stepped down, but returned to SICU (7/2) for respiratory distress. Emergently intubated (7/3) for aspiration pneumonia, sputum cx grew pseudomonas. Extubated on 7/9.    Interval History: Patient noted to have increasing blood pressure over the previous number of days without significant control on current regimen. Patient reports that at home he is on triple therapy with Amlodipine 10 mg PO daily, Losartan 100 mg PO daily, and Atenolol 100 mg PO BID. Patient reports that his blood pressure in the outpatient setting prior to this admission was 120s/70s. Patient denies causes of reversible HTN including pain, nausea, emotion, urinary retention. Patient feels comfortable at the current time, denies chest pain, shortness of breath, headache, changes in mental status.       Allergies    No Known Allergies    Intolerances      Antimicrobials:      Other Medications:  acetaminophen   Oral Liquid .. 975 milliGRAM(s) Oral every 6 hours  albuterol/ipratropium for Nebulization 3 milliLiter(s) Nebulizer every 6 hours  artificial  tears Solution 1 Drop(s) Both EYES two times a day  aspirin  chewable 81 milliGRAM(s) Oral daily  calamine/zinc oxide Lotion 1 Application(s) Topical two times a day  dextrose 50% Injectable 12.5 Gram(s) IV Push once  dextrose 50% Injectable 25 Gram(s) IV Push once  dextrose 50% Injectable 25 Gram(s) IV Push once  dextrose Oral Gel 15 Gram(s) Oral once  glucagon  Injectable 1 milliGRAM(s) IntraMuscular once  heparin   Injectable 5000 Unit(s) SubCutaneous every 8 hours  lacosamide Solution 200 milliGRAM(s) Oral two times a day  loperamide Liquid 2 milliGRAM(s) Enteral Tube every 6 hours  melatonin Liquid 5 milliGRAM(s) Oral at bedtime PRN  metoprolol tartrate 50 milliGRAM(s) Oral every 12 hours  multivitamin/minerals/iron Oral Solution (CENTRUM) 15 milliLiter(s) Oral daily  nystatin Powder 1 Application(s) Topical every 8 hours  ondansetron Injectable 4 milliGRAM(s) IV Push every 8 hours PRN  oxyCODONE    Solution 5 milliGRAM(s) Oral every 6 hours PRN  pantoprazole    Tablet 40 milliGRAM(s) Oral before breakfast  sodium chloride 0.9% lock flush 10 milliLiter(s) IV Push every 1 hour PRN      FAMILY HISTORY:  No pertinent family history in first degree relatives      PAST MEDICAL & SURGICAL HISTORY:  HTN (hypertension)      Aortic dissection      CAD (coronary artery disease)      Seizure disorder      Seizure disorder      Hypertension      Status post endovascular aneurysm repair (EVAR)      S/P aortic bifurcation bypass graft      S/P CABG x 1     VITAL SIGNS:  T(C): 36.4 (07-16-23 @ 17:23), Max: 36.8 (07-15-23 @ 21:40)  T(F): 97.6 (07-16-23 @ 17:23), Max: 98.2 (07-15-23 @ 21:40)  HR: 98 (07-16-23 @ 15:58) (82 - 98)  BP: 163/118 (07-16-23 @ 15:58) (163/118 - 187/114)  BP(mean): 136 (07-16-23 @ 15:58) (128 - 143)  RR: 18 (07-16-23 @ 15:58) (17 - 18)  SpO2: 95% (07-16-23 @ 15:58) (93% - 100%)  Wt(kg): --    PHYSICAL EXAM:  Constitutional: Patient is in no acute distress, resting comfortably in bed.  HEENT: Atraumatic/normocephalic. anicteric sclera, mucous membranes moist.   Respiratory: Clear to auscultation bilaterally; no Wheezing/Crackles/Ronchi, no accessory muscle use.   Cardiac: Regular rate and rhythm, S1/S2; no Murmur/Rub/Gallop;   Gastrointestinal: abdomen soft, non-tender and non-distended; Normoactive bowel sounds.   Extremities: Warm and Well perfused.  Musculoskeletal: Normal ROM in upper and lower extremities; no joint swelling, tenderness or erythema  Vascular: 2+ radial, Dorsalis pedis and posterior tibial pulses bilaterally.  Neurologic: AAOx3; no focal deficits    Lab Results:                        7.8    11.08 )-----------( 498      ( 15 Jul 2023 06:32 )             25.0     07-15    140  |  107  |  12  ----------------------------<  74  3.7   |  22  |  0.52    Ca    8.1<L>      15 Jul 2023 06:32  Phos  4.2     07-15  Mg     2.0     07-15          Urinalysis Basic - ( 15 Jul 2023 06:32 )    Color: x / Appearance: x / SG: x / pH: x  Gluc: 74 mg/dL / Ketone: x  / Bili: x / Urobili: x   Blood: x / Protein: x / Nitrite: x   Leuk Esterase: x / RBC: x / WBC x   Sq Epi: x / Non Sq Epi: x / Bacteria: x

## 2023-07-16 NOTE — PROGRESS NOTE ADULT - SUBJECTIVE AND OBJECTIVE BOX
SUBJECTIVE:  Patient was seen at bedside this AM. Patient denies pain currently. Denies nausea and vomiting. Confirms tolerating small amount of diet. Confirms flatus and BMx1. Has not ambulated as PT did not come by patient's room yesterday    MEDICATIONS  (STANDING):  acetaminophen   Oral Liquid .. 975 milliGRAM(s) Oral every 6 hours  albuterol/ipratropium for Nebulization 3 milliLiter(s) Nebulizer every 6 hours  artificial  tears Solution 1 Drop(s) Both EYES two times a day  aspirin  chewable 81 milliGRAM(s) Oral daily  calamine/zinc oxide Lotion 1 Application(s) Topical two times a day  dextrose 50% Injectable 25 Gram(s) IV Push once  dextrose 50% Injectable 25 Gram(s) IV Push once  dextrose 50% Injectable 12.5 Gram(s) IV Push once  dextrose Oral Gel 15 Gram(s) Oral once  glucagon  Injectable 1 milliGRAM(s) IntraMuscular once  heparin   Injectable 5000 Unit(s) SubCutaneous every 8 hours  lacosamide Solution 200 milliGRAM(s) Oral two times a day  loperamide Liquid 2 milliGRAM(s) Enteral Tube every 6 hours  metoprolol tartrate 50 milliGRAM(s) Oral every 12 hours  multivitamin/minerals/iron Oral Solution (CENTRUM) 15 milliLiter(s) Oral daily  nystatin Powder 1 Application(s) Topical every 8 hours  pantoprazole    Tablet 40 milliGRAM(s) Oral before breakfast    MEDICATIONS  (PRN):  melatonin Liquid 5 milliGRAM(s) Oral at bedtime PRN Sleep  ondansetron Injectable 4 milliGRAM(s) IV Push every 8 hours PRN Nausea and/or Vomiting  oxyCODONE    Solution 5 milliGRAM(s) Oral every 6 hours PRN breakthrough pain  sodium chloride 0.9% lock flush 10 milliLiter(s) IV Push every 1 hour PRN Pre/post blood products, medications, blood draw, and to maintain line patency      Vital Signs Last 24 Hrs  T(C): 36.5 (16 Jul 2023 10:00), Max: 37.2 (15 Jul 2023 17:25)  T(F): 97.7 (16 Jul 2023 10:00), Max: 98.9 (15 Jul 2023 17:25)  HR: 98 (16 Jul 2023 15:58) (82 - 98)  BP: 163/118 (16 Jul 2023 15:58) (163/118 - 187/114)  BP(mean): 136 (16 Jul 2023 15:58) (128 - 143)  RR: 18 (16 Jul 2023 15:58) (17 - 18)  SpO2: 95% (16 Jul 2023 15:58) (93% - 100%)    Parameters below as of 16 Jul 2023 15:58  Patient On (Oxygen Delivery Method): room air        Physical Exam:  General: NAD, resting comfortably in bed  Pulmonary: Nonlabored breathing, no respiratory distress  Cardiovascular: NSR  Abdominal: soft, NT/ND  Extremities: WWP, normal strength  Neuro: A/O x 3, CNs II-XII grossly intact, no focal deficits, normal motor/sensation  Pulses: palpable distal pulses    I&O's Summary    15 Jul 2023 07:01  -  16 Jul 2023 07:00  --------------------------------------------------------  IN: 911 mL / OUT: 1000 mL / NET: -89 mL    16 Jul 2023 07:01  -  16 Jul 2023 16:36  --------------------------------------------------------  IN: 0 mL / OUT: 50 mL / NET: -50 mL        LABS:                        7.8    11.08 )-----------( 498      ( 15 Jul 2023 06:32 )             25.0     07-15    140  |  107  |  12  ----------------------------<  74  3.7   |  22  |  0.52    Ca    8.1<L>      15 Jul 2023 06:32  Phos  4.2     07-15  Mg     2.0     07-15        Urinalysis Basic - ( 15 Jul 2023 06:32 )    Color: x / Appearance: x / SG: x / pH: x  Gluc: 74 mg/dL / Ketone: x  / Bili: x / Urobili: x   Blood: x / Protein: x / Nitrite: x   Leuk Esterase: x / RBC: x / WBC x   Sq Epi: x / Non Sq Epi: x / Bacteria: x      CAPILLARY BLOOD GLUCOSE            RADIOLOGY & ADDITIONAL STUDIES:

## 2023-07-16 NOTE — CONSULT NOTE ADULT - ATTENDING COMMENTS
Acute respiratory failure with unable to wean from vent, failed extubation. Plan for tracheostomy. Labs, radiology and SICU notes were reviewed. Rest as above,
Late entry. ACS attending. Patient seen today and followed by general surgery. Note also patient followed by severe pancreatitis task force and we have involved [facility] surgery Dr. Tamez for reconsultation. Now s/p IR angiogram without evidence of active arterial bleeding. Patient with improved hemodynamics after acute resuscitation. Abdomen is softly distended. Patient is high risk of rebleed and high risk of abdominal compartment syndrome in this acute ill patient. Keep warm and resuscitated. Recommend follow q 3-5 hour CBC at this time to follow trend as patient Given improvement recommend continued supportive care as per CTICU. If evidence of rebleed would re-activate pancreatitis task force - Dr. Tamez/ACS/and IR colleagues.
I agree with the fellow's findings and plans as written above with the following additions/amendments:    Seen and examined at bedside multiple times throughout day, discussed at length with primary team. Despite paracentesis patient with minimal urine output and rising sCr, critically ill, rising oxygen requirements, would consider CVVHD if access can be placed safely. Monitoring closely. Further recs as above. Discussed with primary team
On exam he is awake, alert, oriented x 3, following commands with all 4 extremities, moving all extremities symmetrically with good strength.  Complains of chest pain but otherwise feeling well.  I do not suspect seizures or any other neurological process at this time.  Epilepsy will sign off; please call with any additional questions or concerns.
Patient is a 53 year old gentleman with history of HTN, Type A aortic dissection, CAD s/p CABG 2013, seizure disorder who was admitted s/p TEVAR 5/5/23 who postoperatively developed leukocytosis. CT imaging was ordered and patient was incidentally found to have diffuse pancreatic enlargement and heterogeneous attenuation in the body and tail with multiple peripancreatic and perigastric fluid collections with wall enhancement. Surgery consulted for further evaluation. No previous episodes or history of pancreatitis noted, denies EtOH use, or history of gallstone disease. At time of evaluation, afebrile, hemodynamically stable, abdomen soft, non tender. Lipase elevated, CT imaging reviewed. Given clinical status, no indication for endoscopic/percutaneous/surgical intervention at this time. No role for antibiotics, interval surveillance imaging to assess evolution of pancreatic fluid collections. Late entry, date of service 5/8/23
Pt seen and d/w fellow.  Collections likely related to ischemic insult from admission in March/ April.  No abdominal pain.  Pt should f/u as an outpt for further imaging and to monitor for resolution.  No plan for drainage currently.
52 yo M with PMHx HTN, Type A aortic dissection (s/p Dacron grafts), AV resuspension (2013), CAD (s/p CABG, 2013), seizure disorder, s/p TEVAR (3/2023) admitted to surgical service since 5/5 for TEVAR revision with hospital course c/b bacteremia, PEA arrest, hemorrhagic pancreatitis now stable. Medicine consulted on 7/16 for elevated blood pressures.    #HTN urgency - Hx of HTN on home regimen, has not yet been restarted inpatient. Not in pain or acute distress. Denies headache, vision changes, chest pain, SOB. Would F/U bladder scan to ensure no urinary retention as pt with decreased UOP per bedside RN and on my exam with suprapubic fullness to palpation. Resume home Losartan 100mg Q24 and can stagger home Amlodipine 10mg in AM if BP tolerates. Avoid PRN IV Labetalol unless sustained sBP >180s without other etiologies for elevated BP.     Agree with remainder of resident plan as above.
Probable seizure in the post-operative/acutely ill state.  Possible seizure  focus related to Cardiothoracic issues in the past.  Pt currently on dextomidate but minimally responsive to touch.  Recommend continuing vEEG and following levels as may need to modify.  MRI brain to be considered if possible.

## 2023-07-16 NOTE — PROGRESS NOTE ADULT - ASSESSMENT
53M w PMHx of HTN, type A aortic dissection s/p Dacron grafts, AV resuspension in 2013, CAD s/p CABG x 1 SVG to RCA (5/2013 with Dr. Medina), seizure disorder, recent admission 3/20-4/14 for replacement of transverse aortic arch, second stage TEVAR and aorto-axillary bypass, AV replacement (bio 23mm), and CABG x 1 (SVG-RCA). Pt found to have endo leak and taken to OR for TEVAR revision on 5/5. Post op course c/b Klebsiella bacteremia (5/15), resp failure requiring intubation on 5/18, Mucus plugging s/p Bronch 5/19 and PEA arrest. Post operatively pt also found to have hemorrhagic pancreatitis with venu-pancreatic abscess possibly 2* to Depakote therefore s/p IR aspiration of peripancreatic fluid collection and paracentesis on 5/22, IR venu-pancreatic abscess drainage and placement of drainage catheter on 5/24. Pt then transferred from CTICU to SICU for septic shock, and further mgmt of hemorrhagic pancreatitis on 5/25. s/p IR placement of 2 new drainage catheters and catheter exchange on 5/26. LUQ drainage 5/30. OR 5/31 for exlap washout & replacement of 3 new JPs and abthera vac with open abdomen. RTOR 6/1, no bleeding, evac of old blood, placement of feeding J-tube, failed extubation due to PNA, completed ABX course and s/p trach 6/5 and decannulation 6/28. stepped down, but returned to SICU (7/2) for respiratory distress. Emergently intubated (7/3) for aspiration pneumonia, sputum cx grew pseudomonas. Extubated on 7/9.    Plan:  PT: PT/OT consult requested for early mobilization.   F/up In-patient rehab per   f/u calorie count  F/up hospitalist for BP management  SubqH/SCD  Pain control  Home meds   diet- mince and moist with tube feeds w/ ensures

## 2023-07-17 LAB
ANION GAP SERPL CALC-SCNC: 12 MMOL/L — SIGNIFICANT CHANGE UP (ref 5–17)
APTT BLD: 27.5 SEC — SIGNIFICANT CHANGE UP (ref 27.5–35.5)
APTT BLD: 85.5 SEC — HIGH (ref 27.5–35.5)
BLD GP AB SCN SERPL QL: NEGATIVE — SIGNIFICANT CHANGE UP
BUN SERPL-MCNC: 15 MG/DL — SIGNIFICANT CHANGE UP (ref 7–23)
CALCIUM SERPL-MCNC: 8.2 MG/DL — LOW (ref 8.4–10.5)
CHLORIDE SERPL-SCNC: 106 MMOL/L — SIGNIFICANT CHANGE UP (ref 96–108)
CO2 SERPL-SCNC: 19 MMOL/L — LOW (ref 22–31)
CREAT SERPL-MCNC: 0.62 MG/DL — SIGNIFICANT CHANGE UP (ref 0.5–1.3)
EGFR: 114 ML/MIN/1.73M2 — SIGNIFICANT CHANGE UP
GLUCOSE SERPL-MCNC: 119 MG/DL — HIGH (ref 70–99)
HCT VFR BLD CALC: 28.4 % — LOW (ref 39–50)
HGB BLD-MCNC: 8.8 G/DL — LOW (ref 13–17)
INR BLD: 1.38 — HIGH (ref 0.88–1.16)
LACTATE SERPL-SCNC: 1.1 MMOL/L — SIGNIFICANT CHANGE UP (ref 0.5–2)
MAGNESIUM SERPL-MCNC: 1.8 MG/DL — SIGNIFICANT CHANGE UP (ref 1.6–2.6)
MCHC RBC-ENTMCNC: 29.4 PG — SIGNIFICANT CHANGE UP (ref 27–34)
MCHC RBC-ENTMCNC: 31 GM/DL — LOW (ref 32–36)
MCV RBC AUTO: 95 FL — SIGNIFICANT CHANGE UP (ref 80–100)
NRBC # BLD: 0 /100 WBCS — SIGNIFICANT CHANGE UP (ref 0–0)
PHOSPHATE SERPL-MCNC: 4.8 MG/DL — HIGH (ref 2.5–4.5)
PLATELET # BLD AUTO: 204 K/UL — SIGNIFICANT CHANGE UP (ref 150–400)
POTASSIUM SERPL-MCNC: 4 MMOL/L — SIGNIFICANT CHANGE UP (ref 3.5–5.3)
POTASSIUM SERPL-SCNC: 4 MMOL/L — SIGNIFICANT CHANGE UP (ref 3.5–5.3)
PROTHROM AB SERPL-ACNC: 16.5 SEC — HIGH (ref 10.5–13.4)
RBC # BLD: 2.99 M/UL — LOW (ref 4.2–5.8)
RBC # FLD: 17.6 % — HIGH (ref 10.3–14.5)
RH IG SCN BLD-IMP: POSITIVE — SIGNIFICANT CHANGE UP
SODIUM SERPL-SCNC: 137 MMOL/L — SIGNIFICANT CHANGE UP (ref 135–145)
WBC # BLD: 12 K/UL — HIGH (ref 3.8–10.5)
WBC # FLD AUTO: 12 K/UL — HIGH (ref 3.8–10.5)

## 2023-07-17 PROCEDURE — 99233 SBSQ HOSP IP/OBS HIGH 50: CPT

## 2023-07-17 PROCEDURE — 71275 CT ANGIOGRAPHY CHEST: CPT | Mod: 26

## 2023-07-17 PROCEDURE — 74177 CT ABD & PELVIS W/CONTRAST: CPT | Mod: 26

## 2023-07-17 PROCEDURE — 71045 X-RAY EXAM CHEST 1 VIEW: CPT | Mod: 26

## 2023-07-17 RX ORDER — HEPARIN SODIUM 5000 [USP'U]/ML
1500 INJECTION INTRAVENOUS; SUBCUTANEOUS
Qty: 25000 | Refills: 0 | Status: DISCONTINUED | OUTPATIENT
Start: 2023-07-17 | End: 2023-07-18

## 2023-07-17 RX ORDER — FUROSEMIDE 40 MG
20 TABLET ORAL ONCE
Refills: 0 | Status: COMPLETED | OUTPATIENT
Start: 2023-07-17 | End: 2023-07-17

## 2023-07-17 RX ORDER — ACETAMINOPHEN 500 MG
1000 TABLET ORAL ONCE
Refills: 0 | Status: COMPLETED | OUTPATIENT
Start: 2023-07-18 | End: 2023-07-17

## 2023-07-17 RX ORDER — FUROSEMIDE 40 MG
40 TABLET ORAL ONCE
Refills: 0 | Status: COMPLETED | OUTPATIENT
Start: 2023-07-17 | End: 2023-07-17

## 2023-07-17 RX ORDER — METOPROLOL TARTRATE 50 MG
5 TABLET ORAL EVERY 6 HOURS
Refills: 0 | Status: DISCONTINUED | OUTPATIENT
Start: 2023-07-17 | End: 2023-07-19

## 2023-07-17 RX ORDER — PANTOPRAZOLE SODIUM 20 MG/1
40 TABLET, DELAYED RELEASE ORAL DAILY
Refills: 0 | Status: DISCONTINUED | OUTPATIENT
Start: 2023-07-18 | End: 2023-07-19

## 2023-07-17 RX ORDER — ACETAMINOPHEN 500 MG
1000 TABLET ORAL ONCE
Refills: 0 | Status: COMPLETED | OUTPATIENT
Start: 2023-07-18 | End: 2023-07-18

## 2023-07-17 RX ORDER — HEPARIN SODIUM 5000 [USP'U]/ML
INJECTION INTRAVENOUS; SUBCUTANEOUS
Qty: 25000 | Refills: 0 | Status: DISCONTINUED | OUTPATIENT
Start: 2023-07-17 | End: 2023-07-17

## 2023-07-17 RX ORDER — SODIUM CHLORIDE 9 MG/ML
1000 INJECTION, SOLUTION INTRAVENOUS
Refills: 0 | Status: DISCONTINUED | OUTPATIENT
Start: 2023-07-17 | End: 2023-07-17

## 2023-07-17 RX ORDER — ACETAMINOPHEN 500 MG
1000 TABLET ORAL ONCE
Refills: 0 | Status: COMPLETED | OUTPATIENT
Start: 2023-07-17 | End: 2023-07-17

## 2023-07-17 RX ORDER — HYDROMORPHONE HYDROCHLORIDE 2 MG/ML
0.5 INJECTION INTRAMUSCULAR; INTRAVENOUS; SUBCUTANEOUS EVERY 6 HOURS
Refills: 0 | Status: DISCONTINUED | OUTPATIENT
Start: 2023-07-17 | End: 2023-07-19

## 2023-07-17 RX ORDER — LACOSAMIDE 50 MG/1
200 TABLET ORAL EVERY 12 HOURS
Refills: 0 | Status: DISCONTINUED | OUTPATIENT
Start: 2023-07-17 | End: 2023-07-19

## 2023-07-17 RX ADMIN — Medication 3 MILLILITER(S): at 23:14

## 2023-07-17 RX ADMIN — NYSTATIN CREAM 1 APPLICATION(S): 100000 CREAM TOPICAL at 16:19

## 2023-07-17 RX ADMIN — Medication 40 MILLIGRAM(S): at 05:58

## 2023-07-17 RX ADMIN — HEPARIN SODIUM 1700 UNIT(S)/HR: 5000 INJECTION INTRAVENOUS; SUBCUTANEOUS at 15:25

## 2023-07-17 RX ADMIN — Medication 5 MILLIGRAM(S): at 18:37

## 2023-07-17 RX ADMIN — NYSTATIN CREAM 1 APPLICATION(S): 100000 CREAM TOPICAL at 07:25

## 2023-07-17 RX ADMIN — Medication 975 MILLIGRAM(S): at 13:55

## 2023-07-17 RX ADMIN — Medication 400 MILLIGRAM(S): at 23:05

## 2023-07-17 RX ADMIN — Medication 975 MILLIGRAM(S): at 07:42

## 2023-07-17 RX ADMIN — Medication 3 MILLILITER(S): at 18:37

## 2023-07-17 RX ADMIN — HYDROMORPHONE HYDROCHLORIDE 0.5 MILLIGRAM(S): 2 INJECTION INTRAMUSCULAR; INTRAVENOUS; SUBCUTANEOUS at 23:45

## 2023-07-17 RX ADMIN — Medication 50 MILLIGRAM(S): at 07:23

## 2023-07-17 RX ADMIN — Medication 2 MILLIGRAM(S): at 07:22

## 2023-07-17 RX ADMIN — LACOSAMIDE 200 MILLIGRAM(S): 50 TABLET ORAL at 07:42

## 2023-07-17 RX ADMIN — Medication 975 MILLIGRAM(S): at 07:10

## 2023-07-17 RX ADMIN — Medication 1 DROP(S): at 18:39

## 2023-07-17 RX ADMIN — LACOSAMIDE 140 MILLIGRAM(S): 50 TABLET ORAL at 23:06

## 2023-07-17 RX ADMIN — Medication 400 MILLIGRAM(S): at 23:00

## 2023-07-17 RX ADMIN — Medication 81 MILLIGRAM(S): at 12:01

## 2023-07-17 RX ADMIN — NYSTATIN CREAM 1 APPLICATION(S): 100000 CREAM TOPICAL at 23:07

## 2023-07-17 RX ADMIN — OXYCODONE HYDROCHLORIDE 5 MILLIGRAM(S): 5 TABLET ORAL at 01:20

## 2023-07-17 RX ADMIN — Medication 1 DROP(S): at 07:26

## 2023-07-17 RX ADMIN — Medication 2 MILLIGRAM(S): at 18:34

## 2023-07-17 RX ADMIN — Medication 1000 MILLIGRAM(S): at 23:30

## 2023-07-17 RX ADMIN — Medication 20 MILLIGRAM(S): at 12:02

## 2023-07-17 RX ADMIN — Medication 2 MILLIGRAM(S): at 12:56

## 2023-07-17 RX ADMIN — OXYCODONE HYDROCHLORIDE 5 MILLIGRAM(S): 5 TABLET ORAL at 00:52

## 2023-07-17 RX ADMIN — Medication 15 MILLILITER(S): at 12:03

## 2023-07-17 RX ADMIN — HEPARIN SODIUM 14 UNIT(S)/HR: 5000 INJECTION INTRAVENOUS; SUBCUTANEOUS at 23:07

## 2023-07-17 RX ADMIN — Medication 2 MILLIGRAM(S): at 23:53

## 2023-07-17 RX ADMIN — CALAMINE AND ZINC OXIDE AND PHENOL 1 APPLICATION(S): 160; 10 LOTION TOPICAL at 18:38

## 2023-07-17 RX ADMIN — Medication 3 MILLILITER(S): at 12:01

## 2023-07-17 RX ADMIN — SODIUM CHLORIDE 130 MILLILITER(S): 9 INJECTION, SOLUTION INTRAVENOUS at 00:52

## 2023-07-17 RX ADMIN — Medication 975 MILLIGRAM(S): at 12:55

## 2023-07-17 RX ADMIN — Medication 5 MILLIGRAM(S): at 23:14

## 2023-07-17 RX ADMIN — HYDROMORPHONE HYDROCHLORIDE 0.5 MILLIGRAM(S): 2 INJECTION INTRAMUSCULAR; INTRAVENOUS; SUBCUTANEOUS at 23:15

## 2023-07-17 RX ADMIN — CALAMINE AND ZINC OXIDE AND PHENOL 1 APPLICATION(S): 160; 10 LOTION TOPICAL at 07:25

## 2023-07-17 RX ADMIN — HEPARIN SODIUM 5000 UNIT(S): 5000 INJECTION INTRAVENOUS; SUBCUTANEOUS at 07:24

## 2023-07-17 RX ADMIN — Medication 975 MILLIGRAM(S): at 00:04

## 2023-07-17 RX ADMIN — PANTOPRAZOLE SODIUM 40 MILLIGRAM(S): 20 TABLET, DELAYED RELEASE ORAL at 07:23

## 2023-07-17 RX ADMIN — Medication 3 MILLILITER(S): at 07:22

## 2023-07-17 NOTE — PROGRESS NOTE ADULT - SUBJECTIVE AND OBJECTIVE BOX
SUBJECTIVE:  Patient was seen at bedside this AM with chief resident.    Objective:  Patient was hypertensive yesterday with -180s, received Labetalol 10 x2 however non-reposinve.  w/ BS 30cc. 500cc LR bolus given however SBP remained 19, given another labetalol 10. IM saw patient and restarted home amlodipine.  with no UOP, LR@130 was given for 5hrs.  Patient was tachy this AM to 130s with SOB and desat to 81%. O2 improved to 100% on NRB. IVF stopped and CXR and EKG ordered. CXR bedside appeared fluid overload. Lasix 40 x1 given.  Patient was started on BIPAP with improving vitals    MEDICATIONS  (STANDING):  acetaminophen   Oral Liquid .. 975 milliGRAM(s) Oral every 6 hours  albuterol/ipratropium for Nebulization 3 milliLiter(s) Nebulizer every 6 hours  amLODIPine   Tablet 10 milliGRAM(s) Oral daily  artificial  tears Solution 1 Drop(s) Both EYES two times a day  aspirin  chewable 81 milliGRAM(s) Oral daily  calamine/zinc oxide Lotion 1 Application(s) Topical two times a day  dextrose 50% Injectable 25 Gram(s) IV Push once  dextrose 50% Injectable 25 Gram(s) IV Push once  dextrose 50% Injectable 12.5 Gram(s) IV Push once  dextrose Oral Gel 15 Gram(s) Oral once  glucagon  Injectable 1 milliGRAM(s) IntraMuscular once  heparin   Injectable 5000 Unit(s) SubCutaneous every 8 hours  lacosamide Solution 200 milliGRAM(s) Oral two times a day  loperamide Liquid 2 milliGRAM(s) Enteral Tube every 6 hours  metoprolol tartrate 50 milliGRAM(s) Oral every 12 hours  multivitamin/minerals/iron Oral Solution (CENTRUM) 15 milliLiter(s) Oral daily  nystatin Powder 1 Application(s) Topical every 8 hours  pantoprazole    Tablet 40 milliGRAM(s) Oral before breakfast    MEDICATIONS  (PRN):  melatonin Liquid 5 milliGRAM(s) Oral at bedtime PRN Sleep  ondansetron Injectable 4 milliGRAM(s) IV Push every 8 hours PRN Nausea and/or Vomiting  oxyCODONE    Solution 5 milliGRAM(s) Oral every 6 hours PRN breakthrough pain  sodium chloride 0.9% lock flush 10 milliLiter(s) IV Push every 1 hour PRN Pre/post blood products, medications, blood draw, and to maintain line patency      Vital Signs Last 24 Hrs  T(C): 36.7 (17 Jul 2023 09:15), Max: 36.7 (17 Jul 2023 09:15)  T(F): 98 (17 Jul 2023 09:15), Max: 98 (17 Jul 2023 09:15)  HR: 121 (17 Jul 2023 06:01) (84 - 136)  BP: 176/115 (17 Jul 2023 05:36) (161/100 - 182/130)  BP(mean): 138 (17 Jul 2023 05:36) (126 - 151)  RR: 31 (17 Jul 2023 06:01) (18 - 31)  SpO2: 100% (17 Jul 2023 06:01) (87% - 100%)    Parameters below as of 17 Jul 2023 06:01  Patient On (Oxygen Delivery Method): BiPAP/CPAP    O2 Concentration (%): 100    Physical Exam:  General: NAD, resting comfortably in bed  Pulmonary: Nonlabored breathing, no respiratory distress on BIPAP  Cardiovascular: Sinus tachycardia  Abdominal: soft, ND, tender to physical exam  Extremities: WWP, normal strength  Neuro: A/O x 3, CNs II-XII grossly intact, no focal deficits, normal motor/sensation  Pulses: palpable distal pulses    I&O's Summary    16 Jul 2023 07:01  -  17 Jul 2023 07:00  --------------------------------------------------------  IN: 1400 mL / OUT: 350 mL / NET: 1050 mL        LABS:                        8.8    12.00 )-----------( 204      ( 17 Jul 2023 05:46 )             28.4     07-17    137  |  106  |  15  ----------------------------<  119<H>  4.0   |  19<L>  |  0.62    Ca    8.2<L>      17 Jul 2023 05:46  Phos  4.8     07-17  Mg     1.8     07-17      PT/INR - ( 17 Jul 2023 06:23 )   PT: 16.5 sec;   INR: 1.38          PTT - ( 17 Jul 2023 06:23 )  PTT:27.5 sec  Urinalysis Basic - ( 17 Jul 2023 05:46 )    Color: x / Appearance: x / SG: x / pH: x  Gluc: 119 mg/dL / Ketone: x  / Bili: x / Urobili: x   Blood: x / Protein: x / Nitrite: x   Leuk Esterase: x / RBC: x / WBC x   Sq Epi: x / Non Sq Epi: x / Bacteria: x      CAPILLARY BLOOD GLUCOSE            RADIOLOGY & ADDITIONAL STUDIES:

## 2023-07-17 NOTE — PROGRESS NOTE ADULT - ASSESSMENT
53M w PMHx of HTN, type A aortic dissection s/p Dacron grafts, AV resuspension in 2013, CAD s/p CABG x 1 SVG to RCA (5/2013 with Dr. Medina), seizure disorder, recent admission 3/20-4/14 for replacement of transverse aortic arch, second stage TEVAR and aorto-axillary bypass, AV replacement (bio 23mm), and CABG x 1 (SVG-RCA). Pt found to have endo leak and taken to OR for TEVAR revision on 5/5. Post op course c/b Klebsiella bacteremia (5/15), resp failure requiring intubation on 5/18, Mucus plugging s/p Bronch 5/19 and PEA arrest. Post operatively pt also found to have hemorrhagic pancreatitis with venu-pancreatic abscess possibly 2* to Depakote therefore s/p IR aspiration of peripancreatic fluid collection and paracentesis on 5/22, IR venu-pancreatic abscess drainage and placement of drainage catheter on 5/24. Pt then transferred from CTICU to SICU for septic shock, and further mgmt of hemorrhagic pancreatitis on 5/25. s/p IR placement of 2 new drainage catheters and catheter exchange on 5/26. LUQ drainage 5/30. OR 5/31 for exlap washout & replacement of 3 new JPs and abthera vac with open abdomen. RTOR 6/1, no bleeding, evac of old blood, placement of feeding J-tube, failed extubation due to PNA, completed ABX course and s/p trach 6/5 and decannulation 6/28. stepped down, but returned to SICU (7/2) for respiratory distress. Emergently intubated (7/3) for aspiration pneumonia, sputum cx grew pseudomonas. Extubated on 7/9.    CT C/A/P w/ IV contrast  Minced and moist diet w/ ensures/IVF  Pain/nausea control PRN  J-tube for feeding - currently held  Nystatin powder to buttocks TID  Imodium 2mg QID  ISS  Home: ASA. amlodipine, vimpat, metoprolol, protonix  Calorie count (7/16-)  HSQ/SCDs  IS/OOB  PT to see  Dispo: Acute rehab

## 2023-07-17 NOTE — PROGRESS NOTE ADULT - ASSESSMENT
53M w PMHx of HTN, type A aortic dissection s/p Dacron grafts, AV resuspension in 2013, CAD s/p CABG x 1 SVG to RCA (5/2013 with Dr. Medina), seizure disorder, recent admission 3/20-4/14 for replacement of transverse aortic arch, second stage TEVAR and aorto-axillary bypass, AV replacement (bio 23mm), and CABG x 1 (SVG-RCA), TEVAR revision on 5/5 with course complicated by Klebsiella bacteremia (5/15), resp failure requiring intubation on 5/18, Mucus plugging s/p Bronch 5/19 and PEA arrest,  hemorrhagic pancreatitis, Exlap with washout, AHRF requiring intubation secondary to aspiration pneumonia from pseudomonas who medicine consult service was consulted for blood pressure management.     # Hypertensive Urgency:   Patient reports that at home he is on triple therapy with Amlodipine 10 mg PO daily, Losartan 100 mg PO daily, and Atenolol 100 mg PO BID. Patient reports that his blood pressure in the outpatient setting prior to this admission was 120s/70s.unable to assess patients mental status/ sx at this time as he is on bipap - was hypoxic overnight likely 2/2 flash pulmonary edema, 2/2 fluids & htn emergency. Pt with elevated HR overnight likely 2/2 hypoxia 2/2 flash pulmonary edema   labetalol did not work for pt - can no longer take po meds as per primary team 2/2 jg tube being clamped and is on bipap  - can control blood pressure with lopressor 5 mg iv push q6 with hold parameters <100/60 with PRN break through of lopressor 2.5 mg push or hydralazine 10 mg if HR <60   - can transition back to PO blood pressure medications once tolerating PO    #hypoxia  patient with sob & hypoxia overnight   was placed on bipap and given lasix with thought of possible pulmonary edema vs pE  -f/u ct angio chest    Rest of care per primary team.  Medicine will continue to follow. Note not finalized until attending attestation complete.

## 2023-07-17 NOTE — PROGRESS NOTE ADULT - ATTENDING COMMENTS
53M w PMHx of HTN, type A aortic dissection s/p Dacron grafts, AV resuspension in 2013, CAD s/p CABG x 1 SVG to RCA (5/2013 with Dr. Medina), seizure disorder, recent admission 3/20-4/14 for replacement of transverse aortic arch, second stage TEVAR and aorto-axillary bypass, AV replacement (bio 23mm), and CABG x 1 (SVG-RCA), TEVAR revision on 5/5 with course complicated by Klebsiella bacteremia (5/15), resp failure requiring intubation on 5/18, Mucus plugging s/p Bronch 5/19 and PEA arrest,  hemorrhagic pancreatitis, Exlap with washout, AHRF requiring intubation secondary to aspiration pneumonia from pseudomonas who medicine consult service was consulted for blood pressure management.7/16- overnight he was given ivf, hypoxic/tachycardiac required BIPAP therapy- on step down   -quite lethargic on bipap   - open eyes to sternal rub and would moves hands, not moving leg, minimal interaction.  - tele =heart rate in 90s during encounter  per RN, he took oral medication this morning, prior to Bipap placement, J tube is clamped.    - hypertensive emergency   - hypoxic respiratory failure ( acute ) with tachycardia ( pulmonary edema presumed, given lasix, rule out PE , pneumonia, fu CTA   - CAD sp CABG/Tevar  - currently on bipap, close monitoring and consider monitoring in sicu  - HTN= home med is BB ( metoprolol - amlodipine 10 and losartan --was started on metoprolol tartrate 50 bid , restarted amlodipine 10 this am, not started on losartan ( cr ok )  if he remain on bipap/ not able to give oral meds- would give lopressor 5 mg iv 6 hourly ( starting on the time the next metoprolol dose is due ) hold for HR <60, SBP <100  can give prn lopressor 2.5 iv  Q 6 hourly if HR > 60 ,, SBP >160 , if HR <70- to give Hydralazine 10 mg iv q 6 hourly    thank you for allowing medicine to participate in the care, rio RN, team 1 surgery,  Dr. Rae -close monitoring

## 2023-07-17 NOTE — CHART NOTE - NSCHARTNOTEFT_GEN_A_CORE
Chief Resident Note    Called to bedside by floor team for reports of new onset tachycardia and hypoxia. Patient immediately seen and examined at bedside. He stated he was having trouble breathing, denied any chest pain at this time. On exam, he was tachycardic to 130's, saturating 100% on NRB. Was previously saturating 81%. Tachypneic on exam, with shallow, belly breathing. CXR, EKG and STAT labs ordered. Intensivist called to bedside for further evaluation. Patient had received fluid bolus for low UO overnight, so suspicion for overload high on differential. 40mg of lasix administered and patient placed on BiPAP. He was monitored on 100% FiO2, with resolution of tachypnea and improvement in hemodynamics (HR low 100's, saturation 100%, /110). Plan is to continue BiPAP support and monitor diuretic response. Anesthesia is aware of patient and assessed him at bedside in setting of likely difficult airway and possible impending intubation. They will see the patient today and follow closely. Case discussed with attending with recommendations for CT CAP once patient is stabilized.

## 2023-07-17 NOTE — PROGRESS NOTE ADULT - SUBJECTIVE AND OBJECTIVE BOX
SUBJECTIVE / INTERVAL HPI: Patient seen and examined at bedside.  Patient was on BIPAP, following minimal commands     VITAL SIGNS:  Vital Signs Last 24 Hrs  T(C): 36.7 (17 Jul 2023 09:15), Max: 36.7 (17 Jul 2023 09:15)  T(F): 98 (17 Jul 2023 09:15), Max: 98 (17 Jul 2023 09:15)  HR: 80 (17 Jul 2023 10:12) (80 - 136)  BP: 155/101 (17 Jul 2023 10:12) (140/104 - 182/130)  BP(mean): 123 (17 Jul 2023 10:12) (115 - 151)  RR: 16 (17 Jul 2023 10:12) (16 - 31)  SpO2: 97% (17 Jul 2023 10:12) (87% - 100%)    Parameters below as of 17 Jul 2023 10:12  Patient On (Oxygen Delivery Method): BiPAP/CPAP    O2 Concentration (%): 80    PHYSICAL EXAM:    General: WDWN  HEENT: on bipap   Neck: supple  Cardiovascular: +S1/S2; RRR  Respiratory: decreased breath sounds b/l  Gastrointestinal: soft, jg tube in place  Extremities: WWP; no edema, clubbing or cyanosis  Vascular: 2+ radial, DP/PT pulses B/L  Neurological: followed minimal commands - squeezed hands did not move le     MEDICATIONS:  MEDICATIONS  (STANDING):  acetaminophen   Oral Liquid .. 975 milliGRAM(s) Oral every 6 hours  albuterol/ipratropium for Nebulization 3 milliLiter(s) Nebulizer every 6 hours  amLODIPine   Tablet 10 milliGRAM(s) Oral daily  artificial  tears Solution 1 Drop(s) Both EYES two times a day  aspirin  chewable 81 milliGRAM(s) Oral daily  calamine/zinc oxide Lotion 1 Application(s) Topical two times a day  dextrose 50% Injectable 25 Gram(s) IV Push once  dextrose 50% Injectable 25 Gram(s) IV Push once  dextrose 50% Injectable 12.5 Gram(s) IV Push once  dextrose Oral Gel 15 Gram(s) Oral once  glucagon  Injectable 1 milliGRAM(s) IntraMuscular once  heparin   Injectable 5000 Unit(s) SubCutaneous every 8 hours  lacosamide Solution 200 milliGRAM(s) Oral two times a day  loperamide Liquid 2 milliGRAM(s) Enteral Tube every 6 hours  metoprolol tartrate 50 milliGRAM(s) Oral every 12 hours  multivitamin/minerals/iron Oral Solution (CENTRUM) 15 milliLiter(s) Oral daily  nystatin Powder 1 Application(s) Topical every 8 hours  pantoprazole    Tablet 40 milliGRAM(s) Oral before breakfast    MEDICATIONS  (PRN):  melatonin Liquid 5 milliGRAM(s) Oral at bedtime PRN Sleep  ondansetron Injectable 4 milliGRAM(s) IV Push every 8 hours PRN Nausea and/or Vomiting  oxyCODONE    Solution 5 milliGRAM(s) Oral every 6 hours PRN breakthrough pain  sodium chloride 0.9% lock flush 10 milliLiter(s) IV Push every 1 hour PRN Pre/post blood products, medications, blood draw, and to maintain line patency      ALLERGIES:  Allergies    No Known Allergies    Intolerances        LABS:                        8.8    12.00 )-----------( 204      ( 17 Jul 2023 05:46 )             28.4     07-17    137  |  106  |  15  ----------------------------<  119<H>  4.0   |  19<L>  |  0.62    Ca    8.2<L>      17 Jul 2023 05:46  Phos  4.8     07-17  Mg     1.8     07-17      PT/INR - ( 17 Jul 2023 06:23 )   PT: 16.5 sec;   INR: 1.38          PTT - ( 17 Jul 2023 06:23 )  PTT:27.5 sec  Urinalysis Basic - ( 17 Jul 2023 05:46 )    Color: x / Appearance: x / SG: x / pH: x  Gluc: 119 mg/dL / Ketone: x  / Bili: x / Urobili: x   Blood: x / Protein: x / Nitrite: x   Leuk Esterase: x / RBC: x / WBC x   Sq Epi: x / Non Sq Epi: x / Bacteria: x      CAPILLARY BLOOD GLUCOSE          RADIOLOGY & ADDITIONAL TESTS: Reviewed.    ASSESSMENT:    PLAN:

## 2023-07-18 LAB
ANION GAP SERPL CALC-SCNC: 10 MMOL/L — SIGNIFICANT CHANGE UP (ref 5–17)
APTT BLD: 118 SEC — HIGH (ref 27.5–35.5)
APTT BLD: 34.5 SEC — SIGNIFICANT CHANGE UP (ref 27.5–35.5)
APTT BLD: 48.5 SEC — HIGH (ref 27.5–35.5)
APTT BLD: 72.7 SEC — HIGH (ref 27.5–35.5)
BLD GP AB SCN SERPL QL: NEGATIVE — SIGNIFICANT CHANGE UP
BUN SERPL-MCNC: 13 MG/DL — SIGNIFICANT CHANGE UP (ref 7–23)
CALCIUM SERPL-MCNC: 8.3 MG/DL — LOW (ref 8.4–10.5)
CHLORIDE SERPL-SCNC: 105 MMOL/L — SIGNIFICANT CHANGE UP (ref 96–108)
CO2 SERPL-SCNC: 25 MMOL/L — SIGNIFICANT CHANGE UP (ref 22–31)
CREAT SERPL-MCNC: 0.68 MG/DL — SIGNIFICANT CHANGE UP (ref 0.5–1.3)
EGFR: 110 ML/MIN/1.73M2 — SIGNIFICANT CHANGE UP
GLUCOSE SERPL-MCNC: 83 MG/DL — SIGNIFICANT CHANGE UP (ref 70–99)
HCT VFR BLD CALC: 24.5 % — LOW (ref 39–50)
HCT VFR BLD CALC: 25.6 % — LOW (ref 39–50)
HGB BLD-MCNC: 7.9 G/DL — LOW (ref 13–17)
HGB BLD-MCNC: 8 G/DL — LOW (ref 13–17)
INR BLD: 1.39 — HIGH (ref 0.88–1.16)
LACTATE SERPL-SCNC: 0.9 MMOL/L — SIGNIFICANT CHANGE UP (ref 0.5–2)
MAGNESIUM SERPL-MCNC: 1.7 MG/DL — SIGNIFICANT CHANGE UP (ref 1.6–2.6)
MCHC RBC-ENTMCNC: 29.7 PG — SIGNIFICANT CHANGE UP (ref 27–34)
MCHC RBC-ENTMCNC: 29.9 PG — SIGNIFICANT CHANGE UP (ref 27–34)
MCHC RBC-ENTMCNC: 31.3 GM/DL — LOW (ref 32–36)
MCHC RBC-ENTMCNC: 32.2 GM/DL — SIGNIFICANT CHANGE UP (ref 32–36)
MCV RBC AUTO: 92.8 FL — SIGNIFICANT CHANGE UP (ref 80–100)
MCV RBC AUTO: 95.2 FL — SIGNIFICANT CHANGE UP (ref 80–100)
NRBC # BLD: 0 /100 WBCS — SIGNIFICANT CHANGE UP (ref 0–0)
NRBC # BLD: 0 /100 WBCS — SIGNIFICANT CHANGE UP (ref 0–0)
PHOSPHATE SERPL-MCNC: 4.6 MG/DL — HIGH (ref 2.5–4.5)
PLATELET # BLD AUTO: 454 K/UL — HIGH (ref 150–400)
PLATELET # BLD AUTO: 464 K/UL — HIGH (ref 150–400)
POTASSIUM SERPL-MCNC: 4.4 MMOL/L — SIGNIFICANT CHANGE UP (ref 3.5–5.3)
POTASSIUM SERPL-SCNC: 4.4 MMOL/L — SIGNIFICANT CHANGE UP (ref 3.5–5.3)
PROTHROM AB SERPL-ACNC: 16.6 SEC — HIGH (ref 10.5–13.4)
RBC # BLD: 2.64 M/UL — LOW (ref 4.2–5.8)
RBC # BLD: 2.69 M/UL — LOW (ref 4.2–5.8)
RBC # FLD: 17.6 % — HIGH (ref 10.3–14.5)
RBC # FLD: 17.8 % — HIGH (ref 10.3–14.5)
RH IG SCN BLD-IMP: POSITIVE — SIGNIFICANT CHANGE UP
SODIUM SERPL-SCNC: 140 MMOL/L — SIGNIFICANT CHANGE UP (ref 135–145)
WBC # BLD: 10.82 K/UL — HIGH (ref 3.8–10.5)
WBC # BLD: 11.1 K/UL — HIGH (ref 3.8–10.5)
WBC # FLD AUTO: 10.82 K/UL — HIGH (ref 3.8–10.5)
WBC # FLD AUTO: 11.1 K/UL — HIGH (ref 3.8–10.5)

## 2023-07-18 PROCEDURE — 99232 SBSQ HOSP IP/OBS MODERATE 35: CPT

## 2023-07-18 PROCEDURE — 71045 X-RAY EXAM CHEST 1 VIEW: CPT | Mod: 26

## 2023-07-18 RX ORDER — LIDOCAINE HCL 20 MG/ML
20 VIAL (ML) INJECTION ONCE
Refills: 0 | Status: COMPLETED | OUTPATIENT
Start: 2023-07-18 | End: 2023-07-18

## 2023-07-18 RX ORDER — CHLORHEXIDINE GLUCONATE 213 G/1000ML
1 SOLUTION TOPICAL
Refills: 0 | Status: DISCONTINUED | OUTPATIENT
Start: 2023-07-18 | End: 2023-07-19

## 2023-07-18 RX ORDER — LABETALOL HCL 100 MG
10 TABLET ORAL ONCE
Refills: 0 | Status: COMPLETED | OUTPATIENT
Start: 2023-07-18 | End: 2023-07-18

## 2023-07-18 RX ORDER — MAGNESIUM SULFATE 500 MG/ML
1 VIAL (ML) INJECTION ONCE
Refills: 0 | Status: COMPLETED | OUTPATIENT
Start: 2023-07-18 | End: 2023-07-18

## 2023-07-18 RX ADMIN — HYDROMORPHONE HYDROCHLORIDE 0.5 MILLIGRAM(S): 2 INJECTION INTRAMUSCULAR; INTRAVENOUS; SUBCUTANEOUS at 15:09

## 2023-07-18 RX ADMIN — LACOSAMIDE 140 MILLIGRAM(S): 50 TABLET ORAL at 07:18

## 2023-07-18 RX ADMIN — Medication 2 MILLIGRAM(S): at 13:51

## 2023-07-18 RX ADMIN — Medication 100 GRAM(S): at 09:15

## 2023-07-18 RX ADMIN — Medication 1000 MILLIGRAM(S): at 06:35

## 2023-07-18 RX ADMIN — Medication 3 MILLILITER(S): at 12:20

## 2023-07-18 RX ADMIN — HYDROMORPHONE HYDROCHLORIDE 0.5 MILLIGRAM(S): 2 INJECTION INTRAMUSCULAR; INTRAVENOUS; SUBCUTANEOUS at 15:40

## 2023-07-18 RX ADMIN — Medication 1 DROP(S): at 06:02

## 2023-07-18 RX ADMIN — Medication 5 MILLIGRAM(S): at 06:02

## 2023-07-18 RX ADMIN — HEPARIN SODIUM 15 UNIT(S)/HR: 5000 INJECTION INTRAVENOUS; SUBCUTANEOUS at 05:46

## 2023-07-18 RX ADMIN — Medication 3 MILLILITER(S): at 17:43

## 2023-07-18 RX ADMIN — NYSTATIN CREAM 1 APPLICATION(S): 100000 CREAM TOPICAL at 14:06

## 2023-07-18 RX ADMIN — Medication 81 MILLIGRAM(S): at 12:21

## 2023-07-18 RX ADMIN — LACOSAMIDE 140 MILLIGRAM(S): 50 TABLET ORAL at 19:16

## 2023-07-18 RX ADMIN — Medication 1 DROP(S): at 17:41

## 2023-07-18 RX ADMIN — Medication 3 MILLILITER(S): at 06:01

## 2023-07-18 RX ADMIN — Medication 5 MILLIGRAM(S): at 17:42

## 2023-07-18 RX ADMIN — Medication 15 MILLILITER(S): at 13:51

## 2023-07-18 RX ADMIN — PANTOPRAZOLE SODIUM 40 MILLIGRAM(S): 20 TABLET, DELAYED RELEASE ORAL at 12:19

## 2023-07-18 RX ADMIN — NYSTATIN CREAM 1 APPLICATION(S): 100000 CREAM TOPICAL at 06:03

## 2023-07-18 RX ADMIN — CALAMINE AND ZINC OXIDE AND PHENOL 1 APPLICATION(S): 160; 10 LOTION TOPICAL at 17:42

## 2023-07-18 RX ADMIN — HEPARIN SODIUM 15 UNIT(S)/HR: 5000 INJECTION INTRAVENOUS; SUBCUTANEOUS at 10:27

## 2023-07-18 RX ADMIN — Medication 5 MILLIGRAM(S): at 12:20

## 2023-07-18 RX ADMIN — Medication 10 MILLIGRAM(S): at 21:34

## 2023-07-18 RX ADMIN — Medication 400 MILLIGRAM(S): at 06:00

## 2023-07-18 RX ADMIN — Medication 2 MILLIGRAM(S): at 06:01

## 2023-07-18 RX ADMIN — CALAMINE AND ZINC OXIDE AND PHENOL 1 APPLICATION(S): 160; 10 LOTION TOPICAL at 06:02

## 2023-07-18 RX ADMIN — Medication 2 MILLIGRAM(S): at 17:43

## 2023-07-18 RX ADMIN — Medication 1000 MILLIGRAM(S): at 00:14

## 2023-07-18 NOTE — PROGRESS NOTE ADULT - ASSESSMENT
A/p: 53M w PMHx of HTN, type A aortic dissection s/p Dacron grafts, AV resuspension in 2013, CAD s/p CABG x 1 SVG to RCA (5/2013 with Dr. Medina), seizure disorder, recent admission 3/20-4/14 for replacement of transverse aortic arch, second stage TEVAR and aorto-axillary bypass, AV replacement (bio 23mm), and CABG x 1 (SVG-RCA). Pt found to have endo leak and taken to OR for TEVAR revision on 5/5. Post op course c/b Klebsiella bacteremia (5/15), resp failure requiring intubation on 5/18, Mucus plugging s/p Bronch 5/19 and PEA arrest. Post operatively pt also found to have hemorrhagic pancreatitis with venu-pancreatic abscess possibly 2* to Depakote therefore s/p IR aspiration of peripancreatic fluid collection and paracentesis on 5/22, IR venu-pancreatic abscess drainage and placement of drainage catheter on 5/24. Pt then transferred from CTICU to SICU for septic shock, and further mgmt of hemorrhagic pancreatitis on 5/25. s/p IR placement of 2 new drainage catheters and catheter exchange on 5/26. LUQ drainage 5/30. OR 5/31 for exlap washout & replacement of 3 new JPs and abthera vac with open abdomen. RTOR 6/1, no bleeding, evac of old blood, placement of feeding J-tube, failed extubation due to PNA, completed ABX course and s/p trach 6/5 and decannulation 6/28. stepped down, but returned to SICU (7/2) for respiratory distress. Emergently intubated (7/3) for aspiration pneumonia, sputum cx grew pseudomonas. Extubated on 7/9. CT scan on 7/17 shows chronic PE and concern for cecal ischemia.     NPO  Pain/nausea control PRN  J-tube for feeding - currently held  Nystatin powder to buttocks TID  Imodium 2mg QID  ISS  Calorie count (7/16-)  Heparin gtt , check ptt every 4-6 hours  SCDs  IS/OOB  Dispo: Acute rehab

## 2023-07-18 NOTE — PROGRESS NOTE ADULT - ASSESSMENT
53M w PMHx of HTN, type A aortic dissection s/p Dacron grafts, AV resuspension in 2013, CAD s/p CABG x 1 SVG to RCA (5/2013 with Dr. Medina), seizure disorder, recent admission 3/20-4/14 for replacement of transverse aortic arch, second stage TEVAR and aorto-axillary bypass, AV replacement (bio 23mm), and CABG x 1 (SVG-RCA), TEVAR revision on 5/5 with course complicated by Klebsiella bacteremia (5/15), resp failure requiring intubation on 5/18, Mucus plugging s/p Bronch 5/19 and PEA arrest,  hemorrhagic pancreatitis, Exlap with washout, AHRF requiring intubation secondary to aspiration pneumonia from pseudomonas who medicine consult service was consulted for blood pressure management.     # Hypertensive Urgency:   Patient reports that at home he is on triple therapy with Amlodipine 10 mg PO daily, Losartan 100 mg PO daily, and Atenolol 100 mg PO BID. Patient reports that his blood pressure in the outpatient setting prior to this admission was 120s/70s.unable to assess patients mental status/ sx at this time as he is on bipap - was hypoxic overnight likely 2/2 flash pulmonary edema, 2/2 fluids & htn emergency. Pt with elevated HR overnight likely 2/2 hypoxia 2/2 flash pulmonary edema   labetalol did not work for pt - can no longer take po meds as per primary team 2/2 jg tube being clamped and is on bipap  blood pressure well controlled on lopressor 5 mg iv push q6 with hold parameters <100/60, can give PRN break through of lopressor 2.5 mg push or hydralazine 10 mg if HR <60   - can transition back to PO blood pressure medications once tolerating PO    #bowel ischemia  pt with bowel ischemia seen on CTAP - no surgical intervention per primary team   patient NPO at this time  -recommend starting d5 LR @ 60 cc/hr for hydration & nutrition supplementation   -recommend starting low dose insulin sliding scale & q6 fingersticks to monitor blood glucose     #hypoxia  patient with sob & hypoxia overnight   was placed on bipap and given lasix with thought of possible pulmonary edema vs PE  CT angio chest showing old PE's  possible fluid overload - patient with + JVD  -recommend lasix 20 IV x1 today with hold parameters BP <100/60     Rest of care per primary team.  Medicine will continue to follow. Note not finalized until attending attestation complete.

## 2023-07-18 NOTE — PROGRESS NOTE ADULT - ATTENDING COMMENTS
53M w PMHx of HTN, type A aortic dissection s/p Dacron grafts, AV resuspension in 2013, CAD s/p CABG x 1 SVG to RCA (5/2013 with Dr. Medina), seizure disorder, recent admission 3/20-4/14 for replacement of transverse aortic arch, second stage TEVAR and aorto-axillary bypass, AV replacement (bio 23mm), and CABG x 1 (SVG-RCA), TEVAR revision on 5/5 with course complicated by Klebsiella bacteremia (5/15), resp failure requiring intubation on 5/18, Mucus plugging s/p Bronch 5/19 and PEA arrest,  hemorrhagic pancreatitis, Exlap with washout, AHRF requiring intubation secondary to aspiration pneumonia from pseudomonas who medicine consult service was consulted for blood pressure management.7/16- overnight he was given ivf, hypoxic/tachycardiac required BIPAP therapy- on step down -titrated down to nasal canula, CT C/a/P reviewed, PE, likely cecal ischemia, ? renal infarct, splenic infarct     - on oxygen nasal canula, remain lethargic, open eyes, not verbalizing.  - JVP is elevated on exam  - reduce air entry on lower lung field.  - J tube in place  - mild edema on lower leg mostly around the foot.  very lethargic, not interactive.  npo  on heparin gtt for PE   getting medication via IV   glucose on lower end.  notes/labs/imaging reviewed.    # hypertensive emergency   # hypoxic respiratory failure ( acute ) with tachycardia - PE on CT  # CAD sp CABG/Tevar  # evidence of fluid overload- possible pulmonary Hypertension   bilateral pleural effusion - wbc is mildly elevated  - currently on nasal canula   - to give lasix 20 mg iv x 1 dose 7/18  - while npo to give IVF ( if J tube ok to use prefer enteral nutrition )- if not to give IVF with D5LR ( glucose on lower normal -would give some D5 contiaining fluid ) at low rate 60-80 cc per hour     #  HTN= home med is BB ( metoprolol - amlodipine 10 and losartan --was started on metoprolol tartrate 50 bid , restarted amlodipine 10 this am, not started on losartan ( cr ok )  if he remain on bipap/ not able to give oral meds- would give lopressor 5 mg iv 6 hourly ( starting on the time the next metoprolol dose is due ) hold for HR <60, SBP <100  can give prn lopressor 2.5 iv  Q 6 hourly if HR > 60 ,, SBP >160 , if HR <70- to give Hydralazine 10 mg iv q 6 hourly    # PE / pulmonary hypertension  currently on Heparin gtt   ( splenic infarct/ possilbe renal infarct - monitor BMP/ urine output    pt remain high risk   thank you for allowing medicine to participate in the care, team 1 surgery, [facility] Dr. Rae -close monitoring

## 2023-07-18 NOTE — PROGRESS NOTE ADULT - ATTENDING SUPERVISION STATEMENT
Fellow
Resident
Fellow
Resident
Fellow
Resident
Resident
Student
Fellow
Resident
Fellow
Resident

## 2023-07-18 NOTE — PROGRESS NOTE ADULT - TIME BILLING
Management of VAP
Management of VAP
treatment of infected hematoma
Evaluation for VAP
Management of VAP
treatment of infected hematoma
evaluation of fever and leukocytosis
treatment of infected hematoma
Klebsiella BSI i/s/o VAP and pancreatic necrosis/RP hemorrhage (adjacent to aortic vascular graft). f/u surveillance bcx 5/18; consider PET CT given proximity of infected necrosis to vascular graft to assess for vascular graft infection. Advise continue Zosyn and d/c vancomycin (no evidence of gram positive infection).    Dr. Posada to cover this evening thru Sunday
Management of VAP
Management of VAP
Now s/p large-volume paracentesis as well as sampling of peripancreatic collection. To f/u cx data. Antimicrobials as outlined above; if plan to start CVVHD, please discuss dose adjustment with pharmacy.
treatment of infected hematoma
treatment of infected hematoma
treatment of pneumonia
Management of VAP
treatment of infected fluid collections
treatment of bacteremia and hemorrhagic pancreatitis
treatment of infected fluid collections
treatment of pneumonia due to pseudomonas
Critically ill; rapid reaccumulation of ascites; WBC up to 57; pancreatic fluid cx with growth of Klebsiella. f/u GI/surgery plans for endoscopic vs. surgical intervention. Antimicrobial recommendations as above.
treatment of infected hematoma
treatment of infected hematoma
Management of bacteremia, VAP, infected pancreatic necrosis, pancreatitis.     Dr. Nicolas assumes care tomorrow.
management of infected hematoma
review of clinical data, caring and coordination.

## 2023-07-18 NOTE — CONSULT NOTE ADULT - SUBJECTIVE AND OBJECTIVE BOX
Vascular Attending:  Terra    HPI: 52M w/ PMHx of HTN, type A aortic dissection s/p Dacron grafts, AV resuspension in 2013, CAD s/p CABG x 1 SVG to RCA (5/2013 with Dr. Medina), seizure disorder (last episode on 7/4/22) admitted for progression of aneurysmal disease with course complicated by necrotizing pancreatitis. Vascular surgery is consulted for bloody stools following initiation of heparin for PE.     Patient has been admitted with a complicated course. Recent CT A/P w/ contrast yesterday incidentally showed bilateral PEs which in retrospect were present on imaging one month ago. Pneumatosis coli was also noted at the cecum. The patient was started on heparin overnight. Initially his PTT was elevated at 118 and later came down within target range. This morning, he had a loose stool with some streaked blood. The patient denies any asymmetric leg swelling, pain or chest pain. He is having ongoing shortness of breath.     PAST MEDICAL & SURGICAL HISTORY:  HTN (hypertension)      Aortic dissection      CAD (coronary artery disease)      Seizure disorder      Seizure disorder      Hypertension      Status post endovascular aneurysm repair (EVAR)      S/P aortic bifurcation bypass graft      S/P CABG x 1          REVIEW OF SYSTEMS  Per HPI    MEDICATIONS  (STANDING):  albuterol/ipratropium for Nebulization 3 milliLiter(s) Nebulizer every 6 hours  artificial  tears Solution 1 Drop(s) Both EYES two times a day  aspirin  chewable 81 milliGRAM(s) Oral daily  calamine/zinc oxide Lotion 1 Application(s) Topical two times a day  dextrose 50% Injectable 12.5 Gram(s) IV Push once  dextrose 50% Injectable 25 Gram(s) IV Push once  dextrose 50% Injectable 25 Gram(s) IV Push once  dextrose Oral Gel 15 Gram(s) Oral once  glucagon  Injectable 1 milliGRAM(s) IntraMuscular once  lacosamide IVPB 200 milliGRAM(s) IV Intermittent every 12 hours  lidocaine 1% (Preservative-free) Injectable 20 milliLiter(s) Local Injection once  loperamide Liquid 2 milliGRAM(s) Enteral Tube every 6 hours  metoprolol tartrate Injectable 5 milliGRAM(s) IV Push every 6 hours  multivitamin/minerals/iron Oral Solution (CENTRUM) 15 milliLiter(s) Oral daily  nystatin Powder 1 Application(s) Topical every 8 hours  pantoprazole  Injectable 40 milliGRAM(s) IV Push daily    MEDICATIONS  (PRN):  HYDROmorphone  Injectable 0.5 milliGRAM(s) IV Push every 6 hours PRN Severe Pain (7 - 10)  ondansetron Injectable 4 milliGRAM(s) IV Push every 8 hours PRN Nausea and/or Vomiting  sodium chloride 0.9% lock flush 10 milliLiter(s) IV Push every 1 hour PRN Pre/post blood products, medications, blood draw, and to maintain line patency      Allergies    No Known Allergies    Intolerances        SOCIAL HISTORY:    FAMILY HISTORY:  No pertinent family history in first degree relatives        Vital Signs Last 24 Hrs  T(C): 36.6 (18 Jul 2023 14:00), Max: 36.9 (17 Jul 2023 21:57)  T(F): 97.8 (18 Jul 2023 14:00), Max: 98.4 (17 Jul 2023 21:57)  HR: 100 (18 Jul 2023 12:40) (84 - 100)  BP: 160/108 (18 Jul 2023 12:40) (153/107 - 169/107)  BP(mean): 129 (18 Jul 2023 12:40) (118 - 134)  RR: 18 (18 Jul 2023 12:40) (15 - 19)  SpO2: 100% (18 Jul 2023 12:40) (93% - 100%)    Parameters below as of 18 Jul 2023 12:40  Patient On (Oxygen Delivery Method): nasal cannula  O2 Flow (L/min): 2      PHYSICAL EXAM:  Gen: Chronically appearing gentleman. Awake, alert.   CV: HDS, WWP  Pulm: Breathing comfortably on 2L NC. no effort or distress.   Abd: S/nt/nd  Ext: Trace edema to ankles. None above. No swelling or redness bilaterally.     LABS:                        7.9    11.10 )-----------( 454      ( 18 Jul 2023 15:54 )             24.5     07-18    140  |  105  |  13  ----------------------------<  83  4.4   |  25  |  0.68    Ca    8.3<L>      18 Jul 2023 05:30  Phos  4.6     07-18  Mg     1.7     07-18      PT/INR - ( 18 Jul 2023 15:54 )   PT: 16.6 sec;   INR: 1.39          PTT - ( 18 Jul 2023 15:54 )  PTT:34.5 sec  Urinalysis Basic - ( 18 Jul 2023 05:30 )    Color: x / Appearance: x / SG: x / pH: x  Gluc: 83 mg/dL / Ketone: x  / Bili: x / Urobili: x   Blood: x / Protein: x / Nitrite: x   Leuk Esterase: x / RBC: x / WBC x   Sq Epi: x / Non Sq Epi: x / Bacteria: x    RADIOLOGY & ADDITIONAL STUDIES    CT A/P       IMPRESSION:  1. Since 7/2/2023, there are filling defects in right-sided pulmonary arteries, consistent with pulmonary emboli. In retrospect, these pulmonary emboli have been present on prior studies dating back to 6/16/2023. No new pulmonary emboli are identified.    2. Multiple new wedge-shaped areas of diminished enhancement in the kidneys bilaterally. These are suspicious for infarcts. The differential diagnosis includes pyelonephritis.    3. Pneumatosis coli in the cecum. This raises the suspicion of ischemia.    4. New left upper lobe infiltrate in addition to right-sided infiltrates. The differential diagnosis includes ARDS, edema, infection, and hemorrhage.    5. The right ventricle is larger than the left ventricle. While this may be a sign of right heart strain, this finding has been present since the patient's first CT scan of 11/30/2022. The right heart enlargement may therefore be a sign of pulmonary hypertension.    6. No significant change large bilateral pleural effusions.    7. Improvement in the appearance of the pancreas.    8. No significant change in the appearance of the postoperative appearance of the thoracoabdominal aortic dissection and aneurysm.    9. Decrease in size of splenic infarct.    10. Anasarca.    Findings were discussed with Dr. Mildred Carter on 7/17/2023 11:52 AM by Dr. Wheat with read back confirmation.    A/P: 53M w/ PMHx type A dissection admitted initially for endoleak management now admitted for sequelae of necrotizing pancreatitis, found to have PE. Per primary team, impression is that the patient cannot be safely anticoagulated given bloody bowel movements, possibly secondary to colitis.     - Add on for IVC filter w/ Dr. Ellison  - Please make NPO at midnight     Discussed w/ chief.  Vascular Attending:  Terra    HPI: 52M w/ PMHx of HTN, type A aortic dissection s/p Dacron grafts, AV resuspension in 2013, CAD s/p CABG x 1 SVG to RCA (5/2013 with Dr. Medina), seizure disorder (last episode on 7/4/22) admitted for progression of aneurysmal disease with course complicated by necrotizing pancreatitis. Vascular surgery is consulted for bloody stools following initiation of heparin for PE.     Patient has been admitted with a complicated course. Recent CT A/P w/ contrast yesterday incidentally showed bilateral PEs which in retrospect were present on imaging one month ago. Pneumatosis coli was also noted at the cecum. The patient was started on heparin overnight. Initially his PTT was elevated at 118 and later came down within target range. This morning, he had a loose stool with some streaked blood. The patient denies any asymmetric leg swelling, pain or chest pain. He is having ongoing shortness of breath.     PAST MEDICAL & SURGICAL HISTORY:  HTN (hypertension)      Aortic dissection      CAD (coronary artery disease)      Seizure disorder      Seizure disorder      Hypertension      Status post endovascular aneurysm repair (EVAR)      S/P aortic bifurcation bypass graft      S/P CABG x 1          REVIEW OF SYSTEMS  Per HPI    MEDICATIONS  (STANDING):  albuterol/ipratropium for Nebulization 3 milliLiter(s) Nebulizer every 6 hours  artificial  tears Solution 1 Drop(s) Both EYES two times a day  aspirin  chewable 81 milliGRAM(s) Oral daily  calamine/zinc oxide Lotion 1 Application(s) Topical two times a day  dextrose 50% Injectable 12.5 Gram(s) IV Push once  dextrose 50% Injectable 25 Gram(s) IV Push once  dextrose 50% Injectable 25 Gram(s) IV Push once  dextrose Oral Gel 15 Gram(s) Oral once  glucagon  Injectable 1 milliGRAM(s) IntraMuscular once  lacosamide IVPB 200 milliGRAM(s) IV Intermittent every 12 hours  lidocaine 1% (Preservative-free) Injectable 20 milliLiter(s) Local Injection once  loperamide Liquid 2 milliGRAM(s) Enteral Tube every 6 hours  metoprolol tartrate Injectable 5 milliGRAM(s) IV Push every 6 hours  multivitamin/minerals/iron Oral Solution (CENTRUM) 15 milliLiter(s) Oral daily  nystatin Powder 1 Application(s) Topical every 8 hours  pantoprazole  Injectable 40 milliGRAM(s) IV Push daily    MEDICATIONS  (PRN):  HYDROmorphone  Injectable 0.5 milliGRAM(s) IV Push every 6 hours PRN Severe Pain (7 - 10)  ondansetron Injectable 4 milliGRAM(s) IV Push every 8 hours PRN Nausea and/or Vomiting  sodium chloride 0.9% lock flush 10 milliLiter(s) IV Push every 1 hour PRN Pre/post blood products, medications, blood draw, and to maintain line patency      Allergies    No Known Allergies    Intolerances        SOCIAL HISTORY:    FAMILY HISTORY:  No pertinent family history in first degree relatives        Vital Signs Last 24 Hrs  T(C): 36.6 (18 Jul 2023 14:00), Max: 36.9 (17 Jul 2023 21:57)  T(F): 97.8 (18 Jul 2023 14:00), Max: 98.4 (17 Jul 2023 21:57)  HR: 100 (18 Jul 2023 12:40) (84 - 100)  BP: 160/108 (18 Jul 2023 12:40) (153/107 - 169/107)  BP(mean): 129 (18 Jul 2023 12:40) (118 - 134)  RR: 18 (18 Jul 2023 12:40) (15 - 19)  SpO2: 100% (18 Jul 2023 12:40) (93% - 100%)    Parameters below as of 18 Jul 2023 12:40  Patient On (Oxygen Delivery Method): nasal cannula  O2 Flow (L/min): 2      PHYSICAL EXAM:  Gen: Chronically appearing gentleman. Awake, alert.   CV: HDS, WWP  Pulm: Breathing comfortably on 2L NC. no effort or distress.   Abd: S/nt/nd. R groin with pulsatile mass.   Ext: Trace edema to ankles. None above. No swelling or redness bilaterally.     LABS:                        7.9    11.10 )-----------( 454      ( 18 Jul 2023 15:54 )             24.5     07-18    140  |  105  |  13  ----------------------------<  83  4.4   |  25  |  0.68    Ca    8.3<L>      18 Jul 2023 05:30  Phos  4.6     07-18  Mg     1.7     07-18      PT/INR - ( 18 Jul 2023 15:54 )   PT: 16.6 sec;   INR: 1.39          PTT - ( 18 Jul 2023 15:54 )  PTT:34.5 sec  Urinalysis Basic - ( 18 Jul 2023 05:30 )    Color: x / Appearance: x / SG: x / pH: x  Gluc: 83 mg/dL / Ketone: x  / Bili: x / Urobili: x   Blood: x / Protein: x / Nitrite: x   Leuk Esterase: x / RBC: x / WBC x   Sq Epi: x / Non Sq Epi: x / Bacteria: x    RADIOLOGY & ADDITIONAL STUDIES    CT A/P       IMPRESSION:  1. Since 7/2/2023, there are filling defects in right-sided pulmonary arteries, consistent with pulmonary emboli. In retrospect, these pulmonary emboli have been present on prior studies dating back to 6/16/2023. No new pulmonary emboli are identified.    2. Multiple new wedge-shaped areas of diminished enhancement in the kidneys bilaterally. These are suspicious for infarcts. The differential diagnosis includes pyelonephritis.    3. Pneumatosis coli in the cecum. This raises the suspicion of ischemia.    4. New left upper lobe infiltrate in addition to right-sided infiltrates. The differential diagnosis includes ARDS, edema, infection, and hemorrhage.    5. The right ventricle is larger than the left ventricle. While this may be a sign of right heart strain, this finding has been present since the patient's first CT scan of 11/30/2022. The right heart enlargement may therefore be a sign of pulmonary hypertension.    6. No significant change large bilateral pleural effusions.    7. Improvement in the appearance of the pancreas.    8. No significant change in the appearance of the postoperative appearance of the thoracoabdominal aortic dissection and aneurysm.    9. Decrease in size of splenic infarct.    10. Anasarca.    Findings were discussed with Dr. Mildred Carter on 7/17/2023 11:52 AM by Dr. Wheat with read back confirmation.    A/P: 53M w/ PMHx type A dissection admitted initially for endoleak management now admitted for sequelae of necrotizing pancreatitis, found to have PE. Per primary team, impression is that the patient cannot be safely anticoagulated given bloody bowel movements, possibly secondary to colitis.     - Add on for IVC filter w/ Dr. Ellison  - Please make NPO at midnight     Discussed w/ chief.

## 2023-07-18 NOTE — CONSULT NOTE ADULT - SUBJECTIVE AND OBJECTIVE BOX
SURGERY CONSULT  ==============================================================================================================  HPI: 53y Male  HPI:  51 YO Male, current every day marijuana use, w/ PMHx of HTN, type A aortic dissection s/p Dacron grafts, AV resuspension in 2013, CAD s/p CABG x 1 SVG to RCA (5/2013 with Dr. Medina), seizure disorder (last episode on 7/4/22) who was admitted for surgical mgmt of progression of aneurysmal disease. Recent admission 3/20-4/14 during which patient underwent replacement of transverse aortic arch, second stage thoracic endovascular aortic repair, aorto-axillary bypass, AV replacement (bio 23mm), and CABG x 1 (SVG-RCA) EF 75% (Ignacio, 3/20). Post op cb lactic acidosis, severe cardiogenic and vasogenic shock, TREY requiring CVVHD and temporary dialysis requirement. Patient presented to [facility] ED 4/27 for syncope vs seizure episode at home, falling onto back of head. Nuerological workup proformed during that visit revealed a negative EEG, but given clinical picture of seizures, pt started on vimpat and depakote. Imaging preformed during that admission did no require acute surgical intervention on endoleak.   Pt presented to [facility] ED last night complaning of worsening epigastric pain. CTAP revealed continued endoleak. Sent to [facility] for further evaluation. Pt will be taken to the OR with Dr. Pierre this morning for a completion TEVAR. Pt consented, OR aware.         (05 May 2023 09:41)      PAST MEDICAL & SURGICAL HISTORY:  HTN (hypertension)      Aortic dissection      CAD (coronary artery disease)      Seizure disorder      Seizure disorder      Hypertension      Status post endovascular aneurysm repair (EVAR)      S/P aortic bifurcation bypass graft      S/P CABG x 1        Home Meds: Home Medications:  acetaminophen 325 mg oral tablet: 2 tab(s) orally every 6 hours (14 Jun 2023 15:13)  divalproex sodium 500 mg oral tablet, extended release: 2 tab(s) orally every 12 hours (14 Jun 2023 15:13)    Allergies: Allergies    No Known Allergies    Intolerances      Soc:   Advanced Directives: Presumed Full Code     CURRENT MEDICATIONS:   --------------------------------------------------------------------------------------  Neurologic Medications  HYDROmorphone  Injectable 0.5 milliGRAM(s) IV Push every 6 hours PRN Severe Pain (7 - 10)  lacosamide IVPB 200 milliGRAM(s) IV Intermittent every 12 hours  ondansetron Injectable 4 milliGRAM(s) IV Push every 8 hours PRN Nausea and/or Vomiting    Respiratory Medications  albuterol/ipratropium for Nebulization 3 milliLiter(s) Nebulizer every 6 hours    Cardiovascular Medications  metoprolol tartrate Injectable 5 milliGRAM(s) IV Push every 6 hours    Gastrointestinal Medications  loperamide Liquid 2 milliGRAM(s) Enteral Tube every 6 hours  multivitamin/minerals/iron Oral Solution (CENTRUM) 15 milliLiter(s) Oral daily  pantoprazole  Injectable 40 milliGRAM(s) IV Push daily  sodium chloride 0.9% lock flush 10 milliLiter(s) IV Push every 1 hour PRN Pre/post blood products, medications, blood draw, and to maintain line patency    Genitourinary Medications    Hematologic/Oncologic Medications  aspirin  chewable 81 milliGRAM(s) Oral daily    Antimicrobial/Immunologic Medications    Endocrine/Metabolic Medications  dextrose 50% Injectable 25 Gram(s) IV Push once  dextrose 50% Injectable 25 Gram(s) IV Push once  dextrose 50% Injectable 12.5 Gram(s) IV Push once  dextrose Oral Gel 15 Gram(s) Oral once  glucagon  Injectable 1 milliGRAM(s) IntraMuscular once    Topical/Other Medications  artificial  tears Solution 1 Drop(s) Both EYES two times a day  calamine/zinc oxide Lotion 1 Application(s) Topical two times a day  nystatin Powder 1 Application(s) Topical every 8 hours    --------------------------------------------------------------------------------------    VITAL SIGNS, INS/OUTS (last 24 hours):  --------------------------------------------------------------------------------------  ICU Vital Signs Last 24 Hrs  T(C): 36.6 (18 Jul 2023 14:00), Max: 36.9 (17 Jul 2023 21:57)  T(F): 97.8 (18 Jul 2023 14:00), Max: 98.4 (17 Jul 2023 21:57)  HR: 100 (18 Jul 2023 12:40) (84 - 100)  BP: 160/108 (18 Jul 2023 12:40) (153/107 - 169/107)  BP(mean): 129 (18 Jul 2023 12:40) (118 - 134)  ABP: --  ABP(mean): --  RR: 18 (18 Jul 2023 12:40) (15 - 19)  SpO2: 100% (18 Jul 2023 12:40) (93% - 100%)    O2 Parameters below as of 18 Jul 2023 12:40  Patient On (Oxygen Delivery Method): nasal cannula  O2 Flow (L/min): 2        I&O's Summary    17 Jul 2023 07:01  -  18 Jul 2023 07:00  --------------------------------------------------------  IN: 580 mL / OUT: 1950 mL / NET: -1370 mL    18 Jul 2023 07:01  -  18 Jul 2023 17:23  --------------------------------------------------------  IN: 90 mL / OUT: 0 mL / NET: 90 mL      --------------------------------------------------------------------------------------    EXAM:  General/Neuro  GCS:   Exam: Normal, NAD, alert, oriented x 3, no focal deficits. PERRLA  ***    Respiratory  Exam: Lungs clear to auscultation, Normal expansion/effort.  ***    Cardiovascular  Exam: S1, S2.  Regular rate and rhythm.  Peripheral edema  ***  Cardiac Rhythm: Normal Sinus Rhythm    GI  Exam: Abdomen soft, Non-tender, Non-distended.    Wound:   ***    Extremities  Exam: Extremities warm, pink, well-perfused.      Derm:  Exam: Good skin turgor, no skin breakdown.      LABS  --------------------------------------------------------------------------------------  Labs:  CAPILLARY BLOOD GLUCOSE                              7.9    11.10 )-----------( 454      ( 18 Jul 2023 15:54 )             24.5         07-18    140  |  105  |  13  ----------------------------<  83  4.4   |  25  |  0.68      Calcium: 8.3 mg/dL (07-18-23 @ 05:30)      LFTs:     Lactate, Blood: 1.1 mmol/L (07-17-23 @ 06:23)      Coags:     16.6   ----< 1.39    ( 18 Jul 2023 15:54 )     34.5                Urinalysis Basic - ( 18 Jul 2023 05:30 )    Color: x / Appearance: x / SG: x / pH: x  Gluc: 83 mg/dL / Ketone: x  / Bili: x / Urobili: x   Blood: x / Protein: x / Nitrite: x   Leuk Esterase: x / RBC: x / WBC x   Sq Epi: x / Non Sq Epi: x / Bacteria: x          --------------------------------------------------------------------------------------    OTHER LABS    IMAGING RESULTS  ****************      ASSESSMENT/ PLAN:  53y Male ***      Attending aware and agrees with plan   SURGERY CONSULT  ==============================================================================================================  HPI: 53y Male  HPI:  51 YO Male, current every day marijuana use, w/ PMHx of HTN, type A aortic dissection s/p Dacron grafts, AV resuspension in 2013, CAD s/p CABG x 1 SVG to RCA (5/2013 with Dr. Medina), seizure disorder (last episode on 7/4/22) who was admitted for surgical mgmt of progression of aneurysmal disease. Recent admission 3/20-4/14 during which patient underwent replacement of transverse aortic arch, second stage thoracic endovascular aortic repair, aorto-axillary bypass, AV replacement (bio 23mm), and CABG x 1 (SVG-RCA) EF 75% (Ignacio, 3/20). Post op cb lactic acidosis, severe cardiogenic and vasogenic shock, TREY requiring CVVHD and temporary dialysis requirement. Patient presented to [facility] ED 4/27 for syncope vs seizure episode at home, falling onto back of head. Nuerological workup proformed during that visit revealed a negative EEG, but given clinical picture of seizures, pt started on vimpat and depakote. Imaging preformed during that admission did no require acute surgical intervention on endoleak.   Pt presented to [facility] ED last night complaning of worsening epigastric pain. CTAP revealed continued endoleak. Sent to [facility] for further evaluation. Pt will be taken to the OR with Dr. Pierre this morning for a completion TEVAR. Pt consented, OR aware.     SICU ADDENDUM:  HPI as above. Patient well known to our service. Prolonged SICU stay in the past 2/2 hemorrhagic pancreatitis, recurrent aspiration and hypoxic respiratory failure s/p tracheostomy s/p decannulation. Most recently in SICU for aspiration PNA requiring intubation. S/P Meropenem and then extubated and stepped down to floor. Most recent CT scan revealing bilateral chronic PEs and pneumatosis of cecum. Patient on heparin gtt and now with blood per rectum. Heparin gtt stopped and patient stepped up to SICU for HD monitoring      PAST MEDICAL & SURGICAL HISTORY:  HTN (hypertension)      Aortic dissection      CAD (coronary artery disease)      Seizure disorder      Seizure disorder      Hypertension      Status post endovascular aneurysm repair (EVAR)      S/P aortic bifurcation bypass graft      S/P CABG x 1        Home Meds: Home Medications:  acetaminophen 325 mg oral tablet: 2 tab(s) orally every 6 hours (14 Jun 2023 15:13)  divalproex sodium 500 mg oral tablet, extended release: 2 tab(s) orally every 12 hours (14 Jun 2023 15:13)    Allergies: Allergies    No Known Allergies    Intolerances      Soc:   Advanced Directives: Presumed Full Code     CURRENT MEDICATIONS:   --------------------------------------------------------------------------------------  Neurologic Medications  HYDROmorphone  Injectable 0.5 milliGRAM(s) IV Push every 6 hours PRN Severe Pain (7 - 10)  lacosamide IVPB 200 milliGRAM(s) IV Intermittent every 12 hours  ondansetron Injectable 4 milliGRAM(s) IV Push every 8 hours PRN Nausea and/or Vomiting    Respiratory Medications  albuterol/ipratropium for Nebulization 3 milliLiter(s) Nebulizer every 6 hours    Cardiovascular Medications  metoprolol tartrate Injectable 5 milliGRAM(s) IV Push every 6 hours    Gastrointestinal Medications  loperamide Liquid 2 milliGRAM(s) Enteral Tube every 6 hours  multivitamin/minerals/iron Oral Solution (CENTRUM) 15 milliLiter(s) Oral daily  pantoprazole  Injectable 40 milliGRAM(s) IV Push daily  sodium chloride 0.9% lock flush 10 milliLiter(s) IV Push every 1 hour PRN Pre/post blood products, medications, blood draw, and to maintain line patency    Genitourinary Medications    Hematologic/Oncologic Medications  aspirin  chewable 81 milliGRAM(s) Oral daily    Antimicrobial/Immunologic Medications    Endocrine/Metabolic Medications  dextrose 50% Injectable 25 Gram(s) IV Push once  dextrose 50% Injectable 25 Gram(s) IV Push once  dextrose 50% Injectable 12.5 Gram(s) IV Push once  dextrose Oral Gel 15 Gram(s) Oral once  glucagon  Injectable 1 milliGRAM(s) IntraMuscular once    Topical/Other Medications  artificial  tears Solution 1 Drop(s) Both EYES two times a day  calamine/zinc oxide Lotion 1 Application(s) Topical two times a day  nystatin Powder 1 Application(s) Topical every 8 hours    --------------------------------------------------------------------------------------    VITAL SIGNS, INS/OUTS (last 24 hours):  --------------------------------------------------------------------------------------  ICU Vital Signs Last 24 Hrs  T(C): 36.6 (18 Jul 2023 14:00), Max: 36.9 (17 Jul 2023 21:57)  T(F): 97.8 (18 Jul 2023 14:00), Max: 98.4 (17 Jul 2023 21:57)  HR: 100 (18 Jul 2023 12:40) (84 - 100)  BP: 160/108 (18 Jul 2023 12:40) (153/107 - 169/107)  BP(mean): 129 (18 Jul 2023 12:40) (118 - 134)  RR: 18 (18 Jul 2023 12:40) (15 - 19)  SpO2: 100% (18 Jul 2023 12:40) (93% - 100%)    O2 Parameters below as of 18 Jul 2023 12:40  Patient On (Oxygen Delivery Method): nasal cannula  O2 Flow (L/min): 2        I&O's Summary    17 Jul 2023 07:01  -  18 Jul 2023 07:00  --------------------------------------------------------  IN: 580 mL / OUT: 1950 mL / NET: -1370 mL    18 Jul 2023 07:01  -  18 Jul 2023 17:23  --------------------------------------------------------  IN: 90 mL / OUT: 0 mL / NET: 90 mL      --------------------------------------------------------------------------------------    EXAM:  General: Resting comfortably in bed, NAD  Neuro: A&Ox3, normal speech  HEENT: ATNC  Pulmonary: Nonlabored breathing, no respiratory distress or accessory muscle noted  Cardiovascular: NSR  Abdomen: Soft, nondistended, nontender. J tube in place. Midline incision well healaed  Extremeties: WWP, No significant edema appreciated  EVAN: No joint swelling or erythema appreciated  Skin: Dry, no rashes  Psychiatric: Goal-oriented thought, normal affect    LABS  --------------------------------------------------------------------------------------  Labs:  CAPILLARY BLOOD GLUCOSE                              7.9    11.10 )-----------( 454      ( 18 Jul 2023 15:54 )             24.5         07-18    140  |  105  |  13  ----------------------------<  83  4.4   |  25  |  0.68      Calcium: 8.3 mg/dL (07-18-23 @ 05:30)      LFTs:     Lactate, Blood: 1.1 mmol/L (07-17-23 @ 06:23)      Coags:     16.6   ----< 1.39    ( 18 Jul 2023 15:54 )     34.5                Urinalysis Basic - ( 18 Jul 2023 05:30 )    Color: x / Appearance: x / SG: x / pH: x  Gluc: 83 mg/dL / Ketone: x  / Bili: x / Urobili: x   Blood: x / Protein: x / Nitrite: x   Leuk Esterase: x / RBC: x / WBC x   Sq Epi: x / Non Sq Epi: x / Bacteria: x

## 2023-07-18 NOTE — PROGRESS NOTE ADULT - SUBJECTIVE AND OBJECTIVE BOX
SUBJECTIVE: Pt seen and examined at bedside with chief. Admits breathing improved. Denies fever, chills, sob, difficulty breathing, cp. On NC this morning.    MEDICATIONS  (STANDING):  albuterol/ipratropium for Nebulization 3 milliLiter(s) Nebulizer every 6 hours  artificial  tears Solution 1 Drop(s) Both EYES two times a day  aspirin  chewable 81 milliGRAM(s) Oral daily  calamine/zinc oxide Lotion 1 Application(s) Topical two times a day  dextrose 50% Injectable 25 Gram(s) IV Push once  dextrose 50% Injectable 25 Gram(s) IV Push once  dextrose 50% Injectable 12.5 Gram(s) IV Push once  dextrose Oral Gel 15 Gram(s) Oral once  glucagon  Injectable 1 milliGRAM(s) IntraMuscular once  heparin  Infusion 1500 Unit(s)/Hr (15 mL/Hr) IV Continuous <Continuous>  lacosamide IVPB 200 milliGRAM(s) IV Intermittent every 12 hours  loperamide Liquid 2 milliGRAM(s) Enteral Tube every 6 hours  magnesium sulfate  IVPB 1 Gram(s) IV Intermittent once  metoprolol tartrate Injectable 5 milliGRAM(s) IV Push every 6 hours  multivitamin/minerals/iron Oral Solution (CENTRUM) 15 milliLiter(s) Oral daily  nystatin Powder 1 Application(s) Topical every 8 hours  pantoprazole  Injectable 40 milliGRAM(s) IV Push daily    MEDICATIONS  (PRN):  HYDROmorphone  Injectable 0.5 milliGRAM(s) IV Push every 6 hours PRN Severe Pain (7 - 10)  ondansetron Injectable 4 milliGRAM(s) IV Push every 8 hours PRN Nausea and/or Vomiting  sodium chloride 0.9% lock flush 10 milliLiter(s) IV Push every 1 hour PRN Pre/post blood products, medications, blood draw, and to maintain line patency      Vital Signs Last 24 Hrs  T(C): 36.6 (18 Jul 2023 05:15), Max: 36.9 (17 Jul 2023 21:57)  T(F): 97.8 (18 Jul 2023 05:15), Max: 98.4 (17 Jul 2023 21:57)  HR: 92 (18 Jul 2023 06:55) (80 - 100)  BP: 153/107 (18 Jul 2023 06:05) (140/104 - 166/109)  BP(mean): 126 (18 Jul 2023 06:05) (115 - 133)  RR: 18 (18 Jul 2023 06:55) (15 - 19)  SpO2: 98% (18 Jul 2023 06:55) (93% - 100%)    Parameters below as of 18 Jul 2023 06:55  Patient On (Oxygen Delivery Method): room air        PHYSICAL EXAM:      Constitutional: A&Ox3    Respiratory: non labored breathing, no respiratory distress    Cardiovascular: NSR, RRR    Gastrointestinal: Soft ND, NT                 Incision: Healing well    Genitourinary: voiding w/ condom cath    Extremities: (-) edema                  I&O's Detail    17 Jul 2023 07:01  -  18 Jul 2023 07:00  --------------------------------------------------------  IN:    Enteral Tube Flush: 360 mL    Heparin: 205 mL    Heparin Infusion: 15 mL  Total IN: 580 mL    OUT:    Voided (mL): 1950 mL  Total OUT: 1950 mL    Total NET: -1370 mL          LABS:                        8.0    10.82 )-----------( 464      ( 18 Jul 2023 05:30 )             25.6     07-18    140  |  105  |  13  ----------------------------<  83  4.4   |  25  |  0.68    Ca    8.3<L>      18 Jul 2023 05:30  Phos  4.6     07-18  Mg     1.7     07-18      PT/INR - ( 17 Jul 2023 06:23 )   PT: 16.5 sec;   INR: 1.38          PTT - ( 18 Jul 2023 04:24 )  PTT:48.5 sec  Urinalysis Basic - ( 18 Jul 2023 05:30 )    Color: x / Appearance: x / SG: x / pH: x  Gluc: 83 mg/dL / Ketone: x  / Bili: x / Urobili: x   Blood: x / Protein: x / Nitrite: x   Leuk Esterase: x / RBC: x / WBC x   Sq Epi: x / Non Sq Epi: x / Bacteria: x        RADIOLOGY & ADDITIONAL STUDIES:

## 2023-07-18 NOTE — CONSULT NOTE ADULT - ASSESSMENT
A/p: 53M w PMHx of HTN, type A aortic dissection s/p Dacron grafts, AV resuspension in 2013, CAD s/p CABG x 1 SVG to RCA (5/2013 with Dr. Medina), seizure disorder, recent admission 3/20-4/14 for replacement of transverse aortic arch, second stage TEVAR and aorto-axillary bypass, AV replacement (bio 23mm), and CABG x 1 (SVG-RCA). Pt found to have endo leak and taken to OR for TEVAR revision on 5/5. Post op course c/b Klebsiella bacteremia (5/15), resp failure requiring intubation on 5/18, Mucus plugging s/p Bronch 5/19 and PEA arrest. Post operatively pt also found to have hemorrhagic pancreatitis with venu-pancreatic abscess possibly 2* to Depakote therefore s/p IR aspiration of peripancreatic fluid collection and paracentesis on 5/22, IR venu-pancreatic abscess drainage and placement of drainage catheter on 5/24. Pt then transferred from CTICU to SICU for septic shock, and further mgmt of hemorrhagic pancreatitis on 5/25. s/p IR placement of 2 new drainage catheters and catheter exchange on 5/26. LUQ drainage 5/30. OR 5/31 for exlap washout & replacement of 3 new JPs and abthera vac with open abdomen. RTOR 6/1, no bleeding, evac of old blood, placement of feeding J-tube, failed extubation due to PNA, completed ABX course and s/p trach 6/5 and decannulation 6/28. stepped down, but returned to SICU (7/2) for respiratory distress. Emergently intubated (7/3) for aspiration pneumonia, sputum cx grew pseudomonas. Extubated on 7/9. Recent CT with B/L chornic pulmonary embolisms and pneumatosis of cecum. Started on heparin gtt - stopped 2/2 to blood per rectum. Stepped up to SICU for HD monitoring.  NEURO: PRN Oxycodone for pain. Hx seizures: cont vimpat. Off depakote due to concern for drug-related pancreatitis risk.   HEENT: Artificial tears, Torticollis improved  CV: s/p PEA arrest on 5/24 night. TTE (6/14) LVEF 75%, LVH, biatrial enlargement and elevated PA pressure (elevated from previous), mild to mod MR/TR . Cont ASA 81mg. Continue metoprolol today 50 BID.    PULM: Aspiration PNA: S/p Reintubation on 7/3 and extubation on 7/9 - Cont Duonebs. Hx of recent ARDS/PNA resolved and trach decannulated 6/28. Recent CT with b/l chornic pulmonary embolisms - started on heparin gtt - d/c'ed 2/2 to blood in stool. Saturating well on RA.  GI/FEN: Currently NPO pending IVC filter placement. Cleared by S&S for minced and moist with thin liquids. No signs of aspiration. Holding TF Vital 1.0 Imdoium PRN for diarrhea. Protonix daily. Initial presentation with hemorrhagic pancreatitis: s/p multiple drain placements and paracentisis by IR team.   : acute renal failure- been off CVVHD since mid June with renal recovery. Voids.  HEME: Stepped up to ICU for blood per rectum. q6 CBC - most recent hb 7.9 (8)  ENDO: mISS  ID: Sepsis without shock, Pseudomonas in Sputum: on Meropenem Completed ( 7/7-7/11) as per Surgical team request.     - PRIOR ABX: Ertapenem (6/13-6/16, 6/18-6/23), meropenem (5/21-6/5, 6/9-6/13, 7/2-7/5), vanco (6/9, 6/18-6/22, 7/2-7/5), caspofungin (5/23-5/30, 6/9-6/12, 6/18-6/21), Ceftriaxone( 6/5- 6/15), Ampicillin (5/21- 5/27). Zosyn: ( 7/- 7/7)   - Prior Cultures: Kleb bacteremia from 5/15 & 5/17. bronch cx kleb, enterobacter (zosyn, erta resistant), E faecalis, strep angionosus from 5/19, pancreatic abscess cx pan-sensitive kleb 5/22. Now off all antibiotics   PPX: SCDs, SQH  LINES: PIVs   WOUNDS/DRAINS: J tube  Dispo: SICU

## 2023-07-18 NOTE — CHART NOTE - NSCHARTNOTEFT_GEN_A_CORE
Pre-op Diagnosis: PE  Procedure: IVC filter  Surgeon: Terra    Consent: In chart                          7.9    11.10 )-----------( 454      ( 18 Jul 2023 15:54 )             24.5     07-18    140  |  105  |  13  ----------------------------<  83  4.4   |  25  |  0.68    Ca    8.3<L>      18 Jul 2023 05:30  Phos  4.6     07-18  Mg     1.7     07-18      PT/INR - ( 18 Jul 2023 15:54 )   PT: 16.6 sec;   INR: 1.39          PTT - ( 18 Jul 2023 15:54 )  PTT:34.5 sec  Urinalysis Basic - ( 18 Jul 2023 05:30 )    Color: x / Appearance: x / SG: x / pH: x  Gluc: 83 mg/dL / Ketone: x  / Bili: x / Urobili: x   Blood: x / Protein: x / Nitrite: x   Leuk Esterase: x / RBC: x / WBC x   Sq Epi: x / Non Sq Epi: x / Bacteria: x        Type & Screen:  Available        (*With most recent within 72hrs of OR)  CXR: In chart, reviewed  EKG: In chart  UA: Not indicated    Is patient on ACE/ARB? [x]No [ ]Yes   *If yes, please hold any ACE/ARB the day of surgery    Is patient on Lantus at bedtime?  [x]No [ ]Yes   *If yes, please half the dose the night before OR since patient will be NPO    Does patient have a contrast allergy? [x]No [ ]Yes  *If yes, please pre-medicate per protocol    Is patient on anticoagulation? [x]No [ ] Yes  *If yes, please discuss with team when to hold it    Is the patient Female and <56yo [x]No [ ] Yes  If yes, pregnancy test must be documented in the chart    Is patient on dialysis? [x]No [ ]Yes  *If yes, please obtain all labs including K level EARLY the day of surgery   *Also, will NOT require IVF past midnight    A/P: 53yMale pre-op for above procedure  1. NPO past midnight, except medications  2. IVF per primary  3. [x] Blood on hold, Units: 2

## 2023-07-18 NOTE — PROGRESS NOTE ADULT - SUBJECTIVE AND OBJECTIVE BOX
SUBJECTIVE / INTERVAL HPI: Patient seen and examined at bedside.  Patient opens eyes to pain and stimuli, not fully following commands.     VITAL SIGNS:  Vital Signs Last 24 Hrs  T(C): 36.4 (18 Jul 2023 09:10), Max: 36.9 (17 Jul 2023 21:57)  T(F): 97.6 (18 Jul 2023 09:10), Max: 98.4 (17 Jul 2023 21:57)  HR: 90 (18 Jul 2023 09:15) (84 - 100)  BP: 158/108 (18 Jul 2023 09:15) (153/107 - 166/109)  BP(mean): 128 (18 Jul 2023 09:15) (117 - 133)  RR: 17 (18 Jul 2023 09:15) (15 - 19)  SpO2: 100% (18 Jul 2023 09:15) (93% - 100%)    Parameters below as of 18 Jul 2023 09:15  Patient On (Oxygen Delivery Method): nasal cannula  O2 Flow (L/min): 2      PHYSICAL EXAM:    General: WDWN  HEENT: NC/AT; PERRL, anicteric sclera; MMM  Neck: JVD appreciated   Cardiovascular: +S1/S2; RRR  Respiratory: decreased breath sounds b/l   Gastrointestinal: soft, NT/ND; +BSx4  Extremities: WWP; no edema, clubbing or cyanosis  Vascular: 2+ radial, DP/PT pulses B/L  Neurological: patient minimally responsive to pain and withdraws from stimuli     MEDICATIONS:  MEDICATIONS  (STANDING):  albuterol/ipratropium for Nebulization 3 milliLiter(s) Nebulizer every 6 hours  artificial  tears Solution 1 Drop(s) Both EYES two times a day  aspirin  chewable 81 milliGRAM(s) Oral daily  calamine/zinc oxide Lotion 1 Application(s) Topical two times a day  dextrose 50% Injectable 12.5 Gram(s) IV Push once  dextrose 50% Injectable 25 Gram(s) IV Push once  dextrose 50% Injectable 25 Gram(s) IV Push once  dextrose Oral Gel 15 Gram(s) Oral once  glucagon  Injectable 1 milliGRAM(s) IntraMuscular once  heparin  Infusion 1500 Unit(s)/Hr (15 mL/Hr) IV Continuous <Continuous>  lacosamide IVPB 200 milliGRAM(s) IV Intermittent every 12 hours  loperamide Liquid 2 milliGRAM(s) Enteral Tube every 6 hours  metoprolol tartrate Injectable 5 milliGRAM(s) IV Push every 6 hours  multivitamin/minerals/iron Oral Solution (CENTRUM) 15 milliLiter(s) Oral daily  nystatin Powder 1 Application(s) Topical every 8 hours  pantoprazole  Injectable 40 milliGRAM(s) IV Push daily    MEDICATIONS  (PRN):  HYDROmorphone  Injectable 0.5 milliGRAM(s) IV Push every 6 hours PRN Severe Pain (7 - 10)  ondansetron Injectable 4 milliGRAM(s) IV Push every 8 hours PRN Nausea and/or Vomiting  sodium chloride 0.9% lock flush 10 milliLiter(s) IV Push every 1 hour PRN Pre/post blood products, medications, blood draw, and to maintain line patency      ALLERGIES:  Allergies    No Known Allergies    Intolerances        LABS:                        8.0    10.82 )-----------( 464      ( 18 Jul 2023 05:30 )             25.6     07-18    140  |  105  |  13  ----------------------------<  83  4.4   |  25  |  0.68    Ca    8.3<L>      18 Jul 2023 05:30  Phos  4.6     07-18  Mg     1.7     07-18      PT/INR - ( 17 Jul 2023 06:23 )   PT: 16.5 sec;   INR: 1.38          PTT - ( 18 Jul 2023 10:35 )  PTT:72.7 sec  Urinalysis Basic - ( 18 Jul 2023 05:30 )    Color: x / Appearance: x / SG: x / pH: x  Gluc: 83 mg/dL / Ketone: x  / Bili: x / Urobili: x   Blood: x / Protein: x / Nitrite: x   Leuk Esterase: x / RBC: x / WBC x   Sq Epi: x / Non Sq Epi: x / Bacteria: x      CAPILLARY BLOOD GLUCOSE          RADIOLOGY & ADDITIONAL TESTS: Reviewed.    ASSESSMENT:    PLAN:

## 2023-07-19 ENCOUNTER — TRANSCRIPTION ENCOUNTER (OUTPATIENT)
Age: 53
End: 2023-07-19

## 2023-07-19 LAB
ANION GAP SERPL CALC-SCNC: 12 MMOL/L — SIGNIFICANT CHANGE UP (ref 5–17)
APTT BLD: 34.9 SEC — SIGNIFICANT CHANGE UP (ref 27.5–35.5)
BUN SERPL-MCNC: 12 MG/DL — SIGNIFICANT CHANGE UP (ref 7–23)
CALCIUM SERPL-MCNC: 8.2 MG/DL — LOW (ref 8.4–10.5)
CHLORIDE SERPL-SCNC: 105 MMOL/L — SIGNIFICANT CHANGE UP (ref 96–108)
CO2 SERPL-SCNC: 21 MMOL/L — LOW (ref 22–31)
CREAT SERPL-MCNC: 0.59 MG/DL — SIGNIFICANT CHANGE UP (ref 0.5–1.3)
EGFR: 115 ML/MIN/1.73M2 — SIGNIFICANT CHANGE UP
GLUCOSE SERPL-MCNC: 75 MG/DL — SIGNIFICANT CHANGE UP (ref 70–99)
HCT VFR BLD CALC: 23.9 % — LOW (ref 39–50)
HCT VFR BLD CALC: 25.9 % — LOW (ref 39–50)
HGB BLD-MCNC: 7.5 G/DL — LOW (ref 13–17)
HGB BLD-MCNC: 8.1 G/DL — LOW (ref 13–17)
INR BLD: 1.38 — HIGH (ref 0.88–1.16)
MAGNESIUM SERPL-MCNC: 1.8 MG/DL — SIGNIFICANT CHANGE UP (ref 1.6–2.6)
MCHC RBC-ENTMCNC: 29.5 PG — SIGNIFICANT CHANGE UP (ref 27–34)
MCHC RBC-ENTMCNC: 29.5 PG — SIGNIFICANT CHANGE UP (ref 27–34)
MCHC RBC-ENTMCNC: 31.3 GM/DL — LOW (ref 32–36)
MCHC RBC-ENTMCNC: 31.4 GM/DL — LOW (ref 32–36)
MCV RBC AUTO: 94.1 FL — SIGNIFICANT CHANGE UP (ref 80–100)
MCV RBC AUTO: 94.2 FL — SIGNIFICANT CHANGE UP (ref 80–100)
NRBC # BLD: 0 /100 WBCS — SIGNIFICANT CHANGE UP (ref 0–0)
NRBC # BLD: 0 /100 WBCS — SIGNIFICANT CHANGE UP (ref 0–0)
PHOSPHATE SERPL-MCNC: 3.7 MG/DL — SIGNIFICANT CHANGE UP (ref 2.5–4.5)
PLATELET # BLD AUTO: 433 K/UL — HIGH (ref 150–400)
PLATELET # BLD AUTO: 452 K/UL — HIGH (ref 150–400)
POTASSIUM SERPL-MCNC: 3.7 MMOL/L — SIGNIFICANT CHANGE UP (ref 3.5–5.3)
POTASSIUM SERPL-SCNC: 3.7 MMOL/L — SIGNIFICANT CHANGE UP (ref 3.5–5.3)
PROTHROM AB SERPL-ACNC: 16.5 SEC — HIGH (ref 10.5–13.4)
RBC # BLD: 2.54 M/UL — LOW (ref 4.2–5.8)
RBC # BLD: 2.75 M/UL — LOW (ref 4.2–5.8)
RBC # FLD: 17.6 % — HIGH (ref 10.3–14.5)
RBC # FLD: 18 % — HIGH (ref 10.3–14.5)
SODIUM SERPL-SCNC: 138 MMOL/L — SIGNIFICANT CHANGE UP (ref 135–145)
WBC # BLD: 8.46 K/UL — SIGNIFICANT CHANGE UP (ref 3.8–10.5)
WBC # BLD: 9.23 K/UL — SIGNIFICANT CHANGE UP (ref 3.8–10.5)
WBC # FLD AUTO: 8.46 K/UL — SIGNIFICANT CHANGE UP (ref 3.8–10.5)
WBC # FLD AUTO: 9.23 K/UL — SIGNIFICANT CHANGE UP (ref 3.8–10.5)

## 2023-07-19 PROCEDURE — 99232 SBSQ HOSP IP/OBS MODERATE 35: CPT | Mod: GC

## 2023-07-19 PROCEDURE — 37191 INS ENDOVAS VENA CAVA FILTR: CPT | Mod: GC

## 2023-07-19 DEVICE — CATH ANG BERENSTN 5FRX65CM: Type: IMPLANTABLE DEVICE | Status: FUNCTIONAL

## 2023-07-19 DEVICE — GWIRE BENTSON 0.035INX180CM: Type: IMPLANTABLE DEVICE | Status: FUNCTIONAL

## 2023-07-19 DEVICE — FILTER VENA CAVA FEMORAL: Type: IMPLANTABLE DEVICE | Status: FUNCTIONAL

## 2023-07-19 RX ORDER — BENZOCAINE AND MENTHOL 5; 1 G/100ML; G/100ML
1 LIQUID ORAL EVERY 6 HOURS
Refills: 0 | Status: DISCONTINUED | OUTPATIENT
Start: 2023-07-19 | End: 2023-08-03

## 2023-07-19 RX ORDER — ASPIRIN/CALCIUM CARB/MAGNESIUM 324 MG
81 TABLET ORAL DAILY
Refills: 0 | Status: DISCONTINUED | OUTPATIENT
Start: 2023-07-19 | End: 2023-07-19

## 2023-07-19 RX ORDER — MULTIVIT-MIN/FERROUS GLUCONATE 9 MG/15 ML
15 LIQUID (ML) ORAL DAILY
Refills: 0 | Status: DISCONTINUED | OUTPATIENT
Start: 2023-07-19 | End: 2023-08-01

## 2023-07-19 RX ORDER — IPRATROPIUM/ALBUTEROL SULFATE 18-103MCG
3 AEROSOL WITH ADAPTER (GRAM) INHALATION ONCE
Refills: 0 | Status: COMPLETED | OUTPATIENT
Start: 2023-07-19 | End: 2023-07-19

## 2023-07-19 RX ORDER — METOPROLOL TARTRATE 50 MG
5 TABLET ORAL EVERY 6 HOURS
Refills: 0 | Status: DISCONTINUED | OUTPATIENT
Start: 2023-07-19 | End: 2023-07-20

## 2023-07-19 RX ORDER — ACETYLCYSTEINE 200 MG/ML
4 VIAL (ML) MISCELLANEOUS ONCE
Refills: 0 | Status: COMPLETED | OUTPATIENT
Start: 2023-07-19 | End: 2023-07-19

## 2023-07-19 RX ORDER — HYDRALAZINE HCL 50 MG
5 TABLET ORAL ONCE
Refills: 0 | Status: COMPLETED | OUTPATIENT
Start: 2023-07-19 | End: 2023-07-19

## 2023-07-19 RX ORDER — POTASSIUM CHLORIDE 20 MEQ
40 PACKET (EA) ORAL ONCE
Refills: 0 | Status: COMPLETED | OUTPATIENT
Start: 2023-07-19 | End: 2023-07-19

## 2023-07-19 RX ORDER — ONDANSETRON 8 MG/1
4 TABLET, FILM COATED ORAL EVERY 8 HOURS
Refills: 0 | Status: DISCONTINUED | OUTPATIENT
Start: 2023-07-19 | End: 2023-08-03

## 2023-07-19 RX ORDER — LABETALOL HCL 100 MG
10 TABLET ORAL ONCE
Refills: 0 | Status: COMPLETED | OUTPATIENT
Start: 2023-07-19 | End: 2023-07-19

## 2023-07-19 RX ORDER — LOPERAMIDE HCL 2 MG
2 TABLET ORAL EVERY 6 HOURS
Refills: 0 | Status: DISCONTINUED | OUTPATIENT
Start: 2023-07-19 | End: 2023-07-24

## 2023-07-19 RX ORDER — CHLORHEXIDINE GLUCONATE 213 G/1000ML
1 SOLUTION TOPICAL
Refills: 0 | Status: DISCONTINUED | OUTPATIENT
Start: 2023-07-19 | End: 2023-08-03

## 2023-07-19 RX ORDER — MAGNESIUM SULFATE 500 MG/ML
1 VIAL (ML) INJECTION ONCE
Refills: 0 | Status: COMPLETED | OUTPATIENT
Start: 2023-07-19 | End: 2023-07-19

## 2023-07-19 RX ORDER — LACOSAMIDE 50 MG/1
200 TABLET ORAL EVERY 12 HOURS
Refills: 0 | Status: DISCONTINUED | OUTPATIENT
Start: 2023-07-19 | End: 2023-07-29

## 2023-07-19 RX ORDER — ASPIRIN/CALCIUM CARB/MAGNESIUM 324 MG
81 TABLET ORAL DAILY
Refills: 0 | Status: DISCONTINUED | OUTPATIENT
Start: 2023-07-19 | End: 2023-08-03

## 2023-07-19 RX ORDER — CALAMINE AND ZINC OXIDE AND PHENOL 160; 10 MG/ML; MG/ML
1 LOTION TOPICAL
Refills: 0 | Status: DISCONTINUED | OUTPATIENT
Start: 2023-07-19 | End: 2023-08-03

## 2023-07-19 RX ORDER — PANTOPRAZOLE SODIUM 20 MG/1
40 TABLET, DELAYED RELEASE ORAL DAILY
Refills: 0 | Status: DISCONTINUED | OUTPATIENT
Start: 2023-07-19 | End: 2023-07-29

## 2023-07-19 RX ORDER — ACETAMINOPHEN 500 MG
1000 TABLET ORAL ONCE
Refills: 0 | Status: COMPLETED | OUTPATIENT
Start: 2023-07-19 | End: 2023-07-19

## 2023-07-19 RX ADMIN — Medication 1000 MILLIGRAM(S): at 01:19

## 2023-07-19 RX ADMIN — Medication 15 MILLILITER(S): at 17:44

## 2023-07-19 RX ADMIN — CHLORHEXIDINE GLUCONATE 1 APPLICATION(S): 213 SOLUTION TOPICAL at 06:27

## 2023-07-19 RX ADMIN — Medication 100 GRAM(S): at 07:43

## 2023-07-19 RX ADMIN — Medication 5 MILLIGRAM(S): at 00:05

## 2023-07-19 RX ADMIN — LACOSAMIDE 140 MILLIGRAM(S): 50 TABLET ORAL at 20:12

## 2023-07-19 RX ADMIN — Medication 5 MILLIGRAM(S): at 01:42

## 2023-07-19 RX ADMIN — Medication 10 MILLIGRAM(S): at 23:09

## 2023-07-19 RX ADMIN — Medication 3 MILLILITER(S): at 05:32

## 2023-07-19 RX ADMIN — NYSTATIN CREAM 1 APPLICATION(S): 100000 CREAM TOPICAL at 00:19

## 2023-07-19 RX ADMIN — NYSTATIN CREAM 1 APPLICATION(S): 100000 CREAM TOPICAL at 06:25

## 2023-07-19 RX ADMIN — Medication 3 MILLILITER(S): at 01:31

## 2023-07-19 RX ADMIN — HYDROMORPHONE HYDROCHLORIDE 0.5 MILLIGRAM(S): 2 INJECTION INTRAMUSCULAR; INTRAVENOUS; SUBCUTANEOUS at 07:00

## 2023-07-19 RX ADMIN — LACOSAMIDE 140 MILLIGRAM(S): 50 TABLET ORAL at 07:20

## 2023-07-19 RX ADMIN — Medication 400 MILLIGRAM(S): at 00:32

## 2023-07-19 RX ADMIN — Medication 81 MILLIGRAM(S): at 17:40

## 2023-07-19 RX ADMIN — Medication 400 MILLIGRAM(S): at 22:47

## 2023-07-19 RX ADMIN — Medication 4 MILLILITER(S): at 19:42

## 2023-07-19 RX ADMIN — Medication 40 MILLIEQUIVALENT(S): at 07:42

## 2023-07-19 RX ADMIN — CALAMINE AND ZINC OXIDE AND PHENOL 1 APPLICATION(S): 160; 10 LOTION TOPICAL at 06:26

## 2023-07-19 RX ADMIN — HYDROMORPHONE HYDROCHLORIDE 0.5 MILLIGRAM(S): 2 INJECTION INTRAMUSCULAR; INTRAVENOUS; SUBCUTANEOUS at 06:25

## 2023-07-19 RX ADMIN — Medication 1 DROP(S): at 06:26

## 2023-07-19 RX ADMIN — Medication 5 MILLIGRAM(S): at 17:41

## 2023-07-19 RX ADMIN — Medication 2 MILLIGRAM(S): at 23:09

## 2023-07-19 RX ADMIN — CALAMINE AND ZINC OXIDE AND PHENOL 1 APPLICATION(S): 160; 10 LOTION TOPICAL at 19:42

## 2023-07-19 RX ADMIN — PANTOPRAZOLE SODIUM 40 MILLIGRAM(S): 20 TABLET, DELAYED RELEASE ORAL at 17:43

## 2023-07-19 RX ADMIN — Medication 5 MILLIGRAM(S): at 23:11

## 2023-07-19 RX ADMIN — Medication 1000 MILLIGRAM(S): at 23:15

## 2023-07-19 RX ADMIN — Medication 5 MILLIGRAM(S): at 06:25

## 2023-07-19 RX ADMIN — Medication 3 MILLILITER(S): at 23:08

## 2023-07-19 RX ADMIN — Medication 2 MILLIGRAM(S): at 00:05

## 2023-07-19 NOTE — BRIEF OPERATIVE NOTE - NSICDXBRIEFPROCEDURE_GEN_ALL_CORE_FT
PROCEDURES:  Percutaneous needle tracheostomy 05-Jun-2023 17:54:21  Sacha Farr  
PROCEDURES:  Exploratory laparotomy 31-May-2023 11:36:09  Dorina Plummer  
PROCEDURES:  Exploratory laparotomy 31-May-2023 11:36:09  Dorina Plummer  Jejunostomy feeding tube placement 01-Jun-2023 14:31:31  Tom Melton  EGD 01-Jun-2023 14:33:41  Tom Melton  
PROCEDURES:  Second stage thoracic endovascular aortic repair (TEVAR) 05-May-2023 14:56:19  Rai Tran  
PROCEDURES:  IVC filter placement 19-Jul-2023 16:08:43  Edu Chase

## 2023-07-19 NOTE — PROGRESS NOTE ADULT - SUBJECTIVE AND OBJECTIVE BOX
Interval Events:  transferred to SICU for HD monitoring. Lactate 0.9; Labetolol 10 given x 1 for 170/110. MN Hgb 7.5 (7.9), dc'd ASA; OLY - soft, nt, nd. /100. Hydralazine 5 mg given IVP.    Patient seen and examined at bedside.      Allergies    No Known Allergies    Intolerances        Vital Signs Last 24 Hrs  T(C): 36.8 (19 Jul 2023 05:11), Max: 37.1 (19 Jul 2023 00:33)  T(F): 98.2 (19 Jul 2023 05:11), Max: 98.7 (19 Jul 2023 00:33)  HR: 96 (19 Jul 2023 06:25) (84 - 108)  BP: 173/104 (19 Jul 2023 06:25) (131/80 - 177/108)  BP(mean): 133 (19 Jul 2023 06:25) (100 - 135)  RR: 30 (19 Jul 2023 06:25) (14 - 30)  SpO2: 100% (19 Jul 2023 06:25) (94% - 100%)    Parameters below as of 19 Jul 2023 06:05  Patient On (Oxygen Delivery Method): BiPAP/CPAP    O2 Concentration (%): 40    07-18 @ 07:01  -  07-19 @ 07:00  --------------------------------------------------------  IN: 240 mL / OUT: 1250 mL / NET: -1010 mL      07-18 @ 07:01  -  07-19 @ 07:00  --------------------------------------------------------  IN: 240 mL / OUT: 1250 mL / NET: -1010 mL        Physical Exam:     General: Resting comfortably in bed, NAD  Neuro: A&Ox3, normal speech  HEENT: ATNC  Pulmonary: CTA b/l no w/r/r + Rales and decreased BS in bases  Cardiovascular: NSR Reg s1 s2 no M  Abdomen: Soft, nondistended, nontender. J tube in place. Midline incision well healaed  Extremeties: WWP, No significant edema appreciated  EVAN: No joint swelling or erythema appreciated  Skin: Dry, no rashes  Psychiatric: Normal affect      LABS:      CBC Full  -  ( 19 Jul 2023 05:30 )  WBC Count : 8.46 K/uL  RBC Count : 2.75 M/uL  Hemoglobin : 8.1 g/dL  Hematocrit : 25.9 %  Platelet Count - Automated : 452 K/uL  Mean Cell Volume : 94.2 fl  Mean Cell Hemoglobin : 29.5 pg  Mean Cell Hemoglobin Concentration : 31.3 gm/dL  Auto Neutrophil # : x  Auto Lymphocyte # : x  Auto Monocyte # : x  Auto Eosinophil # : x  Auto Basophil # : x  Auto Neutrophil % : x  Auto Lymphocyte % : x  Auto Monocyte % : x  Auto Eosinophil % : x  Auto Basophil % : x    07-19    138  |  105  |  12  ----------------------------<  75  3.7   |  21<L>  |  0.59    Ca    8.2<L>      19 Jul 2023 05:30  Phos  3.7     07-19  Mg     1.8     07-19      PT/INR - ( 19 Jul 2023 05:30 )   PT: 16.5 sec;   INR: 1.38          PTT - ( 19 Jul 2023 05:30 )  PTT:34.9 sec      Urinalysis Basic - ( 19 Jul 2023 05:30 )    Color: x / Appearance: x / SG: x / pH: x  Gluc: 75 mg/dL / Ketone: x  / Bili: x / Urobili: x   Blood: x / Protein: x / Nitrite: x   Leuk Esterase: x / RBC: x / WBC x   Sq Epi: x / Non Sq Epi: x / Bacteria: x              RADIOLOGY & ADDITIONAL STUDIES (The following images were personally reviewed):          A/p: A/p: 53M w PMHx of HTN, type A aortic dissection s/p Dacron grafts, AV resuspension in 2013, CAD s/p CABG x 1 SVG to RCA (5/2013 with Dr. Medina), seizure disorder, recent admission 3/20-4/14 for replacement of transverse aortic arch, second stage TEVAR and aorto-axillary bypass, AV replacement (bio 23mm), and CABG x 1 (SVG-RCA). Pt found to have endo leak and taken to OR for TEVAR revision on 5/5. Post op course c/b Klebsiella bacteremia (5/15), resp failure requiring intubation on 5/18, Mucus plugging s/p Bronch 5/19 and PEA arrest. Post operatively pt also found to have hemorrhagic pancreatitis with venu-pancreatic abscess possibly 2* to Depakote therefore s/p IR aspiration of peripancreatic fluid collection and paracentesis on 5/22, IR venu-pancreatic abscess drainage and placement of drainage catheter on 5/24. Pt then transferred from CTICU to SICU for septic shock, and further mgmt of hemorrhagic pancreatitis on 5/25. s/p IR placement of 2 new drainage catheters and catheter exchange on 5/26. LUQ drainage 5/30. OR 5/31 for exlap washout & replacement of 3 new JPs and abthera vac with open abdomen. RTOR 6/1, no bleeding, evac of old blood, placement of feeding J-tube, failed extubation due to PNA, completed ABX course and s/p trach 6/5 and decannulation 6/28. stepped down, but returned to SICU (7/2) for respiratory distress. Emergently intubated (7/3) for aspiration pneumonia, sputum cx grew pseudomonas. Extubated on 7/9. Recent CT with B/L chornic pulmonary embolisms and pneumatosis of cecum. Started on heparin gtt - stopped 2/2 to blood per rectum. Stepped up to SICU for HD monitoring.  NEURO: PRN Dilaudid  for pain. Hx seizures: cont vimpat. Off depakote due to concern for drug-related pancreatitis risk.   HEENT: Artificial tears, Torticollis improved  CV: s/p PEA arrest on 5/24 night. TTE (6/14) LVEF 75%, LVH, biatrial enlargement and elevated PA pressure (elevated from previous), mild to mod MR/TR . Holding ASA 81mg while concerned for bleeding. Continue metoprolol 5mg IVP [id number] while NPO  PULM: Aspiration PNA: S/p Reintubation on 7/3 and extubation on 7/9 - Cont Duonebs. Hx of recent ARDS/PNA resolved and trach decannulated 6/28. Recent CT with b/l chornic pulmonary embolisms - started on heparin gtt - d/c'ed 2/2 to blood in stool. Saturating well on RA.  GI/FEN: Currently NPO pending IVC filter placement. Cleared by S&S for minced and moist with thin liquids. GERD: Cont PPI  No signs of aspiration. Holding TF Vital 1.0 Imdoium PRN for diarrhea. Protonix daily. Initial presentation with hemorrhagic pancreatitis: s/p multiple drain placements and paracentisis by IR team.   : acute renal failure- been off CVVHD since mid June with renal recovery. Voids.  HEME: Stepped up to ICU for blood per rectum. q6 CBC - most recent hb 7.9 (8)  ENDO: mISS  ID: Fungal infection: Cont Nystatin powder Pseudomonas in Sputum: on Meropenem Completed ( 7/7-7/11) .     - PRIOR ABX: Ertapenem (6/13-6/16, 6/18-6/23), meropenem (5/21-6/5, 6/9-6/13, 7/2-7/5), vanco (6/9, 6/18-6/22, 7/2-7/5), caspofungin (5/23-5/30, 6/9-6/12, 6/18-6/21), Ceftriaxone( 6/5- 6/15), Ampicillin (5/21- 5/27). Zosyn: ( 7/- 7/7)   - Prior Cultures: Kleb bacteremia from 5/15 & 5/17. bronch cx kleb, enterobacter (zosyn, erta resistant), E faecalis, strep angionosus from 5/19, pancreatic abscess cx pan-sensitive kleb 5/22. Now off all antibiotics   PPX: SCDs, SQH held   LINES: PIVs   WOUNDS/DRAINS: J tube Calamine lotion for skin   Dispo: Poss SDU today    Interval Events:  transferred to SICU for HD monitoring. Lactate 0.9; Labetolol 10 given x 1 for 170/110. MN Hgb 7.5 (7.9), dc'd ASA; OLY - soft, nt, nd. /100. Hydralazine 5 mg given IVP.    Patient seen and examined at bedside.      Allergies    No Known Allergies    Intolerances        Vital Signs Last 24 Hrs  T(C): 36.8 (19 Jul 2023 05:11), Max: 37.1 (19 Jul 2023 00:33)  T(F): 98.2 (19 Jul 2023 05:11), Max: 98.7 (19 Jul 2023 00:33)  HR: 96 (19 Jul 2023 06:25) (84 - 108)  BP: 173/104 (19 Jul 2023 06:25) (131/80 - 177/108)  BP(mean): 133 (19 Jul 2023 06:25) (100 - 135)  RR: 30 (19 Jul 2023 06:25) (14 - 30)  SpO2: 100% (19 Jul 2023 06:25) (94% - 100%)    Parameters below as of 19 Jul 2023 06:05  Patient On (Oxygen Delivery Method): BiPAP/CPAP    O2 Concentration (%): 40    07-18 @ 07:01  -  07-19 @ 07:00  --------------------------------------------------------  IN: 240 mL / OUT: 1250 mL / NET: -1010 mL      07-18 @ 07:01  -  07-19 @ 07:00  --------------------------------------------------------  IN: 240 mL / OUT: 1250 mL / NET: -1010 mL        Physical Exam:     General: Resting comfortably in bed, NAD  Neuro: A&Ox3, normal speech  Pulmonary: CTA b/l no w/r/r Scattered rales and decreased BS in bases  Cardiovascular: NSR Reg s1 s2 no M  Abdomen: Soft, nondistended, nontender. J tube in place. Midline incision well healed  Extremeties: WWP, No significant edema appreciated  EVAN: No joint swelling or erythema appreciated  Skin: Dry, no rashes  Psychiatric: Normal affect stating he feels anxious since being brought back to SICU.       LABS:      CBC Full  -  ( 19 Jul 2023 05:30 )  WBC Count : 8.46 K/uL  RBC Count : 2.75 M/uL  Hemoglobin : 8.1 g/dL  Hematocrit : 25.9 %  Platelet Count - Automated : 452 K/uL  Mean Cell Volume : 94.2 fl  Mean Cell Hemoglobin : 29.5 pg  Mean Cell Hemoglobin Concentration : 31.3 gm/dL  Auto Neutrophil # : x  Auto Lymphocyte # : x  Auto Monocyte # : x  Auto Eosinophil # : x  Auto Basophil # : x  Auto Neutrophil % : x  Auto Lymphocyte % : x  Auto Monocyte % : x  Auto Eosinophil % : x  Auto Basophil % : x    07-19    138  |  105  |  12  ----------------------------<  75  3.7   |  21<L>  |  0.59    Ca    8.2<L>      19 Jul 2023 05:30  Phos  3.7     07-19  Mg     1.8     07-19      PT/INR - ( 19 Jul 2023 05:30 )   PT: 16.5 sec;   INR: 1.38          PTT - ( 19 Jul 2023 05:30 )  PTT:34.9 sec      Urinalysis Basic - ( 19 Jul 2023 05:30 )    Color: x / Appearance: x / SG: x / pH: x  Gluc: 75 mg/dL / Ketone: x  / Bili: x / Urobili: x   Blood: x / Protein: x / Nitrite: x   Leuk Esterase: x / RBC: x / WBC x   Sq Epi: x / Non Sq Epi: x / Bacteria: x              RADIOLOGY & ADDITIONAL STUDIES (The following images were personally reviewed):          A/p: A/p: 53M w PMHx of HTN, type A aortic dissection s/p Dacron grafts, AV resuspension in 2013, CAD s/p CABG x 1 SVG to RCA (5/2013 with Dr. Medina), seizure disorder, recent admission 3/20-4/14 for replacement of transverse aortic arch, second stage TEVAR and aorto-axillary bypass, AV replacement (bio 23mm), and CABG x 1 (SVG-RCA). Pt found to have endo leak and taken to OR for TEVAR revision on 5/5. Post op course c/b Klebsiella bacteremia (5/15), resp failure requiring intubation on 5/18, Mucus plugging s/p Bronch 5/19 and PEA arrest. Post operatively pt also found to have hemorrhagic pancreatitis with venu-pancreatic abscess possibly 2* to Depakote therefore s/p IR aspiration of peripancreatic fluid collection and paracentesis on 5/22, IR venu-pancreatic abscess drainage and placement of drainage catheter on 5/24. Pt then transferred from CTICU to SICU for septic shock, and further mgmt of hemorrhagic pancreatitis on 5/25. s/p IR placement of 2 new drainage catheters and catheter exchange on 5/26. LUQ drainage 5/30. OR 5/31 for exlap washout & replacement of 3 new JPs and abthera vac with open abdomen. RTOR 6/1, no bleeding, evac of old blood, placement of feeding J-tube, failed extubation due to PNA, completed ABX course and s/p trach 6/5 and decannulation 6/28. stepped down, but returned to SICU (7/2) for respiratory distress. Emergently intubated (7/3) for aspiration pneumonia, sputum cx grew pseudomonas. Extubated on 7/9. Recent CT with B/L chornic pulmonary embolisms and pneumatosis of cecum. Started on heparin gtt - stopped 2/2 to blood per rectum. Stepped up to SICU for HD monitoring.  NEURO: PRN Dilaudid  for pain. Hx seizures: cont vimpat. Off depakote due to concern for drug-related pancreatitis risk.   HEENT: Artificial tears, Torticollis improved  CV: s/p PEA arrest on 5/24 night. TTE (6/14) LVEF 75%, LVH, biatrial enlargement and elevated PA pressure (elevated from previous), mild to mod MR/TR . Holding ASA 81mg while concerned for bleeding. Continue metoprolol 5mg IVP [id number] while NPO  PULM: Aspiration PNA: S/p Reintubation on 7/3 and extubation on 7/9 - Cont Duonebs. Hx of recent ARDS/PNA resolved and trach decannulated 6/28. Recent CT with b/l chornic pulmonary embolisms - started on heparin gtt - d/c'ed 2/2 to blood in stool. Saturating well on RA.  GI/FEN: Currently NPO pending IVC filter placement. Cleared by S&S for minced and moist with thin liquids. GERD: Cont PPI  No signs of aspiration. Holding TF Vital 1.0 Imdoium PRN for diarrhea. Protonix daily. Initial presentation with hemorrhagic pancreatitis: s/p multiple drain placements and paracentisis by IR team.   : acute renal failure- been off CVVHD since mid June with renal recovery. Voids.  HEME: Stepped up to ICU for blood per rectum. q6 CBC - most recent hb 7.9 (8)  ENDO: mISS  ID: Fungal infection: Cont Nystatin powder Pseudomonas in Sputum: on Meropenem Completed ( 7/7-7/11) .     - PRIOR ABX: Ertapenem (6/13-6/16, 6/18-6/23), meropenem (5/21-6/5, 6/9-6/13, 7/2-7/5), vanco (6/9, 6/18-6/22, 7/2-7/5), caspofungin (5/23-5/30, 6/9-6/12, 6/18-6/21), Ceftriaxone( 6/5- 6/15), Ampicillin (5/21- 5/27). Zosyn: ( 7/- 7/7)   - Prior Cultures: Kleb bacteremia from 5/15 & 5/17. bronch cx kleb, enterobacter (zosyn, erta resistant), E faecalis, strep angionosus from 5/19, pancreatic abscess cx pan-sensitive kleb 5/22. Now off all antibiotics   PPX: SCDs, SQH held   LINES: PIVs   WOUNDS/DRAINS: J tube Calamine lotion for skin   Dispo: Poss SDU today    Interval Events:  transferred to SICU for HD monitoring. Lactate 0.9; Labetolol 10 given x 1 for 170/110. MN Hgb 7.5 (7.9), dc'd ASA; OLY - soft, nt, nd. /100. Hydralazine 5 mg given IVP.    Patient seen and examined at bedside.      Allergies    No Known Allergies    Intolerances        Vital Signs Last 24 Hrs  T(C): 36.8 (19 Jul 2023 05:11), Max: 37.1 (19 Jul 2023 00:33)  T(F): 98.2 (19 Jul 2023 05:11), Max: 98.7 (19 Jul 2023 00:33)  HR: 96 (19 Jul 2023 06:25) (84 - 108)  BP: 173/104 (19 Jul 2023 06:25) (131/80 - 177/108)  BP(mean): 133 (19 Jul 2023 06:25) (100 - 135)  RR: 30 (19 Jul 2023 06:25) (14 - 30)  SpO2: 100% (19 Jul 2023 06:25) (94% - 100%)    Parameters below as of 19 Jul 2023 06:05  Patient On (Oxygen Delivery Method): BiPAP/CPAP    O2 Concentration (%): 40    07-18 @ 07:01  -  07-19 @ 07:00  --------------------------------------------------------  IN: 240 mL / OUT: 1250 mL / NET: -1010 mL      07-18 @ 07:01  -  07-19 @ 07:00  --------------------------------------------------------  IN: 240 mL / OUT: 1250 mL / NET: -1010 mL        Physical Exam:     General: Resting comfortably in bed, NAD  Neuro: A&Ox3, normal speech  Pulmonary: CTA b/l no w/r/r Scattered rales and decreased BS in bases  Cardiovascular: NSR Reg s1 s2 no M  Abdomen: Soft, nondistended, nontender. J tube in place. Midline incision well healed  Extremeties: WWP, No significant edema appreciated  EVAN: No joint swelling or erythema appreciated  Skin: Dry, no rashes  Psychiatric: Normal affect stating he feels anxious since being brought back to SICU.       LABS:      CBC Full  -  ( 19 Jul 2023 05:30 )  WBC Count : 8.46 K/uL  RBC Count : 2.75 M/uL  Hemoglobin : 8.1 g/dL  Hematocrit : 25.9 %  Platelet Count - Automated : 452 K/uL  Mean Cell Volume : 94.2 fl  Mean Cell Hemoglobin : 29.5 pg  Mean Cell Hemoglobin Concentration : 31.3 gm/dL  Auto Neutrophil # : x  Auto Lymphocyte # : x  Auto Monocyte # : x  Auto Eosinophil # : x  Auto Basophil # : x  Auto Neutrophil % : x  Auto Lymphocyte % : x  Auto Monocyte % : x  Auto Eosinophil % : x  Auto Basophil % : x    07-19    138  |  105  |  12  ----------------------------<  75  3.7   |  21<L>  |  0.59    Ca    8.2<L>      19 Jul 2023 05:30  Phos  3.7     07-19  Mg     1.8     07-19      PT/INR - ( 19 Jul 2023 05:30 )   PT: 16.5 sec;   INR: 1.38          PTT - ( 19 Jul 2023 05:30 )  PTT:34.9 sec      Urinalysis Basic - ( 19 Jul 2023 05:30 )    Color: x / Appearance: x / SG: x / pH: x  Gluc: 75 mg/dL / Ketone: x  / Bili: x / Urobili: x   Blood: x / Protein: x / Nitrite: x   Leuk Esterase: x / RBC: x / WBC x   Sq Epi: x / Non Sq Epi: x / Bacteria: x              RADIOLOGY & ADDITIONAL STUDIES (The following images were personally reviewed):          A/p: A/p: 53M w PMHx of HTN, type A aortic dissection s/p Dacron grafts, AV resuspension in 2013, CAD s/p CABG x 1 SVG to RCA (5/2013 with Dr. Medina), seizure disorder, recent admission 3/20-4/14 for replacement of transverse aortic arch, second stage TEVAR and aorto-axillary bypass, AV replacement (bio 23mm), and CABG x 1 (SVG-RCA). Pt found to have endo leak and taken to OR for TEVAR revision on 5/5. Post op course c/b Klebsiella bacteremia (5/15), resp failure requiring intubation on 5/18, Mucus plugging s/p Bronch 5/19 and PEA arrest. Post operatively pt also found to have hemorrhagic pancreatitis with venu-pancreatic abscess possibly 2* to Depakote therefore s/p IR aspiration of peripancreatic fluid collection and paracentesis on 5/22, IR venu-pancreatic abscess drainage and placement of drainage catheter on 5/24. Pt then transferred from CTICU to SICU for septic shock, and further mgmt of hemorrhagic pancreatitis on 5/25. s/p IR placement of 2 new drainage catheters and catheter exchange on 5/26. LUQ drainage 5/30. OR 5/31 for exlap washout & replacement of 3 new JPs and abthera vac with open abdomen. RTOR 6/1, no bleeding, evac of old blood, placement of feeding J-tube, failed extubation due to PNA, completed ABX course and s/p trach 6/5 and decannulation 6/28. stepped down, but returned to SICU (7/2) for respiratory distress. Emergently intubated (7/3) for aspiration pneumonia, sputum cx grew pseudomonas. Extubated on 7/9. Recent CT with B/L chornic pulmonary embolisms and pneumatosis of cecum. Started on heparin gtt - stopped 2/2 to blood per rectum. Stepped up to SICU for HD monitoring.  NEURO: PRN Dilaudid  for pain. Hx seizures: cont vimpat. Off depakote due to concern for drug-related pancreatitis risk.   HEENT: Artificial tears, Torticollis improved  CV: s/p PEA arrest on 5/24 night. TTE (6/14) LVEF 75%, LVH, biatrial enlargement and elevated PA pressure (elevated from previous), mild to mod MR/TR . Holding ASA 81mg while concerned for bleeding. Continue metoprolol 5mg IVP [id number] while NPO  PULM: Aspiration PNA: S/p Reintubation on 7/3 and extubation on 7/9 - Cont Duonebs. Hx of recent ARDS/PNA resolved and trach decannulated 6/28. Recent CT with b/l chornic pulmonary embolisms - started on heparin gtt - d/c'ed 2/2 to blood in stool. Saturating well on RA.  GI/FEN: Currently NPO pending IVC filter placement. Cleared by S&S for minced and moist with thin liquids however held 2* pneumatosis found on CT scan . GERD: Cont PPI  No signs of aspiration. Holding TF Vital 1.0 Imodium PRN for diarrhea. Protonix daily. Initial presentation with hemorrhagic pancreatitis: s/p multiple drain placements and paracentisis by IR team.   : acute renal failure-Now resolved off CVVHD since mid June with renal recovery. Voids.  HEME: Stepped up to ICU for blood per rectum. q6 CBC now stable.   ENDO: mISS  ID: Fungal infection: Cont Nystatin powder Pseudomonas in Sputum: on Meropenem Completed ( 7/7-7/11) .     - PRIOR ABX: Ertapenem (6/13-6/16, 6/18-6/23), meropenem (5/21-6/5, 6/9-6/13, 7/2-7/5), vanco (6/9, 6/18-6/22, 7/2-7/5), caspofungin (5/23-5/30, 6/9-6/12, 6/18-6/21), Ceftriaxone( 6/5- 6/15), Ampicillin (5/21- 5/27). Zosyn: ( 7/- 7/7)   - Prior Cultures: Kleb bacteremia from 5/15 & 5/17. bronch cx kleb, enterobacter (zosyn, erta resistant), E faecalis, strep angionosus from 5/19, pancreatic abscess cx pan-sensitive kleb 5/22. Now off all antibiotics   PPX: SCDs, SQH held   LINES: PIVs   WOUNDS/DRAINS: J tube Calamine lotion for skin rash to Neck  Dispo: Poss SDU today

## 2023-07-19 NOTE — BRIEF OPERATIVE NOTE - OPERATION/FINDINGS
Patient brought to the OR, prepped and draped. Sedation given. Right groin evaluated with ultrasound and shown to have a fluid collection overlying right femoral vein. Access via left groin femoral vein under ultrasound guidance. Cook celect filter placed infrarenal IVC w/o significant angulation. Maximum 10fr sheath. Pressure held 10 minutes. Returned to ICU in stable position. 20 mL contrast.

## 2023-07-19 NOTE — PRE-ANESTHESIA EVALUATION ADULT - NSANTHPMHFT_GEN_ALL_CORE
52M w/ PMHx of HTN, type A aortic dissection s/p Dacron grafts, AV resuspension in 2013, CAD s/p CABG x 1 SVG to RCA (5/2013 with Dr. Medina), seizure disorder (last episode on 7/4/22) admitted for progression of aneurysmal disease with course complicated by necrotizing pancreatitis. Vascular surgery is consulted for bloody stools following initiation of heparin for PE.     Patient has been admitted with a complicated course. Recent CT A/P w/ contrast yesterday incidentally showed bilateral PEs which in retrospect were present on imaging one month ago. Pneumatosis coli was also noted at the cecum. The patient was started on heparin overnight. Initially his PTT was elevated at 118 and later came down within target range. This morning, he had a loose stool with some streaked blood. The patient denies any asymmetric leg swelling, pain or chest pain. He is having ongoing shortness of breath.     PAST MEDICAL & SURGICAL HISTORY:  HTN (hypertension)  Aortic dissection  CAD (coronary artery disease)  Seizure disorder  Hypertensionb    PSH:  Status post endovascular aneurysm repair (EVAR)  S/P aortic bifurcation bypass graft  S/P CABG x 1 53 YO Male, current every day marijuana use, w/ PMHx of HTN, type A aortic dissection s/p Dacron grafts, AV resuspension in 2013, CAD s/p CABG x 1 SVG to RCA (5/2013 with Dr. Medina), seizure disorder (last episode on 7/4/22) who was admitted for surgical mgmt of progression of aneurysmal disease. Recent admission 3/20-4/14 during which patient underwent replacement of transverse aortic arch, second stage thoracic endovascular aortic repair, aorto-axillary bypass, AV replacement (bio 23mm), and CABG x 1 (SVG-RCA) EF 75% (Ignacio, 3/20). Post op cb lactic acidosis, severe cardiogenic and vasogenic shock, TREY requiring CVVHD and temporary dialysis requirement. Patient presented to [facility] ED 4/27 for syncope vs seizure episode at home, falling onto back of head. Nuerological workup proformed during that visit revealed a negative EEG, but given clinical picture of seizures, pt started on vimpat and depakote. Imaging preformed during that admission did no require acute surgical intervention on endoleak.     Prolonged SICU stay in the past 2/2 hemorrhagic pancreatitis, recurrent aspiration and hypoxic respiratory failure s/p tracheostomy s/p decannulation. Most recently in SICU for aspiration PNA requiring intubation. S/P Meropenem and then extubated and stepped down to floor. Most recent CT scan revealing bilateral chronic PEs and pneumatosis of cecum.     Patient on heparin gtt and with blood per rectum. Heparin gtt stopped and patient stepped up to SICU for HD monitoring      PAST MEDICAL & SURGICAL HISTORY:  HTN (hypertension)  Aortic dissection s/p EVAR x 2 and Aortic Valve resuspension  CAD (coronary artery disease) s/p CABG x 2  Seizure disorder  Hypertensionb  Daily smoker  SAD  Trach s/p decannulation  BIlateral PEs  hx necrotizing/hemorrhagic apncreatitis      PSH:  Status post endovascular aneurysm repair (EVAR)  S/P aortic bifurcation bypass graft  S/P CABG x 1

## 2023-07-19 NOTE — PRE-ANESTHESIA EVALUATION ADULT - MALLAMPATI CLASS
Class II - visualization of the soft palate, fauces, and uvula
Class II - visualization of the soft palate, fauces, and uvula
Class IV (difficult) - the soft palate is not visible at all
Class III - visualization of the soft palate and the base of the uvula
by report/Class II - visualization of the soft palate, fauces, and uvula

## 2023-07-19 NOTE — PRE-ANESTHESIA EVALUATION ADULT - NSANTHPROCED_GEN_ALL_CORE
Arterial Catheter/Central Venous Catheter
Arterial Catheter
Arterial Catheter/Transesophageal Echocardiogram
arterial line, central line in situ
Arterial Catheter/Central Venous Catheter
Arterial Catheter

## 2023-07-19 NOTE — PRE-ANESTHESIA EVALUATION ADULT - NSPREOPDXFT_GEN_ALL_CORE
PE
ascites, pancreatic fluid collection
abdominal hematoma
Asc aortic aneurysm
Pancreatitis s/p ex-lap
Acute abdomen, sepsis

## 2023-07-19 NOTE — CHART NOTE - NSCHARTNOTEFT_GEN_A_CORE
Admitting Diagnosis:   Patient is a 53y old  Male who presents with a chief complaint of endoleak, abdominal pain (19 Jul 2023 12:30)      PAST MEDICAL & SURGICAL HISTORY:  HTN (hypertension)      Aortic dissection      CAD (coronary artery disease)      Seizure disorder      Seizure disorder      Hypertension      Status post endovascular aneurysm repair (EVAR)      S/P aortic bifurcation bypass graft      S/P CABG x 1          Current Nutrition Order: NPO    PO Intake: Good (%) [   ]  Fair (50-75%) [   ] Poor (<25%) [   ]- N/A    GI Issues: 1 episode loose red streaked BM noted yesterday    Pain: absence of pain/discomfort    Skin Integrity: sx incision to abdomen, R buttocks pressure injury, stage 2 PU to sacrum, stage II PU to L scapula. J tube in place. Sandro score 14    Labs:   07-19    138  |  105  |  12  ----------------------------<  75  3.7   |  21<L>  |  0.59    Ca    8.2<L>      19 Jul 2023 05:30  Phos  3.7     07-19  Mg     1.8     07-19      CAPILLARY BLOOD GLUCOSE          Medications:  MEDICATIONS  (STANDING):    MEDICATIONS  (PRN):      Weight: 90.9kg [7 June 2023]  Daily Height in cm: 182.9 (19 Jul 2023 10:43)    Daily 83.3kg [4 July 2023]    Weight Change: wt of 83.3kg July 4 likely error vs. fluid shifts? Recommend weekly weights for trending & ensure adequate nutrition    Estimated energy needs:   Ideal body weight (80.9kg) used for calculations as pt >100% of IBW, BMI <30 per [facility] Standards of Care. Needs estimated for age and adjusted for current clinical status     Calories: 2022.5-2427 kcals based on 25-30 kcals/kg  Protein: 113.3-129.4g protein based on 1.4-1.6g protein/kg  *Fluid Needs per team    Subjective:   53M w PMHx of HTN, type A aortic dissection s/p Dacron grafts, AV resuspension in 2013, CAD s/p CABG x 1 SVG to RCA (5/2013 with Dr. Medina), seizure disorder, recent admission 3/20-4/14 for replacement of transverse aortic arch, second stage TEVAR and aorto-axillary bypass, AV replacement (bio 23mm), and CABG x 1 (SVG-RCA). Pt found to have endo leak and taken to OR for TEVAR revision on 5/5. Post op course c/b Klebsiella bacteremia (5/15), resp failure requiring intubation on 5/18, Mucus plugging s/p Bronch 5/19 and PEA arrest. Post operatively pt also found to have hemorrhagic pancreatitis with venu-pancreatic abscess possibly 2* to Depakote therefore s/p IR aspiration of peripancreatic fluid collection and paracentesis on 5/22, IR venu-pancreatic abscess drainage and placement of drainage catheter on 5/24. Pt then transferred from CTICU to SICU for septic shock, and further mgmt of hemorrhagic pancreatitis on 5/25. s/p IR placement of 2 new drainage catheters and catheter exchange on 5/26. LUQ drainage 5/30. OR 5/31 for exlap washout & replacement of 3 new JPs and abthera vac with open abdomen. RTOR 6/1, no bleeding, evac of old blood, placement of feeding J-tube, failed extubation due to PNA, completed ABX course and s/p trach 6/5 and decannulation 6/28. stepped down, but returned to SICU (7/2) for respiratory distress. Emergently intubated (7/3) for aspiration pneumonia, sputum cx grew pseudomonas. Extubated on 7/9. FEES 7/12: minced moist with thin. 7/14: SDU from SICU     Chart reviewed. Pt seen today on 5EA with IDT during AM rounds. Afebrile. HR WNL, BP trending high. MAPs >65 mmHg. Plan for OR today for IVC filter. Seen by SLP & cleared for minced and moist diet with thin liquids. NPO & holding TF 7/17 for pneumatosis cecum/concern for cecal ischemia. Transferred to SICU with melena, 1 episode red stool noted yesterday, no further bleeding noted. Recommend to resume nutrition as soon as medically feasible, consider temporary PN while GI tract not viable. RDN will continue to monitor, reassess, and intervene as appropriate.     Previous Nutrition Diagnosis:   Increased nutrients RT increased demands AEB clinical course, Pressure ulcers    Active [ X  ]  Resolved [   ]    If resolved, new PES:     Goal:  Initiate nutrition within 24hrs  Pt will meet at least 75% of protein & energy needs via most appropriate route for nutrition     Recommendations:  1. Resume nutrition as soon as medically feasible  >>If PO- recommend minced & moist diet [low fiber]  - close monitoring of intake & tolerance  - monitor s/s GI distress  - consider oral nutrition supplement if PO intake suboptimal to meet needs  >>If PO diet/EN not feasible within 24-48hrs, consider initiating temporary PN  2. Vitamins/minerals per team  - consider thiamine while NPO  - c/w MVI  3. Monitor GI function, skin integrity, chemistry  4. Align nutrition with GOC at all times  5. Monitor clinical course & adjust recommendations prn    Education: deferred    Risk Level: High [  X ] Moderate [   ] Low [   ]

## 2023-07-19 NOTE — PROGRESS NOTE ADULT - SUBJECTIVE AND OBJECTIVE BOX
SUBJECTIVE: Patient seen at bedside. Remained hypertensive overnight, received labetalol and hydralazine. No episodes of melena overnight.       ICU Vital Signs Last 24 Hrs  T(C): 36.6 (19 Jul 2023 13:04), Max: 37.1 (19 Jul 2023 00:33)  T(F): 97.9 (19 Jul 2023 13:04), Max: 98.7 (19 Jul 2023 00:33)  HR: 89 (19 Jul 2023 13:04) (82 - 108)  BP: 170/94 (19 Jul 2023 10:43) (131/80 - 177/108)  BP(mean): 125 (19 Jul 2023 10:30) (100 - 135)  ABP: --  ABP(mean): --  RR: 19 (19 Jul 2023 13:04) (13 - 30)  SpO2: 95% (19 Jul 2023 13:04) (94% - 100%)    O2 Parameters below as of 19 Jul 2023 10:43  Patient On (Oxygen Delivery Method): room air          General: Resting comfortably in bed, NAD  Neuro: A&Ox3, normal speech  HEENT: ATNC  Pulmonary: CTA b/l no w/r/r + Rales and decreased BS in bases  Cardiovascular: NSR Reg s1 s2 no M  Abdomen: Soft, nondistended, nontender. J tube in place. Midline incision well healaed  Extremeties: WWP, No significant edema appreciated  EVAN: No joint swelling or erythema appreciated  Skin: Dry, no rashes  Psychiatric: Normal affect      I&O's Summary    18 Jul 2023 07:01  -  19 Jul 2023 07:00  --------------------------------------------------------  IN: 310 mL / OUT: 1250 mL / NET: -940 mL    19 Jul 2023 07:01  -  19 Jul 2023 13:35  --------------------------------------------------------  IN: 100 mL / OUT: 0 mL / NET: 100 mL        LABS:                        8.1    8.46  )-----------( 452      ( 19 Jul 2023 05:30 )             25.9     07-19    138  |  105  |  12  ----------------------------<  75  3.7   |  21<L>  |  0.59    Ca    8.2<L>      19 Jul 2023 05:30  Phos  3.7     07-19  Mg     1.8     07-19      PT/INR - ( 19 Jul 2023 05:30 )   PT: 16.5 sec;   INR: 1.38          PTT - ( 19 Jul 2023 05:30 )  PTT:34.9 sec  Urinalysis Basic - ( 19 Jul 2023 05:30 )    Color: x / Appearance: x / SG: x / pH: x  Gluc: 75 mg/dL / Ketone: x  / Bili: x / Urobili: x   Blood: x / Protein: x / Nitrite: x   Leuk Esterase: x / RBC: x / WBC x   Sq Epi: x / Non Sq Epi: x / Bacteria: x      CAPILLARY BLOOD GLUCOSE            Cultures:    RADIOLOGY & ADDITIONAL STUDIES:    CT chest, abdomen and pelvis 7/17/23: 1. Since 7/2/2023, there are filling defects in right-sided pulmonary arteries, consistent with pulmonary emboli. In retrospect, these pulmonary emboli have been present on prior studies dating back to 6/16/2023. No new pulmonary emboli are identified. 2. Multiple new wedge-shaped areas of diminished enhancement in the kidneys bilaterally. These are suspicious for infarcts. The differential diagnosis includes pyelonephritis. 3. Pneumatosis coli in the cecum. This raises the suspicion of ischemia. 4. New left upper lobe infiltrate in addition to right-sided infiltrates. The differential diagnosis includes ARDS, edema, infection, and hemorrhage. 5. The right ventricle is larger than the left ventricle. While this may be a sign of right heart strain, this finding has been present since the patient's first CT scan of 11/30/2022. The right heart enlargement may therefore be a sign of pulmonary hypertension. 6. No significant change large bilateral pleural effusions. 7. Improvement in the appearance of the pancreas. 8. No significant change in the appearance of the postoperative appearance of the thoracoabdominal aortic dissection and aneurysm. 9. Decrease in size of splenic infarct. 10. Anasarca.

## 2023-07-19 NOTE — PROGRESS NOTE ADULT - ASSESSMENT
A/p: 53M w PMHx of HTN, type A aortic dissection s/p Dacron grafts, AV resuspension in 2013, CAD s/p CABG x 1 SVG to RCA (5/2013 with Dr. Medina), seizure disorder, recent admission 3/20-4/14 for replacement of transverse aortic arch, second stage TEVAR and aorto-axillary bypass, AV replacement (bio 23mm), and CABG x 1 (SVG-RCA). Pt found to have endo leak and taken to OR for TEVAR revision on 5/5. Post op course c/b Klebsiella bacteremia (5/15), resp failure requiring intubation on 5/18, Mucus plugging s/p Bronch 5/19 and PEA arrest. Post operatively pt also found to have hemorrhagic pancreatitis with venu-pancreatic abscess possibly 2* to Depakote therefore s/p IR aspiration of peripancreatic fluid collection and paracentesis on 5/22, IR venu-pancreatic abscess drainage and placement of drainage catheter on 5/24. Pt then transferred from CTICU to SICU for septic shock, and further mgmt of hemorrhagic pancreatitis on 5/25. s/p IR placement of 2 new drainage catheters and catheter exchange on 5/26. LUQ drainage 5/30. OR 5/31 for exlap washout & replacement of 3 new JPs and abthera vac with open abdomen. RTOR 6/1, no bleeding, evac of old blood, placement of feeding J-tube, failed extubation due to PNA, completed ABX course and s/p trach 6/5 and decannulation 6/28. stepped down, but returned to SICU (7/2) for respiratory distress. Emergently intubated (7/3) for aspiration pneumonia, sputum cx grew pseudomonas. Extubated on 7/9. CT scan on 7/17 shows chronic PE and concern for cecal ischemia. Patient stepped down to SICU following episodes of melena. No further bleeding. Anticoagulation held.     Plan   - IVC filter placement by IR   - Can step down in afternoon   - Rest of care as per SICU team   - Team 1 will follow

## 2023-07-19 NOTE — PROGRESS NOTE ADULT - SUBJECTIVE AND OBJECTIVE BOX
POST OPERATIVE CHECK    S: Patient is sleeping. Discussed case w/ family at bedside. Upon waking, patient states he feels well.     O:     T(C): 36.6 (07-19-23 @ 13:04), Max: 37.1 (07-19-23 @ 00:33)  HR: 94 (07-19-23 @ 14:27) (82 - 108)  BP: 168/98 (07-19-23 @ 14:27) (1/- - 177/108)  RR: 18 (07-19-23 @ 14:27) (13 - 30)  SpO2: 98% (07-19-23 @ 14:27) (94% - 100%)    Physical Exam:     General: Seen in ICU. Sleeping. Arousable.   CV: HDS, WWP  Pulm: On room air  Abd: S/nt/nd  Extremity: L groin access soft.     Labs:                       8.1    8.46  )-----------( 452      ( 19 Jul 2023 05:30 )             25.9   07-19    138  |  105  |  12  ----------------------------<  75  3.7   |  21<L>  |  0.59    Ca    8.2<L>      19 Jul 2023 05:30  Phos  3.7     07-19  Mg     1.8     07-19    A/P: 53M w/ PMHx type A dissection admitted initially for endoleak management now admitted for sequelae of necrotizing pancreatitis, found to have PE. Per primary team, impression is that the patient cannot be safely anticoagulated given bloody bowel movements, possibly secondary to colitis. Now s/p IVC filter placement, in expected post-operative condition.     - Vascular is available to retrieve the filter as soon as the patient is able to be anticoagulated, including this admission.   - If the patient is discharged prior to filter retrieval he should follow up with Dr. Ellison. Please page on discharge for follow up appointment.   - Vascular to sign off.     Discussed w/ chief.

## 2023-07-19 NOTE — PRE-ANESTHESIA EVALUATION ADULT - NSANTHOSAYNRD_GEN_A_CORE
No. PAULINA screening performed.  STOP BANG Legend: 0-2 = LOW Risk; 3-4 = INTERMEDIATE Risk; 5-8 = HIGH Risk

## 2023-07-19 NOTE — BRIEF OPERATIVE NOTE - NSICDXBRIEFPREOP_GEN_ALL_CORE_FT
PRE-OP DIAGNOSIS:  Chronic thoracic aortic dissection 05-May-2023 14:56:35  Rai Tran  
PRE-OP DIAGNOSIS:  Necrotizing pancreatitis 31-May-2023 11:36:24  Dorina Plummer  
PRE-OP DIAGNOSIS:  Pulmonary embolism 19-Jul-2023 16:08:57  Edu Chase  
PRE-OP DIAGNOSIS:  Necrotizing pancreatitis 31-May-2023 11:36:24  Dorina Plummer

## 2023-07-19 NOTE — BRIEF OPERATIVE NOTE - NSICDXBRIEFPOSTOP_GEN_ALL_CORE_FT
POST-OP DIAGNOSIS:  Chronic thoracic aortic dissection 05-May-2023 14:56:47  Rai Tran  
POST-OP DIAGNOSIS:  Necrotizing pancreatitis 31-May-2023 11:36:40  Dorina Plummer  
POST-OP DIAGNOSIS:  Pulmonary embolism 19-Jul-2023 16:09:06  Edu Chase  
POST-OP DIAGNOSIS:  Necrotizing pancreatitis 31-May-2023 11:36:40  Dorina Plummer

## 2023-07-19 NOTE — PRE-ANESTHESIA EVALUATION ADULT - NSPROPOSEDPROCEDFT_GEN_ALL_CORE
IVC Filter Placement
venu-pancreatic fluid drainage, paracentesis
Exploratory laparotomy
embolization of abdominal bleed
TEVAR
[place]

## 2023-07-20 LAB
ANION GAP SERPL CALC-SCNC: 13 MMOL/L — SIGNIFICANT CHANGE UP (ref 5–17)
APTT BLD: 34.3 SEC — SIGNIFICANT CHANGE UP (ref 27.5–35.5)
BUN SERPL-MCNC: 12 MG/DL — SIGNIFICANT CHANGE UP (ref 7–23)
CALCIUM SERPL-MCNC: 8.6 MG/DL — SIGNIFICANT CHANGE UP (ref 8.4–10.5)
CHLORIDE SERPL-SCNC: 104 MMOL/L — SIGNIFICANT CHANGE UP (ref 96–108)
CO2 SERPL-SCNC: 21 MMOL/L — LOW (ref 22–31)
CREAT SERPL-MCNC: 0.58 MG/DL — SIGNIFICANT CHANGE UP (ref 0.5–1.3)
EGFR: 116 ML/MIN/1.73M2 — SIGNIFICANT CHANGE UP
GLUCOSE SERPL-MCNC: 66 MG/DL — LOW (ref 70–99)
HCT VFR BLD CALC: 25.2 % — LOW (ref 39–50)
HGB BLD-MCNC: 8 G/DL — LOW (ref 13–17)
INR BLD: 1.33 — HIGH (ref 0.88–1.16)
MAGNESIUM SERPL-MCNC: 1.8 MG/DL — SIGNIFICANT CHANGE UP (ref 1.6–2.6)
MCHC RBC-ENTMCNC: 29.4 PG — SIGNIFICANT CHANGE UP (ref 27–34)
MCHC RBC-ENTMCNC: 31.7 GM/DL — LOW (ref 32–36)
MCV RBC AUTO: 92.6 FL — SIGNIFICANT CHANGE UP (ref 80–100)
NRBC # BLD: 0 /100 WBCS — SIGNIFICANT CHANGE UP (ref 0–0)
PHOSPHATE SERPL-MCNC: 3.7 MG/DL — SIGNIFICANT CHANGE UP (ref 2.5–4.5)
PLATELET # BLD AUTO: 406 K/UL — HIGH (ref 150–400)
POTASSIUM SERPL-MCNC: 3.8 MMOL/L — SIGNIFICANT CHANGE UP (ref 3.5–5.3)
POTASSIUM SERPL-SCNC: 3.8 MMOL/L — SIGNIFICANT CHANGE UP (ref 3.5–5.3)
PROTHROM AB SERPL-ACNC: 15.9 SEC — HIGH (ref 10.5–13.4)
RBC # BLD: 2.72 M/UL — LOW (ref 4.2–5.8)
RBC # FLD: 18 % — HIGH (ref 10.3–14.5)
SODIUM SERPL-SCNC: 138 MMOL/L — SIGNIFICANT CHANGE UP (ref 135–145)
WBC # BLD: 7.94 K/UL — SIGNIFICANT CHANGE UP (ref 3.8–10.5)
WBC # FLD AUTO: 7.94 K/UL — SIGNIFICANT CHANGE UP (ref 3.8–10.5)

## 2023-07-20 PROCEDURE — 99232 SBSQ HOSP IP/OBS MODERATE 35: CPT | Mod: GC

## 2023-07-20 RX ORDER — LABETALOL HCL 100 MG
10 TABLET ORAL ONCE
Refills: 0 | Status: COMPLETED | OUTPATIENT
Start: 2023-07-20 | End: 2023-07-20

## 2023-07-20 RX ORDER — HEPARIN SODIUM 5000 [USP'U]/ML
5000 INJECTION INTRAVENOUS; SUBCUTANEOUS EVERY 8 HOURS
Refills: 0 | Status: DISCONTINUED | OUTPATIENT
Start: 2023-07-20 | End: 2023-08-03

## 2023-07-20 RX ORDER — OXYCODONE HYDROCHLORIDE 5 MG/1
5 TABLET ORAL EVERY 6 HOURS
Refills: 0 | Status: DISCONTINUED | OUTPATIENT
Start: 2023-07-20 | End: 2023-07-27

## 2023-07-20 RX ORDER — POTASSIUM CHLORIDE 20 MEQ
20 PACKET (EA) ORAL ONCE
Refills: 0 | Status: COMPLETED | OUTPATIENT
Start: 2023-07-20 | End: 2023-07-20

## 2023-07-20 RX ORDER — AMLODIPINE BESYLATE 2.5 MG/1
10 TABLET ORAL DAILY
Refills: 0 | Status: DISCONTINUED | OUTPATIENT
Start: 2023-07-20 | End: 2023-08-03

## 2023-07-20 RX ORDER — METOPROLOL TARTRATE 50 MG
100 TABLET ORAL DAILY
Refills: 0 | Status: DISCONTINUED | OUTPATIENT
Start: 2023-07-20 | End: 2023-08-03

## 2023-07-20 RX ORDER — LABETALOL HCL 100 MG
10 TABLET ORAL EVERY 6 HOURS
Refills: 0 | Status: DISCONTINUED | OUTPATIENT
Start: 2023-07-20 | End: 2023-08-03

## 2023-07-20 RX ORDER — MAGNESIUM SULFATE 500 MG/ML
1 VIAL (ML) INJECTION ONCE
Refills: 0 | Status: COMPLETED | OUTPATIENT
Start: 2023-07-20 | End: 2023-07-20

## 2023-07-20 RX ORDER — OXYCODONE HYDROCHLORIDE 5 MG/1
2.5 TABLET ORAL EVERY 6 HOURS
Refills: 0 | Status: DISCONTINUED | OUTPATIENT
Start: 2023-07-20 | End: 2023-07-26

## 2023-07-20 RX ADMIN — CHLORHEXIDINE GLUCONATE 1 APPLICATION(S): 213 SOLUTION TOPICAL at 06:12

## 2023-07-20 RX ADMIN — OXYCODONE HYDROCHLORIDE 5 MILLIGRAM(S): 5 TABLET ORAL at 17:30

## 2023-07-20 RX ADMIN — Medication 10 MILLIGRAM(S): at 17:40

## 2023-07-20 RX ADMIN — CALAMINE AND ZINC OXIDE AND PHENOL 1 APPLICATION(S): 160; 10 LOTION TOPICAL at 06:11

## 2023-07-20 RX ADMIN — Medication 2 MILLIGRAM(S): at 23:50

## 2023-07-20 RX ADMIN — HEPARIN SODIUM 5000 UNIT(S): 5000 INJECTION INTRAVENOUS; SUBCUTANEOUS at 21:33

## 2023-07-20 RX ADMIN — Medication 5 MILLIGRAM(S): at 06:13

## 2023-07-20 RX ADMIN — Medication 20 MILLIEQUIVALENT(S): at 10:37

## 2023-07-20 RX ADMIN — Medication 10 MILLIGRAM(S): at 19:13

## 2023-07-20 RX ADMIN — AMLODIPINE BESYLATE 10 MILLIGRAM(S): 2.5 TABLET ORAL at 10:37

## 2023-07-20 RX ADMIN — PANTOPRAZOLE SODIUM 40 MILLIGRAM(S): 20 TABLET, DELAYED RELEASE ORAL at 13:14

## 2023-07-20 RX ADMIN — Medication 2 MILLIGRAM(S): at 06:13

## 2023-07-20 RX ADMIN — Medication 81 MILLIGRAM(S): at 13:13

## 2023-07-20 RX ADMIN — HEPARIN SODIUM 5000 UNIT(S): 5000 INJECTION INTRAVENOUS; SUBCUTANEOUS at 13:13

## 2023-07-20 RX ADMIN — OXYCODONE HYDROCHLORIDE 5 MILLIGRAM(S): 5 TABLET ORAL at 23:15

## 2023-07-20 RX ADMIN — Medication 10 MILLIGRAM(S): at 12:04

## 2023-07-20 RX ADMIN — CALAMINE AND ZINC OXIDE AND PHENOL 1 APPLICATION(S): 160; 10 LOTION TOPICAL at 17:41

## 2023-07-20 RX ADMIN — OXYCODONE HYDROCHLORIDE 5 MILLIGRAM(S): 5 TABLET ORAL at 16:31

## 2023-07-20 RX ADMIN — OXYCODONE HYDROCHLORIDE 5 MILLIGRAM(S): 5 TABLET ORAL at 22:47

## 2023-07-20 RX ADMIN — Medication 10 MILLIGRAM(S): at 07:41

## 2023-07-20 RX ADMIN — Medication 15 MILLILITER(S): at 13:13

## 2023-07-20 RX ADMIN — Medication 100 MILLIGRAM(S): at 08:42

## 2023-07-20 RX ADMIN — Medication 100 GRAM(S): at 08:42

## 2023-07-20 RX ADMIN — Medication 2 MILLIGRAM(S): at 13:14

## 2023-07-20 RX ADMIN — LACOSAMIDE 140 MILLIGRAM(S): 50 TABLET ORAL at 18:46

## 2023-07-20 RX ADMIN — LACOSAMIDE 140 MILLIGRAM(S): 50 TABLET ORAL at 07:13

## 2023-07-20 RX ADMIN — Medication 10 MILLIGRAM(S): at 10:37

## 2023-07-20 RX ADMIN — Medication 2 MILLIGRAM(S): at 17:41

## 2023-07-20 NOTE — PROGRESS NOTE ADULT - SUBJECTIVE AND OBJECTIVE BOX
Interval Events:  Given Tylenol for pain overnight. Bipap for sleep.   Patient seen and examined at bedside.      Allergies    No Known Allergies    Intolerances        Vital Signs Last 24 Hrs  T(C): 36.6 (20 Jul 2023 05:16), Max: 37.1 (19 Jul 2023 22:13)  T(F): 97.9 (20 Jul 2023 05:16), Max: 98.7 (19 Jul 2023 22:13)  HR: 87 (20 Jul 2023 08:00) (82 - 112)  BP: 169/103 (20 Jul 2023 08:00) (1/- - 184/94)  BP(mean): 131 (20 Jul 2023 08:00) (117 - 138)  RR: 20 (20 Jul 2023 08:00) (13 - 89)  SpO2: 97% (20 Jul 2023 08:00) (94% - 100%)    Parameters below as of 20 Jul 2023 08:00  Patient On (Oxygen Delivery Method): room air        07-19 @ 07:01  -  07-20 @ 07:00  --------------------------------------------------------  IN: 200 mL / OUT: 400 mL / NET: -200 mL      07-19 @ 07:01  -  07-20 @ 07:00  --------------------------------------------------------  IN: 200 mL / OUT: 400 mL / NET: -200 mL        Physical Exam:     General: Resting comfortably in bed, NAD  Neuro: A&Ox3, normal speech  Pulmonary: CTA b/l no w/r/r Scattered rales and decreased BS in bases  Cardiovascular: NSR Reg s1 s2 no M  Abdomen: Soft, nondistended, nontender. J tube in place. Midline incision well healed  Extremeties: WWP, No significant edema appreciated  EVAN: No joint swelling or erythema appreciated  Skin: Dry, no rashes  Psychiatric: Normal affect stating he feels anxious since being brought back to SICU.       LABS:      CBC Full  -  ( 20 Jul 2023 05:30 )  WBC Count : 7.94 K/uL  RBC Count : 2.72 M/uL  Hemoglobin : 8.0 g/dL  Hematocrit : 25.2 %  Platelet Count - Automated : 406 K/uL  Mean Cell Volume : 92.6 fl  Mean Cell Hemoglobin : 29.4 pg  Mean Cell Hemoglobin Concentration : 31.7 gm/dL  Auto Neutrophil # : x  Auto Lymphocyte # : x  Auto Monocyte # : x  Auto Eosinophil # : x  Auto Basophil # : x  Auto Neutrophil % : x  Auto Lymphocyte % : x  Auto Monocyte % : x  Auto Eosinophil % : x  Auto Basophil % : x    07-20    138  |  104  |  12  ----------------------------<  66<L>  3.8   |  21<L>  |  0.58    Ca    8.6      20 Jul 2023 05:30  Phos  3.7     07-20  Mg     1.8     07-20      PT/INR - ( 20 Jul 2023 06:13 )   PT: 15.9 sec;   INR: 1.33          PTT - ( 20 Jul 2023 06:13 )  PTT:34.3 sec      Urinalysis Basic - ( 20 Jul 2023 05:30 )    Color: x / Appearance: x / SG: x / pH: x  Gluc: 66 mg/dL / Ketone: x  / Bili: x / Urobili: x   Blood: x / Protein: x / Nitrite: x   Leuk Esterase: x / RBC: x / WBC x   Sq Epi: x / Non Sq Epi: x / Bacteria: x              RADIOLOGY & ADDITIONAL STUDIES (The following images were personally reviewed):          A/p:A/p: 53M w PMHx of HTN, type A aortic dissection s/p Dacron grafts, AV resuspension in 2013, CAD s/p CABG x 1 SVG to RCA (5/2013 with Dr. Medina), seizure disorder, recent admission 3/20-4/14 for replacement of transverse aortic arch, second stage TEVAR and aorto-axillary bypass, AV replacement (bio 23mm), and CABG x 1 (SVG-RCA). Pt found to have endo leak and taken to OR for TEVAR revision on 5/5. Post op course c/b Klebsiella bacteremia (5/15), resp failure requiring intubation on 5/18, Mucus plugging s/p Bronch 5/19 and PEA arrest. Post operatively pt also found to have hemorrhagic pancreatitis with venu-pancreatic abscess possibly 2* to Depakote therefore s/p IR aspiration of peripancreatic fluid collection and paracentesis on 5/22, IR venu-pancreatic abscess drainage and placement of drainage catheter on 5/24. Pt then transferred from CTICU to SICU for septic shock, and further mgmt of hemorrhagic pancreatitis on 5/25. s/p IR placement of 2 new drainage catheters and catheter exchange on 5/26. LUQ drainage 5/30. OR 5/31 for exlap washout & replacement of 3 new JPs and abthera vac with open abdomen. RTOR 6/1, no bleeding, evac of old blood, placement of feeding J-tube, failed extubation due to PNA, completed ABX course and s/p trach 6/5 and decannulation 6/28. stepped down, but returned to SICU (7/2) for respiratory distress. Emergently intubated (7/3) for aspiration pneumonia, sputum cx grew pseudomonas. Extubated on 7/9. Recent CT with B/L chornic pulmonary embolisms and pneumatosis of cecum. Started on heparin gtt - stopped 2/2 to blood per rectum. Stepped up to SICU for HD monitoring on 7/18. Transferred back to SDU on 7/19 IVC filter placed on 7/19.     NEURO: PRN Dilaudid  for pain. Hx seizures: cont vimpat. Off depakote due to concern for drug-related pancreatitis risk.   HEENT: Artificial tears, Torticollis improved  CV: s/p PEA arrest on 5/24 night. TTE (6/14) LVEF 75%, LVH, biatrial enlargement and elevated PA pressure (elevated from previous), mild to mod MR/TR . Cont ASA 81mg  Changed IV lopressor to  qd, Labetalol 10 prn   PULM: Aspiration PNA: S/p Reintubation on 7/3 and extubation on 7/9 - Cont Duonebs. Hx of recent ARDS/PNA resolved and trach decannulated 6/28. Recent CT with b/l chornic pulmonary embolisms - started on heparin gtt - d/c'ed 2/2 to blood in stool. Saturating well on RA.  GI/FEN: Cleared by S&S for minced and moist with thin liquids. GERD: Cont PPI  No signs of aspiration. Holding TF Vital 1.0 Imdoium PRN for diarrhea. Protonix daily. Initial presentation with hemorrhagic pancreatitis: s/p multiple drain placements and paracentisis by IR team.   : acute renal failure- been off CVVHD since mid June with renal recovery. Voids.  HEME: Stepped up to ICU for blood per rectum. q6 CBC - most recent hb 7.9 (8)  ENDO: mISS  ID: Fungal infection: Cont Nystatin powder Pseudomonas in Sputum: on Meropenem Completed ( 7/7-7/11) .     - PRIOR ABX: Ertapenem (6/13-6/16, 6/18-6/23), meropenem (5/21-6/5, 6/9-6/13, 7/2-7/5), vanco (6/9, 6/18-6/22, 7/2-7/5), caspofungin (5/23-5/30, 6/9-6/12, 6/18-6/21), Ceftriaxone( 6/5- 6/15), Ampicillin (5/21- 5/27). Zosyn: ( 7/- 7/7)   - Prior Cultures: Kleb bacteremia from 5/15 & 5/17. bronch cx kleb, enterobacter (zosyn, erta resistant), E faecalis, strep angionosus from 5/19, pancreatic abscess cx pan-sensitive kleb 5/22. Now off all antibiotics   PPX: SCDs, SQH   LINES: PIVs   WOUNDS/Skin/ DRAINS: J tube.  Calamine lotion for skin   Dispo: Poss SDU today  Interval Events:  Given Tylenol for pain overnight. Bipap for sleep.   Patient seen and examined at bedside.      Allergies    No Known Allergies    Intolerances        Vital Signs Last 24 Hrs  T(C): 36.6 (20 Jul 2023 05:16), Max: 37.1 (19 Jul 2023 22:13)  T(F): 97.9 (20 Jul 2023 05:16), Max: 98.7 (19 Jul 2023 22:13)  HR: 87 (20 Jul 2023 08:00) (82 - 112)  BP: 169/103 (20 Jul 2023 08:00) (1/- - 184/94)  BP(mean): 131 (20 Jul 2023 08:00) (117 - 138)  RR: 20 (20 Jul 2023 08:00) (13 - 89)  SpO2: 97% (20 Jul 2023 08:00) (94% - 100%)    Parameters below as of 20 Jul 2023 08:00  Patient On (Oxygen Delivery Method): room air        07-19 @ 07:01  -  07-20 @ 07:00  --------------------------------------------------------  IN: 200 mL / OUT: 400 mL / NET: -200 mL      07-19 @ 07:01  -  07-20 @ 07:00  --------------------------------------------------------  IN: 200 mL / OUT: 400 mL / NET: -200 mL        Physical Exam:     General: Resting comfortably in bed, NAD  Neuro: A&Ox3, normal speech  HEENT: Continued Torticolis to left side Improved overall.   Pulmonary: CTA b/l no w/r/r Scattered rales and decreased BS in bases  Cardiovascular: NSR Reg s1 s2 no M  Abdomen: Soft, nondistended, nontender. J tube in place. Midline incision well healed  Extremeties: WWP, No significant edema appreciated  EVAN: No joint swelling or erythema appreciated  Skin: Dry, no rashes  Psychiatric: Normal affect stating he feels anxious since being brought back to SICU.       LABS:      CBC Full  -  ( 20 Jul 2023 05:30 )  WBC Count : 7.94 K/uL  RBC Count : 2.72 M/uL  Hemoglobin : 8.0 g/dL  Hematocrit : 25.2 %  Platelet Count - Automated : 406 K/uL  Mean Cell Volume : 92.6 fl  Mean Cell Hemoglobin : 29.4 pg  Mean Cell Hemoglobin Concentration : 31.7 gm/dL  Auto Neutrophil # : x  Auto Lymphocyte # : x  Auto Monocyte # : x  Auto Eosinophil # : x  Auto Basophil # : x  Auto Neutrophil % : x  Auto Lymphocyte % : x  Auto Monocyte % : x  Auto Eosinophil % : x  Auto Basophil % : x    07-20    138  |  104  |  12  ----------------------------<  66<L>  3.8   |  21<L>  |  0.58    Ca    8.6      20 Jul 2023 05:30  Phos  3.7     07-20  Mg     1.8     07-20      PT/INR - ( 20 Jul 2023 06:13 )   PT: 15.9 sec;   INR: 1.33          PTT - ( 20 Jul 2023 06:13 )  PTT:34.3 sec      Urinalysis Basic - ( 20 Jul 2023 05:30 )    Color: x / Appearance: x / SG: x / pH: x  Gluc: 66 mg/dL / Ketone: x  / Bili: x / Urobili: x   Blood: x / Protein: x / Nitrite: x   Leuk Esterase: x / RBC: x / WBC x   Sq Epi: x / Non Sq Epi: x / Bacteria: x              RADIOLOGY & ADDITIONAL STUDIES (The following images were personally reviewed):          A/p: 53M w PMHx of HTN, type A aortic dissection s/p Dacron grafts, AV resuspension in 2013, CAD s/p CABG x 1 SVG to RCA (5/2013 with Dr. Medina), seizure disorder, recent admission 3/20-4/14 for replacement of transverse aortic arch, second stage TEVAR and aorto-axillary bypass, AV replacement (bio 23mm), and CABG x 1 (SVG-RCA). Pt found to have endo leak and taken to OR for TEVAR revision on 5/5. Post op course c/b Klebsiella bacteremia (5/15), resp failure requiring intubation on 5/18, Mucus plugging s/p Bronch 5/19 and PEA arrest. Post operatively pt also found to have hemorrhagic pancreatitis with venu-pancreatic abscess possibly 2* to Depakote therefore s/p IR aspiration of peripancreatic fluid collection and paracentesis on 5/22, IR venu-pancreatic abscess drainage and placement of drainage catheter on 5/24. Pt then transferred from CTICU to SICU for septic shock, and further mgmt of hemorrhagic pancreatitis on 5/25. s/p IR placement of 2 new drainage catheters and catheter exchange on 5/26. LUQ drainage 5/30. OR 5/31 for exlap washout & replacement of 3 new JPs and abthera vac with open abdomen. RTOR 6/1, no bleeding, evac of old blood, placement of feeding J-tube, failed extubation due to PNA, completed ABX course and s/p trach 6/5 and decannulation 6/28. stepped down, but returned to SICU (7/2) for respiratory distress. Emergently intubated (7/3) for aspiration pneumonia, sputum cx grew pseudomonas. Extubated on 7/9. Recent CT with B/L chornic pulmonary embolisms and pneumatosis of cecum. Started on heparin gtt - stopped 2/2 to blood per rectum. Stepped up to SICU for HD monitoring on 7/18. Transferred back to SDU on 7/19 IVC filter placed on 7/19.     NEURO: PRN Dilaudid  for pain. Hx seizures: cont vimpat. Off depakote due to concern for drug-related pancreatitis risk.   HEENT: Artificial tears, Torticollis improved  CV: s/p PEA arrest on 5/24 night. TTE (6/14) LVEF 75%, LVH, biatrial enlargement and elevated PA pressure (elevated from previous), mild to mod MR/TR . Cont ASA 81mg  Changed IV lopressor to  qd, and Norvasc 10 qd, Started Labetalol 10 prn   PULM: Aspiration PNA: S/p Reintubation on 7/3 and extubation on 7/9 - Cont Duonebs. Hx of recent ARDS/PNA resolved and trach decannulated 6/28. Recent CT with b/l chornic pulmonary embolisms S/p IVC filter started on heparin gtt - d/c'ed 2/2 to blood in stool. Saturating well on RA.  GI/FEN: Cleared by S&S for minced and moist with thin liquids. GERD: Cont PPI  No signs of aspiration. Holding TF Vital 1.0 Imodium PRN for diarrhea. Protonix daily. Initial presentation with hemorrhagic pancreatitis: s/p multiple drain placements and paracentisis by IR team.   : acute renal failure- been off CVVHD since mid June with renal recovery. Voids.  HEME: Stepped up to ICU for blood per rectum. Now stable   ENDO: FS low - will encourage increased PO intake.   ID: Fungal infection: Cont Nystatin powder Pseudomonas in Sputum: on Meropenem Completed ( 7/7-7/11) .     - PRIOR ABX: Ertapenem (6/13-6/16, 6/18-6/23), meropenem (5/21-6/5, 6/9-6/13, 7/2-7/5), vanco (6/9, 6/18-6/22, 7/2-7/5), caspofungin (5/23-5/30, 6/9-6/12, 6/18-6/21), Ceftriaxone( 6/5- 6/15), Ampicillin (5/21- 5/27). Zosyn: ( 7/- 7/7)   - Prior Cultures: Kleb bacteremia from 5/15 & 5/17. bronch cx kleb, enterobacter (zosyn, erta resistant), E faecalis, strep angionosus from 5/19, pancreatic abscess cx pan-sensitive kleb 5/22. Now off all antibiotics   PPX: SCDs, SQH   LINES: PIVs   WOUNDS/Skin/ DRAINS: J tube.  Calamine lotion for skin   Dispo: Poss SDU today

## 2023-07-20 NOTE — PROGRESS NOTE ADULT - NS ATTEND AMEND GEN_ALL_CORE FT
HTN, seizure disorder s/p TEVAR c/b severe pancreatitis, Klebsiella sepsis, aspiration pneumonia  physical as above  await swallowing evaluation  continue J tube vital feeds at 70/hr with liquid protein  empiric meropenem  increase metoprolol to 25 mg BID
CAD s/p TEVAR, hemorrhagic pancreatitis complicated by klebs bacteremia, s/p multiple drains for source control, on CVVH.    Edematous, starting to slowly remove fluid.  Continue abx( caspo, shin)  Anemia, will trasfuse to above 7.  Ct abdomen    airleak noted during am rounds:  Sedation with propofol 60mg used.  Glides cope blade #4 used to examine position of ETT.  Ett tube found to be out of airways, balloon deflated and tube advanced through the VC into the trachea, balloon inflated. ETT secrured at 26 cm at the lip.  CXR ordered.
HTN, seizure disorder s/p TEVAR with severe pancreatitis, septic shock, pna, PE  physical as above  H/H stable  s/p IVC filter  SQ heparin for now and advance to full AC if stable  tolerating minced and moist diet  continue vimpat  rest as above
HTN, seizure disorder s/p TEVAR c/b hemorrhagic pancreatitis, AHRF, septic shock now with pulmonary embolism and possible blood per rectum  physical as above  for IVC filter  restart AC and follow  continue vimpat  H/H stable  asymptomatic pneumatosis coli to continue to follow  rest as above
HTN, seizure disorder, s/p TEVAR, hemorrhagic pancreatitis, Klebsiella septic shock, AHRF now with aspiration pna  physical as above  FEES noted, careful trial of po  continue J tube feeds  empiric meropenem  increase metoprolol to 50 mg BID for HTN  rest as above
s/p CABG, TEVAR, HTN with hemorrhagic pancreatitis, Klebsiella peritonitis/pna/bacteremia, ATN, anemia of acute blood loss, AHRF  physical as above  now RTOR for further hemostasis, washout, feeding jejunostomy  TPN written  off pressors for now  will discuss trach  continue CVVH  meropenem/ampicillin  follow H/H and platelets  DC nimbex and titrate propofol and fentanyl to RASS hopefully -1 by tomorrow  decision making of high complexity
HTN, seizures with TEVAR c/b pancreatitis and Klebsiella sepsis, aspiration pna  physical as above  tolerating minced and moist diet  DC glycopyrrholate, mucomyst  PT  follow off antibiotics  continue full J tube feeds for now

## 2023-07-20 NOTE — PROGRESS NOTE ADULT - NS ATTEND OPT1 GEN_ALL_CORE
I independently performed the documented:
I attest my time as attending is greater than 50% of the total combined time spent on qualifying patient care activities by the PA/NP and attending.
I independently performed the documented:
I independently performed the documented:

## 2023-07-20 NOTE — PROGRESS NOTE ADULT - SUBJECTIVE AND OBJECTIVE BOX
STATUS POST:    5/5: second stage TEVAR  5/13: IR Celiac, SMA, splenic, and left gastric artery angiogram reveals no pseudoaneurysm or any active bleeding.  5/22:  IR Aspiration of left peripancreatic fluid collection  (15cc sanguinous) and paracentesis (4L clear yellow fluid)  5/24: IR L peripancreatic abscess collection drainage and placement of 12F drainage catheter  5/25: Rosalee upsized existing abscess drain to 16F, placed new drain to another abscess collection on left, and two drain for ascites.  5/26: Placement of two new drainage catheters and exchange of two   drainage catheters  5/30: IR LUQ drainage of 400cc purulent fluid  5/31: ex lap, wash out of ascites/old blood/scant new blood, all CODIE drain removal, CODIE x3 placement, abdomen open, UOP 0, EBL??  6/1: No bleeding, evac of old blood, placement of feeding J-tube, abd closure  6/5: Bedside trach (Pulm)  6/9: Bronchoscopy, lavage  7/19: IVC filter placement    ON: IV Tyl x 1 for pain; BiPAP for sleep/comfort     SUBJECTIVE: Patient seen and examined bedside by chief resident, patient has no acute complaints. Tolerating diet, -n/-v/+f/+bm. OOBA. Denies sob/forrest/dizziness/cp    aspirin  chewable 81 milliGRAM(s) Oral daily  heparin   Injectable 5000 Unit(s) SubCutaneous every 8 hours  labetalol Injectable 10 milliGRAM(s) IV Push every 6 hours PRN  metoprolol succinate  milliGRAM(s) Oral daily      Vital Signs Last 24 Hrs  T(C): 36.6 (20 Jul 2023 05:16), Max: 37.1 (19 Jul 2023 22:13)  T(F): 97.9 (20 Jul 2023 05:16), Max: 98.7 (19 Jul 2023 22:13)  HR: 87 (20 Jul 2023 08:00) (82 - 112)  BP: 169/103 (20 Jul 2023 08:00) (1/- - 184/94)  BP(mean): 131 (20 Jul 2023 08:00) (117 - 138)  RR: 20 (20 Jul 2023 08:00) (13 - 89)  SpO2: 97% (20 Jul 2023 08:00) (94% - 100%)    Parameters below as of 20 Jul 2023 08:00  Patient On (Oxygen Delivery Method): room air      I&O's Detail    19 Jul 2023 07:01  -  20 Jul 2023 07:00  --------------------------------------------------------  IN:    IV PiggyBack: 100 mL    IV PiggyBack: 100 mL  Total IN: 200 mL    OUT:    Voided (mL): 400 mL  Total OUT: 400 mL    Total NET: -200 mL          General: NAD, resting comfortably in bed  C/V: NSR  Pulm: Nonlabored breathing, no respiratory distress, room air  Abd: soft, NT/ND. G-tube. No rebound or guarding  Extrem: WWP, no edema, SCDs in place        LABS:                        8.0    7.94  )-----------( 406      ( 20 Jul 2023 05:30 )             25.2     07-20    138  |  104  |  12  ----------------------------<  66<L>  3.8   |  21<L>  |  0.58    Ca    8.6      20 Jul 2023 05:30  Phos  3.7     07-20  Mg     1.8     07-20      PT/INR - ( 20 Jul 2023 06:13 )   PT: 15.9 sec;   INR: 1.33          PTT - ( 20 Jul 2023 06:13 )  PTT:34.3 sec  Urinalysis Basic - ( 20 Jul 2023 05:30 )    Color: x / Appearance: x / SG: x / pH: x  Gluc: 66 mg/dL / Ketone: x  / Bili: x / Urobili: x   Blood: x / Protein: x / Nitrite: x   Leuk Esterase: x / RBC: x / WBC x   Sq Epi: x / Non Sq Epi: x / Bacteria: x        RADIOLOGY & ADDITIONAL STUDIES:

## 2023-07-20 NOTE — PROGRESS NOTE ADULT - ASSESSMENT
A/p: 53M w PMHx of HTN, type A aortic dissection s/p Dacron grafts, AV resuspension in 2013, CAD s/p CABG x 1 SVG to RCA (5/2013 with Dr. Meidna), seizure disorder, recent admission 3/20-4/14 for replacement of transverse aortic arch, second stage TEVAR and aorto-axillary bypass, AV replacement (bio 23mm), and CABG x 1 (SVG-RCA). Pt found to have endo leak and taken to OR for TEVAR revision on 5/5. Post op course c/b Klebsiella bacteremia (5/15), resp failure requiring intubation on 5/18, Mucus plugging s/p Bronch 5/19 and PEA arrest. Post operatively pt also found to have hemorrhagic pancreatitis with veun-pancreatic abscess possibly 2* to Depakote therefore s/p IR aspiration of peripancreatic fluid collection and paracentesis on 5/22, IR venu-pancreatic abscess drainage and placement of drainage catheter on 5/24. Pt then transferred from CTICU to SICU for septic shock, and further mgmt of hemorrhagic pancreatitis on 5/25. s/p IR placement of 2 new drainage catheters and catheter exchange on 5/26. LUQ drainage 5/30. OR 5/31 for exlap washout & replacement of 3 new JPs and abthera vac with open abdomen. RTOR 6/1, no bleeding, evac of old blood, placement of feeding J-tube, failed extubation due to PNA, completed ABX course and s/p trach 6/5 and decannulation 6/28. stepped down, but returned to SICU (7/2) for respiratory distress. Emergently intubated (7/3) for aspiration pneumonia, sputum cx grew pseudomonas. Extubated on 7/9. Recent CT with B/L chornic pulmonary embolisms and pneumatosis of cecum. Started on heparin gtt - stopped 2/2 to blood per rectum. Stepped up to SICU for HD monitoring on 7/18. Transferred back to SDU on 7/19 IVC filter placed on 7/19.     Pain/nausea control  SQH/ASA 81mg   metoprolol 5mg IVP [id number]   IVC filter  PPI  mISS  J tube  Dispo planning

## 2023-07-21 LAB
ANION GAP SERPL CALC-SCNC: 9 MMOL/L — SIGNIFICANT CHANGE UP (ref 5–17)
BUN SERPL-MCNC: 10 MG/DL — SIGNIFICANT CHANGE UP (ref 7–23)
CALCIUM SERPL-MCNC: 8.1 MG/DL — LOW (ref 8.4–10.5)
CHLORIDE SERPL-SCNC: 107 MMOL/L — SIGNIFICANT CHANGE UP (ref 96–108)
CO2 SERPL-SCNC: 20 MMOL/L — LOW (ref 22–31)
CREAT SERPL-MCNC: 0.51 MG/DL — SIGNIFICANT CHANGE UP (ref 0.5–1.3)
EGFR: 120 ML/MIN/1.73M2 — SIGNIFICANT CHANGE UP
FIBRINOGEN AG PPP IA-MCNC: 211 MG/DL — LOW (ref 233–496)
GLUCOSE SERPL-MCNC: 85 MG/DL — SIGNIFICANT CHANGE UP (ref 70–99)
HCT VFR BLD CALC: 25.7 % — LOW (ref 39–50)
HGB BLD-MCNC: 8.2 G/DL — LOW (ref 13–17)
MAGNESIUM SERPL-MCNC: 1.7 MG/DL — SIGNIFICANT CHANGE UP (ref 1.6–2.6)
MCHC RBC-ENTMCNC: 29.8 PG — SIGNIFICANT CHANGE UP (ref 27–34)
MCHC RBC-ENTMCNC: 31.9 GM/DL — LOW (ref 32–36)
MCV RBC AUTO: 93.5 FL — SIGNIFICANT CHANGE UP (ref 80–100)
NRBC # BLD: 0 /100 WBCS — SIGNIFICANT CHANGE UP (ref 0–0)
PHOSPHATE SERPL-MCNC: 3.3 MG/DL — SIGNIFICANT CHANGE UP (ref 2.5–4.5)
PLATELET # BLD AUTO: 356 K/UL — SIGNIFICANT CHANGE UP (ref 150–400)
POTASSIUM SERPL-MCNC: 3.7 MMOL/L — SIGNIFICANT CHANGE UP (ref 3.5–5.3)
POTASSIUM SERPL-SCNC: 3.7 MMOL/L — SIGNIFICANT CHANGE UP (ref 3.5–5.3)
RBC # BLD: 2.75 M/UL — LOW (ref 4.2–5.8)
RBC # FLD: 17.9 % — HIGH (ref 10.3–14.5)
SODIUM SERPL-SCNC: 136 MMOL/L — SIGNIFICANT CHANGE UP (ref 135–145)
WBC # BLD: 7.49 K/UL — SIGNIFICANT CHANGE UP (ref 3.8–10.5)
WBC # FLD AUTO: 7.49 K/UL — SIGNIFICANT CHANGE UP (ref 3.8–10.5)

## 2023-07-21 RX ORDER — POTASSIUM CHLORIDE 20 MEQ
20 PACKET (EA) ORAL ONCE
Refills: 0 | Status: COMPLETED | OUTPATIENT
Start: 2023-07-21 | End: 2023-07-21

## 2023-07-21 RX ORDER — MAGNESIUM SULFATE 500 MG/ML
1 VIAL (ML) INJECTION ONCE
Refills: 0 | Status: COMPLETED | OUTPATIENT
Start: 2023-07-21 | End: 2023-07-21

## 2023-07-21 RX ADMIN — Medication 100 MILLIGRAM(S): at 06:39

## 2023-07-21 RX ADMIN — LACOSAMIDE 140 MILLIGRAM(S): 50 TABLET ORAL at 18:37

## 2023-07-21 RX ADMIN — Medication 20 MILLIEQUIVALENT(S): at 14:27

## 2023-07-21 RX ADMIN — Medication 2 MILLIGRAM(S): at 06:39

## 2023-07-21 RX ADMIN — OXYCODONE HYDROCHLORIDE 2.5 MILLIGRAM(S): 5 TABLET ORAL at 08:38

## 2023-07-21 RX ADMIN — Medication 100 GRAM(S): at 14:27

## 2023-07-21 RX ADMIN — Medication 10 MILLIGRAM(S): at 05:16

## 2023-07-21 RX ADMIN — CALAMINE AND ZINC OXIDE AND PHENOL 1 APPLICATION(S): 160; 10 LOTION TOPICAL at 18:38

## 2023-07-21 RX ADMIN — OXYCODONE HYDROCHLORIDE 2.5 MILLIGRAM(S): 5 TABLET ORAL at 08:15

## 2023-07-21 RX ADMIN — CHLORHEXIDINE GLUCONATE 1 APPLICATION(S): 213 SOLUTION TOPICAL at 06:40

## 2023-07-21 RX ADMIN — Medication 2 MILLIGRAM(S): at 11:50

## 2023-07-21 RX ADMIN — HEPARIN SODIUM 5000 UNIT(S): 5000 INJECTION INTRAVENOUS; SUBCUTANEOUS at 22:21

## 2023-07-21 RX ADMIN — OXYCODONE HYDROCHLORIDE 5 MILLIGRAM(S): 5 TABLET ORAL at 23:15

## 2023-07-21 RX ADMIN — AMLODIPINE BESYLATE 10 MILLIGRAM(S): 2.5 TABLET ORAL at 06:40

## 2023-07-21 RX ADMIN — LACOSAMIDE 140 MILLIGRAM(S): 50 TABLET ORAL at 07:43

## 2023-07-21 RX ADMIN — Medication 81 MILLIGRAM(S): at 11:50

## 2023-07-21 RX ADMIN — Medication 15 MILLILITER(S): at 12:03

## 2023-07-21 RX ADMIN — HEPARIN SODIUM 5000 UNIT(S): 5000 INJECTION INTRAVENOUS; SUBCUTANEOUS at 14:27

## 2023-07-21 RX ADMIN — PANTOPRAZOLE SODIUM 40 MILLIGRAM(S): 20 TABLET, DELAYED RELEASE ORAL at 11:51

## 2023-07-21 RX ADMIN — Medication 2 MILLIGRAM(S): at 18:38

## 2023-07-21 RX ADMIN — OXYCODONE HYDROCHLORIDE 5 MILLIGRAM(S): 5 TABLET ORAL at 22:21

## 2023-07-21 RX ADMIN — HEPARIN SODIUM 5000 UNIT(S): 5000 INJECTION INTRAVENOUS; SUBCUTANEOUS at 06:40

## 2023-07-21 RX ADMIN — CALAMINE AND ZINC OXIDE AND PHENOL 1 APPLICATION(S): 160; 10 LOTION TOPICAL at 06:40

## 2023-07-21 NOTE — PROGRESS NOTE ADULT - ASSESSMENT
A/p: 53M w PMHx of HTN, type A aortic dissection s/p Dacron grafts, AV resuspension in 2013, CAD s/p CABG x 1 SVG to RCA (5/2013 with Dr. Medina), seizure disorder, recent admission 3/20-4/14 for replacement of transverse aortic arch, second stage TEVAR and aorto-axillary bypass, AV replacement (bio 23mm), and CABG x 1 (SVG-RCA). Pt found to have endo leak and taken to OR for TEVAR revision on 5/5. Post op course c/b Klebsiella bacteremia (5/15), resp failure requiring intubation on 5/18, Mucus plugging s/p Bronch 5/19 and PEA arrest. Post operatively pt also found to have hemorrhagic pancreatitis with venu-pancreatic abscess possibly 2* to Depakote therefore s/p IR aspiration of peripancreatic fluid collection and paracentesis on 5/22, IR venu-pancreatic abscess drainage and placement of drainage catheter on 5/24. Pt then transferred from CTICU to SICU for septic shock, and further mgmt of hemorrhagic pancreatitis on 5/25. s/p IR placement of 2 new drainage catheters and catheter exchange on 5/26. LUQ drainage 5/30. OR 5/31 for exlap washout & replacement of 3 new JPs and abthera vac with open abdomen. RTOR 6/1, no bleeding, evac of old blood, placement of feeding J-tube, failed extubation due to PNA, completed ABX course and s/p trach 6/5 and decannulation 6/28. stepped down, but returned to SICU (7/2) for respiratory distress. Emergently intubated (7/3) for aspiration pneumonia, sputum cx grew pseudomonas. Extubated on 7/9. Recent CT with B/L chornic pulmonary embolisms and pneumatosis of cecum. Started on heparin gtt - stopped 2/2 to blood per rectum. Stepped up to SICU for HD monitoring on 7/18. Transferred back to SDU on 7/19 IVC filter placed on 7/19.  7/20 Restarted SQH and HTN PO meds    Pain/nausea control  SQH/ASA 81mg   metoprolol 5mg IVP [id number]   IVC filter  PPI  f/up calorie count  mISS  J tube  Dispo planning with SW

## 2023-07-21 NOTE — PROGRESS NOTE ADULT - SUBJECTIVE AND OBJECTIVE BOX
SUBJECTIVE:  Patient was seen at bedside this AM with chief resident. Patient is doing well this AM. Denies pain, nausea, or vomiting. Patient currently denies BM or flatus.    MEDICATIONS  (STANDING):  amLODIPine   Tablet 10 milliGRAM(s) Oral daily  aspirin  chewable 81 milliGRAM(s) Oral daily  calamine/zinc oxide Lotion 1 Application(s) Topical two times a day  chlorhexidine 2% Cloths 1 Application(s) Topical <User Schedule>  heparin   Injectable 5000 Unit(s) SubCutaneous every 8 hours  lacosamide IVPB 200 milliGRAM(s) IV Intermittent every 12 hours  loperamide Liquid 2 milliGRAM(s) Enteral Tube every 6 hours  metoprolol succinate  milliGRAM(s) Oral daily  multivitamin/minerals/iron Oral Solution (CENTRUM) 15 milliLiter(s) Oral daily  pantoprazole  Injectable 40 milliGRAM(s) IV Push daily    MEDICATIONS  (PRN):  benzocaine/menthol Lozenge 1 Lozenge Oral every 6 hours PRN Sore Throat  labetalol Injectable 10 milliGRAM(s) IV Push every 6 hours PRN Systolic blood pressure > 160  ondansetron Injectable 4 milliGRAM(s) IV Push every 8 hours PRN Nausea and/or Vomiting  oxyCODONE    IR 2.5 milliGRAM(s) Oral every 6 hours PRN Moderate Pain (4 - 6)  oxyCODONE    IR 5 milliGRAM(s) Oral every 6 hours PRN Severe Pain (7 - 10)      Vital Signs Last 24 Hrs  T(C): 36.9 (21 Jul 2023 09:59), Max: 37.1 (20 Jul 2023 16:52)  T(F): 98.5 (21 Jul 2023 09:59), Max: 98.8 (21 Jul 2023 04:42)  HR: 92 (21 Jul 2023 09:25) (80 - 99)  BP: 150/90 (21 Jul 2023 08:55) (150/90 - 177/106)  BP(mean): 114 (21 Jul 2023 08:55) (114 - 137)  RR: 17 (21 Jul 2023 08:55) (13 - 25)  SpO2: 96% (21 Jul 2023 09:25) (93% - 100%)    Parameters below as of 21 Jul 2023 08:55  Patient On (Oxygen Delivery Method): room air        Physical Exam:  General: NAD, resting comfortably in bed  Pulmonary: Nonlabored breathing, no respiratory distress  Cardiovascular: NSR  Abdominal: soft, NT/ND  Extremities: WWP, normal strength; groin soft at IVC filter access site  Neuro: A/O x 3, CNs II-XII grossly intact, no focal deficits, normal motor/sensation  Pulses: palpable distal pulses    I&O's Summary    20 Jul 2023 07:01  -  21 Jul 2023 07:00  --------------------------------------------------------  IN: 100 mL / OUT: 1250 mL / NET: -1150 mL        LABS:                        8.2    7.49  )-----------( 356      ( 21 Jul 2023 06:34 )             25.7     07-21    136  |  107  |  10  ----------------------------<  85  3.7   |  20<L>  |  0.51    Ca    8.1<L>      21 Jul 2023 06:34  Phos  3.3     07-21  Mg     1.7     07-21      PT/INR - ( 20 Jul 2023 06:13 )   PT: 15.9 sec;   INR: 1.33          PTT - ( 20 Jul 2023 06:13 )  PTT:34.3 sec  Urinalysis Basic - ( 21 Jul 2023 06:34 )    Color: x / Appearance: x / SG: x / pH: x  Gluc: 85 mg/dL / Ketone: x  / Bili: x / Urobili: x   Blood: x / Protein: x / Nitrite: x   Leuk Esterase: x / RBC: x / WBC x   Sq Epi: x / Non Sq Epi: x / Bacteria: x      CAPILLARY BLOOD GLUCOSE            RADIOLOGY & ADDITIONAL STUDIES:

## 2023-07-21 NOTE — ADVANCED PRACTICE NURSE CONSULT - APN SPECIALTY LIST
Wound Ostomy Care

## 2023-07-21 NOTE — ADVANCED PRACTICE NURSE CONSULT - ASSESSMENT
Sacral/coccyx unstageable pressure injury with 80% slough, 20% red, granulating tissue and epithelialized edges. Wound measuring 2 cm x 12 cm draining small amount of serosanguinous exudate.   Left thoracic area, left ear, left shoulder and left lower back pressure injuries healed.  Skin tears on left antecubital area healed.  Left knee healing stage 3 pressure injury with 100% red granulation tissue with periwound scar formation. Wound measuring 0.5 cm x 1.5 cm x 0.1 cm draining small amount of serosanguinous exudate.   WOCN instructed the patient on turning and repositioning in bed to promote wound healing and prevent further skin breakdown and use of heel protectors to offload heels. The patient5 verbalized understanding.   Patient being turned and repositioned with positioning pillows and wearing heels protectors to offload heels.

## 2023-07-21 NOTE — ADVANCED PRACTICE NURSE CONSULT - RECOMMEDATIONS
Sacral/coccyx and left knee pressure injuries - cleanse with normal saline, apply Triad cream, cover with foam dressing every other day.   Left thoracic area, left ear, left shoulder and left lower back healed pressure injuries - apply Cavilon skin prep, cover with foam dressing every other day.   WOCN discussed assessment and recommendations with the patient

## 2023-07-21 NOTE — ADVANCED PRACTICE NURSE CONSULT - REASON FOR CONSULT
WOC nurse consult to re-assess pressure injuries. 
WOC nurse follow up to assess sacral/coccyx skin. Patient in the OR. WOCN will follow up. 
WOC nurse follow up to assess sacral/coccyx skin breakdown. Patient in the OR. WOCN will follow up.
WOC nurse consult to assess pressure injuries. 
WOCN follow up to assess pressure injuries. 
knee wound

## 2023-07-22 LAB
ANION GAP SERPL CALC-SCNC: 8 MMOL/L — SIGNIFICANT CHANGE UP (ref 5–17)
BUN SERPL-MCNC: 7 MG/DL — SIGNIFICANT CHANGE UP (ref 7–23)
CALCIUM SERPL-MCNC: 8.2 MG/DL — LOW (ref 8.4–10.5)
CHLORIDE SERPL-SCNC: 104 MMOL/L — SIGNIFICANT CHANGE UP (ref 96–108)
CO2 SERPL-SCNC: 21 MMOL/L — LOW (ref 22–31)
CREAT SERPL-MCNC: 0.51 MG/DL — SIGNIFICANT CHANGE UP (ref 0.5–1.3)
EGFR: 120 ML/MIN/1.73M2 — SIGNIFICANT CHANGE UP
GLUCOSE SERPL-MCNC: 81 MG/DL — SIGNIFICANT CHANGE UP (ref 70–99)
HCT VFR BLD CALC: 29.8 % — LOW (ref 39–50)
HGB BLD-MCNC: 9.2 G/DL — LOW (ref 13–17)
MAGNESIUM SERPL-MCNC: 1.8 MG/DL — SIGNIFICANT CHANGE UP (ref 1.6–2.6)
MCHC RBC-ENTMCNC: 29.4 PG — SIGNIFICANT CHANGE UP (ref 27–34)
MCHC RBC-ENTMCNC: 30.9 GM/DL — LOW (ref 32–36)
MCV RBC AUTO: 95.2 FL — SIGNIFICANT CHANGE UP (ref 80–100)
NRBC # BLD: 0 /100 WBCS — SIGNIFICANT CHANGE UP (ref 0–0)
PHOSPHATE SERPL-MCNC: 3.4 MG/DL — SIGNIFICANT CHANGE UP (ref 2.5–4.5)
PLATELET # BLD AUTO: 377 K/UL — SIGNIFICANT CHANGE UP (ref 150–400)
POTASSIUM SERPL-MCNC: 3.7 MMOL/L — SIGNIFICANT CHANGE UP (ref 3.5–5.3)
POTASSIUM SERPL-SCNC: 3.7 MMOL/L — SIGNIFICANT CHANGE UP (ref 3.5–5.3)
RBC # BLD: 3.13 M/UL — LOW (ref 4.2–5.8)
RBC # FLD: 18 % — HIGH (ref 10.3–14.5)
SODIUM SERPL-SCNC: 133 MMOL/L — LOW (ref 135–145)
WBC # BLD: 7.23 K/UL — SIGNIFICANT CHANGE UP (ref 3.8–10.5)
WBC # FLD AUTO: 7.23 K/UL — SIGNIFICANT CHANGE UP (ref 3.8–10.5)

## 2023-07-22 RX ORDER — POTASSIUM CHLORIDE 20 MEQ
20 PACKET (EA) ORAL ONCE
Refills: 0 | Status: COMPLETED | OUTPATIENT
Start: 2023-07-22 | End: 2023-07-22

## 2023-07-22 RX ORDER — MAGNESIUM SULFATE 500 MG/ML
1 VIAL (ML) INJECTION ONCE
Refills: 0 | Status: COMPLETED | OUTPATIENT
Start: 2023-07-22 | End: 2023-07-22

## 2023-07-22 RX ADMIN — CHLORHEXIDINE GLUCONATE 1 APPLICATION(S): 213 SOLUTION TOPICAL at 06:31

## 2023-07-22 RX ADMIN — LACOSAMIDE 140 MILLIGRAM(S): 50 TABLET ORAL at 19:48

## 2023-07-22 RX ADMIN — Medication 2 MILLIGRAM(S): at 06:31

## 2023-07-22 RX ADMIN — Medication 100 MILLIGRAM(S): at 06:30

## 2023-07-22 RX ADMIN — HEPARIN SODIUM 5000 UNIT(S): 5000 INJECTION INTRAVENOUS; SUBCUTANEOUS at 22:26

## 2023-07-22 RX ADMIN — HEPARIN SODIUM 5000 UNIT(S): 5000 INJECTION INTRAVENOUS; SUBCUTANEOUS at 15:40

## 2023-07-22 RX ADMIN — Medication 20 MILLIEQUIVALENT(S): at 08:28

## 2023-07-22 RX ADMIN — PANTOPRAZOLE SODIUM 40 MILLIGRAM(S): 20 TABLET, DELAYED RELEASE ORAL at 13:15

## 2023-07-22 RX ADMIN — CALAMINE AND ZINC OXIDE AND PHENOL 1 APPLICATION(S): 160; 10 LOTION TOPICAL at 06:31

## 2023-07-22 RX ADMIN — OXYCODONE HYDROCHLORIDE 5 MILLIGRAM(S): 5 TABLET ORAL at 07:35

## 2023-07-22 RX ADMIN — Medication 2 MILLIGRAM(S): at 13:50

## 2023-07-22 RX ADMIN — AMLODIPINE BESYLATE 10 MILLIGRAM(S): 2.5 TABLET ORAL at 06:30

## 2023-07-22 RX ADMIN — Medication 81 MILLIGRAM(S): at 13:16

## 2023-07-22 RX ADMIN — LACOSAMIDE 140 MILLIGRAM(S): 50 TABLET ORAL at 06:30

## 2023-07-22 RX ADMIN — Medication 2 MILLIGRAM(S): at 19:49

## 2023-07-22 RX ADMIN — HEPARIN SODIUM 5000 UNIT(S): 5000 INJECTION INTRAVENOUS; SUBCUTANEOUS at 06:30

## 2023-07-22 RX ADMIN — Medication 2 MILLIGRAM(S): at 00:39

## 2023-07-22 RX ADMIN — Medication 100 GRAM(S): at 08:28

## 2023-07-22 RX ADMIN — OXYCODONE HYDROCHLORIDE 5 MILLIGRAM(S): 5 TABLET ORAL at 06:30

## 2023-07-22 RX ADMIN — CALAMINE AND ZINC OXIDE AND PHENOL 1 APPLICATION(S): 160; 10 LOTION TOPICAL at 19:49

## 2023-07-22 RX ADMIN — Medication 15 MILLILITER(S): at 13:16

## 2023-07-22 NOTE — PROGRESS NOTE ADULT - ASSESSMENT
A/p: 53M w PMHx of HTN, type A aortic dissection s/p Dacron grafts, AV resuspension in 2013, CAD s/p CABG x 1 SVG to RCA (5/2013 with Dr. Medina), seizure disorder, recent admission 3/20-4/14 for replacement of transverse aortic arch, second stage TEVAR and aorto-axillary bypass, AV replacement (bio 23mm), and CABG x 1 (SVG-RCA). Pt found to have endo leak and taken to OR for TEVAR revision on 5/5. Post op course c/b Klebsiella bacteremia (5/15), resp failure requiring intubation on 5/18, Mucus plugging s/p Bronch 5/19 and PEA arrest. Post operatively pt also found to have hemorrhagic pancreatitis with venu-pancreatic abscess possibly 2* to Depakote therefore s/p IR aspiration of peripancreatic fluid collection and paracentesis on 5/22, IR venu-pancreatic abscess drainage and placement of drainage catheter on 5/24. Pt then transferred from CTICU to SICU for septic shock, and further mgmt of hemorrhagic pancreatitis on 5/25. s/p IR placement of 2 new drainage catheters and catheter exchange on 5/26. LUQ drainage 5/30. OR 5/31 for exlap washout & replacement of 3 new JPs and abthera vac with open abdomen. RTOR 6/1, no bleeding, evac of old blood, placement of feeding J-tube, failed extubation due to PNA, completed ABX course and s/p trach 6/5 and decannulation 6/28. stepped down, but returned to SICU (7/2) for respiratory distress. Emergently intubated (7/3) for aspiration pneumonia, sputum cx grew pseudomonas. Extubated on 7/9. Recent CT with B/L chornic pulmonary embolisms and pneumatosis of cecum. Started on heparin gtt - stopped 2/2 to blood per rectum. Stepped up to SICU for HD monitoring on 7/18. Transferred back to SDU on 7/19 IVC filter placed on 7/19.     NEURO: PRN oxy for pain. Hx seizures: cont vimpat. Off depakote due to concern for drug-related pancreatitis risk.   HEENT: Artificial tears, Torticollis improved  CV: s/p PEA arrest on 5/24 night. TTE (6/14) LVEF 75%, LVH, biatrial enlargement and elevated PA pressure (elevated from previous), mild to mod MR/TR . Cont ASA 81mg  Changed IV lopressor to  qd, and Norvasc 10 qd, Started Labetalol 10 prn   PULM: Aspiration PNA: S/p Reintubation on 7/3 and extubation on 7/9 - Cont Duonebs. Hx of recent ARDS/PNA resolved and trach decannulated 6/28. Recent CT with b/l chornic pulmonary embolisms S/p IVC filter started on heparin gtt - d/c'ed 2/2 to blood in stool. Saturating well on RA.  GI/FEN: Cleared by S&S for minced and moist with thin liquids. GERD: Cont PPI  No signs of aspiration. Holding TF Vital 1.0 Imodium PRN for diarrhea. Protonix daily. Initial presentation with hemorrhagic pancreatitis: s/p multiple drain placements and paracentisis by IR team.   : acute renal failure- been off CVVHD since mid June with renal recovery. Voids.  HEME: Stepped up to ICU for blood per rectum. Now stable   ENDO: mISS FS low - will encourage increased PO intake.   ID: Fungal infection: Cont Nystatin powder Pseudomonas in Sputum: on Meropenem Completed ( 7/7-7/11) .     - PRIOR ABX: Ertapenem (6/13-6/16, 6/18-6/23), meropenem (5/21-6/5, 6/9-6/13, 7/2-7/5), vanco (6/9, 6/18-6/22, 7/2-7/5), caspofungin (5/23-5/30, 6/9-6/12, 6/18-6/21), Ceftriaxone( 6/5- 6/15), Ampicillin (5/21- 5/27). Zosyn: ( 7/- 7/7)   - Prior Cultures: Kleb bacteremia from 5/15 & 5/17. bronch cx kleb, enterobacter (zosyn, erta resistant), E faecalis, strep angionosus from 5/19, pancreatic abscess cx pan-sensitive kleb 5/22. Now off all antibiotics   PPX: SCDs, SQH   LINES: PIVs   WOUNDS/Skin/ DRAINS: J tube.  Calamine lotion for skin   Dispo: Poss SDU today

## 2023-07-22 NOTE — PROGRESS NOTE ADULT - SUBJECTIVE AND OBJECTIVE BOX
ON:       SUBJECTIVE: Pt seen and examined at bedside this am by ICU team. Patient is lying comfortably in bed with no complaints. Tolerating diet, pain well controlled with current regimen. Patient denies fever, nausea, vomiting, chest pain, and shortness of breath. Patient is passing gas and having bowel movements.       MEDICATIONS  (STANDING):  amLODIPine   Tablet 10 milliGRAM(s) Oral daily  aspirin  chewable 81 milliGRAM(s) Oral daily  calamine/zinc oxide Lotion 1 Application(s) Topical two times a day  chlorhexidine 2% Cloths 1 Application(s) Topical <User Schedule>  heparin   Injectable 5000 Unit(s) SubCutaneous every 8 hours  lacosamide IVPB 200 milliGRAM(s) IV Intermittent every 12 hours  loperamide Liquid 2 milliGRAM(s) Enteral Tube every 6 hours  metoprolol succinate  milliGRAM(s) Oral daily  multivitamin/minerals/iron Oral Solution (CENTRUM) 15 milliLiter(s) Oral daily  pantoprazole  Injectable 40 milliGRAM(s) IV Push daily    MEDICATIONS  (PRN):  benzocaine/menthol Lozenge 1 Lozenge Oral every 6 hours PRN Sore Throat  labetalol Injectable 10 milliGRAM(s) IV Push every 6 hours PRN Systolic blood pressure > 160  ondansetron Injectable 4 milliGRAM(s) IV Push every 8 hours PRN Nausea and/or Vomiting  oxyCODONE    IR 2.5 milliGRAM(s) Oral every 6 hours PRN Moderate Pain (4 - 6)  oxyCODONE    IR 5 milliGRAM(s) Oral every 6 hours PRN Severe Pain (7 - 10)      Drips:     ICU Vital Signs Last 24 Hrs  T(C): 37.3 (22 Jul 2023 04:49), Max: 37.3 (22 Jul 2023 04:49)  T(F): 99.2 (22 Jul 2023 04:49), Max: 99.2 (22 Jul 2023 04:49)  HR: 82 (22 Jul 2023 05:58) (74 - 98)  BP: 159/99 (22 Jul 2023 04:25) (149/95 - 169/96)  BP(mean): 123 (22 Jul 2023 04:25) (114 - 124)  ABP: --  ABP(mean): --  RR: 18 (22 Jul 2023 05:58) (12 - 18)  SpO2: 98% (22 Jul 2023 05:58) (94% - 100%)    O2 Parameters below as of 22 Jul 2023 05:58  Patient On (Oxygen Delivery Method): room air            Physical Exam:  General: NAD  HEENT: NC/AT, EOMI, PERRLA, normal hearing, no oral lesions, neck supple w/o LAD  Pulmonary: Nonlabored breathing, no respiratory distress, CTA-B  Cardiovascular: NSR, no murmurs  Abdominal: soft, NT/ND, +BS, no organomegaly  Extremities: WWP, 5/5 strength x 4, no clubbing/cyanosis/edema  Neuro: A/O x3, CNs II-XII grossly intact, normal motor/sensation, no focal deficits  Pulses: palpable distal pulses    Lines/tubes/drains:  Cantrell:	      Vent settings:      I&O's Summary    20 Jul 2023 07:01  -  21 Jul 2023 07:00  --------------------------------------------------------  IN: 100 mL / OUT: 1250 mL / NET: -1150 mL    21 Jul 2023 07:01  -  22 Jul 2023 06:32  --------------------------------------------------------  IN: 240 mL / OUT: 950 mL / NET: -710 mL        LABS:                        9.2    7.23  )-----------( 377      ( 22 Jul 2023 06:10 )             29.8     07-21    136  |  107  |  10  ----------------------------<  85  3.7   |  20<L>  |  0.51    Ca    8.1<L>      21 Jul 2023 06:34  Phos  3.3     07-21  Mg     1.7     07-21        Urinalysis Basic - ( 21 Jul 2023 06:34 )    Color: x / Appearance: x / SG: x / pH: x  Gluc: 85 mg/dL / Ketone: x  / Bili: x / Urobili: x   Blood: x / Protein: x / Nitrite: x   Leuk Esterase: x / RBC: x / WBC x   Sq Epi: x / Non Sq Epi: x / Bacteria: x      CAPILLARY BLOOD GLUCOSE            Cultures:    RADIOLOGY & ADDITIONAL STUDIES:     7/22: HypoNatremic 133, Repleated K/ Mag. Net -650mls today  ON: SABRINA      SUBJECTIVE: Pt seen and examined at bedside this am by surgery team. Patient is lying comfortably in bed with no complaints. Able to speak with ease, respond to questions, and breathing without any increased effort.  Tolerating diet, pain well controlled with current regimen. Patient denies fever, nausea, vomiting, chest pain, and shortness of breath. Patient is passing gas.       MEDICATIONS  (STANDING):  amLODIPine   Tablet 10 milliGRAM(s) Oral daily  aspirin  chewable 81 milliGRAM(s) Oral daily  calamine/zinc oxide Lotion 1 Application(s) Topical two times a day  chlorhexidine 2% Cloths 1 Application(s) Topical <User Schedule>  heparin   Injectable 5000 Unit(s) SubCutaneous every 8 hours  lacosamide IVPB 200 milliGRAM(s) IV Intermittent every 12 hours  loperamide Liquid 2 milliGRAM(s) Enteral Tube every 6 hours  metoprolol succinate  milliGRAM(s) Oral daily  multivitamin/minerals/iron Oral Solution (CENTRUM) 15 milliLiter(s) Oral daily  pantoprazole  Injectable 40 milliGRAM(s) IV Push daily    MEDICATIONS  (PRN):  benzocaine/menthol Lozenge 1 Lozenge Oral every 6 hours PRN Sore Throat  labetalol Injectable 10 milliGRAM(s) IV Push every 6 hours PRN Systolic blood pressure > 160  ondansetron Injectable 4 milliGRAM(s) IV Push every 8 hours PRN Nausea and/or Vomiting  oxyCODONE    IR 2.5 milliGRAM(s) Oral every 6 hours PRN Moderate Pain (4 - 6)  oxyCODONE    IR 5 milliGRAM(s) Oral every 6 hours PRN Severe Pain (7 - 10)      Drips:     ICU Vital Signs Last 24 Hrs  T(C): 37.3 (22 Jul 2023 04:49), Max: 37.3 (22 Jul 2023 04:49)  T(F): 99.2 (22 Jul 2023 04:49), Max: 99.2 (22 Jul 2023 04:49)  HR: 82 (22 Jul 2023 05:58) (74 - 98)  BP: 159/99 (22 Jul 2023 04:25) (149/95 - 169/96)  BP(mean): 123 (22 Jul 2023 04:25) (114 - 124)  ABP: --  ABP(mean): --  RR: 18 (22 Jul 2023 05:58) (12 - 18)  SpO2: 98% (22 Jul 2023 05:58) (94% - 100%)    O2 Parameters below as of 22 Jul 2023 05:58  Patient On (Oxygen Delivery Method): room air            Physical Exam:  General: NAD, resting comfortably in bed  Pulmonary: Nonlabored breathing, no respiratory distress  Cardiovascular: NSR  Abdominal: soft, NT/ND  Extremities: WWP, normal strength; groin soft at IVC filter access site  Neuro: A/O x 3, CNs II-XII grossly intact, no focal deficits, normal motor/sensation  Pulses: palpable distal pulses    Lines/tubes/drains:  Cantrell:	      Vent settings:      I&O's Summary    20 Jul 2023 07:01  -  21 Jul 2023 07:00  --------------------------------------------------------  IN: 100 mL / OUT: 1250 mL / NET: -1150 mL    21 Jul 2023 07:01  -  22 Jul 2023 06:32  --------------------------------------------------------  IN: 240 mL / OUT: 950 mL / NET: -710 mL        LABS:                        9.2    7.23  )-----------( 377      ( 22 Jul 2023 06:10 )             29.8     07-21    136  |  107  |  10  ----------------------------<  85  3.7   |  20<L>  |  0.51    Ca    8.1<L>      21 Jul 2023 06:34  Phos  3.3     07-21  Mg     1.7     07-21        Urinalysis Basic - ( 21 Jul 2023 06:34 )    Color: x / Appearance: x / SG: x / pH: x  Gluc: 85 mg/dL / Ketone: x  / Bili: x / Urobili: x   Blood: x / Protein: x / Nitrite: x   Leuk Esterase: x / RBC: x / WBC x   Sq Epi: x / Non Sq Epi: x / Bacteria: x      CAPILLARY BLOOD GLUCOSE            Cultures:    RADIOLOGY & ADDITIONAL STUDIES:

## 2023-07-23 LAB
ANION GAP SERPL CALC-SCNC: 10 MMOL/L — SIGNIFICANT CHANGE UP (ref 5–17)
BUN SERPL-MCNC: 10 MG/DL — SIGNIFICANT CHANGE UP (ref 7–23)
CALCIUM SERPL-MCNC: 8.1 MG/DL — LOW (ref 8.4–10.5)
CHLORIDE SERPL-SCNC: 106 MMOL/L — SIGNIFICANT CHANGE UP (ref 96–108)
CO2 SERPL-SCNC: 22 MMOL/L — SIGNIFICANT CHANGE UP (ref 22–31)
CREAT SERPL-MCNC: 0.52 MG/DL — SIGNIFICANT CHANGE UP (ref 0.5–1.3)
EGFR: 120 ML/MIN/1.73M2 — SIGNIFICANT CHANGE UP
GLUCOSE SERPL-MCNC: 92 MG/DL — SIGNIFICANT CHANGE UP (ref 70–99)
HCT VFR BLD CALC: 26.2 % — LOW (ref 39–50)
HGB BLD-MCNC: 8.2 G/DL — LOW (ref 13–17)
MAGNESIUM SERPL-MCNC: 1.8 MG/DL — SIGNIFICANT CHANGE UP (ref 1.6–2.6)
MCHC RBC-ENTMCNC: 29.5 PG — SIGNIFICANT CHANGE UP (ref 27–34)
MCHC RBC-ENTMCNC: 31.3 GM/DL — LOW (ref 32–36)
MCV RBC AUTO: 94.2 FL — SIGNIFICANT CHANGE UP (ref 80–100)
NRBC # BLD: 0 /100 WBCS — SIGNIFICANT CHANGE UP (ref 0–0)
PHOSPHATE SERPL-MCNC: 3.2 MG/DL — SIGNIFICANT CHANGE UP (ref 2.5–4.5)
PLATELET # BLD AUTO: 352 K/UL — SIGNIFICANT CHANGE UP (ref 150–400)
POTASSIUM SERPL-MCNC: 4.1 MMOL/L — SIGNIFICANT CHANGE UP (ref 3.5–5.3)
POTASSIUM SERPL-SCNC: 4.1 MMOL/L — SIGNIFICANT CHANGE UP (ref 3.5–5.3)
RBC # BLD: 2.78 M/UL — LOW (ref 4.2–5.8)
RBC # FLD: 18.1 % — HIGH (ref 10.3–14.5)
SODIUM SERPL-SCNC: 138 MMOL/L — SIGNIFICANT CHANGE UP (ref 135–145)
WBC # BLD: 14.13 K/UL — HIGH (ref 3.8–10.5)
WBC # FLD AUTO: 14.13 K/UL — HIGH (ref 3.8–10.5)

## 2023-07-23 PROCEDURE — 71045 X-RAY EXAM CHEST 1 VIEW: CPT | Mod: 26

## 2023-07-23 RX ORDER — MAGNESIUM SULFATE 500 MG/ML
1 VIAL (ML) INJECTION ONCE
Refills: 0 | Status: COMPLETED | OUTPATIENT
Start: 2023-07-23 | End: 2023-07-23

## 2023-07-23 RX ADMIN — LACOSAMIDE 140 MILLIGRAM(S): 50 TABLET ORAL at 06:42

## 2023-07-23 RX ADMIN — HEPARIN SODIUM 5000 UNIT(S): 5000 INJECTION INTRAVENOUS; SUBCUTANEOUS at 22:13

## 2023-07-23 RX ADMIN — LACOSAMIDE 140 MILLIGRAM(S): 50 TABLET ORAL at 18:25

## 2023-07-23 RX ADMIN — HEPARIN SODIUM 5000 UNIT(S): 5000 INJECTION INTRAVENOUS; SUBCUTANEOUS at 13:24

## 2023-07-23 RX ADMIN — AMLODIPINE BESYLATE 10 MILLIGRAM(S): 2.5 TABLET ORAL at 06:42

## 2023-07-23 RX ADMIN — HEPARIN SODIUM 5000 UNIT(S): 5000 INJECTION INTRAVENOUS; SUBCUTANEOUS at 06:43

## 2023-07-23 RX ADMIN — CALAMINE AND ZINC OXIDE AND PHENOL 1 APPLICATION(S): 160; 10 LOTION TOPICAL at 18:27

## 2023-07-23 RX ADMIN — Medication 2 MILLIGRAM(S): at 18:26

## 2023-07-23 RX ADMIN — OXYCODONE HYDROCHLORIDE 5 MILLIGRAM(S): 5 TABLET ORAL at 22:17

## 2023-07-23 RX ADMIN — Medication 2 MILLIGRAM(S): at 11:27

## 2023-07-23 RX ADMIN — Medication 15 MILLILITER(S): at 11:26

## 2023-07-23 RX ADMIN — CALAMINE AND ZINC OXIDE AND PHENOL 1 APPLICATION(S): 160; 10 LOTION TOPICAL at 06:43

## 2023-07-23 RX ADMIN — Medication 2 MILLIGRAM(S): at 06:43

## 2023-07-23 RX ADMIN — Medication 100 GRAM(S): at 12:52

## 2023-07-23 RX ADMIN — OXYCODONE HYDROCHLORIDE 5 MILLIGRAM(S): 5 TABLET ORAL at 22:47

## 2023-07-23 RX ADMIN — ONDANSETRON 4 MILLIGRAM(S): 8 TABLET, FILM COATED ORAL at 00:29

## 2023-07-23 RX ADMIN — Medication 100 MILLIGRAM(S): at 06:43

## 2023-07-23 RX ADMIN — Medication 2 MILLIGRAM(S): at 00:29

## 2023-07-23 RX ADMIN — CHLORHEXIDINE GLUCONATE 1 APPLICATION(S): 213 SOLUTION TOPICAL at 06:44

## 2023-07-23 RX ADMIN — PANTOPRAZOLE SODIUM 40 MILLIGRAM(S): 20 TABLET, DELAYED RELEASE ORAL at 11:26

## 2023-07-23 RX ADMIN — Medication 81 MILLIGRAM(S): at 11:26

## 2023-07-23 NOTE — PROGRESS NOTE ADULT - ASSESSMENT
A/p: 53M w PMHx of HTN, type A aortic dissection s/p Dacron grafts, AV resuspension in 2013, CAD s/p CABG x 1 SVG to RCA (5/2013 with Dr. Medina), seizure disorder, recent admission 3/20-4/14 for replacement of transverse aortic arch, second stage TEVAR and aorto-axillary bypass, AV replacement (bio 23mm), and CABG x 1 (SVG-RCA). Pt found to have endo leak and taken to OR for TEVAR revision on 5/5. Post op course c/b Klebsiella bacteremia (5/15), resp failure requiring intubation on 5/18, Mucus plugging s/p Bronch 5/19 and PEA arrest. Post operatively pt also found to have hemorrhagic pancreatitis with venu-pancreatic abscess possibly 2* to Depakote therefore s/p IR aspiration of peripancreatic fluid collection and paracentesis on 5/22, IR venu-pancreatic abscess drainage and placement of drainage catheter on 5/24. Pt then transferred from CTICU to SICU for septic shock, and further mgmt of hemorrhagic pancreatitis on 5/25. s/p IR placement of 2 new drainage catheters and catheter exchange on 5/26. LUQ drainage 5/30. OR 5/31 for exlap washout & replacement of 3 new JPs and abthera vac with open abdomen. RTOR 6/1, no bleeding, evac of old blood, placement of feeding J-tube, failed extubation due to PNA, completed ABX course and s/p trach 6/5 and decannulation 6/28. stepped down, but returned to SICU (7/2) for respiratory distress. Emergently intubated (7/3) for aspiration pneumonia, sputum cx grew pseudomonas. Extubated on 7/9. Recent CT with B/L chornic pulmonary embolisms and pneumatosis of cecum. Started on heparin gtt - stopped 2/2 to blood per rectum. Stepped up to SICU for HD monitoring on 7/18. Transferred back to SDU on 7/19 IVC filter placed 7/19.     Patient transferred to SDU 7/22.     -pain control w/ oxy 5/10 prn  -ASA 81  -Lopressor 100 PO qd, Norvasc 10qD, Labetalol 10 prn    -Diet: minced and moist   -PPI  -SCDs/SQH  -J-tube

## 2023-07-23 NOTE — PROGRESS NOTE ADULT - SUBJECTIVE AND OBJECTIVE BOX
SUBJECTIVE:      MEDICATIONS  (STANDING):  amLODIPine   Tablet 10 milliGRAM(s) Oral daily  aspirin  chewable 81 milliGRAM(s) Oral daily  calamine/zinc oxide Lotion 1 Application(s) Topical two times a day  chlorhexidine 2% Cloths 1 Application(s) Topical <User Schedule>  heparin   Injectable 5000 Unit(s) SubCutaneous every 8 hours  lacosamide IVPB 200 milliGRAM(s) IV Intermittent every 12 hours  loperamide Liquid 2 milliGRAM(s) Enteral Tube every 6 hours  metoprolol succinate  milliGRAM(s) Oral daily  multivitamin/minerals/iron Oral Solution (CENTRUM) 15 milliLiter(s) Oral daily  pantoprazole  Injectable 40 milliGRAM(s) IV Push daily    MEDICATIONS  (PRN):  benzocaine/menthol Lozenge 1 Lozenge Oral every 6 hours PRN Sore Throat  labetalol Injectable 10 milliGRAM(s) IV Push every 6 hours PRN Systolic blood pressure > 160  ondansetron Injectable 4 milliGRAM(s) IV Push every 8 hours PRN Nausea and/or Vomiting  oxyCODONE    IR 2.5 milliGRAM(s) Oral every 6 hours PRN Moderate Pain (4 - 6)  oxyCODONE    IR 5 milliGRAM(s) Oral every 6 hours PRN Severe Pain (7 - 10)      Vital Signs Last 24 Hrs  T(C): 37.3 (23 Jul 2023 04:48), Max: 37.3 (23 Jul 2023 04:48)  T(F): 99.2 (23 Jul 2023 04:48), Max: 99.2 (23 Jul 2023 04:48)  HR: 97 (23 Jul 2023 05:45) (84 - 100)  BP: 144/91 (23 Jul 2023 04:41) (137/89 - 156/95)  BP(mean): 111 (23 Jul 2023 04:41) (108 - 120)  RR: 17 (23 Jul 2023 04:41) (17 - 18)  SpO2: 95% (23 Jul 2023 05:45) (95% - 100%)    Parameters below as of 23 Jul 2023 04:41  Patient On (Oxygen Delivery Method): BiPAP/CPAP        Physical Exam:  General: NAD, resting comfortably in bed  Pulmonary: Nonlabored breathing, no respiratory distress  Cardiovascular: NSR  Abdominal: soft, NT/ND  Extremities: WWP, normal strength  Neuro: A/O x 3, CNs II-XII grossly intact, no focal deficits    I&O's Summary    21 Jul 2023 07:01  -  22 Jul 2023 07:00  --------------------------------------------------------  IN: 240 mL / OUT: 950 mL / NET: -710 mL    22 Jul 2023 07:01  -  23 Jul 2023 06:55  --------------------------------------------------------  IN: 0 mL / OUT: 640 mL / NET: -640 mL        LABS:                        9.2    7.23  )-----------( 377      ( 22 Jul 2023 06:10 )             29.8     07-22    133<L>  |  104  |  7   ----------------------------<  81  3.7   |  21<L>  |  0.51    Ca    8.2<L>      22 Jul 2023 06:10  Phos  3.4     07-22  Mg     1.8     07-22        Urinalysis Basic - ( 22 Jul 2023 06:10 )    Color: x / Appearance: x / SG: x / pH: x  Gluc: 81 mg/dL / Ketone: x  / Bili: x / Urobili: x   Blood: x / Protein: x / Nitrite: x   Leuk Esterase: x / RBC: x / WBC x   Sq Epi: x / Non Sq Epi: x / Bacteria: x      CAPILLARY BLOOD GLUCOSE            RADIOLOGY & ADDITIONAL STUDIES:   SUBJECTIVE:  No acute events o/n. Patient seen on bedside rounds this AM. Pt denies nausea/vomiting. Denies CP, SOB.     MEDICATIONS  (STANDING):  amLODIPine   Tablet 10 milliGRAM(s) Oral daily  aspirin  chewable 81 milliGRAM(s) Oral daily  calamine/zinc oxide Lotion 1 Application(s) Topical two times a day  chlorhexidine 2% Cloths 1 Application(s) Topical <User Schedule>  heparin   Injectable 5000 Unit(s) SubCutaneous every 8 hours  lacosamide IVPB 200 milliGRAM(s) IV Intermittent every 12 hours  loperamide Liquid 2 milliGRAM(s) Enteral Tube every 6 hours  metoprolol succinate  milliGRAM(s) Oral daily  multivitamin/minerals/iron Oral Solution (CENTRUM) 15 milliLiter(s) Oral daily  pantoprazole  Injectable 40 milliGRAM(s) IV Push daily    MEDICATIONS  (PRN):  benzocaine/menthol Lozenge 1 Lozenge Oral every 6 hours PRN Sore Throat  labetalol Injectable 10 milliGRAM(s) IV Push every 6 hours PRN Systolic blood pressure > 160  ondansetron Injectable 4 milliGRAM(s) IV Push every 8 hours PRN Nausea and/or Vomiting  oxyCODONE    IR 2.5 milliGRAM(s) Oral every 6 hours PRN Moderate Pain (4 - 6)  oxyCODONE    IR 5 milliGRAM(s) Oral every 6 hours PRN Severe Pain (7 - 10)      Vital Signs Last 24 Hrs  T(C): 37.3 (23 Jul 2023 04:48), Max: 37.3 (23 Jul 2023 04:48)  T(F): 99.2 (23 Jul 2023 04:48), Max: 99.2 (23 Jul 2023 04:48)  HR: 97 (23 Jul 2023 05:45) (84 - 100)  BP: 144/91 (23 Jul 2023 04:41) (137/89 - 156/95)  BP(mean): 111 (23 Jul 2023 04:41) (108 - 120)  RR: 17 (23 Jul 2023 04:41) (17 - 18)  SpO2: 95% (23 Jul 2023 05:45) (95% - 100%)    Parameters below as of 23 Jul 2023 04:41  Patient On (Oxygen Delivery Method): BiPAP/CPAP        Physical Exam:  General: NAD, resting comfortably in bed  Pulmonary: Nonlabored breathing, no respiratory distress  Cardiovascular: NSR  Abdominal: soft, NT/ND  Extremities: WWP, normal strength  Neuro: A/O x 3, CNs II-XII grossly intact, no focal deficits    I&O's Summary    21 Jul 2023 07:01  -  22 Jul 2023 07:00  --------------------------------------------------------  IN: 240 mL / OUT: 950 mL / NET: -710 mL    22 Jul 2023 07:01  -  23 Jul 2023 06:55  --------------------------------------------------------  IN: 0 mL / OUT: 640 mL / NET: -640 mL        LABS:                        9.2    7.23  )-----------( 377      ( 22 Jul 2023 06:10 )             29.8     07-22    133<L>  |  104  |  7   ----------------------------<  81  3.7   |  21<L>  |  0.51    Ca    8.2<L>      22 Jul 2023 06:10  Phos  3.4     07-22  Mg     1.8     07-22        Urinalysis Basic - ( 22 Jul 2023 06:10 )    Color: x / Appearance: x / SG: x / pH: x  Gluc: 81 mg/dL / Ketone: x  / Bili: x / Urobili: x   Blood: x / Protein: x / Nitrite: x   Leuk Esterase: x / RBC: x / WBC x   Sq Epi: x / Non Sq Epi: x / Bacteria: x      CAPILLARY BLOOD GLUCOSE            RADIOLOGY & ADDITIONAL STUDIES:

## 2023-07-24 LAB
ANION GAP SERPL CALC-SCNC: 10 MMOL/L — SIGNIFICANT CHANGE UP (ref 5–17)
BUN SERPL-MCNC: 11 MG/DL — SIGNIFICANT CHANGE UP (ref 7–23)
CALCIUM SERPL-MCNC: 7.9 MG/DL — LOW (ref 8.4–10.5)
CHLORIDE SERPL-SCNC: 105 MMOL/L — SIGNIFICANT CHANGE UP (ref 96–108)
CO2 SERPL-SCNC: 23 MMOL/L — SIGNIFICANT CHANGE UP (ref 22–31)
CREAT SERPL-MCNC: 0.63 MG/DL — SIGNIFICANT CHANGE UP (ref 0.5–1.3)
EGFR: 113 ML/MIN/1.73M2 — SIGNIFICANT CHANGE UP
GLUCOSE SERPL-MCNC: 86 MG/DL — SIGNIFICANT CHANGE UP (ref 70–99)
HCT VFR BLD CALC: 24.8 % — LOW (ref 39–50)
HGB BLD-MCNC: 7.8 G/DL — LOW (ref 13–17)
MAGNESIUM SERPL-MCNC: 1.9 MG/DL — SIGNIFICANT CHANGE UP (ref 1.6–2.6)
MCHC RBC-ENTMCNC: 29.7 PG — SIGNIFICANT CHANGE UP (ref 27–34)
MCHC RBC-ENTMCNC: 31.5 GM/DL — LOW (ref 32–36)
MCV RBC AUTO: 94.3 FL — SIGNIFICANT CHANGE UP (ref 80–100)
NRBC # BLD: 0 /100 WBCS — SIGNIFICANT CHANGE UP (ref 0–0)
PHOSPHATE SERPL-MCNC: 3.5 MG/DL — SIGNIFICANT CHANGE UP (ref 2.5–4.5)
PLATELET # BLD AUTO: 326 K/UL — SIGNIFICANT CHANGE UP (ref 150–400)
POTASSIUM SERPL-MCNC: 3.8 MMOL/L — SIGNIFICANT CHANGE UP (ref 3.5–5.3)
POTASSIUM SERPL-SCNC: 3.8 MMOL/L — SIGNIFICANT CHANGE UP (ref 3.5–5.3)
RBC # BLD: 2.63 M/UL — LOW (ref 4.2–5.8)
RBC # FLD: 18.1 % — HIGH (ref 10.3–14.5)
SODIUM SERPL-SCNC: 138 MMOL/L — SIGNIFICANT CHANGE UP (ref 135–145)
WBC # BLD: 7.66 K/UL — SIGNIFICANT CHANGE UP (ref 3.8–10.5)
WBC # FLD AUTO: 7.66 K/UL — SIGNIFICANT CHANGE UP (ref 3.8–10.5)

## 2023-07-24 RX ORDER — POTASSIUM CHLORIDE 20 MEQ
20 PACKET (EA) ORAL ONCE
Refills: 0 | Status: COMPLETED | OUTPATIENT
Start: 2023-07-24 | End: 2023-07-24

## 2023-07-24 RX ORDER — LOPERAMIDE HCL 2 MG
2 TABLET ORAL EVERY 12 HOURS
Refills: 0 | Status: DISCONTINUED | OUTPATIENT
Start: 2023-07-24 | End: 2023-07-30

## 2023-07-24 RX ORDER — MAGNESIUM SULFATE 500 MG/ML
1 VIAL (ML) INJECTION ONCE
Refills: 0 | Status: COMPLETED | OUTPATIENT
Start: 2023-07-24 | End: 2023-07-24

## 2023-07-24 RX ADMIN — LACOSAMIDE 140 MILLIGRAM(S): 50 TABLET ORAL at 19:02

## 2023-07-24 RX ADMIN — CALAMINE AND ZINC OXIDE AND PHENOL 1 APPLICATION(S): 160; 10 LOTION TOPICAL at 06:26

## 2023-07-24 RX ADMIN — CALAMINE AND ZINC OXIDE AND PHENOL 1 APPLICATION(S): 160; 10 LOTION TOPICAL at 17:32

## 2023-07-24 RX ADMIN — LACOSAMIDE 140 MILLIGRAM(S): 50 TABLET ORAL at 06:25

## 2023-07-24 RX ADMIN — Medication 20 MILLIEQUIVALENT(S): at 08:59

## 2023-07-24 RX ADMIN — AMLODIPINE BESYLATE 10 MILLIGRAM(S): 2.5 TABLET ORAL at 05:34

## 2023-07-24 RX ADMIN — Medication 100 MILLIGRAM(S): at 05:33

## 2023-07-24 RX ADMIN — Medication 2 MILLIGRAM(S): at 01:00

## 2023-07-24 RX ADMIN — PANTOPRAZOLE SODIUM 40 MILLIGRAM(S): 20 TABLET, DELAYED RELEASE ORAL at 11:08

## 2023-07-24 RX ADMIN — Medication 15 MILLILITER(S): at 11:54

## 2023-07-24 RX ADMIN — HEPARIN SODIUM 5000 UNIT(S): 5000 INJECTION INTRAVENOUS; SUBCUTANEOUS at 22:09

## 2023-07-24 RX ADMIN — OXYCODONE HYDROCHLORIDE 5 MILLIGRAM(S): 5 TABLET ORAL at 22:09

## 2023-07-24 RX ADMIN — OXYCODONE HYDROCHLORIDE 5 MILLIGRAM(S): 5 TABLET ORAL at 05:41

## 2023-07-24 RX ADMIN — CHLORHEXIDINE GLUCONATE 1 APPLICATION(S): 213 SOLUTION TOPICAL at 06:25

## 2023-07-24 RX ADMIN — Medication 81 MILLIGRAM(S): at 11:08

## 2023-07-24 RX ADMIN — HEPARIN SODIUM 5000 UNIT(S): 5000 INJECTION INTRAVENOUS; SUBCUTANEOUS at 05:40

## 2023-07-24 RX ADMIN — Medication 2 MILLIGRAM(S): at 06:26

## 2023-07-24 RX ADMIN — OXYCODONE HYDROCHLORIDE 5 MILLIGRAM(S): 5 TABLET ORAL at 22:40

## 2023-07-24 RX ADMIN — Medication 100 GRAM(S): at 08:59

## 2023-07-24 RX ADMIN — Medication 2 MILLIGRAM(S): at 11:07

## 2023-07-24 RX ADMIN — HEPARIN SODIUM 5000 UNIT(S): 5000 INJECTION INTRAVENOUS; SUBCUTANEOUS at 13:13

## 2023-07-24 RX ADMIN — OXYCODONE HYDROCHLORIDE 5 MILLIGRAM(S): 5 TABLET ORAL at 06:11

## 2023-07-24 NOTE — PROGRESS NOTE ADULT - ASSESSMENT
A/p: 53M w PMHx of HTN, type A aortic dissection s/p Dacron grafts, AV resuspension in 2013, CAD s/p CABG x 1 SVG to RCA (5/2013 with Dr. Medina), seizure disorder, recent admission 3/20-4/14 for replacement of transverse aortic arch, second stage TEVAR and aorto-axillary bypass, AV replacement (bio 23mm), and CABG x 1 (SVG-RCA). Pt found to have endo leak and taken to OR for TEVAR revision on 5/5. Post op course c/b Klebsiella bacteremia (5/15), resp failure requiring intubation on 5/18, Mucus plugging s/p Bronch 5/19 and PEA arrest. Post operatively pt also found to have hemorrhagic pancreatitis with venu-pancreatic abscess possibly 2* to Depakote therefore s/p IR aspiration of peripancreatic fluid collection and paracentesis on 5/22, IR venu-pancreatic abscess drainage and placement of drainage catheter on 5/24. Pt then transferred from CTICU to SICU for septic shock, and further mgmt of hemorrhagic pancreatitis on 5/25. s/p IR placement of 2 new drainage catheters and catheter exchange on 5/26. LUQ drainage 5/30. OR 5/31 for exlap washout & replacement of 3 new JPs and abthera vac with open abdomen. RTOR 6/1, no bleeding, evac of old blood, placement of feeding J-tube, failed extubation due to PNA, completed ABX course and s/p trach 6/5 and decannulation 6/28. stepped down, but returned to SICU (7/2) for respiratory distress. Emergently intubated (7/3) for aspiration pneumonia, sputum cx grew pseudomonas. Extubated on 7/9. Recent CT with B/L chornic pulmonary embolisms and pneumatosis of cecum. Started on heparin gtt - stopped 2/2 to blood per rectum. Stepped up to SICU for HD monitoring on 7/18. Transferred back to SDU on 7/19 IVC filter placed on 7/19.     soft diet  ASA/SQH  Pain/nausea prn  lab holiday  plan for d/c to rehab

## 2023-07-24 NOTE — PROGRESS NOTE ADULT - SUBJECTIVE AND OBJECTIVE BOX
SUBJECTIVE: Pt seen and examined at bedside with chief. Pain well controlled. Tolerating diet without N/V. Admits to BF. Denies fever, chills, cp, sob, difficulty breathing.    MEDICATIONS  (STANDING):  amLODIPine   Tablet 10 milliGRAM(s) Oral daily  aspirin  chewable 81 milliGRAM(s) Oral daily  calamine/zinc oxide Lotion 1 Application(s) Topical two times a day  chlorhexidine 2% Cloths 1 Application(s) Topical <User Schedule>  heparin   Injectable 5000 Unit(s) SubCutaneous every 8 hours  lacosamide IVPB 200 milliGRAM(s) IV Intermittent every 12 hours  loperamide Liquid 2 milliGRAM(s) Enteral Tube every 6 hours  magnesium sulfate  IVPB 1 Gram(s) IV Intermittent once  metoprolol succinate  milliGRAM(s) Oral daily  multivitamin/minerals/iron Oral Solution (CENTRUM) 15 milliLiter(s) Oral daily  pantoprazole  Injectable 40 milliGRAM(s) IV Push daily  potassium chloride   Powder 20 milliEquivalent(s) Oral once    MEDICATIONS  (PRN):  benzocaine/menthol Lozenge 1 Lozenge Oral every 6 hours PRN Sore Throat  labetalol Injectable 10 milliGRAM(s) IV Push every 6 hours PRN Systolic blood pressure > 160  ondansetron Injectable 4 milliGRAM(s) IV Push every 8 hours PRN Nausea and/or Vomiting  oxyCODONE    IR 2.5 milliGRAM(s) Oral every 6 hours PRN Moderate Pain (4 - 6)  oxyCODONE    IR 5 milliGRAM(s) Oral every 6 hours PRN Severe Pain (7 - 10)      Vital Signs Last 24 Hrs  T(C): 36.6 (24 Jul 2023 05:01), Max: 37.3 (23 Jul 2023 18:11)  T(F): 97.9 (24 Jul 2023 05:01), Max: 99.1 (23 Jul 2023 18:11)  HR: 83 (24 Jul 2023 05:52) (80 - 96)  BP: 133/83 (24 Jul 2023 04:25) (126/79 - 135/88)  BP(mean): 103 (24 Jul 2023 04:25) (97 - 106)  RR: 18 (24 Jul 2023 04:25) (18 - 18)  SpO2: 97% (24 Jul 2023 05:52) (93% - 100%)    Parameters below as of 24 Jul 2023 05:52  Patient On (Oxygen Delivery Method): room air        PHYSICAL EXAM:      Constitutional: A&Ox3    Respiratory: non labored breathing, no respiratory distress    Cardiovascular: NSR, RRR    Gastrointestinal: Soft ND, NT                 Incision: Healing well     Genitourinary: Voiding     Extremities: (-) edema                  I&O's Detail    23 Jul 2023 07:01  -  24 Jul 2023 07:00  --------------------------------------------------------  IN:    IV PiggyBack: 100 mL    IV PiggyBack: 50 mL  Total IN: 150 mL    OUT:    Voided (mL): 675 mL  Total OUT: 675 mL    Total NET: -525 mL          LABS:                        7.8    7.66  )-----------( 326      ( 24 Jul 2023 05:30 )             24.8     07-24    138  |  105  |  11  ----------------------------<  86  3.8   |  23  |  0.63    Ca    7.9<L>      24 Jul 2023 05:30  Phos  3.5     07-24  Mg     1.9     07-24        Urinalysis Basic - ( 24 Jul 2023 05:30 )    Color: x / Appearance: x / SG: x / pH: x  Gluc: 86 mg/dL / Ketone: x  / Bili: x / Urobili: x   Blood: x / Protein: x / Nitrite: x   Leuk Esterase: x / RBC: x / WBC x   Sq Epi: x / Non Sq Epi: x / Bacteria: x        RADIOLOGY & ADDITIONAL STUDIES:

## 2023-07-24 NOTE — CHART NOTE - NSCHARTNOTEFT_GEN_A_CORE
Admitting Diagnosis:   Patient is a 53y old  Male who presents with a chief complaint of endoleak, abdominal pain (24 Jul 2023 08:18)      PAST MEDICAL & SURGICAL HISTORY:  HTN (hypertension)      Aortic dissection      CAD (coronary artery disease)      Seizure disorder      Seizure disorder      Hypertension      Status post endovascular aneurysm repair (EVAR)      S/P aortic bifurcation bypass graft      S/P CABG x 1          Current Nutrition Order: Minced and moist + 2 Ensure Enlive daily [700kcals, 40g protein]    PO Intake: Good (%) [   ]  Fair (50-75%) [ x  ] Poor (<25%) [   ]- per pt    GI Issues:     Pain:    Skin Integrity:    Labs:   07-24    138  |  105  |  11  ----------------------------<  86  3.8   |  23  |  0.63    Ca    7.9<L>      24 Jul 2023 05:30  Phos  3.5     07-24  Mg     1.9     07-24      CAPILLARY BLOOD GLUCOSE          Medications:  MEDICATIONS  (STANDING):  amLODIPine   Tablet 10 milliGRAM(s) Oral daily  aspirin  chewable 81 milliGRAM(s) Oral daily  calamine/zinc oxide Lotion 1 Application(s) Topical two times a day  chlorhexidine 2% Cloths 1 Application(s) Topical <User Schedule>  heparin   Injectable 5000 Unit(s) SubCutaneous every 8 hours  lacosamide IVPB 200 milliGRAM(s) IV Intermittent every 12 hours  loperamide 2 milliGRAM(s) Oral every 12 hours  metoprolol succinate  milliGRAM(s) Oral daily  multivitamin/minerals/iron Oral Solution (CENTRUM) 15 milliLiter(s) Oral daily  pantoprazole  Injectable 40 milliGRAM(s) IV Push daily    MEDICATIONS  (PRN):  benzocaine/menthol Lozenge 1 Lozenge Oral every 6 hours PRN Sore Throat  labetalol Injectable 10 milliGRAM(s) IV Push every 6 hours PRN Systolic blood pressure > 160  ondansetron Injectable 4 milliGRAM(s) IV Push every 8 hours PRN Nausea and/or Vomiting  oxyCODONE    IR 2.5 milliGRAM(s) Oral every 6 hours PRN Moderate Pain (4 - 6)  oxyCODONE    IR 5 milliGRAM(s) Oral every 6 hours PRN Severe Pain (7 - 10)      Weight:  Daily     Daily     Weight Change:     Estimated energy needs:     Subjective:     Previous Nutrition Diagnosis:    Active [   ]  Resolved [   ]    If resolved, new PES:     Goal:    Recommendations:    Education:     Risk Level: High [   ] Moderate [   ] Low [   ] Admitting Diagnosis:   Patient is a 53y old  Male who presents with a chief complaint of endoleak, abdominal pain (24 Jul 2023 08:18)      PAST MEDICAL & SURGICAL HISTORY:  HTN (hypertension)      Aortic dissection      CAD (coronary artery disease)      Seizure disorder      Seizure disorder      Hypertension      Status post endovascular aneurysm repair (EVAR)      S/P aortic bifurcation bypass graft      S/P CABG x 1          Current Nutrition Order: Minced and moist + 2 Ensure Enlive daily [700kcals, 40g protein]    PO Intake: Good (%) [   ]  Fair (50-75%) [ x  ] Poor (<25%) [   ]- per pt    GI Issues:     Pain:    Skin Integrity:    Labs:   07-24    138  |  105  |  11  ----------------------------<  86  3.8   |  23  |  0.63    Ca    7.9<L>      24 Jul 2023 05:30  Phos  3.5     07-24  Mg     1.9     07-24      CAPILLARY BLOOD GLUCOSE          Medications:  MEDICATIONS  (STANDING):  amLODIPine   Tablet 10 milliGRAM(s) Oral daily  aspirin  chewable 81 milliGRAM(s) Oral daily  calamine/zinc oxide Lotion 1 Application(s) Topical two times a day  chlorhexidine 2% Cloths 1 Application(s) Topical <User Schedule>  heparin   Injectable 5000 Unit(s) SubCutaneous every 8 hours  lacosamide IVPB 200 milliGRAM(s) IV Intermittent every 12 hours  loperamide 2 milliGRAM(s) Oral every 12 hours  metoprolol succinate  milliGRAM(s) Oral daily  multivitamin/minerals/iron Oral Solution (CENTRUM) 15 milliLiter(s) Oral daily  pantoprazole  Injectable 40 milliGRAM(s) IV Push daily    MEDICATIONS  (PRN):  benzocaine/menthol Lozenge 1 Lozenge Oral every 6 hours PRN Sore Throat  labetalol Injectable 10 milliGRAM(s) IV Push every 6 hours PRN Systolic blood pressure > 160  ondansetron Injectable 4 milliGRAM(s) IV Push every 8 hours PRN Nausea and/or Vomiting  oxyCODONE    IR 2.5 milliGRAM(s) Oral every 6 hours PRN Moderate Pain (4 - 6)  oxyCODONE    IR 5 milliGRAM(s) Oral every 6 hours PRN Severe Pain (7 - 10)      Weight: 90.9kg [7 June 2023]  Daily Height in cm: 182.9 (19 Jul 2023 10:43)    Daily 83.3kg [4 July 2023]    Weight Change: wt of 83.3kg July 4 likely error vs. fluid shifts? Recommend weekly weights for trending & ensure adequate nutrition    Estimated energy needs:   Ideal body weight (80.9kg) used for calculations as pt >100% of IBW, BMI <30 per [facility] Standards of Care. Needs estimated for age and adjusted for current clinical status     Calories: 2022.5-2427 kcals based on 25-30 kcals/kg  Protein: 113.3-129.4g protein based on 1.4-1.6g protein/kg  Fluids: 2022.5-2427ml based on 25-30ml/kg    Subjective:   53M w PMHx of HTN, type A aortic dissection s/p Dacron grafts, AV resuspension in 2013, CAD s/p CABG x 1 SVG to RCA (5/2013 with Dr. Medina), seizure disorder, recent admission 3/20-4/14 for replacement of transverse aortic arch, second stage TEVAR and aorto-axillary bypass, AV replacement (bio 23mm), and CABG x 1 (SVG-RCA). Pt found to have endo leak and taken to OR for TEVAR revision on 5/5. Post op course c/b Klebsiella bacteremia (5/15), resp failure requiring intubation on 5/18, Mucus plugging s/p Bronch 5/19 and PEA arrest. Post operatively pt also found to have hemorrhagic pancreatitis with venu-pancreatic abscess possibly 2* to Depakote therefore s/p IR aspiration of peripancreatic fluid collection and paracentesis on 5/22, IR venu-pancreatic abscess drainage and placement of drainage catheter on 5/24. Pt then transferred from CTICU to SICU for septic shock, and further mgmt of hemorrhagic pancreatitis on 5/25. s/p IR placement of 2 new drainage catheters and catheter exchange on 5/26. LUQ drainage 5/30. OR 5/31 for exlap washout & replacement of 3 new JPs and abthera vac with open abdomen. RTOR 6/1, no bleeding, evac of old blood, placement of feeding J-tube, failed extubation due to PNA, completed ABX course and s/p trach 6/5 and decannulation 6/28. stepped down, but returned to SICU (7/2) for respiratory distress. Emergently intubated (7/3) for aspiration pneumonia, sputum cx grew pseudomonas. Extubated on 7/9. FEES 7/12: minced moist with thin. 7/14: SDU from SICU       Chart reviewed. Pt seen on 8LA with family member at bedside.    Previous Nutrition Diagnosis:    Active [   ]  Resolved [   ]    If resolved, new PES:     Goal:    Recommendations:    Education:     Risk Level: High [   ] Moderate [   ] Low [   ] Admitting Diagnosis:   Patient is a 53y old  Male who presents with a chief complaint of endoleak, abdominal pain (24 Jul 2023 08:18)      PAST MEDICAL & SURGICAL HISTORY:  HTN (hypertension)      Aortic dissection      CAD (coronary artery disease)      Seizure disorder      Seizure disorder      Hypertension      Status post endovascular aneurysm repair (EVAR)      S/P aortic bifurcation bypass graft      S/P CABG x 1          Current Nutrition Order: Minced and moist + 2 Ensure Enlive daily [700kcals, 40g protein]    PO Intake: Good (%) [   ]  Fair (50-75%) [ x  ] Poor (<25%) [   ]- per pt    GI Issues: pt denies s/s GI distress, 1 BM noted today    Pain: denies pain/discomfort    Skin Integrity: sx incision to abdomen, stage II PU to L shoulder & sacrum, J tube    Labs:   07-24    138  |  105  |  11  ----------------------------<  86  3.8   |  23  |  0.63    Ca    7.9<L>      24 Jul 2023 05:30  Phos  3.5     07-24  Mg     1.9     07-24      CAPILLARY BLOOD GLUCOSE          Medications:  MEDICATIONS  (STANDING):  amLODIPine   Tablet 10 milliGRAM(s) Oral daily  aspirin  chewable 81 milliGRAM(s) Oral daily  calamine/zinc oxide Lotion 1 Application(s) Topical two times a day  chlorhexidine 2% Cloths 1 Application(s) Topical <User Schedule>  heparin   Injectable 5000 Unit(s) SubCutaneous every 8 hours  lacosamide IVPB 200 milliGRAM(s) IV Intermittent every 12 hours  loperamide 2 milliGRAM(s) Oral every 12 hours  metoprolol succinate  milliGRAM(s) Oral daily  multivitamin/minerals/iron Oral Solution (CENTRUM) 15 milliLiter(s) Oral daily  pantoprazole  Injectable 40 milliGRAM(s) IV Push daily    MEDICATIONS  (PRN):  benzocaine/menthol Lozenge 1 Lozenge Oral every 6 hours PRN Sore Throat  labetalol Injectable 10 milliGRAM(s) IV Push every 6 hours PRN Systolic blood pressure > 160  ondansetron Injectable 4 milliGRAM(s) IV Push every 8 hours PRN Nausea and/or Vomiting  oxyCODONE    IR 2.5 milliGRAM(s) Oral every 6 hours PRN Moderate Pain (4 - 6)  oxyCODONE    IR 5 milliGRAM(s) Oral every 6 hours PRN Severe Pain (7 - 10)      Weight: 90.9kg [7 June 2023]  Daily Height in cm: 182.9 (19 Jul 2023 10:43)    Daily 83.3kg [4 July 2023]    Weight Change: wt of 83.3kg July 4 likely error vs. fluid shifts? Recommend weekly weights for trending & ensure adequate nutrition    Estimated energy needs:   Ideal body weight (80.9kg) used for calculations as pt >100% of IBW, BMI <30 per [facility] Standards of Care. Needs estimated for age and adjusted for current clinical status     Calories: 2022.5-2427 kcals based on 25-30 kcals/kg  Protein: 113.3-129.4g protein based on 1.4-1.6g protein/kg  Fluids: 2022.5-2427ml based on 25-30ml/kg    Subjective:   53M w PMHx of HTN, type A aortic dissection s/p Dacron grafts, AV resuspension in 2013, CAD s/p CABG x 1 SVG to RCA (5/2013 with Dr. Medina), seizure disorder, recent admission 3/20-4/14 for replacement of transverse aortic arch, second stage TEVAR and aorto-axillary bypass, AV replacement (bio 23mm), and CABG x 1 (SVG-RCA). Pt found to have endo leak and taken to OR for TEVAR revision on 5/5. Post op course c/b Klebsiella bacteremia (5/15), resp failure requiring intubation on 5/18, Mucus plugging s/p Bronch 5/19 and PEA arrest. Post operatively pt also found to have hemorrhagic pancreatitis with venu-pancreatic abscess possibly 2* to Depakote therefore s/p IR aspiration of peripancreatic fluid collection and paracentesis on 5/22, IR venu-pancreatic abscess drainage and placement of drainage catheter on 5/24. Pt then transferred from CTICU to SICU for septic shock, and further mgmt of hemorrhagic pancreatitis on 5/25. s/p IR placement of 2 new drainage catheters and catheter exchange on 5/26. LUQ drainage 5/30. OR 5/31 for exlap washout & replacement of 3 new JPs and abthera vac with open abdomen. RTOR 6/1, no bleeding, evac of old blood, placement of feeding J-tube, failed extubation due to PNA, completed ABX course and s/p trach 6/5 and decannulation 6/28. stepped down, but returned to SICU (7/2) for respiratory distress. Emergently intubated (7/3) for aspiration pneumonia, sputum cx grew pseudomonas. Extubated on 7/9. FEES 7/12: minced moist with thin. 7/14: SDU from SICU       Chart reviewed. Pt seen on 8LA with family member at bedside. Pt currently on PO minced and moist diet, reported fair PO intake & appetite, drinks Ensure supplements [provides additional 700kcals, 40g protein]. Pt denies chew/swallow difficulty, denies n/v/c/d/abd pain, no s/s GI distress noted [on immodium & zofran]. Several skin impairments noted, consider Peter BID to promote wound healing. Nutrition related labs reviewed. No other nutritional concerns at this time. RDN will continue to monitor, reassess, and intervene as appropriate.     Previous Nutrition Diagnosis:  Increased nutrients RT increased demands AEB clinical course, Pressure ulcers    Active [ X  ]  Resolved [   ]    If resolved, new PES:     Goal:  Pt will meet at least 75% of protein & energy needs via most appropriate route for nutrition     Recommendations:  1. c/w current diet order  - honor food preferences as able  - encouragement at meal times for adequate PO intake  - monitor for s/s GI distress, consider low fat, low fiber diet  2. c/w Ensure Enlive BID [700 kcals, 40g protein]  3. Consider 1pk Peter BID; provides 160 kcals, 14g arginine, 14g glutamine, 5g collagen   - to promote wound healing  4. GI  - antiemetic/prokinetic agent prn to promote tolerance  - imodium as needed  5. Monitor chemistry, GI function, skin integrity  6. To monitor clinical course and adjust recommendations as needed    Education: adequate kcals & protein intake    Risk Level: High [ X ] Moderate [  ] Low [   ]

## 2023-07-25 RX ADMIN — AMLODIPINE BESYLATE 10 MILLIGRAM(S): 2.5 TABLET ORAL at 06:41

## 2023-07-25 RX ADMIN — OXYCODONE HYDROCHLORIDE 5 MILLIGRAM(S): 5 TABLET ORAL at 17:50

## 2023-07-25 RX ADMIN — Medication 2 MILLIGRAM(S): at 17:22

## 2023-07-25 RX ADMIN — PANTOPRAZOLE SODIUM 40 MILLIGRAM(S): 20 TABLET, DELAYED RELEASE ORAL at 11:34

## 2023-07-25 RX ADMIN — HEPARIN SODIUM 5000 UNIT(S): 5000 INJECTION INTRAVENOUS; SUBCUTANEOUS at 14:04

## 2023-07-25 RX ADMIN — CHLORHEXIDINE GLUCONATE 1 APPLICATION(S): 213 SOLUTION TOPICAL at 07:29

## 2023-07-25 RX ADMIN — CALAMINE AND ZINC OXIDE AND PHENOL 1 APPLICATION(S): 160; 10 LOTION TOPICAL at 17:23

## 2023-07-25 RX ADMIN — Medication 15 MILLILITER(S): at 12:05

## 2023-07-25 RX ADMIN — LACOSAMIDE 140 MILLIGRAM(S): 50 TABLET ORAL at 19:11

## 2023-07-25 RX ADMIN — LACOSAMIDE 140 MILLIGRAM(S): 50 TABLET ORAL at 06:41

## 2023-07-25 RX ADMIN — CALAMINE AND ZINC OXIDE AND PHENOL 1 APPLICATION(S): 160; 10 LOTION TOPICAL at 06:42

## 2023-07-25 RX ADMIN — HEPARIN SODIUM 5000 UNIT(S): 5000 INJECTION INTRAVENOUS; SUBCUTANEOUS at 06:41

## 2023-07-25 RX ADMIN — OXYCODONE HYDROCHLORIDE 5 MILLIGRAM(S): 5 TABLET ORAL at 18:00

## 2023-07-25 RX ADMIN — HEPARIN SODIUM 5000 UNIT(S): 5000 INJECTION INTRAVENOUS; SUBCUTANEOUS at 22:29

## 2023-07-25 RX ADMIN — Medication 81 MILLIGRAM(S): at 11:34

## 2023-07-25 RX ADMIN — Medication 100 MILLIGRAM(S): at 06:41

## 2023-07-25 NOTE — PROGRESS NOTE ADULT - SUBJECTIVE AND OBJECTIVE BOX
Overnight events: No acute overnight events.           SUBJECTIVE: Patient seen at bedside with chief resident. Patient denies any nausea, vomiting, or pain. He does endorse one BM and flatus.       MEDICATIONS  (STANDING):  amLODIPine   Tablet 10 milliGRAM(s) Oral daily  aspirin  chewable 81 milliGRAM(s) Oral daily  calamine/zinc oxide Lotion 1 Application(s) Topical two times a day  chlorhexidine 2% Cloths 1 Application(s) Topical <User Schedule>  heparin   Injectable 5000 Unit(s) SubCutaneous every 8 hours  lacosamide IVPB 200 milliGRAM(s) IV Intermittent every 12 hours  loperamide 2 milliGRAM(s) Oral every 12 hours  metoprolol succinate  milliGRAM(s) Oral daily  multivitamin/minerals/iron Oral Solution (CENTRUM) 15 milliLiter(s) Oral daily  pantoprazole  Injectable 40 milliGRAM(s) IV Push daily    MEDICATIONS  (PRN):  benzocaine/menthol Lozenge 1 Lozenge Oral every 6 hours PRN Sore Throat  labetalol Injectable 10 milliGRAM(s) IV Push every 6 hours PRN Systolic blood pressure > 160  ondansetron Injectable 4 milliGRAM(s) IV Push every 8 hours PRN Nausea and/or Vomiting  oxyCODONE    IR 2.5 milliGRAM(s) Oral every 6 hours PRN Moderate Pain (4 - 6)  oxyCODONE    IR 5 milliGRAM(s) Oral every 6 hours PRN Severe Pain (7 - 10)      Vital Signs Last 24 Hrs  T(C): 36.9 (25 Jul 2023 05:12), Max: 37.1 (24 Jul 2023 14:14)  T(F): 98.5 (25 Jul 2023 05:12), Max: 98.7 (24 Jul 2023 14:14)  HR: 80 (25 Jul 2023 05:49) (76 - 96)  BP: 131/85 (25 Jul 2023 05:15) (107/71 - 136/82)  BP(mean): 103 (25 Jul 2023 05:15) (85 - 104)  RR: 18 (25 Jul 2023 05:15) (13 - 18)  SpO2: 100% (25 Jul 2023 05:49) (93% - 100%)    Parameters below as of 25 Jul 2023 05:15  Patient On (Oxygen Delivery Method): BiPAP/CPAP    O2 Concentration (%): 30    Physical Exam:  General: NAD, resting comfortably in bed  Neck: white cream on patient's right side of neck   Pulmonary: Nonlabored breathing, no respiratory distress  Cardiovascular: NSR  Abdominal: soft, NT/ND  Extremities: WWP, normal strength  Neuro: A/O x 3, CNs II-XII grossly intact, no focal deficits, normal motor/sensation  Pulses: palpable distal pulses    I&O's Summary    24 Jul 2023 07:01  -  25 Jul 2023 07:00  --------------------------------------------------------  IN: 650 mL / OUT: 375 mL / NET: 275 mL        LABS:                        7.8    7.66  )-----------( 326      ( 24 Jul 2023 05:30 )             24.8     07-24    138  |  105  |  11  ----------------------------<  86  3.8   |  23  |  0.63    Ca    7.9<L>      24 Jul 2023 05:30  Phos  3.5     07-24  Mg     1.9     07-24        Urinalysis Basic - ( 24 Jul 2023 05:30 )    Color: x / Appearance: x / SG: x / pH: x  Gluc: 86 mg/dL / Ketone: x  / Bili: x / Urobili: x   Blood: x / Protein: x / Nitrite: x   Leuk Esterase: x / RBC: x / WBC x   Sq Epi: x / Non Sq Epi: x / Bacteria: x      CAPILLARY BLOOD GLUCOSE            RADIOLOGY & ADDITIONAL STUDIES:   Detail Level: Zone Detail Level: Detailed

## 2023-07-25 NOTE — PROGRESS NOTE ADULT - ASSESSMENT
53M w PMHx of HTN, type A aortic dissection s/p Dacron grafts, AV resuspension in 2013, CAD s/p CABG x 1 SVG to RCA (5/2013 with Dr. Medina), seizure disorder, recent admission 3/20-4/14 for replacement of transverse aortic arch, second stage TEVAR and aorto-axillary bypass, AV replacement (bio 23mm), and CABG x 1 (SVG-RCA). Pt found to have endo leak and taken to OR for TEVAR revision on 5/5. Post op course c/b Klebsiella bacteremia (5/15), resp failure requiring intubation on 5/18, Mucus plugging s/p Bronch 5/19 and PEA arrest. Post operatively pt also found to have hemorrhagic pancreatitis with venu-pancreatic abscess possibly 2* to Depakote therefore s/p IR aspiration of peripancreatic fluid collection and paracentesis on 5/22, IR venu-pancreatic abscess drainage and placement of drainage catheter on 5/24. Pt then transferred from CTICU to SICU for septic shock, and further mgmt of hemorrhagic pancreatitis on 5/25. s/p IR placement of 2 new drainage catheters and catheter exchange on 5/26. LUQ drainage 5/30. OR 5/31 for exlap washout & replacement of 3 new JPs and abthera vac with open abdomen. RTOR 6/1, no bleeding, evac of old blood, placement of feeding J-tube, failed extubation due to PNA, completed ABX course and s/p trach 6/5 and decannulation 6/28. stepped down, but returned to SICU (7/2) for respiratory distress. Emergently intubated (7/3) for aspiration pneumonia, sputum cx grew pseudomonas. Extubated on 7/9. Recent CT with B/L chornic pulmonary embolisms and pneumatosis of cecum. Started on heparin gtt - stopped 2/2 to blood per rectum. Stepped up to SICU for HD monitoring on 7/18. Transferred back to SDU on 7/19 IVC filter placed 7/19. Patient transferred to SDU 7/22.     -pain control w/ oxy 5/10 prn  -ASA 81  -Diet: minced and moist   -PPI  -SCDs/SQH  plan for d/c to rehab

## 2023-07-26 LAB
CULTURE RESULTS: SIGNIFICANT CHANGE UP
SPECIMEN SOURCE: SIGNIFICANT CHANGE UP

## 2023-07-26 RX ADMIN — Medication 100 MILLIGRAM(S): at 06:56

## 2023-07-26 RX ADMIN — OXYCODONE HYDROCHLORIDE 2.5 MILLIGRAM(S): 5 TABLET ORAL at 23:16

## 2023-07-26 RX ADMIN — OXYCODONE HYDROCHLORIDE 5 MILLIGRAM(S): 5 TABLET ORAL at 19:51

## 2023-07-26 RX ADMIN — CALAMINE AND ZINC OXIDE AND PHENOL 1 APPLICATION(S): 160; 10 LOTION TOPICAL at 18:22

## 2023-07-26 RX ADMIN — Medication 2 MILLIGRAM(S): at 06:56

## 2023-07-26 RX ADMIN — HEPARIN SODIUM 5000 UNIT(S): 5000 INJECTION INTRAVENOUS; SUBCUTANEOUS at 06:56

## 2023-07-26 RX ADMIN — OXYCODONE HYDROCHLORIDE 5 MILLIGRAM(S): 5 TABLET ORAL at 18:22

## 2023-07-26 RX ADMIN — OXYCODONE HYDROCHLORIDE 5 MILLIGRAM(S): 5 TABLET ORAL at 00:56

## 2023-07-26 RX ADMIN — HEPARIN SODIUM 5000 UNIT(S): 5000 INJECTION INTRAVENOUS; SUBCUTANEOUS at 22:06

## 2023-07-26 RX ADMIN — OXYCODONE HYDROCHLORIDE 2.5 MILLIGRAM(S): 5 TABLET ORAL at 22:17

## 2023-07-26 RX ADMIN — PANTOPRAZOLE SODIUM 40 MILLIGRAM(S): 20 TABLET, DELAYED RELEASE ORAL at 12:05

## 2023-07-26 RX ADMIN — CHLORHEXIDINE GLUCONATE 1 APPLICATION(S): 213 SOLUTION TOPICAL at 06:55

## 2023-07-26 RX ADMIN — LACOSAMIDE 140 MILLIGRAM(S): 50 TABLET ORAL at 06:55

## 2023-07-26 RX ADMIN — AMLODIPINE BESYLATE 10 MILLIGRAM(S): 2.5 TABLET ORAL at 06:56

## 2023-07-26 RX ADMIN — HEPARIN SODIUM 5000 UNIT(S): 5000 INJECTION INTRAVENOUS; SUBCUTANEOUS at 14:17

## 2023-07-26 RX ADMIN — OXYCODONE HYDROCHLORIDE 5 MILLIGRAM(S): 5 TABLET ORAL at 01:44

## 2023-07-26 RX ADMIN — Medication 2 MILLIGRAM(S): at 18:18

## 2023-07-26 RX ADMIN — LACOSAMIDE 140 MILLIGRAM(S): 50 TABLET ORAL at 18:19

## 2023-07-26 RX ADMIN — CALAMINE AND ZINC OXIDE AND PHENOL 1 APPLICATION(S): 160; 10 LOTION TOPICAL at 06:55

## 2023-07-26 RX ADMIN — Medication 15 MILLILITER(S): at 12:42

## 2023-07-26 RX ADMIN — Medication 81 MILLIGRAM(S): at 12:04

## 2023-07-26 NOTE — PROGRESS NOTE ADULT - ASSESSMENT
A/p: 53M w PMHx of HTN, type A aortic dissection s/p Dacron grafts, AV resuspension in 2013, CAD s/p CABG x 1 SVG to RCA (5/2013 with Dr. Medina), seizure disorder, recent admission 3/20-4/14 for replacement of transverse aortic arch, second stage TEVAR and aorto-axillary bypass, AV replacement (bio 23mm), and CABG x 1 (SVG-RCA). Pt found to have endo leak and taken to OR for TEVAR revision on 5/5. Post op course c/b Klebsiella bacteremia (5/15), resp failure requiring intubation on 5/18, Mucus plugging s/p Bronch 5/19 and PEA arrest. Post operatively pt also found to have hemorrhagic pancreatitis with venu-pancreatic abscess possibly 2* to Depakote therefore s/p IR aspiration of peripancreatic fluid collection and paracentesis on 5/22, IR venu-pancreatic abscess drainage and placement of drainage catheter on 5/24. Pt then transferred from CTICU to SICU for septic shock, and further mgmt of hemorrhagic pancreatitis on 5/25. s/p IR placement of 2 new drainage catheters and catheter exchange on 5/26. LUQ drainage 5/30. OR 5/31 for exlap washout & replacement of 3 new JPs and abthera vac with open abdomen. RTOR 6/1, no bleeding, evac of old blood, placement of feeding J-tube, failed extubation due to PNA, completed ABX course and s/p trach 6/5 and decannulation 6/28. stepped down, but returned to SICU (7/2) for respiratory distress. Emergently intubated (7/3) for aspiration pneumonia, sputum cx grew pseudomonas. Extubated on 7/9. Recent CT with B/L chornic pulmonary embolisms and pneumatosis of cecum. Started on heparin gtt - stopped 2/2 to blood per rectum. Stepped up to SICU for HD monitoring on 7/18. Transferred back to SDU on 7/19 IVC filter placed on 7/19.     soft diet  ASA/SQH/ SCD  Pain/nausea control prn  lab holiday  plan for d/c to rehab

## 2023-07-26 NOTE — PROGRESS NOTE ADULT - SUBJECTIVE AND OBJECTIVE BOX
SUBJECTIVE: Pt seen and examined at bedside with chief. Pt denies any complaints. Pain well controlled. Tolerating diet without N/V. Admits to BF     MEDICATIONS  (STANDING):  amLODIPine   Tablet 10 milliGRAM(s) Oral daily  aspirin  chewable 81 milliGRAM(s) Oral daily  calamine/zinc oxide Lotion 1 Application(s) Topical two times a day  chlorhexidine 2% Cloths 1 Application(s) Topical <User Schedule>  heparin   Injectable 5000 Unit(s) SubCutaneous every 8 hours  lacosamide IVPB 200 milliGRAM(s) IV Intermittent every 12 hours  loperamide 2 milliGRAM(s) Oral every 12 hours  metoprolol succinate  milliGRAM(s) Oral daily  multivitamin/minerals/iron Oral Solution (CENTRUM) 15 milliLiter(s) Oral daily  pantoprazole  Injectable 40 milliGRAM(s) IV Push daily    MEDICATIONS  (PRN):  benzocaine/menthol Lozenge 1 Lozenge Oral every 6 hours PRN Sore Throat  labetalol Injectable 10 milliGRAM(s) IV Push every 6 hours PRN Systolic blood pressure > 160  ondansetron Injectable 4 milliGRAM(s) IV Push every 8 hours PRN Nausea and/or Vomiting  oxyCODONE    IR 5 milliGRAM(s) Oral every 6 hours PRN Severe Pain (7 - 10)  oxyCODONE    IR 2.5 milliGRAM(s) Oral every 6 hours PRN Moderate Pain (4 - 6)      Vital Signs Last 24 Hrs  T(C): 36.7 (26 Jul 2023 05:06), Max: 37.6 (25 Jul 2023 18:09)  T(F): 98 (26 Jul 2023 05:06), Max: 99.7 (25 Jul 2023 18:09)  HR: 84 (26 Jul 2023 04:18) (76 - 94)  BP: 137/83 (26 Jul 2023 04:18) (123/80 - 137/88)  BP(mean): 105 (26 Jul 2023 04:18) (96 - 107)  RR: 18 (26 Jul 2023 04:18) (17 - 18)  SpO2: 95% (26 Jul 2023 04:22) (95% - 100%)    Parameters below as of 26 Jul 2023 04:18  Patient On (Oxygen Delivery Method): room air        PHYSICAL EXAM:      Constitutional: A&Ox3    Respiratory: non labored breathing, no respiratory distress    Cardiovascular: NSR, RRR    Gastrointestinal: Soft ND, NT                 Incision: Healing well    Genitourinary: Voiding     Extremities: (-) edema                  I&O's Detail    25 Jul 2023 07:01  -  26 Jul 2023 07:00  --------------------------------------------------------  IN:    IV PiggyBack: 50 mL    Oral Fluid: 300 mL  Total IN: 350 mL    OUT:    Voided (mL): 425 mL  Total OUT: 425 mL    Total NET: -75 mL          LABS:                RADIOLOGY & ADDITIONAL STUDIES:

## 2023-07-27 LAB
APPEARANCE UR: CLEAR — SIGNIFICANT CHANGE UP
BACTERIA # UR AUTO: SIGNIFICANT CHANGE UP /HPF
BILIRUB UR-MCNC: NEGATIVE — SIGNIFICANT CHANGE UP
COLOR SPEC: YELLOW — SIGNIFICANT CHANGE UP
DIFF PNL FLD: ABNORMAL
EPI CELLS # UR: SIGNIFICANT CHANGE UP /HPF (ref 0–5)
GLUCOSE UR QL: NEGATIVE — SIGNIFICANT CHANGE UP
KETONES UR-MCNC: NEGATIVE — SIGNIFICANT CHANGE UP
LEUKOCYTE ESTERASE UR-ACNC: NEGATIVE — SIGNIFICANT CHANGE UP
NITRITE UR-MCNC: NEGATIVE — SIGNIFICANT CHANGE UP
PH UR: 7 — SIGNIFICANT CHANGE UP (ref 5–8)
PROT UR-MCNC: 30 MG/DL
RBC CASTS # UR COMP ASSIST: < 5 /HPF — SIGNIFICANT CHANGE UP
SP GR SPEC: 1.01 — SIGNIFICANT CHANGE UP (ref 1–1.03)
UROBILINOGEN FLD QL: 0.2 E.U./DL — SIGNIFICANT CHANGE UP
WBC UR QL: < 5 /HPF — SIGNIFICANT CHANGE UP

## 2023-07-27 RX ORDER — ACETAMINOPHEN 500 MG
650 TABLET ORAL ONCE
Refills: 0 | Status: COMPLETED | OUTPATIENT
Start: 2023-07-27 | End: 2023-07-27

## 2023-07-27 RX ADMIN — CALAMINE AND ZINC OXIDE AND PHENOL 1 APPLICATION(S): 160; 10 LOTION TOPICAL at 17:51

## 2023-07-27 RX ADMIN — Medication 2 MILLIGRAM(S): at 17:51

## 2023-07-27 RX ADMIN — LACOSAMIDE 140 MILLIGRAM(S): 50 TABLET ORAL at 07:06

## 2023-07-27 RX ADMIN — Medication 650 MILLIGRAM(S): at 19:30

## 2023-07-27 RX ADMIN — OXYCODONE HYDROCHLORIDE 5 MILLIGRAM(S): 5 TABLET ORAL at 04:39

## 2023-07-27 RX ADMIN — Medication 650 MILLIGRAM(S): at 20:15

## 2023-07-27 RX ADMIN — LACOSAMIDE 140 MILLIGRAM(S): 50 TABLET ORAL at 19:30

## 2023-07-27 RX ADMIN — Medication 100 MILLIGRAM(S): at 05:27

## 2023-07-27 RX ADMIN — OXYCODONE HYDROCHLORIDE 5 MILLIGRAM(S): 5 TABLET ORAL at 21:37

## 2023-07-27 RX ADMIN — AMLODIPINE BESYLATE 10 MILLIGRAM(S): 2.5 TABLET ORAL at 05:27

## 2023-07-27 RX ADMIN — HEPARIN SODIUM 5000 UNIT(S): 5000 INJECTION INTRAVENOUS; SUBCUTANEOUS at 05:28

## 2023-07-27 RX ADMIN — CHLORHEXIDINE GLUCONATE 1 APPLICATION(S): 213 SOLUTION TOPICAL at 05:26

## 2023-07-27 RX ADMIN — HEPARIN SODIUM 5000 UNIT(S): 5000 INJECTION INTRAVENOUS; SUBCUTANEOUS at 17:51

## 2023-07-27 RX ADMIN — Medication 2 MILLIGRAM(S): at 05:27

## 2023-07-27 RX ADMIN — CALAMINE AND ZINC OXIDE AND PHENOL 1 APPLICATION(S): 160; 10 LOTION TOPICAL at 05:26

## 2023-07-27 RX ADMIN — Medication 15 MILLILITER(S): at 11:51

## 2023-07-27 RX ADMIN — PANTOPRAZOLE SODIUM 40 MILLIGRAM(S): 20 TABLET, DELAYED RELEASE ORAL at 11:50

## 2023-07-27 RX ADMIN — OXYCODONE HYDROCHLORIDE 5 MILLIGRAM(S): 5 TABLET ORAL at 05:14

## 2023-07-27 RX ADMIN — Medication 81 MILLIGRAM(S): at 11:51

## 2023-07-27 RX ADMIN — OXYCODONE HYDROCHLORIDE 5 MILLIGRAM(S): 5 TABLET ORAL at 22:30

## 2023-07-27 NOTE — PROVIDER CONTACT NOTE (OTHER) - NAME OF MD/NP/PA/DO NOTIFIED:
LILIA painting
Ritu Barrett
LILIA Cao
LILIA Hillman
MD Balderas
MD Elizabeth Arango
MD Arango
Mildred (Team 1)
Team 1 & respiratory notified
Ani Team 1 PA
Elizabeth Arango MD
LILIA Brunson
LILIA Rondon
LILIA Rondon
Mariel RODRIGUES
Thuy (Team 1)
Alessia RODRIGUES
Dr. Jarvis Tavarez
Dr. Keane, Dr. Leigh
MD Arango
Mariel Nickerson,
Team 1 Md Brunson
Conor Qiua T1
Dr. Diya Bass
MD Arango
MD Craft
MD Elizabeth Arango

## 2023-07-27 NOTE — PROGRESS NOTE ADULT - SUBJECTIVE AND OBJECTIVE BOX
INTERVAL HPI/OVERNIGHT EVENTS: SABRINA, VSS     STATUS POST:    5/5: second stage TEVAR  5/13: IR Celiac, SMA, splenic, and left gastric artery angiogram reveals no pseudoaneurysm or any active bleeding.  5/22:  IR Aspiration of left peripancreatic fluid collection  (15cc sanguinous) and paracentesis (4L clear yellow fluid)  5/24: IR L peripancreatic abscess collection drainage and placement of 12F drainage catheter  5/25: Rosalee upsized existing abscess drain to 16F, placed new drain to another abscess collection on left, and two drain for ascites.  5/26: Placement of two new drainage catheters and exchange of two   drainage catheters  5/30: IR LUQ drainage of 400cc purulent fluid  5/31: ex lap, wash out of ascites/old blood/scant new blood, all CODIE drain removal, CODIE x3 placement, abdomen open, UOP 0, EBL??  6/1: No bleeding, evac of old blood, placement of feeding J-tube, abd closure  6/5: Bedside trach (Pulm)  6/9: Bronchoscopy, lavage  7/19: IVC filter placement    SUBJECTIVE: Pt seen and examined at bedside this am by surgery team. No acute complaints. +BMs. Tolerating diet, pain well controlled. Denies f/n/v/cp/sob.    MEDICATIONS  (STANDING):  amLODIPine   Tablet 10 milliGRAM(s) Oral daily  aspirin  chewable 81 milliGRAM(s) Oral daily  calamine/zinc oxide Lotion 1 Application(s) Topical two times a day  chlorhexidine 2% Cloths 1 Application(s) Topical <User Schedule>  heparin   Injectable 5000 Unit(s) SubCutaneous every 8 hours  lacosamide IVPB 200 milliGRAM(s) IV Intermittent every 12 hours  loperamide 2 milliGRAM(s) Oral every 12 hours  metoprolol succinate  milliGRAM(s) Oral daily  multivitamin/minerals/iron Oral Solution (CENTRUM) 15 milliLiter(s) Oral daily  pantoprazole  Injectable 40 milliGRAM(s) IV Push daily    MEDICATIONS  (PRN):  benzocaine/menthol Lozenge 1 Lozenge Oral every 6 hours PRN Sore Throat  labetalol Injectable 10 milliGRAM(s) IV Push every 6 hours PRN Systolic blood pressure > 160  ondansetron Injectable 4 milliGRAM(s) IV Push every 8 hours PRN Nausea and/or Vomiting  oxyCODONE    IR 5 milliGRAM(s) Oral every 6 hours PRN Severe Pain (7 - 10)  oxyCODONE    IR 2.5 milliGRAM(s) Oral every 6 hours PRN Moderate Pain (4 - 6)    Vital Signs Last 24 Hrs  T(C): 36.8 (27 Jul 2023 04:53), Max: 37.3 (26 Jul 2023 14:09)  T(F): 98.3 (27 Jul 2023 04:53), Max: 99.2 (26 Jul 2023 14:09)  HR: 82 (27 Jul 2023 04:10) (80 - 104)  BP: 128/78 (27 Jul 2023 04:09) (115/82 - 145/88)  BP(mean): 98 (27 Jul 2023 04:09) (94 - 111)  RR: 18 (27 Jul 2023 04:10) (17 - 19)  SpO2: 95% (27 Jul 2023 04:10) (95% - 100%)    Parameters below as of 27 Jul 2023 04:10  Patient On (Oxygen Delivery Method): room air      PHYSICAL EXAM:    Constitutional: A&Ox3, NAD    Respiratory: non labored breathing, no respiratory distress    Cardiovascular: NSR, RRR    Gastrointestinal: abdomen soft, ND, NT. Incision: Healing well. No rebound or guarding.    Extremities: wwp, no calf tenderness or edema. SCDs in place       I&O's Detail    26 Jul 2023 07:01  -  27 Jul 2023 07:00  --------------------------------------------------------  IN:  Total IN: 0 mL    OUT:    Voided (mL): 675 mL  Total OUT: 675 mL    Total NET: -675 mL          LABS:                RADIOLOGY & ADDITIONAL STUDIES:

## 2023-07-27 NOTE — CONSULT NOTE ADULT - SUBJECTIVE AND OBJECTIVE BOX
53M w PMHx of HTN, type A aortic dissection s/p Dacron grafts, AV resuspension in 2013, CAD s/p CABG x 1 SVG to RCA (5/2013 with Dr. Medina), seizure disorder, recent admission 3/20-4/14 for replacement of transverse aortic arch, second stage TEVAR and aorto-axillary bypass, AV replacement (bio 23mm), and CABG x 1 (SVG-RCA). Pt found to have endo leak and taken to OR for TEVAR revision on 5/5. Post op course c/b Klebsiella bacteremia (5/15), resp failure requiring intubation on 5/18, Mucus plugging s/p Bronch 5/19 and PEA arrest. Post operatively pt also found to have hemorrhagic pancreatitis with venu-pancreatic abscess possibly 2* to Depakote therefore s/p IR aspiration of peripancreatic fluid collection and paracentesis on 5/22, IR venu-pancreatic abscess drainage and placement of drainage catheter on 5/24. Pt then transferred from CTICU to SICU for septic shock, and further mgmt of hemorrhagic pancreatitis on 5/25. s/p IR placement of 2 new drainage catheters and catheter exchange on 5/26. LUQ drainage 5/30. OR 5/31 for exlap washout & replacement of 3 new JPs and abthera vac with open abdomen. RTOR 6/1, no bleeding, evac of old blood, placement of feeding J-tube, failed extubation due to PNA, completed ABX course and s/p trach 6/5 and decannulation 6/28. stepped down, but returned to SICU (7/2) for respiratory distress. Emergently intubated (7/3) for aspiration pneumonia, sputum cx grew pseudomonas. Extubated on 7/9. Recent CT with B/L chornic pulmonary embolisms and pneumatosis of cecum. Started on heparin gtt - stopped 2/2 to blood per rectum. Stepped up to SICU for HD monitoring on 7/18. Transferred back to SDU on 7/19 IVC filter placed on 7/19.      consulted for hematuria x 2 days. Per primary team, urine was blood tinged yesterday. No clots.Pt denies hx hematuria, smoking hx, cancer hx, issues voiding/emptying, dysuria.   Assessed at bedside. pt just voided 100cc clear yellow urine in urinal. + drop of blood on urethral meatus. Per RN, pt had condom cath yesterday but was taken off 2/2 irritation. Pt voiding 100-200 cc at at time PVR <200cc. RN states Bladder scan was done 2 hours after void.     Vital Signs Last 24 Hrs  T(C): 37.1 (27 Jul 2023 13:56), Max: 37.1 (27 Jul 2023 13:56)  T(F): 98.8 (27 Jul 2023 13:56), Max: 98.8 (27 Jul 2023 13:56)  HR: 86 (27 Jul 2023 11:50) (80 - 88)  BP: 140/85 (27 Jul 2023 11:50) (123/76 - 145/88)  BP(mean): 107 (27 Jul 2023 11:50) (94 - 111)  RR: 18 (27 Jul 2023 11:50) (17 - 18)  SpO2: 97% (27 Jul 2023 11:50) (95% - 100%)    Parameters below as of 27 Jul 2023 11:50  Patient On (Oxygen Delivery Method): room air      I&O's Summary    26 Jul 2023 07:01  -  27 Jul 2023 07:00  --------------------------------------------------------  IN: 0 mL / OUT: 675 mL / NET: -675 mL    27 Jul 2023 07:01  -  27 Jul 2023 15:50  --------------------------------------------------------  IN: 250 mL / OUT: 125 mL / NET: 125 mL        PE:  Gen: awake and alert  Abd: nontender, nondistended  : no suprapubic/CVAT.   Penis:+ drop of blood on urethral meatus. nontender      LABS:            Cultures      A/P

## 2023-07-27 NOTE — PROVIDER CONTACT NOTE (OTHER) - DATE AND TIME:
02-Jul-2023 00:30
18-Jul-2023 13:10
20-May-2023 17:07
26-May-2023 13:40
27-Jul-2023 10:54
30-Jun-2023 15:45
16-Jul-2023 16:29
17-Jul-2023 05:20
21-Jul-2023 18:45
26-May-2023 14:00
27-May-2023 23:40
01-Jun-2023 00:00
16-Jul-2023 09:12
20-Jun-2023 09:00
25-Jul-2023 15:00
26-Jun-2023 05:50
27-Jun-2023 09:01
30-Jun-2023 19:53
02-Jul-2023 13:50
16-Jul-2023 12:56
17-Jul-2023 05:40
12-May-2023 01:45
17-Jul-2023 19:58
24-Jul-2023 12:00
24-Jun-2023 02:30
10-Vazquez-2023 19:01
16-Jul-2023 11:43
16-Jul-2023 10:30

## 2023-07-27 NOTE — CONSULT NOTE ADULT - CONSULT REQUESTED BY NAME
Ignacio
TREY
Surgical Team
Dr. Craft
Dr. Solano
Fabian
Xiomara
Primary team
Surgery Team
Mildred
SICU
9EA
Dr. Pierre
Dr. Pierre
Surgery
PA cardiothoracic
CT Surgery

## 2023-07-27 NOTE — PROVIDER CONTACT NOTE (OTHER) - BACKGROUND
HTN
Hard stick, US guided IV
Patient has already received PRN dose of dilaudid for possible pain with no effect on B/P or MAP
Patient presented for aortic dissection
Pt admitted for hemorrhagic pancreatitis also s/p aortic dissection in May 2023
Pt has a 8 shiley, deflated cuff placed 6/7. Pt stated he removed the trach as he needed to go to the bathroom. Pt reorientated, advised that he was wearing a condom catheter. bladder scan 230 ml.
HTN
Hematoma new since return from IR.
Pt had low urine output yesterday as well, not drinking adequate liquids
 ST, /67, RR 22, SpO2 96% on Vent FiO2 40
HTN
HTN
Hx of Pancreatitis and ARDS
Pt had low urine output last night as well and was bladder scanned with low volume
S/P second stage TEVAR. Pt has CT head performed this shift.
uncontrolled SBP and DBP
Extensive history please see pt chart.
Presented for aortic dissection
Pt came in for AAA, s/p several procedures
Wife was updated about patient's status & she continued to yell in the room and hallway " Nobody given  me  a chair and I have to stand all day".
mgmnt of hemorrhagic pancreatitis
no

## 2023-07-27 NOTE — CONSULT NOTE ADULT - REASON FOR ADMISSION
endoleak, abdominal pain

## 2023-07-27 NOTE — CONSULT NOTE ADULT - CONSULT REQUESTED DATE/TIME
02-Jul-2023 15:50
09-May-2023 12:19
06-May-2023 10:44
22-May-2023 11:48
27-Jul-2023 14:43
09-May-2023
12-May-2023 19:36
18-Jul-2023 16:05
05-Jun-2023 10:42
08-May-2023 22:43
18-Jul-2023 17:23
24-May-2023 09:10
25-May-2023 12:22
13-May-2023
18-May-2023 17:23
16-Jul-2023 05:10
22-May-2023 10:56

## 2023-07-27 NOTE — PROVIDER CONTACT NOTE (OTHER) - ACTION/TREATMENT ORDERED:
Dr. Leigh and Dr. Tavarez at bedside, chest echo performed, chest x-ray performed, bronch to be performed, will continue to closely monitor
EKG done, BMP, CBC< lactate, PTT done. EKG done. Anesthesia at bedside. Respiratory at bedside, Pt on BIPAP. Chest xray done. STAT lasix given. Pt  HR now stable, satting well On bipap. Pt going to CT
MD Elizabeth Arango advised she will contact her chief for BP medication approval and submit order.
MD was made aware
No interventions ordered at this time
Mildred and team came to assess patient and patient's BM.  Further interventions to be provided.
Trach replaced by MD from SICU. Lungs auscultated and portable chest x-ray ordered. Pt's O2 saturation remained over 98%. Pt denies pain. suctioned as needed.
Compressed by LILIA Toledo, US done at bedside
MD Arango advised she will place another order of Labetalol to manage the patient BP.
Tylenol IV 1000mg IVP, Ice packs applied to Pts body, 100% NRB   Pt will be transferred to ICU.
continue to monitor
As per NP, ok to have nimbex at dosage where there is still 4/4 TOF but pt is comfortable, syncing with vent and paralyzed.
LILIA Cao advised she will get to bedside as soon as possible to place a US guided IV for the pts IV medication.
MD Arango advised to continue to monitor the patient. MD Arango contacting consulting team to request medication for BP.
Nursing Supervisor Alessia explained to patient's wife about not disrupting patient care. Wife was made aware that she will be removed if her behavior continues.  She is calm and apologized to staff.
Contacted T1, encouraged pt to drink fluids, bladder scan@ 16:34 72ml after void of 125, bladder scan @ 18:50 152ml.
Decreased levo drip. Increased Precedex. Labs ordered
Mariel RODRIGUES made aware of patient's orientation status / hematoma. PA states that team will assess patient at bedside during morning rounds. No interventions ordered at this time.
No interventions ordered at this time
MD Arango advised to continue to monitor. She will reach out to the consulting team to determine how to better manage pts BP.
Md Elizabeth Arango advised she will contact the consulting team regarding medication to give to manage BP.
No interventions ordered at this time.
Per Thuy will come to assess patient.
Provider assessed pt at bedside, pt responded appropriately to provider's questions and endorses abdominal pain. Ordered to administer 25 mcg of Fentanyl IVP and started Precedex infusion.
Contacted Mildred Carter @ 1200. Bladder scanned pt @ 12:10 with 55ml. Encouraged pt to drink. Pt voided 100ml @ 12:45. Will continue to monitor.
Team 1 Md Brunson stated that the MCV was still in range. No further action to be taken at this time.
Pt voided 225cc and had PVR of 133cc, EKG done, pt assessed at bedside, will continue to monitor

## 2023-07-27 NOTE — CONSULT NOTE ADULT - ASSESSMENT
53M w PMHx of HTN, type A aortic dissection s/p Dacron grafts, AV resuspension in 2013, CAD s/p CABG x 1 SVG to RCA (5/2013 with Dr. Medina), seizure disorder, recent admission 3/20-4/14 for replacement of transverse aortic arch, second stage TEVAR and aorto-axillary bypass, AV replacement (bio 23mm), and CABG x 1 (SVG-RCA). Pt found to have endo leak and taken to OR for TEVAR revision on 5/5. Post op course c/b Klebsiella bacteremia (5/15), resp failure requiring intubation on 5/18, Mucus plugging s/p Bronch 5/19 and PEA arrest. Post operatively pt also found to have hemorrhagic pancreatitis with venu-pancreatic abscess possibly 2* to Depakote therefore s/p IR aspiration of peripancreatic fluid collection and paracentesis on 5/22, IR venu-pancreatic abscess drainage and placement of drainage catheter on 5/24. Pt then transferred from CTICU to SICU for septic shock, and further mgmt of hemorrhagic pancreatitis on 5/25. s/p IR placement of 2 new drainage catheters and catheter exchange on 5/26. LUQ drainage 5/30. OR 5/31 for exlap washout & replacement of 3 new JPs and abthera vac with open abdomen. RTOR 6/1, no bleeding, evac of old blood, placement of feeding J-tube, failed extubation due to PNA, completed ABX course and s/p trach 6/5 and decannulation 6/28. stepped down, but returned to SICU (7/2) for respiratory distress. Emergently intubated (7/3) for aspiration pneumonia, sputum cx grew pseudomonas. Extubated on 7/9. Recent CT with B/L chornic pulmonary embolisms and pneumatosis of cecum. Started on heparin gtt - stopped 2/2 to blood per rectum. Stepped up to SICU for HD monitoring on 7/18. Transferred back to SDU on 7/19 IVC filter placed on 7/19.      consulted for hematuria x 2 days. Per primary team, urine was blood tinged yesterday. No clots. Pt denies hx hematuria, smoking hx, cancer hx, issues voiding/emptying, dysuria.   Assessed at bedside. pt just voided 100cc clear yellow urine in urinal. + drop of blood on urethral meatus. Per RN, pt had condom cath yesterday but was taken off 2/2 irritation. Pt voiding 100-200 cc at at time PVR <200cc. RN states Bladder scan was done 2 hours after void.     Plan:  -trend PVR's. please make sure bladder scan is done soon after pt voids for accuracy  -please send repeat UA/UCx  -please page  if PVR>300 or pt is uncomfortanle 53M w PMHx of HTN, type A aortic dissection s/p Dacron grafts, AV resuspension in 2013, CAD s/p CABG x 1 SVG to RCA (5/2013 with Dr. Medina), seizure disorder, recent admission 3/20-4/14 for replacement of transverse aortic arch, second stage TEVAR and aorto-axillary bypass, AV replacement (bio 23mm), and CABG x 1 (SVG-RCA). Pt found to have endo leak and taken to OR for TEVAR revision on 5/5. Post op course c/b Klebsiella bacteremia (5/15), resp failure requiring intubation on 5/18, Mucus plugging s/p Bronch 5/19 and PEA arrest. Post operatively pt also found to have hemorrhagic pancreatitis with venu-pancreatic abscess possibly 2* to Depakote therefore s/p IR aspiration of peripancreatic fluid collection and paracentesis on 5/22, IR venu-pancreatic abscess drainage and placement of drainage catheter on 5/24. Pt then transferred from CTICU to SICU for septic shock, and further mgmt of hemorrhagic pancreatitis on 5/25. s/p IR placement of 2 new drainage catheters and catheter exchange on 5/26. LUQ drainage 5/30. OR 5/31 for exlap washout & replacement of 3 new JPs and abthera vac with open abdomen. RTOR 6/1, no bleeding, evac of old blood, placement of feeding J-tube, failed extubation due to PNA, completed ABX course and s/p trach 6/5 and decannulation 6/28. stepped down, but returned to SICU (7/2) for respiratory distress. Emergently intubated (7/3) for aspiration pneumonia, sputum cx grew pseudomonas. Extubated on 7/9. Recent CT with B/L chornic pulmonary embolisms and pneumatosis of cecum. Started on heparin gtt - stopped 2/2 to blood per rectum. Stepped up to SICU for HD monitoring on 7/18. Transferred back to SDU on 7/19 IVC filter placed on 7/19.      consulted for hematuria x 2 days. Per primary team, urine was blood tinged yesterday. No clots. Pt denies hx hematuria, smoking hx, cancer hx, issues voiding/emptying, dysuria.   Assessed at bedside. pt just voided 100cc clear yellow urine in urinal. + drop of blood on urethral meatus. Per RN, pt had condom cath yesterday but was taken off 2/2 irritation. Pt voiding 100-200 cc at at time PVR <200cc. RN states Bladder scan was done 2 hours after void.     Plan:  -trend PVR's. please make sure bladder scan is done soon after pt voids for accuracy  -please send repeat UA/UCx  -please page  if PVR>300 or pt is uncomfortable  -can follow up outpatient with Dr. Cardoso

## 2023-07-27 NOTE — PROGRESS NOTE ADULT - ASSESSMENT
A/p: 53M w PMHx of HTN, type A aortic dissection s/p Dacron grafts, AV resuspension in 2013, CAD s/p CABG x 1 SVG to RCA (5/2013 with Dr. Medina), seizure disorder, recent admission 3/20-4/14 for replacement of transverse aortic arch, second stage TEVAR and aorto-axillary bypass, AV replacement (bio 23mm), and CABG x 1 (SVG-RCA). Pt found to have endo leak and taken to OR for TEVAR revision on 5/5. Post op course c/b Klebsiella bacteremia (5/15), resp failure requiring intubation on 5/18, Mucus plugging s/p Bronch 5/19 and PEA arrest. Post operatively pt also found to have hemorrhagic pancreatitis with venu-pancreatic abscess possibly 2* to Depakote therefore s/p IR aspiration of peripancreatic fluid collection and paracentesis on 5/22, IR venu-pancreatic abscess drainage and placement of drainage catheter on 5/24. Pt then transferred from CTICU to SICU for septic shock, and further mgmt of hemorrhagic pancreatitis on 5/25. s/p IR placement of 2 new drainage catheters and catheter exchange on 5/26. LUQ drainage 5/30. OR 5/31 for exlap washout & replacement of 3 new JPs and abthera vac with open abdomen. RTOR 6/1, no bleeding, evac of old blood, placement of feeding J-tube, failed extubation due to PNA, completed ABX course and s/p trach 6/5 and decannulation 6/28. stepped down, but returned to SICU (7/2) for respiratory distress. Emergently intubated (7/3) for aspiration pneumonia, sputum cx grew pseudomonas. Extubated on 7/9. Recent CT with B/L chornic pulmonary embolisms and pneumatosis of cecum. Started on heparin gtt - stopped 2/2 to blood per rectum. Stepped up to SICU for HD monitoring on 7/18. Transferred back to SDU on 7/19 IVC filter placed on 7/19.     Soft diet  Pain/nausea control prn  ASA  SQH/SCDs  OOBA/IS   Dispo: COURT

## 2023-07-27 NOTE — CHART NOTE - NSCHARTNOTEFT_GEN_A_CORE
Admitting Diagnosis:   Patient is a 53y old  Male who presents with a chief complaint of endoleak, abdominal pain (27 Jul 2023 07:57)      PAST MEDICAL & SURGICAL HISTORY:  HTN (hypertension)      Aortic dissection      CAD (coronary artery disease)      Seizure disorder      Seizure disorder      Hypertension      Status post endovascular aneurysm repair (EVAR)      S/P aortic bifurcation bypass graft      S/P CABG x 1      Current Nutrition Order: Minced and moist + 2 Ensure Enlive daily [700kcals, 40g protein]    PO Intake: Good (%) [   ]  Fair (50-75%) [ X ] Poor (<25%) [   ] - per pt report     GI Issues: denies nvdc    Pain: denies pain/discomfort     Skin Integrity: sx incision to abdomen, stage II PU to L shoulder & sacrum    Labs:       CAPILLARY BLOOD GLUCOSE      Medications:  MEDICATIONS  (STANDING):  amLODIPine   Tablet 10 milliGRAM(s) Oral daily  aspirin  chewable 81 milliGRAM(s) Oral daily  calamine/zinc oxide Lotion 1 Application(s) Topical two times a day  chlorhexidine 2% Cloths 1 Application(s) Topical <User Schedule>  heparin   Injectable 5000 Unit(s) SubCutaneous every 8 hours  lacosamide IVPB 200 milliGRAM(s) IV Intermittent every 12 hours  loperamide 2 milliGRAM(s) Oral every 12 hours  metoprolol succinate  milliGRAM(s) Oral daily  multivitamin/minerals/iron Oral Solution (CENTRUM) 15 milliLiter(s) Oral daily  pantoprazole  Injectable 40 milliGRAM(s) IV Push daily    MEDICATIONS  (PRN):  benzocaine/menthol Lozenge 1 Lozenge Oral every 6 hours PRN Sore Throat  labetalol Injectable 10 milliGRAM(s) IV Push every 6 hours PRN Systolic blood pressure > 160  ondansetron Injectable 4 milliGRAM(s) IV Push every 8 hours PRN Nausea and/or Vomiting  oxyCODONE    IR 5 milliGRAM(s) Oral every 6 hours PRN Severe Pain (7 - 10)  oxyCODONE    IR 2.5 milliGRAM(s) Oral every 6 hours PRN Moderate Pain (4 - 6)      Weight:  Daily     Daily     Weight Change: no new wts to review at this time, last wt taken on 7/4. Please obtain current wt and then biweekly wts to monitor trends and better assess nutrition adequacy     Estimated energy needs:   Ideal body weight (80.9kg) used for calculations as pt >100% of IBW, BMI <30 per [facility] Standards of Care. Needs estimated for age and adjusted for current clinical status     Calories: 2022.5-2427 kcals based on 25-30 kcals/kg  Protein: 113.3-129.4g protein based on 1.4-1.6g protein/kg  Fluids: 2022.5-2427ml based on 25-30ml/kg    Subjective:   53M w PMHx of HTN, type A aortic dissection s/p Dacron grafts, AV resuspension in 2013, CAD s/p CABG x 1 SVG to RCA (5/2013 with Dr. Medina), seizure disorder, recent admission 3/20-4/14 for replacement of transverse aortic arch, second stage TEVAR and aorto-axillary bypass, AV replacement (bio 23mm), and CABG x 1 (SVG-RCA). Pt found to have endo leak and taken to OR for TEVAR revision on 5/5. Post op course c/b Klebsiella bacteremia (5/15), resp failure requiring intubation on 5/18, Mucus plugging s/p Bronch 5/19 and PEA arrest. Post operatively pt also found to have hemorrhagic pancreatitis with venu-pancreatic abscess possibly 2* to Depakote therefore s/p IR aspiration of peripancreatic fluid collection and paracentesis on 5/22, IR venu-pancreatic abscess drainage and placement of drainage catheter on 5/24. Pt then transferred from CTICU to SICU for septic shock, and further mgmt of hemorrhagic pancreatitis on 5/25. s/p IR placement of 2 new drainage catheters and catheter exchange on 5/26. LUQ drainage 5/30. OR 5/31 for exlap washout & replacement of 3 new JPs and abthera vac with open abdomen. RTOR 6/1, no bleeding, evac of old blood, placement of feeding J-tube, failed extubation due to PNA, completed ABX course and s/p trach 6/5 and decannulation 6/28. stepped down, but returned to SICU (7/2) for respiratory distress. Emergently intubated (7/3) for aspiration pneumonia, sputum cx grew pseudomonas. Extubated on 7/9. FEES 7/12: minced moist with thin. 7/14: SDU from SICU. 7/25: Pending COURT     Visited pt at bedside this am on 8LA. On assessment pt is awake and alert. Reports continuing with fair intake; tolerates 50-75% meal tray. Reports consuming 100% oral nutrition supplement regimen. Therefore current diet order remains appropriate. Nutrition related labs are unremarkable. No further nutrition-related concerns at this time. RD to f/u prn. Please view full recs below.     Previous Nutrition Diagnosis: Increased nutrients RT increased demands AEB clinical course, Pressure ulcers    Active [ X ]  Resolved [   ]    Goal: Pt will meet at least 75% of protein & energy needs via most appropriate route for nutrition     Recommendations:  1. c/w current diet order  - honor food preferences as able  - encouragement at meal times for adequate PO intake  2. c/w Ensure Enlive BID [700 kcals, 40g protein]  3. Consider 1pk Peter BID; provides 160 kcals, 14g arginine, 14g glutamine, 5g collagen   - to promote wound healing  4. GI  - antiemetic/prokinetic agent prn to promote tolerance  - imodium as needed  5. Monitor chemistry, GI function, skin integrity  6. To monitor clinical course and adjust recommendations as needed    Education: encouraged adequate PO intake in-house with focus on nutrient dense foods     Risk Level: High [ X ] Moderate [   ] Low [   ]

## 2023-07-27 NOTE — PROVIDER CONTACT NOTE (OTHER) - RECOMMENDATIONS
Contact provider, bladder scan, encourage pt to drink more liquids, continue to monitor.
Security  Team & On-call Nursing supervisor Alessia called.
Team to be made aware
bladder scan, EKG
IVP BP medication
Labetalol or Metoprolol IVP to manage BP
BP medication for patient. Will administer pain medication to patient for globalized pain.
MD was made aware
10mg labetalol ordered and given
Contact provider, encourage to drink, bladder scan.
EKG, STAT labs, Chest xray. Furosemide given.
Per Thuy will come to assess patient.
Team 1 notified. LILIA Law advised it is OK to hold fluids for now since patient is not allowing the RN to put in another IV.
Team one notified and respiratory notified.
US guided IV.
IVP Metoprolol
IVP Metoprolol
LILIA Brunson notified. Said it is ok to hold if patient is refusing. LILIA Brunson said she will come bedside to talk to patient.
Team 1 notified, LILIA Rondon aware. No other interventions done at this time.

## 2023-07-27 NOTE — PROVIDER CONTACT NOTE (OTHER) - SITUATION
HR 88  /108 after 10mg of Labetalol was given. Requesting alternate BP medication to manage HTN
Noon VS taken. /108 HR 98. Requesting medication to manage pts BP.
Patient heart rate 130 sinus tachycardia
Retake of BP after Labetalol 10mg was given was 182-109 HR 84. Requesting alternate BP medication.
Patient is restarted on Vital 1.0. Patient refusing this specific feed because he claims it gives him diarrhea. Patient claims team said they would use something else. RN asked team to come bedside.
Pt HR go up to 136, O2 86 on 4L, /130, Pt complain of SOB, LILIA Segovia made aware, nonrebreather placed, fluid discontinue, chest x-ray, EKG ordered, STAT lab ordered, will continue monitor.
Pt difficult to arouse & disoriented
Pt has no urine output during shift
Pt is not answering questions appropriately, continues to repeat same phrase "That is good, thank you" even RN attempts to reorient.
Came in at the beginning of the shift and pt has rectal tube. Pt has been having frequent loose stools. Negative for C.diff. There no order but night placed it due to 4 bowel  movements during overnig
Patient had bowel movement streaked with blood.  Patient is currently on Heparin IV.
Patient has new right groin hematoma. Soft, outlined with marker.
Pt Tachy to 130s, CO of SOB, desat to 81%, SBP and DBP elevated overnight. Denies pain.
Patient MAP goal of  is not being met.  Currently Patient  B/P 236/71
Pt on nimbex for paralytic outcome.
Pt tachycardic sustained to 115 and low urine output
Jhon Shaffer from the lab called. He stated the patient's MCV has changed from 104.3 on 6/26/23 to 96.3 on 6/27/2023
Pt temp 102.7, Heart rate 120's, spo2 85% on 4LNC
Right side of the chest appears taut and over-inflated, left of side chest appears less inflated, asymmetrical chest rise
/105 HR 98, Repeat 163/118 HR 98
Patient developed new cough after eating a piece of cheeseburger as per the wife.
Wife of patient yelling at staff about recliner being removed from the room. A chair was provided to patient
pt /107 repeat /114, Parameter SBP <160. Requesting Labetalol or Metoprolol to manage BP, HR above 80's
Only one access available. US guided IV need from provider.
Patient L IV that was placed earlier today has caused arm swelling
Pt has a low urine output today of 50ml and 1 incontinent count since 8am
Pt voided and has small amount of blood in urine and blanket
Staff entered room and observed that the trach was no longer in place. Pt continued to sat well at over 95%. No distress noted. When asked the pt stated that he had removed the trach.

## 2023-07-27 NOTE — PROVIDER CONTACT NOTE (OTHER) - REASON
Disoriented, hematoma on groin
Pt has rectal tube
Pt voided and has blood in urine and blanket
temp 102.7, Heart rate 120's, spo2 85% on 4LNC
, O2 86 on 4L
Disruptive to patient care
Asymmetrical Chest Inflation
Lab called re MCV change overnight from 104.3 to 96.3
Noon VS taken. /108 HR 98. Requesting medication to manage pts BP.
Patient MAP above desired parameter
Sustained heart rate 130
/105 HR 98, Repeat 163/118 HR 98
Blood in stool
Patient developed cough after eating cheeseburger
Retake of BP after Labetalol 10mg was given was 182-109 HR 84.
Low urine output
Nimbex
Only one access available. US guided IV need from provider.
Patient IV caused L arm swelling
Pt Tachy 130s, Desat to 80s, Hypertensive SBP and DBP.
Pt removed trach
Right Groin Hematoma
pt /107 repeat /114, Parameter SBP <160
HR 88  /108 after 10mg of Labetalol was given
Patient refusing feeds
Pt hypertensive, reporting 10/10 abdominal pain and restless in bed. Endorsed that pt is AxO2 at this time, but pt is continuing to repeat "That is good, thank you" even after RN attempts to reorient and redirect conversation.
Pt tachy, low urine output
Low urine output

## 2023-07-27 NOTE — PROVIDER CONTACT NOTE (OTHER) - ASSESSMENT
Pt has a low urine output today of 50ml and 1 incontinent count since 8am
Pt is AxO2, restless in bed and reporting 10/10 abdominal pain.
temp 102.7, Heart rate 120's, spo2 85% on 4LNC. Pt denies chest pain.
, /96, , c/o abdominal pain, urine output minimal
Pt on 7mcg of nimbex and 4/4 TOF.
Pt was resting comfortably in bed. No c/o dizziness, headache or pain.
Pt was resting comfortably in bed. No c/o.
VSS. No pain at IV sight, flushing well.
AOX4, NSR, VSS. Patient HOB put up, coughing on and off for past 10 minutes. O2 Saturation still remaining above 95%.
No urine output
Patient looks comfortable. Breathing with the vent.
Patient stable
RN attempted a IV placement for the patient. Pt is a hard stick.
VSS, no pain or discomfort.
Pt O2 saturation remain over 95% despite the removal of the trach. No signs of distress were observed, pt's breathing was unlabored and regular. Obturator put in place by RN
Pt was resting comfortably in bed. No c/o chest pain or dizziness.
, /67, RR 22, SpO2 96% on vent FiO2 40
Wife grabbing patient's face and touching the ETT. She was made aware of the dangers of touching the ETT. She said " I am his wife and I know better than you do". MD Craft at bedside explaining to her that she disrupting nursing care and it dangerous touching the ETT. She continued to yell at staff and belittle the staff.
Pt found to be arousable by repeated stimuli. For about 10 minutes, patient was alert but refusing to verbally respond to RN. After 10 minutes, patient awake and alert. AOx3, disoriented to time. Pt appeared confused as to why RN was waking him despite being reoriented multiple times. Pt agreed to med administration.   While completing AM care, hematoma noted on patient's right groin. Firm on palpation.
4 bowel movements
Pt was resting comfortably in bed. No c/o dizziness, cheat pain or headache.
Pt Denies pain, CO of SOB. Pt anxious. Tachy to 130s.
Pt VSS stable and within parameters no signs of distress observed no c/o pain, N&V or CP.
Pt was resting in bed. Pt complains of being tired and having global pain.

## 2023-07-28 RX ORDER — ACETAMINOPHEN 500 MG
650 TABLET ORAL ONCE
Refills: 0 | Status: COMPLETED | OUTPATIENT
Start: 2023-07-28 | End: 2023-07-28

## 2023-07-28 RX ADMIN — Medication 2 MILLIGRAM(S): at 18:20

## 2023-07-28 RX ADMIN — HEPARIN SODIUM 5000 UNIT(S): 5000 INJECTION INTRAVENOUS; SUBCUTANEOUS at 13:01

## 2023-07-28 RX ADMIN — HEPARIN SODIUM 5000 UNIT(S): 5000 INJECTION INTRAVENOUS; SUBCUTANEOUS at 21:50

## 2023-07-28 RX ADMIN — LACOSAMIDE 140 MILLIGRAM(S): 50 TABLET ORAL at 05:38

## 2023-07-28 RX ADMIN — Medication 15 MILLILITER(S): at 13:54

## 2023-07-28 RX ADMIN — CALAMINE AND ZINC OXIDE AND PHENOL 1 APPLICATION(S): 160; 10 LOTION TOPICAL at 05:39

## 2023-07-28 RX ADMIN — Medication 100 MILLIGRAM(S): at 05:39

## 2023-07-28 RX ADMIN — CHLORHEXIDINE GLUCONATE 1 APPLICATION(S): 213 SOLUTION TOPICAL at 06:24

## 2023-07-28 RX ADMIN — CALAMINE AND ZINC OXIDE AND PHENOL 1 APPLICATION(S): 160; 10 LOTION TOPICAL at 18:20

## 2023-07-28 RX ADMIN — LACOSAMIDE 140 MILLIGRAM(S): 50 TABLET ORAL at 19:21

## 2023-07-28 RX ADMIN — Medication 81 MILLIGRAM(S): at 13:01

## 2023-07-28 RX ADMIN — HEPARIN SODIUM 5000 UNIT(S): 5000 INJECTION INTRAVENOUS; SUBCUTANEOUS at 03:00

## 2023-07-28 RX ADMIN — PANTOPRAZOLE SODIUM 40 MILLIGRAM(S): 20 TABLET, DELAYED RELEASE ORAL at 13:01

## 2023-07-28 RX ADMIN — Medication 650 MILLIGRAM(S): at 20:00

## 2023-07-28 RX ADMIN — Medication 2 MILLIGRAM(S): at 05:39

## 2023-07-28 RX ADMIN — Medication 650 MILLIGRAM(S): at 09:45

## 2023-07-28 RX ADMIN — AMLODIPINE BESYLATE 10 MILLIGRAM(S): 2.5 TABLET ORAL at 05:39

## 2023-07-28 RX ADMIN — Medication 650 MILLIGRAM(S): at 19:41

## 2023-07-28 NOTE — PROGRESS NOTE ADULT - SUBJECTIVE AND OBJECTIVE BOX
STATUS POST:    : second stage TEVAR  : IR Celiac, SMA, splenic, and left gastric artery angiogram reveals no pseudoaneurysm or any active bleeding.  :  IR Aspiration of left peripancreatic fluid collection  (15cc sanguinous) and paracentesis (4L clear yellow fluid)  : IR L peripancreatic abscess collection drainage and placement of 12F drainage catheter  : Rosalee upsized existing abscess drain to 16F, placed new drain to another abscess collection on left, and two drain for ascites.  : Placement of two new drainage catheters and exchange of two   drainage catheters  : IR LUQ drainage of 400cc purulent fluid  : ex lap, wash out of ascites/old blood/scant new blood, all CODIE drain removal, CODIE x3 placement, abdomen open, UOP 0, EBL??  : No bleeding, evac of old blood, placement of feeding J-tube, abd closure  : Bedside trach (Pulm)  : Bronchoscopy, lavage  : IVC filter placement    ON: tylenol for HA. negative UA.      SUBJECTIVE: Patient seen and examined bedside by chief resident, pain near feeding tube. Tolerating diet, -n/-v/+f/+bm. Min OOBA. No further complaints regarding bloody near penis. Denies sob, forrest, ha, dizziness.    amLODIPine   Tablet 10 milliGRAM(s) Oral daily  aspirin  chewable 81 milliGRAM(s) Oral daily  heparin   Injectable 5000 Unit(s) SubCutaneous every 8 hours  labetalol Injectable 10 milliGRAM(s) IV Push every 6 hours PRN  metoprolol succinate  milliGRAM(s) Oral daily      Vital Signs Last 24 Hrs  T(C): 36.8 (2023 04:49), Max: 37.1 (2023 13:56)  T(F): 98.2 (2023 04:49), Max: 98.8 (2023 13:56)  HR: 78 (2023 05:33) (78 - 88)  BP: 131/85 (2023 03:45) (127/81 - 140/85)  BP(mean): 103 (2023 03:45) (99 - 110)  RR: 17 (2023 03:45) (17 - 18)  SpO2: 99% (2023 05:33) (96% - 100%)    Parameters below as of 2023 03:45  Patient On (Oxygen Delivery Method): room air      I&O's Detail    2023 07:01  -  2023 07:00  --------------------------------------------------------  IN:    IV PiggyBack: 50 mL    Oral Fluid: 650 mL  Total IN: 700 mL    OUT:    Voided (mL): 1025 mL  Total OUT: 1025 mL    Total NET: -325 mL          General: NAD, resting comfortably in bed  C/V: NSR  Neck; calamine lotion on R side of neck  Pulm: Nonlabored breathing, no respiratory distress, room air  Abd: soft, ND, ttp near J tube, no erythema or discharge.   Incisions; c/d/i  J tube; clamped  Extrem: WWP, no edema, SCDs in place        LABS:            Urinalysis Basic - ( 2023 20:03 )    Color: Yellow / Appearance: Clear / S.015 / pH: x  Gluc: x / Ketone: NEGATIVE  / Bili: Negative / Urobili: 0.2 E.U./dL   Blood: x / Protein: 30 mg/dL / Nitrite: NEGATIVE   Leuk Esterase: NEGATIVE / RBC: < 5 /HPF / WBC < 5 /HPF   Sq Epi: x / Non Sq Epi: x / Bacteria: None /HPF        RADIOLOGY & ADDITIONAL STUDIES:

## 2023-07-28 NOTE — PROGRESS NOTE ADULT - ASSESSMENT
A/p: 53M w PMHx of HTN, type A aortic dissection s/p Dacron grafts, AV resuspension in 2013, CAD s/p CABG x 1 SVG to RCA (5/2013 with Dr. Medina), seizure disorder, recent admission 3/20-4/14 for replacement of transverse aortic arch, second stage TEVAR and aorto-axillary bypass, AV replacement (bio 23mm), and CABG x 1 (SVG-RCA). Pt found to have endo leak and taken to OR for TEVAR revision on 5/5. Post op course c/b Klebsiella bacteremia (5/15), resp failure requiring intubation on 5/18, Mucus plugging s/p Bronch 5/19 and PEA arrest. Post operatively pt also found to have hemorrhagic pancreatitis with venu-pancreatic abscess possibly 2* to Depakote therefore s/p IR aspiration of peripancreatic fluid collection and paracentesis on 5/22, IR venu-pancreatic abscess drainage and placement of drainage catheter on 5/24. Pt then transferred from CTICU to SICU for septic shock, and further mgmt of hemorrhagic pancreatitis on 5/25. s/p IR placement of 2 new drainage catheters and catheter exchange on 5/26. LUQ drainage 5/30. OR 5/31 for exlap washout & replacement of 3 new JPs and abthera vac with open abdomen. RTOR 6/1, no bleeding, evac of old blood, placement of feeding J-tube, failed extubation due to PNA, completed ABX course and s/p trach 6/5 and decannulation 6/28. stepped down, but returned to SICU (7/2) for respiratory distress. Emergently intubated (7/3) for aspiration pneumonia, sputum cx grew pseudomonas. Extubated on 7/9. Recent CT with B/L chornic pulmonary embolisms and pneumatosis of cecum. Started on heparin gtt - stopped 2/2 to blood per rectum. Stepped up to SICU for HD monitoring on 7/18. Transferred back to SDU on 7/19 IVC filter placed on 7/19. F/u am labs and continue monitoring pain near J tube    Soft diet  Pain/nausea control prn  ASA  J tube clamped  SQH/SCDs  OOBA/IS   Dispo: COURT

## 2023-07-29 RX ORDER — LIDOCAINE 4 G/100G
1 CREAM TOPICAL ONCE
Refills: 0 | Status: DISCONTINUED | OUTPATIENT
Start: 2023-07-29 | End: 2023-08-03

## 2023-07-29 RX ORDER — LACOSAMIDE 50 MG/1
200 TABLET ORAL EVERY 12 HOURS
Refills: 0 | Status: DISCONTINUED | OUTPATIENT
Start: 2023-07-30 | End: 2023-08-03

## 2023-07-29 RX ORDER — ACETAMINOPHEN 500 MG
650 TABLET ORAL EVERY 6 HOURS
Refills: 0 | Status: DISCONTINUED | OUTPATIENT
Start: 2023-07-29 | End: 2023-08-03

## 2023-07-29 RX ORDER — PANTOPRAZOLE SODIUM 20 MG/1
40 TABLET, DELAYED RELEASE ORAL
Refills: 0 | Status: DISCONTINUED | OUTPATIENT
Start: 2023-07-30 | End: 2023-08-03

## 2023-07-29 RX ADMIN — Medication 10 MILLIGRAM(S): at 09:49

## 2023-07-29 RX ADMIN — Medication 2 MILLIGRAM(S): at 07:04

## 2023-07-29 RX ADMIN — Medication 2 MILLIGRAM(S): at 18:28

## 2023-07-29 RX ADMIN — Medication 650 MILLIGRAM(S): at 19:35

## 2023-07-29 RX ADMIN — LACOSAMIDE 140 MILLIGRAM(S): 50 TABLET ORAL at 07:04

## 2023-07-29 RX ADMIN — Medication 650 MILLIGRAM(S): at 18:30

## 2023-07-29 RX ADMIN — AMLODIPINE BESYLATE 10 MILLIGRAM(S): 2.5 TABLET ORAL at 07:03

## 2023-07-29 RX ADMIN — Medication 81 MILLIGRAM(S): at 11:55

## 2023-07-29 RX ADMIN — PANTOPRAZOLE SODIUM 40 MILLIGRAM(S): 20 TABLET, DELAYED RELEASE ORAL at 11:55

## 2023-07-29 RX ADMIN — CALAMINE AND ZINC OXIDE AND PHENOL 1 APPLICATION(S): 160; 10 LOTION TOPICAL at 18:58

## 2023-07-29 RX ADMIN — Medication 650 MILLIGRAM(S): at 04:00

## 2023-07-29 RX ADMIN — Medication 650 MILLIGRAM(S): at 09:50

## 2023-07-29 RX ADMIN — Medication 650 MILLIGRAM(S): at 03:15

## 2023-07-29 RX ADMIN — HEPARIN SODIUM 5000 UNIT(S): 5000 INJECTION INTRAVENOUS; SUBCUTANEOUS at 16:46

## 2023-07-29 RX ADMIN — Medication 15 MILLILITER(S): at 11:56

## 2023-07-29 RX ADMIN — LACOSAMIDE 140 MILLIGRAM(S): 50 TABLET ORAL at 18:28

## 2023-07-29 RX ADMIN — Medication 650 MILLIGRAM(S): at 10:52

## 2023-07-29 RX ADMIN — CALAMINE AND ZINC OXIDE AND PHENOL 1 APPLICATION(S): 160; 10 LOTION TOPICAL at 07:07

## 2023-07-29 RX ADMIN — Medication 100 MILLIGRAM(S): at 07:03

## 2023-07-29 RX ADMIN — CHLORHEXIDINE GLUCONATE 1 APPLICATION(S): 213 SOLUTION TOPICAL at 07:00

## 2023-07-29 RX ADMIN — HEPARIN SODIUM 5000 UNIT(S): 5000 INJECTION INTRAVENOUS; SUBCUTANEOUS at 07:03

## 2023-07-29 NOTE — PROGRESS NOTE ADULT - SUBJECTIVE AND OBJECTIVE BOX
SUBJECTIVE:  Patient was seen this AM at bedside with chief resident. Patient is doing well this AM and is currently denying pain, nausea, or vomiting. Confirms flatus and BM. Patient states he and wife have picked out a rehab facility and are anxious to leave the hospital.    MEDICATIONS  (STANDING):  amLODIPine   Tablet 10 milliGRAM(s) Oral daily  aspirin  chewable 81 milliGRAM(s) Oral daily  calamine/zinc oxide Lotion 1 Application(s) Topical two times a day  chlorhexidine 2% Cloths 1 Application(s) Topical <User Schedule>  heparin   Injectable 5000 Unit(s) SubCutaneous every 8 hours  lacosamide IVPB 200 milliGRAM(s) IV Intermittent every 12 hours  loperamide 2 milliGRAM(s) Oral every 12 hours  metoprolol succinate  milliGRAM(s) Oral daily  multivitamin/minerals/iron Oral Solution (CENTRUM) 15 milliLiter(s) Oral daily  pantoprazole  Injectable 40 milliGRAM(s) IV Push daily    MEDICATIONS  (PRN):  acetaminophen     Tablet .. 650 milliGRAM(s) Oral every 6 hours PRN Mild Pain (1 - 3), Moderate Pain (4 - 6), Severe Pain (7 - 10)  benzocaine/menthol Lozenge 1 Lozenge Oral every 6 hours PRN Sore Throat  labetalol Injectable 10 milliGRAM(s) IV Push every 6 hours PRN Systolic blood pressure > 160  lidocaine   4% Patch 1 Patch Transdermal once PRN Superficial MSK Pain  ondansetron Injectable 4 milliGRAM(s) IV Push every 8 hours PRN Nausea and/or Vomiting      Vital Signs Last 24 Hrs  T(C): 37 (2023 17:55), Max: 37.3 (2023 18:25)  T(F): 98.6 (2023 17:55), Max: 99.2 (2023 18:25)  HR: 90 (2023 12:05) (87 - 94)  BP: 145/88 (2023 12:05) (131/86 - 157/106)  BP(mean): 111 (2023 12:05) (104 - 126)  RR: 17 (2023 12:05) (16 - 18)  SpO2: 100% (2023 12:05) (94% - 100%)    Parameters below as of 2023 12:05  Patient On (Oxygen Delivery Method): room air        Physical Exam:  General: NAD, resting comfortably in bed  Pulmonary: Nonlabored breathing, no respiratory distress  Cardiovascular: NSR  Abdominal: soft, NT/ND  Extremities: WWP, normal strength  Neuro: A/O x 3, CNs II-XII grossly intact, no focal deficits, normal motor/sensation  Pulses: palpable distal pulses    I&O's Summary    2023 07:01  -  2023 07:00  --------------------------------------------------------  IN: 1050 mL / OUT: 875 mL / NET: 175 mL    2023 07:01  -  2023 18:08  --------------------------------------------------------  IN: 0 mL / OUT: 200 mL / NET: -200 mL        LABS:            Urinalysis Basic - ( 2023 20:03 )    Color: Yellow / Appearance: Clear / S.015 / pH: x  Gluc: x / Ketone: NEGATIVE  / Bili: Negative / Urobili: 0.2 E.U./dL   Blood: x / Protein: 30 mg/dL / Nitrite: NEGATIVE   Leuk Esterase: NEGATIVE / RBC: < 5 /HPF / WBC < 5 /HPF   Sq Epi: x / Non Sq Epi: x / Bacteria: None /HPF      CAPILLARY BLOOD GLUCOSE            RADIOLOGY & ADDITIONAL STUDIES:

## 2023-07-30 RX ADMIN — AMLODIPINE BESYLATE 10 MILLIGRAM(S): 2.5 TABLET ORAL at 06:45

## 2023-07-30 RX ADMIN — Medication 2 MILLIGRAM(S): at 06:46

## 2023-07-30 RX ADMIN — CHLORHEXIDINE GLUCONATE 1 APPLICATION(S): 213 SOLUTION TOPICAL at 06:44

## 2023-07-30 RX ADMIN — Medication 100 MILLIGRAM(S): at 06:45

## 2023-07-30 RX ADMIN — PANTOPRAZOLE SODIUM 40 MILLIGRAM(S): 20 TABLET, DELAYED RELEASE ORAL at 06:46

## 2023-07-30 RX ADMIN — Medication 15 MILLILITER(S): at 11:25

## 2023-07-30 RX ADMIN — LACOSAMIDE 200 MILLIGRAM(S): 50 TABLET ORAL at 17:12

## 2023-07-30 RX ADMIN — HEPARIN SODIUM 5000 UNIT(S): 5000 INJECTION INTRAVENOUS; SUBCUTANEOUS at 00:01

## 2023-07-30 RX ADMIN — LACOSAMIDE 200 MILLIGRAM(S): 50 TABLET ORAL at 06:46

## 2023-07-30 RX ADMIN — Medication 650 MILLIGRAM(S): at 07:32

## 2023-07-30 RX ADMIN — HEPARIN SODIUM 5000 UNIT(S): 5000 INJECTION INTRAVENOUS; SUBCUTANEOUS at 17:12

## 2023-07-30 RX ADMIN — Medication 81 MILLIGRAM(S): at 11:25

## 2023-07-30 RX ADMIN — HEPARIN SODIUM 5000 UNIT(S): 5000 INJECTION INTRAVENOUS; SUBCUTANEOUS at 07:33

## 2023-07-30 RX ADMIN — CALAMINE AND ZINC OXIDE AND PHENOL 1 APPLICATION(S): 160; 10 LOTION TOPICAL at 06:44

## 2023-07-30 RX ADMIN — Medication 650 MILLIGRAM(S): at 20:28

## 2023-07-30 RX ADMIN — Medication 650 MILLIGRAM(S): at 08:30

## 2023-07-30 RX ADMIN — HEPARIN SODIUM 5000 UNIT(S): 5000 INJECTION INTRAVENOUS; SUBCUTANEOUS at 23:01

## 2023-07-30 RX ADMIN — CALAMINE AND ZINC OXIDE AND PHENOL 1 APPLICATION(S): 160; 10 LOTION TOPICAL at 17:16

## 2023-07-30 NOTE — PROGRESS NOTE ADULT - SUBJECTIVE AND OBJECTIVE BOX
Overnight events:     INCOMPLETE!      POD#    SUBJECTIVE:      MEDICATIONS  (STANDING):  amLODIPine   Tablet 10 milliGRAM(s) Oral daily  aspirin  chewable 81 milliGRAM(s) Oral daily  calamine/zinc oxide Lotion 1 Application(s) Topical two times a day  chlorhexidine 2% Cloths 1 Application(s) Topical <User Schedule>  heparin   Injectable 5000 Unit(s) SubCutaneous every 8 hours  lacosamide 200 milliGRAM(s) Oral every 12 hours  loperamide 2 milliGRAM(s) Oral every 12 hours  metoprolol succinate  milliGRAM(s) Oral daily  multivitamin/minerals/iron Oral Solution (CENTRUM) 15 milliLiter(s) Oral daily  pantoprazole    Tablet 40 milliGRAM(s) Oral before breakfast    MEDICATIONS  (PRN):  acetaminophen     Tablet .. 650 milliGRAM(s) Oral every 6 hours PRN Mild Pain (1 - 3), Moderate Pain (4 - 6), Severe Pain (7 - 10)  benzocaine/menthol Lozenge 1 Lozenge Oral every 6 hours PRN Sore Throat  labetalol Injectable 10 milliGRAM(s) IV Push every 6 hours PRN Systolic blood pressure > 160  lidocaine   4% Patch 1 Patch Transdermal once PRN Superficial MSK Pain  ondansetron Injectable 4 milliGRAM(s) IV Push every 8 hours PRN Nausea and/or Vomiting      Vital Signs Last 24 Hrs  T(C): 36.4 (30 Jul 2023 05:44), Max: 37.1 (29 Jul 2023 09:00)  T(F): 97.5 (30 Jul 2023 05:44), Max: 98.8 (29 Jul 2023 09:00)  HR: 86 (30 Jul 2023 04:01) (72 - 94)  BP: 134/85 (30 Jul 2023 04:01) (128/81 - 157/106)  BP(mean): 105 (30 Jul 2023 04:01) (99 - 126)  RR: 17 (30 Jul 2023 04:01) (12 - 18)  SpO2: 100% (30 Jul 2023 04:01) (94% - 100%)    Parameters below as of 30 Jul 2023 04:01  Patient On (Oxygen Delivery Method): BiPAP/CPAP        Physical Exam:  General: NAD, resting comfortably in bed  Pulmonary: Nonlabored breathing, no respiratory distress  Cardiovascular: NSR  Abdominal: soft, NT/ND  Extremities: WWP, normal strength  Neuro: A/O x 3, CNs II-XII grossly intact, no focal deficits, normal motor/sensation  Pulses: palpable distal pulses    I&O's Summary    28 Jul 2023 07:01  -  29 Jul 2023 07:00  --------------------------------------------------------  IN: 1050 mL / OUT: 875 mL / NET: 175 mL    29 Jul 2023 07:01  -  30 Jul 2023 05:58  --------------------------------------------------------  IN: 450 mL / OUT: 1000 mL / NET: -550 mL        LABS:              CAPILLARY BLOOD GLUCOSE            RADIOLOGY & ADDITIONAL STUDIES:   Overnight events: No acute overnight events.     POD  5/5: second stage TEVAR  5/13: IR Celiac, SMA, splenic, and left gastric artery angiogram reveals no pseudoaneurysm or any active bleeding.  5/22:  IR Aspiration of left peripancreatic fluid collection  (15cc sanguinous) and paracentesis (4L clear yellow fluid)  5/24: IR L peripancreatic abscess collection drainage and placement of 12F drainage catheter  5/25: Rosalee upsized existing abscess drain to 16F, placed new drain to another abscess collection on left, and two drain for ascites.  5/26: Placement of two new drainage catheters and exchange of two   drainage catheters  5/30: IR LUQ drainage of 400cc purulent fluid  5/31: ex lap, wash out of ascites/old blood/scant new blood, all CODIE drain removal, CODIE x3 placement, abdomen open,   6/1: No bleeding, evac of old blood, placement of feeding J-tube, abd closure  6/5: Bedside trach (Pulm)  6/9: Bronchoscopy, lavage  7/19: IVC filter placement    SUBJECTIVE: Patient seen at bedside with chief resident. He denies any pain, nausea, or vomiting He confirms he last BM was two days ago.       MEDICATIONS  (STANDING):  amLODIPine   Tablet 10 milliGRAM(s) Oral daily  aspirin  chewable 81 milliGRAM(s) Oral daily  calamine/zinc oxide Lotion 1 Application(s) Topical two times a day  chlorhexidine 2% Cloths 1 Application(s) Topical <User Schedule>  heparin   Injectable 5000 Unit(s) SubCutaneous every 8 hours  lacosamide 200 milliGRAM(s) Oral every 12 hours  loperamide 2 milliGRAM(s) Oral every 12 hours  metoprolol succinate  milliGRAM(s) Oral daily  multivitamin/minerals/iron Oral Solution (CENTRUM) 15 milliLiter(s) Oral daily  pantoprazole    Tablet 40 milliGRAM(s) Oral before breakfast    MEDICATIONS  (PRN):  acetaminophen     Tablet .. 650 milliGRAM(s) Oral every 6 hours PRN Mild Pain (1 - 3), Moderate Pain (4 - 6), Severe Pain (7 - 10)  benzocaine/menthol Lozenge 1 Lozenge Oral every 6 hours PRN Sore Throat  labetalol Injectable 10 milliGRAM(s) IV Push every 6 hours PRN Systolic blood pressure > 160  lidocaine   4% Patch 1 Patch Transdermal once PRN Superficial MSK Pain  ondansetron Injectable 4 milliGRAM(s) IV Push every 8 hours PRN Nausea and/or Vomiting      Vital Signs Last 24 Hrs  T(C): 36.4 (30 Jul 2023 05:44), Max: 37.1 (29 Jul 2023 09:00)  T(F): 97.5 (30 Jul 2023 05:44), Max: 98.8 (29 Jul 2023 09:00)  HR: 86 (30 Jul 2023 04:01) (72 - 94)  BP: 134/85 (30 Jul 2023 04:01) (128/81 - 157/106)  BP(mean): 105 (30 Jul 2023 04:01) (99 - 126)  RR: 17 (30 Jul 2023 04:01) (12 - 18)  SpO2: 100% (30 Jul 2023 04:01) (94% - 100%)    Parameters below as of 30 Jul 2023 04:01  Patient On (Oxygen Delivery Method): BiPAP/CPAP        Physical Exam:  General: NAD, resting comfortably in bed  Pulmonary: Nonlabored breathing, no respiratory distress  Cardiovascular: NSR  Abdominal: soft, NT/ND, J tube in place   Extremities: WWP, normal strength  Neuro: A/O x 3, CNs II-XII grossly intact, no focal deficits, normal motor/sensation  Pulses: palpable distal pulses    I&O's Summary    28 Jul 2023 07:01  -  29 Jul 2023 07:00  --------------------------------------------------------  IN: 1050 mL / OUT: 875 mL / NET: 175 mL    29 Jul 2023 07:01  -  30 Jul 2023 05:58  --------------------------------------------------------  IN: 450 mL / OUT: 1000 mL / NET: -550 mL        LABS:              CAPILLARY BLOOD GLUCOSE            RADIOLOGY & ADDITIONAL STUDIES:

## 2023-07-30 NOTE — PROGRESS NOTE ADULT - ASSESSMENT
53M w PMHx of HTN, type A aortic dissection s/p Dacron grafts, AV resuspension in 2013, CAD s/p CABG x 1 SVG to RCA (5/2013 with Dr. Medina), seizure disorder, recent admission 3/20-4/14 for replacement of transverse aortic arch, second stage TEVAR and aorto-axillary bypass, AV replacement (bio 23mm), and CABG x 1 (SVG-RCA). Pt found to have endo leak and taken to OR for TEVAR revision on 5/5. Post op course c/b Klebsiella bacteremia (5/15), resp failure requiring intubation on 5/18, Mucus plugging s/p Bronch 5/19 and PEA arrest. Post operatively pt also found to have hemorrhagic pancreatitis with venu-pancreatic abscess possibly 2* to Depakote therefore s/p IR aspiration of peripancreatic fluid collection and paracentesis on 5/22, IR venu-pancreatic abscess drainage and placement of drainage catheter on 5/24. Pt then transferred from CTICU to SICU for septic shock, and further mgmt of hemorrhagic pancreatitis on 5/25. s/p IR placement of 2 new drainage catheters and catheter exchange on 5/26. LUQ drainage 5/30. OR 5/31 for exlap washout & replacement of 3 new JPs and abthera vac with open abdomen. RTOR 6/1, no bleeding, evac of old blood, placement of feeding J-tube, failed extubation due to PNA, completed ABX course and s/p trach 6/5 and decannulation 6/28. stepped down, but returned to SICU (7/2) for respiratory distress. Emergently intubated (7/3) for aspiration pneumonia, sputum cx grew pseudomonas. Extubated on 7/9. Recent CT with B/L chornic pulmonary embolisms and pneumatosis of cecum. Started on heparin gtt - stopped 2/2 to blood per rectum. Stepped up to SICU for HD monitoring on 7/18. Transferred back to SDU on 7/19 IVC filter placed on 7/19.     Soft diet  Pain/nausea control prn  ASA  SQH/SCDs  OOBA/IS   Dispo: COURT

## 2023-07-31 LAB
ANION GAP SERPL CALC-SCNC: 9 MMOL/L — SIGNIFICANT CHANGE UP (ref 5–17)
BUN SERPL-MCNC: 11 MG/DL — SIGNIFICANT CHANGE UP (ref 7–23)
CALCIUM SERPL-MCNC: 8.4 MG/DL — SIGNIFICANT CHANGE UP (ref 8.4–10.5)
CHLORIDE SERPL-SCNC: 105 MMOL/L — SIGNIFICANT CHANGE UP (ref 96–108)
CO2 SERPL-SCNC: 21 MMOL/L — LOW (ref 22–31)
CREAT SERPL-MCNC: 0.5 MG/DL — SIGNIFICANT CHANGE UP (ref 0.5–1.3)
EGFR: 121 ML/MIN/1.73M2 — SIGNIFICANT CHANGE UP
GLUCOSE SERPL-MCNC: 74 MG/DL — SIGNIFICANT CHANGE UP (ref 70–99)
HCT VFR BLD CALC: 27.5 % — LOW (ref 39–50)
HGB BLD-MCNC: 8.9 G/DL — LOW (ref 13–17)
MAGNESIUM SERPL-MCNC: 1.7 MG/DL — SIGNIFICANT CHANGE UP (ref 1.6–2.6)
MCHC RBC-ENTMCNC: 30.3 PG — SIGNIFICANT CHANGE UP (ref 27–34)
MCHC RBC-ENTMCNC: 32.4 GM/DL — SIGNIFICANT CHANGE UP (ref 32–36)
MCV RBC AUTO: 93.5 FL — SIGNIFICANT CHANGE UP (ref 80–100)
NRBC # BLD: 0 /100 WBCS — SIGNIFICANT CHANGE UP (ref 0–0)
PHOSPHATE SERPL-MCNC: 3.7 MG/DL — SIGNIFICANT CHANGE UP (ref 2.5–4.5)
PLATELET # BLD AUTO: 342 K/UL — SIGNIFICANT CHANGE UP (ref 150–400)
POTASSIUM SERPL-MCNC: 4 MMOL/L — SIGNIFICANT CHANGE UP (ref 3.5–5.3)
POTASSIUM SERPL-SCNC: 4 MMOL/L — SIGNIFICANT CHANGE UP (ref 3.5–5.3)
RBC # BLD: 2.94 M/UL — LOW (ref 4.2–5.8)
RBC # FLD: 17.5 % — HIGH (ref 10.3–14.5)
SODIUM SERPL-SCNC: 135 MMOL/L — SIGNIFICANT CHANGE UP (ref 135–145)
WBC # BLD: 6.4 K/UL — SIGNIFICANT CHANGE UP (ref 3.8–10.5)
WBC # FLD AUTO: 6.4 K/UL — SIGNIFICANT CHANGE UP (ref 3.8–10.5)

## 2023-07-31 RX ORDER — MAGNESIUM SULFATE 500 MG/ML
2 VIAL (ML) INJECTION ONCE
Refills: 0 | Status: COMPLETED | OUTPATIENT
Start: 2023-07-31 | End: 2023-07-31

## 2023-07-31 RX ADMIN — HEPARIN SODIUM 5000 UNIT(S): 5000 INJECTION INTRAVENOUS; SUBCUTANEOUS at 07:09

## 2023-07-31 RX ADMIN — Medication 25 GRAM(S): at 11:04

## 2023-07-31 RX ADMIN — LACOSAMIDE 200 MILLIGRAM(S): 50 TABLET ORAL at 18:48

## 2023-07-31 RX ADMIN — HEPARIN SODIUM 5000 UNIT(S): 5000 INJECTION INTRAVENOUS; SUBCUTANEOUS at 15:57

## 2023-07-31 RX ADMIN — PANTOPRAZOLE SODIUM 40 MILLIGRAM(S): 20 TABLET, DELAYED RELEASE ORAL at 07:08

## 2023-07-31 RX ADMIN — HEPARIN SODIUM 5000 UNIT(S): 5000 INJECTION INTRAVENOUS; SUBCUTANEOUS at 23:23

## 2023-07-31 RX ADMIN — AMLODIPINE BESYLATE 10 MILLIGRAM(S): 2.5 TABLET ORAL at 07:09

## 2023-07-31 RX ADMIN — CALAMINE AND ZINC OXIDE AND PHENOL 1 APPLICATION(S): 160; 10 LOTION TOPICAL at 07:10

## 2023-07-31 RX ADMIN — Medication 81 MILLIGRAM(S): at 11:03

## 2023-07-31 RX ADMIN — LACOSAMIDE 200 MILLIGRAM(S): 50 TABLET ORAL at 07:09

## 2023-07-31 RX ADMIN — CALAMINE AND ZINC OXIDE AND PHENOL 1 APPLICATION(S): 160; 10 LOTION TOPICAL at 18:49

## 2023-07-31 RX ADMIN — CHLORHEXIDINE GLUCONATE 1 APPLICATION(S): 213 SOLUTION TOPICAL at 07:10

## 2023-07-31 RX ADMIN — Medication 100 MILLIGRAM(S): at 07:09

## 2023-07-31 RX ADMIN — Medication 15 MILLILITER(S): at 11:17

## 2023-07-31 NOTE — PROGRESS NOTE ADULT - SUBJECTIVE AND OBJECTIVE BOX
SUBJECTIVE: Pt seen and examined at bedside with chief. Pt denies any complaints. Pain well controlled. Tolerating diet without N/V. Admits to BF       MEDICATIONS  (STANDING):  amLODIPine   Tablet 10 milliGRAM(s) Oral daily  aspirin  chewable 81 milliGRAM(s) Oral daily  calamine/zinc oxide Lotion 1 Application(s) Topical two times a day  chlorhexidine 2% Cloths 1 Application(s) Topical <User Schedule>  heparin   Injectable 5000 Unit(s) SubCutaneous every 8 hours  lacosamide 200 milliGRAM(s) Oral every 12 hours  metoprolol succinate  milliGRAM(s) Oral daily  multivitamin/minerals/iron Oral Solution (CENTRUM) 15 milliLiter(s) Oral daily  pantoprazole    Tablet 40 milliGRAM(s) Oral before breakfast    MEDICATIONS  (PRN):  acetaminophen     Tablet .. 650 milliGRAM(s) Oral every 6 hours PRN Mild Pain (1 - 3), Moderate Pain (4 - 6), Severe Pain (7 - 10)  benzocaine/menthol Lozenge 1 Lozenge Oral every 6 hours PRN Sore Throat  labetalol Injectable 10 milliGRAM(s) IV Push every 6 hours PRN Systolic blood pressure > 160  lidocaine   4% Patch 1 Patch Transdermal once PRN Superficial MSK Pain  ondansetron Injectable 4 milliGRAM(s) IV Push every 8 hours PRN Nausea and/or Vomiting      Vital Signs Last 24 Hrs  T(C): 37 (31 Jul 2023 04:58), Max: 37.2 (30 Jul 2023 10:00)  T(F): 98.6 (31 Jul 2023 04:58), Max: 99 (30 Jul 2023 10:00)  HR: 92 (31 Jul 2023 08:30) (83 - 94)  BP: 136/88 (31 Jul 2023 08:30) (133/88 - 146/92)  BP(mean): 107 (31 Jul 2023 08:30) (102 - 114)  RR: 17 (31 Jul 2023 08:30) (14 - 18)  SpO2: 99% (31 Jul 2023 08:30) (95% - 100%)    Parameters below as of 31 Jul 2023 08:30  Patient On (Oxygen Delivery Method): room air        PHYSICAL EXAM:      Constitutional: A&Ox3    Respiratory: non labored breathing, no respiratory distress    Cardiovascular: NSR, RRR    Gastrointestinal: Soft ND, NT                 Incision: Healing well    Genitourinary: Voiding     Extremities: (-) edema                  I&O's Detail    30 Jul 2023 07:01  -  31 Jul 2023 07:00  --------------------------------------------------------  IN:    Oral Fluid: 250 mL  Total IN: 250 mL    OUT:    Voided (mL): 1000 mL  Total OUT: 1000 mL    Total NET: -750 mL      31 Jul 2023 07:01  -  31 Jul 2023 09:20  --------------------------------------------------------  IN:  Total IN: 0 mL    OUT:    Voided (mL): 600 mL  Total OUT: 600 mL    Total NET: -600 mL          LABS:                RADIOLOGY & ADDITIONAL STUDIES:

## 2023-07-31 NOTE — PROGRESS NOTE ADULT - ASSESSMENT
A/p: 53M w PMHx of HTN, type A aortic dissection s/p Dacron grafts, AV resuspension in 2013, CAD s/p CABG x 1 SVG to RCA (5/2013 with Dr. Medina), seizure disorder, recent admission 3/20-4/14 for replacement of transverse aortic arch, second stage TEVAR and aorto-axillary bypass, AV replacement (bio 23mm), and CABG x 1 (SVG-RCA). Pt found to have endo leak and taken to OR for TEVAR revision on 5/5. Post op course c/b Klebsiella bacteremia (5/15), resp failure requiring intubation on 5/18, Mucus plugging s/p Bronch 5/19 and PEA arrest. Post operatively pt also found to have hemorrhagic pancreatitis with venu-pancreatic abscess possibly 2* to Depakote therefore s/p IR aspiration of peripancreatic fluid collection and paracentesis on 5/22, IR venu-pancreatic abscess drainage and placement of drainage catheter on 5/24. Pt then transferred from CTICU to SICU for septic shock, and further mgmt of hemorrhagic pancreatitis on 5/25. s/p IR placement of 2 new drainage catheters and catheter exchange on 5/26. LUQ drainage 5/30. OR 5/31 for exlap washout & replacement of 3 new JPs and abthera vac with open abdomen. RTOR 6/1, no bleeding, evac of old blood, placement of feeding J-tube, failed extubation due to PNA, completed ABX course and s/p trach 6/5 and decannulation 6/28. stepped down, but returned to SICU (7/2) for respiratory distress. Emergently intubated (7/3) for aspiration pneumonia, sputum cx grew pseudomonas. Extubated on 7/9. Recent CT with B/L chornic pulmonary embolisms and pneumatosis of cecum. Started on heparin gtt - stopped 2/2 to blood per rectum. Stepped up to SICU for HD monitoring on 7/18. Transferred back to SDU on 7/19 IVC filter placed on 7/19.     d/c pending to COURT  Soft diet  Pain/nausea control prn  ASA  SQH/SCDs  OOBA/IS   Dispo: COURT

## 2023-08-01 RX ORDER — MULTIVIT-MIN/FERROUS GLUCONATE 9 MG/15 ML
15 LIQUID (ML) ORAL DAILY
Refills: 0 | Status: DISCONTINUED | OUTPATIENT
Start: 2023-08-01 | End: 2023-08-03

## 2023-08-01 RX ADMIN — Medication 15 MILLILITER(S): at 12:45

## 2023-08-01 RX ADMIN — Medication 100 MILLIGRAM(S): at 05:39

## 2023-08-01 RX ADMIN — LACOSAMIDE 200 MILLIGRAM(S): 50 TABLET ORAL at 18:06

## 2023-08-01 RX ADMIN — CALAMINE AND ZINC OXIDE AND PHENOL 1 APPLICATION(S): 160; 10 LOTION TOPICAL at 05:40

## 2023-08-01 RX ADMIN — HEPARIN SODIUM 5000 UNIT(S): 5000 INJECTION INTRAVENOUS; SUBCUTANEOUS at 16:18

## 2023-08-01 RX ADMIN — PANTOPRAZOLE SODIUM 40 MILLIGRAM(S): 20 TABLET, DELAYED RELEASE ORAL at 05:39

## 2023-08-01 RX ADMIN — AMLODIPINE BESYLATE 10 MILLIGRAM(S): 2.5 TABLET ORAL at 05:39

## 2023-08-01 RX ADMIN — LACOSAMIDE 200 MILLIGRAM(S): 50 TABLET ORAL at 05:40

## 2023-08-01 RX ADMIN — Medication 81 MILLIGRAM(S): at 12:45

## 2023-08-01 RX ADMIN — CHLORHEXIDINE GLUCONATE 1 APPLICATION(S): 213 SOLUTION TOPICAL at 05:39

## 2023-08-01 RX ADMIN — CALAMINE AND ZINC OXIDE AND PHENOL 1 APPLICATION(S): 160; 10 LOTION TOPICAL at 18:06

## 2023-08-01 RX ADMIN — HEPARIN SODIUM 5000 UNIT(S): 5000 INJECTION INTRAVENOUS; SUBCUTANEOUS at 06:54

## 2023-08-01 RX ADMIN — Medication 650 MILLIGRAM(S): at 19:10

## 2023-08-01 RX ADMIN — Medication 650 MILLIGRAM(S): at 18:07

## 2023-08-01 NOTE — CHART NOTE - NSCHARTNOTEFT_GEN_A_CORE
Admitting Diagnosis:   Patient is a 53y old  Male who presents with a chief complaint of endoleak, abdominal pain (01 Aug 2023 06:18)  PAST MEDICAL & SURGICAL HISTORY:  HTN (hypertension)  Aortic dissection  CAD (coronary artery disease)  Seizure disorder  Seizure disorder  Hypertension  Status post endovascular aneurysm repair (EVAR)  S/P aortic bifurcation bypass graft  S/P CABG x 1    Current Nutrition Order: minced and moist diet with Ensure oral nutrition supplement 2x/day (700kcals, 40g protein]    PO Intake: Good (%) [   ]  Fair (50-75%) [ X ] Poor (<25%) [   ] - per pt report    GI Issues: denies nausea, vomiting, diarrhea, constipation; noted with a BM yesterday per pt (formed BM); no distention noted    Pain: denies pain/discomfort    Skin Integrity: sx incision to abdomen, stage II pressure ulcer to L scapula, unstageable sacral pressure ulcer; no edema noted; Sandro: 14    Labs:   07-31    135  |  105  |  11  ----------------------------<  74  4.0   |  21<L>  |  0.50    Ca    8.4      31 Jul 2023 10:00  Phos  3.7     07-31  Mg     1.7     07-31    CAPILLARY BLOOD GLUCOSE    Medications:  MEDICATIONS  (STANDING):  amLODIPine   Tablet 10 milliGRAM(s) Oral daily  aspirin  chewable 81 milliGRAM(s) Oral daily  calamine/zinc oxide Lotion 1 Application(s) Topical two times a day  chlorhexidine 2% Cloths 1 Application(s) Topical <User Schedule>  heparin   Injectable 5000 Unit(s) SubCutaneous every 8 hours  lacosamide 200 milliGRAM(s) Oral every 12 hours  metoprolol succinate  milliGRAM(s) Oral daily  multivitamin/minerals/iron Oral Solution (CENTRUM) 15 milliLiter(s) Oral daily  pantoprazole    Tablet 40 milliGRAM(s) Oral before breakfast    MEDICATIONS  (PRN):  acetaminophen     Tablet .. 650 milliGRAM(s) Oral every 6 hours PRN Mild Pain (1 - 3), Moderate Pain (4 - 6), Severe Pain (7 - 10)  benzocaine/menthol Lozenge 1 Lozenge Oral every 6 hours PRN Sore Throat  labetalol Injectable 10 milliGRAM(s) IV Push every 6 hours PRN Systolic blood pressure > 160  lidocaine   4% Patch 1 Patch Transdermal once PRN Superficial MSK Pain  ondansetron Injectable 4 milliGRAM(s) IV Push every 8 hours PRN Nausea and/or Vomiting    Dosing Anthropometrics:   Height for BMI (FEET)	6 Feet  Height for BMI (INCHES)	0 Inch(s)  Height for BMI (CENTIMETERS)	182.88 Centimeter(s)  Weight for BMI (lbs)	154 lb  Weight for BMI (kg)	69.9 kg  Body Mass Index	20.8    Weight Change: no new wts to review at this time, last wt taken on 7/4. Please obtain current wt and then biweekly wts to monitor trends and better assess nutrition adequacy     Estimated energy needs:   Ideal body weight (80.9kg) used for calculations as pt >100% of IBW, BMI <30 per [facility] Standards of Care. Needs estimated for age and adjusted for current clinical status     Calories: 2022.5-2427 kcals based on 25-30 kcals/kg  Protein: 113.3-129.4g protein based on 1.4-1.6g protein/kg  Fluids: 2022.5-2427ml based on 25-30ml/kg    Subjective:   53M w PMHx of HTN, type A aortic dissection s/p Dacron grafts, AV resuspension in 2013, CAD s/p CABG x 1 SVG to RCA (5/2013 with Dr. Medina), seizure disorder, recent admission 3/20-4/14 for replacement of transverse aortic arch, second stage TEVAR and aorto-axillary bypass, AV replacement (bio 23mm), and CABG x 1 (SVG-RCA). Pt found to have endo leak and taken to OR for TEVAR revision on 5/5. Post op course c/b Klebsiella bacteremia (5/15), resp failure requiring intubation on 5/18, Mucus plugging s/p Bronch 5/19 and PEA arrest. Post operatively pt also found to have hemorrhagic pancreatitis with venu-pancreatic abscess possibly 2* to Depakote therefore s/p IR aspiration of peripancreatic fluid collection and paracentesis on 5/22, IR venu-pancreatic abscess drainage and placement of drainage catheter on 5/24. Pt then transferred from CTICU to SICU for septic shock, and further mgmt of hemorrhagic pancreatitis on 5/25. s/p IR placement of 2 new drainage catheters and catheter exchange on 5/26. LUQ drainage 5/30. OR 5/31 for exlap washout & replacement of 3 new JPs and abthera vac with open abdomen. RTOR 6/1, no bleeding, evac of old blood, placement of feeding J-tube, failed extubation due to PNA, completed ABX course and s/p trach 6/5 and decannulation 6/28. stepped down, but returned to SICU (7/2) for respiratory distress. Emergently intubated (7/3) for aspiration pneumonia, sputum cx grew pseudomonas. Extubated on 7/9. FEES 7/12: minced moist with thin. 7/14: SDU from SICU. 7/25: Pending COURT     RD visited pt at bedside this am on 9 Uris. On assessment pt is awake and alert. Pt stated his appetite has been slightly improving. Pt reports continuing with fair intake, tolerates 50-75% meal tray. Reports consuming ~100% oral nutrition supplement regimen, enjoys the Ensure supplements. Thus, current diet order remains appropriate. No GI upset noted per pt, formed BM noted. Most recent nutrition-related labs are unremarkable. No further nutrition-related concerns at this time. RD to follow up prn. Please view full recommendations below.     Previous Nutrition Diagnosis: Increased nutrients related to increased demands as evidenced by clinical course, pressure ulcers    Active [ X ]  Resolved [   ]    Goal: Pt will meet at least 75% of protein & energy needs via most appropriate route for nutrition     Recommendations:  1. recommend to continue with current diet order  - honor food preferences as able  - encouragement at meal times for adequate PO intake  2. continue with Ensure Enlive 2x/day [700 kcals, 40g protein]  3. Consider 1 pack of Peter BID; provides 160 kcals, 14g arginine, 14g glutamine, 5g collagen   - to promote wound healing  4. GI  - antiemetic/prokinetic agent prn to promote tolerance  - imodium as needed  5. Monitor chemistry, GI function, skin integrity  6. To monitor clinical course and adjust recommendations as needed    Education: encouraged adequate PO intake in-house with focus on nutrient dense foods     Risk Level: High [   ] Moderate [ x ] Low [   ]. Admitting Diagnosis:   Patient is a 53y old  Male who presents with a chief complaint of endoleak, abdominal pain (01 Aug 2023 06:18)  PAST MEDICAL & SURGICAL HISTORY:  HTN (hypertension)  Aortic dissection  CAD (coronary artery disease)  Seizure disorder  Seizure disorder  Hypertension  Status post endovascular aneurysm repair (EVAR)  S/P aortic bifurcation bypass graft  S/P CABG x 1    Current Nutrition Order: minced and moist diet with Ensure oral nutrition supplement 2x/day (700kcals, 40g protein]    PO Intake: Good (%) [   ]  Fair (50-75%) [ X ] Poor (<25%) [   ] - per pt report    GI Issues: denies nausea, vomiting, diarrhea, constipation; noted with a BM yesterday per pt (formed BM); no distention noted    Pain: denies pain/discomfort    Skin Integrity: sx incision to abdomen, stage II pressure ulcer to L scapula, unstageable sacral pressure ulcer; no edema noted; Sandro: 14    Labs:   07-31    135  |  105  |  11  ----------------------------<  74  4.0   |  21<L>  |  0.50    Ca    8.4      31 Jul 2023 10:00  Phos  3.7     07-31  Mg     1.7     07-31    CAPILLARY BLOOD GLUCOSE    Medications:  MEDICATIONS  (STANDING):  amLODIPine   Tablet 10 milliGRAM(s) Oral daily  aspirin  chewable 81 milliGRAM(s) Oral daily  calamine/zinc oxide Lotion 1 Application(s) Topical two times a day  chlorhexidine 2% Cloths 1 Application(s) Topical <User Schedule>  heparin   Injectable 5000 Unit(s) SubCutaneous every 8 hours  lacosamide 200 milliGRAM(s) Oral every 12 hours  metoprolol succinate  milliGRAM(s) Oral daily  multivitamin/minerals/iron Oral Solution (CENTRUM) 15 milliLiter(s) Oral daily  pantoprazole    Tablet 40 milliGRAM(s) Oral before breakfast    MEDICATIONS  (PRN):  acetaminophen     Tablet .. 650 milliGRAM(s) Oral every 6 hours PRN Mild Pain (1 - 3), Moderate Pain (4 - 6), Severe Pain (7 - 10)  benzocaine/menthol Lozenge 1 Lozenge Oral every 6 hours PRN Sore Throat  labetalol Injectable 10 milliGRAM(s) IV Push every 6 hours PRN Systolic blood pressure > 160  lidocaine   4% Patch 1 Patch Transdermal once PRN Superficial MSK Pain  ondansetron Injectable 4 milliGRAM(s) IV Push every 8 hours PRN Nausea and/or Vomiting    Dosing Anthropometrics:   Height for BMI (FEET)	6 Feet  Height for BMI (INCHES)	0 Inch(s)  Height for BMI (CENTIMETERS)	182.88 Centimeter(s)  Weight for BMI (lbs)	154 lb  Weight for BMI (kg)	69.9 kg  Body Mass Index	20.8    Weight Change: no new wts to review at this time, last wt taken on 7/4. Please obtain current wt and then biweekly wts to monitor trends and better assess nutrition adequacy     Estimated energy needs:   Ideal body weight (80.9kg) used for calculations as pt >100% of IBW, BMI <30 per [facility] Standards of Care. Needs estimated for age and adjusted for current clinical status     Calories: 2022.5-2427 kcals based on 25-30 kcals/kg  Protein: 113.3-129.4g protein based on 1.4-1.6g protein/kg  Fluids: 2022.5-2427ml based on 25-30ml/kg    Subjective:   53M w PMHx of HTN, type A aortic dissection s/p Dacron grafts, AV resuspension in 2013, CAD s/p CABG x 1 SVG to RCA (5/2013 with Dr. Medina), seizure disorder, recent admission 3/20-4/14 for replacement of transverse aortic arch, second stage TEVAR and aorto-axillary bypass, AV replacement (bio 23mm), and CABG x 1 (SVG-RCA). Pt found to have endo leak and taken to OR for TEVAR revision on 5/5. Post op course c/b Klebsiella bacteremia (5/15), resp failure requiring intubation on 5/18, Mucus plugging s/p Bronch 5/19 and PEA arrest. Post operatively pt also found to have hemorrhagic pancreatitis with venu-pancreatic abscess possibly 2* to Depakote therefore s/p IR aspiration of peripancreatic fluid collection and paracentesis on 5/22, IR venu-pancreatic abscess drainage and placement of drainage catheter on 5/24. Pt then transferred from CTICU to SICU for septic shock, and further mgmt of hemorrhagic pancreatitis on 5/25. s/p IR placement of 2 new drainage catheters and catheter exchange on 5/26. LUQ drainage 5/30. OR 5/31 for exlap washout & replacement of 3 new JPs and abthera vac with open abdomen. RTOR 6/1, no bleeding, evac of old blood, placement of feeding J-tube, failed extubation due to PNA, completed ABX course and s/p trach 6/5 and decannulation 6/28. stepped down, but returned to SICU (7/2) for respiratory distress. Emergently intubated (7/3) for aspiration pneumonia, sputum cx grew pseudomonas. Extubated on 7/9. FEES 7/12: minced moist with thin. 7/14: SDU from SICU. 7/25: Pending COURT. 7/30/23: dc'd loperamide.     RD visited pt at bedside this am on 9 Uris. On assessment pt is awake and alert. Pt stated his appetite has been slightly improving. Pt reports continuing with fair intake, tolerates 50-75% meal tray. Reports consuming ~100% oral nutrition supplement regimen, enjoys the Ensure supplements. Thus, current diet order remains appropriate. No GI upset noted per pt, formed BM noted. Most recent nutrition-related labs are unremarkable. No further nutrition-related concerns at this time. RD to follow up prn. Please view full recommendations below.     Previous Nutrition Diagnosis: Increased nutrients related to increased demands as evidenced by clinical course, pressure ulcers    Active [ X ]  Resolved [   ]    Goal: Pt will meet at least 75% of protein & energy needs via most appropriate route for nutrition     Recommendations:  1. recommend to continue with current diet order  - honor food preferences as able  - encouragement at meal times for adequate PO intake  2. continue with Ensure Enlive 2x/day [700 kcals, 40g protein]  3. Consider 1 pack of Peter BID; provides 160 kcals, 14g arginine, 14g glutamine, 5g collagen   - to promote wound healing  4. GI  - antiemetic/prokinetic agent prn to promote tolerance  - imodium as needed  5. Monitor chemistry, GI function, skin integrity  6. To monitor clinical course and adjust recommendations as needed    Education: encouraged adequate PO intake in-house with focus on nutrient dense foods     Risk Level: High [   ] Moderate [ x ] Low [   ].

## 2023-08-01 NOTE — PROGRESS NOTE ADULT - ASSESSMENT
A/p: 53M w PMHx of HTN, type A aortic dissection s/p Dacron grafts, AV resuspension in 2013, CAD s/p CABG x 1 SVG to RCA (5/2013 with Dr. Medina), seizure disorder, recent admission 3/20-4/14 for replacement of transverse aortic arch, second stage TEVAR and aorto-axillary bypass, AV replacement (bio 23mm), and CABG x 1 (SVG-RCA). Pt found to have endo leak and taken to OR for TEVAR revision on 5/5. Post op course c/b Klebsiella bacteremia (5/15), resp failure requiring intubation on 5/18, Mucus plugging s/p Bronch 5/19 and PEA arrest. Post operatively pt also found to have hemorrhagic pancreatitis with venu-pancreatic abscess possibly 2* to Depakote therefore s/p IR aspiration of peripancreatic fluid collection and paracentesis on 5/22, IR venu-pancreatic abscess drainage and placement of drainage catheter on 5/24. Pt then transferred from CTICU to SICU for septic shock, and further mgmt of hemorrhagic pancreatitis on 5/25. s/p IR placement of 2 new drainage catheters and catheter exchange on 5/26. LUQ drainage 5/30. OR 5/31 for exlap washout & replacement of 3 new JPs and abthera vac with open abdomen. RTOR 6/1, no bleeding, evac of old blood, placement of feeding J-tube, failed extubation due to PNA, completed ABX course and s/p trach 6/5 and decannulation 6/28. stepped down, but returned to SICU (7/2) for respiratory distress. Emergently intubated (7/3) for aspiration pneumonia, sputum cx grew pseudomonas. Extubated on 7/9. Recent CT with B/L chornic pulmonary embolisms and pneumatosis of cecum. Started on heparin gtt - stopped 2/2 to blood per rectum. Stepped up to SICU for HD monitoring on 7/18. Transferred back to SDU on 7/19 IVC filter placed on 7/19.     d/c pending to COURT, 24 hour notice given yesterday  Soft diet  Pain/nausea control prn  ASA  SQH/SCDs  OOBA/IS   Dispo: COURT

## 2023-08-01 NOTE — CHART NOTE - NSCHARTNOTESELECT_GEN_ALL_CORE
Addendum/Event Note
Anti-infective approval/Event Note
CT scan reading/Event Note
Nutrition Follow Up/Nutrition Services
Nutrition Services
Pigtail removed/Event Note
anti-infective approval/Event Note
Anti-infective Approval Note/Event Note
Epilepsy/Event Note
Event Note
Follow Up/Nutrition Services
Intubation/Event Note
Nutrition Services
POCUS/Event Note
Preop
Serial Abdominal Exam/Event Note
U/S chest/Event Note
decannulation- SICU/Event Note

## 2023-08-01 NOTE — PROGRESS NOTE ADULT - SUBJECTIVE AND OBJECTIVE BOX
SUBJECTIVE: Pt seen and examined at bedside with chief. Pt denies any complaints. Pain well controlled. Tolerating diet without N/V. Admits to BF     MEDICATIONS  (STANDING):  amLODIPine   Tablet 10 milliGRAM(s) Oral daily  aspirin  chewable 81 milliGRAM(s) Oral daily  calamine/zinc oxide Lotion 1 Application(s) Topical two times a day  chlorhexidine 2% Cloths 1 Application(s) Topical <User Schedule>  heparin   Injectable 5000 Unit(s) SubCutaneous every 8 hours  lacosamide 200 milliGRAM(s) Oral every 12 hours  metoprolol succinate  milliGRAM(s) Oral daily  multivitamin/minerals/iron Oral Solution (CENTRUM) 15 milliLiter(s) Oral daily  pantoprazole    Tablet 40 milliGRAM(s) Oral before breakfast    MEDICATIONS  (PRN):  acetaminophen     Tablet .. 650 milliGRAM(s) Oral every 6 hours PRN Mild Pain (1 - 3), Moderate Pain (4 - 6), Severe Pain (7 - 10)  benzocaine/menthol Lozenge 1 Lozenge Oral every 6 hours PRN Sore Throat  labetalol Injectable 10 milliGRAM(s) IV Push every 6 hours PRN Systolic blood pressure > 160  lidocaine   4% Patch 1 Patch Transdermal once PRN Superficial MSK Pain  ondansetron Injectable 4 milliGRAM(s) IV Push every 8 hours PRN Nausea and/or Vomiting      Vital Signs Last 24 Hrs  T(C): 36.9 (01 Aug 2023 05:11), Max: 37.3 (31 Jul 2023 09:26)  T(F): 98.4 (01 Aug 2023 05:11), Max: 99.1 (31 Jul 2023 09:26)  HR: 87 (01 Aug 2023 05:11) (87 - 94)  BP: 145/88 (01 Aug 2023 05:11) (133/83 - 145/88)  BP(mean): 107 (31 Jul 2023 08:30) (107 - 107)  RR: 17 (01 Aug 2023 05:11) (16 - 17)  SpO2: 94% (01 Aug 2023 05:11) (94% - 100%)    Parameters below as of 01 Aug 2023 05:11  Patient On (Oxygen Delivery Method): room air        PHYSICAL EXAM:      Constitutional: A&Ox3    Respiratory: non labored breathing, no respiratory distress    Cardiovascular: NSR, RRR    Gastrointestinal: Soft ND, NT                 Incision: Healing well    Genitourinary: Voiding     Extremities: (-) edema                  I&O's Detail    30 Jul 2023 07:01  -  31 Jul 2023 07:00  --------------------------------------------------------  IN:    Oral Fluid: 250 mL  Total IN: 250 mL    OUT:    Voided (mL): 1000 mL  Total OUT: 1000 mL    Total NET: -750 mL      31 Jul 2023 07:01  -  01 Aug 2023 06:18  --------------------------------------------------------  IN:  Total IN: 0 mL    OUT:    Voided (mL): 2175 mL  Total OUT: 2175 mL    Total NET: -2175 mL          LABS:                        8.9    6.40  )-----------( 342      ( 31 Jul 2023 10:00 )             27.5     07-31    135  |  105  |  11  ----------------------------<  74  4.0   |  21<L>  |  0.50    Ca    8.4      31 Jul 2023 10:00  Phos  3.7     07-31  Mg     1.7     07-31        Urinalysis Basic - ( 31 Jul 2023 10:00 )    Color: x / Appearance: x / SG: x / pH: x  Gluc: 74 mg/dL / Ketone: x  / Bili: x / Urobili: x   Blood: x / Protein: x / Nitrite: x   Leuk Esterase: x / RBC: x / WBC x   Sq Epi: x / Non Sq Epi: x / Bacteria: x        RADIOLOGY & ADDITIONAL STUDIES:

## 2023-08-02 RX ORDER — CALAMINE AND ZINC OXIDE AND PHENOL 160; 10 MG/ML; MG/ML
1 LOTION TOPICAL
Qty: 1 | Refills: 0
Start: 2023-08-02 | End: 2023-08-16

## 2023-08-02 RX ORDER — LACOSAMIDE 50 MG/1
1 TABLET ORAL
Qty: 60 | Refills: 0
Start: 2023-08-02 | End: 2023-08-31

## 2023-08-02 RX ORDER — AMLODIPINE BESYLATE 2.5 MG/1
1 TABLET ORAL
Qty: 30 | Refills: 0
Start: 2023-08-02 | End: 2023-08-31

## 2023-08-02 RX ADMIN — Medication 100 MILLIGRAM(S): at 06:15

## 2023-08-02 RX ADMIN — CALAMINE AND ZINC OXIDE AND PHENOL 1 APPLICATION(S): 160; 10 LOTION TOPICAL at 17:49

## 2023-08-02 RX ADMIN — Medication 81 MILLIGRAM(S): at 12:22

## 2023-08-02 RX ADMIN — CHLORHEXIDINE GLUCONATE 1 APPLICATION(S): 213 SOLUTION TOPICAL at 06:16

## 2023-08-02 RX ADMIN — LACOSAMIDE 200 MILLIGRAM(S): 50 TABLET ORAL at 17:49

## 2023-08-02 RX ADMIN — HEPARIN SODIUM 5000 UNIT(S): 5000 INJECTION INTRAVENOUS; SUBCUTANEOUS at 00:29

## 2023-08-02 RX ADMIN — HEPARIN SODIUM 5000 UNIT(S): 5000 INJECTION INTRAVENOUS; SUBCUTANEOUS at 08:13

## 2023-08-02 RX ADMIN — HEPARIN SODIUM 5000 UNIT(S): 5000 INJECTION INTRAVENOUS; SUBCUTANEOUS at 17:49

## 2023-08-02 RX ADMIN — LACOSAMIDE 200 MILLIGRAM(S): 50 TABLET ORAL at 06:16

## 2023-08-02 RX ADMIN — CALAMINE AND ZINC OXIDE AND PHENOL 1 APPLICATION(S): 160; 10 LOTION TOPICAL at 06:16

## 2023-08-02 RX ADMIN — Medication 15 MILLILITER(S): at 12:22

## 2023-08-02 RX ADMIN — AMLODIPINE BESYLATE 10 MILLIGRAM(S): 2.5 TABLET ORAL at 06:15

## 2023-08-02 RX ADMIN — PANTOPRAZOLE SODIUM 40 MILLIGRAM(S): 20 TABLET, DELAYED RELEASE ORAL at 06:16

## 2023-08-02 NOTE — PROGRESS NOTE ADULT - SUBJECTIVE AND OBJECTIVE BOX
SUBJECTIVE: Pt seen and examined at bedside with chief. Pt denies any complaints. Pain well controlled. Tolerating diet without N/V. Admits to BF       MEDICATIONS  (STANDING):  amLODIPine   Tablet 10 milliGRAM(s) Oral daily  aspirin  chewable 81 milliGRAM(s) Oral daily  calamine/zinc oxide Lotion 1 Application(s) Topical two times a day  chlorhexidine 2% Cloths 1 Application(s) Topical <User Schedule>  heparin   Injectable 5000 Unit(s) SubCutaneous every 8 hours  lacosamide 200 milliGRAM(s) Oral every 12 hours  metoprolol succinate  milliGRAM(s) Oral daily  multivitamin/minerals/iron Oral Solution (CENTRUM) 15 milliLiter(s) Oral daily  pantoprazole    Tablet 40 milliGRAM(s) Oral before breakfast    MEDICATIONS  (PRN):  acetaminophen     Tablet .. 650 milliGRAM(s) Oral every 6 hours PRN Mild Pain (1 - 3), Moderate Pain (4 - 6), Severe Pain (7 - 10)  benzocaine/menthol Lozenge 1 Lozenge Oral every 6 hours PRN Sore Throat  labetalol Injectable 10 milliGRAM(s) IV Push every 6 hours PRN Systolic blood pressure > 160  lidocaine   4% Patch 1 Patch Transdermal once PRN Superficial MSK Pain  ondansetron Injectable 4 milliGRAM(s) IV Push every 8 hours PRN Nausea and/or Vomiting      Vital Signs Last 24 Hrs  T(C): 36.5 (02 Aug 2023 05:30), Max: 37.1 (01 Aug 2023 08:48)  T(F): 97.7 (02 Aug 2023 05:30), Max: 98.8 (01 Aug 2023 08:48)  HR: 94 (02 Aug 2023 05:30) (79 - 94)  BP: 121/79 (02 Aug 2023 05:30) (121/79 - 137/86)  BP(mean): --  RR: 17 (02 Aug 2023 05:30) (17 - 18)  SpO2: 95% (02 Aug 2023 05:30) (95% - 99%)    Parameters below as of 02 Aug 2023 05:30  Patient On (Oxygen Delivery Method): room air        PHYSICAL EXAM:      Constitutional: A&Ox3    Respiratory: non labored breathing, no respiratory distress    Cardiovascular: NSR, RRR    Gastrointestinal: Soft ND, NT                 Incision: healing well    Genitourinary: Voiding     Extremities: (-) edema                  I&O's Detail    01 Aug 2023 07:01  -  02 Aug 2023 07:00  --------------------------------------------------------  IN:    Oral Fluid: 720 mL  Total IN: 720 mL    OUT:    Voided (mL): 1025 mL  Total OUT: 1025 mL    Total NET: -305 mL          LABS:                        8.9    6.40  )-----------( 342      ( 31 Jul 2023 10:00 )             27.5     07-31    135  |  105  |  11  ----------------------------<  74  4.0   |  21<L>  |  0.50    Ca    8.4      31 Jul 2023 10:00  Phos  3.7     07-31  Mg     1.7     07-31        Urinalysis Basic - ( 31 Jul 2023 10:00 )    Color: x / Appearance: x / SG: x / pH: x  Gluc: 74 mg/dL / Ketone: x  / Bili: x / Urobili: x   Blood: x / Protein: x / Nitrite: x   Leuk Esterase: x / RBC: x / WBC x   Sq Epi: x / Non Sq Epi: x / Bacteria: x        RADIOLOGY & ADDITIONAL STUDIES:

## 2023-08-02 NOTE — PROGRESS NOTE ADULT - ASSESSMENT
A/p: 53M w PMHx of HTN, type A aortic dissection s/p Dacron grafts, AV resuspension in 2013, CAD s/p CABG x 1 SVG to RCA (5/2013 with Dr. Medina), seizure disorder, recent admission 3/20-4/14 for replacement of transverse aortic arch, second stage TEVAR and aorto-axillary bypass, AV replacement (bio 23mm), and CABG x 1 (SVG-RCA). Pt found to have endo leak and taken to OR for TEVAR revision on 5/5. Post op course c/b Klebsiella bacteremia (5/15), resp failure requiring intubation on 5/18, Mucus plugging s/p Bronch 5/19 and PEA arrest. Post operatively pt also found to have hemorrhagic pancreatitis with venu-pancreatic abscess possibly 2* to Depakote therefore s/p IR aspiration of peripancreatic fluid collection and paracentesis on 5/22, IR venu-pancreatic abscess drainage and placement of drainage catheter on 5/24. Pt then transferred from CTICU to SICU for septic shock, and further mgmt of hemorrhagic pancreatitis on 5/25. s/p IR placement of 2 new drainage catheters and catheter exchange on 5/26. LUQ drainage 5/30. OR 5/31 for exlap washout & replacement of 3 new JPs and abthera vac with open abdomen. RTOR 6/1, no bleeding, evac of old blood, placement of feeding J-tube, failed extubation due to PNA, completed ABX course and s/p trach 6/5 and decannulation 6/28. stepped down, but returned to SICU (7/2) for respiratory distress. Emergently intubated (7/3) for aspiration pneumonia, sputum cx grew pseudomonas. Extubated on 7/9. Recent CT with B/L chornic pulmonary embolisms and pneumatosis of cecum. Started on heparin gtt - stopped 2/2 to blood per rectum. Stepped up to SICU for HD monitoring on 7/18. Transferred back to SDU on 7/19 IVC filter placed on 7/19.     d/c home, refusing COURT  Soft diet  Pain/nausea control prn  ASA  SQH/SCDs  OOBA/IS

## 2023-08-03 ENCOUNTER — TRANSCRIPTION ENCOUNTER (OUTPATIENT)
Age: 53
End: 2023-08-03

## 2023-08-03 VITALS
HEART RATE: 91 BPM | TEMPERATURE: 99 F | SYSTOLIC BLOOD PRESSURE: 147 MMHG | DIASTOLIC BLOOD PRESSURE: 93 MMHG | OXYGEN SATURATION: 100 % | RESPIRATION RATE: 17 BRPM

## 2023-08-03 PROCEDURE — 87205 SMEAR GRAM STAIN: CPT

## 2023-08-03 PROCEDURE — 93308 TTE F-UP OR LMTD: CPT

## 2023-08-03 PROCEDURE — 83880 ASSAY OF NATRIURETIC PEPTIDE: CPT

## 2023-08-03 PROCEDURE — 82247 BILIRUBIN TOTAL: CPT

## 2023-08-03 PROCEDURE — 76705 ECHO EXAM OF ABDOMEN: CPT

## 2023-08-03 PROCEDURE — 84443 ASSAY THYROID STIM HORMONE: CPT

## 2023-08-03 PROCEDURE — 83615 LACTATE (LD) (LDH) ENZYME: CPT

## 2023-08-03 PROCEDURE — 80053 COMPREHEN METABOLIC PANEL: CPT

## 2023-08-03 PROCEDURE — 71260 CT THORAX DX C+: CPT

## 2023-08-03 PROCEDURE — 49406 IMAGE CATH FLUID PERI/RETRO: CPT

## 2023-08-03 PROCEDURE — C1894: CPT

## 2023-08-03 PROCEDURE — 82533 TOTAL CORTISOL: CPT

## 2023-08-03 PROCEDURE — 87206 SMEAR FLUORESCENT/ACID STAI: CPT

## 2023-08-03 PROCEDURE — 36415 COLL VENOUS BLD VENIPUNCTURE: CPT

## 2023-08-03 PROCEDURE — C1769: CPT

## 2023-08-03 PROCEDURE — 86850 RBC ANTIBODY SCREEN: CPT

## 2023-08-03 PROCEDURE — 82962 GLUCOSE BLOOD TEST: CPT

## 2023-08-03 PROCEDURE — 84145 PROCALCITONIN (PCT): CPT

## 2023-08-03 PROCEDURE — P9059: CPT

## 2023-08-03 PROCEDURE — P9012: CPT

## 2023-08-03 PROCEDURE — 84133 ASSAY OF URINE POTASSIUM: CPT

## 2023-08-03 PROCEDURE — 75984 XRAY CONTROL CATHETER CHANGE: CPT

## 2023-08-03 PROCEDURE — 89051 BODY FLUID CELL COUNT: CPT

## 2023-08-03 PROCEDURE — 83935 ASSAY OF URINE OSMOLALITY: CPT

## 2023-08-03 PROCEDURE — 86923 COMPATIBILITY TEST ELECTRIC: CPT

## 2023-08-03 PROCEDURE — 49083 ABD PARACENTESIS W/IMAGING: CPT

## 2023-08-03 PROCEDURE — C1880: CPT

## 2023-08-03 PROCEDURE — 87070 CULTURE OTHR SPECIMN AEROBIC: CPT

## 2023-08-03 PROCEDURE — 84295 ASSAY OF SERUM SODIUM: CPT

## 2023-08-03 PROCEDURE — 87102 FUNGUS ISOLATION CULTURE: CPT

## 2023-08-03 PROCEDURE — 87040 BLOOD CULTURE FOR BACTERIA: CPT

## 2023-08-03 PROCEDURE — 85610 PROTHROMBIN TIME: CPT

## 2023-08-03 PROCEDURE — 97162 PT EVAL MOD COMPLEX 30 MIN: CPT

## 2023-08-03 PROCEDURE — 80164 ASSAY DIPROPYLACETIC ACD TOT: CPT

## 2023-08-03 PROCEDURE — 85025 COMPLETE CBC W/AUTO DIFF WBC: CPT

## 2023-08-03 PROCEDURE — 95708 EEG WO VID EA 12-26HR UNMNTR: CPT

## 2023-08-03 PROCEDURE — 82042 OTHER SOURCE ALBUMIN QUAN EA: CPT

## 2023-08-03 PROCEDURE — 74177 CT ABD & PELVIS W/CONTRAST: CPT

## 2023-08-03 PROCEDURE — 84157 ASSAY OF PROTEIN OTHER: CPT

## 2023-08-03 PROCEDURE — 82248 BILIRUBIN DIRECT: CPT

## 2023-08-03 PROCEDURE — 75726 ARTERY X-RAYS ABDOMEN: CPT

## 2023-08-03 PROCEDURE — 97164 PT RE-EVAL EST PLAN CARE: CPT

## 2023-08-03 PROCEDURE — C8929: CPT

## 2023-08-03 PROCEDURE — 81001 URINALYSIS AUTO W/SCOPE: CPT

## 2023-08-03 PROCEDURE — 97535 SELF CARE MNGMENT TRAINING: CPT

## 2023-08-03 PROCEDURE — 76000 FLUOROSCOPY <1 HR PHYS/QHP: CPT

## 2023-08-03 PROCEDURE — 86965 POOLING BLOOD PLATELETS: CPT

## 2023-08-03 PROCEDURE — 93970 EXTREMITY STUDY: CPT

## 2023-08-03 PROCEDURE — 87641 MR-STAPH DNA AMP PROBE: CPT

## 2023-08-03 PROCEDURE — 76770 US EXAM ABDO BACK WALL COMP: CPT

## 2023-08-03 PROCEDURE — 92610 EVALUATE SWALLOWING FUNCTION: CPT

## 2023-08-03 PROCEDURE — 71250 CT THORAX DX C-: CPT

## 2023-08-03 PROCEDURE — 85730 THROMBOPLASTIN TIME PARTIAL: CPT

## 2023-08-03 PROCEDURE — 82272 OCCULT BLD FECES 1-3 TESTS: CPT

## 2023-08-03 PROCEDURE — 86900 BLOOD TYPING SEROLOGIC ABO: CPT

## 2023-08-03 PROCEDURE — 82010 KETONE BODYS QUAN: CPT

## 2023-08-03 PROCEDURE — 74174 CTA ABD&PLVS W/CONTRAST: CPT

## 2023-08-03 PROCEDURE — 92507 TX SP LANG VOICE COMM INDIV: CPT

## 2023-08-03 PROCEDURE — 87116 MYCOBACTERIA CULTURE: CPT

## 2023-08-03 PROCEDURE — 87340 HEPATITIS B SURFACE AG IA: CPT

## 2023-08-03 PROCEDURE — 83735 ASSAY OF MAGNESIUM: CPT

## 2023-08-03 PROCEDURE — 84478 ASSAY OF TRIGLYCERIDES: CPT

## 2023-08-03 PROCEDURE — 97168 OT RE-EVAL EST PLAN CARE: CPT

## 2023-08-03 PROCEDURE — P9037: CPT

## 2023-08-03 PROCEDURE — C9254: CPT

## 2023-08-03 PROCEDURE — 36430 TRANSFUSION BLD/BLD COMPNT: CPT

## 2023-08-03 PROCEDURE — 82610 CYSTATIN C: CPT

## 2023-08-03 PROCEDURE — 84156 ASSAY OF PROTEIN URINE: CPT

## 2023-08-03 PROCEDURE — C1887: CPT

## 2023-08-03 PROCEDURE — 82553 CREATINE MB FRACTION: CPT

## 2023-08-03 PROCEDURE — 84484 ASSAY OF TROPONIN QUANT: CPT

## 2023-08-03 PROCEDURE — 86891 AUTOLOGOUS BLOOD OP SALVAGE: CPT

## 2023-08-03 PROCEDURE — 86901 BLOOD TYPING SEROLOGIC RH(D): CPT

## 2023-08-03 PROCEDURE — C1753: CPT

## 2023-08-03 PROCEDURE — C1874: CPT

## 2023-08-03 PROCEDURE — 83050 HGB METHEMOGLOBIN QUAN: CPT

## 2023-08-03 PROCEDURE — 85384 FIBRINOGEN ACTIVITY: CPT

## 2023-08-03 PROCEDURE — 85027 COMPLETE CBC AUTOMATED: CPT

## 2023-08-03 PROCEDURE — 87015 SPECIMEN INFECT AGNT CONCNTJ: CPT

## 2023-08-03 PROCEDURE — 82550 ASSAY OF CK (CPK): CPT

## 2023-08-03 PROCEDURE — C2628: CPT

## 2023-08-03 PROCEDURE — 82803 BLOOD GASES ANY COMBINATION: CPT

## 2023-08-03 PROCEDURE — 97116 GAIT TRAINING THERAPY: CPT

## 2023-08-03 PROCEDURE — 87086 URINE CULTURE/COLONY COUNT: CPT

## 2023-08-03 PROCEDURE — 82947 ASSAY GLUCOSE BLOOD QUANT: CPT

## 2023-08-03 PROCEDURE — P9016: CPT

## 2023-08-03 PROCEDURE — 36245 INS CATH ABD/L-EXT ART 1ST: CPT | Mod: 59

## 2023-08-03 PROCEDURE — 87150 DNA/RNA AMPLIFIED PROBE: CPT

## 2023-08-03 PROCEDURE — 90935 HEMODIALYSIS ONE EVALUATION: CPT

## 2023-08-03 PROCEDURE — 97112 NEUROMUSCULAR REEDUCATION: CPT

## 2023-08-03 PROCEDURE — 95700 EEG CONT REC W/VID EEG TECH: CPT

## 2023-08-03 PROCEDURE — 93306 TTE W/DOPPLER COMPLETE: CPT

## 2023-08-03 PROCEDURE — 84460 ALANINE AMINO (ALT) (SGPT): CPT

## 2023-08-03 PROCEDURE — 83036 HEMOGLOBIN GLYCOSYLATED A1C: CPT

## 2023-08-03 PROCEDURE — 92526 ORAL FUNCTION THERAPY: CPT

## 2023-08-03 PROCEDURE — 93307 TTE W/O DOPPLER COMPLETE: CPT

## 2023-08-03 PROCEDURE — 70450 CT HEAD/BRAIN W/O DYE: CPT

## 2023-08-03 PROCEDURE — 85018 HEMOGLOBIN: CPT

## 2023-08-03 PROCEDURE — 84300 ASSAY OF URINE SODIUM: CPT

## 2023-08-03 PROCEDURE — 49423 EXCHANGE DRAINAGE CATHETER: CPT

## 2023-08-03 PROCEDURE — 97530 THERAPEUTIC ACTIVITIES: CPT

## 2023-08-03 PROCEDURE — 82945 GLUCOSE OTHER FLUID: CPT

## 2023-08-03 PROCEDURE — 93005 ELECTROCARDIOGRAM TRACING: CPT

## 2023-08-03 PROCEDURE — 83010 ASSAY OF HAPTOGLOBIN QUANT: CPT

## 2023-08-03 PROCEDURE — 94660 CPAP INITIATION&MGMT: CPT

## 2023-08-03 PROCEDURE — 71045 X-RAY EXAM CHEST 1 VIEW: CPT

## 2023-08-03 PROCEDURE — 80235 DRUG ASSAY LACOSAMIDE: CPT

## 2023-08-03 PROCEDURE — P9047: CPT

## 2023-08-03 PROCEDURE — 86706 HEP B SURFACE ANTIBODY: CPT

## 2023-08-03 PROCEDURE — 87640 STAPH A DNA AMP PROBE: CPT

## 2023-08-03 PROCEDURE — 71275 CT ANGIOGRAPHY CHEST: CPT

## 2023-08-03 PROCEDURE — 80048 BASIC METABOLIC PNL TOTAL CA: CPT

## 2023-08-03 PROCEDURE — 92597 ORAL SPEECH DEVICE EVAL: CPT

## 2023-08-03 PROCEDURE — 74176 CT ABD & PELVIS W/O CONTRAST: CPT

## 2023-08-03 PROCEDURE — 87186 SC STD MICRODIL/AGAR DIL: CPT

## 2023-08-03 PROCEDURE — 83605 ASSAY OF LACTIC ACID: CPT

## 2023-08-03 PROCEDURE — C1760: CPT

## 2023-08-03 PROCEDURE — 97110 THERAPEUTIC EXERCISES: CPT

## 2023-08-03 PROCEDURE — 82150 ASSAY OF AMYLASE: CPT

## 2023-08-03 PROCEDURE — 94799 UNLISTED PULMONARY SVC/PX: CPT

## 2023-08-03 PROCEDURE — 36247 INS CATH ABD/L-EXT ART 3RD: CPT

## 2023-08-03 PROCEDURE — C1889: CPT

## 2023-08-03 PROCEDURE — 84540 ASSAY OF URINE/UREA-N: CPT

## 2023-08-03 PROCEDURE — 84134 ASSAY OF PREALBUMIN: CPT

## 2023-08-03 PROCEDURE — 85385 FIBRINOGEN ANTIGEN: CPT

## 2023-08-03 PROCEDURE — 84450 TRANSFERASE (AST) (SGOT): CPT

## 2023-08-03 PROCEDURE — 87493 C DIFF AMPLIFIED PROBE: CPT

## 2023-08-03 PROCEDURE — 75774 ARTERY X-RAY EACH VESSEL: CPT

## 2023-08-03 PROCEDURE — 87324 CLOSTRIDIUM AG IA: CPT

## 2023-08-03 PROCEDURE — 87449 NOS EACH ORGANISM AG IA: CPT

## 2023-08-03 PROCEDURE — 85014 HEMATOCRIT: CPT

## 2023-08-03 PROCEDURE — 80202 ASSAY OF VANCOMYCIN: CPT

## 2023-08-03 PROCEDURE — 84100 ASSAY OF PHOSPHORUS: CPT

## 2023-08-03 PROCEDURE — 87103 BLOOD FUNGUS CULTURE: CPT

## 2023-08-03 PROCEDURE — P9045: CPT

## 2023-08-03 PROCEDURE — 92612 ENDOSCOPY SWALLOW (FEES) VID: CPT

## 2023-08-03 PROCEDURE — 83690 ASSAY OF LIPASE: CPT

## 2023-08-03 PROCEDURE — 84132 ASSAY OF SERUM POTASSIUM: CPT

## 2023-08-03 PROCEDURE — 97165 OT EVAL LOW COMPLEX 30 MIN: CPT

## 2023-08-03 PROCEDURE — 82330 ASSAY OF CALCIUM: CPT

## 2023-08-03 PROCEDURE — 94003 VENT MGMT INPAT SUBQ DAY: CPT

## 2023-08-03 PROCEDURE — 94640 AIRWAY INHALATION TREATMENT: CPT

## 2023-08-03 PROCEDURE — 82140 ASSAY OF AMMONIA: CPT

## 2023-08-03 PROCEDURE — 93926 LOWER EXTREMITY STUDY: CPT

## 2023-08-03 PROCEDURE — 82570 ASSAY OF URINE CREATININE: CPT

## 2023-08-03 PROCEDURE — 94002 VENT MGMT INPAT INIT DAY: CPT

## 2023-08-03 PROCEDURE — C1729: CPT

## 2023-08-03 PROCEDURE — 74018 RADEX ABDOMEN 1 VIEW: CPT

## 2023-08-03 PROCEDURE — 80076 HEPATIC FUNCTION PANEL: CPT

## 2023-08-03 PROCEDURE — 87181 SC STD AGAR DILUTION PER AGT: CPT

## 2023-08-03 PROCEDURE — 87075 CULTR BACTERIA EXCEPT BLOOD: CPT

## 2023-08-03 PROCEDURE — P9100: CPT

## 2023-08-03 RX ADMIN — CHLORHEXIDINE GLUCONATE 1 APPLICATION(S): 213 SOLUTION TOPICAL at 06:04

## 2023-08-03 RX ADMIN — LACOSAMIDE 200 MILLIGRAM(S): 50 TABLET ORAL at 06:03

## 2023-08-03 RX ADMIN — HEPARIN SODIUM 5000 UNIT(S): 5000 INJECTION INTRAVENOUS; SUBCUTANEOUS at 17:53

## 2023-08-03 RX ADMIN — LACOSAMIDE 200 MILLIGRAM(S): 50 TABLET ORAL at 18:00

## 2023-08-03 RX ADMIN — Medication 81 MILLIGRAM(S): at 09:17

## 2023-08-03 RX ADMIN — HEPARIN SODIUM 5000 UNIT(S): 5000 INJECTION INTRAVENOUS; SUBCUTANEOUS at 09:15

## 2023-08-03 RX ADMIN — HEPARIN SODIUM 5000 UNIT(S): 5000 INJECTION INTRAVENOUS; SUBCUTANEOUS at 01:24

## 2023-08-03 RX ADMIN — Medication 100 MILLIGRAM(S): at 06:03

## 2023-08-03 RX ADMIN — Medication 650 MILLIGRAM(S): at 16:23

## 2023-08-03 RX ADMIN — CALAMINE AND ZINC OXIDE AND PHENOL 1 APPLICATION(S): 160; 10 LOTION TOPICAL at 18:00

## 2023-08-03 RX ADMIN — CALAMINE AND ZINC OXIDE AND PHENOL 1 APPLICATION(S): 160; 10 LOTION TOPICAL at 06:05

## 2023-08-03 RX ADMIN — Medication 15 MILLILITER(S): at 09:18

## 2023-08-03 RX ADMIN — PANTOPRAZOLE SODIUM 40 MILLIGRAM(S): 20 TABLET, DELAYED RELEASE ORAL at 06:04

## 2023-08-03 RX ADMIN — Medication 650 MILLIGRAM(S): at 17:23

## 2023-08-03 RX ADMIN — AMLODIPINE BESYLATE 10 MILLIGRAM(S): 2.5 TABLET ORAL at 06:03

## 2023-08-03 NOTE — PROGRESS NOTE ADULT - ASSESSMENT
A/p: 53M w PMHx of HTN, type A aortic dissection s/p Dacron grafts, AV resuspension in 2013, CAD s/p CABG x 1 SVG to RCA (5/2013 with Dr. Medina), seizure disorder, recent admission 3/20-4/14 for replacement of transverse aortic arch, second stage TEVAR and aorto-axillary bypass, AV replacement (bio 23mm), and CABG x 1 (SVG-RCA). Pt found to have endo leak and taken to OR for TEVAR revision on 5/5. Post op course c/b Klebsiella bacteremia (5/15), resp failure requiring intubation on 5/18, Mucus plugging s/p Bronch 5/19 and PEA arrest. Post operatively pt also found to have hemorrhagic pancreatitis with venu-pancreatic abscess possibly 2* to Depakote therefore s/p IR aspiration of peripancreatic fluid collection and paracentesis on 5/22, IR venu-pancreatic abscess drainage and placement of drainage catheter on 5/24. Pt then transferred from CTICU to SICU for septic shock, and further mgmt of hemorrhagic pancreatitis on 5/25. s/p IR placement of 2 new drainage catheters and catheter exchange on 5/26. LUQ drainage 5/30. OR 5/31 for exlap washout & replacement of 3 new JPs and abthera vac with open abdomen. RTOR 6/1, no bleeding, evac of old blood, placement of feeding J-tube, failed extubation due to PNA, completed ABX course and s/p trach 6/5 and decannulation 6/28. stepped down, but returned to SICU (7/2) for respiratory distress. Emergently intubated (7/3) for aspiration pneumonia, sputum cx grew pseudomonas. Extubated on 7/9. Recent CT with B/L chornic pulmonary embolisms and pneumatosis of cecum. Started on heparin gtt - stopped 2/2 to blood per rectum. Stepped up to SICU for HD monitoring on 7/18. Transferred back to SDU on 7/19 IVC filter placed on 7/19.     Patient accepting COURT placement today. Pending ambulance transport  Soft diet  Pain/nausea control prn  ASA  SQH/SCDs  OOBA/IS

## 2023-08-03 NOTE — PROGRESS NOTE ADULT - SUBJECTIVE AND OBJECTIVE BOX
Patient evaluated with chief resident during AM rounds    STATUS POST:  Second stage thoracic endovascular aortic repair (TEVAR)    Second stage thoracic endovascular aortic repair (TEVAR)    Bronchoscopy    Exploratory laparotomy    Jejunostomy feeding tube placement    EGD    Percutaneous needle tracheostomy    IVC filter placement    Exploratory laparotomy    Bronchoscopy    Jejunostomy feeding tube placement    IVC filter placement    EGD    Percutaneous needle tracheostomy    Drainage of pancreatic abscess        Overnight Events: NAEON  SUBJECTIVE: TOday patient seen with chief at bedside. No active complaints. Pain controlled. Tolerating diet without n/v. +BM, +F. No issues urinating.     Denies Chest Pain, Difficulty breathing, Calf Pain, Headache, Dizziness    OBJECTIVE:  Vital Signs Last 24 Hrs  T(C): 37.2 (03 Aug 2023 16:08), Max: 37.2 (03 Aug 2023 16:08)  T(F): 99 (03 Aug 2023 16:08), Max: 99 (03 Aug 2023 16:08)  HR: 91 (03 Aug 2023 16:08) (81 - 91)  BP: 147/93 (03 Aug 2023 16:08) (129/78 - 147/93)  BP(mean): --  RR: 17 (03 Aug 2023 16:08) (16 - 20)  SpO2: 100% (03 Aug 2023 16:08) (96% - 100%)    Parameters below as of 03 Aug 2023 16:08  Patient On (Oxygen Delivery Method): room air        I&O's Summary    02 Aug 2023 07:01  -  03 Aug 2023 07:00  --------------------------------------------------------  IN: 1040 mL / OUT: 1700 mL / NET: -660 mL    03 Aug 2023 07:01  -  03 Aug 2023 17:40  --------------------------------------------------------  IN: 480 mL / OUT: 600 mL / NET: -120 mL        Physical Exam:  General Appearance: Appears well, NAD  Pulmonary: Nonlabored breathing, no respiratory distress  Cardiovascular: NSR  Abdomen: Soft, nondistneded, non-tender. J tube in place  Extremities: WWP, SCD's in place     LABS:

## 2023-08-03 NOTE — DISCHARGE NOTE NURSING/CASE MANAGEMENT/SOCIAL WORK - PATIENT PORTAL LINK FT
You can access the FollowMyHealth Patient Portal offered by [facility] by registering at the following website: [email or web address]. By joining "[employer]’s FollowMyHealth portal, you will also be able to view your health information using other applications (apps) compatible with our system.

## 2023-08-03 NOTE — DISCHARGE NOTE NURSING/CASE MANAGEMENT/SOCIAL WORK - NSDCFUADDAPPT_GEN_ALL_CORE_FT
Please follow up with your primary care provider as soon as able after discharge.     Follow up with your home neurologist within 1 week of discharge date.    Follow up with Dr. Ellison (Vascular) in 1-2 weeks since day of discharge call the number provided to make an appointment for possible IVC filter removal.     You may follow up with Dr. Cardoso (urologist) if any urinary symptoms arise.

## 2023-08-04 PROBLEM — G40.909 EPILEPSY, UNSPECIFIED, NOT INTRACTABLE, WITHOUT STATUS EPILEPTICUS: Chronic | Status: ACTIVE | Noted: 2023-03-07

## 2023-08-04 PROBLEM — I71.00 DISSECTION OF UNSPECIFIED SITE OF AORTA: Chronic | Status: ACTIVE | Noted: 2023-03-07

## 2023-08-04 PROBLEM — Z98.890 OTHER SPECIFIED POSTPROCEDURAL STATES: Chronic | Status: ACTIVE | Noted: 2023-05-31

## 2023-08-09 NOTE — PATIENT PROFILE ADULT - NSPROIMPLANTSMEDDEV_GEN_A_NUR
Gonzalo Sadler is a 48 year old male presenting to the walk-in clinic today alone for workers comp injury. Per pt he was carrying a jug of oil up a ladder and the ladder shifted and pt fell onto his left ribs on a beam around 0800. Pt concerned for left rib fracture.     Treatment tried prior to visit: none. Pt denies taking tylenol or ibuprofen for pain yet.     Swabs/Specimens collected during rooming process:  None    Work, School or  note needed: Yes, work     New vs Established: Established    Patient would like communication of their results via:        Cell Phone:   Telephone Information:   Mobile [phone]     Okay to leave a message containing results? Yes     CABG X1/None

## 2023-08-14 PROBLEM — I10 ESSENTIAL (PRIMARY) HYPERTENSION: Chronic | Status: ACTIVE | Noted: 2023-03-07

## 2023-08-14 PROBLEM — I25.10 ATHEROSCLEROTIC HEART DISEASE OF NATIVE CORONARY ARTERY WITHOUT ANGINA PECTORIS: Chronic | Status: ACTIVE | Noted: 2023-03-07

## 2023-08-14 PROBLEM — G40.909 EPILEPSY, UNSPECIFIED, NOT INTRACTABLE, WITHOUT STATUS EPILEPTICUS: Chronic | Status: ACTIVE | Noted: 2023-04-27

## 2023-08-14 PROBLEM — I10 ESSENTIAL (PRIMARY) HYPERTENSION: Chronic | Status: ACTIVE | Noted: 2023-04-27

## 2023-08-21 ENCOUNTER — APPOINTMENT (OUTPATIENT)
Dept: SURGICAL ONCOLOGY | Facility: CLINIC | Age: 53
End: 2023-08-21
Payer: COMMERCIAL

## 2023-08-21 VITALS
OXYGEN SATURATION: 100 % | DIASTOLIC BLOOD PRESSURE: 74 MMHG | HEART RATE: 80 BPM | BODY MASS INDEX: 20.32 KG/M2 | SYSTOLIC BLOOD PRESSURE: 132 MMHG | HEIGHT: 72 IN | RESPIRATION RATE: 16 BRPM | WEIGHT: 150 LBS | TEMPERATURE: 98.7 F

## 2023-08-21 PROCEDURE — 99024 POSTOP FOLLOW-UP VISIT: CPT

## 2023-08-21 NOTE — ASSESSMENT
[FreeTextEntry1] : Mr. Villalobos is a 53-year-old gentleman who comes to see me today for his first visit following discharge from the hospital.  He was originally admitted to [facility] at the beginning of May for an endoleak of is aortic graft. On May 5 he underwent TEVAR revision.   He then developed severe hemorrhagic necrotic pancreatitis which required a trip to the operating room on May 31 for an exploratory laparotomy with washout and placement of multiple drains.  He returned to the OR after 24 hours for a second look and closure of the abdomen. His postoperative course was quite complicated and caracterized by respiratory failure, pneumonia, need for a tracheostomy, acute renal failure requiring aquaphoresis, PE requiring IVC filter placement, and need for additional percutaneous abdominal drainage. He eventually recovered, the tracheostomy was removed, all abdominal drains but the feeding jej were removed, and was discharged to an acute rehab.   On today's visit he has no major complaints.  He continues his physical therapy and his level of activities is improving although is still unable to walk. He is tolerating diet by mouth and he is adamant that he wants his feeding jejunostomy removed.  I explained that the safest course of action would be to keep the jejunostomy since he still not gaining enough weight, however the patient is not using the feeding tube and he really wants this removed.  Accordingly, I remove the feeding tube.  There were no issues and the tube appeared intact upon removal.  I asked the patient to notify me immediately should he notice any abdominal pain fever or chills or worsening output from the tube site within the next 24 to 48 hours.   Mr. Villalobos is not complaining of diarrhea and has not noted any stool floating when he goes to the bathroom.  I asked him to make sure that is not the case and if that happens we will prescribe pancreatic enzymes.   The patient also told me that his blood glucose levels are not being measured routinely at the acute rehab and I have encouraged him to make sure that this is done.   Finally, Mr. Villalobos has noted a mass in the medial aspect of the left thigh and a smaller one in the right groin. On my exam, they appear to be residual hematomas from prior femoral central line placements. We carlton obtain an MRI to confirm. Mr. Villalobos has asked me a recommendation for a PCP. I will be sending him to see Dr. Tom Blanchard.   Mr. Villalobos will follow-up with me in a month.

## 2023-08-21 NOTE — REVIEW OF SYSTEMS
[Negative] : Heme/Lymph [FreeTextEntry8] : as per HPI [de-identified] : reportedly has masses on his legs [de-identified] : hx seizures

## 2023-08-21 NOTE — RESULTS/DATA
[FreeTextEntry1] : ACC: [id number] EXAM:  CT ABDOMEN AND PELVIS IC   ORDERED BY: ILDEFONSO SIMPSON   ACC: [id number] EXAM:  CT ANGIO CHEST PULM ART [facility]   ORDERED BY: JUANY FERRARI   PROCEDURE DATE:  07/17/2023      INTERPRETATION:  CLINICAL INFORMATION: Hypoxia. Hypertension. Swelling of  extremity. New onset tachycardia and tachypnea. Pancreatitis. Rule out  pulmonary embolism.  COMPARISON: Most recent CT scan of chest, abdomen and pelvis from  7/2/2023. Additional prior imaging studies dating back to 9/19/2022.  CONTRAST/COMPLICATIONS: IV Contrast: Isovue 370  90 cc administered 10 cc discarded Oral Contrast: NONE Complications: None reported at time of study completion  PROCEDURE: CT Angiography of the Chest was performed followed by portal venous phase  imaging of the Abdomen and Pelvis. Sagittal and coronal reformats were performed as well as 3D (MIP)  reconstructions.  FINDINGS: CHEST: LUNGS AND LARGE AIRWAYS: There are again large areas of groundglass  opacity with patchy consolidation and thickening of interlobular septa  right upper, middle, and lower lobes. New large area of ground glass  opacity, consolidation, and interlobular septal thickening left upper  lobe. Slight improved aeration left lower lobe, though there is still a  large amount of atelectasis in the left lower lobe. Improvement in small  amount of atelectasis right lower lobe adjacent to pleural effusion. PLEURA: No significant change large bilateral pleural effusions. Right  pleural effusion again partially loculated in major fissure. VESSELS: Filling defects are present in the right descending interlobar  artery (18/77), in the lobar artery to the right lower lobe (18/85), and  in a segmental branch of the pulmonary artery to the lateral segment of  the right middle lobe (18/87) in retrospect, these filling defects were  present on the studies of 7/2/2023 and 6/16/2023. They were not present  on the study of 5/22/2023. Main pulmonary artery diameter is 2.9 cm.  Again post aortic valve replacement and replacement of the ascending  aorta and hemiarch. Endovascular stent graft again present in true lumen  of aortic dissection, extending from aortic arch through descending  aorta. Evaluation of the aorta is limited as there is minimal contrast in  the aorta as this examination was optimized to enhance the pulmonary  arteries. The size of the thoracic aorta has not significantly changed,  with the aortic root measuring 4.1 cm, the ascending aorta 3.2 cm, the  aortic arch 4.8 cm, and the mid descending aorta 6.1 cm. Reflux of  contrast material into the inferior vena cava and hepatic veins is  consistent with right heart failure. HEART: Cardiomegaly. Right ventricle larger than left ventricle (RV LV  ratio is 1.34). This has been the case since the first CT scan of  11/30/2022. No pericardial effusion. MEDIASTINUM AND MARJAN: No significant change thoracic lymphadenopathy. For  instance, there is a 1.8 cm lymph node in station 4R and a 1.7 cm lymph  node in station 4L. A few additional smaller lymph nodes are also present. CHEST WALL AND LOWER NECK: Anasarca.  ABDOMEN AND PELVIS: LIVER: Within normal limits. BILE DUCTS: Normal caliber. GALLBLADDER: Within normal limits. SPLEEN: Fluid density splenic lesion has decreased in size from 4.4 x 2.7  cm to 3.7 x 2.3 cm. This likely represents resolving infarct. PANCREAS: Improvement in the appearance of the pancreas. For instance,  the pancreatic tail has decreased in size and the  pancreatic/peripancreatic fluid collections have decreased in size. ADRENALS: Within normal limits. KIDNEYS/URETERS: Development of multiple wedge-shaped areas of diminished  enhancement extending to the cortex of each kidney. The differential  diagnosis includes bilateral renal infarcts and pyelonephritis. Right  renal artery arises from the true lumen of aortic dissection and left  renal artery from false lumen.  BLADDER: Within normal limits. REPRODUCTIVE ORGANS: Prostate within normal limits.  BOWEL: Pancreatic inflammatory change again extends to involve the  gastric fundus. Percutaneous jejunostomy tube remains in place. Locules  of gas are present in the dependent portion of the cecal wall, consistent  with pneumatosis coli. No bowel obstruction. No free air. PERITONEUM: Moderate amount of ascites again present. VESSELS: Dissection flap again extends throughout abdominal aorta into  left common iliac artery. No significant change in abdominal aortic  aneurysm, with suprarenal abdominal aorta measuring 4.4 cm and infrarenal  abdominal aorta measuring 3.6 cm. Celiac artery and superior mesenteric  artery arise from true lumen. Severe stenosis at origin of celiac artery.  Superior mesenteric artery is patent. Inferior mesenteric artery arises  from false lumen. Inferior mesenteric artery patent. RETROPERITONEUM/LYMPH NODES: No lymphadenopathy. ABDOMINAL WALL: Surgical clips right groin. Anasarca. BONES: Sternotomy wires.  IMPRESSION: 1. Since 7/2/2023, there are filling defects in right-sided pulmonary  arteries, consistent with pulmonary emboli. In retrospect, these  pulmonary emboli have been present on prior studies dating back to  6/16/2023. No new pulmonary emboli are identified.  2. Multiple new wedge-shaped areas of diminished enhancement in the  kidneys bilaterally. These are suspicious for infarcts. The differential  diagnosis includes pyelonephritis.  3. Pneumatosis coli in the cecum. This raises the suspicion of ischemia.  4. New left upper lobe infiltrate in addition to right-sided infiltrates.  The differential diagnosis includes ARDS, edema, infection, and  hemorrhage.  5. The right ventricle is larger than the left ventricle. While this may  be a sign of right heart strain, this finding has been present since the  patient's first CT scan of 11/30/2022. The right heart enlargement may  therefore be a sign of pulmonary hypertension.  6. No significant change large bilateral pleural effusions.  7. Improvement in the appearance of the pancreas.  8. No significant change in the appearance of the postoperative  appearance of the thoracoabdominal aortic dissection and aneurysm.  9. Decrease in size of splenic infarct.  10. Anasarca.  Findings were discussed with Dr. Mildred Carter on 7/17/2023 11:52 AM by  Dr. Wheat with read back confirmation.  --- End of Report ---      LEE WHEAT MD; Attending Radiologist This document has been electronically signed. Jul 17 2023 11:57AM

## 2023-08-21 NOTE — PHYSICAL EXAM
[Normal] : oriented to person, place and time, with appropriate affect [de-identified] : thin, in no acute distress [de-identified] : anicteric [de-identified] : S1S2, murmur heard [de-identified] : Clear throughout. No wheezes heard. [de-identified] : j tube present to Q [de-identified] : warm, dry, there is some drainage surrounding Jtube site

## 2023-08-22 ENCOUNTER — APPOINTMENT (OUTPATIENT)
Dept: UROLOGY | Facility: CLINIC | Age: 53
End: 2023-08-22
Payer: COMMERCIAL

## 2023-08-22 VITALS
HEIGHT: 72 IN | BODY MASS INDEX: 20.32 KG/M2 | HEART RATE: 70 BPM | WEIGHT: 150 LBS | OXYGEN SATURATION: 99 % | SYSTOLIC BLOOD PRESSURE: 132 MMHG | RESPIRATION RATE: 16 BRPM | DIASTOLIC BLOOD PRESSURE: 77 MMHG | TEMPERATURE: 98.6 F

## 2023-08-22 DIAGNOSIS — Z87.448 PERSONAL HISTORY OF OTHER DISEASES OF URINARY SYSTEM: ICD-10-CM

## 2023-08-22 DIAGNOSIS — Z83.3 FAMILY HISTORY OF DIABETES MELLITUS: ICD-10-CM

## 2023-08-22 PROCEDURE — 99214 OFFICE O/P EST MOD 30 MIN: CPT

## 2023-08-23 LAB
APPEARANCE: CLEAR
BACTERIA: NEGATIVE /HPF
BILIRUBIN URINE: NEGATIVE
BLOOD URINE: NEGATIVE
CAST: 1 /LPF
COLOR: NORMAL
EPITHELIAL CELLS: 2 /HPF
GLUCOSE QUALITATIVE U: NEGATIVE MG/DL
KETONES URINE: NEGATIVE MG/DL
LEUKOCYTE ESTERASE URINE: ABNORMAL
MICROSCOPIC-UA: NORMAL
NITRITE URINE: NEGATIVE
PH URINE: 6.5
PROTEIN URINE: 100 MG/DL
RED BLOOD CELLS URINE: 2 /HPF
SPECIFIC GRAVITY URINE: >1.03
UROBILINOGEN URINE: 0.2 MG/DL
WHITE BLOOD CELLS URINE: 2 /HPF

## 2023-08-23 NOTE — HISTORY OF PRESENT ILLNESS
[FreeTextEntry1] : Language: English Date of First visit: 08/22/2023 Accompanied by: self Contact info: Referring Provider/PCP:  Fax:   CC/ Problem List:  Gross hematuria in setting of instrumentation =============================================================================== FIRST VISIT / Summary: Very pleasant 53 year old M with extensive surgical and hospital admission hx this year including multiple ICU stays. See Dr. Solano note for summary. While inpatient, had multiple catheters and condom catheters. Had some irritation from the latter and next day gross hematuria that quickly resolved.   They denied risk factors except as noted above including but not limited to: chemicals including dye, petroleum derivatives, chemotherapy, radiation, foreign objects (stents, catheters, stones, etc), or infections (TB, schistosomiasis, etc).    ------------------------------------------------------------------------------------------- INTERVAL VISITS:   ===============================================================================   PMH: aortic aneurysm Meds: amlodipine, lacosamide All: nkda FHx: No  malignancies  SocHx: nonsmoker, no etoh, retired   PSH: aneurysm repair, followed by endoleak repair with prolonged hospitalization.    ROS: Review of Systems is as per HPI unless otherwise denoted below   =============================================================================== DATA: LABS (SELECTED):---------------------------------------------------------------------------------------------------     RADS:-------------------------------------------------------------------------------------------------------------------     PATHOLOGY/CYTOLOGY:-------------------------------------------------------------------------------------------     VOIDING STUDIES: ----------------------------------------------------------------------------------------------------  08/22/2023 PVR  5   STONE STUDIES: (Analysis/LLSA)----------------------------------------------------------------------------------     PROCEDURES: -----------------------------------------------------------------------------------------------         ===============================================================================   PHYSICAL EXAM:     FOCUSED: ----------------------------------------------------------------------------------------------------------------     ======================================================================================= DISCUSSION: ======================================================================================= ASSESSMENT and PLAN   1. Gross hematuria - was in setting of instrumentation - recheck UA and cx today - PVR again normal - further care only if abnormal results from above  Addendum: UA negative for blood, concentrated and proteinuria. Left message continue medical follow up no further workup indicated for hematuria   =======================================================================================  4 Thank you for allowing me to assist in the care of your patient. Should you have any questions please do not hesitate to reach out to me.     Godfrey Cardoso MD                                                           [facility] Physician [facility]   [place] Office: [address], [address]  [place] [address]   T: [phone]   F: [phone]     [place] Office: [address], [address] [place] [address] T: [phone] F: [phone]

## 2023-08-25 LAB — BACTERIA UR CULT: NORMAL

## 2023-08-29 ENCOUNTER — OUTPATIENT (OUTPATIENT)
Dept: OUTPATIENT SERVICES | Facility: HOSPITAL | Age: 53
LOS: 1 days | End: 2023-08-29
Payer: COMMERCIAL

## 2023-08-29 ENCOUNTER — APPOINTMENT (OUTPATIENT)
Dept: MRI IMAGING | Facility: HOSPITAL | Age: 53
End: 2023-08-29

## 2023-08-29 ENCOUNTER — APPOINTMENT (OUTPATIENT)
Dept: VASCULAR SURGERY | Facility: CLINIC | Age: 53
End: 2023-08-29
Payer: COMMERCIAL

## 2023-08-29 VITALS
BODY MASS INDEX: 20.32 KG/M2 | SYSTOLIC BLOOD PRESSURE: 127 MMHG | HEART RATE: 69 BPM | HEIGHT: 72 IN | DIASTOLIC BLOOD PRESSURE: 76 MMHG | WEIGHT: 150 LBS

## 2023-08-29 DIAGNOSIS — Z95.1 PRESENCE OF AORTOCORONARY BYPASS GRAFT: Chronic | ICD-10-CM

## 2023-08-29 DIAGNOSIS — I26.99 OTHER PULMONARY EMBOLISM W/OUT ACUTE COR PULMONALE: ICD-10-CM

## 2023-08-29 DIAGNOSIS — Z95.828 PRESENCE OF OTHER VASCULAR IMPLANTS AND GRAFTS: Chronic | ICD-10-CM

## 2023-08-29 PROCEDURE — A9585: CPT

## 2023-08-29 PROCEDURE — 73720 MRI LWR EXTREMITY W/O&W/DYE: CPT

## 2023-08-29 PROCEDURE — 72197 MRI PELVIS W/O & W/DYE: CPT

## 2023-08-29 PROCEDURE — 72197 MRI PELVIS W/O & W/DYE: CPT | Mod: 26

## 2023-08-29 PROCEDURE — 99214 OFFICE O/P EST MOD 30 MIN: CPT

## 2023-08-29 PROCEDURE — 73720 MRI LWR EXTREMITY W/O&W/DYE: CPT | Mod: 26,50

## 2023-08-29 RX ORDER — DIVALPROEX SODIUM 500 MG/1
500 TABLET, EXTENDED RELEASE ORAL DAILY
Refills: 0 | Status: DISCONTINUED | COMMUNITY
End: 2023-08-29

## 2023-08-29 RX ORDER — COLLAGENASE SANTYL 250 [ARB'U]/G
250 OINTMENT TOPICAL DAILY
Qty: 2 | Refills: 0 | Status: DISCONTINUED | COMMUNITY
Start: 1900-01-01 | End: 2023-08-29

## 2023-08-29 RX ORDER — DIVALPROEX SODIUM 250 MG/1
250 TABLET, EXTENDED RELEASE ORAL DAILY
Refills: 0 | Status: DISCONTINUED | COMMUNITY
End: 2023-08-29

## 2023-08-29 RX ORDER — MEGESTROL ACETATE 20 MG/1
20 TABLET ORAL DAILY
Qty: 30 | Refills: 0 | Status: DISCONTINUED | COMMUNITY
Start: 2023-04-26 | End: 2023-08-29

## 2023-08-29 RX ORDER — METOPROLOL SUCCINATE 100 MG/1
100 TABLET, EXTENDED RELEASE ORAL DAILY
Refills: 0 | Status: DISCONTINUED | COMMUNITY
End: 2023-08-29

## 2023-08-29 NOTE — DISCUSSION/SUMMARY
[FreeTextEntry1] : 53yoM w/complicated medical hx including Type A aortic dissection s/p repair but endoleak noted on recent admission, recently taken to OR w/CTSx for endoleak, last admission complicated by pulmonary embolism w/a contraindication to anticoagulation.  Pt subsequently underwent IVC filter placement, returns today to discuss retrieval.  He is in a rehab facility, participates in daily rehab and is slowly recovering; he is tolerating PO food/drink, and denies any recent bleeding issues.  He is scheduled for MRI abd/pelvis/LLE to evaluate solid masses believed to be resolving hematomas.  No swelling in LEs and abd soft, NT/ND.  Pt NOT currently on an anticoagulant, but if MRI today is negative for pseudoaneurysm, DOAC can be started, and IVC filter can be removed in the OR.  Will plan for IVC filter retrieval 09/13 as long as pt is ambulatory and be safely anticoagulated.

## 2023-08-29 NOTE — PHYSICAL EXAM
[JVD] : no jugular venous distention  [Normal Thyroid] : the thyroid was normal [Carotid Bruits] : no carotid bruits [Normal Breath Sounds] : Normal breath sounds [Respiratory Effort] : normal respiratory effort [Normal Heart Sounds] : normal heart sounds [Normal Rate and Rhythm] : normal rate and rhythm [Right Carotid Bruit] : no bruit heard over the right carotid [Left Carotid Bruit] : no bruit heard over the left carotid [2+] : left 2+ [Ankle Swelling (On Exam)] : not present [Varicose Veins Of Lower Extremities] : not present [] : not present [Abdomen Masses] : No abdominal masses [Abdomen Tenderness] : ~T ~M No abdominal tenderness [No Rash or Lesion] : No rash or lesion [Purpura] : no purpura  [Petechiae] : no petechiae [Skin Ulcer] : no ulcer [Skin Induration] : no induration [Alert] : alert [Calm] : calm [de-identified] : Healthy, NAD, stable in wheelchair [de-identified] : NC/AT, anicteric [de-identified] : Firm masses of the L medial thigh and R groin, non-tender, mobile, smooth borders, slightly mobile

## 2023-08-29 NOTE — REASON FOR VISIT
[de-identified] : Placement of IVC filter [de-identified] : 07/20/2023 [de-identified] : 40 [de-identified] : 5 [de-identified] : 53yoM w/complicated medical hx including Type A aortic dissection s/p repair but endoleak noted on recent admission, recently taken to OR w/CTSx for endoleak, last admission complicated by pulmonary embolism w/a contraindication to anticoagulation.  Pt subsequently underwent IVC filter placement, returns today to discuss retrieval.  He is in a rehab facility, participates in daily rehab and is slowly recovering; he is tolerating PO food/drink, and denies any recent bleeding issues.  He is scheduled for MRI abd/pelvis/LLE to evaluate solid masses believed to be resolving hematomas.

## 2023-08-29 NOTE — ADDENDUM
[FreeTextEntry1] : This note was written by Francesco Maria, acting as a scribe for Dr. Vilma Ellison.  I, Dr. Vilma Ellison, have read and attest that all the information, medical decision-making, and discharge instructions within are true and accurate.  I, Dr. Vilma Ellison, personally performed the evaluation and management (E/M) services for this new patient.  That E/M includes conducting the initial examination, assessing all conditions, and establishing the plan of care.  Today, my ACP, Francesco Maria, was here to observe my evaluation and management services for this patient to be followed going forward.

## 2023-08-31 RX ORDER — APIXABAN 5 MG (74)
5 KIT ORAL
Qty: 1 | Refills: 0 | Status: DISCONTINUED | COMMUNITY
Start: 2023-08-31 | End: 2023-08-31

## 2023-09-02 ENCOUNTER — OUTPATIENT (OUTPATIENT)
Dept: OUTPATIENT SERVICES | Facility: HOSPITAL | Age: 53
LOS: 1 days | End: 2023-09-02
Payer: COMMERCIAL

## 2023-09-02 ENCOUNTER — RESULT REVIEW (OUTPATIENT)
Age: 53
End: 2023-09-02

## 2023-09-02 ENCOUNTER — APPOINTMENT (OUTPATIENT)
Dept: CT IMAGING | Facility: HOSPITAL | Age: 53
End: 2023-09-02

## 2023-09-02 DIAGNOSIS — Z95.1 PRESENCE OF AORTOCORONARY BYPASS GRAFT: Chronic | ICD-10-CM

## 2023-09-02 DIAGNOSIS — Z95.828 PRESENCE OF OTHER VASCULAR IMPLANTS AND GRAFTS: Chronic | ICD-10-CM

## 2023-09-02 PROCEDURE — 74174 CTA ABD&PLVS W/CONTRAST: CPT | Mod: 26

## 2023-09-02 PROCEDURE — 74174 CTA ABD&PLVS W/CONTRAST: CPT

## 2023-09-06 ENCOUNTER — NON-APPOINTMENT (OUTPATIENT)
Age: 53
End: 2023-09-06

## 2023-09-06 NOTE — HISTORY OF PRESENT ILLNESS
[de-identified] : Patient Name: VINCENT MARIE  MRN: [id number]  Charlette MRN: [id number] Date:   54y/o Male, current everyday marijuana use, w/ PMHx of HTN, type A aortic dissection s/p Dacron grafts, AV resuspension in 2013, CAD s/p CABG x 1 SVG to RCA (5/2013 with Dr. Medina), seizure disorder (last episode on 7/4/22). He had a recent hospital admission 3/20-4/14 during which patient underwent replacement of transverse aortic arch, second stage thoracic endovascular aortic repair, aorto-axillary bypass, AV replacement (bio 23mm), and CABG x 1 (SVG-RCA) EF 75% (Ignacio, 3/20). Post op c/b lactic acidosis, severe cardiogenic and vasogenic shock, TREY requiring CVVHD and temporary dialysis requirement.   Patient presented to [facility] ED 4/27 for syncope vs seizure episode at home, falling onto back of head. Neurological workup performed during that visit revealed a negative EEG, but given clinical picture of seizures, pt started on Vimpat and Depakote. Imaging preformed during that admission did not require acute surgical intervention on endo leak.   Pt presented to [place] ED on 5/4 complaining of worsening epigastric pain. CTAP revealed continued endoleak. Sent to [facility] for further evaluation.   On 5/5, pt taken to the OR, and underwent TEVAR revision. On 5/13, pt underwent Celiac, SMA, splenic and left gastric angiogram which revealed no pseudoaneurysm or any active bleeding. Post op course c/b Klebsiella bacteremia (5/15), resp failure requiring intubation on 5/18, Mucus plugging s/p Bronch 5/19 and PEA arrest.   Post operatively pt also found to have hemorrhagic pancreatitis with venu-pancreatic abscess (thought may be due to Depakote?) and is s/p IR aspiration of peripancreatic fluid collection and paracentesis on 5/22, IR venu-pancreatic abscess drainage and placement of drainage catheter on 5/24. Pt then transferred from CTICU to SICU for septic shock, and further management of hemorrhagic pancreatitis on 5/25. He is s/p IR placement of 2 new drainage catheters and catheter exchange on 5/26. LUQ drainage 5/30. He was taken to the OR 5/31 for exlap washout & replacement of 3 new JPs and abthera vac with open abdomen. Returned to OR on 6/1, no bleeding, evac of old blood, placement of feeding J-tube. He had a failed extubation and is s/p trach 6/5 with pneumonia. Repeat bronchoscopy on 6/9 with lavage. On 6/11, Aquaphoresis stopped per renal. bronch cx kleb and enterobacter, pt was on Meropenem and CTX. 6/13, TF reached goal. On 6/14, he was switched to trach collar. 6/16, CT AP done without new collections. He was improving. On 6/30 Hgb came down to 6.5 and he 1 unit given. CT AP done on suggestive of free air likely 2/2 to tube removal, multiple pancreatic collections slightly increased, moderate b/l pleural effusions, periportal edema, gastric wall thickening, stable aortic dissection. On 7/1, pt was septic and saturating 87% on RA. CT c/a/p suggestive of NEW large area of groundglass density involving the right upper and middle lobe suspicious for acute infectious inflammatory process. Pt transferred to SICU and was intubated for respiratory distress. On 7/4, Sputum growing pseudomonas, antibiotics adjusted from meropenem and vancomycin to Zosyn. On 7/8 patient's coulter was discontinued and 7/9 patient was able to be extubated. On 7/11 speech and swallow recommended a minced and moist diet. On 7/13 patient was able to step down from SICU to a telemetry floor. On 7/17, patient became hypoxic needing to be place on BiPAP to maintain adequate oxygenation. He was able to be progressed down to nasal canal then room air with adequate oxygenation. CT Chest Angio, Abd and Pelvis showed multiple old PE seen since June scans, multiple infarcts in both kidneys, possible ischemic cecum, and left upper lobe infiltrate. Patient was given lasix and started on an Heparin drip. He had a bloody BM the next day, was taken for an IVC filter and taken off heparin IV. Patient was transferred to SICU for further monitoring. Patient was restarted on SQH, ASA, and PO home meds (norvasc and metoprolol) for HTN. Minced and moist diet recommended per speech and swallow, which e was tolerating with careful calorie count monitoring. Patient was stepped down to a telemetry floor. 7/23 WBC elevation to 14, CXR confirmed scattered lung infiltrates and bilateral pleural effusions similar to previous CXRs. On 7/27 patient kept comfortable with good pain control. He was discharged on 8/4/23 to rehab, [facility] in [place].  He is tolerating a regular diet.  He does complain of abdominal pain and it is being managed with oral Tylenol. Pain is where the JTube is and he has pain with movement or if the tube is maneuvered at all. He is having regular bowel movements but does fluctuate between regular bowel movements and diarrhea.  He is concerned about "lumps" on his bilateral inner thighs which are growing. He was referred for MRI of the area and is here to review results.

## 2023-09-11 ENCOUNTER — APPOINTMENT (OUTPATIENT)
Dept: SURGICAL ONCOLOGY | Facility: CLINIC | Age: 53
End: 2023-09-11
Payer: COMMERCIAL

## 2023-09-11 VITALS
RESPIRATION RATE: 16 BRPM | DIASTOLIC BLOOD PRESSURE: 80 MMHG | SYSTOLIC BLOOD PRESSURE: 126 MMHG | HEART RATE: 98 BPM | HEIGHT: 72 IN | OXYGEN SATURATION: 98 %

## 2023-09-11 PROCEDURE — 99214 OFFICE O/P EST MOD 30 MIN: CPT

## 2023-09-12 VITALS
DIASTOLIC BLOOD PRESSURE: 85 MMHG | TEMPERATURE: 98 F | OXYGEN SATURATION: 99 % | WEIGHT: 139.99 LBS | HEIGHT: 72 IN | RESPIRATION RATE: 16 BRPM | SYSTOLIC BLOOD PRESSURE: 138 MMHG | HEART RATE: 91 BPM

## 2023-09-12 NOTE — ASU PATIENT PROFILE, ADULT - NSICDXPASTMEDICALHX_GEN_ALL_CORE_FT
PAST MEDICAL HISTORY:  Aortic dissection     CAD (coronary artery disease)     HTN (hypertension)     Hypertension     Seizure disorder     Seizure disorder     Status post endovascular aneurysm repair (EVAR)      PAST MEDICAL HISTORY:  Aortic dissection     CAD (coronary artery disease)     DVT, lower extremity     HTN (hypertension)     Hypertension     Pancreatitis hospitalized for 5 months per spouse    Pulmonary embolism     Seizure disorder     Seizure disorder     Status post endovascular aneurysm repair (EVAR)

## 2023-09-12 NOTE — ASU PATIENT PROFILE, ADULT - FALL HARM RISK - HARM RISK INTERVENTIONS

## 2023-09-13 ENCOUNTER — OUTPATIENT (OUTPATIENT)
Dept: OUTPATIENT SERVICES | Facility: HOSPITAL | Age: 53
LOS: 1 days | Discharge: HOME | End: 2023-09-13
Payer: COMMERCIAL

## 2023-09-13 ENCOUNTER — TRANSCRIPTION ENCOUNTER (OUTPATIENT)
Age: 53
End: 2023-09-13

## 2023-09-13 ENCOUNTER — APPOINTMENT (OUTPATIENT)
Dept: VASCULAR SURGERY | Facility: HOSPITAL | Age: 53
End: 2023-09-13

## 2023-09-13 ENCOUNTER — RESULT REVIEW (OUTPATIENT)
Age: 53
End: 2023-09-13

## 2023-09-13 VITALS
HEART RATE: 88 BPM | SYSTOLIC BLOOD PRESSURE: 135 MMHG | RESPIRATION RATE: 15 BRPM | DIASTOLIC BLOOD PRESSURE: 67 MMHG | OXYGEN SATURATION: 100 % | TEMPERATURE: 97 F

## 2023-09-13 DIAGNOSIS — Z95.828 PRESENCE OF OTHER VASCULAR IMPLANTS AND GRAFTS: Chronic | ICD-10-CM

## 2023-09-13 DIAGNOSIS — Z41.9 ENCOUNTER FOR PROCEDURE FOR PURPOSES OTHER THAN REMEDYING HEALTH STATE, UNSPECIFIED: Chronic | ICD-10-CM

## 2023-09-13 DIAGNOSIS — Z95.1 PRESENCE OF AORTOCORONARY BYPASS GRAFT: Chronic | ICD-10-CM

## 2023-09-13 PROCEDURE — 88300 SURGICAL PATH GROSS: CPT

## 2023-09-13 PROCEDURE — 76000 FLUOROSCOPY <1 HR PHYS/QHP: CPT

## 2023-09-13 PROCEDURE — 37193 REM ENDOVAS VENA CAVA FILTER: CPT | Mod: GC

## 2023-09-13 PROCEDURE — C1773: CPT

## 2023-09-13 PROCEDURE — 88300 SURGICAL PATH GROSS: CPT | Mod: 26

## 2023-09-13 PROCEDURE — 37193 REM ENDOVAS VENA CAVA FILTER: CPT

## 2023-09-13 PROCEDURE — C1769: CPT

## 2023-09-13 PROCEDURE — C1887: CPT

## 2023-09-13 PROCEDURE — C1894: CPT

## 2023-09-13 DEVICE — SNARE RETRIEVER VASC 4 LOOP CLOVERSNARE: Type: IMPLANTABLE DEVICE | Status: FUNCTIONAL

## 2023-09-13 DEVICE — GUIDEWIRE AMPLATZ SUPER-STIFF STRAIGHT .035" X 180CM: Type: IMPLANTABLE DEVICE | Status: FUNCTIONAL

## 2023-09-13 DEVICE — SHEATH INTRODUCER TERUMO PINNACLE CORONARY 6FR X 10CM X 0.038" MINI WIRE: Type: IMPLANTABLE DEVICE | Status: FUNCTIONAL

## 2023-09-13 DEVICE — SHEATH INTRODUCER TERUMO PINNACLE CORONARY 5FR X 10CM X 0.038" MINI WIRE: Type: IMPLANTABLE DEVICE | Status: FUNCTIONAL

## 2023-09-13 DEVICE — INTRO MICROPUNC 5FRX10CM SS: Type: IMPLANTABLE DEVICE | Status: FUNCTIONAL

## 2023-09-13 DEVICE — CATH ANG BERENSTN 5FRX65CM: Type: IMPLANTABLE DEVICE | Status: FUNCTIONAL

## 2023-09-13 DEVICE — GWIRE FC ST BENT 0.035INX180CM: Type: IMPLANTABLE DEVICE | Status: FUNCTIONAL

## 2023-09-13 RX ORDER — DIVALPROEX SODIUM 500 MG/1
750 TABLET, DELAYED RELEASE ORAL
Qty: 0 | Refills: 0 | DISCHARGE

## 2023-09-13 RX ORDER — LOSARTAN POTASSIUM 100 MG/1
1 TABLET, FILM COATED ORAL
Qty: 0 | Refills: 0 | DISCHARGE

## 2023-09-13 RX ORDER — AMLODIPINE BESYLATE 2.5 MG/1
1 TABLET ORAL
Qty: 0 | Refills: 0 | DISCHARGE

## 2023-09-13 RX ORDER — ACETAMINOPHEN 500 MG
1000 TABLET ORAL ONCE
Refills: 0 | Status: DISCONTINUED | OUTPATIENT
Start: 2023-09-13 | End: 2023-09-13

## 2023-09-13 RX ORDER — ATORVASTATIN CALCIUM 80 MG/1
1 TABLET, FILM COATED ORAL
Qty: 0 | Refills: 0 | DISCHARGE

## 2023-09-13 RX ORDER — DIVALPROEX SODIUM 500 MG/1
2 TABLET, DELAYED RELEASE ORAL
Qty: 0 | Refills: 0 | DISCHARGE

## 2023-09-13 RX ORDER — ASPIRIN/CALCIUM CARB/MAGNESIUM 324 MG
1 TABLET ORAL
Qty: 0 | Refills: 0 | DISCHARGE

## 2023-09-13 RX ORDER — LACOSAMIDE 50 MG/1
1 TABLET ORAL
Qty: 0 | Refills: 0 | DISCHARGE

## 2023-09-13 RX ORDER — ATENOLOL 25 MG/1
1 TABLET ORAL
Qty: 0 | Refills: 0 | DISCHARGE

## 2023-09-13 RX ORDER — DIVALPROEX SODIUM 500 MG/1
1 TABLET, DELAYED RELEASE ORAL
Qty: 0 | Refills: 0 | DISCHARGE

## 2023-09-13 NOTE — PRE-ANESTHESIA EVALUATION ADULT - LAST STRESS TEST
Impression: Hordeolum internum right lower eyelid: H00.022. Plan: Recommend HC and LM BID x 10mins. EF 75%

## 2023-09-15 ENCOUNTER — APPOINTMENT (OUTPATIENT)
Dept: FAMILY MEDICINE | Facility: CLINIC | Age: 53
End: 2023-09-15
Payer: COMMERCIAL

## 2023-09-15 VITALS
WEIGHT: 140 LBS | OXYGEN SATURATION: 98 % | HEART RATE: 92 BPM | BODY MASS INDEX: 18.96 KG/M2 | DIASTOLIC BLOOD PRESSURE: 84 MMHG | HEIGHT: 72 IN | TEMPERATURE: 98.2 F | SYSTOLIC BLOOD PRESSURE: 145 MMHG

## 2023-09-15 DIAGNOSIS — I71.23 ANEURYSM OF THE DESCENDING THORACIC AORTA, WITHOUT RUPTURE: ICD-10-CM

## 2023-09-15 DIAGNOSIS — R53.81 OTHER MALAISE: ICD-10-CM

## 2023-09-15 DIAGNOSIS — Z82.49 FAMILY HISTORY OF ISCHEMIC HEART DISEASE AND OTHER DISEASES OF THE CIRCULATORY SYSTEM: ICD-10-CM

## 2023-09-15 DIAGNOSIS — G40.909 EPILEPSY, UNSPECIFIED, NOT INTRACTABLE, W/OUT STATUS EPILEPTICUS: ICD-10-CM

## 2023-09-15 DIAGNOSIS — S36.229A: ICD-10-CM

## 2023-09-15 DIAGNOSIS — I71.00 DISSECTION OF UNSPECIFIED SITE OF AORTA: ICD-10-CM

## 2023-09-15 DIAGNOSIS — R29.898 OTHER SYMPTOMS AND SIGNS INVOLVING THE MUSCULOSKELETAL SYSTEM: ICD-10-CM

## 2023-09-15 DIAGNOSIS — Z83.3 FAMILY HISTORY OF DIABETES MELLITUS: ICD-10-CM

## 2023-09-15 DIAGNOSIS — M79.89 OTHER SPECIFIED SOFT TISSUE DISORDERS: ICD-10-CM

## 2023-09-15 DIAGNOSIS — U07.1 COVID-19: ICD-10-CM

## 2023-09-15 PROBLEM — I26.99 OTHER PULMONARY EMBOLISM WITHOUT ACUTE COR PULMONALE: Chronic | Status: ACTIVE | Noted: 2023-09-13

## 2023-09-15 PROBLEM — I82.409 ACUTE EMBOLISM AND THROMBOSIS OF UNSPECIFIED DEEP VEINS OF UNSPECIFIED LOWER EXTREMITY: Chronic | Status: ACTIVE | Noted: 2023-09-13

## 2023-09-15 PROBLEM — K85.90 ACUTE PANCREATITIS WITHOUT NECROSIS OR INFECTION, UNSPECIFIED: Chronic | Status: ACTIVE | Noted: 2023-09-13

## 2023-09-15 PROCEDURE — 99386 PREV VISIT NEW AGE 40-64: CPT

## 2023-09-15 RX ORDER — ACETAMINOPHEN 325 MG/1
325 TABLET ORAL EVERY 6 HOURS
Qty: 240 | Refills: 0 | Status: ACTIVE | COMMUNITY

## 2023-09-15 RX ORDER — APIXABAN 5 MG (74)
5 KIT ORAL
Qty: 1 | Refills: 0 | Status: COMPLETED | COMMUNITY
Start: 2023-08-31 | End: 2023-09-15

## 2023-09-15 RX ORDER — MULTIVITAMIN
TABLET ORAL
Refills: 0 | Status: ACTIVE | COMMUNITY

## 2023-09-15 RX ORDER — ASPIRIN 81 MG/1
81 TABLET ORAL DAILY
Qty: 90 | Refills: 0 | Status: ACTIVE | COMMUNITY

## 2023-09-15 RX ORDER — LACOSAMIDE 200 MG/1
200 TABLET ORAL
Refills: 0 | Status: ACTIVE | COMMUNITY

## 2023-09-20 LAB — SURGICAL PATHOLOGY STUDY: SIGNIFICANT CHANGE UP

## 2023-09-29 ENCOUNTER — APPOINTMENT (OUTPATIENT)
Dept: HEART AND VASCULAR | Facility: CLINIC | Age: 53
End: 2023-09-29
Payer: COMMERCIAL

## 2023-09-29 VITALS
WEIGHT: 144.13 LBS | TEMPERATURE: 97.3 F | HEIGHT: 72 IN | OXYGEN SATURATION: 99 % | HEART RATE: 82 BPM | DIASTOLIC BLOOD PRESSURE: 81 MMHG | BODY MASS INDEX: 19.52 KG/M2 | SYSTOLIC BLOOD PRESSURE: 132 MMHG

## 2023-09-29 DIAGNOSIS — I50.32 CHRONIC DIASTOLIC (CONGESTIVE) HEART FAILURE: ICD-10-CM

## 2023-09-29 PROCEDURE — 99204 OFFICE O/P NEW MOD 45 MIN: CPT

## 2023-10-02 NOTE — PROGRESS NOTE ADULT - SUBJECTIVE AND OBJECTIVE BOX
I called and left a phone message.  I let him know that we have left messages for him to report to surgery today.  I explained that as long as he arrives by 12 noon, he would be fine.   INTERVAL HPI/OVERNIGHT EVENTS:    Patient was seen and bedside.  Went to OR for washout.  On CVVHD    ROS:    unable to obtain           ANTIBIOTICS/RELEVANT:    MEDICATIONS  (STANDING):  acetylcysteine 20%  Inhalation 4 milliLiter(s) Inhalation every 6 hours  albumin human 25% IVPB 50 milliLiter(s) IV Intermittent every 8 hours  artificial  tears Solution 1 Drop(s) Both EYES two times a day  chlorhexidine 0.12% Liquid 15 milliLiter(s) Oral Mucosa every 12 hours  chlorhexidine 2% Cloths 1 Application(s) Topical daily  chlorhexidine 4% Liquid 1 Application(s) Topical <User Schedule>  CRRT Treatment    <Continuous>  dexMEDEtomidine Infusion 0.2 MICROgram(s)/kG/Hr (3.5 mL/Hr) IV Continuous <Continuous>  dextrose 50% Injectable 25 Gram(s) IV Push once  fentaNYL   Infusion... 0.5 MICROgram(s)/kG/Hr (1.75 mL/Hr) IV Continuous <Continuous>  glucagon  Injectable 1 milliGRAM(s) IntraMuscular once  lacosamide IVPB 250 milliGRAM(s) IV Intermittent every 12 hours  lipid, fat emulsion (Plant Based) 20% Infusion 0.7 Gm/kG/Day (30.6 mL/Hr) IV Continuous <Continuous>  meropenem  IVPB 1000 milliGRAM(s) IV Intermittent every 8 hours  pantoprazole  Injectable 40 milliGRAM(s) IV Push daily  Parenteral Nutrition - Adult 1 Each (70 mL/Hr) TPN Continuous <Continuous>  Parenteral Nutrition - Adult 1 Each (70 mL/Hr) TPN Continuous <Continuous>  petrolatum Ophthalmic Ointment 1 Application(s) Both EYES daily  Phoxillum Filtration BK 4 / 2.5 5000 milliLiter(s) (1500 mL/Hr) CRRT <Continuous>  sodium chloride 3%  Inhalation 4 milliLiter(s) Inhalation every 6 hours    MEDICATIONS  (PRN):  dextrose Oral Gel 15 Gram(s) Oral once PRN Blood Glucose LESS THAN 70 milliGRAM(s)/deciliter  sodium chloride 0.9% lock flush 10 milliLiter(s) IV Push every 1 hour PRN Pre/post blood products, medications, blood draw, and to maintain line patency        Vital Signs Last 24 Hrs  T(C): 37 (02 Jun 2023 09:00), Max: 37 (02 Jun 2023 04:57)  T(F): 98.6 (02 Jun 2023 09:00), Max: 98.6 (02 Jun 2023 04:57)  HR: 122 (02 Jun 2023 12:00) (106 - 124)  BP: --  BP(mean): --  RR: 13 (02 Jun 2023 12:00) (10 - 23)  SpO2: 100% (02 Jun 2023 12:00) (97% - 100%)    Parameters below as of 02 Jun 2023 12:00  Patient On (Oxygen Delivery Method): BiPAP/CPAP        PHYSICAL EXAM:  Constitutional:   ill appearing   Eyes:IVAN, EOMI  Ear/Nose/Throat: no oral lesion, no sinus tenderness on percussion	  Neck:  supple  Respiratory: CTA marti  Cardiovascular: S1S2 RRR, no murmurs  Gastrointestinal:soft, (+) BS, no HSM  Extremities:no e/e/c  Vascular: DP Pulse:	right normal; left normal      LABS:                        9.2    14.41 )-----------( 69       ( 02 Jun 2023 05:06 )             26.4     06-02    134<L>  |  102  |  33<H>  ----------------------------<  115<H>  4.4   |  22  |  0.92    Ca    8.0<L>      02 Jun 2023 05:06  Phos  4.6     06-02  Mg     2.2     06-02    TPro  5.0<L>  /  Alb  2.3<L>  /  TBili  1.5<H>  /  DBili  1.0<H>  /  AST  23  /  ALT  14  /  AlkPhos  114  06-02    PT/INR - ( 02 Jun 2023 05:06 )   PT: 14.0 sec;   INR: 1.17          PTT - ( 02 Jun 2023 05:06 )  PTT:31.4 sec      MICROBIOLOGY:    Culture - Blood (05.31.23 @ 18:00)    Specimen Source: .Blood Blood   Culture Results:   No growth at 1 day.    Culture - Body Fluid with Gram Stain (05.30.23 @ 14:55)    Gram Stain:   No organisms seen  Few-moderate White blood cells   -  Ampicillin: R >16 These ampicillin results predict results for amoxicillin   -  Ampicillin/Sulbactam: I 16/8 Enterobacter, Klebsiella aerogenes, Citrobacter, and Serratia may develop resistance during prolonged therapy (3-4 days)   -  Cefazolin: S <=2 Enterobacter, Klebsiella aerogenes, Citrobacter, and Serratia may develop resistance during prolonged therapy (3-4 days)   -  Ceftriaxone: S <=1 Enterobacter, Klebsiella aerogenes, Citrobacter, and Serratia may develop resistance during prolonged therapy   -  Ciprofloxacin: S <=0.25   -  Ertapenem: S <=0.5   -  Gentamicin: S <=2   -  Piperacillin/Tazobactam: S <=8   -  Tobramycin: S <=2   -  Trimethoprim/Sulfamethoxazole: S <=0.5/9.5   Specimen Source: .Body Fluid LUQ Drain   Culture Results:   Rare Klebsiella pneumoniae   Organism Identification: Klebsiella pneumoniae   Organism: Klebsiella pneumoniae   Method Type: LIZETTE        RADIOLOGY & ADDITIONAL STUDIES:

## 2023-10-04 ENCOUNTER — NON-APPOINTMENT (OUTPATIENT)
Age: 53
End: 2023-10-04

## 2023-12-11 ENCOUNTER — APPOINTMENT (OUTPATIENT)
Dept: SURGICAL ONCOLOGY | Facility: CLINIC | Age: 53
End: 2023-12-11
Payer: COMMERCIAL

## 2023-12-11 VITALS
HEIGHT: 72 IN | DIASTOLIC BLOOD PRESSURE: 67 MMHG | TEMPERATURE: 98 F | SYSTOLIC BLOOD PRESSURE: 117 MMHG | OXYGEN SATURATION: 98 % | BODY MASS INDEX: 22.35 KG/M2 | WEIGHT: 165 LBS | HEART RATE: 60 BPM | RESPIRATION RATE: 16 BRPM

## 2023-12-11 PROCEDURE — 99213 OFFICE O/P EST LOW 20 MIN: CPT

## 2023-12-18 ENCOUNTER — APPOINTMENT (OUTPATIENT)
Dept: HEART AND VASCULAR | Facility: CLINIC | Age: 53
End: 2023-12-18

## 2024-01-02 ENCOUNTER — LABORATORY RESULT (OUTPATIENT)
Age: 54
End: 2024-01-02

## 2024-01-03 LAB
ALBUMIN SERPL ELPH-MCNC: 3.8 G/DL
ALP BLD-CCNC: 104 U/L
ALT SERPL-CCNC: 12 U/L
ANION GAP SERPL CALC-SCNC: 10 MMOL/L
AST SERPL-CCNC: 19 U/L
BASOPHILS # BLD AUTO: 0.05 K/UL
BASOPHILS NFR BLD AUTO: 1.2 %
BILIRUB DIRECT SERPL-MCNC: 0.1 MG/DL
BILIRUB INDIRECT SERPL-MCNC: 0.2 MG/DL
BILIRUB SERPL-MCNC: 0.3 MG/DL
BUN SERPL-MCNC: 19 MG/DL
CALCIUM SERPL-MCNC: 9.5 MG/DL
CHLORIDE SERPL-SCNC: 105 MMOL/L
CO2 SERPL-SCNC: 24 MMOL/L
CREAT SERPL-MCNC: 1.01 MG/DL
EGFR: 88 ML/MIN/1.73M2
EOSINOPHIL # BLD AUTO: 0.17 K/UL
EOSINOPHIL NFR BLD AUTO: 4 %
ESTIMATED AVERAGE GLUCOSE: 97 MG/DL
GLUCOSE SERPL-MCNC: 82 MG/DL
HBA1C MFR BLD HPLC: 5 %
HCT VFR BLD CALC: 34.5 %
HGB BLD-MCNC: 10.5 G/DL
IMM GRANULOCYTES NFR BLD AUTO: 0.2 %
INR PPP: 1 RATIO
LYMPHOCYTES # BLD AUTO: 1.36 K/UL
LYMPHOCYTES NFR BLD AUTO: 31.8 %
MAN DIFF?: NORMAL
MCHC RBC-ENTMCNC: 28 PG
MCHC RBC-ENTMCNC: 30.4 GM/DL
MCV RBC AUTO: 92 FL
MONOCYTES # BLD AUTO: 0.73 K/UL
MONOCYTES NFR BLD AUTO: 17.1 %
NEUTROPHILS # BLD AUTO: 1.96 K/UL
NEUTROPHILS NFR BLD AUTO: 45.7 %
PLATELET # BLD AUTO: 293 K/UL
POTASSIUM SERPL-SCNC: 4.5 MMOL/L
PROT SERPL-MCNC: 7 G/DL
PT BLD: 11.3 SEC
RBC # BLD: 3.75 M/UL
RBC # FLD: 14.6 %
SODIUM SERPL-SCNC: 140 MMOL/L
WBC # FLD AUTO: 4.28 K/UL

## 2024-01-09 ENCOUNTER — RX RENEWAL (OUTPATIENT)
Age: 54
End: 2024-01-09

## 2024-02-08 ENCOUNTER — RX RENEWAL (OUTPATIENT)
Age: 54
End: 2024-02-08

## 2024-02-14 NOTE — DISCHARGE NOTE NURSING/CASE MANAGEMENT/SOCIAL WORK - NSCORESITESY/N_GEN_A_CORE_RD
No
[de-identified] : Update 2/13/24: s/p FESS 2/7/24. Path consistent with chronic inflammation. Pt reports nasal congestion, sinus pain/pressure and headaches. Pt continues to use saline rinses 4x/day with continued mucus/blood/clots rinsing out. Pt reports feeling dizzy after using neti-pot with the sensation of fluid running into ears and ears feel full/clogged with intermittent popping sensation. Denies fevers/chills, s/s of infection, epistaxis.  Update 1/23/24: 9 year old female accompanied by mother. Hx borderline sweat test 11/27/23 and pending genetic testing, fu CRS. Pt scheduled for surgery 2/7/2024, reports cough has improved, nasal congestion persists. Pt using saline rinse and Flonase 2x/day.   9F presents for chronic sinusitis. She has borderline sweat test 11/17/23 and pending genetic testing for CF.  Allergy testing was positive for dust mites. Patient states symptoms started 2 years ago, complaining of nasal congestion, productive cough, and frequent infections. She has multiple bouts of PNA. Takes antibiotics (Omnicef) with only temporary relief.  Using saline irrigations BID followed by Flonase. She reports chronic congestion, thick PND. Sense of smell generally good.  CT Sinus 6/2/23 IMPRESSION: Mild to moderate mucosal thickening in the bilateral anterior ethmoid air cells and minimal mucosal thickening in the posterior ethmoid air cells. Mild mucosal thickening in the right sphenoid sinus and minimal mucosal thickening in the left sphenoid sinus.  Mild mucosal thickening in the right maxillary sinus and moderate mucosal thickening in the left maxillary sinus. Both infundibular channels are narrowed by mucosal disease and both hiatus semilunaris are narrowed by ethmoid bulla.  Nasal septal deviation. Bilateral conchal bullosa, opacified by mucosal disease and secretions.

## 2024-02-15 ENCOUNTER — INPATIENT (INPATIENT)
Facility: HOSPITAL | Age: 54
LOS: 0 days | Discharge: HOME | End: 2024-02-16
Attending: GENERAL ACUTE CARE HOSPITAL | Admitting: GENERAL ACUTE CARE HOSPITAL
Payer: MEDICAID

## 2024-02-15 VITALS
SYSTOLIC BLOOD PRESSURE: 123 MMHG | HEART RATE: 74 BPM | HEIGHT: 72 IN | WEIGHT: 177.03 LBS | TEMPERATURE: 99 F | OXYGEN SATURATION: 99 % | RESPIRATION RATE: 16 BRPM | DIASTOLIC BLOOD PRESSURE: 71 MMHG

## 2024-02-15 DIAGNOSIS — G40.919 EPILEPSY, UNSPECIFIED, INTRACTABLE, WITHOUT STATUS EPILEPTICUS: ICD-10-CM

## 2024-02-15 DIAGNOSIS — Z95.1 PRESENCE OF AORTOCORONARY BYPASS GRAFT: Chronic | ICD-10-CM

## 2024-02-15 DIAGNOSIS — E78.5 HYPERLIPIDEMIA, UNSPECIFIED: ICD-10-CM

## 2024-02-15 DIAGNOSIS — Z86.79 PERSONAL HISTORY OF OTHER DISEASES OF THE CIRCULATORY SYSTEM: ICD-10-CM

## 2024-02-15 DIAGNOSIS — I10 ESSENTIAL (PRIMARY) HYPERTENSION: ICD-10-CM

## 2024-02-15 DIAGNOSIS — Z86.718 PERSONAL HISTORY OF OTHER VENOUS THROMBOSIS AND EMBOLISM: ICD-10-CM

## 2024-02-15 DIAGNOSIS — Z41.9 ENCOUNTER FOR PROCEDURE FOR PURPOSES OTHER THAN REMEDYING HEALTH STATE, UNSPECIFIED: Chronic | ICD-10-CM

## 2024-02-15 DIAGNOSIS — Z95.828 PRESENCE OF OTHER VASCULAR IMPLANTS AND GRAFTS: Chronic | ICD-10-CM

## 2024-02-15 DIAGNOSIS — O10.019 PRE-EXISTING ESSENTIAL HYPERTENSION COMPLICATING PREGNANCY, UNSPECIFIED TRIMESTER: ICD-10-CM

## 2024-02-15 LAB
ALBUMIN SERPL ELPH-MCNC: 3.2 G/DL — LOW (ref 3.3–5)
ALP SERPL-CCNC: 96 U/L — SIGNIFICANT CHANGE UP (ref 40–120)
ALT FLD-CCNC: 19 U/L — SIGNIFICANT CHANGE UP (ref 12–78)
ANION GAP SERPL CALC-SCNC: 6 MMOL/L — SIGNIFICANT CHANGE UP (ref 5–17)
APTT BLD: 35.6 SEC — SIGNIFICANT CHANGE UP (ref 24.5–35.6)
AST SERPL-CCNC: 29 U/L — SIGNIFICANT CHANGE UP (ref 15–37)
BASOPHILS # BLD AUTO: 0.03 K/UL — SIGNIFICANT CHANGE UP (ref 0–0.2)
BASOPHILS NFR BLD AUTO: 0.4 % — SIGNIFICANT CHANGE UP (ref 0–2)
BILIRUB SERPL-MCNC: 0.4 MG/DL — SIGNIFICANT CHANGE UP (ref 0.2–1.2)
BUN SERPL-MCNC: 15 MG/DL — SIGNIFICANT CHANGE UP (ref 7–23)
CALCIUM SERPL-MCNC: 9.1 MG/DL — SIGNIFICANT CHANGE UP (ref 8.5–10.1)
CHLORIDE SERPL-SCNC: 108 MMOL/L — SIGNIFICANT CHANGE UP (ref 96–108)
CK SERPL-CCNC: 95 U/L — SIGNIFICANT CHANGE UP (ref 26–308)
CO2 SERPL-SCNC: 25 MMOL/L — SIGNIFICANT CHANGE UP (ref 22–31)
CREAT SERPL-MCNC: 0.77 MG/DL — SIGNIFICANT CHANGE UP (ref 0.5–1.3)
EGFR: 106 ML/MIN/1.73M2 — SIGNIFICANT CHANGE UP
EOSINOPHIL # BLD AUTO: 0.01 K/UL — SIGNIFICANT CHANGE UP (ref 0–0.5)
EOSINOPHIL NFR BLD AUTO: 0.1 % — SIGNIFICANT CHANGE UP (ref 0–6)
GLUCOSE SERPL-MCNC: 112 MG/DL — HIGH (ref 70–99)
HCT VFR BLD CALC: 29 % — LOW (ref 39–50)
HGB BLD-MCNC: 9.3 G/DL — LOW (ref 13–17)
IMM GRANULOCYTES NFR BLD AUTO: 0.3 % — SIGNIFICANT CHANGE UP (ref 0–0.9)
INR BLD: 1.06 RATIO — SIGNIFICANT CHANGE UP (ref 0.85–1.18)
LACTATE SERPL-SCNC: 1.1 MMOL/L — SIGNIFICANT CHANGE UP (ref 0.7–2)
LYMPHOCYTES # BLD AUTO: 0.98 K/UL — LOW (ref 1–3.3)
LYMPHOCYTES # BLD AUTO: 14.6 % — SIGNIFICANT CHANGE UP (ref 13–44)
MCHC RBC-ENTMCNC: 29.3 PG — SIGNIFICANT CHANGE UP (ref 27–34)
MCHC RBC-ENTMCNC: 32.1 G/DL — SIGNIFICANT CHANGE UP (ref 32–36)
MCV RBC AUTO: 91.5 FL — SIGNIFICANT CHANGE UP (ref 80–100)
MONOCYTES # BLD AUTO: 0.52 K/UL — SIGNIFICANT CHANGE UP (ref 0–0.9)
MONOCYTES NFR BLD AUTO: 7.7 % — SIGNIFICANT CHANGE UP (ref 2–14)
NEUTROPHILS # BLD AUTO: 5.16 K/UL — SIGNIFICANT CHANGE UP (ref 1.8–7.4)
NEUTROPHILS NFR BLD AUTO: 76.9 % — SIGNIFICANT CHANGE UP (ref 43–77)
NRBC # BLD: 0 /100 WBCS — SIGNIFICANT CHANGE UP (ref 0–0)
PLATELET # BLD AUTO: 289 K/UL — SIGNIFICANT CHANGE UP (ref 150–400)
POTASSIUM SERPL-MCNC: 4 MMOL/L — SIGNIFICANT CHANGE UP (ref 3.5–5.3)
POTASSIUM SERPL-SCNC: 4 MMOL/L — SIGNIFICANT CHANGE UP (ref 3.5–5.3)
PROT SERPL-MCNC: 7.6 GM/DL — SIGNIFICANT CHANGE UP (ref 6–8.3)
PROTHROM AB SERPL-ACNC: 12.6 SEC — SIGNIFICANT CHANGE UP (ref 9.5–13)
RBC # BLD: 3.17 M/UL — LOW (ref 4.2–5.8)
RBC # FLD: 15.6 % — HIGH (ref 10.3–14.5)
SODIUM SERPL-SCNC: 139 MMOL/L — SIGNIFICANT CHANGE UP (ref 135–145)
TROPONIN I, HIGH SENSITIVITY RESULT: 12.1 NG/L — SIGNIFICANT CHANGE UP
WBC # BLD: 6.72 K/UL — SIGNIFICANT CHANGE UP (ref 3.8–10.5)
WBC # FLD AUTO: 6.72 K/UL — SIGNIFICANT CHANGE UP (ref 3.8–10.5)

## 2024-02-15 PROCEDURE — 99253 IP/OBS CNSLTJ NEW/EST LOW 45: CPT

## 2024-02-15 PROCEDURE — 93010 ELECTROCARDIOGRAM REPORT: CPT

## 2024-02-15 PROCEDURE — 70450 CT HEAD/BRAIN W/O DYE: CPT | Mod: 26,MG

## 2024-02-15 PROCEDURE — G1004: CPT

## 2024-02-15 PROCEDURE — 99053 MED SERV 10PM-8AM 24 HR FAC: CPT

## 2024-02-15 PROCEDURE — 99285 EMERGENCY DEPT VISIT HI MDM: CPT

## 2024-02-15 PROCEDURE — 99223 1ST HOSP IP/OBS HIGH 75: CPT

## 2024-02-15 RX ORDER — LACOSAMIDE 50 MG/1
200 TABLET ORAL ONCE
Refills: 0 | Status: DISCONTINUED | OUTPATIENT
Start: 2024-02-15 | End: 2024-02-15

## 2024-02-15 RX ORDER — ACETAMINOPHEN 500 MG
650 TABLET ORAL EVERY 6 HOURS
Refills: 0 | Status: DISCONTINUED | OUTPATIENT
Start: 2024-02-15 | End: 2024-02-16

## 2024-02-15 RX ORDER — AMLODIPINE BESYLATE 2.5 MG/1
10 TABLET ORAL DAILY
Refills: 0 | Status: DISCONTINUED | OUTPATIENT
Start: 2024-02-15 | End: 2024-02-16

## 2024-02-15 RX ORDER — ENOXAPARIN SODIUM 100 MG/ML
40 INJECTION SUBCUTANEOUS EVERY 24 HOURS
Refills: 0 | Status: DISCONTINUED | OUTPATIENT
Start: 2024-02-15 | End: 2024-02-16

## 2024-02-15 RX ORDER — LACOSAMIDE 50 MG/1
200 TABLET ORAL
Refills: 0 | Status: DISCONTINUED | OUTPATIENT
Start: 2024-02-15 | End: 2024-02-16

## 2024-02-15 RX ORDER — APIXABAN 2.5 MG/1
1 TABLET, FILM COATED ORAL
Refills: 0 | DISCHARGE

## 2024-02-15 RX ORDER — PANTOPRAZOLE SODIUM 20 MG/1
40 TABLET, DELAYED RELEASE ORAL
Refills: 0 | Status: DISCONTINUED | OUTPATIENT
Start: 2024-02-15 | End: 2024-02-16

## 2024-02-15 RX ORDER — ATORVASTATIN CALCIUM 80 MG/1
20 TABLET, FILM COATED ORAL AT BEDTIME
Refills: 0 | Status: DISCONTINUED | OUTPATIENT
Start: 2024-02-15 | End: 2024-02-16

## 2024-02-15 RX ORDER — ASPIRIN/CALCIUM CARB/MAGNESIUM 324 MG
81 TABLET ORAL DAILY
Refills: 0 | Status: DISCONTINUED | OUTPATIENT
Start: 2024-02-15 | End: 2024-02-16

## 2024-02-15 RX ORDER — SODIUM CHLORIDE 9 MG/ML
1000 INJECTION INTRAMUSCULAR; INTRAVENOUS; SUBCUTANEOUS ONCE
Refills: 0 | Status: COMPLETED | OUTPATIENT
Start: 2024-02-15 | End: 2024-02-15

## 2024-02-15 RX ORDER — LEVETIRACETAM 250 MG/1
750 TABLET, FILM COATED ORAL
Refills: 0 | Status: DISCONTINUED | OUTPATIENT
Start: 2024-02-15 | End: 2024-02-16

## 2024-02-15 RX ORDER — METOPROLOL TARTRATE 50 MG
100 TABLET ORAL DAILY
Refills: 0 | Status: DISCONTINUED | OUTPATIENT
Start: 2024-02-15 | End: 2024-02-16

## 2024-02-15 RX ORDER — LANOLIN ALCOHOL/MO/W.PET/CERES
3 CREAM (GRAM) TOPICAL AT BEDTIME
Refills: 0 | Status: DISCONTINUED | OUTPATIENT
Start: 2024-02-15 | End: 2024-02-16

## 2024-02-15 RX ADMIN — PANTOPRAZOLE SODIUM 40 MILLIGRAM(S): 20 TABLET, DELAYED RELEASE ORAL at 13:18

## 2024-02-15 RX ADMIN — LACOSAMIDE 140 MILLIGRAM(S): 50 TABLET ORAL at 05:05

## 2024-02-15 RX ADMIN — Medication 81 MILLIGRAM(S): at 13:18

## 2024-02-15 RX ADMIN — ATORVASTATIN CALCIUM 20 MILLIGRAM(S): 80 TABLET, FILM COATED ORAL at 21:27

## 2024-02-15 RX ADMIN — SODIUM CHLORIDE 1000 MILLILITER(S): 9 INJECTION INTRAMUSCULAR; INTRAVENOUS; SUBCUTANEOUS at 03:28

## 2024-02-15 RX ADMIN — Medication 100 MILLIGRAM(S): at 18:32

## 2024-02-15 RX ADMIN — Medication 2 MILLIGRAM(S): at 02:55

## 2024-02-15 RX ADMIN — LACOSAMIDE 200 MILLIGRAM(S): 50 TABLET ORAL at 18:32

## 2024-02-15 RX ADMIN — ENOXAPARIN SODIUM 40 MILLIGRAM(S): 100 INJECTION SUBCUTANEOUS at 13:19

## 2024-02-15 RX ADMIN — AMLODIPINE BESYLATE 10 MILLIGRAM(S): 2.5 TABLET ORAL at 13:18

## 2024-02-15 RX ADMIN — Medication 1 TABLET(S): at 13:18

## 2024-02-15 NOTE — ED ADULT NURSE NOTE - NSICDXPASTMEDICALHX_GEN_ALL_CORE_FT
PAST MEDICAL HISTORY:  Aortic dissection     CAD (coronary artery disease)     DVT, lower extremity     HTN (hypertension)     Hypertension     Pancreatitis hospitalized for 5 months per spouse    Pulmonary embolism     Seizure disorder     Seizure disorder     Status post endovascular aneurysm repair (EVAR)

## 2024-02-15 NOTE — CONSULT NOTE ADULT - ASSESSMENT
It seems Pt did not have an adequate trial of levetiracetam (starting dose 500mg , increased to 750mg BID) before being switched to Depakote.  There was no intolerance to levetiracetam.  Depakote was D/C'ed because of pancreatitis.      It is possible he may have skipped a dose of lacosamide.     He has been on the maximum recommended dose regimen.  Addition of a second agent is indicated.      RECOMMENDATIONS    Routine EEG.      Continue lacosamide 200mg BID.    Add levetiracetam 750mg BID.       Jaylan Angulo M.D.   - Department of Neurology  [facility]

## 2024-02-15 NOTE — ED PROVIDER NOTE - OBJECTIVE STATEMENT
55 y/o M hx of seizures on vimpat 200mg BID presents for seizures. endorsing 2 seizures at home, witnessed by family member , 2 hours apart. states patient at baseline now. he is endorsing headache, generalized. states he was not feeling well all day today, last seizure was in may. was previously on keppra and depakote but did not tolerate those medications well. denies trauma/falls. denies chest pain/sob. denies abdominal pain.

## 2024-02-15 NOTE — ED ADULT NURSE NOTE - NSFALLRISKINTERV_ED_ALL_ED

## 2024-02-15 NOTE — H&P ADULT - ASSESSMENT
54 years old male with h/o HTN, HLD, h/o aortic dissection s/p repair, seizure disorder, h/o DVT ( treated with apxiaban now off AC), CAD was brought in to ED with witness seizure. Alise was noted to have one episode of seizure at around 10PM 2/14/24 ( witness by wife), lasted for 90 seconds. Patient declined to coome to hospital. Patient had another episode of seizure at around 1AM with urinary incontinence and postictal confusion. While in hospital, patient was noted to have one episode of seizure at around 5AM. Patient is following with Dr Delilah Hernandez ( [phone]) for seizure. Was on keppra and depakote which was changed to Vimpate around 1 years ago due to side effect. This is the first time patient have seizure since after started on Vimpat per wife. No alcohol use. Compliant with medication  Hemodynamically stable, afebrile, sat well at RA. EKG with NSR, LVH, nonspecific T wave changes. No leukocytosis, Hb 9.3, plt 289, K 4, Cr 0.77, hsTnT 12.1, lactate 1.1. CT head with no acute intracranial pathology, mass effect or infarct.

## 2024-02-15 NOTE — H&P ADULT - HISTORY OF PRESENT ILLNESS
54 years old male with h/o HTN, HLD, h/o aortic dissection s/p repair, seizure disorder, h/o DVT ( treated with apxiaban now off AC), CAD was brought in to ED with witness seizure. Alise was noted to have one episode of seizure at around 10PM 2/14/24 ( witness by wife), lasted for 90 seconds. Patient declined to coome to hospital. Patient had another episode of seizure at around 1AM with urinary incontinence and postictal confusion. While in hospital, patient was noted to have one episode of seizure at around 5AM. Patient is following with Dr Delilah Hernandez ( [phone]) for seizure. Was on keppra and depakote which was changed to Vimpate around 1 years ago due to side effect. This is the first time patient have seizure since after started on Vimpat per wife. No alcohol use. Compliant with medication  Hemodynamically stable, afebrile, sat well at RA. EKG with NSR, LVH, nonspecific T wave changes. No leukocytosis, Hb 9.3, plt 289, K 4, Cr 0.77, hsTnT 12.1, lactate 1.1. CT head with no acute intracranial pathology, mass effect or infarct.     SH: no toxic habits  FH: DM

## 2024-02-15 NOTE — ED ADULT NURSE NOTE - NSICDXPASTSURGICALHX_GEN_ALL_CORE_FT
PAST SURGICAL HISTORY:  Elective surgery IVC filter    S/P aortic bifurcation bypass graft     S/P CABG x 1

## 2024-02-15 NOTE — H&P ADULT - PROBLEM SELECTOR PLAN 1
3 episode of seizures ( 2 at home and one in hospital), lasted for 90 seconds associated with postictal confusion and urinary incontinence  CT head  ( I personally review) with no acute intracranial pathology, mass effect or infarct  Resume vimpat 200mg bid ( if cannot take oral, will then change to IV)  Seizure/aspiration precaution  Check EEG  Attempts made to call patient's neurologist Dr Delilah Hernandez office ( [phone]) left voice message with call back information  Neurology consulted- Dr Angulo

## 2024-02-15 NOTE — ED ADULT NURSE NOTE - HIV OFFER
Previously Declined (within the last year) Dermal Autograft Text: The defect edges were debeveled with a #15 scalpel blade.  Given the location of the defect, shape of the defect and the proximity to free margins a dermal autograft was deemed most appropriate.  Using a sterile surgical marker, the primary defect shape was transferred to the donor site. The area thus outlined was incised deep to adipose tissue with a #15 scalpel blade.  The harvested graft was then trimmed of adipose and epidermal tissue until only dermis was left.  The skin graft was then placed in the primary defect and oriented appropriately.

## 2024-02-15 NOTE — ED PROVIDER NOTE - PHYSICAL EXAMINATION
General: Well appearing male in no acute distress  HEENT: Normocephalic, atraumatic. Moist mucous membranes. Oropharynx clear. No lymphadenopathy.  Eyes: No scleral icterus. EOMI. IVAN.  Neck:. Soft and supple. Full ROM without pain. No midline tenderness  Cardiac: Regular rate and regular rhythm. No murmurs, rubs, gallops. Peripheral pulses 2+ and symmetric. No LE edema.  Resp: Lungs CTAB. Speaking in full sentences. No wheezes, rales or rhonchi.  Abd: Soft, non-tender, non-distended. No guarding or rebound. No scars, masses, or lesions.  Back: Spine midline and non-tender. No CVA tenderness.    Skin: No rashes, abrasions, or lacerations.  Neuro: AO x 3. Moves all extremities symmetrically. Motor strength and sensation grossly intact.

## 2024-02-15 NOTE — PATIENT PROFILE ADULT - FUNCTIONAL ASSESSMENT - DAILY ACTIVITY SCORE.
Patient notified of positive H pylori.  Will treat with Pylera and omeprazole.  Stool antigen 4-6 weeks following completion of treatment with holding any type of anti acid.  Patient verbalizes understanding.      Pathologic Diagnosis   Stomach, antrum, biopsy:   -Mild chronic gastritis with rare H. pylori organisms present by immunostain.          
18

## 2024-02-15 NOTE — ED ADULT NURSE NOTE - ED STAT RN HANDOFF DETAILS
report given to RN Madison Greer on 1C. pt stable and demonstrates no s/s of acute distress at this time. vitals recycled and doc accordingly. pt informed of change in RN and unit. pending transport to 1C. safety maintained.

## 2024-02-15 NOTE — ED ADULT NURSE NOTE - OBJECTIVE STATEMENT
Pt A&Ox4 at this time. pt s/p seizure at home witness by wife. pt placed on cardiac monitor and on seizure precautions. pmhx of seizure CHF HTM HLD, aortic dissection. As per wife witnessed 2 seizures 1 hr apart lasting "couldn't be more than 2mins", pt did not hit head or fall. at this time pt c/o nausea and headache, vss at this time. pt denies chest pain, difficulty breathing, sob, n/v/d, fever or chills, dizziness, numbness or tingling. pt allergic to keppra as per wife.

## 2024-02-15 NOTE — PATIENT PROFILE ADULT - STATED REASON FOR ADMISSION
Seizure
no loss of consciousness, no gait abnormality, no headache, no sensory deficits, and no weakness.

## 2024-02-15 NOTE — H&P ADULT - NSHPPHYSICALEXAM_GEN_ALL_CORE
CONSTITUTIONAL: alert and cooperative, no acute distress  EYES: PERRL, no scleral icterus  ENT: Mucosa moist, tongue normal  NECK: Neck supple, trachea midline, non-tender  CARDIAC: Normal S1 and S2. Regular rate and rhythms. Systolic murmur+. No Pedal edema  LUNGS: Equal air entry both lungs. No rales, rhonchi, wheezing. Normal respiratory effort.   ABDOMEN: Soft, nondistended, nontender. No guarding or rebound tenderness. No hepatomegaly or splenomegaly. Bowel sound normal.   MUSCULOSKELETAL: Normocephalic, atraumatic. No significant deformity or joint abnormality.  NEUROLOGICAL: No gross motor or sensory deficits.  SKIN: no lesions or eruptions. Normal turgor  PSYCHIATRIC: A&O x 3, appropriate mood and affect

## 2024-02-15 NOTE — ED ADULT TRIAGE NOTE - CHIEF COMPLAINT QUOTE
hx of seizure CHF HTM HLD, aortic dissection S/P Seizure x 2 at home witnessed by wife in bed 1 hr apart lasting "<90 seconds". C/O HA

## 2024-02-15 NOTE — PATIENT PROFILE ADULT - SURGICAL SITE DESCRIPTION
Current Issues/Problems reviewed on call: resources for Senior centers &  Counseling.     Details for Interventions/Education completed on call:  ACM spoke with patient's daughter Violet,  She said her father is doing ok.  He is isolating himself but family wants him to go out home and get involve in social interactions.      ACM suggested following resources to Violet for the senior centers:   [email or web address]  [email or web address]    ACM also suggested Violet that her dad may benefit from counseling.  ABDELRAHMAN provided the following resources. Violet verbalized understanding.     [email or web address]        Time Frame for Follow up:  Within within 1 week    Plan for Next Call: Senior centers & counseling referral.     Care Plan reviewed with Care Giver (patient's daughter Violet) and she agrees with the plan of care and the call follow up plan.      Care Plan Reviewed with Dr. Sandra Richardson  on 09/21/2022 via EMR     Aortic dissection

## 2024-02-15 NOTE — CHART NOTE - NSCHARTNOTEFT_GEN_A_CORE
This Chart note completes the immediately preceding Neurology Consult Note.      History per Admission H&P.    <Start of quote(s) from H&P>  “Reason for Admission: breakthrough seizure  History of Present Illness:   54 years old male with h/o HTN, HLD, h/o aortic dissection s/p repair, seizure disorder, h/o DVT ( treated with apxiaban now off AC), CAD was brought in to ED with witness seizure. Jertent was noted to have one episode of seizure at around 10PM 2/14/24 ( witness by wife), lasted for 90 seconds. Patient declined to coome to hospital. Patient had another episode of seizure at around 1AM with urinary incontinence and postictal confusion. While in hospital, patient was noted to have one episode of seizure at around 5AM. Patient is following with Dr Delilah Hernandez ( [phone]) for seizure. Was on keppra and depakote which was changed to Vimpate around 1 years ago due to side effect. This is the first time patient have seizure since after started on Vimpat per wife. No alcohol use. Compliant with medication  Hemodynamically stable, afebrile, sat well at RA. EKG with NSR, LVH, nonspecific T wave changes. No leukocytosis, Hb 9.3, plt 289, K 4, Cr 0.77, hsTnT 12.1, lactate 1.1. CT head with no acute intracranial pathology, mass effect or infarct.     SH: no toxic habits   . . .     Review of Systems: All ROS were negative except generalized weakness, seizure   . . .     Home Medications:   * Patient Currently Takes Medications as of 13-Sep-2023 12:34 documented in Structured Notes  · 	Multiple Vitamins oral tablet: 1 tab(s) orally once a day  · 	calamine topical lotion: Apply topically to affected area every 12 hours apply to buttocks  · 	lacosamide 200 mg oral tablet: 1 tab(s) orally every 12 hours MDD: 2 tabs  · 	atorvastatin 20 mg oral tablet: 1 tab(s) orally once a day  · 	Toprol- mg oral tablet, extended release: 1 tab(s) orally once a day  · 	Aspirin Enteric Coated 81 mg oral delayed release tablet: 1 tab(s) orally once a day  · 	pantoprazole 40 mg oral delayed release tablet: 1 tab(s) orally once a day (before a meal)  · 	amLODIPine 10 mg oral tablet: 1 orally once a day   . . .   · Substance use	No”  <End of quote(s) from H&P>    On my interviewing Pt’s wife present at the bedside I note in particular or in addition   OR INSTEAD  as follows:    Around 10 PM last night at home Pt trembled for a few seconds, while seated.  Around midnight at home he drooled, rolled his eyes back, and shook for < one minute.  She avers he did not have a seizure in the ED.        Awake, alert.  Grossly normal comprehension/expression/articulation.  PERRL; visual fields grossly intact; EOMI.  Normal facial/lingual movements.      No grossly demonstrable focal/lateralized limb motor deficits.  Normactive DTRs; plantars flexor.      P/LT se

## 2024-02-15 NOTE — ED PROVIDER NOTE - CLINICAL SUMMARY MEDICAL DECISION MAKING FREE TEXT BOX
55 y/o M hx of seizures on vimpat 200mg BID presents for seizures. endorsing 2 seizures at home, witnessed by family member , 2 hours apart. states patient at baseline now. he is endorsing headache, generalized. states he was not feeling well all day today, last seizure was in may. was previously on keppra and depakote but did not tolerate those medications well. denies trauma/falls. denies chest pain/sob. denies nausea/vomiting.   patient w/ seizure here in ER, patient began to blink eyes and stare off w/o any response, then had convulsions, lasted seconds in nature.   ativan 2mg IVP given.   200mg IV given for seizure.   CT head given multiple seizures in several hour span. return to baseline in between seizures, does not appear to be status epilepticus. 53 y/o M hx of seizures on vimpat 200mg BID presents for seizures. endorsing 2 seizures at home, witnessed by family member , 2 hours apart. states patient at baseline now. he is endorsing headache, generalized. states he was not feeling well all day today, last seizure was in may. was previously on keppra and depakote but did not tolerate those medications well. denies trauma/falls. denies chest pain/sob. denies nausea/vomiting.   patient w/ seizure here in ER, patient began to blink eyes and stare off w/o any response, then had convulsions, lasted seconds in nature.   ativan 2mg IVP given.   200mg IV vimpat given for seizure.   CT head given multiple seizures in several hour span. return to baseline in between seizures, does not appear to be status epilepticus.  admitted to medicine.

## 2024-02-15 NOTE — PATIENT PROFILE ADULT - FALL HARM RISK - RISK INTERVENTIONS

## 2024-02-16 ENCOUNTER — TRANSCRIPTION ENCOUNTER (OUTPATIENT)
Age: 54
End: 2024-02-16

## 2024-02-16 VITALS
TEMPERATURE: 99 F | OXYGEN SATURATION: 100 % | SYSTOLIC BLOOD PRESSURE: 129 MMHG | DIASTOLIC BLOOD PRESSURE: 74 MMHG | RESPIRATION RATE: 18 BRPM | HEART RATE: 60 BPM

## 2024-02-16 LAB
ALBUMIN SERPL ELPH-MCNC: 3.1 G/DL — LOW (ref 3.3–5)
ALP SERPL-CCNC: 92 U/L — SIGNIFICANT CHANGE UP (ref 40–120)
ALT FLD-CCNC: 17 U/L — SIGNIFICANT CHANGE UP (ref 12–78)
AMPHET UR-MCNC: NEGATIVE — SIGNIFICANT CHANGE UP
ANION GAP SERPL CALC-SCNC: 2 MMOL/L — LOW (ref 5–17)
AST SERPL-CCNC: 22 U/L — SIGNIFICANT CHANGE UP (ref 15–37)
BARBITURATES UR SCN-MCNC: NEGATIVE — SIGNIFICANT CHANGE UP
BENZODIAZ UR-MCNC: NEGATIVE — SIGNIFICANT CHANGE UP
BILIRUB SERPL-MCNC: 0.6 MG/DL — SIGNIFICANT CHANGE UP (ref 0.2–1.2)
BUN SERPL-MCNC: 12 MG/DL — SIGNIFICANT CHANGE UP (ref 7–23)
CALCIUM SERPL-MCNC: 9.1 MG/DL — SIGNIFICANT CHANGE UP (ref 8.5–10.1)
CHLORIDE SERPL-SCNC: 110 MMOL/L — HIGH (ref 96–108)
CO2 SERPL-SCNC: 31 MMOL/L — SIGNIFICANT CHANGE UP (ref 22–31)
COCAINE METAB.OTHER UR-MCNC: NEGATIVE — SIGNIFICANT CHANGE UP
CREAT SERPL-MCNC: 0.66 MG/DL — SIGNIFICANT CHANGE UP (ref 0.5–1.3)
EGFR: 111 ML/MIN/1.73M2 — SIGNIFICANT CHANGE UP
GLUCOSE SERPL-MCNC: 89 MG/DL — SIGNIFICANT CHANGE UP (ref 70–99)
HCT VFR BLD CALC: 31.5 % — LOW (ref 39–50)
HGB BLD-MCNC: 10 G/DL — LOW (ref 13–17)
MAGNESIUM SERPL-MCNC: 2.2 MG/DL — SIGNIFICANT CHANGE UP (ref 1.6–2.6)
MCHC RBC-ENTMCNC: 29.3 PG — SIGNIFICANT CHANGE UP (ref 27–34)
MCHC RBC-ENTMCNC: 31.7 G/DL — LOW (ref 32–36)
MCV RBC AUTO: 92.4 FL — SIGNIFICANT CHANGE UP (ref 80–100)
METHADONE UR-MCNC: NEGATIVE — SIGNIFICANT CHANGE UP
NRBC # BLD: 0 /100 WBCS — SIGNIFICANT CHANGE UP (ref 0–0)
OPIATES UR-MCNC: NEGATIVE — SIGNIFICANT CHANGE UP
PCP SPEC-MCNC: SIGNIFICANT CHANGE UP
PCP UR-MCNC: NEGATIVE — SIGNIFICANT CHANGE UP
PHOSPHATE SERPL-MCNC: 4.4 MG/DL — SIGNIFICANT CHANGE UP (ref 2.5–4.5)
PLATELET # BLD AUTO: 265 K/UL — SIGNIFICANT CHANGE UP (ref 150–400)
POTASSIUM SERPL-MCNC: 3.8 MMOL/L — SIGNIFICANT CHANGE UP (ref 3.5–5.3)
POTASSIUM SERPL-SCNC: 3.8 MMOL/L — SIGNIFICANT CHANGE UP (ref 3.5–5.3)
PROT SERPL-MCNC: 7.3 GM/DL — SIGNIFICANT CHANGE UP (ref 6–8.3)
RBC # BLD: 3.41 M/UL — LOW (ref 4.2–5.8)
RBC # FLD: 15.4 % — HIGH (ref 10.3–14.5)
SODIUM SERPL-SCNC: 143 MMOL/L — SIGNIFICANT CHANGE UP (ref 135–145)
THC UR QL: POSITIVE — SIGNIFICANT CHANGE UP
WBC # BLD: 5.98 K/UL — SIGNIFICANT CHANGE UP (ref 3.8–10.5)
WBC # FLD AUTO: 5.98 K/UL — SIGNIFICANT CHANGE UP (ref 3.8–10.5)

## 2024-02-16 PROCEDURE — 99239 HOSP IP/OBS DSCHRG MGMT >30: CPT

## 2024-02-16 PROCEDURE — 95816 EEG AWAKE AND DROWSY: CPT | Mod: 26

## 2024-02-16 RX ORDER — METOPROLOL TARTRATE 50 MG
1 TABLET ORAL
Qty: 30 | Refills: 0
Start: 2024-02-16 | End: 2024-03-16

## 2024-02-16 RX ORDER — LEVETIRACETAM 250 MG/1
1 TABLET, FILM COATED ORAL
Qty: 60 | Refills: 0
Start: 2024-02-16 | End: 2024-03-16

## 2024-02-16 RX ORDER — AMLODIPINE BESYLATE 2.5 MG/1
1 TABLET ORAL
Qty: 30 | Refills: 0
Start: 2024-02-16 | End: 2024-03-16

## 2024-02-16 RX ORDER — PANTOPRAZOLE SODIUM 20 MG/1
1 TABLET, DELAYED RELEASE ORAL
Qty: 30 | Refills: 0
Start: 2024-02-16 | End: 2024-03-16

## 2024-02-16 RX ORDER — AMLODIPINE BESYLATE 2.5 MG/1
1 TABLET ORAL
Refills: 0 | DISCHARGE

## 2024-02-16 RX ORDER — LACOSAMIDE 50 MG/1
1 TABLET ORAL
Qty: 60 | Refills: 0
Start: 2024-02-16 | End: 2024-03-16

## 2024-02-16 RX ORDER — ATORVASTATIN CALCIUM 80 MG/1
1 TABLET, FILM COATED ORAL
Qty: 30 | Refills: 0
Start: 2024-02-16 | End: 2024-03-16

## 2024-02-16 RX ADMIN — Medication 650 MILLIGRAM(S): at 01:55

## 2024-02-16 RX ADMIN — Medication 81 MILLIGRAM(S): at 12:45

## 2024-02-16 RX ADMIN — PANTOPRAZOLE SODIUM 40 MILLIGRAM(S): 20 TABLET, DELAYED RELEASE ORAL at 05:18

## 2024-02-16 RX ADMIN — LEVETIRACETAM 750 MILLIGRAM(S): 250 TABLET, FILM COATED ORAL at 05:19

## 2024-02-16 RX ADMIN — ENOXAPARIN SODIUM 40 MILLIGRAM(S): 100 INJECTION SUBCUTANEOUS at 12:45

## 2024-02-16 RX ADMIN — LACOSAMIDE 200 MILLIGRAM(S): 50 TABLET ORAL at 17:08

## 2024-02-16 RX ADMIN — AMLODIPINE BESYLATE 10 MILLIGRAM(S): 2.5 TABLET ORAL at 05:19

## 2024-02-16 RX ADMIN — Medication 1 TABLET(S): at 12:45

## 2024-02-16 RX ADMIN — Medication 650 MILLIGRAM(S): at 01:17

## 2024-02-16 RX ADMIN — Medication 3 MILLIGRAM(S): at 01:17

## 2024-02-16 RX ADMIN — LEVETIRACETAM 750 MILLIGRAM(S): 250 TABLET, FILM COATED ORAL at 17:08

## 2024-02-16 RX ADMIN — Medication 100 MILLIGRAM(S): at 05:19

## 2024-02-16 RX ADMIN — LACOSAMIDE 200 MILLIGRAM(S): 50 TABLET ORAL at 05:18

## 2024-02-16 NOTE — DISCHARGE NOTE PROVIDER - HOSPITAL COURSE
54 years old male with h/o HTN, HLD, h/o aortic dissection s/p repair, seizure disorder, h/o DVT ( treated with apxiaban now off AC), CAD was brought in to ED with witness seizure. Alise was noted to have one episode of seizure at around 10PM 2/14/24 ( witness by wife), lasted for 90 seconds. Patient declined to coome to hospital. Patient had another episode of seizure at around 1AM with urinary incontinence and postictal confusion. While in hospital, patient was noted to have one episode of seizure at around 5AM. Patient is following with Dr Delilah Hernandez ( [phone]) for seizure. Was on keppra and depakote which was changed to Vimpate around 1 years ago due to side effect. This is the first time patient have seizure since after started on Vimpat per wife. No alcohol use. Compliant with medication  Hemodynamically stable, afebrile, sat well at RA. EKG with NSR, LVH, nonspecific T wave changes. No leukocytosis, Hb 9.3, plt 289, K 4, Cr 0.77, hsTnT 12.1, lactate 1.1. CT head with no acute intracranial pathology, mass effect or infarct.     Vital Signs Last 24 Hrs  T(C): 36.8 (16 Feb 2024 10:55), Max: 37.1 (15 Feb 2024 17:12)  T(F): 98.3 (16 Feb 2024 10:55), Max: 98.7 (15 Feb 2024 17:12)  HR: 55 (16 Feb 2024 10:55) (55 - 70)  BP: 127/69 (16 Feb 2024 10:55) (127/69 - 156/68)  BP(mean): --  RR: 18 (16 Feb 2024 10:55) (17 - 18)  SpO2: 98% (16 Feb 2024 10:55) (97% - 100%)    Parameters below as of 16 Feb 2024 10:55  Patient On (Oxygen Delivery Method): room air  GENERAL: NAD,  no increased WOB  NERVOUS SYSTEM:  Alert & Oriented X3, Good concentration; nonfocal   CHEST/LUNG: CTAB;  No rales, rhonchi, wheezing, or rubs; chest scar well heeled   HEART: Regular rate and rhythm; No murmurs, rubs, or gallops  ABDOMEN: Soft, Nontender, Nondistended; Bowel sounds present; midline scars well heeled   EXTREMITIES:  2+ Peripheral Pulses b/l, No clubbing, cyanosis, calf tenderness or edema b/l              Breakthrough seizure.   -- 3 episode of seizures ( 2 at home and one in hospital), lasted for 90 seconds associated with postictal confusion and urinary incontinence  CT head with no acute intracranial pathology, mass effect or infarct  Seizure/aspiration precaution  seen by neurology : Continue lacosamide 200mg BID, Added levetiracetam 750mg BID  back to baseline MS, AOx3   Follows with Dr. David FAULKNER  Neurologist in [place], [place]  Address: [address] [place] [address], [place] [address]  Phone: [phone]    education on avoidance of driving, operating heavy machinery, showering alone or leaving the house alone , etc.  medication compliance reinforced       Benign essential HTN.   continue with BB, amlodipine         Hyperlipidemia, unspecified.   continue with  statin.      History of DVT (deep vein thrombosis).    s/p IVC filter then removed. Treated with apixaban, now off AC. Only on aspirin.     History of aortic dissection.   ·  Plan: s/p repair  Continue outpatient follow up with cardiothoracic surgery per routine     normocytic anemia   no e/o bleeding or bruising   outpatient follow-up, work-up with PMD   H/H stable     Discharge time : 40 min   RETURN PARAMETERS DISCUSSED WITH PATIENT, PATIENT EXPRESSED UNDERSTANDING AND IS AGREEABLE. DISCUSSED WITH PATIENT ON REFRAINING FROM DRIVING UNTIL FOLLOW-UP/ CLEARED BY PMD. PATIENT EXPRESSED UNDERSTANDING.   Care plan and all findings were discussed in detail with patient.  All questions and concerns addressed 54 years old male with h/o HTN, HLD, h/o aortic dissection s/p repair, seizure disorder, h/o DVT ( treated with apxiaban now off AC), CAD was brought in to ED with witnessed breakthrough seizure. Patient was noted to have one episode of seizure at around 10PM 2/14/24 ( witness by wife), lasted for 90 seconds. Patient declined to coome to hospital. Patient had another episode of seizure at around 1AM with urinary incontinence and postictal confusion. While in hospital, patient was noted to have one episode of seizure at around 5AM. Patient is following with Dr Delilah eHrnandez ( [phone]) for seizure. Was on keppra and depakote which was changed to Vimpate around 1 years ago due to side effect. This is the first time patient have seizure since after started on Vimpat per wife. No alcohol use. Compliant with medication  Hemodynamically stable, afebrile, sat well at RA. EKG with NSR, LVH, nonspecific T wave changes. No leukocytosis, Hb 9.3, plt 289, K 4, Cr 0.77, hsTnT 12.1, lactate 1.1. CT head with no acute intracranial pathology, mass effect or infarct. No e/o infection.     Vital Signs Last 24 Hrs  T(C): 36.8 (16 Feb 2024 10:55), Max: 37.1 (15 Feb 2024 17:12)  T(F): 98.3 (16 Feb 2024 10:55), Max: 98.7 (15 Feb 2024 17:12)  HR: 55 (16 Feb 2024 10:55) (55 - 70)  BP: 127/69 (16 Feb 2024 10:55) (127/69 - 156/68)  BP(mean): --  RR: 18 (16 Feb 2024 10:55) (17 - 18)  SpO2: 98% (16 Feb 2024 10:55) (97% - 100%)    Parameters below as of 16 Feb 2024 10:55  Patient On (Oxygen Delivery Method): room air  GENERAL: NAD,  no increased WOB  NERVOUS SYSTEM:  Alert & Oriented X3, Good concentration; nonfocal   CHEST/LUNG: CTAB;  No rales, rhonchi, wheezing, or rubs; chest scar well heeled   HEART: Regular rate and rhythm; No murmurs, rubs, or gallops  ABDOMEN: Soft, Nontender, Nondistended; Bowel sounds present; midline scars well heeled   EXTREMITIES:  2+ Peripheral Pulses b/l, No clubbing, cyanosis, calf tenderness or edema b/l              Breakthrough seizure.   -- 3 episode of seizures ( 2 at home and one in hospital), lasted for 90 seconds associated with postictal confusion and urinary incontinence  CT head with no acute intracranial pathology, mass effect or infarct  EEG: No epileptiform abnormalities are captured.   utox + THC   Seizure/aspiration precaution  seen by neurology : Continue lacosamide 200mg BID, Rx  levetiracetam 750mg BID  back to baseline MS, AOx3   Follows with Dr. David FAULKNER  Neurologist in [place], [place]  Address: [address] [place] [address]  Phone: [phone]    education on avoidance of driving, operating heavy machinery, showering alone or leaving the house alone , etc.  medication compliance reinforced   THC cessation counseling provided       Benign essential HTN.   continue with BB, amlodipine         Hyperlipidemia, unspecified.   continue with  statin.      History of DVT (deep vein thrombosis).    s/p IVC filter then removed. Treated with apixaban, now off AC. Only on aspirin.     History of aortic dissection.   ·  Plan: s/p repair  Continue outpatient follow up with cardiothoracic surgery per routine     normocytic anemia   no e/o bleeding or bruising   outpatient follow-up, work-up with PMD   H/H stable     Discharge time : 40 min   RETURN PARAMETERS DISCUSSED WITH PATIENT, PATIENT EXPRESSED UNDERSTANDING AND IS AGREEABLE. DISCUSSED WITH PATIENT ON REFRAINING FROM DRIVING UNTIL FOLLOW-UP/ CLEARED BY PMD. PATIENT EXPRESSED UNDERSTANDING.   Care plan and all findings were discussed in detail with patient.  All questions and concerns addressed

## 2024-02-16 NOTE — DIETITIAN INITIAL EVALUATION ADULT - PERTINENT MEDS FT
MEDICATIONS  (STANDING):  amLODIPine   Tablet 10 milliGRAM(s) Oral daily  aspirin enteric coated 81 milliGRAM(s) Oral daily  atorvastatin 20 milliGRAM(s) Oral at bedtime  enoxaparin Injectable 40 milliGRAM(s) SubCutaneous every 24 hours  lacosamide 200 milliGRAM(s) Oral two times a day  levETIRAcetam 750 milliGRAM(s) Oral two times a day  metoprolol succinate  milliGRAM(s) Oral daily  multivitamin 1 Tablet(s) Oral daily  pantoprazole    Tablet 40 milliGRAM(s) Oral before breakfast    MEDICATIONS  (PRN):  acetaminophen     Tablet .. 650 milliGRAM(s) Oral every 6 hours PRN Mild Pain (1 - 3), Moderate Pain (4 - 6)  melatonin 3 milliGRAM(s) Oral at bedtime PRN Insomnia

## 2024-02-16 NOTE — DISCHARGE NOTE PROVIDER - NSDCMRMEDTOKEN_GEN_ALL_CORE_FT
amLODIPine 10 mg oral tablet: 1 tab(s) orally once a day  Aspirin Enteric Coated 81 mg oral delayed release tablet: 1 tab(s) orally once a day  atorvastatin 20 mg oral tablet: 1 tab(s) orally once a day (at bedtime)  lacosamide 200 mg oral tablet: 1 tab(s) orally 2 times a day MDD: 2 tabs  levETIRAcetam 750 mg oral tablet: 1 tab(s) orally 2 times a day  Multiple Vitamins oral tablet: 1 tab(s) orally once a day  pantoprazole 40 mg oral delayed release tablet: 1 tab(s) orally once a day (before a meal)  Toprol- mg oral tablet, extended release: 1 tab(s) orally once a day

## 2024-02-16 NOTE — DIETITIAN INITIAL EVALUATION ADULT - DIET TYPE
Ensure Plus High Protein x 2/day (provides 700 kcal, 40 g protein)/DASH/TLC (sodium and cholesterol restricted diet)/supplement (specify)

## 2024-02-16 NOTE — DISCHARGE NOTE PROVIDER - NSDCFUADDAPPT_GEN_ALL_CORE_FT
It is important to see your primary physician as well as other necessary consultants within the next week to perform a comprehensive medical review.  Call their offices for an appointment as soon as you leave the hospital.  If you do not have a primary physician or cant reach him/her, contact the [facility] Physician Referral Service at [phone].  Your medical issues appear to be stable at this time, but if your symptoms recur or worsen, contact your physicians and/or return to the hospital if necessary.  If you encounter any issues or questions with your medication, call your physicians before stopping the medication.

## 2024-02-16 NOTE — DIETITIAN INITIAL EVALUATION ADULT - PROBLEM SELECTOR PLAN 1
3 episode of seizures ( 2 at home and one in hospital), lasted for 90 seconds associated with postictal confusion and urinary incontinence  CT head  ( I personally review) with no acute intracranial pathology, mass effect or infarct  Resume vimpat 200mg bid ( if cannot take oral, will then change to IV)  Seizure/aspiration precaution  Check EEG  Attempts made to call patient's neurologist Dr Delilah Hernandez office ( [phone]) left voice message with call back information  Neurology consulted- Dr Angulo

## 2024-02-16 NOTE — DISCHARGE NOTE PROVIDER - NSDCFUSCHEDAPPT_GEN_ALL_CORE_FT
[facility]  [facility] [address]  Scheduled Appointment: 03/08/2024

## 2024-02-16 NOTE — DISCHARGE NOTE PROVIDER - PROVIDER TOKENS
PROVIDER:[TOKEN:[[id number]],FOLLOWUP:[1 week]],FREE:[LAST:[your neurologist],PHONE:[(   )    -],FAX:[(   )    -],ADDRESS:[Follows with Dr. David FAULKNER  Neurologist in   Address: [address] [place] [address]  Phone: [phone]],FOLLOWUP:[1 week]]

## 2024-02-16 NOTE — DISCHARGE NOTE PROVIDER - CARE PROVIDERS DIRECT ADDRESSES
,[email or web address]

## 2024-02-16 NOTE — DIETITIAN INITIAL EVALUATION ADULT - REASON INDICATOR FOR ASSESSMENT
MST score 2 or > ( recently lost weight without trying ; eating poorly because of decreased appetite)

## 2024-02-16 NOTE — DISCHARGE NOTE PROVIDER - CARE PROVIDER_API CALL
[facility]  [address], [place] [address]  Phone: [phone]  Fax: [phone]  Follow Up Time: 1 week    your neurologist,   Follows with Dr. David FAULKNER  Neurologist in [place], [place]  Address: [address] [place] [address]  Phone: [phone]  Phone: (   )    -  Fax: (   )    -  Follow Up Time: 1 week

## 2024-02-16 NOTE — DISCHARGE NOTE PROVIDER - NSDCCPCAREPLAN_GEN_ALL_CORE_FT
PRINCIPAL DISCHARGE DIAGNOSIS  Diagnosis: Breakthrough seizure  Assessment and Plan of Treatment: take vimpat and keppra as prescribed twice daily. follow with your neurologist within 1 week      SECONDARY DISCHARGE DIAGNOSES  Diagnosis: Hyperlipidemia, unspecified  Assessment and Plan of Treatment:     Diagnosis: History of DVT (deep vein thrombosis)  Assessment and Plan of Treatment:     Diagnosis: History of aortic dissection  Assessment and Plan of Treatment:     Diagnosis: Benign essential HTN  Assessment and Plan of Treatment:

## 2024-02-16 NOTE — DIETITIAN INITIAL EVALUATION ADULT - OTHER INFO
Pt seen on medical floor, adm w/ breakthrough seizure ; benign essential HTN ; h/o DVT / Aortic dissection. Pt lives w/his wife and family. They both do the food shopping, he does the cooking. Denies N/V/D/C/Chewing/Swallowing issues, No food allergies. PTA pt was drinking chocolate ensure 2 x/d. Educated and provided pt w/ written literature on High calorie high protein nutrition therapy and encouraged small frequent meals. Pt reports wt loss x 1 year. UBW = 220 #, reportedly from lengthy Hospitalizations.

## 2024-02-16 NOTE — DISCHARGE NOTE NURSING/CASE MANAGEMENT/SOCIAL WORK - NSDCFUADDAPPT_GEN_ALL_CORE_FT
It is important to see your primary physician as well as other necessary consultants within the next week to perform a comprehensive medical review.  Call their offices for an appointment as soon as you leave the hospital.  If you do not have a primary physician or cant reach him/her, contact the [facility] Physician Referral Service at [phone].  Your medical issues appear to be stable at this time, but if your symptoms recur or worsen, contact your physicians and/or return to the hospital if necessary.  If you encounter any issues or questions with your medication, call your physicians before stopping the medication.

## 2024-02-16 NOTE — DIETITIAN INITIAL EVALUATION ADULT - PERTINENT LABORATORY DATA
02-16    143  |  110<H>  |  12  ----------------------------<  89  3.8   |  31  |  0.66    Ca    9.1      16 Feb 2024 08:40  Phos  4.4     02-16  Mg     2.2     02-16    TPro  7.3  /  Alb  3.1<L>  /  TBili  0.6  /  DBili  x   /  AST  22  /  ALT  17  /  AlkPhos  92  02-16  A1C with Estimated Average Glucose Result: 5.3 % (05-05-23 @ 07:25)  A1C with Estimated Average Glucose Result: 5.2 % (04-28-23 @ 05:54)  A1C with Estimated Average Glucose Result: 5.3 % (04-13-23 @ 17:08)

## 2024-02-16 NOTE — EEG REPORT - NS EEG TEXT BOX
VINCENT MARIE [id number]     Study Date: 02-16-24  Duration: 20 min  --------------------------------------------------------------------------------------------------  History:  CC/ HPI Patient is a 54y old  Male who presents with a chief complaint of breakthrough seizure (15 Feb 2024 18:41)    MEDICATIONS  (STANDING):  amLODIPine   Tablet 10 milliGRAM(s) Oral daily  aspirin enteric coated 81 milliGRAM(s) Oral daily  atorvastatin 20 milliGRAM(s) Oral at bedtime  enoxaparin Injectable 40 milliGRAM(s) SubCutaneous every 24 hours  lacosamide 200 milliGRAM(s) Oral two times a day  levETIRAcetam 750 milliGRAM(s) Oral two times a day  metoprolol succinate  milliGRAM(s) Oral daily  multivitamin 1 Tablet(s) Oral daily  pantoprazole    Tablet 40 milliGRAM(s) Oral before breakfast    --------------------------------------------------------------------------------------------------  Study Interpretation:    [[[Abbreviation Key:  PDR=alpha rhythm/posterior dominant rhythm. A-P=anterior posterior.  Amplitude: ‘very low’:<20; ‘low’:20-49; ‘medium’:; ‘high’:>150uV.  Persistence for periodic/rhythmic patterns (% of epoch) ‘rare’:<1%; ‘occasional’:1-10%; ‘frequent’:10-50%; ‘abundant’:50-90%; ‘continuous’:>90%.  Persistence for sporadic discharges: ‘rare’:<1/hr; ‘occasional’:1/min-1/hr; ‘frequent’:>1/min; ‘abundant’:>1/10 sec.  RPP=rhythmic and periodic patterns; GRDA=generalized rhythmic delta activity; FIRDA=frontal intermittent GRDA; LRDA=lateralized rhythmic delta activity; TIRDA=temporal intermittent rhythmic delta activity;  LPD=PLED=lateralized periodic discharges; GPD=generalized periodic discharges; BIPDs =bilateral independent periodic discharges; Mf=multifocal; SIRPDs=stimulus induced rhythmic, periodic, or ictal appearing discharges; BIRDs=brief potentially ictal rhythmic discharges >4 Hz, lasting .5-10s; PFA (paroxysmal bursts >13 Hz or =8 Hz <10s).  Modifiers: +F=with fast component; +S=with spike component; +R=with rhythmic component.  S-B=burst suppression pattern.  Max=maximal. N1-drowsy; N2-stage II sleep; N3-slow wave sleep. SSS/BETS=small sharp spikes/benign epileptiform transients of sleep. HV=hyperventilation; PS=photic stimulation]]]    Daily EEG Visual Analysis    FINDINGS:      Background:  Continuity: Continuous  Symmetry: Symmetric  Posterior dominant rhythm (PDR): 8 Hz, reactive to eye closure. Symmetric low-amplitude frontal beta in wakefulness.  State Change: Present  Voltage: Normal  Anterior-Posterior Gradient: Present  Other background findings: None  Breach: Absent    Background Slowing:  Generalized slowing: As above  Focal slowing: Occasional left temporal focal polymorphic delta slowing    State Changes:   Drowsiness is characterized by fragmentation, attenuation, and slowing of the background activity.  Stage 2 sleep is not captured.    Interictal Findings:  None    Electrographic and Electroclinical seizures:  None    Other Clinical Events:  None    Activation Procedures:   Hyperventilation is not performed.    Photic stimulation is not performed.    Artifacts:  Intermittent myogenic and movement artifacts are present.    EKG:  Single-lead EKG shows regular rhythm at 40-60 bpm.    EEG Classification / Summary:  Abnormal routine EEG in the awake, drowsy states.   -Occasional left temporal focal slowing.  -Mild diffuse slowing.  -No epileptiform abnormalities are captured.     Clinical Impression:  -Left temporal focal cerebral dysfunction can be structural or functional in etiology.  -Mild diffuse cerebral dysfunction is nonspecific in etiology.   -Single-lead EKG shows bradycardia intermittently.          -------------------------------------------------------------------------------------------------------    Yumiko Duenas MD  Attending Physician, [facility] Epilepsy [place]    -------------------------------------------------------------------------------------------------------    To reach EEG technologist:  Please use the pager number for the appropriate hospital or contact the .  At [facility] - Pager #: [phone]    To reach EEG-reading physician:  [facility] EEG Reading Room Phone #: [phone]  Epilepsy Answering Service after 5PM and before 8:30AM: Phone #: [phone]

## 2024-02-16 NOTE — DISCHARGE NOTE NURSING/CASE MANAGEMENT/SOCIAL WORK - PATIENT PORTAL LINK FT
You can access the FollowMyHealth Patient Portal offered by [facility] by registering at the following website: [email or web address]. By joining [facility]’s FollowMyHealth portal, you will also be able to view your health information using other applications (apps) compatible with our system.

## 2024-02-22 DIAGNOSIS — E78.5 HYPERLIPIDEMIA, UNSPECIFIED: ICD-10-CM

## 2024-02-22 DIAGNOSIS — Z95.1 PRESENCE OF AORTOCORONARY BYPASS GRAFT: ICD-10-CM

## 2024-02-22 DIAGNOSIS — I25.10 ATHEROSCLEROTIC HEART DISEASE OF NATIVE CORONARY ARTERY WITHOUT ANGINA PECTORIS: ICD-10-CM

## 2024-02-22 DIAGNOSIS — I10 ESSENTIAL (PRIMARY) HYPERTENSION: ICD-10-CM

## 2024-02-22 DIAGNOSIS — G40.802 OTHER EPILEPSY, NOT INTRACTABLE, WITHOUT STATUS EPILEPTICUS: ICD-10-CM

## 2024-02-22 DIAGNOSIS — Z86.718 PERSONAL HISTORY OF OTHER VENOUS THROMBOSIS AND EMBOLISM: ICD-10-CM

## 2024-02-22 DIAGNOSIS — K21.9 GASTRO-ESOPHAGEAL REFLUX DISEASE WITHOUT ESOPHAGITIS: ICD-10-CM

## 2024-02-22 DIAGNOSIS — Z79.82 LONG TERM (CURRENT) USE OF ASPIRIN: ICD-10-CM

## 2024-02-22 DIAGNOSIS — R56.9 UNSPECIFIED CONVULSIONS: ICD-10-CM

## 2024-02-22 DIAGNOSIS — Z86.711 PERSONAL HISTORY OF PULMONARY EMBOLISM: ICD-10-CM

## 2024-02-22 DIAGNOSIS — Z79.01 LONG TERM (CURRENT) USE OF ANTICOAGULANTS: ICD-10-CM

## 2024-02-28 ENCOUNTER — RX RENEWAL (OUTPATIENT)
Age: 54
End: 2024-02-28

## 2024-03-08 ENCOUNTER — APPOINTMENT (OUTPATIENT)
Dept: FAMILY MEDICINE | Facility: CLINIC | Age: 54
End: 2024-03-08

## 2024-03-18 NOTE — DISCHARGE NOTE PROVIDER - NSDCADMDATE_GEN_ALL_CORE_FT
Caller: Esha    Doctor: Parmjit    Reason for call: patient returning nurses phone call     Call back#: [phone]   15-Feb-2024 06:58

## 2024-03-22 ENCOUNTER — NON-APPOINTMENT (OUTPATIENT)
Age: 54
End: 2024-03-22

## 2024-03-25 ENCOUNTER — RX RENEWAL (OUTPATIENT)
Age: 54
End: 2024-03-25

## 2024-03-25 RX ORDER — AMLODIPINE BESYLATE 10 MG/1
10 TABLET ORAL DAILY
Qty: 30 | Refills: 0 | Status: ACTIVE | COMMUNITY
Start: 1900-01-01 | End: 1900-01-01

## 2024-04-03 ENCOUNTER — APPOINTMENT (OUTPATIENT)
Dept: CT IMAGING | Facility: CLINIC | Age: 54
End: 2024-04-03
Payer: COMMERCIAL

## 2024-04-03 ENCOUNTER — OUTPATIENT (OUTPATIENT)
Dept: OUTPATIENT SERVICES | Facility: HOSPITAL | Age: 54
LOS: 1 days | End: 2024-04-03

## 2024-04-03 DIAGNOSIS — Z95.1 PRESENCE OF AORTOCORONARY BYPASS GRAFT: Chronic | ICD-10-CM

## 2024-04-03 DIAGNOSIS — Z41.9 ENCOUNTER FOR PROCEDURE FOR PURPOSES OTHER THAN REMEDYING HEALTH STATE, UNSPECIFIED: Chronic | ICD-10-CM

## 2024-04-03 DIAGNOSIS — Z95.828 PRESENCE OF OTHER VASCULAR IMPLANTS AND GRAFTS: Chronic | ICD-10-CM

## 2024-04-03 PROCEDURE — 74174 CTA ABD&PLVS W/CONTRAST: CPT | Mod: 26

## 2024-04-03 PROCEDURE — 71275 CT ANGIOGRAPHY CHEST: CPT | Mod: 26

## 2024-04-08 ENCOUNTER — RX RENEWAL (OUTPATIENT)
Age: 54
End: 2024-04-08

## 2024-04-24 PROBLEM — Z98.890 S/P THORACIC AORTIC ANEURYSM REPAIR: Status: ACTIVE | Noted: 2023-04-13

## 2024-04-24 NOTE — CONSULT LETTER
[Dear  ___] : Dear  [unfilled], [Please see my note below.] : Please see my note below. [Sincerely,] : Sincerely, [FreeTextEntry2] : Jacqueline Gonsales MD [FreeTextEntry1] : Please find attached our consultation on your patient, Mr. VINCENT MARIE .   We take a multidisciplinary team approach to patient care and consider you, the referring physician, an extension of our team. We will maintain an open line of communication with you throughout your patient's treatment course.    It is our commitment to provide your patient with the highest quality of advanced therapeutic options. We thank you for allowing us to participate in the care of your patient.   Please do not hesitate to contact our team with any questions or concerns at [phone]. [FreeTextEntry3] : Elvis Pierre M.D. Professor of Cardiovascular and Thoracic Surgery Minimally Invasive Valve Surgeon Director of Aortic Surgery, [facility] Cell: [phone] Email: [email or web address] [address], [address] Floor, [place] [address] Office: [phone] Fax: [phone]   [facility]: Department of Cardiovascular and Thoracic Surgery [address] [place] [address], [facility] Office: [phone] Fax: [phone]   Practice Manager: Ms. Sloane Fischer Email: [email or web address] Phone: [phone]

## 2024-05-01 ENCOUNTER — APPOINTMENT (OUTPATIENT)
Dept: CARDIOTHORACIC SURGERY | Facility: CLINIC | Age: 54
End: 2024-05-01
Payer: COMMERCIAL

## 2024-05-01 VITALS
BODY MASS INDEX: 22.48 KG/M2 | TEMPERATURE: 97.2 F | HEART RATE: 60 BPM | HEIGHT: 72 IN | SYSTOLIC BLOOD PRESSURE: 118 MMHG | DIASTOLIC BLOOD PRESSURE: 56 MMHG | WEIGHT: 166 LBS | OXYGEN SATURATION: 97 %

## 2024-05-01 DIAGNOSIS — Z86.79 OTHER SPECIFIED POSTPROCEDURAL STATES: ICD-10-CM

## 2024-05-01 DIAGNOSIS — Z98.890 OTHER SPECIFIED POSTPROCEDURAL STATES: ICD-10-CM

## 2024-05-01 PROCEDURE — 99214 OFFICE O/P EST MOD 30 MIN: CPT

## 2024-05-03 NOTE — PROGRESS NOTE ADULT - SUBJECTIVE AND OBJECTIVE BOX
ON: adv OGT - confirmed on CXR, nose bleeding, LIJ removed  6/1: ordered 1U Platelets, RTOR, not much bleeding, old blood evaculated, placed feeding J-tube (to gravity for now), Pancreatic CODIE bilious, intraop EGD no leak. got 1U PRBC and 1 plt, off vasopressin, stopped TXA. CVVHD switched to net NEGATIVE post-op. RIJ TLC placed,      SUBJECTIVE: Patient seen and examined bedside; off levo, minimal vaso, more awake this morning, off sedation, on fen/precedex for pain control.    meropenem  IVPB 1000 milliGRAM(s) IV Intermittent every 8 hours      Vital Signs Last 24 Hrs  T(C): 37 (02 Jun 2023 12:00), Max: 37 (02 Jun 2023 04:57)  T(F): 98.6 (02 Jun 2023 12:00), Max: 98.6 (02 Jun 2023 04:57)  HR: 122 (02 Jun 2023 12:00) (106 - 124)  BP: --  BP(mean): --  RR: 13 (02 Jun 2023 12:00) (10 - 23)  SpO2: 100% (02 Jun 2023 12:00) (97% - 100%)    Parameters below as of 02 Jun 2023 12:00  Patient On (Oxygen Delivery Method): BiPAP/CPAP      I&O's Detail    01 Jun 2023 07:01  -  02 Jun 2023 07:00  --------------------------------------------------------  IN:    Cisatracurium: 75.6 mL    FentaNYL: 73.5 mL    IV PiggyBack: 100 mL    IV PiggyBack: 75 mL    Platelets - Single Donor: 215 mL    Propofol: 361.2 mL    TPN (Total Parenteral Nutrition): 1260 mL    Vasopressin: 39 mL    Vasopressin: 39 mL  Total IN: 2238.3 mL    OUT:    Bulb (mL): 120 mL    Bulb (mL): 25 mL    Bulb (mL): 595 mL    Drain (mL): 125 mL    Drain (mL): 215 mL    Drain (mL): 0 mL    Nasogastric/Oral tube (mL): 100 mL    Other (mL): 2818 mL    VAC (Vacuum Assisted Closure) System (mL): 200 mL    Voided (mL): 300 mL  Total OUT: 4498 mL    Total NET: -2259.7 mL      02 Jun 2023 07:01  -  02 Jun 2023 12:44  --------------------------------------------------------  IN:    Albumin 5%  - 250 mL: 50 mL    Dexmedetomidine: 10.5 mL    FentaNYL: 7 mL    FentaNYL: 15.8 mL    TPN (Total Parenteral Nutrition): 350 mL  Total IN: 433.3 mL    OUT:    Bulb (mL): 120 mL    Drain (mL): 50 mL    Drain (mL): 15 mL    Other (mL): 966 mL    Propofol: 0 mL    Vasopressin: 0 mL    Voided (mL): 25 mL  Total OUT: 1176 mL    Total NET: -742.7 mL          General: NAD, resting comfortably in bed  Neuro: RASS -3, sedated with prop/fent  HEENT: ETT in place, OJ tube in place  C/V: tachycardic, S1S2, no MRG  Pulm: intubated, full AC, lungs CTAB  Abd: soft, mildly distended, midline incision CDI with island dressing in place, CODIE drains serosanginous, Jtube in place, no rebound, no guarding  : external coulter in place  Groin: Right groin hematoma soft, unchanged from previous exam  Extrem: WWP, +3 pitting edema b/l        LABS:                        9.2    14.41 )-----------( 69       ( 02 Jun 2023 05:06 )             26.4     06-02    134<L>  |  102  |  33<H>  ----------------------------<  115<H>  4.4   |  22  |  0.92    Ca    8.0<L>      02 Jun 2023 05:06  Phos  4.6     06-02  Mg     2.2     06-02    TPro  5.0<L>  /  Alb  2.3<L>  /  TBili  1.5<H>  /  DBili  1.0<H>  /  AST  23  /  ALT  14  /  AlkPhos  114  06-02    PT/INR - ( 02 Jun 2023 05:06 )   PT: 14.0 sec;   INR: 1.17          PTT - ( 02 Jun 2023 05:06 )  PTT:31.4 sec      RADIOLOGY & ADDITIONAL STUDIES:   Negative Screen

## 2024-05-06 ENCOUNTER — RX RENEWAL (OUTPATIENT)
Age: 54
End: 2024-05-06

## 2024-05-09 RX ORDER — BUMETANIDE 2 MG/1
2 TABLET ORAL DAILY
Qty: 14 | Refills: 0 | Status: ACTIVE | COMMUNITY
Start: 1900-01-01 | End: 1900-01-01

## 2024-05-11 RX ORDER — ATORVASTATIN CALCIUM 20 MG/1
20 TABLET, FILM COATED ORAL
Qty: 90 | Refills: 1 | Status: ACTIVE | COMMUNITY
Start: 1900-01-01 | End: 1900-01-01

## 2024-05-12 NOTE — ASSESSMENT
[FreeTextEntry1] : 54 year old male, current every day marijuana use with a past medical hx of HTN, type A aortic dissection s/p Dacron grafts, AV resuspension in 2013, CAD s/p CABG x 1 SVG to RCA (5/2013 with Dr. Medina), PE (s/p IVC filter, subsequent removal by Dr. Ellison 9/13/23, not on AC), seizure disorder (last episode on 2/14/24, admission @ [facility] hemorrhagic pancreatitis & peripancreatic abscess (followed by Dr. Solano) who presents for evaluation and management of his aortic pathology.  I have reviewed the patient's medical records, diagnostic images during the time of this office consultation and have made the following recommendation. CT was completed of the thoracic aorta. The report was reviewed and noted in the chart, I independently reviewed and interpreted images and report and I reviewed the films/CD and agree.  Review of the imaging shows his aortic pathology has remained stable and does not require surgical intervention.  - [facility] up in [facility] yr with CTA chest/abdomen/pelvis, 3 phase study.  - Continue medication regimen. - Recommend follow up with cardiologist Dr. Abraham and PCP regarding LE edema.  - Blood pressure management. - Discussed signs and symptoms that warrant emergency medical attention.

## 2024-05-12 NOTE — PHYSICAL EXAM
[Sclera] : the sclera and conjunctiva were normal [Extraocular Movements] : extraocular movements were intact [Neck Appearance] : the appearance of the neck was normal [] : no respiratory distress [Auscultation Breath Sounds / Voice Sounds] : lungs were clear to auscultation bilaterally [Heart Rate And Rhythm] : heart rate was normal and rhythm regular [Heart Sounds] : normal S1 and S2 [Heart Sounds Gallop] : no gallops [Murmurs] : no murmurs [Heart Sounds Pericardial Friction Rub] : no pericardial rub [2+] : left 2+ [Bowel Sounds] : normal bowel sounds [Abdomen Soft] : soft [Abdomen Tenderness] : non-tender [Abnormal Walk] : normal gait [Skin Color & Pigmentation] : normal skin color and pigmentation [Cranial Nerves] : cranial nerves 2-12 were intact [Oriented To Time, Place, And Person] : oriented to person, place, and time [Right Carotid Bruit] : no bruit heard over the right carotid [Left Carotid Bruit] : no bruit heard over the left carotid [FreeTextEntry2] : 2-3 + pitting edema LLE, no edema RLE  [FreeTextEntry1] : Deferred

## 2024-05-12 NOTE — DATA REVIEWED
[FreeTextEntry1] : MRI brain 3/22/2022: No acute intracranial abnormality. Focal susceptibility artifact seen in the left superior frontal gyrus and left postcentral gyrus may represent focal dystrophic calcification, hemosiderin deposition from prior microhemorrhage or cavernomas.  TTE 8/19/2022 EF normal. trileaflet aortic valve. no aortic stenosis. mild to moderate aortic regurgitation. descending aorta measures 5.07cm.  CTA chest/abdomen/pelvis 2/1/23:  Status post graft repair of the ascending aorta and portion of aortic arch. Dissecting aneurysm of the descending thoracic aorta (measuring up to 5.7 cm) and abdominal aorta (3.5 cm infrarenal), similar to the MR angiogram of 9/19/2022. Nonocclusive dissection flap present within the innominate artery, aneurysmal right subclavian artery and proximal right common carotid artery as well as aneurysmal left common iliac artery.

## 2024-05-12 NOTE — HISTORY OF PRESENT ILLNESS
[FreeTextEntry1] : 54 year old male, current every day marijuana use with a past medical hx of HTN, type A aortic dissection s/p Dacron grafts, AV resuspension in 2013, CAD s/p CABG x 1 SVG to RCA (5/2013 with Dr. Medina), PE (s/p IVC filter, subsequent removal by Dr. Ellison 9/13/23, not on AC), seizure disorder (last episode on 2/14/24,admission @ [facility]), hemorrhagic pancreatitis & peripancreatic abscess (followed by Dr. Solano) who presents for evaluation and management of his aortic pathology. Pt is under the care of Dr. Abraham.   CTA chest/abdomen/pelvis 4/3/24:  Status post graft repair of the ascending aorta and portion of aortic arch. Endoluminal stent graft within the descending thoracic aorta and upper abdominal aorta. Dissection flap again noted in the right common carotid artery and right subclavian artery which remain opacified. Origin of left subclavian artery is occluded with retrograde opacification from bypass graft. Patent visualized portion of left common carotid artery. Aneurysmal descending thoracic aorta measuring 5 cm proximally on axial images (previously measured 5.9 cm on 7/17/2023) and 5 cm in the midportion (previously measured 5.5 cm at the same level on 7/17/2023). No evidence of endoleak. No pulmonary emboli. *3.4 x 2 cm cystic lesion in the pancreatic tail, probably a pseudocyst. * My measurement: 4.5cm proximal to the celiac artery  Patient is doing well. Reports his last hospital admission was in 2/2024 for a seizure. Pt reports worsening LLE edema for the past week. He denies fever, chills, fatigue, headache, blurred vision, dizziness, syncope, chest pain, palpitations, shortness of breath, orthopnea, paroxysmal nocturnal dyspnea, nausea, vomiting, abdominal pain, back pain, BRBPR.

## 2024-05-12 NOTE — END OF VISIT
[Time Spent: ___ minutes] : I have spent [unfilled] minutes of time on the encounter. [FreeTextEntry3] : I, YOSHI Wright personally performed the evaluation and management (E/M) services for this established patient who presents today with (a) new problem(s)/exacerbation of (an) existing condition(s).  That E/M includes conducting the clinically appropriate interval history &/or exam, assessing all new/exacerbated conditions, and establishing a new plan of care.  Today, my DEANNA, was here to observe &/or participate in the visit & follow plan of care established by me.

## 2024-05-30 ENCOUNTER — RX RENEWAL (OUTPATIENT)
Age: 54
End: 2024-05-30

## 2024-06-04 ENCOUNTER — RX RENEWAL (OUTPATIENT)
Age: 54
End: 2024-06-04

## 2024-06-04 NOTE — PROGRESS NOTE ADULT - SUBJECTIVE AND OBJECTIVE BOX
CTICU  CRITICAL  CARE  attending     Hand off received 					   Pertinent clinical, laboratory, radiographic, hemodynamic, echocardiographic, respiratory data, microbiologic data and chart were reviewed and analyzed frequently throughout the course of the day and night      52 year old Male with HTN, type A aortic dissection s/p Dacron grafts, AV resuspension in 2013, CAD s/p CABG x 1 SVG to RCA (5/2013 with Dr. Medina), seizure disorder (last episode on 7/4/22)   He is a current every day marijuana user.   He was admitted for surgical management of progression of aneurysmal disease.   On 3/20/23 the patient underwent replacement of transverse aortic arch, second stage thoracic endovascular aortic repair, aorto-axillary bypass, AV replacement (bio 23mm),CABG x 1 (SVG-RCA).   Postoperative course complicated by lactic acidosis, severe cardiogenic and vasogenic shock, TREY requiring CVVHD and temporary dialysis requirement. Eventually discharged 4/14/23.   The patient presented to [facility] Emergency Room 4/27 for syncope vs seizure episode at home, falling backwards on the head.   Neurological workup performed during that visit revealed a negative EEG, but given clinical picture of seizures, he was started on vimpat and depakote.   Imaging preformed during that admission did no necessitate acute surgical intervention on endoleak.   He was readmitted to [facility] complaining of worsening epigastric pain.   CTAP revealed continued endoleak.   He was transferred to [facility] for further evaluation.    S/P Second stage TEVAR.       FAMILY HISTORY:  No pertinent family history in first degree relatives    PAST MEDICAL & SURGICAL HISTORY:  HTN (hypertension)  Aortic dissection  CAD (coronary artery disease)  Seizure disorder  S/P aortic bifurcation bypass graft  H/O CABG x 1        14 system review was unremarkable    Vital signs, hemodynamic and respiratory parameters were reviewed from the bedside nursing flow sheet.  ICU Vital Signs Last 24 Hrs  T(C): 36.5 (19 May 2023 23:00), Max: 39.4 (19 May 2023 15:00)  T(F): 97.7 (19 May 2023 23:00), Max: 103 (19 May 2023 15:00)  HR: 139 (20 May 2023 01:00) (83 - 142)  BP: --  BP(mean): --  ABP: 148/87 (20 May 2023 01:00) (91/56 - 150/87)  ABP(mean): 111 (20 May 2023 01:00) (70 - 112)  RR: 22 (20 May 2023 01:00) (20 - 23)  SpO2: 100% (20 May 2023 01:00) (95% - 100%)    O2 Parameters below as of 20 May 2023 01:00  Patient On (Oxygen Delivery Method): ventilator    O2 Concentration (%): 60      Adult Advanced Hemodynamics Last 24 Hrs  CVP(mm Hg): 4 (20 May 2023 01:00) (3 - 13)  CVP(cm H2O): --  CO: --  CI: --  PA: --  PA(mean): --  PCWP: --  SVR: --  SVRI: --  PVR: --  PVRI: --, ABG - ( 19 May 2023 15:19 )  pH, Arterial: 7.44  pH, Blood: x     /  pCO2: 34    /  pO2: 111   / HCO3: 23    / Base Excess: -0.5  /  SaO2: 99.0              Mode: AC/ CMV (Assist Control/ Continuous Mandatory Ventilation)  RR (machine): 20  TV (machine): 500  FiO2: 60  PEEP: 8  ITime: 1.2  MAP: 12  PIP: 24    Intake and output was reviewed and the fluid balance was calculated  Daily     Daily   I&O's Summary    18 May 2023 07:01  -  19 May 2023 07:00  --------------------------------------------------------  IN: 4065.9 mL / OUT: 4920 mL / NET: -854.1 mL    19 May 2023 07:01  -  20 May 2023 01:17  --------------------------------------------------------  IN: 2679 mL / OUT: 3040 mL / NET: -361 mL          Neuro: Moves all 4 extremities when sedation is off.  Neck: No JVD.  CVS: S1, S2, No S3.  Lungs: Good air entry bilaterally.  Abd: Soft. Distended. Mild tenderness. Hypoactive Bowel sounds.  Vascular: + DP/PT.  Extremities: No edema.  Lymphatic: Normal.  Skin: No abnormalities.      labs  CBC Full  -  ( 19 May 2023 15:20 )  WBC Count : 13.18 K/uL  RBC Count : 3.44 M/uL  Hemoglobin : 10.4 g/dL  Hematocrit : 31.1 %  Platelet Count - Automated : 188 K/uL  Mean Cell Volume : 90.4 fl  Mean Cell Hemoglobin : 30.2 pg  Mean Cell Hemoglobin Concentration : 33.4 gm/dL  Auto Neutrophil # : x  Auto Lymphocyte # : x  Auto Monocyte # : x  Auto Eosinophil # : x  Auto Basophil # : x  Auto Neutrophil % : x  Auto Lymphocyte % : x  Auto Monocyte % : x  Auto Eosinophil % : x  Auto Basophil % : x    05-19    142  |  107  |  61<H>  ----------------------------<  127<H>  3.9   |  24  |  1.24    Ca    8.7      19 May 2023 15:20  Phos  2.9     05-19  Mg     2.1     05-19    TPro  5.8<L>  /  Alb  2.8<L>  /  TBili  3.5<H>  /  DBili  x   /  AST  21  /  ALT  5<L>  /  AlkPhos  73  05-19    PT/INR - ( 19 May 2023 15:20 )   PT: 15.6 sec;   INR: 1.31          PTT - ( 19 May 2023 15:20 )  PTT:30.7 sec  The current medications were reviewed   MEDICATIONS  (STANDING):  aspirin  chewable 81 milliGRAM(s) Oral daily  chlorhexidine 0.12% Liquid 15 milliLiter(s) Oral Mucosa every 12 hours  chlorhexidine 2% Cloths 1 Application(s) Topical daily  fentaNYL    Injectable 50 MICROGram(s) IV Push once  lacosamide IVPB 250 milliGRAM(s) IV Intermittent every 12 hours  milrinone Infusion 0.125 MICROgram(s)/kG/Min (2.63 mL/Hr) IV Continuous <Continuous>  pantoprazole  Injectable 40 milliGRAM(s) IV Push daily  Parenteral Nutrition - Adult 1 Each (70 mL/Hr) TPN Continuous <Continuous>  piperacillin/tazobactam IVPB.. 3.375 Gram(s) IV Intermittent every 8 hours  propofol Infusion 10 MICROgram(s)/kG/Min (4.2 mL/Hr) IV Continuous <Continuous>  sodium chloride 0.9%. 1000 milliLiter(s) (10 mL/Hr) IV Continuous <Continuous>  vancomycin  IVPB 1000 milliGRAM(s) IV Intermittent every 12 hours    MEDICATIONS  (PRN):  fentaNYL    Injectable 25 MICROGram(s) IV Push every 3 hours PRN Severe Pain (7 - 10)        53 year old  Male admitted with abdominal pain due to endoleak.  S/P Second Stage TEVAR.  Postoperative course complicated by sepsis, pneumonia, Hemorrhagic Pancreatitis, Renal Failure, Ileus, Epilepsy, Ascites, Respiratory Failure, Acute posthemorrhagic anemia.  Hemodynamically stable.  Good oxygenation.  Fair urine out put.  EEG: Moderate generalized slowing suggestive of diffuse or multifocal cerebral dysfunction         My plan includes :  TPN  Continue full vent support.   IV milrinone.  IV antibiotic Rx for Klebsiella and proteus.  Statin Rx.  Hold valproic acid. Continue Vimpat.  Close hemodynamic, ventilatory and drain monitoring and management  Monitor for arrhythmias and monitor parameters for organ perfusion  Monitor neurologic status  Monitor renal function.  Head of the bed should remain elevated to 45 deg .   Chest PT and IS will be encouraged  Monitor adequacy of oxygenation and ventilation and attempt to wean oxygen  Nutritional goals will be met using po eventually , ensure adequate caloric intake and monitor the same  Stress ulcer and VTE prophylaxis will be achieved    Glycemic control is satisfactory  Electrolytes have been repleted as necessary and wound care has been carried out. Pain control has been achieved.   Aggressive physical therapy and early mobility and ambulation goals will be met   The family was updated about the course and plan  CRITICAL CARE TIME SPENT in evaluation and management, reassessments, review and interpretation of labs and x-rays, ventilator and hemodynamic management, formulating a plan and coordinating care: ___30____ MIN.  Time does not include procedural time.  CTICU ATTENDING     					    José Miguel Torres MD                        	 scheduled pt nutrition apt w/ Nelda SOL July 9th 9am

## 2024-06-10 ENCOUNTER — APPOINTMENT (OUTPATIENT)
Dept: HEART AND VASCULAR | Facility: CLINIC | Age: 54
End: 2024-06-10
Payer: COMMERCIAL

## 2024-06-10 VITALS
BODY MASS INDEX: 23.72 KG/M2 | WEIGHT: 175.13 LBS | OXYGEN SATURATION: 95 % | SYSTOLIC BLOOD PRESSURE: 154 MMHG | TEMPERATURE: 98.1 F | HEIGHT: 72 IN | HEART RATE: 54 BPM | DIASTOLIC BLOOD PRESSURE: 76 MMHG

## 2024-06-10 DIAGNOSIS — R53.1 WEAKNESS: ICD-10-CM

## 2024-06-10 DIAGNOSIS — Z95.2 PRESENCE OF PROSTHETIC HEART VALVE: ICD-10-CM

## 2024-06-10 DIAGNOSIS — I10 ESSENTIAL (PRIMARY) HYPERTENSION: ICD-10-CM

## 2024-06-10 DIAGNOSIS — Z95.1 PRESENCE OF AORTOCORONARY BYPASS GRAFT: ICD-10-CM

## 2024-06-10 PROCEDURE — 99214 OFFICE O/P EST MOD 30 MIN: CPT

## 2024-06-10 PROCEDURE — G2211 COMPLEX E/M VISIT ADD ON: CPT

## 2024-06-10 PROCEDURE — 93306 TTE W/DOPPLER COMPLETE: CPT

## 2024-06-10 RX ORDER — METOPROLOL SUCCINATE 100 MG/1
100 TABLET, EXTENDED RELEASE ORAL DAILY
Qty: 1 | Refills: 1 | Status: COMPLETED | COMMUNITY
End: 2024-06-10

## 2024-06-10 RX ORDER — METOPROLOL SUCCINATE 25 MG/1
25 TABLET, EXTENDED RELEASE ORAL DAILY
Qty: 30 | Refills: 0 | Status: COMPLETED | COMMUNITY
End: 2024-06-10

## 2024-06-10 RX ORDER — APIXABAN 5 MG/1
5 TABLET, FILM COATED ORAL
Refills: 0 | Status: COMPLETED | COMMUNITY
End: 2024-06-10

## 2024-06-10 RX ORDER — HYDROCHLOROTHIAZIDE 12.5 MG/1
12.5 TABLET ORAL DAILY
Qty: 30 | Refills: 3 | Status: ACTIVE | COMMUNITY
Start: 2024-06-10 | End: 1900-01-01

## 2024-06-10 RX ORDER — BISOPROLOL FUMARATE 10 MG/1
10 TABLET, FILM COATED ORAL
Refills: 0 | Status: ACTIVE | COMMUNITY
Start: 2024-06-10

## 2024-06-11 PROBLEM — I10 HYPERTENSION: Status: ACTIVE | Noted: 2022-11-21

## 2024-06-11 PROBLEM — R53.1 STRENGTH LOSS OF: Status: ACTIVE | Noted: 2023-05-01

## 2024-06-11 PROBLEM — Z95.2 S/P AVR: Status: ACTIVE | Noted: 2023-04-11

## 2024-06-11 PROBLEM — Z95.1 S/P CABG X 1: Status: ACTIVE | Noted: 2023-04-11

## 2024-06-11 NOTE — REASON FOR VISIT
[Symptom and Test Evaluation] : symptom and test evaluation [FreeTextEntry1] : 53 year old man comes in for a visit and re-evaluation. He was last seen in Sept. His hospital course was remarkable for type A aortic dissection s/p dacron graft and CAD s/p CABG. He occasionally gets winded but no other complains noted. No syncope. Occasional edema still noted. We are discussing edema and blood pressure management.

## 2024-06-11 NOTE — DISCUSSION/SUMMARY
[FreeTextEntry1] : Edema venous us if able to approve and schedule. HTN cont current meds, clarified list, prn bumex and add low dose hctz HLD cont statin and labs with PMD CAD cont ASA CTS notes reviewed.

## 2024-06-24 ENCOUNTER — RX RENEWAL (OUTPATIENT)
Age: 54
End: 2024-06-24

## 2024-06-24 RX ORDER — PANTOPRAZOLE 40 MG/1
40 TABLET, DELAYED RELEASE ORAL
Qty: 30 | Refills: 0 | Status: ACTIVE | COMMUNITY
Start: 1900-01-01 | End: 1900-01-01

## 2024-07-18 ENCOUNTER — APPOINTMENT (OUTPATIENT)
Dept: FAMILY MEDICINE | Facility: CLINIC | Age: 54
End: 2024-07-18

## 2024-07-22 ENCOUNTER — NON-APPOINTMENT (OUTPATIENT)
Age: 54
End: 2024-07-22

## 2024-07-22 ENCOUNTER — APPOINTMENT (OUTPATIENT)
Dept: HEART AND VASCULAR | Facility: CLINIC | Age: 54
End: 2024-07-22
Payer: COMMERCIAL

## 2024-07-22 VITALS
WEIGHT: 175 LBS | DIASTOLIC BLOOD PRESSURE: 70 MMHG | HEART RATE: 54 BPM | OXYGEN SATURATION: 96 % | HEIGHT: 72 IN | SYSTOLIC BLOOD PRESSURE: 113 MMHG | BODY MASS INDEX: 23.7 KG/M2

## 2024-07-22 DIAGNOSIS — R53.81 OTHER MALAISE: ICD-10-CM

## 2024-07-22 PROCEDURE — 99214 OFFICE O/P EST MOD 30 MIN: CPT

## 2024-07-22 PROCEDURE — 93000 ELECTROCARDIOGRAM COMPLETE: CPT

## 2024-07-22 PROCEDURE — G2211 COMPLEX E/M VISIT ADD ON: CPT

## 2024-07-23 NOTE — DISCUSSION/SUMMARY
[FreeTextEntry1] : Leg fatigue check art us if able to approve and schedule. Edema seems reasonable on exam check venous us if able to approve and schedule. HTN clarify meds and follow up at [facility]. I'm a bit uncertain on the logic here.  AD follow up with Dr Pierre in a year or so EKG SR no ST T changes  [EKG obtained to assist in diagnosis and management of assessed problem(s)] : EKG obtained to assist in diagnosis and management of assessed problem(s)

## 2024-07-23 NOTE — DISCUSSION/SUMMARY
[FreeTextEntry1] : Leg fatigue check art us if able to approve and schedule. Edema seems reasonable on exam check venous us if able to approve and schedule. HTN clarify meds and follow up at [facility]. I'm a bit uncertain on the logic here.  AD follow up with Dr Pierre in a year or so EKG SR no ST T changes  [EKG obtained to assist in diagnosis and management of assessed problem(s)] : EKG obtained to assist in diagnosis and management of assessed problem(s)

## 2024-07-23 NOTE — REASON FOR VISIT
[Symptom and Test Evaluation] : symptom and test evaluation [FreeTextEntry1] : Patient comes in for a follow up and re-evaluation. He completed an echocardiogram. Since last visit, he also saw Praveen Moreira M.D. at [facility]. It seems that Dr Moreira started bisoprolol 10 mg. In addition, testosterone started. He still remarks on deconditioning, fatigue and ED issues. Pressure seems a little low and they seem to be a bit uncertain on the other medications.

## 2024-07-23 NOTE — REASON FOR VISIT
[Symptom and Test Evaluation] : symptom and test evaluation [FreeTextEntry1] : Patient comes in for a follow up and re-evaluation. He completed an echocardiogram. Since last visit, he also saw Praveen Moreira M.D. at [facility]. It seems that Dr Moreira started bisoprolol 10 mg. In addition, testosterone started. He still remarks on deconditioning, fatigue and ED issues. Pressure seems a little low and they seem to be a bit uncertain on the other medications.

## 2024-07-26 ENCOUNTER — APPOINTMENT (OUTPATIENT)
Dept: FAMILY MEDICINE | Facility: CLINIC | Age: 54
End: 2024-07-26
Payer: COMMERCIAL

## 2024-07-26 VITALS
DIASTOLIC BLOOD PRESSURE: 60 MMHG | OXYGEN SATURATION: 96 % | BODY MASS INDEX: 23.57 KG/M2 | HEIGHT: 72 IN | SYSTOLIC BLOOD PRESSURE: 107 MMHG | TEMPERATURE: 97.9 F | HEART RATE: 59 BPM | WEIGHT: 174 LBS

## 2024-07-26 DIAGNOSIS — R79.89 OTHER SPECIFIED ABNORMAL FINDINGS OF BLOOD CHEMISTRY: ICD-10-CM

## 2024-07-26 DIAGNOSIS — Z87.448 PERSONAL HISTORY OF OTHER DISEASES OF URINARY SYSTEM: ICD-10-CM

## 2024-07-26 DIAGNOSIS — I10 ESSENTIAL (PRIMARY) HYPERTENSION: ICD-10-CM

## 2024-07-26 DIAGNOSIS — G40.909 EPILEPSY, UNSPECIFIED, NOT INTRACTABLE, W/OUT STATUS EPILEPTICUS: ICD-10-CM

## 2024-07-26 PROCEDURE — 99214 OFFICE O/P EST MOD 30 MIN: CPT

## 2024-07-26 PROCEDURE — G2211 COMPLEX E/M VISIT ADD ON: CPT

## 2024-07-26 RX ORDER — TESTOSTERONE 20.25 MG/1.25G
20.25 MG/1.25GM GEL TOPICAL
Qty: 1 | Refills: 0 | Status: ACTIVE | COMMUNITY
Start: 2024-07-26

## 2024-07-26 RX ORDER — ESLICARBAZEPINE ACETATE 800 MG/1
800 TABLET ORAL
Qty: 1 | Refills: 0 | Status: ACTIVE | COMMUNITY
Start: 2024-07-26

## 2024-07-26 NOTE — HEALTH RISK ASSESSMENT
[0] : 2) Feeling down, depressed, or hopeless: Not at all (0) [PHQ-2 Negative - No further assessment needed] : PHQ-2 Negative - No further assessment needed [Never] : Never [[id number]] : 0

## 2024-07-26 NOTE — HISTORY OF PRESENT ILLNESS
[FreeTextEntry1] : med discussion  labs  [de-identified] : 53 yo m presents for labs and discussion on meds

## 2024-07-26 NOTE — PLAN
[FreeTextEntry1] : follow up labs, will return for labs   will connect with cardio and htn specialist-- Praveen Moreira, patient was told to take norvasc bid, he has had lower pressures and leg swelling, will connect to see if we can alter doses and confirm doses

## 2024-07-30 ENCOUNTER — APPOINTMENT (OUTPATIENT)
Dept: FAMILY MEDICINE | Facility: CLINIC | Age: 54
End: 2024-07-30
Payer: MEDICARE

## 2024-07-30 ENCOUNTER — LABORATORY RESULT (OUTPATIENT)
Age: 54
End: 2024-07-30

## 2024-07-30 PROCEDURE — 36415 COLL VENOUS BLD VENIPUNCTURE: CPT

## 2024-07-31 LAB
ALBUMIN SERPL ELPH-MCNC: 3.5 G/DL
ALP BLD-CCNC: 113 U/L
ALT SERPL-CCNC: 23 U/L
ANION GAP SERPL CALC-SCNC: 12 MMOL/L
APPEARANCE: CLEAR
AST SERPL-CCNC: 52 U/L
BACTERIA: NEGATIVE /HPF
BASOPHILS # BLD AUTO: 0.07 K/UL
BASOPHILS NFR BLD AUTO: 1.1 %
BILIRUB SERPL-MCNC: 0.5 MG/DL
BILIRUBIN URINE: NEGATIVE
BLOOD URINE: ABNORMAL
BUN SERPL-MCNC: 24 MG/DL
CALCIUM SERPL-MCNC: 8.9 MG/DL
CAST: 0 /LPF
CHLORIDE SERPL-SCNC: 105 MMOL/L
CHOLEST SERPL-MCNC: 114 MG/DL
CO2 SERPL-SCNC: 23 MMOL/L
COLOR: YELLOW
CREAT SERPL-MCNC: 1.12 MG/DL
EGFR: 78 ML/MIN/1.73M2
EOSINOPHIL # BLD AUTO: 0.22 K/UL
EOSINOPHIL NFR BLD AUTO: 3.5 %
EPITHELIAL CELLS: 1 /HPF
ESTIMATED AVERAGE GLUCOSE: 74 MG/DL
GLUCOSE QUALITATIVE U: NEGATIVE MG/DL
GLUCOSE SERPL-MCNC: 84 MG/DL
HBA1C MFR BLD HPLC: 4.2 %
HCT VFR BLD CALC: 27.8 %
HDLC SERPL-MCNC: 54 MG/DL
HGB BLD-MCNC: 7.9 G/DL
IMM GRANULOCYTES NFR BLD AUTO: 0.3 %
KETONES URINE: NEGATIVE MG/DL
LDLC SERPL CALC-MCNC: 47 MG/DL
LEUKOCYTE ESTERASE URINE: NEGATIVE
LYMPHOCYTES # BLD AUTO: 1.3 K/UL
LYMPHOCYTES NFR BLD AUTO: 21 %
MAN DIFF?: NORMAL
MCHC RBC-ENTMCNC: 28.4 GM/DL
MCHC RBC-ENTMCNC: 29.6 PG
MCV RBC AUTO: 104.1 FL
MICROSCOPIC-UA: NORMAL
MONOCYTES # BLD AUTO: 0.65 K/UL
MONOCYTES NFR BLD AUTO: 10.5 %
NEUTROPHILS # BLD AUTO: 3.94 K/UL
NEUTROPHILS NFR BLD AUTO: 63.6 %
NITRITE URINE: NEGATIVE
NONHDLC SERPL-MCNC: 60 MG/DL
PH URINE: 6.5
PLATELET # BLD AUTO: 213 K/UL
POTASSIUM SERPL-SCNC: 4.4 MMOL/L
PROT SERPL-MCNC: 6.1 G/DL
PROTEIN URINE: NORMAL MG/DL
PSA SERPL-MCNC: 0.09 NG/ML
RBC # BLD: 2.67 M/UL
RBC # FLD: 25.1 %
RED BLOOD CELLS URINE: 5 /HPF
SODIUM SERPL-SCNC: 140 MMOL/L
SPECIFIC GRAVITY URINE: 1.02
TRIGL SERPL-MCNC: 63 MG/DL
TSH SERPL-ACNC: 0.59 UIU/ML
UROBILINOGEN URINE: 1 MG/DL
WBC # FLD AUTO: 6.2 K/UL
WHITE BLOOD CELLS URINE: 0 /HPF

## 2024-08-01 LAB
TESTOST FREE SERPL-MCNC: 3.2 PG/ML
TESTOST SERPL-MCNC: 380 NG/DL

## 2024-08-07 ENCOUNTER — RX RENEWAL (OUTPATIENT)
Age: 54
End: 2024-08-07

## 2024-08-20 ENCOUNTER — RX RENEWAL (OUTPATIENT)
Age: 54
End: 2024-08-20

## 2024-08-20 ENCOUNTER — EMERGENCY (EMERGENCY)
Facility: HOSPITAL | Age: 54
LOS: 1 days | Discharge: HOME | End: 2024-08-20
Attending: STUDENT IN AN ORGANIZED HEALTH CARE EDUCATION/TRAINING PROGRAM | Admitting: STUDENT IN AN ORGANIZED HEALTH CARE EDUCATION/TRAINING PROGRAM
Payer: MEDICARE

## 2024-08-20 VITALS
HEART RATE: 52 BPM | SYSTOLIC BLOOD PRESSURE: 132 MMHG | WEIGHT: 177.03 LBS | OXYGEN SATURATION: 98 % | RESPIRATION RATE: 18 BRPM | DIASTOLIC BLOOD PRESSURE: 73 MMHG | TEMPERATURE: 99 F

## 2024-08-20 DIAGNOSIS — Z41.9 ENCOUNTER FOR PROCEDURE FOR PURPOSES OTHER THAN REMEDYING HEALTH STATE, UNSPECIFIED: Chronic | ICD-10-CM

## 2024-08-20 DIAGNOSIS — Z95.1 PRESENCE OF AORTOCORONARY BYPASS GRAFT: Chronic | ICD-10-CM

## 2024-08-20 DIAGNOSIS — Z95.828 PRESENCE OF OTHER VASCULAR IMPLANTS AND GRAFTS: Chronic | ICD-10-CM

## 2024-08-20 PROCEDURE — 73610 X-RAY EXAM OF ANKLE: CPT | Mod: 26,LT

## 2024-08-20 PROCEDURE — 73610 X-RAY EXAM OF ANKLE: CPT

## 2024-08-20 PROCEDURE — 99284 EMERGENCY DEPT VISIT MOD MDM: CPT

## 2024-08-20 PROCEDURE — 93971 EXTREMITY STUDY: CPT

## 2024-08-20 PROCEDURE — 99284 EMERGENCY DEPT VISIT MOD MDM: CPT | Mod: 25

## 2024-08-20 PROCEDURE — 93971 EXTREMITY STUDY: CPT | Mod: 26,LT

## 2024-08-20 NOTE — ED PROVIDER NOTE - CLINICAL SUMMARY MEDICAL DECISION MAKING FREE TEXT BOX
54-year-old male with past medical history of aortic dissection (status post aortic bifurcation bypass graft disease, EVAR), CAD (status post CABG), DVT lower extremities, hypertension, pulmonary embolism, keratitis (status postsurgery), seizure disorder p/w 1 week history of atraumatic left ankle swelling and pain. On exam, VS are wnl, +edema of L ankle, BLE NVI.    ddx: dvt vs superficial thrombosis vs msk strain. minimal erythema, no warmth, do not suspect cellulitis vs fx vs dislocation    Plan:  - XR: ?chronic/old fx of L fibula, no acute fx/dislocation seen in ankle/foot  - US LLE  - pt declines pain meds

## 2024-08-20 NOTE — ED PROVIDER NOTE - PROGRESS NOTE DETAILS
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 No   dislocations or acute appearing fractures.    Congruent ankle mortise with smooth and intact talar dome. Preserved   remaining visualized joint spaces and no joint margin erosions.    No calcaneal spurring and unremarkable appearing Achilles tendon shadow.    No lytic or blastic lesions.  - US:   IMPRESSION:  No evidence of left lower extremity deep venous thrombosis.    Cystic structure in the right groin again identified seen on prior   ultrasound and CT  New left groin cystic structure not previously seen.  - On assessment, chaperone RN, patient shows cystic structure or mass in right groin as well as left anterior medial leg, states he is aware of both of those and his doctors are aware both of those, they are unsure of the etiology.  He states the left-sided cystic structure has actually improved and gone smaller.  Told patient to follow-up in 1 week for repeat ultrasound given his history of DVT and absent DVT on this exam given left ankle swelling.  Patient states understanding.  Will DC with

## 2024-08-20 NOTE — ED PROVIDER NOTE - NSFOLLOWUPINSTRUCTIONS_ED_ALL_ED_FT
Please reach out to Jovanna Richards ([facility] ED clinical referral coordinator) to assist you with your follow-up appointment.     Monday - Friday 11am-7pm  [phone]  [email or web address]    1. You were seen for ankle swelling. A copy of any of your resulted labs, imaging, and findings have been provided to you. Make sure to view any test results that may not have yet resulted at the time of your discharge by creating a FollowMyHealth account at: [email or web address] to sign up for FollowMyHealth. Please review all of your lab, imaging, and all other results in their entirety with your primary care doctor.   2. Continue to take your home medications as prescribed.   3. Follow up with your primary care doctor within 48 hours to assess the symptoms you were seen for in the emergency department and to review all results from your visit. If you don't have a doctor, call [phone] to make an appointment.  4. Return immediately to the emergency department for new, persistent, or worsening symptoms or signs. Return immediately to the emergency department if you have chest pain, shortness of breath, loss of consciousness, leg swelling, redness, chest pain, or shortness of breath, numbness, or weakness.   5. For your for health, you should make healthy food choices and be physically active. Also, you should not smoke or use drugs, and you should not drink alcohol in excess. Please visit [email or web address] for resources and more information.

## 2024-08-20 NOTE — ED PROVIDER NOTE - PATIENT PORTAL LINK FT
You can access the FollowMyHealth Patient Portal offered by [facility] by registering at the following website: [email or web address]. By joining [facility]’s FollowMyHealth portal, you will also be able to view your health information using other applications (apps) compatible with our system.

## 2024-08-20 NOTE — ED PROVIDER NOTE - NS ED ROS FT
Positive: Left ankle swelling and pain   negative: Fever, chills, congestion, cough, hemoptysis, chest pain, shortness of breath, nausea, vomiting, diarrhea, abdominal pain dysuria, hematuria, rash, numbness, weakness, trauma

## 2024-08-20 NOTE — ED ADULT NURSE NOTE - OBJECTIVE STATEMENT
Patient presents to ER AOx4 speaking in full clear sentences c/o left ankle pain and swelling x1week. denies fall/injury.

## 2024-08-20 NOTE — ED PROVIDER NOTE - OBJECTIVE STATEMENT
54-year-old male with past medical history of aortic dissection (status post aortic bifurcation bypass graft disease, EVAR), CAD (status post CABG), DVT lower extremities, hypertension, pulmonary embolism, keratitis (status postsurgery), seizure disorder p/w 1 week history of atraumatic left ankle swelling and pain.  Trauma.  Denies history of similar symptoms.  Denies numbness no recent travel, surgery, hospitalization.  No hormone use.  Patient takes aspirin his only blood thinner.

## 2024-08-20 NOTE — ED ADULT NURSE NOTE - NSFALLUNIVINTERV_ED_ALL_ED
Bed/Stretcher in lowest position, wheels locked, appropriate side rails in place/Call bell, personal items and telephone in reach/Instruct patient to call for assistance before getting out of bed/chair/stretcher/Non-slip footwear applied when patient is off stretcher/[place] to call system/Physically safe environment - no spills, clutter or unnecessary equipment/Purposeful proactive rounding/Room/bathroom lighting operational, light cord in reach

## 2024-08-20 NOTE — ED PROVIDER NOTE - PHYSICAL EXAMINATION
GENERAL:  Awake, alert and in NAD, non-toxic appearing  ENMT: Airway patent  EYES: conjunctiva clear  CARDIAC: Regular rate, regular rhythm.  Heart sounds S1, S2, no S3, S4. No murmurs, rubs or gallops.  RESPIRATORY: Breath sounds clear and equal in bilateral anterior lung fields, no wheezes/ronchi/crackles/stridor; pt breathing and speaking comfortably with no increased WOB, no accessory mm. use, no intercostal retractions, no nasal flaring  GI: abdomen soft, non-distended, non-tender, no rebound or guarding no pulsatile masses   SKIN: warm and dry, no rashes  PSYCH: awake, alert, calm and cooperative  EXTREMITIES: +L ankle swelling on medial aspect, minimal erythema, no warmth/crepitus/fluctuance/induration/ttp  BLE: 5/5 motor strength bilat hip flexors, knee flexors and extensors, plantar and dorsiflexors, hallux flexors and extensors. sensation intact to light touch bilat L3-S1; 2+ DP pulses bilat; compartment soft, skin warm and dry   NEURO: gcs 15

## 2024-08-22 DIAGNOSIS — I10 ESSENTIAL (PRIMARY) HYPERTENSION: ICD-10-CM

## 2024-08-22 DIAGNOSIS — Z95.1 PRESENCE OF AORTOCORONARY BYPASS GRAFT: ICD-10-CM

## 2024-08-22 DIAGNOSIS — G40.909 EPILEPSY, UNSPECIFIED, NOT INTRACTABLE, WITHOUT STATUS EPILEPTICUS: ICD-10-CM

## 2024-08-22 DIAGNOSIS — I25.10 ATHEROSCLEROTIC HEART DISEASE OF NATIVE CORONARY ARTERY WITHOUT ANGINA PECTORIS: ICD-10-CM

## 2024-08-22 DIAGNOSIS — Z86.718 PERSONAL HISTORY OF OTHER VENOUS THROMBOSIS AND EMBOLISM: ICD-10-CM

## 2024-08-22 DIAGNOSIS — M25.572 PAIN IN LEFT ANKLE AND JOINTS OF LEFT FOOT: ICD-10-CM

## 2024-08-22 DIAGNOSIS — Z86.711 PERSONAL HISTORY OF PULMONARY EMBOLISM: ICD-10-CM

## 2024-08-22 NOTE — DISCHARGE NOTE PROVIDER - PROVIDER RX CONTACT NUMBER
Patient's pharmacy, [facility], told him his refill for semaglutide, 0.25 or 0.5 mg/dose, (Ozempic, 0.25 or 0.5 MG/DOSE,) 2 mg/3 mL injection pen, was to [facility]. He needs it sent to the [facility]. He needs it by tomorrow.    Reason for call:   [x] Refill   [] Prior Auth  [] Other:     Office:   [] PCP/Provider -   [x] Specialty/Provider - Endo        Does the patient have enough for 3 days?   [] Yes   [x] No - Send as HP to POD    
[phone]

## 2024-08-29 ENCOUNTER — APPOINTMENT (OUTPATIENT)
Dept: UROLOGY | Facility: CLINIC | Age: 54
End: 2024-08-29

## 2024-08-29 DIAGNOSIS — N52.9 MALE ERECTILE DYSFUNCTION, UNSPECIFIED: ICD-10-CM

## 2024-08-29 DIAGNOSIS — R39.9 UNSPECIFIED SYMPTOMS AND SIGNS INVOLVING THE GENITOURINARY SYSTEM: ICD-10-CM

## 2024-08-29 DIAGNOSIS — R79.89 OTHER SPECIFIED ABNORMAL FINDINGS OF BLOOD CHEMISTRY: ICD-10-CM

## 2024-08-29 DIAGNOSIS — M79.89 OTHER SPECIFIED SOFT TISSUE DISORDERS: ICD-10-CM

## 2024-08-29 PROCEDURE — G2211 COMPLEX E/M VISIT ADD ON: CPT

## 2024-08-29 PROCEDURE — 99215 OFFICE O/P EST HI 40 MIN: CPT

## 2024-08-29 PROCEDURE — 51798 US URINE CAPACITY MEASURE: CPT

## 2024-08-29 PROCEDURE — 51741 ELECTRO-UROFLOWMETRY FIRST: CPT

## 2024-08-29 NOTE — ASSESSMENT
[FreeTextEntry1] :  Mr. Ovidio Villalobos  presents with erectile dysfunction. He isaccompanied by his wife.. HeIs a 58-year-old gentleman with a complex medical history which includes aortic aneurysm dissection complicated by hemorrhagic pancreatitis pneumonia sepsis and multiple surgical procedures who now presents with a complaint of erectile dysfunction.  He states his erectile dysfunction started immediately after his surgical recovery.  He states that he has a maintaining libido.  He and his wife are rarely sexually active.  He is unable to achieve an erection or orgasm.  He also presents with a history of having been diagnosed with low testosterone.  He was treated with transdermal testosterone for 2 months.  He was placing this in the evening rather than the morning.  He reports that there was no increase in the therapeutic levels although this is not documented in the chart.  He did not have any change in his erectile function.  He states that he is no longer able to get testosterone transdermally.  He does have bilateral testicular atrophy which is consistent with his diagnosis of hypogonadism.  He also complains of lower urinary tract symptoms.  His flow demonstrates a maximum flow of 11.9 cc/s.  He has a postvoid residual of 23 cc.  His primary complaint is nocturia.  We discussed the impact of his edema (peripheral edema) on nocturia and the management of this prior to adding any alpha blockers to his regimen.   An extensive discussion with this couple regarding the potential etiologies of his sexual dysfunction.  Certainly in light of his vascular disease and surgery 1 would expect a vasculogenic etiology.  However it is interesting that he has excellent pulses in the dorsalis pedis and posterior tibialis bilaterally.  We discussed that this does not mean that there is not vascular disease affecting his erections.  We discussed the need to proceed with penile duplex ultrasound with intracavernous injection for this diagnosis.  The patient is unwilling to proceed with this at this time as he is concerned regarding injections.  We discussed his testosterone levels.  We discussed the appropriate timing for the pplication of transdermal testosterone in the morning.  We discussed the utilization of IM testosterone cypionate as this would be a more affordable option.  We discussed the need for medical clearance in light of his cardiovascular disease prior to testosterone administration and/or phosphodiesterase administration.  We had an extensive discussion re Risks of T replacement; Patient was informed about Black box warning from FDA. We discussed recent data regarding increased risk of coronary plaque size, heart disease; thrombolic event; increased Hbg/Hct, breast tenderness; acne; irritability; sleep apnea and increased urinary symptoms; effect on testicular function including suppression of spermatogenesis; hair loss (male pattern baldness) effect on LFTS; effect on prostate cancer. Different treatment approaches were discussed including IM, transdermal and Testopel We discussed advantages and disadvantages of each We discussed risk to T exposure to partners and children from transdermal appoaches. Patient choses to proceed with transdermal T replacement  Discussed his lower urinary tract symptoms.  Although he may have some degree of prostatic obstruction he does have significant edema and his primary complaint is nocturia.  We therefore suggested follow-up by his cardiologist for fluid management prior to additional medications.  He discussed the leg/inguinal masses.  THey both note that they have been resolving but continue to be anxious We discussed continued followup  Lastly we discussed the various etiologies of his sexual dysfunction including depression and anxiety after severe medical illness.  His wife endorses there is some component of this in his behavior.  Plan: 1.  Morning testosterone 2.  Follow-up cardiology regarding clearance for testosterone and/or phosphodiesterase inhibitor and/or penile duplex ultrasound evaluation 3.  Fluid management re: cardiology

## 2024-08-29 NOTE — PHYSICAL EXAM
[Abdomen Soft] : soft [Urethral Meatus] : meatus normal [Penis Abnormality] : normal circumcised penis [Prostate Tenderness] : the prostate was not tender [No Prostate Nodules] : no prostate nodules [de-identified] : Femoral, dorsalis pedis and posterior tibialis normal [de-identified] : midlline scar [de-identified] : right mass below inguinal ligamen; smooth nontender and sligthly mobile; left upper thigh mass; marked atrophic testes bilaterally; flow 11/9 cc sec; volume 98; PVR 24

## 2024-08-29 NOTE — PHYSICAL EXAM
[Abdomen Soft] : soft [Urethral Meatus] : meatus normal [Penis Abnormality] : normal circumcised penis [Prostate Tenderness] : the prostate was not tender [No Prostate Nodules] : no prostate nodules [de-identified] : Femoral, dorsalis pedis and posterior tibialis normal [de-identified] : midlline scar [de-identified] : right mass below inguinal ligamen; smooth nontender and sligthly mobile; left upper thigh mass; marked atrophic testes bilaterally; flow 11/9 cc sec; volume 98; PVR 24

## 2024-08-29 NOTE — HISTORY OF PRESENT ILLNESS
[FreeTextEntry1] : THIS IS A RECREATED NOTE AS ORIGINAL NOTE LOST   Mr. 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   He presented for  consultation due to erectile dysfunction; low testosterone and nocturia  This was complicated by hemorrhagic pancreatitis, pneumonia, sepsis, month and multiple surgical abdominal drainage procedures.  Since the time of his surgery he states that he has not been able to have erections.  He has not been able to achieve orgasm.  He states that he and his wife are sexually active.  His wife accompanied him at today's visit.  He also complains of nocturia.  He denies any significant diurnal frequency.  He notes some decrease in the strength of his urinary stream.  Denies any hematuria or dysuria  He was also diagnosed as having hypogonadism and was started on transdermal testosterone by his primary care physician.  He was on this for 2 months without any appreciable change.  He was unable to continue this due to insurance reasons.  It is not clear if testosterone levels were monitored on testosterone trans dermally to see if there was a therapeutic increase.  He states that he developed complete erectile dysfuncton after an aortic dissection repair in and pancreatitis. Previously, his erections were normal. He also complaints of slow urination and waking up multiple times at night.   No known drug allergies   Family history no  prostate, kidney, or bladder cancer.   He smokes marijuana daily but does not use any other drugs or tobacco.   He is currently not on testosterone replacement therapy due to insurance issues.   -   - Medications:     - Amlodipine     - Hydrochlorothiazide     - Aptiom     - Aspirin     - Lacosamide     - Baciparol     - Atorvastatin     - Multivitamins   - Allergies: None

## 2024-08-29 NOTE — HISTORY OF PRESENT ILLNESS
[FreeTextEntry1] : THIS IS A RECREATED NOTE AS ORIGINAL NOTE LOST   Mr. 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   He presented for  consultation due to erectile dysfunction; low testosterone and nocturia  This was complicated by hemorrhagic pancreatitis, pneumonia, sepsis, month and multiple surgical abdominal drainage procedures.  Since the time of his surgery he states that he has not been able to have erections.  He has not been able to achieve orgasm.  He states that he and his wife are sexually active.  His wife accompanied him at today's visit.  He also complains of nocturia.  He denies any significant diurnal frequency.  He notes some decrease in the strength of his urinary stream.  Denies any hematuria or dysuria  He was also diagnosed as having hypogonadism and was started on transdermal testosterone by his primary care physician.  He was on this for 2 months without any appreciable change.  He was unable to continue this due to insurance reasons.  It is not clear if testosterone levels were monitored on testosterone trans dermally to see if there was a therapeutic increase.  He states that he developed complete erectile dysfuncton after an aortic dissection repair in and pancreatitis. Previously, his erections were normal. He also complaints of slow urination and waking up multiple times at night.   No known drug allergies   Family history no  prostate, kidney, or bladder cancer.   He smokes marijuana daily but does not use any other drugs or tobacco.   He is currently not on testosterone replacement therapy due to insurance issues.   -   - Medications:     - Amlodipine     - Hydrochlorothiazide     - Aptiom     - Aspirin     - Lacosamide     - Baciparol     - Atorvastatin     - Multivitamins   - Allergies: None

## 2024-09-10 ENCOUNTER — RX RENEWAL (OUTPATIENT)
Age: 54
End: 2024-09-10

## 2024-09-18 ENCOUNTER — RX RENEWAL (OUTPATIENT)
Age: 54
End: 2024-09-18

## 2024-09-29 LAB
TESTOST FREE SERPL-MCNC: 0.1 PG/ML
TESTOST SERPL-MCNC: 3.7 NG/DL

## 2024-10-03 ENCOUNTER — APPOINTMENT (OUTPATIENT)
Dept: UROLOGY | Facility: CLINIC | Age: 54
End: 2024-10-03
Payer: MEDICARE

## 2024-10-03 VITALS
HEART RATE: 57 BPM | OXYGEN SATURATION: 96 % | SYSTOLIC BLOOD PRESSURE: 123 MMHG | DIASTOLIC BLOOD PRESSURE: 59 MMHG | TEMPERATURE: 97.7 F

## 2024-10-03 DIAGNOSIS — N52.9 MALE ERECTILE DYSFUNCTION, UNSPECIFIED: ICD-10-CM

## 2024-10-03 DIAGNOSIS — R39.9 UNSPECIFIED SYMPTOMS AND SIGNS INVOLVING THE GENITOURINARY SYSTEM: ICD-10-CM

## 2024-10-03 DIAGNOSIS — D64.9 ANEMIA, UNSPECIFIED: ICD-10-CM

## 2024-10-03 DIAGNOSIS — R79.89 OTHER SPECIFIED ABNORMAL FINDINGS OF BLOOD CHEMISTRY: ICD-10-CM

## 2024-10-03 DIAGNOSIS — M79.89 OTHER SPECIFIED SOFT TISSUE DISORDERS: ICD-10-CM

## 2024-10-03 PROCEDURE — 99214 OFFICE O/P EST MOD 30 MIN: CPT

## 2024-10-04 ENCOUNTER — NON-APPOINTMENT (OUTPATIENT)
Age: 54
End: 2024-10-04

## 2024-10-04 ENCOUNTER — INPATIENT (INPATIENT)
Facility: HOSPITAL | Age: 54
LOS: 3 days | Discharge: HOME | DRG: 810 | End: 2024-10-08
Attending: INTERNAL MEDICINE | Admitting: STUDENT IN AN ORGANIZED HEALTH CARE EDUCATION/TRAINING PROGRAM
Payer: MEDICARE

## 2024-10-04 VITALS
HEIGHT: 72 IN | HEART RATE: 59 BPM | WEIGHT: 177.03 LBS | RESPIRATION RATE: 20 BRPM | OXYGEN SATURATION: 96 % | DIASTOLIC BLOOD PRESSURE: 77 MMHG | TEMPERATURE: 98 F | SYSTOLIC BLOOD PRESSURE: 138 MMHG

## 2024-10-04 DIAGNOSIS — R00.1 BRADYCARDIA, UNSPECIFIED: ICD-10-CM

## 2024-10-04 DIAGNOSIS — D64.9 ANEMIA, UNSPECIFIED: ICD-10-CM

## 2024-10-04 DIAGNOSIS — Z86.79 PERSONAL HISTORY OF OTHER DISEASES OF THE CIRCULATORY SYSTEM: ICD-10-CM

## 2024-10-04 DIAGNOSIS — Z41.9 ENCOUNTER FOR PROCEDURE FOR PURPOSES OTHER THAN REMEDYING HEALTH STATE, UNSPECIFIED: Chronic | ICD-10-CM

## 2024-10-04 DIAGNOSIS — I34.0 NONRHEUMATIC MITRAL (VALVE) INSUFFICIENCY: ICD-10-CM

## 2024-10-04 DIAGNOSIS — D59.8 OTHER ACQUIRED HEMOLYTIC ANEMIAS: ICD-10-CM

## 2024-10-04 DIAGNOSIS — I10 ESSENTIAL (PRIMARY) HYPERTENSION: ICD-10-CM

## 2024-10-04 DIAGNOSIS — G40.909 EPILEPSY, UNSPECIFIED, NOT INTRACTABLE, WITHOUT STATUS EPILEPTICUS: ICD-10-CM

## 2024-10-04 DIAGNOSIS — Z86.718 PERSONAL HISTORY OF OTHER VENOUS THROMBOSIS AND EMBOLISM: ICD-10-CM

## 2024-10-04 DIAGNOSIS — Z95.828 PRESENCE OF OTHER VASCULAR IMPLANTS AND GRAFTS: Chronic | ICD-10-CM

## 2024-10-04 DIAGNOSIS — Z95.1 PRESENCE OF AORTOCORONARY BYPASS GRAFT: Chronic | ICD-10-CM

## 2024-10-04 DIAGNOSIS — Z86.711 PERSONAL HISTORY OF PULMONARY EMBOLISM: ICD-10-CM

## 2024-10-04 DIAGNOSIS — I36.1 NONRHEUMATIC TRICUSPID (VALVE) INSUFFICIENCY: ICD-10-CM

## 2024-10-04 DIAGNOSIS — E78.5 HYPERLIPIDEMIA, UNSPECIFIED: ICD-10-CM

## 2024-10-04 DIAGNOSIS — Z95.828 PRESENCE OF OTHER VASCULAR IMPLANTS AND GRAFTS: ICD-10-CM

## 2024-10-04 DIAGNOSIS — F12.99 CANNABIS USE, UNSPECIFIED WITH UNSPECIFIED CANNABIS-INDUCED DISORDER: ICD-10-CM

## 2024-10-04 DIAGNOSIS — Z29.9 ENCOUNTER FOR PROPHYLACTIC MEASURES, UNSPECIFIED: ICD-10-CM

## 2024-10-04 DIAGNOSIS — I27.20 PULMONARY HYPERTENSION, UNSPECIFIED: ICD-10-CM

## 2024-10-04 DIAGNOSIS — I25.10 ATHEROSCLEROTIC HEART DISEASE OF NATIVE CORONARY ARTERY WITHOUT ANGINA PECTORIS: ICD-10-CM

## 2024-10-04 DIAGNOSIS — Z95.3 PRESENCE OF XENOGENIC HEART VALVE: ICD-10-CM

## 2024-10-04 DIAGNOSIS — Z95.1 PRESENCE OF AORTOCORONARY BYPASS GRAFT: ICD-10-CM

## 2024-10-04 DIAGNOSIS — I16.0 HYPERTENSIVE URGENCY: ICD-10-CM

## 2024-10-04 DIAGNOSIS — Z87.19 PERSONAL HISTORY OF OTHER DISEASES OF THE DIGESTIVE SYSTEM: ICD-10-CM

## 2024-10-04 DIAGNOSIS — Z79.82 LONG TERM (CURRENT) USE OF ASPIRIN: ICD-10-CM

## 2024-10-04 LAB
ACANTHOCYTES BLD QL SMEAR: SLIGHT — SIGNIFICANT CHANGE UP
ADD ON TEST-SPECIMEN IN LAB: SIGNIFICANT CHANGE UP
ALBUMIN SERPL ELPH-MCNC: 3.9 G/DL — SIGNIFICANT CHANGE UP (ref 3.3–5)
ALP SERPL-CCNC: 123 U/L — HIGH (ref 40–120)
ALT FLD-CCNC: 31 U/L — SIGNIFICANT CHANGE UP (ref 10–45)
ANION GAP SERPL CALC-SCNC: 9 MMOL/L — SIGNIFICANT CHANGE UP (ref 5–17)
ANISOCYTOSIS BLD QL: SLIGHT — SIGNIFICANT CHANGE UP
APPEARANCE: ABNORMAL
APTT BLD: 34.7 SEC — SIGNIFICANT CHANGE UP (ref 24.5–35.6)
AST SERPL-CCNC: 56 U/L — HIGH (ref 10–40)
BACTERIA: NEGATIVE /HPF
BASOPHILS # BLD AUTO: 0.06 K/UL — SIGNIFICANT CHANGE UP (ref 0–0.2)
BASOPHILS # BLD AUTO: 0.14 K/UL — SIGNIFICANT CHANGE UP (ref 0–0.2)
BASOPHILS NFR BLD AUTO: 0.9 % — SIGNIFICANT CHANGE UP (ref 0–2)
BASOPHILS NFR BLD AUTO: 2.6 % — HIGH (ref 0–2)
BILIRUB SERPL-MCNC: 0.6 MG/DL — SIGNIFICANT CHANGE UP (ref 0.2–1.2)
BILIRUBIN URINE: NEGATIVE
BLD GP AB SCN SERPL QL: NEGATIVE — SIGNIFICANT CHANGE UP
BLOOD URINE: ABNORMAL
BUN SERPL-MCNC: 25 MG/DL — HIGH (ref 7–23)
CALCIUM SERPL-MCNC: 8.7 MG/DL — SIGNIFICANT CHANGE UP (ref 8.4–10.5)
CAST: 0 /LPF
CHLORIDE SERPL-SCNC: 106 MMOL/L — SIGNIFICANT CHANGE UP (ref 96–108)
CO2 SERPL-SCNC: 26 MMOL/L — SIGNIFICANT CHANGE UP (ref 22–31)
COLOR: NORMAL
CREAT SERPL-MCNC: 1.14 MG/DL — SIGNIFICANT CHANGE UP (ref 0.5–1.3)
DACRYOCYTES BLD QL SMEAR: SLIGHT — SIGNIFICANT CHANGE UP
EGFR: 76 ML/MIN/1.73M2 — SIGNIFICANT CHANGE UP
EOSINOPHIL # BLD AUTO: 0.26 K/UL — SIGNIFICANT CHANGE UP (ref 0–0.5)
EOSINOPHIL # BLD AUTO: 0.32 K/UL — SIGNIFICANT CHANGE UP (ref 0–0.5)
EOSINOPHIL NFR BLD AUTO: 3.9 % — SIGNIFICANT CHANGE UP (ref 0–6)
EOSINOPHIL NFR BLD AUTO: 6.1 % — HIGH (ref 0–6)
EPITHELIAL CELLS: 2 /HPF
GIANT PLATELETS BLD QL SMEAR: PRESENT — SIGNIFICANT CHANGE UP
GLUCOSE QUALITATIVE U: NEGATIVE MG/DL
GLUCOSE SERPL-MCNC: 128 MG/DL — HIGH (ref 70–99)
HCT VFR BLD CALC: 19.6 % — CRITICAL LOW (ref 39–50)
HCT VFR BLD CALC: 25.7 % — LOW (ref 39–50)
HCT VFR BLD CALC: 27.8 %
HGB BLD-MCNC: 5.9 G/DL — CRITICAL LOW (ref 13–17)
HGB BLD-MCNC: 7.9 G/DL — LOW (ref 13–17)
HGB BLD-MCNC: 8.4 G/DL
HYPOCHROMIA BLD QL: SIGNIFICANT CHANGE UP
IMM GRANULOCYTES NFR BLD AUTO: 0.3 % — SIGNIFICANT CHANGE UP (ref 0–0.9)
INR BLD: 1.04 — SIGNIFICANT CHANGE UP (ref 0.85–1.16)
IRON SATN MFR SERPL: 20 %
IRON SERPL-MCNC: 43 UG/DL
KETONES URINE: NEGATIVE MG/DL
LEUKOCYTE ESTERASE URINE: NEGATIVE
LYMPHOCYTES # BLD AUTO: 1.05 K/UL — SIGNIFICANT CHANGE UP (ref 1–3.3)
LYMPHOCYTES # BLD AUTO: 1.27 K/UL — SIGNIFICANT CHANGE UP (ref 1–3.3)
LYMPHOCYTES # BLD AUTO: 19 % — SIGNIFICANT CHANGE UP (ref 13–44)
LYMPHOCYTES # BLD AUTO: 20.2 % — SIGNIFICANT CHANGE UP (ref 13–44)
MACROCYTES BLD QL: SLIGHT — SIGNIFICANT CHANGE UP
MANUAL SMEAR VERIFICATION: SIGNIFICANT CHANGE UP
MCHC RBC-ENTMCNC: 29.2 PG — SIGNIFICANT CHANGE UP (ref 27–34)
MCHC RBC-ENTMCNC: 29.6 PG — SIGNIFICANT CHANGE UP (ref 27–34)
MCHC RBC-ENTMCNC: 29.8 PG
MCHC RBC-ENTMCNC: 30.1 GM/DL — LOW (ref 32–36)
MCHC RBC-ENTMCNC: 30.2 GM/DL
MCHC RBC-ENTMCNC: 30.7 GM/DL — LOW (ref 32–36)
MCV RBC AUTO: 94.8 FL — SIGNIFICANT CHANGE UP (ref 80–100)
MCV RBC AUTO: 98.5 FL — SIGNIFICANT CHANGE UP (ref 80–100)
MCV RBC AUTO: 98.6 FL
MICROCYTES BLD QL: SLIGHT — SIGNIFICANT CHANGE UP
MICROSCOPIC-UA: NORMAL
MONOCYTES # BLD AUTO: 0.41 K/UL — SIGNIFICANT CHANGE UP (ref 0–0.9)
MONOCYTES # BLD AUTO: 0.79 K/UL — SIGNIFICANT CHANGE UP (ref 0–0.9)
MONOCYTES NFR BLD AUTO: 11.8 % — SIGNIFICANT CHANGE UP (ref 2–14)
MONOCYTES NFR BLD AUTO: 7.9 % — SIGNIFICANT CHANGE UP (ref 2–14)
NEUTROPHILS # BLD AUTO: 3.3 K/UL — SIGNIFICANT CHANGE UP (ref 1.8–7.4)
NEUTROPHILS # BLD AUTO: 4.3 K/UL — SIGNIFICANT CHANGE UP (ref 1.8–7.4)
NEUTROPHILS NFR BLD AUTO: 63.2 % — SIGNIFICANT CHANGE UP (ref 43–77)
NEUTROPHILS NFR BLD AUTO: 64.1 % — SIGNIFICANT CHANGE UP (ref 43–77)
NITRITE URINE: NEGATIVE
NRBC # BLD: 0 /100 WBCS — SIGNIFICANT CHANGE UP (ref 0–0)
OVALOCYTES BLD QL SMEAR: SLIGHT — SIGNIFICANT CHANGE UP
PH URINE: 6
PLAT MORPH BLD: ABNORMAL
PLATELET # BLD AUTO: 158 K/UL — SIGNIFICANT CHANGE UP (ref 150–400)
PLATELET # BLD AUTO: 194 K/UL — SIGNIFICANT CHANGE UP (ref 150–400)
PLATELET # BLD AUTO: 247 K/UL
POIKILOCYTOSIS BLD QL AUTO: SIGNIFICANT CHANGE UP
POLYCHROMASIA BLD QL SMEAR: SLIGHT — SIGNIFICANT CHANGE UP
POTASSIUM SERPL-MCNC: 4.4 MMOL/L — SIGNIFICANT CHANGE UP (ref 3.5–5.3)
POTASSIUM SERPL-SCNC: 4.4 MMOL/L — SIGNIFICANT CHANGE UP (ref 3.5–5.3)
PROT SERPL-MCNC: 6.8 G/DL — SIGNIFICANT CHANGE UP (ref 6–8.3)
PROTEIN URINE: 30 MG/DL
PROTHROM AB SERPL-ACNC: 12.2 SEC — SIGNIFICANT CHANGE UP (ref 9.9–13.4)
RBC # BLD: 1.99 M/UL — LOW (ref 4.2–5.8)
RBC # BLD: 2.71 M/UL — LOW (ref 4.2–5.8)
RBC # BLD: 2.82 M/UL
RBC # FLD: 23.8 % — HIGH (ref 10.3–14.5)
RBC # FLD: 26.2 %
RBC # FLD: 26.7 % — HIGH (ref 10.3–14.5)
RBC BLD AUTO: ABNORMAL
RED BLOOD CELLS URINE: 27 /HPF
REVIEW: NORMAL
RH IG SCN BLD-IMP: POSITIVE — SIGNIFICANT CHANGE UP
SCHISTOCYTES BLD QL AUTO: SIGNIFICANT CHANGE UP
SODIUM SERPL-SCNC: 141 MMOL/L — SIGNIFICANT CHANGE UP (ref 135–145)
SPECIFIC GRAVITY URINE: 1.02
TIBC SERPL-MCNC: 219 UG/DL
UIBC SERPL-MCNC: 176 UG/DL
UROBILINOGEN URINE: 0.2 MG/DL
WBC # BLD: 5.22 K/UL — SIGNIFICANT CHANGE UP (ref 3.8–10.5)
WBC # BLD: 6.7 K/UL — SIGNIFICANT CHANGE UP (ref 3.8–10.5)
WBC # FLD AUTO: 5.22 K/UL — SIGNIFICANT CHANGE UP (ref 3.8–10.5)
WBC # FLD AUTO: 6.45 K/UL
WBC # FLD AUTO: 6.7 K/UL — SIGNIFICANT CHANGE UP (ref 3.8–10.5)
WHITE BLOOD CELLS URINE: 1 /HPF

## 2024-10-04 PROCEDURE — 93010 ELECTROCARDIOGRAM REPORT: CPT

## 2024-10-04 PROCEDURE — 99223 1ST HOSP IP/OBS HIGH 75: CPT

## 2024-10-04 PROCEDURE — 99285 EMERGENCY DEPT VISIT HI MDM: CPT | Mod: FS

## 2024-10-04 RX ORDER — SODIUM CHLORIDE IRRIG SOLUTION 0.9 %
1000 SOLUTION, IRRIGATION IRRIGATION ONCE
Refills: 0 | Status: COMPLETED | OUTPATIENT
Start: 2024-10-04 | End: 2024-10-04

## 2024-10-04 RX ORDER — CLINDAMYCIN PHOSPHATE 150 MG/ML
300 INJECTION, SOLUTION INTRAVENOUS EVERY 6 HOURS
Refills: 0 | Status: DISCONTINUED | OUTPATIENT
Start: 2024-10-04 | End: 2024-10-08

## 2024-10-04 RX ORDER — ATORVASTATIN CALCIUM 10 MG/1
20 TABLET, FILM COATED ORAL AT BEDTIME
Refills: 0 | Status: DISCONTINUED | OUTPATIENT
Start: 2024-10-04 | End: 2024-10-08

## 2024-10-04 RX ORDER — ONDANSETRON HCL/PF 4 MG/2 ML
4 VIAL (ML) INJECTION EVERY 8 HOURS
Refills: 0 | Status: DISCONTINUED | OUTPATIENT
Start: 2024-10-04 | End: 2024-10-08

## 2024-10-04 RX ORDER — ACETAMINOPHEN 325 MG
650 TABLET ORAL EVERY 6 HOURS
Refills: 0 | Status: DISCONTINUED | OUTPATIENT
Start: 2024-10-04 | End: 2024-10-07

## 2024-10-04 RX ORDER — METOPROLOL TARTRATE 50 MG
200 TABLET ORAL DAILY
Refills: 0 | Status: DISCONTINUED | OUTPATIENT
Start: 2024-10-04 | End: 2024-10-04

## 2024-10-04 RX ORDER — LACOSAMIDE 10 MG/ML
200 SOLUTION ORAL
Refills: 0 | Status: DISCONTINUED | OUTPATIENT
Start: 2024-10-04 | End: 2024-10-05

## 2024-10-04 RX ORDER — MAG HYDROX/ALUMINUM HYD/SIMETH 200-200-20
30 SUSPENSION, ORAL (FINAL DOSE FORM) ORAL EVERY 4 HOURS
Refills: 0 | Status: DISCONTINUED | OUTPATIENT
Start: 2024-10-04 | End: 2024-10-08

## 2024-10-04 RX ORDER — PANTOPRAZOLE SODIUM 40 MG/1
40 TABLET, DELAYED RELEASE ORAL
Refills: 0 | Status: DISCONTINUED | OUTPATIENT
Start: 2024-10-04 | End: 2024-10-08

## 2024-10-04 RX ORDER — ESLICARBAZEPINE ACETATE 600 MG/1
800 TABLET ORAL AT BEDTIME
Refills: 0 | Status: DISCONTINUED | OUTPATIENT
Start: 2024-10-04 | End: 2024-10-06

## 2024-10-04 RX ORDER — 5-HYDROXYTRYPTOPHAN (5-HTP) 100 MG
3 TABLET,DISINTEGRATING ORAL AT BEDTIME
Refills: 0 | Status: DISCONTINUED | OUTPATIENT
Start: 2024-10-04 | End: 2024-10-08

## 2024-10-04 RX ORDER — AMLODIPINE BESYLATE 5 MG
10 TABLET ORAL DAILY
Refills: 0 | Status: DISCONTINUED | OUTPATIENT
Start: 2024-10-04 | End: 2024-10-04

## 2024-10-04 RX ORDER — MULTI VITAMIN/MINERAL SUPPLEMENT WITH ASCORBIC ACID, NIACIN, PYRIDOXINE, PANTOTHENIC ACID, FOLIC ACID, RIBOFLAVIN, THIAMIN, BIOTIN, COBALAMIN AND ZINC. 60; 20; 12.5; 10; 10; 1.7; 1.5; 1; .3; .006 MG/1; MG/1; MG/1; MG/1; MG/1; MG/1; MG/1; MG/1; MG/1; MG/1
1 TABLET, COATED ORAL DAILY
Refills: 0 | Status: DISCONTINUED | OUTPATIENT
Start: 2024-10-04 | End: 2024-10-08

## 2024-10-04 RX ADMIN — Medication 650 MILLIGRAM(S): at 23:29

## 2024-10-04 RX ADMIN — ESLICARBAZEPINE ACETATE 800 MILLIGRAM(S): 600 TABLET ORAL at 23:15

## 2024-10-04 RX ADMIN — CLINDAMYCIN PHOSPHATE 300 MILLIGRAM(S): 150 INJECTION, SOLUTION INTRAVENOUS at 23:24

## 2024-10-04 RX ADMIN — LACOSAMIDE 200 MILLIGRAM(S): 10 SOLUTION ORAL at 23:17

## 2024-10-04 RX ADMIN — Medication 500 MILLILITER(S): at 21:31

## 2024-10-04 NOTE — ED ADULT NURSE NOTE - OBJECTIVE STATEMENT
pt is a 54  y.o. male c/o low hemoglobin, pt is ax4, on room air, pt has a hx of pancreatitis and aorta dissection, pt skin complexion is pale, pt appears fatigue, skin is dry and intact, vitals are stable.

## 2024-10-04 NOTE — H&P ADULT - HISTORY OF PRESENT ILLNESS
Patient is a 54 year old male with PMHx of         In the ED;  Vitals: T 98.4 F, HR 59, /77, RR 20 96% on RA  Labs: WBC 5.22, Hb 5.9. PT 12.2, INR 1.04, BUN 25, Cr 1.14,   Imaging: None  EKG: Sinus bailey 51 bpm  Consults: None  Interventions: 1 unit pRBC generalized Patient is a 54 year old male with PMHx of HLD, h/o aortic dissection s/p repair, seizure disorder, h/o DVT ( treated with apxiaban now off AC), CAD         In the ED;  Vitals: T 98.4 F, HR 59, /77, RR 20 96% on RA  Labs: WBC 5.22, Hb 5.9. PT 12.2, INR 1.04, BUN 25, Cr 1.14,   Imaging: None  EKG: Sinus bailey 51 bpm  Consults: None  Interventions: 1 unit pRBC Patient is a 54 year old male with PMHx of aortic dissection (status post aortic bifurcation bypass graft disease, EVAR), CAD (status post CABG 5/2023), DVT lower extremities, hypertension, pulmonary embolism, keratitis (status postsurgery), seizure disorder, presents for anemia (Hb 8) found on outpatient labs. He has been feeling weak and lightheaded for about four months and has been told his Hb has been low and downtrending for several months. Had a blood transfusion in May 2023. Today at his urologist, he got labs and was found to have anemia, so instructed to come to ED.    Had routine dental procedure on 10/1 and was prescribed Clindamycin 300 q6 for two weeks which he has been taking. Also has been taking ibuprofen 200 mg q6 for four days.     Denies fever, chills, chest pain, abdominal pain, NVD, hematochezia/melena, hematemesis, hematuria, headache, vision changes, palpitations, recent falls, cancer hx, dysuria.        In the ED;  Vitals: T 98.4 F, HR 59, /77, RR 20 96% on RA  Labs: WBC 5.22, Hb 5.9. PT 12.2, INR 1.04, BUN 25, Cr 1.14,   Imaging: None  EKG: Sinus bailey 51 bpm  Consults: None  Interventions: 1 unit pRBC Patient is a 54 year old male with PMHx of pancreatitis, aortic dissection (status post aortic bifurcation bypass graft disease, EVAR), CAD (status post CABG 5/2023), DVT lower extremities, hypertension, pulmonary embolism, keratitis (status postsurgery), seizure disorder, presents for anemia (Hb 8) found on outpatient labs. He has been feeling weak and lightheaded for about four months and has been told his Hb has been low and downtrending for several months. Had a blood transfusion in May 2023. Today at his urologist, he got labs and was found to have anemia, so instructed to come to ED.    Had routine dental procedure on 10/1 and was prescribed Clindamycin 300 q6 for two weeks which he has been taking. Also has been taking ibuprofen 200 mg q6 for four days.     Denies fever, chills, chest pain, abdominal pain, NVD, hematochezia/melena, hematemesis, hematuria, headache, vision changes, palpitations, recent falls, cancer hx, dysuria.        In the ED;  Vitals: T 98.4 F, HR 59, /77, RR 20 96% on RA  Labs: WBC 5.22, Hb 5.9. PT 12.2, INR 1.04, BUN 25, Cr 1.14,   Imaging: None  EKG: Sinus bailey 51 bpm  Consults: None  Interventions: 1 unit pRBC

## 2024-10-04 NOTE — ED ADULT TRIAGE NOTE - BP NONINVASIVE SYSTOLIC (MM HG)
Received a call hoping for refill of 90 day supply sent ot [facility] in [place].    Patient is due for annual exam.    Message given that 30 days approved i but due for an exam before this refill is completed.      notified and they will call and schedule exam.   138

## 2024-10-04 NOTE — ED PROVIDER NOTE - OBJECTIVE STATEMENT
53 y/o male w/ hx aortic dissection (status post aortic bifurcation bypass graft disease, EVAR), CAD (status post CABG), DVT lower extremities, hypertension, pulmonary embolism, keratitis (status postsurgery), seizure, anemia p/w hgb 8 found on outpatient labs today.  Pt notes has been feeling generally weak and occasionally lightheaded x 4 mo, states unchanged today.  States has been told his hgb has been trending down since Jan.  Has never seen hematologist and has never had colonoscopy.  Reports had blood transfusion in May 2023.  Saw his PMD in July for wkness/dizziness has been experiencing, reportedly found to have low testosterone, so sent to uro.  Saw uro today, who did BW, and found low hgb today, so sent pt to ED.  Pt denies f/c, cp, sob, palpitations, abd pain, n/v/d, black/bloody stools, hematuria, focal numbness/tingling.

## 2024-10-04 NOTE — ED ADULT TRIAGE NOTE - CHIEF COMPLAINT QUOTE
Pt presents to ED C/O abn labs with lightheadedness from urologist. Pt states, " My primary doctor said my testosterone was low so they sent me to the urologist the urologist called and said my hemoglobin was 8.0" Hx blood transfusion. Denies active bleeding/ black or bloody stools. EKG in progress.

## 2024-10-04 NOTE — HISTORY OF PRESENT ILLNESS
[FreeTextEntry1] : THIS IS A RECREATED NOTE AS ORIGINAL NOTE LOST   Mr. 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   He presented for  consultation due to erectile dysfunction; low testosterone and nocturia  This was complicated by hemorrhagic pancreatitis, pneumonia, sepsis, month and multiple surgical abdominal drainage procedures.  Since the time of his surgery he states that he has not been able to have erections.  He has not been able to achieve orgasm.  He states that he and his wife are sexually active.  His wife accompanied him at today's visit.  He also complains of nocturia.  He denies any significant diurnal frequency.  He notes some decrease in the strength of his urinary stream.  Denies any hematuria or dysuria  He was also diagnosed as having hypogonadism and was started on transdermal testosterone by his primary care physician.  He was on this for 2 months without any appreciable change.  He was unable to continue this due to insurance reasons.  It is not clear if testosterone levels were monitored on testosterone trans dermally to see if there was a therapeutic increase.  He states that he developed complete erectile dysfuncton after an aortic dissection repair in and pancreatitis. Previously, his erections were normal. He also complaints of slow urination and waking up multiple times at night.   No known drug allergies   Family history no  prostate, kidney, or bladder cancer.   He smokes marijuana daily but does not use any other drugs or tobacco.   He is currently not on testosterone replacement therapy due to insurance issues.   -   - Medications:     - Amlodipine     - Hydrochlorothiazide     - Aptiom     - Aspirin     - Lacosamide     - Baciparol     - Atorvastatin     - Multivitamins   - Allergies: None   10.3.2024 Patient returns regarding low testosterone and feeling of weakness

## 2024-10-04 NOTE — ED PROVIDER NOTE - NS ED ROS FT
CONSTITUTIONAL: Denies fever and chills    RESPIRATORY: Denies SOB     CARDIOVASCULAR: Denies palpitations and chest pain.    GASTROINTESTINAL: Denies abdominal pain, nausea, vomiting and diarrhea.    NEUROLOGICAL: See HPI    PSYCHIATRIC: Denies recent changes in mood. Denies anxiety and depression.

## 2024-10-04 NOTE — ED PROVIDER NOTE - CLINICAL SUMMARY MEDICAL DECISION MAKING FREE TEXT BOX
53 y/o male w/ hx aortic dissection (status post aortic bifurcation bypass graft disease, EVAR), CAD (status post CABG), DVT lower extremities, hypertension, pulmonary embolism, keratitis (status postsurgery), seizure, anemia p/w hgb 8 found on outpatient labs today.  Pt notes has been feeling generally weak and occasionally lightheaded x 4 mo, states unchanged today.  States has been told his hgb has been trending down since Jan.  Has never seen hematologist and has never had colonoscopy.  Reports had blood transfusion in May 2023.  Saw his PMD in July for wkness/dizziness has been experiencing, reportedly found to have low testosterone, so sent to uro.  Saw uro today, who did BW, and found low hgb today, so sent pt to ED.  Pt denies f/c, cp, sob, palpitations, abd pain, n/v/d, black/bloody stools, hematuria, focal numbness/tingling.  VS notable for HR 59, otherwise stable  Exam with slight pallor but otherwise unrevealing  Will plan to recheck labs today, ekg, consider transfusion as needed

## 2024-10-04 NOTE — H&P ADULT - PROBLEM SELECTOR PLAN 2
Continue home Amlodipine 10 mg and Bisoprolol 10 mg (formulary interchange metoprolol xr 200) Continue home Amlodipine 10 mg and     Hold Bisoprolol 10 mg (formulary interchange metoprolol xr 200) for bradycardia Hold Amlodipine 10 mg and     Hold Bisoprolol 10 mg (formulary interchange metoprolol xr 200) for bradycardia

## 2024-10-04 NOTE — H&P ADULT - NSHPPHYSICALEXAM_GEN_ALL_CORE
.  VITAL SIGNS:  T(F): 97.5 (10-04-24 @ 18:00), Max: 98.6 (10-04-24 @ 16:20)  HR: 51 (10-04-24 @ 18:00) (48 - 59)  BP: 122/60 (10-04-24 @ 18:00) (95/55 - 138/77)  BP(mean): --  RR: 16 (10-04-24 @ 18:00) (16 - 20)  SpO2: 97% (10-04-24 @ 18:00) (96% - 98%)    PHYSICAL EXAM:    Constitutional: resting comfortably in bed; NAD  HEENT: NC/AT, PERRL, EOMI, anicteric sclera, no nasal discharge; uvula midline, no oropharyngeal erythema or exudates; dry mucous membranes  Neck: supple; no JVD or thyromegaly  Respiratory: unlabored breathing, CTA B/L; no W/Rhonchi/Crackles, no retractions or use of accessory muscles   Cardiac: +S1/S2; RRR; Holosystolic whooshing diastolic murmur. No ventricular heaves, PMI non-displaced  Gastrointestinal: soft, NT/ND; no rebound or guarding; +BSx4. Surgical scars present  Back: spine midline, no bony tenderness or step-offs; no CVAT B/L  Extremities: WWP, no clubbing or cyanosis; no peripheral edema  Musculoskeletal: NROM x4; no joint swelling, tenderness or erythema  Vascular: 2+ radial, DP/PT pulses B/L  Dermatologic: skin warm, dry and intact; no rashes, wounds, or scars. No petechiae, hematoma  Lymphatic: no submandibular or cervical LAD  Neurologic: AAOx3; CNII-XII grossly intact; no focal deficits  Psychiatric: affect and characteristics of appearance, verbalizations, behaviors are appropriate, denies SI/HI/AH/VH .  VITAL SIGNS:  T(F): 97.5 (10-04-24 @ 18:00), Max: 98.6 (10-04-24 @ 16:20)  HR: 51 (10-04-24 @ 18:00) (48 - 59)  BP: 122/60 (10-04-24 @ 18:00) (95/55 - 138/77)  BP(mean): --  RR: 16 (10-04-24 @ 18:00) (16 - 20)  SpO2: 97% (10-04-24 @ 18:00) (96% - 98%)    PHYSICAL EXAM:    Constitutional: resting comfortably in bed; NAD  HEENT: NC/AT, PERRL, EOMI, anicteric sclera, no nasal discharge; uvula midline, no oropharyngeal erythema or exudates; dry mucous membranes. No jaundice  Neck: supple; no JVD or thyromegaly  Respiratory: unlabored breathing, CTA B/L; no W/Rhonchi/Crackles, no retractions or use of accessory muscles   Cardiac: +S1/S2; RRR; Holosystolic whooshing diastolic murmur. No ventricular heaves, PMI non-displaced  Gastrointestinal: soft, NT/ND; no rebound or guarding; +BSx4. Surgical scars present  Back: spine midline, no bony tenderness or step-offs; no CVAT B/L  Extremities: WWP, no clubbing or cyanosis; no peripheral edema  Musculoskeletal: NROM x4; no joint swelling, tenderness or erythema  Vascular: 2+ radial, DP/PT pulses B/L  Dermatologic: skin warm, dry and intact; no rashes, wounds, or scars. No petechiae, hematoma  Lymphatic: no submandibular or cervical LAD  Neurologic: AAOx3; CNII-XII grossly intact; no focal deficits  Psychiatric: affect and characteristics of appearance, verbalizations, behaviors are appropriate, denies SI/HI/AH/VH

## 2024-10-04 NOTE — HISTORY OF PRESENT ILLNESS
[FreeTextEntry1] : THIS IS A RECREATED NOTE AS ORIGINAL NOTE LOST   Mr. 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   He presented for  consultation due to erectile dysfunction; low testosterone and nocturia  This was complicated by hemorrhagic pancreatitis, pneumonia, sepsis, month and multiple surgical abdominal drainage procedures.  Since the time of his surgery he states that he has not been able to have erections.  He has not been able to achieve orgasm.  He states that he and his wife are sexually active.  His wife accompanied him at today's visit.  He also complains of nocturia.  He denies any significant diurnal frequency.  He notes some decrease in the strength of his urinary stream.  Denies any hematuria or dysuria  He was also diagnosed as having hypogonadism and was started on transdermal testosterone by his primary care physician.  He was on this for 2 months without any appreciable change.  He was unable to continue this due to insurance reasons.  It is not clear if testosterone levels were monitored on testosterone trans dermally to see if there was a therapeutic increase.  He states that he developed complete erectile dysfuncton after an aortic dissection repair in and pancreatitis. Previously, his erections were normal. He also complaints of slow urination and waking up multiple times at night.   No known drug allergies   Family history no  prostate, kidney, or bladder cancer.   He smokes marijuana daily but does not use any other drugs or tobacco.   He is currently not on testosterone replacement therapy due to insurance issues.   -   - Medications:     - Amlodipine     - Hydrochlorothiazide     - Aptiom     - Aspirin     - Lacosamide     - Baciparol     - Atorvastatin     - Multivitamins   - Allergies: None   10.3.2024 Patient returns regarding low testosterone and feeling of weakness

## 2024-10-04 NOTE — H&P ADULT - NSHPSOCIALHISTORY_GEN_ALL_CORE
Lives in a house with wife. Ambulates without assistance. Is able to make appointments. Eats low salt diet. Denies tobacco, alcohol use. Endorses marijuana use.

## 2024-10-04 NOTE — ED ADULT NURSE NOTE - NSFALLUNIVINTERV_ED_ALL_ED
Bed/Stretcher in lowest position, wheels locked, appropriate side rails in place/Call bell, personal items and telephone in reach/Instruct patient to call for assistance before getting out of bed/chair/stretcher/Non-slip footwear applied when patient is off stretcher/[place] to call system/Physically safe environment - no spills, clutter or unnecessary equipment/Purposeful proactive rounding/Room/bathroom lighting operational, light cord in reach

## 2024-10-04 NOTE — H&P ADULT - ATTENDING COMMENTS
53 yo M with PMHx aortic dissection (s/p EVAR), CAD (s/p CABG), hx DVT/PE (no longer on AC), HTN, HLD, seizure disorder px at urging of outpatient urologist due to concern for 3-4 month hx of generalized fatigue and low Hb on outpatient labs, found to have Hb 5.9 in ED, admitted for further evaluation and management of symptomatic anemia.     VSS. HR 40-50s. EKG reviewed (sinus bradycardia, ). Labs reviewed. Hb 5.9 (MCV 98.5). Moderate schistocytes on peripheral smear. Differential also notable for peripheral eosinophilia. PT/PTT/INR within normal limits. AlkPhos 123. AST 56. No imaging performed in ED. No signs/sx to suggest bleed at this time. No significant NSAID/etOH use. Denies hematemesis/melena/hematochezia. Has not yet completed age-appropriate cancer screening. No hx of prior pRBC transfusions (outside of venu-operatively). Suspect current chronic etiology of sx more likely i/s/o symptomatic anemia rather than symptomatic bradycardia.      [ ] Iron studies   [ ] Hemolysis labs (LDH, Haptoglobin, Reticulocyte)   [ ] 1U pRBC ordered in ED -> Post-transfusion CBC    [ ] Repeat 12-lead EKG in AM   [ ] Hold home Amlodipine/Bisoprolol    [ ] Hold chemical DVT ppx

## 2024-10-04 NOTE — H&P ADULT - PROBLEM SELECTOR PLAN 9
F: none  E: replete as needed  N: NPO for possible procedure  DVT ppx: SCD F: 1L LR  E: replete as needed  N: NPO for possible procedure  DVT ppx: SCD

## 2024-10-04 NOTE — H&P ADULT - NSHPLABSRESULTS_GEN_ALL_CORE
.  LABS:                         5.9    5.22  )-----------( 158      ( 04 Oct 2024 15:04 )             19.6     10-04    141  |  106  |  25[H]  ----------------------------<  128[H]  4.4   |  26  |  1.14    Ca    8.7      04 Oct 2024 15:04    TPro  6.8  /  Alb  3.9  /  TBili  0.6  /  DBili  x   /  AST  56[H]  /  ALT  31  /  AlkPhos  123[H]  10-04    PT/INR - ( 04 Oct 2024 15:04 )   PT: 12.2 sec;   INR: 1.04          PTT - ( 04 Oct 2024 15:04 )  PTT:34.7 sec  Urinalysis Basic - ( 04 Oct 2024 15:04 )    Color: x / Appearance: x / SG: x / pH: x  Gluc: 128 mg/dL / Ketone: x  / Bili: x / Urobili: x   Blood: x / Protein: x / Nitrite: x   Leuk Esterase: x / RBC: x / WBC x   Sq Epi: x / Non Sq Epi: x / Bacteria: x                RADIOLOGY, EKG & ADDITIONAL TESTS: Reviewed.

## 2024-10-04 NOTE — HISTORY OF PRESENT ILLNESS
[FreeTextEntry1] : THIS IS A RECREATED NOTE AS ORIGINAL NOTE LOST   Mr. 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   He presented for  consultation due to erectile dysfunction; low testosterone and nocturia  This was complicated by hemorrhagic pancreatitis, pneumonia, sepsis, month and multiple surgical abdominal drainage procedures.  Since the time of his surgery he states that he has not been able to have erections.  He has not been able to achieve orgasm.  He states that he and his wife are sexually active.  His wife accompanied him at today's visit.  He also complains of nocturia.  He denies any significant diurnal frequency.  He notes some decrease in the strength of his urinary stream.  Denies any hematuria or dysuria  He was also diagnosed as having hypogonadism and was started on transdermal testosterone by his primary care physician.  He was on this for 2 months without any appreciable change.  He was unable to continue this due to insurance reasons.  It is not clear if testosterone levels were monitored on testosterone trans dermally to see if there was a therapeutic increase.  He states that he developed complete erectile dysfuncton after an aortic dissection repair in and pancreatitis. Previously, his erections were normal. He also complaints of slow urination and waking up multiple times at night.   No known drug allergies   Family history no  prostate, kidney, or bladder cancer.   He smokes marijuana daily but does not use any other drugs or tobacco.   He is currently not on testosterone replacement therapy due to insurance issues.   -   - Medications:     - Amlodipine     - Hydrochlorothiazide     - Aptiom     - Aspirin     - Lacosamide     - Baciparol     - Atorvastatin     - Multivitamins   - Allergies: None   10.3.2024 Patient returns regarding low testosterone and feeling of weakness

## 2024-10-04 NOTE — ASSESSMENT
[FreeTextEntry1] :  Mr. Ovidio Villalobos  presents with erectile dysfunction. He isaccompanied by his wife.. HeIs a 58-year-old gentleman with a complex medical history which includes aortic aneurysm dissection complicated by hemorrhagic pancreatitis pneumonia sepsis and multiple surgical procedures who now presents with a complaint of erectile dysfunction.  He states his erectile dysfunction started immediately after his surgical recovery.  He states that he has a maintaining libido.  He and his wife are rarely sexually active.  He is unable to achieve an erection or orgasm.  He also presents with a history of having been diagnosed with low testosterone.  He was treated with transdermal testosterone for 2 months.  He was placing this in the evening rather than the morning.  He reports that there was no increase in the therapeutic levels although this is not documented in the chart.  He did not have any change in his erectile function.  He states that he is no longer able to get testosterone transdermally.  He does have bilateral testicular atrophy which is consistent with his diagnosis of hypogonadism.  He also complains of lower urinary tract symptoms.  His flow demonstrates a maximum flow of 11.9 cc/s.  He has a postvoid residual of 23 cc.  His primary complaint is nocturia.  We discussed the impact of his edema (peripheral edema) on nocturia and the management of this prior to adding any alpha blockers to his regimen.   An extensive discussion with this couple regarding the potential etiologies of his sexual dysfunction.  Certainly in light of his vascular disease and surgery 1 would expect a vasculogenic etiology.  However it is interesting that he has excellent pulses in the dorsalis pedis and posterior tibialis bilaterally.  We discussed that this does not mean that there is not vascular disease affecting his erections.  We discussed the need to proceed with penile duplex ultrasound with intracavernous injection for this diagnosis.  The patient is unwilling to proceed with this at this time as he is concerned regarding injections.  We discussed his testosterone levels.  We discussed the appropriate timing for the pplication of transdermal testosterone in the morning.  We discussed the utilization of IM testosterone cypionate as this would be a more affordable option.  We discussed the need for medical clearance in light of his cardiovascular disease prior to testosterone administration and/or phosphodiesterase administration.  We had an extensive discussion re Risks of T replacement; Patient was informed about Black box warning from FDA. We discussed recent data regarding increased risk of coronary plaque size, heart disease; thrombolic event; increased Hbg/Hct, breast tenderness; acne; irritability; sleep apnea and increased urinary symptoms; effect on testicular function including suppression of spermatogenesis; hair loss (male pattern baldness) effect on LFTS; effect on prostate cancer. Different treatment approaches were discussed including IM, transdermal and Testopel We discussed advantages and disadvantages of each We discussed risk to T exposure to partners and children from transdermal appoaches. Patient choses to proceed with transdermal T replacement  Discussed his lower urinary tract symptoms.  Although he may have some degree of prostatic obstruction he does have significant edema and his primary complaint is nocturia.  We therefore suggested follow-up by his cardiologist for fluid management prior to additional medications.  He discussed the leg/inguinal masses.  THey both note that they have been resolving but continue to be anxious We discussed continued followup  Lastly we discussed the various etiologies of his sexual dysfunction including depression and anxiety after severe medical illness.  His wife endorses there is some component of this in his behavior.  Plan: 1.  Morning testosterone 2.  Follow-up cardiology regarding clearance for testosterone and/or phosphodiesterase inhibitor and/or penile duplex ultrasound evaluation 3.  Fluid management re: cardiology  10.3.2024 patient returns complaining of fatigue discussed anemia  patient states that he was unaware of anemia patient was to see hematologist denies blood per rectum  denies hematuria   DIscussed  treatment options including PD 5-I, Intracavernosal therapy, erection vacuum device. Instructional materials provided to patient. not willing to pursue any of the options outlined and wants testosterone therapy.  We discussed his testosterone levels do not warrant treatment at this time and he has not had a evaluation by cardiology as recommended.  He states that he is not willing to proceed with evaluation by cardiology..  Plan: 1. cbc 2. repeat T in AM 3. cardiology  4. urinalysis  5. urine culture

## 2024-10-04 NOTE — H&P ADULT - PROBLEM SELECTOR PLAN 6
s/p IVC filter then removed. Treated with apixaban, now off AC. Only on aspirin.    Holding home ASA d/t concern for bleed

## 2024-10-04 NOTE — H&P ADULT - PROBLEM SELECTOR PLAN 1
Patient with history of anemia, presenting with 4 months of progressive weakness and lightheadedness. Was found to have Hb of 8 today at ouptpt clinic. Hb 5.9 on admission. Baseline Hb around 9-10 per chart review. MCV 98.5 Plt 158. Coags WNL  S/p 1 unit pRBC. No ssx acute bleed, GIB.  Recent dental surgery, started on clindamycin and ibuprofen 200 mg q6 for 4 days.  DDx: hypothyroid, hemolysis, drug related, B12, folate    Plan:  - Continue home pantoprazole 40 mg qd  - f/u TSH, retic, LDH, B12, folate,  - f/u post transfusion CBC  - daily H/H, maintain Hb>7  - NPO for possible intervention Patient with history of anemia, presenting with 4 months of progressive weakness and lightheadedness. Was found to have Hb of 8 today at ouptpt clinic. Hb 5.9 on admission. Baseline Hb around 9-10 per chart review. MCV 98.5 Plt 158. Coags WNL  S/p 1 unit pRBC. No ssx acute bleed, GIB.  Recent dental surgery, started on clindamycin and ibuprofen 200 mg q6 for 4 days.  DDx: hypothyroid, hemolysis, drug related, B12, folate, r/o acute occult bleed, shearing/MAHA    Plan:  - Continue home pantoprazole 40 mg qd  - retroperitoneal US  - f/u TSH, retic, LDH, B12, folate, iron, ferritin  - f/u post transfusion CBC  - daily H/H, maintain Hb>7  - NPO for possible intervention Patient with history of anemia, presenting with 4 months of progressive weakness and lightheadedness. Was found to have Hb of 8 today at ouptpt clinic. Hb 5.9 on admission. Baseline Hb around 9-10 per chart review. MCV 98.5 Plt 158. Schistocytes on peripheral smear. Coags WNL. Patient is hemodynamically stable  S/p 1 unit pRBC. No ssx acute bleed, GIB.  Recent dental surgery, started on clindamycin and ibuprofen 200 mg q6 for 4 days.  DDx: hypothyroid, hemolysis, drug related, B12, folate, acute occult bleed, shearing/MAHA    Plan:  - Continue home pantoprazole 40 mg qd  - f/u TSH, retic, LDH, B12, folate, iron, ferritin  - f/u post transfusion CBC  - daily H/H, maintain Hb>7  - NPO@MN for possible intervention

## 2024-10-04 NOTE — ED PROVIDER NOTE - PHYSICAL EXAMINATION
CONSTITUTIONAL: Awake, alert.  Nontoxic, no acute distress.    HEAD: Normocephalic, atraumatic.    EYES: Conjunctivae pale. Sclera is non-icteric.    ENT: Normal appearing external ears, nose, mucous membranes moist.    NECK: supple, trachea midline.    HEART:  Normal rate, regular rhythm.  Heart sounds S1, S2.  No murmurs, rubs or gallops.    LUNGS:  No acute respiratory distress.  Non-tachypneic and non-labored.  Lungs are clear bilaterally with good aeration.  No wheezing, rales, rhonchi.    ABDOMEN: Normal appearing skin without lesions, rashes.  Normal bowel sounds x 4.  Soft, non-distended, non-tender in all four quadrants. No rebound or guarding. No hernias or masses palpable.  No pulsatile abdominal mass.   No CVA tenderness b/l.    MUSCULOSKELETAL:  Moving all extremities without issue.    SKIN: Skin in warm, dry and intact without rashes or lesions.  Appropriate color for ethnicity.    NEUROLOGICAL:  Patient is alert, oriented x person, place and time.    PSYCH: Appropriate mood and affect. Good judgment and insight.

## 2024-10-04 NOTE — H&P ADULT - PROBLEM SELECTOR PLAN 3
History of epilepsy. Admitted for management of seizures Feb 2024. Follows outpt neurologist Dr Delilah Hernandez office ( [phone])    Cw home lacosamide 200 BID

## 2024-10-04 NOTE — H&P ADULT - ASSESSMENT
Patient is a 54 year old male with PMHx of aortic dissection (status post aortic bifurcation bypass graft disease, EVAR), CAD (status post CABG 5/2023), DVT lower extremities, hypertension, pulmonary embolism, keratitis (status postsurgery), seizure disorder, presents with 4 months lightheadedness and weakness, admitted for symptomatic anemia. Patient is a 54 year old male with PMHx of pancreatitis, aortic dissection (status post aortic bifurcation bypass graft disease, EVAR), CAD (status post CABG 5/2023), DVT lower extremities, hypertension, pulmonary embolism, keratitis (status postsurgery), seizure disorder, presents with 4 months lightheadedness and weakness, admitted for symptomatic anemia.

## 2024-10-04 NOTE — ED PROVIDER NOTE - ATTENDING APP SHARED VISIT CONTRIBUTION OF CARE
Pt is a 53yo m, h/o aortic dissection w/ evar, cad, dvt/ pe no longer on ac, sz, htn, who was referred to ed for hg 8 on outpt labs. + weakness/ lightheadedness x several months, unchanged. + h/o progressive anemia, s/p prbc in May '23. Has not seen a hematologist, gi for further w/u for anemia. No h/o bleeding, melena, bloody stools, hematemesis... No cp, sob, palp, syncope... Afebrile. HDS. PE as above. + bradycardic, reg. Lungs cta b/l. Abd soft, nt/nd, no g/r. No peripheral edema. Pulses intact and equal b/l. EKG as noted above. Plan for labs to assess hg level, trop, rpt ekg to assess for dynamic changes. Will continue to monitor.

## 2024-10-05 LAB
ADD ON TEST-SPECIMEN IN LAB: SIGNIFICANT CHANGE UP
ADD ON TEST-SPECIMEN IN LAB: SIGNIFICANT CHANGE UP
ALBUMIN SERPL ELPH-MCNC: 3.4 G/DL — SIGNIFICANT CHANGE UP (ref 3.3–5)
ALP SERPL-CCNC: 115 U/L — SIGNIFICANT CHANGE UP (ref 40–120)
ALT FLD-CCNC: 29 U/L — SIGNIFICANT CHANGE UP (ref 10–45)
ANION GAP SERPL CALC-SCNC: 7 MMOL/L — SIGNIFICANT CHANGE UP (ref 5–17)
ANISOCYTOSIS BLD QL: SIGNIFICANT CHANGE UP
APPEARANCE UR: CLEAR — SIGNIFICANT CHANGE UP
AST SERPL-CCNC: 53 U/L — HIGH (ref 10–40)
BACTERIA # UR AUTO: NEGATIVE /HPF — SIGNIFICANT CHANGE UP
BASOPHILS # BLD AUTO: 0 K/UL — SIGNIFICANT CHANGE UP (ref 0–0.2)
BASOPHILS NFR BLD AUTO: 0 % — SIGNIFICANT CHANGE UP (ref 0–2)
BILIRUB SERPL-MCNC: 0.6 MG/DL — SIGNIFICANT CHANGE UP (ref 0.2–1.2)
BILIRUB UR-MCNC: NEGATIVE — SIGNIFICANT CHANGE UP
BUN SERPL-MCNC: 22 MG/DL — SIGNIFICANT CHANGE UP (ref 7–23)
CALCIUM SERPL-MCNC: 8.6 MG/DL — SIGNIFICANT CHANGE UP (ref 8.4–10.5)
CAST: 0 /LPF — SIGNIFICANT CHANGE UP (ref 0–4)
CHLORIDE SERPL-SCNC: 104 MMOL/L — SIGNIFICANT CHANGE UP (ref 96–108)
CO2 SERPL-SCNC: 25 MMOL/L — SIGNIFICANT CHANGE UP (ref 22–31)
COLOR SPEC: SIGNIFICANT CHANGE UP
CREAT SERPL-MCNC: 1 MG/DL — SIGNIFICANT CHANGE UP (ref 0.5–1.3)
DIFF PNL FLD: ABNORMAL
EGFR: 89 ML/MIN/1.73M2 — SIGNIFICANT CHANGE UP
EOSINOPHIL # BLD AUTO: 0.19 K/UL — SIGNIFICANT CHANGE UP (ref 0–0.5)
EOSINOPHIL NFR BLD AUTO: 2.7 % — SIGNIFICANT CHANGE UP (ref 0–6)
FOLATE SERPL-MCNC: 9.3 NG/ML — SIGNIFICANT CHANGE UP
GIANT PLATELETS BLD QL SMEAR: PRESENT — SIGNIFICANT CHANGE UP
GLUCOSE SERPL-MCNC: 84 MG/DL — SIGNIFICANT CHANGE UP (ref 70–99)
GLUCOSE UR QL: NEGATIVE MG/DL — SIGNIFICANT CHANGE UP
HAPTOGLOB SERPL-MCNC: <10 MG/DL — LOW (ref 34–200)
HCT VFR BLD CALC: 28.9 % — LOW (ref 39–50)
HGB BLD-MCNC: 8.7 G/DL — LOW (ref 13–17)
KETONES UR-MCNC: NEGATIVE MG/DL — SIGNIFICANT CHANGE UP
LDH SERPL L TO P-CCNC: 1039 U/L — HIGH (ref 50–242)
LEUKOCYTE ESTERASE UR-ACNC: ABNORMAL
LYMPHOCYTES # BLD AUTO: 1.14 K/UL — SIGNIFICANT CHANGE UP (ref 1–3.3)
LYMPHOCYTES # BLD AUTO: 16.1 % — SIGNIFICANT CHANGE UP (ref 13–44)
MACROCYTES BLD QL: SIGNIFICANT CHANGE UP
MAGNESIUM SERPL-MCNC: 2 MG/DL — SIGNIFICANT CHANGE UP (ref 1.6–2.6)
MANUAL SMEAR VERIFICATION: SIGNIFICANT CHANGE UP
MCHC RBC-ENTMCNC: 29.5 PG — SIGNIFICANT CHANGE UP (ref 27–34)
MCHC RBC-ENTMCNC: 30.1 GM/DL — LOW (ref 32–36)
MCV RBC AUTO: 98 FL — SIGNIFICANT CHANGE UP (ref 80–100)
MICROCYTES BLD QL: SLIGHT — SIGNIFICANT CHANGE UP
MONOCYTES # BLD AUTO: 0.63 K/UL — SIGNIFICANT CHANGE UP (ref 0–0.9)
MONOCYTES NFR BLD AUTO: 8.9 % — SIGNIFICANT CHANGE UP (ref 2–14)
NEUTROPHILS # BLD AUTO: 5.12 K/UL — SIGNIFICANT CHANGE UP (ref 1.8–7.4)
NEUTROPHILS NFR BLD AUTO: 72.3 % — SIGNIFICANT CHANGE UP (ref 43–77)
NITRITE UR-MCNC: NEGATIVE — SIGNIFICANT CHANGE UP
OVALOCYTES BLD QL SMEAR: SLIGHT — SIGNIFICANT CHANGE UP
PH UR: 6 — SIGNIFICANT CHANGE UP (ref 5–8)
PHOSPHATE SERPL-MCNC: 3.4 MG/DL — SIGNIFICANT CHANGE UP (ref 2.5–4.5)
PLAT MORPH BLD: ABNORMAL
PLATELET # BLD AUTO: 176 K/UL — SIGNIFICANT CHANGE UP (ref 150–400)
POIKILOCYTOSIS BLD QL AUTO: SIGNIFICANT CHANGE UP
POLYCHROMASIA BLD QL SMEAR: SLIGHT — SIGNIFICANT CHANGE UP
POTASSIUM SERPL-MCNC: 4.2 MMOL/L — SIGNIFICANT CHANGE UP (ref 3.5–5.3)
POTASSIUM SERPL-SCNC: 4.2 MMOL/L — SIGNIFICANT CHANGE UP (ref 3.5–5.3)
PROT SERPL-MCNC: 6.2 G/DL — SIGNIFICANT CHANGE UP (ref 6–8.3)
PROT UR-MCNC: 30 MG/DL
RBC # BLD: 2.95 M/UL — LOW (ref 4.2–5.8)
RBC # BLD: 2.95 M/UL — LOW (ref 4.2–5.8)
RBC # FLD: 24.8 % — HIGH (ref 10.3–14.5)
RBC BLD AUTO: ABNORMAL
RBC CASTS # UR COMP ASSIST: 12 /HPF — HIGH (ref 0–4)
RETICS #: 163.4 K/UL — HIGH (ref 25–125)
RETICS/RBC NFR: 5.4 % — HIGH (ref 0.5–2.5)
SCHISTOCYTES BLD QL AUTO: SIGNIFICANT CHANGE UP
SODIUM SERPL-SCNC: 136 MMOL/L — SIGNIFICANT CHANGE UP (ref 135–145)
SP GR SPEC: 1.02 — SIGNIFICANT CHANGE UP (ref 1–1.03)
SPHEROCYTES BLD QL SMEAR: SLIGHT — SIGNIFICANT CHANGE UP
SQUAMOUS # UR AUTO: 1 /HPF — SIGNIFICANT CHANGE UP (ref 0–5)
T4 AB SER-ACNC: 5.37 UG/DL — SIGNIFICANT CHANGE UP (ref 4.5–11.7)
TSH SERPL-MCNC: 1.37 UIU/ML — SIGNIFICANT CHANGE UP (ref 0.27–4.2)
UROBILINOGEN FLD QL: 1 MG/DL — SIGNIFICANT CHANGE UP (ref 0.2–1)
VIT B12 SERPL-MCNC: >2000 PG/ML — HIGH (ref 232–1245)
WBC # BLD: 7.08 K/UL — SIGNIFICANT CHANGE UP (ref 3.8–10.5)
WBC # FLD AUTO: 7.08 K/UL — SIGNIFICANT CHANGE UP (ref 3.8–10.5)
WBC UR QL: 0 /HPF — SIGNIFICANT CHANGE UP (ref 0–5)

## 2024-10-05 PROCEDURE — 99233 SBSQ HOSP IP/OBS HIGH 50: CPT

## 2024-10-05 RX ORDER — AMLODIPINE BESYLATE 5 MG
10 TABLET ORAL ONCE
Refills: 0 | Status: COMPLETED | OUTPATIENT
Start: 2024-10-05 | End: 2024-10-05

## 2024-10-05 RX ORDER — AMLODIPINE BESYLATE 5 MG
10 TABLET ORAL EVERY 24 HOURS
Refills: 0 | Status: DISCONTINUED | OUTPATIENT
Start: 2024-10-06 | End: 2024-10-06

## 2024-10-05 RX ORDER — ASPIRIN 325 MG
81 TABLET ORAL DAILY
Refills: 0 | Status: DISCONTINUED | OUTPATIENT
Start: 2024-10-05 | End: 2024-10-08

## 2024-10-05 RX ORDER — LACOSAMIDE 10 MG/ML
200 SOLUTION ORAL EVERY 12 HOURS
Refills: 0 | Status: DISCONTINUED | OUTPATIENT
Start: 2024-10-06 | End: 2024-10-06

## 2024-10-05 RX ORDER — LACOSAMIDE 10 MG/ML
200 SOLUTION ORAL ONCE
Refills: 0 | Status: DISCONTINUED | OUTPATIENT
Start: 2024-10-05 | End: 2024-10-05

## 2024-10-05 RX ADMIN — CLINDAMYCIN PHOSPHATE 300 MILLIGRAM(S): 150 INJECTION, SOLUTION INTRAVENOUS at 18:06

## 2024-10-05 RX ADMIN — PANTOPRAZOLE SODIUM 40 MILLIGRAM(S): 40 TABLET, DELAYED RELEASE ORAL at 09:12

## 2024-10-05 RX ADMIN — MULTI VITAMIN/MINERAL SUPPLEMENT WITH ASCORBIC ACID, NIACIN, PYRIDOXINE, PANTOTHENIC ACID, FOLIC ACID, RIBOFLAVIN, THIAMIN, BIOTIN, COBALAMIN AND ZINC. 1 TABLET(S): 60; 20; 12.5; 10; 10; 1.7; 1.5; 1; .3; .006 TABLET, COATED ORAL at 12:05

## 2024-10-05 RX ADMIN — Medication 650 MILLIGRAM(S): at 13:31

## 2024-10-05 RX ADMIN — Medication 650 MILLIGRAM(S): at 00:29

## 2024-10-05 RX ADMIN — CLINDAMYCIN PHOSPHATE 300 MILLIGRAM(S): 150 INJECTION, SOLUTION INTRAVENOUS at 12:05

## 2024-10-05 RX ADMIN — ATORVASTATIN CALCIUM 20 MILLIGRAM(S): 10 TABLET, FILM COATED ORAL at 21:03

## 2024-10-05 RX ADMIN — LACOSAMIDE 200 MILLIGRAM(S): 10 SOLUTION ORAL at 20:05

## 2024-10-05 RX ADMIN — Medication 81 MILLIGRAM(S): at 12:05

## 2024-10-05 RX ADMIN — LACOSAMIDE 200 MILLIGRAM(S): 10 SOLUTION ORAL at 06:32

## 2024-10-05 RX ADMIN — Medication 650 MILLIGRAM(S): at 23:51

## 2024-10-05 RX ADMIN — CLINDAMYCIN PHOSPHATE 300 MILLIGRAM(S): 150 INJECTION, SOLUTION INTRAVENOUS at 06:31

## 2024-10-05 RX ADMIN — Medication 650 MILLIGRAM(S): at 22:51

## 2024-10-05 RX ADMIN — ESLICARBAZEPINE ACETATE 800 MILLIGRAM(S): 600 TABLET ORAL at 20:11

## 2024-10-05 RX ADMIN — Medication 650 MILLIGRAM(S): at 12:31

## 2024-10-05 RX ADMIN — Medication 10 MILLIGRAM(S): at 12:32

## 2024-10-05 NOTE — DIETITIAN INITIAL EVALUATION ADULT - NUTRITIONGOAL OUTCOME1
Pt will meet 75% or more of protein/energy needs via most appropriate route for nutrition.   Pt will remain within +/-10% of current body weight while inpatient.

## 2024-10-05 NOTE — PROGRESS NOTE ADULT - PROBLEM SELECTOR PLAN 4
CAD (status post CABG 5/2023). Takes Aspirin 81 mg at home  - resume ASA 81 given low concern for bleed

## 2024-10-05 NOTE — PROGRESS NOTE ADULT - PROBLEM SELECTOR PLAN 3
History of epilepsy. Admitted for management of seizures Feb 2024. Follows outpt neurologist Dr Delilah Hernandez office ( [phone]). Had short several second episode of blank staring on 10/5.  - vEEG  - Neuro consult  -  home lacosamide 200 q12h

## 2024-10-05 NOTE — DIETITIAN INITIAL EVALUATION ADULT - PERTINENT LABORATORY DATA
10-05    136  |  104  |  22  ----------------------------<  84  4.2   |  25  |  1.00    Ca    8.6      05 Oct 2024 05:30  Phos  3.4     10-05  Mg     2.0     10-05    TPro  6.2  /  Alb  3.4  /  TBili  0.6  /  DBili  x   /  AST  53[H]  /  ALT  29  /  AlkPhos  115  10-05

## 2024-10-05 NOTE — PROGRESS NOTE ADULT - PROBLEM SELECTOR PLAN 2
- resume home amlodipine  - Hold Bisoprolol 10 mg (formulary interchange metoprolol xr 200) for bradycardia

## 2024-10-05 NOTE — PROGRESS NOTE ADULT - PROBLEM SELECTOR PLAN 1
Patient with history of anemia, presenting with 4 months of progressive weakness and lightheadedness. Was found to have Hb of 8 today at ouptpt clinic. Hb 5.9 on admission. Baseline Hb around 9-10 per chart review. MCV 98.5 Plt 158. Schistocytes on peripheral smear and labs suggestive of hemolysis likely intravascular shearing.    Plan:  - Continue home pantoprazole 40 mg qd  - daily H/H, maintain Hb>8 iso CAD  - resume diet, no planned intervention  - monitor hemodynamics and for bleeding  - CTS consulted for concern of valvular dysfunction leading to shearing  - TTE pending  - f/u [facility]  - Heme c/s for evaluation of other causes of hemolysis pending TTE and [facility]

## 2024-10-05 NOTE — DIETITIAN INITIAL EVALUATION ADULT - PERTINENT MEDS FT
MEDICATIONS  (STANDING):  aspirin  chewable 81 milliGRAM(s) Oral daily  atorvastatin 20 milliGRAM(s) Oral at bedtime  clindamycin   Capsule 300 milliGRAM(s) Oral every 6 hours  eslicarbazepine 800 milliGRAM(s) Oral at bedtime  lacosamide 200 milliGRAM(s) Oral once  multivitamin 1 Tablet(s) Oral daily  pantoprazole    Tablet 40 milliGRAM(s) Oral before breakfast    MEDICATIONS  (PRN):  acetaminophen     Tablet .. 650 milliGRAM(s) Oral every 6 hours PRN Temp greater or equal to 38C (100.4F), Mild Pain (1 - 3)  aluminum hydroxide/magnesium hydroxide/simethicone Suspension 30 milliLiter(s) Oral every 4 hours PRN Dyspepsia  melatonin 3 milliGRAM(s) Oral at bedtime PRN Insomnia  ondansetron Injectable 4 milliGRAM(s) IV Push every 8 hours PRN Nausea and/or Vomiting

## 2024-10-05 NOTE — PROGRESS NOTE ADULT - PROBLEM SELECTOR PROBLEM 6
Received bedside report from night shift RN.   Assumed care of patient at change of shift.   Assessment complete and POC discussed.   STATUS: Patient is A&Ox2-3, BP elevated (MD aware), on 2L O2.   PAIN: Patient reports dull headache. Denies need for pain medication at this time.   Bed alarm set, call light in reach.  Bed is in lowest/locked position.   Call light and belongings are within reach.   No further needs at this time.     History of DVT in adulthood

## 2024-10-05 NOTE — DIETITIAN INITIAL EVALUATION ADULT - PROBLEM SELECTOR PLAN 2
Hold Amlodipine 10 mg and     Hold Bisoprolol 10 mg (formulary interchange metoprolol xr 200) for bradycardia

## 2024-10-05 NOTE — CONSULT NOTE ADULT - ASSESSMENT
54y Male with PMHx of hemorrhagic pancreatitis (followed by Dr. Solano), Type A aortic dissection s/p Dacron Grafting and AV resuspension (2013) and total arch replacement, AVR (23 mm), CABG x 1 (SVG-RCA) and aorta-axillary bypass graft (2023), PE (s/p IVC now removed 9/2024), HTN, seizure disorder who presented to [facility] on 10/5 after outpatient labs showed hemoglobin of 8, with repeat in ED showing 5.9. Patine reports he has been feeling weak and lightheaded for about four months. He was given 1U of PRBC and admitted to medicine service for management/work-up of anemia. Labs showing schistocytes and elevated LDH with concerns for hemolytic anemia. CTS was consulted given concern for bioprosthetic valve causing hemolytic anemia.      Plan     Problem 1: Concern for AV Dysfunction   - please obtain echocardiogram to assess bioprosthetic AV   - most recent echo in June 2024 showed well seated valve with normal function     Problem 2:  Anemia   - work-up currently consistent with hemolytic anemia   - received 1U PRBC in ER with good response (5.9 to 8.7)   - continue work-up per medicine     Problem 3: Type A Dissection   - recommending normotension   - BP management per cardiology and meiNDine     Problem 4: Seizure Disorder  - continue anti-seizure medication   - remainder of care per primary team

## 2024-10-05 NOTE — DIETITIAN INITIAL EVALUATION ADULT - PROBLEM SELECTOR PLAN 3
History of epilepsy. Admitted for management of seizures Feb 2024. Follows outpt neurologist Dr Delilah Hernandez office ( [phone])    Cw home lacosamide 200 BID

## 2024-10-05 NOTE — DIETITIAN INITIAL EVALUATION ADULT - ADD RECOMMEND
-Continue current diet   -Add Ensure Plus High Protein BID   -Encourage good PO intake   -Honor food preferences as able  -Weekly wts  -Monitor chemistry, GI function, and skin integrity

## 2024-10-05 NOTE — DIETITIAN INITIAL EVALUATION ADULT - SIGNS/SYMPTOMS
[facility]  Juan Ortez   Home Medication Instructions PHOEBE:[id number]    Prenatal Vit-Fe Fumarate-FA (PRENATAL 
as evidenced by reported difference of 23 pounds from previous usual body weight.

## 2024-10-05 NOTE — DIETITIAN INITIAL EVALUATION ADULT - OTHER INFO
54 year old male with PMHx of pancreatitis, aortic dissection (status post aortic bifurcation bypass graft disease, EVAR), CAD (status post CABG 5/2023), DVT lower extremities, hypertension, pulmonary embolism, keratitis (status postsurgery), seizure disorder, presents with 4 months lightheadedness and weakness, admitted for symptomatic anemia.    Pt assessed in room on 7UR. Rx and labs reviewed. Pt presents with no overt signs of nutritional wasting. Pt received sitting on edge of bed, alert and participatory in nutrition assessment. Pt reports a fair appetite and reflective PO intake. Notes a usual body weight of 177 pounds; current body weight of 177 pounds. Pt states that prior to surgery, weight was around 200 pounds; family at bedside notes pt with larger lean muscle mass prior to surgery but has since been stable ~175 pounds recently. Interested in oral nutrition supplement; recommend Ensure Plus High Protein BID (pt prefers chocolate). No complaints of nausea/vomiting/constipation/diarrhea or difficult chew/swallow. NKA to food. RDN to remain available, see recommendations below.     Pain: 0 per chart review   GI: Abdomen non-distended/non-tender, +BS x4, last bowel movement PTA   Skin: Warm/Dry/Intact, no edema assessed

## 2024-10-05 NOTE — DIETITIAN INITIAL EVALUATION ADULT - PROBLEM SELECTOR PLAN 1
Patient with history of anemia, presenting with 4 months of progressive weakness and lightheadedness. Was found to have Hb of 8 today at ouptpt clinic. Hb 5.9 on admission. Baseline Hb around 9-10 per chart review. MCV 98.5 Plt 158. Schistocytes on peripheral smear. Coags WNL. Patient is hemodynamically stable  S/p 1 unit pRBC. No ssx acute bleed, GIB.  Recent dental surgery, started on clindamycin and ibuprofen 200 mg q6 for 4 days.  DDx: hypothyroid, hemolysis, drug related, B12, folate, acute occult bleed, shearing/MAHA    Plan:  - Continue home pantoprazole 40 mg qd  - f/u TSH, retic, LDH, B12, folate, iron, ferritin  - f/u post transfusion CBC  - daily H/H, maintain Hb>7  - NPO@MN for possible intervention

## 2024-10-05 NOTE — PROGRESS NOTE ADULT - SUBJECTIVE AND OBJECTIVE BOX
Patient is a 54y old  Male who presents with a chief complaint of ANEMIA     (05 Oct 2024 14:08)    INTERVAL EVENTS:  - received 1u pRBC overnight for Hgb 5.9 with response to 8.7  - labs c/w hemolysis  - US without DVT in LLE    SUBJECTIVE:  - pt reports subjective improvement in fatigue  - no chest pain, n/v, no melena, hematochezia, no abd pain  - did have bilateral LE months ago that improved with compression stockings  - per wife did have a 2 second episode of blank staring    Diet, DASH/TLC:   Sodium & Cholesterol Restricted (10-04-24 @ 21:07) [Active]      MEDICATIONS:  MEDICATIONS  (STANDING):  aspirin  chewable 81 milliGRAM(s) Oral daily  atorvastatin 20 milliGRAM(s) Oral at bedtime  clindamycin   Capsule 300 milliGRAM(s) Oral every 6 hours  eslicarbazepine 800 milliGRAM(s) Oral at bedtime  lacosamide 200 milliGRAM(s) Oral once  multivitamin 1 Tablet(s) Oral daily  pantoprazole    Tablet 40 milliGRAM(s) Oral before breakfast    MEDICATIONS  (PRN):  acetaminophen     Tablet .. 650 milliGRAM(s) Oral every 6 hours PRN Temp greater or equal to 38C (100.4F), Mild Pain (1 - 3)  aluminum hydroxide/magnesium hydroxide/simethicone Suspension 30 milliLiter(s) Oral every 4 hours PRN Dyspepsia  melatonin 3 milliGRAM(s) Oral at bedtime PRN Insomnia  ondansetron Injectable 4 milliGRAM(s) IV Push every 8 hours PRN Nausea and/or Vomiting      Allergies    No Known Allergies    Intolerances        OBJECTIVE:  Vital Signs Last 24 Hrs  T(C): 37.3 (05 Oct 2024 12:23), Max: 37.3 (05 Oct 2024 12:23)  T(F): 99.1 (05 Oct 2024 12:23), Max: 99.1 (05 Oct 2024 12:23)  HR: 52 (05 Oct 2024 12:23) (48 - 85)  BP: 168/79 (05 Oct 2024 12:23) (95/55 - 168/79)  BP(mean): --  RR: 18 (05 Oct 2024 12:23) (16 - 18)  SpO2: 99% (05 Oct 2024 12:23) (97% - 100%)    Parameters below as of 05 Oct 2024 12:23  Patient On (Oxygen Delivery Method): room air      I&O's Summary      PHYSICAL EXAM:  Gen: sitting up in bed at time of exam, INAD, well-appearing  HEENT: NCAT, MMM, clear OP  Neck: supple, trachea at midline  CV: RRR, +S1/S2  Pulm: adequate respiratory effort, no increase in work of breathing  Abd: soft, ND  Skin: warm and dry,   Ext: medial upper L thigh mass (per pt is chronic x months, smaller in size), 1+ pitting edema bilaterally  Neuro: AOx3, speaking in full sentences, no focal deficits  Psych: affect and behavior appropriate, pleasant at time of interview    LABS:                        8.7    7.08  )-----------( 176      ( 05 Oct 2024 05:30 )             28.9     10-05    136  |  104  |  22  ----------------------------<  84  4.2   |  25  |  1.00    Ca    8.6      05 Oct 2024 05:30  Phos  3.4     10-05  Mg     2.0     10-05    TPro  6.2  /  Alb  3.4  /  TBili  0.6  /  DBili  x   /  AST  53[H]  /  ALT  29  /  AlkPhos  115  10-05    LIVER FUNCTIONS - ( 05 Oct 2024 05:30 )  Alb: 3.4 g/dL / Pro: 6.2 g/dL / ALK PHOS: 115 U/L / ALT: 29 U/L / AST: 53 U/L / GGT: x           PT/INR - ( 04 Oct 2024 15:04 )   PT: 12.2 sec;   INR: 1.04          PTT - ( 04 Oct 2024 15:04 )  PTT:34.7 sec  CAPILLARY BLOOD GLUCOSE        Urinalysis Basic - ( 05 Oct 2024 05:30 )    Color: x / Appearance: x / SG: x / pH: x  Gluc: 84 mg/dL / Ketone: x  / Bili: x / Urobili: x   Blood: x / Protein: x / Nitrite: x   Leuk Esterase: x / RBC: x / WBC x   Sq Epi: x / Non Sq Epi: x / Bacteria: x        MICRODATA:      RADIOLOGY/OTHER STUDIES:

## 2024-10-05 NOTE — PROGRESS NOTE ADULT - ASSESSMENT
Patient is a 54 year old male with PMHx of hemorrhagic pancreatitis, type A aortic dissection s/p Dacron grafting and AV resuspension (2013) and total arch replacement, AVR, CAD (status post CABG 5/2023), DVT lower extremities, hypertension, pulmonary embolism, keratitis (status postsurgery), seizure disorder, presents with 4 months lightheadedness and weakness, admitted for symptomatic anemia.

## 2024-10-05 NOTE — CONSULT NOTE ADULT - SUBJECTIVE AND OBJECTIVE BOX
Surgeon: Dr. Pierre    Requesting Physician: Dr. Michael    HISTORY OF PRESENT ILLNESS:  54y Male with PMHx of hemorrhagic pancreatitis (followed by Dr. Solano), Type A aortic dissection s/p Dacron Grafting and AV resuspension (2013) and total arch replacement, AVR (23 mm), CABG x 1 (SVG-RCA) and aorta-axillary bypass graft (2023), PE (s/p IVC now removed 9/2024), HTN, seizure disorder who presented to [facility] on 10/5 after outpatient labs showed hemoglobin of 8, with repeat in ED showing 5.9. Patine reports he has been feeling weak and lightheaded for about four months. He was given 1U of PRBC and admitted to medicine service for management/work-up of anemia. Labs showing schistocytes and elevated LDH with concerns for hemolytic anemia. CTS was consulted given concern for bioprosthetic valve causing hemolytic anemia.     Patient most recently seen by Dr. Pierre in 5/2024 with repeat imaging showing stability of repair. Most recent echo in June 2024 showed well seated valve with normal function.     PAST MEDICAL & SURGICAL HISTORY:  HTN (hypertension)      Aortic dissection      CAD (coronary artery disease)      Seizure disorder      Seizure disorder      Hypertension      Status post endovascular aneurysm repair (EVAR)      Pancreatitis  hospitalized for 5 months per spouse      DVT, lower extremity      Pulmonary embolism      S/P aortic bifurcation bypass graft      S/P CABG x 1      Elective surgery  IVC filter          MEDICATIONS  (STANDING):  aspirin  chewable 81 milliGRAM(s) Oral daily  atorvastatin 20 milliGRAM(s) Oral at bedtime  clindamycin   Capsule 300 milliGRAM(s) Oral every 6 hours  eslicarbazepine 800 milliGRAM(s) Oral at bedtime  lacosamide 200 milliGRAM(s) Oral two times a day  multivitamin 1 Tablet(s) Oral daily  pantoprazole    Tablet 40 milliGRAM(s) Oral before breakfast    MEDICATIONS  (PRN):  acetaminophen     Tablet .. 650 milliGRAM(s) Oral every 6 hours PRN Temp greater or equal to 38C (100.4F), Mild Pain (1 - 3)  aluminum hydroxide/magnesium hydroxide/simethicone Suspension 30 milliLiter(s) Oral every 4 hours PRN Dyspepsia  melatonin 3 milliGRAM(s) Oral at bedtime PRN Insomnia  ondansetron Injectable 4 milliGRAM(s) IV Push every 8 hours PRN Nausea and/or Vomiting      Allergies    No Known Allergies    Intolerances        SOCIAL HISTORY:  Smoker:  YES / NO        PACK YEARS:                         WHEN QUIT?  ETOH use:  YES / NO               FREQUENCY / QUANTITY:  Ilicit Drug use:  YES / NO  Occupation:  Assisted device use (Cane / Walker):  Live with:    FAMILY HISTORY:  No pertinent family history in first degree relatives        Review of Systems (Need 10):  CONSTITUTIONAL: Denies fevers / chills, sweats, fatigue, weight loss, weight gain                                       NEURO:  Denies parathesias, seizures, syncope, confusion                                                                                  EYES:  Denies blurry vision, discharge, pain, loss of vision                                                                                    ENMT:  Denies difficulty hearing, vertigo, dysphagia, epistaxis, recent dental work                                       CV:  Denies chest pain, palpitations, TAYLOR, orthopnea                                                                                           RESPIRATORY:  Denies wWheezing, SOB, cough / sputum, hemoptysis                                                               GI:  Denies nausea, vomiting, diarrhea, constipation, melena                                                                          : Denies hematuria, dysuria, urgency, incontinence                                                                                          MUSKULOSKELETAL:  Denies arthritis, joint swelling, muscle weakness                                                             SKIN/BREAST:  Denies rash, itching, hair loss, masses                                                                                              PSYCH:  Denies depression, anxiety, suicidal ideation                                                                                                HEME/LYMPH:  Denies bruises easily, enlarged lymph nodes, tender lymph nodes                                          ENDOCRINE:  Denies cold intolerance, heat intolerance, polydipsia                                                                      Vital Signs Last 24 Hrs  T(C): 37.2 (05 Oct 2024 06:11), Max: 37.2 (05 Oct 2024 06:11)  T(F): 98.9 (05 Oct 2024 06:11), Max: 98.9 (05 Oct 2024 06:11)  HR: 58 (05 Oct 2024 06:11) (48 - 85)  BP: 130/59 (05 Oct 2024 06:11) (95/55 - 145/82)  BP(mean): --  RR: 18 (05 Oct 2024 06:11) (16 - 20)  SpO2: 100% (05 Oct 2024 06:11) (96% - 100%)    Parameters below as of 05 Oct 2024 06:11  Patient On (Oxygen Delivery Method): room air        Physical Exam (Need 8)  CONSTITUTIONAL:                                                                          WNL  NEURO:                                                                                             WNL                      EYES:                                                                                                  WNL  ENMT:                                                                                                WNL  CV:                                                                                                      WNL  RESPIRATORY:                                                                                  WNL  GI:                                                                                                       WNL  : JONES + / -                                                                                 WNL  MUSKULOSKELETAL:                                                                       WNL  SKIN / BREAST:                                                                                 WNL                                                          LABS:                        8.7    7.08  )-----------( 176      ( 05 Oct 2024 05:30 )             28.9     10-05    136  |  104  |  22  ----------------------------<  84  4.2   |  25  |  1.00    Ca    8.6      05 Oct 2024 05:30  Phos  3.4     10-05  Mg     2.0     10-05    TPro  6.2  /  Alb  3.4  /  TBili  0.6  /  DBili  x   /  AST  53[H]  /  ALT  29  /  AlkPhos  115  10-05    PT/INR - ( 04 Oct 2024 15:04 )   PT: 12.2 sec;   INR: 1.04          PTT - ( 04 Oct 2024 15:04 )  PTT:34.7 sec  Urinalysis Basic - ( 05 Oct 2024 05:30 )    Color: x / Appearance: x / SG: x / pH: x  Gluc: 84 mg/dL / Ketone: x  / Bili: x / Urobili: x   Blood: x / Protein: x / Nitrite: x   Leuk Esterase: x / RBC: x / WBC x   Sq Epi: x / Non Sq Epi: x / Bacteria: x              RADIOLOGY & ADDITIONAL STUDIES:  CAROTID U/S:    CXR:    CT Scan:    EKG:    TTE / BARBIE:    Cardiac Cath: Surgeon: Dr. Pierre    Requesting Physician: Dr. Michael    HISTORY OF PRESENT ILLNESS:  54y Male with PMHx of hemorrhagic pancreatitis (followed by Dr. Solano), Type A aortic dissection s/p Dacron Grafting and AV resuspension (2013) and total arch replacement, AVR (23 mm), CABG x 1 (SVG-RCA) and aorta-axillary bypass graft (2023), PE (s/p IVC now removed 9/2024), HTN, seizure disorder who presented to [facility] on 10/5 after outpatient labs showed hemoglobin of 8, with repeat in ED showing 5.9. Patine reports he has been feeling weak and lightheaded for about four months. He was given 1U of PRBC and admitted to medicine service for management/work-up of anemia. Labs showing schistocytes and elevated LDH with concerns for hemolytic anemia. CTS was consulted given concern for bioprosthetic valve causing hemolytic anemia.     Patient most recently seen by Dr. Pierre in 5/2024 with repeat imaging showing stability of repair. Most recent echo in June 2024 showed well seated valve with normal function. Reports his weakness has improved since his 1U of PRBC. Also endorses some mild weakness limiting his ambulation and LE swelling that improved with compression stockings.,     PAST MEDICAL & SURGICAL HISTORY:  HTN (hypertension)      Aortic dissection      CAD (coronary artery disease)      Seizure disorder      Seizure disorder      Hypertension      Status post endovascular aneurysm repair (EVAR)      Pancreatitis  hospitalized for 5 months per spouse      DVT, lower extremity      Pulmonary embolism      S/P aortic bifurcation bypass graft      S/P CABG x 1      Elective surgery  IVC filter          MEDICATIONS  (STANDING):  aspirin  chewable 81 milliGRAM(s) Oral daily  atorvastatin 20 milliGRAM(s) Oral at bedtime  clindamycin   Capsule 300 milliGRAM(s) Oral every 6 hours  eslicarbazepine 800 milliGRAM(s) Oral at bedtime  lacosamide 200 milliGRAM(s) Oral two times a day  multivitamin 1 Tablet(s) Oral daily  pantoprazole    Tablet 40 milliGRAM(s) Oral before breakfast    MEDICATIONS  (PRN):  acetaminophen     Tablet .. 650 milliGRAM(s) Oral every 6 hours PRN Temp greater or equal to 38C (100.4F), Mild Pain (1 - 3)  aluminum hydroxide/magnesium hydroxide/simethicone Suspension 30 milliLiter(s) Oral every 4 hours PRN Dyspepsia  melatonin 3 milliGRAM(s) Oral at bedtime PRN Insomnia  ondansetron Injectable 4 milliGRAM(s) IV Push every 8 hours PRN Nausea and/or Vomiting      Allergies    No Known Allergies    Intolerances        SOCIAL HISTORY:  Smoker: NO   ETOH use:  NO  Ilicit Drug use:  YES -- marijuana     FAMILY HISTORY:  No pertinent family history in first degree relatives        Review of Systems:  CONSTITUTIONAL: Denies fevers / chills, sweats, fatigue, weight loss, weight gain                                       NEURO:  Denies parathesias, seizures, syncope, confusion                                                                                  EYES:  Denies blurry vision, discharge, pain, loss of vision                                                                                    ENMT:  Denies difficulty hearing, vertigo, dysphagia, epistaxis, recent dental work                                       CV:  Denies chest pain, palpitations, TAYLOR, orthopnea                                                                                           RESPIRATORY:  Denies wWheezing, SOB, cough / sputum, hemoptysis                                                               GI:  Denies nausea, vomiting, diarrhea, constipation, melena                                                                          : Denies hematuria, dysuria, urgency, incontinence                                                                                          MUSKULOSKELETAL:  Denies arthritis, joint swelling, muscle weakness                                                             SKIN/BREAST:  Denies rash, itching, hair loss, masses                                                                                              PSYCH:  Denies depression, anxiety, suicidal ideation                                                                                                HEME/LYMPH:  Denies bruises easily, enlarged lymph nodes, tender lymph nodes                                          ENDOCRINE:  Denies cold intolerance, heat intolerance, polydipsia                                                                      Vital Signs Last 24 Hrs  T(C): 37.2 (05 Oct 2024 06:11), Max: 37.2 (05 Oct 2024 06:11)  T(F): 98.9 (05 Oct 2024 06:11), Max: 98.9 (05 Oct 2024 06:11)  HR: 58 (05 Oct 2024 06:11) (48 - 85)  BP: 130/59 (05 Oct 2024 06:11) (95/55 - 145/82)  BP(mean): --  RR: 18 (05 Oct 2024 06:11) (16 - 20)  SpO2: 100% (05 Oct 2024 06:11) (96% - 100%)    Parameters below as of 05 Oct 2024 06:11  Patient On (Oxygen Delivery Method): room air        Physical Exam (Need 8)  General: Sitting at edge of bed comfortably in NAD  Neuro: A&Ox3, no focal deficits   HEENT: NCAT, EOMI   Cardiac: Regular rate and rhythm, normal S1 and S2. + murmur   Pulm: Breathing comfortably on RA. No signs of respiratory distress. Lungs are CTA b/l without wheezes, rales, or rhonchi   Abdomen: Soft, non-distended, non-tender. + bowel sounds   : No coulter  Extremities: Warm and well perfused, no peripheral edema, distal pulses 2+. No calf tenderness. SCDs and TEDs in place  MSK: Full AROM                                                           LABS:                        8.7    7.08  )-----------( 176      ( 05 Oct 2024 05:30 )             28.9     10-05    136  |  104  |  22  ----------------------------<  84  4.2   |  25  |  1.00    Ca    8.6      05 Oct 2024 05:30  Phos  3.4     10-05  Mg     2.0     10-05    TPro  6.2  /  Alb  3.4  /  TBili  0.6  /  DBili  x   /  AST  53[H]  /  ALT  29  /  AlkPhos  115  10-05    PT/INR - ( 04 Oct 2024 15:04 )   PT: 12.2 sec;   INR: 1.04          PTT - ( 04 Oct 2024 15:04 )  PTT:34.7 sec  Urinalysis Basic - ( 05 Oct 2024 05:30 )    Color: x / Appearance: x / SG: x / pH: x  Gluc: 84 mg/dL / Ketone: x  / Bili: x / Urobili: x   Blood: x / Protein: x / Nitrite: x   Leuk Esterase: x / RBC: x / WBC x   Sq Epi: x / Non Sq Epi: x / Bacteria: x              RADIOLOGY & ADDITIONAL STUDIES:

## 2024-10-06 LAB
ALBUMIN SERPL ELPH-MCNC: 3.9 G/DL — SIGNIFICANT CHANGE UP (ref 3.3–5)
ALP SERPL-CCNC: 120 U/L — SIGNIFICANT CHANGE UP (ref 40–120)
ALT FLD-CCNC: 29 U/L — SIGNIFICANT CHANGE UP (ref 10–45)
ANION GAP SERPL CALC-SCNC: 10 MMOL/L — SIGNIFICANT CHANGE UP (ref 5–17)
ANISOCYTOSIS BLD QL: SIGNIFICANT CHANGE UP
AST SERPL-CCNC: 61 U/L — HIGH (ref 10–40)
BACTERIA UR CULT: NORMAL
BILIRUB SERPL-MCNC: 1 MG/DL — SIGNIFICANT CHANGE UP (ref 0.2–1.2)
BUN SERPL-MCNC: 14 MG/DL — SIGNIFICANT CHANGE UP (ref 7–23)
CALCIUM SERPL-MCNC: 9 MG/DL — SIGNIFICANT CHANGE UP (ref 8.4–10.5)
CHLORIDE SERPL-SCNC: 102 MMOL/L — SIGNIFICANT CHANGE UP (ref 96–108)
CO2 SERPL-SCNC: 26 MMOL/L — SIGNIFICANT CHANGE UP (ref 22–31)
CREAT SERPL-MCNC: 0.84 MG/DL — SIGNIFICANT CHANGE UP (ref 0.5–1.3)
EGFR: 103 ML/MIN/1.73M2 — SIGNIFICANT CHANGE UP
GIANT PLATELETS BLD QL SMEAR: PRESENT — SIGNIFICANT CHANGE UP
GLUCOSE SERPL-MCNC: 84 MG/DL — SIGNIFICANT CHANGE UP (ref 70–99)
HCT VFR BLD CALC: 29.3 % — LOW (ref 39–50)
HGB BLD-MCNC: 9.1 G/DL — LOW (ref 13–17)
HYPOCHROMIA BLD QL: SLIGHT — SIGNIFICANT CHANGE UP
INR BLD: 1.17 — HIGH (ref 0.85–1.16)
MACROCYTES BLD QL: SLIGHT — SIGNIFICANT CHANGE UP
MAGNESIUM SERPL-MCNC: 1.9 MG/DL — SIGNIFICANT CHANGE UP (ref 1.6–2.6)
MANUAL SMEAR VERIFICATION: SIGNIFICANT CHANGE UP
MCHC RBC-ENTMCNC: 29.4 PG — SIGNIFICANT CHANGE UP (ref 27–34)
MCHC RBC-ENTMCNC: 31.1 GM/DL — LOW (ref 32–36)
MCV RBC AUTO: 94.5 FL — SIGNIFICANT CHANGE UP (ref 80–100)
MELD SCORE WITH DIALYSIS: 21 POINTS — SIGNIFICANT CHANGE UP
MELD SCORE WITHOUT DIALYSIS: 8 POINTS — SIGNIFICANT CHANGE UP
MICROCYTES BLD QL: SLIGHT — SIGNIFICANT CHANGE UP
NRBC # BLD: 1 /100 WBCS — HIGH (ref 0–0)
NRBC # BLD: SIGNIFICANT CHANGE UP /100 WBCS (ref 0–0)
OVALOCYTES BLD QL SMEAR: SLIGHT — SIGNIFICANT CHANGE UP
PHOSPHATE SERPL-MCNC: 3.8 MG/DL — SIGNIFICANT CHANGE UP (ref 2.5–4.5)
PLAT MORPH BLD: ABNORMAL
PLATELET # BLD AUTO: 169 K/UL — SIGNIFICANT CHANGE UP (ref 150–400)
POIKILOCYTOSIS BLD QL AUTO: SIGNIFICANT CHANGE UP
POLYCHROMASIA BLD QL SMEAR: SLIGHT — SIGNIFICANT CHANGE UP
POTASSIUM SERPL-MCNC: 3.8 MMOL/L — SIGNIFICANT CHANGE UP (ref 3.5–5.3)
POTASSIUM SERPL-SCNC: 3.8 MMOL/L — SIGNIFICANT CHANGE UP (ref 3.5–5.3)
PROMYELOCYTES # FLD: 1.8 % — HIGH (ref 0–0)
PROT SERPL-MCNC: 6.8 G/DL — SIGNIFICANT CHANGE UP (ref 6–8.3)
PROTHROM AB SERPL-ACNC: 13.4 SEC — SIGNIFICANT CHANGE UP (ref 9.9–13.4)
RBC # BLD: 3.1 M/UL — LOW (ref 4.2–5.8)
RBC # FLD: 24.4 % — HIGH (ref 10.3–14.5)
RBC BLD AUTO: ABNORMAL
SCHISTOCYTES BLD QL AUTO: SIGNIFICANT CHANGE UP
SMUDGE CELLS # BLD: PRESENT — SIGNIFICANT CHANGE UP
SODIUM SERPL-SCNC: 138 MMOL/L — SIGNIFICANT CHANGE UP (ref 135–145)
SPHEROCYTES BLD QL SMEAR: SLIGHT — SIGNIFICANT CHANGE UP
WBC # BLD: 6.85 K/UL — SIGNIFICANT CHANGE UP (ref 3.8–10.5)
WBC # FLD AUTO: 6.85 K/UL — SIGNIFICANT CHANGE UP (ref 3.8–10.5)

## 2024-10-06 PROCEDURE — 95720 EEG PHY/QHP EA INCR W/VEEG: CPT

## 2024-10-06 PROCEDURE — 99221 1ST HOSP IP/OBS SF/LOW 40: CPT

## 2024-10-06 RX ORDER — LACOSAMIDE 10 MG/ML
200 SOLUTION ORAL EVERY 12 HOURS
Refills: 0 | Status: DISCONTINUED | OUTPATIENT
Start: 2024-10-06 | End: 2024-10-08

## 2024-10-06 RX ORDER — ACETAMINOPHEN 325 MG
975 TABLET ORAL EVERY 8 HOURS
Refills: 0 | Status: DISCONTINUED | OUTPATIENT
Start: 2024-10-06 | End: 2024-10-08

## 2024-10-06 RX ORDER — AMLODIPINE BESYLATE 5 MG
10 TABLET ORAL EVERY 24 HOURS
Refills: 0 | Status: DISCONTINUED | OUTPATIENT
Start: 2024-10-07 | End: 2024-10-07

## 2024-10-06 RX ORDER — HYDRALAZINE HYDROCHLORIDE 100 MG/1
10 TABLET ORAL EVERY 8 HOURS
Refills: 0 | Status: DISCONTINUED | OUTPATIENT
Start: 2024-10-06 | End: 2024-10-07

## 2024-10-06 RX ORDER — ESLICARBAZEPINE ACETATE 600 MG/1
800 TABLET ORAL DAILY
Refills: 0 | Status: DISCONTINUED | OUTPATIENT
Start: 2024-10-06 | End: 2024-10-07

## 2024-10-06 RX ADMIN — Medication 81 MILLIGRAM(S): at 08:41

## 2024-10-06 RX ADMIN — CLINDAMYCIN PHOSPHATE 300 MILLIGRAM(S): 150 INJECTION, SOLUTION INTRAVENOUS at 23:28

## 2024-10-06 RX ADMIN — ATORVASTATIN CALCIUM 20 MILLIGRAM(S): 10 TABLET, FILM COATED ORAL at 21:16

## 2024-10-06 RX ADMIN — Medication 10 MILLIGRAM(S): at 11:25

## 2024-10-06 RX ADMIN — Medication 650 MILLIGRAM(S): at 16:19

## 2024-10-06 RX ADMIN — Medication 650 MILLIGRAM(S): at 06:53

## 2024-10-06 RX ADMIN — LACOSAMIDE 200 MILLIGRAM(S): 10 SOLUTION ORAL at 18:49

## 2024-10-06 RX ADMIN — LACOSAMIDE 200 MILLIGRAM(S): 10 SOLUTION ORAL at 07:44

## 2024-10-06 RX ADMIN — CLINDAMYCIN PHOSPHATE 300 MILLIGRAM(S): 150 INJECTION, SOLUTION INTRAVENOUS at 11:26

## 2024-10-06 RX ADMIN — CLINDAMYCIN PHOSPHATE 300 MILLIGRAM(S): 150 INJECTION, SOLUTION INTRAVENOUS at 00:04

## 2024-10-06 RX ADMIN — MULTI VITAMIN/MINERAL SUPPLEMENT WITH ASCORBIC ACID, NIACIN, PYRIDOXINE, PANTOTHENIC ACID, FOLIC ACID, RIBOFLAVIN, THIAMIN, BIOTIN, COBALAMIN AND ZINC. 1 TABLET(S): 60; 20; 12.5; 10; 10; 1.7; 1.5; 1; .3; .006 TABLET, COATED ORAL at 08:41

## 2024-10-06 RX ADMIN — HYDRALAZINE HYDROCHLORIDE 10 MILLIGRAM(S): 100 TABLET ORAL at 18:55

## 2024-10-06 RX ADMIN — CLINDAMYCIN PHOSPHATE 300 MILLIGRAM(S): 150 INJECTION, SOLUTION INTRAVENOUS at 06:35

## 2024-10-06 RX ADMIN — Medication 975 MILLIGRAM(S): at 19:30

## 2024-10-06 RX ADMIN — Medication 975 MILLIGRAM(S): at 18:48

## 2024-10-06 RX ADMIN — Medication 650 MILLIGRAM(S): at 15:27

## 2024-10-06 RX ADMIN — CLINDAMYCIN PHOSPHATE 300 MILLIGRAM(S): 150 INJECTION, SOLUTION INTRAVENOUS at 17:21

## 2024-10-06 RX ADMIN — ESLICARBAZEPINE ACETATE 800 MILLIGRAM(S): 600 TABLET ORAL at 19:51

## 2024-10-06 NOTE — PROGRESS NOTE ADULT - PROBLEM SELECTOR PLAN 1
Patient with history of anemia, presenting with 4 months of progressive weakness and lightheadedness. Was found to have Hb of 8 today at [facility]. Hb 5.9 on admission. Baseline Hb around 9-10 per chart review. MCV 98.5 Plt 158. Schistocytes on peripheral smear and labs suggestive of hemolysis likely intravascular shearing.    Plan:  - Continue home pantoprazole 40 mg qd  - daily H/H, maintain Hb>8 iso CAD  - resume diet, no planned intervention  - monitor hemodynamics and for bleeding  - CTS consulted for concern of valvular dysfunction leading to shearing  - TTE pending  - f/u [facility]  - Heme c/s for evaluation of other causes of hemolysis pending TTE and [facility] Patient with history of anemia, presenting with 4 months of progressive weakness and lightheadedness. Was found to have Hb of 8 today at [facility]. Hb 5.9 on admission. Baseline Hb around 9-10 per chart review. MCV 98.5 Plt 158. Schistocytes on peripheral smear and labs suggestive of hemolysis likely intravascular shearing.    Plan:  - Continue home pantoprazole 40 mg qd  - daily H/H, maintain Hb>8 iso CAD  - monitor hemodynamics and for bleeding  - CTS consulted for concern of valvular dysfunction leading to shearing  - TTE pending  - f/u Coomb's, NIGEL  - Heme c/s for evaluation of other causes of hemolysis pending TTE and [facility]  - Consulted Cardio for adjustment of antihypertensives for reducing afterload to prevent excessive shearing

## 2024-10-06 NOTE — CONSULT NOTE ADULT - ASSESSMENT
55yo M w/ known seizure disorder follows with private neurologist "Dr. Delilah Hernandez ([phone]) currently on 2AEDs. Sent in by his Urologist for low Hgb (8). Admitted for anemia (POA 5.9) s/p 1pRBC 10/04. 10/5 Spouse reported episode of blank stare, EEG was initiated. Epilepsy consulted for witnessed blank stare. Per chart review he was seen at [facility] for 3 witnessed seizure event w/in 24hrs 02/15/24. No seizure/epileptiform activity on EEG. Discharged on levetiracetam 750mg and Lacosamide 200mg. Levetiracetam was changed to Aptiom 800mg ....... EEG overnight w/ occasional right temporal epileptogenic spikes.     Impression  Unclear witnessed seizure event. EEG abnormal however, stable on current AEDs. last seizure reported ......    Recommendations  check Aptiom level prior to next administration  check Lacosamide level prior to next administration  c/w Lacosamide 200mg po BID (0800 and 2000)  c/w Aptiom 800mg po HS  (2000)  Keep K > 4, Mg > 2 and Phos > 2.5  Maintain Seizure and aspiration risk precaution 53yo M w/ known seizure disorder follows with private neurologist "Dr. Delilah Hernandez ([phone]) currently on 2AEDs. Sent in by his Urologist for low Hgb (8). Admitted for anemia (POA 5.9) s/p 1pRBC 10/04. 10/5 Spouse reported episode of blank stare, EEG was initiated. Epilepsy consulted for witnessed blank stare. Per chart review he was seen at [facility] for 3 witnessed seizure event w/in 24hrs 02/15/24. No seizure/epileptiform activity on EEG. Discharged on levetiracetam 750mg and Lacosamide 200mg. Levetiracetam was changed to Aptiom 800mg 02/2024. EEG overnight w/ occasional right temporal epileptogenic spikes.     Impression  Witnessed staring spells w/ PATTI 3-5s. 3rd episode w/in 4 months. Unclear if provoke vs Breakthrough focal seizure vs non-epileptic event.      Recommendations  cont vEEG for another night in hopes of characterization w/ electroclinical capture   check Aptiom level prior to next administration  check Lacosamide level prior to next administration  c/w Lacosamide 200mg po BID (0800 and 2000)  c/w Aptiom 800mg po HS  (2000)  Keep K > 4, Mg > 2 and Phos > 2.5  Maintain Seizure and aspiration risk precaution    rest of care by medicine team     case discussed with epileptologist 53yo M w/ known seizure disorder follows with private neurologist "Dr. Delilah Hernandez ([phone]) currently on 2AEDs. Sent in by his Urologist for low Hgb (8). Admitted for anemia (POA 5.9) s/p 1pRBC 10/04. 10/5 Spouse reported episode of blank stare, EEG was initiated. Epilepsy consulted for witnessed blank stare. Per chart review he was seen at [facility] for 3 witnessed seizure event w/in 24hrs 02/15/24. No seizure/epileptiform activity on EEG. Discharged on levetiracetam 750mg and Lacosamide 200mg. Levetiracetam was changed to Aptiom 800mg 02/2024. EEG overnight w/ occasional right temporal epileptogenic spikes.     Impression  Witnessed staring spells w/ PATTI 3-5s. 3rd episode w/in 4 months. Unclear if provoke vs Breakthrough focal seizure     Recommendations  cont vEEG for another night in hopes of characterization w/ electroclinical capture   check Aptiom level prior to next administration  check Lacosamide level prior to next administration  c/w Lacosamide 200mg po BID (0800 and 2000)  c/w Aptiom 800mg po HS  (2000)  Keep K > 4, Mg > 2 and Phos > 2.5  Maintain Seizure and aspiration risk precaution    rest of care by medicine team     case discussed with epileptologist

## 2024-10-06 NOTE — PROGRESS NOTE ADULT - PROBLEM SELECTOR PLAN 9
F:   E: replete as needed  N: dash  DVT ppx: SCD F: PO   E: replete as needed  N: dash + Ensures high protein BID  DVT ppx: SCD

## 2024-10-06 NOTE — PROGRESS NOTE ADULT - ATTENDING COMMENTS
54 year old male with PMHx of hemorrhagic pancreatitis, type A aortic dissection s/p Dacron grafting and AV resuspension (2013) and total arch replacement with bioprosthetic AVR, CAD s/p CABG 5/2023, LE DVT and pulmonary embolism now off AC, seizure disorder, who presented for subacute weakness and fatigue admitted for symptomatic anemia with evidence of hemolysis, suspect 2/2 mechanical shearing.    Hgb continues to improve following 1u pRBC on admission. LDH elevated, hapto low and smear still with marked schistocytes and slight spherocytes, some degree of hypoproliferation as well given RI 1.1, bilirubin is normal. Suspect slow hemolysis. Coags and plts wnl, less concerning for MAHA. Yesterday noted with episode of blank staring, vEEG in the last 24h with spikes over R temporal region suggestive of underlying epileptic potential.    #symptomatic anemia  - f/u TTE - c/f AVR malfunction leading to mechanical shearing  - CTS consulted  - f/u Coomb's  - can consult Heme for evaluation if no obvious reason elicited on above work up    #history of type A aortic dissection s/p graft, bioprosthetic AVR, total arch replacement  #CAD s/p CABG #HTN  - on bisoprolol 10mg at home, held for bradycardia HR 50s  - unclear whether pt needs continued negative inotropy in the setting of prior type A dissection  - discuss with Cardiology re: restarting BB iso bradycardia vs alternative, as well as recommended agent for afterload reduction    #seizure disorder  - vEEG with spikes in R temporal region  - Epilepsy consulted  - continue home AED for now 54 year old male with PMHx of hemorrhagic pancreatitis, type A aortic dissection s/p Dacron grafting and AV resuspension (2013) and total arch replacement with bioprosthetic AVR, CAD s/p CABG 5/2023, LE DVT and pulmonary embolism now off AC, seizure disorder, who presented for subacute weakness and fatigue admitted for symptomatic anemia with evidence of hemolysis, suspect 2/2 mechanical shearing.    Hgb continues to improve following 1u pRBC on admission. LDH elevated, hapto low and smear still with marked schistocytes and slight spherocytes, some degree of hypoproliferation as well given RI 1.1, bilirubin is normal. Suspect slow hemolysis. Coags and plts wnl, less concerning for MAHA. Yesterday noted with episode of blank staring, vEEG in the last 24h with spikes over R temporal region suggestive of underlying epileptic potential.    #symptomatic anemia  - f/u TTE - c/f AVR malfunction leading to mechanical shearing  - CTS consulted  - f/u Coomb's  - f/u iron panel  - can consult Heme for evaluation if no obvious reason elicited on above work up    #history of type A aortic dissection s/p graft, bioprosthetic AVR, total arch replacement  #CAD s/p CABG #HTN  - on bisoprolol 10mg at home, held for bradycardia HR 50s  - unclear whether pt needs continued negative inotropy in the setting of prior type A dissection  - discuss with Cardiology re: restarting BB iso bradycardia vs alternative, as well as recommended agent for afterload reduction    #seizure disorder  - vEEG with spikes in R temporal region  - Epilepsy consulted  - continue home AED for now 54 year old male with PMHx of hemorrhagic pancreatitis, type A aortic dissection s/p Dacron grafting and AV resuspension (2013) and total arch replacement with bioprosthetic AVR, CAD s/p CABG 5/2023, LE DVT and pulmonary embolism now off AC, seizure disorder, who presented for subacute weakness and fatigue admitted for symptomatic anemia with evidence of hemolysis, suspect 2/2 mechanical shearing.    Hgb continues to improve following 1u pRBC on admission. LDH elevated, hapto low and smear still with marked schistocytes and slight spherocytes, some degree of hypoproliferation as well given RI 1.1, bilirubin is normal. Suspect slow hemolysis. Coags and plts wnl, less concerning for MAHA. Yesterday noted with episode of blank staring, vEEG in the last 24h with spikes over R temporal region suggestive of underlying epileptic potential. On exam with loud systolic murmur, no edema and overall unchanged from yesterday.    #symptomatic anemia  - f/u TTE - c/f AVR malfunction leading to mechanical shearing  - CTS consulted  - f/u Coomb's  - f/u iron panel  - can consult Heme for evaluation if no obvious reason elicited on above work up    #history of type A aortic dissection s/p graft, bioprosthetic AVR, total arch replacement  #CAD s/p CABG #HTN  - on bisoprolol 10mg at home, held for bradycardia HR 50s  - unclear whether pt needs continued negative inotropy in the setting of prior type A dissection  - discuss with Cardiology re: restarting BB iso bradycardia vs alternative, as well as recommended agent for afterload reduction    #seizure disorder  - vEEG with spikes in R temporal region  - Epilepsy consulted  - continue home AED for now

## 2024-10-06 NOTE — CONSULT NOTE ADULT - ATTENDING COMMENTS
see follow up note 10/7
Patient is a 55 yo Male with PMHx of Type A aortic dissection s/p Dacron Grafting and AV resuspension (2013) and total arch replacement, AVR (23-mm Inspir[employer] biological valve), CABG x 1 (SVG-RCA) and aorta-axillary bypass graft (2023), PE (s/p IVC now removed 9/2024), Hemorrhagic pancreatitis (followed by Dr. Solano), recent dental procedure (10/1) on clindamycin (x 2 weeks) who presented to [facility] on 10/5 for anemia, Found to have hemolytic anemia, with elevated Gradients across BioAVR for which cardiology was consulted    Review of Studies:  - Doppler LLE 8/20/24: No LLE DVT   - EKG 10/4/24: sinus bradycardia  - TTE 10/07/2024:There is moderate concentric left ventricular hypertrophy. The left ventricle is normal in size and systolic function with a calculated   ejection fraction of 65%. Normal right ventricular size and systolic function. Severely dilated left atrium. A bioprosthetic valve noted in the aortic position. The peak transvalvular velocity is 3.59 m/s, the mean transvalvular gradient is 25.00 mmHg, and the LVOT/AV velocity ratio is 0.49. The peak transaortic gradient is 51.55 mmHg. There is no evidence of aortic regurgitation. Chordal SHAJI present without LVOT obstruction. Probably moderate   mitral regurgitation. Moderate tricuspid regurgitation. Pulmonary hypertension present, pulmonary artery systolic pressure is 54 mmHg.  - TTE 6/10/2024: LVEF 70%, moderate LVH, G2DD, LA severely dilated, RA moderately dilated bioprosthetic aortic valve – well seated with normal function, echogenic graft material noted in prox ascending aorta c/w known collins-arch replacement, mild MR, mild to moderate TR   - CTA chest aorta 4/2024: Status post aortic valve replacement and graft replacement of ascending aorta and portion of aortic arch with TEVAR of the descending aorta as seen on prior studies. Decreased size of aneurysm of the descending aorta now measuring 5 cm. No endoleak. Nonocclusive dissection flap again noted in the right subclavian artery and right common carotid artery. Patent bypass graft of the left subclavian artery. 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   - [facility] 03/2023: Via Left Radial Approach Coronary angiogram demonstrated a left dominant system with mild luminal irregularities. Patent SVG to non dominant small RCA        Cardiologist: Vivi last seen 7/2024.      Home meds: aspirin 81mg, amlodipine 10mg qd, atorvastatin 20mg, bisoprolol 10mg qd, bumex 2mg qd, hctz 12.5mg qd, lacosamide 200mg qd, protonix 40mg po qd     # Type A aortic dissection s/p Dacron Grafting and AV resuspension (2013) and total arch replacement, AVR (23-mm [employer] biological valve  # Hemolytic anemia  # CABG x1 aorta-axillary bypass graft (2023)    - Patient underwent Dacron Grafting and AV resuspension (2013) and total arch replacement, AVR (23-mm Inspir[employer] biological valve in 2013 as well as aorta-axillary bypass graft (2023)  - He underwent LHC via L radial access in 03/2023 which revealed mild luminal irregularities  - Serial previous Echos have shown LVH with normal BIV function with normal function BIoAVR  - Echo this hospitalization reviewed showing normal BIV function with elevated gradients across the Bio AVR, now with PV of 3.59 m/s (Previously 2.60), mean transvalvular gradient of 25.00 mmHg (Previously 14 mmHg), and the LVOT/AV velocity ratio is 0.49 (Previously 0.74). Additionally there appears to be Chordal SHAJI with moderate MR  - I am concerned that Hemolysis is resulting from Mechanical Shearing. Additionally there are features suggestive of HCM noted on Echo that I think should be further evaluated  - Patient would benefit from BARBIE (Valve assessment) and Cardiac MRI (HCM eval)  - In the interim, he will need good BP control.  - As patient bradycardic, would defer resuming home Bisoprolol and Purse BP control with Nifedipine 30 mg po Daily and Hydralazine 25 mg po TID with strict holding parameters. Given extensive Hx patient would benefit from being normotensive (SBP in the 120/80 range)  - CTS team following  - Patient endorsed to Dr Connors for Transfer to Cardiac tele.

## 2024-10-06 NOTE — CONSULT NOTE ADULT - SUBJECTIVE AND OBJECTIVE BOX
HPI  54y Male     Epilepsy risk factors:  Head injury with subsequent LOC?:  Febrile seizures in infancy?:  Hx of CNS infection?:  Family hx of epilepsy?:  Known CNS pathology?:  Birth history:     Prior AEDs      Prior imaging     Prior EEGs     Other diagnostic work-up     ROS  Per HPI, otherwise negative for constitutional/eyes/ENMT/cardiovascular/respiratory/GI//musculoskeletal/skin/breasts/psych/hem/lymph.     PAST MEDICAL & SURGICAL HISTORY:  HTN (hypertension)      Aortic dissection      CAD (coronary artery disease)      Seizure disorder      Seizure disorder      Hypertension      Status post endovascular aneurysm repair (EVAR)      Pancreatitis  hospitalized for 5 months per spouse      DVT, lower extremity      Pulmonary embolism      S/P aortic bifurcation bypass graft      S/P CABG x 1      Elective surgery  IVC filter           FAMILY HISTORY:  No pertinent family history in first degree relatives         Social History:  Alcohol, illicits, smoking?:  Driving?:  Occupation:     Allergies  No Known Allergies       MEDICATIONS  (STANDING):  amLODIPine   Tablet 10 milliGRAM(s) Oral every 24 hours  aspirin  chewable 81 milliGRAM(s) Oral daily  atorvastatin 20 milliGRAM(s) Oral at bedtime  clindamycin   Capsule 300 milliGRAM(s) Oral every 6 hours  eslicarbazepine 800 milliGRAM(s) Oral at bedtime  lacosamide 200 milliGRAM(s) Oral every 12 hours  multivitamin 1 Tablet(s) Oral daily  pantoprazole    Tablet 40 milliGRAM(s) Oral before breakfast    MEDICATIONS  (PRN):  acetaminophen     Tablet .. 650 milliGRAM(s) Oral every 6 hours PRN Temp greater or equal to 38C (100.4F), Mild Pain (1 - 3)  aluminum hydroxide/magnesium hydroxide/simethicone Suspension 30 milliLiter(s) Oral every 4 hours PRN Dyspepsia  melatonin 3 milliGRAM(s) Oral at bedtime PRN Insomnia  ondansetron Injectable 4 milliGRAM(s) IV Push every 8 hours PRN Nausea and/or Vomiting       T(C): 36.9 (10-06-24 @ 11:28), Max: 37.3 (10-05-24 @ 20:33)  HR: 61 (10-06-24 @ 11:28) (55 - 61)  BP: 167/78 (10-06-24 @ 11:28) (161/71 - 167/78)  RR: 18 (10-06-24 @ 11:28) (18 - 18)  SpO2: 97% (10-06-24 @ 11:28) (97% - 99%)      gen: no acute distress.  HEENT: trachea midline, no jaundice or icterus.  CV: RRR, s1+s2, - m/r/g  Pulm: CTAB  Abd: soft, nt, nd  Ext: well-perfused, no edema.  Alertness: eyes open, pt attentive to examiner.  Orientation: person accurate, date accurate, place accurate.  Language: naming intact. Repetition intact. No paraphasias or neologisms. Fluent with appropriate sentences.  Attention: TABLE forwards intact, backwards intact.  Calculation: 7 quarters in $1.75.  Visual/tactile neglect?: none.  Memory: registration of 3 words intact, repetition at 5 minutes intact.  Right left confusion?: no.  Finger agnosia?: no.  II: Visual fields intact.  III, IV, VI: EOMI. No nystagmus. PERRLA 3>2 bilaterally. Discs sharp.  V: intact to light touch.  VII: smile symmetrical. Eyebrow elevation symmetrical. Eye closure symmetrical. No flattening of nasolabial fold.  VIII: Able to localize finger rub.  IX, X: palate elevation symmetrical.  XI: sternocleidomastoid 5R/5L, trapezius 5R/5L  XII: no tongue deviation.  Motor: no atrophy or fasciculations. No tremor. No hypo/hyperkinesia. No pronator drift. Tone normal. Finger tap rapid and equal on both sides. Strength: deltoids 5R/5L, biceps 5R/5L, triceps 5R/5L, wrist flexors 5R/5L, wrist extensors 5R/5L,  strong and equal R/L, hip flexors 5R/5L, leg flexors 5R/5L, leg extensors 5R/5L, ankle plantarflexion 5R/5L, ankle dorsiflexion 5R/5L  Reflexes: biceps 2R/2L, triceps 2R/2L, brachioradialis 2R/2L, patellar 2R/2L, ankles 2R/2L. Toes down R/L  Coordination: finger to nose without dysmetria or overshoot R/L. Heel to castro without dysmetria or overshoot R/L. Romberg negative.  Gait: normal heel/toe/tandem.  Sensory: intact on R and L hemibody to light touch, pin, proprioception, vibration.     Labs  CBC Full  -  ( 06 Oct 2024 05:30 )  WBC Count : 6.85 K/uL  RBC Count : 3.10 M/uL  Hemoglobin : 9.1 g/dL  Hematocrit : 29.3 %  Platelet Count - Automated : 169 K/uL  Mean Cell Volume : 94.5 fl  Mean Cell Hemoglobin : 29.4 pg  Mean Cell Hemoglobin Concentration : 31.1 gm/dL      10-06    138  |  102  |  14  ----------------------------<  84  3.8   |  26  |  0.84    Ca    9.0      06 Oct 2024 05:30  Phos  3.8     10-06  Mg     1.9     10-06    TPro  6.8  /  Alb  3.9  /  TBili  1.0  /  DBili  x   /  AST  61[H]  /  ALT  29  /  AlkPhos  120  10-06    PT/INR - ( 06 Oct 2024 05:30 )   PT: 13.4 sec;   INR: 1.17      PTT - ( 04 Oct 2024 15:04 )  PTT:34.7 sec      EEG:  Daily Summary:  10/05-10/06  Occasional ( >1/hr < 1/min) spikes over right temporal region suggestive of underlying epileptic potential       HPI  54y Male     Epilepsy risk factors:  Head injury with subsequent LOC?:  Febrile seizures in infancy?:  Hx of CNS infection?:  Family hx of epilepsy?:  Known CNS pathology?:  Birth history:     Prior Depakote, Levetiracetam      Prior imagin/15/2024: mild cerebral volume loss with prominence of the ventricles and sulci. There are mild patchy areas of low attenuation within the periventricular and subcortical white matter which are nonspecific but likely the sequela of chronic microvascular change.     Prior EEGs: diffuse or multifocal cerebral dysfunction      Other diagnostic work-up     ROS  Per HPI, otherwise negative for constitutional/eyes/ENMT/cardiovascular/respiratory/GI//musculoskeletal/skin/breasts/psych/hem/lymph.     PAST MEDICAL & SURGICAL HISTORY:  HTN (hypertension)  Aortic dissection  CAD (coronary artery disease)  Seizure disorder  Hypertension  Pancreatitis hospitalized for 5 months per spouse  DVT, lower extremity  Pulmonary embolism    Status post endovascular aneurysm repair (EVAR)  S/P aortic bifurcation bypass graft  S/P CABG x 1  Elective surgery IVC filter       FAMILY HISTORY:  No pertinent family history in first degree relatives         Social History:  Alcohol, illicits, smoking?:  Driving?:  Occupation:     Allergies  No Known Allergies       MEDICATIONS  (STANDING):  amLODIPine   Tablet 10 milliGRAM(s) Oral every 24 hours  aspirin  chewable 81 milliGRAM(s) Oral daily  atorvastatin 20 milliGRAM(s) Oral at bedtime  clindamycin   Capsule 300 milliGRAM(s) Oral every 6 hours  eslicarbazepine 800 milliGRAM(s) Oral at bedtime  lacosamide 200 milliGRAM(s) Oral every 12 hours  multivitamin 1 Tablet(s) Oral daily  pantoprazole    Tablet 40 milliGRAM(s) Oral before breakfast    MEDICATIONS  (PRN):  acetaminophen     Tablet .. 650 milliGRAM(s) Oral every 6 hours PRN Temp greater or equal to 38C (100.4F), Mild Pain (1 - 3)  aluminum hydroxide/magnesium hydroxide/simethicone Suspension 30 milliLiter(s) Oral every 4 hours PRN Dyspepsia  melatonin 3 milliGRAM(s) Oral at bedtime PRN Insomnia  ondansetron Injectable 4 milliGRAM(s) IV Push every 8 hours PRN Nausea and/or Vomiting       T(C): 36.9 (10-06-24 @ 11:28), Max: 37.3 (10-05-24 @ 20:33)  HR: 61 (10-06-24 @ 11:28) (55 - 61)  BP: 167/78 (10-06-24 @ 11:28) (161/71 - 167/78)  RR: 18 (10-06-24 @ 11:28) (18 - 18)  SpO2: 97% (10-06-24 @ 11:28) (97% - 99%)      gen: no acute distress.  HEENT: trachea midline, no jaundice or icterus.  CV: RRR, s1+s2, - m/r/g  Pulm: CTAB  Abd: soft, nt, nd  Ext: well-perfused, no edema.  Alertness: eyes open, pt attentive to examiner.  Orientation: person accurate, date accurate, place accurate.  Language: naming intact. Repetition intact. No paraphasias or neologisms. Fluent with appropriate sentences.  Attention: TABLE forwards intact, backwards intact.  Calculation: 7 quarters in $1.75.  Visual/tactile neglect?: none.  Memory: registration of 3 words intact, repetition at 5 minutes intact.  Right left confusion?: no.  Finger agnosia?: no.  II: Visual fields intact.  III, IV, VI: EOMI. No nystagmus. PERRLA 3>2 bilaterally. Discs sharp.  V: intact to light touch.  VII: smile symmetrical. Eyebrow elevation symmetrical. Eye closure symmetrical. No flattening of nasolabial fold.  VIII: Able to localize finger rub.  IX, X: palate elevation symmetrical.  XI: sternocleidomastoid 5R/5L, trapezius 5R/5L  XII: no tongue deviation.  Motor: no atrophy or fasciculations. No tremor. No hypo/hyperkinesia. No pronator drift. Tone normal. Finger tap rapid and equal on both sides. Strength: deltoids 5R/5L, biceps 5R/5L, triceps 5R/5L, wrist flexors 5R/5L, wrist extensors 5R/5L,  strong and equal R/L, hip flexors 5R/5L, leg flexors 5R/5L, leg extensors 5R/5L, ankle plantarflexion 5R/5L, ankle dorsiflexion 5R/5L  Reflexes: biceps 2R/2L, triceps 2R/2L, brachioradialis 2R/2L, patellar 2R/2L, ankles 2R/2L. Toes down R/L  Coordination: finger to nose without dysmetria or overshoot R/L. Heel to castro without dysmetria or overshoot R/L. Romberg negative.  Gait: normal heel/toe/tandem.  Sensory: intact on R and L hemibody to light touch, pin, proprioception, vibration.     Labs  CBC Full  -  ( 06 Oct 2024 05:30 )  WBC Count : 6.85 K/uL  RBC Count : 3.10 M/uL  Hemoglobin : 9.1 g/dL  Hematocrit : 29.3 %  Platelet Count - Automated : 169 K/uL  Mean Cell Volume : 94.5 fl  Mean Cell Hemoglobin : 29.4 pg  Mean Cell Hemoglobin Concentration : 31.1 gm/dL      10-06    138  |  102  |  14  ----------------------------<  84  3.8   |  26  |  0.84    Ca    9.0      06 Oct 2024 05:30  Phos  3.8     10-  Mg     1.9     10-06    TPro  6.8  /  Alb  3.9  /  TBili  1.0  /  DBili  x   /  AST  61[H]  /  ALT  29  /  AlkPhos  120  10-    PT/INR - ( 06 Oct 2024 05:30 )   PT: 13.4 sec;   INR: 1.17      PTT - ( 04 Oct 2024 15:04 )  PTT:34.7 sec      EEG:  Daily Summary:  10/05-10/06  Occasional ( >1/hr < 1/min) spikes over right temporal region suggestive of underlying epileptic potential       HPI  54y Male w/ HTN, Aortic dissection, CAD, Seizure, Pancreatitis, PE. Sent in byhis outpatient Urologist for low hemoglobin and admitted to medicine for Anemia. Hgb 5.9 s/p 1U pRBC. Concerns for hemolysis 2/2 mechanical AV now being followed by CTS. 10/05 Wife reported 3-5s staring spells. She stated that these events took place in mid conversation and he would then continue as if nothing had happened however, does not recalled what was said or the contents of the conversation. She stated that this is the third event. First was 2024 and second 24. Both times spouse is unsure if medications was missed. He stated that he feels that there is lost in time. He does not recall any other occurrence other than the first 2 his wife reported and does not remember yesterdays episode. He also stated that he does not feel he took his morning dose of his lacosamide yesterday, as he woke up to find the pill in his hand and wasn't sure what it was.     Last seizure reported and documented from [facility] was -15 in which he had 3 witnessed generalized seizure. 1 episode w/ post ictal confusion and bladder incontinence. At this time he was on monotherapy Lacosamide 200mg BID. No events or epileptiform activity were captured on EEG. He was discharged on Lacosamide 200mg BID and Levetiracetam 750mg BID. On follow up with Dr. Patel he was transition of Levetiracetam to Eslicarbazepine 800mg po HS. No seizure since other than reported events as stated above.        Epilepsy risk factors: None   Head injury with subsequent LOC?: None  Febrile seizures in infancy?: None   Hx of CNS infection?: None   Family hx of epilepsy?: None  Known CNS pathology?: None     Prior Depakote, Levetiracetam      Prior imagin/15/2024: mild cerebral volume loss with prominence of the ventricles and sulci. There are mild patchy areas of low attenuation within the periventricular and subcortical white matter which are nonspecific but likely the sequela of chronic microvascular change.      Prior EEGs: diffuse or multifocal cerebral dysfunction      Other diagnostic work-up     ROS  Per HPI, otherwise negative for constitutional/eyes/ENMT/cardiovascular/respiratory/GI//musculoskeletal/skin/breasts/psych/hem/lymph.     PAST MEDICAL & SURGICAL HISTORY:  HTN (hypertension)  Aortic dissection  CAD (coronary artery disease)  Seizure disorder  Pancreatitis hospitalized for 5 months per spouse  DVT, lower extremity  Pulmonary embolism    Status post endovascular aneurysm repair (EVAR)  S/P aortic bifurcation bypass graft  S/P CABG x 1  Elective surgery IVC filter       FAMILY HISTORY:  No pertinent family history in first degree relatives    Social History:  Alcohol, illicits, smoking?:  Driving?: No currently driving   Occupation:     Allergies: No Known Allergies       MEDICATIONS  (STANDING):  amLODIPine   Tablet 10 milliGRAM(s) Oral every 24 hours  aspirin  chewable 81 milliGRAM(s) Oral daily  atorvastatin 20 milliGRAM(s) Oral at bedtime  clindamycin   Capsule 300 milliGRAM(s) Oral every 6 hours  eslicarbazepine 800 milliGRAM(s) Oral at bedtime  lacosamide 200 milliGRAM(s) Oral every 12 hours  multivitamin 1 Tablet(s) Oral daily  pantoprazole    Tablet 40 milliGRAM(s) Oral before breakfast    MEDICATIONS  (PRN):  acetaminophen     Tablet .. 650 milliGRAM(s) Oral every 6 hours PRN Temp greater or equal to 38C (100.4F), Mild Pain (1 - 3)  aluminum hydroxide/magnesium hydroxide/simethicone Suspension 30 milliLiter(s) Oral every 4 hours PRN Dyspepsia  melatonin 3 milliGRAM(s) Oral at bedtime PRN Insomnia  ondansetron Injectable 4 milliGRAM(s) IV Push every 8 hours PRN Nausea and/or Vomiting       T(C): 36.9 (10-06-24 @ 11:28), Max: 37.3 (10-05-24 @ 20:33)  HR: 61 (10-06-24 @ 11:28) (55 - 61)  BP: 167/78 (10-06-24 @ 11:28) (161/71 - 167/78)  RR: 18 (10-06-24 @ 11:28) (18 - 18)  SpO2: 97% (10-06-24 @ 11:28) (97% - 99%)      gen: no acute distress.  HEENT: trachea midline, no jaundice or icterus.  Abd: soft, nt, nd  Ext: well-perfused, no edema.  Alertness: eyes open, pt attentive to examiner.  Orientation: person accurate, correct month, year but not day or date,  place accurate.  Language: naming intact. Repetition intact. No paraphasias or neologisms. Fluent with appropriate sentences.  Attention: TABLE forwards intact, backwards impaired.  Calculation: some impairment. 2 tries to get 7 quarters in $1.75  Visual/tactile neglect?: none.  Memory: registration of 3 words intact, repetition at 5 minutes 2/3.  Right left confusion?: no.  Finger agnosia?: no.  II: Visual fields intact.  III, IV, VI: EOMI. No nystagmus. PERRL.  V: intact to light touch.  VII: smile symmetrical. Eyebrow elevation symmetrical. Eye closure symmetrical. No flattening of nasolabial fold.  VIII: Able to localize finger rub.  IX, X: palate elevation symmetrical.  XI: sternocleidomastoid 5R/5L, trapezius 5R/5L  XII: no tongue deviation.  Motor: no atrophy or fasciculations. No tremor. No hypo/hyperkinesia. No pronator drift. Tone normal. Finger tap rapid and equal on both sides. Strength: deltoids 5R/5L, biceps 5R/5L, triceps 5R/5L, wrist flexors 5R/5L, wrist extensors 5R/5L,  strong and equal R/L, hip flexors 5R/5L, leg flexors 5R/5L, leg extensors 5R/5L, ankle plantarflexion 5R/5L, ankle dorsiflexion 5R/5L  Reflexes: biceps 2R/2L, triceps 2R/2L, brachioradialis 2R/2L, patellar 2R/2L, ankles 2R/2L. Toes down R/L  Coordination: finger to nose without dysmetria or overshoot R/L.   Sensory: intact to light touch in all extremities     Labs  CBC Full  -  ( 06 Oct 2024 05:30 )  WBC Count : 6.85 K/uL  RBC Count : 3.10 M/uL  Hemoglobin : 9.1 g/dL  Hematocrit : 29.3 %  Platelet Count - Automated : 169 K/uL  Mean Cell Volume : 94.5 fl  Mean Cell Hemoglobin : 29.4 pg  Mean Cell Hemoglobin Concentration : 31.1 gm/dL      10-    138  |  102  |  14  ----------------------------<  84  3.8   |  26  |  0.84    Ca    9.0      06 Oct 2024 05:30  Phos  3.8     10-  Mg     1.9     10-06    TPro  6.8  /  Alb  3.9  /  TBili  1.0  /  DBili  x   /  AST  61[H]  /  ALT  29  /  AlkPhos  120  10    PT/INR - ( 06 Oct 2024 05:30 )   PT: 13.4 sec;   INR: 1.17      PTT - ( 04 Oct 2024 15:04 )  PTT:34.7 sec      EEG:  Daily Summary:  10/05-10/06  Occasional ( >1/hr < 1/min) spikes over right temporal region suggestive of underlying epileptic potential

## 2024-10-06 NOTE — EEG REPORT - NS EEG TEXT BOX
[facility] Department of Neurology  Inpatient Continuous video-Electroencephalogram      Patient Name:	VINCENT MARIE    :	-  MRN:	-    Study Start Date/Time:	10/5/2024, 3:35:38 PM  Study End Date/Time:    Brief Clinical History:  VINCENT MARIE is a - old -; study performed to investigate for seizures or markers of epilepsy.     Diagnosis Code:  R56.9 convulsions/seizure    Pertinent Medication:  n/a    Acquisition Details:  Electroencephalography was acquired using a minimum of 21 channels on an [employer] Neurology system v 9.3.1 with electrode placement according to the standard International 10-20 system following ACNS (American Clinical Neurophysiology Society) guidelines.  Anterior temporal T1 and T2 electrodes were utilized whenever possible.  The [employer] automated spike & seizure detections were all reviewed in detail, in addition to the entire raw EEG.    Findings:  Day 1:  10/5/2024, 3:35:38 PM to next morning at 07:00 AM   Background:  continuous, with predominantly alpha and beta frequencies.  Generalized Slowing:  None  Symmetry/Focality: No persistent asymmetries of voltage or frequency.        Voltage:  Normal (20+ uV)  Organization:  Appropriate anterior-posterior gradient  Posterior Dominant Rhythm:  9 Hz symmetric, well-organized, and well-modulated  Sleep:  Symmetric, synchronous spindles and K complexes.  Variability:   Yes		Reactivity:  Yes    Spontaneous Activity:  Occasional ( >1/hr < 1/min) spikes over right temporal region     Events:  1)	No electrographic seizures or significant clinical events occurred during this study.  Provocations:  •	Hyperventilation: was not performed.  •	Photic stimulation: was not performed.  Daily Summary:    1)	Occasional ( >1/hr < 1/min) spikes over right temporal region suggestive of underlying epileptic potential        Dary Flores MD  Attending Neurologist, [facility]

## 2024-10-06 NOTE — PROGRESS NOTE ADULT - PROBLEM SELECTOR PLAN 2
- resume home amlodipine  - Hold Bisoprolol 10 mg (formulary interchange metoprolol xr 200) for bradycardia Home rx: amlodipine 10mg QD, bisoprolol 10mg  - Holding bisoprolol  - Consulted cards on antihypertensive regimen iso Mechanical AV  - c/w amlodipine 10mg QD; consider replacing w/ nifedipine 30 if SBP still elevated (goal: 120)  - Starting hydral 10mg TID.

## 2024-10-06 NOTE — PROGRESS NOTE ADULT - SUBJECTIVE AND OBJECTIVE BOX
*INCOMPLETE*   *INCOMPLETE*  Progress Note  INCOMPLETE NOTE    INTERVAL EVENTS:   No acute events overnight.    SUBJECTIVE:   Patient seen and examined at bedside. Condition largely unchanged from yesterday. No acute complaints at this time.    ROS:  Negative unless otherwise stated above.    PHYSICAL EXAM:  General: Alert and oriented x 3. No acute distress.   HEENT: Moist mucous membranes. Anicteric. No cervical lymphadenopathy.  Cardiovascular: Regular rate and rhythm. No murmur. Normal JVP.  Lungs: Clear to auscultation bilaterally. No accessory muscle use.  Abdomen: Soft, non-tender and non-distended. No palpable masses.  Extremities: No edema. Non-tender. Warm.  Skin: No rashes or lesions.   Neurologic: No apparent focal neurological deficits. CN II-XII grossly intact, but not individually tested.  Psychiatric: Cooperative. Appropriate mood and affect.    VITAL SIGNS:  Vital Signs Last 24 Hrs  T(C): 36.9 (06 Oct 2024 11:28), Max: 37.3 (05 Oct 2024 20:33)  T(F): 98.4 (06 Oct 2024 11:28), Max: 99.1 (05 Oct 2024 20:33)  HR: 61 (06 Oct 2024 11:28) (55 - 61)  BP: 167/78 (06 Oct 2024 11:28) (161/71 - 167/78)  BP(mean): --  RR: 18 (06 Oct 2024 11:28) (18 - 18)  SpO2: 97% (06 Oct 2024 11:28) (97% - 99%)    Parameters below as of 06 Oct 2024 11:28  Patient On (Oxygen Delivery Method): room air      INPATIENT MEDICATIONS:   MEDICATIONS  (STANDING):  amLODIPine   Tablet 10 milliGRAM(s) Oral every 24 hours  aspirin  chewable 81 milliGRAM(s) Oral daily  atorvastatin 20 milliGRAM(s) Oral at bedtime  clindamycin   Capsule 300 milliGRAM(s) Oral every 6 hours  eslicarbazepine 800 milliGRAM(s) Oral at bedtime  lacosamide 200 milliGRAM(s) Oral every 12 hours  multivitamin 1 Tablet(s) Oral daily  pantoprazole    Tablet 40 milliGRAM(s) Oral before breakfast    MEDICATIONS  (PRN):  acetaminophen     Tablet .. 650 milliGRAM(s) Oral every 6 hours PRN Temp greater or equal to 38C (100.4F), Mild Pain (1 - 3)  aluminum hydroxide/magnesium hydroxide/simethicone Suspension 30 milliLiter(s) Oral every 4 hours PRN Dyspepsia  melatonin 3 milliGRAM(s) Oral at bedtime PRN Insomnia  ondansetron Injectable 4 milliGRAM(s) IV Push every 8 hours PRN Nausea and/or Vomiting    HOME MEDICATIONS  amLODIPine 10 mg oral tablet: 1 tab(s) orally once a day  Aspirin Enteric Coated 81 mg oral delayed release tablet: 1 tab(s) orally once a day  atorvastatin 20 mg oral tablet: 1 tab(s) orally once a day (at bedtime)  bisoprolol 10 mg oral tablet: 1 tab(s) orally once a day  lacosamide 200 mg oral tablet: 1 tab(s) orally 2 times a day MDD: 2 tabs  Multiple Vitamins oral tablet: 1 tab(s) orally once a day  pantoprazole 40 mg oral delayed release tablet: 1 tab(s) orally once a day (before a meal)  Toprol- mg oral tablet, extended release: 1 tab(s) orally once a day    ALLERGIES:  Allergies    No Known Allergies    Intolerances    LABS:                       9.1    6.85  )-----------( 169      ( 06 Oct 2024 05:30 )             29.3     10-06    138  |  102  |  14  ----------------------------<  84  3.8   |  26  |  0.84    Ca    9.0      06 Oct 2024 05:30  Phos  3.8     10-06  Mg     1.9     10-06    TPro  6.8  /  Alb  3.9  /  TBili  1.0  /  DBili  x   /  AST  61[H]  /  ALT  29  /  AlkPhos  120  10-06    PT/INR - ( 06 Oct 2024 05:30 )   PT: 13.4 sec;   INR: 1.17          PTT - ( 04 Oct 2024 15:04 )  PTT:34.7 sec  Urinalysis Basic - ( 06 Oct 2024 05:30 )    Color: x / Appearance: x / SG: x / pH: x  Gluc: 84 mg/dL / Ketone: x  / Bili: x / Urobili: x   Blood: x / Protein: x / Nitrite: x   Leuk Esterase: x / RBC: x / WBC x   Sq Epi: x / Non Sq Epi: x / Bacteria: x      CAPILLARY BLOOD GLUCOSE        RADIOLOGY & ADDITIONAL TESTS: Reviewed. Progress Note    INTERVAL EVENTS:   Pt complained of HA and was given tylenol.     SUBJECTIVE:   Patient seen and examined at bedside. Pt states he is doing ok. Still has generalized weakness and lightheadedness but feeling mildly improved. Denies further staring spells. Last BM 2d ago. No complaints at this time.     ROS:  Negative unless otherwise stated above.    PHYSICAL EXAM:  General: Alert and oriented x 3. No acute distress.   HEENT: Moist mucous membranes. Anicteric. VEEG.   Cardiovascular: Regular rate and rhythm. (+) Murmur  Lungs: Clear to auscultation bilaterally. No accessory muscle use.  Abdomen: Soft, non-tender and non-distended. No palpable masses.  Extremities: No edema. Non-tender. Warm.  Neurologic: No apparent focal neurological deficits. CN II-XII grossly intact, but not individually tested.  Psychiatric: Cooperative. Appropriate mood and affect.    VITAL SIGNS:  Vital Signs Last 24 Hrs  T(C): 36.9 (06 Oct 2024 11:28), Max: 37.3 (05 Oct 2024 20:33)  T(F): 98.4 (06 Oct 2024 11:28), Max: 99.1 (05 Oct 2024 20:33)  HR: 61 (06 Oct 2024 11:28) (55 - 61)  BP: 167/78 (06 Oct 2024 11:28) (161/71 - 167/78)  BP(mean): --  RR: 18 (06 Oct 2024 11:28) (18 - 18)  SpO2: 97% (06 Oct 2024 11:28) (97% - 99%)    Parameters below as of 06 Oct 2024 11:28  Patient On (Oxygen Delivery Method): room air      INPATIENT MEDICATIONS:   MEDICATIONS  (STANDING):  amLODIPine   Tablet 10 milliGRAM(s) Oral every 24 hours  aspirin  chewable 81 milliGRAM(s) Oral daily  atorvastatin 20 milliGRAM(s) Oral at bedtime  clindamycin   Capsule 300 milliGRAM(s) Oral every 6 hours  eslicarbazepine 800 milliGRAM(s) Oral at bedtime  lacosamide 200 milliGRAM(s) Oral every 12 hours  multivitamin 1 Tablet(s) Oral daily  pantoprazole    Tablet 40 milliGRAM(s) Oral before breakfast    MEDICATIONS  (PRN):  acetaminophen     Tablet .. 650 milliGRAM(s) Oral every 6 hours PRN Temp greater or equal to 38C (100.4F), Mild Pain (1 - 3)  aluminum hydroxide/magnesium hydroxide/simethicone Suspension 30 milliLiter(s) Oral every 4 hours PRN Dyspepsia  melatonin 3 milliGRAM(s) Oral at bedtime PRN Insomnia  ondansetron Injectable 4 milliGRAM(s) IV Push every 8 hours PRN Nausea and/or Vomiting    HOME MEDICATIONS  amLODIPine 10 mg oral tablet: 1 tab(s) orally once a day  Aspirin Enteric Coated 81 mg oral delayed release tablet: 1 tab(s) orally once a day  atorvastatin 20 mg oral tablet: 1 tab(s) orally once a day (at bedtime)  bisoprolol 10 mg oral tablet: 1 tab(s) orally once a day  lacosamide 200 mg oral tablet: 1 tab(s) orally 2 times a day MDD: 2 tabs  Multiple Vitamins oral tablet: 1 tab(s) orally once a day  pantoprazole 40 mg oral delayed release tablet: 1 tab(s) orally once a day (before a meal)  Toprol- mg oral tablet, extended release: 1 tab(s) orally once a day    ALLERGIES:  Allergies    No Known Allergies    Intolerances    LABS:                       9.1    6.85  )-----------( 169      ( 06 Oct 2024 05:30 )             29.3     10-06    138  |  102  |  14  ----------------------------<  84  3.8   |  26  |  0.84    Ca    9.0      06 Oct 2024 05:30  Phos  3.8     10-06  Mg     1.9     10-06    TPro  6.8  /  Alb  3.9  /  TBili  1.0  /  DBili  x   /  AST  61[H]  /  ALT  29  /  AlkPhos  120  10-06    PT/INR - ( 06 Oct 2024 05:30 )   PT: 13.4 sec;   INR: 1.17          PTT - ( 04 Oct 2024 15:04 )  PTT:34.7 sec  Urinalysis Basic - ( 06 Oct 2024 05:30 )    Color: x / Appearance: x / SG: x / pH: x  Gluc: 84 mg/dL / Ketone: x  / Bili: x / Urobili: x   Blood: x / Protein: x / Nitrite: x   Leuk Esterase: x / RBC: x / WBC x   Sq Epi: x / Non Sq Epi: x / Bacteria: x      CAPILLARY BLOOD GLUCOSE        RADIOLOGY & ADDITIONAL TESTS: Reviewed.

## 2024-10-06 NOTE — CONSULT NOTE ADULT - SUBJECTIVE AND OBJECTIVE BOX
HPI:  54y Male with PMHx of hemorrhagic pancreatitis (followed by Dr. Solano), Type A aortic dissection s/p Dacron Grafting and AV resuspension (2013) and total arch replacement, AVR (23 mm), CABG x 1 (SVG-RCA) and aorta-axillary bypass graft (2023), PE (s/p IVC now removed 9/2024), HTN, seizure disorder, recent dental procedure (10/1) on clindamycin (x 2 weeks) who presented to [facility] on 10/5 who was sent in for anemia (hgb 8 on outpatient labs). Reports he has been feeling weak and lightheaded for about 4 months and that his hemoglobin has been downtrending.   In Ed VSS, hgb 5.9. EKG sinus bradycardiac. Given 1 U prbc with appropriate response (7.9). Admitted to medicine for anemia workup. Anemia workup revealed hemolysis -  marked schistocytes, elevated LDH to 1039, and haptoglobin < 10. INR elevated to 1.7, PTT wnl. UA + blood and RBCs.  CTS consulted. Concern for AV dysfunction and hemolysis, pending TTE. Course c/b seizure episode 10/5 iso missed lacosamide dose.  Cardiology consulted for BP management.     Patient seen at the bedside c/o headache since seizure episodes. Feels similar to prior. Denies chest pain, palpitations, sob, focal weakness, changes in vision, slurred speech, change in headache from baseline. Usually takes tylenol, mortrin, or Excedrin for headache.   Patient and wife note he has had bradycardia to 50s which is not new. He was on metoprolol originally but was switched to bisoprolol outpatient. Takes amlodipine 10mg and bisoporlol 10mg.     ROS: Per HPI    PAST MEDICAL & SURGICAL HISTORY:  HTN (hypertension)      Aortic dissection      CAD (coronary artery disease)      Seizure disorder      Seizure disorder      Hypertension      Status post endovascular aneurysm repair (EVAR)      Pancreatitis  hospitalized for 5 months per spouse      DVT, lower extremity      Pulmonary embolism      S/P aortic bifurcation bypass graft      S/P CABG x 1      Elective surgery  IVC filter        SOCIAL HISTORY:  FAMILY HISTORY:  No pertinent family history in first degree relatives      ALLERGIES: 	  No Known Allergies          MEDICATIONS:  acetaminophen     Tablet .. 650 milliGRAM(s) Oral every 6 hours PRN  aluminum hydroxide/magnesium hydroxide/simethicone Suspension 30 milliLiter(s) Oral every 4 hours PRN  amLODIPine   Tablet 10 milliGRAM(s) Oral every 24 hours  aspirin  chewable 81 milliGRAM(s) Oral daily  atorvastatin 20 milliGRAM(s) Oral at bedtime  clindamycin   Capsule 300 milliGRAM(s) Oral every 6 hours  eslicarbazepine 800 milliGRAM(s) Oral at bedtime  lacosamide 200 milliGRAM(s) Oral every 12 hours  melatonin 3 milliGRAM(s) Oral at bedtime PRN  multivitamin 1 Tablet(s) Oral daily  ondansetron Injectable 4 milliGRAM(s) IV Push every 8 hours PRN  pantoprazole    Tablet 40 milliGRAM(s) Oral before breakfast      T(C): 37.2 (10-06-24 @ 17:24), Max: 37.3 (10-05-24 @ 20:33)  HR: 57 (10-06-24 @ 17:24) (55 - 61)  BP: 149/73 (10-06-24 @ 17:24) (149/73 - 167/78)  RR: 18 (10-06-24 @ 17:24) (18 - 18)  SpO2: 98% (10-06-24 @ 17:24) (97% - 99%)      PHYSICAL EXAM:  NAD  rrr +systolic murmur RSB  CTAB  Soft ntnd   no le edema, wwp   cn 2 - 12 grossly intact, strength 5/5 throughout, sensation intact      I&O's Summary      LABS:	 	                        9.1    6.85  )-----------( 169      ( 06 Oct 2024 05:30 )             29.3     10-06    138  |  102  |  14  ----------------------------<  84  3.8   |  26  |  0.84    Ca    9.0      06 Oct 2024 05:30  Phos  3.8     10-06  Mg     1.9     10-06    TPro  6.8  /  Alb  3.9  /  TBili  1.0  /  DBili  x   /  AST  61[H]  /  ALT  29  /  AlkPhos  120  10-06        proBNP:   Lipid Profile:   HgA1c:   TSH:     TELEMETRY: 	    ECG:  	  RADIOLOGY:   ECHO:  STRESS:  CATH:   HPI:  54y Male with PMHx of hemorrhagic pancreatitis (followed by Dr. Solano), Type A aortic dissection s/p Dacron Grafting and AV resuspension (2013) and total arch replacement, AVR (23 mm), CABG x 1 (SVG-RCA) and aorta-axillary bypass graft (2023), PE (s/p IVC now removed 9/2024), HTN, seizure disorder, recent dental procedure (10/1) on clindamycin (x 2 weeks) who presented to [facility] on 10/5  for anemia (hgb 8 on outpatient labs). Reports he has been feeling weak and lightheaded for about 4 months and that his hemoglobin has been downtrending.   In Ed VSS, hgb 5.9. EKG sinus bradycardiac. Given 1 U prbc with appropriate response (7.9). Admitted to medicine for anemia workup. Anemia workup revealed hemolysis -  marked schistocytes, elevated LDH to 1039, and haptoglobin < 10. INR elevated to 1.7, PTT wnl. UA + blood and RBCs.  CTS consulted. Concern for AV dysfunction and hemolysis, pending TTE. Course c/b seizure episode 10/5 iso missed lacosamide dose.  Cardiology consulted for BP management.     Patient seen at the bedside c/o headache since seizure episodes. Feels similar to prior. Denies chest pain, palpitations, sob, focal weakness, changes in vision, slurred speech, change in headache from baseline. Usually takes tylenol, mortrin, or Excedrin for headache.   Patient and wife note he has had bradycardia to 50s which is not new. He was on metoprolol originally but was switched to bisoprolol outpatient. Takes amlodipine 10mg and bisoporlol 10mg.     ROS: Per HPI    PAST MEDICAL & SURGICAL HISTORY:  HTN (hypertension)      Aortic dissection      CAD (coronary artery disease)      Seizure disorder      Seizure disorder      Hypertension      Status post endovascular aneurysm repair (EVAR)      Pancreatitis  hospitalized for 5 months per spouse      DVT, lower extremity      Pulmonary embolism      S/P aortic bifurcation bypass graft      S/P CABG x 1      Elective surgery  IVC filter        SOCIAL HISTORY:  FAMILY HISTORY:  No pertinent family history in first degree relatives      ALLERGIES: 	  No Known Allergies          MEDICATIONS:  acetaminophen     Tablet .. 650 milliGRAM(s) Oral every 6 hours PRN  aluminum hydroxide/magnesium hydroxide/simethicone Suspension 30 milliLiter(s) Oral every 4 hours PRN  amLODIPine   Tablet 10 milliGRAM(s) Oral every 24 hours  aspirin  chewable 81 milliGRAM(s) Oral daily  atorvastatin 20 milliGRAM(s) Oral at bedtime  clindamycin   Capsule 300 milliGRAM(s) Oral every 6 hours  eslicarbazepine 800 milliGRAM(s) Oral at bedtime  lacosamide 200 milliGRAM(s) Oral every 12 hours  melatonin 3 milliGRAM(s) Oral at bedtime PRN  multivitamin 1 Tablet(s) Oral daily  ondansetron Injectable 4 milliGRAM(s) IV Push every 8 hours PRN  pantoprazole    Tablet 40 milliGRAM(s) Oral before breakfast      T(C): 37.2 (10-06-24 @ 17:24), Max: 37.3 (10-05-24 @ 20:33)  HR: 57 (10-06-24 @ 17:24) (55 - 61)  BP: 149/73 (10-06-24 @ 17:24) (149/73 - 167/78)  RR: 18 (10-06-24 @ 17:24) (18 - 18)  SpO2: 98% (10-06-24 @ 17:24) (97% - 99%)      PHYSICAL EXAM:  NAD  rrr +systolic murmur RSB  CTAB  Soft ntnd   no le edema, wwp   cn 2 - 12 grossly intact, strength 5/5 throughout, sensation intact      I&O's Summary      LABS:	 	                        9.1    6.85  )-----------( 169      ( 06 Oct 2024 05:30 )             29.3     10-06    138  |  102  |  14  ----------------------------<  84  3.8   |  26  |  0.84    Ca    9.0      06 Oct 2024 05:30  Phos  3.8     10-06  Mg     1.9     10-06    TPro  6.8  /  Alb  3.9  /  TBili  1.0  /  DBili  x   /  AST  61[H]  /  ALT  29  /  AlkPhos  120  10-06        proBNP:   Lipid Profile:   HgA1c:   TSH:     TELEMETRY: 	    ECG:  	  RADIOLOGY:   ECHO:  STRESS:  CATH:

## 2024-10-06 NOTE — CONSULT NOTE ADULT - ASSESSMENT
Review of studies:  Doppler LLE 8/20/24: no LLE DVT   TTE 6/10/2024: LVEF 70%, moderate LVH, G2DD, LA severely dilated, RA moderately dilatedbioprosthetic aortic valve – well seated with normal function, echogenic graft material noted in prox ascending aorta c/w known collins-arch replacement, mild MR, mild to moderate TR   CTA chest aorta 4/2024: Status post aortic valve replacement and graft replacement of ascending aorta and portion of aortic arch with TEVAR of the descending aorta as seen on prior studies. Decreased size of aneurysm of the descending aorta now measuring 5 cm. No endoleak. Nonocclusive dissection flap again noted in the right subclavian artery and right common carotid artery. Patent bypass graft of the left subclavian artery. 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      Cardiologist: Vivi last seen 7/2024.      Home meds: aspirin 81mg, amlodipine 10mg qd, atoravastatin 20mg, bisoprolol 10mg qd, bumex 2mg qd, hctz 12.5mg qd, lacosamide 200mg qd, protonix 40mg po qd  54y Male with PMHx of hemorrhagic pancreatitis (followed by Dr. Solano), Type A aortic dissection s/p Dacron Grafting and AV resuspension (2013) and total arch replacement, AVR (23-mm Inspiris [employer] biological valve), CABG x 1 (SVG-RCA) and aorta-axillary bypass graft (2023), PE (s/p IVC now removed 9/2024), HTN, seizure disorder, recent dental procedure (10/1) on clindamycin (x 2 weeks) who presented to [facility] on 10/5 for anemia (hgb 8 on outpatient labs). Found to have hemolytic anemia, admitted to medicine for further workup. CTS following for possible AVR dysfunction. Cardiology consulted for same and HTN management.     Doppler LLE 8/20/24: no LLE DVT   EKG 10/4/24: sinus bradycardia  TTE 6/10/2024: LVEF 70%, moderate LVH, G2DD, LA severely dilated, RA moderately dilated bioprosthetic aortic valve – well seated with normal function, echogenic graft material noted in prox ascending aorta c/w known collins-arch replacement, mild MR, mild to moderate TR   CTA chest aorta 4/2024: Status post aortic valve replacement and graft replacement of ascending aorta and portion of aortic arch with TEVAR of the descending aorta as seen on prior studies. Decreased size of aneurysm of the descending aorta now measuring 5 cm. No endoleak. Nonocclusive dissection flap again noted in the right subclavian artery and right common carotid artery. Patent bypass graft of the left subclavian artery. 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      Cardiologist: Vivi last seen 7/2024.    Home meds: aspirin 81mg, amlodipine 10mg qd, atorvastatin 20mg, bisoprolol 10mg qd, bumex 2mg qd, hctz 12.5mg qd, lacosamide 200mg qd, protonix 40mg po qd     #Hemolytic anemia c/f bioprosthetic AVR dysfunction  - s/p 1 U PRBC, hgb stable  - F/u TTE; if unable to visualize AVR well may need BARBIE   - repeat hemolysis labs in am    #S/p Type A aortic dissection s/p Dacron Grafting and AV resuspension (2013) and total arch replacement, AVR (23-mm Inspiris [employer] biological valve  #HTN  #sinus bradycardia  - s/p amlodipine 10mg this am  - start hydralazine 10mg TID for SBP goal 120 - 130  - if BP still elevated tomorrow AM, would switch amlodipine to nifedipine 30mg po qd  - BB held iso sinus bradycardia    #CABG x 1 (SVG-RCA) and aorta-axillary bypass graft (2023)  - c/w asa 81mg   - c/w atorvastatin 20mg       Case to be discussed with attending. Please see attending attestation for final recommendations.     Monique Rubi   Cardiology fellow

## 2024-10-06 NOTE — PROGRESS NOTE ADULT - PROBLEM SELECTOR PLAN 3
History of epilepsy. Admitted for management of seizures Feb 2024. Follows outpt neurologist Dr Delilah Hernandez office ( [phone]). Had short several second episode of blank staring on 10/5.  - vEEG  - Neuro consult  -  home lacosamide 200 q12h History of epilepsy. Admitted for management of seizures Feb 2024. Follows outpt neurologist Dr Delilah Hernandez office ( [phone]). Had short several second episode of blank staring on 10/5.  - vEEG  - Epilepsy consulted  - Cw home lacosamide 200 q12h (8AM,8PM), eslicarbazepine 800mg QD (8pm)  - ensure pt taking meds

## 2024-10-07 ENCOUNTER — RESULT REVIEW (OUTPATIENT)
Age: 54
End: 2024-10-07

## 2024-10-07 ENCOUNTER — TRANSCRIPTION ENCOUNTER (OUTPATIENT)
Age: 54
End: 2024-10-07

## 2024-10-07 DIAGNOSIS — I16.0 HYPERTENSIVE URGENCY: ICD-10-CM

## 2024-10-07 DIAGNOSIS — D58.9 HEREDITARY HEMOLYTIC ANEMIA, UNSPECIFIED: ICD-10-CM

## 2024-10-07 DIAGNOSIS — I34.89 OTHER NONRHEUMATIC MITRAL VALVE DISORDERS: ICD-10-CM

## 2024-10-07 DIAGNOSIS — D59.4 OTHER NONAUTOIMMUNE HEMOLYTIC ANEMIAS: ICD-10-CM

## 2024-10-07 LAB
ALBUMIN SERPL ELPH-MCNC: 3.6 G/DL — SIGNIFICANT CHANGE UP (ref 3.3–5)
ALP SERPL-CCNC: 114 U/L — SIGNIFICANT CHANGE UP (ref 40–120)
ALT FLD-CCNC: 26 U/L — SIGNIFICANT CHANGE UP (ref 10–45)
ANION GAP SERPL CALC-SCNC: 9 MMOL/L — SIGNIFICANT CHANGE UP (ref 5–17)
AST SERPL-CCNC: 64 U/L — HIGH (ref 10–40)
BASOPHILS # BLD AUTO: 0.06 K/UL — SIGNIFICANT CHANGE UP (ref 0–0.2)
BASOPHILS NFR BLD AUTO: 0.9 % — SIGNIFICANT CHANGE UP (ref 0–2)
BILIRUB SERPL-MCNC: 1 MG/DL — SIGNIFICANT CHANGE UP (ref 0.2–1.2)
BUN SERPL-MCNC: 14 MG/DL — SIGNIFICANT CHANGE UP (ref 7–23)
CALCIUM SERPL-MCNC: 8.8 MG/DL — SIGNIFICANT CHANGE UP (ref 8.4–10.5)
CHLORIDE SERPL-SCNC: 104 MMOL/L — SIGNIFICANT CHANGE UP (ref 96–108)
CO2 SERPL-SCNC: 26 MMOL/L — SIGNIFICANT CHANGE UP (ref 22–31)
CREAT SERPL-MCNC: 0.86 MG/DL — SIGNIFICANT CHANGE UP (ref 0.5–1.3)
DAT POLY-SP REAG RBC QL: NEGATIVE — SIGNIFICANT CHANGE UP
EGFR: 102 ML/MIN/1.73M2 — SIGNIFICANT CHANGE UP
EOSINOPHIL # BLD AUTO: 0.17 K/UL — SIGNIFICANT CHANGE UP (ref 0–0.5)
EOSINOPHIL NFR BLD AUTO: 2.7 % — SIGNIFICANT CHANGE UP (ref 0–6)
FERRITIN SERPL-MCNC: 793 NG/ML — HIGH (ref 30–400)
GLUCOSE SERPL-MCNC: 76 MG/DL — SIGNIFICANT CHANGE UP (ref 70–99)
HCT VFR BLD CALC: 29.2 % — LOW (ref 39–50)
HGB BLD-MCNC: 9 G/DL — LOW (ref 13–17)
IMM GRANULOCYTES NFR BLD AUTO: 0.3 % — SIGNIFICANT CHANGE UP (ref 0–0.9)
IRON SATN MFR SERPL: 29 % — SIGNIFICANT CHANGE UP (ref 16–55)
IRON SATN MFR SERPL: 46 UG/DL — SIGNIFICANT CHANGE UP (ref 45–165)
LYMPHOCYTES # BLD AUTO: 1.73 K/UL — SIGNIFICANT CHANGE UP (ref 1–3.3)
LYMPHOCYTES # BLD AUTO: 27.3 % — SIGNIFICANT CHANGE UP (ref 13–44)
MAGNESIUM SERPL-MCNC: 2 MG/DL — SIGNIFICANT CHANGE UP (ref 1.6–2.6)
MCHC RBC-ENTMCNC: 30 PG — SIGNIFICANT CHANGE UP (ref 27–34)
MCHC RBC-ENTMCNC: 30.8 GM/DL — LOW (ref 32–36)
MCV RBC AUTO: 97.3 FL — SIGNIFICANT CHANGE UP (ref 80–100)
MONOCYTES # BLD AUTO: 0.77 K/UL — SIGNIFICANT CHANGE UP (ref 0–0.9)
MONOCYTES NFR BLD AUTO: 12.1 % — SIGNIFICANT CHANGE UP (ref 2–14)
NEUTROPHILS # BLD AUTO: 3.59 K/UL — SIGNIFICANT CHANGE UP (ref 1.8–7.4)
NEUTROPHILS NFR BLD AUTO: 56.7 % — SIGNIFICANT CHANGE UP (ref 43–77)
NRBC # BLD: 0 /100 WBCS — SIGNIFICANT CHANGE UP (ref 0–0)
PHOSPHATE SERPL-MCNC: 3.6 MG/DL — SIGNIFICANT CHANGE UP (ref 2.5–4.5)
PLATELET # BLD AUTO: 232 K/UL — SIGNIFICANT CHANGE UP (ref 150–400)
POTASSIUM SERPL-MCNC: 3.8 MMOL/L — SIGNIFICANT CHANGE UP (ref 3.5–5.3)
POTASSIUM SERPL-SCNC: 3.8 MMOL/L — SIGNIFICANT CHANGE UP (ref 3.5–5.3)
PROT SERPL-MCNC: 6.4 G/DL — SIGNIFICANT CHANGE UP (ref 6–8.3)
RBC # BLD: 3 M/UL — LOW (ref 4.2–5.8)
RBC # FLD: 23.6 % — HIGH (ref 10.3–14.5)
SODIUM SERPL-SCNC: 139 MMOL/L — SIGNIFICANT CHANGE UP (ref 135–145)
TIBC SERPL-MCNC: 161 UG/DL — LOW (ref 220–430)
TRANSFERRIN SERPL-MCNC: 124 MG/DL — LOW (ref 200–360)
UIBC SERPL-MCNC: 115 UG/DL — SIGNIFICANT CHANGE UP (ref 110–370)
WBC # BLD: 6.34 K/UL — SIGNIFICANT CHANGE UP (ref 3.8–10.5)
WBC # FLD AUTO: 6.34 K/UL — SIGNIFICANT CHANGE UP (ref 3.8–10.5)

## 2024-10-07 PROCEDURE — 99222 1ST HOSP IP/OBS MODERATE 55: CPT

## 2024-10-07 PROCEDURE — 95720 EEG PHY/QHP EA INCR W/VEEG: CPT

## 2024-10-07 PROCEDURE — 93306 TTE W/DOPPLER COMPLETE: CPT | Mod: 26

## 2024-10-07 PROCEDURE — 99232 SBSQ HOSP IP/OBS MODERATE 35: CPT

## 2024-10-07 PROCEDURE — 99233 SBSQ HOSP IP/OBS HIGH 50: CPT | Mod: GC

## 2024-10-07 RX ORDER — HYDRALAZINE HYDROCHLORIDE 100 MG/1
25 TABLET ORAL EVERY 8 HOURS
Refills: 0 | Status: DISCONTINUED | OUTPATIENT
Start: 2024-10-07 | End: 2024-10-08

## 2024-10-07 RX ORDER — HYDRALAZINE HYDROCHLORIDE 100 MG/1
25 TABLET ORAL EVERY 8 HOURS
Refills: 0 | Status: DISCONTINUED | OUTPATIENT
Start: 2024-10-07 | End: 2024-10-07

## 2024-10-07 RX ORDER — HYDRALAZINE HYDROCHLORIDE 100 MG/1
10 TABLET ORAL EVERY 8 HOURS
Refills: 0 | Status: DISCONTINUED | OUTPATIENT
Start: 2024-10-07 | End: 2024-10-07

## 2024-10-07 RX ORDER — HYDRALAZINE HYDROCHLORIDE 100 MG/1
15 TABLET ORAL ONCE
Refills: 0 | Status: COMPLETED | OUTPATIENT
Start: 2024-10-07 | End: 2024-10-07

## 2024-10-07 RX ORDER — ESLICARBAZEPINE ACETATE 600 MG/1
800 TABLET ORAL AT BEDTIME
Refills: 0 | Status: DISCONTINUED | OUTPATIENT
Start: 2024-10-07 | End: 2024-10-08

## 2024-10-07 RX ADMIN — Medication 30 MILLILITER(S): at 22:10

## 2024-10-07 RX ADMIN — Medication 81 MILLIGRAM(S): at 13:53

## 2024-10-07 RX ADMIN — CLINDAMYCIN PHOSPHATE 300 MILLIGRAM(S): 150 INJECTION, SOLUTION INTRAVENOUS at 21:30

## 2024-10-07 RX ADMIN — Medication 30 MILLIGRAM(S): at 07:18

## 2024-10-07 RX ADMIN — Medication 975 MILLIGRAM(S): at 14:53

## 2024-10-07 RX ADMIN — ATORVASTATIN CALCIUM 20 MILLIGRAM(S): 10 TABLET, FILM COATED ORAL at 21:29

## 2024-10-07 RX ADMIN — Medication 20 MILLIEQUIVALENT(S): at 21:29

## 2024-10-07 RX ADMIN — HYDRALAZINE HYDROCHLORIDE 25 MILLIGRAM(S): 100 TABLET ORAL at 21:29

## 2024-10-07 RX ADMIN — Medication 400 MILLIGRAM(S): at 03:41

## 2024-10-07 RX ADMIN — LACOSAMIDE 200 MILLIGRAM(S): 10 SOLUTION ORAL at 07:41

## 2024-10-07 RX ADMIN — ESLICARBAZEPINE ACETATE 800 MILLIGRAM(S): 600 TABLET ORAL at 22:04

## 2024-10-07 RX ADMIN — HYDRALAZINE HYDROCHLORIDE 10 MILLIGRAM(S): 100 TABLET ORAL at 13:53

## 2024-10-07 RX ADMIN — Medication 650 MILLIGRAM(S): at 00:27

## 2024-10-07 RX ADMIN — Medication 400 MILLIGRAM(S): at 07:18

## 2024-10-07 RX ADMIN — HYDRALAZINE HYDROCHLORIDE 10 MILLIGRAM(S): 100 TABLET ORAL at 06:31

## 2024-10-07 RX ADMIN — MULTI VITAMIN/MINERAL SUPPLEMENT WITH ASCORBIC ACID, NIACIN, PYRIDOXINE, PANTOTHENIC ACID, FOLIC ACID, RIBOFLAVIN, THIAMIN, BIOTIN, COBALAMIN AND ZINC. 1 TABLET(S): 60; 20; 12.5; 10; 10; 1.7; 1.5; 1; .3; .006 TABLET, COATED ORAL at 13:53

## 2024-10-07 RX ADMIN — CLINDAMYCIN PHOSPHATE 300 MILLIGRAM(S): 150 INJECTION, SOLUTION INTRAVENOUS at 13:53

## 2024-10-07 RX ADMIN — Medication 975 MILLIGRAM(S): at 22:03

## 2024-10-07 RX ADMIN — Medication 650 MILLIGRAM(S): at 01:27

## 2024-10-07 RX ADMIN — CLINDAMYCIN PHOSPHATE 300 MILLIGRAM(S): 150 INJECTION, SOLUTION INTRAVENOUS at 06:31

## 2024-10-07 RX ADMIN — HYDRALAZINE HYDROCHLORIDE 15 MILLIGRAM(S): 100 TABLET ORAL at 15:16

## 2024-10-07 RX ADMIN — LACOSAMIDE 200 MILLIGRAM(S): 10 SOLUTION ORAL at 22:03

## 2024-10-07 RX ADMIN — Medication 400 MILLIGRAM(S): at 04:41

## 2024-10-07 RX ADMIN — Medication 975 MILLIGRAM(S): at 13:53

## 2024-10-07 NOTE — PROGRESS NOTE ADULT - ASSESSMENT
54y Male with PMHx of hemorrhagic pancreatitis (followed by Dr. Solano), Type A aortic dissection s/p Dacron Grafting and AV resuspension (2013) and total arch replacement, AVR (23 mm), CABG x 1 (SVG-RCA) and aorta-axillary bypass graft (2023), PE (s/p IVC now removed 9/2024), HTN, seizure disorder who presented to [facility] on 10/5 after outpatient labs showed hemoglobin of 8, with repeat in ED showing 5.9. Patine reports he has been feeling weak and lightheaded for about four months. He was given 1U of PRBC and admitted to medicine service for management/work-up of anemia. Labs showing schistocytes and elevated LDH with concerns for hemolytic anemia. CTS was consulted given concern for bioprosthetic valve causing hemolytic anemia.      Plan     Problem 1: Concern for AV Dysfunction   - most recent echo in June 2024 showed well seated valve with normal function   - Pending repeat TTE today  - Plan to review TTE with Dr. Pierre     Problem 2:  Anemia   - appreciate heme/onc consult  - concern for hemolytic anemia - recommend trending ldh/haptoglobin   - tte pending to evaluate aortic valve     Problem 3: Type A Dissection   - recommending normotension   - BP management per cardiology and medicine     Problem 4: Seizure Disorder  - continue anti-seizure medication   - remainder of care per primary team  54y Male with PMHx of hemorrhagic pancreatitis (followed by Dr. Solano), Type A aortic dissection s/p Dacron Grafting and AV resuspension (2013) and total arch replacement, AVR (23 mm), CABG x 1 (SVG-RCA) and aorta-axillary bypass graft (2023), PE (s/p IVC now removed 9/2024), HTN, seizure disorder who presented to [facility] on 10/5 after outpatient labs showed hemoglobin of 8, with repeat in ED showing 5.9. Patine reports he has been feeling weak and lightheaded for about four months. He was given 1U of PRBC and admitted to medicine service for management/work-up of anemia. Labs showing schistocytes and elevated LDH with concerns for hemolytic anemia. CTS was consulted given concern for bioprosthetic valve causing hemolytic anemia.      Plan     Problem 1: Concern for AV Dysfunction   - most recent echo in June 2024 showed well seated valve with normal function   - Repeat TTE today (findings above)  - Findings discussed with Dr. Pierre, stable for d/c with plan to f/u as an outpatient in 6 weeks with Dr. Pierre with repeat TTE.  - Patient can call [phone] to confirm f/u appointment day and time     Problem 2:  Anemia   - appreciate heme/onc consult  - concern for hemolytic anemia - recommend trending ldh/haptoglobin   - tte pending to evaluate aortic valve     Problem 3: Type A Dissection   - recommending normotension   - BP management per cardiology and medicine     Problem 4: Seizure Disorder  - continue anti-seizure medication   - remainder of care per primary team  54y Male with PMHx of hemorrhagic pancreatitis (followed by Dr. Solano), Type A aortic dissection s/p Dacron Grafting and AV resuspension (2013) and total arch replacement, AVR (23 mm), CABG x 1 (SVG-RCA) and aorta-axillary bypass graft (2023), PE (s/p IVC now removed 9/2024), HTN, seizure disorder who presented to [facility] on 10/5 after outpatient labs showed hemoglobin of 8, with repeat in ED showing 5.9. Patine reports he has been feeling weak and lightheaded for about four months. He was given 1U of PRBC and admitted to medicine service for management/work-up of anemia. Labs showing schistocytes and elevated LDH with concerns for hemolytic anemia. CTS was consulted given concern for bioprosthetic valve causing hemolytic anemia.      Plan     Problem 1: Concern for AV Dysfunction   - most recent echo in June 2024 showed well seated valve with normal function   - Repeat TTE today (findings above)  - Findings discussed with Dr. Pierre, stable for d/c with plan to f/u as an outpatient in 6 weeks with Dr. Pierre with repeat TTE.  - Outpatient appointment: 11/20/24 at 11.30am, [address] [place] [address], tel [phone]    Problem 2:  Anemia   - appreciate heme/onc consult  - concern for hemolytic anemia - recommend trending ldh/haptoglobin   - tte pending to evaluate aortic valve     Problem 3: Type A Dissection   - recommending normotension   - BP management per cardiology and medicine     Problem 4: Seizure Disorder  - continue anti-seizure medication   - remainder of care per primary team

## 2024-10-07 NOTE — PROGRESS NOTE ADULT - PROBLEM SELECTOR PLAN 3
Bioprosthetic AVR 3/2023 w/ Dr. Pierre  - concern for hemolytic anemia 2/2 AVR  - TTE (10/7/24): LVEF 65%, mod concentric LVH, normal RV size and systolic function. severely dilated LA. Bioprosthetic valve in aortic position. Peak transvalvular velocity 3.59 m/s, mean transvalvular gradient 25.00 mmHg, LVOT/AV velocity ratio is 0.49. The peak transaortic gradient is 51.55 mmHg. no evidence of aortic regurgitation. Chordal SHAJI present w/o LVOT obstruction. Probable moderate MR. Moderate TR. pHTN, PASP 54. Compared to the previous TTE performed on 6/14/2023, the transaortic gradients are higher on the current study. Apical aneurysm is not appreciated on the current study. Bioprosthetic AVR 3/2023 w/ Dr. Pierre  - concern for hemolytic anemia 2/2 AVR  - TTE (10/7/24): LVEF 65%, mod concentric LVH, normal RV size and systolic function. severely dilated LA. Bioprosthetic valve in aortic position. Peak transvalvular velocity 3.59 m/s, mean transvalvular gradient 25.00 mmHg, LVOT/AV velocity ratio is 0.49. The peak transaortic gradient is 51.55 mmHg. no evidence of AR. Chordal SHAJI present w/o LVOT obstruction. Probable moderate MR. Moderate TR. pHTN, PASP 54. Compared to the previous TTE performed on 6/14/2023, the transaortic gradients are higher on the current study. Apical aneurysm is not appreciated on the current study  - Plan for BARBIE and cMRI in AM to further assess AVR

## 2024-10-07 NOTE — PROGRESS NOTE ADULT - SUBJECTIVE AND OBJECTIVE BOX
Cardiology PA Adult Progress Note  --Transfer from medicine to cardiac tele--    Hospital course:     SUBJECTIVE ASSESSMENT:   	  MEDICATIONS:  hydrALAZINE 25 milliGRAM(s) Oral every 8 hours  NIFEdipine XL 30 milliGRAM(s) Oral every 24 hours  clindamycin   Capsule 300 milliGRAM(s) Oral every 6 hours  acetaminophen     Tablet .. 975 milliGRAM(s) Oral every 8 hours PRN  eslicarbazepine 800 milliGRAM(s) Oral daily  lacosamide 200 milliGRAM(s) Oral every 12 hours  melatonin 3 milliGRAM(s) Oral at bedtime PRN  ondansetron Injectable 4 milliGRAM(s) IV Push every 8 hours PRN  aluminum hydroxide/magnesium hydroxide/simethicone Suspension 30 milliLiter(s) Oral every 4 hours PRN  pantoprazole    Tablet 40 milliGRAM(s) Oral before breakfast  atorvastatin 20 milliGRAM(s) Oral at bedtime  aspirin  chewable 81 milliGRAM(s) Oral daily  multivitamin 1 Tablet(s) Oral daily  	  VITAL SIGNS:  T(C): 37.3 (10-07-24 @ 13:50), Max: 37.3 (10-07-24 @ 13:50)  HR: 56 (10-07-24 @ 15:10) (56 - 65)  BP: 162/84 (10-07-24 @ 15:10) (146/80 - 207/86)  RR: 18 (10-07-24 @ 13:50) (18 - 18)  SpO2: 99% (10-07-24 @ 13:50) (98% - 99%)  Wt(kg): --    I&O's Summary                                   PHYSICAL EXAM:  Appearance: Normal	  HEENT: Normal oral mucosa, PERRL, EOMI	  Neck: Supple, - JVD; no Carotid Bruit   Cardiovascular: Normal S1 S2, + systolic murmur  Respiratory: Lungs clear to auscultation/No Rales, Rhonchi, Wheezing	  Gastrointestinal:  Soft, Non-tender, + BS	  Skin: No rashes, No ecchymoses, No cyanosis  Extremities: Normal range of motion, No clubbing, cyanosis or edema. Soft mass near healed incision site on L thigh (per pt is improving, was harder before)  Vascular: Peripheral pulses palpable 2+ bilaterally  Neurologic: Non-focal  Psychiatry: A & O x 3, Mood & affect appropriate    LABS:	 	                        9.0    6.34  )-----------( 232      ( 07 Oct 2024 05:30 )             29.2     10-07    139  |  104  |  14  ----------------------------<  76  3.8   |  26  |  0.86    Ca    8.8      07 Oct 2024 05:30  Phos  3.6     10-07  Mg     2.0     10-07    TPro  6.4  /  Alb  3.6  /  TBili  1.0  /  DBili  x   /  AST  64[H]  /  ALT  26  /  AlkPhos  114  10-07    proBNP:   Lipid Profile:   HgA1c:   TSH:   PT/INR - ( 06 Oct 2024 05:30 )   PT: 13.4 sec;   INR: 1.17           Cardiology PA Adult Progress Note  --Transfer from medicine to cardiac tele--    Hospital course: 53 y/o M w/ PMHx of Type A aortic dissection s/p Dacron Grafting and AV resuspension (2013) and total arch replacement, bioprosthetic AVR (23 mm), CABG x 1 (SVG-RCA, 2023) and aorta-axillary bypass graft (2023), PE (s/p IVC now removed 9/2024), HTN, hemorrhagic pancreatitis (followed by Dr. Solano), seizure disorder who presented to [facility] on 10/5 after outpatient labs showed hemoglobin of 8, with repeat in ED showing 5.9. Patient reported weakness and lightheadedness x 4 months. He was given 1 unit of pRBC and admitted to medicine service for management/work-up of anemia. Labs showing schistocytes and elevated LDH with concerns for hemolytic anemia. Medicine following for anemia, CTS was consulted given concern for bioprosthetic valve causing hemolytic anemia. TTE done 10/7, showing LVEF 65%, mod concentric LVH, severely dilated LA, LVOT/AV velocity ratio 0.49, chordal SHAJI present w/o LVOT obstruction, pHTN, PASP 54. Plan for BARBIE in AM to further assess bioprosthetic valve, cMRI tonight to work-up possible HOCM given SHAJI on TTE. Patient transferred to cardiac tele for continued BP management while pending results of cMRI and BARBIE.    SUBJECTIVE ASSESSMENT: Patient seen and examined at bedside, he was laying comfortably in bed. He is feeling well and currently denies any CP, HA, SOB, dizziness, palpitations, vision changes. He reports a history of headaches however states that he had a severe headache over the last two days that alleviated after taking a medication his wife gave him (not from the pharmacy, he does not know the name of the med). Patient advised to only take medications from the hospital pharmacy as there may be interactions with his current medication regimen. He also reported an episode of squeezing CP that lasted for 1 hour last night and alleviated after 1 dose of tylenol 975 mg, no recurrence of CP.    	  MEDICATIONS:  hydrALAZINE 25 milliGRAM(s) Oral every 8 hours  NIFEdipine XL 30 milliGRAM(s) Oral every 24 hours  clindamycin   Capsule 300 milliGRAM(s) Oral every 6 hours  acetaminophen     Tablet .. 975 milliGRAM(s) Oral every 8 hours PRN  eslicarbazepine 800 milliGRAM(s) Oral daily  lacosamide 200 milliGRAM(s) Oral every 12 hours  melatonin 3 milliGRAM(s) Oral at bedtime PRN  ondansetron Injectable 4 milliGRAM(s) IV Push every 8 hours PRN  aluminum hydroxide/magnesium hydroxide/simethicone Suspension 30 milliLiter(s) Oral every 4 hours PRN  pantoprazole    Tablet 40 milliGRAM(s) Oral before breakfast  atorvastatin 20 milliGRAM(s) Oral at bedtime  aspirin  chewable 81 milliGRAM(s) Oral daily  multivitamin 1 Tablet(s) Oral daily  	  VITAL SIGNS:  T(C): 37.3 (10-07-24 @ 13:50), Max: 37.3 (10-07-24 @ 13:50)  HR: 56 (10-07-24 @ 15:10) (56 - 65)  BP: 162/84 (10-07-24 @ 15:10) (146/80 - 207/86)  RR: 18 (10-07-24 @ 13:50) (18 - 18)  SpO2: 99% (10-07-24 @ 13:50) (98% - 99%)  Wt(kg): --    I&O's Summary                                   PHYSICAL EXAM:  Appearance: Normal	  HEENT: Normal oral mucosa, PERRL, EOMI	  Neck: Supple, - JVD; no Carotid Bruit   Cardiovascular: Normal S1 S2, + systolic murmur  Respiratory: Lungs clear to auscultation/No Rales, Rhonchi, Wheezing	  Gastrointestinal:  Soft, Non-tender, + BS	  Skin: No rashes, No ecchymoses, No cyanosis  Extremities: Normal range of motion, No clubbing, cyanosis or edema. Soft mass near healed incision site on L thigh (per pt is improving, was harder before)  Vascular: Peripheral pulses palpable 2+ bilaterally  Neurologic: Non-focal  Psychiatry: A & O x 3, Mood & affect appropriate    LABS:	 	                        9.0    6.34  )-----------( 232      ( 07 Oct 2024 05:30 )             29.2     10-07    139  |  104  |  14  ----------------------------<  76  3.8   |  26  |  0.86    Ca    8.8      07 Oct 2024 05:30  Phos  3.6     10-07  Mg     2.0     10-07    TPro  6.4  /  Alb  3.6  /  TBili  1.0  /  DBili  x   /  AST  64[H]  /  ALT  26  /  AlkPhos  114  10-07    proBNP:   Lipid Profile:   HgA1c:   TSH:   PT/INR - ( 06 Oct 2024 05:30 )   PT: 13.4 sec;   INR: 1.17           Cardiology PA Adult Progress Note  --Transfer from medicine to cardiac tele--    Hospital course: 53 y/o M w/ PMHx of Type A aortic dissection s/p Dacron Grafting and AV resuspension (2013) and total arch replacement, bioprosthetic AVR (23 mm), CABG x 1 (SVG-RCA, 2023) and aorta-axillary bypass graft (2023), PE (s/p IVC now removed 9/2024), HTN, hemorrhagic pancreatitis (followed by Dr. Solano), seizure disorder who presented to [facility] on 10/5 after outpatient labs showed hemoglobin of 8, with repeat in ED showing 5.9. Patient reported weakness and lightheadedness x 4 months. He was given 1 unit of pRBC and admitted to medicine service for management/work-up of anemia. Labs showing schistocytes and elevated LDH with concerns for hemolytic anemia. Medicine following for anemia, CTS was consulted given concern for bioprosthetic valve causing hemolytic anemia. TTE done 10/7, showing LVEF 65%, mod concentric LVH, severely dilated LA, LVOT/AV velocity ratio 0.49, chordal SHAJI present w/o LVOT obstruction, pHTN, PASP 54. Plan for BARBIE in AM to further assess bioprosthetic valve, cMRI tonight to work-up possible HOCM given SHAJI on TTE. Patient transferred to cardiac tele for continued BP management while pending results of cMRI and BARBIE.    SUBJECTIVE ASSESSMENT: Patient seen and examined at bedside, he was laying comfortably in bed. He is feeling well and currently denies any CP, HA, SOB, dizziness, palpitations, vision changes. He reports a history of headaches however states that he had a severe headache over the last two days (as per epilepsy neuro note: believed to be related to vEEG wrap) that alleviated after taking a medication his wife gave him (not from the pharmacy, he does not know the name of the med). Patient advised to only take medications from the hospital pharmacy as there may be interactions with his current medication regimen. He also reported an episode of squeezing CP that lasted for 1 hour last night and alleviated after 1 dose of tylenol 975 mg, no recurrence of CP.    	  MEDICATIONS:  hydrALAZINE 25 milliGRAM(s) Oral every 8 hours  NIFEdipine XL 30 milliGRAM(s) Oral every 24 hours  clindamycin   Capsule 300 milliGRAM(s) Oral every 6 hours  acetaminophen     Tablet .. 975 milliGRAM(s) Oral every 8 hours PRN  eslicarbazepine 800 milliGRAM(s) Oral daily  lacosamide 200 milliGRAM(s) Oral every 12 hours  melatonin 3 milliGRAM(s) Oral at bedtime PRN  ondansetron Injectable 4 milliGRAM(s) IV Push every 8 hours PRN  aluminum hydroxide/magnesium hydroxide/simethicone Suspension 30 milliLiter(s) Oral every 4 hours PRN  pantoprazole    Tablet 40 milliGRAM(s) Oral before breakfast  atorvastatin 20 milliGRAM(s) Oral at bedtime  aspirin  chewable 81 milliGRAM(s) Oral daily  multivitamin 1 Tablet(s) Oral daily  	  VITAL SIGNS:  T(C): 37.3 (10-07-24 @ 13:50), Max: 37.3 (10-07-24 @ 13:50)  HR: 56 (10-07-24 @ 15:10) (56 - 65)  BP: 162/84 (10-07-24 @ 15:10) (146/80 - 207/86)  RR: 18 (10-07-24 @ 13:50) (18 - 18)  SpO2: 99% (10-07-24 @ 13:50) (98% - 99%)  Wt(kg): --    I&O's Summary                                   PHYSICAL EXAM:  Appearance: Normal	  HEENT: Normal oral mucosa, PERRL, EOMI	  Neck: Supple, - JVD; no Carotid Bruit   Cardiovascular: Normal S1 S2, + systolic murmur  Respiratory: Lungs clear to auscultation/No Rales, Rhonchi, Wheezing	  Gastrointestinal:  Soft, Non-tender, + BS	  Skin: No rashes, No ecchymoses, No cyanosis  Extremities: Normal range of motion, No clubbing, cyanosis or edema. Soft mass near healed incision site on L thigh (per pt is improving, was harder before)  Vascular: Peripheral pulses palpable 2+ bilaterally  Neurologic: Non-focal  Psychiatry: A & O x 3, Mood & affect appropriate    LABS:	 	                        9.0    6.34  )-----------( 232      ( 07 Oct 2024 05:30 )             29.2     10-07    139  |  104  |  14  ----------------------------<  76  3.8   |  26  |  0.86    Ca    8.8      07 Oct 2024 05:30  Phos  3.6     10-07  Mg     2.0     10-07    TPro  6.4  /  Alb  3.6  /  TBili  1.0  /  DBili  x   /  AST  64[H]  /  ALT  26  /  AlkPhos  114  10-07    proBNP:   Lipid Profile:   HgA1c:   TSH:   PT/INR - ( 06 Oct 2024 05:30 )   PT: 13.4 sec;   INR: 1.17           Cardiology PA Adult Progress Note  --Transfer from medicine to cardiac tele--    Hospital course: 53 y/o M w/ PMHx of Type A aortic dissection s/p Dacron Grafting and AV resuspension (2013) and total arch replacement with bioprosthetic AVR (w/ Dr. Pierre, 3/2023), CABG x 1 (SVG-RCA, 5/2023) and aorta-axillary bypass graft (2023), DVTs (s/p IVC now removed 9/2023), HTN, hemorrhagic pancreatitis (followed by Dr. Solano), seizure disorder who presented to [facility] on 10/5 after outpatient labs showed hemoglobin of 8, with repeat in ED showing 5.9. Patient reported weakness and lightheadedness x 4 months. He was given 1 unit of pRBC and admitted to medicine service for management/work-up of anemia. Labs showing schistocytes and elevated LDH with concerns for hemolytic anemia. CTS was consulted given concern for bioprosthetic valve causing hemolytic anemia, Cardiology consulted for BP control. TTE done 10/7, showing LVEF 65%, mod concentric LVH, severely dilated LA, LVOT/AV velocity ratio 0.49, chordal SHAJI present w/o LVOT obstruction, pHTN, PASP 54. Plan for BARBIE in AM to further assess bioprosthetic valve, cMRI tonight to work-up possible HOCM given SHAJI on TTE. Patient transferred to cardiac tele for continued BP management while pending results of cMRI and BARBIE.    SUBJECTIVE ASSESSMENT: Patient seen and examined at bedside, he was laying comfortably in bed. He reports a history of headaches however states that he had a severe headache over the last two days (as per epilepsy neuro note: believed to be related to vEEG wrap) that alleviated after taking an Excedrine that his wife gave him. Patient advised to only take medications from the hospital pharmacy. He also reported an episode of squeezing CP that lasted for 1 hour last night and alleviated after 1 dose of tylenol 975 mg, no recurrence of CP. He is feeling well and currently denies any CP, HA, SOB, dizziness, palpitations, vision changes.   	  MEDICATIONS:  hydrALAZINE 25 milliGRAM(s) Oral every 8 hours  NIFEdipine XL 30 milliGRAM(s) Oral every 24 hours  clindamycin   Capsule 300 milliGRAM(s) Oral every 6 hours  acetaminophen     Tablet .. 975 milliGRAM(s) Oral every 8 hours PRN  eslicarbazepine 800 milliGRAM(s) Oral daily  lacosamide 200 milliGRAM(s) Oral every 12 hours  melatonin 3 milliGRAM(s) Oral at bedtime PRN  ondansetron Injectable 4 milliGRAM(s) IV Push every 8 hours PRN  aluminum hydroxide/magnesium hydroxide/simethicone Suspension 30 milliLiter(s) Oral every 4 hours PRN  pantoprazole    Tablet 40 milliGRAM(s) Oral before breakfast  atorvastatin 20 milliGRAM(s) Oral at bedtime  aspirin  chewable 81 milliGRAM(s) Oral daily  multivitamin 1 Tablet(s) Oral daily  	  VITAL SIGNS:  T(C): 37.3 (10-07-24 @ 13:50), Max: 37.3 (10-07-24 @ 13:50)  HR: 56 (10-07-24 @ 15:10) (56 - 65)  BP: 162/84 (10-07-24 @ 15:10) (146/80 - 207/86)  RR: 18 (10-07-24 @ 13:50) (18 - 18)  SpO2: 99% (10-07-24 @ 13:50) (98% - 99%)  Wt(kg): --    I&O's Summary                                   PHYSICAL EXAM:  Appearance: Normal	  HEENT: Normal oral mucosa, PERRL, EOMI	  Neck: Supple, - JVD; no Carotid Bruit   Cardiovascular: Normal S1 S2, + systolic murmur  Respiratory: Lungs clear to auscultation/No Rales, Rhonchi, Wheezing	  Gastrointestinal:  Soft, Non-tender, + BS	  Skin: No rashes, No ecchymoses, No cyanosis  Extremities: Normal range of motion, No clubbing, cyanosis or edema. Soft mass near healed incision site on L thigh   Vascular: Peripheral pulses palpable 2+ bilaterally  Neurologic: Non-focal  Psychiatry: A & O x 3, Mood & affect appropriate    LABS:	 	                        9.0    6.34  )-----------( 232      ( 07 Oct 2024 05:30 )             29.2     10-07    139  |  104  |  14  ----------------------------<  76  3.8   |  26  |  0.86    Ca    8.8      07 Oct 2024 05:30  Phos  3.6     10-07  Mg     2.0     10-07    TPro  6.4  /  Alb  3.6  /  TBili  1.0  /  DBili  x   /  AST  64[H]  /  ALT  26  /  AlkPhos  114  10-07    proBNP:   Lipid Profile:   HgA1c:   TSH:   PT/INR - ( 06 Oct 2024 05:30 )   PT: 13.4 sec;   INR: 1.17

## 2024-10-07 NOTE — PROGRESS NOTE ADULT - PROBLEM SELECTOR PLAN 3
History of epilepsy. Admitted for management of seizures Feb 2024. Follows outpt neurologist Dr Delilah Hernandez office ( [phone]). Had short several second episode of blank staring on 10/5.  - vEEG  - Epilepsy consulted  - Cw home lacosamide 200 q12h (8AM,8PM), eslicarbazepine 800mg QD (8pm)  - ensure pt taking meds

## 2024-10-07 NOTE — PROGRESS NOTE ADULT - SUBJECTIVE AND OBJECTIVE BOX
SUBJECTIVE ASSESSMENT:  54y Male assessed this AM at bedside. No acute complaints. Denies cp/sob/forrest/n/v/fever/chills. Denies lightheadedness & dizziness.         Vital Signs Last 24 Hrs  T(C): 37.1 (07 Oct 2024 05:38), Max: 37.2 (06 Oct 2024 17:24)  T(F): 98.7 (07 Oct 2024 05:38), Max: 99 (06 Oct 2024 17:24)  HR: 61 (07 Oct 2024 09:30) (57 - 65)  BP: 146/80 (07 Oct 2024 09:30) (146/80 - 207/86)  BP(mean): 102 (07 Oct 2024 09:30) (99 - 127)  RR: 18 (07 Oct 2024 07:05) (18 - 18)  SpO2: 99% (07 Oct 2024 07:05) (97% - 99%)    Parameters below as of 07 Oct 2024 07:05  Patient On (Oxygen Delivery Method): room air      I&O's Detail      PHYSICAL EXAM:  Appearance: No acute distress.  Neurologic: AAOx3, no AMS or focal deficits.  Responds appropriately to verbal and physical stimuli; exhibits purposeful movement in all extremities.  HEENT:   MMM, PERRLA, EOMI	b/l  Neck: Supple  Cardiovascular: RRR, S1 S2. No m/r/g.  Respiratory: No acute respiratory distress. CTA b/l, no w/r/r.   Gastrointestinal:  Soft, non-tender, non-distended, + BS.	  Skin: No rashes. No ecchymoses. No cyanosis.  Extremities: Exhibits normal range of motion, no clubbing, cyanosis or edema.  Vascular: Peripheral pulses palpable 2+ bilaterally.      LABS:                        9.0    6.34  )-----------( 232      ( 07 Oct 2024 05:30 )             29.2       COUMADIN:  No    PT/INR - ( 06 Oct 2024 05:30 )   PT: 13.4 sec;   INR: 1.17              10-07    139  |  104  |  14  ----------------------------<  76  3.8   |  26  |  0.86    Ca    8.8      07 Oct 2024 05:30  Phos  3.6     10-07  Mg     2.0     10-07    TPro  6.4  /  Alb  3.6  /  TBili  1.0  /  DBili  x   /  AST  64[H]  /  ALT  26  /  AlkPhos  114  10-07      Urinalysis Basic - ( 07 Oct 2024 05:30 )    Color: x / Appearance: x / SG: x / pH: x  Gluc: 76 mg/dL / Ketone: x  / Bili: x / Urobili: x   Blood: x / Protein: x / Nitrite: x   Leuk Esterase: x / RBC: x / WBC x   Sq Epi: x / Non Sq Epi: x / Bacteria: x        MEDICATIONS  (STANDING):  aspirin  chewable 81 milliGRAM(s) Oral daily  atorvastatin 20 milliGRAM(s) Oral at bedtime  clindamycin   Capsule 300 milliGRAM(s) Oral every 6 hours  eslicarbazepine 800 milliGRAM(s) Oral daily  hydrALAZINE 10 milliGRAM(s) Oral every 8 hours  lacosamide 200 milliGRAM(s) Oral every 12 hours  multivitamin 1 Tablet(s) Oral daily  NIFEdipine XL 30 milliGRAM(s) Oral every 24 hours  pantoprazole    Tablet 40 milliGRAM(s) Oral before breakfast    MEDICATIONS  (PRN):  acetaminophen     Tablet .. 975 milliGRAM(s) Oral every 8 hours PRN Moderate Pain (4 - 6)  acetaminophen     Tablet .. 650 milliGRAM(s) Oral every 6 hours PRN Temp greater or equal to 38C (100.4F), Mild Pain (1 - 3)  aluminum hydroxide/magnesium hydroxide/simethicone Suspension 30 milliLiter(s) Oral every 4 hours PRN Dyspepsia  melatonin 3 milliGRAM(s) Oral at bedtime PRN Insomnia  ondansetron Injectable 4 milliGRAM(s) IV Push every 8 hours PRN Nausea and/or Vomiting        RADIOLOGY & ADDITIONAL TESTS:      SUBJECTIVE ASSESSMENT:  54y Male assessed this AM at bedside. No acute complaints. Denies cp/sob/forrest/n/v/fever/chills. Denies lightheadedness & dizziness.         Vital Signs Last 24 Hrs  T(C): 37.1 (07 Oct 2024 05:38), Max: 37.2 (06 Oct 2024 17:24)  T(F): 98.7 (07 Oct 2024 05:38), Max: 99 (06 Oct 2024 17:24)  HR: 61 (07 Oct 2024 09:30) (57 - 65)  BP: 146/80 (07 Oct 2024 09:30) (146/80 - 207/86)  BP(mean): 102 (07 Oct 2024 09:30) (99 - 127)  RR: 18 (07 Oct 2024 07:05) (18 - 18)  SpO2: 99% (07 Oct 2024 07:05) (97% - 99%)    Parameters below as of 07 Oct 2024 07:05  Patient On (Oxygen Delivery Method): room air      I&O's Detail      PHYSICAL EXAM:  Appearance: No acute distress.  Neurologic: AAOx3, no AMS or focal deficits.  Responds appropriately to verbal and physical stimuli; exhibits purposeful movement in all extremities.  HEENT:   MMM, PERRLA, EOMI	b/l  Neck: Supple  Cardiovascular: RRR, S1 S2. No m/r/g.  Respiratory: No acute respiratory distress. CTA b/l, no w/r/r.   Gastrointestinal:  Soft, non-tender, non-distended, + BS.	  Skin: No rashes. No ecchymoses. No cyanosis.  Extremities: Exhibits normal range of motion, no clubbing, cyanosis or edema.  Vascular: Peripheral pulses palpable 2+ bilaterally.      LABS:                        9.0    6.34  )-----------( 232      ( 07 Oct 2024 05:30 )             29.2       COUMADIN:  No    PT/INR - ( 06 Oct 2024 05:30 )   PT: 13.4 sec;   INR: 1.17              10-07    139  |  104  |  14  ----------------------------<  76  3.8   |  26  |  0.86    Ca    8.8      07 Oct 2024 05:30  Phos  3.6     10-07  Mg     2.0     10-07    TPro  6.4  /  Alb  3.6  /  TBili  1.0  /  DBili  x   /  AST  64[H]  /  ALT  26  /  AlkPhos  114  10-07      Urinalysis Basic - ( 07 Oct 2024 05:30 )    Color: x / Appearance: x / SG: x / pH: x  Gluc: 76 mg/dL / Ketone: x  / Bili: x / Urobili: x   Blood: x / Protein: x / Nitrite: x   Leuk Esterase: x / RBC: x / WBC x   Sq Epi: x / Non Sq Epi: x / Bacteria: x        MEDICATIONS  (STANDING):  aspirin  chewable 81 milliGRAM(s) Oral daily  atorvastatin 20 milliGRAM(s) Oral at bedtime  clindamycin   Capsule 300 milliGRAM(s) Oral every 6 hours  eslicarbazepine 800 milliGRAM(s) Oral daily  hydrALAZINE 10 milliGRAM(s) Oral every 8 hours  lacosamide 200 milliGRAM(s) Oral every 12 hours  multivitamin 1 Tablet(s) Oral daily  NIFEdipine XL 30 milliGRAM(s) Oral every 24 hours  pantoprazole    Tablet 40 milliGRAM(s) Oral before breakfast    MEDICATIONS  (PRN):  acetaminophen     Tablet .. 975 milliGRAM(s) Oral every 8 hours PRN Moderate Pain (4 - 6)  acetaminophen     Tablet .. 650 milliGRAM(s) Oral every 6 hours PRN Temp greater or equal to 38C (100.4F), Mild Pain (1 - 3)  aluminum hydroxide/magnesium hydroxide/simethicone Suspension 30 milliLiter(s) Oral every 4 hours PRN Dyspepsia  melatonin 3 milliGRAM(s) Oral at bedtime PRN Insomnia  ondansetron Injectable 4 milliGRAM(s) IV Push every 8 hours PRN Nausea and/or Vomiting        RADIOLOGY & ADDITIONAL TESTS:  < from: TTE Echo Complete w/o Contrast w/ Doppler (10.07.24 @ 11:54) >  CONCLUSIONS:     1. There is moderate concentric left ventricular hypertrophy. The left   ventricle is normal in size and systolic function with a calculated   ejection fraction of 65%.   2. Normal right ventricular size and systolic function.   3. Severely dilated left atrium.   4. A bioprosthetic valve noted in the aortic position. The peak   transvalvular velocity is 3.59 m/s, the mean transvalvular gradient is   25.00 mmHg, and the LVOT/AV velocity ratio is 0.49. The peak transaortic   gradient is 51.55 mmHg. There is no evidence of aortic regurgitation.   5. Chordal SHAJI present without LVOT obstruction. Probably moderate   mitral regurgitation.   6. Moderate tricuspid regurgitation.   7. Pulmonary hypertension present, pulmonary artery systolic pressure is   54 mmHg.   8. No pericardial effusion.   9. Compared to the previous TTE performed on 6/14/2023, the transaortic   gradients are higher on the current study. Apical aneurysm is not   appreciated on the current study.    < end of copied text >

## 2024-10-07 NOTE — PROGRESS NOTE ADULT - PROBLEM SELECTOR PLAN 1
SBP on arrival to /77, s/p pRBC transfusion SBP has been elevated, peak 207/86, 190/84 (both taken prior to BP meds were given)  - Per pt at home he occasionally takes BP records, usually his SBP is in 120s  - Pt reporting headaches which he has daily but states they have been worse during this admission, denies any vision or speech changes. He had one episode of squeezing CP last night lasting 1 hour that alleviated after 1 dose of tylenol 975 mg was given, no recurrence  - Home BP meds: SBP on arrival to /77, s/p pRBC transfusion SBP has been elevated, peak 207/86, 190/84 (both taken prior to BP meds were given)  - Per pt at home he occasionally takes BP records, usually his SBP is in 120s  - Pt reporting headaches which he has daily but states they have been worse during this admission, denies any vision or speech changes. He had one episode of squeezing CP last night lasting 1 hour that alleviated after 1 dose of tylenol 975 mg was given, no recurrence  - Home BP meds: amlodipine 10mg qd, bisoprolol 10mg qd, hctz 12.5mg qd  - Current BP regimen: Nifedipine 30 mg QD, hydralazine 25 mg q8 hours. BB held for bradycardia  - Keep pt normotensive in setting of hx of aortic dissection

## 2024-10-07 NOTE — PROGRESS NOTE ADULT - ASSESSMENT
54y Male with PMHx of hemorrhagic pancreatitis (followed by Dr. Solano), Type A aortic dissection s/p Dacron Grafting and AV resuspension (2013) and total arch replacement, AVR (23-mm Inspiris [employer] biological valve), CABG x 1 (SVG-RCA) and aorta-axillary bypass graft (2023), PE (s/p IVC now removed 9/2024), HTN, seizure disorder, recent dental procedure (10/1) on clindamycin (x 2 weeks) who presented to [facility] on 10/5 for anemia (hgb 8 on outpatient labs). Found to have hemolytic anemia, admitted to medicine for further workup. CTS following for possible AVR dysfunction causing hemolytic anemia. Cardiology consulted for same and HTN management.     Doppler LLE 8/20/24: no LLE DVT   EKG 10/4/24: sinus bradycardia  TTE 10/7/2024: Moderate concentric LVH. The LV is normal in size and systolic function with EF 65%. Normal RV size and function. Severely dilated LA. A bioprosthetic valve noted in the aortic position. The peak transvalvular velocity is 3.59 m/s, the mean transvalvular gradient is 25.00 mmHg, and the LVOT/AV velocity ratio is 0.49. The peak transaortic gradient is 51.55 mmHg. There is no evidence of aortic regurgitation.  Chordal SHAJI present without LVOT obstruction. Probably moderate MR. Moderate TR. Pulmonary hypertension present, pulmonary artery systolic pressure is   54 mmHg. Compared to the previous TTE performed on 6/14/2023, the transaortic gradients are higher on the current study. Apical aneurysm is not appreciated on the current study.    TTE 6/10/2024: LVEF 70%, moderate LVH, G2DD, LA severely dilated, RA moderately dilated bioprosthetic aortic valve – well seated with normal function, echogenic graft material noted in prox ascending aorta c/w known collins-arch replacement, mild MR, mild to moderate TR   CTA chest aorta 4/2024: Status post aortic valve replacement and graft replacement of ascending aorta and portion of aortic arch with TEVAR of the descending aorta as seen on prior studies. Decreased size of aneurysm of the descending aorta now measuring 5 cm. No endoleak. Nonocclusive dissection flap again noted in the right subclavian artery and right common carotid artery. Patent bypass graft of the left subclavian artery. 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      Cardiologist: Vivi last seen 7/2024.    Home meds: aspirin 81mg, amlodipine 10mg qd, atorvastatin 20mg, bisoprolol 10mg qd, bumex 2mg qd, hctz 12.5mg qd, lacosamide 200mg qd, protonix 40mg po qd     # Hemolytic anemia, likely 2/2 bioprosthetic AVR dysfunction  TTE from this admission reviewed. Transaortic gradients are higher than from echo 6/2024. : The peak transvalvular velocity is 3.59 m/s, the mean transvalvular gradient is 25.00 mmHg, and the LVOT/AV velocity ratio is 0.49. The peak transaortic gradient is 51.55 mmHg.  Haptoglobin <10, LDH >1000  - s/p 1 U PRBC , Hb stable 9  - TTE results reviewed. Will need BARBIE and cardiac MRI for further evaluation.     # Systolic anterior motion without LVOT obstruction    # S/p Type A aortic dissection s/p Dacron Grafting and AV resuspension (2013) and total arch replacement, AVR (23-mm Inspiris [employer] biological valve)  # HTN  # Sinus bradycardia  - Continue nifedipine 30 mg daily (in place of home amlodipine 10mg)  - Increase hydralazine to 25mg TID (up from home 10mg TID due to significantly elevated BP readings overnight and this morning with systolic 190, 200)  - BB held iso sinus bradycardia    #CABG x 1 (SVG-RCA) and aorta-axillary bypass graft (2023)  - c/w asa 81mg   - c/w atorvastatin 20mg      54y Male with PMHx of hemorrhagic pancreatitis (followed by Dr. Solano), Type A aortic dissection s/p Dacron Grafting and AV resuspension (2013) and total arch replacement, AVR (23-mm Inspiris [employer] biological valve), CABG x 1 (SVG-RCA) and aorta-axillary bypass graft (2023), PE (s/p IVC now removed 9/2024), HTN, seizure disorder, recent dental procedure (10/1) on clindamycin (x 2 weeks) who presented to [facility] on 10/5 for anemia (hgb 8 on outpatient labs). Found to have hemolytic anemia, admitted to medicine for further workup. CTS following for possible AVR dysfunction causing hemolytic anemia. Cardiology consulted for same and HTN management.     Doppler LLE 8/20/24: no LLE DVT   EKG 10/4/24: sinus bradycardia  TTE 10/7/2024: Moderate concentric LVH. The LV is normal in size and systolic function with EF 65%. Normal RV size and function. Severely dilated LA. A bioprosthetic valve noted in the aortic position. The peak transvalvular velocity is 3.59 m/s, the mean transvalvular gradient is 25.00 mmHg, and the LVOT/AV velocity ratio is 0.49. The peak transaortic gradient is 51.55 mmHg. There is no evidence of aortic regurgitation.  Chordal SHAJI present without LVOT obstruction. Probably moderate MR. Moderate TR. Pulmonary hypertension present, pulmonary artery systolic pressure is   54 mmHg. Compared to the previous TTE performed on 6/14/2023, the transaortic gradients are higher on the current study. Apical aneurysm is not appreciated on the current study.    TTE 6/10/2024: LVEF 70%, moderate LVH, G2DD, LA severely dilated, RA moderately dilated bioprosthetic aortic valve – well seated with normal function, echogenic graft material noted in prox ascending aorta c/w known collins-arch replacement, mild MR, mild to moderate TR   CTA chest aorta 4/2024: Status post aortic valve replacement and graft replacement of ascending aorta and portion of aortic arch with TEVAR of the descending aorta as seen on prior studies. Decreased size of aneurysm of the descending aorta now measuring 5 cm. No endoleak. Nonocclusive dissection flap again noted in the right subclavian artery and right common carotid artery. Patent bypass graft of the left subclavian artery. 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      Cardiologist: Vivi last seen 7/2024.    Home meds: aspirin 81mg, amlodipine 10mg qd, atorvastatin 20mg, bisoprolol 10mg qd, bumex 2mg qd, hctz 12.5mg qd, lacosamide 200mg qd, protonix 40mg po qd     # Hemolytic anemia, likely 2/2 bioprosthetic AVR dysfunction  TTE from this admission reviewed. Transaortic gradients are higher than from echo 6/2024. : The peak transvalvular velocity is 3.59 m/s, the mean transvalvular gradient is 25.00 mmHg, and the LVOT/AV velocity ratio is 0.49. The peak transaortic gradient is 51.55 mmHg.  Haptoglobin <10, LDH >1000  - s/p 1 U PRBC , Hb stable 9  - TTE results reviewed. Will need BARBIE and cardiac MRI for further evaluation.     # Systolic anterior motion without LVOT obstruction    # S/p Type A aortic dissection s/p Dacron Grafting and AV resuspension (2013) and total arch replacement, AVR (23-mm Inspiris [employer] biological valve)  # HTN  - Continue nifedipine 30 mg daily (in place of home amlodipine 10mg)  - Increase hydralazine to 25mg TID (up from home 10mg TID due to significantly elevated BP readings overnight and this morning with systolic 190, 200)    #CABG x 1 (SVG-RCA) and aorta-axillary bypass graft (2023)  - c/w asa 81mg   - c/w atorvastatin 20mg

## 2024-10-07 NOTE — PROGRESS NOTE ADULT - PROBLEM SELECTOR PLAN 1
Patient with history of anemia, presenting with 4 months of progressive weakness and lightheadedness. Was found to have Hb of 8 today at ouptpt clinic. Hb 5.9 on admission. Baseline Hb around 9-10 per chart review. MCV 98.5 Plt 158. Schistocytes on peripheral smear and labs suggestive of hemolysis likely intravascular shearing.    Plan:  - Continue home pantoprazole 40 mg qd  - daily H/H, maintain Hb>8 iso CAD  - monitor hemodynamics and for bleeding  - CTS consulted for concern of valvular dysfunction leading to shearing  - TTE pending  - f/u Coomb's, NIGEL  - Heme c/s for evaluation of other causes of hemolysis pending TTE and Coomb[facility]  - Consulted Cardio for adjustment of antihypertensives for reducing afterload to prevent excessive shearing Patient with history of anemia, presenting with 4 months of progressive weakness and lightheadedness. Was found to have Hb of 8 today at ouptpt clinic. Hb 5.9 on admission. Baseline Hb around 9-10 per chart review. MCV 98.5 Plt 158. Schistocytes on peripheral smear and labs suggestive of hemolysis likely intravascular shearing.  - TTE (10/7): EF 65%, biprosthetic valve noted in aortic position, moderate tricuspid regurg, pulm HTN (PASP 54)    Plan:  - Continue home pantoprazole 40 mg qd  - daily H/H, maintain Hb>8 iso CAD  - monitor hemodynamics and for bleeding  - CTS consulted for concern of valvular dysfunction leading to shearing; ok to f/u w/ Dr. Pierre outpt 6wks after dc.  - f/u Coomb's  - Heme c/s for evaluation of other causes of hemolysis pending TTE and Coomb's  - Consulted Cardio for adjustment of antihypertensives for reducing afterload to prevent excessive shearing

## 2024-10-07 NOTE — PROGRESS NOTE ADULT - PROBLEM SELECTOR PLAN 2
Pt endorsing 4 months of progressive weakness and dizziness, hgb in ED 5.9   - s/p 1 unit pRBC on 10/4, hgb today 9.0  - pt states he is feeling much better post transfusion despite having elevated BPs  - Pt endorsing 4 months of progressive weakness and dizziness, hgb in ED 5.9   - s/p 1 unit pRBC on 10/4, hgb today 9.0  - pt states he is feeling much better post transfusion despite having elevated BPs  - Sergey test negative, BARBIE in AM to reassess AVR if cause of hemolytic anemia  - maintain Hb>7, active type & screen Pt endorsing 4 months of progressive weakness and dizziness, hgb in ED 5.9   - s/p 1 unit pRBC on 10/4, hgb today 9.0  - pt states he is feeling much better post transfusion despite having elevated BPs  - Sergey test negative, BARBIE in AM to reassess AVR shearing RBCs causing hemolytic anemia  - maintain Hb>7, active type & screen  - consider heme consult pending BARBIE results

## 2024-10-07 NOTE — EEG REPORT - NS EEG TEXT BOX
[facility] Department of Neurology  Inpatient Continuous video-Electroencephalogram      Patient Name:	VINCENT MARIE    :	-  MRN:	-    Study Start Date/Time:	10/5/2024, 3:35:38 PM  Study End Date/Time:    Referred by:  Choose an item.    Brief Clinical History:  VINCENT MARIE is a - old -; study performed to investigate for seizures or markers of epilepsy.     Diagnosis Code:  R56.9 convulsions/seizure    Pertinent Medication:  n/a    Acquisition Details:  Electroencephalography was acquired using a minimum of 21 channels on an [employer] [facility] system v 9.3.1 with electrode placement according to the standard International 10-20 system following ACNS (American Clinical Neurophysiology Society) guidelines.  Anterior temporal T1 and T2 electrodes were utilized whenever possible.  The [employer]TEK automated spike & seizure detections were all reviewed in detail, in addition to the entire raw EEG.    Findings:  Day 1:  10/5/2024, 3:35:38 PM to next morning at 07:00 AM   Background:  continuous, with predominantly alpha and beta frequencies.  Generalized Slowing:  None  Symmetry/Focality: No persistent asymmetries of voltage or frequency.        Voltage:  Normal (20+ uV)  Organization:  Appropriate anterior-posterior gradient  Posterior Dominant Rhythm:  9 Hz symmetric, well-organized, and well-modulated  Sleep:  Symmetric, synchronous spindles and K complexes.  Variability:   Yes		Reactivity:  Yes    Spontaneous Activity:  Occasional ( >1/hr < 1/min) spikes over right temporal region     Events:  1)	No electrographic seizures or significant clinical events occurred during this study.  Provocations:  •	Hyperventilation: was not performed.  •	Photic stimulation: was not performed.  Daily Summary:    1)	Occasional ( >1/hr < 1/min) spikes over right temporal region suggestive of underlying epileptic potential        Dary Flores MD  Attending Neurologist, [facility]psy [facility]        Daily Updates (from 07:00 am until 07:00 am):  Day 2   10/6-10/7/2024  Background:  continuous, with predominantly alpha and beta frequencies.  Generalized Slowing:  None  Symmetry/Focality: No persistent asymmetries of voltage or frequency.        Voltage:  Normal (20+ uV)  Organization:  Appropriate anterior-posterior gradient  Posterior Dominant Rhythm:  9 Hz symmetric, well-organized, and well-modulated  Sleep:  Symmetric, synchronous spindles and K complexes.  Variability:   Yes		Reactivity:  Yes    Spontaneous Activity:  Occasional ( >1/hr < 1/min) spikes over right temporal region     Events:  1)	No electrographic seizures or significant clinical events occurred during this study.  Provocations:  •	Hyperventilation: was not performed.  •	Photic stimulation: was not performed.  Daily Summary:    1)	Occasional ( >1/hr < 1/min) spikes over right temporal region suggestive of underlying epileptic potential        Dary Flores MD  Attending Neurologist, [facility] Epilepsy [facility]             [facility] Department of Neurology  Inpatient Continuous video-Electroencephalogram      Patient Name:	VINCENT MARIE    :	-  MRN:	-    Study Start Date/Time:	10/5/2024, 3:35:38 PM  Study End Date/Time:  10/7/2024, 8:22 AM    Referred by:  Edu Ortez MD    Brief Clinical History:  VINCENT MARIE is a - old -; study performed to investigate for seizures or markers of epilepsy.     Diagnosis Code:  R56.9 convulsions/seizure    Pertinent Medication:  n/a    Acquisition Details:  Electroencephalography was acquired using a minimum of 21 channels on an [employer] system v 9.3.1 with electrode placement according to the standard International 10-20 system following ACNS (American Clinical Neurophysiology Society) guidelines.  Anterior temporal T1 and T2 electrodes were utilized whenever possible.  The XLTEK automated spike & seizure detections were all reviewed in detail, in addition to the entire raw EEG.    Findings:  Day 1:  10/5/2024, 3:35:38 PM to next morning at 07:00 AM   Background:  continuous, with predominantly alpha and beta frequencies.  Generalized Slowing:  None  Symmetry/Focality: No persistent asymmetries of voltage or frequency.        Voltage:  Normal (20+ uV)  Organization:  Appropriate anterior-posterior gradient  Posterior Dominant Rhythm:  9 Hz symmetric, well-organized, and well-modulated  Sleep:  Symmetric, synchronous spindles and K complexes.  Variability:   Yes		Reactivity:  Yes    Spontaneous Activity:  Occasional ( >1/hr < 1/min) spikes over right temporal region     Events:  1)	No electrographic seizures or significant clinical events occurred during this study.  Provocations:  •	Hyperventilation: was not performed.  •	Photic stimulation: was not performed.  Daily Summary:    1)	Occasional ( >1/hr < 1/min) spikes over right temporal region suggestive of underlying epileptic potential        Dary Flores MD  Attending Neurologist, [facility]      Day 2   10/6/2024 @ 7 AM to 10/7/2024 @ 8:22 AM  Background:  continuous, with predominantly alpha and beta frequencies.  Generalized Slowing:  None  Symmetry/Focality: No persistent asymmetries of voltage or frequency.        Voltage:  Normal (20+ uV)  Organization:  Appropriate anterior-posterior gradient  Posterior Dominant Rhythm:  9 Hz symmetric, well-organized, and well-modulated  Sleep:  Symmetric, synchronous spindles and K complexes.  Variability:   Yes		Reactivity:  Yes    Spontaneous Activity:  Occasional ( >1/hr < 1/min) spikes over right temporal region     Events:  1)	No electrographic seizures or significant clinical events occurred during this study.  Provocations:  •	Hyperventilation: was not performed.  •	Photic stimulation: was not performed.  Daily Summary:    1)	Occasional ( >1/hr < 1/min) spikes over right temporal region suggestive of underlying epileptic potential        Dary Flores MD  Attending Neurologist, [facility]                FINAL Impression:  Abnormal Continuous/long-term video-EEG  1)	Occasional ( >1/hr < 1/min) spikes over right temporal region suggestive of underlying epileptic potential        Final Clinical Correlation:  1)	There were indicators of  right temporal epileptic potential           Dary Flores MD  Attending Neurologist, [facility]psy [facility]     H Plasty Text: Given the location of the defect, shape of the defect and the proximity to free margins a H-plasty was deemed most appropriate for repair.  Using a sterile surgical marker, the appropriate advancement arms of the H-plasty were drawn incorporating the defect and placing the expected incisions within the relaxed skin tension lines where possible. The area thus outlined was incised deep to adipose tissue with a #15 scalpel blade. The skin margins were undermined to an appropriate distance in all directions utilizing iris scissors.  The opposing advancement arms were then advanced into place in opposite direction and anchored with interrupted buried subcutaneous sutures.

## 2024-10-07 NOTE — PROGRESS NOTE ADULT - PROBLEM SELECTOR PLAN 2
Home rx: amlodipine 10mg QD, bisoprolol 10mg  - Holding bisoprolol  - Consulted cards on antihypertensive regimen iso Mechanical AV  - c/w amlodipine 10mg QD; consider replacing w/ nifedipine 30 if SBP still elevated (goal: 120)  - Starting hydral 10mg TID. Home rx: amlodipine 10mg QD, bisoprolol 10mg  - Holding home bisoprolol  - Consulted cards on antihypertensive regimen iso Mechanical AV  - c/w hydral 10mg TID.  - Replaced amlodipine w/ nifedipine 35mg TID as BP remained elevated today

## 2024-10-07 NOTE — PROGRESS NOTE ADULT - ASSESSMENT
55 y/o M w/ PMHx of Type A aortic dissection s/p Dacron Grafting and AV resuspension (2013) and total arch replacement, bioprosthetic AVR (23 mm), CABG x 1 (SVG-RCA, 2023) and aorta-axillary bypass graft (2023), PE (s/p IVC now removed 9/2024), HTN, hemorrhagic pancreatitis (followed by Dr. Solano), seizure disorder who presented to [facility] on 10/5 after outpatient labs showed hemoglobin of 8, with repeat in ED showing 5.9. Patient reported weakness and lightheadedness x 4 months. He was given 1 unit of pRBC and admitted to medicine service for management/work-up of anemia. Labs showing schistocytes and elevated LDH with concerns for hemolytic anemia. CTS was consulted given concern for bioprosthetic valve causing hemolytic anemia.   55 y/o M w/ PMHx of Type A aortic dissection s/p Dacron Grafting and AV resuspension (2013) and total arch replacement with bioprosthetic AVR (w/ Dr. Pierre, 3/2023), CABG x 1 (SVG-RCA, 5/2023) and aorta-axillary bypass graft (2023), DVTs (s/p IVC now removed 9/2023), HTN, hemorrhagic pancreatitis (followed by Dr. Solano), seizure disorder who presented to [facility] on 10/5 after outpatient labs showed hemoglobin of 8, with repeat in ED showing 5.9 w/  concern for hemolytic anemia, s/p transfusion 1 unit of pRBC, admitted to medicine for w/u. CTS consulted given concern for bioprosthetic valve causing hemolytic anemia, Cardiology consulted for BP control. Plan for BARBIE in AM to further assess bioprosthetic valve, cMRI tonight to work-up possible HOCM given SHAJI on TTE. Patient transferred to cardiac tele for continued BP management while pending results of cMRI and BARBIE.

## 2024-10-07 NOTE — PROGRESS NOTE ADULT - SUBJECTIVE AND OBJECTIVE BOX
INTERVAL EVENTS:    PAST MEDICAL & SURGICAL HISTORY:  HTN (hypertension)    Aortic dissection    CAD (coronary artery disease)    Seizure disorder    Seizure disorder    Hypertension    Status post endovascular aneurysm repair (EVAR)    Pancreatitis  hospitalized for 5 months per spouse    DVT, lower extremity    Pulmonary embolism    S/P aortic bifurcation bypass graft    S/P CABG x 1    Elective surgery  IVC filter        MEDICATIONS  (STANDING):  aspirin  chewable 81 milliGRAM(s) Oral daily  atorvastatin 20 milliGRAM(s) Oral at bedtime  clindamycin   Capsule 300 milliGRAM(s) Oral every 6 hours  eslicarbazepine 800 milliGRAM(s) Oral daily  hydrALAZINE 10 milliGRAM(s) Oral every 8 hours  lacosamide 200 milliGRAM(s) Oral every 12 hours  multivitamin 1 Tablet(s) Oral daily  NIFEdipine XL 30 milliGRAM(s) Oral every 24 hours  pantoprazole    Tablet 40 milliGRAM(s) Oral before breakfast    MEDICATIONS  (PRN):  acetaminophen     Tablet .. 650 milliGRAM(s) Oral every 6 hours PRN Temp greater or equal to 38C (100.4F), Mild Pain (1 - 3)  acetaminophen     Tablet .. 975 milliGRAM(s) Oral every 8 hours PRN Moderate Pain (4 - 6)  aluminum hydroxide/magnesium hydroxide/simethicone Suspension 30 milliLiter(s) Oral every 4 hours PRN Dyspepsia  melatonin 3 milliGRAM(s) Oral at bedtime PRN Insomnia  ondansetron Injectable 4 milliGRAM(s) IV Push every 8 hours PRN Nausea and/or Vomiting    T(F): 98.7 (10-07-24 @ 05:38), Max: 99 (10-06-24 @ 17:24)  HR: 62 (10-07-24 @ 07:05) (57 - 65)  BP: 207/86 (10-07-24 @ 07:05) (148/74 - 207/86)  BP(mean): 127 (10-07-24 @ 07:05) (99 - 127)  ABP: --  ABP(mean): --  RR: 18 (10-07-24 @ 07:05) (18 - 18)  SpO2: 99% (10-07-24 @ 07:05) (97% - 99%)  CVP(mm Hg): --    I/O Detail 24H      PHYSICAL EXAM:  GEN: NAD  HEENT: EOMI   RESP: CTA b/l  CV: RRR. Normal S1/S2. No m/r/g.  ABD: soft, non-distended  EXT: No edema   NEURO: alert and attentive    LABS:  CBC 10-07-24 @ 05:30                        9.0    6.34  )-----------( 232                   29.2     Hgb trend: 9.0 <-- , 9.1 <-- , 8.7 <-- , 7.9 <-- , 5.9 <--   WBC trend: 6.34 <-- , 6.85 <-- , 7.08 <-- , 6.70 <-- , 5.22 <--       CMP 10-07-24 @ 05:30    139  |  104  |  14  ----------------------------<  76  3.8   |  26  |  0.86    Ca    8.8      10-07-24 @ 05:30  Phos  3.6     10-07  Mg     2.0     10-07    TPro  6.4  /  Alb  3.6  /  TBili  1.0  /  DBili  x   /  AST  64[H]  /  ALT  26  /  AlkPhos  114     10-07    Serum Cr (eGFR) trend: 0.86 (102) <-- , 0.84 (103) <-- , 1.00 (89) <-- , 1.14 (76) <--       PT/INR - ( 06 Oct 2024 05:30 )   PT: 13.4 sec;   INR: 1.17         Cardiac Markers         STUDIES:           INTERVAL EVENTS: Seen this morning and examined. Feeling fatigued this morning and cannot remember some details of his medical history. ROS otherwise negative. BP this morning significantly elevated with , 150 and 190.      PAST MEDICAL & SURGICAL HISTORY:  HTN (hypertension)  Aortic dissection  CAD (coronary artery disease)  Seizure disorder  Seizure disorder  Hypertension  Status post endovascular aneurysm repair (EVAR)  Pancreatitis  hospitalized for 5 months per spouse  DVT, lower extremity  Pulmonary embolism  S/P aortic bifurcation bypass graft  S/P CABG x 1  Elective surgery  IVC filter    MEDICATIONS  (STANDING):  aspirin  chewable 81 milliGRAM(s) Oral daily  atorvastatin 20 milliGRAM(s) Oral at bedtime  clindamycin   Capsule 300 milliGRAM(s) Oral every 6 hours  eslicarbazepine 800 milliGRAM(s) Oral daily  hydrALAZINE 10 milliGRAM(s) Oral every 8 hours  lacosamide 200 milliGRAM(s) Oral every 12 hours  multivitamin 1 Tablet(s) Oral daily  NIFEdipine XL 30 milliGRAM(s) Oral every 24 hours  pantoprazole    Tablet 40 milliGRAM(s) Oral before breakfast    MEDICATIONS  (PRN):  acetaminophen     Tablet .. 650 milliGRAM(s) Oral every 6 hours PRN Temp greater or equal to 38C (100.4F), Mild Pain (1 - 3)  acetaminophen     Tablet .. 975 milliGRAM(s) Oral every 8 hours PRN Moderate Pain (4 - 6)  aluminum hydroxide/magnesium hydroxide/simethicone Suspension 30 milliLiter(s) Oral every 4 hours PRN Dyspepsia  melatonin 3 milliGRAM(s) Oral at bedtime PRN Insomnia  ondansetron Injectable 4 milliGRAM(s) IV Push every 8 hours PRN Nausea and/or Vomiting    T(F): 98.7 (10-07-24 @ 05:38), Max: 99 (10-06-24 @ 17:24)  HR: 62 (10-07-24 @ 07:05) (57 - 65)  BP: 207/86 (10-07-24 @ 07:05) (148/74 - 207/86)  BP(mean): 127 (10-07-24 @ 07:05) (99 - 127)  ABP: --  ABP(mean): --  RR: 18 (10-07-24 @ 07:05) (18 - 18)  SpO2: 99% (10-07-24 @ 07:05) (97% - 99%)  CVP(mm Hg): --    I/O Detail 24H      PHYSICAL EXAM:  NAD  RRR, +systolic murmur loudest at the lower left sternal border   CTAB  no LE edema, wwp     LABS:  CBC 10-07-24 @ 05:30                        9.0    6.34  )-----------( 232                   29.2     Hgb trend: 9.0 <-- , 9.1 <-- , 8.7 <-- , 7.9 <-- , 5.9 <--   WBC trend: 6.34 <-- , 6.85 <-- , 7.08 <-- , 6.70 <-- , 5.22 <--       CMP 10-07-24 @ 05:30    139  |  104  |  14  ----------------------------<  76  3.8   |  26  |  0.86    Ca    8.8      10-07-24 @ 05:30  Phos  3.6     10-07  Mg     2.0     10-07    TPro  6.4  /  Alb  3.6  /  TBili  1.0  /  DBili  x   /  AST  64[H]  /  ALT  26  /  AlkPhos  114     10-07    Serum Cr (eGFR) trend: 0.86 (102) <-- , 0.84 (103) <-- , 1.00 (89) <-- , 1.14 (76) <--       PT/INR - ( 06 Oct 2024 05:30 )   PT: 13.4 sec;   INR: 1.17         Cardiac Markers         STUDIES:

## 2024-10-07 NOTE — DISCHARGE NOTE PROVIDER - HOSPITAL COURSE
53 yo M with PMHx of pancreatitis, Type A aortic dissection s/p Dacron Grafting and AV resuspension (2013) and total arch replacement with bioprosthetic AVR (w/ Dr. Pierre, 3/2023), CABG x 1 (SVG-RCA, 5/2023) and aorta-axillary bypass graft (2023), DVTs (s/p IVC now removed 9/2023), HTN, hemorrhagic pancreatitis (followed by Dr. Solano), seizure disorder, presents for anemia (Hb 8) found on outpatient labs. He has been feeling weak and lightheaded, found to be in symptomatic anemia w hgb of 5.9. Pt received 1u of transfusion and responded well. Significant labs included schistocytes on peripheral smear, elevated reticulocytes, elevated LDH and low haptoglobin. Admitted to medicine for further work up of hemolytic anemia.    During hospital course, TTE was ordered to assess the placement of aortic valve and showed bioprosthetic valve noted in the aortic position, reviewed by CT surgery and stable for discharge with plan to f/u outpatient in 6 weeks. Pt did undergo BARBIE 10/8 for further evaluation of valve which appeared to be functioning normally, no AR.    Cardiology was consulted to manage antihypertensives and pt was switched from amlodipine 10mg qd (home rx) to nifedipine 30mg qd and hydralazine 25mg qd. Pt was transferred to tele service for titration of hypertensive medications until normotensive.     Epilepsy team started following pt as well 2/2 3 sec of staring spell noted by spouse on 10/5; s/p vEEG. Discussed with neurologist (CRISTIAN Hernandez), increase Eslicarbazepine 800mg po HS  to 1200mg QHS (2000).      Medicine following for anemia, Hgb has remained stable. Pt will need close follow up of     Pt able to ambulate and void without complication. VSS. Labs and telemetry reviewed. Pt is a candidate for discharge per Dr. Navarro. Pt given appropriate discharge instructions, pt states they have an appropriate amount of their previous home meds unchanged from this visit at home, and any new medications were sent to their pharmacy. Pt instructed to f/u with PCP within one week and Dr. Mcdaniel within in 1-2 weeks.    GLP-1 receptor agonist/SGLT2 inhibitor meds discussed w/ patients and encouraged to discuss further with PMD or Endocrinologist at next visit.      Pt discharge copies detail cardiovascular history, medications, testing/treatments, OR patient has created a patient portal account and instructed to provide their records at their 1st appointment.    Discharge cardiac medications:  o Hydralazine 25 mg TID  o Nifedipine XL 30 mg QD  o Aspirin 81 mg QD  o Atorvastatin 20 mg QD    Other discharge medications:  o clindamycin 300 mg q6hrs (after dental procedure)  o eslicarbazepine 1200 milliGRAM(s) Oral at bedtime  o lacosamide 200 milliGRAM(s) Oral every 12 hours  o pantoprazole 40 mg QD   55 yo M with PMHx of pancreatitis, Type A aortic dissection s/p Dacron Grafting and AV resuspension (2013) and total arch replacement with bioprosthetic AVR (w/ Dr. Pierre, 3/2023), CABG x 1 (SVG-RCA, 5/2023) and aorta-axillary bypass graft (2023), DVTs (s/p IVC now removed 9/2023), HTN, hemorrhagic pancreatitis (followed by Dr. Solano), seizure disorder, presents for anemia (Hb 8) found on outpatient labs. He has been feeling weak and lightheaded, found to be in symptomatic anemia w hgb of 5.9. Pt received 1u of transfusion and responded well. Significant labs included schistocytes on peripheral smear, elevated reticulocytes, elevated LDH and low haptoglobin. Admitted to medicine for further work up of hemolytic anemia.    During hospital course, TTE was ordered to assess the placement of aortic valve and showed bioprosthetic valve noted in the aortic position, reviewed by CT surgery and stable for discharge with plan to f/u outpatient in 6 weeks. Pt did undergo BARBIE 10/8 for further evaluation of valve which appeared to be functioning normally, no AR.    Cardiology was consulted to manage antihypertensives and pt was switched from amlodipine 10mg qd (home rx) to nifedipine 30mg qd and hydralazine 25mg qd. Pt was transferred to tele service for titration of hypertensive medications until normotensive.     Epilepsy team started following pt as well 2/2 3 sec of staring spell noted by spouse on 10/5; s/p vEEG. Discussed with neurologist (CRISTIAN Hernandez), increase Eslicarbazepine 800mg po HS to 1200mg QHS (2000).      Medicine following for anemia, Hgb has remained stable. Pt will need close follow up with hematologist outpatient within 1-2 weeks.     Pt able to ambulate and void without complication. VSS. Labs and telemetry reviewed. Pt is a candidate for discharge per Dr. Navarro. Pt given appropriate discharge instructions, pt states they have an appropriate amount of their previous home meds unchanged from this visit at home, and any new medications were sent to their pharmacy. Pt instructed to f/u with PCP within one week and Dr. Mcdaniel within in 1-2 weeks.    GLP-1 receptor agonist/SGLT2 inhibitor meds discussed w/ patients and encouraged to discuss further with PMD or Endocrinologist at next visit.      Pt discharge copies detail cardiovascular history, medications, testing/treatments, OR patient has created a patient portal account and instructed to provide their records at their 1st appointment.    Discharge cardiac medications:  o Hydralazine 25 mg TID  o Nifedipine XL 30 mg QD  o Aspirin 81 mg QD  o Atorvastatin 20 mg QD    Other discharge medications:  o clindamycin 300 mg q6hrs (after dental procedure)  o eslicarbazepine 1200 milliGRAM(s) Oral at bedtime  o lacosamide 200 milliGRAM(s) Oral every 12 hours  o pantoprazole 40 mg QD

## 2024-10-07 NOTE — PROGRESS NOTE ADULT - PROBLEM SELECTOR PLAN 6
Per pt prior to hospitalization, last seizure was about 4 months ago, absent seizure  - Pt states he did not take his seizure medication on the night he was admitted to the hospital, concern for absent seizure during hospital stay, vEEG with no seizures but shows indicators of right temporal epileptiform potential, d/c 10/7   -check Aptiom level prior to next administration (ordered for AM lab draw)  -check Lacosamide level prior to next administration (STAT lab draw ordered prior to 8pm dose)  -c/w Lacosamide 200mg po BID (0800 and 2000)  -c/w Aptiom 800mg po HS  (2000)  - Follow-up with outpatient neurologist (Dr Patel in 4 - 6 weeks) Hx of DVTs, was previously on Eliquis, IVC filter placed and eventually removed 9/2023  - ?Hematoma present at incision site on L thigh, soft currently but per patient was was hard in the past, has been present since IVC filter removal in 9/2023  - US to assess Hx of DVTs, was previously on Eliquis, IVC filter placed and eventually removed 9/2023  - Doppler LLE 8/20/24: no LLE DVT  - ?Hematoma present at incision site on L thigh, soft currently but per patient was was hard in the past, has been present since IVC filter removal in 9/2023  - US to assess

## 2024-10-07 NOTE — PROGRESS NOTE ADULT - SUBJECTIVE AND OBJECTIVE BOX
HPI  54y Male w/ HTN, Aortic dissection, CAD, Seizure, Pancreatitis, PE. Sent in byhis outpatient Urologist for low hemoglobin and admitted to medicine for Anemia. Hgb 5.9 s/p 1U pRBC. Concerns for hemolysis 2/2 mechanical AV now being followed by CTS. 10/05 Wife reported 3-5s staring spells. She stated that these events took place in mid conversation and he would then continue as if nothing had happened however, does not recalled what was said or the contents of the conversation. She stated that this is the third event. First was 6/2024 and second 09/11/24. Both times spouse is unsure if medications was missed. He stated that he feels that there is lost in time. He does not recall any other occurrence other than the first 2 his wife reported and does not remember yesterdays episode. He also stated that he does not feel he took his morning dose of his lacosamide yesterday, as he woke up to find the pill in his hand and wasn't sure what it was.     Last seizure reported and documented from [facility] was 02/14-15 in which he had 3 witnessed generalized seizure. 1 episode w/ post ictal confusion and bladder incontinence. At this time he was on monotherapy Lacosamide 200mg BID. No events or epileptiform activity were captured on EEG. He was discharged on Lacosamide 200mg BID and Levetiracetam 750mg BID. On follow up with Dr. Patel he was transition of Levetiracetam to Eslicarbazepine 800mg po HS. No seizure since other than reported events as stated above.        Epilepsy risk factors: None   Head injury with subsequent LOC?: None  Febrile seizures in infancy?: None   Hx of CNS infection?: None   Family hx of epilepsy?: None  Known CNS pathology?: None        EPILEPSY PROGRESS NOTE:  No events overnight.  Complaints of headache pt believes to be associated with vEEG wrap.     REVIEW OF SYSTEMS:  As above, otherwise negative for constitutional/HEENT/CV/pulm/GI//MSK/neuro/derm/endocrine/psych.    MEDICATIONS:   MEDICATIONS  (STANDING):  aspirin  chewable 81 milliGRAM(s) Oral daily  atorvastatin 20 milliGRAM(s) Oral at bedtime  clindamycin   Capsule 300 milliGRAM(s) Oral every 6 hours  eslicarbazepine 800 milliGRAM(s) Oral daily  hydrALAZINE 25 milliGRAM(s) Oral every 8 hours  lacosamide 200 milliGRAM(s) Oral every 12 hours  multivitamin 1 Tablet(s) Oral daily  NIFEdipine XL 30 milliGRAM(s) Oral every 24 hours  pantoprazole    Tablet 40 milliGRAM(s) Oral before breakfast    MEDICATIONS  (PRN):  acetaminophen     Tablet .. 975 milliGRAM(s) Oral every 8 hours PRN Moderate Pain (4 - 6)  acetaminophen     Tablet .. 650 milliGRAM(s) Oral every 6 hours PRN Temp greater or equal to 38C (100.4F), Mild Pain (1 - 3)  aluminum hydroxide/magnesium hydroxide/simethicone Suspension 30 milliLiter(s) Oral every 4 hours PRN Dyspepsia  melatonin 3 milliGRAM(s) Oral at bedtime PRN Insomnia  ondansetron Injectable 4 milliGRAM(s) IV Push every 8 hours PRN Nausea and/or Vomiting      VITAL SIGNS:  T(C): 37.1 (10-07-24 @ 05:38), Max: 37.2 (10-06-24 @ 17:24)  HR: 62 (10-07-24 @ 07:05) (57 - 65)  BP: 207/86 (10-07-24 @ 07:05) (148/74 - 207/86)  RR: 18 (10-07-24 @ 07:05) (18 - 18)  SpO2: 99% (10-07-24 @ 07:05) (97% - 99%)  Wt(kg): --    PHYSICAL EXAM:  General Adult Exam  GENERAL APPEARANCE: Well developed, well nourished, alert and cooperative, and appears to be in no acute distress.  HEENT: C/o headache believed to be associated with vEEG head wrap, Mucous membranes moist, no signs of erythema discharge from nose, ears, eyes.   CARDIAC: High frequency S1 and S2 mechanical AV. No S3, S4 or murmurs. Rhythm is regular. There is no peripheral edema, cyanosis or pallor. Extremities are warm and well perfused.   LUNGS: Clear to auscultation and percussion without rales, rhonchi, wheezing or diminished breath sounds.  ABDOMEN: Positive bowel sounds. Soft, nondistended, nontender. No guarding or rebound. No masses.  MUSCULOSKELETAL: Normal muscular development.  EXTREMITIES: No significant deformity or joint abnormality. No edema. Well perfused, warm.   SKIN: Skin normal color, texture and turgor with no lesions or eruptions.    Neurological Exam:  Mental Status: Orientated to self, date and place.  Attention intact.  No dysarthria, aphasia or neglect.  Knowledge intact.  Registration intact.  Short and long term memory grossly intact.      Cranial Nerves: CN I - not tested.  PERRL, EOMI, VFF, no nystagmus or diplopia.  CN V1-3 intact to light touch.  No facial asymmetry.  Hearing intact to finger rub bilaterally.  Tongue, uvula and palate midline.  Sternocleidomastoid and Trapezius intact bilaterally.    Motor:   Tone: normal.                  Strength:      Upper extremity                      Delt       Bicep    Tricep                                                  R         5/5        5/5        5/5       5/5                                               L          5/5        5/5        5/5       5/5   Lower extremity                       HF          KE          KF        DF         PF                                               R        5/5        5/5        5/5       5/5       5/5                                               L         5/5        5/5       5/5       5/5        5/5  Pronator drift: none                 Dysmetria: None to finger-nose-finger or heel-shin-heel  No truncal ataxia.    Tremor: No resting, postural or action tremor.  No myoclonus.    Sensation: intact to light touch, pinprick, vibration and proprioception    Deep Tendon Reflexes: 1+ bilateral biceps, triceps, brachioradialis, knee and ankle  Toes flexor bilaterally    Gait: Not able to assess        LABS:  CBC Full  -  ( 07 Oct 2024 05:30 )  WBC Count : 6.34 K/uL  RBC Count : 3.00 M/uL  Hemoglobin : 9.0 g/dL  Hematocrit : 29.2 %  Platelet Count - Automated : 232 K/uL  Mean Cell Volume : 97.3 fl  Mean Cell Hemoglobin : 30.0 pg  Mean Cell Hemoglobin Concentration : 30.8 gm/dL  Auto Neutrophil # : 3.59 K/uL  Auto Lymphocyte # : 1.73 K/uL  Auto Monocyte # : 0.77 K/uL  Auto Eosinophil # : 0.17 K/uL  Auto Basophil # : 0.06 K/uL  Auto Neutrophil % : 56.7 %  Auto Lymphocyte % : 27.3 %  Auto Monocyte % : 12.1 %  Auto Eosinophil % : 2.7 %  Auto Basophil % : 0.9 %    10-07    139  |  104  |  14  ----------------------------<  76  3.8   |  26  |  0.86    Ca    8.8      07 Oct 2024 05:30  Phos  3.6     10-07  Mg     2.0     10-07    TPro  6.4  /  Alb  3.6  /  TBili  1.0  /  DBili  x   /  AST  64[H]  /  ALT  26  /  AlkPhos  114  10-07    LIVER FUNCTIONS - ( 07 Oct 2024 05:30 )  Alb: 3.6 g/dL / Pro: 6.4 g/dL / ALK PHOS: 114 U/L / ALT: 26 U/L / AST: 64 U/L / GGT: x           PT/INR - ( 06 Oct 2024 05:30 )   PT: 13.4 sec;   INR: 1.17                EEG:  Findings:  Day 1:  10/5/2024, 3:35:38 PM to next morning at 07:00 AM   Background:  continuous, with predominantly alpha and beta frequencies.  Generalized Slowing:  None  Symmetry/Focality: No persistent asymmetries of voltage or frequency.        Voltage:  Normal (20+ uV)  Organization:  Appropriate anterior-posterior gradient  Posterior Dominant Rhythm:  9 Hz symmetric, well-organized, and well-modulated  Sleep:  Symmetric, synchronous spindles and K complexes.  Variability:   Yes		Reactivity:  Yes    Spontaneous Activity:  Occasional ( >1/hr < 1/min) spikes over right temporal region     Events:  1)	No electrographic seizures or significant clinical events occurred during this study.  Provocations:  •	Hyperventilation: was not performed.  •	Photic stimulation: was not performed.  Daily Summary:    1)	Occasional ( >1/hr < 1/min) spikes over right temporal region suggestive of underlying epileptic potential   EPILEPSY PROGRESS NOTE:  HPI  54y Male w/ HTN, Aortic dissection, CAD, Seizure, Pancreatitis, PE who was sent in by his outpatient Urologist for low hemoglobin and admitted to medicine for Anemia (Hgb 5.9 s/p 1U pRBC). Concerns for hemolysis 2/2 mechanical AV now being followed by CTS. 10/05 Wife reported 3-5s staring spells. She stated that these events took place in mid conversation and he would then continue as if nothing had happened however, does not recalled what was said or the contents of the conversation. She stated that this is the third event. First was 6/2024 and second 09/11/24. Both times spouse is unsure if medications was missed. He stated that he feels that there is lost in time. He does not recall any other occurrence other than the first 2 his wife reported and does not remember yesterdays episode. He also stated that he does not feel he took his morning dose of his lacosamide yesterday, as he woke up to find the pill in his hand and wasn't sure what it was.     Last seizure reported and documented from [facility] was 02/14-15 in which he had 3 witnessed generalized seizure. 1 episode w/ post ictal confusion and bladder incontinence. At this time he was on monotherapy Lacosamide 200mg BID. No events or epileptiform activity were captured on EEG. He was discharged on Lacosamide 200mg BID and Levetiracetam 750mg BID. On follow up with Dr. Patel he was transition of Levetiracetam to Eslicarbazepine 800mg po HS. No seizure since other than reported events as stated above.     Epilepsy seen and examined patient at bedside, and there were no events overnight. Complaints of headache pt believes to be associated with vEEG wrap.      Epilepsy risk factors: None   Head injury with subsequent LOC?: None  Febrile seizures in infancy?: None   Hx of CNS infection?: None   Family hx of epilepsy?: None  Known CNS pathology?: None    REVIEW OF SYSTEMS:  CARDIOVASCULAR:  No chest pain, chest pressure or chest discomfort. No palpitations or edema.  RESPIRATORY:  No shortness of breath, cough or sputum.  GASTROINTESTINAL:  No anorexia, nausea, vomiting or diarrhea. No abdominal pain or blood.  GENITOURINARY: No Burning on urination.   NEUROLOGICAL:  see HPI    MEDICATIONS:   MEDICATIONS  (STANDING):  aspirin  chewable 81 milliGRAM(s) Oral daily  atorvastatin 20 milliGRAM(s) Oral at bedtime  clindamycin   Capsule 300 milliGRAM(s) Oral every 6 hours  eslicarbazepine 800 milliGRAM(s) Oral daily  hydrALAZINE 25 milliGRAM(s) Oral every 8 hours  lacosamide 200 milliGRAM(s) Oral every 12 hours  multivitamin 1 Tablet(s) Oral daily  NIFEdipine XL 30 milliGRAM(s) Oral every 24 hours  pantoprazole    Tablet 40 milliGRAM(s) Oral before breakfast    MEDICATIONS  (PRN):  acetaminophen     Tablet .. 975 milliGRAM(s) Oral every 8 hours PRN Moderate Pain (4 - 6)  acetaminophen     Tablet .. 650 milliGRAM(s) Oral every 6 hours PRN Temp greater or equal to 38C (100.4F), Mild Pain (1 - 3)  aluminum hydroxide/magnesium hydroxide/simethicone Suspension 30 milliLiter(s) Oral every 4 hours PRN Dyspepsia  melatonin 3 milliGRAM(s) Oral at bedtime PRN Insomnia  ondansetron Injectable 4 milliGRAM(s) IV Push every 8 hours PRN Nausea and/or Vomiting    VITAL SIGNS:  T(C): 37.1 (10-07-24 @ 05:38), Max: 37.2 (10-06-24 @ 17:24)  HR: 62 (10-07-24 @ 07:05) (57 - 65)  BP: 207/86 (10-07-24 @ 07:05) (148/74 - 207/86)  RR: 18 (10-07-24 @ 07:05) (18 - 18)  SpO2: 99% (10-07-24 @ 07:05) (97% - 99%)  Wt(kg): --    PHYSICAL EXAM:  GENERAL APPEARANCE: Well developed, well nourished, alert and cooperative, and appears to be in no acute distress.  HEENT: C/o headache believed to be associated with vEEG head wrap, Mucous membranes moist, no signs of erythema discharge from nose, ears, eyes.   CARDIAC: High frequency S1 and S2 mechanical AV. No S3, S4 or murmurs. Rhythm is regular. There is no peripheral edema, cyanosis or pallor. Extremities are warm and well perfused.   LUNGS: Clear to auscultation and percussion without rales, rhonchi, wheezing or diminished breath sounds.  ABDOMEN: Positive bowel sounds. Soft, nondistended, nontender. No guarding or rebound. No masses.  MUSCULOSKELETAL: Normal muscular development.  EXTREMITIES: No significant deformity or joint abnormality. No edema. Well perfused, warm.   SKIN: Skin normal color, texture and turgor with no lesions or eruptions.    Neurological Exam:  Mental Status: Alert and orientated to self, date and place.  Attention intact.  No dysarthria, aphasia or neglect.  Knowledge intact.  Registration intact.  Short and long term memory grossly intact Recall 2/3 (3rd with prompt).      Cranial Nerves: CN I - not tested.  PERRLA 3mm brisk, EOMI, VFF, no nystagmus or diplopia.  CN V1-3 intact to light touch.  No facial asymmetry.  Hearing intact to finger rub bilaterally.  Tongue, uvula and palate midline.  Sternocleidomastoid and Trapezius intact bilaterally.    Motor:   Tone: normal.                  Strength:      Upper extremity                      Delt       Bicep    Tricep                                                  R         5/5        5/5        5/5       5/5                                               L          5/5        5/5        5/5       5/5   Lower extremity                       HF          KE          KF        DF         PF                                               R        5/5        5/5        5/5       5/5       5/5                                               L         5/5        5/5       5/5       5/5        5/5  Pronator drift: none                 Sensation: intact to light touch  Deep Tendon Reflexes: 1+ bilateral biceps, triceps, brachioradialis, knee and ankle  Toes flexor bilaterally  Gait: Not able to assess    LABS:  CBC Full  -  ( 07 Oct 2024 05:30 )  WBC Count : 6.34 K/uL  RBC Count : 3.00 M/uL  Hemoglobin : 9.0 g/dL  Hematocrit : 29.2 %  Platelet Count - Automated : 232 K/uL  Mean Cell Volume : 97.3 fl  Mean Cell Hemoglobin : 30.0 pg  Mean Cell Hemoglobin Concentration : 30.8 gm/dL  Auto Neutrophil # : 3.59 K/uL  Auto Lymphocyte # : 1.73 K/uL  Auto Monocyte # : 0.77 K/uL  Auto Eosinophil # : 0.17 K/uL  Auto Basophil # : 0.06 K/uL  Auto Neutrophil % : 56.7 %  Auto Lymphocyte % : 27.3 %  Auto Monocyte % : 12.1 %  Auto Eosinophil % : 2.7 %  Auto Basophil % : 0.9 %    10-07    139  |  104  |  14  ----------------------------<  76  3.8   |  26  |  0.86    Ca    8.8      07 Oct 2024 05:30  Phos  3.6     10-07  Mg     2.0     10-07    TPro  6.4  /  Alb  3.6  /  TBili  1.0  /  DBili  x   /  AST  64[H]  /  ALT  26  /  AlkPhos  114  10-07    LIVER FUNCTIONS - ( 07 Oct 2024 05:30 )  Alb: 3.6 g/dL / Pro: 6.4 g/dL / ALK PHOS: 114 U/L / ALT: 26 U/L / AST: 64 U/L / GGT: x           PT/INR - ( 06 Oct 2024 05:30 )   PT: 13.4 sec;   INR: 1.17       EEG:  Findings:  Day 1:  10/5/2024, 3:35:38 PM to next morning at 07:00 AM   Background:  continuous, with predominantly alpha and beta frequencies.  Generalized Slowing:  None  Symmetry/Focality: No persistent asymmetries of voltage or frequency.        Voltage:  Normal (20+ uV)  Organization:  Appropriate anterior-posterior gradient  Posterior Dominant Rhythm:  9 Hz symmetric, well-organized, and well-modulated  Sleep:  Symmetric, synchronous spindles and K complexes.  Variability:   Yes		Reactivity:  Yes    Spontaneous Activity:  Occasional ( >1/hr < 1/min) spikes over right temporal region     Events:  1)	No electrographic seizures or significant clinical events occurred during this study.  Provocations:  •	Hyperventilation: was not performed.  •	Photic stimulation: was not performed.    EEG Daily Summary (10/7/24):    1)	Occasional ( >1/hr < 1/min) spikes over right temporal region suggestive of underlying epileptic potential      Final Clinical Correlation:  1)	There were indicators of  right temporal epileptic potential       Dary Flores MD  Attending Neurologist, [facility]  Electronic Signatures:  aDry Flores (MD)  (Signed 07-Oct-2024 10:24)  	Authored: EEG REPORT

## 2024-10-07 NOTE — DISCHARGE NOTE PROVIDER - ATTENDING DISCHARGE PHYSICAL EXAMINATION:
General: Alert and oriented x 3. No acute distress.   HEENT: Moist mucous membranes. Anicteric. VEEG.   Cardiovascular: Regular rate and rhythm. (+) Murmur  Lungs: Clear to auscultation bilaterally. No accessory muscle use.  Abdomen: Soft, non-tender and non-distended. No palpable masses.  Extremities: No edema. Non-tender. Warm.  Neurologic: No apparent focal neurological deficits. CN II-XII grossly intact, but not individually tested.  Psychiatric: Cooperative. Appropriate mood and affect.

## 2024-10-07 NOTE — DISCHARGE NOTE PROVIDER - PROVIDER TOKENS
PROVIDER:[TOKEN:[[id number]],FOLLOWUP:[2 weeks],ESTABLISHEDPATIENT:[T]],PROVIDER:[TOKEN:[[id number]],FOLLOWUP:[1 week],ESTABLISHEDPATIENT:[T]]

## 2024-10-07 NOTE — DISCHARGE NOTE PROVIDER - NSDCMRMEDTOKEN_GEN_ALL_CORE_FT
amLODIPine 10 mg oral tablet: 1 tab(s) orally once a day  Aspirin Enteric Coated 81 mg oral delayed release tablet: 1 tab(s) orally once a day  atorvastatin 20 mg oral tablet: 1 tab(s) orally once a day (at bedtime)  bisoprolol 10 mg oral tablet: 1 tab(s) orally once a day  lacosamide 200 mg oral tablet: 1 tab(s) orally 2 times a day MDD: 2 tabs  Multiple Vitamins oral tablet: 1 tab(s) orally once a day  pantoprazole 40 mg oral delayed release tablet: 1 tab(s) orally once a day (before a meal)  Toprol- mg oral tablet, extended release: 1 tab(s) orally once a day   Aspirin Enteric Coated 81 mg oral delayed release tablet: 1 tab(s) orally once a day  atorvastatin 20 mg oral tablet: 1 tab(s) orally once a day (at bedtime)  clindamycin 300 mg oral capsule: 1 cap(s) orally every 6 hours  eslicarbazepine 600 mg oral tablet: 2 tab(s) orally once a day  hydrALAZINE 25 mg oral tablet: 1 tab(s) orally every 8 hours  lacosamide 200 mg oral tablet: 1 tab(s) orally every 12 hours  Multiple Vitamins oral tablet: 1 tab(s) orally once a day  NIFEdipine 30 mg oral tablet, extended release: 1 tab(s) orally every 24 hours  pantoprazole 40 mg oral delayed release tablet: 1 tab(s) orally once a day (before a meal)

## 2024-10-07 NOTE — PROGRESS NOTE ADULT - ATTENDING COMMENTS
54M seizures, AVR following dissection of aorta, presenting with symptomatic anemia due to likely mechanical shearing hemolytic anemia.     Labs and imaging reviewed    Problem List  #Symptomatic Anemia  #Mechanical Hemolytic Anemia  #Aortic Valve Replacement  #Epilepsy  #HTN Urgency    Plan  -Awaiting Direct marga  -Pending TTE  -Iron Panel pending to ensure adequate repletion   -HTN control today, goal <180 sys  -Would stop vEEG today    Rest as above

## 2024-10-07 NOTE — PROGRESS NOTE ADULT - PROBLEM SELECTOR PLAN 8
Lipid panel in AM  - continue atorvastatin 40 mg    F: None  E: Replete if K<4 or Mag<2  N: DASH Diet, NPO after MN for BARBIE  GIppx: protonix 40 mg  VTEppx: SCDs  Dispo: [facility] Lipid panel in AM  - continue atorvastatin 40 mg    #Routine dental procedure on 10/1   - was prescribed Clindamycin 300 q6 for two weeks, still taking    F: None  E: Replete if K<4 or Mag<2  N: DASH Diet, NPO after MN for BARBIE  GIppx: protonix 40 mg  VTEppx: SCDs  Dispo: [facility]

## 2024-10-07 NOTE — DISCHARGE NOTE PROVIDER - NSDCFUSCHEDAPPT_GEN_ALL_CORE_FT
[facility] [address]  Scheduled Appointment: 11/22/2024     Elvis Pierre  [facility]  CTSURG [address]  Scheduled Appointment: 11/20/2024    [facility]  [facility]  FAMILYMED [address]  Scheduled Appointment: 11/22/2024

## 2024-10-07 NOTE — PROGRESS NOTE ADULT - PROBLEM SELECTOR PLAN 7
Lipid panel in AM  - continue atorvastatin 40 mg Hx of CABG x1 (SVG-RCA) in 2023  - Trop T 19 in ED, EKG nonischemic  - one episode of CP relieved after tylenol, no recurrence  - continue ASA 81 mg, atorvastatin 20 mg

## 2024-10-07 NOTE — DISCHARGE NOTE PROVIDER - NSDCCPCAREPLAN_GEN_ALL_CORE_FT
PRINCIPAL DISCHARGE DIAGNOSIS  Diagnosis: Anemia  Assessment and Plan of Treatment: You were admitted for anemia. Your hemoglobin was extremely low on arrival at 5.9 which has improved during your hospital stay after one transfusion of blood. Your Hgb has remained stable since, your discharge Hgb level is 9.4. You must follow up with your PCP for repeat CBC in one week. You should also follow up with a hematologist for further testing and workup of anemia.      SECONDARY DISCHARGE DIAGNOSES  Diagnosis: History of aortic valve replacement with bioprosthetic valve  Assessment and Plan of Treatment: You have a history of aortic valve replacement bioprosthetic implanted by Dr. Pierre. You underwent echocardiogram which revealed your valve appeared functioning normally and you also had a transesophageal echocardiogram which also revealed normal functioning valve.   Continue aspirin 81 mg daily.  You should follow up with Dr. Pierre on 11/20/24 at 11.30am, [address] [place] [address], tel [phone].    Diagnosis: Epilepsy  Assessment and Plan of Treatment: You have a history of epilepsy. The epilepsy team evaluated you and you had an EEG which revealed seizure activity. There was discussion with your outpatient neurologist and we increased your eslicarbazepine 1200mg QHS. Continue your lacosamide 200 mg twice daily.    Diagnosis: HLD (hyperlipidemia)  Assessment and Plan of Treatment: Please continue atorvastatin 20 mg daily at bedtime to keep your cholesterol low. High cholesterol contributes to heart disease.      Diagnosis: Hypertension  Assessment and Plan of Treatment: Please continue nifedipine 30 mg daily and hydralazine 25 mg three times daily as listed to keep your blood pressure controlled. Please discontinue amlodipine 10 mg daily. For blood pressure that is too high or too low please see your doctor or go to the emergency room as necessary.

## 2024-10-07 NOTE — PROGRESS NOTE ADULT - SUBJECTIVE AND OBJECTIVE BOX
Progress Note  INCOMPLETE NOTE    INTERVAL EVENTS:   No acute events overnight.    SUBJECTIVE:   Patient seen and examined at bedside. Condition largely unchanged from yesterday. No acute complaints at this time.    ROS:  Negative unless otherwise stated above.    PHYSICAL EXAM:  General: Alert and oriented x 3. No acute distress.   HEENT: Moist mucous membranes. Anicteric. No cervical lymphadenopathy.  Cardiovascular: Regular rate and rhythm. No murmur. Normal JVP.  Lungs: Clear to auscultation bilaterally. No accessory muscle use.  Abdomen: Soft, non-tender and non-distended. No palpable masses.  Extremities: No edema. Non-tender. Warm.  Skin: No rashes or lesions.   Neurologic: No apparent focal neurological deficits. CN II-XII grossly intact, but not individually tested.  Psychiatric: Cooperative. Appropriate mood and affect.    VITAL SIGNS:  Vital Signs Last 24 Hrs  T(C): 37.2 (06 Oct 2024 20:38), Max: 37.2 (06 Oct 2024 06:15)  T(F): 98.9 (06 Oct 2024 20:38), Max: 99 (06 Oct 2024 17:24)  HR: 65 (06 Oct 2024 20:38) (55 - 65)  BP: 148/74 (06 Oct 2024 20:38) (148/74 - 167/78)  BP(mean): 99 (06 Oct 2024 20:38) (99 - 99)  RR: 18 (06 Oct 2024 20:38) (18 - 18)  SpO2: 98% (06 Oct 2024 20:38) (97% - 99%)    Parameters below as of 06 Oct 2024 20:38  Patient On (Oxygen Delivery Method): room air      INPATIENT MEDICATIONS:   MEDICATIONS  (STANDING):  amLODIPine   Tablet 10 milliGRAM(s) Oral every 24 hours  aspirin  chewable 81 milliGRAM(s) Oral daily  atorvastatin 20 milliGRAM(s) Oral at bedtime  clindamycin   Capsule 300 milliGRAM(s) Oral every 6 hours  eslicarbazepine 800 milliGRAM(s) Oral daily  hydrALAZINE 10 milliGRAM(s) Oral every 8 hours  lacosamide 200 milliGRAM(s) Oral every 12 hours  multivitamin 1 Tablet(s) Oral daily  pantoprazole    Tablet 40 milliGRAM(s) Oral before breakfast    MEDICATIONS  (PRN):  acetaminophen     Tablet .. 975 milliGRAM(s) Oral every 8 hours PRN Moderate Pain (4 - 6)  acetaminophen     Tablet .. 650 milliGRAM(s) Oral every 6 hours PRN Temp greater or equal to 38C (100.4F), Mild Pain (1 - 3)  aluminum hydroxide/magnesium hydroxide/simethicone Suspension 30 milliLiter(s) Oral every 4 hours PRN Dyspepsia  melatonin 3 milliGRAM(s) Oral at bedtime PRN Insomnia  ondansetron Injectable 4 milliGRAM(s) IV Push every 8 hours PRN Nausea and/or Vomiting    HOME MEDICATIONS  amLODIPine 10 mg oral tablet: 1 tab(s) orally once a day  Aspirin Enteric Coated 81 mg oral delayed release tablet: 1 tab(s) orally once a day  atorvastatin 20 mg oral tablet: 1 tab(s) orally once a day (at bedtime)  bisoprolol 10 mg oral tablet: 1 tab(s) orally once a day  lacosamide 200 mg oral tablet: 1 tab(s) orally 2 times a day MDD: 2 tabs  Multiple Vitamins oral tablet: 1 tab(s) orally once a day  pantoprazole 40 mg oral delayed release tablet: 1 tab(s) orally once a day (before a meal)  Toprol- mg oral tablet, extended release: 1 tab(s) orally once a day    ALLERGIES:  Allergies    No Known Allergies    Intolerances    LABS:                       9.1    6.85  )-----------( 169      ( 06 Oct 2024 05:30 )             29.3     10-06    138  |  102  |  14  ----------------------------<  84  3.8   |  26  |  0.84    Ca    9.0      06 Oct 2024 05:30  Phos  3.8     10-06  Mg     1.9     10-06    TPro  6.8  /  Alb  3.9  /  TBili  1.0  /  DBili  x   /  AST  61[H]  /  ALT  29  /  AlkPhos  120  10-06    PT/INR - ( 06 Oct 2024 05:30 )   PT: 13.4 sec;   INR: 1.17            Urinalysis Basic - ( 06 Oct 2024 05:30 )    Color: x / Appearance: x / SG: x / pH: x  Gluc: 84 mg/dL / Ketone: x  / Bili: x / Urobili: x   Blood: x / Protein: x / Nitrite: x   Leuk Esterase: x / RBC: x / WBC x   Sq Epi: x / Non Sq Epi: x / Bacteria: x      CAPILLARY BLOOD GLUCOSE        RADIOLOGY & ADDITIONAL TESTS: Reviewed. Progress Note    Hospital Course  Patient is a 54 year old male with PMHx of pancreatitis, aortic dissection (status post aortic bifurcation bypass graft disease, EVAR), CAD (status post CABG 5/2023), DVT lower extremities, hypertension, pulmonary embolism, keratitis (status postsurgery), seizure disorder, presents for anemia (Hb 8) found on outpatient labs. He has been feeling weak and lightheaded, found to be in symptomatic anemia w hgb of 5.9. Pt received 1u of transfusion and responded well. Significant labs included schistocytes on peripheral smear, elevated reticulocytes, RBC/blood in UA. TTE was ordered to assess the placement of aortic valve and showed bioprosthetic valve noted in the aortic position, reviewed by CT surgery. During the stay, cardiology was consulted to manage antihypertensive iso h/o aortic dissection and pt was switched from amlodipine 10mg qd (home rx) to nifedipine 30mg qd and hydralazine 25mg qd. Epilepsy team started following pt as well 2/2 3 sec of staring spell noted by spouse on 10/5; s/p vEEG. No change in epilepsy rx.     INTERVAL EVENTS:   C/o tension HA, which relieved after tylenol 975mg and ibuprofen 400mg.     SUBJECTIVE:   Patient seen and examined at bedside. Pt c/o HA, which has begun since vEEG was started. Pt would like this to be taken off. Also experiencing nausea. Denies vomiting.     ROS:  Negative unless otherwise stated above.    PHYSICAL EXAM:  General: Alert and oriented x 3. No acute distress.   HEENT: Moist mucous membranes. Anicteric.    Cardiovascular: Regular rate and rhythm. No murmur.    Lungs: Clear to auscultation bilaterally. No accessory muscle use.  Abdomen: Soft, non-tender and non-distended. No palpable masses.  Extremities: No edema. Non-tender. Warm.  Skin: No rashes or lesions.   Neurologic: No apparent focal neurological deficits. CN II-XII grossly intact, but not individually tested.  Psychiatric: Cooperative. Appropriate mood and affect.    VITAL SIGNS:  Vital Signs Last 24 Hrs  T(C): 37.2 (06 Oct 2024 20:38), Max: 37.2 (06 Oct 2024 06:15)  T(F): 98.9 (06 Oct 2024 20:38), Max: 99 (06 Oct 2024 17:24)  HR: 65 (06 Oct 2024 20:38) (55 - 65)  BP: 148/74 (06 Oct 2024 20:38) (148/74 - 167/78)  BP(mean): 99 (06 Oct 2024 20:38) (99 - 99)  RR: 18 (06 Oct 2024 20:38) (18 - 18)  SpO2: 98% (06 Oct 2024 20:38) (97% - 99%)    Parameters below as of 06 Oct 2024 20:38  Patient On (Oxygen Delivery Method): room air      INPATIENT MEDICATIONS:   MEDICATIONS  (STANDING):  amLODIPine   Tablet 10 milliGRAM(s) Oral every 24 hours  aspirin  chewable 81 milliGRAM(s) Oral daily  atorvastatin 20 milliGRAM(s) Oral at bedtime  clindamycin   Capsule 300 milliGRAM(s) Oral every 6 hours  eslicarbazepine 800 milliGRAM(s) Oral daily  hydrALAZINE 10 milliGRAM(s) Oral every 8 hours  lacosamide 200 milliGRAM(s) Oral every 12 hours  multivitamin 1 Tablet(s) Oral daily  pantoprazole    Tablet 40 milliGRAM(s) Oral before breakfast    MEDICATIONS  (PRN):  acetaminophen     Tablet .. 975 milliGRAM(s) Oral every 8 hours PRN Moderate Pain (4 - 6)  acetaminophen     Tablet .. 650 milliGRAM(s) Oral every 6 hours PRN Temp greater or equal to 38C (100.4F), Mild Pain (1 - 3)  aluminum hydroxide/magnesium hydroxide/simethicone Suspension 30 milliLiter(s) Oral every 4 hours PRN Dyspepsia  melatonin 3 milliGRAM(s) Oral at bedtime PRN Insomnia  ondansetron Injectable 4 milliGRAM(s) IV Push every 8 hours PRN Nausea and/or Vomiting    HOME MEDICATIONS  amLODIPine 10 mg oral tablet: 1 tab(s) orally once a day  Aspirin Enteric Coated 81 mg oral delayed release tablet: 1 tab(s) orally once a day  atorvastatin 20 mg oral tablet: 1 tab(s) orally once a day (at bedtime)  bisoprolol 10 mg oral tablet: 1 tab(s) orally once a day  lacosamide 200 mg oral tablet: 1 tab(s) orally 2 times a day MDD: 2 tabs  Multiple Vitamins oral tablet: 1 tab(s) orally once a day  pantoprazole 40 mg oral delayed release tablet: 1 tab(s) orally once a day (before a meal)  Toprol- mg oral tablet, extended release: 1 tab(s) orally once a day    ALLERGIES:  Allergies    No Known Allergies    Intolerances    LABS:                       9.1    6.85  )-----------( 169      ( 06 Oct 2024 05:30 )             29.3     10-06    138  |  102  |  14  ----------------------------<  84  3.8   |  26  |  0.84    Ca    9.0      06 Oct 2024 05:30  Phos  3.8     10-06  Mg     1.9     10-06    TPro  6.8  /  Alb  3.9  /  TBili  1.0  /  DBili  x   /  AST  61[H]  /  ALT  29  /  AlkPhos  120  10-06    PT/INR - ( 06 Oct 2024 05:30 )   PT: 13.4 sec;   INR: 1.17            Urinalysis Basic - ( 06 Oct 2024 05:30 )    Color: x / Appearance: x / SG: x / pH: x  Gluc: 84 mg/dL / Ketone: x  / Bili: x / Urobili: x   Blood: x / Protein: x / Nitrite: x   Leuk Esterase: x / RBC: x / WBC x   Sq Epi: x / Non Sq Epi: x / Bacteria: x      CAPILLARY BLOOD GLUCOSE        RADIOLOGY & ADDITIONAL TESTS: Reviewed.

## 2024-10-07 NOTE — PROGRESS NOTE ADULT - PROBLEM SELECTOR PLAN 5
Hx of CABG x1 (SVG-RCA) in 2023  - Trop T 19 in ED, EKG Hx of CABG x1 (SVG-RCA) in 2023  - Trop T 19 in ED, EKG nonischemic  - continue ASA 81 mg, atorvastatin 20 mg TTE 10/7 showing chordal SHAJI w/o LVOT obstruction, probably moderate MR  - cMRI tonight to assess for HOCM

## 2024-10-07 NOTE — PROGRESS NOTE ADULT - PROBLEM SELECTOR PLAN 4
TTE 10/7 showing chordal SHAJI w/o LVOT obstruction, probably moderate MR  - cMRI tonight to assess for HOCM Per pt prior to hospitalization, last seizure was about 4 months ago, absent seizure  - Pt states he did not take his seizure medication on the night he was admitted to the hospital, concern for absent seizure on 10/5, vEEG with no seizures but shows indicators of right temporal epileptiform potential, d/c 10/7   - check Aptiom level prior to next administration  - check Lacosamide level prior to next administration   - c/w Lacosamide 200mg po BID (08:00 and 20:00)  - c/w Aptiom 800mg po HS  (20:00)  - Follow-up with outpatient neurologist Dr Patel in 4 - 6 weeks Per pt prior to hospitalization, last seizure was about 4 months ago, absent seizure  - Pt states he did not take his seizure medication on the night he was admitted to the hospital, concern for absent seizure on 10/5, vEEG with no seizures but shows indicators of right temporal epileptiform potential, d/c 10/7   - Aptiom level (oxcarbazepine level) prior to next administration  - Lacosamide level prior to next administration   - continue Lacosamide 200mg po BID (08:00 and 20:00)  - continue Aptiom 800mg po QHS  (20:00)  - Follow-up with outpatient neurologist Dr Patel in 4 - 6 weeks

## 2024-10-07 NOTE — PROGRESS NOTE ADULT - ASSESSMENT
Assessment   54 year old Male with PMH  HTN, Aortic dissection, CAD, DVT lower extremities, Pancreatitis, PE, and known seizure disorder, seen at bedside comfortably sitting in bed, alert and following commands, for follow up of seizure episode on 10/5/24. 10/5/24 Spouse reported episode of blank stare, EEG was initiated. Epilepsy consulted for witnessed blank stare.  Pt reported no events overnight ......PENDING EEG REPORT ....... vEEG d/claude 10/7/24.       Plan  -vEEG d/c'ed 10/7/24  -check Aptiom level prior to next administration  -check Lacosamide level prior to next administration  -c/w Lacosamide 200mg po BID (0800 and 2000)  -c/w Aptiom 800mg po HS  (2000)  -Keep K > 4, Mg > 2 and Phos > 2.5  -Maintain Seizure and aspiration risk precaution    Disposition  Admitted to [facility] for further management and monitoring, rest of care covered by medicine team as D/w Epileptologist and Medicine Team  Assessment   54 year-old-Male with PMH of HTN, Aortic dissection, CAD, DVT lower extremities, Pancreatitis, PE, and known seizure disorder who was admitted on 10/4/24 for symptomatic anemia s/p 1U PRBCs. Course complicated by seizure episode on 10/5 witnessed by his wife. Unclear etiology for breakthrough seizure (blank stare with eye flutter), but potentially related to metabolic derangement given no current focal deficits, and EEG with no seizures but shows indicators of right temporal epileptiform potential.     Plan  -vEEG d/c'ed 10/7/24  -check Aptiom level prior to next administration  -check Lacosamide level prior to next administration  -c/w Lacosamide 200mg po BID (0800 and 2000)  -c/w Aptiom 800mg po HS  (2000)  -Keep K > 4, Mg > 2 and Phos > 2.5  -Maintain Seizure and aspiration risk precaution  - Follow-up with outpatient neurologist (Dr Patel in 4 - 6 weeks)  - Please inform/educate patient of the seizure safety precautions prior to discharge and include in discharge note, see below    Disposition  Admitted to [facility] for further management and monitoring, rest of care covered by medicine team as D/w Epileptologist and Medicine Team     Seizure Safety Instructions  1. [facility] law mandates you to self-report your seizure disorder to DMV, and be seizure-free for 1yr before you can drive.   2. Avoid swimming, diving, taking a bath, cooking, use of motarized machineries.  3. Avoid climbing a ladder, trees or any height.  4. Use machines with safety switches.  5. Always be aware of your surroundings and make sure family and friends are aware of your seizures.  6. Use non-breakable dishes.  7. Consider wearing a medical bracelet to inform people you have epilepsy in case of an emergency.

## 2024-10-07 NOTE — DISCHARGE NOTE PROVIDER - CARE PROVIDER_API CALL
[facility]  Cardiovascular Disease  [address] [place] [address], [address]  [place] [address]  Phone: [phone]  Fax: [phone]  Established Patient  Follow Up Time: 2 weeks    [facility]  Hematology  [address], [address]  [place] [address]  Phone: [phone]  Fax: [phone]  Established Patient  Follow Up Time: 1 week

## 2024-10-07 NOTE — PROGRESS NOTE ADULT - PROBLEM SELECTOR PLAN 4
CAD (status post CABG 5/2023). Takes Aspirin 81 mg at home  - resume ASA 81 given low concern for bleed CAD (status post CABG 5/2023). Takes Aspirin 81 mg at home  - c/w ASA 81 given low concern for bleed

## 2024-10-08 ENCOUNTER — RESULT REVIEW (OUTPATIENT)
Age: 54
End: 2024-10-08

## 2024-10-08 ENCOUNTER — TRANSCRIPTION ENCOUNTER (OUTPATIENT)
Age: 54
End: 2024-10-08

## 2024-10-08 VITALS
OXYGEN SATURATION: 99 % | SYSTOLIC BLOOD PRESSURE: 148 MMHG | DIASTOLIC BLOOD PRESSURE: 69 MMHG | HEART RATE: 68 BPM | TEMPERATURE: 97 F | RESPIRATION RATE: 18 BRPM

## 2024-10-08 LAB
A1C WITH ESTIMATED AVERAGE GLUCOSE RESULT: 4.2 % — SIGNIFICANT CHANGE UP (ref 4–5.6)
ADD ON TEST-SPECIMEN IN LAB: SIGNIFICANT CHANGE UP
ADD ON TEST-SPECIMEN IN LAB: SIGNIFICANT CHANGE UP
ALBUMIN SERPL ELPH-MCNC: 3.9 G/DL — SIGNIFICANT CHANGE UP (ref 3.3–5)
ALP SERPL-CCNC: 116 U/L — SIGNIFICANT CHANGE UP (ref 40–120)
ALT FLD-CCNC: 25 U/L — SIGNIFICANT CHANGE UP (ref 10–45)
ANION GAP SERPL CALC-SCNC: 10 MMOL/L — SIGNIFICANT CHANGE UP (ref 5–17)
AST SERPL-CCNC: 59 U/L — HIGH (ref 10–40)
BILIRUB SERPL-MCNC: 0.9 MG/DL — SIGNIFICANT CHANGE UP (ref 0.2–1.2)
BLD GP AB SCN SERPL QL: NEGATIVE — SIGNIFICANT CHANGE UP
BUN SERPL-MCNC: 15 MG/DL — SIGNIFICANT CHANGE UP (ref 7–23)
CALCIUM SERPL-MCNC: 9 MG/DL — SIGNIFICANT CHANGE UP (ref 8.4–10.5)
CHLORIDE SERPL-SCNC: 106 MMOL/L — SIGNIFICANT CHANGE UP (ref 96–108)
CHOLEST SERPL-MCNC: 134 MG/DL — SIGNIFICANT CHANGE UP
CO2 SERPL-SCNC: 26 MMOL/L — SIGNIFICANT CHANGE UP (ref 22–31)
CREAT SERPL-MCNC: 0.93 MG/DL — SIGNIFICANT CHANGE UP (ref 0.5–1.3)
EGFR: 97 ML/MIN/1.73M2 — SIGNIFICANT CHANGE UP
ESTIMATED AVERAGE GLUCOSE: 74 MG/DL — SIGNIFICANT CHANGE UP (ref 68–114)
GLUCOSE SERPL-MCNC: 80 MG/DL — SIGNIFICANT CHANGE UP (ref 70–99)
HAPTOGLOB SERPL-MCNC: <10 MG/DL — LOW (ref 34–200)
HCT VFR BLD CALC: 30.6 % — LOW (ref 39–50)
HDLC SERPL-MCNC: 49 MG/DL — SIGNIFICANT CHANGE UP
HGB BLD-MCNC: 9.4 G/DL — LOW (ref 13–17)
LACOSAMIDE (VIMPAT) RESULT: 8.95 UG/ML — SIGNIFICANT CHANGE UP (ref 1–10)
LDH SERPL L TO P-CCNC: 1328 U/L — HIGH (ref 50–242)
LIPID PNL WITH DIRECT LDL SERPL: 67 MG/DL — SIGNIFICANT CHANGE UP
MAGNESIUM SERPL-MCNC: 2.2 MG/DL — SIGNIFICANT CHANGE UP (ref 1.6–2.6)
MCHC RBC-ENTMCNC: 28.8 PG — SIGNIFICANT CHANGE UP (ref 27–34)
MCHC RBC-ENTMCNC: 30.7 GM/DL — LOW (ref 32–36)
MCV RBC AUTO: 93.9 FL — SIGNIFICANT CHANGE UP (ref 80–100)
NON HDL CHOLESTEROL: 85 MG/DL — SIGNIFICANT CHANGE UP
NRBC # BLD: 0 /100 WBCS — SIGNIFICANT CHANGE UP (ref 0–0)
PLATELET # BLD AUTO: 189 K/UL — SIGNIFICANT CHANGE UP (ref 150–400)
POTASSIUM SERPL-MCNC: 4.5 MMOL/L — SIGNIFICANT CHANGE UP (ref 3.5–5.3)
POTASSIUM SERPL-SCNC: 4.5 MMOL/L — SIGNIFICANT CHANGE UP (ref 3.5–5.3)
PROT SERPL-MCNC: 6.7 G/DL — SIGNIFICANT CHANGE UP (ref 6–8.3)
RBC # BLD: 3.26 M/UL — LOW (ref 4.2–5.8)
RBC # FLD: 24.2 % — HIGH (ref 10.3–14.5)
RH IG SCN BLD-IMP: POSITIVE — SIGNIFICANT CHANGE UP
SODIUM SERPL-SCNC: 142 MMOL/L — SIGNIFICANT CHANGE UP (ref 135–145)
TRIGL SERPL-MCNC: 97 MG/DL — SIGNIFICANT CHANGE UP
WBC # BLD: 5.89 K/UL — SIGNIFICANT CHANGE UP (ref 3.8–10.5)
WBC # FLD AUTO: 5.89 K/UL — SIGNIFICANT CHANGE UP (ref 3.8–10.5)

## 2024-10-08 PROCEDURE — 76377 3D RENDER W/INTRP POSTPROCES: CPT | Mod: 26

## 2024-10-08 PROCEDURE — 93325 DOPPLER ECHO COLOR FLOW MAPG: CPT | Mod: 26

## 2024-10-08 PROCEDURE — 99233 SBSQ HOSP IP/OBS HIGH 50: CPT

## 2024-10-08 PROCEDURE — 93320 DOPPLER ECHO COMPLETE: CPT | Mod: 26

## 2024-10-08 PROCEDURE — 93312 ECHO TRANSESOPHAGEAL: CPT | Mod: 26

## 2024-10-08 PROCEDURE — 99239 HOSP IP/OBS DSCHRG MGMT >30: CPT

## 2024-10-08 RX ORDER — HYDRALAZINE HYDROCHLORIDE 100 MG/1
1 TABLET ORAL
Qty: 90 | Refills: 0
Start: 2024-10-08 | End: 2024-11-06

## 2024-10-08 RX ORDER — LACOSAMIDE 10 MG/ML
1 SOLUTION ORAL
Qty: 0 | Refills: 0 | DISCHARGE
Start: 2024-10-08

## 2024-10-08 RX ORDER — ESLICARBAZEPINE ACETATE 600 MG/1
1200 TABLET ORAL DAILY
Refills: 0 | Status: DISCONTINUED | OUTPATIENT
Start: 2024-10-08 | End: 2024-10-08

## 2024-10-08 RX ORDER — LACOSAMIDE 10 MG/ML
1 SOLUTION ORAL
Qty: 60 | Refills: 0
Start: 2024-10-08 | End: 2024-11-06

## 2024-10-08 RX ORDER — BISOPROLOL FUMARATE 5 MG/1
1 TABLET, FILM COATED ORAL
Refills: 0 | DISCHARGE

## 2024-10-08 RX ORDER — ESLICARBAZEPINE ACETATE 600 MG/1
2 TABLET ORAL
Qty: 60 | Refills: 0
Start: 2024-10-08 | End: 2024-11-06

## 2024-10-08 RX ORDER — ASPIRIN 325 MG
1 TABLET ORAL
Qty: 30 | Refills: 0
Start: 2024-10-08 | End: 2024-11-06

## 2024-10-08 RX ORDER — CLINDAMYCIN PHOSPHATE 150 MG/ML
1 INJECTION, SOLUTION INTRAVENOUS
Qty: 0 | Refills: 0 | DISCHARGE
Start: 2024-10-08

## 2024-10-08 RX ORDER — ATORVASTATIN CALCIUM 10 MG/1
1 TABLET, FILM COATED ORAL
Qty: 30 | Refills: 0
Start: 2024-10-08 | End: 2024-11-06

## 2024-10-08 RX ADMIN — HYDRALAZINE HYDROCHLORIDE 25 MILLIGRAM(S): 100 TABLET ORAL at 13:27

## 2024-10-08 RX ADMIN — MULTI VITAMIN/MINERAL SUPPLEMENT WITH ASCORBIC ACID, NIACIN, PYRIDOXINE, PANTOTHENIC ACID, FOLIC ACID, RIBOFLAVIN, THIAMIN, BIOTIN, COBALAMIN AND ZINC. 1 TABLET(S): 60; 20; 12.5; 10; 10; 1.7; 1.5; 1; .3; .006 TABLET, COATED ORAL at 13:27

## 2024-10-08 RX ADMIN — LACOSAMIDE 200 MILLIGRAM(S): 10 SOLUTION ORAL at 07:38

## 2024-10-08 RX ADMIN — Medication 30 MILLIGRAM(S): at 06:30

## 2024-10-08 RX ADMIN — PANTOPRAZOLE SODIUM 40 MILLIGRAM(S): 40 TABLET, DELAYED RELEASE ORAL at 06:30

## 2024-10-08 RX ADMIN — CLINDAMYCIN PHOSPHATE 300 MILLIGRAM(S): 150 INJECTION, SOLUTION INTRAVENOUS at 13:27

## 2024-10-08 RX ADMIN — HYDRALAZINE HYDROCHLORIDE 25 MILLIGRAM(S): 100 TABLET ORAL at 06:30

## 2024-10-08 RX ADMIN — Medication 81 MILLIGRAM(S): at 13:27

## 2024-10-08 RX ADMIN — CLINDAMYCIN PHOSPHATE 300 MILLIGRAM(S): 150 INJECTION, SOLUTION INTRAVENOUS at 02:12

## 2024-10-08 RX ADMIN — CLINDAMYCIN PHOSPHATE 300 MILLIGRAM(S): 150 INJECTION, SOLUTION INTRAVENOUS at 07:38

## 2024-10-08 NOTE — PROGRESS NOTE ADULT - ASSESSMENT
54 year-old-Male with PMH of HTN, Aortic dissection, CAD, DVT lower extremities, Pancreatitis, PE, and known seizure disorder who was admitted on 10/4/24 for symptomatic anemia s/p 1U PRBCs. Course complicated by seizure episode on 10/5 witnessed by his wife. Unclear etiology for breakthrough seizure (blank stare with eye flutter), but potentially related to metabolic derangement given no current focal deficits, and vEEG from 10/7 shows Occasional spikes over right temporal region suggestive of underlying epileptic potential.    Plan:  -vEEG d/c'ed 10/7/24  - Follow up on Lacosamide and Eslicarbazepine level  -Continue Lacosamide 200mg po BID (0800 and 2000)  -Continue Eslicarbazepine 800mg po HS  (2000)  -Keep K > 4, Mg > 2 and Phos > 2.5  -Maintain Seizure and aspiration risk precaution  - Follow-up with outpatient neurologist (Dr Patel in 4 - 6 weeks)  - Please inform/educate patient of the seizure safety precautions prior to discharge and include in discharge note, see below  - Rest of care covered by medicine team       Seizure Safety Instructions  1. [facility] law mandates you to self-report your seizure disorder to DM, and be seizure-free for 1yr before you can drive.   2. Avoid swimming, diving, taking a bath, cooking, use of motarized machineries.  3. Avoid climbing a ladder, trees or any height.  4. Use machines with safety switches.  5. Always be aware of your surroundings and make sure family and friends are aware of your seizures.  6. Use non-breakable dishes.  7. Consider wearing a medical bracelet to inform people you have epilepsy in case of an emergency.       Case discussed with Dr. Flores. Epilepsy signing off   54 year-old-Male with PMH of HTN, Aortic dissection, CAD, DVT lower extremities, Pancreatitis, PE, and known seizure disorder who was admitted on 10/4/24 for symptomatic anemia s/p 1U PRBCs. Course complicated by seizure episode on 10/5 witnessed by his wife. Unclear etiology for breakthrough seizure (blank stare with eye flutter), but potentially related to metabolic derangement given no current focal deficits, and vEEG from 10/7 shows Occasional spikes over right temporal region suggestive of underlying epileptic potential.    Plan:  -vEEG d/c'ed 10/7/24  - Follow up on Lacosamide and Eslicarbazepine level  -Continue Lacosamide 200mg po BID (0800 and 2000)  -Continue Eslicarbazepine 800mg po HS  (2000)  -Keep K > 4, Mg > 2 and Phos > 2.5  -Maintain Seizure and aspiration risk precaution  - Follow-up with outpatient neurologist (Dr Patel in 4 - 6 weeks)  - Please inform/educate patient of the seizure safety precautions prior to discharge and include in discharge note, see below  - Rest of care covered by medicine team       Seizure Safety Instructions  1. [facility] law mandates you to self-report your seizure disorder to DM, and be seizure-free for 1yr before you can drive.   2. Avoid swimming, diving, taking a bath, cooking, use of motarized machineries.  3. Avoid climbing a ladder, trees or any height.  4. Use machines with safety switches.  5. Always be aware of your surroundings and make sure family and friends are aware of your seizures.  6. Use non-breakable dishes.  7. Consider wearing a medical bracelet to inform people you have epilepsy in case of an emergency.       Case discussed with Dr. Flores   54 year-old-Male with PMH of HTN, Aortic dissection, CAD, DVT lower extremities, Pancreatitis, PE, and known seizure disorder who was admitted on 10/4/24 for symptomatic anemia s/p 1U PRBCs. Course complicated by seizure episode on 10/5 witnessed by his wife. Unclear etiology for breakthrough seizure (blank stare with eye flutter), but potentially related to metabolic derangement given no current focal deficits, and vEEG from 10/7 shows Occasional spikes over right temporal region suggestive of underlying epileptic potential.    Plan:  -vEEG d/c'ed 10/7/24  - Follow up on Lacosamide and oxcarbazepine level  -Continue Lacosamide 200mg po BID (0800 and 2000)  -Please increase Eslicarbazepine 800mg po HS  to 1200mg QHS (2000) as per outpatient neurologist (CRISTIAN Hernandez).   -Keep K > 4, Mg > 2 and Phos > 2.5  -Maintain Seizure and aspiration risk precaution  - Follow-up with outpatient neurologist (Dr Patel in 4 - 6 weeks)  - Please inform/educate patient of the seizure safety precautions prior to discharge and include in discharge note, see below  - Rest of care covered by medicine team       Seizure Safety Instructions  1. [facility] law mandates you to self-report your seizure disorder to DMV, and be seizure-free for 1yr before you can drive.   2. Avoid swimming, diving, taking a bath, cooking, use of motarized machineries.  3. Avoid climbing a ladder, trees or any height.  4. Use machines with safety switches.  5. Always be aware of your surroundings and make sure family and friends are aware of your seizures.  6. Use non-breakable dishes.  7. Consider wearing a medical bracelet to inform people you have epilepsy in case of an emergency.       Case discussed with Dr. Flores

## 2024-10-08 NOTE — PROGRESS NOTE ADULT - NS ATTEND AMEND GEN_ALL_CORE FT
History of epilepsy with suspected focal seizure  Patient seen and evaluated 10/7. No complaints  EEG occasional with right temporal discharges  Will reach out to outpatient neurologist
Patient seen and evaluated.  Doing well. No seizures overnight  History of epilepsy with breakthrough focal seizures  NP spoke to outpatient neurologist today who recommended increasing aptiom to 1200mg/nightly  Informed patient and wife- both agreeable with plan.  Seizure precautions

## 2024-10-08 NOTE — PROGRESS NOTE ADULT - PROBLEM SELECTOR PLAN 4
Per pt prior to hospitalization, last seizure was about 4 months ago, absent seizure  - Pt states he did not take his seizure medication on the night he was admitted to the hospital, concern for absent seizure on 10/5, vEEG with no seizures but shows indicators of right temporal epileptiform potential, d/c 10/7   - Aptiom level (oxcarbazepine level) prior to next administration  - Lacosamide level prior to next administration   - continue Lacosamide 200mg po BID (08:00 and 20:00)  - continue Aptiom 800mg po QHS  (20:00)  - Follow-up with outpatient neurologist Dr Patel in 4 - 6 weeks

## 2024-10-08 NOTE — PROGRESS NOTE ADULT - PROBLEM/PLAN-4
DISPLAY PLAN FREE TEXT
numerical 0-10
DISPLAY PLAN FREE TEXT

## 2024-10-08 NOTE — PROGRESS NOTE ADULT - PROBLEM SELECTOR PLAN 1
SBP on arrival to /77, s/p pRBC transfusion SBP has been elevated, peak 207/86, 190/84 (both taken prior to BP meds were given)  - Per pt at home he occasionally takes BP records, usually his SBP is in 120s  - Pt reporting headaches which he has daily but states they have been worse during this admission, denies any vision or speech changes. He had one episode of squeezing CP last night lasting 1 hour that alleviated after 1 dose of tylenol 975 mg was given, no recurrence  - Home BP meds: amlodipine 10mg qd, bisoprolol 10mg qd, hctz 12.5mg qd  - Current BP regimen: Nifedipine 30 mg QD, hydralazine 25 mg q8 hours. BB held for bradycardia  - Keep pt normotensive in setting of hx of aortic dissection

## 2024-10-08 NOTE — PROGRESS NOTE ADULT - PROBLEM SELECTOR PLAN 7
Hx of CABG x1 (SVG-RCA) in 2023  - Trop T 19 in ED, EKG nonischemic  - one episode of CP relieved after tylenol, no recurrence  - continue ASA 81 mg, atorvastatin 20 mg

## 2024-10-08 NOTE — PROGRESS NOTE ADULT - PROBLEM SELECTOR PLAN 3
Bioprosthetic AVR 3/2023 w/ Dr. Pierre  - concern for hemolytic anemia 2/2 AVR  - TTE (10/7/24): LVEF 65%, mod concentric LVH, normal RV size and systolic function. severely dilated LA. Bioprosthetic valve in aortic position. Peak transvalvular velocity 3.59 m/s, mean transvalvular gradient 25.00 mmHg, LVOT/AV velocity ratio is 0.49. The peak transaortic gradient is 51.55 mmHg. no evidence of AR. Chordal SHAJI present w/o LVOT obstruction. Probable moderate MR. Moderate TR. pHTN, PASP 54. Compared to the previous TTE performed on 6/14/2023, the transaortic gradients are higher on the current study. Apical aneurysm is not appreciated on the current study  - Plan for BARBIE and cMRI in AM to further assess AVR

## 2024-10-08 NOTE — PROGRESS NOTE ADULT - PROBLEM SELECTOR PLAN 2
Pt endorsing 4 months of progressive weakness and dizziness, hgb in ED 5.9   - s/p 1 unit pRBC on 10/4, hgb today 9.0  - pt states he is feeling much better post transfusion despite having elevated BPs  - Sergey test negative, BARBIE in AM to reassess AVR shearing RBCs causing hemolytic anemia  - maintain Hb>7, active type & screen  - consider heme consult pending BARBIE results

## 2024-10-08 NOTE — PROGRESS NOTE ADULT - SUBJECTIVE AND OBJECTIVE BOX
Cardiology PA Adult Progress Note    SUBJECTIVE ASSESSMENT:  	  MEDICATIONS:  hydrALAZINE 25 milliGRAM(s) Oral every 8 hours  NIFEdipine XL 30 milliGRAM(s) Oral every 24 hours    clindamycin   Capsule 300 milliGRAM(s) Oral every 6 hours      acetaminophen     Tablet .. 975 milliGRAM(s) Oral every 8 hours PRN  eslicarbazepine 800 milliGRAM(s) Oral at bedtime  lacosamide 200 milliGRAM(s) Oral every 12 hours  melatonin 3 milliGRAM(s) Oral at bedtime PRN  ondansetron Injectable 4 milliGRAM(s) IV Push every 8 hours PRN    aluminum hydroxide/magnesium hydroxide/simethicone Suspension 30 milliLiter(s) Oral every 4 hours PRN  pantoprazole    Tablet 40 milliGRAM(s) Oral before breakfast    atorvastatin 20 milliGRAM(s) Oral at bedtime    aspirin  chewable 81 milliGRAM(s) Oral daily  multivitamin 1 Tablet(s) Oral daily    	  VITAL SIGNS:  T(C): 36.1 (10-08-24 @ 08:38), Max: 37.3 (10-07-24 @ 13:50)  HR: 65 (10-08-24 @ 06:33) (56 - 65)  BP: 153/73 (10-08-24 @ 06:33) (146/70 - 190/84)  RR: 18 (10-08-24 @ 06:33) (18 - 18)  SpO2: 100% (10-08-24 @ 06:33) (99% - 100%)  Wt(kg): --    I&O's Summary    07 Oct 2024 07:01  -  08 Oct 2024 07:00  --------------------------------------------------------  IN: 180 mL / OUT: 0 mL / NET: 180 mL                                           PHYSICAL EXAM:  Appearance: Normal	  HEENT: Normal oral mucosa, PERRL, EOMI	  Neck: Supple, + JVD/ - JVD; ___ Carotid Bruit   Cardiovascular: Normal S1 S2, No murmurs  Respiratory: Lungs clear to auscultation/Decreased Breath Sounds/No Rales, Rhonchi, Wheezing	  Gastrointestinal:  Soft, Non-tender, + BS	  Skin: No rashes, No ecchymoses, No cyanosis  Extremities: Normal range of motion, No clubbing, cyanosis or edema  Vascular: Peripheral pulses palpable 2+ bilaterally  Neurologic: Non-focal  Psychiatry: A & O x 3, Mood & affect appropriate    LABS:	 	                                  9.4    5.89  )-----------( 189      ( 08 Oct 2024 08:18 )             30.6     10-07    139  |  104  |  14  ----------------------------<  76  3.8   |  26  |  0.86    Ca    8.8      07 Oct 2024 05:30  Phos  3.6     10-07  Mg     2.0     10-07    TPro  6.4  /  Alb  3.6  /  TBili  1.0  /  DBili  x   /  AST  64[H]  /  ALT  26  /  AlkPhos  114  10-07    proBNP:   Lipid Profile:   HgA1c:   TSH:

## 2024-10-08 NOTE — PROGRESS NOTE ADULT - PROBLEM SELECTOR PLAN 8
Lipid panel in AM  - continue atorvastatin 40 mg    #Routine dental procedure on 10/1   - was prescribed Clindamycin 300 q6 for two weeks, still taking    F: None  E: Replete if K<4 or Mag<2  N: DASH Diet, NPO after MN for BARBIE  GIppx: protonix 40 mg  VTEppx: SCDs  Dispo: 5uris

## 2024-10-08 NOTE — PROGRESS NOTE ADULT - PROBLEM SELECTOR PLAN 6
Hx of DVTs, was previously on Eliquis, IVC filter placed and eventually removed 9/2023  - Doppler LLE 8/20/24: no LLE DVT  - ?Hematoma present at incision site on L thigh, soft currently but per patient was was hard in the past, has been present since IVC filter removal in 9/2023  - US to assess

## 2024-10-08 NOTE — DISCHARGE NOTE NURSING/CASE MANAGEMENT/SOCIAL WORK - PATIENT PORTAL LINK FT
You can access the FollowMyHealth Patient Portal offered by [facility] by registering at the following website: [email or web address]. By joining [employer]’s FollowMyHealth portal, you will also be able to view your health information using other applications (apps) compatible with our system.

## 2024-10-08 NOTE — PROGRESS NOTE ADULT - TIME BILLING
coordination of care
Bedside exam and interview   Reviewed vitals, labs   Discussed patient's plan of care with house staff   Documentation of encounter  Excludes teaching time and/or separately reported services
Bedside exam and interview   Reviewed vitals, labs   Discussed patient's plan of care with house staff   Documentation of encounter  Excludes teaching time and/or separately reported services
Bedside exam and interview   Reviewed vitals, labs   Discussed patient's plan of care with housestaff   Documentation of encounter    Time documented on encounter excludes teaching and separately reported services

## 2024-10-08 NOTE — PROGRESS NOTE ADULT - PROBLEM SELECTOR PLAN 5
TTE 10/7 showing chordal SHAJI w/o LVOT obstruction, probably moderate MR  - cMRI tonight to assess for HOCM

## 2024-10-08 NOTE — PROGRESS NOTE ADULT - SUBJECTIVE AND OBJECTIVE BOX
EPILEPSY PROGRESS NOTE:  No events overnight. No complaints currently.    REVIEW OF SYSTEMS:  As above, otherwise negative for constitutional/HEENT/CV/pulm/GI//MSK/neuro/derm/endocrine/psych.    MEDICATIONS:   MEDICATIONS  (STANDING):  aspirin  chewable 81 milliGRAM(s) Oral daily  atorvastatin 20 milliGRAM(s) Oral at bedtime  clindamycin   Capsule 300 milliGRAM(s) Oral every 6 hours  eslicarbazepine 800 milliGRAM(s) Oral at bedtime  hydrALAZINE 25 milliGRAM(s) Oral every 8 hours  lacosamide 200 milliGRAM(s) Oral every 12 hours  multivitamin 1 Tablet(s) Oral daily  NIFEdipine XL 30 milliGRAM(s) Oral every 24 hours  pantoprazole    Tablet 40 milliGRAM(s) Oral before breakfast    MEDICATIONS  (PRN):  acetaminophen     Tablet .. 975 milliGRAM(s) Oral every 8 hours PRN Moderate Pain (4 - 6)  aluminum hydroxide/magnesium hydroxide/simethicone Suspension 30 milliLiter(s) Oral every 4 hours PRN Dyspepsia  melatonin 3 milliGRAM(s) Oral at bedtime PRN Insomnia  ondansetron Injectable 4 milliGRAM(s) IV Push every 8 hours PRN Nausea and/or Vomiting      VITAL SIGNS:  T(C): 36.1 (10-08-24 @ 08:38), Max: 37.3 (10-07-24 @ 13:50)  HR: 79 (10-08-24 @ 08:38) (56 - 79)  BP: 153/81 (10-08-24 @ 08:38) (146/70 - 190/84)  RR: 18 (10-08-24 @ 08:38) (18 - 18)  SpO2: 99% (10-08-24 @ 08:38) (99% - 100%)  Wt(kg): --    PHYSICAL EXAM:  GENERAL APPEARANCE: Awaken to voice. Alert, appears to be in no acute distress.  HEENT:  Mucous membranes moist, no signs of erythema discharge from nose, ears, eyes.   CARDIAC: High frequency S1 and S2 mechanical AV. No S3, S4 or murmurs. Rhythm is regular. There is no peripheral edema, cyanosis or pallor. Extremities are warm and well perfused.   LUNGS: Clear to auscultation and percussion without rales, rhonchi, wheezing or diminished breath sounds.  ABDOMEN: Positive bowel sounds. Soft, nondistended, nontender. No guarding or rebound. No masses.  MUSCULOSKELETAL: Normal muscular development.  EXTREMITIES: No significant deformity or joint abnormality. No edema. Well perfused, warm.   SKIN: Skin normal color, texture and turgor with no lesions or eruptions.    Neurological Exam:  Mental Status: Alert and orientated to self, date and place.  Attention intact.  No dysarthria, aphasia or neglect.  Knowledge intact.  Registration intact.  Short and long term memory grossly intact. Recall 2/3 (3rd with prompt).    Cranial Nerves: CN I - not tested.  PERRLA 3mm brisk, EOMI, VFF, no nystagmus or diplopia.  CN V1-3 intact to light touch.  No facial asymmetry.  Hearing intact to finger rub bilaterally.  Tongue, uvula and palate midline.  Sternocleidomastoid and Trapezius intact bilaterally.  Motor:   Tone: normal.                  Strength:      Upper extremity                      Delt       Bicep    Tricep                                                  R         5/5        5/5        5/5       5/5                                               L          5/5        5/5        5/5       5/5   Lower extremity                       HF          KE          KF        DF         PF                                               R        5/5        5/5        5/5       5/5       5/5                                               L         5/5        5/5       5/5       5/5        5/5  Pronator drift: none                 Sensation: intact to light touch  Deep Tendon Reflexes: 1+ bilateral biceps, triceps, brachioradialis, knee and ankle  Toes flexor bilaterally  Gait: Deferred    LABS:  CBC Full  -  ( 08 Oct 2024 08:18 )  WBC Count : 5.89 K/uL  RBC Count : 3.26 M/uL  Hemoglobin : 9.4 g/dL  Hematocrit : 30.6 %  Platelet Count - Automated : 189 K/uL  Mean Cell Volume : 93.9 fl  Mean Cell Hemoglobin : 28.8 pg  Mean Cell Hemoglobin Concentration : 30.7 gm/dL  Auto Neutrophil # : x  Auto Lymphocyte # : x  Auto Monocyte # : x  Auto Eosinophil # : x  Auto Basophil # : x  Auto Neutrophil % : x  Auto Lymphocyte % : x  Auto Monocyte % : x  Auto Eosinophil % : x  Auto Basophil % : x    10-08    142  |  106  |  15  ----------------------------<  80  4.5   |  26  |  0.93    Ca    9.0      08 Oct 2024 08:18  Phos  3.6     10-07  Mg     2.2     10-08    TPro  6.7  /  Alb  3.9  /  TBili  0.9  /  DBili  x   /  AST  59[H]  /  ALT  25  /  AlkPhos  116  10-08    LIVER FUNCTIONS - ( 08 Oct 2024 08:18 )  Alb: 3.9 g/dL / Pro: 6.7 g/dL / ALK PHOS: 116 U/L / ALT: 25 U/L / AST: 59 U/L / GGT: x               EEG Daily Summary (10/7/24):    1)	Occasional ( >1/hr < 1/min) spikes over right temporal region suggestive of underlying epileptic potential    Final Clinical Correlation:  1)	There were indicators of  right temporal epileptic potential      EPILEPSY PROGRESS NOTE:  No events overnight. No complaints currently.    REVIEW OF SYSTEMS:  CONSTITUTIONAL:  No weight loss, fever, chills, weakness or fatigue.  HEENT:  Eyes:  No visual loss, blurred vision, double vision or yellow sclerae. Ears, Nose, Throat:  No hearing loss, sneezing, congestion, runny nose or sore throat.  SKIN:  No rash or itching.  CARDIOVASCULAR:  No chest pain, chest pressure or chest discomfort. No palpitations or edema.  RESPIRATORY:  No shortness of breath, cough or sputum.  GASTROINTESTINAL:  No anorexia, nausea, vomiting or diarrhea. No abdominal pain or blood.  GENITOURINARY: No Burning on urination.   NEUROLOGICAL:  see HPI  MUSCULOSKELETAL:  No muscle, back pain, joint pain or stiffness.  HEMATOLOGIC:  No anemia, bleeding or bruising.      MEDICATIONS:   MEDICATIONS  (STANDING):  aspirin  chewable 81 milliGRAM(s) Oral daily  atorvastatin 20 milliGRAM(s) Oral at bedtime  clindamycin   Capsule 300 milliGRAM(s) Oral every 6 hours  eslicarbazepine 800 milliGRAM(s) Oral at bedtime  hydrALAZINE 25 milliGRAM(s) Oral every 8 hours  lacosamide 200 milliGRAM(s) Oral every 12 hours  multivitamin 1 Tablet(s) Oral daily  NIFEdipine XL 30 milliGRAM(s) Oral every 24 hours  pantoprazole    Tablet 40 milliGRAM(s) Oral before breakfast    MEDICATIONS  (PRN):  acetaminophen     Tablet .. 975 milliGRAM(s) Oral every 8 hours PRN Moderate Pain (4 - 6)  aluminum hydroxide/magnesium hydroxide/simethicone Suspension 30 milliLiter(s) Oral every 4 hours PRN Dyspepsia  melatonin 3 milliGRAM(s) Oral at bedtime PRN Insomnia  ondansetron Injectable 4 milliGRAM(s) IV Push every 8 hours PRN Nausea and/or Vomiting      VITAL SIGNS:  T(C): 36.1 (10-08-24 @ 08:38), Max: 37.3 (10-07-24 @ 13:50)  HR: 79 (10-08-24 @ 08:38) (56 - 79)  BP: 153/81 (10-08-24 @ 08:38) (146/70 - 190/84)  RR: 18 (10-08-24 @ 08:38) (18 - 18)  SpO2: 99% (10-08-24 @ 08:38) (99% - 100%)  Wt(kg): --    PHYSICAL EXAM:  GENERAL APPEARANCE: Awaken to voice. Alert, appears to be in no acute distress.  HEENT:  Mucous membranes moist, no signs of erythema discharge from nose, ears, eyes.   CARDIAC: High frequency S1 and S2 mechanical AV. No S3, S4 or murmurs. Rhythm is regular. There is no peripheral edema, cyanosis or pallor. Extremities are warm and well perfused.   LUNGS: Clear to auscultation and percussion without rales, rhonchi, wheezing or diminished breath sounds.  ABDOMEN: Positive bowel sounds. Soft, nondistended, nontender. No guarding or rebound. No masses.  MUSCULOSKELETAL: Normal muscular development.  EXTREMITIES: No significant deformity or joint abnormality. No edema. Well perfused, warm.   SKIN: Skin normal color, texture and turgor with no lesions or eruptions.    Neurological Exam:  Mental Status: Alert and orientated to self, date and place.  Attention intact.  No dysarthria, aphasia or neglect.  Knowledge intact.  Registration intact.  Short and long term memory grossly intact. Recall 2/3 (3rd with prompt).    Cranial Nerves: CN I - not tested.  PERRLA 3mm brisk, EOMI, VFF, no nystagmus or diplopia.  CN V1-3 intact to light touch.  No facial asymmetry.  Hearing intact to finger rub bilaterally.  Tongue, uvula and palate midline.  Sternocleidomastoid and Trapezius intact bilaterally.  Motor:   Tone: normal.                  Strength:      Upper extremity                      Delt       Bicep    Tricep                                                  R         5/5        5/5        5/5       5/5                                               L          5/5        5/5        5/5       5/5   Lower extremity                       HF          KE          KF        DF         PF                                               R        5/5        5/5        5/5       5/5       5/5                                               L         5/5        5/5       5/5       5/5        5/5  Pronator drift: none                 Sensation: intact to light touch  Deep Tendon Reflexes: 1+ bilateral biceps, triceps, brachioradialis, knee and ankle  Toes flexor bilaterally  Gait: Deferred    LABS:  CBC Full  -  ( 08 Oct 2024 08:18 )  WBC Count : 5.89 K/uL  RBC Count : 3.26 M/uL  Hemoglobin : 9.4 g/dL  Hematocrit : 30.6 %  Platelet Count - Automated : 189 K/uL  Mean Cell Volume : 93.9 fl  Mean Cell Hemoglobin : 28.8 pg  Mean Cell Hemoglobin Concentration : 30.7 gm/dL  Auto Neutrophil # : x  Auto Lymphocyte # : x  Auto Monocyte # : x  Auto Eosinophil # : x  Auto Basophil # : x  Auto Neutrophil % : x  Auto Lymphocyte % : x  Auto Monocyte % : x  Auto Eosinophil % : x  Auto Basophil % : x    10-08    142  |  106  |  15  ----------------------------<  80  4.5   |  26  |  0.93    Ca    9.0      08 Oct 2024 08:18  Phos  3.6     10-07  Mg     2.2     10-08    TPro  6.7  /  Alb  3.9  /  TBili  0.9  /  DBili  x   /  AST  59[H]  /  ALT  25  /  AlkPhos  116  10-08    LIVER FUNCTIONS - ( 08 Oct 2024 08:18 )  Alb: 3.9 g/dL / Pro: 6.7 g/dL / ALK PHOS: 116 U/L / ALT: 25 U/L / AST: 59 U/L / GGT: x               EEG Daily Summary (10/7/24):    1)	Occasional ( >1/hr < 1/min) spikes over right temporal region suggestive of underlying epileptic potential    Final Clinical Correlation:  1)	There were indicators of  right temporal epileptic potential      EPILEPSY PROGRESS NOTE:  No events overnight. No complaints currently. Patient's outpatient provider, NP Delilah Hernandez was contacted, and informed of patient's hospitalization and seizure event. She provided recommendations for Aptiom to be increased from 800mg at bedtime to 1200mg at bedtime. Her best contact is , and cell phone number is .     REVIEW OF SYSTEMS:  CONSTITUTIONAL:  No weight loss, fever, chills, weakness or fatigue.  HEENT:  Eyes:  No visual loss, blurred vision, double vision or yellow sclerae. Ears, Nose, Throat:  No hearing loss, sneezing, congestion, runny nose or sore throat.  SKIN:  No rash or itching.  CARDIOVASCULAR:  No chest pain, chest pressure or chest discomfort. No palpitations or edema.  RESPIRATORY:  No shortness of breath, cough or sputum.  GASTROINTESTINAL:  No anorexia, nausea, vomiting or diarrhea. No abdominal pain or blood.  GENITOURINARY: No Burning on urination.   NEUROLOGICAL:  see HPI  MUSCULOSKELETAL:  No muscle, back pain, joint pain or stiffness.  HEMATOLOGIC:  No anemia, bleeding or bruising.      MEDICATIONS:   MEDICATIONS  (STANDING):  aspirin  chewable 81 milliGRAM(s) Oral daily  atorvastatin 20 milliGRAM(s) Oral at bedtime  clindamycin   Capsule 300 milliGRAM(s) Oral every 6 hours  eslicarbazepine 800 milliGRAM(s) Oral at bedtime  hydrALAZINE 25 milliGRAM(s) Oral every 8 hours  lacosamide 200 milliGRAM(s) Oral every 12 hours  multivitamin 1 Tablet(s) Oral daily  NIFEdipine XL 30 milliGRAM(s) Oral every 24 hours  pantoprazole    Tablet 40 milliGRAM(s) Oral before breakfast    MEDICATIONS  (PRN):  acetaminophen     Tablet .. 975 milliGRAM(s) Oral every 8 hours PRN Moderate Pain (4 - 6)  aluminum hydroxide/magnesium hydroxide/simethicone Suspension 30 milliLiter(s) Oral every 4 hours PRN Dyspepsia  melatonin 3 milliGRAM(s) Oral at bedtime PRN Insomnia  ondansetron Injectable 4 milliGRAM(s) IV Push every 8 hours PRN Nausea and/or Vomiting      VITAL SIGNS:  T(C): 36.1 (10-08-24 @ 08:38), Max: 37.3 (10-07-24 @ 13:50)  HR: 79 (10-08-24 @ 08:38) (56 - 79)  BP: 153/81 (10-08-24 @ 08:38) (146/70 - 190/84)  RR: 18 (10-08-24 @ 08:38) (18 - 18)  SpO2: 99% (10-08-24 @ 08:38) (99% - 100%)  Wt(kg): --    PHYSICAL EXAM:  GENERAL APPEARANCE: Awaken to voice. Alert, appears to be in no acute distress.  HEENT:  Mucous membranes moist, no signs of erythema discharge from nose, ears, eyes.   CARDIAC: High frequency S1 and S2 mechanical AV. No S3, S4 or murmurs. Rhythm is regular. There is no peripheral edema, cyanosis or pallor. Extremities are warm and well perfused.   LUNGS: Clear to auscultation and percussion without rales, rhonchi, wheezing or diminished breath sounds.  ABDOMEN: Positive bowel sounds. Soft, nondistended, nontender. No guarding or rebound. No masses.  MUSCULOSKELETAL: Normal muscular development.  EXTREMITIES: No significant deformity or joint abnormality. No edema. Well perfused, warm.   SKIN: Skin normal color, texture and turgor with no lesions or eruptions.    Neurological Exam:  Mental Status: Alert and orientated to self, date and place.  Attention intact.  No dysarthria, aphasia or neglect.  Knowledge intact.  Registration intact.  Short and long term memory grossly intact. Recall 2/3 (3rd with prompt).    Cranial Nerves: CN I - not tested.  PERRLA 3mm brisk, EOMI, VFF, no nystagmus or diplopia.  CN V1-3 intact to light touch.  No facial asymmetry.  Hearing intact to finger rub bilaterally.  Tongue, uvula and palate midline.  Sternocleidomastoid and Trapezius intact bilaterally.  Motor:   Tone: normal.                  Strength:      Upper extremity                      Delt       Bicep    Tricep                                                  R         5/5        5/5        5/5       5/5                                               L          5/5        5/5        5/5       5/5   Lower extremity                       HF          KE          KF        DF         PF                                               R        5/5        5/5        5/5       5/5       5/5                                               L         5/5        5/5       5/5       5/5        5/5  Pronator drift: none                 Sensation: intact to light touch  Deep Tendon Reflexes: 1+ bilateral biceps, triceps, brachioradialis, knee and ankle  Toes flexor bilaterally  Gait: Deferred    LABS:  CBC Full  -  ( 08 Oct 2024 08:18 )  WBC Count : 5.89 K/uL  RBC Count : 3.26 M/uL  Hemoglobin : 9.4 g/dL  Hematocrit : 30.6 %  Platelet Count - Automated : 189 K/uL  Mean Cell Volume : 93.9 fl  Mean Cell Hemoglobin : 28.8 pg  Mean Cell Hemoglobin Concentration : 30.7 gm/dL  Auto Neutrophil # : x  Auto Lymphocyte # : x  Auto Monocyte # : x  Auto Eosinophil # : x  Auto Basophil # : x  Auto Neutrophil % : x  Auto Lymphocyte % : x  Auto Monocyte % : x  Auto Eosinophil % : x  Auto Basophil % : x    10-08    142  |  106  |  15  ----------------------------<  80  4.5   |  26  |  0.93    Ca    9.0      08 Oct 2024 08:18  Phos  3.6     10-07  Mg     2.2     10-08    TPro  6.7  /  Alb  3.9  /  TBili  0.9  /  DBili  x   /  AST  59[H]  /  ALT  25  /  AlkPhos  116  10-08    LIVER FUNCTIONS - ( 08 Oct 2024 08:18 )  Alb: 3.9 g/dL / Pro: 6.7 g/dL / ALK PHOS: 116 U/L / ALT: 25 U/L / AST: 59 U/L / GGT: x               EEG Daily Summary (10/7/24):    1)	Occasional ( >1/hr < 1/min) spikes over right temporal region suggestive of underlying epileptic potential    Final Clinical Correlation:  1)	There were indicators of  right temporal epileptic potential

## 2024-10-08 NOTE — PROGRESS NOTE ADULT - ASSESSMENT
55 y/o M w/ PMHx of Type A aortic dissection s/p Dacron Grafting and AV resuspension (2013) and total arch replacement with bioprosthetic AVR (w/ Dr. Pierre, 3/2023), CABG x 1 (SVG-RCA, 5/2023) and aorta-axillary bypass graft (2023), DVTs (s/p IVC now removed 9/2023), HTN, hemorrhagic pancreatitis (followed by Dr. Solano), seizure disorder who presented to [facility] on 10/5 after outpatient labs showed hemoglobin of 8, with repeat in ED showing 5.9 w/  concern for hemolytic anemia, s/p transfusion 1 unit of pRBC, admitted to medicine for w/u. CTS consulted given concern for bioprosthetic valve causing hemolytic anemia, Cardiology consulted for BP control. Plan for BARBIE in AM to further assess bioprosthetic valve, cMRI tonight to work-up possible HOCM given SHAJI on TTE. Patient transferred to cardiac tele for continued BP management while pending results of cMRI and BARBIE.

## 2024-10-10 LAB
LACOSAMIDE (VIMPAT) RESULT: 8.22 UG/ML — SIGNIFICANT CHANGE UP (ref 1–10)
OXCARBAZEPINE SERPL-MCNC: 10 UG/ML — SIGNIFICANT CHANGE UP (ref 10–35)

## 2024-10-14 ENCOUNTER — LABORATORY RESULT (OUTPATIENT)
Age: 54
End: 2024-10-14

## 2024-10-14 ENCOUNTER — APPOINTMENT (OUTPATIENT)
Dept: FAMILY MEDICINE | Facility: CLINIC | Age: 54
End: 2024-10-14
Payer: MEDICARE

## 2024-10-14 VITALS
WEIGHT: 173 LBS | HEART RATE: 68 BPM | DIASTOLIC BLOOD PRESSURE: 66 MMHG | OXYGEN SATURATION: 95 % | SYSTOLIC BLOOD PRESSURE: 125 MMHG | HEIGHT: 72 IN | TEMPERATURE: 97.9 F | BODY MASS INDEX: 23.43 KG/M2

## 2024-10-14 DIAGNOSIS — D64.9 ANEMIA, UNSPECIFIED: ICD-10-CM

## 2024-10-14 DIAGNOSIS — I10 ESSENTIAL (PRIMARY) HYPERTENSION: ICD-10-CM

## 2024-10-14 PROCEDURE — 99214 OFFICE O/P EST MOD 30 MIN: CPT

## 2024-10-14 PROCEDURE — 99496 TRANSJ CARE MGMT HIGH F2F 7D: CPT

## 2024-10-14 PROCEDURE — 36415 COLL VENOUS BLD VENIPUNCTURE: CPT

## 2024-10-15 DIAGNOSIS — R79.89 OTHER SPECIFIED ABNORMAL FINDINGS OF BLOOD CHEMISTRY: ICD-10-CM

## 2024-10-15 LAB
ALBUMIN SERPL ELPH-MCNC: 3.9 G/DL
ALP BLD-CCNC: 118 U/L
ALT SERPL-CCNC: 30 U/L
ANION GAP SERPL CALC-SCNC: 10 MMOL/L
AST SERPL-CCNC: 66 U/L
BASOPHILS # BLD AUTO: 0.07 K/UL
BASOPHILS NFR BLD AUTO: 1 %
BILIRUB SERPL-MCNC: 0.6 MG/DL
BUN SERPL-MCNC: 20 MG/DL
CALCIUM SERPL-MCNC: 9 MG/DL
CHLORIDE SERPL-SCNC: 102 MMOL/L
CO2 SERPL-SCNC: 25 MMOL/L
CREAT SERPL-MCNC: 0.97 MG/DL
EGFR: 92 ML/MIN/1.73M2
EOSINOPHIL # BLD AUTO: 0.29 K/UL
EOSINOPHIL NFR BLD AUTO: 4.3 %
FOLATE SERPL-MCNC: 5.7 NG/ML
GLUCOSE SERPL-MCNC: 94 MG/DL
HCT VFR BLD CALC: 29.4 %
HGB BLD-MCNC: 8.6 G/DL
IMM GRANULOCYTES NFR BLD AUTO: 0.3 %
IRON SATN MFR SERPL: 25 %
IRON SERPL-MCNC: 46 UG/DL
LYMPHOCYTES # BLD AUTO: 2.04 K/UL
LYMPHOCYTES NFR BLD AUTO: 30.1 %
MAN DIFF?: NORMAL
MCHC RBC-ENTMCNC: 29.3 GM/DL
MCHC RBC-ENTMCNC: 29.9 PG
MCV RBC AUTO: 102.1 FL
MONOCYTES # BLD AUTO: 0.77 K/UL
MONOCYTES NFR BLD AUTO: 11.4 %
NEUTROPHILS # BLD AUTO: 3.59 K/UL
NEUTROPHILS NFR BLD AUTO: 52.9 %
PLATELET # BLD AUTO: 297 K/UL
POTASSIUM SERPL-SCNC: 5 MMOL/L
PROT SERPL-MCNC: 6.8 G/DL
RBC # BLD: 2.88 M/UL
RBC # FLD: 24.1 %
SODIUM SERPL-SCNC: 138 MMOL/L
TIBC SERPL-MCNC: 183 UG/DL
UIBC SERPL-MCNC: 137 UG/DL
VIT B12 SERPL-MCNC: >2000 PG/ML
WBC # FLD AUTO: 6.78 K/UL

## 2024-10-18 NOTE — PROGRESS NOTE ADULT - SUBJECTIVE AND OBJECTIVE BOX
SUBJECTIVE: Patient seen and evaluated.        MEDICATIONS  (STANDING):  albumin human 25% IVPB 50 milliLiter(s) IV Intermittent every 30 minutes  ampicillin  IVPB 2 Gram(s) IV Intermittent every 6 hours  aspirin  chewable 81 milliGRAM(s) Oral daily  chlorhexidine 0.12% Liquid 15 milliLiter(s) Oral Mucosa every 12 hours  chlorhexidine 2% Cloths 1 Application(s) Topical daily  cisatracurium Infusion 3 MICROgram(s)/kG/Min (12.6 mL/Hr) IV Continuous <Continuous>  CRRT Treatment    <Continuous>  dexMEDEtomidine Infusion 0.4 MICROgram(s)/kG/Hr (7 mL/Hr) IV Continuous <Continuous>  esmolol  Infusion 25 MICROgram(s)/kG/Min (10.5 mL/Hr) IV Continuous <Continuous>  heparin   Injectable 5000 Unit(s) SubCutaneous every 8 hours  lacosamide IVPB 250 milliGRAM(s) IV Intermittent every 12 hours  meropenem  IVPB 1000 milliGRAM(s) IV Intermittent every 8 hours  midazolam Infusion 0.02 mG/kG/Hr (1.4 mL/Hr) IV Continuous <Continuous>  norepinephrine Infusion 0.05 MICROgram(s)/kG/Min (6.56 mL/Hr) IV Continuous <Continuous>  pantoprazole  Injectable 40 milliGRAM(s) IV Push daily  Parenteral Nutrition - Adult 1 Each (70 mL/Hr) TPN Continuous <Continuous>  propofol Infusion 10 MICROgram(s)/kG/Min (4.2 mL/Hr) IV Continuous <Continuous>  PureFlow Dialysate RFP-400 (K 2 / Ca 3) 5000 milliLiter(s) (2000 mL/Hr) CRRT <Continuous>  sodium chloride 0.9%. 1000 milliLiter(s) (10 mL/Hr) IV Continuous <Continuous>  vasopressin Infusion 0.06 Unit(s)/Min (9 mL/Hr) IV Continuous <Continuous>    MEDICATIONS  (PRN):  fentaNYL    Injectable 25 MICROGram(s) IV Push every 3 hours PRN Severe Pain (7 - 10)      Vital Signs Last 24 Hrs  T(C): 34.7 (23 May 2023 05:11), Max: 38.9 (22 May 2023 08:00)  T(F): 94.5 (23 May 2023 05:11), Max: 102 (22 May 2023 08:00)  HR: 106 (23 May 2023 05:00) (94 - 120)  BP: 139/85 (22 May 2023 11:28) (139/85 - 139/85)  BP(mean): 106 (22 May 2023 11:28) (106 - 106)  RR: 23 (23 May 2023 05:00) (20 - 32)  SpO2: 96% (23 May 2023 05:00) (86% - 100%)    Parameters below as of 23 May 2023 05:00  Patient On (Oxygen Delivery Method): ventilator    O2 Concentration (%): 50    Physical Exam:  Neurologic: Sedated  CV: Normal rate, regular rhythm  Pulm: Breathing comfortably on MV with equal chest rise.  Abd: Mildly distended; mild reaction to palpation despite sedation  : Cantrell  Skin: No rashes  Extremities: No edema.  Psychiatric: Sedated    I&O's Summary    21 May 2023 07:01  -  22 May 2023 07:00  --------------------------------------------------------  IN: 3612.7 mL / OUT: 1470 mL / NET: 2142.7 mL    22 May 2023 07:01  -  23 May 2023 06:53  --------------------------------------------------------  IN: 1911.7 mL / OUT: 3214 mL / NET: -1302.3 mL        LABS:                        9.2    35.90 )-----------( 197      ( 23 May 2023 02:22 )             27.4     05-23    134<L>  |  100  |  68<H>  ----------------------------<  140<H>  4.1   |  23  |  1.23    Ca    8.8      23 May 2023 02:22  Phos  4.3     05-23  Mg     2.2     05-23    TPro  5.8<L>  /  Alb  3.1<L>  /  TBili  7.8<H>  /  DBili  x   /  AST  49<H>  /  ALT  23  /  AlkPhos  67  05-23    PT/INR - ( 23 May 2023 02:22 )   PT: 14.7 sec;   INR: 1.23          PTT - ( 23 May 2023 02:22 )  PTT:35.9 sec    CAPILLARY BLOOD GLUCOSE        LIVER FUNCTIONS - ( 23 May 2023 02:22 )  Alb: 3.1 g/dL / Pro: 5.8 g/dL / ALK PHOS: 67 U/L / ALT: 23 U/L / AST: 49 U/L / GGT: x             RADIOLOGY & ADDITIONAL STUDIES:   show

## 2024-10-23 ENCOUNTER — RX RENEWAL (OUTPATIENT)
Age: 54
End: 2024-10-23

## 2024-10-24 RX ORDER — HYDRALAZINE HYDROCHLORIDE 25 MG/1
25 TABLET ORAL
Qty: 270 | Refills: 0 | Status: ACTIVE | COMMUNITY
Start: 2024-10-14 | End: 1900-01-01

## 2024-10-31 ENCOUNTER — APPOINTMENT (OUTPATIENT)
Dept: ULTRASOUND IMAGING | Facility: HOSPITAL | Age: 54
End: 2024-10-31

## 2024-11-07 ENCOUNTER — APPOINTMENT (OUTPATIENT)
Dept: HEART AND VASCULAR | Facility: CLINIC | Age: 54
End: 2024-11-07
Payer: MEDICARE

## 2024-11-07 VITALS
OXYGEN SATURATION: 99 % | BODY MASS INDEX: 23.98 KG/M2 | WEIGHT: 177 LBS | SYSTOLIC BLOOD PRESSURE: 144 MMHG | DIASTOLIC BLOOD PRESSURE: 69 MMHG | HEART RATE: 68 BPM | HEIGHT: 72 IN

## 2024-11-07 DIAGNOSIS — I10 ESSENTIAL (PRIMARY) HYPERTENSION: ICD-10-CM

## 2024-11-07 DIAGNOSIS — Z86.79 OTHER SPECIFIED POSTPROCEDURAL STATES: ICD-10-CM

## 2024-11-07 DIAGNOSIS — Z98.890 OTHER SPECIFIED POSTPROCEDURAL STATES: ICD-10-CM

## 2024-11-07 DIAGNOSIS — Z01.818 ENCOUNTER FOR OTHER PREPROCEDURAL EXAMINATION: ICD-10-CM

## 2024-11-07 DIAGNOSIS — Z95.2 PRESENCE OF PROSTHETIC HEART VALVE: ICD-10-CM

## 2024-11-07 DIAGNOSIS — Z95.1 PRESENCE OF AORTOCORONARY BYPASS GRAFT: ICD-10-CM

## 2024-11-07 DIAGNOSIS — I71.23 ANEURYSM OF THE DESCENDING THORACIC AORTA, WITHOUT RUPTURE: ICD-10-CM

## 2024-11-07 PROCEDURE — 99214 OFFICE O/P EST MOD 30 MIN: CPT

## 2024-11-07 PROCEDURE — G2211 COMPLEX E/M VISIT ADD ON: CPT

## 2024-11-07 RX ORDER — HYDROCHLOROTHIAZIDE 12.5 MG/1
12.5 TABLET ORAL DAILY
Qty: 90 | Refills: 1 | Status: ACTIVE | COMMUNITY
Start: 2024-11-07 | End: 1900-01-01

## 2024-11-07 RX ORDER — NIFEDIPINE 30 MG/1
30 TABLET, EXTENDED RELEASE ORAL DAILY
Qty: 90 | Refills: 1 | Status: ACTIVE | COMMUNITY
Start: 2024-11-07 | End: 1900-01-01

## 2024-11-07 RX ORDER — ESLICARBAZEPINE ACETATE 600 MG/1
600 TABLET ORAL
Refills: 0 | Status: ACTIVE | COMMUNITY

## 2024-11-07 RX ORDER — ELECTROLYTES/DEXTROSE
SOLUTION, ORAL ORAL DAILY
Qty: 90 | Refills: 3 | Status: ACTIVE | COMMUNITY
Start: 2024-11-07 | End: 1900-01-01

## 2024-11-07 RX ORDER — FOLIC ACID 1 MG/1
1 TABLET ORAL
Refills: 0 | Status: ACTIVE | COMMUNITY

## 2024-11-07 RX ORDER — PREDNISONE 20 MG/1
20 TABLET ORAL
Refills: 0 | Status: ACTIVE | COMMUNITY

## 2024-11-08 ENCOUNTER — TRANSCRIPTION ENCOUNTER (OUTPATIENT)
Age: 54
End: 2024-11-08

## 2024-11-08 PROBLEM — Z01.818 PREOP TESTING: Status: RESOLVED | Noted: 2023-03-08 | Resolved: 2024-11-08

## 2024-11-12 ENCOUNTER — NON-APPOINTMENT (OUTPATIENT)
Age: 54
End: 2024-11-12

## 2024-11-20 ENCOUNTER — APPOINTMENT (OUTPATIENT)
Dept: CARDIOTHORACIC SURGERY | Facility: CLINIC | Age: 54
End: 2024-11-20

## 2024-11-22 ENCOUNTER — APPOINTMENT (OUTPATIENT)
Dept: FAMILY MEDICINE | Facility: CLINIC | Age: 54
End: 2024-11-22

## 2024-11-26 PROCEDURE — 93005 ELECTROCARDIOGRAM TRACING: CPT

## 2024-11-26 PROCEDURE — 93306 TTE W/DOPPLER COMPLETE: CPT

## 2024-11-26 PROCEDURE — 80061 LIPID PANEL: CPT

## 2024-11-26 PROCEDURE — 83540 ASSAY OF IRON: CPT

## 2024-11-26 PROCEDURE — 83010 ASSAY OF HAPTOGLOBIN QUANT: CPT

## 2024-11-26 PROCEDURE — C8925: CPT

## 2024-11-26 PROCEDURE — 84484 ASSAY OF TROPONIN QUANT: CPT

## 2024-11-26 PROCEDURE — 86850 RBC ANTIBODY SCREEN: CPT

## 2024-11-26 PROCEDURE — P9016: CPT

## 2024-11-26 PROCEDURE — 82607 VITAMIN B-12: CPT

## 2024-11-26 PROCEDURE — 95700 EEG CONT REC W/VID EEG TECH: CPT

## 2024-11-26 PROCEDURE — 95708 EEG WO VID EA 12-26HR UNMNTR: CPT

## 2024-11-26 PROCEDURE — 80053 COMPREHEN METABOLIC PANEL: CPT

## 2024-11-26 PROCEDURE — 80183 DRUG SCRN QUANT OXCARBAZEPIN: CPT

## 2024-11-26 PROCEDURE — 83036 HEMOGLOBIN GLYCOSYLATED A1C: CPT

## 2024-11-26 PROCEDURE — 83735 ASSAY OF MAGNESIUM: CPT

## 2024-11-26 PROCEDURE — 84443 ASSAY THYROID STIM HORMONE: CPT

## 2024-11-26 PROCEDURE — 80235 DRUG ASSAY LACOSAMIDE: CPT

## 2024-11-26 PROCEDURE — 82746 ASSAY OF FOLIC ACID SERUM: CPT

## 2024-11-26 PROCEDURE — 83615 LACTATE (LD) (LDH) ENZYME: CPT

## 2024-11-26 PROCEDURE — 85045 AUTOMATED RETICULOCYTE COUNT: CPT

## 2024-11-26 PROCEDURE — 84436 ASSAY OF TOTAL THYROXINE: CPT

## 2024-11-26 PROCEDURE — 86880 COOMBS TEST DIRECT: CPT

## 2024-11-26 PROCEDURE — 84466 ASSAY OF TRANSFERRIN: CPT

## 2024-11-26 PROCEDURE — 36430 TRANSFUSION BLD/BLD COMPNT: CPT

## 2024-11-26 PROCEDURE — 86900 BLOOD TYPING SEROLOGIC ABO: CPT

## 2024-11-26 PROCEDURE — 84100 ASSAY OF PHOSPHORUS: CPT

## 2024-11-26 PROCEDURE — 85730 THROMBOPLASTIN TIME PARTIAL: CPT

## 2024-11-26 PROCEDURE — 86901 BLOOD TYPING SEROLOGIC RH(D): CPT

## 2024-11-26 PROCEDURE — 36415 COLL VENOUS BLD VENIPUNCTURE: CPT

## 2024-11-26 PROCEDURE — 85027 COMPLETE CBC AUTOMATED: CPT

## 2024-11-26 PROCEDURE — 81001 URINALYSIS AUTO W/SCOPE: CPT

## 2024-11-26 PROCEDURE — 85610 PROTHROMBIN TIME: CPT

## 2024-11-26 PROCEDURE — 82728 ASSAY OF FERRITIN: CPT

## 2024-11-26 PROCEDURE — 85025 COMPLETE CBC W/AUTO DIFF WBC: CPT

## 2024-11-26 PROCEDURE — 83550 IRON BINDING TEST: CPT

## 2024-11-26 PROCEDURE — 86923 COMPATIBILITY TEST ELECTRIC: CPT

## 2024-11-26 PROCEDURE — 99285 EMERGENCY DEPT VISIT HI MDM: CPT

## 2024-11-28 NOTE — PATIENT PROFILE ADULT - FOOD INSECURITY
Anesthesia Post Evaluation    Patient: Indio Ryder Jr.    Procedure(s) Performed: Procedure(s) (LRB):  IRRIGATION AND DEBRIDEMENT (Left)  APPLICATION, EXTERNAL FIXATION SYSTEM, MULTIPLANAR, WITH IMAGING GUIDANCE (Left)    Final Anesthesia Type: general      Patient location during evaluation: PACU  Patient participation: Yes- Able to Participate  Level of consciousness: awake  Post-procedure vital signs: reviewed and stable  Pain management: adequate  Airway patency: patent    PONV status at discharge: No PONV  Anesthetic complications: no      Cardiovascular status: blood pressure returned to baseline  Respiratory status: unassisted  Hydration status: euvolemic  Follow-up not needed.              Vitals Value Taken Time   /90 11/27/24 1920   Temp 36.4 °C (97.6 °F) 11/27/24 1920   Pulse 91 11/27/24 1943   Resp 18 11/27/24 1943   SpO2 99 % 11/27/24 1943         Event Time   Out of Recovery 17:45:00         Pain/Jenny Score: Jenny Score: 10 (11/27/2024  5:45 PM)           no

## 2024-12-17 ENCOUNTER — EMERGENCY (EMERGENCY)
Facility: HOSPITAL | Age: 54
LOS: 1 days | Discharge: HOME | End: 2024-12-17
Attending: STUDENT IN AN ORGANIZED HEALTH CARE EDUCATION/TRAINING PROGRAM
Payer: COMMERCIAL

## 2024-12-17 ENCOUNTER — RX RENEWAL (OUTPATIENT)
Age: 54
End: 2024-12-17

## 2024-12-17 VITALS
HEART RATE: 72 BPM | DIASTOLIC BLOOD PRESSURE: 79 MMHG | OXYGEN SATURATION: 100 % | WEIGHT: 173.06 LBS | TEMPERATURE: 98 F | SYSTOLIC BLOOD PRESSURE: 125 MMHG | RESPIRATION RATE: 18 BRPM

## 2024-12-17 VITALS
DIASTOLIC BLOOD PRESSURE: 79 MMHG | RESPIRATION RATE: 17 BRPM | SYSTOLIC BLOOD PRESSURE: 125 MMHG | OXYGEN SATURATION: 99 % | HEART RATE: 71 BPM | TEMPERATURE: 98 F

## 2024-12-17 DIAGNOSIS — Z95.1 PRESENCE OF AORTOCORONARY BYPASS GRAFT: Chronic | ICD-10-CM

## 2024-12-17 DIAGNOSIS — Z95.828 PRESENCE OF OTHER VASCULAR IMPLANTS AND GRAFTS: Chronic | ICD-10-CM

## 2024-12-17 DIAGNOSIS — Z41.9 ENCOUNTER FOR PROCEDURE FOR PURPOSES OTHER THAN REMEDYING HEALTH STATE, UNSPECIFIED: Chronic | ICD-10-CM

## 2024-12-17 LAB
ADD ON TEST-SPECIMEN IN LAB: SIGNIFICANT CHANGE UP
ALBUMIN SERPL ELPH-MCNC: 4.2 G/DL — SIGNIFICANT CHANGE UP (ref 3.3–5)
ALP SERPL-CCNC: 86 U/L — SIGNIFICANT CHANGE UP (ref 40–120)
ALT FLD-CCNC: 50 U/L — HIGH (ref 10–45)
ANION GAP SERPL CALC-SCNC: 12 MMOL/L — SIGNIFICANT CHANGE UP (ref 5–17)
ANISOCYTOSIS BLD QL: SIGNIFICANT CHANGE UP
APTT BLD: 25.9 SEC — SIGNIFICANT CHANGE UP (ref 24.5–35.6)
AST SERPL-CCNC: 64 U/L — HIGH (ref 10–40)
BASOPHILS # BLD AUTO: 0 K/UL — SIGNIFICANT CHANGE UP (ref 0–0.2)
BASOPHILS NFR BLD AUTO: 0 % — SIGNIFICANT CHANGE UP (ref 0–2)
BILIRUB SERPL-MCNC: 0.6 MG/DL — SIGNIFICANT CHANGE UP (ref 0.2–1.2)
BLD GP AB SCN SERPL QL: NEGATIVE — SIGNIFICANT CHANGE UP
BUN SERPL-MCNC: 32 MG/DL — HIGH (ref 7–23)
CALCIUM SERPL-MCNC: 9.4 MG/DL — SIGNIFICANT CHANGE UP (ref 8.4–10.5)
CHLORIDE SERPL-SCNC: 98 MMOL/L — SIGNIFICANT CHANGE UP (ref 96–108)
CO2 SERPL-SCNC: 21 MMOL/L — LOW (ref 22–31)
CREAT SERPL-MCNC: 1.07 MG/DL — SIGNIFICANT CHANGE UP (ref 0.5–1.3)
DACRYOCYTES BLD QL SMEAR: SLIGHT — SIGNIFICANT CHANGE UP
EGFR: 82 ML/MIN/1.73M2 — SIGNIFICANT CHANGE UP
EOSINOPHIL # BLD AUTO: 0 K/UL — SIGNIFICANT CHANGE UP (ref 0–0.5)
EOSINOPHIL NFR BLD AUTO: 0 % — SIGNIFICANT CHANGE UP (ref 0–6)
GLUCOSE SERPL-MCNC: 136 MG/DL — HIGH (ref 70–99)
HCT VFR BLD CALC: 29.5 % — LOW (ref 39–50)
HGB BLD-MCNC: 9.4 G/DL — LOW (ref 13–17)
INR BLD: 0.91 RATIO — SIGNIFICANT CHANGE UP (ref 0.85–1.16)
LYMPHOCYTES # BLD AUTO: 1.07 K/UL — SIGNIFICANT CHANGE UP (ref 1–3.3)
LYMPHOCYTES # BLD AUTO: 7 % — LOW (ref 13–44)
MACROCYTES BLD QL: SLIGHT — SIGNIFICANT CHANGE UP
MANUAL SMEAR VERIFICATION: SIGNIFICANT CHANGE UP
MCHC RBC-ENTMCNC: 29.7 PG — SIGNIFICANT CHANGE UP (ref 27–34)
MCHC RBC-ENTMCNC: 31.9 G/DL — LOW (ref 32–36)
MCV RBC AUTO: 93.4 FL — SIGNIFICANT CHANGE UP (ref 80–100)
METAMYELOCYTES # FLD: 1.8 % — HIGH (ref 0–0)
MICROCYTES BLD QL: SLIGHT — SIGNIFICANT CHANGE UP
MONOCYTES # BLD AUTO: 0.26 K/UL — SIGNIFICANT CHANGE UP (ref 0–0.9)
MONOCYTES NFR BLD AUTO: 1.7 % — LOW (ref 2–14)
NEUTROPHILS # BLD AUTO: 13.72 K/UL — HIGH (ref 1.8–7.4)
NEUTROPHILS NFR BLD AUTO: 88.6 % — HIGH (ref 43–77)
NEUTS BAND # BLD: 0.9 % — SIGNIFICANT CHANGE UP (ref 0–8)
PLAT MORPH BLD: NORMAL — SIGNIFICANT CHANGE UP
PLATELET # BLD AUTO: 327 K/UL — SIGNIFICANT CHANGE UP (ref 150–400)
POIKILOCYTOSIS BLD QL AUTO: SIGNIFICANT CHANGE UP
POLYCHROMASIA BLD QL SMEAR: SLIGHT — SIGNIFICANT CHANGE UP
POTASSIUM SERPL-MCNC: 5.6 MMOL/L — HIGH (ref 3.5–5.3)
POTASSIUM SERPL-SCNC: 5.6 MMOL/L — HIGH (ref 3.5–5.3)
PROT SERPL-MCNC: 6.6 G/DL — SIGNIFICANT CHANGE UP (ref 6–8.3)
PROTHROM AB SERPL-ACNC: 10.5 SEC — SIGNIFICANT CHANGE UP (ref 9.9–13.4)
RBC # BLD: 3.16 M/UL — LOW (ref 4.2–5.8)
RBC # FLD: SIGNIFICANT CHANGE UP (ref 10.3–14.5)
RBC BLD AUTO: ABNORMAL
RH IG SCN BLD-IMP: POSITIVE — SIGNIFICANT CHANGE UP
SCHISTOCYTES BLD QL AUTO: SIGNIFICANT CHANGE UP
SODIUM SERPL-SCNC: 131 MMOL/L — LOW (ref 135–145)
WBC # BLD: 15.33 K/UL — HIGH (ref 3.8–10.5)
WBC # FLD AUTO: 15.33 K/UL — HIGH (ref 3.8–10.5)

## 2024-12-17 PROCEDURE — 85025 COMPLETE CBC W/AUTO DIFF WBC: CPT

## 2024-12-17 PROCEDURE — 85610 PROTHROMBIN TIME: CPT

## 2024-12-17 PROCEDURE — 71275 CT ANGIOGRAPHY CHEST: CPT | Mod: 26,MC

## 2024-12-17 PROCEDURE — 85730 THROMBOPLASTIN TIME PARTIAL: CPT

## 2024-12-17 PROCEDURE — 86900 BLOOD TYPING SEROLOGIC ABO: CPT

## 2024-12-17 PROCEDURE — 99285 EMERGENCY DEPT VISIT HI MDM: CPT

## 2024-12-17 PROCEDURE — 74174 CTA ABD&PLVS W/CONTRAST: CPT | Mod: MC

## 2024-12-17 PROCEDURE — 74174 CTA ABD&PLVS W/CONTRAST: CPT | Mod: 26,MC

## 2024-12-17 PROCEDURE — 71046 X-RAY EXAM CHEST 2 VIEWS: CPT | Mod: 26

## 2024-12-17 PROCEDURE — 86901 BLOOD TYPING SEROLOGIC RH(D): CPT

## 2024-12-17 PROCEDURE — 84484 ASSAY OF TROPONIN QUANT: CPT

## 2024-12-17 PROCEDURE — 71275 CT ANGIOGRAPHY CHEST: CPT | Mod: MC

## 2024-12-17 PROCEDURE — 86850 RBC ANTIBODY SCREEN: CPT

## 2024-12-17 PROCEDURE — 71046 X-RAY EXAM CHEST 2 VIEWS: CPT

## 2024-12-17 PROCEDURE — 93005 ELECTROCARDIOGRAM TRACING: CPT

## 2024-12-17 PROCEDURE — 80053 COMPREHEN METABOLIC PANEL: CPT

## 2024-12-17 PROCEDURE — 36415 COLL VENOUS BLD VENIPUNCTURE: CPT

## 2024-12-17 PROCEDURE — 99285 EMERGENCY DEPT VISIT HI MDM: CPT | Mod: 25

## 2024-12-17 RX ORDER — LIDOCAINE 40 MG/G
1 CREAM TOPICAL ONCE
Refills: 0 | Status: COMPLETED | OUTPATIENT
Start: 2024-12-17 | End: 2024-12-17

## 2024-12-17 RX ORDER — OXYCODONE HYDROCHLORIDE 30 MG/1
1 TABLET ORAL
Qty: 9 | Refills: 0
Start: 2024-12-17 | End: 2024-12-19

## 2024-12-17 RX ORDER — ESLICARBAZEPINE ACETATE 800 MG/1
1200 TABLET ORAL ONCE
Refills: 0 | Status: COMPLETED | OUTPATIENT
Start: 2024-12-17 | End: 2024-12-17

## 2024-12-17 RX ORDER — OXYCODONE HYDROCHLORIDE 30 MG/1
5 TABLET ORAL ONCE
Refills: 0 | Status: DISCONTINUED | OUTPATIENT
Start: 2024-12-17 | End: 2024-12-17

## 2024-12-17 RX ORDER — SODIUM ZIRCONIUM CYCLOSILICATE 5 G/5G
5 POWDER, FOR SUSPENSION ORAL ONCE
Refills: 0 | Status: COMPLETED | OUTPATIENT
Start: 2024-12-17 | End: 2024-12-17

## 2024-12-17 RX ORDER — LACOSAMIDE 10 MG/ML
200 INJECTION INTRAVENOUS ONCE
Refills: 0 | Status: DISCONTINUED | OUTPATIENT
Start: 2024-12-17 | End: 2024-12-17

## 2024-12-17 RX ORDER — ACETAMINOPHEN 500MG 500 MG/1
975 TABLET, COATED ORAL ONCE
Refills: 0 | Status: COMPLETED | OUTPATIENT
Start: 2024-12-17 | End: 2024-12-17

## 2024-12-17 RX ADMIN — LIDOCAINE 1 PATCH: 40 CREAM TOPICAL at 22:33

## 2024-12-17 RX ADMIN — ESLICARBAZEPINE ACETATE 1200 MILLIGRAM(S): 800 TABLET ORAL at 22:33

## 2024-12-17 RX ADMIN — LACOSAMIDE 200 MILLIGRAM(S): 10 INJECTION INTRAVENOUS at 22:33

## 2024-12-17 RX ADMIN — ACETAMINOPHEN 500MG 975 MILLIGRAM(S): 500 TABLET, COATED ORAL at 18:45

## 2024-12-17 RX ADMIN — SODIUM ZIRCONIUM CYCLOSILICATE 5 GRAM(S): 5 POWDER, FOR SUSPENSION ORAL at 21:05

## 2024-12-17 RX ADMIN — OXYCODONE HYDROCHLORIDE 5 MILLIGRAM(S): 30 TABLET ORAL at 22:32
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
## 2024-12-17 NOTE — ED PROVIDER NOTE - IV ALTEPLASE DOOR HIDDEN
Symptoms suggestive of infectious gastroenteritis, probably viral in origin but could be also due to food poisoning. Her symptoms have reslolved. Abdominal exam is unremarkable. The patient should adhere to a bland diet and avoid excessive caffeine, dairy products and roughage until symptoms improve. Push fluids and try Imodium AD as needed for diarrhea. Call if persistent fever >101, repeated vomiting, increasing abdominal pain or GI bleeding. Further evaluation, treatment, and follow-up per orders, current med list, and patient instructions.     show

## 2024-12-17 NOTE — ED PROVIDER NOTE - NSFOLLOWUPINSTRUCTIONS_ED_ALL_ED_FT
There are no signs of emergency conditions requiring admission to the hospital on today's workup.  Based on the evaluation, a presumptive diagnosis was made, however, further evaluation may be required by your primary care physician or a specialist for a more definitive diagnosis.  Therefore, please follow-up as directed or return to the Emergency Department if your symptoms change or worsen.    We recommend that you:  1. See your primary care physician within the next 72 hours for follow up.  Bring a copy of your discharge paperwork (including any test results) to your doctor.  2.  3.  4.      *** Return immediately if you have worsening symptoms, chest pain, Shortness of Breath, abdominal pain, Nausea/Vomiting/Diarrhea, dizziness, weakness, confusion, severe headache, vision changes, urinary symptoms, syncope, falls, trauma, discharge, bleeding, fevers, or any other new/concerning symptoms. *** There are no signs of emergency conditions requiring admission to the hospital on today's workup.  Based on the evaluation, a presumptive diagnosis was made, however, further evaluation may be required by your primary care physician or a specialist for a more definitive diagnosis.  Therefore, please follow-up as directed or return to the Emergency Department if your symptoms change or worsen.    We recommend that you:  1. See your primary care physician within the next 72 hours for follow up.  Bring a copy of your discharge paperwork (including any test results) to your doctor.  2. Use the incentive spirometer as instructed 10 times per hour.   3. Take 1000 mg acetaminophen every 8 hours for pain. Use lidocaine patches on the area of pain once daily. You can additionally use warm or cool compresses.  4. Follow up with your primary care doctor within 3 days.       *** Return immediately if you have worsening symptoms, chest pain, Shortness of Breath, abdominal pain, Nausea/Vomiting/Diarrhea, dizziness, weakness, confusion, severe headache, vision changes, urinary symptoms, syncope, falls, trauma, discharge, bleeding, fevers, or any other new/concerning symptoms. ***
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
## 2024-12-17 NOTE — ED ADULT TRIAGE NOTE - AS HEIGHT TYPE
5/23/2018         RE: Meme Mccurdy  [id number] [facility] [id number]        Dear Colleague,    Thank you for referring your patient, Meme Mccurdy, to the [facility]. Please see a copy of my visit note below.    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  She is here for a right acromioclavicular injection.  She is seen at the request of Dr. Lynn Turpin.    Vitals:    05/23/18 1524   BP: 126/81   Pulse: 85   SpO2: 99%       PROCEDURE    Acromioclavicular Injection - Ultrasound Guided  The patient was informed of the risks and the benefits of the procedure and a written consent was signed.  The patient s right acromioclavicular joint was prepped with chlorhexidine in sterile fashion.   20 mg of triamcinolone suspension was drawn up into a 3 mL syringe with 1 mL of 1% lidocaine.  Injection was performed using sterile technique.  Under ultrasound guidance a 5/8-inch 25-gauge needle was used to enter the right acromioclavicular joint.  Needle placement was visualized and documented with ultrasound.  Needle placed in short axis to the probe.  Ultrasound visualization was necessary due to decreased joint space in the setting of osteoarthritis.  Images were permanently stored for the patient's record.  There were no complications. The patient tolerated the procedure well. There was negligible bleeding.   The patient was instructed to ice the shoulder upon leaving clinic and refrain from overuse over the next 3 days.   The patient was instructed to call or go to the emergency room with any unusual pain, swelling, redness, or if otherwise concerned.  Kenalog: NDC [id number]    Meme is going to let us know how she is doing in 2 weeks.  If the acromioclavicular injection did not help her we could consider a glenohumeral injection on a diagnostic and therapeutic basis.      Shilo Perez, DO CAQSM      Again, thank you for allowing me to participate in the  care of your patient.        Sincerely,        Kwabena Perez, DO     stated
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
## 2024-12-17 NOTE — ED PROVIDER NOTE - PHYSICAL EXAMINATION
On Physical Exam:  General: well appearing, in NAD, speaking clearly in full sentences and without difficulty; cooperative with exam  HEENT: PERRL, MMM  Neck: no neck tenderness, no nuchal rigidity  Cardiac/Chest: murmur, R sided chest wall TTP, sternal TTP, normal s1, s2; RRR; no GR  Lungs: CTABL  Abdomen: soft nontender/nondistended  : no bladder tenderness or distension  Skin: warm, intact, no rash  Extremities: no peripheral edema, no gross deformities  Neuro: no gross neurologic deficits

## 2024-12-17 NOTE — ED PROVIDER NOTE - PATIENT PORTAL LINK FT
You can access the FollowMyHealth Patient Portal offered by [facility] by registering at the following website: [email or web address]. By joining [employer]’s FollowMyHealth portal, you will also be able to view your health information using other applications (apps) compatible with our system.

## 2025-01-13 ENCOUNTER — APPOINTMENT (OUTPATIENT)
Dept: FAMILY MEDICINE | Facility: CLINIC | Age: 55
End: 2025-01-13

## 2025-01-18 ENCOUNTER — EMERGENCY (EMERGENCY)
Facility: HOSPITAL | Age: 55
LOS: 1 days | Discharge: HOME | End: 2025-01-18
Attending: STUDENT IN AN ORGANIZED HEALTH CARE EDUCATION/TRAINING PROGRAM | Admitting: STUDENT IN AN ORGANIZED HEALTH CARE EDUCATION/TRAINING PROGRAM
Payer: COMMERCIAL

## 2025-01-18 VITALS
TEMPERATURE: 99 F | SYSTOLIC BLOOD PRESSURE: 128 MMHG | DIASTOLIC BLOOD PRESSURE: 82 MMHG | OXYGEN SATURATION: 100 % | HEART RATE: 76 BPM | RESPIRATION RATE: 15 BRPM

## 2025-01-18 VITALS
RESPIRATION RATE: 18 BRPM | HEIGHT: 72 IN | SYSTOLIC BLOOD PRESSURE: 132 MMHG | DIASTOLIC BLOOD PRESSURE: 73 MMHG | WEIGHT: 177.03 LBS | TEMPERATURE: 98 F | OXYGEN SATURATION: 98 % | HEART RATE: 77 BPM

## 2025-01-18 DIAGNOSIS — Z95.828 PRESENCE OF OTHER VASCULAR IMPLANTS AND GRAFTS: Chronic | ICD-10-CM

## 2025-01-18 DIAGNOSIS — Z41.9 ENCOUNTER FOR PROCEDURE FOR PURPOSES OTHER THAN REMEDYING HEALTH STATE, UNSPECIFIED: Chronic | ICD-10-CM

## 2025-01-18 DIAGNOSIS — Z95.1 PRESENCE OF AORTOCORONARY BYPASS GRAFT: Chronic | ICD-10-CM

## 2025-01-18 PROCEDURE — 99285 EMERGENCY DEPT VISIT HI MDM: CPT

## 2025-01-18 PROCEDURE — 93010 ELECTROCARDIOGRAM REPORT: CPT

## 2025-01-18 RX ORDER — LACOSAMIDE 100 MG/1
200 TABLET, FILM COATED ORAL ONCE
Refills: 0 | Status: DISCONTINUED | OUTPATIENT
Start: 2025-01-18 | End: 2025-01-18

## 2025-01-18 RX ORDER — ESLICARBAZEPINE ACETATE 800 MG/1
1200 TABLET ORAL ONCE
Refills: 0 | Status: COMPLETED | OUTPATIENT
Start: 2025-01-18 | End: 2025-01-18

## 2025-01-18 RX ADMIN — LACOSAMIDE 200 MILLIGRAM(S): 100 TABLET, FILM COATED ORAL at 23:38

## 2025-01-18 NOTE — ED PROVIDER NOTE - ATTENDING CONTRIBUTION TO CARE
55-year-old male, past medical history of seizures (on Vimpat and Eslicarbazepine), type a aortic dissection status post multiple vascular surgeries, CAD status post CABG, DVT/PE (not on anticoagulation), hypertension and anemia, presents to ED status post witnessed seizure activity.  Per wife, patient had sudden onset absence like seizure at 9:45 PM earlier tonight.  Seizure was resolved after intranasal Versed was given.  Patient now back to baseline.  Wife describes seizures as his typical absence seizures.  Last breakthrough seizure was back in December 2024.  Patient last saw his neurologist, Dr. Solano, back in September 2024.  Per wife patient has not been sleeping well or eating well in the last few days.  Patient denies any missed medications, besides tonight.  Denies fever/chills, headache, vision changes, chest pain/shortness of breath, abdominal pain, nausea/vomiting/diarrhea, urinary symptoms, lightheadedness/dizziness, weakness/numbness/tingling, rashes or any other symptoms at this time.    Vital signs stable.  Physical exam significant for well-appearing male in no acute distress.  Head is normocephalic is atraumatic.  Extraocular movements intact.  Pupils equal round reactive to light.  Cranial nerves II to XII intact.  Heart is regular rate and rhythm.  Lungs clear to oscillation bilaterally.  Abdomen soft and nontender/nondistended.  No lower extremity edema noted.  Pulses 2+ throughout.  Skin is warm well-perfused without rashes.  Normal strength/sensation in all extremities.  Nonfocal neuroexam.    History consistent with breakthrough seizure.  Patient states is compliant with medications.  Plan to obtain basic lab work, UA, and chest x-ray to assess for possible infectious versus metabolic etiology of breakthrough seizure.  Patient currently back to baseline with normal neuroexam.  If workup is unremarkable and patient remains asymptomatic, likely can discharge with outpatient neuro follow-up.

## 2025-01-18 NOTE — ED PROVIDER NOTE - PROGRESS NOTE DETAILS
workup unremarkable, no seizure activity, potassium slightly elevated 5.4 (was 5.6 last admission so likely runs high) no EKG changes will give katherin harp/caro with outpatient neuro follow up.

## 2025-01-18 NOTE — ED PROVIDER NOTE - NSFOLLOWUPINSTRUCTIONS_ED_ALL_ED_FT
You have been evaluated for a seizure. Your workup did not reveal any emergency conditions requiring immediate treatment. Please follow up with your neurologist.     Please return to Emergency Department immediately for any new, concerning, or worsening symptoms.     Please follow-up with as recommended.      Any results obtained today during your evaluation is attached and available in your portal. Please take all your results to follow up with your primary care doctor so that they can determine if you need any additional testing or treatment as an outpatient.     Please take prescriptions as discussed.

## 2025-01-18 NOTE — ED PROVIDER NOTE - CLINICAL SUMMARY MEDICAL DECISION MAKING FREE TEXT BOX
54 y/o male hx Type A aortic dissection s/p multiple vascular surgeries, CABG x 1, DVTs (s/p IVC now removed 9/2023), HTN, hemorrhagic pancreatitis (followed by Dr. Solano), seizure disorder, anemia presents for witnessed seizure. 56 y/o male hx Type A aortic dissection s/p multiple vascular surgeries, CABG x 1, DVTs (s/p IVC now removed 9/2023), HTN, hemorrhagic pancreatitis (followed by Dr. Solano), seizure disorder, anemia presents for witnessed seizure. He is hemodynamically stable, afebrile, on exam he is well appearing with no evidence trauma, no focal neurologic deficits, AOx3, clear lungs, soft non-tender abdomen. Likely breakthrough seizure (he has these every couple of months), will workup for toxic/metabolic/infectious source with labs, cxr, UA, flu/covid, EKG. Will give night time dose of anti-seizure meds. If workup unremarkable okay for d/c with neuro follow up.

## 2025-01-18 NOTE — ED PROVIDER NOTE - PATIENT PORTAL LINK FT
You can access the FollowMyHealth Patient Portal offered by [facility] by registering at the following website: [email or web address]. By joining [employer]’s FollowMyHealth portal, you will also be able to view your health information using other applications (apps) compatible with our system.

## 2025-01-18 NOTE — ED ADULT TRIAGE NOTE - CHIEF COMPLAINT QUOTE
Pt c/o seizure. Had witnessed seizure while in bed, approx 20 seconds. Wife administered Diazepam 7.5mg x 2 intranasally. Pt offers no complaints at this time. Reports compliance with medications. PMHx Seizures, HTN, CAD

## 2025-01-18 NOTE — ED PROVIDER NOTE - OBJECTIVE STATEMENT
56 y/o male hx Type A aortic dissection s/p multiple vascular surgeries, CABG x 1, DVTs (s/p IVC now removed 9/2023), HTN, hemorrhagic pancreatitis (followed by Dr. Solano), seizure disorder, anemia presents for witnessed seizure. 56 y/o male hx Type A aortic dissection s/p multiple vascular surgeries, CABG x 1, DVTs (s/p IVC now removed 9/2023), HTN, hemorrhagic pancreatitis (followed by Dr. Solano), seizure disorder (since 2018, on vimpat 200 + aptiom 1200), anemia presents for witnessed seizure. He had seizure at 9:45 PM while in bed, it consistent of staring straight ahead with slight drooling, which is his normal seizure. Wife gave him intranasal diazepam and seizure stopped. No tongue biting, no urinary incontinence. He had recent EEG/imaging in October that was unremarkable. No changes in baseline health this week, no fever/chills, no chest pain, no cough, no SOB, no n/v/d, no headache, no leg swelling, no recent travel, no new medications.

## 2025-01-18 NOTE — ED ADULT NURSE NOTE - OBJECTIVE STATEMENT
BARBRAA RN: Pt arrives to room 18, family at bedside. Pt is A&Ox4, ambulatory, PMHx of HTN, anemia, seizures. Pt states he had a seizure around 9:45 pm tonight. Pt wife witnessed seizure, described seizure as " a stare with drooling at the mouth". Pt wife denies any convulsions, pt denies biting his mouth and urinary incontinence. Respirations are even and unlabored, pt placed on monitor, NSR on monitor, 20G IV placed in left AC, labs drawn and sent, RVP collected and sent. Medicated as per MD Hopper ordered. Suction set up in room. Urinal placed at bedside. Bed in lowest position, comfort measures provided, warm blanket provided, safety maintained. Report given to primary RN Olimpia.

## 2025-01-19 LAB
ALBUMIN SERPL ELPH-MCNC: 3.5 G/DL — SIGNIFICANT CHANGE UP (ref 3.3–5)
ALP SERPL-CCNC: 110 U/L — SIGNIFICANT CHANGE UP (ref 40–120)
ALT FLD-CCNC: 19 U/L — SIGNIFICANT CHANGE UP (ref 4–41)
ANION GAP SERPL CALC-SCNC: 11 MMOL/L — SIGNIFICANT CHANGE UP (ref 7–14)
AST SERPL-CCNC: 46 U/L — HIGH (ref 4–40)
BASOPHILS # BLD AUTO: 0.04 K/UL — SIGNIFICANT CHANGE UP (ref 0–0.2)
BASOPHILS NFR BLD AUTO: 0.6 % — SIGNIFICANT CHANGE UP (ref 0–2)
BILIRUB SERPL-MCNC: 0.4 MG/DL — SIGNIFICANT CHANGE UP (ref 0.2–1.2)
BLOOD GAS VENOUS COMPREHENSIVE RESULT: SIGNIFICANT CHANGE UP
BUN SERPL-MCNC: 18 MG/DL — SIGNIFICANT CHANGE UP (ref 7–23)
CALCIUM SERPL-MCNC: 8.8 MG/DL — SIGNIFICANT CHANGE UP (ref 8.4–10.5)
CHLORIDE SERPL-SCNC: 103 MMOL/L — SIGNIFICANT CHANGE UP (ref 98–107)
CO2 SERPL-SCNC: 26 MMOL/L — SIGNIFICANT CHANGE UP (ref 22–31)
CREAT SERPL-MCNC: 0.85 MG/DL — SIGNIFICANT CHANGE UP (ref 0.5–1.3)
EGFR: 102 ML/MIN/1.73M2 — SIGNIFICANT CHANGE UP
EOSINOPHIL # BLD AUTO: 0.03 K/UL — SIGNIFICANT CHANGE UP (ref 0–0.5)
EOSINOPHIL NFR BLD AUTO: 0.4 % — SIGNIFICANT CHANGE UP (ref 0–6)
FLUAV AG NPH QL: SIGNIFICANT CHANGE UP
FLUBV AG NPH QL: SIGNIFICANT CHANGE UP
GLUCOSE SERPL-MCNC: 100 MG/DL — HIGH (ref 70–99)
HCT VFR BLD CALC: 28.9 % — LOW (ref 39–50)
HGB BLD-MCNC: 9.2 G/DL — LOW (ref 13–17)
IANC: 4.76 K/UL — SIGNIFICANT CHANGE UP (ref 1.8–7.4)
IMM GRANULOCYTES NFR BLD AUTO: 0.6 % — SIGNIFICANT CHANGE UP (ref 0–0.9)
LYMPHOCYTES # BLD AUTO: 1.36 K/UL — SIGNIFICANT CHANGE UP (ref 1–3.3)
LYMPHOCYTES # BLD AUTO: 19.9 % — SIGNIFICANT CHANGE UP (ref 13–44)
MCHC RBC-ENTMCNC: 30.4 PG — SIGNIFICANT CHANGE UP (ref 27–34)
MCHC RBC-ENTMCNC: 31.8 G/DL — LOW (ref 32–36)
MCV RBC AUTO: 95.4 FL — SIGNIFICANT CHANGE UP (ref 80–100)
MONOCYTES # BLD AUTO: 0.6 K/UL — SIGNIFICANT CHANGE UP (ref 0–0.9)
MONOCYTES NFR BLD AUTO: 8.8 % — SIGNIFICANT CHANGE UP (ref 2–14)
NEUTROPHILS # BLD AUTO: 4.76 K/UL — SIGNIFICANT CHANGE UP (ref 1.8–7.4)
NEUTROPHILS NFR BLD AUTO: 69.7 % — SIGNIFICANT CHANGE UP (ref 43–77)
NRBC # BLD: 0 /100 WBCS — SIGNIFICANT CHANGE UP (ref 0–0)
NRBC # FLD: 0 K/UL — SIGNIFICANT CHANGE UP (ref 0–0)
PLATELET # BLD AUTO: 376 K/UL — SIGNIFICANT CHANGE UP (ref 150–400)
POTASSIUM SERPL-MCNC: 5.4 MMOL/L — HIGH (ref 3.5–5.3)
POTASSIUM SERPL-SCNC: 5.4 MMOL/L — HIGH (ref 3.5–5.3)
PROT SERPL-MCNC: 6.4 G/DL — SIGNIFICANT CHANGE UP (ref 6–8.3)
RBC # BLD: 3.03 M/UL — LOW (ref 4.2–5.8)
RBC # FLD: 22.5 % — HIGH (ref 10.3–14.5)
RSV RNA NPH QL NAA+NON-PROBE: SIGNIFICANT CHANGE UP
SARS-COV-2 RNA SPEC QL NAA+PROBE: SIGNIFICANT CHANGE UP
SODIUM SERPL-SCNC: 140 MMOL/L — SIGNIFICANT CHANGE UP (ref 135–145)
WBC # BLD: 6.83 K/UL — SIGNIFICANT CHANGE UP (ref 3.8–10.5)
WBC # FLD AUTO: 6.83 K/UL — SIGNIFICANT CHANGE UP (ref 3.8–10.5)

## 2025-01-19 PROCEDURE — 71046 X-RAY EXAM CHEST 2 VIEWS: CPT | Mod: 26

## 2025-01-19 RX ORDER — SODIUM CHLORIDE 9 MG/ML
1000 INJECTION, SOLUTION INTRAMUSCULAR; INTRAVENOUS; SUBCUTANEOUS ONCE
Refills: 0 | Status: DISCONTINUED | OUTPATIENT
Start: 2025-01-19 | End: 2025-01-19

## 2025-01-19 RX ORDER — SODIUM ZIRCONIUM CYCLOSILICATE 10 G/10G
10 POWDER, FOR SUSPENSION ORAL ONCE
Refills: 0 | Status: COMPLETED | OUTPATIENT
Start: 2025-01-19 | End: 2025-01-19

## 2025-01-19 RX ADMIN — ESLICARBAZEPINE ACETATE 1200 MILLIGRAM(S): 800 TABLET ORAL at 00:05

## 2025-01-19 RX ADMIN — SODIUM ZIRCONIUM CYCLOSILICATE 10 GRAM(S): 10 POWDER, FOR SUSPENSION ORAL at 02:07

## 2025-02-01 NOTE — ASU PATIENT PROFILE, ADULT - NSICDXPASTSURGICALHX_GEN_ALL_CORE_FT
PAST SURGICAL HISTORY:  S/P aortic bifurcation bypass graft     S/P CABG x 1      None/Vascular stents/Clips PAST SURGICAL HISTORY:  Elective surgery IVC filter    S/P aortic bifurcation bypass graft     S/P CABG x 1

## 2025-02-06 ENCOUNTER — INPATIENT (INPATIENT)
Facility: HOSPITAL | Age: 55
LOS: 3 days | Discharge: HOME | DRG: 101 | End: 2025-02-10
Attending: PSYCHIATRY & NEUROLOGY | Admitting: PSYCHIATRY & NEUROLOGY
Payer: MEDICARE

## 2025-02-06 VITALS
OXYGEN SATURATION: 97 % | WEIGHT: 169.98 LBS | SYSTOLIC BLOOD PRESSURE: 117 MMHG | TEMPERATURE: 98 F | HEART RATE: 78 BPM | RESPIRATION RATE: 18 BRPM | DIASTOLIC BLOOD PRESSURE: 73 MMHG | HEIGHT: 72 IN

## 2025-02-06 DIAGNOSIS — Z95.1 PRESENCE OF AORTOCORONARY BYPASS GRAFT: Chronic | ICD-10-CM

## 2025-02-06 DIAGNOSIS — Z41.9 ENCOUNTER FOR PROCEDURE FOR PURPOSES OTHER THAN REMEDYING HEALTH STATE, UNSPECIFIED: Chronic | ICD-10-CM

## 2025-02-06 DIAGNOSIS — R56.9 UNSPECIFIED CONVULSIONS: ICD-10-CM

## 2025-02-06 DIAGNOSIS — Z95.828 PRESENCE OF OTHER VASCULAR IMPLANTS AND GRAFTS: Chronic | ICD-10-CM

## 2025-02-06 LAB
ALBUMIN SERPL ELPH-MCNC: 4.1 G/DL — SIGNIFICANT CHANGE UP (ref 3.3–5)
ALP SERPL-CCNC: 96 U/L — SIGNIFICANT CHANGE UP (ref 40–120)
ALT FLD-CCNC: 33 U/L — SIGNIFICANT CHANGE UP (ref 10–45)
ANION GAP SERPL CALC-SCNC: 9 MMOL/L — SIGNIFICANT CHANGE UP (ref 5–17)
ANISOCYTOSIS BLD QL: SIGNIFICANT CHANGE UP
APTT BLD: 31.6 SEC — SIGNIFICANT CHANGE UP (ref 24.5–35.6)
AST SERPL-CCNC: 72 U/L — HIGH (ref 10–40)
BASOPHILS # BLD AUTO: 0 K/UL — SIGNIFICANT CHANGE UP (ref 0–0.2)
BASOPHILS NFR BLD AUTO: 0 % — SIGNIFICANT CHANGE UP (ref 0–2)
BILIRUB SERPL-MCNC: 0.7 MG/DL — SIGNIFICANT CHANGE UP (ref 0.2–1.2)
BLD GP AB SCN SERPL QL: NEGATIVE — SIGNIFICANT CHANGE UP
BUN SERPL-MCNC: 20 MG/DL — SIGNIFICANT CHANGE UP (ref 7–23)
CALCIUM SERPL-MCNC: 9.2 MG/DL — SIGNIFICANT CHANGE UP (ref 8.4–10.5)
CHLORIDE SERPL-SCNC: 98 MMOL/L — SIGNIFICANT CHANGE UP (ref 96–108)
CO2 SERPL-SCNC: 30 MMOL/L — SIGNIFICANT CHANGE UP (ref 22–31)
CREAT SERPL-MCNC: 0.87 MG/DL — SIGNIFICANT CHANGE UP (ref 0.5–1.3)
EGFR: 101 ML/MIN/1.73M2 — SIGNIFICANT CHANGE UP
EOSINOPHIL # BLD AUTO: 0 K/UL — SIGNIFICANT CHANGE UP (ref 0–0.5)
EOSINOPHIL NFR BLD AUTO: 0 % — SIGNIFICANT CHANGE UP (ref 0–6)
GAS PNL BLDV: SIGNIFICANT CHANGE UP
GLUCOSE SERPL-MCNC: 116 MG/DL — HIGH (ref 70–99)
HCT VFR BLD CALC: 29.2 % — LOW (ref 39–50)
HGB BLD-MCNC: 9 G/DL — LOW (ref 13–17)
INR BLD: 0.96 RATIO — SIGNIFICANT CHANGE UP (ref 0.85–1.16)
LYMPHOCYTES # BLD AUTO: 1.34 K/UL — SIGNIFICANT CHANGE UP (ref 1–3.3)
LYMPHOCYTES # BLD AUTO: 13.3 % — SIGNIFICANT CHANGE UP (ref 13–44)
MACROCYTES BLD QL: SLIGHT — SIGNIFICANT CHANGE UP
MAGNESIUM SERPL-MCNC: 2.3 MG/DL — SIGNIFICANT CHANGE UP (ref 1.6–2.6)
MANUAL SMEAR VERIFICATION: SIGNIFICANT CHANGE UP
MCHC RBC-ENTMCNC: 30.3 PG — SIGNIFICANT CHANGE UP (ref 27–34)
MCHC RBC-ENTMCNC: 30.8 G/DL — LOW (ref 32–36)
MCV RBC AUTO: 98.3 FL — SIGNIFICANT CHANGE UP (ref 80–100)
MICROCYTES BLD QL: SLIGHT — SIGNIFICANT CHANGE UP
MONOCYTES # BLD AUTO: 0.44 K/UL — SIGNIFICANT CHANGE UP (ref 0–0.9)
MONOCYTES NFR BLD AUTO: 4.4 % — SIGNIFICANT CHANGE UP (ref 2–14)
MYELOCYTES NFR BLD: 0.9 % — HIGH (ref 0–0)
NEUTROPHILS # BLD AUTO: 8.17 K/UL — HIGH (ref 1.8–7.4)
NEUTROPHILS NFR BLD AUTO: 81.4 % — HIGH (ref 43–77)
OVALOCYTES BLD QL SMEAR: SLIGHT — SIGNIFICANT CHANGE UP
PHOSPHATE SERPL-MCNC: 4 MG/DL — SIGNIFICANT CHANGE UP (ref 2.5–4.5)
PLAT MORPH BLD: NORMAL — SIGNIFICANT CHANGE UP
PLATELET # BLD AUTO: 290 K/UL — SIGNIFICANT CHANGE UP (ref 150–400)
POIKILOCYTOSIS BLD QL AUTO: SIGNIFICANT CHANGE UP
POLYCHROMASIA BLD QL SMEAR: SLIGHT — SIGNIFICANT CHANGE UP
POTASSIUM SERPL-MCNC: 4.7 MMOL/L — SIGNIFICANT CHANGE UP (ref 3.5–5.3)
POTASSIUM SERPL-SCNC: 4.7 MMOL/L — SIGNIFICANT CHANGE UP (ref 3.5–5.3)
PROT SERPL-MCNC: 6.6 G/DL — SIGNIFICANT CHANGE UP (ref 6–8.3)
PROTHROM AB SERPL-ACNC: 11 SEC — SIGNIFICANT CHANGE UP (ref 9.9–13.4)
RBC # BLD: 2.97 M/UL — LOW (ref 4.2–5.8)
RBC # FLD: 25.7 % — HIGH (ref 10.3–14.5)
RBC BLD AUTO: ABNORMAL
RH IG SCN BLD-IMP: POSITIVE — SIGNIFICANT CHANGE UP
SCHISTOCYTES BLD QL AUTO: SIGNIFICANT CHANGE UP
SODIUM SERPL-SCNC: 137 MMOL/L — SIGNIFICANT CHANGE UP (ref 135–145)
TARGETS BLD QL SMEAR: SLIGHT — SIGNIFICANT CHANGE UP
TROPONIN T, HIGH SENSITIVITY RESULT: 22 NG/L — SIGNIFICANT CHANGE UP (ref 0–51)
VALPROATE SERPL-MCNC: <5 UG/ML — LOW (ref 50–100)
WBC # BLD: 10.04 K/UL — SIGNIFICANT CHANGE UP (ref 3.8–10.5)
WBC # FLD AUTO: 10.04 K/UL — SIGNIFICANT CHANGE UP (ref 3.8–10.5)

## 2025-02-06 PROCEDURE — 99223 1ST HOSP IP/OBS HIGH 75: CPT

## 2025-02-06 PROCEDURE — 99291 CRITICAL CARE FIRST HOUR: CPT

## 2025-02-06 PROCEDURE — 70450 CT HEAD/BRAIN W/O DYE: CPT | Mod: 26

## 2025-02-06 RX ORDER — SODIUM ZIRCONIUM CYCLOSILICATE 5 G/5G
5 POWDER, FOR SUSPENSION ORAL ONCE
Refills: 0 | Status: COMPLETED | OUTPATIENT
Start: 2025-02-06 | End: 2025-02-06

## 2025-02-06 RX ORDER — ESLICARBAZEPINE ACETATE 800 MG/1
1200 TABLET ORAL DAILY
Refills: 0 | Status: DISCONTINUED | OUTPATIENT
Start: 2025-02-06 | End: 2025-02-07

## 2025-02-06 RX ORDER — LACOSAMIDE 200 MG/1
200 TABLET, FILM COATED ORAL
Refills: 0 | Status: DISCONTINUED | OUTPATIENT
Start: 2025-02-06 | End: 2025-02-07

## 2025-02-06 RX ADMIN — LACOSAMIDE 200 MILLIGRAM(S): 200 TABLET, FILM COATED ORAL at 22:32

## 2025-02-06 RX ADMIN — Medication 2 MILLIGRAM(S): at 19:43

## 2025-02-06 RX ADMIN — SODIUM ZIRCONIUM CYCLOSILICATE 5 GRAM(S): 5 POWDER, FOR SUSPENSION ORAL at 22:22

## 2025-02-06 RX ADMIN — ESLICARBAZEPINE ACETATE 1200 MILLIGRAM(S): 800 TABLET ORAL at 22:49

## 2025-02-06 NOTE — ED ADULT NURSE NOTE - OBJECTIVE STATEMENT
Break coverage RN: 54 y/o M A&Ox4 with PMH of Seizures, CABG, HTN, pancreatitis and aortic dissection presents to the ED c/o seizures. Pt's wife at bedside reports pt had 3 seizures today (2 witnessed by the wife); reports seizures present as episodes of "staring spells and drooling;" reports seizures lasted "a few seconds to about one min or two mins." Pt reports episodes are usually preceded by "a funny feeling and nausea." Pt endorses some generalized fatigue at this time. Pt endorses compliance with medication; reports taking Vimpat and Aptiom. Pt reports last seizure was 2 weeks ago. Denies chest pain, SOB, v/d, lightheadedness, dizziness, fever, chills at this time. Denies sick contacts. IV access established; 20 G R AC. Patient placed on cardiac monitor. Patient safety maintained, bed is in lowest position, wheels locked, and side rails raised.

## 2025-02-06 NOTE — ED ADULT TRIAGE NOTE - CHIEF COMPLAINT QUOTE
seizure today witnessed by wife, no trauma or fall  h/x seizures  given 0.5mg clonazepam seizure today witnessed by wife, no trauma or fall  h/x seizures  given 0.5mg clonazepam by wife

## 2025-02-06 NOTE — ED PROVIDER NOTE - CPE EDP ENMT NORM
Neurosurgery MD to come assess patient at bedside.
awaiting order from Dr. Hernan Dougherty from neurosurgey.
normal...

## 2025-02-06 NOTE — ED PROVIDER NOTE - DIFFERENTIAL DIAGNOSIS
Differential Diagnosis Ddx includes, however, is not limited to: breakthrough seizure, med nonadherence, metabolic d/o, ICH, other

## 2025-02-06 NOTE — ED ADULT NURSE REASSESSMENT NOTE - NS ED NURSE REASSESS COMMENT FT1
Pt is lethargic and difficult time following commands. PT urinated himself. Pt cleaned, turned & repositioned for safety & comfort. Warm blanket provided. Pt failed dysphagia (see documentation in chart), MD made aware. per MD po medications can be held off for now. Pt is lethargic and having a difficult time following commands. PT urinated himself. Pt cleaned, turned & repositioned for safety & comfort. Warm blanket provided. Pt failed dysphagia (see documentation in chart), MD made aware. per MD po medications can be held off for now.

## 2025-02-06 NOTE — H&P ADULT - NSHPLABSRESULTS_GEN_ALL_CORE
LABS:                      9.0    10.04 )-----------( 290      ( 06 Feb 2025 20:19 )             29.2     02-06    137  |  98  |  20  ----------------------------<  116[H]  4.7   |  30  |  0.87    Ca    9.2      06 Feb 2025 20:19  Phos  4.0     02-06  Mg     2.3     02-06    TPro  6.6  /  Alb  4.1  /  TBili  0.7  /  DBili  x   /  AST  72[H]  /  ALT  33  /  AlkPhos  96  02-06    PT/INR - ( 06 Feb 2025 20:19 )   PT: 11.0 sec;   INR: 0.96 ratio         PTT - ( 06 Feb 2025 20:19 )  PTT:31.6 sec

## 2025-02-06 NOTE — H&P ADULT - ASSESSMENT
Assessment: 55M PMHx HTN, HLD, CAD s/p CABG. DVTs, aortic dissection s/p bypass graft, hemorrhagic pancreatitis, epilepsy who presented due to 4 seizure episodes today. Episodes consistent of staring spells, drooling, abnormal eye movements, lasting 2 minutes and resolving spontaneously. Took clonazepam prior to coming to the hospital. Had an additional episode in the ED witnessed by staff, received Ativan 2 mg. Patient endorses medication compliance, however wife is not sure if he remembers to take the medications every day. Follows with NP Delilah Patel at [facility]. Last EEG 10/2024 with occasional R temporal spikes. In ED, vital signs were stable. Labs with chronic anemia, non elevated lactate, otherwise unremarkable. Exam limited due to patient unwilling to participate and drowsy after Ativan. No evidence of focal deficits noted.    Impression: Focal epilepsy with impaired awareness s/p 4 episodes today, breakthrough seizures vs medication noncompliance. EMU admission for event capture and medication adjustment.    Plan:  [] Vimpat, carbamazepine levels  [] CBC, CMP, magnesium, phosphorous, CK   [] UA, Utox   [] vEEG  [] MRI Brain-Seizure, Epilepsy protocol  [] Continue home ASMs for now: Vimpat 200 mg BID, Aptiom 1200 mg qHS  [] Rescue medications: 1st line- Lorazepam 2 mg IV PRN for seizure activity lasting >3-5mins, >2x/hr, >3x/day, or if GTC , repeat after 1 minute (max dose 0.1 mg/kg). 2nd line- Vimpat 200 mg IV for seizures lasting >3 minutes refractory to lorazepam  [] Telemetry  [] Vital signs and neurological checks q4h  [] Seizure, fall and aspiration precautions.    Discussed with epilepsy fellow Dr. Lorenzo under supervision of attending Dr. Isaac. Case and plan not complete until attending attestation. Assessment: 55M PMHx HTN, HLD, CAD s/p CABG. DVTs, aortic dissection s/p bypass graft, hemorrhagic pancreatitis, epilepsy who presented due to 4 seizure episodes today. Episodes consistent of staring spells, drooling, abnormal eye movements, lasting 2 minutes and resolving spontaneously. Took clonazepam prior to coming to the hospital. Had an additional episode in the ED witnessed by staff, received Ativan 2 mg. Patient endorses medication compliance, however wife is not sure if he remembers to take the medications every day. Follows with NP Delilah Patel at [facility]. Last EEG 10/2024 with occasional R temporal spikes. In ED, vital signs were stable. Labs with chronic anemia, non elevated lactate, otherwise unremarkable. Exam limited due to patient unwilling to participate and drowsy after Ativan. No evidence of focal deficits noted.    Impression: Focal epilepsy with impaired awareness s/p 4 episodes today, breakthrough seizures vs medication noncompliance. EMU admission for event capture and medication adjustment.    Plan:  [] Vimpat, carbamazepine levels  [] CBC, CMP, magnesium, phosphorous, CK   [] UA, Utox   [] vEEG  [] Continue home ASMs for now: Vimpat 200 mg BID, Aptiom 1200 mg qHS  [] Rescue medications: 1st line- Lorazepam 2 mg IV PRN for seizure activity lasting >3-5mins, >2x/hr, >3x/day, or if GTC , repeat after 1 minute (max dose 0.1 mg/kg). 2nd line- Vimpat 200 mg IV for seizures lasting >3 minutes refractory to lorazepam  [] Telemetry  [] Vital signs and neurological checks q4h  [] Seizure, fall and aspiration precautions.    Discussed with epilepsy fellow Dr. Lorenzo under supervision of attending Dr. Isaac. Case and plan not complete until attending attestation.

## 2025-02-06 NOTE — ED PROVIDER NOTE - OBJECTIVE STATEMENT
54 y/o male hx Type A aortic dissection s/p multiple vascular surgeries, CABG x 1, DVTs (s/p IVC now removed 9/2023), HTN, hemorrhagic pancreatitis (followed by Dr. Solano), seizure disorder (since 2018, on vimpat 200 + aptiom 1200), anemia presents for Increasing seizure activity and frequency at home.  Patient's wife states that his typical seizure activity in the recent past has been staring spells loss of consciousness and eye twitching and he no longer has generalized tonic-clonic shaking as he used to remotely.  Currently takes Vimpat and Aptiom for his seizure prophylaxis.  Wife states that throughout the day today he had 3 seizures, 2 of which she witnessed and seem to last a few seconds to the longest being a minute or 2.  Patient himself states that they are usually preceded by a sewed of nausea.  Otherwise reporting some generalized weakness and fatigue but no other reported symptoms.  Denies recent fevers, chills, chest pain, shortness of breath, abdominal pain, vomiting, melena or hematochezia

## 2025-02-06 NOTE — ED PROVIDER NOTE - CLINICAL SUMMARY MEDICAL DECISION MAKING FREE TEXT BOX
Tanner Mendosa MD (PGY-4):  55-year-old male with above past medical history including known seizure disorder on Vimpat and Aptiom.  She follows with outpatient neurology presents for increasing frequency of seizure activities which include staring spells, drooling, eye twitching, unresponsiveness.  Last for few seconds to a minute.  3 reported seizures today alone.  Patient is reportedly compliant with his medications.  No recent fevers, chills, infectious symptoms.  Here in ED patient had 1 witnessed seizure lasting 1 to 2 minutes with staring, unresponsiveness and eye twitching.  Concern for breakthrough seizures in the setting of AED regimen inadequacy versus less likely decreased compliance as patient and wife endorsing compliance to current regimen versus less likely underlying infection as no recent fevers or localizing symptoms versus less likely intracranial bleeding or mass as relatively recent imaging and no focal neurologic deficit on exam.  Other etiologies excluded.  Will obtain CBC, CMP, CT head Noncon VPA level, VBG touch base with neurology and reassess. Tanner Mendosa MD (PGY-4):  55-year-old male with above past medical history including known seizure disorder on Vimpat and Aptiom.  She follows with outpatient neurology presents for increasing frequency of seizure activities which include staring spells, drooling, eye twitching, unresponsiveness.  Last for few seconds to a minute.  3 reported seizures today alone.  Patient is reportedly compliant with his medications.  No recent fevers, chills, infectious symptoms.  Here in ED patient had 1 witnessed seizure lasting 1 to 2 minutes with staring, unresponsiveness and eye twitching.  Concern for breakthrough seizures in the setting of AED regimen inadequacy versus less likely decreased compliance as patient and wife endorsing compliance to current regimen versus less likely underlying infection as no recent fevers or localizing symptoms versus less likely intracranial bleeding or mass as relatively recent imaging and no focal neurologic deficit on exam.  Other etiologies excluded.  Will obtain CBC, CMP, CT head Noncon VPA level, VBG touch base with neurology and reassess.    RUTHIE Garza MD: Agree with resident/ACP MDM, assessment and plan as above. Tanner Mendosa MD (PGY-4):  55-year-old male with above past medical history including known seizure disorder on Vimpat and Aptiom.  She follows with outpatient neurology presents for increasing frequency of seizure activities which include staring spells, drooling, eye twitching, unresponsiveness.  Last for few seconds to a minute.  3 reported seizures today alone.  Patient is reportedly compliant with his medications.  No recent fevers, chills, infectious symptoms.  Here in ED patient had 1 witnessed seizure lasting 1 to 2 minutes with staring, unresponsiveness and eye twitching.  Concern for breakthrough seizures in the setting of AED regimen inadequacy versus less likely decreased compliance as patient and wife endorsing compliance to current regimen versus less likely underlying infection as no recent fevers or localizing symptoms versus less likely intracranial bleeding or mass as relatively recent imaging and no focal neurologic deficit on exam.  Other etiologies excluded.  Will obtain CBC, CMP, CT head Noncon VPA level, VBG touch base with neurology and reassess.    RUTHIE Garza MD: Agree with resident/ACP MDM, assessment and plan as above. Pt had witnessed seizure in ED, where eyes diverted to R, then to the L, pt was unresponsive and drooling, lasted ~2 mins. 2mg IV ativan given. Will continue to monitor.

## 2025-02-06 NOTE — ED ADULT NURSE REASSESSMENT NOTE - NS ED NURSE REASSESS COMMENT FT1
RN made aware pt desatting to 80% on RA. Attending MD Garza and KUN Epstein at bedside; patient actively having a seizure; pt staring to R side. Patient placed on 6 L nasal cannula; O2 sat improved to 100%. Patient given 2 mg of Ativan IVP administered by KUN Epstein as per verbal order from Attending MD Garza. Patient returned to baseline mental status. Wife remains with patient at bedside. Patient safety maintained, bed is in lowest position, wheels locked, and side rails raised. RN made aware pt desatting to 80% on RA. Attending MD Garza and KUN Epstein at bedside; patient actively having a seizure; pt observed to be staring to R side. Patient placed on 6 L nasal cannula; O2 sat improved to 100%. Patient given 2 mg of Ativan IVP administered by KUN Epstein as per verbal order from Attending MD Garza. Patient returned to baseline mental status. Wife remains with patient at bedside. Patient safety maintained, bed is in lowest position, wheels locked, and side rails raised. RN made aware pt desatting to 80% on RA. Attending MD Garza and KUN Epstein at bedside; patient actively having a seizure; pt observed to be staring to R side; seizure lasted approximately 2 mins. Patient placed on 6 L nasal cannula; O2 sat improved to 100%. Patient given 2 mg of Ativan IVP administered by KUN Epstein as per verbal order from Attending MD Garza. Patient returned to baseline mental status. Wife remains with patient at bedside. Patient safety maintained, bed is in lowest position, wheels locked, and side rails raised. RN made aware pt desatting to 80% on RA. Attending MD Garza and KUN Epstein at bedside; patient actively having a seizure; pt observed to be staring to R side; seizure lasted approximately 2 mins. Patient placed on 6 L nasal cannula; O2 sat improved to 100%. Patient given 2 mg of Ativan IVP administered by KUN Epstein as per verbal order from Attending MD Garza. Additional IV access established; 20 G L wrist. Patient returned to baseline mental status. Wife remains with patient at bedside. Patient safety maintained, bed is in lowest position, wheels locked, and side rails raised.

## 2025-02-06 NOTE — H&P ADULT - HISTORY OF PRESENT ILLNESS
Ovidio Villalobos is a 55-year-old male with PMHx HTN, aortic dissection (s/p aortic bifurcation bypass graft), CAD (s/p CABG), DVT/PE, pancreatitis, seizure disorder (on Vimpat and Aptiom, follows with CRISTIAN Patel) who presented to the ED due to multiple seizure episodes at home. Per wife at the bedside, patient experienced 3 episodes of unresponsiveness/staring with drooling, in one case associated with urinary incontinence. Episodes were not consecutive, each one lasted for about 2 minutes prior to resolving spontaneously. After patient's third episode of the day, his wife gave him clonazepam, however once he arrived to the ED patient had another similar episode witnessed by ED staff for which patient received Ativan 2 mg. Per wife, patient had 2 additional episodes back on 2/4, prior to that he had one episode in December. Semiology of today's events is consistent with his usual seizure episodes. Patient reports taking his medications today, however per wife patient has a poor memory and she sure if he always remembers to take the medications. Denies recent infection or other triggering event.     Patient was first diagnosed with epilepsy in 2018 when he experienced several GTCs. He was initially placed on Keppra and remained seizure free through 2019, however experienced recurrence in 2020 and was switched to depakote. Vimpat was later added due to poor seizure control.  Ovidio Villalobos is a 55-year-old male with PMHx HTN, aortic dissection (s/p aortic bifurcation bypass graft), CAD (s/p CABG), DVTs (s/p IVC filter removed 9/2023), hemorrhagic pancreatitis, seizure disorder (on Vimpat and Aptiom, follows with NP Delilah Patel) who presented to the ED due to multiple seizure episodes at home. Per wife at the bedside, patient experienced 3 episodes of unresponsiveness/staring with drooling, in one case associated with urinary incontinence. Episodes were not consecutive, each one lasted for about 2 minutes prior to resolving spontaneously. After patient's third episode of the day, his wife gave him clonazepam, however once he arrived to the ED patient had another similar episode witnessed by ED staff for which patient received Ativan 2 mg. Per wife, patient had 2 additional episodes back on 2/4, prior to that he had one episode in December. Semiology of today's events is consistent with his usual seizure episodes. Patient reports taking his medications today, however per wife patient has a poor memory and she sure if he always remembers to take the medications. Denies recent infection or other triggering event.     Patient was first diagnosed with epilepsy in 2018 when he experienced several GTCs. He was initially placed on Keppra and remained seizure free through 2019, however experienced recurrence in 2020 and was switched to depakote. Vimpat was later added due to poor seizure control. Patient had episode of hemorrhagic pancreatitis in 2023 and was switched off depakote and started on Aptiom. Current regimen is Vimpat 200 mg BID, Aptiom 1200 mg qHS. Patient last underwent inpatient EEG monitoring in 10/2024, which showed occasional R temporal discharges.    Semiology: Initially GTCs, however since 2020 has not had any of these episodes. Current episodes are described as staring spells with aura of stomach rising.

## 2025-02-06 NOTE — ED ADULT NURSE REASSESSMENT NOTE - NS ED NURSE REASSESS COMMENT FT1
Report received from KUN Davalos. Pt alert & oriented x 3, states to be tired. Breathing spontaneous and nonlabored on 4L NC, cardiac mointor NSR. IV site patent, flushing without difficulty. Pt made aware of plan of care.

## 2025-02-06 NOTE — ED PROVIDER NOTE - ATTENDING WITH...
I have entered a referral for you to see a orthopedic hand specialist.  They will be calling you.  
Resident

## 2025-02-06 NOTE — ED ADULT NURSE REASSESSMENT NOTE - NS ED NURSE REASSESS COMMENT FT1
Report received from Yamila TRISTAN. Pt remains with 1:1 at bedside; in no acute distress at time. Wife remains at bedside. Patient safety maintained, bed is in lowest position, wheels locked, and side rails raised.

## 2025-02-06 NOTE — ED ADULT NURSE REASSESSMENT NOTE - NS ED NURSE REASSESS COMMENT FT1
Pt is more alert and cooperative. PT trialed for dysphagia screening. PT passed 5ml 10ml 20ml without gargling, wet voice or difficulty swallowing. MD made aware and can take oral medications Pt is more alert and cooperative. PT trialed for dysphagia screening. PT passed 5ml 20ml 90ml without gargling, wet voice or difficulty swallowing. MD made aware and can take oral medications

## 2025-02-06 NOTE — H&P ADULT - NSHPPHYSICALEXAM_GEN_ALL_CORE
*Limited exam due to patient participation and drowsiness s/p Ativan  GEN: Lethargic, no acute distress  HEENT: No tongue lac  NEURO:     MENTAL STATUS: Arouses to voice, knows name, knows he is in the hospital, knows the year but not the month. Initially says the president is "Ofelia" but later said "Marylu". Speech is fluent. Follows commands.  CRANIAL NERVES: Pupils equal and reactive, EOM intact, no gaze preference or deviation, no facial asymmetry, normal hearing to speech  MOTOR: Moves all extremities symmetrically  SENSORY: Light touch/pinprick intact in all extremities  REFLEXES: Patient refused exam      COORD: Patient refused exam  Gait: Unable to assess

## 2025-02-07 LAB — ADD ON TEST-SPECIMEN IN LAB: SIGNIFICANT CHANGE UP

## 2025-02-07 PROCEDURE — 99232 SBSQ HOSP IP/OBS MODERATE 35: CPT

## 2025-02-07 PROCEDURE — 95720 EEG PHY/QHP EA INCR W/VEEG: CPT

## 2025-02-07 RX ORDER — AMLODIPINE BESYLATE 5 MG
10 TABLET ORAL DAILY
Refills: 0 | Status: DISCONTINUED | OUTPATIENT
Start: 2025-02-07 | End: 2025-02-10

## 2025-02-07 RX ORDER — ENOXAPARIN SODIUM 100 MG/ML
40 INJECTION SUBCUTANEOUS EVERY 24 HOURS
Refills: 0 | Status: DISCONTINUED | OUTPATIENT
Start: 2025-02-07 | End: 2025-02-10

## 2025-02-07 RX ORDER — LACOSAMIDE 200 MG/1
200 TABLET, FILM COATED ORAL ONCE
Refills: 0 | Status: DISCONTINUED | OUTPATIENT
Start: 2025-02-07 | End: 2025-02-10

## 2025-02-07 RX ORDER — PANTOPRAZOLE 20 MG/1
40 TABLET, DELAYED RELEASE ORAL
Refills: 0 | Status: DISCONTINUED | OUTPATIENT
Start: 2025-02-07 | End: 2025-02-10

## 2025-02-07 RX ORDER — BRIVARACETAM 50 MG/1
100 TABLET, FILM COATED ORAL
Refills: 0 | Status: DISCONTINUED | OUTPATIENT
Start: 2025-02-07 | End: 2025-02-08

## 2025-02-07 RX ORDER — LACOSAMIDE 200 MG/1
150 TABLET, FILM COATED ORAL
Refills: 0 | Status: DISCONTINUED | OUTPATIENT
Start: 2025-02-07 | End: 2025-02-10

## 2025-02-07 RX ORDER — ASPIRIN 81 MG/1
81 TABLET, COATED ORAL DAILY
Refills: 0 | Status: DISCONTINUED | OUTPATIENT
Start: 2025-02-07 | End: 2025-02-10

## 2025-02-07 RX ORDER — ACETAMINOPHEN 160 MG/5ML
650 SUSPENSION ORAL EVERY 6 HOURS
Refills: 0 | Status: DISCONTINUED | OUTPATIENT
Start: 2025-02-07 | End: 2025-02-10

## 2025-02-07 RX ORDER — LACOSAMIDE 200 MG/1
200 TABLET, FILM COATED ORAL ONCE
Refills: 0 | Status: DISCONTINUED | OUTPATIENT
Start: 2025-02-07 | End: 2025-02-07

## 2025-02-07 RX ORDER — AMLODIPINE BESYLATE 5 MG
1 TABLET ORAL
Refills: 0 | DISCHARGE

## 2025-02-07 RX ORDER — HYDROCHLOROTHIAZIDE 50 MG
1 TABLET ORAL
Refills: 0 | DISCHARGE

## 2025-02-07 RX ORDER — ATORVASTATIN CALCIUM 80 MG/1
20 TABLET, FILM COATED ORAL AT BEDTIME
Refills: 0 | Status: DISCONTINUED | OUTPATIENT
Start: 2025-02-07 | End: 2025-02-10

## 2025-02-07 RX ADMIN — ACETAMINOPHEN 650 MILLIGRAM(S): 160 SUSPENSION ORAL at 12:09

## 2025-02-07 RX ADMIN — ACETAMINOPHEN 650 MILLIGRAM(S): 160 SUSPENSION ORAL at 21:22

## 2025-02-07 RX ADMIN — LACOSAMIDE 130 MILLIGRAM(S): 200 TABLET, FILM COATED ORAL at 20:07

## 2025-02-07 RX ADMIN — ACETAMINOPHEN 650 MILLIGRAM(S): 160 SUSPENSION ORAL at 12:40

## 2025-02-07 RX ADMIN — BRIVARACETAM 100 MILLIGRAM(S): 50 TABLET, FILM COATED ORAL at 17:06

## 2025-02-07 RX ADMIN — PANTOPRAZOLE 40 MILLIGRAM(S): 20 TABLET, DELAYED RELEASE ORAL at 05:17

## 2025-02-07 RX ADMIN — ATORVASTATIN CALCIUM 20 MILLIGRAM(S): 80 TABLET, FILM COATED ORAL at 21:10

## 2025-02-07 RX ADMIN — Medication 10 MILLIGRAM(S): at 05:17

## 2025-02-07 RX ADMIN — ASPIRIN 81 MILLIGRAM(S): 81 TABLET, COATED ORAL at 12:09

## 2025-02-07 RX ADMIN — ACETAMINOPHEN 650 MILLIGRAM(S): 160 SUSPENSION ORAL at 06:51

## 2025-02-07 RX ADMIN — LACOSAMIDE 200 MILLIGRAM(S): 200 TABLET, FILM COATED ORAL at 07:48

## 2025-02-07 RX ADMIN — LACOSAMIDE 200 MILLIGRAM(S): 200 TABLET, FILM COATED ORAL at 05:17

## 2025-02-07 RX ADMIN — LACOSAMIDE 130 MILLIGRAM(S): 200 TABLET, FILM COATED ORAL at 12:39

## 2025-02-07 NOTE — EEG REPORT - NS EEG TEXT BOX
-- DAY 1 	     -- START: 2/7/2025  1:52:33 AM        -- END: 	2/7/2025  08:00 AM     -- DURATION (HRS): ?5 hr 42 min     -----------------------------------------------------------------------------------------------------------     DAILY EEG VISUAL ANALYSIS     The background was continuous, symmetric, spontaneously variable and reactive. During wakefulness, the posterior dominant rhythm consisted of symmetric, well-modulated 7 Hz activity, with amplitude to 40 uV, that attenuated to eye opening. Low amplitude frontal beta was noted in wakefulness.      The anterior to posterior gradient was present.        BACKGROUND SLOWING:     Diffuse theta and delta frequencies were present.       FOCAL SLOWING:      None was present.       SLEEP BACKGROUND:     Drowsiness was characterized by fragmentation, attenuation, and slowing of the background activity.       Sleep was characterized by the presence of vertex waves, symmetric sleep spindles and K-complexes.       OTHER NON-EPILEPTIFORM FINDINGS:     None were present.       ACTIVATION PROCEDURES:      Hyperventilation was not performed.       Photic stimulation was not performed.       INTERICTAL EPILEPTIFORM ACTIVITY:      None were present.       EVENTS:     Three focal onset seizures from the right fronto-central region were present at 2:30, 03:21, 04:59. In all three seizures, the seizure electrographically starts with increased fast activity diffusely, particularly in the right frontocentral region. The seizure continues to rhythmic delta activity in the right frontal and frontocentral regions. There is a clear offset after about 1 minute in duration. Clinically, the patient was asleep during these events, with some jaw and mouth movements seen towards the end of the event, although video quality limits interpretation. The seizures were similar electroclinically.    Seizure #1:  d1 02:30:06		Seizure onset     d1 02:30:08		Diffuse fast activity     d1 02:30:09		Patient sleeping     d1 02:30:11		Slowing in the right frontocentral region     d1 02:30:20		2 - 3 Hz delta slowing in the right frontal and frontocentral region     d1 02:30:29		RDA with interspersed fast activity     d1 02:30:29		No clinical change     d1 02:30:37		Seizure continues     d1 02:30:42		Mouth movements and jaw clenching     d1 02:31:04		Seizure offset       ARTIFACTS:     Intermittent myogenic and movement artifacts were noted.        ECG:     The heart rate on single channel ECG was predominantly between 80 - 90 BPM.       ASMs:      None     -----------------------------------------------------------------------------------------------------------     EEG CLASSIFICATION:     Abnormal EEG in the awake, drowsy and asleep states.     -Three focal seizures, right frontocentral region, last at 04:59     -Generalized background slowing, mild (PDR < 8.5 Hz)       -----------------------------------------------------------------------------------------------------------     IMPRESSION/CLINICAL CORRELATE:     This is an abnormal EEG record.        Three recorded focal onset seizures from the right frontocentral reigon, consistent with a focal epilepsy.       Mild / diffuse muti-focal cerebral dysfunction, not specific in etiology.          This is a preliminary impression pending attending attestation.    -----------------------------------------------------------------------------------------------------------     José Beckwith DO     Fellow, [facility] [place]       Maryjane Isaac MD     Attending Physician, [facility] [place]  -- DAY 1 	     -- START: 2/7/2025  1:52:33 AM        -- END: 	2/7/2025  08:00 AM     -- DURATION (HRS): ?5 hr 42 min     -----------------------------------------------------------------------------------------------------------     DAILY EEG VISUAL ANALYSIS     The background was continuous, symmetric, spontaneously variable and reactive. During wakefulness, the posterior dominant rhythm consisted of symmetric, well-modulated 7 Hz activity, with amplitude to 40 uV, that attenuated to eye opening. Low amplitude frontal beta was noted in wakefulness.      The anterior to posterior gradient was present.        BACKGROUND SLOWING:     Diffuse theta and delta frequencies were present.       FOCAL SLOWING:      None was present.       SLEEP BACKGROUND:     Drowsiness was characterized by fragmentation, attenuation, and slowing of the background activity.       Sleep was characterized by the presence of vertex waves, symmetric sleep spindles and K-complexes.       OTHER NON-EPILEPTIFORM FINDINGS:     None were present.       ACTIVATION PROCEDURES:      Hyperventilation was not performed.       Photic stimulation was not performed.       INTERICTAL EPILEPTIFORM ACTIVITY:      None were present.       EVENTS:     Three focal onset seizures from the right fronto-central region were present at 2:30, 03:21, 04:59. In all three seizures, the seizure electrographically starts with increased fast activity diffusely, particularly in the right frontocentral region. The seizure continues to rhythmic delta activity in the right frontal and frontocentral regions. There is a clear offset after about 1 minute in duration. Clinically, the patient was asleep during these events, with some jaw and mouth movements seen towards the end of the event, although video quality limits interpretation. The seizures were similar electroclinically.    Seizure #1:  d1 02:30:06		Seizure onset     d1 02:30:08		Diffuse fast activity     d1 02:30:09		Patient sleeping     d1 02:30:11		Slowing in the right frontocentral region     d1 02:30:20		2 - 3 Hz delta slowing in the right frontal and frontocentral region     d1 02:30:29		RDA with interspersed fast activity     d1 02:30:29		No clinical change     d1 02:30:37		Seizure continues     d1 02:30:42		Mouth movements and jaw clenching     d1 02:31:04		Seizure offset       ARTIFACTS:     Intermittent myogenic and movement artifacts were noted.        ECG:     The heart rate on single channel ECG was predominantly between 80 - 90 BPM.       ASMs:      Vimpat 200 mg BID, Aptiom 1200 mg qHS, Lorazepam 2 mg once     -----------------------------------------------------------------------------------------------------------     EEG CLASSIFICATION:     Abnormal EEG in the awake, drowsy and asleep states.     -Three focal seizures, right frontocentral region, last at 04:59     -Generalized background slowing, mild (PDR < 8.5 Hz)       -----------------------------------------------------------------------------------------------------------     IMPRESSION/CLINICAL CORRELATE:     This is an abnormal EEG record.        Three recorded focal onset seizures from the right frontocentral reigon, consistent with a focal epilepsy.       Mild / diffuse muti-focal cerebral dysfunction, not specific in etiology.          This is a preliminary impression pending attending attestation.    -----------------------------------------------------------------------------------------------------------     José Beckwith DO     Fellow, [facility] [place]       Maryjane Isaac MD     Attending Physician, [facility] [place]  -- DAY 1 	     -- START: 2/7/2025  1:52:33 AM        -- END: 	2/7/2025  08:00 AM     -- DURATION (HRS): ?5 hr 42 min     -----------------------------------------------------------------------------------------------------------     DAILY EEG VISUAL ANALYSIS     The background was continuous, symmetric, spontaneously variable and reactive. During wakefulness, the posterior dominant rhythm consisted of symmetric, well-modulated 7 Hz activity, with amplitude to 40 uV, that attenuated to eye opening. Low amplitude frontal beta was noted in wakefulness.      The anterior to posterior gradient was present.        BACKGROUND SLOWING:     Diffuse theta and delta frequencies were present.       FOCAL SLOWING:      None was present.       SLEEP BACKGROUND:     Drowsiness was characterized by fragmentation, attenuation, and slowing of the background activity.       Sleep was characterized by the presence of vertex waves, symmetric sleep spindles and K-complexes.       OTHER NON-EPILEPTIFORM FINDINGS:     None were present.       ACTIVATION PROCEDURES:      Hyperventilation was not performed.       Photic stimulation was not performed.       INTERICTAL EPILEPTIFORM ACTIVITY:      None were present.       EVENTS:     Three focal onset seizures from the right fronto-central region were present at 2:30, 03:21, 04:59. In all three seizures, the seizure electrographically starts with increased fast activity diffusely, particularly in the right frontocentral region. The seizure continues to rhythmic delta activity in the right frontal and frontocentral regions. There is a clear offset after about 1 minute in duration. Clinically, the patient was asleep during these events, with some jaw and mouth movements seen towards the end of the event, although video quality limits interpretation. The seizures were similar electroclinically.    Seizure #1:  d1 02:30:06		Seizure onset     d1 02:30:08		Diffuse fast activity     d1 02:30:09		Patient sleeping     d1 02:30:11		Slowing in the right frontocentral region     d1 02:30:20		2 - 3 Hz delta slowing in the right frontal and frontocentral region     d1 02:30:29		RDA with interspersed fast activity     d1 02:30:29		No clinical change     d1 02:30:37		Seizure continues     d1 02:30:42		Mouth movements and jaw clenching     d1 02:31:04		Seizure offset       ARTIFACTS:     Intermittent myogenic and movement artifacts were noted.        ECG:     The heart rate on single channel ECG was predominantly between 80 - 90 BPM.       ASMs:      Vimpat 200 mg BID, Aptiom 1200 mg qHS, Lorazepam 2 mg once     -----------------------------------------------------------------------------------------------------------     EEG CLASSIFICATION:     Abnormal EEG in the awake, drowsy and asleep states.     -Three focal seizures, right frontocentral region, last at 04:59     -Generalized background slowing, mild (PDR < 8.5 Hz)       -----------------------------------------------------------------------------------------------------------     IMPRESSION/CLINICAL CORRELATE:     This is an abnormal EEG record.        Three recorded focal onset seizures from the right frontocentral reigon, consistent with a focal epilepsy.       Mild / diffuse muti-focal cerebral dysfunction, not specific in etiology.              -----------------------------------------------------------------------------------------------------------     José Beckwith DO     Fellow, [facility] [place]       Maryjane Isaac MD     Attending Physician, [facility] [place]

## 2025-02-08 PROCEDURE — 99232 SBSQ HOSP IP/OBS MODERATE 35: CPT

## 2025-02-08 PROCEDURE — 95720 EEG PHY/QHP EA INCR W/VEEG: CPT

## 2025-02-08 RX ORDER — LACOSAMIDE 200 MG/1
2 TABLET, FILM COATED ORAL
Qty: 120 | Refills: 3
Start: 2025-02-08

## 2025-02-08 RX ORDER — BRIVARACETAM 50 MG/1
100 TABLET, FILM COATED ORAL
Refills: 0 | Status: DISCONTINUED | OUTPATIENT
Start: 2025-02-08 | End: 2025-02-10

## 2025-02-08 RX ORDER — BRIVARACETAM 50 MG/1
1 TABLET, FILM COATED ORAL
Qty: 60 | Refills: 3
Start: 2025-02-08

## 2025-02-08 RX ADMIN — BRIVARACETAM 100 MILLIGRAM(S): 50 TABLET, FILM COATED ORAL at 05:38

## 2025-02-08 RX ADMIN — Medication 10 MILLIGRAM(S): at 05:39

## 2025-02-08 RX ADMIN — BRIVARACETAM 100 MILLIGRAM(S): 50 TABLET, FILM COATED ORAL at 17:44

## 2025-02-08 RX ADMIN — LACOSAMIDE 130 MILLIGRAM(S): 200 TABLET, FILM COATED ORAL at 20:35

## 2025-02-08 RX ADMIN — ASPIRIN 81 MILLIGRAM(S): 81 TABLET, COATED ORAL at 12:28

## 2025-02-08 RX ADMIN — LACOSAMIDE 130 MILLIGRAM(S): 200 TABLET, FILM COATED ORAL at 04:24

## 2025-02-08 RX ADMIN — ACETAMINOPHEN 650 MILLIGRAM(S): 160 SUSPENSION ORAL at 13:00

## 2025-02-08 RX ADMIN — ENOXAPARIN SODIUM 40 MILLIGRAM(S): 100 INJECTION SUBCUTANEOUS at 12:28

## 2025-02-08 RX ADMIN — LACOSAMIDE 130 MILLIGRAM(S): 200 TABLET, FILM COATED ORAL at 12:28

## 2025-02-08 RX ADMIN — ATORVASTATIN CALCIUM 20 MILLIGRAM(S): 80 TABLET, FILM COATED ORAL at 22:00

## 2025-02-08 RX ADMIN — PANTOPRAZOLE 40 MILLIGRAM(S): 20 TABLET, DELAYED RELEASE ORAL at 05:39

## 2025-02-08 NOTE — OCCUPATIONAL THERAPY INITIAL EVALUATION ADULT - LIVES WITH, PROFILE
Pt lives with wife in private home with 2 steps to enter, tub in bathroom. Pt I in ADLs and ambulation prior to admission

## 2025-02-08 NOTE — PROGRESS NOTE ADULT - ASSESSMENT
55M PMHx HTN, HLD, CAD s/p CABG. DVTs, aortic dissection s/p bypass graft, hemorrhagic pancreatitis, epilepsy who presented due to 4 seizure episodes on 2/6/2025. Episodes consistent of staring spells, drooling, abnormal eye movements, lasting 2 minutes and resolving spontaneously. Took clonazepam prior to coming to the hospital. Had an additional episode in the ED witnessed by staff, received lorazepam 2 mg. Patient endorses medication compliance, however wife is not sure if he remembers to take the medications every day. Follows with NP Delilah Patel at [facility]. Last EEG 10/2024 with occasional R temporal spikes. In ED, vital signs were stable. Labs with chronic anemia, non elevated lactate, otherwise unremarkable. Exam limited due to patient unwilling to participate and drowsy after lorazepam. No evidence of focal deficits noted.    Impression: Intractable epilepsy (CORTES seizures) by description. Possible medication nonadherence.    Plan:  [x] Continue vEEG  [x] Lacosamide, carbamazepine levels pending  [x] Continue brivaracetam 100mg PO q12h (sent to [facility]), lacosamide 150mg IV q8h (sent to [facility] as 300mg BID)  [] MRI brain w/wo (epilepsy protocol) tomorrow AM (2/9)  [x] Vitals/neurochecks q4h  [x] Seizure/fall/aspiration precautions    DVT prophylaxis: enoxaparin 40mg q24h  Diet: Regular  Dispo: PT/OT - both no needs, likely d/c on Sunday, 2/9 (tomorrow); needs outpatient f/u with epileptologist    Plan d/w patient and wife at bedside. Seen and d/w Dr. Isaac.   Island Pedicle Flap-Requiring Vessel Identification Text: The defect edges were debeveled with a #15 scalpel blade.  Given the location of the defect, shape of the defect and the proximity to free margins an island pedicle advancement flap was deemed most appropriate.  Using a sterile surgical marker, an appropriate advancement flap was drawn, based on the axial vessel mentioned above, incorporating the defect, outlining the appropriate donor tissue and placing the expected incisions within the relaxed skin tension lines where possible.    The area thus outlined was incised deep to adipose tissue with a #15 scalpel blade.  The skin margins were undermined to an appropriate distance in all directions around the primary defect and laterally outward around the island pedicle utilizing iris scissors.  There was minimal undermining beneath the pedicle flap.

## 2025-02-08 NOTE — PROGRESS NOTE ADULT - SUBJECTIVE AND OBJECTIVE BOX
Neurology Progress Note    SUBJECTIVE/OBJECTIVE/INTERVAL EVENTS: Patient seen and examined at bedside w/ neuro attending and team.     INTERVAL HISTORY:  NAEON.    This AM - patient is accompanied by his wife. He asks, "Why do you look familiar?" Attending has been seeing patient every day. Discussed diagnosis/prognosis/plan with patient and his wife. We discussed that patient has seizures in sleep - events are not seen clinically. Patient continually asking about epilepsy surgery. Needs re-direction. Wife says patient did well with LEV, but he had mood changes.    REVIEW OF SYSTEMS: Few questions of a 10-system ROS was performed and is negative except for those items noted above and/or in the HPI.    VITALS & EXAMINATION:  Vital Signs Last 24 Hrs  T(C): 37.2 (08 Feb 2025 12:51), Max: 37.2 (08 Feb 2025 12:51)  T(F): 99 (08 Feb 2025 12:51), Max: 99 (08 Feb 2025 12:51)  HR: 89 (08 Feb 2025 12:51) (74 - 90)  BP: 112/76 (08 Feb 2025 12:51) (112/76 - 136/73)  BP(mean): --  RR: 18 (08 Feb 2025 12:51) (18 - 18)  SpO2: 98% (08 Feb 2025 12:51) (97% - 100%)    Parameters below as of 08 Feb 2025 12:51  Patient On (Oxygen Delivery Method): room air    General:  Constitutional: Male, appears stated age, nontoxic, not in distress, sitting up in bed   Head: vEEG in place  Eyes: clear sclera;   Extremities: No cyanosis;   Resp: breathing comfortably on RA     Neurological (>12):  MS: Awake, alert.  Attends to examiner.  Language: Speech is fluent.  CNs: Pupils equal and reactive, EOM intact, no gaze preference or deviation, no facial asymmetry, normal hearing to speech  Motor: GODINEZ symmetrically  Sensation: Light touch/pinprick intact in all extremities  Gait: Deferred - vEEG in place    LABORATORY:  CBC                       9.0    10.04 )-----------( 290      ( 06 Feb 2025 20:19 )             29.2     Chem 02-06    137  |  98  |  20  ----------------------------<  116[H]  4.7   |  30  |  0.87    Ca    9.2      06 Feb 2025 20:19  Phos  4.0     02-06  Mg     2.3     02-06    TPro  6.6  /  Alb  4.1  /  TBili  0.7  /  DBili  x   /  AST  72[H]  /  ALT  33  /  AlkPhos  96  02-06    LFTs LIVER FUNCTIONS - ( 06 Feb 2025 20:19 )  Alb: 4.1 g/dL / Pro: 6.6 g/dL / ALK PHOS: 96 U/L / ALT: 33 U/L / AST: 72 U/L / GGT: x           Coagulopathy PT/INR - ( 06 Feb 2025 20:19 )   PT: 11.0 sec;   INR: 0.96 ratio         PTT - ( 06 Feb 2025 20:19 )  PTT:31.6 sec  Lipid Panel   A1c   Cardiac enzymes     U/A Urinalysis Basic - ( 06 Feb 2025 20:19 )    Color: x / Appearance: x / SG: x / pH: x  Gluc: 116 mg/dL / Ketone: x  / Bili: x / Urobili: x   Blood: x / Protein: x / Nitrite: x   Leuk Esterase: x / RBC: x / WBC x   Sq Epi: x / Non Sq Epi: x / Bacteria: x      CSF  Immunological  Other    STUDIES & IMAGING: (EEG, CT, MR, U/S, TTE/BARBIE):  EEG CLASSIFICATION (2/7/2025):     Abnormal EEG in the awake, drowsy and asleep states.     -Three focal seizures, right frontocentral region, last at 04:59     -Generalized background slowing, mild (PDR < 8.5 Hz)       -----------------------------------------------------------------------------------------------------------     IMPRESSION/CLINICAL CORRELATE:     This is an abnormal EEG record.        Three recorded focal onset seizures from the right frontocentral reigon, consistent with a focal epilepsy.       Mild / diffuse muti-focal cerebral dysfunction, not specific in etiology.

## 2025-02-08 NOTE — PHYSICAL THERAPY INITIAL EVALUATION ADULT - PERTINENT HX OF CURRENT PROBLEM, REHAB EVAL
Pt is a 55M PMHx HTN, HLD, CAD s/p CABG. DVTs, aortic dissection s/p bypass graft, hemorrhagic pancreatitis, epilepsy who presented due to 4 seizure episodes today. Episodes consistent of staring spells, drooling, abnormal eye movements, lasting 2 minutes and resolving spontaneously. Took clonazepam prior to coming to the hospital. Had an additional episode in the ED witnessed by staff, received Ativan 2 mg. Patient endorses medication compliance, however wife is not sure if he remembers to take the medications every day. Follows with NP Delilah Patel at [facility]. Last EEG 10/2024 with occasional R temporal spikes. In ED, vital signs were stable. Labs with chronic anemia, non elevated lactate, otherwise unremarkable. Exam limited due to patient unwilling to participate and drowsy after Ativan. No evidence of focal deficits noted.    Impression: Focal epilepsy with impaired awareness s/p 4 episodes today, breakthrough seizures vs medication noncompliance. EMU admission for event capture and medication adjustment.    Imaging: (-) CTH 2/6/25.

## 2025-02-08 NOTE — OCCUPATIONAL THERAPY INITIAL EVALUATION ADULT - PERTINENT HX OF CURRENT PROBLEM, REHAB EVAL
55-year-old male with PMHx HTN, aortic dissection (s/p aortic bifurcation bypass graft), CAD (s/p CABG), DVTs (s/p IVC filter removed 9/2023), hemorrhagic pancreatitis, seizure disorder (on Vimpat and Aptiom, follows with CRISTIAN Patel) who presented to the ED due to multiple seizure episodes at home. Per wife at the bedside, patient experienced 3 episodes of unresponsiveness/staring with drooling, in one case associated with urinary incontinence. Episodes were not consecutive, each one lasted for about 2 minutes prior to resolving spontaneously. After patient's third episode of the day, his wife gave him clonazepam, however once he arrived to the ED patient had another similar episode witnessed by ED staff for which patient received Ativan 2 mg. Per wife, patient had 2 additional episodes back on 2/4, prior to that he had one episode in December. Semiology of today's events is consistent with his usual seizure episodes. Patient reports taking his medications today, however per wife patient has a poor memory and she sure if he always remembers to take the medications. Denies recent infection or other triggering event. Patient was first diagnosed with epilepsy in 2018 when he experienced several GTCs. He was initially placed on Keppra and remained seizure free through 2019, however experienced recurrence in 2020 and was switched to depakote. Vimpat was later added due to poor seizure control. Patient had episode of hemorrhagic pancreatitis in 2023 and was switched off depakote and started on Aptiom. Current regimen is Vimpat 200 mg BID, Aptiom 1200 mg qHS. Patient last underwent inpatient EEG monitoring in 10/2024, which showed occasional R temporal discharges.    CT Head:  No evidence of acute intracranial pathology.

## 2025-02-08 NOTE — PROGRESS NOTE ADULT - ATTENDING COMMENTS
improved  surgical candidate  tolerated briviact well in addition to vimpat  currently vimpat 300 bid  briviact 100 bid

## 2025-02-08 NOTE — PHYSICAL THERAPY INITIAL EVALUATION ADULT - NSPTDISCHREC_GEN_A_CORE
Patient is cleared for D/C home from physical therapy standpoint. Patient is agreeable, RN Lo and  Shea aware./No skilled PT needs

## 2025-02-09 LAB
ALBUMIN SERPL ELPH-MCNC: 3.9 G/DL — SIGNIFICANT CHANGE UP (ref 3.3–5)
ALP SERPL-CCNC: 93 U/L — SIGNIFICANT CHANGE UP (ref 40–120)
ALT FLD-CCNC: 28 U/L — SIGNIFICANT CHANGE UP (ref 10–45)
ANION GAP SERPL CALC-SCNC: 18 MMOL/L — HIGH (ref 5–17)
AST SERPL-CCNC: 51 U/L — HIGH (ref 10–40)
BILIRUB SERPL-MCNC: 0.3 MG/DL — SIGNIFICANT CHANGE UP (ref 0.2–1.2)
BUN SERPL-MCNC: 23 MG/DL — SIGNIFICANT CHANGE UP (ref 7–23)
CALCIUM SERPL-MCNC: 9.3 MG/DL — SIGNIFICANT CHANGE UP (ref 8.4–10.5)
CHLORIDE SERPL-SCNC: 101 MMOL/L — SIGNIFICANT CHANGE UP (ref 96–108)
CO2 SERPL-SCNC: 21 MMOL/L — LOW (ref 22–31)
CREAT SERPL-MCNC: 1.07 MG/DL — SIGNIFICANT CHANGE UP (ref 0.5–1.3)
EGFR: 81 ML/MIN/1.73M2 — SIGNIFICANT CHANGE UP
GLUCOSE SERPL-MCNC: 107 MG/DL — HIGH (ref 70–99)
HCT VFR BLD CALC: 33.5 % — LOW (ref 39–50)
HGB BLD-MCNC: 10.4 G/DL — LOW (ref 13–17)
MCHC RBC-ENTMCNC: 29.1 PG — SIGNIFICANT CHANGE UP (ref 27–34)
MCHC RBC-ENTMCNC: 31 G/DL — LOW (ref 32–36)
MCV RBC AUTO: 93.6 FL — SIGNIFICANT CHANGE UP (ref 80–100)
NRBC # BLD: 0 /100 WBCS — SIGNIFICANT CHANGE UP (ref 0–0)
NRBC BLD-RTO: 0 /100 WBCS — SIGNIFICANT CHANGE UP (ref 0–0)
PLATELET # BLD AUTO: 284 K/UL — SIGNIFICANT CHANGE UP (ref 150–400)
POTASSIUM SERPL-MCNC: 4 MMOL/L — SIGNIFICANT CHANGE UP (ref 3.5–5.3)
POTASSIUM SERPL-SCNC: 4 MMOL/L — SIGNIFICANT CHANGE UP (ref 3.5–5.3)
PROT SERPL-MCNC: 6.6 G/DL — SIGNIFICANT CHANGE UP (ref 6–8.3)
RBC # BLD: 3.58 M/UL — LOW (ref 4.2–5.8)
RBC # FLD: 23.4 % — HIGH (ref 10.3–14.5)
SODIUM SERPL-SCNC: 140 MMOL/L — SIGNIFICANT CHANGE UP (ref 135–145)
WBC # BLD: 6.55 K/UL — SIGNIFICANT CHANGE UP (ref 3.8–10.5)
WBC # FLD AUTO: 6.55 K/UL — SIGNIFICANT CHANGE UP (ref 3.8–10.5)

## 2025-02-09 PROCEDURE — 70553 MRI BRAIN STEM W/O & W/DYE: CPT | Mod: 26

## 2025-02-09 PROCEDURE — 76377 3D RENDER W/INTRP POSTPROCES: CPT | Mod: 26

## 2025-02-09 PROCEDURE — 99232 SBSQ HOSP IP/OBS MODERATE 35: CPT

## 2025-02-09 RX ADMIN — LACOSAMIDE 130 MILLIGRAM(S): 200 TABLET, FILM COATED ORAL at 21:03

## 2025-02-09 RX ADMIN — BRIVARACETAM 100 MILLIGRAM(S): 50 TABLET, FILM COATED ORAL at 17:05

## 2025-02-09 RX ADMIN — LACOSAMIDE 130 MILLIGRAM(S): 200 TABLET, FILM COATED ORAL at 04:16

## 2025-02-09 RX ADMIN — PANTOPRAZOLE 40 MILLIGRAM(S): 20 TABLET, DELAYED RELEASE ORAL at 05:23

## 2025-02-09 RX ADMIN — ENOXAPARIN SODIUM 40 MILLIGRAM(S): 100 INJECTION SUBCUTANEOUS at 11:19

## 2025-02-09 RX ADMIN — BRIVARACETAM 100 MILLIGRAM(S): 50 TABLET, FILM COATED ORAL at 05:23

## 2025-02-09 RX ADMIN — LACOSAMIDE 130 MILLIGRAM(S): 200 TABLET, FILM COATED ORAL at 11:14

## 2025-02-09 RX ADMIN — ASPIRIN 81 MILLIGRAM(S): 81 TABLET, COATED ORAL at 11:19

## 2025-02-09 RX ADMIN — ATORVASTATIN CALCIUM 20 MILLIGRAM(S): 80 TABLET, FILM COATED ORAL at 21:04

## 2025-02-09 RX ADMIN — Medication 10 MILLIGRAM(S): at 05:23

## 2025-02-09 NOTE — DISCHARGE NOTE PROVIDER - CARE PROVIDER_API CALL
Maryjane Isaac  Neurology  [address], [facility] [address]  [place] [address]  Phone: [phone]  Fax: [phone]  Follow Up Time: 2 weeks

## 2025-02-09 NOTE — PROGRESS NOTE ADULT - ASSESSMENT
55M PMHx HTN, HLD, CAD s/p CABG. DVTs, aortic dissection s/p bypass graft, hemorrhagic pancreatitis, epilepsy who presented due to 4 seizure episodes on 2/6/2025. Episodes consistent of staring spells, drooling, abnormal eye movements, lasting 2 minutes and resolving spontaneously. Took clonazepam prior to coming to the hospital. Had an additional episode in the ED witnessed by staff, received lorazepam 2 mg. Patient endorses medication compliance, however wife is not sure if he remembers to take the medications every day. Follows with NP Delilah Patel at [facility]. Last EEG 10/2024 with occasional R temporal spikes. In ED, vital signs were stable. Labs with chronic anemia, non elevated lactate, otherwise unremarkable. Exam limited due to patient unwilling to participate and drowsy after lorazepam. No evidence of focal deficits noted.    Impression: Intractable epilepsy (CORTES seizures) by description. Possible medication nonadherence.    Plan:  [x] Continue vEEG  [x] Lacosamide, carbamazepine levels pending  [x] Continue brivaracetam 100mg PO q12h (sent to [facility]), lacosamide 150mg IV q8h (sent to [facility] as 300mg BID)  [] MRI brain w/wo (epilepsy protocol) today 2/9  [x] Vitals/neurochecks q4h  [x] Seizure/fall/aspiration precautions    DVT prophylaxis: enoxaparin 40mg q24h  Diet: Regular  Dispo: PT/OT - both no needs, likely d/c on Monday, 2/10 (tomorrow); needs outpatient f/u with epileptologist      Plan d/w patient and wife at bedside. Seen and d/w Dr. Isaac.       55M PMHx HTN, HLD, CAD s/p CABG. DVTs, aortic dissection s/p bypass graft, hemorrhagic pancreatitis, epilepsy who presented due to 4 seizure episodes on 2/6/2025. Episodes consistent of staring spells, drooling, abnormal eye movements, lasting 2 minutes and resolving spontaneously. Took clonazepam prior to coming to the hospital. Had an additional episode in the ED witnessed by staff, received lorazepam 2 mg. Patient endorses medication compliance, however wife is not sure if he remembers to take the medications every day. Follows with NP Delilah Patel at [facility]. Last EEG 10/2024 with occasional R temporal spikes. In ED, vital signs were stable. Labs with chronic anemia, non elevated lactate, otherwise unremarkable. Exam limited due to patient unwilling to participate and drowsy after lorazepam. No evidence of focal deficits noted.    Impression: Intractable epilepsy (CORTES seizures) by description. Possible medication nonadherence.    Plan:  [x] Stop vEEG  [x] Lacosamide, carbamazepine levels pending  [x] Continue brivaracetam 100mg PO q12h (sent to [facility]), lacosamide 150mg IV q8h (sent to [facility] as 300mg BID)  [] MRI brain w/wo (epilepsy protocol) today 2/9  [x] Vitals/neurochecks q4h  [x] Seizure/fall/aspiration precautions    DVT prophylaxis: enoxaparin 40mg q24h  Diet: Regular  Dispo: PT/OT - both no needs, likely d/c on Monday, 2/10 (tomorrow); needs outpatient f/u with epileptologist      Plan d/w patient and wife at bedside. Seen and d/w Dr. Isaac.

## 2025-02-09 NOTE — DISCHARGE NOTE PROVIDER - NSDCFUSCHEDAPPT_GEN_ALL_CORE_FT
[facility]  [facility] [address]  Scheduled Appointment: 05/09/2025

## 2025-02-09 NOTE — DISCHARGE NOTE PROVIDER - HOSPITAL COURSE
02-06-25    One Liner:  HPI:  Ovidio Villalobos is a 55-year-old male with PMHx HTN, aortic dissection (s/p aortic bifurcation bypass graft), CAD (s/p CABG), DVTs (s/p IVC filter removed 9/2023), hemorrhagic pancreatitis, seizure disorder (on Vimpat and Aptiom, follows with NP Delilah Patel) who presented to the ED due to multiple seizure episodes at home. Per wife at the bedside, patient experienced 3 episodes of unresponsiveness/staring with drooling, in one case associated with urinary incontinence. Episodes were not consecutive, each one lasted for about 2 minutes prior to resolving spontaneously. After patient's third episode of the day, his wife gave him clonazepam, however once he arrived to the ED patient had another similar episode witnessed by ED staff for which patient received Ativan 2 mg. Per wife, patient had 2 additional episodes back on 2/4, prior to that he had one episode in December. Semiology of today's events is consistent with his usual seizure episodes. Patient reports taking his medications today, however per wife patient has a poor memory and she sure if he always remembers to take the medications. Denies recent infection or other triggering event.     Patient was first diagnosed with epilepsy in 2018 when he experienced several GTCs. He was initially placed on Keppra and remained seizure free through 2019, however experienced recurrence in 2020 and was switched to depakote. Vimpat was later added due to poor seizure control. Patient had episode of hemorrhagic pancreatitis in 2023 and was switched off depakote and started on Aptiom. Current regimen is Vimpat 200 mg BID, Aptiom 1200 mg qHS. Patient last underwent inpatient EEG monitoring in 10/2024, which showed occasional R temporal discharges.    Semiology: Initially GTCs, however since 2020 has not had any of these episodes. Current episodes are described as staring spells with aura of stomach rising.  (06 Feb 2025 23:26)      Home Medications:  amLODIPine 10 mg oral tablet: 1 tab(s) orally once a day (07 Feb 2025 00:46)  hydroCHLOROthiazide 12.5 mg oral capsule: 1 cap(s) orally once a day (07 Feb 2025 00:46)  lacosamide 200 mg oral tablet: 1 tab(s) orally every 12 hours (07 Feb 2025 00:46)      Hospital Course: The patient was admitted to the epilepsy monitoring unit from 2/6-2/9 2025, EEG during this time showed focal seizures from the R frontocentral region.  With background slowing. During the hospitalization the patient was maintained on lacosamide 150 mg BID, Briviact 100 mg BUD    Vitals on DC:  T(C): 36.9 (02-09-25 @ 09:24), Max: 37.3 (02-09-25 @ 00:41)  HR: 73 (02-09-25 @ 09:24) (73 - 89)  BP: 123/74 (02-09-25 @ 09:24) (106/74 - 127/80)  RR: 18 (02-09-25 @ 09:24) (18 - 18)  SpO2: 98% (02-09-25 @ 09:24) (97% - 98%)                            10.4   6.55  )-----------( 284      ( 09 Feb 2025 06:53 )             33.5     02-09    140  |  101  |  23  ----------------------------<  107[H]  4.0   |  21[L]  |  1.07    Ca    9.3      09 Feb 2025 06:51    TPro  6.6  /  Alb  3.9  /  TBili  0.3  /  DBili  x   /  AST  51[H]  /  ALT  28  /  AlkPhos  93  02-09      EEG 2/7-2/8  EEG CLASSIFICATION (2/7/2025):     Abnormal EEG in the awake, drowsy and asleep states.   -Three focal seizures, right frontocentral region, last at 04:59   -Generalized background slowing, mild (PDR < 8.5 Hz)   IMPRESSION/CLINICAL CORRELATE:   This is an abnormal EEG record.    Three recorded focal onset seizures from the right frontocentral reigon, consistent with a focal epilepsy.   Mild / diffuse muti-focal cerebral dysfunction, not specific in etiology.           Follow up   HPI:  Ovidio Villalobos is a 55-year-old male with PMHx HTN, aortic dissection (s/p aortic bifurcation bypass graft), CAD (s/p CABG), DVTs (s/p IVC filter removed 9/2023), hemorrhagic pancreatitis, seizure disorder (on Vimpat and Aptiom, follows with NP Delilah Patel) who presented to the ED due to multiple seizure episodes at home. Per wife at the bedside, patient experienced 3 episodes of unresponsiveness/staring with drooling, in one case associated with urinary incontinence. Episodes were not consecutive, each one lasted for about 2 minutes prior to resolving spontaneously. After patient's third episode of the day, his wife gave him clonazepam, however once he arrived to the ED patient had another similar episode witnessed by ED staff for which patient received Ativan 2 mg. Per wife, patient had 2 additional episodes back on 2/4, prior to that he had one episode in December. Semiology of today's events is consistent with his usual seizure episodes. Patient reports taking his medications today, however per wife patient has a poor memory and she sure if he always remembers to take the medications. Denies recent infection or other triggering event.       Hospital Course: The patient was admitted to the epilepsy monitoring unit from 2/6-2/9 2025, EEG during this time showed focal seizures from the R frontocentral region, with background slowing. During the hospitalization, the patient's AED were adjusted. He is now on lacosamide 150 mg TID, Briviact 100 mg BID. No seizure or symptoms reported since 2/8/25. Patient can continue current AED and follow up outpatient.        EEG DAY 3	 START: 2/8/2025 -	2/9/2025    EEG CLASSIFICATION:   Abnormal EEG in the awake, drowsy and asleep states.   -No seizures in this recording.  -Generalized background slowing, mild (PDR < 8.5 Hz)   ----------------------------------------------------------------------------------------------------------   IMPRESSION/CLINICAL CORRELATE:   This is an abnormal EEG record.    No seizures recorded.  Mild / diffuse muti-focal cerebral dysfunction, not specific in etiology.     EEG 2/7-2/8  EEG CLASSIFICATION (2/7/2025):     Abnormal EEG in the awake, drowsy and asleep states.   -Three focal seizures, right frontocentral region, last at 04:59   -Generalized background slowing, mild (PDR < 8.5 Hz)   IMPRESSION/CLINICAL CORRELATE:   This is an abnormal EEG record.    Three recorded focal onset seizures from the right frontocentral reigon, consistent with a focal epilepsy.   Mild / diffuse muti-focal cerebral dysfunction, not specific in etiology.         Follow up:  Patient is medically stable for discharge.  Patient can continue AED:  Patient can follow up with epileptologist Dr. Isaac outpatient and his PCP outpatient.   HPI:  Ovidio Villalobos is a 55-year-old male with PMHx HTN, aortic dissection (s/p aortic bifurcation bypass graft), CAD (s/p CABG), DVTs (s/p IVC filter removed 9/2023), hemorrhagic pancreatitis, seizure disorder (on Vimpat and Aptiom, follows with NP Delilah Patel) who presented to the ED due to multiple seizure episodes at home. Per wife at the bedside, patient experienced 3 episodes of unresponsiveness/staring with drooling, in one case associated with urinary incontinence. Episodes were not consecutive, each one lasted for about 2 minutes prior to resolving spontaneously. After patient's third episode of the day, his wife gave him clonazepam, however once he arrived to the ED patient had another similar episode witnessed by ED staff for which patient received Ativan 2 mg. Per wife, patient had 2 additional episodes back on 2/4, prior to that he had one episode in December. Semiology of today's events is consistent with his usual seizure episodes. Patient reports taking his medications today, however per wife patient has a poor memory and she sure if he always remembers to take the medications. Denies recent infection or other triggering event.       Hospital Course: The patient was admitted to the epilepsy monitoring unit from 2/6-2/9 2025, EEG during this time showed focal seizures from the R frontocentral region, with background slowing. During the hospitalization, the patient's AED were adjusted. He is now on lacosamide 300 mg BID, Briviact 100 mg BID. No seizure or symptoms reported since 2/8/25. Patient can continue current AED and follow up outpatient.        EEG DAY 3	 START: 2/8/2025 -	2/9/2025    EEG CLASSIFICATION:   Abnormal EEG in the awake, drowsy and asleep states.   -No seizures in this recording.  -Generalized background slowing, mild (PDR < 8.5 Hz)   ----------------------------------------------------------------------------------------------------------   IMPRESSION/CLINICAL CORRELATE:   This is an abnormal EEG record.    No seizures recorded.  Mild / diffuse muti-focal cerebral dysfunction, not specific in etiology.     EEG 2/7-2/8  EEG CLASSIFICATION (2/7/2025):     Abnormal EEG in the awake, drowsy and asleep states.   -Three focal seizures, right frontocentral region, last at 04:59   -Generalized background slowing, mild (PDR < 8.5 Hz)   IMPRESSION/CLINICAL CORRELATE:   This is an abnormal EEG record.    Three recorded focal onset seizures from the right frontocentral reigon, consistent with a focal epilepsy.   Mild / diffuse muti-focal cerebral dysfunction, not specific in etiology.      MR Brain-Seizure, Epilepsy w/wo IV Cont (02.09.25 @ 12:31)   IMPRESSION: Nonspecific patchy white matter changes. 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 No abnormal enhancement. Neuro Quant data available.        Follow up:  Patient is medically stable for discharge.  Patient can continue AED: Lacosamide 300mg BID, and briviact 100 mg BID  Patient can follow up with epileptologist Dr. Isaac outpatient and his PCP outpatient.

## 2025-02-09 NOTE — DISCHARGE NOTE PROVIDER - NSDCMRMEDTOKEN_GEN_ALL_CORE_FT
amLODIPine 10 mg oral tablet: 1 tab(s) orally once a day  Aspirin Enteric Coated 81 mg oral delayed release tablet: 1 tab(s) orally once a day  atorvastatin 20 mg oral tablet: 1 tab(s) orally once a day (at bedtime)  Briviact 100 mg oral tablet: 1 tab(s) orally 2 times a day MDD: 200mg  eslicarbazepine 600 mg oral tablet: 2 tab(s) orally once a day  hydroCHLOROthiazide 12.5 mg oral capsule: 1 cap(s) orally once a day  lacosamide 150 mg oral tablet: 2 tab(s) orally 2 times a day MDD: 600mg  lacosamide 200 mg oral tablet: 1 tab(s) orally every 12 hours  pantoprazole 40 mg oral delayed release tablet: 1 tab(s) orally once a day (before a meal)   amLODIPine 10 mg oral tablet: 1 tab(s) orally once a day  Aspirin Enteric Coated 81 mg oral delayed release tablet: 1 tab(s) orally once a day  atorvastatin 20 mg oral tablet: 1 tab(s) orally once a day (at bedtime)  Briviact 100 mg oral tablet: 1 tab(s) orally 2 times a day MDD: 200mg  hydroCHLOROthiazide 12.5 mg oral capsule: 1 cap(s) orally once a day  lacosamide 150 mg oral tablet: 2 tab(s) orally 2 times a day MDD: 600mg  pantoprazole 40 mg oral delayed release tablet: 1 tab(s) orally once a day (before a meal)

## 2025-02-09 NOTE — DISCHARGE NOTE PROVIDER - NSDCCPCAREPLAN_GEN_ALL_CORE_FT
PRINCIPAL DISCHARGE DIAGNOSIS  Diagnosis: Seizure  Assessment and Plan of Treatment: You were admitted to the epilepsy monitoring unit from 2/6-2/9 2025, You were placed on video EEG. Video EEG during this time showed focal seizures from the R frontocentral region, with background slowing. During the hospitalization the your AED were adjusted, you're now on on lacosamide 150 mg TID, Briviact 100 mg BID. No seizure or symptoms reported since 2/8/25. You can continue current AED and follow up outpatient.     PRINCIPAL DISCHARGE DIAGNOSIS  Diagnosis: Seizure  Assessment and Plan of Treatment: You were admitted to the epilepsy monitoring unit from 2/6-2/9 2025, You were placed on video EEG. Video EEG during this time showed focal seizures from the R frontocentral region, with background slowing. During the hospitalization the your AED were adjusted, you're now on on lacosamide 300mg BID, Briviact 100 mg BID. No seizure or symptoms reported since 2/8/25. You can continue current AED and follow up outpatient with Dr. Isaac.

## 2025-02-09 NOTE — EEG REPORT - NS EEG TEXT BOX
-- DAY 3	     -- START: 2/8/2025  08:00 AM        -- END: 	2/9/2025  08:00 AM     -- DURATION (HRS): ?24 hr    -----------------------------------------------------------------------------------------------------------     DAILY EEG VISUAL ANALYSIS     The background was continuous, symmetric, spontaneously variable and reactive. During wakefulness, the posterior dominant rhythm consisted of symmetric, well-modulated 7 Hz activity, with amplitude to 40 uV, that attenuated to eye opening. Low amplitude frontal beta was noted in wakefulness.      The anterior to posterior gradient was present.        BACKGROUND SLOWING:     Diffuse theta and delta frequencies were present.       FOCAL SLOWING:      None was present.       SLEEP BACKGROUND:     Drowsiness was characterized by fragmentation, attenuation, and slowing of the background activity.       Sleep was characterized by the presence of vertex waves, symmetric sleep spindles and K-complexes.       OTHER NON-EPILEPTIFORM FINDINGS:     None were present.       ACTIVATION PROCEDURES:      Hyperventilation was not performed.       Photic stimulation was not performed.       INTERICTAL EPILEPTIFORM ACTIVITY:      None were present.       EVENTS:     none in this recording  ARTIFACTS:     Intermittent myogenic and movement artifacts were noted.        ECG:     The heart rate on single channel ECG was predominantly between 80 - 90 BPM.       ASMs:      Vimpat 200 mg BID, off Aptiom 1200 mg qHS, Lorazepam 2 mg once   Briviact 100 bid    -----------------------------------------------------------------------------------------------------------     EEG CLASSIFICATION:     Abnormal EEG in the awake, drowsy and asleep states.     -No seizures in this recording.    -Generalized background slowing, mild (PDR < 8.5 Hz)       -----------------------------------------------------------------------------------------------------------     IMPRESSION/CLINICAL CORRELATE:     This is an abnormal EEG record.        No seizures recorded.      Mild / diffuse muti-focal cerebral dysfunction, not specific in etiology.              -----------------------------------------------------------------------------------------------------------         Maryjane Isaac MD     Attending Physician, [facility] [place]

## 2025-02-09 NOTE — DISCHARGE NOTE PROVIDER - NSDCCPTREATMENT_GEN_ALL_CORE_FT
PRINCIPAL PROCEDURE  Procedure: EEG, with video recording, 36-60 hours  Findings and Treatment: EEG DAY 3	 START: 2/8/2025 -	2/9/2025    EEG CLASSIFICATION:   Abnormal EEG in the awake, drowsy and asleep states.   -No seizures in this recording.  -Generalized background slowing, mild (PDR < 8.5 Hz)   ----------------------------------------------------------------------------------------------------------   IMPRESSION/CLINICAL CORRELATE:   This is an abnormal EEG record.    No seizures recorded.  Mild / diffuse muti-focal cerebral dysfunction, not specific in etiology.   EEG 2/7-2/8  EEG CLASSIFICATION (2/7/2025):   Abnormal EEG in the awake, drowsy and asleep states.   -Three focal seizures, right frontocentral region, last at 04:59   -Generalized background slowing, mild (PDR < 8.5 Hz)   IMPRESSION/CLINICAL CORRELATE:   This is an abnormal EEG record.    Three recorded focal onset seizures from the right frontocentral reigon, consistent with a focal epilepsy.   Mild / diffuse muti-focal cerebral dysfunction, not specific in etiology.      SECONDARY PROCEDURE  Procedure: MR brain wo/w con  Findings and Treatment: MR Brain-Seizure, Epilepsy w/wo IV Cont (02.09.25 @ 12:31)   IMPRESSION: Nonspecific patchy white matter changes. 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 No abnormal enhancement. Neuro Quant data available.

## 2025-02-09 NOTE — PROGRESS NOTE ADULT - SUBJECTIVE AND OBJECTIVE BOX
Neurology Progress Note    SUBJECTIVE/OBJECTIVE/INTERVAL EVENTS: Patient seen and examined at bedside w/ neuro attending and team.     INTERVAL HISTORY:  GRACIELA. Talked at length with wife, she is comfortable with the current ASM plan.     REVIEW OF SYSTEMS: Few questions of a 10-system ROS was performed and is negative except for those items noted above and/or in the HPI.    VITALS & EXAMINATION:  Vital Signs Last 24 Hrs  T(C): 37.2 (08 Feb 2025 12:51), Max: 37.2 (08 Feb 2025 12:51)  T(F): 99 (08 Feb 2025 12:51), Max: 99 (08 Feb 2025 12:51)  HR: 89 (08 Feb 2025 12:51) (74 - 90)  BP: 112/76 (08 Feb 2025 12:51) (112/76 - 136/73)  BP(mean): --  RR: 18 (08 Feb 2025 12:51) (18 - 18)  SpO2: 98% (08 Feb 2025 12:51) (97% - 100%)    Parameters below as of 08 Feb 2025 12:51  Patient On (Oxygen Delivery Method): room air    General:  Constitutional: Male, appears stated age, nontoxic, not in distress, sitting up in bed   Head: vEEG in place  Eyes: clear sclera;   Extremities: No cyanosis;   Resp: breathing comfortably on RA     Neurological (>12):  MS: Awake, alert.  Attends to examiner.  Language: Speech is fluent.  CNs: Pupils equal and reactive, EOM intact, no gaze preference or deviation, no facial asymmetry, normal hearing to speech  Motor: GODINEZ symmetrically  Sensation: Light touch/pinprick intact in all extremities  Gait: Deferred - vEEG in place    LABORATORY:  CBC                       9.0    10.04 )-----------( 290      ( 06 Feb 2025 20:19 )             29.2     Chem 02-06    137  |  98  |  20  ----------------------------<  116[H]  4.7   |  30  |  0.87    Ca    9.2      06 Feb 2025 20:19  Phos  4.0     02-06  Mg     2.3     02-06    TPro  6.6  /  Alb  4.1  /  TBili  0.7  /  DBili  x   /  AST  72[H]  /  ALT  33  /  AlkPhos  96  02-06    LFTs LIVER FUNCTIONS - ( 06 Feb 2025 20:19 )  Alb: 4.1 g/dL / Pro: 6.6 g/dL / ALK PHOS: 96 U/L / ALT: 33 U/L / AST: 72 U/L / GGT: x           Coagulopathy PT/INR - ( 06 Feb 2025 20:19 )   PT: 11.0 sec;   INR: 0.96 ratio         PTT - ( 06 Feb 2025 20:19 )  PTT:31.6 sec  Lipid Panel   A1c   Cardiac enzymes     U/A Urinalysis Basic - ( 06 Feb 2025 20:19 )    Color: x / Appearance: x / SG: x / pH: x  Gluc: 116 mg/dL / Ketone: x  / Bili: x / Urobili: x   Blood: x / Protein: x / Nitrite: x   Leuk Esterase: x / RBC: x / WBC x   Sq Epi: x / Non Sq Epi: x / Bacteria: x      CSF  Immunological  Other    STUDIES & IMAGING: (EEG, CT, MR, U/S, TTE/BARBIE):  EEG CLASSIFICATION (2/7/2025):     Abnormal EEG in the awake, drowsy and asleep states.     -Three focal seizures, right frontocentral region, last at 04:59     -Generalized background slowing, mild (PDR < 8.5 Hz)       -----------------------------------------------------------------------------------------------------------     IMPRESSION/CLINICAL CORRELATE:     This is an abnormal EEG record.        Three recorded focal onset seizures from the right frontocentral reigon, consistent with a focal epilepsy.       Mild / diffuse muti-focal cerebral dysfunction, not specific in etiology.

## 2025-02-10 ENCOUNTER — TRANSCRIPTION ENCOUNTER (OUTPATIENT)
Age: 55
End: 2025-02-10

## 2025-02-10 ENCOUNTER — APPOINTMENT (OUTPATIENT)
Dept: HEART AND VASCULAR | Facility: CLINIC | Age: 55
End: 2025-02-10

## 2025-02-10 VITALS
RESPIRATION RATE: 18 BRPM | HEART RATE: 74 BPM | TEMPERATURE: 99 F | OXYGEN SATURATION: 99 % | DIASTOLIC BLOOD PRESSURE: 81 MMHG | SYSTOLIC BLOOD PRESSURE: 123 MMHG

## 2025-02-10 PROCEDURE — 83605 ASSAY OF LACTIC ACID: CPT

## 2025-02-10 PROCEDURE — A9585: CPT

## 2025-02-10 PROCEDURE — 85014 HEMATOCRIT: CPT

## 2025-02-10 PROCEDURE — 95700 EEG CONT REC W/VID EEG TECH: CPT

## 2025-02-10 PROCEDURE — 97161 PT EVAL LOW COMPLEX 20 MIN: CPT

## 2025-02-10 PROCEDURE — 70553 MRI BRAIN STEM W/O & W/DYE: CPT | Mod: MC

## 2025-02-10 PROCEDURE — 80235 DRUG ASSAY LACOSAMIDE: CPT

## 2025-02-10 PROCEDURE — 80164 ASSAY DIPROPYLACETIC ACD TOT: CPT

## 2025-02-10 PROCEDURE — 86901 BLOOD TYPING SEROLOGIC RH(D): CPT

## 2025-02-10 PROCEDURE — 99285 EMERGENCY DEPT VISIT HI MDM: CPT

## 2025-02-10 PROCEDURE — 84132 ASSAY OF SERUM POTASSIUM: CPT

## 2025-02-10 PROCEDURE — 85610 PROTHROMBIN TIME: CPT

## 2025-02-10 PROCEDURE — 70450 CT HEAD/BRAIN W/O DYE: CPT | Mod: MC

## 2025-02-10 PROCEDURE — C9254: CPT

## 2025-02-10 PROCEDURE — 84100 ASSAY OF PHOSPHORUS: CPT

## 2025-02-10 PROCEDURE — 95716 VEEG EA 12-26HR CONT MNTR: CPT

## 2025-02-10 PROCEDURE — 82947 ASSAY GLUCOSE BLOOD QUANT: CPT

## 2025-02-10 PROCEDURE — 82435 ASSAY OF BLOOD CHLORIDE: CPT

## 2025-02-10 PROCEDURE — 85730 THROMBOPLASTIN TIME PARTIAL: CPT

## 2025-02-10 PROCEDURE — 36415 COLL VENOUS BLD VENIPUNCTURE: CPT

## 2025-02-10 PROCEDURE — 80053 COMPREHEN METABOLIC PANEL: CPT

## 2025-02-10 PROCEDURE — 85025 COMPLETE CBC W/AUTO DIFF WBC: CPT

## 2025-02-10 PROCEDURE — 82803 BLOOD GASES ANY COMBINATION: CPT

## 2025-02-10 PROCEDURE — 84295 ASSAY OF SERUM SODIUM: CPT

## 2025-02-10 PROCEDURE — 86850 RBC ANTIBODY SCREEN: CPT

## 2025-02-10 PROCEDURE — 83735 ASSAY OF MAGNESIUM: CPT

## 2025-02-10 PROCEDURE — 96374 THER/PROPH/DIAG INJ IV PUSH: CPT

## 2025-02-10 PROCEDURE — 99232 SBSQ HOSP IP/OBS MODERATE 35: CPT

## 2025-02-10 PROCEDURE — 86900 BLOOD TYPING SEROLOGIC ABO: CPT

## 2025-02-10 PROCEDURE — 84484 ASSAY OF TROPONIN QUANT: CPT

## 2025-02-10 PROCEDURE — 85027 COMPLETE CBC AUTOMATED: CPT

## 2025-02-10 PROCEDURE — 97165 OT EVAL LOW COMPLEX 30 MIN: CPT

## 2025-02-10 PROCEDURE — 85018 HEMOGLOBIN: CPT

## 2025-02-10 PROCEDURE — 82330 ASSAY OF CALCIUM: CPT

## 2025-02-10 PROCEDURE — 76377 3D RENDER W/INTRP POSTPROCES: CPT

## 2025-02-10 RX ORDER — LACOSAMIDE 200 MG/1
300 TABLET, FILM COATED ORAL
Refills: 0 | Status: DISCONTINUED | OUTPATIENT
Start: 2025-02-10 | End: 2025-02-10

## 2025-02-10 RX ORDER — LACOSAMIDE 200 MG/1
150 TABLET, FILM COATED ORAL
Refills: 0 | Status: DISCONTINUED | OUTPATIENT
Start: 2025-02-10 | End: 2025-02-10

## 2025-02-10 RX ORDER — LACOSAMIDE 200 MG/1
150 TABLET, FILM COATED ORAL ONCE
Refills: 0 | Status: DISCONTINUED | OUTPATIENT
Start: 2025-02-10 | End: 2025-02-10

## 2025-02-10 RX ADMIN — BRIVARACETAM 100 MILLIGRAM(S): 50 TABLET, FILM COATED ORAL at 05:04

## 2025-02-10 RX ADMIN — LACOSAMIDE 150 MILLIGRAM(S): 200 TABLET, FILM COATED ORAL at 10:51

## 2025-02-10 RX ADMIN — ASPIRIN 81 MILLIGRAM(S): 81 TABLET, COATED ORAL at 11:27

## 2025-02-10 RX ADMIN — ENOXAPARIN SODIUM 40 MILLIGRAM(S): 100 INJECTION SUBCUTANEOUS at 11:27

## 2025-02-10 RX ADMIN — Medication 10 MILLIGRAM(S): at 05:04

## 2025-02-10 RX ADMIN — PANTOPRAZOLE 40 MILLIGRAM(S): 20 TABLET, DELAYED RELEASE ORAL at 05:04

## 2025-02-10 RX ADMIN — LACOSAMIDE 130 MILLIGRAM(S): 200 TABLET, FILM COATED ORAL at 05:07

## 2025-02-10 NOTE — PROGRESS NOTE ADULT - SUBJECTIVE AND OBJECTIVE BOX
Neurology Progress Note    SUBJECTIVE/OBJECTIVE/INTERVAL EVENTS: Patient seen and examined at bedside w/ neuro attending and team.     INTERVAL HISTORY: No acute event overnight.    REVIEW OF SYSTEMS: Few questions of a 10-system ROS was performed and is negative except for those items noted above and/or in the HPI.    MEDICATIONS  (STANDING):  amLODIPine   Tablet 10 milliGRAM(s) Oral daily  aspirin enteric coated 81 milliGRAM(s) Oral daily  atorvastatin 20 milliGRAM(s) Oral at bedtime  brivaracetam 100 milliGRAM(s) Oral <User Schedule>  enoxaparin Injectable 40 milliGRAM(s) SubCutaneous every 24 hours  hydrochlorothiazide 12.5 milliGRAM(s) Oral daily  lacosamide IVPB 150 milliGRAM(s) IV Intermittent <User Schedule>  pantoprazole    Tablet 40 milliGRAM(s) Oral before breakfast    MEDICATIONS  (PRN):  acetaminophen     Tablet .. 650 milliGRAM(s) Oral every 6 hours PRN Mild Pain (1 - 3), Moderate Pain (4 - 6)  lacosamide Injectable 200 milliGRAM(s) IV Push once PRN seizure  LORazepam   Injectable 2 milliGRAM(s) IV Push once PRN seizure        VITALS & EXAMINATION:  Vital Signs Last 24 Hrs  T(C): 36.9 (02-10-25 @ 04:37), Max: 36.9 (02-09-25 @ 09:24)  T(F): 98.4 (02-10-25 @ 04:37), Max: 98.5 (02-09-25 @ 09:24)  HR: 69 (02-10-25 @ 04:37) (63 - 83)  BP: 119/72 (02-10-25 @ 04:37) (119/72 - 135/83)  RR: 18 (02-10-25 @ 04:37) (18 - 18)  SpO2: 99% (02-10-25 @ 04:37) (96% - 99%)  Wt(kg): --  Patient On (Oxygen Delivery Method): room air    General:  Constitutional: Male, appears stated age, nontoxic, not in distress, sitting up in bed   Head: vEEG in place  Eyes: clear sclera;   Extremities: No cyanosis;   Resp: breathing comfortably on RA     Focused neurologic exam:  MS - AAO x3, speech fluent, rep/naming intact, follows commands, attn/conc/recent and remote memory WNL  CN - PERRLA, EOMI, VFF, face sens/str/hearing WNL b/l, tongue/palate midline, trap 5/5 b/l  Motor - Normal bulk/tone, 5/5 all  Sens - LT/temp/vib intact all  DTR's - 2+ all and downgoing b/l plantar response  Coord - FtN intact b/l  Gait: Deferred - vEEG in place        LABS:  cret                        10.4   6.55  )-----------( 284      ( 09 Feb 2025 06:53 )             33.5     02-09    140  |  101  |  23  ----------------------------<  107[H]  4.0   |  21[L]  |  1.07    Ca    9.3      09 Feb 2025 06:51    TPro  6.6  /  Alb  3.9  /  TBili  0.3  /  DBili  x   /  AST  51[H]  /  ALT  28  /  AlkPhos  93  02-09        LFTs LIVER FUNCTIONS - ( 06 Feb 2025 20:19 )  Alb: 4.1 g/dL / Pro: 6.6 g/dL / ALK PHOS: 96 U/L / ALT: 33 U/L / AST: 72 U/L / GGT: x           Coagulopathy PT/INR - ( 06 Feb 2025 20:19 )   PT: 11.0 sec;   INR: 0.96 ratio         PTT - ( 06 Feb 2025 20:19 )  PTT:31.6 sec  Lipid Panel   A1c   Cardiac enzymes     U/A Urinalysis Basic - ( 06 Feb 2025 20:19 )    Color: x / Appearance: x / SG: x / pH: x  Gluc: 116 mg/dL / Ketone: x  / Bili: x / Urobili: x   Blood: x / Protein: x / Nitrite: x   Leuk Esterase: x / RBC: x / WBC x   Sq Epi: x / Non Sq Epi: x / Bacteria: x      STUDIES & IMAGING:     EEG  DAY 3  START: 2/8/2025  08:00 AM     END: 	2/9/2025  08:00 AM  DURATION (HRS): ?24 hr    EEG CLASSIFICATION:   Abnormal EEG in the awake, drowsy and asleep states.   -No seizures in this recording.  -Generalized background slowing, mild (PDR < 8.5 Hz)   -----------------------------------------------------------------------------------------------------------   IMPRESSION/CLINICAL CORRELATE:   This is an abnormal EEG record.    No seizures recorded.  Mild / diffuse muti-focal cerebral dysfunction, not specific in etiology.      EEG -- DAY 2	 START: 2/7/2025  08:00 AM     END: 	2/8/2025  08:00 AM   DURATION (HRS): ?24 hr  -----------------------------------------------------------------------------------------------------------   EEG CLASSIFICATION:   Abnormal EEG in the awake, drowsy and asleep states.   -Two focal seizures, right frontocentral region, last at 04:59   -Generalized background slowing, mild (PDR < 8.5 Hz)   -----------------------------------------------------------------------------------------------------------   IMPRESSION/CLINICAL CORRELATE:   This is an abnormal EEG record.    Two recorded focal onset seizures from the right frontocentral reigon, consistent with a focal epilepsy.   Mild / diffuse muti-focal cerebral dysfunction, not specific in etiology.       EEG CLASSIFICATION (2/7/2025):   Abnormal EEG in the awake, drowsy and asleep states.   -Three focal seizures, right frontocentral region, last at 04:59   -Generalized background slowing, mild (PDR < 8.5 Hz)     IMPRESSION/CLINICAL CORRELATE:   This is an abnormal EEG record.        Three recorded focal onset seizures from the right frontocentral reigon, consistent with a focal epilepsy.       Mild / diffuse muti-focal cerebral dysfunction, not specific in etiology.    Neurology Progress Note    SUBJECTIVE/OBJECTIVE/INTERVAL EVENTS: Patient seen and examined at bedside w/ neuro attending and team.     INTERVAL HISTORY: No acute event overnight.    REVIEW OF SYSTEMS: Few questions of a 10-system ROS was performed and is negative except for those items noted above and/or in the HPI.    MEDICATIONS  (STANDING):  amLODIPine   Tablet 10 milliGRAM(s) Oral daily  aspirin enteric coated 81 milliGRAM(s) Oral daily  atorvastatin 20 milliGRAM(s) Oral at bedtime  brivaracetam 100 milliGRAM(s) Oral <User Schedule>  enoxaparin Injectable 40 milliGRAM(s) SubCutaneous every 24 hours  hydrochlorothiazide 12.5 milliGRAM(s) Oral daily  lacosamide IVPB 150 milliGRAM(s) IV Intermittent <User Schedule>  pantoprazole    Tablet 40 milliGRAM(s) Oral before breakfast    MEDICATIONS  (PRN):  acetaminophen     Tablet .. 650 milliGRAM(s) Oral every 6 hours PRN Mild Pain (1 - 3), Moderate Pain (4 - 6)  lacosamide Injectable 200 milliGRAM(s) IV Push once PRN seizure  LORazepam   Injectable 2 milliGRAM(s) IV Push once PRN seizure        VITALS & EXAMINATION:  Vital Signs Last 24 Hrs  T(C): 36.9 (02-10-25 @ 04:37), Max: 36.9 (02-09-25 @ 09:24)  T(F): 98.4 (02-10-25 @ 04:37), Max: 98.5 (02-09-25 @ 09:24)  HR: 69 (02-10-25 @ 04:37) (63 - 83)  BP: 119/72 (02-10-25 @ 04:37) (119/72 - 135/83)  RR: 18 (02-10-25 @ 04:37) (18 - 18)  SpO2: 99% (02-10-25 @ 04:37) (96% - 99%)  Wt(kg): --  Patient On (Oxygen Delivery Method): room air    General:  Constitutional: Male, appears stated age, nontoxic, not in distress, sitting up in bed   Head: vEEG in place  Eyes: clear sclera;   Extremities: No cyanosis;   Resp: breathing comfortably on RA     Focused neurologic exam:  MS - AAO x3, speech fluent, rep/naming intact, follows commands, attn/conc/recent and remote memory WNL  CN - PERRLA, EOMI, VFF, face sens/str/hearing WNL b/l, tongue/palate midline, trap 5/5 b/l  Motor - Normal bulk/tone, 5/5 all  Sens - LT/temp/vib intact all  DTR's - 2+ all and downgoing b/l plantar response  Coord - FtN intact b/l  Gait: Deferred - vEEG in place        LABS:  cret                        10.4   6.55  )-----------( 284      ( 09 Feb 2025 06:53 )             33.5     02-09    140  |  101  |  23  ----------------------------<  107[H]  4.0   |  21[L]  |  1.07    Ca    9.3      09 Feb 2025 06:51    TPro  6.6  /  Alb  3.9  /  TBili  0.3  /  DBili  x   /  AST  51[H]  /  ALT  28  /  AlkPhos  93  02-09        LFTs LIVER FUNCTIONS - ( 06 Feb 2025 20:19 )  Alb: 4.1 g/dL / Pro: 6.6 g/dL / ALK PHOS: 96 U/L / ALT: 33 U/L / AST: 72 U/L / GGT: x           Coagulopathy PT/INR - ( 06 Feb 2025 20:19 )   PT: 11.0 sec;   INR: 0.96 ratio         PTT - ( 06 Feb 2025 20:19 )  PTT:31.6 sec  Lipid Panel   A1c   Cardiac enzymes     U/A Urinalysis Basic - ( 06 Feb 2025 20:19 )    Color: x / Appearance: x / SG: x / pH: x  Gluc: 116 mg/dL / Ketone: x  / Bili: x / Urobili: x   Blood: x / Protein: x / Nitrite: x   Leuk Esterase: x / RBC: x / WBC x   Sq Epi: x / Non Sq Epi: x / Bacteria: x      STUDIES & IMAGING:     EEG  DAY 3  START: 2/8/2025  08:00 AM     END: 	2/9/2025  08:00 AM  DURATION (HRS): ?24 hr    EEG CLASSIFICATION:   Abnormal EEG in the awake, drowsy and asleep states.   -No seizures in this recording.  -Generalized background slowing, mild (PDR < 8.5 Hz)   -----------------------------------------------------------------------------------------------------------   IMPRESSION/CLINICAL CORRELATE:   This is an abnormal EEG record.    No seizures recorded.  Mild / diffuse muti-focal cerebral dysfunction, not specific in etiology.      EEG -- DAY 2	 START: 2/7/2025  08:00 AM     END: 	2/8/2025  08:00 AM   DURATION (HRS): ?24 hr  -----------------------------------------------------------------------------------------------------------   EEG CLASSIFICATION:   Abnormal EEG in the awake, drowsy and asleep states.   -Two focal seizures, right frontocentral region, last at 04:59   -Generalized background slowing, mild (PDR < 8.5 Hz)   -----------------------------------------------------------------------------------------------------------   IMPRESSION/CLINICAL CORRELATE:   This is an abnormal EEG record.    Two recorded focal onset seizures from the right frontocentral reigon, consistent with a focal epilepsy.   Mild / diffuse muti-focal cerebral dysfunction, not specific in etiology.       EEG CLASSIFICATION (2/7/2025):   Abnormal EEG in the awake, drowsy and asleep states.   -Three focal seizures, right frontocentral region, last at 04:59   -Generalized background slowing, mild (PDR < 8.5 Hz)     IMPRESSION/CLINICAL CORRELATE:   This is an abnormal EEG record.    Three recorded focal onset seizures from the right frontocentral reigon, consistent with a focal epilepsy.   Mild / diffuse muti-focal cerebral dysfunction, not specific in etiology.

## 2025-02-10 NOTE — DISCHARGE NOTE NURSING/CASE MANAGEMENT/SOCIAL WORK - FINANCIAL ASSISTANCE
[facility] provides services at a reduced cost to those who are determined to be eligible through [facility]’s financial assistance program. Information regarding [facility]’s financial assistance program can be found by going to [email or web address] or by calling [phone].

## 2025-02-10 NOTE — DISCHARGE NOTE NURSING/CASE MANAGEMENT/SOCIAL WORK - PATIENT PORTAL LINK FT
You can access the FollowMyHealth Patient Portal offered by [facility] by registering at the following website: [email or web address]. By joining [employer]’s FollowMyHealth portal, you will also be able to view your health information using other applications (apps) compatible with our system.

## 2025-02-10 NOTE — PROGRESS NOTE ADULT - ASSESSMENT
55M PMHx HTN, HLD, CAD s/p CABG. DVTs, aortic dissection s/p bypass graft, hemorrhagic pancreatitis, epilepsy who presented due to 4 seizure episodes on 2/6/2025. Episodes consistent of staring spells, drooling, abnormal eye movements, lasting 2 minutes and resolving spontaneously. Took clonazepam prior to coming to the hospital. Had an additional episode in the ED witnessed by staff, received lorazepam 2 mg. Patient endorses medication compliance, however wife is not sure if he remembers to take the medications every day. Follows with NP Delilah Patel at [facility]. Last EEG 10/2024 with occasional R temporal spikes. In ED, vital signs were stable. Labs with chronic anemia, non elevated lactate, otherwise unremarkable. Exam limited due to patient unwilling to participate and drowsy after lorazepam. No evidence of focal deficits noted.    Impression: Intractable epilepsy (CORTES seizures) by description. Possible medication nonadherence.    Plan:  [x] Stop vEEG  [x] Lacosamide, carbamazepine levels pending  [x] Continue brivaracetam 100mg PO q12h (sent to [facility]), lacosamide 150mg IV q8h (sent to [facility] as 300mg BID)  [] MRI brain w/wo (epilepsy protocol) completed, pending result  [x] Vitals/neurochecks q4h  [x] Seizure/fall/aspiration precautions    DVT prophylaxis: enoxaparin 40mg q24h  Diet: Regular  Dispo: PT/OT - both no needs, likely d/c on Monday, 2/10 (tomorrow); needs outpatient f/u with epileptologist    seizure precaution:  [] Given concern for seizure, advised the patient with regards to risks and driving privileges associated with the [place] Guidelines. Advised patient regarding the risk of seizures and general seizure safety recommendations including no driving, not to be bathing alone, climbing to high places and operating heavy machinery, until cleared by follow-up outpatient Neurology. Reinforced the importance of compliance with medications. Discussed sleep hygiene and the risks of sleep disruption. Discussed the risk of death associated with seizures / SUDEP.       55M PMHx HTN, HLD, CAD s/p CABG. DVTs, aortic dissection s/p bypass graft, hemorrhagic pancreatitis, epilepsy who presented due to 4 seizure episodes on 2/6/2025. Episodes consistent of staring spells, drooling, abnormal eye movements, lasting 2 minutes and resolving spontaneously. Took clonazepam prior to coming to the hospital. Had an additional episode in the ED witnessed by staff, received lorazepam 2 mg. Patient endorses medication compliance, however wife is not sure if he remembers to take the medications every day. [facility] Last EEG 10/2024 with occasional R temporal spikes. In ED, vital signs were stable. Labs with chronic anemia, non elevated lactate, otherwise unremarkable. Exam limited due to patient unwilling to participate and drowsy after lorazepam. No evidence of focal deficits noted.    Impression: Intractable epilepsy (CORTES seizures) by description. Possible medication nonadherence.    Plan:  [x] Stop vEEG  [x] Lacosamide, carbamazepine levels pending  [x] Continue brivaracetam 100mg PO q12h (sent to [facility]), increased lacosamide to 300mg PO BID (sent to [facility] as 300mg BID)  [] MRI brain w/wo (epilepsy protocol) completed, pending result  [x] Vitals/neurochecks q4h  [x] Seizure/fall/aspiration precautions  [x] flu/covid swab: negative.  cxray: wnl    DVT prophylaxis: enoxaparin 40mg q24h  Diet: Regular  Dispo: PT/OT - both no needs, likely d/c on Monday, 2/10 (tomorrow); needs outpatient f/u with epileptologist    seizure precaution:  [] Given concern for seizure, advised the patient with regards to risks and driving privileges associated with the [place] Guidelines. Advised patient regarding the risk of seizures and general seizure safety recommendations including no driving, not to be bathing alone, climbing to high places and operating heavy machinery, until cleared by follow-up outpatient Neurology. Reinforced the importance of compliance with medications. Discussed sleep hygiene and the risks of sleep disruption. Discussed the risk of death associated with seizures / SUDEP.    Patient seen and plans discussed with Dr. Lee

## 2025-02-14 ENCOUNTER — APPOINTMENT (OUTPATIENT)
Dept: FAMILY MEDICINE | Facility: CLINIC | Age: 55
End: 2025-02-14

## 2025-02-14 LAB — LACOSAMIDE (VIMPAT) RESULT: 8.8 UG/ML — SIGNIFICANT CHANGE UP (ref 5–10)

## 2025-02-19 ENCOUNTER — NON-APPOINTMENT (OUTPATIENT)
Age: 55
End: 2025-02-19

## 2025-02-19 ENCOUNTER — APPOINTMENT (OUTPATIENT)
Dept: HEART AND VASCULAR | Facility: CLINIC | Age: 55
End: 2025-02-19
Payer: MEDICARE

## 2025-02-19 VITALS
DIASTOLIC BLOOD PRESSURE: 80 MMHG | HEART RATE: 60 BPM | TEMPERATURE: 98.2 F | BODY MASS INDEX: 23.7 KG/M2 | WEIGHT: 175 LBS | SYSTOLIC BLOOD PRESSURE: 137 MMHG | OXYGEN SATURATION: 98 % | HEIGHT: 72 IN

## 2025-02-19 DIAGNOSIS — Z86.79 OTHER SPECIFIED POSTPROCEDURAL STATES: ICD-10-CM

## 2025-02-19 DIAGNOSIS — I10 ESSENTIAL (PRIMARY) HYPERTENSION: ICD-10-CM

## 2025-02-19 DIAGNOSIS — Z98.890 OTHER SPECIFIED POSTPROCEDURAL STATES: ICD-10-CM

## 2025-02-19 DIAGNOSIS — Z95.1 PRESENCE OF AORTOCORONARY BYPASS GRAFT: ICD-10-CM

## 2025-02-19 PROCEDURE — 93000 ELECTROCARDIOGRAM COMPLETE: CPT

## 2025-02-19 PROCEDURE — 99214 OFFICE O/P EST MOD 30 MIN: CPT

## 2025-02-19 PROCEDURE — G2211 COMPLEX E/M VISIT ADD ON: CPT

## 2025-02-20 NOTE — PROGRESS NOTE ADULT - PROVIDER SPECIALTY LIST ADULT
Brian Cantrell presents to Urgent Care Patient arriving: alone with complaint of productive cough, tired, mild fever, pain in chest with deep breath  Onset 2 months ago, but worsening over the past couple days.  Pt tried some Tylenol - with fever relief.  Patient masked: Yes  Clinical masked: Yes    Can leave detailed message on mobile phone:    Mobile [phone]      Infectious Disease

## 2025-02-21 ENCOUNTER — NON-APPOINTMENT (OUTPATIENT)
Age: 55
End: 2025-02-21

## 2025-02-21 ENCOUNTER — APPOINTMENT (OUTPATIENT)
Dept: NEUROLOGY | Facility: CLINIC | Age: 55
End: 2025-02-21
Payer: MEDICARE

## 2025-02-21 VITALS
WEIGHT: 175 LBS | HEIGHT: 72 IN | HEART RATE: 71 BPM | BODY MASS INDEX: 23.7 KG/M2 | SYSTOLIC BLOOD PRESSURE: 111 MMHG | DIASTOLIC BLOOD PRESSURE: 69 MMHG

## 2025-02-21 DIAGNOSIS — G47.9 SLEEP DISORDER, UNSPECIFIED: ICD-10-CM

## 2025-02-21 DIAGNOSIS — G40.109 LOCALIZATION-RELATED (FOCAL) (PARTIAL) SYMPTOMATIC EPILEPSY AND EPILEPTIC SYNDROMES WITH SIMPLE PARTIAL SEIZURES, NOT INTRACTABLE, W/OUT STATUS EPILEPTICUS: ICD-10-CM

## 2025-02-21 PROCEDURE — 99203 OFFICE O/P NEW LOW 30 MIN: CPT

## 2025-02-21 RX ORDER — LACOSAMIDE 150 MG/1
150 TABLET, FILM COATED ORAL
Qty: 120 | Refills: 1 | Status: ACTIVE | COMMUNITY
Start: 2025-02-21 | End: 1900-01-01

## 2025-02-21 RX ORDER — BRIVARACETAM 100 MG/1
100 TABLET, FILM COATED ORAL
Qty: 60 | Refills: 3 | Status: ACTIVE | COMMUNITY
Start: 2025-02-21 | End: 1900-01-01

## 2025-02-21 RX ORDER — LACOSAMIDE 150 MG/1
150 TABLET ORAL
Qty: 120 | Refills: 1 | Status: DISCONTINUED | COMMUNITY
Start: 2025-02-21 | End: 2025-02-21

## 2025-02-21 RX ORDER — CLONAZEPAM 0.5 MG/1
0.5 TABLET, ORALLY DISINTEGRATING ORAL
Qty: 20 | Refills: 0 | Status: ACTIVE | COMMUNITY
Start: 2025-02-21 | End: 1900-01-01

## 2025-02-28 ENCOUNTER — APPOINTMENT (OUTPATIENT)
Dept: NEUROLOGY | Facility: CLINIC | Age: 55
End: 2025-02-28

## 2025-02-28 VITALS
BODY MASS INDEX: 23.7 KG/M2 | SYSTOLIC BLOOD PRESSURE: 104 MMHG | DIASTOLIC BLOOD PRESSURE: 66 MMHG | HEIGHT: 72 IN | HEART RATE: 76 BPM | WEIGHT: 175 LBS

## 2025-02-28 PROCEDURE — 99214 OFFICE O/P EST MOD 30 MIN: CPT

## 2025-03-06 ENCOUNTER — TRANSCRIPTION ENCOUNTER (OUTPATIENT)
Age: 55
End: 2025-03-06

## 2025-03-11 PROBLEM — G40.119 TEMPORAL LOBE EPILEPSY, INTRACTABLE: Status: ACTIVE | Noted: 2025-03-10

## 2025-03-13 NOTE — ED ADULT NURSE NOTE - IN ACCORDANCE WITH [place] LAW, WE OFFER EVERY PATIENT A HEPATITIS C TEST. WOULD YOU LIKE TO BE TESTED TODAY?
Neck x-ray is normal except you have some trace vertebrae slipping at C3 on C4. You also have moderate disc height loss at C5-6 with mild to moderate arthritis and multilevel bone spurring. Where would you like to go for neurosurgery? Let me know. Opt out Yes

## 2025-03-14 ENCOUNTER — NON-APPOINTMENT (OUTPATIENT)
Age: 55
End: 2025-03-14

## 2025-03-14 ENCOUNTER — APPOINTMENT (OUTPATIENT)
Dept: NEUROSURGERY | Facility: CLINIC | Age: 55
End: 2025-03-14
Payer: MEDICARE

## 2025-03-14 VITALS — HEART RATE: 76 BPM | DIASTOLIC BLOOD PRESSURE: 68 MMHG | OXYGEN SATURATION: 97 % | SYSTOLIC BLOOD PRESSURE: 106 MMHG

## 2025-03-14 DIAGNOSIS — G40.119 LOCALIZATION-RELATED (FOCAL) (PARTIAL) SYMPTOMATIC EPILEPSY AND EPILEPTIC SYNDROMES WITH SIMPLE PARTIAL SEIZURES, INTRACTABLE, W/OUT STATUS EPILEPTICUS: ICD-10-CM

## 2025-03-14 PROCEDURE — 99204 OFFICE O/P NEW MOD 45 MIN: CPT | Mod: 57

## 2025-03-20 NOTE — PROGRESS NOTE ADULT - SUBJECTIVE AND OBJECTIVE BOX
SUBJECTIVE: Patient seen and examined bedside by chief resident. Patient in bed intubated and sedated. Appears comfortable. No meaningful interaction.      aspirin  chewable 81 milliGRAM(s) Oral daily  caspofungin IVPB 50 milliGRAM(s) IV Intermittent every 24 hours  caspofungin IVPB      heparin   Injectable 5000 Unit(s) SubCutaneous every 8 hours  meropenem  IVPB 1000 milliGRAM(s) IV Intermittent every 8 hours  norepinephrine Infusion 0.05 MICROgram(s)/kG/Min IV Continuous <Continuous>      Vital Signs Last 24 Hrs  T(C): 38.2 (29 May 2023 05:52), Max: 38.2 (29 May 2023 05:52)  T(F): 100.8 (29 May 2023 05:52), Max: 100.8 (29 May 2023 05:52)  HR: 122 (29 May 2023 06:00) (80 - 125)  BP: --  BP(mean): --  RR: 29 (29 May 2023 06:00) (18 - 34)  SpO2: 96% (29 May 2023 06:00) (92% - 100%)    Parameters below as of 29 May 2023 06:00  Patient On (Oxygen Delivery Method): ventilator    O2 Concentration (%): 40  I&O's Detail    28 May 2023 07:01  -  29 May 2023 07:00  --------------------------------------------------------  IN:    Dexmedetomidine: 349.3 mL    Enteral Tube Flush: 60 mL    Fat Emulsion (Fish Oil &amp; Plant Based) 20% Infusion: 265.2 mL    FentaNYL: 3.5 mL    IV PiggyBack: 250 mL    IV PiggyBack: 100 mL    IV PiggyBack: 125 mL    IV PiggyBack: 150 mL    Nepro with Carb Steady: 120 mL    Norepinephrine: 109.1 mL    PRBCs (Packed Red Blood Cells): 600 mL    TPN (Total Parenteral Nutrition): 1680 mL  Total IN: 3812.1 mL    OUT:    Bulb (mL): 35 mL    Bulb (mL): 75 mL    Drain (mL): 0 mL    Drain (mL): 45 mL    Drain (mL): 395 mL    Drain (mL): 100 mL    Drain (mL): 40 mL    Fat Emulsion (Fish Oil &amp; Plant Based) 20% Infusion: 0 mL    Incontinent per Condom Catheter (mL): 15 mL    Intermittent Catheterization - Urethral (mL): 50 mL    Other (mL): 2986 mL  Total OUT: 3741 mL    Total NET: 71.1 mL          Physical Exam  Constitutional: Intubated/Sedated on Precedex RASS -5  Pulm: intubated, equal chest rise, no respiratory distress  CV: Regular rate and rhythm  Abd:  soft, moderately distended. Ascites drain - brown/red, thick; hematoma drain - red/black; pancreatic drain - red/black;  Extremities: no edema  Drains: Cantrell    LABS:                        9.6    16.47 )-----------( 72       ( 29 May 2023 05:30 )             27.7     05-29    134<L>  |  103  |  29<H>  ----------------------------<  139<H>  4.3   |  23  |  0.75    Ca    8.0<L>      29 May 2023 05:30  Phos  4.1     05-29  Mg     2.3     05-29    TPro  5.6<L>  /  Alb  2.2<L>  /  TBili  2.8<H>  /  DBili  2.0<H>  /  AST  28  /  ALT  13  /  AlkPhos  347<H>  05-29    PT/INR - ( 29 May 2023 05:30 )   PT: 15.1 sec;   INR: 1.27          PTT - ( 29 May 2023 05:30 )  PTT:35.8 sec      RADIOLOGY & ADDITIONAL STUDIES:   194

## 2025-03-24 ENCOUNTER — APPOINTMENT (OUTPATIENT)
Dept: NEUROLOGY | Facility: CLINIC | Age: 55
End: 2025-03-24
Payer: MEDICARE

## 2025-03-24 PROCEDURE — 96138 PSYCL/NRPSYC TECH 1ST: CPT | Mod: NC

## 2025-03-24 PROCEDURE — 96133 NRPSYC TST EVAL PHYS/QHP EA: CPT

## 2025-03-24 PROCEDURE — 96139 PSYCL/NRPSYC TST TECH EA: CPT | Mod: NC

## 2025-03-24 PROCEDURE — 96116 NUBHVL XM PHYS/QHP 1ST HR: CPT

## 2025-03-24 PROCEDURE — 96132 NRPSYC TST EVAL PHYS/QHP 1ST: CPT

## 2025-03-26 ENCOUNTER — APPOINTMENT (OUTPATIENT)
Dept: HEART AND VASCULAR | Facility: CLINIC | Age: 55
End: 2025-03-26
Payer: MEDICARE

## 2025-03-26 VITALS
HEART RATE: 64 BPM | SYSTOLIC BLOOD PRESSURE: 112 MMHG | WEIGHT: 172.5 LBS | TEMPERATURE: 98.2 F | OXYGEN SATURATION: 97 % | DIASTOLIC BLOOD PRESSURE: 68 MMHG | BODY MASS INDEX: 23.36 KG/M2 | HEIGHT: 72 IN

## 2025-03-26 DIAGNOSIS — Z01.810 ENCOUNTER FOR PREPROCEDURAL CARDIOVASCULAR EXAMINATION: ICD-10-CM

## 2025-03-26 PROCEDURE — 99214 OFFICE O/P EST MOD 30 MIN: CPT

## 2025-03-26 PROCEDURE — G2211 COMPLEX E/M VISIT ADD ON: CPT

## 2025-03-27 ENCOUNTER — APPOINTMENT (OUTPATIENT)
Dept: NUCLEAR MEDICINE | Facility: CLINIC | Age: 55
End: 2025-03-27

## 2025-03-31 ENCOUNTER — APPOINTMENT (OUTPATIENT)
Dept: NEUROLOGY | Facility: CLINIC | Age: 55
End: 2025-03-31

## 2025-04-03 ENCOUNTER — RX RENEWAL (OUTPATIENT)
Age: 55
End: 2025-04-03

## 2025-04-07 ENCOUNTER — APPOINTMENT (OUTPATIENT)
Dept: NEUROLOGY | Facility: CLINIC | Age: 55
End: 2025-04-07
Payer: MEDICARE

## 2025-04-07 VITALS
DIASTOLIC BLOOD PRESSURE: 71 MMHG | HEIGHT: 72 IN | HEART RATE: 66 BPM | BODY MASS INDEX: 22.62 KG/M2 | WEIGHT: 167 LBS | SYSTOLIC BLOOD PRESSURE: 127 MMHG

## 2025-04-07 DIAGNOSIS — G40.219 LOCALIZATION-RELATED (FOCAL) (PARTIAL) SYMPTOMATIC EPILEPSY AND EPILEPTIC SYNDROMES WITH COMPLEX PARTIAL SEIZURES, INTRACTABLE, W/OUT STATUS EPILEPTICUS: ICD-10-CM

## 2025-04-07 DIAGNOSIS — G40.109 LOCALIZATION-RELATED (FOCAL) (PARTIAL) SYMPTOMATIC EPILEPSY AND EPILEPTIC SYNDROMES WITH SIMPLE PARTIAL SEIZURES, NOT INTRACTABLE, W/OUT STATUS EPILEPTICUS: ICD-10-CM

## 2025-04-07 PROCEDURE — 99214 OFFICE O/P EST MOD 30 MIN: CPT

## 2025-04-09 ENCOUNTER — APPOINTMENT (OUTPATIENT)
Dept: HEART AND VASCULAR | Facility: CLINIC | Age: 55
End: 2025-04-09
Payer: MEDICARE

## 2025-04-09 ENCOUNTER — NON-APPOINTMENT (OUTPATIENT)
Age: 55
End: 2025-04-09

## 2025-04-09 ENCOUNTER — APPOINTMENT (OUTPATIENT)
Dept: MRI IMAGING | Facility: HOSPITAL | Age: 55
End: 2025-04-09

## 2025-04-09 VITALS
WEIGHT: 171 LBS | OXYGEN SATURATION: 100 % | TEMPERATURE: 98.1 F | HEART RATE: 62 BPM | DIASTOLIC BLOOD PRESSURE: 66 MMHG | BODY MASS INDEX: 23.16 KG/M2 | SYSTOLIC BLOOD PRESSURE: 111 MMHG | HEIGHT: 72 IN

## 2025-04-09 DIAGNOSIS — I10 ESSENTIAL (PRIMARY) HYPERTENSION: ICD-10-CM

## 2025-04-09 DIAGNOSIS — Z95.1 PRESENCE OF AORTOCORONARY BYPASS GRAFT: ICD-10-CM

## 2025-04-09 DIAGNOSIS — Z95.2 PRESENCE OF PROSTHETIC HEART VALVE: ICD-10-CM

## 2025-04-09 PROCEDURE — 99214 OFFICE O/P EST MOD 30 MIN: CPT

## 2025-04-09 PROCEDURE — 93000 ELECTROCARDIOGRAM COMPLETE: CPT

## 2025-04-09 PROCEDURE — G2211 COMPLEX E/M VISIT ADD ON: CPT

## 2025-04-11 ENCOUNTER — OUTPATIENT (OUTPATIENT)
Dept: OUTPATIENT SERVICES | Facility: HOSPITAL | Age: 55
LOS: 1 days | End: 2025-04-11
Payer: MEDICARE

## 2025-04-11 VITALS
OXYGEN SATURATION: 98 % | DIASTOLIC BLOOD PRESSURE: 73 MMHG | SYSTOLIC BLOOD PRESSURE: 121 MMHG | RESPIRATION RATE: 16 BRPM | HEIGHT: 72 IN | TEMPERATURE: 99 F | WEIGHT: 166.89 LBS | HEART RATE: 65 BPM

## 2025-04-11 DIAGNOSIS — Z98.890 OTHER SPECIFIED POSTPROCEDURAL STATES: Chronic | ICD-10-CM

## 2025-04-11 DIAGNOSIS — Z29.9 ENCOUNTER FOR PROPHYLACTIC MEASURES, UNSPECIFIED: ICD-10-CM

## 2025-04-11 DIAGNOSIS — G40.119 LOCALIZATION-RELATED (FOCAL) (PARTIAL) SYMPTOMATIC EPILEPSY AND EPILEPTIC SYNDROMES WITH SIMPLE PARTIAL SEIZURES, INTRACTABLE, WITHOUT STATUS EPILEPTICUS: ICD-10-CM

## 2025-04-11 DIAGNOSIS — Z01.818 ENCOUNTER FOR OTHER PREPROCEDURAL EXAMINATION: ICD-10-CM

## 2025-04-11 DIAGNOSIS — Z95.1 PRESENCE OF AORTOCORONARY BYPASS GRAFT: Chronic | ICD-10-CM

## 2025-04-11 DIAGNOSIS — I10 ESSENTIAL (PRIMARY) HYPERTENSION: ICD-10-CM

## 2025-04-11 LAB
ALBUMIN SERPL ELPH-MCNC: 4.1 G/DL — SIGNIFICANT CHANGE UP (ref 3.3–5)
ALP SERPL-CCNC: 92 U/L — SIGNIFICANT CHANGE UP (ref 40–120)
ALT FLD-CCNC: 23 U/L — SIGNIFICANT CHANGE UP (ref 10–45)
ANION GAP SERPL CALC-SCNC: 13 MMOL/L — SIGNIFICANT CHANGE UP (ref 5–17)
AST SERPL-CCNC: 49 U/L — HIGH (ref 10–40)
BILIRUB SERPL-MCNC: 0.6 MG/DL — SIGNIFICANT CHANGE UP (ref 0.2–1.2)
BLD GP AB SCN SERPL QL: NEGATIVE — SIGNIFICANT CHANGE UP
BUN SERPL-MCNC: 18 MG/DL — SIGNIFICANT CHANGE UP (ref 7–23)
CALCIUM SERPL-MCNC: 9.4 MG/DL — SIGNIFICANT CHANGE UP (ref 8.4–10.5)
CHLORIDE SERPL-SCNC: 103 MMOL/L — SIGNIFICANT CHANGE UP (ref 96–108)
CO2 SERPL-SCNC: 24 MMOL/L — SIGNIFICANT CHANGE UP (ref 22–31)
CREAT SERPL-MCNC: 1.02 MG/DL — SIGNIFICANT CHANGE UP (ref 0.5–1.3)
EGFR: 86 ML/MIN/1.73M2 — SIGNIFICANT CHANGE UP
EGFR: 86 ML/MIN/1.73M2 — SIGNIFICANT CHANGE UP
GLUCOSE SERPL-MCNC: 108 MG/DL — HIGH (ref 70–99)
HCT VFR BLD CALC: 29.5 % — LOW (ref 39–50)
HGB BLD-MCNC: 9.2 G/DL — LOW (ref 13–17)
MCHC RBC-ENTMCNC: 28.8 PG — SIGNIFICANT CHANGE UP (ref 27–34)
MCHC RBC-ENTMCNC: 31.2 G/DL — LOW (ref 32–36)
MCV RBC AUTO: 92.2 FL — SIGNIFICANT CHANGE UP (ref 80–100)
MRSA PCR RESULT.: SIGNIFICANT CHANGE UP
NRBC BLD AUTO-RTO: 0 /100 WBCS — SIGNIFICANT CHANGE UP (ref 0–0)
PLATELET # BLD AUTO: 244 K/UL — SIGNIFICANT CHANGE UP (ref 150–400)
POTASSIUM SERPL-MCNC: 4.2 MMOL/L — SIGNIFICANT CHANGE UP (ref 3.5–5.3)
POTASSIUM SERPL-SCNC: 4.2 MMOL/L — SIGNIFICANT CHANGE UP (ref 3.5–5.3)
PROT SERPL-MCNC: 6.4 G/DL — SIGNIFICANT CHANGE UP (ref 6–8.3)
RBC # BLD: 3.2 M/UL — LOW (ref 4.2–5.8)
RBC # FLD: 21.8 % — HIGH (ref 10.3–14.5)
RH IG SCN BLD-IMP: POSITIVE — SIGNIFICANT CHANGE UP
S AUREUS DNA NOSE QL NAA+PROBE: SIGNIFICANT CHANGE UP
SODIUM SERPL-SCNC: 140 MMOL/L — SIGNIFICANT CHANGE UP (ref 135–145)
WBC # BLD: 6.82 K/UL — SIGNIFICANT CHANGE UP (ref 3.8–10.5)
WBC # FLD AUTO: 6.82 K/UL — SIGNIFICANT CHANGE UP (ref 3.8–10.5)

## 2025-04-11 NOTE — H&P PST ADULT - ASSESSMENT
DASI score:  DASI activity:  Loose teeth or denture:   MP:     CAPRINI SCORE    AGE RELATED RISK FACTORS                                                             [ ] Age 41-60 years                                            (1 Point)  [ ] Age: 61-74 years                                           (2 Points)                 [ ] Age= 75 years                                                (3 Points)             DISEASE RELATED RISK FACTORS                                                       [ ] Edema in the lower extremities                 (1 Point)                     [ ] Varicose veins                                               (1 Point)                                 [ ] BMI > 25 Kg/m2                                            (1 Point)                                  [ ] Serious infection (ie PNA)                            (1 Point)                     [ ] Lung disease ( COPD, Emphysema)            (1 Point)                                                                          [ ] Acute myocardial infarction                         (1 Point)                  [ ] Congestive heart failure (in the previous month)  (1 Point)         [ ] Inflammatory bowel disease                            (1 Point)                  [ ] Central venous access, PICC or Port               (2 points)       (within the last month)                                                                [ ] Stroke (in the previous month)                        (5 Points)    [ ] Previous or present malignancy                       (2 points)                                                                                                                                                         HEMATOLOGY RELATED FACTORS                                                         [ ] Prior episodes of VTE                                     (3 Points)                     [ ] Positive family history for VTE                      (3 Points)                  [ ] Prothrombin 51545 A                                     (3 Points)                     [ ] Factor V Leiden                                                (3 Points)                        [ ] Lupus anticoagulants                                      (3 Points)                                                           [ ] Anticardiolipin antibodies                              (3 Points)                                                       [ ] High homocysteine in the blood                   (3 Points)                                             [ ] Other congenital or acquired thrombophilia      (3 Points)                                                [ ] Heparin induced thrombocytopenia                  (3 Points)                                        MOBILITY RELATED FACTORS  [ ] Bed rest                                                         (1 Point)  [ ] Plaster cast                                                    (2 points)  [ ] Bed bound for more than 72 hours           (2 Points)    GENDER SPECIFIC FACTORS  [ ] Pregnancy or had a baby within the last month   (1 Point)  [ ] Post-partum < 6 weeks                                   (1 Point)  [ ] Hormonal therapy  or oral contraception   (1 Point)  [ ] History of pregnancy complications              (1 point)  [ ] Unexplained or recurrent              (1 Point)    OTHER RISK FACTORS                                           (1 Point)  [ ] BMI >40, smoking, diabetes requiring insulin, chemotherapy  blood transfusions and length of surgery over 2 hours    SURGERY RELATED RISK FACTORS  [ ]  Section within the last month     (1 Point)  [ ] Minor surgery                                                  (1 Point)  [ ] Arthroscopic surgery                                       (2 Points)  [ ] Planned major surgery lasting more            (2 Points)      than 45 minutes     [ ] Elective hip or knee joint replacement       (5 points)       surgery                                                TRAUMA RELATED RISK FACTORS  [ ] Fracture of the hip, pelvis, or leg                       (5 Points)  [ ] Spinal cord injury resulting in paralysis             (5 points)       (in the previous month)    [ ] Paralysis  (less than 1 month)                             (5 Points)  [ ] Multiple Trauma within 1 month                        (5 Points)    Total Score [        ]    Caprini Score 0-2: Low Risk, NO VTE prophylaxis required for most patients, encourage ambulation  Caprini Score 3-6: Moderate Risk , pharmacologic VTE prophylaxis is indicated for most patients (in the absence of contraindications)  Caprini Score Greater than or =7: High risk, pharmocologic VTE prophylaxis indicated for most patients (in the absence of contraindications)                               DASI score: 6.05 METS  DASI activity: walking, moderate housework  Loose teeth or denture: Denies  MP: Class 2    CAPRINI SCORE    AGE RELATED RISK FACTORS                                                             [ ] Age 41-60 years                                            (1 Point)  [ ] Age: 61-74 years                                           (2 Points)                 [ ] Age= 75 years                                                (3 Points)             DISEASE RELATED RISK FACTORS                                                       [ ] Edema in the lower extremities                 (1 Point)                     [ ] Varicose veins                                               (1 Point)                                 [ ] BMI > 25 Kg/m2                                            (1 Point)                                  [ ] Serious infection (ie PNA)                            (1 Point)                     [ ] Lung disease ( COPD, Emphysema)            (1 Point)                                                                          [ ] Acute myocardial infarction                         (1 Point)                  [ ] Congestive heart failure (in the previous month)  (1 Point)         [ ] Inflammatory bowel disease                            (1 Point)                  [ ] Central venous access, PICC or Port               (2 points)       (within the last month)                                                                [ ] Stroke (in the previous month)                        (5 Points)    [ ] Previous or present malignancy                       (2 points)                                                                                                                                                         HEMATOLOGY RELATED FACTORS                                                         [ ] Prior episodes of VTE                                     (3 Points)                     [ ] Positive family history for VTE                      (3 Points)                  [ ] Prothrombin 30770 A                                     (3 Points)                     [ ] Factor V Leiden                                                (3 Points)                        [ ] Lupus anticoagulants                                      (3 Points)                                                           [ ] Anticardiolipin antibodies                              (3 Points)                                                       [ ] High homocysteine in the blood                   (3 Points)                                             [ ] Other congenital or acquired thrombophilia      (3 Points)                                                [ ] Heparin induced thrombocytopenia                  (3 Points)                                        MOBILITY RELATED FACTORS  [ ] Bed rest                                                         (1 Point)  [ ] Plaster cast                                                    (2 points)  [ ] Bed bound for more than 72 hours           (2 Points)    GENDER SPECIFIC FACTORS  [ ] Pregnancy or had a baby within the last month   (1 Point)  [ ] Post-partum < 6 weeks                                   (1 Point)  [ ] Hormonal therapy  or oral contraception   (1 Point)  [ ] History of pregnancy complications              (1 point)  [ ] Unexplained or recurrent              (1 Point)    OTHER RISK FACTORS                                           (1 Point)  [ ] BMI >40, smoking, diabetes requiring insulin, chemotherapy  blood transfusions and length of surgery over 2 hours    SURGERY RELATED RISK FACTORS  [ ]  Section within the last month     (1 Point)  [ ] Minor surgery                                                  (1 Point)  [ ] Arthroscopic surgery                                       (2 Points)  [ ] Planned major surgery lasting more            (2 Points)      than 45 minutes     [ ] Elective hip or knee joint replacement       (5 points)       surgery                                                TRAUMA RELATED RISK FACTORS  [ ] Fracture of the hip, pelvis, or leg                       (5 Points)  [ ] Spinal cord injury resulting in paralysis             (5 points)       (in the previous month)    [ ] Paralysis  (less than 1 month)                             (5 Points)  [ ] Multiple Trauma within 1 month                        (5 Points)    Total Score [        ]    Caprini Score 0-2: Low Risk, NO VTE prophylaxis required for most patients, encourage ambulation  Caprini Score 3-6: Moderate Risk , pharmacologic VTE prophylaxis is indicated for most patients (in the absence of contraindications)  Caprini Score Greater than or =7: High risk, pharmocologic VTE prophylaxis indicated for most patients (in the absence of contraindications)                               DASI score: 6.05 METS  DASI activity: walking, moderate housework  Loose teeth or denture: Denies  MP: Class 2    CAPRINI SCORE    AGE RELATED RISK FACTORS                                                             [X ] Age 41-60 years                                            (1 Point)  [ ] Age: 61-74 years                                           (2 Points)                 [ ] Age= 75 years                                                (3 Points)             DISEASE RELATED RISK FACTORS                                                       [ ] Edema in the lower extremities                 (1 Point)                     [ ] Varicose veins                                               (1 Point)                                 [ ] BMI > 25 Kg/m2                                            (1 Point)                                  [ ] Serious infection (ie PNA)                            (1 Point)                     [ ] Lung disease ( COPD, Emphysema)            (1 Point)                                                                          [ ] Acute myocardial infarction                         (1 Point)                  [ ] Congestive heart failure (in the previous month)  (1 Point)         [ ] Inflammatory bowel disease                            (1 Point)                  [ ] Central venous access, PICC or Port               (2 points)       (within the last month)                                                                [ ] Stroke (in the previous month)                        (5 Points)    [ ] Previous or present malignancy                       (2 points)                                                                                                                                                         HEMATOLOGY RELATED FACTORS                                                         [ ] Prior episodes of VTE                                     (3 Points)                     [ ] Positive family history for VTE                      (3 Points)                  [ ] Prothrombin 70426 A                                     (3 Points)                     [ ] Factor V Leiden                                                (3 Points)                        [ ] Lupus anticoagulants                                      (3 Points)                                                           [ ] Anticardiolipin antibodies                              (3 Points)                                                       [ ] High homocysteine in the blood                   (3 Points)                                             [ ] Other congenital or acquired thrombophilia      (3 Points)                                                [ ] Heparin induced thrombocytopenia                  (3 Points)                                        MOBILITY RELATED FACTORS  [ ] Bed rest                                                         (1 Point)  [ ] Plaster cast                                                    (2 points)  [ ] Bed bound for more than 72 hours           (2 Points)    GENDER SPECIFIC FACTORS  [ ] Pregnancy or had a baby within the last month   (1 Point)  [ ] Post-partum < 6 weeks                                   (1 Point)  [ ] Hormonal therapy  or oral contraception   (1 Point)  [ ] History of pregnancy complications              (1 point)  [ ] Unexplained or recurrent              (1 Point)    OTHER RISK FACTORS                                           (1 Point)  [ ] BMI >40, smoking, diabetes requiring insulin, chemotherapy  blood transfusions and length of surgery over 2 hours    SURGERY RELATED RISK FACTORS  [ ]  Section within the last month     (1 Point)  [ ] Minor surgery                                                  (1 Point)  [ ] Arthroscopic surgery                                       (2 Points)  [ X] Planned major surgery lasting more            (2 Points)      than 45 minutes     [ ] Elective hip or knee joint replacement       (5 points)       surgery                                                TRAUMA RELATED RISK FACTORS  [ ] Fracture of the hip, pelvis, or leg                       (5 Points)  [ ] Spinal cord injury resulting in paralysis             (5 points)       (in the previous month)    [ ] Paralysis  (less than 1 month)                             (5 Points)  [ ] Multiple Trauma within 1 month                        (5 Points)    Total Score [     3   ]    Caprini Score 0-2: Low Risk, NO VTE prophylaxis required for most patients, encourage ambulation  Caprini Score 3-6: Moderate Risk , pharmacologic VTE prophylaxis is indicated for most patients (in the absence of contraindications)  Caprini Score Greater than or =7: High risk, pharmocologic VTE prophylaxis indicated for most patients (in the absence of contraindications)

## 2025-04-11 NOTE — H&P PST ADULT - HISTORY OF PRESENT ILLNESS
54 y/o M with PMHx significant for Type A aortic dissection s/p AV resuspension, ascending aorta and hemiarch replacement, CAD, CABG x 1 (5/2013), s/p Aortic Valve Replacement, s/p TEVAR (05/2023), PE (s/p IVC filter, subsequent removal by Dr. Ellison 9/13/23, not on AC), necrotizing pancreatitis s/p exp lap 5/31/23, Symptomatic Anemia s/p multiple Blood Transfusions, 1 unit transfused last week, Seizure disorder (2018-present), reports he has been experiencing multiple seizure episodes per month, each occurrence lasting a few seconds, described as unresponsive, staring, drooling. not responding to medication. Patient is now scheduled for Stage 1 Right Stereo EEG for Implantation of Electrodes with Dr. Rowe on 04/14/2025.

## 2025-04-11 NOTE — H&P PST ADULT - NSICDXPASTSURGICALHX_GEN_ALL_CORE_FT
PAST SURGICAL HISTORY:  Elective surgery IVC filter    H/O aortic aneurysm repair     H/O aortic valve repair     H/O exploratory laparotomy     S/P aortic bifurcation bypass graft     S/P CABG x 1

## 2025-04-11 NOTE — H&P PST ADULT - BLOOD TRANSFUSION, PREVIOUS, PROFILE
----- Message from Dejah Lopez CMA sent at 5/21/2020 12:47 PM CDT -----  Regarding: FW: Non-Urgent Medical Question  Contact: [phone]    ----- Message -----  From: Krissy Cherry  Sent: 5/21/2020  12:26 PM CDT  To: , #  Subject: Non-Urgent Medical Question                      Good afternoon Dr. Closser,    To have my return to work date extended, will I have to schedule an appointment for an office visit?  Finally my appointment to see the GI is June 2, 2020. This will be my first appointment with this specialist. I don't know if they'll start a new medication. This virus haven't reached its peak. Also, I'm not comfortable returning to work yet [place]. Please let me know if this is possible. I look forward to hearing from you.      
Work from home letter extended through June due to covid 19  
yes

## 2025-04-11 NOTE — H&P PST ADULT - PROBLEM SELECTOR PLAN 1
Scheduled for Stage 1: Right Stereo EEG for Implantation  Pre-procedure labs collected. Surgical soap given to patient. PST instructions provided. Patient verbalized understanding of instructions.    Discussed case with Anesthesia and Dr. Rowe. Alise advised to hold Aspirin 81mg from today until procedure. Last dose would have been yesterday 4/10/25.    Instructed patient to abstain from Marijuana use at least 24 hours prior to procedure.

## 2025-04-11 NOTE — H&P PST ADULT - NSICDXPASTMEDICALHX_GEN_ALL_CORE_FT
PAST MEDICAL HISTORY:  Anemia     Aortic dissection     CAD (coronary artery disease)     DVT, lower extremity     HTN (hypertension)     Hypertension     Pancreatitis hospitalized for 5 months per spouse    Pulmonary embolism     Seizure disorder     Status post endovascular aneurysm repair (EVAR)      PAST MEDICAL HISTORY:  Anemia     Aortic dissection     CAD (coronary artery disease)     DVT, lower extremity     HTN (hypertension)     Hypertension     Marijuana use     Pancreatitis hospitalized for 5 months per spouse    Pulmonary embolism     Seizure disorder     Status post endovascular aneurysm repair (EVAR)

## 2025-04-11 NOTE — H&P PST ADULT - PROBLEM SELECTOR PROBLEM 1
Localization-related symptomatic epilepsy and epileptic syndromes with simple partial seizures, intractable, without status epilepticus

## 2025-04-12 PROBLEM — G40.909 EPILEPSY, UNSPECIFIED, NOT INTRACTABLE, WITHOUT STATUS EPILEPTICUS: Chronic | Status: INACTIVE | Noted: 2023-04-27 | Resolved: 2025-04-11

## 2025-04-14 ENCOUNTER — TRANSCRIPTION ENCOUNTER (OUTPATIENT)
Age: 55
End: 2025-04-14

## 2025-04-14 ENCOUNTER — INPATIENT (INPATIENT)
Facility: HOSPITAL | Age: 55
LOS: 7 days | Discharge: HOME | DRG: 25 | End: 2025-04-22
Attending: NEUROLOGICAL SURGERY | Admitting: NEUROLOGICAL SURGERY
Payer: MEDICARE

## 2025-04-14 ENCOUNTER — APPOINTMENT (OUTPATIENT)
Dept: NEUROSURGERY | Facility: HOSPITAL | Age: 55
End: 2025-04-14

## 2025-04-14 VITALS
SYSTOLIC BLOOD PRESSURE: 130 MMHG | TEMPERATURE: 99 F | HEIGHT: 72.01 IN | OXYGEN SATURATION: 100 % | RESPIRATION RATE: 17 BRPM | HEART RATE: 57 BPM | WEIGHT: 166.89 LBS | DIASTOLIC BLOOD PRESSURE: 72 MMHG

## 2025-04-14 DIAGNOSIS — Z95.828 PRESENCE OF OTHER VASCULAR IMPLANTS AND GRAFTS: Chronic | ICD-10-CM

## 2025-04-14 DIAGNOSIS — G40.119 LOCALIZATION-RELATED (FOCAL) (PARTIAL) SYMPTOMATIC EPILEPSY AND EPILEPTIC SYNDROMES WITH SIMPLE PARTIAL SEIZURES, INTRACTABLE, WITHOUT STATUS EPILEPTICUS: ICD-10-CM

## 2025-04-14 DIAGNOSIS — Z98.890 OTHER SPECIFIED POSTPROCEDURAL STATES: Chronic | ICD-10-CM

## 2025-04-14 DIAGNOSIS — Z41.9 ENCOUNTER FOR PROCEDURE FOR PURPOSES OTHER THAN REMEDYING HEALTH STATE, UNSPECIFIED: Chronic | ICD-10-CM

## 2025-04-14 DIAGNOSIS — Z95.1 PRESENCE OF AORTOCORONARY BYPASS GRAFT: Chronic | ICD-10-CM

## 2025-04-14 LAB — GLUCOSE BLDC GLUCOMTR-MCNC: 131 MG/DL — HIGH (ref 70–99)

## 2025-04-14 PROCEDURE — 99291 CRITICAL CARE FIRST HOUR: CPT

## 2025-04-14 PROCEDURE — 95720 EEG PHY/QHP EA INCR W/VEEG: CPT

## 2025-04-14 PROCEDURE — 70450 CT HEAD/BRAIN W/O DYE: CPT | Mod: 26

## 2025-04-14 PROCEDURE — 61760 IMPLANT BRAIN ELECTRODES: CPT

## 2025-04-14 PROCEDURE — 93970 EXTREMITY STUDY: CPT | Mod: 26

## 2025-04-14 DEVICE — ELCTR SEEG (6 CONTACT): Type: IMPLANTABLE DEVICE | Site: RIGHT | Status: FUNCTIONAL

## 2025-04-14 DEVICE — ELECT 16 CONTACT DEPTH: Type: IMPLANTABLE DEVICE | Site: RIGHT | Status: FUNCTIONAL

## 2025-04-14 DEVICE — IMPLANTABLE DEVICE: Type: IMPLANTABLE DEVICE | Site: RIGHT | Status: FUNCTIONAL

## 2025-04-14 DEVICE — BOLT ANCHOR 2.4X30MM: Type: IMPLANTABLE DEVICE | Site: RIGHT | Status: FUNCTIONAL

## 2025-04-14 DEVICE — KIT DVC SENSIGHT DBS BURR HOLE PLATE: Type: IMPLANTABLE DEVICE | Site: RIGHT | Status: FUNCTIONAL

## 2025-04-14 RX ORDER — GLUCAGON 3 MG/1
1 POWDER NASAL ONCE
Refills: 0 | Status: DISCONTINUED | OUTPATIENT
Start: 2025-04-14 | End: 2025-04-15

## 2025-04-14 RX ORDER — BRIVARACETAM 75 MG/1
100 TABLET, FILM COATED ORAL
Refills: 0 | Status: DISCONTINUED | OUTPATIENT
Start: 2025-04-14 | End: 2025-04-15

## 2025-04-14 RX ORDER — DEXTROSE 50 % IN WATER 50 %
25 SYRINGE (ML) INTRAVENOUS ONCE
Refills: 0 | Status: DISCONTINUED | OUTPATIENT
Start: 2025-04-14 | End: 2025-04-15

## 2025-04-14 RX ORDER — INSULIN LISPRO 100 U/ML
INJECTION, SOLUTION INTRAVENOUS; SUBCUTANEOUS
Refills: 0 | Status: DISCONTINUED | OUTPATIENT
Start: 2025-04-14 | End: 2025-04-15

## 2025-04-14 RX ORDER — BISACODYL 5 MG
5 TABLET, DELAYED RELEASE (ENTERIC COATED) ORAL DAILY
Refills: 0 | Status: DISCONTINUED | OUTPATIENT
Start: 2025-04-14 | End: 2025-04-21

## 2025-04-14 RX ORDER — HYDROMORPHONE/SOD CHLOR,ISO/PF 2 MG/10 ML
0.5 SYRINGE (ML) INJECTION EVERY 6 HOURS
Refills: 0 | Status: DISCONTINUED | OUTPATIENT
Start: 2025-04-14 | End: 2025-04-14

## 2025-04-14 RX ORDER — DEXTROSE 50 % IN WATER 50 %
12.5 SYRINGE (ML) INTRAVENOUS ONCE
Refills: 0 | Status: DISCONTINUED | OUTPATIENT
Start: 2025-04-14 | End: 2025-04-15

## 2025-04-14 RX ORDER — CEFAZOLIN SODIUM IN 0.9 % NACL 3 G/100 ML
2000 INTRAVENOUS SOLUTION, PIGGYBACK (ML) INTRAVENOUS ONCE
Refills: 0 | Status: COMPLETED | OUTPATIENT
Start: 2025-04-14 | End: 2025-04-14

## 2025-04-14 RX ORDER — CEFAZOLIN SODIUM IN 0.9 % NACL 3 G/100 ML
2000 INTRAVENOUS SOLUTION, PIGGYBACK (ML) INTRAVENOUS EVERY 8 HOURS
Refills: 0 | Status: COMPLETED | OUTPATIENT
Start: 2025-04-14 | End: 2025-04-15

## 2025-04-14 RX ORDER — POLYETHYLENE GLYCOL 3350 17 G/17G
17 POWDER, FOR SOLUTION ORAL DAILY
Refills: 0 | Status: DISCONTINUED | OUTPATIENT
Start: 2025-04-14 | End: 2025-04-15

## 2025-04-14 RX ORDER — LIDOCAINE HCL/PF 10 MG/ML
0.2 VIAL (ML) INJECTION ONCE
Refills: 0 | Status: DISCONTINUED | OUTPATIENT
Start: 2025-04-14 | End: 2025-04-14

## 2025-04-14 RX ORDER — HYDROMORPHONE/SOD CHLOR,ISO/PF 2 MG/10 ML
0.5 SYRINGE (ML) INJECTION ONCE
Refills: 0 | Status: DISCONTINUED | OUTPATIENT
Start: 2025-04-14 | End: 2025-04-14

## 2025-04-14 RX ORDER — ATORVASTATIN CALCIUM 80 MG/1
20 TABLET, FILM COATED ORAL AT BEDTIME
Refills: 0 | Status: DISCONTINUED | OUTPATIENT
Start: 2025-04-14 | End: 2025-04-21

## 2025-04-14 RX ORDER — ONDANSETRON HCL/PF 4 MG/2 ML
4 VIAL (ML) INJECTION EVERY 6 HOURS
Refills: 0 | Status: DISCONTINUED | OUTPATIENT
Start: 2025-04-14 | End: 2025-04-21

## 2025-04-14 RX ORDER — ACETAMINOPHEN 500 MG/5ML
650 LIQUID (ML) ORAL EVERY 6 HOURS
Refills: 0 | Status: DISCONTINUED | OUTPATIENT
Start: 2025-04-14 | End: 2025-04-21

## 2025-04-14 RX ORDER — LACOSAMIDE 150 MG/1
300 TABLET, FILM COATED ORAL
Refills: 0 | Status: DISCONTINUED | OUTPATIENT
Start: 2025-04-14 | End: 2025-04-15

## 2025-04-14 RX ORDER — OXYCODONE HYDROCHLORIDE 30 MG/1
5 TABLET ORAL EVERY 4 HOURS
Refills: 0 | Status: DISCONTINUED | OUTPATIENT
Start: 2025-04-14 | End: 2025-04-16

## 2025-04-14 RX ORDER — AMLODIPINE BESYLATE 10 MG/1
10 TABLET ORAL DAILY
Refills: 0 | Status: DISCONTINUED | OUTPATIENT
Start: 2025-04-14 | End: 2025-04-21

## 2025-04-14 RX ORDER — DEXTROSE 50 % IN WATER 50 %
15 SYRINGE (ML) INTRAVENOUS ONCE
Refills: 0 | Status: DISCONTINUED | OUTPATIENT
Start: 2025-04-14 | End: 2025-04-15

## 2025-04-14 RX ORDER — SENNA 187 MG
2 TABLET ORAL AT BEDTIME
Refills: 0 | Status: DISCONTINUED | OUTPATIENT
Start: 2025-04-14 | End: 2025-04-21

## 2025-04-14 RX ORDER — OXYCODONE HYDROCHLORIDE 30 MG/1
10 TABLET ORAL EVERY 4 HOURS
Refills: 0 | Status: DISCONTINUED | OUTPATIENT
Start: 2025-04-14 | End: 2025-04-16

## 2025-04-14 RX ORDER — SODIUM CHLORIDE 9 G/1000ML
1000 INJECTION, SOLUTION INTRAVENOUS
Refills: 0 | Status: DISCONTINUED | OUTPATIENT
Start: 2025-04-14 | End: 2025-04-15

## 2025-04-14 RX ORDER — TRAMADOL HYDROCHLORIDE 50 MG/1
25 TABLET, FILM COATED ORAL ONCE
Refills: 0 | Status: DISCONTINUED | OUTPATIENT
Start: 2025-04-14 | End: 2025-04-14

## 2025-04-14 RX ADMIN — AMLODIPINE BESYLATE 10 MILLIGRAM(S): 10 TABLET ORAL at 22:11

## 2025-04-14 RX ADMIN — OXYCODONE HYDROCHLORIDE 10 MILLIGRAM(S): 30 TABLET ORAL at 23:18

## 2025-04-14 RX ADMIN — Medication 0.5 MILLIGRAM(S): at 16:00

## 2025-04-14 RX ADMIN — Medication 650 MILLIGRAM(S): at 20:10

## 2025-04-14 RX ADMIN — BRIVARACETAM 100 MILLIGRAM(S): 75 TABLET, FILM COATED ORAL at 17:32

## 2025-04-14 RX ADMIN — TRAMADOL HYDROCHLORIDE 25 MILLIGRAM(S): 50 TABLET, FILM COATED ORAL at 23:57

## 2025-04-14 RX ADMIN — Medication 0.5 MILLIGRAM(S): at 15:45

## 2025-04-14 RX ADMIN — ATORVASTATIN CALCIUM 20 MILLIGRAM(S): 80 TABLET, FILM COATED ORAL at 21:24

## 2025-04-14 RX ADMIN — OXYCODONE HYDROCHLORIDE 10 MILLIGRAM(S): 30 TABLET ORAL at 23:50

## 2025-04-14 RX ADMIN — Medication 650 MILLIGRAM(S): at 20:48

## 2025-04-14 RX ADMIN — Medication 100 MILLIGRAM(S): at 21:25

## 2025-04-14 RX ADMIN — LACOSAMIDE 300 MILLIGRAM(S): 150 TABLET, FILM COATED ORAL at 17:32

## 2025-04-14 NOTE — PROGRESS NOTE ADULT - SUBJECTIVE AND OBJECTIVE BOX
SUMMARY:  55M, PMH for Type A aortic dissection s/p AV resuspension, ascending aorta and hemiarch replacement, CAD, CABG x 1 (5/2013), s/p Aortic Valve Replacement, s/p TEVAR (05/2023), PE (s/p IVC filter, subsequent removal by Dr. Ellison 9/13/23, not on AC), necrotizing pancreatitis s/p exp lap 5/31/23, Symptomatic Anemia, seizure disorder (2018-present), reports he has been experiencing multiple seizure episodes per month, each occurrence lasting a few seconds, described as unresponsive, staring, drooling. not responding to medication. Admitted for scheduled Stage 1 Right Stereo EEG for Implantation of Electrodes with Dr. Rowe on 04/14/2025.    ICU COURSE:  4/14/25: admitted to [facility] s/p implantation of 11 sEEG depth electrodes.     REVIEW OF SYSTEMS: None other than stated in HPI    ALLERGIES: Allergies    No Known Allergies    Intolerances        VITALS/DATA/ORDERS:    T(C): 36.6 (04-14-25 @ 15:10), Max: 37.1 (04-14-25 @ 09:36)  HR: 64 (04-14-25 @ 15:30) (57 - 69)  BP: 130/72 (04-14-25 @ 09:36) (130/72 - 130/72)  RR: 15 (04-14-25 @ 15:30) (15 - 22)  SpO2: 97% (04-14-25 @ 15:30) (96% - 100%)                                      acetaminophen     Tablet .. 650 milliGRAM(s) Oral every 6 hours PRN  amLODIPine   Tablet 10 milliGRAM(s) Oral daily  atorvastatin 20 milliGRAM(s) Oral at bedtime  bisacodyl 5 milliGRAM(s) Oral daily PRN  brivaracetam 100 milliGRAM(s) Oral two times a day  ceFAZolin   IVPB 2000 milliGRAM(s) IV Intermittent every 8 hours  chlorhexidine 4% Liquid 1 Application(s) Topical daily  dextrose 5%. 1000 milliLiter(s) IV Continuous <Continuous>  dextrose 5%. 1000 milliLiter(s) IV Continuous <Continuous>  dextrose 50% Injectable 25 Gram(s) IV Push once  dextrose 50% Injectable 12.5 Gram(s) IV Push once  dextrose 50% Injectable 25 Gram(s) IV Push once  dextrose Oral Gel 15 Gram(s) Oral once PRN  glucagon  Injectable 1 milliGRAM(s) IntraMuscular once  hydrochlorothiazide 12.5 milliGRAM(s) Oral daily  HYDROmorphone  Injectable 0.5 milliGRAM(s) IV Push once  insulin lispro (ADMELOG) corrective regimen sliding scale   SubCutaneous three times a day before meals  lacosamide 300 milliGRAM(s) Oral two times a day  ondansetron Injectable 4 milliGRAM(s) IV Push every 6 hours PRN  oxyCODONE    IR 5 milliGRAM(s) Oral every 4 hours PRN  oxyCODONE    IR 10 milliGRAM(s) Oral every 4 hours PRN  polyethylene glycol 3350 17 Gram(s) Oral daily  senna 2 Tablet(s) Oral at bedtime  sodium chloride 0.9%. 1000 milliLiter(s) IV Continuous <Continuous>      EXAMINATION:  General: No acute distress  HEENT: Anicteric sclerae  Cardiac: [id number]  Lungs: Clear  Abdomen: Soft, non-tender, +BS  Extremities: No c/c/e  Skin/Incision Site: Clean, dry and intact  Neurologic: AOx3, EOMI, no facial, no drift, GODINEZ 5/5, SILT.

## 2025-04-14 NOTE — CHART NOTE - NSCHARTNOTEFT_GEN_A_CORE
EEG Prelim Report, first 1 hour    Frequent spikes, RDa RDh medial contacts.  Frequent spikes, RIp RTi RTo lateral contacts.    Final report to follow.    Rosemary Zafar MD

## 2025-04-14 NOTE — CHART NOTE - NSCHARTNOTEFT_GEN_A_CORE
CAPRINI SCORE [CLOT] Score on Admission for     AGE RELATED RISK FACTORS                                                       MOBILITY RELATED FACTORS  [x] Age 41-60 years                                            (1 Point)                  [ ] Bed rest                                                        (1 Point)  [ ] Age 61-74 years                                           (2 Points)                 [ ] Plaster cast                                                   (2 Points)  [ ] Age > 75 years                                              (3 Points)                 [ ] Bed bound for more than 72 hours         (2 Points)    DISEASE RELATED RISK FACTORS                                               GENDER SPECIFIC FACTORS  [ ] Edema in the lower extremities                       (1 Point)           [ ] Pregnancy                                                          (1 Point)  [ ] Varicose veins                                               (1 Point)                  [ ] Post-partum < 6 weeks                                   (1 Point)             [ ] BMI > 25 Kg/m2                                            (1 Point)                  [ ] Hormonal therapy  or oral contraception   (1 Point)                 [ ] Sepsis (in the previous month)                        (1 Point)            [ ] History of pregnancy complications             (1 point)  [ ] Pneumonia or serious lung disease                                            [ ] Unexplained or recurrent               (1 Point)           (in the previous month)                               (1 Point)  [ ] Abnormal pulmonary function test                     (1 Point)                 SURGERY RELATED RISK FACTORS (include planned surgeries)  [ ] Acute myocardial infarction                              (1 Point)                 [ ]  Section                                             (1 Point)  [ ] Congestive heart failure (in the previous month)  (1 Point)        [ ] Minor surgery                                                  (1 Point)   [ ] Inflammatory bowel disease                             (1 Point)                 [ ] Arthroscopic surgery                                        (2 Points)  [ ] Central venous access                                      (2 Points)  [ ] History / presence of malignancy                   (2 points)   [x] General surgery lasting more than 45 minutes   (2 Points)       [ ] Stroke (in the previous month)                          (5 Points)               [ ] Elective arthroplasty                                         (5 Points)                                                                                                                                               HEMATOLOGY RELATED FACTORS                                                 TRAUMA RELATED RISK FACTORS  [x] Prior episodes of VTE                                     (3 Points)                [ ] Fracture of the hip, pelvis, or leg                       (5 Points)  [ ] Positive family history for VTE                         (3 Points)             [ ] Acute spinal cord injury (in the previous month)  (5 Points)  [ ] Prothrombin 03065 A                                     (3 Points)                [ ] Paralysis  (less than 1 month)                             (5 Points)  [ ] Factor V Leiden                                             (3 Points)                   [ ] Multiple Trauma within 1 month                        (5 Points)  [ ] Lupus anticoagulants                                     (3 Points)                                                           [ ] Anticardiolipin antibodies                               (3 Points)                                                       [ ] High homocysteine in the blood                      (3 Points)                                             [ ] Other congenital or acquired thrombophilia      (3 Points)                                                [ ] Heparin induced thrombocytopenia                  (3 Points)                                          Total Score [6]    Risk:  Very low 0   Low 1 to 2   Moderate 3 to 4   High =5       VTE Prophylaxis Recommendations:  [x] mechanical pneumatic compression devices                                      [ ] contraindicated: _____________________  [ ] chemoprophylaxis                                                                                    [ ] contraindicated: _____________________    **** HIGH LIKELIHOOD DVT PRESENT ON ADMISSION  [ ] (please order LE dopplers within 24 hours of admission)

## 2025-04-14 NOTE — PROGRESS NOTE ADULT - ASSESSMENT
ASSESSMENT/PLAN:    NEURO:  - POD0 sEEG with insertion of 11 depth electrodes.   - vEEG.   - Briviact 100BID, Vimpat 300BID, AEDs per Epilepsy.  - Postop CTH w/o significant heme.   - Postop MRI noncon in AM.   Pain control: Tylenol, Oxy 5/10 PRN.  Activity: [] mobilize as tolerated [X] Bedrest [X] PT [X] OT [] PMNR    PULM:  SpO2 goal >92%.   - Incentive spirometry.     CV:  SBP goal 100-160.   - Continue home meds amlodipine 10 daily, Lipitor 20 daily, HCTZ 12.5 daily    RENAL:  Fluids: IV NS at 75cc/hr.    GI:  Diet: Regular pending dysphagia.  GI prophylaxis [X] not indicated [] PPI [] other:  Bowel regimen [] colace [X] senna [] other: Miralax    ENDO:   Goal euglycemia (-180)    HEME/ONC:  VTE prophylaxis: SCDs  BLE Dopp pending.    ID:  Postop Ancef    MISC:    SOCIAL/FAMILY:  [X] awaiting [] updated at bedside [] family meeting    CODE STATUS:  [X] Full Code [] DNR [] DNI [] Palliative/Comfort Care    DISPOSITION:  [X] ICU [] Stroke Unit [] Floor [] EMU [] RCU [] PCU

## 2025-04-14 NOTE — PRE-OP CHECKLIST - AS BP NONINV SITE
DISCHARGE SUMMARY from Nurse    PATIENT INSTRUCTIONS:    After general anesthesia or intravenous sedation, for 24 hours or while taking prescription Narcotics:  · Limit your activities  · Do not drive and operate hazardous machinery  · Do not make important personal or business decisions  · Do  not drink alcoholic beverages  · If you have not urinated within 8 hours after discharge, please contact your surgeon on call. Report the following to your surgeon:  · Excessive pain, swelling, redness or odor of or around the surgical area  · Temperature over 100.5  · Nausea and vomiting lasting longer than 4 hours or if unable to take medications  · Any signs of decreased circulation or nerve impairment to extremity: change in color, persistent  numbness, tingling, coldness or increase pain  · Any questions    What to do at Home:  Recommended activity: Activity as tolerated,     If you experience any of the following symptoms , please follow up with . *  Please give a list of your current medications to your Primary Care Provider. *  Please update this list whenever your medications are discontinued, doses are      changed, or new medications (including over-the-counter products) are added. *  Please carry medication information at all times in case of emergency situations. These are general instructions for a healthy lifestyle:    No smoking/ No tobacco products/ Avoid exposure to second hand smoke  Surgeon General's Warning:  Quitting smoking now greatly reduces serious risk to your health.     Obesity, smoking, and sedentary lifestyle greatly increases your risk for illness    A healthy diet, regular physical exercise & weight monitoring are important for maintaining a healthy lifestyle    You may be retaining fluid if you have a history of heart failure or if you experience any of the following symptoms:  Weight gain of 3 pounds or more overnight or 5 pounds in a week, increased swelling in our hands or feet or shortness of breath while lying flat in bed. Please call your doctor as soon as you notice any of these symptoms; do not wait until your next office visit. The discharge information has been reviewed with the patient. The patient verbalized understanding. Discharge medications reviewed with the patient and appropriate educational materials and side effects teaching were provided. ___________________________________________________________________________________________________________________________________    Patient armband removed and shredded  Patient Education        Coronary Artery Disease: Care Instructions  Your Care Instructions     The heart is a muscle, and like any muscle, it needs blood to work well. Coronary artery disease occurs when the arteries that bring oxygen-rich blood to your heart have a buildup of plaque--deposits of fats and other substances. Plaque can reduce blood flow to the heart muscle. This can cause angina symptoms such as chest pain or pressure. A heart attack can happen if blood flow is completely blocked. You can do a lot to improve your health and prevent a heart attack. Eating healthy food, not smoking, getting regular exercise, and taking your medicine are the main things you can do every day to stay healthy. Follow-up care is a key part of your treatment and safety. Be sure to make and go to all appointments, and call your doctor if you are having problems. It's also a good idea to know your test results and keep a list of the medicines you take. How can you care for yourself at home? Medicines    · Be safe with medicines. Take your medicines exactly as prescribed. Call your doctor if you think you are having a problem with your medicine. You will get more details on the specific medicines your doctor prescribes.     · You will take medicines that lower your risk of a heart attack and lower your risk of dying early from heart disease.  These medicines include:  ? Angiotensin-converting enzyme (ACE) inhibitors or angiotensin II receptor blockers (ARBs). They lower blood pressure. ? Aspirin and other blood thinners. They prevent blood clots that could cause a heart attack. ? Beta-blockers. They lower the heart's workload. ? Statins and other cholesterol medicines. They lower cholesterol.     · If your doctor has given you nitroglycerin for angina symptoms (such as chest pain or pressure) keep it with you at all times. If you have symptoms, sit down and rest, and take the first dose of nitroglycerin as directed. If your symptoms get worse or are not getting better within 5 minutes, call 911 right away. Stay on the phone. The emergency  will give you further instructions.     · Do not take any over-the-counter medicines, vitamins, or herbal products without talking to your doctor first.   Lifestyle  Ask your doctor if a cardiac rehab program is right for you. Cardiac rehab can help you make lifestyle changes. In cardiac rehab, a team of health professionals provides education and support to help you make new, healthy habits.    · Do not smoke. Avoid secondhand smoke too. Smoking can increase your risk of a heart attack or stroke. If you need help quitting, talk to your doctor about stop-smoking programs and medicines. These can increase your chances of quitting for good.     · Eat heart-healthy foods. These include vegetables, fruits, nuts, beans, lean meat, fish, and whole grains. Limit saturated fat, sodium, and alcohol. Limit drinks and foods with added sugar.     · If your doctor recommends it, get more exercise. Ask your doctor what level of exercise is safe for you. Walking is a good choice. Bit by bit, increase the amount you walk every day. Try for at least 30 minutes on most days of the week. You also may want to swim, bike, or do other activities.     · Stay at a healthy weight. Lose weight if you need to.     · Manage other health problems. These include diabetes, high blood pressure, and high cholesterol. If you think you may have a problem with alcohol or drug use, talk to your doctor.     · If you have angina symptoms, pay attention to your symptoms. This can help you see what causes them and what is typical for you.     · Avoid colds and flu. Get a pneumococcal vaccine shot. If you have had one before, ask your doctor whether you need another dose. Get a flu vaccine every year. If you must be around people with colds or flu, wash your hands often.     · If you think you have symptoms of depression, talk to your doctor. Symptoms include feeling sad or hopeless most of the time, or losing interest in activities that used to make you happy. When should you call for help? Call 911 anytime you think you may need emergency care. For example, call if:    · You have symptoms of a heart attack. These may include:  ? Chest pain or pressure, or a strange feeling in the chest.  ? Sweating. ? Shortness of breath. ? Nausea or vomiting. ? Pain, pressure, or a strange feeling in the back, neck, jaw, or upper belly or in one or both shoulders or arms. ? Lightheadedness or sudden weakness. ? A fast or irregular heartbeat. After you call 911, the  may tell you to chew 1 adult-strength or 2 to 4 low-dose aspirin. Wait for an ambulance. Do not try to drive yourself.     · You have angina symptoms (such as chest pain or pressure) that do not go away with rest or are not getting better within 5 minutes after you take a dose of nitroglycerin.     · You passed out (lost consciousness). Call your doctor now or seek immediate medical care if:    · You are having angina symptoms, such as chest pain or pressure, more often than usual, or they are different or worse than usual.     · You have new or increased shortness of breath.     · You are dizzy or lightheaded, or you feel like you may faint.    Watch closely for changes in your health, and be sure to contact your doctor if you have any problems. Where can you learn more? Go to http://www.gray.com/  Enter K405 in the search box to learn more about \"Coronary Artery Disease: Care Instructions. \"  Current as of: August 31, 2020               Content Version: 12.8  © 2006-2021 [employer]. Care instructions adapted under license by [employer] (which disclaims liability or warranty for this information). If you have questions about a medical condition or this instruction, always ask your healthcare professional. [place] [address] any warranty or liability for your use of this information. Patient Education        Fluid Restriction: Care Instructions  Your Care Instructions     A buildup of fluid in the body can cause low sodium levels in the blood. It may also cause symptoms such as swelling and pain. Your doctor may suggest that you limit liquids, including foods that contain a lot of liquid. Limiting liquids is called fluid restriction. Keeping track of the amount of fluids you take in may help you feel better. Your doctor will tell you how much fluid you can have in a day. Follow-up care is a key part of your treatment and safety. Be sure to make and go to all appointments, and call your doctor if you are having problems. It's also a good idea to know your test results and keep a list of the medicines you take. How can you care for yourself at home? · Find a way of tracking the fluids you take in that works for you. Here are two methods you can try:  ? Write down how much you drink throughout the day. ? Keep a container filled with the amount of liquid allowed for the day. As you drink liquids during the day, such as a 6-ounce cup of coffee, pour that same amount out of the container. When the container is empty, you've had your liquid for the day.   · Count any foods that will melt (such as ice cream, gelatin, or flavored ice treats) or liquid foods (such as soup) as part of your fluids for the day. Also count the liquid in canned fruits and vegetables as part of your daily intake, or drain them well before serving. · Space your liquids throughout the day. Then you won't be tempted to drink more than the amount your doctor recommends. · To relieve thirst without taking in extra water, try chewing gum, sucking on hard candy (sugarless if you have diabetes), or rinsing your mouth with water and spitting it out. Where can you learn more? Go to http://www.gray.com/  Enter J068 in the search box to learn more about \"Fluid Restriction: Care Instructions. \"  Current as of: August 31, 2020               Content Version: 12.8  © 2006-2021 [employer]. Care instructions adapted under license by [employer] (which disclaims liability or warranty for this information). If you have questions about a medical condition or this instruction, always ask your healthcare professional. [place] [address] any warranty or liability for your use of this information. Patient Education        Heart Failure: Care Instructions  Your Care Instructions     Heart failure occurs when your heart does not pump as much blood as the body needs. Failure does not mean that the heart has stopped pumping but rather that it is not pumping as well as it should. Over time, this causes fluid buildup in your lungs and other parts of your body. Fluid buildup can cause shortness of breath, fatigue, swollen ankles, and other problems. By taking medicines regularly, reducing sodium (salt) in your diet, checking your weight every day, and making lifestyle changes, you can feel better and live longer. Follow-up care is a key part of your treatment and safety. Be sure to make and go to all appointments, and call your doctor if you are having problems.  It's also a good idea to know your test results and keep a list of the medicines you take. How can you care for yourself at home? Medicines    · Be safe with medicines. Take your medicines exactly as prescribed. Call your doctor if you think you are having a problem with your medicine.     · Do not take any vitamins, over-the-counter medicine, or herbal products without talking to your doctor first. Desiree Foley not take ibuprofen (Advil or Motrin) and naproxen (Aleve) without talking to your doctor first. They could make your heart failure worse.     · You may take some of the following medicine. ? Angiotensin-converting enzyme inhibitors (ACEIs) or angiotensin II receptor blockers (ARBs) reduce the heart's workload, lower blood pressure, and reduce swelling. Taking an ACEI or ARB may lower your chance of needing to be hospitalized. ? Beta-blockers can slow heart rate, decrease blood pressure, and improve your condition. Taking a beta-blocker may lower your chance of needing to be hospitalized. ? Diuretics, also called water pills, reduce swelling. You will get more details on the specific medicines your doctor prescribes. Diet    · Your doctor may suggest that you limit sodium. Your doctor can tell you how much sodium is right for you. An example is less than 3,000 mg a day. This includes all the salt you eat in cooking or in packaged foods. People get most of their sodium from processed foods. Fast food and restaurant meals also tend to be very high in sodium.     · Ask your doctor how much liquid you can drink each day. You may have to limit liquids. Weight    · Weigh yourself without clothing at the same time each day. Record your weight. Call your doctor if you have a sudden weight gain, such as more than 2 to 3 pounds in a day or 5 pounds in a week. (Your doctor may suggest a different range of weight gain.) A sudden weight gain may mean that your heart failure is getting worse. Activity level    · Start light exercise (if your doctor says it is okay).  Even if you can only do a small amount, exercise will help you get stronger, have more energy, and manage your weight and your stress. Walking is an easy way to get exercise. Start out by walking a little more than you did before. Bit by bit, increase the amount you walk.     · When you exercise, watch for signs that your heart is working too hard. You are pushing yourself too hard if you cannot talk while you are exercising. If you become short of breath or dizzy or have chest pain, stop, sit down, and rest.     · If you feel \"wiped out\" the day after you exercise, walk slower or for a shorter distance until you can work up to a better pace.     · Get enough rest at night. Sleeping with 1 or 2 pillows under your upper body and head may help you breathe easier. Lifestyle changes    · Do not smoke. Smoking can make a heart condition worse. If you need help quitting, talk to your doctor about stop-smoking programs and medicines. These can increase your chances of quitting for good. Quitting smoking may be the most important step you can take to protect your heart.     · Limit alcohol to 2 drinks a day for men and 1 drink a day for women. Too much alcohol can cause health problems.     · Avoid getting sick from colds and the flu. Get a pneumococcal vaccine shot. If you have had one before, ask your doctor whether you need another dose. Get a flu shot each year. If you must be around people with colds or the flu, wash your hands often. When should you call for help? Call 911 if you have symptoms of sudden heart failure such as:    · You have severe trouble breathing.     · You cough up pink, foamy mucus.     · You have a new irregular or rapid heartbeat. Call your doctor now or seek immediate medical care if:    · You have new or increased shortness of breath.     · You are dizzy or lightheaded, or you feel like you may faint.     · You have sudden weight gain, such as more than 2 to 3 pounds in a day or 5 pounds in a week.  (Your doctor may suggest a different range of weight gain.)     · You have increased swelling in your legs, ankles, or feet.     · You are suddenly so tired or weak that you cannot do your usual activities. Watch closely for changes in your health, and be sure to contact your doctor if you develop new symptoms. Where can you learn more? Go to http://www.gray.com/  Enter H137 in the search box to learn more about \"Heart Failure: Care Instructions. \"  Current as of: August 31, 2020               Content Version: 12.8  © 7225-0547 [email or web address]. Care instructions adapted under license by [employer] (which disclaims liability or warranty for this information). If you have questions about a medical condition or this instruction, always ask your healthcare professional. [address] any warranty or liability for your use of this information. right upper arm

## 2025-04-14 NOTE — PROGRESS NOTE ADULT - SUBJECTIVE AND OBJECTIVE BOX
Patient seen and examined at bedside.    --Anticoagulation--    T(C): 36.6 (04-14-25 @ 20:35), Max: 37.1 (04-14-25 @ 09:36)  HR: 66 (04-14-25 @ 22:00) (54 - 73)  BP: 130/72 (04-14-25 @ 09:36) (130/72 - 130/72)  RR: 15 (04-14-25 @ 22:00) (12 - 21)  SpO2: 99% (04-14-25 @ 22:00) (96% - 100%)  Wt(kg): --    Exam:  AOx3, EOMI, no facial, no drift, GODINEZ 5/5, SILT.

## 2025-04-14 NOTE — PROGRESS NOTE ADULT - ASSESSMENT
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 Seizures last seconds, episodes of unresponsive, staring, drooling (last seizure 2/2025) Now s/p Stage 1 sEEG electrode placement with 11 electrodes with ongoing monitoring.    POD0 sEEG with insertion of 11 depth electrodes.     MR w/o in AM     if ok transfer EMU     continue AEDs     neurology to see     LED-p     TTE-p     1:1    ASA last took 4/10

## 2025-04-15 LAB
ANION GAP SERPL CALC-SCNC: 14 MMOL/L — SIGNIFICANT CHANGE UP (ref 5–17)
BUN SERPL-MCNC: 17 MG/DL — SIGNIFICANT CHANGE UP (ref 7–23)
CALCIUM SERPL-MCNC: 8.5 MG/DL — SIGNIFICANT CHANGE UP (ref 8.4–10.5)
CHLORIDE SERPL-SCNC: 105 MMOL/L — SIGNIFICANT CHANGE UP (ref 96–108)
CO2 SERPL-SCNC: 22 MMOL/L — SIGNIFICANT CHANGE UP (ref 22–31)
CREAT SERPL-MCNC: 0.9 MG/DL — SIGNIFICANT CHANGE UP (ref 0.5–1.3)
EGFR: 100 ML/MIN/1.73M2 — SIGNIFICANT CHANGE UP
EGFR: 100 ML/MIN/1.73M2 — SIGNIFICANT CHANGE UP
GLUCOSE SERPL-MCNC: 142 MG/DL — HIGH (ref 70–99)
HCT VFR BLD CALC: 25.4 % — LOW (ref 39–50)
HCT VFR BLD CALC: 27 % — LOW (ref 39–50)
HGB BLD-MCNC: 8.5 G/DL — LOW (ref 13–17)
HGB BLD-MCNC: 8.5 G/DL — LOW (ref 13–17)
MAGNESIUM SERPL-MCNC: 2 MG/DL — SIGNIFICANT CHANGE UP (ref 1.6–2.6)
MCHC RBC-ENTMCNC: 28.9 PG — SIGNIFICANT CHANGE UP (ref 27–34)
MCHC RBC-ENTMCNC: 30.4 PG — SIGNIFICANT CHANGE UP (ref 27–34)
MCHC RBC-ENTMCNC: 31.5 G/DL — LOW (ref 32–36)
MCHC RBC-ENTMCNC: 33.5 G/DL — SIGNIFICANT CHANGE UP (ref 32–36)
MCV RBC AUTO: 90.7 FL — SIGNIFICANT CHANGE UP (ref 80–100)
MCV RBC AUTO: 91.8 FL — SIGNIFICANT CHANGE UP (ref 80–100)
NRBC BLD AUTO-RTO: 0 /100 WBCS — SIGNIFICANT CHANGE UP (ref 0–0)
NRBC BLD AUTO-RTO: 0 /100 WBCS — SIGNIFICANT CHANGE UP (ref 0–0)
PHOSPHATE SERPL-MCNC: 5.4 MG/DL — HIGH (ref 2.5–4.5)
PLATELET # BLD AUTO: 215 K/UL — SIGNIFICANT CHANGE UP (ref 150–400)
PLATELET # BLD AUTO: 219 K/UL — SIGNIFICANT CHANGE UP (ref 150–400)
POTASSIUM SERPL-MCNC: 4.3 MMOL/L — SIGNIFICANT CHANGE UP (ref 3.5–5.3)
POTASSIUM SERPL-SCNC: 4.3 MMOL/L — SIGNIFICANT CHANGE UP (ref 3.5–5.3)
RBC # BLD: 2.8 M/UL — LOW (ref 4.2–5.8)
RBC # BLD: 2.94 M/UL — LOW (ref 4.2–5.8)
RBC # FLD: 21.2 % — HIGH (ref 10.3–14.5)
RBC # FLD: 21.8 % — HIGH (ref 10.3–14.5)
SODIUM SERPL-SCNC: 141 MMOL/L — SIGNIFICANT CHANGE UP (ref 135–145)
WBC # BLD: 12.22 K/UL — HIGH (ref 3.8–10.5)
WBC # BLD: 9.7 K/UL — SIGNIFICANT CHANGE UP (ref 3.8–10.5)
WBC # FLD AUTO: 12.22 K/UL — HIGH (ref 3.8–10.5)
WBC # FLD AUTO: 9.7 K/UL — SIGNIFICANT CHANGE UP (ref 3.8–10.5)

## 2025-04-15 PROCEDURE — 95720 EEG PHY/QHP EA INCR W/VEEG: CPT

## 2025-04-15 PROCEDURE — 99232 SBSQ HOSP IP/OBS MODERATE 35: CPT

## 2025-04-15 PROCEDURE — 70551 MRI BRAIN STEM W/O DYE: CPT | Mod: 26

## 2025-04-15 RX ORDER — ACETAMINOPHEN 500 MG/5ML
1000 LIQUID (ML) ORAL ONCE
Refills: 0 | Status: COMPLETED | OUTPATIENT
Start: 2025-04-15 | End: 2025-04-15

## 2025-04-15 RX ORDER — LORAZEPAM 4 MG/ML
2 VIAL (ML) INJECTION ONCE
Refills: 0 | Status: DISCONTINUED | OUTPATIENT
Start: 2025-04-15 | End: 2025-04-16

## 2025-04-15 RX ORDER — ENOXAPARIN SODIUM 100 MG/ML
40 INJECTION SUBCUTANEOUS
Refills: 0 | Status: DISCONTINUED | OUTPATIENT
Start: 2025-04-15 | End: 2025-04-15

## 2025-04-15 RX ORDER — MAGNESIUM SULFATE 500 MG/ML
1 SYRINGE (ML) INJECTION ONCE
Refills: 0 | Status: COMPLETED | OUTPATIENT
Start: 2025-04-15 | End: 2025-04-15

## 2025-04-15 RX ORDER — POLYETHYLENE GLYCOL 3350 17 G/17G
17 POWDER, FOR SOLUTION ORAL
Refills: 0 | Status: DISCONTINUED | OUTPATIENT
Start: 2025-04-15 | End: 2025-04-19

## 2025-04-15 RX ORDER — ACETAMINOPHEN 500 MG/5ML
1000 LIQUID (ML) ORAL EVERY 6 HOURS
Refills: 0 | Status: COMPLETED | OUTPATIENT
Start: 2025-04-15 | End: 2025-04-17

## 2025-04-15 RX ORDER — METOCLOPRAMIDE HCL 10 MG
10 TABLET ORAL ONCE
Refills: 0 | Status: COMPLETED | OUTPATIENT
Start: 2025-04-15 | End: 2025-04-15

## 2025-04-15 RX ORDER — OXYCODONE HYDROCHLORIDE 30 MG/1
10 TABLET ORAL ONCE
Refills: 0 | Status: DISCONTINUED | OUTPATIENT
Start: 2025-04-15 | End: 2025-04-15

## 2025-04-15 RX ORDER — ENOXAPARIN SODIUM 100 MG/ML
40 INJECTION SUBCUTANEOUS
Refills: 0 | Status: DISCONTINUED | OUTPATIENT
Start: 2025-04-15 | End: 2025-04-20

## 2025-04-15 RX ORDER — LACOSAMIDE 150 MG/1
150 TABLET, FILM COATED ORAL
Refills: 0 | Status: DISCONTINUED | OUTPATIENT
Start: 2025-04-15 | End: 2025-04-16

## 2025-04-15 RX ORDER — HYDROMORPHONE/SOD CHLOR,ISO/PF 2 MG/10 ML
0.5 SYRINGE (ML) INJECTION ONCE
Refills: 0 | Status: DISCONTINUED | OUTPATIENT
Start: 2025-04-15 | End: 2025-04-15

## 2025-04-15 RX ORDER — LACOSAMIDE 150 MG/1
200 TABLET, FILM COATED ORAL ONCE
Refills: 0 | Status: DISCONTINUED | OUTPATIENT
Start: 2025-04-15 | End: 2025-04-16

## 2025-04-15 RX ORDER — METHOCARBAMOL 500 MG/1
500 TABLET, FILM COATED ORAL EVERY 8 HOURS
Refills: 0 | Status: DISCONTINUED | OUTPATIENT
Start: 2025-04-15 | End: 2025-04-16

## 2025-04-15 RX ORDER — MELATONIN 5 MG
3 TABLET ORAL AT BEDTIME
Refills: 0 | Status: DISCONTINUED | OUTPATIENT
Start: 2025-04-15 | End: 2025-04-21

## 2025-04-15 RX ADMIN — LACOSAMIDE 300 MILLIGRAM(S): 150 TABLET, FILM COATED ORAL at 06:01

## 2025-04-15 RX ADMIN — Medication 100 MILLIGRAM(S): at 06:01

## 2025-04-15 RX ADMIN — OXYCODONE HYDROCHLORIDE 10 MILLIGRAM(S): 30 TABLET ORAL at 11:45

## 2025-04-15 RX ADMIN — OXYCODONE HYDROCHLORIDE 5 MILLIGRAM(S): 30 TABLET ORAL at 07:20

## 2025-04-15 RX ADMIN — OXYCODONE HYDROCHLORIDE 10 MILLIGRAM(S): 30 TABLET ORAL at 14:21

## 2025-04-15 RX ADMIN — OXYCODONE HYDROCHLORIDE 10 MILLIGRAM(S): 30 TABLET ORAL at 14:51

## 2025-04-15 RX ADMIN — OXYCODONE HYDROCHLORIDE 10 MILLIGRAM(S): 30 TABLET ORAL at 06:01

## 2025-04-15 RX ADMIN — Medication 102 GRAM(S): at 14:50

## 2025-04-15 RX ADMIN — Medication 400 MILLIGRAM(S): at 14:21

## 2025-04-15 RX ADMIN — Medication 0.5 MILLIGRAM(S): at 13:00

## 2025-04-15 RX ADMIN — Medication 4 MILLIGRAM(S): at 12:45

## 2025-04-15 RX ADMIN — BRIVARACETAM 100 MILLIGRAM(S): 75 TABLET, FILM COATED ORAL at 06:01

## 2025-04-15 RX ADMIN — Medication 1000 MILLIGRAM(S): at 14:51

## 2025-04-15 RX ADMIN — Medication 1000 MILLIGRAM(S): at 04:15

## 2025-04-15 RX ADMIN — Medication 1 APPLICATION(S): at 11:18

## 2025-04-15 RX ADMIN — OXYCODONE HYDROCHLORIDE 5 MILLIGRAM(S): 30 TABLET ORAL at 07:50

## 2025-04-15 RX ADMIN — AMLODIPINE BESYLATE 10 MILLIGRAM(S): 10 TABLET ORAL at 06:01

## 2025-04-15 RX ADMIN — TRAMADOL HYDROCHLORIDE 25 MILLIGRAM(S): 50 TABLET, FILM COATED ORAL at 00:39

## 2025-04-15 RX ADMIN — Medication 10 MILLIGRAM(S): at 00:58

## 2025-04-15 RX ADMIN — OXYCODONE HYDROCHLORIDE 10 MILLIGRAM(S): 30 TABLET ORAL at 20:00

## 2025-04-15 RX ADMIN — Medication 0.5 MILLIGRAM(S): at 22:15

## 2025-04-15 RX ADMIN — LACOSAMIDE 150 MILLIGRAM(S): 150 TABLET, FILM COATED ORAL at 17:19

## 2025-04-15 RX ADMIN — Medication 0.5 MILLIGRAM(S): at 12:45

## 2025-04-15 RX ADMIN — OXYCODONE HYDROCHLORIDE 10 MILLIGRAM(S): 30 TABLET ORAL at 11:15

## 2025-04-15 RX ADMIN — ATORVASTATIN CALCIUM 20 MILLIGRAM(S): 80 TABLET, FILM COATED ORAL at 21:35

## 2025-04-15 RX ADMIN — Medication 0.5 MILLIGRAM(S): at 21:39

## 2025-04-15 RX ADMIN — Medication 1000 MILLILITER(S): at 00:59

## 2025-04-15 RX ADMIN — Medication 400 MILLIGRAM(S): at 03:42

## 2025-04-15 RX ADMIN — Medication 3 MILLIGRAM(S): at 21:35

## 2025-04-15 RX ADMIN — OXYCODONE HYDROCHLORIDE 10 MILLIGRAM(S): 30 TABLET ORAL at 19:13

## 2025-04-15 RX ADMIN — Medication 102 GRAM(S): at 00:58

## 2025-04-15 RX ADMIN — OXYCODONE HYDROCHLORIDE 10 MILLIGRAM(S): 30 TABLET ORAL at 06:42

## 2025-04-15 RX ADMIN — ENOXAPARIN SODIUM 40 MILLIGRAM(S): 100 INJECTION SUBCUTANEOUS at 17:19

## 2025-04-15 RX ADMIN — METHOCARBAMOL 500 MILLIGRAM(S): 500 TABLET, FILM COATED ORAL at 21:35

## 2025-04-15 RX ADMIN — Medication 400 MILLIGRAM(S): at 23:17

## 2025-04-15 NOTE — DIETITIAN INITIAL EVALUATION ADULT - OTHER INFO
NKFA per patient. Patient reports his UBW was 230lbs a few years ago before incrementally having unintentional weight loss w/ hospitalizations in the past. Reports his recent UBW is ~170lbs and finds it difficult to maintain his BW. Recent weight history per [facility] growth chart as follows: 167lbs (4/7/25), 172lbs (3/26/25), 175lbs (2/28/25), 173lbs (12/17/24), 173lbs (10/14/24), 177lbs (8/20/24), 175lbs (6/10/24).    - Hyperphosphatemia noted today.

## 2025-04-15 NOTE — DIETITIAN INITIAL EVALUATION ADULT - NUTRITIONGOAL OUTCOME1
- patient will consume >75% of meals during hospital admission to help adequately meet nutritional needs and help minimize risk of further unintentional weight loss/lean body mass loss

## 2025-04-15 NOTE — PROGRESS NOTE ADULT - SUBJECTIVE AND OBJECTIVE BOX
SUMMARY:  55M, PMH for Type A aortic dissection s/p AV resuspension, ascending aorta and hemiarch replacement, CAD, CABG x 1 (5/2013), s/p Aortic Valve Replacement, s/p TEVAR (05/2023), PE (s/p IVC filter, subsequent removal by Dr. Ellison 9/13/23, not on AC), necrotizing pancreatitis s/p exp lap 5/31/23, Symptomatic Anemia, seizure disorder (2018-present), reports he has been experiencing multiple seizure episodes per month, each occurrence lasting a few seconds, described as unresponsive, staring, drooling. not responding to medication. Admitted for scheduled Stage 1 Right Stereo EEG for Implantation of Electrodes with Dr. Rowe on 04/14/2025.    ICU COURSE:  4/14/25: admitted to NSCU s/p implantation of 11 sEEG depth electrodes.     REVIEW OF SYSTEMS: None other than stated in HPI    ALLERGIES: Allergies    No Known Allergies    Intolerances        VITALS/DATA/ORDERS:    T(C): 36.6 (04-14-25 @ 15:10), Max: 37.1 (04-14-25 @ 09:36)  HR: 64 (04-14-25 @ 15:30) (57 - 69)  BP: 130/72 (04-14-25 @ 09:36) (130/72 - 130/72)  RR: 15 (04-14-25 @ 15:30) (15 - 22)  SpO2: 97% (04-14-25 @ 15:30) (96% - 100%)                                      acetaminophen     Tablet .. 650 milliGRAM(s) Oral every 6 hours PRN  amLODIPine   Tablet 10 milliGRAM(s) Oral daily  atorvastatin 20 milliGRAM(s) Oral at bedtime  bisacodyl 5 milliGRAM(s) Oral daily PRN  brivaracetam 100 milliGRAM(s) Oral two times a day  ceFAZolin   IVPB 2000 milliGRAM(s) IV Intermittent every 8 hours  chlorhexidine 4% Liquid 1 Application(s) Topical daily  dextrose 5%. 1000 milliLiter(s) IV Continuous <Continuous>  dextrose 5%. 1000 milliLiter(s) IV Continuous <Continuous>  dextrose 50% Injectable 25 Gram(s) IV Push once  dextrose 50% Injectable 12.5 Gram(s) IV Push once  dextrose 50% Injectable 25 Gram(s) IV Push once  dextrose Oral Gel 15 Gram(s) Oral once PRN  glucagon  Injectable 1 milliGRAM(s) IntraMuscular once  hydrochlorothiazide 12.5 milliGRAM(s) Oral daily  HYDROmorphone  Injectable 0.5 milliGRAM(s) IV Push once  insulin lispro (ADMELOG) corrective regimen sliding scale   SubCutaneous three times a day before meals  lacosamide 300 milliGRAM(s) Oral two times a day  ondansetron Injectable 4 milliGRAM(s) IV Push every 6 hours PRN  oxyCODONE    IR 5 milliGRAM(s) Oral every 4 hours PRN  oxyCODONE    IR 10 milliGRAM(s) Oral every 4 hours PRN  polyethylene glycol 3350 17 Gram(s) Oral daily  senna 2 Tablet(s) Oral at bedtime  sodium chloride 0.9%. 1000 milliLiter(s) IV Continuous <Continuous>      EXAMINATION:  General: No acute distress  HEENT: Anicteric sclerae  Cardiac: [id number]  Lungs: Clear  Abdomen: Soft, non-tender, +BS  Extremities: No c/c/e  Skin/Incision Site: Clean, dry and intact  Neurologic: AOx3, EOMI, no facial, no drift, GODINEZ 5/5, SILT.

## 2025-04-15 NOTE — DIETITIAN INITIAL EVALUATION ADULT - NSICDXPASTMEDICALHX_GEN_ALL_CORE_FT
PAST MEDICAL HISTORY:  Anemia     Aortic dissection     CAD (coronary artery disease)     DVT, lower extremity     HTN (hypertension)     Hypertension     Marijuana use     Pancreatitis hospitalized for 5 months per spouse    Pulmonary embolism     Seizure disorder     Status post endovascular aneurysm repair (EVAR)

## 2025-04-15 NOTE — DIETITIAN INITIAL EVALUATION ADULT - PERTINENT LABORATORY DATA
04-15    141  |  105  |  17  ----------------------------<  142[H]  4.3   |  22  |  0.90    Ca    8.5      15 Apr 2025 00:51  Phos  5.4     04-15  Mg     2.0     04-15    POCT Blood Glucose.: 131 mg/dL (04-14-25 @ 17:17)  A1C with Estimated Average Glucose Result: 4.2 % (10-08-24 @ 08:18)

## 2025-04-15 NOTE — DIETITIAN INITIAL EVALUATION ADULT - PERSON TAUGHT/METHOD
adequate caloric/protein intake w/ food sources reviewed, current diet order, food preferences, oral nutrition supplementation, strategies to increase and maintain adequate PO intake, strategies to help promote healthy weight gain at home upon d/c, all questions were answered/verbal instruction/teach back - (Patient repeats in own words)/patient instructed

## 2025-04-15 NOTE — DIETITIAN INITIAL EVALUATION ADULT - REASON FOR ADMISSION
Per chart, patient is a 54 y/o male with PMH including type A aortic dissection (s/p AV resuspension, ascending aorta and hemiarch replacement), CAD (s/p CABGx1 5/2013), s/p AVR, s/p TEVAR (5/2023), h/o PE (s/p IVC filter, since removed 9/13/23), h/o necrotizing pancreatitis (s/p exp lap 5/31/23), symptomatic anemia (s/p multiple blood transfusions). Patient presents to [facility] for planned sEEG implantation; performed 4/14.

## 2025-04-15 NOTE — OCCUPATIONAL THERAPY INITIAL EVALUATION ADULT - PERTINENT HX OF CURRENT PROBLEM, REHAB EVAL
54 y/o M with PMHx significant for Type A aortic dissection s/p AV resuspension, ascending aorta and hemiarch replacement, CAD, CABG x 1 (5/2013), s/p Aortic Valve Replacement, s/p TEVAR (05/2023), PE (s/p IVC filter, subsequent removal by Dr. Ellison 9/13/23, not on AC), necrotizing pancreatitis s/p exp lap 5/31/23, Symptomatic Anemia s/p multiple Blood Transfusions, 1 unit transfused last week, Seizure disorder (2018-present), reports he has been experiencing multiple seizure episodes per month, each occurrence lasting a few seconds, described as unresponsive, staring, drooling. not responding to medication. Patient is now scheduled for Stage 1 Right Stereo EEG for Implantation of Electrodes with Dr. Rowe on 04/14/2025. Pt s/p implantation of 11 sEEG depth electrodes.    CT Head: Interval placement of multiple right sided depth electrodes. Streak artifact from electrodes markedly limits evaluation. No hydrocephalus, midline shift, or effacement of the basal cisterns. No gross evidence for the presence of large intracranial hemorrhage or   edema.

## 2025-04-15 NOTE — DIETITIAN INITIAL EVALUATION ADULT - REASON
Deferred 2/2 current clinical status w/ sEEG in place; muscle/fat depletion observed by clinical physical observation

## 2025-04-15 NOTE — DIETITIAN INITIAL EVALUATION ADULT - FUNCTIONAL SCREEN CURRENT LEVEL: SWALLOWING (IF SCORE 2 OR MORE FOR ANY ITEM, CONSULT REHAB SERVICES), MLM)
(126 lb 1.6 oz)   BMI 23.83 kg/m²     PHYSICAL EXAM:    NEUROLOGICAL EXAM:    Appearance:  The patient is well developed, well nourished, provides a coherent history and is in no acute distress.   Mental Status: Oriented to time, place and person. Fluent, no aphasia or dysarthria. Mood and affect appropriate.  MMSE 25/29 (problems with time orientation, immediate recall and writing a sentence)   Cranial Nerves:   Intact visual fields. LAKSHMI, EOM's full, no nystagmus, no ptosis. Facial sensation is normal. Corneal reflexes are intact. Facial movement is symmetric. Significant hearing loss. Palate is midline with normal elevation. Sternocleidomastoid and trapezius muscles are normal. Tongue is midline.   Motor:  5/5 strength. No pronator drift.    Reflexes:   Deep tendon reflexes 1+/4 and symmetrical. Downgoing toes.   Sensory:   Normal to cold and vibration.   Gait:  Steady gait. No Romberg.   Tremor:   No tremor noted.   Cerebellar:  Intact FTN/SUE/HTS.       Imaging  Brain MRI: reviewed    Labs Reviewed      Assessment:      Diagnosis Orders   1. 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 History of stroke        3. Insomnia due to medical condition  traZODone (DESYREL) 50 MG tablet      4. Anxiety and depression  traZODone (DESYREL) 50 MG tablet           Plan:   Cognitive testing was done and patient scored 25/29.  Problems with time orientation, immediate recall and writing a sentence.  Evidence of some mild in this case vascular dementia given white matter changes and prior history of stroke.  There are some associated mood disturbance.  Patient having issues with itchiness with the patch.  Hold off on Exelon patch.  Trial of donepezil 5 mg daily.  Prescription was provided.  Patient was advised to do routine mental and structured physical exercises.  Continue supervision.    Brain MRI does reveal prior bilateral coronary radiata infarcts.  Advised to start  0 = swallows foods/liquids without difficulty

## 2025-04-15 NOTE — PROGRESS NOTE ADULT - SUBJECTIVE AND OBJECTIVE BOX
SUMMARY:  55M, PMH for Type A aortic dissection s/p AV resuspension, ascending aorta and hemiarch replacement, CAD, CABG x 1 (5/2013), s/p Aortic Valve Replacement, s/p TEVAR (05/2023), PE (s/p IVC filter, subsequent removal by Dr. Ellison 9/13/23, not on AC), necrotizing pancreatitis s/p exp lap 5/31/23, Symptomatic Anemia, seizure disorder (2018-present), reports he has been experiencing multiple seizure episodes per month, each occurrence lasting a few seconds, described as unresponsive, staring, drooling. not responding to medication. Admitted for scheduled Stage 1 Right Stereo EEG for Implantation of Electrodes with Dr. Rowe on 04/14/2025.    ICU COURSE:  4/14/25: admitted to NSCU s/p implantation of 11 sEEG depth electrodes.     REVIEW OF SYSTEMS: None other than stated in HPI    ALLERGIES: Allergies    No Known Allergies    Intolerances        VITALS/DATA/ORDERS:    T(C): 36.6 (04-14-25 @ 15:10), Max: 37.1 (04-14-25 @ 09:36)  HR: 64 (04-14-25 @ 15:30) (57 - 69)  BP: 130/72 (04-14-25 @ 09:36) (130/72 - 130/72)  RR: 15 (04-14-25 @ 15:30) (15 - 22)  SpO2: 97% (04-14-25 @ 15:30) (96% - 100%)                                      acetaminophen     Tablet .. 650 milliGRAM(s) Oral every 6 hours PRN  amLODIPine   Tablet 10 milliGRAM(s) Oral daily  atorvastatin 20 milliGRAM(s) Oral at bedtime  bisacodyl 5 milliGRAM(s) Oral daily PRN  brivaracetam 100 milliGRAM(s) Oral two times a day  ceFAZolin   IVPB 2000 milliGRAM(s) IV Intermittent every 8 hours  chlorhexidine 4% Liquid 1 Application(s) Topical daily  dextrose 5%. 1000 milliLiter(s) IV Continuous <Continuous>  dextrose 5%. 1000 milliLiter(s) IV Continuous <Continuous>  dextrose 50% Injectable 25 Gram(s) IV Push once  dextrose 50% Injectable 12.5 Gram(s) IV Push once  dextrose 50% Injectable 25 Gram(s) IV Push once  dextrose Oral Gel 15 Gram(s) Oral once PRN  glucagon  Injectable 1 milliGRAM(s) IntraMuscular once  hydrochlorothiazide 12.5 milliGRAM(s) Oral daily  HYDROmorphone  Injectable 0.5 milliGRAM(s) IV Push once  insulin lispro (ADMELOG) corrective regimen sliding scale   SubCutaneous three times a day before meals  lacosamide 300 milliGRAM(s) Oral two times a day  ondansetron Injectable 4 milliGRAM(s) IV Push every 6 hours PRN  oxyCODONE    IR 5 milliGRAM(s) Oral every 4 hours PRN  oxyCODONE    IR 10 milliGRAM(s) Oral every 4 hours PRN  polyethylene glycol 3350 17 Gram(s) Oral daily  senna 2 Tablet(s) Oral at bedtime  sodium chloride 0.9%. 1000 milliLiter(s) IV Continuous <Continuous>      EXAMINATION:  General: No acute distress  HEENT: Anicteric sclerae  Cardiac: [id number]  Lungs: Clear  Abdomen: Soft, non-tender, +BS  Extremities: No c/c/e  Skin/Incision Site: Clean, dry and intact  Neurologic: AOx3, EOMI, no facial, no drift, GODINEZ 5/5, SILT.

## 2025-04-15 NOTE — PHYSICAL THERAPY INITIAL EVALUATION ADULT - PERTINENT HX OF CURRENT PROBLEM, REHAB EVAL
Pt is a 56 y/o male admitted with seizure disorder. PMH: Type A aortic dissection s/p AV resuspension, ascending aorta and hemiarch replacement, CAD, CABG x 1 (5/2013), s/p Aortic Valve Replacement, s/p TEVAR (05/2023), PE (s/p IVC filter, subsequent removal by Dr. Ellison 9/13/23, not on AC), necrotizing pancreatitis s/p exp lap 5/31/23, Symptomatic Anemia, seizure disorder (2018-present). Pt presents with reported multiple seizure episodes per month, each occurrence lasting a few seconds, described as unresponsive, staring, drooling. not responding to medication.      Now s/p SEEG electrode placement (4/14). Pt is a 54 y/o male admitted with seizure disorder. PMH: Type A aortic dissection s/p AV resuspension, ascending aorta and hemiarch replacement, CAD, CABG x 1 (5/2013), s/p Aortic Valve Replacement, s/p TEVAR (05/2023), PE (s/p IVC filter, subsequent removal by Dr. Ellison 9/13/23, not on AC), necrotizing pancreatitis s/p exp lap 5/31/23, Symptomatic Anemia, seizure disorder (2018-present). Pt presents with reported multiple seizure episodes per month, each occurrence lasting a few seconds, described as unresponsive, staring, drooling. not responding to medication.     BLE ultrasound (-) for DVT.     Now s/p SEEG electrode placement (4/14).

## 2025-04-15 NOTE — PROGRESS NOTE ADULT - ASSESSMENT
55M, PMH for Type A aortic dissection s/p AV resuspension, ascending aorta and hemiarch replacement, CAD, CABG x 1 (5/2013), s/p Aortic Valve Replacement, s/p TEVAR (05/2023), PE (s/p IVC filter, subsequent removal by Dr. Ellison 9/13/23, not on AC), necrotizing pancreatitis s/p exp lap 5/31/23, Symptomatic Anemia, seizure disorder (2018-present), reports he has been experiencing multiple seizure episodes per month, each occurrence lasting a few seconds, described as unresponsive, staring, drooling. not responding to medication. Admitted for scheduled Stage 1 Right Stereo EEG for Implantation of Electrodes with Dr. Rowe on 04/14/2025.    Plan  [] Pending downgrade to EMU  [] After MRI Brain, discontinue briviact  [] Decrease vimpat to 150mg BID    Patient discussed with Dr. Lee. Note and plan not finalized until attending attestation and signature.  55M, PMH for Type A aortic dissection s/p AV resuspension, ascending aorta and hemiarch replacement, CAD, CABG x 1 (5/2013), s/p Aortic Valve Replacement, s/p TEVAR (05/2023), PE (s/p IVC filter, subsequent removal by Dr. Ellison 9/13/23, not on AC), necrotizing pancreatitis s/p exp lap 5/31/23, Symptomatic Anemia, seizure disorder (2018-present), reports he has been experiencing multiple seizure episodes per month, each occurrence lasting a few seconds, described as unresponsive, staring, drooling. not responding to medication. Admitted for scheduled Stage 1 Right Stereo EEG for Implantation of Electrodes with Dr. Rowe on 04/14/2025.    Plan  [] Pending downgrade to EMU  [] After MRI Brain, discontinue briviact  [] Decrease vimpat to 150mg BID  [] Rescues: first line ativan 2mg IV for GTC lasting >5min, second line vimpat 200mg IV    Patient discussed with Dr. Lee. Note and plan not finalized until attending attestation and signature.

## 2025-04-15 NOTE — EEG REPORT - NS EEG TEXT BOX
Start Date: 4/14/2025 – Day 1                  Start Time – 19:02 – 08:00            Duration – 12:29    Artifacts:  electrodes not recording well at: RTi7,RTi11    Non-Epileptiform Findings:   Focal delta at: RDa9-11, RTi9-10, RTP5-7, RTx9-11  Physiologic appearing beta/alpha at:    Interictal abnormalities:  -Frequent spikes, focal, maximum RDa1-3 / RDh1    Ictal abnormalities:   None    AEDs:  LCM 300mg BID  BRV 100mg BID      Keegan Lee MD  Neurology Attending Physician

## 2025-04-15 NOTE — DIETITIAN INITIAL EVALUATION ADULT - ADD RECOMMEND
1. continue current diet as tolerated of: regular diet  2. encourage PO intake, protein source with each meal as tolerated  3. recommend addition of Ensure Plus HP 2x/day for extra caloric/protein support  4. monitor PO intake, weight trend, electrolytes, blood glucose levels, labs, BMs

## 2025-04-15 NOTE — DIETITIAN INITIAL EVALUATION ADULT - ENERGY INTAKE
Patient endorses strong appetite and overall eating well during admission, making his own meal selections on [employer], states he can feed himself independently. Patient reports hard chewables are partially uncomfortable 2/2 recent surgery (named apples he previously ordered), almost all other foods reported as being taken well w/ no issues. Informed patient of diet texture modifications that can be made upon request, patient w/ understanding and wanting to continue to make his own food choices. Adequate (%)

## 2025-04-15 NOTE — PROGRESS NOTE ADULT - SUBJECTIVE AND OBJECTIVE BOX
Neurology Progress Note    SUBJECTIVE/OBJECTIVE/INTERVAL EVENTS: Patient seen and examined at bedside w/ neuro attending and team.     INTERVAL HISTORY: S/p sEEG implantation 4/14, to be downgraded to EMU today after MRI.     REVIEW OF SYSTEMS: Otherwise denies fever, chills, headaches, vision changes, nausea, vomiting, hearing change, focal weakness, focal numbness, bowel/ bladder incontinence. Few questions of a 10-system ROS was performed and is negative except for those items noted above and/or in the HPI.    VITALS & EXAMINATION:  Vital Signs Last 24 Hrs  T(C): 37.4 (15 Apr 2025 11:00), Max: 37.4 (15 Apr 2025 11:00)  T(F): 99.3 (15 Apr 2025 11:00), Max: 99.3 (15 Apr 2025 11:00)  HR: 72 (15 Apr 2025 11:00) (54 - 73)  BP: --  BP(mean): --  RR: 20 (15 Apr 2025 11:00) (12 - 21)  SpO2: 100% (15 Apr 2025 11:00) (95% - 100%)    Parameters below as of 15 Apr 2025 11:00  Patient On (Oxygen Delivery Method): room air        Neurologic Exam:  Mental status - Awake, Alert, Oriented to person, place, and time. Speech fluent, repetition and naming intact. Follows simple and complex commands. Attention/concentration intact    Cranial nerves - PERRLA (4mm -> 3mm b/l), VFF, EOMI, face sensation (V1-V3) intact b/l, facial strength intact without asymmetry b/l, hearing intact b/l, palate with symmetric elevation, trapezius 5/5 strength b/l, tongue midline on protrusion with full lateral movement    Motor - Normal bulk and tone throughout. No pronator drift.    Strength testing                              Right           Left  Should-Abduct       5                   5  Elbow-Flex             5                   5  Elbow-Ext              5                   5                        5                   5    Hip-Flex                 5                   5  Hip-Ext                  5                   5  Knee-Flex              5                   5  Knee-Ext                5                   5  Dorsiflex                5                   5  Plantarflex             5                   5    Sensation - Light touch intact throughout    Reflexes:                                             Right             Left  Triceps                     2+                2+  Biceps                      2+                2+  Brachioradialis          2+                2+  Patellar                    2+                 2+  Achilles                    2+                 2+  Plantar                   Down            Down    Coordination - Finger to Nose intact b/l. No tremors appreciated    Gait and station - Normal casual gait. Romberg (-)    LABORATORY:  CBC                       8.5    9.70  )-----------( 215      ( 15 Apr 2025 00:51 )             25.4     Chem 04-15    141  |  105  |  17  ----------------------------<  142[H]  4.3   |  22  |  0.90    Ca    8.5      15 Apr 2025 00:51  Phos  5.4     04-15  Mg     2.0     04-15      LFTs   Coagulopathy   Lipid Panel   A1c   Cardiac enzymes     U/A Urinalysis Basic - ( 15 Apr 2025 00:51 )    Color: x / Appearance: x / SG: x / pH: x  Gluc: 142 mg/dL / Ketone: x  / Bili: x / Urobili: x   Blood: x / Protein: x / Nitrite: x   Leuk Esterase: x / RBC: x / WBC x   Sq Epi: x / Non Sq Epi: x / Bacteria: x      CSF  Immunological  Other    STUDIES & IMAGING: (EEG, CT, MR, U/S, TTE/BARBIE):    EEG 2/7/25  Abnormal EEG in the awake, drowsy and asleep states.   -Three focal seizures, right frontocentral region, last at 04:59   -Generalized background slowing, mild (PDR < 8.5 Hz)   ----------------------------------------  IMPRESSION/CLINICAL CORRELATE:   This is an abnormal EEG record.    Three recorded focal onset seizures from the right frontocentral reigon, consistent with a focal epilepsy.    MRI BRAIN W/WO: EPILEPSY PROTOCOL    IMPRESSION: Nonspecific patchy white matter changes. 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 No abnormal enhancement. Neuro Quant data available.

## 2025-04-15 NOTE — PROGRESS NOTE ADULT - ASSESSMENT
ASSESSMENT/PLAN:    NEURO:  - POD0 sEEG with insertion of 11 depth electrodes.   - vEEG.   - Briviact 100BID, Vimpat 300BID, AEDs per Epilepsy.  - Postop CTH w/o significant heme.   - Postop MRI noncon in AM.   Pain control: Tylenol, Oxy 5/10 PRN.  Activity: [] mobilize as tolerated [X] Bedrest [X] PT [X] OT [] PMNR    PULM:  SpO2 goal >92%.   - Incentive spirometry.     CV:  SBP goal 100-160.   - Continue home meds amlodipine 10 daily, Lipitor 20 daily, HCTZ 12.5 daily    RENAL:  Fluids: IV NS at 75cc/hr.    GI:  Diet: Regular pending dysphagia.  GI prophylaxis [X] not indicated [] PPI [] other:  Bowel regimen [] colace [X] senna [] other: Miralax    ENDO:   Goal euglycemia (-180)    HEME/ONC:  VTE prophylaxis: SCDs  BLE Dopp pending.    ID:  Postop Ancef    MISC:    SOCIAL/FAMILY:  [X] awaiting [] updated at bedside [] family meeting    CODE STATUS:  [X] Full Code [] DNR [] DNI [] Palliative/Comfort Care    DISPOSITION:  [X] ICU [] Stroke Unit [] Floor [] EMU [] RCU [] PCU ASSESSMENT/PLAN:    NEURO:  - POD1 sEEG with insertion of 11 depth electrodes on Right side.  - vEEG.   - Briviact 100BID, Vimpat 300BID, AEDs per Epilepsy.  - Postop CTH w/o significant heme.   - Postop MRI noncon this AM.   Pain control: Tylenol, Oxy 5/10 PRN.  Activity: [] mobilize as tolerated [X] Bedrest [X] PT [X] OT [] PMNR    PULM:  SpO2 goal >92%.   - Incentive spirometry.     CV:  SBP goal 100-160.   - Continue home meds amlodipine 10 daily, Lipitor 20 daily, HCTZ 12.5 daily    RENAL:  Fluids: IVL    GI:  Diet: Regular   GI prophylaxis [X] not indicated [] PPI [] other:  Bowel regimen [] colace [X] senna [] other: Miralax    ENDO:   Goal euglycemia (-180)    HEME/ONC:  VTE prophylaxis: SCDs, SQL  BLE Dopp no DVTs.    ID:  Postop Ancef completed.  - Monitor for fevers.    MISC:    SOCIAL/FAMILY:  [X] awaiting [] updated at bedside [] family meeting    CODE STATUS:  [X] Full Code [] DNR [] DNI [] Palliative/Comfort Care    DISPOSITION:  [X] ICU [] Stroke Unit [] Floor [] EMU [] RCU [] PCU

## 2025-04-15 NOTE — DIETITIAN INITIAL EVALUATION ADULT - PERTINENT MEDS FT
MEDICATIONS  (STANDING):  amLODIPine   Tablet 10 milliGRAM(s) Oral daily  atorvastatin 20 milliGRAM(s) Oral at bedtime  brivaracetam 100 milliGRAM(s) Oral two times a day  chlorhexidine 4% Liquid 1 Application(s) Topical daily  hydrochlorothiazide 12.5 milliGRAM(s) Oral daily  lacosamide 300 milliGRAM(s) Oral two times a day  polyethylene glycol 3350 17 Gram(s) Oral daily  senna 2 Tablet(s) Oral at bedtime    MEDICATIONS  (PRN):  acetaminophen     Tablet .. 650 milliGRAM(s) Oral every 6 hours PRN Mild Pain (1 - 3)  bisacodyl 5 milliGRAM(s) Oral daily PRN Constipation  ondansetron Injectable 4 milliGRAM(s) IV Push every 6 hours PRN Nausea and/or Vomiting  oxyCODONE    IR 5 milliGRAM(s) Oral every 4 hours PRN Moderate Pain (4 - 6)  oxyCODONE    IR 10 milliGRAM(s) Oral every 4 hours PRN Severe Pain (7 - 10)

## 2025-04-15 NOTE — DIETITIAN INITIAL EVALUATION ADULT - ORAL INTAKE PTA/DIET HISTORY
Patient reports fluctuating PO intake/appetite in the recent past and by extension the past few years for several factors. Patient states due to needing multiple hospitalizations in years past, he has had continued unintentional weight loss at times. As of recent, patient states he is unable to ambulate more than one room in his house (also cites symptomatic anemia as a factor which he gets treatment for). Patient reports supportive family at home with his wife and daughter who help w/ food shopping and cooking. Patient states if he needs to be out for most of the day at appointments/other commitments, he finds it hard to eat consistently, hence his continued issues w/ weight management. No prior chewing/swallowing difficulty or issues w/ N/V PTA.

## 2025-04-15 NOTE — DIETITIAN INITIAL EVALUATION ADULT - ETIOLOGY
h/o multiple hospitalizations in the past w/ pt endorsing prolonged <PO/rizwan and ongoing unintentional wt loss

## 2025-04-15 NOTE — OCCUPATIONAL THERAPY INITIAL EVALUATION ADULT - ADDITIONAL COMMENTS
PTA pt reports living with wife in a PH+ 2 [place] + 1 flight inside+ tub shower. Pt reports being independent with all ADLs/mobility PTA no AD/DME.

## 2025-04-16 LAB
ANION GAP SERPL CALC-SCNC: 12 MMOL/L — SIGNIFICANT CHANGE UP (ref 5–17)
BUN SERPL-MCNC: 13 MG/DL — SIGNIFICANT CHANGE UP (ref 7–23)
CALCIUM SERPL-MCNC: 9.2 MG/DL — SIGNIFICANT CHANGE UP (ref 8.4–10.5)
CHLORIDE SERPL-SCNC: 104 MMOL/L — SIGNIFICANT CHANGE UP (ref 96–108)
CO2 SERPL-SCNC: 23 MMOL/L — SIGNIFICANT CHANGE UP (ref 22–31)
CREAT SERPL-MCNC: 0.93 MG/DL — SIGNIFICANT CHANGE UP (ref 0.5–1.3)
EGFR: 96 ML/MIN/1.73M2 — SIGNIFICANT CHANGE UP
EGFR: 96 ML/MIN/1.73M2 — SIGNIFICANT CHANGE UP
GLUCOSE SERPL-MCNC: 81 MG/DL — SIGNIFICANT CHANGE UP (ref 70–99)
HCT VFR BLD CALC: 27.2 % — LOW (ref 39–50)
HGB BLD-MCNC: 8.7 G/DL — LOW (ref 13–17)
MAGNESIUM SERPL-MCNC: 1.9 MG/DL — SIGNIFICANT CHANGE UP (ref 1.6–2.6)
MCHC RBC-ENTMCNC: 29.8 PG — SIGNIFICANT CHANGE UP (ref 27–34)
MCHC RBC-ENTMCNC: 32 G/DL — SIGNIFICANT CHANGE UP (ref 32–36)
MCV RBC AUTO: 93.2 FL — SIGNIFICANT CHANGE UP (ref 80–100)
NRBC BLD AUTO-RTO: 0 /100 WBCS — SIGNIFICANT CHANGE UP (ref 0–0)
PHOSPHATE SERPL-MCNC: 4.1 MG/DL — SIGNIFICANT CHANGE UP (ref 2.5–4.5)
PLATELET # BLD AUTO: 214 K/UL — SIGNIFICANT CHANGE UP (ref 150–400)
POTASSIUM SERPL-MCNC: 3.9 MMOL/L — SIGNIFICANT CHANGE UP (ref 3.5–5.3)
POTASSIUM SERPL-SCNC: 3.9 MMOL/L — SIGNIFICANT CHANGE UP (ref 3.5–5.3)
RBC # BLD: 2.92 M/UL — LOW (ref 4.2–5.8)
RBC # FLD: 21.5 % — HIGH (ref 10.3–14.5)
SODIUM SERPL-SCNC: 139 MMOL/L — SIGNIFICANT CHANGE UP (ref 135–145)
WBC # BLD: 7.74 K/UL — SIGNIFICANT CHANGE UP (ref 3.8–10.5)
WBC # FLD AUTO: 7.74 K/UL — SIGNIFICANT CHANGE UP (ref 3.8–10.5)

## 2025-04-16 PROCEDURE — 95720 EEG PHY/QHP EA INCR W/VEEG: CPT

## 2025-04-16 RX ORDER — HYDROMORPHONE/SOD CHLOR,ISO/PF 2 MG/10 ML
4 SYRINGE (ML) INJECTION EVERY 4 HOURS
Refills: 0 | Status: DISCONTINUED | OUTPATIENT
Start: 2025-04-16 | End: 2025-04-21

## 2025-04-16 RX ORDER — METHOCARBAMOL 500 MG/1
750 TABLET, FILM COATED ORAL EVERY 8 HOURS
Refills: 0 | Status: DISCONTINUED | OUTPATIENT
Start: 2025-04-16 | End: 2025-04-21

## 2025-04-16 RX ORDER — HYDROMORPHONE/SOD CHLOR,ISO/PF 2 MG/10 ML
2 SYRINGE (ML) INJECTION EVERY 4 HOURS
Refills: 0 | Status: DISCONTINUED | OUTPATIENT
Start: 2025-04-16 | End: 2025-04-21

## 2025-04-16 RX ORDER — HYDROMORPHONE/SOD CHLOR,ISO/PF 2 MG/10 ML
0.5 SYRINGE (ML) INJECTION EVERY 6 HOURS
Refills: 0 | Status: DISCONTINUED | OUTPATIENT
Start: 2025-04-16 | End: 2025-04-20

## 2025-04-16 RX ORDER — LACOSAMIDE 150 MG/1
100 TABLET, FILM COATED ORAL ONCE
Refills: 0 | Status: DISCONTINUED | OUTPATIENT
Start: 2025-04-16 | End: 2025-04-21

## 2025-04-16 RX ORDER — BISACODYL 5 MG
5 TABLET, DELAYED RELEASE (ENTERIC COATED) ORAL EVERY 12 HOURS
Refills: 0 | Status: DISCONTINUED | OUTPATIENT
Start: 2025-04-16 | End: 2025-04-18

## 2025-04-16 RX ORDER — RISPERIDONE 4 MG
0.5 TABLET ORAL EVERY 12 HOURS
Refills: 0 | Status: DISCONTINUED | OUTPATIENT
Start: 2025-04-16 | End: 2025-04-16

## 2025-04-16 RX ORDER — LORAZEPAM 4 MG/ML
1 VIAL (ML) INJECTION ONCE
Refills: 0 | Status: DISCONTINUED | OUTPATIENT
Start: 2025-04-16 | End: 2025-04-21

## 2025-04-16 RX ORDER — OXYCODONE HYDROCHLORIDE 30 MG/1
15 TABLET ORAL EVERY 4 HOURS
Refills: 0 | Status: DISCONTINUED | OUTPATIENT
Start: 2025-04-16 | End: 2025-04-16

## 2025-04-16 RX ORDER — OXYCODONE HYDROCHLORIDE 30 MG/1
10 TABLET ORAL EVERY 4 HOURS
Refills: 0 | Status: DISCONTINUED | OUTPATIENT
Start: 2025-04-16 | End: 2025-04-16

## 2025-04-16 RX ORDER — HYDROMORPHONE/SOD CHLOR,ISO/PF 2 MG/10 ML
0.5 SYRINGE (ML) INJECTION ONCE
Refills: 0 | Status: DISCONTINUED | OUTPATIENT
Start: 2025-04-16 | End: 2025-04-16

## 2025-04-16 RX ADMIN — Medication 0.5 MILLIGRAM(S): at 11:34

## 2025-04-16 RX ADMIN — AMLODIPINE BESYLATE 10 MILLIGRAM(S): 10 TABLET ORAL at 05:56

## 2025-04-16 RX ADMIN — Medication 400 MILLIGRAM(S): at 20:33

## 2025-04-16 RX ADMIN — Medication 1000 MILLIGRAM(S): at 06:46

## 2025-04-16 RX ADMIN — Medication 400 MILLIGRAM(S): at 06:06

## 2025-04-16 RX ADMIN — POLYETHYLENE GLYCOL 3350 17 GRAM(S): 17 POWDER, FOR SOLUTION ORAL at 17:47

## 2025-04-16 RX ADMIN — Medication 4 MILLIGRAM(S): at 17:47

## 2025-04-16 RX ADMIN — Medication 4 MILLIGRAM(S): at 22:52

## 2025-04-16 RX ADMIN — Medication 3 MILLIGRAM(S): at 22:22

## 2025-04-16 RX ADMIN — OXYCODONE HYDROCHLORIDE 15 MILLIGRAM(S): 30 TABLET ORAL at 09:15

## 2025-04-16 RX ADMIN — Medication 4 MILLIGRAM(S): at 18:17

## 2025-04-16 RX ADMIN — ENOXAPARIN SODIUM 40 MILLIGRAM(S): 100 INJECTION SUBCUTANEOUS at 17:47

## 2025-04-16 RX ADMIN — OXYCODONE HYDROCHLORIDE 15 MILLIGRAM(S): 30 TABLET ORAL at 08:45

## 2025-04-16 RX ADMIN — ATORVASTATIN CALCIUM 20 MILLIGRAM(S): 80 TABLET, FILM COATED ORAL at 20:38

## 2025-04-16 RX ADMIN — Medication 4 MILLIGRAM(S): at 11:32

## 2025-04-16 RX ADMIN — METHOCARBAMOL 500 MILLIGRAM(S): 500 TABLET, FILM COATED ORAL at 05:56

## 2025-04-16 RX ADMIN — OXYCODONE HYDROCHLORIDE 10 MILLIGRAM(S): 30 TABLET ORAL at 05:05

## 2025-04-16 RX ADMIN — Medication 0.5 MILLIGRAM(S): at 17:47

## 2025-04-16 RX ADMIN — Medication 1000 MILLIGRAM(S): at 21:03

## 2025-04-16 RX ADMIN — LACOSAMIDE 150 MILLIGRAM(S): 150 TABLET, FILM COATED ORAL at 05:56

## 2025-04-16 RX ADMIN — METHOCARBAMOL 500 MILLIGRAM(S): 500 TABLET, FILM COATED ORAL at 13:55

## 2025-04-16 RX ADMIN — Medication 4 MILLIGRAM(S): at 22:22

## 2025-04-16 RX ADMIN — OXYCODONE HYDROCHLORIDE 10 MILLIGRAM(S): 30 TABLET ORAL at 04:32

## 2025-04-16 RX ADMIN — Medication 0.5 MILLIGRAM(S): at 11:04

## 2025-04-16 RX ADMIN — Medication 2 TABLET(S): at 20:38

## 2025-04-16 RX ADMIN — Medication 4 MILLIGRAM(S): at 18:40

## 2025-04-16 RX ADMIN — Medication 1000 MILLIGRAM(S): at 00:06

## 2025-04-16 NOTE — PROGRESS NOTE ADULT - ASSESSMENT
55M, PMH for Type A aortic dissection s/p AV resuspension, ascending aorta and hemiarch replacement, CAD, CABG x 1 (5/2013), s/p Aortic Valve Replacement, s/p TEVAR (05/2023), PE (s/p IVC filter, subsequent removal by Dr. Ellison 9/13/23, not on AC), necrotizing pancreatitis s/p exp lap 5/31/23, Symptomatic Anemia, seizure disorder (2018-present), reports he has been experiencing multiple seizure episodes per month, each occurrence lasting a few seconds, described as unresponsive, staring, drooling. not responding to medication. Admitted for scheduled Stage 1 Right Stereo EEG for Implantation of Electrodes with Dr. Rowe on 04/14/2025.    Plan  [] Pending downgrade to EMU  [] After MRI Brain, discontinue briviact  [] Discontinue vimpat   [] Rescues: first line ativan 2mg IV for GTC lasting >5min, second line vimpat 200mg IV    Patient discussed with Dr. Isaac. Note and plan not finalized until attending attestation and signature.

## 2025-04-16 NOTE — PROGRESS NOTE ADULT - SUBJECTIVE AND OBJECTIVE BOX
Neurology Progress Note    SUBJECTIVE/OBJECTIVE/INTERVAL EVENTS: Patient seen and examined at bedside w/ neuro attending and team. Reports feeling well. NO seizures captured.       MEDICATIONS  (STANDING):  amLODIPine   Tablet 10 milliGRAM(s) Oral daily  atorvastatin 20 milliGRAM(s) Oral at bedtime  bisacodyl 5 milliGRAM(s) Oral every 12 hours  enoxaparin Injectable 40 milliGRAM(s) SubCutaneous <User Schedule>  hydrochlorothiazide 12.5 milliGRAM(s) Oral daily  methocarbamol 500 milliGRAM(s) Oral every 8 hours  polyethylene glycol 3350 17 Gram(s) Oral two times a day  risperiDONE   Tablet 0.5 milliGRAM(s) Oral every 12 hours  senna 2 Tablet(s) Oral at bedtime    MEDICATIONS  (PRN):  acetaminophen     Tablet .. 650 milliGRAM(s) Oral every 6 hours PRN Mild Pain (1 - 3)  acetaminophen   IVPB .. 1000 milliGRAM(s) IV Intermittent every 6 hours PRN Temp greater or equal to 38C (100.4F), Mild Pain (1 - 3)  bisacodyl 5 milliGRAM(s) Oral daily PRN Constipation  HYDROmorphone   Tablet 2 milliGRAM(s) Oral every 4 hours PRN Moderate Pain (4 - 6)  HYDROmorphone   Tablet 4 milliGRAM(s) Oral every 4 hours PRN Severe Pain (7 - 10)  lacosamide Injectable 100 milliGRAM(s) IV Push once PRN 2nd line therapy, GTC lasting >5 mins refractory to ativan  LORazepam   Injectable 1 milliGRAM(s) IV Push once PRN GTC lasting >5 mins with vital sign changes  melatonin 3 milliGRAM(s) Oral at bedtime PRN Insomnia  ondansetron Injectable 4 milliGRAM(s) IV Push every 6 hours PRN Nausea and/or Vomiting      VITALS & EXAMINATION:  Vital Signs Last 24 Hrs  T(C): 37.1 (16 Apr 2025 12:11), Max: 37.1 (16 Apr 2025 12:11)  T(F): 98.8 (16 Apr 2025 12:11), Max: 98.8 (16 Apr 2025 12:11)  HR: 63 (16 Apr 2025 12:11) (56 - 63)  BP: 145/72 (16 Apr 2025 12:11) (145/72 - 164/81)  BP(mean): --  RR: 17 (16 Apr 2025 12:11) (17 - 20)  SpO2: 98% (16 Apr 2025 12:11) (96% - 99%)    Parameters below as of 16 Apr 2025 12:11  Patient On (Oxygen Delivery Method): room air        Mental status - Awake, Alert, Oriented to person, place, and time. Speech fluent, repetition and naming intact. Follows simple and complex commands. Attention/concentration intact    Cranial nerves - PERRLA (4mm -> 3mm b/l), VFF, EOMI, face sensation (V1-V3) intact b/l, facial strength intact without asymmetry b/l, hearing intact b/l, palate with symmetric elevation, trapezius 5/5 strength b/l, tongue midline on protrusion with full lateral movement    Motor - Normal bulk and tone throughout. No pronator drift.    Strength testing                              Right           Left  Should-Abduct       5                   5  Elbow-Flex             5                   5  Elbow-Ext              5                   5                        5                   5    Hip-Flex                 5                   5  Hip-Ext                  5                   5  Knee-Flex              5                   5  Knee-Ext                5                   5  Dorsiflex                5                   5  Plantarflex             5                   5    Sensation - Light touch intact throughout    Reflexes:                                             Right             Left  Triceps                     2+                2+  Biceps                      2+                2+  Brachioradialis          2+                2+  Patellar                    2+                 2+  Achilles                    2+                 2+  Plantar                   Down            Down    Coordination - Finger to Nose intact b/l. No tremors appreciated    Gait and station - Normal casual gait. Romberg (-)      LABORATORY:  CBC                       8.7    7.74  )-----------( 214      ( 16 Apr 2025 07:33 )             27.2     Chem 04-16    139  |  104  |  13  ----------------------------<  81  3.9   |  23  |  0.93    Ca    9.2      16 Apr 2025 07:31  Phos  4.1     04-16  Mg     1.9     04-16      LFTs   Coagulopathy   Lipid Panel   A1c   Cardiac enzymes     U/A Urinalysis Basic - ( 16 Apr 2025 07:31 )    Color: x / Appearance: x / SG: x / pH: x  Gluc: 81 mg/dL / Ketone: x  / Bili: x / Urobili: x   Blood: x / Protein: x / Nitrite: x   Leuk Esterase: x / RBC: x / WBC x   Sq Epi: x / Non Sq Epi: x / Bacteria: x      CSF  Immunological  Other    STUDIES & IMAGING: (EEG, CT, MR, U/S, TTE/BARBIE):

## 2025-04-16 NOTE — PROGRESS NOTE ADULT - SUBJECTIVE AND OBJECTIVE BOX
SUMMARY:  55M, PMH for Type A aortic dissection s/p AV resuspension, ascending aorta and hemiarch replacement, CAD, CABG x 1 (5/2013), s/p Aortic Valve Replacement, s/p TEVAR (05/2023), PE (s/p IVC filter, subsequent removal by Dr. Ellison 9/13/23, not on AC), necrotizing pancreatitis s/p exp lap 5/31/23, Symptomatic Anemia, seizure disorder (2018-present), reports he has been experiencing multiple seizure episodes per month, each occurrence lasting a few seconds, described as unresponsive, staring, drooling. not responding to medication. Admitted for scheduled Stage 1 Right Stereo EEG for Implantation of Electrodes with Dr. Rowe on 04/14/2025.    ICU COURSE:  4/14/25: admitted to NSCU s/p implantation of 11 sEEG depth electrodes.     REVIEW OF SYSTEMS: None other than stated in HPI    ALLERGIES: Allergies    No Known Allergies    Intolerances        Vital Signs Last 24 Hrs  T(C): 36.9 (16 Apr 2025 08:06), Max: 37.4 (15 Apr 2025 11:00)  T(F): 98.5 (16 Apr 2025 08:06), Max: 99.3 (15 Apr 2025 11:00)  HR: 61 (16 Apr 2025 08:06) (56 - 72)  BP: 161/80 (16 Apr 2025 08:06) (145/78 - 164/81)  BP(mean): --  RR: 20 (16 Apr 2025 08:06) (14 - 20)  SpO2: 97% (16 Apr 2025 08:06) (96% - 100%)    Parameters below as of 16 Apr 2025 08:06  Patient On (Oxygen Delivery Method): room air            acetaminophen     Tablet .. 650 milliGRAM(s) Oral every 6 hours PRN  amLODIPine   Tablet 10 milliGRAM(s) Oral daily  atorvastatin 20 milliGRAM(s) Oral at bedtime  bisacodyl 5 milliGRAM(s) Oral daily PRN  brivaracetam 100 milliGRAM(s) Oral two times a day  ceFAZolin   IVPB 2000 milliGRAM(s) IV Intermittent every 8 hours  chlorhexidine 4% Liquid 1 Application(s) Topical daily  dextrose 5%. 1000 milliLiter(s) IV Continuous <Continuous>  dextrose 5%. 1000 milliLiter(s) IV Continuous <Continuous>  dextrose 50% Injectable 25 Gram(s) IV Push once  dextrose 50% Injectable 12.5 Gram(s) IV Push once  dextrose 50% Injectable 25 Gram(s) IV Push once  dextrose Oral Gel 15 Gram(s) Oral once PRN  glucagon  Injectable 1 milliGRAM(s) IntraMuscular once  hydrochlorothiazide 12.5 milliGRAM(s) Oral daily  HYDROmorphone  Injectable 0.5 milliGRAM(s) IV Push once  insulin lispro (ADMELOG) corrective regimen sliding scale   SubCutaneous three times a day before meals  lacosamide 300 milliGRAM(s) Oral two times a day  ondansetron Injectable 4 milliGRAM(s) IV Push every 6 hours PRN  oxyCODONE    IR 5 milliGRAM(s) Oral every 4 hours PRN  oxyCODONE    IR 10 milliGRAM(s) Oral every 4 hours PRN  polyethylene glycol 3350 17 Gram(s) Oral daily  senna 2 Tablet(s) Oral at bedtime  sodium chloride 0.9%. 1000 milliLiter(s) IV Continuous <Continuous>      EXAMINATION:  General: No acute distress  HEENT: Anicteric sclerae  Cardiac: [id number]  Lungs: Clear  Abdomen: Soft, non-tender, +BS  Extremities: No c/c/e  Skin/Incision Site: Clean, dry and intact  Neurologic: AOx3, EOMI, no facial, trace RUE drift, GODINEZ 5/5, SILT.

## 2025-04-16 NOTE — PROGRESS NOTE ADULT - ASSESSMENT
ASSESSMENT/PLAN:    NEURO:  - POD2 sEEG with insertion of 11 depth electrodes on Right side.  - vEEG 4/15 right sided spikes  - Briviact 100BID, Vimpat 300BID, AEDs per Epilepsy.  - Postop CTH w/o significant heme.   - Postop MRI noncon this AM.   Pain control: Tylenol, Oxy incr. to 10 and 15  Activity: [] mobilize as tolerated [X] Bedrest [X] PT [X] OT [] PMNR    PULM:  SpO2 goal >92%.   - Incentive spirometry.     CV:  SBP goal 100-160.   - Continue home meds amlodipine 10 daily, Lipitor 20 daily, HCTZ 12.5 daily    RENAL:  Fluids: IVL    GI:  Diet: Regular   GI prophylaxis [X] not indicated [] PPI [] other:  Bowel regimen [] colace [X] senna [] other: Miralax, dulcolax added 10/16    ENDO:   Goal euglycemia (-180)    HEME/ONC:  VTE prophylaxis: SCDs, SQL  BLE Dopp no DVTs.    ID:  Postop Ancef completed.  - Monitor for fevers.    MISC:    SOCIAL/FAMILY:  [X] awaiting [] updated at bedside [] family meeting    CODE STATUS:  [X] Full Code [] DNR [] DNI [] Palliative/Comfort Care    DISPOSITION:  [] ICU [] Stroke Unit []x Floor [] EMU [] RCU [] PCU

## 2025-04-17 PROBLEM — G40.219 COMPLEX PARTIAL EPILEPSY WITH GENERALIZATION AND WITH INTRACTABLE EPILEPSY: Status: ACTIVE | Noted: 2025-04-17

## 2025-04-17 PROCEDURE — 99232 SBSQ HOSP IP/OBS MODERATE 35: CPT

## 2025-04-17 PROCEDURE — 95720 EEG PHY/QHP EA INCR W/VEEG: CPT

## 2025-04-17 RX ORDER — LACOSAMIDE 150 MG/1
50 TABLET, FILM COATED ORAL ONCE
Refills: 0 | Status: DISCONTINUED | OUTPATIENT
Start: 2025-04-17 | End: 2025-04-17

## 2025-04-17 RX ORDER — BRIVARACETAM 75 MG/1
100 TABLET, FILM COATED ORAL ONCE
Refills: 0 | Status: DISCONTINUED | OUTPATIENT
Start: 2025-04-17 | End: 2025-04-17

## 2025-04-17 RX ADMIN — Medication 3 MILLIGRAM(S): at 05:35

## 2025-04-17 RX ADMIN — Medication 4 MILLIGRAM(S): at 22:02

## 2025-04-17 RX ADMIN — Medication 0.5 MILLIGRAM(S): at 21:22

## 2025-04-17 RX ADMIN — METHOCARBAMOL 750 MILLIGRAM(S): 500 TABLET, FILM COATED ORAL at 21:57

## 2025-04-17 RX ADMIN — Medication 4 MILLIGRAM(S): at 14:39

## 2025-04-17 RX ADMIN — Medication 4 MILLIGRAM(S): at 21:57

## 2025-04-17 RX ADMIN — METHOCARBAMOL 750 MILLIGRAM(S): 500 TABLET, FILM COATED ORAL at 13:27

## 2025-04-17 RX ADMIN — BRIVARACETAM 100 MILLIGRAM(S): 75 TABLET, FILM COATED ORAL at 13:28

## 2025-04-17 RX ADMIN — Medication 4 MILLIGRAM(S): at 17:50

## 2025-04-17 RX ADMIN — Medication 1000 MILLIGRAM(S): at 15:09

## 2025-04-17 RX ADMIN — Medication 4 MILLIGRAM(S): at 03:06

## 2025-04-17 RX ADMIN — Medication 4 MILLIGRAM(S): at 11:37

## 2025-04-17 RX ADMIN — Medication 0.5 MILLIGRAM(S): at 05:30

## 2025-04-17 RX ADMIN — AMLODIPINE BESYLATE 10 MILLIGRAM(S): 10 TABLET ORAL at 05:29

## 2025-04-17 RX ADMIN — Medication 650 MILLIGRAM(S): at 20:19

## 2025-04-17 RX ADMIN — Medication 4 MILLIGRAM(S): at 22:30

## 2025-04-17 RX ADMIN — ATORVASTATIN CALCIUM 20 MILLIGRAM(S): 80 TABLET, FILM COATED ORAL at 21:57

## 2025-04-17 RX ADMIN — Medication 0.5 MILLIGRAM(S): at 15:17

## 2025-04-17 RX ADMIN — ENOXAPARIN SODIUM 40 MILLIGRAM(S): 100 INJECTION SUBCUTANEOUS at 17:50

## 2025-04-17 RX ADMIN — Medication 5 MILLIGRAM(S): at 05:30

## 2025-04-17 RX ADMIN — METHOCARBAMOL 750 MILLIGRAM(S): 500 TABLET, FILM COATED ORAL at 05:26

## 2025-04-17 RX ADMIN — LACOSAMIDE 50 MILLIGRAM(S): 150 TABLET, FILM COATED ORAL at 11:31

## 2025-04-17 RX ADMIN — Medication 4 MILLIGRAM(S): at 18:20

## 2025-04-17 RX ADMIN — Medication 0.5 MILLIGRAM(S): at 20:52

## 2025-04-17 RX ADMIN — Medication 4 MILLIGRAM(S): at 11:07

## 2025-04-17 RX ADMIN — Medication 0.5 MILLIGRAM(S): at 14:47

## 2025-04-17 RX ADMIN — Medication 0.5 MILLIGRAM(S): at 06:00

## 2025-04-17 RX ADMIN — Medication 400 MILLIGRAM(S): at 14:39

## 2025-04-17 RX ADMIN — Medication 650 MILLIGRAM(S): at 19:44

## 2025-04-17 RX ADMIN — LACOSAMIDE 50 MILLIGRAM(S): 150 TABLET, FILM COATED ORAL at 10:54

## 2025-04-17 NOTE — PROGRESS NOTE ADULT - ASSESSMENT
Assessment and Plan:   · Assessment	  55M, PMH for Type A aortic dissection s/p AV resuspension, ascending aorta and hemiarch replacement, CAD, CABG x 1 (5/2013), s/p Aortic Valve Replacement, s/p TEVAR (05/2023), PE (s/p IVC filter, subsequent removal by Dr. Ellison 9/13/23, not on AC), necrotizing pancreatitis s/p exp lap 5/31/23, Symptomatic Anemia, seizure disorder (2018-present), reports he has been experiencing multiple seizure episodes per month, each occurrence lasting a few seconds, described as unresponsive, staring, drooling. not responding to medication. Admitted for scheduled Stage 1 Right Stereo EEG for Implantation of Electrodes with Dr. Rowe on 04/14/2025.    Patient administered Vimpat 50x2, had recurrent seizures after Vimpat administered Briviact 100mg x1  Continue monitoring off standing meds  Continue PRN Dilaudid for the headache  Follow up with neuro surgery for further recs  Continue Norvasc 10mg daily, HCTZ 12.5 daily and Lipitor  Rescues: first line ativan 2mg IV for GTC lasting >5min, second line vimpat 200mg IV      Patient discussed with Dr. Isaac. Note and plan not finalized until attending attestation and signature.

## 2025-04-17 NOTE — PROGRESS NOTE ADULT - SUBJECTIVE AND OBJECTIVE BOX
HPI:  56 y/o M with PMHx significant for Type A aortic dissection s/p AV resuspension, ascending aorta and hemiarch replacement, CAD, CABG x 1 (5/2013), s/p Aortic Valve Replacement, s/p TEVAR (05/2023), PE (s/p IVC filter, subsequent removal by Dr. Ellison 9/13/23, not on AC), necrotizing pancreatitis s/p exp lap 5/31/23, Symptomatic Anemia s/p multiple Blood Transfusions, 1 unit transfused last week, Seizure disorder (2018-present), reports he has been experiencing multiple seizure episodes per month, each occurrence lasting a few seconds, described as unresponsive, staring, drooling. not responding to medication. Patient is now scheduled for Stage 1 Right Stereo EEG for Implantation of Electrodes with Dr. Rowe on 04/14/2025. (11 Apr 2025 10:17)    SUBJECTIVE: Patient with two seizure episodes this AM, per patient the episodes are preceded by nausea. Patient also complaining of a headache.     acetaminophen     Tablet .. 650 milliGRAM(s) Oral every 6 hours PRN  amLODIPine   Tablet 10 milliGRAM(s) Oral daily  atorvastatin 20 milliGRAM(s) Oral at bedtime  bisacodyl 5 milliGRAM(s) Oral daily PRN  bisacodyl 5 milliGRAM(s) Oral every 12 hours  enoxaparin Injectable 40 milliGRAM(s) SubCutaneous <User Schedule>  hydrochlorothiazide 12.5 milliGRAM(s) Oral daily  HYDROmorphone   Tablet 2 milliGRAM(s) Oral every 4 hours PRN  HYDROmorphone   Tablet 4 milliGRAM(s) Oral every 4 hours PRN  HYDROmorphone  Injectable 0.5 milliGRAM(s) IV Push every 6 hours PRN  lacosamide Injectable 100 milliGRAM(s) IV Push once PRN  LORazepam   Injectable 1 milliGRAM(s) IV Push once PRN  melatonin 3 milliGRAM(s) Oral at bedtime PRN  methocarbamol 750 milliGRAM(s) Oral every 8 hours  ondansetron Injectable 4 milliGRAM(s) IV Push every 6 hours PRN  polyethylene glycol 3350 17 Gram(s) Oral two times a day  senna 2 Tablet(s) Oral at bedtime      Physical Exam: Neurological Exam:  	Mental Status: Orientated to self, date and place.  Attention intact.  No dysarthria, aphasia or neglect.  	Cranial Nerves: PERRL, EOMI, no nystagmus or diplopia. No facial asymmetry.  Hearing intact to finger rub bilaterally.  Tongue, uvula and palate midline.  Sternocleidomastoid and Trapezius intact bilaterally  	Motor: moving all 4 extremities equally w 5/5 strength  	Tone: normal.                  	Pronator drift: none                 	Dysmetria: None to finger-nose-finger  	No truncal ataxia.    	Tremor: No resting, postural or action tremor.  No myoclonus.  	Sensation: intact to light touch    LABS:                        8.7    7.74  )-----------( 214      ( 16 Apr 2025 07:33 )             27.2    04-16    139  |  104  |  13  ----------------------------<  81  3.9   |  23  |  0.93    Ca    9.2      16 Apr 2025 07:31  Phos  4.1     04-16  Mg     1.9     04-16             IMAGING:      MR Head No Cont (04.15.25 @ 12:39) >  IMPRESSION: Right-sided frontal and temporal deep EEG leads. No   hemorrhage or evidence of edema.    VA Duplex Lower Ext Vein Scan, Bilat (04.14.25 @ 19:22) >  IMPRESSION:  No evidence of deep venous thrombosis in either lower extremity.  Statement above in regards to the venous segments able to be   diagnostically examined as described.    CT Head No Cont (04.14.25 @ 14:42) >  IMPRESSION:    Interval placement of multiple right sided depth electrodes. Streak   artifact from electrodes markedly limits evaluation.    No hydrocephalus, midline shift, or effacement of the basal cisterns.    No gross evidence for the presence of large intracranialhemorrhage or   edema.

## 2025-04-17 NOTE — PROGRESS NOTE ADULT - SUBJECTIVE AND OBJECTIVE BOX
SUMMARY:  55M, PMH for Type A aortic dissection s/p AV resuspension, ascending aorta and hemiarch replacement, CAD, CABG x 1 (5/2013), s/p Aortic Valve Replacement, s/p TEVAR (05/2023), PE (s/p IVC filter, subsequent removal by Dr. Ellison 9/13/23, not on AC), necrotizing pancreatitis s/p exp lap 5/31/23, Symptomatic Anemia, seizure disorder (2018-present), reports he has been experiencing multiple seizure episodes per month, each occurrence lasting a few seconds, described as unresponsive, staring, drooling. not responding to medication. Admitted for scheduled Stage 1 Right Stereo EEG for Implantation of Electrodes with Dr. Rowe on 04/14/2025.    ICU COURSE:  4/14/25: admitted to NSCU s/p implantation of 11 sEEG depth electrodes.     REVIEW OF SYSTEMS: None other than stated in HPI    ALLERGIES: Allergies    No Known Allergies    Intolerances        Vital Signs Last 24 Hrs  T(C): 36.9 (17 Apr 2025 09:05), Max: 37.1 (16 Apr 2025 12:11)  T(F): 98.4 (17 Apr 2025 09:05), Max: 98.8 (16 Apr 2025 12:11)  HR: 123 (17 Apr 2025 11:25) (63 - 166)  BP: 149/111 (17 Apr 2025 11:25) (116/81 - 183/109)  BP(mean): 124 (17 Apr 2025 11:25) (93 - 134)  RR: 18 (17 Apr 2025 11:25) (17 - 22)  SpO2: 100% (17 Apr 2025 11:25) (66% - 100%)    Parameters below as of 17 Apr 2025 11:25  Patient On (Oxygen Delivery Method): room air            acetaminophen     Tablet .. 650 milliGRAM(s) Oral every 6 hours PRN  amLODIPine   Tablet 10 milliGRAM(s) Oral daily  atorvastatin 20 milliGRAM(s) Oral at bedtime  bisacodyl 5 milliGRAM(s) Oral daily PRN  brivaracetam 100 milliGRAM(s) Oral two times a day  ceFAZolin   IVPB 2000 milliGRAM(s) IV Intermittent every 8 hours  chlorhexidine 4% Liquid 1 Application(s) Topical daily  dextrose 5%. 1000 milliLiter(s) IV Continuous <Continuous>  dextrose 5%. 1000 milliLiter(s) IV Continuous <Continuous>  dextrose 50% Injectable 25 Gram(s) IV Push once  dextrose 50% Injectable 12.5 Gram(s) IV Push once  dextrose 50% Injectable 25 Gram(s) IV Push once  dextrose Oral Gel 15 Gram(s) Oral once PRN  glucagon  Injectable 1 milliGRAM(s) IntraMuscular once  hydrochlorothiazide 12.5 milliGRAM(s) Oral daily  HYDROmorphone  Injectable 0.5 milliGRAM(s) IV Push once  insulin lispro (ADMELOG) corrective regimen sliding scale   SubCutaneous three times a day before meals  lacosamide 300 milliGRAM(s) Oral two times a day  ondansetron Injectable 4 milliGRAM(s) IV Push every 6 hours PRN  oxyCODONE    IR 5 milliGRAM(s) Oral every 4 hours PRN  oxyCODONE    IR 10 milliGRAM(s) Oral every 4 hours PRN  polyethylene glycol 3350 17 Gram(s) Oral daily  senna 2 Tablet(s) Oral at bedtime  sodium chloride 0.9%. 1000 milliLiter(s) IV Continuous <Continuous>      EXAMINATION:  General: No acute distress  HEENT: Anicteric sclerae  Cardiac: [id number]  Lungs: Clear  Abdomen: Soft, non-tender, +BS  Extremities: No c/c/e  Skin/Incision Site: Clean, dry and intact  Neurologic: AOx3, EOMI, no facial, trace RUE drift, GODINEZ 5/5, SILT.

## 2025-04-17 NOTE — PROGRESS NOTE ADULT - ASSESSMENT
ASSESSMENT/PLAN: first seizure this am with standing meds on hold.    NEURO:  - POD3 sEEG with insertion of 11 depth electrodes on Right side.  - vEEG 4/15 right sided spikes  - Postop CTH w/o significant heme.   - Postop MRI noncon this AM.   Pain control: Tylenol, Oxy changed to dilaudid oral 4/16 with minor improvement    Activity: [] mobilize as tolerated [X] Bedrest [X] PT [X] OT [] PMNR    PULM:  SpO2 goal >92%.   - Incentive spirometry.     CV:  SBP goal 100-160.   - Continue home meds amlodipine 10 daily, Lipitor 20 daily, HCTZ 12.5 daily    RENAL:  Fluids: IVL    GI:  Diet: Regular   GI prophylaxis [X] not indicated [] PPI [] other:  Bowel regimen [] colace [X] senna [] other: Miralax, dulcolax added 10/16    ENDO:   Goal euglycemia (-180)    HEME/ONC:  VTE prophylaxis: SCDs, SQL  BLE Dopp no DVTs.    ID:  Postop Ancef completed.  - Monitor for fevers.    MISC:    SOCIAL/FAMILY:  [X] awaiting [] updated at bedside [] family meeting    CODE STATUS:  [X] Full Code [] DNR [] DNI [] Palliative/Comfort Care    DISPOSITION:  [] ICU [] Stroke Unit []x Floor [] EMU [] RCU [] PCU

## 2025-04-18 DIAGNOSIS — R01.1 CARDIAC MURMUR, UNSPECIFIED: ICD-10-CM

## 2025-04-18 DIAGNOSIS — R56.9 UNSPECIFIED CONVULSIONS: ICD-10-CM

## 2025-04-18 LAB
ANION GAP SERPL CALC-SCNC: 14 MMOL/L — SIGNIFICANT CHANGE UP (ref 5–17)
BASOPHILS # BLD AUTO: 0.05 K/UL — SIGNIFICANT CHANGE UP (ref 0–0.2)
BASOPHILS NFR BLD AUTO: 0.5 % — SIGNIFICANT CHANGE UP (ref 0–2)
BUN SERPL-MCNC: 16 MG/DL — SIGNIFICANT CHANGE UP (ref 7–23)
CALCIUM SERPL-MCNC: 9.4 MG/DL — SIGNIFICANT CHANGE UP (ref 8.4–10.5)
CHLORIDE SERPL-SCNC: 100 MMOL/L — SIGNIFICANT CHANGE UP (ref 96–108)
CO2 SERPL-SCNC: 23 MMOL/L — SIGNIFICANT CHANGE UP (ref 22–31)
CREAT SERPL-MCNC: 0.96 MG/DL — SIGNIFICANT CHANGE UP (ref 0.5–1.3)
EGFR: 93 ML/MIN/1.73M2 — SIGNIFICANT CHANGE UP
EGFR: 93 ML/MIN/1.73M2 — SIGNIFICANT CHANGE UP
EOSINOPHIL # BLD AUTO: 0.08 K/UL — SIGNIFICANT CHANGE UP (ref 0–0.5)
EOSINOPHIL NFR BLD AUTO: 0.8 % — SIGNIFICANT CHANGE UP (ref 0–6)
GLUCOSE SERPL-MCNC: 81 MG/DL — SIGNIFICANT CHANGE UP (ref 70–99)
HCT VFR BLD CALC: 30.4 % — LOW (ref 39–50)
HGB BLD-MCNC: 9.6 G/DL — LOW (ref 13–17)
IMM GRANULOCYTES NFR BLD AUTO: 0.9 % — SIGNIFICANT CHANGE UP (ref 0–0.9)
LYMPHOCYTES # BLD AUTO: 2.26 K/UL — SIGNIFICANT CHANGE UP (ref 1–3.3)
LYMPHOCYTES # BLD AUTO: 23.8 % — SIGNIFICANT CHANGE UP (ref 13–44)
MAGNESIUM SERPL-MCNC: 2 MG/DL — SIGNIFICANT CHANGE UP (ref 1.6–2.6)
MCHC RBC-ENTMCNC: 28.6 PG — SIGNIFICANT CHANGE UP (ref 27–34)
MCHC RBC-ENTMCNC: 31.6 G/DL — LOW (ref 32–36)
MCV RBC AUTO: 90.5 FL — SIGNIFICANT CHANGE UP (ref 80–100)
MONOCYTES # BLD AUTO: 1.28 K/UL — HIGH (ref 0–0.9)
MONOCYTES NFR BLD AUTO: 13.5 % — SIGNIFICANT CHANGE UP (ref 2–14)
NEUTROPHILS # BLD AUTO: 5.75 K/UL — SIGNIFICANT CHANGE UP (ref 1.8–7.4)
NEUTROPHILS NFR BLD AUTO: 60.5 % — SIGNIFICANT CHANGE UP (ref 43–77)
NRBC BLD AUTO-RTO: 0 /100 WBCS — SIGNIFICANT CHANGE UP (ref 0–0)
PHOSPHATE SERPL-MCNC: 4 MG/DL — SIGNIFICANT CHANGE UP (ref 2.5–4.5)
PLATELET # BLD AUTO: 235 K/UL — SIGNIFICANT CHANGE UP (ref 150–400)
POTASSIUM SERPL-MCNC: 3.6 MMOL/L — SIGNIFICANT CHANGE UP (ref 3.5–5.3)
POTASSIUM SERPL-SCNC: 3.6 MMOL/L — SIGNIFICANT CHANGE UP (ref 3.5–5.3)
RBC # BLD: 3.36 M/UL — LOW (ref 4.2–5.8)
RBC # FLD: 21.6 % — HIGH (ref 10.3–14.5)
SODIUM SERPL-SCNC: 137 MMOL/L — SIGNIFICANT CHANGE UP (ref 135–145)
WBC # BLD: 9.51 K/UL — SIGNIFICANT CHANGE UP (ref 3.8–10.5)
WBC # FLD AUTO: 9.51 K/UL — SIGNIFICANT CHANGE UP (ref 3.8–10.5)

## 2025-04-18 PROCEDURE — 93010 ELECTROCARDIOGRAM REPORT: CPT

## 2025-04-18 PROCEDURE — 99223 1ST HOSP IP/OBS HIGH 75: CPT

## 2025-04-18 PROCEDURE — 95720 EEG PHY/QHP EA INCR W/VEEG: CPT

## 2025-04-18 RX ORDER — BISACODYL 5 MG
5 TABLET, DELAYED RELEASE (ENTERIC COATED) ORAL EVERY 12 HOURS
Refills: 0 | Status: DISCONTINUED | OUTPATIENT
Start: 2025-04-18 | End: 2025-04-18

## 2025-04-18 RX ORDER — LACOSAMIDE 150 MG/1
200 TABLET, FILM COATED ORAL
Refills: 0 | Status: DISCONTINUED | OUTPATIENT
Start: 2025-04-18 | End: 2025-04-21

## 2025-04-18 RX ORDER — ACETAMINOPHEN 500 MG/5ML
1000 LIQUID (ML) ORAL EVERY 6 HOURS
Refills: 0 | Status: COMPLETED | OUTPATIENT
Start: 2025-04-18 | End: 2025-04-18

## 2025-04-18 RX ORDER — LACOSAMIDE 150 MG/1
200 TABLET, FILM COATED ORAL EVERY 12 HOURS
Refills: 0 | Status: DISCONTINUED | OUTPATIENT
Start: 2025-04-18 | End: 2025-04-18

## 2025-04-18 RX ORDER — LACOSAMIDE 150 MG/1
100 TABLET, FILM COATED ORAL ONCE
Refills: 0 | Status: DISCONTINUED | OUTPATIENT
Start: 2025-04-18 | End: 2025-04-18

## 2025-04-18 RX ORDER — BRIVARACETAM 75 MG/1
100 TABLET, FILM COATED ORAL EVERY 12 HOURS
Refills: 0 | Status: DISCONTINUED | OUTPATIENT
Start: 2025-04-18 | End: 2025-04-21

## 2025-04-18 RX ADMIN — Medication 400 MILLIGRAM(S): at 13:13

## 2025-04-18 RX ADMIN — BRIVARACETAM 240 MILLIGRAM(S): 75 TABLET, FILM COATED ORAL at 19:44

## 2025-04-18 RX ADMIN — METHOCARBAMOL 750 MILLIGRAM(S): 500 TABLET, FILM COATED ORAL at 06:11

## 2025-04-18 RX ADMIN — Medication 4 MILLIGRAM(S): at 23:23

## 2025-04-18 RX ADMIN — Medication 1000 MILLIGRAM(S): at 13:43

## 2025-04-18 RX ADMIN — Medication 4 MILLIGRAM(S): at 19:15

## 2025-04-18 RX ADMIN — ENOXAPARIN SODIUM 40 MILLIGRAM(S): 100 INJECTION SUBCUTANEOUS at 18:11

## 2025-04-18 RX ADMIN — AMLODIPINE BESYLATE 10 MILLIGRAM(S): 10 TABLET ORAL at 06:08

## 2025-04-18 RX ADMIN — Medication 5 MILLIGRAM(S): at 06:10

## 2025-04-18 RX ADMIN — LACOSAMIDE 140 MILLIGRAM(S): 150 TABLET, FILM COATED ORAL at 21:02

## 2025-04-18 RX ADMIN — METHOCARBAMOL 750 MILLIGRAM(S): 500 TABLET, FILM COATED ORAL at 13:07

## 2025-04-18 RX ADMIN — Medication 4 MILLIGRAM(S): at 18:45

## 2025-04-18 RX ADMIN — METHOCARBAMOL 750 MILLIGRAM(S): 500 TABLET, FILM COATED ORAL at 21:02

## 2025-04-18 RX ADMIN — Medication 400 MILLIGRAM(S): at 01:20

## 2025-04-18 RX ADMIN — LACOSAMIDE 100 MILLIGRAM(S): 150 TABLET, FILM COATED ORAL at 17:47

## 2025-04-18 RX ADMIN — Medication 4 MILLIGRAM(S): at 04:15

## 2025-04-18 RX ADMIN — ATORVASTATIN CALCIUM 20 MILLIGRAM(S): 80 TABLET, FILM COATED ORAL at 21:02

## 2025-04-18 RX ADMIN — Medication 4 MILLIGRAM(S): at 13:09

## 2025-04-18 RX ADMIN — Medication 1000 MILLIGRAM(S): at 01:50

## 2025-04-18 RX ADMIN — Medication 4 MILLIGRAM(S): at 03:46

## 2025-04-18 RX ADMIN — Medication 40 MILLIEQUIVALENT(S): at 13:07

## 2025-04-18 NOTE — PROGRESS NOTE ADULT - ASSESSMENT
56 y/o M with PMHx significant for Type A aortic dissection s/p AV resuspension, ascending aorta and hemiarch replacement, CAD, CABG x 1 (5/2013), s/p Aortic Valve Replacement, s/p TEVAR (05/2023), PE (s/p IVC filter, subsequent removal by Dr. Ellison 9/13/23, not on AC), necrotizing pancreatitis s/p exp lap 5/31/23, Symptomatic Anemia s/p multiple Blood Transfusions, 1 unit transfused last week, Seizure disorder (2018-present), reports he has been experiencing multiple seizure episodes per month, each occurrence lasting a few seconds, described as unresponsive, staring, drooling. not responding to medication. Patient is now scheduled for Stage 1 Right Stereo EEG for Implantation of Electrodes with Dr. Rowe on 04/14/2025. (11 Apr 2025 10:17)    Procedure: s/p Insertion of Rt SEEG electrode lead 4/14    first 3 GTC seizures yesterday, nothing overnight with standing meds on hold.    NEURO:  - POD4 sEEG with insertion of 11 depth electrodes on Right side.  - vEEG 4/15 right sided spikes  - Postop CTH w/o significant heme.   - Postop MRI noncon this AM.   -Pain control: Tylenol, Oxy changed to dilaudid oral 4/16 with minor improvement  -c/w first line ativan 2mg IV for GTC lasting >5min, second line vimpat 200mg IV  -Activity: as tolerated [X] Bedrest [X] PT [X] OT [] PMNR    PULM:  SpO2 goal >94%.   - Incentive spirometry.     CV:  Hemodynamically stable   - Continue home meds amlodipine 10 daily, Lipitor 20 daily, HCTZ 12.5 daily    GI:  Diet: Regular   GI prophylaxis [X] not indicated [] PPI [] other:  Bowel regimen [] colace [X] senna [x] other: Miralax, dulcolax added 10/16    RENAL:  Fluids: IVL  voiding  replete electrolytes as needed    ENDO:   Goal euglycemia (-180)    HEME/ONC:  VTE prophylaxis: SCDs, SQL  BLE Dopp no DVTs.    ID:  Postop Ancef completed.  - Monitor for fevers.    Await further sz , possible leads removal on Monday  Will f/u with Neurology    Will discuss with Dr Rowe  Available on teams

## 2025-04-18 NOTE — CONSULT NOTE ADULT - PROBLEM SELECTOR RECOMMENDATION 3
-c/w home amlodipine 10mg, would recommend holding HCTZ pre and venu-operatively, pt states he is not taking much PO.

## 2025-04-18 NOTE — PROGRESS NOTE ADULT - SUBJECTIVE AND OBJECTIVE BOX
56 y/o M with PMHx significant for Type A aortic dissection s/p AV resuspension, ascending aorta and hemiarch replacement, CAD, CABG x 1 (5/2013), s/p Aortic Valve Replacement, s/p TEVAR (05/2023), PE (s/p IVC filter, subsequent removal by Dr. Ellison 9/13/23, not on AC), necrotizing pancreatitis s/p exp lap 5/31/23, Symptomatic Anemia s/p multiple Blood Transfusions, 1 unit transfused last week, Seizure disorder (2018-present), reports he has been experiencing multiple seizure episodes per month, each occurrence lasting a few seconds, described as unresponsive, staring, drooling. not responding to medication. Patient is now scheduled for Stage 1 Right Stereo EEG for Implantation of Electrodes with Dr. Rowe on 04/14/2025. (11 Apr 2025 10:17)    Post-op day # 4 s/p Insertion of Rt SEEG electrode lead    Overnight event: no Sz overnight    Vital Signs Last 24 Hrs  T(C): 36.7 (18 Apr 2025 07:23), Max: 37.1 (17 Apr 2025 19:44)  T(F): 98 (18 Apr 2025 07:23), Max: 98.7 (17 Apr 2025 19:44)  HR: 83 (18 Apr 2025 07:23) (73 - 159)  BP: 125/78 (18 Apr 2025 07:23) (121/71 - 164/91)  BP(mean): 123 (17 Apr 2025 12:36) (120 - 123)  RR: 18 (18 Apr 2025 07:23) (18 - 19)  SpO2: 98% (18 Apr 2025 07:23) (94% - 100%)    Parameters below as of 18 Apr 2025 07:23  Patient On (Oxygen Delivery Method): room air                              9.6    9.51  )-----------( 235      ( 18 Apr 2025 07:10 )             30.4    04-18    137  |  100  |  16  ----------------------------<  81  3.6   |  23  |  0.96    Ca    9.4      18 Apr 2025 07:10  Phos  4.0     04-18  Mg     2.0     04-18       Stroke Core Measures      DRAIN OUTPUT:     NEUROIMAGING:     PHYSICAL EXAM:    General: No Acute Distress     Neurological: Awake, alert oriented to person, place and time, Following Commands, PERRL, EOMI, Face Symmetrical, Speech Fluent, Moving all extremities, Muscle Strength normal in all four extremities, No Drift, Sensation to Light Touch Intact    Pulmonary: Clear to Auscultation, No Rales, No Rhonchi, No Wheezes     Cardiovascular: S1, S2, Regular Rate and Rhythm     Gastrointestinal: Soft, Nontender, Nondistended     Incision: dressing in place    MEDICATIONS:   Antibiotics:    Neuro:  acetaminophen     Tablet .. 650 milliGRAM(s) Oral every 6 hours PRN Mild Pain (1 - 3)  acetaminophen   IVPB .. 1000 milliGRAM(s) IV Intermittent every 6 hours  HYDROmorphone   Tablet 2 milliGRAM(s) Oral every 4 hours PRN Moderate Pain (4 - 6)  HYDROmorphone   Tablet 4 milliGRAM(s) Oral every 4 hours PRN Severe Pain (7 - 10)  HYDROmorphone  Injectable 0.5 milliGRAM(s) IV Push every 6 hours PRN breakthrough pain  lacosamide Injectable 100 milliGRAM(s) IV Push once PRN 2nd line therapy, GTC lasting >5 mins refractory to ativan  LORazepam   Injectable 1 milliGRAM(s) IV Push once PRN GTC lasting >5 mins with vital sign changes  melatonin 3 milliGRAM(s) Oral at bedtime PRN Insomnia  methocarbamol 750 milliGRAM(s) Oral every 8 hours  ondansetron Injectable 4 milliGRAM(s) IV Push every 6 hours PRN Nausea and/or Vomiting    Anticoagulation:  enoxaparin Injectable 40 milliGRAM(s) SubCutaneous <User Schedule>    Cardiology:  amLODIPine   Tablet 10 milliGRAM(s) Oral daily  hydrochlorothiazide 12.5 milliGRAM(s) Oral daily    Endo:   atorvastatin 20 milliGRAM(s) Oral at bedtime    Pulm:    GI/:  bisacodyl 5 milliGRAM(s) Oral daily PRN  bisacodyl 5 milliGRAM(s) Oral every 12 hours  polyethylene glycol 3350 17 Gram(s) Oral two times a day  senna 2 Tablet(s) Oral at bedtime    Other:  bisacodyl 5 milliGRAM(s) Oral daily PRN  bisacodyl 5 milliGRAM(s) Oral every 12 hours  polyethylene glycol 3350 17 Gram(s) Oral two times a day  senna 2 Tablet(s) Oral at bedtime

## 2025-04-18 NOTE — PROVIDER CONTACT NOTE (OTHER) - ACTION/TREATMENT ORDERED:
DO Fatuma Leroy and EMU neuro team at bedside. Patient placed on nonrebreather for hypoxia. No medications given. Patient satting 99% on RA at end of episode. Patient having post-ictal drowsiness.
MD Tamiko Hurley made aware. Pt was started back on seizure medications. IVPB 100mg Briviact given. IVPB 200mg Vimpat given.
Neurosx LILIA Scanlon at bedside during episode. EMU Neuro team made aware of event. Patient 99% on RA at end of episode. No meds given. Patient drowsy but oriented after seizure.
LILIA Scanlon and LILIA Austin made aware. No interventions at this time.
LILIA Panda and LILIA Figueroa made aware. 100mg Vimpat ordered.
LILIA Scanlon and Dr. Isaac made aware and at bedside following event. Additional 50mg IVP vimpat given after seizure.

## 2025-04-18 NOTE — CHART NOTE - NSCHARTNOTEFT_GEN_A_CORE
NUTRITION FOLLOW UP NOTE    PATIENT SEEN FOR: malnutrition follow up    SOURCE: [] Patient  [x] Current Medical Record  [] RN  [] Family/support person at bedside  [] Patient unavailable/inappropriate  [] Other:    CHART REVIEWED/EVENTS NOTED.  [] No changes to nutrition care plan to note  [x] Nutrition Status:  - h/o type A aortic dissection (s/p AV resuspension, ascending aorta and hemiarch replacement), CAD (s/p CABGx1 5/2013), s/p AVR, s/p TEVAR (5/2023), h/o PE (s/p IVC filter, since removed 9/13/23), h/o necrotizing pancreatitis (s/p exp lap 5/31/23), symptomatic anemia (s/p multiple blood transfusions) per chart  - adm for planned sEEG implantation (performed 4/14)    DIET ORDER:   Diet, Regular:   Supplement Feeding Modality:  Oral  Ensure Plus High Protein Cans or Servings Per Day:  2       Frequency:  Daily (04-15-25)    CURRENT DIET ORDER IS:  [x] Appropriate:  [] Inadequate:  [] Other:    NUTRITION INTAKE/PROVISION:  [] PO:  [] Enteral Nutrition:  [] Parenteral Nutrition:    ANTHROPOMETRICS:  Drug Dosing Weight  Height (cm): 182.9 (14 Apr 2025 09:36)  Weight (kg): 75.7 (14 Apr 2025 09:36)  BMI (kg/m2): 22.6 (14 Apr 2025 09:36)  Weights:   4/14: 75.7kg    MEDICATIONS:  MEDICATIONS  (STANDING):  acetaminophen   IVPB .. 1000 milliGRAM(s) IV Intermittent every 6 hours  amLODIPine   Tablet 10 milliGRAM(s) Oral daily  atorvastatin 20 milliGRAM(s) Oral at bedtime  enoxaparin Injectable 40 milliGRAM(s) SubCutaneous <User Schedule>  hydrochlorothiazide 12.5 milliGRAM(s) Oral daily  methocarbamol 750 milliGRAM(s) Oral every 8 hours  polyethylene glycol 3350 17 Gram(s) Oral two times a day  potassium chloride    Tablet ER 40 milliEquivalent(s) Oral once  senna 2 Tablet(s) Oral at bedtime    MEDICATIONS  (PRN):  acetaminophen     Tablet .. 650 milliGRAM(s) Oral every 6 hours PRN Mild Pain (1 - 3)  bisacodyl 5 milliGRAM(s) Oral daily PRN Constipation  bisacodyl 5 milliGRAM(s) Oral every 12 hours PRN Constipation  HYDROmorphone   Tablet 2 milliGRAM(s) Oral every 4 hours PRN Moderate Pain (4 - 6)  HYDROmorphone   Tablet 4 milliGRAM(s) Oral every 4 hours PRN Severe Pain (7 - 10)  HYDROmorphone  Injectable 0.5 milliGRAM(s) IV Push every 6 hours PRN breakthrough pain  lacosamide Injectable 100 milliGRAM(s) IV Push once PRN 2nd line therapy, GTC lasting >5 mins refractory to ativan  LORazepam   Injectable 1 milliGRAM(s) IV Push once PRN GTC lasting >5 mins with vital sign changes  melatonin 3 milliGRAM(s) Oral at bedtime PRN Insomnia  ondansetron Injectable 4 milliGRAM(s) IV Push every 6 hours PRN Nausea and/or Vomiting    NUTRITIONALLY PERTINENT LABS:  04-18 Na137 mmol/L Glu 81 mg/dL K+ 3.6 mmol/L Cr  0.96 mg/dL BUN 16 mg/dL 04-18 Phos 4.0 mg/dL    NUTRITIONALLY PERTINENT MEDICATIONS/LABS:  [x] Reviewed  [x] Relevant notes on medications/labs:  - prior hyperphosphatemia 4/15, WNL as of 4/16    EDEMA:  [x] Reviewed  [] Relevant notes:    GI/ I&O:  [x] Reviewed  [x] Relevant notes:  - last BM 4/18, ordered for senna, miralax, dulcolax  [] Other:    SKIN:   [x] No pressure injuries documented, per nursing flowsheet  [] Pressure injury previously noted  [] Change in pressure injury documentation:  [] Other:    ESTIMATED NEEDS:  [x] No change:  [] Updated:  Energy: 2268-2646kcal/day (30-35kcal/kg)  Protein: 98-113g/day (1.3-1.5g/kg)  Fluid: ml/day or [x] defer to team  Based on: dosing BW 75.6kg    NUTRITION DIAGNOSIS:  [x] Prior Dx: chronic-severe protein calorie malnutrition  [] New Dx:    EDUCATION:  [] Yes:  [] Not appropriate/warranted    NUTRITION CARE PLAN:  1. Diet:  2. Supplements:  3. Multivitamin/mineral supplementation:    [] Achieved - Continue current nutrition intervention(s)  [] Current medical condition precludes nutrition intervention at this time.    MONITORING AND EVALUATION:   RD remains available upon request and will follow up per protocol.    Tanner Kuhn, WATSON, CDN - contact on TEAMS. NUTRITION FOLLOW UP NOTE    PATIENT SEEN FOR: malnutrition follow up    SOURCE: [x] Patient  [x] Current Medical Record  [] RN  [] Family/support person at bedside  [] Patient unavailable/inappropriate  [] Other:    CHART REVIEWED/EVENTS NOTED.  [] No changes to nutrition care plan to note  [x] Nutrition Status:  - h/o type A aortic dissection (s/p AV resuspension, ascending aorta and hemiarch replacement), CAD (s/p CABGx1 5/2013), s/p AVR, s/p TEVAR (5/2023), h/o PE (s/p IVC filter, since removed 9/13/23), h/o necrotizing pancreatitis (s/p exp lap 5/31/23), symptomatic anemia (s/p multiple blood transfusions) per chart  - adm for planned sEEG implantation (performed 4/14)    DIET ORDER:   Diet, Regular:   Supplement Feeding Modality:  Oral  Ensure Plus High Protein Cans or Servings Per Day:  2       Frequency:  Daily (04-15-25)    CURRENT DIET ORDER IS:  [x] Appropriate:  [] Inadequate:  [] Other:    NUTRITION INTAKE/PROVISION:  [x] PO:  - patient reports his PO intake/appetite has overall been poor, no chewing/swallowing difficulty reported, endorses intermittent nausea and antiemetics have been helping  - patient reports when meal trays come he is disinterested in eating with lack of drive for appetite/trying with his meals, has tried Ensure Plus HP as ordered requesting flavor change  - patient reports he wanted to request to the team for a regimen that could stimulate his appetite  [] Enteral Nutrition:  [] Parenteral Nutrition:    ANTHROPOMETRICS:  Drug Dosing Weight  Height (cm): 182.9 (14 Apr 2025 09:36)  Weight (kg): 75.7 (14 Apr 2025 09:36)  BMI (kg/m2): 22.6 (14 Apr 2025 09:36)  Weights:   4/14: 75.7kg  - request updated weight of patient when medically appropriate    MEDICATIONS:  MEDICATIONS  (STANDING):  acetaminophen   IVPB .. 1000 milliGRAM(s) IV Intermittent every 6 hours  amLODIPine   Tablet 10 milliGRAM(s) Oral daily  atorvastatin 20 milliGRAM(s) Oral at bedtime  enoxaparin Injectable 40 milliGRAM(s) SubCutaneous <User Schedule>  hydrochlorothiazide 12.5 milliGRAM(s) Oral daily  methocarbamol 750 milliGRAM(s) Oral every 8 hours  polyethylene glycol 3350 17 Gram(s) Oral two times a day  potassium chloride    Tablet ER 40 milliEquivalent(s) Oral once  senna 2 Tablet(s) Oral at bedtime    MEDICATIONS  (PRN):  acetaminophen     Tablet .. 650 milliGRAM(s) Oral every 6 hours PRN Mild Pain (1 - 3)  bisacodyl 5 milliGRAM(s) Oral daily PRN Constipation  bisacodyl 5 milliGRAM(s) Oral every 12 hours PRN Constipation  HYDROmorphone   Tablet 2 milliGRAM(s) Oral every 4 hours PRN Moderate Pain (4 - 6)  HYDROmorphone   Tablet 4 milliGRAM(s) Oral every 4 hours PRN Severe Pain (7 - 10)  HYDROmorphone  Injectable 0.5 milliGRAM(s) IV Push every 6 hours PRN breakthrough pain  lacosamide Injectable 100 milliGRAM(s) IV Push once PRN 2nd line therapy, GTC lasting >5 mins refractory to ativan  LORazepam   Injectable 1 milliGRAM(s) IV Push once PRN GTC lasting >5 mins with vital sign changes  melatonin 3 milliGRAM(s) Oral at bedtime PRN Insomnia  ondansetron Injectable 4 milliGRAM(s) IV Push every 6 hours PRN Nausea and/or Vomiting    NUTRITIONALLY PERTINENT LABS:  04-18 Na137 mmol/L Glu 81 mg/dL K+ 3.6 mmol/L Cr  0.96 mg/dL BUN 16 mg/dL 04-18 Phos 4.0 mg/dL    NUTRITIONALLY PERTINENT MEDICATIONS/LABS:  [x] Reviewed  [x] Relevant notes on medications/labs:  - prior hyperphosphatemia 4/15, WNL as of 4/16    EDEMA:  [x] Reviewed  [] Relevant notes:    GI/ I&O:  [x] Reviewed  [x] Relevant notes:  - last BM 4/18, ordered for senna, miralax, dulcolax  [] Other:    SKIN:   [x] No pressure injuries documented, per nursing flowsheet  [] Pressure injury previously noted  [] Change in pressure injury documentation:  [] Other:    ESTIMATED NEEDS:  [x] No change:  [] Updated:  Energy: 2268-2646kcal/day (30-35kcal/kg)  Protein: 98-113g/day (1.3-1.5g/kg)  Fluid: ml/day or [x] defer to team  Based on: dosing BW 75.6kg    NUTRITION DIAGNOSIS:  [x] Prior Dx: chronic-severe protein calorie malnutrition  [] New Dx:    EDUCATION:  [x] Yes: current diet order, food preferences, adequate caloric/protein intake, strategies to increase and maintain consistent PO intake, all questions were answered  [] Not appropriate/warranted    NUTRITION CARE PLAN:  1. Diet:  - continue current diet as tolerated of: regular diet  - intermittent nausea: antiemetics as ordered by team  - patient endorses continued poor appetite and not driven to eat with meals and requesting appetite stimulant: defer to team if this is medically appropriate to be trialed for the patient  2. Supplements:  - continue Ensure Plus HP BID for extra caloric/protein support    [] Achieved - Continue current nutrition intervention(s)  [] Current medical condition precludes nutrition intervention at this time.    MONITORING AND EVALUATION:   RD remains available upon request and will follow up per protocol.    Tanner Kuhn RD, CDN - contact on TEAMS.

## 2025-04-18 NOTE — CONSULT NOTE ADULT - ASSESSMENT
56 y/o M with PMHx significant for Type A aortic dissection s/p AV resuspension, ascending aorta and hemiarch replacement, CAD, CABG x 1 (5/2013), s/p Aortic Valve Replacement, s/p TEVAR (05/2023), PE (s/p IVC filter, subsequent removal by Dr. Ellison 9/13/23, not on AC), necrotizing pancreatitis s/p exp lap 5/31/23, Symptomatic Anemia s/p multiple Blood Transfusions, Seizure disorder (2018-present), presenting for recurring seizures s/p Stage 1 Right Stereo EEG for Implantation of Electrodes with Dr. Rowe on 04/14/2025. Course has subsequently complicated by persistent seizure activity

## 2025-04-18 NOTE — PROVIDER CONTACT NOTE (OTHER) - SITUATION
Patient having seizure episode with bilateral UE shaking and eye fluttering
Patient speaking with PCA when they dozed off mid-conversation. Patient began UE shaking with head turning towards L and L eye deviation. Patient received 50mg IVP vimpat 15 min prior to episode
Patient having another seizure episode of UE shaking, head and eye deviation to L. Patient became tachycardic and requiring non-rebreather for oxygenation
Patient had a 30 second episode of staring and unresponsiveness
Pt wife reported and 1:1 pressed button for pt seizure episode
Patient speaking fluently then stated that he was "feeling off." While talking with RN, he began to be unresponsive and staring. EEG button hit.

## 2025-04-18 NOTE — PROVIDER CONTACT NOTE (OTHER) - BACKGROUND
s/p sEEG placement 4/14
s/p sEEG 4/14, one previous episode an hour prior
s/p sEEG 4/14, patient had two previous episodes since 08:30AM
s/p sEEG 4/14. Patient has had three previous episodes today and has received 100mg IVP Vimpat.
S/p sEEG placement 4/14
s/p sEEG 4/14

## 2025-04-18 NOTE — PROVIDER CONTACT NOTE (OTHER) - ASSESSMENT
Patient having another seizure episode of UE shaking, head and eye deviation to L. Patient became tachycardic and requiring non-rebreather for oxygenation. See vital signs flowsheet. Episode lasted around 50 seconds before ending on its own without meds.
Patient began unresponsive and staring. Patient then had head turning towards L with L eye deviation. Patient had tongue bite and drooling requiring suction. See vital signs flowsheet.
Patient began UE shaking with head turning towards L and L eye deviation. See vital signs flowsheet. Episode lasted ~45 seconds.
Patient having UE shaking with eye fluttering. See vitals sign flowsheet. Whole episode lasting ~2 minutes
Patient had a 30 second episode of staring and unresponsiveness. See vitals signs flowsheet
Pt had approx. 4 min episode of starring and unresponsiveness. Pt started to come out of episode, and then had another episode of starring and unresponsiveness. See VS flowsheet.

## 2025-04-18 NOTE — PROGRESS NOTE ADULT - SUBJECTIVE AND OBJECTIVE BOX
THE PATIENT WAS SEEN AND EXAMINED BY ME WITH THE HOUSESTAFF DURING MORNING ROUNDS.     HPI:  54 y/o M with PMHx significant for Type A aortic dissection s/p AV resuspension, ascending aorta and hemiarch replacement, CAD, CABG x 1 (5/2013), s/p Aortic Valve Replacement, s/p TEVAR (05/2023), PE (s/p IVC filter, subsequent removal by Dr. Ellison 9/13/23, not on AC), necrotizing pancreatitis s/p exp lap 5/31/23, Symptomatic Anemia s/p multiple Blood Transfusions, 1 unit transfused last week, Seizure disorder (2018-present), reports he has been experiencing multiple seizure episodes per month, each occurrence lasting a few seconds, described as unresponsive, staring, drooling. not responding to medication. Patient is now scheduled for Stage 1 Right Stereo EEG for Implantation of Electrodes with Dr. Rowe on 04/14/2025. (11 Apr 2025 10:17)    SUBJECTIVE: Patient with two seizure episodes this AM, per patient the episodes are preceded by nausea. Patient also complaining of a headache.     acetaminophen     Tablet .. 650 milliGRAM(s) Oral every 6 hours PRN  acetaminophen   IVPB .. 1000 milliGRAM(s) IV Intermittent every 6 hours  amLODIPine   Tablet 10 milliGRAM(s) Oral daily  atorvastatin 20 milliGRAM(s) Oral at bedtime  bisacodyl 5 milliGRAM(s) Oral daily PRN  bisacodyl 5 milliGRAM(s) Oral every 12 hours  enoxaparin Injectable 40 milliGRAM(s) SubCutaneous <User Schedule>  hydrochlorothiazide 12.5 milliGRAM(s) Oral daily  HYDROmorphone   Tablet 2 milliGRAM(s) Oral every 4 hours PRN  HYDROmorphone   Tablet 4 milliGRAM(s) Oral every 4 hours PRN  HYDROmorphone  Injectable 0.5 milliGRAM(s) IV Push every 6 hours PRN  lacosamide Injectable 100 milliGRAM(s) IV Push once PRN  LORazepam   Injectable 1 milliGRAM(s) IV Push once PRN  melatonin 3 milliGRAM(s) Oral at bedtime PRN  methocarbamol 750 milliGRAM(s) Oral every 8 hours  ondansetron Injectable 4 milliGRAM(s) IV Push every 6 hours PRN  polyethylene glycol 3350 17 Gram(s) Oral two times a day  senna 2 Tablet(s) Oral at bedtime      Physical Exam: Neurological Exam:  	Mental Status: Orientated to self, date and place. Attention intact.  	Cranial Nerves: PERRL, EOMI, no nystagmus or diplopia. No facial asymmetry.  	Motor: moving all 4 extremities equally w 5/5 strength  	Tone: normal.         	Pronator drift: none                 	Tremor: No resting, postural or action tremor. No myoclonus.  	Sensation: intact to light touch    LABS:                        9.6    9.51  )-----------( 235      ( 18 Apr 2025 07:10 )             30.4    04-18    137  |  100  |  16  ----------------------------<  81  3.6   |  23  |  0.96    Ca    9.4      18 Apr 2025 07:10  Phos  4.0     04-18  Mg     2.0     04-18    IMAGING:     MR Head No Cont (04.15.25 @ 12:39)  Right-sided frontal and temporal deep EEG leads. No hemorrhage or evidence of edema.    VA Duplex Lower Ext Vein Scan, Bilat (04.14.25 @ 19:22) >  No evidence of deep venous thrombosis in either lower extremity.  Statement above in regards to the venous segments able to be diagnostically examined as described.    CT Head No Cont (04.14.25 @ 14:42) >  Interval placement of multiple right sided depth electrodes. Streak artifact from electrodes markedly limits evaluation.  No hydrocephalus, midline shift, or effacement of the basal cisterns.  No gross evidence for the presence of large intracranialhemorrhage or edema. THE PATIENT WAS SEEN AND EXAMINED BY ME WITH THE HOUSESTAFF DURING MORNING ROUNDS.     HPI:  56 y/o M with PMHx significant for Type A aortic dissection s/p AV resuspension, ascending aorta and hemiarch replacement, CAD, CABG x 1 (5/2013), s/p Aortic Valve Replacement, s/p TEVAR (05/2023), PE (s/p IVC filter, subsequent removal by Dr. Ellison 9/13/23, not on AC), necrotizing pancreatitis s/p exp lap 5/31/23, Symptomatic Anemia s/p multiple Blood Transfusions, 1 unit transfused last week, Seizure disorder (2018-present), reports he has been experiencing multiple seizure episodes per month, each occurrence lasting a few seconds, described as unresponsive, staring, drooling. not responding to medication. Patient is now scheduled for Stage 1 Right Stereo EEG for Implantation of Electrodes with Dr. Rowe on 04/14/2025. (11 Apr 2025 10:17)    SUBJECTIVE: Pt with aura overnight but no seizures. No new neurologic complaints.     acetaminophen     Tablet .. 650 milliGRAM(s) Oral every 6 hours PRN  acetaminophen   IVPB .. 1000 milliGRAM(s) IV Intermittent every 6 hours  amLODIPine   Tablet 10 milliGRAM(s) Oral daily  atorvastatin 20 milliGRAM(s) Oral at bedtime  bisacodyl 5 milliGRAM(s) Oral daily PRN  bisacodyl 5 milliGRAM(s) Oral every 12 hours  enoxaparin Injectable 40 milliGRAM(s) SubCutaneous <User Schedule>  hydrochlorothiazide 12.5 milliGRAM(s) Oral daily  HYDROmorphone   Tablet 2 milliGRAM(s) Oral every 4 hours PRN  HYDROmorphone   Tablet 4 milliGRAM(s) Oral every 4 hours PRN  HYDROmorphone  Injectable 0.5 milliGRAM(s) IV Push every 6 hours PRN  lacosamide Injectable 100 milliGRAM(s) IV Push once PRN  LORazepam   Injectable 1 milliGRAM(s) IV Push once PRN  melatonin 3 milliGRAM(s) Oral at bedtime PRN  methocarbamol 750 milliGRAM(s) Oral every 8 hours  ondansetron Injectable 4 milliGRAM(s) IV Push every 6 hours PRN  polyethylene glycol 3350 17 Gram(s) Oral two times a day  senna 2 Tablet(s) Oral at bedtime      Physical Exam: Neurological Exam:  	Mental Status: Oriented to self, date and place. Attention intact.  	Cranial Nerves: PERRL, EOMI, no nystagmus or diplopia. No facial asymmetry.  	Motor: moving all 4 extremities equally w 5/5 strength  	Tone: normal.         	Pronator drift: none                 	Tremor: No resting, postural or action tremor. No myoclonus.  	Sensation: intact to light touch    LABS:                        9.6    9.51  )-----------( 235      ( 18 Apr 2025 07:10 )             30.4    04-18    137  |  100  |  16  ----------------------------<  81  3.6   |  23  |  0.96    Ca    9.4      18 Apr 2025 07:10  Phos  4.0     04-18  Mg     2.0     04-18    IMAGING:     MR Head No Cont (04.15.25 @ 12:39)  Right-sided frontal and temporal deep EEG leads. No hemorrhage or evidence of edema.    VA Duplex Lower Ext Vein Scan, Bilat (04.14.25 @ 19:22) >  No evidence of deep venous thrombosis in either lower extremity.  Statement above in regards to the venous segments able to be diagnostically examined as described.    CT Head No Cont (04.14.25 @ 14:42) >  Interval placement of multiple right sided depth electrodes. Streak artifact from electrodes markedly limits evaluation.  No hydrocephalus, midline shift, or effacement of the basal cisterns.  No gross evidence for the presence of large intracranialhemorrhage or edema.

## 2025-04-18 NOTE — CONSULT NOTE ADULT - SUBJECTIVE AND OBJECTIVE BOX
DATE OF SERVICE: 04-18-25 @ 14:49    CHIEF COMPLAINT:Patient is a 55y old  Male who presents with a chief complaint of Intractable seizures (18 Apr 2025 11:57)      HISTORY OF PRESENT ILLNESS:HPI:  56 y/o M with PMHx significant for Type A aortic dissection s/p AV resuspension, ascending aorta and hemiarch replacement, CAD, CABG x 1 (5/2013), s/p Aortic Valve Replacement, s/p TEVAR (05/2023), PE (s/p IVC filter, subsequent removal by Dr. Ellison 9/13/23, not on AC), necrotizing pancreatitis s/p exp lap 5/31/23, Symptomatic Anemia s/p multiple Blood Transfusions, 1 unit transfused last week, Seizure disorder (2018-present), reports he has been experiencing multiple seizure episodes per month, each occurrence lasting a few seconds, described as unresponsive, staring, drooling. not responding to medication. Patient is now scheduled for Stage 1 Right Stereo EEG for Implantation of Electrodes with Dr. Rowe on 04/14/2025. (11 Apr 2025 10:17)      PAST MEDICAL & SURGICAL HISTORY:  HTN (hypertension)      Aortic dissection      CAD (coronary artery disease)      Seizure disorder      Hypertension      Status post endovascular aneurysm repair (EVAR)      Pancreatitis  hospitalized for 5 months per spouse      DVT, lower extremity      Pulmonary embolism      Anemia      Marijuana use      S/P aortic bifurcation bypass graft      S/P CABG x 1      Elective surgery  IVC filter      H/O exploratory laparotomy      H/O aortic valve repair      H/O aortic aneurysm repair              MEDICATIONS:  amLODIPine   Tablet 10 milliGRAM(s) Oral daily  enoxaparin Injectable 40 milliGRAM(s) SubCutaneous <User Schedule>  hydrochlorothiazide 12.5 milliGRAM(s) Oral daily        acetaminophen     Tablet .. 650 milliGRAM(s) Oral every 6 hours PRN  acetaminophen   IVPB .. 1000 milliGRAM(s) IV Intermittent every 6 hours  HYDROmorphone   Tablet 2 milliGRAM(s) Oral every 4 hours PRN  HYDROmorphone   Tablet 4 milliGRAM(s) Oral every 4 hours PRN  HYDROmorphone  Injectable 0.5 milliGRAM(s) IV Push every 6 hours PRN  lacosamide Injectable 100 milliGRAM(s) IV Push once PRN  LORazepam   Injectable 1 milliGRAM(s) IV Push once PRN  melatonin 3 milliGRAM(s) Oral at bedtime PRN  methocarbamol 750 milliGRAM(s) Oral every 8 hours  ondansetron Injectable 4 milliGRAM(s) IV Push every 6 hours PRN    bisacodyl 5 milliGRAM(s) Oral daily PRN  bisacodyl 5 milliGRAM(s) Oral every 12 hours PRN  polyethylene glycol 3350 17 Gram(s) Oral two times a day  senna 2 Tablet(s) Oral at bedtime    atorvastatin 20 milliGRAM(s) Oral at bedtime        FAMILY HISTORY:      Non-contributory    SOCIAL HISTORY:    [ ] Tobacco  [ ] Drugs  [ ] Alcohol    Allergies    No Known Allergies    Intolerances    	    REVIEW OF SYSTEMS:  CONSTITUTIONAL: No fever  EYES: No eye pain, visual disturbances, or discharge  ENMT:  No difficulty hearing, tinnitus  NECK: No pain or stiffness  RESPIRATORY: No cough, wheezing,  CARDIOVASCULAR: No chest pain, palpitations, passing out, dizziness, or leg swelling  GASTROINTESTINAL:  No nausea, vomiting, diarrhea or constipation. No melena.  GENITOURINARY: No dysuria, hematuria  NEUROLOGICAL: No stroke like symptoms  SKIN: No burning or lesions   ENDOCRINE: No heat or cold intolerance  MUSCULOSKELETAL: No joint pain or swelling  PSYCHIATRIC: No  anxiety, mood swings  HEME/LYMPH: No bleeding gums  ALLERGY AND IMMUNOLOGIC: No hives or eczema	    All other ROS negative    PHYSICAL EXAM:  T(C): 36.7 (04-18-25 @ 13:00), Max: 37.1 (04-17-25 @ 19:44)  HR: 69 (04-18-25 @ 13:00) (69 - 95)  BP: 121/70 (04-18-25 @ 13:00) (121/70 - 164/91)  RR: 18 (04-18-25 @ 13:00) (18 - 19)  SpO2: 98% (04-18-25 @ 13:00) (95% - 100%)  Wt(kg): --  I&O's Summary    17 Apr 2025 07:01  -  18 Apr 2025 07:00  --------------------------------------------------------  IN: 290 mL / OUT: 1195 mL / NET: -905 mL    18 Apr 2025 07:01  -  18 Apr 2025 14:49  --------------------------------------------------------  IN: 639 mL / OUT: 400 mL / NET: 239 mL        Appearance: Normal	  HEENT:   Normal oral mucosa, EOMI	  Cardiovascular:  S1 S2, No JVD,    Respiratory: Lungs clear to auscultation	  Psychiatry: Alert  Gastrointestinal:  Soft, Non-tender, + BS	  Skin: No rashes   Neurologic: Non-focal  Extremities:  No edema  Vascular: Peripheral pulses palpable    	    	  	  CARDIAC MARKERS:  Labs personally reviewed by me                                  9.6    9.51  )-----------( 235      ( 18 Apr 2025 07:10 )             30.4     04-18    137  |  100  |  16  ----------------------------<  81  3.6   |  23  |  0.96    Ca    9.4      18 Apr 2025 07:10  Phos  4.0     04-18  Mg     2.0     04-18            EKG: Personally reviewed by me -   Radiology: Personally reviewed by me -     < from: TTE Echo Complete w/o Contrast w/ Doppler (10.07.24 @ 11:54) >  CONCLUSIONS:     1. There is moderate concentric left ventricular hypertrophy. The left   ventricle is normal in size and systolic function with a calculated   ejection fraction of 65%.   2. Normal right ventricular size and systolic function.   3. Severely dilated left atrium.   4. A bioprosthetic valve noted in the aortic position. The peak   transvalvular velocity is 3.59 m/s, the mean transvalvular gradient is   25.00 mmHg, and the LVOT/AV velocity ratio is 0.49. The peak transaortic   gradient is 51.55 mmHg. There is no evidence of aortic regurgitation.   5. Chordal SHAJI present without LVOT obstruction. Probably moderate   mitral regurgitation.   6. Moderate tricuspid regurgitation.   7. Pulmonary hypertension present, pulmonary artery systolic pressure is   54 mmHg.   8. No pericardial effusion.   9. Compared to the previous TTE performed on 6/14/2023, the transaortic   gradients are higher on the current study. Apical aneurysm is not   appreciated on the current study.    < from: CT Head No Cont (04.14.25 @ 14:42) >  IMPRESSION:    Interval placement of multiple right sided depth electrodes. Streak   artifact from electrodes markedly limits evaluation.    No hydrocephalus, midline shift, or effacement of the basal cisterns.    No gross evidence for the presence of large intracranialhemorrhage or   edema.    < from: VA Duplex Lower Ext Vein Scan, Bilat (04.14.25 @ 19:22) >  IMPRESSION:  No evidence of deep venous thrombosis in either lower extremity.  Statement above in regards to the venous segments able to be   diagnostically examined as described.    < from: BARBIE Echo Doppler w/ Cont (10.08.24 @ 10:21) >    CONCLUSIONS:     1. Normal left and right ventricular size and systolic function.   2. No LA/RA/BRAD/RAA thrombus seen.   3. A bioprosthetic valve is noted in the aortic position which appears   to be functioning normally. There is no evidence of aortic regurgitation.   4. Graft material is seen in the ascending aorta / aortic arch.   5. No pericardial effusion.    Assessment /Plan:     56 y/o M with PMHx significant for Type A aortic dissection s/p AV resuspension, ascending aorta and hemiarch replacement, CAD, CABG x 1 (5/2013), s/p Aortic Valve Replacement, s/p TEVAR (05/2023), PE (s/p IVC filter, subsequent removal by Dr. Ellison 9/13/23, not on AC), necrotizing pancreatitis s/p exp lap 5/31/23, Symptomatic Anemia s/p multiple Blood Transfusions, 1 unit transfused last week, Seizure disorder (2018-present), reports he has been experiencing multiple seizure episodes per month, each occurrence lasting a few seconds, described as unresponsive, staring, drooling. not responding to medication. Patient is now scheduled for Stage 1 Right Stereo EEG for Implantation of Electrodes with Dr. Rowe on 04/14/2025.    Problem 1: Cardiac Risk Stratification   - EKG    Problem 2: CAD  - Hx CABG in 2023  - C/w ASA & Atorvastatin 20mg     Problem 3: Hx Aortic Dissection & Valve replacement  - TEVAR 3/3023  - TTE 10/2024 = LVEF 65%, mod concentric LVH, normal RV size and systolic function. severely dilated LA. Bioprosthetic valve in aortic position. no evidence of AR. Probable moderate MR. Moderate TR. pHTN, PASP 54    Problem 4: HTN  - C/w Amlodipine 10mg  - C/w HCTZ 12.5    Problem 5: Epilepsy  - Neurology and Neurosurgery following, appreciate recs  - s/p Insertion of Rt SEEG electrode lead 4/14    Problem 6: DVT prophylaxis  - Lovenox     Differential diagnosis and plan of care discussed with patient after the evaluation. Counseling on diet, nutritional counseling, weight management, exercise and medication compliance was done.   Advanced care planning/advanced directives discussed with patient/family. DNR status including forceful chest compressions to attempt to restart the heart, ventilator support/artificial breathing, electric shock, artificial nutrition, health care proxy, Molst form all discussed with pt. Pt wishes to consider. Sixteen minutes spent on discussing advanced directives.       MARCUS Hare, DO [facility]  Cardiovascular Medicine  [address] [place] [address] Dr, [address]  Available for call or text via [employer]  Office [phone]     DATE OF SERVICE: 04-18-25 @ 14:49    CHIEF COMPLAINT:Patient is a 55y old  Male who presents with a chief complaint of Intractable seizures (18 Apr 2025 11:57)      HISTORY OF PRESENT ILLNESS:HPI:  54 y/o M with PMHx significant for Type A aortic dissection s/p AV resuspension, ascending aorta and hemiarch replacement, CAD, CABG x 1 (5/2013), s/p Aortic Valve Replacement, s/p TEVAR (05/2023), PE (s/p IVC filter, subsequent removal by Dr. Ellison 9/13/23, not on AC), necrotizing pancreatitis s/p exp lap 5/31/23, Symptomatic Anemia s/p multiple Blood Transfusions, 1 unit transfused last week, Seizure disorder (2018-present), reports he has been experiencing multiple seizure episodes per month, each occurrence lasting a few seconds, described as unresponsive, staring, drooling. not responding to medication. Patient is now scheduled for Stage 1 Right Stereo EEG for Implantation of Electrodes with Dr. Rowe on 04/14/2025. (11 Apr 2025 10:17)      PAST MEDICAL & SURGICAL HISTORY:  HTN (hypertension)      Aortic dissection      CAD (coronary artery disease)      Seizure disorder      Hypertension      Status post endovascular aneurysm repair (EVAR)      Pancreatitis  hospitalized for 5 months per spouse      DVT, lower extremity      Pulmonary embolism      Anemia      Marijuana use      S/P aortic bifurcation bypass graft      S/P CABG x 1      Elective surgery  IVC filter      H/O exploratory laparotomy      H/O aortic valve repair      H/O aortic aneurysm repair              MEDICATIONS:  amLODIPine   Tablet 10 milliGRAM(s) Oral daily  enoxaparin Injectable 40 milliGRAM(s) SubCutaneous <User Schedule>  hydrochlorothiazide 12.5 milliGRAM(s) Oral daily        acetaminophen     Tablet .. 650 milliGRAM(s) Oral every 6 hours PRN  acetaminophen   IVPB .. 1000 milliGRAM(s) IV Intermittent every 6 hours  HYDROmorphone   Tablet 2 milliGRAM(s) Oral every 4 hours PRN  HYDROmorphone   Tablet 4 milliGRAM(s) Oral every 4 hours PRN  HYDROmorphone  Injectable 0.5 milliGRAM(s) IV Push every 6 hours PRN  lacosamide Injectable 100 milliGRAM(s) IV Push once PRN  LORazepam   Injectable 1 milliGRAM(s) IV Push once PRN  melatonin 3 milliGRAM(s) Oral at bedtime PRN  methocarbamol 750 milliGRAM(s) Oral every 8 hours  ondansetron Injectable 4 milliGRAM(s) IV Push every 6 hours PRN    bisacodyl 5 milliGRAM(s) Oral daily PRN  bisacodyl 5 milliGRAM(s) Oral every 12 hours PRN  polyethylene glycol 3350 17 Gram(s) Oral two times a day  senna 2 Tablet(s) Oral at bedtime    atorvastatin 20 milliGRAM(s) Oral at bedtime        FAMILY HISTORY:      Non-contributory    SOCIAL HISTORY:       Drugs - daily marijuana use    Allergies    No Known Allergies    Intolerances    	    REVIEW OF SYSTEMS:  CONSTITUTIONAL: No fever  EYES: No eye pain, visual disturbances, or discharge  ENMT:  No difficulty hearing, tinnitus  NECK: No pain or stiffness  RESPIRATORY: No cough, wheezing,  CARDIOVASCULAR: No chest pain, palpitations, passing out, dizziness, or leg swelling  GASTROINTESTINAL:  No nausea, vomiting, diarrhea or constipation. No melena.  GENITOURINARY: No dysuria, hematuria  NEUROLOGICAL: No stroke like symptoms  SKIN: No burning or lesions   ENDOCRINE: No heat or cold intolerance  MUSCULOSKELETAL: No joint pain or swelling  PSYCHIATRIC: No  anxiety, mood swings  HEME/LYMPH: No bleeding gums  ALLERGY AND IMMUNOLOGIC: No hives or eczema	    All other ROS negative    PHYSICAL EXAM:  T(C): 36.7 (04-18-25 @ 13:00), Max: 37.1 (04-17-25 @ 19:44)  HR: 69 (04-18-25 @ 13:00) (69 - 95)  BP: 121/70 (04-18-25 @ 13:00) (121/70 - 164/91)  RR: 18 (04-18-25 @ 13:00) (18 - 19)  SpO2: 98% (04-18-25 @ 13:00) (95% - 100%)  Wt(kg): --  I&O's Summary    17 Apr 2025 07:01  -  18 Apr 2025 07:00  --------------------------------------------------------  IN: 290 mL / OUT: 1195 mL / NET: -905 mL    18 Apr 2025 07:01  -  18 Apr 2025 14:49  --------------------------------------------------------  IN: 639 mL / OUT: 400 mL / NET: 239 mL        Appearance: Normal	  HEENT:   Normal oral mucosa, EOMI	  Cardiovascular:  S1 S2, No JVD,    Respiratory: Lungs clear to auscultation	  Psychiatry: Alert  Gastrointestinal:  Soft, Non-tender, + BS	  Skin: No rashes   Neurologic: Non-focal  Extremities:  No edema  Vascular: Peripheral pulses palpable    	    	  	  CARDIAC MARKERS:  Labs personally reviewed by me                                  9.6    9.51  )-----------( 235      ( 18 Apr 2025 07:10 )             30.4     04-18    137  |  100  |  16  ----------------------------<  81  3.6   |  23  |  0.96    Ca    9.4      18 Apr 2025 07:10  Phos  4.0     04-18  Mg     2.0     04-18            EKG: Personally reviewed by me - NSR 71 bpm  Radiology: Personally reviewed by me -     < from: TTE Echo Complete w/o Contrast w/ Doppler (10.07.24 @ 11:54) >  CONCLUSIONS:     1. There is moderate concentric left ventricular hypertrophy. The left   ventricle is normal in size and systolic function with a calculated   ejection fraction of 65%.   2. Normal right ventricular size and systolic function.   3. Severely dilated left atrium.   4. A bioprosthetic valve noted in the aortic position. The peak   transvalvular velocity is 3.59 m/s, the mean transvalvular gradient is   25.00 mmHg, and the LVOT/AV velocity ratio is 0.49. The peak transaortic   gradient is 51.55 mmHg. There is no evidence of aortic regurgitation.   5. Chordal SHAJI present without LVOT obstruction. Probably moderate   mitral regurgitation.   6. Moderate tricuspid regurgitation.   7. Pulmonary hypertension present, pulmonary artery systolic pressure is   54 mmHg.   8. No pericardial effusion.   9. Compared to the previous TTE performed on 6/14/2023, the transaortic   gradients are higher on the current study. Apical aneurysm is not   appreciated on the current study.    < from: CT Head No Cont (04.14.25 @ 14:42) >  IMPRESSION:    Interval placement of multiple right sided depth electrodes. Streak   artifact from electrodes markedly limits evaluation.    No hydrocephalus, midline shift, or effacement of the basal cisterns.    No gross evidence for the presence of large intracranialhemorrhage or   edema.    < from: VA Duplex Lower Ext Vein Scan, Bilat (04.14.25 @ 19:22) >  IMPRESSION:  No evidence of deep venous thrombosis in either lower extremity.  Statement above in regards to the venous segments able to be   diagnostically examined as described.    < from: BARBIE Echo Doppler w/ Cont (10.08.24 @ 10:21) >    CONCLUSIONS:     1. Normal left and right ventricular size and systolic function.   2. No LA/RA/BRAD/RAA thrombus seen.   3. A bioprosthetic valve is noted in the aortic position which appears   to be functioning normally. There is no evidence of aortic regurgitation.   4. Graft material is seen in the ascending aorta / aortic arch.   5. No pericardial effusion.    Assessment /Plan:     54 y/o M with PMHx significant for Type A aortic dissection s/p AV resuspension, ascending aorta and hemiarch replacement, CAD, CABG x 1 (5/2013), s/p Aortic Valve Replacement, s/p TEVAR (05/2023), PE (s/p IVC filter, subsequent removal by Dr. Ellison 9/13/23, not on AC), necrotizing pancreatitis s/p exp lap 5/31/23, Symptomatic Anemia s/p multiple Blood Transfusions, 1 unit transfused last week, Seizure disorder (2018-present), reports he has been experiencing multiple seizure episodes per month, each occurrence lasting a few seconds, described as unresponsive, staring, drooling. not responding to medication. Patient is now scheduled for Stage 1 Right Stereo EEG for Implantation of Electrodes with Dr. Rowe on 04/14/2025.    Problem 1: Cardiac Risk Stratification   - Pt with some fatigue on exertion  - No evidence of tachy-bailey arrythmias  - Reviewed TTE from 10/2024, repeat ordered  - EKG 4/18 - NSR 71 bpm  - Further risk stratification pending TTE    Problem 2: CAD  - Hx CABG in 2023  - C/w ASA & Atorvastatin 20mg     Problem 3: Hx Aortic Dissection & Valve replacement  - TEVAR 3/3023  - TTE 10/2024 = LVEF 65%, mod concentric LVH, normal RV size and systolic function. severely dilated LA. Bioprosthetic valve in aortic position. no evidence of AR. Probable moderate MR. Moderate TR. pHTN, PASP 54  - Pending repeat TTE    Problem 4: HTN  - Well controlled  - C/w Amlodipine 10mg  - C/w HCTZ 12.5    Problem 5: Epilepsy  - Neurology and Neurosurgery following, appreciate recs  - s/p Insertion of Rt SEEG electrode lead 4/14, pending explant electrodes    Problem 6: DVT prophylaxis  - Lovenox       Differential diagnosis and plan of care discussed with patient after the evaluation. Counseling on diet, nutritional counseling, weight management, exercise and medication compliance was done.   Advanced care planning/advanced directives discussed with patient/family. DNR status including forceful chest compressions to attempt to restart the heart, ventilator support/artificial breathing, electric shock, artificial nutrition, health care proxy, Molst form all discussed with pt. Pt wishes to consider. Sixteen minutes spent on discussing advanced directives.       MARCUS Hare,  [facility]  Cardiovascular Medicine  [address] [place] [address] Dr, [address]  Available for call or text via [employer]  Office [phone]     DATE OF SERVICE: 04-18-25 @ 14:49    CHIEF COMPLAINT:Patient is a 55y old  Male who presents with a chief complaint of Intractable seizures (18 Apr 2025 11:57)      HISTORY OF PRESENT ILLNESS:HPI:  54 y/o M with PMHx significant for Type A aortic dissection s/p AV resuspension, ascending aorta and hemiarch replacement, CAD, CABG x 1 (5/2013), s/p Aortic Valve Replacement, s/p TEVAR (05/2023), PE (s/p IVC filter, subsequent removal by Dr. Ellison 9/13/23, not on AC), necrotizing pancreatitis s/p exp lap 5/31/23, Symptomatic Anemia s/p multiple Blood Transfusions, 1 unit transfused last week, Seizure disorder (2018-present), reports he has been experiencing multiple seizure episodes per month, each occurrence lasting a few seconds, described as unresponsive, staring, drooling. not responding to medication. Patient is now scheduled for Stage 1 Right Stereo EEG for Implantation of Electrodes with Dr. Rowe on 04/14/2025. (11 Apr 2025 10:17)      PAST MEDICAL & SURGICAL HISTORY:  HTN (hypertension)      Aortic dissection      CAD (coronary artery disease)      Seizure disorder      Hypertension      Status post endovascular aneurysm repair (EVAR)      Pancreatitis  hospitalized for 5 months per spouse      DVT, lower extremity      Pulmonary embolism      Anemia      Marijuana use      S/P aortic bifurcation bypass graft      S/P CABG x 1      Elective surgery  IVC filter      H/O exploratory laparotomy      H/O aortic valve repair      H/O aortic aneurysm repair              MEDICATIONS:  amLODIPine   Tablet 10 milliGRAM(s) Oral daily  enoxaparin Injectable 40 milliGRAM(s) SubCutaneous <User Schedule>  hydrochlorothiazide 12.5 milliGRAM(s) Oral daily        acetaminophen     Tablet .. 650 milliGRAM(s) Oral every 6 hours PRN  acetaminophen   IVPB .. 1000 milliGRAM(s) IV Intermittent every 6 hours  HYDROmorphone   Tablet 2 milliGRAM(s) Oral every 4 hours PRN  HYDROmorphone   Tablet 4 milliGRAM(s) Oral every 4 hours PRN  HYDROmorphone  Injectable 0.5 milliGRAM(s) IV Push every 6 hours PRN  lacosamide Injectable 100 milliGRAM(s) IV Push once PRN  LORazepam   Injectable 1 milliGRAM(s) IV Push once PRN  melatonin 3 milliGRAM(s) Oral at bedtime PRN  methocarbamol 750 milliGRAM(s) Oral every 8 hours  ondansetron Injectable 4 milliGRAM(s) IV Push every 6 hours PRN    bisacodyl 5 milliGRAM(s) Oral daily PRN  bisacodyl 5 milliGRAM(s) Oral every 12 hours PRN  polyethylene glycol 3350 17 Gram(s) Oral two times a day  senna 2 Tablet(s) Oral at bedtime    atorvastatin 20 milliGRAM(s) Oral at bedtime        FAMILY HISTORY:      Non-contributory    SOCIAL HISTORY:       Drugs - daily marijuana use    Allergies    No Known Allergies    Intolerances    	    REVIEW OF SYSTEMS:  CONSTITUTIONAL: No fever  EYES: No eye pain, visual disturbances, or discharge  ENMT:  No difficulty hearing, tinnitus  NECK: No pain or stiffness  RESPIRATORY: No cough, wheezing,  CARDIOVASCULAR: No chest pain, palpitations, passing out, dizziness, or leg swelling  GASTROINTESTINAL:  No nausea, vomiting, diarrhea or constipation. No melena.  GENITOURINARY: No dysuria, hematuria  NEUROLOGICAL: No stroke like symptoms  SKIN: No burning or lesions   ENDOCRINE: No heat or cold intolerance  MUSCULOSKELETAL: No joint pain or swelling  PSYCHIATRIC: No  anxiety, mood swings  HEME/LYMPH: No bleeding gums  ALLERGY AND IMMUNOLOGIC: No hives or eczema	    All other ROS negative    PHYSICAL EXAM:  T(C): 36.7 (04-18-25 @ 13:00), Max: 37.1 (04-17-25 @ 19:44)  HR: 69 (04-18-25 @ 13:00) (69 - 95)  BP: 121/70 (04-18-25 @ 13:00) (121/70 - 164/91)  RR: 18 (04-18-25 @ 13:00) (18 - 19)  SpO2: 98% (04-18-25 @ 13:00) (95% - 100%)  Wt(kg): --  I&O's Summary    17 Apr 2025 07:01  -  18 Apr 2025 07:00  --------------------------------------------------------  IN: 290 mL / OUT: 1195 mL / NET: -905 mL    18 Apr 2025 07:01  -  18 Apr 2025 14:49  --------------------------------------------------------  IN: 639 mL / OUT: 400 mL / NET: 239 mL        Appearance: Normal	  HEENT:   Normal oral mucosa, EOMI	  Cardiovascular:  S1 S2, No JVD,    Respiratory: Lungs clear to auscultation	  Psychiatry: Alert  Gastrointestinal:  Soft, Non-tender, + BS	  Skin: No rashes   Neurologic: Non-focal  Extremities:  No edema  Vascular: Peripheral pulses palpable    	    	  	  CARDIAC MARKERS:  Labs personally reviewed by me                                  9.6    9.51  )-----------( 235      ( 18 Apr 2025 07:10 )             30.4     04-18    137  |  100  |  16  ----------------------------<  81  3.6   |  23  |  0.96    Ca    9.4      18 Apr 2025 07:10  Phos  4.0     04-18  Mg     2.0     04-18            EKG: Personally reviewed by me - NSR 71 bpm  Radiology: Personally reviewed by me -     < from: TTE Echo Complete w/o Contrast w/ Doppler (10.07.24 @ 11:54) >  CONCLUSIONS:     1. There is moderate concentric left ventricular hypertrophy. The left   ventricle is normal in size and systolic function with a calculated   ejection fraction of 65%.   2. Normal right ventricular size and systolic function.   3. Severely dilated left atrium.   4. A bioprosthetic valve noted in the aortic position. The peak   transvalvular velocity is 3.59 m/s, the mean transvalvular gradient is   25.00 mmHg, and the LVOT/AV velocity ratio is 0.49. The peak transaortic   gradient is 51.55 mmHg. There is no evidence of aortic regurgitation.   5. Chordal SHAJI present without LVOT obstruction. Probably moderate   mitral regurgitation.   6. Moderate tricuspid regurgitation.   7. Pulmonary hypertension present, pulmonary artery systolic pressure is   54 mmHg.   8. No pericardial effusion.   9. Compared to the previous TTE performed on 6/14/2023, the transaortic   gradients are higher on the current study. Apical aneurysm is not   appreciated on the current study.    < from: CT Head No Cont (04.14.25 @ 14:42) >  IMPRESSION:    Interval placement of multiple right sided depth electrodes. Streak   artifact from electrodes markedly limits evaluation.    No hydrocephalus, midline shift, or effacement of the basal cisterns.    No gross evidence for the presence of large intracranialhemorrhage or   edema.    < from: [facility] Duplex Lower Ext Vein Scan, Bilat (04.14.25 @ 19:22) >  IMPRESSION:  No evidence of deep venous thrombosis in either lower extremity.  Statement above in regards to the venous segments able to be   diagnostically examined as described.    < from: [facility] Echo Doppler w/ Cont (10.08.24 @ 10:21) >    CONCLUSIONS:     1. Normal left and right ventricular size and systolic function.   2. No LA/RA/BRAD/RAA thrombus seen.   3. A bioprosthetic valve is noted in the aortic position which appears   to be functioning normally. There is no evidence of aortic regurgitation.   4. Graft material is seen in the ascending aorta / aortic arch.   5. No pericardial effusion.    Assessment /Plan:     54 y/o M with PMHx significant for Type A aortic dissection s/p AV resuspension, ascending aorta and hemiarch replacement, CAD, CABG x 1 (5/2013), s/p Aortic Valve Replacement, s/p TEVAR (05/2023), PE (s/p IVC filter, subsequent removal by Dr. Ellison 9/13/23, not on AC), necrotizing pancreatitis s/p exp lap 5/31/23, Symptomatic Anemia s/p multiple Blood Transfusions, 1 unit transfused last week, Seizure disorder (2018-present), reports he has been experiencing multiple seizure episodes per month, each occurrence lasting a few seconds, described as unresponsive, staring, drooling. not responding to medication. Patient is now scheduled for Stage 1 Right Stereo EEG for Implantation of Electrodes with Dr. Rowe on 04/14/2025.    Problem 1: Cardiac Risk Stratification   - Pt with some fatigue on exertion  - No evidence of tachy-bailey arrythmia  - Reviewed TTE from 10/2024 with normal biventricular function, repeat ordered  - EKG 4/18 - NSR 71 bpm  - Mod risk for mod risk surgery, pending repeat TTE    Problem 2: CAD  - Hx CABG in 2023  - C/w ASA & Atorvastatin 20mg     Problem 3: Hx Aortic Dissection & Valve replacement  - TEVAR 3/3023  - TTE 10/2024 = LVEF 65%, mod concentric LVH, normal RV size and systolic function. severely dilated LA. Bioprosthetic valve in aortic position. no evidence of AR. Probable moderate MR. Moderate TR. pHTN, PASP 54  - Pending repeat TTE    Problem 4: HTN  - Well controlled  - C/w Amlodipine 10mg  - C/w HCTZ 12.5    Problem 5: Epilepsy  - Neurology and Neurosurgery following, appreciate recs  - s/p Insertion of Rt SEEG electrode lead 4/14, pending explant electrodes    Problem 6: DVT prophylaxis  - Lovenox            MARCUS Hare DO [facility]  Cardiovascular Medicine  [address], [address]  Available for call or text via [employer]  Office [phone]

## 2025-04-18 NOTE — CONSULT NOTE ADULT - PROBLEM SELECTOR RECOMMENDATION 9
Known seizure disorder on briviact and vimpat at home, presenting for recurrent seizures  -s/p stage 1 right stereo EEG for implantation of electrodes on 4/14 c/b seizures off of AEDs  -Neurology following, pt has had multiple seizures since being off of AEDs  -further AEDs per primary/neuro team--  -plan for mapping and explantation next week per primary Known seizure disorder on briviact and vimpat at home, presenting for recurrent seizures  -s/p stage 1 right stereo EEG for implantation of electrodes on 4/14 c/b seizures off of AEDs  -Neurology following, pt has had multiple seizures since being off of AEDs  -further AEDs per primary/neuro team  -plan for mapping and explantation next week per primary  -no medical contraindication to proceed to OR at this time, will defer cardiology clearance to Cards.

## 2025-04-18 NOTE — CONSULT NOTE ADULT - PROBLEM SELECTOR RECOMMENDATION 2
-Loud systolic murmur on exam, known SHAJI without LVOT obstruction  -significant Cardiac hx with CAD, CABG (2013), s/p aortic valve replacement s/p TEVAR, PE (s/p IVC filter/removal)  -rec Cardiology consult for clearance, likely will need TTE  -c/w home statin, ASA on hold per primary

## 2025-04-18 NOTE — CONSULT NOTE ADULT - SUBJECTIVE AND OBJECTIVE BOX
July 15, 2020       SELF  VIA       Patient: Sandra Leon   YOB: 2007   Date of Visit: 7/15/2020       Dear  Self:    Thank you for referring Sandra Leon to me for evaluation. Below are my notes for this visit with her.    If you have questions, please do not hesitate to call me. I look forward to following your patient along with you.      Sincerely,        Dmitriy Edmonds DO        CC: No Recipients  Rocky Govea DO  7/15/2020  2:57 PM  Sign when Signing Visit    Sports Medicine Follow-up Note    Referring Provider: Anthony Davis     Chief Complaint   Patient presents with   • Right Shoulder - Follow-up   • Left Wrist - Pain     FOOSH   • Follow-up     Over training       HPI: Sandra is a 13 year old female presenting today for a follow up with right shoulder pain and concern for over training.     Patient reports today that her right shoulder pain has improved significantly. She has been swimming for the past month and it has not caused her any discomfort. She has been swimming 5 days a week recently with dry land a few days a week. She has also been roller skating for cardio. During these activities she has not had any shoulder pain.     Patient also reports that her energy levels have improved. Since last visit she has maintained a daily caloric intake of at least [id number] calories. She has maintained a food journal and has had a diverse diet of protein, fruits and vegetables. She feels her energy has improved to baseline. She also reports her menstrual cycle has returned. She now has her period monthly, averaging 5-7 days with normal flow. Her LMP was 2 weeks ago.     Patient also reports left wrist pain after falling out onto an outstretched hand while roller skating 2 days ago. She had pain at the time of the injury but it has now improved. Today she reports mild pain on the posterior side of her hand next to her thumb. Also endorses mild swelling but has been able to use  her wrist without difficulty. Has not tried any medications or ice yet for her wrist.     Previous HPI:  3/12/2020  Sandra is a 12 year old female swimmer here for right shoulder pain and concern for overtraining.  Lucía was referred to our office by physical therapy who was initially treating her for back pain.  However she has developed some right shoulder pain in the next few weeks.  Her physical therapist is also noted that her form is deteriorating and she has not been following through with physical therapy treatment plans.    Lucía started swimming 3 years ago.  She is not involved in any other physical activities.  However her swimming practices and competitions schedule is very demanding.  She is currently practicing 6-7day/week: 10 practices per week for 1.5 hours which includes 8 swimming practices and 2 dryland practices.  She does have 2 a day practices twice a week.  Competitions are Friday Saturday Sunday.  Her predominant stroke is the back stroke, but she does participate in all styles.      Her intake consists of a lot of veggies, proteins and some grains.  She tried to \"avoid junk food\" but denies any dietary restrictions.  She drinks water only, not currently using any sports drinks.  She denies any supplements.   She is sleeping 5-7 hours/night as she returns home from practice around 7:30pm, has dinner around 9pm and has to complete homework.      She is in the eighth grade and is a good student and wants to become a physician.  She is very driven and is in advanced classes at a demanding [employer].  She will be planning on attending [employer] next year.    States her right shoulder pain was noted only a few weeks ago towards the end of practice it was located on the superior aspect of her shoulder.  Noted with both freestyle and backstroke.  Today she denies any pain.  She has not attempted any treatment for the shoulder pain.  She has not noted any redness warmth or  swelling.  She has full range of motion and full strength.  She denies any numbness or tingling in the upper extremity.  She denies any trauma to the area.    Review of Systems   Constitutional: Negative for chills and fever.   HENT: Negative for sneezing and sore throat.    Eyes: Negative for pain and itching.   Respiratory: Negative for cough and shortness of breath.    Cardiovascular: Negative for chest pain and palpitations.   Genitourinary: Negative for difficulty urinating and dysuria.   Musculoskeletal: Positive for arthralgias.   Skin: Negative for color change and wound.   Neurological: Negative for dizziness and numbness.       History reviewed. No pertinent past medical history.    Past Surgical History:   Procedure Laterality Date   • No past surgeries         Family History   Problem Relation Age of Onset   • Asthma Brother      No immediate family members with RA, SLE, gout    ALLERGIES:   Allergen Reactions   • Mold   (Environmental) Other (See Comments)       Current Outpatient Medications   Medication Sig Dispense Refill   • budesonide/formoterol (SYMBICORT) 80-4.5 MCG/ACT inhaler Inhale 2 puffs into the lungs 2 times daily.       No current facility-administered medications for this visit.        Social History     Socioeconomic History   • Marital status: Single     Spouse name: Not on file   • Number of children: Not on file   • Years of education: Not on file   • Highest education level: Not on file   Occupational History   • Not on file   Social Needs   • Financial resource strain: Not on file   • Food insecurity:     Worry: Not on file     Inability: Not on file   • Transportation needs:     Medical: Not on file     Non-medical: Not on file   Tobacco Use   • Smoking status: Never Smoker   • Smokeless tobacco: Never Used   Substance and Sexual Activity   • Alcohol use: Not on file   • Drug use: Not on file   • Sexual activity: Not on file   Lifestyle   • Physical activity:     Days per week:  Not on file     Minutes per session: Not on file   • Stress: Not on file   Relationships   • Social connections:     Talks on phone: Not on file     Gets together: Not on file     Attends Jainism service: Not on file     Active member of club or organization: Not on file     Attends meetings of clubs or organizations: Not on file     Relationship status: Not on file   • Intimate partner violence:     Fear of current or ex partner: Not on file     Emotionally abused: Not on file     Physically abused: Not on file     Forced sexual activity: Not on file   Other Topics Concern   • Not on file   Social History Narrative    Lives with parents, brother, sister.    No pets    No second hand smoke exposure.  No vaping.    Attends school       Physical Exam  Vitals signs and nursing note reviewed.   Constitutional:       Appearance: Normal appearance.   HENT:      Head: Normocephalic and atraumatic.      Right Ear: External ear normal.      Left Ear: External ear normal.      Nose: Nose normal.   Eyes:      Extraocular Movements: Extraocular movements intact.      Conjunctiva/sclera: Conjunctivae normal.      Pupils: Pupils are equal, round, and reactive to light.   Pulmonary:      Effort: Pulmonary effort is normal. No respiratory distress.   Skin:     General: Skin is warm and dry.   Neurological:      General: No focal deficit present.      Mental Status: She is alert and oriented to person, place, and time.   Psychiatric:         Mood and Affect: Mood normal.         Behavior: Behavior normal.         Thought Content: Thought content normal.         Judgment: Judgment normal.       Right Hand Exam   Right hand exam is normal.      Left Hand Exam     Tenderness   The patient is experiencing tenderness in the snuff box, dorsal area and radial area.     Range of Motion   Wrist   Extension: normal   Flexion: normal   Pronation: normal   Supination: normal     Muscle Strength   The patient has normal left wrist  strength.  Wrist extension: 5/5   Wrist flexion: 5/5   :  5/5     Other   Erythema: absent  Scars: absent  Sensation: normal  Pulse: present    Comments:  Mild swelling without discoloration over dorsal side of proximal wrist.         assessment & Plan:   Patient is a 13-year-old female right-hand-dominant that is following up for shoulder pain, overexertion, and has left wrist pain.     1. Left wrist pain    2. Fatigue due to excessive exertion, subsequent encounter    3. Scapular dyskinesis      Left wrist pain  -pt has pain after falling on an outstretched hand while roller skating, pt has pain around the anatomical snuffbox on exam, full strength and ROM. After evaluation patient had left wrist XR done which was evaluated in office and is concerning for possible scaphoid fracture. Patient is to wear wrist splint and follow up in 2 weeks for re-evaluation.    Fatigue due to excessive exertion  -patient has hx of over exertion that at last visit she had fatigue. Since last visit patient has improved her caloric intake, maintained a food journal, and modified her activity. Today reports her energy is at baseline again. Today she is 112 lbs, at last visit in March she weighed 100lbs. BMI of 20.4 today.     At this time continue current plan with maintaining diet journal, maintaining caloric intake. Continue to monitor athletic activity weekly. Patient instructed not to swim at this time due to having splint on for wrist pain. Follow up as needed.     Scapular dyskinesis  -Patient has a history of right shoulder pain that last visit was diagnosed as scapular dyskinesis, since then the patient reports that her pain is improved and she no longer has any limits of range of motion or strength.  She has previously completed physical therapy prior to last visit.  On exam today she has no limitations of range of motion or strength.    At this time she may continue with her normal activity, continue with home exercises.   Follow-up as needed      Valeria Davidson, ATC    This note was created using the Dragon voice recognition system. Errors in content may be related to improper recognition of the system. Effort to review and correct the note has been made but irregularities may still be present.    Rocky Govea, DO   Sports Medicine Fellow PGY-4  [facility]       CC Referring Provider: Anthony Davis             Yamileth Rowe MD  Division of Hospital Medicine  [facility]     HPI:  56 y/o M with PMHx significant for Type A aortic dissection s/p AV resuspension, ascending aorta and hemiarch replacement, CAD, CABG x 1 (5/2013), s/p Aortic Valve Replacement, s/p TEVAR (05/2023), PE (s/p IVC filter, subsequent removal by Dr. Ellison 9/13/23, not on AC), necrotizing pancreatitis s/p exp lap 5/31/23, Symptomatic Anemia s/p multiple Blood Transfusions, 1 unit transfused last week, Seizure disorder (2018-present), reports he has been experiencing multiple seizure episodes per month, each occurrence lasting a few seconds, described as unresponsive, staring, drooling. not responding to medication. Patient is now scheduled for Stage 1 Right Stereo EEG for Implantation of Electrodes with Dr. Rowe on 04/14/2025. (11 Apr 2025 10:17)    Course has subsequently complicated by persistent seizure activity when standing medications were held. Seen this PM after an episode of "feeling off" and patient staring off, however pt with no recollection of incident. Otherwise feels well, no complaints, medications reviewed with pts, accurate in chart. Plan for mapping on Monday followed by OR for explant.       PAST MEDICAL & SURGICAL HISTORY:  HTN (hypertension)      Aortic dissection      CAD (coronary artery disease)      Seizure disorder      Hypertension      Status post endovascular aneurysm repair (EVAR)      Pancreatitis  hospitalized for 5 months per spouse      DVT, lower extremity      Pulmonary embolism      Anemia      Marijuana use      S/P aortic bifurcation bypass graft      S/P CABG x 1      Elective surgery  IVC filter      H/O exploratory laparotomy      H/O aortic valve repair      H/O aortic aneurysm repair          Review of Systems:   CONSTITUTIONAL: No fever, chills  HEENT: No sore throat, vision changes  RESPIRATORY: No cough, wheezing, shortness of breath  CARDIOVASCULAR: No chest pain, palpitations, leg edema  GASTROINTESTINAL: No abdominal pain, nausea, vomiting, diarrhea or constipation. No melena or hematochezia.  GENITOURINARY: No dysuria, frequency, hematuria  NEUROLOGICAL: No headaches, memory loss, loss of strength, numbness, or tremors  SKIN: No itching, burning, rashes, or lesions   MUSCULOSKELETAL: No joint pain or swelling; No muscle, back, or extremity pain  PSYCHIATRIC: No depression, anxiety  HEME/LYMPH: No easy bruising, or bleeding gums      Allergies    No Known Allergies    Intolerances        Social History: no toxic habits    FAMILY HISTORY: father-CVA       MEDICATIONS  (STANDING):  acetaminophen   IVPB .. 1000 milliGRAM(s) IV Intermittent every 6 hours  amLODIPine   Tablet 10 milliGRAM(s) Oral daily  atorvastatin 20 milliGRAM(s) Oral at bedtime  brivaracetam  IVPB 100 milliGRAM(s) IV Intermittent every 12 hours  enoxaparin Injectable 40 milliGRAM(s) SubCutaneous <User Schedule>  hydrochlorothiazide 12.5 milliGRAM(s) Oral daily  lacosamide IVPB 200 milliGRAM(s) IV Intermittent every 12 hours  methocarbamol 750 milliGRAM(s) Oral every 8 hours  polyethylene glycol 3350 17 Gram(s) Oral two times a day  senna 2 Tablet(s) Oral at bedtime    MEDICATIONS  (PRN):  acetaminophen     Tablet .. 650 milliGRAM(s) Oral every 6 hours PRN Mild Pain (1 - 3)  bisacodyl 5 milliGRAM(s) Oral daily PRN Constipation  HYDROmorphone   Tablet 2 milliGRAM(s) Oral every 4 hours PRN Moderate Pain (4 - 6)  HYDROmorphone   Tablet 4 milliGRAM(s) Oral every 4 hours PRN Severe Pain (7 - 10)  HYDROmorphone  Injectable 0.5 milliGRAM(s) IV Push every 6 hours PRN breakthrough pain  lacosamide Injectable 100 milliGRAM(s) IV Push once PRN 2nd line therapy, GTC lasting >5 mins refractory to ativan  LORazepam   Injectable 1 milliGRAM(s) IV Push once PRN GTC lasting >5 mins with vital sign changes  melatonin 3 milliGRAM(s) Oral at bedtime PRN Insomnia  ondansetron Injectable 4 milliGRAM(s) IV Push every 6 hours PRN Nausea and/or Vomiting        CAPILLARY BLOOD GLUCOSE        I&O's Summary    17 Apr 2025 07:01  -  18 Apr 2025 07:00  --------------------------------------------------------  IN: 290 mL / OUT: 1195 mL / NET: -905 mL    18 Apr 2025 07:01  -  18 Apr 2025 18:48  --------------------------------------------------------  IN: 639 mL / OUT: 625 mL / NET: 14 mL        Physical Exam:  Vital Signs Last 24 Hrs  T(C): 37.2 (18 Apr 2025 17:06), Max: 37.2 (18 Apr 2025 17:06)  T(F): 99 (18 Apr 2025 17:06), Max: 99 (18 Apr 2025 17:06)  HR: 111 (18 Apr 2025 17:20) (69 - 111)  BP: 163/93 (18 Apr 2025 17:20) (121/70 - 164/91)  BP(mean): --  RR: 18 (18 Apr 2025 17:20) (18 - 19)  SpO2: 96% (18 Apr 2025 17:20) (95% - 100%)    Parameters below as of 18 Apr 2025 17:20  Patient On (Oxygen Delivery Method): room air        CONSTITUTIONAL: NAD, well-developed, well-groomed, head wrapped  EYES: PERRLA; conjunctiva and sclera clear  ENMT: Moist oral mucosa, no pharyngeal injection or exudates; normal dentition  NECK: Supple, no palpable masses  RESPIRATORY: Normal respiratory effort; lungs are clear to auscultation bilaterally  CARDIOVASCULAR: Regular rate and rhythm, loud systolic murmur; No lower extremity edema  ABDOMEN: Soft, Nondistended, Nontender to palpation, normoactive bowel sounds  MUSCULOSKELETAL:  No clubbing or cyanosis of digits; no joint swelling or tenderness to palpation  PSYCH: A+O to person, place, and time; affect appropriate  NEUROLOGY: CN 2-12 are intact and symmetric; no gross sensory deficits   SKIN: No rashes; no palpable lesions    LABS:                        9.6    9.51  )-----------( 235      ( 18 Apr 2025 07:10 )             30.4     04-18    137  |  100  |  16  ----------------------------<  81  3.6   |  23  |  0.96    Ca    9.4      18 Apr 2025 07:10  Phos  4.0     04-18  Mg     2.0     04-18            Urinalysis Basic - ( 18 Apr 2025 07:10 )    Color: x / Appearance: x / SG: x / pH: x  Gluc: 81 mg/dL / Ketone: x  / Bili: x / Urobili: x   Blood: x / Protein: x / Nitrite: x   Leuk Esterase: x / RBC: x / WBC x   Sq Epi: x / Non Sq Epi: x / Bacteria: x          RADIOLOGY & ADDITIONAL TESTS:  Results Reviewed:   Imaging Personally Reviewed:  Electrocardiogram Personally Reviewed:    COORDINATION OF CARE:  Care Discussed with Consultants/Other Providers [Y/N]: GEO  Prior or Outpatient Records Reviewed [Y/N]:

## 2025-04-18 NOTE — PROGRESS NOTE ADULT - ASSESSMENT
55M, PMH for Type A aortic dissection s/p AV resuspension, ascending aorta and hemiarch replacement, CAD, CABG x 1 (5/2013), s/p Aortic Valve Replacement, s/p TEVAR (05/2023), PE (s/p IVC filter, subsequent removal by Dr. Ellison 9/13/23, not on AC), necrotizing pancreatitis s/p exp lap 5/31/23, Symptomatic Anemia, seizure disorder (2018-present), reports he has been experiencing multiple seizure episodes per month, each occurrence lasting a few seconds, described as unresponsive, staring, drooling. not responding to medication. Admitted for scheduled Stage 1 Right Stereo EEG for Implantation of Electrodes with Dr. Rowe on 04/14/2025.    Impression:     PLAN:    Patient administered Vimpat 50x2, had recurrent seizures after Vimpat administered Briviact 100mg x1  Continue monitoring off standing meds  Continue PRN Dilaudid for the headache  Follow up with neuro surgery for further recs  Continue Norvasc 10mg daily, HCTZ 12.5 daily and Lipitor  Rescues: first line ativan 2mg IV for GTC lasting >5min, second line vimpat 200mg IV    CORE MEASURES:        AED levels [] Sent [] Pending [] Resulted     LFTs [] normal [] elevated      Plan and education provided to [] patient []family at bedside [] awaiting for family     Seizure Semiology  [] Tonic clonic  [] Clonic  [] Tonic  [] Unresponsive  [] Focal with impared awareness  [] Focal without impaed awareness    Obtain screening lower extremity venous ultrasound in patients who meet 1 or more of the following criteria as patient is high risk for DVT/PE on admission:   [] History of DVT/PE  []Hypercoagulable states (Factor V Leiden, Cancer, OCP, etc. )  []Prolonged immobility (hemiplegia/hemiparesis/post operative or any other extended immobilization)  [] Transferred from outside facility (Rehab or Long term care) 55M, PMH for Type A aortic dissection s/p AV resuspension, ascending aorta and hemiarch replacement, CAD, CABG x 1 (5/2013), s/p Aortic Valve Replacement, s/p TEVAR (05/2023), PE (s/p IVC filter, subsequent removal by Dr. Ellison 9/13/23, not on AC), necrotizing pancreatitis s/p exp lap 5/31/23, Symptomatic Anemia, seizure disorder (2018-present), reports he has been experiencing multiple seizure episodes per month, each occurrence lasting a few seconds, described as unresponsive, staring, drooling. not responding to medication. Admitted for scheduled Stage 1 Right Stereo EEG for Implantation of Electrodes with Dr. Rowe on 04/14/2025.    Impression:     PLAN:    Continue PRN Dilaudid for the headache  Follow up with neuro surgery for further recs  Continue Norvasc 10mg daily, HCTZ 12.5 daily and Lipitor  Rescues: first line ativan 2mg IV for GTC lasting >5min, second line vimpat 200mg IV    CORE MEASURES:        AED levels [] Sent [] Pending [] Resulted     LFTs [] normal [] elevated      Plan and education provided to [] patient []family at bedside [] awaiting for family     Seizure Semiology  [] Tonic clonic  [] Clonic  [] Tonic  [] Unresponsive  [] Focal with impared awareness  [] Focal without impaed awareness    Obtain screening lower extremity venous ultrasound in patients who meet 1 or more of the following criteria as patient is high risk for DVT/PE on admission:   [] History of DVT/PE  []Hypercoagulable states (Factor V Leiden, Cancer, OCP, etc. )  []Prolonged immobility (hemiplegia/hemiparesis/post operative or any other extended immobilization)  [] Transferred from outside facility (Rehab or Long term care) 55M, PMH for Type A aortic dissection s/p AV resuspension, ascending aorta and hemiarch replacement, CAD, CABG x 1 (5/2013), s/p Aortic Valve Replacement, s/p TEVAR (05/2023), PE (s/p IVC filter, subsequent removal by Dr. Ellison 9/13/23, not on AC), necrotizing pancreatitis s/p exp lap 5/31/23, Symptomatic Anemia, seizure disorder (2018-present), reports he has been experiencing multiple seizure episodes per month, each occurrence lasting a few seconds, described as unresponsive, staring, drooling. not responding to medication. Admitted for scheduled Stage 1 Right Stereo EEG for Implantation of Electrodes with Dr. Rowe on 04/14/2025.    PLAN:    - Continue to monitor off seizure medications  - Follow up with neuro surgery for further recs  - Continue Norvasc 10mg daily, HCTZ 12.5 daily and Lipitor  - Rescues: first line ativan 2mg IV for GTC lasting >5min, second line vimpat 200mg IV    CORE MEASURES:        AED levels [] Sent [] Pending [] Resulted     LFTs [x] normal [] elevated      Plan and education provided to [x] patient []family at bedside [] awaiting for family     Seizure Semiology  [] Tonic clonic  [] Clonic  [] Tonic  [] Unresponsive  [] Focal with impaired awareness  [] Focal without impaired awareness

## 2025-04-18 NOTE — PROVIDER CONTACT NOTE (OTHER) - NAME OF MD/NP/PA/DO NOTIFIED:
MD Tamiko Hurley
Neurosx Dr. Poncho Chen & EMU neuro team
LILIA Scanlon, [facility] Neuro team
DO Fatuma Leroy
LILIA Esteban & neurosx LILIA Figueroa
Neurosx LILIA Chen

## 2025-04-18 NOTE — PROVIDER CONTACT NOTE (OTHER) - DATE AND TIME:
18-Apr-2025 17:31
18-Apr-2025 19:25
17-Apr-2025 08:30
17-Apr-2025 11:20
17-Apr-2025 12:34
17-Apr-2025 09:30

## 2025-04-19 PROCEDURE — 99233 SBSQ HOSP IP/OBS HIGH 50: CPT

## 2025-04-19 PROCEDURE — 95720 EEG PHY/QHP EA INCR W/VEEG: CPT

## 2025-04-19 PROCEDURE — 99232 SBSQ HOSP IP/OBS MODERATE 35: CPT

## 2025-04-19 RX ADMIN — METHOCARBAMOL 750 MILLIGRAM(S): 500 TABLET, FILM COATED ORAL at 05:40

## 2025-04-19 RX ADMIN — METHOCARBAMOL 750 MILLIGRAM(S): 500 TABLET, FILM COATED ORAL at 15:33

## 2025-04-19 RX ADMIN — ATORVASTATIN CALCIUM 20 MILLIGRAM(S): 80 TABLET, FILM COATED ORAL at 21:27

## 2025-04-19 RX ADMIN — BRIVARACETAM 240 MILLIGRAM(S): 75 TABLET, FILM COATED ORAL at 17:18

## 2025-04-19 RX ADMIN — LACOSAMIDE 140 MILLIGRAM(S): 150 TABLET, FILM COATED ORAL at 04:46

## 2025-04-19 RX ADMIN — Medication 650 MILLIGRAM(S): at 05:45

## 2025-04-19 RX ADMIN — LACOSAMIDE 140 MILLIGRAM(S): 150 TABLET, FILM COATED ORAL at 12:20

## 2025-04-19 RX ADMIN — Medication 2 MILLIGRAM(S): at 09:07

## 2025-04-19 RX ADMIN — BRIVARACETAM 240 MILLIGRAM(S): 75 TABLET, FILM COATED ORAL at 05:38

## 2025-04-19 RX ADMIN — LACOSAMIDE 140 MILLIGRAM(S): 150 TABLET, FILM COATED ORAL at 19:44

## 2025-04-19 RX ADMIN — AMLODIPINE BESYLATE 10 MILLIGRAM(S): 10 TABLET ORAL at 05:40

## 2025-04-19 RX ADMIN — ENOXAPARIN SODIUM 40 MILLIGRAM(S): 100 INJECTION SUBCUTANEOUS at 21:27

## 2025-04-19 RX ADMIN — Medication 650 MILLIGRAM(S): at 23:36

## 2025-04-19 RX ADMIN — METHOCARBAMOL 750 MILLIGRAM(S): 500 TABLET, FILM COATED ORAL at 21:27

## 2025-04-19 RX ADMIN — Medication 650 MILLIGRAM(S): at 06:15

## 2025-04-19 NOTE — PROGRESS NOTE ADULT - ASSESSMENT
HPI:  56 y/o M with PMHx significant for Type A aortic dissection s/p AV resuspension, ascending aorta and hemiarch replacement, CAD, CABG x 1 (5/2013), s/p Aortic Valve Replacement, s/p TEVAR (05/2023), PE (s/p IVC filter, subsequent removal by Dr. Ellison 9/13/23, not on AC), necrotizing pancreatitis s/p exp lap 5/31/23, Symptomatic Anemia s/p multiple Blood Transfusions, 1 unit transfused last week, Seizure disorder (2018-present), reports he has been experiencing multiple seizure episodes per month, each occurrence lasting a few seconds, described as unresponsive, staring, drooling. not responding to medication. Patient is now scheduled for Stage 1 Right Stereo EEG for Implantation of Electrodes with Dr. Rowe on 04/14/2025. (11 Apr 2025 10:17)    Procedure: s/p Insertion of Rt SEEG electrode lead 4/14      NEURO:  - POD4 sEEG with insertion of 11 depth electrodes on Right side.  - vEEG 4/15 right sided spikes  - Postop CTH w/o significant heme.   - Postop MRI stable  -Pain control: Tylenol, Oxy changed to dilaudid oral 4/16 with minor improvement  -Patient is back on briviact 100 bid and vimpat 200 TID   - plan is to remove electrode on 4/21   -Continue on robaxin     PULM:  SpO2 goal >94%.   - Incentive spirometry.     CV:  Hemodynamically stable   - Continue home meds amlodipine 10 daily, Lipitor 20 daily, HCTZ 12.5 daily  - echo :p   -appreciate cardiology follow up     GI:  Diet: Regular   GI prophylaxis [X] not indicated [] PPI [] other:  Bowel regimen [] colace [X] senna [x] other: Miralax, bm on 4/19     RENAL:  Fluids: IVL  voiding  replete electrolytes as needed    ENDO:   Goal euglycemia (-180)    HEME/ONC:  VTE prophylaxis: SCDs, SQL  BLE Dopp no DVTs.    ID:  Postop Ancef completed.  - Monitor for fevers.    appreciate neurology follow up   Will discuss with Dr Rowe  -35 minutes spent for patient care and answered all questions.    -will discuss with Dr. Rowe   -[employer] ([employer]) [id number]

## 2025-04-19 NOTE — PROGRESS NOTE ADULT - SUBJECTIVE AND OBJECTIVE BOX
Pediatric Well Child Exam: 4 Years of age    Chief Complaint   Patient presents with   • Well Child     4 years   • Well Child       SUBJECTIVE:  Curt Koch is a 4 year old female who presents for a well child exam.  Patient presents  with Mother.    Preferred method of results communication:     Cell Phone:   Telephone Information:   Mobile [phone]     Okay to leave a message containing results? Yes    CONCERNS RAISED TODAY: none    Continues to have speech at school  Others with difficulty understanding    Previously had neuropsych eval 2021    SLEEP PATTERN:  Hours / Night: 9-10.    NAPS: Hours / Day: 0.    NUTRITION/GI:  Appetite: Good.  Needs to work on water  Milk: 1 Percent 0-2 CUPS/DAY.  Food:   · Eats fruits/vegetables: [x]  YES     []  NO   · Eats meat: [x]  YES     []  NO   Water Supply at Home: private well and bottled.  Stools: 1 / day.    /HOMECARE:   :   []  YES      :  []  YES     [x]  NO   none      FAMILY/HOME ENVIRONMENT:  Changes in home/work environment: No  Parents working outside the home: father  Toxic Exposure:  Tobacco Use: Not Asked         VISION SCREENING:       Vision Screening    Right eye Left eye Both eyes   Without correction 20/20 20/20    With correction          DEVELOPMENT:  [x] YES [] NO [] UNKNOWN  Walks up/down stairs well?  [x] YES  [] NO [] UNKNOWN  Skips, walks on tip toes, stand on 1 foot?   [x] YES [] NO [] UNKNOWN  Copies triangles?  [x] YES [] NO [] UNKNOWN  Can you toilet on own?  [x] YES [] NO [] UNKNOWN  Uses toilet on own?  [x] YES [] NO [] UNKNOWN  Cuts with blunt scissors?  [x] YES [] NO [] UNKNOWN  Writes first name?  [x] YES [] NO [] UNKNOWN  Able to tie knot?  [x] YES [] NO [] UNKNOWN  Dresses self except shoes?  [x] YES [] NO [] UNKNOWN  Counts objects up to 10?  [] YES [x] NO [] UNKNOWN  Knows address/phone number?  [x] YES [] NO [] UNKNOWN  Draws person (6+ body parts)?  [x] YES [] NO [] UNKNOWN  Beginning to read?  Starting to  [x] YES [] NO [] UNKNOWN  Responds to \"why\" questions?  [x] YES [] NO [] UNKNOWN  Retells stories/clear beginning, middle, end?  [x] YES [] NO [] UNKNOWN  Has friends?  [x] YES [] NO [] UNKNOWN  Learning appropriate manners?  [x] YES [] NO [] UNKNOWN  Helps with chores?  [x] YES [] NO [] UNKNOWN  Knows gender?    OBJECTIVE:  PAST HISTORIES:  Allergies, Medications, Medical history, Surgical history, Family history reviewed and updated.  IMMUNIZATION STATUS: Up to date per review of the electronic health records.    IMMUNIZATION REACTIONS: None  VARICELLA STATUS: confirmed by vaccine administration    RECENT HEALTH EVENTS:  Illnesses: 3/2023 - fever, abd pain  3/24/23 - cough/bronchospasm/  Hospitalizations: None.  Injuries or Accidents: None.    REVIEW OF SYSTEMS:    All systems reviewed and negative except as documented in \"Concerns raised\".    PHYSICAL EXAM:      VITAL SIGNS:   Visit Vitals  BP 90/56   Pulse 92   Ht 3' 8\" (1.118 m)   Wt 19.6 kg (43 lb 1.6 oz)   BMI 15.65 kg/m²    62 %ile (Z= 0.31) based on CDC (Girls, 2-20 Years) weight-for-age data using vitals from 8/14/2023.  GENERAL:  Well appearing female child.  Alert and active.  SKIN:  Warm, normal turgor.  No cyanosis.  No rash.  HEAD:  Normocephalic, atraumatic.    EYES:  Conjunctivae appear normal, noninjected, nonicteric, positive red reflex.  NOSE:  Appears normal without drainage.  EARS:  Normal external auditory canals. Tympanic membranes are transparent with normal landmarks.  THROAT:  Moist mucous membranes without lesions.  NECK:  Supple, no lymphadenopathy or masses.  HEART:  Regular rate and rythm.  Normal S1, S2.  No murmurs, rubs, gallops.   LUNGS:  Clear to auscultation.  No wheezes, rales, rhonchi.  Normal work effort with breathing.  ABDOMEN:  Bowel sounds present. Soft, nontender.  No hepatomegaly.  No splenomegaly.  No masses.  GENITOURINARY: Roque stage 1. Normal female genitalia.  Rectum/anus patent.  EXTREMITIES: Normal  bilateral range of motion of upper and lower extremities. Peripheral pulses 2/4. Equal femoral pulses.  BACK: Spine straight.   NEUROLOGIC:  Normal muscle tone and symmetrical strength.  Normal deep tendon reflexes.  Symmetrical facial motor function.       Assessment:  4 year old female well child.  Good growth, normal BMI  Speech through school, consider speech during summer breaks    Plan:  1. All parental concerns and questions discussed.  2. Anticipatory guidance provided, handout/s given   Safety: Car, bicycle, fire, sharp objects, falls, water  Development  Diet  Discipline  Television  Analgesics/antipyretics  Sun exposure  Tobacco-free home  Dental care  Lead exposure risk: None.  3. Immunizations per orders.  Risks, benefits, and side effects discussed. VIS for Immunizations given.  4. Orders for immunizations given today per the recommended schedule.    Follow up: Return in about 1 year (around 8/14/2024) for Well Child Visit.     Body mass index is 15.65 kg/m².    BMI ASSESSMENT/PLAN:  Patient BMI is within normal range.    Encounter for routine child health examination without abnormal findings    BMI (body mass index), pediatric, 5% to less than 85% for age    Speech disturbance, unspecified type                 SUBJECTIVE:   Patient was seen and evaluated at bedside. Patient is resting in bed and is in no new acute distress.   OVERNIGHT EVENTS:   none   Vital Signs Last 24 Hrs  T(C): 37.1 (19 Apr 2025 08:30), Max: 37.2 (18 Apr 2025 17:06)  T(F): 98.8 (19 Apr 2025 08:30), Max: 99 (18 Apr 2025 17:06)  HR: 64 (19 Apr 2025 08:30) (64 - 131)  BP: 134/79 (19 Apr 2025 08:30) (121/70 - 198/96)  BP(mean): --  RR: 18 (19 Apr 2025 08:30) (18 - 18)  SpO2: 100% (19 Apr 2025 08:30) (89% - 100%)    Parameters below as of 19 Apr 2025 08:30  Patient On (Oxygen Delivery Method): room air        PHYSICAL EXAM:    General: No Acute Distress     Neurological: Awake, alert oriented to person, place and time, Following Commands, PERRL, EOMI, Face Symmetrical, Speech Fluent, Moving all extremities, Muscle Strength normal in all four extremities, No Drift, Sensation to Light Touch Intact    Pulmonary: Clear to Auscultation, No Rales, No Rhonchi, No Wheezes     Cardiovascular: S1, S2, Regular Rate and Rhythm     Gastrointestinal: Soft, Nontender, Nondistended     Incision: clean and dry     LABS:                        9.6    9.51  )-----------( 235      ( 18 Apr 2025 07:10 )             30.4    04-18    137  |  100  |  16  ----------------------------<  81  3.6   |  23  |  0.96    Ca    9.4      18 Apr 2025 07:10  Phos  4.0     04-18  Mg     2.0     04-18 04-18 @ 07:01  -  04-19 @ 07:00  --------------------------------------------------------  IN: 639 mL / OUT: 855 mL / NET: -216 mL      DRAINS:     MEDICATIONS:  Antibiotics:    Neuro:  acetaminophen     Tablet .. 650 milliGRAM(s) Oral every 6 hours PRN Mild Pain (1 - 3)  brivaracetam  IVPB 100 milliGRAM(s) IV Intermittent every 12 hours  HYDROmorphone   Tablet 2 milliGRAM(s) Oral every 4 hours PRN Moderate Pain (4 - 6)  HYDROmorphone   Tablet 4 milliGRAM(s) Oral every 4 hours PRN Severe Pain (7 - 10)  HYDROmorphone  Injectable 0.5 milliGRAM(s) IV Push every 6 hours PRN breakthrough pain  lacosamide Injectable 100 milliGRAM(s) IV Push once PRN 2nd line therapy, GTC lasting >5 mins refractory to ativan  lacosamide IVPB 200 milliGRAM(s) IV Intermittent <User Schedule>  LORazepam   Injectable 1 milliGRAM(s) IV Push once PRN GTC lasting >5 mins with vital sign changes  melatonin 3 milliGRAM(s) Oral at bedtime PRN Insomnia  methocarbamol 750 milliGRAM(s) Oral every 8 hours  ondansetron Injectable 4 milliGRAM(s) IV Push every 6 hours PRN Nausea and/or Vomiting    Cardiac:  amLODIPine   Tablet 10 milliGRAM(s) Oral daily  hydrochlorothiazide 12.5 milliGRAM(s) Oral daily    Pulm:    GI/:  bisacodyl 5 milliGRAM(s) Oral daily PRN Constipation  polyethylene glycol 3350 17 Gram(s) Oral two times a day  senna 2 Tablet(s) Oral at bedtime    Other:   atorvastatin 20 milliGRAM(s) Oral at bedtime  enoxaparin Injectable 40 milliGRAM(s) SubCutaneous <User Schedule>    DIET: [] Regular [] CCD [] Renal [] Puree [] Dysphagia [] Tube Feeds:   regular   IMAGING:   ACC: [id number] EXAM:  MR BRAIN   ORDERED BY: KALLIE GROVE     PROCEDURE DATE:  04/15/2025          INTERPRETATION:  Clinical indication: Post deep EEG lead placement      Magnetic resonance imaging of the brain was carried out with transaxial,   coronal and sagittal 3-D MP Rage and axial T2-weighted series on a 1.5   Priti magnet.    Comparison is made with the prior CT of 4/14/2025.    There are multiple right-sided frontal and temporal dino hole's through   which deep brain EEG leads are inserted in the right frontal and temporal   region. No hemorrhage or edema is identified. There is no midline shift.   The ventricles are normal in size and position.    IMPRESSION: Right-sided frontal and temporal deep EEG leads. No   hemorrhage or evidence of edema.    --- End of Report ---            AMEYA GILES MD; Attending Radiologist  This document has been electronically signed. Apr 15 2025 12:46PM

## 2025-04-19 NOTE — PROGRESS NOTE ADULT - ASSESSMENT
54 y/o M with PMHx significant for Type A aortic dissection s/p AV resuspension, ascending aorta and hemiarch replacement, CAD, CABG x 1 (5/2013), s/p Aortic Valve Replacement, s/p TEVAR (05/2023), PE (s/p IVC filter, subsequent removal by Dr. Ellison 9/13/23, not on AC), necrotizing pancreatitis s/p exp lap 5/31/23, Symptomatic Anemia s/p multiple Blood Transfusions, Seizure disorder (2018-present), presenting for recurring seizures s/p Stage 1 Right Stereo EEG for Implantation of Electrodes with Dr. Rowe on 04/14/2025. Course has subsequently complicated by persistent seizure activity

## 2025-04-19 NOTE — PROGRESS NOTE ADULT - PROBLEM SELECTOR PLAN 4
Diet: Regular w/ ensure  DVT: lovenox  Dispo: pending OR.    Wife present on the phone. Diet: Regular w/ ensure  DVT: lovenox  Dispo: pending OR.    BM 4/18  Wife present on the phone.

## 2025-04-19 NOTE — PROGRESS NOTE ADULT - SUBJECTIVE AND OBJECTIVE BOX
[facility] Division of Hospital Medicine  Susankavitha Cates DO   Available via [employer] Teams      Patient is a 55y old  Male who presents with a chief complaint of Patient was admitted for SEEG placement for seizure ficus mapping. (19 Apr 2025 10:03)      SUBJECTIVE / OVERNIGHT EVENTS:  Had c/f seizure episode ovn, started on AEDs  Feels better after AEDs started. No fever, chills, CP, SOB, abd pain, N/V    MEDICATIONS  (STANDING):  amLODIPine   Tablet 10 milliGRAM(s) Oral daily  atorvastatin 20 milliGRAM(s) Oral at bedtime  brivaracetam  IVPB 100 milliGRAM(s) IV Intermittent every 12 hours  enoxaparin Injectable 40 milliGRAM(s) SubCutaneous <User Schedule>  hydrochlorothiazide 12.5 milliGRAM(s) Oral daily  lacosamide IVPB 200 milliGRAM(s) IV Intermittent <User Schedule>  methocarbamol 750 milliGRAM(s) Oral every 8 hours  senna 2 Tablet(s) Oral at bedtime    MEDICATIONS  (PRN):  acetaminophen     Tablet .. 650 milliGRAM(s) Oral every 6 hours PRN Mild Pain (1 - 3)  bisacodyl 5 milliGRAM(s) Oral daily PRN Constipation  HYDROmorphone   Tablet 2 milliGRAM(s) Oral every 4 hours PRN Moderate Pain (4 - 6)  HYDROmorphone   Tablet 4 milliGRAM(s) Oral every 4 hours PRN Severe Pain (7 - 10)  HYDROmorphone  Injectable 0.5 milliGRAM(s) IV Push every 6 hours PRN breakthrough pain  lacosamide Injectable 100 milliGRAM(s) IV Push once PRN 2nd line therapy, GTC lasting >5 mins refractory to ativan  LORazepam   Injectable 1 milliGRAM(s) IV Push once PRN GTC lasting >5 mins with vital sign changes  melatonin 3 milliGRAM(s) Oral at bedtime PRN Insomnia  ondansetron Injectable 4 milliGRAM(s) IV Push every 6 hours PRN Nausea and/or Vomiting      CAPILLARY BLOOD GLUCOSE        I&O's Summary    18 Apr 2025 07:01  -  19 Apr 2025 07:00  --------------------------------------------------------  IN: 639 mL / OUT: 855 mL / NET: -216 mL    19 Apr 2025 07:01  -  19 Apr 2025 14:23  --------------------------------------------------------  IN: 240 mL / OUT: 250 mL / NET: -10 mL        PHYSICAL EXAM:  Vital Signs Last 24 Hrs  T(C): 37.2 (19 Apr 2025 12:01), Max: 37.2 (18 Apr 2025 17:06)  T(F): 99 (19 Apr 2025 12:01), Max: 99 (18 Apr 2025 17:06)  HR: 69 (19 Apr 2025 12:01) (64 - 131)  BP: 115/66 (19 Apr 2025 12:01) (115/66 - 198/96)  BP(mean): --  RR: 18 (19 Apr 2025 12:01) (18 - 18)  SpO2: 99% (19 Apr 2025 12:01) (89% - 100%)    Parameters below as of 19 Apr 2025 12:01  Patient On (Oxygen Delivery Method): room air      CONSTITUTIONAL: NAD, well-developed, well-groomed, on EEG  EYES: conjunctiva and sclera clear  ENMT: Moist oral mucosa  RESPIRATORY: Normal respiratory effort; lungs are clear to auscultation bilaterally  CARDIOVASCULAR: Regular rate and rhythm, loud systolic murmur; No lower extremity edema  ABDOMEN: Soft, Nondistended, Nontender to palpation, normoactive bowel sounds  MUSCULOSKELETAL:  No clubbing or cyanosis of digits; no joint swelling or tenderness to palpation  PSYCH: A+O to person, place, and time; affect appropriate  NEUROLOGY: moving all extremities, strength grossly intact  SKIN: No rashes; no palpable lesions    LABS:                        9.6    9.51  )-----------( 235      ( 18 Apr 2025 07:10 )             30.4     04-18    137  |  100  |  16  ----------------------------<  81  3.6   |  23  |  0.96    Ca    9.4      18 Apr 2025 07:10  Phos  4.0     04-18  Mg     2.0     04-18            Urinalysis Basic - ( 18 Apr 2025 07:10 )    Color: x / Appearance: x / SG: x / pH: x  Gluc: 81 mg/dL / Ketone: x  / Bili: x / Urobili: x   Blood: x / Protein: x / Nitrite: x   Leuk Esterase: x / RBC: x / WBC x   Sq Epi: x / Non Sq Epi: x / Bacteria: x          RADIOLOGY & ADDITIONAL TESTS:  Results Reviewed:   Imaging Personally Reviewed:  Electrocardiogram Personally Reviewed:    COORDINATION OF CARE:  Care Discussed with Consultants/Other Providers [Y/N]: neurosurgery   Prior or Outpatient Records Reviewed [Y/N]:

## 2025-04-19 NOTE — PROGRESS NOTE ADULT - SUBJECTIVE AND OBJECTIVE BOX
DATE OF SERVICE: 04-19-25 @ 14:07    Patient is a 55y old  Male who presents with a chief complaint of Patient was admitted for SEEG placement for seizure ficus mapping. (19 Apr 2025 10:03)      INTERVAL HISTORY: NAD    REVIEW OF SYSTEMS:  CONSTITUTIONAL: No weakness  EYES/ENT: No visual changes;  No throat pain   NECK: No pain or stiffness  RESPIRATORY: No cough, wheezing; No shortness of breath  CARDIOVASCULAR: No chest pain or palpitations  GASTROINTESTINAL: No abdominal  pain. No nausea, vomiting, or hematemesis  GENITOURINARY: No dysuria, frequency or hematuria  NEUROLOGICAL: No stroke like symptoms  SKIN: No rashes  MEDICATIONS:  amLODIPine   Tablet 10 milliGRAM(s) Oral daily  hydrochlorothiazide 12.5 milliGRAM(s) Oral daily        PHYSICAL EXAM:  T(C): 37.2 (04-19-25 @ 12:01), Max: 37.2 (04-18-25 @ 17:06)  HR: 69 (04-19-25 @ 12:01) (64 - 131)  BP: 115/66 (04-19-25 @ 12:01) (115/66 - 198/96)  RR: 18 (04-19-25 @ 12:01) (18 - 18)  SpO2: 99% (04-19-25 @ 12:01) (89% - 100%)  Wt(kg): --  I&O's Summary    18 Apr 2025 07:01  -  19 Apr 2025 07:00  --------------------------------------------------------  IN: 639 mL / OUT: 855 mL / NET: -216 mL    19 Apr 2025 07:01  -  19 Apr 2025 14:07  --------------------------------------------------------  IN: 240 mL / OUT: 250 mL / NET: -10 mL          Appearance: In no distress	  HEENT:    PERRL, EOMI	  Cardiovascular:  S1 S2, No JVD  Respiratory: Lungs clear to auscultation	  Gastrointestinal:  Soft, Non-tender, + BS	  Vascularature:  No edema of LE  Psychiatric: Appropriate affect   Neuro: no acute focal deficits                               9.6    9.51  )-----------( 235      ( 18 Apr 2025 07:10 )             30.4     04-18    137  |  100  |  16  ----------------------------<  81  3.6   |  23  |  0.96    Ca    9.4      18 Apr 2025 07:10  Phos  4.0     04-18  Mg     2.0     04-18          Labs personally reviewed      ASSESSMENT/PLAN: 	      54 y/o M with PMHx significant for Type A aortic dissection s/p AV resuspension, ascending aorta and hemiarch replacement, CAD, CABG x 1 (5/2013), s/p Aortic Valve Replacement, s/p TEVAR (05/2023), PE (s/p IVC filter, subsequent removal by Dr. Ellison 9/13/23, not on AC), necrotizing pancreatitis s/p exp lap 5/31/23, Symptomatic Anemia s/p multiple Blood Transfusions, 1 unit transfused last week, Seizure disorder (2018-present), reports he has been experiencing multiple seizure episodes per month, each occurrence lasting a few seconds, described as unresponsive, staring, drooling. not responding to medication. Patient is now scheduled for Stage 1 Right Stereo EEG for Implantation of Electrodes with Dr. Rowe on 04/14/2025.    Problem 1: Cardiac Risk Stratification   - Pt with some fatigue on exertion  - No evidence of tachy-bailey arrythmia  - Reviewed TTE from 10/2024 with normal biventricular function, repeat ordered  - EKG 4/18 - NSR 71 bpm  - Mod risk for mod risk surgery, pending repeat TTE    Problem 2: CAD  - Hx CABG in 2023  - C/w ASA & Atorvastatin 20mg     Problem 3: Hx Aortic Dissection & Valve replacement  - TEVAR 3/3023  - TTE 10/2024 = LVEF 65%, mod concentric LVH, normal RV size and systolic function. severely dilated LA. Bioprosthetic valve in aortic position. no evidence of AR. Probable moderate MR. Moderate TR. pHTN, PASP 54  - Pending repeat TTE    Problem 4: HTN  - Well controlled  - C/w Amlodipine 10mg  - C/w HCTZ 12.5    Problem 5: Epilepsy  - Neurology and Neurosurgery following, appreciate recs  - s/p Insertion of Rt SEEG electrode lead 4/14, pending explant electrodes    Problem 6: DVT prophylaxis  - Lovenox       MARCUS Dias DO [facility]  Cardiovascular Medicine  [address] [facility], [address]  Office: [phone]  Available via call/text on Microsoft Teams

## 2025-04-19 NOTE — PROGRESS NOTE ADULT - SUBJECTIVE AND OBJECTIVE BOX
HE PATIENT WAS SEEN AND EXAMINED BY ME WITH THE HOUSESTAFF DURING MORNING ROUNDS.     HPI:  54 y/o M with PMHx significant for Type A aortic dissection s/p AV resuspension, ascending aorta and hemiarch replacement, CAD, CABG x 1 (5/2013), s/p Aortic Valve Replacement, s/p TEVAR (05/2023), PE (s/p IVC filter, subsequent removal by Dr. Ellison 9/13/23, not on AC), necrotizing pancreatitis s/p exp lap 5/31/23, Symptomatic Anemia s/p multiple Blood Transfusions, 1 unit transfused last week, Seizure disorder (2018-present), reports he has been experiencing multiple seizure episodes per month, each occurrence lasting a few seconds, described as unresponsive, staring, drooling. not responding to medication. Patient is now scheduled for Stage 1 Right Stereo EEG for Implantation of Electrodes with Dr. Rowe on 04/14/2025. (11 Apr 2025 10:17)    SUBJECTIVE: Pt with 6 seizures total 4/18 and was restarted on medications. This AM stable and sitting comfortably in bed eating breakfast.     acetaminophen     Tablet .. 650 milliGRAM(s) Oral every 6 hours PRN  acetaminophen   IVPB .. 1000 milliGRAM(s) IV Intermittent every 6 hours  amLODIPine   Tablet 10 milliGRAM(s) Oral daily  atorvastatin 20 milliGRAM(s) Oral at bedtime  bisacodyl 5 milliGRAM(s) Oral daily PRN  bisacodyl 5 milliGRAM(s) Oral every 12 hours  enoxaparin Injectable 40 milliGRAM(s) SubCutaneous <User Schedule>  hydrochlorothiazide 12.5 milliGRAM(s) Oral daily  HYDROmorphone   Tablet 2 milliGRAM(s) Oral every 4 hours PRN  HYDROmorphone   Tablet 4 milliGRAM(s) Oral every 4 hours PRN  HYDROmorphone  Injectable 0.5 milliGRAM(s) IV Push every 6 hours PRN  lacosamide Injectable 100 milliGRAM(s) IV Push once PRN  LORazepam   Injectable 1 milliGRAM(s) IV Push once PRN  melatonin 3 milliGRAM(s) Oral at bedtime PRN  methocarbamol 750 milliGRAM(s) Oral every 8 hours  ondansetron Injectable 4 milliGRAM(s) IV Push every 6 hours PRN  polyethylene glycol 3350 17 Gram(s) Oral two times a day  senna 2 Tablet(s) Oral at bedtime      Physical Exam: Neurological Exam:  	Mental Status: Oriented to self, date and place. Attention intact.  	Cranial Nerves: PERRL, EOMI, no nystagmus or diplopia. No facial asymmetry.  	Motor: moving all 4 extremities equally w 5/5 strength  	Tone: normal.         	Pronator drift: none                 	Tremor: No resting, postural or action tremor. No myoclonus.  	Sensation: intact to light touch    LABS:                        9.6    9.51  )-----------( 235      ( 18 Apr 2025 07:10 )             30.4    04-18    137  |  100  |  16  ----------------------------<  81  3.6   |  23  |  0.96    Ca    9.4      18 Apr 2025 07:10  Phos  4.0     04-18  Mg     2.0     04-18    IMAGING:     MR Head No Cont (04.15.25 @ 12:39)  Right-sided frontal and temporal deep EEG leads. No hemorrhage or evidence of edema.    VA Duplex Lower Ext Vein Scan, Bilat (04.14.25 @ 19:22) >  No evidence of deep venous thrombosis in either lower extremity.  Statement above in regards to the venous segments able to be diagnostically examined as described.    CT Head No Cont (04.14.25 @ 14:42) >  Interval placement of multiple right sided depth electrodes. Streak artifact from electrodes markedly limits evaluation.  No hydrocephalus, midline shift, or effacement of the basal cisterns.  No gross evidence for the presence of large intracranialhemorrhage or edema.

## 2025-04-19 NOTE — PROGRESS NOTE ADULT - ASSESSMENT
55M, PMH for Type A aortic dissection s/p AV resuspension, ascending aorta and hemiarch replacement, CAD, CABG x 1 (5/2013), s/p Aortic Valve Replacement, s/p TEVAR (05/2023), PE (s/p IVC filter, subsequent removal by Dr. Ellison 9/13/23, not on AC), necrotizing pancreatitis s/p exp lap 5/31/23, Symptomatic Anemia, seizure disorder (2018-present), reports he has been experiencing multiple seizure episodes per month, each occurrence lasting a few seconds, described as unresponsive, staring, drooling. not responding to medication. Admitted for scheduled Stage 1 Right Stereo EEG for Implantation of Electrodes with Dr. Rowe on 04/14/2025.    PLAN:    - Started on Briviact 100 mg BID on 4/18  - Started on Lacosamide 200 mg TID on 4/18  - Follow up with neuro surgery for further recs  - Continue Norvasc 10mg daily, HCTZ 12.5 daily and Lipitor  - Rescues: first line ativan 1mg IV for GTC lasting >5min, second line vimpat 100mg IV  - Please make NPO at midnight on sunday 4/20    CORE MEASURES:        AED levels [] Sent [] Pending [] Resulted     LFTs [x] normal [] elevated      Plan and education provided to [x] patient []family at bedside [] awaiting for family     Seizure Semiology  [] Tonic clonic  [] Clonic  [] Tonic  [] Unresponsive  [] Focal with impaired awareness  [] Focal without impaired awareness    Seen with Dr Zafar

## 2025-04-20 LAB
ANION GAP SERPL CALC-SCNC: 14 MMOL/L — SIGNIFICANT CHANGE UP (ref 5–17)
APTT BLD: 32.8 SEC — SIGNIFICANT CHANGE UP (ref 24.5–35.6)
BLD GP AB SCN SERPL QL: NEGATIVE — SIGNIFICANT CHANGE UP
BUN SERPL-MCNC: 18 MG/DL — SIGNIFICANT CHANGE UP (ref 7–23)
CALCIUM SERPL-MCNC: 9.6 MG/DL — SIGNIFICANT CHANGE UP (ref 8.4–10.5)
CHLORIDE SERPL-SCNC: 106 MMOL/L — SIGNIFICANT CHANGE UP (ref 96–108)
CO2 SERPL-SCNC: 21 MMOL/L — LOW (ref 22–31)
CREAT SERPL-MCNC: 0.96 MG/DL — SIGNIFICANT CHANGE UP (ref 0.5–1.3)
EGFR: 93 ML/MIN/1.73M2 — SIGNIFICANT CHANGE UP
EGFR: 93 ML/MIN/1.73M2 — SIGNIFICANT CHANGE UP
GLUCOSE SERPL-MCNC: 84 MG/DL — SIGNIFICANT CHANGE UP (ref 70–99)
HCT VFR BLD CALC: 31.6 % — LOW (ref 39–50)
HGB BLD-MCNC: 10.2 G/DL — LOW (ref 13–17)
INR BLD: 1.06 RATIO — SIGNIFICANT CHANGE UP (ref 0.85–1.16)
MCHC RBC-ENTMCNC: 28.9 PG — SIGNIFICANT CHANGE UP (ref 27–34)
MCHC RBC-ENTMCNC: 32.3 G/DL — SIGNIFICANT CHANGE UP (ref 32–36)
MCV RBC AUTO: 89.5 FL — SIGNIFICANT CHANGE UP (ref 80–100)
NRBC BLD AUTO-RTO: 0 /100 WBCS — SIGNIFICANT CHANGE UP (ref 0–0)
PLATELET # BLD AUTO: 259 K/UL — SIGNIFICANT CHANGE UP (ref 150–400)
POTASSIUM SERPL-MCNC: 3.6 MMOL/L — SIGNIFICANT CHANGE UP (ref 3.5–5.3)
POTASSIUM SERPL-SCNC: 3.6 MMOL/L — SIGNIFICANT CHANGE UP (ref 3.5–5.3)
PROTHROM AB SERPL-ACNC: 12.2 SEC — SIGNIFICANT CHANGE UP (ref 9.9–13.4)
RBC # BLD: 3.53 M/UL — LOW (ref 4.2–5.8)
RBC # FLD: 21.2 % — HIGH (ref 10.3–14.5)
RH IG SCN BLD-IMP: POSITIVE — SIGNIFICANT CHANGE UP
SODIUM SERPL-SCNC: 141 MMOL/L — SIGNIFICANT CHANGE UP (ref 135–145)
WBC # BLD: 7.29 K/UL — SIGNIFICANT CHANGE UP (ref 3.8–10.5)
WBC # FLD AUTO: 7.29 K/UL — SIGNIFICANT CHANGE UP (ref 3.8–10.5)

## 2025-04-20 PROCEDURE — 93306 TTE W/DOPPLER COMPLETE: CPT | Mod: 26

## 2025-04-20 PROCEDURE — 95720 EEG PHY/QHP EA INCR W/VEEG: CPT

## 2025-04-20 PROCEDURE — 99231 SBSQ HOSP IP/OBS SF/LOW 25: CPT

## 2025-04-20 RX ADMIN — BRIVARACETAM 240 MILLIGRAM(S): 75 TABLET, FILM COATED ORAL at 05:37

## 2025-04-20 RX ADMIN — Medication 4 MILLIGRAM(S): at 16:21

## 2025-04-20 RX ADMIN — BRIVARACETAM 240 MILLIGRAM(S): 75 TABLET, FILM COATED ORAL at 17:19

## 2025-04-20 RX ADMIN — Medication 2 MILLIGRAM(S): at 19:45

## 2025-04-20 RX ADMIN — METHOCARBAMOL 750 MILLIGRAM(S): 500 TABLET, FILM COATED ORAL at 21:04

## 2025-04-20 RX ADMIN — AMLODIPINE BESYLATE 10 MILLIGRAM(S): 10 TABLET ORAL at 05:02

## 2025-04-20 RX ADMIN — Medication 0.5 MILLIGRAM(S): at 13:17

## 2025-04-20 RX ADMIN — LACOSAMIDE 140 MILLIGRAM(S): 150 TABLET, FILM COATED ORAL at 21:04

## 2025-04-20 RX ADMIN — Medication 0.5 MILLIGRAM(S): at 13:47

## 2025-04-20 RX ADMIN — Medication 4 MILLIGRAM(S): at 07:54

## 2025-04-20 RX ADMIN — LACOSAMIDE 140 MILLIGRAM(S): 150 TABLET, FILM COATED ORAL at 11:20

## 2025-04-20 RX ADMIN — Medication 4 MILLIGRAM(S): at 08:30

## 2025-04-20 RX ADMIN — Medication 2 MILLIGRAM(S): at 20:30

## 2025-04-20 RX ADMIN — Medication 650 MILLIGRAM(S): at 05:36

## 2025-04-20 RX ADMIN — LACOSAMIDE 140 MILLIGRAM(S): 150 TABLET, FILM COATED ORAL at 04:51

## 2025-04-20 RX ADMIN — Medication 650 MILLIGRAM(S): at 06:00

## 2025-04-20 RX ADMIN — Medication 4 MILLIGRAM(S): at 17:00

## 2025-04-20 RX ADMIN — ATORVASTATIN CALCIUM 20 MILLIGRAM(S): 80 TABLET, FILM COATED ORAL at 21:04

## 2025-04-20 RX ADMIN — METHOCARBAMOL 750 MILLIGRAM(S): 500 TABLET, FILM COATED ORAL at 11:20

## 2025-04-20 RX ADMIN — METHOCARBAMOL 750 MILLIGRAM(S): 500 TABLET, FILM COATED ORAL at 05:02

## 2025-04-20 RX ADMIN — Medication 650 MILLIGRAM(S): at 00:00

## 2025-04-20 NOTE — PROGRESS NOTE ADULT - ASSESSMENT
HPI:  54 y/o M with PMHx significant for Type A aortic dissection s/p AV resuspension, ascending aorta and hemiarch replacement, CAD, CABG x 1 (5/2013), s/p Aortic Valve Replacement, s/p TEVAR (05/2023), PE (s/p IVC filter, subsequent removal by Dr. Ellison 9/13/23, not on AC), necrotizing pancreatitis s/p exp lap 5/31/23, Symptomatic Anemia s/p multiple Blood Transfusions, 1 unit transfused last week, Seizure disorder (2018-present), reports he has been experiencing multiple seizure episodes per month, each occurrence lasting a few seconds, described as unresponsive, staring, drooling. not responding to medication. Patient is now scheduled for Stage 1 Right Stereo EEG for Implantation of Electrodes with Dr. Rowe on 04/14/2025. (11 Apr 2025 10:17)    Procedure: s/p Insertion of Rt SEEG electrode lead 4/14      NEURO:  - POD6 sEEG with insertion of 11 depth electrodes on Right side.  - vEEG 4/15 right sided spikes  - Postop CTH w/o significant heme.   - Postop MRI stable  -Pain control: Tylenol, Oxy changed to dilaudid oral 4/16 with minor improvement  -Patient is back on briviact 100 bid and vimpat 200 TID   - plan is to remove electrodes on 4/21   -Continue on robaxin     PULM:  SpO2 goal >94%.   - Incentive spirometry.     CV:  Hemodynamically stable   - Continue home meds amlodipine 10 daily, Lipitor 20 daily, HCTZ 12.5 daily  - echo pending, to be done today  - appreciate cardiology follow up     GI:  Diet: Regular   GI prophylaxis [X] not indicated [] PPI [] other:  Bowel regimen [] colace [X] senna [x] other: Miralax, bm on 4/19     RENAL:  Fluids: IVL  voiding  replete electrolytes as needed    ENDO:   Goal euglycemia (-180)    HEME/ONC:  VTE prophylaxis: SCDs, SQL  BLE Dopp no DVTs.    ID:  - Monitor for fevers.    appreciate neurology follow up   Will discuss with Dr Rowe  -35 minutes spent for patient care and answered all questions.    -will discuss with Dr. Rowe   -Kids Calendar [id number]        HPI:  56 y/o M with PMHx significant for Type A aortic dissection s/p AV resuspension, ascending aorta and hemiarch replacement, CAD, CABG x 1 (5/2013), s/p Aortic Valve Replacement, s/p TEVAR (05/2023), PE (s/p IVC filter, subsequent removal by Dr. Ellison 9/13/23, not on AC), necrotizing pancreatitis s/p exp lap 5/31/23, Symptomatic Anemia s/p multiple Blood Transfusions, 1 unit transfused last week, Seizure disorder (2018-present), reports he has been experiencing multiple seizure episodes per month, each occurrence lasting a few seconds, described as unresponsive, staring, drooling. not responding to medication. Patient is now scheduled for Stage 1 Right Stereo EEG for Implantation of Electrodes with Dr. Rowe on 04/14/2025. (11 Apr 2025 10:17)    Procedure: s/p Insertion of Rt SEEG electrode lead 4/14      NEURO:  - POD6 sEEG with insertion of 11 depth electrodes on Right side.  - vEEG 4/15 right sided spikes  - Postop CTH w/o significant heme.   - Postop MRI stable  -Pain control: Tylenol, Oxy changed to dilaudid oral 4/16 with minor improvement  -Patient is back on briviact 100 bid and vimpat 200 TID   - plan is to remove electrodes on 4/21, PREOP  -Continue on robaxin     PULM:  SpO2 goal >94%.   - Incentive spirometry.     CV:  Hemodynamically stable   - Continue home meds amlodipine 10 daily, Lipitor 20 daily, HCTZ 12.5 daily  - echo pending, to be done today  - appreciate cardiology follow up     GI:  Diet: Regular, NPO after midnight  GI prophylaxis [X] not indicated [] PPI [] other:  Bowel regimen [] colace [X] senna [x] other: Miralax, bm on 4/19     RENAL:  Fluids: IVL  voiding  replete electrolytes as needed    ENDO:   Goal euglycemia (-180)    HEME/ONC:  VTE prophylaxis: SCDs, SQL  BLE Dopp no DVTs.    ID:  - Monitor for fevers.    appreciate neurology follow up   Will discuss with Dr Rowe  -35 minutes spent for patient care and answered all questions.    -will discuss with Dr. Rowe   -Subtextual [id number]

## 2025-04-20 NOTE — PROGRESS NOTE ADULT - ASSESSMENT
55M, PMH for Type A aortic dissection s/p AV resuspension, ascending aorta and hemiarch replacement, CAD, CABG x 1 (5/2013), s/p Aortic Valve Replacement, s/p TEVAR (05/2023), PE (s/p IVC filter, subsequent removal by Dr. Ellison 9/13/23, not on AC), necrotizing pancreatitis s/p exp lap 5/31/23, Symptomatic Anemia, seizure disorder (2018-present), reports he has been experiencing multiple seizure episodes per month, each occurrence lasting a few seconds, described as unresponsive, staring, drooling. not responding to medication. Admitted for scheduled Stage 1 Right Stereo EEG for Implantation of Electrodes with Dr. Rowe on 04/14/2025.    PLAN:  - Started on brivaracetam 100 mg BID on 4/18  - Started on lacosamide 200 mg TID on 4/18  - Rescues: first-line lorazepam 1mg IV for GTC lasting >5min, second-line lacosamide 100mg IV  - NPO at midnight for sEEG lead explantation on 4/21  - Rest of management per Neurosurgery (primary team)    Patient seen and d/w ***. 55M, PMH for Type A aortic dissection s/p AV resuspension, ascending aorta and hemiarch replacement, CAD, CABG x 1 (5/2013), s/p Aortic Valve Replacement, s/p TEVAR (05/2023), PE (s/p IVC filter, subsequent removal by Dr. Ellison 9/13/23, not on AC), necrotizing pancreatitis s/p exp lap 5/31/23, Symptomatic Anemia, seizure disorder (2018-present), reports he has been experiencing multiple seizure episodes per month, each occurrence lasting a few seconds, described as unresponsive, staring, drooling. not responding to medication. Admitted for scheduled Stage 1 Right Stereo EEG for Implantation of Electrodes with Dr. Rowe on 04/14/2025.    PLAN:  - Started on brivaracetam 100 mg BID on 4/18  - Started on lacosamide 200 mg TID on 4/18  - Rescues: first-line lorazepam 1mg IV for GTC lasting >5min, second-line lacosamide 100mg IV  - NPO at midnight for sEEG lead explantation on 4/21  - Rest of management per Neurosurgery (primary team)    Patient seen and d/w Dr. Zafar.

## 2025-04-20 NOTE — PROGRESS NOTE ADULT - SUBJECTIVE AND OBJECTIVE BOX
DATE OF SERVICE: 04-20-25 @ 12:16    Patient is a 55y old  Male who presents with a chief complaint of Patient was admitted for SEEG placement for seizure ficus mapping. (20 Apr 2025 07:49)      INTERVAL HISTORY: NAD     REVIEW OF SYSTEMS:  CONSTITUTIONAL: No weakness  EYES/ENT: No visual changes;  No throat pain   NECK: No pain or stiffness  RESPIRATORY: No cough, wheezing; No shortness of breath  CARDIOVASCULAR: No chest pain or palpitations  GASTROINTESTINAL: No abdominal  pain. No nausea, vomiting, or hematemesis  GENITOURINARY: No dysuria, frequency or hematuria  NEUROLOGICAL: No stroke like symptoms  SKIN: No rashes      	  MEDICATIONS:  amLODIPine   Tablet 10 milliGRAM(s) Oral daily  hydrochlorothiazide 12.5 milliGRAM(s) Oral daily        PHYSICAL EXAM:  T(C): 36.9 (04-20-25 @ 08:03), Max: 37.2 (04-19-25 @ 19:43)  HR: 70 (04-20-25 @ 08:03) (70 - 77)  BP: 137/81 (04-20-25 @ 08:03) (119/71 - 137/81)  RR: 18 (04-20-25 @ 08:03) (18 - 18)  SpO2: 100% (04-20-25 @ 08:03) (98% - 100%)  Wt(kg): --  I&O's Summary    19 Apr 2025 07:01  -  20 Apr 2025 07:00  --------------------------------------------------------  IN: 420 mL / OUT: 950 mL / NET: -530 mL    20 Apr 2025 07:01  -  20 Apr 2025 12:16  --------------------------------------------------------  IN: 180 mL / OUT: 200 mL / NET: -20 mL          Appearance: In no distress	  HEENT:    PERRL, EOMI	  Cardiovascular:  S1 S2, No JVD  Respiratory: Lungs clear to auscultation	  Gastrointestinal:  Soft, Non-tender, + BS	  Vascularature:  No edema of LE  Psychiatric: Appropriate affect   Neuro: no acute focal deficits                               10.2   7.29  )-----------( 259      ( 20 Apr 2025 05:32 )             31.6     04-20    141  |  106  |  18  ----------------------------<  84  3.6   |  21[L]  |  0.96    Ca    9.6      20 Apr 2025 05:29          Labs personally reviewed      ASSESSMENT/PLAN: 	      56 y/o M with PMHx significant for Type A aortic dissection s/p AV resuspension, ascending aorta and hemiarch replacement, CAD, CABG x 1 (5/2013), s/p Aortic Valve Replacement, s/p TEVAR (05/2023), PE (s/p IVC filter, subsequent removal by Dr. Ellison 9/13/23, not on AC), necrotizing pancreatitis s/p exp lap 5/31/23, Symptomatic Anemia s/p multiple Blood Transfusions, 1 unit transfused last week, Seizure disorder (2018-present), reports he has been experiencing multiple seizure episodes per month, each occurrence lasting a few seconds, described as unresponsive, staring, drooling. not responding to medication. Patient is now scheduled for Stage 1 Right Stereo EEG for Implantation of Electrodes with Dr. Rowe on 04/14/2025.    Problem 1: Cardiac Risk Stratification   - Pt with some fatigue on exertion  - No evidence of tachy-bailey arrythmia  - Reviewed TTE from 10/2024 with normal biventricular function, repeat 4/20 unchanged biV function w/ LVOT obstruction  - EKG 4/18 - NSR 71 bpm  - Mod risk for mod risk surgery. No contraindication for surgery.     Problem 2: CAD  - Hx CABG in 2023  - C/w ASA & Atorvastatin 20mg     Problem 3: Hx Aortic Dissection & Valve replacement  - TEVAR 3/3023  - TTE 10/2024 = LVEF 65%, mod concentric LVH, normal RV size and systolic function. severely dilated LA. Bioprosthetic valve in aortic position. no evidence of AR. Probable moderate MR. Moderate TR. pHTN, PASP 54  - TTE 4/20/25 preserved EF, chordal SHAJI w/ severe LVOT obstruction, dyskinetic apex    - Avoid afterload reducing agents     Problem 4: HTN  - Well controlled  - C/w Amlodipine 10mg  - C/w HCTZ 12.5    Problem 5: Epilepsy  - Neurology and Neurosurgery following, appreciate recs  - s/p Insertion of Rt SEEG electrode lead 4/14, pending explant electrodes    Problem 6: DVT prophylaxis  - Lovenox         MARCUS Dias DO [facility]  Cardiovascular Medicine  [address], [address]  Office: [phone]  Available via call/text on [employer] Teams  DATE OF SERVICE: 04-20-25 @ 12:16    Patient is a 55y old  Male who presents with a chief complaint of Patient was admitted for SEEG placement for seizure ficus mapping. (20 Apr 2025 07:49)      INTERVAL HISTORY: NAD     REVIEW OF SYSTEMS:  CONSTITUTIONAL: No weakness  EYES/ENT: No visual changes;  No throat pain   NECK: No pain or stiffness  RESPIRATORY: No cough, wheezing; No shortness of breath  CARDIOVASCULAR: No chest pain or palpitations  GASTROINTESTINAL: No abdominal  pain. No nausea, vomiting, or hematemesis  GENITOURINARY: No dysuria, frequency or hematuria  NEUROLOGICAL: No stroke like symptoms  SKIN: No rashes      	  MEDICATIONS:  amLODIPine   Tablet 10 milliGRAM(s) Oral daily  hydrochlorothiazide 12.5 milliGRAM(s) Oral daily        PHYSICAL EXAM:  T(C): 36.9 (04-20-25 @ 08:03), Max: 37.2 (04-19-25 @ 19:43)  HR: 70 (04-20-25 @ 08:03) (70 - 77)  BP: 137/81 (04-20-25 @ 08:03) (119/71 - 137/81)  RR: 18 (04-20-25 @ 08:03) (18 - 18)  SpO2: 100% (04-20-25 @ 08:03) (98% - 100%)  Wt(kg): --  I&O's Summary    19 Apr 2025 07:01  -  20 Apr 2025 07:00  --------------------------------------------------------  IN: 420 mL / OUT: 950 mL / NET: -530 mL    20 Apr 2025 07:01  -  20 Apr 2025 12:16  --------------------------------------------------------  IN: 180 mL / OUT: 200 mL / NET: -20 mL          Appearance: In no distress	  HEENT:    PERRL, EOMI	  Cardiovascular:  S1 S2, No JVD  Respiratory: Lungs clear to auscultation	  Gastrointestinal:  Soft, Non-tender, + BS	  Vascularature:  No edema of LE  Psychiatric: Appropriate affect   Neuro: no acute focal deficits                               10.2   7.29  )-----------( 259      ( 20 Apr 2025 05:32 )             31.6     04-20    141  |  106  |  18  ----------------------------<  84  3.6   |  21[L]  |  0.96    Ca    9.6      20 Apr 2025 05:29          Labs personally reviewed      ASSESSMENT/PLAN: 	      54 y/o M with PMHx significant for Type A aortic dissection s/p AV resuspension, ascending aorta and hemiarch replacement, CAD, CABG x 1 (5/2013), s/p Aortic Valve Replacement, s/p TEVAR (05/2023), PE (s/p IVC filter, subsequent removal by Dr. Ellison 9/13/23, not on AC), necrotizing pancreatitis s/p exp lap 5/31/23, Symptomatic Anemia s/p multiple Blood Transfusions, 1 unit transfused last week, Seizure disorder (2018-present), reports he has been experiencing multiple seizure episodes per month, each occurrence lasting a few seconds, described as unresponsive, staring, drooling. not responding to medication. Patient is now scheduled for Stage 1 Right Stereo EEG for Implantation of Electrodes with Dr. Rowe on 04/14/2025.    Problem 1: Cardiac Risk Stratification   - Pt with some fatigue on exertion  - No evidence of tachy-bailey arrythmia  - Reviewed TTE from 10/2024 with normal biventricular function, repeat 4/20 unchanged biV function w/ LVOT obstruction  - EKG 4/18 - NSR 71 bpm  - Elevated risk for mod risk surgery. No cardiac contraindication to proceed  - Avoid afterload reducing agents if possible given LVOT obstruction     Problem 2: CAD  - Hx CABG in 2023  - C/w ASA & Atorvastatin 20mg     Problem 3: Hx Aortic Dissection & Valve replacement  - TEVAR 3/3023  - TTE 10/2024 = LVEF 65%, mod concentric LVH, normal RV size and systolic function. severely dilated LA. Bioprosthetic valve in aortic position. no evidence of AR. Probable moderate MR. Moderate TR. pHTN, PASP 54  - TTE 4/20/25 preserved EF, chordal SHAJI w/ severe LVOT obstruction, dyskinetic apex    - Avoid afterload reducing agents     Problem 4: HTN  - Well controlled  - C/w Amlodipine 10mg  - C/w HCTZ 12.5    Problem 5: Epilepsy  - Neurology and Neurosurgery following, appreciate recs  - s/p Insertion of Rt SEEG electrode lead 4/14, pending explant electrodes    Problem 6: DVT prophylaxis  - Lovenox         MARCUS Dias DO [facility]  Cardiovascular Medicine  [address] [place] [address], [address]  Office: [phone]  Available via call/text on [employer] Teams

## 2025-04-20 NOTE — PROGRESS NOTE ADULT - SUBJECTIVE AND OBJECTIVE BOX
SUBJECTIVE:   Patient was seen and evaluated at bedside. Patient is resting in bed and is in no new acute distress.   OVERNIGHT EVENTS:   none   ICU Vital Signs Last 24 Hrs  T(C): 36.7 (20 Apr 2025 04:00), Max: 37.2 (19 Apr 2025 12:01)  T(F): 98.1 (20 Apr 2025 04:00), Max: 99 (19 Apr 2025 12:01)  HR: 71 (20 Apr 2025 04:00) (64 - 77)  BP: 124/83 (20 Apr 2025 04:00) (115/66 - 136/81)  BP(mean): --  ABP: --  ABP(mean): --  RR: 18 (20 Apr 2025 04:00) (18 - 18)  SpO2: 100% (20 Apr 2025 04:00) (98% - 100%)    O2 Parameters below as of 20 Apr 2025 04:00  Patient On (Oxygen Delivery Method): room air        PHYSICAL EXAM:    General: No Acute Distress     Neurological: Awake, alert oriented to person, place and time, Following Commands, PERRL, EOMI, Face Symmetrical, Speech Fluent, Moving all extremities, Muscle Strength normal in all four extremities, No Drift, Sensation to Light Touch Intact    Pulmonary: Clear to Auscultation, No Rales, No Rhonchi, No Wheezes     Cardiovascular: S1, S2, Regular Rate and Rhythm     Gastrointestinal: Soft, Nontender, Nondistended     Incision: clean and dry     LABS:             04-20    141  |  106  |  18  ----------------------------<  84  3.6   |  21[L]  |  0.96    Ca    9.6      20 Apr 2025 05:29                          10.2   7.29  )-----------( 259      ( 20 Apr 2025 05:32 )             31.6       I&O's Summary    19 Apr 2025 07:01  -  20 Apr 2025 07:00  --------------------------------------------------------  IN: 420 mL / OUT: 950 mL / NET: -530 mL      MEDICATIONS:  Antibiotics:    Neuro:  acetaminophen     Tablet .. 650 milliGRAM(s) Oral every 6 hours PRN Mild Pain (1 - 3)  brivaracetam  IVPB 100 milliGRAM(s) IV Intermittent every 12 hours  HYDROmorphone   Tablet 2 milliGRAM(s) Oral every 4 hours PRN Moderate Pain (4 - 6)  HYDROmorphone   Tablet 4 milliGRAM(s) Oral every 4 hours PRN Severe Pain (7 - 10)  HYDROmorphone  Injectable 0.5 milliGRAM(s) IV Push every 6 hours PRN breakthrough pain  lacosamide Injectable 100 milliGRAM(s) IV Push once PRN 2nd line therapy, GTC lasting >5 mins refractory to ativan  lacosamide IVPB 200 milliGRAM(s) IV Intermittent <User Schedule>  LORazepam   Injectable 1 milliGRAM(s) IV Push once PRN GTC lasting >5 mins with vital sign changes  melatonin 3 milliGRAM(s) Oral at bedtime PRN Insomnia  methocarbamol 750 milliGRAM(s) Oral every 8 hours  ondansetron Injectable 4 milliGRAM(s) IV Push every 6 hours PRN Nausea and/or Vomiting    Cardiac:  amLODIPine   Tablet 10 milliGRAM(s) Oral daily  hydrochlorothiazide 12.5 milliGRAM(s) Oral daily    Pulm:    GI/:  bisacodyl 5 milliGRAM(s) Oral daily PRN Constipation  polyethylene glycol 3350 17 Gram(s) Oral two times a day  senna 2 Tablet(s) Oral at bedtime    Other:   atorvastatin 20 milliGRAM(s) Oral at bedtime  enoxaparin Injectable 40 milliGRAM(s) SubCutaneous <User Schedule>    DIET: [] Regular [] CCD [] Renal [] Puree [] Dysphagia [] Tube Feeds:   regular   IMAGING:   ACC: [id number] EXAM:  MR BRAIN   ORDERED BY: KALLIE GROVE     PROCEDURE DATE:  04/15/2025          INTERPRETATION:  Clinical indication: Post deep EEG lead placement      Magnetic resonance imaging of the brain was carried out with transaxial,   coronal and sagittal 3-D MP Rage and axial T2-weighted series on a 1.5   Priti magnet.    Comparison is made with the prior CT of 4/14/2025.    There are multiple right-sided frontal and temporal dino hole's through   which deep brain EEG leads are inserted in the right frontal and temporal   region. No hemorrhage or edema is identified. There is no midline shift.   The ventricles are normal in size and position.    IMPRESSION: Right-sided frontal and temporal deep EEG leads. No   hemorrhage or evidence of edema.    --- End of Report ---            AMEYA GILES MD; Attending Radiologist  This document has been electronically signed. Apr 15 2025 12:46PM

## 2025-04-20 NOTE — PROGRESS NOTE ADULT - SUBJECTIVE AND OBJECTIVE BOX
Neurology Progress Note    SUBJECTIVE/OBJECTIVE/INTERVAL EVENTS: Patient seen and examined at bedside w/ neuro attending and team.     INTERVAL HISTORY:  NAEON noted in chart.    This AM, ***    REVIEW OF SYSTEMS: Few questions of a 10-system ROS was performed and is negative except for those items noted above and/or in the HPI.    VITALS & EXAMINATION:  Vital Signs Last 24 Hrs  T(C): 36.7 (20 Apr 2025 04:00), Max: 37.2 (19 Apr 2025 12:01)  T(F): 98.1 (20 Apr 2025 04:00), Max: 99 (19 Apr 2025 12:01)  HR: 71 (20 Apr 2025 04:00) (64 - 77)  BP: 124/83 (20 Apr 2025 04:00) (115/66 - 136/81)  BP(mean): --  RR: 18 (20 Apr 2025 04:00) (18 - 18)  SpO2: 100% (20 Apr 2025 04:00) (98% - 100%)    Parameters below as of 20 Apr 2025 04:00  Patient On (Oxygen Delivery Method): room air    Physical Exam:   Neurological Exam:  	Mental Status: Oriented to self, date and place. Attention intact.  	Cranial Nerves: PERRL, EOMI, no nystagmus or diplopia. No facial asymmetry.  	Motor: moving all 4 extremities equally w 5/5 strength  	Tone: normal.         	Pronator drift: none                 	Tremor: No resting, postural or action tremor. No myoclonus.  	Sensation: intact to light touch    LABORATORY:  CBC                       10.2   7.29  )-----------( 259      ( 20 Apr 2025 05:32 )             31.6     Chem 04-20    141  |  106  |  18  ----------------------------<  84  3.6   |  21[L]  |  0.96    Ca    9.6      20 Apr 2025 05:29      LFTs   Coagulopathy PT/INR - ( 20 Apr 2025 05:33 )   PT: 12.2 sec;   INR: 1.06 ratio         PTT - ( 20 Apr 2025 05:33 )  PTT:32.8 sec  Lipid Panel   A1c   Cardiac enzymes     U/A Urinalysis Basic - ( 20 Apr 2025 05:29 )    Color: x / Appearance: x / SG: x / pH: x  Gluc: 84 mg/dL / Ketone: x  / Bili: x / Urobili: x   Blood: x / Protein: x / Nitrite: x   Leuk Esterase: x / RBC: x / WBC x   Sq Epi: x / Non Sq Epi: x / Bacteria: x      CSF  Immunological  Other    STUDIES & IMAGING: (EEG, CT, MR, U/S, TTE/BARBIE):  MR Head No Cont (04.15.25 @ 12:39)  Right-sided frontal and temporal deep EEG leads. No hemorrhage or evidence of edema.    VA Duplex Lower Ext Vein Scan, Bilat (04.14.25 @ 19:22) >  No evidence of deep venous thrombosis in either lower extremity.  Statement above in regards to the venous segments able to be diagnostically examined as described.    CT Head No Cont (04.14.25 @ 14:42) >  Interval placement of multiple right sided depth electrodes. Streak artifact from electrodes markedly limits evaluation.  No hydrocephalus, midline shift, or effacement of the basal cisterns.  No gross evidence for the presence of large intracranialhemorrhage or edema.    Start Date: 4/14/2025 – Day 1                  Start Time – 19:02 – 08:00            Duration – 12:29    Artifacts:  electrodes not recording well at: RTi7,RTi11    Non-Epileptiform Findings:   Focal delta at: RDa9-11, RTi9-10, RTP5-7, RTx9-11  Physiologic appearing beta/alpha at:    Interictal abnormalities:  -Frequent spikes, focal, maximum RDa1-3 / RDh1    Ictal abnormalities:   None Neurology Progress Note    SUBJECTIVE/OBJECTIVE/INTERVAL EVENTS: Patient seen and examined at bedside w/ neuro attending and team.     INTERVAL HISTORY:  NAEON noted in chart. EEG improved with medications added back on.    This AM, family at bedside. Patient feels well. Plan for explant tomorrow.    REVIEW OF SYSTEMS: Few questions of a 10-system ROS was performed and is negative except for those items noted above and/or in the HPI.    VITALS & EXAMINATION:  Vital Signs Last 24 Hrs  T(C): 36.7 (20 Apr 2025 04:00), Max: 37.2 (19 Apr 2025 12:01)  T(F): 98.1 (20 Apr 2025 04:00), Max: 99 (19 Apr 2025 12:01)  HR: 71 (20 Apr 2025 04:00) (64 - 77)  BP: 124/83 (20 Apr 2025 04:00) (115/66 - 136/81)  BP(mean): --  RR: 18 (20 Apr 2025 04:00) (18 - 18)  SpO2: 100% (20 Apr 2025 04:00) (98% - 100%)    Parameters below as of 20 Apr 2025 04:00  Patient On (Oxygen Delivery Method): room air    Physical Exam:   Neurological Exam:  	Mental Status: Oriented to self, date and place. Attention intact.  	Cranial Nerves: PERRL, EOMI, no nystagmus or diplopia. No facial asymmetry.  	Motor: moving all 4 extremities equally w 5/5 strength  	Tone: normal.         	Pronator drift: none                 	Tremor: No resting, postural or action tremor. No myoclonus.  	Sensation: intact to light touch    LABORATORY:  CBC                       10.2   7.29  )-----------( 259      ( 20 Apr 2025 05:32 )             31.6     Chem 04-20    141  |  106  |  18  ----------------------------<  84  3.6   |  21[L]  |  0.96    Ca    9.6      20 Apr 2025 05:29      LFTs   Coagulopathy PT/INR - ( 20 Apr 2025 05:33 )   PT: 12.2 sec;   INR: 1.06 ratio         PTT - ( 20 Apr 2025 05:33 )  PTT:32.8 sec  Lipid Panel   A1c   Cardiac enzymes     U/A Urinalysis Basic - ( 20 Apr 2025 05:29 )    Color: x / Appearance: x / SG: x / pH: x  Gluc: 84 mg/dL / Ketone: x  / Bili: x / Urobili: x   Blood: x / Protein: x / Nitrite: x   Leuk Esterase: x / RBC: x / WBC x   Sq Epi: x / Non Sq Epi: x / Bacteria: x      CSF  Immunological  Other    STUDIES & IMAGING: (EEG, CT, MR, U/S, TTE/BARBIE):  MR Head No Cont (04.15.25 @ 12:39)  Right-sided frontal and temporal deep EEG leads. No hemorrhage or evidence of edema.    VA Duplex Lower Ext Vein Scan, Bilat (04.14.25 @ 19:22) >  No evidence of deep venous thrombosis in either lower extremity.  Statement above in regards to the venous segments able to be diagnostically examined as described.    CT Head No Cont (04.14.25 @ 14:42) >  Interval placement of multiple right sided depth electrodes. Streak artifact from electrodes markedly limits evaluation.  No hydrocephalus, midline shift, or effacement of the basal cisterns.  No gross evidence for the presence of large intracranialhemorrhage or edema.    Start Date: 4/14/2025 – Day 1                  Start Time – 19:02 – 08:00            Duration – 12:29    Artifacts:  electrodes not recording well at: RTi7,RTi11    Non-Epileptiform Findings:   Focal delta at: RDa9-11, RTi9-10, RTP5-7, RTx9-11  Physiologic appearing beta/alpha at:    Interictal abnormalities:  -Frequent spikes, focal, maximum RDa1-3 / RDh1    Ictal abnormalities:   None

## 2025-04-21 ENCOUNTER — TRANSCRIPTION ENCOUNTER (OUTPATIENT)
Age: 55
End: 2025-04-21

## 2025-04-21 ENCOUNTER — APPOINTMENT (OUTPATIENT)
Dept: NEUROSURGERY | Facility: HOSPITAL | Age: 55
End: 2025-04-21

## 2025-04-21 LAB
ANION GAP SERPL CALC-SCNC: 12 MMOL/L — SIGNIFICANT CHANGE UP (ref 5–17)
BUN SERPL-MCNC: 20 MG/DL — SIGNIFICANT CHANGE UP (ref 7–23)
CALCIUM SERPL-MCNC: 9.1 MG/DL — SIGNIFICANT CHANGE UP (ref 8.4–10.5)
CHLORIDE SERPL-SCNC: 105 MMOL/L — SIGNIFICANT CHANGE UP (ref 96–108)
CO2 SERPL-SCNC: 23 MMOL/L — SIGNIFICANT CHANGE UP (ref 22–31)
CREAT SERPL-MCNC: 0.98 MG/DL — SIGNIFICANT CHANGE UP (ref 0.5–1.3)
EGFR: 90 ML/MIN/1.73M2 — SIGNIFICANT CHANGE UP
EGFR: 90 ML/MIN/1.73M2 — SIGNIFICANT CHANGE UP
GLUCOSE SERPL-MCNC: 85 MG/DL — SIGNIFICANT CHANGE UP (ref 70–99)
HCT VFR BLD CALC: 28.9 % — LOW (ref 39–50)
HGB BLD-MCNC: 9.1 G/DL — LOW (ref 13–17)
MAGNESIUM SERPL-MCNC: 1.9 MG/DL — SIGNIFICANT CHANGE UP (ref 1.6–2.6)
MCHC RBC-ENTMCNC: 28.7 PG — SIGNIFICANT CHANGE UP (ref 27–34)
MCHC RBC-ENTMCNC: 31.5 G/DL — LOW (ref 32–36)
MCV RBC AUTO: 91.2 FL — SIGNIFICANT CHANGE UP (ref 80–100)
NRBC BLD AUTO-RTO: 0 /100 WBCS — SIGNIFICANT CHANGE UP (ref 0–0)
PHOSPHATE SERPL-MCNC: 3.9 MG/DL — SIGNIFICANT CHANGE UP (ref 2.5–4.5)
PLATELET # BLD AUTO: 252 K/UL — SIGNIFICANT CHANGE UP (ref 150–400)
POTASSIUM SERPL-MCNC: 3.6 MMOL/L — SIGNIFICANT CHANGE UP (ref 3.5–5.3)
POTASSIUM SERPL-SCNC: 3.6 MMOL/L — SIGNIFICANT CHANGE UP (ref 3.5–5.3)
RBC # BLD: 3.17 M/UL — LOW (ref 4.2–5.8)
RBC # FLD: 21 % — HIGH (ref 10.3–14.5)
SODIUM SERPL-SCNC: 140 MMOL/L — SIGNIFICANT CHANGE UP (ref 135–145)
WBC # BLD: 7.08 K/UL — SIGNIFICANT CHANGE UP (ref 3.8–10.5)
WBC # FLD AUTO: 7.08 K/UL — SIGNIFICANT CHANGE UP (ref 3.8–10.5)

## 2025-04-21 PROCEDURE — 99233 SBSQ HOSP IP/OBS HIGH 50: CPT | Mod: FS

## 2025-04-21 PROCEDURE — 70450 CT HEAD/BRAIN W/O DYE: CPT | Mod: 26

## 2025-04-21 PROCEDURE — 95962 ELECTRODE STIM BRAIN ADD-ON: CPT | Mod: 26

## 2025-04-21 PROCEDURE — 95961 ELECTRODE STIMULATION BRAIN: CPT | Mod: 26

## 2025-04-21 PROCEDURE — 64999 UNLISTED PX NERVOUS SYSTEM: CPT | Mod: 78

## 2025-04-21 PROCEDURE — 95718 EEG PHYS/QHP 2-12 HR W/VEEG: CPT

## 2025-04-21 PROCEDURE — 70450 CT HEAD/BRAIN W/O DYE: CPT | Mod: 26,77

## 2025-04-21 RX ORDER — ONDANSETRON HCL/PF 4 MG/2 ML
4 VIAL (ML) INJECTION ONCE
Refills: 0 | Status: DISCONTINUED | OUTPATIENT
Start: 2025-04-21 | End: 2025-04-21

## 2025-04-21 RX ORDER — ONDANSETRON HCL/PF 4 MG/2 ML
4 VIAL (ML) INJECTION EVERY 6 HOURS
Refills: 0 | Status: DISCONTINUED | OUTPATIENT
Start: 2025-04-21 | End: 2025-04-22

## 2025-04-21 RX ORDER — SENNA 187 MG
2 TABLET ORAL AT BEDTIME
Refills: 0 | Status: DISCONTINUED | OUTPATIENT
Start: 2025-04-21 | End: 2025-04-22

## 2025-04-21 RX ORDER — ACETAMINOPHEN 500 MG/5ML
1000 LIQUID (ML) ORAL ONCE
Refills: 0 | Status: COMPLETED | OUTPATIENT
Start: 2025-04-21 | End: 2025-04-21

## 2025-04-21 RX ORDER — LACOSAMIDE 150 MG/1
100 TABLET, FILM COATED ORAL ONCE
Refills: 0 | Status: DISCONTINUED | OUTPATIENT
Start: 2025-04-21 | End: 2025-04-22

## 2025-04-21 RX ORDER — LACOSAMIDE 150 MG/1
200 TABLET, FILM COATED ORAL
Refills: 0 | Status: DISCONTINUED | OUTPATIENT
Start: 2025-04-21 | End: 2025-04-22

## 2025-04-21 RX ORDER — METHOCARBAMOL 500 MG/1
750 TABLET, FILM COATED ORAL EVERY 8 HOURS
Refills: 0 | Status: DISCONTINUED | OUTPATIENT
Start: 2025-04-21 | End: 2025-04-22

## 2025-04-21 RX ORDER — BISACODYL 5 MG
5 TABLET, DELAYED RELEASE (ENTERIC COATED) ORAL DAILY
Refills: 0 | Status: DISCONTINUED | OUTPATIENT
Start: 2025-04-21 | End: 2025-04-22

## 2025-04-21 RX ORDER — BRIVARACETAM 75 MG/1
100 TABLET, FILM COATED ORAL EVERY 12 HOURS
Refills: 0 | Status: DISCONTINUED | OUTPATIENT
Start: 2025-04-21 | End: 2025-04-22

## 2025-04-21 RX ORDER — MELATONIN 5 MG
3 TABLET ORAL AT BEDTIME
Refills: 0 | Status: DISCONTINUED | OUTPATIENT
Start: 2025-04-21 | End: 2025-04-22

## 2025-04-21 RX ORDER — ACETAMINOPHEN 500 MG/5ML
1000 LIQUID (ML) ORAL EVERY 6 HOURS
Refills: 0 | Status: COMPLETED | OUTPATIENT
Start: 2025-04-21 | End: 2025-04-22

## 2025-04-21 RX ORDER — HYDROMORPHONE/SOD CHLOR,ISO/PF 2 MG/10 ML
2 SYRINGE (ML) INJECTION EVERY 4 HOURS
Refills: 0 | Status: DISCONTINUED | OUTPATIENT
Start: 2025-04-21 | End: 2025-04-22

## 2025-04-21 RX ORDER — HYDROMORPHONE/SOD CHLOR,ISO/PF 2 MG/10 ML
0.5 SYRINGE (ML) INJECTION ONCE
Refills: 0 | Status: DISCONTINUED | OUTPATIENT
Start: 2025-04-21 | End: 2025-04-21

## 2025-04-21 RX ORDER — AMLODIPINE BESYLATE 10 MG/1
10 TABLET ORAL DAILY
Refills: 0 | Status: DISCONTINUED | OUTPATIENT
Start: 2025-04-21 | End: 2025-04-22

## 2025-04-21 RX ORDER — FENTANYL CITRATE-0.9 % NACL/PF 100MCG/2ML
25 SYRINGE (ML) INTRAVENOUS
Refills: 0 | Status: DISCONTINUED | OUTPATIENT
Start: 2025-04-21 | End: 2025-04-21

## 2025-04-21 RX ORDER — LORAZEPAM 4 MG/ML
1 VIAL (ML) INJECTION ONCE
Refills: 0 | Status: DISCONTINUED | OUTPATIENT
Start: 2025-04-21 | End: 2025-04-22

## 2025-04-21 RX ORDER — ACETAMINOPHEN 500 MG/5ML
650 LIQUID (ML) ORAL EVERY 6 HOURS
Refills: 0 | Status: DISCONTINUED | OUTPATIENT
Start: 2025-04-21 | End: 2025-04-21

## 2025-04-21 RX ORDER — OXYCODONE HYDROCHLORIDE 30 MG/1
5 TABLET ORAL ONCE
Refills: 0 | Status: DISCONTINUED | OUTPATIENT
Start: 2025-04-21 | End: 2025-04-21

## 2025-04-21 RX ORDER — ATORVASTATIN CALCIUM 80 MG/1
20 TABLET, FILM COATED ORAL AT BEDTIME
Refills: 0 | Status: DISCONTINUED | OUTPATIENT
Start: 2025-04-21 | End: 2025-04-22

## 2025-04-21 RX ORDER — HYDROMORPHONE/SOD CHLOR,ISO/PF 2 MG/10 ML
4 SYRINGE (ML) INJECTION EVERY 4 HOURS
Refills: 0 | Status: DISCONTINUED | OUTPATIENT
Start: 2025-04-21 | End: 2025-04-22

## 2025-04-21 RX ADMIN — METHOCARBAMOL 750 MILLIGRAM(S): 500 TABLET, FILM COATED ORAL at 21:01

## 2025-04-21 RX ADMIN — Medication 75 MILLILITER(S): at 15:10

## 2025-04-21 RX ADMIN — Medication 1000 MILLIGRAM(S): at 15:48

## 2025-04-21 RX ADMIN — Medication 4 MILLIGRAM(S): at 01:15

## 2025-04-21 RX ADMIN — Medication 1000 MILLIGRAM(S): at 22:30

## 2025-04-21 RX ADMIN — Medication 0.5 MILLIGRAM(S): at 13:03

## 2025-04-21 RX ADMIN — LACOSAMIDE 140 MILLIGRAM(S): 150 TABLET, FILM COATED ORAL at 11:19

## 2025-04-21 RX ADMIN — Medication 1000 MILLIGRAM(S): at 11:28

## 2025-04-21 RX ADMIN — ATORVASTATIN CALCIUM 20 MILLIGRAM(S): 80 TABLET, FILM COATED ORAL at 21:02

## 2025-04-21 RX ADMIN — LACOSAMIDE 140 MILLIGRAM(S): 150 TABLET, FILM COATED ORAL at 05:04

## 2025-04-21 RX ADMIN — Medication 2 MILLIGRAM(S): at 04:00

## 2025-04-21 RX ADMIN — Medication 400 MILLIGRAM(S): at 15:45

## 2025-04-21 RX ADMIN — Medication 400 MILLIGRAM(S): at 10:47

## 2025-04-21 RX ADMIN — Medication 75 MILLILITER(S): at 00:18

## 2025-04-21 RX ADMIN — Medication 4 MILLIGRAM(S): at 20:30

## 2025-04-21 RX ADMIN — Medication 2 MILLIGRAM(S): at 02:58

## 2025-04-21 RX ADMIN — Medication 4 MILLIGRAM(S): at 20:05

## 2025-04-21 RX ADMIN — LACOSAMIDE 140 MILLIGRAM(S): 150 TABLET, FILM COATED ORAL at 19:31

## 2025-04-21 RX ADMIN — Medication 0.5 MILLIGRAM(S): at 13:30

## 2025-04-21 RX ADMIN — BRIVARACETAM 240 MILLIGRAM(S): 75 TABLET, FILM COATED ORAL at 17:05

## 2025-04-21 RX ADMIN — AMLODIPINE BESYLATE 10 MILLIGRAM(S): 10 TABLET ORAL at 05:04

## 2025-04-21 RX ADMIN — Medication 400 MILLIGRAM(S): at 21:45

## 2025-04-21 RX ADMIN — Medication 4 MILLIGRAM(S): at 00:17

## 2025-04-21 RX ADMIN — Medication 2 TABLET(S): at 21:02

## 2025-04-21 RX ADMIN — METHOCARBAMOL 750 MILLIGRAM(S): 500 TABLET, FILM COATED ORAL at 05:04

## 2025-04-21 RX ADMIN — Medication 4 MILLIGRAM(S): at 07:57

## 2025-04-21 RX ADMIN — BRIVARACETAM 240 MILLIGRAM(S): 75 TABLET, FILM COATED ORAL at 05:04

## 2025-04-21 RX ADMIN — Medication 4 MILLIGRAM(S): at 16:00

## 2025-04-21 RX ADMIN — Medication 4 MILLIGRAM(S): at 08:45

## 2025-04-21 RX ADMIN — METHOCARBAMOL 750 MILLIGRAM(S): 500 TABLET, FILM COATED ORAL at 13:03

## 2025-04-21 RX ADMIN — Medication 4 MILLIGRAM(S): at 16:30

## 2025-04-21 NOTE — PROGRESS NOTE ADULT - ASSESSMENT
55M, PMH for Type A aortic dissection s/p AV resuspension, ascending aorta and hemiarch replacement, CAD, CABG x 1 (5/2013), s/p Aortic Valve Replacement, s/p TEVAR (05/2023), PE (s/p IVC filter, subsequent removal by Dr. Ellison 9/13/23, not on AC), necrotizing pancreatitis s/p exp lap 5/31/23, Symptomatic Anemia, seizure disorder (2018-present), reports he has been experiencing multiple seizure episodes per month, each occurrence lasting a few seconds, described as unresponsive, staring, drooling. not responding to medication. Admitted for scheduled Stage 1 Right Stereo EEG for Implantation of Electrodes with Dr. Rowe on 04/14/2025.  EEG data this hospitalization  Non-Epileptiform Findings:   Focal delta at: RDa9-11, RTi9-10, RTP5-7, RTx9-11  Physiologic appearing beta/alpha at:    Interictal abnormalities:  -Frequent spikes, focal, maximum RDa1-3 / RDh1    PLAN:  - Started on brivaracetam 100 mg BID on 4/18  - Started on lacosamide 200 mg TID on 4/18  - Rescues: first-line lorazepam 1mg IV for GTC lasting >5min, second-line lacosamide 100mg IV  - NPO at midnight for sEEG lead explantation on 4/21  -planning for surgical intervention to be discussed this week epilepsy MDC  - Rest of management per Neurosurgery (primary team)    Patient seen and d/w Dr. Isaac

## 2025-04-21 NOTE — PROGRESS NOTE ADULT - SUBJECTIVE AND OBJECTIVE BOX
DATE OF SERVICE: 04-21-25 @ 13:37    Patient is a 55y old  Male who presents with a chief complaint of Patient was admitted for SEEG placement for seizure ficus mapping. (20 Apr 2025 07:49)      INTERVAL HISTORY: no acute events noted    REVIEW OF SYSTEMS:  CONSTITUTIONAL: No weakness  EYES/ENT: No visual changes;  No throat pain   NECK: No pain or stiffness  RESPIRATORY: No cough, wheezing; No shortness of breath  CARDIOVASCULAR: No chest pain or palpitations  GASTROINTESTINAL: No abdominal  pain. No nausea, vomiting, or hematemesis  GENITOURINARY: No dysuria, frequency or hematuria  NEUROLOGICAL: No stroke like symptoms  SKIN: No rashes    	  MEDICATIONS:  amLODIPine   Tablet 10 milliGRAM(s) Oral daily  hydrochlorothiazide 12.5 milliGRAM(s) Oral daily        PHYSICAL EXAM:  T(C): 36.8 (04-21-25 @ 07:20), Max: 37.1 (04-20-25 @ 16:04)  HR: 68 (04-21-25 @ 07:20) (57 - 68)  BP: 129/75 (04-21-25 @ 07:20) (123/65 - 146/82)  RR: 18 (04-21-25 @ 07:20) (18 - 18)  SpO2: 100% (04-21-25 @ 07:20) (98% - 100%)  Wt(kg): --  I&O's Summary    20 Apr 2025 07:01  -  21 Apr 2025 07:00  --------------------------------------------------------  IN: 320 mL / OUT: 1545 mL / NET: -1225 mL    21 Apr 2025 07:01  -  21 Apr 2025 13:37  --------------------------------------------------------  IN: 0 mL / OUT: 400 mL / NET: -400 mL          Appearance: In no distress	  HEENT:    PERRL, EOMI	  Cardiovascular:  S1 S2, No JVD  Respiratory: Lungs clear to auscultation	  Gastrointestinal:  Soft, Non-tender, + BS	  Vascularature:  No edema of LE  Psychiatric: Appropriate affect   Neuro: no acute focal deficits                               9.1    7.08  )-----------( 252      ( 21 Apr 2025 06:42 )             28.9     04-21    140  |  105  |  20  ----------------------------<  85  3.6   |  23  |  0.98    Ca    9.1      21 Apr 2025 06:38  Phos  3.9     04-21  Mg     1.9     04-21          Labs personally reviewed      ASSESSMENT/PLAN: 	    54 y/o M with PMHx significant for Type A aortic dissection s/p AV resuspension, ascending aorta and hemiarch replacement, CAD, CABG x 1 (5/2013), s/p Aortic Valve Replacement, s/p TEVAR (05/2023), PE (s/p IVC filter, subsequent removal by Dr. Ellison 9/13/23, not on AC), necrotizing pancreatitis s/p exp lap 5/31/23, Symptomatic Anemia s/p multiple Blood Transfusions, 1 unit transfused last week, Seizure disorder (2018-present), reports he has been experiencing multiple seizure episodes per month, each occurrence lasting a few seconds, described as unresponsive, staring, drooling. not responding to medication. Patient is now scheduled for Stage 1 Right Stereo EEG for Implantation of Electrodes with Dr. Rowe on 04/14/2025.    Problem 1: Cardiac Risk Stratification   - Pt with some fatigue on exertion  - No evidence of tachy-bailey arrythmia  - Reviewed TTE from 10/2024 with normal biventricular function, repeat 4/20 unchanged biV function w/ LVOT obstruction  - EKG 4/18 - NSR 71 bpm  - Elevated risk for mod risk surgery. No cardiac contraindication to proceed  - Avoid afterload reducing agents if possible given LVOT obstruction     Problem 2: CAD  - Hx CABG in 2023  - C/w ASA & Atorvastatin 20mg     Problem 3: Hx Aortic Dissection & Valve replacement  - TEVAR 3/3023  - TTE 10/2024 = LVEF 65%, mod concentric LVH, normal RV size and systolic function. severely dilated LA. Bioprosthetic valve in aortic position. no evidence of AR. Probable moderate MR. Moderate TR. pHTN, PASP 54  - TTE 4/20/25 preserved EF, chordal SHAJI w/ severe LVOT obstruction, dyskinetic apex    - Avoid afterload reducing agents     Problem 4: HTN  - Well controlled  - C/w Amlodipine 10mg  - C/w HCTZ 12.5    Problem 5: Epilepsy  - Neurology and Neurosurgery following, appreciate recs  - s/p Insertion of Rt SEEG electrode lead 4/14, pending explant electrodes    Problem 6: DVT prophylaxis  - Lovenox         Zahraaani Behnoe, AG-NP   Ivan Morales DO [facility]  Cardiovascular Medicine  [address] [place] [address], [address]  Available through call or text on [employer]  Office: [phone]   DATE OF SERVICE: 04-21-25 @ 13:37    Patient is a 55y old  Male who presents with a chief complaint of Patient was admitted for SEEG placement for seizure ficus mapping. (20 Apr 2025 07:49)      INTERVAL HISTORY: no acute events noted    REVIEW OF SYSTEMS:  CONSTITUTIONAL: No weakness  EYES/ENT: No visual changes;  No throat pain   NECK: No pain or stiffness  RESPIRATORY: No cough, wheezing; No shortness of breath  CARDIOVASCULAR: No chest pain or palpitations  GASTROINTESTINAL: No abdominal  pain. No nausea, vomiting, or hematemesis  GENITOURINARY: No dysuria, frequency or hematuria  NEUROLOGICAL: No stroke like symptoms  SKIN: No rashes    	  MEDICATIONS:  amLODIPine   Tablet 10 milliGRAM(s) Oral daily  hydrochlorothiazide 12.5 milliGRAM(s) Oral daily        PHYSICAL EXAM:  T(C): 36.8 (04-21-25 @ 07:20), Max: 37.1 (04-20-25 @ 16:04)  HR: 68 (04-21-25 @ 07:20) (57 - 68)  BP: 129/75 (04-21-25 @ 07:20) (123/65 - 146/82)  RR: 18 (04-21-25 @ 07:20) (18 - 18)  SpO2: 100% (04-21-25 @ 07:20) (98% - 100%)  Wt(kg): --  I&O's Summary    20 Apr 2025 07:01  -  21 Apr 2025 07:00  --------------------------------------------------------  IN: 320 mL / OUT: 1545 mL / NET: -1225 mL    21 Apr 2025 07:01  -  21 Apr 2025 13:37  --------------------------------------------------------  IN: 0 mL / OUT: 400 mL / NET: -400 mL          Appearance: In no distress	  HEENT:    PERRL, EOMI	  Cardiovascular:  S1 S2, No JVD  Respiratory: Lungs clear to auscultation	  Gastrointestinal:  Soft, Non-tender, + BS	  Vascularature:  No edema of LE  Psychiatric: Appropriate affect   Neuro: no acute focal deficits                               9.1    7.08  )-----------( 252      ( 21 Apr 2025 06:42 )             28.9     04-21    140  |  105  |  20  ----------------------------<  85  3.6   |  23  |  0.98    Ca    9.1      21 Apr 2025 06:38  Phos  3.9     04-21  Mg     1.9     04-21          Labs personally reviewed      ASSESSMENT/PLAN: 	    56 y/o M with PMHx significant for Type A aortic dissection s/p AV resuspension, ascending aorta and hemiarch replacement, CAD, CABG x 1 (5/2013), s/p Aortic Valve Replacement, s/p TEVAR (05/2023), PE (s/p IVC filter, subsequent removal by Dr. Ellison 9/13/23, not on AC), necrotizing pancreatitis s/p exp lap 5/31/23, Symptomatic Anemia s/p multiple Blood Transfusions, 1 unit transfused last week, Seizure disorder (2018-present), reports he has been experiencing multiple seizure episodes per month, each occurrence lasting a few seconds, described as unresponsive, staring, drooling. not responding to medication. Patient is now scheduled for Stage 1 Right Stereo EEG for Implantation of Electrodes with Dr. Rowe on 04/14/2025.    Problem 1: Cardiac Risk Stratification   - Pt with some fatigue on exertion  - No evidence of tachy-bailey arrythmia  - Reviewed TTE from 10/2024 with normal biventricular function, repeat 4/20 unchanged biV function w/ LVOT obstruction  - EKG 4/18 - NSR 71 bpm  - Elevated risk for mod risk surgery. No cardiac contraindication to proceed  - Avoid afterload reducing agents if possible given LVOT obstruction     Problem 2: CAD  - Hx CABG in 2023  - C/w ASA & Atorvastatin 20mg     Problem 3: Hx Aortic Dissection & Valve replacement  - TEVAR 3/3023  - TTE 10/2024 = LVEF 65%, mod concentric LVH, normal RV size and systolic function. severely dilated LA. Bioprosthetic valve in aortic position. no evidence of AR. Probable moderate MR. Moderate TR. pHTN, PASP 54  - TTE 4/20/25 preserved EF, chordal SHAJI w/ severe LVOT obstruction, dyskinetic apex    - Avoid afterload reducing agents     Problem 4: HTN  - Well controlled  - C/w Amlodipine 10mg  - C/w HCTZ 12.5    Problem 5: Epilepsy  - Neurology and Neurosurgery following, appreciate recs  - s/p Insertion of Rt SEEG electrode lead 4/14, pending explant electrodes    Problem 6: DVT prophylaxis  - Lovenox

## 2025-04-21 NOTE — BRIEF OPERATIVE NOTE - OPERATION/FINDINGS
11 Depth electrodes and 1 reference strip (pericranial) Right sided electrodes
11 electrodes and reference electrode explanted

## 2025-04-21 NOTE — PRE-ANESTHESIA EVALUATION ADULT - NSANTHPEFT_GEN_ALL_CORE
General: well appearing, appears stated age  Cardiovascular: RRR  Respiratory: regular chest excursion

## 2025-04-21 NOTE — PROGRESS NOTE ADULT - ASSESSMENT
55M extensive cardiac hx (aortic dissection s/p aorta and hemiarch replacement, CABG 2013, AV replacement, TEVAR 2023), PE/b/l DVTs not on AC, necrotizing pancreatitis s/p exp lap 2023, Anemia s/p multiple Blood Transfusions (last 4/2025), epilepsy since 2018. Sx last seconds, episodes of unresponsive, staring, drooling (last seizure 2/2025) . not responding to medication. Now s/p Stage 1 Right sEEG and Stage 2 explant of electrodes    - S/p explant of 11 sEEG electrodes and reference electrode.   - PACU --> CTH --> Floor

## 2025-04-21 NOTE — BRIEF OPERATIVE NOTE - NSICDXBRIEFPROCEDURE_GEN_ALL_CORE_FT
PROCEDURES:  Removal of SEEG electrode leads 21-Apr-2025 14:52:43  Monica Lebron  
PROCEDURES:  Insertion of SEEG electrode lead 14-Apr-2025 14:16:36  Ritu Donovan

## 2025-04-21 NOTE — PROGRESS NOTE ADULT - SUBJECTIVE AND OBJECTIVE BOX
Neurology Progress Note    SUBJECTIVE/OBJECTIVE/INTERVAL EVENTS: Patient seen and examined at bedside w/ neuro attending and team.     INTERVAL HISTORY:  NAEON noted in chart. EEG improved with medications added back on.    4/21: patient undergoing data recording w research team. Patient feels well and without complaints. Plan for explant today.      PAST MEDICAL & SURGICAL HISTORY:  HTN (hypertension)      Aortic dissection      CAD (coronary artery disease)      Seizure disorder      Hypertension      Status post endovascular aneurysm repair (EVAR)      Pancreatitis  hospitalized for 5 months per spouse      DVT, lower extremity      Pulmonary embolism      Anemia      Marijuana use      S/P aortic bifurcation bypass graft      S/P CABG x 1      Elective surgery  IVC filter      H/O exploratory laparotomy      H/O aortic valve repair      H/O aortic aneurysm repair        FAMILY HISTORY:      MEDICATIONS (HOME):  Home Medications:  amLODIPine 10 mg oral tablet: 1 tab(s) orally once a day (14 Apr 2025 09:27)  hydroCHLOROthiazide 12.5 mg oral capsule: 1 cap(s) orally once a day (14 Apr 2025 09:27)    MEDICATIONS  (STANDING):  amLODIPine   Tablet 10 milliGRAM(s) Oral daily  atorvastatin 20 milliGRAM(s) Oral at bedtime  brivaracetam  IVPB 100 milliGRAM(s) IV Intermittent every 12 hours  hydrochlorothiazide 12.5 milliGRAM(s) Oral daily  lacosamide IVPB 200 milliGRAM(s) IV Intermittent <User Schedule>  methocarbamol 750 milliGRAM(s) Oral every 8 hours  senna 2 Tablet(s) Oral at bedtime  sodium chloride 0.9%. 1000 milliLiter(s) (75 mL/Hr) IV Continuous <Continuous>    MEDICATIONS  (PRN):  acetaminophen     Tablet .. 650 milliGRAM(s) Oral every 6 hours PRN Mild Pain (1 - 3)  bisacodyl 5 milliGRAM(s) Oral daily PRN Constipation  HYDROmorphone   Tablet 2 milliGRAM(s) Oral every 4 hours PRN Moderate Pain (4 - 6)  HYDROmorphone   Tablet 4 milliGRAM(s) Oral every 4 hours PRN Severe Pain (7 - 10)  lacosamide Injectable 100 milliGRAM(s) IV Push once PRN 2nd line therapy, GTC lasting >5 mins refractory to ativan  LORazepam   Injectable 1 milliGRAM(s) IV Push once PRN GTC lasting >5 mins with vital sign changes  melatonin 3 milliGRAM(s) Oral at bedtime PRN Insomnia  ondansetron Injectable 4 milliGRAM(s) IV Push every 6 hours PRN Nausea and/or Vomiting    ALLERGIES/INTOLERANCES:  Allergies  No Known Allergies    Intolerances    VITALS & EXAMINATION:  Vital Signs Last 24 Hrs  T(C): 36.8 (21 Apr 2025 07:20), Max: 37.1 (20 Apr 2025 16:04)  T(F): 98.3 (21 Apr 2025 07:20), Max: 98.8 (20 Apr 2025 16:04)  HR: 68 (21 Apr 2025 07:20) (57 - 68)  BP: 129/75 (21 Apr 2025 07:20) (123/65 - 146/82)  BP(mean): --  RR: 18 (21 Apr 2025 07:20) (18 - 18)  SpO2: 100% (21 Apr 2025 07:20) (98% - 100%)    Parameters below as of 21 Apr 2025 07:20  Patient On (Oxygen Delivery Method): room air      PHYSICAL EXAM:  mental status: awake, alert, oriented  CN: EOMI, face symmetric, tongue midline  motor: spontaneous movement thru out, follows commands      LABORATORY:  CBC                       9.1    7.08  )-----------( 252      ( 21 Apr 2025 06:42 )             28.9     Chem 04-21    140  |  105  |  20  ----------------------------<  85  3.6   |  23  |  0.98    Ca    9.1      21 Apr 2025 06:38  Phos  3.9     04-21  Mg     1.9     04-21      LFTs   Coagulopathy PT/INR - ( 20 Apr 2025 05:33 )   PT: 12.2 sec;   INR: 1.06 ratio         PTT - ( 20 Apr 2025 05:33 )  PTT:32.8 sec  Lipid Panel   A1c   Cardiac enzymes     U/A Urinalysis Basic - ( 21 Apr 2025 06:38 )    Color: x / Appearance: x / SG: x / pH: x  Gluc: 85 mg/dL / Ketone: x  / Bili: x / Urobili: x   Blood: x / Protein: x / Nitrite: x   Leuk Esterase: x / RBC: x / WBC x   Sq Epi: x / Non Sq Epi: x / Bacteria: x        STUDIES & IMAGING:  Studies (EKG, EEG, EMG, etc):     Radiology (XR, CT, MR, U/S, TTE/BARBIE):

## 2025-04-21 NOTE — PROGRESS NOTE ADULT - SUBJECTIVE AND OBJECTIVE BOX
55M extensive cardiac hx (aortic dissection s/p aorta and hemiarch replacement, CABG 2013, AV replacement, TEVAR 2023), PE/b/l DVTs not on AC, necrotizing pancreatitis s/p exp lap 2023, Anemia s/p multiple Blood Transfusions (last 4/2025), epilepsy since 2018. Sx last seconds, episodes of unresponsive, staring, drooling (last seizure 2/2025) . not responding to medication. Now s/p Stage 1 Right sEEG and Stage 2 explant of electrodes    Exam: Intact

## 2025-04-21 NOTE — EEG REPORT - NS EEG TEXT BOX
[facility]   Comprehensive Epilepsy Center  Report of Electrocorticography and Electrical Stimulation Mapping    [facility]: [address], [place] [address]				Telephone: [phone]  [facility]: [address] [facility], [address] [place] [address]		Telephone: [phone]    Patient Name	Sex	Age		EEG #	MR Number  Ovidio Villalobos 	Male	55 year	1970	-MR#  [id number]  Handedness	Referring Physician	Patient ID	Bah	Study Date  Unknown	Rowe	-	4 [facility]  	2025    Duration	 Continuous ECOG with Cortical Mapping performed for a total of approximately 2 hours. MD was present during the entire time    Study Information:  Headbox: Quantum    Recording Technique: The patient underwent continuous Video-EEG monitoring, using Telemetry System hardware on the XLTek Digital System.  EEG and video data were stored on a computer hard drive with important events saved in digital archive files. The material was reviewed by a physician (electroencephalographer / epileptologist) during stimulation to detect stimulation induced after discharges.     Cortical Mapping Component: Electrical stimulation of the cerebral cortex was conducted using a Grass Stimulator, which delivers a current controlled output. The stimulus parameters were set to a pulse width of 200µs (unless otherwise noted), pulse frequency of 20-50 Hz, and maximum pulse train duration of 2-4 seconds. The stimulus current was manually controlled during the stimulation and ranged from 1 mA to 6 mA (for depth electrodes) and up to 12mA for subdural grid and strip electrodes. Electrical stimulation was usually started at a low current of 1 mA and raised in increments of 1-2 mA until a the mentioned maximum, a functional response, or after-discharges were elicited. The EEG was read by a physician electroencephalographer during stimulation. Stimulation was stopped when after discharges were elicited.         History:  h/o refractory epilepsy.    Electrical stimulation mapping was performed using the depth electrodes to localize eloquent brain areas to be spared from resection, to confirm language lateralization, and to localize areas of cortical hyperexcitability.     Medication  See daily EEG report.    Interpretation:    2025      EEG Findings:  Mixed frequency cerebral activity was observed, with admixed beta, low amplitude delta, and irregular theta. There was abundant interictal spiking in the right mesial temporal structures. For detailed report of interictal and ictal activity please refer to the EEG report.    Language Testing: At each stimulus site, the patient was tested on up to five different language tasks if relevant for location of the electrode.  The patient was asked to recite The Pledge of Allegiance or to count from 1-20 to evaluate for speech arrest (fluency).  The patient was asked to name pictures during stimulation (pictoral naming).  The patient was asked to name the object of description during stimulation (auditory naming).  The patient was tested for comprehension by following a three step command and/or following directions to point at various shapes and colors on a cue card (comprehension).  Reading was then tested for by presenting words and/or sentences with the last word missing.  The amperage of the applied current was limited to the maximum determined by after-discharges as discussed above.      Aura/seizure description	HA with nausea  Contacts (montage labeling)	Max Amplitude (mA)	Tests	Subjective	Objective	After Discharges (AD)	Region/Fxn/Notes	Electrode Notes  RIa1-2	5	Finger tapping, Reciting speech (Pledge of allegience)		clear			  RIa3-4	5	Reciting speech		clear			  RIa5-6	5	Looking at SB		clear			  RIa7-8	5						  RIa9-10	5	Finger tapping		clear			  YNw14-17	5	Finger tapping		clear			  QQy72-17	5						  PDv39-97	5						  RIm1-2	5						  RIm3-4	5						  RIm5-6	5						  RIm7-8	5	Finger tapping		clear			  RIm9-10	5	Finger tapping		clear			  RCf30-74	5	Finger tapping		clear			  RBy60-74	5						  LGr64-60	5	Finger tapping		clear			  RIp1-2	5						  RIp3-4	5						  RIp5-6	5						  RIp7-8	5						  RIp9-10	5	Finger tapping		clear			  GMo83-39	5						  VTv68-37	5	Finger tapping		clear			  NOg08-44	5						  RTs1-3	4						  RTs3-5	4						  RTs5-7	4						  RTs7-9	4						  RTs9-11	4	SB counting upwards		clear			  QTp65-11	4						  RTs13-15	4						  HBh06-66	5	Picture naming		clear	Medial RDa AD apparently unrelated to stim		  RDa8-9	5	Picture naming		clear			  RDa6-7	4.5	Picture naming		clear			  RDh9-10	5	Picture naming, auditory naming		clear			  RDh7-8	5	Picture naming, auditory naming		clear			  RDh5-6	5						  RDp9-10	5	Picture naming, auditory naming		clear			  RDp7-8	5	Picture naming, auditory naming		clear			  RDp5-6	5						  RDp3-4	5						  RTi1-2	5						  RTi3-4	5	Picture naming		clear			  RTi5-6	5	Picture naming		clear			  RTi7-8	5	Picture naming, auditory naming		clear			  RTi9-10	5	Picture naming, auditory naming		clear			  RTo1-2	3	Picture naming		clear	AD at 4mA in RTo4-7		  RTo3-4	4						  RTo5-6	5	Looking at SB		clear			  RTo7-8	5	Picture naming		clear			  RTo9-10	5						  RTp1-2	5	Looking at SB, Picture naming		clear			  RTp3-4	5	Auditory naming		clear			  RTp5-6	5	Auditory naming		clear			  RTp7-8	5	Auditory naming		clear	AD at 5mA in RTp9		             Summary:   There were no language, motor, or sensory hits.     - Stimulation at several sites induces afterdischarges no seizures were elicited (see table).     For other findings, including negative sites, please see table above.    The patient tolerated the procedure well throughout.          Faizan Martinez MD PhD   of Neurology  [facility] [place]

## 2025-04-22 ENCOUNTER — TRANSCRIPTION ENCOUNTER (OUTPATIENT)
Age: 55
End: 2025-04-22

## 2025-04-22 VITALS
SYSTOLIC BLOOD PRESSURE: 128 MMHG | OXYGEN SATURATION: 100 % | RESPIRATION RATE: 18 BRPM | DIASTOLIC BLOOD PRESSURE: 76 MMHG | HEART RATE: 74 BPM | TEMPERATURE: 99 F

## 2025-04-22 PROCEDURE — 93005 ELECTROCARDIOGRAM TRACING: CPT

## 2025-04-22 PROCEDURE — 86901 BLOOD TYPING SEROLOGIC RH(D): CPT

## 2025-04-22 PROCEDURE — 36415 COLL VENOUS BLD VENIPUNCTURE: CPT

## 2025-04-22 PROCEDURE — 83735 ASSAY OF MAGNESIUM: CPT

## 2025-04-22 PROCEDURE — C9254: CPT

## 2025-04-22 PROCEDURE — G0463: CPT

## 2025-04-22 PROCEDURE — 95716 VEEG EA 12-26HR CONT MNTR: CPT

## 2025-04-22 PROCEDURE — 85027 COMPLETE CBC AUTOMATED: CPT

## 2025-04-22 PROCEDURE — 99231 SBSQ HOSP IP/OBS SF/LOW 25: CPT | Mod: GC

## 2025-04-22 PROCEDURE — 85610 PROTHROMBIN TIME: CPT

## 2025-04-22 PROCEDURE — 93970 EXTREMITY STUDY: CPT

## 2025-04-22 PROCEDURE — 70551 MRI BRAIN STEM W/O DYE: CPT | Mod: MC

## 2025-04-22 PROCEDURE — 80053 COMPREHEN METABOLIC PANEL: CPT

## 2025-04-22 PROCEDURE — C8929: CPT

## 2025-04-22 PROCEDURE — 70450 CT HEAD/BRAIN W/O DYE: CPT | Mod: MC

## 2025-04-22 PROCEDURE — 95700 EEG CONT REC W/VID EEG TECH: CPT

## 2025-04-22 PROCEDURE — 86900 BLOOD TYPING SEROLOGIC ABO: CPT

## 2025-04-22 PROCEDURE — 99232 SBSQ HOSP IP/OBS MODERATE 35: CPT

## 2025-04-22 PROCEDURE — 85025 COMPLETE CBC W/AUTO DIFF WBC: CPT

## 2025-04-22 PROCEDURE — 99233 SBSQ HOSP IP/OBS HIGH 50: CPT

## 2025-04-22 PROCEDURE — 80048 BASIC METABOLIC PNL TOTAL CA: CPT

## 2025-04-22 PROCEDURE — 87640 STAPH A DNA AMP PROBE: CPT

## 2025-04-22 PROCEDURE — 97161 PT EVAL LOW COMPLEX 20 MIN: CPT

## 2025-04-22 PROCEDURE — 95713 VEEG 2-12 HR CONT MNTR: CPT

## 2025-04-22 PROCEDURE — C1889: CPT

## 2025-04-22 PROCEDURE — 86850 RBC ANTIBODY SCREEN: CPT

## 2025-04-22 PROCEDURE — 97165 OT EVAL LOW COMPLEX 30 MIN: CPT

## 2025-04-22 PROCEDURE — 85730 THROMBOPLASTIN TIME PARTIAL: CPT

## 2025-04-22 PROCEDURE — 82962 GLUCOSE BLOOD TEST: CPT

## 2025-04-22 PROCEDURE — 87641 MR-STAPH DNA AMP PROBE: CPT

## 2025-04-22 PROCEDURE — 84100 ASSAY OF PHOSPHORUS: CPT

## 2025-04-22 RX ORDER — ACETAMINOPHEN 500 MG/5ML
2 LIQUID (ML) ORAL
Qty: 0 | Refills: 0 | DISCHARGE

## 2025-04-22 RX ORDER — BRIVARACETAM 75 MG/1
1 TABLET, FILM COATED ORAL
Qty: 60 | Refills: 3
Start: 2025-04-22 | End: 2025-08-19

## 2025-04-22 RX ORDER — METHOCARBAMOL 500 MG/1
1 TABLET, FILM COATED ORAL
Qty: 15 | Refills: 0
Start: 2025-04-22 | End: 2025-04-26

## 2025-04-22 RX ORDER — SENNA 187 MG
2 TABLET ORAL
Qty: 0 | Refills: 0 | DISCHARGE
Start: 2025-04-22

## 2025-04-22 RX ORDER — POLYETHYLENE GLYCOL 3350 17 G/17G
17 POWDER, FOR SOLUTION ORAL
Refills: 0 | Status: DISCONTINUED | OUTPATIENT
Start: 2025-04-22 | End: 2025-04-22

## 2025-04-22 RX ORDER — HYDROMORPHONE/SOD CHLOR,ISO/PF 2 MG/10 ML
1 SYRINGE (ML) INJECTION
Qty: 36 | Refills: 0
Start: 2025-04-22 | End: 2025-04-27

## 2025-04-22 RX ORDER — LACOSAMIDE 150 MG/1
2 TABLET, FILM COATED ORAL
Qty: 120 | Refills: 3
Start: 2025-04-22 | End: 2025-08-19

## 2025-04-22 RX ORDER — POLYETHYLENE GLYCOL 3350 17 G/17G
17 POWDER, FOR SOLUTION ORAL
Qty: 238 | Refills: 0
Start: 2025-04-22 | End: 2025-04-28

## 2025-04-22 RX ORDER — NALOXONE HYDROCHLORIDE 0.4 MG/ML
1 INJECTION, SOLUTION INTRAMUSCULAR; INTRAVENOUS; SUBCUTANEOUS
Qty: 1 | Refills: 0
Start: 2025-04-22 | End: 2025-04-22

## 2025-04-22 RX ADMIN — Medication 4 MILLIGRAM(S): at 09:20

## 2025-04-22 RX ADMIN — Medication 4 MILLIGRAM(S): at 05:00

## 2025-04-22 RX ADMIN — Medication 1000 MILLIGRAM(S): at 06:45

## 2025-04-22 RX ADMIN — Medication 4 MILLIGRAM(S): at 00:29

## 2025-04-22 RX ADMIN — Medication 4 MILLIGRAM(S): at 01:30

## 2025-04-22 RX ADMIN — LACOSAMIDE 140 MILLIGRAM(S): 150 TABLET, FILM COATED ORAL at 11:22

## 2025-04-22 RX ADMIN — METHOCARBAMOL 750 MILLIGRAM(S): 500 TABLET, FILM COATED ORAL at 05:00

## 2025-04-22 RX ADMIN — METHOCARBAMOL 750 MILLIGRAM(S): 500 TABLET, FILM COATED ORAL at 13:12

## 2025-04-22 RX ADMIN — Medication 400 MILLIGRAM(S): at 06:15

## 2025-04-22 RX ADMIN — Medication 4 MILLIGRAM(S): at 10:00

## 2025-04-22 RX ADMIN — BRIVARACETAM 240 MILLIGRAM(S): 75 TABLET, FILM COATED ORAL at 05:01

## 2025-04-22 RX ADMIN — Medication 400 MILLIGRAM(S): at 11:23

## 2025-04-22 RX ADMIN — AMLODIPINE BESYLATE 10 MILLIGRAM(S): 10 TABLET ORAL at 05:00

## 2025-04-22 RX ADMIN — Medication 4 MILLIGRAM(S): at 06:00

## 2025-04-22 RX ADMIN — LACOSAMIDE 140 MILLIGRAM(S): 150 TABLET, FILM COATED ORAL at 05:01

## 2025-04-22 RX ADMIN — Medication 1000 MILLIGRAM(S): at 12:00

## 2025-04-22 NOTE — PHYSICAL THERAPY INITIAL EVALUATION ADULT - PERTINENT HX OF CURRENT PROBLEM, REHAB EVAL
Pt is a 56 y/o male admitted with seizures. PMH: Type A aortic dissection s/p AV resuspension, ascending aorta and hemiarch replacement, CAD, CABG x 1 (5/2013), s/p Aortic Valve Replacement, s/p TEVAR (05/2023), PE (s/p IVC filter, subsequent removal by Dr. Ellison 9/13/23, not on AC), necrotizing pancreatitis s/p exp lap 5/31/23, Symptomatic Anemia s/p multiple Blood Transfusions, 1 unit transfused last week, Seizure disorder (2018-present), reports he has been experiencing multiple seizure episodes per month, each occurrence lasting a few seconds, described as unresponsive, staring, drooling. not responding to medication.     Hospital course: Pt s/p multiple seizures. Now s/p Stage 1 Right sEEG and Stage 2 explant of electrodes (4/21).

## 2025-04-22 NOTE — DISCHARGE NOTE NURSING/CASE MANAGEMENT/SOCIAL WORK - FINANCIAL ASSISTANCE
[facility] provides services at a reduced cost to those who are determined to be eligible through [facility]’s financial assistance program. Information regarding [facility]’s financial assistance program can be found by going to [email or web address] or by calling [phone].

## 2025-04-22 NOTE — DISCHARGE NOTE PROVIDER - NSDCMRMEDTOKEN_GEN_ALL_CORE_FT
amLODIPine 10 mg oral tablet: 1 tab(s) orally once a day  Aspirin Enteric Coated 81 mg oral delayed release tablet: 1 tab(s) orally once a day  atorvastatin 20 mg oral tablet: 1 tab(s) orally once a day (at bedtime)  Briviact 100 mg oral tablet: 1 tab(s) orally 2 times a day MDD: 200mg  hydroCHLOROthiazide 12.5 mg oral capsule: 1 cap(s) orally once a day  lacosamide 150 mg oral tablet: 2 tab(s) orally 2 times a day MDD: 600mg  pantoprazole 40 mg oral delayed release tablet: 1 tab(s) orally once a day (before a meal)   acetaminophen 325 mg oral tablet: 2 tab(s) orally every 6 hours as needed for  moderate pain  amLODIPine 10 mg oral tablet: 1 tab(s) orally once a day  Aspirin Enteric Coated 81 mg oral delayed release tablet: 1 tab(s) orally once a day  atorvastatin 20 mg oral tablet: 1 tab(s) orally once a day (at bedtime)  Briviact 100 mg oral tablet: 1 tab(s) orally 2 times a day MDD: 200mg  hydroCHLOROthiazide 12.5 mg oral capsule: 1 cap(s) orally once a day  HYDROmorphone 4 mg oral tablet: 1 tab(s) orally every 4 hours as needed for Severe Pain (7 - 10) MDD: 6  lacosamide 150 mg oral tablet: 2 tab(s) orally 2 times a day MDD: 600mg  methocarbamol 750 mg oral tablet: 1 tab(s) orally every 8 hours  Narcan 4 mg/0.1 mL nasal spray: 1 spray(s) intranasally once a day as needed for prevent opioid overdose  pantoprazole 40 mg oral delayed release tablet: 1 tab(s) orally once a day (before a meal)  polyethylene glycol 3350 oral powder for reconstitution: 17 gram(s) orally 2 times a day as needed for  constipation  senna leaf extract oral tablet: 2 tab(s) orally once a day (at bedtime)

## 2025-04-22 NOTE — DISCHARGE NOTE PROVIDER - NSDCCPCAREPLAN_GEN_ALL_CORE_FT
PRINCIPAL DISCHARGE DIAGNOSIS  Diagnosis: Seizures  Assessment and Plan of Treatment: Patient went to OR on 4/14 and had a right sided SEEG placement -11 electrodes and 1 pericranial electrode. Post procedure patient was moved to icu abd was monitored. Patient had post op mri and it was stable. Patient was moved to floor once stable. Patient was followed by neurology. Patient was off his AEDS. Patient had few seizure episodes and events were captured. Patient was restarted on his AEDS by neurology. Patient went for explant on 4/21. Patient had post removal ct head and it showed a small hematoma on the right side and repeat ct head was stable. Patient was seen by physical therapy and was cleared for discharge to home. Patient voided and had bowel movement. Patient was medically and neurologically stable for discharge. Patient was discharged home with follow up instructions.   - May take shower from 4/25. Let water run over surgical site and pat dry after shower.   -If notice having any new weakness , severe pain, nausea, vomiting, seizures , fever , discharge or opening at surgical site then please contact Dr. Rowe's office or come back to emergency room.   -Please continue on home AEDS.      SECONDARY DISCHARGE DIAGNOSES  Diagnosis: HTN (hypertension)  Assessment and Plan of Treatment: -Continue on amlodipine and Hydrochlorothiazide.     PRINCIPAL DISCHARGE DIAGNOSIS  Diagnosis: Seizures  Assessment and Plan of Treatment: Patient went to OR on 4/14 and had a right sided SEEG placement -11 electrodes and 1 pericranial electrode. Post procedure patient was moved to icu abd was monitored. Patient had post op mri and it was stable. Patient was moved to floor once stable. Patient was followed by neurology. Patient was off his AEDS. Patient had few seizure episodes and events were captured. Patient was restarted on his AEDS by neurology. Patient went for explant on 4/21. Patient had post removal ct head and it showed a small hematoma on the right side and repeat ct head was stable. Patient was seen by physical therapy and was cleared for discharge to home. Patient voided and had bowel movement. Patient was medically and neurologically stable for discharge. Patient was discharged home with follow up instructions.   - May take shower from 4/25. Let water run over surgical site and pat dry after shower.   -If notice having any new weakness , severe pain, nausea, vomiting, seizures , fever , discharge or opening at surgical site then please contact Dr. Rowe's office or come back to emergency room.   -Please continue on home AEDS.      SECONDARY DISCHARGE DIAGNOSES  Diagnosis: HTN (hypertension)  Assessment and Plan of Treatment: -Continue on amlodipine and Hydrochlorothiazide.    Diagnosis: H/O echocardiogram  Assessment and Plan of Treatment: - has severe left ventricular outflow tract obstruction.   -Please follow up with peronal cardiologist as outpatient.

## 2025-04-22 NOTE — PROGRESS NOTE ADULT - THIS PATIENT HAS THE FOLLOWING CONDITION(S)/DIAGNOSES ON THIS ADMISSION:
Functional Quadriplegia
None

## 2025-04-22 NOTE — PROGRESS NOTE ADULT - NUTRITIONAL ASSESSMENT
This patient has been assessed with a concern for Malnutrition and has been determined to have a diagnosis/diagnoses of Severe protein-calorie malnutrition.    This patient is being managed with:   Diet NPO after Midnight-     NPO Start Date: 20-Apr-2025   NPO Start Time: 23:59  Except Medications  Entered: Apr 20 2025 12:54PM    Diet Regular-  Supplement Feeding Modality:  Oral  Ensure Plus High Protein Cans or Servings Per Day:  2       Frequency:  Daily  Entered: Apr 15 2025  1:59PM  
This patient has been assessed with a concern for Malnutrition and has been determined to have a diagnosis/diagnoses of Severe protein-calorie malnutrition.    This patient is being managed with:   Diet Regular-  Supplement Feeding Modality:  Oral  Ensure Plus High Protein Cans or Servings Per Day:  2       Frequency:  Daily  Entered: Apr 15 2025  1:59PM  
This patient has been assessed with a concern for Malnutrition and has been determined to have a diagnosis/diagnoses of Severe protein-calorie malnutrition.    This patient is being managed with:   Diet Regular-  Supplement Feeding Modality:  Oral  Ensure Plus High Protein Cans or Servings Per Day:  2       Frequency:  Daily  Entered: Apr 15 2025  1:59PM  
This patient has been assessed with a concern for Malnutrition and has been determined to have a diagnosis/diagnoses of Severe protein-calorie malnutrition.    This patient is being managed with:   Diet Regular-  Supplement Feeding Modality:  Oral  Ensure Plus High Protein Cans or Servings Per Day:  2       Frequency:  Daily  Entered: Apr 21 2025  2:59PM  
This patient has been assessed with a concern for Malnutrition and has been determined to have a diagnosis/diagnoses of Severe protein-calorie malnutrition.  
This patient has been assessed with a concern for Malnutrition and has been determined to have a diagnosis/diagnoses of Severe protein-calorie malnutrition.    This patient is being managed with:   Diet Regular-  Supplement Feeding Modality:  Oral  Ensure Plus High Protein Cans or Servings Per Day:  2       Frequency:  Daily  Entered: Apr 15 2025  1:59PM  
This patient has been assessed with a concern for Malnutrition and has been determined to have a diagnosis/diagnoses of Severe protein-calorie malnutrition.    This patient is being managed with:   Diet Regular-  Supplement Feeding Modality:  Oral  Ensure Plus High Protein Cans or Servings Per Day:  2       Frequency:  Daily  Entered: Apr 21 2025  2:59PM

## 2025-04-22 NOTE — DISCHARGE NOTE PROVIDER - CARE PROVIDER_API CALL
Henry Rowe  Neurosurgery  [address], [address]  [place] [address]  Phone: [phone]  Fax: [phone]  Follow Up Time: 1 week    Maryjane Isaac  Neurology  [address], [address]  [place] [address]  Phone: [phone]  Fax: [phone]  Follow Up Time: 1 week

## 2025-04-22 NOTE — PROGRESS NOTE ADULT - ASSESSMENT
55M, PMH for Type A aortic dissection s/p AV resuspension, ascending aorta and hemiarch replacement, CAD, CABG x 1 (5/2013), s/p Aortic Valve Replacement, s/p TEVAR (05/2023), PE (s/p IVC filter, subsequent removal by Dr. Ellison 9/13/23, not on AC), necrotizing pancreatitis s/p exp lap 5/31/23, Symptomatic Anemia, seizure disorder (2018-present), reports he has been experiencing multiple seizure episodes per month, each occurrence lasting a few seconds, described as unresponsive, staring, drooling. not responding to medication. Admitted for scheduled Stage 1 Right Stereo EEG for Implantation of Electrodes with Dr. Rowe on 04/14/2025.  EEG data this hospitalization  Non-Epileptiform Findings:   Focal delta at: RDa9-11, RTi9-10, RTP5-7, RTx9-11  Physiologic appearing beta/alpha at:    Interictal abnormalities:  -Frequent spikes, focal, maximum RDa1-3 / RDh1    PLAN:  - cont on brivaracetam 100 mg BID on 4/18  - cont on lacosamide 200 mg TID on 4/18  - Rescues: first-line lorazepam 1mg IV for GTC lasting >5min, second-line lacosamide 100mg IV  - follow up outpatient with NS for staple removal  -planning for surgical intervention to be discussed this week epilepsy MDC  -no contraindications to discharge from neurology standpoint  - Rest of management per Neurosurgery (primary team)    Patient seen and d/w Dr. Isaac 55M, PMH for Type A aortic dissection s/p AV resuspension, ascending aorta and hemiarch replacement, CAD, CABG x 1 (5/2013), s/p Aortic Valve Replacement, s/p TEVAR (05/2023), PE (s/p IVC filter, subsequent removal by Dr. Ellison 9/13/23, not on AC), necrotizing pancreatitis s/p exp lap 5/31/23, Symptomatic Anemia, seizure disorder (2018-present), reports he has been experiencing multiple seizure episodes per month, each occurrence lasting a few seconds, described as unresponsive, staring, drooling. not responding to medication. Admitted for scheduled Stage 1 Right Stereo EEG for Implantation of Electrodes with Dr. Rowe on 04/14/2025.  EEG data this hospitalization  Non-Epileptiform Findings:   Focal delta at: RDa9-11, RTi9-10, RTP5-7, RTx9-11  Physiologic appearing beta/alpha at:    Interictal abnormalities:  -Frequent spikes, focal, maximum RDa1-3 / RDh1    PLAN:  - cont on brivaracetam 100 mg BID on 4/18  - cont on lacosamide 200 mg TID on 4/18  - please DC risperidone  - Rescues: first-line lorazepam 1mg IV for GTC lasting >5min, second-line lacosamide 100mg IV  - follow up outpatient with NS for staple removal  -planning for surgical intervention to be discussed this week epilepsy MDC  -no contraindications to discharge from neurology standpoint  - Rest of management per Neurosurgery (primary team)    Patient seen and d/w Dr. Isaac

## 2025-04-22 NOTE — PROGRESS NOTE ADULT - PROBLEM SELECTOR PLAN 2
3 new scripts resent.  
- Loud systolic murmur on exam, known SHAJI without LVOT obstruction  - significant Cardiac hx with CAD, CABG (2013), s/p aortic valve replacement s/p TEVAR, PE (s/p IVC filter/removal)  - 4/20 TTE with normal LVSF with EF 65-70%, chordal SHAJI w/ LVOT obstruction and dyskinetic apex  - avoid afterload reduciing agents  - c/w home statin  - resume ASA when clear from NSGY standpoint for CAD hx  - will need close follow-up with Cardiology upon discharge
-Loud systolic murmur on exam, known SHAJI without LVOT obstruction  -significant Cardiac hx with CAD, CABG (2013), s/p aortic valve replacement s/p TEVAR, PE (s/p IVC filter/removal)  -cardiology following, pending TTE  -c/w home statin, ASA on hold per primary.

## 2025-04-22 NOTE — DISCHARGE NOTE PROVIDER - NSDCFUSCHEDAPPT_GEN_ALL_CORE_FT
[facility]  HEART[facility] [address]  Scheduled Appointment: 04/24/2025    [facility]  HEART[facility] [address]  Scheduled Appointment: 04/24/2025    Tom Huffman  [facility] [address]  Scheduled Appointment: 05/09/2025

## 2025-04-22 NOTE — PROGRESS NOTE ADULT - SUBJECTIVE AND OBJECTIVE BOX
DATE OF SERVICE: 04-22-25 @ 13:21    Patient is a 55y old  Male who presents with a chief complaint of event capture, electrode implant removal (22 Apr 2025 11:12)      INTERVAL HISTORY: Feels ok.     REVIEW OF SYSTEMS:  CONSTITUTIONAL: No weakness  EYES/ENT: No visual changes;  No throat pain   NECK: No pain or stiffness  RESPIRATORY: No cough, wheezing; No shortness of breath  CARDIOVASCULAR: No chest pain or palpitations  GASTROINTESTINAL: No abdominal  pain. No nausea, vomiting, or hematemesis  GENITOURINARY: No dysuria, frequency or hematuria  NEUROLOGICAL: No stroke like symptoms  SKIN: No rashes    TELEMETRY Personally reviewed: SR   	  MEDICATIONS:  amLODIPine   Tablet 10 milliGRAM(s) Oral daily  hydrochlorothiazide 12.5 milliGRAM(s) Oral daily        PHYSICAL EXAM:  T(C): 36.8 (04-22-25 @ 08:42), Max: 36.8 (04-21-25 @ 14:06)  HR: 68 (04-22-25 @ 08:42) (55 - 74)  BP: 125/78 (04-22-25 @ 11:00) (113/55 - 160/74)  RR: 18 (04-22-25 @ 08:42) (14 - 18)  SpO2: 99% (04-22-25 @ 11:00) (98% - 100%)  Wt(kg): --  I&O's Summary    21 Apr 2025 07:01  -  22 Apr 2025 07:00  --------------------------------------------------------  IN: 595 mL / OUT: 900 mL / NET: -305 mL    22 Apr 2025 07:01  -  22 Apr 2025 13:21  --------------------------------------------------------  IN: 0 mL / OUT: 300 mL / NET: -300 mL      Height (cm): 182.9 (04-21 @ 14:06)  Weight (kg): 75.7 (04-21 @ 14:06)  BMI (kg/m2): 22.6 (04-21 @ 14:06)  BSA (m2): 1.97 (04-21 @ 14:06)    Appearance: In no distress	  HEENT:    PERRL, EOMI	  Cardiovascular:  S1 S2, No JVD  Respiratory: Lungs clear to auscultation	  Gastrointestinal:  Soft, Non-tender, + BS	  Vascularature:  No edema of LE  Psychiatric: Appropriate affect   Neuro: no acute focal deficits                               9.1    7.08  )-----------( 252      ( 21 Apr 2025 06:42 )             28.9     04-21    140  |  105  |  20  ----------------------------<  85  3.6   |  23  |  0.98    Ca    9.1      21 Apr 2025 06:38  Phos  3.9     04-21  Mg     1.9     04-21          Labs personally reviewed      ASSESSMENT/PLAN: 	    56 y/o M with PMHx significant for Type A aortic dissection s/p AV resuspension, ascending aorta and hemiarch replacement, CAD, CABG x 1 (5/2013), s/p Aortic Valve Replacement, s/p TEVAR (05/2023), PE (s/p IVC filter, subsequent removal by Dr. Ellison 9/13/23, not on AC), necrotizing pancreatitis s/p exp lap 5/31/23, Symptomatic Anemia s/p multiple Blood Transfusions, 1 unit transfused last week, Seizure disorder (2018-present), reports he has been experiencing multiple seizure episodes per month, each occurrence lasting a few seconds, described as unresponsive, staring, drooling. not responding to medication. Patient is now scheduled for Stage 1 Right Stereo EEG for Implantation of Electrodes with Dr. Rowe on 04/14/2025.    Problem 1: Cardiac Risk Stratification   - Pt with some fatigue on exertion  - No evidence of tachy-bailey arrythmia  - Reviewed TTE from 10/2024 with normal biventricular function, repeat 4/20 unchanged biV function w/ LVOT obstruction  - EKG 4/18 - NSR 71 bpm  - Elevated risk for mod risk surgery. No cardiac contraindication to proceed  - Avoid afterload reducing agents if possible given LVOT obstruction     Problem 2: CAD  - Hx CABG in 2023  - C/w ASA & Atorvastatin 20mg     Problem 3: Hx Aortic Dissection & Valve replacement  - TEVAR 3/3023  - TTE 10/2024 = LVEF 65%, mod concentric LVH, normal RV size and systolic function. severely dilated LA. Bioprosthetic valve in aortic position. no evidence of AR. Probable moderate MR. Moderate TR. pHTN, PASP 54  - TTE 4/20/25 preserved EF, chordal SHAJI w/ severe LVOT obstruction, dyskinetic apex    - Avoid afterload reducing agents     Problem 4: HTN  - Well controlled  - C/w Amlodipine 10mg  - C/w HCTZ 12.5    Problem 5: Epilepsy  - Neurology and Neurosurgery following, appreciate recs  - s/p Insertion of Rt SEEG electrode lead 4/14, pending explant electrodes    Problem 6: DVT prophylaxis  - Lovenox         FRANK Lopez-NP   Ivan Morales DO [facility]  Cardiovascular Medicine  [address], [address]  Available through call or text on [employer] TE[employer]  Office: [phone]   DATE OF SERVICE: 04-22-25 @ 13:21    Patient is a 55y old  Male who presents with a chief complaint of event capture, electrode implant removal (22 Apr 2025 11:12)      INTERVAL HISTORY: Feels ok.     REVIEW OF SYSTEMS:  CONSTITUTIONAL: No weakness  EYES/ENT: No visual changes;  No throat pain   NECK: No pain or stiffness  RESPIRATORY: No cough, wheezing; No shortness of breath  CARDIOVASCULAR: No chest pain or palpitations  GASTROINTESTINAL: No abdominal  pain. No nausea, vomiting, or hematemesis  GENITOURINARY: No dysuria, frequency or hematuria  NEUROLOGICAL: No stroke like symptoms  SKIN: No rashes    TELEMETRY Personally reviewed: SR   	  MEDICATIONS:  amLODIPine   Tablet 10 milliGRAM(s) Oral daily  hydrochlorothiazide 12.5 milliGRAM(s) Oral daily        PHYSICAL EXAM:  T(C): 36.8 (04-22-25 @ 08:42), Max: 36.8 (04-21-25 @ 14:06)  HR: 68 (04-22-25 @ 08:42) (55 - 74)  BP: 125/78 (04-22-25 @ 11:00) (113/55 - 160/74)  RR: 18 (04-22-25 @ 08:42) (14 - 18)  SpO2: 99% (04-22-25 @ 11:00) (98% - 100%)  Wt(kg): --  I&O's Summary    21 Apr 2025 07:01  -  22 Apr 2025 07:00  --------------------------------------------------------  IN: 595 mL / OUT: 900 mL / NET: -305 mL    22 Apr 2025 07:01  -  22 Apr 2025 13:21  --------------------------------------------------------  IN: 0 mL / OUT: 300 mL / NET: -300 mL      Height (cm): 182.9 (04-21 @ 14:06)  Weight (kg): 75.7 (04-21 @ 14:06)  BMI (kg/m2): 22.6 (04-21 @ 14:06)  BSA (m2): 1.97 (04-21 @ 14:06)    Appearance: In no distress	  HEENT:    PERRL, EOMI	  Cardiovascular:  S1 S2, No JVD  Respiratory: Lungs clear to auscultation	  Gastrointestinal:  Soft, Non-tender, + BS	  Vascularature:  No edema of LE  Psychiatric: Appropriate affect   Neuro: no acute focal deficits                               9.1    7.08  )-----------( 252      ( 21 Apr 2025 06:42 )             28.9     04-21    140  |  105  |  20  ----------------------------<  85  3.6   |  23  |  0.98    Ca    9.1      21 Apr 2025 06:38  Phos  3.9     04-21  Mg     1.9     04-21          Labs personally reviewed      ASSESSMENT/PLAN: 	    56 y/o M with PMHx significant for Type A aortic dissection s/p AV resuspension, ascending aorta and hemiarch replacement, CAD, CABG x 1 (5/2013), s/p Aortic Valve Replacement, s/p TEVAR (05/2023), PE (s/p IVC filter, subsequent removal by Dr. Ellison 9/13/23, not on AC), necrotizing pancreatitis s/p exp lap 5/31/23, Symptomatic Anemia s/p multiple Blood Transfusions, 1 unit transfused last week, Seizure disorder (2018-present), reports he has been experiencing multiple seizure episodes per month, each occurrence lasting a few seconds, described as unresponsive, staring, drooling. not responding to medication. Patient is now scheduled for Stage 1 Right Stereo EEG for Implantation of Electrodes with Dr. Rowe on 04/14/2025.    Problem 1: Cardiac Risk Stratification   - Pt with some fatigue on exertion  - No evidence of tachy-bailey arrythmia  - Reviewed TTE from 10/2024 with normal biventricular function, repeat 4/20 unchanged biV function w/ LVOT obstruction  - EKG 4/18 - NSR 71 bpm  - Elevated risk for mod risk surgery. No cardiac contraindication to proceed  - Avoid afterload reducing agents if possible given LVOT obstruction   - Recommend DC HCTZ    Problem 2: CAD  - Hx CABG in 2023  - C/w ASA & Atorvastatin 20mg     Problem 3: Hx Aortic Dissection & Valve replacement  - TEVAR 3/3023  - TTE 10/2024 = LVEF 65%, mod concentric LVH, normal RV size and systolic function. severely dilated LA. Bioprosthetic valve in aortic position. no evidence of AR. Probable moderate MR. Moderate TR. pHTN, PASP 54  - TTE 4/20/25 preserved EF, chordal SHAJI w/ severe LVOT obstruction, dyskinetic apex    - Avoid afterload reducing agents     Problem 4: HTN  - Well controlled  - C/w Amlodipine 10mg  - C/w HCTZ 12.5    Problem 5: Epilepsy  - Neurology and Neurosurgery following, appreciate recs  - s/p Insertion of Rt SEEG electrode lead 4/14, pending explant electrodes    Problem 6: DVT prophylaxis  - Lovenox         Antonia Bella, AG-NP   Ivan Morales DO [facility]  Cardiovascular Medicine  [address] [facility], [address]  Available through call or text on [employer] TEAM[employer]  Office: [phone]

## 2025-04-22 NOTE — PROGRESS NOTE ADULT - NSPROGADDITIONALINFOA_GEN_ALL_CORE
Attending and PA/NP shared services statement (NON-critical care):     Attending to bill.     I attest my time as attending is greater than 50% of the total combined time spent on qualifying patient care activities by the PA/NP and attending.     I have made amendments to the documentation where necessary. Additional comments: Patient care and plan discussed and reviewed with Advanced Care Provider. Plan as outlined above edited by me to reflect our discussion.   In addition, I participated in    - Ordering, reviewing, and interpreting labs, testing, and imaging.  - Reviewing prior hospitalization and where necessary, outpatient records.  - Counselling and educating patient and/or family regarding interpretation of aforementioned items and plan of care.  - Communicating with other health professionals (when not separately reported), and documenting clinical information in the electronic health record.
.  Trinidad Jerome MD  Division of Hospital Medicine  [facility]   Spectra: [phone]    Plan discussed with patient and Neurosurgery LILIA Issa.

## 2025-04-22 NOTE — PROGRESS NOTE ADULT - ASSESSMENT
HPI:  56 y/o M with PMHx significant for Type A aortic dissection s/p AV resuspension, ascending aorta and hemiarch replacement, CAD, CABG x 1 (5/2013), s/p Aortic Valve Replacement, s/p TEVAR (05/2023), PE (s/p IVC filter, subsequent removal by Dr. Ellison 9/13/23, not on AC), necrotizing pancreatitis s/p exp lap 5/31/23, Symptomatic Anemia s/p multiple Blood Transfusions, 1 unit transfused last week, Seizure disorder (2018-present), reports he has been experiencing multiple seizure episodes per month, each occurrence lasting a few seconds, described as unresponsive, staring, drooling. not responding to medication. Patient is now scheduled for Stage 1 Right Stereo EEG for Implantation of Electrodes with Dr. Rowe on 04/14/2025. (11 Apr 2025 10:17)    Procedure: s/p Insertion of Rt SEEG electrode lead 4/14  s/p SEEG removal on 4/21     NEURO:  - POD4 sEEG with insertion of 11 depth electrodes on Right side.  -pod #1 from SEEG removal   - vEEG 4/15 right sided spikes  - Postop CTH small heme and repeat was stable  - Postop MRI stable  -Pain control: Tylenol, Oxy changed to dilaudid oral 4/16 with minor improvement  -Patient is back on briviact 100 bid and vimpat 200 TID   -Continue on robaxin     PULM:  SpO2 goal >94%.   - Incentive spirometry.     CV:  Hemodynamically stable   - Continue home meds amlodipine 10 daily, Lipitor 20 daily, HCTZ 12.5 daily  - echo :p   -appreciate cardiology follow up     GI:  Diet: Regular   GI prophylaxis [X] not indicated [] PPI [] other:  Bowel regimen [] colace [X] senna [x] other: Miralax, bm on 4/19     RENAL:  Fluids: IVL  voiding  replete electrolytes as needed    ENDO:   Goal euglycemia (-180)    HEME/ONC:  VTE prophylaxis: SCDs, SQL  BLE Dopp no DVTs.    ID:  Postop Ancef completed.  - Monitor for fevers.    appreciate neurology follow up   Will discuss with Dr Rowe  -35 minutes spent for patient care and answered all questions.    -will discuss with Dr. Rowe   -[employer] [id number]

## 2025-04-22 NOTE — PROGRESS NOTE ADULT - SUBJECTIVE AND OBJECTIVE BOX
Trinidad Jerome MD  Division of Hospital Medicine  [facility]  Spectra: [phone]      Patient is a 55y old  Male who presents with a chief complaint of event capture, electrode implant removal (22 Apr 2025 11:12)      SUBJECTIVE / OVERNIGHT EVENTS: no acute events overnight. denies fever, chills, headache, dizziness, chest pain, nor dyspnea.   ADDITIONAL REVIEW OF SYSTEMS:    MEDICATIONS  (STANDING):  amLODIPine   Tablet 10 milliGRAM(s) Oral daily  atorvastatin 20 milliGRAM(s) Oral at bedtime  brivaracetam  IVPB 100 milliGRAM(s) IV Intermittent every 12 hours  hydrochlorothiazide 12.5 milliGRAM(s) Oral daily  lacosamide IVPB 200 milliGRAM(s) IV Intermittent <User Schedule>  methocarbamol 750 milliGRAM(s) Oral every 8 hours  polyethylene glycol 3350 17 Gram(s) Oral two times a day  senna 2 Tablet(s) Oral at bedtime    MEDICATIONS  (PRN):  bisacodyl 5 milliGRAM(s) Oral daily PRN Constipation  HYDROmorphone   Tablet 2 milliGRAM(s) Oral every 4 hours PRN Moderate Pain (4 - 6)  HYDROmorphone   Tablet 4 milliGRAM(s) Oral every 4 hours PRN Severe Pain (7 - 10)  lacosamide Injectable 100 milliGRAM(s) IV Push once PRN 2nd line therapy, GTC lasting >5 mins refractory to ativan  LORazepam   Injectable 1 milliGRAM(s) IV Push once PRN GTC lasting >5 mins with vital sign changes  melatonin 3 milliGRAM(s) Oral at bedtime PRN Insomnia  ondansetron Injectable 4 milliGRAM(s) IV Push every 6 hours PRN Nausea and/or Vomiting      CAPILLARY BLOOD GLUCOSE        I&O's Summary    21 Apr 2025 07:01  -  22 Apr 2025 07:00  --------------------------------------------------------  IN: 595 mL / OUT: 900 mL / NET: -305 mL    22 Apr 2025 07:01  -  22 Apr 2025 11:40  --------------------------------------------------------  IN: 0 mL / OUT: 300 mL / NET: -300 mL        PHYSICAL EXAM:  Vital Signs Last 24 Hrs  T(C): 36.8 (22 Apr 2025 08:42), Max: 36.8 (21 Apr 2025 14:06)  T(F): 98.2 (22 Apr 2025 08:42), Max: 98.3 (21 Apr 2025 23:30)  HR: 68 (22 Apr 2025 08:42) (55 - 74)  BP: 121/69 (22 Apr 2025 08:42) (113/55 - 160/74)  BP(mean): 105 (21 Apr 2025 23:00) (79 - 848)  RR: 18 (22 Apr 2025 08:42) (14 - 18)  SpO2: 99% (22 Apr 2025 08:42) (98% - 100%)    Parameters below as of 22 Apr 2025 08:42  Patient On (Oxygen Delivery Method): room air      CONSTITUTIONAL: NAD, well-developed, well-groomed  EYES: PERRLA; conjunctiva and sclera clear  ENMT: Moist oral mucosa, no pharyngeal injection or exudates; normal dentition  NECK: Supple, no palpable masses; no thyromegaly  RESPIRATORY: Normal respiratory effort; lungs are clear to auscultation bilaterally  CARDIOVASCULAR: Regular rate and rhythm, normal S1 and S2, +systolic murmur; No lower extremity edema  ABDOMEN: Soft, Nondistended, Nontender to palpation, normoactive bowel sounds  MUSCULOSKELETAL: No clubbing or cyanosis of digits; no joint swelling or tenderness to palpation  PSYCH: A+O to person, place, and time; affect appropriate  NEUROLOGY: CN 2-12 are intact and symmetric; no gross sensory deficits   SKIN: No rashes; no palpable lesions    LABS:                        9.1    7.08  )-----------( 252      ( 21 Apr 2025 06:42 )             28.9     04-21    140  |  105  |  20  ----------------------------<  85  3.6   |  23  |  0.98    Ca    9.1      21 Apr 2025 06:38  Phos  3.9     04-21  Mg     1.9     04-21          Urinalysis Basic - ( 21 Apr 2025 06:38 )    Color: x / Appearance: x / SG: x / pH: x  Gluc: 85 mg/dL / Ketone: x  / Bili: x / Urobili: x   Blood: x / Protein: x / Nitrite: x   Leuk Esterase: x / RBC: x / WBC x   Sq Epi: x / Non Sq Epi: x / Bacteria: x          RADIOLOGY & ADDITIONAL TESTS:  Results Reviewed:   Imaging Personally Reviewed:  TTE:  CONCLUSIONS:      1. Left ventricular cavity is normal in size. Left ventricular systolic function is normal with an ejection fraction visually estimated at 65 to 70 %. Regional wall motion abnormalities present.   2. Devils [place] is abnormal.   3. Asymmetrical septal hypertrophy consistent with hypertrophic cardiomyopathy.   4. Chordal systolic anterior motion is present. The left ventricular outflow tract resting gradient is 8 mmHg and 78 mmHg using the Valsalva maneuverNormal right ventricular cavity size, with normal wall thickness, and mildly reduced right ventricular systolic function.   5. A bioprosthetic valve replacement valve replacement is present in the aortic position The prosthetic valve is well seated. No intravalvular regurgitation No paravalvular regurgitation.   6. The peak transaortic velocity is 3.85 m/s, peak transaortic gradient is 59.3 mmHg and mean transaortic gradient is 32.0 mmHg with an LVOT/aortic valve VTI ratio of 0.43. The effective orifice area is estimated at 1.08 cm² by the continuity equation and indexed at 0.54 cm²/m².   7. No pericardial effusion seen.   8. Comparison to previous BARBIE on 10/8/24 and TTE on 10/7/24. No doppler data obtained on BARBIE though AVR appeared to have normal function and leaflet mobility. Doppler data across AV (mean gradient, max velocity, and DVI) are similar to TTE. Chordal SHAJI is redemonstrated here though gradient was not assessed previously with valsalva. Devils [place] appears at least dyskinetic today though in previous echos was noted to be aneurysmal.    Electrocardiogram Personally Reviewed:    COORDINATION OF CARE:  Care Discussed with Consultants/Other Providers [Y]: Neurosurgery LILIA Issa  Prior or Outpatient Records Reviewed [Y/N]:

## 2025-04-22 NOTE — DISCHARGE NOTE PROVIDER - PROVIDER TOKENS
PROVIDER:[TOKEN:[[id number]],FOLLOWUP:[1 week]],PROVIDER:[TOKEN:[[id number]],FOLLOWUP:[1 week]]

## 2025-04-22 NOTE — PHYSICAL THERAPY INITIAL EVALUATION ADULT - ADDITIONAL COMMENTS
Pt lives with his wife and daughter in PH +2 steps to enter, first floor set up. Pt was independent with all mobility and ADLs prior to admission.

## 2025-04-22 NOTE — DISCHARGE NOTE NURSING/CASE MANAGEMENT/SOCIAL WORK - PATIENT PORTAL LINK FT
You can access the FollowMyHealth Patient Portal offered by [facility] by registering at the following website: [email or web address]. By joining [employer] FollowMyHealth portal, you will also be able to view your health information using other applications (apps) compatible with our system.

## 2025-04-22 NOTE — PROGRESS NOTE ADULT - PROBLEM SELECTOR PLAN 3
- c/w home amlodipine 10mg, also on home HCTZ , BP stable
-c/w home amlodipine 10mg, also on home HCTZ , BP stable

## 2025-04-22 NOTE — DISCHARGE NOTE PROVIDER - EXTENDED VTE YES NO FOR MLM ENOXAPARIN
Patient would like communication of their results via:      Home Phone: [phone] (home)  Okay to leave a message containing results? Yes     ,

## 2025-04-22 NOTE — DISCHARGE NOTE PROVIDER - HOSPITAL COURSE
Patient went to OR on 4/14 and had a right sided SEEG placement -11 electrodes and 1 pericranial electrode. Post procedure patient was moved to icu abd was monitored. Patient had post op mri and it was stable. Patient was moved to floor once stable. Patient was followed by neurology. Patient was off his AEDS. Patient had few seizure episodes and events were captured. Patient was restarted on his AEDS by neurology. Patient went for explant on 4/21. Patient had post removal ct head and it showed a small hematoma on the right side and repeat ct head was stable. Patient was seen by physical therapy and was cleared for discharge to home. Patient voided and had bowel movement. Patient was medically and neurologically stable for discharge. Patient was discharged home with follow up instructions.

## 2025-04-22 NOTE — PROGRESS NOTE ADULT - ATTENDING COMMENTS
Several additional subclinical right temporal seizures recorded overnight.  Last seizure ~10 AM.  Medications are still reaching steady state after being restarted, so will continue current doses at this time.  Feeling well this morning, has many questions about potential surgical interventions.  I answered to the best of my ability but emphasized that the final decision will be made by Dr. Isaac, Dr. Rowe, and colleagues at our multidisciplinary conference.
SEEG: interictal spikes at RDa1-3 / RDh1    DC BRV, reduce LCM to 150mg BID, seizure precautions.    Keegan Lee MD  Neurology Attending Physician
plan as above
Feeling well this morning, no complaints, ready for electrode removal tomorrow.  Continue Vimpat and Briviact.  NPO after midnight.
POD 1 R SEEG 11 depth electrode and 1 pericranial electrode   significant HA overnight  vitals reviewed, RA, afebrile  labs reviewed-->recheck CBC   meds reviewed    [id number]   cont AEDs: briviact 100mg Q12, lacosamide 300mg Q12 continue for now  epilepsy following  vEEG  MRI pending  pain control--tylenol; oxy 5/10 prn   Q1 checks  -160, HTN at baseline, cont amlodipine, HCTZ home dose  cont lipitor    RA/IS  regular diet  maintain euglycemia, JOSE RAUL  IVL once adequate PO intake  BLE dopplers negative  SQL ppx start  monitor for fevers  not critically ill 40min separate from teaching/procedures
POD 0 R SEEG 11 depth electrode and 1 pericranial electrode   c/o significant HA post op  cont AEDs: briviact 100mg Q12, lacosamide 300mg Q12 continue for now  epilepsy consult   vEEG  MRI in am  pain control--dilaudid IV push for now, dysphagia screen and start oxy prn   Q1 checks  -160, HTN at baseline, resume home meds  RA/IS  dysphagia ADAT  maintain euglycemia, JOSE RAUL  IVL once adequate PO intake  BLE dopplers  hold chemoppx  monitor for fevers
restart medications today
stable  dc home on vimpat 300 bid, briviact 100 bid  off risperidone  MDC discussion for YENNIFER RT

## 2025-04-22 NOTE — PROGRESS NOTE ADULT - REASON FOR ADMISSION
Patient was admitted for SEEG placement for seizure ficus mapping.
Patient was admitted for SEEG placement for seizure ficus mapping.
Intractable seizures
Patient was admitted for seizure monitoring with SEEG placement .
event capture, electrode implant removal
sz

## 2025-04-22 NOTE — PROGRESS NOTE ADULT - PROVIDER SPECIALTY LIST ADULT
Cardiology
Neurology
Neurology
Cardiology
NSICU
Neurosurgery
Neurosurgery
Neurology
Neurosurgery
NSICU
NSICU
Neurology
Neurosurgery
Hospitalist
Hospitalist

## 2025-04-22 NOTE — PROGRESS NOTE ADULT - SUBJECTIVE AND OBJECTIVE BOX
SUBJECTIVE:   Patient was seen and evaluated at bedside. Patient is resting in bed and is in no new acute distress.   OVERNIGHT EVENTS:   none   Vital Signs Last 24 Hrs  T(C): 36.8 (22 Apr 2025 04:46), Max: 36.8 (21 Apr 2025 14:06)  T(F): 98.3 (22 Apr 2025 04:46), Max: 98.3 (21 Apr 2025 23:30)  HR: 74 (22 Apr 2025 04:46) (55 - 74)  BP: 143/81 (22 Apr 2025 04:46) (113/55 - 160/74)  BP(mean): 105 (21 Apr 2025 23:00) (79 - 848)  RR: 18 (22 Apr 2025 04:46) (14 - 18)  SpO2: 99% (22 Apr 2025 04:46) (98% - 100%)    Parameters below as of 22 Apr 2025 04:46  Patient On (Oxygen Delivery Method): room air        PHYSICAL EXAM:    General: No Acute Distress     Neurological: Awake, alert oriented to person, place and time, Following Commands, PERRL, EOMI, Face Symmetrical, Speech Fluent, Moving all extremities, Muscle Strength normal in all four extremities, No Drift, Sensation to Light Touch Intact    Pulmonary: Clear to Auscultation, No Rales, No Rhonchi, No Wheezes     Cardiovascular: S1, S2, Regular Rate and Rhythm     Gastrointestinal: Soft, Nontender, Nondistended     Incision:   clean and dry   LABS:                        9.1    7.08  )-----------( 252      ( 21 Apr 2025 06:42 )             28.9    04-21    140  |  105  |  20  ----------------------------<  85  3.6   |  23  |  0.98    Ca    9.1      21 Apr 2025 06:38  Phos  3.9     04-21  Mg     1.9     04-21 04-21 @ 07:01  -  04-22 @ 07:00  --------------------------------------------------------  IN: 595 mL / OUT: 900 mL / NET: -305 mL      DRAINS:     MEDICATIONS:  Antibiotics:    Neuro:  acetaminophen   IVPB .. 1000 milliGRAM(s) IV Intermittent every 6 hours  brivaracetam  IVPB 100 milliGRAM(s) IV Intermittent every 12 hours  HYDROmorphone   Tablet 2 milliGRAM(s) Oral every 4 hours PRN Moderate Pain (4 - 6)  HYDROmorphone   Tablet 4 milliGRAM(s) Oral every 4 hours PRN Severe Pain (7 - 10)  lacosamide Injectable 100 milliGRAM(s) IV Push once PRN 2nd line therapy, GTC lasting >5 mins refractory to ativan  lacosamide IVPB 200 milliGRAM(s) IV Intermittent <User Schedule>  LORazepam   Injectable 1 milliGRAM(s) IV Push once PRN GTC lasting >5 mins with vital sign changes  melatonin 3 milliGRAM(s) Oral at bedtime PRN Insomnia  methocarbamol 750 milliGRAM(s) Oral every 8 hours  ondansetron Injectable 4 milliGRAM(s) IV Push every 6 hours PRN Nausea and/or Vomiting    Cardiac:  amLODIPine   Tablet 10 milliGRAM(s) Oral daily  hydrochlorothiazide 12.5 milliGRAM(s) Oral daily    Pulm:    GI/:  bisacodyl 5 milliGRAM(s) Oral daily PRN Constipation  senna 2 Tablet(s) Oral at bedtime    Other:   atorvastatin 20 milliGRAM(s) Oral at bedtime    DIET: [] Regular [] CCD [] Renal [] Puree [] Dysphagia [] Tube Feeds:   regular   IMAGING:   ACC: [id number] EXAM:  CT BRAIN   ORDERED BY:  RHINA MOHAN     PROCEDURE DATE:  04/21/2025          INTERPRETATION:  Exam Date: 4/21/2025 10:19 PM    CT head without IV contrast    CLINICAL INFORMATION:  CTH rpt 4hrs 1030p    CTH rpt 4hrs 1030p Admitting   Dxs: G40.119 LOCAL-REL SYMPTC EPI W SIMPLE PART SEIZ, NTRCT, W/O STAT EPI    TECHNIQUE: Contiguous axial sections were obtained through the head.     Coronal and sagittal reformats were obtained.    COMPARISON: CT head 4/21/2025 6:16 PM    FINDINGS:    Status post right frontal and right temporal deep brain EEG lead removal.   Small subarachnoid hemorrhage in the right temporal is reidentified   without change. Few scattered locules of pneumocephalus regions   apparently expanded.    Right frontal temporal scalp cutaneous staples.    No acute infarct is present. There is periventricular and subcortical   white matter hypodensity without mass effect, nonspecific, likely   representing mild chronic microvascular ischemic changes. Ventricles are   normal in size. There is no evidence of mass, mass effect, midline shift   or extra-axial fluid collection.      IMPRESSION:    Stable small hemorrhage in the right temporal lobe secondary to electrode   removal.    --- End of Report ---            SHOAIB SCHMIDT MD; Attending Radiologist  This document has been electronically signed. Apr 22 2025  7:26AM  ACC: [id number] EXAM:  MR BRAIN   ORDERED BY: KALLIE GROVE     PROCEDURE DATE:  04/15/2025          INTERPRETATION:  Clinical indication: Post deep EEG lead placement      Magnetic resonance imaging of the brain was carried out with transaxial,   coronal and sagittal 3-D MP Rage and axial T2-weighted series on a 1.5   Priti magnet.    Comparison is made with the prior CT of 4/14/2025.    There are multiple right-sided frontal and temporal dino hole's through   which deep brain EEG leads are inserted in the right frontal and temporal   region. No hemorrhage or edema is identified. There is no midline shift.   The ventricles are normal in size and position.    IMPRESSION: Right-sided frontal and temporal deep EEG leads. No   hemorrhage or evidence of edema.    --- End of Report ---            AMEYA IGLES MD; Attending Radiologist  This document has been electronically signed. Apr 15 2025 12:46PM

## 2025-04-22 NOTE — PROGRESS NOTE ADULT - TIME BILLING
Review of tests, imaging, labs, documents, medical management, coordination of care and counseling.
- Ordering, reviewing, and interpreting labs, testing, and imaging.  - Independently obtaining a review of systems and performing a physical exam  - Reviewing consultant documentation/recommendations.  - Counselling and educating patient regarding interpretation of aforementioned items and plan of care.

## 2025-04-22 NOTE — PROGRESS NOTE ADULT - SUBJECTIVE AND OBJECTIVE BOX
Neurology Progress Note    SUBJECTIVE/OBJECTIVE/INTERVAL EVENTS: Patient seen and examined at bedside w/ neuro attending and team.     INTERVAL HISTORY:    4/21: patient undergoing data recording w research team. Patient feels well and without complaints. Plan for explant today.  4/22: sp implant removal. Patient feels well no complaints      PAST MEDICAL & SURGICAL HISTORY:  HTN (hypertension)      Aortic dissection      CAD (coronary artery disease)      Seizure disorder      Hypertension      Status post endovascular aneurysm repair (EVAR)      Pancreatitis  hospitalized for 5 months per spouse      DVT, lower extremity      Pulmonary embolism      Anemia      Marijuana use      S/P aortic bifurcation bypass graft      S/P CABG x 1      Elective surgery  IVC filter      H/O exploratory laparotomy      H/O aortic valve repair      H/O aortic aneurysm repair        FAMILY HISTORY:      MEDICATIONS (HOME):  Home Medications:  amLODIPine 10 mg oral tablet: 1 tab(s) orally once a day (14 Apr 2025 09:27)  hydroCHLOROthiazide 12.5 mg oral capsule: 1 cap(s) orally once a day (14 Apr 2025 09:27)    MEDICATIONS  (STANDING):  amLODIPine   Tablet 10 milliGRAM(s) Oral daily  atorvastatin 20 milliGRAM(s) Oral at bedtime  brivaracetam  IVPB 100 milliGRAM(s) IV Intermittent every 12 hours  hydrochlorothiazide 12.5 milliGRAM(s) Oral daily  lacosamide IVPB 200 milliGRAM(s) IV Intermittent <User Schedule>  methocarbamol 750 milliGRAM(s) Oral every 8 hours  senna 2 Tablet(s) Oral at bedtime  sodium chloride 0.9%. 1000 milliLiter(s) (75 mL/Hr) IV Continuous <Continuous>    MEDICATIONS  (PRN):  acetaminophen     Tablet .. 650 milliGRAM(s) Oral every 6 hours PRN Mild Pain (1 - 3)  bisacodyl 5 milliGRAM(s) Oral daily PRN Constipation  HYDROmorphone   Tablet 2 milliGRAM(s) Oral every 4 hours PRN Moderate Pain (4 - 6)  HYDROmorphone   Tablet 4 milliGRAM(s) Oral every 4 hours PRN Severe Pain (7 - 10)  lacosamide Injectable 100 milliGRAM(s) IV Push once PRN 2nd line therapy, GTC lasting >5 mins refractory to ativan  LORazepam   Injectable 1 milliGRAM(s) IV Push once PRN GTC lasting >5 mins with vital sign changes  melatonin 3 milliGRAM(s) Oral at bedtime PRN Insomnia  ondansetron Injectable 4 milliGRAM(s) IV Push every 6 hours PRN Nausea and/or Vomiting    ALLERGIES/INTOLERANCES:  Allergies  No Known Allergies    Intolerances      Vital Signs Last 24 Hrs  T(C): 36.8 (22 Apr 2025 08:42), Max: 36.8 (21 Apr 2025 14:06)  T(F): 98.2 (22 Apr 2025 08:42), Max: 98.3 (21 Apr 2025 23:30)  HR: 68 (22 Apr 2025 08:42) (55 - 74)  BP: 121/69 (22 Apr 2025 08:42) (113/55 - 160/74)  BP(mean): 105 (21 Apr 2025 23:00) (79 - 848)  RR: 18 (22 Apr 2025 08:42) (14 - 18)  SpO2: 99% (22 Apr 2025 08:42) (98% - 100%)    Parameters below as of 22 Apr 2025 08:42  Patient On (Oxygen Delivery Method): room air        PHYSICAL EXAM:  mental status: awake, alert, oriented  CN: EOMI, face symmetric, tongue midline  motor: spontaneous movement thru out, follows commands      LABORATORY:  CBC                       9.1    7.08  )-----------( 252      ( 21 Apr 2025 06:42 )             28.9     Chem 04-21    140  |  105  |  20  ----------------------------<  85  3.6   |  23  |  0.98    Ca    9.1      21 Apr 2025 06:38  Phos  3.9     04-21  Mg     1.9     04-21      LFTs   Coagulopathy PT/INR - ( 20 Apr 2025 05:33 )   PT: 12.2 sec;   INR: 1.06 ratio         PTT - ( 20 Apr 2025 05:33 )  PTT:32.8 sec  Lipid Panel   A1c   Cardiac enzymes     U/A Urinalysis Basic - ( 21 Apr 2025 06:38 )    Color: x / Appearance: x / SG: x / pH: x  Gluc: 85 mg/dL / Ketone: x  / Bili: x / Urobili: x   Blood: x / Protein: x / Nitrite: x   Leuk Esterase: x / RBC: x / WBC x   Sq Epi: x / Non Sq Epi: x / Bacteria: x

## 2025-04-24 ENCOUNTER — APPOINTMENT (OUTPATIENT)
Dept: HEART AND VASCULAR | Facility: CLINIC | Age: 55
End: 2025-04-24

## 2025-04-25 PROBLEM — D64.9 ANEMIA, UNSPECIFIED: Chronic | Status: ACTIVE | Noted: 2025-04-11

## 2025-04-25 PROBLEM — F12.90 CANNABIS USE, UNSPECIFIED, UNCOMPLICATED: Chronic | Status: ACTIVE | Noted: 2025-04-11

## 2025-05-02 ENCOUNTER — APPOINTMENT (OUTPATIENT)
Dept: NEUROSURGERY | Facility: CLINIC | Age: 55
End: 2025-05-02
Payer: MEDICARE

## 2025-05-02 VITALS
DIASTOLIC BLOOD PRESSURE: 61 MMHG | SYSTOLIC BLOOD PRESSURE: 99 MMHG | HEART RATE: 67 BPM | BODY MASS INDEX: 23.16 KG/M2 | WEIGHT: 171 LBS | TEMPERATURE: 99 F | RESPIRATION RATE: 17 BRPM | OXYGEN SATURATION: 99 % | HEIGHT: 72 IN

## 2025-05-02 DIAGNOSIS — G40.219 LOCALIZATION-RELATED (FOCAL) (PARTIAL) SYMPTOMATIC EPILEPSY AND EPILEPTIC SYNDROMES WITH COMPLEX PARTIAL SEIZURES, INTRACTABLE, W/OUT STATUS EPILEPTICUS: ICD-10-CM

## 2025-05-02 PROCEDURE — 99024 POSTOP FOLLOW-UP VISIT: CPT

## 2025-05-09 ENCOUNTER — APPOINTMENT (OUTPATIENT)
Dept: FAMILY MEDICINE | Facility: CLINIC | Age: 55
End: 2025-05-09

## 2025-05-12 ENCOUNTER — APPOINTMENT (OUTPATIENT)
Dept: NEUROLOGY | Facility: CLINIC | Age: 55
End: 2025-05-12

## 2025-05-13 ENCOUNTER — APPOINTMENT (OUTPATIENT)
Dept: NEUROLOGY | Facility: CLINIC | Age: 55
End: 2025-05-13
Payer: MEDICARE

## 2025-05-13 VITALS
SYSTOLIC BLOOD PRESSURE: 96 MMHG | WEIGHT: 170 LBS | DIASTOLIC BLOOD PRESSURE: 55 MMHG | BODY MASS INDEX: 23.03 KG/M2 | HEART RATE: 60 BPM | HEIGHT: 72 IN

## 2025-05-13 DIAGNOSIS — G40.219 LOCALIZATION-RELATED (FOCAL) (PARTIAL) SYMPTOMATIC EPILEPSY AND EPILEPTIC SYNDROMES WITH COMPLEX PARTIAL SEIZURES, INTRACTABLE, W/OUT STATUS EPILEPTICUS: ICD-10-CM

## 2025-05-13 PROCEDURE — 99214 OFFICE O/P EST MOD 30 MIN: CPT

## 2025-05-23 ENCOUNTER — APPOINTMENT (OUTPATIENT)
Dept: MRI IMAGING | Facility: CLINIC | Age: 55
End: 2025-05-23
Payer: MEDICARE

## 2025-05-23 PROCEDURE — 70553 MRI BRAIN STEM W/O & W/DYE: CPT

## 2025-05-23 PROCEDURE — A9585: CPT

## 2025-06-10 ENCOUNTER — RX RENEWAL (OUTPATIENT)
Age: 55
End: 2025-06-10

## 2025-06-14 RX ORDER — LEVETIRACETAM 500 MG/1
500 TABLET, FILM COATED ORAL
Qty: 180 | Refills: 0 | Status: ACTIVE | COMMUNITY
Start: 2025-06-13 | End: 1900-01-01

## 2025-06-30 NOTE — PROGRESS NOTE ADULT - ASSESSMENT
53-year-old male with PMHx of HTN, Type A aortic dissection s/p Dacron grafts, AV resuspension in 2013, CAD s/p CABG x 1 SVG to RCA (05/2013, Dr. Medina), seizure disorder (last episode on 07/04/2022) S/P replacement of transverse aortic arch, second stage thoracic endovascular aortic repair, aorto-axillary bypass, AV replacement (bio 23mm), in APr 2023 and CABG x 1 (SVG-RCA) EF 75% (Ignacio, 03/2020) now s/p 2nd state TEVAR (05/05/2023) prompting General Surgery consult for acute pancreatitis with large peripancreatic/perigastric fluid collections on CTA abdomen 05/08/2023 followed by acute hemorrhage starting 05/12/2023 requiring 11 U pRBC, but with negative mesenteric angiogram 05/13/2023 prompting Hepatobiliary Surgery reconsult for possible hemorrhagic pancreatitis, 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  Patient is s/p OR washout (05/31/2023), drains (RP, LUQ, pelvis) placement, and abthera wound vac. S/p percutaneous tracheostomy 06/05.    Plan:    -Wean off sedation  -F/u EEG   -F/u neurology recs  -F/u Pulm recs  -F/u IR  -Monitor NGT output  -Continue TFs  -Monitor drain output  -Continue bulb flushes and to gravity after flushing finished  -Rest of excellent care per SICU   -Surgery team1 following     Initiate Treatment: pharmacy compounding accessory \\nQuantity: 30.0 g  Days Supply: 30\\nSig: Ivermectin 1%- APPLY TO AFFECTED AREAS OF FACE ONCE DAILY Render In Strict Bullet Format?: No Detail Level: Zone

## 2025-07-14 ENCOUNTER — APPOINTMENT (OUTPATIENT)
Dept: NEUROLOGY | Facility: CLINIC | Age: 55
End: 2025-07-14
Payer: MEDICARE

## 2025-07-14 VITALS
HEIGHT: 72 IN | DIASTOLIC BLOOD PRESSURE: 67 MMHG | WEIGHT: 170 LBS | BODY MASS INDEX: 23.03 KG/M2 | SYSTOLIC BLOOD PRESSURE: 106 MMHG | HEART RATE: 60 BPM

## 2025-07-14 PROCEDURE — 99214 OFFICE O/P EST MOD 30 MIN: CPT

## 2025-07-14 RX ORDER — BRIVARACETAM 100 MG/1
100 TABLET, FILM COATED ORAL TWICE DAILY
Qty: 180 | Refills: 1 | Status: ACTIVE | COMMUNITY
Start: 2025-07-14 | End: 2026-01-10

## 2025-07-25 ENCOUNTER — APPOINTMENT (OUTPATIENT)
Dept: NEUROSURGERY | Facility: CLINIC | Age: 55
End: 2025-07-25
Payer: MEDICARE

## 2025-07-25 ENCOUNTER — NON-APPOINTMENT (OUTPATIENT)
Age: 55
End: 2025-07-25

## 2025-07-25 VITALS
DIASTOLIC BLOOD PRESSURE: 67 MMHG | SYSTOLIC BLOOD PRESSURE: 102 MMHG | RESPIRATION RATE: 17 BRPM | OXYGEN SATURATION: 97 % | WEIGHT: 170 LBS | BODY MASS INDEX: 23.03 KG/M2 | HEART RATE: 60 BPM | HEIGHT: 72 IN

## 2025-07-25 DIAGNOSIS — G40.219 LOCALIZATION-RELATED (FOCAL) (PARTIAL) SYMPTOMATIC EPILEPSY AND EPILEPTIC SYNDROMES WITH COMPLEX PARTIAL SEIZURES, INTRACTABLE, W/OUT STATUS EPILEPTICUS: ICD-10-CM

## 2025-07-25 PROCEDURE — 99214 OFFICE O/P EST MOD 30 MIN: CPT | Mod: 57

## 2025-08-05 ENCOUNTER — OUTPATIENT (OUTPATIENT)
Dept: OUTPATIENT SERVICES | Facility: HOSPITAL | Age: 55
LOS: 1 days | End: 2025-08-05
Payer: MEDICARE

## 2025-08-05 VITALS
HEART RATE: 60 BPM | RESPIRATION RATE: 17 BRPM | OXYGEN SATURATION: 97 % | SYSTOLIC BLOOD PRESSURE: 102 MMHG | DIASTOLIC BLOOD PRESSURE: 67 MMHG | HEIGHT: 72 IN | TEMPERATURE: 99 F

## 2025-08-05 DIAGNOSIS — Z01.818 ENCOUNTER FOR OTHER PREPROCEDURAL EXAMINATION: ICD-10-CM

## 2025-08-05 DIAGNOSIS — Z98.890 OTHER SPECIFIED POSTPROCEDURAL STATES: Chronic | ICD-10-CM

## 2025-08-05 DIAGNOSIS — Z95.828 PRESENCE OF OTHER VASCULAR IMPLANTS AND GRAFTS: Chronic | ICD-10-CM

## 2025-08-05 DIAGNOSIS — Z41.9 ENCOUNTER FOR PROCEDURE FOR PURPOSES OTHER THAN REMEDYING HEALTH STATE, UNSPECIFIED: Chronic | ICD-10-CM

## 2025-08-05 DIAGNOSIS — I10 ESSENTIAL (PRIMARY) HYPERTENSION: ICD-10-CM

## 2025-08-05 DIAGNOSIS — R56.9 UNSPECIFIED CONVULSIONS: ICD-10-CM

## 2025-08-05 DIAGNOSIS — Z95.1 PRESENCE OF AORTOCORONARY BYPASS GRAFT: Chronic | ICD-10-CM

## 2025-08-05 DIAGNOSIS — G40.219 LOCALIZATION-RELATED (FOCAL) (PARTIAL) SYMPTOMATIC EPILEPSY AND EPILEPTIC SYNDROMES WITH COMPLEX PARTIAL SEIZURES, INTRACTABLE, WITHOUT STATUS EPILEPTICUS: ICD-10-CM

## 2025-08-05 LAB
ANION GAP SERPL CALC-SCNC: 14 MMOL/L — SIGNIFICANT CHANGE UP (ref 5–17)
BLD GP AB SCN SERPL QL: NEGATIVE — SIGNIFICANT CHANGE UP
BUN SERPL-MCNC: 20 MG/DL — SIGNIFICANT CHANGE UP (ref 7–23)
CALCIUM SERPL-MCNC: 9.2 MG/DL — SIGNIFICANT CHANGE UP (ref 8.4–10.5)
CHLORIDE SERPL-SCNC: 105 MMOL/L — SIGNIFICANT CHANGE UP (ref 96–108)
CO2 SERPL-SCNC: 24 MMOL/L — SIGNIFICANT CHANGE UP (ref 22–31)
CREAT SERPL-MCNC: 1.16 MG/DL — SIGNIFICANT CHANGE UP (ref 0.5–1.3)
EGFR: 74 ML/MIN/1.73M2 — SIGNIFICANT CHANGE UP
EGFR: 74 ML/MIN/1.73M2 — SIGNIFICANT CHANGE UP
GLUCOSE SERPL-MCNC: 124 MG/DL — HIGH (ref 70–99)
HCT VFR BLD CALC: 28.7 % — LOW (ref 39–50)
HGB BLD-MCNC: 8.7 G/DL — LOW (ref 13–17)
MCHC RBC-ENTMCNC: 27.3 PG — SIGNIFICANT CHANGE UP (ref 27–34)
MCHC RBC-ENTMCNC: 30.3 G/DL — LOW (ref 32–36)
MCV RBC AUTO: 90 FL — SIGNIFICANT CHANGE UP (ref 80–100)
NRBC # BLD AUTO: 0 K/UL — SIGNIFICANT CHANGE UP (ref 0–0)
NRBC # FLD: 0 K/UL — SIGNIFICANT CHANGE UP (ref 0–0)
PLATELET # BLD AUTO: 227 K/UL — SIGNIFICANT CHANGE UP (ref 150–400)
PMV BLD: SIGNIFICANT CHANGE UP FL (ref 7–13)
POTASSIUM SERPL-MCNC: 4.5 MMOL/L — SIGNIFICANT CHANGE UP (ref 3.5–5.3)
POTASSIUM SERPL-SCNC: 4.5 MMOL/L — SIGNIFICANT CHANGE UP (ref 3.5–5.3)
RBC # BLD: 3.19 M/UL — LOW (ref 4.2–5.8)
RBC # FLD: 28.9 % — HIGH (ref 10.3–14.5)
RH IG SCN BLD-IMP: POSITIVE — SIGNIFICANT CHANGE UP
SODIUM SERPL-SCNC: 143 MMOL/L — SIGNIFICANT CHANGE UP (ref 135–145)
WBC # BLD: 6.58 K/UL — SIGNIFICANT CHANGE UP (ref 3.8–10.5)
WBC # FLD AUTO: 6.58 K/UL — SIGNIFICANT CHANGE UP (ref 3.8–10.5)

## 2025-08-05 PROCEDURE — 80048 BASIC METABOLIC PNL TOTAL CA: CPT

## 2025-08-05 PROCEDURE — 86901 BLOOD TYPING SEROLOGIC RH(D): CPT

## 2025-08-05 PROCEDURE — 86850 RBC ANTIBODY SCREEN: CPT

## 2025-08-05 PROCEDURE — G0463: CPT

## 2025-08-05 PROCEDURE — 86900 BLOOD TYPING SEROLOGIC ABO: CPT

## 2025-08-05 PROCEDURE — 87641 MR-STAPH DNA AMP PROBE: CPT

## 2025-08-05 PROCEDURE — 85027 COMPLETE CBC AUTOMATED: CPT

## 2025-08-05 PROCEDURE — 87640 STAPH A DNA AMP PROBE: CPT

## 2025-08-06 ENCOUNTER — APPOINTMENT (OUTPATIENT)
Dept: HEART AND VASCULAR | Facility: CLINIC | Age: 55
End: 2025-08-06
Payer: MEDICARE

## 2025-08-06 VITALS
OXYGEN SATURATION: 99 % | SYSTOLIC BLOOD PRESSURE: 103 MMHG | DIASTOLIC BLOOD PRESSURE: 55 MMHG | TEMPERATURE: 98.2 F | WEIGHT: 170 LBS | HEIGHT: 72 IN | BODY MASS INDEX: 23.03 KG/M2 | HEART RATE: 55 BPM

## 2025-08-06 DIAGNOSIS — I71.23 ANEURYSM OF THE DESCENDING THORACIC AORTA, WITHOUT RUPTURE: ICD-10-CM

## 2025-08-06 LAB
MRSA PCR RESULT.: SIGNIFICANT CHANGE UP
S AUREUS DNA NOSE QL NAA+PROBE: SIGNIFICANT CHANGE UP

## 2025-08-06 PROCEDURE — 99214 OFFICE O/P EST MOD 30 MIN: CPT

## 2025-08-06 PROCEDURE — 93000 ELECTROCARDIOGRAM COMPLETE: CPT

## 2025-08-06 PROCEDURE — G2211 COMPLEX E/M VISIT ADD ON: CPT

## 2025-08-08 ENCOUNTER — APPOINTMENT (OUTPATIENT)
Dept: NEUROLOGY | Facility: CLINIC | Age: 55
End: 2025-08-08

## 2025-08-08 PROCEDURE — 96139 PSYCL/NRPSYC TST TECH EA: CPT | Mod: NC

## 2025-08-08 PROCEDURE — 96133 NRPSYC TST EVAL PHYS/QHP EA: CPT

## 2025-08-18 ENCOUNTER — APPOINTMENT (OUTPATIENT)
Dept: MRI IMAGING | Facility: CLINIC | Age: 55
End: 2025-08-18
Payer: MEDICARE

## 2025-08-18 PROCEDURE — A9585: CPT | Mod: JW

## 2025-08-18 PROCEDURE — 70553 MRI BRAIN STEM W/O & W/DYE: CPT

## 2025-08-26 ENCOUNTER — APPOINTMENT (OUTPATIENT)
Dept: NEUROSURGERY | Facility: HOSPITAL | Age: 55
End: 2025-08-26

## 2025-08-26 ENCOUNTER — OUTPATIENT (OUTPATIENT)
Dept: OUTPATIENT SERVICES | Facility: HOSPITAL | Age: 55
LOS: 1 days | End: 2025-08-26
Payer: MEDICARE

## 2025-08-26 DIAGNOSIS — Z95.828 PRESENCE OF OTHER VASCULAR IMPLANTS AND GRAFTS: Chronic | ICD-10-CM

## 2025-08-26 DIAGNOSIS — Z95.1 PRESENCE OF AORTOCORONARY BYPASS GRAFT: Chronic | ICD-10-CM

## 2025-08-26 DIAGNOSIS — G40.219 LOCALIZATION-RELATED (FOCAL) (PARTIAL) SYMPTOMATIC EPILEPSY AND EPILEPTIC SYNDROMES WITH COMPLEX PARTIAL SEIZURES, INTRACTABLE, WITHOUT STATUS EPILEPTICUS: ICD-10-CM

## 2025-08-26 DIAGNOSIS — Z41.9 ENCOUNTER FOR PROCEDURE FOR PURPOSES OTHER THAN REMEDYING HEALTH STATE, UNSPECIFIED: Chronic | ICD-10-CM

## 2025-08-26 DIAGNOSIS — Z98.890 OTHER SPECIFIED POSTPROCEDURAL STATES: Chronic | ICD-10-CM

## 2025-08-26 PROCEDURE — 86850 RBC ANTIBODY SCREEN: CPT

## 2025-08-26 PROCEDURE — 86901 BLOOD TYPING SEROLOGIC RH(D): CPT

## 2025-08-26 PROCEDURE — 86900 BLOOD TYPING SEROLOGIC ABO: CPT

## 2025-08-28 ENCOUNTER — RX RENEWAL (OUTPATIENT)
Age: 55
End: 2025-08-28

## 2025-09-10 ENCOUNTER — NON-APPOINTMENT (OUTPATIENT)
Age: 55
End: 2025-09-10

## (undated) DEVICE — FRAZIER SUCTION TIP 8FR

## (undated) DEVICE — DRSG TELFA 3 X 8

## (undated) DEVICE — DRILL BIT PMT CORP 254MM

## (undated) DEVICE — SYS DEL CONTROLLER ANGIOTOUCH

## (undated) DEVICE — DRSG CURITY GAUZE SPONGE 4 X 4" 12-PLY

## (undated) DEVICE — SUT DOUBLE 6 WIRE STERNAL

## (undated) DEVICE — ELCTR BIPOLAR CORD J&J 12FT DISP

## (undated) DEVICE — PACK HYBRID LHH

## (undated) DEVICE — TEMP PROBE 18MM X 22GA MYOCARDIAL

## (undated) DEVICE — TORQUE DEVICE FOR GUIDEWIRE 0.0100.038"

## (undated) DEVICE — TUBING SUCTION 20FT

## (undated) DEVICE — DRSG TRACH DRAINAGE 4X4

## (undated) DEVICE — POSITIONER PINK PAD PIGAZZI SYSTEM XL W ARM PROTECTOR

## (undated) DEVICE — DRAPE PROBE COVER LATEX FREE 3X96"

## (undated) DEVICE — POSITIONER LEKSELL FIRMFIX 2

## (undated) DEVICE — TUBING BRAT 2 SUCTION ASSEMBLY TWIST LOCK

## (undated) DEVICE — DRSG TAPE UMBILICAL COTTON 2" X 30 X 1/8"

## (undated) DEVICE — DRSG TEGADERM 4X4.75"

## (undated) DEVICE — DRSG DERMABOND 0.7ML

## (undated) DEVICE — MARKING PEN W RULER

## (undated) DEVICE — PACK PROCEDURE HARVEST SMARTPREP APC-60

## (undated) DEVICE — PHRENIC NERVE PAD MEDIUM

## (undated) DEVICE — SUT PROLENE 4-0 36" RB-1

## (undated) DEVICE — POOLE SUCTION TIP

## (undated) DEVICE — DRILL TWIST 2.2MM

## (undated) DEVICE — Device

## (undated) DEVICE — POSITIONER LEKSELL FIRMFIX 4

## (undated) DEVICE — MANIFOLD ANGIO AUTOMD TRNDCR

## (undated) DEVICE — DRAPE 1/2 SHEET 40X57"

## (undated) DEVICE — VESSEL LOOP MAXI-BLUE 0.120" X 16"

## (undated) DEVICE — ELCTR THUNDERBEAT HANDPIECE 5MM X 20CM FRONT CONTROL

## (undated) DEVICE — WARMING BLANKET FULL ADULT

## (undated) DEVICE — VASCULAR DILATOR KIT 8,12,16,20, 24FR

## (undated) DEVICE — GEL AQUSNC PACKET 20GR

## (undated) DEVICE — VENTING ADAPTER "Y" (RED/BLUE) 7.5"

## (undated) DEVICE — URETERAL CATH RED RUBBER 14FR (GREEN)

## (undated) DEVICE — VENODYNE/SCD SLEEVE CALF MEDIUM

## (undated) DEVICE — DRAPE IOBAN 33" X 23"

## (undated) DEVICE — DRSG BIOPATCH DISK W CHG 1" W 4.0MM HOLE

## (undated) DEVICE — SUT VICRYL 4-0 27" RB-1 UNDYED

## (undated) DEVICE — SUT PROLENE 5-0 30" RB-2

## (undated) DEVICE — GLV 7 PROTEXIS (WHITE)

## (undated) DEVICE — SUT PLEDGET 9MM X 4MM X 1.5MM

## (undated) DEVICE — SUT STAINLESS STEEL 7 4-18" CCS

## (undated) DEVICE — GLV 7.5 PROTEXIS (WHITE)

## (undated) DEVICE — KIT APPLICATOR SPRAY FOR HARVEST SYSTEM

## (undated) DEVICE — INS ST DBL SAFEJAW FGRTY

## (undated) DEVICE — SUT VICRYL 3-0 18" TIES UNDYED

## (undated) DEVICE — DRAPE PROBE COVER 5" X 96"

## (undated) DEVICE — PACK VP SHUNT

## (undated) DEVICE — POSITIONER LEKSELL VANTAGE HEAD FRAME BAND

## (undated) DEVICE — LAP PAD 18 X 18"

## (undated) DEVICE — DRAPE X-DRAPE W CUT OUT 12X17" .25MM LEAD

## (undated) DEVICE — POSITIONER FOAM EGG CRATE ULNAR 2PCS (PINK)

## (undated) DEVICE — DRSG MEPILEX 10 X 25CM (4 X 10") AG

## (undated) DEVICE — SOL IRR POUR H2O 250ML

## (undated) DEVICE — NDL COUNTER FOAM AND MAGNET 20-40

## (undated) DEVICE — SUT SILK 2-0 18" SH (POP-OFF)

## (undated) DEVICE — SUT PROLENE 3-0 36" SH-1

## (undated) DEVICE — PACK EXPLORATORY LAPAROTOMY

## (undated) DEVICE — SUT PDS II 5-0 30" C-1

## (undated) DEVICE — ELCTR BOVIE TIP BLADE INSULATED 3" EDGE

## (undated) DEVICE — ELCTR COLORADO 3CM

## (undated) DEVICE — SUT ETHILON 2-0 18" PS-2

## (undated) DEVICE — SUT VICRYL 1 36" CTX UNDYED

## (undated) DEVICE — SUT BOOT STANDARD (ORIGINAL YELLOW) 5 PAIR

## (undated) DEVICE — WARMING BLANKET UPPER ADULT

## (undated) DEVICE — SYR LUER LOK 10CC

## (undated) DEVICE — PACK ANGIOGRAM LNX SURGICOUNT

## (undated) DEVICE — SUT PROLENE 6-0 30" RB-2

## (undated) DEVICE — IRRIGATION TRAY W PISTON SYRINGE 60ML

## (undated) DEVICE — WARMING BLANKET LOWER ADULT

## (undated) DEVICE — ELCTR GROUNDING PAD ADULT COVIDIEN

## (undated) DEVICE — SUT MONOCRYL 4-0 27" PS-2 UNDYED

## (undated) DEVICE — FEEDING TUBE NG ARGYLE PVC 8FR 107CM ENFIT

## (undated) DEVICE — GLV 6.5 PROTEXIS (WHITE)

## (undated) DEVICE — FOLEY TRAY 16FR 5CC LF UMETER CLOSED

## (undated) DEVICE — DRAPE FLUID WARMER 44 X 66"

## (undated) DEVICE — ELCTR STRYKER NEPTUNE SMOKE EVACUATION PENCIL (GREEN)

## (undated) DEVICE — SUT PLEDGET SOFT LARGE 3/8" X 3/16" X 1/16" X6

## (undated) DEVICE — ELCTR CAUTERY HI TEMP SHAFT EXT 2"

## (undated) DEVICE — DRSG VAC GRANUFOAM LARGE (BLACK)

## (undated) DEVICE — CATH TRIOX OXIMETRY 8F 3 LUMENS

## (undated) DEVICE — SUT NUROLON 1 18" OS-8 (POP-OFF)

## (undated) DEVICE — DRSG VAC ABTHERS

## (undated) DEVICE — DRAIN RESERVOIR FOR JACKSON PRATT 100CC CARDINAL

## (undated) DEVICE — SUMP INTRACARDIAC/PERICARDIAL 20FR 1/4" ADULT

## (undated) DEVICE — DRAPE SHOWER CURTAIN ISOLATION

## (undated) DEVICE — ELCTR REM POLYHESIVE ADULT PT RETURN 15FT

## (undated) DEVICE — SYR LUER LOK 20CC

## (undated) DEVICE — DRAPE LIGHT HANDLE COVER (GREEN)

## (undated) DEVICE — PACK PROC CV DRAPE

## (undated) DEVICE — SUCTION CATH AIRLIFE CONTROL VALVE TRIFLO 14FR

## (undated) DEVICE — SUT VICRYL 2-0 18" TIES

## (undated) DEVICE — PACK OPEN HEART LNX

## (undated) DEVICE — SUT PLEDGET SOFT MEDIUM 1/4" X 1/8" X 1/16" X6

## (undated) DEVICE — SUT PROLENE 4-0 36" SH

## (undated) DEVICE — STAPLER SKIN VISI-STAT 35 WIDE

## (undated) DEVICE — SUT PDS II 4-0 27" RB-1

## (undated) DEVICE — SPECIMEN CONTAINER 4OZ

## (undated) DEVICE — PACK UPPER BODY

## (undated) DEVICE — GLV 6 PROTEXIS (WHITE)

## (undated) DEVICE — GLV 8 PROTEXIS (WHITE)

## (undated) DEVICE — SUT ETHIBOND 3-0 36" RB-1

## (undated) DEVICE — SUT SURGIPRO 0 30" GS-24

## (undated) DEVICE — SUT VICRYL PLUS 0 27" CT

## (undated) DEVICE — DRAPE COVER DOME COVEX 27"

## (undated) DEVICE — PACK BASIC GOWN

## (undated) DEVICE — SUT PROLENE 3-0 36" SH

## (undated) DEVICE — STAPLER SKIN MULTI DIRECTION W35

## (undated) DEVICE — TUBING SMOKE EVAC 3/8" X 10FT FOR NEPTUNE

## (undated) DEVICE — ACCUDRAIN EXTERNAL CSF

## (undated) DEVICE — GLV 8.5 PROTEXIS (WHITE)

## (undated) DEVICE — BLADE SURGICAL #10 CARBON

## (undated) DEVICE — SUT PLEDGET CV FELT SQ PTFE 8 X 8MM

## (undated) DEVICE — STAPLER SKIN PROXIMATE

## (undated) DEVICE — STYLET GUIDED FOR DEPTH PLACEMENT

## (undated) DEVICE — SUT VICRYL 2-0 27" CT-1 UNDYED

## (undated) DEVICE — SAW BLADE STRYKER FAN OFFSET 53 X 40 X 0.38MM

## (undated) DEVICE — STAPLER COVIDIEN ENDO GIA SHORT HANDLE

## (undated) DEVICE — SUT SILK 5-0 60" TIES

## (undated) DEVICE — SAW BLADE STRYKER MICRO SAGITTAL OFFSET 13X0.51X50MM

## (undated) DEVICE — SUCTION YANKAUER BULBOUS TIP W VENT

## (undated) DEVICE — PREP BETADINE KIT

## (undated) DEVICE — POSITIONER LEKSELL FIRMFIX 3

## (undated) DEVICE — SUT PROLENE 5-0 36" RB-1

## (undated) DEVICE — DRAPE TOWEL BLUE 17" X 24"

## (undated) DEVICE — GAUZE SPONGE 12PLY DMT MT 8X4

## (undated) DEVICE — DVC TORQ PERIF 0.035

## (undated) DEVICE — SPONGE PEANUT AUTO COUNT

## (undated) DEVICE — PACING CABLE A/V TEMP SCREW DOWN 6FT

## (undated) DEVICE — SUT VICRYL 2-0 27" CT-1

## (undated) DEVICE — FOLEY TRAY 16FR LF URINE METER SURESTEP

## (undated) DEVICE — DRAPE SLUSH / WARMER 44 X 66"

## (undated) DEVICE — TUBING SUCTION NONCONDUCTIVE 6MM X 12FT

## (undated) DEVICE — GOWN TRIMAX LG

## (undated) DEVICE — CARDIOPLEGIA MANAGEMENT SET COLOR CODED CLAMPS

## (undated) DEVICE — ELCTR BOVIE PENCIL SMOKE EVACUATION

## (undated) DEVICE — CANISTER SUCTION VAC

## (undated) DEVICE — ELCTR BOVIE TIP BLADE VALLEYLAB 6.5"

## (undated) DEVICE — SUT CHROMIC 5-0 27" RB-1

## (undated) DEVICE — DRAPE SPLIT SHEET 77" X 108"

## (undated) DEVICE — FEEDING TUBE NG ARGYLE PVC 5FR 91CM

## (undated) DEVICE — DRAPE C ARM UNIVERSAL

## (undated) DEVICE — VISITEC 4X4

## (undated) DEVICE — SUT SURGIPRO 0 30" GS-22

## (undated) DEVICE — RIGID ADULT SUCKER

## (undated) DEVICE — NDL COUNTER FOAM AND MAGNET 40-70

## (undated) DEVICE — SPECIMEN CONTAINER 100ML

## (undated) DEVICE — SOL IRR POUR NS 0.9% 500ML

## (undated) DEVICE — NDL HYPO REGULAR BEVEL 25G X 1.5" (BLUE)

## (undated) DEVICE — TOURNIQUET SET 12FR (1 RED, 2 BLUE, 3 CLEAR, 1 SNARE) 5.5"

## (undated) DEVICE — KIT DRSG CVC CHG 40/CA

## (undated) DEVICE — BIPOLAR FORCEP SYMMETRY BAYONET 7" X 1.5MM SMOOTH (SILVER)